# Patient Record
Sex: FEMALE | Race: WHITE | NOT HISPANIC OR LATINO | Employment: OTHER | URBAN - METROPOLITAN AREA
[De-identification: names, ages, dates, MRNs, and addresses within clinical notes are randomized per-mention and may not be internally consistent; named-entity substitution may affect disease eponyms.]

---

## 2017-01-03 DIAGNOSIS — C90.01 MULTIPLE MYELOMA IN REMISSION (H): ICD-10-CM

## 2017-01-03 LAB
BASOPHILS # BLD AUTO: 0 10E9/L (ref 0–0.2)
BASOPHILS NFR BLD AUTO: 0.7 %
CREAT SERPL-MCNC: 0.67 MG/DL (ref 0.52–1.04)
DIFFERENTIAL METHOD BLD: ABNORMAL
EOSINOPHIL # BLD AUTO: 0.1 10E9/L (ref 0–0.7)
EOSINOPHIL NFR BLD AUTO: 3.2 %
ERYTHROCYTE [DISTWIDTH] IN BLOOD BY AUTOMATED COUNT: 15.7 % (ref 10–15)
GFR SERPL CREATININE-BSD FRML MDRD: 84 ML/MIN/1.7M2
HCT VFR BLD AUTO: 37.3 % (ref 35–47)
HGB BLD-MCNC: 11.9 G/DL (ref 11.7–15.7)
LYMPHOCYTES # BLD AUTO: 1 10E9/L (ref 0.8–5.3)
LYMPHOCYTES NFR BLD AUTO: 34.2 %
MCH RBC QN AUTO: 32.4 PG (ref 26.5–33)
MCHC RBC AUTO-ENTMCNC: 31.9 G/DL (ref 31.5–36.5)
MCV RBC AUTO: 102 FL (ref 78–100)
MONOCYTES # BLD AUTO: 0.2 10E9/L (ref 0–1.3)
MONOCYTES NFR BLD AUTO: 6.3 %
NEUTROPHILS # BLD AUTO: 1.6 10E9/L (ref 1.6–8.3)
NEUTROPHILS NFR BLD AUTO: 55.6 %
PLATELET # BLD AUTO: 135 10E9/L (ref 150–450)
RBC # BLD AUTO: 3.67 10E12/L (ref 3.8–5.2)
WBC # BLD AUTO: 2.8 10E9/L (ref 4–11)

## 2017-01-03 PROCEDURE — 82565 ASSAY OF CREATININE: CPT | Performed by: INTERNAL MEDICINE

## 2017-01-03 PROCEDURE — 36415 COLL VENOUS BLD VENIPUNCTURE: CPT | Performed by: INTERNAL MEDICINE

## 2017-01-03 PROCEDURE — 85025 COMPLETE CBC W/AUTO DIFF WBC: CPT | Performed by: INTERNAL MEDICINE

## 2017-01-05 ENCOUNTER — TELEPHONE (OUTPATIENT)
Dept: FAMILY MEDICINE | Facility: CLINIC | Age: 82
End: 2017-01-05

## 2017-01-05 NOTE — TELEPHONE ENCOUNTER
Reason for Call:  Request for results:    Name of test or procedure: blood test    Date of test of procedure: 01/3/17    Location of the test or procedure: [facility]    OK to leave the result message on voice mail or with a family member? YES    Phone number Patient can be reached at:  Home number on file [phone] (home)    Additional comments: please fax these results to [facility]    [phone]    Call taken on 1/5/2017 at 1:05 PM by Joan Goodrich

## 2017-01-12 ENCOUNTER — ANTICOAGULATION THERAPY VISIT (OUTPATIENT)
Dept: NURSING | Facility: CLINIC | Age: 82
End: 2017-01-12
Payer: COMMERCIAL

## 2017-01-12 DIAGNOSIS — Z79.01 LONG-TERM (CURRENT) USE OF ANTICOAGULANTS: Primary | ICD-10-CM

## 2017-01-12 LAB — INR POINT OF CARE: 2 (ref 0.86–1.14)

## 2017-01-12 PROCEDURE — 99207 ZZC NO CHARGE NURSE ONLY: CPT

## 2017-01-12 PROCEDURE — 85610 PROTHROMBIN TIME: CPT | Mod: QW

## 2017-01-12 PROCEDURE — 36416 COLLJ CAPILLARY BLOOD SPEC: CPT

## 2017-01-12 NOTE — MR AVS SNAPSHOT
Amira Arreola   1/12/2017 9:45 AM   Anticoagulation Therapy Visit    Description:  84 year old female   Provider:   ANTICOAGULATION CLINIC   Department:  Ec Nurse           INR as of 1/12/2017     Selected INR 2.0 (1/12/2017)      Anticoagulation Summary as of 1/12/2017     INR goal 2.0-3.0   Selected INR 2.0 (1/12/2017)   Full instructions 1/18: 6 mg; 1/25: 6 mg; Otherwise 6 mg on Mon, Fri; 4 mg all other days   Next INR check 1/26/2017    Indications   Long-term (current) use of anticoagulants [Z79.01] [Z79.01]  Atrial fibrillation (H) [I48.91] (Resolved) [I48.91]         Your next Anticoagulation Clinic appointment(s)     Jan 26, 2017 10:30 AM   Anticoagulation Visit with  ANTICOAGULATION CLINIC   [facility] ([facility])    [address] [place] [id number] [phone]              Contact Numbers     Clinic Number:         January 2017 Details    Sun Mon Tue Wed Thu Fri Sat     1               2               3               4               5               6               7                 8               9               10               11               12      4 mg   See details      13      6 mg         14      4 mg           15      4 mg         16      6 mg         17      4 mg         18      6 mg         19      4 mg         20      6 mg         21      4 mg           22      4 mg         23      6 mg         24      4 mg         25      6 mg         26            27               28                 29               30               31                    Date Details   01/12 This INR check       Date of next INR:  1/26/2017         How to take your warfarin dose     To take:  4 mg Take 1 of the 4 mg tablets.    To take:  6 mg Take 1.5 of the 4 mg tablets.

## 2017-01-12 NOTE — PROGRESS NOTES
ANTICOAGULATION FOLLOW-UP CLINIC VISIT    Patient Name:  Amira Arreola  Date:  1/12/2017  Contact Type:  Face to Face    SUBJECTIVE:     Patient Findings     Positives No Problem Findings           OBJECTIVE    INR PROTIME   Date Value Ref Range Status   01/12/2017 2.0* 0.86 - 1.14 Final       ASSESSMENT / PLAN  INR assessment THER    Recheck INR In: 2 WEEKS    INR Location Clinic      Anticoagulation Summary as of 1/12/2017     INR goal 2.0-3.0   Selected INR 2.0 (1/12/2017)   Maintenance plan 6 mg (4 mg x 1.5) on Mon, Fri; 4 mg (4 mg x 1) all other days   Full instructions 1/18: 6 mg; 1/25: 6 mg; Otherwise 6 mg on Mon, Fri; 4 mg all other days   Weekly total 32 mg   Plan last modified Eufemia Boateng RN (12/2/2016)   Next INR check 1/26/2017   Target end date Indefinite    Indications   Long-term (current) use of anticoagulants [Z79.01] [Z79.01]  Atrial fibrillation (H) [I48.91] (Resolved) [I48.91]         Anticoagulation Episode Summary     INR check location     Preferred lab     Send INR reminders to [facility]    Comments PATIENT TAKES WARFARIN IN THE MORNING      Anticoagulation Care Providers     Provider Role Specialty Phone number    Addy Frias MD [facility] Internal Medicine [phone]            See the Encounter Report to view Anticoagulation Flowsheet and Dosing Calendar (Go to Encounters tab in chart review, and find the Anticoagulation Therapy Visit)    Take 6 mg MWF, 4 mg all other days, per patient request to increase from 32 to 34 mg/week, recheck two weeks.     Dosage adjustment made based on physician directed care plan.    Alissa Noble RN

## 2017-01-26 ENCOUNTER — OFFICE VISIT (OUTPATIENT)
Dept: FAMILY MEDICINE | Facility: CLINIC | Age: 82
End: 2017-01-26
Payer: COMMERCIAL

## 2017-01-26 ENCOUNTER — ANTICOAGULATION THERAPY VISIT (OUTPATIENT)
Dept: NURSING | Facility: CLINIC | Age: 82
End: 2017-01-26
Payer: COMMERCIAL

## 2017-01-26 VITALS
WEIGHT: 130 LBS | HEIGHT: 66 IN | SYSTOLIC BLOOD PRESSURE: 120 MMHG | DIASTOLIC BLOOD PRESSURE: 64 MMHG | HEART RATE: 63 BPM | BODY MASS INDEX: 20.89 KG/M2 | TEMPERATURE: 97.8 F | OXYGEN SATURATION: 96 %

## 2017-01-26 DIAGNOSIS — H61.23 BILATERAL IMPACTED CERUMEN: Primary | ICD-10-CM

## 2017-01-26 DIAGNOSIS — Z79.01 LONG-TERM (CURRENT) USE OF ANTICOAGULANTS: Primary | ICD-10-CM

## 2017-01-26 LAB — INR POINT OF CARE: 2 (ref 0.86–1.14)

## 2017-01-26 PROCEDURE — 69210 REMOVE IMPACTED EAR WAX UNI: CPT | Mod: 50 | Performed by: FAMILY MEDICINE

## 2017-01-26 PROCEDURE — 99207 ZZC NO CHARGE NURSE ONLY: CPT

## 2017-01-26 PROCEDURE — 36416 COLLJ CAPILLARY BLOOD SPEC: CPT

## 2017-01-26 PROCEDURE — 85610 PROTHROMBIN TIME: CPT | Mod: QW

## 2017-01-26 NOTE — PROGRESS NOTES
SUBJECTIVE:                                                    Amira Arreola is a 84 year old female who presents to clinic today for the following health issues:      Concern - plugged ears     Onset: x years     Description:   Needs ear flushed per pt    Intensity: Progression of Symptoms:      Accompanying Signs & Symptoms:         Previous history of similar problem:       Precipitating factors:   Worsened by:     Alleviating factors:  Improved by:        Therapies Tried and outcome:       Problem list and histories reviewed & adjusted, as indicated.  Additional history: as documented    Patient Active Problem List   Diagnosis     Advanced directives, counseling/discussion     Benign essential hypertension     Colonic polyp     CARDIOVASCULAR SCREENING; LDL GOAL LESS THAN 160     Hyperlipidemia LDL goal <160     Sciatica of left side     Osteoporosis     OAB (overactive bladder)     Elevated MCV     Thrombocytopenia (H)     MGUS (monoclonal gammopathy of unknown significance)     Ascending aorta dilatation (H)     SVT (supraventricular tachycardia) (H)     Paroxysmal atrial fibrillation (H)     Long-term (current) use of anticoagulants [Z79.01]     Cancer, metastatic to bone (H)     Multiple myeloma not having achieved remission (H)     Past Surgical History   Procedure Laterality Date     Hysteroscopy and d and c       due to bleeding     Right ankle surgery       Cataract iol, rt/lt       Left anle replacement       Colonoscopy  2013     Procedure: COLONOSCOPY;  COLONOSCOPY;  Surgeon: Steffany Rockwell MD;  Location:  GI      section  ,      Excise exostosis tibia / fibula  2014     Procedure: EXCISE EXOSTOSIS TIBIA / FIBULA;  Surgeon: Naila Pichardo MD;  Location: [facility]     Bone marrow biopsy, bone specimen, needle/trocar N/A 2016     Procedure: BIOPSY BONE MARROW;  Surgeon: Bryan Patel MD;  Location:  GI       Social History    Substance Use Topics     Smoking status: Never Smoker      Smokeless tobacco: Never Used     Alcohol Use: No     Family History   Problem Relation Age of Onset     HEART DISEASE Father      Family History Negative Sister      Family History Negative Sister      Family History Negative Brother      C.A.D. Mother          Current Outpatient Prescriptions   Medication Sig Dispense Refill     warfarin (COUMADIN) 4 MG tablet Take 1 1/2 tabs (6 mg) on Mon and Friday, 1 tab (4 mg) rest of week or as directed by the ACC. 84 tablet 0     Zoledronic Acid (ZOMETA IV) Inject into the vein every 30 days       metoprolol (TOPROL-XL) 50 MG 24 hr tablet Take 1 tablet (50 mg) by mouth 2 times daily 180 tablet 3     LENalidomide (REVLIMID) 15 MG CAPS capsule CHEMOTHERAPY Take 1 capsule (15 mg) by mouth daily 30 capsule 0     Desloratadine (CLARINEX PO)        DEXAMETHASONE PO Take 4 mg by mouth 5 tabs daily on Thursday       OXYCODONE HCL PO Take by mouth as needed       Prochlorperazine Maleate (COMPAZINE PO) Take 10 mg by mouth       acetaminophen-codeine (TYLENOL W/CODEINE NO. 3) 300-30 MG per tablet Take 1 tablet by mouth every 8 hours as needed for mild pain Take 1/2 tablet with advil up to every 8 hours as necessary 60 tablet 0     LORazepam (ATIVAN) 0.5 MG tablet 1 hour prior to MRI take 1 tablet (0.5 mg) and may repeat x's 1. 10 tablet 0     ASPIRIN NOT PRESCRIBED (INTENTIONAL) Antiplatelet medication not prescribed intentionally due to Current anticoagulant therapy (warfarin/enoxaparin) 0 each 0     polyethylene glycol (MIRALAX/GLYCOLAX) powder Take 1 capful by mouth daily       UNABLE TO FIND MEDICATION NAME: Fresh Coat eye drops       timolol (TIMOPTIC) 0.25 % ophthalmic solution 1 drop every morning       carboxymethylcellulose (REFRESH PLUS) 0.5 % SOLN 1 drop 4 times daily       Cholecalciferol (VITAMIN D3 PO) Take 1,000 Units by mouth daily       Calcium Citrate-Vitamin D (CALCIUM CITRATE + PO) Take 2,000 mg by mouth  2 tabs       erythromycin (ROMYCIN) ophthalmic ointment Place 1 Application into both eyes At Bedtime        cycloSPORINE (RESTASIS) 0.05 % ophthalmic emulsion Place 1 drop into both eyes every 12 hours        Docusate Sodium (GENTLE STOOL SOFTENER PO) Take 100 mg by mouth daily        Multiple Vitamin (DAILY MULTIVITAMIN PO) Take 1 tablet by mouth daily.       Allergies   Allergen Reactions     Penicillin [Penicillins] Rash     Blotches on chest      Recent Labs   Lab Test  01/03/17   0959  11/09/16   0920  10/12/16   0839   05/11/16   0859  04/19/16   1431  10/22/15   1640  09/21/15   0911  09/16/14   0910   LDL   --    --   71   --    --    --    --   99  96   HDL   --    --   76   --    --    --    --   86  74   TRIG   --    --   66   --    --    --    --   66  62   ALT   --    --   27   --    --    --    --   20  18   CR  0.67  0.67  0.65   < >  0.59  0.59   --   0.57  0.63   GFRESTIMATED  84  84  86   < >  >90  Non  GFR Calc    >90  Non  GFR Calc     --   >90  Non  GFR Calc    >90  Non  GFR Calc     GFRESTBLACK  >90   GFR Calc    >90   GFR Calc    >90   GFR Calc     < >  >90   GFR Calc    >90   GFR Calc     --   >90   GFR Calc    >90   GFR Calc     POTASSIUM   --    --   4.4   --   4.3  4.2   --   4.0  4.1   TSH   --    --    --    --    --   1.68  1.84   --    --     < > = values in this interval not displayed.      BP Readings from Last 3 Encounters:   01/26/17 120/64   12/22/16 136/76   09/29/16 133/72    Wt Readings from Last 3 Encounters:   01/26/17 130 lb (58.968 kg)   12/22/16 128 lb (58.06 kg)   09/29/16 131 lb (59.421 kg)           Amira Arreola is a 84 year old female who presents with bilateral ear fullness for 2 week(s).   Severity: moderate   Timing:gradual onset  Additional symptoms include none.      History of  recurrent otitis: not applicable    Past Medical History   Diagnosis Date     Osteoporosis      fu done 2010 and stable, went off meds then, fu done 2013; has had gyn fu and added evista 2013 by gyn     HTN (hypertension) 2000     off meds for years     Menorrhagia 2002     hysteroscopy and d and c done     Shingles 2004     Colonic polyp 2008     adenomatous, fu 2013 tics only     Sciatica of left side 12/13     Dr. Cervantes     OAB (overactive bladder) 2013     Dr. Grullon     Elevated MCV 2015     b12 and folic acid nl     Thrombocytopenia (H) 2014     MGUS (monoclonal gammopathy of unknown significance) 2015     eval by Dr. Roberts     Palpitations 4/16     nl echo, mildly dilated asc aorta     Ascending aorta dilatation (H) 4/16     on echo, mild     Dry eyes      Paroxysmal atrial fibrillation (H) 4/16     had palp and ziopatch showed it, echo nl lv fxn, mild mr and tr, added coum and toprol, toprol dose raised 12/22/16     SVT (supraventricular tachycardia) (H) 4/16     on ziopatch     Multiple myeloma (H) 2016     dx 5/16 at [place], bone lesions seen on mri 6/16     Cancer, metastatic to bone (H)      due to myeloma     Compression fracture 2016     multiple areas of spine     Current Outpatient Prescriptions   Medication Sig Dispense Refill     warfarin (COUMADIN) 4 MG tablet Take 1 1/2 tabs (6 mg) on Mon and Friday, 1 tab (4 mg) rest of week or as directed by the ACC. 84 tablet 0     Zoledronic Acid (ZOMETA IV) Inject into the vein every 30 days       metoprolol (TOPROL-XL) 50 MG 24 hr tablet Take 1 tablet (50 mg) by mouth 2 times daily 180 tablet 3     LENalidomide (REVLIMID) 15 MG CAPS capsule CHEMOTHERAPY Take 1 capsule (15 mg) by mouth daily 30 capsule 0     Desloratadine (CLARINEX PO)        DEXAMETHASONE PO Take 4 mg by mouth 5 tabs daily on Thursday       OXYCODONE HCL PO Take by mouth as needed       Prochlorperazine Maleate (COMPAZINE PO) Take 10 mg by mouth       acetaminophen-codeine (TYLENOL  "W/CODEINE NO. 3) 300-30 MG per tablet Take 1 tablet by mouth every 8 hours as needed for mild pain Take 1/2 tablet with advil up to every 8 hours as necessary 60 tablet 0     LORazepam (ATIVAN) 0.5 MG tablet 1 hour prior to MRI take 1 tablet (0.5 mg) and may repeat x's 1. 10 tablet 0     ASPIRIN NOT PRESCRIBED (INTENTIONAL) Antiplatelet medication not prescribed intentionally due to Current anticoagulant therapy (warfarin/enoxaparin) 0 each 0     polyethylene glycol (MIRALAX/GLYCOLAX) powder Take 1 capful by mouth daily       UNABLE TO FIND MEDICATION NAME: Fresh Coat eye drops       timolol (TIMOPTIC) 0.25 % ophthalmic solution 1 drop every morning       carboxymethylcellulose (REFRESH PLUS) 0.5 % SOLN 1 drop 4 times daily       Cholecalciferol (VITAMIN D3 PO) Take 1,000 Units by mouth daily       Calcium Citrate-Vitamin D (CALCIUM CITRATE + PO) Take 2,000 mg by mouth 2 tabs       erythromycin (ROMYCIN) ophthalmic ointment Place 1 Application into both eyes At Bedtime        cycloSPORINE (RESTASIS) 0.05 % ophthalmic emulsion Place 1 drop into both eyes every 12 hours        Docusate Sodium (GENTLE STOOL SOFTENER PO) Take 100 mg by mouth daily        Multiple Vitamin (DAILY MULTIVITAMIN PO) Take 1 tablet by mouth daily.       Social History   Substance Use Topics     Smoking status: Never Smoker      Smokeless tobacco: Never Used     Alcohol Use: No       ROS:   Review of systems negative except as stated above.    OBJECTIVE:  /64 mmHg  Pulse 63  Temp(Src) 97.8  F (36.6  C) (Tympanic)  Ht 5' 6\" (1.676 m)  Wt 130 lb (58.968 kg)  BMI 20.99 kg/m2  SpO2 96%   EXAM:  The right TM is not visualized secondary to cerumen     The right auditory canal is obstructed with cerumen  The left TM is not visualized secondary to cerumen  The left auditory canal is obstructed with cerumen  Oropharynx exam is normal: no lesions, erythema, adenopathy or exudate.  GENERAL: no acute distress  EYES: EOMI,  PERRL, conjunctiva " clear  NECK: supple, non-tender to palpation, no adenopathy noted  RESP: lungs clear to auscultation - no rales, rhonchi or wheezes  CV: regular rates and rhythm, normal S1 S2, no murmur noted  SKIN: no suspicious lesions or rashes     ASSESSMENT:  Amira was seen today for ear problem.    Diagnoses and all orders for this visit:    Bilateral impacted cerumen  -     REMOVAL OF IMPACTED WAX MD      Wax was removed from both ear external canal with ENT foceps and flushed with water without complication

## 2017-01-26 NOTE — MR AVS SNAPSHOT
Amiragino Arreola   1/26/2017 10:30 AM   Anticoagulation Therapy Visit    Description:  84 year old female   Provider:  [facility] ANTICOAGULATION [facility]IC   Department:  Ec Nurse           INR as of 1/26/2017     Selected INR 2.0 (1/26/2017)      Anticoagulation Summary as of 1/26/2017     INR goal 2.0-3.0   Selected INR 2.0 (1/26/2017)   Full instructions 1/29: 6 mg; 2/1: 6 mg; 2/5: 6 mg; 2/8: 6 mg; Otherwise 6 mg on Mon, Fri; 4 mg all other days   Next INR check 2/9/2017    Indications   Long-term (current) use of anticoagulants [Z79.01] [Z79.01]  Atrial fibrillation (H) [I48.91] (Resolved) [I48.91]         Your next Anticoagulation Clinic appointment(s)     Feb 09, 2017 10:15 AM   Anticoagulation Visit with  ANTICOAGULATION CLINIC   [facility] ([address] [id number] [phone]              Contact Numbers     Clinic Number:         January 2017 Details    Sun Mon Tue Wed Thu Fri Sat     1               2               3               4               5               6               7                 8               9               10               11               12               13               14                 15               16               17               18               19               20               21                 22               23               24               25               26      4 mg   See details      27      6 mg         28      4 mg           29      6 mg         30      6 mg         31      4 mg              Date Details   01/26 This INR check               How to take your warfarin dose     To take:  4 mg Take 1 of the 4 mg tablets.    To take:  6 mg Take 1.5 of the 4 mg tablets.           February 2017 Details    Sun Mon Tue Wed Thu Fri Sat        1      6 mg         2      4 mg         3      6 mg         4      4 mg           5      6 mg         6      6 mg         7      4 mg         8       6 mg         9            10               11                 12               13               14               15               16               17               18                 19               20               21               22               23               24               25                 26               27               28                    Date Details   No additional details    Date of next INR:  2/9/2017         How to take your warfarin dose     To take:  4 mg Take 1 of the 4 mg tablets.    To take:  6 mg Take 1.5 of the 4 mg tablets.

## 2017-01-26 NOTE — NURSING NOTE
"Chief Complaint   Patient presents with     Ear Problem       Initial /64 mmHg  Pulse 63  Temp(Src) 97.8  F (36.6  C) (Tympanic)  Ht 5' 6\" (1.676 m)  Wt 130 lb (58.968 kg)  BMI 20.99 kg/m2  SpO2 96% Estimated body mass index is 20.99 kg/(m^2) as calculated from the following:    Height as of this encounter: 5' 6\" (1.676 m).    Weight as of this encounter: 130 lb (58.968 kg).  BP completed using cuff size: liliana Jain CMA    "

## 2017-01-26 NOTE — MR AVS SNAPSHOT
After Visit Summary   1/26/2017    Amira Arreola    MRN: [id number]           Patient Information     Date Of Birth          7/17/1932        Visit Information        Provider Department      1/26/2017 11:00 AM David Duarte MD [facility] [place]        Today's Diagnoses     Bilateral impacted cerumen    -  1        Follow-ups after your visit        Your next 10 appointments already scheduled     Feb 08, 2017 10:15 AM   LAB with CS LAB   [facility] ([facility])    [address] [facility] [address] [phone]           Patient must bring picture ID.  Patient should be prepared to give a urine specimen  Please do not eat 10-12 hours before your appointment if you are coming in fasting for labs on lipids, cholesterol, or glucose (sugar).  Pregnant women should follow their Care Team instructions. Water with medications is okay. Do not drink coffee or other fluids.   If you have concerns about taking  your medications, please ask at office or if scheduling via [employer], send a message by clicking on Secure Messaging, Message Your Care Team.            Feb 09, 2017 10:15 AM   Anticoagulation Visit with [facility] ANTICOAGULATION CLINIC   [facility] [place] ([facility] [place])    [address] [place] [id number] [phone]              Who to contact     If you have questions or need follow up information about today's clinic visit or your schedule please contact [facility] [place] directly at [phone].  Normal or non-critical lab and imaging results will be communicated to you by MyChart, letter or phone within 4 business days after the clinic has received the results. If you do not hear from us within 7 days, please contact the clinic through [employer] or phone. If you have a critical or abnormal lab result, we will notify you by phone as soon as possible.  Submit refill requests through [employer]  "or call your pharmacy and they will forward the refill request to us. Please allow 3 business days for your refill to be completed.          Additional Information About Your Visit        Care EveryWhere ID     This is your Care EveryWhere ID. This could be used by other organizations to access your [facility] medical records  [id number]        Your Vitals Were     Pulse Temperature Height BMI (Body Mass Index) Pulse Oximetry       63 97.8  F (36.6  C) (Tympanic) 5' 6\" (1.676 m) 20.99 kg/m2 96%        Blood Pressure from Last 3 Encounters:   01/26/17 120/64   12/22/16 136/76   09/29/16 133/72    Weight from Last 3 Encounters:   01/26/17 130 lb (58.968 kg)   12/22/16 128 lb (58.06 kg)   09/29/16 131 lb (59.421 kg)              We Performed the Following     REMOVAL OF IMPACTED WAX MD        Primary Care Provider Office Phone # Fax #    Addy Sean Frias [phone]       [facility] [address] [facility] [address]  [facility] [address]        Thank you!     Thank you for choosing [facility]  for your care. Our goal is always to provide you with excellent care. Hearing back from our patients is one way we can continue to improve our services. Please take a few minutes to complete the written survey that you may receive in the mail after your visit with us. Thank you!             Your Updated Medication List - Protect others around you: Learn how to safely use, store and throw away your medicines at www.disposemymeds.org.          This list is accurate as of: 1/26/17 11:07 AM.  Always use your most recent med list.                   Brand Name Dispense Instructions for use    acetaminophen-codeine 300-30 MG per tablet    TYLENOL w/CODEINE No. 3    60 tablet    Take 1 tablet by mouth every 8 hours as needed for mild pain Take 1/2 tablet with advil up to every 8 hours as necessary       ASPIRIN NOT PRESCRIBED    INTENTIONAL    0 each    Antiplatelet medication not prescribed " intentionally due to Current anticoagulant therapy (warfarin/enoxaparin)       ATIVAN 0.5 MG tablet   Generic drug:  LORazepam     10 tablet    1 hour prior to MRI take 1 tablet (0.5 mg) and may repeat x's 1.       CALCIUM CITRATE + PO      Take 2,000 mg by mouth 2 tabs       carboxymethylcellulose 0.5 % Soln ophthalmic solution    REFRESH PLUS     1 drop 4 times daily       CLARINEX PO          COMPAZINE PO      Take 10 mg by mouth       cycloSPORINE 0.05 % ophthalmic emulsion    RESTASIS     Place 1 drop into both eyes every 12 hours       DAILY MULTIVITAMIN PO      Take 1 tablet by mouth daily.       DEXAMETHASONE PO      Take 4 mg by mouth 5 tabs daily on Thursday       erythromycin ophthalmic ointment    ROMYCIN     Place 1 Application into both eyes At Bedtime       GENTLE STOOL SOFTENER PO      Take 100 mg by mouth daily       LENalidomide 15 MG Caps capsule CHEMOTHERAPY    REVLIMID    30 capsule    Take 1 capsule (15 mg) by mouth daily       metoprolol 50 MG 24 hr tablet    TOPROL-XL    180 tablet    Take 1 tablet (50 mg) by mouth 2 times daily       OXYCODONE HCL PO      Take by mouth as needed       polyethylene glycol powder    MIRALAX/GLYCOLAX     Take 1 capful by mouth daily       timolol 0.25 % ophthalmic solution    TIMOPTIC     1 drop every morning       UNABLE TO FIND      MEDICATION NAME: Fresh Coat eye drops       VITAMIN D3 PO      Take 1,000 Units by mouth daily       warfarin 4 MG tablet    COUMADIN    84 tablet    Take 1 1/2 tabs (6 mg) on Mon and Friday, 1 tab (4 mg) rest of week or as directed by the ACC.       ZOMETA IV      Inject into the vein every 30 days

## 2017-01-26 NOTE — PROGRESS NOTES
ANTICOAGULATION FOLLOW-UP CLINIC VISIT    Patient Name:  Amira Arreola  Date:  1/26/2017  Contact Type:  Face to Face    SUBJECTIVE:     Patient Findings     Positives No Problem Findings           OBJECTIVE    INR PROTIME   Date Value Ref Range Status   01/26/2017 2.0* 0.86 - 1.14 Final       ASSESSMENT / PLAN  INR assessment THER    Recheck INR In: 2 WEEKS    INR Location Clinic      Anticoagulation Summary as of 1/26/2017     INR goal 2.0-3.0   Selected INR 2.0 (1/26/2017)   Maintenance plan 6 mg (4 mg x 1.5) on Mon, Fri; 4 mg (4 mg x 1) all other days   Full instructions 1/29: 6 mg; 2/1: 6 mg; 2/5: 6 mg; 2/8: 6 mg; Otherwise 6 mg on Mon, Fri; 4 mg all other days   Weekly total 32 mg   Plan last modified Eufemia Boateng RN (12/2/2016)   Next INR check 2/9/2017   Target end date Indefinite    Indications   Long-term (current) use of anticoagulants [Z79.01] [Z79.01]  Atrial fibrillation (H) [I48.91] (Resolved) [I48.91]         Anticoagulation Episode Summary     INR check location     Preferred lab     Send INR reminders to [facility]    Comments PATIENT TAKES WARFARIN IN THE MORNING      Anticoagulation Care Providers     Provider Role Specialty Phone number    Addy Frias MD [facility] Internal Medicine [phone]            See the Encounter Report to view Anticoagulation Flowsheet and Dosing Calendar (Go to Encounters tab in chart review, and find the Anticoagulation Therapy Visit)    Take 6 mg SMWF, 4 mg all other days, patient request to increase dose by 2 mg to increase INR reading.  Recheck 2 weeks.     Dosage adjustment made based on physician directed care plan.    Alissa Noble, SHELLIE

## 2017-02-08 DIAGNOSIS — C90.01 MULTIPLE MYELOMA IN REMISSION (H): Primary | ICD-10-CM

## 2017-02-08 LAB
BASOPHILS # BLD AUTO: 0 10E9/L (ref 0–0.2)
BASOPHILS NFR BLD AUTO: 1.3 %
DIFFERENTIAL METHOD BLD: ABNORMAL
EOSINOPHIL # BLD AUTO: 0.1 10E9/L (ref 0–0.7)
EOSINOPHIL NFR BLD AUTO: 6.3 %
ERYTHROCYTE [DISTWIDTH] IN BLOOD BY AUTOMATED COUNT: 15.2 % (ref 10–15)
HCT VFR BLD AUTO: 35.8 % (ref 35–47)
HGB BLD-MCNC: 11.5 G/DL (ref 11.7–15.7)
LYMPHOCYTES # BLD AUTO: 0.6 10E9/L (ref 0.8–5.3)
LYMPHOCYTES NFR BLD AUTO: 38.1 %
MCH RBC QN AUTO: 33 PG (ref 26.5–33)
MCHC RBC AUTO-ENTMCNC: 32.1 G/DL (ref 31.5–36.5)
MCV RBC AUTO: 103 FL (ref 78–100)
MONOCYTES # BLD AUTO: 0.1 10E9/L (ref 0–1.3)
MONOCYTES NFR BLD AUTO: 8.1 %
NEUTROPHILS # BLD AUTO: 0.7 10E9/L (ref 1.6–8.3)
NEUTROPHILS NFR BLD AUTO: 46.2 %
PLATELET # BLD AUTO: 147 10E9/L (ref 150–450)
RBC # BLD AUTO: 3.48 10E12/L (ref 3.8–5.2)
WBC # BLD AUTO: 1.6 10E9/L (ref 4–11)

## 2017-02-08 PROCEDURE — 82565 ASSAY OF CREATININE: CPT | Performed by: INTERNAL MEDICINE

## 2017-02-08 PROCEDURE — 36415 COLL VENOUS BLD VENIPUNCTURE: CPT | Performed by: INTERNAL MEDICINE

## 2017-02-08 PROCEDURE — 85025 COMPLETE CBC W/AUTO DIFF WBC: CPT | Performed by: INTERNAL MEDICINE

## 2017-02-09 ENCOUNTER — ANTICOAGULATION THERAPY VISIT (OUTPATIENT)
Dept: NURSING | Facility: CLINIC | Age: 82
End: 2017-02-09
Payer: COMMERCIAL

## 2017-02-09 DIAGNOSIS — Z79.01 LONG-TERM (CURRENT) USE OF ANTICOAGULANTS: Primary | ICD-10-CM

## 2017-02-09 LAB
CREAT SERPL-MCNC: 0.7 MG/DL (ref 0.52–1.04)
GFR SERPL CREATININE-BSD FRML MDRD: 79 ML/MIN/1.7M2
INR POINT OF CARE: 2 (ref 0.86–1.14)

## 2017-02-09 PROCEDURE — 36416 COLLJ CAPILLARY BLOOD SPEC: CPT

## 2017-02-09 PROCEDURE — 85610 PROTHROMBIN TIME: CPT | Mod: QW

## 2017-02-09 NOTE — MR AVS SNAPSHOT
Amira Arreola   2/9/2017 10:15 AM   Anticoagulation Therapy Visit    Description:  84 year old female   Provider:  [facility]   Department:  Ec Nurse           INR as of 2/9/2017     Selected INR 2.0 (2/9/2017)      Anticoagulation Summary as of 2/9/2017     INR goal 2.0-3.0   Selected INR 2.0 (2/9/2017)   Full instructions 2/9: 6 mg; 2/12: 6 mg; Otherwise 6 mg on Mon, Fri; 4 mg all other days   Next INR check 2/16/2017    Indications   Long-term (current) use of anticoagulants [Z79.01] [Z79.01]  Atrial fibrillation (H) [I48.91] (Resolved) [I48.91]         Your next Anticoagulation Clinic appointment(s)     Feb 16, 2017  9:30 AM   Anticoagulation Visit with  ANTICOAGULATION CLINIC   [facility] [place]    [address] [place] [id number] [phone]              Contact Numbers     Clinic Number:         February 2017 Details    Sun Mon Tue Wed Thu Fri Sat        1               2               3               4                 5               6               7               8               9      6 mg   See details      10      6 mg         11      4 mg           12      6 mg         13      6 mg         14      4 mg         15      4 mg         16            17               18                 19               20               21               22               23               24               25                 26               27               28                    Date Details   02/09 This INR check       Date of next INR:  2/16/2017         How to take your warfarin dose     To take:  4 mg Take 1 of the 4 mg tablets.    To take:  6 mg Take 1.5 of the 4 mg tablets.

## 2017-02-09 NOTE — PROGRESS NOTES
ANTICOAGULATION FOLLOW-UP CLINIC VISIT    Patient Name:  Amira Arreola  Date:  2/9/2017  Contact Type:  Face to Face    SUBJECTIVE:     Patient Findings     Positives No Problem Findings           OBJECTIVE    INR PROTIME   Date Value Ref Range Status   02/09/2017 2.0* 0.86 - 1.14 Final       ASSESSMENT / PLAN  INR assessment THER    Recheck INR In: 1 WEEK    INR Location Clinic      Anticoagulation Summary as of 2/9/2017     INR goal 2.0-3.0   Selected INR 2.0 (2/9/2017)   Maintenance plan 6 mg (4 mg x 1.5) on Mon, Fri; 4 mg (4 mg x 1) all other days   Full instructions 2/9: 6 mg; 2/12: 6 mg; 2/15: 6 mg; Otherwise 6 mg on Mon, Fri; 4 mg all other days   Weekly total 32 mg   Plan last modified Eufemia Boateng RN (12/2/2016)   Next INR check 2/16/2017   Target end date Indefinite    Indications   Long-term (current) use of anticoagulants [Z79.01] [Z79.01]  Atrial fibrillation (H) [I48.91] (Resolved) [I48.91]         Anticoagulation Episode Summary     INR check location     Preferred lab     Send INR reminders to  [facility]    Comments PATIENT TAKES WARFARIN IN THE MORNING      Anticoagulation Care Providers     Provider Role Specialty Phone number    Addy Frias MD [facility] Internal Medicine [phone]            See the Encounter Report to view Anticoagulation Flowsheet and Dosing Calendar (Go to Encounters tab in chart review, and find the Anticoagulation Therapy Visit)    Take 4 mg TS, 6 mg all other days. Recheck one week, patient request.    Dosage adjustment made based on physician directed care plan.    Alissa Noble RN

## 2017-02-16 ENCOUNTER — ANTICOAGULATION THERAPY VISIT (OUTPATIENT)
Dept: NURSING | Facility: CLINIC | Age: 82
End: 2017-02-16
Payer: COMMERCIAL

## 2017-02-16 DIAGNOSIS — Z79.01 LONG-TERM (CURRENT) USE OF ANTICOAGULANTS: Primary | ICD-10-CM

## 2017-02-16 DIAGNOSIS — I48.0 PAROXYSMAL ATRIAL FIBRILLATION (H): ICD-10-CM

## 2017-02-16 DIAGNOSIS — Z79.01 LONG-TERM (CURRENT) USE OF ANTICOAGULANTS: ICD-10-CM

## 2017-02-16 LAB — INR POINT OF CARE: 2.1 (ref 0.86–1.14)

## 2017-02-16 PROCEDURE — 85610 PROTHROMBIN TIME: CPT | Mod: QW

## 2017-02-16 PROCEDURE — 36416 COLLJ CAPILLARY BLOOD SPEC: CPT

## 2017-02-16 RX ORDER — WARFARIN SODIUM 4 MG/1
TABLET ORAL
Qty: 84 TABLET | Refills: 0 | Status: SHIPPED | OUTPATIENT
Start: 2017-02-16 | End: 2017-02-24

## 2017-02-16 NOTE — TELEPHONE ENCOUNTER
Warfarin    Last Written Prescription Date: 12/29/16  Last Fill Qty: 84, # refills: 0  [place] [facility] [place] [facility] or [facility] prescribing provider: 1/26/17       Date and Result of Last PT/INR:   Lab Results   Component Value Date    INR 2.1 02/16/2017    INR 2.0 02/09/2017      Prescription approved per [facility] Ref[place] Protocol.    SHELLIE Silva

## 2017-02-16 NOTE — PROGRESS NOTES
ANTICOAGULATION FOLLOW-UP CLINIC VISIT    Patient Name:  Amira Arreola  Date:  2/16/2017  Contact Type:  Face to Face    SUBJECTIVE:     Patient Findings     Positives No Problem Findings           OBJECTIVE    INR Protime   Date Value Ref Range Status   02/16/2017 2.1 (A) 0.86 - 1.14 Final       ASSESSMENT / PLAN  INR assessment THER    Recheck INR In: 1 WEEK    INR Location Clinic      Anticoagulation Summary as of 2/16/2017     INR goal 2.0-3.0   Today's INR 2.1   Maintenance plan 6 mg (4 mg x 1.5) on Mon, Fri; 4 mg (4 mg x 1) all other days   Full instructions 2/16: 6 mg; 2/19: 6 mg; 2/21: 6 mg; 2/22: 6 mg; Otherwise 6 mg on Mon, Fri; 4 mg all other days   Weekly total 32 mg   Plan last modified Eufemia Boateng RN (12/2/2016)   Next INR check 2/23/2017   Target end date Indefinite    Indications   Long-term (current) use of anticoagulants [Z79.01] [Z79.01]  Atrial fibrillation (H) [I48.91] (Resolved) [I48.91]         Anticoagulation Episode Summary     INR check location     Preferred lab     Send INR reminders to  [facility]    Comments PATIENT TAKES WARFARIN IN THE MORNING      Anticoagulation Care Providers     Provider Role Specialty Phone number    YulianafarhadAddy MD [facility] Internal Medicine [phone]            See the Encounter Report to view Anticoagulation Flowsheet and Dosing Calendar (Go to Encounters tab in chart review, and find the Anticoagulation Therapy Visit)    Take 4 mg Sat, 6 mg all other days, recheck one week.    Pt taking [employer]aro brand, does not want to switch Pharmacy/brands at this time, goal is to get INR in mid 2's per patient request.     Dosage adjustment made based on physician directed care plan.    Alissa Noble, RN

## 2017-02-16 NOTE — MR AVS SNAPSHOT
Amira Arreola   2/16/2017 9:30 AM   Anticoagulation Therapy Visit    Description:  84 year old female   Provider:  [facility]   Department:  Ec Nurse           INR as of 2/16/2017     Today's INR 2.1      Anticoagulation Summary as of 2/16/2017     INR goal 2.0-3.0   Today's INR 2.1   Full instructions 2/16: 6 mg; 2/19: 6 mg; 2/21: 6 mg; 2/22: 6 mg; Otherwise 6 mg on Mon, Fri; 4 mg all other days   Next INR check 2/23/2017    Indications   Long-term (current) use of anticoagulants [Z79.01] [Z79.01]  Atrial fibrillation (H) [I48.91] (Resolved) [I48.91]         Your next Anticoagulation Clinic appointment(s)     Feb 23, 2017  9:45 AM CST   Anticoagulation Visit with [facility]   [facility] ([facility])    [address] [place] [id number] [phone]              Contact Numbers     Clinic Number:         February 2017 Details    Sun Mon Tue Wed Thu Fri Sat        1               2               3               4                 5               6               7               8               9               10               11                 12               13               14               15               16      6 mg   See details      17      6 mg         18      4 mg           19      6 mg         20      6 mg         21      6 mg         22      6 mg         23            24               25                 26               27               28                    Date Details   02/16 This INR check       Date of next INR:  2/23/2017         How to take your warfarin dose     To take:  4 mg Take 1 of the 4 mg tablets.    To take:  6 mg Take 1.5 of the 4 mg tablets.

## 2017-02-24 ENCOUNTER — ANTICOAGULATION THERAPY VISIT (OUTPATIENT)
Dept: NURSING | Facility: CLINIC | Age: 82
End: 2017-02-24
Payer: COMMERCIAL

## 2017-02-24 DIAGNOSIS — Z79.01 LONG-TERM (CURRENT) USE OF ANTICOAGULANTS: ICD-10-CM

## 2017-02-24 DIAGNOSIS — I48.0 PAROXYSMAL ATRIAL FIBRILLATION (H): ICD-10-CM

## 2017-02-24 LAB — INR POINT OF CARE: 3.3 (ref 0.86–1.14)

## 2017-02-24 PROCEDURE — 85610 PROTHROMBIN TIME: CPT | Mod: QW

## 2017-02-24 PROCEDURE — 99207 ZZC NO CHARGE NURSE ONLY: CPT

## 2017-02-24 PROCEDURE — 36416 COLLJ CAPILLARY BLOOD SPEC: CPT

## 2017-02-24 RX ORDER — WARFARIN SODIUM 4 MG/1
TABLET ORAL
Qty: 90 TABLET | Refills: 0 | Status: SHIPPED | OUTPATIENT
Start: 2017-02-24 | End: 2017-05-04

## 2017-02-24 NOTE — PROGRESS NOTES
ANTICOAGULATION FOLLOW-UP CLINIC VISIT    Patient Name:  Amira Arreola  Date:  2/24/2017  Contact Type:  Face to Face    SUBJECTIVE:     Patient Findings     Positives No Problem Findings           OBJECTIVE    INR Protime   Date Value Ref Range Status   02/24/2017 3.3 (A) 0.86 - 1.14 Final       ASSESSMENT / PLAN  INR assessment SUPRA    Recheck INR In: 1 WEEK    INR Location Clinic      Anticoagulation Summary as of 2/24/2017     INR goal 2.0-3.0   Today's INR 3.3!   Maintenance plan 4 mg (4 mg x 1) on Tue, Sat; 6 mg (4 mg x 1.5) all other days   Full instructions 4 mg on Tue, Sat; 6 mg all other days   Weekly total 38 mg   Plan last modified Dayana Barrera RN (2/24/2017)   Next INR check 3/3/2017   Target end date Indefinite    Indications   Long-term (current) use of anticoagulants [Z79.01] [Z79.01]  Atrial fibrillation (H) [I48.91] (Resolved) [I48.91]         Anticoagulation Episode Summary     INR check location     Preferred lab     Send INR reminders to  [facility]    Comments PATIENT TAKES WARFARIN IN THE MORNING      Anticoagulation Care Providers     Provider Role Specialty Phone number    Addy Frias MD Responsible Internal Medicine [phone]            See the Encounter Report to view Anticoagulation Flowsheet and Dosing Calendar (Go to Encounters tab in chart review, and find the Anticoagulation Therapy Visit)    Dosage adjustment made based on physician directed care plan.    3.3 today.  Advised to take 4 mg on Tues, Sat;  6 mg all other days.  Recheck in 1 week.     Dayana Barrera RN

## 2017-02-24 NOTE — MR AVS SNAPSHOT
Amiragino Arreola   2/24/2017 11:15 AM   Anticoagulation Therapy Visit    Description:  84 year old female   Provider:   ANTICOAGULATION CLINIC   Department:  Ec Nurse           INR as of 2/24/2017     Today's INR 3.3!      Anticoagulation Summary as of 2/24/2017     INR goal 2.0-3.0   Today's INR 3.3!   Full instructions 4 mg on Tue, Sat; 6 mg all other days   Next INR check 3/3/2017    Indications   Long-term (current) use of anticoagulants [Z79.01] [Z79.01]  Atrial fibrillation (H) [I48.91] (Resolved) [I48.91]         Description     3.3 today.  Advised to take 4 mg on Tues, Sat;  6 mg all other days.  Recheck in 2 week.         Your next Anticoagulation Clinic appointment(s)     Mar 03, 2017  9:45 AM CST   Anticoagulation Visit with  ANTICOAGULATION CLINIC   [facility] [place] [facility] [place] [place]    [address] [place] [id number]   [phone]              Contact Numbers     Clinic Number:         February 2017 Details    Sun Mon Tue Wed Thu Fri Sat        1               2               3               4                 5               6               7               8               9               10               11                 12               13               14               15               16               17               18                 19               20               21               22               23               24      6 mg   See details      25      4 mg           26      6 mg         27      6 mg         28      4 mg              Date Details   02/24 This INR check               How to take your warfarin dose     To take:  4 mg Take 1 of the 4 mg tablets.    To take:  6 mg Take 1.5 of the 4 mg tablets.           March 2017 Details    Sun Mon Tue Wed Thu Fri Sat        1      6 mg         2      6 mg         3            4                 5               6               7               8               9               10                11                 12               13               14               15               16               17               18                 19               20               21               22               23               24               25                 26               27               28               29               30               31                 Date Details   No additional details    Date of next INR:  3/3/2017         How to take your warfarin dose     To take:  6 mg Take 1.5 of the 4 mg tablets.

## 2017-03-01 DIAGNOSIS — C90.01 MULTIPLE MYELOMA IN REMISSION (H): ICD-10-CM

## 2017-03-01 LAB
BASOPHILS # BLD AUTO: 0 10E9/L (ref 0–0.2)
BASOPHILS NFR BLD AUTO: 0.5 %
CREAT SERPL-MCNC: 0.73 MG/DL (ref 0.52–1.04)
DIFFERENTIAL METHOD BLD: ABNORMAL
EOSINOPHIL # BLD AUTO: 0.1 10E9/L (ref 0–0.7)
EOSINOPHIL NFR BLD AUTO: 5.6 %
ERYTHROCYTE [DISTWIDTH] IN BLOOD BY AUTOMATED COUNT: 15.2 % (ref 10–15)
GFR SERPL CREATININE-BSD FRML MDRD: 76 ML/MIN/1.7M2
HCT VFR BLD AUTO: 34.6 % (ref 35–47)
HGB BLD-MCNC: 11.2 G/DL (ref 11.7–15.7)
LYMPHOCYTES # BLD AUTO: 0.9 10E9/L (ref 0.8–5.3)
LYMPHOCYTES NFR BLD AUTO: 44.4 %
MCH RBC QN AUTO: 33.1 PG (ref 26.5–33)
MCHC RBC AUTO-ENTMCNC: 32.4 G/DL (ref 31.5–36.5)
MCV RBC AUTO: 102 FL (ref 78–100)
MONOCYTES # BLD AUTO: 0.3 10E9/L (ref 0–1.3)
MONOCYTES NFR BLD AUTO: 14.3 %
NEUTROPHILS # BLD AUTO: 0.7 10E9/L (ref 1.6–8.3)
NEUTROPHILS NFR BLD AUTO: 35.2 %
PLATELET # BLD AUTO: 118 10E9/L (ref 150–450)
RBC # BLD AUTO: 3.38 10E12/L (ref 3.8–5.2)
WBC # BLD AUTO: 2 10E9/L (ref 4–11)

## 2017-03-01 PROCEDURE — 82565 ASSAY OF CREATININE: CPT | Performed by: INTERNAL MEDICINE

## 2017-03-01 PROCEDURE — 85025 COMPLETE CBC W/AUTO DIFF WBC: CPT | Performed by: INTERNAL MEDICINE

## 2017-03-01 PROCEDURE — 36415 COLL VENOUS BLD VENIPUNCTURE: CPT | Performed by: INTERNAL MEDICINE

## 2017-03-03 ENCOUNTER — ANTICOAGULATION THERAPY VISIT (OUTPATIENT)
Dept: NURSING | Facility: CLINIC | Age: 82
End: 2017-03-03
Payer: COMMERCIAL

## 2017-03-03 DIAGNOSIS — Z79.01 LONG-TERM (CURRENT) USE OF ANTICOAGULANTS: ICD-10-CM

## 2017-03-03 LAB — INR POINT OF CARE: 3.5 (ref 0.86–1.14)

## 2017-03-03 PROCEDURE — 85610 PROTHROMBIN TIME: CPT | Mod: QW

## 2017-03-03 PROCEDURE — 36416 COLLJ CAPILLARY BLOOD SPEC: CPT

## 2017-03-03 PROCEDURE — 99207 ZZC NO CHARGE NURSE ONLY: CPT

## 2017-03-03 NOTE — PROGRESS NOTES
ANTICOAGULATION FOLLOW-UP CLINIC VISIT    Patient Name:  Amira Arreola  Date:  3/3/2017  Contact Type:  Face to Face    SUBJECTIVE:     Patient Findings     Positives Change in diet/appetite    Comments Less greens            OBJECTIVE    INR Protime   Date Value Ref Range Status   03/03/2017 3.5 (A) 0.86 - 1.14 Final       ASSESSMENT / PLAN  INR assessment SUPRA    Recheck INR In: 1 WEEK    INR Location Clinic      Anticoagulation Summary as of 3/3/2017     INR goal 2.0-3.0   Today's INR 3.5!   Maintenance plan 4 mg (4 mg x 1) on Tue, Thu, Sat; 6 mg (4 mg x 1.5) all other days   Full instructions 4 mg on Tue, Thu, Sat; 6 mg all other days   Weekly total 36 mg   Plan last modified Dayana Barrera RN (3/3/2017)   Next INR check 3/10/2017   Target end date Indefinite    Indications   Long-term (current) use of anticoagulants [Z79.01] [Z79.01]  Atrial fibrillation (H) [I48.91] (Resolved) [I48.91]         Anticoagulation Episode Summary     INR check location     Preferred lab     Send INR reminders to [facility]    Comments PATIENT TAKES WARFARIN IN THE MORNING      Anticoagulation Care Providers     Provider Role Specialty Phone number    Addy Frias MD Responsible Internal Medicine [phone]            See the Encounter Report to view Anticoagulation Flowsheet and Dosing Calendar (Go to Encounters tab in chart review, and find the Anticoagulation Therapy Visit)    Dosage adjustment made based on physician directed care plan.    3.5 today.  Takes 4 mg Tues, Thu, Sat;  6 mg all other days.  Recheck in 1 week.     Dayana Barrera RN

## 2017-03-03 NOTE — MR AVS SNAPSHOT
Amira Arreola   3/3/2017 9:45 AM   Anticoagulation Therapy Visit    Description:  84 year old female   Provider:  [facility]   Department:  Ec Nurse           INR as of 3/3/2017     Today's INR 3.5!      Anticoagulation Summary as of 3/3/2017     INR goal 2.0-3.0   Today's INR 3.5!   Full instructions 4 mg on Tue, Thu, Sat; 6 mg all other days   Next INR check 3/10/2017    Indications   Long-term (current) use of anticoagulants [Z79.01] [Z79.01]  Atrial fibrillation (H) [I48.91] (Resolved) [I48.91]         Description     3.5 today.  Takes 4 mg Tues, Thu, Sat;  6 mg all other days.  Recheck in 1 week.         Your next Anticoagulation Clinic appointment(s)     Mar 03, 2017  9:45 AM CST   Anticoagulation Visit with [facility]   [facility]    [address] [place] [id number]   [phone]            Mar 10, 2017  9:45 AM CST   Anticoagulation Visit with [facility]   [facility] ([address] [address]   [phone]              Contact Numbers     Clinic Number:         March 2017 Details    Sun Mon Tue Wed Thu Fri Sat        1               2               3      6 mg   See details      4      4 mg           5      6 mg         6      6 mg         7      4 mg         8      6 mg         9      4 mg         10            11                 12               13               14               15               16               17               18                 19               20               21               22               23               24               25                 26               27               28               29               30               31                 Date Details   03/03 This INR check       Date of next INR:  3/10/2017         How to take your warfarin dose     To take:  4 mg Take 1  of the 4 mg tablets.    To take:  6 mg Take 1.5 of the 4 mg tablets.

## 2017-03-08 DIAGNOSIS — C90.01 MULTIPLE MYELOMA IN REMISSION (H): ICD-10-CM

## 2017-03-08 LAB
BASOPHILS # BLD AUTO: 0 10E9/L (ref 0–0.2)
BASOPHILS NFR BLD AUTO: 0.9 %
DIFFERENTIAL METHOD BLD: ABNORMAL
EOSINOPHIL # BLD AUTO: 0.1 10E9/L (ref 0–0.7)
EOSINOPHIL NFR BLD AUTO: 3.2 %
ERYTHROCYTE [DISTWIDTH] IN BLOOD BY AUTOMATED COUNT: 15.3 % (ref 10–15)
HCT VFR BLD AUTO: 36.2 % (ref 35–47)
HGB BLD-MCNC: 12.2 G/DL (ref 11.7–15.7)
LYMPHOCYTES # BLD AUTO: 0.8 10E9/L (ref 0.8–5.3)
LYMPHOCYTES NFR BLD AUTO: 37.8 %
MCH RBC QN AUTO: 33.9 PG (ref 26.5–33)
MCHC RBC AUTO-ENTMCNC: 33.7 G/DL (ref 31.5–36.5)
MCV RBC AUTO: 101 FL (ref 78–100)
MONOCYTES # BLD AUTO: 0.3 10E9/L (ref 0–1.3)
MONOCYTES NFR BLD AUTO: 12 %
NEUTROPHILS # BLD AUTO: 1 10E9/L (ref 1.6–8.3)
NEUTROPHILS NFR BLD AUTO: 46.1 %
PLATELET # BLD AUTO: 149 10E9/L (ref 150–450)
RBC # BLD AUTO: 3.6 10E12/L (ref 3.8–5.2)
WBC # BLD AUTO: 2.2 10E9/L (ref 4–11)

## 2017-03-08 PROCEDURE — 36415 COLL VENOUS BLD VENIPUNCTURE: CPT | Performed by: INTERNAL MEDICINE

## 2017-03-08 PROCEDURE — 85025 COMPLETE CBC W/AUTO DIFF WBC: CPT | Performed by: INTERNAL MEDICINE

## 2017-03-10 ENCOUNTER — ANTICOAGULATION THERAPY VISIT (OUTPATIENT)
Dept: NURSING | Facility: CLINIC | Age: 82
End: 2017-03-10
Payer: COMMERCIAL

## 2017-03-10 DIAGNOSIS — Z79.01 LONG-TERM (CURRENT) USE OF ANTICOAGULANTS: ICD-10-CM

## 2017-03-10 LAB — INR POINT OF CARE: 3.1 (ref 0.86–1.14)

## 2017-03-10 PROCEDURE — 85610 PROTHROMBIN TIME: CPT | Mod: QW

## 2017-03-10 PROCEDURE — 36416 COLLJ CAPILLARY BLOOD SPEC: CPT

## 2017-03-10 NOTE — MR AVS SNAPSHOT
Amira Arreola   3/10/2017 11:30 AM   Anticoagulation Therapy Visit    Description:  84 year old female   Provider:  [facility]   Department:  Ec Nurse           INR as of 3/10/2017     Today's INR 3.1!      Anticoagulation Summary as of 3/10/2017     INR goal 2.0-3.0   Today's INR 3.1!   Full instructions 6 mg on Mon, Wed, Fri; 4 mg all other days   Next INR check 3/24/2017    Indications   Long-term (current) use of anticoagulants [Z79.01] [Z79.01]  Atrial fibrillation (H) [I48.91] (Resolved) [I48.91]         Description     3.1 today.  Takes 6 mg on Mon, Wed, Fri;  4 mg all other days.  Recheck in 2 weeks.         Your next Anticoagulation Clinic appointment(s)     Mar 10, 2017 11:30 AM CST   Anticoagulation Visit with [facility]   [facility]    [address] [place] [phone]   [phone]            Mar 24, 2017  9:45 AM CDT   Anticoagulation Visit with [facility]   [facility] ([address] [address]   [phone]              Contact Numbers     Clinic Number:         March 2017 Details    Sun Mon Tue Wed Thu Fri Sat        1               2               3               4                 5               6               7               8               9               10      6 mg   See details      11      4 mg           12      4 mg         13      6 mg         14      4 mg         15      6 mg         16      4 mg         17      6 mg         18      4 mg           19      4 mg         20      6 mg         21      4 mg         22      6 mg         23      4 mg         24            25                 26               27               28               29               30               31                 Date Details   03/10 This INR check       Date of next INR:  3/24/2017         How to take your  warfarin dose     To take:  4 mg Take 1 of the 4 mg tablets.    To take:  6 mg Take 1.5 of the 4 mg tablets.

## 2017-03-10 NOTE — PROGRESS NOTES
ANTICOAGULATION FOLLOW-UP CLINIC VISIT    Patient Name:  Amira Arreola  Date:  3/10/2017  Contact Type:  Face to Face    SUBJECTIVE:     Patient Findings     Positives No Problem Findings           OBJECTIVE    INR Protime   Date Value Ref Range Status   03/10/2017 3.1 (A) 0.86 - 1.14 Final       ASSESSMENT / PLAN  INR assessment THER    Recheck INR In: 2 WEEKS    INR Location Clinic      Anticoagulation Summary as of 3/10/2017     INR goal 2.0-3.0   Today's INR 3.1!   Maintenance plan 6 mg (4 mg x 1.5) on Mon, Wed, Fri; 4 mg (4 mg x 1) all other days   Full instructions 6 mg on Mon, Wed, Fri; 4 mg all other days   Weekly total 34 mg   Plan last modified Dayana Barrera RN (3/10/2017)   Next INR check 3/24/2017   Target end date Indefinite    Indications   Long-term (current) use of anticoagulants [Z79.01] [Z79.01]  Atrial fibrillation (H) [I48.91] (Resolved) [I48.91]         Anticoagulation Episode Summary     INR check location     Preferred lab     Send INR reminders to [facility]    Comments PATIENT TAKES WARFARIN IN THE MORNING      Anticoagulation Care Providers     Provider Role Specialty Phone number    Addy Frias MD Responsible Internal Medicine [phone]            See the Encounter Report to view Anticoagulation Flowsheet and Dosing Calendar (Go to Encounters tab in chart review, and find the Anticoagulation Therapy Visit)    Dosage adjustment made based on physician directed care plan.    3.1 today.  Takes 6 mg on Mon, Wed, Fri;  4 mg all other days.  Recheck in 2 weeks.       Dayana Barrera RN

## 2017-03-21 ENCOUNTER — TRANSFERRED RECORDS (OUTPATIENT)
Dept: HEALTH INFORMATION MANAGEMENT | Facility: CLINIC | Age: 82
End: 2017-03-21

## 2017-03-21 ENCOUNTER — INFUSION THERAPY VISIT (OUTPATIENT)
Dept: INFUSION THERAPY | Facility: CLINIC | Age: 82
End: 2017-03-21
Attending: INTERNAL MEDICINE
Payer: MEDICARE

## 2017-03-21 ENCOUNTER — HOSPITAL ENCOUNTER (OUTPATIENT)
Facility: CLINIC | Age: 82
Setting detail: SPECIMEN
Discharge: HOME | End: 2017-03-21
Attending: INTERNAL MEDICINE | Admitting: INTERNAL MEDICINE
Payer: MEDICARE

## 2017-03-21 ENCOUNTER — ONCOLOGY VISIT (OUTPATIENT)
Dept: ONCOLOGY | Facility: CLINIC | Age: 82
End: 2017-03-21
Attending: INTERNAL MEDICINE
Payer: MEDICARE

## 2017-03-21 VITALS — WEIGHT: 127.87 LBS | BODY MASS INDEX: 20.55 KG/M2 | HEIGHT: 66 IN

## 2017-03-21 VITALS
HEART RATE: 74 BPM | DIASTOLIC BLOOD PRESSURE: 76 MMHG | TEMPERATURE: 97.3 F | SYSTOLIC BLOOD PRESSURE: 146 MMHG | WEIGHT: 127.8 LBS | BODY MASS INDEX: 20.63 KG/M2 | RESPIRATION RATE: 16 BRPM | OXYGEN SATURATION: 98 %

## 2017-03-21 DIAGNOSIS — C90.00 MULTIPLE MYELOMA NOT HAVING ACHIEVED REMISSION (H): Primary | ICD-10-CM

## 2017-03-21 LAB
ALBUMIN SERPL-MCNC: 3.4 G/DL (ref 3.4–5)
ALP SERPL-CCNC: 42 U/L (ref 40–150)
ALT SERPL W P-5'-P-CCNC: 25 U/L (ref 0–50)
AST SERPL W P-5'-P-CCNC: 20 U/L (ref 0–45)
BASOPHILS # BLD AUTO: 0 10E9/L (ref 0–0.2)
BASOPHILS NFR BLD AUTO: 0 %
BILIRUB DIRECT SERPL-MCNC: 0.1 MG/DL (ref 0–0.2)
BILIRUB SERPL-MCNC: 0.4 MG/DL (ref 0.2–1.3)
DIFFERENTIAL METHOD BLD: ABNORMAL
EOSINOPHIL # BLD AUTO: 0.1 10E9/L (ref 0–0.7)
EOSINOPHIL NFR BLD AUTO: 3.1 %
ERYTHROCYTE [DISTWIDTH] IN BLOOD BY AUTOMATED COUNT: 15.3 % (ref 10–15)
HCT VFR BLD AUTO: 36.1 % (ref 35–47)
HGB BLD-MCNC: 12 G/DL (ref 11.7–15.7)
IMM GRANULOCYTES # BLD: 0 10E9/L (ref 0–0.4)
IMM GRANULOCYTES NFR BLD: 0 %
LYMPHOCYTES # BLD AUTO: 1 10E9/L (ref 0.8–5.3)
LYMPHOCYTES NFR BLD AUTO: 31.7 %
MCH RBC QN AUTO: 33.1 PG (ref 26.5–33)
MCHC RBC AUTO-ENTMCNC: 33.2 G/DL (ref 31.5–36.5)
MCV RBC AUTO: 99 FL (ref 78–100)
MONOCYTES # BLD AUTO: 0.3 10E9/L (ref 0–1.3)
MONOCYTES NFR BLD AUTO: 10.2 %
NEUTROPHILS # BLD AUTO: 1.8 10E9/L (ref 1.6–8.3)
NEUTROPHILS NFR BLD AUTO: 55 %
NRBC # BLD AUTO: 0 10*3/UL
NRBC BLD AUTO-RTO: 0 /100
PLATELET # BLD AUTO: 125 10E9/L (ref 150–450)
PROT SERPL-MCNC: 5.9 G/DL (ref 6.8–8.8)
RBC # BLD AUTO: 3.63 10E12/L (ref 3.8–5.2)
WBC # BLD AUTO: 3.2 10E9/L (ref 4–11)

## 2017-03-21 PROCEDURE — 99212 OFFICE O/P EST SF 10 MIN: CPT | Mod: 25

## 2017-03-21 PROCEDURE — 36415 COLL VENOUS BLD VENIPUNCTURE: CPT

## 2017-03-21 PROCEDURE — 80076 HEPATIC FUNCTION PANEL: CPT | Performed by: INTERNAL MEDICINE

## 2017-03-21 PROCEDURE — 00000402 ZZHCL STATISTIC TOTAL PROTEIN: Performed by: INTERNAL MEDICINE

## 2017-03-21 PROCEDURE — 99215 OFFICE O/P EST HI 40 MIN: CPT | Performed by: INTERNAL MEDICINE

## 2017-03-21 PROCEDURE — 83883 ASSAY NEPHELOMETRY NOT SPEC: CPT | Performed by: INTERNAL MEDICINE

## 2017-03-21 PROCEDURE — 96401 CHEMO ANTI-NEOPL SQ/IM: CPT

## 2017-03-21 PROCEDURE — 85025 COMPLETE CBC W/AUTO DIFF WBC: CPT | Performed by: INTERNAL MEDICINE

## 2017-03-21 PROCEDURE — 84165 PROTEIN E-PHORESIS SERUM: CPT | Performed by: INTERNAL MEDICINE

## 2017-03-21 PROCEDURE — 25000128 H RX IP 250 OP 636: Performed by: INTERNAL MEDICINE

## 2017-03-21 RX ORDER — METHYLPREDNISOLONE SODIUM SUCCINATE 125 MG/2ML
125 INJECTION, POWDER, LYOPHILIZED, FOR SOLUTION INTRAMUSCULAR; INTRAVENOUS
Status: CANCELLED
Start: 2017-03-21

## 2017-03-21 RX ORDER — DEXAMETHASONE 4 MG/1
20 TABLET ORAL WEEKLY
Qty: 20 TABLET | Refills: 0 | Status: SHIPPED | OUTPATIENT
Start: 2017-03-21 | End: 2017-04-11

## 2017-03-21 RX ORDER — LORAZEPAM 0.5 MG/1
0.5 TABLET ORAL EVERY 4 HOURS PRN
Qty: 30 TABLET | Refills: 5 | Status: SHIPPED | OUTPATIENT
Start: 2017-03-21 | End: 2017-05-23 | Stop reason: ALTCHOICE

## 2017-03-21 RX ORDER — ACYCLOVIR 400 MG/1
400 TABLET ORAL 2 TIMES DAILY
Qty: 60 TABLET | Refills: 11 | Status: SHIPPED | OUTPATIENT
Start: 2017-03-21 | End: 2018-03-28

## 2017-03-21 RX ORDER — EPINEPHRINE 0.3 MG/.3ML
0.3 INJECTION SUBCUTANEOUS EVERY 5 MIN PRN
Status: CANCELLED | OUTPATIENT
Start: 2017-03-28

## 2017-03-21 RX ORDER — ALBUTEROL SULFATE 90 UG/1
1-2 AEROSOL, METERED RESPIRATORY (INHALATION)
Status: CANCELLED
Start: 2017-03-21

## 2017-03-21 RX ORDER — ACYCLOVIR 400 MG/1
400 TABLET ORAL 2 TIMES DAILY
Qty: 60 TABLET | Refills: 11 | Status: SHIPPED | OUTPATIENT
Start: 2017-03-21 | End: 2017-03-21

## 2017-03-21 RX ORDER — LORAZEPAM 2 MG/ML
0.5 INJECTION INTRAMUSCULAR EVERY 4 HOURS PRN
Status: CANCELLED
Start: 2017-03-21

## 2017-03-21 RX ORDER — MEPERIDINE HYDROCHLORIDE 50 MG/ML
25 INJECTION INTRAMUSCULAR; INTRAVENOUS; SUBCUTANEOUS EVERY 30 MIN PRN
Status: CANCELLED | OUTPATIENT
Start: 2017-03-28

## 2017-03-21 RX ORDER — SODIUM CHLORIDE 9 MG/ML
1000 INJECTION, SOLUTION INTRAVENOUS CONTINUOUS PRN
Status: CANCELLED
Start: 2017-03-28

## 2017-03-21 RX ORDER — MEPERIDINE HYDROCHLORIDE 50 MG/ML
25 INJECTION INTRAMUSCULAR; INTRAVENOUS; SUBCUTANEOUS EVERY 30 MIN PRN
Status: CANCELLED | OUTPATIENT
Start: 2017-03-21

## 2017-03-21 RX ORDER — EPINEPHRINE 1 MG/ML
0.3 INJECTION INTRAMUSCULAR; INTRAVENOUS; SUBCUTANEOUS EVERY 5 MIN PRN
Status: CANCELLED | OUTPATIENT
Start: 2017-03-21

## 2017-03-21 RX ORDER — METHYLPREDNISOLONE SODIUM SUCCINATE 125 MG/2ML
125 INJECTION, POWDER, LYOPHILIZED, FOR SOLUTION INTRAMUSCULAR; INTRAVENOUS
Status: CANCELLED
Start: 2017-03-28

## 2017-03-21 RX ORDER — ALBUTEROL SULFATE 0.83 MG/ML
2.5 SOLUTION RESPIRATORY (INHALATION)
Status: CANCELLED | OUTPATIENT
Start: 2017-03-28

## 2017-03-21 RX ORDER — ALBUTEROL SULFATE 0.83 MG/ML
2.5 SOLUTION RESPIRATORY (INHALATION)
Status: CANCELLED | OUTPATIENT
Start: 2017-03-21

## 2017-03-21 RX ORDER — EPINEPHRINE 0.3 MG/.3ML
0.3 INJECTION SUBCUTANEOUS EVERY 5 MIN PRN
Status: CANCELLED | OUTPATIENT
Start: 2017-03-21

## 2017-03-21 RX ORDER — PROCHLORPERAZINE MALEATE 10 MG
10 TABLET ORAL EVERY 6 HOURS PRN
Qty: 30 TABLET | Refills: 5 | Status: SHIPPED | OUTPATIENT
Start: 2017-03-21 | End: 2017-05-23 | Stop reason: ALTCHOICE

## 2017-03-21 RX ORDER — DEXAMETHASONE 4 MG/1
20 TABLET ORAL WEEKLY
Qty: 20 TABLET | Refills: 0 | Status: SHIPPED | OUTPATIENT
Start: 2017-03-21 | End: 2017-03-21

## 2017-03-21 RX ORDER — DIPHENHYDRAMINE HYDROCHLORIDE 50 MG/ML
50 INJECTION INTRAMUSCULAR; INTRAVENOUS
Status: CANCELLED
Start: 2017-03-28

## 2017-03-21 RX ORDER — LORAZEPAM 2 MG/ML
0.5 INJECTION INTRAMUSCULAR EVERY 4 HOURS PRN
Status: CANCELLED
Start: 2017-03-28

## 2017-03-21 RX ORDER — ALBUTEROL SULFATE 90 UG/1
1-2 AEROSOL, METERED RESPIRATORY (INHALATION)
Status: CANCELLED
Start: 2017-03-28

## 2017-03-21 RX ORDER — DIPHENHYDRAMINE HYDROCHLORIDE 50 MG/ML
50 INJECTION INTRAMUSCULAR; INTRAVENOUS
Status: CANCELLED
Start: 2017-03-21

## 2017-03-21 RX ORDER — EPINEPHRINE 1 MG/ML
0.3 INJECTION INTRAMUSCULAR; INTRAVENOUS; SUBCUTANEOUS EVERY 5 MIN PRN
Status: CANCELLED | OUTPATIENT
Start: 2017-03-28

## 2017-03-21 RX ORDER — SODIUM CHLORIDE 9 MG/ML
1000 INJECTION, SOLUTION INTRAVENOUS CONTINUOUS PRN
Status: CANCELLED
Start: 2017-03-21

## 2017-03-21 RX ADMIN — BORTEZOMIB 1.6 MG: 3.5 INJECTION, POWDER, LYOPHILIZED, FOR SOLUTION INTRAVENOUS; SUBCUTANEOUS at 13:21

## 2017-03-21 ASSESSMENT — PAIN SCALES - GENERAL: PAINLEVEL: NO PAIN (0)

## 2017-03-21 NOTE — PROGRESS NOTES
"Amira Arreola is a 84 year old female who presents for:  Chief Complaint   Patient presents with     Oncology Clinic Visit     multiple myeloma         Initial Vitals:  /76 (BP Location: Left arm, Patient Position: Chair, Cuff Size: Adult Regular)  Pulse 74  Temp 97.3  F (36.3  C) (Oral)  Resp 16  Wt 58 kg (127 lb 12.8 oz)  SpO2 98%  BMI 20.63 kg/m2 Estimated body mass index is 20.63 kg/(m^2) as calculated from the following:    Height as of 1/26/17: 1.676 m (5' 6\").    Weight as of this encounter: 58 kg (127 lb 12.8 oz).. Body surface area is 1.64 meters squared. BP completed using cuff size: regular  No Pain (0) No LMP recorded. Patient is postmenopausal. Allergies and medications reviewed.     Medications: Medication refills not needed today.  Pharmacy name entered into [employer]:    [id number] - [place], [place] - [address] [id number] - [facility] [address] [facility] [address]  [id number] - [place], [place] - [address] AT [facility] OF  [address]    Comments: Follow up multiple myeloma    6 minutes for nursing intake (face to face time)   Zora Bentley MA    DISCHARGE PLAN:    Labs today in infusion with C1D1 Velcade: Reported to SHELLIE Gruber/ Infusion  Weekly velcade arranged with md follow up on 4/4/17  AVS was given to her      Velcade information given to her ( Lopez/ pharmacy will discuss)  Called [facility]([phone])  for records, also left Dr. Roberts's contact information for Dr. Ramos to connect with him  [facility] id #[id number]      Next appointments: See patient instruction section  Departure Mode: Ambulatory  Accompanied by: Self  15 minutes for nursing discharge (face to face time)   Tameka Daigle RN     [facility] records received 11:50am 3/21/17, labeled and placed in Dr. Roberts's office  Tameka Daigle RN      "

## 2017-03-21 NOTE — MR AVS SNAPSHOT
After Visit Summary   3/21/2017    Amira Arreola    MRN: [id number]           Patient Information     Date Of Birth          7/17/1932        Visit Information        Provider Department      3/21/2017 12:30 PM  INFUSION CHAIR 4 [facility]        Today's Diagnoses     Multiple myeloma not having achieved remission (H)    -  1       Follow-ups after your visit        Your next 10 appointments already scheduled     Mar 24, 2017  9:45 AM CDT   Anticoagulation Visit with [facility] ANTICOAGULATION CLINIC   [facility] ([address] [address]   [phone]            Mar 28, 2017  9:30 AM CDT   Level 2 with  INFUSION CHAIR 9   [facility] ([facility])    [facility]  [address]  [place] [address]   [phone]            Apr 04, 2017  9:30 AM CDT   Level 2 with  INFUSION CHAIR 2   [facility] ([facility])    [facility]  [address]  [place] [address]   [phone]            Apr 04, 2017 10:00 AM CDT   Return Visit with Shayne Roberts MD   [facility] ([facility])    [facility]  [address]  [place] [address]   [phone]            Apr 11, 2017 11:00 AM CDT   Level 2 with  INFUSION CHAIR 11   [facility] ([facility])    [facility]  [address]  [place] [address]   [phone]              Who to contact     If you have questions or need follow up information about today's clinic visit or your schedule please contact [facility] directly at [phone].  Normal or non-critical lab and imaging results will be communicated to you by  "MyChart, letter or phone within 4 business days after the clinic has received the results. If you do not hear from us within 7 days, please contact the clinic through MyChart or phone. If you have a critical or abnormal lab result, we will notify you by phone as soon as possible.  Submit refill requests through SendRR or call your pharmacy and they will forward the refill request to us. Please allow 3 business days for your refill to be completed.          Additional Information About Your Visit        Care EveryWhere ID     This is your Care EveryWhere ID. This could be used by other organizations to access your [facility] medical records  [id number]        Your Vitals Were     Height BMI (Body Mass Index)                1.676 m (5' 5.98\") 20.65 kg/m2           Blood Pressure from Last 3 Encounters:   03/21/17 146/76   01/26/17 120/64   12/22/16 136/76    Weight from Last 3 Encounters:   03/21/17 58 kg (127 lb 13.9 oz)   03/21/17 58 kg (127 lb 12.8 oz)   01/26/17 59 kg (130 lb)              We Performed the Following     CBC with platelets differential     Hepatic panel     Kappa and lambda light chain     Nursing Communication 1     Protein electrophoresis     Treatment Conditions          Today's Medication Changes          These changes are accurate as of: 3/21/17  1:38 PM.  If you have any questions, ask your nurse or doctor.               Start taking these medicines.        Dose/Directions    acyclovir 400 MG tablet   Commonly known as:  ZOVIRAX   Used for:  Multiple myeloma not having achieved remission (H)        Dose:  400 mg   Take 1 tablet (400 mg) by mouth 2 times daily Viral Prophylaxis.   Quantity:  60 tablet   Refills:  11         These medicines have changed or have updated prescriptions.        Dose/Directions    * ATIVAN 0.5 MG tablet   This may have changed:  Another medication with the same name was added. Make sure you understand how and when to take each.   Used for:  Anxiety   Generic drug:  " LORazepam   Changed by:  Shayne Roberts MD        1 hour prior to MRI take 1 tablet (0.5 mg) and may repeat x's 1.   Quantity:  10 tablet   Refills:  0       * LORazepam 0.5 MG tablet   Commonly known as:  ATIVAN   This may have changed:  You were already taking a medication with the same name, and this prescription was added. Make sure you understand how and when to take each.   Used for:  Multiple myeloma not having achieved remission (H)   Changed by:  Shayne Roberts MD        Dose:  0.5 mg   Take 1 tablet (0.5 mg) by mouth every 4 hours as needed (Anxiety, Nausea/Vomiting or Sleep)   Quantity:  30 tablet   Refills:  5       * COMPAZINE PO   This may have changed:  Another medication with the same name was added. Make sure you understand how and when to take each.   Changed by:  Addy Frias MD        Dose:  10 mg   Take 10 mg by mouth   Refills:  0       * prochlorperazine 10 MG tablet   Commonly known as:  COMPAZINE   This may have changed:  You were already taking a medication with the same name, and this prescription was added. Make sure you understand how and when to take each.   Used for:  Multiple myeloma not having achieved remission (H)   Changed by:  Shayne Roberts MD        Dose:  10 mg   Take 1 tablet (10 mg) by mouth every 6 hours as needed (Nausea/Vomiting)   Quantity:  30 tablet   Refills:  5       * DEXAMETHASONE PO   This may have changed:  Another medication with the same name was added. Make sure you understand how and when to take each.   Changed by:  Addy Frias MD        Dose:  4 mg   Take 4 mg by mouth Reported on 3/21/2017   Refills:  0       * dexamethasone 4 MG tablet   Commonly known as:  DECADRON   This may have changed:  You were already taking a medication with the same name, and this prescription was added. Make sure you understand how and when to take each.   Used for:  Multiple myeloma not having achieved remission (H)        Dose:  20 mg   Take 5 tablets  (20 mg) by mouth once a week   Quantity:  20 tablet   Refills:  0       * Notice:  This list has 6 medication(s) that are the same as other medications prescribed for you. Read the directions carefully, and ask your doctor or other care provider to review them with you.         Where to get your medicines      These medications were sent to [id number] - [place], [place] - [address] AT [facility] [address]  [address], [place] [address]     Phone:  [phone]     acyclovir 400 MG tablet    dexamethasone 4 MG tablet         Some of these will need a paper prescription and others can be bought over the counter.  Ask your nurse if you have questions.     Bring a paper prescription for each of these medications     LORazepam 0.5 MG tablet    prochlorperazine 10 MG tablet                Primary Care Provider Office Phone # Fax #    Addy Frias [phone]       [facility] [address] [address]        Thank you!     Thank you for choosing [facility]  for your care. Our goal is always to provide you with excellent care. Hearing back from our patients is one way we can continue to improve our services. Please take a few minutes to complete the written survey that you may receive in the mail after your visit with us. Thank you!             Your Updated Medication List - Protect others around you: Learn how to safely use, store and throw away your medicines at www.disposemymeds.org.          This list is accurate as of: 3/21/17  1:38 PM.  Always use your most recent med list.                   Brand Name Dispense Instructions for use    acyclovir 400 MG tablet    ZOVIRAX    60 tablet    Take 1 tablet (400 mg) by mouth 2 times daily Viral Prophylaxis.       ASPIRIN NOT PRESCRIBED    INTENTIONAL    0 each    Antiplatelet medication not prescribed intentionally due to Current anticoagulant therapy  (warfarin/enoxaparin)       * ATIVAN 0.5 MG tablet   Generic drug:  LORazepam     10 tablet    1 hour prior to MRI take 1 tablet (0.5 mg) and may repeat x's 1.       * LORazepam 0.5 MG tablet    ATIVAN    30 tablet    Take 1 tablet (0.5 mg) by mouth every 4 hours as needed (Anxiety, Nausea/Vomiting or Sleep)       CALCIUM CITRATE + PO      Take 2,000 mg by mouth 2 tabs       carboxymethylcellulose 0.5 % Soln ophthalmic solution    REFRESH PLUS     1 drop 4 times daily       CLARINEX PO          * COMPAZINE PO      Take 10 mg by mouth       * prochlorperazine 10 MG tablet    COMPAZINE    30 tablet    Take 1 tablet (10 mg) by mouth every 6 hours as needed (Nausea/Vomiting)       cycloSPORINE 0.05 % ophthalmic emulsion    RESTASIS     Place 1 drop into both eyes every 12 hours       DAILY MULTIVITAMIN PO      Take 1 tablet by mouth daily.       * DEXAMETHASONE PO      Take 4 mg by mouth Reported on 3/21/2017       * dexamethasone 4 MG tablet    DECADRON    20 tablet    Take 5 tablets (20 mg) by mouth once a week       erythromycin ophthalmic ointment    ROMYCIN     Place 1 Application into both eyes At Bedtime       GENTLE STOOL SOFTENER PO      Take 100 mg by mouth daily       metoprolol 50 MG 24 hr tablet    TOPROL-XL    180 tablet    Take 1 tablet (50 mg) by mouth 2 times daily       OXYCODONE HCL PO      Take by mouth as needed       polyethylene glycol powder    MIRALAX/GLYCOLAX     Take 1 capful by mouth daily       timolol 0.25 % ophthalmic solution    TIMOPTIC     1 drop every morning       TYLENOL PO      Take 500 mg by mouth every 6 hours as needed for mild pain or fever       UNABLE TO FIND      MEDICATION NAME: Fresh Coat eye drops       VITAMIN D3 PO      Take 1,000 Units by mouth daily       warfarin 4 MG tablet    COUMADIN    90 tablet    Takes 4 mg on Tues, Sat,  6 mg all other days or as directed by the ACC.       ZOMETA IV      Inject into the vein every 30 days Reported on 3/21/2017       * Notice:   This list has 6 medication(s) that are the same as other medications prescribed for you. Read the directions carefully, and ask your doctor or other care provider to review them with you.

## 2017-03-21 NOTE — PROGRESS NOTES
Infusion Nursing Note:  Amira Arreola presents today for C1 D1 Velcade.    Patient seen by provider today: Yes: Dr. Roberts   present during visit today: Not Applicable.    Note: First time getting chemotherapy today. Oriented to infusion center. Chemotherapy teaching, side effects, and schedule reviewed with patient. General and individual chemotherapy side effects reviewed with patient including fatigue, immunosuppression, nausea/vomiting, constipation/diarrhea and peripheral neuropathy. Per Tameka Daigle RN, patient received chemo education in clinic and was given printouts on velcade. Pt instructed to call care coordinator, triage (or MD on call if after hours/weekends) with chills/temp >=100.5, questions/concerns. Pt stated understanding of plan.     Per pharmacy staff, patient [facility]. Instructions and side effects reviewed with patient while in infusion. Patient will fill ativan and compazine if needed. Patient verbalizes understanding of correct dosing/schedule of oral dexamethasone and acyclovir as ordered.      Intravenous Access:  Lab draw site Right AC, Needle type butterfly, Gauge 23.  Labs drawn without difficulty.    Treatment Conditions:  Lab Results   Component Value Date    HGB 12.0 03/21/2017     Lab Results   Component Value Date    WBC 3.2 03/21/2017      Lab Results   Component Value Date    ANEU 1.8 03/21/2017     Lab Results   Component Value Date     03/21/2017          Lab Results   Component Value Date    BILITOTAL 0.4 03/21/2017           Lab Results   Component Value Date    ALBUMIN 3.4 03/21/2017                    Lab Results   Component Value Date    ALT 25 03/21/2017           Lab Results   Component Value Date    AST 20 03/21/2017     Results reviewed, labs MET treatment parameters, ok to proceed with treatment.      Post Infusion Assessment:  Patient tolerated injection without incident. Velcade given SQ in right  mid-abdomen.  Site patent and intact, free from redness, edema or discomfort.    Discharge Plan:   Discharge instructions reviewed with: Patient.  Patient and/or family verbalized understanding of discharge instructions and all questions answered.  Copy of AVS given in clinic.  Patient will return 3/28 for next appointment.  Patient discharged in stable condition accompanied by: self.  Departure Mode: Ambulatory.    Allyn Corona RN

## 2017-03-21 NOTE — PROGRESS NOTES
Patient was educated on the following chemotherapy medications: Velcade Subq on March 21, 2017. Teaching provided to patient included indication, dose, administration, adverse effects and side effect management. Written materials were provided and patient was given the opportunity to ask questions. Patient verbalized understanding of the information presented. 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    Lopez Funk PharmD.

## 2017-03-21 NOTE — MR AVS SNAPSHOT
After Visit Summary   3/21/2017    Amira Arreola    MRN: [id number]           Patient Information     Date Of Birth          7/17/1932        Visit Information        Provider Department      3/21/2017 11:00 AM Shayne Roberts MD [facility]        Today's Diagnoses     Multiple myeloma not having achieved remission (H)    -  1      Care Instructions    Labs today.  Start velcade and dexamethasone.  FU in 2 weeks.        Follow-ups after your visit        Your next 10 appointments already scheduled     Mar 24, 2017  9:45 AM CDT   Anticoagulation Visit with EC ANTICOAGULATION CLINIC   [facility] ([address] [address]   [phone]            Mar 28, 2017  9:30 AM CDT   Level 2 with SH INFUSION CHAIR 9   [facility]fusion [facility] ([facility])    [facility]  [address]  [place] [address] [phone]            Apr 04, 2017  9:30 AM CDT   Level 2 with SH INFUSION CHAIR 2   [facility] ([facility])    [facility]  [address]  [place] [address] [phone]            Apr 04, 2017 10:00 AM CDT   Return Visit with Shayne Roberts MD   [facility] ([facility])    [facility]  [address]  [place] [address] [phone]            Apr 11, 2017 11:00 AM CDT   Level 2 with SH INFUSION CHAIR 11   [facility] ([facility])    [facility]  [address]  [place] [address] [phone]              Who to contact     If you have questions or need follow up information about today's clinic visit or your schedule please contact [facility] directly at [phone].  Normal or non-critical lab and  imaging results will be communicated to you by MyChart, letter or phone within 4 business days after the clinic has received the results. If you do not hear from us within 7 days, please contact the clinic through MyChart or phone. If you have a critical or abnormal lab result, we will notify you by phone as soon as possible.  Submit refill requests through [employer] or call your pharmacy and they will forward the refill request to us. Please allow 3 business days for your refill to be completed.          Additional Information About Your Visit        Care EveryWhere ID     This is your Care EveryWhere ID. This could be used by other organizations to access your [facility] medical records  [id number]        Your Vitals Were     Pulse Temperature Respirations Pulse Oximetry BMI (Body Mass Index)       74 97.3  F (36.3  C) (Oral) 16 98% 20.63 kg/m2        Blood Pressure from Last 3 Encounters:   03/21/17 146/76   01/26/17 120/64   12/22/16 136/76    Weight from Last 3 Encounters:   03/21/17 58 kg (127 lb 13.9 oz)   03/21/17 58 kg (127 lb 12.8 oz)   01/26/17 59 kg (130 lb)              Today, you had the following     No orders found for display         Today's Medication Changes          These changes are accurate as of: 3/21/17  1:03 PM.  If you have any questions, ask your nurse or doctor.               Start taking these medicines.        Dose/Directions    acyclovir 400 MG tablet   Commonly known as:  ZOVIRAX   Used for:  Multiple myeloma not having achieved remission (H)   Started by:  Shayne Roberts MD        Dose:  400 mg   Take 1 tablet (400 mg) by mouth 2 times daily Viral Prophylaxis.   Quantity:  60 tablet   Refills:  11         These medicines have changed or have updated prescriptions.        Dose/Directions    * ATIVAN 0.5 MG tablet   This may have changed:  Another medication with the same name was added. Make sure you understand how and when to take each.   Used for:  Anxiety   Generic drug:  LORazepam    Changed by:  Shayne Roberts MD        1 hour prior to MRI take 1 tablet (0.5 mg) and may repeat x's 1.   Quantity:  10 tablet   Refills:  0       * LORazepam 0.5 MG tablet   Commonly known as:  ATIVAN   This may have changed:  You were already taking a medication with the same name, and this prescription was added. Make sure you understand how and when to take each.   Used for:  Multiple myeloma not having achieved remission (H)   Changed by:  Shayne Roberts MD        Dose:  0.5 mg   Take 1 tablet (0.5 mg) by mouth every 4 hours as needed (Anxiety, Nausea/Vomiting or Sleep)   Quantity:  30 tablet   Refills:  5       * COMPAZINE PO   This may have changed:  Another medication with the same name was added. Make sure you understand how and when to take each.   Changed by:  Addy Frias MD        Dose:  10 mg   Take 10 mg by mouth   Refills:  0       * prochlorperazine 10 MG tablet   Commonly known as:  COMPAZINE   This may have changed:  You were already taking a medication with the same name, and this prescription was added. Make sure you understand how and when to take each.   Used for:  Multiple myeloma not having achieved remission (H)   Changed by:  Shayne Roberts MD        Dose:  10 mg   Take 1 tablet (10 mg) by mouth every 6 hours as needed (Nausea/Vomiting)   Quantity:  30 tablet   Refills:  5       * DEXAMETHASONE PO   This may have changed:  Another medication with the same name was added. Make sure you understand how and when to take each.   Changed by:  Addy Frias MD        Dose:  4 mg   Take 4 mg by mouth Reported on 3/21/2017   Refills:  0       * dexamethasone 4 MG tablet   Commonly known as:  DECADRON   This may have changed:  You were already taking a medication with the same name, and this prescription was added. Make sure you understand how and when to take each.   Used for:  Multiple myeloma not having achieved remission (H)        Dose:  20 mg   Take 5 tablets (20 mg) by  mouth once a week   Quantity:  20 tablet   Refills:  0       * Notice:  This list has 6 medication(s) that are the same as other medications prescribed for you. Read the directions carefully, and ask your doctor or other care provider to review them with you.         Where to get your medicines      These medications were sent to [facility] [place], [place] - [address]  [address], [place] [address]     Phone:  [phone]     acyclovir 400 MG tablet    dexamethasone 4 MG tablet         Some of these will need a paper prescription and others can be bought over the counter.  Ask your nurse if you have questions.     Bring a paper prescription for each of these medications     LORazepam 0.5 MG tablet    prochlorperazine 10 MG tablet                Primary Care Provider Office Phone # Fax #    Addy Frias [phone]       [facility] [address]  [place] [address]        Thank you!     Thank you for choosing [facility]  for your care. Our goal is always to provide you with excellent care. Hearing back from our patients is one way we can continue to improve our services. Please take a few minutes to complete the written survey that you may receive in the mail after your visit with us. Thank you!             Your Updated Medication List - Protect others around you: Learn how to safely use, store and throw away your medicines at www.disposemymeds.org.          This list is accurate as of: 3/21/17  1:03 PM.  Always use your most recent med list.                   Brand Name Dispense Instructions for use    acyclovir 400 MG tablet    ZOVIRAX    60 tablet    Take 1 tablet (400 mg) by mouth 2 times daily Viral Prophylaxis.       ASPIRIN NOT PRESCRIBED    INTENTIONAL    0 each    Antiplatelet medication not prescribed intentionally due to Current anticoagulant therapy (warfarin/enoxaparin)       * ATIVAN 0.5 MG tablet   Generic  drug:  LORazepam     10 tablet    1 hour prior to MRI take 1 tablet (0.5 mg) and may repeat x's 1.       * LORazepam 0.5 MG tablet    ATIVAN    30 tablet    Take 1 tablet (0.5 mg) by mouth every 4 hours as needed (Anxiety, Nausea/Vomiting or Sleep)       CALCIUM CITRATE + PO      Take 2,000 mg by mouth 2 tabs       carboxymethylcellulose 0.5 % Soln ophthalmic solution    REFRESH PLUS     1 drop 4 times daily       CLARINEX PO          * COMPAZINE PO      Take 10 mg by mouth       * prochlorperazine 10 MG tablet    COMPAZINE    30 tablet    Take 1 tablet (10 mg) by mouth every 6 hours as needed (Nausea/Vomiting)       cycloSPORINE 0.05 % ophthalmic emulsion    RESTASIS     Place 1 drop into both eyes every 12 hours       DAILY MULTIVITAMIN PO      Take 1 tablet by mouth daily.       * DEXAMETHASONE PO      Take 4 mg by mouth Reported on 3/21/2017       * dexamethasone 4 MG tablet    DECADRON    20 tablet    Take 5 tablets (20 mg) by mouth once a week       erythromycin ophthalmic ointment    ROMYCIN     Place 1 Application into both eyes At Bedtime       GENTLE STOOL SOFTENER PO      Take 100 mg by mouth daily       LENalidomide 15 MG Caps capsule CHEMOTHERAPY    REVLIMID    30 capsule    Take 15 mg by mouth daily Reported on 3/21/2017       metoprolol 50 MG 24 hr tablet    TOPROL-XL    180 tablet    Take 1 tablet (50 mg) by mouth 2 times daily       OXYCODONE HCL PO      Take by mouth as needed       polyethylene glycol powder    MIRALAX/GLYCOLAX     Take 1 capful by mouth daily       timolol 0.25 % ophthalmic solution    TIMOPTIC     1 drop every morning       TYLENOL PO      Take 500 mg by mouth every 6 hours as needed for mild pain or fever       UNABLE TO FIND      MEDICATION NAME: Fresh Coat eye drops       VITAMIN D3 PO      Take 1,000 Units by mouth daily       warfarin 4 MG tablet    COUMADIN    90 tablet    Takes 4 mg on Tues, Sat,  6 mg all other days or as directed by the [facility].       ZOMETA IV      Inject  into the vein every 30 days Reported on 3/21/2017       * Notice:  This list has 6 medication(s) that are the same as other medications prescribed for you. Read the directions carefully, and ask your doctor or other care provider to review them with you.

## 2017-03-22 LAB
ALBUMIN SERPL ELPH-MCNC: 3.8 G/DL (ref 3.7–5.1)
ALPHA1 GLOB SERPL ELPH-MCNC: 0.3 G/DL (ref 0.2–0.4)
ALPHA2 GLOB SERPL ELPH-MCNC: 0.6 G/DL (ref 0.5–0.9)
B-GLOBULIN SERPL ELPH-MCNC: 0.6 G/DL (ref 0.6–1)
GAMMA GLOB SERPL ELPH-MCNC: 0.4 G/DL (ref 0.7–1.6)
KAPPA LC UR-MCNC: 52.5 MG/DL (ref 0.33–1.94)
KAPPA LC/LAMBDA SER: 51.47 {RATIO} (ref 0.26–1.65)
LAMBDA LC SERPL-MCNC: 1.02 MG/DL (ref 0.57–2.63)
M PROTEIN SERPL ELPH-MCNC: 0 G/DL
PROT PATTERN SERPL ELPH-IMP: ABNORMAL

## 2017-03-22 NOTE — PROGRESS NOTES
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     1.  She had work-up for mild thrombocytopenia.  On 09/12/2013, platelets of 155.  On 09/16/2014, platelet of 141.      2. On 09/21/2015:  - White count 4.2, hemoglobin 13.2 and platelets of 138.  MCV of 101.    -Chemistry panel normal except mildly low protein of 6.4.    3.  On 09/29/2015:      - Folate of more than 24.      - Vitamin B12 of more than 719.    - Serum protein electrophoresis did not reveal any monoclonal spike.  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    4.  On 10/02/2015:  -SPEP does not reveal any M-spike. JANET does not reveal any monoclonal protein.     -IgG, IgA and IgM level are decreased. IgG of 484, IgA of 21 and IgM of 15  5.  On 10/22/2015:    - TSH normal at 1.84.    - Urine immunofixation reveals monoclonal free immunoglobulin of light chain of kappa light chain type.    6. A 24-hour urine protein electrophoresis on 11/02/2015 revealed monoclonal peak of 4.7%.  Urine immunofixation revealed free kappa light chain.   7. Skeletal survey on 05/23/2016 does not reveal any lytic lesion.    8. Bone marrow aspiration and biopsy on 05/25/2016 40-50% kappa light chain restricted plasma cells.  Cytogenetics is normal. 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    9. MRI of bones on 06/21/2016 and 06/22/2016 reveals myeloma lesions.  10. Revlimid and dexamethasone started on 08/24/2016.  She was started on revlimid 25 mg 3 weeks on and 1 week off along with dexamethasone 20 mg weekly.  Due to cytopenia, dose was subsequently reduced to 15 mg a day.  Treatment in between had to be delayed because of cytopenia.  She did not have any significant response to treatment.     SUBJECTIVE:  Ms. Amira Arreola is an 84-year-old female with kappa free light chain multiple myeloma, standard risk.   The patient was diagnosed with kappa free light chain multiple myeloma in 05/2016.  The patient went to [facility] for a study.  As the study was on hold, she did not get enrolled in the study.      The patient was started on Revlimid on 08/24/2016.  She was started on 25 mg of revlimid 3 weeks on and 1 week off along with dexamethasone 20 mg weekly.  Due to cytopenia, dose was subsequently reduced to 15 mg a day.  Treatment in between had to be delayed because of cytopenia.  She did not have any significant response to treatment.      The patient was seen by her hematologist Dr. Neela Ramos.  at [facility].  Her recommendation is to change treatment.  For convenience, patient would like to get treatment locally.      Overall, she is doing well for her age.  She has fatigue.  She also has back pain.  No headache.  No dizziness.  No chest pain.  No shortness of breath.  No nausea.  No vomiting.  Appetite has been fairly good.  No fever or chills.  No recurrent infection.      PHYSICAL EXAMINATION:   GENERAL:  She is alert and oriented x3.   VITAL SIGNS:  Reviewed.   EYES:  No icterus.   THROAT:  No mouth ulcer or thrush.   NECK:  Supple, no lymphadenopathy or thyromegaly.   AXILLAE:  No lymphadenopathy.   LUNGS:  Good air entry bilaterally.  No crackles or wheezing.   HEART:  Regular.   ABDOMEN:  Soft and nontender.  No mass.   EXTREMITIES:  No edema.  No calf swelling or tenderness.   SKIN:  No rash.      LABORATORY DATA:  Reviewed.      ASSESSMENT:   1.  An 84-year-old female with kappa free light chain multiple myeloma, standard-risk.   2.  Back pain from multiple myeloma and thoracic compression fracture.   3.  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      PLAN:   1.  I discussed with her regarding multiple myeloma.  I discussed the case with her hematologist at [facility], Dr. Neela Ramos.  Dr. Ramos mentioned that patient never had good response to Revlimid.  Also, she  was not able to tolerate Revlimid well because of fatigue and also cytopenia.  She recommends changing the treatment.        I discussed with the patient regarding different treatment options.  Discussed regarding Velcade along with dexamethasone.  Also discussed regarding combination of daratumumab with Velcade and dexamethasone.      I would prefer that we start her on Velcade with weekly dexamethasone.  Because patient has been cytopenia, I would recommend starting Velcade at 1 mg/m2 along with dexamethasone 20 mg once a week.  If she tolerates it very well, we will consider adding daratumumab.  The patient agreeable for it.      Side effects of Velcade including cytopenia and neuropathy were all discussed.  Pharmacy also met with the patient and discussed the side effects.     2.  The patient will continue on Zometa for bone health. She has been getting it every 3 weeks. She will also continue on calcium and vitamin D.   3.  I will see her in 2 weeks for followup.  I advised her to call us if she has fever, chills, infection, worsening weakness or any other concerns.  She is taking oxycodone for back pain which she will continue.      Total time spent 45 minutes, most of time was spent in counseling.         ITZ LOPEZ MD           D: 2017 16:13   T: 2017 21:04   MT:       Name:     ALBERTO PARSON   MRN:      [id number]        Account:      [id number]   :      1932           Visit Date:   2017      Document: [id number]

## 2017-03-24 ENCOUNTER — ANTICOAGULATION THERAPY VISIT (OUTPATIENT)
Dept: NURSING | Facility: CLINIC | Age: 82
End: 2017-03-24
Payer: COMMERCIAL

## 2017-03-24 ENCOUNTER — TELEPHONE (OUTPATIENT)
Dept: ONCOLOGY | Facility: CLINIC | Age: 82
End: 2017-03-24

## 2017-03-24 DIAGNOSIS — Z79.01 LONG-TERM (CURRENT) USE OF ANTICOAGULANTS: ICD-10-CM

## 2017-03-24 LAB — INR POINT OF CARE: 2.3 (ref 0.86–1.14)

## 2017-03-24 PROCEDURE — 99207 ZZC NO CHARGE NURSE ONLY: CPT

## 2017-03-24 PROCEDURE — 85610 PROTHROMBIN TIME: CPT | Mod: QW

## 2017-03-24 PROCEDURE — 36416 COLLJ CAPILLARY BLOOD SPEC: CPT

## 2017-03-24 NOTE — PROGRESS NOTES
ANTICOAGULATION FOLLOW-UP CLINIC VISIT    Patient Name:  Amira Arreola  Date:  3/24/2017  Contact Type:  Face to Face    SUBJECTIVE:     Patient Findings     Positives No Problem Findings           OBJECTIVE    INR Protime   Date Value Ref Range Status   03/24/2017 2.3 (A) 0.86 - 1.14 Final       ASSESSMENT / PLAN  INR assessment THER    Recheck INR In: 2 WEEKS    INR Location Clinic      Anticoagulation Summary as of 3/24/2017     INR goal 2.0-3.0   Today's INR 2.3   Maintenance plan 6 mg (4 mg x 1.5) on Mon, Wed, Fri; 4 mg (4 mg x 1) all other days   Full instructions 6 mg on Mon, Wed, Fri; 4 mg all other days   Weekly total 34 mg   No change documented Dayana Barrera RN   Plan last modified Dayana Barrera RN (3/10/2017)   Next INR check 4/7/2017   Target end date Indefinite    Indications   Long-term (current) use of anticoagulants [Z79.01] [Z79.01]  Atrial fibrillation (H) [I48.91] (Resolved) [I48.91]         Anticoagulation Episode Summary     INR check location     Preferred lab     Send INR reminders to [facility]    Comments PATIENT TAKES WARFARIN IN THE MORNING      Anticoagulation Care Providers     Provider Role Specialty Phone number    Addy Frias MD Responsible Internal Medicine [phone]            See the Encounter Report to view Anticoagulation Flowsheet and Dosing Calendar (Go to Encounters tab in chart review, and find the Anticoagulation Therapy Visit)    Dosage adjustment made based on physician directed care plan.    2.3  today.  Continue with 6 mg on Mon, Wed, Fri;  4 mg all other days.  Recheck in 2 weeks.     Dayana Barrera RN

## 2017-03-24 NOTE — TELEPHONE ENCOUNTER
Message left with the [facility] ([phone]) to let us know when Amira velez has zometa and when she is due next.  The nurse called back, last dose was 2/22/17, it is every 3 months.  Will route to Dr. Roberts

## 2017-03-24 NOTE — MR AVS SNAPSHOT
Amira Arreola   3/24/2017 9:45 AM   Anticoagulation Therapy Visit    Description:  84 year old female   Provider:   ANTICOAGULATION CLINIC   Department:  Ec Nurse           INR as of 3/24/2017     Today's INR 2.3      Anticoagulation Summary as of 3/24/2017     INR goal 2.0-3.0   Today's INR 2.3   Full instructions 6 mg on Mon, Wed, Fri; 4 mg all other days   Next INR check 4/7/2017    Indications   Long-term (current) use of anticoagulants [Z79.01] [Z79.01]  Atrial fibrillation (H) [I48.91] (Resolved) [I48.91]         Description     2.3  today.  Continue with 6 mg on Mon, Wed, Fri;  4 mg all other days.  Recheck in 2 weeks.         Your next Anticoagulation Clinic appointment(s)     Apr 07, 2017  9:45 AM CDT   Anticoagulation Visit with  ANTICOAGULATION CLINIC   [facility] ([facility]    [address] [place] [id number] [phone]              Contact Numbers     Clinic Number:         March 2017 Details    Sun Mon Tue Wed Thu Fri Sat        1               2               3               4                 5               6               7               8               9               10               11                 12               13               14               15               16               17               18                 19               20               21               22               23               24      6 mg   See details      25      4 mg           26      4 mg         27      6 mg         28      4 mg         29      6 mg         30      4 mg         31      6 mg           Date Details   03/24 This INR check               How to take your warfarin dose     To take:  4 mg Take 1 of the 4 mg tablets.    To take:  6 mg Take 1.5 of the 4 mg tablets.           April 2017 Details    Sun Mon Tue Wed Thu Fri Sat           1      4 mg           2      4 mg         3      6 mg         4      4 mg         5      6 mg          6      4 mg         7            8                 9               10               11               12               13               14               15                 16               17               18               19               20               21               22                 23               24               25               26               27               28               29                 30                      Date Details   No additional details    Date of next INR:  4/7/2017         How to take your warfarin dose     To take:  4 mg Take 1 of the 4 mg tablets.    To take:  6 mg Take 1.5 of the 4 mg tablets.

## 2017-03-28 ENCOUNTER — HOSPITAL ENCOUNTER (OUTPATIENT)
Facility: CLINIC | Age: 82
Setting detail: SPECIMEN
Discharge: HOME | End: 2017-03-28
Attending: INTERNAL MEDICINE | Admitting: INTERNAL MEDICINE
Payer: MEDICARE

## 2017-03-28 ENCOUNTER — INFUSION THERAPY VISIT (OUTPATIENT)
Dept: INFUSION THERAPY | Facility: CLINIC | Age: 82
End: 2017-03-28
Attending: INTERNAL MEDICINE
Payer: MEDICARE

## 2017-03-28 VITALS
WEIGHT: 127.8 LBS | RESPIRATION RATE: 16 BRPM | TEMPERATURE: 98.3 F | OXYGEN SATURATION: 98 % | SYSTOLIC BLOOD PRESSURE: 114 MMHG | HEIGHT: 66 IN | HEART RATE: 74 BPM | DIASTOLIC BLOOD PRESSURE: 68 MMHG | BODY MASS INDEX: 20.54 KG/M2

## 2017-03-28 DIAGNOSIS — C90.00 MULTIPLE MYELOMA NOT HAVING ACHIEVED REMISSION (H): Primary | ICD-10-CM

## 2017-03-28 DIAGNOSIS — C90.01 MULTIPLE MYELOMA IN REMISSION (H): ICD-10-CM

## 2017-03-28 LAB
BASOPHILS # BLD AUTO: 0 10E9/L (ref 0–0.2)
BASOPHILS NFR BLD AUTO: 0 %
CREAT SERPL-MCNC: 0.7 MG/DL (ref 0.52–1.04)
DIFFERENTIAL METHOD BLD: ABNORMAL
EOSINOPHIL # BLD AUTO: 0.1 10E9/L (ref 0–0.7)
EOSINOPHIL NFR BLD AUTO: 1.4 %
ERYTHROCYTE [DISTWIDTH] IN BLOOD BY AUTOMATED COUNT: 15 % (ref 10–15)
GFR SERPL CREATININE-BSD FRML MDRD: 79 ML/MIN/1.7M2
HCT VFR BLD AUTO: 35.3 % (ref 35–47)
HGB BLD-MCNC: 11.8 G/DL (ref 11.7–15.7)
IMM GRANULOCYTES # BLD: 0 10E9/L (ref 0–0.4)
IMM GRANULOCYTES NFR BLD: 0.3 %
LYMPHOCYTES # BLD AUTO: 1 10E9/L (ref 0.8–5.3)
LYMPHOCYTES NFR BLD AUTO: 28.3 %
MCH RBC QN AUTO: 33.3 PG (ref 26.5–33)
MCHC RBC AUTO-ENTMCNC: 33.4 G/DL (ref 31.5–36.5)
MCV RBC AUTO: 100 FL (ref 78–100)
MONOCYTES # BLD AUTO: 0.3 10E9/L (ref 0–1.3)
MONOCYTES NFR BLD AUTO: 7 %
NEUTROPHILS # BLD AUTO: 2.3 10E9/L (ref 1.6–8.3)
NEUTROPHILS NFR BLD AUTO: 63 %
NRBC # BLD AUTO: 0 10*3/UL
NRBC BLD AUTO-RTO: 0 /100
PLATELET # BLD AUTO: 128 10E9/L (ref 150–450)
RBC # BLD AUTO: 3.54 10E12/L (ref 3.8–5.2)
WBC # BLD AUTO: 3.6 10E9/L (ref 4–11)

## 2017-03-28 PROCEDURE — 96401 CHEMO ANTI-NEOPL SQ/IM: CPT

## 2017-03-28 PROCEDURE — 25000128 H RX IP 250 OP 636: Mod: JW | Performed by: INTERNAL MEDICINE

## 2017-03-28 PROCEDURE — 82565 ASSAY OF CREATININE: CPT

## 2017-03-28 PROCEDURE — 36415 COLL VENOUS BLD VENIPUNCTURE: CPT

## 2017-03-28 PROCEDURE — 85025 COMPLETE CBC W/AUTO DIFF WBC: CPT | Performed by: INTERNAL MEDICINE

## 2017-03-28 RX ADMIN — BORTEZOMIB 1.6 MG: 3.5 INJECTION, POWDER, LYOPHILIZED, FOR SOLUTION INTRAVENOUS; SUBCUTANEOUS at 10:39

## 2017-03-28 ASSESSMENT — PAIN SCALES - GENERAL: PAINLEVEL: NO PAIN (0)

## 2017-03-28 NOTE — MR AVS SNAPSHOT
After Visit Summary   3/28/2017    Amira Arreola    MRN: [id number]           Patient Information     Date Of Birth          7/17/1932        Visit Information        Provider Department      3/28/2017 9:30 AM  INFUSION CHAIR 9 [facility]        Today's Diagnoses     Multiple myeloma not having achieved remission (H)    -  1    Multiple myeloma in remission (H)           Follow-ups after your visit        Follow-up notes from your care team     Return in 7 days (on 4/4/2017).      Your next 10 appointments already scheduled     Apr 04, 2017  9:30 AM CDT   Level 2 with  INFUSION CHAIR 2   [facility] ([facility])    [facility]  [address]  [place] [address]   [phone]            Apr 04, 2017 10:00 AM CDT   Return Visit with Shayne Roberts MD   [facility] [place] ([facility])    [facility]  [address]  [facility] [address]   [phone]            Apr 07, 2017  9:45 AM CDT   Anticoagulation Visit with [facility]   [facility] [place] [id number] [phone]            Apr 11, 2017 11:00 AM CDT   Level 2 with  INFUSION CHAIR 11   [facility] [place] [facility] ([facility])    [facility]  [address]  [facility] [address]   [phone]              Who to contact     If you have questions or need follow up information about today's clinic visit or your schedule please contact [facility] directly at [phone].  Normal or non-critical lab and imaging results will be communicated to you by MyChart, letter or phone within 4 business days after the clinic has received the results. If you do not hear from us within 7 days,  "please contact the clinic through [email or web address] or phone. If you have a critical or abnormal lab result, we will notify you by phone as soon as possible.  Submit refill requests through [email or web address] or call your pharmacy and they will forward the refill request to us. Please allow 3 business days for your refill to be completed.          Additional Information About Your Visit        [employer] Information     [email or web address] lets you send messages to your doctor, view your test results, renew your prescriptions, schedule appointments and more. To sign up, go to [email or web address] . Click on \"Log in\" on the left side of the screen, which will take you to the Welcome page. Then click on \"Sign up Now\" on the right side of the page.     You will be asked to enter the access code listed below, as well as some personal information. Please follow the directions to create your username and password.     Your access code is: [email or web address]  Expires: 2017 11:09 AM     Your access code will  in 90 days. If you need help or a new code, please call your [place] clinic or [phone].        Care EveryWhere ID     This is your Care EveryWhere ID. This could be used by other organizations to access your [facility] medical records  [id number]        Your Vitals Were     Pulse Temperature Respirations Height Pulse Oximetry BMI (Body Mass Index)    74 98.3  F (36.8  C) (Oral) 16 1.676 m (5' 5.98\") 98% 20.64 kg/m2       Blood Pressure from Last 3 Encounters:   17 114/68   17 146/76   17 120/64    Weight from Last 3 Encounters:   17 58 kg (127 lb 12.8 oz)   17 58 kg (127 lb 13.9 oz)   17 58 kg (127 lb 12.8 oz)              We Performed the Following     CBC with platelets differential     Creatinine     Treatment Conditions        Primary Care Provider Office Phone # Fax #    Addy Frias [phone]       [facility] [address]  [place] [address]      "   Thank you!     Thank you for choosing [facility]  for your care. Our goal is always to provide you with excellent care. Hearing back from our patients is one way we can continue to improve our services. Please take a few minutes to complete the written survey that you may receive in the mail after your visit with us. Thank you!             Your Updated Medication List - Protect others around you: Learn how to safely use, store and throw away your medicines at www.disposemymeds.org.          This list is accurate as of: 3/28/17 11:09 AM.  Always use your most recent med list.                   Brand Name Dispense Instructions for use    acyclovir 400 MG tablet    ZOVIRAX    60 tablet    Take 1 tablet (400 mg) by mouth 2 times daily Viral Prophylaxis.       ASPIRIN NOT PRESCRIBED    INTENTIONAL    0 each    Antiplatelet medication not prescribed intentionally due to Current anticoagulant therapy (warfarin/enoxaparin)       * ATIVAN 0.5 MG tablet   Generic drug:  LORazepam     10 tablet    1 hour prior to MRI take 1 tablet (0.5 mg) and may repeat x's 1.       * LORazepam 0.5 MG tablet    ATIVAN    30 tablet    Take 1 tablet (0.5 mg) by mouth every 4 hours as needed (Anxiety, Nausea/Vomiting or Sleep)       CALCIUM CITRATE + PO      Take 2,000 mg by mouth 2 tabs       carboxymethylcellulose 0.5 % Soln ophthalmic solution    REFRESH PLUS     1 drop 4 times daily       CLARINEX PO      Taking claritin       * COMPAZINE PO      Take 10 mg by mouth       * prochlorperazine 10 MG tablet    COMPAZINE    30 tablet    Take 1 tablet (10 mg) by mouth every 6 hours as needed (Nausea/Vomiting)       cycloSPORINE 0.05 % ophthalmic emulsion    RESTASIS     Place 1 drop into both eyes every 12 hours       DAILY MULTIVITAMIN PO      Take 1 tablet by mouth daily.       * DEXAMETHASONE PO      Take 4 mg by mouth Reported on 3/21/2017       * dexamethasone 4 MG tablet    DECADRON    20 tablet    Take 5  tablets (20 mg) by mouth once a week       erythromycin ophthalmic ointment    ROMYCIN     Place 1 Application into both eyes At Bedtime       GENTLE STOOL SOFTENER PO      Take 100 mg by mouth daily       metoprolol 50 MG 24 hr tablet    TOPROL-XL    180 tablet    Take 1 tablet (50 mg) by mouth 2 times daily       OXYCODONE HCL PO      Take by mouth as needed       polyethylene glycol powder    MIRALAX/GLYCOLAX     Take 1 capful by mouth daily       timolol 0.25 % ophthalmic solution    TIMOPTIC     1 drop every morning       TYLENOL PO      Take 500 mg by mouth every 6 hours as needed for mild pain or fever       UNABLE TO FIND      MEDICATION NAME: Fresh Coat eye drops       VITAMIN D3 PO      Take 1,000 Units by mouth daily       warfarin 4 MG tablet    COUMADIN    90 tablet    Takes 4 mg on Tues, Sat,  6 mg all other days or as directed by the [facility].       ZOMETA IV      Inject into the vein every 30 days Every 3 month dosing       * Notice:  This list has 6 medication(s) that are the same as other medications prescribed for you. Read the directions carefully, and ask your doctor or other care provider to review them with you.

## 2017-04-03 RX ORDER — SODIUM CHLORIDE 9 MG/ML
1000 INJECTION, SOLUTION INTRAVENOUS CONTINUOUS PRN
Status: CANCELLED
Start: 2017-04-04

## 2017-04-03 RX ORDER — MEPERIDINE HYDROCHLORIDE 50 MG/ML
25 INJECTION INTRAMUSCULAR; INTRAVENOUS; SUBCUTANEOUS EVERY 30 MIN PRN
Status: CANCELLED | OUTPATIENT
Start: 2017-04-04

## 2017-04-03 RX ORDER — MEPERIDINE HYDROCHLORIDE 50 MG/ML
25 INJECTION INTRAMUSCULAR; INTRAVENOUS; SUBCUTANEOUS EVERY 30 MIN PRN
Status: CANCELLED | OUTPATIENT
Start: 2017-04-11

## 2017-04-03 RX ORDER — LORAZEPAM 2 MG/ML
0.5 INJECTION INTRAMUSCULAR EVERY 4 HOURS PRN
Status: CANCELLED
Start: 2017-04-11

## 2017-04-03 RX ORDER — EPINEPHRINE 0.3 MG/.3ML
0.3 INJECTION SUBCUTANEOUS EVERY 5 MIN PRN
Status: CANCELLED | OUTPATIENT
Start: 2017-04-11

## 2017-04-03 RX ORDER — LORAZEPAM 2 MG/ML
0.5 INJECTION INTRAMUSCULAR EVERY 4 HOURS PRN
Status: CANCELLED
Start: 2017-04-04

## 2017-04-03 RX ORDER — EPINEPHRINE 0.3 MG/.3ML
0.3 INJECTION SUBCUTANEOUS EVERY 5 MIN PRN
Status: CANCELLED | OUTPATIENT
Start: 2017-04-04

## 2017-04-03 RX ORDER — ALBUTEROL SULFATE 0.83 MG/ML
2.5 SOLUTION RESPIRATORY (INHALATION)
Status: CANCELLED | OUTPATIENT
Start: 2017-04-04

## 2017-04-03 RX ORDER — ALBUTEROL SULFATE 90 UG/1
1-2 AEROSOL, METERED RESPIRATORY (INHALATION)
Status: CANCELLED
Start: 2017-04-04

## 2017-04-03 RX ORDER — DIPHENHYDRAMINE HYDROCHLORIDE 50 MG/ML
50 INJECTION INTRAMUSCULAR; INTRAVENOUS
Status: CANCELLED
Start: 2017-04-04

## 2017-04-03 RX ORDER — METHYLPREDNISOLONE SODIUM SUCCINATE 125 MG/2ML
125 INJECTION, POWDER, LYOPHILIZED, FOR SOLUTION INTRAMUSCULAR; INTRAVENOUS
Status: CANCELLED
Start: 2017-04-04

## 2017-04-03 RX ORDER — ALBUTEROL SULFATE 0.83 MG/ML
2.5 SOLUTION RESPIRATORY (INHALATION)
Status: CANCELLED | OUTPATIENT
Start: 2017-04-11

## 2017-04-03 RX ORDER — SODIUM CHLORIDE 9 MG/ML
1000 INJECTION, SOLUTION INTRAVENOUS CONTINUOUS PRN
Status: CANCELLED
Start: 2017-04-11

## 2017-04-03 RX ORDER — EPINEPHRINE 1 MG/ML
0.3 INJECTION INTRAMUSCULAR; INTRAVENOUS; SUBCUTANEOUS EVERY 5 MIN PRN
Status: CANCELLED | OUTPATIENT
Start: 2017-04-04

## 2017-04-03 RX ORDER — METHYLPREDNISOLONE SODIUM SUCCINATE 125 MG/2ML
125 INJECTION, POWDER, LYOPHILIZED, FOR SOLUTION INTRAMUSCULAR; INTRAVENOUS
Status: CANCELLED
Start: 2017-04-11

## 2017-04-03 RX ORDER — DIPHENHYDRAMINE HYDROCHLORIDE 50 MG/ML
50 INJECTION INTRAMUSCULAR; INTRAVENOUS
Status: CANCELLED
Start: 2017-04-11

## 2017-04-03 RX ORDER — ALBUTEROL SULFATE 90 UG/1
1-2 AEROSOL, METERED RESPIRATORY (INHALATION)
Status: CANCELLED
Start: 2017-04-11

## 2017-04-03 RX ORDER — EPINEPHRINE 1 MG/ML
0.3 INJECTION INTRAMUSCULAR; INTRAVENOUS; SUBCUTANEOUS EVERY 5 MIN PRN
Status: CANCELLED | OUTPATIENT
Start: 2017-04-11

## 2017-04-04 ENCOUNTER — HOSPITAL ENCOUNTER (OUTPATIENT)
Facility: CLINIC | Age: 82
Setting detail: SPECIMEN
Discharge: HOME | End: 2017-04-04
Admitting: INTERNAL MEDICINE
Payer: MEDICARE

## 2017-04-04 ENCOUNTER — INFUSION THERAPY VISIT (OUTPATIENT)
Dept: INFUSION THERAPY | Facility: CLINIC | Age: 82
End: 2017-04-04
Attending: INTERNAL MEDICINE
Payer: MEDICARE

## 2017-04-04 ENCOUNTER — ONCOLOGY VISIT (OUTPATIENT)
Dept: ONCOLOGY | Facility: CLINIC | Age: 82
End: 2017-04-04
Attending: INTERNAL MEDICINE
Payer: MEDICARE

## 2017-04-04 VITALS
WEIGHT: 130.4 LBS | BODY MASS INDEX: 20.96 KG/M2 | TEMPERATURE: 97.6 F | RESPIRATION RATE: 16 BRPM | SYSTOLIC BLOOD PRESSURE: 120 MMHG | DIASTOLIC BLOOD PRESSURE: 58 MMHG | HEART RATE: 78 BPM | HEIGHT: 66 IN | OXYGEN SATURATION: 100 %

## 2017-04-04 VITALS
DIASTOLIC BLOOD PRESSURE: 58 MMHG | HEART RATE: 78 BPM | RESPIRATION RATE: 16 BRPM | TEMPERATURE: 97.6 F | HEIGHT: 66 IN | SYSTOLIC BLOOD PRESSURE: 120 MMHG | WEIGHT: 130.4 LBS | BODY MASS INDEX: 20.96 KG/M2 | OXYGEN SATURATION: 100 %

## 2017-04-04 DIAGNOSIS — C90.01 MULTIPLE MYELOMA IN REMISSION (H): ICD-10-CM

## 2017-04-04 DIAGNOSIS — C90.00 MULTIPLE MYELOMA NOT HAVING ACHIEVED REMISSION (H): Primary | ICD-10-CM

## 2017-04-04 LAB
BASOPHILS # BLD AUTO: 0 10E9/L (ref 0–0.2)
BASOPHILS NFR BLD AUTO: 0 %
CREAT SERPL-MCNC: 0.66 MG/DL (ref 0.52–1.04)
DIFFERENTIAL METHOD BLD: ABNORMAL
EOSINOPHIL # BLD AUTO: 0 10E9/L (ref 0–0.7)
EOSINOPHIL NFR BLD AUTO: 0.6 %
ERYTHROCYTE [DISTWIDTH] IN BLOOD BY AUTOMATED COUNT: 15.3 % (ref 10–15)
GFR SERPL CREATININE-BSD FRML MDRD: 85 ML/MIN/1.7M2
HCT VFR BLD AUTO: 35.1 % (ref 35–47)
HGB BLD-MCNC: 11.5 G/DL (ref 11.7–15.7)
IMM GRANULOCYTES # BLD: 0 10E9/L (ref 0–0.4)
IMM GRANULOCYTES NFR BLD: 0.2 %
LYMPHOCYTES # BLD AUTO: 1.2 10E9/L (ref 0.8–5.3)
LYMPHOCYTES NFR BLD AUTO: 26.1 %
MCH RBC QN AUTO: 33 PG (ref 26.5–33)
MCHC RBC AUTO-ENTMCNC: 32.8 G/DL (ref 31.5–36.5)
MCV RBC AUTO: 101 FL (ref 78–100)
MONOCYTES # BLD AUTO: 0.4 10E9/L (ref 0–1.3)
MONOCYTES NFR BLD AUTO: 8.8 %
NEUTROPHILS # BLD AUTO: 3 10E9/L (ref 1.6–8.3)
NEUTROPHILS NFR BLD AUTO: 64.3 %
NRBC # BLD AUTO: 0 10*3/UL
NRBC BLD AUTO-RTO: 0 /100
PLATELET # BLD AUTO: 131 10E9/L (ref 150–450)
RBC # BLD AUTO: 3.48 10E12/L (ref 3.8–5.2)
WBC # BLD AUTO: 4.6 10E9/L (ref 4–11)

## 2017-04-04 PROCEDURE — 36415 COLL VENOUS BLD VENIPUNCTURE: CPT

## 2017-04-04 PROCEDURE — 99214 OFFICE O/P EST MOD 30 MIN: CPT | Performed by: INTERNAL MEDICINE

## 2017-04-04 PROCEDURE — 82565 ASSAY OF CREATININE: CPT

## 2017-04-04 PROCEDURE — 85025 COMPLETE CBC W/AUTO DIFF WBC: CPT | Performed by: INTERNAL MEDICINE

## 2017-04-04 PROCEDURE — 25000128 H RX IP 250 OP 636: Mod: JW | Performed by: INTERNAL MEDICINE

## 2017-04-04 PROCEDURE — 96401 CHEMO ANTI-NEOPL SQ/IM: CPT

## 2017-04-04 RX ORDER — LORAZEPAM 2 MG/ML
0.5 INJECTION INTRAMUSCULAR EVERY 4 HOURS PRN
Status: CANCELLED
Start: 2017-04-18

## 2017-04-04 RX ORDER — ALBUTEROL SULFATE 90 UG/1
1-2 AEROSOL, METERED RESPIRATORY (INHALATION)
Status: CANCELLED
Start: 2017-04-18

## 2017-04-04 RX ORDER — METHYLPREDNISOLONE SODIUM SUCCINATE 125 MG/2ML
125 INJECTION, POWDER, LYOPHILIZED, FOR SOLUTION INTRAMUSCULAR; INTRAVENOUS
Status: CANCELLED
Start: 2017-05-09

## 2017-04-04 RX ORDER — MEPERIDINE HYDROCHLORIDE 50 MG/ML
25 INJECTION INTRAMUSCULAR; INTRAVENOUS; SUBCUTANEOUS EVERY 30 MIN PRN
Status: CANCELLED | OUTPATIENT
Start: 2017-05-09

## 2017-04-04 RX ORDER — EPINEPHRINE 1 MG/ML
0.3 INJECTION INTRAMUSCULAR; INTRAVENOUS; SUBCUTANEOUS EVERY 5 MIN PRN
Status: CANCELLED | OUTPATIENT
Start: 2017-04-18

## 2017-04-04 RX ORDER — EPINEPHRINE 1 MG/ML
0.3 INJECTION INTRAMUSCULAR; INTRAVENOUS; SUBCUTANEOUS EVERY 5 MIN PRN
Status: CANCELLED | OUTPATIENT
Start: 2017-05-09

## 2017-04-04 RX ORDER — ALBUTEROL SULFATE 90 UG/1
1-2 AEROSOL, METERED RESPIRATORY (INHALATION)
Status: CANCELLED
Start: 2017-05-02

## 2017-04-04 RX ORDER — ALBUTEROL SULFATE 0.83 MG/ML
2.5 SOLUTION RESPIRATORY (INHALATION)
Status: CANCELLED | OUTPATIENT
Start: 2017-05-09

## 2017-04-04 RX ORDER — ALBUTEROL SULFATE 90 UG/1
1-2 AEROSOL, METERED RESPIRATORY (INHALATION)
Status: CANCELLED
Start: 2017-04-25

## 2017-04-04 RX ORDER — DIPHENHYDRAMINE HYDROCHLORIDE 50 MG/ML
50 INJECTION INTRAMUSCULAR; INTRAVENOUS
Status: CANCELLED
Start: 2017-05-02

## 2017-04-04 RX ORDER — ALBUTEROL SULFATE 0.83 MG/ML
2.5 SOLUTION RESPIRATORY (INHALATION)
Status: CANCELLED | OUTPATIENT
Start: 2017-04-18

## 2017-04-04 RX ORDER — DIPHENHYDRAMINE HYDROCHLORIDE 50 MG/ML
50 INJECTION INTRAMUSCULAR; INTRAVENOUS
Status: CANCELLED
Start: 2017-05-09

## 2017-04-04 RX ORDER — EPINEPHRINE 0.3 MG/.3ML
0.3 INJECTION SUBCUTANEOUS EVERY 5 MIN PRN
Status: CANCELLED | OUTPATIENT
Start: 2017-05-09

## 2017-04-04 RX ORDER — DIPHENHYDRAMINE HYDROCHLORIDE 50 MG/ML
50 INJECTION INTRAMUSCULAR; INTRAVENOUS
Status: CANCELLED
Start: 2017-04-18

## 2017-04-04 RX ORDER — METHYLPREDNISOLONE SODIUM SUCCINATE 125 MG/2ML
125 INJECTION, POWDER, LYOPHILIZED, FOR SOLUTION INTRAMUSCULAR; INTRAVENOUS
Status: CANCELLED
Start: 2017-05-02

## 2017-04-04 RX ORDER — METHYLPREDNISOLONE SODIUM SUCCINATE 125 MG/2ML
125 INJECTION, POWDER, LYOPHILIZED, FOR SOLUTION INTRAMUSCULAR; INTRAVENOUS
Status: CANCELLED
Start: 2017-04-18

## 2017-04-04 RX ORDER — EPINEPHRINE 0.3 MG/.3ML
0.3 INJECTION SUBCUTANEOUS EVERY 5 MIN PRN
Status: CANCELLED | OUTPATIENT
Start: 2017-04-18

## 2017-04-04 RX ORDER — DEXAMETHASONE 4 MG/1
20 TABLET ORAL WEEKLY
Qty: 20 TABLET | Refills: 0 | Status: SHIPPED | OUTPATIENT
Start: 2017-04-04 | End: 2017-04-11

## 2017-04-04 RX ORDER — DIPHENHYDRAMINE HYDROCHLORIDE 50 MG/ML
50 INJECTION INTRAMUSCULAR; INTRAVENOUS
Status: CANCELLED
Start: 2017-04-25

## 2017-04-04 RX ORDER — ALBUTEROL SULFATE 90 UG/1
1-2 AEROSOL, METERED RESPIRATORY (INHALATION)
Status: CANCELLED
Start: 2017-05-09

## 2017-04-04 RX ORDER — ALBUTEROL SULFATE 0.83 MG/ML
2.5 SOLUTION RESPIRATORY (INHALATION)
Status: CANCELLED | OUTPATIENT
Start: 2017-04-25

## 2017-04-04 RX ORDER — METHYLPREDNISOLONE SODIUM SUCCINATE 125 MG/2ML
125 INJECTION, POWDER, LYOPHILIZED, FOR SOLUTION INTRAMUSCULAR; INTRAVENOUS
Status: CANCELLED
Start: 2017-04-25

## 2017-04-04 RX ORDER — MEPERIDINE HYDROCHLORIDE 50 MG/ML
25 INJECTION INTRAMUSCULAR; INTRAVENOUS; SUBCUTANEOUS EVERY 30 MIN PRN
Status: CANCELLED | OUTPATIENT
Start: 2017-05-02

## 2017-04-04 RX ORDER — MEPERIDINE HYDROCHLORIDE 50 MG/ML
25 INJECTION INTRAMUSCULAR; INTRAVENOUS; SUBCUTANEOUS EVERY 30 MIN PRN
Status: CANCELLED | OUTPATIENT
Start: 2017-04-18

## 2017-04-04 RX ORDER — SODIUM CHLORIDE 9 MG/ML
1000 INJECTION, SOLUTION INTRAVENOUS CONTINUOUS PRN
Status: CANCELLED
Start: 2017-05-02

## 2017-04-04 RX ORDER — LORAZEPAM 2 MG/ML
0.5 INJECTION INTRAMUSCULAR EVERY 4 HOURS PRN
Status: CANCELLED
Start: 2017-05-09

## 2017-04-04 RX ORDER — SODIUM CHLORIDE 9 MG/ML
1000 INJECTION, SOLUTION INTRAVENOUS CONTINUOUS PRN
Status: CANCELLED
Start: 2017-05-09

## 2017-04-04 RX ORDER — SODIUM CHLORIDE 9 MG/ML
1000 INJECTION, SOLUTION INTRAVENOUS CONTINUOUS PRN
Status: CANCELLED
Start: 2017-04-18

## 2017-04-04 RX ORDER — MEPERIDINE HYDROCHLORIDE 50 MG/ML
25 INJECTION INTRAMUSCULAR; INTRAVENOUS; SUBCUTANEOUS EVERY 30 MIN PRN
Status: CANCELLED | OUTPATIENT
Start: 2017-04-25

## 2017-04-04 RX ORDER — LORAZEPAM 2 MG/ML
0.5 INJECTION INTRAMUSCULAR EVERY 4 HOURS PRN
Status: CANCELLED
Start: 2017-05-02

## 2017-04-04 RX ORDER — EPINEPHRINE 1 MG/ML
0.3 INJECTION INTRAMUSCULAR; INTRAVENOUS; SUBCUTANEOUS EVERY 5 MIN PRN
Status: CANCELLED | OUTPATIENT
Start: 2017-05-02

## 2017-04-04 RX ORDER — ALBUTEROL SULFATE 0.83 MG/ML
2.5 SOLUTION RESPIRATORY (INHALATION)
Status: CANCELLED | OUTPATIENT
Start: 2017-05-02

## 2017-04-04 RX ORDER — EPINEPHRINE 1 MG/ML
0.3 INJECTION INTRAMUSCULAR; INTRAVENOUS; SUBCUTANEOUS EVERY 5 MIN PRN
Status: CANCELLED | OUTPATIENT
Start: 2017-04-25

## 2017-04-04 RX ORDER — LORAZEPAM 2 MG/ML
0.5 INJECTION INTRAMUSCULAR EVERY 4 HOURS PRN
Status: CANCELLED
Start: 2017-04-25

## 2017-04-04 RX ORDER — EPINEPHRINE 0.3 MG/.3ML
0.3 INJECTION SUBCUTANEOUS EVERY 5 MIN PRN
Status: CANCELLED | OUTPATIENT
Start: 2017-04-25

## 2017-04-04 RX ORDER — SODIUM CHLORIDE 9 MG/ML
1000 INJECTION, SOLUTION INTRAVENOUS CONTINUOUS PRN
Status: CANCELLED
Start: 2017-04-25

## 2017-04-04 RX ORDER — EPINEPHRINE 0.3 MG/.3ML
0.3 INJECTION SUBCUTANEOUS EVERY 5 MIN PRN
Status: CANCELLED | OUTPATIENT
Start: 2017-05-02

## 2017-04-04 RX ADMIN — BORTEZOMIB 2.1 MG: 3.5 INJECTION, POWDER, LYOPHILIZED, FOR SOLUTION INTRAVENOUS; SUBCUTANEOUS at 11:08

## 2017-04-04 ASSESSMENT — PAIN SCALES - GENERAL: PAINLEVEL: NO PAIN (0)

## 2017-04-04 NOTE — MR AVS SNAPSHOT
After Visit Summary   4/4/2017    Amira Arreola    MRN: [id number]           Patient Information     Date Of Birth          7/17/1932        Visit Information        Provider Department      4/4/2017 9:30 AM SH INFUSION CHAIR 2 [facility]        Today's Diagnoses     Multiple myeloma not having achieved remission (H)    -  1    Multiple myeloma in remission (H)           Follow-ups after your visit        Follow-up notes from your care team     Return in 7 days (on 4/11/2017).      Your next 10 appointments already scheduled     Apr 07, 2017  9:45 AM CDT   Anticoagulation Visit with EC ANTICOAGULATION CLINIC   [facility]    [address] [place] [address]   [phone]            Apr 11, 2017 11:00 AM CDT   Level 2 with SH INFUSION CHAIR 11   [facility] ([facility])    [facility]  [address]  [place] [address]   [phone]            Apr 18, 2017 11:00 AM CDT   Level 2 with SH INFUSION CHAIR 8   [facility] ([facility])    [facility] [place]  [address]  [place] [address]   [phone]            Apr 25, 2017  1:00 PM CDT   Level 2 with SH INFUSION CHAIR 17   [facility] ([facility])    [facility] [place]  [address]  [place] [address]   [phone]            Apr 25, 2017  2:00 PM CDT   Return Visit with Shayne Roberts MD   [facility] ([facility])    [facility] [place]  [address]  [facility] [address]   [phone]              Who to contact     If you have questions or need follow up information about today's clinic visit or your schedule please contact [facility] AND INFUSION  "CENTER directly at [phone].  Normal or non-critical lab and imaging results will be communicated to you by MyChart, letter or phone within 4 business days after the clinic has received the results. If you do not hear from us within 7 days, please contact the clinic through App DreamWorkshart or phone. If you have a critical or abnormal lab result, we will notify you by phone as soon as possible.  Submit refill requests through Momox or call your pharmacy and they will forward the refill request to us. Please allow 3 business days for your refill to be completed.          Additional Information About Your Visit        App DreamWorksharArgyle Data Information     Momox lets you send messages to your doctor, view your test results, renew your prescriptions, schedule appointments and more. To sign up, go to [email or web address] . Click on \"Log in\" on the left side of the screen, which will take you to the Welcome page. Then click on \"Sign up Now\" on the right side of the page.     You will be asked to enter the access code listed below, as well as some personal information. Please follow the directions to create your username and password.     Your access code is: [email or web address]  Expires: 2017 11:09 AM     Your access code will  in 90 days. If you need help or a new code, please call your [place] clinic or [phone].        Care EveryWhere ID     This is your Care EveryWhere ID. This could be used by other organizations to access your [place] medical records  [id number]        Your Vitals Were     Pulse Temperature Respirations Height Pulse Oximetry BMI (Body Mass Index)    78 97.6  F (36.4  C) (Oral) 16 1.676 m (5' 5.98\") 100% 21.06 kg/m2       Blood Pressure from Last 3 Encounters:   17 120/58   17 120/58   17 114/68    Weight from Last 3 Encounters:   17 59.1 kg (130 lb 6.4 oz)   17 59.1 kg (130 lb 6.4 oz)   17 58 kg (127 lb 12.8 oz)              We Performed the Following     CBC with " platelets differential     Creatinine          Today's Medication Changes          These changes are accurate as of: 4/4/17  1:22 PM.  If you have any questions, ask your nurse or doctor.               These medicines have changed or have updated prescriptions.        Dose/Directions    * DEXAMETHASONE PO   This may have changed:  Another medication with the same name was added. Make sure you understand how and when to take each.   Changed by:  Addy Frias MD        Dose:  4 mg   Take 4 mg by mouth Reported on 3/21/2017   Refills:  0       * dexamethasone 4 MG tablet   Commonly known as:  DECADRON   This may have changed:  Another medication with the same name was added. Make sure you understand how and when to take each.   Used for:  Multiple myeloma not having achieved remission (H)        Dose:  20 mg   Take 5 tablets (20 mg) by mouth once a week   Quantity:  20 tablet   Refills:  0       * dexamethasone 4 MG tablet   Commonly known as:  DECADRON   This may have changed:  You were already taking a medication with the same name, and this prescription was added. Make sure you understand how and when to take each.   Used for:  Multiple myeloma not having achieved remission (H)        Dose:  20 mg   Take 5 tablets (20 mg) by mouth once a week   Quantity:  20 tablet   Refills:  0       * Notice:  This list has 3 medication(s) that are the same as other medications prescribed for you. Read the directions carefully, and ask your doctor or other care provider to review them with you.         Where to get your medicines      These medications were sent to [id number] [place] [facility] [address] AT [facility] [address]  [address], [facility] [address]     Phone:  [phone]     dexamethasone 4 MG tablet                Primary Care Provider Office Phone # Fax #    Addy Frias [phone]       [facility] [address]  [place]  [address]        Thank you!     Thank you for choosing [facility]  for your care. Our goal is always to provide you with excellent care. Hearing back from our patients is one way we can continue to improve our services. Please take a few minutes to complete the written survey that you may receive in the mail after your visit with us. Thank you!             Your Updated Medication List - Protect others around you: Learn how to safely use, store and throw away your medicines at www.disposemymeds.org.          This list is accurate as of: 4/4/17  1:22 PM.  Always use your most recent med list.                   Brand Name Dispense Instructions for use    acyclovir 400 MG tablet    ZOVIRAX    60 tablet    Take 1 tablet (400 mg) by mouth 2 times daily Viral Prophylaxis.       ASPIRIN NOT PRESCRIBED    INTENTIONAL    0 each    Antiplatelet medication not prescribed intentionally due to Current anticoagulant therapy (warfarin/enoxaparin)       * ATIVAN 0.5 MG tablet   Generic drug:  LORazepam     10 tablet    1 hour prior to MRI take 1 tablet (0.5 mg) and may repeat x's 1.       * LORazepam 0.5 MG tablet    ATIVAN    30 tablet    Take 1 tablet (0.5 mg) by mouth every 4 hours as needed (Anxiety, Nausea/Vomiting or Sleep)       CALCIUM CITRATE + PO      Take 2,000 mg by mouth 2 tabs       carboxymethylcellulose 0.5 % Soln ophthalmic solution    REFRESH PLUS     1 drop 4 times daily       CLARINEX PO      Taking claritin       * COMPAZINE PO      Take 10 mg by mouth       * prochlorperazine 10 MG tablet    COMPAZINE    30 tablet    Take 1 tablet (10 mg) by mouth every 6 hours as needed (Nausea/Vomiting)       cycloSPORINE 0.05 % ophthalmic emulsion    RESTASIS     Place 1 drop into both eyes every 12 hours       DAILY MULTIVITAMIN PO      Take 1 tablet by mouth daily.       * DEXAMETHASONE PO      Take 4 mg by mouth Reported on 3/21/2017       * dexamethasone 4 MG tablet    DECADRON    20 tablet     Take 5 tablets (20 mg) by mouth once a week       * dexamethasone 4 MG tablet    DECADRON    20 tablet    Take 5 tablets (20 mg) by mouth once a week       erythromycin ophthalmic ointment    ROMYCIN     Place 1 Application into both eyes At Bedtime       GENTLE STOOL SOFTENER PO      Take 100 mg by mouth daily       metoprolol 50 MG 24 hr tablet    TOPROL-XL    180 tablet    Take 1 tablet (50 mg) by mouth 2 times daily       OXYCODONE HCL PO      Take by mouth as needed       polyethylene glycol powder    MIRALAX/GLYCOLAX     Take 1 capful by mouth daily       timolol 0.25 % ophthalmic solution    TIMOPTIC     1 drop every morning       TYLENOL PO      Take 500 mg by mouth every 6 hours as needed for mild pain or fever       UNABLE TO FIND      MEDICATION NAME: Fresh Coat eye drops       VITAMIN D3 PO      Take 1,000 Units by mouth daily       warfarin 4 MG tablet    COUMADIN    90 tablet    Takes 4 mg on Tues, Sat,  6 mg all other days or as directed by the ACC.       ZOMETA IV      Inject into the vein every 30 days Every 3 month dosing       * Notice:  This list has 7 medication(s) that are the same as other medications prescribed for you. Read the directions carefully, and ask your doctor or other care provider to review them with you.

## 2017-04-04 NOTE — PROGRESS NOTES
"Amira Arreola is a 84 year old female who presents for:  Chief Complaint   Patient presents with     Oncology Clinic Visit     MGUS        Initial Vitals:  /58  Pulse 78  Temp 97.6  F (36.4  C) (Oral)  Resp 16  Ht 1.676 m (5' 5.98\")  Wt 59.1 kg (130 lb 6.4 oz)  SpO2 100%  BMI 21.06 kg/m2 Estimated body mass index is 21.06 kg/(m^2) as calculated from the following:    Height as of this encounter: 1.676 m (5' 5.98\").    Weight as of this encounter: 59.1 kg (130 lb 6.4 oz).. Body surface area is 1.66 meters squared. BP completed using cuff size: regular  No Pain (0) No LMP recorded. Patient is postmenopausal. Allergies and medications reviewed.     Medications: Medication refills not needed today.  Pharmacy name entered into Dianxin:    [id number] - [place], [place] - [address] [id number] [address] [facility] [address]  [facility] [id number] - [place], [place] - [address] AT [facility] OF  [address]    Comments: Follow-up MGUS     3 minutes for nursing intake (face to face time)   Angélica Jones MA      DISCHARGE PLAN:      1.) Continue Velcade/ Reported to SHELLIE Kauffman/ INfusion     Will up dose to 1.3mg/m2- the frequency will not change  2.) Schedule given to Dorita to arrange : 4/11/17 already arranged  4/18/17: labs and Velcade  4/25/17: MD visit with labs and velcade    Next appointments: See patient instruction section  Departure Mode: Ambulatory  Accompanied by:   6 minutes for nursing discharge (face to face time)   Tameka Daigle RN      "

## 2017-04-04 NOTE — MR AVS SNAPSHOT
After Visit Summary   4/4/2017    Amira Arreola    MRN: [id number]           Patient Information     Date Of Birth          7/17/1932        Visit Information        Provider Department      4/4/2017 10:00 AM Shayne Roberts MD [facility]        Care Instructions    Continue velcade. Increase the dose to 1.3 mg/m2.  FU in 3 weeks.        Follow-ups after your visit        Your next 10 appointments already scheduled     Apr 07, 2017  9:45 AM CDT   Anticoagulation Visit with EC ANTICOAGULATION CLINIC   [facility] ([address] [address]   [phone]            Apr 11, 2017 11:00 AM CDT   Level 2 with SH INFUSION CHAIR 11   [facility] ([facility])    [facility]  [address]  [place] [address]   [phone]            Apr 18, 2017 11:00 AM CDT   Level 2 with SH INFUSION CHAIR 8   [facility] ([facility])    [facility]  [address]  [place] [address]   [phone]            Apr 25, 2017  1:00 PM CDT   Level 2 with SH INFUSION CHAIR 17   [facility] ([facility])    [facility]  [address]  [place] [address]   [phone]            Apr 25, 2017  2:00 PM CDT   Return Visit with Shayne Roberts MD   [facility] ([facility])    [facility]  [address]  [place] [address]   [phone]              Who to contact     If you have questions or need follow up information about today's clinic visit or your schedule please contact [facility] directly at [phone].  Normal or non-critical lab and imaging results will be communicated to you by MyChart, letter or phone within 4  "business days after the clinic has received the results. If you do not hear from us within 7 days, please contact the clinic through InTouch [employer] or phone. If you have a critical or abnormal lab result, we will notify you by phone as soon as possible.  Submit refill requests through InTouch Technology or call your pharmacy and they will forward the refill request to us. Please allow 3 business days for your refill to be completed.          Additional Information About Your Visit        InTouch Technology Information     InTouch Technology lets you send messages to your doctor, view your test results, renew your prescriptions, schedule appointments and more. To sign up, go to [email or web address] . Click on \"Log in\" on the left side of the screen, which will take you to the Welcome page. Then click on \"Sign up Now\" on the right side of the page.     You will be asked to enter the access code listed below, as well as some personal information. Please follow the directions to create your username and password.     Your access code is: [email or web address]  Expires: 2017 11:09 AM     Your access code will  in 90 days. If you need help or a new code, please call your [place] clinic or [phone].        Care EveryWhere ID     This is your Care EveryWhere ID. This could be used by other organizations to access your [place] medical records  [id number]        Your Vitals Were     Pulse Temperature Respirations Height Pulse Oximetry BMI (Body Mass Index)    78 97.6  F (36.4  C) (Oral) 16 1.676 m (5' 5.98\") 100% 21.06 kg/m2       Blood Pressure from Last 3 Encounters:   17 120/58   17 120/58   17 114/68    Weight from Last 3 Encounters:   17 59.1 kg (130 lb 6.4 oz)   17 59.1 kg (130 lb 6.4 oz)   17 58 kg (127 lb 12.8 oz)              Today, you had the following     No orders found for display         Today's Medication Changes          These changes are accurate as of: 17 11:00 AM.  If you have any questions, " ask your nurse or doctor.               These medicines have changed or have updated prescriptions.        Dose/Directions    * DEXAMETHASONE PO   This may have changed:  Another medication with the same name was added. Make sure you understand how and when to take each.   Changed by:  Adyd Frias MD        Dose:  4 mg   Take 4 mg by mouth Reported on 3/21/2017   Refills:  0       * dexamethasone 4 MG tablet   Commonly known as:  DECADRON   This may have changed:  Another medication with the same name was added. Make sure you understand how and when to take each.   Used for:  Multiple myeloma not having achieved remission (H)        Dose:  20 mg   Take 5 tablets (20 mg) by mouth once a week   Quantity:  20 tablet   Refills:  0       * dexamethasone 4 MG tablet   Commonly known as:  DECADRON   This may have changed:  You were already taking a medication with the same name, and this prescription was added. Make sure you understand how and when to take each.   Used for:  Multiple myeloma not having achieved remission (H)        Dose:  20 mg   Take 5 tablets (20 mg) by mouth once a week   Quantity:  20 tablet   Refills:  0       * Notice:  This list has 3 medication(s) that are the same as other medications prescribed for you. Read the directions carefully, and ask your doctor or other care provider to review them with you.         Where to get your medicines      These medications were sent to [facility] [address] [facility] [address] AT [facility] [address]  [address], [place] [address]     Phone:  [phone]     dexamethasone 4 MG tablet                Primary Care Provider Office Phone # Fax #    Addy Frias [phone]       [facility] [address] [facility] [address]  [facility] [address]        Thank you!     Thank you for choosing [facility]  for your care. Our goal is always to provide you with excellent care. Hearing back from  our patients is one way we can continue to improve our services. Please take a few minutes to complete the written survey that you may receive in the mail after your visit with us. Thank you!             Your Updated Medication List - Protect others around you: Learn how to safely use, store and throw away your medicines at www.disposemymeds.org.          This list is accurate as of: 4/4/17 11:00 AM.  Always use your most recent med list.                   Brand Name Dispense Instructions for use    acyclovir 400 MG tablet    ZOVIRAX    60 tablet    Take 1 tablet (400 mg) by mouth 2 times daily Viral Prophylaxis.       ASPIRIN NOT PRESCRIBED    INTENTIONAL    0 each    Antiplatelet medication not prescribed intentionally due to Current anticoagulant therapy (warfarin/enoxaparin)       * ATIVAN 0.5 MG tablet   Generic drug:  LORazepam     10 tablet    1 hour prior to MRI take 1 tablet (0.5 mg) and may repeat x's 1.       * LORazepam 0.5 MG tablet    ATIVAN    30 tablet    Take 1 tablet (0.5 mg) by mouth every 4 hours as needed (Anxiety, Nausea/Vomiting or Sleep)       CALCIUM CITRATE + PO      Take 2,000 mg by mouth 2 tabs       carboxymethylcellulose 0.5 % Soln ophthalmic solution    REFRESH PLUS     1 drop 4 times daily       CLARINEX PO      Taking claritin       * COMPAZINE PO      Take 10 mg by mouth       * prochlorperazine 10 MG tablet    COMPAZINE    30 tablet    Take 1 tablet (10 mg) by mouth every 6 hours as needed (Nausea/Vomiting)       cycloSPORINE 0.05 % ophthalmic emulsion    RESTASIS     Place 1 drop into both eyes every 12 hours       DAILY MULTIVITAMIN PO      Take 1 tablet by mouth daily.       * DEXAMETHASONE PO      Take 4 mg by mouth Reported on 3/21/2017       * dexamethasone 4 MG tablet    DECADRON    20 tablet    Take 5 tablets (20 mg) by mouth once a week       * dexamethasone 4 MG tablet    DECADRON    20 tablet    Take 5 tablets (20 mg) by mouth once a week       erythromycin ophthalmic  ointment    ROMYCIN     Place 1 Application into both eyes At Bedtime       GENTLE STOOL SOFTENER PO      Take 100 mg by mouth daily       metoprolol 50 MG 24 hr tablet    TOPROL-XL    180 tablet    Take 1 tablet (50 mg) by mouth 2 times daily       OXYCODONE HCL PO      Take by mouth as needed       polyethylene glycol powder    MIRALAX/GLYCOLAX     Take 1 capful by mouth daily       timolol 0.25 % ophthalmic solution    TIMOPTIC     1 drop every morning       TYLENOL PO      Take 500 mg by mouth every 6 hours as needed for mild pain or fever       UNABLE TO FIND      MEDICATION NAME: Fresh Coat eye drops       VITAMIN D3 PO      Take 1,000 Units by mouth daily       warfarin 4 MG tablet    COUMADIN    90 tablet    Takes 4 mg on Tues, Sat,  6 mg all other days or as directed by the ACC.       ZOMETA IV      Inject into the vein every 30 days Every 3 month dosing       * Notice:  This list has 7 medication(s) that are the same as other medications prescribed for you. Read the directions carefully, and ask your doctor or other care provider to review them with you.

## 2017-04-05 NOTE — PROGRESS NOTES
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  The patient was started on Velcade with dexamethasone on 2017.  We started Velcade at a dose of 1 mg/m2.  So far she is tolerating it well.  Overall, she is doing well.  She has no new complaints.  No headache.  No dizziness.  No neck pain.  No chest pain or difficulty breathing.  No abdominal pain, nausea or vomiting.  No urinary or bowel complaints.  No bleeding.  She has some back pain.  She is taking pain medication including Tylenol which helps.      PHYSICAL EXAMINATION:   GENERAL:  She was alert and oriented x3.   VITAL SIGNS:  Reviewed.   Rest of systems not examined.      LABORATORY DATA:  Reviewed.      ASSESSMENT:   1.  An 84-year-old female with kappa free light chain multiple myeloma on Velcade and dexamethasone. She is tolerating it well.   2.  Back pain from multiple myeloma and thoracic compression fracture.   3.  Mild anemia and thrombocytopenia. Stable.      PLAN:   1.  The patient overall is tolerating Velcade well.  Discussed regarding increasing the dose of Velcade as she is tolerating it well and labs are good.  She is agreeable for it.  We will give her Velcade at 1.3 mg/m2.  She will continue on dexamethasone 20 mg once a week.      Side effects reviewed.  Discussed regarding neuropathy.  She will let me know if she develops any neuropathy. We will continue to monitor her.  With each cycle we will check her kappa free light chain.  I am hoping that she will respond well.      2.  I will see her in about 3 weeks' time.  I advised her to return sooner if she has fever, chills, recurrent vomiting, bleeding, worsening weakness or any other concerns.         ITZ LOPEZ MD             D: 2017 18:13   T: 2017 04:56   MT: cony      Name:     ALBERTO ARREOLA   MRN:      [id number]        Account:      [id number]   :      1932            Visit Date:   04/04/2017      Document: [id number]

## 2017-04-07 ENCOUNTER — ANTICOAGULATION THERAPY VISIT (OUTPATIENT)
Dept: NURSING | Facility: CLINIC | Age: 82
End: 2017-04-07
Payer: COMMERCIAL

## 2017-04-07 DIAGNOSIS — Z79.01 LONG-TERM (CURRENT) USE OF ANTICOAGULANTS: ICD-10-CM

## 2017-04-07 LAB — INR POINT OF CARE: 2.7 (ref 0.86–1.14)

## 2017-04-07 PROCEDURE — 36416 COLLJ CAPILLARY BLOOD SPEC: CPT

## 2017-04-07 PROCEDURE — 85610 PROTHROMBIN TIME: CPT | Mod: QW

## 2017-04-07 NOTE — MR AVS SNAPSHOT
Amira Arreola   4/7/2017 9:45 AM   Anticoagulation Therapy Visit    Description:  84 year old female   Provider:   ANTICOAGULATION CLINIC   Department:  Ec Nurse           INR as of 4/7/2017     Today's INR 2.7      Anticoagulation Summary as of 4/7/2017     INR goal 2.0-3.0   Today's INR 2.7   Full instructions 6 mg on Mon, Wed, Fri; 4 mg all other days   Next INR check 4/21/2017    Indications   Long-term (current) use of anticoagulants [Z79.01] [Z79.01]  Atrial fibrillation (H) [I48.91] (Resolved) [I48.91]         Description     2.7 today.  Continue with 6 mg Mon, Wed, Fri;  4 mg all other days.  Recheck in 2 weeks.         Your next Anticoagulation Clinic appointment(s)     Apr 07, 2017  9:45 AM CDT   Anticoagulation Visit with [facility] ANTICOAGULATION CLINIC   [facility] ([facility])    [address] [place] [address]   [phone]            Apr 21, 2017  9:30 AM CDT   Anticoagulation Visit with EC ANTICOAGULATION CLINIC   [facility] ([address] [address]   [phone]              Contact Numbers     Clinic Number:         April 2017 Details    Sun Mon Tue Wed Thu Fri Sat           1                 2               3               4               5               6               7      6 mg   See details      8      4 mg           9      4 mg         10      6 mg         11      4 mg         12      6 mg         13      4 mg         14      6 mg         15      4 mg           16      4 mg         17      6 mg         18      4 mg         19      6 mg         20      4 mg         21            22                 23               24               25               26               27               28               29                 30                      Date Details   04/07 This INR check       Date of next INR:  4/21/2017         How to take your warfarin dose      To take:  4 mg Take 1 of the 4 mg tablets.    To take:  6 mg Take 1.5 of the 4 mg tablets.

## 2017-04-07 NOTE — PROGRESS NOTES
ANTICOAGULATION FOLLOW-UP CLINIC VISIT    Patient Name:  Amira Arreola  Date:  4/7/2017  Contact Type:  Face to Face    SUBJECTIVE:     Patient Findings     Positives No Problem Findings           OBJECTIVE    INR Protime   Date Value Ref Range Status   04/07/2017 2.7 (A) 0.86 - 1.14 Final       ASSESSMENT / PLAN  INR assessment THER    Recheck INR In: 2 WEEKS    INR Location Clinic      Anticoagulation Summary as of 4/7/2017     INR goal 2.0-3.0   Today's INR 2.7   Maintenance plan 6 mg (4 mg x 1.5) on Mon, Wed, Fri; 4 mg (4 mg x 1) all other days   Full instructions 6 mg on Mon, Wed, Fri; 4 mg all other days   Weekly total 34 mg   No change documented Dayana Barrera RN   Plan last modified Dayana Barrera RN (3/10/2017)   Next INR check 4/21/2017   Target end date Indefinite    Indications   Long-term (current) use of anticoagulants [Z79.01] [Z79.01]  Atrial fibrillation (H) [I48.91] (Resolved) [I48.91]         Anticoagulation Episode Summary     INR check location     Preferred lab     Send INR reminders to [facility]    Comments PATIENT TAKES WARFARIN IN THE MORNING      Anticoagulation Care Providers     Provider Role Specialty Phone number    Addy Frias MD Responsible Internal Medicine [phone]            See the Encounter Report to view Anticoagulation Flowsheet and Dosing Calendar (Go to Encounters tab in chart review, and find the Anticoagulation Therapy Visit)    Dosage adjustment made based on physician directed care plan.    2.7 today.  Continue with 6 mg Mon, Wed, Fri;  4 mg all other days.  Recheck in 2 weeks.     Dayana Barrera RN

## 2017-04-11 ENCOUNTER — INFUSION THERAPY VISIT (OUTPATIENT)
Dept: INFUSION THERAPY | Facility: CLINIC | Age: 82
End: 2017-04-11
Attending: INTERNAL MEDICINE
Payer: MEDICARE

## 2017-04-11 ENCOUNTER — HOSPITAL ENCOUNTER (OUTPATIENT)
Facility: CLINIC | Age: 82
Setting detail: SPECIMEN
Discharge: HOME | End: 2017-04-11
Admitting: INTERNAL MEDICINE
Payer: MEDICARE

## 2017-04-11 VITALS
HEIGHT: 66 IN | DIASTOLIC BLOOD PRESSURE: 65 MMHG | SYSTOLIC BLOOD PRESSURE: 114 MMHG | WEIGHT: 129.2 LBS | TEMPERATURE: 98.3 F | HEART RATE: 72 BPM | BODY MASS INDEX: 20.76 KG/M2 | RESPIRATION RATE: 16 BRPM

## 2017-04-11 DIAGNOSIS — C90.00 MULTIPLE MYELOMA NOT HAVING ACHIEVED REMISSION (H): ICD-10-CM

## 2017-04-11 DIAGNOSIS — C90.00 MULTIPLE MYELOMA NOT HAVING ACHIEVED REMISSION (H): Primary | ICD-10-CM

## 2017-04-11 DIAGNOSIS — C90.01 MULTIPLE MYELOMA IN REMISSION (H): ICD-10-CM

## 2017-04-11 LAB
BASOPHILS # BLD AUTO: 0 10E9/L (ref 0–0.2)
BASOPHILS NFR BLD AUTO: 0 %
CREAT SERPL-MCNC: 0.57 MG/DL (ref 0.52–1.04)
DIFFERENTIAL METHOD BLD: ABNORMAL
EOSINOPHIL # BLD AUTO: 0 10E9/L (ref 0–0.7)
EOSINOPHIL NFR BLD AUTO: 0.6 %
ERYTHROCYTE [DISTWIDTH] IN BLOOD BY AUTOMATED COUNT: 15.3 % (ref 10–15)
GFR SERPL CREATININE-BSD FRML MDRD: NORMAL ML/MIN/1.7M2
HCT VFR BLD AUTO: 36.2 % (ref 35–47)
HGB BLD-MCNC: 12.1 G/DL (ref 11.7–15.7)
IMM GRANULOCYTES # BLD: 0 10E9/L (ref 0–0.4)
IMM GRANULOCYTES NFR BLD: 0.4 %
LYMPHOCYTES # BLD AUTO: 0.9 10E9/L (ref 0.8–5.3)
LYMPHOCYTES NFR BLD AUTO: 18.3 %
MCH RBC QN AUTO: 33.8 PG (ref 26.5–33)
MCHC RBC AUTO-ENTMCNC: 33.4 G/DL (ref 31.5–36.5)
MCV RBC AUTO: 101 FL (ref 78–100)
MONOCYTES # BLD AUTO: 0.4 10E9/L (ref 0–1.3)
MONOCYTES NFR BLD AUTO: 8.5 %
NEUTROPHILS # BLD AUTO: 3.6 10E9/L (ref 1.6–8.3)
NEUTROPHILS NFR BLD AUTO: 72.2 %
NRBC # BLD AUTO: 0 10*3/UL
NRBC BLD AUTO-RTO: 0 /100
PLATELET # BLD AUTO: 128 10E9/L (ref 150–450)
RBC # BLD AUTO: 3.58 10E12/L (ref 3.8–5.2)
WBC # BLD AUTO: 4.9 10E9/L (ref 4–11)

## 2017-04-11 PROCEDURE — 85025 COMPLETE CBC W/AUTO DIFF WBC: CPT | Performed by: INTERNAL MEDICINE

## 2017-04-11 PROCEDURE — 96401 CHEMO ANTI-NEOPL SQ/IM: CPT

## 2017-04-11 PROCEDURE — 36415 COLL VENOUS BLD VENIPUNCTURE: CPT

## 2017-04-11 PROCEDURE — 25000128 H RX IP 250 OP 636: Mod: JW | Performed by: INTERNAL MEDICINE

## 2017-04-11 PROCEDURE — 82565 ASSAY OF CREATININE: CPT

## 2017-04-11 RX ORDER — DEXAMETHASONE 4 MG/1
20 TABLET ORAL WEEKLY
Qty: 20 TABLET | Refills: 0 | Status: SHIPPED | OUTPATIENT
Start: 2017-04-11 | End: 2017-04-25

## 2017-04-11 RX ADMIN — BORTEZOMIB 2.1 MG: 3.5 INJECTION, POWDER, LYOPHILIZED, FOR SOLUTION INTRAVENOUS; SUBCUTANEOUS at 11:50

## 2017-04-11 ASSESSMENT — PAIN SCALES - GENERAL: PAINLEVEL: NO PAIN (0)

## 2017-04-11 NOTE — MR AVS SNAPSHOT
After Visit Summary   4/11/2017    Amira Arreola    MRN: [id number]           Patient Information     Date Of Birth          7/17/1932        Visit Information        Provider Department      4/11/2017 11:00 AM  INFUSION CHAIR 11 [facility]        Today's Diagnoses     Multiple myeloma not having achieved remission (H)    -  1    Multiple myeloma in remission (H)           Follow-ups after your visit        Your next 10 appointments already scheduled     Apr 18, 2017 11:00 AM CDT   Level 2 with  INFUSION CHAIR 8   [facility] ([facility])    [facility]  [address]  [place] [address]   [phone]            Apr 21, 2017  9:30 AM CDT   Anticoagulation Visit with EC ANTICOAGULATION CLINIC   [facility] ([address] [address]   [phone]            Apr 25, 2017  1:00 PM CDT   Level 2 with  INFUSION CHAIR 17   [facility] ([facility])    [facility]  [address]  [place] [address]   [phone]            Apr 25, 2017  2:00 PM CDT   Return Visit with Shayne Roberts MD   [facility] ([facility])    [facility]  [address]  [place] [address] [phone]              Who to contact     If you have questions or need follow up information about today's clinic visit or your schedule please contact [facility] directly at [phone].  Normal or non-critical lab and imaging results will be communicated to you by MyChart, letter or phone within 4 business days after the clinic has received the results. If you do not hear from us within 7 days, please contact the clinic through MyChart or phone. If you have a critical or  "abnormal lab result, we will notify you by phone as soon as possible.  Submit refill requests through Whatser or call your pharmacy and they will forward the refill request to us. Please allow 3 business days for your refill to be completed.          Additional Information About Your Visit        Symvatohart Information     Whatser lets you send messages to your doctor, view your test results, renew your prescriptions, schedule appointments and more. To sign up, go to [email or web address] . Click on \"Log in\" on the left side of the screen, which will take you to the Welcome page. Then click on \"Sign up Now\" on the right side of the page.     You will be asked to enter the access code listed below, as well as some personal information. Please follow the directions to create your username and password.     Your access code is: [email or web address]  Expires: 2017 11:09 AM     Your access code will  in 90 days. If you need help or a new code, please call your [place] clinic or [phone].        Care EveryWhere ID     This is your Care EveryWhere ID. This could be used by other organizations to access your [place] medical records  [id number]        Your Vitals Were     Pulse Temperature Respirations Height BMI (Body Mass Index)       72 98.3  F (36.8  C) (Oral) 16 1.676 m (5' 5.98\") 20.86 kg/m2        Blood Pressure from Last 3 Encounters:   17 114/65   17 120/58   17 120/58    Weight from Last 3 Encounters:   17 58.6 kg (129 lb 3.2 oz)   17 59.1 kg (130 lb 6.4 oz)   17 59.1 kg (130 lb 6.4 oz)              We Performed the Following     CBC with platelets differential     Creatinine     Nursing Communication 1     Treatment Conditions        Primary Care Provider Office Phone # Fax #    Addy Frias [phone]       [facility] [address]  [place] [address]        Thank you!     Thank you for choosing [facility] " [facility]  for your care. Our goal is always to provide you with excellent care. Hearing back from our patients is one way we can continue to improve our services. Please take a few minutes to complete the written survey that you may receive in the mail after your visit with us. Thank you!             Your Updated Medication List - Protect others around you: Learn how to safely use, store and throw away your medicines at www.disposemymeds.org.          This list is accurate as of: 4/11/17 12:02 PM.  Always use your most recent med list.                   Brand Name Dispense Instructions for use    acyclovir 400 MG tablet    ZOVIRAX    60 tablet    Take 1 tablet (400 mg) by mouth 2 times daily Viral Prophylaxis.       ASPIRIN NOT PRESCRIBED    INTENTIONAL    0 each    Antiplatelet medication not prescribed intentionally due to Current anticoagulant therapy (warfarin/enoxaparin)       * ATIVAN 0.5 MG tablet   Generic drug:  LORazepam     10 tablet    1 hour prior to MRI take 1 tablet (0.5 mg) and may repeat x's 1.       * LORazepam 0.5 MG tablet    ATIVAN    30 tablet    Take 1 tablet (0.5 mg) by mouth every 4 hours as needed (Anxiety, Nausea/Vomiting or Sleep)       CALCIUM CITRATE + PO      Take 2,000 mg by mouth 2 tabs       carboxymethylcellulose 0.5 % Soln ophthalmic solution    REFRESH PLUS     1 drop 4 times daily       CLARINEX PO      Taking claritin       * COMPAZINE PO      Take 10 mg by mouth       * prochlorperazine 10 MG tablet    COMPAZINE    30 tablet    Take 1 tablet (10 mg) by mouth every 6 hours as needed (Nausea/Vomiting)       cycloSPORINE 0.05 % ophthalmic emulsion    RESTASIS     Place 1 drop into both eyes every 12 hours       DAILY MULTIVITAMIN PO      Take 1 tablet by mouth daily.       dexamethasone 4 MG tablet    DECADRON    20 tablet    Take 5 tablets (20 mg) by mouth once a week       erythromycin ophthalmic ointment    ROMYCIN     Place 1 Application into both eyes At Bedtime        GENTLE STOOL SOFTENER PO      Take 100 mg by mouth daily       metoprolol 50 MG 24 hr tablet    TOPROL-XL    180 tablet    Take 1 tablet (50 mg) by mouth 2 times daily       OXYCODONE HCL PO      Take by mouth as needed       polyethylene glycol powder    MIRALAX/GLYCOLAX     Take 1 capful by mouth daily       timolol 0.25 % ophthalmic solution    TIMOPTIC     1 drop every morning       TYLENOL PO      Take 500 mg by mouth every 6 hours as needed for mild pain or fever       UNABLE TO FIND      MEDICATION NAME: Fresh Coat eye drops       VITAMIN D3 PO      Take 1,000 Units by mouth daily       warfarin 4 MG tablet    COUMADIN    90 tablet    Takes 4 mg on Tues, Sat,  6 mg all other days or as directed by the ACC.       ZOMETA IV      Inject into the vein every 30 days Every 3 month dosing       * Notice:  This list has 4 medication(s) that are the same as other medications prescribed for you. Read the directions carefully, and ask your doctor or other care provider to review them with you.

## 2017-04-11 NOTE — PROGRESS NOTES
Infusion Nursing Note:  Amira Arreola presents today for labs, D22,[id number].    Patient seen by provider today: No   present during visit today: Not Applicable.    Note: No concerns or changes to report..    Intravenous Access:  Lab draw site left AC, Needle type butterfly, Gauge 23.  Labs drawn without difficulty.    Treatment Conditions:  Lab Results   Component Value Date    HGB 12.1 04/11/2017     Lab Results   Component Value Date    WBC 4.9 04/11/2017      Lab Results   Component Value Date    ANEU 3.6 04/11/2017     Lab Results   Component Value Date     04/11/2017      Lab Results   Component Value Date     10/12/2016                   Lab Results   Component Value Date    POTASSIUM 4.4 10/12/2016           No results found for: MAG         Lab Results   Component Value Date    CR 0.57 04/11/2017                   Lab Results   Component Value Date    BRADLEY 9.0 10/12/2016                Lab Results   Component Value Date    BILITOTAL 0.4 03/21/2017           Lab Results   Component Value Date    ALBUMIN 3.4 03/21/2017                    Lab Results   Component Value Date    ALT 25 03/21/2017           Lab Results   Component Value Date    AST 20 03/21/2017     Results reviewed, labs MET treatment parameters, ok to proceed with treatment.      Post Infusion Assessment:  Patient tolerated injection without incident.    Discharge Plan:   Patient declined prescription refills.  Discharge instructions reviewed with: Patient.  Patient and/or family verbalized understanding of discharge instructions and all questions answered.  Copy of AVS reviewed with patient and/or family.  Patient will return 4/18 for next appointment.  Patient discharged in stable condition accompanied by: self.  Departure Mode: Ambulatory.    Rosangela Reese RN

## 2017-04-18 ENCOUNTER — INFUSION THERAPY VISIT (OUTPATIENT)
Dept: INFUSION THERAPY | Facility: CLINIC | Age: 82
End: 2017-04-18
Attending: INTERNAL MEDICINE
Payer: MEDICARE

## 2017-04-18 ENCOUNTER — HOSPITAL ENCOUNTER (OUTPATIENT)
Facility: CLINIC | Age: 82
Setting detail: SPECIMEN
Discharge: HOME | End: 2017-04-18
Attending: INTERNAL MEDICINE | Admitting: INTERNAL MEDICINE
Payer: MEDICARE

## 2017-04-18 VITALS
HEIGHT: 66 IN | HEART RATE: 72 BPM | SYSTOLIC BLOOD PRESSURE: 140 MMHG | WEIGHT: 134.4 LBS | BODY MASS INDEX: 21.6 KG/M2 | DIASTOLIC BLOOD PRESSURE: 72 MMHG | OXYGEN SATURATION: 98 % | RESPIRATION RATE: 16 BRPM | TEMPERATURE: 97.7 F

## 2017-04-18 DIAGNOSIS — C90.00 MULTIPLE MYELOMA NOT HAVING ACHIEVED REMISSION (H): Primary | ICD-10-CM

## 2017-04-18 LAB
ALBUMIN SERPL-MCNC: 3.3 G/DL (ref 3.4–5)
ALP SERPL-CCNC: 49 U/L (ref 40–150)
ALT SERPL W P-5'-P-CCNC: 25 U/L (ref 0–50)
AST SERPL W P-5'-P-CCNC: 22 U/L (ref 0–45)
BASOPHILS # BLD AUTO: 0 10E9/L (ref 0–0.2)
BASOPHILS NFR BLD AUTO: 0 %
BILIRUB DIRECT SERPL-MCNC: 0.1 MG/DL (ref 0–0.2)
BILIRUB SERPL-MCNC: 0.5 MG/DL (ref 0.2–1.3)
DIFFERENTIAL METHOD BLD: ABNORMAL
EOSINOPHIL # BLD AUTO: 0.1 10E9/L (ref 0–0.7)
EOSINOPHIL NFR BLD AUTO: 1.1 %
ERYTHROCYTE [DISTWIDTH] IN BLOOD BY AUTOMATED COUNT: 15.4 % (ref 10–15)
HCT VFR BLD AUTO: 35.9 % (ref 35–47)
HGB BLD-MCNC: 11.9 G/DL (ref 11.7–15.7)
IMM GRANULOCYTES # BLD: 0 10E9/L (ref 0–0.4)
IMM GRANULOCYTES NFR BLD: 0.2 %
LYMPHOCYTES # BLD AUTO: 1.2 10E9/L (ref 0.8–5.3)
LYMPHOCYTES NFR BLD AUTO: 26.1 %
MCH RBC QN AUTO: 33.5 PG (ref 26.5–33)
MCHC RBC AUTO-ENTMCNC: 33.1 G/DL (ref 31.5–36.5)
MCV RBC AUTO: 101 FL (ref 78–100)
MONOCYTES # BLD AUTO: 0.3 10E9/L (ref 0–1.3)
MONOCYTES NFR BLD AUTO: 6.9 %
NEUTROPHILS # BLD AUTO: 3 10E9/L (ref 1.6–8.3)
NEUTROPHILS NFR BLD AUTO: 65.7 %
NRBC # BLD AUTO: 0 10*3/UL
NRBC BLD AUTO-RTO: 0 /100
PLATELET # BLD AUTO: 121 10E9/L (ref 150–450)
PROT SERPL-MCNC: 5.9 G/DL (ref 6.8–8.8)
RBC # BLD AUTO: 3.55 10E12/L (ref 3.8–5.2)
WBC # BLD AUTO: 4.5 10E9/L (ref 4–11)

## 2017-04-18 PROCEDURE — 85025 COMPLETE CBC W/AUTO DIFF WBC: CPT | Performed by: INTERNAL MEDICINE

## 2017-04-18 PROCEDURE — 84165 PROTEIN E-PHORESIS SERUM: CPT | Performed by: INTERNAL MEDICINE

## 2017-04-18 PROCEDURE — 96401 CHEMO ANTI-NEOPL SQ/IM: CPT

## 2017-04-18 PROCEDURE — 25000128 H RX IP 250 OP 636: Mod: JW | Performed by: INTERNAL MEDICINE

## 2017-04-18 PROCEDURE — 80076 HEPATIC FUNCTION PANEL: CPT | Performed by: INTERNAL MEDICINE

## 2017-04-18 PROCEDURE — 83883 ASSAY NEPHELOMETRY NOT SPEC: CPT | Performed by: INTERNAL MEDICINE

## 2017-04-18 PROCEDURE — 00000402 ZZHCL STATISTIC TOTAL PROTEIN: Performed by: INTERNAL MEDICINE

## 2017-04-18 RX ORDER — DEXAMETHASONE 4 MG/1
20 TABLET ORAL WEEKLY
Qty: 20 TABLET | Refills: 0 | Status: SHIPPED | OUTPATIENT
Start: 2017-04-18 | End: 2017-05-23

## 2017-04-18 RX ADMIN — BORTEZOMIB 2.1 MG: 3.5 INJECTION, POWDER, LYOPHILIZED, FOR SOLUTION INTRAVENOUS; SUBCUTANEOUS at 12:24

## 2017-04-18 NOTE — MR AVS SNAPSHOT
After Visit Summary   4/18/2017    Amira Arreola    MRN: [id number]           Patient Information     Date Of Birth          7/17/1932        Visit Information        Provider Department      4/18/2017 11:00 AM  INFUSION CHAIR 8 [facility]        Today's Diagnoses     Multiple myeloma not having achieved remission (H)    -  1       Follow-ups after your visit        Your next 10 appointments already scheduled     Apr 21, 2017  9:30 AM CDT   Anticoagulation Visit with EC ANTICOAGULATION CLINIC   [facility] ([address] [address]   [phone]            Apr 25, 2017  1:00 PM CDT   Level 2 with  INFUSION CHAIR 17   [facility] ([facility])    [facility]  [address]  [facility] [address] [phone]            Apr 25, 2017  2:00 PM CDT   Return Visit with Shayne Roberts MD   [facility] ([facility])    [facility]  [address]  [facility] [address] [phone]              Who to contact     If you have questions or need follow up information about today's clinic visit or your schedule please contact [facility] directly at [phone].  Normal or non-critical lab and imaging results will be communicated to you by MyChart, letter or phone within 4 business days after the clinic has received the results. If you do not hear from us within 7 days, please contact the clinic through MyChart or phone. If you have a critical or abnormal lab result, we will notify you by phone as soon as possible.  Submit refill requests through [email or web address] or call your pharmacy and they will forward the refill request to us. Please allow 3 business days for your refill to be completed.          Additional Information About Your  "Visit        BioCritica Information     BioCritica lets you send messages to your doctor, view your test results, renew your prescriptions, schedule appointments and more. To sign up, go to [email or web address] . Click on \"Log in\" on the left side of the screen, which will take you to the Welcome page. Then click on \"Sign up Now\" on the right side of the page.     You will be asked to enter the access code listed below, as well as some personal information. Please follow the directions to create your username and password.     Your access code is: [email or web address]  Expires: 2017 11:09 AM     Your access code will  in 90 days. If you need help or a new code, please call your [place] clinic or [phone].        Care EveryWhere ID     This is your Care EveryWhere ID. This could be used by other organizations to access your [place] medical records  [id number]        Your Vitals Were     Pulse Temperature Respirations Height Pulse Oximetry BMI (Body Mass Index)    72 97.7  F (36.5  C) (Oral) 16 1.676 m (5' 5.98\") 98% 21.7 kg/m2       Blood Pressure from Last 3 Encounters:   17 140/72   17 114/65   17 120/58    Weight from Last 3 Encounters:   17 61 kg (134 lb 6.4 oz)   17 58.6 kg (129 lb 3.2 oz)   17 59.1 kg (130 lb 6.4 oz)              We Performed the Following     CBC with platelets differential     Hepatic panel     Kappa and lambda light chain     Protein electrophoresis          Today's Medication Changes          These changes are accurate as of: 17 12:33 PM.  If you have any questions, ask your nurse or doctor.               These medicines have changed or have updated prescriptions.        Dose/Directions    * dexamethasone 4 MG tablet   Commonly known as:  DECADRON   This may have changed:  Another medication with the same name was added. Make sure you understand how and when to take each.   Used for:  Multiple myeloma not having achieved remission (H)        " Dose:  20 mg   Take 5 tablets (20 mg) by mouth once a week   Quantity:  20 tablet   Refills:  0       * dexamethasone 4 MG tablet   Commonly known as:  DECADRON   This may have changed:  You were already taking a medication with the same name, and this prescription was added. Make sure you understand how and when to take each.   Used for:  Multiple myeloma not having achieved remission (H)        Dose:  20 mg   Take 5 tablets (20 mg) by mouth once a week   Quantity:  20 tablet   Refills:  0       * Notice:  This list has 2 medication(s) that are the same as other medications prescribed for you. Read the directions carefully, and ask your doctor or other care provider to review them with you.         Where to get your medicines      These medications [facility] [place] [facility] [address] [address] [facility] [address]  [address], [place] [address]     Phone:  [phone]     dexamethasone 4 MG tablet                Primary Care Provider Office Phone # Fax #    Addy Frias [phone]       [facility] [address] [facility] [address]  [facility] [address]        Thank you!     Thank you for choosing [facility] AND [facility]  for your care. Our goal is always to provide you with excellent care. Hearing back from our patients is one way we can continue to improve our services. Please take a few minutes to complete the written survey that you may receive in the mail after your visit with us. Thank you!             Your Updated Medication List - Protect others around you: Learn how to safely use, store and throw away your medicines at www.disposemymeds.org.          This list is accurate as of: 4/18/17 12:33 PM.  Always use your most recent med list.                   Brand Name Dispense Instructions for use    acyclovir 400 MG tablet    ZOVIRAX    60 tablet    Take 1 tablet (400 mg) by mouth 2 times daily Viral Prophylaxis.        ASPIRIN NOT PRESCRIBED    INTENTIONAL    0 each    Antiplatelet medication not prescribed intentionally due to Current anticoagulant therapy (warfarin/enoxaparin)       * ATIVAN 0.5 MG tablet   Generic drug:  LORazepam     10 tablet    1 hour prior to MRI take 1 tablet (0.5 mg) and may repeat x's 1.       * LORazepam 0.5 MG tablet    ATIVAN    30 tablet    Take 1 tablet (0.5 mg) by mouth every 4 hours as needed (Anxiety, Nausea/Vomiting or Sleep)       CALCIUM CITRATE + PO      Take 2,000 mg by mouth 2 tabs       carboxymethylcellulose 0.5 % Soln ophthalmic solution    REFRESH PLUS     1 drop 4 times daily       CLARINEX PO      Taking claritin       * COMPAZINE PO      Take 10 mg by mouth       * prochlorperazine 10 MG tablet    COMPAZINE    30 tablet    Take 1 tablet (10 mg) by mouth every 6 hours as needed (Nausea/Vomiting)       cycloSPORINE 0.05 % ophthalmic emulsion    RESTASIS     Place 1 drop into both eyes every 12 hours       DAILY MULTIVITAMIN PO      Take 1 tablet by mouth daily.       * dexamethasone 4 MG tablet    DECADRON    20 tablet    Take 5 tablets (20 mg) by mouth once a week       * dexamethasone 4 MG tablet    DECADRON    20 tablet    Take 5 tablets (20 mg) by mouth once a week       erythromycin ophthalmic ointment    ROMYCIN     Place 1 Application into both eyes At Bedtime       GENTLE STOOL SOFTENER PO      Take 100 mg by mouth daily       metoprolol 50 MG 24 hr tablet    TOPROL-XL    180 tablet    Take 1 tablet (50 mg) by mouth 2 times daily       OXYCODONE HCL PO      Take by mouth as needed       polyethylene glycol powder    MIRALAX/GLYCOLAX     Take 1 capful by mouth daily       timolol 0.25 % ophthalmic solution    TIMOPTIC     1 drop every morning       TYLENOL PO      Take 500 mg by mouth every 6 hours as needed for mild pain or fever       UNABLE TO FIND      MEDICATION NAME: Fresh Coat eye drops       VITAMIN D3 PO      Take 1,000 Units by mouth daily       warfarin 4 MG tablet     COUMADIN    90 tablet    Takes 4 mg on Tues, Sat,  6 mg all other days or as directed by the ACC.       ZOMETA IV      Inject into the vein every 30 days Every 3 month dosing       * Notice:  This list has 6 medication(s) that are the same as other medications prescribed for you. Read the directions carefully, and ask your doctor or other care provider to review them with you.

## 2017-04-18 NOTE — PROGRESS NOTES
Infusion Nursing Note:  Amira Arreola presents today for C2D1 velcade.    Patient seen by provider today: No   present during visit today: Not Applicable.    Note: N/A.    Intravenous Access:  Lab draw site RAC, Needle type BF, Gauge 23.  Labs drawn without difficulty.    Treatment Conditions:  Lab Results   Component Value Date    HGB 11.9 04/18/2017     Lab Results   Component Value Date    WBC 4.5 04/18/2017      Lab Results   Component Value Date    ANEU 3.0 04/18/2017     Lab Results   Component Value Date     04/18/2017      Lab Results   Component Value Date     10/12/2016                   Lab Results   Component Value Date    POTASSIUM 4.4 10/12/2016           No results found for: MAG         Lab Results   Component Value Date    CR 0.57 04/11/2017                   Lab Results   Component Value Date    BRADLEY 9.0 10/12/2016                Lab Results   Component Value Date    BILITOTAL 0.5 04/18/2017           Lab Results   Component Value Date    ALBUMIN 3.3 04/18/2017                    Lab Results   Component Value Date    ALT 25 04/18/2017           Lab Results   Component Value Date    AST 22 04/18/2017     Results reviewed, labs MET treatment parameters, ok to proceed with treatment.      Post Infusion Assessment:  Patient tolerated injection without incident.    Discharge Plan:   Patient and/or family verbalized understanding of discharge instructions and all questions answered.  Copy of AVS reviewed with patient and/or family.  Patient will return next Tuesday for next appointment.  Patient discharged in stable condition accompanied by: self.    Jamari Gamboa RN    Gaby Stewart RN

## 2017-04-19 LAB
ALBUMIN SERPL ELPH-MCNC: 4.4 G/DL (ref 3.7–5.1)
ALPHA1 GLOB SERPL ELPH-MCNC: 0.4 G/DL (ref 0.2–0.4)
ALPHA2 GLOB SERPL ELPH-MCNC: 0.8 G/DL (ref 0.5–0.9)
B-GLOBULIN SERPL ELPH-MCNC: 0.8 G/DL (ref 0.6–1)
GAMMA GLOB SERPL ELPH-MCNC: 0.4 G/DL (ref 0.7–1.6)
KAPPA LC UR-MCNC: 41.75 MG/DL (ref 0.33–1.94)
KAPPA LC/LAMBDA SER: 62.31 {RATIO} (ref 0.26–1.65)
LAMBDA LC SERPL-MCNC: 0.67 MG/DL (ref 0.57–2.63)
M PROTEIN SERPL ELPH-MCNC: 0 G/DL
PROT PATTERN SERPL ELPH-IMP: ABNORMAL

## 2017-04-21 ENCOUNTER — ANTICOAGULATION THERAPY VISIT (OUTPATIENT)
Dept: NURSING | Facility: CLINIC | Age: 82
End: 2017-04-21
Payer: COMMERCIAL

## 2017-04-21 DIAGNOSIS — Z79.01 LONG-TERM (CURRENT) USE OF ANTICOAGULANTS: ICD-10-CM

## 2017-04-21 LAB — INR POINT OF CARE: 2.5 (ref 0.86–1.14)

## 2017-04-21 PROCEDURE — 85610 PROTHROMBIN TIME: CPT | Mod: QW

## 2017-04-21 PROCEDURE — 36416 COLLJ CAPILLARY BLOOD SPEC: CPT

## 2017-04-21 NOTE — MR AVS SNAPSHOT
Amira Arreola   4/21/2017 9:30 AM   Anticoagulation Therapy Visit    Description:  84 year old female   Provider:   ANTICOAGULATION CLINIC   Department:  Ec Nurse           INR as of 4/21/2017     Today's INR 2.5      Anticoagulation Summary as of 4/21/2017     INR goal 2.0-3.0   Today's INR 2.5   Full instructions 6 mg on Mon, Wed, Fri; 4 mg all other days   Next INR check 5/12/2017    Indications   Long-term (current) use of anticoagulants [Z79.01] [Z79.01]  Atrial fibrillation (H) [I48.91] (Resolved) [I48.91]         Description     2.5 today.  Continue with 6 mg on Mon, Wed, Fri;  4 mg all other days.  Recheck in 3 weeks.         Your next Anticoagulation Clinic appointment(s)     May 12, 2017  9:30 AM CDT   Anticoagulation Visit with  ANTICOAGULATION CLINIC   [facility]    [address] [place] [id number] [phone]              Contact Numbers     Clinic Number:         April 2017 Details    Sun Mon Tue Wed Thu Fri Sat           1                 2               3               4               5               6               7               8                 9               10               11               12               13               14               15                 16               17               18               19               20               21      6 mg   See details      22      4 mg           23      4 mg         24      6 mg         25      4 mg         26      6 mg         27      4 mg         28      6 mg         29      4 mg           30      4 mg                Date Details   04/21 This INR check               How to take your warfarin dose     To take:  4 mg Take 1 of the 4 mg tablets.    To take:  6 mg Take 1.5 of the 4 mg tablets.           May 2017 Details    Sun Mon Tue Wed Thu Fri Sat      1      6 mg         2      4 mg         3      6 mg         4      4 mg         5      6 mg         6       4 mg           7      4 mg         8      6 mg         9      4 mg         10      6 mg         11      4 mg         12            13                 14               15               16               17               18               19               20                 21               22               23               24               25               26               27                 28               29               30               31                   Date Details   No additional details    Date of next INR:  5/12/2017         How to take your warfarin dose     To take:  4 mg Take 1 of the 4 mg tablets.    To take:  6 mg Take 1.5 of the 4 mg tablets.

## 2017-04-21 NOTE — PROGRESS NOTES
ANTICOAGULATION FOLLOW-UP CLINIC VISIT    Patient Name:  Amira Arreola  Date:  4/21/2017  Contact Type:  Face to Face    SUBJECTIVE:     Patient Findings     Positives No Problem Findings           OBJECTIVE    INR Protime   Date Value Ref Range Status   04/21/2017 2.5 (A) 0.86 - 1.14 Final       ASSESSMENT / PLAN  INR assessment THER    Recheck INR In: 3 WEEKS    INR Location Clinic      Anticoagulation Summary as of 4/21/2017     INR goal 2.0-3.0   Today's INR 2.5   Maintenance plan 6 mg (4 mg x 1.5) on Mon, Wed, Fri; 4 mg (4 mg x 1) all other days   Full instructions 6 mg on Mon, Wed, Fri; 4 mg all other days   Weekly total 34 mg   No change documented Dayana Barrera RN   Plan last modified Dayana Barrera RN (3/10/2017)   Next INR check 5/12/2017   Target end date Indefinite    Indications   Long-term (current) use of anticoagulants [Z79.01] [Z79.01]  Atrial fibrillation (H) [I48.91] (Resolved) [I48.91]         Anticoagulation Episode Summary     INR check location     Preferred lab     Send INR reminders to [facility]    Comments PATIENT TAKES WARFARIN IN THE MORNING      Anticoagulation Care Providers     Provider Role Specialty Phone number    Addy Frais MD Responsible Internal Medicine [phone]            See the Encounter Report to view Anticoagulation Flowsheet and Dosing Calendar (Go to Encounters tab in chart review, and find the Anticoagulation Therapy Visit)    Dosage adjustment made based on physician directed care plan.    2.5 today.  Continue with 6 mg on Mon, Wed, Fri;  4 mg all other days.  Recheck in 3 weeks.     Dayana Barrera RN

## 2017-04-25 ENCOUNTER — ONCOLOGY VISIT (OUTPATIENT)
Dept: ONCOLOGY | Facility: CLINIC | Age: 82
End: 2017-04-25
Attending: INTERNAL MEDICINE
Payer: MEDICARE

## 2017-04-25 ENCOUNTER — INFUSION THERAPY VISIT (OUTPATIENT)
Dept: INFUSION THERAPY | Facility: CLINIC | Age: 82
End: 2017-04-25
Attending: INTERNAL MEDICINE
Payer: MEDICARE

## 2017-04-25 ENCOUNTER — HOSPITAL ENCOUNTER (OUTPATIENT)
Facility: CLINIC | Age: 82
Setting detail: SPECIMEN
Discharge: HOME | End: 2017-04-25
Attending: INTERNAL MEDICINE | Admitting: INTERNAL MEDICINE
Payer: MEDICARE

## 2017-04-25 VITALS
RESPIRATION RATE: 18 BRPM | DIASTOLIC BLOOD PRESSURE: 66 MMHG | SYSTOLIC BLOOD PRESSURE: 130 MMHG | WEIGHT: 137.4 LBS | HEART RATE: 75 BPM | OXYGEN SATURATION: 97 % | TEMPERATURE: 98.4 F | BODY MASS INDEX: 22.19 KG/M2

## 2017-04-25 VITALS
SYSTOLIC BLOOD PRESSURE: 130 MMHG | HEART RATE: 75 BPM | DIASTOLIC BLOOD PRESSURE: 66 MMHG | WEIGHT: 137.4 LBS | TEMPERATURE: 98.4 F | OXYGEN SATURATION: 97 % | HEIGHT: 66 IN | BODY MASS INDEX: 22.08 KG/M2 | RESPIRATION RATE: 18 BRPM

## 2017-04-25 DIAGNOSIS — C90.00 MULTIPLE MYELOMA NOT HAVING ACHIEVED REMISSION (H): Primary | ICD-10-CM

## 2017-04-25 DIAGNOSIS — C90.01 MULTIPLE MYELOMA IN REMISSION (H): ICD-10-CM

## 2017-04-25 LAB
BASOPHILS # BLD AUTO: 0 10E9/L (ref 0–0.2)
BASOPHILS NFR BLD AUTO: 0 %
CREAT SERPL-MCNC: 0.61 MG/DL (ref 0.52–1.04)
DIFFERENTIAL METHOD BLD: ABNORMAL
EOSINOPHIL # BLD AUTO: 0 10E9/L (ref 0–0.7)
EOSINOPHIL NFR BLD AUTO: 0.7 %
ERYTHROCYTE [DISTWIDTH] IN BLOOD BY AUTOMATED COUNT: 15.4 % (ref 10–15)
GFR SERPL CREATININE-BSD FRML MDRD: NORMAL ML/MIN/1.7M2
HCT VFR BLD AUTO: 33.9 % (ref 35–47)
HGB BLD-MCNC: 11.3 G/DL (ref 11.7–15.7)
IMM GRANULOCYTES # BLD: 0 10E9/L (ref 0–0.4)
IMM GRANULOCYTES NFR BLD: 0.2 %
LYMPHOCYTES # BLD AUTO: 0.9 10E9/L (ref 0.8–5.3)
LYMPHOCYTES NFR BLD AUTO: 21.1 %
MCH RBC QN AUTO: 33.7 PG (ref 26.5–33)
MCHC RBC AUTO-ENTMCNC: 33.3 G/DL (ref 31.5–36.5)
MCV RBC AUTO: 101 FL (ref 78–100)
MONOCYTES # BLD AUTO: 0.4 10E9/L (ref 0–1.3)
MONOCYTES NFR BLD AUTO: 9.4 %
NEUTROPHILS # BLD AUTO: 2.9 10E9/L (ref 1.6–8.3)
NEUTROPHILS NFR BLD AUTO: 68.6 %
NRBC # BLD AUTO: 0 10*3/UL
NRBC BLD AUTO-RTO: 0 /100
PLATELET # BLD AUTO: 115 10E9/L (ref 150–450)
RBC # BLD AUTO: 3.35 10E12/L (ref 3.8–5.2)
WBC # BLD AUTO: 4.3 10E9/L (ref 4–11)

## 2017-04-25 PROCEDURE — 36415 COLL VENOUS BLD VENIPUNCTURE: CPT

## 2017-04-25 PROCEDURE — 25000128 H RX IP 250 OP 636: Mod: JW | Performed by: INTERNAL MEDICINE

## 2017-04-25 PROCEDURE — 96401 CHEMO ANTI-NEOPL SQ/IM: CPT

## 2017-04-25 PROCEDURE — 85025 COMPLETE CBC W/AUTO DIFF WBC: CPT | Performed by: INTERNAL MEDICINE

## 2017-04-25 PROCEDURE — 82565 ASSAY OF CREATININE: CPT

## 2017-04-25 PROCEDURE — 99211 OFF/OP EST MAY X REQ PHY/QHP: CPT | Mod: 25

## 2017-04-25 PROCEDURE — 99214 OFFICE O/P EST MOD 30 MIN: CPT | Performed by: INTERNAL MEDICINE

## 2017-04-25 RX ORDER — ALBUTEROL SULFATE 0.83 MG/ML
2.5 SOLUTION RESPIRATORY (INHALATION)
Status: CANCELLED | OUTPATIENT
Start: 2017-05-30

## 2017-04-25 RX ORDER — MEPERIDINE HYDROCHLORIDE 50 MG/ML
25 INJECTION INTRAMUSCULAR; INTRAVENOUS; SUBCUTANEOUS EVERY 30 MIN PRN
Status: CANCELLED | OUTPATIENT
Start: 2017-05-16

## 2017-04-25 RX ORDER — EPINEPHRINE 1 MG/ML
0.3 INJECTION INTRAMUSCULAR; INTRAVENOUS; SUBCUTANEOUS EVERY 5 MIN PRN
Status: CANCELLED | OUTPATIENT
Start: 2017-06-06

## 2017-04-25 RX ORDER — LORAZEPAM 2 MG/ML
0.5 INJECTION INTRAMUSCULAR EVERY 4 HOURS PRN
Status: CANCELLED
Start: 2017-06-06

## 2017-04-25 RX ORDER — EPINEPHRINE 0.3 MG/.3ML
0.3 INJECTION SUBCUTANEOUS EVERY 5 MIN PRN
Status: CANCELLED | OUTPATIENT
Start: 2017-05-23

## 2017-04-25 RX ORDER — METHYLPREDNISOLONE SODIUM SUCCINATE 125 MG/2ML
125 INJECTION, POWDER, LYOPHILIZED, FOR SOLUTION INTRAMUSCULAR; INTRAVENOUS
Status: CANCELLED
Start: 2017-05-23

## 2017-04-25 RX ORDER — DIPHENHYDRAMINE HYDROCHLORIDE 50 MG/ML
50 INJECTION INTRAMUSCULAR; INTRAVENOUS
Status: CANCELLED
Start: 2017-05-30

## 2017-04-25 RX ORDER — DEXAMETHASONE 4 MG/1
20 TABLET ORAL WEEKLY
Qty: 20 TABLET | Refills: 0 | Status: SHIPPED | OUTPATIENT
Start: 2017-04-25 | End: 2017-04-25

## 2017-04-25 RX ORDER — EPINEPHRINE 0.3 MG/.3ML
0.3 INJECTION SUBCUTANEOUS EVERY 5 MIN PRN
Status: CANCELLED | OUTPATIENT
Start: 2017-06-06

## 2017-04-25 RX ORDER — MEPERIDINE HYDROCHLORIDE 50 MG/ML
25 INJECTION INTRAMUSCULAR; INTRAVENOUS; SUBCUTANEOUS EVERY 30 MIN PRN
Status: CANCELLED | OUTPATIENT
Start: 2017-06-06

## 2017-04-25 RX ORDER — MEPERIDINE HYDROCHLORIDE 50 MG/ML
25 INJECTION INTRAMUSCULAR; INTRAVENOUS; SUBCUTANEOUS EVERY 30 MIN PRN
Status: CANCELLED | OUTPATIENT
Start: 2017-05-23

## 2017-04-25 RX ORDER — METHYLPREDNISOLONE SODIUM SUCCINATE 125 MG/2ML
125 INJECTION, POWDER, LYOPHILIZED, FOR SOLUTION INTRAMUSCULAR; INTRAVENOUS
Status: CANCELLED
Start: 2017-05-16

## 2017-04-25 RX ORDER — SODIUM CHLORIDE 9 MG/ML
1000 INJECTION, SOLUTION INTRAVENOUS CONTINUOUS PRN
Status: CANCELLED
Start: 2017-05-30

## 2017-04-25 RX ORDER — ALBUTEROL SULFATE 90 UG/1
1-2 AEROSOL, METERED RESPIRATORY (INHALATION)
Status: CANCELLED
Start: 2017-05-30

## 2017-04-25 RX ORDER — ALBUTEROL SULFATE 0.83 MG/ML
2.5 SOLUTION RESPIRATORY (INHALATION)
Status: CANCELLED | OUTPATIENT
Start: 2017-06-06

## 2017-04-25 RX ORDER — EPINEPHRINE 1 MG/ML
0.3 INJECTION INTRAMUSCULAR; INTRAVENOUS; SUBCUTANEOUS EVERY 5 MIN PRN
Status: CANCELLED | OUTPATIENT
Start: 2017-05-23

## 2017-04-25 RX ORDER — EPINEPHRINE 1 MG/ML
0.3 INJECTION INTRAMUSCULAR; INTRAVENOUS; SUBCUTANEOUS EVERY 5 MIN PRN
Status: CANCELLED | OUTPATIENT
Start: 2017-05-16

## 2017-04-25 RX ORDER — ALBUTEROL SULFATE 90 UG/1
1-2 AEROSOL, METERED RESPIRATORY (INHALATION)
Status: CANCELLED
Start: 2017-06-06

## 2017-04-25 RX ORDER — LORAZEPAM 2 MG/ML
0.5 INJECTION INTRAMUSCULAR EVERY 4 HOURS PRN
Status: CANCELLED
Start: 2017-05-23

## 2017-04-25 RX ORDER — METHYLPREDNISOLONE SODIUM SUCCINATE 125 MG/2ML
125 INJECTION, POWDER, LYOPHILIZED, FOR SOLUTION INTRAMUSCULAR; INTRAVENOUS
Status: CANCELLED
Start: 2017-06-06

## 2017-04-25 RX ORDER — SODIUM CHLORIDE 9 MG/ML
1000 INJECTION, SOLUTION INTRAVENOUS CONTINUOUS PRN
Status: CANCELLED
Start: 2017-06-06

## 2017-04-25 RX ORDER — DIPHENHYDRAMINE HYDROCHLORIDE 50 MG/ML
50 INJECTION INTRAMUSCULAR; INTRAVENOUS
Status: CANCELLED
Start: 2017-06-06

## 2017-04-25 RX ORDER — EPINEPHRINE 0.3 MG/.3ML
0.3 INJECTION SUBCUTANEOUS EVERY 5 MIN PRN
Status: CANCELLED | OUTPATIENT
Start: 2017-05-30

## 2017-04-25 RX ORDER — LORAZEPAM 2 MG/ML
0.5 INJECTION INTRAMUSCULAR EVERY 4 HOURS PRN
Status: CANCELLED
Start: 2017-05-30

## 2017-04-25 RX ORDER — DIPHENHYDRAMINE HYDROCHLORIDE 50 MG/ML
50 INJECTION INTRAMUSCULAR; INTRAVENOUS
Status: CANCELLED
Start: 2017-05-23

## 2017-04-25 RX ORDER — SODIUM CHLORIDE 9 MG/ML
1000 INJECTION, SOLUTION INTRAVENOUS CONTINUOUS PRN
Status: CANCELLED
Start: 2017-05-23

## 2017-04-25 RX ORDER — MEPERIDINE HYDROCHLORIDE 50 MG/ML
25 INJECTION INTRAMUSCULAR; INTRAVENOUS; SUBCUTANEOUS EVERY 30 MIN PRN
Status: CANCELLED | OUTPATIENT
Start: 2017-05-30

## 2017-04-25 RX ORDER — EPINEPHRINE 1 MG/ML
0.3 INJECTION INTRAMUSCULAR; INTRAVENOUS; SUBCUTANEOUS EVERY 5 MIN PRN
Status: CANCELLED | OUTPATIENT
Start: 2017-05-30

## 2017-04-25 RX ORDER — OXYCODONE HYDROCHLORIDE 15 MG/1
15 TABLET ORAL EVERY 4 HOURS PRN
Qty: 30 TABLET | Refills: 0 | Status: SHIPPED | OUTPATIENT
Start: 2017-04-25 | End: 2017-05-23

## 2017-04-25 RX ORDER — SODIUM CHLORIDE 9 MG/ML
1000 INJECTION, SOLUTION INTRAVENOUS CONTINUOUS PRN
Status: CANCELLED
Start: 2017-05-16

## 2017-04-25 RX ORDER — LORAZEPAM 2 MG/ML
0.5 INJECTION INTRAMUSCULAR EVERY 4 HOURS PRN
Status: CANCELLED
Start: 2017-05-16

## 2017-04-25 RX ORDER — DIPHENHYDRAMINE HYDROCHLORIDE 50 MG/ML
50 INJECTION INTRAMUSCULAR; INTRAVENOUS
Status: CANCELLED
Start: 2017-05-16

## 2017-04-25 RX ORDER — METHYLPREDNISOLONE SODIUM SUCCINATE 125 MG/2ML
125 INJECTION, POWDER, LYOPHILIZED, FOR SOLUTION INTRAMUSCULAR; INTRAVENOUS
Status: CANCELLED
Start: 2017-05-30

## 2017-04-25 RX ORDER — ALBUTEROL SULFATE 0.83 MG/ML
2.5 SOLUTION RESPIRATORY (INHALATION)
Status: CANCELLED | OUTPATIENT
Start: 2017-05-23

## 2017-04-25 RX ORDER — ALBUTEROL SULFATE 0.83 MG/ML
2.5 SOLUTION RESPIRATORY (INHALATION)
Status: CANCELLED | OUTPATIENT
Start: 2017-05-16

## 2017-04-25 RX ORDER — DEXAMETHASONE 4 MG/1
20 TABLET ORAL WEEKLY
Qty: 20 TABLET | Refills: 0 | Status: SHIPPED | OUTPATIENT
Start: 2017-04-25 | End: 2017-05-23

## 2017-04-25 RX ORDER — EPINEPHRINE 0.3 MG/.3ML
0.3 INJECTION SUBCUTANEOUS EVERY 5 MIN PRN
Status: CANCELLED | OUTPATIENT
Start: 2017-05-16

## 2017-04-25 RX ORDER — ALBUTEROL SULFATE 90 UG/1
1-2 AEROSOL, METERED RESPIRATORY (INHALATION)
Status: CANCELLED
Start: 2017-05-23

## 2017-04-25 RX ORDER — ALBUTEROL SULFATE 90 UG/1
1-2 AEROSOL, METERED RESPIRATORY (INHALATION)
Status: CANCELLED
Start: 2017-05-16

## 2017-04-25 RX ADMIN — BORTEZOMIB 2.1 MG: 3.5 INJECTION, POWDER, LYOPHILIZED, FOR SOLUTION INTRAVENOUS; SUBCUTANEOUS at 15:12

## 2017-04-25 ASSESSMENT — PAIN SCALES - GENERAL: PAINLEVEL: SEVERE PAIN (6)

## 2017-04-25 NOTE — PROGRESS NOTES
"Amira Arreola is a 84 year old female who presents for:  Chief Complaint   Patient presents with     Oncology Clinic Visit     MGUS        Initial Vitals:  /66 (BP Location: Left arm, Patient Position: Chair, Cuff Size: Adult Regular)  Pulse 75  Temp 98.4  F (36.9  C) (Oral)  Resp 18  Wt 62.3 kg (137 lb 6.4 oz)  SpO2 97%  BMI 22.19 kg/m2 Estimated body mass index is 22.19 kg/(m^2) as calculated from the following:    Height as of 4/18/17: 1.676 m (5' 5.98\").    Weight as of this encounter: 62.3 kg (137 lb 6.4 oz).. Body surface area is 1.7 meters squared. BP completed using cuff size: regular  Severe Pain (6) No LMP recorded. Patient is postmenopausal. Allergies and medications reviewed.     Medications: MEDICATION REFILLS NEEDED TODAY. OXYCODONE  Pharmacy name entered into [facility]: [facility] [address] [place] [address] 7 AT [facility] [address]    Comments: Follow-Up MGUS    5 minutes for nursing intake (face to face time)   Angélica Jones MA      DISCHARGE PLAN:    Continue treatment/ reported to [facility] infusion  Will schedule weekly velcade per springboard dates and an MD follow up  I called [facility] about her oxycodone RX.. ( Dose 15 mg IR q4hrs prn) Last filled 3/29/17 by Dr. Ramos [facility])  Dr. Roberts updated.        Next appointments: See patient instruction section  Departure Mode: Ambulatory  Accompanied by: self  5 minutes for nursing discharge (face to face time)   Tameka Daigle RN      "

## 2017-04-25 NOTE — PROGRESS NOTES
Infusion Nursing Note:  Amira Arreola presents today for C2D8 Velcade.    Patient seen by provider today: Yes: Dr. Roberts   present during visit today: Not Applicable.    Note: N/A.    Intravenous Access:  Lab draw site Right AC, Needle type Butterfly, Gauge 23.  Labs drawn without difficulty.    Treatment Conditions:  Lab Results   Component Value Date    HGB 11.3 04/25/2017     Lab Results   Component Value Date    WBC 4.3 04/25/2017      Lab Results   Component Value Date    ANEU 2.9 04/25/2017     Lab Results   Component Value Date     04/25/2017      Lab Results   Component Value Date     10/12/2016                   Lab Results   Component Value Date    POTASSIUM 4.4 10/12/2016           No results found for: MAG         Lab Results   Component Value Date    CR 0.61 04/25/2017                   Lab Results   Component Value Date    BRADLEY 9.0 10/12/2016                Lab Results   Component Value Date    BILITOTAL 0.5 04/18/2017           Lab Results   Component Value Date    ALBUMIN 3.3 04/18/2017                    Lab Results   Component Value Date    ALT 25 04/18/2017           Lab Results   Component Value Date    AST 22 04/18/2017     Results reviewed, labs MET treatment parameters, ok to proceed with treatment.      Post Infusion Assessment:  Patient tolerated injection without incident.  Site patent and intact, free from redness, edema or discomfort.  No evidence of extravasations.    Discharge Plan:   Copy of AVS reviewed with patient and/or family.  Patient will return 5/2/17 for next appointment.  Patient discharged in stable condition accompanied by: self.  Departure Mode: Ambulatory.    Yesi Cates RN

## 2017-04-25 NOTE — MR AVS SNAPSHOT
After Visit Summary   4/25/2017    Amira Arreola    MRN: [id number]           Patient Information     Date Of Birth          7/17/1932        Visit Information        Provider Department      4/25/2017 2:00 PM Shayne Roberts MD [facility]        Today's Diagnoses     Multiple myeloma not having achieved remission (H)    -  1      Care Instructions    Continue chemotherapy.  FU in 1 month.        Follow-ups after your visit        Your next 10 appointments already scheduled     May 02, 2017 11:00 AM CDT   Level 2 with SH INFUSION CHAIR 2   [facility] and [facility]fusion [facility] ([facility])    [facility]  [address]  [place] [address]   [phone]            May 09, 2017 11:30 AM CDT   Level 2 with SH INFUSION CHAIR 10   [facility] and Infusion Center ([facility])    [facility]  [address]  [place] [address]   [phone]            May 12, 2017  9:30 AM CDT   Anticoagulation Visit with EC ANTICOAGULATION CLINIC   [facility] ([address] [address]   [phone]            May 16, 2017 11:30 AM CDT   Level 2 with  INFUSION CHAIR 11   [facility] ([facility])    [facility]  [address]  [place] [address]   [phone]            May 23, 2017 11:30 AM CDT   Level 2 with  INFUSION CHAIR 16   [facility] ([facility])    [facility]  [address]  [place] [address]   [phone]            May 23, 2017  1:00 PM CDT   Return Visit with Shayne Roberts MD   [facility]    [facility]  [address]  [place] [address]   [phone]          "     Who to contact     If you have questions or need follow up information about today's clinic visit or your schedule please contact [facility] directly at [phone].  Normal or non-critical lab and imaging results will be communicated to you by MyChart, letter or phone within 4 business days after the clinic has received the results. If you do not hear from us within 7 days, please contact the clinic through [employer] or phone. If you have a critical or abnormal lab result, we will notify you by phone as soon as possible.  Submit refill requests through [employer] or call your pharmacy and they will forward the refill request to us. Please allow 3 business days for your refill to be completed.          Additional Information About Your Visit        [employer] Information     [employer] lets you send messages to your doctor, view your test results, renew your prescriptions, schedule appointments and more. To sign up, go to [email or web address] . Click on \"Log in\" on the left side of the screen, which will take you to the Welcome page. Then click on \"Sign up Now\" on the right side of the page.     You will be asked to enter the access code listed below, as well as some personal information. Please follow the directions to create your username and password.     Your access code is: [email or web address]  Expires: 2017 11:09 AM     Your access code will  in 90 days. If you need help or a new code, please call your [place] clinic or [phone].        Care EveryWhere ID     This is your Care EveryWhere ID. This could be used by other organizations to access your [place] medical records  [id number]        Your Vitals Were     Pulse Temperature Respirations Pulse Oximetry BMI (Body Mass Index)       75 98.4  F (36.9  C) (Oral) 18 97% 22.19 kg/m2        Blood Pressure from Last 3 Encounters:   17 130/66   17 130/66   17 140/72    Weight from Last 3 Encounters:   17 62.3 kg (137 lb " 6.4 oz)   04/18/17 61 kg (134 lb 6.4 oz)   04/11/17 58.6 kg (129 lb 3.2 oz)              Today, you had the following     No orders found for display         Today's Medication Changes          These changes are accurate as of: 4/25/17  2:39 PM.  If you have any questions, ask your nurse or doctor.               These medicines have changed or have updated prescriptions.        Dose/Directions    * dexamethasone 4 MG tablet   Commonly known as:  DECADRON   This may have changed:  Another medication with the same name was added. Make sure you understand how and when to take each.   Used for:  Multiple myeloma not having achieved remission (H)        Dose:  20 mg   Take 5 tablets (20 mg) by mouth once a week   Quantity:  20 tablet   Refills:  0       * dexamethasone 4 MG tablet   Commonly known as:  DECADRON   This may have changed:  You were already taking a medication with the same name, and this prescription was added. Make sure you understand how and when to take each.   Used for:  Multiple myeloma not having achieved remission (H)        Dose:  20 mg   Take 5 tablets (20 mg) by mouth once a week   Quantity:  20 tablet   Refills:  0       * Notice:  This list has 2 medication(s) that are the same as other medications prescribed for you. Read the directions carefully, and ask your doctor or other care provider to review them with you.         Where to get your medicines      These medications were sent to [facility] [facility], [place] - [address] AT [facility] [address]  [address], [facility] [address]     Phone:  [phone]     dexamethasone 4 MG tablet                Primary Care Provider Office Phone # Fax #    Addy Frias [phone]       [facility] [address] [facility] [address]  [facility] [address]        Thank you!     Thank you for choosing [facility]  for your care. Our goal is always to provide you with excellent care. Hearing back  from our patients is one way we can continue to improve our services. Please take a few minutes to complete the written survey that you may receive in the mail after your visit with us. Thank you!             Your Updated Medication List - Protect others around you: Learn how to safely use, store and throw away your medicines at www.disposemymeds.org.          This list is accurate as of: 4/25/17  2:39 PM.  Always use your most recent med list.                   Brand Name Dispense Instructions for use    acyclovir 400 MG tablet    ZOVIRAX    60 tablet    Take 1 tablet (400 mg) by mouth 2 times daily Viral Prophylaxis.       ASPIRIN NOT PRESCRIBED    INTENTIONAL    0 each    Antiplatelet medication not prescribed intentionally due to Current anticoagulant therapy (warfarin/enoxaparin)       * ATIVAN 0.5 MG tablet   Generic drug:  LORazepam     10 tablet    1 hour prior to MRI take 1 tablet (0.5 mg) and may repeat x's 1.       * LORazepam 0.5 MG tablet    ATIVAN    30 tablet    Take 1 tablet (0.5 mg) by mouth every 4 hours as needed (Anxiety, Nausea/Vomiting or Sleep)       CALCIUM CITRATE + PO      Take 2,000 mg by mouth 2 tabs       carboxymethylcellulose 0.5 % Soln ophthalmic solution    REFRESH PLUS     1 drop 4 times daily       CLARINEX PO      Taking claritin       * COMPAZINE PO      Take 10 mg by mouth       * prochlorperazine 10 MG tablet    COMPAZINE    30 tablet    Take 1 tablet (10 mg) by mouth every 6 hours as needed (Nausea/Vomiting)       cycloSPORINE 0.05 % ophthalmic emulsion    RESTASIS     Place 1 drop into both eyes every 12 hours       DAILY MULTIVITAMIN PO      Take 1 tablet by mouth daily.       * dexamethasone 4 MG tablet    DECADRON    20 tablet    Take 5 tablets (20 mg) by mouth once a week       * dexamethasone 4 MG tablet    DECADRON    20 tablet    Take 5 tablets (20 mg) by mouth once a week       erythromycin ophthalmic ointment    ROMYCIN     Place 1 Application into both eyes At  Bedtime       GENTLE STOOL SOFTENER PO      Take 100 mg by mouth daily       metoprolol 50 MG 24 hr tablet    TOPROL-XL    180 tablet    Take 1 tablet (50 mg) by mouth 2 times daily       OXYCODONE HCL PO      Take by mouth as needed       polyethylene glycol powder    MIRALAX/GLYCOLAX     Take 1 capful by mouth daily       timolol 0.25 % ophthalmic solution    TIMOPTIC     1 drop every morning       TYLENOL PO      Take 500 mg by mouth every 6 hours as needed for mild pain or fever       UNABLE TO FIND      MEDICATION NAME: Fresh Coat eye drops       VITAMIN D3 PO      Take 1,000 Units by mouth daily       warfarin 4 MG tablet    COUMADIN    90 tablet    Takes 4 mg on Tues, Sat,  6 mg all other days or as directed by the [facility].       ZOMETA IV      Inject into the vein every 30 days Every 3 month dosing       * Notice:  This list has 6 medication(s) that are the same as other medications prescribed for you. Read the directions carefully, and ask your doctor or other care provider to review them with you.

## 2017-04-25 NOTE — MR AVS SNAPSHOT
After Visit Summary   4/25/2017    Amira Arreola    MRN: [id number]           Patient Information     Date Of Birth          7/17/1932        Visit Information        Provider Department      4/25/2017 1:00 PM  INFUSION CHAIR 17 [facility]        Today's Diagnoses     Multiple myeloma not having achieved remission (H)    -  1       Follow-ups after your visit        Your next 10 appointments already scheduled     May 02, 2017 11:00 AM CDT   Level 2 with SH INFUSION CHAIR 2   [facility] ([facility])    [facility]  [address]  [place] [address]   [phone]            May 09, 2017 11:30 AM CDT   Level 2 with SH INFUSION CHAIR 10   [facility] ([facility])    [facility]  [address]  [place] [address]   [phone]            May 12, 2017  9:30 AM CDT   Anticoagulation Visit with [facility]   [address] [address]   [phone]            May 16, 2017 11:30 AM CDT   Level 2 with  INFUSION CHAIR 11   [facility] ([facility])    [facility]  [address]  [place] [address]   [phone]            May 23, 2017 11:30 AM CDT   Level 2 with  INFUSION CHAIR 16   [facility] ([facility])    [facility]  [address]  [place] [address]   [phone]            May 23, 2017  1:00 PM CDT   Return Visit with Shayne Roberts MD   [facility] ([facility])    [facility]  [address]  [place] [address]   [phone]              Who to contact     If you have  "questions or need follow up information about today's clinic visit or your schedule please contact [facility] directly at [phone].  Normal or non-critical lab and imaging results will be communicated to you by enGreethart, letter or phone within 4 business days after the clinic has received the results. If you do not hear from us within 7 days, please contact the clinic through enGreethart or phone. If you have a critical or abnormal lab result, we will notify you by phone as soon as possible.  Submit refill requests through Filmzu or call your pharmacy and they will forward the refill request to us. Please allow 3 business days for your refill to be completed.          Additional Information About Your Visit        enGreetharYogaTrail Information     Filmzu lets you send messages to your doctor, view your test results, renew your prescriptions, schedule appointments and more. To sign up, go to [email or web address] . Click on \"Log in\" on the left side of the screen, which will take you to the Welcome page. Then click on \"Sign up Now\" on the right side of the page.     You will be asked to enter the access code listed below, as well as some personal information. Please follow the directions to create your username and password.     Your access code is: [email or web address]  Expires: 2017 11:09 AM     Your access code will  in 90 days. If you need help or a new code, please call your [place] clinic or [phone].        Care EveryWhere ID     This is your Care EveryWhere ID. This could be used by other organizations to access your [place] medical records  [id number]        Your Vitals Were     Pulse Temperature Respirations Height Pulse Oximetry BMI (Body Mass Index)    75 98.4  F (36.9  C) (Oral) 18 1.676 m (5' 5.98\") 97% 22.19 kg/m2       Blood Pressure from Last 3 Encounters:   17 130/66   17 130/66   17 140/72    Weight from Last 3 Encounters:   17 62.3 kg " (137 lb 6.4 oz)   04/25/17 62.3 kg (137 lb 6.4 oz)   04/18/17 61 kg (134 lb 6.4 oz)              We Performed the Following     CBC with platelets differential     Nursing Communication 1     Treatment Conditions          Today's Medication Changes          These changes are accurate as of: 4/25/17  3:16 PM.  If you have any questions, ask your nurse or doctor.               These medicines have changed or have updated prescriptions.        Dose/Directions    * dexamethasone 4 MG tablet   Commonly known as:  DECADRON   This may have changed:  Another medication with the same name was added. Make sure you understand how and when to take each.   Used for:  Multiple myeloma not having achieved remission (H)        Dose:  20 mg   Take 5 tablets (20 mg) by mouth once a week   Quantity:  20 tablet   Refills:  0       * dexamethasone 4 MG tablet   Commonly known as:  DECADRON   This may have changed:  You were already taking a medication with the same name, and this prescription was added. Make sure you understand how and when to take each.   Used for:  Multiple myeloma not having achieved remission (H)        Dose:  20 mg   Take 5 tablets (20 mg) by mouth once a week   Quantity:  20 tablet   Refills:  0       * OXYCODONE HCL PO   This may have changed:  Another medication with the same name was added. Make sure you understand how and when to take each.   Changed by:  Addy Frias MD        Take by mouth as needed   Refills:  0       * oxyCODONE 15 MG IR tablet   Commonly known as:  ROXICODONE   This may have changed:  You were already taking a medication with the same name, and this prescription was added. Make sure you understand how and when to take each.   Used for:  Multiple myeloma not having achieved remission (H)   Changed by:  Shayne Roberts MD        Dose:  15 mg   Take 1 tablet (15 mg) by mouth every 4 hours as needed for pain maximum 4 tablet(s) per day   Quantity:  30 tablet   Refills:  0       *  Notice:  This list has 4 medication(s) that are the same as other medications prescribed for you. Read the directions carefully, and ask your doctor or other care provider to review them with you.         Where to get your medicines      These medications were sent to [id number] - [place], [place] - [address] AT [facility] [address]  [address], [place] [address]     Phone:  [phone]     dexamethasone 4 MG tablet         Some of these will need a paper prescription and others can be bought over the counter.  Ask your nurse if you have questions.     Bring a paper prescription for each of these medications     oxyCODONE 15 MG IR tablet                Primary Care Provider Office Phone # Fax #    Addy Frias [phone]       [facility] [address]  [place] [address]        Thank you!     Thank you for choosing [facility]  for your care. Our goal is always to provide you with excellent care. Hearing back from our patients is one way we can continue to improve our services. Please take a few minutes to complete the written survey that you may receive in the mail after your visit with us. Thank you!             Your Updated Medication List - Protect others around you: Learn how to safely use, store and throw away your medicines at www.disposemymeds.org.          This list is accurate as of: 4/25/17  3:16 PM.  Always use your most recent med list.                   Brand Name Dispense Instructions for use    acyclovir 400 MG tablet    ZOVIRAX    60 tablet    Take 1 tablet (400 mg) by mouth 2 times daily Viral Prophylaxis.       ASPIRIN NOT PRESCRIBED    INTENTIONAL    0 each    Antiplatelet medication not prescribed intentionally due to Current anticoagulant therapy (warfarin/enoxaparin)       * ATIVAN 0.5 MG tablet   Generic drug:  LORazepam     10 tablet    1 hour prior to MRI take 1 tablet (0.5 mg) and  may repeat x's 1.       * LORazepam 0.5 MG tablet    ATIVAN    30 tablet    Take 1 tablet (0.5 mg) by mouth every 4 hours as needed (Anxiety, Nausea/Vomiting or Sleep)       CALCIUM CITRATE + PO      Take 2,000 mg by mouth 2 tabs       carboxymethylcellulose 0.5 % Soln ophthalmic solution    REFRESH PLUS     1 drop 4 times daily       CLARINEX PO      Taking claritin       * COMPAZINE PO      Take 10 mg by mouth       * prochlorperazine 10 MG tablet    COMPAZINE    30 tablet    Take 1 tablet (10 mg) by mouth every 6 hours as needed (Nausea/Vomiting)       cycloSPORINE 0.05 % ophthalmic emulsion    RESTASIS     Place 1 drop into both eyes every 12 hours       DAILY MULTIVITAMIN PO      Take 1 tablet by mouth daily.       * dexamethasone 4 MG tablet    DECADRON    20 tablet    Take 5 tablets (20 mg) by mouth once a week       * dexamethasone 4 MG tablet    DECADRON    20 tablet    Take 5 tablets (20 mg) by mouth once a week       erythromycin ophthalmic ointment    ROMYCIN     Place 1 Application into both eyes At Bedtime       GENTLE STOOL SOFTENER PO      Take 100 mg by mouth daily       metoprolol 50 MG 24 hr tablet    TOPROL-XL    180 tablet    Take 1 tablet (50 mg) by mouth 2 times daily       * OXYCODONE HCL PO      Take by mouth as needed       * oxyCODONE 15 MG IR tablet    ROXICODONE    30 tablet    Take 1 tablet (15 mg) by mouth every 4 hours as needed for pain maximum 4 tablet(s) per day       polyethylene glycol powder    MIRALAX/GLYCOLAX     Take 1 capful by mouth daily       timolol 0.25 % ophthalmic solution    TIMOPTIC     1 drop every morning       TYLENOL PO      Take 500 mg by mouth every 6 hours as needed for mild pain or fever       UNABLE TO FIND      MEDICATION NAME: Fresh Coat eye drops       VITAMIN D3 PO      Take 1,000 Units by mouth daily       warfarin 4 MG tablet    COUMADIN    90 tablet    Takes 4 mg on Tues, Sat,  6 mg all other days or as directed by the ACC.       ZOMETA IV       Inject into the vein every 30 days Every 3 month dosing       * Notice:  This list has 8 medication(s) that are the same as other medications prescribed for you. Read the directions carefully, and ask your doctor or other care provider to review them with you.

## 2017-04-26 NOTE — PROGRESS NOTES
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     1.  She had work-up for mild thrombocytopenia.  On 09/12/2013, platelets of 155.  On 09/16/2014, platelet of 141.      2. On 09/21/2015:  - White count 4.2, hemoglobin 13.2 and platelets of 138.  MCV of 101.    -Chemistry panel normal except mildly low protein of 6.4.    3.  On 09/29/2015:      - Folate of more than 24.      - Vitamin B12 of more than 719.    - Serum protein electrophoresis did not reveal any monoclonal spike.  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    4.  On 10/02/2015:  -SPEP does not reveal any M-spike. JANET does not reveal any monoclonal protein.     -IgG, IgA and IgM level are decreased. IgG of 484, IgA of 21 and IgM of 15  5.  On 10/22/2015:    - TSH normal at 1.84.    - Urine immunofixation reveals monoclonal free immunoglobulin of light chain of kappa light chain type.    6. A 24-hour urine protein electrophoresis on 11/02/2015 revealed monoclonal peak of 4.7%.  Urine immunofixation revealed free kappa light chain.   7. Skeletal survey on 05/23/2016 does not reveal any lytic lesion.    8. Bone marrow aspiration and biopsy on 05/25/2016 40-50% kappa light chain restricted plasma cells.  Cytogenetics is normal. 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    9. MRI of bones on 06/21/2016 and 06/22/2016 reveals myeloma lesions.  10. Revlimid and dexamethasone started on 08/24/2016. She was started on revlimid 25 mg 3 weeks on and 1 week off along with dexamethasone 20 mg weekly. Due to cytopenia, dose was subsequently reduced to 15 mg a day. Treatment in between had to be delayed because of cytopenia. She did not have any significant response to treatment.   11.Velcade and dexamethasone started on 03/21/2017.       SUBJECTIVE:    Ms. Amira Arreola is an 84-year-old female with  kappa light chain multiple myeloma, standard risk.  She is currently on Velcade and dexamethasone since 2017.  She is responding to it.  On 3/21/2017 kappa light chain was 52.5.  On 2017, it is 41.75.      Overall she is doing good.  She has some fatigue.  No headache.  No dizziness.  No neck pain.  No chest pain or difficulty breathing.  No abdominal pain, nausea or vomiting.  Appetite is fairly good.  No fever or chills.  She has some joint pain, especially in the right knee.  She takes oxycodone, which helps.  She wants a refill.      PHYSICAL EXAMINATION:   GENERAL:  She was alert and oriented x3.   VITAL SIGNS:  Reviewed.   Rest of the system not examined.      LABORATORY DATA:  Reviewed.      ASSESSMENT:   1.  An 84-year-old female with kappa light chain multiple myeloma on Velcade and dexamethasone which she is tolerating.  Disease is responding.   2.  Joint pain from myeloma and arthritis.   3.  Mild anemia and thrombocytopenia which is stable.      PLAN:   1.  Labs were all reviewed.  She was happy to know that myeloma is responding. She will continue on Velcade and dexamethasone.  I told the patient that in future we will consider adding daratumumab.  So far she is tolerating treatment well. She is mildly anemic and thrombocytopenic. It is stable.   2.  She is on Zometa every 3 months, which will be continued.   3.  Prescription for oxycodone refilled.   4.  I will see her in a month's time for followup.  She will call if she has any problem in between.      Total time spent 25 minutes, most of the time was spent in counseling.         ITZ LOPEZ MD             D: 2017 18:38   T: 2017 03:53   MT: leandro      Name:     ALBERTO PARSON   MRN:      [id number]        Account:      [id number]   :      1932           Visit Date:   2017      Document: [id number]

## 2017-05-02 ENCOUNTER — HOSPITAL ENCOUNTER (OUTPATIENT)
Facility: CLINIC | Age: 82
Setting detail: SPECIMEN
Discharge: HOME | End: 2017-05-02
Attending: INTERNAL MEDICINE | Admitting: INTERNAL MEDICINE
Payer: MEDICARE

## 2017-05-02 ENCOUNTER — INFUSION THERAPY VISIT (OUTPATIENT)
Dept: INFUSION THERAPY | Facility: CLINIC | Age: 82
End: 2017-05-02
Attending: INTERNAL MEDICINE
Payer: MEDICARE

## 2017-05-02 VITALS
SYSTOLIC BLOOD PRESSURE: 123 MMHG | HEART RATE: 60 BPM | TEMPERATURE: 97.6 F | DIASTOLIC BLOOD PRESSURE: 74 MMHG | RESPIRATION RATE: 18 BRPM

## 2017-05-02 DIAGNOSIS — C90.00 MULTIPLE MYELOMA NOT HAVING ACHIEVED REMISSION (H): Primary | ICD-10-CM

## 2017-05-02 LAB
BASOPHILS # BLD AUTO: 0 10E9/L (ref 0–0.2)
BASOPHILS NFR BLD AUTO: 0 %
DIFFERENTIAL METHOD BLD: ABNORMAL
EOSINOPHIL # BLD AUTO: 0 10E9/L (ref 0–0.7)
EOSINOPHIL NFR BLD AUTO: 1 %
ERYTHROCYTE [DISTWIDTH] IN BLOOD BY AUTOMATED COUNT: 15.7 % (ref 10–15)
HCT VFR BLD AUTO: 36 % (ref 35–47)
HGB BLD-MCNC: 11.8 G/DL (ref 11.7–15.7)
IMM GRANULOCYTES # BLD: 0 10E9/L (ref 0–0.4)
IMM GRANULOCYTES NFR BLD: 0.3 %
LYMPHOCYTES # BLD AUTO: 1.1 10E9/L (ref 0.8–5.3)
LYMPHOCYTES NFR BLD AUTO: 26.3 %
MCH RBC QN AUTO: 33.4 PG (ref 26.5–33)
MCHC RBC AUTO-ENTMCNC: 32.8 G/DL (ref 31.5–36.5)
MCV RBC AUTO: 102 FL (ref 78–100)
MONOCYTES # BLD AUTO: 0.4 10E9/L (ref 0–1.3)
MONOCYTES NFR BLD AUTO: 8.8 %
NEUTROPHILS # BLD AUTO: 2.6 10E9/L (ref 1.6–8.3)
NEUTROPHILS NFR BLD AUTO: 63.6 %
NRBC # BLD AUTO: 0 10*3/UL
NRBC BLD AUTO-RTO: 0 /100
PLATELET # BLD AUTO: 119 10E9/L (ref 150–450)
RBC # BLD AUTO: 3.53 10E12/L (ref 3.8–5.2)
WBC # BLD AUTO: 4 10E9/L (ref 4–11)

## 2017-05-02 PROCEDURE — 25000128 H RX IP 250 OP 636: Mod: JW | Performed by: INTERNAL MEDICINE

## 2017-05-02 PROCEDURE — 85025 COMPLETE CBC W/AUTO DIFF WBC: CPT | Performed by: INTERNAL MEDICINE

## 2017-05-02 PROCEDURE — 96401 CHEMO ANTI-NEOPL SQ/IM: CPT

## 2017-05-02 RX ADMIN — BORTEZOMIB 2.1 MG: 3.5 INJECTION, POWDER, LYOPHILIZED, FOR SOLUTION INTRAVENOUS; SUBCUTANEOUS at 12:48

## 2017-05-02 NOTE — PROGRESS NOTES
Infusion Nursing Note:  Amira Arreola presents today for velcade.    Patient seen by provider today: No   present during visit today: Not Applicable.    Note: N/A.    Intravenous Access:  Lab draw site right AC, Needle type BF, Gauge 23.  Labs drawn without difficulty.    Treatment Conditions:  Lab Results   Component Value Date    HGB 11.8 05/02/2017     Lab Results   Component Value Date    WBC 4.0 05/02/2017      Lab Results   Component Value Date    ANEU 2.6 05/02/2017     Lab Results   Component Value Date     05/02/2017      Results reviewed, labs MET treatment parameters, ok to proceed with treatment.      Post Infusion Assessment:  Patient tolerated injection without incident.  Site patent and intact, free from redness, edema or discomfort.    Discharge Plan:   Copy of AVS reviewed with patient and/or family. Patient discharged in stable condition accompanied by: self.  Departure Mode: Ambulatory.    Mera Johnson RN

## 2017-05-02 NOTE — MR AVS SNAPSHOT
After Visit Summary   5/2/2017    Amira Arreola    MRN: [id number]           Patient Information     Date Of Birth          7/17/1932        Visit Information        Provider Department      5/2/2017 11:00 AM  INFUSION CHAIR 2 [facility]        Today's Diagnoses     Multiple myeloma not having achieved remission (H)    -  1       Follow-ups after your visit        Your next 10 appointments already scheduled     May 09, 2017 11:30 AM CDT   Level 2 with  INFUSION CHAIR 10   [facility] ([facility])    [facility]  [address]  [place] [address]   [phone]            May 12, 2017  9:30 AM CDT   Anticoagulation Visit with [facility] ANTICOAGULATION CLINIC   [address] [address]   [phone]            May 16, 2017 11:30 AM CDT   Level 2 with  INFUSION CHAIR 11   [facility] ([facility])    [facility]  [address]  [place] [address]   [phone]            May 23, 2017 11:30 AM CDT   Level 2 with  INFUSION CHAIR 16   [facility] ([facility])    [facility] [place]  [address]  [place] [address]   [phone]            May 23, 2017  1:00 PM CDT   Return Visit with Shayne Roberts MD   [facility] ([facility])    [facility]  [address]  [place] [address]   [phone]              Who to contact     If you have questions or need follow up information about today's clinic visit or your schedule please contact [facility] directly at [phone].  Normal or non-critical lab and imaging results will be communicated to you by  "MyChart, letter or phone within 4 business days after the clinic has received the results. If you do not hear from us within 7 days, please contact the clinic through Terabitzhart or phone. If you have a critical or abnormal lab result, we will notify you by phone as soon as possible.  Submit refill requests through Mobile Game Day or call your pharmacy and they will forward the refill request to us. Please allow 3 business days for your refill to be completed.          Additional Information About Your Visit        TerabitzharQuality Systems Information     Mobile Game Day lets you send messages to your doctor, view your test results, renew your prescriptions, schedule appointments and more. To sign up, go to [email or web address] . Click on \"Log in\" on the left side of the screen, which will take you to the Welcome page. Then click on \"Sign up Now\" on the right side of the page.     You will be asked to enter the access code listed below, as well as some personal information. Please follow the directions to create your username and password.     Your access code is: [email or web address]  Expires: 2017 11:09 AM     Your access code will  in 90 days. If you need help or a new code, please call your [facility] clinic or [phone].        Care EveryWhere ID     This is your Care EveryWhere ID. This could be used by other organizations to access your [facility] medical records  [id number]        Your Vitals Were     Pulse Temperature Respirations             60 97.6  F (36.4  C) (Oral) 18          Blood Pressure from Last 3 Encounters:   17 123/74   17 130/66   17 130/66    Weight from Last 3 Encounters:   17 62.3 kg (137 lb 6.4 oz)   17 62.3 kg (137 lb 6.4 oz)   17 61 kg (134 lb 6.4 oz)              We Performed the Following     CBC with platelets differential     Nursing Communication 1     Treatment Conditions        Primary Care Provider Office Phone # Fax #    Addy Frias [phone] "       [facility] [address] [facility] [address]  [facility] [address]        Thank you!     Thank you for choosing [facility]  for your care. Our goal is always to provide you with excellent care. Hearing back from our patients is one way we can continue to improve our services. Please take a few minutes to complete the written survey that you may receive in the mail after your visit with us. Thank you!             Your Updated Medication List - Protect others around you: Learn how to safely use, store and throw away your medicines at www.disposemymeds.org.          This list is accurate as of: 5/2/17  1:00 PM.  Always use your most recent med list.                   Brand Name Dispense Instructions for use    acyclovir 400 MG tablet    ZOVIRAX    60 tablet    Take 1 tablet (400 mg) by mouth 2 times daily Viral Prophylaxis.       ASPIRIN NOT PRESCRIBED    INTENTIONAL    0 each    Antiplatelet medication not prescribed intentionally due to Current anticoagulant therapy (warfarin/enoxaparin)       * ATIVAN 0.5 MG tablet   Generic drug:  LORazepam     10 tablet    1 hour prior to MRI take 1 tablet (0.5 mg) and may repeat x's 1.       * LORazepam 0.5 MG tablet    ATIVAN    30 tablet    Take 1 tablet (0.5 mg) by mouth every 4 hours as needed (Anxiety, Nausea/Vomiting or Sleep)       CALCIUM CITRATE + PO      Take 2,000 mg by mouth 2 tabs       carboxymethylcellulose 0.5 % Soln ophthalmic solution    REFRESH PLUS     1 drop 4 times daily       CLARINEX PO      Taking claritin       * COMPAZINE PO      Take 10 mg by mouth       * prochlorperazine 10 MG tablet    COMPAZINE    30 tablet    Take 1 tablet (10 mg) by mouth every 6 hours as needed (Nausea/Vomiting)       cycloSPORINE 0.05 % ophthalmic emulsion    RESTASIS     Place 1 drop into both eyes every 12 hours       DAILY MULTIVITAMIN PO      Take 1 tablet by mouth daily.       * dexamethasone 4 MG tablet    DECADRON    20 tablet     Take 5 tablets (20 mg) by mouth once a week       * dexamethasone 4 MG tablet    DECADRON    20 tablet    Take 5 tablets (20 mg) by mouth once a week       erythromycin ophthalmic ointment    ROMYCIN     Place 1 Application into both eyes At Bedtime       GENTLE STOOL SOFTENER PO      Take 100 mg by mouth daily       metoprolol 50 MG 24 hr tablet    TOPROL-XL    180 tablet    Take 1 tablet (50 mg) by mouth 2 times daily       * OXYCODONE HCL PO      Take by mouth as needed       * oxyCODONE 15 MG IR tablet    ROXICODONE    30 tablet    Take 1 tablet (15 mg) by mouth every 4 hours as needed for pain maximum 4 tablet(s) per day       polyethylene glycol powder    MIRALAX/GLYCOLAX     Take 1 capful by mouth daily       timolol 0.25 % ophthalmic solution    TIMOPTIC     1 drop every morning       TYLENOL PO      Take 500 mg by mouth every 6 hours as needed for mild pain or fever       UNABLE TO FIND      MEDICATION NAME: Fresh Coat eye drops       VITAMIN D3 PO      Take 1,000 Units by mouth daily       warfarin 4 MG tablet    COUMADIN    90 tablet    Takes 4 mg on Tues, Sat,  6 mg all other days or as directed by the ACC.       ZOMETA IV      Inject into the vein every 30 days Every 3 month dosing       * Notice:  This list has 8 medication(s) that are the same as other medications prescribed for you. Read the directions carefully, and ask your doctor or other care provider to review them with you.

## 2017-05-04 DIAGNOSIS — Z79.01 LONG-TERM (CURRENT) USE OF ANTICOAGULANTS: ICD-10-CM

## 2017-05-04 DIAGNOSIS — I48.0 PAROXYSMAL ATRIAL FIBRILLATION (H): ICD-10-CM

## 2017-05-04 RX ORDER — WARFARIN SODIUM 4 MG/1
TABLET ORAL
Qty: 120 TABLET | Refills: 0 | Status: SHIPPED | OUTPATIENT
Start: 2017-05-04 | End: 2017-07-31

## 2017-05-04 NOTE — TELEPHONE ENCOUNTER
Prescription approved per [facility] Refill Protocol.  Keyla Justice RN      warfarin (COUMADIN) 4 MG tablet    Last Written Prescription Date: 2/24/17  Last Fill Qty: 90, # refills: 0  Last Office Visit with [facility], [facility] or [facility] prescribing provider: 12/22/16  Next 5 appointments (look out 90 days)     May 23, 2017  1:00 PM CDT   Return Visit with Shayne Roberts MD   [facility] ([facility])    [facility]  [address] [facility] [address]  [facility] [address]   [phone]                   Date and Result of Last PT/INR:   Lab Results   Component Value Date    INR 2.5 04/21/2017    INR 2.7 04/07/2017

## 2017-05-09 ENCOUNTER — HOSPITAL ENCOUNTER (OUTPATIENT)
Facility: CLINIC | Age: 82
Setting detail: SPECIMEN
Discharge: HOME | End: 2017-05-09
Attending: INTERNAL MEDICINE | Admitting: INTERNAL MEDICINE
Payer: MEDICARE

## 2017-05-09 ENCOUNTER — INFUSION THERAPY VISIT (OUTPATIENT)
Dept: INFUSION THERAPY | Facility: CLINIC | Age: 82
End: 2017-05-09
Attending: INTERNAL MEDICINE
Payer: MEDICARE

## 2017-05-09 VITALS
DIASTOLIC BLOOD PRESSURE: 83 MMHG | BODY MASS INDEX: 22.14 KG/M2 | HEART RATE: 74 BPM | OXYGEN SATURATION: 96 % | RESPIRATION RATE: 18 BRPM | TEMPERATURE: 98.1 F | WEIGHT: 137.8 LBS | HEIGHT: 66 IN | SYSTOLIC BLOOD PRESSURE: 143 MMHG

## 2017-05-09 DIAGNOSIS — C90.00 MULTIPLE MYELOMA NOT HAVING ACHIEVED REMISSION (H): Primary | ICD-10-CM

## 2017-05-09 DIAGNOSIS — C90.01 MULTIPLE MYELOMA IN REMISSION (H): ICD-10-CM

## 2017-05-09 LAB
BASOPHILS # BLD AUTO: 0 10E9/L (ref 0–0.2)
BASOPHILS NFR BLD AUTO: 0.2 %
CREAT SERPL-MCNC: 0.56 MG/DL (ref 0.52–1.04)
DIFFERENTIAL METHOD BLD: ABNORMAL
EOSINOPHIL # BLD AUTO: 0.1 10E9/L (ref 0–0.7)
EOSINOPHIL NFR BLD AUTO: 1.3 %
ERYTHROCYTE [DISTWIDTH] IN BLOOD BY AUTOMATED COUNT: 15.4 % (ref 10–15)
GFR SERPL CREATININE-BSD FRML MDRD: NORMAL ML/MIN/1.7M2
HCT VFR BLD AUTO: 34.1 % (ref 35–47)
HGB BLD-MCNC: 11.5 G/DL (ref 11.7–15.7)
IMM GRANULOCYTES # BLD: 0 10E9/L (ref 0–0.4)
IMM GRANULOCYTES NFR BLD: 0.2 %
LYMPHOCYTES # BLD AUTO: 0.8 10E9/L (ref 0.8–5.3)
LYMPHOCYTES NFR BLD AUTO: 18.4 %
MCH RBC QN AUTO: 34.2 PG (ref 26.5–33)
MCHC RBC AUTO-ENTMCNC: 33.7 G/DL (ref 31.5–36.5)
MCV RBC AUTO: 102 FL (ref 78–100)
MONOCYTES # BLD AUTO: 0.4 10E9/L (ref 0–1.3)
MONOCYTES NFR BLD AUTO: 8.6 %
NEUTROPHILS # BLD AUTO: 3.2 10E9/L (ref 1.6–8.3)
NEUTROPHILS NFR BLD AUTO: 71.3 %
NRBC # BLD AUTO: 0 10*3/UL
NRBC BLD AUTO-RTO: 0 /100
PLATELET # BLD AUTO: 137 10E9/L (ref 150–450)
RBC # BLD AUTO: 3.36 10E12/L (ref 3.8–5.2)
WBC # BLD AUTO: 4.5 10E9/L (ref 4–11)

## 2017-05-09 PROCEDURE — 96402 CHEMO HORMON ANTINEOPL SQ/IM: CPT

## 2017-05-09 PROCEDURE — 25000128 H RX IP 250 OP 636: Performed by: INTERNAL MEDICINE

## 2017-05-09 PROCEDURE — 85025 COMPLETE CBC W/AUTO DIFF WBC: CPT | Performed by: INTERNAL MEDICINE

## 2017-05-09 PROCEDURE — 96401 CHEMO ANTI-NEOPL SQ/IM: CPT

## 2017-05-09 PROCEDURE — 82565 ASSAY OF CREATININE: CPT

## 2017-05-09 RX ADMIN — BORTEZOMIB 2.1 MG: 3.5 INJECTION, POWDER, LYOPHILIZED, FOR SOLUTION INTRAVENOUS; SUBCUTANEOUS at 13:02

## 2017-05-09 NOTE — MR AVS SNAPSHOT
After Visit Summary   5/9/2017    Amira Arreola    MRN: [id number]           Patient Information     Date Of Birth          7/17/1932        Visit Information        Provider Department      5/9/2017 11:30 AM  INFUSION CHAIR 10 [facility]        Today's Diagnoses     Multiple myeloma not having achieved remission (H)    -  1    Multiple myeloma in remission (H)           Follow-ups after your visit        Your next 10 appointments already scheduled     May 12, 2017  9:30 AM CDT   Anticoagulation Visit with [facility] ANTICOAGULATION CLINIC   [facility] ([address] [address]   [phone]            May 16, 2017 11:30 AM CDT   Level 2 with  INFUSION CHAIR 11   [facility] ([facility])    [facility]  [address]  [place] [address]   [phone]            May 23, 2017 11:30 AM CDT   Level 2 with  INFUSION CHAIR 16   [facility] ([facility])    [facility]  [address]  [place] [address] [phone]            May 23, 2017  1:00 PM CDT   Return Visit with Shayne Roberts MD   [facility] ([facility])    [facility]  [address]  [facility] [address] [phone]              Who to contact     If you have questions or need follow up information about today's clinic visit or your schedule please contact [facility] directly at [phone].  Normal or non-critical lab and imaging results will be communicated to you by MyChart, letter or phone within 4 business days after the clinic has received the results. If you do not hear from us within 7 days, please contact the clinic through MyChart or phone. If you have a critical or  "abnormal lab result, we will notify you by phone as soon as possible.  Submit refill requests through [employer] or call your pharmacy and they will forward the refill request to us. Please allow 3 business days for your refill to be completed.          Additional Information About Your Visit        Wow! [employer]uff[employer] Information     [employer] lets you send messages to your doctor, view your test results, renew your prescriptions, schedule appointments and more. To sign up, go to [email or web address] . Click on \"Log in\" on the left side of the screen, which will take you to the Welcome page. Then click on \"Sign up Now\" on the right side of the page.     You will be asked to enter the access code listed below, as well as some personal information. Please follow the directions to create your username and password.     Your access code is: [email or web address]  Expires: 2017 11:09 AM     Your access code will  in 90 days. If you need help or a new code, please call your [place] clinic or [phone].        Care EveryWhere ID     This is your Care EveryWhere ID. This could be used by other organizations to access your [place] medical records  [id number]        Your Vitals Were     Pulse Temperature Respirations Height Pulse Oximetry BMI (Body Mass Index)    74 98.1  F (36.7  C) (Oral) 18 1.676 m (5' 5.98\") 96% 22.25 kg/m2       Blood Pressure from Last 3 Encounters:   17 143/83   17 123/74   17 130/66    Weight from Last 3 Encounters:   17 62.5 kg (137 lb 12.8 oz)   17 62.3 kg (137 lb 6.4 oz)   17 62.3 kg (137 lb 6.4 oz)              We Performed the Following     CBC with platelets differential     Creatinine     Nursing Communication 1     Treatment Conditions        Primary Care Provider Office Phone # Fax #    Addy Frias [phone]       [facility] [address]  [place] [address]        Thank you!     Thank you for choosing " [facility] AND [facility]  for your care. Our goal is always to provide you with excellent care. Hearing back from our patients is one way we can continue to improve our services. Please take a few minutes to complete the written survey that you may receive in the mail after your visit with us. Thank you!             Your Updated Medication List - Protect others around you: Learn how to safely use, store and throw away your medicines at www.disposemymeds.org.          This list is accurate as of: 5/9/17  1:06 PM.  Always use your most recent med list.                   Brand Name Dispense Instructions for use    acyclovir 400 MG tablet    ZOVIRAX    60 tablet    Take 1 tablet (400 mg) by mouth 2 times daily Viral Prophylaxis.       ASPIRIN NOT PRESCRIBED    INTENTIONAL    0 each    Antiplatelet medication not prescribed intentionally due to Current anticoagulant therapy (warfarin/enoxaparin)       * ATIVAN 0.5 MG tablet   Generic drug:  LORazepam     10 tablet    1 hour prior to MRI take 1 tablet (0.5 mg) and may repeat x's 1.       * LORazepam 0.5 MG tablet    ATIVAN    30 tablet    Take 1 tablet (0.5 mg) by mouth every 4 hours as needed (Anxiety, Nausea/Vomiting or Sleep)       CALCIUM CITRATE + PO      Take 2,000 mg by mouth 2 tabs       carboxymethylcellulose 0.5 % Soln ophthalmic solution    REFRESH PLUS     1 drop 4 times daily       CLARINEX PO      Taking claritin       * COMPAZINE PO      Take 10 mg by mouth       * prochlorperazine 10 MG tablet    COMPAZINE    30 tablet    Take 1 tablet (10 mg) by mouth every 6 hours as needed (Nausea/Vomiting)       cycloSPORINE 0.05 % ophthalmic emulsion    RESTASIS     Place 1 drop into both eyes every 12 hours       DAILY MULTIVITAMIN PO      Take 1 tablet by mouth daily.       * dexamethasone 4 MG tablet    DECADRON    20 tablet    Take 5 tablets (20 mg) by mouth once a week       * dexamethasone 4 MG tablet    DECADRON    20 tablet    Take 5 tablets  (20 mg) by mouth once a week       erythromycin ophthalmic ointment    ROMYCIN     Place 1 Application into both eyes At Bedtime       GENTLE STOOL SOFTENER PO      Take 100 mg by mouth daily       metoprolol 50 MG 24 hr tablet    TOPROL-XL    180 tablet    Take 1 tablet (50 mg) by mouth 2 times daily       * OXYCODONE HCL PO      Take by mouth as needed       * oxyCODONE 15 MG IR tablet    ROXICODONE    30 tablet    Take 1 tablet (15 mg) by mouth every 4 hours as needed for pain maximum 4 tablet(s) per day       polyethylene glycol powder    MIRALAX/GLYCOLAX     Take 1 capful by mouth daily       timolol 0.25 % ophthalmic solution    TIMOPTIC     1 drop every morning       TYLENOL PO      Take 500 mg by mouth every 6 hours as needed for mild pain or fever       UNABLE TO FIND      MEDICATION NAME: Fresh Coat eye drops       VITAMIN D3 PO      Take 1,000 Units by mouth daily       warfarin 4 MG tablet    COUMADIN    120 tablet    Take 6 mg on Mon, Wed, Fri; 4 mg all other days or as directed by INR clinic.       ZOMETA IV      Inject into the vein every 30 days Every 3 month dosing       * Notice:  This list has 8 medication(s) that are the same as other medications prescribed for you. Read the directions carefully, and ask your doctor or other care provider to review them with you.

## 2017-05-09 NOTE — PROGRESS NOTES
Infusion Nursing Note:  Amira Arreola presents today for C2D22 velcade.    Patient seen by provider today: No   present during visit today: Not Applicable.    Note: N/A.    Intravenous Access:  Lab draw site rac, Needle type bf, Gauge 23.  Labs drawn without difficulty.    Treatment Conditions:  Lab Results   Component Value Date    HGB 11.5 05/09/2017     Lab Results   Component Value Date    WBC 4.5 05/09/2017      Lab Results   Component Value Date    ANEU 3.2 05/09/2017     Lab Results   Component Value Date     05/09/2017      Lab Results   Component Value Date     10/12/2016                   Lab Results   Component Value Date    POTASSIUM 4.4 10/12/2016           No results found for: MAG         Lab Results   Component Value Date    CR 0.56 05/09/2017                   Lab Results   Component Value Date    BRADLEY 9.0 10/12/2016                Lab Results   Component Value Date    BILITOTAL 0.5 04/18/2017           Lab Results   Component Value Date    ALBUMIN 3.3 04/18/2017                    Lab Results   Component Value Date    ALT 25 04/18/2017           Lab Results   Component Value Date    AST 22 04/18/2017     Results reviewed, labs MET treatment parameters, ok to proceed with treatment.      Post Infusion Assessment:  Patient tolerated injection without incident.  Site patent and intact, free from redness, edema or discomfort.    Discharge Plan:   [facility]  Patient will return 5/16/17 for next appointment.   Patient discharged in stable condition accompanied by: self.  Departure Mode: Ambulatory.    Jamari Gamboa RN

## 2017-05-12 ENCOUNTER — ANTICOAGULATION THERAPY VISIT (OUTPATIENT)
Dept: NURSING | Facility: CLINIC | Age: 82
End: 2017-05-12
Payer: COMMERCIAL

## 2017-05-12 DIAGNOSIS — Z79.01 LONG-TERM (CURRENT) USE OF ANTICOAGULANTS: ICD-10-CM

## 2017-05-12 LAB — INR POINT OF CARE: 2.3 (ref 0.86–1.14)

## 2017-05-12 PROCEDURE — 36416 COLLJ CAPILLARY BLOOD SPEC: CPT

## 2017-05-12 PROCEDURE — 85610 PROTHROMBIN TIME: CPT | Mod: QW

## 2017-05-12 NOTE — MR AVS SNAPSHOT
Amira IVY Arreola   5/12/2017 9:30 AM   Anticoagulation Therapy Visit    Description:  84 year old female   Provider:   ANTICOAGULATION CLINIC   Department:  Ec Nurse           INR as of 5/12/2017     Today's INR 2.3      Anticoagulation Summary as of 5/12/2017     INR goal 2.0-3.0   Today's INR 2.3   Full instructions 6 mg on Mon, Wed, Fri; 4 mg all other days   Next INR check 6/2/2017    Indications   Long-term (current) use of anticoagulants [Z79.01] [Z79.01]  Atrial fibrillation (H) [I48.91] (Resolved) [I48.91]         Description     2.3 today.  Continue with 6 mg Mon, Wed, Fri;  4 mg all other days.  Recheck in 3 weeks.         Your next Anticoagulation Clinic appointment(s)     Jun 02, 2017  9:30 AM CDT   Anticoagulation Visit with  ANTICOAGULATION CLINIC   [facility] ([facility])    [address] [place] [id number] [phone]              Contact Numbers     Clinic Number:         May 2017 Details    Sun Mon Tue Wed Thu Fri Sat      1               2               3               4               5               6                 7               8               9               10               11               12      6 mg   See details      13      4 mg           14      4 mg         15      6 mg         16      4 mg         17      6 mg         18      4 mg         19      6 mg         20      4 mg           21      4 mg         22      6 mg         23      4 mg         24      6 mg         25      4 mg         26      6 mg         27      4 mg           28      4 mg         29      6 mg         30      4 mg         31      6 mg             Date Details   05/12 This INR check               How to take your warfarin dose     To take:  4 mg Take 1 of the 4 mg tablets.    To take:  6 mg Take 1.5 of the 4 mg tablets.           June 2017 Details    Sun Mon Tue Wed Thu Fri Sat         1      4 mg         2            3                 4                5               6               7               8               9               10                 11               12               13               14               15               16               17                 18               19               20               21               22               23               24                 25               26               27               28               29               30                 Date Details   No additional details    Date of next INR:  6/2/2017         How to take your warfarin dose     To take:  4 mg Take 1 of the 4 mg tablets.    To take:  6 mg Take 1.5 of the 4 mg tablets.

## 2017-05-12 NOTE — PROGRESS NOTES
ANTICOAGULATION FOLLOW-UP CLINIC VISIT    Patient Name:  Amira Arreola  Date:  5/12/2017  Contact Type:  Face to Face    SUBJECTIVE:     Patient Findings     Positives No Problem Findings           OBJECTIVE    INR Protime   Date Value Ref Range Status   05/12/2017 2.3 (A) 0.86 - 1.14 Final       ASSESSMENT / PLAN  INR assessment THER    Recheck INR In: 3 WEEKS    INR Location Clinic      Anticoagulation Summary as of 5/12/2017     INR goal 2.0-3.0   Today's INR 2.3   Maintenance plan 6 mg (4 mg x 1.5) on Mon, Wed, Fri; 4 mg (4 mg x 1) all other days   Full instructions 6 mg on Mon, Wed, Fri; 4 mg all other days   Weekly total 34 mg   Plan last modified Dayana Barrera RN (3/10/2017)   Next INR check 6/2/2017   Target end date Indefinite    Indications   Long-term (current) use of anticoagulants [Z79.01] [Z79.01]  Atrial fibrillation (H) [I48.91] (Resolved) [I48.91]         Anticoagulation Episode Summary     INR check location     Preferred lab     Send INR reminders to [facility]    Comments PATIENT TAKES WARFARIN IN THE MORNING      Anticoagulation Care Providers     Provider Role Specialty Phone number    Addy Frias MD Responsible Internal Medicine [phone]            See the Encounter Report to view Anticoagulation Flowsheet and Dosing Calendar (Go to Encounters tab in chart review, and find the Anticoagulation Therapy Visit)    Dosage adjustment made based on physician directed care plan.    2.3 today.  Continue with 6 mg Mon, Wed, Fri;  4 mg all other days.  Recheck in 3 weeks.     Dayana Barrera RN

## 2017-05-16 ENCOUNTER — HOSPITAL ENCOUNTER (OUTPATIENT)
Facility: CLINIC | Age: 82
Setting detail: SPECIMEN
Discharge: HOME | End: 2017-05-16
Attending: INTERNAL MEDICINE | Admitting: INTERNAL MEDICINE
Payer: MEDICARE

## 2017-05-16 ENCOUNTER — INFUSION THERAPY VISIT (OUTPATIENT)
Dept: INFUSION THERAPY | Facility: CLINIC | Age: 82
End: 2017-05-16
Attending: INTERNAL MEDICINE
Payer: MEDICARE

## 2017-05-16 DIAGNOSIS — D47.2 MGUS (MONOCLONAL GAMMOPATHY OF UNKNOWN SIGNIFICANCE): Primary | ICD-10-CM

## 2017-05-16 DIAGNOSIS — C90.00 MULTIPLE MYELOMA NOT HAVING ACHIEVED REMISSION (H): ICD-10-CM

## 2017-05-16 LAB
ALBUMIN SERPL-MCNC: 3.5 G/DL (ref 3.4–5)
ALP SERPL-CCNC: 41 U/L (ref 40–150)
ALT SERPL W P-5'-P-CCNC: 28 U/L (ref 0–50)
ANION GAP SERPL CALCULATED.3IONS-SCNC: 6 MMOL/L (ref 3–14)
AST SERPL W P-5'-P-CCNC: 27 U/L (ref 0–45)
BASOPHILS # BLD AUTO: 0 10E9/L (ref 0–0.2)
BASOPHILS NFR BLD AUTO: 0 %
BILIRUB DIRECT SERPL-MCNC: 0.1 MG/DL (ref 0–0.2)
BILIRUB SERPL-MCNC: 0.6 MG/DL (ref 0.2–1.3)
BUN SERPL-MCNC: 16 MG/DL (ref 7–30)
CALCIUM SERPL-MCNC: 9.1 MG/DL (ref 8.5–10.1)
CHLORIDE SERPL-SCNC: 106 MMOL/L (ref 94–109)
CO2 SERPL-SCNC: 26 MMOL/L (ref 20–32)
CREAT SERPL-MCNC: 0.58 MG/DL (ref 0.52–1.04)
DIFFERENTIAL METHOD BLD: NORMAL
EOSINOPHIL # BLD AUTO: 0 10E9/L (ref 0–0.7)
EOSINOPHIL NFR BLD AUTO: 1 %
ERYTHROCYTE [DISTWIDTH] IN BLOOD BY AUTOMATED COUNT: 14.9 % (ref 10–15)
GFR SERPL CREATININE-BSD FRML MDRD: ABNORMAL ML/MIN/1.7M2
GLUCOSE SERPL-MCNC: 117 MG/DL (ref 70–99)
HCT VFR BLD AUTO: 35.6 % (ref 35–47)
HGB BLD-MCNC: 12 G/DL (ref 11.7–15.7)
IMM GRANULOCYTES # BLD: 0 10E9/L (ref 0–0.4)
IMM GRANULOCYTES NFR BLD: 0.2 %
LYMPHOCYTES # BLD AUTO: 1 10E9/L (ref 0.8–5.3)
LYMPHOCYTES NFR BLD AUTO: 23.8 %
MCH RBC QN AUTO: 34.1 PG (ref 26.5–33)
MCHC RBC AUTO-ENTMCNC: 33.7 G/DL (ref 31.5–36.5)
MCV RBC AUTO: 101 FL (ref 78–100)
MONOCYTES # BLD AUTO: 0.4 10E9/L (ref 0–1.3)
MONOCYTES NFR BLD AUTO: 8.8 %
NEUTROPHILS # BLD AUTO: 2.8 10E9/L (ref 1.6–8.3)
NEUTROPHILS NFR BLD AUTO: 66.2 %
PLATELET # BLD AUTO: 119 10E9/L (ref 150–450)
POTASSIUM SERPL-SCNC: 4.1 MMOL/L (ref 3.4–5.3)
PROT SERPL-MCNC: 6.1 G/DL (ref 6.8–8.8)
RBC # BLD AUTO: 3.52 10E12/L (ref 3.8–5.2)
SODIUM SERPL-SCNC: 138 MMOL/L (ref 133–144)
WBC # BLD AUTO: 4.2 10E9/L (ref 4–11)

## 2017-05-16 PROCEDURE — 83883 ASSAY NEPHELOMETRY NOT SPEC: CPT | Performed by: INTERNAL MEDICINE

## 2017-05-16 PROCEDURE — 85027 COMPLETE CBC AUTOMATED: CPT | Performed by: INTERNAL MEDICINE

## 2017-05-16 PROCEDURE — 00000402 ZZHCL STATISTIC TOTAL PROTEIN: Performed by: INTERNAL MEDICINE

## 2017-05-16 PROCEDURE — 85004 AUTOMATED DIFF WBC COUNT: CPT | Performed by: INTERNAL MEDICINE

## 2017-05-16 PROCEDURE — 96401 CHEMO ANTI-NEOPL SQ/IM: CPT

## 2017-05-16 PROCEDURE — 80076 HEPATIC FUNCTION PANEL: CPT | Performed by: INTERNAL MEDICINE

## 2017-05-16 PROCEDURE — 80048 BASIC METABOLIC PNL TOTAL CA: CPT | Performed by: INTERNAL MEDICINE

## 2017-05-16 PROCEDURE — 25000128 H RX IP 250 OP 636: Performed by: INTERNAL MEDICINE

## 2017-05-16 PROCEDURE — 84165 PROTEIN E-PHORESIS SERUM: CPT | Performed by: INTERNAL MEDICINE

## 2017-05-16 RX ORDER — DEXAMETHASONE 4 MG/1
20 TABLET ORAL WEEKLY
Qty: 20 TABLET | Refills: 0 | Status: SHIPPED | OUTPATIENT
Start: 2017-05-16 | End: 2017-05-23

## 2017-05-16 RX ADMIN — BORTEZOMIB 2.1 MG: 3.5 INJECTION, POWDER, LYOPHILIZED, FOR SOLUTION INTRAVENOUS; SUBCUTANEOUS at 12:53

## 2017-05-16 NOTE — MR AVS SNAPSHOT
After Visit Summary   5/16/2017    Amira Arreola    MRN: [id number]           Patient Information     Date Of Birth          7/17/1932        Visit Information        Provider Department      5/16/2017 11:30 AM  INFUSION CHAIR 11 [facility]        Today's Diagnoses     MGUS (monoclonal gammopathy of unknown significance)    -  1    Multiple myeloma not having achieved remission (H)           Follow-ups after your visit        Your next 10 appointments already scheduled     May 23, 2017 11:30 AM CDT   Level 2 with  INFUSION CHAIR 16   [facility] ([facility])    [facility]  [address]  [place] [address]   [phone]            May 23, 2017  1:00 PM CDT   Return Visit with Shayne Roberts MD   [facility] ([facility])    [facility]  [address]  [place] [address]   [phone]            Jun 02, 2017  9:30 AM CDT   Anticoagulation Visit with EC ANTICOAGULATION CLINIC   [facility] ([address] [address] [phone]              Who to contact     If you have questions or need follow up information about today's clinic visit or your schedule please contact [facility] directly at [phone].  Normal or non-critical lab and imaging results will be communicated to you by MyChart, letter or phone within 4 business days after the clinic has received the results. If you do not hear from us within 7 days, please contact the clinic through MyChart or phone. If you have a critical or abnormal lab result, we will notify you by phone as soon as possible.  Submit refill requests through [employer] or call your pharmacy and they will forward the refill request to us. Please allow 3 business days for your refill  "to be completed.          Additional Information About Your Visit        [employer] Information     Office Center lets you send messages to your doctor, view your test results, renew your prescriptions, schedule appointments and more. To sign up, go to [email or web address] . Click on \"Log in\" on the left side of the screen, which will take you to the Welcome page. Then click on \"Sign up Now\" on the right side of the page.     You will be asked to enter the access code listed below, as well as some personal information. Please follow the directions to create your username and password.     Your access code is: [email or web address]  Expires: 2017 11:09 AM     Your access code will  in 90 days. If you need help or a new code, please call your [place] clinic or [phone].        Care EveryWhere ID     This is your Care EveryWhere ID. This could be used by other organizations to access your [place] medical records  [id number]         Blood Pressure from Last 3 Encounters:   17 143/83   17 123/74   17 130/66    Weight from Last 3 Encounters:   17 62.5 kg (137 lb 12.8 oz)   17 62.3 kg (137 lb 6.4 oz)   17 62.3 kg (137 lb 6.4 oz)              We Performed the Following     Basic metabolic panel     CBC with platelets     Hepatic panel     Kappa and lambda light chain     Nursing Communication 1     Protein electrophoresis     Treatment Conditions     WBC Differential          Today's Medication Changes          These changes are accurate as of: 17  1:03 PM.  If you have any questions, ask your nurse or doctor.               These medicines have changed or have updated prescriptions.        Dose/Directions    * dexamethasone 4 MG tablet   Commonly known as:  DECADRON   This may have changed:  Another medication with the same name was added. Make sure you understand how and when to take each.   Used for:  Multiple myeloma not having achieved remission (H)        Dose:  20 mg   Take " 5 tablets (20 mg) by mouth once a week   Quantity:  20 tablet   Refills:  0       * dexamethasone 4 MG tablet   Commonly known as:  DECADRON   This may have changed:  Another medication with the same name was added. Make sure you understand how and when to take each.   Used for:  Multiple myeloma not having achieved remission (H)        Dose:  20 mg   Take 5 tablets (20 mg) by mouth once a week   Quantity:  20 tablet   Refills:  0       * dexamethasone 4 MG tablet   Commonly known as:  DECADRON   This may have changed:  You were already taking a medication with the same name, and this prescription was added. Make sure you understand how and when to take each.   Used for:  Multiple myeloma not having achieved remission (H)        Dose:  20 mg   Take 5 tablets (20 mg) by mouth once a week   Quantity:  20 tablet   Refills:  0       * Notice:  This list has 3 medication(s) that are the same as other medications prescribed for you. Read the directions carefully, and ask your doctor or other care provider to review them with you.         Where to get your medicines      These medications were sent to [id number] - [place] - [address] [facility] [address] AT [facility] [address]  [address], [place] [address]     Phone:  [phone]     dexamethasone 4 MG tablet                Primary Care Provider Office Phone # Fax #    Addy Frias [phone]       [facility] [address] [facility] [address]  [facility] [address]        Thank you!     Thank you for choosing [facility]  for your care. Our goal is always to provide you with excellent care. Hearing back from our patients is one way we can continue to improve our services. Please take a few minutes to complete the written survey that you may receive in the mail after your visit with us. Thank you!             Your Updated Medication List - Protect others around you: Learn how to safely use, store  and throw away your medicines at www.disposemymeds.org.          This list is accurate as of: 5/16/17  1:03 PM.  Always use your most recent med list.                   Brand Name Dispense Instructions for use    acyclovir 400 MG tablet    ZOVIRAX    60 tablet    Take 1 tablet (400 mg) by mouth 2 times daily Viral Prophylaxis.       ASPIRIN NOT PRESCRIBED    INTENTIONAL    0 each    Antiplatelet medication not prescribed intentionally due to Current anticoagulant therapy (warfarin/enoxaparin)       * ATIVAN 0.5 MG tablet   Generic drug:  LORazepam     10 tablet    1 hour prior to MRI take 1 tablet (0.5 mg) and may repeat x's 1.       * LORazepam 0.5 MG tablet    ATIVAN    30 tablet    Take 1 tablet (0.5 mg) by mouth every 4 hours as needed (Anxiety, Nausea/Vomiting or Sleep)       CALCIUM CITRATE + PO      Take 2,000 mg by mouth 2 tabs       carboxymethylcellulose 0.5 % Soln ophthalmic solution    REFRESH PLUS     1 drop 4 times daily       CLARINEX PO      Taking claritin       * COMPAZINE PO      Take 10 mg by mouth       * prochlorperazine 10 MG tablet    COMPAZINE    30 tablet    Take 1 tablet (10 mg) by mouth every 6 hours as needed (Nausea/Vomiting)       cycloSPORINE 0.05 % ophthalmic emulsion    RESTASIS     Place 1 drop into both eyes every 12 hours       DAILY MULTIVITAMIN PO      Take 1 tablet by mouth daily.       * dexamethasone 4 MG tablet    DECADRON    20 tablet    Take 5 tablets (20 mg) by mouth once a week       * dexamethasone 4 MG tablet    DECADRON    20 tablet    Take 5 tablets (20 mg) by mouth once a week       * dexamethasone 4 MG tablet    DECADRON    20 tablet    Take 5 tablets (20 mg) by mouth once a week       erythromycin ophthalmic ointment    ROMYCIN     Place 1 Application into both eyes At Bedtime       GENTLE STOOL SOFTENER PO      Take 100 mg by mouth daily       metoprolol 50 MG 24 hr tablet    TOPROL-XL    180 tablet    Take 1 tablet (50 mg) by mouth 2 times daily       *  OXYCODONE HCL PO      Take by mouth as needed       * oxyCODONE 15 MG IR tablet    ROXICODONE    30 tablet    Take 1 tablet (15 mg) by mouth every 4 hours as needed for pain maximum 4 tablet(s) per day       polyethylene glycol powder    MIRALAX/GLYCOLAX     Take 1 capful by mouth daily       timolol 0.25 % ophthalmic solution    TIMOPTIC     1 drop every morning       TYLENOL PO      Take 500 mg by mouth every 6 hours as needed for mild pain or fever       UNABLE TO FIND      MEDICATION NAME: Fresh Coat eye drops       VITAMIN D3 PO      Take 1,000 Units by mouth daily       warfarin 4 MG tablet    COUMADIN    120 tablet    Take 6 mg on Mon, Wed, Fri; 4 mg all other days or as directed by INR clinic.       ZOMETA IV      Inject into the vein every 30 days Every 3 month dosing       * Notice:  This list has 9 medication(s) that are the same as other medications prescribed for you. Read the directions carefully, and ask your doctor or other care provider to review them with you.

## 2017-05-16 NOTE — PROGRESS NOTES
Infusion Nursing Note:  Amira Arreola presents today for velcade C3D1 .    Patient seen by provider today: No   present during visit today: Not Applicable.    Note: N/A.    Intravenous Access:  Lab draw site right AC, Needle type butterfly, Gauge 23.    Treatment Conditions:  Lab Results   Component Value Date    HGB 12.0 05/16/2017     Lab Results   Component Value Date    WBC 4.2 05/16/2017      Lab Results   Component Value Date    ANEU 2.8 05/16/2017     Lab Results   Component Value Date     05/16/2017      Results reviewed, labs MET treatment parameters, ok to proceed with treatment.      Post Infusion Assessment:  Patient tolerated injection without incident. Dexamethasone script sent to pts pharmacy. Confirmed with them that they received it.    Discharge Plan:   Patient and/or family verbalized understanding of discharge instructions and all questions answered.  Copy of AVS reviewed with patient and/or family.  Patient will return next week for next appointment.  Patient discharged in stable condition accompanied by: self.  Departure Mode: Ambulatory.    Venice Gaspar RN

## 2017-05-17 LAB
ALBUMIN SERPL ELPH-MCNC: 3.8 G/DL (ref 3.7–5.1)
ALPHA1 GLOB SERPL ELPH-MCNC: 0.3 G/DL (ref 0.2–0.4)
ALPHA2 GLOB SERPL ELPH-MCNC: 0.7 G/DL (ref 0.5–0.9)
B-GLOBULIN SERPL ELPH-MCNC: 0.7 G/DL (ref 0.6–1)
GAMMA GLOB SERPL ELPH-MCNC: 0.3 G/DL (ref 0.7–1.6)
KAPPA LC UR-MCNC: 60.75 MG/DL (ref 0.33–1.94)
KAPPA LC/LAMBDA SER: 94.92 {RATIO} (ref 0.26–1.65)
LAMBDA LC SERPL-MCNC: 0.64 MG/DL (ref 0.57–2.63)
M PROTEIN SERPL ELPH-MCNC: 0 G/DL
PROT PATTERN SERPL ELPH-IMP: ABNORMAL

## 2017-05-23 ENCOUNTER — INFUSION THERAPY VISIT (OUTPATIENT)
Dept: INFUSION THERAPY | Facility: CLINIC | Age: 82
End: 2017-05-23
Attending: INTERNAL MEDICINE
Payer: MEDICARE

## 2017-05-23 ENCOUNTER — ONCOLOGY VISIT (OUTPATIENT)
Dept: ONCOLOGY | Facility: CLINIC | Age: 82
End: 2017-05-23
Attending: INTERNAL MEDICINE
Payer: MEDICARE

## 2017-05-23 ENCOUNTER — HOSPITAL ENCOUNTER (OUTPATIENT)
Facility: CLINIC | Age: 82
Setting detail: SPECIMEN
Discharge: HOME | End: 2017-05-23
Attending: INTERNAL MEDICINE | Admitting: INTERNAL MEDICINE
Payer: MEDICARE

## 2017-05-23 VITALS
BODY MASS INDEX: 21.15 KG/M2 | WEIGHT: 131.6 LBS | RESPIRATION RATE: 16 BRPM | HEART RATE: 86 BPM | DIASTOLIC BLOOD PRESSURE: 75 MMHG | TEMPERATURE: 98.2 F | SYSTOLIC BLOOD PRESSURE: 110 MMHG | HEIGHT: 66 IN

## 2017-05-23 VITALS
HEART RATE: 86 BPM | RESPIRATION RATE: 16 BRPM | WEIGHT: 131.6 LBS | HEIGHT: 66 IN | SYSTOLIC BLOOD PRESSURE: 110 MMHG | BODY MASS INDEX: 21.15 KG/M2 | TEMPERATURE: 98.2 F | DIASTOLIC BLOOD PRESSURE: 75 MMHG

## 2017-05-23 DIAGNOSIS — C90.00 MULTIPLE MYELOMA NOT HAVING ACHIEVED REMISSION (H): Primary | ICD-10-CM

## 2017-05-23 DIAGNOSIS — C79.51 CANCER, METASTATIC TO BONE (H): ICD-10-CM

## 2017-05-23 LAB
ALBUMIN SERPL-MCNC: 3.6 G/DL (ref 3.4–5)
BASOPHILS # BLD AUTO: 0 10E9/L (ref 0–0.2)
BASOPHILS NFR BLD AUTO: 0.2 %
CALCIUM SERPL-MCNC: 9.4 MG/DL (ref 8.5–10.1)
CREAT SERPL-MCNC: 0.54 MG/DL (ref 0.52–1.04)
DIFFERENTIAL METHOD BLD: ABNORMAL
EOSINOPHIL # BLD AUTO: 0 10E9/L (ref 0–0.7)
EOSINOPHIL NFR BLD AUTO: 0.8 %
ERYTHROCYTE [DISTWIDTH] IN BLOOD BY AUTOMATED COUNT: 15.1 % (ref 10–15)
GFR SERPL CREATININE-BSD FRML MDRD: NORMAL ML/MIN/1.7M2
HCT VFR BLD AUTO: 38.2 % (ref 35–47)
HGB BLD-MCNC: 12.9 G/DL (ref 11.7–15.7)
IMM GRANULOCYTES # BLD: 0 10E9/L (ref 0–0.4)
IMM GRANULOCYTES NFR BLD: 0.2 %
LYMPHOCYTES # BLD AUTO: 1.4 10E9/L (ref 0.8–5.3)
LYMPHOCYTES NFR BLD AUTO: 27.5 %
MCH RBC QN AUTO: 34.3 PG (ref 26.5–33)
MCHC RBC AUTO-ENTMCNC: 33.8 G/DL (ref 31.5–36.5)
MCV RBC AUTO: 102 FL (ref 78–100)
MONOCYTES # BLD AUTO: 0.4 10E9/L (ref 0–1.3)
MONOCYTES NFR BLD AUTO: 8.2 %
NEUTROPHILS # BLD AUTO: 3.2 10E9/L (ref 1.6–8.3)
NEUTROPHILS NFR BLD AUTO: 63.1 %
NRBC # BLD AUTO: 0 10*3/UL
NRBC BLD AUTO-RTO: 0 /100
PLATELET # BLD AUTO: 158 10E9/L (ref 150–450)
RBC # BLD AUTO: 3.76 10E12/L (ref 3.8–5.2)
WBC # BLD AUTO: 5 10E9/L (ref 4–11)

## 2017-05-23 PROCEDURE — 96374 THER/PROPH/DIAG INJ IV PUSH: CPT

## 2017-05-23 PROCEDURE — 25000128 H RX IP 250 OP 636: Performed by: INTERNAL MEDICINE

## 2017-05-23 PROCEDURE — 82310 ASSAY OF CALCIUM: CPT | Performed by: INTERNAL MEDICINE

## 2017-05-23 PROCEDURE — 96401 CHEMO ANTI-NEOPL SQ/IM: CPT

## 2017-05-23 PROCEDURE — 82040 ASSAY OF SERUM ALBUMIN: CPT | Performed by: INTERNAL MEDICINE

## 2017-05-23 PROCEDURE — 99215 OFFICE O/P EST HI 40 MIN: CPT | Performed by: INTERNAL MEDICINE

## 2017-05-23 PROCEDURE — 85025 COMPLETE CBC W/AUTO DIFF WBC: CPT | Performed by: INTERNAL MEDICINE

## 2017-05-23 PROCEDURE — 99211 OFF/OP EST MAY X REQ PHY/QHP: CPT | Mod: 25

## 2017-05-23 PROCEDURE — 82565 ASSAY OF CREATININE: CPT | Performed by: INTERNAL MEDICINE

## 2017-05-23 RX ORDER — LORAZEPAM 0.5 MG/1
0.5 TABLET ORAL EVERY 8 HOURS PRN
Qty: 20 TABLET | Refills: 3 | Status: SHIPPED | OUTPATIENT
Start: 2017-05-23 | End: 2017-09-13

## 2017-05-23 RX ORDER — OXYCODONE HYDROCHLORIDE 15 MG/1
15 TABLET ORAL EVERY 8 HOURS PRN
Qty: 90 TABLET | Refills: 0 | Status: SHIPPED | OUTPATIENT
Start: 2017-05-23 | End: 2017-06-21

## 2017-05-23 RX ORDER — ZOLEDRONIC ACID 0.04 MG/ML
4 INJECTION, SOLUTION INTRAVENOUS ONCE
Status: COMPLETED | OUTPATIENT
Start: 2017-05-23 | End: 2017-05-23

## 2017-05-23 RX ADMIN — SODIUM CHLORIDE 250 ML: 9 INJECTION, SOLUTION INTRAVENOUS at 12:33

## 2017-05-23 RX ADMIN — ZOLEDRONIC ACID 4 MG: 0.04 INJECTION, SOLUTION INTRAVENOUS at 12:33

## 2017-05-23 RX ADMIN — BORTEZOMIB 2.1 MG: 3.5 INJECTION, POWDER, LYOPHILIZED, FOR SOLUTION INTRAVENOUS; SUBCUTANEOUS at 12:54

## 2017-05-23 ASSESSMENT — PAIN SCALES - GENERAL
PAINLEVEL: NO PAIN (0)
PAINLEVEL: NO PAIN (0)

## 2017-05-23 NOTE — MR AVS SNAPSHOT
After Visit Summary   5/23/2017    Amira Arreola    MRN: [id number]           Patient Information     Date Of Birth          7/17/1932        Visit Information        Provider Department      5/23/2017 1:00 PM Shayne Roberts MD [facility]        Today's Diagnoses     Multiple myeloma not having achieved remission (H)          Care Instructions    Add daratumab to chemo.  Side effect of daratumab.  Bebeto-cath placement.  Follow up in 1 month.      You are scheduled for a Port Placement on Tuesday, May 30, 2017 @ 7:30am  [facility]   Check in at the [facility] Desk @ 6:30am  Nothing to eat or drink after midnight  You will need a   STOP Coumadin           Follow-ups after your visit        Your next 10 appointments already scheduled     May 30, 2017  7:30 AM CDT   IR CHEST PORT PLACEMENT > 5 YRS OF AGE with SHIR1   [facility]iology ([facility])    [address] [place] [id number] [phone]           1. Your doctor will need to do a history and physical within 7 days before this procedure. 2. Your doctor will which medications should not be taken the morning of the exam. 3. Laboratory tests are to be obtained by your doctor prior to the exam (Basic Metabolic Panel, CBCP, PTT and INR) (No labs needed if you are having a tunneled catheter exchange or removal) 4. If you have allergies to x-ray contrast or iodine, contact your doctor or a Radiology nurse prior to the exam day for instructions. 5. Someone will need to drive you to and from the hospital. 6. If you are or may be pregnant, contact your doctor or a Radiology nurse prior to the day of the exam. 7. If you have diabetes, check with your doctor or a Radiology nurse to see if your insulin needs to be adjusted for the exam. 8. If you are taking a medication called Glucophage or Glucovance; these medications need to be held the  day of the exam and for approximately 48 hours following. A blood sample must be drawn so your creatinine level can be checked before resuming this medication. 9. If you are taking Coumadin (to thin you blood) please contact your doctor or a Radiology nurse at least 3 days before the exam for special instructions. 10. You should not have received contrast within 48 hours of this exam. 11. The day before your exam you may eat your regular diet and are encouraged to drink at least 2 quarts of clear liquids. Drink no alcoholic beverages for 24 hours prior to the exam. 12. If you have a colostomy you will need to irrigate it with tap water at 8PM the evening before and again at 6AM the morning of the exam. 13. Do not smoke for 24 hours prior to the procedure. 14. Birth to 4 years: - Breast feeding must be stopped 4 hours prior to exam - Solid food or formula must be stopped 6 hours prior to exam - Tube feedings must be stopped 6 hours prior to exam 15. 4-10 years old: - Nothing to eat or drink 6 hours prior to exam 16. 10+ years old: - Nothing to eat or drink 8 hours prior to exam 17. The morning of the exam you may brush your teeth and take medications as directed with a sip of water. 18. When discharged, you cannot drive until morning, and an adult must be with you until then. You should stay in the [facility] overnight. 19. Bring a list of all drugs you are taking; include supplements and over-the-counter medications. Wear comfortable clothes and leave your valuables at home.            May 31, 2017  8:00 AM CDT   Level 7 with  INFUSION CHAIR 11   [facility] ([facility])    [facility] [place]  [address]  [facility] [address]   [phone]            Jun 02, 2017  9:30 AM CDT   Anticoagulation Visit with  ANTICOAGULATION CLINIC   [facility] [place] ([facility])    [address] [place] [address] Drive  [place]  "[place] [address]   [phone]            Jun 07, 2017  8:00 AM CDT   Level 7 with  INFUSION CHAIR 11   [facility] ([facility])    Critical access hospital [facility] [place]  [address]  [place] [address]   [phone]            Jun 14, 2017  8:30 AM CDT   Level 7 with SH INFUSION CHAIR 12   [facility] ([facility])    Critical access hospital [facility] [place] [address]  [place] [address]   [phone]              Future tests that were ordered for you today     Open Future Orders        Priority Expected Expires Ordered    IR Chest Port Placement > 5 Yrs of Age Routine  5/23/2018 5/23/2017            Who to contact     If you have questions or need follow up information about today's clinic visit or your schedule please contact [facility] directly at [phone].  Normal or non-critical lab and imaging results will be communicated to you by [employer], letter or phone within 4 business days after the clinic has received the results. If you do not hear from us within 7 days, please contact the clinic through C[employer] or phone. If you have a critical or abnormal lab result, we will notify you by phone as soon as possible.  Submit refill requests through Ambature or call your pharmacy and they will forward the refill request to us. Please allow 3 business days for your refill to be completed.          Additional Information About Your Visit        Ambature Information     Ambature lets you send messages to your doctor, view your test results, renew your prescriptions, schedule appointments and more. To sign up, go to [email or web address] . Click on \"Log in\" on the left side of the screen, which will take you to the Welcome page. Then click on \"Sign up Now\" on the right side of the page.     You will be asked to enter the access code listed below, as well as some personal information. Please " "follow the directions to create your username and password.     Your access code is: [email or web address]  Expires: 2017 11:09 AM     Your access code will  in 90 days. If you need help or a new code, please call your [facility] clinic or [phone].        Care EveryWhere ID     This is your Care EveryWhere ID. This could be used by other organizations to access your [facility] medical records  [id number]        Your Vitals Were     Pulse Temperature Respirations Height BMI (Body Mass Index)       86 98.2  F (36.8  C) (Oral) 16 1.676 m (5' 5.98\") 21.25 kg/m2        Blood Pressure from Last 3 Encounters:   17 110/75   17 110/75   17 143/83    Weight from Last 3 Encounters:   17 59.7 kg (131 lb 9.6 oz)   17 59.7 kg (131 lb 9.6 oz)   17 62.5 kg (137 lb 12.8 oz)                 Today's Medication Changes          These changes are accurate as of: 17  1:26 PM.  If you have any questions, ask your nurse or doctor.               These medicines have changed or have updated prescriptions.        Dose/Directions    * OXYCODONE HCL PO   This may have changed:  Another medication with the same name was changed. Make sure you understand how and when to take each.   Changed by:  Addy Frias MD        Take by mouth as needed   Refills:  0       * oxyCODONE 15 MG IR tablet   Commonly known as:  ROXICODONE   This may have changed:  when to take this   Used for:  Multiple myeloma not having achieved remission (H)   Changed by:  Shayne Roberts MD        Dose:  15 mg   Take 1 tablet (15 mg) by mouth every 8 hours as needed for pain maximum 4 tablet(s) per day   Quantity:  90 tablet   Refills:  0       * Notice:  This list has 2 medication(s) that are the same as other medications prescribed for you. Read the directions carefully, and ask your doctor or other care provider to review them with you.         Where to get your medicines      Some of these will need a paper " prescription and others can be bought over the counter.  Ask your nurse if you have questions.     Bring a paper prescription for each of these medications     oxyCODONE 15 MG IR tablet                Primary Care Provider Office Phone # Fax #    Addy Frias [phone]       [facility] [address]  [place] [address]        Thank you!     Thank you for choosing [facility]  for your care. Our goal is always to provide you with excellent care. Hearing back from our patients is one way we can continue to improve our services. Please take a few minutes to complete the written survey that you may receive in the mail after your visit with us. Thank you!             Your Updated Medication List - Protect others around you: Learn how to safely use, store and throw away your medicines at www.disposemymeds.org.          This list is accurate as of: 5/23/17  1:26 PM.  Always use your most recent med list.                   Brand Name Dispense Instructions for use    acyclovir 400 MG tablet    ZOVIRAX    60 tablet    Take 1 tablet (400 mg) by mouth 2 times daily Viral Prophylaxis.       ASPIRIN NOT PRESCRIBED    INTENTIONAL    0 each    Antiplatelet medication not prescribed intentionally due to Current anticoagulant therapy (warfarin/enoxaparin)       * ATIVAN 0.5 MG tablet   Generic drug:  LORazepam     10 tablet    1 hour prior to MRI take 1 tablet (0.5 mg) and may repeat x's 1.       * LORazepam 0.5 MG tablet    ATIVAN    30 tablet    Take 1 tablet (0.5 mg) by mouth every 4 hours as needed (Anxiety, Nausea/Vomiting or Sleep)       CALCIUM CITRATE + PO      Take 2,000 mg by mouth 2 tabs       carboxymethylcellulose 0.5 % Soln ophthalmic solution    REFRESH PLUS     1 drop 4 times daily       CLARINEX PO      Taking claritin       * COMPAZINE PO      Take 10 mg by mouth       * prochlorperazine 10 MG tablet    COMPAZINE    30 tablet    Take 1 tablet (10 mg) by  mouth every 6 hours as needed (Nausea/Vomiting)       cycloSPORINE 0.05 % ophthalmic emulsion    RESTASIS     Place 1 drop into both eyes every 12 hours       DAILY MULTIVITAMIN PO      Take 1 tablet by mouth daily.       erythromycin ophthalmic ointment    ROMYCIN     Place 1 Application into both eyes At Bedtime       GENTLE STOOL SOFTENER PO      Take 100 mg by mouth daily       metoprolol 50 MG 24 hr tablet    TOPROL-XL    180 tablet    Take 1 tablet (50 mg) by mouth 2 times daily       * OXYCODONE HCL PO      Take by mouth as needed       * oxyCODONE 15 MG IR tablet    ROXICODONE    90 tablet    Take 1 tablet (15 mg) by mouth every 8 hours as needed for pain maximum 4 tablet(s) per day       polyethylene glycol powder    MIRALAX/GLYCOLAX     Take 1 capful by mouth daily       timolol 0.25 % ophthalmic solution    TIMOPTIC     1 drop every morning       TYLENOL PO      Take 500 mg by mouth every 6 hours as needed for mild pain or fever       UNABLE TO FIND      MEDICATION NAME: Fresh Coat eye drops       VITAMIN D3 PO      Take 1,000 Units by mouth daily       warfarin 4 MG tablet    COUMADIN    120 tablet    Take 6 mg on Mon, Wed, Fri; 4 mg all other days or as directed by INR clinic.       ZOMETA IV      Inject into the vein every 30 days Every 3 month dosing       * Notice:  This list has 6 medication(s) that are the same as other medications prescribed for you. Read the directions carefully, and ask your doctor or other care provider to review them with you.

## 2017-05-23 NOTE — MR AVS SNAPSHOT
After Visit Summary   5/23/2017    Amira Arreola    MRN: [id number]           Patient Information     Date Of Birth          7/17/1932        Visit Information        Provider Department      5/23/2017 11:30 AM  INFUSION CHAIR 16 [facility]        Today's Diagnoses     Multiple myeloma not having achieved remission (H)    -  1    Cancer, metastatic to bone (H)           Follow-ups after your visit        Your next 10 appointments already scheduled     May 30, 2017  7:30 AM CDT   IR CHEST PORT PLACEMENT > 5 YRS OF AGE with SHIR1   [facility] Interventional Radiology ([facility])    [address] [place] [id number] [phone]           1. Your doctor will need to do a history and physical within 7 days before this procedure. 2. Your doctor will which medications should not be taken the morning of the exam. 3. Laboratory tests are to be obtained by your doctor prior to the exam (Basic Metabolic Panel, CBCP, PTT and INR) (No labs needed if you are having a tunneled catheter exchange or removal) 4. If you have allergies to x-ray contrast or iodine, contact your doctor or a Radiology nurse prior to the exam day for instructions. 5. Someone will need to drive you to and from the hospital. 6. If you are or may be pregnant, contact your doctor or a Radiology nurse prior to the day of the exam. 7. If you have diabetes, check with your doctor or a Radiology nurse to see if your insulin needs to be adjusted for the exam. 8. If you are taking a medication called Glucophage or Glucovance; these medications need to be held the day of the exam and for approximately 48 hours following. A blood sample must be drawn so your creatinine level can be checked before resuming this medication. 9. If you are taking Coumadin (to thin you blood) please contact your doctor or a Radiology nurse at least 3 days before the exam for special instructions.  10. You should not have received contrast within 48 hours of this exam. 11. The day before your exam you may eat your regular diet and are encouraged to drink at least 2 quarts of clear liquids. Drink no alcoholic beverages for 24 hours prior to the exam. 12. If you have a colostomy you will need to irrigate it with tap water at 8PM the evening before and again at 6AM the morning of the exam. 13. Do not smoke for 24 hours prior to the procedure. 14. Birth to 4 years: - Breast feeding must be stopped 4 hours prior to exam - Solid food or formula must be stopped 6 hours prior to exam - Tube feedings must be stopped 6 hours prior to exam 15. 4-10 years old: - Nothing to eat or drink 6 hours prior to exam 16. 10+ years old: - Nothing to eat or drink 8 hours prior to exam 17. The morning of the exam you may brush your teeth and take medications as directed with a sip of water. 18. When discharged, you cannot drive until morning, and an adult must be with you until then. You should stay in the [facility] overnight. 19. Bring a list of all drugs you are taking; include supplements and over-the-counter medications. Wear comfortable clothes and leave your valuables at home.            May 31, 2017  8:00 AM CDT   Level 7 with  INFUSION CHAIR 11   [facility] ([facility])    [facility]  [address]  [place] [address]   [phone]            Jun 02, 2017  9:30 AM CDT   Anticoagulation Visit with [facility]   [facility] [place]  [facility] [address]   [phone]            Jun 07, 2017  8:00 AM CDT   Level 7 with  INFUSION CHAIR 11   [facility] ([facility])    [facility]  [address]  [facility] [address]   [phone]            Jun 14, 2017  8:30 AM CDT  "  Level 7 with  INFUSION CHAIR 12   [facility] ([facility])    [facility]  [address]  [place] [address]   [phone]            Jun 21, 2017  9:00 AM CDT   Level 7 with  INFUSION CHAIR 15   [facility]fusion [facility] ([facility])    [facility] [place]  [address]  [place] [address] [phone]            Jun 21, 2017 10:00 AM CDT   Return Visit with Shayne Roberts MD   [facility] ([facility])    [facility]  [address]  [place] [address] [phone]              Future tests that were ordered for you today     Open Future Orders        Priority Expected Expires Ordered    IR Chest Port Placement > 5 Yrs of Age Routine  5/23/2018 5/23/2017            Who to contact     If you have questions or need follow up information about today's clinic visit or your schedule please contact [facility] directly at [phone].  Normal or non-critical lab and imaging results will be communicated to you by Dialoggyhart, letter or phone within 4 business days after the clinic has received the results. If you do not hear from us within 7 days, please contact the clinic through Dialoggyhart or phone. If you have a critical or abnormal lab result, we will notify you by phone as soon as possible.  Submit refill requests through Risk Ident or call your pharmacy and they will forward the refill request to us. Please allow 3 business days for your refill to be completed.          Additional Information About Your Visit        Risk Ident Information     Risk Ident lets you send messages to your doctor, view your test results, renew your prescriptions, schedule appointments and more. To sign up, go to [email or web address] . Click on \"Log in\" on the left side of the screen, which will take you to " "the Welcome page. Then click on \"Sign up Now\" on the right side of the page.     You will be asked to enter the access code listed below, as well as some personal information. Please follow the directions to create your username and password.     Your access code is: [email or web address]  Expires: 2017 11:09 AM     Your access code will  in 90 days. If you need help or a new code, please call your [facility] clinic or [phone].        Care EveryWhere ID     This is your Care EveryWhere ID. This could be used by other organizations to access your [facility] medical records  [id number]        Your Vitals Were     Pulse Temperature Respirations Height BMI (Body Mass Index)       86 98.2  F (36.8  C) (Oral) 16 1.676 m (5' 6\") 21.24 kg/m2        Blood Pressure from Last 3 Encounters:   17 110/75   17 110/75   17 143/83    Weight from Last 3 Encounters:   17 59.7 kg (131 lb 9.6 oz)   17 59.7 kg (131 lb 9.6 oz)   17 62.5 kg (137 lb 12.8 oz)              We Performed the Following     Albumin level     Calcium     CBC with platelets differential     Creatinine     Nursing Communication 1     Treatment Conditions          Today's Medication Changes          These changes are accurate as of: 17  3:19 PM.  If you have any questions, ask your nurse or doctor.               These medicines have changed or have updated prescriptions.        Dose/Directions    * ATIVAN 0.5 MG tablet   This may have changed:  Another medication with the same name was changed. Make sure you understand how and when to take each.   Used for:  Anxiety   Generic drug:  LORazepam   Changed by:  Shayne Roberts MD        1 hour prior to MRI take 1 tablet (0.5 mg) and may repeat x's 1.   Quantity:  10 tablet   Refills:  0       * LORazepam 0.5 MG tablet   Commonly known as:  ATIVAN   This may have changed:    - when to take this  - reasons to take this   Used for:  Multiple myeloma not having achieved remission " (H)   Changed by:  Shanye Roberts MD        Dose:  0.5 mg   Take 1 tablet (0.5 mg) by mouth every 8 hours as needed for anxiety   Quantity:  20 tablet   Refills:  3       COMPAZINE PO   This may have changed:  Another medication with the same name was removed. Continue taking this medication, and follow the directions you see here.   Changed by:  Addy Frias MD        Dose:  10 mg   Take 10 mg by mouth   Refills:  0       * OXYCODONE HCL PO   This may have changed:  Another medication with the same name was changed. Make sure you understand how and when to take each.   Changed by:  Addy Frias MD        Take by mouth as needed   Refills:  0       * oxyCODONE 15 MG IR tablet   Commonly known as:  ROXICODONE   This may have changed:  when to take this   Used for:  Multiple myeloma not having achieved remission (H)   Changed by:  Shayne Roberts MD        Dose:  15 mg   Take 1 tablet (15 mg) by mouth every 8 hours as needed for pain maximum 4 tablet(s) per day   Quantity:  90 tablet   Refills:  0       * Notice:  This list has 4 medication(s) that are the same as other medications prescribed for you. Read the directions carefully, and ask your doctor or other care provider to review them with you.         Where to get your medicines      Some of these will need a paper prescription and others can be bought over the counter.  Ask your nurse if you have questions.     Bring a paper prescription for each of these medications     LORazepam 0.5 MG tablet    oxyCODONE 15 MG IR tablet                Primary Care Provider Office Phone # Fax #    Addy Frias [phone]       [facility] [address] [facility] [address]  [facility] [address]        Thank you!     Thank you for choosing [facility]  for your care. Our goal is always to provide you with excellent care. Hearing back from our patients is one way we can continue to improve our  services. Please take a few minutes to complete the written survey that you may receive in the mail after your visit with us. Thank you!             Your Updated Medication List - Protect others around you: Learn how to safely use, store and throw away your medicines at www.disposemymeds.org.          This list is accurate as of: 5/23/17  3:19 PM.  Always use your most recent med list.                   Brand Name Dispense Instructions for use    acyclovir 400 MG tablet    ZOVIRAX    60 tablet    Take 1 tablet (400 mg) by mouth 2 times daily Viral Prophylaxis.       ASPIRIN NOT PRESCRIBED    INTENTIONAL    0 each    Antiplatelet medication not prescribed intentionally due to Current anticoagulant therapy (warfarin/enoxaparin)       * ATIVAN 0.5 MG tablet   Generic drug:  LORazepam     10 tablet    1 hour prior to MRI take 1 tablet (0.5 mg) and may repeat x's 1.       * LORazepam 0.5 MG tablet    ATIVAN    20 tablet    Take 1 tablet (0.5 mg) by mouth every 8 hours as needed for anxiety       CALCIUM CITRATE + PO      Take 2,000 mg by mouth 2 tabs       carboxymethylcellulose 0.5 % Soln ophthalmic solution    REFRESH PLUS     1 drop 4 times daily       CLARINEX PO      Taking claritin       COMPAZINE PO      Take 10 mg by mouth       cycloSPORINE 0.05 % ophthalmic emulsion    RESTASIS     Place 1 drop into both eyes every 12 hours       DAILY MULTIVITAMIN PO      Take 1 tablet by mouth daily.       erythromycin ophthalmic ointment    ROMYCIN     Place 1 Application into both eyes At Bedtime       GENTLE STOOL SOFTENER PO      Take 100 mg by mouth daily       metoprolol 50 MG 24 hr tablet    TOPROL-XL    180 tablet    Take 1 tablet (50 mg) by mouth 2 times daily       * OXYCODONE HCL PO      Take by mouth as needed       * oxyCODONE 15 MG IR tablet    ROXICODONE    90 tablet    Take 1 tablet (15 mg) by mouth every 8 hours as needed for pain maximum 4 tablet(s) per day       polyethylene glycol powder     MIRALAX/GLYCOLAX     Take 1 capful by mouth daily       timolol 0.25 % ophthalmic solution    TIMOPTIC     1 drop every morning       TYLENOL PO      Take 500 mg by mouth every 6 hours as needed for mild pain or fever       UNABLE TO FIND      MEDICATION NAME: Fresh Coat eye drops       VITAMIN D3 PO      Take 1,000 Units by mouth daily       warfarin 4 MG tablet    COUMADIN    120 tablet    Take 6 mg on Mon, Wed, Fri; 4 mg all other days or as directed by INR clinic.       ZOMETA IV      Inject into the vein every 30 days Every 3 month dosing       * Notice:  This list has 4 medication(s) that are the same as other medications prescribed for you. Read the directions carefully, and ask your doctor or other care provider to review them with you.

## 2017-05-23 NOTE — PROGRESS NOTES
"Oncology Rooming Note    May 23, 2017 11:45 AM   Amira Arreola is a 84 year old female who presents for:    Chief Complaint   Patient presents with     Oncology Clinic Visit     Multiple myeloma not having achieved remission      Initial Vitals: /75  Pulse 86  Temp 98.2  F (36.8  C) (Oral)  Resp 16  Ht 1.676 m (5' 5.98\")  Wt 59.7 kg (131 lb 9.6 oz)  BMI 21.25 kg/m2 Estimated body mass index is 21.25 kg/(m^2) as calculated from the following:    Height as of this encounter: 1.676 m (5' 5.98\").    Weight as of this encounter: 59.7 kg (131 lb 9.6 oz). Body surface area is 1.67 meters squared.  No Pain (0) Comment: Data Unavailable   No LMP recorded. Patient is postmenopausal.  Allergies reviewed: Yes  Medications reviewed: Yes    Medications: MEDICATION REFILL NEEDED ON OXYCODONE   Pharm[facility] entered into [email or web address] [address] [place] [address] AT [facility] [address]    Clinical concerns: None       5 minutes for nursing intake (face to face time)     Zora Bentley MA      DISCHARGE PLAN:     Add Daratumumab to treatment/ Lopez did education  TO start next wed..  Port placement 5/30/17.. On Coumadin...Advised to follow instructions from scheduling for [facility].( 5 days prior)   Dr. Roberts aware:   Port Education done.  Schedule arranged.. Now for weekly velcade on WEd ( Memorial day) Zometa q 3 months and Daratumumab  Oxycodone rx given to her    Tameka Daigle RN      "

## 2017-05-23 NOTE — PROGRESS NOTES
Infusion Nursing Note:  Amira Arreola presents today for C3D8 Velcade, zometa.    Patient seen by provider today: Yes: Dr. Roberts   present during visit today: Not Applicable.    Note: N/A.    Intravenous Access:  Labs drawn without difficulty.  Peripheral IV placed.    Treatment Conditions:  Lab Results   Component Value Date    HGB 12.9 05/23/2017     Lab Results   Component Value Date    WBC 5.0 05/23/2017      Lab Results   Component Value Date    ANEU 3.2 05/23/2017     Lab Results   Component Value Date     05/23/2017      Lab Results   Component Value Date     05/16/2017                   Lab Results   Component Value Date    POTASSIUM 4.1 05/16/2017           No results found for: MAG         Lab Results   Component Value Date    CR 0.54 05/23/2017                   Lab Results   Component Value Date    BRADLEY 9.4 05/23/2017                Lab Results   Component Value Date    BILITOTAL 0.6 05/16/2017           Lab Results   Component Value Date    ALBUMIN 3.6 05/23/2017                    Lab Results   Component Value Date    ALT 28 05/16/2017           Lab Results   Component Value Date    AST 27 05/16/2017     Results reviewed, labs MET treatment parameters, ok to proceed with treatment.      Post Infusion Assessment:  Patient tolerated infusion without incident.  Patient tolerated injection without incident.  Blood return noted pre and post infusion.  Site patent and intact, free from redness, edema or discomfort.  No evidence of extravasations.  Access discontinued per protocol.    Discharge Plan:   Patient declined prescription refills.  Discharge instructions reviewed with: Patient.  Patient and/or family verbalized understanding of discharge instructions and all questions answered.  Copy of AVS reviewed with patient and/or family.  Patient will return 5/30/17 for next appointment.  Patient discharged in stable condition accompanied by: self.  Departure Mode:  Ambulatory.    Fanny Bob RN

## 2017-05-23 NOTE — PATIENT INSTRUCTIONS
Add daratumab to chemo.  Side effect of daratumab.  Bebeto-cath placement.  Follow up in 1 month.      You are scheduled for a Port Placement on Tuesday, May 30, 2017 @ 7:30am  [facility] Interventional Radiology   Check in at the [facility] Desk @ 6:30am  Nothing to eat or drink after midnight  You will need a   STOP Coumadin

## 2017-05-24 ENCOUNTER — TELEPHONE (OUTPATIENT)
Dept: ONCOLOGY | Facility: CLINIC | Age: 82
End: 2017-05-24

## 2017-05-24 NOTE — PROGRESS NOTES
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     1.  She had work-up for thrombocytopenia.       - On 09/21/2015, WBC of 4.2, hemoglobin of 13.2 and platelets of 138. CMP normal except mildly low protein of 6.4.   -On 09/29/2017, SPEP does not reveal any M-spike.   - On 10/02/2015, JANET does not reveal any monoclonal protein.     - On 10/22/2015, urine immunofixation reveals monoclonal free kappa light chain.    2. On 05/11/2016, kappa light chain of 50, lambda light chain of 0.32 and ratio of kappa to lambda of 156.2.  3. Skeletal survey on 05/23/2016 does not reveal any lytic lesion.    4. Bone marrow biopsy on 05/25/2016 reveals 40-50% kappa light chain restricted plasma cells.  Cytogenetics is normal. 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    5. MRI of bones on 06/21/2016 and 06/22/2016 reveals myeloma lesions.  6. On 08/24/2016, she was started on revlimid 25 mg 3 weeks on and 1 week off along with dexamethasone 20 mg weekly. Due to cytopenia, dose was subsequently reduced to 15 mg a day. Treatment in between had to be delayed because of cytopenia. She did not have any significant response to treatment.   7.Velcade and dexamethasone started on 03/21/2017.    8. On 03/21/2017, kappa free light chain was 52.5.  It decreased to 41.75 on 04/18/2017.  It has increased to 60.75 on 05/16/2017.       SUBJECTIVE:  Ms. Amira Arreola is an 84-year-old female with kappa free light chain multiple myeloma, standard-risk.  The patient is on Velcade and dexamethasone since 03/21/2017.  She is tolerating it well.      On 03/21/2017, kappa free light chain was 52.5.  It decreased to 41.75 on 04/18/2017.  It has increased to 60.75 on 05/16/2017.      Overall, she is doing well.  She has fatigue.  No worsening of it.   No headache.  No dizziness.  No neck pain.  No chest pain or difficulty breathing.  No abdominal pain, nausea or vomiting.  No urinary or bowel complaints.  She has some back pain and also pain in the knee joint.  She takes oxycodone which helps and she wants a refill.      PHYSICAL EXAMINATION:   GENERAL:  She is alert and oriented x3.   EYES:  No icterus.   THROAT:  No ulcer or thrush.   NECK:  Supple. No lymphadenopathy or thyromegaly.   AXILLAE:  No lymphadenopathy.   LUNGS:  Good air entry bilaterally.  No crackles or wheezing.   HEART:  Regular.  No murmur.   ABDOMEN:  Soft and nontender.  No mass.   EXTREMITIES:  No edema.  No calf swelling or tenderness.   SKIN:  No rash.      LABORATORY DATA:  Reviewed.      ASSESSMENT:   1.  An 84-year-old female with a kappa free light chain multiple myeloma which is progressing on Velcade and dexamethasone.   2.  Back pain and joint pain.      PLAN:   1.  I discussed regarding labs.  I explained to her that kappa free light chain is increasing. Myeloma is progressing.      She is on Velcade and dexamethasone.  She is tolerating it well. I discussed regarding adding a third agent.  The patient previously has been on Revlimid.  She had side effects and dose was reduced.  Discussed with her regarding adding daratumumab.  Side effect of daratumumab including infusion reactions were all discussed.  She is agreeable for it.  Starting next week, she will get daratumumab along with Velcade and dexamethasone.      2.  She is on Zometa every 3 months, which will be continued.    3.  Prescription of oxycodone refilled.   4.  Discussed regarding getting a Port-A-Cath placed because she will be getting IV daratumumab.  She is agreeable for it.  An appointment will be made.   5.  She had a few questions, which were all answered.  I will see her in a month for followup.         ITZ LOPEZ MD             D: 05/23/2017 16:04   T: 05/24/2017 07:58   MT: KARIN      Name:     EB  ALBERTO   MRN:      [id number]        Account:      [id number]   :      1932           Visit Date:   2017      Document: [id number]

## 2017-05-26 RX ORDER — ALBUTEROL SULFATE 90 UG/1
1-2 AEROSOL, METERED RESPIRATORY (INHALATION)
Status: CANCELLED
Start: 2017-06-21

## 2017-05-26 RX ORDER — DIPHENHYDRAMINE HCL 25 MG
50 CAPSULE ORAL ONCE
Status: CANCELLED | OUTPATIENT
Start: 2017-06-14

## 2017-05-26 RX ORDER — EPINEPHRINE 0.3 MG/.3ML
0.3 INJECTION SUBCUTANEOUS EVERY 5 MIN PRN
Status: CANCELLED | OUTPATIENT
Start: 2017-06-14

## 2017-05-26 RX ORDER — ALBUTEROL SULFATE 0.83 MG/ML
2.5 SOLUTION RESPIRATORY (INHALATION)
Status: CANCELLED | OUTPATIENT
Start: 2017-06-21

## 2017-05-26 RX ORDER — ACETAMINOPHEN 325 MG/1
650 TABLET ORAL ONCE
Status: CANCELLED | OUTPATIENT
Start: 2017-06-21

## 2017-05-26 RX ORDER — ALBUTEROL SULFATE 0.83 MG/ML
2.5 SOLUTION RESPIRATORY (INHALATION)
Status: CANCELLED | OUTPATIENT
Start: 2017-06-14

## 2017-05-26 RX ORDER — SODIUM CHLORIDE 9 MG/ML
1000 INJECTION, SOLUTION INTRAVENOUS CONTINUOUS PRN
Status: CANCELLED
Start: 2017-06-14

## 2017-05-26 RX ORDER — ALBUTEROL SULFATE 0.83 MG/ML
2.5 SOLUTION RESPIRATORY (INHALATION)
Status: CANCELLED | OUTPATIENT
Start: 2017-05-31

## 2017-05-26 RX ORDER — EPINEPHRINE 1 MG/ML
0.3 INJECTION INTRAMUSCULAR; INTRAVENOUS; SUBCUTANEOUS EVERY 5 MIN PRN
Status: CANCELLED | OUTPATIENT
Start: 2017-06-21

## 2017-05-26 RX ORDER — MEPERIDINE HYDROCHLORIDE 50 MG/ML
25 INJECTION INTRAMUSCULAR; INTRAVENOUS; SUBCUTANEOUS EVERY 30 MIN PRN
Status: CANCELLED | OUTPATIENT
Start: 2017-06-21

## 2017-05-26 RX ORDER — SODIUM CHLORIDE 9 MG/ML
1000 INJECTION, SOLUTION INTRAVENOUS CONTINUOUS PRN
Status: CANCELLED
Start: 2017-05-31

## 2017-05-26 RX ORDER — METHYLPREDNISOLONE SODIUM SUCCINATE 125 MG/2ML
125 INJECTION, POWDER, LYOPHILIZED, FOR SOLUTION INTRAMUSCULAR; INTRAVENOUS
Status: CANCELLED
Start: 2017-06-14

## 2017-05-26 RX ORDER — METHYLPREDNISOLONE SODIUM SUCCINATE 125 MG/2ML
125 INJECTION, POWDER, LYOPHILIZED, FOR SOLUTION INTRAMUSCULAR; INTRAVENOUS
Status: CANCELLED
Start: 2017-06-07

## 2017-05-26 RX ORDER — MEPERIDINE HYDROCHLORIDE 50 MG/ML
25 INJECTION INTRAMUSCULAR; INTRAVENOUS; SUBCUTANEOUS EVERY 30 MIN PRN
Status: CANCELLED | OUTPATIENT
Start: 2017-06-14

## 2017-05-26 RX ORDER — EPINEPHRINE 0.3 MG/.3ML
0.3 INJECTION SUBCUTANEOUS EVERY 5 MIN PRN
Status: CANCELLED | OUTPATIENT
Start: 2017-06-21

## 2017-05-26 RX ORDER — DIPHENHYDRAMINE HCL 25 MG
50 CAPSULE ORAL ONCE
Status: CANCELLED | OUTPATIENT
Start: 2017-06-07

## 2017-05-26 RX ORDER — EPINEPHRINE 0.3 MG/.3ML
0.3 INJECTION SUBCUTANEOUS EVERY 5 MIN PRN
Status: CANCELLED | OUTPATIENT
Start: 2017-06-07

## 2017-05-26 RX ORDER — LORAZEPAM 2 MG/ML
0.5 INJECTION INTRAMUSCULAR EVERY 4 HOURS PRN
Status: CANCELLED
Start: 2017-06-14

## 2017-05-26 RX ORDER — METHYLPREDNISOLONE SODIUM SUCCINATE 125 MG/2ML
125 INJECTION, POWDER, LYOPHILIZED, FOR SOLUTION INTRAMUSCULAR; INTRAVENOUS
Status: CANCELLED
Start: 2017-05-31

## 2017-05-26 RX ORDER — DIPHENHYDRAMINE HYDROCHLORIDE 50 MG/ML
50 INJECTION INTRAMUSCULAR; INTRAVENOUS
Status: CANCELLED
Start: 2017-05-31

## 2017-05-26 RX ORDER — EPINEPHRINE 0.3 MG/.3ML
0.3 INJECTION SUBCUTANEOUS EVERY 5 MIN PRN
Status: CANCELLED | OUTPATIENT
Start: 2017-05-31

## 2017-05-26 RX ORDER — ACETAMINOPHEN 325 MG/1
650 TABLET ORAL ONCE
Status: CANCELLED | OUTPATIENT
Start: 2017-05-31

## 2017-05-26 RX ORDER — SODIUM CHLORIDE 9 MG/ML
1000 INJECTION, SOLUTION INTRAVENOUS CONTINUOUS PRN
Status: CANCELLED
Start: 2017-06-07

## 2017-05-26 RX ORDER — MEPERIDINE HYDROCHLORIDE 50 MG/ML
25 INJECTION INTRAMUSCULAR; INTRAVENOUS; SUBCUTANEOUS EVERY 30 MIN PRN
Status: CANCELLED | OUTPATIENT
Start: 2017-06-07

## 2017-05-26 RX ORDER — EPINEPHRINE 1 MG/ML
0.3 INJECTION INTRAMUSCULAR; INTRAVENOUS; SUBCUTANEOUS EVERY 5 MIN PRN
Status: CANCELLED | OUTPATIENT
Start: 2017-05-31

## 2017-05-26 RX ORDER — DIPHENHYDRAMINE HYDROCHLORIDE 50 MG/ML
50 INJECTION INTRAMUSCULAR; INTRAVENOUS
Status: CANCELLED
Start: 2017-06-07

## 2017-05-26 RX ORDER — ALBUTEROL SULFATE 0.83 MG/ML
2.5 SOLUTION RESPIRATORY (INHALATION)
Status: CANCELLED | OUTPATIENT
Start: 2017-06-07

## 2017-05-26 RX ORDER — EPINEPHRINE 1 MG/ML
0.3 INJECTION INTRAMUSCULAR; INTRAVENOUS; SUBCUTANEOUS EVERY 5 MIN PRN
Status: CANCELLED | OUTPATIENT
Start: 2017-06-07

## 2017-05-26 RX ORDER — LORAZEPAM 2 MG/ML
0.5 INJECTION INTRAMUSCULAR EVERY 4 HOURS PRN
Status: CANCELLED
Start: 2017-06-21

## 2017-05-26 RX ORDER — ACETAMINOPHEN 325 MG/1
650 TABLET ORAL ONCE
Status: CANCELLED | OUTPATIENT
Start: 2017-06-07

## 2017-05-26 RX ORDER — DIPHENHYDRAMINE HCL 25 MG
50 CAPSULE ORAL ONCE
Status: CANCELLED | OUTPATIENT
Start: 2017-06-21

## 2017-05-26 RX ORDER — DIPHENHYDRAMINE HYDROCHLORIDE 50 MG/ML
50 INJECTION INTRAMUSCULAR; INTRAVENOUS
Status: CANCELLED
Start: 2017-06-21

## 2017-05-26 RX ORDER — ALBUTEROL SULFATE 90 UG/1
1-2 AEROSOL, METERED RESPIRATORY (INHALATION)
Status: CANCELLED
Start: 2017-06-14

## 2017-05-26 RX ORDER — LORAZEPAM 2 MG/ML
0.5 INJECTION INTRAMUSCULAR EVERY 4 HOURS PRN
Status: CANCELLED
Start: 2017-05-31

## 2017-05-26 RX ORDER — EPINEPHRINE 1 MG/ML
0.3 INJECTION INTRAMUSCULAR; INTRAVENOUS; SUBCUTANEOUS EVERY 5 MIN PRN
Status: CANCELLED | OUTPATIENT
Start: 2017-06-14

## 2017-05-26 RX ORDER — DIPHENHYDRAMINE HCL 25 MG
50 CAPSULE ORAL ONCE
Status: CANCELLED | OUTPATIENT
Start: 2017-05-31

## 2017-05-26 RX ORDER — ACETAMINOPHEN 325 MG/1
650 TABLET ORAL ONCE
Status: CANCELLED | OUTPATIENT
Start: 2017-06-14

## 2017-05-26 RX ORDER — DIPHENHYDRAMINE HYDROCHLORIDE 50 MG/ML
50 INJECTION INTRAMUSCULAR; INTRAVENOUS
Status: CANCELLED
Start: 2017-06-14

## 2017-05-26 RX ORDER — SODIUM CHLORIDE 9 MG/ML
1000 INJECTION, SOLUTION INTRAVENOUS CONTINUOUS PRN
Status: CANCELLED
Start: 2017-06-21

## 2017-05-26 RX ORDER — METHYLPREDNISOLONE SODIUM SUCCINATE 125 MG/2ML
125 INJECTION, POWDER, LYOPHILIZED, FOR SOLUTION INTRAMUSCULAR; INTRAVENOUS
Status: CANCELLED
Start: 2017-06-21

## 2017-05-26 RX ORDER — ALBUTEROL SULFATE 90 UG/1
1-2 AEROSOL, METERED RESPIRATORY (INHALATION)
Status: CANCELLED
Start: 2017-06-07

## 2017-05-26 RX ORDER — LORAZEPAM 2 MG/ML
0.5 INJECTION INTRAMUSCULAR EVERY 4 HOURS PRN
Status: CANCELLED
Start: 2017-06-07

## 2017-05-26 RX ORDER — MEPERIDINE HYDROCHLORIDE 50 MG/ML
25 INJECTION INTRAMUSCULAR; INTRAVENOUS; SUBCUTANEOUS EVERY 30 MIN PRN
Status: CANCELLED | OUTPATIENT
Start: 2017-05-31

## 2017-05-26 RX ORDER — ALBUTEROL SULFATE 90 UG/1
1-2 AEROSOL, METERED RESPIRATORY (INHALATION)
Status: CANCELLED
Start: 2017-05-31

## 2017-05-30 ENCOUNTER — HOSPITAL ENCOUNTER (OUTPATIENT)
Facility: CLINIC | Age: 82
Discharge: HOME | End: 2017-05-30
Attending: RADIOLOGY | Admitting: RADIOLOGY
Payer: MEDICARE

## 2017-05-30 ENCOUNTER — APPOINTMENT (OUTPATIENT)
Dept: INTERVENTIONAL RADIOLOGY/VASCULAR | Facility: CLINIC | Age: 82
End: 2017-05-30
Attending: INTERNAL MEDICINE
Payer: MEDICARE

## 2017-05-30 VITALS
TEMPERATURE: 97 F | SYSTOLIC BLOOD PRESSURE: 93 MMHG | DIASTOLIC BLOOD PRESSURE: 66 MMHG | OXYGEN SATURATION: 97 % | RESPIRATION RATE: 16 BRPM | HEART RATE: 103 BPM

## 2017-05-30 DIAGNOSIS — C90.00 MULTIPLE MYELOMA NOT HAVING ACHIEVED REMISSION (H): ICD-10-CM

## 2017-05-30 LAB
APTT PPP: 23 SEC (ref 22–37)
ERYTHROCYTE [DISTWIDTH] IN BLOOD BY AUTOMATED COUNT: 15.2 % (ref 10–15)
HCT VFR BLD AUTO: 33.4 % (ref 35–47)
HGB BLD-MCNC: 11.2 G/DL (ref 11.7–15.7)
INR BLD: 1.1 (ref 0.86–1.14)
INR PPP: 1.05 (ref 0.86–1.14)
MCH RBC QN AUTO: 33.9 PG (ref 26.5–33)
MCHC RBC AUTO-ENTMCNC: 33.5 G/DL (ref 31.5–36.5)
MCV RBC AUTO: 101 FL (ref 78–100)
PLATELET # BLD AUTO: 117 10E9/L (ref 150–450)
RBC # BLD AUTO: 3.3 10E12/L (ref 3.8–5.2)
WBC # BLD AUTO: 4.3 10E9/L (ref 4–11)

## 2017-05-30 PROCEDURE — 40000854 ZZH STATISTIC SIMPLE TUBE INSERTION/CHARGE, PORT, CATH, FISTULOGRAM

## 2017-05-30 PROCEDURE — 36561 INSERT TUNNELED CV CATH: CPT

## 2017-05-30 PROCEDURE — 25000125 ZZHC RX 250: Performed by: RADIOLOGY

## 2017-05-30 PROCEDURE — 85027 COMPLETE CBC AUTOMATED: CPT | Performed by: RADIOLOGY

## 2017-05-30 PROCEDURE — 27210742 ZZH CATH CR1

## 2017-05-30 PROCEDURE — 36415 COLL VENOUS BLD VENIPUNCTURE: CPT | Performed by: RADIOLOGY

## 2017-05-30 PROCEDURE — 85610 PROTHROMBIN TIME: CPT | Mod: QW | Performed by: RADIOLOGY

## 2017-05-30 PROCEDURE — 27211193 ZZ H WOUND GLUE CR1

## 2017-05-30 PROCEDURE — 85610 PROTHROMBIN TIME: CPT | Performed by: RADIOLOGY

## 2017-05-30 PROCEDURE — 25000128 H RX IP 250 OP 636

## 2017-05-30 PROCEDURE — 85730 THROMBOPLASTIN TIME PARTIAL: CPT | Performed by: RADIOLOGY

## 2017-05-30 PROCEDURE — 27210906 ZZH KIT CR8

## 2017-05-30 PROCEDURE — 25000125 ZZHC RX 250

## 2017-05-30 PROCEDURE — S0077 INJECTION, CLINDAMYCIN PHOSP: HCPCS | Performed by: RADIOLOGY

## 2017-05-30 PROCEDURE — C1788 PORT, INDWELLING, IMP: HCPCS

## 2017-05-30 PROCEDURE — C1769 GUIDE WIRE: HCPCS

## 2017-05-30 RX ORDER — FENTANYL CITRATE 50 UG/ML
25-50 INJECTION, SOLUTION INTRAMUSCULAR; INTRAVENOUS EVERY 5 MIN PRN
Status: DISCONTINUED | OUTPATIENT
Start: 2017-05-30 | End: 2017-05-30 | Stop reason: HOSPADM

## 2017-05-30 RX ORDER — FLUMAZENIL 0.1 MG/ML
0.2 INJECTION, SOLUTION INTRAVENOUS
Status: DISCONTINUED | OUTPATIENT
Start: 2017-05-30 | End: 2017-05-30 | Stop reason: HOSPADM

## 2017-05-30 RX ORDER — HEPARIN SODIUM 1000 [USP'U]/ML
INJECTION, SOLUTION INTRAVENOUS; SUBCUTANEOUS
Status: DISCONTINUED
Start: 2017-05-30 | End: 2017-05-30 | Stop reason: HOSPADM

## 2017-05-30 RX ORDER — HEPARIN SODIUM (PORCINE) LOCK FLUSH IV SOLN 100 UNIT/ML 100 UNIT/ML
500 SOLUTION INTRAVENOUS ONCE
Status: COMPLETED | OUTPATIENT
Start: 2017-05-30 | End: 2017-05-30

## 2017-05-30 RX ORDER — LIDOCAINE 40 MG/G
CREAM TOPICAL
Status: DISCONTINUED | OUTPATIENT
Start: 2017-05-30 | End: 2017-05-30 | Stop reason: HOSPADM

## 2017-05-30 RX ORDER — FENTANYL CITRATE 50 UG/ML
INJECTION, SOLUTION INTRAMUSCULAR; INTRAVENOUS
Status: DISCONTINUED
Start: 2017-05-30 | End: 2017-05-30 | Stop reason: HOSPADM

## 2017-05-30 RX ORDER — CLINDAMYCIN PHOSPHATE 900 MG/50ML
900 INJECTION, SOLUTION INTRAVENOUS
Status: COMPLETED | OUTPATIENT
Start: 2017-05-30 | End: 2017-05-30

## 2017-05-30 RX ORDER — SODIUM CHLORIDE 9 MG/ML
INJECTION, SOLUTION INTRAVENOUS CONTINUOUS
Status: DISCONTINUED | OUTPATIENT
Start: 2017-05-30 | End: 2017-05-30 | Stop reason: HOSPADM

## 2017-05-30 RX ORDER — NALOXONE HYDROCHLORIDE 0.4 MG/ML
.1-.4 INJECTION, SOLUTION INTRAMUSCULAR; INTRAVENOUS; SUBCUTANEOUS
Status: DISCONTINUED | OUTPATIENT
Start: 2017-05-30 | End: 2017-05-30 | Stop reason: HOSPADM

## 2017-05-30 RX ORDER — LIDOCAINE HYDROCHLORIDE 10 MG/ML
1-30 INJECTION, SOLUTION EPIDURAL; INFILTRATION; INTRACAUDAL; PERINEURAL
Status: COMPLETED | OUTPATIENT
Start: 2017-05-30 | End: 2017-05-30

## 2017-05-30 RX ADMIN — LIDOCAINE HYDROCHLORIDE 190 MG: 10 INJECTION, SOLUTION EPIDURAL; INFILTRATION; INTRACAUDAL; PERINEURAL at 09:05

## 2017-05-30 RX ADMIN — FENTANYL CITRATE 25 MCG: 50 INJECTION, SOLUTION INTRAMUSCULAR; INTRAVENOUS at 08:14

## 2017-05-30 RX ADMIN — HEPARIN SODIUM (PORCINE) LOCK FLUSH IV SOLN 100 UNIT/ML 500 UNITS: 100 SOLUTION at 09:06

## 2017-05-30 RX ADMIN — FENTANYL CITRATE 25 MCG: 50 INJECTION, SOLUTION INTRAMUSCULAR; INTRAVENOUS at 08:38

## 2017-05-30 RX ADMIN — MIDAZOLAM HYDROCHLORIDE 0.5 MG: 1 INJECTION, SOLUTION INTRAMUSCULAR; INTRAVENOUS at 08:15

## 2017-05-30 RX ADMIN — CLINDAMYCIN PHOSPHATE 900 MG: 18 INJECTION, SOLUTION INTRAVENOUS at 07:46

## 2017-05-30 NOTE — DISCHARGE INSTRUCTIONS
Port Insertion Discharge Instructions     After you go home:      Have an adult stay with you for the first 6 hours    You may resume your normal diet       For 24 hours - due to the sedation you received:    Relax and take it easy    Do NOT make any important or legal decisions    Do NOT drive or operate machines at home or at work    Do NOT drink alcohol    Care of Puncture Sites:      Keep the dressings on your sites clean & dry for 3 days. Change it only if it gets wet or dirty.    You may shower after the dressing comes off in 3 days    Do not remove the small white strips of tape, if present. Allow them to fall off on their own.     You may cover the wound with a bandaid after the dressing is removed if needed for comfort      Activity       Avoid heavy lifting (greater than 10 pounds) or the overuse of your shoulder for 3 days    Bleeding:      If you start bleeding from the incision sites in your chest or neck - or have swelling in your neck, sit down and press on the site for 5-10 minutes.     If bleeding has not stopped after 10 minutes, call your provider.        Call 911 right away if you have heavy bleeding or bleeding that does not stop.      Medicines:      You may resume all medications    Resume your Warfarin/Coumadin at your regular dose today. Follow up with your provider to have your INR rechecked    Resume your Platelet Inhibitors and Aspirin tomorrow at your regular dose    For minor pain, you may take Acetaminophen (Tylenol) or Ibuprofen (Advil)    Call the provider who ordered this procedure if:      You have swelling in your neck or over your port site    The incision area is red, swollen, hot or tender    You have chills or a fever greater than 101 F (38 C)    Any questions or concerns    Call  911 or go to the Emergency Room if you have:      Severe chest pain or trouble breathing    Bleeding that you cannot control    Additional Information:      Your port may be accessed right away.      You will need to have your port flushed every 30 days or after each use.      If you have questions call:          [facility] Radiology Dept @ [phone]      The provider who performed your procedure was Dr Perez

## 2017-05-30 NOTE — IP AVS SNAPSHOT
MRN:[id number]                      After Visit Summary   5/30/2017    Amira Arreola    MRN: [id number]           Visit Information        Department      5/30/2017  6:23 AM [facility] Interventional Radiology          Review of your medicines      UNREVIEWED medicines. Ask your doctor about these medicines        Dose / Directions    acyclovir 400 MG tablet   Commonly known as:  ZOVIRAX   Used for:  Multiple myeloma not having achieved remission (H)        Dose:  400 mg   Take 1 tablet (400 mg) by mouth 2 times daily Viral Prophylaxis.   Quantity:  60 tablet   Refills:  11       ASPIRIN NOT PRESCRIBED   Commonly known as:  INTENTIONAL   Used for:  Paroxysmal atrial fibrillation (H)        Antiplatelet medication not prescribed intentionally due to Current anticoagulant therapy (warfarin/enoxaparin)   Quantity:  0 each   Refills:  0       * ATIVAN 0.5 MG tablet   Used for:  Anxiety   Generic drug:  LORazepam        1 hour prior to MRI take 1 tablet (0.5 mg) and may repeat x's 1.   Quantity:  10 tablet   Refills:  0       * LORazepam 0.5 MG tablet   Commonly known as:  ATIVAN   Used for:  Multiple myeloma not having achieved remission (H)        Dose:  0.5 mg   Take 1 tablet (0.5 mg) by mouth every 8 hours as needed for anxiety   Quantity:  20 tablet   Refills:  3       CALCIUM CITRATE + PO        Dose:  2000 mg   Take 2,000 mg by mouth 2 tabs   Refills:  0       carboxymethylcellulose 0.5 % Soln ophthalmic solution   Commonly known as:  REFRESH PLUS        Dose:  1 drop   1 drop 4 times daily   Refills:  0       CLARINEX PO        Taking claritin   Refills:  0       COMPAZINE PO        Dose:  10 mg   Take 10 mg by mouth   Refills:  0       cycloSPORINE 0.05 % ophthalmic emulsion   Commonly known as:  RESTASIS        Dose:  1 drop   Place 1 drop into both eyes every 12 hours   Refills:  0       DAILY MULTIVITAMIN PO   Used for:  Routine general medical examination at a [facility]  facility        Dose:  1 tablet   Take 1 tablet by mouth daily.   Refills:  0       erythromycin ophthalmic ointment   Commonly known as:  ROMYCIN        Dose:  1 Application   Place 1 Application into both eyes At Bedtime   Refills:  0       GENTLE STOOL SOFTENER PO        Dose:  100 mg   Take 100 mg by mouth daily   Refills:  0       metoprolol 50 MG 24 hr tablet   Commonly known as:  TOPROL-XL   Used for:  SVT (supraventricular tachycardia) (H), Paroxysmal atrial fibrillation (H)        Dose:  50 mg   Take 1 tablet (50 mg) by mouth 2 times daily   Quantity:  180 tablet   Refills:  3       * OXYCODONE HCL PO        Take by mouth as needed   Refills:  0       * oxyCODONE 15 MG IR tablet   Commonly known as:  ROXICODONE   Used for:  Multiple myeloma not having achieved remission (H)        Dose:  15 mg   Take 1 tablet (15 mg) by mouth every 8 hours as needed for pain maximum 4 tablet(s) per day   Quantity:  90 tablet   Refills:  0       polyethylene glycol powder   Commonly known as:  MIRALAX/GLYCOLAX   Used for:  Bilateral leg edema        Dose:  1 capful   Take 1 capful by mouth daily   Refills:  0       timolol 0.25 % ophthalmic solution   Commonly known as:  TIMOPTIC        Dose:  1 drop   1 drop every morning   Refills:  0       TYLENOL PO        Dose:  500 mg   Take 500 mg by mouth every 6 hours as needed for mild pain or fever   Refills:  0       UNABLE TO FIND        MEDICATION NAME: Fresh Coat eye drops   Refills:  0       VITAMIN D3 PO        Dose:  1000 Units   Take 1,000 Units by mouth daily   Refills:  0       warfarin 4 MG tablet   Commonly known as:  COUMADIN   Used for:  Paroxysmal atrial fibrillation (H), Long-term (current) use of anticoagulants        Take 6 mg on Mon, Wed, Fri; 4 mg all other days or as directed by INR clinic.   Quantity:  120 tablet   Refills:  0       ZOMETA IV        Inject into the vein every 30 days Every 3 month dosing   Refills:  0       * Notice:  This list has 4  medication(s) that are the same as other medications prescribed for you. Read the directions carefully, and ask your doctor or other care provider to review them with you.             Protect others around you: Learn how to safely use, store and throw away your medicines at www.disposemymeds.org.         Follow-ups after your visit        Your next 10 appointments already scheduled     May 31, 2017  8:00 AM CDT   Level 7 with  INFUSION CHAIR 11   [facility] ([facility])    [place] [id number]  [place] [address]   [phone]            Jun 02, 2017  9:30 AM CDT   [facility]tico[place] Visit with [facility]   [facility] [address]   [phone]            Jun 07, 2017  8:00 AM CDT   Level 7 with  INFUSION CHAIR 11   [facility] ([facility])    [facility] [place] [id number]  [place] [address]   [phone]            Jun 14, 2017  8:30 AM CDT   Level 7 with  INFUSION CHAIR 12   [facility] ([facility])    [facility]  [address]  [place] [address]   [phone]            Jun 21, 2017  9:00 AM CDT   Level 7 with  INFUSION CHAIR 15   [facility] ([facility])    [facility] [place] [id number]  [place] [address]   [phone]            Jun 21, 2017 10:00 AM CDT   Return Visit with Shayne Roberts MD   [facility]    [facility]  [address]  [place] [address]   [phone]               Care Instructions        Further instructions from your care team         Port Insertion Discharge Instructions      After you go home:      Have an adult stay with you for the first 6 hours    You may resume your normal diet       For 24 hours - due to the sedation you received:    Relax and take it easy    Do NOT make any important or legal decisions    Do NOT drive or operate machines at home or at work    Do NOT drink alcohol    Care of Puncture Sites:      Keep the dressings on your sites clean & dry for 3 days. Change it only if it gets wet or dirty.    You may shower after the dressing comes off in 3 days    Do not remove the small white strips of tape, if present. Allow them to fall off on their own.     You may cover the wound with a bandaid after the dressing is removed if needed for comfort      Activity       Avoid heavy lifting (greater than 10 pounds) or the overuse of your shoulder for 3 days    Bleeding:      If you start bleeding from the incision sites in your chest or neck - or have swelling in your neck, sit down and press on the site for 5-10 minutes.     If bleeding has not stopped after 10 minutes, call your provider.        Call 911 right away if you have heavy bleeding or bleeding that does not stop.      Medicines:      You may resume all medications    Resume your Warfarin/Coumadin at your regular dose today. Follow up with your provider to have your INR rechecked    Resume your Platelet Inhibitors and Aspirin tomorrow at your regular dose    For minor pain, you may take Acetaminophen (Tylenol) or Ibuprofen (Advil)    Call the provider who ordered this procedure if:      You have swelling in your neck or over your port site    The incision area is red, swollen, hot or tender    You have chills or a fever greater than 101 F (38 C)    Any questions or concerns    Call  911 or go to the Emergency Room if you have:      Severe chest pain or trouble breathing    Bleeding that you cannot control    Additional Information:      Your port may be accessed right away.     You will need to have your port  "flushed every 30 days or after each use.      If you have questions call:          [facility] Radiology Dept @ [phone]      The provider who performed your procedure was Dr Perez     Additional Information About Your Visit        [employer] Information     [employer] lets you send messages to your doctor, view your test results, renew your prescriptions, schedule appointments and more. To sign up, go to [email or web address] . Click on \"Log in\" on the left side of the screen, which will take you to the Welcome page. Then click on \"Sign up Now\" on the right side of the page.     You will be asked to enter the access code listed below, as well as some personal information. Please follow the directions to create your username and password.     Your access code is: [email or web address]  Expires: 2017 11:09 AM     Your access code will  in 90 days. If you need help or a new code, please call your [facility] clinic or [phone].        Care EveryWhere ID     This is your Care EveryWhere ID. This could be used by other organizations to access your [facility] medical records  [id number]        Your Vitals Were     Blood Pressure Pulse Temperature Respirations Pulse Oximetry       115/84 (BP Location: Right arm) 103 97  F (36.1  C) (Oral) 18 99%        Primary Care Provider Office Phone # Fax #    Addy Frias [phone]      Thank you!     Thank you for choosing [facility] for your care. Our goal is always to provide you with excellent care. Hearing back from our patients is one way we can continue to improve our services. Please take a few minutes to complete the written survey that you may receive in the mail after you visit with us. Thank you!             Medication List: This is a list of all your medications and when to take them. Check marks below indicate your daily home schedule. Keep this list as a reference.      Medications           Morning Afternoon Evening Bedtime As " Needed    acyclovir 400 MG tablet   Commonly known as:  ZOVIRAX   Take 1 tablet (400 mg) by mouth 2 times daily Viral Prophylaxis.                                ASPIRIN NOT PRESCRIBED   Commonly known as:  INTENTIONAL   Antiplatelet medication not prescribed intentionally due to Current anticoagulant therapy (warfarin/enoxaparin)                                * ATIVAN 0.5 MG tablet   1 hour prior to MRI take 1 tablet (0.5 mg) and may repeat x's 1.   Generic drug:  LORazepam                                * LORazepam 0.5 MG tablet   Commonly known as:  ATIVAN   Take 1 tablet (0.5 mg) by mouth every 8 hours as needed for anxiety                                CALCIUM CITRATE + PO   Take 2,000 mg by mouth 2 tabs                                carboxymethylcellulose 0.5 % Soln ophthalmic solution   Commonly known as:  REFRESH PLUS   1 drop 4 times daily                                CLARINEX PO   Taking claritin                                COMPAZINE PO   Take 10 mg by mouth                                cycloSPORINE 0.05 % ophthalmic emulsion   Commonly known as:  RESTASIS   Place 1 drop into both eyes every 12 hours                                DAILY MULTIVITAMIN PO   Take 1 tablet by mouth daily.                                erythromycin ophthalmic ointment   Commonly known as:  ROMYCIN   Place 1 Application into both eyes At Bedtime                                GENTLE STOOL SOFTENER PO   Take 100 mg by mouth daily                                metoprolol 50 MG 24 hr tablet   Commonly known as:  TOPROL-XL   Take 1 tablet (50 mg) by mouth 2 times daily                                * OXYCODONE HCL PO   Take by mouth as needed                                * oxyCODONE 15 MG IR tablet   Commonly known as:  ROXICODONE   Take 1 tablet (15 mg) by mouth every 8 hours as needed for pain maximum 4 tablet(s) per day                                polyethylene glycol powder   Commonly known as:  MIRALAX/GLYCOLAX    Take 1 capful by mouth daily                                timolol 0.25 % ophthalmic solution   Commonly known as:  TIMOPTIC   1 drop every morning                                TYLENOL PO   Take 500 mg by mouth every 6 hours as needed for mild pain or fever                                UNABLE TO FIND   MEDICATION NAME: Fresh Coat eye drops                                VITAMIN D3 PO   Take 1,000 Units by mouth daily                                warfarin 4 MG tablet   Commonly known as:  COUMADIN   Take 6 mg on Mon, Wed, Fri; 4 mg all other days or as directed by INR clinic.                                ZOMETA IV   Inject into the vein every 30 days Every 3 month dosing                                * Notice:  This list has 4 medication(s) that are the same as other medications prescribed for you. Read the directions carefully, and ask your doctor or other care provider to review them with you.

## 2017-05-30 NOTE — IP AVS SNAPSHOT
[facility] Interventional Radiology    [address] [place] [address]    Phone:  [phone]                                       After Visit Summary   5/30/2017    Amira Arreola    MRN: [id number]           After Visit Summary Signature Page     I have received my discharge instructions, and my questions have been answered. I have discussed any challenges I see with this plan with the nurse or doctor.    ..........................................................................................................................................  Patient/Patient Representative Signature      ..........................................................................................................................................  Patient Representative Print Name and Relationship to Patient    ..................................................               ................................................  Date                                            Time    ..........................................................................................................................................  Reviewed by Signature/Title    ...................................................              ..............................................  Date                                                            Time

## 2017-05-30 NOTE — PROGRESS NOTES
Interventional Radiology Intra-procedural Nursing Note    Patient Name: Amira Arreola  Medical Record Number: [id number]  Today's Date: May 30, 2017    Start Time:  0814  End of procedure time:  0900  Procedure:  Right portacath placement  Report given to:  Marychuy WARREN [facility] RN  Time pt departs:  0910  :  N/A    Other Notes:   Patient into IR #2 at 0800.  900mg Clindamycin infusing.   Consent verified.  Monitor reads Atrial Fibrillation with Rapid Ventricular Response rate 100-150.  O2 sats and BP stable.  Patient  prepped and draped in supine position with 2% Chlorhexidine.  Portacath placed by MD Ada.  Received 1mg Versed and 50mcg Fentanyl for sedation during procedure.  Portacath heparinized with 500u Heparin and confirmed ready for use by fluoroscopy.  Transported back to [facility] in stable condition by IR RN and bedside handoff report performed.     Emely Leon,   RN, BSN, CCRN

## 2017-05-30 NOTE — PROCEDURES
RADIOLOGY PROCEDURE NOTE  Patient name: Amira Arreola  MRN: [id number]  : 1932    Pre-procedure diagnosis: IV access for chemotherapy  Post-procedure diagnosis: Same    Procedure Date/Time: May 30, 2017  9:46 AM  Procedure: Right IJ power port and catheter with tip at RA/SVC junction.  Aspirates well and flushed with heparin.  No complications.  Ready for immediate use.    Estimated blood loss: 5 ml  Specimen(s) collected with description: None  The patient tolerated the procedure well with no immediate complications.  Significant findings:  Please see above.    See imaging dictation for procedural details.    Provider name: Bhupinder Perez  Assistant(s):None

## 2017-05-30 NOTE — PROGRESS NOTES
Admitted for port placement. Denies c/o. States understanding of procedure.  at bedside.    0910 Back to room post procedure. VSS. Denies pain. Right port and neck incision site D/I. No bleeding/swelling at site.     0945 Pt tolerated toast and juice. IV DCD. Discharge instructions done with pt and . States understanding. Purple book, ID card given to pt.

## 2017-05-31 ENCOUNTER — INFUSION THERAPY VISIT (OUTPATIENT)
Dept: INFUSION THERAPY | Facility: CLINIC | Age: 82
End: 2017-05-31
Attending: INTERNAL MEDICINE
Payer: MEDICARE

## 2017-05-31 ENCOUNTER — HOSPITAL ENCOUNTER (OUTPATIENT)
Facility: CLINIC | Age: 82
Setting detail: SPECIMEN
Discharge: HOME | End: 2017-05-31
Attending: INTERNAL MEDICINE | Admitting: INTERNAL MEDICINE
Payer: MEDICARE

## 2017-05-31 VITALS
HEIGHT: 66 IN | RESPIRATION RATE: 16 BRPM | HEART RATE: 137 BPM | SYSTOLIC BLOOD PRESSURE: 112 MMHG | BODY MASS INDEX: 22.47 KG/M2 | OXYGEN SATURATION: 97 % | DIASTOLIC BLOOD PRESSURE: 68 MMHG | TEMPERATURE: 98.1 F | WEIGHT: 139.8 LBS

## 2017-05-31 DIAGNOSIS — C90.00 MULTIPLE MYELOMA NOT HAVING ACHIEVED REMISSION (H): Primary | ICD-10-CM

## 2017-05-31 DIAGNOSIS — I48.20 CHRONIC ATRIAL FIBRILLATION (H): ICD-10-CM

## 2017-05-31 LAB
ABO + RH BLD: NORMAL
ABO + RH BLD: NORMAL
ALBUMIN SERPL-MCNC: 3.1 G/DL (ref 3.4–5)
ALP SERPL-CCNC: 50 U/L (ref 40–150)
ALT SERPL W P-5'-P-CCNC: 58 U/L (ref 0–50)
AST SERPL W P-5'-P-CCNC: 39 U/L (ref 0–45)
BASOPHILS # BLD AUTO: 0 10E9/L (ref 0–0.2)
BASOPHILS NFR BLD AUTO: 0 %
BILIRUB DIRECT SERPL-MCNC: 0.1 MG/DL (ref 0–0.2)
BILIRUB SERPL-MCNC: 0.4 MG/DL (ref 0.2–1.3)
BLD GP AB SCN SERPL QL: NORMAL
BLOOD BANK CMNT PATIENT-IMP: NORMAL
DIFFERENTIAL METHOD BLD: ABNORMAL
EOSINOPHIL # BLD AUTO: 0.1 10E9/L (ref 0–0.7)
EOSINOPHIL NFR BLD AUTO: 1.1 %
ERYTHROCYTE [DISTWIDTH] IN BLOOD BY AUTOMATED COUNT: 15.4 % (ref 10–15)
HCT VFR BLD AUTO: 33.2 % (ref 35–47)
HGB BLD-MCNC: 11.1 G/DL (ref 11.7–15.7)
IMM GRANULOCYTES # BLD: 0 10E9/L (ref 0–0.4)
IMM GRANULOCYTES NFR BLD: 0.5 %
LYMPHOCYTES # BLD AUTO: 0.8 10E9/L (ref 0.8–5.3)
LYMPHOCYTES NFR BLD AUTO: 18.1 %
MCH RBC QN AUTO: 34.4 PG (ref 26.5–33)
MCHC RBC AUTO-ENTMCNC: 33.4 G/DL (ref 31.5–36.5)
MCV RBC AUTO: 103 FL (ref 78–100)
MONOCYTES # BLD AUTO: 0.4 10E9/L (ref 0–1.3)
MONOCYTES NFR BLD AUTO: 10.1 %
NEUTROPHILS # BLD AUTO: 3.1 10E9/L (ref 1.6–8.3)
NEUTROPHILS NFR BLD AUTO: 70.2 %
NRBC # BLD AUTO: 0 10*3/UL
NRBC BLD AUTO-RTO: 0 /100
PLATELET # BLD AUTO: 117 10E9/L (ref 150–450)
PROT SERPL-MCNC: 5.3 G/DL (ref 6.8–8.8)
RBC # BLD AUTO: 3.23 10E12/L (ref 3.8–5.2)
SPECIMEN EXP DATE BLD: NORMAL
WBC # BLD AUTO: 4.4 10E9/L (ref 4–11)

## 2017-05-31 PROCEDURE — 80076 HEPATIC FUNCTION PANEL: CPT | Performed by: INTERNAL MEDICINE

## 2017-05-31 PROCEDURE — 25000128 H RX IP 250 OP 636: Performed by: INTERNAL MEDICINE

## 2017-05-31 PROCEDURE — 96413 CHEMO IV INFUSION 1 HR: CPT

## 2017-05-31 PROCEDURE — 96375 TX/PRO/DX INJ NEW DRUG ADDON: CPT

## 2017-05-31 PROCEDURE — 85025 COMPLETE CBC W/AUTO DIFF WBC: CPT | Performed by: INTERNAL MEDICINE

## 2017-05-31 PROCEDURE — 96367 TX/PROPH/DG ADDL SEQ IV INF: CPT

## 2017-05-31 PROCEDURE — 86900 BLOOD TYPING SEROLOGIC ABO: CPT | Performed by: INTERNAL MEDICINE

## 2017-05-31 PROCEDURE — 96401 CHEMO ANTI-NEOPL SQ/IM: CPT

## 2017-05-31 PROCEDURE — 96415 CHEMO IV INFUSION ADDL HR: CPT

## 2017-05-31 PROCEDURE — 25000132 ZZH RX MED GY IP 250 OP 250 PS 637: Mod: GY | Performed by: INTERNAL MEDICINE

## 2017-05-31 PROCEDURE — 86901 BLOOD TYPING SEROLOGIC RH(D): CPT | Performed by: INTERNAL MEDICINE

## 2017-05-31 PROCEDURE — 86850 RBC ANTIBODY SCREEN: CPT | Performed by: INTERNAL MEDICINE

## 2017-05-31 PROCEDURE — A9270 NON-COVERED ITEM OR SERVICE: HCPCS | Mod: GY | Performed by: INTERNAL MEDICINE

## 2017-05-31 RX ORDER — DEXAMETHASONE 4 MG/1
TABLET ORAL
Qty: 28 TABLET | Refills: 0 | Status: SHIPPED | OUTPATIENT
Start: 2017-05-31 | End: 2017-09-06

## 2017-05-31 RX ORDER — LIDOCAINE/PRILOCAINE 2.5 %-2.5%
CREAM (GRAM) TOPICAL PRN
Qty: 30 G | Refills: 1 | Status: SHIPPED | OUTPATIENT
Start: 2017-05-31 | End: 2018-04-05

## 2017-05-31 RX ORDER — SODIUM CHLORIDE 9 MG/ML
1000 INJECTION, SOLUTION INTRAVENOUS CONTINUOUS PRN
Status: DISCONTINUED | OUTPATIENT
Start: 2017-05-31 | End: 2017-05-31 | Stop reason: HOSPADM

## 2017-05-31 RX ORDER — HEPARIN SODIUM (PORCINE) LOCK FLUSH IV SOLN 100 UNIT/ML 100 UNIT/ML
5 SOLUTION INTRAVENOUS EVERY 8 HOURS
Status: DISCONTINUED | OUTPATIENT
Start: 2017-05-31 | End: 2017-05-31 | Stop reason: HOSPADM

## 2017-05-31 RX ORDER — LIDOCAINE/PRILOCAINE 2.5 %-2.5%
CREAM (GRAM) TOPICAL PRN
Qty: 30 G | Refills: 1 | Status: SHIPPED | OUTPATIENT
Start: 2017-05-31 | End: 2017-05-31

## 2017-05-31 RX ORDER — DIPHENHYDRAMINE HYDROCHLORIDE 50 MG/ML
50 INJECTION INTRAMUSCULAR; INTRAVENOUS
Status: COMPLETED | OUTPATIENT
Start: 2017-05-31 | End: 2017-05-31

## 2017-05-31 RX ORDER — METHYLPREDNISOLONE SODIUM SUCCINATE 125 MG/2ML
125 INJECTION, POWDER, LYOPHILIZED, FOR SOLUTION INTRAMUSCULAR; INTRAVENOUS
Status: DISCONTINUED | OUTPATIENT
Start: 2017-05-31 | End: 2017-05-31 | Stop reason: HOSPADM

## 2017-05-31 RX ORDER — ACETAMINOPHEN 325 MG/1
650 TABLET ORAL ONCE
Status: COMPLETED | OUTPATIENT
Start: 2017-05-31 | End: 2017-05-31

## 2017-05-31 RX ADMIN — SODIUM CHLORIDE 250 ML: 9 INJECTION, SOLUTION INTRAVENOUS at 09:21

## 2017-05-31 RX ADMIN — BORTEZOMIB 2.2 MG: 3.5 INJECTION, POWDER, LYOPHILIZED, FOR SOLUTION INTRAVENOUS; SUBCUTANEOUS at 09:59

## 2017-05-31 RX ADMIN — DIPHENHYDRAMINE HYDROCHLORIDE 50 MG: 50 INJECTION, SOLUTION INTRAMUSCULAR; INTRAVENOUS at 09:39

## 2017-05-31 RX ADMIN — ACETAMINOPHEN 650 MG: 325 TABLET ORAL at 09:21

## 2017-05-31 RX ADMIN — DIPHENHYDRAMINE HYDROCHLORIDE 25 MG: 50 INJECTION, SOLUTION INTRAMUSCULAR; INTRAVENOUS at 11:52

## 2017-05-31 RX ADMIN — DEXAMETHASONE SODIUM PHOSPHATE 20 MG: 10 INJECTION, SOLUTION INTRAMUSCULAR; INTRAVENOUS at 09:22

## 2017-05-31 RX ADMIN — SODIUM CHLORIDE, PRESERVATIVE FREE 5 ML: 5 INJECTION INTRAVENOUS at 17:36

## 2017-05-31 RX ADMIN — METHYLPREDNISOLONE SODIUM SUCCINATE 125 MG: 125 INJECTION, POWDER, FOR SOLUTION INTRAMUSCULAR; INTRAVENOUS at 11:29

## 2017-05-31 RX ADMIN — DARATUMUMAB 1000 MG: 100 INJECTION, SOLUTION, CONCENTRATE INTRAVENOUS at 10:00

## 2017-05-31 RX ADMIN — SODIUM CHLORIDE 1000 ML: 9 INJECTION, SOLUTION INTRAVENOUS at 11:29

## 2017-05-31 NOTE — MR AVS SNAPSHOT
After Visit Summary   5/31/2017    Amira Arreola    MRN: [id number]           Patient Information     Date Of Birth          7/17/1932        Visit Information        Provider Department      5/31/2017 8:00 AM  INFUSION CHAIR 11 [facility]        Today's Diagnoses     Multiple myeloma not having achieved remission (H)    -  1    Chronic atrial fibrillation (H)          Care Instructions    [facility]  [address]  [address]  [phone]    Friday June 2 at 11:00am  Dr. Rivera          Follow-ups after your visit        Additional Services     CARDIOLOGY EVAL ADULT REFERRAL       Your provider has referred you to:  [facility]: [facility] [phone]   [email or web address]    Please be aware that coverage of these services is subject to the terms and limitations of your health insurance plan.  Call member services at your health plan with any benefit or coverage questions.      Type of Referral:  New Cardiology Consult    Timeframe requested:  1-3 Days    Please bring the following to your appointment:  >>   Any x-rays, CTs or MRIs which have been performed.  Contact the facility where they were done to arrange for  prior to your scheduled appointment.    >>   List of current medications  >>   This referral request   >>   Any documents/labs given to you for this referral                  Your next 10 appointments already scheduled     Jun 02, 2017  9:30 AM CDT   Anticoagulation Visit with  [place]ICOAGULATION CLINIC   [facility] [place]    [address] [place] [id number] [phone]            Jun 02, 2017 11:00 AM CDT   New Visit with Ramos Rivera MD   [facility] [place] ([facility])    [address]  [facility] [address] [phone]             Jun 07, 2017  8:00 AM CDT   Level 7 with SH INFUSION CHAIR 11   [facility] ([facility])    [facility]  [address]  [place] [address]   [phone]            Jun 14, 2017  8:30 AM CDT   Level 7 with SH INFUSION CHAIR 12   [facility] ([facility])    [place] [id number]  [place] [address]   [phone]            Jun 21, 2017  9:00 AM CDT   Level 7 with SH INFUSION CHAIR 15   [facility] ([facility])    [facility] [place]  [address]  [place] [address]   [phone]            Jun 21, 2017 10:00 AM CDT   Return Visit with Shayne Roberts MD   [facility] ([facility])    [place] [id number]  [place] [address]   [phone]              Who to contact     If you have questions or need follow up information about today's clinic visit or your schedule please contact [facility] directly at [phone].  Normal or non-critical lab and imaging results will be communicated to you by [employer], letter or phone within 4 business days after the clinic has received the results. If you do not hear from us within 7 days, please contact the clinic through [employer] or phone. If you have a critical or abnormal lab result, we will notify you by phone as soon as possible.  Submit refill requests through [employer] or call your pharmacy and they will forward the refill request to us. Please allow 3 business days for your refill to be completed.          Additional Information About Your Visit        [employer] Information     [employer] lets you send messages to your doctor, view your test results, renew your prescriptions, schedule appointments and more. To sign up, go to  [email or web address] . Click on \"Log in\" on the left side of the screen, which will take you to the Welcome page. Then click on \"Sign up Now\" on the right side of the page.     You will be asked to enter the access code listed below, as well as some personal information. Please follow the directions to create your username and password.     Your access code is: [email or web address]  Expires: 2017 11:09 AM     Your access code will  in 90 days. If you need help or a new code, please call your [facility] clinic or [phone].        Care EveryWhere ID     This is your Care EveryWhere ID. This could be used by other organizations to access your [facility] medical records  [id number]        Your Vitals Were     Pulse Temperature Respirations Height Pulse Oximetry BMI (Body Mass Index)    72 98.1  F (36.7  C) (Oral) 16 1.676 m (5' 5.98\") 97% 22.58 kg/m2       Blood Pressure from Last 3 Encounters:   17 110/69   17 93/66   17 110/75    Weight from Last 3 Encounters:   17 63.4 kg (139 lb 12.8 oz)   17 59.7 kg (131 lb 9.6 oz)   17 59.7 kg (131 lb 9.6 oz)              We Performed the Following     ABO/Rh type and screen     CARDIOLOGY EVAL ADULT REFERRAL     CBC with platelets differential     Hepatic panel          Today's Medication Changes          These changes are accurate as of: 17  4:57 PM.  If you have any questions, ask your nurse or doctor.               Start taking these medicines.        Dose/Directions    dexamethasone 4 MG tablet   Commonly known as:  DECADRON   Used for:  Multiple myeloma not having achieved remission (H)        Take 20mg (5 tabs) by mouth every week the morning of velcade injection. Then take 1 tab (4mg) by mouth for two days after darzalex infusion.   Quantity:  28 tablet   Refills:  0       lidocaine-prilocaine cream   Commonly known as:  EMLA   Used for:  Multiple myeloma not having achieved remission (H)        Apply topically as needed for " moderate pain Apply dollop size amount to port site 30-60 min prior to accessing   Quantity:  30 g   Refills:  1            Where to get your medicines      These medications were sent to [id number] - [place], [place] - [address] [facility] [address]  [address], [place] [address]     Phone:  [phone]     lidocaine-prilocaine cream         Some of these will need a paper prescription and others can be bought over the counter.  Ask your nurse if you have questions.     Bring a paper prescription for each of these medications     dexamethasone 4 MG tablet                Primary Care Provider Office Phone # Fax #    Addy Frias [phone]       [facility] [address] [facility] [address]  [facility] [address]        Thank you!     Thank you for choosing [facility]  for your care. Our goal is always to provide you with excellent care. Hearing back from our patients is one way we can continue to improve our services. Please take a few minutes to complete the written survey that you may receive in the mail after your visit with us. Thank you!             Your Updated Medication List - Protect others around you: Learn how to safely use, store and throw away your medicines at www.disposemymeds.org.          This list is accurate as of: 5/31/17  4:57 PM.  Always use your most recent med list.                   Brand Name Dispense Instructions for use    acyclovir 400 MG tablet    ZOVIRAX    60 tablet    Take 1 tablet (400 mg) by mouth 2 times daily Viral Prophylaxis.       ASPIRIN NOT PRESCRIBED    INTENTIONAL    0 each    Antiplatelet medication not prescribed intentionally due to Current anticoagulant therapy (warfarin/enoxaparin)       * ATIVAN 0.5 MG tablet   Generic drug:  LORazepam     10 tablet    1 hour prior to MRI take 1 tablet (0.5 mg) and may repeat x's 1.       * LORazepam 0.5 MG tablet    ATIVAN    20 tablet     Take 1 tablet (0.5 mg) by mouth every 8 hours as needed for anxiety       CALCIUM CITRATE + PO      Take 2,000 mg by mouth 2 tabs       carboxymethylcellulose 0.5 % Soln ophthalmic solution    REFRESH PLUS     1 drop 4 times daily       CLARINEX PO      Taking claritin       COMPAZINE PO      Take 10 mg by mouth       cycloSPORINE 0.05 % ophthalmic emulsion    RESTASIS     Place 1 drop into both eyes every 12 hours       DAILY MULTIVITAMIN PO      Take 1 tablet by mouth daily.       dexamethasone 4 MG tablet    DECADRON    28 tablet    Take 20mg (5 tabs) by mouth every week the morning of velcade injection. Then take 1 tab (4mg) by mouth for two days after darzalex infusion.       erythromycin ophthalmic ointment    ROMYCIN     Place 1 Application into both eyes At Bedtime       GENTLE STOOL SOFTENER PO      Take 100 mg by mouth daily       lidocaine-prilocaine cream    EMLA    30 g    Apply topically as needed for moderate pain Apply dollop size amount to port site 30-60 min prior to accessing       metoprolol 50 MG 24 hr tablet    TOPROL-XL    180 tablet    Take 1 tablet (50 mg) by mouth 2 times daily       * OXYCODONE HCL PO      Take by mouth as needed       * oxyCODONE 15 MG IR tablet    ROXICODONE    90 tablet    Take 1 tablet (15 mg) by mouth every 8 hours as needed for pain maximum 4 tablet(s) per day       polyethylene glycol powder    MIRALAX/GLYCOLAX     Take 1 capful by mouth daily       timolol 0.25 % ophthalmic solution    TIMOPTIC     1 drop every morning       TYLENOL PO      Take 500 mg by mouth every 6 hours as needed for mild pain or fever       UNABLE TO FIND      MEDICATION NAME: Fresh Coat eye drops       VITAMIN D3 PO      Take 1,000 Units by mouth daily       warfarin 4 MG tablet    COUMADIN    120 tablet    Take 6 mg on Mon, Wed, Fri; 4 mg all other days or as directed by INR clinic.       ZOMETA IV      Inject into the vein every 30 days Every 3 month dosing       * Notice:  This list has 4  medication(s) that are the same as other medications prescribed for you. Read the directions carefully, and ask your doctor or other care provider to review them with you.

## 2017-05-31 NOTE — PATIENT INSTRUCTIONS
[facility]  [address]  [address]  [phone]    Friday June 2 at 11:00am  Dr. Rivera

## 2017-05-31 NOTE — PROGRESS NOTES
Infusion Nursing Note:  Amira Arreola presents today for C1D1 Velcade/Darzalex.    Patient seen by provider today: No   present during visit today: Not Applicable.    Note: Darzalex started at 1000.  Vitals at 1036 ; HR irregular between .  Patient feeling fine and all other vitals stable.  Patient has a history of a-fib, takes metoprolol BID, but has been off her coumadin until this morning for her port placement.  Vitals at 1055 .  Darzalex stopped and Dr. Roberts notified.  Per Dr. Roberts, ok to continue with infusion, but pt needs to see cardiology within the next 2 days.  Cardiology scheduled for Friday June 2 at 11:00am.      1125:  Patient coughing and complaining of tightness in her throat.  Darzalex stopped, NS wide open and 125mg Solumedrol given IVP.  Vitals stable and patient speaking appropriately.  Dr. Roberts notified and aware.  Darzalex on hold until symptoms resolve.     1152:  Patient feeling better, but still having some slight tightness in her throat.  Pt speaking and eating ok.  An additional 25mg benadryl given IVP.      1206:  Pt feeling better and symptoms resolved.  Darzalex restarted at 50cc/hour.    1440:  Patient continues to tolerate Darzalex and rate increased per protocol.  Patient reaches max rate of 200cc/hour.  David Kearney RN      Intravenous Access:  Labs drawn without difficulty.  Implanted Port.    Treatment Conditions:  Lab Results   Component Value Date    HGB 11.1 05/31/2017     Lab Results   Component Value Date    WBC 4.4 05/31/2017      Lab Results   Component Value Date    ANEU 3.1 05/31/2017     Lab Results   Component Value Date     05/31/2017      Lab Results   Component Value Date     05/16/2017                   Lab Results   Component Value Date    POTASSIUM 4.1 05/16/2017           No results found for: MAG         Lab Results   Component Value Date    CR 0.54 05/23/2017                   Lab Results   Component Value Date     BRADLEY 9.4 05/23/2017                Lab Results   Component Value Date    BILITOTAL 0.4 05/31/2017           Lab Results   Component Value Date    ALBUMIN 3.1 05/31/2017                    Lab Results   Component Value Date    ALT 58 05/31/2017           Lab Results   Component Value Date    AST 39 05/31/2017     Results reviewed, labs MET treatment parameters, ok to proceed with treatment.      Post Infusion Assessment:  Patient tolerated infusion poorly due to - see above note - tolerated remainder of infusion well.  Blood return noted pre and post infusion.  Site patent and intact, free from redness, edema or discomfort.  No evidence of extravasations.  Access discontinued per protocol.    Discharge Plan:   Prescription refills given for Emla Cream and Decadron.  Patient states she already has compazine, acyclovir and ativan at home.  Discharge instructions reviewed with: Patient.  Patient and/or family verbalized understanding of discharge instructions and all questions answered.  Copy of AVS reviewed with patient and/or family.  Patient will return 6/7/17 for next appointment.  Patient discharged in stable condition accompanied by: self.  Departure Mode: Ambulatory.    SHELLIE Castro RN (discharge)

## 2017-06-02 ENCOUNTER — OFFICE VISIT (OUTPATIENT)
Dept: CARDIOLOGY | Facility: CLINIC | Age: 82
End: 2017-06-02
Attending: INTERNAL MEDICINE
Payer: COMMERCIAL

## 2017-06-02 ENCOUNTER — ANTICOAGULATION THERAPY VISIT (OUTPATIENT)
Dept: NURSING | Facility: CLINIC | Age: 82
End: 2017-06-02
Payer: COMMERCIAL

## 2017-06-02 VITALS
HEART RATE: 72 BPM | WEIGHT: 145.8 LBS | HEIGHT: 66 IN | DIASTOLIC BLOOD PRESSURE: 72 MMHG | SYSTOLIC BLOOD PRESSURE: 136 MMHG | BODY MASS INDEX: 23.43 KG/M2

## 2017-06-02 DIAGNOSIS — I47.10 SVT (SUPRAVENTRICULAR TACHYCARDIA) (H): ICD-10-CM

## 2017-06-02 DIAGNOSIS — I77.810 ASCENDING AORTA DILATATION (H): Primary | ICD-10-CM

## 2017-06-02 DIAGNOSIS — Z79.01 LONG-TERM (CURRENT) USE OF ANTICOAGULANTS: ICD-10-CM

## 2017-06-02 DIAGNOSIS — I48.0 PAROXYSMAL ATRIAL FIBRILLATION (H): ICD-10-CM

## 2017-06-02 LAB — INR POINT OF CARE: 1.4 (ref 0.86–1.14)

## 2017-06-02 PROCEDURE — 93000 ELECTROCARDIOGRAM COMPLETE: CPT | Performed by: INTERNAL MEDICINE

## 2017-06-02 PROCEDURE — 99204 OFFICE O/P NEW MOD 45 MIN: CPT | Mod: 25 | Performed by: INTERNAL MEDICINE

## 2017-06-02 PROCEDURE — 36416 COLLJ CAPILLARY BLOOD SPEC: CPT

## 2017-06-02 PROCEDURE — 85610 PROTHROMBIN TIME: CPT | Mod: QW

## 2017-06-02 RX ORDER — FUROSEMIDE 20 MG
20 TABLET ORAL DAILY
Qty: 90 TABLET | Refills: 3 | Status: SHIPPED | OUTPATIENT
Start: 2017-06-02 | End: 2017-06-23

## 2017-06-02 NOTE — MR AVS SNAPSHOT
Amira Arreola   6/2/2017 9:30 AM   Anticoagulation Therapy Visit    Description:  84 year old female   Provider:  [facility]   Department:  [facility]           INR as of 6/2/2017     Today's INR 1.4!      Anticoagulation Summary as of 6/2/2017     INR goal 2.0-3.0   Today's INR 1.4!   Full instructions 6 mg on Mon, Wed, Fri; 4 mg all other days   Next INR check 6/5/2017    Indications   Long-term (current) use of anticoagulants [Z79.01] [Z79.01]  Atrial fibrillation (H) [I48.91] (Resolved) [I48.91]         Description     Patient had intentional hold 5/24-5/29 due to Port insertion      Your next Anticoagulation Clinic appointment(s)     Jun 05, 2017 11:00 AM CDT   Anticoagulation Visit with [facility]   [facility] ([facility])    [address] [place] [address]   [phone]              Contact Numbers     Clinic Number:         June 2017 Details    Sun Mon Tue Wed Thu Fri Sat         1               2      6 mg   See details      3      4 mg           4      4 mg         5            6               7               8               9               10                 11               12               13               14               15               16               17                 18               19               20               21               22               23               24                 25               26               27               28               29               30                 Date Details   06/02 This INR check       Date of next INR:  6/5/2017         How to take your warfarin dose     To take:  4 mg Take 1 of the 4 mg tablets.    To take:  6 mg Take 1.5 of the 4 mg tablets.

## 2017-06-02 NOTE — PROGRESS NOTES
ANTICOAGULATION FOLLOW-UP CLINIC VISIT    Patient Name:  Amira Arreola  Date:  6/2/2017  Contact Type:  Face to Face    SUBJECTIVE:     Patient Findings     Comments Intentional hold of therapy - had Port put in Monday. Had chemotherapy on Wed 5/31. Received Zometa and Daratumumab. Up to date shows no known drug interaction with these chemotherapy agents.  Patient states that she is feeling more short of breath today. Vital signs checked: O2 sat  99%- HR 72 RR 24, /74. Patient breathing improved as she sat quietly during her visit. No complaints of feeling light headed. States that she has appt with cardiology a 11 this morning. Patient states that her new chemotherapy can affect her heart rate.           OBJECTIVE    INR Protime   Date Value Ref Range Status   06/02/2017 1.4 (A) 0.86 - 1.14 Final       ASSESSMENT / PLAN  INR assessment SUB    Recheck INR In: 3 DAYS    INR Location Clinic      Anticoagulation Summary as of 6/2/2017     INR goal 2.0-3.0   Today's INR 1.4!   Maintenance plan 6 mg (4 mg x 1.5) on Mon, Wed, Fri; 4 mg (4 mg x 1) all other days   Full instructions 6 mg on Mon, Wed, Fri; 4 mg all other days   Weekly total 34 mg   No change documented Eufemia Boateng, RN   Plan last modified Dayana Barrera, RN (3/10/2017)   Next INR check 6/5/2017   Target end date Indefinite    Indications   Long-term (current) use of anticoagulants [Z79.01] [Z79.01]  Atrial fibrillation (H) [I48.91] (Resolved) [I48.91]         Anticoagulation Episode Summary     INR check location     Preferred lab     Send INR reminders to  [facility]    Comments PATIENT TAKES WARFARIN IN THE MORNING      Anticoagulation Care Providers     Provider Role Specialty Phone number    Addy Frias MD [facility] Internal Medicine [phone]            See the Encounter Report to view Anticoagulation Flowsheet and Dosing Calendar (Go to Encounters tab in chart review, and find the Anticoagulation Therapy Visit)    Dosage  adjustment made based on physician directed care plan.    Eufemia Boateng RN

## 2017-06-02 NOTE — MR AVS SNAPSHOT
After Visit Summary   6/2/2017    Amira Arreola    MRN: [id number]           Patient Information     Date Of Birth          7/17/1932        Visit Information        Provider Department      6/2/2017 11:00 AM Ramos Rivera MD [facility] [place]        Today's Diagnoses     Ascending aorta dilatation (H)    -  1    SVT (supraventricular tachycardia) (H)        Paroxysmal atrial fibrillation (H)          Care Instructions    Take lasix 20 mg qd in am.          Follow-ups after your visit        Additional Services     Follow-Up with Cardiologist                 Your next 10 appointments already scheduled     Jun 05, 2017 11:00 AM CDT   Anticoagulation Visit with [facility]UL[facility] CL[facility]IC   [facility] ([facility])    [address] [place] [address]   [phone]            Jun 07, 2017  8:00 AM CDT   Level 7 with  INFUSION CHAIR 11   [facility] ([facility])    [facility] [place] [id number]  [facility] [address]   [phone]            Jun 14, 2017  8:30 AM CDT   Level 7 with  INFUSION CHAIR 12   [facility] ([facility])    [facility]  [address]  [facility] [address]   [phone]            Jun 21, 2017  9:00 AM CDT   Level 7 with  INFUSION CHAIR 15   [facility] ([facility])    [facility]  [address]  [facility] [address]   [phone]            Jun 21, 2017 10:00 AM CDT   Return Visit with Shayne Roberts MD   [facility] ([facility])    [facility]  [address]  [facility] [address]   [phone]              Future tests that were ordered for you today     Open Future Orders        Priority  "Expected Expires Ordered    Follow-Up with Cardiologist Routine 7/10/2017 2018 2017    Echocardiogram Routine 2017    Basic metabolic panel Routine 2017            Who to contact     If you have questions or need follow up information about today's clinic visit or your schedule please contact [facility] [place] directly at [phone].  Normal or non-critical lab and imaging results will be communicated to you by [employer], letter or phone within 4 business days after the clinic has received the results. If you do not hear from us within 7 days, please contact the clinic through [employer] or phone. If you have a critical or abnormal lab result, we will notify you by phone as soon as possible.  Submit refill requests through [employer] or call your pharmacy and they will forward the refill request to us. Please allow 3 business days for your refill to be completed.          Additional Information About Your Visit        [employer] Information     [employer] lets you send messages to your doctor, view your test results, renew your prescriptions, schedule appointments and more. To sign up, go to [email or web address] . Click on \"Log in\" on the left side of the screen, which will take you to the Welcome page. Then click on \"Sign up Now\" on the right side of the page.     You will be asked to enter the access code listed below, as well as some personal information. Please follow the directions to create your username and password.     Your access code is: [email or web address]  Expires: 2017 11:09 AM     Your access code will  in 90 days. If you need help or a new code, please call your [place] clinic or [phone].        Care EveryWhere ID     This is your Care EveryWhere ID. This could be used by other organizations to access your [place] medical records  [id number]        Your Vitals Were     Pulse Height BMI (Body Mass Index)       " "      100 1.676 m (5' 6\") 23.53 kg/m2          Blood Pressure from Last 3 Encounters:   06/02/17 136/72   05/31/17 112/68   05/30/17 93/66    Weight from Last 3 Encounters:   06/02/17 66.1 kg (145 lb 12.8 oz)   05/31/17 63.4 kg (139 lb 12.8 oz)   05/23/17 59.7 kg (131 lb 9.6 oz)              We Performed the Following     EKG 12-lead complete w/read - Clinics (performed today)          Today's Medication Changes          These changes are accurate as of: 6/2/17 11:55 AM.  If you have any questions, ask your nurse or doctor.               Start taking these medicines.        Dose/Directions    furosemide 20 MG tablet   Commonly known as:  LASIX   Used for:  Paroxysmal atrial fibrillation (H)   Started by:  Ramos Rivera MD        Dose:  20 mg   Take 1 tablet (20 mg) by mouth daily   Quantity:  90 tablet   Refills:  3            Where to get your medicines      These medications were sent to [id number] - [place], [place] - [address] AT [facility] [address]  [address], [place] [address]     Phone:  [phone]     furosemide 20 MG tablet                Primary Care Provider Office Phone # Fax #    Addy Frias [phone]       [facility] [address] [facility] [address]  [facility] [address]        Thank you!     Thank you for choosing [facility] [place]  for your care. Our goal is always to provide you with excellent care. Hearing back from our patients is one way we can continue to improve our services. Please take a few minutes to complete the written survey that you may receive in the mail after your visit with us. Thank you!             Your Updated Medication List - Protect others around you: Learn how to safely use, store and throw away your medicines at www.disposemymeds.org.          This list is accurate as of: 6/2/17 11:55 AM.  Always use your most recent med list.                   Brand Name Dispense Instructions " for use    acyclovir 400 MG tablet    ZOVIRAX    60 tablet    Take 1 tablet (400 mg) by mouth 2 times daily Viral Prophylaxis.       ASPIRIN NOT PRESCRIBED    INTENTIONAL    0 each    Antiplatelet medication not prescribed intentionally due to Current anticoagulant therapy (warfarin/enoxaparin)       * ATIVAN 0.5 MG tablet   Generic drug:  LORazepam     10 tablet    1 hour prior to MRI take 1 tablet (0.5 mg) and may repeat x's 1.       * LORazepam 0.5 MG tablet    ATIVAN    20 tablet    Take 1 tablet (0.5 mg) by mouth every 8 hours as needed for anxiety       CALCIUM CITRATE + PO      Take 2,000 mg by mouth 2 tabs       carboxymethylcellulose 0.5 % Soln ophthalmic solution    REFRESH PLUS     1 drop 4 times daily       CLARINEX PO      Taking claritin       COMPAZINE PO      Take 10 mg by mouth daily as needed       cycloSPORINE 0.05 % ophthalmic emulsion    RESTASIS     Place 1 drop into both eyes every 12 hours       DAILY MULTIVITAMIN PO      Take 1 tablet by mouth daily.       dexamethasone 4 MG tablet    DECADRON    28 tablet    Take 20mg (5 tabs) by mouth every week the morning of velcade injection. Then take 1 tab (4mg) by mouth for two days after darzalex infusion.       erythromycin ophthalmic ointment    ROMYCIN     Place 1 Application into both eyes At Bedtime       furosemide 20 MG tablet    LASIX    90 tablet    Take 1 tablet (20 mg) by mouth daily       GENTLE STOOL SOFTENER PO      Take 100 mg by mouth daily       lidocaine-prilocaine cream    EMLA    30 g    Apply topically as needed for moderate pain Apply dollop size amount to port site 30-60 min prior to accessing       metoprolol 50 MG 24 hr tablet    TOPROL-XL    180 tablet    Take 1 tablet (50 mg) by mouth 2 times daily       * OXYCODONE HCL PO      Take by mouth as needed       * oxyCODONE 15 MG IR tablet    ROXICODONE    90 tablet    Take 1 tablet (15 mg) by mouth every 8 hours as needed for pain maximum 4 tablet(s) per day       polyethylene  glycol powder    MIRALAX/GLYCOLAX     Take 1 capful by mouth daily as needed       timolol 0.25 % ophthalmic solution    TIMOPTIC     1 drop every morning       TYLENOL PO      Take 500 mg by mouth every 6 hours as needed for mild pain or fever       UNABLE TO FIND      MEDICATION NAME: Fresh Coat eye drops       VITAMIN D3 PO      Take 1,000 Units by mouth daily       warfarin 4 MG tablet    COUMADIN    120 tablet    Take 6 mg on Mon, Wed, Fri; 4 mg all other days or as directed by INR clinic.       ZOMETA IV      Inject into the vein every 30 days Every 3 month dosing       * Notice:  This list has 4 medication(s) that are the same as other medications prescribed for you. Read the directions carefully, and ask your doctor or other care provider to review them with you.

## 2017-06-02 NOTE — PROGRESS NOTES
HPI and Plan:   See dictation(#[id number])    Orders Placed This Encounter   Procedures     Basic metabolic panel     Follow-Up with Cardiologist     EKG 12-lead complete w/read - Clinics (performed today)     Echocardiogram       Orders Placed This Encounter   Medications     furosemide (LASIX) 20 MG tablet     Sig: Take 1 tablet (20 mg) by mouth daily     Dispense:  90 tablet     Refill:  3       There are no discontinued medications.      Encounter Diagnoses   Name Primary?     Ascending aorta dilatation (H) Yes     SVT (supraventricular tachycardia) (H)      Paroxysmal atrial fibrillation (H)        CURRENT MEDICATIONS:  Current Outpatient Prescriptions   Medication Sig Dispense Refill     furosemide (LASIX) 20 MG tablet Take 1 tablet (20 mg) by mouth daily 90 tablet 3     dexamethasone (DECADRON) 4 MG tablet Take 20mg (5 tabs) by mouth every week the morning of velcade injection. Then take 1 tab (4mg) by mouth for two days after darzalex infusion. 28 tablet 0     lidocaine-prilocaine (EMLA) cream Apply topically as needed for moderate pain Apply dollop size amount to port site 30-60 min prior to accessing 30 g 1     oxyCODONE (ROXICODONE) 15 MG IR tablet Take 1 tablet (15 mg) by mouth every 8 hours as needed for pain maximum 4 tablet(s) per day 90 tablet 0     LORazepam (ATIVAN) 0.5 MG tablet Take 1 tablet (0.5 mg) by mouth every 8 hours as needed for anxiety 20 tablet 3     warfarin (COUMADIN) 4 MG tablet Take 6 mg on Mon, Wed, Fri; 4 mg all other days or as directed by INR clinic. 120 tablet 0     Acetaminophen (TYLENOL PO) Take 500 mg by mouth every 6 hours as needed for mild pain or fever       acyclovir (ZOVIRAX) 400 MG tablet Take 1 tablet (400 mg) by mouth 2 times daily Viral Prophylaxis. 60 tablet 11     Zoledronic Acid (ZOMETA IV) Inject into the vein every 30 days Every 3 month dosing       metoprolol (TOPROL-XL) 50 MG 24 hr tablet Take 1 tablet (50 mg) by mouth 2 times daily 180 tablet 3      Desloratadine (CLARINEX PO) Taking claritin       OXYCODONE HCL PO Take by mouth as needed       Prochlorperazine Maleate (COMPAZINE PO) Take 10 mg by mouth daily as needed        LORazepam (ATIVAN) 0.5 MG tablet 1 hour prior to MRI take 1 tablet (0.5 mg) and may repeat x's 1. 10 tablet 0     polyethylene glycol (MIRALAX/GLYCOLAX) powder Take 1 capful by mouth daily as needed        UNABLE TO FIND MEDICATION NAME: Fresh Coat eye drops       timolol (TIMOPTIC) 0.25 % ophthalmic solution 1 drop every morning       carboxymethylcellulose (REFRESH PLUS) 0.5 % SOLN 1 drop 4 times daily       Cholecalciferol (VITAMIN D3 PO) Take 1,000 Units by mouth daily       Calcium Citrate-Vitamin D (CALCIUM CITRATE + PO) Take 2,000 mg by mouth 2 tabs       erythromycin (ROMYCIN) ophthalmic ointment Place 1 Application into both eyes At Bedtime        cycloSPORINE (RESTASIS) 0.05 % ophthalmic emulsion Place 1 drop into both eyes every 12 hours        Docusate Sodium (GENTLE STOOL SOFTENER PO) Take 100 mg by mouth daily        Multiple Vitamin (DAILY MULTIVITAMIN PO) Take 1 tablet by mouth daily.       ASPIRIN NOT PRESCRIBED (INTENTIONAL) Antiplatelet medication not prescribed intentionally due to Current anticoagulant therapy (warfarin/enoxaparin) (Patient not taking: Reported on 6/2/2017) 0 each 0       ALLERGIES     Allergies   Allergen Reactions     Penicillin [Penicillins] Rash     Blotches on chest        PAST MEDICAL HISTORY:  Past Medical History:   Diagnosis Date     Ascending aorta dilatation (H) 4/16    on echo, mild     Cancer, metastatic to bone (H)     due to myeloma     Colonic polyp 2008    adenomatous, fu 2013 tics only     Compression fracture 2016    multiple areas of spine     Dry eyes      Elevated MCV 2015    b12 and folic acid nl     HTN (hypertension) 2000    off meds for years     Menorrhagia 2002    hysteroscopy and d and c done     MGUS (monoclonal gammopathy of unknown significance) 2015    eval by Dr. Roberts      Multiple myeloma (H) 2016    dx  at [place], bone lesions seen on mri      OAB (overactive bladder)     Dr. Grullon     Osteoporosis     fu done  and stable, went off meds then, fu done ; has had gyn fu and added evista  by gyn     Palpitations     nl echo, mildly dilated asc aorta     Paroxysmal atrial fibrillation (H)     had palp and ziopatch showed it, echo nl lv fxn, mild mr and tr, added coum and toprol, toprol dose raised 16     Sciatica of left side     Dr. Helen Ricardo      SVT (supraventricular tachycardia) (H)     on ziopatch     Thrombocytopenia (H)        PAST SURGICAL HISTORY:  Past Surgical History:   Procedure Laterality Date     BONE MARROW BIOPSY, BONE SPECIMEN, NEEDLE/TROCAR N/A 2016    Procedure: BIOPSY BONE MARROW;  Surgeon: Bryan Patel MD;  Location:  GI     CATARACT IOL, RT/LT        SECTION  1965, 1966     COLONOSCOPY  2013    Procedure: COLONOSCOPY;  COLONOSCOPY;  Surgeon: Steffany Rockwell MD;  Location:  GI     EXCISE EXOSTOSIS TIBIA / FIBULA  2014    Procedure: EXCISE EXOSTOSIS TIBIA / FIBULA;  Surgeon: Naila Pichardo MD;  Location:  [facility]     hysteroscopy and d and c      due to bleeding     left anle replacement       right ankle surgery         FAMILY HISTORY:  Family History   Problem Relation Age of Onset     HEART DISEASE Father      C.A.D. Mother      CEREBROVASCULAR DISEASE Brother      Family History Negative Sister      Family History Negative Sister      Family History Negative Brother        SOCIAL HISTORY:  Social History     Social History     Marital status:      Spouse name: Tom     Number of children: 6     Years of education: N/A     Occupational History     rn anesthetist Retired     Social History Main Topics     Smoking status: Never Smoker     Smokeless tobacco: Never Used     Alcohol use No     Drug use: No     Sexual  "activity: No     Other Topics Concern     None     Social History Narrative       Review of Systems:  Skin:  Positive for bruising     Eyes:  Positive for   states has pressure in R eye  ENT:  Positive for   feels fullness in throat  Respiratory:  Positive for dyspnea on exertion     Cardiovascular:  Negative;chest pain;palpitations;lightheadedness;dizziness;cyanosis;syncope or near-syncope Positive for;fatigue;edema;exercise intolerance    Gastroenterology: Positive for poor appetite;constipation abdominal fullness  Genitourinary:  Positive for urinary frequency;nocturia    Musculoskeletal:  Negative      Neurologic:  Negative      Psychiatric:  Positive for anxiety    Heme/Lymph/Imm:  Positive for   multiple myeloma  Endocrine:  Negative        Physical Exam:  Vitals: /72 (BP Location: Left arm, Cuff Size: Adult Regular)  Pulse 72  Ht 1.676 m (5' 6\")  Wt 66.1 kg (145 lb 12.8 oz)  BMI 23.53 kg/m2    Constitutional:           Skin:           Head:           Eyes:           ENT:           Neck:           Chest:             Cardiac:                    Abdomen:           Vascular:                                          Extremities and Back:                 Neurological:                 CC  Shayne Roberts MD  [facility]  [address] [place] [address]                  "

## 2017-06-02 NOTE — LETTER
6/2/2017    Shayne Roberts MD  [facility]  [address]  [place] [address]    RE: Amira Roseon       Dear Colleague,    I had the pleasure of seeing Amira Arreola in the [facility].    REASON FOR CARDIOLOGY VISIT:  Atrial fibrillation.      Ms. 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  The patient is establishing cardiology care with us today.  Recently, about 3 days ago the patient was getting infusion for new chemotherapy, and she was noted to have heart rate in 120s to 130s.  The patient tells me that at that time when she was told that her heart rate was fast, she did feel a little bit of jittery sensation in the chest.  Remarkably, about a year ago patient had Zio Patch monitoring for about 2 weeks, and paroxysmal episodes of atrial fibrillation were detected along with atrial tachycardia, 130 such episodes were detected.  It looks like patient is not significantly symptomatic in her day-to-day activities in terms of any palpitation symptoms.  Presently, her main issue is that she is feeling a sensation of fogginess, not able to concentrate that has been there for now a few weeks.  She has also noticed weight gain over the course of last several months.  It looks like she weighed 130 pounds about 4 months ago.  Now she weighs 145 pounds, and she has also noticed swelling over her lower extremity.  To be noted, she also gets weekly dexamethasone 20 mg.  EKG today shows that she is not in atrial fibrillation.  This is a regular atrial rhythm.  She denies any shortness of breath, any orthopnea, any PND or chest discomfort.  She is presently on 50 mg b.i.d. of Toprol-XL.  She is also on Coumadin, which  was recently stopped due to her Port-A-Cath placement, but she has resumed it a few days ago.      PHYSICAL EXAMINATION:   VITAL SIGNS:  Blood pressure 136/72, heart rate 72 and regular.  Weight 145 pounds, BMI 23.5.   GENERAL:  The patient appears pleasant, comfortable.   NECK:  JVP appears normal, but positive hepatojugular reflux.   CARDIOVASCULAR SYSTEM:  S1, S2 normal, no murmur, rub or gallop.   RESPIRATORY SYSTEM:  A few fine crackles heard at the bases.   ABDOMEN:  Soft, nontender.   EXTREMITIES:  There is about 1 to 2+ pitting pedal edema bilaterally.   NEUROLOGICAL:  Alert, oriented x3.   PSYCHIATRIC:  Normal affect.   SKIN:  No obvious rash.   HEENT:  No pallor or icterus.      EKG as noted above shows a regular atrial rhythm, appears similar to previous EKGs in 2016 and 2014.   Zio Patch from 05/2016 was personally reviewed by me.   Echocardiogram last year showed normal LV function with mildly dilated ascending aorta measuring 3.8 cm.     Outpatient Encounter Prescriptions as of 6/2/2017   Medication Sig Dispense Refill     [DISCONTINUED] furosemide (LASIX) 20 MG tablet Take 1 tablet (20 mg) by mouth daily (Patient not taking: Reported on 6/21/2017) 90 tablet 3     dexamethasone (DECADRON) 4 MG tablet Take 20mg (5 tabs) by mouth every week the morning of velcade injection. Then take 1 tab (4mg) by mouth for two days after darzalex infusion. 28 tablet 0     lidocaine-prilocaine (EMLA) cream Apply topically as needed for moderate pain Apply dollop size amount to port site 30-60 min prior to accessing 30 g 1     LORazepam (ATIVAN) 0.5 MG tablet Take 1 tablet (0.5 mg) by mouth every 8 hours as needed for anxiety 20 tablet 3     [DISCONTINUED] oxyCODONE (ROXICODONE) 15 MG IR tablet Take 1 tablet (15 mg) by mouth every 8 hours as needed for pain maximum 4 tablet(s) per day (Patient not taking: Reported on 6/21/2017) 90 tablet 0     [DISCONTINUED] warfarin (COUMADIN) 4 MG tablet Take 6 mg on Mon, Wed, Fri; 4  mg all other days or as directed by INR clinic. 120 tablet 0     Acetaminophen (TYLENOL PO) Take 500 mg by mouth every 6 hours as needed for mild pain or fever       acyclovir (ZOVIRAX) 400 MG tablet Take 1 tablet (400 mg) by mouth 2 times daily Viral Prophylaxis. 60 tablet 11     Zoledronic Acid (ZOMETA IV) Inject into the vein every 30 days Every 3 month dosing       metoprolol (TOPROL-XL) 50 MG 24 hr tablet Take 1 tablet (50 mg) by mouth 2 times daily 180 tablet 3     Desloratadine (CLARINEX PO) Take by mouth daily Taking claritin       Prochlorperazine Maleate (COMPAZINE PO) Take 10 mg by mouth daily as needed        [DISCONTINUED] OXYCODONE HCL PO Take by mouth as needed       [DISCONTINUED] LORazepam (ATIVAN) 0.5 MG tablet 1 hour prior to MRI take 1 tablet (0.5 mg) and may repeat x's 1. 10 tablet 0     polyethylene glycol (MIRALAX/GLYCOLAX) powder Take 1 capful by mouth daily as needed        UNABLE TO FIND MEDICATION NAME: Fresh Coat eye drops       timolol (TIMOPTIC) 0.25 % ophthalmic solution 1 drop every morning       carboxymethylcellulose (REFRESH PLUS) 0.5 % SOLN 1 drop 4 times daily       Cholecalciferol (VITAMIN D3 PO) Take 1,000 Units by mouth daily       Calcium Citrate-Vitamin D (CALCIUM CITRATE + PO) Take 2,000 mg by mouth daily 2 tabs       erythromycin (ROMYCIN) ophthalmic ointment Place 1 Application into both eyes At Bedtime        cycloSPORINE (RESTASIS) 0.05 % ophthalmic emulsion Place 1 drop into both eyes every 12 hours        Docusate Sodium (GENTLE STOOL SOFTENER PO) Take 100 mg by mouth daily        Multiple Vitamin (DAILY MULTIVITAMIN PO) Take 1 tablet by mouth daily.       ASPIRIN NOT PRESCRIBED (INTENTIONAL) Antiplatelet medication not prescribed intentionally due to Current anticoagulant therapy (warfarin/enoxaparin) 0 each 0     No facility-administered encounter medications on file as of 6/2/2017.       IMPRESSION AND PLAN:  A very delightful 84-year-old female with paroxysmal  atrial fibrillation, CHADS2-VASc score of 4, multiple myeloma on chemotherapy, hypertension.  It appears patient is not symptomatic during those episodes of atrial fibrillation other than the episode which happened 3 days ago while she was getting chemotherapy and felt a little jittery sensation when was told about rapid heart rate.  I had a long discussion with the patient regarding the pathophysiology of atrial fibrillation, rate versus rhythm control strategy and stroke prophylaxis.  Given CHADS2-VASc score of 4, oral anticoagulation is appropriate and she is on Coumadin, which she recently started after a brief interruption for PortCath placement.  She is followed by Coumadin Clinic.  Regarding rate versus rhythm control strategy, I had a long discussion and we discussed about available data that shows no mortality benefit of one strategy over the other.  We discussed the option of rhythm control strategy and possible adverse effects of antiarrhythmic drugs.  At this time, patient tells me that despite having paroxysmal episodes of atrial fibrillation, she is not symptomatic from them and is not getting her quality of life affected, and at this time she would prefer not to start antiarrhythmic drug.  She agrees to increase her metoprolol from 50 mg b.i.d. to 100/50 to see if this can help better control her heart rate when she does go into atrial fibrillation.  She has evidence of some volume overload which looks like has been going on for the last several months in the setting of chronic steroid therapy and getting IV fluids with regular chemotherapy.  I recommend getting an echocardiogram to make sure there is no structural heart disease.  We will start Lasix 20 mg daily, intentionally low dose is selected as patient is wary of higher dose diuretics.  I recommend the patient to take Lasix in the morning.  I recommend checking a BMP in about 1 week time.  It looks like she has regular followup with her  oncologist, and at that time depending upon her weights and pedal edema, further escalation of diuretic therapy can be done.  I am setting up a followup in about 4-6 weeks.  In future if she has recurrent episodes of paroxysmal atrial fibrillation with rapid ventricular rate causing symptoms, a rhythm control strategy can then be considered.   1.  Paroxysmal atrial fibrillation.  CHADS2-VASc score of 4.  Presently not in atrial fibrillation.  Rhythm appears to be regular atrial rhythm.  Does not appear patient is symptomatic during episodes of atrial fibrillation, as noted above, on Coumadin for stroke prophylaxis.  For now we will continue rate control strategy.   2.  Multiple myeloma, on chemotherapy, being followed by Dr. Roberts.   3.  Hypertension, well controlled.   4.  Weight gain over the last 4-5 months with evidence of some volume overload in the setting of chronic steroid and getting IV fluids with regular chemotherapy.      RECOMMENDATIONS:   1.  Lasix 20 mg daily.   2.  Consider increasing Toprol-XL to 100/50.  If patient feels adverse effects from this increase, for example increased fatigue, she can go down to 50 mg b.i.d.   3.  BMP in 1 week time.   4.  Echocardiogram.   5.  My clinic is going to update her with the results of the echocardiogram.   6.  Follow up in about 4 weeks' time.     Again, thank you for allowing me to participate in the care of your patient.      Sincerely,    Ramos Rivera MD     [facility]    cc:   Addy Frias MD  [address] [facility] [address]  [facility] [address]

## 2017-06-03 NOTE — PROGRESS NOTES
REASON FOR CARDIOLOGY VISIT:  Atrial fibrillation.      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  The patient is establishing cardiology care with us today.  Recently, about 3 days ago the patient was getting infusion for new chemotherapy, and she was noted to have heart rate in 120s to 130s.  The patient tells me that at that time when she was told that her heart rate was fast, she did feel a little bit of jittery sensation in the chest.  Remarkably, about a year ago patient had Zio Patch monitoring for about 2 weeks, and paroxysmal episodes of atrial fibrillation were detected along with atrial tachycardia, 130 such episodes were detected.  It looks like patient is not significantly symptomatic in her day-to-day activities in terms of any palpitation symptoms.  Presently, her main issue is that she is feeling a sensation of fogginess, not able to concentrate that has been there for now a few weeks.  She has also noticed weight gain over the course of last several months.  It looks like she weighed 130 pounds about 4 months ago.  Now she weighs 145 pounds, and she has also noticed swelling over her lower extremity.  To be noted, she also gets weekly dexamethasone 20 mg.  EKG today shows that she is not in atrial fibrillation.  This is a regular atrial rhythm.  She denies any shortness of breath, any orthopnea, any PND or chest discomfort.  She is presently on 50 mg b.i.d. of Toprol-XL.  She is also on Coumadin, which was recently stopped due to her Port-A-Cath placement, but she has resumed it a few days ago.      PHYSICAL EXAMINATION:   VITAL SIGNS:  Blood pressure 136/72, heart rate 72 and regular.  Weight 145 pounds, BMI 23.5.    GENERAL:  The patient appears pleasant, comfortable.   NECK:  JVP appears normal, but positive hepatojugular reflux.   CARDIOVASCULAR SYSTEM:  S1, S2 normal, no murmur, rub or gallop.   RESPIRATORY SYSTEM:  A few fine crackles heard at the bases.   ABDOMEN:  Soft, nontender.   EXTREMITIES:  There is about 1 to 2+ pitting pedal edema bilaterally.   NEUROLOGICAL:  Alert, oriented x3.   PSYCHIATRIC:  Normal affect.   SKIN:  No obvious rash.   HEENT:  No pallor or icterus.      EKG as noted above shows a regular atrial rhythm, appears similar to previous EKGs in 2016 and 2014.   Zio Patch from 05/2016 was personally reviewed by me.   Echocardiogram last year showed normal LV function with mildly dilated ascending aorta measuring 3.8 cm.      IMPRESSION AND PLAN:  A very delightful 84-year-old female with paroxysmal atrial fibrillation, CHADS2-VASc score of 4, multiple myeloma on chemotherapy, hypertension.  It appears patient is not symptomatic during those episodes of atrial fibrillation other than the episode which happened 3 days ago while she was getting chemotherapy and felt a little jittery sensation when was told about rapid heart rate.  I had a long discussion with the patient regarding the pathophysiology of atrial fibrillation, rate versus rhythm control strategy and stroke prophylaxis.  Given CHADS2-VASc score of 4, oral anticoagulation is appropriate and she is on Coumadin, which she recently started after a brief interruption for PortCath placement.  She is followed by Coumadin Clinic.  Regarding rate versus rhythm control strategy, I had a long discussion and we discussed about available data that shows no mortality benefit of one strategy over the other.  We discussed the option of rhythm control strategy and possible adverse effects of antiarrhythmic drugs.  At this time, patient tells me that despite having paroxysmal episodes of atrial fibrillation, she is not symptomatic from them and is not getting  her quality of life affected, and at this time she would prefer not to start antiarrhythmic drug.  She agrees to increase her metoprolol from 50 mg b.i.d. to 100/50 to see if this can help better control her heart rate when she does go into atrial fibrillation.  She has evidence of some volume overload which looks like has been going on for the last several months in the setting of chronic steroid therapy and getting IV fluids with regular chemotherapy.  I recommend getting an echocardiogram to make sure there is no structural heart disease.  We will start Lasix 20 mg daily, intentionally low dose is selected as patient is wary of higher dose diuretics.  I recommend the patient to take Lasix in the morning.  I recommend checking a BMP in about 1 week time.  It looks like she has regular followup with her oncologist, and at that time depending upon her weights and pedal edema, further escalation of diuretic therapy can be done.  I am setting up a followup in about 4-6 weeks.  In future if she has recurrent episodes of paroxysmal atrial fibrillation with rapid ventricular rate causing symptoms, a rhythm control strategy can then be considered.   1.  Paroxysmal atrial fibrillation.  CHADS2-VASc score of 4.  Presently not in atrial fibrillation.  Rhythm appears to be regular atrial rhythm.  Does not appear patient is symptomatic during episodes of atrial fibrillation, as noted above, on Coumadin for stroke prophylaxis.  For now we will continue rate control strategy.   2.  Multiple myeloma, on chemotherapy, being followed by Dr. Roberts.   3.  Hypertension, well controlled.   4.  Weight gain over the last 4-5 months with evidence of some volume overload in the setting of chronic steroid and getting IV fluids with regular chemotherapy.      RECOMMENDATIONS:   1.  Lasix 20 mg daily.   2.  Consider increasing Toprol-XL to 100/50.  If patient feels adverse effects from this increase, for example increased fatigue, she can go  down to 50 mg b.i.d.   3.  BMP in 1 week time.   4.  Echocardiogram.   5.  My clinic is going to update her with the results of the echocardiogram.   6.  Follow up in about 4 weeks' time.      cc:   Shayne Roberts MD    [facility] [address], [address]    [place]  [address]         MELISSA MOSNIVAIS MD             D: 2017 12:22   T: 2017 23:11   MT: FIFI      Name:     ALBERTO PARSON   MRN:      [id number]        Account:      [id number]   :      1932           Service Date: 2017      Document: [id number]

## 2017-06-05 ENCOUNTER — TELEPHONE (OUTPATIENT)
Dept: FAMILY MEDICINE | Facility: CLINIC | Age: 82
End: 2017-06-05

## 2017-06-05 ENCOUNTER — ANTICOAGULATION THERAPY VISIT (OUTPATIENT)
Dept: NURSING | Facility: CLINIC | Age: 82
End: 2017-06-05
Payer: COMMERCIAL

## 2017-06-05 DIAGNOSIS — Z79.01 LONG-TERM (CURRENT) USE OF ANTICOAGULANTS: Primary | ICD-10-CM

## 2017-06-05 DIAGNOSIS — I48.0 PAROXYSMAL ATRIAL FIBRILLATION (H): ICD-10-CM

## 2017-06-05 DIAGNOSIS — Z79.01 LONG-TERM (CURRENT) USE OF ANTICOAGULANTS: ICD-10-CM

## 2017-06-05 LAB — INR POINT OF CARE: 1.4 (ref 0.86–1.14)

## 2017-06-05 PROCEDURE — 85610 PROTHROMBIN TIME: CPT | Mod: QW

## 2017-06-05 PROCEDURE — 36416 COLLJ CAPILLARY BLOOD SPEC: CPT

## 2017-06-05 NOTE — PROGRESS NOTES
ANTICOAGULATION FOLLOW-UP CLINIC VISIT    Patient Name:  Amira Arreola  Date:  6/5/2017  Contact Type:  Face to Face    SUBJECTIVE:     Patient Findings     Positives Initiation of therapy (restarting warfarin. Patient also had new chemo. See 6/2/17 anticoaogulation encounter)           OBJECTIVE    INR Protime   Date Value Ref Range Status   06/05/2017 1.4 (A) 0.86 - 1.14 Final       ASSESSMENT / PLAN  INR assessment SUB    Recheck INR In: 3 DAYS    INR Location Clinic      Anticoagulation Summary as of 6/5/2017     INR goal 2.0-3.0   Today's INR 1.4!   Maintenance plan 6 mg (4 mg x 1.5) on Mon, Wed, Fri; 4 mg (4 mg x 1) all other days   Full instructions 6/6: 6 mg; Otherwise 6 mg on Mon, Wed, Fri; 4 mg all other days   Weekly total 34 mg   Plan last modified Dayana Barrera RN (3/10/2017)   Next INR check 6/8/2017   Target end date Indefinite    Indications   Long-term (current) use of anticoagulants [Z79.01] [Z79.01]  Atrial fibrillation (H) [I48.91] (Resolved) [I48.91]         Anticoagulation Episode Summary     INR check location     Preferred lab     Send INR reminders to  ACC    Comments PATIENT TAKES WARFARIN IN THE MORNING      Anticoagulation Care Providers     Provider Role Specialty Phone number    Addy Frias MD [facility] [phone]            See the Encounter Report to view Anticoagulation Flowsheet and Dosing Calendar (Go to Encounters tab in chart review, and find the Anticoagulation Therapy Visit)    Dosage adjustment made based on physician directed care plan.  Patient to take 6 mg of warfarin  MTW and recheck Thursday 6/8. Will = 36 mg for 7 day total.    Eufemia Boateng, SHELLIE

## 2017-06-05 NOTE — MR AVS SNAPSHOT
Amira Arreola   6/5/2017 11:00 AM   Anticoagulation Therapy Visit    Description:  84 year old female   Provider:   ANTICOAGULATION CLINIC   Department:  Ec Nurse           INR as of 6/5/2017     Today's INR 1.4!      Anticoagulation Summary as of 6/5/2017     INR goal 2.0-3.0   Today's INR 1.4!   Full instructions 6/6: 6 mg; Otherwise 6 mg on Mon, Wed, Fri; 4 mg all other days   Next INR check 6/8/2017    Indications   Long-term (current) use of anticoagulants [Z79.01] [Z79.01]  Atrial fibrillation (H) [I48.91] (Resolved) [I48.91]         Your next Anticoagulation Clinic appointment(s)     Jun 08, 2017  9:30 AM CDT   Anticoagulation Visit with  ANTICOAGULATION CLINIC   [facility] ([facility])    [address] [place] [id number]   [phone]              Contact Numbers     Clinic Number:         June 2017 Details    Sun Mon Tue Wed Thu Fri Sat         1               2               3                 4               5      6 mg   See details      6      6 mg         7      6 mg         8            9               10                 11               12               13               14               15               16               17                 18               19               20               21               22               23               24                 25               26               27               28               29               30                 Date Details   06/05 This INR check       Date of next INR:  6/8/2017         How to take your warfarin dose     To take:  4 mg Take 1 of the 4 mg tablets.    To take:  6 mg Take 1.5 of the 4 mg tablets.

## 2017-06-05 NOTE — TELEPHONE ENCOUNTER
Patient is due for annual anticoagulation referral.  Last appointment with PCP: 12/22/16    ASSESSMENT / PLAN  INR assessment SUB     Recheck INR In: 3 DAYS     INR Location Clinic              Anticoagulation Summary as of 6/5/2017            INR goal 2.0-3.0   Today's INR 1.4!   Maintenance plan 6 mg (4 mg x 1.5) on Mon, Wed, Fri; 4 mg (4 mg x 1) all other days   Full instructions 6/6: 6 mg; Otherwise 6 mg on Mon, Wed, Fri; 4 mg all other days   Weekly total 34 mg   Plan last modified Dayana Barrera, RN (3/10/2017)   Next INR check 6/8/2017   Target end date Indefinite          INR Referral order pended.   Eufemia Boateng RN

## 2017-06-07 ENCOUNTER — HOSPITAL ENCOUNTER (OUTPATIENT)
Facility: CLINIC | Age: 82
Setting detail: SPECIMEN
Discharge: HOME | End: 2017-06-07
Attending: INTERNAL MEDICINE | Admitting: INTERNAL MEDICINE
Payer: MEDICARE

## 2017-06-07 ENCOUNTER — INFUSION THERAPY VISIT (OUTPATIENT)
Dept: INFUSION THERAPY | Facility: CLINIC | Age: 82
End: 2017-06-07
Attending: INTERNAL MEDICINE
Payer: MEDICARE

## 2017-06-07 VITALS
DIASTOLIC BLOOD PRESSURE: 73 MMHG | OXYGEN SATURATION: 96 % | HEART RATE: 68 BPM | RESPIRATION RATE: 16 BRPM | WEIGHT: 137.79 LBS | BODY MASS INDEX: 22.14 KG/M2 | SYSTOLIC BLOOD PRESSURE: 135 MMHG | TEMPERATURE: 98.6 F | HEIGHT: 66 IN

## 2017-06-07 DIAGNOSIS — C90.00 MULTIPLE MYELOMA NOT HAVING ACHIEVED REMISSION (H): Primary | ICD-10-CM

## 2017-06-07 LAB
BASOPHILS # BLD AUTO: 0 10E9/L (ref 0–0.2)
BASOPHILS NFR BLD AUTO: 0 %
DIFFERENTIAL METHOD BLD: ABNORMAL
EOSINOPHIL # BLD AUTO: 0 10E9/L (ref 0–0.7)
EOSINOPHIL NFR BLD AUTO: 0.2 %
ERYTHROCYTE [DISTWIDTH] IN BLOOD BY AUTOMATED COUNT: 14.8 % (ref 10–15)
HCT VFR BLD AUTO: 34.1 % (ref 35–47)
HGB BLD-MCNC: 11.5 G/DL (ref 11.7–15.7)
IMM GRANULOCYTES # BLD: 0 10E9/L (ref 0–0.4)
IMM GRANULOCYTES NFR BLD: 0.2 %
LYMPHOCYTES # BLD AUTO: 0.3 10E9/L (ref 0.8–5.3)
LYMPHOCYTES NFR BLD AUTO: 6.5 %
MCH RBC QN AUTO: 34 PG (ref 26.5–33)
MCHC RBC AUTO-ENTMCNC: 33.7 G/DL (ref 31.5–36.5)
MCV RBC AUTO: 101 FL (ref 78–100)
MONOCYTES # BLD AUTO: 0.1 10E9/L (ref 0–1.3)
MONOCYTES NFR BLD AUTO: 3.1 %
NEUTROPHILS # BLD AUTO: 4 10E9/L (ref 1.6–8.3)
NEUTROPHILS NFR BLD AUTO: 90 %
NRBC # BLD AUTO: 0 10*3/UL
NRBC BLD AUTO-RTO: 0 /100
PLATELET # BLD AUTO: 118 10E9/L (ref 150–450)
RBC # BLD AUTO: 3.38 10E12/L (ref 3.8–5.2)
WBC # BLD AUTO: 4.5 10E9/L (ref 4–11)

## 2017-06-07 PROCEDURE — 96401 CHEMO ANTI-NEOPL SQ/IM: CPT

## 2017-06-07 PROCEDURE — 85025 COMPLETE CBC W/AUTO DIFF WBC: CPT | Performed by: INTERNAL MEDICINE

## 2017-06-07 PROCEDURE — 96413 CHEMO IV INFUSION 1 HR: CPT

## 2017-06-07 PROCEDURE — 25000132 ZZH RX MED GY IP 250 OP 250 PS 637: Mod: GY | Performed by: INTERNAL MEDICINE

## 2017-06-07 PROCEDURE — 96367 TX/PROPH/DG ADDL SEQ IV INF: CPT

## 2017-06-07 PROCEDURE — 96375 TX/PRO/DX INJ NEW DRUG ADDON: CPT

## 2017-06-07 PROCEDURE — A9270 NON-COVERED ITEM OR SERVICE: HCPCS | Mod: GY | Performed by: INTERNAL MEDICINE

## 2017-06-07 PROCEDURE — 96415 CHEMO IV INFUSION ADDL HR: CPT

## 2017-06-07 PROCEDURE — 25000128 H RX IP 250 OP 636: Performed by: INTERNAL MEDICINE

## 2017-06-07 RX ORDER — ACETAMINOPHEN 325 MG/1
650 TABLET ORAL ONCE
Status: COMPLETED | OUTPATIENT
Start: 2017-06-07 | End: 2017-06-07

## 2017-06-07 RX ORDER — HEPARIN SODIUM (PORCINE) LOCK FLUSH IV SOLN 100 UNIT/ML 100 UNIT/ML
5 SOLUTION INTRAVENOUS EVERY 8 HOURS
Status: DISCONTINUED | OUTPATIENT
Start: 2017-06-07 | End: 2017-06-07 | Stop reason: HOSPADM

## 2017-06-07 RX ADMIN — DEXAMETHASONE SODIUM PHOSPHATE 20 MG: 10 INJECTION, SOLUTION INTRAMUSCULAR; INTRAVENOUS at 09:23

## 2017-06-07 RX ADMIN — DARATUMUMAB 1000 MG: 100 INJECTION, SOLUTION, CONCENTRATE INTRAVENOUS at 09:57

## 2017-06-07 RX ADMIN — DIPHENHYDRAMINE HYDROCHLORIDE 50 MG: 50 INJECTION, SOLUTION INTRAMUSCULAR; INTRAVENOUS at 09:43

## 2017-06-07 RX ADMIN — SODIUM CHLORIDE 250 ML: 9 INJECTION, SOLUTION INTRAVENOUS at 09:23

## 2017-06-07 RX ADMIN — SODIUM CHLORIDE, PRESERVATIVE FREE 5 ML: 5 INJECTION INTRAVENOUS at 16:12

## 2017-06-07 RX ADMIN — BORTEZOMIB 2.2 MG: 3.5 INJECTION, POWDER, LYOPHILIZED, FOR SOLUTION INTRAVENOUS; SUBCUTANEOUS at 10:01

## 2017-06-07 RX ADMIN — ACETAMINOPHEN 650 MG: 325 TABLET ORAL at 09:00

## 2017-06-07 ASSESSMENT — PAIN SCALES - GENERAL: PAINLEVEL: NO PAIN (0)

## 2017-06-07 NOTE — MR AVS SNAPSHOT
After Visit Summary   6/7/2017    Amira Arreola    MRN: [id number]           Patient Information     Date Of Birth          7/17/1932        Visit Information        Provider Department      6/7/2017 8:00 AM  INFUSION CHAIR 11 [facility]        Today's Diagnoses     Multiple myeloma not having achieved remission (H)    -  1      Care Instructions    EMLA CREAM: PUT ON A NICKEL SIZE AMOUNT OVER THE PORT ACCESS AREA AND COVER WITH SARAN WRAP OR TEGADERM 30-45 MINUTES BEFORE YOUR NEXT EXAM.          Follow-ups after your visit        Your next 10 appointments already scheduled     Jun 08, 2017  9:30 AM CDT   Anticoagulation Visit with [facility] ANTICOAGULATION CLINIC   [facility] ([facility])    [address] [facility] [id number] [phone]            Jun 13, 2017 10:10 AM CDT   LAB with MA LAB   [facility] PHYSICIANS HEART AT [place] ([facility])    [address] [place] [address] [address]  [facility] [id number] [phone]           Patient must bring picture ID.  Patient should be prepared to give a urine specimen  Please do not eat 10-12 hours before your appointment if you are coming in fasting for labs on lipids, cholesterol, or glucose (sugar).  Pregnant women should follow their Care Team instructions. Water with medications is okay. Do not drink coffee or other fluids.   If you have concerns about taking  your medications, please ask at office or if scheduling via [employer], send a message by clicking on Secure Messaging, Message Your Care Team.            Jun 13, 2017 10:30 AM CDT   Ech Complete with [id number]   [facility] CV Echocardiography (Cardiovascular Imaging at [facility])    [address] [place] [address]  [address]  [facility] [id number] [phone]           1.  Please bring or wear a comfortable two-piece outfit. 2.  You may eat, drink and take your normal  medicines. 3.  For any questions that cannot be answered, please contact the ordering physician            Jun 14, 2017  8:30 AM CDT   Level 7 with  INFUSION CHAIR 12   [facility] ([facility])    [place] [id number]  [place] [address]   [phone]            Jun 21, 2017  9:00 AM CDT   Level 7 with  INFUSION CHAIR 15   [facility] ([facility])    [place] [id number]  [place] [address]   [phone]            Jun 21, 2017 10:00 AM CDT   Return Visit with Shayne Roberts MD   [facility] ([facility])    [facility]  [address]  [place] [address] [phone]            Jun 23, 2017  9:15 AM CDT   Return Visit with Ramos Rivera MD   [facility] PHYSICIANS [facility] AT [place] ([facility])    [address] [place] [address] [address]  [facility] [address] [phone]              Who to contact     If you have questions or need follow up information about today's clinic visit or your schedule please contact [facility] AND [facility] directly at [phone].  Normal or non-critical lab and imaging results will be communicated to you by [employer], letter or phone within 4 business days after the clinic has received the results. If you do not hear from us within 7 days, please contact the clinic through [employer] or phone. If you have a critical or abnormal lab result, we will notify you by phone as soon as possible.  Submit refill requests through datapine or call your pharmacy and they will forward the refill request to us. Please allow 3 business days for your refill to be completed.          Additional Information About Your Visit        [employer]Jump[employer]ter Information     datapine lets you send messages to your doctor, view your test results, renew your  "prescriptions, schedule appointments and more. To sign up, go to [email or web address] . Click on \"Log in\" on the left side of the screen, which will take you to the Welcome page. Then click on \"Sign up Now\" on the right side of the page.     You will be asked to enter the access code listed below, as well as some personal information. Please follow the directions to create your username and password.     Your access code is: [email or web address]  Expires: 2017 11:09 AM     Your access code will  in 90 days. If you need help or a new code, please call your [facility] clinic or [phone].        Care EveryWhere ID     This is your Care EveryWhere ID. This could be used by other organizations to access your [facility] medical records  [id number]        Your Vitals Were     Pulse Temperature Respirations Height Pulse Oximetry BMI (Body Mass Index)    68 98.6  F (37  C) (Oral) 16 1.676 m (5' 5.98\") 96% 22.25 kg/m2       Blood Pressure from Last 3 Encounters:   17 135/73   17 136/72   17 112/68    Weight from Last 3 Encounters:   17 62.5 kg (137 lb 12.6 oz)   17 66.1 kg (145 lb 12.8 oz)   17 63.4 kg (139 lb 12.8 oz)              We Performed the Following     CBC with platelets differential        Primary Care Provider Office Phone # Fax #    Addy Frias [phone]       [facility] [address]  [place] [address]        Thank you!     Thank you for choosing [facility] CANCER [facility] AND [facility] CENTER  for your care. Our goal is always to provide you with excellent care. Hearing back from our patients is one way we can continue to improve our services. Please take a few minutes to complete the written survey that you may receive in the mail after your visit with us. Thank you!             Your Updated Medication List - Protect others around you: Learn how to safely use, store and throw away your medicines at " www.disposemymeds.org.          This list is accurate as of: 6/7/17  4:15 PM.  Always use your most recent med list.                   Brand Name Dispense Instructions for use    acyclovir 400 MG tablet    ZOVIRAX    60 tablet    Take 1 tablet (400 mg) by mouth 2 times daily Viral Prophylaxis.       ASPIRIN NOT PRESCRIBED    INTENTIONAL    0 each    Antiplatelet medication not prescribed intentionally due to Current anticoagulant therapy (warfarin/enoxaparin)       * ATIVAN 0.5 MG tablet   Generic drug:  LORazepam     10 tablet    1 hour prior to MRI take 1 tablet (0.5 mg) and may repeat x's 1.       * LORazepam 0.5 MG tablet    ATIVAN    20 tablet    Take 1 tablet (0.5 mg) by mouth every 8 hours as needed for anxiety       CALCIUM CITRATE + PO      Take 2,000 mg by mouth 2 tabs       carboxymethylcellulose 0.5 % Soln ophthalmic solution    REFRESH PLUS     1 drop 4 times daily       CLARINEX PO      Taking claritin       COMPAZINE PO      Take 10 mg by mouth daily as needed       cycloSPORINE 0.05 % ophthalmic emulsion    RESTASIS     Place 1 drop into both eyes every 12 hours       DAILY MULTIVITAMIN PO      Take 1 tablet by mouth daily.       dexamethasone 4 MG tablet    DECADRON    28 tablet    Take 20mg (5 tabs) by mouth every week the morning of velcade injection. Then take 1 tab (4mg) by mouth for two days after darzalex infusion.       erythromycin ophthalmic ointment    ROMYCIN     Place 1 Application into both eyes At Bedtime       furosemide 20 MG tablet    LASIX    90 tablet    Take 1 tablet (20 mg) by mouth daily       GENTLE STOOL SOFTENER PO      Take 100 mg by mouth daily       lidocaine-prilocaine cream    EMLA    30 g    Apply topically as needed for moderate pain Apply dollop size amount to port site 30-60 min prior to accessing       metoprolol 50 MG 24 hr tablet    TOPROL-XL    180 tablet    Take 1 tablet (50 mg) by mouth 2 times daily       * OXYCODONE HCL PO      Take by mouth as needed        * oxyCODONE 15 MG IR tablet    ROXICODONE    90 tablet    Take 1 tablet (15 mg) by mouth every 8 hours as needed for pain maximum 4 tablet(s) per day       polyethylene glycol powder    MIRALAX/GLYCOLAX     Take 1 capful by mouth daily as needed       timolol 0.25 % ophthalmic solution    TIMOPTIC     1 drop every morning       TYLENOL PO      Take 500 mg by mouth every 6 hours as needed for mild pain or fever       UNABLE TO FIND      MEDICATION NAME: Fresh Coat eye drops       VITAMIN D3 PO      Take 1,000 Units by mouth daily       warfarin 4 MG tablet    COUMADIN    120 tablet    Take 6 mg on Mon, Wed, Fri; 4 mg all other days or as directed by INR clinic.       ZOMETA IV      Inject into the vein every 30 days Every 3 month dosing       * Notice:  This list has 4 medication(s) that are the same as other medications prescribed for you. Read the directions carefully, and ask your doctor or other care provider to review them with you.

## 2017-06-07 NOTE — PROGRESS NOTES
Infusion Nursing Note:  Amira Arreola presents today for C1D8 daratumumab/velcade.    Patient seen by provider today: No   present during visit today: Not Applicable.    Note: patient states that she went to the cardiologist and will have an ECHO done. He also increased her metoprolol and started her on lasix. Patient stated that she only took one dose of the lasix.    Intravenous Access:  Labs drawn without difficulty.  Implanted Port.    Treatment Conditions:  Lab Results   Component Value Date    HGB 11.5 06/07/2017     Lab Results   Component Value Date    WBC 4.5 06/07/2017      Lab Results   Component Value Date    ANEU 4.0 06/07/2017     Lab Results   Component Value Date     06/07/2017      Results reviewed, labs MET treatment parameters, ok to proceed with treatment.      Post Infusion Assessment:  Patient tolerated infusion without incident.  Blood return noted pre and post infusion.  Site patent and intact, free from redness, edema or discomfort.  No evidence of extravasations.  Access discontinued per protocol.    Discharge Plan:   Discharge instructions reviewed with: Patient.  Patient and/or family verbalized understanding of discharge instructions and all questions answered.  Copy of AVS reviewed with patient and/or family.  Patient will return 6/14/17 for next appointment.  Patient discharged in stable condition accompanied by: self.  Departure Mode: Ambulatory.    Ladonna Boo RN

## 2017-06-07 NOTE — PATIENT INSTRUCTIONS
EMLA CREAM: PUT ON A NICKEL SIZE AMOUNT OVER THE PORT ACCESS AREA AND COVER WITH SARAN WRAP OR TEGADERM 30-45 MINUTES BEFORE YOUR NEXT EXAM.

## 2017-06-08 ENCOUNTER — ANTICOAGULATION THERAPY VISIT (OUTPATIENT)
Dept: NURSING | Facility: CLINIC | Age: 82
End: 2017-06-08
Payer: COMMERCIAL

## 2017-06-08 DIAGNOSIS — Z79.01 LONG-TERM (CURRENT) USE OF ANTICOAGULANTS: ICD-10-CM

## 2017-06-08 LAB — INR POINT OF CARE: 2.8 (ref 0.86–1.14)

## 2017-06-08 PROCEDURE — 36416 COLLJ CAPILLARY BLOOD SPEC: CPT

## 2017-06-08 PROCEDURE — 85610 PROTHROMBIN TIME: CPT | Mod: QW

## 2017-06-08 NOTE — MR AVS SNAPSHOT
Amira Arreola   6/8/2017 9:30 AM   Anticoagulation Therapy Visit    Description:  84 year old female   Provider:  [facility] ANTICOAGULATION CLINIC   Department:  Ec Nurse           INR as of 6/8/2017     Today's INR 2.8      Anticoagulation Summary as of 6/8/2017     INR goal 2.0-3.0   Today's INR 2.8   Full instructions 6/8: 6 mg; 6/9: 4 mg; 6/10: 6 mg; Otherwise 6 mg on Mon, Wed, Fri; 4 mg all other days   Next INR check 6/13/2017    Indications   Long-term (current) use of anticoagulants [Z79.01] [Z79.01]  Atrial fibrillation (H) [I48.91] (Resolved) [I48.91]         Your next Anticoagulation Clinic appointment(s)     Jun 13, 2017 12:15 PM CDT   Anticoagulation Visit with  ANTICOAGULATION CLINIC   [facility] ([facility])    [address] [place] [id number] [phone]              Contact Numbers     Clinic Number:         June 2017 Details    Sun Mon Tue Wed Thu Fri Sat         1               2               3                 4               5               6               7               8      6 mg   See details      9      4 mg         10      6 mg           11      4 mg         12      6 mg         13            14               15               16               17                 18               19               20               21               22               23               24                 25               26               27               28               29               30                 Date Details   06/08 This INR check       Date of next INR:  6/13/2017         How to take your warfarin dose     To take:  4 mg Take 1 of the 4 mg tablets.    To take:  6 mg Take 1.5 of the 4 mg tablets.

## 2017-06-08 NOTE — PROGRESS NOTES
ANTICOAGULATION FOLLOW-UP CLINIC VISIT    Patient Name:  Amira Arreola  Date:  6/8/2017  Contact Type:  Face to Face    SUBJECTIVE:     Patient Findings     Positives Change in medications    Comments On chemo, had treatment yesterday           OBJECTIVE    INR Protime   Date Value Ref Range Status   06/08/2017 2.8 (A) 0.86 - 1.14 Final       ASSESSMENT / PLAN  INR assessment THER    Recheck INR In: 5 DAYS    INR Location Clinic      Anticoagulation Summary as of 6/8/2017     INR goal 2.0-3.0   Today's INR 2.8   Maintenance plan 6 mg (4 mg x 1.5) on Mon, Wed, Fri; 4 mg (4 mg x 1) all other days   Full instructions 6/8: 6 mg; 6/9: 4 mg; 6/10: 6 mg; Otherwise 6 mg on Mon, Wed, Fri; 4 mg all other days   Weekly total 34 mg   Plan last modified Dayana Barrera RN (3/10/2017)   Next INR check 6/13/2017   Target end date Indefinite    Indications   Long-term (current) use of anticoagulants [Z79.01] [Z79.01]  Atrial fibrillation (H) [I48.91] (Resolved) [I48.91]         Anticoagulation Episode Summary     INR check location     Preferred lab     Send INR reminders to [facility]    Comments PATIENT TAKES WARFARIN IN THE MORNING      Anticoagulation Care Providers     Provider Role Specialty Phone number    DilipjaronAddy llamas MD [facility] Internal Medicine [phone]            See the Encounter Report to view Anticoagulation Flowsheet and Dosing Calendar (Go to Encounters tab in chart review, and find the Anticoagulation Therapy Visit)    Take 4 mg FSu, 6 mg Christian and recheck Tuesday, 6/13.   Will change from taking Warfarin in the morning to evening 6/9/17.  Dosage adjustment made based on physician directed care plan.    Alissa Noble, SHELLIE

## 2017-06-13 ENCOUNTER — HOSPITAL ENCOUNTER (OUTPATIENT)
Dept: CARDIOLOGY | Facility: CLINIC | Age: 82
Discharge: HOME | End: 2017-06-13
Attending: INTERNAL MEDICINE | Admitting: INTERNAL MEDICINE
Payer: MEDICARE

## 2017-06-13 ENCOUNTER — ANTICOAGULATION THERAPY VISIT (OUTPATIENT)
Dept: NURSING | Facility: CLINIC | Age: 82
End: 2017-06-13
Payer: COMMERCIAL

## 2017-06-13 DIAGNOSIS — I48.0 PAROXYSMAL ATRIAL FIBRILLATION (H): ICD-10-CM

## 2017-06-13 DIAGNOSIS — Z79.01 LONG-TERM (CURRENT) USE OF ANTICOAGULANTS: ICD-10-CM

## 2017-06-13 DIAGNOSIS — I77.810 ASCENDING AORTA DILATATION (H): ICD-10-CM

## 2017-06-13 LAB
ANION GAP SERPL CALCULATED.3IONS-SCNC: 8 MMOL/L (ref 6–17)
BUN SERPL-MCNC: 12 MG/DL (ref 7–30)
CALCIUM SERPL-MCNC: 9.7 MG/DL (ref 8.5–10.5)
CHLORIDE SERPL-SCNC: 104 MMOL/L (ref 98–107)
CO2 SERPL-SCNC: 29 MMOL/L (ref 23–29)
CREAT SERPL-MCNC: 0.7 MG/DL (ref 0.7–1.3)
GFR SERPL CREATININE-BSD FRML MDRD: 80 ML/MIN/1.7M2
GLUCOSE SERPL-MCNC: 117 MG/DL (ref 70–105)
INR POINT OF CARE: 2 (ref 0.86–1.14)
POTASSIUM SERPL-SCNC: 4 MMOL/L (ref 3.5–5.1)
SODIUM SERPL-SCNC: 137 MMOL/L (ref 136–145)

## 2017-06-13 PROCEDURE — 93306 TTE W/DOPPLER COMPLETE: CPT

## 2017-06-13 PROCEDURE — 85610 PROTHROMBIN TIME: CPT | Mod: QW

## 2017-06-13 PROCEDURE — 36416 COLLJ CAPILLARY BLOOD SPEC: CPT

## 2017-06-13 PROCEDURE — 93306 TTE W/DOPPLER COMPLETE: CPT | Mod: 26 | Performed by: INTERNAL MEDICINE

## 2017-06-13 PROCEDURE — 80048 BASIC METABOLIC PNL TOTAL CA: CPT | Performed by: INTERNAL MEDICINE

## 2017-06-13 RX ORDER — HEPARIN SODIUM (PORCINE) LOCK FLUSH IV SOLN 100 UNIT/ML 100 UNIT/ML
5 SOLUTION INTRAVENOUS EVERY 8 HOURS
Status: CANCELLED
Start: 2017-06-14

## 2017-06-13 RX ORDER — HEPARIN SODIUM (PORCINE) LOCK FLUSH IV SOLN 100 UNIT/ML 100 UNIT/ML
5 SOLUTION INTRAVENOUS EVERY 8 HOURS
Status: CANCELLED
Start: 2017-06-21

## 2017-06-13 NOTE — MR AVS SNAPSHOT
Amira Arreola   6/13/2017 12:15 PM   Anticoagulation Therapy Visit    Description:  84 year old female   Provider:  [facility] ANTICOAGULATION [facility]IN[facility]   Department:  Ec Nurse           INR as of 6/13/2017     Today's INR 2.0      Anticoagulation Summary as of 6/13/2017     INR goal 2.0-3.0   Today's INR 2.0   Full instructions 6/15: 6 mg; 6/18: 6 mg; Otherwise 6 mg on Mon, Wed, Fri; 4 mg all other days   Next INR check 6/22/2017    Indications   Long-term (current) use of anticoagulants [Z79.01] [Z79.01]  Atrial fibrillation (H) [I48.91] (Resolved) [I48.91]         Your next Anticoagulation Clinic appointment(s)     Jun 13, 2017 12:15 PM CDT   Anticoagulation Visit with EC ANTICOAGULATION CLINIC   [facility] ([facility])    [address] [place] [address]   [phone]            Jun 22, 2017 10:00 AM CDT   Anticoagulation Visit with EC ANTICOAGULATION CLINIC   [facility] ([address] [address]   [phone]              Contact Numbers     Clinic Number:         June 2017 Details    Sun Mon Tue Wed Thu Fri Sat         1               2               3                 4               5               6               7               8               9               10                 11               12               13      4 mg   See details      14      6 mg         15      6 mg         16      6 mg         17      4 mg           18      6 mg         19      6 mg         20      4 mg         21      6 mg         22            23               24                 25               26               27               28               29               30                 Date Details   06/13 This INR check       Date of next INR:  6/22/2017         How to take your warfarin dose     To take:  4 mg Take 1 of the 4 mg tablets.    To take:  6 mg Take 1.5 of the 4 mg tablets.

## 2017-06-13 NOTE — PROGRESS NOTES
2ANTICOAGULATION FOLLOW-UP CLINIC VISIT    Patient Name:  Amira Arreola  Date:  6/13/2017  Contact Type:  Face to Face    SUBJECTIVE:     Patient Findings     Positives No Problem Findings           OBJECTIVE    INR Protime   Date Value Ref Range Status   06/13/2017 2.0 (A) 0.86 - 1.14 Final       ASSESSMENT / PLAN  INR assessment THER    Recheck INR In: 1 WEEK    INR Location Clinic      Anticoagulation Summary as of 6/13/2017     INR goal 2.0-3.0   Today's INR 2.0   Maintenance plan 6 mg (4 mg x 1.5) on Mon, Wed, Fri; 4 mg (4 mg x 1) all other days   Full instructions 6/15: 6 mg; 6/18: 6 mg; Otherwise 6 mg on Mon, Wed, Fri; 4 mg all other days   Weekly total 34 mg   Plan last modified Dayana Barrera RN (3/10/2017)   Next INR check 6/22/2017   Target end date Indefinite    Indications   Long-term (current) use of anticoagulants [Z79.01] [Z79.01]  Atrial fibrillation (H) [I48.91] (Resolved) [I48.91]         Anticoagulation Episode Summary     INR check location     Preferred lab     Send INR reminders to [facility]    Comments       Anticoagulation Care Providers     Provider Role Specialty Phone number    Addy Frias MD [facility] Internal Medicine [phone]            See the Encounter Report to view Anticoagulation Flowsheet and Dosing Calendar (Go to Encounters tab in chart review, and find the Anticoagulation Therapy Visit)    Take 4 mg TSa, 6 mg all other days, recheck one week.    Dosage adjustment made based on physician directed care plan.    Alissa Noble RN

## 2017-06-14 ENCOUNTER — HOSPITAL ENCOUNTER (OUTPATIENT)
Facility: CLINIC | Age: 82
Setting detail: SPECIMEN
Discharge: HOME | End: 2017-06-14
Attending: INTERNAL MEDICINE | Admitting: INTERNAL MEDICINE
Payer: MEDICARE

## 2017-06-14 ENCOUNTER — INFUSION THERAPY VISIT (OUTPATIENT)
Dept: INFUSION THERAPY | Facility: CLINIC | Age: 82
End: 2017-06-14
Attending: INTERNAL MEDICINE
Payer: MEDICARE

## 2017-06-14 VITALS
HEART RATE: 77 BPM | TEMPERATURE: 98.2 F | HEIGHT: 66 IN | RESPIRATION RATE: 16 BRPM | SYSTOLIC BLOOD PRESSURE: 124 MMHG | DIASTOLIC BLOOD PRESSURE: 74 MMHG | WEIGHT: 138.8 LBS | BODY MASS INDEX: 22.31 KG/M2

## 2017-06-14 DIAGNOSIS — C90.00 MULTIPLE MYELOMA NOT HAVING ACHIEVED REMISSION (H): Primary | ICD-10-CM

## 2017-06-14 LAB
BASOPHILS # BLD AUTO: 0 10E9/L (ref 0–0.2)
BASOPHILS NFR BLD AUTO: 0 %
DIFFERENTIAL METHOD BLD: ABNORMAL
EOSINOPHIL # BLD AUTO: 0 10E9/L (ref 0–0.7)
EOSINOPHIL NFR BLD AUTO: 0.2 %
ERYTHROCYTE [DISTWIDTH] IN BLOOD BY AUTOMATED COUNT: 14.7 % (ref 10–15)
HCT VFR BLD AUTO: 35.4 % (ref 35–47)
HGB BLD-MCNC: 12.1 G/DL (ref 11.7–15.7)
IMM GRANULOCYTES # BLD: 0 10E9/L (ref 0–0.4)
IMM GRANULOCYTES NFR BLD: 0.2 %
LYMPHOCYTES # BLD AUTO: 0.2 10E9/L (ref 0.8–5.3)
LYMPHOCYTES NFR BLD AUTO: 3.8 %
MCH RBC QN AUTO: 34.1 PG (ref 26.5–33)
MCHC RBC AUTO-ENTMCNC: 34.2 G/DL (ref 31.5–36.5)
MCV RBC AUTO: 100 FL (ref 78–100)
MONOCYTES # BLD AUTO: 0.2 10E9/L (ref 0–1.3)
MONOCYTES NFR BLD AUTO: 2.6 %
NEUTROPHILS # BLD AUTO: 5.4 10E9/L (ref 1.6–8.3)
NEUTROPHILS NFR BLD AUTO: 93.2 %
NRBC # BLD AUTO: 0 10*3/UL
NRBC BLD AUTO-RTO: 0 /100
PLATELET # BLD AUTO: 138 10E9/L (ref 150–450)
RBC # BLD AUTO: 3.55 10E12/L (ref 3.8–5.2)
WBC # BLD AUTO: 5.8 10E9/L (ref 4–11)

## 2017-06-14 PROCEDURE — 96415 CHEMO IV INFUSION ADDL HR: CPT

## 2017-06-14 PROCEDURE — 96401 CHEMO ANTI-NEOPL SQ/IM: CPT

## 2017-06-14 PROCEDURE — 96367 TX/PROPH/DG ADDL SEQ IV INF: CPT

## 2017-06-14 PROCEDURE — A9270 NON-COVERED ITEM OR SERVICE: HCPCS | Mod: GY | Performed by: INTERNAL MEDICINE

## 2017-06-14 PROCEDURE — 96413 CHEMO IV INFUSION 1 HR: CPT

## 2017-06-14 PROCEDURE — 25000132 ZZH RX MED GY IP 250 OP 250 PS 637: Mod: GY | Performed by: INTERNAL MEDICINE

## 2017-06-14 PROCEDURE — 85025 COMPLETE CBC W/AUTO DIFF WBC: CPT | Performed by: INTERNAL MEDICINE

## 2017-06-14 PROCEDURE — 25000128 H RX IP 250 OP 636: Performed by: INTERNAL MEDICINE

## 2017-06-14 RX ORDER — HEPARIN SODIUM (PORCINE) LOCK FLUSH IV SOLN 100 UNIT/ML 100 UNIT/ML
5 SOLUTION INTRAVENOUS EVERY 8 HOURS
Status: DISCONTINUED | OUTPATIENT
Start: 2017-06-14 | End: 2017-06-14 | Stop reason: HOSPADM

## 2017-06-14 RX ORDER — ACETAMINOPHEN 325 MG/1
650 TABLET ORAL ONCE
Status: COMPLETED | OUTPATIENT
Start: 2017-06-14 | End: 2017-06-14

## 2017-06-14 RX ADMIN — SODIUM CHLORIDE 250 ML: 9 INJECTION, SOLUTION INTRAVENOUS at 09:51

## 2017-06-14 RX ADMIN — ACETAMINOPHEN 650 MG: 325 TABLET ORAL at 09:51

## 2017-06-14 RX ADMIN — DEXAMETHASONE SODIUM PHOSPHATE 12 MG: 10 INJECTION, SOLUTION INTRAMUSCULAR; INTRAVENOUS at 09:51

## 2017-06-14 RX ADMIN — DIPHENHYDRAMINE HYDROCHLORIDE 50 MG: 50 INJECTION, SOLUTION INTRAMUSCULAR; INTRAVENOUS at 10:14

## 2017-06-14 RX ADMIN — BORTEZOMIB 2.2 MG: 3.5 INJECTION, POWDER, LYOPHILIZED, FOR SOLUTION INTRAVENOUS; SUBCUTANEOUS at 14:26

## 2017-06-14 RX ADMIN — DARATUMUMAB 1000 MG: 100 INJECTION, SOLUTION, CONCENTRATE INTRAVENOUS at 10:33

## 2017-06-14 RX ADMIN — SODIUM CHLORIDE, PRESERVATIVE FREE 5 ML: 5 INJECTION INTRAVENOUS at 14:30

## 2017-06-14 NOTE — MR AVS SNAPSHOT
After Visit Summary   6/14/2017    Amira Arreola    MRN: [id number]           Patient Information     Date Of Birth          7/17/1932        Visit Information        Provider Department      6/14/2017 8:30 AM  INFUSION CHAIR 12 [facility]        Today's Diagnoses     Multiple myeloma not having achieved remission (H)    -  1       Follow-ups after your visit        Your next 10 appointments already scheduled     Jun 21, 2017  9:00 AM CDT   Level 7 with  INFUSION CHAIR 15   [facility] ([facility])    [facility]  [address]  [facility] [address]   [phone]            Jun 21, 2017 10:00 AM CDT   Return Visit with Shayne Roberts MD   [facility] ([facility])    [facility]  [address]  [facility] [address]   [phone]            Jun 22, 2017 10:00 AM CDT   Anticoagulation Visit with [facility]   [facility] [place] [id number] [phone]            Jun 23, 2017  9:15 AM CDT   Return Visit with Ramos Rivera MD   [facility] ([facility])    [address] [place] [address] [address]  [facility] [address] [phone]              Who to contact     If you have questions or need follow up information about today's clinic visit or your schedule please contact [facility] directly at [phone].  Normal or non-critical lab and imaging results will be communicated to you by MyChart, letter or phone within 4 business days after the clinic has received the results. If you do not hear from us within 7 days, please contact the clinic through MyChart or phone. If you have a critical or abnormal lab result, we will notify you by phone as soon as  "possible.  Submit refill requests through [employer] or call your pharmacy and they will forward the refill request to us. Please allow 3 business days for your refill to be completed.          Additional Information About Your Visit        [employer] Information     [employer] lets you send messages to your doctor, view your test results, renew your prescriptions, schedule appointments and more. To sign up, go to [email or web address] . Click on \"Log in\" on the left side of the screen, which will take you to the Welcome page. Then click on \"Sign up Now\" on the right side of the page.     You will be asked to enter the access code listed below, as well as some personal information. Please follow the directions to create your username and password.     Your access code is: [email or web address]  Expires: 2017 11:09 AM     Your access code will  in 90 days. If you need help or a new code, please call your [place] clinic or [phone].        Care EveryWhere ID     This is your Care EveryWhere ID. This could be used by other organizations to access your [place] medical records  [id number]        Your Vitals Were     Pulse Temperature Respirations Height BMI (Body Mass Index)       77 98.2  F (36.8  C) (Oral) 16 1.676 m (5' 5.98\") 22.41 kg/m2        Blood Pressure from Last 3 Encounters:   17 124/74   17 135/73   17 136/72    Weight from Last 3 Encounters:   17 63 kg (138 lb 12.8 oz)   17 62.5 kg (137 lb 12.6 oz)   17 66.1 kg (145 lb 12.8 oz)              We Performed the Following     CBC with platelets differential        Primary Care Provider Office Phone # Fax #    Addy Frias [phone]       [facility] [address] [facility] [address]  [place] [address]        Thank you!     Thank you for choosing [facility]  for your care. Our goal is always to provide you with excellent care. Hearing back from our patients is " one way we can continue to improve our services. Please take a few minutes to complete the written survey that you may receive in the mail after your visit with us. Thank you!             Your Updated Medication List - Protect others around you: Learn how to safely use, store and throw away your medicines at www.disposemymeds.org.          This list is accurate as of: 6/14/17  2:37 PM.  Always use your most recent med list.                   Brand Name Dispense Instructions for use    acyclovir 400 MG tablet    ZOVIRAX    60 tablet    Take 1 tablet (400 mg) by mouth 2 times daily Viral Prophylaxis.       ASPIRIN NOT PRESCRIBED    INTENTIONAL    0 each    Antiplatelet medication not prescribed intentionally due to Current anticoagulant therapy (warfarin/enoxaparin)       * ATIVAN 0.5 MG tablet   Generic drug:  LORazepam     10 tablet    1 hour prior to MRI take 1 tablet (0.5 mg) and may repeat x's 1.       * LORazepam 0.5 MG tablet    ATIVAN    20 tablet    Take 1 tablet (0.5 mg) by mouth every 8 hours as needed for anxiety       CALCIUM CITRATE + PO      Take 2,000 mg by mouth 2 tabs       carboxymethylcellulose 0.5 % Soln ophthalmic solution    REFRESH PLUS     1 drop 4 times daily       CLARINEX PO      Taking claritin       COMPAZINE PO      Take 10 mg by mouth daily as needed       cycloSPORINE 0.05 % ophthalmic emulsion    RESTASIS     Place 1 drop into both eyes every 12 hours       DAILY MULTIVITAMIN PO      Take 1 tablet by mouth daily.       dexamethasone 4 MG tablet    DECADRON    28 tablet    Take 20mg (5 tabs) by mouth every week the morning of velcade injection. Then take 1 tab (4mg) by mouth for two days after darzalex infusion.       erythromycin ophthalmic ointment    ROMYCIN     Place 1 Application into both eyes At Bedtime       furosemide 20 MG tablet    LASIX    90 tablet    Take 1 tablet (20 mg) by mouth daily       GENTLE STOOL SOFTENER PO      Take 100 mg by mouth daily        lidocaine-prilocaine cream    EMLA    30 g    Apply topically as needed for moderate pain Apply dollop size amount to port site 30-60 min prior to accessing       metoprolol 50 MG 24 hr tablet    TOPROL-XL    180 tablet    Take 1 tablet (50 mg) by mouth 2 times daily       * OXYCODONE HCL PO      Take by mouth as needed       * oxyCODONE 15 MG IR tablet    ROXICODONE    90 tablet    Take 1 tablet (15 mg) by mouth every 8 hours as needed for pain maximum 4 tablet(s) per day       polyethylene glycol powder    MIRALAX/GLYCOLAX     Take 1 capful by mouth daily as needed       timolol 0.25 % ophthalmic solution    TIMOPTIC     1 drop every morning       TYLENOL PO      Take 500 mg by mouth every 6 hours as needed for mild pain or fever       UNABLE TO FIND      MEDICATION NAME: Fresh Coat eye drops       VITAMIN D3 PO      Take 1,000 Units by mouth daily       warfarin 4 MG tablet    COUMADIN    120 tablet    Take 6 mg on Mon, Wed, Fri; 4 mg all other days or as directed by INR clinic.       ZOMETA IV      Inject into the vein every 30 days Every 3 month dosing       * Notice:  This list has 4 medication(s) that are the same as other medications prescribed for you. Read the directions carefully, and ask your doctor or other care provider to review them with you.

## 2017-06-14 NOTE — PROGRESS NOTES
Infusion Nursing Note:  Amira Arreola presents today for C1D15 Darzalex/Velcade.    Patient seen by provider today: No   present during visit today: Not Applicable.    Note: N/A.    Intravenous Access:  Labs drawn without difficulty.  Implanted Port.    Treatment Conditions:  Lab Results   Component Value Date    HGB 12.1 06/14/2017     Lab Results   Component Value Date    WBC 5.8 06/14/2017      Lab Results   Component Value Date    ANEU 5.4 06/14/2017     Lab Results   Component Value Date     06/14/2017      Results reviewed, labs MET treatment parameters, ok to proceed with treatment.      Post Infusion Assessment:  Patient tolerated infusion without incident.  Blood return noted pre and post infusion.  Site patent and intact, free from redness, edema or discomfort.  No evidence of extravasations.  Access discontinued per protocol.    Discharge Plan:   Discharge instructions reviewed with: Patient and Family.  Patient and/or family verbalized understanding of discharge instructions and all questions answered.  Copy of AVS reviewed with patient and/or family.  Patient will return 6/21/17 for next appointment.  Patient discharged in stable condition accompanied by: daughter.  Departure Mode: Ambulatory.    David Kearney RN

## 2017-06-16 RX ORDER — EPINEPHRINE 1 MG/ML
0.3 INJECTION INTRAMUSCULAR; INTRAVENOUS; SUBCUTANEOUS EVERY 5 MIN PRN
Status: CANCELLED | OUTPATIENT
Start: 2017-06-28

## 2017-06-16 RX ORDER — LORAZEPAM 2 MG/ML
0.5 INJECTION INTRAMUSCULAR EVERY 4 HOURS PRN
Status: CANCELLED
Start: 2017-06-28

## 2017-06-16 RX ORDER — EPINEPHRINE 1 MG/ML
0.3 INJECTION INTRAMUSCULAR; INTRAVENOUS; SUBCUTANEOUS EVERY 5 MIN PRN
Status: CANCELLED | OUTPATIENT
Start: 2017-07-12

## 2017-06-16 RX ORDER — ACETAMINOPHEN 325 MG/1
650 TABLET ORAL ONCE
Status: CANCELLED | OUTPATIENT
Start: 2017-07-19

## 2017-06-16 RX ORDER — ALBUTEROL SULFATE 0.83 MG/ML
2.5 SOLUTION RESPIRATORY (INHALATION)
Status: CANCELLED | OUTPATIENT
Start: 2017-07-05

## 2017-06-16 RX ORDER — HEPARIN SODIUM (PORCINE) LOCK FLUSH IV SOLN 100 UNIT/ML 100 UNIT/ML
5 SOLUTION INTRAVENOUS EVERY 8 HOURS
Status: CANCELLED
Start: 2017-07-12

## 2017-06-16 RX ORDER — LORAZEPAM 2 MG/ML
0.5 INJECTION INTRAMUSCULAR EVERY 4 HOURS PRN
Status: CANCELLED
Start: 2017-07-12

## 2017-06-16 RX ORDER — ALBUTEROL SULFATE 0.83 MG/ML
2.5 SOLUTION RESPIRATORY (INHALATION)
Status: CANCELLED | OUTPATIENT
Start: 2017-06-28

## 2017-06-16 RX ORDER — DIPHENHYDRAMINE HCL 25 MG
50 CAPSULE ORAL ONCE
Status: CANCELLED | OUTPATIENT
Start: 2017-07-05

## 2017-06-16 RX ORDER — ALBUTEROL SULFATE 90 UG/1
1-2 AEROSOL, METERED RESPIRATORY (INHALATION)
Status: CANCELLED
Start: 2017-07-12

## 2017-06-16 RX ORDER — EPINEPHRINE 1 MG/ML
0.3 INJECTION INTRAMUSCULAR; INTRAVENOUS; SUBCUTANEOUS EVERY 5 MIN PRN
Status: CANCELLED | OUTPATIENT
Start: 2017-07-05

## 2017-06-16 RX ORDER — HEPARIN SODIUM (PORCINE) LOCK FLUSH IV SOLN 100 UNIT/ML 100 UNIT/ML
5 SOLUTION INTRAVENOUS EVERY 8 HOURS
Status: CANCELLED
Start: 2017-06-28

## 2017-06-16 RX ORDER — EPINEPHRINE 0.3 MG/.3ML
0.3 INJECTION SUBCUTANEOUS EVERY 5 MIN PRN
Status: CANCELLED | OUTPATIENT
Start: 2017-07-12

## 2017-06-16 RX ORDER — SODIUM CHLORIDE 9 MG/ML
1000 INJECTION, SOLUTION INTRAVENOUS CONTINUOUS PRN
Status: CANCELLED
Start: 2017-07-05

## 2017-06-16 RX ORDER — SODIUM CHLORIDE 9 MG/ML
1000 INJECTION, SOLUTION INTRAVENOUS CONTINUOUS PRN
Status: CANCELLED
Start: 2017-07-19

## 2017-06-16 RX ORDER — DIPHENHYDRAMINE HYDROCHLORIDE 50 MG/ML
50 INJECTION INTRAMUSCULAR; INTRAVENOUS
Status: CANCELLED
Start: 2017-06-28

## 2017-06-16 RX ORDER — MEPERIDINE HYDROCHLORIDE 50 MG/ML
25 INJECTION INTRAMUSCULAR; INTRAVENOUS; SUBCUTANEOUS EVERY 30 MIN PRN
Status: CANCELLED | OUTPATIENT
Start: 2017-07-05

## 2017-06-16 RX ORDER — HEPARIN SODIUM (PORCINE) LOCK FLUSH IV SOLN 100 UNIT/ML 100 UNIT/ML
5 SOLUTION INTRAVENOUS EVERY 8 HOURS
Status: CANCELLED
Start: 2017-07-19

## 2017-06-16 RX ORDER — ALBUTEROL SULFATE 0.83 MG/ML
2.5 SOLUTION RESPIRATORY (INHALATION)
Status: CANCELLED | OUTPATIENT
Start: 2017-07-12

## 2017-06-16 RX ORDER — MEPERIDINE HYDROCHLORIDE 50 MG/ML
25 INJECTION INTRAMUSCULAR; INTRAVENOUS; SUBCUTANEOUS EVERY 30 MIN PRN
Status: CANCELLED | OUTPATIENT
Start: 2017-07-12

## 2017-06-16 RX ORDER — ALBUTEROL SULFATE 90 UG/1
1-2 AEROSOL, METERED RESPIRATORY (INHALATION)
Status: CANCELLED
Start: 2017-07-19

## 2017-06-16 RX ORDER — EPINEPHRINE 1 MG/ML
0.3 INJECTION INTRAMUSCULAR; INTRAVENOUS; SUBCUTANEOUS EVERY 5 MIN PRN
Status: CANCELLED | OUTPATIENT
Start: 2017-07-19

## 2017-06-16 RX ORDER — ACETAMINOPHEN 325 MG/1
650 TABLET ORAL ONCE
Status: CANCELLED | OUTPATIENT
Start: 2017-07-12

## 2017-06-16 RX ORDER — DIPHENHYDRAMINE HYDROCHLORIDE 50 MG/ML
50 INJECTION INTRAMUSCULAR; INTRAVENOUS
Status: CANCELLED
Start: 2017-07-19

## 2017-06-16 RX ORDER — DIPHENHYDRAMINE HCL 25 MG
50 CAPSULE ORAL ONCE
Status: CANCELLED | OUTPATIENT
Start: 2017-07-19

## 2017-06-16 RX ORDER — EPINEPHRINE 0.3 MG/.3ML
0.3 INJECTION SUBCUTANEOUS EVERY 5 MIN PRN
Status: CANCELLED | OUTPATIENT
Start: 2017-07-19

## 2017-06-16 RX ORDER — EPINEPHRINE 0.3 MG/.3ML
0.3 INJECTION SUBCUTANEOUS EVERY 5 MIN PRN
Status: CANCELLED | OUTPATIENT
Start: 2017-07-05

## 2017-06-16 RX ORDER — LORAZEPAM 2 MG/ML
0.5 INJECTION INTRAMUSCULAR EVERY 4 HOURS PRN
Status: CANCELLED
Start: 2017-07-05

## 2017-06-16 RX ORDER — DIPHENHYDRAMINE HCL 25 MG
50 CAPSULE ORAL ONCE
Status: CANCELLED | OUTPATIENT
Start: 2017-06-28

## 2017-06-16 RX ORDER — DIPHENHYDRAMINE HYDROCHLORIDE 50 MG/ML
50 INJECTION INTRAMUSCULAR; INTRAVENOUS
Status: CANCELLED
Start: 2017-07-05

## 2017-06-16 RX ORDER — METHYLPREDNISOLONE SODIUM SUCCINATE 125 MG/2ML
125 INJECTION, POWDER, LYOPHILIZED, FOR SOLUTION INTRAMUSCULAR; INTRAVENOUS
Status: CANCELLED
Start: 2017-06-28

## 2017-06-16 RX ORDER — HEPARIN SODIUM (PORCINE) LOCK FLUSH IV SOLN 100 UNIT/ML 100 UNIT/ML
5 SOLUTION INTRAVENOUS EVERY 8 HOURS
Status: CANCELLED
Start: 2017-07-05

## 2017-06-16 RX ORDER — LORAZEPAM 2 MG/ML
0.5 INJECTION INTRAMUSCULAR EVERY 4 HOURS PRN
Status: CANCELLED
Start: 2017-07-19

## 2017-06-16 RX ORDER — ALBUTEROL SULFATE 0.83 MG/ML
2.5 SOLUTION RESPIRATORY (INHALATION)
Status: CANCELLED | OUTPATIENT
Start: 2017-07-19

## 2017-06-16 RX ORDER — METHYLPREDNISOLONE SODIUM SUCCINATE 125 MG/2ML
125 INJECTION, POWDER, LYOPHILIZED, FOR SOLUTION INTRAMUSCULAR; INTRAVENOUS
Status: CANCELLED
Start: 2017-07-05

## 2017-06-16 RX ORDER — EPINEPHRINE 0.3 MG/.3ML
0.3 INJECTION SUBCUTANEOUS EVERY 5 MIN PRN
Status: CANCELLED | OUTPATIENT
Start: 2017-06-28

## 2017-06-16 RX ORDER — METHYLPREDNISOLONE SODIUM SUCCINATE 125 MG/2ML
125 INJECTION, POWDER, LYOPHILIZED, FOR SOLUTION INTRAMUSCULAR; INTRAVENOUS
Status: CANCELLED
Start: 2017-07-12

## 2017-06-16 RX ORDER — METHYLPREDNISOLONE SODIUM SUCCINATE 125 MG/2ML
125 INJECTION, POWDER, LYOPHILIZED, FOR SOLUTION INTRAMUSCULAR; INTRAVENOUS
Status: CANCELLED
Start: 2017-07-19

## 2017-06-16 RX ORDER — ALBUTEROL SULFATE 90 UG/1
1-2 AEROSOL, METERED RESPIRATORY (INHALATION)
Status: CANCELLED
Start: 2017-07-05

## 2017-06-16 RX ORDER — DIPHENHYDRAMINE HYDROCHLORIDE 50 MG/ML
50 INJECTION INTRAMUSCULAR; INTRAVENOUS
Status: CANCELLED
Start: 2017-07-12

## 2017-06-16 RX ORDER — MEPERIDINE HYDROCHLORIDE 50 MG/ML
25 INJECTION INTRAMUSCULAR; INTRAVENOUS; SUBCUTANEOUS EVERY 30 MIN PRN
Status: CANCELLED | OUTPATIENT
Start: 2017-06-28

## 2017-06-16 RX ORDER — ACETAMINOPHEN 325 MG/1
650 TABLET ORAL ONCE
Status: CANCELLED | OUTPATIENT
Start: 2017-06-28

## 2017-06-16 RX ORDER — SODIUM CHLORIDE 9 MG/ML
1000 INJECTION, SOLUTION INTRAVENOUS CONTINUOUS PRN
Status: CANCELLED
Start: 2017-07-12

## 2017-06-16 RX ORDER — ACETAMINOPHEN 325 MG/1
650 TABLET ORAL ONCE
Status: CANCELLED | OUTPATIENT
Start: 2017-07-05

## 2017-06-16 RX ORDER — ALBUTEROL SULFATE 90 UG/1
1-2 AEROSOL, METERED RESPIRATORY (INHALATION)
Status: CANCELLED
Start: 2017-06-28

## 2017-06-16 RX ORDER — SODIUM CHLORIDE 9 MG/ML
1000 INJECTION, SOLUTION INTRAVENOUS CONTINUOUS PRN
Status: CANCELLED
Start: 2017-06-28

## 2017-06-16 RX ORDER — DIPHENHYDRAMINE HCL 25 MG
50 CAPSULE ORAL ONCE
Status: CANCELLED | OUTPATIENT
Start: 2017-07-12

## 2017-06-16 RX ORDER — MEPERIDINE HYDROCHLORIDE 50 MG/ML
25 INJECTION INTRAMUSCULAR; INTRAVENOUS; SUBCUTANEOUS EVERY 30 MIN PRN
Status: CANCELLED | OUTPATIENT
Start: 2017-07-19

## 2017-06-21 ENCOUNTER — INFUSION THERAPY VISIT (OUTPATIENT)
Dept: INFUSION THERAPY | Facility: CLINIC | Age: 82
End: 2017-06-21
Attending: INTERNAL MEDICINE
Payer: MEDICARE

## 2017-06-21 ENCOUNTER — HOSPITAL ENCOUNTER (OUTPATIENT)
Facility: CLINIC | Age: 82
Setting detail: SPECIMEN
Discharge: HOME | End: 2017-06-21
Attending: INTERNAL MEDICINE | Admitting: INTERNAL MEDICINE
Payer: MEDICARE

## 2017-06-21 ENCOUNTER — ONCOLOGY VISIT (OUTPATIENT)
Dept: ONCOLOGY | Facility: CLINIC | Age: 82
End: 2017-06-21
Attending: INTERNAL MEDICINE
Payer: MEDICARE

## 2017-06-21 VITALS
TEMPERATURE: 98.2 F | RESPIRATION RATE: 16 BRPM | OXYGEN SATURATION: 98 % | WEIGHT: 140 LBS | HEART RATE: 77 BPM | BODY MASS INDEX: 22.5 KG/M2 | DIASTOLIC BLOOD PRESSURE: 78 MMHG | SYSTOLIC BLOOD PRESSURE: 142 MMHG | HEIGHT: 66 IN

## 2017-06-21 VITALS
DIASTOLIC BLOOD PRESSURE: 83 MMHG | TEMPERATURE: 98 F | OXYGEN SATURATION: 98 % | WEIGHT: 140 LBS | HEIGHT: 66 IN | SYSTOLIC BLOOD PRESSURE: 144 MMHG | BODY MASS INDEX: 22.5 KG/M2 | HEART RATE: 75 BPM | RESPIRATION RATE: 18 BRPM

## 2017-06-21 DIAGNOSIS — C90.00 MULTIPLE MYELOMA NOT HAVING ACHIEVED REMISSION (H): Primary | ICD-10-CM

## 2017-06-21 LAB
BASOPHILS # BLD AUTO: 0 10E9/L (ref 0–0.2)
BASOPHILS NFR BLD AUTO: 0 %
DIFFERENTIAL METHOD BLD: ABNORMAL
EOSINOPHIL # BLD AUTO: 0 10E9/L (ref 0–0.7)
EOSINOPHIL NFR BLD AUTO: 0.2 %
ERYTHROCYTE [DISTWIDTH] IN BLOOD BY AUTOMATED COUNT: 15.1 % (ref 10–15)
HCT VFR BLD AUTO: 34.4 % (ref 35–47)
HGB BLD-MCNC: 11.6 G/DL (ref 11.7–15.7)
IMM GRANULOCYTES # BLD: 0 10E9/L (ref 0–0.4)
IMM GRANULOCYTES NFR BLD: 0.2 %
LYMPHOCYTES # BLD AUTO: 0.3 10E9/L (ref 0.8–5.3)
LYMPHOCYTES NFR BLD AUTO: 8.1 %
MCH RBC QN AUTO: 34 PG (ref 26.5–33)
MCHC RBC AUTO-ENTMCNC: 33.7 G/DL (ref 31.5–36.5)
MCV RBC AUTO: 101 FL (ref 78–100)
MONOCYTES # BLD AUTO: 0.1 10E9/L (ref 0–1.3)
MONOCYTES NFR BLD AUTO: 2.8 %
NEUTROPHILS # BLD AUTO: 3.7 10E9/L (ref 1.6–8.3)
NEUTROPHILS NFR BLD AUTO: 88.7 %
NRBC # BLD AUTO: 0 10*3/UL
NRBC BLD AUTO-RTO: 0 /100
PLATELET # BLD AUTO: 124 10E9/L (ref 150–450)
RBC # BLD AUTO: 3.41 10E12/L (ref 3.8–5.2)
WBC # BLD AUTO: 4.2 10E9/L (ref 4–11)

## 2017-06-21 PROCEDURE — 25000132 ZZH RX MED GY IP 250 OP 250 PS 637: Mod: GY | Performed by: INTERNAL MEDICINE

## 2017-06-21 PROCEDURE — 96401 CHEMO ANTI-NEOPL SQ/IM: CPT

## 2017-06-21 PROCEDURE — 25000128 H RX IP 250 OP 636: Performed by: INTERNAL MEDICINE

## 2017-06-21 PROCEDURE — 99214 OFFICE O/P EST MOD 30 MIN: CPT | Performed by: INTERNAL MEDICINE

## 2017-06-21 PROCEDURE — 96415 CHEMO IV INFUSION ADDL HR: CPT

## 2017-06-21 PROCEDURE — 96413 CHEMO IV INFUSION 1 HR: CPT

## 2017-06-21 PROCEDURE — 85025 COMPLETE CBC W/AUTO DIFF WBC: CPT | Performed by: INTERNAL MEDICINE

## 2017-06-21 PROCEDURE — 96367 TX/PROPH/DG ADDL SEQ IV INF: CPT

## 2017-06-21 PROCEDURE — A9270 NON-COVERED ITEM OR SERVICE: HCPCS | Mod: GY | Performed by: INTERNAL MEDICINE

## 2017-06-21 RX ORDER — ACETAMINOPHEN 325 MG/1
650 TABLET ORAL ONCE
Status: COMPLETED | OUTPATIENT
Start: 2017-06-21 | End: 2017-06-21

## 2017-06-21 RX ORDER — HEPARIN SODIUM (PORCINE) LOCK FLUSH IV SOLN 100 UNIT/ML 100 UNIT/ML
5 SOLUTION INTRAVENOUS EVERY 8 HOURS
Status: DISCONTINUED | OUTPATIENT
Start: 2017-06-21 | End: 2017-06-21 | Stop reason: HOSPADM

## 2017-06-21 RX ORDER — DIPHENHYDRAMINE HCL 25 MG
50 CAPSULE ORAL ONCE
Status: COMPLETED | OUTPATIENT
Start: 2017-06-21 | End: 2017-06-21

## 2017-06-21 RX ORDER — OXYCODONE HYDROCHLORIDE 15 MG/1
15 TABLET ORAL EVERY 8 HOURS PRN
Qty: 90 TABLET | Refills: 0 | Status: SHIPPED | OUTPATIENT
Start: 2017-06-21 | End: 2017-07-19

## 2017-06-21 RX ADMIN — DEXAMETHASONE SODIUM PHOSPHATE 12 MG: 10 INJECTION, SOLUTION INTRAMUSCULAR; INTRAVENOUS at 10:29

## 2017-06-21 RX ADMIN — DARATUMUMAB 1000 MG: 100 INJECTION, SOLUTION, CONCENTRATE INTRAVENOUS at 11:20

## 2017-06-21 RX ADMIN — SODIUM CHLORIDE 250 ML: 9 INJECTION, SOLUTION INTRAVENOUS at 10:27

## 2017-06-21 RX ADMIN — SODIUM CHLORIDE, PRESERVATIVE FREE 5 ML: 5 INJECTION INTRAVENOUS at 14:38

## 2017-06-21 RX ADMIN — DIPHENHYDRAMINE HYDROCHLORIDE 50 MG: 25 CAPSULE ORAL at 10:26

## 2017-06-21 RX ADMIN — BORTEZOMIB 2.2 MG: 3.5 INJECTION, POWDER, LYOPHILIZED, FOR SOLUTION INTRAVENOUS; SUBCUTANEOUS at 11:07

## 2017-06-21 RX ADMIN — ACETAMINOPHEN 650 MG: 325 TABLET ORAL at 10:26

## 2017-06-21 ASSESSMENT — PAIN SCALES - GENERAL
PAINLEVEL: NO PAIN (0)
PAINLEVEL: NO PAIN (0)

## 2017-06-21 NOTE — PATIENT INSTRUCTIONS
Continue chemotherapy.  Follow up in 3-4 weeks.  Scheduled/Dorita    AVS printed & given to patient/Dorita

## 2017-06-21 NOTE — PROGRESS NOTES
Infusion Nursing Note:  Amiragino Arreola presents today for C1D22 velcade, daralex.    Patient seen by provider today: yes, Dr. Roberts   present during visit today: Not Applicable.    Note: N/A.    Intravenous Access:  Labs drawn without difficulty.  Implanted Port.    Treatment Conditions:  Lab Results   Component Value Date    HGB 11.6 06/21/2017     Lab Results   Component Value Date    WBC 4.2 06/21/2017      Lab Results   Component Value Date    ANEU 3.7 06/21/2017     Lab Results   Component Value Date     06/21/2017      Results reviewed, labs MET treatment parameters, ok to proceed with treatment.      Post Infusion Assessment:  Patient tolerated infusion without incident.  Blood return noted pre and post infusion.  Site patent and intact, free from redness, edema or discomfort.  No evidence of extravasations.  Access discontinued per protocol.    Discharge Plan:   Patient declined prescription refills.  Discharge instructions reviewed with: Patient.  Patient and/or family verbalized understanding of discharge instructions and all questions answered.  Copy of AVS reviewed with patient and/or family.  Patient will return 6/28/17 for next appointment.  Patient discharged in stable condition accompanied by: self.  Departure Mode: Ambulatory.    Fanny Bob RN

## 2017-06-21 NOTE — MR AVS SNAPSHOT
After Visit Summary   6/21/2017    Amira Arreola    MRN: [id number]           Patient Information     Date Of Birth          7/17/1932        Visit Information        Provider Department      6/21/2017 9:00 AM  INFUSION CHAIR 15 [facility]        Today's Diagnoses     Multiple myeloma not having achieved remission (H)    -  1       Follow-ups after your visit        Your next 10 appointments already scheduled     Jun 22, 2017 10:00 AM CDT   Anticoagulation Visit with EC ANTICOAGULATION CLINIC   [facility] ([address] [address]   [phone]            Jun 23, 2017  9:15 AM CDT   Return Visit with Ramos Rivera MD   [facility] ([facility])    [address] [place] [address] [address]  [place] [address]   [phone]            Jun 28, 2017  9:00 AM CDT   Level 7 with  INFUSION CHAIR 5   [facility] ([facility])    [facility]  [address]  [place] [address]   [phone]            Jul 05, 2017  9:30 AM CDT   Level 7 with  INFUSION CHAIR 11   [facility] ([facility])    [facility]  [address]  [place] [address]   [phone]            Jul 12, 2017  8:30 AM CDT   Level 7 with  INFUSION CHAIR 9   [facility] ([facility])    [facility] [place]  [address]  [place] [address]   [phone]            Jul 19, 2017  9:00 AM CDT   Level 7 with  INFUSION CHAIR 13   [facility] ([facility])    [facility]  [address]  [place] [address]   [phone]            Jul 19, 2017  1:00 PM CDT   Return Visit with  "Shayne Roberts MD   [facility] ([facility])    [facility] [place]  [address]  [place] [address] [phone]              Who to contact     If you have questions or need follow up information about today's clinic visit or your schedule please contact [facility] directly at [phone].  Normal or non-critical lab and imaging results will be communicated to you by [employer], letter or phone within 4 business days after the clinic has received the results. If you do not hear from us within 7 days, please contact the clinic through [employer] or phone. If you have a critical or abnormal lab result, we will notify you by phone as soon as possible.  Submit refill requests through [employer] or call your pharmacy and they will forward the refill request to us. Please allow 3 business days for your refill to be completed.          Additional Information About Your Visit        [employer] Information     [employer] lets you send messages to your doctor, view your test results, renew your prescriptions, schedule appointments and more. To sign up, go to [email or web address] . Click on \"Log in\" on the left side of the screen, which will take you to the Welcome page. Then click on \"Sign up Now\" on the right side of the page.     You will be asked to enter the access code listed below, as well as some personal information. Please follow the directions to create your username and password.     Your access code is: [email or web address]  Expires: 2017 11:09 AM     Your access code will  in 90 days. If you need help or a new code, please call your [place] clinic or [phone].        Care EveryWhere ID     This is your Care EveryWhere ID. This could be used by other organizations to access your [place] medical records  [id number]        Your Vitals Were     Pulse Temperature Respirations Height Pulse Oximetry BMI (Body Mass Index)    77 " "98.2  F (36.8  C) (Oral) 16 1.676 m (5' 5.98\") 98% 22.61 kg/m2       Blood Pressure from Last 3 Encounters:   06/21/17 144/83   06/21/17 142/78   06/14/17 124/74    Weight from Last 3 Encounters:   06/21/17 63.5 kg (140 lb)   06/21/17 63.5 kg (140 lb)   06/14/17 63 kg (138 lb 12.8 oz)              We Performed the Following     CBC with platelets differential          Today's Medication Changes          These changes are accurate as of: 6/21/17  3:43 PM.  If you have any questions, ask your nurse or doctor.               These medicines have changed or have updated prescriptions.        Dose/Directions    LORazepam 0.5 MG tablet   Commonly known as:  ATIVAN   This may have changed:  Another medication with the same name was removed. Continue taking this medication, and follow the directions you see here.   Used for:  Multiple myeloma not having achieved remission (H)   Changed by:  Shayne Roberts MD        Dose:  0.5 mg   Take 1 tablet (0.5 mg) by mouth every 8 hours as needed for anxiety   Quantity:  20 tablet   Refills:  3       oxyCODONE 15 MG IR tablet   Commonly known as:  ROXICODONE   This may have changed:  Another medication with the same name was removed. Continue taking this medication, and follow the directions you see here.   Used for:  Multiple myeloma not having achieved remission (H)   Changed by:  Shayne Roberts MD        Dose:  15 mg   Take 1 tablet (15 mg) by mouth every 8 hours as needed for pain maximum 4 tablet(s) per day   Quantity:  90 tablet   Refills:  0            Where to get your medicines      Some of these will need a paper prescription and others can be bought over the counter.  Ask your nurse if you have questions.     Bring a paper prescription for each of these medications     oxyCODONE 15 MG IR tablet                Primary Care Provider Office Phone # Fax #    Addy Frias [phone]       [facility] [address]  [place] " [address]        Equal Access to Services     [facility]: Hadii liane murcia nichol Galloway, wajanetda luqalexis, qayazmin christianrandykira gupta, hung maradiagaedgardoporfirio sadler. So [facility] [phone].    ATENCIÓN: Si habla español, tiene a barlow disposición servicios gratuitos de asistencia lingüística. Barame al [phone].    We comply with applicable federal civil rights laws and [place] laws. We do not discriminate on the basis of race, color, national origin, age, disability sex, sexual orientation or gender identity.            Thank you!     Thank you for choosing [facility]  for your care. Our goal is always to provide you with excellent care. Hearing back from our patients is one way we can continue to improve our services. Please take a few minutes to complete the written survey that you may receive in the mail after your visit with us. Thank you!             Your Updated Medication List - Protect others around you: Learn how to safely use, store and throw away your medicines at www.disposemymeds.org.          This list is accurate as of: 6/21/17  3:43 PM.  Always use your most recent med list.                   Brand Name Dispense Instructions for use Diagnosis    acyclovir 400 MG tablet    ZOVIRAX    60 tablet    Take 1 tablet (400 mg) by mouth 2 times daily Viral Prophylaxis.    Multiple myeloma not having achieved remission (H)       ASPIRIN NOT PRESCRIBED    INTENTIONAL    0 each    Antiplatelet medication not prescribed intentionally due to Current anticoagulant therapy (warfarin/enoxaparin)    Paroxysmal atrial fibrillation (H)       CALCIUM CITRATE + PO      Take 2,000 mg by mouth 2 tabs        carboxymethylcellulose 0.5 % Soln ophthalmic solution    REFRESH PLUS     1 drop 4 times daily        CLARINEX PO      Taking claritin        COMPAZINE PO      Take 10 mg by mouth daily as needed        cycloSPORINE 0.05 % ophthalmic emulsion    RESTASIS     Place 1 drop into  both eyes every 12 hours        DAILY MULTIVITAMIN PO      Take 1 tablet by mouth daily.    Routine general medical examination at a health care facility       dexamethasone 4 MG tablet    DECADRON    28 tablet    Take 20mg (5 tabs) by mouth every week the morning of velcade injection. Then take 1 tab (4mg) by mouth for two days after darzalex infusion.    Multiple myeloma not having achieved remission (H)       erythromycin ophthalmic ointment    ROMYCIN     Place 1 Application into both eyes At Bedtime        furosemide 20 MG tablet    LASIX    90 tablet    Take 1 tablet (20 mg) by mouth daily    Paroxysmal atrial fibrillation (H)       GENTLE STOOL SOFTENER PO      Take 100 mg by mouth daily        lidocaine-prilocaine cream    EMLA    30 g    Apply topically as needed for moderate pain Apply dollop size amount to port site 30-60 min prior to accessing    Multiple myeloma not having achieved remission (H)       LORazepam 0.5 MG tablet    ATIVAN    20 tablet    Take 1 tablet (0.5 mg) by mouth every 8 hours as needed for anxiety    Multiple myeloma not having achieved remission (H)       metoprolol 50 MG 24 hr tablet    TOPROL-XL    180 tablet    Take 1 tablet (50 mg) by mouth 2 times daily    SVT (supraventricular tachycardia) (H), Paroxysmal atrial fibrillation (H)       oxyCODONE 15 MG IR tablet    ROXICODONE    90 tablet    Take 1 tablet (15 mg) by mouth every 8 hours as needed for pain maximum 4 tablet(s) per day    Multiple myeloma not having achieved remission (H)       polyethylene glycol powder    MIRALAX/GLYCOLAX     Take 1 capful by mouth daily as needed    Bilateral leg edema       timolol 0.25 % ophthalmic solution    TIMOPTIC     1 drop every morning        TYLENOL PO      Take 500 mg by mouth every 6 hours as needed for mild pain or fever        UNABLE TO FIND      MEDICATION NAME: Fresh Coat eye drops        VITAMIN D3 PO      Take 1,000 Units by mouth daily        warfarin 4 MG tablet    COUMADIN     120 tablet    Take 6 mg on Mon, Wed, Fri; 4 mg all other days or as directed by INR clinic.    Paroxysmal atrial fibrillation (H), Long-term (current) use of anticoagulants       ZOMETA IV      Inject into the vein every 30 days Every 3 month dosing

## 2017-06-21 NOTE — MR AVS SNAPSHOT
After Visit Summary   6/21/2017    Amira Arreola    MRN: [id number]           Patient Information     Date Of Birth          7/17/1932        Visit Information        Provider Department      6/21/2017 10:00 AM Shayne Roberts MD [facility]        Today's Diagnoses     Multiple myeloma not having achieved remission (H)    -  1      Care Instructions    Continue chemotherapy.  Follow up in 3-4 weeks.  Scheduled/Dorita    AVS printed & given to patient/Dorita          Follow-ups after your visit        Your next 10 appointments already scheduled     Jul 05, 2017  9:30 AM CDT   Level 7 with  INFUSION CHAIR 11   [facility] ([facility])    [facility]  [address]  [place] [address]   [phone]            Jul 07, 2017 10:30 AM CDT   Anticoagulation Visit with [facility]   [facility] [place] [address]   [phone]            Jul 12, 2017  8:30 AM CDT   Level 7 with  INFUSION CHAIR 9   [facility] ([facility])    [facility]  [address]  [place] [address]   [phone]            Jul 19, 2017  9:00 AM CDT   Level 7 with  INFUSION CHAIR 13   [facility] ([facility])    [facility] [place]  [address]  [place] [address]   [phone]            Jul 19, 2017  1:00 PM CDT   Return Visit with Shayne Roberts MD   [facility] ([facility])    [facility] [place]  [address]  [place] [address]   [phone]            Sep 21, 2017 10:45 AM CDT   Return Visit with Ramos Rivera MD   [facility] [place] ([facility])    [address] [place] [address]  [address]  [place] [address] [phone]              Who to contact     If you have questions or need follow up information about today's clinic visit or your schedule please contact [facility] directly at [phone].  Normal or non-critical lab and imaging results will be communicated to you by MyChart, letter or phone within 4 business days after the clinic has received the results. If you do not hear from us within 7 days, please contact the clinic through MyChart or phone. If you have a critical or abnormal lab result, we will notify you by phone as soon as possible.  Submit refill requests through [employer] or call your pharmacy and they will forward the refill request to us. Please allow 3 business days for your refill to be completed.          Additional Information About Your Visit        Howcasthart Information     [employer] lets you send messages to your doctor, view your test results, renew your prescriptions, schedule appointments and more. To sign up, go to [email or web address] . Click on \"Log in\" on the left side of the screen, which will take you to the Welcome page. Then click on \"Sign up Now\" on the right side of the page.     You will be asked to enter the access code listed below, as well as some personal information. Please follow the directions to create your username and password.     Your access code is: [email or web address]  Expires: 2017  2:33 PM     Your access code will  in 90 days. If you need help or a new code, please call your [facility] or [phone].        Care EveryWhere ID     This is your Care EveryWhere ID. This could be used by other organizations to access your [facility] medical records  [id number]        Your Vitals Were     Pulse Temperature Respirations Height Pulse Oximetry BMI (Body Mass Index)    75 98  F (36.7  C) (Oral) 18 1.676 m (5' 5.98\") 98% 22.61 kg/m2       Blood Pressure from Last 3 Encounters:   No data found for BP    Weight from Last 3 " Encounters:   No data found for Wt              Today, you had the following     No orders found for display         Today's Medication Changes          These changes are accurate as of: 6/21/17 11:59 PM.  If you have any questions, ask your nurse or doctor.               These medicines have changed or have updated prescriptions.        Dose/Directions    LORazepam 0.5 MG tablet   Commonly known as:  ATIVAN   This may have changed:  Another medication with the same name was removed. Continue taking this medication, and follow the directions you see here.   Used for:  Multiple myeloma not having achieved remission (H)   Changed by:  Shayne Roberts MD        Dose:  0.5 mg   Take 1 tablet (0.5 mg) by mouth every 8 hours as needed for anxiety   Quantity:  20 tablet   Refills:  3       oxyCODONE 15 MG IR tablet   Commonly known as:  ROXICODONE   This may have changed:  Another medication with the same name was removed. Continue taking this medication, and follow the directions you see here.   Used for:  Multiple myeloma not having achieved remission (H)   Changed by:  Shayne Roberts MD        Dose:  15 mg   Take 1 tablet (15 mg) by mouth every 8 hours as needed for pain maximum 4 tablet(s) per day   Quantity:  90 tablet   Refills:  0            Where to get your medicines      Some of these will need a paper prescription and others can be bought over the counter.  Ask your nurse if you have questions.     Bring a paper prescription for each of these medications     oxyCODONE 15 MG IR tablet                Primary Care Provider Office Phone # Fax #    Addy Sean Frias [phone]       [facility] [address] [facility] [address]  [facility] [address]        Equal Access to Services     [facility] AH: Hadii liane crawfordo Soyair, waaxda luqadaha, qaybta kaalmada adeashu, waxay lópez sadler. [facility] [phone].    ATENCIÓN: Si berela español, tiene a barlow disposición  servicios gratuitos de asistencia lingüística. Kadie eller [phone].    We comply with applicable federal civil rights laws and [place] laws. We do not discriminate on the basis of race, color, national origin, age, disability sex, sexual orientation or gender identity.            Thank you!     Thank you for choosing [facility]  for your care. Our goal is always to provide you with excellent care. Hearing back from our patients is one way we can continue to improve our services. Please take a few minutes to complete the written survey that you may receive in the mail after your visit with us. Thank you!             Your Updated Medication List - Protect others around you: Learn how to safely use, store and throw away your medicines at www.disposemymeds.org.          This list is accurate as of: 6/21/17 11:59 PM.  Always use your most recent med list.                   Brand Name Dispense Instructions for use Diagnosis    acyclovir 400 MG tablet    ZOVIRAX    60 tablet    Take 1 tablet (400 mg) by mouth 2 times daily Viral Prophylaxis.    Multiple myeloma not having achieved remission (H)       ASPIRIN NOT PRESCRIBED    INTENTIONAL    0 each    Antiplatelet medication not prescribed intentionally due to Current anticoagulant therapy (warfarin/enoxaparin)    Paroxysmal atrial fibrillation (H)       CALCIUM CITRATE + PO      Take 2,000 mg by mouth daily 2 tabs        carboxymethylcellulose 0.5 % Soln ophthalmic solution    REFRESH PLUS     1 drop 4 times daily        CLARINEX PO      Take by mouth daily Taking claritin        COMPAZINE PO      Take 10 mg by mouth daily as needed        cycloSPORINE 0.05 % ophthalmic emulsion    RESTASIS     Place 1 drop into both eyes every 12 hours        DAILY MULTIVITAMIN PO      Take 1 tablet by mouth daily.    Routine general medical examination at a health care facility       dexamethasone 4 MG tablet    DECADRON    28 tablet    Take 20mg (5 tabs) by mouth every  week the morning of velcade injection. Then take 1 tab (4mg) by mouth for two days after darzalex infusion.    Multiple myeloma not having achieved remission (H)       erythromycin ophthalmic ointment    ROMYCIN     Place 1 Application into both eyes At Bedtime        GENTLE STOOL SOFTENER PO      Take 100 mg by mouth daily        lidocaine-prilocaine cream    EMLA    30 g    Apply topically as needed for moderate pain Apply dollop size amount to port site 30-60 min prior to accessing    Multiple myeloma not having achieved remission (H)       LORazepam 0.5 MG tablet    ATIVAN    20 tablet    Take 1 tablet (0.5 mg) by mouth every 8 hours as needed for anxiety    Multiple myeloma not having achieved remission (H)       metoprolol 50 MG 24 hr tablet    TOPROL-XL    180 tablet    Take 1 tablet (50 mg) by mouth 2 times daily    SVT (supraventricular tachycardia) (H), Paroxysmal atrial fibrillation (H)       oxyCODONE 15 MG IR tablet    ROXICODONE    90 tablet    Take 1 tablet (15 mg) by mouth every 8 hours as needed for pain maximum 4 tablet(s) per day    Multiple myeloma not having achieved remission (H)       polyethylene glycol powder    MIRALAX/GLYCOLAX     Take 1 capful by mouth daily as needed    Bilateral leg edema       timolol 0.25 % ophthalmic solution    TIMOPTIC     1 drop every morning        TYLENOL PO      Take 500 mg by mouth every 6 hours as needed for mild pain or fever        UNABLE TO FIND      MEDICATION NAME: Fresh Coat eye drops        VITAMIN D3 PO      Take 1,000 Units by mouth daily        warfarin 4 MG tablet    COUMADIN    120 tablet    Take 6 mg on Mon, Wed, Fri; 4 mg all other days or as directed by INR clinic.    Paroxysmal atrial fibrillation (H), Long-term (current) use of anticoagulants       ZOMETA IV      Inject into the vein every 30 days Every 3 month dosing

## 2017-06-21 NOTE — PROGRESS NOTES
"Oncology Rooming Note    June 21, 2017 9:47 AM   Yas Arreola is a 84 year old female who presents for:    Chief Complaint   Patient presents with     Oncology Clinic Visit     Initial Vitals: /83  Pulse 75  Temp 98  F (36.7  C) (Oral)  Resp 18  Ht 1.676 m (5' 5.98\")  Wt 63.5 kg (140 lb)  SpO2 98%  BMI 22.61 kg/m2 Estimated body mass index is 22.61 kg/(m^2) as calculated from the following:    Height as of this encounter: 1.676 m (5' 5.98\").    Weight as of this encounter: 63.5 kg (140 lb). Body surface area is 1.72 meters squared.  No Pain (0) Comment: Data Unavailable   No LMP recorded. Patient is postmenopausal.  Allergies reviewed: Yes  Medications reviewed: Yes    Medications: OXYCODONE REFILL NEEDED TODAY  Pharmacy name entered into [employer]:    [facility] [address] [place] [address] [address] [facility] [address] [facility] [address]  [facility] [place], [place] - [address]    Clinical concerns: None     5 minutes for nursing intake (face to face time)     Shantel Avila [facility]        DISCHARGE PLAN:    Continue chemo- Reported to maria antonia, RN infusion    Left at  for them to arrange and given to yas:    6/28/17: Labs, velcade and darzolex  7/5/17: labs velcade and darzolex  7/12/17: labs, velcade darzolex  7/19/17: labs, velcade and darzolex.. And Dr. Roberts      Next appointments: See patient instruction section  Departure Mode: Ambulatory  Accompanied by: self  10 minutes for nursing discharge (face to face time)   Tameka Daigle RN          "

## 2017-06-22 ENCOUNTER — ANTICOAGULATION THERAPY VISIT (OUTPATIENT)
Dept: NURSING | Facility: CLINIC | Age: 82
End: 2017-06-22
Payer: COMMERCIAL

## 2017-06-22 DIAGNOSIS — Z79.01 LONG-TERM (CURRENT) USE OF ANTICOAGULANTS: ICD-10-CM

## 2017-06-22 LAB — INR POINT OF CARE: 3.4 (ref 0.86–1.14)

## 2017-06-22 PROCEDURE — 85610 PROTHROMBIN TIME: CPT | Mod: QW

## 2017-06-22 PROCEDURE — 36416 COLLJ CAPILLARY BLOOD SPEC: CPT

## 2017-06-22 NOTE — PROGRESS NOTES
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     1.  She had work-up for thrombocytopenia.       - On 09/21/2015, WBC of 4.2, hemoglobin of 13.2 and platelets of 138. CMP normal except mildly low protein of 6.4.   -On 09/29/2017, SPEP does not reveal any M-spike.   - On 10/02/2015, JANET does not reveal any monoclonal protein.     - On 10/22/2015, urine immunofixation reveals monoclonal free kappa light chain.    2. On 05/11/2016, kappa light chain of 50, lambda light chain of 0.32 and ratio of kappa to lambda of 156.2.  3. Skeletal survey on 05/23/2016 does not reveal any lytic lesion.    4. Bone marrow biopsy on 05/25/2016 reveals 40-50% kappa light chain restricted plasma cells.  Cytogenetics is normal. 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    5. MRI of bones on 06/21/2016 and 06/22/2016 reveals myeloma lesions.  6. On 08/24/2016, she was started on revlimid 25 mg 3 weeks on and 1 week off along with dexamethasone 20 mg weekly. Due to cytopenia, dose was subsequently reduced to 15 mg a day. Treatment in between had to be delayed because of cytopenia. She did not have any significant response to treatment.   7.Velcade and dexamethasone started on 03/21/2017.    8. On 03/21/2017, kappa free light chain was 52.5.  It decreased to 41.75 on 04/18/2017.  It has increased to 60.75 on 05/16/2017.    9. Daratumumab added on 05/31/2017.     SUBJECTIVE:  Ms. Amira Arreola is an 84-year-old female with kappa free light chain multiple myeloma.  She is on treatment with Velcade, daratumumab and dexamethasone since 05/31/2017.  Overall she is tolerating it well.  She has some fatigue.  She also has some pain in the back.  It is more in the mid upper back.  She takes about 3 oxycodone a day.  It helps.  She wants a refill.      No headache.  No  dizziness.  No chest pain.  No shortness of breath.  No nausea or vomiting.  Appetite has been good.  No fever or chills.  No urinary complaints. She has constipation. Gait has been fairly stable. She has not fallen down.      PHYSICAL EXAMINATION:   GENERAL:  She is alert and oriented x3.   EYES:  No icterus.   THROAT:  No ulcer or thrush.   NECK:  Supple. No lymphadenopathy or thyromegaly.   AXILLAE:  No lymphadenopathy.   LUNGS:  Good air entry bilaterally.  No crackles or wheezing.   HEART:  Regular.  No murmur.   ABDOMEN:  Soft and nontender.  No mass.   EXTREMITIES: Mild ankle edema. No calf swelling or tenderness.   SKIN:  No rash.       LABORATORY DATA:  Reviewed.      ASSESSMENT:   1.  An 84-year-old female with kappa free light chain multiple myeloma on daratumumab, Velcade and dexamethasone. She is tolerating it well.   2.  Back pain from myeloma lesion.   3.  Anemia and thrombocytopenia from myeloma.      PLAN:   1.  The patient overall is doing well from myeloma.  She is tolerating treatment well.  She will continue on Velcade, daratumumab and dexamethasone.  No significant side effects.  I asked about neuropathy.  She does not have neuropathic symptoms.   2.  She will continue on Zometa.  She is not having any dental or jaw related problem.   3.  For pain, she will continue on oxycodone.  She uses about 3 a day.   4.  She had a few questions, which were all answered.  I will see her in about a month.  Next week she will have kappa and lambda free light chain checked.  Advised her to call us if she has fever, chills, infection, worsening weakness, shortness of breath or any other concerns.         ITZ LOPEZ MD             D: 2017 11:42   T: 2017 07:53   MT: SK      Name:     ALBERTO PARSON   MRN:      [id number]        Account:      [id number]   :      1932           Visit Date:   2017      Document: [id number]

## 2017-06-22 NOTE — PROGRESS NOTES
ANTICOAGULATION FOLLOW-UP CLINIC VISIT    Patient Name:  Amira Arreola  Date:  6/22/2017  Contact Type:  Face to Face    SUBJECTIVE:     Patient Findings     Positives Extra doses    Comments Extra dose last week            OBJECTIVE    INR Protime   Date Value Ref Range Status   06/22/2017 3.4 (A) 0.86 - 1.14 Final       ASSESSMENT / PLAN  INR assessment SUPRA    Recheck INR In: 2 WEEKS    INR Location Clinic      Anticoagulation Summary as of 6/22/2017     INR goal 2.0-3.0   Today's INR 3.4!   Maintenance plan 6 mg (4 mg x 1.5) on Mon, Wed, Fri; 4 mg (4 mg x 1) all other days   Full instructions 6 mg on Mon, Wed, Fri; 4 mg all other days   Weekly total 34 mg   No change documented Dayana Barrera RN   Plan last modified Dayana Barrera RN (3/10/2017)   Next INR check 7/6/2017   Target end date Indefinite    Indications   Long-term (current) use of anticoagulants [Z79.01] [Z79.01]  Atrial fibrillation (H) [I48.91] (Resolved) [I48.91]         Anticoagulation Episode Summary     INR check location     Preferred lab     Send INR reminders to  [facility]    Comments       Anticoagulation Care Providers     Provider Role Specialty Phone number    Addy Frias MD [facility] Medicine [phone]            See the Encounter Report to view Anticoagulation Flowsheet and Dosing Calendar (Go to Encounters tab in chart review, and find the Anticoagulation Therapy Visit)    Dosage adjustment made based on physician directed care plan.    INR 3.4 today.  Resume 6 mg on Mon, Wed, Fri;  4 mg all other days = 34 mg weekly.  Recheck in 2 weeks.     Dayana Barrera RN

## 2017-06-22 NOTE — MR AVS SNAPSHOT
Amira Arreola   6/22/2017 10:00 AM   Anticoagulation Therapy Visit    Description:  84 year old female   Provider:   ANTICOAGULATION CLINIC   Department:  Ec Nurse           INR as of 6/22/2017     Today's INR 3.4!      Anticoagulation Summary as of 6/22/2017     INR goal 2.0-3.0   Today's INR 3.4!   Full instructions 6 mg on Mon, Wed, Fri; 4 mg all other days   Next INR check 7/6/2017    Indications   Long-term (current) use of anticoagulants [Z79.01] [Z79.01]  Atrial fibrillation (H) [I48.91] (Resolved) [I48.91]         Description     INR 3.4 today.  Resume 6 mg on Mon, Wed, Fri;  4 mg all other days = 34 mg weekly.  Recheck in 2 weeks.         Your next Anticoagulation Clinic appointment(s)     Jul 06, 2017  9:30 AM CDT   Anticoagulation Visit with  ANTICOAGULATION CLINIC   [facility] ([address] [id number] [phone]              Contact Numbers     Clinic Number:         June 2017 Details    Sun Mon Tue Wed Thu Fri Sat         1               2               3                 4               5               6               7               8               9               10                 11               12               13               14               15               16               17                 18               19               20               21               22      4 mg   See details      23      6 mg         24      4 mg           25      4 mg         26      6 mg         27      4 mg         28      6 mg         29      4 mg         30      6 mg           Date Details   06/22 This INR check               How to take your warfarin dose     To take:  4 mg Take 1 of the 4 mg tablets.    To take:  6 mg Take 1.5 of the 4 mg tablets.           July 2017 Details    Sun Mon Tue Wed Thu Fri Sat           1      4 mg           2      4 mg         3      6 mg         4      4 mg         5      6 mg          6            7               8                 9               10               11               12               13               14               15                 16               17               18               19               20               21               22                 23               24               25               26               27               28               29                 30               31                     Date Details   No additional details    Date of next INR:  7/6/2017         How to take your warfarin dose     To take:  4 mg Take 1 of the 4 mg tablets.    To take:  6 mg Take 1.5 of the 4 mg tablets.

## 2017-06-23 ENCOUNTER — OFFICE VISIT (OUTPATIENT)
Dept: CARDIOLOGY | Facility: CLINIC | Age: 82
End: 2017-06-23
Attending: INTERNAL MEDICINE
Payer: COMMERCIAL

## 2017-06-23 VITALS
SYSTOLIC BLOOD PRESSURE: 118 MMHG | HEART RATE: 84 BPM | HEIGHT: 66 IN | BODY MASS INDEX: 22.82 KG/M2 | DIASTOLIC BLOOD PRESSURE: 82 MMHG | WEIGHT: 142 LBS

## 2017-06-23 DIAGNOSIS — I48.0 PAROXYSMAL ATRIAL FIBRILLATION (H): ICD-10-CM

## 2017-06-23 PROCEDURE — 93000 ELECTROCARDIOGRAM COMPLETE: CPT | Performed by: INTERNAL MEDICINE

## 2017-06-23 PROCEDURE — 99213 OFFICE O/P EST LOW 20 MIN: CPT | Mod: 25 | Performed by: INTERNAL MEDICINE

## 2017-06-23 NOTE — LETTER
6/23/2017    Addy Frias MD  [address]  [facility] [address]    RE: Amira IVY Maxwell       Dear Colleague,    I had the pleasure of seeing Amira Arreola in the [facility].    REASON FOR CLINIC VISIT:  Followup atrial fibrillation.      Ms. Arreola is a very pleasant 84-year-old female with paroxysmal atrial fibrillation, [id number] score of 4, multiple myeloma on chemotherapy, hypertension.  I saw the patient initially beginning of this month when she was recently diagnosed with atrial fibrillation while getting chemotherapy.  To be noted, she is essentially asymptomatic from atrial fibrillation.  After a long discussion, we decided rate control strategy and she increased the beta blocker dose from 50 mg b.i.d. to 75 and 50.  She also had an echocardiogram that showed normal LV function with EF of 55% to 60%, 1-2+ mitral regurgitation, mild to moderate biatrial enlargement, 1 to 2+ tricuspid regurgitation, pulmonary hypertension with RVSP of 36 mmHg plus RA, mild aortic root and ascending aorta dilatation measuring 3.8 cm.  Today, she is coming in routine followup.  The patient tells me overall she feels quite okay.  She regularly takes her dog for a walk.  She does household chores and activities of daily living without feeling any shortness of breath, chest discomfort.  She does get tired, which is longstanding.  She also has some mild anemia.  No bleeding issues with Coumadin.  EKG confirmed that she is in atrial fibrillation, ventricular rates in low 90s.  As noted above, she is essentially asymptomatic from atrial fibrillation.      PHYSICAL EXAMINATION:   VITAL SIGNS:  Blood pressure 118/82, heart rate 84 irregular, weight 142 pounds, BMI 22.97.   GENERAL:  The patient appears pleasant, comfortable.   NECK:  JVP slightly elevated to around 8 cm.   CARDIOVASCULAR SYSTEM:  Irregular, normal rate, no murmur, rub or gallop.   RESPIRATORY SYSTEM:  Clear to auscultation  bilaterally.   ABDOMEN:  Soft, nontender.   EXTREMITIES:  1+ pitting pedal edema.   NEUROLOGICAL:  Alert, oriented x3.   PSYCHIATRIC:  Normal affect.   SKIN:  No obvious rash.   HEENT:  Mild pallor.     Outpatient Encounter Prescriptions as of 6/23/2017   Medication Sig Dispense Refill     [DISCONTINUED] oxyCODONE (ROXICODONE) 15 MG IR tablet Take 1 tablet (15 mg) by mouth every 8 hours as needed for pain maximum 4 tablet(s) per day 90 tablet 0     lidocaine-prilocaine (EMLA) cream Apply topically as needed for moderate pain Apply dollop size amount to port site 30-60 min prior to accessing 30 g 1     [DISCONTINUED] dexamethasone (DECADRON) 4 MG tablet Take 20mg (5 tabs) by mouth every week the morning of velcade injection. Then take 1 tab (4mg) by mouth for two days after darzalex infusion. 28 tablet 0     LORazepam (ATIVAN) 0.5 MG tablet Take 1 tablet (0.5 mg) by mouth every 8 hours as needed for anxiety 20 tablet 3     [DISCONTINUED] warfarin (COUMADIN) 4 MG tablet Take 6 mg on Mon, Wed, Fri; 4 mg all other days or as directed by INR clinic. 120 tablet 0     Acetaminophen (TYLENOL PO) Take 500 mg by mouth every 6 hours as needed for mild pain or fever       acyclovir (ZOVIRAX) 400 MG tablet Take 1 tablet (400 mg) by mouth 2 times daily Viral Prophylaxis. 60 tablet 11     Zoledronic Acid (ZOMETA IV) Inject into the vein every 30 days Every 3 month dosing       [DISCONTINUED] metoprolol (TOPROL-XL) 50 MG 24 hr tablet Take 1 tablet (50 mg) by mouth 2 times daily 180 tablet 3     Desloratadine (CLARINEX PO) Take by mouth daily Taking claritin       Prochlorperazine Maleate (COMPAZINE PO) Take 10 mg by mouth daily as needed        polyethylene glycol (MIRALAX/GLYCOLAX) powder Take 1 capful by mouth daily as needed        UNABLE TO FIND MEDICATION NAME: Fresh Coat eye drops       timolol (TIMOPTIC) 0.25 % ophthalmic solution Place 1 drop into the right eye 2 times daily        carboxymethylcellulose (REFRESH PLUS) 0.5  % SOLN 1 drop 4 times daily       Cholecalciferol (VITAMIN D3 PO) Take 1,000 Units by mouth daily       Calcium Citrate-Vitamin D (CALCIUM CITRATE + PO) Take 2,000 mg by mouth daily 2 tabs       erythromycin (ROMYCIN) ophthalmic ointment Place 1 Application into both eyes At Bedtime        cycloSPORINE (RESTASIS) 0.05 % ophthalmic emulsion Place 1 drop into both eyes every 12 hours        Docusate Sodium (GENTLE STOOL SOFTENER PO) Take 100 mg by mouth daily        Multiple Vitamin (DAILY MULTIVITAMIN PO) Take 1 tablet by mouth daily.       [DISCONTINUED] furosemide (LASIX) 20 MG tablet Take 1 tablet (20 mg) by mouth daily (Patient not taking: Reported on 6/21/2017) 90 tablet 3     ASPIRIN NOT PRESCRIBED (INTENTIONAL) Antiplatelet medication not prescribed intentionally due to Current anticoagulant therapy (warfarin/enoxaparin) 0 each 0     No facility-administered encounter medications on file as of 6/23/2017.       IMPRESSION AND PLAN:  A very delightful 84-year-old female with paroxysmal atrial fibrillation, essentially asymptomatic from it, [id number] score of 4, on Coumadin for stroke prophylaxis.  Overall, cardiovascular status-wise, she is doing reasonably well.  She is asymptomatic from atrial fibrillation.  She does have evidence of volume overload and last clinic visit, I recommend a trial of low-dose diuretic, Lasix 20 mg daily.  The patient tells me that she tried it for 1 day and she urinated a lot and did not feel well and discontinued it.  Remarkably, her weight is less today compared to last clinic visit and to be noted, she is on regular steroids and gets IV fluids with her chemo cycles.  At this time, due to patient's preference, we would not use any diuretic but in case if she noticed weight gain or pedal edema or any shortness of breath, I think this may need to be revisited.  I am setting up a tentative followup in about 3 months.  In the interim, if she notices any exertional symptoms or change  in clinical status, she should call our clinic and we would be happy to see her sooner.     Again, thank you for allowing me to participate in the care of your patient.      Sincerely,    Ramos Rivera MD     [facility]

## 2017-06-23 NOTE — PROGRESS NOTES
HPI and Plan:   See dictation(#[id number])    Orders Placed This Encounter   Procedures     Follow-Up with Cardiologist     EKG 12-lead complete w/read - Clinics (performed today)       No orders of the defined types were placed in this encounter.      Medications Discontinued During This Encounter   Medication Reason     furosemide (LASIX) 20 MG tablet Stopped by Patient         Encounter Diagnosis   Name Primary?     Paroxysmal atrial fibrillation (H)        CURRENT MEDICATIONS:  Current Outpatient Prescriptions   Medication Sig Dispense Refill     oxyCODONE (ROXICODONE) 15 MG IR tablet Take 1 tablet (15 mg) by mouth every 8 hours as needed for pain maximum 4 tablet(s) per day 90 tablet 0     dexamethasone (DECADRON) 4 MG tablet Take 20mg (5 tabs) by mouth every week the morning of velcade injection. Then take 1 tab (4mg) by mouth for two days after darzalex infusion. 28 tablet 0     lidocaine-prilocaine (EMLA) cream Apply topically as needed for moderate pain Apply dollop size amount to port site 30-60 min prior to accessing 30 g 1     LORazepam (ATIVAN) 0.5 MG tablet Take 1 tablet (0.5 mg) by mouth every 8 hours as needed for anxiety 20 tablet 3     warfarin (COUMADIN) 4 MG tablet Take 6 mg on Mon, Wed, Fri; 4 mg all other days or as directed by INR clinic. 120 tablet 0     Acetaminophen (TYLENOL PO) Take 500 mg by mouth every 6 hours as needed for mild pain or fever       acyclovir (ZOVIRAX) 400 MG tablet Take 1 tablet (400 mg) by mouth 2 times daily Viral Prophylaxis. 60 tablet 11     Zoledronic Acid (ZOMETA IV) Inject into the vein every 30 days Every 3 month dosing       metoprolol (TOPROL-XL) 50 MG 24 hr tablet Take 1 tablet (50 mg) by mouth 2 times daily 180 tablet 3     Desloratadine (CLARINEX PO) Take by mouth daily Taking claritin       Prochlorperazine Maleate (COMPAZINE PO) Take 10 mg by mouth daily as needed        polyethylene glycol (MIRALAX/GLYCOLAX) powder Take 1 capful by mouth daily as needed         UNABLE TO FIND MEDICATION NAME: Fresh Coat eye drops       timolol (TIMOPTIC) 0.25 % ophthalmic solution 1 drop every morning       carboxymethylcellulose (REFRESH PLUS) 0.5 % SOLN 1 drop 4 times daily       Cholecalciferol (VITAMIN D3 PO) Take 1,000 Units by mouth daily       Calcium Citrate-Vitamin D (CALCIUM CITRATE + PO) Take 2,000 mg by mouth daily 2 tabs       erythromycin (ROMYCIN) ophthalmic ointment Place 1 Application into both eyes At Bedtime        cycloSPORINE (RESTASIS) 0.05 % ophthalmic emulsion Place 1 drop into both eyes every 12 hours        Docusate Sodium (GENTLE STOOL SOFTENER PO) Take 100 mg by mouth daily        Multiple Vitamin (DAILY MULTIVITAMIN PO) Take 1 tablet by mouth daily.       ASPIRIN NOT PRESCRIBED (INTENTIONAL) Antiplatelet medication not prescribed intentionally due to Current anticoagulant therapy (warfarin/enoxaparin) (Patient not taking: Reported on 6/23/2017) 0 each 0       ALLERGIES     Allergies   Allergen Reactions     Penicillin [Penicillins] Rash     Blotches on chest        PAST MEDICAL HISTORY:  Past Medical History:   Diagnosis Date     Ascending aorta dilatation (H) 4/16    on echo, mild     Cancer, metastatic to bone (H)     due to myeloma     Colonic polyp 2008    adenomatous, fu 2013 tics only     Compression fracture 2016    multiple areas of spine     Dry eyes      Elevated MCV 2015    b12 and folic acid nl     HTN (hypertension) 2000    off meds for years     Menorrhagia 2002    hysteroscopy and d and c done     MGUS (monoclonal gammopathy of unknown significance) 2015    eval by Dr. Roberts     Multiple myeloma (H) 2016    dx 5/16 at [facility], bone lesions seen on mri 6/16     OAB (overactive bladder) 2013    Dr. Grullon     Osteoporosis     fu done 2010 and stable, went off meds then, fu done 2013; has had gyn fu and added evista 2013 by gyn     Palpitations 4/16    nl echo, mildly dilated asc aorta     Paroxysmal atrial fibrillation (H) 4/16    had palp and  ziopatch showed it, echo nl lv fxn, mild mr and tr, added coum and toprol, toprol dose raised 16     Sciatica of left side     Dr. Helen Ricardo      SVT (supraventricular tachycardia) (H)     on ziopatch     Thrombocytopenia (H)        PAST SURGICAL HISTORY:  Past Surgical History:   Procedure Laterality Date     BONE MARROW BIOPSY, BONE SPECIMEN, NEEDLE/TROCAR N/A 2016    Procedure: BIOPSY BONE MARROW;  Surgeon: Bryan Patel MD;  Location:  GI     CATARACT IOL, RT/LT        SECTION  ,      COLONOSCOPY  2013    Procedure: COLONOSCOPY;  COLONOSCOPY;  Surgeon: Steffany Rockwell MD;  Location:  GI     EXCISE EXOSTOSIS TIBIA / FIBULA  2014    Procedure: EXCISE EXOSTOSIS TIBIA / FIBULA;  Surgeon: Naila Pichardo MD;  Location:  [facility]     hysteroscopy and d and c      due to bleeding     left anle replacement       right ankle surgery         FAMILY HISTORY:  Family History   Problem Relation Age of Onset     HEART DISEASE Father      C.A.D. Mother      CEREBROVASCULAR DISEASE Brother      Family History Negative Sister      Family History Negative Sister      Family History Negative Brother        SOCIAL HISTORY:  Social History     Social History     Marital status:      Spouse name: Tom     Number of children: 6     Years of education: N/A     Occupational History     rn anesthetist Retired     Social History Main Topics     Smoking status: Never Smoker     Smokeless tobacco: Never Used     Alcohol use No     Drug use: No     Sexual activity: No     Other Topics Concern     None     Social History Narrative       Review of Systems:  Skin:  Positive for bruising     Eyes:  Positive for glasses vision changes has new eyeglasses  ENT:  Negative      Respiratory:  Negative       Cardiovascular:  Negative;palpitations;chest pain;syncope or near-syncope;cyanosis Positive for;fatigue;dizziness;edema;exercise  "intolerance    Gastroenterology: Negative      Genitourinary:  Positive for urinary frequency    Musculoskeletal:  Negative      Neurologic:  Negative      Psychiatric:  Negative      Heme/Lymph/Imm:  Negative      Endocrine:  Negative        Physical Exam:  Vitals: /82 (BP Location: Left arm, Cuff Size: Adult Regular)  Pulse 84  Ht 1.676 m (5' 6\")  Wt 64.4 kg (142 lb)  BMI 22.92 kg/m2    Constitutional:           Skin:           Head:           Eyes:           ENT:           Neck:           Chest:             Cardiac:                    Abdomen:           Vascular:                                          Extremities and Back:                 Neurological:                 CC  Ramos Rivera MD   [facility]  [address]  [place] [address]                  "

## 2017-06-23 NOTE — PROGRESS NOTES
REASON FOR CLINIC VISIT:  Followup atrial fibrillation.      HISTORY OF PRESENT ILLNESS:  Ms. Arreola is a very pleasant 84-year-old female with paroxysmal atrial fibrillation, [id number]-QSLq score of 4, multiple myeloma on chemotherapy, hypertension.  I saw the patient initially beginning of this month when she was recently diagnosed with atrial fibrillation while getting chemotherapy.  To be noted, she is essentially asymptomatic from atrial fibrillation.  After a long discussion, we decided rate control strategy and she increased the beta blocker dose from 50 mg b.i.d. to 75 and 50.  She also had an echocardiogram that showed normal LV function with EF of 55% to 60%, 1-2+ mitral regurgitation, mild to moderate biatrial enlargement, 1 to 2+ tricuspid regurgitation, pulmonary hypertension with RVSP of 36 mmHg plus RA, mild aortic root and ascending aorta dilatation measuring 3.8 cm.  Today, she is coming in routine followup.  The patient tells me overall she feels quite okay.  She regularly takes her dog for a walk.  She does household chores and activities of daily living without feeling any shortness of breath, chest discomfort.  She does get tired, which is longstanding.  She also has some mild anemia.  No bleeding issues with Coumadin.  EKG confirmed that she is in atrial fibrillation, ventricular rates in low 90s.  As noted above, she is essentially asymptomatic from atrial fibrillation.      PHYSICAL EXAMINATION:   VITAL SIGNS:  Blood pressure 118/82, heart rate 84 irregular, weight 142 pounds, BMI 22.97.   GENERAL:  The patient appears pleasant, comfortable.   NECK:  JVP slightly elevated to around 8 cm.   CARDIOVASCULAR SYSTEM:  Irregular, normal rate, no murmur, rub or gallop.   RESPIRATORY SYSTEM:  Clear to auscultation bilaterally.   ABDOMEN:  Soft, nontender.   EXTREMITIES:  1+ pitting pedal edema.   NEUROLOGICAL:  Alert, oriented x3.   PSYCHIATRIC:  Normal affect.   SKIN:  No obvious rash.   HEENT:  Mild  pallor.      IMPRESSION AND PLAN:  A very delightful 84-year-old female with paroxysmal atrial fibrillation, essentially asymptomatic from it, [id number] score of 4, on Coumadin for stroke prophylaxis.  Overall, cardiovascular status-wise, she is doing reasonably well.  She is asymptomatic from atrial fibrillation.  She does have evidence of volume overload and last clinic visit, I recommend a trial of low-dose diuretic, Lasix 20 mg daily.  The patient tells me that she tried it for 1 day and she urinated a lot and did not feel well and discontinued it.  Remarkably, her weight is less today compared to last clinic visit and to be noted, she is on regular steroids and gets IV fluids with her chemo cycles.  At this time, due to patient's preference, we would not use any diuretic but in case if she noticed weight gain or pedal edema or any shortness of breath, I think this may need to be revisited.  I am setting up a tentative followup in about 3 months.  In the interim, if she notices any exertional symptoms or change in clinical status, she should call our clinic and we would be happy to see her sooner.         MELISSA MONSIVAIS MD             D: 2017 09:34   T: 2017 16:02   MT: [id number]      Name:     ALBERTO PARSON   MRN:      [id number]        Account:      [id number]   :      1932           Service Date: 2017      Document: [id number]

## 2017-06-23 NOTE — MR AVS SNAPSHOT
After Visit Summary   6/23/2017    Amira Arreola    MRN: [id number]           Patient Information     Date Of Birth          7/17/1932        Visit Information        Provider Department      6/23/2017 9:15 AM Ramos Rivera MD [facility] [place]        Today's Diagnoses     Paroxysmal atrial fibrillation (H)           Follow-ups after your visit        Additional Services     Follow-Up with Cardiologist                 Your next 10 appointments already scheduled     Jun 28, 2017  9:00 AM CDT   Level 7 with SH INFUSION CHAIR 5   [facility] ([facility])    [facility]  [address]  [place] [address]   [phone]            Jul 05, 2017  9:30 AM CDT   Level 7 with SH INFUSION CHAIR 11   [facility] ([facility])    [facility]  [address]  [place] [address]   [phone]            Jul 07, 2017 10:30 AM CDT   Anticoagulation Visit with [facility] ANTICOAGULATION CLINIC   [address] [address]   [phone]            Jul 12, 2017  8:30 AM CDT   Level 7 with  INFUSION CHAIR 9   [facility] ([facility])    [facility]  [address]  [place] [address]   [phone]            Jul 19, 2017  9:00 AM CDT   Level 7 with SH INFUSION CHAIR 13   [facility] ([facility])    [facility]  [address]  [place] [address]   [phone]            Jul 19, 2017  1:00 PM CDT   Return Visit with Shayne Roberts MD   [facility] ([facility])    [facility]  [address]  [place] [address]  "  [phone]              Future tests that were ordered for you today     Open Future Orders        Priority Expected Expires Ordered    Follow-Up with Cardiologist Routine 2017            Who to contact     If you have questions or need follow up information about today's clinic visit or your schedule please contact [facility] AT [place] directly at [phone].  Normal or non-critical lab and imaging results will be communicated to you by Pop.ithart, letter or phone within 4 business days after the clinic has received the results. If you do not hear from us within 7 days, please contact the clinic through Pop.ithart or phone. If you have a critical or abnormal lab result, we will notify you by phone as soon as possible.  Submit refill requests through E-Duction or call your pharmacy and they will forward the refill request to us. Please allow 3 business days for your refill to be completed.          Additional Information About Your Visit        Pop.itharretsCloud Information     E-Duction lets you send messages to your doctor, view your test results, renew your prescriptions, schedule appointments and more. To sign up, go to [email or web address] . Click on \"Log in\" on the left side of the screen, which will take you to the Welcome page. Then click on \"Sign up Now\" on the right side of the page.     You will be asked to enter the access code listed below, as well as some personal information. Please follow the directions to create your username and password.     Your access code is: [email or web address]  Expires: 2017 11:09 AM     Your access code will  in 90 days. If you need help or a new code, please call your [place] clinic or [phone].        Care EveryWhere ID     This is your Care EveryWhere ID. This could be used by other organizations to access your [place] medical records  [id number]        Your Vitals Were     Pulse Height BMI (Body Mass Index)       " "      84 1.676 m (5' 6\") 22.92 kg/m2          Blood Pressure from Last 3 Encounters:   06/23/17 118/82   06/21/17 144/83   06/21/17 142/78    Weight from Last 3 Encounters:   06/23/17 64.4 kg (142 lb)   06/21/17 63.5 kg (140 lb)   06/21/17 63.5 kg (140 lb)              We Performed the Following     EKG 12-lead complete w/read - Clinics (performed today)     Follow-Up with Cardiologist        Primary Care Provider Office Phone # Fax #    Addy Frias [phone]       [facility] [address] [facility] [address]  [place] [address]        Equal Access to Services     SARA ZELAYA : Hadii liane murcia hadasho Soomaali, waaxda luqadaha, qaybta kaalmada adeegyada, hung lorain osiel dominique . So [facility] [phone].    ATENCIÓN: Si habla español, tiene a barlow disposición servicios gratuitos de asistencia lingüística. Llame al [phone].    We comply with applicable federal civil rights laws and [place] laws. We do not discriminate on the basis of race, color, national origin, age, disability sex, sexual orientation or gender identity.            Thank you!     Thank you for choosing [facility] AT [place]  for your care. Our goal is always to provide you with excellent care. Hearing back from our patients is one way we can continue to improve our services. Please take a few minutes to complete the written survey that you may receive in the mail after your visit with us. Thank you!             Your Updated Medication List - Protect others around you: Learn how to safely use, store and throw away your medicines at www.disposemymeds.org.          This list is accurate as of: 6/23/17  9:27 AM.  Always use your most recent med list.                   Brand Name Dispense Instructions for use Diagnosis    acyclovir 400 MG tablet    ZOVIRAX    60 tablet    Take 1 tablet (400 mg) by mouth 2 times daily Viral Prophylaxis.    Multiple myeloma not having achieved " remission (H)       ASPIRIN NOT PRESCRIBED    INTENTIONAL    0 each    Antiplatelet medication not prescribed intentionally due to Current anticoagulant therapy (warfarin/enoxaparin)    Paroxysmal atrial fibrillation (H)       CALCIUM CITRATE + PO      Take 2,000 mg by mouth daily 2 tabs        carboxymethylcellulose 0.5 % Soln ophthalmic solution    REFRESH PLUS     1 drop 4 times daily        CLARINEX PO      Take by mouth daily Taking claritin        COMPAZINE PO      Take 10 mg by mouth daily as needed        cycloSPORINE 0.05 % ophthalmic emulsion    RESTASIS     Place 1 drop into both eyes every 12 hours        DAILY MULTIVITAMIN PO      Take 1 tablet by mouth daily.    Routine general medical examination at a health care facility       dexamethasone 4 MG tablet    DECADRON    28 tablet    Take 20mg (5 tabs) by mouth every week the morning of velcade injection. Then take 1 tab (4mg) by mouth for two days after darzalex infusion.    Multiple myeloma not having achieved remission (H)       erythromycin ophthalmic ointment    ROMYCIN     Place 1 Application into both eyes At Bedtime        GENTLE STOOL SOFTENER PO      Take 100 mg by mouth daily        lidocaine-prilocaine cream    EMLA    30 g    Apply topically as needed for moderate pain Apply dollop size amount to port site 30-60 min prior to accessing    Multiple myeloma not having achieved remission (H)       LORazepam 0.5 MG tablet    ATIVAN    20 tablet    Take 1 tablet (0.5 mg) by mouth every 8 hours as needed for anxiety    Multiple myeloma not having achieved remission (H)       metoprolol 50 MG 24 hr tablet    TOPROL-XL    180 tablet    Take 1 tablet (50 mg) by mouth 2 times daily    SVT (supraventricular tachycardia) (H), Paroxysmal atrial fibrillation (H)       oxyCODONE 15 MG IR tablet    ROXICODONE    90 tablet    Take 1 tablet (15 mg) by mouth every 8 hours as needed for pain maximum 4 tablet(s) per day    Multiple myeloma not having achieved  remission (H)       polyethylene glycol powder    MIRALAX/GLYCOLAX     Take 1 capful by mouth daily as needed    Bilateral leg edema       timolol 0.25 % ophthalmic solution    TIMOPTIC     1 drop every morning        TYLENOL PO      Take 500 mg by mouth every 6 hours as needed for mild pain or fever        UNABLE TO FIND      MEDICATION NAME: Fresh Coat eye drops        VITAMIN D3 PO      Take 1,000 Units by mouth daily        warfarin 4 MG tablet    COUMADIN    120 tablet    Take 6 mg on Mon, Wed, Fri; 4 mg all other days or as directed by INR clinic.    Paroxysmal atrial fibrillation (H), Long-term (current) use of anticoagulants       ZOMETA IV      Inject into the vein every 30 days Every 3 month dosing

## 2017-06-28 ENCOUNTER — INFUSION THERAPY VISIT (OUTPATIENT)
Dept: INFUSION THERAPY | Facility: CLINIC | Age: 82
End: 2017-06-28
Attending: INTERNAL MEDICINE
Payer: MEDICARE

## 2017-06-28 ENCOUNTER — HOSPITAL ENCOUNTER (OUTPATIENT)
Facility: CLINIC | Age: 82
Setting detail: SPECIMEN
Discharge: HOME | End: 2017-06-28
Attending: INTERNAL MEDICINE | Admitting: INTERNAL MEDICINE
Payer: MEDICARE

## 2017-06-28 VITALS
HEART RATE: 70 BPM | HEIGHT: 66 IN | OXYGEN SATURATION: 98 % | TEMPERATURE: 98.2 F | RESPIRATION RATE: 16 BRPM | DIASTOLIC BLOOD PRESSURE: 63 MMHG | BODY MASS INDEX: 22.05 KG/M2 | WEIGHT: 137.2 LBS | SYSTOLIC BLOOD PRESSURE: 128 MMHG

## 2017-06-28 DIAGNOSIS — C90.00 MULTIPLE MYELOMA NOT HAVING ACHIEVED REMISSION (H): Primary | ICD-10-CM

## 2017-06-28 DIAGNOSIS — C90.01 MULTIPLE MYELOMA IN REMISSION (H): ICD-10-CM

## 2017-06-28 LAB
ALBUMIN SERPL-MCNC: 3.4 G/DL (ref 3.4–5)
ALP SERPL-CCNC: 48 U/L (ref 40–150)
ALT SERPL W P-5'-P-CCNC: 37 U/L (ref 0–50)
AST SERPL W P-5'-P-CCNC: 22 U/L (ref 0–45)
BASOPHILS # BLD AUTO: 0 10E9/L (ref 0–0.2)
BASOPHILS NFR BLD AUTO: 0 %
BILIRUB DIRECT SERPL-MCNC: 0.2 MG/DL (ref 0–0.2)
BILIRUB SERPL-MCNC: 0.7 MG/DL (ref 0.2–1.3)
CREAT SERPL-MCNC: 0.56 MG/DL (ref 0.52–1.04)
DIFFERENTIAL METHOD BLD: ABNORMAL
EOSINOPHIL # BLD AUTO: 0 10E9/L (ref 0–0.7)
EOSINOPHIL NFR BLD AUTO: 0 %
ERYTHROCYTE [DISTWIDTH] IN BLOOD BY AUTOMATED COUNT: 14.6 % (ref 10–15)
GFR SERPL CREATININE-BSD FRML MDRD: NORMAL ML/MIN/1.7M2
HCT VFR BLD AUTO: 35.2 % (ref 35–47)
HGB BLD-MCNC: 11.7 G/DL (ref 11.7–15.7)
IMM GRANULOCYTES # BLD: 0 10E9/L (ref 0–0.4)
IMM GRANULOCYTES NFR BLD: 0 %
KAPPA LC UR-MCNC: 6.43 MG/DL (ref 0.33–1.94)
KAPPA LC/LAMBDA SER: 13.12 {RATIO} (ref 0.26–1.65)
LAMBDA LC SERPL-MCNC: 0.49 MG/DL (ref 0.57–2.63)
LYMPHOCYTES # BLD AUTO: 0.2 10E9/L (ref 0.8–5.3)
LYMPHOCYTES NFR BLD AUTO: 4.3 %
MCH RBC QN AUTO: 33.6 PG (ref 26.5–33)
MCHC RBC AUTO-ENTMCNC: 33.2 G/DL (ref 31.5–36.5)
MCV RBC AUTO: 101 FL (ref 78–100)
MONOCYTES # BLD AUTO: 0.2 10E9/L (ref 0–1.3)
MONOCYTES NFR BLD AUTO: 3.4 %
NEUTROPHILS # BLD AUTO: 4.7 10E9/L (ref 1.6–8.3)
NEUTROPHILS NFR BLD AUTO: 92.3 %
PLATELET # BLD AUTO: 122 10E9/L (ref 150–450)
PROT SERPL-MCNC: 5.8 G/DL (ref 6.8–8.8)
RBC # BLD AUTO: 3.48 10E12/L (ref 3.8–5.2)
WBC # BLD AUTO: 5.1 10E9/L (ref 4–11)

## 2017-06-28 PROCEDURE — 83883 ASSAY NEPHELOMETRY NOT SPEC: CPT | Performed by: INTERNAL MEDICINE

## 2017-06-28 PROCEDURE — A9270 NON-COVERED ITEM OR SERVICE: HCPCS | Mod: GY | Performed by: INTERNAL MEDICINE

## 2017-06-28 PROCEDURE — 96401 CHEMO ANTI-NEOPL SQ/IM: CPT

## 2017-06-28 PROCEDURE — 96375 TX/PRO/DX INJ NEW DRUG ADDON: CPT

## 2017-06-28 PROCEDURE — 80076 HEPATIC FUNCTION PANEL: CPT | Performed by: INTERNAL MEDICINE

## 2017-06-28 PROCEDURE — 00000402 ZZHCL STATISTIC TOTAL PROTEIN: Performed by: INTERNAL MEDICINE

## 2017-06-28 PROCEDURE — 84165 PROTEIN E-PHORESIS SERUM: CPT | Performed by: INTERNAL MEDICINE

## 2017-06-28 PROCEDURE — 96415 CHEMO IV INFUSION ADDL HR: CPT

## 2017-06-28 PROCEDURE — 25000132 ZZH RX MED GY IP 250 OP 250 PS 637: Mod: GY | Performed by: INTERNAL MEDICINE

## 2017-06-28 PROCEDURE — 85025 COMPLETE CBC W/AUTO DIFF WBC: CPT | Performed by: INTERNAL MEDICINE

## 2017-06-28 PROCEDURE — 25000128 H RX IP 250 OP 636: Performed by: INTERNAL MEDICINE

## 2017-06-28 PROCEDURE — 82565 ASSAY OF CREATININE: CPT | Performed by: INTERNAL MEDICINE

## 2017-06-28 PROCEDURE — 96413 CHEMO IV INFUSION 1 HR: CPT

## 2017-06-28 RX ORDER — ACETAMINOPHEN 325 MG/1
650 TABLET ORAL ONCE
Status: COMPLETED | OUTPATIENT
Start: 2017-06-28 | End: 2017-06-28

## 2017-06-28 RX ORDER — DEXAMETHASONE 4 MG/1
TABLET ORAL
Qty: 28 TABLET | Refills: 0 | Status: SHIPPED | OUTPATIENT
Start: 2017-06-28 | End: 2017-07-26

## 2017-06-28 RX ORDER — DEXAMETHASONE 4 MG/1
TABLET ORAL
Qty: 28 TABLET | Refills: 0 | Status: SHIPPED | OUTPATIENT
Start: 2017-06-28 | End: 2017-06-28

## 2017-06-28 RX ORDER — HEPARIN SODIUM (PORCINE) LOCK FLUSH IV SOLN 100 UNIT/ML 100 UNIT/ML
5 SOLUTION INTRAVENOUS EVERY 8 HOURS
Status: DISCONTINUED | OUTPATIENT
Start: 2017-06-28 | End: 2017-06-28 | Stop reason: HOSPADM

## 2017-06-28 RX ORDER — DIPHENHYDRAMINE HCL 25 MG
50 CAPSULE ORAL ONCE
Status: COMPLETED | OUTPATIENT
Start: 2017-06-28 | End: 2017-06-28

## 2017-06-28 RX ADMIN — DIPHENHYDRAMINE HYDROCHLORIDE 50 MG: 25 CAPSULE ORAL at 10:04

## 2017-06-28 RX ADMIN — SODIUM CHLORIDE, PRESERVATIVE FREE 5 ML: 5 INJECTION INTRAVENOUS at 14:23

## 2017-06-28 RX ADMIN — DARATUMUMAB 1000 MG: 100 INJECTION, SOLUTION, CONCENTRATE INTRAVENOUS at 11:03

## 2017-06-28 RX ADMIN — BORTEZOMIB 2.2 MG: 3.5 INJECTION, POWDER, LYOPHILIZED, FOR SOLUTION INTRAVENOUS; SUBCUTANEOUS at 12:54

## 2017-06-28 RX ADMIN — ACETAMINOPHEN 650 MG: 325 TABLET ORAL at 10:05

## 2017-06-28 RX ADMIN — DEXAMETHASONE SODIUM PHOSPHATE 12 MG: 10 INJECTION, SOLUTION INTRAMUSCULAR; INTRAVENOUS at 10:27

## 2017-06-28 RX ADMIN — SODIUM CHLORIDE 250 ML: 9 INJECTION, SOLUTION INTRAVENOUS at 10:27

## 2017-06-28 ASSESSMENT — PAIN SCALES - GENERAL: PAINLEVEL: NO PAIN (0)

## 2017-06-28 NOTE — MR AVS SNAPSHOT
After Visit Summary   6/28/2017    Amira Arreola    MRN: [id number]           Patient Information     Date Of Birth          7/17/1932        Visit Information        Provider Department      6/28/2017 9:00 AM  INFUSION CHAIR 5 [facility]        Today's Diagnoses     Multiple myeloma not having achieved remission (H)    -  1    Multiple myeloma in remission (H)           Follow-ups after your visit        Your next 10 appointments already scheduled     Jul 05, 2017  9:30 AM CDT   Level 7 with  INFUSION CHAIR 11   [facility] ([facility])    [facility]  [address]  [place] [address]   [phone]            Jul 07, 2017 10:30 AM CDT   Anticoagulation Visit with EC ANTICOAGULATION CLINIC   [facility] ([address] [address]   [phone]            Jul 12, 2017  8:30 AM CDT   Level 7 with  INFUSION CHAIR 9   [facility] ([facility])    [facility]  [address]  [place] [address]   [phone]            Jul 19, 2017  9:00 AM CDT   Level 7 with  INFUSION CHAIR 13   [facility] ([facility])    [facility]  [address]  [place] [address]   [phone]            Jul 19, 2017  1:00 PM CDT   Return Visit with Shayne Roberts MD   [facility])    [facility]  [address]  [place] [address]   [phone]            Sep 21, 2017 10:45 AM CDT   Return Visit with Ramos Rivera MD   [facility] [place] ([facility])    [address]  [place] [address] [phone]              Who to contact     If  "you have questions or need follow up information about today's clinic visit or your schedule please contact [facility] directly at [phone].  Normal or non-critical lab and imaging results will be communicated to you by Application Expertshart, letter or phone within 4 business days after the clinic has received the results. If you do not hear from us within 7 days, please contact the clinic through Application Expertshart or phone. If you have a critical or abnormal lab result, we will notify you by phone as soon as possible.  Submit refill requests through Homecare Homebase or call your pharmacy and they will forward the refill request to us. Please allow 3 business days for your refill to be completed.          Additional Information About Your Visit        Homecare Homebase Information     Homecare Homebase lets you send messages to your doctor, view your test results, renew your prescriptions, schedule appointments and more. To sign up, go to [email or web address] . Click on \"Log in\" on the left side of the screen, which will take you to the Welcome page. Then click on \"Sign up Now\" on the right side of the page.     You will be asked to enter the access code listed below, as well as some personal information. Please follow the directions to create your username and password.     Your access code is: [email or web address]  Expires: 2017  2:33 PM     Your access code will  in 90 days. If you need help or a new code, please call your [place] clinic or [phone].        Care EveryWhere ID     This is your Care EveryWhere ID. This could be used by other organizations to access your [place] medical records  [id number]        Your Vitals Were     Pulse Temperature Respirations Height Pulse Oximetry BMI (Body Mass Index)    70 98.2  F (36.8  C) (Oral) 16 1.676 m (5' 5.98\") 98% 22.16 kg/m2       Blood Pressure from Last 3 Encounters:   17 128/63   17 118/82   17 144/83    Weight from Last 3 Encounters:   17 " 62.2 kg (137 lb 3.2 oz)   06/23/17 64.4 kg (142 lb)   06/21/17 63.5 kg (140 lb)              We Performed the Following     CBC with platelets differential     Creatinine     Hepatic panel     Kappa and lambda light chain     Protein electrophoresis          Today's Medication Changes          These changes are accurate as of: 6/28/17  2:33 PM.  If you have any questions, ask your nurse or doctor.               These medicines have changed or have updated prescriptions.        Dose/Directions    * dexamethasone 4 MG tablet   Commonly known as:  DECADRON   This may have changed:  Another medication with the same name was added. Make sure you understand how and when to take each.   Used for:  Multiple myeloma not having achieved remission (H)        Take 20mg (5 tabs) by mouth every week the morning of velcade injection. Then take 1 tab (4mg) by mouth for two days after darzalex infusion.   Quantity:  28 tablet   Refills:  0       * dexamethasone 4 MG tablet   Commonly known as:  DECADRON   This may have changed:  You were already taking a medication with the same name, and this prescription was added. Make sure you understand how and when to take each.   Used for:  Multiple myeloma not having achieved remission (H)        Take 20mg (5 tablets) PO every week on the morning of velcade injection. Then take 1 tablet (4mg) by mouth for two days after darzalex.   Quantity:  28 tablet   Refills:  0       * Notice:  This list has 2 medication(s) that are the same as other medications prescribed for you. Read the directions carefully, and ask your doctor or other care provider to review them with you.         Where to get your medicines      These medications were sent to [id number] - [place], [place] - [address] AT [facility] [address]  [address], [place] [address]     Phone:  [phone]     dexamethasone 4 MG tablet                Primary Care Provider Office Phone # Fax #    Addy Estrada  MD [place] [phone]       [facility] [address] [facility] [address]  [facility] [address]        Equal Access to Services     SARA ZELAYA : Hadmicaela murcia yvetteana Yukiyair, wajanetda iselawillianha, qaybta karandyda alonso, hung cartagena alexasergio diego laterimorteza sadler. So [facility] [phone].    ATENCIÓN: Si habla español, tiene a barlow disposición servicios gratuitos de asistencia lingüística. Llame al [phone].    We comply with applicable federal civil rights laws and [place] laws. We do not discriminate on the basis of race, color, national origin, age, disability sex, sexual orientation or gender identity.            Thank you!     Thank you for choosing [facility]  for your care. Our goal is always to provide you with excellent care. Hearing back from our patients is one way we can continue to improve our services. Please take a few minutes to complete the written survey that you may receive in the mail after your visit with us. Thank you!             Your Updated Medication List - Protect others around you: Learn how to safely use, store and throw away your medicines at www.disposemymeds.org.          This list is accurate as of: 6/28/17  2:33 PM.  Always use your most recent med list.                   Brand Name Dispense Instructions for use Diagnosis    acyclovir 400 MG tablet    ZOVIRAX    60 tablet    Take 1 tablet (400 mg) by mouth 2 times daily Viral Prophylaxis.    Multiple myeloma not having achieved remission (H)       ASPIRIN NOT PRESCRIBED    INTENTIONAL    0 each    Antiplatelet medication not prescribed intentionally due to Current anticoagulant therapy (warfarin/enoxaparin)    Paroxysmal atrial fibrillation (H)       CALCIUM CITRATE + PO      Take 2,000 mg by mouth daily 2 tabs        carboxymethylcellulose 0.5 % Soln ophthalmic solution    REFRESH PLUS     1 drop 4 times daily        CLARINEX PO      Take by mouth daily Taking claritin         COMPAZINE PO      Take 10 mg by mouth daily as needed        cycloSPORINE 0.05 % ophthalmic emulsion    RESTASIS     Place 1 drop into both eyes every 12 hours        DAILY MULTIVITAMIN PO      Take 1 tablet by mouth daily.    Routine general medical examination at a health care facility       * dexamethasone 4 MG tablet    DECADRON    28 tablet    Take 20mg (5 tabs) by mouth every week the morning of velcade injection. Then take 1 tab (4mg) by mouth for two days after darzalex infusion.    Multiple myeloma not having achieved remission (H)       * dexamethasone 4 MG tablet    DECADRON    28 tablet    Take 20mg (5 tablets) PO every week on the morning of velcade injection. Then take 1 tablet (4mg) by mouth for two days after darzalex.    Multiple myeloma not having achieved remission (H)       erythromycin ophthalmic ointment    ROMYCIN     Place 1 Application into both eyes At Bedtime        GENTLE STOOL SOFTENER PO      Take 100 mg by mouth daily        lidocaine-prilocaine cream    EMLA    30 g    Apply topically as needed for moderate pain Apply dollop size amount to port site 30-60 min prior to accessing    Multiple myeloma not having achieved remission (H)       LORazepam 0.5 MG tablet    ATIVAN    20 tablet    Take 1 tablet (0.5 mg) by mouth every 8 hours as needed for anxiety    Multiple myeloma not having achieved remission (H)       metoprolol 50 MG 24 hr tablet    TOPROL-XL    180 tablet    Take 1 tablet (50 mg) by mouth 2 times daily    SVT (supraventricular tachycardia) (H), Paroxysmal atrial fibrillation (H)       oxyCODONE 15 MG IR tablet    ROXICODONE    90 tablet    Take 1 tablet (15 mg) by mouth every 8 hours as needed for pain maximum 4 tablet(s) per day    Multiple myeloma not having achieved remission (H)       polyethylene glycol powder    MIRALAX/GLYCOLAX     Take 1 capful by mouth daily as needed    Bilateral leg edema       timolol 0.25 % ophthalmic solution    TIMOPTIC     1 drop every  morning        TYLENOL PO      Take 500 mg by mouth every 6 hours as needed for mild pain or fever        UNABLE TO FIND      MEDICATION NAME: Fresh Coat eye drops        VITAMIN D3 PO      Take 1,000 Units by mouth daily        warfarin 4 MG tablet    COUMADIN    120 tablet    Take 6 mg on Mon, Wed, Fri; 4 mg all other days or as directed by INR clinic.    Paroxysmal atrial fibrillation (H), Long-term (current) use of anticoagulants       ZOMETA IV      Inject into the vein every 30 days Every 3 month dosing        * Notice:  This list has 2 medication(s) that are the same as other medications prescribed for you. Read the directions carefully, and ask your doctor or other care provider to review them with you.

## 2017-06-28 NOTE — PROGRESS NOTES
"Infusion Nursing Note:  Amira Arreola presents today for C2 D1 Daratumumab, Velcade.    Patient seen by provider today: No   present during visit today: Not Applicable.    Note: Patient reports ongoing fatigue and \"fullness in my throat\" when swallowing at times. States she has not noticed a pattern after certain foods but when she eats a larger amount. Patient states she is eating smaller, more frequent meals. Encouraged to call clinic if worsens. No other new concerns. Patient verbalizes understanding of correct dosing/schedule of oral dexamethasone and acyclovir as ordered. Refill for dexamethasone given today (sent to [facility] per patient request).      Intravenous Access:  Labs drawn without difficulty.  Implanted Port.    Treatment Conditions:  Lab Results   Component Value Date    HGB 11.7 06/28/2017     Lab Results   Component Value Date    WBC 5.1 06/28/2017      Lab Results   Component Value Date    ANEU 4.7 06/28/2017     Lab Results   Component Value Date     06/28/2017             Lab Results   Component Value Date    CR 0.56 06/28/2017                     Lab Results   Component Value Date    BILITOTAL 0.7 06/28/2017           Lab Results   Component Value Date    ALBUMIN 3.4 06/28/2017                    Lab Results   Component Value Date    ALT 37 06/28/2017           Lab Results   Component Value Date    AST 22 06/28/2017     Results reviewed, labs MET treatment parameters, ok to proceed with treatment.      Post Infusion Assessment:  Patient tolerated infusion without incident.  Patient tolerated injection without incident. Velcade given SQ in right mid abdomen.  Blood return noted pre and post infusion.  Site patent and intact, free from redness, edema or discomfort.  No evidence of extravasations.  Access discontinued per protocol.    Discharge Plan:   Prescription refills given for dex.  Discharge instructions reviewed with: Patient.  Patient and/or family verbalized " understanding of discharge instructions and all questions answered.  Copy of AVS reviewed with patient and/or family.  Patient will return 7/5 for next appointment.  Patient discharged in stable condition accompanied by: self.  Departure Mode: Ambulatory.    Allyn Corona RN

## 2017-06-29 LAB
ALBUMIN SERPL ELPH-MCNC: 3.8 G/DL (ref 3.7–5.1)
ALPHA1 GLOB SERPL ELPH-MCNC: 0.3 G/DL (ref 0.2–0.4)
ALPHA2 GLOB SERPL ELPH-MCNC: 0.6 G/DL (ref 0.5–0.9)
B-GLOBULIN SERPL ELPH-MCNC: 0.6 G/DL (ref 0.6–1)
GAMMA GLOB SERPL ELPH-MCNC: 0.3 G/DL (ref 0.7–1.6)
M PROTEIN SERPL ELPH-MCNC: 0.1 G/DL
PROT PATTERN SERPL ELPH-IMP: ABNORMAL

## 2017-07-05 ENCOUNTER — HOSPITAL ENCOUNTER (OUTPATIENT)
Facility: CLINIC | Age: 82
Setting detail: SPECIMEN
Discharge: HOME | End: 2017-07-05
Attending: INTERNAL MEDICINE | Admitting: INTERNAL MEDICINE
Payer: MEDICARE

## 2017-07-05 ENCOUNTER — INFUSION THERAPY VISIT (OUTPATIENT)
Dept: INFUSION THERAPY | Facility: CLINIC | Age: 82
End: 2017-07-05
Attending: INTERNAL MEDICINE
Payer: MEDICARE

## 2017-07-05 VITALS
BODY MASS INDEX: 22.05 KG/M2 | SYSTOLIC BLOOD PRESSURE: 116 MMHG | RESPIRATION RATE: 16 BRPM | DIASTOLIC BLOOD PRESSURE: 60 MMHG | HEIGHT: 66 IN | HEART RATE: 60 BPM | WEIGHT: 137.2 LBS | TEMPERATURE: 98.5 F

## 2017-07-05 DIAGNOSIS — C90.00 MULTIPLE MYELOMA NOT HAVING ACHIEVED REMISSION (H): Primary | ICD-10-CM

## 2017-07-05 LAB
BASOPHILS # BLD AUTO: 0 10E9/L (ref 0–0.2)
BASOPHILS NFR BLD AUTO: 0 %
DIFFERENTIAL METHOD BLD: ABNORMAL
EOSINOPHIL # BLD AUTO: 0.1 10E9/L (ref 0–0.7)
EOSINOPHIL NFR BLD AUTO: 1.1 %
ERYTHROCYTE [DISTWIDTH] IN BLOOD BY AUTOMATED COUNT: 14.7 % (ref 10–15)
HCT VFR BLD AUTO: 35.8 % (ref 35–47)
HGB BLD-MCNC: 12.2 G/DL (ref 11.7–15.7)
IMM GRANULOCYTES # BLD: 0 10E9/L (ref 0–0.4)
IMM GRANULOCYTES NFR BLD: 0.4 %
LYMPHOCYTES # BLD AUTO: 0.8 10E9/L (ref 0.8–5.3)
LYMPHOCYTES NFR BLD AUTO: 15.4 %
MCH RBC QN AUTO: 34.2 PG (ref 26.5–33)
MCHC RBC AUTO-ENTMCNC: 34.1 G/DL (ref 31.5–36.5)
MCV RBC AUTO: 100 FL (ref 78–100)
MONOCYTES # BLD AUTO: 0.8 10E9/L (ref 0–1.3)
MONOCYTES NFR BLD AUTO: 15.8 %
NEUTROPHILS # BLD AUTO: 3.5 10E9/L (ref 1.6–8.3)
NEUTROPHILS NFR BLD AUTO: 67.3 %
NRBC # BLD AUTO: 0 10*3/UL
NRBC BLD AUTO-RTO: 0 /100
PLATELET # BLD AUTO: 142 10E9/L (ref 150–450)
RBC # BLD AUTO: 3.57 10E12/L (ref 3.8–5.2)
WBC # BLD AUTO: 5.3 10E9/L (ref 4–11)

## 2017-07-05 PROCEDURE — 96413 CHEMO IV INFUSION 1 HR: CPT

## 2017-07-05 PROCEDURE — 25000132 ZZH RX MED GY IP 250 OP 250 PS 637: Mod: GY | Performed by: INTERNAL MEDICINE

## 2017-07-05 PROCEDURE — 85025 COMPLETE CBC W/AUTO DIFF WBC: CPT | Performed by: INTERNAL MEDICINE

## 2017-07-05 PROCEDURE — 25000128 H RX IP 250 OP 636: Performed by: INTERNAL MEDICINE

## 2017-07-05 PROCEDURE — A9270 NON-COVERED ITEM OR SERVICE: HCPCS | Mod: GY | Performed by: INTERNAL MEDICINE

## 2017-07-05 PROCEDURE — 96415 CHEMO IV INFUSION ADDL HR: CPT

## 2017-07-05 PROCEDURE — 96401 CHEMO ANTI-NEOPL SQ/IM: CPT

## 2017-07-05 PROCEDURE — 96367 TX/PROPH/DG ADDL SEQ IV INF: CPT

## 2017-07-05 RX ORDER — ACETAMINOPHEN 325 MG/1
650 TABLET ORAL ONCE
Status: COMPLETED | OUTPATIENT
Start: 2017-07-05 | End: 2017-07-05

## 2017-07-05 RX ORDER — DIPHENHYDRAMINE HCL 25 MG
50 CAPSULE ORAL ONCE
Status: COMPLETED | OUTPATIENT
Start: 2017-07-05 | End: 2017-07-05

## 2017-07-05 RX ORDER — HEPARIN SODIUM (PORCINE) LOCK FLUSH IV SOLN 100 UNIT/ML 100 UNIT/ML
5 SOLUTION INTRAVENOUS EVERY 8 HOURS
Status: DISCONTINUED | OUTPATIENT
Start: 2017-07-05 | End: 2017-07-05 | Stop reason: HOSPADM

## 2017-07-05 RX ADMIN — SODIUM CHLORIDE 250 ML: 9 INJECTION, SOLUTION INTRAVENOUS at 10:49

## 2017-07-05 RX ADMIN — BORTEZOMIB 2.2 MG: 3.5 INJECTION, POWDER, LYOPHILIZED, FOR SOLUTION INTRAVENOUS; SUBCUTANEOUS at 11:39

## 2017-07-05 RX ADMIN — DEXAMETHASONE SODIUM PHOSPHATE 12 MG: 10 INJECTION, SOLUTION INTRAMUSCULAR; INTRAVENOUS at 10:50

## 2017-07-05 RX ADMIN — DIPHENHYDRAMINE HYDROCHLORIDE 50 MG: 25 CAPSULE ORAL at 10:39

## 2017-07-05 RX ADMIN — ACETAMINOPHEN 650 MG: 325 TABLET ORAL at 10:40

## 2017-07-05 RX ADMIN — DARATUMUMAB 1000 MG: 100 INJECTION, SOLUTION, CONCENTRATE INTRAVENOUS at 11:30

## 2017-07-05 RX ADMIN — SODIUM CHLORIDE, PRESERVATIVE FREE 5 ML: 5 INJECTION INTRAVENOUS at 14:42

## 2017-07-05 NOTE — MR AVS SNAPSHOT
After Visit Summary   7/5/2017    Amira Arreola    MRN: [id number]           Patient Information     Date Of Birth          7/17/1932        Visit Information        Provider Department      7/5/2017 9:30 AM  INFUSION CHAIR 11 [facility]        Today's Diagnoses     Multiple myeloma not having achieved remission (H)    -  1       Follow-ups after your visit        Your next 10 appointments already scheduled     Jul 07, 2017 10:30 AM CDT   Anticoagulation Visit with [facility] CL[facility]   [facility] ([address] [address]   [phone]            Jul 12, 2017  8:30 AM CDT   Level 7 with  INFUSION CHAIR 9   [facility] ([facility])    [facility] [place] [id number]  [place] [address]   [phone]            Jul 19, 2017  9:00 AM CDT   Level 7 with  INFUSION CHAIR 13   [facility] ([facility])    [facility]  [address]  [place] [address]   [phone]            Jul 19, 2017  1:00 PM CDT   Return Visit with Shayne Roberts MD   [facility] ([facility])    [facility]  [address]  [place] [address] [phone]            Sep 21, 2017 10:45 AM CDT   Return Visit with Ramos Rivera MD   [facility] [place] ([facility])    [address] [place] [address] [address]  [place] [address] [phone]              Who to contact     If you have questions or need follow up information about today's clinic visit or your schedule please contact [facility] directly at [phone].  Normal or non-critical lab and imaging results will be communicated to you by MyChart, letter or phone within  "4 business days after the clinic has received the results. If you do not hear from us within 7 days, please contact the clinic through [employer] or phone. If you have a critical or abnormal lab result, we will notify you by phone as soon as possible.  Submit refill requests through [employer] or call your pharmacy and they will forward the refill request to us. Please allow 3 business days for your refill to be completed.          Additional Information About Your Visit        [employer] Information     [employer] lets you send messages to your doctor, view your test results, renew your prescriptions, schedule appointments and more. To sign up, go to [email or web address] . Click on \"Log in\" on the left side of the screen, which will take you to the Welcome page. Then click on \"Sign up Now\" on the right side of the page.     You will be asked to enter the access code listed below, as well as some personal information. Please follow the directions to create your username and password.     Your access code is: [email or web address]  Expires: 2017  2:33 PM     Your access code will  in 90 days. If you need help or a new code, please call your [place] clinic or [phone].        Care EveryWhere ID     This is your Care EveryWhere ID. This could be used by other organizations to access your [place] medical records  [id number]        Your Vitals Were     Pulse Temperature Respirations Height BMI (Body Mass Index)       60 98.5  F (36.9  C) (Oral) 16 1.676 m (5' 6\") 22.14 kg/m2        Blood Pressure from Last 3 Encounters:   17 116/60   17 128/63   17 118/82    Weight from Last 3 Encounters:   17 62.2 kg (137 lb 3.2 oz)   17 62.2 kg (137 lb 3.2 oz)   17 64.4 kg (142 lb)              We Performed the Following     CBC with platelets differential        Primary Care Provider Office Phone # Fax #    Addy Frias [phone]       [facility] [address] [facility] [address]  [facility] [address]        Equal Access to Services     SARA ZELAYA : Hadii liane ku yvetteana Xiangali, vivianda iselawillianha, dahlia christianrandykira riossanchokira, hung dawn erinmorteza maradiagaedgardoporfirio dominique . So [facility] [phone].    ATENCIÓN: Si habla español, tiene a barlow disposición servicios gratuitos de asistencia lingüística. [facility] [phone].    We comply with applicable federal civil rights laws and [place] laws. We do not discriminate on the basis of race, color, national origin, age, disability sex, sexual orientation or gender identity.            Thank you!     Thank you for choosing [facility] AND [facility]  for your care. Our goal is always to provide you with excellent care. Hearing back from our patients is one way we can continue to improve our services. Please take a few minutes to complete the written survey that you may receive in the mail after your visit with us. Thank you!             Your Updated Medication List - Protect others around you: Learn how to safely use, store and throw away your medicines at www.disposemymeds.org.          This list is accurate as of: 7/5/17  3:03 PM.  Always use your most recent med list.                   Brand Name Dispense Instructions for use Diagnosis    acyclovir 400 MG tablet    ZOVIRAX    60 tablet    Take 1 tablet (400 mg) by mouth 2 times daily Viral Prophylaxis.    Multiple myeloma not having achieved remission (H)       ASPIRIN NOT PRESCRIBED    INTENTIONAL    0 each    Antiplatelet medication not prescribed intentionally due to Current anticoagulant therapy (warfarin/enoxaparin)    Paroxysmal atrial fibrillation (H)       CALCIUM CITRATE + PO      Take 2,000 mg by mouth daily 2 tabs        carboxymethylcellulose 0.5 % Soln ophthalmic solution    REFRESH PLUS     1 drop 4 times daily        CLARINEX PO      Take by mouth daily Taking claritin        COMPAZINE PO      Take 10 mg by mouth daily as needed        cycloSPORINE 0.05 %  ophthalmic emulsion    RESTASIS     Place 1 drop into both eyes every 12 hours        DAILY MULTIVITAMIN PO      Take 1 tablet by mouth daily.    Routine general medical examination at a health care facility       * dexamethasone 4 MG tablet    DECADRON    28 tablet    Take 20mg (5 tabs) by mouth every week the morning of velcade injection. Then take 1 tab (4mg) by mouth for two days after darzalex infusion.    Multiple myeloma not having achieved remission (H)       * dexamethasone 4 MG tablet    DECADRON    28 tablet    Take 20mg (5 tablets) PO every week on the morning of velcade injection. Then take 1 tablet (4mg) by mouth for two days after darzalex.    Multiple myeloma not having achieved remission (H)       erythromycin ophthalmic ointment    ROMYCIN     Place 1 Application into both eyes At Bedtime        GENTLE STOOL SOFTENER PO      Take 100 mg by mouth daily        lidocaine-prilocaine cream    EMLA    30 g    Apply topically as needed for moderate pain Apply dollop size amount to port site 30-60 min prior to accessing    Multiple myeloma not having achieved remission (H)       LORazepam 0.5 MG tablet    ATIVAN    20 tablet    Take 1 tablet (0.5 mg) by mouth every 8 hours as needed for anxiety    Multiple myeloma not having achieved remission (H)       metoprolol 50 MG 24 hr tablet    TOPROL-XL    180 tablet    Take 1 tablet (50 mg) by mouth 2 times daily    SVT (supraventricular tachycardia) (H), Paroxysmal atrial fibrillation (H)       oxyCODONE 15 MG IR tablet    ROXICODONE    90 tablet    Take 1 tablet (15 mg) by mouth every 8 hours as needed for pain maximum 4 tablet(s) per day    Multiple myeloma not having achieved remission (H)       polyethylene glycol powder    MIRALAX/GLYCOLAX     Take 1 capful by mouth daily as needed    Bilateral leg edema       timolol 0.25 % ophthalmic solution    TIMOPTIC     1 drop every morning        TYLENOL PO      Take 500 mg by mouth every 6 hours as needed for mild  pain or fever        UNABLE TO FIND      MEDICATION NAME: Fresh Coat eye drops        VITAMIN D3 PO      Take 1,000 Units by mouth daily        warfarin 4 MG tablet    COUMADIN    120 tablet    Take 6 mg on Mon, Wed, Fri; 4 mg all other days or as directed by INR clinic.    Paroxysmal atrial fibrillation (H), Long-term (current) use of anticoagulants       ZOMETA IV      Inject into the vein every 30 days Every 3 month dosing        * Notice:  This list has 2 medication(s) that are the same as other medications prescribed for you. Read the directions carefully, and ask your doctor or other care provider to review them with you.

## 2017-07-05 NOTE — PROGRESS NOTES
Infusion Nursing Note:  Amira Arreola presents today for C2D8  daratumumab/velcade.    Patient seen by provider today: No   present during visit today: Not Applicable.    Note: Pt seeing cardiologist for A-Fib and has discussed edema: tried lasix for edema, but increased urine frequency especially at night.    Intravenous Access:  Implanted Port.    Treatment Conditions:  Lab Results   Component Value Date    HGB 12.2 07/05/2017     Lab Results   Component Value Date    WBC 5.3 07/05/2017      Lab Results   Component Value Date    ANEU 3.5 07/05/2017     Lab Results   Component Value Date     07/05/2017      Results reviewed, labs MET treatment parameters, ok to proceed with treatment.      Post Infusion Assessment:  Patient tolerated infusion without incident.  Blood return noted pre and post infusion.  Site patent and intact, free from redness, edema or discomfort.  No evidence of extravasations.  Access discontinued per protocol.    Discharge Plan:   Discharge instructions reviewed with: Patient.  Patient and/or family verbalized understanding of discharge instructions and all questions answered.  Copy of AVS reviewed with patient and/or family.  Patient will return 7/12/17 for next appointment.  Patient discharged in stable condition accompanied by: self.  Departure Mode: Ambulatory to front door of [facility] to meet spouse.    Mera Johnson RN

## 2017-07-07 ENCOUNTER — ANTICOAGULATION THERAPY VISIT (OUTPATIENT)
Dept: NURSING | Facility: CLINIC | Age: 82
End: 2017-07-07
Payer: COMMERCIAL

## 2017-07-07 DIAGNOSIS — Z79.01 LONG-TERM (CURRENT) USE OF ANTICOAGULANTS: ICD-10-CM

## 2017-07-07 LAB — INR POINT OF CARE: 2.2 (ref 0.86–1.14)

## 2017-07-07 PROCEDURE — 85610 PROTHROMBIN TIME: CPT | Mod: QW

## 2017-07-07 PROCEDURE — 36416 COLLJ CAPILLARY BLOOD SPEC: CPT

## 2017-07-07 NOTE — PROGRESS NOTES
ANTICOAGULATION FOLLOW-UP CLINIC VISIT    Patient Name:  Amira Arreola  Date:  7/7/2017  Contact Type:  Face to Face    SUBJECTIVE:     Patient Findings     Positives No Problem Findings           OBJECTIVE    INR Protime   Date Value Ref Range Status   07/07/2017 2.2 (A) 0.86 - 1.14 Final       ASSESSMENT / PLAN  INR assessment THER    Recheck INR In: 2 WEEKS    INR Location Clinic      Anticoagulation Summary as of 7/7/2017     INR goal 2.0-3.0   Today's INR 2.2   Maintenance plan 6 mg (4 mg x 1.5) on Mon, Wed, Fri; 4 mg (4 mg x 1) all other days   Full instructions 6 mg on Mon, Wed, Fri; 4 mg all other days   Weekly total 34 mg   Plan last modified Dayana Barrera RN (3/10/2017)   Next INR check 7/21/2017   Target end date Indefinite    Indications   Long-term (current) use of anticoagulants [Z79.01] [Z79.01]  Atrial fibrillation (H) [I48.91] (Resolved) [I48.91]         Anticoagulation Episode Summary     INR check location     Preferred lab     Send INR reminders to  [facility]    Comments       Anticoagulation Care Providers     Provider Role Specialty Phone number    Addy Frias MD Responsible Internal Medicine [phone]            See the Encounter Report to view Anticoagulation Flowsheet and Dosing Calendar (Go to Encounters tab in chart review, and find the Anticoagulation Therapy Visit)    Dosage adjustment made based on physician directed care plan.    INR 2.2 today.  Continue with 6 mg on Mon, Wed, Fri;  4 mg all other days = 34 mg weekly.  Recheck in 2 weeks.     Dayana Barrera RN

## 2017-07-07 NOTE — MR AVS SNAPSHOT
Amira Arreola   7/7/2017 10:30 AM   Anticoagulation Therapy Visit    Description:  84 year old female   Provider:  [facility] ANTICO[facility]ATION [facility]IN[facility]   Department:  Ec Nurse           INR as of 7/7/2017     Today's INR 2.2      Anticoagulation Summary as of 7/7/2017     INR goal 2.0-3.0   Today's INR 2.2   Full instructions 6 mg on Mon, Wed, Fri; 4 mg all other days   Next INR check 7/21/2017    Indications   Long-term (current) use of anticoagulants [Z79.01] [Z79.01]  Atrial fibrillation (H) [I48.91] (Resolved) [I48.91]         Description     INR 2.2 today.  Continue with 6 mg on Mon, Wed, Fri;  4 mg all other days = 34 mg weekly.  Recheck in 2 weeks.         Your next Anticoagulation Clinic appointment(s)     Jul 07, 2017 10:30 AM CDT   Anticoagulation Visit with [facility] AN[facility] CL[facility]   [facility] [place] [address]   [phone]            Jul 21, 2017 10:00 AM CDT   Anticoagulation Visit with [facility] ANTICOAGULATION CLINIC   [facility] ([address] [address]   [phone]              Contact Numbers     Clinic Number:         July 2017 Details    Sun Mon Tue Wed Thu Fri Sat           1                 2               3               4               5               6               7      6 mg   See details      8      4 mg           9      4 mg         10      6 mg         11      4 mg         12      6 mg         13      4 mg         14      6 mg         15      4 mg           16      4 mg         17      6 mg         18      4 mg         19      6 mg         20      4 mg         21            22                 23               24               25               26               27               28               29                 30               31                     Date Details   07/07 This INR check       Date of next INR:  7/21/2017          How to take your warfarin dose     To take:  4 mg Take 1 of the 4 mg tablets.    To take:  6 mg Take 1.5 of the 4 mg tablets.

## 2017-07-12 ENCOUNTER — INFUSION THERAPY VISIT (OUTPATIENT)
Dept: INFUSION THERAPY | Facility: CLINIC | Age: 82
End: 2017-07-12
Attending: INTERNAL MEDICINE
Payer: MEDICARE

## 2017-07-12 ENCOUNTER — HOSPITAL ENCOUNTER (OUTPATIENT)
Facility: CLINIC | Age: 82
Setting detail: SPECIMEN
Discharge: HOME | End: 2017-07-12
Attending: INTERNAL MEDICINE | Admitting: INTERNAL MEDICINE
Payer: MEDICARE

## 2017-07-12 VITALS
DIASTOLIC BLOOD PRESSURE: 61 MMHG | WEIGHT: 137.6 LBS | SYSTOLIC BLOOD PRESSURE: 128 MMHG | HEART RATE: 70 BPM | TEMPERATURE: 97.7 F | HEIGHT: 66 IN | BODY MASS INDEX: 22.11 KG/M2 | RESPIRATION RATE: 16 BRPM

## 2017-07-12 DIAGNOSIS — C90.00 MULTIPLE MYELOMA NOT HAVING ACHIEVED REMISSION (H): Primary | ICD-10-CM

## 2017-07-12 LAB
BASOPHILS # BLD AUTO: 0 10E9/L (ref 0–0.2)
BASOPHILS NFR BLD AUTO: 0 %
DIFFERENTIAL METHOD BLD: ABNORMAL
EOSINOPHIL # BLD AUTO: 0.1 10E9/L (ref 0–0.7)
EOSINOPHIL NFR BLD AUTO: 1.5 %
ERYTHROCYTE [DISTWIDTH] IN BLOOD BY AUTOMATED COUNT: 14.7 % (ref 10–15)
HCT VFR BLD AUTO: 34.3 % (ref 35–47)
HGB BLD-MCNC: 11.7 G/DL (ref 11.7–15.7)
IMM GRANULOCYTES # BLD: 0 10E9/L (ref 0–0.4)
IMM GRANULOCYTES NFR BLD: 0.4 %
LYMPHOCYTES # BLD AUTO: 0.9 10E9/L (ref 0.8–5.3)
LYMPHOCYTES NFR BLD AUTO: 15.5 %
MCH RBC QN AUTO: 34.2 PG (ref 26.5–33)
MCHC RBC AUTO-ENTMCNC: 34.1 G/DL (ref 31.5–36.5)
MCV RBC AUTO: 100 FL (ref 78–100)
MONOCYTES # BLD AUTO: 0.8 10E9/L (ref 0–1.3)
MONOCYTES NFR BLD AUTO: 15.3 %
NEUTROPHILS # BLD AUTO: 3.7 10E9/L (ref 1.6–8.3)
NEUTROPHILS NFR BLD AUTO: 67.3 %
NRBC # BLD AUTO: 0 10*3/UL
NRBC BLD AUTO-RTO: 0 /100
PLATELET # BLD AUTO: 135 10E9/L (ref 150–450)
RBC # BLD AUTO: 3.42 10E12/L (ref 3.8–5.2)
WBC # BLD AUTO: 5.5 10E9/L (ref 4–11)

## 2017-07-12 PROCEDURE — 25000132 ZZH RX MED GY IP 250 OP 250 PS 637: Mod: GY | Performed by: INTERNAL MEDICINE

## 2017-07-12 PROCEDURE — 96415 CHEMO IV INFUSION ADDL HR: CPT

## 2017-07-12 PROCEDURE — 96367 TX/PROPH/DG ADDL SEQ IV INF: CPT

## 2017-07-12 PROCEDURE — 96413 CHEMO IV INFUSION 1 HR: CPT

## 2017-07-12 PROCEDURE — A9270 NON-COVERED ITEM OR SERVICE: HCPCS | Mod: GY | Performed by: INTERNAL MEDICINE

## 2017-07-12 PROCEDURE — 25000128 H RX IP 250 OP 636: Mod: JW | Performed by: INTERNAL MEDICINE

## 2017-07-12 PROCEDURE — 85025 COMPLETE CBC W/AUTO DIFF WBC: CPT | Performed by: INTERNAL MEDICINE

## 2017-07-12 RX ORDER — ACETAMINOPHEN 325 MG/1
650 TABLET ORAL ONCE
Status: COMPLETED | OUTPATIENT
Start: 2017-07-12 | End: 2017-07-12

## 2017-07-12 RX ORDER — HEPARIN SODIUM (PORCINE) LOCK FLUSH IV SOLN 100 UNIT/ML 100 UNIT/ML
5 SOLUTION INTRAVENOUS EVERY 8 HOURS
Status: DISCONTINUED | OUTPATIENT
Start: 2017-07-12 | End: 2017-07-12 | Stop reason: HOSPADM

## 2017-07-12 RX ADMIN — DARATUMUMAB 1000 MG: 100 INJECTION, SOLUTION, CONCENTRATE INTRAVENOUS at 10:07

## 2017-07-12 RX ADMIN — ACETAMINOPHEN 650 MG: 325 TABLET ORAL at 09:18

## 2017-07-12 RX ADMIN — BORTEZOMIB 2.2 MG: 3.5 INJECTION, POWDER, LYOPHILIZED, FOR SOLUTION INTRAVENOUS; SUBCUTANEOUS at 10:11

## 2017-07-12 RX ADMIN — DIPHENHYDRAMINE HYDROCHLORIDE 50 MG: 50 INJECTION, SOLUTION INTRAMUSCULAR; INTRAVENOUS at 09:38

## 2017-07-12 RX ADMIN — DEXAMETHASONE SODIUM PHOSPHATE 12 MG: 10 INJECTION, SOLUTION INTRAMUSCULAR; INTRAVENOUS at 09:19

## 2017-07-12 RX ADMIN — SODIUM CHLORIDE, PRESERVATIVE FREE 5 ML: 5 INJECTION INTRAVENOUS at 13:22

## 2017-07-12 RX ADMIN — SODIUM CHLORIDE 100 ML: 9 INJECTION, SOLUTION INTRAVENOUS at 09:04

## 2017-07-12 NOTE — PROGRESS NOTES
Infusion Nursing Note:  Amira Arreola presents today for C2D15 Daratumumab/Velcade.    Patient seen by provider today: No   present during visit today: Not Applicable.    Note: N/A.    Intravenous Access:  Labs drawn without difficulty.  Implanted Port.    Treatment Conditions:  Lab Results   Component Value Date    HGB 11.7 07/12/2017     Lab Results   Component Value Date    WBC 5.5 07/12/2017      Lab Results   Component Value Date    ANEU 3.7 07/12/2017     Lab Results   Component Value Date     07/12/2017      Results reviewed, labs MET treatment parameters, ok to proceed with treatment.      Post Infusion Assessment:  Patient tolerated infusion without incident.  Site patent and intact, free from redness, edema or discomfort.  No evidence of extravasations.  Access discontinued per protocol.    Discharge Plan:   Patient and/or family verbalized understanding of discharge instructions and all questions answered.  Copy of AVS reviewed with patient and/or family.  Patient will return next wednesday for next appointment.  Patient discharged in stable condition accompanied by: self.  Departure Mode: Ambulatory.    David Kearney RN

## 2017-07-12 NOTE — MR AVS SNAPSHOT
After Visit Summary   7/12/2017    Amira Arreola    MRN: [id number]           Patient Information     Date Of Birth          7/17/1932        Visit Information        Provider Department      7/12/2017 8:30 AM  INFUSION CHAIR 9 [facility]        Today's Diagnoses     Multiple myeloma not having achieved remission (H)    -  1       Follow-ups after your visit        Your next 10 appointments already scheduled     Jul 19, 2017  9:00 AM CDT   Level 7 with  INFUSION CHAIR 13   [facility] ([facility])    [facility]  [address]  [facility] [address]   [phone]            Jul 19, 2017  1:00 PM CDT   Return Visit with Shayne Roberts MD   [facility] ([facility])    [facility]  [address]  [facility] [address]   [phone]            Jul 21, 2017 10:00 AM CDT   Anticoagulation Visit with EC ANTICOAGULATION CLINIC   [facility] ([address] [address]   [phone]            Sep 21, 2017 10:45 AM CDT   Return Visit with Ramos Rivera MD   [facility] [place] ([facility])    [address] [place] [address] [address]  [facility] [address] [phone]              Who to contact     If you have questions or need follow up information about today's clinic visit or your schedule please contact [facility] directly at [phone].  Normal or non-critical lab and imaging results will be communicated to you by MyChart, letter or phone within 4 business days after the clinic has received the results. If you do not hear from us within 7 days, please contact the clinic through MyChart or phone. If you have a critical or abnormal lab result, we will notify you by phone as soon as  "possible.  Submit refill requests through [employer] or call your pharmacy and they will forward the refill request to us. Please allow 3 business days for your refill to be completed.          Additional Information About Your Visit        [employer] Information     [employer] lets you send messages to your doctor, view your test results, renew your prescriptions, schedule appointments and more. To sign up, go to [email or web address] . Click on \"Log in\" on the left side of the screen, which will take you to the Welcome page. Then click on \"Sign up Now\" on the right side of the page.     You will be asked to enter the access code listed below, as well as some personal information. Please follow the directions to create your username and password.     Your access code is: [email or web address]  Expires: 2017  2:33 PM     Your access code will  in 90 days. If you need help or a new code, please call your [place] clinic or [phone].        Care EveryWhere ID     This is your Care EveryWhere ID. This could be used by other organizations to access your [place] medical records  [id number]        Your Vitals Were     Pulse Temperature Respirations Height BMI (Body Mass Index)       70 97.7  F (36.5  C) (Oral) 16 1.676 m (5' 5.98\") 22.22 kg/m2        Blood Pressure from Last 3 Encounters:   17 128/61   17 116/60   17 128/63    Weight from Last 3 Encounters:   17 62.4 kg (137 lb 9.6 oz)   17 62.2 kg (137 lb 3.2 oz)   17 62.2 kg (137 lb 3.2 oz)              We Performed the Following     CBC with platelets differential        Primary Care Provider Office Phone # Fax #    Addy Frias [phone]       [facility] [address]  [place] [address]        Equal Access to Services     SARA ZELAYA AH: Hadii liane Galloway, rhonda gutierrez, dahlia gupta, hung sadler. So [facility] " [phone].    ATENCIÓN: Si feli park, tiene a barlow disposición servicios gratuitos de asistencia lingüística. Kadie eller [phone].    We comply with applicable federal civil rights laws and [place] laws. We do not discriminate on the basis of race, color, national origin, age, disability sex, sexual orientation or gender identity.            Thank you!     Thank you for choosing [facility] AND [facility]  for your care. Our goal is always to provide you with excellent care. Hearing back from our patients is one way we can continue to improve our services. Please take a few minutes to complete the written survey that you may receive in the mail after your visit with us. Thank you!             Your Updated Medication List - Protect others around you: Learn how to safely use, store and throw away your medicines at www.disposemymeds.org.          This list is accurate as of: 7/12/17  1:27 PM.  Always use your most recent med list.                   Brand Name Dispense Instructions for use Diagnosis    acyclovir 400 MG tablet    ZOVIRAX    60 tablet    Take 1 tablet (400 mg) by mouth 2 times daily Viral Prophylaxis.    Multiple myeloma not having achieved remission (H)       ASPIRIN NOT PRESCRIBED    INTENTIONAL    0 each    Antiplatelet medication not prescribed intentionally due to Current anticoagulant therapy (warfarin/enoxaparin)    Paroxysmal atrial fibrillation (H)       CALCIUM CITRATE + PO      Take 2,000 mg by mouth daily 2 tabs        carboxymethylcellulose 0.5 % Soln ophthalmic solution    REFRESH PLUS     1 drop 4 times daily        CLARINEX PO      Take by mouth daily Taking claritin        COMPAZINE PO      Take 10 mg by mouth daily as needed        cycloSPORINE 0.05 % ophthalmic emulsion    RESTASIS     Place 1 drop into both eyes every 12 hours        DAILY MULTIVITAMIN PO      Take 1 tablet by mouth daily.    Routine general medical examination at a health care facility        * dexamethasone 4 MG tablet    DECADRON    28 tablet    Take 20mg (5 tabs) by mouth every week the morning of velcade injection. Then take 1 tab (4mg) by mouth for two days after darzalex infusion.    Multiple myeloma not having achieved remission (H)       * dexamethasone 4 MG tablet    DECADRON    28 tablet    Take 20mg (5 tablets) PO every week on the morning of velcade injection. Then take 1 tablet (4mg) by mouth for two days after darzalex.    Multiple myeloma not having achieved remission (H)       erythromycin ophthalmic ointment    ROMYCIN     Place 1 Application into both eyes At Bedtime        GENTLE STOOL SOFTENER PO      Take 100 mg by mouth daily        lidocaine-prilocaine cream    EMLA    30 g    Apply topically as needed for moderate pain Apply dollop size amount to port site 30-60 min prior to accessing    Multiple myeloma not having achieved remission (H)       LORazepam 0.5 MG tablet    ATIVAN    20 tablet    Take 1 tablet (0.5 mg) by mouth every 8 hours as needed for anxiety    Multiple myeloma not having achieved remission (H)       metoprolol 50 MG 24 hr tablet    TOPROL-XL    180 tablet    Take 1 tablet (50 mg) by mouth 2 times daily    SVT (supraventricular tachycardia) (H), Paroxysmal atrial fibrillation (H)       oxyCODONE 15 MG IR tablet    ROXICODONE    90 tablet    Take 1 tablet (15 mg) by mouth every 8 hours as needed for pain maximum 4 tablet(s) per day    Multiple myeloma not having achieved remission (H)       polyethylene glycol powder    MIRALAX/GLYCOLAX     Take 1 capful by mouth daily as needed    Bilateral leg edema       timolol 0.25 % ophthalmic solution    TIMOPTIC     1 drop every morning        TYLENOL PO      Take 500 mg by mouth every 6 hours as needed for mild pain or fever        UNABLE TO FIND      MEDICATION NAME: Fresh Coat eye drops        VITAMIN D3 PO      Take 1,000 Units by mouth daily        warfarin 4 MG tablet    COUMADIN    120 tablet    Take 6 mg on Mon,  Wed, Fri; 4 mg all other days or as directed by INR clinic.    Paroxysmal atrial fibrillation (H), Long-term (current) use of anticoagulants       ZOMETA IV      Inject into the vein every 30 days Every 3 month dosing        * Notice:  This list has 2 medication(s) that are the same as other medications prescribed for you. Read the directions carefully, and ask your doctor or other care provider to review them with you.

## 2017-07-19 ENCOUNTER — ONCOLOGY VISIT (OUTPATIENT)
Dept: ONCOLOGY | Facility: CLINIC | Age: 82
End: 2017-07-19
Attending: INTERNAL MEDICINE
Payer: MEDICARE

## 2017-07-19 ENCOUNTER — HOSPITAL ENCOUNTER (OUTPATIENT)
Facility: CLINIC | Age: 82
Setting detail: SPECIMEN
Discharge: HOME | End: 2017-07-19
Admitting: INTERNAL MEDICINE
Payer: MEDICARE

## 2017-07-19 ENCOUNTER — INFUSION THERAPY VISIT (OUTPATIENT)
Dept: INFUSION THERAPY | Facility: CLINIC | Age: 82
End: 2017-07-19
Attending: INTERNAL MEDICINE
Payer: MEDICARE

## 2017-07-19 VITALS
WEIGHT: 134.2 LBS | RESPIRATION RATE: 16 BRPM | DIASTOLIC BLOOD PRESSURE: 77 MMHG | HEART RATE: 76 BPM | OXYGEN SATURATION: 96 % | SYSTOLIC BLOOD PRESSURE: 152 MMHG | BODY MASS INDEX: 21.67 KG/M2

## 2017-07-19 VITALS
DIASTOLIC BLOOD PRESSURE: 73 MMHG | HEIGHT: 66 IN | TEMPERATURE: 96.2 F | HEART RATE: 79 BPM | WEIGHT: 134.2 LBS | SYSTOLIC BLOOD PRESSURE: 135 MMHG | BODY MASS INDEX: 21.57 KG/M2 | RESPIRATION RATE: 16 BRPM

## 2017-07-19 DIAGNOSIS — C90.00 MULTIPLE MYELOMA NOT HAVING ACHIEVED REMISSION (H): ICD-10-CM

## 2017-07-19 DIAGNOSIS — C90.01 MULTIPLE MYELOMA IN REMISSION (H): Primary | ICD-10-CM

## 2017-07-19 DIAGNOSIS — C90.00 MULTIPLE MYELOMA NOT HAVING ACHIEVED REMISSION (H): Primary | ICD-10-CM

## 2017-07-19 LAB
BASOPHILS # BLD AUTO: 0 10E9/L (ref 0–0.2)
BASOPHILS NFR BLD AUTO: 0.1 %
CREAT SERPL-MCNC: 0.56 MG/DL (ref 0.52–1.04)
DIFFERENTIAL METHOD BLD: ABNORMAL
EOSINOPHIL # BLD AUTO: 0 10E9/L (ref 0–0.7)
EOSINOPHIL NFR BLD AUTO: 0.3 %
ERYTHROCYTE [DISTWIDTH] IN BLOOD BY AUTOMATED COUNT: 14.4 % (ref 10–15)
GFR SERPL CREATININE-BSD FRML MDRD: NORMAL ML/MIN/1.7M2
HCT VFR BLD AUTO: 35.8 % (ref 35–47)
HGB BLD-MCNC: 12.3 G/DL (ref 11.7–15.7)
IMM GRANULOCYTES # BLD: 0 10E9/L (ref 0–0.4)
IMM GRANULOCYTES NFR BLD: 0.4 %
LYMPHOCYTES # BLD AUTO: 0.5 10E9/L (ref 0.8–5.3)
LYMPHOCYTES NFR BLD AUTO: 6.6 %
MCH RBC QN AUTO: 34.1 PG (ref 26.5–33)
MCHC RBC AUTO-ENTMCNC: 34.4 G/DL (ref 31.5–36.5)
MCV RBC AUTO: 99 FL (ref 78–100)
MONOCYTES # BLD AUTO: 0.2 10E9/L (ref 0–1.3)
MONOCYTES NFR BLD AUTO: 2.6 %
NEUTROPHILS # BLD AUTO: 6.5 10E9/L (ref 1.6–8.3)
NEUTROPHILS NFR BLD AUTO: 90 %
NRBC # BLD AUTO: 0 10*3/UL
NRBC BLD AUTO-RTO: 0 /100
PLATELET # BLD AUTO: 147 10E9/L (ref 150–450)
RBC # BLD AUTO: 3.61 10E12/L (ref 3.8–5.2)
WBC # BLD AUTO: 7.3 10E9/L (ref 4–11)

## 2017-07-19 PROCEDURE — 25000128 H RX IP 250 OP 636: Performed by: INTERNAL MEDICINE

## 2017-07-19 PROCEDURE — 82565 ASSAY OF CREATININE: CPT

## 2017-07-19 PROCEDURE — 96413 CHEMO IV INFUSION 1 HR: CPT

## 2017-07-19 PROCEDURE — 96415 CHEMO IV INFUSION ADDL HR: CPT

## 2017-07-19 PROCEDURE — 99214 OFFICE O/P EST MOD 30 MIN: CPT | Performed by: INTERNAL MEDICINE

## 2017-07-19 PROCEDURE — 96367 TX/PROPH/DG ADDL SEQ IV INF: CPT

## 2017-07-19 PROCEDURE — A9270 NON-COVERED ITEM OR SERVICE: HCPCS | Mod: GY | Performed by: INTERNAL MEDICINE

## 2017-07-19 PROCEDURE — 96401 CHEMO ANTI-NEOPL SQ/IM: CPT

## 2017-07-19 PROCEDURE — 25000132 ZZH RX MED GY IP 250 OP 250 PS 637: Mod: GY | Performed by: INTERNAL MEDICINE

## 2017-07-19 PROCEDURE — 85025 COMPLETE CBC W/AUTO DIFF WBC: CPT | Performed by: INTERNAL MEDICINE

## 2017-07-19 PROCEDURE — 96375 TX/PRO/DX INJ NEW DRUG ADDON: CPT

## 2017-07-19 RX ORDER — OXYCODONE HYDROCHLORIDE 15 MG/1
15 TABLET ORAL EVERY 8 HOURS PRN
Qty: 90 TABLET | Refills: 0 | Status: SHIPPED | OUTPATIENT
Start: 2017-07-19 | End: 2017-08-16

## 2017-07-19 RX ORDER — HEPARIN SODIUM (PORCINE) LOCK FLUSH IV SOLN 100 UNIT/ML 100 UNIT/ML
5 SOLUTION INTRAVENOUS EVERY 8 HOURS
Status: DISCONTINUED | OUTPATIENT
Start: 2017-07-19 | End: 2017-07-19 | Stop reason: HOSPADM

## 2017-07-19 RX ORDER — ACETAMINOPHEN 325 MG/1
650 TABLET ORAL ONCE
Status: COMPLETED | OUTPATIENT
Start: 2017-07-19 | End: 2017-07-19

## 2017-07-19 RX ADMIN — SODIUM CHLORIDE, PRESERVATIVE FREE 5 ML: 5 INJECTION INTRAVENOUS at 14:07

## 2017-07-19 RX ADMIN — DARATUMUMAB 1000 MG: 100 INJECTION, SOLUTION, CONCENTRATE INTRAVENOUS at 10:48

## 2017-07-19 RX ADMIN — ACETAMINOPHEN 650 MG: 325 TABLET ORAL at 10:10

## 2017-07-19 RX ADMIN — DIPHENHYDRAMINE HYDROCHLORIDE 50 MG: 50 INJECTION, SOLUTION INTRAMUSCULAR; INTRAVENOUS at 10:30

## 2017-07-19 RX ADMIN — DEXAMETHASONE SODIUM PHOSPHATE 12 MG: 10 INJECTION, SOLUTION INTRAMUSCULAR; INTRAVENOUS at 10:11

## 2017-07-19 RX ADMIN — SODIUM CHLORIDE 100 ML: 9 INJECTION, SOLUTION INTRAVENOUS at 10:10

## 2017-07-19 RX ADMIN — BORTEZOMIB 2.2 MG: 3.5 INJECTION, POWDER, LYOPHILIZED, FOR SOLUTION INTRAVENOUS; SUBCUTANEOUS at 14:02

## 2017-07-19 ASSESSMENT — PAIN SCALES - GENERAL: PAINLEVEL: NO PAIN (0)

## 2017-07-19 NOTE — PROGRESS NOTES
"Infusion Nursing Note:  Amira Arreola presents today for C2D22 Velcade/Darzalex  Patient seen by provider today: Yes: exam with Dr. Roberts today   present during visit today: Not Applicable.    Note: pt states she has a \"hoarce voice\" and she thinks this is from the Decadron. Pt will mention this to Dr. Roberts as well as the swelling in her ankles.    Intravenous Access:  Implanted Port.    Treatment Conditions:  Lab Results   Component Value Date    HGB 12.3 07/19/2017     Lab Results   Component Value Date    WBC 7.3 07/19/2017      Lab Results   Component Value Date    ANEU 6.5 07/19/2017     Lab Results   Component Value Date     07/19/2017      Lab Results   Component Value Date     06/13/2017                   Lab Results   Component Value Date    POTASSIUM 4.0 06/13/2017           No results found for: MAG         Lab Results   Component Value Date    CR 0.56 07/19/2017                   Lab Results   Component Value Date    BRADLEY 9.7 06/13/2017                Lab Results   Component Value Date    BILITOTAL 0.7 06/28/2017           Lab Results   Component Value Date    ALBUMIN 3.4 06/28/2017                    Lab Results   Component Value Date    ALT 37 06/28/2017           Lab Results   Component Value Date    AST 22 06/28/2017     Results reviewed, labs MET treatment parameters, ok to proceed with treatment.          Post Infusion Assessment:  Patient tolerated infusion without incident.  Blood return noted pre and post infusion.  Site patent and intact, free from redness, edema or discomfort.  No evidence of extravasations.  Access discontinued per protocol.    Discharge Plan:   Discharge instructions reviewed with: Patient.  Copy of AVS reviewed with patient and/or family.  Patient will return 7/26 for next appointment.  Patient discharged in stable condition accompanied by: self.    SHELLIE Renner RN (discharge)                      "

## 2017-07-19 NOTE — PROGRESS NOTES
"Oncology Rooming Note    July 19, 2017 9:17 AM   Amira Arreola is a 85 year old female who presents for:    Chief Complaint   Patient presents with     Oncology Clinic Visit     Multiple myeloma not having achieved remission      Initial Vitals: /77 (BP Location: Left arm, Patient Position: Chair, Cuff Size: Adult Regular)  Pulse 76  Resp 16  Wt 60.9 kg (134 lb 3.2 oz)  SpO2 96%  BMI 21.67 kg/m2 Estimated body mass index is 21.67 kg/(m^2) as calculated from the following:    Height as of 7/12/17: 1.676 m (5' 5.98\").    Weight as of this encounter: 60.9 kg (134 lb 3.2 oz). Body surface area is 1.68 meters squared.  No Pain (0) Comment: Data Unavailable   No LMP recorded. Patient is postmenopausal.  Allergies reviewed: Yes  Medications reviewed: Yes    Medications: MEDICATION REFILL NEEDED ON OXYCODONE    Pharmacy name entered into EPIC:PAPER PRESCRIPTION TO PATIENT     Clinical concerns: None                5 minutes for nursing intake (face to face time)     Zora Bentley MA        DISCHARGE PLAN:    Continue chemo/ Reported to SHELLIE Carrillo/ INfusion  MD follow up on 8/16 w/ treatment    Dates given to  to arrange for velcade/ daratumumab    7/26, 8/2, 8/9 and 8/16 with MD appt.      Next appointments: See patient instruction section  Departure Mode: Ambulatory  Accompanied by: self  8 minutes for nursing discharge (face to face time)   Tameka Daigle RN        "

## 2017-07-19 NOTE — PROGRESS NOTES
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     1.  She had work-up for thrombocytopenia.       - On 09/21/2015, WBC of 4.2, hemoglobin of 13.2 and platelets of 138. CMP normal except mildly low protein of 6.4.   -On 09/29/2017, SPEP does not reveal any M-spike.   - On 10/02/2015, JANET does not reveal any monoclonal protein.     - On 10/22/2015, urine immunofixation reveals monoclonal free kappa light chain.    2. On 05/11/2016, kappa light chain of 50, lambda light chain of 0.32 and ratio of kappa to lambda of 156.2.  3. Skeletal survey on 05/23/2016 does not reveal any lytic lesion.    4. Bone marrow biopsy on 05/25/2016 reveals 40-50% kappa light chain restricted plasma cells.  Cytogenetics is normal. 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    5. MRI of bones on 06/21/2016 and 06/22/2016 reveals myeloma lesions.  6. On 08/24/2016, she was started on revlimid 25 mg 3 weeks on and 1 week off along with dexamethasone 20 mg weekly. Due to cytopenia, dose was subsequently reduced to 15 mg a day. Treatment in between had to be delayed because of cytopenia. She did not have any significant response to treatment.   7.Velcade and dexamethasone started on 03/21/2017.    8. On 03/21/2017, kappa free light chain was 52.5.  It decreased to 41.75 on 04/18/2017.  It has increased to 60.75 on 05/16/2017.    9. Daratumumab added on 05/31/2017.   10.  On 06/28/2017, kappa free light chain is down to 6.43.     SUBJECTIVE:    Ms. Amira Arreola is an 85-year-old female with kappa free light chain multiple myeloma.  She is on treatment with Velcade, daratumumab and dexamethasone since 05/31/2017.  Overall she is tolerating it well.  Her disease is responding.  Sage free light chain has decreased.       Overall she is doing well.  She has fatigue.  No  worsening of fatigue.  She also has pain in the in mid upper back.  Sometimes she has pain in lower ribs anteriorly.  She takes about 3 oxycodone a day.  It controls the pain.  Occasionally she has to take Tylenol.   She wants a refill on oxycodone.       Denies any headache.  No dizziness.  No chest pain.  No shortness of breath.  No nausea or vomiting.  Appetite has been good.  No fever or chills.  No urinary complaints. She has constipation.  Walking has been fairly stable.  She has not fallen down.    She has ankle edema.  It is better in the morning.  It gets worse in the evening.  When she uses a compression stocking, it decreases.      PHYSICAL EXAMINATION:   Alert and oriented x 3.   EYES:  No icterus.   THROAT:  No ulcer or thrush.   NECK:  Supple. No lymphadenopathy or thyromegaly.   AXILLAE:  No lymphadenopathy.   LUNGS:  Good air entry bilaterally.  No crackles or wheezing.   HEART:  Regular.  No murmur.   ABDOMEN:  Soft and nontender.  No mass.   EXTREMITIES: Mild ankle edema. No calf swelling or tenderness.   SKIN:  No rash.        LABORATORY DATA:  Reviewed.       ASSESSMENT:   1.  An 85-year-old female with kappa free light chain multiple myeloma on daratumumab, Velcade and dexamethasone. She is tolerating it well.  Disease is responding.  2.  Back pain and rib pain from myeloma bone lesion.   3.  Thrombocytopenia from myeloma.       PLAN:   1.  The patient is doing well from myeloma.  Labs were reviewed.  She was happy to know that kappa free light chain is decreasing.  Disease is responding to Velcade, daratumumab and dexamethasone.  She is tolerating treatment well.  She will continue on Velcade, daratumumab and dexamethasone.  She is not having any significant side effects. She does not have neuropathic symptoms.     2.  She will continue on Zometa.  She is tolerating it well.  She is not having any dental or jaw related problem.     3.  For pain, she will continue on oxycodone.  She uses about 3  a day.  Prescription refilled.    4.  She had a few questions, which were all answered.  I will see her in about a month. Advised her to call us if she has fever, chills, infection, worsening weakness, shortness of breath or any other concerns.

## 2017-07-19 NOTE — MR AVS SNAPSHOT
After Visit Summary   7/19/2017    Amira Arreola    MRN: [id number]           Patient Information     Date Of Birth          7/17/1932        Visit Information        Provider Department      7/19/2017 9:00 AM SH INFUSION CHAIR 13 [facility]        Today's Diagnoses     Multiple myeloma in remission (H)    -  1    Multiple myeloma not having achieved remission (H)           Follow-ups after your visit        Your next 10 appointments already scheduled     Jul 21, 2017 10:00 AM CDT   Anticoagulation Visit with [facility] ANTICOAGULATION CLINIC   [facility] [place] [place] ([address] [address]   [phone]            Jul 26, 2017  9:00 AM CDT   Level 7 with SH INFUSION CHAIR 3   [facility] ([facility])    [facility]  [address]  [place] [address]   [phone]            Aug 02, 2017  9:00 AM CDT   Level 7 with SH INFUSION CHAIR 8   [facility] ([facility])    [facility]  [address]  [place] [address]   [phone]            Aug 09, 2017  8:30 AM CDT   Level 7 with SH INFUSION CHAIR 8   [facility] ([facility])    [facility]  [address]  [place] [address]   [phone]            Aug 16, 2017  9:00 AM CDT   Level 7 with SH INFUSION CHAIR 12   [facility] ([facility])    [facility]  [address]  [place] [address]   [phone]            Aug 16, 2017  9:30 AM CDT   Return Visit with Shayne Roberts MD   [facility]    [facility]  [address]  [place] [address]   [phone]             "Sep 21, 2017 10:45 AM CDT   Return Visit with Ramos Rivera MD   [facility] [place] ([facility])    [id number]  [place] [address] [phone]              Who to contact     If you have questions or need follow up information about today's clinic visit or your schedule please contact [facility] directly at [phone].  Normal or non-critical lab and imaging results will be communicated to you by Expanitehart, letter or phone within 4 business days after the clinic has received the results. If you do not hear from us within 7 days, please contact the clinic through Expanitehart or phone. If you have a critical or abnormal lab result, we will notify you by phone as soon as possible.  Submit refill requests through Spinelab or call your pharmacy and they will forward the refill request to us. Please allow 3 business days for your refill to be completed.          Additional Information About Your Visit        Spinelab Information     Spinelab lets you send messages to your doctor, view your test results, renew your prescriptions, schedule appointments and more. To sign up, go to [email or web address] . Click on \"Log in\" on the left side of the screen, which will take you to the Welcome page. Then click on \"Sign up Now\" on the right side of the page.     You will be asked to enter the access code listed below, as well as some personal information. Please follow the directions to create your username and password.     Your access code is: [email or web address]  Expires: 2017  2:33 PM     Your access code will  in 90 days. If you need help or a new code, please call your [place] clinic or [phone].        Care EveryWhere ID     This is your Care EveryWhere ID. This could be used by other organizations to access your [place] medical records  [id number]        Your Vitals Were     Temperature Respirations Height BMI (Body " "Mass Index)          96.2  F (35.7  C) 16 1.676 m (5' 5.98\") 21.67 kg/m2         Blood Pressure from Last 3 Encounters:   07/19/17 152/77   07/19/17 136/72   07/12/17 128/61    Weight from Last 3 Encounters:   07/19/17 60.9 kg (134 lb 3.2 oz)   07/19/17 60.9 kg (134 lb 3.2 oz)   07/12/17 62.4 kg (137 lb 9.6 oz)              We Performed the Following     CBC with platelets differential     Creatinine          Where to get your medicines      Some of these will need a paper prescription and others can be bought over the counter.  Ask your nurse if you have questions.     Bring a paper prescription for each of these medications     oxyCODONE 15 MG IR tablet          Primary Care Provider Office Phone # Fax #    Addy Sean Frias [phone]       [facility] [address]  [facility] [address]        Equal Access to Services     [facility] AH: Hadii aad ku hadasho Soomaali, waaxda luqadaha, qaybta kaalmada adeegyada, waxay genoin haycesar dominique . So [facility] [place] [phone].    ATENCIÓN: Si habla español, tiene a barlow disposición servicios gratuitos de asistencia lingüística. [facility] [phone].    We comply with applicable federal civil rights laws and [place] laws. We do not discriminate on the basis of race, color, national origin, age, disability sex, sexual orientation or gender identity.            Thank you!     Thank you for choosing [facility]  for your care. Our goal is always to provide you with excellent care. Hearing back from our patients is one way we can continue to improve our services. Please take a few minutes to complete the written survey that you may receive in the mail after your visit with us. Thank you!             Your Updated Medication List - Protect others around you: Learn how to safely use, store and throw away your medicines at www.disposemymeds.org.          This list is accurate as of: 7/19/17  2:04 PM.  " Always use your most recent med list.                   Brand Name Dispense Instructions for use Diagnosis    acyclovir 400 MG tablet    ZOVIRAX    60 tablet    Take 1 tablet (400 mg) by mouth 2 times daily Viral Prophylaxis.    Multiple myeloma not having achieved remission (H)       ASPIRIN NOT PRESCRIBED    INTENTIONAL    0 each    Antiplatelet medication not prescribed intentionally due to Current anticoagulant therapy (warfarin/enoxaparin)    Paroxysmal atrial fibrillation (H)       CALCIUM CITRATE + PO      Take 2,000 mg by mouth daily 2 tabs        carboxymethylcellulose 0.5 % Soln ophthalmic solution    REFRESH PLUS     1 drop 4 times daily        CLARINEX PO      Take by mouth daily Taking claritin        COMPAZINE PO      Take 10 mg by mouth daily as needed        cycloSPORINE 0.05 % ophthalmic emulsion    RESTASIS     Place 1 drop into both eyes every 12 hours        DAILY MULTIVITAMIN PO      Take 1 tablet by mouth daily.    Routine general medical examination at a health care facility       * dexamethasone 4 MG tablet    DECADRON    28 tablet    Take 20mg (5 tabs) by mouth every week the morning of velcade injection. Then take 1 tab (4mg) by mouth for two days after darzalex infusion.    Multiple myeloma not having achieved remission (H)       * dexamethasone 4 MG tablet    DECADRON    28 tablet    Take 20mg (5 tablets) PO every week on the morning of velcade injection. Then take 1 tablet (4mg) by mouth for two days after darzalex.    Multiple myeloma not having achieved remission (H)       erythromycin ophthalmic ointment    ROMYCIN     Place 1 Application into both eyes At Bedtime        GENTLE STOOL SOFTENER PO      Take 100 mg by mouth daily        lidocaine-prilocaine cream    EMLA    30 g    Apply topically as needed for moderate pain Apply dollop size amount to port site 30-60 min prior to accessing    Multiple myeloma not having achieved remission (H)       LORazepam 0.5 MG tablet    ATIVAN     20 tablet    Take 1 tablet (0.5 mg) by mouth every 8 hours as needed for anxiety    Multiple myeloma not having achieved remission (H)       metoprolol 50 MG 24 hr tablet    TOPROL-XL    180 tablet    Take 1 tablet (50 mg) by mouth 2 times daily    SVT (supraventricular tachycardia) (H), Paroxysmal atrial fibrillation (H)       oxyCODONE 15 MG IR tablet    ROXICODONE    90 tablet    Take 1 tablet (15 mg) by mouth every 8 hours as needed for pain maximum 4 tablet(s) per day    Multiple myeloma not having achieved remission (H)       polyethylene glycol powder    MIRALAX/GLYCOLAX     Take 1 capful by mouth daily as needed    Bilateral leg edema       timolol 0.25 % ophthalmic solution    TIMOPTIC     1 drop every morning        TYLENOL PO      Take 500 mg by mouth every 6 hours as needed for mild pain or fever        UNABLE TO FIND      MEDICATION NAME: Fresh Coat eye drops        VITAMIN D3 PO      Take 1,000 Units by mouth daily        warfarin 4 MG tablet    COUMADIN    120 tablet    Take 6 mg on Mon, Wed, Fri; 4 mg all other days or as directed by INR clinic.    Paroxysmal atrial fibrillation (H), Long-term (current) use of anticoagulants       ZOMETA IV      Inject into the vein every 30 days Every 3 month dosing        * Notice:  This list has 2 medication(s) that are the same as other medications prescribed for you. Read the directions carefully, and ask your doctor or other care provider to review them with you.

## 2017-07-19 NOTE — MR AVS SNAPSHOT
After Visit Summary   7/19/2017    Amira Arreola    MRN: [id number]           Patient Information     Date Of Birth          7/17/1932        Visit Information        Provider Department      7/19/2017 9:30 AM Shayne Roberts MD [facility]        Today's Diagnoses     Multiple myeloma not having achieved remission (H)    -  1      Care Instructions    Continue chemotherapy.  Scheduled/Aida  Follow up in 1 month.   Scheduled/aida      AVS printed & given to patient/Aida          Follow-ups after your visit        Your next 10 appointments already scheduled     Jul 21, 2017 10:00 AM CDT   Anticoagulation Visit with [facility]   [facility]    [address] [place] [id number]   [phone]            Jul 26, 2017  9:00 AM CDT   Level 7 with SH INFUSION CHAIR 3   [facility] ([facility])    [facility]  [address]  [place] [address]   [phone]            Aug 02, 2017  9:00 AM CDT   Level 7 with SH INFUSION CHAIR 8   [facility] ([facility])    [facility]  [address]  [place] [address]   [phone]            Aug 09, 2017  8:30 AM CDT   Level 7 with SH INFUSION CHAIR 8   [facility] ([facility])    [facility] [place] [place]  [address]  [place] [address]   [phone]            Aug 16, 2017  9:00 AM CDT   Level 7 with SH INFUSION CHAIR 12   [facility] ([facility])    [facility] [place] [place]  [address]  [place] [address]   [phone]            Aug 16, 2017  9:30 AM CDT   Return Visit with Shayne Roberts MD   [facility] ([facility])    [facility]  "[facility]  [address]  [place] [address]   [phone]            Sep 21, 2017 10:45 AM CDT   Return Visit with Ramos Rivera MD   [facility] [place] ([facility])    [address]  [place] [address] [phone]              Who to contact     If you have questions or need follow up information about today's clinic visit or your schedule please contact [facility] directly at [phone].  Normal or non-critical lab and imaging results will be communicated to you by M[employer], letter or phone within 4 business days after the clinic has received the results. If you do not hear from us within 7 days, please contact the clinic through Mipso[employer] or phone. If you have a critical or abnormal lab result, we will notify you by phone as soon as possible.  Submit refill requests through [employer] or call your pharmacy and they will forward the refill request to us. Please allow 3 business days for your refill to be completed.          Additional Information About Your Visit        MyChart Information     [employer] lets you send messages to your doctor, view your test results, renew your prescriptions, schedule appointments and more. To sign up, go to [email or web address] . Click on \"Log in\" on the left side of the screen, which will take you to the Welcome page. Then click on \"Sign up Now\" on the right side of the page.     You will be asked to enter the access code listed below, as well as some personal information. Please follow the directions to create your username and password.     Your access code is: [email or web address]  Expires: 2017  2:33 PM     Your access code will  in 90 days. If you need help or a new code, please call your [place] clinic or [phone].        Care EveryWhere ID     This is your Care EveryWhere ID. This could be used by other organizations to access your [place] medical " records  [id number]        Your Vitals Were     Pulse Respirations Pulse Oximetry BMI (Body Mass Index)          76 16 96% 21.67 kg/m2         Blood Pressure from Last 3 Encounters:   07/19/17 152/77   07/12/17 128/61   07/05/17 116/60    Weight from Last 3 Encounters:   07/19/17 60.9 kg (134 lb 3.2 oz)   07/19/17 60.9 kg (134 lb 3.2 oz)   07/12/17 62.4 kg (137 lb 9.6 oz)              Today, you had the following     No orders found for display         Where to get your medicines      Some of these will need a paper prescription and others can be bought over the counter.  Ask your nurse if you have questions.     Bring a paper prescription for each of these medications     oxyCODONE 15 MG IR tablet          Primary Care Provider Office Phone # Fax #    Addy Sean Frias [phone]       [facility] [address]  [facility] [address]        Equal Access to Services     [facility]: Hadii aad ku hadasho Soomaali, waaxda luqadaha, qaybta kaalmada adeegyada, waxay genoin haycesar dominique . So [facility] [phone].    ATENCIÓN: Si habla español, tiene a barlow disposición servicios gratuitos de asistencia lingüística. Llame al [phone].    We comply with applicable federal civil rights laws and [place] laws. We do not discriminate on the basis of race, color, national origin, age, disability sex, sexual orientation or gender identity.            Thank you!     Thank you for choosing [facility]  for your care. Our goal is always to provide you with excellent care. Hearing back from our patients is one way we can continue to improve our services. Please take a few minutes to complete the written survey that you may receive in the mail after your visit with us. Thank you!             Your Updated Medication List - Protect others around you: Learn how to safely use, store and throw away your medicines at www.disposemymeds.org.          This list is accurate  as of: 7/19/17 10:03 AM.  Always use your most recent med list.                   Brand Name Dispense Instructions for use Diagnosis    acyclovir 400 MG tablet    ZOVIRAX    60 tablet    Take 1 tablet (400 mg) by mouth 2 times daily Viral Prophylaxis.    Multiple myeloma not having achieved remission (H)       ASPIRIN NOT PRESCRIBED    INTENTIONAL    0 each    Antiplatelet medication not prescribed intentionally due to Current anticoagulant therapy (warfarin/enoxaparin)    Paroxysmal atrial fibrillation (H)       CALCIUM CITRATE + PO      Take 2,000 mg by mouth daily 2 tabs        carboxymethylcellulose 0.5 % Soln ophthalmic solution    REFRESH PLUS     1 drop 4 times daily        CLARINEX PO      Take by mouth daily Taking claritin        COMPAZINE PO      Take 10 mg by mouth daily as needed        cycloSPORINE 0.05 % ophthalmic emulsion    RESTASIS     Place 1 drop into both eyes every 12 hours        DAILY MULTIVITAMIN PO      Take 1 tablet by mouth daily.    Routine general medical examination at a health care facility       * dexamethasone 4 MG tablet    DECADRON    28 tablet    Take 20mg (5 tabs) by mouth every week the morning of velcade injection. Then take 1 tab (4mg) by mouth for two days after darzalex infusion.    Multiple myeloma not having achieved remission (H)       * dexamethasone 4 MG tablet    DECADRON    28 tablet    Take 20mg (5 tablets) PO every week on the morning of velcade injection. Then take 1 tablet (4mg) by mouth for two days after darzalex.    Multiple myeloma not having achieved remission (H)       erythromycin ophthalmic ointment    ROMYCIN     Place 1 Application into both eyes At Bedtime        GENTLE STOOL SOFTENER PO      Take 100 mg by mouth daily        lidocaine-prilocaine cream    EMLA    30 g    Apply topically as needed for moderate pain Apply dollop size amount to port site 30-60 min prior to accessing    Multiple myeloma not having achieved remission (H)       LORazepam  0.5 MG tablet    ATIVAN    20 tablet    Take 1 tablet (0.5 mg) by mouth every 8 hours as needed for anxiety    Multiple myeloma not having achieved remission (H)       metoprolol 50 MG 24 hr tablet    TOPROL-XL    180 tablet    Take 1 tablet (50 mg) by mouth 2 times daily    SVT (supraventricular tachycardia) (H), Paroxysmal atrial fibrillation (H)       oxyCODONE 15 MG IR tablet    ROXICODONE    90 tablet    Take 1 tablet (15 mg) by mouth every 8 hours as needed for pain maximum 4 tablet(s) per day    Multiple myeloma not having achieved remission (H)       polyethylene glycol powder    MIRALAX/GLYCOLAX     Take 1 capful by mouth daily as needed    Bilateral leg edema       timolol 0.25 % ophthalmic solution    TIMOPTIC     1 drop every morning        TYLENOL PO      Take 500 mg by mouth every 6 hours as needed for mild pain or fever        UNABLE TO FIND      MEDICATION NAME: Fresh Coat eye drops        VITAMIN D3 PO      Take 1,000 Units by mouth daily        warfarin 4 MG tablet    COUMADIN    120 tablet    Take 6 mg on Mon, Wed, Fri; 4 mg all other days or as directed by INR clinic.    Paroxysmal atrial fibrillation (H), Long-term (current) use of anticoagulants       ZOMETA IV      Inject into the vein every 30 days Every 3 month dosing        * Notice:  This list has 2 medication(s) that are the same as other medications prescribed for you. Read the directions carefully, and ask your doctor or other care provider to review them with you.

## 2017-07-19 NOTE — PATIENT INSTRUCTIONS
Continue chemotherapy.  Scheduled/Dorita  Follow up in 1 month.   Scheduled/dorita      AVS printed & given to patient/Dorita

## 2017-07-21 ENCOUNTER — ANTICOAGULATION THERAPY VISIT (OUTPATIENT)
Dept: NURSING | Facility: CLINIC | Age: 82
End: 2017-07-21
Payer: COMMERCIAL

## 2017-07-21 DIAGNOSIS — Z79.01 LONG-TERM (CURRENT) USE OF ANTICOAGULANTS: ICD-10-CM

## 2017-07-21 LAB — INR POINT OF CARE: 2.4 (ref 0.86–1.14)

## 2017-07-21 PROCEDURE — 85610 PROTHROMBIN TIME: CPT | Mod: QW

## 2017-07-21 PROCEDURE — 36416 COLLJ CAPILLARY BLOOD SPEC: CPT

## 2017-07-21 PROCEDURE — 99207 ZZC NO CHARGE NURSE ONLY: CPT

## 2017-07-21 NOTE — MR AVS SNAPSHOT
Amira Arreola   7/21/2017 10:00 AM   Anticoagulation Therapy Visit    Description:  85 year old female   Provider:  [facility]   Department:  Ec Nurse           INR as of 7/21/2017     Today's INR 2.4      Anticoagulation Summary as of 7/21/2017     INR goal 2.0-3.0   Today's INR 2.4   Full instructions 6 mg on Mon, Wed, Fri; 4 mg all other days   Next INR check 8/4/2017    Indications   Long-term (current) use of anticoagulants [Z79.01] [Z79.01]  Atrial fibrillation (H) [I48.91] (Resolved) [I48.91]         Your next Anticoagulation Clinic appointment(s)     Aug 04, 2017  9:45 AM CDT   Anticoagulation Visit with  ANTICOAGULATION CLINIC   [facility] ([address] [address]   [phone]              Contact Numbers     Clinic Number:         July 2017 Details    Sun Mon Tue Wed Thu Fri Sat           1                 2               3               4               5               6               7               8                 9               10               11               12               13               14               15                 16               17               18               19               20               21      6 mg   See details      22      4 mg           23      4 mg         24      6 mg         25      4 mg         26      6 mg         27      4 mg         28      6 mg         29      4 mg           30      4 mg         31      6 mg               Date Details   07/21 This INR check               How to take your warfarin dose     To take:  4 mg Take 1 of the 4 mg tablets.    To take:  6 mg Take 1.5 of the 4 mg tablets.           August 2017 Details    Sun Mon Tue Wed Thu Fri Sat       1      4 mg         2      6 mg         3      4 mg         4            5                 6               7               8               9               10               11               12                  13               14               15               16               17               18               19                 20               21               22               23               24               25               26                 27               28               29               30               31                  Date Details   No additional details    Date of next INR:  8/4/2017         How to take your warfarin dose     To take:  4 mg Take 1 of the 4 mg tablets.    To take:  6 mg Take 1.5 of the 4 mg tablets.

## 2017-07-21 NOTE — PROGRESS NOTES
ANTICOAGULATION FOLLOW-UP CLINIC VISIT    Patient Name:  Amira Arreola  Date:  7/21/2017  Contact Type:  Face to Face    SUBJECTIVE:     Patient Findings     Positives No Problem Findings           OBJECTIVE    INR Protime   Date Value Ref Range Status   07/21/2017 2.4 (A) 0.86 - 1.14 Final       ASSESSMENT / PLAN  INR assessment THER    Recheck INR In: 2 WEEKS    INR Location Clinic      Anticoagulation Summary as of 7/21/2017     INR goal 2.0-3.0   Today's INR 2.4   Maintenance plan 6 mg (4 mg x 1.5) on Mon, Wed, Fri; 4 mg (4 mg x 1) all other days   Full instructions 6 mg on Mon, Wed, Fri; 4 mg all other days   Weekly total 34 mg   No change documented Magy Tidwell RN   Plan last modified Dayana Barrera RN (3/10/2017)   Next INR check 8/4/2017   Target end date Indefinite    Indications   Long-term (current) use of anticoagulants [Z79.01] [Z79.01]  Atrial fibrillation (H) [I48.91] (Resolved) [I48.91]         Anticoagulation Episode Summary     INR check location     Preferred lab     Send INR reminders to [facility]    Comments       Anticoagulation Care Providers     Provider Role Specialty Phone number    Addy Frias MD [facility] [phone]            See the Encounter Report to view Anticoagulation Flowsheet and Dosing Calendar (Go to Encounters tab in chart review, and find the Anticoagulation Therapy Visit)    Patient INR in goal range.  34 mg weekly total and will continue same and follow up in 2 weeks.    Magy Gomez RN

## 2017-07-25 RX ORDER — DIPHENHYDRAMINE HYDROCHLORIDE 50 MG/ML
50 INJECTION INTRAMUSCULAR; INTRAVENOUS
Status: CANCELLED
Start: 2017-08-09

## 2017-07-25 RX ORDER — DIPHENHYDRAMINE HCL 25 MG
50 CAPSULE ORAL ONCE
Status: CANCELLED | OUTPATIENT
Start: 2017-07-26

## 2017-07-25 RX ORDER — HEPARIN SODIUM (PORCINE) LOCK FLUSH IV SOLN 100 UNIT/ML 100 UNIT/ML
5 SOLUTION INTRAVENOUS EVERY 8 HOURS
Status: CANCELLED
Start: 2017-07-26

## 2017-07-25 RX ORDER — EPINEPHRINE 1 MG/ML
0.3 INJECTION INTRAMUSCULAR; INTRAVENOUS; SUBCUTANEOUS EVERY 5 MIN PRN
Status: CANCELLED | OUTPATIENT
Start: 2017-08-16

## 2017-07-25 RX ORDER — MEPERIDINE HYDROCHLORIDE 50 MG/ML
25 INJECTION INTRAMUSCULAR; INTRAVENOUS; SUBCUTANEOUS EVERY 30 MIN PRN
Status: CANCELLED | OUTPATIENT
Start: 2017-08-09

## 2017-07-25 RX ORDER — EPINEPHRINE 0.3 MG/.3ML
0.3 INJECTION SUBCUTANEOUS EVERY 5 MIN PRN
Status: CANCELLED | OUTPATIENT
Start: 2017-07-26

## 2017-07-25 RX ORDER — MEPERIDINE HYDROCHLORIDE 50 MG/ML
25 INJECTION INTRAMUSCULAR; INTRAVENOUS; SUBCUTANEOUS EVERY 30 MIN PRN
Status: CANCELLED | OUTPATIENT
Start: 2017-08-16

## 2017-07-25 RX ORDER — ALBUTEROL SULFATE 0.83 MG/ML
2.5 SOLUTION RESPIRATORY (INHALATION)
Status: CANCELLED | OUTPATIENT
Start: 2017-08-09

## 2017-07-25 RX ORDER — ALBUTEROL SULFATE 90 UG/1
1-2 AEROSOL, METERED RESPIRATORY (INHALATION)
Status: CANCELLED
Start: 2017-08-16

## 2017-07-25 RX ORDER — LORAZEPAM 2 MG/ML
0.5 INJECTION INTRAMUSCULAR EVERY 4 HOURS PRN
Status: CANCELLED
Start: 2017-08-16

## 2017-07-25 RX ORDER — DIPHENHYDRAMINE HYDROCHLORIDE 50 MG/ML
50 INJECTION INTRAMUSCULAR; INTRAVENOUS
Status: CANCELLED
Start: 2017-08-16

## 2017-07-25 RX ORDER — ACETAMINOPHEN 325 MG/1
650 TABLET ORAL ONCE
Status: CANCELLED | OUTPATIENT
Start: 2017-08-09

## 2017-07-25 RX ORDER — HEPARIN SODIUM (PORCINE) LOCK FLUSH IV SOLN 100 UNIT/ML 100 UNIT/ML
5 SOLUTION INTRAVENOUS EVERY 8 HOURS
Status: CANCELLED
Start: 2017-08-16

## 2017-07-25 RX ORDER — METHYLPREDNISOLONE SODIUM SUCCINATE 125 MG/2ML
125 INJECTION, POWDER, LYOPHILIZED, FOR SOLUTION INTRAMUSCULAR; INTRAVENOUS
Status: CANCELLED
Start: 2017-08-09

## 2017-07-25 RX ORDER — EPINEPHRINE 1 MG/ML
0.3 INJECTION INTRAMUSCULAR; INTRAVENOUS; SUBCUTANEOUS EVERY 5 MIN PRN
Status: CANCELLED | OUTPATIENT
Start: 2017-08-02

## 2017-07-25 RX ORDER — ALBUTEROL SULFATE 90 UG/1
1-2 AEROSOL, METERED RESPIRATORY (INHALATION)
Status: CANCELLED
Start: 2017-08-02

## 2017-07-25 RX ORDER — EPINEPHRINE 0.3 MG/.3ML
0.3 INJECTION SUBCUTANEOUS EVERY 5 MIN PRN
Status: CANCELLED | OUTPATIENT
Start: 2017-08-16

## 2017-07-25 RX ORDER — METHYLPREDNISOLONE SODIUM SUCCINATE 125 MG/2ML
125 INJECTION, POWDER, LYOPHILIZED, FOR SOLUTION INTRAMUSCULAR; INTRAVENOUS
Status: CANCELLED
Start: 2017-07-26

## 2017-07-25 RX ORDER — SODIUM CHLORIDE 9 MG/ML
1000 INJECTION, SOLUTION INTRAVENOUS CONTINUOUS PRN
Status: CANCELLED
Start: 2017-08-02

## 2017-07-25 RX ORDER — ACETAMINOPHEN 325 MG/1
650 TABLET ORAL ONCE
Status: CANCELLED | OUTPATIENT
Start: 2017-07-26

## 2017-07-25 RX ORDER — DIPHENHYDRAMINE HCL 25 MG
50 CAPSULE ORAL ONCE
Status: CANCELLED | OUTPATIENT
Start: 2017-08-09

## 2017-07-25 RX ORDER — ALBUTEROL SULFATE 90 UG/1
1-2 AEROSOL, METERED RESPIRATORY (INHALATION)
Status: CANCELLED
Start: 2017-07-26

## 2017-07-25 RX ORDER — EPINEPHRINE 0.3 MG/.3ML
0.3 INJECTION SUBCUTANEOUS EVERY 5 MIN PRN
Status: CANCELLED | OUTPATIENT
Start: 2017-08-09

## 2017-07-25 RX ORDER — SODIUM CHLORIDE 9 MG/ML
1000 INJECTION, SOLUTION INTRAVENOUS CONTINUOUS PRN
Status: CANCELLED
Start: 2017-07-26

## 2017-07-25 RX ORDER — MEPERIDINE HYDROCHLORIDE 50 MG/ML
25 INJECTION INTRAMUSCULAR; INTRAVENOUS; SUBCUTANEOUS EVERY 30 MIN PRN
Status: CANCELLED | OUTPATIENT
Start: 2017-08-02

## 2017-07-25 RX ORDER — ALBUTEROL SULFATE 90 UG/1
1-2 AEROSOL, METERED RESPIRATORY (INHALATION)
Status: CANCELLED
Start: 2017-08-09

## 2017-07-25 RX ORDER — LORAZEPAM 2 MG/ML
0.5 INJECTION INTRAMUSCULAR EVERY 4 HOURS PRN
Status: CANCELLED
Start: 2017-08-02

## 2017-07-25 RX ORDER — EPINEPHRINE 0.3 MG/.3ML
0.3 INJECTION SUBCUTANEOUS EVERY 5 MIN PRN
Status: CANCELLED | OUTPATIENT
Start: 2017-08-02

## 2017-07-25 RX ORDER — SODIUM CHLORIDE 9 MG/ML
1000 INJECTION, SOLUTION INTRAVENOUS CONTINUOUS PRN
Status: CANCELLED
Start: 2017-08-16

## 2017-07-25 RX ORDER — DIPHENHYDRAMINE HYDROCHLORIDE 50 MG/ML
50 INJECTION INTRAMUSCULAR; INTRAVENOUS
Status: CANCELLED
Start: 2017-07-26

## 2017-07-25 RX ORDER — SODIUM CHLORIDE 9 MG/ML
1000 INJECTION, SOLUTION INTRAVENOUS CONTINUOUS PRN
Status: CANCELLED
Start: 2017-08-09

## 2017-07-25 RX ORDER — DIPHENHYDRAMINE HYDROCHLORIDE 50 MG/ML
50 INJECTION INTRAMUSCULAR; INTRAVENOUS
Status: CANCELLED
Start: 2017-08-02

## 2017-07-25 RX ORDER — HEPARIN SODIUM (PORCINE) LOCK FLUSH IV SOLN 100 UNIT/ML 100 UNIT/ML
5 SOLUTION INTRAVENOUS EVERY 8 HOURS
Status: CANCELLED
Start: 2017-08-02

## 2017-07-25 RX ORDER — HEPARIN SODIUM (PORCINE) LOCK FLUSH IV SOLN 100 UNIT/ML 100 UNIT/ML
5 SOLUTION INTRAVENOUS EVERY 8 HOURS
Status: CANCELLED
Start: 2017-08-09

## 2017-07-25 RX ORDER — ALBUTEROL SULFATE 0.83 MG/ML
2.5 SOLUTION RESPIRATORY (INHALATION)
Status: CANCELLED | OUTPATIENT
Start: 2017-08-16

## 2017-07-25 RX ORDER — EPINEPHRINE 1 MG/ML
0.3 INJECTION INTRAMUSCULAR; INTRAVENOUS; SUBCUTANEOUS EVERY 5 MIN PRN
Status: CANCELLED | OUTPATIENT
Start: 2017-07-26

## 2017-07-25 RX ORDER — ALBUTEROL SULFATE 0.83 MG/ML
2.5 SOLUTION RESPIRATORY (INHALATION)
Status: CANCELLED | OUTPATIENT
Start: 2017-07-26

## 2017-07-25 RX ORDER — METHYLPREDNISOLONE SODIUM SUCCINATE 125 MG/2ML
125 INJECTION, POWDER, LYOPHILIZED, FOR SOLUTION INTRAMUSCULAR; INTRAVENOUS
Status: CANCELLED
Start: 2017-08-16

## 2017-07-25 RX ORDER — EPINEPHRINE 1 MG/ML
0.3 INJECTION INTRAMUSCULAR; INTRAVENOUS; SUBCUTANEOUS EVERY 5 MIN PRN
Status: CANCELLED | OUTPATIENT
Start: 2017-08-09

## 2017-07-25 RX ORDER — LORAZEPAM 2 MG/ML
0.5 INJECTION INTRAMUSCULAR EVERY 4 HOURS PRN
Status: CANCELLED
Start: 2017-08-09

## 2017-07-25 RX ORDER — LORAZEPAM 2 MG/ML
0.5 INJECTION INTRAMUSCULAR EVERY 4 HOURS PRN
Status: CANCELLED
Start: 2017-07-26

## 2017-07-25 RX ORDER — ALBUTEROL SULFATE 0.83 MG/ML
2.5 SOLUTION RESPIRATORY (INHALATION)
Status: CANCELLED | OUTPATIENT
Start: 2017-08-02

## 2017-07-25 RX ORDER — METHYLPREDNISOLONE SODIUM SUCCINATE 125 MG/2ML
125 INJECTION, POWDER, LYOPHILIZED, FOR SOLUTION INTRAMUSCULAR; INTRAVENOUS
Status: CANCELLED
Start: 2017-08-02

## 2017-07-25 RX ORDER — MEPERIDINE HYDROCHLORIDE 50 MG/ML
25 INJECTION INTRAMUSCULAR; INTRAVENOUS; SUBCUTANEOUS EVERY 30 MIN PRN
Status: CANCELLED | OUTPATIENT
Start: 2017-07-26

## 2017-07-26 ENCOUNTER — HOSPITAL ENCOUNTER (OUTPATIENT)
Facility: CLINIC | Age: 82
Setting detail: SPECIMEN
Discharge: HOME | End: 2017-07-26
Attending: INTERNAL MEDICINE | Admitting: INTERNAL MEDICINE
Payer: MEDICARE

## 2017-07-26 ENCOUNTER — INFUSION THERAPY VISIT (OUTPATIENT)
Dept: INFUSION THERAPY | Facility: CLINIC | Age: 82
End: 2017-07-26
Attending: INTERNAL MEDICINE
Payer: MEDICARE

## 2017-07-26 VITALS
WEIGHT: 130.8 LBS | DIASTOLIC BLOOD PRESSURE: 70 MMHG | HEIGHT: 66 IN | SYSTOLIC BLOOD PRESSURE: 128 MMHG | BODY MASS INDEX: 21.02 KG/M2 | TEMPERATURE: 95.5 F | RESPIRATION RATE: 18 BRPM | HEART RATE: 74 BPM

## 2017-07-26 DIAGNOSIS — C90.00 MULTIPLE MYELOMA NOT HAVING ACHIEVED REMISSION (H): Primary | ICD-10-CM

## 2017-07-26 LAB
ALBUMIN SERPL-MCNC: 3.4 G/DL (ref 3.4–5)
ALP SERPL-CCNC: 43 U/L (ref 40–150)
ALT SERPL W P-5'-P-CCNC: 28 U/L (ref 0–50)
AST SERPL W P-5'-P-CCNC: 26 U/L (ref 0–45)
BASOPHILS # BLD AUTO: 0 10E9/L (ref 0–0.2)
BASOPHILS NFR BLD AUTO: 0 %
BILIRUB DIRECT SERPL-MCNC: 0.2 MG/DL (ref 0–0.2)
BILIRUB SERPL-MCNC: 0.7 MG/DL (ref 0.2–1.3)
DIFFERENTIAL METHOD BLD: ABNORMAL
EOSINOPHIL # BLD AUTO: 0.1 10E9/L (ref 0–0.7)
EOSINOPHIL NFR BLD AUTO: 0.9 %
ERYTHROCYTE [DISTWIDTH] IN BLOOD BY AUTOMATED COUNT: 14.6 % (ref 10–15)
HCT VFR BLD AUTO: 35.6 % (ref 35–47)
HGB BLD-MCNC: 12.2 G/DL (ref 11.7–15.7)
IMM GRANULOCYTES # BLD: 0 10E9/L (ref 0–0.4)
IMM GRANULOCYTES NFR BLD: 0.2 %
LYMPHOCYTES # BLD AUTO: 0.6 10E9/L (ref 0.8–5.3)
LYMPHOCYTES NFR BLD AUTO: 8.4 %
MCH RBC QN AUTO: 34.3 PG (ref 26.5–33)
MCHC RBC AUTO-ENTMCNC: 34.3 G/DL (ref 31.5–36.5)
MCV RBC AUTO: 100 FL (ref 78–100)
MONOCYTES # BLD AUTO: 0.3 10E9/L (ref 0–1.3)
MONOCYTES NFR BLD AUTO: 4.6 %
NEUTROPHILS # BLD AUTO: 5.6 10E9/L (ref 1.6–8.3)
NEUTROPHILS NFR BLD AUTO: 85.9 %
NRBC # BLD AUTO: 0 10*3/UL
NRBC BLD AUTO-RTO: 0 /100
PLATELET # BLD AUTO: 147 10E9/L (ref 150–450)
PROT SERPL-MCNC: 5.8 G/DL (ref 6.8–8.8)
RBC # BLD AUTO: 3.56 10E12/L (ref 3.8–5.2)
WBC # BLD AUTO: 6.6 10E9/L (ref 4–11)

## 2017-07-26 PROCEDURE — 83883 ASSAY NEPHELOMETRY NOT SPEC: CPT | Performed by: INTERNAL MEDICINE

## 2017-07-26 PROCEDURE — 84165 PROTEIN E-PHORESIS SERUM: CPT | Performed by: INTERNAL MEDICINE

## 2017-07-26 PROCEDURE — 96367 TX/PROPH/DG ADDL SEQ IV INF: CPT

## 2017-07-26 PROCEDURE — 25000128 H RX IP 250 OP 636: Performed by: INTERNAL MEDICINE

## 2017-07-26 PROCEDURE — 00000402 ZZHCL STATISTIC TOTAL PROTEIN: Performed by: INTERNAL MEDICINE

## 2017-07-26 PROCEDURE — 96401 CHEMO ANTI-NEOPL SQ/IM: CPT

## 2017-07-26 PROCEDURE — 96415 CHEMO IV INFUSION ADDL HR: CPT

## 2017-07-26 PROCEDURE — 80076 HEPATIC FUNCTION PANEL: CPT | Performed by: INTERNAL MEDICINE

## 2017-07-26 PROCEDURE — 96413 CHEMO IV INFUSION 1 HR: CPT

## 2017-07-26 PROCEDURE — A9270 NON-COVERED ITEM OR SERVICE: HCPCS | Mod: GY | Performed by: INTERNAL MEDICINE

## 2017-07-26 PROCEDURE — 85025 COMPLETE CBC W/AUTO DIFF WBC: CPT | Performed by: INTERNAL MEDICINE

## 2017-07-26 PROCEDURE — 25000132 ZZH RX MED GY IP 250 OP 250 PS 637: Mod: GY | Performed by: INTERNAL MEDICINE

## 2017-07-26 RX ORDER — DEXAMETHASONE 4 MG/1
TABLET ORAL
Qty: 28 TABLET | Refills: 0 | Status: SHIPPED | OUTPATIENT
Start: 2017-07-26 | End: 2017-12-06

## 2017-07-26 RX ORDER — ACETAMINOPHEN 325 MG/1
650 TABLET ORAL ONCE
Status: COMPLETED | OUTPATIENT
Start: 2017-07-26 | End: 2017-07-26

## 2017-07-26 RX ORDER — HEPARIN SODIUM (PORCINE) LOCK FLUSH IV SOLN 100 UNIT/ML 100 UNIT/ML
5 SOLUTION INTRAVENOUS EVERY 8 HOURS
Status: DISCONTINUED | OUTPATIENT
Start: 2017-07-26 | End: 2017-07-26 | Stop reason: HOSPADM

## 2017-07-26 RX ADMIN — DEXAMETHASONE SODIUM PHOSPHATE 12 MG: 10 INJECTION, SOLUTION INTRAMUSCULAR; INTRAVENOUS at 10:57

## 2017-07-26 RX ADMIN — DIPHENHYDRAMINE HYDROCHLORIDE 50 MG: 50 INJECTION, SOLUTION INTRAMUSCULAR; INTRAVENOUS at 10:36

## 2017-07-26 RX ADMIN — BORTEZOMIB 2.2 MG: 3.5 INJECTION, POWDER, LYOPHILIZED, FOR SOLUTION INTRAVENOUS; SUBCUTANEOUS at 14:42

## 2017-07-26 RX ADMIN — SODIUM CHLORIDE 250 ML: 9 INJECTION, SOLUTION INTRAVENOUS at 10:26

## 2017-07-26 RX ADMIN — DARATUMUMAB 1000 MG: 100 INJECTION, SOLUTION, CONCENTRATE INTRAVENOUS at 11:18

## 2017-07-26 RX ADMIN — SODIUM CHLORIDE, PRESERVATIVE FREE 5 ML: 5 INJECTION INTRAVENOUS at 14:46

## 2017-07-26 RX ADMIN — ACETAMINOPHEN 650 MG: 325 TABLET ORAL at 10:28

## 2017-07-26 NOTE — PROGRESS NOTES
Infusion Nursing Note:  Amira Arreola presents today for velcade daratumumab.    Patient seen by provider today: No   present during visit today: Not Applicable.    Note: N/A.    Intravenous Access:  Implanted Port.    Treatment Conditions:  Lab Results   Component Value Date    HGB 12.2 07/26/2017     Lab Results   Component Value Date    WBC 6.6 07/26/2017      Lab Results   Component Value Date    ANEU 5.6 07/26/2017     Lab Results   Component Value Date     07/26/2017     Hepatic panel WNL    Results reviewed, labs MET treatment parameters, ok to proceed with treatment.        Post Infusion Assessment:  Patient tolerated infusion without incident.  Site patent and intact, free from redness, edema or discomfort.  No evidence of extravasations.  Access discontinued per protocol.8/    Discharge Plan:   Patient and/or family verbalized understanding of discharge instructions and all questions answered.  AVS to patient via [employer].  Patient will return 8/2/17 for next appointment.   Patient discharged in stable condition accompanied by: self.  Departure Mode: Ambulatory.    Mera Johnson RN

## 2017-07-26 NOTE — MR AVS SNAPSHOT
After Visit Summary   7/26/2017    Amira Arreola    MRN: [id number]           Patient Information     Date Of Birth          7/17/1932        Visit Information        Provider Department      7/26/2017 9:00 AM  INFUSION CHAIR 3 [facility]        Today's Diagnoses     Multiple myeloma not having achieved remission (H)    -  1       Follow-ups after your visit        Your next 10 appointments already scheduled     Aug 02, 2017  9:00 AM CDT   Level 7 with  INFUSION CHAIR 8   [facility] ([facility])    [facility] [place] [id number]  [place] [address]   [phone]            Aug 04, 2017  9:45 AM CDT   Anticoagulation Visit with EC ANTICOAGULATION CLINIC   [facility] ([address] [address]   [phone]            Aug 09, 2017  8:30 AM CDT   Level 7 with  INFUSION CHAIR 8   [facility] ([facility])    [facility]  [address]  [place] [address]   [phone]            Aug 16, 2017  9:00 AM CDT   Level 7 with  INFUSION CHAIR 12   [facility] ([facility])    [facility]  [address]  [place] [address]   [phone]            Aug 16, 2017  9:30 AM CDT   Return Visit with Shayne Roberts MD   [facility] ([facility])    [facility]  [address]  [place] [address]   [phone]            Sep 21, 2017 10:45 AM CDT   Return Visit with Ramos Rivera MD   [facility] ([facility])    [address]  [place] [address] [phone]              Who to contact     If you have questions or need follow up  "information about today's clinic visit or your schedule please contact [facility] CANC[facility] AND [facility] directly at [phone].  Normal or non-critical lab and imaging results will be communicated to you by [employer], letter or phone within 4 business days after the clinic has received the results. If you do not hear from us within 7 days, please contact the clinic through [employer] or phone. If you have a critical or abnormal lab result, we will notify you by phone as soon as possible.  Submit refill requests through [employer] or call your pharmacy and they will forward the refill request to us. Please allow 3 business days for your refill to be completed.          Additional Information About Your Visit        [employer] Information     [employer] lets you send messages to your doctor, view your test results, renew your prescriptions, schedule appointments and more. To sign up, go to [email or web address] . Click on \"Log in\" on the left side of the screen, which will take you to the Welcome page. Then click on \"Sign up Now\" on the right side of the page.     You will be asked to enter the access code listed below, as well as some personal information. Please follow the directions to create your username and password.     Your access code is: [email or web address]  Expires: 2017  2:33 PM     Your access code will  in 90 days. If you need help or a new code, please call your [place] clinic or [phone].        Care EveryWhere ID     This is your Care EveryWhere ID. This could be used by other organizations to access your [place] medical records  [id number]        Your Vitals Were     Pulse Temperature Respirations Height BMI (Body Mass Index)       74 95.5  F (35.3  C) (Oral) 18 1.676 m (5' 6\") 21.11 kg/m2        Blood Pressure from Last 3 Encounters:   17 128/70   17 152/77   17 135/73    Weight from Last 3 Encounters:   17 59.3 kg (130 lb 12.8 oz)   17 60.9 kg (134 lb 3.2 " oz)   07/19/17 60.9 kg (134 lb 3.2 oz)              We Performed the Following     CBC with platelets differential     Hepatic panel     Kappa and lambda light chain     Protein electrophoresis          Where to get your medicines      These medications were sent to [id number] - [place], [place] - [address] AT [facility] [address]  [address], [place] [address]     Phone:  [phone]     dexamethasone 4 MG tablet          Primary Care Provider Office Phone # Fax #    Addy Frias [phone]       [facility] [address]  [place] [address]        Equal Access to Services     SARA ZELAYA : Hadii liane ku hadasho Soomaali, waaxda luqadaha, qaybta kaalmada adeegyada, hung dominique . So [facility] [phone].    ATENCIÓN: Si habla español, tiene a barlow disposición servicios gratuitos de asistencia lingüística. Llame al [phone].    We comply with applicable federal civil rights laws and [place] laws. We do not discriminate on the basis of race, color, national origin, age, disability sex, sexual orientation or gender identity.            Thank you!     Thank you for choosing [facility] AND INFUSION CENTER  for your care. Our goal is always to provide you with excellent care. Hearing back from our patients is one way we can continue to improve our services. Please take a few minutes to complete the written survey that you may receive in the mail after your visit with us. Thank you!             Your Updated Medication List - Protect others around you: Learn how to safely use, store and throw away your medicines at www.disposemymeds.org.          This list is accurate as of: 7/26/17  2:53 PM.  Always use your most recent med list.                   Brand Name Dispense Instructions for use Diagnosis    acyclovir 400 MG tablet    ZOVIRAX    60 tablet    Take 1 tablet (400 mg) by mouth 2 times daily Viral  Prophylaxis.    Multiple myeloma not having achieved remission (H)       ASPIRIN NOT PRESCRIBED    INTENTIONAL    0 each    Antiplatelet medication not prescribed intentionally due to Current anticoagulant therapy (warfarin/enoxaparin)    Paroxysmal atrial fibrillation (H)       CALCIUM CITRATE + PO      Take 2,000 mg by mouth daily 2 tabs        carboxymethylcellulose 0.5 % Soln ophthalmic solution    REFRESH PLUS     1 drop 4 times daily        CLARINEX PO      Take by mouth daily Taking claritin        COMPAZINE PO      Take 10 mg by mouth daily as needed        cycloSPORINE 0.05 % ophthalmic emulsion    RESTASIS     Place 1 drop into both eyes every 12 hours        DAILY MULTIVITAMIN PO      Take 1 tablet by mouth daily.    Routine general medical examination at a health care facility       * dexamethasone 4 MG tablet    DECADRON    28 tablet    Take 20mg (5 tabs) by mouth every week the morning of velcade injection. Then take 1 tab (4mg) by mouth for two days after darzalex infusion.    Multiple myeloma not having achieved remission (H)       * dexamethasone 4 MG tablet    DECADRON    28 tablet    Take 20mg (5 tablets) PO every week on the morning of velcade injection. Then take 1 tablet (4mg) by mouth for two days after darzalex.    Multiple myeloma not having achieved remission (H)       erythromycin ophthalmic ointment    ROMYCIN     Place 1 Application into both eyes At Bedtime        GENTLE STOOL SOFTENER PO      Take 100 mg by mouth daily        lidocaine-prilocaine cream    EMLA    30 g    Apply topically as needed for moderate pain Apply dollop size amount to port site 30-60 min prior to accessing    Multiple myeloma not having achieved remission (H)       LORazepam 0.5 MG tablet    ATIVAN    20 tablet    Take 1 tablet (0.5 mg) by mouth every 8 hours as needed for anxiety    Multiple myeloma not having achieved remission (H)       metoprolol 50 MG 24 hr tablet    TOPROL-XL    180 tablet    Take 1 tablet  (50 mg) by mouth 2 times daily    SVT (supraventricular tachycardia) (H), Paroxysmal atrial fibrillation (H)       oxyCODONE 15 MG IR tablet    ROXICODONE    90 tablet    Take 1 tablet (15 mg) by mouth every 8 hours as needed for pain maximum 4 tablet(s) per day    Multiple myeloma not having achieved remission (H)       polyethylene glycol powder    MIRALAX/GLYCOLAX     Take 1 capful by mouth daily as needed    Bilateral leg edema       timolol 0.25 % ophthalmic solution    TIMOPTIC     1 drop every morning        TYLENOL PO      Take 500 mg by mouth every 6 hours as needed for mild pain or fever        UNABLE TO FIND      MEDICATION NAME: Fresh Coat eye drops        VITAMIN D3 PO      Take 1,000 Units by mouth daily        warfarin 4 MG tablet    COUMADIN    120 tablet    Take 6 mg on Mon, Wed, Fri; 4 mg all other days or as directed by INR clinic.    Paroxysmal atrial fibrillation (H), Long-term (current) use of anticoagulants       ZOMETA IV      Inject into the vein every 30 days Every 3 month dosing        * Notice:  This list has 2 medication(s) that are the same as other medications prescribed for you. Read the directions carefully, and ask your doctor or other care provider to review them with you.

## 2017-07-27 LAB
ALBUMIN SERPL ELPH-MCNC: 3.7 G/DL (ref 3.7–5.1)
ALPHA1 GLOB SERPL ELPH-MCNC: 0.3 G/DL (ref 0.2–0.4)
ALPHA2 GLOB SERPL ELPH-MCNC: 0.7 G/DL (ref 0.5–0.9)
B-GLOBULIN SERPL ELPH-MCNC: 0.6 G/DL (ref 0.6–1)
ELP INTERPRETATION: ABNORMAL
GAMMA GLOB SERPL ELPH-MCNC: 0.3 G/DL (ref 0.7–1.6)
KAPPA LC UR-MCNC: 13.1 MG/DL (ref 0.33–1.94)
KAPPA LC/LAMBDA SER: 11.8 {RATIO} (ref 0.26–1.65)
LAMBDA LC SERPL-MCNC: 1.11 MG/DL (ref 0.57–2.63)
M PROTEIN SERPL ELPH-MCNC: 0.1 G/DL

## 2017-07-31 DIAGNOSIS — Z79.01 LONG-TERM (CURRENT) USE OF ANTICOAGULANTS: ICD-10-CM

## 2017-07-31 DIAGNOSIS — I48.0 PAROXYSMAL ATRIAL FIBRILLATION (H): ICD-10-CM

## 2017-07-31 NOTE — TELEPHONE ENCOUNTER
warfarin (COUMADIN) 4 MG tablet      Last Written Prescription Date: 5/4/2017  Last Fill Qty: 120, # refills: 0  Last Office Visit with [facility] or [facility] prescribing provider: 12/22/2016  Next 5 appointments (look out 90 days)     Aug 16, 2017  9:30 AM CDT   Return Visit with Shayne Roberts MD   [facility] ([facility])    [facility]  [address]  [facility] [address] [phone]            Sep 21, 2017 10:45 AM CDT   Return Visit with Ramos Rivera MD   [facility] PHYSICIANS [facility] [place] ([facility] Clinics)    [address]  [place] [address] [phone]                   Date and Result of Last PT/INR:   Lab Results   Component Value Date    INR 2.4 07/21/2017    INR 2.2 07/07/2017    INR 1.05 05/30/2017

## 2017-08-01 RX ORDER — WARFARIN SODIUM 4 MG/1
TABLET ORAL
Qty: 110 TABLET | Refills: 0 | Status: SHIPPED | OUTPATIENT
Start: 2017-08-01 | End: 2017-11-04

## 2017-08-02 ENCOUNTER — HOSPITAL ENCOUNTER (OUTPATIENT)
Facility: CLINIC | Age: 82
Setting detail: SPECIMEN
Discharge: HOME | End: 2017-08-02
Attending: INTERNAL MEDICINE | Admitting: INTERNAL MEDICINE
Payer: MEDICARE

## 2017-08-02 ENCOUNTER — INFUSION THERAPY VISIT (OUTPATIENT)
Dept: INFUSION THERAPY | Facility: CLINIC | Age: 82
End: 2017-08-02
Attending: INTERNAL MEDICINE
Payer: MEDICARE

## 2017-08-02 VITALS
DIASTOLIC BLOOD PRESSURE: 83 MMHG | HEIGHT: 66 IN | OXYGEN SATURATION: 98 % | TEMPERATURE: 98.4 F | HEART RATE: 78 BPM | BODY MASS INDEX: 21.21 KG/M2 | WEIGHT: 132 LBS | RESPIRATION RATE: 16 BRPM | SYSTOLIC BLOOD PRESSURE: 143 MMHG

## 2017-08-02 DIAGNOSIS — C90.00 MULTIPLE MYELOMA NOT HAVING ACHIEVED REMISSION (H): Primary | ICD-10-CM

## 2017-08-02 LAB
BASOPHILS # BLD AUTO: 0 10E9/L (ref 0–0.2)
BASOPHILS NFR BLD AUTO: 0 %
DIFFERENTIAL METHOD BLD: ABNORMAL
EOSINOPHIL # BLD AUTO: 0 10E9/L (ref 0–0.7)
EOSINOPHIL NFR BLD AUTO: 0.2 %
ERYTHROCYTE [DISTWIDTH] IN BLOOD BY AUTOMATED COUNT: 14.4 % (ref 10–15)
HCT VFR BLD AUTO: 37.1 % (ref 35–47)
HGB BLD-MCNC: 12.6 G/DL (ref 11.7–15.7)
IMM GRANULOCYTES # BLD: 0 10E9/L (ref 0–0.4)
IMM GRANULOCYTES NFR BLD: 0.2 %
LYMPHOCYTES # BLD AUTO: 0.5 10E9/L (ref 0.8–5.3)
LYMPHOCYTES NFR BLD AUTO: 9.1 %
MCH RBC QN AUTO: 33.8 PG (ref 26.5–33)
MCHC RBC AUTO-ENTMCNC: 34 G/DL (ref 31.5–36.5)
MCV RBC AUTO: 100 FL (ref 78–100)
MONOCYTES # BLD AUTO: 0.1 10E9/L (ref 0–1.3)
MONOCYTES NFR BLD AUTO: 2.6 %
NEUTROPHILS # BLD AUTO: 4.7 10E9/L (ref 1.6–8.3)
NEUTROPHILS NFR BLD AUTO: 87.9 %
NRBC # BLD AUTO: 0 10*3/UL
NRBC BLD AUTO-RTO: 0 /100
PLATELET # BLD AUTO: 134 10E9/L (ref 150–450)
RBC # BLD AUTO: 3.73 10E12/L (ref 3.8–5.2)
WBC # BLD AUTO: 5.4 10E9/L (ref 4–11)

## 2017-08-02 PROCEDURE — 96401 CHEMO ANTI-NEOPL SQ/IM: CPT

## 2017-08-02 PROCEDURE — 85025 COMPLETE CBC W/AUTO DIFF WBC: CPT | Performed by: INTERNAL MEDICINE

## 2017-08-02 PROCEDURE — 25000128 H RX IP 250 OP 636: Mod: JW | Performed by: INTERNAL MEDICINE

## 2017-08-02 RX ORDER — HEPARIN SODIUM (PORCINE) LOCK FLUSH IV SOLN 100 UNIT/ML 100 UNIT/ML
5 SOLUTION INTRAVENOUS EVERY 8 HOURS
Status: DISCONTINUED | OUTPATIENT
Start: 2017-08-02 | End: 2017-08-02 | Stop reason: HOSPADM

## 2017-08-02 RX ADMIN — BORTEZOMIB 2.2 MG: 3.5 INJECTION, POWDER, LYOPHILIZED, FOR SOLUTION INTRAVENOUS; SUBCUTANEOUS at 10:25

## 2017-08-02 RX ADMIN — SODIUM CHLORIDE, PRESERVATIVE FREE 5 ML: 5 INJECTION INTRAVENOUS at 10:35

## 2017-08-02 ASSESSMENT — PAIN SCALES - GENERAL: PAINLEVEL: NO PAIN (0)

## 2017-08-02 NOTE — PROGRESS NOTES
Infusion Nursing Note:  Amira Arreola presents today for C3D8 velcade.    Patient seen by provider today: No   present during visit today: Not Applicable.    Note: N/A.    Intravenous Access:  Labs drawn without difficulty.  Implanted Port.    Treatment Conditions:  Lab Results   Component Value Date    HGB 12.6 08/02/2017     Lab Results   Component Value Date    WBC 5.4 08/02/2017      Lab Results   Component Value Date    ANEU 4.7 08/02/2017     Lab Results   Component Value Date     08/02/2017      Results reviewed, labs MET treatment parameters, ok to proceed with treatment.        Post Infusion Assessment:  Patient tolerated injection without incident.  Blood return noted pre and post infusion.  Site patent and intact, free from redness, edema or discomfort.  No evidence of extravasations.  Access discontinued per protocol.    Discharge Plan:   Patient declined prescription refills.  Discharge instructions reviewed with: Patient.  Patient and/or family verbalized understanding of discharge instructions and all questions answered.  Copy of AVS reviewed with patient and/or family.  Patient will return 8/9/17 for next appointment.  Patient discharged in stable condition accompanied by: self.  Departure Mode: Ambulatory.    Fanny Bob RN

## 2017-08-02 NOTE — MR AVS SNAPSHOT
After Visit Summary   8/2/2017    Amira Arreola    MRN: [id number]           Patient Information     Date Of Birth          7/17/1932        Visit Information        Provider Department      8/2/2017 9:00 AM  INFUSION CHAIR 8 [facility]        Today's Diagnoses     Multiple myeloma not having achieved remission (H)    -  1       Follow-ups after your visit        Your next 10 appointments already scheduled     Aug 04, 2017  9:45 AM CDT   Anticoagulation Visit with EC ANTICOAGULATION CLINIC   [facility] ([address] [address]   [phone]            Aug 09, 2017  8:30 AM CDT   Level 7 with  INFUSION CHAIR 8   [facility] ([facility])    [facility] [place] [address]  [place] [address]   [phone]            Aug 16, 2017  9:00 AM CDT   Level 7 with  INFUSION CHAIR 12   [facility] ([facility])    [facility]  [address]  [place] [address] [phone]            Aug 16, 2017  9:30 AM CDT   Return Visit with Shayne Roberts MD   [facility] ([facility])    [facility]  [address]  [place] [address] [phone]            Sep 21, 2017 10:45 AM CDT   Return Visit with Ramos Rivera MD   [facility] [place] ([facility] Clin[facility])    [address] [place] [address] [address]  [place] [address] [phone]              Who to contact     If you have questions or need follow up information about today's clinic visit or your schedule please contact [facility] INF[facility]ION C[facility] directly at [phone].  Normal or non-critical lab and imaging results will be communicated to you by MyChart, letter or phone within  "4 business days after the clinic has received the results. If you do not hear from us within 7 days, please contact the clinic through [employer] or phone. If you have a critical or abnormal lab result, we will notify you by phone as soon as possible.  Submit refill requests through [employer] or call your pharmacy and they will forward the refill request to us. Please allow 3 business days for your refill to be completed.          Additional Information About Your Visit        [employer] Information     [employer] lets you send messages to your doctor, view your test results, renew your prescriptions, schedule appointments and more. To sign up, go to [email or web address] . Click on \"Log in\" on the left side of the screen, which will take you to the Welcome page. Then click on \"Sign up Now\" on the right side of the page.     You will be asked to enter the access code listed below, as well as some personal information. Please follow the directions to create your username and password.     Your access code is: [email or web address]  Expires: 2017  2:33 PM     Your access code will  in 90 days. If you need help or a new code, please call your [place] clinic or [phone].        Care EveryWhere ID     This is your Care EveryWhere ID. This could be used by other organizations to access your [place] medical records  [id number]        Your Vitals Were     Pulse Temperature Respirations Height Pulse Oximetry BMI (Body Mass Index)    78 98.4  F (36.9  C) (Oral) 16 1.676 m (5' 6\") 98% 21.31 kg/m2       Blood Pressure from Last 3 Encounters:   17 143/83   17 128/70   17 152/77    Weight from Last 3 Encounters:   17 59.9 kg (132 lb)   17 59.3 kg (130 lb 12.8 oz)   17 60.9 kg (134 lb 3.2 oz)              We Performed the Following     CBC with platelets differential        Primary Care Provider Office Phone # Fax #    Addy Frias [phone]       [place] " [facility] [address]  [facility] [address]        Equal Access to Services     MICHAELHARINI GARCIA : Hadii liane murcia yvetteana Xiangali, wajanetda brenda, dahlia christianrandykira riossanchokira, hung dawn haycesar maradiagaedgardoporfirio dominique . So [facility] [phone].    ATENCIÓN: Si habla español, tiene a barlow disposición servicios gratuitos de asistencia lingüística. Llame al [phone].    We comply with applicable federal civil rights laws and [place] laws. We do not discriminate on the basis of race, color, national origin, age, disability sex, sexual orientation or gender identity.            Thank you!     Thank you for choosing [facility] CANCER [facility] AND [facility]  for your care. Our goal is always to provide you with excellent care. Hearing back from our patients is one way we can continue to improve our services. Please take a few minutes to complete the written survey that you may receive in the mail after your visit with us. Thank you!             Your Updated Medication List - Protect others around you: Learn how to safely use, store and throw away your medicines at www.disposemymeds.org.          This list is accurate as of: 8/2/17 10:36 AM.  Always use your most recent med list.                   Brand Name Dispense Instructions for use Diagnosis    acyclovir 400 MG tablet    ZOVIRAX    60 tablet    Take 1 tablet (400 mg) by mouth 2 times daily Viral Prophylaxis.    Multiple myeloma not having achieved remission (H)       ASPIRIN NOT PRESCRIBED    INTENTIONAL    0 each    Antiplatelet medication not prescribed intentionally due to Current anticoagulant therapy (warfarin/enoxaparin)    Paroxysmal atrial fibrillation (H)       CALCIUM CITRATE + PO      Take 2,000 mg by mouth daily 2 tabs        carboxymethylcellulose 0.5 % Soln ophthalmic solution    REFRESH PLUS     1 drop 4 times daily        CLARINEX PO      Take by mouth daily Taking claritin        COMPAZINE PO      Take 10 mg by mouth daily as needed         cycloSPORINE 0.05 % ophthalmic emulsion    RESTASIS     Place 1 drop into both eyes every 12 hours        DAILY MULTIVITAMIN PO      Take 1 tablet by mouth daily.    Routine general medical examination at a health care facility       * dexamethasone 4 MG tablet    DECADRON    28 tablet    Take 20mg (5 tabs) by mouth every week the morning of velcade injection. Then take 1 tab (4mg) by mouth for two days after darzalex infusion.    Multiple myeloma not having achieved remission (H)       * dexamethasone 4 MG tablet    DECADRON    28 tablet    Take 20mg (5 tablets) PO every week on the morning of velcade injection. Then take 1 tablet (4mg) by mouth for two days after darzalex.    Multiple myeloma not having achieved remission (H)       erythromycin ophthalmic ointment    ROMYCIN     Place 1 Application into both eyes At Bedtime        GENTLE STOOL SOFTENER PO      Take 100 mg by mouth daily        lidocaine-prilocaine cream    EMLA    30 g    Apply topically as needed for moderate pain Apply dollop size amount to port site 30-60 min prior to accessing    Multiple myeloma not having achieved remission (H)       LORazepam 0.5 MG tablet    ATIVAN    20 tablet    Take 1 tablet (0.5 mg) by mouth every 8 hours as needed for anxiety    Multiple myeloma not having achieved remission (H)       metoprolol 50 MG 24 hr tablet    TOPROL-XL    180 tablet    Take 1 tablet (50 mg) by mouth 2 times daily    SVT (supraventricular tachycardia) (H), Paroxysmal atrial fibrillation (H)       oxyCODONE 15 MG IR tablet    ROXICODONE    90 tablet    Take 1 tablet (15 mg) by mouth every 8 hours as needed for pain maximum 4 tablet(s) per day    Multiple myeloma not having achieved remission (H)       polyethylene glycol powder    MIRALAX/GLYCOLAX     Take 1 capful by mouth daily as needed    Bilateral leg edema       timolol 0.25 % ophthalmic solution    TIMOPTIC     1 drop every morning        TYLENOL PO      Take 500 mg by mouth every 6 hours  as needed for mild pain or fever        UNABLE TO FIND      MEDICATION NAME: Fresh Coat eye drops        VITAMIN D3 PO      Take 1,000 Units by mouth daily        warfarin 4 MG tablet    COUMADIN    110 tablet    TAKE ONE AND ONE-HALF TABLETS BY MOUTH ON MONDAY, WEDNESDAY, AND FRIDAY AND ONE TABLET THE OTHER DAYS OF THE WEEK    Paroxysmal atrial fibrillation (H), Long-term (current) use of anticoagulants       ZOMETA IV      Inject into the vein every 30 days Every 3 month dosing        * Notice:  This list has 2 medication(s) that are the same as other medications prescribed for you. Read the directions carefully, and ask your doctor or other care provider to review them with you.

## 2017-08-04 ENCOUNTER — ANTICOAGULATION THERAPY VISIT (OUTPATIENT)
Dept: NURSING | Facility: CLINIC | Age: 82
End: 2017-08-04
Payer: COMMERCIAL

## 2017-08-04 DIAGNOSIS — Z79.01 LONG-TERM (CURRENT) USE OF ANTICOAGULANTS: ICD-10-CM

## 2017-08-04 LAB — INR POINT OF CARE: 2.9 (ref 0.86–1.14)

## 2017-08-04 PROCEDURE — 36416 COLLJ CAPILLARY BLOOD SPEC: CPT

## 2017-08-04 PROCEDURE — 85610 PROTHROMBIN TIME: CPT | Mod: QW

## 2017-08-04 PROCEDURE — 99207 ZZC NO CHARGE NURSE ONLY: CPT

## 2017-08-04 NOTE — PROGRESS NOTES
ANTICOAGULATION FOLLOW-UP CLINIC VISIT    Patient Name:  Amira Arreola  Date:  8/4/2017  Contact Type:  Face to Face    SUBJECTIVE:     Patient Findings     Positives No Problem Findings    Comments Still having weekly chemotherapy. Appetite is fair. States that she is able to eat some greens and protein. Patient states that she is feeling a little light headed. Advised the patient to call her doctor. Patient agrees with plan. Eufemia Boateng RN             OBJECTIVE    INR Protime   Date Value Ref Range Status   08/04/2017 2.9 (A) 0.86 - 1.14 Final       ASSESSMENT / PLAN  INR assessment THER    Recheck INR In: 2 WEEKS    INR Location Clinic      Anticoagulation Summary as of 8/4/2017     INR goal 2.0-3.0   Today's INR 2.9   Maintenance plan 6 mg (4 mg x 1.5) on Mon, Wed, Fri; 4 mg (4 mg x 1) all other days   Full instructions 6 mg on Mon, Wed, Fri; 4 mg all other days   Weekly total 34 mg   No change documented Eufemia Boateng RN   Plan last modified Dayana Barrera RN (3/10/2017)   Next INR check 8/18/2017   Target end date Indefinite    Indications   Long-term (current) use of anticoagulants [Z79.01] [Z79.01]  Atrial fibrillation (H) [I48.91] (Resolved) [I48.91]         Anticoagulation Episode Summary     INR check location     Preferred lab     Send INR reminders to [facility]    Comments       Anticoagulation Care Providers     Provider Role Specialty Phone number    Addy Frias MD [facility] Internal Medicine [phone]            See the Encounter Report to view Anticoagulation Flowsheet and Dosing Calendar (Go to Encounters tab in chart review, and find the Anticoagulation Therapy Visit)    Dosage adjustment made based on physician directed care plan.    Eufemia Boateng, RN

## 2017-08-04 NOTE — MR AVS SNAPSHOT
Amira Arreola   8/4/2017 9:45 AM   Anticoagulation Therapy Visit    Description:  85 year old female   Provider:  [facility]   Department:  [facility]           INR as of 8/4/2017     Today's INR 2.9      Anticoagulation Summary as of 8/4/2017     INR goal 2.0-3.0   Today's INR 2.9   Full instructions 6 mg on Mon, Wed, Fri; 4 mg all other days   Next INR check 8/18/2017    Indications   Long-term (current) use of anticoagulants [Z79.01] [Z79.01]  Atrial fibrillation (H) [I48.91] (Resolved) [I48.91]         Your next Anticoagulation Clinic appointment(s)     Aug 18, 2017 10:00 AM CDT   Anticoagulation Visit with  ANTICOAGULATION CLINIC   [facility]    [address] [place] [id number]   [phone]              Contact Numbers     Clinic Number:         August 2017 Details    Sun Mon Tue Wed Thu Fri Sat       1               2               3               4      6 mg   See details      5      4 mg           6      4 mg         7      6 mg         8      4 mg         9      6 mg         10      4 mg         11      6 mg         12      4 mg           13      4 mg         14      6 mg         15      4 mg         16      6 mg         17      4 mg         18            19                 20               21               22               23               24               25               26                 27               28               29               30               31                  Date Details   08/04 This INR check       Date of next INR:  8/18/2017         How to take your warfarin dose     To take:  4 mg Take 1 of the 4 mg tablets.    To take:  6 mg Take 1.5 of the 4 mg tablets.

## 2017-08-09 ENCOUNTER — INFUSION THERAPY VISIT (OUTPATIENT)
Dept: INFUSION THERAPY | Facility: CLINIC | Age: 82
End: 2017-08-09
Attending: INTERNAL MEDICINE
Payer: MEDICARE

## 2017-08-09 ENCOUNTER — HOSPITAL ENCOUNTER (OUTPATIENT)
Facility: CLINIC | Age: 82
Setting detail: SPECIMEN
Discharge: HOME | End: 2017-08-09
Attending: INTERNAL MEDICINE | Admitting: INTERNAL MEDICINE
Payer: MEDICARE

## 2017-08-09 VITALS
RESPIRATION RATE: 18 BRPM | SYSTOLIC BLOOD PRESSURE: 125 MMHG | WEIGHT: 134.4 LBS | DIASTOLIC BLOOD PRESSURE: 68 MMHG | HEIGHT: 66 IN | BODY MASS INDEX: 21.6 KG/M2 | OXYGEN SATURATION: 96 % | HEART RATE: 77 BPM | TEMPERATURE: 98.4 F

## 2017-08-09 DIAGNOSIS — C90.00 MULTIPLE MYELOMA NOT HAVING ACHIEVED REMISSION (H): Primary | ICD-10-CM

## 2017-08-09 LAB
BASOPHILS # BLD AUTO: 0 10E9/L (ref 0–0.2)
BASOPHILS NFR BLD AUTO: 0 %
DIFFERENTIAL METHOD BLD: ABNORMAL
EOSINOPHIL # BLD AUTO: 0 10E9/L (ref 0–0.7)
EOSINOPHIL NFR BLD AUTO: 0.7 %
ERYTHROCYTE [DISTWIDTH] IN BLOOD BY AUTOMATED COUNT: 14.9 % (ref 10–15)
HCT VFR BLD AUTO: 35.2 % (ref 35–47)
HGB BLD-MCNC: 11.9 G/DL (ref 11.7–15.7)
IMM GRANULOCYTES # BLD: 0 10E9/L (ref 0–0.4)
IMM GRANULOCYTES NFR BLD: 0.2 %
LYMPHOCYTES # BLD AUTO: 0.8 10E9/L (ref 0.8–5.3)
LYMPHOCYTES NFR BLD AUTO: 14.3 %
MCH RBC QN AUTO: 34 PG (ref 26.5–33)
MCHC RBC AUTO-ENTMCNC: 33.8 G/DL (ref 31.5–36.5)
MCV RBC AUTO: 101 FL (ref 78–100)
MONOCYTES # BLD AUTO: 0.6 10E9/L (ref 0–1.3)
MONOCYTES NFR BLD AUTO: 9.6 %
NEUTROPHILS # BLD AUTO: 4.3 10E9/L (ref 1.6–8.3)
NEUTROPHILS NFR BLD AUTO: 75.2 %
NRBC # BLD AUTO: 0 10*3/UL
NRBC BLD AUTO-RTO: 0 /100
PLATELET # BLD AUTO: 120 10E9/L (ref 150–450)
RBC # BLD AUTO: 3.5 10E12/L (ref 3.8–5.2)
WBC # BLD AUTO: 5.7 10E9/L (ref 4–11)

## 2017-08-09 PROCEDURE — 96415 CHEMO IV INFUSION ADDL HR: CPT

## 2017-08-09 PROCEDURE — 85025 COMPLETE CBC W/AUTO DIFF WBC: CPT | Performed by: INTERNAL MEDICINE

## 2017-08-09 PROCEDURE — 25000128 H RX IP 250 OP 636: Performed by: INTERNAL MEDICINE

## 2017-08-09 PROCEDURE — 96401 CHEMO ANTI-NEOPL SQ/IM: CPT

## 2017-08-09 PROCEDURE — 96375 TX/PRO/DX INJ NEW DRUG ADDON: CPT

## 2017-08-09 PROCEDURE — 96413 CHEMO IV INFUSION 1 HR: CPT

## 2017-08-09 PROCEDURE — 96367 TX/PROPH/DG ADDL SEQ IV INF: CPT

## 2017-08-09 RX ORDER — DIPHENHYDRAMINE HCL 25 MG
50 CAPSULE ORAL ONCE
Status: DISCONTINUED | OUTPATIENT
Start: 2017-08-09 | End: 2017-08-09 | Stop reason: CLARIF

## 2017-08-09 RX ORDER — HEPARIN SODIUM (PORCINE) LOCK FLUSH IV SOLN 100 UNIT/ML 100 UNIT/ML
5 SOLUTION INTRAVENOUS EVERY 8 HOURS
Status: DISCONTINUED | OUTPATIENT
Start: 2017-08-09 | End: 2017-08-09 | Stop reason: HOSPADM

## 2017-08-09 RX ADMIN — SODIUM CHLORIDE 250 ML: 9 INJECTION, SOLUTION INTRAVENOUS at 10:02

## 2017-08-09 RX ADMIN — SODIUM CHLORIDE, PRESERVATIVE FREE 5 ML: 5 INJECTION INTRAVENOUS at 13:41

## 2017-08-09 RX ADMIN — BORTEZOMIB 2.2 MG: 3.5 INJECTION, POWDER, LYOPHILIZED, FOR SOLUTION INTRAVENOUS; SUBCUTANEOUS at 11:39

## 2017-08-09 RX ADMIN — DARATUMUMAB 1000 MG: 100 INJECTION, SOLUTION, CONCENTRATE INTRAVENOUS at 10:32

## 2017-08-09 RX ADMIN — DIPHENHYDRAMINE HYDROCHLORIDE 50 MG: 50 INJECTION, SOLUTION INTRAMUSCULAR; INTRAVENOUS at 10:17

## 2017-08-09 RX ADMIN — DEXAMETHASONE SODIUM PHOSPHATE 12 MG: 10 INJECTION, SOLUTION INTRAMUSCULAR; INTRAVENOUS at 10:00

## 2017-08-09 ASSESSMENT — PAIN SCALES - GENERAL: PAINLEVEL: NO PAIN (0)

## 2017-08-09 NOTE — MR AVS SNAPSHOT
After Visit Summary   8/9/2017    Amira Arreola    MRN: [id number]           Patient Information     Date Of Birth          7/17/1932        Visit Information        Provider Department      8/9/2017 8:30 AM  INFUSION CHAIR 8 [facility]        Today's Diagnoses     Multiple myeloma not having achieved remission (H)    -  1       Follow-ups after your visit        Your next 10 appointments already scheduled     Aug 16, 2017  9:00 AM CDT   Level 7 with  INFUSION CHAIR 12   [facility] ([facility])    [facility]  [address]  [facility] [address]   [phone]            Aug 16, 2017  9:30 AM CDT   Return Visit with Shayne Roberts MD   [facility]    [facility]  [address]  [facility] [address]   [phone]            Aug 18, 2017 10:00 AM CDT   Anticoagulation Visit with [facility]   [facility] ([address] [address]   [phone]            Sep 21, 2017 10:45 AM CDT   Return Visit with Ramos Rivera MD   [facility] [place] ([facility])    [address] [place] [address] [address]  [facility] [id number] [phone]              Who to contact     If you have questions or need follow up information about today's clinic visit or your schedule please contact [facility] directly at [phone].  Normal or non-critical lab and imaging results will be communicated to you by MyChart, letter or phone within 4 business days after the clinic has received the results. If you do not hear from us within 7 days, please contact the clinic through MyChart or phone. If you have a critical or abnormal lab result, we will notify you by phone as soon as  "possible.  Submit refill requests through [employer] or call your pharmacy and they will forward the refill request to us. Please allow 3 business days for your refill to be completed.          Additional Information About Your Visit        ChangeYourFlightharVHSquared Information     [employer] lets you send messages to your doctor, view your test results, renew your prescriptions, schedule appointments and more. To sign up, go to [email or web address] . Click on \"Log in\" on the left side of the screen, which will take you to the Welcome page. Then click on \"Sign up Now\" on the right side of the page.     You will be asked to enter the access code listed below, as well as some personal information. Please follow the directions to create your username and password.     Your access code is: [email or web address]  Expires: 2017  2:33 PM     Your access code will  in 90 days. If you need help or a new code, please call your [place] clinic or [phone].        Care EveryWhere ID     This is your Care EveryWhere ID. This could be used by other organizations to access your [place] medical records  [id number]        Your Vitals Were     Pulse Temperature Respirations Height Pulse Oximetry BMI (Body Mass Index)    77 98.4  F (36.9  C) (Oral) 18 1.676 m (5' 5.98\") 96% 21.7 kg/m2       Blood Pressure from Last 3 Encounters:   17 125/68   17 143/83   17 128/70    Weight from Last 3 Encounters:   17 61 kg (134 lb 6.4 oz)   17 59.9 kg (132 lb)   17 59.3 kg (130 lb 12.8 oz)              We Performed the Following     CBC with platelets differential        Primary Care Provider Office Phone # Fax #    Addy Frias [phone] [address]  [facility] [address]        Equal Access to Services     SARA ZELAYA AH: Gissel Galloway, rhonda gutierrez, hung albertn ah. [facility] [phone].    ATENCIÓN: Si habla " español, tiene a barlow disposición servicios gratuitos de asistencia lingüística. Kadie eller [phone].    We comply with applicable federal civil rights laws and [place] laws. We do not discriminate on the basis of race, color, national origin, age, disability sex, sexual orientation or gender identity.            Thank you!     Thank you for choosing [facility] AND [facility]  for your care. Our goal is always to provide you with excellent care. Hearing back from our patients is one way we can continue to improve our services. Please take a few minutes to complete the written survey that you may receive in the mail after your visit with us. Thank you!             Your Updated Medication List - Protect others around you: Learn how to safely use, store and throw away your medicines at www.disposemymeds.org.          This list is accurate as of: 8/9/17  1:47 PM.  Always use your most recent med list.                   Brand Name Dispense Instructions for use Diagnosis    acyclovir 400 MG tablet    ZOVIRAX    60 tablet    Take 1 tablet (400 mg) by mouth 2 times daily Viral Prophylaxis.    Multiple myeloma not having achieved remission (H)       ASPIRIN NOT PRESCRIBED    INTENTIONAL    0 each    Antiplatelet medication not prescribed intentionally due to Current anticoagulant therapy (warfarin/enoxaparin)    Paroxysmal atrial fibrillation (H)       CALCIUM CITRATE + PO      Take 2,000 mg by mouth daily 2 tabs        carboxymethylcellulose 0.5 % Soln ophthalmic solution    REFRESH PLUS     1 drop 4 times daily        CLARINEX PO      Take by mouth daily Taking claritin        COMPAZINE PO      Take 10 mg by mouth daily as needed        cycloSPORINE 0.05 % ophthalmic emulsion    RESTASIS     Place 1 drop into both eyes every 12 hours        DAILY MULTIVITAMIN PO      Take 1 tablet by mouth daily.    Routine general medical examination at a health care facility       * dexamethasone 4 MG tablet     DECADRON    28 tablet    Take 20mg (5 tabs) by mouth every week the morning of velcade injection. Then take 1 tab (4mg) by mouth for two days after darzalex infusion.    Multiple myeloma not having achieved remission (H)       * dexamethasone 4 MG tablet    DECADRON    28 tablet    Take 20mg (5 tablets) PO every week on the morning of velcade injection. Then take 1 tablet (4mg) by mouth for two days after darzalex.    Multiple myeloma not having achieved remission (H)       erythromycin ophthalmic ointment    ROMYCIN     Place 1 Application into both eyes At Bedtime        GENTLE STOOL SOFTENER PO      Take 100 mg by mouth daily        lidocaine-prilocaine cream    EMLA    30 g    Apply topically as needed for moderate pain Apply dollop size amount to port site 30-60 min prior to accessing    Multiple myeloma not having achieved remission (H)       LORazepam 0.5 MG tablet    ATIVAN    20 tablet    Take 1 tablet (0.5 mg) by mouth every 8 hours as needed for anxiety    Multiple myeloma not having achieved remission (H)       metoprolol 50 MG 24 hr tablet    TOPROL-XL    180 tablet    Take 1 tablet (50 mg) by mouth 2 times daily    SVT (supraventricular tachycardia) (H), Paroxysmal atrial fibrillation (H)       oxyCODONE 15 MG IR tablet    ROXICODONE    90 tablet    Take 1 tablet (15 mg) by mouth every 8 hours as needed for pain maximum 4 tablet(s) per day    Multiple myeloma not having achieved remission (H)       polyethylene glycol powder    MIRALAX/GLYCOLAX     Take 1 capful by mouth daily as needed    Bilateral leg edema       timolol 0.25 % ophthalmic solution    TIMOPTIC     1 drop every morning        TYLENOL PO      Take 500 mg by mouth every 6 hours as needed for mild pain or fever        UNABLE TO FIND      MEDICATION NAME: Fresh Coat eye drops        VITAMIN D3 PO      Take 1,000 Units by mouth daily        warfarin 4 MG tablet    COUMADIN    110 tablet    TAKE ONE AND ONE-HALF TABLETS BY MOUTH ON MONDAY,  WEDNESDAY, AND FRIDAY AND ONE TABLET THE OTHER DAYS OF THE WEEK    Paroxysmal atrial fibrillation (H), Long-term (current) use of anticoagulants       ZOMETA IV      Inject into the vein every 30 days Every 3 month dosing        * Notice:  This list has 2 medication(s) that are the same as other medications prescribed for you. Read the directions carefully, and ask your doctor or other care provider to review them with you.

## 2017-08-09 NOTE — PROGRESS NOTES
Infusion Nursing Note:  Amiramorteza Arreola presents today for Velcade/Daratumumab C3D15.    Patient seen by provider today: No   present during visit today: Not Applicable.    Note: N/A.    Intravenous Access:  Labs drawn without difficulty.  Implanted Port.    Treatment Conditions:  Lab Results   Component Value Date    HGB 11.9 08/09/2017     Lab Results   Component Value Date    WBC 5.7 08/09/2017      Lab Results   Component Value Date    ANEU 4.3 08/09/2017     Lab Results   Component Value Date     08/09/2017      Results reviewed, labs MET treatment parameters, ok to proceed with treatment.        Post Infusion Assessment:  Patient tolerated infusion without incident.  Patient tolerated injection without incident.  Blood return noted pre and post infusion.  Site patent and intact, free from redness, edema or discomfort.  No evidence of extravasations.  Access discontinued per protocol.    Discharge Plan: Discharge instructions reviewed with: Patient.  Patient and/or family verbalized understanding of discharge instructions and all questions answered.  Copy of AVS reviewed with patient and/or family.  Patient will return 8/16 for next appointment.  Patient discharged in stable condition accompanied by: self and .  Departure Mode: Ambulatory.    Caitie Josue RN

## 2017-08-16 ENCOUNTER — INFUSION THERAPY VISIT (OUTPATIENT)
Dept: INFUSION THERAPY | Facility: CLINIC | Age: 82
End: 2017-08-16
Attending: INTERNAL MEDICINE
Payer: MEDICARE

## 2017-08-16 ENCOUNTER — ONCOLOGY VISIT (OUTPATIENT)
Dept: ONCOLOGY | Facility: CLINIC | Age: 82
End: 2017-08-16
Attending: INTERNAL MEDICINE
Payer: MEDICARE

## 2017-08-16 ENCOUNTER — HOSPITAL ENCOUNTER (OUTPATIENT)
Facility: CLINIC | Age: 82
Setting detail: SPECIMEN
Discharge: HOME | End: 2017-08-16
Attending: INTERNAL MEDICINE | Admitting: INTERNAL MEDICINE
Payer: MEDICARE

## 2017-08-16 VITALS
TEMPERATURE: 97.8 F | RESPIRATION RATE: 16 BRPM | DIASTOLIC BLOOD PRESSURE: 78 MMHG | SYSTOLIC BLOOD PRESSURE: 131 MMHG | HEART RATE: 66 BPM | OXYGEN SATURATION: 99 %

## 2017-08-16 VITALS
SYSTOLIC BLOOD PRESSURE: 131 MMHG | RESPIRATION RATE: 16 BRPM | OXYGEN SATURATION: 99 % | BODY MASS INDEX: 21.64 KG/M2 | DIASTOLIC BLOOD PRESSURE: 78 MMHG | WEIGHT: 134 LBS | TEMPERATURE: 97.8 F | HEART RATE: 66 BPM

## 2017-08-16 DIAGNOSIS — C90.00 MULTIPLE MYELOMA NOT HAVING ACHIEVED REMISSION (H): Primary | ICD-10-CM

## 2017-08-16 LAB
BASOPHILS # BLD AUTO: 0 10E9/L (ref 0–0.2)
BASOPHILS NFR BLD AUTO: 0.2 %
DIFFERENTIAL METHOD BLD: ABNORMAL
EOSINOPHIL # BLD AUTO: 0.1 10E9/L (ref 0–0.7)
EOSINOPHIL NFR BLD AUTO: 0.9 %
ERYTHROCYTE [DISTWIDTH] IN BLOOD BY AUTOMATED COUNT: 14.5 % (ref 10–15)
HCT VFR BLD AUTO: 37 % (ref 35–47)
HGB BLD-MCNC: 12.7 G/DL (ref 11.7–15.7)
IMM GRANULOCYTES # BLD: 0 10E9/L (ref 0–0.4)
IMM GRANULOCYTES NFR BLD: 0.4 %
LYMPHOCYTES # BLD AUTO: 0.9 10E9/L (ref 0.8–5.3)
LYMPHOCYTES NFR BLD AUTO: 16.9 %
MCH RBC QN AUTO: 34.4 PG (ref 26.5–33)
MCHC RBC AUTO-ENTMCNC: 34.3 G/DL (ref 31.5–36.5)
MCV RBC AUTO: 100 FL (ref 78–100)
MONOCYTES # BLD AUTO: 0.9 10E9/L (ref 0–1.3)
MONOCYTES NFR BLD AUTO: 16.2 %
NEUTROPHILS # BLD AUTO: 3.6 10E9/L (ref 1.6–8.3)
NEUTROPHILS NFR BLD AUTO: 65.4 %
NRBC # BLD AUTO: 0 10*3/UL
NRBC BLD AUTO-RTO: 0 /100
PLATELET # BLD AUTO: 141 10E9/L (ref 150–450)
RBC # BLD AUTO: 3.69 10E12/L (ref 3.8–5.2)
WBC # BLD AUTO: 5.5 10E9/L (ref 4–11)

## 2017-08-16 PROCEDURE — 36415 COLL VENOUS BLD VENIPUNCTURE: CPT

## 2017-08-16 PROCEDURE — 85025 COMPLETE CBC W/AUTO DIFF WBC: CPT | Performed by: INTERNAL MEDICINE

## 2017-08-16 PROCEDURE — 99214 OFFICE O/P EST MOD 30 MIN: CPT | Performed by: INTERNAL MEDICINE

## 2017-08-16 PROCEDURE — 96401 CHEMO ANTI-NEOPL SQ/IM: CPT

## 2017-08-16 PROCEDURE — 25000128 H RX IP 250 OP 636: Performed by: INTERNAL MEDICINE

## 2017-08-16 RX ORDER — OXYCODONE HYDROCHLORIDE 15 MG/1
15 TABLET ORAL EVERY 8 HOURS PRN
Qty: 90 TABLET | Refills: 0 | Status: SHIPPED | OUTPATIENT
Start: 2017-08-16 | End: 2017-09-13

## 2017-08-16 RX ADMIN — BORTEZOMIB 2.2 MG: 3.5 INJECTION, POWDER, LYOPHILIZED, FOR SOLUTION INTRAVENOUS; SUBCUTANEOUS at 10:35

## 2017-08-16 ASSESSMENT — PAIN SCALES - GENERAL
PAINLEVEL: NO PAIN (0)
PAINLEVEL: NO PAIN (0)

## 2017-08-16 NOTE — PROGRESS NOTES
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     1.  She had work-up for thrombocytopenia.       - On 09/21/2015, WBC of 4.2, hemoglobin of 13.2 and platelets of 138. CMP normal except mildly low protein of 6.4.   -On 09/29/2015, SPEP does not reveal any M-spike.   - On 10/02/2015, JANET does not reveal any monoclonal protein.     - On 10/22/2015, urine immunofixation reveals monoclonal free kappa light chain.    2. On 05/11/2016, kappa light chain of 50, lambda light chain of 0.32 and ratio of kappa to lambda of 156.2.  3. Skeletal survey on 05/23/2016 does not reveal any lytic lesion.    4. Bone marrow biopsy on 05/25/2016 reveals 40-50% kappa light chain restricted plasma cells.  Cytogenetics is normal. 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    5. MRI of bones on 06/21/2016 and 06/22/2016 reveals myeloma lesions.  6. On 08/24/2016, she was started on revlimid 25 mg 3 weeks on and 1 week off along with dexamethasone 20 mg weekly. Due to cytopenia, dose was subsequently reduced to 15 mg a day. Treatment in between had to be delayed because of cytopenia. She did not have any significant response to treatment.   7. Velcade and dexamethasone started on 03/21/2017.    8. On 03/21/2017, kappa free light chain was 52.5.  It decreased to 41.75 on 04/18/2017.  It  increased to 60.75 on 05/16/2017.    9. Daratumumab added on 05/31/2017.   10.  On 06/28/2017, kappa free light chain is down to 6.43.  11.  On 07/26/2017, kappa free light chain of 13.10.      SUBJECTIVE:    Ms. Amira Arreola is an 85-year-old female with kappa free light chain multiple myeloma.  She is on treatment with Velcade, daratumumab and dexamethasone since 05/31/2017.  She is tolerating it well.        Patient's main problem is fatigue.  It has not gotten worse.  Because of  fatigue, she doesn't do much activities.  She also has pain in the in mid upper back. She takes about 3 oxycodone a day.  Oxycodone helps with the pain.  She wants a refill on oxycodone.       Review of systems:   Denies any headache.  No dizziness.  No chest pain.  No shortness of breath.  No nausea or vomiting.  Appetite has been good.  No fever or chills.  No urinary complaints. She has constipation.  She has ankle edema.  It has not gotten worse.      PHYSICAL EXAMINATION:   Alert and oriented x 3.   EYES:  No icterus.   THROAT:  No ulcer or thrush.   NECK:  Supple. No lymphadenopathy.   AXILLAE:  No lymphadenopathy.   LUNGS:  Good air entry bilaterally.  No crackles or wheezing.   HEART:  Regular.  No murmur.   ABDOMEN:  Soft and nontender.  No mass.   EXTREMITIES: Bilateral pedal edema. No calf swelling or tenderness.   SKIN:  No rash.        LABORATORY DATA:  Reviewed.       ASSESSMENT:   1.  A 85-year-old female with kappa free light chain multiple myeloma on daratumumab, Velcade and dexamethasone.  2.  Back pain from myeloma bone lesion.   3.  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       PLAN:   1.  Discussed with the patient and her  regarding myeloma.  Labs were reviewed. [place] free light chain has increased mildly.  We will monitor free light chain.  If it continues to progress, we'll have to make changes to the treatment.  For now patient will be continued on Velcade, daratumumab and dexamethasone.    2.  Discussed regarding fatigue.  Fatigue is due to multiple factors including multiple myeloma, chemotherapy and her age.  I told the patient that fatigue is not going to improve.  It may get worse.      3. She will continue on Zometa every three months.  She is tolerating it well.  She is not having any dental or jaw related problem.      4.  For pain, she will continue on oxycodone.  It controls the pain.  Prescription refilled.     4.  She had a few questions, which were all answered.  I will  see her in about a month. Advised her to call us if she has fever, chills, infection, worsening weakness, shortness of breath or any other concerns.

## 2017-08-16 NOTE — PROGRESS NOTES
"Oncology Rooming Note    August 16, 2017 9:21 AM   Amira Arreola is a 85 year old female who presents for:    Chief Complaint   Patient presents with     Oncology Clinic Visit     follow up     Initial Vitals: /78  Pulse 66  Temp 97.8  F (36.6  C) (Oral)  Resp 16  Wt 60.8 kg (134 lb)  SpO2 99%  BMI 21.64 kg/m2 Estimated body mass index is 21.64 kg/(m^2) as calculated from the following:    Height as of 8/9/17: 1.676 m (5' 5.98\").    Weight as of this encounter: 60.8 kg (134 lb). Body surface area is 1.68 meters squared.  No Pain (0) Comment: later in the afternoon, theres discomfort    No LMP recorded. Patient is postmenopausal.  Allergies reviewed: Yes  Medications reviewed: Yes    Medications: MEDICATION REFILLS NEEDED TODAY. Provider was notified.  [facility] into [facility] [address] [place] [address] [address] [place] [facility] [place] [address]    Clinical concerns: REFILL decadron and Pain Medication. How often is Zometa given?  Armando was notified.    8 minutes for nursing intake (face to face time)     Tigist Kimbrough MA          DISCHARGE PLAN:    Walked back to infusion and reported to [facility] Infusion for velcade today    Dates to schedule given to :: ( treatment dates okd by pharmacy)    Scripts handed over to Amira    8/23/17: Zometa/ Velcade darzolex  8/30/17: Velcade  9/6/17: Velcade/ darzolex   9/13/17: Velcade  9/20/17: Velcade/ darzolex/ MD visit as well      Next appointments: See patient instruction section  Departure Mode: Ambulatory  Accompanied by:   10 minutes for nursing discharge (face to face time)   Tameka Daigle RN      "

## 2017-08-16 NOTE — PATIENT INSTRUCTIONS
Continue chemotherapy.  Scheduled/Janice  Continue zometa.   Scheduled/janice  Follow up in 3-4 weeks.   Scheduled/Dorita        AVS printed & given to patient/Dorita

## 2017-08-16 NOTE — MR AVS SNAPSHOT
After Visit Summary   8/16/2017    Amira Arreola    MRN: [id number]           Patient Information     Date Of Birth          7/17/1932        Visit Information        Provider Department      8/16/2017 9:30 AM Shayne Roberts MD [facility]        Today's Diagnoses     Multiple myeloma not having achieved remission (H)    -  1      Care Instructions    Continue chemotherapy.  Continue zometa.  Follow up in 3-4 weeks.          Follow-ups after your visit        Your next 10 appointments already scheduled     Aug 18, 2017 10:00 AM CDT   Anticoagulation Visit with EC ANTICOAGULATION CLINIC   [facility]    [address] [place] [address]   [phone]            Aug 23, 2017  8:30 AM CDT   Level 4 with SH INFUSION CHAIR 7   [facility] ([facility])    [facility]  [address]  [place] [address]   [phone]            Aug 30, 2017  9:30 AM CDT   Level 2 with SH INFUSION CHAIR 12   [facility] ([facility])    [facility]  [address]  [place] [address]   [phone]            Sep 06, 2017  9:00 AM CDT   Level 4 with SH INFUSION CHAIR 18   [facility] ([facility]    [facility]  [address]  [place] [address]   [phone]            Sep 13, 2017  9:00 AM CDT   Level 2 with SH INFUSION CHAIR 7   [facility] ([facility]    [facility]  [address]  [place] [address]   [phone]            Sep 20, 2017  9:00 AM CDT   Level 4 with SH INFUSION CHAIR 14   [facility] ([facility]    [facility]  [address]  [place] [address]   [phone]            Sep 20, 2017  9:30 AM CDT   Return Visit with Shayne Roberts MD   [facility] ([facility])    [facility]  [address] [address]   [phone]              Who to contact     If you have questions or need follow up information about today's clinic visit or your schedule please contact [facility] directly at [phone].  Normal or non-critical lab and imaging results will be communicated to you by iSyndicahart, letter or phone within 4 business days after the clinic has received the results. If you do not hear from us within 7 days, please contact the clinic through iSyndicahart or phone. If you have a critical or abnormal lab result, we will notify you by phone as soon as possible.  Submit refill requests through Animatu Multimedia or call your pharmacy and they will forward the refill request to us. Please allow 3 business days for your refill to be completed.          Additional Information About Your Visit        MyChart Information     [employer] lets you send messages to your doctor, view your test results, renew your prescriptions, schedule appointments and more. To sign up, go to [email or web address] . Click on \"Log in\" on the left side of the screen, which will take you to the Welcome page. Then click on \"Sign up Now\" on the right side of the page.     You will be asked to enter the access code listed below, as well as some personal information. Please follow the directions to create your username and password.     Your access code is: [email or web address]  Expires: 2017  2:33 PM     Your access code will  in 90 days. If you need help or a new code, please call your [place] clinic or [phone].        Care EveryWhere ID     This is your Care EveryWhere ID. This could be used by other organizations to access your [place] medical records  [id number]        Your Vitals Were     Pulse " Temperature Respirations Pulse Oximetry BMI (Body Mass Index)       66 97.8  F (36.6  C) (Oral) 16 99% 21.64 kg/m2        Blood Pressure from Last 3 Encounters:   08/16/17 131/78   08/16/17 131/78   08/09/17 125/68    Weight from Last 3 Encounters:   08/16/17 60.8 kg (134 lb)   08/09/17 61 kg (134 lb 6.4 oz)   08/02/17 59.9 kg (132 lb)              Today, you had the following     No orders found for display         Where to get your medicines      Some of these will need a paper prescription and others can be bought over the counter.  Ask your nurse if you have questions.     Bring a paper prescription for each of these medications     oxyCODONE 15 MG IR tablet          Primary Care Provider Office Phone # Fax #    Addy Sean Frias [phone] [address] [facility] [address]  [place] [address]        Equal Access to Services     [facility] : Hadii liane murcia hadasho Soyair, waaxda luqadaha, qaybta kaalmada adeegyakira, hung dominique . So [facility] [phone].    ATENCIÓN: Si habla español, tiene a barlow disposición servicios gratuitos de asistencia lingüística. Llame al [phone].    We comply with applicable federal civil rights laws and [place] laws. We do not discriminate on the basis of race, color, national origin, age, disability sex, sexual orientation or gender identity.            Thank you!     Thank you for choosing [facility]  for your care. Our goal is always to provide you with excellent care. Hearing back from our patients is one way we can continue to improve our services. Please take a few minutes to complete the written survey that you may receive in the mail after your visit with us. Thank you!             Your Updated Medication List - Protect others around you: Learn how to safely use, store and throw away your medicines at www.disposemymeds.org.          This list is accurate as of: 8/16/17  2:12 PM.  Always use your most recent med list.                    Brand Name Dispense Instructions for use Diagnosis    acyclovir 400 MG tablet    ZOVIRAX    60 tablet    Take 1 tablet (400 mg) by mouth 2 times daily Viral Prophylaxis.    Multiple myeloma not having achieved remission (H)       ASPIRIN NOT PRESCRIBED    INTENTIONAL    0 each    Antiplatelet medication not prescribed intentionally due to Current anticoagulant therapy (warfarin/enoxaparin)    Paroxysmal atrial fibrillation (H)       CALCIUM CITRATE + PO      Take 2,000 mg by mouth daily 2 tabs        carboxymethylcellulose 0.5 % Soln ophthalmic solution    REFRESH PLUS     1 drop 4 times daily        CLARINEX PO      Take by mouth daily Taking claritin        COMPAZINE PO      Take 10 mg by mouth daily as needed        cycloSPORINE 0.05 % ophthalmic emulsion    RESTASIS     Place 1 drop into both eyes every 12 hours        DAILY MULTIVITAMIN PO      Take 1 tablet by mouth daily.    Routine general medical examination at a health care facility       * dexamethasone 4 MG tablet    DECADRON    28 tablet    Take 20mg (5 tabs) by mouth every week the morning of velcade injection. Then take 1 tab (4mg) by mouth for two days after darzalex infusion.    Multiple myeloma not having achieved remission (H)       * dexamethasone 4 MG tablet    DECADRON    28 tablet    Take 20mg (5 tablets) PO every week on the morning of velcade injection. Then take 1 tablet (4mg) by mouth for two days after darzalex.    Multiple myeloma not having achieved remission (H)       erythromycin ophthalmic ointment    ROMYCIN     Place 1 Application into both eyes At Bedtime        GENTLE STOOL SOFTENER PO      Take 100 mg by mouth daily        lidocaine-prilocaine cream    EMLA    30 g    Apply topically as needed for moderate pain Apply dollop size amount to port site 30-60 min prior to accessing    Multiple myeloma not having achieved remission (H)       LORazepam 0.5 MG tablet    ATIVAN    20 tablet    Take 1 tablet (0.5 mg) by  mouth every 8 hours as needed for anxiety    Multiple myeloma not having achieved remission (H)       metoprolol 50 MG 24 hr tablet    TOPROL-XL    180 tablet    Take 1 tablet (50 mg) by mouth 2 times daily    SVT (supraventricular tachycardia) (H), Paroxysmal atrial fibrillation (H)       oxyCODONE 15 MG IR tablet    ROXICODONE    90 tablet    Take 1 tablet (15 mg) by mouth every 8 hours as needed for pain maximum 4 tablet(s) per day    Multiple myeloma not having achieved remission (H)       polyethylene glycol powder    MIRALAX/GLYCOLAX     Take 1 capful by mouth daily as needed    Bilateral leg edema       timolol 0.25 % ophthalmic solution    TIMOPTIC     1 drop every morning        TYLENOL PO      Take 500 mg by mouth every 6 hours as needed for mild pain or fever        UNABLE TO FIND      MEDICATION NAME: Fresh Coat eye drops        VITAMIN D3 PO      Take 1,000 Units by mouth daily        warfarin 4 MG tablet    COUMADIN    110 tablet    TAKE ONE AND ONE-HALF TABLETS BY MOUTH ON MONDAY, WEDNESDAY, AND FRIDAY AND ONE TABLET THE OTHER DAYS OF THE WEEK    Paroxysmal atrial fibrillation (H), Long-term (current) use of anticoagulants       ZOMETA IV      Inject into the vein every 30 days Every 3 month dosing        * Notice:  This list has 2 medication(s) that are the same as other medications prescribed for you. Read the directions carefully, and ask your doctor or other care provider to review them with you.

## 2017-08-16 NOTE — MR AVS SNAPSHOT
After Visit Summary   8/16/2017    Amira Arreola    MRN: [id number]           Patient Information     Date Of Birth          7/17/1932        Visit Information        Provider Department      8/16/2017 9:00 AM SH INFUSION CHAIR 12 [facility]        Today's Diagnoses     Multiple myeloma not having achieved remission (H)    -  1       Follow-ups after your visit        Your next 10 appointments already scheduled     Aug 18, 2017 10:00 AM CDT   Anticoagulation Visit with EC ANTICOAGULATION CLINIC   [facility] ([address] [address]   [phone]            Aug 23, 2017  8:30 AM CDT   Level 4 with SH INFUSION CHAIR 7   [facility] ([facility])    [facility]  [address]  [place] [address]   [phone]            Aug 30, 2017  9:30 AM CDT   Level 2 with SH INFUSION CHAIR 12   [facility] ([facility])    [facility]  [address]  [place] [address]   [phone]            Sep 06, 2017  9:00 AM CDT   Level 4 with SH INFUSION CHAIR 18   [facility] ([facility])    [facility]  [address]  [place] [address]   [phone]            Sep 13, 2017  9:00 AM CDT   Level 2 with SH INFUSION CHAIR 7   [facility] ([facility])    [facility]  [address]  [place] [address]   [phone]            Sep 20, 2017  9:00 AM CDT   Level 4 with SH INFUSION CHAIR 14   [facility] ([facility])    [facility]  [address]  [place] [address]   [phone]            Sep 20, 2017  9:30 AM CDT  "  Return Visit with Shayne Roberts MD   [facility] ([facility])    [facility] [place]  [address]  [place] [id number] [phone]            Sep 21, 2017 10:45 AM CDT   Return Visit with Ramos Rivera MD   [facility] [place] ([facility])    [address]  [place] [address] [phone]              Who to contact     If you have questions or need follow up information about today's clinic visit or your schedule please contact [facility] directly at [phone].  Normal or non-critical lab and imaging results will be communicated to you by TextHoghart, letter or phone within 4 business days after the clinic has received the results. If you do not hear from us within 7 days, please contact the clinic through TextHoghart or phone. If you have a critical or abnormal lab result, we will notify you by phone as soon as possible.  Submit refill requests through Cor[employer] or call your pharmacy and they will forward the refill request to us. Please allow 3 business days for your refill to be completed.          Additional Information About Your Visit        CorMatrix Information     Cor[employer] lets you send messages to your doctor, view your test results, renew your prescriptions, schedule appointments and more. To sign up, go to [email or web address] . Click on \"Log in\" on the left side of the screen, which will take you to the Welcome page. Then click on \"Sign up Now\" on the right side of the page.     You will be asked to enter the access code listed below, as well as some personal information. Please follow the directions to create your username and password.     Your access code is: [email or web address]  Expires: 2017  2:33 PM     Your access code will  in 90 days. If you need help or a new code, please call your [place] clinic or [phone].        Care EveryWhere ID  "    This is your Care EveryWhere ID. This could be used by other organizations to access your [place] medical records  [id number]        Your Vitals Were     Pulse Temperature Respirations Pulse Oximetry          66 97.8  F (36.6  C) (Oral) 16 99%         Blood Pressure from Last 3 Encounters:   08/16/17 131/78   08/16/17 131/78   08/09/17 125/68    Weight from Last 3 Encounters:   08/16/17 60.8 kg (134 lb)   08/09/17 61 kg (134 lb 6.4 oz)   08/02/17 59.9 kg (132 lb)              We Performed the Following     CBC with platelets differential          Where to get your medicines      Some of these will need a paper prescription and others can be bought over the counter.  Ask your nurse if you have questions.     Bring a paper prescription for each of these medications     oxyCODONE 15 MG IR tablet          Primary Care Provider Office Phone # Fax #    Addy Sean Frias [phone] [address] [facility] [address]  [facility] [address]        Equal Access to Services     HARINI [facility] : Hadii aad ku hadasho Soomaali, waaxda luqadaha, qaybta kaalmada adesergioyakira, hung dominique . So [facility] [phone].    ATENCIÓN: Si habla español, tiene a barlow disposición servicios gratuitos de asistencia lingüística. LlUniversity [facility] [phone].    We comply with applicable federal civil rights laws and [place] laws. We do not discriminate on the basis of race, color, national origin, age, disability sex, sexual orientation or gender identity.            Thank you!     Thank you for choosing [facility]  for your care. Our goal is always to provide you with excellent care. Hearing back from our patients is one way we can continue to improve our services. Please take a few minutes to complete the written survey that you may receive in the mail after your visit with us. Thank you!             Your Updated Medication List - Protect others around you: Learn how to safely  use, store and throw away your medicines at www.disposemymeds.org.          This list is accurate as of: 8/16/17 10:40 AM.  Always use your most recent med list.                   Brand Name Dispense Instructions for use Diagnosis    acyclovir 400 MG tablet    ZOVIRAX    60 tablet    Take 1 tablet (400 mg) by mouth 2 times daily Viral Prophylaxis.    Multiple myeloma not having achieved remission (H)       ASPIRIN NOT PRESCRIBED    INTENTIONAL    0 each    Antiplatelet medication not prescribed intentionally due to Current anticoagulant therapy (warfarin/enoxaparin)    Paroxysmal atrial fibrillation (H)       CALCIUM CITRATE + PO      Take 2,000 mg by mouth daily 2 tabs        carboxymethylcellulose 0.5 % Soln ophthalmic solution    REFRESH PLUS     1 drop 4 times daily        CLARINEX PO      Take by mouth daily Taking claritin        COMPAZINE PO      Take 10 mg by mouth daily as needed        cycloSPORINE 0.05 % ophthalmic emulsion    RESTASIS     Place 1 drop into both eyes every 12 hours        DAILY MULTIVITAMIN PO      Take 1 tablet by mouth daily.    Routine general medical examination at a health care facility       * dexamethasone 4 MG tablet    DECADRON    28 tablet    Take 20mg (5 tabs) by mouth every week the morning of velcade injection. Then take 1 tab (4mg) by mouth for two days after darzalex infusion.    Multiple myeloma not having achieved remission (H)       * dexamethasone 4 MG tablet    DECADRON    28 tablet    Take 20mg (5 tablets) PO every week on the morning of velcade injection. Then take 1 tablet (4mg) by mouth for two days after darzalex.    Multiple myeloma not having achieved remission (H)       erythromycin ophthalmic ointment    ROMYCIN     Place 1 Application into both eyes At Bedtime        GENTLE STOOL SOFTENER PO      Take 100 mg by mouth daily        lidocaine-prilocaine cream    EMLA    30 g    Apply topically as needed for moderate pain Apply dollop size amount to port site  30-60 min prior to accessing    Multiple myeloma not having achieved remission (H)       LORazepam 0.5 MG tablet    ATIVAN    20 tablet    Take 1 tablet (0.5 mg) by mouth every 8 hours as needed for anxiety    Multiple myeloma not having achieved remission (H)       metoprolol 50 MG 24 hr tablet    TOPROL-XL    180 tablet    Take 1 tablet (50 mg) by mouth 2 times daily    SVT (supraventricular tachycardia) (H), Paroxysmal atrial fibrillation (H)       oxyCODONE 15 MG IR tablet    ROXICODONE    90 tablet    Take 1 tablet (15 mg) by mouth every 8 hours as needed for pain maximum 4 tablet(s) per day    Multiple myeloma not having achieved remission (H)       polyethylene glycol powder    MIRALAX/GLYCOLAX     Take 1 capful by mouth daily as needed    Bilateral leg edema       timolol 0.25 % ophthalmic solution    TIMOPTIC     1 drop every morning        TYLENOL PO      Take 500 mg by mouth every 6 hours as needed for mild pain or fever        UNABLE TO FIND      MEDICATION NAME: Fresh Coat eye drops        VITAMIN D3 PO      Take 1,000 Units by mouth daily        warfarin 4 MG tablet    COUMADIN    110 tablet    TAKE ONE AND ONE-HALF TABLETS BY MOUTH ON MONDAY, WEDNESDAY, AND FRIDAY AND ONE TABLET THE OTHER DAYS OF THE WEEK    Paroxysmal atrial fibrillation (H), Long-term (current) use of anticoagulants       ZOMETA IV      Inject into the vein every 30 days Every 3 month dosing        * Notice:  This list has 2 medication(s) that are the same as other medications prescribed for you. Read the directions carefully, and ask your doctor or other care provider to review them with you.

## 2017-08-16 NOTE — PROGRESS NOTES
Infusion Nursing Note:  Amira Arreola presents today for C3D22 Velcade.    Patient seen by provider today: Yes: Dr. Roberts   present during visit today: Not Applicable.    Note: N/A.    Intravenous Access:  Lab draw site Right AC, Needle type Butterfly, Gauge 23.  Labs drawn without difficulty.    Treatment Conditions:  Lab Results   Component Value Date    HGB 12.7 08/16/2017     Lab Results   Component Value Date    WBC 5.5 08/16/2017      Lab Results   Component Value Date    ANEU 3.6 08/16/2017     Lab Results   Component Value Date     08/16/2017      Results reviewed, labs MET treatment parameters, ok to proceed with treatment.        Post Infusion Assessment:  Patient tolerated injection without incident.  Site patent and intact, free from redness, edema or discomfort.  No evidence of extravasations.  Access discontinued per protocol.    Discharge Plan:   Patient and/or family verbalized understanding of discharge instructions and all questions answered.  Copy of AVS reviewed with patient and/or family.  Patient will return 1 week for next appointment.  Patient discharged in stable condition accompanied by: .  Departure Mode: Ambulatory.    Yesi Cates RN

## 2017-08-17 RX ORDER — EPINEPHRINE 1 MG/ML
0.3 INJECTION INTRAMUSCULAR; INTRAVENOUS; SUBCUTANEOUS EVERY 5 MIN PRN
Status: CANCELLED | OUTPATIENT
Start: 2017-09-06

## 2017-08-17 RX ORDER — EPINEPHRINE 0.3 MG/.3ML
0.3 INJECTION SUBCUTANEOUS EVERY 5 MIN PRN
Status: CANCELLED | OUTPATIENT
Start: 2017-09-06

## 2017-08-17 RX ORDER — DIPHENHYDRAMINE HYDROCHLORIDE 50 MG/ML
50 INJECTION INTRAMUSCULAR; INTRAVENOUS
Status: CANCELLED
Start: 2017-08-23

## 2017-08-17 RX ORDER — MEPERIDINE HYDROCHLORIDE 50 MG/ML
25 INJECTION INTRAMUSCULAR; INTRAVENOUS; SUBCUTANEOUS EVERY 30 MIN PRN
Status: CANCELLED | OUTPATIENT
Start: 2017-09-13

## 2017-08-17 RX ORDER — HEPARIN SODIUM (PORCINE) LOCK FLUSH IV SOLN 100 UNIT/ML 100 UNIT/ML
5 SOLUTION INTRAVENOUS EVERY 8 HOURS
Status: CANCELLED
Start: 2017-09-06

## 2017-08-17 RX ORDER — ALBUTEROL SULFATE 0.83 MG/ML
2.5 SOLUTION RESPIRATORY (INHALATION)
Status: CANCELLED | OUTPATIENT
Start: 2017-08-30

## 2017-08-17 RX ORDER — MEPERIDINE HYDROCHLORIDE 50 MG/ML
25 INJECTION INTRAMUSCULAR; INTRAVENOUS; SUBCUTANEOUS EVERY 30 MIN PRN
Status: CANCELLED | OUTPATIENT
Start: 2017-08-30

## 2017-08-17 RX ORDER — ALBUTEROL SULFATE 90 UG/1
1-2 AEROSOL, METERED RESPIRATORY (INHALATION)
Status: CANCELLED
Start: 2017-08-23

## 2017-08-17 RX ORDER — EPINEPHRINE 0.3 MG/.3ML
0.3 INJECTION SUBCUTANEOUS EVERY 5 MIN PRN
Status: CANCELLED | OUTPATIENT
Start: 2017-08-30

## 2017-08-17 RX ORDER — MEPERIDINE HYDROCHLORIDE 50 MG/ML
25 INJECTION INTRAMUSCULAR; INTRAVENOUS; SUBCUTANEOUS EVERY 30 MIN PRN
Status: CANCELLED | OUTPATIENT
Start: 2017-08-23

## 2017-08-17 RX ORDER — SODIUM CHLORIDE 9 MG/ML
1000 INJECTION, SOLUTION INTRAVENOUS CONTINUOUS PRN
Status: CANCELLED
Start: 2017-09-13

## 2017-08-17 RX ORDER — ACETAMINOPHEN 325 MG/1
650 TABLET ORAL ONCE
Status: CANCELLED | OUTPATIENT
Start: 2017-09-06

## 2017-08-17 RX ORDER — SODIUM CHLORIDE 9 MG/ML
1000 INJECTION, SOLUTION INTRAVENOUS CONTINUOUS PRN
Status: CANCELLED
Start: 2017-08-30

## 2017-08-17 RX ORDER — ACETAMINOPHEN 325 MG/1
650 TABLET ORAL ONCE
Status: CANCELLED | OUTPATIENT
Start: 2017-08-23

## 2017-08-17 RX ORDER — SODIUM CHLORIDE 9 MG/ML
1000 INJECTION, SOLUTION INTRAVENOUS CONTINUOUS PRN
Status: CANCELLED
Start: 2017-08-23

## 2017-08-17 RX ORDER — EPINEPHRINE 0.3 MG/.3ML
0.3 INJECTION SUBCUTANEOUS EVERY 5 MIN PRN
Status: CANCELLED | OUTPATIENT
Start: 2017-08-23

## 2017-08-17 RX ORDER — LORAZEPAM 2 MG/ML
0.5 INJECTION INTRAMUSCULAR EVERY 4 HOURS PRN
Status: CANCELLED
Start: 2017-09-13

## 2017-08-17 RX ORDER — HEPARIN SODIUM (PORCINE) LOCK FLUSH IV SOLN 100 UNIT/ML 100 UNIT/ML
5 SOLUTION INTRAVENOUS EVERY 8 HOURS
Status: CANCELLED
Start: 2017-08-30

## 2017-08-17 RX ORDER — DIPHENHYDRAMINE HYDROCHLORIDE 50 MG/ML
50 INJECTION INTRAMUSCULAR; INTRAVENOUS
Status: CANCELLED
Start: 2017-08-30

## 2017-08-17 RX ORDER — METHYLPREDNISOLONE SODIUM SUCCINATE 125 MG/2ML
125 INJECTION, POWDER, LYOPHILIZED, FOR SOLUTION INTRAMUSCULAR; INTRAVENOUS
Status: CANCELLED
Start: 2017-08-30

## 2017-08-17 RX ORDER — DIPHENHYDRAMINE HYDROCHLORIDE 50 MG/ML
50 INJECTION INTRAMUSCULAR; INTRAVENOUS
Status: CANCELLED
Start: 2017-09-06

## 2017-08-17 RX ORDER — LORAZEPAM 2 MG/ML
0.5 INJECTION INTRAMUSCULAR EVERY 4 HOURS PRN
Status: CANCELLED
Start: 2017-09-06

## 2017-08-17 RX ORDER — ALBUTEROL SULFATE 90 UG/1
1-2 AEROSOL, METERED RESPIRATORY (INHALATION)
Status: CANCELLED
Start: 2017-09-13

## 2017-08-17 RX ORDER — METHYLPREDNISOLONE SODIUM SUCCINATE 125 MG/2ML
125 INJECTION, POWDER, LYOPHILIZED, FOR SOLUTION INTRAMUSCULAR; INTRAVENOUS
Status: CANCELLED
Start: 2017-08-23

## 2017-08-17 RX ORDER — ALBUTEROL SULFATE 0.83 MG/ML
2.5 SOLUTION RESPIRATORY (INHALATION)
Status: CANCELLED | OUTPATIENT
Start: 2017-08-23

## 2017-08-17 RX ORDER — EPINEPHRINE 1 MG/ML
0.3 INJECTION INTRAMUSCULAR; INTRAVENOUS; SUBCUTANEOUS EVERY 5 MIN PRN
Status: CANCELLED | OUTPATIENT
Start: 2017-09-13

## 2017-08-17 RX ORDER — ALBUTEROL SULFATE 0.83 MG/ML
2.5 SOLUTION RESPIRATORY (INHALATION)
Status: CANCELLED | OUTPATIENT
Start: 2017-09-06

## 2017-08-17 RX ORDER — EPINEPHRINE 1 MG/ML
0.3 INJECTION INTRAMUSCULAR; INTRAVENOUS; SUBCUTANEOUS EVERY 5 MIN PRN
Status: CANCELLED | OUTPATIENT
Start: 2017-08-30

## 2017-08-17 RX ORDER — METHYLPREDNISOLONE SODIUM SUCCINATE 125 MG/2ML
125 INJECTION, POWDER, LYOPHILIZED, FOR SOLUTION INTRAMUSCULAR; INTRAVENOUS
Status: CANCELLED
Start: 2017-09-06

## 2017-08-17 RX ORDER — ALBUTEROL SULFATE 90 UG/1
1-2 AEROSOL, METERED RESPIRATORY (INHALATION)
Status: CANCELLED
Start: 2017-09-06

## 2017-08-17 RX ORDER — HEPARIN SODIUM (PORCINE) LOCK FLUSH IV SOLN 100 UNIT/ML 100 UNIT/ML
5 SOLUTION INTRAVENOUS EVERY 8 HOURS
Status: CANCELLED
Start: 2017-09-13

## 2017-08-17 RX ORDER — DIPHENHYDRAMINE HCL 25 MG
50 CAPSULE ORAL ONCE
Status: CANCELLED | OUTPATIENT
Start: 2017-09-06

## 2017-08-17 RX ORDER — DIPHENHYDRAMINE HYDROCHLORIDE 50 MG/ML
50 INJECTION INTRAMUSCULAR; INTRAVENOUS
Status: CANCELLED
Start: 2017-09-13

## 2017-08-17 RX ORDER — EPINEPHRINE 1 MG/ML
0.3 INJECTION INTRAMUSCULAR; INTRAVENOUS; SUBCUTANEOUS EVERY 5 MIN PRN
Status: CANCELLED | OUTPATIENT
Start: 2017-08-23

## 2017-08-17 RX ORDER — ALBUTEROL SULFATE 0.83 MG/ML
2.5 SOLUTION RESPIRATORY (INHALATION)
Status: CANCELLED | OUTPATIENT
Start: 2017-09-13

## 2017-08-17 RX ORDER — LORAZEPAM 2 MG/ML
0.5 INJECTION INTRAMUSCULAR EVERY 4 HOURS PRN
Status: CANCELLED
Start: 2017-08-23

## 2017-08-17 RX ORDER — METHYLPREDNISOLONE SODIUM SUCCINATE 125 MG/2ML
125 INJECTION, POWDER, LYOPHILIZED, FOR SOLUTION INTRAMUSCULAR; INTRAVENOUS
Status: CANCELLED
Start: 2017-09-13

## 2017-08-17 RX ORDER — EPINEPHRINE 0.3 MG/.3ML
0.3 INJECTION SUBCUTANEOUS EVERY 5 MIN PRN
Status: CANCELLED | OUTPATIENT
Start: 2017-09-13

## 2017-08-17 RX ORDER — DIPHENHYDRAMINE HCL 25 MG
50 CAPSULE ORAL ONCE
Status: CANCELLED | OUTPATIENT
Start: 2017-08-23

## 2017-08-17 RX ORDER — MEPERIDINE HYDROCHLORIDE 50 MG/ML
25 INJECTION INTRAMUSCULAR; INTRAVENOUS; SUBCUTANEOUS EVERY 30 MIN PRN
Status: CANCELLED | OUTPATIENT
Start: 2017-09-06

## 2017-08-17 RX ORDER — LORAZEPAM 2 MG/ML
0.5 INJECTION INTRAMUSCULAR EVERY 4 HOURS PRN
Status: CANCELLED
Start: 2017-08-30

## 2017-08-17 RX ORDER — ALBUTEROL SULFATE 90 UG/1
1-2 AEROSOL, METERED RESPIRATORY (INHALATION)
Status: CANCELLED
Start: 2017-08-30

## 2017-08-17 RX ORDER — SODIUM CHLORIDE 9 MG/ML
1000 INJECTION, SOLUTION INTRAVENOUS CONTINUOUS PRN
Status: CANCELLED
Start: 2017-09-06

## 2017-08-17 RX ORDER — HEPARIN SODIUM (PORCINE) LOCK FLUSH IV SOLN 100 UNIT/ML 100 UNIT/ML
5 SOLUTION INTRAVENOUS EVERY 8 HOURS
Status: CANCELLED
Start: 2017-08-23

## 2017-08-18 ENCOUNTER — ANTICOAGULATION THERAPY VISIT (OUTPATIENT)
Dept: NURSING | Facility: CLINIC | Age: 82
End: 2017-08-18
Payer: COMMERCIAL

## 2017-08-18 DIAGNOSIS — Z79.01 LONG-TERM (CURRENT) USE OF ANTICOAGULANTS: ICD-10-CM

## 2017-08-18 LAB — INR POINT OF CARE: 3.3 (ref 0.86–1.14)

## 2017-08-18 PROCEDURE — 36416 COLLJ CAPILLARY BLOOD SPEC: CPT

## 2017-08-18 PROCEDURE — 85610 PROTHROMBIN TIME: CPT | Mod: QW

## 2017-08-18 PROCEDURE — 99207 ZZC NO CHARGE NURSE ONLY: CPT

## 2017-08-18 NOTE — MR AVS SNAPSHOT
Amira Arreola   8/18/2017 10:00 AM   Anticoagulation Therapy Visit    Description:  85 year old female   Provider:   ANTICOAGULATION CLINIC   Department:  Ec Nurse           INR as of 8/18/2017     Today's INR 3.3!      Anticoagulation Summary as of 8/18/2017     INR goal 2.0-3.0   Today's INR 3.3!   Full instructions 6 mg on Mon, Wed, Fri; 4 mg all other days   Next INR check 8/25/2017    Indications   Long-term (current) use of anticoagulants [Z79.01] [Z79.01]  Atrial fibrillation (H) [I48.91] (Resolved) [I48.91]         Description     INR 3.3 today.  Will increased green intake.  Continue with 6 mg Mon, Wed, Fri;  4 mg all other days = 34 mg weekly.  Recheck in 1 week.         Your next Anticoagulation Clinic appointment(s)     Aug 25, 2017 10:15 AM CDT   Anticoagulation Visit with  ANTICOAGULATION CLINIC   [facility]    [address] [place] [id number] [phone]              Contact Numbers     Clinic Number:         August 2017 Details    Sun Mon Tue Wed Thu Fri Sat       1               2               3               4               5                 6               7               8               9               10               11               12                 13               14               15               16               17               18      6 mg   See details      19      4 mg           20      4 mg         21      6 mg         22      4 mg         23      6 mg         24      4 mg         25            26                 27               28               29               30               31                  Date Details   08/18 This INR check       Date of next INR:  8/25/2017         How to take your warfarin dose     To take:  4 mg Take 1 of the 4 mg tablets.    To take:  6 mg Take 1.5 of the 4 mg tablets.

## 2017-08-18 NOTE — PROGRESS NOTES
ANTICOAGULATION FOLLOW-UP CLINIC VISIT    Patient Name:  Amira Arreola  Date:  8/18/2017  Contact Type:  Face to Face    SUBJECTIVE:     Patient Findings     Positives Change in diet/appetite    Comments Low appetite due to diarrhea.  getting better and will resume regular diet            OBJECTIVE    INR Protime   Date Value Ref Range Status   08/18/2017 3.3 (A) 0.86 - 1.14 Final       ASSESSMENT / PLAN  INR assessment SUPRA    Recheck INR In: 1 WEEK    INR Location Clinic      Anticoagulation Summary as of 8/18/2017     INR goal 2.0-3.0   Today's INR 3.3!   Maintenance plan 6 mg (4 mg x 1.5) on Mon, Wed, Fri; 4 mg (4 mg x 1) all other days   Full instructions 6 mg on Mon, Wed, Fri; 4 mg all other days   Weekly total 34 mg   No change documented Dayana Barrera RN   Plan last modified Dayana Barrera RN (3/10/2017)   Next INR check 8/25/2017   Target end date Indefinite    Indications   Long-term (current) use of anticoagulants [Z79.01] [Z79.01]  Atrial fibrillation (H) [I48.91] (Resolved) [I48.91]         Anticoagulation Episode Summary     INR check location     Preferred lab     Send INR reminders to  [facility]    Comments       Anticoagulation Care Providers     Provider Role Specialty Phone number    Addy Frias MD [facility] [phone]            See the Encounter Report to view Anticoagulation Flowsheet and Dosing Calendar (Go to Encounters tab in chart review, and find the Anticoagulation Therapy Visit)    Dosage adjustment made based on physician directed care plan.    INR 3.3 today.  Will increased green intake.  Continue with 6 mg Mon, Wed, Fri;  4 mg all other days = 34 mg weekly.  Recheck in 1 week.       Dayana Barrera RN

## 2017-08-22 RX ORDER — ZOLEDRONIC ACID 0.04 MG/ML
4 INJECTION, SOLUTION INTRAVENOUS ONCE
Status: CANCELLED | OUTPATIENT
Start: 2017-08-22 | End: 2017-08-22

## 2017-08-23 ENCOUNTER — INFUSION THERAPY VISIT (OUTPATIENT)
Dept: INFUSION THERAPY | Facility: CLINIC | Age: 82
End: 2017-08-23
Attending: INTERNAL MEDICINE
Payer: MEDICARE

## 2017-08-23 ENCOUNTER — HOSPITAL ENCOUNTER (OUTPATIENT)
Facility: CLINIC | Age: 82
Setting detail: SPECIMEN
Discharge: HOME | End: 2017-08-23
Attending: INTERNAL MEDICINE | Admitting: INTERNAL MEDICINE
Payer: MEDICARE

## 2017-08-23 VITALS
RESPIRATION RATE: 16 BRPM | WEIGHT: 132.4 LBS | HEIGHT: 64 IN | SYSTOLIC BLOOD PRESSURE: 130 MMHG | BODY MASS INDEX: 22.61 KG/M2 | DIASTOLIC BLOOD PRESSURE: 76 MMHG | TEMPERATURE: 98 F | HEART RATE: 73 BPM

## 2017-08-23 DIAGNOSIS — C79.51 CANCER, METASTATIC TO BONE (H): ICD-10-CM

## 2017-08-23 DIAGNOSIS — C90.00 MULTIPLE MYELOMA NOT HAVING ACHIEVED REMISSION (H): Primary | ICD-10-CM

## 2017-08-23 LAB
ALBUMIN SERPL-MCNC: 3.3 G/DL (ref 3.4–5)
ALP SERPL-CCNC: 40 U/L (ref 40–150)
ALT SERPL W P-5'-P-CCNC: 23 U/L (ref 0–50)
AST SERPL W P-5'-P-CCNC: 20 U/L (ref 0–45)
BASOPHILS # BLD AUTO: 0 10E9/L (ref 0–0.2)
BASOPHILS NFR BLD AUTO: 0.1 %
BILIRUB DIRECT SERPL-MCNC: 0.1 MG/DL (ref 0–0.2)
BILIRUB SERPL-MCNC: 0.5 MG/DL (ref 0.2–1.3)
CALCIUM SERPL-MCNC: 9.5 MG/DL (ref 8.5–10.1)
CREAT SERPL-MCNC: 0.65 MG/DL (ref 0.52–1.04)
DIFFERENTIAL METHOD BLD: ABNORMAL
EOSINOPHIL # BLD AUTO: 0 10E9/L (ref 0–0.7)
EOSINOPHIL NFR BLD AUTO: 0.4 %
ERYTHROCYTE [DISTWIDTH] IN BLOOD BY AUTOMATED COUNT: 14.5 % (ref 10–15)
GFR SERPL CREATININE-BSD FRML MDRD: 86 ML/MIN/1.7M2
HCT VFR BLD AUTO: 36.4 % (ref 35–47)
HGB BLD-MCNC: 12.4 G/DL (ref 11.7–15.7)
IMM GRANULOCYTES # BLD: 0 10E9/L (ref 0–0.4)
IMM GRANULOCYTES NFR BLD: 0.3 %
LYMPHOCYTES # BLD AUTO: 0.5 10E9/L (ref 0.8–5.3)
LYMPHOCYTES NFR BLD AUTO: 7.3 %
MCH RBC QN AUTO: 34.1 PG (ref 26.5–33)
MCHC RBC AUTO-ENTMCNC: 34.1 G/DL (ref 31.5–36.5)
MCV RBC AUTO: 100 FL (ref 78–100)
MONOCYTES # BLD AUTO: 0.2 10E9/L (ref 0–1.3)
MONOCYTES NFR BLD AUTO: 2.5 %
NEUTROPHILS # BLD AUTO: 6 10E9/L (ref 1.6–8.3)
NEUTROPHILS NFR BLD AUTO: 89.4 %
NRBC # BLD AUTO: 0 10*3/UL
NRBC BLD AUTO-RTO: 0 /100
PLATELET # BLD AUTO: 137 10E9/L (ref 150–450)
PROT SERPL-MCNC: 5.9 G/DL (ref 6.8–8.8)
RBC # BLD AUTO: 3.64 10E12/L (ref 3.8–5.2)
WBC # BLD AUTO: 6.7 10E9/L (ref 4–11)

## 2017-08-23 PROCEDURE — 83883 ASSAY NEPHELOMETRY NOT SPEC: CPT | Performed by: INTERNAL MEDICINE

## 2017-08-23 PROCEDURE — 00000402 ZZHCL STATISTIC TOTAL PROTEIN: Performed by: INTERNAL MEDICINE

## 2017-08-23 PROCEDURE — 85025 COMPLETE CBC W/AUTO DIFF WBC: CPT | Performed by: INTERNAL MEDICINE

## 2017-08-23 PROCEDURE — 25000128 H RX IP 250 OP 636: Performed by: INTERNAL MEDICINE

## 2017-08-23 PROCEDURE — 84165 PROTEIN E-PHORESIS SERUM: CPT | Performed by: INTERNAL MEDICINE

## 2017-08-23 PROCEDURE — 96401 CHEMO ANTI-NEOPL SQ/IM: CPT

## 2017-08-23 PROCEDURE — 80076 HEPATIC FUNCTION PANEL: CPT | Performed by: INTERNAL MEDICINE

## 2017-08-23 PROCEDURE — 96375 TX/PRO/DX INJ NEW DRUG ADDON: CPT

## 2017-08-23 PROCEDURE — 25000132 ZZH RX MED GY IP 250 OP 250 PS 637: Mod: GY | Performed by: INTERNAL MEDICINE

## 2017-08-23 PROCEDURE — 96415 CHEMO IV INFUSION ADDL HR: CPT

## 2017-08-23 PROCEDURE — 96413 CHEMO IV INFUSION 1 HR: CPT

## 2017-08-23 PROCEDURE — 82565 ASSAY OF CREATININE: CPT | Performed by: INTERNAL MEDICINE

## 2017-08-23 PROCEDURE — 96367 TX/PROPH/DG ADDL SEQ IV INF: CPT

## 2017-08-23 PROCEDURE — 82310 ASSAY OF CALCIUM: CPT | Performed by: INTERNAL MEDICINE

## 2017-08-23 PROCEDURE — A9270 NON-COVERED ITEM OR SERVICE: HCPCS | Mod: GY | Performed by: INTERNAL MEDICINE

## 2017-08-23 RX ORDER — DEXAMETHASONE 4 MG/1
TABLET ORAL
Qty: 28 TABLET | Refills: 0 | Status: SHIPPED | OUTPATIENT
Start: 2017-08-23 | End: 2017-08-23

## 2017-08-23 RX ORDER — DEXAMETHASONE 4 MG/1
TABLET ORAL
Qty: 28 TABLET | Refills: 0 | Status: SHIPPED | OUTPATIENT
Start: 2017-08-23 | End: 2017-09-06

## 2017-08-23 RX ORDER — HEPARIN SODIUM (PORCINE) LOCK FLUSH IV SOLN 100 UNIT/ML 100 UNIT/ML
5 SOLUTION INTRAVENOUS EVERY 8 HOURS
Status: DISCONTINUED | OUTPATIENT
Start: 2017-08-23 | End: 2017-08-23 | Stop reason: HOSPADM

## 2017-08-23 RX ORDER — ACETAMINOPHEN 325 MG/1
650 TABLET ORAL ONCE
Status: COMPLETED | OUTPATIENT
Start: 2017-08-23 | End: 2017-08-23

## 2017-08-23 RX ORDER — ZOLEDRONIC ACID 0.04 MG/ML
4 INJECTION, SOLUTION INTRAVENOUS ONCE
Status: COMPLETED | OUTPATIENT
Start: 2017-08-23 | End: 2017-08-23

## 2017-08-23 RX ADMIN — DIPHENHYDRAMINE HYDROCHLORIDE 50 MG: 50 INJECTION, SOLUTION INTRAMUSCULAR; INTRAVENOUS at 09:55

## 2017-08-23 RX ADMIN — SODIUM CHLORIDE, PRESERVATIVE FREE 5 ML: 5 INJECTION INTRAVENOUS at 14:04

## 2017-08-23 RX ADMIN — DEXAMETHASONE SODIUM PHOSPHATE 12 MG: 10 INJECTION, SOLUTION INTRAMUSCULAR; INTRAVENOUS at 09:37

## 2017-08-23 RX ADMIN — ACETAMINOPHEN 650 MG: 325 TABLET ORAL at 10:14

## 2017-08-23 RX ADMIN — SODIUM CHLORIDE 250 ML: 9 INJECTION, SOLUTION INTRAVENOUS at 09:37

## 2017-08-23 RX ADMIN — BORTEZOMIB 2.2 MG: 3.5 INJECTION, POWDER, LYOPHILIZED, FOR SOLUTION INTRAVENOUS; SUBCUTANEOUS at 14:11

## 2017-08-23 RX ADMIN — DARATUMUMAB 1000 MG: 100 INJECTION, SOLUTION, CONCENTRATE INTRAVENOUS at 10:33

## 2017-08-23 RX ADMIN — ZOLEDRONIC ACID 4 MG: 4 INJECTION, SOLUTION, CONCENTRATE INTRAVENOUS at 10:12

## 2017-08-23 ASSESSMENT — PAIN SCALES - GENERAL: PAINLEVEL: NO PAIN (0)

## 2017-08-23 NOTE — MR AVS SNAPSHOT
After Visit Summary   8/23/2017    Amira Arreola    MRN: [id number]           Patient Information     Date Of Birth          7/17/1932        Visit Information        Provider Department      8/23/2017 8:30 AM  INFUSION CHAIR 7 [facility]        Today's Diagnoses     Multiple myeloma not having achieved remission (H)    -  1    Cancer, metastatic to bone (H)           Follow-ups after your visit        Your next 10 appointments already scheduled     Aug 25, 2017 10:15 AM CDT   Anticoagulation Visit with [facility] ANTICOAGULATION CLINIC   [facility] ([address] [address]   [phone]            Aug 30, 2017  9:30 AM CDT   Level 2 with SH INFUSION CHAIR 12   [facility] ([facility])    [facility] [place] [id number]  [place] [address]   [phone]            Sep 06, 2017  9:00 AM CDT   Level 4 with SH INFUSION CHAIR 18   [facility] ([facility])    [facility]  [address]  [place] [address]   [phone]            Sep 13, 2017  9:00 AM CDT   Level 2 with SH INFUSION CHAIR 7   [facility] ([facility])    [facility]  [address]  [place] [address]   [phone]            Sep 13, 2017  9:30 AM CDT   Return Visit with Shayne Roberts MD   [facility] ([facility])    [facility]  [address]  [place] [address]   [phone]            Sep 20, 2017  9:00 AM CDT   Level 4 with SH INFUSION CHAIR 14   [facility] ([facility])    [facility]  [address]  [place] [address]   [phone]            Sep  "2017 10:45 AM CDT   Return Visit with Ramos Rivera MD   [facility] [place] ([facility])    [id number]  [place] [address] [phone]              Who to contact     If you have questions or need follow up information about today's clinic visit or your schedule please contact [facility] directly at [phone].  Normal or non-critical lab and imaging results will be communicated to you by [employer], letter or phone within 4 business days after the clinic has received the results. If you do not hear from us within 7 days, please contact the clinic through [employer] or phone. If you have a critical or abnormal lab result, we will notify you by phone as soon as possible.  Submit refill requests through [employer] or call your pharmacy and they will forward the refill request to us. Please allow 3 business days for your refill to be completed.          Additional Information About Your Visit        [employer] Information     [employer] lets you send messages to your doctor, view your test results, renew your prescriptions, schedule appointments and more. To sign up, go to [email or web address] . Click on \"Log in\" on the left side of the screen, which will take you to the Welcome page. Then click on \"Sign up Now\" on the right side of the page.     You will be asked to enter the access code listed below, as well as some personal information. Please follow the directions to create your username and password.     Your access code is: [email or web address]  Expires: 2017  2:33 PM     Your access code will  in 90 days. If you need help or a new code, please call your [facility] clinic or [phone].        Care EveryWhere ID     This is your Care EveryWhere ID. This could be used by other organizations to access your [facility] medical records  [id number]        Your Vitals Were     Pulse Temperature Respirations Height BMI " "(Body Mass Index)       73 98  F (36.7  C) (Oral) 16 1.626 m (5' 4\") 22.73 kg/m2        Blood Pressure from Last 3 Encounters:   08/23/17 (P) 126/74   08/16/17 131/78   08/16/17 131/78    Weight from Last 3 Encounters:   08/23/17 60.1 kg (132 lb 6.4 oz)   08/16/17 60.8 kg (134 lb)   08/09/17 61 kg (134 lb 6.4 oz)              We Performed the Following     Calcium     CBC with platelets differential     Creatinine     Hepatic panel     Kappa and lambda light chain     Protein electrophoresis          Today's Medication Changes          These changes are accurate as of: 8/23/17  3:06 PM.  If you have any questions, ask your nurse or doctor.               These medicines have changed or have updated prescriptions.        Dose/Directions    * dexamethasone 4 MG tablet   Commonly known as:  DECADRON   This may have changed:  Another medication with the same name was added. Make sure you understand how and when to take each.   Used for:  Multiple myeloma not having achieved remission (H)        Take 20mg (5 tabs) by mouth every week the morning of velcade injection. Then take 1 tab (4mg) by mouth for two days after darzalex infusion.   Quantity:  28 tablet   Refills:  0       * dexamethasone 4 MG tablet   Commonly known as:  DECADRON   This may have changed:  Another medication with the same name was added. Make sure you understand how and when to take each.   Used for:  Multiple myeloma not having achieved remission (H)        Take 20mg (5 tablets) PO every week on the morning of velcade injection. Then take 1 tablet (4mg) by mouth for two days after darzalex.   Quantity:  28 tablet   Refills:  0       * dexamethasone 4 MG tablet   Commonly known as:  DECADRON   This may have changed:  You were already taking a medication with the same name, and this prescription was added. Make sure you understand how and when to take each.   Used for:  Multiple myeloma not having achieved remission (H)        Take 20mg (5 tablets) by " mouth every week on the morning of velcade injection.   Quantity:  28 tablet   Refills:  0       * Notice:  This list has 3 medication(s) that are the same as other medications prescribed for you. Read the directions carefully, and ask your doctor or other care provider to review them with you.         Where to get your medicines      These medications were sent to [id number] - [place], [place] - [address] AT [facility] [address]  [address], [place] [address]     Phone:  [phone]     dexamethasone 4 MG tablet                Primary Care Provider Office Phone # Fax #    Addy Frias [phone] [address] [address]        Equal Access to Services     SARA ZELAYA AH: Hadii liane murcia hadasho Soomaali, waaxda luqadaha, qaybta kaalmada adeegyada, hung dawn haycesar dominique . So [facility] [phone].    ATENCIÓN: Si habla español, tiene a barlow disposición servicios gratuitos de asistencia lingüística. Llame al [phone].    We comply with applicable federal civil rights laws and [place] laws. We do not discriminate on the basis of race, color, national origin, age, disability sex, sexual orientation or gender identity.            Thank you!     Thank you for choosing [facility]  for your care. Our goal is always to provide you with excellent care. Hearing back from our patients is one way we can continue to improve our services. Please take a few minutes to complete the written survey that you may receive in the mail after your visit with us. Thank you!             Your Updated Medication List - Protect others around you: Learn how to safely use, store and throw away your medicines at www.disposemymeds.org.          This list is accurate as of: 8/23/17  3:06 PM.  Always use your most recent med list.                   Brand Name Dispense Instructions for use Diagnosis    acyclovir 400 MG tablet     ZOVIRAX    60 tablet    Take 1 tablet (400 mg) by mouth 2 times daily Viral Prophylaxis.    Multiple myeloma not having achieved remission (H)       ASPIRIN NOT PRESCRIBED    INTENTIONAL    0 each    Antiplatelet medication not prescribed intentionally due to Current anticoagulant therapy (warfarin/enoxaparin)    Paroxysmal atrial fibrillation (H)       CALCIUM CITRATE + PO      Take 2,000 mg by mouth daily 2 tabs        carboxymethylcellulose 0.5 % Soln ophthalmic solution    REFRESH PLUS     1 drop 4 times daily        CLARINEX PO      Take by mouth daily Taking claritin        COMPAZINE PO      Take 10 mg by mouth daily as needed        cycloSPORINE 0.05 % ophthalmic emulsion    RESTASIS     Place 1 drop into both eyes every 12 hours        DAILY MULTIVITAMIN PO      Take 1 tablet by mouth daily.    Routine general medical examination at a health care facility       * dexamethasone 4 MG tablet    DECADRON    28 tablet    Take 20mg (5 tabs) by mouth every week the morning of velcade injection. Then take 1 tab (4mg) by mouth for two days after darzalex infusion.    Multiple myeloma not having achieved remission (H)       * dexamethasone 4 MG tablet    DECADRON    28 tablet    Take 20mg (5 tablets) PO every week on the morning of velcade injection. Then take 1 tablet (4mg) by mouth for two days after darzalex.    Multiple myeloma not having achieved remission (H)       * dexamethasone 4 MG tablet    DECADRON    28 tablet    Take 20mg (5 tablets) by mouth every week on the morning of velcade injection.    Multiple myeloma not having achieved remission (H)       erythromycin ophthalmic ointment    ROMYCIN     Place 1 Application into both eyes At Bedtime        GENTLE STOOL SOFTENER PO      Take 100 mg by mouth daily        lidocaine-prilocaine cream    EMLA    30 g    Apply topically as needed for moderate pain Apply dollop size amount to port site 30-60 min prior to accessing    Multiple myeloma not having  achieved remission (H)       LORazepam 0.5 MG tablet    ATIVAN    20 tablet    Take 1 tablet (0.5 mg) by mouth every 8 hours as needed for anxiety    Multiple myeloma not having achieved remission (H)       metoprolol 50 MG 24 hr tablet    TOPROL-XL    180 tablet    Take 1 tablet (50 mg) by mouth 2 times daily    SVT (supraventricular tachycardia) (H), Paroxysmal atrial fibrillation (H)       oxyCODONE 15 MG IR tablet    ROXICODONE    90 tablet    Take 1 tablet (15 mg) by mouth every 8 hours as needed for pain maximum 4 tablet(s) per day    Multiple myeloma not having achieved remission (H)       polyethylene glycol powder    MIRALAX/GLYCOLAX     Take 1 capful by mouth daily as needed    Bilateral leg edema       timolol 0.25 % ophthalmic solution    TIMOPTIC     1 drop every morning        TYLENOL PO      Take 500 mg by mouth every 6 hours as needed for mild pain or fever        UNABLE TO FIND      MEDICATION NAME: Fresh Coat eye drops        VITAMIN D3 PO      Take 1,000 Units by mouth daily        warfarin 4 MG tablet    COUMADIN    110 tablet    TAKE ONE AND ONE-HALF TABLETS BY MOUTH ON MONDAY, WEDNESDAY, AND FRIDAY AND ONE TABLET THE OTHER DAYS OF THE WEEK    Paroxysmal atrial fibrillation (H), Long-term (current) use of anticoagulants       ZOMETA IV      Inject into the vein every 30 days Every 3 month dosing        * Notice:  This list has 3 medication(s) that are the same as other medications prescribed for you. Read the directions carefully, and ask your doctor or other care provider to review them with you.

## 2017-08-23 NOTE — PROGRESS NOTES
Infusion Nursing Note:  Amira Arreola presents today for darzalex,zometa, velcade.    Patient seen by provider today: No   present during visit today: Not Applicable.    Note: N/A.    Intravenous Access:  Implanted Port.    Treatment Conditions:  Lab Results   Component Value Date    HGB 12.4 08/23/2017     Lab Results   Component Value Date    WBC 6.7 08/23/2017      Lab Results   Component Value Date    ANEU 6.0 08/23/2017     Lab Results   Component Value Date     08/23/2017      Results reviewed, labs MET treatment parameters, ok to proceed with treatment.  Calc/creat WNL.        Post Infusion Assessment:  Patient tolerated infusion without incident.  Patient tolerated injection without incident.  Blood return noted pre and post infusion.  Site patent and intact, free from redness, edema or discomfort.  No evidence of extravasations.  Access discontinued per protocol.    Discharge Plan:   Discharge instructions reviewed with: Patient.  Patient and/or family verbalized understanding of discharge instructions and all questions answered.  Copy of AVS reviewed with patient and/or family. Patient discharged in stable condition accompanied by: self.  Departure Mode: Ambulatory to front of this [facility] where a friend will pick her up.    Mera Johnson RN

## 2017-08-24 LAB
ALBUMIN SERPL ELPH-MCNC: 3.7 G/DL (ref 3.7–5.1)
ALPHA1 GLOB SERPL ELPH-MCNC: 0.3 G/DL (ref 0.2–0.4)
ALPHA2 GLOB SERPL ELPH-MCNC: 0.6 G/DL (ref 0.5–0.9)
B-GLOBULIN SERPL ELPH-MCNC: 0.6 G/DL (ref 0.6–1)
GAMMA GLOB SERPL ELPH-MCNC: 0.3 G/DL (ref 0.7–1.6)
KAPPA LC UR-MCNC: 8.29 MG/DL (ref 0.33–1.94)
KAPPA LC/LAMBDA SER: 17.27 {RATIO} (ref 0.26–1.65)
LAMBDA LC SERPL-MCNC: 0.37 MG/DL (ref 0.57–2.63)
M PROTEIN SERPL ELPH-MCNC: 0.1 G/DL
PROT PATTERN SERPL ELPH-IMP: ABNORMAL

## 2017-08-25 ENCOUNTER — ANTICOAGULATION THERAPY VISIT (OUTPATIENT)
Dept: NURSING | Facility: CLINIC | Age: 82
End: 2017-08-25
Payer: COMMERCIAL

## 2017-08-25 DIAGNOSIS — Z79.01 LONG-TERM (CURRENT) USE OF ANTICOAGULANTS: ICD-10-CM

## 2017-08-25 LAB — INR POINT OF CARE: 3.8 (ref 0.86–1.14)

## 2017-08-25 PROCEDURE — 36416 COLLJ CAPILLARY BLOOD SPEC: CPT

## 2017-08-25 PROCEDURE — 85610 PROTHROMBIN TIME: CPT | Mod: QW

## 2017-08-25 PROCEDURE — 99207 ZZC NO CHARGE NURSE ONLY: CPT

## 2017-08-25 NOTE — MR AVS SNAPSHOT
Amira Arreola   8/25/2017 10:15 AM   Anticoagulation Therapy Visit    Description:  85 year old female   Provider:   ANTICOAGULATION CLINIC   Department:  Ec Nurse           INR as of 8/25/2017     Today's INR 3.8!      Anticoagulation Summary as of 8/25/2017     INR goal 2.0-3.0   Today's INR 3.8!   Full instructions 8/25: 2 mg; 8/30: 4 mg; Otherwise 6 mg on Mon, Wed, Fri; 4 mg all other days   Next INR check 8/31/2017    Indications   Long-term (current) use of anticoagulants [Z79.01] [Z79.01]  Atrial fibrillation (H) [I48.91] (Resolved) [I48.91]         Your next Anticoagulation Clinic appointment(s)     Aug 31, 2017  9:00 AM CDT   Anticoagulation Visit with  ANTICOAGULATION CLINIC   [facility] ([facility])    [address] [place] [address]   [phone]              Contact Numbers     Clinic Number:         August 2017 Details    Sun Mon Tue Wed Thu Fri Sat       1               2               3               4               5                 6               7               8               9               10               11               12                 13               14               15               16               17               18               19                 20               21               22               23               24               25      2 mg   See details      26      4 mg           27      4 mg         28      6 mg         29      4 mg         30      4 mg         31               Date Details   08/25 This INR check       Date of next INR:  8/31/2017         How to take your warfarin dose     To take:  2 mg Take 0.5 of a 4 mg tablet.    To take:  4 mg Take 1 of the 4 mg tablets.    To take:  6 mg Take 1.5 of the 4 mg tablets.

## 2017-08-30 ENCOUNTER — INFUSION THERAPY VISIT (OUTPATIENT)
Dept: INFUSION THERAPY | Facility: CLINIC | Age: 82
End: 2017-08-30
Attending: INTERNAL MEDICINE
Payer: MEDICARE

## 2017-08-30 ENCOUNTER — HOSPITAL ENCOUNTER (OUTPATIENT)
Facility: CLINIC | Age: 82
Setting detail: SPECIMEN
Discharge: HOME | End: 2017-08-30
Attending: INTERNAL MEDICINE | Admitting: INTERNAL MEDICINE
Payer: MEDICARE

## 2017-08-30 VITALS
WEIGHT: 132.4 LBS | RESPIRATION RATE: 16 BRPM | DIASTOLIC BLOOD PRESSURE: 77 MMHG | HEART RATE: 76 BPM | TEMPERATURE: 98.3 F | OXYGEN SATURATION: 99 % | BODY MASS INDEX: 22.73 KG/M2 | SYSTOLIC BLOOD PRESSURE: 126 MMHG

## 2017-08-30 DIAGNOSIS — Z95.828 PORTACATH IN PLACE: ICD-10-CM

## 2017-08-30 DIAGNOSIS — C90.00 MULTIPLE MYELOMA NOT HAVING ACHIEVED REMISSION (H): Primary | ICD-10-CM

## 2017-08-30 LAB
BASOPHILS # BLD AUTO: 0 10E9/L (ref 0–0.2)
BASOPHILS NFR BLD AUTO: 0 %
DIFFERENTIAL METHOD BLD: ABNORMAL
EOSINOPHIL # BLD AUTO: 0.1 10E9/L (ref 0–0.7)
EOSINOPHIL NFR BLD AUTO: 0.9 %
ERYTHROCYTE [DISTWIDTH] IN BLOOD BY AUTOMATED COUNT: 14.2 % (ref 10–15)
HCT VFR BLD AUTO: 36.5 % (ref 35–47)
HGB BLD-MCNC: 12.7 G/DL (ref 11.7–15.7)
IMM GRANULOCYTES # BLD: 0 10E9/L (ref 0–0.4)
IMM GRANULOCYTES NFR BLD: 0.2 %
LYMPHOCYTES # BLD AUTO: 1.2 10E9/L (ref 0.8–5.3)
LYMPHOCYTES NFR BLD AUTO: 18.3 %
MCH RBC QN AUTO: 34.7 PG (ref 26.5–33)
MCHC RBC AUTO-ENTMCNC: 34.8 G/DL (ref 31.5–36.5)
MCV RBC AUTO: 100 FL (ref 78–100)
MONOCYTES # BLD AUTO: 0.9 10E9/L (ref 0–1.3)
MONOCYTES NFR BLD AUTO: 14.4 %
NEUTROPHILS # BLD AUTO: 4.2 10E9/L (ref 1.6–8.3)
NEUTROPHILS NFR BLD AUTO: 66.2 %
NRBC # BLD AUTO: 0 10*3/UL
NRBC BLD AUTO-RTO: 0 /100
PLATELET # BLD AUTO: 151 10E9/L (ref 150–450)
RBC # BLD AUTO: 3.66 10E12/L (ref 3.8–5.2)
WBC # BLD AUTO: 6.4 10E9/L (ref 4–11)

## 2017-08-30 PROCEDURE — 85025 COMPLETE CBC W/AUTO DIFF WBC: CPT | Performed by: INTERNAL MEDICINE

## 2017-08-30 PROCEDURE — 96401 CHEMO ANTI-NEOPL SQ/IM: CPT

## 2017-08-30 PROCEDURE — 25000128 H RX IP 250 OP 636: Performed by: INTERNAL MEDICINE

## 2017-08-30 RX ORDER — HEPARIN SODIUM (PORCINE) LOCK FLUSH IV SOLN 100 UNIT/ML 100 UNIT/ML
500 SOLUTION INTRAVENOUS EVERY 8 HOURS
Status: DISCONTINUED | OUTPATIENT
Start: 2017-08-30 | End: 2017-08-30 | Stop reason: HOSPADM

## 2017-08-30 RX ORDER — HEPARIN SODIUM (PORCINE) LOCK FLUSH IV SOLN 100 UNIT/ML 100 UNIT/ML
500 SOLUTION INTRAVENOUS EVERY 8 HOURS
Status: CANCELLED
Start: 2017-08-30

## 2017-08-30 RX ADMIN — SODIUM CHLORIDE, PRESERVATIVE FREE 500 UNITS: 5 INJECTION INTRAVENOUS at 10:09

## 2017-08-30 RX ADMIN — BORTEZOMIB 2.2 MG: 3.5 INJECTION, POWDER, LYOPHILIZED, FOR SOLUTION INTRAVENOUS; SUBCUTANEOUS at 10:58

## 2017-08-30 ASSESSMENT — PAIN SCALES - GENERAL: PAINLEVEL: NO PAIN (0)

## 2017-08-30 NOTE — MR AVS SNAPSHOT
After Visit Summary   8/30/2017    Amira Arreola    MRN: [id number]           Patient Information     Date Of Birth          7/17/1932        Visit Information        Provider Department      8/30/2017 9:30 AM  INFUSION CHAIR 12 [facility]        Today's Diagnoses     Multiple myeloma not having achieved remission (H)    -  1    Portacath in place           Follow-ups after your visit        Your next 10 appointments already scheduled     Aug 31, 2017  9:00 AM CDT   Anticoagulation Visit with [facility] ANTICOAGULATION CLINIC   [facility] ([address] [address]   [phone]            Sep 06, 2017  9:00 AM CDT   Level 4 with  INFUSION CHAIR 18   [facility] ([facility])    [facility]  [address]  [place] [address]   [phone]            Sep 13, 2017  9:00 AM CDT   Level 2 with  INFUSION CHAIR 7   [facility] ([facility])    [facility] [place]  [address]  [place] [address] [phone]            Sep 13, 2017  9:30 AM CDT   Return Visit with Shayne Roberts MD   [facility] ([facility])    [facility]  [address]  [place] [address] [phone]            Sep 20, 2017  9:00 AM CDT   Level 4 with  INFUSION CHAIR 14   [facility] ([facility])    [facility] [place]  [address]  [place] [address]   [phone]            Sep 21, 2017 10:45 AM CDT   Return Visit with Ramos Rivera MD   [facility] [place] ([facility])    [address]  [place] [address] [phone]              Who to contact     If you have  "questions or need follow up information about today's clinic visit or your schedule please contact [facility] directly at [phone].  Normal or non-critical lab and imaging results will be communicated to you by Praedicathart, letter or phone within 4 business days after the clinic has received the results. If you do not hear from us within 7 days, please contact the clinic through Praedicathart or phone. If you have a critical or abnormal lab result, we will notify you by phone as soon as possible.  Submit refill requests through coRank or call your pharmacy and they will forward the refill request to us. Please allow 3 business days for your refill to be completed.          Additional Information About Your Visit        PraedicatharKeukey Information     coRank lets you send messages to your doctor, view your test results, renew your prescriptions, schedule appointments and more. To sign up, go to [email or web address] . Click on \"Log in\" on the left side of the screen, which will take you to the Welcome page. Then click on \"Sign up Now\" on the right side of the page.     You will be asked to enter the access code listed below, as well as some personal information. Please follow the directions to create your username and password.     Your access code is: [email or web address]  Expires: 2017  2:33 PM     Your access code will  in 90 days. If you need help or a new code, please call your [facility] clinic or [phone].        Care EveryWhere ID     This is your Care EveryWhere ID. This could be used by other organizations to access your [facility] medical records  [id number]        Your Vitals Were     Pulse Temperature Respirations Pulse Oximetry BMI (Body Mass Index)       76 98.3  F (36.8  C) (Oral) 16 99% 22.73 kg/m2        Blood Pressure from Last 3 Encounters:   17 126/77   17 (P) 126/74   17 131/78    Weight from Last 3 Encounters:   17 60.1 kg (132 lb 6.4 oz) "   08/23/17 60.1 kg (132 lb 6.4 oz)   08/16/17 60.8 kg (134 lb)              We Performed the Following     CBC with platelets differential        Primary Care Provider Office Phone # Fax #    Addy Frias [phone] [address]  [place] [address]        Equal Access to Services     [facility]: Hadii aad ku hadasho Soomaali, waaxda luqadaha, qaybta kaalmada adeegyada, waxay idiin hayaan adeeg khedgardosh la'aan ah. So [facility] [phone].    ATENCIÓN: Si habla espmisa, tiene a barlow disposición servicios gratuitos de asistencia lingüística. [facility] [phone].    We comply with applicable federal civil rights laws and [place] laws. We do not discriminate on the basis of race, color, national origin, age, disability sex, sexual orientation or gender identity.            Thank you!     Thank you for choosing [facility]  for your care. Our goal is always to provide you with excellent care. Hearing back from our patients is one way we can continue to improve our services. Please take a few minutes to complete the written survey that you may receive in the mail after your visit with us. Thank you!             Your Updated Medication List - Protect others around you: Learn how to safely use, store and throw away your medicines at www.disposemymeds.org.          This list is accurate as of: 8/30/17 11:16 AM.  Always use your most recent med list.                   Brand Name Dispense Instructions for use Diagnosis    acyclovir 400 MG tablet    ZOVIRAX    60 tablet    Take 1 tablet (400 mg) by mouth 2 times daily Viral Prophylaxis.    Multiple myeloma not having achieved remission (H)       ASPIRIN NOT PRESCRIBED    INTENTIONAL    0 each    Antiplatelet medication not prescribed intentionally due to Current anticoagulant therapy (warfarin/enoxaparin)    Paroxysmal atrial fibrillation (H)       CALCIUM CITRATE + PO      Take 2,000 mg by mouth daily 2  tabs        carboxymethylcellulose 0.5 % Soln ophthalmic solution    REFRESH PLUS     1 drop 4 times daily        CLARINEX PO      Take by mouth daily Taking claritin        COMPAZINE PO      Take 10 mg by mouth daily as needed        cycloSPORINE 0.05 % ophthalmic emulsion    RESTASIS     Place 1 drop into both eyes every 12 hours        DAILY MULTIVITAMIN PO      Take 1 tablet by mouth daily.    Routine general medical examination at a health care facility       * dexamethasone 4 MG tablet    DECADRON    28 tablet    Take 20mg (5 tabs) by mouth every week the morning of velcade injection. Then take 1 tab (4mg) by mouth for two days after darzalex infusion.    Multiple myeloma not having achieved remission (H)       * dexamethasone 4 MG tablet    DECADRON    28 tablet    Take 20mg (5 tablets) PO every week on the morning of velcade injection. Then take 1 tablet (4mg) by mouth for two days after darzalex.    Multiple myeloma not having achieved remission (H)       * dexamethasone 4 MG tablet    DECADRON    28 tablet    Take 20mg (5 tablets) by mouth every week on the morning of velcade injection.    Multiple myeloma not having achieved remission (H)       erythromycin ophthalmic ointment    ROMYCIN     Place 1 Application into both eyes At Bedtime        GENTLE STOOL SOFTENER PO      Take 100 mg by mouth daily        lidocaine-prilocaine cream    EMLA    30 g    Apply topically as needed for moderate pain Apply dollop size amount to port site 30-60 min prior to accessing    Multiple myeloma not having achieved remission (H)       LORazepam 0.5 MG tablet    ATIVAN    20 tablet    Take 1 tablet (0.5 mg) by mouth every 8 hours as needed for anxiety    Multiple myeloma not having achieved remission (H)       metoprolol 50 MG 24 hr tablet    TOPROL-XL    180 tablet    Take 1 tablet (50 mg) by mouth 2 times daily    SVT (supraventricular tachycardia) (H), Paroxysmal atrial fibrillation (H)       oxyCODONE 15 MG IR tablet     ROXICODONE    90 tablet    Take 1 tablet (15 mg) by mouth every 8 hours as needed for pain maximum 4 tablet(s) per day    Multiple myeloma not having achieved remission (H)       polyethylene glycol powder    MIRALAX/GLYCOLAX     Take 1 capful by mouth daily as needed    Bilateral leg edema       timolol 0.25 % ophthalmic solution    TIMOPTIC     1 drop every morning        TYLENOL PO      Take 500 mg by mouth every 6 hours as needed for mild pain or fever        UNABLE TO FIND      MEDICATION NAME: Fresh Coat eye drops        VITAMIN D3 PO      Take 1,000 Units by mouth daily        warfarin 4 MG tablet    COUMADIN    110 tablet    TAKE ONE AND ONE-HALF TABLETS BY MOUTH ON MONDAY, WEDNESDAY, AND FRIDAY AND ONE TABLET THE OTHER DAYS OF THE WEEK    Paroxysmal atrial fibrillation (H), Long-term (current) use of anticoagulants       ZOMETA IV      Inject into the vein every 30 days Every 3 month dosing        * Notice:  This list has 3 medication(s) that are the same as other medications prescribed for you. Read the directions carefully, and ask your doctor or other care provider to review them with you.

## 2017-08-30 NOTE — PROGRESS NOTES
Infusion Nursing Note:  Amira Arreola presents today for D8,C4 Velcade.    Patient seen by provider today: No   present during visit today: Not Applicable.    Note: No concerns or changes to report.    Intravenous Access:  Labs drawn without difficulty.  Implanted Port.    Treatment Conditions:  Lab Results   Component Value Date    HGB 12.7 08/30/2017     Lab Results   Component Value Date    WBC 6.4 08/30/2017      Lab Results   Component Value Date    ANEU 4.2 08/30/2017     Lab Results   Component Value Date     08/30/2017      Results reviewed, labs MET treatment parameters, ok to proceed with treatment.        Post Infusion Assessment:  Patient tolerated injection without incident.  Site patent and intact, free from redness, edema or discomfort.  No evidence of extravasations.  Access discontinued per protocol.    Discharge Plan:   Discharge instructions reviewed with: Patient.  Patient and/or family verbalized understanding of discharge instructions and all questions answered.  Copy of AVS reviewed with patient and/or family.  Patient will return 9/6 for next appointment.  Patient discharged in stable condition accompanied by: self.  Departure Mode: Ambulatory.    Rosangela Reese RN

## 2017-08-31 ENCOUNTER — ANTICOAGULATION THERAPY VISIT (OUTPATIENT)
Dept: NURSING | Facility: CLINIC | Age: 82
End: 2017-08-31
Payer: COMMERCIAL

## 2017-08-31 DIAGNOSIS — Z79.01 LONG-TERM (CURRENT) USE OF ANTICOAGULANTS: ICD-10-CM

## 2017-08-31 LAB — INR POINT OF CARE: 2.2 (ref 0.86–1.14)

## 2017-08-31 PROCEDURE — 99207 ZZC NO CHARGE NURSE ONLY: CPT

## 2017-08-31 PROCEDURE — 36416 COLLJ CAPILLARY BLOOD SPEC: CPT

## 2017-08-31 PROCEDURE — 85610 PROTHROMBIN TIME: CPT | Mod: QW

## 2017-08-31 NOTE — PROGRESS NOTES
ANTICOAGULATION FOLLOW-UP CLINIC VISIT    Patient Name:  Amira Arreola  Date:  8/31/2017  Contact Type:  Face to Face    SUBJECTIVE:     Patient Findings     Positives No Problem Findings    Comments Diarrhea resolved.             OBJECTIVE    INR Protime   Date Value Ref Range Status   08/31/2017 2.2 (A) 0.86 - 1.14 Final       ASSESSMENT / PLAN  INR assessment THER    Recheck INR In: 1 WEEK    INR Location Clinic      Anticoagulation Summary as of 8/31/2017     INR goal 2.0-3.0   Today's INR 2.2   Maintenance plan 6 mg (4 mg x 1.5) on Mon, Wed, Fri; 4 mg (4 mg x 1) all other days   Full instructions 9/1: 4 mg; 9/4: 4 mg; 9/6: 4 mg; Otherwise 6 mg on Mon, Wed, Fri; 4 mg all other days   Weekly total 34 mg   Plan last modified Dayana Barrera RN (3/10/2017)   Next INR check 9/7/2017   Target end date Indefinite    Indications   Long-term (current) use of anticoagulants [Z79.01] [Z79.01]  Atrial fibrillation (H) [I48.91] (Resolved) [I48.91]         Anticoagulation Episode Summary     INR check location     Preferred lab     Send INR reminders to [facility]    Comments       Anticoagulation Care Providers     Provider Role Specialty Phone number    Addy Frias MD [facility] Internal Medicine [phone]            See the Encounter Report to view Anticoagulation Flowsheet and Dosing Calendar (Go to Encounters tab in chart review, and find the Anticoagulation Therapy Visit)    Dosage adjustment made based on physician directed care plan.    INR 2.2 today with 26 mg in the past 7 days.  Diarrhea resolved and slowly returned to normal diet. Take 4 mg daily  = 28 mg weekly.  Recheck in 1 week.     Dayana Barrera RN

## 2017-08-31 NOTE — MR AVS SNAPSHOT
Amira Arreola   8/31/2017 9:00 AM   Anticoagulation Therapy Visit    Description:  85 year old female   Provider:   ANTICOAGULATION CLINIC   Department:  Ec Nurse           INR as of 8/31/2017     Today's INR 2.2      Anticoagulation Summary as of 8/31/2017     INR goal 2.0-3.0   Today's INR 2.2   Full instructions 9/1: 4 mg; 9/4: 4 mg; 9/6: 4 mg; Otherwise 6 mg on Mon, Wed, Fri; 4 mg all other days   Next INR check 9/7/2017    Indications   Long-term (current) use of anticoagulants [Z79.01] [Z79.01]  Atrial fibrillation (H) [I48.91] (Resolved) [I48.91]         Description     INR 2.2 today. Take 4 mg daily  = 28 mg weekly.  Recheck in 1 week.         Your next Anticoagulation Clinic appointment(s)     Sep 07, 2017  9:45 AM CDT   Anticoagulation Visit with  ANTICOAGULATION CLINIC   [facility] ([address] [address] [phone]              Contact Numbers     Clinic Number:         August 2017 Details    Sun Mon Tue Wed Thu Fri Sat       1               2               3               4               5                 6               7               8               9               10               11               12                 13               14               15               16               17               18               19                 20               21               22               23               24               25               26                 27               28               29               30               31      4 mg   See details         Date Details   08/31 This INR check               How to take your warfarin dose     To take:  4 mg Take 1 of the 4 mg tablets.           September 2017 Details    Sun Mon Tue Wed Thu Fri Sat          1      4 mg         2      4 mg           3      4 mg         4      4 mg         5      4 mg         6      4 mg         7            8               9                  10               11               12               13               14               15               16                 17               18               19               20               21               22               23                 24               25               26               27               28               29               30                Date Details   No additional details    Date of next INR:  9/7/2017         How to take your warfarin dose     To take:  4 mg Take 1 of the 4 mg tablets.

## 2017-09-06 ENCOUNTER — HOSPITAL ENCOUNTER (OUTPATIENT)
Facility: CLINIC | Age: 82
Setting detail: SPECIMEN
Discharge: HOME | End: 2017-09-06
Attending: INTERNAL MEDICINE | Admitting: INTERNAL MEDICINE
Payer: MEDICARE

## 2017-09-06 ENCOUNTER — INFUSION THERAPY VISIT (OUTPATIENT)
Dept: INFUSION THERAPY | Facility: CLINIC | Age: 82
End: 2017-09-06
Attending: INTERNAL MEDICINE
Payer: MEDICARE

## 2017-09-06 VITALS
DIASTOLIC BLOOD PRESSURE: 69 MMHG | RESPIRATION RATE: 16 BRPM | SYSTOLIC BLOOD PRESSURE: 127 MMHG | HEART RATE: 75 BPM | BODY MASS INDEX: 22.91 KG/M2 | HEIGHT: 64 IN | WEIGHT: 134.2 LBS | TEMPERATURE: 97.9 F | OXYGEN SATURATION: 97 %

## 2017-09-06 DIAGNOSIS — C90.00 MULTIPLE MYELOMA NOT HAVING ACHIEVED REMISSION (H): Primary | ICD-10-CM

## 2017-09-06 LAB
BASOPHILS # BLD AUTO: 0 10E9/L (ref 0–0.2)
BASOPHILS NFR BLD AUTO: 0.1 %
DIFFERENTIAL METHOD BLD: ABNORMAL
EOSINOPHIL # BLD AUTO: 0 10E9/L (ref 0–0.7)
EOSINOPHIL NFR BLD AUTO: 0 %
ERYTHROCYTE [DISTWIDTH] IN BLOOD BY AUTOMATED COUNT: 14.4 % (ref 10–15)
HCT VFR BLD AUTO: 36.6 % (ref 35–47)
HGB BLD-MCNC: 12.5 G/DL (ref 11.7–15.7)
IMM GRANULOCYTES # BLD: 0 10E9/L (ref 0–0.4)
IMM GRANULOCYTES NFR BLD: 0.3 %
LYMPHOCYTES # BLD AUTO: 0.5 10E9/L (ref 0.8–5.3)
LYMPHOCYTES NFR BLD AUTO: 7.4 %
MCH RBC QN AUTO: 34.2 PG (ref 26.5–33)
MCHC RBC AUTO-ENTMCNC: 34.2 G/DL (ref 31.5–36.5)
MCV RBC AUTO: 100 FL (ref 78–100)
MONOCYTES # BLD AUTO: 0.2 10E9/L (ref 0–1.3)
MONOCYTES NFR BLD AUTO: 2.2 %
NEUTROPHILS # BLD AUTO: 6.4 10E9/L (ref 1.6–8.3)
NEUTROPHILS NFR BLD AUTO: 90 %
NRBC # BLD AUTO: 0 10*3/UL
NRBC BLD AUTO-RTO: 0 /100
PLATELET # BLD AUTO: 137 10E9/L (ref 150–450)
RBC # BLD AUTO: 3.66 10E12/L (ref 3.8–5.2)
WBC # BLD AUTO: 7.1 10E9/L (ref 4–11)

## 2017-09-06 PROCEDURE — 25000128 H RX IP 250 OP 636: Performed by: INTERNAL MEDICINE

## 2017-09-06 PROCEDURE — 96415 CHEMO IV INFUSION ADDL HR: CPT

## 2017-09-06 PROCEDURE — 96401 CHEMO ANTI-NEOPL SQ/IM: CPT

## 2017-09-06 PROCEDURE — 85025 COMPLETE CBC W/AUTO DIFF WBC: CPT | Performed by: INTERNAL MEDICINE

## 2017-09-06 PROCEDURE — 96367 TX/PROPH/DG ADDL SEQ IV INF: CPT

## 2017-09-06 PROCEDURE — 25000132 ZZH RX MED GY IP 250 OP 250 PS 637: Mod: GY | Performed by: INTERNAL MEDICINE

## 2017-09-06 PROCEDURE — 96413 CHEMO IV INFUSION 1 HR: CPT

## 2017-09-06 PROCEDURE — A9270 NON-COVERED ITEM OR SERVICE: HCPCS | Mod: GY | Performed by: INTERNAL MEDICINE

## 2017-09-06 RX ORDER — HEPARIN SODIUM (PORCINE) LOCK FLUSH IV SOLN 100 UNIT/ML 100 UNIT/ML
5 SOLUTION INTRAVENOUS EVERY 8 HOURS
Status: DISCONTINUED | OUTPATIENT
Start: 2017-09-06 | End: 2017-09-06 | Stop reason: HOSPADM

## 2017-09-06 RX ORDER — METOPROLOL SUCCINATE 50 MG/1
100 TABLET, EXTENDED RELEASE ORAL
COMMUNITY
End: 2017-09-21

## 2017-09-06 RX ORDER — ACETAMINOPHEN 325 MG/1
650 TABLET ORAL ONCE
Status: COMPLETED | OUTPATIENT
Start: 2017-09-06 | End: 2017-09-06

## 2017-09-06 RX ORDER — METOPROLOL SUCCINATE 50 MG/1
50 TABLET, EXTENDED RELEASE ORAL AT BEDTIME
COMMUNITY
End: 2017-09-09

## 2017-09-06 RX ADMIN — DIPHENHYDRAMINE HYDROCHLORIDE 50 MG: 50 INJECTION, SOLUTION INTRAMUSCULAR; INTRAVENOUS at 10:34

## 2017-09-06 RX ADMIN — SODIUM CHLORIDE 250 ML: 9 INJECTION, SOLUTION INTRAVENOUS at 10:16

## 2017-09-06 RX ADMIN — BORTEZOMIB 2.2 MG: 3.5 INJECTION, POWDER, LYOPHILIZED, FOR SOLUTION INTRAVENOUS; SUBCUTANEOUS at 10:58

## 2017-09-06 RX ADMIN — DARATUMUMAB 1000 MG: 100 INJECTION, SOLUTION, CONCENTRATE INTRAVENOUS at 10:59

## 2017-09-06 RX ADMIN — ACETAMINOPHEN 650 MG: 325 TABLET ORAL at 10:16

## 2017-09-06 RX ADMIN — SODIUM CHLORIDE, PRESERVATIVE FREE 5 ML: 5 INJECTION INTRAVENOUS at 14:05

## 2017-09-06 RX ADMIN — DEXAMETHASONE SODIUM PHOSPHATE 12 MG: 10 INJECTION, SOLUTION INTRAMUSCULAR; INTRAVENOUS at 10:17

## 2017-09-06 ASSESSMENT — PAIN SCALES - GENERAL: PAINLEVEL: NO PAIN (0)

## 2017-09-06 NOTE — MR AVS SNAPSHOT
After Visit Summary   9/6/2017    Amira Arreola    MRN: [id number]           Patient Information     Date Of Birth          7/17/1932        Visit Information        Provider Department      9/6/2017 9:00 AM  INFUSION CHAIR 18 [facility]        Today's Diagnoses     Multiple myeloma not having achieved remission (H)    -  1       Follow-ups after your visit        Your next 10 appointments already scheduled     Sep 07, 2017  9:45 AM CDT   Anticoagulation Visit with EC ANTICOAGULATION CLINIC   [facility] ([address] [address]   [phone]            Sep 13, 2017  9:00 AM CDT   Level 2 with  INFUSION CHAIR 7   [facility] ([facility])    [facility] [place] [id number]  [facility] [address]   [phone]            Sep 13, 2017  9:30 AM CDT   Return Visit with Shayne Roberts MD   [facility] ([facility])    [facility]  [address]  [place] [address] [phone]            Sep 20, 2017  9:00 AM CDT   Level 4 with  INFUSION CHAIR 14   [facility] ([facility])    [facility]  [address]  [place] [address] [phone]            Sep 21, 2017 10:45 AM CDT   Return Visit with Ramos Rivera MD   [facility] [place] ([facility])    [address] [place] [address] [address]  [facility] [address] [phone]              Who to contact     If you have questions or need follow up information about today's clinic visit or your schedule please contact [facility] directly at [phone].  Normal or non-critical lab and imaging results will be communicated to you by MyChart, letter or phone within  "4 business days after the clinic has received the results. If you do not hear from us within 7 days, please contact the clinic through Hopper or phone. If you have a critical or abnormal lab result, we will notify you by phone as soon as possible.  Submit refill requests through Hopper or call your pharmacy and they will forward the refill request to us. Please allow 3 business days for your refill to be completed.          Additional Information About Your Visit        Hopper Information     Hopper lets you send messages to your doctor, view your test results, renew your prescriptions, schedule appointments and more. To sign up, go to [email or web address] . Click on \"Log in\" on the left side of the screen, which will take you to the Welcome page. Then click on \"Sign up Now\" on the right side of the page.     You will be asked to enter the access code listed below, as well as some personal information. Please follow the directions to create your username and password.     Your access code is: [email or web address]  Expires: 2017  2:33 PM     Your access code will  in 90 days. If you need help or a new code, please call your [facility] clinic or [phone].        Care EveryWhere ID     This is your Care EveryWhere ID. This could be used by other organizations to access your [facility] medical records  [id number]        Your Vitals Were     Pulse Temperature Respirations Height Pulse Oximetry BMI (Body Mass Index)    71 96.6  F (35.9  C) (Oral) 16 1.626 m (5' 4.02\") 97% 23.02 kg/m2       Blood Pressure from Last 3 Encounters:   17 135/71   17 126/77   17 (P) 126/74    Weight from Last 3 Encounters:   17 60.9 kg (134 lb 3.2 oz)   17 60.1 kg (132 lb 6.4 oz)   17 60.1 kg (132 lb 6.4 oz)              We Performed the Following     CBC with platelets differential          Today's Medication Changes          These changes are accurate as of: 17  2:00 PM.  If you have any " questions, ask your nurse or doctor.               These medicines have changed or have updated prescriptions.        Dose/Directions    * TOPROL XL 50 MG 24 hr tablet   This may have changed:  Another medication with the same name was removed. Continue taking this medication, and follow the directions you see here.   Generic drug:  metoprolol        Dose:  50 mg   Take 50 mg by mouth At Bedtime   Refills:  0       * TOPROL XL 50 MG 24 hr tablet   This may have changed:  Another medication with the same name was removed. Continue taking this medication, and follow the directions you see here.   Generic drug:  metoprolol        Dose:  100 mg   Take 100 mg by mouth daily (with breakfast)   Refills:  0       * Notice:  This list has 2 medication(s) that are the same as other medications prescribed for you. Read the directions carefully, and ask your doctor or other care provider to review them with you.             Primary Care Provider Office Phone # Fax #    Addy Frias [phone] [address] [facility] [address]  [facility] [address]        Equal Access to Services     [facility] : Hadii liane ku hadasho Soomaali, waaxda luqadaha, qaybta kaalmada adeegyada, waxay genoin osiel dominique . So [facility] [phone].    ATENCIÓN: Si habla español, tiene a barlow disposición servicios gratuitos de asistencia lingüística. [facility] [phone].    We comply with applicable federal civil rights laws and [place] laws. We do not discriminate on the basis of race, color, national origin, age, disability sex, sexual orientation or gender identity.            Thank you!     Thank you for choosing [facility]  for your care. Our goal is always to provide you with excellent care. Hearing back from our patients is one way we can continue to improve our services. Please take a few minutes to complete the written survey that you may receive in the mail after your visit with  us. Thank you!             Your Updated Medication List - Protect others around you: Learn how to safely use, store and throw away your medicines at www.disposemymeds.org.          This list is accurate as of: 9/6/17  2:00 PM.  Always use your most recent med list.                   Brand Name Dispense Instructions for use Diagnosis    acyclovir 400 MG tablet    ZOVIRAX    60 tablet    Take 1 tablet (400 mg) by mouth 2 times daily Viral Prophylaxis.    Multiple myeloma not having achieved remission (H)       ASPIRIN NOT PRESCRIBED    INTENTIONAL    0 each    Antiplatelet medication not prescribed intentionally due to Current anticoagulant therapy (warfarin/enoxaparin)    Paroxysmal atrial fibrillation (H)       CALCIUM CITRATE + PO      Take 2,000 mg by mouth daily 2 tabs        carboxymethylcellulose 0.5 % Soln ophthalmic solution    REFRESH PLUS     1 drop 4 times daily        CLARINEX PO      Take by mouth daily Taking claritin        COMPAZINE PO      Take 10 mg by mouth daily as needed        cycloSPORINE 0.05 % ophthalmic emulsion    RESTASIS     Place 1 drop into both eyes every 12 hours        DAILY MULTIVITAMIN PO      Take 1 tablet by mouth daily.    Routine general medical examination at a health care facility       dexamethasone 4 MG tablet    DECADRON    28 tablet    Take 20mg (5 tablets) PO every week on the morning of velcade injection. Then take 1 tablet (4mg) by mouth for two days after darzalex.    Multiple myeloma not having achieved remission (H)       erythromycin ophthalmic ointment    ROMYCIN     Place 1 Application into both eyes At Bedtime        GENTLE STOOL SOFTENER PO      Take 100 mg by mouth daily        lidocaine-prilocaine cream    EMLA    30 g    Apply topically as needed for moderate pain Apply dollop size amount to port site 30-60 min prior to accessing    Multiple myeloma not having achieved remission (H)       LORazepam 0.5 MG tablet    ATIVAN    20 tablet    Take 1 tablet (0.5  mg) by mouth every 8 hours as needed for anxiety    Multiple myeloma not having achieved remission (H)       oxyCODONE 15 MG IR tablet    ROXICODONE    90 tablet    Take 1 tablet (15 mg) by mouth every 8 hours as needed for pain maximum 4 tablet(s) per day    Multiple myeloma not having achieved remission (H)       polyethylene glycol powder    MIRALAX/GLYCOLAX     Take 1 capful by mouth daily as needed    Bilateral leg edema       timolol 0.25 % ophthalmic solution    TIMOPTIC     Place 1 drop into the right eye 2 times daily        * TOPROL XL 50 MG 24 hr tablet   Generic drug:  metoprolol      Take 50 mg by mouth At Bedtime        * TOPROL XL 50 MG 24 hr tablet   Generic drug:  metoprolol      Take 100 mg by mouth daily (with breakfast)        TYLENOL PO      Take 500 mg by mouth every 6 hours as needed for mild pain or fever        UNABLE TO FIND      MEDICATION NAME: Fresh Coat eye drops        VITAMIN D3 PO      Take 1,000 Units by mouth daily        warfarin 4 MG tablet    COUMADIN    110 tablet    TAKE ONE AND ONE-HALF TABLETS BY MOUTH ON MONDAY, WEDNESDAY, AND FRIDAY AND ONE TABLET THE OTHER DAYS OF THE WEEK    Paroxysmal atrial fibrillation (H), Long-term (current) use of anticoagulants       ZOMETA IV      Inject into the vein every 30 days Every 3 month dosing        * Notice:  This list has 2 medication(s) that are the same as other medications prescribed for you. Read the directions carefully, and ask your doctor or other care provider to review them with you.

## 2017-09-06 NOTE — PROGRESS NOTES
Infusion Nursing Note:  Amira Arreola presents today for D15,C4 Velcade,Darzalex.    Patient seen by provider today: No   present during visit today: Not Applicable.    Note: No concerns or changes to report.Just clarification needed as she took the required Decadron today 20 mg but states she has not been taking the 4 mg dose two days following Darzalex. I contacted pharmacist and they instructed pt. She should take the 4 mg dose on two days after Darzalex infusions.    Intravenous Access:  Labs drawn without difficulty.  Implanted Port.    Treatment Conditions:  Lab Results   Component Value Date    HGB 12.5 09/06/2017     Lab Results   Component Value Date    WBC 7.1 09/06/2017      Lab Results   Component Value Date    ANEU 6.4 09/06/2017     Lab Results   Component Value Date     09/06/2017      Lab Results   Component Value Date     06/13/2017                   Lab Results   Component Value Date    POTASSIUM 4.0 06/13/2017           No results found for: MAG         Lab Results   Component Value Date    CR 0.65 08/23/2017                   Lab Results   Component Value Date    BRADLEY 9.5 08/23/2017                Lab Results   Component Value Date    BILITOTAL 0.5 08/23/2017           Lab Results   Component Value Date    ALBUMIN 3.3 08/23/2017                    Lab Results   Component Value Date    ALT 23 08/23/2017           Lab Results   Component Value Date    AST 20 08/23/2017     Results reviewed, labs MET treatment parameters, ok to proceed with treatment.          Post Infusion Assessment:  Patient tolerated infusion without incident.  Blood return noted pre and post infusion.  Site patent and intact, free from redness, edema or discomfort.  No evidence of extravasations.  Access discontinued per protocol.    Discharge Plan:   Discharge instructions reviewed with: Patient.  Patient and/or family verbalized understanding of discharge instructions and all questions  answered.  Copy of AVS reviewed with patient and/or family.  Patient will return 9/13 for next appointment.  Patient discharged in stable condition accompanied by: .  Departure Mode: Ambulatory.    Rosangela Reese RN

## 2017-09-07 ENCOUNTER — ANTICOAGULATION THERAPY VISIT (OUTPATIENT)
Dept: NURSING | Facility: CLINIC | Age: 82
End: 2017-09-07
Payer: COMMERCIAL

## 2017-09-07 DIAGNOSIS — Z79.01 LONG-TERM (CURRENT) USE OF ANTICOAGULANTS: ICD-10-CM

## 2017-09-07 LAB — INR POINT OF CARE: 2.4 (ref 0.86–1.14)

## 2017-09-07 PROCEDURE — 36416 COLLJ CAPILLARY BLOOD SPEC: CPT

## 2017-09-07 PROCEDURE — 85610 PROTHROMBIN TIME: CPT | Mod: QW

## 2017-09-07 PROCEDURE — 99207 ZZC NO CHARGE NURSE ONLY: CPT

## 2017-09-07 NOTE — PROGRESS NOTES
ANTICOAGULATION FOLLOW-UP CLINIC VISIT    Patient Name:  Amira Arreola  Date:  9/7/2017  Contact Type:  Face to Face    SUBJECTIVE:     Patient Findings     Positives No Problem Findings    Comments Diarrhea resolved for almost 1 week            OBJECTIVE    INR Protime   Date Value Ref Range Status   09/07/2017 2.4 (A) 0.86 - 1.14 Final       ASSESSMENT / PLAN  INR assessment THER    Recheck INR In: 1 WEEK    INR Location Clinic      Anticoagulation Summary as of 9/7/2017     INR goal 2.0-3.0   Today's INR 2.4   Maintenance plan 4 mg (4 mg x 1) every day   Full instructions 4 mg every day   Weekly total 28 mg   Plan last modified Dayana Barrera RN (9/7/2017)   Next INR check 9/14/2017   Target end date Indefinite    Indications   Long-term (current) use of anticoagulants [Z79.01] [Z79.01]  Atrial fibrillation (H) [I48.91] (Resolved) [I48.91]         Anticoagulation Episode Summary     INR check location     Preferred lab     Send INR reminders to  [facility]    Comments       Anticoagulation Care Providers     Provider Role Specialty Phone number    Addy Frias MD [facility] Internal Medicine [phone]            See the Encounter Report to view Anticoagulation Flowsheet and Dosing Calendar (Go to Encounters tab in chart review, and find the Anticoagulation Therapy Visit)    Dosage adjustment made based on physician directed care plan.    INR of 2.4 with 28 mg last week.  Diarrhea resolved for almost 1 week. Will continue with 4 mg daily = 28 mg for another week.  Recheck in 1 week    Dayana Barrera RN

## 2017-09-07 NOTE — MR AVS SNAPSHOT
Amira Arreola   9/7/2017 9:45 AM   Anticoagulation Therapy Visit    Description:  85 year old female   Provider:   ANTICOAGULATION CLINIC   Department:  Ec Nurse           INR as of 9/7/2017     Today's INR 2.4      Anticoagulation Summary as of 9/7/2017     INR goal 2.0-3.0   Today's INR 2.4   Full instructions 4 mg every day   Next INR check 9/14/2017    Indications   Long-term (current) use of anticoagulants [Z79.01] [Z79.01]  Atrial fibrillation (H) [I48.91] (Resolved) [I48.91]         Description     INR of 2.4 with 28 mg last week.  Diarrhea resolved for almost 1 week. Will continue with 4 mg daily = 28 mg for another week.  Recheck in 1 week      Your next Anticoagulation Clinic appointment(s)     Sep 14, 2017 10:00 AM CDT   Anticoagulation Visit with  ANTICOAGULATION CLINIC   [facility]    [address] [place] [id number] [phone]              Contact Numbers     Clinic Number:         September 2017 Details    Sun Mon Tue Wed Thu Fri Sat          1               2                 3               4               5               6               7      4 mg   See details      8      4 mg         9      4 mg           10      4 mg         11      4 mg         12      4 mg         13      4 mg         14            15               16                 17               18               19               20               21               22               23                 24               25               26               27               28               29               30                Date Details   09/07 This INR check       Date of next INR:  9/14/2017         How to take your warfarin dose     To take:  4 mg Take 1 of the 4 mg tablets.

## 2017-09-09 DIAGNOSIS — I48.0 PAROXYSMAL ATRIAL FIBRILLATION (H): Primary | ICD-10-CM

## 2017-09-09 NOTE — TELEPHONE ENCOUNTER
"Note from pharmacy: \"Pt states her dose has now increased to 100mg QAM and 50mg QPM. Please cverify dose change and sent new Rx if appropriate. Thank you\"    Pended with new SIG    Pending Prescriptions:                       Disp   Refills    metoprolol (TOPROL XL) 50 MG 24 hr tablet 270 ta*1            Sig: Take 2 tablets (100mg) in the morning and 1           tablet (50mg) in the evening by mouth daily          Last Written Prescription Date:  12/22/16 Discontinued 9/6/17  Last Fill Quantity: 180,   # refills: 3  Last Office Visit with [facility] or [facility] prescribing provider: 12/22/17 [facility]  Future Office visit:    Next 5 appointments (look out 90 days)     Sep 13, 2017  9:30 AM CDT   Return Visit with Shayne Roberts MD   [facility] ([facility])    [facility]  [address]  [facility] [address]   [phone]            Sep 21, 2017 10:45 AM CDT   Return Visit with Ramos Rivera MD   [facility] [place] ([facility])    [address]  [facility] [address]   [phone]                   Routing refill request to provider for review/approval because:  Drug not active on patient's medication list    Mayte Jacobs, RT(R)    "

## 2017-09-10 RX ORDER — METOPROLOL SUCCINATE 50 MG/1
TABLET, EXTENDED RELEASE ORAL
Qty: 270 TABLET | Refills: 1 | Status: SHIPPED | OUTPATIENT
Start: 2017-09-10 | End: 2018-03-13

## 2017-09-13 ENCOUNTER — HOSPITAL ENCOUNTER (OUTPATIENT)
Facility: CLINIC | Age: 82
Setting detail: SPECIMEN
Discharge: HOME | End: 2017-09-13
Attending: INTERNAL MEDICINE | Admitting: INTERNAL MEDICINE
Payer: MEDICARE

## 2017-09-13 ENCOUNTER — ONCOLOGY VISIT (OUTPATIENT)
Dept: ONCOLOGY | Facility: CLINIC | Age: 82
End: 2017-09-13
Attending: INTERNAL MEDICINE
Payer: MEDICARE

## 2017-09-13 ENCOUNTER — INFUSION THERAPY VISIT (OUTPATIENT)
Dept: INFUSION THERAPY | Facility: CLINIC | Age: 82
End: 2017-09-13
Attending: INTERNAL MEDICINE
Payer: MEDICARE

## 2017-09-13 VITALS
DIASTOLIC BLOOD PRESSURE: 79 MMHG | RESPIRATION RATE: 16 BRPM | WEIGHT: 134.2 LBS | BODY MASS INDEX: 22.91 KG/M2 | TEMPERATURE: 97.8 F | HEIGHT: 64 IN | SYSTOLIC BLOOD PRESSURE: 126 MMHG | HEART RATE: 81 BPM

## 2017-09-13 VITALS
DIASTOLIC BLOOD PRESSURE: 79 MMHG | HEIGHT: 64 IN | WEIGHT: 134.2 LBS | RESPIRATION RATE: 16 BRPM | HEART RATE: 81 BPM | BODY MASS INDEX: 22.91 KG/M2 | TEMPERATURE: 97.8 F | SYSTOLIC BLOOD PRESSURE: 126 MMHG

## 2017-09-13 DIAGNOSIS — Z95.828 PORTACATH IN PLACE: ICD-10-CM

## 2017-09-13 DIAGNOSIS — C90.00 MULTIPLE MYELOMA NOT HAVING ACHIEVED REMISSION (H): Primary | ICD-10-CM

## 2017-09-13 LAB
BASOPHILS # BLD AUTO: 0 10E9/L (ref 0–0.2)
BASOPHILS NFR BLD AUTO: 0 %
DIFFERENTIAL METHOD BLD: ABNORMAL
EOSINOPHIL # BLD AUTO: 0 10E9/L (ref 0–0.7)
EOSINOPHIL NFR BLD AUTO: 0.2 %
ERYTHROCYTE [DISTWIDTH] IN BLOOD BY AUTOMATED COUNT: 14.4 % (ref 10–15)
HCT VFR BLD AUTO: 36.1 % (ref 35–47)
HGB BLD-MCNC: 12.4 G/DL (ref 11.7–15.7)
IMM GRANULOCYTES # BLD: 0 10E9/L (ref 0–0.4)
IMM GRANULOCYTES NFR BLD: 0.2 %
LYMPHOCYTES # BLD AUTO: 0.4 10E9/L (ref 0.8–5.3)
LYMPHOCYTES NFR BLD AUTO: 6.6 %
MCH RBC QN AUTO: 34.3 PG (ref 26.5–33)
MCHC RBC AUTO-ENTMCNC: 34.3 G/DL (ref 31.5–36.5)
MCV RBC AUTO: 100 FL (ref 78–100)
MONOCYTES # BLD AUTO: 0.2 10E9/L (ref 0–1.3)
MONOCYTES NFR BLD AUTO: 2.6 %
NEUTROPHILS # BLD AUTO: 6 10E9/L (ref 1.6–8.3)
NEUTROPHILS NFR BLD AUTO: 90.4 %
NRBC # BLD AUTO: 0 10*3/UL
NRBC BLD AUTO-RTO: 0 /100
PLATELET # BLD AUTO: 141 10E9/L (ref 150–450)
RBC # BLD AUTO: 3.62 10E12/L (ref 3.8–5.2)
WBC # BLD AUTO: 6.6 10E9/L (ref 4–11)

## 2017-09-13 PROCEDURE — 99214 OFFICE O/P EST MOD 30 MIN: CPT | Performed by: INTERNAL MEDICINE

## 2017-09-13 PROCEDURE — 99211 OFF/OP EST MAY X REQ PHY/QHP: CPT | Mod: 25

## 2017-09-13 PROCEDURE — 96401 CHEMO ANTI-NEOPL SQ/IM: CPT

## 2017-09-13 PROCEDURE — 25000128 H RX IP 250 OP 636: Performed by: INTERNAL MEDICINE

## 2017-09-13 PROCEDURE — 85025 COMPLETE CBC W/AUTO DIFF WBC: CPT | Performed by: INTERNAL MEDICINE

## 2017-09-13 RX ORDER — OXYCODONE HYDROCHLORIDE 15 MG/1
15 TABLET ORAL EVERY 8 HOURS PRN
Qty: 90 TABLET | Refills: 0 | Status: SHIPPED | OUTPATIENT
Start: 2017-09-13 | End: 2017-10-11

## 2017-09-13 RX ORDER — HEPARIN SODIUM (PORCINE) LOCK FLUSH IV SOLN 100 UNIT/ML 100 UNIT/ML
500 SOLUTION INTRAVENOUS EVERY 8 HOURS
Status: DISCONTINUED | OUTPATIENT
Start: 2017-09-13 | End: 2017-09-13 | Stop reason: HOSPADM

## 2017-09-13 RX ORDER — HEPARIN SODIUM (PORCINE) LOCK FLUSH IV SOLN 100 UNIT/ML 100 UNIT/ML
500 SOLUTION INTRAVENOUS EVERY 8 HOURS
Status: CANCELLED
Start: 2017-09-13

## 2017-09-13 RX ORDER — LORAZEPAM 0.5 MG/1
0.5 TABLET ORAL EVERY 8 HOURS PRN
Qty: 30 TABLET | Refills: 3 | Status: SHIPPED | OUTPATIENT
Start: 2017-09-13 | End: 2017-11-08

## 2017-09-13 RX ADMIN — BORTEZOMIB 2.2 MG: 3.5 INJECTION, POWDER, LYOPHILIZED, FOR SOLUTION INTRAVENOUS; SUBCUTANEOUS at 11:25

## 2017-09-13 RX ADMIN — SODIUM CHLORIDE, PRESERVATIVE FREE 500 UNITS: 5 INJECTION INTRAVENOUS at 11:33

## 2017-09-13 ASSESSMENT — PAIN SCALES - GENERAL: PAINLEVEL: NO PAIN (0)

## 2017-09-13 NOTE — MR AVS SNAPSHOT
After Visit Summary   9/13/2017    Amira Arreola    MRN: [id number]           Patient Information     Date Of Birth          7/17/1932        Visit Information        Provider Department      9/13/2017 9:00 AM  INFUSION CHAIR 7 [facility]        Today's Diagnoses     Multiple myeloma not having achieved remission (H)    -  1    Portacath in place           Follow-ups after your visit        Your next 10 appointments already scheduled     Sep 14, 2017 10:00 AM CDT   Anticoagulation Visit with EC ANTICOAGULATION CLINIC   [facility] ([address] [address]   [phone]            Sep 20, 2017  9:00 AM CDT   Level 4 with  INFUSION CHAIR 14   [facility] ([facility])    [facility]  [address]  [place] [address]   [phone]            Sep 21, 2017 10:45 AM CDT   Return Visit with Ramos Rivera MD   [facility] PHYSICIANS [facility] AT [place] ([facility])    [address] [place] [address] [address]  [place] [address]   [phone]            Sep 27, 2017  9:30 AM CDT   Level 2 with  INFUSION CHAIR 14   [facility] ([facility])    [facility]  [address]  [place] [address]   [phone]            Oct 04, 2017  9:00 AM CDT   Level 4 with  INFUSION CHAIR 12   [facility] ([facility])    [facility]  [address]  [place] [address]   [phone]            Oct 11, 2017 10:00 AM CDT   Level 2 with SH INFUSION CHAIR 10   [facility] ([facility])    [facility]  [address]  [place] [address]   [phone]            Oct 11, 2017 10:30 AM  "CDT   Return Visit with Shayne Roberts MD   [facility] ([facility])    [facility] [place]  [address]  [place] [address] [phone]              Who to contact     If you have questions or need follow up information about today's clinic visit or your schedule please contact [facility] directly at [phone].  Normal or non-critical lab and imaging results will be communicated to you by A4 Datahart, letter or phone within 4 business days after the clinic has received the results. If you do not hear from us within 7 days, please contact the clinic through A[employer] Datahart or phone. If you have a critical or abnormal lab result, we will notify you by phone as soon as possible.  Submit refill requests through Isoflux or call your pharmacy and they will forward the refill request to us. Please allow 3 business days for your refill to be completed.          Additional Information About Your Visit        Isoflux Information     I[employer]flux lets you send messages to your doctor, view your test results, renew your prescriptions, schedule appointments and more. To sign up, go to [email or web address] . Click on \"Log in\" on the left side of the screen, which will take you to the Welcome page. Then click on \"Sign up Now\" on the right side of the page.     You will be asked to enter the access code listed below, as well as some personal information. Please follow the directions to create your username and password.     Your access code is: [email or web address]  Expires: 2017  2:33 PM     Your access code will  in 90 days. If you need help or a new code, please call your [place] clinic or [phone].        Care EveryWhere ID     This is your Care EveryWhere ID. This could be used by other organizations to access your [place] medical records  [id number]        Your Vitals Were     Pulse Temperature Respirations Height BMI (Body Mass " "Index)       81 97.8  F (36.6  C) (Oral) 16 1.626 m (5' 4.02\") 23.02 kg/m2        Blood Pressure from Last 3 Encounters:   09/13/17 126/79   09/13/17 126/79   09/06/17 127/69    Weight from Last 3 Encounters:   09/13/17 60.9 kg (134 lb 3.2 oz)   09/13/17 60.9 kg (134 lb 3.2 oz)   09/06/17 60.9 kg (134 lb 3.2 oz)              We Performed the Following     CBC with platelets differential          Where to get your medicines      Some of these will need a paper prescription and others can be bought over the counter.  Ask your nurse if you have questions.     Bring a paper prescription for each of these medications     LORazepam 0.5 MG tablet    oxyCODONE 15 MG IR tablet          Primary Care Provider Office Phone # Fax #    Addy Sean Frias [phone] [address] [address]        Equal Access to Services     [facility]: Hadii liane ku hadasho Soomaali, waaxda luqadaha, qaybta kaalmada adesergioyakira, hung dominique . So [facility] [phone].    ATENCIÓN: Si habla español, tiene a barlow disposición servicios gratuitos de asistencia lingüística. [facility] [phone].    We comply with applicable federal civil rights laws and [place] laws. We do not discriminate on the basis of race, color, national origin, age, disability sex, sexual orientation or gender identity.            Thank you!     Thank you for choosing [facility]  for your care. Our goal is always to provide you with excellent care. Hearing back from our patients is one way we can continue to improve our services. Please take a few minutes to complete the written survey that you may receive in the mail after your visit with us. Thank you!             Your Updated Medication List - Protect others around you: Learn how to safely use, store and throw away your medicines at www.disposemymeds.org.          This list is accurate as of: 9/13/17 11:32 AM.  Always use " your most recent med list.                   Brand Name Dispense Instructions for use Diagnosis    acyclovir 400 MG tablet    ZOVIRAX    60 tablet    Take 1 tablet (400 mg) by mouth 2 times daily Viral Prophylaxis.    Multiple myeloma not having achieved remission (H)       ASPIRIN NOT PRESCRIBED    INTENTIONAL    0 each    Antiplatelet medication not prescribed intentionally due to Current anticoagulant therapy (warfarin/enoxaparin)    Paroxysmal atrial fibrillation (H)       CALCIUM CITRATE + PO      Take 2,000 mg by mouth daily 2 tabs        carboxymethylcellulose 0.5 % Soln ophthalmic solution    REFRESH PLUS     1 drop 4 times daily        CLARINEX PO      Take by mouth daily Taking claritin        COMPAZINE PO      Take 10 mg by mouth daily as needed        cycloSPORINE 0.05 % ophthalmic emulsion    RESTASIS     Place 1 drop into both eyes every 12 hours        DAILY MULTIVITAMIN PO      Take 1 tablet by mouth daily.    Routine general medical examination at a health care facility       dexamethasone 4 MG tablet    DECADRON    28 tablet    Take 20mg (5 tablets) PO every week on the morning of velcade injection. Then take 1 tablet (4mg) by mouth for two days after darzalex.    Multiple myeloma not having achieved remission (H)       erythromycin ophthalmic ointment    ROMYCIN     Place 1 Application into both eyes At Bedtime        GENTLE STOOL SOFTENER PO      Take 100 mg by mouth daily        lidocaine-prilocaine cream    EMLA    30 g    Apply topically as needed for moderate pain Apply dollop size amount to port site 30-60 min prior to accessing    Multiple myeloma not having achieved remission (H)       LORazepam 0.5 MG tablet    ATIVAN    30 tablet    Take 1 tablet (0.5 mg) by mouth every 8 hours as needed for anxiety    Multiple myeloma not having achieved remission (H)       oxyCODONE 15 MG IR tablet    ROXICODONE    90 tablet    Take 1 tablet (15 mg) by mouth every 8 hours as needed for pain maximum 4  tablet(s) per day    Multiple myeloma not having achieved remission (H)       polyethylene glycol powder    MIRALAX/GLYCOLAX     Take 1 capful by mouth daily as needed    Bilateral leg edema       timolol 0.25 % ophthalmic solution    TIMOPTIC     Place 1 drop into the right eye 2 times daily        * TOPROL XL 50 MG 24 hr tablet   Generic drug:  metoprolol      Take 100 mg by mouth daily (with breakfast)        * metoprolol 50 MG 24 hr tablet    TOPROL XL    270 tablet    Take 2 tablets (100mg) in the morning and 1 tablet (50mg) in the evening by mouth daily    Paroxysmal atrial fibrillation (H)       TYLENOL PO      Take 500 mg by mouth every 6 hours as needed for mild pain or fever        UNABLE TO FIND      MEDICATION NAME: Fresh Coat eye drops        VITAMIN D3 PO      Take 1,000 Units by mouth daily        warfarin 4 MG tablet    COUMADIN    110 tablet    TAKE ONE AND ONE-HALF TABLETS BY MOUTH ON MONDAY, WEDNESDAY, AND FRIDAY AND ONE TABLET THE OTHER DAYS OF THE WEEK    Paroxysmal atrial fibrillation (H), Long-term (current) use of anticoagulants       ZOMETA IV      Inject into the vein every 30 days Every 3 month dosing        * Notice:  This list has 2 medication(s) that are the same as other medications prescribed for you. Read the directions carefully, and ask your doctor or other care provider to review them with you.

## 2017-09-13 NOTE — MR AVS SNAPSHOT
After Visit Summary   9/13/2017    Amira Arreola    MRN: [id number]           Patient Information     Date Of Birth          7/17/1932        Visit Information        Provider Department      9/13/2017 9:30 AM Shayne Roberts MD [facility]        Today's Diagnoses     Multiple myeloma not having achieved remission (H)    -  1      Care Instructions    Continue chemotherapy.  Follow up in 1 month.          Follow-ups after your visit        Your next 10 appointments already scheduled     Sep 14, 2017 10:00 AM CDT   Anticoagulation Visit with EC ANTICOAGULATION CLINIC   [facility] ([facility])    [address] [place] [address]   [phone]            Sep 20, 2017  9:00 AM CDT   Level 4 with  INFUSION CHAIR 14   [facility] ([facility])    [facility]  [address]  [place] [address]   [phone]            Sep 21, 2017 10:45 AM CDT   Return Visit with Ramos Rivera MD   [facility] PHYSICIANS [facility] AT [place] ([facility])    [address] [address]  [facility] [address]   [phone]            Sep 27, 2017  9:30 AM CDT   Level 2 with  INFUSION CHAIR 14   [facility] ([facility])    [facility]  [address]  [place] [address]   [phone]            Oct 04, 2017  9:00 AM CDT   Level 4 with  INFUSION CHAIR 12   [facility] ([facility])    [facility]  [address]  [place] [address]   [phone]            Oct 11, 2017 10:00 AM CDT   Level 2 with  INFUSION CHAIR 10   [facility] ([facility])    [facility]  [address]  [place] [address]   [phone]     "        Oct 11, 2017 10:30 AM CDT   Return Visit with Shayne Roberts MD   [facility] ([facility])    [facility] [place]  [address]  [place] [address] [phone]              Who to contact     If you have questions or need follow up information about today's clinic visit or your schedule please contact [facility] directly at [phone].  Normal or non-critical lab and imaging results will be communicated to you by Novogenhart, letter or phone within 4 business days after the clinic has received the results. If you do not hear from us within 7 days, please contact the clinic through Novogenhart or phone. If you have a critical or abnormal lab result, we will notify you by phone as soon as possible.  Submit refill requests through [employer] or call your pharmacy and they will forward the refill request to us. Please allow 3 business days for your refill to be completed.          Additional Information About Your Visit        [employer] Information     [employer] lets you send messages to your doctor, view your test results, renew your prescriptions, schedule appointments and more. To sign up, go to [email or web address] . Click on \"Log in\" on the left side of the screen, which will take you to the Welcome page. Then click on \"Sign up Now\" on the right side of the page.     You will be asked to enter the access code listed below, as well as some personal information. Please follow the directions to create your username and password.     Your access code is: [email or web address]  Expires: 2017  2:33 PM     Your access code will  in 90 days. If you need help or a new code, please call your [place] clinic or [phone].        Care EveryWhere ID     This is your Care EveryWhere ID. This could be used by other organizations to access your [place] medical records  [id number]        Your Vitals Were     Pulse Temperature Respirations Height BMI (Body " "Mass Index)       81 97.8  F (36.6  C) (Oral) 16 1.626 m (5' 4.02\") 23.02 kg/m2        Blood Pressure from Last 3 Encounters:   09/13/17 126/79   09/13/17 126/79   09/06/17 127/69    Weight from Last 3 Encounters:   09/13/17 60.9 kg (134 lb 3.2 oz)   09/13/17 60.9 kg (134 lb 3.2 oz)   09/06/17 60.9 kg (134 lb 3.2 oz)              Today, you had the following     No orders found for display         Where to get your medicines      Some of these will need a paper prescription and others can be bought over the counter.  Ask your nurse if you have questions.     Bring a paper prescription for each of these medications     LORazepam 0.5 MG tablet    oxyCODONE 15 MG IR tablet          Primary Care Provider Office Phone # Fax #    Addy Sean Frias [phone] [address] [address]        Equal Access to Services     [facility]: Hadii liane murcia hadasho Soyair, waaxda luqadaha, qaybta kaalmada adeeglatia, hung dominique . So [facility] [phone].    ATENCIÓN: Si habla español, tiene a barlow disposición servicios gratuitos de asistencia lingüística. Llame al [phone].    We comply with applicable federal civil rights laws and [place] laws. We do not discriminate on the basis of race, color, national origin, age, disability sex, sexual orientation or gender identity.            Thank you!     Thank you for choosing [facility]  for your care. Our goal is always to provide you with excellent care. Hearing back from our patients is one way we can continue to improve our services. Please take a few minutes to complete the written survey that you may receive in the mail after your visit with us. Thank you!             Your Updated Medication List - Protect others around you: Learn how to safely use, store and throw away your medicines at www.disposemymeds.org.          This list is accurate as of: 9/13/17 10:50 AM.  Always use your most recent " med list.                   Brand Name Dispense Instructions for use Diagnosis    acyclovir 400 MG tablet    ZOVIRAX    60 tablet    Take 1 tablet (400 mg) by mouth 2 times daily Viral Prophylaxis.    Multiple myeloma not having achieved remission (H)       ASPIRIN NOT PRESCRIBED    INTENTIONAL    0 each    Antiplatelet medication not prescribed intentionally due to Current anticoagulant therapy (warfarin/enoxaparin)    Paroxysmal atrial fibrillation (H)       CALCIUM CITRATE + PO      Take 2,000 mg by mouth daily 2 tabs        carboxymethylcellulose 0.5 % Soln ophthalmic solution    REFRESH PLUS     1 drop 4 times daily        CLARINEX PO      Take by mouth daily Taking claritin        COMPAZINE PO      Take 10 mg by mouth daily as needed        cycloSPORINE 0.05 % ophthalmic emulsion    RESTASIS     Place 1 drop into both eyes every 12 hours        DAILY MULTIVITAMIN PO      Take 1 tablet by mouth daily.    Routine general medical examination at a health care facility       dexamethasone 4 MG tablet    DECADRON    28 tablet    Take 20mg (5 tablets) PO every week on the morning of velcade injection. Then take 1 tablet (4mg) by mouth for two days after darzalex.    Multiple myeloma not having achieved remission (H)       erythromycin ophthalmic ointment    ROMYCIN     Place 1 Application into both eyes At Bedtime        GENTLE STOOL SOFTENER PO      Take 100 mg by mouth daily        lidocaine-prilocaine cream    EMLA    30 g    Apply topically as needed for moderate pain Apply dollop size amount to port site 30-60 min prior to accessing    Multiple myeloma not having achieved remission (H)       LORazepam 0.5 MG tablet    ATIVAN    30 tablet    Take 1 tablet (0.5 mg) by mouth every 8 hours as needed for anxiety    Multiple myeloma not having achieved remission (H)       oxyCODONE 15 MG IR tablet    ROXICODONE    90 tablet    Take 1 tablet (15 mg) by mouth every 8 hours as needed for pain maximum 4 tablet(s) per day     Multiple myeloma not having achieved remission (H)       polyethylene glycol powder    MIRALAX/GLYCOLAX     Take 1 capful by mouth daily as needed    Bilateral leg edema       timolol 0.25 % ophthalmic solution    TIMOPTIC     Place 1 drop into the right eye 2 times daily        * TOPROL XL 50 MG 24 hr tablet   Generic drug:  metoprolol      Take 100 mg by mouth daily (with breakfast)        * metoprolol 50 MG 24 hr tablet    TOPROL XL    270 tablet    Take 2 tablets (100mg) in the morning and 1 tablet (50mg) in the evening by mouth daily    Paroxysmal atrial fibrillation (H)       TYLENOL PO      Take 500 mg by mouth every 6 hours as needed for mild pain or fever        UNABLE TO FIND      MEDICATION NAME: Fresh Coat eye drops        VITAMIN D3 PO      Take 1,000 Units by mouth daily        warfarin 4 MG tablet    COUMADIN    110 tablet    TAKE ONE AND ONE-HALF TABLETS BY MOUTH ON MONDAY, WEDNESDAY, AND FRIDAY AND ONE TABLET THE OTHER DAYS OF THE WEEK    Paroxysmal atrial fibrillation (H), Long-term (current) use of anticoagulants       ZOMETA IV      Inject into the vein every 30 days Every 3 month dosing        * Notice:  This list has 2 medication(s) that are the same as other medications prescribed for you. Read the directions carefully, and ask your doctor or other care provider to review them with you.

## 2017-09-13 NOTE — PROGRESS NOTES
"Oncology Rooming Note    September 13, 2017 9:40 AM   Amira Arreola is a 85 year old female who presents for:    Chief Complaint   Patient presents with     Oncology Clinic Visit     Multiple myeloma not having achieved remission      Initial Vitals: /79  Pulse 81  Temp 97.8  F (36.6  C) (Oral)  Resp 16  Ht 1.626 m (5' 4.02\")  Wt 60.9 kg (134 lb 3.2 oz)  BMI 23.02 kg/m2 Estimated body mass index is 23.02 kg/(m^2) as calculated from the following:    Height as of this encounter: 1.626 m (5' 4.02\").    Weight as of this encounter: 60.9 kg (134 lb 3.2 oz). Body surface area is 1.66 meters squared.  Data Unavailable Comment: Data Unavailable   No LMP recorded. Patient is postmenopausal.  Allergies reviewed: Yes  Medications reviewed: Yes    Medications: MEDICATION REFILL NEEDED ON OXYCODONE 15 MG AND ATIVAN  [facility] into [facility] [address] [place] [address] AT [facility] [address]    Clinical concerns: None           4 minutes for nursing intake (face to face time)     Zora Bentley MA          DISCHARGE PLAN:    Continue chemo: Zora escorted Amira back to infusion/ Called and RN called back to report she is ok for treatment today    Dates given to  to arrange with patients daughter:    9/20 labs, velcade, darzolex  9/27: labs , velcade  10/4/: labs and velcade, darzolex  10/11/17- labs, velcade and Dr. Roberts    Next appointments: See patient instruction section  Departure Mode: Ambulatory  Accompanied by: daughter  12 minutes for nursing discharge (face to face time)   Tameka Daigle RN      "

## 2017-09-13 NOTE — PROGRESS NOTES
Infusion Nursing Note:  Amira Arreola presents today for C4D22 Velcade.    Patient seen by provider today: Yes: Dr. Roberts   present during visit today: Not Applicable.    Note: N/A.    Intravenous Access:  Labs drawn without difficulty.  Implanted Port.    Treatment Conditions:  Lab Results   Component Value Date    HGB 12.4 09/13/2017     Lab Results   Component Value Date    WBC 6.6 09/13/2017      Lab Results   Component Value Date    ANEU 6.0 09/13/2017     Lab Results   Component Value Date     09/13/2017      Results reviewed, labs MET treatment parameters, ok to proceed with treatment.        Post Infusion Assessment:  Patient tolerated injection without incident.  Site patent and intact, free from redness, edema or discomfort.  No evidence of extravasations.  Access discontinued per protocol.    Discharge Plan:   Discharge instructions reviewed with: Patient and Family.  Patient and/or family verbalized understanding of discharge instructions and all questions answered.  Copy of AVS reviewed with patient and/or family.  Patient will return 9/20/17 for next appointment.  Patient discharged in stable condition accompanied by: daughter.  Departure Mode: Ambulatory.    David Kearney RN

## 2017-09-14 ENCOUNTER — ANTICOAGULATION THERAPY VISIT (OUTPATIENT)
Dept: NURSING | Facility: CLINIC | Age: 82
End: 2017-09-14
Payer: COMMERCIAL

## 2017-09-14 DIAGNOSIS — Z79.01 LONG-TERM (CURRENT) USE OF ANTICOAGULANTS: ICD-10-CM

## 2017-09-14 LAB
INR POINT OF CARE: 1.4 (ref 0.86–1.14)
INR POINT OF CARE: 1.9 (ref 0.86–1.14)

## 2017-09-14 PROCEDURE — 85610 PROTHROMBIN TIME: CPT | Mod: QW

## 2017-09-14 PROCEDURE — 99207 ZZC NO CHARGE NURSE ONLY: CPT

## 2017-09-14 PROCEDURE — 36416 COLLJ CAPILLARY BLOOD SPEC: CPT

## 2017-09-14 RX ORDER — DIPHENHYDRAMINE HYDROCHLORIDE 50 MG/ML
50 INJECTION INTRAMUSCULAR; INTRAVENOUS
Status: CANCELLED
Start: 2017-09-27

## 2017-09-14 RX ORDER — LORAZEPAM 2 MG/ML
0.5 INJECTION INTRAMUSCULAR EVERY 4 HOURS PRN
Status: CANCELLED
Start: 2017-10-11

## 2017-09-14 RX ORDER — LORAZEPAM 2 MG/ML
0.5 INJECTION INTRAMUSCULAR EVERY 4 HOURS PRN
Status: CANCELLED
Start: 2017-09-27

## 2017-09-14 RX ORDER — EPINEPHRINE 0.3 MG/.3ML
0.3 INJECTION SUBCUTANEOUS EVERY 5 MIN PRN
Status: CANCELLED | OUTPATIENT
Start: 2017-10-04

## 2017-09-14 RX ORDER — EPINEPHRINE 1 MG/ML
0.3 INJECTION INTRAMUSCULAR; INTRAVENOUS; SUBCUTANEOUS EVERY 5 MIN PRN
Status: CANCELLED | OUTPATIENT
Start: 2017-09-20

## 2017-09-14 RX ORDER — EPINEPHRINE 1 MG/ML
0.3 INJECTION INTRAMUSCULAR; INTRAVENOUS; SUBCUTANEOUS EVERY 5 MIN PRN
Status: CANCELLED | OUTPATIENT
Start: 2017-09-27

## 2017-09-14 RX ORDER — EPINEPHRINE 0.3 MG/.3ML
0.3 INJECTION SUBCUTANEOUS EVERY 5 MIN PRN
Status: CANCELLED | OUTPATIENT
Start: 2017-10-11

## 2017-09-14 RX ORDER — HEPARIN SODIUM (PORCINE) LOCK FLUSH IV SOLN 100 UNIT/ML 100 UNIT/ML
5 SOLUTION INTRAVENOUS EVERY 8 HOURS
Status: CANCELLED
Start: 2017-09-20

## 2017-09-14 RX ORDER — ACETAMINOPHEN 325 MG/1
650 TABLET ORAL ONCE
Status: CANCELLED | OUTPATIENT
Start: 2017-09-20

## 2017-09-14 RX ORDER — HEPARIN SODIUM (PORCINE) LOCK FLUSH IV SOLN 100 UNIT/ML 100 UNIT/ML
5 SOLUTION INTRAVENOUS EVERY 8 HOURS
Status: CANCELLED
Start: 2017-10-11

## 2017-09-14 RX ORDER — SODIUM CHLORIDE 9 MG/ML
1000 INJECTION, SOLUTION INTRAVENOUS CONTINUOUS PRN
Status: CANCELLED
Start: 2017-10-11

## 2017-09-14 RX ORDER — MEPERIDINE HYDROCHLORIDE 50 MG/ML
25 INJECTION INTRAMUSCULAR; INTRAVENOUS; SUBCUTANEOUS EVERY 30 MIN PRN
Status: CANCELLED | OUTPATIENT
Start: 2017-09-20

## 2017-09-14 RX ORDER — METHYLPREDNISOLONE SODIUM SUCCINATE 125 MG/2ML
125 INJECTION, POWDER, LYOPHILIZED, FOR SOLUTION INTRAMUSCULAR; INTRAVENOUS
Status: CANCELLED
Start: 2017-09-27

## 2017-09-14 RX ORDER — ALBUTEROL SULFATE 0.83 MG/ML
2.5 SOLUTION RESPIRATORY (INHALATION)
Status: CANCELLED | OUTPATIENT
Start: 2017-09-20

## 2017-09-14 RX ORDER — DIPHENHYDRAMINE HCL 25 MG
50 CAPSULE ORAL ONCE
Status: CANCELLED | OUTPATIENT
Start: 2017-09-20

## 2017-09-14 RX ORDER — DIPHENHYDRAMINE HYDROCHLORIDE 50 MG/ML
50 INJECTION INTRAMUSCULAR; INTRAVENOUS
Status: CANCELLED
Start: 2017-09-20

## 2017-09-14 RX ORDER — EPINEPHRINE 1 MG/ML
0.3 INJECTION INTRAMUSCULAR; INTRAVENOUS; SUBCUTANEOUS EVERY 5 MIN PRN
Status: CANCELLED | OUTPATIENT
Start: 2017-10-04

## 2017-09-14 RX ORDER — EPINEPHRINE 0.3 MG/.3ML
0.3 INJECTION SUBCUTANEOUS EVERY 5 MIN PRN
Status: CANCELLED | OUTPATIENT
Start: 2017-09-27

## 2017-09-14 RX ORDER — LORAZEPAM 2 MG/ML
0.5 INJECTION INTRAMUSCULAR EVERY 4 HOURS PRN
Status: CANCELLED
Start: 2017-10-04

## 2017-09-14 RX ORDER — SODIUM CHLORIDE 9 MG/ML
1000 INJECTION, SOLUTION INTRAVENOUS CONTINUOUS PRN
Status: CANCELLED
Start: 2017-10-04

## 2017-09-14 RX ORDER — ALBUTEROL SULFATE 90 UG/1
1-2 AEROSOL, METERED RESPIRATORY (INHALATION)
Status: CANCELLED
Start: 2017-09-20

## 2017-09-14 RX ORDER — ALBUTEROL SULFATE 0.83 MG/ML
2.5 SOLUTION RESPIRATORY (INHALATION)
Status: CANCELLED | OUTPATIENT
Start: 2017-10-04

## 2017-09-14 RX ORDER — HEPARIN SODIUM (PORCINE) LOCK FLUSH IV SOLN 100 UNIT/ML 100 UNIT/ML
5 SOLUTION INTRAVENOUS EVERY 8 HOURS
Status: CANCELLED
Start: 2017-10-04

## 2017-09-14 RX ORDER — ALBUTEROL SULFATE 90 UG/1
1-2 AEROSOL, METERED RESPIRATORY (INHALATION)
Status: CANCELLED
Start: 2017-09-27

## 2017-09-14 RX ORDER — EPINEPHRINE 0.3 MG/.3ML
0.3 INJECTION SUBCUTANEOUS EVERY 5 MIN PRN
Status: CANCELLED | OUTPATIENT
Start: 2017-09-20

## 2017-09-14 RX ORDER — ALBUTEROL SULFATE 90 UG/1
1-2 AEROSOL, METERED RESPIRATORY (INHALATION)
Status: CANCELLED
Start: 2017-10-04

## 2017-09-14 RX ORDER — METHYLPREDNISOLONE SODIUM SUCCINATE 125 MG/2ML
125 INJECTION, POWDER, LYOPHILIZED, FOR SOLUTION INTRAMUSCULAR; INTRAVENOUS
Status: CANCELLED
Start: 2017-10-04

## 2017-09-14 RX ORDER — METHYLPREDNISOLONE SODIUM SUCCINATE 125 MG/2ML
125 INJECTION, POWDER, LYOPHILIZED, FOR SOLUTION INTRAMUSCULAR; INTRAVENOUS
Status: CANCELLED
Start: 2017-10-11

## 2017-09-14 RX ORDER — DIPHENHYDRAMINE HYDROCHLORIDE 50 MG/ML
50 INJECTION INTRAMUSCULAR; INTRAVENOUS
Status: CANCELLED
Start: 2017-10-11

## 2017-09-14 RX ORDER — EPINEPHRINE 1 MG/ML
0.3 INJECTION INTRAMUSCULAR; INTRAVENOUS; SUBCUTANEOUS EVERY 5 MIN PRN
Status: CANCELLED | OUTPATIENT
Start: 2017-10-11

## 2017-09-14 RX ORDER — MEPERIDINE HYDROCHLORIDE 50 MG/ML
25 INJECTION INTRAMUSCULAR; INTRAVENOUS; SUBCUTANEOUS EVERY 30 MIN PRN
Status: CANCELLED | OUTPATIENT
Start: 2017-10-04

## 2017-09-14 RX ORDER — DIPHENHYDRAMINE HYDROCHLORIDE 50 MG/ML
50 INJECTION INTRAMUSCULAR; INTRAVENOUS
Status: CANCELLED
Start: 2017-10-04

## 2017-09-14 RX ORDER — HEPARIN SODIUM (PORCINE) LOCK FLUSH IV SOLN 100 UNIT/ML 100 UNIT/ML
5 SOLUTION INTRAVENOUS EVERY 8 HOURS
Status: CANCELLED
Start: 2017-09-27

## 2017-09-14 RX ORDER — ALBUTEROL SULFATE 0.83 MG/ML
2.5 SOLUTION RESPIRATORY (INHALATION)
Status: CANCELLED | OUTPATIENT
Start: 2017-09-27

## 2017-09-14 RX ORDER — MEPERIDINE HYDROCHLORIDE 50 MG/ML
25 INJECTION INTRAMUSCULAR; INTRAVENOUS; SUBCUTANEOUS EVERY 30 MIN PRN
Status: CANCELLED | OUTPATIENT
Start: 2017-09-27

## 2017-09-14 RX ORDER — DIPHENHYDRAMINE HCL 25 MG
50 CAPSULE ORAL ONCE
Status: CANCELLED | OUTPATIENT
Start: 2017-10-04

## 2017-09-14 RX ORDER — ACETAMINOPHEN 325 MG/1
650 TABLET ORAL ONCE
Status: CANCELLED | OUTPATIENT
Start: 2017-10-04

## 2017-09-14 RX ORDER — LORAZEPAM 2 MG/ML
0.5 INJECTION INTRAMUSCULAR EVERY 4 HOURS PRN
Status: CANCELLED
Start: 2017-09-20

## 2017-09-14 RX ORDER — ALBUTEROL SULFATE 0.83 MG/ML
2.5 SOLUTION RESPIRATORY (INHALATION)
Status: CANCELLED | OUTPATIENT
Start: 2017-10-11

## 2017-09-14 RX ORDER — METHYLPREDNISOLONE SODIUM SUCCINATE 125 MG/2ML
125 INJECTION, POWDER, LYOPHILIZED, FOR SOLUTION INTRAMUSCULAR; INTRAVENOUS
Status: CANCELLED
Start: 2017-09-20

## 2017-09-14 RX ORDER — SODIUM CHLORIDE 9 MG/ML
1000 INJECTION, SOLUTION INTRAVENOUS CONTINUOUS PRN
Status: CANCELLED
Start: 2017-09-20

## 2017-09-14 RX ORDER — SODIUM CHLORIDE 9 MG/ML
1000 INJECTION, SOLUTION INTRAVENOUS CONTINUOUS PRN
Status: CANCELLED
Start: 2017-09-27

## 2017-09-14 RX ORDER — ALBUTEROL SULFATE 90 UG/1
1-2 AEROSOL, METERED RESPIRATORY (INHALATION)
Status: CANCELLED
Start: 2017-10-11

## 2017-09-14 RX ORDER — MEPERIDINE HYDROCHLORIDE 50 MG/ML
25 INJECTION INTRAMUSCULAR; INTRAVENOUS; SUBCUTANEOUS EVERY 30 MIN PRN
Status: CANCELLED | OUTPATIENT
Start: 2017-10-11

## 2017-09-14 NOTE — PROGRESS NOTES
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     1.  She had work-up for thrombocytopenia.       - On 09/21/2015, WBC of 4.2, hemoglobin of 13.2 and platelets of 138. CMP normal except mildly low protein of 6.4.   -On 09/29/2015, SPEP does not reveal any M-spike.   - On 10/02/2015, JANET does not reveal any monoclonal protein.     - On 10/22/2015, urine immunofixation reveals monoclonal free kappa light chain.    2. On 05/11/2016, kappa light chain of 50, lambda light chain of 0.32 and ratio of kappa to lambda of 156.2.  3. Skeletal survey on 05/23/2016 does not reveal any lytic lesion.    4. Bone marrow biopsy on 05/25/2016 reveals 40-50% kappa light chain restricted plasma cells.  Cytogenetics is normal. 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    5. MRI of bones on 06/21/2016 and 06/22/2016 reveals myeloma lesions.  6. On 08/24/2016, she was started on revlimid 25 mg 3 weeks on and 1 week off along with dexamethasone 20 mg weekly. Due to cytopenia, dose was subsequently reduced to 15 mg a day. Treatment in between had to be delayed because of cytopenia. She did not have any significant response to treatment.   7. Velcade and dexamethasone started on 03/21/2017.    8. On 03/21/2017, kappa free light chain was 52.5.  It decreased to 41.75 on 04/18/2017.  It  increased to 60.75 on 05/16/2017.    9. Daratumumab added on 05/31/2017.   10.  On 06/28/2017, kappa free light chain is down to 6.43.  11.  On 07/26/2017, kappa free light chain is 13.10.  12.  On 08/23/2017, kappa free light chain is 8.29.    SUBJECTIVE:    Ms. Amira Arreola is an 85-year-old female with kappa free light chain multiple myeloma.  She is on Velcade, dexamethasone and daratumumab.  Overall, she is tolerating it well.  Disease is responding.      Overall, she is stable.   On and off she gets some pain in the right pelvis and hip area.  She also has some pain in the back.  She takes oxycodone about 2 a day.  That helps.  No numbness, tingling or weakness in the lower extremities.        She has some fatigue.  It is not getting worse.  No headache or dizziness.  No chest pain or difficulty breathing.  No nausea or vomiting.  No bleeding.  No fevers or chills.      PHYSICAL EXAMINATION:   Alert and oriented x 3.   EYES:  No icterus.   THROAT:  No ulcer or thrush.   NECK:  Supple. No lymphadenopathy.   AXILLAE:  No lymphadenopathy.   LUNGS:  Good air entry bilaterally.  No crackles or wheezing.   HEART:  Regular.  No murmur.   ABDOMEN:  Soft and nontender.  No mass.   EXTREMITIES: Bilateral pedal edema. No calf swelling or tenderness.   SKIN:  No rash.       LABORATORY DATA:  Reviewed.      ASSESSMENT:   1.  An 85-year-old female with kappa free light chain multiple myeloma which is responding to daratumumab, Velcade and dexamethasone.   2.  Low back pain and right pelvic pain secondary to myeloma.   3.  Mild thrombocytopenia.   4.  Fatigue.      PLAN:   1.  The patient was accompanied by her daughters.  One of them is an ophthalmologist. Discussed regarding multiple myeloma.  She is responding to treatment.  She will continue on daratumumab, Velcade and dexamethasone.  Overall, she is tolerating it well.  So far, she does not have any neuropathic symptoms.      2.  Discussed regarding her pain. It is mainly due to myeloma bone lesion.  Some is from arthritis.  She will continue on oxycodone.  Prescription will be filled.       3.  Patient has fatigue.  This is mainly due to her age.  Some of it is also due to myeloma and treatment.       4.  For bone health, the patient will continue on Zometa every 3 months.  She is not having any dental issues.     5.  She and her family had multiple questions which were all answered.  I will see her back in about a month.         ITZ LOPEZ MD              D: 2017 17:18   T: 2017 22:08   MT:       Name:     ALBERTO PARSON   MRN:      [id number]        Account:      [id number]   :      1932           Visit Date:   2017      Document: [id number]

## 2017-09-14 NOTE — MR AVS SNAPSHOT
Amira Arreola   9/14/2017 10:00 AM   Anticoagulation Therapy Visit    Description:  85 year old female   Provider:   ANTICOAGULATION CLINIC   Department:  Ec Nurse           INR as of 9/14/2017     Today's INR 1.9!      Anticoagulation Summary as of 9/14/2017     INR goal 2.0-3.0   Today's INR 1.9!   Full instructions 6 mg on Mon, Fri; 4 mg all other days   Next INR check 9/21/2017    Indications   Long-term (current) use of anticoagulants [Z79.01] [Z79.01]  Atrial fibrillation (H) [I48.91] (Resolved) [I48.91]         Description     INR 1.9 today.  Takes 6 mg on Mon, Fri;  4 mg all other days = 32 mg weekly.  Recheck in 1 week.         Your next Anticoagulation Clinic appointment(s)     Sep 21, 2017  9:45 AM CDT   Anticoagulation Visit with  ANTICOAGULATION CLINIC   [facility] ([facility])    [address] [place] [id number] [phone]              Contact Numbers     Clinic Number:         September 2017 Details    Sun Mon Tue Wed Thu Fri Sat          1               2                 3               4               5               6               7               8               9                 10               11               12               13               14      4 mg   See details      15      6 mg         16      4 mg           17      4 mg         18      6 mg         19      4 mg         20      4 mg         21            22               23                 24               25               26               27               28               29               30                Date Details   09/14 This INR check       Date of next INR:  9/21/2017         How to take your warfarin dose     To take:  4 mg Take 1 of the 4 mg tablets.    To take:  6 mg Take 1.5 of the 4 mg tablets.

## 2017-09-14 NOTE — PROGRESS NOTES
ANTICOAGULATION FOLLOW-UP CLINIC VISIT    Patient Name:  Amira Arreola  Date:  9/14/2017  Contact Type:  Face to Face    SUBJECTIVE:     Patient Findings     Positives Change in diet/appetite    Comments Resume regular - was lack of appetite last week due to diarrhea            OBJECTIVE    INR Protime   Date Value Ref Range Status   09/14/2017 1.4 (A) 0.86 - 1.14 Final   09/14/2017 1.9 (A) 0.86 - 1.14 Final       ASSESSMENT / PLAN  INR assessment THER    Recheck INR In: 1 WEEK    INR Location Clinic      Anticoagulation Summary as of 9/14/2017     INR goal 2.0-3.0   Today's INR 1.9!   Maintenance plan 6 mg (4 mg x 1.5) on Mon, Fri; 4 mg (4 mg x 1) all other days   Full instructions 6 mg on Mon, Fri; 4 mg all other days   Weekly total 32 mg   Plan last modified Dayana Barrear RN (9/14/2017)   Next INR check 9/21/2017   Target end date Indefinite    Indications   Long-term (current) use of anticoagulants [Z79.01] [Z79.01]  Atrial fibrillation (H) [I48.91] (Resolved) [I48.91]         Anticoagulation Episode Summary     INR check location     Preferred lab     Send INR reminders to [facility]    Comments       Anticoagulation Care Providers     Provider Role Specialty Phone number    Addy Frias MD [facility] Internal Medicine [phone]            See the Encounter Report to view Anticoagulation Flowsheet and Dosing Calendar (Go to Encounters tab in chart review, and find the Anticoagulation Therapy Visit)    Dosage adjustment made based on physician directed care plan.    INR 1.9 today.  Takes 6 mg on Mon, Fri;  4 mg all other days = 32 mg weekly.  Recheck in 1 week.     Dayana Barrera RN

## 2017-09-20 ENCOUNTER — HOSPITAL ENCOUNTER (OUTPATIENT)
Facility: CLINIC | Age: 82
Setting detail: SPECIMEN
Discharge: HOME | End: 2017-09-20
Attending: INTERNAL MEDICINE | Admitting: INTERNAL MEDICINE
Payer: MEDICARE

## 2017-09-20 ENCOUNTER — INFUSION THERAPY VISIT (OUTPATIENT)
Dept: INFUSION THERAPY | Facility: CLINIC | Age: 82
End: 2017-09-20
Attending: INTERNAL MEDICINE
Payer: MEDICARE

## 2017-09-20 VITALS
WEIGHT: 136.8 LBS | BODY MASS INDEX: 23.47 KG/M2 | TEMPERATURE: 98.2 F | SYSTOLIC BLOOD PRESSURE: 131 MMHG | RESPIRATION RATE: 16 BRPM | DIASTOLIC BLOOD PRESSURE: 68 MMHG | HEART RATE: 75 BPM

## 2017-09-20 DIAGNOSIS — C90.00 MULTIPLE MYELOMA NOT HAVING ACHIEVED REMISSION (H): Primary | ICD-10-CM

## 2017-09-20 LAB
ALBUMIN SERPL-MCNC: 3.4 G/DL (ref 3.4–5)
ALP SERPL-CCNC: 43 U/L (ref 40–150)
ALT SERPL W P-5'-P-CCNC: 25 U/L (ref 0–50)
AST SERPL W P-5'-P-CCNC: 20 U/L (ref 0–45)
BASOPHILS # BLD AUTO: 0 10E9/L (ref 0–0.2)
BASOPHILS NFR BLD AUTO: 0 %
BILIRUB DIRECT SERPL-MCNC: 0.1 MG/DL (ref 0–0.2)
BILIRUB SERPL-MCNC: 0.5 MG/DL (ref 0.2–1.3)
DIFFERENTIAL METHOD BLD: ABNORMAL
EOSINOPHIL # BLD AUTO: 0.1 10E9/L (ref 0–0.7)
EOSINOPHIL NFR BLD AUTO: 1.1 %
ERYTHROCYTE [DISTWIDTH] IN BLOOD BY AUTOMATED COUNT: 14.8 % (ref 10–15)
HCT VFR BLD AUTO: 34.4 % (ref 35–47)
HGB BLD-MCNC: 12 G/DL (ref 11.7–15.7)
IMM GRANULOCYTES # BLD: 0 10E9/L (ref 0–0.4)
IMM GRANULOCYTES NFR BLD: 0.2 %
LYMPHOCYTES # BLD AUTO: 1.2 10E9/L (ref 0.8–5.3)
LYMPHOCYTES NFR BLD AUTO: 21.4 %
MCH RBC QN AUTO: 34.9 PG (ref 26.5–33)
MCHC RBC AUTO-ENTMCNC: 34.9 G/DL (ref 31.5–36.5)
MCV RBC AUTO: 100 FL (ref 78–100)
MONOCYTES # BLD AUTO: 0.6 10E9/L (ref 0–1.3)
MONOCYTES NFR BLD AUTO: 11.3 %
NEUTROPHILS # BLD AUTO: 3.6 10E9/L (ref 1.6–8.3)
NEUTROPHILS NFR BLD AUTO: 66 %
NRBC # BLD AUTO: 0 10*3/UL
NRBC BLD AUTO-RTO: 0 /100
PLATELET # BLD AUTO: 122 10E9/L (ref 150–450)
PROT SERPL-MCNC: 5.9 G/DL (ref 6.8–8.8)
RBC # BLD AUTO: 3.44 10E12/L (ref 3.8–5.2)
WBC # BLD AUTO: 5.5 10E9/L (ref 4–11)

## 2017-09-20 PROCEDURE — 96415 CHEMO IV INFUSION ADDL HR: CPT

## 2017-09-20 PROCEDURE — 83883 ASSAY NEPHELOMETRY NOT SPEC: CPT | Performed by: INTERNAL MEDICINE

## 2017-09-20 PROCEDURE — 96413 CHEMO IV INFUSION 1 HR: CPT

## 2017-09-20 PROCEDURE — 00000402 ZZHCL STATISTIC TOTAL PROTEIN: Performed by: INTERNAL MEDICINE

## 2017-09-20 PROCEDURE — 25000132 ZZH RX MED GY IP 250 OP 250 PS 637: Mod: GY | Performed by: INTERNAL MEDICINE

## 2017-09-20 PROCEDURE — 85025 COMPLETE CBC W/AUTO DIFF WBC: CPT | Performed by: INTERNAL MEDICINE

## 2017-09-20 PROCEDURE — 96367 TX/PROPH/DG ADDL SEQ IV INF: CPT

## 2017-09-20 PROCEDURE — 80076 HEPATIC FUNCTION PANEL: CPT | Performed by: INTERNAL MEDICINE

## 2017-09-20 PROCEDURE — A9270 NON-COVERED ITEM OR SERVICE: HCPCS | Mod: GY | Performed by: INTERNAL MEDICINE

## 2017-09-20 PROCEDURE — 25000128 H RX IP 250 OP 636: Performed by: INTERNAL MEDICINE

## 2017-09-20 PROCEDURE — 84165 PROTEIN E-PHORESIS SERUM: CPT | Performed by: INTERNAL MEDICINE

## 2017-09-20 PROCEDURE — 96401 CHEMO ANTI-NEOPL SQ/IM: CPT

## 2017-09-20 RX ORDER — HEPARIN SODIUM (PORCINE) LOCK FLUSH IV SOLN 100 UNIT/ML 100 UNIT/ML
5 SOLUTION INTRAVENOUS EVERY 8 HOURS
Status: DISCONTINUED | OUTPATIENT
Start: 2017-09-20 | End: 2017-09-20 | Stop reason: HOSPADM

## 2017-09-20 RX ORDER — DEXAMETHASONE 4 MG/1
TABLET ORAL
Qty: 28 TABLET | Refills: 0 | Status: SHIPPED | OUTPATIENT
Start: 2017-09-20 | End: 2017-12-06

## 2017-09-20 RX ORDER — ACETAMINOPHEN 325 MG/1
650 TABLET ORAL ONCE
Status: COMPLETED | OUTPATIENT
Start: 2017-09-20 | End: 2017-09-20

## 2017-09-20 RX ADMIN — BORTEZOMIB 2.2 MG: 3.5 INJECTION, POWDER, LYOPHILIZED, FOR SOLUTION INTRAVENOUS; SUBCUTANEOUS at 12:48

## 2017-09-20 RX ADMIN — DIPHENHYDRAMINE HYDROCHLORIDE 50 MG: 50 INJECTION, SOLUTION INTRAMUSCULAR; INTRAVENOUS at 10:17

## 2017-09-20 RX ADMIN — ACETAMINOPHEN 650 MG: 325 TABLET ORAL at 10:49

## 2017-09-20 RX ADMIN — DARATUMUMAB 1000 MG: 100 INJECTION, SOLUTION, CONCENTRATE INTRAVENOUS at 10:44

## 2017-09-20 RX ADMIN — SODIUM CHLORIDE 250 ML: 9 INJECTION, SOLUTION INTRAVENOUS at 09:59

## 2017-09-20 RX ADMIN — SODIUM CHLORIDE, PRESERVATIVE FREE 5 ML: 5 INJECTION INTRAVENOUS at 13:53

## 2017-09-20 RX ADMIN — DEXAMETHASONE SODIUM PHOSPHATE 12 MG: 10 INJECTION, SOLUTION INTRAMUSCULAR; INTRAVENOUS at 10:01

## 2017-09-20 ASSESSMENT — PAIN SCALES - GENERAL: PAINLEVEL: NO PAIN (0)

## 2017-09-20 NOTE — PROGRESS NOTES
Infusion Nursing Note:  Amira Arreola presents today for Velcade;Daratumumab C5D1.    Patient seen by provider today: No   present during visit today: Not Applicable.    Note: N/A.    Intravenous Access:  Labs drawn without difficulty.  Implanted Port.    Treatment Conditions:  Lab Results   Component Value Date    HGB 12.0 09/20/2017     Lab Results   Component Value Date    WBC 5.5 09/20/2017      Lab Results   Component Value Date    ANEU 3.6 09/20/2017     Lab Results   Component Value Date     09/20/2017      Results reviewed, labs MET treatment parameters, ok to proceed with treatment.    Post Infusion Assessment:Patient tolerated infusion without incident.  Patient tolerated injection without incident.  Blood return noted pre and post infusion.  Site patent and intact, free from redness, edema or discomfort.  No evidence of extravasations.  Access discontinued per protocol.    Discharge Plan:   Discharge instructions reviewed with: Patient.  Patient and/or family verbalized understanding of discharge instructions and all questions answered.  Copy of AVS reviewed with patient and/or family.  Patient will return 10/4/2017 for next appointment.  Patient discharged in stable condition accompanied by: self.  Departure Mode: Ambulatory.    Caitie Josue RN

## 2017-09-20 NOTE — MR AVS SNAPSHOT
After Visit Summary   9/20/2017    Amira Arreola    MRN: [id number]           Patient Information     Date Of Birth          7/17/1932        Visit Information        Provider Department      9/20/2017 9:00 AM  INFUSION CHAIR 14 [facility]        Today's Diagnoses     Multiple myeloma not having achieved remission (H)    -  1       Follow-ups after your visit        Your next 10 appointments already scheduled     Sep 21, 2017  9:45 AM CDT   Anticoagulation Visit with [facility] ANTICOAGULATION CLINIC   [facility] ([address] [address]   [phone]            Sep 21, 2017 10:45 AM CDT   Return Visit with Ramos Rivera MD   [facility] ([facility])    [address] [place] [address] [address]  [place] [address]   [phone]            Sep 27, 2017  9:30 AM CDT   Level 2 with  INFUSION CHAIR 14   [facility] ([facility])    [facility]  [address]  [place] [address]   [phone]            Oct 04, 2017  9:00 AM CDT   Level 4 with  INFUSION CHAIR 12   [facility] ([facility])    [facility]  [address]  [place] [address]   [phone]            Oct 11, 2017 10:00 AM CDT   Level 2 with  INFUSION CHAIR 10   [facility] ([facility])    [facility]  [address]  [place] [address]   [phone]            Oct 11, 2017 10:30 AM CDT   Return Visit with Shayne Roberts MD   [facility] ([facility])    [facility]  [address]  [place] [address] [phone]              Who to contact     If you have questions or need follow up  "information about today's clinic visit or your schedule please contact [facility] directly at [phone].  Normal or non-critical lab and imaging results will be communicated to you by [email or web address], letter or phone within 4 business days after the clinic has received the results. If you do not hear from us within 7 days, please contact the clinic through [email or web address] or phone. If you have a critical or abnormal lab result, we will notify you by phone as soon as possible.  Submit refill requests through [email or web address] or call your pharmacy and they will forward the refill request to us. Please allow 3 business days for your refill to be completed.          Additional Information About Your Visit        [email or web address] Information     [email or web address] lets you send messages to your doctor, view your test results, renew your prescriptions, schedule appointments and more. To sign up, go to [email or web address] . Click on \"Log in\" on the left side of the screen, which will take you to the Welcome page. Then click on \"Sign up Now\" on the right side of the page.     You will be asked to enter the access code listed below, as well as some personal information. Please follow the directions to create your username and password.     Your access code is: [email or web address]  Expires: 2017  2:33 PM     Your access code will  in 90 days. If you need help or a new code, please call your [facility] clinic or [phone].        Care EveryWhere ID     This is your Care EveryWhere ID. This could be used by other organizations to access your [facility] medical records  [id number]        Your Vitals Were     Pulse Temperature Respirations BMI (Body Mass Index)          75 98.2  F (36.8  C) (Oral) 16 23.47 kg/m2         Blood Pressure from Last 3 Encounters:   17 131/68   17 126/79   17 126/79    Weight from Last 3 Encounters:   17 62.1 kg (136 lb 12.8 oz)   17 60.9 kg (134 lb 3.2 oz)   17 60.9 " kg (134 lb 3.2 oz)              We Performed the Following     CBC with platelets differential     Hepatic panel     Kappa and lambda light chain     Protein electrophoresis          Today's Medication Changes          These changes are accurate as of: 9/20/17  2:01 PM.  If you have any questions, ask your nurse or doctor.               These medicines have changed or have updated prescriptions.        Dose/Directions    * dexamethasone 4 MG tablet   Commonly known as:  DECADRON   This may have changed:  Another medication with the same name was added. Make sure you understand how and when to take each.   Used for:  Multiple myeloma not having achieved remission (H)        Take 20mg (5 tablets) PO every week on the morning of velcade injection. Then take 1 tablet (4mg) by mouth for two days after darzalex.   Quantity:  28 tablet   Refills:  0       * dexamethasone 4 MG tablet   Commonly known as:  DECADRON   This may have changed:  You were already taking a medication with the same name, and this prescription was added. Make sure you understand how and when to take each.   Used for:  Multiple myeloma not having achieved remission (H)        Take 20mg (5 tablets) by mouth every week on the morning of velcade injection.   Quantity:  28 tablet   Refills:  0       * Notice:  This list has 2 medication(s) that are the same as other medications prescribed for you. Read the directions carefully, and ask your doctor or other care provider to review them with you.         Where to get your medicines      These medications were sent to [facility] [address] [place] [facility] [address] [facility] [address] [facility] [address]  [address], [facility] [address]     Phone:  [phone]     dexamethasone 4 MG tablet                Primary Care Provider Office Phone # Fax #    Addy Frias [phone] [address] [facility] [address]  [facility] [place] [address]        Equal Access to Services     SARA ZELAYA AH: Gissel  liane Galloway, wajanetda luabigailalexis, qaomidta kawen gupta, waxsanford lópez erinmorteza maradiagaedgardoporfirio dominique doroteo. So [facility] [phone].    ATENCIÓN: Si habla vivian, tiene a barlow disposición servicios gratuitos de asistencia lingüística. Kadie al [phone].    We comply with applicable federal civil rights laws and [place] laws. We do not discriminate on the basis of race, color, national origin, age, disability sex, sexual orientation or gender identity.            Thank you!     Thank you for choosing [facility]  for your care. Our goal is always to provide you with excellent care. Hearing back from our patients is one way we can continue to improve our services. Please take a few minutes to complete the written survey that you may receive in the mail after your visit with us. Thank you!             Your Updated Medication List - Protect others around you: Learn how to safely use, store and throw away your medicines at www.disposemymeds.org.          This list is accurate as of: 9/20/17  2:01 PM.  Always use your most recent med list.                   Brand Name Dispense Instructions for use Diagnosis    acyclovir 400 MG tablet    ZOVIRAX    60 tablet    Take 1 tablet (400 mg) by mouth 2 times daily Viral Prophylaxis.    Multiple myeloma not having achieved remission (H)       ASPIRIN NOT PRESCRIBED    INTENTIONAL    0 each    Antiplatelet medication not prescribed intentionally due to Current anticoagulant therapy (warfarin/enoxaparin)    Paroxysmal atrial fibrillation (H)       CALCIUM CITRATE + PO      Take 2,000 mg by mouth daily 2 tabs        carboxymethylcellulose 0.5 % Soln ophthalmic solution    REFRESH PLUS     1 drop 4 times daily        CLARINEX PO      Take by mouth daily Taking claritin        COMPAZINE PO      Take 10 mg by mouth daily as needed        cycloSPORINE 0.05 % ophthalmic emulsion    RESTASIS     Place 1 drop into both eyes every 12 hours        DAILY  MULTIVITAMIN PO      Take 1 tablet by mouth daily.    Routine general medical examination at a health care facility       * dexamethasone 4 MG tablet    DECADRON    28 tablet    Take 20mg (5 tablets) PO every week on the morning of velcade injection. Then take 1 tablet (4mg) by mouth for two days after darzalex.    Multiple myeloma not having achieved remission (H)       * dexamethasone 4 MG tablet    DECADRON    28 tablet    Take 20mg (5 tablets) by mouth every week on the morning of velcade injection.    Multiple myeloma not having achieved remission (H)       erythromycin ophthalmic ointment    ROMYCIN     Place 1 Application into both eyes At Bedtime        GENTLE STOOL SOFTENER PO      Take 100 mg by mouth daily        lidocaine-prilocaine cream    EMLA    30 g    Apply topically as needed for moderate pain Apply dollop size amount to port site 30-60 min prior to accessing    Multiple myeloma not having achieved remission (H)       LORazepam 0.5 MG tablet    ATIVAN    30 tablet    Take 1 tablet (0.5 mg) by mouth every 8 hours as needed for anxiety    Multiple myeloma not having achieved remission (H)       oxyCODONE 15 MG IR tablet    ROXICODONE    90 tablet    Take 1 tablet (15 mg) by mouth every 8 hours as needed for pain maximum 4 tablet(s) per day    Multiple myeloma not having achieved remission (H)       polyethylene glycol powder    MIRALAX/GLYCOLAX     Take 1 capful by mouth daily as needed    Bilateral leg edema       timolol 0.25 % ophthalmic solution    TIMOPTIC     Place 1 drop into the right eye 2 times daily        * TOPROL XL 50 MG 24 hr tablet   Generic drug:  metoprolol      Take 100 mg by mouth daily (with breakfast)        * metoprolol 50 MG 24 hr tablet    TOPROL XL    270 tablet    Take 2 tablets (100mg) in the morning and 1 tablet (50mg) in the evening by mouth daily    Paroxysmal atrial fibrillation (H)       TYLENOL PO      Take 500 mg by mouth every 6 hours as needed for mild pain or  fever        UNABLE TO FIND      MEDICATION NAME: Fresh Coat eye drops        VITAMIN D3 PO      Take 1,000 Units by mouth daily        warfarin 4 MG tablet    COUMADIN    110 tablet    TAKE ONE AND ONE-HALF TABLETS BY MOUTH ON MONDAY, WEDNESDAY, AND FRIDAY AND ONE TABLET THE OTHER DAYS OF THE WEEK    Paroxysmal atrial fibrillation (H), Long-term (current) use of anticoagulants       ZOMETA IV      Inject into the vein every 30 days Every 3 month dosing        * Notice:  This list has 4 medication(s) that are the same as other medications prescribed for you. Read the directions carefully, and ask your doctor or other care provider to review them with you.

## 2017-09-21 ENCOUNTER — OFFICE VISIT (OUTPATIENT)
Dept: CARDIOLOGY | Facility: CLINIC | Age: 82
End: 2017-09-21
Attending: INTERNAL MEDICINE
Payer: COMMERCIAL

## 2017-09-21 ENCOUNTER — ANTICOAGULATION THERAPY VISIT (OUTPATIENT)
Dept: NURSING | Facility: CLINIC | Age: 82
End: 2017-09-21
Payer: COMMERCIAL

## 2017-09-21 VITALS
WEIGHT: 138 LBS | BODY MASS INDEX: 22.18 KG/M2 | HEART RATE: 76 BPM | DIASTOLIC BLOOD PRESSURE: 72 MMHG | SYSTOLIC BLOOD PRESSURE: 130 MMHG | HEIGHT: 66 IN

## 2017-09-21 DIAGNOSIS — Z79.01 LONG-TERM (CURRENT) USE OF ANTICOAGULANTS: ICD-10-CM

## 2017-09-21 DIAGNOSIS — I48.0 PAROXYSMAL ATRIAL FIBRILLATION (H): ICD-10-CM

## 2017-09-21 LAB
ALBUMIN SERPL ELPH-MCNC: 3.7 G/DL (ref 3.7–5.1)
ALPHA1 GLOB SERPL ELPH-MCNC: 0.3 G/DL (ref 0.2–0.4)
ALPHA2 GLOB SERPL ELPH-MCNC: 0.6 G/DL (ref 0.5–0.9)
B-GLOBULIN SERPL ELPH-MCNC: 0.6 G/DL (ref 0.6–1)
GAMMA GLOB SERPL ELPH-MCNC: 0.3 G/DL (ref 0.7–1.6)
INR POINT OF CARE: 2.6 (ref 0.86–1.14)
KAPPA LC UR-MCNC: 4.17 MG/DL (ref 0.33–1.94)
KAPPA LC/LAMBDA SER: 6.84 {RATIO} (ref 0.26–1.65)
LAMBDA LC SERPL-MCNC: 0.61 MG/DL (ref 0.57–2.63)
M PROTEIN SERPL ELPH-MCNC: 0.1 G/DL
PROT PATTERN SERPL ELPH-IMP: ABNORMAL

## 2017-09-21 PROCEDURE — 36416 COLLJ CAPILLARY BLOOD SPEC: CPT

## 2017-09-21 PROCEDURE — 85610 PROTHROMBIN TIME: CPT | Mod: QW

## 2017-09-21 PROCEDURE — 99207 ZZC NO CHARGE NURSE ONLY: CPT

## 2017-09-21 PROCEDURE — 99213 OFFICE O/P EST LOW 20 MIN: CPT | Performed by: INTERNAL MEDICINE

## 2017-09-21 NOTE — PROGRESS NOTES
REASON FOR CLINIC VISIT:  Followup atrial fibrillation.      HISTORY OF PRESENT ILLNESS:  Ms. Arreola is a very pleasant 85-year-old female with multiple myeloma on chemotherapy, atrial fibrillation and hypertension.  She is essentially asymptomatic from atrial fibrillation, and is on rate control strategy with beta blocker.  She is on Coumadin for stroke prophylaxis with KNN2HX2-KJSo score of 4.  Echocardiogram showed normal LVEF, 1-2+ mitral regurgitation, mild to moderate bilateral atrial enlargement and pulmonary hypertension with RVSP of 36 mmHg plus RA with mild aortic root and ascending aorta dilatation measuring 3.8 cm.  Today, she is coming for routine followup.  She is continuing chemotherapy and tolerating it reasonably well.  She continues to remain asymptomatic from an atrial fibrillation standpoint of view.  No palpitation.  No chest discomfort, shortness of breath, presyncope or syncope.  Ventricular rates appear well controlled, resting ventricular rates in 70s today.  Blood pressure is well controlled.  She is tolerating Coumadin quite well without any issues.  To be noted, we have discussed option of rate control versus rhythm control strategy extensively in the past and we also again discussed it today and in view of no mortality benefit of 1 strategy over the other and essentially asymptomatic status, we will continue rate control strategy as we are.        PHYSICAL EXAMINATION:   VITAL SIGNS:  Blood pressure 130/72, heart rate 76 and regular, weight 138 pounds, BMI 22.32.   GENERAL:  The patient appears pleasant, comfortable.   NECK:  Normal JVP, no bruit.   CARDIOVASCULAR SYSTEM:  Irregular, normal rate, no murmur, rub or gallop.   RESPIRATORY SYSTEM:  Clear to auscultation bilaterally.   ABDOMEN:  Soft, nontender.   EXTREMITIES:  Minimal pitting pedal edema bilaterally.   HEENT:  No pallor or icterus.   PSYCHIATRIC:  Normal affect.   SKIN:  No obvious rash.      IMPRESSION AND PLAN:  A very  pleasant 85-year-old female with atrial fibrillation on rate control strategy with ventricular rates that appear quite well controlled, essentially asymptomatic from atrial fibrillation with a [id number] score of 4 on Coumadin for stroke prophylaxis, normal LV function, multiple myeloma on chemotherapy, and history of hypertension with blood pressure being well controlled.  Overall, cardiovascular status-wise, she appears stable.  If she continues to feel well cardiac wise, we can see her back in 6 months, sooner if any change in her clinical status.         MELISSA MONSIVAIS MD             D: 2017 11:25   T: 2017 14:23   MT: [email or web address]      Name:     ALBERTO PARSON   MRN:      [id number]        Account:      [id number]   :      1932           Service Date: 2017      Document: [id number]

## 2017-09-21 NOTE — MR AVS SNAPSHOT
After Visit Summary   9/21/2017    Amira Arreola    MRN: [id number]           Patient Information     Date Of Birth          7/17/1932        Visit Information        Provider Department      9/21/2017 10:45 AM Ramos Rivera MD [facility] [place]        Today's Diagnoses     Paroxysmal atrial fibrillation (H)           Follow-ups after your visit        Additional Services     Follow-Up with Cardiologist                 Your next 10 appointments already scheduled     Sep 27, 2017  9:30 AM CDT   Level 2 with  INFUSION CHAIR 14   [facility] ([facility])    [facility]  [address]  [place] [address]   [phone]            Oct 04, 2017  9:00 AM CDT   Level 4 with  INFUSION CHAIR 12   [facility] ([facility])    [facility]  [address]  [place] [address]   [phone]            Oct 06, 2017 10:00 AM CDT   Anticoagulation Visit with [facility] ANTICOAGULATION CLINIC   [facility] ([address] [address]   [phone]            Oct 11, 2017 10:00 AM CDT   Level 2 with  INFUSION CHAIR 10   [facility] ([facility])    [facility]  [address]  [place] [address]   [phone]            Oct 11, 2017 10:30 AM CDT   Return Visit with Shayne Roberts MD   [facility] ([facility])    [facility]  [address]  [place] [address]   [phone]              Future tests that were ordered for you today     Open Future Orders        Priority Expected Expires Ordered    Follow-Up with Cardiologist Routine 3/20/2018 9/21/2018 9/21/2017            Who to contact     If you have  "questions or need follow up information about today's clinic visit or your schedule please contact [facility] [place] directly at [phone].  Normal or non-critical lab and imaging results will be communicated to you by MyChart, letter or phone within 4 business days after the clinic has received the results. If you do not hear from us within 7 days, please contact the clinic through [email or web address] or phone. If you have a critical or abnormal lab result, we will notify you by phone as soon as possible.  Submit refill requests through [employer] or call your pharmacy and they will forward the refill request to us. Please allow 3 business days for your refill to be completed.          Additional Information About Your Visit        [email or web address] Information     [employer] lets you send messages to your doctor, view your test results, renew your prescriptions, schedule appointments and more. To sign up, go to [email or web address] . Click on \"Log in\" on the left side of the screen, which will take you to the Welcome page. Then click on \"Sign up Now\" on the right side of the page.     You will be asked to enter the access code listed below, as well as some personal information. Please follow the directions to create your username and password.     Your access code is: [email or web address]  Expires: 2017  2:33 PM     Your access code will  in 90 days. If you need help or a new code, please call your [facility] clinic or [phone].        Care EveryWhere ID     This is your Care EveryWhere ID. This could be used by other organizations to access your [facility] medical records  [id number]        Your Vitals Were     Pulse Height BMI (Body Mass Index)             76 1.676 m (5' 6\") 22.27 kg/m2          Blood Pressure from Last 3 Encounters:   17 130/72   17 131/68   17 126/79    Weight from Last 3 Encounters:   17 62.6 kg (138 lb)   17 62.1 kg (136 lb 12.8 oz)   17 " 60.9 kg (134 lb 3.2 oz)              We Performed the Following     Follow-Up with Cardiologist        Primary Care Provider Office Phone # Fax #    Addy Frias [phone] [address] [place] [address]        Equal Access to Services     HARINI SESAYSHAHAB : Hadii aad ku hadasho Soomaali, waaxda luqadaha, qaybta kaalmada adeegyada, waxay idiin hayaan adeeg khgutierrez la'aan . So [facility] [phone].    ATENCIÓN: Si habla español, tiene a barlow disposición servicios gratuitos de asistencia lingüística. [facility] [phone].    We comply with applicable federal civil rights laws and [place] laws. We do not discriminate on the basis of race, color, national origin, age, disability sex, sexual orientation or gender identity.            Thank you!     Thank you for choosing [facility] [place]  for your care. Our goal is always to provide you with excellent care. Hearing back from our patients is one way we can continue to improve our services. Please take a few minutes to complete the written survey that you may receive in the mail after your visit with us. Thank you!             Your Updated Medication List - Protect others around you: Learn how to safely use, store and throw away your medicines at www.disposemymeds.org.          This list is accurate as of: 9/21/17 11:20 AM.  Always use your most recent med list.                   Brand Name Dispense Instructions for use Diagnosis    acyclovir 400 MG tablet    ZOVIRAX    60 tablet    Take 1 tablet (400 mg) by mouth 2 times daily Viral Prophylaxis.    Multiple myeloma not having achieved remission (H)       ASPIRIN NOT PRESCRIBED    INTENTIONAL    0 each    Antiplatelet medication not prescribed intentionally due to Current anticoagulant therapy (warfarin/enoxaparin)    Paroxysmal atrial fibrillation (H)       CALCIUM CITRATE + PO      Take 2,000 mg by mouth daily 2 tabs        carboxymethylcellulose  0.5 % Soln ophthalmic solution    REFRESH PLUS     1 drop 4 times daily        CLARINEX PO      Take by mouth daily Taking claritin        COMPAZINE PO      Take 10 mg by mouth daily as needed        cycloSPORINE 0.05 % ophthalmic emulsion    RESTASIS     Place 1 drop into both eyes every 12 hours        DAILY MULTIVITAMIN PO      Take 1 tablet by mouth daily.    Routine general medical examination at a health care facility       * dexamethasone 4 MG tablet    DECADRON    28 tablet    Take 20mg (5 tablets) PO every week on the morning of velcade injection. Then take 1 tablet (4mg) by mouth for two days after darzalex.    Multiple myeloma not having achieved remission (H)       * dexamethasone 4 MG tablet    DECADRON    28 tablet    Take 20mg (5 tablets) by mouth every week on the morning of velcade injection.    Multiple myeloma not having achieved remission (H)       erythromycin ophthalmic ointment    ROMYCIN     Place 1 Application into both eyes At Bedtime        GENTLE STOOL SOFTENER PO      Take 100 mg by mouth daily        lidocaine-prilocaine cream    EMLA    30 g    Apply topically as needed for moderate pain Apply dollop size amount to port site 30-60 min prior to accessing    Multiple myeloma not having achieved remission (H)       LORazepam 0.5 MG tablet    ATIVAN    30 tablet    Take 1 tablet (0.5 mg) by mouth every 8 hours as needed for anxiety    Multiple myeloma not having achieved remission (H)       metoprolol 50 MG 24 hr tablet    TOPROL XL    270 tablet    Take 2 tablets (100mg) in the morning and 1 tablet (50mg) in the evening by mouth daily    Paroxysmal atrial fibrillation (H)       oxyCODONE 15 MG IR tablet    ROXICODONE    90 tablet    Take 1 tablet (15 mg) by mouth every 8 hours as needed for pain maximum 4 tablet(s) per day    Multiple myeloma not having achieved remission (H)       polyethylene glycol powder    MIRALAX/GLYCOLAX     Take 1 capful by mouth daily as needed    Bilateral leg  edema       timolol 0.25 % ophthalmic solution    TIMOPTIC     Place 1 drop into the right eye 2 times daily        TYLENOL PO      Take 500 mg by mouth every 6 hours as needed for mild pain or fever        UNABLE TO FIND      MEDICATION NAME: Fresh Coat eye drops        VITAMIN D3 PO      Take 1,000 Units by mouth daily        warfarin 4 MG tablet    COUMADIN    110 tablet    TAKE ONE AND ONE-HALF TABLETS BY MOUTH ON MONDAY, WEDNESDAY, AND FRIDAY AND ONE TABLET THE OTHER DAYS OF THE WEEK    Paroxysmal atrial fibrillation (H), Long-term (current) use of anticoagulants       ZOMETA IV      Inject into the vein every 30 days Every 3 month dosing        * Notice:  This list has 2 medication(s) that are the same as other medications prescribed for you. Read the directions carefully, and ask your doctor or other care provider to review them with you.

## 2017-09-21 NOTE — MR AVS SNAPSHOT
Amiragino Arreola   9/21/2017 9:45 AM   Anticoagulation Therapy Visit    Description:  85 year old female   Provider:   ANTICOAGULATION CLINIC   Department:  Ec Nurse           INR as of 9/21/2017     Today's INR 2.6      Anticoagulation Summary as of 9/21/2017     INR goal 2.0-3.0   Today's INR 2.6   Full instructions 6 mg on Mon, Fri; 4 mg all other days   Next INR check 10/6/2017    Indications   Long-term (current) use of anticoagulants [Z79.01] [Z79.01]  Atrial fibrillation (H) [I48.91] (Resolved) [I48.91]         Description     INR 2.6 today.  Continue with 6 mg on Mon, Fri;  4 mg all other days = 32 mg weekly.  Recheck in 2 weeks.       Your next Anticoagulation Clinic appointment(s)     Oct 06, 2017 10:00 AM CDT   Anticoagulation Visit with  ANTICOAGULATION CLINIC   [facility] ([facility])    [address] [place] [id number] [phone]              Contact Numbers     Clinic Number:         September 2017 Details    Sun Mon Tue Wed Thu Fri Sat          1               2                 3               4               5               6               7               8               9                 10               11               12               13               14               15               16                 17               18               19               20               21      4 mg   See details      22      6 mg         23      4 mg           24      4 mg         25      6 mg         26      4 mg         27      4 mg         28      4 mg         29      6 mg         30      4 mg          Date Details   09/21 This INR check               How to take your warfarin dose     To take:  4 mg Take 1 of the 4 mg tablets.    To take:  6 mg Take 1.5 of the 4 mg tablets.           October 2017 Details    Sun Mon Tue Wed Thu Fri Sat     1      4 mg         2      6 mg         3      4 mg         4      4 mg         5      4 mg          6            7                 8               9               10               11               12               13               14                 15               16               17               18               19               20               21                 22               23               24               25               26               27               28                 29               30               31                    Date Details   No additional details    Date of next INR:  10/6/2017         How to take your warfarin dose     To take:  4 mg Take 1 of the 4 mg tablets.    To take:  6 mg Take 1.5 of the 4 mg tablets.

## 2017-09-21 NOTE — PROGRESS NOTES
HPI and Plan:   See dictation(#[id number])    Orders Placed This Encounter   Procedures     Follow-Up with Cardiologist       No orders of the defined types were placed in this encounter.      Medications Discontinued During This Encounter   Medication Reason     metoprolol (TOPROL XL) 50 MG 24 hr tablet Stopped by Patient         Encounter Diagnosis   Name Primary?     Paroxysmal atrial fibrillation (H)        CURRENT MEDICATIONS:  Current Outpatient Prescriptions   Medication Sig Dispense Refill     dexamethasone (DECADRON) 4 MG tablet Take 20mg (5 tablets) by mouth every week on the morning of velcade injection. 28 tablet 0     oxyCODONE (ROXICODONE) 15 MG IR tablet Take 1 tablet (15 mg) by mouth every 8 hours as needed for pain maximum 4 tablet(s) per day 90 tablet 0     LORazepam (ATIVAN) 0.5 MG tablet Take 1 tablet (0.5 mg) by mouth every 8 hours as needed for anxiety 30 tablet 3     metoprolol (TOPROL XL) 50 MG 24 hr tablet Take 2 tablets (100mg) in the morning and 1 tablet (50mg) in the evening by mouth daily 270 tablet 1     warfarin (COUMADIN) 4 MG tablet TAKE ONE AND ONE-HALF TABLETS BY MOUTH ON MONDAY, WEDNESDAY, AND FRIDAY AND ONE TABLET THE OTHER DAYS OF THE WEEK 110 tablet 0     dexamethasone (DECADRON) 4 MG tablet Take 20mg (5 tablets) PO every week on the morning of velcade injection. Then take 1 tablet (4mg) by mouth for two days after darzalex. 28 tablet 0     lidocaine-prilocaine (EMLA) cream Apply topically as needed for moderate pain Apply dollop size amount to port site 30-60 min prior to accessing 30 g 1     Acetaminophen (TYLENOL PO) Take 500 mg by mouth every 6 hours as needed for mild pain or fever       acyclovir (ZOVIRAX) 400 MG tablet Take 1 tablet (400 mg) by mouth 2 times daily Viral Prophylaxis. 60 tablet 11     Zoledronic Acid (ZOMETA IV) Inject into the vein every 30 days Every 3 month dosing       Desloratadine (CLARINEX PO) Take by mouth daily Taking claritin       Prochlorperazine  Maleate (COMPAZINE PO) Take 10 mg by mouth daily as needed        ASPIRIN NOT PRESCRIBED (INTENTIONAL) Antiplatelet medication not prescribed intentionally due to Current anticoagulant therapy (warfarin/enoxaparin) 0 each 0     polyethylene glycol (MIRALAX/GLYCOLAX) powder Take 1 capful by mouth daily as needed        UNABLE TO FIND MEDICATION NAME: Fresh Coat eye drops       timolol (TIMOPTIC) 0.25 % ophthalmic solution Place 1 drop into the right eye 2 times daily        carboxymethylcellulose (REFRESH PLUS) 0.5 % SOLN 1 drop 4 times daily       Cholecalciferol (VITAMIN D3 PO) Take 1,000 Units by mouth daily       Calcium Citrate-Vitamin D (CALCIUM CITRATE + PO) Take 2,000 mg by mouth daily 2 tabs       erythromycin (ROMYCIN) ophthalmic ointment Place 1 Application into both eyes At Bedtime        cycloSPORINE (RESTASIS) 0.05 % ophthalmic emulsion Place 1 drop into both eyes every 12 hours        Docusate Sodium (GENTLE STOOL SOFTENER PO) Take 100 mg by mouth daily        Multiple Vitamin (DAILY MULTIVITAMIN PO) Take 1 tablet by mouth daily.       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 Roberts     Multiple myeloma (H) 2016    dx 5/16 at [facility], bone lesions seen on mri 6/16     OAB (overactive bladder) 2013    Dr. Grullon      Osteoporosis     fu done  and stable, went off meds then, fu done ; has had gyn fu and added evista  by gyn     Palpitations     nl echo, mildly dilated asc aorta     Paroxysmal atrial fibrillation (H)     had palp and ziopatch showed it, echo nl lv fxn, mild mr and tr, added coum and toprol, toprol dose raised 16     Sciatica of left side     Dr. Helen Ricardo      SVT (supraventricular tachycardia) (H)     on ziopatch     Thrombocytopenia (H)        PAST SURGICAL HISTORY:  Past Surgical History:   Procedure Laterality Date     BONE MARROW BIOPSY, BONE SPECIMEN, NEEDLE/TROCAR N/A 2016    Procedure: BIOPSY BONE MARROW;  Surgeon: Bryan Patel MD;  Location:  GI     CATARACT IOL, RT/LT        SECTION  ,      COLONOSCOPY  2013    Procedure: COLONOSCOPY;  COLONOSCOPY;  Surgeon: Steffany Rockwell MD;  Location:  GI     EXCISE EXOSTOSIS TIBIA / FIBULA  2014    Procedure: EXCISE EXOSTOSIS TIBIA / FIBULA;  Surgeon: Naila Pichardo MD;  Location:  [facility]     hysteroscopy and d and c      due to bleeding     left anle replacement       right ankle surgery         FAMILY HISTORY:  Family History   Problem Relation Age of Onset     HEART DISEASE Father      C.A.D. Mother      CEREBROVASCULAR DISEASE Brother      Family History Negative Sister      Family History Negative Sister      Family History Negative Brother        SOCIAL HISTORY:  Social History     Social History     Marital status:      Spouse name: Tom     Number of children: 6     Years of education: N/A     Occupational History     rn anesthetist Retired     Social History Main Topics     Smoking status: Never Smoker     Smokeless tobacco: Never Used     Alcohol use No     Drug use: No     Sexual activity: No     Other Topics Concern     None     Social History Narrative       Review of Systems:  Skin:  Positive for bruising    "  Eyes:  Positive for glasses    ENT:  Negative      Respiratory:  Negative       Cardiovascular:    Positive for;fatigue;dizziness;edema    Gastroenterology: Negative      Genitourinary:  Positive for urinary frequency    Musculoskeletal:  Negative      Neurologic:  Negative      Psychiatric:  Negative      Heme/Lymph/Imm:  Negative      Endocrine:  Negative        Physical Exam:  Vitals: /72  Pulse 76  Ht 1.676 m (5' 6\")  Wt 62.6 kg (138 lb)  BMI 22.27 kg/m2    Constitutional:           Skin:           Head:           Eyes:           ENT:           Neck:           Chest:             Cardiac:                    Abdomen:           Vascular:                                          Extremities and Back:                 Neurological:                 CC  Ramos Rivera MD  [address]  [place] [address]                  "

## 2017-09-21 NOTE — PROGRESS NOTES
ANTICOAGULATION FOLLOW-UP CLINIC VISIT    Patient Name:  Amira Arreola  Date:  9/21/2017  Contact Type:  Face to Face    SUBJECTIVE:     Patient Findings     Positives No Problem Findings           OBJECTIVE    INR Protime   Date Value Ref Range Status   09/21/2017 2.6 (A) 0.86 - 1.14 Final       ASSESSMENT / PLAN  INR assessment THER    Recheck INR In: 2 WEEKS    INR Location Clinic      Anticoagulation Summary as of 9/21/2017     INR goal 2.0-3.0   Today's INR 2.6   Maintenance plan 6 mg (4 mg x 1.5) on Mon, Fri; 4 mg (4 mg x 1) all other days   Full instructions 6 mg on Mon, Fri; 4 mg all other days   Weekly total 32 mg   No change documented Dayana Barrera RN   Plan last modified Dayana Barrera RN (9/14/2017)   Next INR check 10/6/2017   Target end date Indefinite    Indications   Long-term (current) use of anticoagulants [Z79.01] [Z79.01]  Atrial fibrillation (H) [I48.91] (Resolved) [I48.91]         Anticoagulation Episode Summary     INR check location     Preferred lab     Send INR reminders to  [facility]    Comments       Anticoagulation Care Providers     Provider Role Specialty Phone number    Addy Frias MD Responsible Internal Medicine [phone]            See the Encounter Report to view Anticoagulation Flowsheet and Dosing Calendar (Go to Encounters tab in chart review, and find the Anticoagulation Therapy Visit)    Dosage adjustment made based on physician directed care plan.    INR 2.6 today.  Continue with 6 mg on Mon, Fri;  4 mg all other days = 32 mg weekly.  Recheck in 2 weeks.     Dayana Barrera RN

## 2017-09-21 NOTE — LETTER
9/21/2017    Addy Frias MD  [address]  [facility] [address]    RE: Amira Arreola       Dear Colleague,    I had the pleasure of seeing Amira Arreola in the [facility].    REASON FOR CLINIC VISIT:  Followup atrial fibrillation.      Ms. Arreola is a very pleasant 85-year-old female with multiple myeloma on chemotherapy, atrial fibrillation and hypertension.  She is essentially asymptomatic from atrial fibrillation, and is on rate control strategy with beta blocker.  She is on Coumadin for stroke prophylaxis with [id number] score of 4.  Echocardiogram showed normal LVEF, 1-2+ mitral regurgitation, mild to moderate bilateral atrial enlargement and pulmonary hypertension with RVSP of 36 mmHg plus RA with mild aortic root and ascending aorta dilatation measuring 3.8 cm.  Today, she is coming for routine followup.  She is continuing chemotherapy and tolerating it reasonably well.  She continues to remain asymptomatic from an atrial fibrillation standpoint of view.  No palpitation.  No chest discomfort, shortness of breath, presyncope or syncope.  Ventricular rates appear well controlled, resting ventricular rates in 70s today.  Blood pressure is well controlled.  She is tolerating Coumadin quite well without any issues.  To be noted, we have discussed option of rate control versus rhythm control strategy extensively in the past and we also again discussed it today and in view of no mortality benefit of 1 strategy over the other and essentially asymptomatic status, we will continue rate control strategy as we are.        PHYSICAL EXAMINATION:   VITAL SIGNS:  Blood pressure 130/72, heart rate 76 and regular, weight 138 pounds, BMI 22.32.   GENERAL:  The patient appears pleasant, comfortable.   NECK:  Normal JVP, no bruit.   CARDIOVASCULAR SYSTEM:  Irregular, normal rate, no murmur, rub or gallop.   RESPIRATORY SYSTEM:  Clear to auscultation bilaterally.   ABDOMEN:  Soft,  nontender.   EXTREMITIES:  Minimal pitting pedal edema bilaterally.   HEENT:  No pallor or icterus.   PSYCHIATRIC:  Normal affect.   SKIN:  No obvious rash.   Outpatient Encounter Prescriptions as of 9/21/2017   Medication Sig Dispense Refill     dexamethasone (DECADRON) 4 MG tablet Take 20mg (5 tablets) by mouth every week on the morning of velcade injection. 28 tablet 0     LORazepam (ATIVAN) 0.5 MG tablet Take 1 tablet (0.5 mg) by mouth every 8 hours as needed for anxiety 30 tablet 3     [DISCONTINUED] oxyCODONE (ROXICODONE) 15 MG IR tablet Take 1 tablet (15 mg) by mouth every 8 hours as needed for pain maximum 4 tablet(s) per day 90 tablet 0     metoprolol (TOPROL XL) 50 MG 24 hr tablet Take 2 tablets (100mg) in the morning and 1 tablet (50mg) in the evening by mouth daily 270 tablet 1     warfarin (COUMADIN) 4 MG tablet TAKE ONE AND ONE-HALF TABLETS BY MOUTH ON MONDAY, WEDNESDAY, AND FRIDAY AND ONE TABLET THE OTHER DAYS OF THE WEEK 110 tablet 0     dexamethasone (DECADRON) 4 MG tablet Take 20mg (5 tablets) PO every week on the morning of velcade injection. Then take 1 tablet (4mg) by mouth for two days after darzalex. 28 tablet 0     lidocaine-prilocaine (EMLA) cream Apply topically as needed for moderate pain Apply dollop size amount to port site 30-60 min prior to accessing 30 g 1     Acetaminophen (TYLENOL PO) Take 500 mg by mouth every 6 hours as needed for mild pain or fever       acyclovir (ZOVIRAX) 400 MG tablet Take 1 tablet (400 mg) by mouth 2 times daily Viral Prophylaxis. 60 tablet 11     Zoledronic Acid (ZOMETA IV) Inject into the vein every 30 days Every 3 month dosing       Desloratadine (CLARINEX PO) Take by mouth daily Taking claritin       Prochlorperazine Maleate (COMPAZINE PO) Take 10 mg by mouth daily as needed        [DISCONTINUED] ASPIRIN NOT PRESCRIBED (INTENTIONAL) Antiplatelet medication not prescribed intentionally due to Current anticoagulant therapy (warfarin/enoxaparin) 0 each 0      polyethylene glycol (MIRALAX/GLYCOLAX) powder Take 1 capful by mouth daily as needed        UNABLE TO FIND MEDICATION NAME: Fresh Coat eye drops       timolol (TIMOPTIC) 0.25 % ophthalmic solution Place 1 drop into the right eye 2 times daily        carboxymethylcellulose (REFRESH PLUS) 0.5 % SOLN 1 drop 4 times daily       Cholecalciferol (VITAMIN D3 PO) Take 1,000 Units by mouth daily       Calcium Citrate-Vitamin D (CALCIUM CITRATE + PO) Take 2,000 mg by mouth daily 2 tabs       erythromycin (ROMYCIN) ophthalmic ointment Place 1 Application into both eyes At Bedtime        cycloSPORINE (RESTASIS) 0.05 % ophthalmic emulsion Place 1 drop into both eyes every 12 hours        Docusate Sodium (GENTLE STOOL SOFTENER PO) Take 100 mg by mouth daily        Multiple Vitamin (DAILY MULTIVITAMIN PO) Take 1 tablet by mouth daily.       [DISCONTINUED] metoprolol (TOPROL XL) 50 MG 24 hr tablet Take 100 mg by mouth daily (with breakfast)       No facility-administered encounter medications on file as of 9/21/2017.       IMPRESSION AND PLAN:  A very pleasant 85-year-old female with atrial fibrillation on rate control strategy with ventricular rates that appear quite well controlled, essentially asymptomatic from atrial fibrillation with a [id number] score of 4 on Coumadin for stroke prophylaxis, normal LV function, multiple myeloma on chemotherapy, and history of hypertension with blood pressure being well controlled.  Overall, cardiovascular status-wise, she appears stable.  If she continues to feel well cardiac wise, we can see her back in 6 months, sooner if any change in her clinical status.     Again, thank you for allowing me to participate in the care of your patient.      Sincerely,    Ramos Rivera MD     [facility], [place]

## 2017-09-27 ENCOUNTER — HOSPITAL ENCOUNTER (OUTPATIENT)
Facility: CLINIC | Age: 82
Setting detail: SPECIMEN
Discharge: HOME | End: 2017-09-27
Attending: INTERNAL MEDICINE | Admitting: INTERNAL MEDICINE
Payer: MEDICARE

## 2017-09-27 ENCOUNTER — HOSPITAL ENCOUNTER (EMERGENCY)
Facility: CLINIC | Age: 82
Discharge: HOME | End: 2017-09-27
Attending: EMERGENCY MEDICINE | Admitting: EMERGENCY MEDICINE
Payer: COMMERCIAL

## 2017-09-27 ENCOUNTER — APPOINTMENT (OUTPATIENT)
Dept: GENERAL RADIOLOGY | Facility: CLINIC | Age: 82
End: 2017-09-27
Attending: EMERGENCY MEDICINE
Payer: COMMERCIAL

## 2017-09-27 ENCOUNTER — INFUSION THERAPY VISIT (OUTPATIENT)
Dept: INFUSION THERAPY | Facility: CLINIC | Age: 82
End: 2017-09-27
Attending: INTERNAL MEDICINE
Payer: COMMERCIAL

## 2017-09-27 VITALS — DIASTOLIC BLOOD PRESSURE: 88 MMHG | SYSTOLIC BLOOD PRESSURE: 159 MMHG | TEMPERATURE: 97.8 F | RESPIRATION RATE: 16 BRPM

## 2017-09-27 VITALS
BODY MASS INDEX: 21.21 KG/M2 | WEIGHT: 132 LBS | OXYGEN SATURATION: 99 % | HEIGHT: 66 IN | TEMPERATURE: 97.6 F | DIASTOLIC BLOOD PRESSURE: 107 MMHG | RESPIRATION RATE: 14 BRPM | SYSTOLIC BLOOD PRESSURE: 127 MMHG

## 2017-09-27 DIAGNOSIS — C90.00 MULTIPLE MYELOMA NOT HAVING ACHIEVED REMISSION (H): Primary | ICD-10-CM
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
## 2017-09-27 DIAGNOSIS — S20.219A CHEST WALL CONTUSION, UNSPECIFIED LATERALITY, INITIAL ENCOUNTER: ICD-10-CM

## 2017-09-27 LAB
BASOPHILS # BLD AUTO: 0 10E9/L (ref 0–0.2)
BASOPHILS NFR BLD AUTO: 0 %
DIFFERENTIAL METHOD BLD: ABNORMAL
EOSINOPHIL # BLD AUTO: 0 10E9/L (ref 0–0.7)
EOSINOPHIL NFR BLD AUTO: 0 %
ERYTHROCYTE [DISTWIDTH] IN BLOOD BY AUTOMATED COUNT: 14.8 % (ref 10–15)
HCT VFR BLD AUTO: 36.2 % (ref 35–47)
HGB BLD-MCNC: 12.6 G/DL (ref 11.7–15.7)
IMM GRANULOCYTES # BLD: 0 10E9/L (ref 0–0.4)
IMM GRANULOCYTES NFR BLD: 0.4 %
LYMPHOCYTES # BLD AUTO: 0.4 10E9/L (ref 0.8–5.3)
LYMPHOCYTES NFR BLD AUTO: 4.4 %
MCH RBC QN AUTO: 34.6 PG (ref 26.5–33)
MCHC RBC AUTO-ENTMCNC: 34.8 G/DL (ref 31.5–36.5)
MCV RBC AUTO: 100 FL (ref 78–100)
MONOCYTES # BLD AUTO: 0.1 10E9/L (ref 0–1.3)
MONOCYTES NFR BLD AUTO: 1.6 %
NEUTROPHILS # BLD AUTO: 7.5 10E9/L (ref 1.6–8.3)
NEUTROPHILS NFR BLD AUTO: 93.6 %
NRBC # BLD AUTO: 0 10*3/UL
NRBC BLD AUTO-RTO: 0 /100
PLATELET # BLD AUTO: 132 10E9/L (ref 150–450)
RBC # BLD AUTO: 3.64 10E12/L (ref 3.8–5.2)
WBC # BLD AUTO: 8 10E9/L (ref 4–11)

## 2017-09-27 PROCEDURE — 25000128 H RX IP 250 OP 636: Performed by: INTERNAL MEDICINE

## 2017-09-27 PROCEDURE — 25000132 ZZH RX MED GY IP 250 OP 250 PS 637: Performed by: EMERGENCY MEDICINE

## 2017-09-27 PROCEDURE — 85025 COMPLETE CBC W/AUTO DIFF WBC: CPT | Performed by: INTERNAL MEDICINE

## 2017-09-27 PROCEDURE — 71020 XR CHEST 2 VW: CPT

## 2017-09-27 PROCEDURE — 99283 EMERGENCY DEPT VISIT LOW MDM: CPT | Mod: 25

## 2017-09-27 PROCEDURE — 96401 CHEMO ANTI-NEOPL SQ/IM: CPT

## 2017-09-27 RX ORDER — HEPARIN SODIUM (PORCINE) LOCK FLUSH IV SOLN 100 UNIT/ML 100 UNIT/ML
5 SOLUTION INTRAVENOUS EVERY 8 HOURS
Status: DISCONTINUED | OUTPATIENT
Start: 2017-09-27 | End: 2017-09-27 | Stop reason: HOSPADM

## 2017-09-27 RX ORDER — ACETAMINOPHEN 500 MG
1000 TABLET ORAL ONCE
Status: COMPLETED | OUTPATIENT
Start: 2017-09-27 | End: 2017-09-27

## 2017-09-27 RX ADMIN — ACETAMINOPHEN 1000 MG: 500 TABLET, FILM COATED ORAL at 09:58

## 2017-09-27 RX ADMIN — SODIUM CHLORIDE, PRESERVATIVE FREE 5 ML: 5 INJECTION INTRAVENOUS at 11:52

## 2017-09-27 RX ADMIN — BORTEZOMIB 2.2 MG: 3.5 INJECTION, POWDER, LYOPHILIZED, FOR SOLUTION INTRAVENOUS; SUBCUTANEOUS at 11:49

## 2017-09-27 NOTE — ED AVS SNAPSHOT
Emergency Department    [address] [facility] [address]    Phone:  [phone]    Fax:  [phone]                                       Amira Arreola   MRN: [id number]    Department:   Emergency Department   Date of Visit:  9/27/2017           After Visit Summary Signature Page     I have received my discharge instructions, and my questions have been answered. I have discussed any challenges I see with this plan with the nurse or doctor.    ..........................................................................................................................................  Patient/Patient Representative Signature      ..........................................................................................................................................  Patient Representative Print Name and Relationship to Patient    ..................................................               ................................................  Date                                            Time    ..........................................................................................................................................  Reviewed by Signature/Title    ...................................................              ..............................................  Date                                                            Time
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
## 2017-09-27 NOTE — MR AVS SNAPSHOT
After Visit Summary   9/27/2017    Amira Arreoal    MRN: [id number]           Patient Information     Date Of Birth          7/17/1932        Visit Information        Provider Department      9/27/2017 9:30 AM  INFUSION CHAIR 14 [facility]        Today's Diagnoses     Multiple myeloma not having achieved remission (H)    -  1       Follow-ups after your visit        Your next 10 appointments already scheduled     Oct 04, 2017  9:00 AM CDT   Level 4 with  INFUSION CHAIR 12   [facility] ([facility])    [facility]  [address]  [place] [address]   [phone]            Oct 06, 2017 10:00 AM CDT   Anticoagulation Visit with [facility]   [facility] ([address] [address]   [phone]            Oct 11, 2017 10:00 AM CDT   Level 2 with  INFUSION CHAIR 10   [facility] ([facility])    [facility]  [address]  [place] [address]   [phone]            Oct 11, 2017 10:30 AM CDT   Return Visit with Shayne Roberts MD   [facility] ([facility])    [facility]  [address]  [place] [address]   [phone]              Who to contact     If you have questions or need follow up information about today's clinic visit or your schedule please contact [facility] directly at [phone].  Normal or non-critical lab and imaging results will be communicated to you by MyChart, letter or phone within 4 business days after the clinic has received the results. If you do not hear from us within 7 days, please contact the clinic through MyChart or phone. If you have a critical or abnormal lab result, we will notify you  "by phone as soon as possible.  Submit refill requests through [facility] or call your pharmacy and they will forward the refill request to us. Please allow 3 business days for your refill to be completed.          Additional Information About Your Visit        LevelUpharAppifier Information     [facility] lets you send messages to your doctor, view your test results, renew your prescriptions, schedule appointments and more. To sign up, go to [email or web address] . Click on \"Log in\" on the left side of the screen, which will take you to the Welcome page. Then click on \"Sign up Now\" on the right side of the page.     You will be asked to enter the access code listed below, as well as some personal information. Please follow the directions to create your username and password.     Your access code is: [email or web address]  Expires: 2017 10:24 AM     Your access code will  in 90 days. If you need help or a new code, please call your [facility] clinic or [phone].        Care EveryWhere ID     This is your Care EveryWhere ID. This could be used by other organizations to access your [facility] medical records  [id number]        Your Vitals Were     Temperature Respirations                97.8  F (36.6  C) (Oral) 16           Blood Pressure from Last 3 Encounters:   17 (!) 127/107   17 159/88   17 130/72    Weight from Last 3 Encounters:   17 59.9 kg (132 lb)   17 62.6 kg (138 lb)   17 62.1 kg (136 lb 12.8 oz)              We Performed the Following     CBC with platelets differential        Primary Care Provider Office Phone # Fax #    Addy Frias [phone] [address]  [facility] [address]        Equal Access to Services     [facility] : Gissel Galloway, wajanetda luqadaha, qaybta kaalmada alonso, hung sadler. [facility] [phone].    ATENCIÓN: Si habla español, tiene a barlow disposición servicios gratuitos de " asistencia lingüística. Kadie al [phone].    We comply with applicable federal civil rights laws and [place] laws. We do not discriminate on the basis of race, color, national origin, age, disability sex, sexual orientation or gender identity.            Thank you!     Thank you for choosing [facility]  for your care. Our goal is always to provide you with excellent care. Hearing back from our patients is one way we can continue to improve our services. Please take a few minutes to complete the written survey that you may receive in the mail after your visit with us. Thank you!             Your Updated Medication List - Protect others around you: Learn how to safely use, store and throw away your medicines at www.disposemymeds.org.          This list is accurate as of: 9/27/17 12:32 PM.  Always use your most recent med list.                   Brand Name Dispense Instructions for use Diagnosis    acyclovir 400 MG tablet    ZOVIRAX    60 tablet    Take 1 tablet (400 mg) by mouth 2 times daily Viral Prophylaxis.    Multiple myeloma not having achieved remission (H)       ASPIRIN NOT PRESCRIBED    INTENTIONAL    0 each    Antiplatelet medication not prescribed intentionally due to Current anticoagulant therapy (warfarin/enoxaparin)    Paroxysmal atrial fibrillation (H)       CALCIUM CITRATE + PO      Take 2,000 mg by mouth daily 2 tabs        carboxymethylcellulose 0.5 % Soln ophthalmic solution    REFRESH PLUS     1 drop 4 times daily        CLARINEX PO      Take by mouth daily Taking claritin        COMPAZINE PO      Take 10 mg by mouth daily as needed        cycloSPORINE 0.05 % ophthalmic emulsion    RESTASIS     Place 1 drop into both eyes every 12 hours        DAILY MULTIVITAMIN PO      Take 1 tablet by mouth daily.    Routine general medical examination at a health care facility       * dexamethasone 4 MG tablet    DECADRON    28 tablet    Take 20mg (5 tablets) PO every week  on the morning of velcade injection. Then take 1 tablet (4mg) by mouth for two days after darzalex.    Multiple myeloma not having achieved remission (H)       * dexamethasone 4 MG tablet    DECADRON    28 tablet    Take 20mg (5 tablets) by mouth every week on the morning of velcade injection.    Multiple myeloma not having achieved remission (H)       erythromycin ophthalmic ointment    ROMYCIN     Place 1 Application into both eyes At Bedtime        GENTLE STOOL SOFTENER PO      Take 100 mg by mouth daily        lidocaine-prilocaine cream    EMLA    30 g    Apply topically as needed for moderate pain Apply dollop size amount to port site 30-60 min prior to accessing    Multiple myeloma not having achieved remission (H)       LORazepam 0.5 MG tablet    ATIVAN    30 tablet    Take 1 tablet (0.5 mg) by mouth every 8 hours as needed for anxiety    Multiple myeloma not having achieved remission (H)       metoprolol 50 MG 24 hr tablet    TOPROL XL    270 tablet    Take 2 tablets (100mg) in the morning and 1 tablet (50mg) in the evening by mouth daily    Paroxysmal atrial fibrillation (H)       oxyCODONE 15 MG IR tablet    ROXICODONE    90 tablet    Take 1 tablet (15 mg) by mouth every 8 hours as needed for pain maximum 4 tablet(s) per day    Multiple myeloma not having achieved remission (H)       polyethylene glycol powder    MIRALAX/GLYCOLAX     Take 1 capful by mouth daily as needed    Bilateral leg edema       timolol 0.25 % ophthalmic solution    TIMOPTIC     Place 1 drop into the right eye 2 times daily        TYLENOL PO      Take 500 mg by mouth every 6 hours as needed for mild pain or fever        UNABLE TO FIND      MEDICATION NAME: Fresh Coat eye drops        VITAMIN D3 PO      Take 1,000 Units by mouth daily        warfarin 4 MG tablet    COUMADIN    110 tablet    TAKE ONE AND ONE-HALF TABLETS BY MOUTH ON MONDAY, WEDNESDAY, AND FRIDAY AND ONE TABLET THE OTHER DAYS OF THE WEEK    Paroxysmal atrial fibrillation  (H), Long-term (current) use of anticoagulants       ZOMETA IV      Inject into the vein every 30 days Every 3 month dosing        * Notice:  This list has 2 medication(s) that are the same as other medications prescribed for you. Read the directions carefully, and ask your doctor or other care provider to review them with you.
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
## 2017-10-02 ENCOUNTER — OFFICE VISIT (OUTPATIENT)
Dept: FAMILY MEDICINE | Facility: CLINIC | Age: 82
End: 2017-10-02
Payer: COMMERCIAL

## 2017-10-02 VITALS
TEMPERATURE: 97.2 F | WEIGHT: 132 LBS | HEART RATE: 90 BPM | SYSTOLIC BLOOD PRESSURE: 118 MMHG | HEIGHT: 66 IN | BODY MASS INDEX: 21.21 KG/M2 | OXYGEN SATURATION: 98 % | DIASTOLIC BLOOD PRESSURE: 66 MMHG

## 2017-10-02 DIAGNOSIS — S20.219D CONTUSION OF CHEST WALL, UNSPECIFIED LATERALITY, SUBSEQUENT ENCOUNTER: ICD-10-CM

## 2017-10-02 DIAGNOSIS — I48.0 PAROXYSMAL ATRIAL FIBRILLATION (H): ICD-10-CM

## 2017-10-02 DIAGNOSIS — C90.00 MULTIPLE MYELOMA NOT HAVING ACHIEVED REMISSION (H): ICD-10-CM

## 2017-10-02 DIAGNOSIS — D69.6 THROMBOCYTOPENIA (H): ICD-10-CM
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
## 2017-10-02 PROCEDURE — 99213 OFFICE O/P EST LOW 20 MIN: CPT | Performed by: INTERNAL MEDICINE
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
## 2017-10-02 NOTE — NURSING NOTE
"Chief Complaint   Patient presents with     Hospital F/U       Initial /82  Pulse 90  Temp 97.2  F (36.2  C) (Oral)  Ht 5' 6\" (1.676 m)  Wt 132 lb (59.9 kg)  SpO2 98%  Breastfeeding? No  BMI 21.31 kg/m2 Estimated body mass index is 21.31 kg/(m^2) as calculated from the following:    Height as of this encounter: 5' 6\" (1.676 m).    Weight as of this encounter: 132 lb (59.9 kg).  Medication Reconciliation: complete   Esperanza ROBERSON CMA      "
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
## 2017-10-04 ENCOUNTER — INFUSION THERAPY VISIT (OUTPATIENT)
Dept: INFUSION THERAPY | Facility: CLINIC | Age: 82
End: 2017-10-04
Attending: INTERNAL MEDICINE
Payer: MEDICARE

## 2017-10-04 ENCOUNTER — HOSPITAL ENCOUNTER (OUTPATIENT)
Facility: CLINIC | Age: 82
Setting detail: SPECIMEN
Discharge: HOME | End: 2017-10-04
Attending: INTERNAL MEDICINE | Admitting: INTERNAL MEDICINE
Payer: MEDICARE

## 2017-10-04 VITALS
DIASTOLIC BLOOD PRESSURE: 64 MMHG | RESPIRATION RATE: 16 BRPM | HEART RATE: 83 BPM | OXYGEN SATURATION: 100 % | SYSTOLIC BLOOD PRESSURE: 113 MMHG | TEMPERATURE: 98.4 F | BODY MASS INDEX: 22.69 KG/M2 | HEIGHT: 66 IN | WEIGHT: 141.2 LBS

## 2017-10-04 DIAGNOSIS — C90.00 MULTIPLE MYELOMA NOT HAVING ACHIEVED REMISSION (H): Primary | ICD-10-CM

## 2017-10-04 LAB
BASOPHILS # BLD AUTO: 0 10E9/L (ref 0–0.2)
BASOPHILS NFR BLD AUTO: 0 %
DIFFERENTIAL METHOD BLD: ABNORMAL
EOSINOPHIL # BLD AUTO: 0 10E9/L (ref 0–0.7)
EOSINOPHIL NFR BLD AUTO: 0 %
ERYTHROCYTE [DISTWIDTH] IN BLOOD BY AUTOMATED COUNT: 14.8 % (ref 10–15)
HCT VFR BLD AUTO: 34.2 % (ref 35–47)
HGB BLD-MCNC: 11.8 G/DL (ref 11.7–15.7)
IMM GRANULOCYTES # BLD: 0 10E9/L (ref 0–0.4)
IMM GRANULOCYTES NFR BLD: 0.3 %
LYMPHOCYTES # BLD AUTO: 0.3 10E9/L (ref 0.8–5.3)
LYMPHOCYTES NFR BLD AUTO: 4.1 %
MCH RBC QN AUTO: 34.5 PG (ref 26.5–33)
MCHC RBC AUTO-ENTMCNC: 34.5 G/DL (ref 31.5–36.5)
MCV RBC AUTO: 100 FL (ref 78–100)
MONOCYTES # BLD AUTO: 0.1 10E9/L (ref 0–1.3)
MONOCYTES NFR BLD AUTO: 1.9 %
NEUTROPHILS # BLD AUTO: 6.9 10E9/L (ref 1.6–8.3)
NEUTROPHILS NFR BLD AUTO: 93.7 %
NRBC # BLD AUTO: 0 10*3/UL
NRBC BLD AUTO-RTO: 0 /100
PLATELET # BLD AUTO: 132 10E9/L (ref 150–450)
RBC # BLD AUTO: 3.42 10E12/L (ref 3.8–5.2)
WBC # BLD AUTO: 7.3 10E9/L (ref 4–11)

## 2017-10-04 PROCEDURE — 96413 CHEMO IV INFUSION 1 HR: CPT

## 2017-10-04 PROCEDURE — A9270 NON-COVERED ITEM OR SERVICE: HCPCS | Mod: GY | Performed by: INTERNAL MEDICINE

## 2017-10-04 PROCEDURE — 25000132 ZZH RX MED GY IP 250 OP 250 PS 637: Mod: GY | Performed by: INTERNAL MEDICINE

## 2017-10-04 PROCEDURE — 96367 TX/PROPH/DG ADDL SEQ IV INF: CPT

## 2017-10-04 PROCEDURE — 85025 COMPLETE CBC W/AUTO DIFF WBC: CPT | Performed by: INTERNAL MEDICINE

## 2017-10-04 PROCEDURE — 96375 TX/PRO/DX INJ NEW DRUG ADDON: CPT

## 2017-10-04 PROCEDURE — 25000128 H RX IP 250 OP 636: Performed by: INTERNAL MEDICINE

## 2017-10-04 PROCEDURE — 96415 CHEMO IV INFUSION ADDL HR: CPT

## 2017-10-04 PROCEDURE — 96401 CHEMO ANTI-NEOPL SQ/IM: CPT

## 2017-10-04 RX ORDER — HEPARIN SODIUM (PORCINE) LOCK FLUSH IV SOLN 100 UNIT/ML 100 UNIT/ML
5 SOLUTION INTRAVENOUS EVERY 8 HOURS
Status: DISCONTINUED | OUTPATIENT
Start: 2017-10-04 | End: 2017-10-04 | Stop reason: HOSPADM

## 2017-10-04 RX ORDER — ACETAMINOPHEN 325 MG/1
650 TABLET ORAL ONCE
Status: COMPLETED | OUTPATIENT
Start: 2017-10-04 | End: 2017-10-04

## 2017-10-04 RX ADMIN — DEXAMETHASONE SODIUM PHOSPHATE 12 MG: 10 INJECTION, SOLUTION INTRAMUSCULAR; INTRAVENOUS at 09:56

## 2017-10-04 RX ADMIN — BORTEZOMIB 2.2 MG: 3.5 INJECTION, POWDER, LYOPHILIZED, FOR SOLUTION INTRAVENOUS; SUBCUTANEOUS at 10:26

## 2017-10-04 RX ADMIN — SODIUM CHLORIDE 250 ML: 9 INJECTION, SOLUTION INTRAVENOUS at 09:56

## 2017-10-04 RX ADMIN — DIPHENHYDRAMINE HYDROCHLORIDE 50 MG: 50 INJECTION, SOLUTION INTRAMUSCULAR; INTRAVENOUS at 10:15

## 2017-10-04 RX ADMIN — ACETAMINOPHEN 650 MG: 325 TABLET ORAL at 09:56

## 2017-10-04 RX ADMIN — SODIUM CHLORIDE, PRESERVATIVE FREE 5 ML: 5 INJECTION INTRAVENOUS at 13:48

## 2017-10-04 RX ADMIN — DARATUMUMAB 1000 MG: 100 INJECTION, SOLUTION, CONCENTRATE INTRAVENOUS at 10:29

## 2017-10-04 ASSESSMENT — PAIN SCALES - GENERAL: PAINLEVEL: NO PAIN (0)

## 2017-10-04 NOTE — MR AVS SNAPSHOT
After Visit Summary   10/4/2017    Amira Arreola    MRN: [id number]           Patient Information     Date Of Birth          7/17/1932        Visit Information        Provider Department      10/4/2017 9:00 AM  INFUSION CHAIR 12 [facility]        Today's Diagnoses     Multiple myeloma not having achieved remission (H)    -  1       Follow-ups after your visit        Your next 10 appointments already scheduled     Oct 06, 2017 10:00 AM CDT   Anticoagulation Visit with EC ANTICOAGULATION CLINIC   [facility] [place] [place] ([address] [address]   [phone]            Oct 11, 2017 10:00 AM CDT   Level 2 with  INFUSION CHAIR 10   [facility] ([facility])    [facility]  [address]  [facility] [address]   [phone]            Oct 11, 2017 10:30 AM CDT   Return Visit with Shayne Roberts MD   [facility] ([facility])    [facility]  [address]  [facility] [address] [phone]              Who to contact     If you have questions or need follow up information about today's clinic visit or your schedule please contact [facility] directly at [phone].  Normal or non-critical lab and imaging results will be communicated to you by MyChart, letter or phone within 4 business days after the clinic has received the results. If you do not hear from us within 7 days, please contact the clinic through MyChart or phone. If you have a critical or abnormal lab result, we will notify you by phone as soon as possible.  Submit refill requests through [employer] or call your pharmacy and they will forward the refill request to us. Please allow 3 business days for your refill to be completed.          Additional Information About Your  "Visit        [employer] Information     [employer] lets you send messages to your doctor, view your test results, renew your prescriptions, schedule appointments and more. To sign up, go to [email or web address] . Click on \"Log in\" on the left side of the screen, which will take you to the Welcome page. Then click on \"Sign up Now\" on the right side of the page.     You will be asked to enter the access code listed below, as well as some personal information. Please follow the directions to create your username and password.     Your access code is: [email or web address]  Expires: 2017 10:24 AM     Your access code will  in 90 days. If you need help or a new code, please call your [place] clinic or [phone].        Care EveryWhere ID     This is your Care EveryWhere ID. This could be used by other organizations to access your [place] medical records  [id number]        Your Vitals Were     Pulse Temperature Respirations Height Pulse Oximetry BMI (Body Mass Index)    83 98.4  F (36.9  C) (Oral) 16 1.676 m (5' 5.98\") 100% 22.8 kg/m2       Blood Pressure from Last 3 Encounters:   10/04/17 113/64   10/02/17 118/66   17 (!) 127/107    Weight from Last 3 Encounters:   10/04/17 64 kg (141 lb 3.2 oz)   10/02/17 59.9 kg (132 lb)   17 59.9 kg (132 lb)              We Performed the Following     CBC with platelets differential        Primary Care Provider Office Phone # Fax #    Addy Frias [phone] [address]  [place] [address]        Equal Access to Services     SARA ZELAYA : Gissel Galloway, rhonda gutierrez, dahlia gupta, hung sadler. So [facility] [phone].    ATENCIÓN: Si habla español, tiene a barlow disposición servicios gratuitos de asistencia lingüística. Kadie al [phone].    We comply with applicable federal civil rights laws and [place] laws. We do not discriminate on the basis of race, color, " national origin, age, disability, sex, sexual orientation, or gender identity.            Thank you!     Thank you for choosing [facility] AND [facility]  for your care. Our goal is always to provide you with excellent care. Hearing back from our patients is one way we can continue to improve our services. Please take a few minutes to complete the written survey that you may receive in the mail after your visit with us. Thank you!             Your Updated Medication List - Protect others around you: Learn how to safely use, store and throw away your medicines at www.disposemymeds.org.          This list is accurate as of: 10/4/17  1:54 PM.  Always use your most recent med list.                   Brand Name Dispense Instructions for use Diagnosis    acyclovir 400 MG tablet    ZOVIRAX    60 tablet    Take 1 tablet (400 mg) by mouth 2 times daily Viral Prophylaxis.    Multiple myeloma not having achieved remission (H)       ASPIRIN NOT PRESCRIBED    INTENTIONAL    0 each    Antiplatelet medication not prescribed intentionally due to Current anticoagulant therapy (warfarin/enoxaparin)    Paroxysmal atrial fibrillation (H)       CALCIUM CITRATE + PO      Take 2,000 mg by mouth daily 2 tabs        carboxymethylcellulose 0.5 % Soln ophthalmic solution    REFRESH PLUS     1 drop 4 times daily        CLARINEX PO      Take by mouth daily Taking claritin        COMPAZINE PO      Take 10 mg by mouth daily as needed        cycloSPORINE 0.05 % ophthalmic emulsion    RESTASIS     Place 1 drop into both eyes every 12 hours        DAILY MULTIVITAMIN PO      Take 1 tablet by mouth daily.    Routine general medical examination at a health care facility       * dexamethasone 4 MG tablet    DECADRON    28 tablet    Take 20mg (5 tablets) PO every week on the morning of velcade injection. Then take 1 tablet (4mg) by mouth for two days after darzalex.    Multiple myeloma not having achieved remission (H)       *  dexamethasone 4 MG tablet    DECADRON    28 tablet    Take 20mg (5 tablets) by mouth every week on the morning of velcade injection.    Multiple myeloma not having achieved remission (H)       erythromycin ophthalmic ointment    ROMYCIN     Place 1 Application into both eyes At Bedtime        GENTLE STOOL SOFTENER PO      Take 100 mg by mouth daily        lidocaine-prilocaine cream    EMLA    30 g    Apply topically as needed for moderate pain Apply dollop size amount to port site 30-60 min prior to accessing    Multiple myeloma not having achieved remission (H)       LORazepam 0.5 MG tablet    ATIVAN    30 tablet    Take 1 tablet (0.5 mg) by mouth every 8 hours as needed for anxiety    Multiple myeloma not having achieved remission (H)       metoprolol 50 MG 24 hr tablet    TOPROL XL    270 tablet    Take 2 tablets (100mg) in the morning and 1 tablet (50mg) in the evening by mouth daily    Paroxysmal atrial fibrillation (H)       oxyCODONE 15 MG IR tablet    ROXICODONE    90 tablet    Take 1 tablet (15 mg) by mouth every 8 hours as needed for pain maximum 4 tablet(s) per day    Multiple myeloma not having achieved remission (H)       polyethylene glycol powder    MIRALAX/GLYCOLAX     Take 1 capful by mouth daily as needed    Bilateral leg edema       timolol 0.25 % ophthalmic solution    TIMOPTIC     Place 1 drop into the right eye 2 times daily        TYLENOL PO      Take 500 mg by mouth every 6 hours as needed for mild pain or fever        UNABLE TO FIND      MEDICATION NAME: Fresh Coat eye drops        VITAMIN D3 PO      Take 1,000 Units by mouth daily        warfarin 4 MG tablet    COUMADIN    110 tablet    TAKE ONE AND ONE-HALF TABLETS BY MOUTH ON MONDAY, WEDNESDAY, AND FRIDAY AND ONE TABLET THE OTHER DAYS OF THE WEEK    Paroxysmal atrial fibrillation (H), Long-term (current) use of anticoagulants       ZOMETA IV      Inject into the vein every 30 days Every 3 month dosing        * Notice:  This list has 2  medication(s) that are the same as other medications prescribed for you. Read the directions carefully, and ask your doctor or other care provider to review them with you.

## 2017-10-04 NOTE — PROGRESS NOTES
Infusion Nursing Note:  Amira Arreola presents today for C5D15 Darzalex/Velcade .    Patient seen by provider today: No   present during visit today: Not Applicable.    Note: N/A.    Intravenous Access:  Labs drawn without difficulty.  Implanted Port.    Treatment Conditions:  Lab Results   Component Value Date    HGB 11.8 10/04/2017     Lab Results   Component Value Date    WBC 7.3 10/04/2017      Lab Results   Component Value Date    ANEU 6.9 10/04/2017     Lab Results   Component Value Date     10/04/2017      Results reviewed, labs MET treatment parameters, ok to proceed with treatment.        Post Infusion Assessment:  Patient tolerated infusion without incident.  Blood return noted pre and post infusion.  Site patent and intact, free from redness, edema or discomfort.  No evidence of extravasations.  Access discontinued per protocol.    Discharge Plan:   Discharge instructions reviewed with: Patient.  Patient and/or family verbalized understanding of discharge instructions and all questions answered.  Copy of AVS reviewed with patient and/or family.  Patient will return 10/11/17 for next appointment.  Patient discharged in stable condition accompanied by: daughter.  Departure Mode: Ambulatory.    David Kearney RN

## 2017-10-09 ENCOUNTER — ANTICOAGULATION THERAPY VISIT (OUTPATIENT)
Dept: NURSING | Facility: CLINIC | Age: 82
End: 2017-10-09
Payer: COMMERCIAL

## 2017-10-09 DIAGNOSIS — Z79.01 LONG-TERM (CURRENT) USE OF ANTICOAGULANTS: ICD-10-CM

## 2017-10-09 LAB — INR POINT OF CARE: 2.4 (ref 0.86–1.14)

## 2017-10-09 PROCEDURE — 99207 ZZC NO CHARGE NURSE ONLY: CPT

## 2017-10-09 PROCEDURE — 85610 PROTHROMBIN TIME: CPT | Mod: QW

## 2017-10-09 PROCEDURE — 36416 COLLJ CAPILLARY BLOOD SPEC: CPT

## 2017-10-09 NOTE — MR AVS SNAPSHOT
Amira Arreola   10/9/2017 10:00 AM   Anticoagulation Therapy Visit    Description:  85 year old female   Provider:  [facility] ANTICOAGULATION CLINIC   Department:  Ec Nurse           INR as of 10/9/2017     Today's INR 2.4      Anticoagulation Summary as of 10/9/2017     INR goal 2.0-3.0   Today's INR 2.4   Full instructions 6 mg on Mon, Fri; 4 mg all other days   Next INR check 10/16/2017    Indications   Long-term (current) use of anticoagulants [Z79.01] [Z79.01]  Atrial fibrillation (H) [I48.91] (Resolved) [I48.91]         Your next Anticoagulation Clinic appointment(s)     Oct 16, 2017 10:30 AM CDT   Anticoagulation Visit with  ANTICOAGULATION CLINIC   [facility] ([address] [address]   [phone]              Contact Numbers     Clinic Number:         October 2017 Details    Sun Mon Tue Wed Thu Fri Sat     1               2               3               4               5               6               7                 8               9      6 mg   See details      10      4 mg         11      4 mg         12      4 mg         13      6 mg         14      4 mg           15      4 mg         16            17               18               19               20               21                 22               23               24               25               26               27               28                 29               30               31                    Date Details   10/09 This INR check       Date of next INR:  10/16/2017         How to take your warfarin dose     To take:  4 mg Take 1 of the 4 mg tablets.    To take:  6 mg Take 1.5 of the 4 mg tablets.
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
## 2017-10-11 ENCOUNTER — HOSPITAL ENCOUNTER (OUTPATIENT)
Facility: CLINIC | Age: 82
Setting detail: SPECIMEN
Discharge: HOME | End: 2017-10-11
Attending: INTERNAL MEDICINE | Admitting: INTERNAL MEDICINE
Payer: MEDICARE

## 2017-10-11 ENCOUNTER — INFUSION THERAPY VISIT (OUTPATIENT)
Dept: INFUSION THERAPY | Facility: CLINIC | Age: 82
End: 2017-10-11
Attending: INTERNAL MEDICINE
Payer: MEDICARE

## 2017-10-11 ENCOUNTER — ONCOLOGY VISIT (OUTPATIENT)
Dept: ONCOLOGY | Facility: CLINIC | Age: 82
End: 2017-10-11
Attending: INTERNAL MEDICINE
Payer: MEDICARE

## 2017-10-11 VITALS
OXYGEN SATURATION: 98 % | HEIGHT: 66 IN | SYSTOLIC BLOOD PRESSURE: 144 MMHG | DIASTOLIC BLOOD PRESSURE: 86 MMHG | WEIGHT: 138 LBS | TEMPERATURE: 97.6 F | BODY MASS INDEX: 22.18 KG/M2 | RESPIRATION RATE: 16 BRPM

## 2017-10-11 VITALS
DIASTOLIC BLOOD PRESSURE: 86 MMHG | BODY MASS INDEX: 22.28 KG/M2 | SYSTOLIC BLOOD PRESSURE: 144 MMHG | TEMPERATURE: 97.6 F | RESPIRATION RATE: 16 BRPM | WEIGHT: 138 LBS | OXYGEN SATURATION: 98 %

## 2017-10-11 DIAGNOSIS — C90.00 MULTIPLE MYELOMA NOT HAVING ACHIEVED REMISSION (H): Primary | ICD-10-CM

## 2017-10-11 DIAGNOSIS — Z95.828 PORTACATH IN PLACE: ICD-10-CM

## 2017-10-11 LAB
BASOPHILS # BLD AUTO: 0 10E9/L (ref 0–0.2)
BASOPHILS NFR BLD AUTO: 0 %
DIFFERENTIAL METHOD BLD: ABNORMAL
EOSINOPHIL # BLD AUTO: 0 10E9/L (ref 0–0.7)
EOSINOPHIL NFR BLD AUTO: 0.2 %
ERYTHROCYTE [DISTWIDTH] IN BLOOD BY AUTOMATED COUNT: 14.9 % (ref 10–15)
HCT VFR BLD AUTO: 35.3 % (ref 35–47)
HGB BLD-MCNC: 12.1 G/DL (ref 11.7–15.7)
IMM GRANULOCYTES # BLD: 0 10E9/L (ref 0–0.4)
IMM GRANULOCYTES NFR BLD: 0 %
LYMPHOCYTES # BLD AUTO: 0.3 10E9/L (ref 0.8–5.3)
LYMPHOCYTES NFR BLD AUTO: 5.1 %
MCH RBC QN AUTO: 34.5 PG (ref 26.5–33)
MCHC RBC AUTO-ENTMCNC: 34.3 G/DL (ref 31.5–36.5)
MCV RBC AUTO: 101 FL (ref 78–100)
MONOCYTES # BLD AUTO: 0.2 10E9/L (ref 0–1.3)
MONOCYTES NFR BLD AUTO: 2.3 %
NEUTROPHILS # BLD AUTO: 6 10E9/L (ref 1.6–8.3)
NEUTROPHILS NFR BLD AUTO: 92.4 %
NRBC # BLD AUTO: 0 10*3/UL
NRBC BLD AUTO-RTO: 0 /100
PLATELET # BLD AUTO: 161 10E9/L (ref 150–450)
RBC # BLD AUTO: 3.51 10E12/L (ref 3.8–5.2)
WBC # BLD AUTO: 6.5 10E9/L (ref 4–11)

## 2017-10-11 PROCEDURE — 85025 COMPLETE CBC W/AUTO DIFF WBC: CPT | Performed by: INTERNAL MEDICINE

## 2017-10-11 PROCEDURE — 99211 OFF/OP EST MAY X REQ PHY/QHP: CPT | Mod: 25

## 2017-10-11 PROCEDURE — 25000128 H RX IP 250 OP 636: Performed by: INTERNAL MEDICINE

## 2017-10-11 PROCEDURE — 99214 OFFICE O/P EST MOD 30 MIN: CPT | Performed by: INTERNAL MEDICINE

## 2017-10-11 PROCEDURE — 96401 CHEMO ANTI-NEOPL SQ/IM: CPT

## 2017-10-11 RX ORDER — HEPARIN SODIUM (PORCINE) LOCK FLUSH IV SOLN 100 UNIT/ML 100 UNIT/ML
5 SOLUTION INTRAVENOUS EVERY 8 HOURS
Status: CANCELLED
Start: 2017-11-08

## 2017-10-11 RX ORDER — DIPHENHYDRAMINE HYDROCHLORIDE 50 MG/ML
50 INJECTION INTRAMUSCULAR; INTRAVENOUS
Status: CANCELLED
Start: 2017-11-01

## 2017-10-11 RX ORDER — ALBUTEROL SULFATE 90 UG/1
1-2 AEROSOL, METERED RESPIRATORY (INHALATION)
Status: CANCELLED
Start: 2017-10-18

## 2017-10-11 RX ORDER — EPINEPHRINE 1 MG/ML
0.3 INJECTION, SOLUTION INTRAMUSCULAR; SUBCUTANEOUS EVERY 5 MIN PRN
Status: CANCELLED | OUTPATIENT
Start: 2017-11-01

## 2017-10-11 RX ORDER — EPINEPHRINE 1 MG/ML
0.3 INJECTION, SOLUTION INTRAMUSCULAR; SUBCUTANEOUS EVERY 5 MIN PRN
Status: CANCELLED | OUTPATIENT
Start: 2017-11-08

## 2017-10-11 RX ORDER — DIPHENHYDRAMINE HYDROCHLORIDE 50 MG/ML
50 INJECTION INTRAMUSCULAR; INTRAVENOUS
Status: CANCELLED
Start: 2017-10-18

## 2017-10-11 RX ORDER — DIPHENHYDRAMINE HYDROCHLORIDE 50 MG/ML
50 INJECTION INTRAMUSCULAR; INTRAVENOUS
Status: CANCELLED
Start: 2017-10-25

## 2017-10-11 RX ORDER — METHYLPREDNISOLONE SODIUM SUCCINATE 125 MG/2ML
125 INJECTION, POWDER, LYOPHILIZED, FOR SOLUTION INTRAMUSCULAR; INTRAVENOUS
Status: CANCELLED
Start: 2017-11-01

## 2017-10-11 RX ORDER — ALBUTEROL SULFATE 0.83 MG/ML
2.5 SOLUTION RESPIRATORY (INHALATION)
Status: CANCELLED | OUTPATIENT
Start: 2017-11-01

## 2017-10-11 RX ORDER — HEPARIN SODIUM (PORCINE) LOCK FLUSH IV SOLN 100 UNIT/ML 100 UNIT/ML
5 SOLUTION INTRAVENOUS EVERY 8 HOURS
Status: CANCELLED
Start: 2017-10-25

## 2017-10-11 RX ORDER — EPINEPHRINE 0.3 MG/.3ML
0.3 INJECTION SUBCUTANEOUS EVERY 5 MIN PRN
Status: CANCELLED | OUTPATIENT
Start: 2017-10-18

## 2017-10-11 RX ORDER — METHYLPREDNISOLONE SODIUM SUCCINATE 125 MG/2ML
125 INJECTION, POWDER, LYOPHILIZED, FOR SOLUTION INTRAMUSCULAR; INTRAVENOUS
Status: CANCELLED
Start: 2017-10-18

## 2017-10-11 RX ORDER — DIPHENHYDRAMINE HCL 25 MG
50 CAPSULE ORAL ONCE
Status: CANCELLED | OUTPATIENT
Start: 2017-10-18

## 2017-10-11 RX ORDER — ALBUTEROL SULFATE 0.83 MG/ML
2.5 SOLUTION RESPIRATORY (INHALATION)
Status: CANCELLED | OUTPATIENT
Start: 2017-10-25

## 2017-10-11 RX ORDER — ACETAMINOPHEN 325 MG/1
650 TABLET ORAL ONCE
Status: CANCELLED | OUTPATIENT
Start: 2017-11-01

## 2017-10-11 RX ORDER — ALBUTEROL SULFATE 90 UG/1
1-2 AEROSOL, METERED RESPIRATORY (INHALATION)
Status: CANCELLED
Start: 2017-11-01

## 2017-10-11 RX ORDER — LORAZEPAM 2 MG/ML
0.5 INJECTION INTRAMUSCULAR EVERY 4 HOURS PRN
Status: CANCELLED
Start: 2017-10-25

## 2017-10-11 RX ORDER — ALBUTEROL SULFATE 90 UG/1
1-2 AEROSOL, METERED RESPIRATORY (INHALATION)
Status: CANCELLED
Start: 2017-11-08

## 2017-10-11 RX ORDER — SODIUM CHLORIDE 9 MG/ML
1000 INJECTION, SOLUTION INTRAVENOUS CONTINUOUS PRN
Status: CANCELLED
Start: 2017-11-08

## 2017-10-11 RX ORDER — ALBUTEROL SULFATE 90 UG/1
1-2 AEROSOL, METERED RESPIRATORY (INHALATION)
Status: CANCELLED
Start: 2017-10-25

## 2017-10-11 RX ORDER — HEPARIN SODIUM (PORCINE) LOCK FLUSH IV SOLN 100 UNIT/ML 100 UNIT/ML
500 SOLUTION INTRAVENOUS EVERY 8 HOURS
Status: DISCONTINUED | OUTPATIENT
Start: 2017-10-11 | End: 2017-10-11 | Stop reason: HOSPADM

## 2017-10-11 RX ORDER — SODIUM CHLORIDE 9 MG/ML
1000 INJECTION, SOLUTION INTRAVENOUS CONTINUOUS PRN
Status: CANCELLED
Start: 2017-10-18

## 2017-10-11 RX ORDER — OXYCODONE HYDROCHLORIDE 15 MG/1
15 TABLET ORAL EVERY 8 HOURS PRN
Qty: 90 TABLET | Refills: 0 | Status: SHIPPED | OUTPATIENT
Start: 2017-10-11 | End: 2017-11-08

## 2017-10-11 RX ORDER — EPINEPHRINE 0.3 MG/.3ML
0.3 INJECTION SUBCUTANEOUS EVERY 5 MIN PRN
Status: CANCELLED | OUTPATIENT
Start: 2017-11-01

## 2017-10-11 RX ORDER — EPINEPHRINE 0.3 MG/.3ML
0.3 INJECTION SUBCUTANEOUS EVERY 5 MIN PRN
Status: CANCELLED | OUTPATIENT
Start: 2017-10-25

## 2017-10-11 RX ORDER — MEPERIDINE HYDROCHLORIDE 50 MG/ML
25 INJECTION INTRAMUSCULAR; INTRAVENOUS; SUBCUTANEOUS EVERY 30 MIN PRN
Status: CANCELLED | OUTPATIENT
Start: 2017-10-18

## 2017-10-11 RX ORDER — EPINEPHRINE 1 MG/ML
0.3 INJECTION, SOLUTION INTRAMUSCULAR; SUBCUTANEOUS EVERY 5 MIN PRN
Status: CANCELLED | OUTPATIENT
Start: 2017-10-18

## 2017-10-11 RX ORDER — MEPERIDINE HYDROCHLORIDE 50 MG/ML
25 INJECTION INTRAMUSCULAR; INTRAVENOUS; SUBCUTANEOUS EVERY 30 MIN PRN
Status: CANCELLED | OUTPATIENT
Start: 2017-11-01

## 2017-10-11 RX ORDER — HEPARIN SODIUM (PORCINE) LOCK FLUSH IV SOLN 100 UNIT/ML 100 UNIT/ML
500 SOLUTION INTRAVENOUS EVERY 8 HOURS
Status: CANCELLED
Start: 2017-10-11

## 2017-10-11 RX ORDER — DIPHENHYDRAMINE HYDROCHLORIDE 50 MG/ML
50 INJECTION INTRAMUSCULAR; INTRAVENOUS
Status: CANCELLED
Start: 2017-11-08

## 2017-10-11 RX ORDER — LORAZEPAM 2 MG/ML
0.5 INJECTION INTRAMUSCULAR EVERY 4 HOURS PRN
Status: CANCELLED
Start: 2017-10-18

## 2017-10-11 RX ORDER — ALBUTEROL SULFATE 0.83 MG/ML
2.5 SOLUTION RESPIRATORY (INHALATION)
Status: CANCELLED | OUTPATIENT
Start: 2017-10-18

## 2017-10-11 RX ORDER — MEPERIDINE HYDROCHLORIDE 50 MG/ML
25 INJECTION INTRAMUSCULAR; INTRAVENOUS; SUBCUTANEOUS EVERY 30 MIN PRN
Status: CANCELLED | OUTPATIENT
Start: 2017-10-25

## 2017-10-11 RX ORDER — HEPARIN SODIUM (PORCINE) LOCK FLUSH IV SOLN 100 UNIT/ML 100 UNIT/ML
5 SOLUTION INTRAVENOUS EVERY 8 HOURS
Status: CANCELLED
Start: 2017-10-18

## 2017-10-11 RX ORDER — SODIUM CHLORIDE 9 MG/ML
1000 INJECTION, SOLUTION INTRAVENOUS CONTINUOUS PRN
Status: CANCELLED
Start: 2017-11-01

## 2017-10-11 RX ORDER — DIPHENHYDRAMINE HCL 25 MG
50 CAPSULE ORAL ONCE
Status: CANCELLED | OUTPATIENT
Start: 2017-11-01

## 2017-10-11 RX ORDER — LORAZEPAM 2 MG/ML
0.5 INJECTION INTRAMUSCULAR EVERY 4 HOURS PRN
Status: CANCELLED
Start: 2017-11-08

## 2017-10-11 RX ORDER — METHYLPREDNISOLONE SODIUM SUCCINATE 125 MG/2ML
125 INJECTION, POWDER, LYOPHILIZED, FOR SOLUTION INTRAMUSCULAR; INTRAVENOUS
Status: CANCELLED
Start: 2017-10-25

## 2017-10-11 RX ORDER — LORAZEPAM 2 MG/ML
0.5 INJECTION INTRAMUSCULAR EVERY 4 HOURS PRN
Status: CANCELLED
Start: 2017-11-01

## 2017-10-11 RX ORDER — EPINEPHRINE 1 MG/ML
0.3 INJECTION, SOLUTION INTRAMUSCULAR; SUBCUTANEOUS EVERY 5 MIN PRN
Status: CANCELLED | OUTPATIENT
Start: 2017-10-25

## 2017-10-11 RX ORDER — METHYLPREDNISOLONE SODIUM SUCCINATE 125 MG/2ML
125 INJECTION, POWDER, LYOPHILIZED, FOR SOLUTION INTRAMUSCULAR; INTRAVENOUS
Status: CANCELLED
Start: 2017-11-08

## 2017-10-11 RX ORDER — ACETAMINOPHEN 325 MG/1
650 TABLET ORAL ONCE
Status: CANCELLED | OUTPATIENT
Start: 2017-10-18

## 2017-10-11 RX ORDER — SODIUM CHLORIDE 9 MG/ML
1000 INJECTION, SOLUTION INTRAVENOUS CONTINUOUS PRN
Status: CANCELLED
Start: 2017-10-25

## 2017-10-11 RX ORDER — ALBUTEROL SULFATE 0.83 MG/ML
2.5 SOLUTION RESPIRATORY (INHALATION)
Status: CANCELLED | OUTPATIENT
Start: 2017-11-08

## 2017-10-11 RX ORDER — HEPARIN SODIUM (PORCINE) LOCK FLUSH IV SOLN 100 UNIT/ML 100 UNIT/ML
5 SOLUTION INTRAVENOUS EVERY 8 HOURS
Status: CANCELLED
Start: 2017-11-01

## 2017-10-11 RX ORDER — MEPERIDINE HYDROCHLORIDE 50 MG/ML
25 INJECTION INTRAMUSCULAR; INTRAVENOUS; SUBCUTANEOUS EVERY 30 MIN PRN
Status: CANCELLED | OUTPATIENT
Start: 2017-11-08

## 2017-10-11 RX ORDER — EPINEPHRINE 0.3 MG/.3ML
0.3 INJECTION SUBCUTANEOUS EVERY 5 MIN PRN
Status: CANCELLED | OUTPATIENT
Start: 2017-11-08

## 2017-10-11 RX ADMIN — BORTEZOMIB 2.2 MG: 3.5 INJECTION, POWDER, LYOPHILIZED, FOR SOLUTION INTRAVENOUS; SUBCUTANEOUS at 11:28

## 2017-10-11 RX ADMIN — SODIUM CHLORIDE, PRESERVATIVE FREE 500 UNITS: 5 INJECTION INTRAVENOUS at 11:32

## 2017-10-11 ASSESSMENT — PAIN SCALES - GENERAL: PAINLEVEL: NO PAIN (0)
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
## 2017-10-11 NOTE — PATIENT INSTRUCTIONS
Continue chemotherapy.  Scheduled/dorita  Follow up in 4-5 weeks.  Scheduled/dorita    AVS printed & given to patient/Dorita

## 2017-10-11 NOTE — PROGRESS NOTES
Infusion Nursing Note:  Amira IVY Arreola presents today for C5D22 Velcade.    Patient seen by provider today: Yes: Dr. Roberts   present during visit today: Not Applicable.    Note: N/A.    Intravenous Access:  Labs drawn without difficulty.  Implanted Port.    Treatment Conditions:  Lab Results   Component Value Date    HGB 12.1 10/11/2017     Lab Results   Component Value Date    WBC 6.5 10/11/2017      Lab Results   Component Value Date    ANEU 6.0 10/11/2017     Lab Results   Component Value Date     10/11/2017      Results reviewed, labs MET treatment parameters, ok to proceed with treatment.        Post Infusion Assessment:  Patient tolerated injection without incident.  Site patent and intact, free from redness, edema or discomfort.  No evidence of extravasations.  Access discontinued per protocol.    Discharge Plan:   Discharge instructions reviewed with: Patient and Family.  Patient and/or family verbalized understanding of discharge instructions and all questions answered.  Copy of AVS reviewed with patient and/or family.  Patient will return in 1 week for next appointment.  Patient discharged in stable condition accompanied by: daughter.  Departure Mode: Ambulatory.    David Kearney RN

## 2017-10-11 NOTE — PROGRESS NOTES
"Oncology Rooming Note    October 11, 2017 10:02 AM   Amira Arreola is a 85 year old female who presents for:    No chief complaint on file.    Initial Vitals: /86  Temp 97.6  F (36.4  C) (Oral)  Resp 16  Wt 62.6 kg (138 lb)  SpO2 98%  BMI 22.28 kg/m2 Estimated body mass index is 22.28 kg/(m^2) as calculated from the following:    Height as of an earlier encounter on 10/11/17: 1.676 m (5' 5.98\").    Weight as of this encounter: 62.6 kg (138 lb). Body surface area is 1.71 meters squared.  Data Unavailable Comment: Data Unavailable   No LMP recorded. Patient is postmenopausal.  Allergies reviewed: Yes  Medications reviewed: Yes    Medications: Medication refills not needed today.  Pharmacy name entered into [facility]: [facility] [address] [place] [address] 7 AT [facility] [address]    Clinical concerns: none     4 minutes for nursing intake (face to face time)     Gwen Hill CMA              DISCHARGE PLAN:  Next appointments: See patient instruction section Patient given discharge instructions and brought to [facility] given the report. Sonam and scheduling to schedule follow-up and chemo appointments.    Departure Mode: Ambulatory  Accompanied by: self  4 minutes for nursing discharge (face to face time)   Gwen Hill CMA      "

## 2017-10-11 NOTE — MR AVS SNAPSHOT
After Visit Summary   10/11/2017    Amira Arreola    MRN: [id number]           Patient Information     Date Of Birth          7/17/1932        Visit Information        Provider Department      10/11/2017 10:00 AM  INFUSION CHAIR 10 [facility]        Today's Diagnoses     Multiple myeloma not having achieved remission (H)    -  1    Portacath in place           Follow-ups after your visit        Your next 10 appointments already scheduled     Oct 16, 2017 10:30 AM CDT   Anticoagulation Visit with [facility]UL[facility] CLINIC   [facility] ([address] [address]   [phone]            Oct 18, 2017 10:00 AM CDT   Level 2 with SH INFUSION CHAIR 17   [facility] ([facility])    [facility]  [address]  [place] [address]   [phone]            Oct 25, 2017 10:30 AM CDT   Level 2 with SH INFUSION CHAIR 17   [facility] ([facility])    [facility]  [address]  [place] [address]   [phone]            Nov 01, 2017 10:30 AM CDT   Level 2 with SH INFUSION CHAIR 7   [facility] ([facility])    [facility]  [address]  [place] [address]   [phone]            Nov 08, 2017  9:30 AM CST   Level 2 with SH INFUSION CHAIR 17   [facility] ([facility])    [facility]  [address]  [place] [address]   [phone]            Nov 08, 2017 10:00 AM CST   Return Visit with Shayne Roberts MD   [facility])    [facility]  [address]  [place] [address]   [phone]              Who to  "contact     If you have questions or need follow up information about today's clinic visit or your schedule please contact [facility] directly at [phone].  Normal or non-critical lab and imaging results will be communicated to you by MyChart, letter or phone within 4 business days after the clinic has received the results. If you do not hear from us within 7 days, please contact the clinic through MyChart or phone. If you have a critical or abnormal lab result, we will notify you by phone as soon as possible.  Submit refill requests through [employer] or call your pharmacy and they will forward the refill request to us. Please allow 3 business days for your refill to be completed.          Additional Information About Your Visit        [employer] Information     [employer] lets you send messages to your doctor, view your test results, renew your prescriptions, schedule appointments and more. To sign up, go to [email or web address] . Click on \"Log in\" on the left side of the screen, which will take you to the Welcome page. Then click on \"Sign up Now\" on the right side of the page.     You will be asked to enter the access code listed below, as well as some personal information. Please follow the directions to create your username and password.     Your access code is: [email or web address]  Expires: 2017 10:24 AM     Your access code will  in 90 days. If you need help or a new code, please call your [facility] clinic or [phone].        Care EveryWhere ID     This is your Care EveryWhere ID. This could be used by other organizations to access your [facility] medical records  [id number]        Your Vitals Were     Temperature Respirations Height Pulse Oximetry BMI (Body Mass Index)       97.6  F (36.4  C) (Oral) 16 1.676 m (5' 5.98\") 98% 22.28 kg/m2        Blood Pressure from Last 3 Encounters:   10/11/17 144/86   10/11/17 144/86   10/04/17 113/64    Weight from Last 3 Encounters: "   10/11/17 62.6 kg (138 lb)   10/11/17 62.6 kg (138 lb)   10/04/17 64 kg (141 lb 3.2 oz)              We Performed the Following     CBC with platelets differential          Where to get your medicines      Some of these will need a paper prescription and others can be bought over the counter.  Ask your nurse if you have questions.     Bring a paper prescription for each of these medications     oxyCODONE 15 MG IR tablet          Primary Care Provider Office Phone # Fax #    Addy Sean Frias [phone] [address] [facility] [address]  [place] [address]        Equal Access to Services     [facility]: Hadii aad divina hadmahendra Galloway, waaxda brenda, qaybta kaalmada alonso, hung dominique . So [facility] [phone].    ATENCIÓN: Si habla español, tiene a barlow disposición servicios gratuitos de asistencia lingüística. [facility] [phone].    We comply with applicable federal civil rights laws and [place] laws. We do not discriminate on the basis of race, color, national origin, age, disability, sex, sexual orientation, or gender identity.            Thank you!     Thank you for choosing [facility]  for your care. Our goal is always to provide you with excellent care. Hearing back from our patients is one way we can continue to improve our services. Please take a few minutes to complete the written survey that you may receive in the mail after your visit with us. Thank you!             Your Updated Medication List - Protect others around you: Learn how to safely use, store and throw away your medicines at www.disposemymeds.org.          This list is accurate as of: 10/11/17 11:32 AM.  Always use your most recent med list.                   Brand Name Dispense Instructions for use Diagnosis    acyclovir 400 MG tablet    ZOVIRAX    60 tablet    Take 1 tablet (400 mg) by mouth 2 times daily Viral Prophylaxis.    Multiple myeloma not having  achieved remission (H)       ASPIRIN NOT PRESCRIBED    INTENTIONAL    0 each    Antiplatelet medication not prescribed intentionally due to Current anticoagulant therapy (warfarin/enoxaparin)    Paroxysmal atrial fibrillation (H)       CALCIUM CITRATE + PO      Take 2,000 mg by mouth daily 2 tabs        carboxymethylcellulose 0.5 % Soln ophthalmic solution    REFRESH PLUS     1 drop 4 times daily        CLARINEX PO      Take by mouth daily Taking claritin        COMPAZINE PO      Take 10 mg by mouth daily as needed        cycloSPORINE 0.05 % ophthalmic emulsion    RESTASIS     Place 1 drop into both eyes every 12 hours        DAILY MULTIVITAMIN PO      Take 1 tablet by mouth daily.    Routine general medical examination at a health care facility       * dexamethasone 4 MG tablet    DECADRON    28 tablet    Take 20mg (5 tablets) PO every week on the morning of velcade injection. Then take 1 tablet (4mg) by mouth for two days after darzalex.    Multiple myeloma not having achieved remission (H)       * dexamethasone 4 MG tablet    DECADRON    28 tablet    Take 20mg (5 tablets) by mouth every week on the morning of velcade injection.    Multiple myeloma not having achieved remission (H)       erythromycin ophthalmic ointment    ROMYCIN     Place 1 Application into both eyes At Bedtime        GENTLE STOOL SOFTENER PO      Take 100 mg by mouth daily        lidocaine-prilocaine cream    EMLA    30 g    Apply topically as needed for moderate pain Apply dollop size amount to port site 30-60 min prior to accessing    Multiple myeloma not having achieved remission (H)       LORazepam 0.5 MG tablet    ATIVAN    30 tablet    Take 1 tablet (0.5 mg) by mouth every 8 hours as needed for anxiety    Multiple myeloma not having achieved remission (H)       metoprolol 50 MG 24 hr tablet    TOPROL XL    270 tablet    Take 2 tablets (100mg) in the morning and 1 tablet (50mg) in the evening by mouth daily    Paroxysmal atrial  fibrillation (H)       oxyCODONE 15 MG IR tablet    ROXICODONE    90 tablet    Take 1 tablet (15 mg) by mouth every 8 hours as needed for pain maximum 4 tablet(s) per day    Multiple myeloma not having achieved remission (H)       polyethylene glycol powder    MIRALAX/GLYCOLAX     Take 1 capful by mouth daily as needed    Bilateral leg edema       timolol 0.25 % ophthalmic solution    TIMOPTIC     Place 1 drop into the right eye 2 times daily        TYLENOL PO      Take 500 mg by mouth every 6 hours as needed for mild pain or fever        UNABLE TO FIND      MEDICATION NAME: Fresh Coat eye drops        VITAMIN D3 PO      Take 1,000 Units by mouth daily        warfarin 4 MG tablet    COUMADIN    110 tablet    TAKE ONE AND ONE-HALF TABLETS BY MOUTH ON MONDAY, WEDNESDAY, AND FRIDAY AND ONE TABLET THE OTHER DAYS OF THE WEEK    Paroxysmal atrial fibrillation (H), Long-term (current) use of anticoagulants       ZOMETA IV      Inject into the vein every 30 days Every 3 month dosing        * Notice:  This list has 2 medication(s) that are the same as other medications prescribed for you. Read the directions carefully, and ask your doctor or other care provider to review them with you.

## 2017-10-11 NOTE — MR AVS SNAPSHOT
After Visit Summary   10/11/2017    Amira Arreola    MRN: [id number]           Patient Information     Date Of Birth          7/17/1932        Visit Information        Provider Department      10/11/2017 10:30 AM Shayne Roberts MD [facility]        Today's Diagnoses     Multiple myeloma not having achieved remission (H)    -  1      Care Instructions    Continue chemotherapy.  Follow up in 4-5 weeks.          Follow-ups after your visit        Your next 10 appointments already scheduled     Oct 16, 2017 10:30 AM CDT   Anticoagulation Visit with [facility]   [facility]    [address] [place] [address]   [phone]            Oct 18, 2017 10:00 AM CDT   Level 2 with SH INFUSION CHAIR 17   [facility] ([facility])    [facility]  [address]  [place] [address]   [phone]            Oct 25, 2017 10:30 AM CDT   Level 2 with SH INFUSION CHAIR 17   [facility] ([facility])    [facility]  [address]  [place] [address]   [phone]            Nov 01, 2017 10:30 AM CDT   Level 2 with SH INFUSION CHAIR 7   [facility] ([facility])    [facility]  [address]  [place] [address]   [phone]            Nov 08, 2017  9:30 AM CST   Level 2 with SH INFUSION CHAIR 17   [facility] ([facility])    [facility]  [address]  [place] [address]   [phone]            Nov 08, 2017 10:00 AM CST   Return Visit with Shayne Roberts MD   [facility] ([facility])    [facility]  [address]  [place] [address]  "  [phone]              Who to contact     If you have questions or need follow up information about today's clinic visit or your schedule please contact [facility] directly at [phone].  Normal or non-critical lab and imaging results will be communicated to you by MyChart, letter or phone within 4 business days after the clinic has received the results. If you do not hear from us within 7 days, please contact the clinic through MyChart or phone. If you have a critical or abnormal lab result, we will notify you by phone as soon as possible.  Submit refill requests through [employer] or call your pharmacy and they will forward the refill request to us. Please allow 3 business days for your refill to be completed.          Additional Information About Your Visit        [employer] Information     [employer] lets you send messages to your doctor, view your test results, renew your prescriptions, schedule appointments and more. To sign up, go to [email or web address] . Click on \"Log in\" on the left side of the screen, which will take you to the Welcome page. Then click on \"Sign up Now\" on the right side of the page.     You will be asked to enter the access code listed below, as well as some personal information. Please follow the directions to create your username and password.     Your access code is: [email or web address]  Expires: 2017 10:24 AM     Your access code will  in 90 days. If you need help or a new code, please call your [place] clinic or [phone].        Care EveryWhere ID     This is your Care EveryWhere ID. This could be used by other organizations to access your [place] medical records  [id number]        Your Vitals Were     Temperature Respirations Pulse Oximetry BMI (Body Mass Index)          97.6  F (36.4  C) (Oral) 16 98% 22.28 kg/m2         Blood Pressure from Last 3 Encounters:   10/11/17 144/86   10/11/17 144/86   10/04/17 113/64    Weight from Last 3 Encounters: "   10/11/17 62.6 kg (138 lb)   10/11/17 62.6 kg (138 lb)   10/04/17 64 kg (141 lb 3.2 oz)              Today, you had the following     No orders found for display         Where to get your medicines      Some of these will need a paper prescription and others can be bought over the counter.  Ask your nurse if you have questions.     Bring a paper prescription for each of these medications     oxyCODONE 15 MG IR tablet          Primary Care Provider Office Phone # Fax #    Addy Sean Frias [phone] [address] [facility] [address]  [place] [address]        Equal Access to Services     [facility]: Hadii liane Galloway, wajanetda brenda, qayazmin kaalmada alonso, hung dominique . So [facility] [phone].    ATENCIÓN: Si habla español, tiene a barlow disposición servicios gratuitos de asistencia lingüística. [facility] [phone].    We comply with applicable federal civil rights laws and [place] laws. We do not discriminate on the basis of race, color, national origin, age, disability, sex, sexual orientation, or gender identity.            Thank you!     Thank you for choosing [facility]  for your care. Our goal is always to provide you with excellent care. Hearing back from our patients is one way we can continue to improve our services. Please take a few minutes to complete the written survey that you may receive in the mail after your visit with us. Thank you!             Your Updated Medication List - Protect others around you: Learn how to safely use, store and throw away your medicines at www.disposemymeds.org.          This list is accurate as of: 10/11/17 11:06 AM.  Always use your most recent med list.                   Brand Name Dispense Instructions for use Diagnosis    acyclovir 400 MG tablet    ZOVIRAX    60 tablet    Take 1 tablet (400 mg) by mouth 2 times daily Viral Prophylaxis.    Multiple myeloma not having achieved remission (H)        ASPIRIN NOT PRESCRIBED    INTENTIONAL    0 each    Antiplatelet medication not prescribed intentionally due to Current anticoagulant therapy (warfarin/enoxaparin)    Paroxysmal atrial fibrillation (H)       CALCIUM CITRATE + PO      Take 2,000 mg by mouth daily 2 tabs        carboxymethylcellulose 0.5 % Soln ophthalmic solution    REFRESH PLUS     1 drop 4 times daily        CLARINEX PO      Take by mouth daily Taking claritin        COMPAZINE PO      Take 10 mg by mouth daily as needed        cycloSPORINE 0.05 % ophthalmic emulsion    RESTASIS     Place 1 drop into both eyes every 12 hours        DAILY MULTIVITAMIN PO      Take 1 tablet by mouth daily.    Routine general medical examination at a health care facility       * dexamethasone 4 MG tablet    DECADRON    28 tablet    Take 20mg (5 tablets) PO every week on the morning of velcade injection. Then take 1 tablet (4mg) by mouth for two days after darzalex.    Multiple myeloma not having achieved remission (H)       * dexamethasone 4 MG tablet    DECADRON    28 tablet    Take 20mg (5 tablets) by mouth every week on the morning of velcade injection.    Multiple myeloma not having achieved remission (H)       erythromycin ophthalmic ointment    ROMYCIN     Place 1 Application into both eyes At Bedtime        GENTLE STOOL SOFTENER PO      Take 100 mg by mouth daily        lidocaine-prilocaine cream    EMLA    30 g    Apply topically as needed for moderate pain Apply dollop size amount to port site 30-60 min prior to accessing    Multiple myeloma not having achieved remission (H)       LORazepam 0.5 MG tablet    ATIVAN    30 tablet    Take 1 tablet (0.5 mg) by mouth every 8 hours as needed for anxiety    Multiple myeloma not having achieved remission (H)       metoprolol 50 MG 24 hr tablet    TOPROL XL    270 tablet    Take 2 tablets (100mg) in the morning and 1 tablet (50mg) in the evening by mouth daily    Paroxysmal atrial fibrillation (H)       oxyCODONE 15  MG IR tablet    ROXICODONE    90 tablet    Take 1 tablet (15 mg) by mouth every 8 hours as needed for pain maximum 4 tablet(s) per day    Multiple myeloma not having achieved remission (H)       polyethylene glycol powder    MIRALAX/GLYCOLAX     Take 1 capful by mouth daily as needed    Bilateral leg edema       timolol 0.25 % ophthalmic solution    TIMOPTIC     Place 1 drop into the right eye 2 times daily        TYLENOL PO      Take 500 mg by mouth every 6 hours as needed for mild pain or fever        UNABLE TO FIND      MEDICATION NAME: Fresh Coat eye drops        VITAMIN D3 PO      Take 1,000 Units by mouth daily        warfarin 4 MG tablet    COUMADIN    110 tablet    TAKE ONE AND ONE-HALF TABLETS BY MOUTH ON MONDAY, WEDNESDAY, AND FRIDAY AND ONE TABLET THE OTHER DAYS OF THE WEEK    Paroxysmal atrial fibrillation (H), Long-term (current) use of anticoagulants       ZOMETA IV      Inject into the vein every 30 days Every 3 month dosing        * Notice:  This list has 2 medication(s) that are the same as other medications prescribed for you. Read the directions carefully, and ask your doctor or other care provider to review them with you.

## 2017-10-18 ENCOUNTER — TELEPHONE (OUTPATIENT)
Dept: NURSING | Facility: CLINIC | Age: 82
End: 2017-10-18

## 2017-10-18 ENCOUNTER — INFUSION THERAPY VISIT (OUTPATIENT)
Dept: INFUSION THERAPY | Facility: CLINIC | Age: 82
End: 2017-10-18
Attending: INTERNAL MEDICINE
Payer: MEDICARE

## 2017-10-18 ENCOUNTER — HOSPITAL ENCOUNTER (OUTPATIENT)
Facility: CLINIC | Age: 82
Setting detail: SPECIMEN
Discharge: HOME | End: 2017-10-18
Attending: INTERNAL MEDICINE | Admitting: INTERNAL MEDICINE
Payer: MEDICARE

## 2017-10-18 VITALS
BODY MASS INDEX: 22.34 KG/M2 | SYSTOLIC BLOOD PRESSURE: 123 MMHG | TEMPERATURE: 97.7 F | DIASTOLIC BLOOD PRESSURE: 77 MMHG | HEIGHT: 66 IN | RESPIRATION RATE: 16 BRPM | WEIGHT: 139 LBS | HEART RATE: 77 BPM

## 2017-10-18 DIAGNOSIS — C90.00 MULTIPLE MYELOMA NOT HAVING ACHIEVED REMISSION (H): Primary | ICD-10-CM

## 2017-10-18 DIAGNOSIS — Z79.01 LONG TERM CURRENT USE OF ANTICOAGULANT THERAPY: ICD-10-CM

## 2017-10-18 DIAGNOSIS — I48.0 PAROXYSMAL ATRIAL FIBRILLATION (H): Primary | ICD-10-CM

## 2017-10-18 LAB
ALBUMIN SERPL-MCNC: 3.5 G/DL (ref 3.4–5)
ALP SERPL-CCNC: 76 U/L (ref 40–150)
ALT SERPL W P-5'-P-CCNC: 27 U/L (ref 0–50)
AST SERPL W P-5'-P-CCNC: 22 U/L (ref 0–45)
BASOPHILS # BLD AUTO: 0 10E9/L (ref 0–0.2)
BASOPHILS NFR BLD AUTO: 0 %
BILIRUB DIRECT SERPL-MCNC: 0.2 MG/DL (ref 0–0.2)
BILIRUB SERPL-MCNC: 0.5 MG/DL (ref 0.2–1.3)
DIFFERENTIAL METHOD BLD: ABNORMAL
EOSINOPHIL # BLD AUTO: 0 10E9/L (ref 0–0.7)
EOSINOPHIL NFR BLD AUTO: 0 %
ERYTHROCYTE [DISTWIDTH] IN BLOOD BY AUTOMATED COUNT: 14.3 % (ref 10–15)
HCT VFR BLD AUTO: 37.1 % (ref 35–47)
HGB BLD-MCNC: 12.6 G/DL (ref 11.7–15.7)
IMM GRANULOCYTES # BLD: 0 10E9/L (ref 0–0.4)
IMM GRANULOCYTES NFR BLD: 0.2 %
INR PPP: 1.94 (ref 0.86–1.14)
LYMPHOCYTES # BLD AUTO: 0.4 10E9/L (ref 0.8–5.3)
LYMPHOCYTES NFR BLD AUTO: 6.3 %
MCH RBC QN AUTO: 34.3 PG (ref 26.5–33)
MCHC RBC AUTO-ENTMCNC: 34 G/DL (ref 31.5–36.5)
MCV RBC AUTO: 101 FL (ref 78–100)
MONOCYTES # BLD AUTO: 0.1 10E9/L (ref 0–1.3)
MONOCYTES NFR BLD AUTO: 1.7 %
NEUTROPHILS # BLD AUTO: 6 10E9/L (ref 1.6–8.3)
NEUTROPHILS NFR BLD AUTO: 91.8 %
NRBC # BLD AUTO: 0 10*3/UL
NRBC BLD AUTO-RTO: 0 /100
PLATELET # BLD AUTO: 137 10E9/L (ref 150–450)
PROT SERPL-MCNC: 6.2 G/DL (ref 6.8–8.8)
RBC # BLD AUTO: 3.67 10E12/L (ref 3.8–5.2)
WBC # BLD AUTO: 6.5 10E9/L (ref 4–11)

## 2017-10-18 PROCEDURE — A9270 NON-COVERED ITEM OR SERVICE: HCPCS | Mod: GY | Performed by: INTERNAL MEDICINE

## 2017-10-18 PROCEDURE — 84165 PROTEIN E-PHORESIS SERUM: CPT | Performed by: INTERNAL MEDICINE

## 2017-10-18 PROCEDURE — 96413 CHEMO IV INFUSION 1 HR: CPT

## 2017-10-18 PROCEDURE — 96415 CHEMO IV INFUSION ADDL HR: CPT

## 2017-10-18 PROCEDURE — 25000132 ZZH RX MED GY IP 250 OP 250 PS 637: Mod: GY | Performed by: INTERNAL MEDICINE

## 2017-10-18 PROCEDURE — 83883 ASSAY NEPHELOMETRY NOT SPEC: CPT | Performed by: INTERNAL MEDICINE

## 2017-10-18 PROCEDURE — 85610 PROTHROMBIN TIME: CPT | Performed by: INTERNAL MEDICINE

## 2017-10-18 PROCEDURE — 00000402 ZZHCL STATISTIC TOTAL PROTEIN: Performed by: INTERNAL MEDICINE

## 2017-10-18 PROCEDURE — 96367 TX/PROPH/DG ADDL SEQ IV INF: CPT

## 2017-10-18 PROCEDURE — 80076 HEPATIC FUNCTION PANEL: CPT | Performed by: INTERNAL MEDICINE

## 2017-10-18 PROCEDURE — 85025 COMPLETE CBC W/AUTO DIFF WBC: CPT | Performed by: INTERNAL MEDICINE

## 2017-10-18 PROCEDURE — 96401 CHEMO ANTI-NEOPL SQ/IM: CPT

## 2017-10-18 PROCEDURE — 25000128 H RX IP 250 OP 636: Performed by: INTERNAL MEDICINE

## 2017-10-18 RX ORDER — DEXAMETHASONE 4 MG/1
TABLET ORAL
Qty: 28 TABLET | Refills: 0 | Status: SHIPPED | OUTPATIENT
Start: 2017-10-18 | End: 2017-11-13

## 2017-10-18 RX ORDER — HEPARIN SODIUM (PORCINE) LOCK FLUSH IV SOLN 100 UNIT/ML 100 UNIT/ML
5 SOLUTION INTRAVENOUS EVERY 8 HOURS
Status: DISCONTINUED | OUTPATIENT
Start: 2017-10-18 | End: 2017-10-18 | Stop reason: HOSPADM

## 2017-10-18 RX ORDER — ACETAMINOPHEN 325 MG/1
650 TABLET ORAL ONCE
Status: COMPLETED | OUTPATIENT
Start: 2017-10-18 | End: 2017-10-18

## 2017-10-18 RX ADMIN — DIPHENHYDRAMINE HYDROCHLORIDE 50 MG: 50 INJECTION, SOLUTION INTRAMUSCULAR; INTRAVENOUS at 11:37

## 2017-10-18 RX ADMIN — BORTEZOMIB 2.2 MG: 3.5 INJECTION, POWDER, LYOPHILIZED, FOR SOLUTION INTRAVENOUS; SUBCUTANEOUS at 12:04

## 2017-10-18 RX ADMIN — SODIUM CHLORIDE 250 ML: 9 INJECTION, SOLUTION INTRAVENOUS at 10:57

## 2017-10-18 RX ADMIN — DARATUMUMAB 1000 MG: 100 INJECTION, SOLUTION, CONCENTRATE INTRAVENOUS at 12:05

## 2017-10-18 RX ADMIN — SODIUM CHLORIDE, PRESERVATIVE FREE 5 ML: 5 INJECTION INTRAVENOUS at 15:23

## 2017-10-18 RX ADMIN — ACETAMINOPHEN 650 MG: 325 TABLET ORAL at 11:37

## 2017-10-18 RX ADMIN — DEXAMETHASONE SODIUM PHOSPHATE 12 MG: 10 INJECTION, SOLUTION INTRAMUSCULAR; INTRAVENOUS at 11:19

## 2017-10-18 ASSESSMENT — PAIN SCALES - GENERAL: PAINLEVEL: NO PAIN (0)

## 2017-10-18 NOTE — MR AVS SNAPSHOT
After Visit Summary   10/18/2017    Amira Arreola    MRN: [id number]           Patient Information     Date Of Birth          7/17/1932        Visit Information        Provider Department      10/18/2017 10:00 AM  INFUSION CHAIR 17 [facility]        Today's Diagnoses     Multiple myeloma not having achieved remission (H)    -  1    Long term current use of anticoagulant therapy           Follow-ups after your visit        Your next 10 appointments already scheduled     Oct 25, 2017 10:30 AM CDT   Level 2 with  INFUSION CHAIR 17   [facility] ([facility])    [facility]  [address]  [place] [address]   [phone]            Nov 01, 2017 10:30 AM CDT   Level 2 with  INFUSION CHAIR 7   [facility] ([facility])    [facility]  [address]  [place] [address]   [phone]            Nov 08, 2017  9:30 AM CST   Level 2 with  INFUSION CHAIR 17   [facility] ([facility])    [facility]  [address]  [place] [address]   [phone]            Nov 08, 2017 10:00 AM CST   Return Visit with Shayne Roberts MD   [facility] ([facility])    [facility]  [address]  [place] [address]   [phone]              Who to contact     If you have questions or need follow up information about today's clinic visit or your schedule please contact [facility] directly at [phone].  Normal or non-critical lab and imaging results will be communicated to you by MyChart, letter or phone within 4 business days after the clinic has received the results. If you do not hear from us within 7 days, please contact the clinic through [employer]  "or phone. If you have a critical or abnormal lab result, we will notify you by phone as soon as possible.  Submit refill requests through [employer] or call your pharmacy and they will forward the refill request to us. Please allow 3 business days for your refill to be completed.          Additional Information About Your Visit        [employer] Information     [employer] lets you send messages to your doctor, view your test results, renew your prescriptions, schedule appointments and more. To sign up, go to [email or web address] . Click on \"Log in\" on the left side of the screen, which will take you to the Welcome page. Then click on \"Sign up Now\" on the right side of the page.     You will be asked to enter the access code listed below, as well as some personal information. Please follow the directions to create your username and password.     Your access code is: [email or web address]  Expires: 2017 10:24 AM     Your access code will  in 90 days. If you need help or a new code, please call your [place] clinic or [phone].        Care EveryWhere ID     This is your Care EveryWhere ID. This could be used by other organizations to access your [place] medical records  [id number]        Your Vitals Were     Pulse Temperature Respirations BMI (Body Mass Index)          77 97.7  F (36.5  C) (Oral) 16 22.45 kg/m2         Blood Pressure from Last 3 Encounters:   10/18/17 123/77   10/11/17 144/86   10/11/17 144/86    Weight from Last 3 Encounters:   10/18/17 63 kg (139 lb)   10/11/17 62.6 kg (138 lb)   10/11/17 62.6 kg (138 lb)              We Performed the Following     CBC with platelets differential     Hepatic panel     INR     Kappa and lambda light chain     Protein electrophoresis          Today's Medication Changes          These changes are accurate as of: 10/18/17  4:35 PM.  If you have any questions, ask your nurse or doctor.               These medicines have changed or have updated prescriptions.        " Dose/Directions    * dexamethasone 4 MG tablet   Commonly known as:  DECADRON   This may have changed:  Another medication with the same name was added. Make sure you understand how and when to take each.   Used for:  Multiple myeloma not having achieved remission (H)        Take 20mg (5 tablets) PO every week on the morning of velcade injection. Then take 1 tablet (4mg) by mouth for two days after darzalex.   Quantity:  28 tablet   Refills:  0       * dexamethasone 4 MG tablet   Commonly known as:  DECADRON   This may have changed:  Another medication with the same name was added. Make sure you understand how and when to take each.   Used for:  Multiple myeloma not having achieved remission (H)        Take 20mg (5 tablets) by mouth every week on the morning of velcade injection.   Quantity:  28 tablet   Refills:  0       * dexamethasone 4 MG tablet   Commonly known as:  DECADRON   This may have changed:  You were already taking a medication with the same name, and this prescription was added. Make sure you understand how and when to take each.   Used for:  Multiple myeloma not having achieved remission (H)        Take 20mg (5 tablets) by mouth every week on the morning of velcade injection.   Quantity:  28 tablet   Refills:  0       * Notice:  This list has 3 medication(s) that are the same as other medications prescribed for you. Read the directions carefully, and ask your doctor or other care provider to review them with you.         Where to get your medicines      These medications were sent to [facility] [address] [facility] [place] [address] [facility] [address] AT [facility] [address]  [address], [place] [address]     Phone:  [phone]     dexamethasone 4 MG tablet                Primary Care Provider Office Phone # Fax #    Addy Frias [phone] [address]  [facility] [address]        Equal Access to Services     [facility]: Gissel Galloway,  rhonda iselaalexis, dahlia gupta, hung lancasteraamorteza ah. So [facility] [phone].    ATENCIÓN: Si feli park, tiene a barlow disposición servicios gratuitos de asistencia lingüística. Kadie al [phone].    We comply with applicable federal civil rights laws and [place] laws. We do not discriminate on the basis of race, color, national origin, age, disability, sex, sexual orientation, or gender identity.            Thank you!     Thank you for choosing [facility]  for your care. Our goal is always to provide you with excellent care. Hearing back from our patients is one way we can continue to improve our services. Please take a few minutes to complete the written survey that you may receive in the mail after your visit with us. Thank you!             Your Updated Medication List - Protect others around you: Learn how to safely use, store and throw away your medicines at www.disposemymeds.org.          This list is accurate as of: 10/18/17  4:35 PM.  Always use your most recent med list.                   Brand Name Dispense Instructions for use Diagnosis    acyclovir 400 MG tablet    ZOVIRAX    60 tablet    Take 1 tablet (400 mg) by mouth 2 times daily Viral Prophylaxis.    Multiple myeloma not having achieved remission (H)       CALCIUM CITRATE + PO      Take 2,000 mg by mouth daily 2 tabs        carboxymethylcellulose 0.5 % Soln ophthalmic solution    REFRESH PLUS     1 drop 4 times daily        CLARINEX PO      Take by mouth daily Taking claritin        COMPAZINE PO      Take 10 mg by mouth daily as needed        cycloSPORINE 0.05 % ophthalmic emulsion    RESTASIS     Place 1 drop into both eyes every 12 hours        DAILY MULTIVITAMIN PO      Take 1 tablet by mouth daily.    Routine general medical examination at a health care facility       * dexamethasone 4 MG tablet    DECADRON    28 tablet    Take 20mg (5 tablets) PO every week on the morning of  velcade injection. Then take 1 tablet (4mg) by mouth for two days after darzalex.    Multiple myeloma not having achieved remission (H)       * dexamethasone 4 MG tablet    DECADRON    28 tablet    Take 20mg (5 tablets) by mouth every week on the morning of velcade injection.    Multiple myeloma not having achieved remission (H)       * dexamethasone 4 MG tablet    DECADRON    28 tablet    Take 20mg (5 tablets) by mouth every week on the morning of velcade injection.    Multiple myeloma not having achieved remission (H)       erythromycin ophthalmic ointment    ROMYCIN     Place 1 Application into both eyes At Bedtime        GENTLE STOOL SOFTENER PO      Take 100 mg by mouth daily        lidocaine-prilocaine cream    EMLA    30 g    Apply topically as needed for moderate pain Apply dollop size amount to port site 30-60 min prior to accessing    Multiple myeloma not having achieved remission (H)       LORazepam 0.5 MG tablet    ATIVAN    30 tablet    Take 1 tablet (0.5 mg) by mouth every 8 hours as needed for anxiety    Multiple myeloma not having achieved remission (H)       metoprolol 50 MG 24 hr tablet    TOPROL XL    270 tablet    Take 2 tablets (100mg) in the morning and 1 tablet (50mg) in the evening by mouth daily    Paroxysmal atrial fibrillation (H)       oxyCODONE 15 MG IR tablet    ROXICODONE    90 tablet    Take 1 tablet (15 mg) by mouth every 8 hours as needed for pain maximum 4 tablet(s) per day    Multiple myeloma not having achieved remission (H)       polyethylene glycol powder    MIRALAX/GLYCOLAX     Take 1 capful by mouth daily as needed    Bilateral leg edema       timolol 0.25 % ophthalmic solution    TIMOPTIC     Place 1 drop into the right eye 2 times daily        TYLENOL PO      Take 500 mg by mouth every 6 hours as needed for mild pain or fever        UNABLE TO FIND      MEDICATION NAME: Fresh Coat eye drops        VITAMIN D3 PO      Take 1,000 Units by mouth daily        warfarin 4 MG tablet     COUMADIN    110 tablet    TAKE ONE AND ONE-HALF TABLETS BY MOUTH ON MONDAY, WEDNESDAY, AND FRIDAY AND ONE TABLET THE OTHER DAYS OF THE WEEK    Paroxysmal atrial fibrillation (H), Long-term (current) use of anticoagulants       ZOMETA IV      Inject into the vein every 30 days Every 3 month dosing        * Notice:  This list has 3 medication(s) that are the same as other medications prescribed for you. Read the directions carefully, and ask your doctor or other care provider to review them with you.

## 2017-10-18 NOTE — LETTER
October 25, 2017      Amira Arreola  [address]  [place] [address]        Dear Amira,         You are receiving this letter because you are overdue to have INR blood test completed with our Anticoagulation Clinic at [place]. The INR test helps us determine if you are receiving the right dose of Coumadin (warfarin).  It is very important that patients who are on Coumadin (warfarin) therapy have their INR level check regularly to ensure safety range.    Your last INR test at [facility] was completed on 10/9/17.  We were unable to reach you by phone with multiple attempts.  Please call our clinic at [phone] to schedule an appointment for INR recheck.  If your INR is being managed by an outside provider or facility or if you are no longer need this service from our clinic please let us know.     Thank you for your time.  If you have any questions please call us at [phone]    Sincerely,    [facility]

## 2017-10-18 NOTE — TELEPHONE ENCOUNTER
Patient over due for INR check.  Last INR on 10/9/17  Will attempt 3 times then send letter.   Left message for patient to call clinic to schedule INR f/u.  Please assist with appt.  Thank you    Dayana Barrera RN
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
## 2017-10-19 LAB
ALBUMIN SERPL ELPH-MCNC: 3.9 G/DL (ref 3.7–5.1)
ALPHA1 GLOB SERPL ELPH-MCNC: 0.3 G/DL (ref 0.2–0.4)
ALPHA2 GLOB SERPL ELPH-MCNC: 0.7 G/DL (ref 0.5–0.9)
B-GLOBULIN SERPL ELPH-MCNC: 0.7 G/DL (ref 0.6–1)
GAMMA GLOB SERPL ELPH-MCNC: 0.3 G/DL (ref 0.7–1.6)
KAPPA LC UR-MCNC: 2.93 MG/DL (ref 0.33–1.94)
KAPPA LC/LAMBDA SER: 9.77 {RATIO} (ref 0.26–1.65)
LAMBDA LC SERPL-MCNC: 0.3 MG/DL (ref 0.57–2.63)
M PROTEIN SERPL ELPH-MCNC: 0.1 G/DL
PROT PATTERN SERPL ELPH-IMP: ABNORMAL

## 2017-10-25 ENCOUNTER — INFUSION THERAPY VISIT (OUTPATIENT)
Dept: INFUSION THERAPY | Facility: CLINIC | Age: 82
End: 2017-10-25
Attending: INTERNAL MEDICINE
Payer: MEDICARE

## 2017-10-25 ENCOUNTER — HOSPITAL ENCOUNTER (OUTPATIENT)
Facility: CLINIC | Age: 82
Setting detail: SPECIMEN
Discharge: HOME | End: 2017-10-25
Attending: INTERNAL MEDICINE | Admitting: INTERNAL MEDICINE
Payer: MEDICARE

## 2017-10-25 VITALS
HEIGHT: 66 IN | RESPIRATION RATE: 18 BRPM | SYSTOLIC BLOOD PRESSURE: 132 MMHG | BODY MASS INDEX: 21.76 KG/M2 | TEMPERATURE: 97.7 F | HEART RATE: 77 BPM | DIASTOLIC BLOOD PRESSURE: 86 MMHG | WEIGHT: 135.4 LBS

## 2017-10-25 DIAGNOSIS — Z79.01 LONG TERM CURRENT USE OF ANTICOAGULANT THERAPY: ICD-10-CM

## 2017-10-25 DIAGNOSIS — Z95.828 PORTACATH IN PLACE: ICD-10-CM

## 2017-10-25 DIAGNOSIS — C90.00 MULTIPLE MYELOMA NOT HAVING ACHIEVED REMISSION (H): Primary | ICD-10-CM

## 2017-10-25 LAB
BASOPHILS # BLD AUTO: 0 10E9/L (ref 0–0.2)
BASOPHILS NFR BLD AUTO: 0 %
DIFFERENTIAL METHOD BLD: ABNORMAL
EOSINOPHIL # BLD AUTO: 0 10E9/L (ref 0–0.7)
EOSINOPHIL NFR BLD AUTO: 0.1 %
ERYTHROCYTE [DISTWIDTH] IN BLOOD BY AUTOMATED COUNT: 14 % (ref 10–15)
HCT VFR BLD AUTO: 37.2 % (ref 35–47)
HGB BLD-MCNC: 12.9 G/DL (ref 11.7–15.7)
IMM GRANULOCYTES # BLD: 0 10E9/L (ref 0–0.4)
IMM GRANULOCYTES NFR BLD: 0.3 %
INR PPP: 1.3 (ref 0.86–1.14)
LYMPHOCYTES # BLD AUTO: 0.4 10E9/L (ref 0.8–5.3)
LYMPHOCYTES NFR BLD AUTO: 5.7 %
MCH RBC QN AUTO: 34.9 PG (ref 26.5–33)
MCHC RBC AUTO-ENTMCNC: 34.7 G/DL (ref 31.5–36.5)
MCV RBC AUTO: 101 FL (ref 78–100)
MONOCYTES # BLD AUTO: 0.2 10E9/L (ref 0–1.3)
MONOCYTES NFR BLD AUTO: 2.7 %
NEUTROPHILS # BLD AUTO: 6.7 10E9/L (ref 1.6–8.3)
NEUTROPHILS NFR BLD AUTO: 91.2 %
NRBC # BLD AUTO: 0 10*3/UL
NRBC BLD AUTO-RTO: 0 /100
PLATELET # BLD AUTO: 151 10E9/L (ref 150–450)
RBC # BLD AUTO: 3.7 10E12/L (ref 3.8–5.2)
WBC # BLD AUTO: 7.4 10E9/L (ref 4–11)

## 2017-10-25 PROCEDURE — 85025 COMPLETE CBC W/AUTO DIFF WBC: CPT | Performed by: INTERNAL MEDICINE

## 2017-10-25 PROCEDURE — 25000128 H RX IP 250 OP 636: Mod: JW | Performed by: INTERNAL MEDICINE

## 2017-10-25 PROCEDURE — 96401 CHEMO ANTI-NEOPL SQ/IM: CPT

## 2017-10-25 PROCEDURE — 85610 PROTHROMBIN TIME: CPT | Performed by: INTERNAL MEDICINE

## 2017-10-25 RX ORDER — HEPARIN SODIUM (PORCINE) LOCK FLUSH IV SOLN 100 UNIT/ML 100 UNIT/ML
500 SOLUTION INTRAVENOUS EVERY 8 HOURS
Status: CANCELLED
Start: 2017-10-25

## 2017-10-25 RX ORDER — HEPARIN SODIUM (PORCINE) LOCK FLUSH IV SOLN 100 UNIT/ML 100 UNIT/ML
500 SOLUTION INTRAVENOUS EVERY 8 HOURS
Status: DISCONTINUED | OUTPATIENT
Start: 2017-10-25 | End: 2017-10-25 | Stop reason: HOSPADM

## 2017-10-25 RX ADMIN — BORTEZOMIB 2.2 MG: 3.5 INJECTION, POWDER, LYOPHILIZED, FOR SOLUTION INTRAVENOUS; SUBCUTANEOUS at 11:37

## 2017-10-25 RX ADMIN — SODIUM CHLORIDE, PRESERVATIVE FREE 500 UNITS: 5 INJECTION INTRAVENOUS at 11:03

## 2017-10-25 NOTE — MR AVS SNAPSHOT
After Visit Summary   10/25/2017    Amira Arreola    MRN: [id number]           Patient Information     Date Of Birth          7/17/1932        Visit Information        Provider Department      10/25/2017 10:30 AM  INFUSION CHAIR 17 [facility]        Today's Diagnoses     Multiple myeloma not having achieved remission (H)    -  1    Long term current use of anticoagulant therapy        Portacath in place           Follow-ups after your visit        Your next 10 appointments already scheduled     Nov 01, 2017 10:30 AM CDT   Level 5 with  INFUSION CHAIR 7   [facility] ([facility])    [facility]  [address]  [place] [address]   [phone]            Nov 08, 2017  9:30 AM CST   Level 2 with  INFUSION CHAIR 17   [facility] ([facility])    [facility]  [address]  [place] [address]   [phone]            Nov 08, 2017 10:00 AM CST   Return Visit with Shayne Roberts MD   [facility] ([facility])    [facility]  [address]  [place] [address]   [phone]              Who to contact     If you have questions or need follow up information about today's clinic visit or your schedule please contact [facility] directly at [phone].  Normal or non-critical lab and imaging results will be communicated to you by MyChart, letter or phone within 4 business days after the clinic has received the results. If you do not hear from us within 7 days, please contact the clinic through clipsynchart or phone. If you have a critical or abnormal lab result, we will notify you by phone as soon as possible.  Submit refill requests through WiziShop or call your pharmacy and they will forward the refill request to us.  "Please allow 3 business days for your refill to be completed.          Additional Information About Your Visit        MyChart Information     Outrighthart lets you send messages to your doctor, view your test results, renew your prescriptions, schedule appointments and more. To sign up, go to [email or web address] . Click on \"Log in\" on the left side of the screen, which will take you to the Welcome page. Then click on \"Sign up Now\" on the right side of the page.     You will be asked to enter the access code listed below, as well as some personal information. Please follow the directions to create your username and password.     Your access code is: [email or web address]  Expires: 2017 10:24 AM     Your access code will  in 90 days. If you need help or a new code, please call your [place] clinic or [phone].        Care EveryWhere ID     This is your [facility] EveryWhere ID. This could be used by other organizations to access your [place] medical records  [id number]        Your Vitals Were     Pulse Temperature Respirations Height BMI (Body Mass Index)       77 97.7  F (36.5  C) (Oral) 18 1.676 m (5' 5.98\") 21.86 kg/m2        Blood Pressure from Last 3 Encounters:   10/25/17 132/86   10/18/17 123/77   10/11/17 144/86    Weight from Last 3 Encounters:   10/25/17 61.4 kg (135 lb 6.4 oz)   10/18/17 63 kg (139 lb)   10/11/17 62.6 kg (138 lb)              We Performed the Following     CBC with platelets differential     INR        Primary Care Provider Office Phone # Fax #    Addy Frias [phone] [address]  [place] [address]        Equal Access to Services     [facility] Center[facility] AH: Hadmicaela Galloway, wajanetda brenda, qaybta kaalmada alonso, hung sadler. So [facility] [phone].    ATENCIÓN: Si habla español, tiene a barlow disposición servicios gratuitos de asistencia lingüística. Kadie al [phone].    We comply with applicable " federal civil rights laws and [place] laws. We do not discriminate on the basis of race, color, national origin, age, disability, sex, sexual orientation, or gender identity.            Thank you!     Thank you for choosing [facility]  for your care. Our goal is always to provide you with excellent care. Hearing back from our patients is one way we can continue to improve our services. Please take a few minutes to complete the written survey that you may receive in the mail after your visit with us. Thank you!             Your Updated Medication List - Protect others around you: Learn how to safely use, store and throw away your medicines at www.disposemymeds.org.          This list is accurate as of: 10/25/17 11:54 AM.  Always use your most recent med list.                   Brand Name Dispense Instructions for use Diagnosis    acyclovir 400 MG tablet    ZOVIRAX    60 tablet    Take 1 tablet (400 mg) by mouth 2 times daily Viral Prophylaxis.    Multiple myeloma not having achieved remission (H)       CALCIUM CITRATE + PO      Take 2,000 mg by mouth daily 2 tabs        carboxymethylcellulose 0.5 % Soln ophthalmic solution    REFRESH PLUS     1 drop 4 times daily        CLARINEX PO      Take by mouth daily Taking claritin        COMPAZINE PO      Take 10 mg by mouth daily as needed        cycloSPORINE 0.05 % ophthalmic emulsion    RESTASIS     Place 1 drop into both eyes every 12 hours        DAILY MULTIVITAMIN PO      Take 1 tablet by mouth daily.    Routine general medical examination at a health care facility       * dexamethasone 4 MG tablet    DECADRON    28 tablet    Take 20mg (5 tablets) PO every week on the morning of velcade injection. Then take 1 tablet (4mg) by mouth for two days after darzalex.    Multiple myeloma not having achieved remission (H)       * dexamethasone 4 MG tablet    DECADRON    28 tablet    Take 20mg (5 tablets) by mouth every week on the morning of  velcade injection.    Multiple myeloma not having achieved remission (H)       * dexamethasone 4 MG tablet    DECADRON    28 tablet    Take 20mg (5 tablets) by mouth every week on the morning of velcade injection.    Multiple myeloma not having achieved remission (H)       erythromycin ophthalmic ointment    ROMYCIN     Place 1 Application into both eyes At Bedtime        GENTLE STOOL SOFTENER PO      Take 100 mg by mouth daily        lidocaine-prilocaine cream    EMLA    30 g    Apply topically as needed for moderate pain Apply dollop size amount to port site 30-60 min prior to accessing    Multiple myeloma not having achieved remission (H)       LORazepam 0.5 MG tablet    ATIVAN    30 tablet    Take 1 tablet (0.5 mg) by mouth every 8 hours as needed for anxiety    Multiple myeloma not having achieved remission (H)       metoprolol 50 MG 24 hr tablet    TOPROL XL    270 tablet    Take 2 tablets (100mg) in the morning and 1 tablet (50mg) in the evening by mouth daily    Paroxysmal atrial fibrillation (H)       oxyCODONE 15 MG IR tablet    ROXICODONE    90 tablet    Take 1 tablet (15 mg) by mouth every 8 hours as needed for pain maximum 4 tablet(s) per day    Multiple myeloma not having achieved remission (H)       polyethylene glycol powder    MIRALAX/GLYCOLAX     Take 1 capful by mouth daily as needed    Bilateral leg edema       timolol 0.25 % ophthalmic solution    TIMOPTIC     Place 1 drop into the right eye 2 times daily        TYLENOL PO      Take 500 mg by mouth every 6 hours as needed for mild pain or fever        UNABLE TO FIND      MEDICATION NAME: Fresh Coat eye drops        VITAMIN D3 PO      Take 1,000 Units by mouth daily        warfarin 4 MG tablet    COUMADIN    110 tablet    TAKE ONE AND ONE-HALF TABLETS BY MOUTH ON MONDAY, WEDNESDAY, AND FRIDAY AND ONE TABLET THE OTHER DAYS OF THE WEEK    Paroxysmal atrial fibrillation (H), Long-term (current) use of anticoagulants       ZOMETA IV      Inject into  the vein every 30 days Every 3 month dosing        * Notice:  This list has 3 medication(s) that are the same as other medications prescribed for you. Read the directions carefully, and ask your doctor or other care provider to review them with you.

## 2017-10-25 NOTE — PROGRESS NOTES
Infusion Nursing Note:  Amira Arreola presents today for C6D8 Velcade.    Patient seen by provider today: No   present during visit today: Not Applicable.    Note: N/A.    Intravenous Access:  Labs drawn without difficulty.  Implanted Port.    Treatment Conditions:  Lab Results   Component Value Date    HGB 12.9 10/25/2017     Lab Results   Component Value Date    WBC 7.4 10/25/2017      Lab Results   Component Value Date    ANEU 6.7 10/25/2017     Lab Results   Component Value Date     10/25/2017      Results reviewed, labs MET treatment parameters, ok to proceed with treatment.        Post Infusion Assessment:  Patient tolerated injection without incident.  Site patent and intact, free from redness, edema or discomfort.  No evidence of extravasations.    Discharge Plan:   Discharge instructions reviewed with: Patient.  Patient and/or family verbalized understanding of discharge instructions and all questions answered.  Copy of AVS reviewed with patient and/or family.  Patient will return 11/1/17 for next appointment.  Patient discharged in stable condition accompanied by: self.  Departure Mode: Ambulatory.    SHELLIE Castro RN

## 2017-10-25 NOTE — TELEPHONE ENCOUNTER
----- Message from Addy Frias MD sent at 10/25/2017 11:26 AM CDT -----  Regarding: FW: INR      ----- Message -----     From: David Kearney RN     Sent: 10/25/2017  10:47 AM       To: Addy Frias MD  Subject: CHAN Collier has chemo infusions weekly at the [facility] outpatient infusion.  We would gladly draw her weekly INR's for you if you enter standing orders in epic.     Thanks,   David HENSLEY

## 2017-10-25 NOTE — TELEPHONE ENCOUNTER
David (chemo infusion nurse),  Please see below  Standing INR order placed  Thanks,  Michelle BENITEZ RN

## 2017-10-25 NOTE — TELEPHONE ENCOUNTER
Dr Frias,   Please see below messages  Pended standing order for INR if you approve  Thanks,  Michelle BENITEZ RN

## 2017-10-28 ENCOUNTER — TELEPHONE (OUTPATIENT)
Dept: INFUSION THERAPY | Facility: CLINIC | Age: 82
End: 2017-10-28

## 2017-11-01 ENCOUNTER — ANTICOAGULATION THERAPY VISIT (OUTPATIENT)
Dept: FAMILY MEDICINE | Facility: CLINIC | Age: 82
End: 2017-11-01
Payer: COMMERCIAL

## 2017-11-01 ENCOUNTER — INFUSION THERAPY VISIT (OUTPATIENT)
Dept: INFUSION THERAPY | Facility: CLINIC | Age: 82
End: 2017-11-01
Attending: INTERNAL MEDICINE
Payer: MEDICARE

## 2017-11-01 ENCOUNTER — HOSPITAL ENCOUNTER (OUTPATIENT)
Facility: CLINIC | Age: 82
Setting detail: SPECIMEN
Discharge: HOME | End: 2017-11-01
Attending: INTERNAL MEDICINE | Admitting: INTERNAL MEDICINE
Payer: MEDICARE

## 2017-11-01 VITALS
DIASTOLIC BLOOD PRESSURE: 65 MMHG | RESPIRATION RATE: 16 BRPM | WEIGHT: 134.4 LBS | OXYGEN SATURATION: 98 % | SYSTOLIC BLOOD PRESSURE: 116 MMHG | HEART RATE: 74 BPM | BODY MASS INDEX: 21.6 KG/M2 | HEIGHT: 66 IN | TEMPERATURE: 97.7 F

## 2017-11-01 DIAGNOSIS — C90.00 MULTIPLE MYELOMA NOT HAVING ACHIEVED REMISSION (H): ICD-10-CM

## 2017-11-01 DIAGNOSIS — Z79.01 LONG-TERM (CURRENT) USE OF ANTICOAGULANTS: ICD-10-CM

## 2017-11-01 DIAGNOSIS — I48.0 PAROXYSMAL ATRIAL FIBRILLATION (H): Primary | ICD-10-CM

## 2017-11-01 LAB
BASOPHILS # BLD AUTO: 0 10E9/L (ref 0–0.2)
BASOPHILS NFR BLD AUTO: 0.1 %
DIFFERENTIAL METHOD BLD: ABNORMAL
EOSINOPHIL # BLD AUTO: 0 10E9/L (ref 0–0.7)
EOSINOPHIL NFR BLD AUTO: 0.1 %
ERYTHROCYTE [DISTWIDTH] IN BLOOD BY AUTOMATED COUNT: 14 % (ref 10–15)
HCT VFR BLD AUTO: 37.3 % (ref 35–47)
HGB BLD-MCNC: 12.7 G/DL (ref 11.7–15.7)
IMM GRANULOCYTES # BLD: 0 10E9/L (ref 0–0.4)
IMM GRANULOCYTES NFR BLD: 0.3 %
INR PPP: 2.22 (ref 0.86–1.14)
LYMPHOCYTES # BLD AUTO: 0.4 10E9/L (ref 0.8–5.3)
LYMPHOCYTES NFR BLD AUTO: 5.4 %
MCH RBC QN AUTO: 34.7 PG (ref 26.5–33)
MCHC RBC AUTO-ENTMCNC: 34 G/DL (ref 31.5–36.5)
MCV RBC AUTO: 102 FL (ref 78–100)
MONOCYTES # BLD AUTO: 0.1 10E9/L (ref 0–1.3)
MONOCYTES NFR BLD AUTO: 1.7 %
NEUTROPHILS # BLD AUTO: 7.2 10E9/L (ref 1.6–8.3)
NEUTROPHILS NFR BLD AUTO: 92.4 %
NRBC # BLD AUTO: 0 10*3/UL
NRBC BLD AUTO-RTO: 0 /100
PLATELET # BLD AUTO: 145 10E9/L (ref 150–450)
RBC # BLD AUTO: 3.66 10E12/L (ref 3.8–5.2)
WBC # BLD AUTO: 7.8 10E9/L (ref 4–11)

## 2017-11-01 PROCEDURE — 96375 TX/PRO/DX INJ NEW DRUG ADDON: CPT

## 2017-11-01 PROCEDURE — 85025 COMPLETE CBC W/AUTO DIFF WBC: CPT | Performed by: INTERNAL MEDICINE

## 2017-11-01 PROCEDURE — 25000132 ZZH RX MED GY IP 250 OP 250 PS 637: Mod: GY | Performed by: INTERNAL MEDICINE

## 2017-11-01 PROCEDURE — 85610 PROTHROMBIN TIME: CPT | Performed by: INTERNAL MEDICINE

## 2017-11-01 PROCEDURE — 96401 CHEMO ANTI-NEOPL SQ/IM: CPT

## 2017-11-01 PROCEDURE — 25000128 H RX IP 250 OP 636: Performed by: INTERNAL MEDICINE

## 2017-11-01 PROCEDURE — 96415 CHEMO IV INFUSION ADDL HR: CPT

## 2017-11-01 PROCEDURE — 96413 CHEMO IV INFUSION 1 HR: CPT

## 2017-11-01 PROCEDURE — 99207 ZZC NO CHARGE NURSE ONLY: CPT | Performed by: INTERNAL MEDICINE

## 2017-11-01 PROCEDURE — A9270 NON-COVERED ITEM OR SERVICE: HCPCS | Mod: GY | Performed by: INTERNAL MEDICINE

## 2017-11-01 RX ORDER — ACETAMINOPHEN 325 MG/1
650 TABLET ORAL ONCE
Status: COMPLETED | OUTPATIENT
Start: 2017-11-01 | End: 2017-11-01

## 2017-11-01 RX ORDER — HEPARIN SODIUM (PORCINE) LOCK FLUSH IV SOLN 100 UNIT/ML 100 UNIT/ML
5 SOLUTION INTRAVENOUS EVERY 8 HOURS
Status: DISCONTINUED | OUTPATIENT
Start: 2017-11-01 | End: 2017-11-01 | Stop reason: HOSPADM

## 2017-11-01 RX ORDER — DIPHENHYDRAMINE HCL 25 MG
50 CAPSULE ORAL ONCE
Status: COMPLETED | OUTPATIENT
Start: 2017-11-01 | End: 2017-11-01

## 2017-11-01 RX ADMIN — BORTEZOMIB 2.2 MG: 3.5 INJECTION, POWDER, LYOPHILIZED, FOR SOLUTION INTRAVENOUS; SUBCUTANEOUS at 12:11

## 2017-11-01 RX ADMIN — DARATUMUMAB 1000 MG: 100 INJECTION, SOLUTION, CONCENTRATE INTRAVENOUS at 12:07

## 2017-11-01 RX ADMIN — DIPHENHYDRAMINE HYDROCHLORIDE 50 MG: 25 CAPSULE ORAL at 11:29

## 2017-11-01 RX ADMIN — DEXAMETHASONE SODIUM PHOSPHATE 12 MG: 10 INJECTION, SOLUTION INTRAMUSCULAR; INTRAVENOUS at 11:30

## 2017-11-01 RX ADMIN — ACETAMINOPHEN 650 MG: 325 TABLET ORAL at 11:29

## 2017-11-01 RX ADMIN — SODIUM CHLORIDE 250 ML: 9 INJECTION, SOLUTION INTRAVENOUS at 11:33

## 2017-11-01 RX ADMIN — SODIUM CHLORIDE, PRESERVATIVE FREE 5 ML: 5 INJECTION INTRAVENOUS at 15:27

## 2017-11-01 ASSESSMENT — PAIN SCALES - GENERAL: PAINLEVEL: NO PAIN (0)

## 2017-11-01 NOTE — MR AVS SNAPSHOT
After Visit Summary   11/1/2017    Amira Arreola    MRN: [id number]           Patient Information     Date Of Birth          7/17/1932        Visit Information        Provider Department      11/1/2017 10:30 AM  INFUSION CHAIR 7 [facility]        Today's Diagnoses     Paroxysmal atrial fibrillation (H)    -  1    Multiple myeloma not having achieved remission (H)          Care Instructions    Per INR clinic: take 6 mg of coumadin on Monday's and Friday's, and 4 mg Tuesday, Wednesday, Thursday, Saturday, and Sunday.   Please Call the INR Clinic after each INR draw for future dosing if the INR clinic is unable to get a hold of you. Their number is [phone] and ask for the anticoagulation clinic.           Follow-ups after your visit        Your next 10 appointments already scheduled     Nov 08, 2017  9:30 AM CST   Level 2 with  INFUSION CHAIR 17   [facility] ([facility])    [facility]  [address]  [facility] [address] [phone]            Nov 08, 2017 10:00 AM CST   Return Visit with Shayne Roberts MD   [facility] ([facility])    [facility]  [address]  [facility] [address] [phone]              Who to contact     If you have questions or need follow up information about today's clinic visit or your schedule please contact [facility] directly at [phone].  Normal or non-critical lab and imaging results will be communicated to you by MyChart, letter or phone within 4 business days after the clinic has received the results. If you do not hear from us within 7 days, please contact the clinic through MyChart or phone. If you have a critical or abnormal lab result, we will notify you by phone as soon as possible.  Submit refill requests through Tripwarehart or call your  "pharmacy and they will forward the refill request to us. Please allow 3 business days for your refill to be completed.          Additional Information About Your Visit        MyChart Information     Vizury lets you send messages to your doctor, view your test results, renew your prescriptions, schedule appointments and more. To sign up, go to [email or web address] . Click on \"Log in\" on the left side of the screen, which will take you to the Welcome page. Then click on \"Sign up Now\" on the right side of the page.     You will be asked to enter the access code listed below, as well as some personal information. Please follow the directions to create your username and password.     Your access code is: [email or web address]  Expires: 2017 10:24 AM     Your access code will  in 90 days. If you need help or a new code, please call your [place] clinic or [phone].        Care EveryWhere ID     This is your [facility] EveryWhere ID. This could be used by other organizations to access your [place] medical records  [id number]        Your Vitals Were     Pulse Temperature Respirations Height Pulse Oximetry BMI (Body Mass Index)    74 97.7  F (36.5  C) (Oral) 16 1.676 m (5' 5.98\") 98% 21.7 kg/m2       Blood Pressure from Last 3 Encounters:   17 116/65   10/25/17 132/86   10/18/17 123/77    Weight from Last 3 Encounters:   17 61 kg (134 lb 6.4 oz)   10/25/17 61.4 kg (135 lb 6.4 oz)   10/18/17 63 kg (139 lb)              We Performed the Following     CBC with platelets differential     INR        Primary Care Provider Office Phone # Fax #    Addy Frias [phone] [address]  [facility] [address]        Equal Access to Services     HARINI ZELAYA : Gissel Galloway, wajanetda luqadaha, qaybta kahung kay . [facility] [phone].    ATENCIÓN: Si habla español, tiene a barlow disposición servicios gratuitos de " asistencia lingüística. Kadie al [phone].    We comply with applicable federal civil rights laws and [place] laws. We do not discriminate on the basis of race, color, national origin, age, disability, sex, sexual orientation, or gender identity.            Thank you!     Thank you for choosing [facility]  for your care. Our goal is always to provide you with excellent care. Hearing back from our patients is one way we can continue to improve our services. Please take a few minutes to complete the written survey that you may receive in the mail after your visit with us. Thank you!             Your Updated Medication List - Protect others around you: Learn how to safely use, store and throw away your medicines at www.disposemymeds.org.          This list is accurate as of: 11/1/17  3:16 PM.  Always use your most recent med list.                   Brand Name Dispense Instructions for use Diagnosis    acyclovir 400 MG tablet    ZOVIRAX    60 tablet    Take 1 tablet (400 mg) by mouth 2 times daily Viral Prophylaxis.    Multiple myeloma not having achieved remission (H)       CALCIUM CITRATE + PO      Take 2,000 mg by mouth daily 2 tabs        carboxymethylcellulose 0.5 % Soln ophthalmic solution    REFRESH PLUS     1 drop 4 times daily        CLARINEX PO      Take by mouth daily Taking claritin        COMPAZINE PO      Take 10 mg by mouth daily as needed        cycloSPORINE 0.05 % ophthalmic emulsion    RESTASIS     Place 1 drop into both eyes every 12 hours        DAILY MULTIVITAMIN PO      Take 1 tablet by mouth daily.    Routine general medical examination at a health care facility       * dexamethasone 4 MG tablet    DECADRON    28 tablet    Take 20mg (5 tablets) PO every week on the morning of velcade injection. Then take 1 tablet (4mg) by mouth for two days after darzalex.    Multiple myeloma not having achieved remission (H)       * dexamethasone 4 MG tablet    DECADRON     28 tablet    Take 20mg (5 tablets) by mouth every week on the morning of velcade injection.    Multiple myeloma not having achieved remission (H)       * dexamethasone 4 MG tablet    DECADRON    28 tablet    Take 20mg (5 tablets) by mouth every week on the morning of velcade injection.    Multiple myeloma not having achieved remission (H)       erythromycin ophthalmic ointment    ROMYCIN     Place 1 Application into both eyes At Bedtime        GENTLE STOOL SOFTENER PO      Take 100 mg by mouth daily        lidocaine-prilocaine cream    EMLA    30 g    Apply topically as needed for moderate pain Apply dollop size amount to port site 30-60 min prior to accessing    Multiple myeloma not having achieved remission (H)       LORazepam 0.5 MG tablet    ATIVAN    30 tablet    Take 1 tablet (0.5 mg) by mouth every 8 hours as needed for anxiety    Multiple myeloma not having achieved remission (H)       metoprolol 50 MG 24 hr tablet    TOPROL XL    270 tablet    Take 2 tablets (100mg) in the morning and 1 tablet (50mg) in the evening by mouth daily    Paroxysmal atrial fibrillation (H)       oxyCODONE 15 MG IR tablet    ROXICODONE    90 tablet    Take 1 tablet (15 mg) by mouth every 8 hours as needed for pain maximum 4 tablet(s) per day    Multiple myeloma not having achieved remission (H)       polyethylene glycol powder    MIRALAX/GLYCOLAX     Take 1 capful by mouth daily as needed    Bilateral leg edema       timolol 0.25 % ophthalmic solution    TIMOPTIC     Place 1 drop into the right eye 2 times daily        TYLENOL PO      Take 500 mg by mouth every 6 hours as needed for mild pain or fever        UNABLE TO FIND      MEDICATION NAME: Fresh Coat eye drops        VITAMIN D3 PO      Take 1,000 Units by mouth daily        warfarin 4 MG tablet    COUMADIN    110 tablet    TAKE ONE AND ONE-HALF TABLETS BY MOUTH ON MONDAY, WEDNESDAY, AND FRIDAY AND ONE TABLET THE OTHER DAYS OF THE WEEK    Paroxysmal atrial fibrillation (H),  Long-term (current) use of anticoagulants       ZOMETA IV      Inject into the vein every 30 days Every 3 month dosing        * Notice:  This list has 3 medication(s) that are the same as other medications prescribed for you. Read the directions carefully, and ask your doctor or other care provider to review them with you.

## 2017-11-01 NOTE — PROGRESS NOTES
ANTICOAGULATION FOLLOW-UP CLINIC VISIT    Patient Name:  Amira Arreola  Date:  11/1/2017  Contact Type:  Telephone/ [place] from [facility] infusion center    SUBJECTIVE:     Patient Findings     Positives No Problem Findings           OBJECTIVE    INR   Date Value Ref Range Status   11/01/2017 2.22 (H) 0.86 - 1.14 Final       ASSESSMENT / PLAN  INR assessment THER    Recheck INR In: 1 WEEK    INR Location Clinic      Anticoagulation Summary as of 11/1/2017     INR goal 2.0-3.0   Today's INR    Maintenance plan 6 mg (4 mg x 1.5) on Mon, Fri; 4 mg (4 mg x 1) all other days   Full instructions 6 mg on Mon, Fri; 4 mg all other days   Weekly total 32 mg   No change documented Dayana Barrera RN   Plan last modified Dayana Barrera RN (9/14/2017)   Next INR check 11/8/2017   Target end date Indefinite    Indications   Long-term (current) use of anticoagulants [Z79.01] [Z79.01]  Atrial fibrillation (H) [I48.91] (Resolved) [I48.91]         Anticoagulation Episode Summary     INR check location     Preferred lab     Send INR reminders to [facility]    Comments patient have standing INR order to be draw at infusion visit.  advise to call [facility] when have INR draw for dosing instruction.  patient can be reach at home phone [phone]      Anticoagulation Care Providers     Provider Role Specialty Phone number    Addy Frias MD [facility] Internal Medicine [phone]            See the Encounter Report to view Anticoagulation Flowsheet and Dosing Calendar (Go to Encounters tab in chart review, and find the Anticoagulation Therapy Visit)    Dosage adjustment made based on physician directed care plan.    [facility] infusion center call with INR result of 2.2.  Per patient reports total of 32 mg in the past week.   Advised to continue with 6 mg on Mon, Fri, 4 mg all other days = 32 mg weekly.  Recheck next week at next infusion appt.  Advise that patient to call clinic after each INR draw for dosing instruction.       Dayana Barrera, RN

## 2017-11-01 NOTE — PATIENT INSTRUCTIONS
Per INR clinic: take 6 mg of coumadin on Monday's and Friday's, and 4 mg Tuesday, Wednesday, Thursday, Saturday, and Sunday.   Please Call the INR Clinic after each INR draw for future dosing if the INR clinic is unable to get a hold of you. Their number is [phone] and ask for the anticoagulation clinic.

## 2017-11-01 NOTE — PROGRESS NOTES
Infusion Nursing Note:  Amira Arreola presents today for C6D15 velcade/daratumumab.    Patient seen by provider today: No   present during visit today: Not Applicable.    Note: N/A.    Intravenous Access:  Labs drawn without difficulty.  Implanted Port.    Treatment Conditions:  Lab Results   Component Value Date    HGB 12.7 11/01/2017     Lab Results   Component Value Date    WBC 7.8 11/01/2017      Lab Results   Component Value Date    ANEU 7.2 11/01/2017     Lab Results   Component Value Date     11/01/2017      Results reviewed, labs MET treatment parameters, ok to proceed with treatment.        Post Infusion Assessment:  Patient tolerated infusion without incident.  Blood return noted pre and post infusion.  Site patent and intact, free from redness, edema or discomfort.  No evidence of extravasations.  Access discontinued per protocol.    Discharge Plan:   Discharge instructions reviewed with: Patient.  Patient and/or family verbalized understanding of discharge instructions and all questions answered.  Copy of AVS reviewed with patient and/or family.  Patient will return 11/8/17 for next appointment.  Patient discharged in stable condition accompanied by: self.  Departure Mode: Ambulatory.    Ladonna Boo RN

## 2017-11-01 NOTE — TELEPHONE ENCOUNTER
patient have standing INR order to be draw at infusion visit.  advise to call [facility] when have INR draw for dosing instruction.  patient can be reach at home phone [phone]    Dayana Barrera RN

## 2017-11-04 DIAGNOSIS — Z79.01 LONG-TERM (CURRENT) USE OF ANTICOAGULANTS: ICD-10-CM

## 2017-11-04 DIAGNOSIS — I48.0 PAROXYSMAL ATRIAL FIBRILLATION (H): ICD-10-CM

## 2017-11-07 RX ORDER — WARFARIN SODIUM 4 MG/1
TABLET ORAL
Qty: 110 TABLET | Refills: 0 | Status: SHIPPED | OUTPATIENT
Start: 2017-11-07 | End: 2018-03-13

## 2017-11-08 ENCOUNTER — ONCOLOGY VISIT (OUTPATIENT)
Dept: ONCOLOGY | Facility: CLINIC | Age: 82
End: 2017-11-08
Attending: INTERNAL MEDICINE
Payer: MEDICARE

## 2017-11-08 ENCOUNTER — HOSPITAL ENCOUNTER (OUTPATIENT)
Facility: CLINIC | Age: 82
Setting detail: SPECIMEN
Discharge: HOME | End: 2017-11-08
Attending: INTERNAL MEDICINE | Admitting: INTERNAL MEDICINE
Payer: MEDICARE

## 2017-11-08 ENCOUNTER — INFUSION THERAPY VISIT (OUTPATIENT)
Dept: INFUSION THERAPY | Facility: CLINIC | Age: 82
End: 2017-11-08
Attending: INTERNAL MEDICINE
Payer: MEDICARE

## 2017-11-08 ENCOUNTER — ANTICOAGULATION THERAPY VISIT (OUTPATIENT)
Dept: NURSING | Facility: CLINIC | Age: 82
End: 2017-11-08

## 2017-11-08 VITALS
RESPIRATION RATE: 20 BRPM | OXYGEN SATURATION: 98 % | BODY MASS INDEX: 21.61 KG/M2 | SYSTOLIC BLOOD PRESSURE: 134 MMHG | DIASTOLIC BLOOD PRESSURE: 81 MMHG | WEIGHT: 133.8 LBS | HEART RATE: 80 BPM | TEMPERATURE: 97.7 F

## 2017-11-08 VITALS
HEART RATE: 80 BPM | SYSTOLIC BLOOD PRESSURE: 134 MMHG | BODY MASS INDEX: 21.61 KG/M2 | OXYGEN SATURATION: 98 % | WEIGHT: 133.8 LBS | RESPIRATION RATE: 20 BRPM | DIASTOLIC BLOOD PRESSURE: 81 MMHG | TEMPERATURE: 97.7 F

## 2017-11-08 DIAGNOSIS — Z95.828 PORTACATH IN PLACE: ICD-10-CM

## 2017-11-08 DIAGNOSIS — C79.51 CANCER, METASTATIC TO BONE (H): ICD-10-CM

## 2017-11-08 DIAGNOSIS — C90.00 MULTIPLE MYELOMA NOT HAVING ACHIEVED REMISSION (H): Primary | ICD-10-CM

## 2017-11-08 DIAGNOSIS — I48.0 PAROXYSMAL ATRIAL FIBRILLATION (H): ICD-10-CM

## 2017-11-08 DIAGNOSIS — Z79.01 LONG-TERM (CURRENT) USE OF ANTICOAGULANTS: ICD-10-CM

## 2017-11-08 LAB
ALBUMIN SERPL-MCNC: 3.7 G/DL (ref 3.4–5)
BASOPHILS # BLD AUTO: 0 10E9/L (ref 0–0.2)
BASOPHILS NFR BLD AUTO: 0.1 %
CALCIUM SERPL-MCNC: 9.4 MG/DL (ref 8.5–10.1)
CREAT SERPL-MCNC: 0.59 MG/DL (ref 0.52–1.04)
DIFFERENTIAL METHOD BLD: ABNORMAL
EOSINOPHIL # BLD AUTO: 0 10E9/L (ref 0–0.7)
EOSINOPHIL NFR BLD AUTO: 0 %
ERYTHROCYTE [DISTWIDTH] IN BLOOD BY AUTOMATED COUNT: 14 % (ref 10–15)
GFR SERPL CREATININE-BSD FRML MDRD: >90 ML/MIN/1.7M2
HCT VFR BLD AUTO: 38.1 % (ref 35–47)
HGB BLD-MCNC: 13.2 G/DL (ref 11.7–15.7)
IMM GRANULOCYTES # BLD: 0 10E9/L (ref 0–0.4)
IMM GRANULOCYTES NFR BLD: 0.1 %
INR PPP: 2.51 (ref 0.86–1.14)
LYMPHOCYTES # BLD AUTO: 0.5 10E9/L (ref 0.8–5.3)
LYMPHOCYTES NFR BLD AUTO: 6.4 %
MCH RBC QN AUTO: 35 PG (ref 26.5–33)
MCHC RBC AUTO-ENTMCNC: 34.6 G/DL (ref 31.5–36.5)
MCV RBC AUTO: 101 FL (ref 78–100)
MONOCYTES # BLD AUTO: 0.1 10E9/L (ref 0–1.3)
MONOCYTES NFR BLD AUTO: 1.2 %
NEUTROPHILS # BLD AUTO: 7 10E9/L (ref 1.6–8.3)
NEUTROPHILS NFR BLD AUTO: 92.2 %
NRBC # BLD AUTO: 0 10*3/UL
NRBC BLD AUTO-RTO: 0 /100
PLATELET # BLD AUTO: 135 10E9/L (ref 150–450)
RBC # BLD AUTO: 3.77 10E12/L (ref 3.8–5.2)
WBC # BLD AUTO: 7.6 10E9/L (ref 4–11)

## 2017-11-08 PROCEDURE — 85025 COMPLETE CBC W/AUTO DIFF WBC: CPT | Performed by: INTERNAL MEDICINE

## 2017-11-08 PROCEDURE — 82040 ASSAY OF SERUM ALBUMIN: CPT | Performed by: INTERNAL MEDICINE

## 2017-11-08 PROCEDURE — 99211 OFF/OP EST MAY X REQ PHY/QHP: CPT

## 2017-11-08 PROCEDURE — 25000128 H RX IP 250 OP 636: Performed by: INTERNAL MEDICINE

## 2017-11-08 PROCEDURE — 99214 OFFICE O/P EST MOD 30 MIN: CPT | Performed by: INTERNAL MEDICINE

## 2017-11-08 PROCEDURE — 36415 COLL VENOUS BLD VENIPUNCTURE: CPT | Performed by: INTERNAL MEDICINE

## 2017-11-08 PROCEDURE — 85610 PROTHROMBIN TIME: CPT | Performed by: INTERNAL MEDICINE

## 2017-11-08 PROCEDURE — 96375 TX/PRO/DX INJ NEW DRUG ADDON: CPT

## 2017-11-08 PROCEDURE — 96374 THER/PROPH/DIAG INJ IV PUSH: CPT

## 2017-11-08 PROCEDURE — 96401 CHEMO ANTI-NEOPL SQ/IM: CPT

## 2017-11-08 PROCEDURE — 82565 ASSAY OF CREATININE: CPT | Performed by: INTERNAL MEDICINE

## 2017-11-08 PROCEDURE — 82310 ASSAY OF CALCIUM: CPT | Performed by: INTERNAL MEDICINE

## 2017-11-08 RX ORDER — LORAZEPAM 0.5 MG/1
0.5 TABLET ORAL EVERY 8 HOURS PRN
Qty: 30 TABLET | Refills: 3 | Status: SHIPPED | OUTPATIENT
Start: 2017-11-08 | End: 2018-01-31

## 2017-11-08 RX ORDER — OXYCODONE HYDROCHLORIDE 15 MG/1
15 TABLET ORAL EVERY 8 HOURS PRN
Qty: 60 TABLET | Refills: 0 | Status: SHIPPED | OUTPATIENT
Start: 2017-11-08 | End: 2017-12-06

## 2017-11-08 RX ORDER — ZOLEDRONIC ACID 0.04 MG/ML
4 INJECTION, SOLUTION INTRAVENOUS ONCE
Status: CANCELLED | OUTPATIENT
Start: 2017-11-15 | End: 2017-11-15

## 2017-11-08 RX ORDER — HEPARIN SODIUM (PORCINE) LOCK FLUSH IV SOLN 100 UNIT/ML 100 UNIT/ML
500 SOLUTION INTRAVENOUS EVERY 8 HOURS
Status: CANCELLED
Start: 2017-11-08

## 2017-11-08 RX ORDER — HEPARIN SODIUM (PORCINE) LOCK FLUSH IV SOLN 100 UNIT/ML 100 UNIT/ML
500 SOLUTION INTRAVENOUS EVERY 8 HOURS
Status: DISCONTINUED | OUTPATIENT
Start: 2017-11-08 | End: 2017-11-08 | Stop reason: HOSPADM

## 2017-11-08 RX ORDER — ZOLEDRONIC ACID 0.04 MG/ML
4 INJECTION, SOLUTION INTRAVENOUS ONCE
Status: COMPLETED | OUTPATIENT
Start: 2017-11-08 | End: 2017-11-08

## 2017-11-08 RX ADMIN — ZOLEDRONIC ACID 4 MG: 0.04 INJECTION, SOLUTION INTRAVENOUS at 10:26

## 2017-11-08 RX ADMIN — BORTEZOMIB 2.2 MG: 3.5 INJECTION, POWDER, LYOPHILIZED, FOR SOLUTION INTRAVENOUS; SUBCUTANEOUS at 10:51

## 2017-11-08 RX ADMIN — SODIUM CHLORIDE, PRESERVATIVE FREE 500 UNITS: 5 INJECTION INTRAVENOUS at 10:51

## 2017-11-08 ASSESSMENT — PAIN SCALES - GENERAL
PAINLEVEL: NO PAIN (0)
PAINLEVEL: NO PAIN (0)
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
## 2017-11-08 NOTE — MR AVS SNAPSHOT
Amira IVY Arreola   11/8/2017   Anticoagulation Therapy Visit    Description:  85 year old female   Provider:  Addy Frias MD   Department:  Ec Nurse           INR as of 11/8/2017     Today's INR 2.51      Anticoagulation Summary as of 11/8/2017     INR goal 2.0-3.0   Today's INR 2.51   Full instructions 6 mg on Mon, Fri; 4 mg all other days   Next INR check 11/15/2017    Indications   Long-term (current) use of anticoagulants [Z79.01] [Z79.01]  Atrial fibrillation (H) [I48.91] (Resolved) [I48.91]         Description     Patient call report INR done at infusion center today - therapeutic at 2.5. Advised to continue with 6 mg on Mon, Fri;  4 mg all other days = 32 mg weekly.  Recheck in 1 week at infusion center and will call for dosing instruction.        Contact Numbers     Clinic Number:         November 2017 Details    Sun Mon Tue Wed Thu Fri Sat        1               2               3               4                 5               6               7               8      4 mg   See details      9      4 mg         10      6 mg         11      4 mg           12      4 mg         13      6 mg         14      4 mg         15            16               17               18                 19               20               21               22               23               24               25                 26               27               28               29               30                  Date Details   11/08 This INR check       Date of next INR:  11/15/2017         How to take your warfarin dose     To take:  4 mg Take 1 of the 4 mg tablets.    To take:  6 mg Take 1.5 of the 4 mg tablets.

## 2017-11-08 NOTE — PATIENT INSTRUCTIONS
Continue chemotherapy.  Scheduled/aida  Follow up in 1 month.  Scheduled/aida    AVS printed & given to patient/aida

## 2017-11-08 NOTE — PROGRESS NOTES
"Oncology Rooming Note    November 8, 2017 9:42 AM   Amira Arreola is a 85 year old female who presents for:    Chief Complaint   Patient presents with     Oncology Clinic Visit     Multiple myeloma not having achieved remission      Initial Vitals: /81 (BP Location: Left arm)  Pulse 80  Temp 97.7  F (36.5  C) (Oral)  Resp 20  Wt 60.7 kg (133 lb 12.8 oz)  SpO2 98%  BMI 21.61 kg/m2 Estimated body mass index is 21.61 kg/(m^2) as calculated from the following:    Height as of 11/1/17: 1.676 m (5' 5.98\").    Weight as of this encounter: 60.7 kg (133 lb 12.8 oz). Body surface area is 1.68 meters squared.  Data Unavailable Comment: Data Unavailable   No LMP recorded. Patient is postmenopausal.  Allergies reviewed: Yes  Medications reviewed: Yes    Medications: MEDICATION REFILL NEEDED ON ATIVAN AND OXYCODONE  Pharmacy name entered into [facility]: [facility] [address] [place] [address] 7 AT [facility] [address]    Clinical concerns: None                  4 minutes for nursing intake (face to face time)     Zora Bentley MA            "

## 2017-11-08 NOTE — MR AVS SNAPSHOT
After Visit Summary   11/8/2017    Amira Arreola    MRN: [id number]           Patient Information     Date Of Birth          7/17/1932        Visit Information        Provider Department      11/8/2017 9:30 AM  INFUSION CHAIR 17 [facility]        Today's Diagnoses     Multiple myeloma not having achieved remission (H)    -  1    Cancer, metastatic to bone (H)        Portacath in place           Follow-ups after your visit        Your next 10 appointments already scheduled     Nov 15, 2017 11:00 AM CST   Level 2 with SH INFUSION CHAIR 16   [facility] ([facility])    [facility]  [address]  [place] [address]   [phone]            Nov 22, 2017 11:00 AM CST   Level 2 with SH INFUSION CHAIR 3   [facility] ([facility])    [facility]  [address]  [place] [address]   [phone]            Nov 29, 2017  9:30 AM CST   Level 2 with SH INFUSION CHAIR 13   [facility] ([facility])    [facility]  [address]  [place] [address]   [phone]            Dec 06, 2017  1:30 PM CST   Level 2 with SH INFUSION CHAIR 12   [facility] ([facility])    [facility]  [address]  [place] [address]   [phone]            Dec 06, 2017  2:00 PM CST   Return Visit with Shayne Roberts MD   [facility] ([facility])    [facility]  [address]  [place] [address]   [phone]              Who to contact     If you have questions or need follow up information about today's clinic visit or your schedule please contact [facility] directly at  "[phone].  Normal or non-critical lab and imaging results will be communicated to you by MyChart, letter or phone within 4 business days after the clinic has received the results. If you do not hear from us within 7 days, please contact the clinic through [employer] or phone. If you have a critical or abnormal lab result, we will notify you by phone as soon as possible.  Submit refill requests through [employer] or call your pharmacy and they will forward the refill request to us. Please allow 3 business days for your refill to be completed.          Additional Information About Your Visit        [employer] Information     [employer] lets you send messages to your doctor, view your test results, renew your prescriptions, schedule appointments and more. To sign up, go to [email or web address] . Click on \"Log in\" on the left side of the screen, which will take you to the Welcome page. Then click on \"Sign up Now\" on the right side of the page.     You will be asked to enter the access code listed below, as well as some personal information. Please follow the directions to create your username and password.     Your access code is: [email or web address]  Expires: 2017  9:24 AM     Your access code will  in 90 days. If you need help or a new code, please call your [facility] clinic or [phone].        Care EveryWhere ID     This is your Care EveryWhere ID. This could be used by other organizations to access your [facility] medical records  [id number]        Your Vitals Were     Pulse Temperature Respirations Pulse Oximetry BMI (Body Mass Index)       80 97.7  F (36.5  C) (Oral) 20 98% 21.61 kg/m2        Blood Pressure from Last 3 Encounters:   17 134/81   17 134/81   17 116/65    Weight from Last 3 Encounters:   17 60.7 kg (133 lb 12.8 oz)   17 60.7 kg (133 lb 12.8 oz)   17 61 kg (134 lb 6.4 oz)              We Performed the Following     Albumin level     Calcium     CBC with platelets " differential     Creatinine          Where to get your medicines      Some of these will need a paper prescription and others can be bought over the counter.  Ask your nurse if you have questions.     Bring a paper prescription for each of these medications     LORazepam 0.5 MG tablet    oxyCODONE IR 15 MG tablet          Primary Care Provider Office Phone # Fax #    Addy Sean Frias [phone] [address] [facility] [address]  [facility] [address]        Equal Access to Services     [facility] : Hadii aad ku hadasho Soomaali, waaxda luqadaha, qaybta kaalmada adeegyada, waxay idiin hayaan adeeg kharash la'hildan . So [facility] [phone].    ATENCIÓN: Si habla español, tiene a barlow disposición servicios gratuitos de asistencia lingüística. [facility] [phone].    We comply with applicable federal civil rights laws and [place] laws. We do not discriminate on the basis of race, color, national origin, age, disability, sex, sexual orientation, or gender identity.            Thank you!     Thank you for choosing [facility] AND [facility]  for your care. Our goal is always to provide you with excellent care. Hearing back from our patients is one way we can continue to improve our services. Please take a few minutes to complete the written survey that you may receive in the mail after your visit with us. Thank you!             Your Updated Medication List - Protect others around you: Learn how to safely use, store and throw away your medicines at www.disposemymeds.org.          This list is accurate as of: 11/8/17 11:11 AM.  Always use your most recent med list.                   Brand Name Dispense Instructions for use Diagnosis    acyclovir 400 MG tablet    ZOVIRAX    60 tablet    Take 1 tablet (400 mg) by mouth 2 times daily Viral Prophylaxis.    Multiple myeloma not having achieved remission (H)       CALCIUM CITRATE + PO      Take 2,000 mg by mouth daily 2 tabs         carboxymethylcellulose 0.5 % Soln ophthalmic solution    REFRESH PLUS     1 drop 4 times daily        CLARINEX PO      Take by mouth daily Taking claritin        COMPAZINE PO      Take 10 mg by mouth daily as needed        cycloSPORINE 0.05 % ophthalmic emulsion    RESTASIS     Place 1 drop into both eyes every 12 hours        DAILY MULTIVITAMIN PO      Take 1 tablet by mouth daily.    Routine general medical examination at a health care facility       * dexamethasone 4 MG tablet    DECADRON    28 tablet    Take 20mg (5 tablets) PO every week on the morning of velcade injection. Then take 1 tablet (4mg) by mouth for two days after darzalex.    Multiple myeloma not having achieved remission (H)       * dexamethasone 4 MG tablet    DECADRON    28 tablet    Take 20mg (5 tablets) by mouth every week on the morning of velcade injection.    Multiple myeloma not having achieved remission (H)       * dexamethasone 4 MG tablet    DECADRON    28 tablet    Take 20mg (5 tablets) by mouth every week on the morning of velcade injection.    Multiple myeloma not having achieved remission (H)       erythromycin ophthalmic ointment    ROMYCIN     Place 1 Application into both eyes At Bedtime        GENTLE STOOL SOFTENER PO      Take 100 mg by mouth daily        lidocaine-prilocaine cream    EMLA    30 g    Apply topically as needed for moderate pain Apply dollop size amount to port site 30-60 min prior to accessing    Multiple myeloma not having achieved remission (H)       LORazepam 0.5 MG tablet    ATIVAN    30 tablet    Take 1 tablet (0.5 mg) by mouth every 8 hours as needed for anxiety    Multiple myeloma not having achieved remission (H)       metoprolol 50 MG 24 hr tablet    TOPROL XL    270 tablet    Take 2 tablets (100mg) in the morning and 1 tablet (50mg) in the evening by mouth daily    Paroxysmal atrial fibrillation (H)       oxyCODONE IR 15 MG tablet    ROXICODONE    60 tablet    Take 1 tablet (15 mg) by mouth every 8  hours as needed for pain maximum 4 tablet(s) per day    Multiple myeloma not having achieved remission (H)       polyethylene glycol powder    MIRALAX/GLYCOLAX     Take 1 capful by mouth daily as needed    Bilateral leg edema       timolol 0.25 % ophthalmic solution    TIMOPTIC     Place 1 drop into the right eye 2 times daily        TYLENOL PO      Take 500 mg by mouth every 6 hours as needed for mild pain or fever        UNABLE TO FIND      MEDICATION NAME: Fresh Coat eye drops        VITAMIN D3 PO      Take 1,000 Units by mouth daily        warfarin 4 MG tablet    COUMADIN    110 tablet    TAKE ONE AND ONE-HALF TABLETS BY MOUTH ON MONDAY, WEDNESDAY, AND FRIDAY AND ONE TABLET THE OTHER DAYS OF THE WEEK    Paroxysmal atrial fibrillation (H), Long-term (current) use of anticoagulants       ZOMETA IV      Inject into the vein every 30 days Every 3 month dosing        * Notice:  This list has 3 medication(s) that are the same as other medications prescribed for you. Read the directions carefully, and ask your doctor or other care provider to review them with you.

## 2017-11-08 NOTE — MR AVS SNAPSHOT
After Visit Summary   11/8/2017    Amira Arreola    MRN: [id number]           Patient Information     Date Of Birth          7/17/1932        Visit Information        Provider Department      11/8/2017 10:00 AM Shayne Roberts MD [facility]        Today's Diagnoses     Multiple myeloma not having achieved remission (H)    -  1    Cancer, metastatic to bone (H)          Care Instructions    Continue chemotherapy.  Follow up in 1 month.          Follow-ups after your visit        Your next 10 appointments already scheduled     Nov 15, 2017 11:00 AM CST   Level 2 with SH INFUSION CHAIR 16   [facility] ([facility])    [facility]  [address]  [place] [address]   [phone]            Nov 22, 2017 11:00 AM CST   Level 2 with SH INFUSION CHAIR 3   [facility] ([facility])    [facility]  [address]  [place] [address]   [phone]            Nov 29, 2017  9:30 AM CST   Level 2 with SH INFUSION CHAIR 13   [facility] ([facility])    [facility]  [address]  [place] [address]   [phone]            Dec 06, 2017  1:30 PM CST   Level 2 with SH INFUSION CHAIR 12   [facility] ([facility])    [facility]  [address]  [place] [address]   [phone]            Dec 06, 2017  2:00 PM CST   Return Visit with Shayne Roberts MD   [facility] ([facility])    [facility]  [address]  [place] [address]   [phone]              Who to contact     If you have questions or need follow up information about today's clinic visit or your schedule please contact [facility] directly at  "[phone].  Normal or non-critical lab and imaging results will be communicated to you by MyChart, letter or phone within 4 business days after the clinic has received the results. If you do not hear from us within 7 days, please contact the clinic through [employer] or phone. If you have a critical or abnormal lab result, we will notify you by phone as soon as possible.  Submit refill requests through [employer] or call your pharmacy and they will forward the refill request to us. Please allow 3 business days for your refill to be completed.          Additional Information About Your Visit        [employer] Information     [employer] lets you send messages to your doctor, view your test results, renew your prescriptions, schedule appointments and more. To sign up, go to [email or web address] . Click on \"Log in\" on the left side of the screen, which will take you to the Welcome page. Then click on \"Sign up Now\" on the right side of the page.     You will be asked to enter the access code listed below, as well as some personal information. Please follow the directions to create your username and password.     Your access code is: [email or web address]  Expires: 2017  9:24 AM     Your access code will  in 90 days. If you need help or a new code, please call your [place] clinic or [phone].        Care EveryWhere ID     This is your Care EveryWhere ID. This could be used by other organizations to access your [place] medical records  [id number]        Your Vitals Were     Pulse Temperature Respirations Pulse Oximetry BMI (Body Mass Index)       80 97.7  F (36.5  C) (Oral) 20 98% 21.61 kg/m2        Blood Pressure from Last 3 Encounters:   17 134/81   17 134/81   17 116/65    Weight from Last 3 Encounters:   17 60.7 kg (133 lb 12.8 oz)   17 60.7 kg (133 lb 12.8 oz)   17 61 kg (134 lb 6.4 oz)              Today, you had the following     No orders found for display         Where to " get your medicines      Some of these will need a paper prescription and others can be bought over the counter.  Ask your nurse if you have questions.     Bring a paper prescription for each of these medications     LORazepam 0.5 MG tablet    oxyCODONE IR 15 MG tablet          Primary Care Provider Office Phone # Fax #    Addy Sean Frias [phone] [address] [facility] [address]  [facility] [address]        Equal Access to Services     [facility] : Hadii aad ku hadasho Soomaali, waaxda luqadaha, qaybta kaalmada adeegyada, waxay idiin hayaan adeeg khedgardoporfirio laguera . So [facility] [phone].    ATENCIÓN: Si feli park, tiene a barlow disposición servicios gratuitos de asistencia lingüística. [facility] [phone].    We comply with applicable federal civil rights laws and [place] laws. We do not discriminate on the basis of race, color, national origin, age, disability, sex, sexual orientation, or gender identity.            Thank you!     Thank you for choosing [facility]  for your care. Our goal is always to provide you with excellent care. Hearing back from our patients is one way we can continue to improve our services. Please take a few minutes to complete the written survey that you may receive in the mail after your visit with us. Thank you!             Your Updated Medication List - Protect others around you: Learn how to safely use, store and throw away your medicines at www.disposemymeds.org.          This list is accurate as of: 11/8/17 10:21 AM.  Always use your most recent med list.                   Brand Name Dispense Instructions for use Diagnosis    acyclovir 400 MG tablet    ZOVIRAX    60 tablet    Take 1 tablet (400 mg) by mouth 2 times daily Viral Prophylaxis.    Multiple myeloma not having achieved remission (H)       CALCIUM CITRATE + PO      Take 2,000 mg by mouth daily 2 tabs        carboxymethylcellulose 0.5 % Soln ophthalmic solution    REFRESH PLUS     1 drop 4  times daily        CLARINEX PO      Take by mouth daily Taking claritin        COMPAZINE PO      Take 10 mg by mouth daily as needed        cycloSPORINE 0.05 % ophthalmic emulsion    RESTASIS     Place 1 drop into both eyes every 12 hours        DAILY MULTIVITAMIN PO      Take 1 tablet by mouth daily.    Routine general medical examination at a health care facility       * dexamethasone 4 MG tablet    DECADRON    28 tablet    Take 20mg (5 tablets) PO every week on the morning of velcade injection. Then take 1 tablet (4mg) by mouth for two days after darzalex.    Multiple myeloma not having achieved remission (H)       * dexamethasone 4 MG tablet    DECADRON    28 tablet    Take 20mg (5 tablets) by mouth every week on the morning of velcade injection.    Multiple myeloma not having achieved remission (H)       * dexamethasone 4 MG tablet    DECADRON    28 tablet    Take 20mg (5 tablets) by mouth every week on the morning of velcade injection.    Multiple myeloma not having achieved remission (H)       erythromycin ophthalmic ointment    ROMYCIN     Place 1 Application into both eyes At Bedtime        GENTLE STOOL SOFTENER PO      Take 100 mg by mouth daily        lidocaine-prilocaine cream    EMLA    30 g    Apply topically as needed for moderate pain Apply dollop size amount to port site 30-60 min prior to accessing    Multiple myeloma not having achieved remission (H)       LORazepam 0.5 MG tablet    ATIVAN    30 tablet    Take 1 tablet (0.5 mg) by mouth every 8 hours as needed for anxiety    Multiple myeloma not having achieved remission (H)       metoprolol 50 MG 24 hr tablet    TOPROL XL    270 tablet    Take 2 tablets (100mg) in the morning and 1 tablet (50mg) in the evening by mouth daily    Paroxysmal atrial fibrillation (H)       oxyCODONE IR 15 MG tablet    ROXICODONE    60 tablet    Take 1 tablet (15 mg) by mouth every 8 hours as needed for pain maximum 4 tablet(s) per day    Multiple myeloma not having  achieved remission (H)       polyethylene glycol powder    MIRALAX/GLYCOLAX     Take 1 capful by mouth daily as needed    Bilateral leg edema       timolol 0.25 % ophthalmic solution    TIMOPTIC     Place 1 drop into the right eye 2 times daily        TYLENOL PO      Take 500 mg by mouth every 6 hours as needed for mild pain or fever        UNABLE TO FIND      MEDICATION NAME: Fresh Coat eye drops        VITAMIN D3 PO      Take 1,000 Units by mouth daily        warfarin 4 MG tablet    COUMADIN    110 tablet    TAKE ONE AND ONE-HALF TABLETS BY MOUTH ON MONDAY, WEDNESDAY, AND FRIDAY AND ONE TABLET THE OTHER DAYS OF THE WEEK    Paroxysmal atrial fibrillation (H), Long-term (current) use of anticoagulants       ZOMETA IV      Inject into the vein every 30 days Every 3 month dosing        * Notice:  This list has 3 medication(s) that are the same as other medications prescribed for you. Read the directions carefully, and ask your doctor or other care provider to review them with you.

## 2017-11-08 NOTE — PROGRESS NOTES
Infusion Nursing Note:  Amira Arreola presents today for Cycle 6 Day 22 Velcade, Zometa.    Patient seen by provider today: Yes: Dr. Roberts   present during visit today: Not Applicable.    Note: N/A.    Intravenous Access:  Implanted Port.    Treatment Conditions:  Lab Results   Component Value Date    HGB 13.2 11/08/2017     Lab Results   Component Value Date    WBC 7.6 11/08/2017      Lab Results   Component Value Date    ANEU 7.0 11/08/2017     Lab Results   Component Value Date     11/08/2017      Lab Results   Component Value Date     06/13/2017                   Lab Results   Component Value Date    POTASSIUM 4.0 06/13/2017           No results found for: MAG         Lab Results   Component Value Date    CR 0.59 11/08/2017                   Lab Results   Component Value Date    BRADLEY 9.4 11/08/2017                Lab Results   Component Value Date    BILITOTAL 0.5 10/18/2017           Lab Results   Component Value Date    ALBUMIN 3.7 11/08/2017                    Lab Results   Component Value Date    ALT 27 10/18/2017           Lab Results   Component Value Date    AST 22 10/18/2017     Results reviewed, labs MET treatment parameters, ok to proceed with treatment.      Post Infusion Assessment:  Patient tolerated infusion without incident.  Patient tolerated injection without incident.  Blood return noted pre and post infusion.  Site patent and intact, free from redness, edema or discomfort.  No evidence of extravasations.  Access discontinued per protocol.    Discharge Plan:   Prescription refills given for Oxycodone and Ativan at outside pharmacy.  Discharge instructions reviewed with: Patient and Family.  Patient and family verbalized understanding of discharge instructions and all questions answered.  Copy of AVS reviewed with patient and family.  Patient will return 11/15/17 for next appointment.  Patient discharged in stable condition accompanied by: self and daughter.  Departure  Mode: Ambulatory.    Senia Juan RN
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
## 2017-11-08 NOTE — PROGRESS NOTES
ANTICOAGULATION FOLLOW-UP CLINIC VISIT    Patient Name:  Amira Arreola  Date:  11/8/2017  Contact Type:  Telephone    SUBJECTIVE:     Patient Findings     Positives No Problem Findings           OBJECTIVE    INR   Date Value Ref Range Status   11/08/2017 2.51 (H) 0.86 - 1.14 Final       ASSESSMENT / PLAN  INR assessment THER    Recheck INR In: 1 WEEK    INR Location Clinic      Anticoagulation Summary as of 11/8/2017     INR goal 2.0-3.0   Today's INR 2.51   Maintenance plan 6 mg (4 mg x 1.5) on Mon, Fri; 4 mg (4 mg x 1) all other days   Full instructions 6 mg on Mon, Fri; 4 mg all other days   Weekly total 32 mg   No change documented Dayana Barrera RN   Plan last modified Dayana Barrera RN (9/14/2017)   Next INR check 11/15/2017   Target end date Indefinite    Indications   Long-term (current) use of anticoagulants [Z79.01] [Z79.01]  Atrial fibrillation (H) [I48.91] (Resolved) [I48.91]         Anticoagulation Episode Summary     INR check location     Preferred lab     Send INR reminders to [facility]    Comments patient have standing INR order to be draw at infusion visit.  advise to call [facility] when have INR draw for dosing instruction.  patient can be reach at home phone [phone]      Anticoagulation Care Providers     Provider Role Specialty Phone number    Addy Frias MD [facility] Internal Medicine [phone]            See the Encounter Report to view Anticoagulation Flowsheet and Dosing Calendar (Go to Encounters tab in chart review, and find the Anticoagulation Therapy Visit)    Dosage adjustment made based on physician directed care plan.    Patient call report INR done at infusion center today - therapeutic at 2.5. Advised to continue with 6 mg on Mon, Fri;  4 mg all other days = 32 mg weekly.  Recheck in 1 week at infusion center and will call for dosing instruction.      Dayana Barrera RN

## 2017-11-10 ENCOUNTER — TELEPHONE (OUTPATIENT)
Dept: FAMILY MEDICINE | Facility: CLINIC | Age: 82
End: 2017-11-10

## 2017-11-10 ENCOUNTER — TRANSFERRED RECORDS (OUTPATIENT)
Dept: HEALTH INFORMATION MANAGEMENT | Facility: CLINIC | Age: 82
End: 2017-11-10

## 2017-11-10 NOTE — TELEPHONE ENCOUNTER
Reason for Call:  Catheter     Detailed comments: patient was just in [facility] in [place]  And needs to have a Hospital Follow up she wants to come in on 11/15 cause she has an   Appt.with her Cancer center this day and wants her Hospital f/u same day,  is out  That day and she will see anyone who has Catheter knowledge  Please call her with who can see her on 11/15  Thank you    Phone Number Patient can be reached at: Home number on file [phone] (home)    Best Time: anytime    Can we leave a detailed message on this number? YES    Call taken on 11/10/2017 at 10:39 AM by Pacheco Villagran

## 2017-11-13 ENCOUNTER — TELEPHONE (OUTPATIENT)
Dept: FAMILY MEDICINE | Facility: CLINIC | Age: 82
End: 2017-11-13

## 2017-11-13 ENCOUNTER — OFFICE VISIT (OUTPATIENT)
Dept: FAMILY MEDICINE | Facility: CLINIC | Age: 82
End: 2017-11-13
Payer: COMMERCIAL

## 2017-11-13 VITALS
OXYGEN SATURATION: 99 % | SYSTOLIC BLOOD PRESSURE: 132 MMHG | BODY MASS INDEX: 22.02 KG/M2 | HEART RATE: 79 BPM | DIASTOLIC BLOOD PRESSURE: 80 MMHG | WEIGHT: 137 LBS | TEMPERATURE: 97.2 F | HEIGHT: 66 IN

## 2017-11-13 DIAGNOSIS — Z96.0 URINARY CATHETER IN PLACE: ICD-10-CM

## 2017-11-13 DIAGNOSIS — R30.0 DYSURIA: Primary | ICD-10-CM

## 2017-11-13 LAB
ALBUMIN UR-MCNC: 30 MG/DL
APPEARANCE UR: CLEAR
BACTERIA #/AREA URNS HPF: ABNORMAL /HPF
BILIRUB UR QL STRIP: NEGATIVE
COLOR UR AUTO: YELLOW
GLUCOSE UR STRIP-MCNC: NEGATIVE MG/DL
HGB UR QL STRIP: ABNORMAL
KETONES UR STRIP-MCNC: NEGATIVE MG/DL
LEUKOCYTE ESTERASE UR QL STRIP: NEGATIVE
NITRATE UR QL: NEGATIVE
NON-SQ EPI CELLS #/AREA URNS LPF: ABNORMAL /LPF
PH UR STRIP: 7 PH (ref 5–7)
RBC #/AREA URNS AUTO: ABNORMAL /HPF
SOURCE: ABNORMAL
SP GR UR STRIP: 1.02 (ref 1–1.03)
UROBILINOGEN UR STRIP-ACNC: 0.2 EU/DL (ref 0.2–1)
WBC #/AREA URNS AUTO: ABNORMAL /HPF

## 2017-11-13 PROCEDURE — 81001 URINALYSIS AUTO W/SCOPE: CPT | Performed by: NURSE PRACTITIONER

## 2017-11-13 PROCEDURE — 99213 OFFICE O/P EST LOW 20 MIN: CPT | Performed by: NURSE PRACTITIONER

## 2017-11-13 PROCEDURE — 87086 URINE CULTURE/COLONY COUNT: CPT | Performed by: NURSE PRACTITIONER

## 2017-11-13 NOTE — TELEPHONE ENCOUNTER
Reason for Call:  Other     Detailed comments: The patient said to let GEETHA gonsalez know that after the catherter was removed today   she did void about 3 times now   She said this is good news     Phone Number Patient can be reached at: Home number on file [phone] (home)    Best Time: anytime    Can we leave a detailed message on this number? YES    Call taken on 11/13/2017 at 3:23 PM by Vera Fajardo

## 2017-11-13 NOTE — MR AVS SNAPSHOT
After Visit Summary   11/13/2017    Amira Arreola    MRN: [id number]           Patient Information     Date Of Birth          7/17/1932        Visit Information        Provider Department      11/13/2017 10:30 AM Nancy Garibay APRN [facility]        Today's Diagnoses     Dysuria    -  1    Urinary catheter in place           Follow-ups after your visit        Additional Services     UROLOGY ADULT REFERRAL       Your provider has referred you to: N: [facility] [place] [phone]   [email or web address]    Please be aware that coverage of these services is subject to the terms and limitations of your health insurance plan.  Call member services at your health plan with any benefit or coverage questions.      Please bring the following with you to your appointment:    (1) Any X-Rays, CTs or MRIs which have been performed.  Contact the facility where they were done to arrange for  prior to your scheduled appointment.    (2) List of current medications  (3) This referral request   (4) Any documents/labs given to you for this referral                  Your next 10 appointments already scheduled     Nov 15, 2017 11:00 AM CST   Level 2 with  INFUSION CHAIR 16   [facility] ([facility])    [facility]  [address]  [place] [address]   [phone]            Nov 22, 2017 11:00 AM CST   Level 2 with SH INFUSION CHAIR 3   [facility] ([facility])    [facility]  [address]  [place] [address]   [phone]            Nov 29, 2017  9:30 AM CST   Level 2 with SH INFUSION CHAIR 13   [facility] ([facility])    [facility] [place]  [address]  [place] [address]   [phone]            Dec 06, 2017  1:30 PM CST   Level 2  "with  INFUSION CHAIR 12   [facility] ([facility])    [facility]  [address]  [place] [id number] [phone]            Dec 06, 2017  2:00 PM CST   Return Visit with Shayne Roberts MD   [facility] ([facility])    [facility]  [address]  [place] [address] [phone]              Who to contact     If you have questions or need follow up information about today's clinic visit or your schedule please contact [facility] directly at [phone].  Normal or non-critical lab and imaging results will be communicated to you by K[employer], letter or phone within 4 business days after the clinic has received the results. If you do not hear from us within 7 days, please contact the clinic through Kareohart or phone. If you have a critical or abnormal lab result, we will notify you by phone as soon as possible.  Submit refill requests through [employer] or call your pharmacy and they will forward the refill request to us. Please allow 3 business days for your refill to be completed.          Additional Information About Your Visit        [employer] Information     [employer]Point lets you send messages to your doctor, view your test results, renew your prescriptions, schedule appointments and more. To sign up, go to [email or web address] . Click on \"Log in\" on the left side of the screen, which will take you to the Welcome page. Then click on \"Sign up Now\" on the right side of the page.     You will be asked to enter the access code listed below, as well as some personal information. Please follow the directions to create your username and password.     Your access code is: [email or web address]  Expires: 2017  9:24 AM     Your access code will  in 90 days. If you need help or a new code, please call your [place] clinic or [phone].        Care EveryWhere ID     " "This is your Care EveryWhere ID. This could be used by other organizations to access your [facility] medical records  [id number]        Your Vitals Were     Pulse Temperature Height Pulse Oximetry BMI (Body Mass Index)       79 97.2  F (36.2  C) (Oral) 5' 5.9\" (1.674 m) 99% 22.18 kg/m2        Blood Pressure from Last 3 Encounters:   11/13/17 132/80   11/08/17 134/81   11/08/17 134/81    Weight from Last 3 Encounters:   11/13/17 137 lb (62.1 kg)   11/08/17 133 lb 12.8 oz (60.7 kg)   11/08/17 133 lb 12.8 oz (60.7 kg)              We Performed the Following     *UA reflex to Microscopic and Culture ([facility] (except [place] and [place])     UROLOGY ADULT REFERRAL          Today's Medication Changes          These changes are accurate as of: 11/13/17 11:00 AM.  If you have any questions, ask your nurse or doctor.               These medicines have changed or have updated prescriptions.        Dose/Directions    * dexamethasone 4 MG tablet   Commonly known as:  DECADRON   This may have changed:  Another medication with the same name was removed. Continue taking this medication, and follow the directions you see here.   Used for:  Multiple myeloma not having achieved remission (H)        Take 20mg (5 tablets) PO every week on the morning of velcade injection. Then take 1 tablet (4mg) by mouth for two days after darzalex.   Quantity:  28 tablet   Refills:  0       * dexamethasone 4 MG tablet   Commonly known as:  DECADRON   This may have changed:  Another medication with the same name was removed. Continue taking this medication, and follow the directions you see here.   Used for:  Multiple myeloma not having achieved remission (H)        Take 20mg (5 tablets) by mouth every week on the morning of velcade injection.   Quantity:  28 tablet   Refills:  0       * Notice:  This list has 2 medication(s) that are the same as other medications prescribed for you. Read the directions carefully, and ask your " doctor or other care provider to review them with you.             Primary Care Provider Office Phone # Fax #    Addy Frias [phone] [address] [address]        Equal Access to Services     SARA ZELAYA : Hadii aad ku haddoriso Soomaali, waaxda luqadaha, qaybta kaalmada adeegyada, waxsanford idiin hayhildan adesergio diego trip sadler. So [facility] [phone].    ATENCIÓN: Si habla español, tiene a barlow disposición servicios gratuitos de asistencia lingüística. Llame al [phone].    We comply with applicable federal civil rights laws and [place] laws. We do not discriminate on the basis of race, color, national origin, age, disability, sex, sexual orientation, or gender identity.            Thank you!     Thank you for choosing [facility]  for your care. Our goal is always to provide you with excellent care. Hearing back from our patients is one way we can continue to improve our services. Please take a few minutes to complete the written survey that you may receive in the mail after your visit with us. Thank you!             Your Updated Medication List - Protect others around you: Learn how to safely use, store and throw away your medicines at www.disposemymeds.org.          This list is accurate as of: 11/13/17 11:00 AM.  Always use your most recent med list.                   Brand Name Dispense Instructions for use Diagnosis    acyclovir 400 MG tablet    ZOVIRAX    60 tablet    Take 1 tablet (400 mg) by mouth 2 times daily Viral Prophylaxis.    Multiple myeloma not having achieved remission (H)       CALCIUM CITRATE + PO      Take 2,000 mg by mouth daily 2 tabs        carboxymethylcellulose 0.5 % Soln ophthalmic solution    REFRESH PLUS     1 drop 4 times daily        CLARINEX PO      Take by mouth daily Taking claritin        COMPAZINE PO      Take 10 mg by mouth daily as needed        cycloSPORINE 0.05 % ophthalmic emulsion    RESTASIS     Place 1 drop  into both eyes every 12 hours        DAILY MULTIVITAMIN PO      Take 1 tablet by mouth daily.    Routine general medical examination at a health care facility       * dexamethasone 4 MG tablet    DECADRON    28 tablet    Take 20mg (5 tablets) PO every week on the morning of velcade injection. Then take 1 tablet (4mg) by mouth for two days after darzalex.    Multiple myeloma not having achieved remission (H)       * dexamethasone 4 MG tablet    DECADRON    28 tablet    Take 20mg (5 tablets) by mouth every week on the morning of velcade injection.    Multiple myeloma not having achieved remission (H)       erythromycin ophthalmic ointment    ROMYCIN     Place 1 Application into both eyes At Bedtime        GENTLE STOOL SOFTENER PO      Take 100 mg by mouth daily        lidocaine-prilocaine cream    EMLA    30 g    Apply topically as needed for moderate pain Apply dollop size amount to port site 30-60 min prior to accessing    Multiple myeloma not having achieved remission (H)       LORazepam 0.5 MG tablet    ATIVAN    30 tablet    Take 1 tablet (0.5 mg) by mouth every 8 hours as needed for anxiety    Multiple myeloma not having achieved remission (H)       metoprolol 50 MG 24 hr tablet    TOPROL XL    270 tablet    Take 2 tablets (100mg) in the morning and 1 tablet (50mg) in the evening by mouth daily    Paroxysmal atrial fibrillation (H)       oxyCODONE IR 15 MG tablet    ROXICODONE    60 tablet    Take 1 tablet (15 mg) by mouth every 8 hours as needed for pain maximum 4 tablet(s) per day    Multiple myeloma not having achieved remission (H)       polyethylene glycol powder    MIRALAX/GLYCOLAX     Take 1 capful by mouth daily as needed    Bilateral leg edema       timolol 0.25 % ophthalmic solution    TIMOPTIC     Place 1 drop into the right eye 2 times daily        TYLENOL PO      Take 500 mg by mouth every 6 hours as needed for mild pain or fever        UNABLE TO FIND      MEDICATION NAME: Fresh Coat eye drops         VITAMIN D3 PO      Take 1,000 Units by mouth daily        warfarin 4 MG tablet    COUMADIN    110 tablet    TAKE ONE AND ONE-HALF TABLETS BY MOUTH ON MONDAY, WEDNESDAY, AND FRIDAY AND ONE TABLET THE OTHER DAYS OF THE WEEK    Paroxysmal atrial fibrillation (H), Long-term (current) use of anticoagulants       ZOMETA IV      Inject into the vein every 30 days Every 3 month dosing        * Notice:  This list has 2 medication(s) that are the same as other medications prescribed for you. Read the directions carefully, and ask your doctor or other care provider to review them with you.

## 2017-11-13 NOTE — PROGRESS NOTES
SUBJECTIVE:   Amira Arreola is a 85 year old female who presents to clinic today for the following health issues:      ED/UC Followup:    Facility:  [facility] in [place]  Date of visit: 11/10/17  Reason for visit: distended bladder  Current Status: has catheter in but it feels as though she needs to void  States that Friday night fell on her sacrum but did not sustain much injury.  No pain at all right now, denies saddle paresthesia.  Able to move her bowels without problem twice in past 2 days  Has MM.  MRI of lumbar spine 6/2016IMPRESSION:    1. Bilateral right greater than left sacral insufficiency fractures  and probable bilateral iliac insufficiency fractures paralleling the  SI joint. There may be myelomatous deposits in the posterior iliac  bones as well.  2. There is the appearance of a Schmorl's node on the inferior  endplate of L1 to the right of midline. Most prominent central  stenosis is moderate to severe at L4-L5 and moderate at L3-L4.  3. Most prominent foraminal stenosis is moderate bilaterally at L5-S1,  moderate on the right at L4-L5. See above for details at each level.          Problem list and histories reviewed & adjusted, as indicated.  Additional history: as documented    Patient Active Problem List   Diagnosis     Advanced directives, counseling/discussion     Benign essential hypertension     Colonic polyp     CARDIOVASCULAR SCREENING; LDL GOAL LESS THAN 160     Hyperlipidemia LDL goal <160     Sciatica of left side     Osteoporosis     OAB (overactive bladder)     Elevated MCV     Thrombocytopenia (H)     MGUS (monoclonal gammopathy of unknown significance)     Ascending aorta dilatation (H)     SVT (supraventricular tachycardia) (H)     Paroxysmal atrial fibrillation (H)     Long-term (current) use of anticoagulants [Z79.01]     Cancer, metastatic to bone (H)     Multiple myeloma not having achieved remission (H)     Portacath in place     Past Surgical  History:   Procedure Laterality Date     BONE MARROW BIOPSY, BONE SPECIMEN, NEEDLE/TROCAR N/A 2016    Procedure: BIOPSY BONE MARROW;  Surgeon: Bryan Patel MD;  Location:  GI     CATARACT IOL, RT/LT        SECTION  1965, 1966     COLONOSCOPY  2013    Procedure: COLONOSCOPY;  COLONOSCOPY;  Surgeon: Steffany Rockwell MD;  Location:  GI     EXCISE EXOSTOSIS TIBIA / FIBULA  2014    Procedure: EXCISE EXOSTOSIS TIBIA / FIBULA;  Surgeon: Naila Pichardo MD;  Location:  [facility]     hysteroscopy and d and c      due to bleeding     left anle replacement       right ankle surgery         Social History   Substance Use Topics     Smoking status: Never Smoker     Smokeless tobacco: Never Used     Alcohol use No     Family History   Problem Relation Age of Onset     HEART DISEASE Father      C.A.D. Mother      CEREBROVASCULAR DISEASE Brother      Family History Negative Sister      Family History Negative Sister      Family History Negative Brother          Current Outpatient Prescriptions   Medication Sig Dispense Refill     oxyCODONE IR (ROXICODONE) 15 MG tablet Take 1 tablet (15 mg) by mouth every 8 hours as needed for pain maximum 4 tablet(s) per day 60 tablet 0     LORazepam (ATIVAN) 0.5 MG tablet Take 1 tablet (0.5 mg) by mouth every 8 hours as needed for anxiety 30 tablet 3     warfarin (COUMADIN) 4 MG tablet TAKE ONE AND ONE-HALF TABLETS BY MOUTH ON MONDAY, WEDNESDAY, AND FRIDAY AND ONE TABLET THE OTHER DAYS OF THE WEEK 110 tablet 0     dexamethasone (DECADRON) 4 MG tablet Take 20mg (5 tablets) by mouth every week on the morning of velcade injection. 28 tablet 0     metoprolol (TOPROL XL) 50 MG 24 hr tablet Take 2 tablets (100mg) in the morning and 1 tablet (50mg) in the evening by mouth daily 270 tablet 1     dexamethasone (DECADRON) 4 MG tablet Take 20mg (5 tablets) PO every week on the morning of velcade injection. Then take 1 tablet (4mg) by mouth for two  days after darzalex. 28 tablet 0     lidocaine-prilocaine (EMLA) cream Apply topically as needed for moderate pain Apply dollop size amount to port site 30-60 min prior to accessing 30 g 1     Acetaminophen (TYLENOL PO) Take 500 mg by mouth every 6 hours as needed for mild pain or fever       acyclovir (ZOVIRAX) 400 MG tablet Take 1 tablet (400 mg) by mouth 2 times daily Viral Prophylaxis. 60 tablet 11     Zoledronic Acid (ZOMETA IV) Inject into the vein every 30 days Every 3 month dosing       Desloratadine (CLARINEX PO) Take by mouth daily Taking claritin       Prochlorperazine Maleate (COMPAZINE PO) Take 10 mg by mouth daily as needed        polyethylene glycol (MIRALAX/GLYCOLAX) powder Take 1 capful by mouth daily as needed        UNABLE TO FIND MEDICATION NAME: Fresh Coat eye drops       timolol (TIMOPTIC) 0.25 % ophthalmic solution Place 1 drop into the right eye 2 times daily        carboxymethylcellulose (REFRESH PLUS) 0.5 % SOLN 1 drop 4 times daily       Cholecalciferol (VITAMIN D3 PO) Take 1,000 Units by mouth daily       Calcium Citrate-Vitamin D (CALCIUM CITRATE + PO) Take 2,000 mg by mouth daily 2 tabs       erythromycin (ROMYCIN) ophthalmic ointment Place 1 Application into both eyes At Bedtime        cycloSPORINE (RESTASIS) 0.05 % ophthalmic emulsion Place 1 drop into both eyes every 12 hours        Docusate Sodium (GENTLE STOOL SOFTENER PO) Take 100 mg by mouth daily        Multiple Vitamin (DAILY MULTIVITAMIN PO) Take 1 tablet by mouth daily.       [DISCONTINUED] dexamethasone (DECADRON) 4 MG tablet Take 20mg (5 tablets) by mouth every week on the morning of velcade injection. 28 tablet 0     Allergies   Allergen Reactions     Penicillin [Penicillins] Rash     Blotches on chest          Reviewed and updated as needed this visit by clinical staff       Reviewed and updated as needed this visit by Provider         ROS:  Constitutional, HEENT, cardiovascular, pulmonary, gi and gu systems are negative,  "except as otherwise noted.      OBJECTIVE:   /80 (BP Location: Left arm, Cuff Size: Adult Regular)  Pulse 79  Temp 97.2  F (36.2  C) (Oral)  Ht 5' 5.9\" (1.674 m)  Wt 137 lb (62.1 kg)  SpO2 99%  BMI 22.18 kg/m2  Body mass index is 22.18 kg/(m^2).  GENERAL: healthy, alert and no distress  NECK: no adenopathy, no asymmetry, masses, or scars and thyroid normal to palpation  RESP: lungs clear to auscultation - no rales, rhonchi or wheezes  CV: regular rate and rhythm, normal S1 S2, no S3 or S4, no murmur, click or rub,  peripheral edema and peripheral pulses strong  ABDOMEN: soft, nontender, no hepatosplenomegaly, no masses and bowel sounds normal  MS: no gross musculoskeletal defects noted, bilateral ankle edema unchanged  SKIN: no suspicious lesions or rashes    Diagnostic Test Results:  Results for orders placed or performed in visit on 11/13/17 (from the past 24 hour(s))   *UA reflex to Microscopic and Culture ([place] [facility] (except [place] and [place])   Result Value Ref Range    Color Urine Yellow     Appearance Urine Clear     Glucose Urine Negative NEG^Negative mg/dL    Bilirubin Urine Negative NEG^Negative    Ketones Urine Negative NEG^Negative mg/dL    Specific Gravity Urine 1.020 1.003 - 1.035    Blood Urine Large (A) NEG^Negative    pH Urine 7.0 5.0 - 7.0 pH    Protein Albumin Urine 30 (A) NEG^Negative mg/dL    Urobilinogen Urine 0.2 0.2 - 1.0 EU/dL    Nitrite Urine Negative NEG^Negative    Leukocyte Esterase Urine Negative NEG^Negative    Source Catheterized Urine    Urine Microscopic   Result Value Ref Range    WBC Urine O - 2 OTO2^O - 2 /HPF    RBC Urine 10-25 (A) OTO2^O - 2 /HPF    Squamous Epithelial /LPF Urine Few FEW^Few /LPF    Bacteria Urine Few (A) NEG^Negative /HPF       ASSESSMENT/PLAN:       ICD-10-CM    1. Dysuria R30.0 *UA reflex to Microscopic and Culture ([place] [facility] (except [place] and [place])     Urine Culture Aerobic Bacterial     Urine " Microscopic   2. Urinary catheter in place Z92.89 UROLOGY ADULT REFERRAL   we have removed the catheter  If Mrs Arreola is unable to void within 8 hours she will go to  for reinsertion of catheter  She has referral to urology   Blood in urine likely related to catheter irritation and coumadin therapy      DAYSI Estrada [facility]

## 2017-11-13 NOTE — NURSING NOTE
"Chief Complaint   Patient presents with     Hospital F/U       Initial /80 (BP Location: Left arm, Cuff Size: Adult Regular)  Pulse 79  Temp 97.2  F (36.2  C) (Oral)  Ht 5' 5.9\" (1.674 m)  Wt 137 lb (62.1 kg)  SpO2 99%  BMI 22.18 kg/m2 Estimated body mass index is 22.18 kg/(m^2) as calculated from the following:    Height as of this encounter: 5' 5.9\" (1.674 m).    Weight as of this encounter: 137 lb (62.1 kg).  Medication Reconciliation: complete     Lee Ann Adam MA    "

## 2017-11-14 LAB
BACTERIA SPEC CULT: NO GROWTH
SPECIMEN SOURCE: NORMAL

## 2017-11-14 RX ORDER — ALBUTEROL SULFATE 0.83 MG/ML
2.5 SOLUTION RESPIRATORY (INHALATION)
Status: CANCELLED | OUTPATIENT
Start: 2017-11-22

## 2017-11-14 RX ORDER — DIPHENHYDRAMINE HCL 25 MG
50 CAPSULE ORAL ONCE
Status: CANCELLED | OUTPATIENT
Start: 2017-11-15

## 2017-11-14 RX ORDER — SODIUM CHLORIDE 9 MG/ML
1000 INJECTION, SOLUTION INTRAVENOUS CONTINUOUS PRN
Status: CANCELLED
Start: 2017-11-15

## 2017-11-14 RX ORDER — DIPHENHYDRAMINE HYDROCHLORIDE 50 MG/ML
50 INJECTION INTRAMUSCULAR; INTRAVENOUS
Status: CANCELLED
Start: 2017-11-29

## 2017-11-14 RX ORDER — HEPARIN SODIUM (PORCINE) LOCK FLUSH IV SOLN 100 UNIT/ML 100 UNIT/ML
5 SOLUTION INTRAVENOUS EVERY 8 HOURS
Status: CANCELLED
Start: 2017-11-15

## 2017-11-14 RX ORDER — ALBUTEROL SULFATE 90 UG/1
1-2 AEROSOL, METERED RESPIRATORY (INHALATION)
Status: CANCELLED
Start: 2017-12-06

## 2017-11-14 RX ORDER — METHYLPREDNISOLONE SODIUM SUCCINATE 125 MG/2ML
125 INJECTION, POWDER, LYOPHILIZED, FOR SOLUTION INTRAMUSCULAR; INTRAVENOUS
Status: CANCELLED
Start: 2017-11-15

## 2017-11-14 RX ORDER — ALBUTEROL SULFATE 90 UG/1
1-2 AEROSOL, METERED RESPIRATORY (INHALATION)
Status: CANCELLED
Start: 2017-11-15

## 2017-11-14 RX ORDER — ALBUTEROL SULFATE 90 UG/1
1-2 AEROSOL, METERED RESPIRATORY (INHALATION)
Status: CANCELLED
Start: 2017-11-29

## 2017-11-14 RX ORDER — HEPARIN SODIUM (PORCINE) LOCK FLUSH IV SOLN 100 UNIT/ML 100 UNIT/ML
5 SOLUTION INTRAVENOUS EVERY 8 HOURS
Status: CANCELLED
Start: 2017-12-06

## 2017-11-14 RX ORDER — ALBUTEROL SULFATE 0.83 MG/ML
2.5 SOLUTION RESPIRATORY (INHALATION)
Status: CANCELLED | OUTPATIENT
Start: 2017-11-15

## 2017-11-14 RX ORDER — MEPERIDINE HYDROCHLORIDE 50 MG/ML
25 INJECTION INTRAMUSCULAR; INTRAVENOUS; SUBCUTANEOUS EVERY 30 MIN PRN
Status: CANCELLED | OUTPATIENT
Start: 2017-11-22

## 2017-11-14 RX ORDER — ACETAMINOPHEN 325 MG/1
650 TABLET ORAL ONCE
Status: CANCELLED | OUTPATIENT
Start: 2017-11-15

## 2017-11-14 RX ORDER — LORAZEPAM 2 MG/ML
0.5 INJECTION INTRAMUSCULAR EVERY 4 HOURS PRN
Status: CANCELLED
Start: 2017-11-22

## 2017-11-14 RX ORDER — LORAZEPAM 2 MG/ML
0.5 INJECTION INTRAMUSCULAR EVERY 4 HOURS PRN
Status: CANCELLED
Start: 2017-12-06

## 2017-11-14 RX ORDER — EPINEPHRINE 1 MG/ML
0.3 INJECTION, SOLUTION INTRAMUSCULAR; SUBCUTANEOUS EVERY 5 MIN PRN
Status: CANCELLED | OUTPATIENT
Start: 2017-11-15

## 2017-11-14 RX ORDER — METHYLPREDNISOLONE SODIUM SUCCINATE 125 MG/2ML
125 INJECTION, POWDER, LYOPHILIZED, FOR SOLUTION INTRAMUSCULAR; INTRAVENOUS
Status: CANCELLED
Start: 2017-11-22

## 2017-11-14 RX ORDER — HEPARIN SODIUM (PORCINE) LOCK FLUSH IV SOLN 100 UNIT/ML 100 UNIT/ML
5 SOLUTION INTRAVENOUS EVERY 8 HOURS
Status: CANCELLED
Start: 2017-11-22

## 2017-11-14 RX ORDER — DIPHENHYDRAMINE HYDROCHLORIDE 50 MG/ML
50 INJECTION INTRAMUSCULAR; INTRAVENOUS
Status: CANCELLED
Start: 2017-11-22

## 2017-11-14 RX ORDER — ALBUTEROL SULFATE 0.83 MG/ML
2.5 SOLUTION RESPIRATORY (INHALATION)
Status: CANCELLED | OUTPATIENT
Start: 2017-12-06

## 2017-11-14 RX ORDER — DIPHENHYDRAMINE HYDROCHLORIDE 50 MG/ML
50 INJECTION INTRAMUSCULAR; INTRAVENOUS
Status: CANCELLED
Start: 2017-11-15

## 2017-11-14 RX ORDER — SODIUM CHLORIDE 9 MG/ML
1000 INJECTION, SOLUTION INTRAVENOUS CONTINUOUS PRN
Status: CANCELLED
Start: 2017-12-06

## 2017-11-14 RX ORDER — MEPERIDINE HYDROCHLORIDE 50 MG/ML
25 INJECTION INTRAMUSCULAR; INTRAVENOUS; SUBCUTANEOUS EVERY 30 MIN PRN
Status: CANCELLED | OUTPATIENT
Start: 2017-11-15

## 2017-11-14 RX ORDER — EPINEPHRINE 1 MG/ML
0.3 INJECTION, SOLUTION INTRAMUSCULAR; SUBCUTANEOUS EVERY 5 MIN PRN
Status: CANCELLED | OUTPATIENT
Start: 2017-11-22

## 2017-11-14 RX ORDER — SODIUM CHLORIDE 9 MG/ML
1000 INJECTION, SOLUTION INTRAVENOUS CONTINUOUS PRN
Status: CANCELLED
Start: 2017-11-22

## 2017-11-14 RX ORDER — ALBUTEROL SULFATE 90 UG/1
1-2 AEROSOL, METERED RESPIRATORY (INHALATION)
Status: CANCELLED
Start: 2017-11-22

## 2017-11-14 RX ORDER — MEPERIDINE HYDROCHLORIDE 50 MG/ML
25 INJECTION INTRAMUSCULAR; INTRAVENOUS; SUBCUTANEOUS EVERY 30 MIN PRN
Status: CANCELLED | OUTPATIENT
Start: 2017-11-29

## 2017-11-14 RX ORDER — METHYLPREDNISOLONE SODIUM SUCCINATE 125 MG/2ML
125 INJECTION, POWDER, LYOPHILIZED, FOR SOLUTION INTRAMUSCULAR; INTRAVENOUS
Status: CANCELLED
Start: 2017-12-06

## 2017-11-14 RX ORDER — SODIUM CHLORIDE 9 MG/ML
1000 INJECTION, SOLUTION INTRAVENOUS CONTINUOUS PRN
Status: CANCELLED
Start: 2017-11-29

## 2017-11-14 RX ORDER — EPINEPHRINE 0.3 MG/.3ML
0.3 INJECTION SUBCUTANEOUS EVERY 5 MIN PRN
Status: CANCELLED | OUTPATIENT
Start: 2017-11-22

## 2017-11-14 RX ORDER — METHYLPREDNISOLONE SODIUM SUCCINATE 125 MG/2ML
125 INJECTION, POWDER, LYOPHILIZED, FOR SOLUTION INTRAMUSCULAR; INTRAVENOUS
Status: CANCELLED
Start: 2017-11-29

## 2017-11-14 RX ORDER — ALBUTEROL SULFATE 0.83 MG/ML
2.5 SOLUTION RESPIRATORY (INHALATION)
Status: CANCELLED | OUTPATIENT
Start: 2017-11-29

## 2017-11-14 RX ORDER — LORAZEPAM 2 MG/ML
0.5 INJECTION INTRAMUSCULAR EVERY 4 HOURS PRN
Status: CANCELLED
Start: 2017-11-29

## 2017-11-14 RX ORDER — HEPARIN SODIUM (PORCINE) LOCK FLUSH IV SOLN 100 UNIT/ML 100 UNIT/ML
5 SOLUTION INTRAVENOUS EVERY 8 HOURS
Status: CANCELLED
Start: 2017-11-29

## 2017-11-14 RX ORDER — MEPERIDINE HYDROCHLORIDE 50 MG/ML
25 INJECTION INTRAMUSCULAR; INTRAVENOUS; SUBCUTANEOUS EVERY 30 MIN PRN
Status: CANCELLED | OUTPATIENT
Start: 2017-12-06

## 2017-11-14 RX ORDER — EPINEPHRINE 0.3 MG/.3ML
0.3 INJECTION SUBCUTANEOUS EVERY 5 MIN PRN
Status: CANCELLED | OUTPATIENT
Start: 2017-12-06

## 2017-11-14 RX ORDER — EPINEPHRINE 1 MG/ML
0.3 INJECTION, SOLUTION INTRAMUSCULAR; SUBCUTANEOUS EVERY 5 MIN PRN
Status: CANCELLED | OUTPATIENT
Start: 2017-11-29

## 2017-11-14 RX ORDER — EPINEPHRINE 1 MG/ML
0.3 INJECTION, SOLUTION INTRAMUSCULAR; SUBCUTANEOUS EVERY 5 MIN PRN
Status: CANCELLED | OUTPATIENT
Start: 2017-12-06

## 2017-11-14 RX ORDER — LORAZEPAM 2 MG/ML
0.5 INJECTION INTRAMUSCULAR EVERY 4 HOURS PRN
Status: CANCELLED
Start: 2017-11-15

## 2017-11-14 RX ORDER — DIPHENHYDRAMINE HYDROCHLORIDE 50 MG/ML
50 INJECTION INTRAMUSCULAR; INTRAVENOUS
Status: CANCELLED
Start: 2017-12-06

## 2017-11-14 RX ORDER — EPINEPHRINE 0.3 MG/.3ML
0.3 INJECTION SUBCUTANEOUS EVERY 5 MIN PRN
Status: CANCELLED | OUTPATIENT
Start: 2017-11-29

## 2017-11-14 RX ORDER — EPINEPHRINE 0.3 MG/.3ML
0.3 INJECTION SUBCUTANEOUS EVERY 5 MIN PRN
Status: CANCELLED | OUTPATIENT
Start: 2017-11-15

## 2017-11-14 NOTE — TELEPHONE ENCOUNTER
I spoke with Amira this morning after talking to Dr Frias.  She will go ahead and make an apt with urology to consult about the episode of urinary retention within the framework of her multiple myeloma

## 2017-11-15 ENCOUNTER — TELEPHONE (OUTPATIENT)
Dept: NURSING | Facility: CLINIC | Age: 82
End: 2017-11-15

## 2017-11-15 ENCOUNTER — HOSPITAL ENCOUNTER (OUTPATIENT)
Facility: CLINIC | Age: 82
Setting detail: SPECIMEN
Discharge: HOME | End: 2017-11-15
Attending: INTERNAL MEDICINE | Admitting: INTERNAL MEDICINE
Payer: MEDICARE

## 2017-11-15 ENCOUNTER — INFUSION THERAPY VISIT (OUTPATIENT)
Dept: INFUSION THERAPY | Facility: CLINIC | Age: 82
End: 2017-11-15
Attending: INTERNAL MEDICINE
Payer: MEDICARE

## 2017-11-15 VITALS
HEART RATE: 77 BPM | SYSTOLIC BLOOD PRESSURE: 129 MMHG | TEMPERATURE: 98.3 F | DIASTOLIC BLOOD PRESSURE: 72 MMHG | BODY MASS INDEX: 22.41 KG/M2 | RESPIRATION RATE: 16 BRPM | WEIGHT: 138.4 LBS

## 2017-11-15 DIAGNOSIS — I48.0 PAROXYSMAL ATRIAL FIBRILLATION (H): Primary | ICD-10-CM

## 2017-11-15 DIAGNOSIS — C90.00 MULTIPLE MYELOMA NOT HAVING ACHIEVED REMISSION (H): ICD-10-CM

## 2017-11-15 LAB
ALBUMIN SERPL-MCNC: 3.5 G/DL (ref 3.4–5)
ALP SERPL-CCNC: 50 U/L (ref 40–150)
ALT SERPL W P-5'-P-CCNC: 35 U/L (ref 0–50)
AST SERPL W P-5'-P-CCNC: 30 U/L (ref 0–45)
BASOPHILS # BLD AUTO: 0 10E9/L (ref 0–0.2)
BASOPHILS NFR BLD AUTO: 0 %
BILIRUB DIRECT SERPL-MCNC: 0.1 MG/DL (ref 0–0.2)
BILIRUB SERPL-MCNC: 0.6 MG/DL (ref 0.2–1.3)
DIFFERENTIAL METHOD BLD: ABNORMAL
EOSINOPHIL # BLD AUTO: 0 10E9/L (ref 0–0.7)
EOSINOPHIL NFR BLD AUTO: 0 %
ERYTHROCYTE [DISTWIDTH] IN BLOOD BY AUTOMATED COUNT: 13.8 % (ref 10–15)
HCT VFR BLD AUTO: 36.8 % (ref 35–47)
HGB BLD-MCNC: 12.6 G/DL (ref 11.7–15.7)
IMM GRANULOCYTES # BLD: 0 10E9/L (ref 0–0.4)
IMM GRANULOCYTES NFR BLD: 0.3 %
INR PPP: 2.46 (ref 0.86–1.14)
LYMPHOCYTES # BLD AUTO: 0.3 10E9/L (ref 0.8–5.3)
LYMPHOCYTES NFR BLD AUTO: 5.6 %
MCH RBC QN AUTO: 34.7 PG (ref 26.5–33)
MCHC RBC AUTO-ENTMCNC: 34.2 G/DL (ref 31.5–36.5)
MCV RBC AUTO: 101 FL (ref 78–100)
MONOCYTES # BLD AUTO: 0 10E9/L (ref 0–1.3)
MONOCYTES NFR BLD AUTO: 0.3 %
NEUTROPHILS # BLD AUTO: 5.7 10E9/L (ref 1.6–8.3)
NEUTROPHILS NFR BLD AUTO: 93.8 %
NRBC # BLD AUTO: 0 10*3/UL
NRBC BLD AUTO-RTO: 0 /100
PLATELET # BLD AUTO: 142 10E9/L (ref 150–450)
PROT SERPL-MCNC: 6.2 G/DL (ref 6.8–8.8)
RBC # BLD AUTO: 3.63 10E12/L (ref 3.8–5.2)
WBC # BLD AUTO: 6 10E9/L (ref 4–11)

## 2017-11-15 PROCEDURE — 96415 CHEMO IV INFUSION ADDL HR: CPT

## 2017-11-15 PROCEDURE — 96413 CHEMO IV INFUSION 1 HR: CPT

## 2017-11-15 PROCEDURE — 25000128 H RX IP 250 OP 636: Performed by: INTERNAL MEDICINE

## 2017-11-15 PROCEDURE — 83883 ASSAY NEPHELOMETRY NOT SPEC: CPT | Performed by: INTERNAL MEDICINE

## 2017-11-15 PROCEDURE — 96401 CHEMO ANTI-NEOPL SQ/IM: CPT

## 2017-11-15 PROCEDURE — 85610 PROTHROMBIN TIME: CPT | Performed by: INTERNAL MEDICINE

## 2017-11-15 PROCEDURE — 00000402 ZZHCL STATISTIC TOTAL PROTEIN: Performed by: INTERNAL MEDICINE

## 2017-11-15 PROCEDURE — 25000132 ZZH RX MED GY IP 250 OP 250 PS 637: Mod: GY | Performed by: INTERNAL MEDICINE

## 2017-11-15 PROCEDURE — 85025 COMPLETE CBC W/AUTO DIFF WBC: CPT | Performed by: INTERNAL MEDICINE

## 2017-11-15 PROCEDURE — 80076 HEPATIC FUNCTION PANEL: CPT | Performed by: INTERNAL MEDICINE

## 2017-11-15 PROCEDURE — 84165 PROTEIN E-PHORESIS SERUM: CPT | Performed by: INTERNAL MEDICINE

## 2017-11-15 PROCEDURE — A9270 NON-COVERED ITEM OR SERVICE: HCPCS | Mod: GY | Performed by: INTERNAL MEDICINE

## 2017-11-15 PROCEDURE — 96375 TX/PRO/DX INJ NEW DRUG ADDON: CPT

## 2017-11-15 RX ORDER — DIPHENHYDRAMINE HCL 25 MG
50 CAPSULE ORAL ONCE
Status: COMPLETED | OUTPATIENT
Start: 2017-11-15 | End: 2017-11-15

## 2017-11-15 RX ORDER — DEXAMETHASONE 4 MG/1
TABLET ORAL
Qty: 28 TABLET | Refills: 0 | Status: SHIPPED | OUTPATIENT
Start: 2017-11-15 | End: 2018-04-04

## 2017-11-15 RX ORDER — HEPARIN SODIUM (PORCINE) LOCK FLUSH IV SOLN 100 UNIT/ML 100 UNIT/ML
5 SOLUTION INTRAVENOUS EVERY 8 HOURS
Status: DISCONTINUED | OUTPATIENT
Start: 2017-11-15 | End: 2017-11-15 | Stop reason: HOSPADM

## 2017-11-15 RX ORDER — ACETAMINOPHEN 325 MG/1
650 TABLET ORAL ONCE
Status: COMPLETED | OUTPATIENT
Start: 2017-11-15 | End: 2017-11-15

## 2017-11-15 RX ADMIN — DARATUMUMAB 1000 MG: 100 INJECTION, SOLUTION, CONCENTRATE INTRAVENOUS at 11:58

## 2017-11-15 RX ADMIN — ACETAMINOPHEN 650 MG: 325 TABLET ORAL at 11:51

## 2017-11-15 RX ADMIN — SODIUM CHLORIDE, PRESERVATIVE FREE 5 ML: 5 INJECTION INTRAVENOUS at 15:15

## 2017-11-15 RX ADMIN — DEXAMETHASONE SODIUM PHOSPHATE 12 MG: 10 INJECTION, SOLUTION INTRAMUSCULAR; INTRAVENOUS at 11:53

## 2017-11-15 RX ADMIN — DIPHENHYDRAMINE HYDROCHLORIDE 50 MG: 25 CAPSULE ORAL at 11:51

## 2017-11-15 RX ADMIN — BORTEZOMIB 2.2 MG: 3.5 INJECTION, POWDER, LYOPHILIZED, FOR SOLUTION INTRAVENOUS; SUBCUTANEOUS at 13:55

## 2017-11-15 RX ADMIN — SODIUM CHLORIDE 250 ML: 9 INJECTION, SOLUTION INTRAVENOUS at 11:53

## 2017-11-15 ASSESSMENT — PAIN SCALES - GENERAL: PAINLEVEL: NO PAIN (0)

## 2017-11-15 NOTE — MR AVS SNAPSHOT
After Visit Summary   11/15/2017    Amira Arreola    MRN: [id number]           Patient Information     Date Of Birth          7/17/1932        Visit Information        Provider Department      11/15/2017 11:00 AM  INFUSION CHAIR 16 [facility]        Today's Diagnoses     Paroxysmal atrial fibrillation (H)    -  1    Multiple myeloma not having achieved remission (H)           Follow-ups after your visit        Your next 10 appointments already scheduled     Nov 22, 2017 11:00 AM CST   Level 2 with  INFUSION CHAIR 3   [facility] ([facility])    [facility]  [address]  [place] [address]   [phone]            Nov 29, 2017  9:30 AM CST   Level 2 with  INFUSION CHAIR 13   [facility] ([facility])    [facility]  [address]  [place] [address]   [phone]            Dec 06, 2017  1:30 PM CST   Level 2 with  INFUSION CHAIR 12   [facility] ([facility])    [facility]  [address]  [place] [address]   [phone]            Dec 06, 2017  2:00 PM CST   Return Visit with Shayne Roberts MD   [facility] ([facility])    [facility]  [address]  [place] [address]   [phone]              Who to contact     If you have questions or need follow up information about today's clinic visit or your schedule please contact [facility] directly at [phone].  Normal or non-critical lab and imaging results will be communicated to you by MyChart, letter or phone within 4 business days after the clinic has received the results. If you do not hear from us within 7 days, please contact the clinic through MyChart or phone. If  "you have a critical or abnormal lab result, we will notify you by phone as soon as possible.  Submit refill requests through [employer] or call your pharmacy and they will forward the refill request to us. Please allow 3 business days for your refill to be completed.          Additional Information About Your Visit        [employer] Information     [employer] lets you send messages to your doctor, view your test results, renew your prescriptions, schedule appointments and more. To sign up, go to [email or web address] . Click on \"Log in\" on the left side of the screen, which will take you to the Welcome page. Then click on \"Sign up Now\" on the right side of the page.     You will be asked to enter the access code listed below, as well as some personal information. Please follow the directions to create your username and password.     Your access code is: [email or web address]  Expires: 2017  9:24 AM     Your access code will  in 90 days. If you need help or a new code, please call your [place] clinic or [phone].        Care EveryWhere ID     This is your Care EveryWhere ID. This could be used by other organizations to access your [place] medical records  [id number]        Your Vitals Were     Pulse Temperature Respirations BMI (Body Mass Index)          77 98.3  F (36.8  C) (Oral) 16 22.41 kg/m2         Blood Pressure from Last 3 Encounters:   11/15/17 129/72   17 132/80   17 134/81    Weight from Last 3 Encounters:   11/15/17 62.8 kg (138 lb 6.4 oz)   17 62.1 kg (137 lb)   17 60.7 kg (133 lb 12.8 oz)              We Performed the Following     CBC with platelets differential     Hepatic panel     INR     Kappa and lambda light chain     Protein electrophoresis          Today's Medication Changes          These changes are accurate as of: 11/15/17  3:20 PM.  If you have any questions, ask your nurse or doctor.               These medicines have changed or have updated prescriptions.     "    Dose/Directions    * dexamethasone 4 MG tablet   Commonly known as:  DECADRON   This may have changed:  Another medication with the same name was added. Make sure you understand how and when to take each.   Used for:  Multiple myeloma not having achieved remission (H)        Take 20mg (5 tablets) PO every week on the morning of velcade injection. Then take 1 tablet (4mg) by mouth for two days after darzalex.   Quantity:  28 tablet   Refills:  0       * dexamethasone 4 MG tablet   Commonly known as:  DECADRON   This may have changed:  Another medication with the same name was added. Make sure you understand how and when to take each.   Used for:  Multiple myeloma not having achieved remission (H)        Take 20mg (5 tablets) by mouth every week on the morning of velcade injection.   Quantity:  28 tablet   Refills:  0       * dexamethasone 4 MG tablet   Commonly known as:  DECADRON   This may have changed:  You were already taking a medication with the same name, and this prescription was added. Make sure you understand how and when to take each.   Used for:  Multiple myeloma not having achieved remission (H)        Take 20mg (5 tablets) by mouth every week on the morning of velcade injection.   Quantity:  28 tablet   Refills:  0       * Notice:  This list has 3 medication(s) that are the same as other medications prescribed for you. Read the directions carefully, and ask your doctor or other care provider to review them with you.         Where to get your medicines      These medications were sent to [facility] [address] [facility], [place] [address] [facility] [address] AT [facility] [address]  [address], [place] [address]     Phone:  [phone]     dexamethasone 4 MG tablet                Primary Care Provider Office Phone # Fax #    Addy Frias [phone] [address] [facility] [address]  [facility] [address]        Equal Access to Services     SARA ZELAYA AH: Gissel Galloway,  rhonda iselaalexis, dahlia gupta, hung lancasteraamorteza ah. So [facility] [phone].    ATENCIÓN: Si feli park, tiene a barlow disposición servicios gratuitos de asistencia lingüística. Kadie al [phone].    We comply with applicable federal civil rights laws and [place] laws. We do not discriminate on the basis of race, color, national origin, age, disability, sex, sexual orientation, or gender identity.            Thank you!     Thank you for choosing [facility]  for your care. Our goal is always to provide you with excellent care. Hearing back from our patients is one way we can continue to improve our services. Please take a few minutes to complete the written survey that you may receive in the mail after your visit with us. Thank you!             Your Updated Medication List - Protect others around you: Learn how to safely use, store and throw away your medicines at www.disposemymeds.org.          This list is accurate as of: 11/15/17  3:20 PM.  Always use your most recent med list.                   Brand Name Dispense Instructions for use Diagnosis    acyclovir 400 MG tablet    ZOVIRAX    60 tablet    Take 1 tablet (400 mg) by mouth 2 times daily Viral Prophylaxis.    Multiple myeloma not having achieved remission (H)       CALCIUM CITRATE + PO      Take 2,000 mg by mouth daily 2 tabs        carboxymethylcellulose 0.5 % Soln ophthalmic solution    REFRESH PLUS     1 drop 4 times daily        CLARINEX PO      Take by mouth daily Taking claritin        COMPAZINE PO      Take 10 mg by mouth daily as needed        cycloSPORINE 0.05 % ophthalmic emulsion    RESTASIS     Place 1 drop into both eyes every 12 hours        DAILY MULTIVITAMIN PO      Take 1 tablet by mouth daily.    Routine general medical examination at a health care facility       * dexamethasone 4 MG tablet    DECADRON    28 tablet    Take 20mg (5 tablets) PO every week on the morning of  velcade injection. Then take 1 tablet (4mg) by mouth for two days after darzalex.    Multiple myeloma not having achieved remission (H)       * dexamethasone 4 MG tablet    DECADRON    28 tablet    Take 20mg (5 tablets) by mouth every week on the morning of velcade injection.    Multiple myeloma not having achieved remission (H)       * dexamethasone 4 MG tablet    DECADRON    28 tablet    Take 20mg (5 tablets) by mouth every week on the morning of velcade injection.    Multiple myeloma not having achieved remission (H)       erythromycin ophthalmic ointment    ROMYCIN     Place 1 Application into both eyes At Bedtime        GENTLE STOOL SOFTENER PO      Take 100 mg by mouth daily        lidocaine-prilocaine cream    EMLA    30 g    Apply topically as needed for moderate pain Apply dollop size amount to port site 30-60 min prior to accessing    Multiple myeloma not having achieved remission (H)       LORazepam 0.5 MG tablet    ATIVAN    30 tablet    Take 1 tablet (0.5 mg) by mouth every 8 hours as needed for anxiety    Multiple myeloma not having achieved remission (H)       metoprolol 50 MG 24 hr tablet    TOPROL XL    270 tablet    Take 2 tablets (100mg) in the morning and 1 tablet (50mg) in the evening by mouth daily    Paroxysmal atrial fibrillation (H)       oxyCODONE IR 15 MG tablet    ROXICODONE    60 tablet    Take 1 tablet (15 mg) by mouth every 8 hours as needed for pain maximum 4 tablet(s) per day    Multiple myeloma not having achieved remission (H)       polyethylene glycol powder    MIRALAX/GLYCOLAX     Take 1 capful by mouth daily as needed    Bilateral leg edema       timolol 0.25 % ophthalmic solution    TIMOPTIC     Place 1 drop into the right eye 2 times daily        TYLENOL PO      Take 500 mg by mouth every 6 hours as needed for mild pain or fever        UNABLE TO FIND      MEDICATION NAME: Fresh Coat eye drops        VITAMIN D3 PO      Take 1,000 Units by mouth daily        warfarin 4 MG tablet     COUMADIN    110 tablet    TAKE ONE AND ONE-HALF TABLETS BY MOUTH ON MONDAY, WEDNESDAY, AND FRIDAY AND ONE TABLET THE OTHER DAYS OF THE WEEK    Paroxysmal atrial fibrillation (H), Long-term (current) use of anticoagulants       ZOMETA IV      Inject into the vein every 30 days Every 3 month dosing        * Notice:  This list has 3 medication(s) that are the same as other medications prescribed for you. Read the directions carefully, and ask your doctor or other care provider to review them with you.

## 2017-11-15 NOTE — PROGRESS NOTES
Infusion Nursing Note:  Amira Arreola presents today for Daratumumab and velcade.    Patient seen by provider today: No   present during visit today: Not Applicable.    Note: Urinary retention last Friday after a fall. Catheter was placed in urgent care. Followed up with PCP on Monday and khan was removed. Patient able to urinate on her own. Patient plans to see urologist.    Intravenous Access:  Implanted Port.    Treatment Conditions:  Lab Results   Component Value Date    HGB 12.6 11/15/2017     Lab Results   Component Value Date    WBC 6.0 11/15/2017      Lab Results   Component Value Date    ANEU 5.7 11/15/2017     Lab Results   Component Value Date     11/15/2017      Results reviewed, labs MET treatment parameters, ok to proceed with treatment.    Post Infusion Assessment:Patient tolerated infusion without incident.  Patient tolerated injection without incident.  Blood return noted pre and post infusion.  Site patent and intact, free from redness, edema or discomfort.  No evidence of extravasations.  Access discontinued per protocol.    Discharge Plan:   Prescription refills for dexamethasone sent to [facility] pharmacy. Patient informed to pick them up.  Discharge instructions reviewed with: Patient.  Patient and/or family verbalized understanding of discharge instructions and all questions answered.  Copy of AVS reviewed with patient and/or family.  Patient will return 11/22/2017 for next appointment.  Patient discharged in stable condition accompanied by: self.  Departure Mode: Ambulatory.    Caitie Josue RN

## 2017-11-15 NOTE — TELEPHONE ENCOUNTER
Patient has weekly infusion and had her INR draw today at infusion center  INR resulted therapeutic at 2.46 today 11/15/17  Left message on home phone for patient to call clinic for dosing instruction    Dayana Barrera RN

## 2017-11-16 ENCOUNTER — TELEPHONE (OUTPATIENT)
Dept: FAMILY MEDICINE | Facility: CLINIC | Age: 82
End: 2017-11-16

## 2017-11-16 ENCOUNTER — ANTICOAGULATION THERAPY VISIT (OUTPATIENT)
Dept: NURSING | Facility: CLINIC | Age: 82
End: 2017-11-16

## 2017-11-16 DIAGNOSIS — Z79.01 LONG-TERM (CURRENT) USE OF ANTICOAGULANTS: ICD-10-CM

## 2017-11-16 LAB
ALBUMIN SERPL ELPH-MCNC: 3.8 G/DL (ref 3.7–5.1)
ALPHA1 GLOB SERPL ELPH-MCNC: 0.4 G/DL (ref 0.2–0.4)
ALPHA2 GLOB SERPL ELPH-MCNC: 0.7 G/DL (ref 0.5–0.9)
B-GLOBULIN SERPL ELPH-MCNC: 0.7 G/DL (ref 0.6–1)
GAMMA GLOB SERPL ELPH-MCNC: 0.3 G/DL (ref 0.7–1.6)
KAPPA LC UR-MCNC: 3.52 MG/DL (ref 0.33–1.94)
KAPPA LC/LAMBDA SER: 5.59 {RATIO} (ref 0.26–1.65)
LAMBDA LC SERPL-MCNC: 0.63 MG/DL (ref 0.57–2.63)
M PROTEIN SERPL ELPH-MCNC: 0.1 G/DL
PROT PATTERN SERPL ELPH-IMP: ABNORMAL

## 2017-11-16 NOTE — PROGRESS NOTES
ANTICOAGULATION FOLLOW-UP CLINIC VISIT    Patient Name:  Amira Arreola  Date:  11/16/2017  Contact Type:  Telephone    SUBJECTIVE:     Patient Findings     Positives No Problem Findings    Comments Episode of diarrhea on last Friday           OBJECTIVE    INR   Date Value Ref Range Status   11/15/2017 2.46 (H) 0.86 - 1.14 Final       ASSESSMENT / PLAN  INR assessment THER    Recheck INR In: 1 WEEK    INR Location Clinic      Anticoagulation Summary as of 11/16/2017     INR goal 2.0-3.0   Today's INR    Maintenance plan 6 mg (4 mg x 1.5) on Mon, Fri; 4 mg (4 mg x 1) all other days   Full instructions 6 mg on Mon, Fri; 4 mg all other days   Weekly total 32 mg   Plan last modified Dayana Barrera RN (9/14/2017)   Next INR check 11/22/2017   Target end date Indefinite    Indications   Long-term (current) use of anticoagulants [Z79.01] [Z79.01]  Atrial fibrillation (H) [I48.91] (Resolved) [I48.91]         Anticoagulation Episode Summary     INR check location     Preferred lab     Send INR reminders to [facility]    Comments patient have standing INR order to be draw at infusion visit.  advise to call [facility] when have INR draw for dosing instruction.  patient can be reach at home phone [phone]      Anticoagulation Care Providers     Provider Role Specialty Phone number    Addy Frias MD [facility] Internal Medicine [phone]            See the Encounter Report to view Anticoagulation Flowsheet and Dosing Calendar (Go to Encounters tab in chart review, and find the Anticoagulation Therapy Visit)    Dosage adjustment made based on physician directed care plan.    Patient has INR draw at infusion center at is therapeutic at 2.46.  Advised to continue with 6 mg on Mon, Fri;  4 mg all other days = 32 mg weekly.  Recheck in 1 week at infusion center    Dayana Barrera RN

## 2017-11-16 NOTE — TELEPHONE ENCOUNTER
Reason for Call:  Other returning call    Detailed comments: Pt has questions on her inr    Phone Number Patient can be reached at: Home number on file [phone] (home)    Best Time: anytime     Can we leave a detailed message on this number? NO    Call taken on 11/16/2017 at 7:27 AM by Joslyn Perez

## 2017-11-16 NOTE — MR AVS SNAPSHOT
Amira IVY Arreola   11/16/2017   Anticoagulation Therapy Visit    Description:  85 year old female   Provider:  Addy Frias MD   Department:  Ec Nurse           INR as of 11/16/2017     Today's INR 2.46 (11/15/2017)      Anticoagulation Summary as of 11/16/2017     INR goal 2.0-3.0   Today's INR 2.46 (11/15/2017)   Full instructions 6 mg on Mon, Fri; 4 mg all other days   Next INR check 11/22/2017    Indications   Long-term (current) use of anticoagulants [Z79.01] [Z79.01]  Atrial fibrillation (H) [I48.91] (Resolved) [I48.91]         Description     Patient has INR draw at infusion center at is therapeutic at 2.46.  Advised to continue with 6 mg on Mon, Fri;  4 mg all other days = 32 mg weekly.  Recheck in 1 week at infusion center      Contact Numbers     Clinic Number:         November 2017 Details    Sun Mon Tue Wed Thu Fri Sat        1               2               3               4                 5               6               7               8               9               10               11                 12               13               14               15               16      4 mg   See details      17      6 mg         18      4 mg           19      4 mg         20      6 mg         21      4 mg         22            23               24               25                 26               27               28               29               30                  Date Details   11/16 This INR check       Date of next INR:  11/22/2017         How to take your warfarin dose     To take:  4 mg Take 1 of the 4 mg tablets.    To take:  6 mg Take 1.5 of the 4 mg tablets.

## 2017-11-17 RX ORDER — ZOLEDRONIC ACID 0.04 MG/ML
4 INJECTION, SOLUTION INTRAVENOUS ONCE
Status: CANCELLED | OUTPATIENT
Start: 2018-02-07 | End: 2018-02-07

## 2017-11-22 ENCOUNTER — INFUSION THERAPY VISIT (OUTPATIENT)
Dept: INFUSION THERAPY | Facility: CLINIC | Age: 82
End: 2017-11-22
Attending: INTERNAL MEDICINE
Payer: MEDICARE

## 2017-11-22 ENCOUNTER — ANTICOAGULATION THERAPY VISIT (OUTPATIENT)
Dept: FAMILY MEDICINE | Facility: CLINIC | Age: 82
End: 2017-11-22
Payer: COMMERCIAL

## 2017-11-22 ENCOUNTER — HOSPITAL ENCOUNTER (OUTPATIENT)
Facility: CLINIC | Age: 82
Setting detail: SPECIMEN
Discharge: HOME | End: 2017-11-22
Attending: INTERNAL MEDICINE | Admitting: INTERNAL MEDICINE
Payer: MEDICARE

## 2017-11-22 VITALS
SYSTOLIC BLOOD PRESSURE: 127 MMHG | HEIGHT: 66 IN | RESPIRATION RATE: 16 BRPM | TEMPERATURE: 98.3 F | HEART RATE: 88 BPM | BODY MASS INDEX: 22.02 KG/M2 | OXYGEN SATURATION: 98 % | WEIGHT: 137 LBS | DIASTOLIC BLOOD PRESSURE: 76 MMHG

## 2017-11-22 DIAGNOSIS — C90.00 MULTIPLE MYELOMA NOT HAVING ACHIEVED REMISSION (H): Primary | ICD-10-CM

## 2017-11-22 DIAGNOSIS — Z95.828 PORTACATH IN PLACE: ICD-10-CM

## 2017-11-22 DIAGNOSIS — I48.0 PAROXYSMAL ATRIAL FIBRILLATION (H): ICD-10-CM

## 2017-11-22 DIAGNOSIS — Z79.01 LONG-TERM (CURRENT) USE OF ANTICOAGULANTS: ICD-10-CM

## 2017-11-22 LAB
BASOPHILS # BLD AUTO: 0 10E9/L (ref 0–0.2)
BASOPHILS NFR BLD AUTO: 0.1 %
DIFFERENTIAL METHOD BLD: ABNORMAL
EOSINOPHIL # BLD AUTO: 0 10E9/L (ref 0–0.7)
EOSINOPHIL NFR BLD AUTO: 0.1 %
ERYTHROCYTE [DISTWIDTH] IN BLOOD BY AUTOMATED COUNT: 14 % (ref 10–15)
HCT VFR BLD AUTO: 37.1 % (ref 35–47)
HGB BLD-MCNC: 12.6 G/DL (ref 11.7–15.7)
IMM GRANULOCYTES # BLD: 0 10E9/L (ref 0–0.4)
IMM GRANULOCYTES NFR BLD: 0.4 %
INR PPP: 2.16 (ref 0.86–1.14)
LYMPHOCYTES # BLD AUTO: 0.4 10E9/L (ref 0.8–5.3)
LYMPHOCYTES NFR BLD AUTO: 4.7 %
MCH RBC QN AUTO: 34.4 PG (ref 26.5–33)
MCHC RBC AUTO-ENTMCNC: 34 G/DL (ref 31.5–36.5)
MCV RBC AUTO: 101 FL (ref 78–100)
MONOCYTES # BLD AUTO: 0.2 10E9/L (ref 0–1.3)
MONOCYTES NFR BLD AUTO: 2.8 %
NEUTROPHILS # BLD AUTO: 7.6 10E9/L (ref 1.6–8.3)
NEUTROPHILS NFR BLD AUTO: 91.9 %
NRBC # BLD AUTO: 0 10*3/UL
NRBC BLD AUTO-RTO: 0 /100
PLATELET # BLD AUTO: 148 10E9/L (ref 150–450)
RBC # BLD AUTO: 3.66 10E12/L (ref 3.8–5.2)
WBC # BLD AUTO: 8.2 10E9/L (ref 4–11)

## 2017-11-22 PROCEDURE — 99207 ZZC NO CHARGE NURSE ONLY: CPT | Performed by: INTERNAL MEDICINE

## 2017-11-22 PROCEDURE — 25000128 H RX IP 250 OP 636: Performed by: INTERNAL MEDICINE

## 2017-11-22 PROCEDURE — 96401 CHEMO ANTI-NEOPL SQ/IM: CPT

## 2017-11-22 PROCEDURE — 85610 PROTHROMBIN TIME: CPT | Performed by: INTERNAL MEDICINE

## 2017-11-22 PROCEDURE — 85025 COMPLETE CBC W/AUTO DIFF WBC: CPT | Performed by: INTERNAL MEDICINE

## 2017-11-22 RX ORDER — HEPARIN SODIUM (PORCINE) LOCK FLUSH IV SOLN 100 UNIT/ML 100 UNIT/ML
500 SOLUTION INTRAVENOUS EVERY 8 HOURS
Status: CANCELLED
Start: 2017-11-22

## 2017-11-22 RX ORDER — HEPARIN SODIUM (PORCINE) LOCK FLUSH IV SOLN 100 UNIT/ML 100 UNIT/ML
500 SOLUTION INTRAVENOUS EVERY 8 HOURS
Status: DISCONTINUED | OUTPATIENT
Start: 2017-11-22 | End: 2017-11-22 | Stop reason: HOSPADM

## 2017-11-22 RX ADMIN — SODIUM CHLORIDE, PRESERVATIVE FREE 500 UNITS: 5 INJECTION INTRAVENOUS at 11:40

## 2017-11-22 RX ADMIN — BORTEZOMIB 2.2 MG: 3.5 INJECTION, POWDER, LYOPHILIZED, FOR SOLUTION INTRAVENOUS; SUBCUTANEOUS at 12:29

## 2017-11-22 ASSESSMENT — PAIN SCALES - GENERAL: PAINLEVEL: NO PAIN (0)

## 2017-11-22 NOTE — MR AVS SNAPSHOT
Amira IVY Arreola   11/22/2017   Anticoagulation Therapy Visit    Description:  85 year old female   Provider:  Addy Frias MD   Department:  [facility]           INR as of 11/22/2017     Today's INR 2.16      Anticoagulation Summary as of 11/22/2017     INR goal 2.0-3.0   Today's INR 2.16   Full instructions 6 mg on Mon, Fri; 4 mg all other days   Next INR check 11/29/2017    Indications   Long-term (current) use of anticoagulants [Z79.01] [Z79.01]  Atrial fibrillation (H) [I48.91] (Resolved) [I48.91]         November 2017 Details    Sun Mon Tue Wed Thu Fri Sat        1               2               3               4                 5               6               7               8               9               10               11                 12               13               14               15               16               17               18                 19               20               21               22      4 mg   See details      23      4 mg         24      6 mg         25      4 mg           26      4 mg         27      6 mg         28      4 mg         29            30                  Date Details   11/22 This INR check       Date of next INR:  11/29/2017         How to take your warfarin dose     To take:  4 mg Take 1 of the 4 mg tablets.    To take:  6 mg Take 1.5 of the 4 mg tablets.

## 2017-11-22 NOTE — PROGRESS NOTES
Infusion Nursing Note:  Amira Arreola presents today for C7D8 Velcade.    Patient seen by provider today: No   present during visit today: Not Applicable.    Note: N/A.    Intravenous Access:  Labs drawn without difficulty.  Implanted Port.    Treatment Conditions:  Lab Results   Component Value Date    HGB 12.6 11/22/2017     Lab Results   Component Value Date    WBC 8.2 11/22/2017      Lab Results   Component Value Date    ANEU 7.6 11/22/2017     Lab Results   Component Value Date     11/22/2017      Results reviewed, labs MET treatment parameters, ok to proceed with treatment.        Post Infusion Assessment:  Patient tolerated injection without incident.  Site patent and intact, free from redness, edema or discomfort.  Access discontinued per protocol.    Discharge Plan:   Prescription refills given for acyclovir.  Discharge instructions reviewed with: Patient.  Patient and/or family verbalized understanding of discharge instructions and all questions answered.  Copy of AVS reviewed with patient and/or family.  Patient will return 11/29/17 for next appointment.  Patient discharged in stable condition accompanied by: self.  Departure Mode: Ambulatory.    SHELLIE Patel RN

## 2017-11-22 NOTE — PROGRESS NOTES
ANTICOAGULATION FOLLOW-UP CLINIC VISIT    Patient Name:  Amira Arreola  Date:  11/22/2017  Contact Type:  Telephone    SUBJECTIVE:     Patient Findings     Positives No Problem Findings           OBJECTIVE    INR   Date Value Ref Range Status   11/22/2017 2.16 (H) 0.86 - 1.14 Final       ASSESSMENT / PLAN  INR assessment THER    Recheck INR In: 1 WEEK    INR Location Clinic      Anticoagulation Summary as of 11/22/2017     INR goal 2.0-3.0   Today's INR 2.16   Maintenance plan 6 mg (4 mg x 1.5) on Mon, Fri; 4 mg (4 mg x 1) all other days   Full instructions 6 mg on Mon, Fri; 4 mg all other days   Weekly total 32 mg   Plan last modified Dayana Barrera RN (9/14/2017)   Next INR check 11/29/2017   Target end date Indefinite    Indications   Long-term (current) use of anticoagulants [Z79.01] [Z79.01]  Atrial fibrillation (H) [I48.91] (Resolved) [I48.91]         Anticoagulation Episode Summary     INR check location     Preferred lab     Send INR reminders to [facility]    Comments patient have standing INR order to be draw at infusion visit.  advise to call [facility] when have INR draw for dosing instruction.  patient can be reach at home phone [phone]      Anticoagulation Care Providers     Provider Role Specialty Phone number    Addy Frias MD [facility] Internal Medicine [phone]            See the Encounter Report to view Anticoagulation Flowsheet and Dosing Calendar (Go to Encounters tab in chart review, and find the Anticoagulation Therapy Visit)    Dosage adjustment made based on physician directed care plan.  Left VM for patient with dosing instructions  Advised she callback with questions/concerns     Michelle Pinon RN

## 2017-11-22 NOTE — MR AVS SNAPSHOT
After Visit Summary   11/22/2017    Amira Arreola    MRN: [id number]           Patient Information     Date Of Birth          7/17/1932        Visit Information        Provider Department      11/22/2017 11:00 AM  INFUSION CHAIR 3 [facility]        Today's Diagnoses     Multiple myeloma not having achieved remission (H)    -  1    Paroxysmal atrial fibrillation (H)        Portacath in place           Follow-ups after your visit        Your next 10 appointments already scheduled     Nov 29, 2017  9:30 AM CST   Level 2 with  INFUSION CHAIR 13   [facility] ([facility])    [facility]  [address]  [place] [address]   [phone]            Dec 06, 2017  1:30 PM CST   Level 2 with  INFUSION CHAIR 12   [facility] ([facility])    [facility]  [address]  [place] [address]   [phone]            Dec 06, 2017  2:00 PM CST   Return Visit with Shayne Roberts MD   [facility] ([facility])    [facility]  [address]  [place] [address]   [phone]              Who to contact     If you have questions or need follow up information about today's clinic visit or your schedule please contact [facility] directly at [phone].  Normal or non-critical lab and imaging results will be communicated to you by MyChart, letter or phone within 4 business days after the clinic has received the results. If you do not hear from us within 7 days, please contact the clinic through MyChart or phone. If you have a critical or abnormal lab result, we will notify you by phone as soon as possible.  Submit refill requests through NoDaysOff or call your pharmacy and they will forward the refill request to us. Please allow 3  "business days for your refill to be completed.          Additional Information About Your Visit        MyChart Information     Demandware lets you send messages to your doctor, view your test results, renew your prescriptions, schedule appointments and more. To sign up, go to [email or web address] . Click on \"Log in\" on the left side of the screen, which will take you to the Welcome page. Then click on \"Sign up Now\" on the right side of the page.     You will be asked to enter the access code listed below, as well as some personal information. Please follow the directions to create your username and password.     Your access code is: [email or web address]  Expires: 2017  9:24 AM     Your access code will  in 90 days. If you need help or a new code, please call your [facility] clinic or [phone].        Care EveryWhere ID     This is your [facility] EveryWhere ID. This could be used by other organizations to access your [facility] medical records  [id number]        Your Vitals Were     Pulse Temperature Respirations Height Pulse Oximetry BMI (Body Mass Index)    88 98.3  F (36.8  C) (Oral) 16 1.676 m (5' 6\") 98% 22.11 kg/m2       Blood Pressure from Last 3 Encounters:   17 127/76   11/15/17 129/72   17 132/80    Weight from Last 3 Encounters:   17 62.1 kg (137 lb)   11/15/17 62.8 kg (138 lb 6.4 oz)   17 62.1 kg (137 lb)              We Performed the Following     CBC with platelets differential     INR        Primary Care Provider Office Phone # Fax #    Addy Frias [phone] [address]  [place] [address]        Equal Access to Services     HARINI ZELAYA : Gissel Galloway, wajanetda luqalexis, qaybta kaalmakira gupta, hung sadler. So [facility] [phone].    ATENCIÓN: Si habla español, tiene a barlow disposición servicios gratuitos de asistencia lingüística. Kadie al [phone].    We comply with applicable federal " civil rights laws and [place] laws. We do not discriminate on the basis of race, color, national origin, age, disability, sex, sexual orientation, or gender identity.            Thank you!     Thank you for choosing [facility]  for your care. Our goal is always to provide you with excellent care. Hearing back from our patients is one way we can continue to improve our services. Please take a few minutes to complete the written survey that you may receive in the mail after your visit with us. Thank you!             Your Updated Medication List - Protect others around you: Learn how to safely use, store and throw away your medicines at www.disposemymeds.org.          This list is accurate as of: 11/22/17 12:34 PM.  Always use your most recent med list.                   Brand Name Dispense Instructions for use Diagnosis    acyclovir 400 MG tablet    ZOVIRAX    60 tablet    Take 1 tablet (400 mg) by mouth 2 times daily Viral Prophylaxis.    Multiple myeloma not having achieved remission (H)       CALCIUM CITRATE + PO      Take 2,000 mg by mouth daily 2 tabs        carboxymethylcellulose 0.5 % Soln ophthalmic solution    REFRESH PLUS     1 drop 4 times daily        CLARINEX PO      Take by mouth daily Taking claritin        COMPAZINE PO      Take 10 mg by mouth daily as needed        cycloSPORINE 0.05 % ophthalmic emulsion    RESTASIS     Place 1 drop into both eyes every 12 hours        DAILY MULTIVITAMIN PO      Take 1 tablet by mouth daily.    Routine general medical examination at a health care facility       * dexamethasone 4 MG tablet    DECADRON    28 tablet    Take 20mg (5 tablets) PO every week on the morning of velcade injection. Then take 1 tablet (4mg) by mouth for two days after darzalex.    Multiple myeloma not having achieved remission (H)       * dexamethasone 4 MG tablet    DECADRON    28 tablet    Take 20mg (5 tablets) by mouth every week on the morning of velcade  injection.    Multiple myeloma not having achieved remission (H)       * dexamethasone 4 MG tablet    DECADRON    28 tablet    Take 20mg (5 tablets) by mouth every week on the morning of velcade injection.    Multiple myeloma not having achieved remission (H)       erythromycin ophthalmic ointment    ROMYCIN     Place 1 Application into both eyes At Bedtime        GENTLE STOOL SOFTENER PO      Take 100 mg by mouth daily        lidocaine-prilocaine cream    EMLA    30 g    Apply topically as needed for moderate pain Apply dollop size amount to port site 30-60 min prior to accessing    Multiple myeloma not having achieved remission (H)       LORazepam 0.5 MG tablet    ATIVAN    30 tablet    Take 1 tablet (0.5 mg) by mouth every 8 hours as needed for anxiety    Multiple myeloma not having achieved remission (H)       metoprolol 50 MG 24 hr tablet    TOPROL XL    270 tablet    Take 2 tablets (100mg) in the morning and 1 tablet (50mg) in the evening by mouth daily    Paroxysmal atrial fibrillation (H)       oxyCODONE IR 15 MG tablet    ROXICODONE    60 tablet    Take 1 tablet (15 mg) by mouth every 8 hours as needed for pain maximum 4 tablet(s) per day    Multiple myeloma not having achieved remission (H)       polyethylene glycol powder    MIRALAX/GLYCOLAX     Take 1 capful by mouth daily as needed    Bilateral leg edema       timolol 0.25 % ophthalmic solution    TIMOPTIC     Place 1 drop into the right eye 2 times daily        TYLENOL PO      Take 500 mg by mouth every 6 hours as needed for mild pain or fever        UNABLE TO FIND      MEDICATION NAME: Fresh Coat eye drops        VITAMIN D3 PO      Take 1,000 Units by mouth daily        warfarin 4 MG tablet    COUMADIN    110 tablet    TAKE ONE AND ONE-HALF TABLETS BY MOUTH ON MONDAY, WEDNESDAY, AND FRIDAY AND ONE TABLET THE OTHER DAYS OF THE WEEK    Paroxysmal atrial fibrillation (H), Long-term (current) use of anticoagulants       ZOMETA IV      Inject into the vein  every 30 days Every 3 month dosing        * Notice:  This list has 3 medication(s) that are the same as other medications prescribed for you. Read the directions carefully, and ask your doctor or other care provider to review them with you.

## 2017-11-23 ENCOUNTER — NURSE TRIAGE (OUTPATIENT)
Dept: NURSING | Facility: CLINIC | Age: 82
End: 2017-11-23

## 2017-11-23 NOTE — TELEPHONE ENCOUNTER
Amira is phoning to get results from INR yesterday at [facility].  FNA relayed results to patient.

## 2017-11-29 ENCOUNTER — ANTICOAGULATION THERAPY VISIT (OUTPATIENT)
Dept: FAMILY MEDICINE | Facility: CLINIC | Age: 82
End: 2017-11-29
Payer: COMMERCIAL

## 2017-11-29 ENCOUNTER — INFUSION THERAPY VISIT (OUTPATIENT)
Dept: INFUSION THERAPY | Facility: CLINIC | Age: 82
End: 2017-11-29
Attending: INTERNAL MEDICINE
Payer: MEDICARE

## 2017-11-29 ENCOUNTER — HOSPITAL ENCOUNTER (OUTPATIENT)
Facility: CLINIC | Age: 82
Setting detail: SPECIMEN
Discharge: HOME | End: 2017-11-29
Attending: INTERNAL MEDICINE | Admitting: INTERNAL MEDICINE
Payer: MEDICARE

## 2017-11-29 VITALS
HEART RATE: 90 BPM | SYSTOLIC BLOOD PRESSURE: 122 MMHG | TEMPERATURE: 98.6 F | DIASTOLIC BLOOD PRESSURE: 71 MMHG | WEIGHT: 135 LBS | BODY MASS INDEX: 21.69 KG/M2 | RESPIRATION RATE: 14 BRPM | HEIGHT: 66 IN

## 2017-11-29 DIAGNOSIS — C90.00 MULTIPLE MYELOMA NOT HAVING ACHIEVED REMISSION (H): Primary | ICD-10-CM

## 2017-11-29 DIAGNOSIS — I48.0 PAROXYSMAL ATRIAL FIBRILLATION (H): ICD-10-CM

## 2017-11-29 DIAGNOSIS — Z79.01 LONG-TERM (CURRENT) USE OF ANTICOAGULANTS: ICD-10-CM

## 2017-11-29 LAB
BASOPHILS # BLD AUTO: 0 10E9/L (ref 0–0.2)
BASOPHILS NFR BLD AUTO: 0 %
DIFFERENTIAL METHOD BLD: ABNORMAL
EOSINOPHIL # BLD AUTO: 0 10E9/L (ref 0–0.7)
EOSINOPHIL NFR BLD AUTO: 0 %
ERYTHROCYTE [DISTWIDTH] IN BLOOD BY AUTOMATED COUNT: 14.1 % (ref 10–15)
HCT VFR BLD AUTO: 37.5 % (ref 35–47)
HGB BLD-MCNC: 12.7 G/DL (ref 11.7–15.7)
IMM GRANULOCYTES # BLD: 0 10E9/L (ref 0–0.4)
IMM GRANULOCYTES NFR BLD: 0.4 %
INR PPP: 1.74 (ref 0.86–1.14)
LYMPHOCYTES # BLD AUTO: 0.4 10E9/L (ref 0.8–5.3)
LYMPHOCYTES NFR BLD AUTO: 6.9 %
MCH RBC QN AUTO: 34.3 PG (ref 26.5–33)
MCHC RBC AUTO-ENTMCNC: 33.9 G/DL (ref 31.5–36.5)
MCV RBC AUTO: 101 FL (ref 78–100)
MONOCYTES # BLD AUTO: 0.1 10E9/L (ref 0–1.3)
MONOCYTES NFR BLD AUTO: 1.6 %
NEUTROPHILS # BLD AUTO: 5 10E9/L (ref 1.6–8.3)
NEUTROPHILS NFR BLD AUTO: 91.1 %
NRBC # BLD AUTO: 0 10*3/UL
NRBC BLD AUTO-RTO: 0 /100
PLATELET # BLD AUTO: 162 10E9/L (ref 150–450)
RBC # BLD AUTO: 3.7 10E12/L (ref 3.8–5.2)
WBC # BLD AUTO: 5.5 10E9/L (ref 4–11)

## 2017-11-29 PROCEDURE — 85025 COMPLETE CBC W/AUTO DIFF WBC: CPT | Performed by: INTERNAL MEDICINE

## 2017-11-29 PROCEDURE — 25000128 H RX IP 250 OP 636: Performed by: INTERNAL MEDICINE

## 2017-11-29 PROCEDURE — 96401 CHEMO ANTI-NEOPL SQ/IM: CPT

## 2017-11-29 PROCEDURE — 85610 PROTHROMBIN TIME: CPT | Performed by: INTERNAL MEDICINE

## 2017-11-29 PROCEDURE — 99207 ZZC NO CHARGE NURSE ONLY: CPT | Performed by: INTERNAL MEDICINE

## 2017-11-29 RX ADMIN — BORTEZOMIB 2.2 MG: 3.5 INJECTION, POWDER, LYOPHILIZED, FOR SOLUTION INTRAVENOUS; SUBCUTANEOUS at 11:26

## 2017-11-29 ASSESSMENT — PAIN SCALES - GENERAL: PAINLEVEL: MODERATE PAIN (4)

## 2017-11-29 NOTE — PROGRESS NOTES
ANTICOAGULATION FOLLOW-UP CLINIC VISIT    Patient Name:  Amira Arreola  Date:  11/29/2017  Contact Type:  Telephone/ Dose instructions left on patient's voice mail    SUBJECTIVE:     Patient Findings     Positives No Problem Findings           OBJECTIVE    INR   Date Value Ref Range Status   11/29/2017 1.74 (H) 0.86 - 1.14 Final       ASSESSMENT / PLAN  INR assessment SUB    Recheck INR In: 1 WEEK    INR Location Outside lab IV infusion     Anticoagulation Summary as of 11/29/2017     INR goal 2.0-3.0   Today's INR 1.74!   Maintenance plan 6 mg (4 mg x 1.5) on Mon, Wed, Fri; 4 mg (4 mg x 1) all other days   Full instructions 6 mg on Mon, Wed, Fri; 4 mg all other days   Weekly total 34 mg   Plan last modified Cintia Powers RN (11/29/2017)   Next INR check 12/6/2017   Target end date Indefinite    Indications   Long-term (current) use of anticoagulants [Z79.01] [Z79.01]  Atrial fibrillation (H) [I48.91] (Resolved) [I48.91]         Anticoagulation Episode Summary     INR check location     Preferred lab     Send INR reminders to [facility]    Comments patient have standing INR order to be draw at infusion visit.  advise to call [facility] when have INR draw for dosing instruction.  patient can be reach at home phone [phone]      Anticoagulation Care Providers     Provider Role Specialty Phone number    Addy Frias MD [facility] Internal Medicine [phone]            See the Encounter Report to view Anticoagulation Flowsheet and Dosing Calendar (Go to Encounters tab in chart review, and find the Anticoagulation Therapy Visit)    Dosage adjustment made based on physician directed care plan.    Cintia Powers RN

## 2017-11-29 NOTE — MR AVS SNAPSHOT
After Visit Summary   11/29/2017    Amira Arreola    MRN: [id number]           Patient Information     Date Of Birth          7/17/1932        Visit Information        Provider Department      11/29/2017 9:30 AM  INFUSION CHAIR 13 [facility]        Today's Diagnoses     Multiple myeloma not having achieved remission (H)    -  1    Paroxysmal atrial fibrillation (H)           Follow-ups after your visit        Follow-up notes from your care team     Return in about 7 days (around 12/6/2017).      Your next 10 appointments already scheduled     Dec 06, 2017  1:30 PM CST   Level 2 with  INFUSION CHAIR 12   [facility] ([facility]    [facility]  [address]  [facility] [address] [phone]            Dec 06, 2017  2:00 PM CST   Return Visit with Shayne Roberts MD   [facility] ([facility])    [facility]  [address]  [facility] [address]   [phone]              Who to contact     If you have questions or need follow up information about today's clinic visit or your schedule please contact [facility] directly at [phone].  Normal or non-critical lab and imaging results will be communicated to you by CloudCasehart, letter or phone within 4 business days after the clinic has received the results. If you do not hear from us within 7 days, please contact the clinic through CloudCasehart or phone. If you have a critical or abnormal lab result, we will notify you by phone as soon as possible.  Submit refill requests through 8hands or call your pharmacy and they will forward the refill request to us. Please allow 3 business days for your refill to be completed.          Additional Information About Your Visit        CloudCaseharGetOne Rewards Information     8hands lets you send messages to your doctor, view your  "test results, renew your prescriptions, schedule appointments and more. To sign up, go to [email or web address] . Click on \"Log in\" on the left side of the screen, which will take you to the Welcome page. Then click on \"Sign up Now\" on the right side of the page.     You will be asked to enter the access code listed below, as well as some personal information. Please follow the directions to create your username and password.     Your access code is: [email or web address]  Expires: 2017  9:24 AM     Your access code will  in 90 days. If you need help or a new code, please call your [place] clinic or [phone].        Care EveryWhere ID     This is your Care EveryWhere ID. This could be used by other organizations to access your [place] medical records  [id number]        Your Vitals Were     Pulse Temperature Respirations Height BMI (Body Mass Index)       90 98.6  F (37  C) (Oral) 14 1.676 m (5' 5.98\") 21.8 kg/m2        Blood Pressure from Last 3 Encounters:   17 122/71   17 127/76   11/15/17 129/72    Weight from Last 3 Encounters:   17 61.2 kg (135 lb)   17 62.1 kg (137 lb)   11/15/17 62.8 kg (138 lb 6.4 oz)              We Performed the Following     CBC with platelets differential     INR        Primary Care Provider Office Phone # Fax #    Addy Frias [phone] [address]  [facility] [address]        Equal Access to Services     [facility] Center[facility]AB AH: Hadii liane Galloway, rhonda gutierrez, hung albert. So [facility] [phone].    ATENCIÓN: Si habla español, tiene a barlow disposición servicios gratuitos de asistencia lingüística. Kadie al [phone].    We comply with applicable federal civil rights laws and [place] laws. We do not discriminate on the basis of race, color, national origin, age, disability, sex, sexual orientation, or gender identity.            Thank you!     " Thank you for choosing [facility]  for your care. Our goal is always to provide you with excellent care. Hearing back from our patients is one way we can continue to improve our services. Please take a few minutes to complete the written survey that you may receive in the mail after your visit with us. Thank you!             Your Updated Medication List - Protect others around you: Learn how to safely use, store and throw away your medicines at www.disposemymeds.org.          This list is accurate as of: 11/29/17 12:04 PM.  Always use your most recent med list.                   Brand Name Dispense Instructions for use Diagnosis    acyclovir 400 MG tablet    ZOVIRAX    60 tablet    Take 1 tablet (400 mg) by mouth 2 times daily Viral Prophylaxis.    Multiple myeloma not having achieved remission (H)       CALCIUM CITRATE + PO      Take 2,000 mg by mouth daily 2 tabs        carboxymethylcellulose 0.5 % Soln ophthalmic solution    REFRESH PLUS     1 drop 4 times daily        CLARINEX PO      Take by mouth daily Taking claritin        COMPAZINE PO      Take 10 mg by mouth daily as needed        cycloSPORINE 0.05 % ophthalmic emulsion    RESTASIS     Place 1 drop into both eyes every 12 hours        DAILY MULTIVITAMIN PO      Take 1 tablet by mouth daily.    Routine general medical examination at a health care facility       * dexamethasone 4 MG tablet    DECADRON    28 tablet    Take 20mg (5 tablets) PO every week on the morning of velcade injection. Then take 1 tablet (4mg) by mouth for two days after darzalex.    Multiple myeloma not having achieved remission (H)       * dexamethasone 4 MG tablet    DECADRON    28 tablet    Take 20mg (5 tablets) by mouth every week on the morning of velcade injection.    Multiple myeloma not having achieved remission (H)       * dexamethasone 4 MG tablet    DECADRON    28 tablet    Take 20mg (5 tablets) by mouth every week on the morning of velcade  injection.    Multiple myeloma not having achieved remission (H)       erythromycin ophthalmic ointment    ROMYCIN     Place 1 Application into both eyes At Bedtime        GENTLE STOOL SOFTENER PO      Take 100 mg by mouth daily        lidocaine-prilocaine cream    EMLA    30 g    Apply topically as needed for moderate pain Apply dollop size amount to port site 30-60 min prior to accessing    Multiple myeloma not having achieved remission (H)       LORazepam 0.5 MG tablet    ATIVAN    30 tablet    Take 1 tablet (0.5 mg) by mouth every 8 hours as needed for anxiety    Multiple myeloma not having achieved remission (H)       metoprolol 50 MG 24 hr tablet    TOPROL XL    270 tablet    Take 2 tablets (100mg) in the morning and 1 tablet (50mg) in the evening by mouth daily    Paroxysmal atrial fibrillation (H)       oxyCODONE IR 15 MG tablet    ROXICODONE    60 tablet    Take 1 tablet (15 mg) by mouth every 8 hours as needed for pain maximum 4 tablet(s) per day    Multiple myeloma not having achieved remission (H)       polyethylene glycol powder    MIRALAX/GLYCOLAX     Take 1 capful by mouth daily as needed    Bilateral leg edema       timolol 0.25 % ophthalmic solution    TIMOPTIC     Place 1 drop into the right eye 2 times daily        TYLENOL PO      Take 500 mg by mouth every 6 hours as needed for mild pain or fever        UNABLE TO FIND      MEDICATION NAME: Fresh Coat eye drops        VITAMIN D3 PO      Take 1,000 Units by mouth daily        warfarin 4 MG tablet    COUMADIN    110 tablet    TAKE ONE AND ONE-HALF TABLETS BY MOUTH ON MONDAY, WEDNESDAY, AND FRIDAY AND ONE TABLET THE OTHER DAYS OF THE WEEK    Paroxysmal atrial fibrillation (H), Long-term (current) use of anticoagulants       ZOMETA IV      Inject into the vein every 30 days Every 3 month dosing        * Notice:  This list has 3 medication(s) that are the same as other medications prescribed for you. Read the directions carefully, and ask your doctor  or other care provider to review them with you.

## 2017-11-29 NOTE — MR AVS SNAPSHOT
Amira Arreola   11/29/2017   Anticoagulation Therapy Visit    Description:  85 year old female   Provider:  Addy Frias MD   Department:  [facility]           INR as of 11/29/2017     Today's INR 1.74!      Anticoagulation Summary as of 11/29/2017     INR goal 2.0-3.0   Today's INR 1.74!   Full instructions 6 mg on Mon, Wed, Fri; 4 mg all other days   Next INR check 12/6/2017    Indications   Long-term (current) use of anticoagulants [Z79.01] [Z79.01]  Atrial fibrillation (H) [I48.91] (Resolved) [I48.91]         November 2017 Details    Sun Mon Tue Wed Thu Fri Sat        1               2               3               4                 5               6               7               8               9               10               11                 12               13               14               15               16               17               18                 19               20               21               22               23               24               25                 26               27               28               29      6 mg   See details      30      4 mg            Date Details   11/29 This INR check               How to take your warfarin dose     To take:  4 mg Take 1 of the 4 mg tablets.    To take:  6 mg Take 1.5 of the 4 mg tablets.           December 2017 Details    Sun Mon Tue Wed Thu Fri Sat          1      6 mg         2      4 mg           3      4 mg         4      6 mg         5      4 mg         6            7               8               9                 10               11               12               13               14               15               16                 17               18               19               20               21               22               23                 24               25               26               27               28               29               30                 31                      Date Details    No additional details    Date of next INR:  12/6/2017         How to take your warfarin dose     To take:  4 mg Take 1 of the 4 mg tablets.    To take:  6 mg Take 1.5 of the 4 mg tablets.

## 2017-11-29 NOTE — PROGRESS NOTES
Infusion Nursing Note:  Amira Arreola presents today for chemotherapy.    Patient seen by provider today: No   present during visit today: Not Applicable.    Note: N/A.    Intravenous Access:  Labs drawn without difficulty.  Implanted Port.    Treatment Conditions:  Lab Results   Component Value Date    HGB 12.7 11/29/2017     Lab Results   Component Value Date    WBC 5.5 11/29/2017      Lab Results   Component Value Date    ANEU 5.0 11/29/2017     Lab Results   Component Value Date     11/29/2017      Results reviewed, labs MET treatment parameters, ok to proceed with treatment.        Post Infusion Assessment:  Patient tolerated injection without incident.  Site patent and intact, free from redness, edema or discomfort.  No evidence of extravasations.  Access discontinued per protocol.    Discharge Plan:   Discharge instructions reviewed with: Patient.  Patient and/or family verbalized understanding of discharge instructions and all questions answered.  Copy of AVS reviewed with patient and/or family.  Patient will return 12/6/2017 for next appointment.  Patient discharged in stable condition accompanied by: self.  Departure Mode: Ambulatory.    Thelma Acosta RN

## 2017-12-06 ENCOUNTER — INFUSION THERAPY VISIT (OUTPATIENT)
Dept: INFUSION THERAPY | Facility: CLINIC | Age: 82
End: 2017-12-06
Attending: INTERNAL MEDICINE
Payer: MEDICARE

## 2017-12-06 ENCOUNTER — ANTICOAGULATION THERAPY VISIT (OUTPATIENT)
Dept: FAMILY MEDICINE | Facility: CLINIC | Age: 82
End: 2017-12-06
Payer: COMMERCIAL

## 2017-12-06 ENCOUNTER — ONCOLOGY VISIT (OUTPATIENT)
Dept: ONCOLOGY | Facility: CLINIC | Age: 82
End: 2017-12-06
Attending: INTERNAL MEDICINE
Payer: MEDICARE

## 2017-12-06 ENCOUNTER — HOSPITAL ENCOUNTER (OUTPATIENT)
Facility: CLINIC | Age: 82
Setting detail: SPECIMEN
Discharge: HOME | End: 2017-12-06
Attending: INTERNAL MEDICINE | Admitting: INTERNAL MEDICINE
Payer: MEDICARE

## 2017-12-06 VITALS
SYSTOLIC BLOOD PRESSURE: 160 MMHG | HEART RATE: 79 BPM | HEIGHT: 66 IN | WEIGHT: 137.6 LBS | RESPIRATION RATE: 20 BRPM | OXYGEN SATURATION: 98 % | TEMPERATURE: 98.2 F | DIASTOLIC BLOOD PRESSURE: 79 MMHG | BODY MASS INDEX: 22.11 KG/M2

## 2017-12-06 VITALS
HEIGHT: 66 IN | OXYGEN SATURATION: 98 % | HEART RATE: 79 BPM | SYSTOLIC BLOOD PRESSURE: 160 MMHG | RESPIRATION RATE: 20 BRPM | DIASTOLIC BLOOD PRESSURE: 79 MMHG | TEMPERATURE: 98.2 F | BODY MASS INDEX: 22.11 KG/M2 | WEIGHT: 137.6 LBS

## 2017-12-06 DIAGNOSIS — C90.00 MULTIPLE MYELOMA NOT HAVING ACHIEVED REMISSION (H): Primary | ICD-10-CM

## 2017-12-06 DIAGNOSIS — I48.0 PAROXYSMAL ATRIAL FIBRILLATION (H): ICD-10-CM

## 2017-12-06 DIAGNOSIS — Z79.01 LONG-TERM (CURRENT) USE OF ANTICOAGULANTS: ICD-10-CM

## 2017-12-06 LAB
BASOPHILS # BLD AUTO: 0 10E9/L (ref 0–0.2)
BASOPHILS NFR BLD AUTO: 0.2 %
DIFFERENTIAL METHOD BLD: ABNORMAL
EOSINOPHIL # BLD AUTO: 0.1 10E9/L (ref 0–0.7)
EOSINOPHIL NFR BLD AUTO: 1 %
ERYTHROCYTE [DISTWIDTH] IN BLOOD BY AUTOMATED COUNT: 13.8 % (ref 10–15)
HCT VFR BLD AUTO: 37.2 % (ref 35–47)
HGB BLD-MCNC: 12.8 G/DL (ref 11.7–15.7)
IMM GRANULOCYTES # BLD: 0 10E9/L (ref 0–0.4)
IMM GRANULOCYTES NFR BLD: 0.2 %
INR PPP: 2.04
INR PPP: 2.04 (ref 0.86–1.14)
LYMPHOCYTES # BLD AUTO: 1 10E9/L (ref 0.8–5.3)
LYMPHOCYTES NFR BLD AUTO: 16.3 %
MCH RBC QN AUTO: 34.9 PG (ref 26.5–33)
MCHC RBC AUTO-ENTMCNC: 34.4 G/DL (ref 31.5–36.5)
MCV RBC AUTO: 101 FL (ref 78–100)
MONOCYTES # BLD AUTO: 0.9 10E9/L (ref 0–1.3)
MONOCYTES NFR BLD AUTO: 14.4 %
NEUTROPHILS # BLD AUTO: 4.2 10E9/L (ref 1.6–8.3)
NEUTROPHILS NFR BLD AUTO: 67.9 %
NRBC # BLD AUTO: 0 10*3/UL
NRBC BLD AUTO-RTO: 0 /100
PLATELET # BLD AUTO: 132 10E9/L (ref 150–450)
RBC # BLD AUTO: 3.67 10E12/L (ref 3.8–5.2)
WBC # BLD AUTO: 6.2 10E9/L (ref 4–11)

## 2017-12-06 PROCEDURE — 96401 CHEMO ANTI-NEOPL SQ/IM: CPT

## 2017-12-06 PROCEDURE — 25000128 H RX IP 250 OP 636: Mod: JW | Performed by: INTERNAL MEDICINE

## 2017-12-06 PROCEDURE — 99214 OFFICE O/P EST MOD 30 MIN: CPT | Performed by: INTERNAL MEDICINE

## 2017-12-06 PROCEDURE — 99211 OFF/OP EST MAY X REQ PHY/QHP: CPT

## 2017-12-06 PROCEDURE — 99207 ZZC NO CHARGE NURSE ONLY: CPT | Performed by: INTERNAL MEDICINE

## 2017-12-06 PROCEDURE — 85610 PROTHROMBIN TIME: CPT | Performed by: INTERNAL MEDICINE

## 2017-12-06 PROCEDURE — 85025 COMPLETE CBC W/AUTO DIFF WBC: CPT | Performed by: INTERNAL MEDICINE

## 2017-12-06 RX ORDER — OXYCODONE HYDROCHLORIDE 15 MG/1
15 TABLET ORAL EVERY 8 HOURS PRN
Qty: 60 TABLET | Refills: 0 | Status: SHIPPED | OUTPATIENT
Start: 2017-12-06 | End: 2018-01-03

## 2017-12-06 RX ADMIN — BORTEZOMIB 2.2 MG: 3.5 INJECTION, POWDER, LYOPHILIZED, FOR SOLUTION INTRAVENOUS; SUBCUTANEOUS at 14:26

## 2017-12-06 ASSESSMENT — PAIN SCALES - GENERAL: PAINLEVEL: NO PAIN (0)

## 2017-12-06 NOTE — PROGRESS NOTES
Infusion Nursing Note:  Amira IVY Arreola presents today for Cycle 7 Day 22 Velcade.    Patient seen by provider today: Yes: Dr. Roberts   present during visit today: Not Applicable.    Note: N/A.    Intravenous Access:  Implanted Port.    Treatment Conditions:  Lab Results   Component Value Date    HGB 12.8 12/06/2017     Lab Results   Component Value Date    WBC 6.2 12/06/2017      Lab Results   Component Value Date    ANEU 4.2 12/06/2017     Lab Results   Component Value Date     12/06/2017      Results reviewed, labs MET treatment parameters, ok to proceed with treatment.        Post Infusion Assessment:  Patient tolerated injection without incident.  Blood return noted pre and post infusion.  Site patent and intact, free from redness, edema or discomfort.  No evidence of extravasations.  Access discontinued per protocol.    Discharge Plan:   Discharge instructions reviewed with: Patient.  Patient verbalized understanding of discharge instructions and all questions answered.  Copy of AVS reviewed with patient.  Patient will return 12/13/17 for next appointment.  Patient discharged in stable condition accompanied by: self.  Departure Mode: Ambulatory.    Senia Juan RN

## 2017-12-06 NOTE — PATIENT INSTRUCTIONS
Continue chemotherapy. Scheduled/dorita  Follow up in 4-5 weeks.  Scheduled/Dorita      AVS printed & mailed to patient, discusssed future appts on telephone/dorita

## 2017-12-06 NOTE — PROGRESS NOTES
"Oncology Rooming Note    December 6, 2017 1:49 PM   Amira Arreola is a 85 year old female who presents for:    Chief Complaint   Patient presents with     Oncology Clinic Visit     Multiple myeloma not having achieved remission (H)     Initial Vitals: /79 (BP Location: Right arm)  Pulse 79  Temp 98.2  F (36.8  C) (Oral)  Resp 20  Ht 1.676 m (5' 5.98\")  Wt 62.4 kg (137 lb 9.6 oz)  SpO2 98%  BMI 22.22 kg/m2 Estimated body mass index is 22.22 kg/(m^2) as calculated from the following:    Height as of this encounter: 1.676 m (5' 5.98\").    Weight as of this encounter: 62.4 kg (137 lb 9.6 oz). Body surface area is 1.7 meters squared.  Data Unavailable Comment: Data Unavailable   No LMP recorded. Patient is postmenopausal.  Allergies reviewed: Yes  Medications reviewed: Yes    Medications: Pt needs refill on LORazepam 0.5 mg tablet and oxcycodone  Pharmacy name entered into [employer]: [facility] [address] [place] [address] 7 AT [facility] [address]    Clinical concerns: None                  4 minutes for nursing intake (face to face time)     Zora Bentley MA          DISCHARGE PLAN:  Next appointments: See patient instruction section. Brought patient to [facility]. Given pt instructions to the schedulers  To schedule the next appts  Departure Mode: Ambulatory  Accompanied by: self  5 minutes for nursing discharge (face to face time)   Nicki Amaro CMA    "

## 2017-12-06 NOTE — Clinical Note
"    12/6/2017         RE: Amira Arreola  [address]  [place] [address]        Dear Colleague,    Thank you for referring your patient, Amira Arreola, to the [facility]. Please see a copy of my visit note below.    Oncology Rooming Note    December 6, 2017 1:49 PM   Amira Arreola is a 85 year old female who presents for:    Chief Complaint   Patient presents with     Oncology Clinic Visit     Multiple myeloma not having achieved remission (H)     Initial Vitals: /79 (BP Location: Right arm)  Pulse 79  Temp 98.2  F (36.8  C) (Oral)  Resp 20  Ht 1.676 m (5' 5.98\")  Wt 62.4 kg (137 lb 9.6 oz)  SpO2 98%  BMI 22.22 kg/m2 Estimated body mass index is 22.22 kg/(m^2) as calculated from the following:    Height as of this encounter: 1.676 m (5' 5.98\").    Weight as of this encounter: 62.4 kg (137 lb 9.6 oz). Body surface area is 1.7 meters squared.  Data Unavailable Comment: Data Unavailable   No LMP recorded. Patient is postmenopausal.  Allergies reviewed: Yes  Medications reviewed: Yes    Medications: Pt needs refill on LORazepam 0.5 mg tablet and oxcycodone  Pharmacy name entered into [employer]: [id number], [place] - [address] AT [facility] [address]    Clinical concerns: None                  4 minutes for nursing intake (face to face time)     Zora Bentley MA          DISCHARGE PLAN:  Next appointments: See patient instruction section. Brought patient to [facility]. 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     1.  She had work-up for thrombocytopenia.       - On 09/21/2015, WBC of 4.2, hemoglobin of 13.2 and platelets of 138. CMP normal except mildly low protein of " 6.4.   -On 09/29/2015, SPEP does not reveal any M-spike.   - On 10/02/2015, JANET does not reveal any monoclonal protein.     - On 10/22/2015, urine immunofixation reveals monoclonal free kappa light chain.    2. On 05/11/2016, kappa light chain of 50, lambda light chain of 0.32 and ratio of kappa to lambda of 156.2.  3. Skeletal survey on 05/23/2016 does not reveal any lytic lesion.    4. Bone marrow biopsy on 05/25/2016 reveals 40-50% kappa light chain restricted plasma cells.  Cytogenetics is normal. 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    5. MRI of bones on 06/21/2016 and 06/22/2016 reveals myeloma lesions.  6. On 08/24/2016, she was started on revlimid 25 mg 3 weeks on and 1 week off along with dexamethasone 20 mg weekly. Due to cytopenia, dose was subsequently reduced to 15 mg a day. Treatment in between had to be delayed because of cytopenia. She did not have any significant response to treatment.   7. Velcade and dexamethasone started on 03/21/2017.    8. On 03/21/2017, kappa free light chain was 52.5.  It decreased to 41.75 on 04/18/2017.  It  increased to 60.75 on 05/16/2017.    9. Daratumumab added on 05/31/2017.   10.  On 06/28/2017, kappa free light chain is down to 6.43.  11.  On 07/26/2017, kappa free light chain is 13.10.  12.  On 08/23/2017, kappa free light chain is 8.29.  13.  On 09/20/2017, kappa free light chain of 4.17.   14.  On 10/18/2017, kappa free light chain of 2.93.     SUBJECTIVE:    Ms. Amira Arreola is an 85-year-old female with kappa free light chain multiple myeloma.  She is currently on Velcade, dexamethasone and daratumumab.  Her disease has been responding.  [place] free light chain has decreased.      Overall, she is doing well.  She is tolerating treatment well.  She has fatigue which would go along with her age.  Denies any headache.  No dizziness.  She has not  been falling down.  No neck pain.  No chest pain.  No shortness of breath.  No abdominal pain, nausea or vomiting.  No urinary or bowel complaints.  She has chronic back pain.  It is mainly in the lower back. Sometimes in the upper back.  She takes oxycodone which helps.      Patient's  recently had surgery and is in a TCU right now.  Because of that, she is stressed.      PHYSICAL EXAMINATION:   Alert and oriented x 3.   EYES:  No icterus.   THROAT:  No ulcer or thrush.   NECK:  Supple. No lymphadenopathy.   AXILLAE:  No lymphadenopathy.   LUNGS:  Good air entry bilaterally.  No crackles or wheezing.   HEART:  Regular.  No murmur.   ABDOMEN:  Soft and nontender.  No mass.   EXTREMITIES: Bilateral pedal edema. No calf swelling or tenderness.   SKIN:  No rash.       LABORATORY DATA:  Reviewed.      ASSESSMENT:   1.  An 85-year-old female with kappa free light chain multiple myeloma.   2.  Fatigue secondary to her age, myeloma and chemotherapy.   3.  Mild thrombocytopenia.   4.  Chronic back pain.      PLAN:   1.  I discussed with her regarding multiple myeloma.  It is a stable.  Labs on 11/15/2017 revealed kappa free light chain of 3.52, lambda free light chain of 0.63 and ratio of kappa to lambda of 5.59.  Ration has decreased. M-spike is stable at 0.1. Explained to her that her myeloma is stable.  Patient will continue on Velcade, daratumumab and dexamethasone.  Side effects reviewed.   2.  For bone health, she will continue on Zometa every 3 months.   3.  Patient has mild thrombocytopenia.  This is secondary to myeloma and chemotherapy.  Her CBC will be monitored.  No dose reduction needed.     4.  For back pain, prescription of oxycodone refilled. Pain is controlled.    5.  She had a few questions, which were all answered.  I will see her in a month for followup.  Advised her to see a physician sooner if she has worsening bone pain, worsening weakness, infection, or any other concerns.         ITZ  MD JESSICA             D: 2017 14:31   T: 2017 22:37   MT: CD      Name:     ALBERTO PARSON   MRN:      [id number]        Account:      [id number]   :      1932           Visit Date:   2017      Document: [id number]        Again, thank you for allowing me to participate in the care of your patient.        Sincerely,        Shayne Roberts MD

## 2017-12-06 NOTE — PROGRESS NOTES
ANTICOAGULATION FOLLOW-UP CLINIC VISIT    Patient Name:  Amira Arreola  Date:  12/6/2017  Contact Type:  Telephone/ Dose instructions left on patient's home VM    SUBJECTIVE:     Patient Findings     Positives No Problem Findings           OBJECTIVE    INR   Date Value Ref Range Status   12/06/2017 2.04 (H) 0.86 - 1.14 Final       ASSESSMENT / PLAN  INR assessment THER    Recheck INR In: 1 WEEK    INR Location Outside lab      Anticoagulation Summary as of 12/6/2017     INR goal 2.0-3.0   Today's INR 2.04   Maintenance plan 6 mg (4 mg x 1.5) on Mon, Wed, Fri; 4 mg (4 mg x 1) all other days   Full instructions 6 mg on Mon, Wed, Fri; 4 mg all other days   Weekly total 34 mg   No change documented Cintia Powers RN   Plan last modified Cintia Powers RN (11/29/2017)   Next INR check 12/13/2017   Target end date Indefinite    Indications   Long-term (current) use of anticoagulants [Z79.01] [Z79.01]  Atrial fibrillation (H) [I48.91] (Resolved) [I48.91]         Anticoagulation Episode Summary     INR check location     Preferred lab     Send INR reminders to [facility]    Comments patient have standing INR order to be draw at infusion visit.  advise to call [facility] when have INR draw for dosing instruction.  patient can be reach at home phone [phone]      Anticoagulation Care Providers     Provider Role Specialty Phone number    Addy Frias MD [facility] [phone]            See the Encounter Report to view Anticoagulation Flowsheet and Dosing Calendar (Go to Encounters tab in chart review, and find the Anticoagulation Therapy Visit)    Dosage adjustment made based on physician directed care plan.    Cintia Powers RN

## 2017-12-06 NOTE — MR AVS SNAPSHOT
Amira IVY Arreola   12/6/2017   Anticoagulation Therapy Visit    Description:  85 year old female   Provider:  Addy Frias MD   Department:  [facility]           INR as of 12/6/2017     Today's INR 2.04      Anticoagulation Summary as of 12/6/2017     INR goal 2.0-3.0   Today's INR 2.04   Full instructions 6 mg on Mon, Wed, Fri; 4 mg all other days   Next INR check 12/13/2017    Indications   Long-term (current) use of anticoagulants [Z79.01] [Z79.01]  Atrial fibrillation (H) [I48.91] (Resolved) [I48.91]         December 2017 Details    Sun Mon Tue Wed Thu Fri Sat          1               2                 3               4               5               6      6 mg   See details      7      4 mg         8      6 mg         9      4 mg           10      4 mg         11      6 mg         12      4 mg         13            14               15               16                 17               18               19               20               21               22               23                 24               25               26               27               28               29               30                 31                      Date Details   12/06 This INR check       Date of next INR:  12/13/2017         How to take your warfarin dose     To take:  4 mg Take 1 of the 4 mg tablets.    To take:  6 mg Take 1.5 of the 4 mg tablets.

## 2017-12-07 NOTE — PROGRESS NOTES
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     1.  She had work-up for thrombocytopenia.       - On 09/21/2015, WBC of 4.2, hemoglobin of 13.2 and platelets of 138. CMP normal except mildly low protein of 6.4.   -On 09/29/2015, SPEP does not reveal any M-spike.   - On 10/02/2015, JANET does not reveal any monoclonal protein.     - On 10/22/2015, urine immunofixation reveals monoclonal free kappa light chain.    2. On 05/11/2016, kappa light chain of 50, lambda light chain of 0.32 and ratio of kappa to lambda of 156.2.  3. Skeletal survey on 05/23/2016 does not reveal any lytic lesion.    4. Bone marrow biopsy on 05/25/2016 reveals 40-50% kappa light chain restricted plasma cells.  Cytogenetics is normal. 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    5. MRI of bones on 06/21/2016 and 06/22/2016 reveals myeloma lesions.  6. On 08/24/2016, she was started on revlimid 25 mg 3 weeks on and 1 week off along with dexamethasone 20 mg weekly. Due to cytopenia, dose was subsequently reduced to 15 mg a day. Treatment in between had to be delayed because of cytopenia. She did not have any significant response to treatment.   7. Velcade and dexamethasone started on 03/21/2017.    8. On 03/21/2017, kappa free light chain was 52.5.  It decreased to 41.75 on 04/18/2017.  It  increased to 60.75 on 05/16/2017.    9. Daratumumab added on 05/31/2017.   10.  On 06/28/2017, kappa free light chain is down to 6.43.  11.  On 07/26/2017, kappa free light chain is 13.10.  12.  On 08/23/2017, kappa free light chain is 8.29.  13.  On 09/20/2017, kappa free light chain of 4.17.   14.  On 10/18/2017, kappa free light chain of 2.93.     SUBJECTIVE:    Ms. Amira Arreola is an 85-year-old female with kappa free light chain multiple myeloma.  She is currently on Velcade,  dexamethasone and daratumumab.  Her disease has been responding.  [place] free light chain has decreased.      Overall, she is doing well.  She is tolerating treatment well.  She has fatigue which would go along with her age.  Denies any headache.  No dizziness.  She has not been falling down.  No neck pain.  No chest pain.  No shortness of breath.  No abdominal pain, nausea or vomiting.  No urinary or bowel complaints.  She has chronic back pain.  It is mainly in the lower back. Sometimes in the upper back.  She takes oxycodone which helps.      Patient's  recently had surgery and is in a TCU right now.  Because of that, she is stressed.      PHYSICAL EXAMINATION:   Alert and oriented x 3.   EYES:  No icterus.   THROAT:  No ulcer or thrush.   NECK:  Supple. No lymphadenopathy.   AXILLAE:  No lymphadenopathy.   LUNGS:  Good air entry bilaterally.  No crackles or wheezing.   HEART:  Regular.  No murmur.   ABDOMEN:  Soft and nontender.  No mass.   EXTREMITIES: Bilateral pedal edema. No calf swelling or tenderness.   SKIN:  No rash.       LABORATORY DATA:  Reviewed.      ASSESSMENT:   1.  An 85-year-old female with kappa free light chain multiple myeloma.   2.  Fatigue secondary to her age, myeloma and chemotherapy.   3.  Mild thrombocytopenia.   4.  Chronic back pain.      PLAN:   1.  I discussed with her regarding multiple myeloma.  It is a stable.  Labs on 11/15/2017 revealed kappa free light chain of 3.52, lambda free light chain of 0.63 and ratio of kappa to lambda of 5.59.  Ration has decreased. M-spike is stable at 0.1. Explained to her that her myeloma is stable.  Patient will continue on Velcade, daratumumab and dexamethasone.  Side effects reviewed.   2.  For bone health, she will continue on Zometa every 3 months.   3.  Patient has mild thrombocytopenia.  This is secondary to myeloma and chemotherapy.  Her CBC will be monitored.  No dose reduction needed.     4.  For back pain, prescription of oxycodone  refilled. Pain is controlled.    5.  She had a few questions, which were all answered.  I will see her in a month for followup.  Advised her to see a physician sooner if she has worsening bone pain, worsening weakness, infection, or any other concerns.         ITZ LOPEZ MD             D: 2017 14:31   T: 2017 22:37   MT: FREDDIE      Name:     ALBERTO PARSON   MRN:      [id number]        Account:      [id number]   :      1932           Visit Date:   2017      Document: [id number]

## 2017-12-13 ENCOUNTER — ANTICOAGULATION THERAPY VISIT (OUTPATIENT)
Dept: FAMILY MEDICINE | Facility: CLINIC | Age: 82
End: 2017-12-13
Payer: COMMERCIAL

## 2017-12-13 ENCOUNTER — HOSPITAL ENCOUNTER (OUTPATIENT)
Facility: CLINIC | Age: 82
Setting detail: SPECIMEN
Discharge: HOME | End: 2017-12-13
Attending: INTERNAL MEDICINE | Admitting: INTERNAL MEDICINE
Payer: MEDICARE

## 2017-12-13 ENCOUNTER — INFUSION THERAPY VISIT (OUTPATIENT)
Dept: INFUSION THERAPY | Facility: CLINIC | Age: 82
End: 2017-12-13
Attending: INTERNAL MEDICINE
Payer: MEDICARE

## 2017-12-13 VITALS
OXYGEN SATURATION: 99 % | WEIGHT: 138.4 LBS | RESPIRATION RATE: 18 BRPM | BODY MASS INDEX: 22.24 KG/M2 | DIASTOLIC BLOOD PRESSURE: 89 MMHG | TEMPERATURE: 97.8 F | HEIGHT: 66 IN | HEART RATE: 79 BPM | SYSTOLIC BLOOD PRESSURE: 151 MMHG

## 2017-12-13 DIAGNOSIS — C90.00 MULTIPLE MYELOMA NOT HAVING ACHIEVED REMISSION (H): Primary | ICD-10-CM

## 2017-12-13 DIAGNOSIS — I48.0 PAROXYSMAL ATRIAL FIBRILLATION (H): ICD-10-CM

## 2017-12-13 DIAGNOSIS — Z79.01 LONG-TERM (CURRENT) USE OF ANTICOAGULANTS: ICD-10-CM

## 2017-12-13 LAB
ALBUMIN SERPL-MCNC: 3.5 G/DL (ref 3.4–5)
ALP SERPL-CCNC: 48 U/L (ref 40–150)
ALT SERPL W P-5'-P-CCNC: 29 U/L (ref 0–50)
AST SERPL W P-5'-P-CCNC: 28 U/L (ref 0–45)
BASOPHILS # BLD AUTO: 0 10E9/L (ref 0–0.2)
BASOPHILS NFR BLD AUTO: 0 %
BILIRUB DIRECT SERPL-MCNC: 0.2 MG/DL (ref 0–0.2)
BILIRUB SERPL-MCNC: 0.5 MG/DL (ref 0.2–1.3)
DIFFERENTIAL METHOD BLD: ABNORMAL
EOSINOPHIL # BLD AUTO: 0 10E9/L (ref 0–0.7)
EOSINOPHIL NFR BLD AUTO: 0 %
ERYTHROCYTE [DISTWIDTH] IN BLOOD BY AUTOMATED COUNT: 13.7 % (ref 10–15)
HCT VFR BLD AUTO: 38.2 % (ref 35–47)
HGB BLD-MCNC: 12.7 G/DL (ref 11.7–15.7)
IMM GRANULOCYTES # BLD: 0 10E9/L (ref 0–0.4)
IMM GRANULOCYTES NFR BLD: 0.2 %
INR PPP: 2.2 (ref 0.86–1.14)
LYMPHOCYTES # BLD AUTO: 0.4 10E9/L (ref 0.8–5.3)
LYMPHOCYTES NFR BLD AUTO: 6.2 %
MCH RBC QN AUTO: 33.9 PG (ref 26.5–33)
MCHC RBC AUTO-ENTMCNC: 33.2 G/DL (ref 31.5–36.5)
MCV RBC AUTO: 102 FL (ref 78–100)
MONOCYTES # BLD AUTO: 0.1 10E9/L (ref 0–1.3)
MONOCYTES NFR BLD AUTO: 2.2 %
NEUTROPHILS # BLD AUTO: 5.9 10E9/L (ref 1.6–8.3)
NEUTROPHILS NFR BLD AUTO: 91.4 %
PLATELET # BLD AUTO: 141 10E9/L (ref 150–450)
PROT SERPL-MCNC: 6.2 G/DL (ref 6.8–8.8)
RBC # BLD AUTO: 3.75 10E12/L (ref 3.8–5.2)
WBC # BLD AUTO: 6.5 10E9/L (ref 4–11)

## 2017-12-13 PROCEDURE — 25000128 H RX IP 250 OP 636: Performed by: INTERNAL MEDICINE

## 2017-12-13 PROCEDURE — 99207 ZZC NO CHARGE NURSE ONLY: CPT | Performed by: INTERNAL MEDICINE

## 2017-12-13 PROCEDURE — 83883 ASSAY NEPHELOMETRY NOT SPEC: CPT | Performed by: INTERNAL MEDICINE

## 2017-12-13 PROCEDURE — 84165 PROTEIN E-PHORESIS SERUM: CPT | Performed by: INTERNAL MEDICINE

## 2017-12-13 PROCEDURE — 85025 COMPLETE CBC W/AUTO DIFF WBC: CPT | Performed by: INTERNAL MEDICINE

## 2017-12-13 PROCEDURE — 85610 PROTHROMBIN TIME: CPT | Performed by: INTERNAL MEDICINE

## 2017-12-13 PROCEDURE — 80076 HEPATIC FUNCTION PANEL: CPT | Performed by: INTERNAL MEDICINE

## 2017-12-13 PROCEDURE — 25000132 ZZH RX MED GY IP 250 OP 250 PS 637: Mod: GY | Performed by: INTERNAL MEDICINE

## 2017-12-13 PROCEDURE — A9270 NON-COVERED ITEM OR SERVICE: HCPCS | Mod: GY | Performed by: INTERNAL MEDICINE

## 2017-12-13 PROCEDURE — 96415 CHEMO IV INFUSION ADDL HR: CPT

## 2017-12-13 PROCEDURE — 96413 CHEMO IV INFUSION 1 HR: CPT

## 2017-12-13 PROCEDURE — 96375 TX/PRO/DX INJ NEW DRUG ADDON: CPT

## 2017-12-13 PROCEDURE — 00000402 ZZHCL STATISTIC TOTAL PROTEIN: Performed by: INTERNAL MEDICINE

## 2017-12-13 RX ORDER — EPINEPHRINE 0.3 MG/.3ML
0.3 INJECTION SUBCUTANEOUS EVERY 5 MIN PRN
Status: CANCELLED | OUTPATIENT
Start: 2017-12-20

## 2017-12-13 RX ORDER — LORAZEPAM 2 MG/ML
0.5 INJECTION INTRAMUSCULAR EVERY 4 HOURS PRN
Status: CANCELLED
Start: 2017-12-13

## 2017-12-13 RX ORDER — ALBUTEROL SULFATE 90 UG/1
1-2 AEROSOL, METERED RESPIRATORY (INHALATION)
Status: CANCELLED
Start: 2018-01-03

## 2017-12-13 RX ORDER — ALBUTEROL SULFATE 0.83 MG/ML
2.5 SOLUTION RESPIRATORY (INHALATION)
Status: CANCELLED | OUTPATIENT
Start: 2017-12-13

## 2017-12-13 RX ORDER — LORAZEPAM 2 MG/ML
0.5 INJECTION INTRAMUSCULAR EVERY 4 HOURS PRN
Status: CANCELLED
Start: 2018-01-03

## 2017-12-13 RX ORDER — DIPHENHYDRAMINE HYDROCHLORIDE 50 MG/ML
50 INJECTION INTRAMUSCULAR; INTRAVENOUS
Status: CANCELLED
Start: 2017-12-20

## 2017-12-13 RX ORDER — HEPARIN SODIUM (PORCINE) LOCK FLUSH IV SOLN 100 UNIT/ML 100 UNIT/ML
5 SOLUTION INTRAVENOUS EVERY 8 HOURS
Status: CANCELLED
Start: 2018-01-03

## 2017-12-13 RX ORDER — ALBUTEROL SULFATE 90 UG/1
1-2 AEROSOL, METERED RESPIRATORY (INHALATION)
Status: CANCELLED
Start: 2017-12-20

## 2017-12-13 RX ORDER — SODIUM CHLORIDE 9 MG/ML
1000 INJECTION, SOLUTION INTRAVENOUS CONTINUOUS PRN
Status: CANCELLED
Start: 2017-12-20

## 2017-12-13 RX ORDER — DIPHENHYDRAMINE HYDROCHLORIDE 50 MG/ML
50 INJECTION INTRAMUSCULAR; INTRAVENOUS
Status: CANCELLED
Start: 2018-01-10

## 2017-12-13 RX ORDER — ACETAMINOPHEN 325 MG/1
650 TABLET ORAL ONCE
Status: COMPLETED | OUTPATIENT
Start: 2017-12-13 | End: 2017-12-13

## 2017-12-13 RX ORDER — METHYLPREDNISOLONE SODIUM SUCCINATE 125 MG/2ML
125 INJECTION, POWDER, LYOPHILIZED, FOR SOLUTION INTRAMUSCULAR; INTRAVENOUS
Status: CANCELLED
Start: 2018-01-10

## 2017-12-13 RX ORDER — EPINEPHRINE 1 MG/ML
0.3 INJECTION, SOLUTION INTRAMUSCULAR; SUBCUTANEOUS EVERY 5 MIN PRN
Status: CANCELLED | OUTPATIENT
Start: 2017-12-20

## 2017-12-13 RX ORDER — MEPERIDINE HYDROCHLORIDE 50 MG/ML
25 INJECTION INTRAMUSCULAR; INTRAVENOUS; SUBCUTANEOUS EVERY 30 MIN PRN
Status: CANCELLED | OUTPATIENT
Start: 2017-12-20

## 2017-12-13 RX ORDER — ALBUTEROL SULFATE 0.83 MG/ML
2.5 SOLUTION RESPIRATORY (INHALATION)
Status: CANCELLED | OUTPATIENT
Start: 2018-01-03

## 2017-12-13 RX ORDER — METHYLPREDNISOLONE SODIUM SUCCINATE 125 MG/2ML
125 INJECTION, POWDER, LYOPHILIZED, FOR SOLUTION INTRAMUSCULAR; INTRAVENOUS
Status: CANCELLED
Start: 2017-12-20

## 2017-12-13 RX ORDER — HEPARIN SODIUM (PORCINE) LOCK FLUSH IV SOLN 100 UNIT/ML 100 UNIT/ML
5 SOLUTION INTRAVENOUS EVERY 8 HOURS
Status: CANCELLED
Start: 2017-12-13

## 2017-12-13 RX ORDER — DIPHENHYDRAMINE HYDROCHLORIDE 50 MG/ML
50 INJECTION INTRAMUSCULAR; INTRAVENOUS
Status: CANCELLED
Start: 2017-12-13

## 2017-12-13 RX ORDER — SODIUM CHLORIDE 9 MG/ML
1000 INJECTION, SOLUTION INTRAVENOUS CONTINUOUS PRN
Status: CANCELLED
Start: 2018-01-03

## 2017-12-13 RX ORDER — METHYLPREDNISOLONE SODIUM SUCCINATE 125 MG/2ML
125 INJECTION, POWDER, LYOPHILIZED, FOR SOLUTION INTRAMUSCULAR; INTRAVENOUS
Status: CANCELLED
Start: 2017-12-13

## 2017-12-13 RX ORDER — EPINEPHRINE 0.3 MG/.3ML
0.3 INJECTION SUBCUTANEOUS EVERY 5 MIN PRN
Status: CANCELLED | OUTPATIENT
Start: 2018-01-03

## 2017-12-13 RX ORDER — ALBUTEROL SULFATE 0.83 MG/ML
2.5 SOLUTION RESPIRATORY (INHALATION)
Status: CANCELLED | OUTPATIENT
Start: 2017-12-20

## 2017-12-13 RX ORDER — SODIUM CHLORIDE 9 MG/ML
1000 INJECTION, SOLUTION INTRAVENOUS CONTINUOUS PRN
Status: CANCELLED
Start: 2018-01-10

## 2017-12-13 RX ORDER — DEXAMETHASONE 4 MG/1
TABLET ORAL
Qty: 28 TABLET | Refills: 0 | Status: SHIPPED | OUTPATIENT
Start: 2017-12-13 | End: 2017-12-20

## 2017-12-13 RX ORDER — EPINEPHRINE 1 MG/ML
0.3 INJECTION, SOLUTION INTRAMUSCULAR; SUBCUTANEOUS EVERY 5 MIN PRN
Status: CANCELLED | OUTPATIENT
Start: 2017-12-13

## 2017-12-13 RX ORDER — ALBUTEROL SULFATE 0.83 MG/ML
2.5 SOLUTION RESPIRATORY (INHALATION)
Status: CANCELLED | OUTPATIENT
Start: 2018-01-10

## 2017-12-13 RX ORDER — MEPERIDINE HYDROCHLORIDE 50 MG/ML
25 INJECTION INTRAMUSCULAR; INTRAVENOUS; SUBCUTANEOUS EVERY 30 MIN PRN
Status: CANCELLED | OUTPATIENT
Start: 2018-01-03

## 2017-12-13 RX ORDER — EPINEPHRINE 1 MG/ML
0.3 INJECTION, SOLUTION INTRAMUSCULAR; SUBCUTANEOUS EVERY 5 MIN PRN
Status: CANCELLED | OUTPATIENT
Start: 2018-01-10

## 2017-12-13 RX ORDER — LORAZEPAM 2 MG/ML
0.5 INJECTION INTRAMUSCULAR EVERY 4 HOURS PRN
Status: CANCELLED
Start: 2018-01-10

## 2017-12-13 RX ORDER — DIPHENHYDRAMINE HCL 25 MG
50 CAPSULE ORAL ONCE
Status: COMPLETED | OUTPATIENT
Start: 2017-12-13 | End: 2017-12-13

## 2017-12-13 RX ORDER — METHYLPREDNISOLONE SODIUM SUCCINATE 125 MG/2ML
125 INJECTION, POWDER, LYOPHILIZED, FOR SOLUTION INTRAMUSCULAR; INTRAVENOUS
Status: CANCELLED
Start: 2018-01-03

## 2017-12-13 RX ORDER — MEPERIDINE HYDROCHLORIDE 50 MG/ML
25 INJECTION INTRAMUSCULAR; INTRAVENOUS; SUBCUTANEOUS EVERY 30 MIN PRN
Status: CANCELLED | OUTPATIENT
Start: 2018-01-10

## 2017-12-13 RX ORDER — ALBUTEROL SULFATE 90 UG/1
1-2 AEROSOL, METERED RESPIRATORY (INHALATION)
Status: CANCELLED
Start: 2017-12-13

## 2017-12-13 RX ORDER — DIPHENHYDRAMINE HCL 25 MG
50 CAPSULE ORAL ONCE
Status: CANCELLED | OUTPATIENT
Start: 2017-12-13

## 2017-12-13 RX ORDER — ACETAMINOPHEN 325 MG/1
650 TABLET ORAL ONCE
Status: CANCELLED | OUTPATIENT
Start: 2017-12-13

## 2017-12-13 RX ORDER — SODIUM CHLORIDE 9 MG/ML
1000 INJECTION, SOLUTION INTRAVENOUS CONTINUOUS PRN
Status: CANCELLED
Start: 2017-12-13

## 2017-12-13 RX ORDER — HEPARIN SODIUM (PORCINE) LOCK FLUSH IV SOLN 100 UNIT/ML 100 UNIT/ML
5 SOLUTION INTRAVENOUS EVERY 8 HOURS
Status: CANCELLED
Start: 2017-12-20

## 2017-12-13 RX ORDER — HEPARIN SODIUM (PORCINE) LOCK FLUSH IV SOLN 100 UNIT/ML 100 UNIT/ML
5 SOLUTION INTRAVENOUS EVERY 8 HOURS
Status: DISCONTINUED | OUTPATIENT
Start: 2017-12-13 | End: 2017-12-13 | Stop reason: HOSPADM

## 2017-12-13 RX ORDER — ALBUTEROL SULFATE 90 UG/1
1-2 AEROSOL, METERED RESPIRATORY (INHALATION)
Status: CANCELLED
Start: 2018-01-10

## 2017-12-13 RX ORDER — MEPERIDINE HYDROCHLORIDE 50 MG/ML
25 INJECTION INTRAMUSCULAR; INTRAVENOUS; SUBCUTANEOUS EVERY 30 MIN PRN
Status: CANCELLED | OUTPATIENT
Start: 2017-12-13

## 2017-12-13 RX ORDER — EPINEPHRINE 0.3 MG/.3ML
0.3 INJECTION SUBCUTANEOUS EVERY 5 MIN PRN
Status: CANCELLED | OUTPATIENT
Start: 2017-12-13

## 2017-12-13 RX ORDER — DIPHENHYDRAMINE HYDROCHLORIDE 50 MG/ML
50 INJECTION INTRAMUSCULAR; INTRAVENOUS
Status: CANCELLED
Start: 2018-01-03

## 2017-12-13 RX ORDER — HEPARIN SODIUM (PORCINE) LOCK FLUSH IV SOLN 100 UNIT/ML 100 UNIT/ML
5 SOLUTION INTRAVENOUS EVERY 8 HOURS
Status: CANCELLED
Start: 2018-01-10

## 2017-12-13 RX ORDER — LORAZEPAM 2 MG/ML
0.5 INJECTION INTRAMUSCULAR EVERY 4 HOURS PRN
Status: CANCELLED
Start: 2017-12-20

## 2017-12-13 RX ORDER — EPINEPHRINE 0.3 MG/.3ML
0.3 INJECTION SUBCUTANEOUS EVERY 5 MIN PRN
Status: CANCELLED | OUTPATIENT
Start: 2018-01-10

## 2017-12-13 RX ORDER — EPINEPHRINE 1 MG/ML
0.3 INJECTION, SOLUTION INTRAMUSCULAR; SUBCUTANEOUS EVERY 5 MIN PRN
Status: CANCELLED | OUTPATIENT
Start: 2018-01-03

## 2017-12-13 RX ADMIN — SODIUM CHLORIDE 250 ML: 9 INJECTION, SOLUTION INTRAVENOUS at 11:31

## 2017-12-13 RX ADMIN — SODIUM CHLORIDE, PRESERVATIVE FREE 5 ML: 5 INJECTION INTRAVENOUS at 14:48

## 2017-12-13 RX ADMIN — BORTEZOMIB 2.2 MG: 3.5 INJECTION, POWDER, LYOPHILIZED, FOR SOLUTION INTRAVENOUS; SUBCUTANEOUS at 14:25

## 2017-12-13 RX ADMIN — DARATUMUMAB 1000 MG: 100 INJECTION, SOLUTION, CONCENTRATE INTRAVENOUS at 11:31

## 2017-12-13 RX ADMIN — DEXAMETHASONE SODIUM PHOSPHATE 12 MG: 10 INJECTION, SOLUTION INTRAMUSCULAR; INTRAVENOUS at 11:04

## 2017-12-13 RX ADMIN — ACETAMINOPHEN 650 MG: 325 TABLET ORAL at 10:52

## 2017-12-13 RX ADMIN — DIPHENHYDRAMINE HYDROCHLORIDE 50 MG: 25 CAPSULE ORAL at 10:51

## 2017-12-13 ASSESSMENT — PAIN SCALES - GENERAL: PAINLEVEL: NO PAIN (0)

## 2017-12-13 NOTE — MR AVS SNAPSHOT
Amiragino Arreola   12/13/2017   Anticoagulation Therapy Visit    Description:  85 year old female   Provider:  Addy Frias MD   Department:  [facility]           INR as of 12/13/2017     Today's INR 2.20      Anticoagulation Summary as of 12/13/2017     INR goal 2.0-3.0   Today's INR 2.20   Full instructions 6 mg on Mon, Wed, Fri; 4 mg all other days   Next INR check 12/20/2017    Indications   Long-term (current) use of anticoagulants [Z79.01] [Z79.01]  Atrial fibrillation (H) [I48.91] (Resolved) [I48.91]         December 2017 Details    Sun Mon Tue Wed Thu Fri Sat          1               2                 3               4               5               6               7               8               9                 10               11               12               13      6 mg   See details      14      4 mg         15      6 mg         16      4 mg           17      4 mg         18      6 mg         19      4 mg         20            21               22               23                 24               25               26               27               28               29               30                 31                      Date Details   12/13 This INR check       Date of next INR:  12/20/2017         How to take your warfarin dose     To take:  4 mg Take 1 of the 4 mg tablets.    To take:  6 mg Take 1.5 of the 4 mg tablets.

## 2017-12-13 NOTE — MR AVS SNAPSHOT
After Visit Summary   12/13/2017    Amira Arreola    MRN: [id number]           Patient Information     Date Of Birth          7/17/1932        Visit Information        Provider Department      12/13/2017 9:30 AM  INFUSION CHAIR 12 [facility]        Today's Diagnoses     Multiple myeloma not having achieved remission (H)    -  1    Paroxysmal atrial fibrillation (H)           Follow-ups after your visit        Your next 10 appointments already scheduled     Dec 20, 2017 11:30 AM CST   Level 2 with  INFUSION CHAIR 12   [facility] ([facility])    [facility]  [address]  [place] [address]   [phone]            Jan 03, 2018  1:30 PM CST   Level 2 with  INFUSION CHAIR 2   [facility] ([facility])    [facility] [employer] [facility]  [address]  [place] [address]   [phone]            Jan 03, 2018  2:00 PM CST   Return Visit with Shayne Roberts MD   [facility] ([facility])    [facility] [employer] [facility]  [address]  [place] [address]   [phone]              Who to contact     If you have questions or need follow up information about today's clinic visit or your schedule please contact [facility] directly at [phone].  Normal or non-critical lab and imaging results will be communicated to you by MyChart, letter or phone within 4 business days after the clinic has received the results. If you do not hear from us within 7 days, please contact the clinic through MyChart or phone. If you have a critical or abnormal lab result, we will notify you by phone as soon as possible.  Submit refill requests through [employer] or call your pharmacy and they will forward the refill request to us. Please allow 3 business days for your refill  "to be completed.          Additional Information About Your Visit        DivergenceharQD Vision Information     [employer] lets you send messages to your doctor, view your test results, renew your prescriptions, schedule appointments and more. To sign up, go to [email or web address] . Click on \"Log in\" on the left side of the screen, which will take you to the Welcome page. Then click on \"Sign up Now\" on the right side of the page.     You will be asked to enter the access code listed below, as well as some personal information. Please follow the directions to create your username and password.     Your access code is: [email or web address]  Expires: 2017  9:24 AM     Your access code will  in 90 days. If you need help or a new code, please call your [place] clinic or [phone].        Care EveryWhere ID     This is your Care EveryWhere ID. This could be used by other organizations to access your [place] medical records  [id number]        Your Vitals Were     Pulse Temperature Respirations Height Pulse Oximetry BMI (Body Mass Index)    98 97.7  F (36.5  C) (Oral) 18 1.676 m (5' 5.98\") 99% 22.35 kg/m2       Blood Pressure from Last 3 Encounters:   17 145/78   17 160/79   17 160/79    Weight from Last 3 Encounters:   17 62.8 kg (138 lb 6.4 oz)   17 62.4 kg (137 lb 9.6 oz)   17 62.4 kg (137 lb 9.6 oz)              We Performed the Following     CBC with platelets differential     Hepatic panel     INR     Kappa and lambda light chain     Protein electrophoresis          Today's Medication Changes          These changes are accurate as of: 17  3:23 PM.  If you have any questions, ask your nurse or doctor.               These medicines have changed or have updated prescriptions.        Dose/Directions    * dexamethasone 4 MG tablet   Commonly known as:  DECADRON   This may have changed:  Another medication with the same name was added. Make sure you understand how and when to take " each.   Used for:  Multiple myeloma not having achieved remission (H)        Take 20mg (5 tablets) by mouth every week on the morning of velcade injection.   Quantity:  28 tablet   Refills:  0       * dexamethasone 4 MG tablet   Commonly known as:  DECADRON   This may have changed:  You were already taking a medication with the same name, and this prescription was added. Make sure you understand how and when to take each.   Used for:  Multiple myeloma not having achieved remission (H)        Take 20mg (5 tablets) by mouth every week on the morning of velcade injection.   Quantity:  28 tablet   Refills:  0       * Notice:  This list has 2 medication(s) that are the same as other medications prescribed for you. Read the directions carefully, and ask your doctor or other care provider to review them with you.         Where to get your medicines      These medications were sent to [facility] [id number]  [place], [place] - [address] AT [facility] [address]  [address], [place] [address]     Phone:  [phone]     dexamethasone 4 MG tablet                Primary Care Provider Office Phone # Fax #    Addy Frias [phone] [address] [address]        Equal Access to Services     SARA ZELAYA AH: Hadii liane murcia hadasho Sojaylonali, waaxda luqadaha, qaybta kaalmada adeegyada, hung sadler. So [facility] [phone].    ATENCIÓN: Si habla español, tiene a barlow disposición servicios gratuitos de asistencia lingüística. Llame al [phone].    We comply with applicable federal civil rights laws and [place] laws. We do not discriminate on the basis of race, color, national origin, age, disability, sex, sexual orientation, or gender identity.            Thank you!     Thank you for choosing [facility]  for your care. Our goal is always to provide you with excellent care. Hearing back from our patients is  one way we can continue to improve our services. Please take a few minutes to complete the written survey that you may receive in the mail after your visit with us. Thank you!             Your Updated Medication List - Protect others around you: Learn how to safely use, store and throw away your medicines at www.disposemymeds.org.          This list is accurate as of: 12/13/17  3:23 PM.  Always use your most recent med list.                   Brand Name Dispense Instructions for use Diagnosis    acyclovir 400 MG tablet    ZOVIRAX    60 tablet    Take 1 tablet (400 mg) by mouth 2 times daily Viral Prophylaxis.    Multiple myeloma not having achieved remission (H)       CALCIUM CITRATE + PO      Take 2,000 mg by mouth daily 2 tabs        carboxymethylcellulose 0.5 % Soln ophthalmic solution    REFRESH PLUS     1 drop 4 times daily        CLARINEX PO      Take by mouth daily Taking claritin        COMPAZINE PO      Take 10 mg by mouth daily as needed        cycloSPORINE 0.05 % ophthalmic emulsion    RESTASIS     Place 1 drop into both eyes every 12 hours        DAILY MULTIVITAMIN PO      Take 1 tablet by mouth daily.    Routine general medical examination at a health care facility       * dexamethasone 4 MG tablet    DECADRON    28 tablet    Take 20mg (5 tablets) by mouth every week on the morning of velcade injection.    Multiple myeloma not having achieved remission (H)       * dexamethasone 4 MG tablet    DECADRON    28 tablet    Take 20mg (5 tablets) by mouth every week on the morning of velcade injection.    Multiple myeloma not having achieved remission (H)       erythromycin ophthalmic ointment    ROMYCIN     Place 1 Application into both eyes At Bedtime        GENTLE STOOL SOFTENER PO      Take 100 mg by mouth daily        lidocaine-prilocaine cream    EMLA    30 g    Apply topically as needed for moderate pain Apply dollop size amount to port site 30-60 min prior to accessing    Multiple myeloma not having  achieved remission (H)       LORazepam 0.5 MG tablet    ATIVAN    30 tablet    Take 1 tablet (0.5 mg) by mouth every 8 hours as needed for anxiety    Multiple myeloma not having achieved remission (H)       metoprolol 50 MG 24 hr tablet    TOPROL XL    270 tablet    Take 2 tablets (100mg) in the morning and 1 tablet (50mg) in the evening by mouth daily    Paroxysmal atrial fibrillation (H)       oxyCODONE IR 15 MG tablet    ROXICODONE    60 tablet    Take 1 tablet (15 mg) by mouth every 8 hours as needed for pain maximum 4 tablet(s) per day    Multiple myeloma not having achieved remission (H)       polyethylene glycol powder    MIRALAX/GLYCOLAX     Take 1 capful by mouth daily as needed    Bilateral leg edema       timolol 0.25 % ophthalmic solution    TIMOPTIC     Place 1 drop into the right eye 2 times daily        TYLENOL PO      Take 500 mg by mouth every 6 hours as needed for mild pain or fever        UNABLE TO FIND      MEDICATION NAME: Fresh Coat eye drops        VITAMIN D3 PO      Take 1,000 Units by mouth daily        warfarin 4 MG tablet    COUMADIN    110 tablet    TAKE ONE AND ONE-HALF TABLETS BY MOUTH ON MONDAY, WEDNESDAY, AND FRIDAY AND ONE TABLET THE OTHER DAYS OF THE WEEK    Paroxysmal atrial fibrillation (H), Long-term (current) use of anticoagulants       ZOMETA IV      Inject into the vein every 30 days Every 3 month dosing        * Notice:  This list has 2 medication(s) that are the same as other medications prescribed for you. Read the directions carefully, and ask your doctor or other care provider to review them with you.

## 2017-12-13 NOTE — PROGRESS NOTES
ANTICOAGULATION FOLLOW-UP CLINIC VISIT    Patient Name:  Amira Arreola  Date:  12/13/2017  Contact Type:  result note in Epic    SUBJECTIVE:     Patient Findings     Positives No Problem Findings           OBJECTIVE    INR   Date Value Ref Range Status   12/13/2017 2.20 (H) 0.86 - 1.14 Final       ASSESSMENT / PLAN  INR assessment THER    Recheck INR In: 1 WEEK    INR Location Outside lab      Anticoagulation Summary as of 12/13/2017     INR goal 2.0-3.0   Today's INR 2.20   Maintenance plan 6 mg (4 mg x 1.5) on Mon, Wed, Fri; 4 mg (4 mg x 1) all other days   Full instructions 6 mg on Mon, Wed, Fri; 4 mg all other days   Weekly total 34 mg   No change documented Tigist Corral RN   Plan last modified Cintia Powers RN (11/29/2017)   Next INR check 12/20/2017   Target end date Indefinite    Indications   Long-term (current) use of anticoagulants [Z79.01] [Z79.01]  Atrial fibrillation (H) [I48.91] (Resolved) [I48.91]         Anticoagulation Episode Summary     INR check location     Preferred lab     Send INR reminders to [facility]    Comments patient have standing INR order to be draw at infusion visit.  advise to call [facility] when have INR draw for dosing instruction.  patient can be reach at home phone [phone]      Anticoagulation Care Providers     Provider Role Specialty Phone number    Addy Frias MD [facility] Internal Medicine [phone]            See the Encounter Report to view Anticoagulation Flowsheet and Dosing Calendar (Go to Encounters tab in chart review, and find the Anticoagulation Therapy Visit)    Dosage adjustment made based on physician directed care plan. INR 2.2 per result note in Epic. PCP routed to INR for dosing. Left VM for patient to continue same warfarin dosing and recheck in 1 week. (she has INR's done at the infusion center)    Tigist Corral, SHELLIE

## 2017-12-14 LAB
ALBUMIN SERPL ELPH-MCNC: 3.9 G/DL (ref 3.7–5.1)
ALPHA1 GLOB SERPL ELPH-MCNC: 0.3 G/DL (ref 0.2–0.4)
ALPHA2 GLOB SERPL ELPH-MCNC: 0.7 G/DL (ref 0.5–0.9)
B-GLOBULIN SERPL ELPH-MCNC: 0.6 G/DL (ref 0.6–1)
GAMMA GLOB SERPL ELPH-MCNC: 0.3 G/DL (ref 0.7–1.6)
KAPPA LC UR-MCNC: 2.68 MG/DL (ref 0.33–1.94)
KAPPA LC/LAMBDA SER: 4.96 {RATIO} (ref 0.26–1.65)
LAMBDA LC SERPL-MCNC: 0.54 MG/DL (ref 0.57–2.63)
M PROTEIN SERPL ELPH-MCNC: 0.1 G/DL
PROT PATTERN SERPL ELPH-IMP: ABNORMAL

## 2017-12-20 ENCOUNTER — HOSPITAL ENCOUNTER (OUTPATIENT)
Facility: CLINIC | Age: 82
Setting detail: SPECIMEN
Discharge: HOME | End: 2017-12-20
Attending: INTERNAL MEDICINE | Admitting: INTERNAL MEDICINE
Payer: MEDICARE

## 2017-12-20 ENCOUNTER — ANTICOAGULATION THERAPY VISIT (OUTPATIENT)
Dept: FAMILY MEDICINE | Facility: CLINIC | Age: 82
End: 2017-12-20
Payer: COMMERCIAL

## 2017-12-20 ENCOUNTER — INFUSION THERAPY VISIT (OUTPATIENT)
Dept: INFUSION THERAPY | Facility: CLINIC | Age: 82
End: 2017-12-20
Attending: INTERNAL MEDICINE
Payer: MEDICARE

## 2017-12-20 VITALS
BODY MASS INDEX: 22.21 KG/M2 | HEART RATE: 89 BPM | DIASTOLIC BLOOD PRESSURE: 92 MMHG | TEMPERATURE: 97.5 F | HEIGHT: 66 IN | RESPIRATION RATE: 16 BRPM | OXYGEN SATURATION: 97 % | SYSTOLIC BLOOD PRESSURE: 152 MMHG | WEIGHT: 138.2 LBS

## 2017-12-20 DIAGNOSIS — C90.00 MULTIPLE MYELOMA NOT HAVING ACHIEVED REMISSION (H): ICD-10-CM

## 2017-12-20 DIAGNOSIS — I48.0 PAROXYSMAL ATRIAL FIBRILLATION (H): Primary | ICD-10-CM

## 2017-12-20 DIAGNOSIS — Z79.01 LONG-TERM (CURRENT) USE OF ANTICOAGULANTS: ICD-10-CM

## 2017-12-20 DIAGNOSIS — Z95.828 PORTACATH IN PLACE: ICD-10-CM

## 2017-12-20 LAB
BASOPHILS # BLD AUTO: 0 10E9/L (ref 0–0.2)
BASOPHILS NFR BLD AUTO: 0 %
DIFFERENTIAL METHOD BLD: ABNORMAL
EOSINOPHIL # BLD AUTO: 0 10E9/L (ref 0–0.7)
EOSINOPHIL NFR BLD AUTO: 0.6 %
ERYTHROCYTE [DISTWIDTH] IN BLOOD BY AUTOMATED COUNT: 13.9 % (ref 10–15)
HCT VFR BLD AUTO: 36.1 % (ref 35–47)
HGB BLD-MCNC: 12.3 G/DL (ref 11.7–15.7)
IMM GRANULOCYTES # BLD: 0 10E9/L (ref 0–0.4)
IMM GRANULOCYTES NFR BLD: 0.1 %
INR PPP: 3.02 (ref 0.86–1.14)
LYMPHOCYTES # BLD AUTO: 0.9 10E9/L (ref 0.8–5.3)
LYMPHOCYTES NFR BLD AUTO: 12.8 %
MCH RBC QN AUTO: 34.6 PG (ref 26.5–33)
MCHC RBC AUTO-ENTMCNC: 34.1 G/DL (ref 31.5–36.5)
MCV RBC AUTO: 101 FL (ref 78–100)
MONOCYTES # BLD AUTO: 0.9 10E9/L (ref 0–1.3)
MONOCYTES NFR BLD AUTO: 13.4 %
NEUTROPHILS # BLD AUTO: 5.1 10E9/L (ref 1.6–8.3)
NEUTROPHILS NFR BLD AUTO: 73.1 %
NRBC # BLD AUTO: 0 10*3/UL
NRBC BLD AUTO-RTO: 0 /100
PLATELET # BLD AUTO: 141 10E9/L (ref 150–450)
RBC # BLD AUTO: 3.56 10E12/L (ref 3.8–5.2)
WBC # BLD AUTO: 7 10E9/L (ref 4–11)

## 2017-12-20 PROCEDURE — 99207 ZZC NO CHARGE NURSE ONLY: CPT | Performed by: INTERNAL MEDICINE

## 2017-12-20 PROCEDURE — 85610 PROTHROMBIN TIME: CPT | Performed by: INTERNAL MEDICINE

## 2017-12-20 PROCEDURE — 85025 COMPLETE CBC W/AUTO DIFF WBC: CPT | Performed by: INTERNAL MEDICINE

## 2017-12-20 PROCEDURE — 25000128 H RX IP 250 OP 636: Performed by: INTERNAL MEDICINE

## 2017-12-20 PROCEDURE — 96401 CHEMO ANTI-NEOPL SQ/IM: CPT

## 2017-12-20 RX ORDER — HEPARIN SODIUM (PORCINE) LOCK FLUSH IV SOLN 100 UNIT/ML 100 UNIT/ML
500 SOLUTION INTRAVENOUS EVERY 8 HOURS
Status: DISCONTINUED | OUTPATIENT
Start: 2017-12-20 | End: 2017-12-20 | Stop reason: HOSPADM

## 2017-12-20 RX ORDER — HEPARIN SODIUM (PORCINE) LOCK FLUSH IV SOLN 100 UNIT/ML 100 UNIT/ML
500 SOLUTION INTRAVENOUS EVERY 8 HOURS
Status: CANCELLED
Start: 2017-12-20

## 2017-12-20 RX ADMIN — SODIUM CHLORIDE, PRESERVATIVE FREE 500 UNITS: 5 INJECTION INTRAVENOUS at 13:01

## 2017-12-20 RX ADMIN — BORTEZOMIB 2.2 MG: 3.5 INJECTION, POWDER, LYOPHILIZED, FOR SOLUTION INTRAVENOUS; SUBCUTANEOUS at 13:04

## 2017-12-20 ASSESSMENT — PAIN SCALES - GENERAL: PAINLEVEL: NO PAIN (0)

## 2017-12-20 NOTE — PROGRESS NOTES
ANTICOAGULATION FOLLOW-UP CLINIC VISIT    Patient Name:  Amira Arreola  Date:  12/20/2017  Contact Type:  Telephone/ Dose instructions left on patient's voicemail    SUBJECTIVE:     Patient Findings     Positives No Problem Findings    Comments Lab done at infusion center           OBJECTIVE    INR   Date Value Ref Range Status   12/20/2017 3.02 (H) 0.86 - 1.14 Final       ASSESSMENT / PLAN  INR assessment THER    Recheck INR In: 1 WEEK    INR Location Outside lab      Anticoagulation Summary as of 12/20/2017     INR goal 2.0-3.0   Today's INR No new INR was available at the time of this encounter.   Maintenance plan 6 mg (4 mg x 1.5) on Mon, Wed, Fri; 4 mg (4 mg x 1) all other days   Full instructions 6 mg on Mon, Wed, Fri; 4 mg all other days   Weekly total 34 mg   No change documented Cintia Powers RN   Plan last modified Cintia Powers RN (11/29/2017)   Next INR check 12/27/2017   Target end date Indefinite    Indications   Long-term (current) use of anticoagulants [Z79.01] [Z79.01]  Atrial fibrillation (H) [I48.91] (Resolved) [I48.91]         Anticoagulation Episode Summary     INR check location     Preferred lab     Send INR reminders to [facility]    Comments patient have standing INR order to be draw at infusion visit.  advise to call [facility] when have INR draw for dosing instruction.  patient can be reach at home phone [phone]      Anticoagulation Care Providers     Provider Role Specialty Phone number    Addy Frias MD [facility] Internal Medicine [phone]            See the Encounter Report to view Anticoagulation Flowsheet and Dosing Calendar (Go to Encounters tab in chart review, and find the Anticoagulation Therapy Visit)    Dosage adjustment made based on physician directed care plan.    Cintia Powers RN

## 2017-12-20 NOTE — MR AVS SNAPSHOT
After Visit Summary   12/20/2017    Amira Arreola    MRN: [id number]           Patient Information     Date Of Birth          7/17/1932        Visit Information        Provider Department      12/20/2017 11:30 AM  INFUSION CHAIR 12 [facility]        Today's Diagnoses     Paroxysmal atrial fibrillation (H)    -  1    Multiple myeloma not having achieved remission (H)        Portacath in place           Follow-ups after your visit        Your next 10 appointments already scheduled     Jan 03, 2018  1:30 PM CST   Level 2 with  INFUSION CHAIR 2   [facility] ([facility])    [facility]  [address]  [place] [address]   [phone]            Jan 03, 2018  2:00 PM CST   Return Visit with Shayne Roberts MD   [facility] ([facility])    [facility]  [address]  [place] [address] [phone]              Who to contact     If you have questions or need follow up information about today's clinic visit or your schedule please contact [facility] [place] directly at [phone].  Normal or non-critical lab and imaging results will be communicated to you by MyChart, letter or phone within 4 business days after the clinic has received the results. If you do not hear from us within 7 days, please contact the clinic through [employer] or phone. If you have a critical or abnormal lab result, we will notify you by phone as soon as possible.  Submit refill requests through [employer] or call your pharmacy and they will forward the refill request to us. Please allow 3 business days for your refill to be completed.          Additional Information About Your Visit        NLT SPINEharPhotoblog Information     [employer] lets you send messages to your doctor, view your test results, renew your prescriptions, schedule appointments  "and more. To sign up, go to [email or web address] . Click on \"Log in\" on the left side of the screen, which will take you to the Welcome page. Then click on \"Sign up Now\" on the right side of the page.     You will be asked to enter the access code listed below, as well as some personal information. Please follow the directions to create your username and password.     Your access code is: [email or web address]  Expires: 2017  9:24 AM     Your access code will  in 90 days. If you need help or a new code, please call your [place] clinic or [phone].        Care EveryWhere ID     This is your Care EveryWhere ID. This could be used by other organizations to access your [place] medical records  [id number]        Your Vitals Were     Pulse Temperature Respirations Height Pulse Oximetry BMI (Body Mass Index)    89 97.5  F (36.4  C) (Oral) 16 1.676 m (5' 5.98\") 97% 22.32 kg/m2       Blood Pressure from Last 3 Encounters:   17 (!) 152/92   17 151/89   17 160/79    Weight from Last 3 Encounters:   17 62.7 kg (138 lb 3.2 oz)   17 62.8 kg (138 lb 6.4 oz)   17 62.4 kg (137 lb 9.6 oz)              We Performed the Following     CBC with platelets differential     INR        Primary Care Provider Office Phone # Fax #    Addy Frias [phone] [address]  [place] [address]        Equal Access to Services     [facility] Hospital[facility] : Hadii liane Galloway, vivianda brenda, qaybta christianalhung blandon . So [facility] [phone].    ATENCIÓN: Si habla español, tiene a barlow disposición servicios gratuitos de asistencia lingüística. Llame al [phone].    We comply with applicable federal civil rights laws and [place] laws. We do not discriminate on the basis of race, color, national origin, age, disability, sex, sexual orientation, or gender identity.            Thank you!     Thank you for choosing " [facility]  for your care. Our goal is always to provide you with excellent care. Hearing back from our patients is one way we can continue to improve our services. Please take a few minutes to complete the written survey that you may receive in the mail after your visit with us. Thank you!             Your Updated Medication List - Protect others around you: Learn how to safely use, store and throw away your medicines at www.disposemymeds.org.          This list is accurate as of: 12/20/17  2:23 PM.  Always use your most recent med list.                   Brand Name Dispense Instructions for use Diagnosis    acyclovir 400 MG tablet    ZOVIRAX    60 tablet    Take 1 tablet (400 mg) by mouth 2 times daily Viral Prophylaxis.    Multiple myeloma not having achieved remission (H)       CALCIUM CITRATE + PO      Take 2,000 mg by mouth daily 2 tabs        carboxymethylcellulose 0.5 % Soln ophthalmic solution    REFRESH PLUS     1 drop 4 times daily        CLARINEX PO      Take by mouth daily Taking claritin        COMPAZINE PO      Take 10 mg by mouth daily as needed        cycloSPORINE 0.05 % ophthalmic emulsion    RESTASIS     Place 1 drop into both eyes every 12 hours        DAILY MULTIVITAMIN PO      Take 1 tablet by mouth daily.    Routine general medical examination at a health care facility       dexamethasone 4 MG tablet    DECADRON    28 tablet    Take 20mg (5 tablets) by mouth every week on the morning of velcade injection.    Multiple myeloma not having achieved remission (H)       erythromycin ophthalmic ointment    ROMYCIN     Place 1 Application into both eyes At Bedtime        GENTLE STOOL SOFTENER PO      Take 100 mg by mouth daily        lidocaine-prilocaine cream    EMLA    30 g    Apply topically as needed for moderate pain Apply dollop size amount to port site 30-60 min prior to accessing    Multiple myeloma not having achieved remission (H)       LORazepam 0.5 MG  tablet    ATIVAN    30 tablet    Take 1 tablet (0.5 mg) by mouth every 8 hours as needed for anxiety    Multiple myeloma not having achieved remission (H)       metoprolol 50 MG 24 hr tablet    TOPROL XL    270 tablet    Take 2 tablets (100mg) in the morning and 1 tablet (50mg) in the evening by mouth daily    Paroxysmal atrial fibrillation (H)       oxyCODONE IR 15 MG tablet    ROXICODONE    60 tablet    Take 1 tablet (15 mg) by mouth every 8 hours as needed for pain maximum 4 tablet(s) per day    Multiple myeloma not having achieved remission (H)       polyethylene glycol powder    MIRALAX/GLYCOLAX     Take 1 capful by mouth daily as needed    Bilateral leg edema       TYLENOL PO      Take 500 mg by mouth every 6 hours as needed for mild pain or fever        UNABLE TO FIND      MEDICATION NAME: Fresh Coat eye drops        VITAMIN D3 PO      Take 1,000 Units by mouth daily        warfarin 4 MG tablet    COUMADIN    110 tablet    TAKE ONE AND ONE-HALF TABLETS BY MOUTH ON MONDAY, WEDNESDAY, AND FRIDAY AND ONE TABLET THE OTHER DAYS OF THE WEEK    Paroxysmal atrial fibrillation (H), Long-term (current) use of anticoagulants       ZOMETA IV      Inject into the vein every 30 days Every 3 month dosing

## 2017-12-20 NOTE — PROGRESS NOTES
"Infusion Nursing Note:  Amira Arreola presents today for C8D8 Velcade.    Patient seen by provider today: No   present during visit today: Not Applicable.    Note: Refer to doc flowsheet for assessment, no new concerns today other than bloating. Patient verified she is taking oral dexamethasone and acyclovir as ordered. Denies need for refills at this time. INR drawn per patient request.    Intravenous Access:  Labs drawn without difficulty.  Implanted Port.    Treatment Conditions:  Lab Results   Component Value Date    HGB 12.3 12/20/2017     Lab Results   Component Value Date    WBC 7.0 12/20/2017      Lab Results   Component Value Date    ANEU 5.1 12/20/2017     Lab Results   Component Value Date     12/20/2017      Results reviewed, labs MET treatment parameters, ok to proceed with treatment.        Post Infusion Assessment:  Patient tolerated injection without incident.  Site patent and intact, free from redness, edema or discomfort.  No evidence of extravasations.  Access discontinued per protocol.    Discharge Plan:   Discharge instructions reviewed with: Patient.  Patient and/or family verbalized understanding of discharge instructions and all questions answered.  Copy of AVS reviewed with patient and/or family.  Patient will return 1/3/18 for next appointment (per patient, she has \"a week off\").   Patient discharged in stable condition accompanied by: self.  Departure Mode: Ambulatory.    Allyn Corona RN      "

## 2017-12-20 NOTE — MR AVS SNAPSHOT
Amira IVY Roseon   12/20/2017   Anticoagulation Therapy Visit    Description:  85 year old female   Provider:  Addy Frias MD   Department:  [facility]           INR as of 12/20/2017     Today's INR No new INR was available at the time of this encounter.      Anticoagulation Summary as of 12/20/2017     INR goal 2.0-3.0   Today's INR No new INR was available at the time of this encounter.   Full instructions 6 mg on Mon, Wed, Fri; 4 mg all other days   Next INR check 12/27/2017    Indications   Long-term (current) use of anticoagulants [Z79.01] [Z79.01]  Atrial fibrillation (H) [I48.91] (Resolved) [I48.91]         December 2017 Details    Sun Mon Tue Wed Thu Fri Sat          1               2                 3               4               5               6               7               8               9                 10               11               12               13               14               15               16                 17               18               19               20      6 mg   See details      21      4 mg         22      6 mg         23      4 mg           24      4 mg         25      6 mg         26      4 mg         27            28               29               30                 31                      Date Details   12/20 This INR check       Date of next INR:  12/27/2017         How to take your warfarin dose     To take:  4 mg Take 1 of the 4 mg tablets.    To take:  6 mg Take 1.5 of the 4 mg tablets.

## 2018-01-03 ENCOUNTER — ONCOLOGY VISIT (OUTPATIENT)
Dept: ONCOLOGY | Facility: CLINIC | Age: 83
End: 2018-01-03
Attending: INTERNAL MEDICINE
Payer: MEDICARE

## 2018-01-03 ENCOUNTER — HOSPITAL ENCOUNTER (OUTPATIENT)
Facility: CLINIC | Age: 83
Setting detail: SPECIMEN
Discharge: HOME | End: 2018-01-03
Attending: INTERNAL MEDICINE | Admitting: INTERNAL MEDICINE
Payer: MEDICARE

## 2018-01-03 ENCOUNTER — ANTICOAGULATION THERAPY VISIT (OUTPATIENT)
Dept: FAMILY MEDICINE | Facility: CLINIC | Age: 83
End: 2018-01-03
Payer: COMMERCIAL

## 2018-01-03 ENCOUNTER — INFUSION THERAPY VISIT (OUTPATIENT)
Dept: INFUSION THERAPY | Facility: CLINIC | Age: 83
End: 2018-01-03
Attending: INTERNAL MEDICINE
Payer: MEDICARE

## 2018-01-03 VITALS
TEMPERATURE: 98.1 F | SYSTOLIC BLOOD PRESSURE: 136 MMHG | WEIGHT: 145 LBS | RESPIRATION RATE: 18 BRPM | DIASTOLIC BLOOD PRESSURE: 81 MMHG | OXYGEN SATURATION: 97 % | BODY MASS INDEX: 23.3 KG/M2 | HEIGHT: 66 IN

## 2018-01-03 VITALS
DIASTOLIC BLOOD PRESSURE: 81 MMHG | TEMPERATURE: 98.1 F | BODY MASS INDEX: 23.3 KG/M2 | HEIGHT: 66 IN | RESPIRATION RATE: 18 BRPM | SYSTOLIC BLOOD PRESSURE: 136 MMHG | OXYGEN SATURATION: 97 % | WEIGHT: 145 LBS

## 2018-01-03 DIAGNOSIS — C90.00 MULTIPLE MYELOMA NOT HAVING ACHIEVED REMISSION (H): Primary | ICD-10-CM

## 2018-01-03 DIAGNOSIS — Z95.828 PORTACATH IN PLACE: ICD-10-CM

## 2018-01-03 DIAGNOSIS — Z79.01 LONG-TERM (CURRENT) USE OF ANTICOAGULANTS: ICD-10-CM

## 2018-01-03 DIAGNOSIS — I48.0 PAROXYSMAL ATRIAL FIBRILLATION (H): ICD-10-CM

## 2018-01-03 LAB
BASOPHILS # BLD AUTO: 0 10E9/L (ref 0–0.2)
BASOPHILS NFR BLD AUTO: 0.1 %
DIFFERENTIAL METHOD BLD: ABNORMAL
EOSINOPHIL # BLD AUTO: 0 10E9/L (ref 0–0.7)
EOSINOPHIL NFR BLD AUTO: 0 %
ERYTHROCYTE [DISTWIDTH] IN BLOOD BY AUTOMATED COUNT: 13.5 % (ref 10–15)
HCT VFR BLD AUTO: 35.1 % (ref 35–47)
HGB BLD-MCNC: 11.8 G/DL (ref 11.7–15.7)
IMM GRANULOCYTES # BLD: 0 10E9/L (ref 0–0.4)
IMM GRANULOCYTES NFR BLD: 0.3 %
INR PPP: 2.48 (ref 0.86–1.14)
LYMPHOCYTES # BLD AUTO: 0.4 10E9/L (ref 0.8–5.3)
LYMPHOCYTES NFR BLD AUTO: 5.1 %
MCH RBC QN AUTO: 34 PG (ref 26.5–33)
MCHC RBC AUTO-ENTMCNC: 33.6 G/DL (ref 31.5–36.5)
MCV RBC AUTO: 101 FL (ref 78–100)
MONOCYTES # BLD AUTO: 0.3 10E9/L (ref 0–1.3)
MONOCYTES NFR BLD AUTO: 3.6 %
NEUTROPHILS # BLD AUTO: 6.6 10E9/L (ref 1.6–8.3)
NEUTROPHILS NFR BLD AUTO: 90.9 %
PLATELET # BLD AUTO: 150 10E9/L (ref 150–450)
RBC # BLD AUTO: 3.47 10E12/L (ref 3.8–5.2)
WBC # BLD AUTO: 7.3 10E9/L (ref 4–11)

## 2018-01-03 PROCEDURE — 99207 ZZC NO CHARGE NURSE ONLY: CPT | Performed by: INTERNAL MEDICINE

## 2018-01-03 PROCEDURE — 25000128 H RX IP 250 OP 636: Performed by: INTERNAL MEDICINE

## 2018-01-03 PROCEDURE — G0463 HOSPITAL OUTPT CLINIC VISIT: HCPCS

## 2018-01-03 PROCEDURE — 85025 COMPLETE CBC W/AUTO DIFF WBC: CPT | Performed by: INTERNAL MEDICINE

## 2018-01-03 PROCEDURE — 99214 OFFICE O/P EST MOD 30 MIN: CPT | Performed by: INTERNAL MEDICINE

## 2018-01-03 PROCEDURE — 96401 CHEMO ANTI-NEOPL SQ/IM: CPT

## 2018-01-03 PROCEDURE — 85610 PROTHROMBIN TIME: CPT | Performed by: INTERNAL MEDICINE

## 2018-01-03 RX ORDER — MEPERIDINE HYDROCHLORIDE 25 MG/ML
25 INJECTION INTRAMUSCULAR; INTRAVENOUS; SUBCUTANEOUS EVERY 30 MIN PRN
Status: CANCELLED | OUTPATIENT
Start: 2018-01-24

## 2018-01-03 RX ORDER — MEPERIDINE HYDROCHLORIDE 25 MG/ML
25 INJECTION INTRAMUSCULAR; INTRAVENOUS; SUBCUTANEOUS EVERY 30 MIN PRN
Status: CANCELLED | OUTPATIENT
Start: 2018-01-17

## 2018-01-03 RX ORDER — METHYLPREDNISOLONE SODIUM SUCCINATE 125 MG/2ML
125 INJECTION, POWDER, LYOPHILIZED, FOR SOLUTION INTRAMUSCULAR; INTRAVENOUS
Status: CANCELLED
Start: 2018-01-31

## 2018-01-03 RX ORDER — EPINEPHRINE 0.3 MG/.3ML
0.3 INJECTION SUBCUTANEOUS EVERY 5 MIN PRN
Status: CANCELLED | OUTPATIENT
Start: 2018-01-31

## 2018-01-03 RX ORDER — ALBUTEROL SULFATE 0.83 MG/ML
2.5 SOLUTION RESPIRATORY (INHALATION)
Status: CANCELLED | OUTPATIENT
Start: 2018-02-07

## 2018-01-03 RX ORDER — EPINEPHRINE 1 MG/ML
0.3 INJECTION, SOLUTION INTRAMUSCULAR; SUBCUTANEOUS EVERY 5 MIN PRN
Status: CANCELLED | OUTPATIENT
Start: 2018-01-24

## 2018-01-03 RX ORDER — HEPARIN SODIUM (PORCINE) LOCK FLUSH IV SOLN 100 UNIT/ML 100 UNIT/ML
5 SOLUTION INTRAVENOUS EVERY 8 HOURS
Status: CANCELLED
Start: 2018-01-17

## 2018-01-03 RX ORDER — SODIUM CHLORIDE 9 MG/ML
1000 INJECTION, SOLUTION INTRAVENOUS CONTINUOUS PRN
Status: CANCELLED
Start: 2018-01-24

## 2018-01-03 RX ORDER — LORAZEPAM 2 MG/ML
0.5 INJECTION INTRAMUSCULAR EVERY 4 HOURS PRN
Status: CANCELLED
Start: 2018-01-31

## 2018-01-03 RX ORDER — HEPARIN SODIUM (PORCINE) LOCK FLUSH IV SOLN 100 UNIT/ML 100 UNIT/ML
500 SOLUTION INTRAVENOUS EVERY 8 HOURS
Status: CANCELLED
Start: 2018-01-03

## 2018-01-03 RX ORDER — HEPARIN SODIUM (PORCINE) LOCK FLUSH IV SOLN 100 UNIT/ML 100 UNIT/ML
5 SOLUTION INTRAVENOUS EVERY 8 HOURS
Status: CANCELLED
Start: 2018-01-31

## 2018-01-03 RX ORDER — EPINEPHRINE 1 MG/ML
0.3 INJECTION, SOLUTION INTRAMUSCULAR; SUBCUTANEOUS EVERY 5 MIN PRN
Status: CANCELLED | OUTPATIENT
Start: 2018-01-31

## 2018-01-03 RX ORDER — DIPHENHYDRAMINE HYDROCHLORIDE 50 MG/ML
50 INJECTION INTRAMUSCULAR; INTRAVENOUS
Status: CANCELLED
Start: 2018-01-17

## 2018-01-03 RX ORDER — HEPARIN SODIUM (PORCINE) LOCK FLUSH IV SOLN 100 UNIT/ML 100 UNIT/ML
5 SOLUTION INTRAVENOUS EVERY 8 HOURS
Status: CANCELLED
Start: 2018-01-24

## 2018-01-03 RX ORDER — MEPERIDINE HYDROCHLORIDE 25 MG/ML
25 INJECTION INTRAMUSCULAR; INTRAVENOUS; SUBCUTANEOUS EVERY 30 MIN PRN
Status: CANCELLED | OUTPATIENT
Start: 2018-02-07

## 2018-01-03 RX ORDER — EPINEPHRINE 0.3 MG/.3ML
0.3 INJECTION SUBCUTANEOUS EVERY 5 MIN PRN
Status: CANCELLED | OUTPATIENT
Start: 2018-01-24

## 2018-01-03 RX ORDER — ALBUTEROL SULFATE 0.83 MG/ML
2.5 SOLUTION RESPIRATORY (INHALATION)
Status: CANCELLED | OUTPATIENT
Start: 2018-01-24

## 2018-01-03 RX ORDER — HEPARIN SODIUM (PORCINE) LOCK FLUSH IV SOLN 100 UNIT/ML 100 UNIT/ML
500 SOLUTION INTRAVENOUS EVERY 8 HOURS
Status: DISCONTINUED | OUTPATIENT
Start: 2018-01-03 | End: 2018-01-03 | Stop reason: HOSPADM

## 2018-01-03 RX ORDER — ACETAMINOPHEN 325 MG/1
650 TABLET ORAL ONCE
Status: CANCELLED | OUTPATIENT
Start: 2018-01-17

## 2018-01-03 RX ORDER — ALBUTEROL SULFATE 90 UG/1
1-2 AEROSOL, METERED RESPIRATORY (INHALATION)
Status: CANCELLED
Start: 2018-01-17

## 2018-01-03 RX ORDER — MEPERIDINE HYDROCHLORIDE 25 MG/ML
25 INJECTION INTRAMUSCULAR; INTRAVENOUS; SUBCUTANEOUS EVERY 30 MIN PRN
Status: CANCELLED | OUTPATIENT
Start: 2018-01-31

## 2018-01-03 RX ORDER — EPINEPHRINE 1 MG/ML
0.3 INJECTION, SOLUTION INTRAMUSCULAR; SUBCUTANEOUS EVERY 5 MIN PRN
Status: CANCELLED | OUTPATIENT
Start: 2018-01-17

## 2018-01-03 RX ORDER — EPINEPHRINE 1 MG/ML
0.3 INJECTION, SOLUTION INTRAMUSCULAR; SUBCUTANEOUS EVERY 5 MIN PRN
Status: CANCELLED | OUTPATIENT
Start: 2018-02-07

## 2018-01-03 RX ORDER — DIPHENHYDRAMINE HYDROCHLORIDE 50 MG/ML
50 INJECTION INTRAMUSCULAR; INTRAVENOUS
Status: CANCELLED
Start: 2018-01-31

## 2018-01-03 RX ORDER — ALBUTEROL SULFATE 0.83 MG/ML
2.5 SOLUTION RESPIRATORY (INHALATION)
Status: CANCELLED | OUTPATIENT
Start: 2018-01-31

## 2018-01-03 RX ORDER — METHYLPREDNISOLONE SODIUM SUCCINATE 125 MG/2ML
125 INJECTION, POWDER, LYOPHILIZED, FOR SOLUTION INTRAMUSCULAR; INTRAVENOUS
Status: CANCELLED
Start: 2018-01-17

## 2018-01-03 RX ORDER — ALBUTEROL SULFATE 90 UG/1
1-2 AEROSOL, METERED RESPIRATORY (INHALATION)
Status: CANCELLED
Start: 2018-02-07

## 2018-01-03 RX ORDER — DIPHENHYDRAMINE HYDROCHLORIDE 50 MG/ML
50 INJECTION INTRAMUSCULAR; INTRAVENOUS
Status: CANCELLED
Start: 2018-02-07

## 2018-01-03 RX ORDER — HEPARIN SODIUM (PORCINE) LOCK FLUSH IV SOLN 100 UNIT/ML 100 UNIT/ML
5 SOLUTION INTRAVENOUS EVERY 8 HOURS
Status: CANCELLED
Start: 2018-02-07

## 2018-01-03 RX ORDER — SODIUM CHLORIDE 9 MG/ML
1000 INJECTION, SOLUTION INTRAVENOUS CONTINUOUS PRN
Status: CANCELLED
Start: 2018-01-31

## 2018-01-03 RX ORDER — DIPHENHYDRAMINE HYDROCHLORIDE 50 MG/ML
50 INJECTION INTRAMUSCULAR; INTRAVENOUS
Status: CANCELLED
Start: 2018-01-24

## 2018-01-03 RX ORDER — EPINEPHRINE 0.3 MG/.3ML
0.3 INJECTION SUBCUTANEOUS EVERY 5 MIN PRN
Status: CANCELLED | OUTPATIENT
Start: 2018-02-07

## 2018-01-03 RX ORDER — SODIUM CHLORIDE 9 MG/ML
1000 INJECTION, SOLUTION INTRAVENOUS CONTINUOUS PRN
Status: CANCELLED
Start: 2018-01-17

## 2018-01-03 RX ORDER — LORAZEPAM 2 MG/ML
0.5 INJECTION INTRAMUSCULAR EVERY 4 HOURS PRN
Status: CANCELLED
Start: 2018-02-07

## 2018-01-03 RX ORDER — DIPHENHYDRAMINE HCL 25 MG
50 CAPSULE ORAL ONCE
Status: CANCELLED | OUTPATIENT
Start: 2018-01-17

## 2018-01-03 RX ORDER — EPINEPHRINE 0.3 MG/.3ML
0.3 INJECTION SUBCUTANEOUS EVERY 5 MIN PRN
Status: CANCELLED | OUTPATIENT
Start: 2018-01-17

## 2018-01-03 RX ORDER — LORAZEPAM 2 MG/ML
0.5 INJECTION INTRAMUSCULAR EVERY 4 HOURS PRN
Status: CANCELLED
Start: 2018-01-17

## 2018-01-03 RX ORDER — OXYCODONE HYDROCHLORIDE 15 MG/1
15 TABLET ORAL EVERY 8 HOURS PRN
Qty: 60 TABLET | Refills: 0 | Status: SHIPPED | OUTPATIENT
Start: 2018-01-03 | End: 2018-01-31

## 2018-01-03 RX ORDER — ALBUTEROL SULFATE 0.83 MG/ML
2.5 SOLUTION RESPIRATORY (INHALATION)
Status: CANCELLED | OUTPATIENT
Start: 2018-01-17

## 2018-01-03 RX ORDER — METHYLPREDNISOLONE SODIUM SUCCINATE 125 MG/2ML
125 INJECTION, POWDER, LYOPHILIZED, FOR SOLUTION INTRAMUSCULAR; INTRAVENOUS
Status: CANCELLED
Start: 2018-01-24

## 2018-01-03 RX ORDER — LORAZEPAM 2 MG/ML
0.5 INJECTION INTRAMUSCULAR EVERY 4 HOURS PRN
Status: CANCELLED
Start: 2018-01-24

## 2018-01-03 RX ORDER — METHYLPREDNISOLONE SODIUM SUCCINATE 125 MG/2ML
125 INJECTION, POWDER, LYOPHILIZED, FOR SOLUTION INTRAMUSCULAR; INTRAVENOUS
Status: CANCELLED
Start: 2018-02-07

## 2018-01-03 RX ORDER — ALBUTEROL SULFATE 90 UG/1
1-2 AEROSOL, METERED RESPIRATORY (INHALATION)
Status: CANCELLED
Start: 2018-01-31

## 2018-01-03 RX ORDER — SODIUM CHLORIDE 9 MG/ML
1000 INJECTION, SOLUTION INTRAVENOUS CONTINUOUS PRN
Status: CANCELLED
Start: 2018-02-07

## 2018-01-03 RX ORDER — ALBUTEROL SULFATE 90 UG/1
1-2 AEROSOL, METERED RESPIRATORY (INHALATION)
Status: CANCELLED
Start: 2018-01-24

## 2018-01-03 RX ADMIN — SODIUM CHLORIDE, PRESERVATIVE FREE 500 UNITS: 5 INJECTION INTRAVENOUS at 15:03

## 2018-01-03 RX ADMIN — BORTEZOMIB 2.2 MG: 3.5 INJECTION, POWDER, LYOPHILIZED, FOR SOLUTION INTRAVENOUS; SUBCUTANEOUS at 14:59

## 2018-01-03 ASSESSMENT — PAIN SCALES - GENERAL: PAINLEVEL: NO PAIN (0)

## 2018-01-03 NOTE — PATIENT INSTRUCTIONS
Continue chemotherapy.  Scheduled - Sarah  Follow up in about 1 month.   Scheduled - Sarah    AVS given to patient - Sarah

## 2018-01-03 NOTE — MR AVS SNAPSHOT
After Visit Summary   1/3/2018    Amira Arreola    MRN: [id number]           Patient Information     Date Of Birth          7/17/1932        Visit Information        Provider Department      1/3/2018 2:00 PM Shayne Roberts MD [facility]        Today's Diagnoses     Multiple myeloma not having achieved remission (H)    -  1      Care Instructions    Continue chemotherapy.  Scheduled - Sarah  Follow up in about 1 month.   Scheduled - Sarah    AVS given to patient - Sarah          Follow-ups after your visit        Your next 10 appointments already scheduled     Eldon 10, 2018 10:00 AM CST   Level 2 with SH INFUSION CHAIR 4   [facility] ([facility])    [facility]  [address]  [place] [address]   [phone]            Jan 17, 2018 10:00 AM CST   Level 5 with SH INFUSION CHAIR 18   [facility] ([facility])    [facility]  [address]  [place] [address]   [phone]            Jan 24, 2018 10:00 AM CST   Level 2 with SH INFUSION CHAIR 4   [facility] ([facility])    [facility]  [address]  [place] [address]   [phone]            Jan 31, 2018 10:00 AM CST   Level 2 with SH INFUSION CHAIR 7   [facility] ([facility])    [facility]  [address]  [place] [address]   [phone]            Jan 31, 2018 10:30 AM CST   Return Visit with Shayne Roberts MD   [facility] ([facility])    [facility]  [address]  [place] [address]   [phone]              Who to contact     If you have questions or need follow up information about today's clinic visit or your schedule please contact [facility]  "CANCER CLINIC directly at [phone].  Normal or non-critical lab and imaging results will be communicated to you by MyChart, letter or phone within 4 business days after the clinic has received the results. If you do not hear from us within 7 days, please contact the clinic through [email or web address] or phone. If you have a critical or abnormal lab result, we will notify you by phone as soon as possible.  Submit refill requests through [employer] or call your pharmacy and they will forward the refill request to us. Please allow 3 business days for your refill to be completed.          Additional Information About Your Visit        [employer] Information     [employer] lets you send messages to your doctor, view your test results, renew your prescriptions, schedule appointments and more. To sign up, go to [email or web address] . Click on \"Log in\" on the left side of the screen, which will take you to the Welcome page. Then click on \"Sign up Now\" on the right side of the page.     You will be asked to enter the access code listed below, as well as some personal information. Please follow the directions to create your username and password.     Your access code is: [email or web address]  Expires: 4/3/2018  3:04 PM     Your access code will  in 90 days. If you need help or a new code, please call your [place] clinic or [phone].        Care EveryWhere ID     This is your Care EveryWhere ID. This could be used by other organizations to access your [place] medical records  [id number]        Your Vitals Were     Temperature Respirations Height Pulse Oximetry BMI (Body Mass Index)       98.1  F (36.7  C) (Oral) 18 1.676 m (5' 5.98\") 97% 23.42 kg/m2        Blood Pressure from Last 3 Encounters:   18 136/81   18 136/81   17 (!) 152/92    Weight from Last 3 Encounters:   18 65.8 kg (145 lb)   18 65.8 kg (145 lb)   17 62.7 kg (138 lb 3.2 oz)              Today, you had the following     No orders found " for display         Where to get your medicines      Some of these will need a paper prescription and others can be bought over the counter.  Ask your nurse if you have questions.     Bring a paper prescription for each of these medications     oxyCODONE IR 15 MG tablet          Primary Care Provider Office Phone # Fax #    Addy Frias [phone] [address] [facility] [address]  [facility] [address]        Equal Access to Services     SARA ZELAYA : Hadii aad ku hadasho Soomaali, waaxda luqadaha, qaybta kaalmada adeegyada, waxay idiin hayaan adeeg issacsh laguera . So [facility] [phone].    ATENCIÓN: Si berela espmisa, tiene a barlow disposición servicios gratuitos de asistencia lingüística. Kadie al [phone].    We comply with applicable federal civil rights laws and [place] laws. We do not discriminate on the basis of race, color, national origin, age, disability, sex, sexual orientation, or gender identity.            Thank you!     Thank you for choosing [facility]  for your care. Our goal is always to provide you with excellent care. Hearing back from our patients is one way we can continue to improve our services. Please take a few minutes to complete the written survey that you may receive in the mail after your visit with us. Thank you!             Your Updated Medication List - Protect others around you: Learn how to safely use, store and throw away your medicines at www.disposemymeds.org.          This list is accurate as of: 1/3/18 11:59 PM.  Always use your most recent med list.                   Brand Name Dispense Instructions for use Diagnosis    acyclovir 400 MG tablet    ZOVIRAX    60 tablet    Take 1 tablet (400 mg) by mouth 2 times daily Viral Prophylaxis.    Multiple myeloma not having achieved remission (H)       CALCIUM CITRATE + PO      Take 2,000 mg by mouth daily 2 tabs        carboxymethylcellulose 0.5 % Soln ophthalmic solution    REFRESH PLUS     1 drop 4  times daily        CLARINEX PO      Take by mouth daily Taking claritin        COMPAZINE PO      Take 10 mg by mouth daily as needed        cycloSPORINE 0.05 % ophthalmic emulsion    RESTASIS     Place 1 drop into both eyes every 12 hours        DAILY MULTIVITAMIN PO      Take 1 tablet by mouth daily.    Routine general medical examination at a health care facility       dexamethasone 4 MG tablet    DECADRON    28 tablet    Take 20mg (5 tablets) by mouth every week on the morning of velcade injection.    Multiple myeloma not having achieved remission (H)       erythromycin ophthalmic ointment    ROMYCIN     Place 1 Application into both eyes At Bedtime        GENTLE STOOL SOFTENER PO      Take 100 mg by mouth daily        lidocaine-prilocaine cream    EMLA    30 g    Apply topically as needed for moderate pain Apply dollop size amount to port site 30-60 min prior to accessing    Multiple myeloma not having achieved remission (H)       LORazepam 0.5 MG tablet    ATIVAN    30 tablet    Take 1 tablet (0.5 mg) by mouth every 8 hours as needed for anxiety    Multiple myeloma not having achieved remission (H)       metoprolol 50 MG 24 hr tablet    TOPROL XL    270 tablet    Take 2 tablets (100mg) in the morning and 1 tablet (50mg) in the evening by mouth daily    Paroxysmal atrial fibrillation (H)       oxyCODONE IR 15 MG tablet    ROXICODONE    60 tablet    Take 1 tablet (15 mg) by mouth every 8 hours as needed for pain maximum 4 tablet(s) per day    Multiple myeloma not having achieved remission (H)       polyethylene glycol powder    MIRALAX/GLYCOLAX     Take 1 capful by mouth daily as needed    Bilateral leg edema       TYLENOL PO      Take 500 mg by mouth every 6 hours as needed for mild pain or fever        UNABLE TO FIND      MEDICATION NAME: Fresh Coat eye drops        VITAMIN D3 PO      Take 1,000 Units by mouth daily        warfarin 4 MG tablet    COUMADIN    110 tablet    TAKE ONE AND ONE-HALF TABLETS BY MOUTH  ON MONDAY, WEDNESDAY, AND FRIDAY AND ONE TABLET THE OTHER DAYS OF THE WEEK    Paroxysmal atrial fibrillation (H), Long-term (current) use of anticoagulants       ZOMETA IV      Inject into the vein every 30 days Every 3 month dosing

## 2018-01-03 NOTE — PROGRESS NOTES
Infusion Nursing Note:  Amira IVY Arreola presents today for C8D15 Velcade.    Patient seen by provider today: Yes: Dr. Roberts   present during visit today: Not Applicable.    Note: N/A.    Intravenous Access:  Labs drawn without difficulty.  Implanted Port.    Treatment Conditions:  Lab Results   Component Value Date    HGB 11.8 01/03/2018     Lab Results   Component Value Date    WBC 7.3 01/03/2018      Lab Results   Component Value Date    ANEU 6.6 01/03/2018     Lab Results   Component Value Date     01/03/2018      Results reviewed, labs MET treatment parameters, ok to proceed with treatment.        Post Infusion Assessment:  Patient tolerated injection without incident.  Site patent and intact, free from redness, edema or discomfort.  No evidence of extravasations.  Access discontinued per protocol.    Discharge Plan:   Discharge instructions reviewed with: Patient.  Patient and/or family verbalized understanding of discharge instructions and all questions answered.  Copy of AVS reviewed with patient and/or family.  Patient will return 1/10/18 for next appointment.  Patient discharged in stable condition accompanied by: son.  Departure Mode: Ambulatory.    David Kearney RN

## 2018-01-03 NOTE — PROGRESS NOTES
"Oncology Rooming Note    January 3, 2018 2:05 PM   Amira Arreola is a 85 year old female who presents for:    Chief Complaint   Patient presents with     Oncology Clinic Visit     Multiple myeloma not having achieved remission      Initial Vitals: /81  Temp 98.1  F (36.7  C) (Oral)  Resp 18  Ht 1.676 m (5' 5.98\")  Wt 65.8 kg (145 lb)  SpO2 97%  BMI 23.42 kg/m2 Estimated body mass index is 23.42 kg/(m^2) as calculated from the following:    Height as of this encounter: 1.676 m (5' 5.98\").    Weight as of this encounter: 65.8 kg (145 lb). Body surface area is 1.75 meters squared.  No Pain (0) Comment: Data Unavailable   No LMP recorded. Patient is postmenopausal.  Allergies reviewed: Yes  Medications reviewed: Yes    Medications: MEDICATION REFILLS NEEDED TODAY. Provider was notified.  MEDICATION REFILL NEEDED ON OXYCODONE AND ATIVAN.   Pharmacy name entered into EPIC: PATIENT WOULD LIKE A PAPER PRESCRIPTION.    Clinical concerns: None              4 minutes for nursing intake (face to face time)     Zora Bentley MA            "

## 2018-01-03 NOTE — LETTER
"    1/3/2018         RE: Amira Arreola  [address]  [place] [facility] [address]        Dear Colleague,    Thank you for referring your patient, Amira Arreola, to the [facility]. Please see a copy of my visit note below.    Oncology Rooming Note    January 3, 2018 2:05 PM   Amira Arreola is a 85 year old female who presents for:    Chief Complaint   Patient presents with     Oncology Clinic Visit     Multiple myeloma not having achieved remission      Initial Vitals: /81  Temp 98.1  F (36.7  C) (Oral)  Resp 18  Ht 1.676 m (5' 5.98\")  Wt 65.8 kg (145 lb)  SpO2 97%  BMI 23.42 kg/m2 Estimated body mass index is 23.42 kg/(m^2) as calculated from the following:    Height as of this encounter: 1.676 m (5' 5.98\").    Weight as of this encounter: 65.8 kg (145 lb). Body surface area is 1.75 meters squared.  No Pain (0) Comment: Data Unavailable   No LMP recorded. Patient is postmenopausal.  Allergies reviewed: Yes  Medications reviewed: Yes    Medications: MEDICATION REFILLS NEEDED TODAY. Provider was notified.  MEDICATION REFILL NEEDED ON OXYCODONE AND ATIVAN.   Pharmacy name entered into EPIC: PATIENT WOULD LIKE A PAPER PRESCRIPTION.    Clinical concerns: None              4 minutes for nursing intake (face to face time)     Zora Bentley MA              SUBJECTIVE:  Ms. Amira Arreola is an 85-year-old female with Kula light chain multiple myeloma.  She is currently on Velcade, dexamethasone and daratumumab.  Her disease is responding to treatment.  Labs on 12/13/2017 reveal kappa free light chain of 2.68 and M spike of 0.1.      Overall, she is tolerating treatment well.  She has some fatigue.  She has been under stress as her  is not doing well.      The patient denies any headache.  No dizziness.  She does have back pain in both upper and lower back.  She wants a refill on her oxycodone.  No chest pain.  No difficulty breathing.  No nausea or vomiting.  Appetite is " fairly good.  No fever or chills.      PHYSICAL EXAMINATION:  Unchanged.      LABORATORY DATA:  Reviewed.      ASSESSMENT:   1.  An 85-year-old female with kappa free light chain multiple myeloma which is stable.   2.  Fatigue secondary to age, myeloma, and chemotherapy.   3.  Chronic back pain.   4.  Stress because of her 's ill health.      PLAN:   1.  I discussed with her regarding multiple myeloma.  The patient overall is tolerating treatment well.  She is not having any neuropathic symptoms.  She will continue on Velcade, dexamethasone and daratumumab.      Labs were all reviewed with her.  I explained to her that her myeloma is stable.  She was happy to know that.      2.  The patient has chronic back pain.  Prescription of oxycodone refilled.      3.  The patient is on Zometa every 3 months.  That will be continued.  She is not having any dental or jaw related complications.  No pain, swelling, infection.      4.  She had a few questions which were all answered.  I will see her back in 1 month's time.  I advised her to see a physician if she has infection, fever, chills, worsening bone pain or any other concerns.      5.  On exam of the skin there are a few ecchymoses.  The patient is on warfarin.         ITZ LOPEZ MD             D: 2018 17:27   T: 2018 03:23   MT: cony      Name:     ALBERTO PARSON   MRN:      [id number]        Account:      [id number]   :      1932           Visit Date:   2018      Document: [id number]       Again, thank you for allowing me to participate in the care of your patient.        Sincerely,        Itz Lopez MD

## 2018-01-03 NOTE — MR AVS SNAPSHOT
After Visit Summary   1/3/2018    Amira Arreola    MRN: [id number]           Patient Information     Date Of Birth          7/17/1932        Visit Information        Provider Department      1/3/2018 1:30 PM SH INFUSION CHAIR 2 [facility]        Today's Diagnoses     Multiple myeloma not having achieved remission (H)    -  1    Paroxysmal atrial fibrillation (H)        Portacath in place           Follow-ups after your visit        Your next 10 appointments already scheduled     Eldon 10, 2018 10:00 AM CST   Level 2 with SH INFUSION CHAIR 4   [facility] ([facility])    [facility]  [address]  [place] [address]   [phone]            Jan 17, 2018 10:00 AM CST   Level 5 with SH INFUSION CHAIR 18   [facility] ([facility])    [facility]  [address]  [place] [address]   [phone]            Jan 24, 2018 10:00 AM CST   Level 2 with SH INFUSION CHAIR 4   [facility] ([facility])    [facility]  [address]  [place] [address]   [phone]            Jan 31, 2018 10:00 AM CST   Level 2 with SH INFUSION CHAIR 7   [facility] ([facility])    [facility]  [address]  [place] [address]   [phone]              Who to contact     If you have questions or need follow up information about today's clinic visit or your schedule please contact [facility] directly at [phone].  Normal or non-critical lab and imaging results will be communicated to you by MyChart, letter or phone within 4 business days after the clinic has received the results. If you do not hear from us within 7 days, please contact the  "clinic through [employer] or phone. If you have a critical or abnormal lab result, we will notify you by phone as soon as possible.  Submit refill requests through [facility] or call your pharmacy and they will forward the refill request to us. Please allow 3 business days for your refill to be completed.          Additional Information About Your Visit        [employer] Information     [facility] lets you send messages to your doctor, view your test results, renew your prescriptions, schedule appointments and more. To sign up, go to [email or web address] . Click on \"Log in\" on the left side of the screen, which will take you to the Welcome page. Then click on \"Sign up Now\" on the right side of the page.     You will be asked to enter the access code listed below, as well as some personal information. Please follow the directions to create your username and password.     Your access code is: [email or web address]  Expires: 4/3/2018  3:04 PM     Your access code will  in 90 days. If you need help or a new code, please call your [place] clinic or [phone].        Care EveryWhere ID     This is your Care EveryWhere ID. This could be used by other organizations to access your [place] medical records  [id number]        Your Vitals Were     Temperature Respirations Height Pulse Oximetry BMI (Body Mass Index)       98.1  F (36.7  C) (Oral) 18 1.676 m (5' 5.98\") 97% 23.41 kg/m2        Blood Pressure from Last 3 Encounters:   18 136/81   18 136/81   17 (!) 152/92    Weight from Last 3 Encounters:   18 65.8 kg (145 lb)   18 65.8 kg (145 lb)   17 62.7 kg (138 lb 3.2 oz)              We Performed the Following     CBC with platelets differential     INR          Where to get your medicines      Some of these will need a paper prescription and others can be bought over the counter.  Ask your nurse if you have questions.     Bring a paper prescription for each of these medications     oxyCODONE IR " 15 MG tablet          Primary Care Provider Office Phone # Fax #    Addy Frias [phone] [address] [address]        Equal Access to Services     ASRA ZELAYA : Hadmicaela rob ku yvetteo Soyair, waaxda luqadaha, qaybta kaalmada adeegyada, hung lancastercesar sadler. So [facility] [phone].    ATENCIÓN: Si habla español, tiene a barlow disposición servicios gratuitos de asistencia lingüística. Llame al [phone].    We comply with applicable federal civil rights laws and [place] laws. We do not discriminate on the basis of race, color, national origin, age, disability, sex, sexual orientation, or gender identity.            Thank you!     Thank you for choosing [facility]  for your care. Our goal is always to provide you with excellent care. Hearing back from our patients is one way we can continue to improve our services. Please take a few minutes to complete the written survey that you may receive in the mail after your visit with us. Thank you!             Your Updated Medication List - Protect others around you: Learn how to safely use, store and throw away your medicines at www.disposemymeds.org.          This list is accurate as of: 1/3/18  3:15 PM.  Always use your most recent med list.                   Brand Name Dispense Instructions for use Diagnosis    acyclovir 400 MG tablet    ZOVIRAX    60 tablet    Take 1 tablet (400 mg) by mouth 2 times daily Viral Prophylaxis.    Multiple myeloma not having achieved remission (H)       CALCIUM CITRATE + PO      Take 2,000 mg by mouth daily 2 tabs        carboxymethylcellulose 0.5 % Soln ophthalmic solution    REFRESH PLUS     1 drop 4 times daily        CLARINEX PO      Take by mouth daily Taking claritin        COMPAZINE PO      Take 10 mg by mouth daily as needed        cycloSPORINE 0.05 % ophthalmic emulsion    RESTASIS     Place 1 drop into both eyes every 12  hours        DAILY MULTIVITAMIN PO      Take 1 tablet by mouth daily.    Routine general medical examination at a health care facility       dexamethasone 4 MG tablet    DECADRON    28 tablet    Take 20mg (5 tablets) by mouth every week on the morning of velcade injection.    Multiple myeloma not having achieved remission (H)       erythromycin ophthalmic ointment    ROMYCIN     Place 1 Application into both eyes At Bedtime        GENTLE STOOL SOFTENER PO      Take 100 mg by mouth daily        lidocaine-prilocaine cream    EMLA    30 g    Apply topically as needed for moderate pain Apply dollop size amount to port site 30-60 min prior to accessing    Multiple myeloma not having achieved remission (H)       LORazepam 0.5 MG tablet    ATIVAN    30 tablet    Take 1 tablet (0.5 mg) by mouth every 8 hours as needed for anxiety    Multiple myeloma not having achieved remission (H)       metoprolol 50 MG 24 hr tablet    TOPROL XL    270 tablet    Take 2 tablets (100mg) in the morning and 1 tablet (50mg) in the evening by mouth daily    Paroxysmal atrial fibrillation (H)       oxyCODONE IR 15 MG tablet    ROXICODONE    60 tablet    Take 1 tablet (15 mg) by mouth every 8 hours as needed for pain maximum 4 tablet(s) per day    Multiple myeloma not having achieved remission (H)       polyethylene glycol powder    MIRALAX/GLYCOLAX     Take 1 capful by mouth daily as needed    Bilateral leg edema       TYLENOL PO      Take 500 mg by mouth every 6 hours as needed for mild pain or fever        UNABLE TO FIND      MEDICATION NAME: Fresh Coat eye drops        VITAMIN D3 PO      Take 1,000 Units by mouth daily        warfarin 4 MG tablet    COUMADIN    110 tablet    TAKE ONE AND ONE-HALF TABLETS BY MOUTH ON MONDAY, WEDNESDAY, AND FRIDAY AND ONE TABLET THE OTHER DAYS OF THE WEEK    Paroxysmal atrial fibrillation (H), Long-term (current) use of anticoagulants       ZOMETA IV      Inject into the vein every 30 days Every 3 month dosing

## 2018-01-03 NOTE — MR AVS SNAPSHOT
Amira IVY Arreola   1/3/2018   Anticoagulation Therapy Visit    Description:  85 year old female   Provider:  Addy Frias MD   Department:  [facility]           INR as of 1/3/2018     Today's INR 2.48      Anticoagulation Summary as of 1/3/2018     INR goal 2.0-3.0   Today's INR 2.48   Full instructions 6 mg on Mon, Wed, Fri; 4 mg all other days   Next INR check 1/17/2018    Indications   Long-term (current) use of anticoagulants [Z79.01] [Z79.01]  Atrial fibrillation (H) [I48.91] (Resolved) [I48.91]         January 2018 Details    Sun Mon Tue Wed Thu Fri Sat      1               2               3      6 mg   See details      4      4 mg         5      6 mg         6      4 mg           7      4 mg         8      6 mg         9      4 mg         10      6 mg         11      4 mg         12      6 mg         13      4 mg           14      4 mg         15      6 mg         16      4 mg         17            18               19               20                 21               22               23               24               25               26               27                 28               29               30               31                   Date Details   01/03 This INR check       Date of next INR:  1/17/2018         How to take your warfarin dose     To take:  4 mg Take 1 of the 4 mg tablets.    To take:  6 mg Take 1.5 of the 4 mg tablets.

## 2018-01-03 NOTE — PROGRESS NOTES
ANTICOAGULATION FOLLOW-UP CLINIC VISIT    Patient Name:  Amira Arreola  Date:  1/3/2018  Contact Type:  Telephone    SUBJECTIVE:     Patient Findings     Positives No Problem Findings           OBJECTIVE    INR   Date Value Ref Range Status   01/03/2018 2.48 (H) 0.86 - 1.14 Final       ASSESSMENT / PLAN  INR assessment THER    Recheck INR In: 2 WEEKS    INR Location Clinic      Anticoagulation Summary as of 1/3/2018     INR goal 2.0-3.0   Today's INR 2.48   Maintenance plan 6 mg (4 mg x 1.5) on Mon, Wed, Fri; 4 mg (4 mg x 1) all other days   Full instructions 6 mg on Mon, Wed, Fri; 4 mg all other days   Weekly total 34 mg   Plan last modified Cintia Powers RN (11/29/2017)   Next INR check 1/17/2018   Target end date Indefinite    Indications   Long-term (current) use of anticoagulants [Z79.01] [Z79.01]  Atrial fibrillation (H) [I48.91] (Resolved) [I48.91]         Anticoagulation Episode Summary     INR check location     Preferred lab     Send INR reminders to [facility] ANTICOAGULATION    Comments patient have standing INR order to be draw at infusion visit.  advise to call [facility] when have INR draw for dosing instruction.  patient can be reach at home phone [phone]      Anticoagulation Care Providers     Provider Role Specialty Phone number    Addy Frias MD [facility] Internal Medicine [phone]            See the Encounter Report to view Anticoagulation Flowsheet and Dosing Calendar (Go to Encounters tab in chart review, and find the Anticoagulation Therapy Visit)    Dosage adjustment made based on physician directed care plan.  Left detailed VM for patient   Asked that she callback with questions/concerns or if symptoms or anything out of the ordinary    Michelle Pinon RN

## 2018-01-04 NOTE — PROGRESS NOTES
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     1.  She had work-up for thrombocytopenia.       - On 09/21/2015, WBC of 4.2, hemoglobin of 13.2 and platelets of 138. CMP normal except mildly low protein of 6.4.   -On 09/29/2015, SPEP does not reveal any M-spike.   - On 10/02/2015, JANET does not reveal any monoclonal protein.     - On 10/22/2015, urine immunofixation reveals monoclonal free kappa light chain.    2. On 05/11/2016, kappa light chain of 50, lambda light chain of 0.32 and ratio of kappa to lambda of 156.2.  3. Skeletal survey on 05/23/2016 does not reveal any lytic lesion.    4. Bone marrow biopsy on 05/25/2016 reveals 40-50% kappa light chain restricted plasma cells.  Cytogenetics is normal. 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    5. MRI of bones on 06/21/2016 and 06/22/2016 reveals myeloma lesions.  6. On 08/24/2016, she was started on revlimid 25 mg 3 weeks on and 1 week off along with dexamethasone 20 mg weekly. Due to cytopenia, dose was subsequently reduced to 15 mg a day. Treatment in between had to be delayed because of cytopenia. She did not have any significant response to treatment.   7. Velcade and dexamethasone started on 03/21/2017.    8. On 03/21/2017, kappa free light chain was 52.5.  It decreased to 41.75 on 04/18/2017.  It  increased to 60.75 on 05/16/2017.    9. Daratumumab added on 05/31/2017.   10.  On 06/28/2017, kappa free light chain is down to 6.43.  11.  On 07/26/2017, kappa free light chain is 13.10.  12.  On 08/23/2017, kappa free light chain is 8.29.  13.  On 09/20/2017, kappa free light chain of 4.17.   14.  On 10/18/2017, kappa free light chain of 2.93.   15.  On 12/13/2017, kappa free light chain of 2.68.     SUBJECTIVE:    Mrs. Amira Arreola is an 85-year-old female with Kappa light chain  multiple myeloma.  She is currently on Velcade, dexamethasone and daratumumab.  Her disease is responding to treatment.  Labs on 12/13/2017 reveal kappa free light chain of 2.68 and M spike of 0.1.      Overall, she is tolerating treatment well.  She has some fatigue.  She has been under stress as her  is not doing well.      The patient denies any headache.  No dizziness.  She does have back pain in both upper and lower back.  She wants a refill on her oxycodone.  No chest pain.  No difficulty breathing.  No nausea or vomiting.  Appetite is fairly good.  No fever or chills.      PHYSICAL EXAMINATION:   Alert and oriented x 3.   EYES:  No icterus.   THROAT:  No ulcer or thrush.   NECK:  Supple. No lymphadenopathy.   AXILLAE:  No lymphadenopathy.   LUNGS:  Good air entry bilaterally.  No crackles or wheezing.   HEART:  Regular.  No murmur.   ABDOMEN:  Soft and nontender.  No mass.   EXTREMITIES: Bilateral pedal edema. No calf swelling or tenderness.   SKIN:  There are a few ecchymoses.  The patient is on warfarin. .       LABORATORY DATA:  Reviewed.      ASSESSMENT:   1.  An 85-year-old female with kappa free light chain multiple myeloma which is stable.   2.  Fatigue secondary to age, myeloma, and chemotherapy.   3.  Chronic back pain.   4.  Stress because of her 's ill health.      PLAN:   1.  I discussed with her regarding multiple myeloma.  Patient overall is tolerating treatment well.  She is not having any neuropathic symptoms.  She will continue on Velcade, dexamethasone and daratumumab.      Labs were all reviewed with her.  I explained to her that her myeloma is stable.  She was happy to know that.      2.  Patient has chronic back pain.  Prescription of oxycodone refilled.      3. Patient is on Zometa every 3 months.  That will be continued.  She is not having any dental or jaw related complications.  No pain, swelling or infection.      4.  She had a few questions which were all answered.  I  will see her back in 1 month's time.  I advised her to see a physician if she has infection, fever, chills, worsening bone pain or any other concerns.        ITZ LOPEZ MD             D: 2018 17:27   T: 2018 03:23   MT: cony      Name:     ALBERTO PARSON   MRN:      [id number]        Account:      [id number]   :      1932           Visit Date:   2018      Document: [id number]

## 2018-01-09 ENCOUNTER — TELEPHONE (OUTPATIENT)
Dept: ONCOLOGY | Facility: CLINIC | Age: 83
End: 2018-01-09

## 2018-01-09 NOTE — TELEPHONE ENCOUNTER
Amira called this morning stating her coughing and sneezing has persisted for about a week or more.  She occasionally coughs up some yellow/brown phlegm.   Denies any fever.  Wondering if she should not have velcade tomorrow because of this.  Dr. Roberts is ok with holding 1/10/18 velcade and resuming 1/17/18.    Amira is ok with this, will call her on Friday to see how she is feeling.  Pharmacy updated.

## 2018-01-10 ENCOUNTER — HOSPITAL ENCOUNTER (OUTPATIENT)
Facility: CLINIC | Age: 83
Setting detail: SPECIMEN
Discharge: HOME | End: 2018-01-10
Attending: INTERNAL MEDICINE | Admitting: INTERNAL MEDICINE
Payer: MEDICARE

## 2018-01-10 ENCOUNTER — ANTICOAGULATION THERAPY VISIT (OUTPATIENT)
Dept: FAMILY MEDICINE | Facility: CLINIC | Age: 83
End: 2018-01-10
Payer: COMMERCIAL

## 2018-01-10 ENCOUNTER — INFUSION THERAPY VISIT (OUTPATIENT)
Dept: INFUSION THERAPY | Facility: CLINIC | Age: 83
End: 2018-01-10
Attending: INTERNAL MEDICINE
Payer: MEDICARE

## 2018-01-10 VITALS
TEMPERATURE: 98.1 F | SYSTOLIC BLOOD PRESSURE: 148 MMHG | BODY MASS INDEX: 23.03 KG/M2 | HEART RATE: 112 BPM | RESPIRATION RATE: 16 BRPM | DIASTOLIC BLOOD PRESSURE: 84 MMHG | WEIGHT: 142.6 LBS

## 2018-01-10 DIAGNOSIS — C90.00 MULTIPLE MYELOMA NOT HAVING ACHIEVED REMISSION (H): Primary | ICD-10-CM

## 2018-01-10 DIAGNOSIS — C90.00 MULTIPLE MYELOMA NOT HAVING ACHIEVED REMISSION (H): ICD-10-CM

## 2018-01-10 DIAGNOSIS — I48.0 PAROXYSMAL ATRIAL FIBRILLATION (H): Primary | ICD-10-CM

## 2018-01-10 LAB
BASOPHILS # BLD AUTO: 0 10E9/L (ref 0–0.2)
BASOPHILS NFR BLD AUTO: 0.1 %
DIFFERENTIAL METHOD BLD: ABNORMAL
EOSINOPHIL # BLD AUTO: 0.1 10E9/L (ref 0–0.7)
EOSINOPHIL NFR BLD AUTO: 0.7 %
ERYTHROCYTE [DISTWIDTH] IN BLOOD BY AUTOMATED COUNT: 13.6 % (ref 10–15)
HCT VFR BLD AUTO: 35.9 % (ref 35–47)
HGB BLD-MCNC: 12.1 G/DL (ref 11.7–15.7)
IMM GRANULOCYTES # BLD: 0.1 10E9/L (ref 0–0.4)
IMM GRANULOCYTES NFR BLD: 0.7 %
INR PPP: 2.53 (ref 0.86–1.14)
LYMPHOCYTES # BLD AUTO: 0.5 10E9/L (ref 0.8–5.3)
LYMPHOCYTES NFR BLD AUTO: 5.2 %
MCH RBC QN AUTO: 33.9 PG (ref 26.5–33)
MCHC RBC AUTO-ENTMCNC: 33.7 G/DL (ref 31.5–36.5)
MCV RBC AUTO: 101 FL (ref 78–100)
MONOCYTES # BLD AUTO: 0.5 10E9/L (ref 0–1.3)
MONOCYTES NFR BLD AUTO: 5.2 %
NEUTROPHILS # BLD AUTO: 8.9 10E9/L (ref 1.6–8.3)
NEUTROPHILS NFR BLD AUTO: 88.1 %
NRBC # BLD AUTO: 0 10*3/UL
NRBC BLD AUTO-RTO: 0 /100
PLATELET # BLD AUTO: 211 10E9/L (ref 150–450)
RBC # BLD AUTO: 3.57 10E12/L (ref 3.8–5.2)
WBC # BLD AUTO: 10.1 10E9/L (ref 4–11)

## 2018-01-10 PROCEDURE — 25000128 H RX IP 250 OP 636: Performed by: INTERNAL MEDICINE

## 2018-01-10 PROCEDURE — 85025 COMPLETE CBC W/AUTO DIFF WBC: CPT | Performed by: INTERNAL MEDICINE

## 2018-01-10 PROCEDURE — 96401 CHEMO ANTI-NEOPL SQ/IM: CPT

## 2018-01-10 PROCEDURE — 85610 PROTHROMBIN TIME: CPT | Performed by: INTERNAL MEDICINE

## 2018-01-10 RX ORDER — DEXAMETHASONE 4 MG/1
TABLET ORAL
Qty: 28 TABLET | Refills: 0 | Status: SHIPPED | OUTPATIENT
Start: 2018-01-10 | End: 2018-02-06

## 2018-01-10 RX ORDER — ALBUTEROL SULFATE 90 UG/1
1-2 AEROSOL, METERED RESPIRATORY (INHALATION)
Status: CANCELLED
Start: 2018-01-10

## 2018-01-10 RX ORDER — ALBUTEROL SULFATE 0.83 MG/ML
2.5 SOLUTION RESPIRATORY (INHALATION)
Status: CANCELLED | OUTPATIENT
Start: 2018-01-10

## 2018-01-10 RX ORDER — EPINEPHRINE 1 MG/ML
0.3 INJECTION, SOLUTION INTRAMUSCULAR; SUBCUTANEOUS EVERY 5 MIN PRN
Status: CANCELLED | OUTPATIENT
Start: 2018-01-10

## 2018-01-10 RX ORDER — DIPHENHYDRAMINE HYDROCHLORIDE 50 MG/ML
50 INJECTION INTRAMUSCULAR; INTRAVENOUS
Status: CANCELLED
Start: 2018-01-10

## 2018-01-10 RX ORDER — SODIUM CHLORIDE 9 MG/ML
1000 INJECTION, SOLUTION INTRAVENOUS CONTINUOUS PRN
Status: CANCELLED
Start: 2018-01-10

## 2018-01-10 RX ORDER — LORAZEPAM 2 MG/ML
0.5 INJECTION INTRAMUSCULAR EVERY 4 HOURS PRN
Status: CANCELLED
Start: 2018-01-10

## 2018-01-10 RX ORDER — METHYLPREDNISOLONE SODIUM SUCCINATE 125 MG/2ML
125 INJECTION, POWDER, LYOPHILIZED, FOR SOLUTION INTRAMUSCULAR; INTRAVENOUS
Status: CANCELLED
Start: 2018-01-10

## 2018-01-10 RX ORDER — MEPERIDINE HYDROCHLORIDE 25 MG/ML
25 INJECTION INTRAMUSCULAR; INTRAVENOUS; SUBCUTANEOUS EVERY 30 MIN PRN
Status: CANCELLED | OUTPATIENT
Start: 2018-01-10

## 2018-01-10 RX ORDER — HEPARIN SODIUM (PORCINE) LOCK FLUSH IV SOLN 100 UNIT/ML 100 UNIT/ML
5 SOLUTION INTRAVENOUS EVERY 8 HOURS
Status: DISCONTINUED | OUTPATIENT
Start: 2018-01-10 | End: 2018-01-10 | Stop reason: HOSPADM

## 2018-01-10 RX ORDER — EPINEPHRINE 0.3 MG/.3ML
0.3 INJECTION SUBCUTANEOUS EVERY 5 MIN PRN
Status: CANCELLED | OUTPATIENT
Start: 2018-01-10

## 2018-01-10 RX ADMIN — SODIUM CHLORIDE, PRESERVATIVE FREE 5 ML: 5 INJECTION INTRAVENOUS at 11:09

## 2018-01-10 RX ADMIN — BORTEZOMIB 2.2 MG: 3.5 INJECTION, POWDER, LYOPHILIZED, FOR SOLUTION INTRAVENOUS; SUBCUTANEOUS at 11:10

## 2018-01-10 ASSESSMENT — PAIN SCALES - GENERAL: PAINLEVEL: NO PAIN (0)

## 2018-01-10 NOTE — PROGRESS NOTES
ANTICOAGULATION FOLLOW-UP CLINIC VISIT    Patient Name:  Amira Arreola  Date:  1/10/2018  Contact Type:  Telephone    SUBJECTIVE:     Patient Findings     Positives No Problem Findings           OBJECTIVE    INR   Date Value Ref Range Status   01/10/2018 2.53 (H) 0.86 - 1.14 Final       ASSESSMENT / PLAN  INR assessment THER    Recheck INR In: 2 WEEKS    INR Location Clinic      Anticoagulation Summary as of 1/10/2018     INR goal 2.0-3.0   Today's INR 2.53   Maintenance plan 6 mg (4 mg x 1.5) on Mon, Fri; 4 mg (4 mg x 1) all other days   Full instructions 6 mg on Mon, Fri; 4 mg all other days   Weekly total 32 mg   Plan last modified Tigist Corral RN (1/10/2018)   Next INR check 1/24/2018   Target end date Indefinite    Indications   Long-term (current) use of anticoagulants [Z79.01] [Z79.01]  Atrial fibrillation (H) [I48.91] (Resolved) [I48.91]         Anticoagulation Episode Summary     INR check location     Preferred lab     Send INR reminders to [facility] ANTICOAGULATION    Comments patient have standing INR order to be draw at infusion visit.  advise to call [facility] when have INR draw for dosing instruction.  patient can be reach at home phone [phone]      Anticoagulation Care Providers     Provider Role Specialty Phone number    Addy Frias MD [facility] Internal Medicine [phone]            See the Encounter Report to view Anticoagulation Flowsheet and Dosing Calendar (Go to Encounters tab in chart review, and find the Anticoagulation Therapy Visit)    Dosage adjustment made based on physician directed care plan. INR result note 2.53.  Called patient per PCP request. It was drawn at infusion appointment.  Patient states she has been taking 6 mg MF and 4 mg ROW for quite a long time due to her low appetite and infusions.  I changed maintenance plan to reflect this dosing regimen.  Recheck INR Tigist jennings RN

## 2018-01-10 NOTE — MR AVS SNAPSHOT
Amiragino Arreola   1/10/2018   Anticoagulation Therapy Visit    Description:  85 year old female   Provider:  Addy Frias MD   Department:  [facility]           INR as of 1/10/2018     Today's INR 2.53      Anticoagulation Summary as of 1/10/2018     INR goal 2.0-3.0   Today's INR 2.53   Full instructions 6 mg on Mon, Fri; 4 mg all other days   Next INR check 1/24/2018    Indications   Long-term (current) use of anticoagulants [Z79.01] [Z79.01]  Atrial fibrillation (H) [I48.91] (Resolved) [I48.91]         January 2018 Details    Sun Mon Tue Wed Thu Fri Sat      1               2               3               4               5               6                 7               8               9               10      4 mg   See details      11      4 mg         12      6 mg         13      4 mg           14      4 mg         15      6 mg         16      4 mg         17      4 mg         18      4 mg         19      6 mg         20      4 mg           21      4 mg         22      6 mg         23      4 mg         24            25               26               27                 28               29               30               31                   Date Details   01/10 This INR check       Date of next INR:  1/24/2018         How to take your warfarin dose     To take:  4 mg Take 1 of the 4 mg tablets.    To take:  6 mg Take 1.5 of the 4 mg tablets.

## 2018-01-10 NOTE — PROGRESS NOTES
Infusion Nursing Note:  Amira Arreola presents today for Velcade C8D22.    Patient seen by provider today: No   present during visit today: Not Applicable.    Note: patient states she had the flu last week  (did not see a DrAnthony or get tested) and is still having a runny nose and very minimal coughing. Overall she is feeling better. Dr. Roberts aware- okay to proceed with treatment today.  Informed Dr. Roberts patient needs refill on Dexamethasone.     Intravenous Access:  Labs drawn without difficulty.  Implanted Port.    Treatment Conditions:  Lab Results   Component Value Date    HGB 12.1 01/10/2018     Lab Results   Component Value Date    WBC 10.1 01/10/2018      Lab Results   Component Value Date    ANEU 8.9 01/10/2018     Lab Results   Component Value Date     01/10/2018      Results reviewed, labs MET treatment parameters, ok to proceed with treatment.    Post Infusion Assessment:  Patient tolerated injection without incident.  Site patent and intact, free from redness, edema or discomfort.  No evidence of extravasations.  Access discontinued per protocol.    Discharge Plan:   Discharge instructions reviewed with: Patient.  Patient and/or family verbalized understanding of discharge instructions and all questions answered.  Copy of AVS reviewed with patient and/or family.  Patient will return 1/17/2018 for next appointment.  Patient discharged in stable condition accompanied by: self.  Departure Mode: Ambulatory.    Caitie Josue RN

## 2018-01-10 NOTE — MR AVS SNAPSHOT
After Visit Summary   1/10/2018    Amira Arreola    MRN: [id number]           Patient Information     Date Of Birth          7/17/1932        Visit Information        Provider Department      1/10/2018 9:30 AM  INFUSION CHAIR 15 [facility]        Today's Diagnoses     Paroxysmal atrial fibrillation (H)    -  1    Multiple myeloma not having achieved remission (H)           Follow-ups after your visit        Your next 10 appointments already scheduled     Jan 17, 2018 10:00 AM CST   Level 5 with SH INFUSION CHAIR 18   [facility] ([facility])    [facility]  [address]  [place] [address]   [phone]            Jan 24, 2018 10:00 AM CST   Level 2 with SH INFUSION CHAIR 4   [facility] ([facility])    [facility]  [address]  [place] [address]   [phone]            Jan 31, 2018 10:00 AM CST   Level 2 with  INFUSION CHAIR 7   [facility] ([facility])    [facility]  [address]  [place] [address]   [phone]            Jan 31, 2018 10:30 AM CST   Return Visit with Shayne Roberts MD   [facility] ([facility])    [facility]  [address]  [place] [address]   [phone]              Who to contact     If you have questions or need follow up information about today's clinic visit or your schedule please contact [facility] directly at [phone].  Normal or non-critical lab and imaging results will be communicated to you by MyChart, letter or phone within 4 business days after the clinic has received the results. If you do not hear from us within 7 days, please contact the clinic through MyChart or phone. If you  "have a critical or abnormal lab result, we will notify you by phone as soon as possible.  Submit refill requests through [employer] or call your pharmacy and they will forward the refill request to us. Please allow 3 business days for your refill to be completed.          Additional Information About Your Visit        [employer] Information     [employer] lets you send messages to your doctor, view your test results, renew your prescriptions, schedule appointments and more. To sign up, go to [email or web address] . Click on \"Log in\" on the left side of the screen, which will take you to the Welcome page. Then click on \"Sign up Now\" on the right side of the page.     You will be asked to enter the access code listed below, as well as some personal information. Please follow the directions to create your username and password.     Your access code is: [email or web address]  Expires: 4/3/2018  3:04 PM     Your access code will  in 90 days. If you need help or a new code, please call your [facility] clinic or [phone].        Care EveryWhere ID     This is your Care EveryWhere ID. This could be used by other organizations to access your [facility] medical records  [id number]        Your Vitals Were     Pulse Temperature Respirations BMI (Body Mass Index)          112 98.1  F (36.7  C) (Oral) 16 23.03 kg/m2         Blood Pressure from Last 3 Encounters:   01/10/18 148/84   18 136/81   18 136/81    Weight from Last 3 Encounters:   01/10/18 64.7 kg (142 lb 9.6 oz)   18 65.8 kg (145 lb)   18 65.8 kg (145 lb)              We Performed the Following     CBC with platelets differential     INR          Today's Medication Changes          These changes are accurate as of: 1/10/18 12:36 PM.  If you have any questions, ask your nurse or doctor.               These medicines have changed or have updated prescriptions.        Dose/Directions    * dexamethasone 4 MG tablet   Commonly known as:  DECADRON   This may " have changed:  Another medication with the same name was added. Make sure you understand how and when to take each.   Used for:  Multiple myeloma not having achieved remission (H)        Take 20mg (5 tablets) by mouth every week on the morning of velcade injection.   Quantity:  28 tablet   Refills:  0       * dexamethasone 4 MG tablet   Commonly known as:  DECADRON   This may have changed:  You were already taking a medication with the same name, and this prescription was added. Make sure you understand how and when to take each.   Used for:  Multiple myeloma not having achieved remission (H)   Changed by:  Azar Lares, MARILU        Take 20mg (5 tablets) by mouth every week on the morning of velcade injection.   Quantity:  28 tablet   Refills:  0       * Notice:  This list has 2 medication(s) that are the same as other medications prescribed for you. Read the directions carefully, and ask your doctor or other care provider to review them with you.         Where to get your medicines      These medications were sent to [facility] [address] [place] [address] [place] [address] [place] [address] AT [facility] [address] & [facility] [address]  [address] [facility] [address], [facility] [address]     Phone:  [phone]     dexamethasone 4 MG tablet                Primary Care Provider Office Phone # Fax #    Addy Frias [phone] [address] [address]        Equal Access to Services     SARA ZELAYA AH: Hadii aad ku hadasho Sojaylonali, waaxda luqadaha, qaybta kaalmada adesergioyada, hung sadler. So [facility] [phone].    ATENCIÓN: Si habla español, tiene a barlow disposición servicios gratuitos de asistencia lingüística. Kadie al [phone].    We comply with applicable federal civil rights laws and [place] laws. We do not discriminate on the basis of race, color, national origin, age, disability, sex, sexual orientation, or gender identity.            Thank you!     Thank you for choosing  [facility]  for your care. Our goal is always to provide you with excellent care. Hearing back from our patients is one way we can continue to improve our services. Please take a few minutes to complete the written survey that you may receive in the mail after your visit with us. Thank you!             Your Updated Medication List - Protect others around you: Learn how to safely use, store and throw away your medicines at www.disposemymeds.org.          This list is accurate as of: 1/10/18 12:36 PM.  Always use your most recent med list.                   Brand Name Dispense Instructions for use Diagnosis    acyclovir 400 MG tablet    ZOVIRAX    60 tablet    Take 1 tablet (400 mg) by mouth 2 times daily Viral Prophylaxis.    Multiple myeloma not having achieved remission (H)       CALCIUM CITRATE + PO      Take 2,000 mg by mouth daily 2 tabs        carboxymethylcellulose 0.5 % Soln ophthalmic solution    REFRESH PLUS     1 drop 4 times daily        CLARINEX PO      Take by mouth daily Taking claritin        COMPAZINE PO      Take 10 mg by mouth daily as needed        cycloSPORINE 0.05 % ophthalmic emulsion    RESTASIS     Place 1 drop into both eyes every 12 hours        DAILY MULTIVITAMIN PO      Take 1 tablet by mouth daily.    Routine general medical examination at a health care facility       * dexamethasone 4 MG tablet    DECADRON    28 tablet    Take 20mg (5 tablets) by mouth every week on the morning of velcade injection.    Multiple myeloma not having achieved remission (H)       * dexamethasone 4 MG tablet    DECADRON    28 tablet    Take 20mg (5 tablets) by mouth every week on the morning of velcade injection.    Multiple myeloma not having achieved remission (H)       erythromycin ophthalmic ointment    ROMYCIN     Place 1 Application into both eyes At Bedtime        GENTLE STOOL SOFTENER PO      Take 100 mg by mouth daily        lidocaine-prilocaine cream    EMLA    30 g     Apply topically as needed for moderate pain Apply dollop size amount to port site 30-60 min prior to accessing    Multiple myeloma not having achieved remission (H)       LORazepam 0.5 MG tablet    ATIVAN    30 tablet    Take 1 tablet (0.5 mg) by mouth every 8 hours as needed for anxiety    Multiple myeloma not having achieved remission (H)       metoprolol 50 MG 24 hr tablet    TOPROL XL    270 tablet    Take 2 tablets (100mg) in the morning and 1 tablet (50mg) in the evening by mouth daily    Paroxysmal atrial fibrillation (H)       oxyCODONE IR 15 MG tablet    ROXICODONE    60 tablet    Take 1 tablet (15 mg) by mouth every 8 hours as needed for pain maximum 4 tablet(s) per day    Multiple myeloma not having achieved remission (H)       polyethylene glycol powder    MIRALAX/GLYCOLAX     Take 1 capful by mouth daily as needed    Bilateral leg edema       TYLENOL PO      Take 500 mg by mouth every 6 hours as needed for mild pain or fever        UNABLE TO FIND      MEDICATION NAME: Fresh Coat eye drops        VITAMIN D3 PO      Take 1,000 Units by mouth daily        warfarin 4 MG tablet    COUMADIN    110 tablet    TAKE ONE AND ONE-HALF TABLETS BY MOUTH ON MONDAY, WEDNESDAY, AND FRIDAY AND ONE TABLET THE OTHER DAYS OF THE WEEK    Paroxysmal atrial fibrillation (H), Long-term (current) use of anticoagulants       ZOMETA IV      Inject into the vein every 30 days Every 3 month dosing        * Notice:  This list has 2 medication(s) that are the same as other medications prescribed for you. Read the directions carefully, and ask your doctor or other care provider to review them with you.

## 2018-01-17 ENCOUNTER — INFUSION THERAPY VISIT (OUTPATIENT)
Dept: INFUSION THERAPY | Facility: CLINIC | Age: 83
End: 2018-01-17
Attending: INTERNAL MEDICINE
Payer: MEDICARE

## 2018-01-17 ENCOUNTER — HOSPITAL ENCOUNTER (OUTPATIENT)
Facility: CLINIC | Age: 83
Setting detail: SPECIMEN
Discharge: HOME | End: 2018-01-17
Attending: INTERNAL MEDICINE | Admitting: INTERNAL MEDICINE
Payer: MEDICARE

## 2018-01-17 VITALS
HEART RATE: 75 BPM | RESPIRATION RATE: 16 BRPM | DIASTOLIC BLOOD PRESSURE: 77 MMHG | WEIGHT: 142 LBS | HEIGHT: 66 IN | TEMPERATURE: 97.5 F | BODY MASS INDEX: 22.82 KG/M2 | SYSTOLIC BLOOD PRESSURE: 134 MMHG

## 2018-01-17 DIAGNOSIS — C90.00 MULTIPLE MYELOMA NOT HAVING ACHIEVED REMISSION (H): Primary | ICD-10-CM

## 2018-01-17 DIAGNOSIS — I48.0 PAROXYSMAL ATRIAL FIBRILLATION (H): ICD-10-CM

## 2018-01-17 LAB
ALBUMIN SERPL-MCNC: 3.2 G/DL (ref 3.4–5)
ALP SERPL-CCNC: 52 U/L (ref 40–150)
ALT SERPL W P-5'-P-CCNC: 24 U/L (ref 0–50)
AST SERPL W P-5'-P-CCNC: 18 U/L (ref 0–45)
BASOPHILS # BLD AUTO: 0 10E9/L (ref 0–0.2)
BASOPHILS NFR BLD AUTO: 0 %
BILIRUB DIRECT SERPL-MCNC: <0.1 MG/DL (ref 0–0.2)
BILIRUB SERPL-MCNC: 0.4 MG/DL (ref 0.2–1.3)
DIFFERENTIAL METHOD BLD: ABNORMAL
EOSINOPHIL # BLD AUTO: 0 10E9/L (ref 0–0.7)
EOSINOPHIL NFR BLD AUTO: 0 %
ERYTHROCYTE [DISTWIDTH] IN BLOOD BY AUTOMATED COUNT: 13.7 % (ref 10–15)
HCT VFR BLD AUTO: 36.5 % (ref 35–47)
HGB BLD-MCNC: 12.4 G/DL (ref 11.7–15.7)
IMM GRANULOCYTES # BLD: 0 10E9/L (ref 0–0.4)
IMM GRANULOCYTES NFR BLD: 0.3 %
INR PPP: 2.54
INR PPP: 2.54 (ref 0.86–1.14)
KAPPA LC UR-MCNC: 2.35 MG/DL (ref 0.33–1.94)
KAPPA LC/LAMBDA SER: 2.83 {RATIO} (ref 0.26–1.65)
LAMBDA LC SERPL-MCNC: 0.83 MG/DL (ref 0.57–2.63)
LYMPHOCYTES # BLD AUTO: 0.4 10E9/L (ref 0.8–5.3)
LYMPHOCYTES NFR BLD AUTO: 4.2 %
MCH RBC QN AUTO: 34 PG (ref 26.5–33)
MCHC RBC AUTO-ENTMCNC: 34 G/DL (ref 31.5–36.5)
MCV RBC AUTO: 100 FL (ref 78–100)
MONOCYTES # BLD AUTO: 0.1 10E9/L (ref 0–1.3)
MONOCYTES NFR BLD AUTO: 0.8 %
NEUTROPHILS # BLD AUTO: 9.7 10E9/L (ref 1.6–8.3)
NEUTROPHILS NFR BLD AUTO: 94.7 %
NRBC # BLD AUTO: 0 10*3/UL
NRBC BLD AUTO-RTO: 0 /100
PLATELET # BLD AUTO: 199 10E9/L (ref 150–450)
PROT SERPL-MCNC: 6 G/DL (ref 6.8–8.8)
RBC # BLD AUTO: 3.65 10E12/L (ref 3.8–5.2)
WBC # BLD AUTO: 10.2 10E9/L (ref 4–11)

## 2018-01-17 PROCEDURE — 96413 CHEMO IV INFUSION 1 HR: CPT

## 2018-01-17 PROCEDURE — 96375 TX/PRO/DX INJ NEW DRUG ADDON: CPT

## 2018-01-17 PROCEDURE — 84165 PROTEIN E-PHORESIS SERUM: CPT | Performed by: INTERNAL MEDICINE

## 2018-01-17 PROCEDURE — 25000128 H RX IP 250 OP 636: Performed by: INTERNAL MEDICINE

## 2018-01-17 PROCEDURE — 96415 CHEMO IV INFUSION ADDL HR: CPT

## 2018-01-17 PROCEDURE — 80076 HEPATIC FUNCTION PANEL: CPT | Performed by: INTERNAL MEDICINE

## 2018-01-17 PROCEDURE — A9270 NON-COVERED ITEM OR SERVICE: HCPCS | Mod: GY | Performed by: INTERNAL MEDICINE

## 2018-01-17 PROCEDURE — 25000132 ZZH RX MED GY IP 250 OP 250 PS 637: Mod: GY | Performed by: INTERNAL MEDICINE

## 2018-01-17 PROCEDURE — 96401 CHEMO ANTI-NEOPL SQ/IM: CPT

## 2018-01-17 PROCEDURE — 85610 PROTHROMBIN TIME: CPT | Performed by: INTERNAL MEDICINE

## 2018-01-17 PROCEDURE — 83883 ASSAY NEPHELOMETRY NOT SPEC: CPT | Performed by: INTERNAL MEDICINE

## 2018-01-17 PROCEDURE — 00000402 ZZHCL STATISTIC TOTAL PROTEIN: Performed by: INTERNAL MEDICINE

## 2018-01-17 PROCEDURE — 85025 COMPLETE CBC W/AUTO DIFF WBC: CPT | Performed by: INTERNAL MEDICINE

## 2018-01-17 RX ORDER — HEPARIN SODIUM (PORCINE) LOCK FLUSH IV SOLN 100 UNIT/ML 100 UNIT/ML
5 SOLUTION INTRAVENOUS EVERY 8 HOURS
Status: DISCONTINUED | OUTPATIENT
Start: 2018-01-17 | End: 2018-01-17 | Stop reason: HOSPADM

## 2018-01-17 RX ORDER — ACETAMINOPHEN 325 MG/1
650 TABLET ORAL ONCE
Status: COMPLETED | OUTPATIENT
Start: 2018-01-17 | End: 2018-01-17

## 2018-01-17 RX ORDER — DIPHENHYDRAMINE HCL 25 MG
50 CAPSULE ORAL ONCE
Status: COMPLETED | OUTPATIENT
Start: 2018-01-17 | End: 2018-01-17

## 2018-01-17 RX ADMIN — DIPHENHYDRAMINE HYDROCHLORIDE 50 MG: 25 CAPSULE ORAL at 11:12

## 2018-01-17 RX ADMIN — HEPARIN 5 ML: 100 SYRINGE at 14:54

## 2018-01-17 RX ADMIN — ACETAMINOPHEN 650 MG: 325 TABLET ORAL at 11:12

## 2018-01-17 RX ADMIN — DEXAMETHASONE SODIUM PHOSPHATE 12 MG: 10 INJECTION, SOLUTION INTRAMUSCULAR; INTRAVENOUS at 11:21

## 2018-01-17 RX ADMIN — BORTEZOMIB 2.2 MG: 3.5 INJECTION, POWDER, LYOPHILIZED, FOR SOLUTION INTRAVENOUS; SUBCUTANEOUS at 14:04

## 2018-01-17 RX ADMIN — DARATUMUMAB 1000 MG: 100 INJECTION, SOLUTION, CONCENTRATE INTRAVENOUS at 11:36

## 2018-01-17 RX ADMIN — SODIUM CHLORIDE 250 ML: 9 INJECTION, SOLUTION INTRAVENOUS at 11:13

## 2018-01-17 NOTE — PROGRESS NOTES
Infusion Nursing Note:  Amira Arreola presents today for C9D1 Darzalex/Velcade.    Patient seen by provider today: No   present during visit today: Not Applicable.    Note: N/A.    Intravenous Access:  Labs drawn without difficulty.  Implanted Port.    Treatment Conditions:  Lab Results   Component Value Date    HGB 12.4 01/17/2018     Lab Results   Component Value Date    WBC 10.2 01/17/2018      Lab Results   Component Value Date    ANEU 9.7 01/17/2018     Lab Results   Component Value Date     01/17/2018      Lab Results   Component Value Date     06/13/2017                   Lab Results   Component Value Date    POTASSIUM 4.0 06/13/2017           No results found for: MAG         Lab Results   Component Value Date    CR 0.59 11/08/2017                   Lab Results   Component Value Date    BRADLEY 9.4 11/08/2017                Lab Results   Component Value Date    BILITOTAL 0.4 01/17/2018           Lab Results   Component Value Date    ALBUMIN 3.2 01/17/2018                    Lab Results   Component Value Date    ALT 24 01/17/2018           Lab Results   Component Value Date    AST 18 01/17/2018     Results reviewed, labs MET treatment parameters, ok to proceed with treatment.          Post Infusion Assessment:  Patient tolerated infusion without incident.  Blood return noted pre and post infusion.  Site patent and intact, free from redness, edema or discomfort.  No evidence of extravasations.  Access discontinued per protocol.    Discharge Plan:   Discharge instructions reviewed with: Patient.  Patient and/or family verbalized understanding of discharge instructions and all questions answered.  Copy of AVS reviewed with patient and/or family.  Patient will return 1/24/18 for next appointment.  Patient discharged in stable condition accompanied by: self.  Departure Mode: Ambulatory.    David Kearney RN

## 2018-01-17 NOTE — MR AVS SNAPSHOT
After Visit Summary   1/17/2018    Amira Arreola    MRN: [id number]           Patient Information     Date Of Birth          7/17/1932        Visit Information        Provider Department      1/17/2018 10:00 AM  INFUSION CHAIR 18 [facility]        Today's Diagnoses     Multiple myeloma not having achieved remission (H)    -  1    Paroxysmal atrial fibrillation (H)           Follow-ups after your visit        Your next 10 appointments already scheduled     Jan 24, 2018 10:00 AM CST   Level 2 with  INFUSION CHAIR 4   [facility] ([facility])    [facility]  [address]  [place] [address]   [phone]            Jan 31, 2018 10:00 AM CST   Level 2 with  INFUSION CHAIR 11   [facility] ([facility])    [facility]  [address]  [place] [address]   [phone]            Jan 31, 2018 10:30 AM CST   Return Visit with Shayne Roberts MD   [facility] ([facility])    [facility]  [address]  [place] [address]   [phone]            Mar 06, 2018 10:15 AM CST   Return Visit with Ramos Rivera MD   [facility] ([facility])    [address]  [place] [address] [phone]              Who to contact     If you have questions or need follow up information about today's clinic visit or your schedule please contact [facility] directly at [phone].  Normal or non-critical lab and imaging results will be communicated to you by MyChart, letter or phone within 4 business days after the clinic has received the results. If you do not hear from us within 7 days, please contact the clinic through MyChart or phone. If you have a critical or  "abnormal lab result, we will notify you by phone as soon as possible.  Submit refill requests through [employer] or call your pharmacy and they will forward the refill request to us. Please allow 3 business days for your refill to be completed.          Additional Information About Your Visit        TWINLIN[employer] Information     [employer] lets you send messages to your doctor, view your test results, renew your prescriptions, schedule appointments and more. To sign up, go to [email or web address] . Click on \"Log in\" on the left side of the screen, which will take you to the Welcome page. Then click on \"Sign up Now\" on the right side of the page.     You will be asked to enter the access code listed below, as well as some personal information. Please follow the directions to create your username and password.     Your access code is: [email or web address]  Expires: 4/3/2018  3:04 PM     Your access code will  in 90 days. If you need help or a new code, please call your [facility] clinic or [phone].        Care EveryWhere ID     This is your Care EveryWhere ID. This could be used by other organizations to access your [facility] medical records  [id number]        Your Vitals Were     Pulse Temperature Respirations Height BMI (Body Mass Index)       75 97.5  F (36.4  C) (Oral) 16 1.676 m (5' 5.98\") 22.93 kg/m2        Blood Pressure from Last 3 Encounters:   18 134/77   01/10/18 148/84   18 136/81    Weight from Last 3 Encounters:   18 64.4 kg (142 lb)   01/10/18 64.7 kg (142 lb 9.6 oz)   18 65.8 kg (145 lb)              We Performed the Following     CBC with platelets differential     Hepatic panel     INR     Kappa and lambda light chain     Protein electrophoresis        Primary Care Provider Office Phone # Fax #    Addy Frias [phone] [address]  [place] [address]        Equal Access to Services     SARA ZELAYA AH: Hadii rhonda Randle " brenda jacqueomidkianna christianhung kay ah. So [facility] [phone].    ATENCIÓN: Si feli park, tiene a barlow disposición servicios gratuitos de asistencia lingüística. Kadie al [phone].    We comply with applicable federal civil rights laws and [place] laws. We do not discriminate on the basis of race, color, national origin, age, disability, sex, sexual orientation, or gender identity.            Thank you!     Thank you for choosing [facility]  for your care. Our goal is always to provide you with excellent care. Hearing back from our patients is one way we can continue to improve our services. Please take a few minutes to complete the written survey that you may receive in the mail after your visit with us. Thank you!             Your Updated Medication List - Protect others around you: Learn how to safely use, store and throw away your medicines at www.disposemymeds.org.          This list is accurate as of: 1/17/18  2:56 PM.  Always use your most recent med list.                   Brand Name Dispense Instructions for use Diagnosis    acyclovir 400 MG tablet    ZOVIRAX    60 tablet    Take 1 tablet (400 mg) by mouth 2 times daily Viral Prophylaxis.    Multiple myeloma not having achieved remission (H)       CALCIUM CITRATE + PO      Take 2,000 mg by mouth daily 2 tabs        carboxymethylcellulose 0.5 % Soln ophthalmic solution    REFRESH PLUS     1 drop 4 times daily        CLARINEX PO      Take by mouth daily Taking claritin        COMPAZINE PO      Take 10 mg by mouth daily as needed        cycloSPORINE 0.05 % ophthalmic emulsion    RESTASIS     Place 1 drop into both eyes every 12 hours        DAILY MULTIVITAMIN PO      Take 1 tablet by mouth daily.    Routine general medical examination at a health care facility       * dexamethasone 4 MG tablet    DECADRON    28 tablet    Take 20mg (5 tablets) by mouth every week on the morning of  velcade injection.    Multiple myeloma not having achieved remission (H)       * dexamethasone 4 MG tablet    DECADRON    28 tablet    Take 20mg (5 tablets) by mouth every week on the morning of velcade injection.    Multiple myeloma not having achieved remission (H)       erythromycin ophthalmic ointment    ROMYCIN     Place 1 Application into both eyes At Bedtime        GENTLE STOOL SOFTENER PO      Take 100 mg by mouth daily        lidocaine-prilocaine cream    EMLA    30 g    Apply topically as needed for moderate pain Apply dollop size amount to port site 30-60 min prior to accessing    Multiple myeloma not having achieved remission (H)       LORazepam 0.5 MG tablet    ATIVAN    30 tablet    Take 1 tablet (0.5 mg) by mouth every 8 hours as needed for anxiety    Multiple myeloma not having achieved remission (H)       metoprolol succinate 50 MG 24 hr tablet    TOPROL XL    270 tablet    Take 2 tablets (100mg) in the morning and 1 tablet (50mg) in the evening by mouth daily    Paroxysmal atrial fibrillation (H)       oxyCODONE IR 15 MG tablet    ROXICODONE    60 tablet    Take 1 tablet (15 mg) by mouth every 8 hours as needed for pain maximum 4 tablet(s) per day    Multiple myeloma not having achieved remission (H)       polyethylene glycol powder    MIRALAX/GLYCOLAX     Take 1 capful by mouth daily as needed    Bilateral leg edema       TYLENOL PO      Take 500 mg by mouth every 6 hours as needed for mild pain or fever        UNABLE TO FIND      MEDICATION NAME: Fresh Coat eye drops        VITAMIN D3 PO      Take 1,000 Units by mouth daily        warfarin 4 MG tablet    COUMADIN    110 tablet    TAKE ONE AND ONE-HALF TABLETS BY MOUTH ON MONDAY, WEDNESDAY, AND FRIDAY AND ONE TABLET THE OTHER DAYS OF THE WEEK    Paroxysmal atrial fibrillation (H), Long-term (current) use of anticoagulants       ZOMETA IV      Inject into the vein every 30 days Every 3 month dosing        * Notice:  This list has 2 medication(s)  that are the same as other medications prescribed for you. Read the directions carefully, and ask your doctor or other care provider to review them with you.

## 2018-01-18 ENCOUNTER — ANTICOAGULATION THERAPY VISIT (OUTPATIENT)
Dept: FAMILY MEDICINE | Facility: CLINIC | Age: 83
End: 2018-01-18
Payer: COMMERCIAL

## 2018-01-18 DIAGNOSIS — Z79.01 LONG-TERM (CURRENT) USE OF ANTICOAGULANTS: ICD-10-CM

## 2018-01-18 LAB
ALBUMIN SERPL ELPH-MCNC: 3.6 G/DL (ref 3.7–5.1)
ALPHA1 GLOB SERPL ELPH-MCNC: 0.4 G/DL (ref 0.2–0.4)
ALPHA2 GLOB SERPL ELPH-MCNC: 0.8 G/DL (ref 0.5–0.9)
B-GLOBULIN SERPL ELPH-MCNC: 0.7 G/DL (ref 0.6–1)
GAMMA GLOB SERPL ELPH-MCNC: 0.3 G/DL (ref 0.7–1.6)
M PROTEIN SERPL ELPH-MCNC: 0.1 G/DL
PROT PATTERN SERPL ELPH-IMP: ABNORMAL

## 2018-01-18 PROCEDURE — 99207 ZZC NO CHARGE NURSE ONLY: CPT | Performed by: INTERNAL MEDICINE

## 2018-01-18 NOTE — MR AVS SNAPSHOT
Amira IVY Arreola   1/18/2018   Anticoagulation Therapy Visit    Description:  85 year old female   Provider:  Addy Frias MD   Department:  [facility]           INR as of 1/18/2018     Today's INR 2.54 (1/17/2018)      Anticoagulation Summary as of 1/18/2018     INR goal 2.0-3.0   Today's INR 2.54 (1/17/2018)   Full instructions 6 mg on Mon, Fri; 4 mg all other days   Next INR check 1/31/2018    Indications   Long-term (current) use of anticoagulants [Z79.01] [Z79.01]  Atrial fibrillation (H) [I48.91] (Resolved) [I48.91]         January 2018 Details    Sun Mon Tue Wed Thu Fri Sat      1               2               3               4               5               6                 7               8               9               10               11               12               13                 14               15               16               17               18      4 mg   See details      19      6 mg         20      4 mg           21      4 mg         22      6 mg         23      4 mg         24      4 mg         25      4 mg         26      6 mg         27      4 mg           28      4 mg         29      6 mg         30      4 mg         31                Date Details   01/18 This INR check       Date of next INR:  1/31/2018         How to take your warfarin dose     To take:  4 mg Take 1 of the 4 mg tablets.    To take:  6 mg Take 1.5 of the 4 mg tablets.

## 2018-01-18 NOTE — PROGRESS NOTES
ANTICOAGULATION FOLLOW-UP CLINIC VISIT    Patient Name:  Amira Arreola  Date:  1/18/2018  Contact TypeLeft detailed message for ptLeft detailed message for pt    SUBJECTIVE:        OBJECTIVE    INR   Date Value Ref Range Status   01/17/2018 2.54 (H) 0.86 - 1.14 Final       ASSESSMENT / PLAN  No question data found.  Anticoagulation Summary as of 1/18/2018     INR goal 2.0-3.0   Today's INR 2.54 (1/17/2018)   Maintenance plan 6 mg (4 mg x 1.5) on Mon, Fri; 4 mg (4 mg x 1) all other days   Full instructions 6 mg on Mon, Fri; 4 mg all other days   Weekly total 32 mg   No change documented Bri Mcbride RN   Plan last modified Tigist Corral RN (1/10/2018)   Next INR check 1/31/2018   Target end date Indefinite    Indications   Long-term (current) use of anticoagulants [Z79.01] [Z79.01]  Atrial fibrillation (H) [I48.91] (Resolved) [I48.91]         Anticoagulation Episode Summary     INR check location     Preferred lab     Send INR reminders to [facility]    Comments patient have standing INR order to be draw at infusion visit.  advise to call [facility] when have INR draw for dosing instruction.  patient can be reach at home phone [phone]      Anticoagulation Care Providers     Provider Role Specialty Phone number    Addy Frias MD [facility] Internal [facility] [phone]            See the Encounter Report to view Anticoagulation Flowsheet and Dosing Calendar (Go to Encounters tab in chart review, and find the Anticoagulation Therapy Visit)    Dosage adjustment made based on physician directed care plan.  Patient had INR done at [facility] yesterday.   Called and left detailed message for patient today with yesterday's INR results, Warfarin dosing, and next INR date.   Asked patient to return call to confirm message was received.        Bri Mcbride, RN

## 2018-01-24 ENCOUNTER — HOSPITAL ENCOUNTER (OUTPATIENT)
Facility: CLINIC | Age: 83
Setting detail: SPECIMEN
Discharge: HOME | End: 2018-01-24
Attending: INTERNAL MEDICINE | Admitting: INTERNAL MEDICINE
Payer: MEDICARE

## 2018-01-24 ENCOUNTER — ANTICOAGULATION THERAPY VISIT (OUTPATIENT)
Dept: FAMILY MEDICINE | Facility: CLINIC | Age: 83
End: 2018-01-24
Payer: COMMERCIAL

## 2018-01-24 ENCOUNTER — INFUSION THERAPY VISIT (OUTPATIENT)
Dept: INFUSION THERAPY | Facility: CLINIC | Age: 83
End: 2018-01-24
Attending: INTERNAL MEDICINE
Payer: MEDICARE

## 2018-01-24 VITALS
DIASTOLIC BLOOD PRESSURE: 69 MMHG | HEIGHT: 66 IN | RESPIRATION RATE: 16 BRPM | HEART RATE: 108 BPM | WEIGHT: 140.4 LBS | TEMPERATURE: 98.1 F | SYSTOLIC BLOOD PRESSURE: 147 MMHG | BODY MASS INDEX: 22.56 KG/M2

## 2018-01-24 DIAGNOSIS — Z79.01 LONG-TERM (CURRENT) USE OF ANTICOAGULANTS: ICD-10-CM

## 2018-01-24 DIAGNOSIS — C90.00 MULTIPLE MYELOMA NOT HAVING ACHIEVED REMISSION (H): Primary | ICD-10-CM

## 2018-01-24 DIAGNOSIS — I48.0 PAROXYSMAL ATRIAL FIBRILLATION (H): ICD-10-CM

## 2018-01-24 LAB
BASOPHILS # BLD AUTO: 0 10E9/L (ref 0–0.2)
BASOPHILS NFR BLD AUTO: 0 %
DIFFERENTIAL METHOD BLD: ABNORMAL
EOSINOPHIL # BLD AUTO: 0 10E9/L (ref 0–0.7)
EOSINOPHIL NFR BLD AUTO: 0 %
ERYTHROCYTE [DISTWIDTH] IN BLOOD BY AUTOMATED COUNT: 13.9 % (ref 10–15)
HCT VFR BLD AUTO: 36.8 % (ref 35–47)
HGB BLD-MCNC: 12.5 G/DL (ref 11.7–15.7)
IMM GRANULOCYTES # BLD: 0 10E9/L (ref 0–0.4)
IMM GRANULOCYTES NFR BLD: 0.1 %
INR PPP: 2.93
INR PPP: 2.93 (ref 0.86–1.14)
LYMPHOCYTES # BLD AUTO: 0.4 10E9/L (ref 0.8–5.3)
LYMPHOCYTES NFR BLD AUTO: 4.7 %
MCH RBC QN AUTO: 33.8 PG (ref 26.5–33)
MCHC RBC AUTO-ENTMCNC: 34 G/DL (ref 31.5–36.5)
MCV RBC AUTO: 100 FL (ref 78–100)
MONOCYTES # BLD AUTO: 0.1 10E9/L (ref 0–1.3)
MONOCYTES NFR BLD AUTO: 1.1 %
NEUTROPHILS # BLD AUTO: 7.6 10E9/L (ref 1.6–8.3)
NEUTROPHILS NFR BLD AUTO: 94.1 %
NRBC # BLD AUTO: 0 10*3/UL
NRBC BLD AUTO-RTO: 0 /100
PLATELET # BLD AUTO: 163 10E9/L (ref 150–450)
RBC # BLD AUTO: 3.7 10E12/L (ref 3.8–5.2)
WBC # BLD AUTO: 8.1 10E9/L (ref 4–11)

## 2018-01-24 PROCEDURE — 99207 ZZC NO CHARGE NURSE ONLY: CPT | Performed by: INTERNAL MEDICINE

## 2018-01-24 PROCEDURE — 96401 CHEMO ANTI-NEOPL SQ/IM: CPT

## 2018-01-24 PROCEDURE — 85610 PROTHROMBIN TIME: CPT | Performed by: INTERNAL MEDICINE

## 2018-01-24 PROCEDURE — 25000128 H RX IP 250 OP 636: Performed by: INTERNAL MEDICINE

## 2018-01-24 PROCEDURE — 85025 COMPLETE CBC W/AUTO DIFF WBC: CPT | Performed by: INTERNAL MEDICINE

## 2018-01-24 RX ADMIN — BORTEZOMIB 2.2 MG: 3.5 INJECTION, POWDER, LYOPHILIZED, FOR SOLUTION INTRAVENOUS; SUBCUTANEOUS at 12:14

## 2018-01-24 ASSESSMENT — PAIN SCALES - GENERAL: PAINLEVEL: NO PAIN (0)

## 2018-01-24 NOTE — PROGRESS NOTES
ANTICOAGULATION FOLLOW-UP CLINIC VISIT    Patient Name:  Amira Arreola  Date:  1/24/2018  Contact Type:  Telephone/ Dose instructions given to patient    SUBJECTIVE:     Patient Findings     Positives No Problem Findings           OBJECTIVE    INR   Date Value Ref Range Status   01/24/2018 2.93 (H) 0.86 - 1.14 Final       ASSESSMENT / PLAN  INR assessment THER    Recheck INR In: 2 WEEKS    INR Location Outside lab      Anticoagulation Summary as of 1/24/2018     INR goal 2.0-3.0   Today's INR 2.93   Maintenance plan 6 mg (4 mg x 1.5) on Mon, Fri; 4 mg (4 mg x 1) all other days   Full instructions 6 mg on Mon, Fri; 4 mg all other days   Weekly total 32 mg   No change documented Cintia Powers RN   Plan last modified Tigist Corral RN (1/10/2018)   Next INR check 2/7/2018   Target end date Indefinite    Indications   Long-term (current) use of anticoagulants [Z79.01] [Z79.01]  Atrial fibrillation (H) [I48.91] (Resolved) [I48.91]         Anticoagulation Episode Summary     INR check location     Preferred lab     Send INR reminders to [facility]    Comments patient have standing INR order to be draw at infusion visit.  advise to call [facility] when have INR draw for dosing instruction.  patient can be reach at home phone [phone]      Anticoagulation Care Providers     Provider Role Specialty Phone number    Addy Frias MD [facility] Internal Medicine [phone]            See the Encounter Report to view Anticoagulation Flowsheet and Dosing Calendar (Go to Encounters tab in chart review, and find the Anticoagulation Therapy Visit)    Dosage adjustment made based on physician directed care plan.    Cintia Powers, SHELLIE

## 2018-01-24 NOTE — MR AVS SNAPSHOT
Amira IVY Arreola   1/24/2018   Anticoagulation Therapy Visit    Description:  85 year old female   Provider:  Addy Frias MD   Department:  [facility]           INR as of 1/24/2018     Today's INR 2.93      Anticoagulation Summary as of 1/24/2018     INR goal 2.0-3.0   Today's INR 2.93   Full instructions 6 mg on Mon, Fri; 4 mg all other days   Next INR check 2/7/2018    Indications   Long-term (current) use of anticoagulants [Z79.01] [Z79.01]  Atrial fibrillation (H) [I48.91] (Resolved) [I48.91]         January 2018 Details    Sun Mon Tue Wed Thu Fri Sat      1               2               3               4               5               6                 7               8               9               10               11               12               13                 14               15               16               17               18               19               20                 21               22               23               24      4 mg   See details      25      4 mg         26      6 mg         27      4 mg           28      4 mg         29      6 mg         30      4 mg         31      4 mg             Date Details   01/24 This INR check               How to take your warfarin dose     To take:  4 mg Take 1 of the 4 mg tablets.    To take:  6 mg Take 1.5 of the 4 mg tablets.           February 2018 Details    Sun Mon Tue Wed Thu Fri Sat         1      4 mg         2      6 mg         3      4 mg           4      4 mg         5      6 mg         6      4 mg         7            8               9               10                 11               12               13               14               15               16               17                 18               19               20               21               22               23               24                 25               26               27               28                   Date Details   No additional details     Date of next INR:  2/7/2018         How to take your warfarin dose     To take:  4 mg Take 1 of the 4 mg tablets.    To take:  6 mg Take 1.5 of the 4 mg tablets.

## 2018-01-24 NOTE — PROGRESS NOTES
Infusion Nursing Note:  Amira Arreola presents today for C9D8 Velcade .    Patient seen by provider today: No   present during visit today: Not Applicable.    Note: N/A.    Intravenous Access:  Labs drawn without difficulty.  Implanted Port.    Treatment Conditions:  Lab Results   Component Value Date    HGB 12.5 01/24/2018     Lab Results   Component Value Date    WBC 8.1 01/24/2018      Lab Results   Component Value Date    ANEU 7.6 01/24/2018     Lab Results   Component Value Date     01/24/2018      Results reviewed, labs MET treatment parameters, ok to proceed with treatment.      Post Infusion Assessment:  Patient tolerated injection without incident.  Site patent and intact, free from redness, edema or discomfort.  No evidence of extravasations.    Discharge Plan:   Discharge instructions reviewed with: Patient.  Patient and/or family verbalized understanding of discharge instructions and all questions answered.  Copy of AVS reviewed with patient and/or family.  Patient will return 1/31/18 for next appointment.  Patient discharged in stable condition accompanied by: self.  Departure Mode: Ambulatory.    Ladonna Boo RN

## 2018-01-24 NOTE — MR AVS SNAPSHOT
After Visit Summary   1/24/2018    Amira Arreola    MRN: [id number]           Patient Information     Date Of Birth          7/17/1932        Visit Information        Provider Department      1/24/2018 10:00 AM  INFUSION CHAIR 4 [facility]        Today's Diagnoses     Multiple myeloma not having achieved remission (H)    -  1    Paroxysmal atrial fibrillation (H)           Follow-ups after your visit        Your next 10 appointments already scheduled     Jan 31, 2018 10:00 AM CST   Level 2 with  INFUSION CHAIR 11   [facility] ([facility])    [facility] [place] [id number]  [place] [address]   [phone]            Jan 31, 2018 10:30 AM CST   Return Visit with Shayne Roberts MD   [facility] ([facility])    [facility]  [address]  [place] [address]   [phone]            Mar 06, 2018 10:15 AM CST   Return Visit with Ramos Rivera MD   [facility] ([facility]    [address]  [place] [address]   [phone]              Who to contact     If you have questions or need follow up information about today's clinic visit or your schedule please contact [facility] directly at [phone].  Normal or non-critical lab and imaging results will be communicated to you by MyChart, letter or phone within 4 business days after the clinic has received the results. If you do not hear from us within 7 days, please contact the clinic through MyChart or phone. If you have a critical or abnormal lab result, we will notify you by phone as soon as possible.  Submit refill requests through [employer] [facility] or call your pharmacy and they will forward the refill request to us. Please allow 3 business days for your refill to be completed.           "Additional Information About Your Visit        MyChart Information     [employer] lets you send messages to your doctor, view your test results, renew your prescriptions, schedule appointments and more. To sign up, go to [email or web address] . Click on \"Log in\" on the left side of the screen, which will take you to the Welcome page. Then click on \"Sign up Now\" on the right side of the page.     You will be asked to enter the access code listed below, as well as some personal information. Please follow the directions to create your username and password.     Your access code is: [email or web address]  Expires: 4/3/2018  3:04 PM     Your access code will  in 90 days. If you need help or a new code, please call your [facility] clinic or [phone].        Care EveryWhere ID     This is your [facility] EveryWhere ID. This could be used by other organizations to access your [facility] medical records  [id number]        Your Vitals Were     Pulse Temperature Respirations Height BMI (Body Mass Index)       108 98.1  F (36.7  C) (Oral) 16 1.676 m (5' 5.98\") 22.67 kg/m2        Blood Pressure from Last 3 Encounters:   18 147/69   18 134/77   01/10/18 148/84    Weight from Last 3 Encounters:   18 63.7 kg (140 lb 6.4 oz)   18 64.4 kg (142 lb)   01/10/18 64.7 kg (142 lb 9.6 oz)              We Performed the Following     CBC with platelets differential     INR        Primary Care Provider Office Phone # Fax #    Addy Frias [phone] [address]  [place] [address]        Equal Access to Services     HARINI ZELAYA : Gissel Galloway, rhonda gutierrez, dahlia kaalmada alonso, hung sadler. So [facility] [place] [phone].    ATENCIÓN: Si habla español, tiene a barlow disposición servicios gratuitos de asistencia lingüística. Lllisa al [phone].    We comply with applicable federal civil rights laws and [place] laws. We do not discriminate " on the basis of race, color, national origin, age, disability, sex, sexual orientation, or gender identity.            Thank you!     Thank you for choosing [facility] AND [facility]  for your care. Our goal is always to provide you with excellent care. Hearing back from our patients is one way we can continue to improve our services. Please take a few minutes to complete the written survey that you may receive in the mail after your visit with us. Thank you!             Your Updated Medication List - Protect others around you: Learn how to safely use, store and throw away your medicines at www.disposemymeds.org.          This list is accurate as of 1/24/18  1:25 PM.  Always use your most recent med list.                   Brand Name Dispense Instructions for use Diagnosis    acyclovir 400 MG tablet    ZOVIRAX    60 tablet    Take 1 tablet (400 mg) by mouth 2 times daily Viral Prophylaxis.    Multiple myeloma not having achieved remission (H)       CALCIUM CITRATE + PO      Take 2,000 mg by mouth daily 2 tabs        carboxymethylcellulose 0.5 % Soln ophthalmic solution    REFRESH PLUS     1 drop 4 times daily        CLARINEX PO      Take by mouth daily Taking claritin        COMPAZINE PO      Take 10 mg by mouth daily as needed        cycloSPORINE 0.05 % ophthalmic emulsion    RESTASIS     Place 1 drop into both eyes every 12 hours        DAILY MULTIVITAMIN PO      Take 1 tablet by mouth daily.    Routine general medical examination at a health care facility       * dexamethasone 4 MG tablet    DECADRON    28 tablet    Take 20mg (5 tablets) by mouth every week on the morning of velcade injection.    Multiple myeloma not having achieved remission (H)       * dexamethasone 4 MG tablet    DECADRON    28 tablet    Take 20mg (5 tablets) by mouth every week on the morning of velcade injection.    Multiple myeloma not having achieved remission (H)       erythromycin ophthalmic ointment    ROMYCIN      Place 1 Application into both eyes At Bedtime        GENTLE STOOL SOFTENER PO      Take 100 mg by mouth daily        lidocaine-prilocaine cream    EMLA    30 g    Apply topically as needed for moderate pain Apply dollop size amount to port site 30-60 min prior to accessing    Multiple myeloma not having achieved remission (H)       LORazepam 0.5 MG tablet    ATIVAN    30 tablet    Take 1 tablet (0.5 mg) by mouth every 8 hours as needed for anxiety    Multiple myeloma not having achieved remission (H)       metoprolol succinate 50 MG 24 hr tablet    TOPROL XL    270 tablet    Take 2 tablets (100mg) in the morning and 1 tablet (50mg) in the evening by mouth daily    Paroxysmal atrial fibrillation (H)       oxyCODONE IR 15 MG tablet    ROXICODONE    60 tablet    Take 1 tablet (15 mg) by mouth every 8 hours as needed for pain maximum 4 tablet(s) per day    Multiple myeloma not having achieved remission (H)       polyethylene glycol powder    MIRALAX/GLYCOLAX     Take 1 capful by mouth daily as needed    Bilateral leg edema       TYLENOL PO      Take 500 mg by mouth every 6 hours as needed for mild pain or fever        UNABLE TO FIND      MEDICATION NAME: Fresh Coat eye drops        VITAMIN D3 PO      Take 1,000 Units by mouth daily        warfarin 4 MG tablet    COUMADIN    110 tablet    TAKE ONE AND ONE-HALF TABLETS BY MOUTH ON MONDAY, WEDNESDAY, AND FRIDAY AND ONE TABLET THE OTHER DAYS OF THE WEEK    Paroxysmal atrial fibrillation (H), Long-term (current) use of anticoagulants       ZOMETA IV      Inject into the vein every 30 days Every 3 month dosing        * Notice:  This list has 2 medication(s) that are the same as other medications prescribed for you. Read the directions carefully, and ask your doctor or other care provider to review them with you.

## 2018-01-25 RX ORDER — BRINZOLAMIDE/BRIMONIDINE TARTRATE 10; 2 MG/ML; MG/ML
1 SUSPENSION/ DROPS OPHTHALMIC 2 TIMES DAILY
Refills: 2 | COMMUNITY
Start: 2017-12-14 | End: 2022-01-01

## 2018-01-31 ENCOUNTER — ONCOLOGY VISIT (OUTPATIENT)
Dept: ONCOLOGY | Facility: CLINIC | Age: 83
End: 2018-01-31
Attending: INTERNAL MEDICINE
Payer: MEDICARE

## 2018-01-31 ENCOUNTER — HOSPITAL ENCOUNTER (OUTPATIENT)
Facility: CLINIC | Age: 83
Setting detail: SPECIMEN
Discharge: HOME | End: 2018-01-31
Attending: INTERNAL MEDICINE | Admitting: INTERNAL MEDICINE
Payer: MEDICARE

## 2018-01-31 ENCOUNTER — ANTICOAGULATION THERAPY VISIT (OUTPATIENT)
Dept: FAMILY MEDICINE | Facility: CLINIC | Age: 83
End: 2018-01-31

## 2018-01-31 ENCOUNTER — INFUSION THERAPY VISIT (OUTPATIENT)
Dept: INFUSION THERAPY | Facility: CLINIC | Age: 83
End: 2018-01-31
Attending: INTERNAL MEDICINE
Payer: MEDICARE

## 2018-01-31 VITALS
HEART RATE: 79 BPM | WEIGHT: 142.2 LBS | HEIGHT: 66 IN | RESPIRATION RATE: 16 BRPM | TEMPERATURE: 98.1 F | DIASTOLIC BLOOD PRESSURE: 65 MMHG | OXYGEN SATURATION: 99 % | SYSTOLIC BLOOD PRESSURE: 100 MMHG | BODY MASS INDEX: 22.85 KG/M2

## 2018-01-31 VITALS
HEIGHT: 66 IN | HEART RATE: 79 BPM | OXYGEN SATURATION: 99 % | TEMPERATURE: 98.1 F | BODY MASS INDEX: 22.85 KG/M2 | WEIGHT: 142.2 LBS | RESPIRATION RATE: 16 BRPM | DIASTOLIC BLOOD PRESSURE: 65 MMHG | SYSTOLIC BLOOD PRESSURE: 100 MMHG

## 2018-01-31 DIAGNOSIS — C90.00 MULTIPLE MYELOMA NOT HAVING ACHIEVED REMISSION (H): Primary | ICD-10-CM

## 2018-01-31 DIAGNOSIS — I48.0 PAROXYSMAL ATRIAL FIBRILLATION (H): ICD-10-CM

## 2018-01-31 DIAGNOSIS — Z95.828 PORTACATH IN PLACE: ICD-10-CM

## 2018-01-31 LAB
BASOPHILS # BLD AUTO: 0 10E9/L (ref 0–0.2)
BASOPHILS NFR BLD AUTO: 0 %
DIFFERENTIAL METHOD BLD: ABNORMAL
EOSINOPHIL # BLD AUTO: 0.1 10E9/L (ref 0–0.7)
EOSINOPHIL NFR BLD AUTO: 1.4 %
ERYTHROCYTE [DISTWIDTH] IN BLOOD BY AUTOMATED COUNT: 14.2 % (ref 10–15)
HCT VFR BLD AUTO: 36 % (ref 35–47)
HGB BLD-MCNC: 12 G/DL (ref 11.7–15.7)
IMM GRANULOCYTES # BLD: 0 10E9/L (ref 0–0.4)
IMM GRANULOCYTES NFR BLD: 0.3 %
INR PPP: 2.84 (ref 0.86–1.14)
LYMPHOCYTES # BLD AUTO: 1 10E9/L (ref 0.8–5.3)
LYMPHOCYTES NFR BLD AUTO: 15.8 %
MCH RBC QN AUTO: 33.4 PG (ref 26.5–33)
MCHC RBC AUTO-ENTMCNC: 33.3 G/DL (ref 31.5–36.5)
MCV RBC AUTO: 100 FL (ref 78–100)
MONOCYTES # BLD AUTO: 1 10E9/L (ref 0–1.3)
MONOCYTES NFR BLD AUTO: 14.8 %
NEUTROPHILS # BLD AUTO: 4.5 10E9/L (ref 1.6–8.3)
NEUTROPHILS NFR BLD AUTO: 67.7 %
NRBC # BLD AUTO: 0 10*3/UL
NRBC BLD AUTO-RTO: 0 /100
PLATELET # BLD AUTO: 167 10E9/L (ref 150–450)
RBC # BLD AUTO: 3.59 10E12/L (ref 3.8–5.2)
WBC # BLD AUTO: 6.6 10E9/L (ref 4–11)

## 2018-01-31 PROCEDURE — 25000128 H RX IP 250 OP 636: Performed by: INTERNAL MEDICINE

## 2018-01-31 PROCEDURE — G0463 HOSPITAL OUTPT CLINIC VISIT: HCPCS

## 2018-01-31 PROCEDURE — 96401 CHEMO ANTI-NEOPL SQ/IM: CPT

## 2018-01-31 PROCEDURE — 85025 COMPLETE CBC W/AUTO DIFF WBC: CPT | Performed by: INTERNAL MEDICINE

## 2018-01-31 PROCEDURE — 85610 PROTHROMBIN TIME: CPT | Performed by: INTERNAL MEDICINE

## 2018-01-31 PROCEDURE — 99213 OFFICE O/P EST LOW 20 MIN: CPT | Performed by: INTERNAL MEDICINE

## 2018-01-31 RX ORDER — METHYLPREDNISOLONE SODIUM SUCCINATE 125 MG/2ML
125 INJECTION, POWDER, LYOPHILIZED, FOR SOLUTION INTRAMUSCULAR; INTRAVENOUS
Status: CANCELLED
Start: 2018-03-07

## 2018-01-31 RX ORDER — HEPARIN SODIUM (PORCINE) LOCK FLUSH IV SOLN 100 UNIT/ML 100 UNIT/ML
5 SOLUTION INTRAVENOUS EVERY 8 HOURS
Status: CANCELLED
Start: 2018-02-14

## 2018-01-31 RX ORDER — MEPERIDINE HYDROCHLORIDE 25 MG/ML
25 INJECTION INTRAMUSCULAR; INTRAVENOUS; SUBCUTANEOUS EVERY 30 MIN PRN
Status: CANCELLED | OUTPATIENT
Start: 2018-03-07

## 2018-01-31 RX ORDER — HEPARIN SODIUM (PORCINE) LOCK FLUSH IV SOLN 100 UNIT/ML 100 UNIT/ML
5 SOLUTION INTRAVENOUS EVERY 8 HOURS
Status: CANCELLED
Start: 2018-02-21

## 2018-01-31 RX ORDER — METHYLPREDNISOLONE SODIUM SUCCINATE 125 MG/2ML
125 INJECTION, POWDER, LYOPHILIZED, FOR SOLUTION INTRAMUSCULAR; INTRAVENOUS
Status: CANCELLED
Start: 2018-02-21

## 2018-01-31 RX ORDER — LORAZEPAM 0.5 MG/1
0.5 TABLET ORAL EVERY 8 HOURS PRN
Qty: 30 TABLET | Refills: 3 | Status: SHIPPED | OUTPATIENT
Start: 2018-01-31 | End: 2018-03-28

## 2018-01-31 RX ORDER — ALBUTEROL SULFATE 0.83 MG/ML
2.5 SOLUTION RESPIRATORY (INHALATION)
Status: CANCELLED | OUTPATIENT
Start: 2018-02-14

## 2018-01-31 RX ORDER — METHYLPREDNISOLONE SODIUM SUCCINATE 125 MG/2ML
125 INJECTION, POWDER, LYOPHILIZED, FOR SOLUTION INTRAMUSCULAR; INTRAVENOUS
Status: CANCELLED
Start: 2018-02-14

## 2018-01-31 RX ORDER — LORAZEPAM 2 MG/ML
0.5 INJECTION INTRAMUSCULAR EVERY 4 HOURS PRN
Status: CANCELLED
Start: 2018-02-14

## 2018-01-31 RX ORDER — ALBUTEROL SULFATE 90 UG/1
1-2 AEROSOL, METERED RESPIRATORY (INHALATION)
Status: CANCELLED
Start: 2018-02-21

## 2018-01-31 RX ORDER — MEPERIDINE HYDROCHLORIDE 25 MG/ML
25 INJECTION INTRAMUSCULAR; INTRAVENOUS; SUBCUTANEOUS EVERY 30 MIN PRN
Status: CANCELLED | OUTPATIENT
Start: 2018-02-14

## 2018-01-31 RX ORDER — DIPHENHYDRAMINE HYDROCHLORIDE 50 MG/ML
50 INJECTION INTRAMUSCULAR; INTRAVENOUS
Status: CANCELLED
Start: 2018-02-14

## 2018-01-31 RX ORDER — DIPHENHYDRAMINE HCL 25 MG
50 CAPSULE ORAL ONCE
Status: CANCELLED | OUTPATIENT
Start: 2018-02-14

## 2018-01-31 RX ORDER — OXYCODONE HYDROCHLORIDE 15 MG/1
15 TABLET ORAL EVERY 8 HOURS PRN
Qty: 60 TABLET | Refills: 0 | Status: SHIPPED | OUTPATIENT
Start: 2018-01-31 | End: 2018-02-28

## 2018-01-31 RX ORDER — ALBUTEROL SULFATE 90 UG/1
1-2 AEROSOL, METERED RESPIRATORY (INHALATION)
Status: CANCELLED
Start: 2018-02-14

## 2018-01-31 RX ORDER — EPINEPHRINE 0.3 MG/.3ML
0.3 INJECTION SUBCUTANEOUS EVERY 5 MIN PRN
Status: CANCELLED | OUTPATIENT
Start: 2018-02-14

## 2018-01-31 RX ORDER — HEPARIN SODIUM (PORCINE) LOCK FLUSH IV SOLN 100 UNIT/ML 100 UNIT/ML
5 SOLUTION INTRAVENOUS EVERY 8 HOURS
Status: CANCELLED
Start: 2018-03-07

## 2018-01-31 RX ORDER — EPINEPHRINE 0.3 MG/.3ML
0.3 INJECTION SUBCUTANEOUS EVERY 5 MIN PRN
Status: CANCELLED | OUTPATIENT
Start: 2018-03-07

## 2018-01-31 RX ORDER — HEPARIN SODIUM (PORCINE) LOCK FLUSH IV SOLN 100 UNIT/ML 100 UNIT/ML
5 SOLUTION INTRAVENOUS EVERY 8 HOURS
Status: CANCELLED
Start: 2018-02-28

## 2018-01-31 RX ORDER — EPINEPHRINE 1 MG/ML
0.3 INJECTION, SOLUTION INTRAMUSCULAR; SUBCUTANEOUS EVERY 5 MIN PRN
Status: CANCELLED | OUTPATIENT
Start: 2018-02-14

## 2018-01-31 RX ORDER — EPINEPHRINE 0.3 MG/.3ML
0.3 INJECTION SUBCUTANEOUS EVERY 5 MIN PRN
Status: CANCELLED | OUTPATIENT
Start: 2018-02-28

## 2018-01-31 RX ORDER — MEPERIDINE HYDROCHLORIDE 25 MG/ML
25 INJECTION INTRAMUSCULAR; INTRAVENOUS; SUBCUTANEOUS EVERY 30 MIN PRN
Status: CANCELLED | OUTPATIENT
Start: 2018-02-28

## 2018-01-31 RX ORDER — LORAZEPAM 2 MG/ML
0.5 INJECTION INTRAMUSCULAR EVERY 4 HOURS PRN
Status: CANCELLED
Start: 2018-03-07

## 2018-01-31 RX ORDER — EPINEPHRINE 1 MG/ML
0.3 INJECTION, SOLUTION INTRAMUSCULAR; SUBCUTANEOUS EVERY 5 MIN PRN
Status: CANCELLED | OUTPATIENT
Start: 2018-02-28

## 2018-01-31 RX ORDER — EPINEPHRINE 1 MG/ML
0.3 INJECTION, SOLUTION INTRAMUSCULAR; SUBCUTANEOUS EVERY 5 MIN PRN
Status: CANCELLED | OUTPATIENT
Start: 2018-02-21

## 2018-01-31 RX ORDER — SODIUM CHLORIDE 9 MG/ML
1000 INJECTION, SOLUTION INTRAVENOUS CONTINUOUS PRN
Status: CANCELLED
Start: 2018-03-07

## 2018-01-31 RX ORDER — LORAZEPAM 2 MG/ML
0.5 INJECTION INTRAMUSCULAR EVERY 4 HOURS PRN
Status: CANCELLED
Start: 2018-02-28

## 2018-01-31 RX ORDER — ALBUTEROL SULFATE 0.83 MG/ML
2.5 SOLUTION RESPIRATORY (INHALATION)
Status: CANCELLED | OUTPATIENT
Start: 2018-02-21

## 2018-01-31 RX ORDER — ALBUTEROL SULFATE 90 UG/1
1-2 AEROSOL, METERED RESPIRATORY (INHALATION)
Status: CANCELLED
Start: 2018-02-28

## 2018-01-31 RX ORDER — DIPHENHYDRAMINE HYDROCHLORIDE 50 MG/ML
50 INJECTION INTRAMUSCULAR; INTRAVENOUS
Status: CANCELLED
Start: 2018-03-07

## 2018-01-31 RX ORDER — SODIUM CHLORIDE 9 MG/ML
1000 INJECTION, SOLUTION INTRAVENOUS CONTINUOUS PRN
Status: CANCELLED
Start: 2018-02-21

## 2018-01-31 RX ORDER — EPINEPHRINE 1 MG/ML
0.3 INJECTION, SOLUTION INTRAMUSCULAR; SUBCUTANEOUS EVERY 5 MIN PRN
Status: CANCELLED | OUTPATIENT
Start: 2018-03-07

## 2018-01-31 RX ORDER — HEPARIN SODIUM (PORCINE) LOCK FLUSH IV SOLN 100 UNIT/ML 100 UNIT/ML
500 SOLUTION INTRAVENOUS EVERY 8 HOURS
Status: DISCONTINUED | OUTPATIENT
Start: 2018-01-31 | End: 2018-01-31 | Stop reason: HOSPADM

## 2018-01-31 RX ORDER — EPINEPHRINE 0.3 MG/.3ML
0.3 INJECTION SUBCUTANEOUS EVERY 5 MIN PRN
Status: CANCELLED | OUTPATIENT
Start: 2018-02-21

## 2018-01-31 RX ORDER — ACETAMINOPHEN 325 MG/1
650 TABLET ORAL ONCE
Status: CANCELLED | OUTPATIENT
Start: 2018-02-14

## 2018-01-31 RX ORDER — ALBUTEROL SULFATE 0.83 MG/ML
2.5 SOLUTION RESPIRATORY (INHALATION)
Status: CANCELLED | OUTPATIENT
Start: 2018-03-07

## 2018-01-31 RX ORDER — MEPERIDINE HYDROCHLORIDE 25 MG/ML
25 INJECTION INTRAMUSCULAR; INTRAVENOUS; SUBCUTANEOUS EVERY 30 MIN PRN
Status: CANCELLED | OUTPATIENT
Start: 2018-02-21

## 2018-01-31 RX ORDER — HEPARIN SODIUM (PORCINE) LOCK FLUSH IV SOLN 100 UNIT/ML 100 UNIT/ML
500 SOLUTION INTRAVENOUS EVERY 8 HOURS
Status: CANCELLED
Start: 2018-01-31

## 2018-01-31 RX ORDER — DIPHENHYDRAMINE HYDROCHLORIDE 50 MG/ML
50 INJECTION INTRAMUSCULAR; INTRAVENOUS
Status: CANCELLED
Start: 2018-02-28

## 2018-01-31 RX ORDER — LORAZEPAM 2 MG/ML
0.5 INJECTION INTRAMUSCULAR EVERY 4 HOURS PRN
Status: CANCELLED
Start: 2018-02-21

## 2018-01-31 RX ORDER — SODIUM CHLORIDE 9 MG/ML
1000 INJECTION, SOLUTION INTRAVENOUS CONTINUOUS PRN
Status: CANCELLED
Start: 2018-02-28

## 2018-01-31 RX ORDER — DIPHENHYDRAMINE HYDROCHLORIDE 50 MG/ML
50 INJECTION INTRAMUSCULAR; INTRAVENOUS
Status: CANCELLED
Start: 2018-02-21

## 2018-01-31 RX ORDER — METHYLPREDNISOLONE SODIUM SUCCINATE 125 MG/2ML
125 INJECTION, POWDER, LYOPHILIZED, FOR SOLUTION INTRAMUSCULAR; INTRAVENOUS
Status: CANCELLED
Start: 2018-02-28

## 2018-01-31 RX ORDER — ALBUTEROL SULFATE 0.83 MG/ML
2.5 SOLUTION RESPIRATORY (INHALATION)
Status: CANCELLED | OUTPATIENT
Start: 2018-02-28

## 2018-01-31 RX ORDER — ALBUTEROL SULFATE 90 UG/1
1-2 AEROSOL, METERED RESPIRATORY (INHALATION)
Status: CANCELLED
Start: 2018-03-07

## 2018-01-31 RX ORDER — SODIUM CHLORIDE 9 MG/ML
1000 INJECTION, SOLUTION INTRAVENOUS CONTINUOUS PRN
Status: CANCELLED
Start: 2018-02-14

## 2018-01-31 RX ADMIN — HEPARIN 500 UNITS: 100 SYRINGE at 10:14

## 2018-01-31 RX ADMIN — BORTEZOMIB 2.2 MG: 3.5 INJECTION, POWDER, LYOPHILIZED, FOR SOLUTION INTRAVENOUS; SUBCUTANEOUS at 11:22

## 2018-01-31 ASSESSMENT — PAIN SCALES - GENERAL
PAINLEVEL: NO PAIN (1)
PAINLEVEL: NO PAIN (0)

## 2018-01-31 NOTE — LETTER
"    1/31/2018         RE: Amira Arreola  [address]  [place] [address]        Dear Colleague,    Thank you for referring your patient, Amira Arreola, to the [facility]. Please see a copy of my visit note below.    Oncology Rooming Note    January 31, 2018 10:25 AM   Amira Arreola is a 85 year old female who presents for:    Chief Complaint   Patient presents with     Oncology Clinic Visit     RETURN-1 month follow up-Multiple myeloma not having achieved remission      Initial Vitals: /65 (BP Location: Right arm, Patient Position: Chair, Cuff Size: Adult Regular)  Pulse 79  Temp 98.1  F (36.7  C) (Oral)  Resp 16  Ht 1.676 m (5' 5.98\")  Wt 64.5 kg (142 lb 3.2 oz)  SpO2 99%  BMI 22.96 kg/m2 Estimated body mass index is 22.96 kg/(m^2) as calculated from the following:    Height as of this encounter: 1.676 m (5' 5.98\").    Weight as of this encounter: 64.5 kg (142 lb 3.2 oz). Body surface area is 1.73 meters squared.  No Pain (0) Comment: Data Unavailable   No LMP recorded. Patient is postmenopausal.  Allergies reviewed: Yes  Medications reviewed: Yes    Medications: Medication refills not needed today.  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     1.  She had work-up for thrombocytopenia.       - On 09/21/2015, WBC of 4.2, hemoglobin of 13.2 and platelets of 138. CMP normal except mildly low protein of 6.4.   -On 09/29/2015, SPEP does not reveal any M-spike.   - On 10/02/2015, JANET does not reveal any monoclonal protein.     - On 10/22/2015, urine immunofixation reveals monoclonal free kappa light chain.    2. On 05/11/2016, kappa light chain of 50, lambda light " chain of 0.32 and ratio of kappa to lambda of 156.2.  3. Skeletal survey on 05/23/2016 does not reveal any lytic lesion.    4. Bone marrow biopsy on 05/25/2016 reveals 40-50% kappa light chain restricted plasma cells.  Cytogenetics is normal. 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    5. MRI of bones on 06/21/2016 and 06/22/2016 reveals myeloma lesions.  6. On 08/24/2016, she was started on revlimid 25 mg 3 weeks on and 1 week off along with dexamethasone 20 mg weekly. Due to cytopenia, dose was subsequently reduced to 15 mg a day. Treatment in between had to be delayed because of cytopenia. She did not have any significant response to treatment.   7. Velcade and dexamethasone started on 03/21/2017.    8. On 03/21/2017, kappa free light chain was 52.5.  It decreased to 41.75 on 04/18/2017.  It  increased to 60.75 on 05/16/2017.    9. Daratumumab added on 05/31/2017.   10.  On 06/28/2017, kappa free light chain is down to 6.43.  11.  On 07/26/2017, kappa free light chain is 13.10.  12.  On 08/23/2017, kappa free light chain is 8.29.  13.  On 09/20/2017, kappa free light chain of 4.17.   14.  On 10/18/2017, kappa free light chain of 2.93.   15.  On 12/13/2017, kappa free light chain of 2.68.   16. On 01/17/2018, kappa free light chain of 2.35.     SUBJECTIVE:    Mrs. Amira Arreola is an 85-year-old female with kappa light chain multiple myeloma.  She is currently on Velcade, dexamethasone and daratumumab.  Her disease is responding to treatment. On 01/17/2018, kappa free light chain of 2.35.       Review of system:  Overall her condition is stable. She has chronic fatigue.  No worsening of fatigue. She  is under stress as her  is not doing well.     Patient also has chronic pain.  Most of the pain is in the mid lower back.  Sometime in the upper back.  Sometimes in both the legs.  She takes  oxycodone 2 pills a day which helps.  She wants a refill on that.      Denies any headache.  She gets occasional dizziness. No falling. No chest pain.  No difficulty breathing.  No nausea or vomiting.  Appetite is fairly good.  No fever or chills.  No urinary or bowel complaints.  Patient has mild pedal edema.  She uses Kishore stocking which helps.      PHYSICAL EXAMINATION:   Alert and oriented x 3.   EYES:  No icterus.   THROAT:  No ulcer or thrush.   NECK:  Supple. No lymphadenopathy.   AXILLAE:  No lymphadenopathy.   LUNGS:  Good air entry bilaterally.  No crackles or wheezing.   HEART:  Regular.  No murmur.   ABDOMEN:  Soft and nontender.  No mass.   EXTREMITIES: Bilateral pedal edema. No calf swelling or tenderness.   SKIN:  There are a few ecchymoses.  No petechia. The patient is on warfarin.         LABORATORY DATA:  Reviewed.       ASSESSMENT:   1.  An 85-year-old female with kappa free light chain multiple myeloma on treatment with Velcade, dexamethasone and daratumumab.  Disease is responding.     2.  Fatigue secondary to age, myeloma, and chemotherapy.   3.  Chronic back pain.   4.   Intermittent dizziness.      PLAN:   1.   Discussed with her regarding multiple myeloma. Disease is responding to Velcade, dexamethasone and daratumumab.  She will continue on it.  She is tolerating it well.  She is not having any neuropathy from Velcade.        2.  Patient has chronic back pain.  Prescription of oxycodone refilled.       3. Patient is on Zometa every 3 months.  That will be continued.  She is not having any dental or jaw related complications.  No  dental or jaw pain, swelling or infection.       4.  She had a few questions which were all answered.  I will see her back in 1 month's time.  I advised her to see a physician if she has infection, fever, chills, worsening bone pain or any other concerns.     Again, thank you for allowing me to participate in the care of your patient.         Sincerely,        Shayne Roberts MD

## 2018-01-31 NOTE — PROGRESS NOTES
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     1.  She had work-up for thrombocytopenia.       - On 09/21/2015, WBC of 4.2, hemoglobin of 13.2 and platelets of 138. CMP normal except mildly low protein of 6.4.   -On 09/29/2015, SPEP does not reveal any M-spike.   - On 10/02/2015, JANET does not reveal any monoclonal protein.     - On 10/22/2015, urine immunofixation reveals monoclonal free kappa light chain.    2. On 05/11/2016, kappa light chain of 50, lambda light chain of 0.32 and ratio of kappa to lambda of 156.2.  3. Skeletal survey on 05/23/2016 does not reveal any lytic lesion.    4. Bone marrow biopsy on 05/25/2016 reveals 40-50% kappa light chain restricted plasma cells.  Cytogenetics is normal. 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    5. MRI of bones on 06/21/2016 and 06/22/2016 reveals myeloma lesions.  6. On 08/24/2016, she was started on revlimid 25 mg 3 weeks on and 1 week off along with dexamethasone 20 mg weekly. Due to cytopenia, dose was subsequently reduced to 15 mg a day. Treatment in between had to be delayed because of cytopenia. She did not have any significant response to treatment.   7. Velcade and dexamethasone started on 03/21/2017.    8. On 03/21/2017, kappa free light chain was 52.5.  It decreased to 41.75 on 04/18/2017.  It  increased to 60.75 on 05/16/2017.    9. Daratumumab added on 05/31/2017.   10.  On 06/28/2017, kappa free light chain is down to 6.43.  11.  On 07/26/2017, kappa free light chain is 13.10.  12.  On 08/23/2017, kappa free light chain is 8.29.  13.  On 09/20/2017, kappa free light chain of 4.17.   14.  On 10/18/2017, kappa free light chain of 2.93.   15.  On 12/13/2017, kappa free light chain of 2.68.   16. On 01/17/2018, kappa free light chain of 2.35.     SUBJECTIVE:    Mrs. Collier  Maxwell is an 85-year-old female with kappa light chain multiple myeloma.  She is currently on Velcade, dexamethasone and daratumumab.  Her disease is responding to treatment. On 01/17/2018, kappa free light chain of 2.35.       Review of system:  Overall her condition is stable. She has chronic fatigue.  No worsening of fatigue. She is under stress as her  is not doing well.     Patient also has chronic pain.  Most of the pain is in the mid lower back.  Sometime in the upper back.  Sometimes in both the legs.  She takes oxycodone 2 pills a day which helps.  She wants a refill on that.      Denies any headache.  She gets occasional dizziness. No falling. No chest pain.  No difficulty breathing.  No nausea or vomiting.  Appetite is fairly good.  No fever or chills.  No urinary or bowel complaints.  Patient has mild pedal edema.  She uses Kishore stocking which helps.      PHYSICAL EXAMINATION:   Alert and oriented x 3.   EYES:  No icterus.   THROAT:  No ulcer or thrush.   NECK:  Supple. No lymphadenopathy.   AXILLAE:  No lymphadenopathy.   LUNGS:  Good air entry bilaterally.  No crackles or wheezing.   HEART:  Regular.  No murmur.   ABDOMEN:  Soft and nontender.  No mass.   EXTREMITIES: Bilateral pedal edema. No calf swelling or tenderness.   SKIN:  There are a few ecchymoses.  No petechia. The patient is on warfarin.         LABORATORY DATA:  Reviewed.       ASSESSMENT:   1.  An 85-year-old female with kappa free light chain multiple myeloma on treatment with Velcade, dexamethasone and daratumumab.  Disease is responding.     2.  Fatigue secondary to age, myeloma, and chemotherapy.   3.  Chronic back pain.   4.  Intermittent dizziness.      PLAN:   1.  Discussed with her regarding multiple myeloma. Disease is responding to Velcade, dexamethasone and daratumumab.  She will continue on it.  She is tolerating it well.  She is not having any neuropathy from Velcade.        2.  Patient has chronic back pain.   Prescription of oxycodone refilled.       3. Patient is on Zometa every 3 months.  That will be continued.  She is not having any dental or jaw related complications.  No dental or jaw pain, swelling or infection.       4.  She had a few questions which were all answered.  I will see her back in 1 month's time.  I advised her to see a physician if she has infection, fever, chills, worsening bone pain or any other concerns.

## 2018-01-31 NOTE — MR AVS SNAPSHOT
After Visit Summary   1/31/2018    Amira Arreola    MRN: [id number]           Patient Information     Date Of Birth          7/17/1932        Visit Information        Provider Department      1/31/2018 10:00 AM SH INFUSION CHAIR 11 [facility]        Today's Diagnoses     Multiple myeloma not having achieved remission (H)    -  1    Paroxysmal atrial fibrillation (H)        Portacath in place           Follow-ups after your visit        Your next 10 appointments already scheduled     Feb 07, 2018 11:00 AM CST   Level 2 with SH INFUSION CHAIR 14   [facility] ([facility])    [facility] [place]  [address]  [place] [address]   [phone]            Feb 14, 2018 11:30 AM CST   Level 2 with SH INFUSION CHAIR 15   [facility] ([facility])    [facility] [place]  [address]  [place] [address]   [phone]            Feb 21, 2018 10:30 AM CST   Level 2 with SH INFUSION CHAIR 13   [facility] ([facility])    [facility] [place]  [address]  [place] [address]   [phone]            Feb 28, 2018  1:30 PM CST   Level 2 with SH INFUSION CHAIR 12   [facility] ([facility])    [facility] [place]  [address]  [place] [address]   [phone]            Feb 28, 2018  2:00 PM CST   Return Visit with Shayne Roberts MD   [facility] ([facility])    [facility] [place]  [address]  [place] [address]   [phone]            Mar 06, 2018 10:15 AM CST   Return Visit with Ramos Rivera MD   [facility] ([facility])    [address]  [place] [address]  "  [phone]              Who to contact     If you have questions or need follow up information about today's clinic visit or your schedule please contact [facility] directly at [phone].  Normal or non-critical lab and imaging results will be communicated to you by [employer], letter or phone within 4 business days after the clinic has received the results. If you do not hear from us within 7 days, please contact the clinic through [employer] or phone. If you have a critical or abnormal lab result, we will notify you by phone as soon as possible.  Submit refill requests through [employer] or call your pharmacy and they will forward the refill request to us. Please allow 3 business days for your refill to be completed.          Additional Information About Your Visit        [employer] Information     [employer] lets you send messages to your doctor, view your test results, renew your prescriptions, schedule appointments and more. To sign up, go to [email or web address] . Click on \"Log in\" on the left side of the screen, which will take you to the Welcome page. Then click on \"Sign up Now\" on the right side of the page.     You will be asked to enter the access code listed below, as well as some personal information. Please follow the directions to create your username and password.     Your access code is: [email or web address]  Expires: 4/3/2018  3:04 PM     Your access code will  in 90 days. If you need help or a new code, please call your [place] clinic or [phone].        Care EveryWhere ID     This is your Care EveryWhere ID. This could be used by other organizations to access your [place] medical records  [id number]        Your Vitals Were     Pulse Temperature Respirations Height Pulse Oximetry BMI (Body Mass Index)    79 98.1  F (36.7  C) (Oral) 16 1.676 m (5' 6\") 99% 22.95 kg/m2       Blood Pressure from Last 3 Encounters:   18 100/65   18 100/65   18 147/69 "    Weight from Last 3 Encounters:   01/31/18 64.5 kg (142 lb 3.2 oz)   01/31/18 64.5 kg (142 lb 3.2 oz)   01/24/18 63.7 kg (140 lb 6.4 oz)              We Performed the Following     CBC with platelets differential     INR          Where to get your medicines      Some of these will need a paper prescription and others can be bought over the counter.  Ask your nurse if you have questions.     Bring a paper prescription for each of these medications     LORazepam 0.5 MG tablet    oxyCODONE IR 15 MG tablet          Primary Care Provider Office Phone # Fax #    Addy Sean Frias [phone] [address]  [place] [address]        Equal Access to Services     SARA ZELAYA : Gissel Galloway, wajanetda luqadaha, qaybta kaalmada alonso, hung dominique . So [facility] [phone].    ATENCIÓN: Si habla español, tiene a barlow disposición servicios gratuitos de asistencia lingüística. [facility] [phone].    We comply with applicable federal civil rights laws and [place] laws. We do not discriminate on the basis of race, color, national origin, age, disability, sex, sexual orientation, or gender identity.            Thank you!     Thank you for choosing [facility]  for your care. Our goal is always to provide you with excellent care. Hearing back from our patients is one way we can continue to improve our services. Please take a few minutes to complete the written survey that you may receive in the mail after your visit with us. Thank you!             Your Updated Medication List - Protect others around you: Learn how to safely use, store and throw away your medicines at www.disposemymeds.org.          This list is accurate as of 1/31/18 12:18 PM.  Always use your most recent med list.                   Brand Name Dispense Instructions for use Diagnosis    acyclovir 400 MG tablet    ZOVIRAX    60 tablet    Take 1 tablet  (400 mg) by mouth 2 times daily Viral Prophylaxis.    Multiple myeloma not having achieved remission (H)       CALCIUM CITRATE + PO      Take 2,000 mg by mouth daily 2 tabs        carboxymethylcellulose 0.5 % Soln ophthalmic solution    REFRESH PLUS     1 drop 4 times daily        CLARINEX PO      Take by mouth daily Taking claritin        COMPAZINE PO      Take 10 mg by mouth daily as needed        cycloSPORINE 0.05 % ophthalmic emulsion    RESTASIS     Place 1 drop into both eyes every 12 hours        DAILY MULTIVITAMIN PO      Take 1 tablet by mouth daily.    Routine general medical examination at a health care facility       * dexamethasone 4 MG tablet    DECADRON    28 tablet    Take 20mg (5 tablets) by mouth every week on the morning of velcade injection.    Multiple myeloma not having achieved remission (H)       * dexamethasone 4 MG tablet    DECADRON    28 tablet    Take 20mg (5 tablets) by mouth every week on the morning of velcade injection.    Multiple myeloma not having achieved remission (H)       erythromycin ophthalmic ointment    ROMYCIN     Place 1 Application into both eyes At Bedtime        GENTLE STOOL SOFTENER PO      Take 100 mg by mouth daily        lidocaine-prilocaine cream    EMLA    30 g    Apply topically as needed for moderate pain Apply dollop size amount to port site 30-60 min prior to accessing    Multiple myeloma not having achieved remission (H)       LORazepam 0.5 MG tablet    ATIVAN    30 tablet    Take 1 tablet (0.5 mg) by mouth every 8 hours as needed for anxiety    Multiple myeloma not having achieved remission (H)       metoprolol succinate 50 MG 24 hr tablet    TOPROL XL    270 tablet    Take 2 tablets (100mg) in the morning and 1 tablet (50mg) in the evening by mouth daily    Paroxysmal atrial fibrillation (H)       oxyCODONE IR 15 MG tablet    ROXICODONE    60 tablet    Take 1 tablet (15 mg) by mouth every 8 hours as needed for pain maximum 4 tablet(s) per day    Multiple  myeloma not having achieved remission (H)       polyethylene glycol powder    MIRALAX/GLYCOLAX     Take 1 capful by mouth daily as needed    Bilateral leg edema       SIMBRINZA 1-0.2 % ophthalmic suspension   Generic drug:  brinzolamide-brimonidine      Place 1 drop into the right eye 2 times daily 1 drop AM and PM        TYLENOL PO      Take 500 mg by mouth every 6 hours as needed for mild pain or fever        UNABLE TO FIND      MEDICATION NAME: Fresh Coat eye drops        VITAMIN D3 PO      Take 1,000 Units by mouth daily        warfarin 4 MG tablet    COUMADIN    110 tablet    TAKE ONE AND ONE-HALF TABLETS BY MOUTH ON MONDAY, WEDNESDAY, AND FRIDAY AND ONE TABLET THE OTHER DAYS OF THE WEEK    Paroxysmal atrial fibrillation (H), Long-term (current) use of anticoagulants       ZOMETA IV      Inject into the vein every 30 days Every 3 month dosing        * Notice:  This list has 2 medication(s) that are the same as other medications prescribed for you. Read the directions carefully, and ask your doctor or other care provider to review them with you.

## 2018-01-31 NOTE — PROGRESS NOTES
Can you please dose this patients coumadin and let the patient know?    Thanks    Addy Frias M.D.

## 2018-01-31 NOTE — PROGRESS NOTES
Infusion Nursing Note:  Amira Arreola presents today for Velcade D15,C9.    Patient seen by provider today: Yes:    present during visit today: Not Applicable.    Note: No concerns or changes to report, except for some leg pain in the mornings and some dizziness at times and unsteady..    Intravenous Access:  Labs drawn without difficulty.  Implanted Port.    Treatment Conditions:  Lab Results   Component Value Date    HGB 12.0 01/31/2018     Lab Results   Component Value Date    WBC 6.6 01/31/2018      Lab Results   Component Value Date    ANEU 4.5 01/31/2018     Lab Results   Component Value Date     01/31/2018      Lab Results   Component Value Date     06/13/2017                   Lab Results   Component Value Date    POTASSIUM 4.0 06/13/2017           No results found for: MAG         Lab Results   Component Value Date    CR 0.59 11/08/2017                   Lab Results   Component Value Date    BRADLEY 9.4 11/08/2017                Lab Results   Component Value Date    BILITOTAL 0.4 01/17/2018           Lab Results   Component Value Date    ALBUMIN 3.2 01/17/2018                    Lab Results   Component Value Date    ALT 24 01/17/2018           Lab Results   Component Value Date    AST 18 01/17/2018       Results reviewed, labs MET treatment parameters, ok to proceed with treatment.      Post Infusion Assessment:  Patient tolerated injection without incident.  Site patent and intact, free from redness, edema or discomfort.  No evidence of extravasations.  Access discontinued per protocol.    Discharge Plan:   Discharge instructions reviewed with: Patient.  Patient and/or family verbalized understanding of discharge instructions and all questions answered.  Copy of AVS reviewed with patient and/or family.  Patient will return 2/7 for next appointment.  Patient discharged in stable condition accompanied by: self.  Departure Mode: Ambulatory.    Rosangela Reese  RN

## 2018-01-31 NOTE — MR AVS SNAPSHOT
Amira IVY Arreola   1/31/2018   Anticoagulation Therapy Visit    Description:  85 year old female   Provider:  Addy Frias MD   Department:  [facility]           INR as of 1/31/2018     Today's INR 2.84      Anticoagulation Summary as of 1/31/2018     INR goal 2.0-3.0   Today's INR 2.84   Full instructions 6 mg on Mon, Fri; 4 mg all other days   Next INR check 2/7/2018    Indications   Long-term (current) use of anticoagulants [Z79.01] [Z79.01]  Atrial fibrillation (H) [I48.91] (Resolved) [I48.91]         January 2018 Details    Sun Mon Tue Wed Thu Fri Sat      1               2               3               4               5               6                 7               8               9               10               11               12               13                 14               15               16               17               18               19               20                 21               22               23               24               25               26               27                 28               29               30               31      4 mg   See details          Date Details   01/31 This INR check               How to take your warfarin dose     To take:  4 mg Take 1 of the 4 mg tablets.           February 2018 Details    Sun Mon Tue Wed Thu Fri Sat         1      4 mg         2      6 mg         3      4 mg           4      4 mg         5      6 mg         6      4 mg         7            8               9               10                 11               12               13               14               15               16               17                 18               19               20               21               22               23               24                 25               26               27               28                   Date Details   No additional details    Date of next INR:  2/7/2018         How to take your warfarin dose     To  take:  4 mg Take 1 of the 4 mg tablets.    To take:  6 mg Take 1.5 of the 4 mg tablets.

## 2018-01-31 NOTE — PROGRESS NOTES
"Oncology Rooming Note    January 31, 2018 10:25 AM   Amira Arreola is a 85 year old female who presents for:    Chief Complaint   Patient presents with     Oncology Clinic Visit     RETURN-1 month follow up-Multiple myeloma not having achieved remission      Initial Vitals: /65 (BP Location: Right arm, Patient Position: Chair, Cuff Size: Adult Regular)  Pulse 79  Temp 98.1  F (36.7  C) (Oral)  Resp 16  Ht 1.676 m (5' 5.98\")  Wt 64.5 kg (142 lb 3.2 oz)  SpO2 99%  BMI 22.96 kg/m2 Estimated body mass index is 22.96 kg/(m^2) as calculated from the following:    Height as of this encounter: 1.676 m (5' 5.98\").    Weight as of this encounter: 64.5 kg (142 lb 3.2 oz). Body surface area is 1.73 meters squared.  No Pain (0) Comment: Data Unavailable   No LMP recorded. Patient is postmenopausal.  Allergies reviewed: Yes  Medications reviewed: Yes    Medications: Medication refills not needed today.  Pharmacy name entered into [employer]: [id number] - [place] [address] AT [facility] [address]    Clinical concerns: none     5 minutes for nursing intake (face to face time)     Nicki Amaro CMA            "

## 2018-01-31 NOTE — PROGRESS NOTES
ANTICOAGULATION FOLLOW-UP CLINIC VISIT    Patient Name:  Amira Arreola  Date:  1/31/2018  Contact Type:  Telephone    SUBJECTIVE:     Patient Findings     Positives No Problem Findings           OBJECTIVE    INR   Date Value Ref Range Status   01/31/2018 2.84 (H) 0.86 - 1.14 Final       ASSESSMENT / PLAN  INR assessment THER    Recheck INR In: 1 WEEK    INR Location Outside lab      Anticoagulation Summary as of 1/31/2018     INR goal 2.0-3.0   Today's INR 2.84   Maintenance plan 6 mg (4 mg x 1.5) on Mon, Fri; 4 mg (4 mg x 1) all other days   Full instructions 6 mg on Mon, Fri; 4 mg all other days   Weekly total 32 mg   No change documented Tigist Corral, SHELLIE   Plan last modified Tigist Corral RN (1/10/2018)   Next INR check 2/7/2018   Target end date Indefinite    Indications   Long-term (current) use of anticoagulants [Z79.01] [Z79.01]  Atrial fibrillation (H) [I48.91] (Resolved) [I48.91]         Anticoagulation Episode Summary     INR check location     Preferred lab     Send INR reminders to [facility] ANTICOAGULATION    Comments patient have standing INR order to be draw at infusion visit.  advise to call [facility] when have INR draw for dosing instruction.  patient can be reach at home phone [phone]      Anticoagulation Care Providers     Provider Role Specialty Phone number    Addy Frias MD [facility] [phone]            See the Encounter Report to view Anticoagulation Flowsheet and Dosing Calendar (Go to Encounters tab in chart review, and find the Anticoagulation Therapy Visit)    Dosage adjustment made based on physician directed care plan. Result note received in Epic from the infusion center. Patient has INR drawn at time of infusion each week.  INR 2.84.  Continue same dosing and recheck 1-2 weeks. (she said next Wed)    Tigist Corral RN

## 2018-01-31 NOTE — MR AVS SNAPSHOT
After Visit Summary   1/31/2018    Amira Arreola    MRN: [id number]           Patient Information     Date Of Birth          7/17/1932        Visit Information        Provider Department      1/31/2018 10:30 AM Shayne Roberts MD [facility]        Today's Diagnoses     Multiple myeloma not having achieved remission (H)    -  1      Care Instructions    Continue chemotherapy.  Follow up in 1 month.          Follow-ups after your visit        Your next 10 appointments already scheduled     Feb 07, 2018 11:00 AM CST   Level 2 with SH INFUSION CHAIR 14   [facility] ([facility])    [facility] [place]  [address]  [place] [address]   [phone]            Feb 14, 2018 11:30 AM CST   Level 2 with SH INFUSION CHAIR 15   [facility] ([facility])    [facility] [place]  [address]  [place] [address]   [phone]            Feb 21, 2018 10:30 AM CST   Level 2 with SH INFUSION CHAIR 13   [facility] ([facility])    [facility] [place]  [address]  [place] [address]   [phone]            Feb 21, 2018  1:30 PM CST   Level 2 with SH INFUSION CHAIR 1   [facility] ([facility])    [facility] [place]  [address]  [place] [address]   [phone]            Feb 28, 2018  2:00 PM CST   Return Visit with Shayne Roberts MD   [facility] ([facility])    [facility] [place]  [address]  [place] [address]   [phone]            Mar 06, 2018 10:15 AM CST   Return Visit with Ramos Rivera MD   [facility] ([facility])    [address]  [place] [address]   [phone]     "          Who to contact     If you have questions or need follow up information about today's clinic visit or your schedule please contact [facility] directly at [phone].  Normal or non-critical lab and imaging results will be communicated to you by MyChart, letter or phone within 4 business days after the clinic has received the results. If you do not hear from us within 7 days, please contact the clinic through [employer] or phone. If you have a critical or abnormal lab result, we will notify you by phone as soon as possible.  Submit refill requests through [employer] or call your pharmacy and they will forward the refill request to us. Please allow 3 business days for your refill to be completed.          Additional Information About Your Visit        [employer] Information     [employer] lets you send messages to your doctor, view your test results, renew your prescriptions, schedule appointments and more. To sign up, go to [email or web address] . Click on \"Log in\" on the left side of the screen, which will take you to the Welcome page. Then click on \"Sign up Now\" on the right side of the page.     You will be asked to enter the access code listed below, as well as some personal information. Please follow the directions to create your username and password.     Your access code is: [email or web address]  Expires: 4/3/2018  3:04 PM     Your access code will  in 90 days. If you need help or a new code, please call your [facility] clinic or [phone].        Care EveryWhere ID     This is your Care EveryWhere ID. This could be used by other organizations to access your [facility] medical records  [id number]        Your Vitals Were     Pulse Temperature Respirations Height Pulse Oximetry BMI (Body Mass Index)    79 98.1  F (36.7  C) (Oral) 16 1.676 m (5' 5.98\") 99% 22.96 kg/m2       Blood Pressure from Last 3 Encounters:   18 100/65   18 100/65   18 147/69    Weight from Last 3 Encounters: "   01/31/18 64.5 kg (142 lb 3.2 oz)   01/31/18 64.5 kg (142 lb 3.2 oz)   01/24/18 63.7 kg (140 lb 6.4 oz)              Today, you had the following     No orders found for display         Where to get your medicines      Some of these will need a paper prescription and others can be bought over the counter.  Ask your nurse if you have questions.     Bring a paper prescription for each of these medications     LORazepam 0.5 MG tablet    oxyCODONE IR 15 MG tablet          Primary Care Provider Office Phone # Fax #    Addy Sean Frias [phone] [address] [facility] [address]  [facility] [address]        Equal Access to Services     [facility]INI [facility] : Hadii liane Galloway, watanya gutierrez, qaybta kaalmada alonso, hung dominique . So [facility] [phone].    ATENCIÓN: Si habla español, tiene a barlow disposición servicios gratuitos de asistencia lingüística. [facility] [phone].    We comply with applicable federal civil rights laws and [place] laws. We do not discriminate on the basis of race, color, national origin, age, disability, sex, sexual orientation, or gender identity.            Thank you!     Thank you for choosing [facility]  for your care. Our goal is always to provide you with excellent care. Hearing back from our patients is one way we can continue to improve our services. Please take a few minutes to complete the written survey that you may receive in the mail after your visit with us. Thank you!             Your Updated Medication List - Protect others around you: Learn how to safely use, store and throw away your medicines at www.disposemymeds.org.          This list is accurate as of 1/31/18 11:12 AM.  Always use your most recent med list.                   Brand Name Dispense Instructions for use Diagnosis    acyclovir 400 MG tablet    ZOVIRAX    60 tablet    Take 1 tablet (400 mg) by mouth 2 times daily Viral Prophylaxis.    Multiple  myeloma not having achieved remission (H)       CALCIUM CITRATE + PO      Take 2,000 mg by mouth daily 2 tabs        carboxymethylcellulose 0.5 % Soln ophthalmic solution    REFRESH PLUS     1 drop 4 times daily        CLARINEX PO      Take by mouth daily Taking claritin        COMPAZINE PO      Take 10 mg by mouth daily as needed        cycloSPORINE 0.05 % ophthalmic emulsion    RESTASIS     Place 1 drop into both eyes every 12 hours        DAILY MULTIVITAMIN PO      Take 1 tablet by mouth daily.    Routine general medical examination at a health care facility       * dexamethasone 4 MG tablet    DECADRON    28 tablet    Take 20mg (5 tablets) by mouth every week on the morning of velcade injection.    Multiple myeloma not having achieved remission (H)       * dexamethasone 4 MG tablet    DECADRON    28 tablet    Take 20mg (5 tablets) by mouth every week on the morning of velcade injection.    Multiple myeloma not having achieved remission (H)       erythromycin ophthalmic ointment    ROMYCIN     Place 1 Application into both eyes At Bedtime        GENTLE STOOL SOFTENER PO      Take 100 mg by mouth daily        lidocaine-prilocaine cream    EMLA    30 g    Apply topically as needed for moderate pain Apply dollop size amount to port site 30-60 min prior to accessing    Multiple myeloma not having achieved remission (H)       LORazepam 0.5 MG tablet    ATIVAN    30 tablet    Take 1 tablet (0.5 mg) by mouth every 8 hours as needed for anxiety    Multiple myeloma not having achieved remission (H)       metoprolol succinate 50 MG 24 hr tablet    TOPROL XL    270 tablet    Take 2 tablets (100mg) in the morning and 1 tablet (50mg) in the evening by mouth daily    Paroxysmal atrial fibrillation (H)       oxyCODONE IR 15 MG tablet    ROXICODONE    60 tablet    Take 1 tablet (15 mg) by mouth every 8 hours as needed for pain maximum 4 tablet(s) per day    Multiple myeloma not having achieved remission (H)       polyethylene  glycol powder    MIRALAX/GLYCOLAX     Take 1 capful by mouth daily as needed    Bilateral leg edema       SIMBRINZA 1-0.2 % ophthalmic suspension   Generic drug:  brinzolamide-brimonidine      Place 1 drop into the right eye 2 times daily 1 drop AM and PM        TYLENOL PO      Take 500 mg by mouth every 6 hours as needed for mild pain or fever        UNABLE TO FIND      MEDICATION NAME: Fresh Coat eye drops        VITAMIN D3 PO      Take 1,000 Units by mouth daily        warfarin 4 MG tablet    COUMADIN    110 tablet    TAKE ONE AND ONE-HALF TABLETS BY MOUTH ON MONDAY, WEDNESDAY, AND FRIDAY AND ONE TABLET THE OTHER DAYS OF THE WEEK    Paroxysmal atrial fibrillation (H), Long-term (current) use of anticoagulants       ZOMETA IV      Inject into the vein every 30 days Every 3 month dosing        * Notice:  This list has 2 medication(s) that are the same as other medications prescribed for you. Read the directions carefully, and ask your doctor or other care provider to review them with you.

## 2018-02-06 DIAGNOSIS — C90.00 MULTIPLE MYELOMA NOT HAVING ACHIEVED REMISSION (H): ICD-10-CM

## 2018-02-06 RX ORDER — DEXAMETHASONE 4 MG/1
TABLET ORAL
Qty: 28 TABLET | Refills: 0 | Status: SHIPPED | OUTPATIENT
Start: 2018-02-06 | End: 2018-02-21

## 2018-02-07 ENCOUNTER — OFFICE VISIT (OUTPATIENT)
Dept: FAMILY MEDICINE | Facility: CLINIC | Age: 83
End: 2018-02-07
Payer: COMMERCIAL

## 2018-02-07 ENCOUNTER — ANTICOAGULATION THERAPY VISIT (OUTPATIENT)
Dept: FAMILY MEDICINE | Facility: CLINIC | Age: 83
End: 2018-02-07

## 2018-02-07 ENCOUNTER — HOSPITAL ENCOUNTER (OUTPATIENT)
Facility: CLINIC | Age: 83
Setting detail: SPECIMEN
Discharge: HOME | End: 2018-02-07
Attending: INTERNAL MEDICINE | Admitting: INTERNAL MEDICINE
Payer: MEDICARE

## 2018-02-07 ENCOUNTER — INFUSION THERAPY VISIT (OUTPATIENT)
Dept: INFUSION THERAPY | Facility: CLINIC | Age: 83
End: 2018-02-07
Attending: INTERNAL MEDICINE
Payer: MEDICARE

## 2018-02-07 VITALS
RESPIRATION RATE: 16 BRPM | WEIGHT: 144 LBS | HEART RATE: 105 BPM | BODY MASS INDEX: 23.25 KG/M2 | TEMPERATURE: 97.6 F | DIASTOLIC BLOOD PRESSURE: 88 MMHG | SYSTOLIC BLOOD PRESSURE: 141 MMHG

## 2018-02-07 VITALS
TEMPERATURE: 98.6 F | RESPIRATION RATE: 14 BRPM | OXYGEN SATURATION: 99 % | WEIGHT: 144 LBS | HEIGHT: 66 IN | SYSTOLIC BLOOD PRESSURE: 153 MMHG | HEART RATE: 114 BPM | DIASTOLIC BLOOD PRESSURE: 78 MMHG | BODY MASS INDEX: 23.14 KG/M2

## 2018-02-07 DIAGNOSIS — I48.0 PAROXYSMAL ATRIAL FIBRILLATION (H): ICD-10-CM

## 2018-02-07 DIAGNOSIS — Z79.01 LONG-TERM (CURRENT) USE OF ANTICOAGULANTS: ICD-10-CM

## 2018-02-07 DIAGNOSIS — R21 RASH AND NONSPECIFIC SKIN ERUPTION: ICD-10-CM

## 2018-02-07 DIAGNOSIS — Z95.828 PORTACATH IN PLACE: ICD-10-CM

## 2018-02-07 DIAGNOSIS — Z91.81 AT RISK FOR FALLING: ICD-10-CM

## 2018-02-07 DIAGNOSIS — C79.51 CANCER, METASTATIC TO BONE (H): ICD-10-CM

## 2018-02-07 DIAGNOSIS — H61.23 BILATERAL IMPACTED CERUMEN: Primary | ICD-10-CM

## 2018-02-07 DIAGNOSIS — C90.00 MULTIPLE MYELOMA NOT HAVING ACHIEVED REMISSION (H): Primary | ICD-10-CM

## 2018-02-07 LAB
ALBUMIN SERPL-MCNC: 3.3 G/DL (ref 3.4–5)
BASOPHILS # BLD AUTO: 0 10E9/L (ref 0–0.2)
BASOPHILS NFR BLD AUTO: 0 %
CALCIUM SERPL-MCNC: 9.4 MG/DL (ref 8.5–10.1)
CREAT SERPL-MCNC: 0.53 MG/DL (ref 0.52–1.04)
DIFFERENTIAL METHOD BLD: ABNORMAL
EOSINOPHIL # BLD AUTO: 0 10E9/L (ref 0–0.7)
EOSINOPHIL NFR BLD AUTO: 0 %
ERYTHROCYTE [DISTWIDTH] IN BLOOD BY AUTOMATED COUNT: 14.4 % (ref 10–15)
GFR SERPL CREATININE-BSD FRML MDRD: >90 ML/MIN/1.7M2
HCT VFR BLD AUTO: 36.9 % (ref 35–47)
HGB BLD-MCNC: 12.3 G/DL (ref 11.7–15.7)
IMM GRANULOCYTES # BLD: 0 10E9/L (ref 0–0.4)
IMM GRANULOCYTES NFR BLD: 0.3 %
INR PPP: 3.02 (ref 0.86–1.14)
LYMPHOCYTES # BLD AUTO: 0.3 10E9/L (ref 0.8–5.3)
LYMPHOCYTES NFR BLD AUTO: 3.4 %
MCH RBC QN AUTO: 33.2 PG (ref 26.5–33)
MCHC RBC AUTO-ENTMCNC: 33.3 G/DL (ref 31.5–36.5)
MCV RBC AUTO: 100 FL (ref 78–100)
MONOCYTES # BLD AUTO: 0.1 10E9/L (ref 0–1.3)
MONOCYTES NFR BLD AUTO: 1.2 %
NEUTROPHILS # BLD AUTO: 7.4 10E9/L (ref 1.6–8.3)
NEUTROPHILS NFR BLD AUTO: 95.1 %
PLATELET # BLD AUTO: 155 10E9/L (ref 150–450)
RBC # BLD AUTO: 3.7 10E12/L (ref 3.8–5.2)
WBC # BLD AUTO: 7.7 10E9/L (ref 4–11)

## 2018-02-07 PROCEDURE — 99213 OFFICE O/P EST LOW 20 MIN: CPT | Performed by: FAMILY MEDICINE

## 2018-02-07 PROCEDURE — 82565 ASSAY OF CREATININE: CPT | Performed by: INTERNAL MEDICINE

## 2018-02-07 PROCEDURE — 96374 THER/PROPH/DIAG INJ IV PUSH: CPT

## 2018-02-07 PROCEDURE — 85025 COMPLETE CBC W/AUTO DIFF WBC: CPT | Performed by: INTERNAL MEDICINE

## 2018-02-07 PROCEDURE — 82040 ASSAY OF SERUM ALBUMIN: CPT | Performed by: INTERNAL MEDICINE

## 2018-02-07 PROCEDURE — 96401 CHEMO ANTI-NEOPL SQ/IM: CPT

## 2018-02-07 PROCEDURE — 99207 ZZC NO CHARGE NURSE ONLY: CPT | Performed by: INTERNAL MEDICINE

## 2018-02-07 PROCEDURE — 25000128 H RX IP 250 OP 636: Performed by: INTERNAL MEDICINE

## 2018-02-07 PROCEDURE — 82310 ASSAY OF CALCIUM: CPT | Performed by: INTERNAL MEDICINE

## 2018-02-07 PROCEDURE — 85610 PROTHROMBIN TIME: CPT | Performed by: INTERNAL MEDICINE

## 2018-02-07 RX ORDER — HEPARIN SODIUM (PORCINE) LOCK FLUSH IV SOLN 100 UNIT/ML 100 UNIT/ML
500 SOLUTION INTRAVENOUS EVERY 8 HOURS
Status: CANCELLED
Start: 2018-02-07

## 2018-02-07 RX ORDER — HEPARIN SODIUM (PORCINE) LOCK FLUSH IV SOLN 100 UNIT/ML 100 UNIT/ML
500 SOLUTION INTRAVENOUS EVERY 8 HOURS
Status: DISCONTINUED | OUTPATIENT
Start: 2018-02-07 | End: 2018-02-07 | Stop reason: HOSPADM

## 2018-02-07 RX ORDER — ZOLEDRONIC ACID 0.04 MG/ML
4 INJECTION, SOLUTION INTRAVENOUS ONCE
Status: COMPLETED | OUTPATIENT
Start: 2018-02-07 | End: 2018-02-07

## 2018-02-07 RX ORDER — TRIAMCINOLONE ACETONIDE 5 MG/G
CREAM TOPICAL
Qty: 15 G | Refills: 0 | Status: SHIPPED | OUTPATIENT
Start: 2018-02-07 | End: 2018-02-28

## 2018-02-07 RX ADMIN — ZOLEDRONIC ACID 4 MG: 0.04 INJECTION, SOLUTION INTRAVENOUS at 12:12

## 2018-02-07 RX ADMIN — HEPARIN 500 UNITS: 100 SYRINGE at 12:28

## 2018-02-07 RX ADMIN — BORTEZOMIB 2.2 MG: 3.5 INJECTION, POWDER, LYOPHILIZED, FOR SOLUTION INTRAVENOUS; SUBCUTANEOUS at 11:48

## 2018-02-07 ASSESSMENT — PAIN SCALES - GENERAL: PAINLEVEL: NO PAIN (0)

## 2018-02-07 NOTE — PROGRESS NOTES
SUBJECTIVE:   Amira Arreola is a 85 year old female who presents to clinic today for the following health issues:      Ear Problem       Duration: 3 days     Description (location/character/radiation): hard time hearing in both ears , feeling plugged    Intensity:  moderate    Accompanying signs and symptoms: none no other URI symptoms she was a chills.    History (similar episodes/previous evaluation): None    Precipitating or alleviating factors: None    Therapies tried and outcome: Debrox       PROBLEMS TO ADD ON...  Also has a rough rash on and that has been feeling irritated and itchy lately.  OTC stuff not helping.    Problem list and histories reviewed & adjusted, as indicated.  Additional history: as documented    Patient Active Problem List   Diagnosis     Advanced directives, counseling/discussion     Benign essential hypertension     Colonic polyp     CARDIOVASCULAR SCREENING; LDL GOAL LESS THAN 160     Hyperlipidemia LDL goal <160     Sciatica of left side     Osteoporosis     OAB (overactive bladder)     Elevated MCV     Thrombocytopenia (H)     MGUS (monoclonal gammopathy of unknown significance)     Ascending aorta dilatation (H)     SVT (supraventricular tachycardia) (H)     Paroxysmal atrial fibrillation (H)     Long-term (current) use of anticoagulants [Z79.01]     Cancer, metastatic to bone (H)     Multiple myeloma not having achieved remission (H)     Portacath in place     Past Surgical History:   Procedure Laterality Date     BONE MARROW BIOPSY, BONE SPECIMEN, NEEDLE/TROCAR N/A 2016    Procedure: BIOPSY BONE MARROW;  Surgeon: Bryan Patel MD;  Location: [facility]     CATARACT IOL, RT/LT        SECTION  ,      COLONOSCOPY  2013    Procedure: COLONOSCOPY;  COLONOSCOPY;  Surgeon: Steffany Rockwell MD;  Location: [facility]     EXCISE EXOSTOSIS TIBIA / FIBULA  2014    Procedure: EXCISE EXOSTOSIS TIBIA / FIBULA;  Surgeon: Naila Pichardo MD;   "Location: [facility]     hysteroscopy and d and c  2002    due to bleeding     left anle replacement  2007     right ankle surgery  2003       Social History   Substance Use Topics     Smoking status: Never Smoker     Smokeless tobacco: Never Used     Alcohol use No     Family History   Problem Relation Age of Onset     HEART DISEASE Father      C.A.D. Mother      CEREBROVASCULAR DISEASE Brother      Family History Negative Sister      Family History Negative Sister      Family History Negative Brother            Reviewed and updated as needed this visit by clinical staff       Reviewed and updated as needed this visit by Provider         ROS:  Constitutional, HEENT, cardiovascular, pulmonary, GI, , musculoskeletal, neuro, skin, endocrine and psych systems are negative, except as otherwise noted.    OBJECTIVE:     /78 (Cuff Size: Adult Regular)  Pulse 114  Temp 98.6  F (37  C) (Tympanic)  Resp 14  Ht 5' 5.5\" (1.664 m)  Wt 144 lb (65.3 kg)  SpO2 99%  BMI 23.6 kg/m2  Body mass index is 23.6 kg/(m^2).  GENERAL: healthy, alert and no distress  EYES: Eyes grossly normal to inspection  HE ENT,  throat without erythema.  Ears both occluded with thick wax.  No sinus tenderness.  RESP: lungs clear to auscultation - no rales, rhonchi or wheezes  CV: regular rate and rhythm, normal S1 S2, no S3 or S4,  SKIN: no suspicious lesions,  except left hadn by thumb area , has erythematous area appro an inch size with dry sacly skin .         ASSESSMENT/PLAN:           (H61.23) Bilateral impacted cerumen  Comment:   Plan: Cerumenosis is noted.  Wax is removed by  manual debridement with good results.  TMs looked normal.  Instructions for home care to prevent wax buildup are given.    (R21) Rash and nonspecific skin eruption  Comment: Likely mild eczema/dry skin  Plan: triamcinolone (KENALOG) 0.5 % cream        Skin care is discussed,  follow-up as needed      (Z91.81) At risk for falling  (primary encounter " diagnosis)  Comment:   Plan: No concerns    Patient expressed understanding and agreement with treatment plan. All patient's questions were answered, will let me know if has more later.  Medications: Rx's: Reviewed the potential side effects/complications of medications prescribed.       Esme Bone MD  [facility]

## 2018-02-07 NOTE — NURSING NOTE
"Chief Complaint   Patient presents with     Ear Problem       Initial /78 (Cuff Size: Adult Regular)  Pulse 114  Temp 98.6  F (37  C) (Tympanic)  Resp 14  Ht 5' 5.5\" (1.664 m)  Wt 144 lb (65.3 kg)  SpO2 99%  BMI 23.6 kg/m2 Estimated body mass index is 23.6 kg/(m^2) as calculated from the following:    Height as of this encounter: 5' 5.5\" (1.664 m).    Weight as of this encounter: 144 lb (65.3 kg).  Medication Reconciliation: complete   Jigna Hickey, CMA    "

## 2018-02-07 NOTE — MR AVS SNAPSHOT
Amiragino Arreola   2/7/2018   Anticoagulation Therapy Visit    Description:  85 year old female   Provider:  Addy Frias MD   Department:  [facility]           INR as of 2/7/2018     Today's INR 3.02!      Anticoagulation Summary as of 2/7/2018     INR goal 2.0-3.0   Today's INR 3.02!   Full instructions 6 mg on Mon, Fri; 4 mg all other days   Next INR check 2/14/2018    Indications   Long-term (current) use of anticoagulants [Z79.01] [Z79.01]  Atrial fibrillation (H) [I48.91] (Resolved) [I48.91]         February 2018 Details    Sun Mon Tue Wed Thu Fri Sat         1               2               3                 4               5               6               7      4 mg   See details      8      4 mg         9      6 mg         10      4 mg           11      4 mg         12      6 mg         13      4 mg         14            15               16               17                 18               19               20               21               22               23               24                 25               26               27               28                   Date Details   02/07 This INR check       Date of next INR:  2/14/2018         How to take your warfarin dose     To take:  4 mg Take 1 of the 4 mg tablets.    To take:  6 mg Take 1.5 of the 4 mg tablets.

## 2018-02-07 NOTE — MR AVS SNAPSHOT
After Visit Summary   2/7/2018    Amira Arreola    MRN: [id number]           Patient Information     Date Of Birth          7/17/1932        Visit Information        Provider Department      2/7/2018 2:00 PM Esme Bone MD [facility]        Today's Diagnoses     At risk for falling    -  1    Rash and nonspecific skin eruption        Bilateral impacted cerumen          Care Instructions          Earwax, Home Treatment    Everyone produces earwax from the lining of the ear canal. It serves to lubricate and protect the ear. The wax that forms in the canal naturally moves toward the outside of the ear and falls out. Sometimes the ear canal may contain too much wax. This can cause a blockage and loss of hearing. Directions are given below for home treatment.  Home care  If your doctor has advised you to remove a wax blockage yourself, follow these directions:    Unless a medicine was prescribed, you may use an over-the-counter product made for clearing earwax. These contain carbamide peroxide. Lie down with the blocked ear facing upward. Apply one dropper full of medicine and wait a few minutes. Grasp the outer ear and wiggle it to help the solution enter the canal.    Lean over a sink or basin with the blocked ear facing downward. Use a bulb syringe filled with warm (not hot or cold) water to rinse the ear several times. Use gentle pressure only.    If you are having trouble draining the water out of your ear canal, put a few drops of rubbing alcohol (isopropyl alcohol) into the ear canal. This will help remove the remaining water.    Repeat this procedure once a day for up to three days, or until your hearing is back to normal. Do not use this treatment for more than three days in a row.  Don ts    Don t use cold water to rinse the ear. This will make you dizzy.    Don t perform this procedure if you have an ear infection.    Don t perform this procedure if you have a  ruptured eardrum.    Don t use cotton swabs, matches, hairpins, keys, or other objects to  clean  the ear canal. This can cause infection of the ear canal or rupture the eardrum. Because of their size and shape, cotton swabs can push earwax deeper into the ear canal instead of removing it.  Follow-up care  Follow up with your health care provider if you are not improving after three cleaning attempts, or as advised.  When to seek medical advice  Call your health care provider right away if any of these occur:    Worsening ear pain    Fever of 101 F (38.3 C) or higher, or as directed by your health care provider    Hearing does not return to normal after three days of treatment    Fluid drainage or bleeding from the ear canal    Swelling, redness, or tenderness of the outer ear    Headache, neck pain, or stiff neck    1079-5185 [employer]. [address] [place] [address]. All rights reserved. This information is not intended as a substitute for professional medical care. Always follow your healthcare professional's instructions.                Follow-ups after your visit        Follow-up notes from your care team     Return if symptoms worsen or fail to improve.      Your next 10 appointments already scheduled     Feb 07, 2018  2:00 PM CST   [place] Visit with Esme Bone [place]   [facility] [place] [address] [phone]           Bring a current list of meds and any records pertaining to this visit. For Physicals, please bring immunization records and any forms needing to be filled out. Please arrive 10 minutes early to complete paperwork.            Feb 14, 2018 11:30 AM CST   Level 2 with  INFUSION CHAIR 15   [facility] ([facility])    [facility]  [address]  [facility] [address]   [phone]            Feb  "21, 2018 10:30 AM CST   Level 2 with SH INFUSION CHAIR 13   [facility] ([facility])    [facility] [place]  [address]  [place] [address]   [phone]            Feb 28, 2018  1:30 PM CST   Level 2 with SH INFUSION CHAIR 12   [facility] ([facility])    [facility] [place]  [address]  [place] [address]   [phone]            Feb 28, 2018  2:00 PM CST   Return Visit with Shayne Roberts MD   [facility] ([facility])    [facility] [place]  [address]  [place] [address]   [phone]            Mar 06, 2018 10:15 AM CST   Return Visit with Ramos Rivera MD   [facility] ([facility])    [address]  [facility] [address] [phone]              Who to contact     If you have questions or need follow up information about today's clinic visit or your schedule please contact [facility] [place] directly at [phone].  Normal or non-critical lab and imaging results will be communicated to you by MyChart, letter or phone within 4 business days after the clinic has received the results. If you do not hear from us within 7 days, please contact the clinic through MyChart or phone. If you have a critical or abnormal lab result, we will notify you by phone as soon as possible.  Submit refill requests through [employer] or call your pharmacy and they will forward the refill request to us. Please allow 3 business days for your refill to be completed.          Additional Information About Your Visit        [employer] Information     [employer] lets you send messages to your doctor, view your test results, renew your prescriptions, schedule appointments and more. To sign up, go to [email or web address] . Click on \"Log in\" on the left side of " "the screen, which will take you to the Welcome page. Then click on \"Sign up Now\" on the right side of the page.     You will be asked to enter the access code listed below, as well as some personal information. Please follow the directions to create your username and password.     Your access code is: [email or web address]  Expires: 4/3/2018  3:04 PM     Your access code will  in 90 days. If you need help or a new code, please call your [facility] or [phone].        Care EveryWhere ID     This is your Care EveryWhere ID. This could be used by other organizations to access your [place] medical records  [id number]        Your Vitals Were     Pulse Temperature Respirations Height Pulse Oximetry BMI (Body Mass Index)    114 98.6  F (37  C) (Tympanic) 14 5' 5.5\" (1.664 m) 99% 23.6 kg/m2       Blood Pressure from Last 3 Encounters:   18 153/78   18 141/88   18 100/65    Weight from Last 3 Encounters:   18 144 lb (65.3 kg)   18 144 lb (65.3 kg)   18 142 lb 3.2 oz (64.5 kg)              Today, you had the following     No orders found for display         Today's Medication Changes          These changes are accurate as of 18  1:44 PM.  If you have any questions, ask your nurse or doctor.               Start taking these medicines.        Dose/Directions    triamcinolone 0.5 % cream   Commonly known as:  KENALOG   Used for:  Rash and nonspecific skin eruption   Started by:  Esme Bone MD        Apply sparingly to affected area three times daily. 1-2 weeks , as needed   Quantity:  15 g   Refills:  0            Where to get your medicines      These medications were sent to [id number] [place]  [address] [facility] [address]  [address], [place] [address]     Phone:  [phone]     triamcinolone 0.5 % cream                Primary Care Provider Office Phone # Fax #    Addy Frias [phone]       " [address]  [facility] [address]        Equal Access to Services     MICHAELHARINI GARCIA : Hadii liane ku nichol Galloway, wajanetda iselawillianha, dahlia christianrandykira riossanchokira, hung dawn erinmorteza maradiagaedgardoporfirio dominique . So [facility] [phone].    ATENCIÓN: Si habla español, tiene a barlow disposición servicios gratuitos de asistencia lingüística. Llame al [phone].    We comply with applicable federal civil rights laws and [place] laws. We do not discriminate on the basis of race, color, national origin, age, disability, sex, sexual orientation, or gender identity.            Thank you!     Thank you for choosing [facility] [place]  for your care. Our goal is always to provide you with excellent care. Hearing back from our patients is one way we can continue to improve our services. Please take a few minutes to complete the written survey that you may receive in the mail after your visit with us. Thank you!             Your Updated Medication List - Protect others around you: Learn how to safely use, store and throw away your medicines at www.disposemymeds.org.          This list is accurate as of 2/7/18  1:44 PM.  Always use your most recent med list.                   Brand Name Dispense Instructions for use Diagnosis    acyclovir 400 MG tablet    ZOVIRAX    60 tablet    Take 1 tablet (400 mg) by mouth 2 times daily Viral Prophylaxis.    Multiple myeloma not having achieved remission (H)       CALCIUM CITRATE + PO      Take 2,000 mg by mouth daily 2 tabs        carboxymethylcellulose 0.5 % Soln ophthalmic solution    REFRESH PLUS     1 drop 4 times daily        CLARINEX PO      Take by mouth daily Taking claritin        COMPAZINE PO      Take 10 mg by mouth daily as needed        cycloSPORINE 0.05 % ophthalmic emulsion    RESTASIS     Place 1 drop into both eyes every 12 hours        DAILY MULTIVITAMIN PO      Take 1 tablet by mouth daily.    Routine general medical examination at a health care facility       *  dexamethasone 4 MG tablet    DECADRON    28 tablet    Take 20mg (5 tablets) by mouth every week on the morning of velcade injection.    Multiple myeloma not having achieved remission (H)       * dexamethasone 4 MG tablet    DECADRON    28 tablet    Take 20mg (5 tablets) by mouth every week on the morning of velcade injection.    Multiple myeloma not having achieved remission (H)       erythromycin ophthalmic ointment    ROMYCIN     Place 1 Application into both eyes At Bedtime        GENTLE STOOL SOFTENER PO      Take 100 mg by mouth daily        lidocaine-prilocaine cream    EMLA    30 g    Apply topically as needed for moderate pain Apply dollop size amount to port site 30-60 min prior to accessing    Multiple myeloma not having achieved remission (H)       LORazepam 0.5 MG tablet    ATIVAN    30 tablet    Take 1 tablet (0.5 mg) by mouth every 8 hours as needed for anxiety    Multiple myeloma not having achieved remission (H)       metoprolol succinate 50 MG 24 hr tablet    TOPROL XL    270 tablet    Take 2 tablets (100mg) in the morning and 1 tablet (50mg) in the evening by mouth daily    Paroxysmal atrial fibrillation (H)       oxyCODONE IR 15 MG tablet    ROXICODONE    60 tablet    Take 1 tablet (15 mg) by mouth every 8 hours as needed for pain maximum 4 tablet(s) per day    Multiple myeloma not having achieved remission (H)       polyethylene glycol powder    MIRALAX/GLYCOLAX     Take 1 capful by mouth daily as needed    Bilateral leg edema       SIMBRINZA 1-0.2 % ophthalmic suspension   Generic drug:  brinzolamide-brimonidine      Place 1 drop into the right eye 2 times daily 1 drop AM and PM        triamcinolone 0.5 % cream    KENALOG    15 g    Apply sparingly to affected area three times daily. 1-2 weeks , as needed    Rash and nonspecific skin eruption       TYLENOL PO      Take 500 mg by mouth every 6 hours as needed for mild pain or fever        UNABLE TO FIND      MEDICATION NAME: Fresh Coat eye drops         VITAMIN D3 PO      Take 1,000 Units by mouth daily        warfarin 4 MG tablet    COUMADIN    110 tablet    TAKE ONE AND ONE-HALF TABLETS BY MOUTH ON MONDAY, WEDNESDAY, AND FRIDAY AND ONE TABLET THE OTHER DAYS OF THE WEEK    Paroxysmal atrial fibrillation (H), Long-term (current) use of anticoagulants       ZOMETA IV      Inject into the vein every 30 days Every 3 month dosing        * Notice:  This list has 2 medication(s) that are the same as other medications prescribed for you. Read the directions carefully, and ask your doctor or other care provider to review them with you.

## 2018-02-07 NOTE — MR AVS SNAPSHOT
After Visit Summary   2/7/2018    Amira Arreola    MRN: [id number]           Patient Information     Date Of Birth          7/17/1932        Visit Information        Provider Department      2/7/2018 11:00 AM  INFUSION CHAIR 14 [facility]        Today's Diagnoses     Multiple myeloma not having achieved remission (H)    -  1    Cancer, metastatic to bone (H)        Paroxysmal atrial fibrillation (H)        Portacath in place           Follow-ups after your visit        Your next 10 appointments already scheduled     Feb 08, 2018 11:00 AM CST   Office Visit with Esme Bone MD   [facility] ([address] [id number] [phone]           Bring a current list of meds and any records pertaining to this visit. For Physicals, please bring immunization records and any forms needing to be filled out. Please arrive 10 minutes early to complete paperwork.            Feb 14, 2018 11:30 AM CST   Level 2 with  INFUSION CHAIR 15   [facility] ([facility])    [facility]  [address]  [place] [address]   [phone]            Feb 21, 2018 10:30 AM CST   Level 2 with  INFUSION CHAIR 13   [facility] ([facility])    [facility]  [address]  [place] [address]   [phone]            Feb 28, 2018  1:30 PM CST   Level 2 with  INFUSION CHAIR 12   [facility] ([facility])    [facility]  [address]  [place] [address]   [phone]            Feb 28, 2018  2:00 PM CST   Return Visit with Shayne Roberts MD   [facility] ([facility])    [facility]  [address]  [place]  "[address]   [phone]            Mar 06, 2018 10:15 AM CST   Return Visit with Ramos Rivera MD   [facility] ([facility])    [address] [place], [place] [address]  [place] [address] [phone]              Who to contact     If you have questions or need follow up information about today's clinic visit or your schedule please contact [facility] directly at [phone].  Normal or non-critical lab and imaging results will be communicated to you by [employer], letter or phone within 4 business days after the clinic has received the results. If you do not hear from us within 7 days, please contact the clinic through [employer] or phone. If you have a critical or abnormal lab result, we will notify you by phone as soon as possible.  Submit refill requests through [employer] or call your pharmacy and they will forward the refill request to us. Please allow 3 business days for your refill to be completed.          Additional Information About Your Visit        [facility] Information     [employer] lets you send messages to your doctor, view your test results, renew your prescriptions, schedule appointments and more. To sign up, go to [email or web address] . Click on \"Log in\" on the left side of the screen, which will take you to the Welcome page. Then click on \"Sign up Now\" on the right side of the page.     You will be asked to enter the access code listed below, as well as some personal information. Please follow the directions to create your username and password.     Your access code is: [email or web address]  Expires: 4/3/2018  3:04 PM     Your access code will  in 90 days. If you need help or a new code, please call your [place] clinic or [phone].        Care EveryWhere ID     This is your Care EveryWhere ID. This could be used by other organizations to access your [place] medical records  [id number]        Your Vitals Were     Pulse " Temperature Respirations BMI (Body Mass Index)          105 97.6  F (36.4  C) (Oral) 16 23.25 kg/m2         Blood Pressure from Last 3 Encounters:   02/07/18 141/88   01/31/18 100/65   01/31/18 100/65    Weight from Last 3 Encounters:   02/07/18 65.3 kg (144 lb)   01/31/18 64.5 kg (142 lb 3.2 oz)   01/31/18 64.5 kg (142 lb 3.2 oz)              We Performed the Following     Albumin level     Calcium     CBC with platelets differential     Creatinine     INR        Primary Care Provider Office Phone # Fax #    Addy Frias [phone] [address] [facility] [address]  [place] [address]        Equal Access to Services     SARA ZELAYA : Hadii liane crawfordo Soyair, waaxda luqadaha, qaybta kaalmada adeegyada, hung dominique . So [facility] [phone].    ATENCIÓN: Si habla español, tiene a barlow disposición servicios gratuitos de asistencia lingüística. [facility] [phone].    We comply with applicable federal civil rights laws and [place] laws. We do not discriminate on the basis of race, color, national origin, age, disability, sex, sexual orientation, or gender identity.            Thank you!     Thank you for choosing [facility]  for your care. Our goal is always to provide you with excellent care. Hearing back from our patients is one way we can continue to improve our services. Please take a few minutes to complete the written survey that you may receive in the mail after your visit with us. Thank you!             Your Updated Medication List - Protect others around you: Learn how to safely use, store and throw away your medicines at www.disposemymeds.org.          This list is accurate as of 2/7/18 12:29 PM.  Always use your most recent med list.                   Brand Name Dispense Instructions for use Diagnosis    acyclovir 400 MG tablet    ZOVIRAX    60 tablet    Take 1 tablet (400 mg) by mouth 2 times daily Viral Prophylaxis.     Multiple myeloma not having achieved remission (H)       CALCIUM CITRATE + PO      Take 2,000 mg by mouth daily 2 tabs        carboxymethylcellulose 0.5 % Soln ophthalmic solution    REFRESH PLUS     1 drop 4 times daily        CLARINEX PO      Take by mouth daily Taking claritin        COMPAZINE PO      Take 10 mg by mouth daily as needed        cycloSPORINE 0.05 % ophthalmic emulsion    RESTASIS     Place 1 drop into both eyes every 12 hours        DAILY MULTIVITAMIN PO      Take 1 tablet by mouth daily.    Routine general medical examination at a health care facility       * dexamethasone 4 MG tablet    DECADRON    28 tablet    Take 20mg (5 tablets) by mouth every week on the morning of velcade injection.    Multiple myeloma not having achieved remission (H)       * dexamethasone 4 MG tablet    DECADRON    28 tablet    Take 20mg (5 tablets) by mouth every week on the morning of velcade injection.    Multiple myeloma not having achieved remission (H)       erythromycin ophthalmic ointment    ROMYCIN     Place 1 Application into both eyes At Bedtime        GENTLE STOOL SOFTENER PO      Take 100 mg by mouth daily        lidocaine-prilocaine cream    EMLA    30 g    Apply topically as needed for moderate pain Apply dollop size amount to port site 30-60 min prior to accessing    Multiple myeloma not having achieved remission (H)       LORazepam 0.5 MG tablet    ATIVAN    30 tablet    Take 1 tablet (0.5 mg) by mouth every 8 hours as needed for anxiety    Multiple myeloma not having achieved remission (H)       metoprolol succinate 50 MG 24 hr tablet    TOPROL XL    270 tablet    Take 2 tablets (100mg) in the morning and 1 tablet (50mg) in the evening by mouth daily    Paroxysmal atrial fibrillation (H)       oxyCODONE IR 15 MG tablet    ROXICODONE    60 tablet    Take 1 tablet (15 mg) by mouth every 8 hours as needed for pain maximum 4 tablet(s) per day    Multiple myeloma not having achieved remission (H)        polyethylene glycol powder    MIRALAX/GLYCOLAX     Take 1 capful by mouth daily as needed    Bilateral leg edema       SIMBRINZA 1-0.2 % ophthalmic suspension   Generic drug:  brinzolamide-brimonidine      Place 1 drop into the right eye 2 times daily 1 drop AM and PM        TYLENOL PO      Take 500 mg by mouth every 6 hours as needed for mild pain or fever        UNABLE TO FIND      MEDICATION NAME: Fresh Coat eye drops        VITAMIN D3 PO      Take 1,000 Units by mouth daily        warfarin 4 MG tablet    COUMADIN    110 tablet    TAKE ONE AND ONE-HALF TABLETS BY MOUTH ON MONDAY, WEDNESDAY, AND FRIDAY AND ONE TABLET THE OTHER DAYS OF THE WEEK    Paroxysmal atrial fibrillation (H), Long-term (current) use of anticoagulants       ZOMETA IV      Inject into the vein every 30 days Every 3 month dosing        * Notice:  This list has 2 medication(s) that are the same as other medications prescribed for you. Read the directions carefully, and ask your doctor or other care provider to review them with you.

## 2018-02-07 NOTE — PATIENT INSTRUCTIONS
Earwax, Home Treatment    Everyone produces earwax from the lining of the ear canal. It serves to lubricate and protect the ear. The wax that forms in the canal naturally moves toward the outside of the ear and falls out. Sometimes the ear canal may contain too much wax. This can cause a blockage and loss of hearing. Directions are given below for home treatment.  Home care  If your doctor has advised you to remove a wax blockage yourself, follow these directions:    Unless a medicine was prescribed, you may use an over-the-counter product made for clearing earwax. These contain carbamide peroxide. Lie down with the blocked ear facing upward. Apply one dropper full of medicine and wait a few minutes. Grasp the outer ear and wiggle it to help the solution enter the canal.    Lean over a sink or basin with the blocked ear facing downward. Use a bulb syringe filled with warm (not hot or cold) water to rinse the ear several times. Use gentle pressure only.    If you are having trouble draining the water out of your ear canal, put a few drops of rubbing alcohol (isopropyl alcohol) into the ear canal. This will help remove the remaining water.    Repeat this procedure once a day for up to three days, or until your hearing is back to normal. Do not use this treatment for more than three days in a row.  Don ts    Don t use cold water to rinse the ear. This will make you dizzy.    Don t perform this procedure if you have an ear infection.    Don t perform this procedure if you have a ruptured eardrum.    Don t use cotton swabs, matches, hairpins, keys, or other objects to  clean  the ear canal. This can cause infection of the ear canal or rupture the eardrum. Because of their size and shape, cotton swabs can push earwax deeper into the ear canal instead of removing it.  Follow-up care  Follow up with your health care provider if you are not improving after three cleaning attempts, or as advised.  When to seek medical  advice  Call your health care provider right away if any of these occur:    Worsening ear pain    Fever of 101 F (38.3 C) or higher, or as directed by your health care provider    Hearing does not return to normal after three days of treatment    Fluid drainage or bleeding from the ear canal    Swelling, redness, or tenderness of the outer ear    Headache, neck pain, or stiff neck    5667-7090 [employer]. [address] [place] [address]. All rights reserved. This information is not intended as a substitute for professional medical care. Always follow your healthcare professional's instructions.

## 2018-02-07 NOTE — PROGRESS NOTES
ANTICOAGULATION FOLLOW-UP CLINIC VISIT    Patient Name:  Amira Arreola  Date:  2/7/2018  Contact Type:  Telephone/ Left detailed VM for patient - continue same dose, recheck 1 wk    SUBJECTIVE:     Patient Findings     Positives No Problem Findings           OBJECTIVE    INR   Date Value Ref Range Status   02/07/2018 3.02 (H) 0.86 - 1.14 Final       ASSESSMENT / PLAN  INR assessment THER    Recheck INR In: 1 WEEK    INR Location Clinic      Anticoagulation Summary as of 2/7/2018     INR goal 2.0-3.0   Today's INR 3.02!   Maintenance plan 6 mg (4 mg x 1.5) on Mon, Fri; 4 mg (4 mg x 1) all other days   Full instructions 6 mg on Mon, Fri; 4 mg all other days   Weekly total 32 mg   No change documented Michelle Pinon RN   Plan last modified Tigist Corral RN (1/10/2018)   Next INR check 2/14/2018   Target end date Indefinite    Indications   Long-term (current) use of anticoagulants [Z79.01] [Z79.01]  Atrial fibrillation (H) [I48.91] (Resolved) [I48.91]         Anticoagulation Episode Summary     INR check location     Preferred lab     Send INR reminders to [facility] ANT[facility]AGUL[facility]    Comments patient have standing INR order to be draw at infusion visit.  advise to call [facility] when have INR draw for dosing instruction.  patient can be reach at home phone [phone]      Anticoagulation Care Providers     Provider Role Specialty Phone number    Addy Frias MD Responsible Internal Medicine [phone]            See the Encounter Report to view Anticoagulation Flowsheet and Dosing Calendar (Go to Encounters tab in chart review, and find the Anticoagulation Therapy Visit)    Dosage adjustment made based on physician directed care plan.    Michelle Pinon RN

## 2018-02-07 NOTE — PROGRESS NOTES
Infusion Nursing Note:  Amira Arreola presents today for Velcade C9D22 and Zometa.    Patient seen by provider today: No   present during visit today: Not Applicable.    Note: Decreased hearing. Patient states she can't even hear the toilet flush. She is going to try and move her appointment up to today.    Intravenous Access:  Labs drawn without difficulty.  Implanted Port.    Treatment Conditions:  Lab Results   Component Value Date    HGB 12.3 02/07/2018     Lab Results   Component Value Date    WBC 7.7 02/07/2018      Lab Results   Component Value Date    ANEU 7.4 02/07/2018     Lab Results   Component Value Date     02/07/2018        Lab Results   Component Value Date    CR 0.53 02/07/2018                   Lab Results   Component Value Date    BRADLEY 9.4 02/07/2018          Lab Results   Component Value Date    ALBUMIN 3.3 02/07/2018    Results reviewed, labs MET treatment parameters, ok to proceed with treatment.  Corrected calcium: 9.96    Post Infusion Assessment:  Patient tolerated infusion without incident.  Patient tolerated injection without incident.  Site patent and intact, free from redness, edema or discomfort.  No evidence of extravasations.  Access discontinued per protocol.    Discharge Plan:   Discharge instructions reviewed with: Patient.  Patient and/or family verbalized understanding of discharge instructions and all questions answered.  Copy of AVS reviewed with patient and/or family.  Patient will return 2/14/2018 for next appointment.  Patient discharged in stable condition accompanied by: self.  Departure Mode: Ambulatory.    Caitie Josue RN

## 2018-02-14 ENCOUNTER — INFUSION THERAPY VISIT (OUTPATIENT)
Dept: INFUSION THERAPY | Facility: CLINIC | Age: 83
End: 2018-02-14
Attending: INTERNAL MEDICINE
Payer: MEDICARE

## 2018-02-14 ENCOUNTER — HOSPITAL ENCOUNTER (OUTPATIENT)
Facility: CLINIC | Age: 83
Setting detail: SPECIMEN
Discharge: HOME | End: 2018-02-14
Attending: INTERNAL MEDICINE | Admitting: INTERNAL MEDICINE
Payer: MEDICARE

## 2018-02-14 VITALS
RESPIRATION RATE: 20 BRPM | HEART RATE: 98 BPM | DIASTOLIC BLOOD PRESSURE: 91 MMHG | BODY MASS INDEX: 23.14 KG/M2 | SYSTOLIC BLOOD PRESSURE: 146 MMHG | OXYGEN SATURATION: 98 % | TEMPERATURE: 97.3 F | WEIGHT: 144 LBS | HEIGHT: 66 IN

## 2018-02-14 DIAGNOSIS — C90.00 MULTIPLE MYELOMA NOT HAVING ACHIEVED REMISSION (H): Primary | ICD-10-CM

## 2018-02-14 DIAGNOSIS — I48.0 PAROXYSMAL ATRIAL FIBRILLATION (H): ICD-10-CM

## 2018-02-14 LAB
ALBUMIN SERPL-MCNC: 3.4 G/DL (ref 3.4–5)
ALP SERPL-CCNC: 65 U/L (ref 40–150)
ALT SERPL W P-5'-P-CCNC: 29 U/L (ref 0–50)
AST SERPL W P-5'-P-CCNC: 24 U/L (ref 0–45)
BASOPHILS # BLD AUTO: 0 10E9/L (ref 0–0.2)
BASOPHILS NFR BLD AUTO: 0.1 %
BILIRUB DIRECT SERPL-MCNC: <0.1 MG/DL (ref 0–0.2)
BILIRUB SERPL-MCNC: 0.4 MG/DL (ref 0.2–1.3)
DIFFERENTIAL METHOD BLD: ABNORMAL
EOSINOPHIL # BLD AUTO: 0 10E9/L (ref 0–0.7)
EOSINOPHIL NFR BLD AUTO: 0.4 %
ERYTHROCYTE [DISTWIDTH] IN BLOOD BY AUTOMATED COUNT: 14.6 % (ref 10–15)
HCT VFR BLD AUTO: 37.2 % (ref 35–47)
HGB BLD-MCNC: 12.3 G/DL (ref 11.7–15.7)
IMM GRANULOCYTES # BLD: 0 10E9/L (ref 0–0.4)
IMM GRANULOCYTES NFR BLD: 0.2 %
INR PPP: 2.91 (ref 0.86–1.14)
LYMPHOCYTES # BLD AUTO: 0.4 10E9/L (ref 0.8–5.3)
LYMPHOCYTES NFR BLD AUTO: 5.1 %
MCH RBC QN AUTO: 33.1 PG (ref 26.5–33)
MCHC RBC AUTO-ENTMCNC: 33.1 G/DL (ref 31.5–36.5)
MCV RBC AUTO: 100 FL (ref 78–100)
MONOCYTES # BLD AUTO: 0.3 10E9/L (ref 0–1.3)
MONOCYTES NFR BLD AUTO: 3.4 %
NEUTROPHILS # BLD AUTO: 7.8 10E9/L (ref 1.6–8.3)
NEUTROPHILS NFR BLD AUTO: 90.8 %
NRBC # BLD AUTO: 0 10*3/UL
NRBC BLD AUTO-RTO: 0 /100
PLATELET # BLD AUTO: 155 10E9/L (ref 150–450)
PROT SERPL-MCNC: 6.2 G/DL (ref 6.8–8.8)
RBC # BLD AUTO: 3.72 10E12/L (ref 3.8–5.2)
WBC # BLD AUTO: 8.6 10E9/L (ref 4–11)

## 2018-02-14 PROCEDURE — 96401 CHEMO ANTI-NEOPL SQ/IM: CPT

## 2018-02-14 PROCEDURE — A9270 NON-COVERED ITEM OR SERVICE: HCPCS | Mod: GY | Performed by: INTERNAL MEDICINE

## 2018-02-14 PROCEDURE — 00000402 ZZHCL STATISTIC TOTAL PROTEIN: Performed by: INTERNAL MEDICINE

## 2018-02-14 PROCEDURE — 96415 CHEMO IV INFUSION ADDL HR: CPT

## 2018-02-14 PROCEDURE — 85025 COMPLETE CBC W/AUTO DIFF WBC: CPT | Performed by: INTERNAL MEDICINE

## 2018-02-14 PROCEDURE — 25000132 ZZH RX MED GY IP 250 OP 250 PS 637: Mod: GY | Performed by: INTERNAL MEDICINE

## 2018-02-14 PROCEDURE — 25000128 H RX IP 250 OP 636: Performed by: INTERNAL MEDICINE

## 2018-02-14 PROCEDURE — 96375 TX/PRO/DX INJ NEW DRUG ADDON: CPT

## 2018-02-14 PROCEDURE — 84165 PROTEIN E-PHORESIS SERUM: CPT | Performed by: INTERNAL MEDICINE

## 2018-02-14 PROCEDURE — 83883 ASSAY NEPHELOMETRY NOT SPEC: CPT | Performed by: INTERNAL MEDICINE

## 2018-02-14 PROCEDURE — 85610 PROTHROMBIN TIME: CPT | Performed by: INTERNAL MEDICINE

## 2018-02-14 PROCEDURE — 80076 HEPATIC FUNCTION PANEL: CPT | Performed by: INTERNAL MEDICINE

## 2018-02-14 PROCEDURE — 96413 CHEMO IV INFUSION 1 HR: CPT

## 2018-02-14 RX ORDER — DEXAMETHASONE 4 MG/1
TABLET ORAL
Qty: 28 TABLET | Refills: 0 | Status: SHIPPED | OUTPATIENT
Start: 2018-02-14 | End: 2018-02-21

## 2018-02-14 RX ORDER — DIPHENHYDRAMINE HCL 25 MG
50 CAPSULE ORAL ONCE
Status: COMPLETED | OUTPATIENT
Start: 2018-02-14 | End: 2018-02-14

## 2018-02-14 RX ORDER — HEPARIN SODIUM (PORCINE) LOCK FLUSH IV SOLN 100 UNIT/ML 100 UNIT/ML
5 SOLUTION INTRAVENOUS EVERY 8 HOURS
Status: DISCONTINUED | OUTPATIENT
Start: 2018-02-14 | End: 2018-02-14 | Stop reason: HOSPADM

## 2018-02-14 RX ORDER — ACETAMINOPHEN 325 MG/1
650 TABLET ORAL ONCE
Status: COMPLETED | OUTPATIENT
Start: 2018-02-14 | End: 2018-02-14

## 2018-02-14 RX ADMIN — DEXAMETHASONE SODIUM PHOSPHATE 12 MG: 10 INJECTION, SOLUTION INTRAMUSCULAR; INTRAVENOUS at 12:34

## 2018-02-14 RX ADMIN — BORTEZOMIB 2.2 MG: 3.5 INJECTION, POWDER, LYOPHILIZED, FOR SOLUTION INTRAVENOUS; SUBCUTANEOUS at 14:37

## 2018-02-14 RX ADMIN — ACETAMINOPHEN 650 MG: 325 TABLET ORAL at 12:35

## 2018-02-14 RX ADMIN — DIPHENHYDRAMINE HYDROCHLORIDE 50 MG: 25 CAPSULE ORAL at 12:35

## 2018-02-14 RX ADMIN — SODIUM CHLORIDE 250 ML: 9 INJECTION, SOLUTION INTRAVENOUS at 12:33

## 2018-02-14 RX ADMIN — DARATUMUMAB 1000 MG: 100 INJECTION, SOLUTION, CONCENTRATE INTRAVENOUS at 12:48

## 2018-02-14 NOTE — MR AVS SNAPSHOT
After Visit Summary   2/14/2018    Amira Arreola    MRN: [id number]           Patient Information     Date Of Birth          7/17/1932        Visit Information        Provider Department      2/14/2018 11:30 AM  INFUSION CHAIR 15 [facility]        Today's Diagnoses     Multiple myeloma not having achieved remission (H)    -  1    Paroxysmal atrial fibrillation (H)           Follow-ups after your visit        Your next 10 appointments already scheduled     Feb 21, 2018 10:30 AM CST   Level 2 with  INFUSION CHAIR 13   [facility] ([facility])    [facility]  [address]  [place] [address]   [phone]            Feb 28, 2018  1:30 PM CST   Level 2 with  INFUSION CHAIR 12   [facility] ([facility])    [facility]  [address]  [place] [address]   [phone]            Feb 28, 2018  2:00 PM CST   Return Visit with Shayen Roberts MD   [facility] ([facility])    [facility]  [address]  [place] [address]   [phone]            Mar 06, 2018 10:15 AM CST   Return Visit with Ramos Rivera MD   [facility] ([facility]    [address]  [place] [address]   [phone]              Who to contact     If you have questions or need follow up information about today's clinic visit or your schedule please contact [facility] directly at [phone].  Normal or non-critical lab and imaging results will be communicated to you by MyChart, letter or phone within 4 business days after the clinic has received the results. If you do not hear from us within 7 days, please contact the clinic through MyChart or phone. If you have a critical or  "abnormal lab result, we will notify you by phone as soon as possible.  Submit refill requests through [employer] or call your pharmacy and they will forward the refill request to us. Please allow 3 business days for your refill to be completed.          Additional Information About Your Visit        [employer] Information     [employer] lets you send messages to your doctor, view your test results, renew your prescriptions, schedule appointments and more. To sign up, go to [email or web address] . Click on \"Log in\" on the left side of the screen, which will take you to the Welcome page. Then click on \"Sign up Now\" on the right side of the page.     You will be asked to enter the access code listed below, as well as some personal information. Please follow the directions to create your username and password.     Your access code is: [email or web address]  Expires: 4/3/2018  3:04 PM     Your access code will  in 90 days. If you need help or a new code, please call your [facility] clinic or [phone].        Care EveryWhere ID     This is your Care EveryWhere ID. This could be used by other organizations to access your [facility] medical records  [id number]        Your Vitals Were     Pulse Temperature Respirations Height Pulse Oximetry BMI (Body Mass Index)    98 97.3  F (36.3  C) (Oral) 20 1.664 m (5' 5.51\") 98% 23.59 kg/m2       Blood Pressure from Last 3 Encounters:   18 (!) 146/91   18 153/78   18 141/88    Weight from Last 3 Encounters:   18 65.3 kg (144 lb)   18 65.3 kg (144 lb)   18 65.3 kg (144 lb)              We Performed the Following     CBC with platelets differential     Hepatic panel     INR     Kappa and lambda light chain     Protein electrophoresis          Today's Medication Changes          These changes are accurate as of 18  4:16 PM.  If you have any questions, ask your nurse or doctor.               These medicines have changed or have updated prescriptions.        " Dose/Directions    * dexamethasone 4 MG tablet   Commonly known as:  DECADRON   This may have changed:  Another medication with the same name was added. Make sure you understand how and when to take each.   Used for:  Multiple myeloma not having achieved remission (H)        Take 20mg (5 tablets) by mouth every week on the morning of velcade injection.   Quantity:  28 tablet   Refills:  0       * dexamethasone 4 MG tablet   Commonly known as:  DECADRON   This may have changed:  Another medication with the same name was added. Make sure you understand how and when to take each.   Used for:  Multiple myeloma not having achieved remission (H)        Take 20mg (5 tablets) by mouth every week on the morning of velcade injection.   Quantity:  28 tablet   Refills:  0       * dexamethasone 4 MG tablet   Commonly known as:  DECADRON   This may have changed:  You were already taking a medication with the same name, and this prescription was added. Make sure you understand how and when to take each.   Used for:  Multiple myeloma not having achieved remission (H)        Take 20mg (5 tablets) by mouth every week on the morning of velcade injection.   Quantity:  28 tablet   Refills:  0       * Notice:  This list has 3 medication(s) that are the same as other medications prescribed for you. Read the directions carefully, and ask your doctor or other care provider to review them with you.         Where to get your medicines      These medications were sent to [facility] [place] - [place], [place] - [address]  [address], [facility] [address]     Phone:  [phone]     dexamethasone 4 MG tablet                Primary Care Provider Office Phone # Fax #    Addy Frias [phone] [address]  [facility] [address]        Equal Access to Services     SARA ZLEAYA AH: Gissel Galloway, rhonda gutierrez, hung albert  laguera sadler. So [facility] [phone].    ATENCIÓN: Si feli park, tiene a barlow disposición servicios gratuitos de asistencia lingüística. Kadie eller [phone].    We comply with applicable federal civil rights laws and [place] laws. We do not discriminate on the basis of race, color, national origin, age, disability, sex, sexual orientation, or gender identity.            Thank you!     Thank you for choosing [facility]  for your care. Our goal is always to provide you with excellent care. Hearing back from our patients is one way we can continue to improve our services. Please take a few minutes to complete the written survey that you may receive in the mail after your visit with us. Thank you!             Your Updated Medication List - Protect others around you: Learn how to safely use, store and throw away your medicines at www.disposemymeds.org.          This list is accurate as of 2/14/18  4:16 PM.  Always use your most recent med list.                   Brand Name Dispense Instructions for use Diagnosis    acyclovir 400 MG tablet    ZOVIRAX    60 tablet    Take 1 tablet (400 mg) by mouth 2 times daily Viral Prophylaxis.    Multiple myeloma not having achieved remission (H)       CALCIUM CITRATE + PO      Take 2,000 mg by mouth daily 2 tabs        carboxymethylcellulose 0.5 % Soln ophthalmic solution    REFRESH PLUS     1 drop 4 times daily        CLARINEX PO      Take by mouth daily Taking claritin        COMPAZINE PO      Take 10 mg by mouth daily as needed        cycloSPORINE 0.05 % ophthalmic emulsion    RESTASIS     Place 1 drop into both eyes every 12 hours        DAILY MULTIVITAMIN PO      Take 1 tablet by mouth daily.    Routine general medical examination at a health care facility       * dexamethasone 4 MG tablet    DECADRON    28 tablet    Take 20mg (5 tablets) by mouth every week on the morning of velcade injection.    Multiple myeloma not having achieved remission (H)        * dexamethasone 4 MG tablet    DECADRON    28 tablet    Take 20mg (5 tablets) by mouth every week on the morning of velcade injection.    Multiple myeloma not having achieved remission (H)       * dexamethasone 4 MG tablet    DECADRON    28 tablet    Take 20mg (5 tablets) by mouth every week on the morning of velcade injection.    Multiple myeloma not having achieved remission (H)       erythromycin ophthalmic ointment    ROMYCIN     Place 1 Application into both eyes At Bedtime        GENTLE STOOL SOFTENER PO      Take 100 mg by mouth daily        lidocaine-prilocaine cream    EMLA    30 g    Apply topically as needed for moderate pain Apply dollop size amount to port site 30-60 min prior to accessing    Multiple myeloma not having achieved remission (H)       LORazepam 0.5 MG tablet    ATIVAN    30 tablet    Take 1 tablet (0.5 mg) by mouth every 8 hours as needed for anxiety    Multiple myeloma not having achieved remission (H)       metoprolol succinate 50 MG 24 hr tablet    TOPROL XL    270 tablet    Take 2 tablets (100mg) in the morning and 1 tablet (50mg) in the evening by mouth daily    Paroxysmal atrial fibrillation (H)       oxyCODONE IR 15 MG tablet    ROXICODONE    60 tablet    Take 1 tablet (15 mg) by mouth every 8 hours as needed for pain maximum 4 tablet(s) per day    Multiple myeloma not having achieved remission (H)       polyethylene glycol powder    MIRALAX/GLYCOLAX     Take 1 capful by mouth daily as needed    Bilateral leg edema       SIMBRINZA 1-0.2 % ophthalmic suspension   Generic drug:  brinzolamide-brimonidine      Place 1 drop into the right eye 2 times daily 1 drop AM and PM        triamcinolone 0.5 % cream    KENALOG    15 g    Apply sparingly to affected area three times daily. 1-2 weeks , as needed    Rash and nonspecific skin eruption       TYLENOL PO      Take 500 mg by mouth every 6 hours as needed for mild pain or fever        UNABLE TO FIND      MEDICATION NAME: Fresh Coat  eye drops        VITAMIN D3 PO      Take 1,000 Units by mouth daily        warfarin 4 MG tablet    COUMADIN    110 tablet    TAKE ONE AND ONE-HALF TABLETS BY MOUTH ON MONDAY, WEDNESDAY, AND FRIDAY AND ONE TABLET THE OTHER DAYS OF THE WEEK    Paroxysmal atrial fibrillation (H), Long-term (current) use of anticoagulants       ZOMETA IV      Inject into the vein every 30 days Every 3 month dosing        * Notice:  This list has 3 medication(s) that are the same as other medications prescribed for you. Read the directions carefully, and ask your doctor or other care provider to review them with you.

## 2018-02-14 NOTE — PROGRESS NOTES
Infusion Nursing Note:  Amira Arreola presents today for Cycle 10 Day 1 Darzalex, Velcade.    Patient seen by provider today: No   present during visit today: Not Applicable.    Note: N/A.    Intravenous Access:  Implanted Port.    Treatment Conditions:  Lab Results   Component Value Date    HGB 12.3 02/14/2018     Lab Results   Component Value Date    WBC 8.6 02/14/2018      Lab Results   Component Value Date    ANEU 7.8 02/14/2018     Lab Results   Component Value Date     02/14/2018      Lab Results   Component Value Date     06/13/2017                   Lab Results   Component Value Date    POTASSIUM 4.0 06/13/2017           No results found for: MAG         Lab Results   Component Value Date    CR 0.53 02/07/2018                   Lab Results   Component Value Date    BRADLEY 9.4 02/07/2018                Lab Results   Component Value Date    BILITOTAL 0.4 02/14/2018           Lab Results   Component Value Date    ALBUMIN 3.4 02/14/2018                    Lab Results   Component Value Date    ALT 29 02/14/2018           Lab Results   Component Value Date    AST 24 02/14/2018       Results reviewed, labs MET treatment parameters, ok to proceed with treatment.      Post Infusion Assessment:  Patient tolerated infusion without incident.  Patient tolerated injection without incident.  Blood return noted pre and post infusion.  Site patent and intact, free from redness, edema or discomfort.  No evidence of extravasations.  Access discontinued per protocol.    Discharge Plan:   Patient declined prescription refills.  Discharge instructions reviewed with: Patient.  Patient verbalized understanding of discharge instructions and all questions answered.  Copy of AVS reviewed with patient.  Patient will return 2/21/18 for next appointment.  Patient discharged in stable condition accompanied by: self.  Departure Mode: Ambulatory.    Senia Juan RN

## 2018-02-15 LAB
ALBUMIN SERPL ELPH-MCNC: 3.7 G/DL (ref 3.7–5.1)
ALPHA1 GLOB SERPL ELPH-MCNC: 0.4 G/DL (ref 0.2–0.4)
ALPHA2 GLOB SERPL ELPH-MCNC: 0.8 G/DL (ref 0.5–0.9)
B-GLOBULIN SERPL ELPH-MCNC: 0.7 G/DL (ref 0.6–1)
GAMMA GLOB SERPL ELPH-MCNC: 0.3 G/DL (ref 0.7–1.6)
KAPPA LC UR-MCNC: 2.73 MG/DL (ref 0.33–1.94)
KAPPA LC/LAMBDA SER: 3.96 {RATIO} (ref 0.26–1.65)
LAMBDA LC SERPL-MCNC: 0.69 MG/DL (ref 0.57–2.63)
M PROTEIN SERPL ELPH-MCNC: 0.1 G/DL
PROT PATTERN SERPL ELPH-IMP: ABNORMAL

## 2018-02-19 ENCOUNTER — ANTICOAGULATION THERAPY VISIT (OUTPATIENT)
Dept: NURSING | Facility: CLINIC | Age: 83
End: 2018-02-19
Payer: COMMERCIAL

## 2018-02-19 DIAGNOSIS — Z79.01 LONG-TERM (CURRENT) USE OF ANTICOAGULANTS: ICD-10-CM

## 2018-02-19 NOTE — MR AVS SNAPSHOT
Amira Arreola   2/19/2018   Anticoagulation Therapy Visit    Description:  85 year old female   Provider:  Addy Frias MD   Department:  [facility]           INR as of 2/19/2018     Today's INR 2.91 (2/14/2018)      Anticoagulation Summary as of 2/19/2018     INR goal 2.0-3.0   Today's INR 2.91 (2/14/2018)   Full instructions 6 mg on Mon, Fri; 4 mg all other days   Next INR check 2/21/2018    Indications   Long-term (current) use of anticoagulants [Z79.01] [Z79.01]  Atrial fibrillation (H) [I48.91] (Resolved) [I48.91]         Contact Numbers     Clinic Number:         February 2018 Details    Sun Mon Tue Wed Thu Fri Sat         1               2               3                 4               5               6               7               8               9               10                 11               12               13               14               15               16               17                 18               19      6 mg   See details      20      4 mg         21            22               23               24                 25               26               27               28                   Date Details   02/19 This INR check       Date of next INR:  2/21/2018         How to take your warfarin dose     To take:  4 mg Take 1 of the 4 mg tablets.    To take:  6 mg Take 1.5 of the 4 mg tablets.

## 2018-02-19 NOTE — PROGRESS NOTES
ANTICOAGULATION FOLLOW-UP CLINIC VISIT    Patient Name:  Amira Arreola  Date:  2/19/2018  Contact Type:  Telephone/ Spoke with patient on the phone regarding INR that was drawn 2/14/18. Patient plans to have INR drawn at Infusion Center every Wednesday.     SUBJECTIVE:        OBJECTIVE    INR   Date Value Ref Range Status   02/14/2018 2.91 (H) 0.86 - 1.14 Final       ASSESSMENT / PLAN  INR assessment THER    Recheck INR In: 1 WEEK    INR Location Outside lab      Anticoagulation Summary as of 2/19/2018     INR goal 2.0-3.0   Today's INR 2.91 (2/14/2018)   Maintenance plan 6 mg (4 mg x 1.5) on Mon, Fri; 4 mg (4 mg x 1) all other days   Full instructions 6 mg on Mon, Fri; 4 mg all other days   Weekly total 32 mg   No change documented Keyla Justice, SHELLIE   Plan last modified Tigist Corral RN (1/10/2018)   Next INR check 2/21/2018   Target end date Indefinite    Indications   Long-term (current) use of anticoagulants [Z79.01] [Z79.01]  Atrial fibrillation (H) [I48.91] (Resolved) [I48.91]         Anticoagulation Episode Summary     INR check location     Preferred lab     Send INR reminders to [facility] ANTICOAGULATION    Comments patient have standing INR order to be draw at infusion visit.  advise to call [facility] when have INR draw for dosing instruction.  patient can be reach at home phone [phone]      Anticoagulation Care Providers     Provider Role Specialty Phone number    Addy Frias MD [facility] Internal Medicine [phone]            See the Encounter Report to view Anticoagulation Flowsheet and Dosing Calendar (Go to Encounters tab in chart review, and find the Anticoagulation Therapy Visit)    Dosage adjustment made based on physician directed care plan.      Keyla Justice, RN

## 2018-02-21 ENCOUNTER — TELEPHONE (OUTPATIENT)
Dept: INFUSION THERAPY | Facility: CLINIC | Age: 83
End: 2018-02-21

## 2018-02-21 ENCOUNTER — HOSPITAL ENCOUNTER (OUTPATIENT)
Facility: CLINIC | Age: 83
Setting detail: SPECIMEN
Discharge: HOME | End: 2018-02-21
Attending: INTERNAL MEDICINE | Admitting: INTERNAL MEDICINE
Payer: MEDICARE

## 2018-02-21 ENCOUNTER — INFUSION THERAPY VISIT (OUTPATIENT)
Dept: INFUSION THERAPY | Facility: CLINIC | Age: 83
End: 2018-02-21
Attending: INTERNAL MEDICINE
Payer: MEDICARE

## 2018-02-21 ENCOUNTER — ANTICOAGULATION THERAPY VISIT (OUTPATIENT)
Dept: FAMILY MEDICINE | Facility: CLINIC | Age: 83
End: 2018-02-21
Payer: COMMERCIAL

## 2018-02-21 VITALS
SYSTOLIC BLOOD PRESSURE: 141 MMHG | HEIGHT: 66 IN | WEIGHT: 143.6 LBS | DIASTOLIC BLOOD PRESSURE: 91 MMHG | RESPIRATION RATE: 16 BRPM | BODY MASS INDEX: 23.08 KG/M2 | HEART RATE: 100 BPM | TEMPERATURE: 97.5 F | OXYGEN SATURATION: 97 %

## 2018-02-21 DIAGNOSIS — I48.0 PAROXYSMAL ATRIAL FIBRILLATION (H): ICD-10-CM

## 2018-02-21 DIAGNOSIS — Z79.01 LONG-TERM (CURRENT) USE OF ANTICOAGULANTS: ICD-10-CM

## 2018-02-21 DIAGNOSIS — C90.00 MULTIPLE MYELOMA NOT HAVING ACHIEVED REMISSION (H): Primary | ICD-10-CM

## 2018-02-21 LAB
BASOPHILS # BLD AUTO: 0 10E9/L (ref 0–0.2)
BASOPHILS NFR BLD AUTO: 0 %
DIFFERENTIAL METHOD BLD: ABNORMAL
EOSINOPHIL # BLD AUTO: 0 10E9/L (ref 0–0.7)
EOSINOPHIL NFR BLD AUTO: 0 %
ERYTHROCYTE [DISTWIDTH] IN BLOOD BY AUTOMATED COUNT: 14.3 % (ref 10–15)
HCT VFR BLD AUTO: 37.6 % (ref 35–47)
HGB BLD-MCNC: 12.6 G/DL (ref 11.7–15.7)
IMM GRANULOCYTES # BLD: 0 10E9/L (ref 0–0.4)
IMM GRANULOCYTES NFR BLD: 0.3 %
INR PPP: 2.12 (ref 0.86–1.14)
LYMPHOCYTES # BLD AUTO: 0.4 10E9/L (ref 0.8–5.3)
LYMPHOCYTES NFR BLD AUTO: 5.4 %
MCH RBC QN AUTO: 33.2 PG (ref 26.5–33)
MCHC RBC AUTO-ENTMCNC: 33.5 G/DL (ref 31.5–36.5)
MCV RBC AUTO: 99 FL (ref 78–100)
MONOCYTES # BLD AUTO: 0.1 10E9/L (ref 0–1.3)
MONOCYTES NFR BLD AUTO: 1.3 %
NEUTROPHILS # BLD AUTO: 7.3 10E9/L (ref 1.6–8.3)
NEUTROPHILS NFR BLD AUTO: 93 %
NRBC # BLD AUTO: 0 10*3/UL
NRBC BLD AUTO-RTO: 0 /100
PLATELET # BLD AUTO: 145 10E9/L (ref 150–450)
RBC # BLD AUTO: 3.79 10E12/L (ref 3.8–5.2)
WBC # BLD AUTO: 7.8 10E9/L (ref 4–11)

## 2018-02-21 PROCEDURE — 99207 ZZC NO CHARGE NURSE ONLY: CPT | Performed by: INTERNAL MEDICINE

## 2018-02-21 PROCEDURE — 25000128 H RX IP 250 OP 636: Performed by: INTERNAL MEDICINE

## 2018-02-21 PROCEDURE — 85610 PROTHROMBIN TIME: CPT | Performed by: INTERNAL MEDICINE

## 2018-02-21 PROCEDURE — 96401 CHEMO ANTI-NEOPL SQ/IM: CPT

## 2018-02-21 PROCEDURE — 85025 COMPLETE CBC W/AUTO DIFF WBC: CPT | Performed by: INTERNAL MEDICINE

## 2018-02-21 RX ORDER — HEPARIN SODIUM (PORCINE) LOCK FLUSH IV SOLN 100 UNIT/ML 100 UNIT/ML
500 SOLUTION INTRAVENOUS EVERY 8 HOURS
Status: CANCELLED
Start: 2018-02-21

## 2018-02-21 RX ORDER — HEPARIN SODIUM (PORCINE) LOCK FLUSH IV SOLN 100 UNIT/ML 100 UNIT/ML
5 SOLUTION INTRAVENOUS EVERY 8 HOURS
Status: DISCONTINUED | OUTPATIENT
Start: 2018-02-21 | End: 2018-02-21 | Stop reason: HOSPADM

## 2018-02-21 RX ADMIN — BORTEZOMIB 2.3 MG: 3.5 INJECTION, POWDER, LYOPHILIZED, FOR SOLUTION INTRAVENOUS; SUBCUTANEOUS at 12:38

## 2018-02-21 RX ADMIN — HEPARIN 5 ML: 100 SYRINGE at 12:48

## 2018-02-21 ASSESSMENT — PAIN SCALES - GENERAL: PAINLEVEL: NO PAIN (0)

## 2018-02-21 NOTE — MR AVS SNAPSHOT
Amira Arreola   2/21/2018   Anticoagulation Therapy Visit    Description:  85 year old female   Provider:  Addy Frias MD   Department:  [facility]           INR as of 2/21/2018     Today's INR 2.12      Anticoagulation Summary as of 2/21/2018     INR goal 2.0-3.0   Today's INR 2.12   Full instructions 6 mg on Mon, Fri; 4 mg all other days   Next INR check 2/28/2018    Indications   Long-term (current) use of anticoagulants [Z79.01] [Z79.01]  Atrial fibrillation (H) [I48.91] (Resolved) [I48.91]         February 2018 Details    Sun Mon Tue Wed Thu Fri Sat         1               2               3                 4               5               6               7               8               9               10                 11               12               13               14               15               16               17                 18               19               20               21      4 mg   See details      22      4 mg         23      6 mg         24      4 mg           25      4 mg         26      6 mg         27      4 mg         28                Date Details   02/21 This INR check       Date of next INR:  2/28/2018         How to take your warfarin dose     To take:  4 mg Take 1 of the 4 mg tablets.    To take:  6 mg Take 1.5 of the 4 mg tablets.

## 2018-02-21 NOTE — MR AVS SNAPSHOT
After Visit Summary   2/21/2018    Amira Arreola    MRN: [id number]           Patient Information     Date Of Birth          7/17/1932        Visit Information        Provider Department      2/21/2018 10:30 AM  INFUSION CHAIR 13 [facility]        Today's Diagnoses     Multiple myeloma not having achieved remission (H)    -  1    Paroxysmal atrial fibrillation (H)           Follow-ups after your visit        Your next 10 appointments already scheduled     Feb 28, 2018  1:30 PM CST   Level 2 with  INFUSION CHAIR 12   [facility] ([facility])    [facility]  [address]  [place] [address]   [phone]            Feb 28, 2018  2:00 PM CST   Return Visit with Shayne Roberts MD   [facility] ([facility])    [facility]  [address]  [place] [address]   [phone]            Mar 06, 2018 10:15 AM CST   Return Visit with Ramos Rivera MD   [facility] ([facility])    [address]  [place] [address]   [phone]              Who to contact     If you have questions or need follow up information about today's clinic visit or your schedule please contact [facility] directly at [phone].  Normal or non-critical lab and imaging results will be communicated to you by MyChart, letter or phone within 4 business days after the clinic has received the results. If you do not hear from us within 7 days, please contact the clinic through MyChart or phone. If you have a critical or abnormal lab result, we will notify you by phone as soon as possible.  Submit refill requests through [employer] or call your pharmacy and they will forward the refill request to us. Please allow 3 business days for your refill to be completed.           "Additional Information About Your Visit        MyChart Information     [employer] lets you send messages to your doctor, view your test results, renew your prescriptions, schedule appointments and more. To sign up, go to [email or web address] . Click on \"Log in\" on the left side of the screen, which will take you to the Welcome page. Then click on \"Sign up Now\" on the right side of the page.     You will be asked to enter the access code listed below, as well as some personal information. Please follow the directions to create your username and password.     Your access code is: [email or web address]  Expires: 4/3/2018  3:04 PM     Your access code will  in 90 days. If you need help or a new code, please call your [facility] clinic or [phone].        Care EveryWhere ID     This is your Care EveryWhere ID. This could be used by other organizations to access your [facility] medical records  [id number]        Your Vitals Were     Pulse Temperature Respirations Height Pulse Oximetry BMI (Body Mass Index)    100 97.5  F (36.4  C) (Oral) 16 1.664 m (5' 5.51\") 97% 23.52 kg/m2       Blood Pressure from Last 3 Encounters:   18 (!) 141/91   18 (!) 146/91   18 153/78    Weight from Last 3 Encounters:   18 65.1 kg (143 lb 9.6 oz)   18 65.3 kg (144 lb)   18 65.3 kg (144 lb)              We Performed the Following     CBC with platelets differential     INR        Primary Care Provider Office Phone # Fax #    Addy Frias [phone] [address]  [place] [address]        Equal Access to Services     HARINI ZELAYA : Gissel Galloway, wajanetda luqalexis, qaybta kaalmakira gupta, hung sadler. So [facility] [phone].    ATENCIÓN: Si habla español, tiene a barlow disposición servicios gratuitos de asistencia lingüística. Llame al [phone].    We comply with applicable federal civil rights laws and [place] laws. We do " not discriminate on the basis of race, color, national origin, age, disability, sex, sexual orientation, or gender identity.            Thank you!     Thank you for choosing [facility]  for your care. Our goal is always to provide you with excellent care. Hearing back from our patients is one way we can continue to improve our services. Please take a few minutes to complete the written survey that you may receive in the mail after your visit with us. Thank you!             Your Updated Medication List - Protect others around you: Learn how to safely use, store and throw away your medicines at www.disposemymeds.org.          This list is accurate as of 2/21/18  2:16 PM.  Always use your most recent med list.                   Brand Name Dispense Instructions for use Diagnosis    acyclovir 400 MG tablet    ZOVIRAX    60 tablet    Take 1 tablet (400 mg) by mouth 2 times daily Viral Prophylaxis.    Multiple myeloma not having achieved remission (H)       CALCIUM CITRATE + PO      Take 2,000 mg by mouth daily 2 tabs        carboxymethylcellulose 0.5 % Soln ophthalmic solution    REFRESH PLUS     1 drop 4 times daily        CLARINEX PO      Take by mouth daily Taking claritin        COMPAZINE PO      Take 10 mg by mouth daily as needed        cycloSPORINE 0.05 % ophthalmic emulsion    RESTASIS     Place 1 drop into both eyes every 12 hours        DAILY MULTIVITAMIN PO      Take 1 tablet by mouth daily.    Routine general medical examination at a health care facility       dexamethasone 4 MG tablet    DECADRON    28 tablet    Take 20mg (5 tablets) by mouth every week on the morning of velcade injection.    Multiple myeloma not having achieved remission (H)       erythromycin ophthalmic ointment    ROMYCIN     Place 1 Application into both eyes At Bedtime        GENTLE STOOL SOFTENER PO      Take 100 mg by mouth daily        lidocaine-prilocaine cream    EMLA    30 g    Apply topically as  needed for moderate pain Apply dollop size amount to port site 30-60 min prior to accessing    Multiple myeloma not having achieved remission (H)       LORazepam 0.5 MG tablet    ATIVAN    30 tablet    Take 1 tablet (0.5 mg) by mouth every 8 hours as needed for anxiety    Multiple myeloma not having achieved remission (H)       metoprolol succinate 50 MG 24 hr tablet    TOPROL XL    270 tablet    Take 2 tablets (100mg) in the morning and 1 tablet (50mg) in the evening by mouth daily    Paroxysmal atrial fibrillation (H)       oxyCODONE IR 15 MG tablet    ROXICODONE    60 tablet    Take 1 tablet (15 mg) by mouth every 8 hours as needed for pain maximum 4 tablet(s) per day    Multiple myeloma not having achieved remission (H)       polyethylene glycol powder    MIRALAX/GLYCOLAX     Take 1 capful by mouth daily as needed    Bilateral leg edema       SIMBRINZA 1-0.2 % ophthalmic suspension   Generic drug:  brinzolamide-brimonidine      Place 1 drop into the right eye 2 times daily 1 drop AM and PM        triamcinolone 0.5 % cream    KENALOG    15 g    Apply sparingly to affected area three times daily. 1-2 weeks , as needed    Rash and nonspecific skin eruption       TYLENOL PO      Take 500 mg by mouth every 6 hours as needed for mild pain or fever        UNABLE TO FIND      MEDICATION NAME: Fresh Coat eye drops        VITAMIN D3 PO      Take 1,000 Units by mouth daily        warfarin 4 MG tablet    COUMADIN    110 tablet    TAKE ONE AND ONE-HALF TABLETS BY MOUTH ON MONDAY, WEDNESDAY, AND FRIDAY AND ONE TABLET THE OTHER DAYS OF THE WEEK    Paroxysmal atrial fibrillation (H), Long-term (current) use of anticoagulants       ZOMETA IV      Inject into the vein every 30 days Every 3 month dosing

## 2018-02-21 NOTE — PROGRESS NOTES
Infusion Nursing Note:  Amira Arreola presents today for C10 D8 Velcade.    Patient seen by provider today: No   present during visit today: Not Applicable.    Note: Refer to doc flowsheet for assessment, no new concerns. Patient verified she is taking oral dexamethasone and acyclovir as ordered. Took oral dex this morning prior to infusion appt. INR ordered per patient request.    Intravenous Access:  Labs drawn without difficulty.  Implanted Port.    Treatment Conditions:  Lab Results   Component Value Date    HGB 12.6 02/21/2018     Lab Results   Component Value Date    WBC 7.8 02/21/2018      Lab Results   Component Value Date    ANEU 7.3 02/21/2018     Lab Results   Component Value Date     02/21/2018      Results reviewed, labs MET treatment parameters, ok to proceed with treatment.      Post Infusion Assessment:  Patient tolerated injection without incident.  Site patent and intact, free from redness, edema or discomfort.  No evidence of extravasations.  Access discontinued per protocol.    Discharge Plan:   Discharge instructions reviewed with: Patient.  Patient and/or family verbalized understanding of discharge instructions and all questions answered.  Copy of AVS reviewed with patient and/or family.  Patient will return 2/28 for next appointment.  Patient discharged in stable condition accompanied by: self.  Departure Mode: Ambulatory.    Allyn Corona RN

## 2018-02-21 NOTE — PROGRESS NOTES
ANTICOAGULATION FOLLOW-UP CLINIC VISIT    Patient Name:  Amira Arreola  Date:  2/21/2018  Contact Type:  Telephone    SUBJECTIVE:     Patient Findings     Positives No Problem Findings           OBJECTIVE    INR   Date Value Ref Range Status   02/21/2018 2.12 (H) 0.86 - 1.14 Final       ASSESSMENT / PLAN  INR assessment THER    Recheck INR In: 1 WEEK    INR Location Outside lab      Anticoagulation Summary as of 2/21/2018     INR goal 2.0-3.0   Today's INR 2.12   Maintenance plan 6 mg (4 mg x 1.5) on Mon, Fri; 4 mg (4 mg x 1) all other days   Full instructions 6 mg on Mon, Fri; 4 mg all other days   Weekly total 32 mg   No change documented Michelle Pinon RN   Plan last modified Tigist Corral RN (1/10/2018)   Next INR check 2/28/2018   Target end date Indefinite    Indications   Long-term (current) use of anticoagulants [Z79.01] [Z79.01]  Atrial fibrillation (H) [I48.91] (Resolved) [I48.91]         Anticoagulation Episode Summary     INR check location     Preferred lab     Send INR reminders to CS ANTICOAGULATION    Comments patient have standing INR order to be draw at infusion visit.  advise to call [facility] when have INR draw for dosing instruction.  patient can be reach at home phone [phone]      Anticoagulation Care Providers     Provider Role Specialty Phone number    Addy Frias MD [facility] [phone]            See the Encounter Report to view Anticoagulation Flowsheet and Dosing Calendar (Go to Encounters tab in chart review, and find the Anticoagulation Therapy Visit)    Dosage adjustment made based on physician directed care plan.  Left detailed VM for pt advising she continue current dose  Recheck 1 week at infusion appt next Wed    Michelle Pinon, SHELLIE

## 2018-02-28 ENCOUNTER — ANTICOAGULATION THERAPY VISIT (OUTPATIENT)
Dept: FAMILY MEDICINE | Facility: CLINIC | Age: 83
End: 2018-02-28
Payer: COMMERCIAL

## 2018-02-28 ENCOUNTER — INFUSION THERAPY VISIT (OUTPATIENT)
Dept: INFUSION THERAPY | Facility: CLINIC | Age: 83
End: 2018-02-28
Attending: INTERNAL MEDICINE
Payer: MEDICARE

## 2018-02-28 ENCOUNTER — HOSPITAL ENCOUNTER (OUTPATIENT)
Facility: CLINIC | Age: 83
Setting detail: SPECIMEN
Discharge: HOME | End: 2018-02-28
Attending: INTERNAL MEDICINE | Admitting: INTERNAL MEDICINE
Payer: MEDICARE

## 2018-02-28 ENCOUNTER — ONCOLOGY VISIT (OUTPATIENT)
Dept: ONCOLOGY | Facility: CLINIC | Age: 83
End: 2018-02-28
Attending: INTERNAL MEDICINE
Payer: MEDICARE

## 2018-02-28 VITALS
HEART RATE: 78 BPM | SYSTOLIC BLOOD PRESSURE: 138 MMHG | OXYGEN SATURATION: 95 % | BODY MASS INDEX: 23.75 KG/M2 | WEIGHT: 145 LBS | DIASTOLIC BLOOD PRESSURE: 70 MMHG | TEMPERATURE: 98.2 F | RESPIRATION RATE: 16 BRPM

## 2018-02-28 VITALS — BODY MASS INDEX: 23.3 KG/M2 | WEIGHT: 145 LBS | HEIGHT: 66 IN

## 2018-02-28 DIAGNOSIS — C90.00 MULTIPLE MYELOMA NOT HAVING ACHIEVED REMISSION (H): Primary | ICD-10-CM

## 2018-02-28 DIAGNOSIS — I48.0 PAROXYSMAL ATRIAL FIBRILLATION (H): ICD-10-CM

## 2018-02-28 DIAGNOSIS — R21 RASH AND NONSPECIFIC SKIN ERUPTION: ICD-10-CM

## 2018-02-28 DIAGNOSIS — Z79.01 LONG-TERM (CURRENT) USE OF ANTICOAGULANTS: ICD-10-CM

## 2018-02-28 LAB
BASOPHILS # BLD AUTO: 0 10E9/L (ref 0–0.2)
BASOPHILS NFR BLD AUTO: 0 %
DIFFERENTIAL METHOD BLD: ABNORMAL
EOSINOPHIL # BLD AUTO: 0 10E9/L (ref 0–0.7)
EOSINOPHIL NFR BLD AUTO: 0 %
ERYTHROCYTE [DISTWIDTH] IN BLOOD BY AUTOMATED COUNT: 14.2 % (ref 10–15)
HCT VFR BLD AUTO: 36.1 % (ref 35–47)
HGB BLD-MCNC: 12.3 G/DL (ref 11.7–15.7)
IMM GRANULOCYTES # BLD: 0 10E9/L (ref 0–0.4)
IMM GRANULOCYTES NFR BLD: 0.5 %
INR PPP: 2.11
INR PPP: 2.11 (ref 0.86–1.14)
LYMPHOCYTES # BLD AUTO: 0.4 10E9/L (ref 0.8–5.3)
LYMPHOCYTES NFR BLD AUTO: 6.3 %
MCH RBC QN AUTO: 33.9 PG (ref 26.5–33)
MCHC RBC AUTO-ENTMCNC: 34.1 G/DL (ref 31.5–36.5)
MCV RBC AUTO: 99 FL (ref 78–100)
MONOCYTES # BLD AUTO: 0.1 10E9/L (ref 0–1.3)
MONOCYTES NFR BLD AUTO: 0.9 %
NEUTROPHILS # BLD AUTO: 5.9 10E9/L (ref 1.6–8.3)
NEUTROPHILS NFR BLD AUTO: 92.3 %
NRBC # BLD AUTO: 0 10*3/UL
NRBC BLD AUTO-RTO: 0 /100
PLATELET # BLD AUTO: 153 10E9/L (ref 150–450)
RBC # BLD AUTO: 3.63 10E12/L (ref 3.8–5.2)
WBC # BLD AUTO: 6.4 10E9/L (ref 4–11)

## 2018-02-28 PROCEDURE — 85025 COMPLETE CBC W/AUTO DIFF WBC: CPT | Performed by: INTERNAL MEDICINE

## 2018-02-28 PROCEDURE — 96401 CHEMO ANTI-NEOPL SQ/IM: CPT

## 2018-02-28 PROCEDURE — 25000128 H RX IP 250 OP 636: Performed by: INTERNAL MEDICINE

## 2018-02-28 PROCEDURE — 99207 ZZC NO CHARGE NURSE ONLY: CPT | Performed by: INTERNAL MEDICINE

## 2018-02-28 PROCEDURE — 99214 OFFICE O/P EST MOD 30 MIN: CPT | Performed by: INTERNAL MEDICINE

## 2018-02-28 PROCEDURE — 85610 PROTHROMBIN TIME: CPT | Performed by: INTERNAL MEDICINE

## 2018-02-28 PROCEDURE — G0463 HOSPITAL OUTPT CLINIC VISIT: HCPCS

## 2018-02-28 RX ORDER — HEPARIN SODIUM (PORCINE) LOCK FLUSH IV SOLN 100 UNIT/ML 100 UNIT/ML
5 SOLUTION INTRAVENOUS EVERY 8 HOURS
Status: CANCELLED
Start: 2018-03-14

## 2018-02-28 RX ORDER — EPINEPHRINE 1 MG/ML
0.3 INJECTION, SOLUTION INTRAMUSCULAR; SUBCUTANEOUS EVERY 5 MIN PRN
Status: CANCELLED | OUTPATIENT
Start: 2018-03-28

## 2018-02-28 RX ORDER — LORAZEPAM 2 MG/ML
0.5 INJECTION INTRAMUSCULAR EVERY 4 HOURS PRN
Status: CANCELLED
Start: 2018-03-28

## 2018-02-28 RX ORDER — ACETAMINOPHEN 325 MG/1
650 TABLET ORAL ONCE
Status: CANCELLED | OUTPATIENT
Start: 2018-03-14

## 2018-02-28 RX ORDER — DIPHENHYDRAMINE HYDROCHLORIDE 50 MG/ML
50 INJECTION INTRAMUSCULAR; INTRAVENOUS
Status: CANCELLED
Start: 2018-03-14

## 2018-02-28 RX ORDER — ALBUTEROL SULFATE 0.83 MG/ML
2.5 SOLUTION RESPIRATORY (INHALATION)
Status: CANCELLED | OUTPATIENT
Start: 2018-03-14

## 2018-02-28 RX ORDER — HEPARIN SODIUM (PORCINE) LOCK FLUSH IV SOLN 100 UNIT/ML 100 UNIT/ML
5 SOLUTION INTRAVENOUS EVERY 8 HOURS
Status: CANCELLED
Start: 2018-04-04

## 2018-02-28 RX ORDER — MEPERIDINE HYDROCHLORIDE 25 MG/ML
25 INJECTION INTRAMUSCULAR; INTRAVENOUS; SUBCUTANEOUS EVERY 30 MIN PRN
Status: CANCELLED | OUTPATIENT
Start: 2018-03-21

## 2018-02-28 RX ORDER — MEPERIDINE HYDROCHLORIDE 25 MG/ML
25 INJECTION INTRAMUSCULAR; INTRAVENOUS; SUBCUTANEOUS EVERY 30 MIN PRN
Status: CANCELLED | OUTPATIENT
Start: 2018-04-04

## 2018-02-28 RX ORDER — OXYCODONE HYDROCHLORIDE 15 MG/1
15 TABLET ORAL EVERY 8 HOURS PRN
Qty: 60 TABLET | Refills: 0 | Status: SHIPPED | OUTPATIENT
Start: 2018-02-28 | End: 2018-03-28

## 2018-02-28 RX ORDER — EPINEPHRINE 1 MG/ML
0.3 INJECTION, SOLUTION INTRAMUSCULAR; SUBCUTANEOUS EVERY 5 MIN PRN
Status: CANCELLED | OUTPATIENT
Start: 2018-03-21

## 2018-02-28 RX ORDER — MEPERIDINE HYDROCHLORIDE 25 MG/ML
25 INJECTION INTRAMUSCULAR; INTRAVENOUS; SUBCUTANEOUS EVERY 30 MIN PRN
Status: CANCELLED | OUTPATIENT
Start: 2018-03-14

## 2018-02-28 RX ORDER — ALBUTEROL SULFATE 0.83 MG/ML
2.5 SOLUTION RESPIRATORY (INHALATION)
Status: CANCELLED | OUTPATIENT
Start: 2018-03-28

## 2018-02-28 RX ORDER — METHYLPREDNISOLONE SODIUM SUCCINATE 125 MG/2ML
125 INJECTION, POWDER, LYOPHILIZED, FOR SOLUTION INTRAMUSCULAR; INTRAVENOUS
Status: CANCELLED
Start: 2018-03-21

## 2018-02-28 RX ORDER — ALBUTEROL SULFATE 0.83 MG/ML
2.5 SOLUTION RESPIRATORY (INHALATION)
Status: CANCELLED | OUTPATIENT
Start: 2018-03-21

## 2018-02-28 RX ORDER — LORAZEPAM 2 MG/ML
0.5 INJECTION INTRAMUSCULAR EVERY 4 HOURS PRN
Status: CANCELLED
Start: 2018-03-21

## 2018-02-28 RX ORDER — EPINEPHRINE 0.3 MG/.3ML
0.3 INJECTION SUBCUTANEOUS EVERY 5 MIN PRN
Status: CANCELLED | OUTPATIENT
Start: 2018-03-14

## 2018-02-28 RX ORDER — ALBUTEROL SULFATE 90 UG/1
1-2 AEROSOL, METERED RESPIRATORY (INHALATION)
Status: CANCELLED
Start: 2018-03-28

## 2018-02-28 RX ORDER — EPINEPHRINE 1 MG/ML
0.3 INJECTION, SOLUTION INTRAMUSCULAR; SUBCUTANEOUS EVERY 5 MIN PRN
Status: CANCELLED | OUTPATIENT
Start: 2018-03-14

## 2018-02-28 RX ORDER — LORAZEPAM 2 MG/ML
0.5 INJECTION INTRAMUSCULAR EVERY 4 HOURS PRN
Status: CANCELLED
Start: 2018-03-14

## 2018-02-28 RX ORDER — METHYLPREDNISOLONE SODIUM SUCCINATE 125 MG/2ML
125 INJECTION, POWDER, LYOPHILIZED, FOR SOLUTION INTRAMUSCULAR; INTRAVENOUS
Status: CANCELLED
Start: 2018-03-28

## 2018-02-28 RX ORDER — ALBUTEROL SULFATE 90 UG/1
1-2 AEROSOL, METERED RESPIRATORY (INHALATION)
Status: CANCELLED
Start: 2018-03-21

## 2018-02-28 RX ORDER — SODIUM CHLORIDE 9 MG/ML
1000 INJECTION, SOLUTION INTRAVENOUS CONTINUOUS PRN
Status: CANCELLED
Start: 2018-03-14

## 2018-02-28 RX ORDER — DIPHENHYDRAMINE HYDROCHLORIDE 50 MG/ML
50 INJECTION INTRAMUSCULAR; INTRAVENOUS
Status: CANCELLED
Start: 2018-03-21

## 2018-02-28 RX ORDER — METHYLPREDNISOLONE SODIUM SUCCINATE 125 MG/2ML
125 INJECTION, POWDER, LYOPHILIZED, FOR SOLUTION INTRAMUSCULAR; INTRAVENOUS
Status: CANCELLED
Start: 2018-03-14

## 2018-02-28 RX ORDER — DIPHENHYDRAMINE HCL 25 MG
50 CAPSULE ORAL ONCE
Status: CANCELLED | OUTPATIENT
Start: 2018-03-14

## 2018-02-28 RX ORDER — HEPARIN SODIUM (PORCINE) LOCK FLUSH IV SOLN 100 UNIT/ML 100 UNIT/ML
5 SOLUTION INTRAVENOUS EVERY 8 HOURS
Status: CANCELLED
Start: 2018-03-21

## 2018-02-28 RX ORDER — HEPARIN SODIUM (PORCINE) LOCK FLUSH IV SOLN 100 UNIT/ML 100 UNIT/ML
5 SOLUTION INTRAVENOUS EVERY 8 HOURS
Status: CANCELLED
Start: 2018-03-28

## 2018-02-28 RX ORDER — ALBUTEROL SULFATE 90 UG/1
1-2 AEROSOL, METERED RESPIRATORY (INHALATION)
Status: CANCELLED
Start: 2018-03-14

## 2018-02-28 RX ORDER — EPINEPHRINE 0.3 MG/.3ML
0.3 INJECTION SUBCUTANEOUS EVERY 5 MIN PRN
Status: CANCELLED | OUTPATIENT
Start: 2018-03-21

## 2018-02-28 RX ORDER — HEPARIN SODIUM (PORCINE) LOCK FLUSH IV SOLN 100 UNIT/ML 100 UNIT/ML
5 SOLUTION INTRAVENOUS EVERY 8 HOURS
Status: DISCONTINUED | OUTPATIENT
Start: 2018-02-28 | End: 2018-02-28 | Stop reason: HOSPADM

## 2018-02-28 RX ORDER — ALBUTEROL SULFATE 0.83 MG/ML
2.5 SOLUTION RESPIRATORY (INHALATION)
Status: CANCELLED | OUTPATIENT
Start: 2018-04-04

## 2018-02-28 RX ORDER — MEPERIDINE HYDROCHLORIDE 25 MG/ML
25 INJECTION INTRAMUSCULAR; INTRAVENOUS; SUBCUTANEOUS EVERY 30 MIN PRN
Status: CANCELLED | OUTPATIENT
Start: 2018-03-28

## 2018-02-28 RX ORDER — METHYLPREDNISOLONE SODIUM SUCCINATE 125 MG/2ML
125 INJECTION, POWDER, LYOPHILIZED, FOR SOLUTION INTRAMUSCULAR; INTRAVENOUS
Status: CANCELLED
Start: 2018-04-04

## 2018-02-28 RX ORDER — TRIAMCINOLONE ACETONIDE 5 MG/G
CREAM TOPICAL
Qty: 15 G | Refills: 1 | Status: SHIPPED | OUTPATIENT
Start: 2018-02-28 | End: 2019-03-22

## 2018-02-28 RX ORDER — ALBUTEROL SULFATE 90 UG/1
1-2 AEROSOL, METERED RESPIRATORY (INHALATION)
Status: CANCELLED
Start: 2018-04-04

## 2018-02-28 RX ORDER — SODIUM CHLORIDE 9 MG/ML
1000 INJECTION, SOLUTION INTRAVENOUS CONTINUOUS PRN
Status: CANCELLED
Start: 2018-03-28

## 2018-02-28 RX ORDER — EPINEPHRINE 0.3 MG/.3ML
0.3 INJECTION SUBCUTANEOUS EVERY 5 MIN PRN
Status: CANCELLED | OUTPATIENT
Start: 2018-03-28

## 2018-02-28 RX ORDER — EPINEPHRINE 0.3 MG/.3ML
0.3 INJECTION SUBCUTANEOUS EVERY 5 MIN PRN
Status: CANCELLED | OUTPATIENT
Start: 2018-04-04

## 2018-02-28 RX ORDER — DIPHENHYDRAMINE HYDROCHLORIDE 50 MG/ML
50 INJECTION INTRAMUSCULAR; INTRAVENOUS
Status: CANCELLED
Start: 2018-03-28

## 2018-02-28 RX ORDER — DIPHENHYDRAMINE HYDROCHLORIDE 50 MG/ML
50 INJECTION INTRAMUSCULAR; INTRAVENOUS
Status: CANCELLED
Start: 2018-04-04

## 2018-02-28 RX ORDER — EPINEPHRINE 1 MG/ML
0.3 INJECTION, SOLUTION INTRAMUSCULAR; SUBCUTANEOUS EVERY 5 MIN PRN
Status: CANCELLED | OUTPATIENT
Start: 2018-04-04

## 2018-02-28 RX ORDER — LORAZEPAM 2 MG/ML
0.5 INJECTION INTRAMUSCULAR EVERY 4 HOURS PRN
Status: CANCELLED
Start: 2018-04-04

## 2018-02-28 RX ORDER — ZOLEDRONIC ACID 0.04 MG/ML
4 INJECTION, SOLUTION INTRAVENOUS ONCE
Status: CANCELLED | OUTPATIENT
Start: 2018-05-02 | End: 2018-05-02

## 2018-02-28 RX ORDER — SODIUM CHLORIDE 9 MG/ML
1000 INJECTION, SOLUTION INTRAVENOUS CONTINUOUS PRN
Status: CANCELLED
Start: 2018-04-04

## 2018-02-28 RX ORDER — SODIUM CHLORIDE 9 MG/ML
1000 INJECTION, SOLUTION INTRAVENOUS CONTINUOUS PRN
Status: CANCELLED
Start: 2018-03-21

## 2018-02-28 RX ADMIN — HEPARIN 5 ML: 100 SYRINGE at 14:41

## 2018-02-28 RX ADMIN — BORTEZOMIB 2.3 MG: 3.5 INJECTION, POWDER, LYOPHILIZED, FOR SOLUTION INTRAVENOUS; SUBCUTANEOUS at 14:41

## 2018-02-28 ASSESSMENT — PAIN SCALES - GENERAL
PAINLEVEL: NO PAIN (0)
PAINLEVEL: MODERATE PAIN (4)

## 2018-02-28 NOTE — MR AVS SNAPSHOT
After Visit Summary   2/28/2018    Amira Arreola    MRN: [id number]           Patient Information     Date Of Birth          7/17/1932        Visit Information        Provider Department      2/28/2018 2:00 PM Shayne Roberts MD [facility]        Today's Diagnoses     Multiple myeloma not having achieved remission (H)    -  1    Rash and nonspecific skin eruption          Care Instructions    Continue chemotherapy.  Follow up in 1 month.          Follow-ups after your visit        Your next 10 appointments already scheduled     Mar 07, 2018 10:30 AM CST   Level 2 with SH INFUSION CHAIR 17   [facility] ([facility])    [facility]  [address]  [place] [address]   [phone]            Mar 14, 2018 10:00 AM CDT   Level 2 with SH INFUSION CHAIR 16   [facility] ([facility])    [facility]  [address]  [place] [address]   [phone]            Mar 21, 2018 10:00 AM CDT   Level 2 with SH INFUSION CHAIR 20   [facility] ([facility])    [facility]  [address]  [place] [address]   [phone]            Apr 30, 2018 10:15 AM CDT   Return Visit with Ramos Rivera MD   [facility] ([facility])    [address]  [facility] [place] [address] [phone]              Who to contact     If you have questions or need follow up information about today's clinic visit or your schedule please contact [facility] directly at [phone].  Normal or non-critical lab and imaging results will be communicated to you by MyChart, letter or phone within 4 business days after the clinic has received the results. If you do not hear from us within 7 days, please contact the clinic  "through [employer] or phone. If you have a critical or abnormal lab result, we will notify you by phone as soon as possible.  Submit refill requests through [employer] or call your pharmacy and they will forward the refill request to us. Please allow 3 business days for your refill to be completed.          Additional Information About Your Visit        [email or web address] Information     [employer] lets you send messages to your doctor, view your test results, renew your prescriptions, schedule appointments and more. To sign up, go to [email or web address] . Click on \"Log in\" on the left side of the screen, which will take you to the Welcome page. Then click on \"Sign up Now\" on the right side of the page.     You will be asked to enter the access code listed below, as well as some personal information. Please follow the directions to create your username and password.     Your access code is: [email or web address]  Expires: 4/3/2018  3:04 PM     Your access code will  in 90 days. If you need help or a new code, please call your [facility] clinic or [phone].        Care EveryWhere ID     This is your Care EveryWhere ID. This could be used by other organizations to access your [facility] medical records  [id number]        Your Vitals Were     Pulse Temperature Respirations Pulse Oximetry BMI (Body Mass Index)       78 98.2  F (36.8  C) (Oral) 16 95% 23.76 kg/m2        Blood Pressure from Last 3 Encounters:   18 138/70   18 (!) 141/91   18 (!) 146/91    Weight from Last 3 Encounters:   18 65.8 kg (145 lb)   18 65.8 kg (145 lb)   18 65.1 kg (143 lb 9.6 oz)              Today, you had the following     No orders found for display         Where to get your medicines      These medications were sent to [facility] - [place], [place] - [address] AT [facility] [address]  [address], [place] [address]     Phone:  [phone]     triamcinolone 0.5 % cream         Some of these " will need a paper prescription and others can be bought over the counter.  Ask your nurse if you have questions.     Bring a paper prescription for each of these medications     oxyCODONE IR 15 MG tablet          Primary Care Provider Office Phone # Fax #    Addy Sean Frias [phone] [address]  [place] [address]        Equal Access to Services     [facility]: Hadii aad ku hadasho Soomaali, waaxda luqadaha, qaybta kaalmada adeegyada, waxay idiin hayaan adeeg khedgardosh la'aan . So [facility] [phone].    ATENCIÓN: Si habla español, tiene a barlow disposición servicios gratuitos de asistencia lingüística. Llame al [phone].    We comply with applicable federal civil rights laws and [place] laws. We do not discriminate on the basis of race, color, national origin, age, disability, sex, sexual orientation, or gender identity.            Thank you!     Thank you for choosing [facility]  for your care. Our goal is always to provide you with excellent care. Hearing back from our patients is one way we can continue to improve our services. Please take a few minutes to complete the written survey that you may receive in the mail after your visit with us. Thank you!             Your Updated Medication List - Protect others around you: Learn how to safely use, store and throw away your medicines at www.disposemymeds.org.          This list is accurate as of 2/28/18  2:54 PM.  Always use your most recent med list.                   Brand Name Dispense Instructions for use Diagnosis    acyclovir 400 MG tablet    ZOVIRAX    60 tablet    Take 1 tablet (400 mg) by mouth 2 times daily Viral Prophylaxis.    Multiple myeloma not having achieved remission (H)       CALCIUM CITRATE + PO      Take 2,000 mg by mouth daily 2 tabs        carboxymethylcellulose 0.5 % Soln ophthalmic solution    REFRESH PLUS     1 drop 4 times daily        CLARINEX PO      Take by mouth daily Taking  claritin        COMPAZINE PO      Take 10 mg by mouth daily as needed        cycloSPORINE 0.05 % ophthalmic emulsion    RESTASIS     Place 1 drop into both eyes every 12 hours        DAILY MULTIVITAMIN PO      Take 1 tablet by mouth daily.    Routine general medical examination at a health care facility       dexamethasone 4 MG tablet    DECADRON    28 tablet    Take 20mg (5 tablets) by mouth every week on the morning of velcade injection.    Multiple myeloma not having achieved remission (H)       erythromycin ophthalmic ointment    ROMYCIN     Place 1 Application into both eyes At Bedtime        GENTLE STOOL SOFTENER PO      Take 100 mg by mouth daily        lidocaine-prilocaine cream    EMLA    30 g    Apply topically as needed for moderate pain Apply dollop size amount to port site 30-60 min prior to accessing    Multiple myeloma not having achieved remission (H)       LORazepam 0.5 MG tablet    ATIVAN    30 tablet    Take 1 tablet (0.5 mg) by mouth every 8 hours as needed for anxiety    Multiple myeloma not having achieved remission (H)       metoprolol succinate 50 MG 24 hr tablet    TOPROL XL    270 tablet    Take 2 tablets (100mg) in the morning and 1 tablet (50mg) in the evening by mouth daily    Paroxysmal atrial fibrillation (H)       oxyCODONE IR 15 MG tablet    ROXICODONE    60 tablet    Take 1 tablet (15 mg) by mouth every 8 hours as needed for pain maximum 4 tablet(s) per day    Multiple myeloma not having achieved remission (H)       polyethylene glycol powder    MIRALAX/GLYCOLAX     Take 1 capful by mouth daily as needed    Bilateral leg edema       SIMBRINZA 1-0.2 % ophthalmic suspension   Generic drug:  brinzolamide-brimonidine      Place 1 drop into the right eye 2 times daily 1 drop AM and PM        triamcinolone 0.5 % cream    KENALOG    15 g    Apply sparingly to affected area three times daily. 1-2 weeks , as needed    Rash and nonspecific skin eruption       TYLENOL PO      Take 500 mg by  mouth every 6 hours as needed for mild pain or fever        UNABLE TO FIND      MEDICATION NAME: Fresh Coat eye drops        VITAMIN D3 PO      Take 1,000 Units by mouth daily        warfarin 4 MG tablet    COUMADIN    110 tablet    TAKE ONE AND ONE-HALF TABLETS BY MOUTH ON MONDAY, WEDNESDAY, AND FRIDAY AND ONE TABLET THE OTHER DAYS OF THE WEEK    Paroxysmal atrial fibrillation (H), Long-term (current) use of anticoagulants       ZOMETA IV      Inject into the vein every 30 days Every 3 month dosing

## 2018-02-28 NOTE — MR AVS SNAPSHOT
After Visit Summary   2/28/2018    Amira Arreola    MRN: [id number]           Patient Information     Date Of Birth          7/17/1932        Visit Information        Provider Department      2/28/2018 1:30 PM  INFUSION CHAIR 12 [facility]        Today's Diagnoses     Multiple myeloma not having achieved remission (H)    -  1    Paroxysmal atrial fibrillation (H)           Follow-ups after your visit        Your next 10 appointments already scheduled     Mar 07, 2018 10:30 AM CST   Level 2 with  INFUSION CHAIR 17   [facility] ([facility])    [facility]  [address]  [place] [address]   [phone]            Mar 14, 2018 10:00 AM CDT   Level 2 with  INFUSION CHAIR 16   [facility] ([facility])    [facility]  [address]  [place] [address]   [phone]            Mar 21, 2018 10:00 AM CDT   Level 2 with  INFUSION CHAIR 20   [facility] ([facility])    [facility]  [address]  [place] [address]   [phone]            Apr 30, 2018 10:15 AM CDT   Return Visit with Ramos Rivera MD   [facility] ([facility])    [address]  [place] [address]   [phone]              Who to contact     If you have questions or need follow up information about today's clinic visit or your schedule please contact [facility] [place] directly at [phone].  Normal or non-critical lab and imaging results will be communicated to you by MyChart, letter or phone within 4 business days after the clinic has received the results. If you do not hear from us within 7 days, please contact the clinic through MyChart or phone. If you have a  "critical or abnormal lab result, we will notify you by phone as soon as possible.  Submit refill requests through [employer] or call your pharmacy and they will forward the refill request to us. Please allow 3 business days for your refill to be completed.          Additional Information About Your Visit        [employer] Information     [employer] lets you send messages to your doctor, view your test results, renew your prescriptions, schedule appointments and more. To sign up, go to [email or web address] . Click on \"Log in\" on the left side of the screen, which will take you to the Welcome page. Then click on \"Sign up Now\" on the right side of the page.     You will be asked to enter the access code listed below, as well as some personal information. Please follow the directions to create your username and password.     Your access code is: [email or web address]  Expires: 4/3/2018  3:04 PM     Your access code will  in 90 days. If you need help or a new code, please call your [facility] clinic or [phone].        Care EveryWhere ID     This is your Care EveryWhere ID. This could be used by other organizations to access your [facility] medical records  [id number]        Your Vitals Were     Height BMI (Body Mass Index)                1.664 m (5' 5.5\") 23.76 kg/m2           Blood Pressure from Last 3 Encounters:   18 138/70   18 (!) 141/91   18 (!) 146/91    Weight from Last 3 Encounters:   18 65.8 kg (145 lb)   18 65.8 kg (145 lb)   18 65.1 kg (143 lb 9.6 oz)              We Performed the Following     CBC with platelets differential     INR          Where to get your medicines      These medications were sent to [facility] - [place], [place] - [address] AT [facility] [address]  [address], [place] [address]     Phone:  [phone]     triamcinolone 0.5 % cream         Some of these will need a paper prescription and others can be bought over the counter.  " Ask your nurse if you have questions.     Bring a paper prescription for each of these medications     oxyCODONE IR 15 MG tablet          Primary Care Provider Office Phone # Fax #    Addy Frias [phone] [address] [place]  [place] [address]        Equal Access to Services     [facility] : Hadii aad ku hadasho Soomaali, waaxda luqadaha, qaybta kaalmada adeegyada, waxay genoin hayhildan adesergio issacporfirio dominique . So [facility] [phone].    ATENCIÓN: Si habla español, tiene a barlow disposición servicios gratuitos de asistencia lingüística. [facility] [phone].    We comply with applicable federal civil rights laws and [place] laws. We do not discriminate on the basis of race, color, national origin, age, disability, sex, sexual orientation, or gender identity.            Thank you!     Thank you for choosing [facility] AND [facility]  for your care. Our goal is always to provide you with excellent care. Hearing back from our patients is one way we can continue to improve our services. Please take a few minutes to complete the written survey that you may receive in the mail after your visit with us. Thank you!             Your Updated Medication List - Protect others around you: Learn how to safely use, store and throw away your medicines at www.disposemymeds.org.          This list is accurate as of 2/28/18  2:58 PM.  Always use your most recent med list.                   Brand Name Dispense Instructions for use Diagnosis    acyclovir 400 MG tablet    ZOVIRAX    60 tablet    Take 1 tablet (400 mg) by mouth 2 times daily Viral Prophylaxis.    Multiple myeloma not having achieved remission (H)       CALCIUM CITRATE + PO      Take 2,000 mg by mouth daily 2 tabs        carboxymethylcellulose 0.5 % Soln ophthalmic solution    REFRESH PLUS     1 drop 4 times daily        CLARINEX PO      Take by mouth daily Taking claritin        COMPAZINE PO      Take 10 mg by mouth  daily as needed        cycloSPORINE 0.05 % ophthalmic emulsion    RESTASIS     Place 1 drop into both eyes every 12 hours        DAILY MULTIVITAMIN PO      Take 1 tablet by mouth daily.    Routine general medical examination at a health care facility       dexamethasone 4 MG tablet    DECADRON    28 tablet    Take 20mg (5 tablets) by mouth every week on the morning of velcade injection.    Multiple myeloma not having achieved remission (H)       erythromycin ophthalmic ointment    ROMYCIN     Place 1 Application into both eyes At Bedtime        GENTLE STOOL SOFTENER PO      Take 100 mg by mouth daily        lidocaine-prilocaine cream    EMLA    30 g    Apply topically as needed for moderate pain Apply dollop size amount to port site 30-60 min prior to accessing    Multiple myeloma not having achieved remission (H)       LORazepam 0.5 MG tablet    ATIVAN    30 tablet    Take 1 tablet (0.5 mg) by mouth every 8 hours as needed for anxiety    Multiple myeloma not having achieved remission (H)       metoprolol succinate 50 MG 24 hr tablet    TOPROL XL    270 tablet    Take 2 tablets (100mg) in the morning and 1 tablet (50mg) in the evening by mouth daily    Paroxysmal atrial fibrillation (H)       oxyCODONE IR 15 MG tablet    ROXICODONE    60 tablet    Take 1 tablet (15 mg) by mouth every 8 hours as needed for pain maximum 4 tablet(s) per day    Multiple myeloma not having achieved remission (H)       polyethylene glycol powder    MIRALAX/GLYCOLAX     Take 1 capful by mouth daily as needed    Bilateral leg edema       SIMBRINZA 1-0.2 % ophthalmic suspension   Generic drug:  brinzolamide-brimonidine      Place 1 drop into the right eye 2 times daily 1 drop AM and PM        triamcinolone 0.5 % cream    KENALOG    15 g    Apply sparingly to affected area three times daily. 1-2 weeks , as needed    Rash and nonspecific skin eruption       TYLENOL PO      Take 500 mg by mouth every 6 hours as needed for mild pain or fever         UNABLE TO FIND      MEDICATION NAME: Fresh Coat eye drops        VITAMIN D3 PO      Take 1,000 Units by mouth daily        warfarin 4 MG tablet    COUMADIN    110 tablet    TAKE ONE AND ONE-HALF TABLETS BY MOUTH ON MONDAY, WEDNESDAY, AND FRIDAY AND ONE TABLET THE OTHER DAYS OF THE WEEK    Paroxysmal atrial fibrillation (H), Long-term (current) use of anticoagulants       ZOMETA IV      Inject into the vein every 30 days Every 3 month dosing

## 2018-02-28 NOTE — PROGRESS NOTES
ANTICOAGULATION FOLLOW-UP CLINIC VISIT    Patient Name:  Amira Arreola  Date:  2/28/2018  Contact Type:  Telephone/ No answer at home #.  Voice message left on cell #    SUBJECTIVE:     Patient Findings     Positives No Problem Findings           OBJECTIVE    INR   Date Value Ref Range Status   02/28/2018 2.11 (H) 0.86 - 1.14 Final       ASSESSMENT / PLAN  INR assessment THER    Recheck INR In: 1 WEEK    INR Location Outside lab      Anticoagulation Summary as of 2/28/2018     INR goal 2.0-3.0   Today's INR 2.11   Maintenance plan 6 mg (4 mg x 1.5) on Mon, Fri; 4 mg (4 mg x 1) all other days   Full instructions 6 mg on Mon, Fri; 4 mg all other days   Weekly total 32 mg   No change documented Cintia Powers RN   Plan last modified Tigist Corral RN (1/10/2018)   Next INR check 3/7/2018   Target end date Indefinite    Indications   Long-term (current) use of anticoagulants [Z79.01] [Z79.01]  Atrial fibrillation (H) [I48.91] (Resolved) [I48.91]         Anticoagulation Episode Summary     INR check location     Preferred lab     Send INR reminders to [facility] ANTICOAGULATION    Comments patient have standing INR order to be draw at infusion visit.  advise to call [facility] when have INR draw for dosing instruction.  patient can be reach at home phone [phone]      Anticoagulation Care Providers     Provider Role Specialty Phone number    Addy Frias MD Responsible Internal Medicine [phone]            See the Encounter Report to view Anticoagulation Flowsheet and Dosing Calendar (Go to Encounters tab in chart review, and find the Anticoagulation Therapy Visit)    Dosage adjustment made based on physician directed care plan.    Cintia Powers, SHELLIE

## 2018-02-28 NOTE — MR AVS SNAPSHOT
Amira Arreola   2/28/2018   Anticoagulation Therapy Visit    Description:  85 year old female   Provider:  Addy Frias MD   Department:  [facility]           INR as of 2/28/2018     Today's INR 2.11      Anticoagulation Summary as of 2/28/2018     INR goal 2.0-3.0   Today's INR 2.11   Full instructions 6 mg on Mon, Fri; 4 mg all other days   Next INR check 3/7/2018    Indications   Long-term (current) use of anticoagulants [Z79.01] [Z79.01]  Atrial fibrillation (H) [I48.91] (Resolved) [I48.91]         February 2018 Details    Sun Mon Tue Wed Thu Fri Sat         1               2               3                 4               5               6               7               8               9               10                 11               12               13               14               15               16               17                 18               19               20               21               22               23               24                 25               26               27               28      4 mg   See details          Date Details   02/28 This INR check               How to take your warfarin dose     To take:  4 mg Take 1 of the 4 mg tablets.           March 2018 Details    Sun Mon Tue Wed Thu Fri Sat         1      4 mg         2      6 mg         3      4 mg           4      4 mg         5      6 mg         6      4 mg         7            8               9               10                 11               12               13               14               15               16               17                 18               19               20               21               22               23               24                 25               26               27               28               29               30               31                Date Details   No additional details    Date of next INR:  3/7/2018         How to take your warfarin dose     To  take:  4 mg Take 1 of the 4 mg tablets.    To take:  6 mg Take 1.5 of the 4 mg tablets.

## 2018-02-28 NOTE — PROGRESS NOTES
Infusion Nursing Note:  Amira Arreola presents today for C10D15 Velcade.    Patient seen by provider today: Yes: Dr. Roberts   present during visit today: Not Applicable.    Note: N/A.    Intravenous Access:  Labs drawn without difficulty.  Implanted Port.    Treatment Conditions:  Lab Results   Component Value Date    HGB 12.3 02/28/2018     Lab Results   Component Value Date    WBC 6.4 02/28/2018      Lab Results   Component Value Date    ANEU 5.9 02/28/2018     Lab Results   Component Value Date     02/28/2018      Results reviewed, labs MET treatment parameters, ok to proceed with treatment.      Post Infusion Assessment:  Patient tolerated injection without incident.  Site patent and intact, free from redness, edema or discomfort.  No evidence of extravasations.  Access discontinued per protocol.    Discharge Plan:   Patient declined prescription refills.  Discharge instructions reviewed with: Patient.  Patient and/or family verbalized understanding of discharge instructions and all questions answered.  Copy of AVS reviewed with patient and/or family.  Patient will return 3/7/18 for next appointment.  Patient discharged in stable condition accompanied by: self.  Departure Mode: Ambulatory.    Fanny Bob RN

## 2018-02-28 NOTE — PROGRESS NOTES
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     1.  She had work-up for thrombocytopenia.       - On 09/21/2015, WBC of 4.2, hemoglobin of 13.2 and platelets of 138. CMP normal except mildly low protein of 6.4.   -On 09/29/2015, SPEP does not reveal any M-spike.   - On 10/02/2015, JANET does not reveal any monoclonal protein.     - On 10/22/2015, urine immunofixation reveals monoclonal free kappa light chain.    2. On 05/11/2016, kappa light chain of 50, lambda light chain of 0.32 and ratio of kappa to lambda of 156.2.  3. Skeletal survey on 05/23/2016 does not reveal any lytic lesion.    4. Bone marrow biopsy on 05/25/2016 reveals 40-50% kappa light chain restricted plasma cells.  Cytogenetics is normal. 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    5. MRI of bones on 06/21/2016 and 06/22/2016 reveals myeloma lesions.  6. On 08/24/2016, she was started on revlimid 25 mg 3 weeks on and 1 week off along with dexamethasone 20 mg weekly. Due to cytopenia, dose was subsequently reduced to 15 mg a day. Treatment in between had to be delayed because of cytopenia. She did not have any significant response to treatment.   7. Velcade and dexamethasone started on 03/21/2017.    8. On 03/21/2017, kappa free light chain was 52.5.  It decreased to 41.75 on 04/18/2017.  It  increased to 60.75 on 05/16/2017.    9. Daratumumab added on 05/31/2017.   10.  On 06/28/2017, kappa free light chain is down to 6.43.  11.  On 07/26/2017, kappa free light chain is 13.10.  12.  On 08/23/2017, kappa free light chain is 8.29.  13.  On 09/20/2017, kappa free light chain of 4.17.   14.  On 10/18/2017, kappa free light chain of 2.93.   15.  On 12/13/2017, kappa free light chain of 2.68.   16.  On 01/17/2018, kappa free light chain of 2.35.  17.   On 02/14/2018, kappa free light chain of 2.73     SUBJECTIVE:  Ms. Arreola is an 85-year-old female with kappa free light chain multiple myeloma.  She is currently on daratumumab, Velcade and dexamethasone.  [place] free light chain has been responding well.      Overall, she is tolerating treatment well.  She does not have any neuropathy.  She has fatigue.      REVIEW OF SYSTEMS:   Denies any headache or dizziness.  No ear pain or sore throat.  No chest pain or difficulty breathing.  No abdominal pain, nausea or vomiting.  No urinary complaint. Has some constipation.  No bleeding.  No fever or chills.  Patient has chronic back pain.  Some days are worse than others.  She is on oxycodone.  She wants a refill.      PHYSICAL EXAMINATION:   Alert and oriented x 3.   EYES:  No icterus.   THROAT:  No ulcer or thrush.   NECK:  Supple. No lymphadenopathy.   AXILLAE:  No lymphadenopathy.   LUNGS:  Good air entry bilaterally.  No crackles or wheezing.   HEART:  Regular.  No murmur.   ABDOMEN:  Soft and nontender.  No mass.   EXTREMITIES: Bilateral pedal edema. No calf swelling or tenderness.   SKIN:  There are a few ecchymoses.  No petechia. Patient is on warfarin.     LABORATORY DATA:  Reviewed.      ASSESSMENT:   1.  An 85-year-old female with kappa free light chain multiple myeloma.   2.  Fatigue secondary to her age, myeloma and chemotherapy.   3.  Chronic back pain.      PLAN:   1.  Patient overall is doing well from myeloma.  Her disease so far has been responding to Velcade, daratumumab and dexamethasone.  Labs on 02/14/2018 reveals kappa free light chain of 2.73 and M-spike of 0.1.  Overall, they are stable.      She will continue on Velcade, daratumumab and dexamethasone.  Side effects reviewed.      I told the patient that in the future if we can think of replacing Velcade with either Revlimid or pomalidomide for convenience.  At this time, patient does not mind driving every week.  We will continue her on the same  treatment.      2. Patient has back pain. Prescription of oxycodone refilled.  She uses 1-2 pills a day.      3.  Discussed with her regarding fatigue.  It is multifactorial from her age, myeloma and chemotherapy.  So far, patient is pretty functional.  She is able to take care of herself and drive.        4.  For bone health, she will continue on Zometa every 3 months.      5.  She had multiple questions, which were all answered.  She will be seen back in 1 month.  I advised her to see a physician if she has worsening pain, fever, chills, recurrent infection, bleeding or any other concerns.         ITZ LOPEZ MD             D: 2018   T: 2018   MT: BREE      Name:     ALBERTO PARSON   MRN:      [id number]        Account:      [id number]   :      1932           Visit Date:   2018      Document: [id number]

## 2018-02-28 NOTE — PROGRESS NOTES
"Oncology Rooming Note    February 28, 2018 1:56 PM   Amira Arreola is a 85 year old female who presents for:    Chief Complaint   Patient presents with     Oncology Clinic Visit     Multiple myeloma not having achieved remission      Initial Vitals: /70 (BP Location: Left arm, Patient Position: Sitting, Cuff Size: Adult Regular)  Pulse 78  Temp 98.2  F (36.8  C) (Oral)  Resp 16  SpO2 95% Estimated body mass index is 23.75 kg/(m^2) as calculated from the following:    Height as of 2/21/18: 1.664 m (5' 5.51\").    Weight as of an earlier encounter on 2/28/18: 65.8 kg (145 lb). There is no height or weight on file to calculate BSA.  Moderate Pain (4) Comment: Data Unavailable   No LMP recorded. Patient is postmenopausal.  Allergies reviewed: Yes  Medications reviewed: Yes    Medications: Medication refills not needed today.  Pharmacy name entered into [facility]: [facility] [address] [place] [address] AT [facility] [address]    Clinical concerns: None                              4 minutes for nursing intake (face to face time)     Zora Bentley MA            "

## 2018-02-28 NOTE — Clinical Note
"    2/28/2018         RE: Amira Arreola  [address]  [place] [address]        Dear Colleague,    Thank you for referring your patient, Amira Arreola, to the [facility]. Please see a copy of my visit note below.    Oncology Rooming Note    February 28, 2018 1:56 PM   Amira Arreola is a 85 year old female who presents for:    Chief Complaint   Patient presents with     Oncology Clinic Visit     Multiple myeloma not having achieved remission      Initial Vitals: /70 (BP Location: Left arm, Patient Position: Sitting, Cuff Size: Adult Regular)  Pulse 78  Temp 98.2  F (36.8  C) (Oral)  Resp 16  SpO2 95% Estimated body mass index is 23.75 kg/(m^2) as calculated from the following:    Height as of 2/21/18: 1.664 m (5' 5.51\").    Weight as of an earlier encounter on 2/28/18: 65.8 kg (145 lb). There is no height or weight on file to calculate BSA.  Moderate Pain (4) Comment: Data Unavailable   No LMP recorded. Patient is postmenopausal.  Allergies reviewed: Yes  Medications reviewed: Yes    Medications: Medication refills not needed today.  Pharmacy name entered into [facility]: [id number] [place] - [address] [place] [facility] [address]    Clinical concerns: None                              4 minutes for nursing intake (face to face time)     Zora Bentley MA              Visit Date:   02/28/2018      SUBJECTIVE:  Ms. Arreola is an 85-year-old female with kappa free light chain multiple myeloma.  She is currently on ***, Velcade and dexamethasone.  [place] free light chain has been responding well.      Overall, she is tolerating treatment well.  She does not have any neuropathy.  She has fatigue.      REVIEW OF SYSTEMS:   Denies any headache or dizziness.  No ear pain, sore throat.  No chest pain or difficulty breathing.  No abdominal pain, nausea or vomiting.  No urinary complaint, but some constipation.  No bleeding.  No fever or chills.  The patient " has chronic back pain.  Some days, worse than others.  She is on oxycodone.  She wants a refill.      PHYSICAL EXAMINATION:  Unchanged.      LABORATORY DATA:  Reviewed.      ASSESSMENT:   1.  An 85-year-old female with kappa free light chain multiple myeloma.   2.  Fatigue secondary to her age *** chemotherapy.   3.  Chronic back pain.      PLAN:   1.  The patient overall is doing well from myeloma.  Her disease so far has been responding to Velcade, *** and dexamethasone.  Labs on 2018 reveals kappa free light chain of 2.74.  M spike is 0.1.  Overall, they are stable.      She will continue on Velcade, *** and dexamethasone.  Side effects reviewed.      I told the patient that in the future if we can think of replacing Velcade with either Revlimid or pomalidomide for convenience.  At this time, patient does not mind driving every week.  We will continue on the same treatment.      2.  The patient has back pain.  A prescription of oxycodone refill.  She uses just 1-2 pills a day.      3.  Discussed with her regarding fatigue.  It is multifactorial from her age, myeloma and chemotherapy.  So far, patient is pretty functional.  She is able to take care of herself and drive.        4.  For bone health, she will continue on Zometa every 3 months.      5.  She had multiple questions, which were all answered.  She will be seen back in 1 month.  I advised her to see a physician if she has worsening pain, fever, chills, recurrent infection, bleeding or any other concerns.         ITZ LOPEZ MD             D: 2018   T: 2018   MT: BREE      Name:     ALBERTO PARSON   MRN:      [id number]        Account:      [id number]   :      1932           Visit Date:   2018      Document: [id number]       Again, thank you for allowing me to participate in the care of your patient.        Sincerely,        Itz Lopez MD

## 2018-03-07 ENCOUNTER — INFUSION THERAPY VISIT (OUTPATIENT)
Dept: INFUSION THERAPY | Facility: CLINIC | Age: 83
End: 2018-03-07
Attending: INTERNAL MEDICINE
Payer: MEDICARE

## 2018-03-07 ENCOUNTER — HOSPITAL ENCOUNTER (OUTPATIENT)
Facility: CLINIC | Age: 83
Setting detail: SPECIMEN
Discharge: HOME | End: 2018-03-07
Attending: INTERNAL MEDICINE | Admitting: INTERNAL MEDICINE
Payer: MEDICARE

## 2018-03-07 VITALS
OXYGEN SATURATION: 96 % | TEMPERATURE: 97.7 F | RESPIRATION RATE: 16 BRPM | BODY MASS INDEX: 22.88 KG/M2 | HEIGHT: 66 IN | HEART RATE: 82 BPM | DIASTOLIC BLOOD PRESSURE: 73 MMHG | WEIGHT: 142.4 LBS | SYSTOLIC BLOOD PRESSURE: 113 MMHG

## 2018-03-07 DIAGNOSIS — Z95.828 PORTACATH IN PLACE: ICD-10-CM

## 2018-03-07 DIAGNOSIS — C90.00 MULTIPLE MYELOMA NOT HAVING ACHIEVED REMISSION (H): Primary | ICD-10-CM

## 2018-03-07 DIAGNOSIS — I48.0 PAROXYSMAL ATRIAL FIBRILLATION (H): ICD-10-CM

## 2018-03-07 LAB
BASOPHILS # BLD AUTO: 0 10E9/L (ref 0–0.2)
BASOPHILS NFR BLD AUTO: 0 %
DIFFERENTIAL METHOD BLD: ABNORMAL
EOSINOPHIL # BLD AUTO: 0 10E9/L (ref 0–0.7)
EOSINOPHIL NFR BLD AUTO: 0.1 %
ERYTHROCYTE [DISTWIDTH] IN BLOOD BY AUTOMATED COUNT: 14.2 % (ref 10–15)
HCT VFR BLD AUTO: 37.9 % (ref 35–47)
HGB BLD-MCNC: 12.7 G/DL (ref 11.7–15.7)
IMM GRANULOCYTES # BLD: 0 10E9/L (ref 0–0.4)
IMM GRANULOCYTES NFR BLD: 0.3 %
INR PPP: 1.86 (ref 0.86–1.14)
LYMPHOCYTES # BLD AUTO: 0.4 10E9/L (ref 0.8–5.3)
LYMPHOCYTES NFR BLD AUTO: 5.7 %
MCH RBC QN AUTO: 33.2 PG (ref 26.5–33)
MCHC RBC AUTO-ENTMCNC: 33.5 G/DL (ref 31.5–36.5)
MCV RBC AUTO: 99 FL (ref 78–100)
MONOCYTES # BLD AUTO: 0.1 10E9/L (ref 0–1.3)
MONOCYTES NFR BLD AUTO: 1.7 %
NEUTROPHILS # BLD AUTO: 7.1 10E9/L (ref 1.6–8.3)
NEUTROPHILS NFR BLD AUTO: 92.2 %
NRBC # BLD AUTO: 0 10*3/UL
NRBC BLD AUTO-RTO: 0 /100
PLATELET # BLD AUTO: 133 10E9/L (ref 150–450)
RBC # BLD AUTO: 3.83 10E12/L (ref 3.8–5.2)
WBC # BLD AUTO: 7.7 10E9/L (ref 4–11)

## 2018-03-07 PROCEDURE — 85610 PROTHROMBIN TIME: CPT | Performed by: INTERNAL MEDICINE

## 2018-03-07 PROCEDURE — 96401 CHEMO ANTI-NEOPL SQ/IM: CPT

## 2018-03-07 PROCEDURE — 25000128 H RX IP 250 OP 636: Performed by: INTERNAL MEDICINE

## 2018-03-07 PROCEDURE — 85025 COMPLETE CBC W/AUTO DIFF WBC: CPT | Performed by: INTERNAL MEDICINE

## 2018-03-07 RX ORDER — HEPARIN SODIUM (PORCINE) LOCK FLUSH IV SOLN 100 UNIT/ML 100 UNIT/ML
500 SOLUTION INTRAVENOUS EVERY 8 HOURS
Status: DISCONTINUED | OUTPATIENT
Start: 2018-03-07 | End: 2018-03-07 | Stop reason: HOSPADM

## 2018-03-07 RX ORDER — HEPARIN SODIUM (PORCINE) LOCK FLUSH IV SOLN 100 UNIT/ML 100 UNIT/ML
500 SOLUTION INTRAVENOUS EVERY 8 HOURS
Status: CANCELLED
Start: 2018-03-07

## 2018-03-07 RX ADMIN — BORTEZOMIB 2.3 MG: 3.5 INJECTION, POWDER, LYOPHILIZED, FOR SOLUTION INTRAVENOUS; SUBCUTANEOUS at 11:09

## 2018-03-07 RX ADMIN — HEPARIN 500 UNITS: 100 SYRINGE at 11:11

## 2018-03-07 ASSESSMENT — PAIN SCALES - GENERAL: PAINLEVEL: NO PAIN (0)

## 2018-03-07 NOTE — PROGRESS NOTES
Infusion Nursing Note:  Amira Arreola presents today for C10D22 Velcade.    Patient seen by provider today: No   present during visit today: Not Applicable.    Note: N/A.    Intravenous Access:  Labs drawn without difficulty.  Implanted Port.    Treatment Conditions:  Lab Results   Component Value Date    HGB 12.7 03/07/2018     Lab Results   Component Value Date    WBC 7.7 03/07/2018      Lab Results   Component Value Date    ANEU 7.1 03/07/2018     Lab Results   Component Value Date     03/07/2018      Results reviewed, labs MET treatment parameters, ok to proceed with treatment.      Post Infusion Assessment:  Patient tolerated injection without incident.  Site patent and intact, free from redness, edema or discomfort.  No evidence of extravasations.  Access discontinued per protocol.    Discharge Plan:   Patient declined prescription refills.  Discharge instructions reviewed with: Patient.  Patient and/or family verbalized understanding of discharge instructions and all questions answered.  Copy of AVS reviewed with patient and/or family.  Patient will return 3/14/18 for next appointment.  Patient discharged in stable condition accompanied by: self.  Departure Mode: Ambulatory.    Fanny Bob RN

## 2018-03-07 NOTE — MR AVS SNAPSHOT
After Visit Summary   3/7/2018    Amira Arreola    MRN: [id number]           Patient Information     Date Of Birth          7/17/1932        Visit Information        Provider Department      3/7/2018 10:30 AM  INFUSION CHAIR 17 [facility]        Today's Diagnoses     Multiple myeloma not having achieved remission (H)    -  1    Paroxysmal atrial fibrillation (H)           Follow-ups after your visit        Your next 10 appointments already scheduled     Mar 14, 2018 10:00 AM CDT   Level 2 with  INFUSION CHAIR 16   [facility] ([facility])    [facility]  [address]  [place] [address]   [phone]            Mar 21, 2018 10:00 AM CDT   Level 2 with  INFUSION CHAIR 20   [facility] ([facility])    [facility]  [address]  [place] [address]   [phone]            Mar 28, 2018  8:30 AM CDT   Level 2 with  INFUSION CHAIR 10   [facility] ([facility])    [facility]  [address]  [place] [address]   [phone]            Mar 28, 2018  9:00 AM CDT   Return Visit with Shayne Roberts MD   [facility] ([facility])    [facility]  [address]  [place] [address]   [phone]            Apr 30, 2018 10:15 AM CDT   Return Visit with Ramos Rivera MD   [facility] ([facility])    [address]  [place] [address] [phone]              Who to contact     If you have questions or need follow up information about today's clinic visit or your schedule please contact [facility] directly at [phone].  Normal or non-critical lab and imaging results  "will be communicated to you by MyChart, letter or phone within 4 business days after the clinic has received the results. If you do not hear from us within 7 days, please contact the clinic through Swapbox or phone. If you have a critical or abnormal lab result, we will notify you by phone as soon as possible.  Submit refill requests through Swapbox or call your pharmacy and they will forward the refill request to us. Please allow 3 business days for your refill to be completed.          Additional Information About Your Visit        Swapbox Information     Swapbox lets you send messages to your doctor, view your test results, renew your prescriptions, schedule appointments and more. To sign up, go to [email or web address] . Click on \"Log in\" on the left side of the screen, which will take you to the Welcome page. Then click on \"Sign up Now\" on the right side of the page.     You will be asked to enter the access code listed below, as well as some personal information. Please follow the directions to create your username and password.     Your access code is: [email or web address]  Expires: 4/3/2018  3:04 PM     Your access code will  in 90 days. If you need help or a new code, please call your [place] clinic or [phone].        Care EveryWhere ID     This is your Care EveryWhere ID. This could be used by other organizations to access your [place] medical records  [id number]        Your Vitals Were     Pulse Temperature Respirations Height Pulse Oximetry BMI (Body Mass Index)    82 97.7  F (36.5  C) (Oral) 16 1.664 m (5' 5.5\") 96% 23.34 kg/m2       Blood Pressure from Last 3 Encounters:   18 113/73   18 138/70   18 (!) 141/91    Weight from Last 3 Encounters:   18 64.6 kg (142 lb 6.4 oz)   18 65.8 kg (145 lb)   18 65.8 kg (145 lb)              We Performed the Following     CBC with platelets differential     INR        Primary Care Provider Office Phone # Fax #    Addy " Sean Frias [phone]       [address] [facility] [address]  [facility] [address]        Equal Access to Services     MICHAELHARINI GARCIA : Hadmicaela liane murcia nichol Otooleali, rhonda iselaalexis, dahlia kawen gupta, hung hernandezmorteza doroteo. So [facility] [phone].    ATENCIÓN: Si habla español, tiene a barlow disposición servicios gratuitos de asistencia lingüística. Llame al [phone].    We comply with applicable federal civil rights laws and [place] laws. We do not discriminate on the basis of race, color, national origin, age, disability, sex, sexual orientation, or gender identity.            Thank you!     Thank you for choosing [facility]  for your care. Our goal is always to provide you with excellent care. Hearing back from our patients is one way we can continue to improve our services. Please take a few minutes to complete the written survey that you may receive in the mail after your visit with us. Thank you!             Your Updated Medication List - Protect others around you: Learn how to safely use, store and throw away your medicines at www.disposemymeds.org.          This list is accurate as of 3/7/18 10:39 AM.  Always use your most recent med list.                   Brand Name Dispense Instructions for use Diagnosis    acyclovir 400 MG tablet    ZOVIRAX    60 tablet    Take 1 tablet (400 mg) by mouth 2 times daily Viral Prophylaxis.    Multiple myeloma not having achieved remission (H)       CALCIUM CITRATE + PO      Take 2,000 mg by mouth daily 2 tabs        carboxymethylcellulose 0.5 % Soln ophthalmic solution    REFRESH PLUS     1 drop 4 times daily        CLARINEX PO      Take by mouth daily Taking claritin        COMPAZINE PO      Take 10 mg by mouth daily as needed        cycloSPORINE 0.05 % ophthalmic emulsion    RESTASIS     Place 1 drop into both eyes every 12 hours        DAILY MULTIVITAMIN PO      Take 1 tablet by mouth daily.     Routine general medical examination at a health care facility       dexamethasone 4 MG tablet    DECADRON    28 tablet    Take 20mg (5 tablets) by mouth every week on the morning of velcade injection.    Multiple myeloma not having achieved remission (H)       erythromycin ophthalmic ointment    ROMYCIN     Place 1 Application into both eyes At Bedtime        GENTLE STOOL SOFTENER PO      Take 100 mg by mouth daily        lidocaine-prilocaine cream    EMLA    30 g    Apply topically as needed for moderate pain Apply dollop size amount to port site 30-60 min prior to accessing    Multiple myeloma not having achieved remission (H)       LORazepam 0.5 MG tablet    ATIVAN    30 tablet    Take 1 tablet (0.5 mg) by mouth every 8 hours as needed for anxiety    Multiple myeloma not having achieved remission (H)       metoprolol succinate 50 MG 24 hr tablet    TOPROL XL    270 tablet    Take 2 tablets (100mg) in the morning and 1 tablet (50mg) in the evening by mouth daily    Paroxysmal atrial fibrillation (H)       oxyCODONE IR 15 MG tablet    ROXICODONE    60 tablet    Take 1 tablet (15 mg) by mouth every 8 hours as needed for pain maximum 4 tablet(s) per day    Multiple myeloma not having achieved remission (H)       polyethylene glycol powder    MIRALAX/GLYCOLAX     Take 1 capful by mouth daily as needed    Bilateral leg edema       SIMBRINZA 1-0.2 % ophthalmic suspension   Generic drug:  brinzolamide-brimonidine      Place 1 drop into the right eye 2 times daily 1 drop AM and PM        triamcinolone 0.5 % cream    KENALOG    15 g    Apply sparingly to affected area three times daily. 1-2 weeks , as needed    Rash and nonspecific skin eruption       TYLENOL PO      Take 500 mg by mouth every 6 hours as needed for mild pain or fever        UNABLE TO FIND      MEDICATION NAME: Fresh Coat eye drops        VITAMIN D3 PO      Take 1,000 Units by mouth daily        warfarin 4 MG tablet    COUMADIN    110 tablet    TAKE ONE AND  ONE-HALF TABLETS BY MOUTH ON MONDAY, WEDNESDAY, AND FRIDAY AND ONE TABLET THE OTHER DAYS OF THE WEEK    Paroxysmal atrial fibrillation (H), Long-term (current) use of anticoagulants       ZOMETA IV      Inject into the vein every 30 days Every 3 month dosing

## 2018-03-08 ENCOUNTER — ANTICOAGULATION THERAPY VISIT (OUTPATIENT)
Dept: FAMILY MEDICINE | Facility: CLINIC | Age: 83
End: 2018-03-08
Payer: COMMERCIAL

## 2018-03-08 DIAGNOSIS — Z79.01 LONG-TERM (CURRENT) USE OF ANTICOAGULANTS: ICD-10-CM

## 2018-03-08 PROCEDURE — 99207 ZZC NO CHARGE NURSE ONLY: CPT | Performed by: INTERNAL MEDICINE

## 2018-03-08 NOTE — PROGRESS NOTES
"  ANTICOAGULATION FOLLOW-UP CLINIC VISIT    Patient Name:  Amira Arreola  Date:  3/8/2018  Contact Type:  Telephone/ called and spoke with pt.      SUBJECTIVE:     Patient Findings     Positives Missed doses    Comments Called patient with yesterday's INR in clinic lab:  1.86  Patient states she \"doses herself\" based on her diet.  Also reports missing \"some doses in the past week\".  Says she splits the 4mg tab and takes 2mg BID \"so that I don't bleed all over the place\".  \"If my bandaid can't stay on and slips off, then I don't take my pills\".    Tried to encourage patient to keep records regarding the Warfarin dosing she's taking so that we can better help with dosing going forward.  Patient refused to do that, saying she bases her daily dose on her diet and bleeding symptoms.    Patient also said she doesn't like taking 6mg \"at all\" due to bleeding concerns.  Maintenance dose has been 6mg MF, 4mg ROW.    Advised patient take 6mh once per week, Friday and 4mg ROW.  Patient said she'd \"try\".   Patient has INR done on Wed at infusion center.            OBJECTIVE    INR   Date Value Ref Range Status   03/07/2018 1.86 (H) 0.86 - 1.14 Final       ASSESSMENT / PLAN  No question data found.  Anticoagulation Summary as of 3/8/2018     INR goal 2.0-3.0   Today's INR 1.86! (3/7/2018)   Maintenance plan 6 mg (4 mg x 1.5) on Mon, Fri; 4 mg (4 mg x 1) all other days   Full instructions 3/12: 4 mg; Otherwise 6 mg on Mon, Fri; 4 mg all other days   Weekly total 32 mg   Plan last modified Tigist Corral, RN (1/10/2018)   Next INR check 3/14/2018   Target end date Indefinite    Indications   Long-term (current) use of anticoagulants [Z79.01] [Z79.01]  Atrial fibrillation (H) [I48.91] (Resolved) [I48.91]         Anticoagulation Episode Summary     INR check location     Preferred lab     Send INR reminders to [facility] ANTICOAGULATION    Comments patient have standing INR order to be draw at infusion visit.  advise to call [facility] when " have INR draw for dosing instruction.  patient can be reach at home phone [phone]      Anticoagulation Care Providers     Provider Role Specialty Phone number    Addy Frias MD [facility] [phone]            See the Encounter Report to view Anticoagulation Flowsheet and Dosing Calendar (Go to Encounters tab in chart review, and find the Anticoagulation Therapy Visit)    Dosage adjustment made based on physician directed care plan.    Bri Mcbride RN

## 2018-03-08 NOTE — MR AVS SNAPSHOT
Amira IVY Arreola   3/8/2018   Anticoagulation Therapy Visit    Description:  85 year old female   Provider:  Addy Frias MD   Department:  [facility]           INR as of 3/8/2018     Today's INR 1.86! (3/7/2018)      Anticoagulation Summary as of 3/8/2018     INR goal 2.0-3.0   Today's INR 1.86! (3/7/2018)   Full instructions 3/12: 4 mg; Otherwise 6 mg on Mon, Fri; 4 mg all other days   Next INR check 3/14/2018    Indications   Long-term (current) use of anticoagulants [Z79.01] [Z79.01]  Atrial fibrillation (H) [I48.91] (Resolved) [I48.91]         March 2018 Details    Sun Mon Tue Wed Thu Fri Sat         1               2               3                 4               5               6               7               8      4 mg   See details      9      6 mg         10      4 mg           11      4 mg         12      4 mg         13      4 mg         14            15               16               17                 18               19               20               21               22               23               24                 25               26               27               28               29               30               31                Date Details   03/08 This INR check       Date of next INR:  3/14/2018         How to take your warfarin dose     To take:  4 mg Take 1 of the 4 mg tablets.    To take:  6 mg Take 1.5 of the 4 mg tablets.

## 2018-03-14 ENCOUNTER — HOSPITAL ENCOUNTER (OUTPATIENT)
Facility: CLINIC | Age: 83
Setting detail: SPECIMEN
Discharge: HOME | End: 2018-03-14
Attending: INTERNAL MEDICINE | Admitting: INTERNAL MEDICINE
Payer: MEDICARE

## 2018-03-14 ENCOUNTER — ANTICOAGULATION THERAPY VISIT (OUTPATIENT)
Dept: FAMILY MEDICINE | Facility: CLINIC | Age: 83
End: 2018-03-14
Payer: COMMERCIAL

## 2018-03-14 ENCOUNTER — INFUSION THERAPY VISIT (OUTPATIENT)
Dept: INFUSION THERAPY | Facility: CLINIC | Age: 83
End: 2018-03-14
Attending: INTERNAL MEDICINE
Payer: MEDICARE

## 2018-03-14 VITALS
TEMPERATURE: 97.8 F | BODY MASS INDEX: 23.82 KG/M2 | DIASTOLIC BLOOD PRESSURE: 73 MMHG | HEART RATE: 84 BPM | RESPIRATION RATE: 16 BRPM | SYSTOLIC BLOOD PRESSURE: 141 MMHG | HEIGHT: 66 IN | OXYGEN SATURATION: 97 % | WEIGHT: 148.2 LBS

## 2018-03-14 DIAGNOSIS — I48.0 PAROXYSMAL ATRIAL FIBRILLATION (H): ICD-10-CM

## 2018-03-14 DIAGNOSIS — C90.00 MULTIPLE MYELOMA NOT HAVING ACHIEVED REMISSION (H): Primary | ICD-10-CM

## 2018-03-14 DIAGNOSIS — Z95.828 PORTACATH IN PLACE: ICD-10-CM

## 2018-03-14 LAB
ALBUMIN SERPL-MCNC: 3.4 G/DL (ref 3.4–5)
ALP SERPL-CCNC: 54 U/L (ref 40–150)
ALT SERPL W P-5'-P-CCNC: 33 U/L (ref 0–50)
AST SERPL W P-5'-P-CCNC: 32 U/L (ref 0–45)
BASOPHILS # BLD AUTO: 0 10E9/L (ref 0–0.2)
BASOPHILS NFR BLD AUTO: 0 %
BILIRUB DIRECT SERPL-MCNC: 0.1 MG/DL (ref 0–0.2)
BILIRUB SERPL-MCNC: 0.5 MG/DL (ref 0.2–1.3)
DIFFERENTIAL METHOD BLD: ABNORMAL
EOSINOPHIL # BLD AUTO: 0 10E9/L (ref 0–0.7)
EOSINOPHIL NFR BLD AUTO: 0.1 %
ERYTHROCYTE [DISTWIDTH] IN BLOOD BY AUTOMATED COUNT: 14.4 % (ref 10–15)
HCT VFR BLD AUTO: 36.4 % (ref 35–47)
HGB BLD-MCNC: 12.2 G/DL (ref 11.7–15.7)
IMM GRANULOCYTES # BLD: 0 10E9/L (ref 0–0.4)
IMM GRANULOCYTES NFR BLD: 0.3 %
INR PPP: 2.41 (ref 0.86–1.14)
LYMPHOCYTES # BLD AUTO: 0.4 10E9/L (ref 0.8–5.3)
LYMPHOCYTES NFR BLD AUTO: 5.1 %
MCH RBC QN AUTO: 33.4 PG (ref 26.5–33)
MCHC RBC AUTO-ENTMCNC: 33.5 G/DL (ref 31.5–36.5)
MCV RBC AUTO: 100 FL (ref 78–100)
MONOCYTES # BLD AUTO: 0.2 10E9/L (ref 0–1.3)
MONOCYTES NFR BLD AUTO: 2.8 %
NEUTROPHILS # BLD AUTO: 6.8 10E9/L (ref 1.6–8.3)
NEUTROPHILS NFR BLD AUTO: 91.7 %
NRBC # BLD AUTO: 0 10*3/UL
NRBC BLD AUTO-RTO: 0 /100
PLATELET # BLD AUTO: 120 10E9/L (ref 150–450)
PROT SERPL-MCNC: 6.1 G/DL (ref 6.8–8.8)
RBC # BLD AUTO: 3.65 10E12/L (ref 3.8–5.2)
WBC # BLD AUTO: 7.4 10E9/L (ref 4–11)

## 2018-03-14 PROCEDURE — 83883 ASSAY NEPHELOMETRY NOT SPEC: CPT | Performed by: INTERNAL MEDICINE

## 2018-03-14 PROCEDURE — 85610 PROTHROMBIN TIME: CPT | Performed by: INTERNAL MEDICINE

## 2018-03-14 PROCEDURE — 96413 CHEMO IV INFUSION 1 HR: CPT

## 2018-03-14 PROCEDURE — 00000402 ZZHCL STATISTIC TOTAL PROTEIN: Performed by: INTERNAL MEDICINE

## 2018-03-14 PROCEDURE — 96375 TX/PRO/DX INJ NEW DRUG ADDON: CPT

## 2018-03-14 PROCEDURE — A9270 NON-COVERED ITEM OR SERVICE: HCPCS | Mod: GY | Performed by: INTERNAL MEDICINE

## 2018-03-14 PROCEDURE — 84165 PROTEIN E-PHORESIS SERUM: CPT | Performed by: INTERNAL MEDICINE

## 2018-03-14 PROCEDURE — 25000128 H RX IP 250 OP 636: Performed by: INTERNAL MEDICINE

## 2018-03-14 PROCEDURE — 85025 COMPLETE CBC W/AUTO DIFF WBC: CPT | Performed by: INTERNAL MEDICINE

## 2018-03-14 PROCEDURE — 80076 HEPATIC FUNCTION PANEL: CPT | Performed by: INTERNAL MEDICINE

## 2018-03-14 PROCEDURE — 25000132 ZZH RX MED GY IP 250 OP 250 PS 637: Mod: GY | Performed by: INTERNAL MEDICINE

## 2018-03-14 PROCEDURE — 96415 CHEMO IV INFUSION ADDL HR: CPT

## 2018-03-14 PROCEDURE — 96401 CHEMO ANTI-NEOPL SQ/IM: CPT

## 2018-03-14 RX ORDER — HEPARIN SODIUM (PORCINE) LOCK FLUSH IV SOLN 100 UNIT/ML 100 UNIT/ML
500 SOLUTION INTRAVENOUS EVERY 8 HOURS
Status: CANCELLED
Start: 2018-03-14

## 2018-03-14 RX ORDER — HEPARIN SODIUM (PORCINE) LOCK FLUSH IV SOLN 100 UNIT/ML 100 UNIT/ML
500 SOLUTION INTRAVENOUS EVERY 8 HOURS
Status: DISCONTINUED | OUTPATIENT
Start: 2018-03-14 | End: 2018-03-14 | Stop reason: HOSPADM

## 2018-03-14 RX ORDER — DIPHENHYDRAMINE HCL 25 MG
50 CAPSULE ORAL ONCE
Status: COMPLETED | OUTPATIENT
Start: 2018-03-14 | End: 2018-03-14

## 2018-03-14 RX ORDER — ACETAMINOPHEN 325 MG/1
650 TABLET ORAL ONCE
Status: COMPLETED | OUTPATIENT
Start: 2018-03-14 | End: 2018-03-14

## 2018-03-14 RX ORDER — DEXAMETHASONE 4 MG/1
TABLET ORAL
Qty: 28 TABLET | Refills: 0 | Status: SHIPPED | OUTPATIENT
Start: 2018-03-14 | End: 2018-03-21

## 2018-03-14 RX ADMIN — SODIUM CHLORIDE 250 ML: 9 INJECTION, SOLUTION INTRAVENOUS at 11:16

## 2018-03-14 RX ADMIN — DIPHENHYDRAMINE HYDROCHLORIDE 50 MG: 25 CAPSULE ORAL at 11:14

## 2018-03-14 RX ADMIN — ACETAMINOPHEN 650 MG: 325 TABLET ORAL at 11:14

## 2018-03-14 RX ADMIN — DEXAMETHASONE SODIUM PHOSPHATE 12 MG: 10 INJECTION, SOLUTION INTRAMUSCULAR; INTRAVENOUS at 11:16

## 2018-03-14 RX ADMIN — DARATUMUMAB 1000 MG: 100 INJECTION, SOLUTION, CONCENTRATE INTRAVENOUS at 11:39

## 2018-03-14 RX ADMIN — HEPARIN 500 UNITS: 100 SYRINGE at 14:51

## 2018-03-14 RX ADMIN — BORTEZOMIB 2.3 MG: 3.5 INJECTION, POWDER, LYOPHILIZED, FOR SOLUTION INTRAVENOUS; SUBCUTANEOUS at 14:51

## 2018-03-14 ASSESSMENT — PAIN SCALES - GENERAL: PAINLEVEL: NO PAIN (0)

## 2018-03-14 NOTE — MR AVS SNAPSHOT
After Visit Summary   3/14/2018    Amira Arreola    MRN: [id number]           Patient Information     Date Of Birth          7/17/1932        Visit Information        Provider Department      3/14/2018 10:00 AM  INFUSION CHAIR 16 [facility]        Today's Diagnoses     Multiple myeloma not having achieved remission (H)    -  1    Paroxysmal atrial fibrillation (H)        Portacath in place           Follow-ups after your visit        Your next 10 appointments already scheduled     Mar 21, 2018 10:00 AM CDT   Level 2 with  INFUSION CHAIR 20   [facility] ([facility])    [facility]  [address]  [place] [address]   [phone]            Mar 28, 2018  8:30 AM CDT   Level 2 with  INFUSION CHAIR 10   [facility] ([facility])    [facility]  [address]  [place] [address]   [phone]            Mar 28, 2018  9:00 AM CDT   Return Visit with Shayne Roberts MD   [facility] ([facility])    [facility]  [address]  [place] [address] [phone]            Apr 30, 2018 10:15 AM CDT   Return Visit with Ramos Rivera MD   [facility] ([facility])    [address]  [place] [address] [phone]              Who to contact     If you have questions or need follow up information about today's clinic visit or your schedule please contact [facility] directly at [phone].  Normal or non-critical lab and imaging results will be communicated to you by MyChart, letter or phone within 4 business days after the clinic has received the results. If you do not hear from us within 7 days, please contact the clinic through MyChart or phone. If  "you have a critical or abnormal lab result, we will notify you by phone as soon as possible.  Submit refill requests through Instabank or call your pharmacy and they will forward the refill request to us. Please allow 3 business days for your refill to be completed.          Additional Information About Your Visit        [employer] Information     Instabank lets you send messages to your doctor, view your test results, renew your prescriptions, schedule appointments and more. To sign up, go to [email or web address] . Click on \"Log in\" on the left side of the screen, which will take you to the Welcome page. Then click on \"Sign up Now\" on the right side of the page.     You will be asked to enter the access code listed below, as well as some personal information. Please follow the directions to create your username and password.     Your access code is: [email or web address]  Expires: 4/3/2018  4:04 PM     Your access code will  in 90 days. If you need help or a new code, please call your [place] clinic or [phone].        Care EveryWhere ID     This is your Care EveryWhere ID. This could be used by other organizations to access your [place] medical records  [id number]        Your Vitals Were     Pulse Temperature Respirations Height Pulse Oximetry BMI (Body Mass Index)    84 97.8  F (36.6  C) (Oral) 16 1.664 m (5' 5.51\") 97% 24.28 kg/m2       Blood Pressure from Last 3 Encounters:   18 141/73   18 113/73   18 138/70    Weight from Last 3 Encounters:   18 67.2 kg (148 lb 3.2 oz)   18 64.6 kg (142 lb 6.4 oz)   18 65.8 kg (145 lb)              We Performed the Following     CBC with platelets differential     Hepatic panel     INR     Kappa and lambda light chain     Protein electrophoresis          Today's Medication Changes          These changes are accurate as of 3/14/18  2:57 PM.  If you have any questions, ask your nurse or doctor.               These medicines have changed or " have updated prescriptions.        Dose/Directions    * dexamethasone 4 MG tablet   Commonly known as:  DECADRON   This may have changed:  Another medication with the same name was added. Make sure you understand how and when to take each.   Used for:  Multiple myeloma not having achieved remission (H)        Take 20mg (5 tablets) by mouth every week on the morning of velcade injection.   Quantity:  28 tablet   Refills:  0       * dexamethasone 4 MG tablet   Commonly known as:  DECADRON   This may have changed:  You were already taking a medication with the same name, and this prescription was added. Make sure you understand how and when to take each.   Used for:  Multiple myeloma not having achieved remission (H)        Take 20mg (5 tablets) by mouth every week on the morning of velcade injection.   Quantity:  28 tablet   Refills:  0       * Notice:  This list has 2 medication(s) that are the same as other medications prescribed for you. Read the directions carefully, and ask your doctor or other care provider to review them with you.         Where to get your medicines      [facility] [address] [place] [address] [facility] [address] [facility] [address]  [address] [facility] [address], [place] [address]     Phone:  [phone]     dexamethasone 4 MG tablet                Primary Care Provider Office Phone # Fax #    Addy Frias [phone] [address] [facility] [address]  [facility] [address]        Equal Access to Services     HARINI [facility] Hospital[facility] AH: Hadii liane murcia hadasho Soyair, waaxda luqadaha, qaybta kaalmada adesergioyada, hung dominique . So [facility] [phone].    ATENCIÓN: Si habla español, tiene a barlow disposición servicios gratuitos de asistencia lingüística. Llame al [phone].    We comply with applicable federal civil rights laws and [place] laws. We do not discriminate on the basis of race, color, national origin, age, disability, sex,  sexual orientation, or gender identity.            Thank you!     Thank you for choosing [facility]  for your care. Our goal is always to provide you with excellent care. Hearing back from our patients is one way we can continue to improve our services. Please take a few minutes to complete the written survey that you may receive in the mail after your visit with us. Thank you!             Your Updated Medication List - Protect others around you: Learn how to safely use, store and throw away your medicines at www.disposemymeds.org.          This list is accurate as of 3/14/18  2:57 PM.  Always use your most recent med list.                   Brand Name Dispense Instructions for use Diagnosis    acyclovir 400 MG tablet    ZOVIRAX    60 tablet    Take 1 tablet (400 mg) by mouth 2 times daily Viral Prophylaxis.    Multiple myeloma not having achieved remission (H)       CALCIUM CITRATE + PO      Take 2,000 mg by mouth daily 2 tabs        carboxymethylcellulose 0.5 % Soln ophthalmic solution    REFRESH PLUS     1 drop 4 times daily        CLARINEX PO      Take by mouth daily Taking claritin        COMPAZINE PO      Take 10 mg by mouth daily as needed        cycloSPORINE 0.05 % ophthalmic emulsion    RESTASIS     Place 1 drop into both eyes every 12 hours        DAILY MULTIVITAMIN PO      Take 1 tablet by mouth daily.    Routine general medical examination at a health care facility       * dexamethasone 4 MG tablet    DECADRON    28 tablet    Take 20mg (5 tablets) by mouth every week on the morning of velcade injection.    Multiple myeloma not having achieved remission (H)       * dexamethasone 4 MG tablet    DECADRON    28 tablet    Take 20mg (5 tablets) by mouth every week on the morning of velcade injection.    Multiple myeloma not having achieved remission (H)       erythromycin ophthalmic ointment    ROMYCIN     Place 1 Application into both eyes At Bedtime         lidocaine-prilocaine cream    EMLA    30 g    Apply topically as needed for moderate pain Apply dollop size amount to port site 30-60 min prior to accessing    Multiple myeloma not having achieved remission (H)       LORazepam 0.5 MG tablet    ATIVAN    30 tablet    Take 1 tablet (0.5 mg) by mouth every 8 hours as needed for anxiety    Multiple myeloma not having achieved remission (H)       metoprolol succinate 50 MG 24 hr tablet    TOPROL XL    270 tablet    Take 2 tablets (100mg) in the morning and 1 tablet (50mg) in the evening by mouth daily    Paroxysmal atrial fibrillation (H)       oxyCODONE IR 15 MG tablet    ROXICODONE    60 tablet    Take 1 tablet (15 mg) by mouth every 8 hours as needed for pain maximum 4 tablet(s) per day    Multiple myeloma not having achieved remission (H)       polyethylene glycol powder    MIRALAX/GLYCOLAX     Take 1 capful by mouth daily as needed    Bilateral leg edema       SIMBRINZA 1-0.2 % ophthalmic suspension   Generic drug:  brinzolamide-brimonidine      Place 1 drop into the right eye 2 times daily 1 drop AM and PM        triamcinolone 0.5 % cream    KENALOG    15 g    Apply sparingly to affected area three times daily. 1-2 weeks , as needed    Rash and nonspecific skin eruption       TYLENOL PO      Take 500 mg by mouth every 6 hours as needed for mild pain or fever        UNABLE TO FIND      MEDICATION NAME: Fresh Coat eye drops        VITAMIN D3 PO      Take 1,000 Units by mouth daily        warfarin 4 MG tablet    COUMADIN    110 tablet    TAKE ONE AND ONE-HALF TABLETS BY MOUTH ON MONDAY, WEDNESDAY, AND FRIDAY AND ONE TABLET THE OTHER DAYS OF THE WEEK    Paroxysmal atrial fibrillation (H), Long-term (current) use of anticoagulants       ZOMETA IV      Inject into the vein every 30 days Every 3 month dosing        * Notice:  This list has 2 medication(s) that are the same as other medications prescribed for you. Read the directions carefully, and ask your doctor or other  care provider to review them with you.

## 2018-03-14 NOTE — PROGRESS NOTES
Infusion Nursing Note:  Amira Arreola presents today for C11 D1 Darzalex, Velcade.    Patient seen by provider today: No   present during visit today: Not Applicable.    Note: Refer to doc flowsheet for assessment, no new concerns today. Patient verified she is taking oral dex and acyclovir as ordered, and took her dex dose this morning. Verbalized understanding of oral dex schedule. Refill on dexamethasone sent to [facility] per patient request.    Intravenous Access:  Labs drawn without difficulty.  Implanted Port.    Treatment Conditions:  Lab Results   Component Value Date    HGB 12.2 03/14/2018     Lab Results   Component Value Date    WBC 7.4 03/14/2018      Lab Results   Component Value Date    ANEU 6.8 03/14/2018     Lab Results   Component Value Date     03/14/2018      Lab Results   Component Value Date    BILITOTAL 0.5 03/14/2018           Lab Results   Component Value Date    ALBUMIN 3.4 03/14/2018                    Lab Results   Component Value Date    ALT 33 03/14/2018           Lab Results   Component Value Date    AST 32 03/14/2018       Results reviewed, labs MET treatment parameters, ok to proceed with treatment.      Post Infusion Assessment:  Patient tolerated infusion without incident.  Patient tolerated injection without incident. Velcade given SQ in left mid abdomen  Blood return noted pre and post infusion.  Site patent and intact, free from redness, edema or discomfort.  No evidence of extravasations.  Access discontinued per protocol.    Discharge Plan:   Discharge instructions reviewed with: Patient.  Patient and/or family verbalized understanding of discharge instructions and all questions answered.  Copy of AVS reviewed with patient and/or family.  Patient will return 3/21 for next appointment.  Patient discharged in stable condition accompanied by: son.  Departure Mode: Ambulatory.    Allyn Corona RN

## 2018-03-14 NOTE — PROGRESS NOTES
ANTICOAGULATION FOLLOW-UP CLINIC VISIT    Patient Name:  Amira Arreola  Date:  3/14/2018  Contact Type:  Telephone    SUBJECTIVE:     Patient Findings     Positives No Problem Findings           OBJECTIVE    INR   Date Value Ref Range Status   03/14/2018 2.41 (H) 0.86 - 1.14 Final       ASSESSMENT / PLAN  INR assessment THER    Recheck INR In: 1 WEEK    INR Location Outside lab      Anticoagulation Summary as of 3/14/2018     INR goal 2.0-3.0   Today's INR 2.41   Maintenance plan 6 mg (4 mg x 1.5) on Mon, Fri; 4 mg (4 mg x 1) all other days   Full instructions 6 mg on Mon, Fri; 4 mg all other days   Weekly total 32 mg   No change documented Tigist Corral, SHELLIE   Plan last modified Tigist Corral RN (1/10/2018)   Next INR check 3/21/2018   Target end date Indefinite    Indications   Long-term (current) use of anticoagulants [Z79.01] [Z79.01]  Atrial fibrillation (H) [I48.91] (Resolved) [I48.91]         Anticoagulation Episode Summary     INR check location     Preferred lab     Send INR reminders to [facility]AG[facility]    Comments patient have standing INR order to be draw at infusion visit.  advise to call [facility] when have INR draw for dosing instruction.  patient can be reach at home phone [phone]      Anticoagulation Care Providers     Provider Role Specialty Phone number    Addy Frias MD [facility] Internal Medicine [phone]            See the Encounter Report to view Anticoagulation Flowsheet and Dosing Calendar (Go to Encounters tab in chart review, and find the Anticoagulation Therapy Visit)    Dosage adjustment made based on physician directed care plan. INR 2.41 per result note in Epic done at oncology.  Called home number and unable to leave message.  Will leave result note open until reaching patient. Continue 6 mg MF, 4 mg ROW and recheck INR one week.    Tigist Corral RN

## 2018-03-14 NOTE — MR AVS SNAPSHOT
Amira Arreola   3/14/2018   Anticoagulation Therapy Visit    Description:  85 year old female   Provider:  Addy Frias MD   Department:  [facility]           INR as of 3/14/2018     Today's INR 2.41      Anticoagulation Summary as of 3/14/2018     INR goal 2.0-3.0   Today's INR 2.41   Full instructions 6 mg on Mon, Fri; 4 mg all other days   Next INR check 3/21/2018    Indications   Long-term (current) use of anticoagulants [Z79.01] [Z79.01]  Atrial fibrillation (H) [I48.91] (Resolved) [I48.91]         March 2018 Details    Sun Mon Tue Wed Thu Fri Sat         1               2               3                 4               5               6               7               8               9               10                 11               12               13               14      4 mg   See details      15      4 mg         16      6 mg         17      4 mg           18      4 mg         19      6 mg         20      4 mg         21            22               23               24                 25               26               27               28               29               30               31                Date Details   03/14 This INR check       Date of next INR:  3/21/2018         How to take your warfarin dose     To take:  4 mg Take 1 of the 4 mg tablets.    To take:  6 mg Take 1.5 of the 4 mg tablets.

## 2018-03-15 LAB
ALBUMIN SERPL ELPH-MCNC: 3.6 G/DL (ref 3.7–5.1)
ALPHA1 GLOB SERPL ELPH-MCNC: 0.3 G/DL (ref 0.2–0.4)
ALPHA2 GLOB SERPL ELPH-MCNC: 0.7 G/DL (ref 0.5–0.9)
B-GLOBULIN SERPL ELPH-MCNC: 0.6 G/DL (ref 0.6–1)
GAMMA GLOB SERPL ELPH-MCNC: 0.3 G/DL (ref 0.7–1.6)
KAPPA LC UR-MCNC: 1.91 MG/DL (ref 0.33–1.94)
KAPPA LC/LAMBDA SER: ABNORMAL {RATIO} (ref 0.26–1.65)
LAMBDA LC SERPL-MCNC: <0.25 MG/DL (ref 0.57–2.63)
M PROTEIN SERPL ELPH-MCNC: 0.1 G/DL
PROT PATTERN SERPL ELPH-IMP: ABNORMAL

## 2018-03-21 ENCOUNTER — INFUSION THERAPY VISIT (OUTPATIENT)
Dept: INFUSION THERAPY | Facility: CLINIC | Age: 83
End: 2018-03-21
Attending: INTERNAL MEDICINE
Payer: MEDICARE

## 2018-03-21 ENCOUNTER — HOSPITAL ENCOUNTER (OUTPATIENT)
Facility: CLINIC | Age: 83
Setting detail: SPECIMEN
Discharge: HOME | End: 2018-03-21
Attending: INTERNAL MEDICINE | Admitting: INTERNAL MEDICINE
Payer: MEDICARE

## 2018-03-21 ENCOUNTER — ANTICOAGULATION THERAPY VISIT (OUTPATIENT)
Dept: FAMILY MEDICINE | Facility: CLINIC | Age: 83
End: 2018-03-21
Payer: COMMERCIAL

## 2018-03-21 VITALS
WEIGHT: 149.6 LBS | RESPIRATION RATE: 18 BRPM | BODY MASS INDEX: 24.04 KG/M2 | OXYGEN SATURATION: 98 % | DIASTOLIC BLOOD PRESSURE: 78 MMHG | HEIGHT: 66 IN | TEMPERATURE: 98.9 F | SYSTOLIC BLOOD PRESSURE: 145 MMHG | HEART RATE: 72 BPM

## 2018-03-21 DIAGNOSIS — C90.00 MULTIPLE MYELOMA NOT HAVING ACHIEVED REMISSION (H): Primary | ICD-10-CM

## 2018-03-21 DIAGNOSIS — I48.0 PAROXYSMAL ATRIAL FIBRILLATION (H): ICD-10-CM

## 2018-03-21 DIAGNOSIS — Z79.01 LONG-TERM (CURRENT) USE OF ANTICOAGULANTS: ICD-10-CM

## 2018-03-21 LAB
BASOPHILS # BLD AUTO: 0 10E9/L (ref 0–0.2)
BASOPHILS NFR BLD AUTO: 0 %
DIFFERENTIAL METHOD BLD: ABNORMAL
EOSINOPHIL # BLD AUTO: 0 10E9/L (ref 0–0.7)
EOSINOPHIL NFR BLD AUTO: 0.1 %
ERYTHROCYTE [DISTWIDTH] IN BLOOD BY AUTOMATED COUNT: 14.7 % (ref 10–15)
HCT VFR BLD AUTO: 36.9 % (ref 35–47)
HGB BLD-MCNC: 12.2 G/DL (ref 11.7–15.7)
IMM GRANULOCYTES # BLD: 0 10E9/L (ref 0–0.4)
IMM GRANULOCYTES NFR BLD: 0.3 %
INR PPP: 2.4 (ref 0.86–1.14)
LYMPHOCYTES # BLD AUTO: 0.4 10E9/L (ref 0.8–5.3)
LYMPHOCYTES NFR BLD AUTO: 5.1 %
MCH RBC QN AUTO: 32.7 PG (ref 26.5–33)
MCHC RBC AUTO-ENTMCNC: 33.1 G/DL (ref 31.5–36.5)
MCV RBC AUTO: 99 FL (ref 78–100)
MONOCYTES # BLD AUTO: 0.1 10E9/L (ref 0–1.3)
MONOCYTES NFR BLD AUTO: 2.1 %
NEUTROPHILS # BLD AUTO: 6.3 10E9/L (ref 1.6–8.3)
NEUTROPHILS NFR BLD AUTO: 92.4 %
NRBC # BLD AUTO: 0 10*3/UL
NRBC BLD AUTO-RTO: 0 /100
PLATELET # BLD AUTO: 128 10E9/L (ref 150–450)
RBC # BLD AUTO: 3.73 10E12/L (ref 3.8–5.2)
WBC # BLD AUTO: 6.8 10E9/L (ref 4–11)

## 2018-03-21 PROCEDURE — 85025 COMPLETE CBC W/AUTO DIFF WBC: CPT | Performed by: INTERNAL MEDICINE

## 2018-03-21 PROCEDURE — 25000128 H RX IP 250 OP 636: Mod: JW | Performed by: INTERNAL MEDICINE

## 2018-03-21 PROCEDURE — 85610 PROTHROMBIN TIME: CPT | Performed by: INTERNAL MEDICINE

## 2018-03-21 PROCEDURE — 96401 CHEMO ANTI-NEOPL SQ/IM: CPT

## 2018-03-21 PROCEDURE — 99207 ZZC NO CHARGE NURSE ONLY: CPT | Performed by: INTERNAL MEDICINE

## 2018-03-21 RX ORDER — ACYCLOVIR 400 MG/1
400 TABLET ORAL 2 TIMES DAILY
Qty: 60 TABLET | Refills: 11 | Status: SHIPPED | OUTPATIENT
Start: 2018-03-21 | End: 2019-03-01

## 2018-03-21 RX ORDER — HEPARIN SODIUM (PORCINE) LOCK FLUSH IV SOLN 100 UNIT/ML 100 UNIT/ML
5 SOLUTION INTRAVENOUS EVERY 8 HOURS
Status: DISCONTINUED | OUTPATIENT
Start: 2018-03-21 | End: 2018-03-21 | Stop reason: HOSPADM

## 2018-03-21 RX ADMIN — SODIUM CHLORIDE, PRESERVATIVE FREE 5 ML: 5 INJECTION INTRAVENOUS at 10:52

## 2018-03-21 RX ADMIN — BORTEZOMIB 2.3 MG: 3.5 INJECTION, POWDER, LYOPHILIZED, FOR SOLUTION INTRAVENOUS; SUBCUTANEOUS at 11:11

## 2018-03-21 ASSESSMENT — PAIN SCALES - GENERAL: PAINLEVEL: NO PAIN (0)

## 2018-03-21 NOTE — PROGRESS NOTES
Infusion Nursing Note:  Amira Arreola presents today for C11 D8 Velcade.    Patient seen by provider today: No   present during visit today: Not Applicable.    Note: Refer to doc flowsheet for assessment, patient reports no new concerns. Patient verbalizes understanding of correct dosing/schedule of oral dexamethasone and acyclovir as ordered. Refill for acyclovir sent to [facility] per patient request.      Intravenous Access:  Labs drawn without difficulty.  Implanted Port.    Treatment Conditions:  Lab Results   Component Value Date    HGB 12.2 03/21/2018     Lab Results   Component Value Date    WBC 6.8 03/21/2018      Lab Results   Component Value Date    ANEU 6.3 03/21/2018     Lab Results   Component Value Date     03/21/2018      Results reviewed, labs MET treatment parameters, ok to proceed with treatment.    INR 2.4  Patient aware    Post Infusion Assessment:  Patient tolerated injection without incident. Velcade given SQ in abdomen RUQ.  Site patent and intact, free from redness, edema or discomfort.  No evidence of extravasations.  Access discontinued per protocol.    Discharge Plan:   Prescription refills given for acyclovir.  Discharge instructions reviewed with: Patient.  Patient and/or family verbalized understanding of discharge instructions and all questions answered.  Copy of AVS reviewed with patient and/or family.  Patient will return 3/28 for next appointment.  Patient discharged in stable condition accompanied by: self.  Departure Mode: Ambulatory.    SHELLIE Mattson RN

## 2018-03-21 NOTE — MR AVS SNAPSHOT
After Visit Summary   3/21/2018    Amira Arreola    MRN: [id number]           Patient Information     Date Of Birth          7/17/1932        Visit Information        Provider Department      3/21/2018 10:00 AM  INFUSION CHAIR 20 [facility]        Today's Diagnoses     Multiple myeloma not having achieved remission (H)    -  1    Paroxysmal atrial fibrillation (H)           Follow-ups after your visit        Your next 10 appointments already scheduled     Mar 28, 2018  8:30 AM CDT   Level 2 with  INFUSION CHAIR 10   [facility] ([facility])    [facility] [place] [address]  [place] [address]   [phone]            Mar 28, 2018  9:00 AM CDT   Return Visit with Shayne Roberts MD   [facility] ([facility])    [facility]  [address]  [place] [address]   [phone]            Apr 30, 2018 10:15 AM CDT   Return Visit with Ramos Rivera MD   [facility] ([facility])    [address]  [facility] [address] [phone] OPT 2              Who to contact     If you have questions or need follow up information about today's clinic visit or your schedule please contact [facility] directly at [phone].  Normal or non-critical lab and imaging results will be communicated to you by MyChart, letter or phone within 4 business days after the clinic has received the results. If you do not hear from us within 7 days, please contact the clinic through MyChart or phone. If you have a critical or abnormal lab result, we will notify you by phone as soon as possible.  Submit refill requests through [employer] or call your pharmacy and they will forward the refill request to us. Please allow 3 business days for your refill to be completed.     "      Additional Information About Your Visit        MyChart Information     [employer] lets you send messages to your doctor, view your test results, renew your prescriptions, schedule appointments and more. To sign up, go to [email or web address] . Click on \"Log in\" on the left side of the screen, which will take you to the Welcome page. Then click on \"Sign up Now\" on the right side of the page.     You will be asked to enter the access code listed below, as well as some personal information. Please follow the directions to create your username and password.     Your access code is: [email or web address]  Expires: 4/3/2018  4:04 PM     Your access code will  in 90 days. If you need help or a new code, please call your [place] clinic or [phone].        Care EveryWhere ID     This is your Care EveryWhere ID. This could be used by other organizations to access your [place] medical records  [id number]        Your Vitals Were     Pulse Temperature Respirations Height Pulse Oximetry BMI (Body Mass Index)    72 98.9  F (37.2  C) (Oral) 18 1.664 m (5' 5.51\") 98% 24.51 kg/m2       Blood Pressure from Last 3 Encounters:   18 145/78   18 141/73   18 113/73    Weight from Last 3 Encounters:   18 67.9 kg (149 lb 9.6 oz)   18 67.2 kg (148 lb 3.2 oz)   18 64.6 kg (142 lb 6.4 oz)              We Performed the Following     CBC with platelets differential     INR          Today's Medication Changes          These changes are accurate as of 3/21/18 11:13 AM.  If you have any questions, ask your nurse or doctor.               These medicines have changed or have updated prescriptions.        Dose/Directions    * acyclovir 400 MG tablet   Commonly known as:  ZOVIRAX   This may have changed:  Another medication with the same name was added. Make sure you understand how and when to take each.   Used for:  Multiple myeloma not having achieved remission (H)        Dose:  400 mg   Take 1 tablet " (400 mg) by mouth 2 times daily Viral Prophylaxis.   Quantity:  60 tablet   Refills:  11       * acyclovir 400 MG tablet   Commonly known as:  ZOVIRAX   This may have changed:  You were already taking a medication with the same name, and this prescription was added. Make sure you understand how and when to take each.   Used for:  Multiple myeloma not having achieved remission (H)        Dose:  400 mg   Take 1 tablet (400 mg) by mouth 2 times daily Start 1 week prior to daratumumab and continue for 3 months after treatment is complete.   Quantity:  60 tablet   Refills:  11       * Notice:  This list has 2 medication(s) that are the same as other medications prescribed for you. Read the directions carefully, and ask your doctor or other care provider to review them with you.         Where to get your medicines      These medications were sent to [facility] [id number]  [place], [place] - [address] AT [facility] [address]  [address], [place] [address]     Phone:  [phone]     acyclovir 400 MG tablet                Primary Care Provider Office Phone # Fax #    Addy Frias [phone] [address] [address]        Equal Access to Services     SARA ZELAYA AH: Hadii liane murcia haddoriso Soyair, waaxda luqadaha, qaybta kaalmada adeegyada, hung sadler. So [facility] [phone].    ATENCIÓN: Si habla español, tiene a barlow disposición servicios gratuitos de asistencia lingüística. Llame al [phone].    We comply with applicable federal civil rights laws and [place] laws. We do not discriminate on the basis of race, color, national origin, age, disability, sex, sexual orientation, or gender identity.            Thank you!     Thank you for choosing [facility]  for your care. Our goal is always to provide you with excellent care. Hearing back from our patients is one way we can continue to improve  our services. Please take a few minutes to complete the written survey that you may receive in the mail after your visit with us. Thank you!             Your Updated Medication List - Protect others around you: Learn how to safely use, store and throw away your medicines at www.disposemymeds.org.          This list is accurate as of 3/21/18 11:13 AM.  Always use your most recent med list.                   Brand Name Dispense Instructions for use Diagnosis    * acyclovir 400 MG tablet    ZOVIRAX    60 tablet    Take 1 tablet (400 mg) by mouth 2 times daily Viral Prophylaxis.    Multiple myeloma not having achieved remission (H)       * acyclovir 400 MG tablet    ZOVIRAX    60 tablet    Take 1 tablet (400 mg) by mouth 2 times daily Start 1 week prior to daratumumab and continue for 3 months after treatment is complete.    Multiple myeloma not having achieved remission (H)       CALCIUM CITRATE + PO      Take 2,000 mg by mouth daily 2 tabs        carboxymethylcellulose 0.5 % Soln ophthalmic solution    REFRESH PLUS     1 drop 4 times daily        CLARINEX PO      Take by mouth daily Taking claritin        COMPAZINE PO      Take 10 mg by mouth daily as needed        cycloSPORINE 0.05 % ophthalmic emulsion    RESTASIS     Place 1 drop into both eyes every 12 hours        DAILY MULTIVITAMIN PO      Take 1 tablet by mouth daily.    Routine general medical examination at a health care facility       dexamethasone 4 MG tablet    DECADRON    28 tablet    Take 20mg (5 tablets) by mouth every week on the morning of velcade injection.    Multiple myeloma not having achieved remission (H)       erythromycin ophthalmic ointment    ROMYCIN     Place 1 Application into both eyes At Bedtime        lidocaine-prilocaine cream    EMLA    30 g    Apply topically as needed for moderate pain Apply dollop size amount to port site 30-60 min prior to accessing    Multiple myeloma not having achieved remission (H)       LORazepam 0.5 MG  tablet    ATIVAN    30 tablet    Take 1 tablet (0.5 mg) by mouth every 8 hours as needed for anxiety    Multiple myeloma not having achieved remission (H)       metoprolol succinate 50 MG 24 hr tablet    TOPROL-XL    270 tablet    TAKE 2 TABLETS BY MOUTH EVERY MORNING, AND 1 TABLET EVERY EVENING    Paroxysmal atrial fibrillation (H)       oxyCODONE IR 15 MG tablet    ROXICODONE    60 tablet    Take 1 tablet (15 mg) by mouth every 8 hours as needed for pain maximum 4 tablet(s) per day    Multiple myeloma not having achieved remission (H)       polyethylene glycol powder    MIRALAX/GLYCOLAX     Take 1 capful by mouth daily as needed    Bilateral leg edema       SIMBRINZA 1-0.2 % ophthalmic suspension   Generic drug:  brinzolamide-brimonidine      Place 1 drop into the right eye 2 times daily 1 drop AM and PM        triamcinolone 0.5 % cream    KENALOG    15 g    Apply sparingly to affected area three times daily. 1-2 weeks , as needed    Rash and nonspecific skin eruption       TYLENOL PO      Take 500 mg by mouth every 6 hours as needed for mild pain or fever        UNABLE TO FIND      MEDICATION NAME: Fresh Coat eye drops        VITAMIN D3 PO      Take 1,000 Units by mouth daily        warfarin 4 MG tablet    COUMADIN    110 tablet    Take 1-2 tablets daily as directed by INR clinic.    Paroxysmal atrial fibrillation (H), Long-term (current) use of anticoagulants       ZOMETA IV      Inject into the vein every 30 days Every 3 month dosing        * Notice:  This list has 2 medication(s) that are the same as other medications prescribed for you. Read the directions carefully, and ask your doctor or other care provider to review them with you.

## 2018-03-21 NOTE — PROGRESS NOTES
ANTICOAGULATION FOLLOW-UP CLINIC VISIT    Patient Name:  Amira Arreola  Date:  3/21/2018  Contact Type:  Telephone    SUBJECTIVE:     Patient Findings     Positives No Problem Findings           OBJECTIVE    INR   Date Value Ref Range Status   03/21/2018 2.40 (H) 0.86 - 1.14 Final       ASSESSMENT / PLAN  INR assessment THER    Recheck INR In: 1 WEEK    INR Location Outside lab      Anticoagulation Summary as of 3/21/2018     INR goal 2.0-3.0   Today's INR 2.40   Maintenance plan 6 mg (4 mg x 1.5) on Mon, Fri; 4 mg (4 mg x 1) all other days   Full instructions 6 mg on Mon, Fri; 4 mg all other days   Weekly total 32 mg   No change documented Michelle Pinon RN   Plan last modified Tigist Corral RN (1/10/2018)   Next INR check 3/28/2018   Target end date Indefinite    Indications   Long-term (current) use of anticoagulants [Z79.01] [Z79.01]  Atrial fibrillation (H) [I48.91] (Resolved) [I48.91]         Anticoagulation Episode Summary     INR check location     Preferred lab     Send INR reminders to [facility] ANTICOAGULATION    Comments patient have standing INR order to be draw at infusion visit.  advise to call [facility] when have INR draw for dosing instruction.  patient can be reach at home phone [phone]      Anticoagulation Care Providers     Provider Role Specialty Phone number    Addy Frias MD [facility] Internal Medicine [phone]            See the Encounter Report to view Anticoagulation Flowsheet and Dosing Calendar (Go to Encounters tab in chart review, and find the Anticoagulation Therapy Visit)    Dosage adjustment made based on physician directed care plan.  Left detailed VM for patient with instructions  Advised she call clinic with questions/concerns    Michelle Pinon, SHELLIE

## 2018-03-21 NOTE — MR AVS SNAPSHOT
Amiragino Arreola   3/21/2018   Anticoagulation Therapy Visit    Description:  85 year old female   Provider:  Addy Frias MD   Department:  [facility]           INR as of 3/21/2018     Today's INR 2.40      Anticoagulation Summary as of 3/21/2018     INR goal 2.0-3.0   Today's INR 2.40   Full instructions 6 mg on Mon, Fri; 4 mg all other days   Next INR check 3/28/2018    Indications   Long-term (current) use of anticoagulants [Z79.01] [Z79.01]  Atrial fibrillation (H) [I48.91] (Resolved) [I48.91]         March 2018 Details    Sun Mon Tue Wed Thu Fri Sat         1               2               3                 4               5               6               7               8               9               10                 11               12               13               14               15               16               17                 18               19               20               21      4 mg   See details      22      4 mg         23      6 mg         24      4 mg           25      4 mg         26      6 mg         27      4 mg         28            29               30               31                Date Details   03/21 This INR check       Date of next INR:  3/28/2018         How to take your warfarin dose     To take:  4 mg Take 1 of the 4 mg tablets.    To take:  6 mg Take 1.5 of the 4 mg tablets.

## 2018-03-28 ENCOUNTER — ANTICOAGULATION THERAPY VISIT (OUTPATIENT)
Dept: FAMILY MEDICINE | Facility: CLINIC | Age: 83
End: 2018-03-28

## 2018-03-28 ENCOUNTER — ONCOLOGY VISIT (OUTPATIENT)
Dept: ONCOLOGY | Facility: CLINIC | Age: 83
End: 2018-03-28
Attending: INTERNAL MEDICINE
Payer: MEDICARE

## 2018-03-28 ENCOUNTER — INFUSION THERAPY VISIT (OUTPATIENT)
Dept: INFUSION THERAPY | Facility: CLINIC | Age: 83
End: 2018-03-28
Attending: INTERNAL MEDICINE
Payer: MEDICARE

## 2018-03-28 ENCOUNTER — HOSPITAL ENCOUNTER (OUTPATIENT)
Facility: CLINIC | Age: 83
Setting detail: SPECIMEN
Discharge: HOME | End: 2018-03-28
Attending: INTERNAL MEDICINE | Admitting: INTERNAL MEDICINE
Payer: MEDICARE

## 2018-03-28 VITALS
RESPIRATION RATE: 14 BRPM | TEMPERATURE: 97.9 F | HEIGHT: 66 IN | BODY MASS INDEX: 23.78 KG/M2 | SYSTOLIC BLOOD PRESSURE: 126 MMHG | HEART RATE: 94 BPM | WEIGHT: 148 LBS | DIASTOLIC BLOOD PRESSURE: 83 MMHG

## 2018-03-28 VITALS
SYSTOLIC BLOOD PRESSURE: 126 MMHG | DIASTOLIC BLOOD PRESSURE: 83 MMHG | WEIGHT: 148 LBS | BODY MASS INDEX: 23.78 KG/M2 | HEIGHT: 66 IN | RESPIRATION RATE: 14 BRPM | TEMPERATURE: 97.9 F | HEART RATE: 94 BPM

## 2018-03-28 DIAGNOSIS — C90.00 MULTIPLE MYELOMA NOT HAVING ACHIEVED REMISSION (H): Primary | ICD-10-CM

## 2018-03-28 DIAGNOSIS — I48.0 PAROXYSMAL ATRIAL FIBRILLATION (H): ICD-10-CM

## 2018-03-28 DIAGNOSIS — Z95.828 PORTACATH IN PLACE: ICD-10-CM

## 2018-03-28 LAB
BASOPHILS # BLD AUTO: 0 10E9/L (ref 0–0.2)
BASOPHILS NFR BLD AUTO: 0 %
DIFFERENTIAL METHOD BLD: ABNORMAL
EOSINOPHIL # BLD AUTO: 0 10E9/L (ref 0–0.7)
EOSINOPHIL NFR BLD AUTO: 0.2 %
ERYTHROCYTE [DISTWIDTH] IN BLOOD BY AUTOMATED COUNT: 14.9 % (ref 10–15)
HCT VFR BLD AUTO: 37.1 % (ref 35–47)
HGB BLD-MCNC: 12.2 G/DL (ref 11.7–15.7)
IMM GRANULOCYTES # BLD: 0 10E9/L (ref 0–0.4)
IMM GRANULOCYTES NFR BLD: 0.3 %
INR PPP: 2.23 (ref 0.86–1.14)
LYMPHOCYTES # BLD AUTO: 0.4 10E9/L (ref 0.8–5.3)
LYMPHOCYTES NFR BLD AUTO: 6.1 %
MCH RBC QN AUTO: 32.5 PG (ref 26.5–33)
MCHC RBC AUTO-ENTMCNC: 32.9 G/DL (ref 31.5–36.5)
MCV RBC AUTO: 99 FL (ref 78–100)
MONOCYTES # BLD AUTO: 0.1 10E9/L (ref 0–1.3)
MONOCYTES NFR BLD AUTO: 1.6 %
NEUTROPHILS # BLD AUTO: 5.7 10E9/L (ref 1.6–8.3)
NEUTROPHILS NFR BLD AUTO: 91.8 %
NRBC # BLD AUTO: 0 10*3/UL
NRBC BLD AUTO-RTO: 0 /100
PLATELET # BLD AUTO: 124 10E9/L (ref 150–450)
RBC # BLD AUTO: 3.75 10E12/L (ref 3.8–5.2)
WBC # BLD AUTO: 6.2 10E9/L (ref 4–11)

## 2018-03-28 PROCEDURE — 96401 CHEMO ANTI-NEOPL SQ/IM: CPT

## 2018-03-28 PROCEDURE — 25000128 H RX IP 250 OP 636: Performed by: INTERNAL MEDICINE

## 2018-03-28 PROCEDURE — G0463 HOSPITAL OUTPT CLINIC VISIT: HCPCS | Mod: 25

## 2018-03-28 PROCEDURE — 99214 OFFICE O/P EST MOD 30 MIN: CPT | Performed by: INTERNAL MEDICINE

## 2018-03-28 PROCEDURE — 85610 PROTHROMBIN TIME: CPT | Performed by: INTERNAL MEDICINE

## 2018-03-28 PROCEDURE — 85025 COMPLETE CBC W/AUTO DIFF WBC: CPT | Performed by: INTERNAL MEDICINE

## 2018-03-28 RX ORDER — HEPARIN SODIUM (PORCINE) LOCK FLUSH IV SOLN 100 UNIT/ML 100 UNIT/ML
500 SOLUTION INTRAVENOUS EVERY 8 HOURS
Status: CANCELLED
Start: 2018-03-28

## 2018-03-28 RX ORDER — HEPARIN SODIUM (PORCINE) LOCK FLUSH IV SOLN 100 UNIT/ML 100 UNIT/ML
500 SOLUTION INTRAVENOUS EVERY 8 HOURS
Status: DISCONTINUED | OUTPATIENT
Start: 2018-03-28 | End: 2018-03-28 | Stop reason: HOSPADM

## 2018-03-28 RX ORDER — LORAZEPAM 0.5 MG/1
0.5 TABLET ORAL EVERY 8 HOURS PRN
Qty: 30 TABLET | Refills: 3 | Status: SHIPPED | OUTPATIENT
Start: 2018-03-28 | End: 2018-07-18

## 2018-03-28 RX ORDER — OXYCODONE HYDROCHLORIDE 15 MG/1
15 TABLET ORAL EVERY 8 HOURS PRN
Qty: 60 TABLET | Refills: 0 | Status: SHIPPED | OUTPATIENT
Start: 2018-03-28 | End: 2018-04-25

## 2018-03-28 RX ADMIN — SODIUM CHLORIDE, PRESERVATIVE FREE 500 UNITS: 5 INJECTION INTRAVENOUS at 08:39

## 2018-03-28 RX ADMIN — BORTEZOMIB 2.3 MG: 3.5 INJECTION, POWDER, LYOPHILIZED, FOR SOLUTION INTRAVENOUS; SUBCUTANEOUS at 09:57

## 2018-03-28 ASSESSMENT — PAIN SCALES - GENERAL: PAINLEVEL: MODERATE PAIN (4)

## 2018-03-28 NOTE — MR AVS SNAPSHOT
Amira Arreola   3/28/2018   Anticoagulation Therapy Visit    Description:  85 year old female   Provider:  Addy Frias MD   Department:  [facility]           INR as of 3/28/2018     Today's INR 2.23      Anticoagulation Summary as of 3/28/2018     INR goal 2.0-3.0   Today's INR 2.23   Full instructions 6 mg on Mon, Fri; 4 mg all other days   Next INR check 4/11/2018    Indications   Long-term (current) use of anticoagulants [Z79.01] [Z79.01]  Atrial fibrillation (H) [I48.91] (Resolved) [I48.91]         March 2018 Details    Sun Mon Tue Wed Thu Fri Sat         1               2               3                 4               5               6               7               8               9               10                 11               12               13               14               15               16               17                 18               19               20               21               22               23               24                 25               26               27               28      4 mg   See details      29      4 mg         30      6 mg         31      4 mg          Date Details   03/28 This INR check               How to take your warfarin dose     To take:  4 mg Take 1 of the 4 mg tablets.    To take:  6 mg Take 1.5 of the 4 mg tablets.           April 2018 Details    Sun Mon Tue Wed Thu Fri Sat     1      4 mg         2      6 mg         3      4 mg         4      4 mg         5      4 mg         6      6 mg         7      4 mg           8      4 mg         9      6 mg         10      4 mg         11            12               13               14                 15               16               17               18               19               20               21                 22               23               24               25               26               27               28                 29               30                     Date Details    No additional details    Date of next INR:  4/11/2018         How to take your warfarin dose     To take:  4 mg Take 1 of the 4 mg tablets.    To take:  6 mg Take 1.5 of the 4 mg tablets.

## 2018-03-28 NOTE — PROGRESS NOTES
Can you please dose this patients coumadin and let the patient know?    Thanks      Addy Frias M.D.    Note forwarded to Dr Armando Stacy RN

## 2018-03-28 NOTE — PROGRESS NOTES
ANTICOAGULATION FOLLOW-UP CLINIC VISIT    Patient Name:  Amira Arreola  Date:  3/28/2018  Contact Type:  Telephone    SUBJECTIVE:     Patient Findings     Positives No Problem Findings           OBJECTIVE    INR   Date Value Ref Range Status   03/28/2018 2.23 (H) 0.86 - 1.14 Final       ASSESSMENT / PLAN  INR assessment THER    Recheck INR In: 2 WEEKS    INR Location Outside lab      Anticoagulation Summary as of 3/28/2018     INR goal 2.0-3.0   Today's INR 2.23   Maintenance plan 6 mg (4 mg x 1.5) on Mon, Fri; 4 mg (4 mg x 1) all other days   Full instructions 6 mg on Mon, Fri; 4 mg all other days   Weekly total 32 mg   No change documented Tigist Corral RN   Plan last modified Tigist Corral RN (1/10/2018)   Next INR check 4/11/2018   Target end date Indefinite    Indications   Long-term (current) use of anticoagulants [Z79.01] [Z79.01]  Atrial fibrillation (H) [I48.91] (Resolved) [I48.91]         Anticoagulation Episode Summary     INR check location     Preferred lab     Send INR reminders to [facility] ANTICOAGULATION    Comments patient have standing INR order to be draw at infusion visit.  advise to call [facility] when have INR draw for dosing instruction.  patient can be reach at home phone [phone]      Anticoagulation Care Providers     Provider Role Specialty Phone number    Addy Frias MD [facility] Internal Medicine [phone]            See the Encounter Report to view Anticoagulation Flowsheet and Dosing Calendar (Go to Encounters tab in chart review, and find the Anticoagulation Therapy Visit)    Dosage adjustment made based on physician directed care plan. INR result note in Epic:  2.23 done at infusion appointment.  No change. Patient's phone rings and rings without VM option.  Will keep trying. She is very aware of her INR dosing regimen and based on previous discussions with patient, she knows to continue her same schedule when INR's are in range.  Tigist Corral RN

## 2018-03-28 NOTE — PATIENT INSTRUCTIONS
Continue chemotherapy.  Scheduled - Sarah  Follow up in 1 month.   Scheduled - Sarah    AVS given to patient - Sarah

## 2018-03-28 NOTE — PROGRESS NOTES
"Oncology Rooming Note    March 28, 2018 9:03 AM   Amira Arreola is a 85 year old female who presents for:    Chief Complaint   Patient presents with     Oncology Clinic Visit     Initial Vitals: /83  Pulse 94  Temp 97.9  F (36.6  C) (Oral)  Resp 14  Ht 1.664 m (5' 5.51\")  Wt 67.1 kg (148 lb)  BMI 24.24 kg/m2 Estimated body mass index is 24.24 kg/(m^2) as calculated from the following:    Height as of this encounter: 1.664 m (5' 5.51\").    Weight as of this encounter: 67.1 kg (148 lb). Body surface area is 1.76 meters squared.  Data Unavailable Comment: Data Unavailable   No LMP recorded. Patient is postmenopausal.  Allergies reviewed: Yes  Medications reviewed: Yes    Medications: MEDICATION REFILLS NEEDED TODAY. Provider was notified. ATIVAN AND OXYCODONE REFILLS NEEDED TODAY.  Pharmacy name entered into CareSpotter: [facility] [facility] [address] [place] [address] 7 [place] [facility] [place] [address]    Clinical concerns: Pt has questions regarding her pain and bones.    2 minutes for nursing intake (face to face time)     Shantel Avila CMA              "

## 2018-03-28 NOTE — LETTER
"    3/28/2018         RE: Amira Arreola  [address]  [place] [address]        Dear Colleague,    Thank you for referring your patient, Amira Arreola, to the [facility]. Please see a copy of my visit note below.    Oncology Rooming Note    March 28, 2018 9:03 AM   Amira Arreola is a 85 year old female who presents for:    Chief Complaint   Patient presents with     Oncology Clinic Visit     Initial Vitals: /83  Pulse 94  Temp 97.9  F (36.6  C) (Oral)  Resp 14  Ht 1.664 m (5' 5.51\")  Wt 67.1 kg (148 lb)  BMI 24.24 kg/m2 Estimated body mass index is 24.24 kg/(m^2) as calculated from the following:    Height as of this encounter: 1.664 m (5' 5.51\").    Weight as of this encounter: 67.1 kg (148 lb). Body surface area is 1.76 meters squared.  Data Unavailable Comment: Data Unavailable   No LMP recorded. Patient is postmenopausal.  Allergies reviewed: Yes  Medications reviewed: Yes    Medications: MEDICATION REFILLS NEEDED TODAY. Provider was notified. ATIVAN AND OXYCODONE REFILLS NEEDED TODAY.  Pharmacy name entered into Relayr: [facility] - [place], [place] - [address] [place] [facility] [place] [address]    Clinical concerns: Pt has questions regarding her pain and bones.    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     1.  She had work-up for thrombocytopenia.       -On 09/21/2015, WBC of 4.2, hemoglobin of 13.2 and platelets of 138.    -On 09/29/2015, SPEP does not reveal any M-spike.   -On 10/02/2015, JANET does not reveal any monoclonal protein.     -On 10/22/2015, urine immunofixation reveals monoclonal free kappa light chain.    2. On 05/11/2016, kappa light chain of 50, lambda light chain of 0.32 and ratio of kappa to lambda of 156.2.  3. Skeletal survey on 05/23/2016 does not reveal any lytic lesion.  "   4. Bone marrow biopsy on 05/25/2016 reveals 40-50% kappa light chain restricted plasma cells.  Cytogenetics is normal. 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    5. MRI of bones on 06/21/2016 and 06/22/2016 reveals myeloma lesions.  6. On 08/24/2016, she was started on revlimid 25 mg 3 weeks on and 1 week off along with dexamethasone 20 mg weekly. Due to cytopenia, dose was subsequently reduced to 15 mg a day. Treatment in between had to be delayed because of cytopenia. She did not have any significant response to treatment.   7. Velcade and dexamethasone started on 03/21/2017.    8. On 03/21/2017, kappa free light chain was 52.5.  It decreased to 41.75 on 04/18/2017.  It  increased to 60.75 on 05/16/2017.    9. Daratumumab added on 05/31/2017.   10.  On 03/14/2018, kappa free light chain is down to 1.91.    SUBJECTIVE:    Ms. Arreola is an 85-year-old female with kappa free light chain multiple myeloma on daratumumab, Velcade and dexamethasone.  [place] free light chain has been decreasing. Overall, she is tolerating treatment well.        REVIEW OF SYSTEMS:   Patient has chronic back pain. Recently pain has been more. She has to take more oxycodone. It helps. No new area of pain. She has neuropathy It is stable. She has fatigue. It is age appropriate. No worsening of fatigue.    Denies any headache or dizziness. No chest pain or difficulty breathing.  No abdominal pain, nausea or vomiting.  No urinary complaint. She has constipation.  No bleeding.  No fever or chills.       PHYSICAL EXAMINATION:   Alert and oriented x 3.   EYES:  No icterus.   THROAT:  No ulcer or thrush.   NECK:  Supple. No lymphadenopathy.   AXILLAE:  No lymphadenopathy.   LUNGS:  Good air entry bilaterally.  No crackles or wheezing.   HEART:  Regular.  No murmur.   ABDOMEN:  Soft and nontender.  No mass.   EXTREMITIES: Bilateral pedal  edema. No calf swelling or tenderness.   SKIN:  There are a few ecchymoses.  No petechia. Patient is on warfarin.      LABORATORY DATA:  Reviewed.       ASSESSMENT:   1.  An 85-year-old female with kappa free light chain multiple myeloma. Myeloma is responding to treatment.  2.  Fatigue secondary to her age, myeloma and chemotherapy.   3.  Chronic back pain from myeloma and arthritis.       PLAN:   1.  Patient overall is doing well from myeloma.   Myeloma is responding to Velcade, daratumumab and dexamethasone. She will continue on Velcade, daratumumab and dexamethasone.  Side effects reviewed.     2. Patient has chronic back pain. Prescription of oxycodone IR 15 mg refilled.        3.   Her fatigue is multifactorial from her age, myeloma and chemotherapy.   She is pretty functional.  She is able to take care of herself and drive.         4.  For  myeloma bone lesion, she will continue on Zometa every 3 months.       5.  She  and her daughter had few questions, which were all answered.   I will see her back in 1 month.  I advised her to see a physician if she has worsening back pain, fever, chills, recurrent infection, bleeding or any other concerns.     Again, thank you for allowing me to participate in the care of your patient.        Sincerely,        Shayne Roberts MD

## 2018-03-28 NOTE — MR AVS SNAPSHOT
After Visit Summary   3/28/2018    Amira Arreola    MRN: [id number]           Patient Information     Date Of Birth          7/17/1932        Visit Information        Provider Department      3/28/2018 8:30 AM SH INFUSION CHAIR 10 [facility]        Today's Diagnoses     Multiple myeloma not having achieved remission (H)    -  1    Paroxysmal atrial fibrillation (H)        Portacath in place           Follow-ups after your visit        Your next 10 appointments already scheduled     Apr 04, 2018  9:30 AM CDT   Level 2 with SH INFUSION CHAIR 18   [facility] ([facility])    [facility] [place]  [address]  [place] [address]   [phone]            Apr 11, 2018 10:00 AM CDT   Level 5 with SH INFUSION CHAIR 4   [facility] ([facility])    [facility] [place]  [address]  [place] [address]   [phone]            Apr 18, 2018  9:30 AM CDT   Level 2 with SH INFUSION CHAIR 8   [facility] ([facility])    [facility] [place]  [address]  [place] [address] [phone]            Apr 25, 2018  9:30 AM CDT   Level 2 with SH INFUSION CHAIR 16   [facility] ([facility])    [facility] [place]  [address]  [place] [address]   [phone]            Apr 25, 2018 10:00 AM CDT   Return Visit with Shayne Roberts MD   [facility] ([facility])    [facility] [place]  [address]  [place] [address]   [phone]            Apr 30, 2018 10:15 AM CDT   Return Visit with Ramos Rivera MD   [facility] ([facility])    [address]  [place] [address]  "  [phone] OPT 2              Who to contact     If you have questions or need follow up information about today's clinic visit or your schedule please contact [facility] directly at [phone].  Normal or non-critical lab and imaging results will be communicated to you by [facility] [employer], letter or phone within 4 business days after the clinic has received the results. If you do not hear from us within 7 days, please contact the clinic through MyChart or phone. If you have a critical or abnormal lab result, we will notify you by phone as soon as possible.  Submit refill requests through [facility] or call your pharmacy and they will forward the refill request to us. Please allow 3 business days for your refill to be completed.          Additional Information About Your Visit        [facility] Information     [facility] lets you send messages to your doctor, view your test results, renew your prescriptions, schedule appointments and more. To sign up, go to [email or web address] . Click on \"Log in\" on the left side of the screen, which will take you to the Welcome page. Then click on \"Sign up Now\" on the right side of the page.     You will be asked to enter the access code listed below, as well as some personal information. Please follow the directions to create your username and password.     Your access code is: [email or web address]  Expires: 4/3/2018  4:04 PM     Your access code will  in 90 days. If you need help or a new code, please call your [place] clinic or [phone].        Care EveryWhere ID     This is your Care EveryWhere ID. This could be used by other organizations to access your [place] medical records  [id number]        Your Vitals Were     Pulse Temperature Respirations Height BMI (Body Mass Index)       94 97.9  F (36.6  C) (Oral) 14 1.664 m (5' 5.51\") 24.25 kg/m2        Blood Pressure from Last 3 Encounters:   18 126/83   18 126/83   18 145/78    " Weight from Last 3 Encounters:   03/28/18 67.1 kg (148 lb)   03/28/18 67.1 kg (148 lb)   03/21/18 67.9 kg (149 lb 9.6 oz)              We Performed the Following     CBC with platelets differential     INR          Today's Medication Changes          These changes are accurate as of 3/28/18 10:02 AM.  If you have any questions, ask your nurse or doctor.               These medicines have changed or have updated prescriptions.        Dose/Directions    acyclovir 400 MG tablet   Commonly known as:  ZOVIRAX   This may have changed:  Another medication with the same name was removed. Continue taking this medication, and follow the directions you see here.   Used for:  Multiple myeloma not having achieved remission (H)   Changed by:  Shayne Roberts MD        Dose:  400 mg   Take 1 tablet (400 mg) by mouth 2 times daily Start 1 week prior to daratumumab and continue for 3 months after treatment is complete.   Quantity:  60 tablet   Refills:  11            Where to get your medicines      Some of these will need a paper prescription and others can be bought over the counter.  Ask your nurse if you have questions.     Bring a paper prescription for each of these medications     LORazepam 0.5 MG tablet    oxyCODONE IR 15 MG tablet                Primary Care Provider Office Phone # Fax #    Addy Sean Frias [phone] [address] [address]        Equal Access to Services     [facility]: Hadii liane murcia haddoriso Soyair, waaxda luqadaha, qaybta kaalmada adeashu, hung sadler. So [facility] [phone].    ATENCIÓN: Si habla español, tiene a barlow disposición servicios gratuitos de asistencia lingüística. Llame al [phone].    We comply with applicable federal civil rights laws and [place] laws. We do not discriminate on the basis of race, color, national origin, age, disability, sex, sexual orientation, or gender identity.            Thank you!      Thank you for choosing [facility]  for your care. Our goal is always to provide you with excellent care. Hearing back from our patients is one way we can continue to improve our services. Please take a few minutes to complete the written survey that you may receive in the mail after your visit with us. Thank you!             Your Updated Medication List - Protect others around you: Learn how to safely use, store and throw away your medicines at www.disposemymeds.org.          This list is accurate as of 3/28/18 10:02 AM.  Always use your most recent med list.                   Brand Name Dispense Instructions for use Diagnosis    acyclovir 400 MG tablet    ZOVIRAX    60 tablet    Take 1 tablet (400 mg) by mouth 2 times daily Start 1 week prior to daratumumab and continue for 3 months after treatment is complete.    Multiple myeloma not having achieved remission (H)       CALCIUM CITRATE + PO      Take 2,000 mg by mouth daily 2 tabs        carboxymethylcellulose 0.5 % Soln ophthalmic solution    REFRESH PLUS     1 drop 4 times daily        CLARINEX PO      Take by mouth daily Taking claritin        COMPAZINE PO      Take 10 mg by mouth daily as needed        cycloSPORINE 0.05 % ophthalmic emulsion    RESTASIS     Place 1 drop into both eyes every 12 hours        DAILY MULTIVITAMIN PO      Take 1 tablet by mouth daily.    Routine general medical examination at a health care facility       dexamethasone 4 MG tablet    DECADRON    28 tablet    Take 20mg (5 tablets) by mouth every week on the morning of velcade injection.    Multiple myeloma not having achieved remission (H)       erythromycin ophthalmic ointment    ROMYCIN     Place 1 Application into both eyes At Bedtime        lidocaine-prilocaine cream    EMLA    30 g    Apply topically as needed for moderate pain Apply dollop size amount to port site 30-60 min prior to accessing    Multiple myeloma not having achieved remission (H)        LORazepam 0.5 MG tablet    ATIVAN    30 tablet    Take 1 tablet (0.5 mg) by mouth every 8 hours as needed for anxiety    Multiple myeloma not having achieved remission (H)       metoprolol succinate 50 MG 24 hr tablet    TOPROL-XL    270 tablet    TAKE 2 TABLETS BY MOUTH EVERY MORNING, AND 1 TABLET EVERY EVENING    Paroxysmal atrial fibrillation (H)       oxyCODONE IR 15 MG tablet    ROXICODONE    60 tablet    Take 1 tablet (15 mg) by mouth every 8 hours as needed for pain maximum 4 tablet(s) per day    Multiple myeloma not having achieved remission (H)       polyethylene glycol powder    MIRALAX/GLYCOLAX     Take 1 capful by mouth daily as needed    Bilateral leg edema       SIMBRINZA 1-0.2 % ophthalmic suspension   Generic drug:  brinzolamide-brimonidine      Place 1 drop into the right eye 2 times daily 1 drop AM and PM        triamcinolone 0.5 % cream    KENALOG    15 g    Apply sparingly to affected area three times daily. 1-2 weeks , as needed    Rash and nonspecific skin eruption       TYLENOL PO      Take 500 mg by mouth every 6 hours as needed for mild pain or fever        UNABLE TO FIND      MEDICATION NAME: Fresh Coat eye drops        VITAMIN D3 PO      Take 1,000 Units by mouth daily        warfarin 4 MG tablet    COUMADIN    110 tablet    Take 1-2 tablets daily as directed by INR clinic.    Paroxysmal atrial fibrillation (H), Long-term (current) use of anticoagulants       ZOMETA IV      Inject into the vein every 30 days Every 3 month dosing

## 2018-03-28 NOTE — PROGRESS NOTES
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     1.  She had work-up for thrombocytopenia.       -On 09/21/2015, WBC of 4.2, hemoglobin of 13.2 and platelets of 138.    -On 09/29/2015, SPEP does not reveal any M-spike.   -On 10/02/2015, JANET does not reveal any monoclonal protein.     -On 10/22/2015, urine immunofixation reveals monoclonal free kappa light chain.    2. On 05/11/2016, kappa light chain of 50, lambda light chain of 0.32 and ratio of kappa to lambda of 156.2.  3. Skeletal survey on 05/23/2016 does not reveal any lytic lesion.    4. Bone marrow biopsy on 05/25/2016 reveals 40-50% kappa light chain restricted plasma cells.  Cytogenetics is normal. 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    5. MRI of bones on 06/21/2016 and 06/22/2016 reveals myeloma lesions.  6. On 08/24/2016, she was started on revlimid 25 mg 3 weeks on and 1 week off along with dexamethasone 20 mg weekly. Due to cytopenia, dose was subsequently reduced to 15 mg a day. Treatment in between had to be delayed because of cytopenia. She did not have any significant response to treatment.   7. Velcade and dexamethasone started on 03/21/2017.    8. On 03/21/2017, kappa free light chain was 52.5.  It decreased to 41.75 on 04/18/2017.  It  increased to 60.75 on 05/16/2017.    9. Daratumumab added on 05/31/2017.   10.  On 03/14/2018, kappa free light chain is down to 1.91.    SUBJECTIVE:    Ms. Arreola is an 85-year-old female with kappa free light chain multiple myeloma on daratumumab, Velcade and dexamethasone.  [place] free light chain has been decreasing. Overall, she is tolerating treatment well.        REVIEW OF SYSTEMS:   Patient has chronic back pain. Recently pain has been more. She has to take more oxycodone. It helps. No new area of pain. She has neuropathy It is stable. She  has fatigue. It is age appropriate. No worsening of fatigue.    Denies any headache or dizziness. No chest pain or difficulty breathing.  No abdominal pain, nausea or vomiting.  No urinary complaint. She has constipation.  No bleeding.  No fever or chills.       PHYSICAL EXAMINATION:   Alert and oriented x 3.   EYES:  No icterus.   THROAT:  No ulcer or thrush.   NECK:  Supple. No lymphadenopathy.   AXILLAE:  No lymphadenopathy.   LUNGS:  Good air entry bilaterally.  No crackles or wheezing.   HEART:  Regular.  No murmur.   ABDOMEN:  Soft and nontender.  No mass.   EXTREMITIES: Bilateral pedal edema. No calf swelling or tenderness.   SKIN:  There are a few ecchymoses.  No petechia. Patient is on warfarin.      LABORATORY DATA:  Reviewed.       ASSESSMENT:   1.  An 85-year-old female with kappa free light chain multiple myeloma. Myeloma is responding to treatment.  2.  Fatigue secondary to her age, myeloma and chemotherapy.   3.  Chronic back pain from myeloma and arthritis.       PLAN:   1.  Patient overall is doing well from myeloma.  Myeloma is responding to Velcade, daratumumab and dexamethasone. She will continue on Velcade, daratumumab and dexamethasone.  Side effects reviewed.     2. Patient has chronic back pain. Prescription of oxycodone IR 15 mg refilled.        3.  Her fatigue is multifactorial from her age, myeloma and chemotherapy.  She is pretty functional.  She is able to take care of herself and drive.         4.  For myeloma bone lesion, she will continue on Zometa every 3 months.       5.  She and her daughter had few questions, which were all answered.  I will see her back in 1 month.  I advised her to see a physician if she has worsening back pain, fever, chills, recurrent infection, bleeding or any other concerns.

## 2018-03-28 NOTE — MR AVS SNAPSHOT
After Visit Summary   3/28/2018    Amira Arreola    MRN: [id number]           Patient Information     Date Of Birth          7/17/1932        Visit Information        Provider Department      3/28/2018 9:00 AM Shayne Roberts MD [facility]        Today's Diagnoses     Multiple myeloma not having achieved remission (H)    -  1      Care Instructions    Continue chemotherapy.  Follow up in 1 month.          Follow-ups after your visit        Your next 10 appointments already scheduled     Apr 11, 2018 10:00 AM CDT   Level 5 with  INFUSION CHAIR 4   [facility] ([facility])    [facility]  [address]  [facility] [address]   [phone]            Apr 30, 2018 10:15 AM CDT   Return Visit with Ramos Rivera MD   [facility] ([facility])    [address]  [place] [address] [phone] OPT 2              Who to contact     If you have questions or need follow up information about today's clinic visit or your schedule please contact [facility] directly at [phone].  Normal or non-critical lab and imaging results will be communicated to you by MyChart, letter or phone within 4 business days after the clinic has received the results. If you do not hear from us within 7 days, please contact the clinic through [email or web address] or phone. If you have a critical or abnormal lab result, we will notify you by phone as soon as possible.  Submit refill requests through Oxyntix or call your pharmacy and they will forward the refill request to us. Please allow 3 business days for your refill to be completed.          Additional Information About Your Visit        MyChart Information     Oxyntix lets you send messages to your doctor, view your test results, renew your prescriptions, schedule appointments and more. To sign up, go to  "[email or web address] . Click on \"Log in\" on the left side of the screen, which will take you to the Welcome page. Then click on \"Sign up Now\" on the right side of the page.     You will be asked to enter the access code listed below, as well as some personal information. Please follow the directions to create your username and password.     Your access code is: [email or web address]  Expires: 4/3/2018  4:04 PM     Your access code will  in 90 days. If you need help or a new code, please call your [facility] clinic or [phone].        Care EveryWhere ID     This is your Care EveryWhere ID. This could be used by other organizations to access your [facility] medical records  [id number]        Your Vitals Were     Pulse Temperature Respirations Height BMI (Body Mass Index)       94 97.9  F (36.6  C) (Oral) 14 1.664 m (5' 5.51\") 24.24 kg/m2        Blood Pressure from Last 3 Encounters:   18 126/83   18 126/83   18 145/78    Weight from Last 3 Encounters:   18 67.1 kg (148 lb)   18 67.1 kg (148 lb)   18 67.9 kg (149 lb 9.6 oz)              Today, you had the following     No orders found for display         Today's Medication Changes          These changes are accurate as of 3/28/18  9:57 AM.  If you have any questions, ask your nurse or doctor.               These medicines have changed or have updated prescriptions.        Dose/Directions    acyclovir 400 MG tablet   Commonly known as:  ZOVIRAX   This may have changed:  Another medication with the same name was removed. Continue taking this medication, and follow the directions you see here.   Used for:  Multiple myeloma not having achieved remission (H)   Changed by:  Shayne Roberts MD        Dose:  400 mg   Take 1 tablet (400 mg) by mouth 2 times daily Start 1 week prior to daratumumab and continue for 3 months after treatment is complete.   Quantity:  60 tablet   Refills:  11            Where to get your medicines      Some of " these will need a paper prescription and others can be bought over the counter.  Ask your nurse if you have questions.     Bring a paper prescription for each of these medications     LORazepam 0.5 MG tablet    oxyCODONE IR 15 MG tablet                Primary Care Provider Office Phone # Fax #    Addy Frias [phone] [address] [facility] [address]  [facility] [address]        Equal Access to Services     [facility] : Hadii aad ku hadasho Soomaali, waaxda luqadaha, qaybta kaalmada adeegyada, waxay idiin hayaan adeeg johnathon laMirnacesar . So [facility] [phone].    ATENCIÓN: Si berela vivian, tiene a barlow disposición servicios gratuitos de asistencia lingüística. Barame al [phone].    We comply with applicable federal civil rights laws and [place] laws. We do not discriminate on the basis of race, color, national origin, age, disability, sex, sexual orientation, or gender identity.            Thank you!     Thank you for choosing [facility]  for your care. Our goal is always to provide you with excellent care. Hearing back from our patients is one way we can continue to improve our services. Please take a few minutes to complete the written survey that you may receive in the mail after your visit with us. Thank you!             Your Updated Medication List - Protect others around you: Learn how to safely use, store and throw away your medicines at www.disposemymeds.org.          This list is accurate as of 3/28/18  9:57 AM.  Always use your most recent med list.                   Brand Name Dispense Instructions for use Diagnosis    acyclovir 400 MG tablet    ZOVIRAX    60 tablet    Take 1 tablet (400 mg) by mouth 2 times daily Start 1 week prior to daratumumab and continue for 3 months after treatment is complete.    Multiple myeloma not having achieved remission (H)       CALCIUM CITRATE + PO      Take 2,000 mg by mouth daily 2 tabs        carboxymethylcellulose 0.5 % Soln  ophthalmic solution    REFRESH PLUS     1 drop 4 times daily        CLARINEX PO      Take by mouth daily Taking claritin        COMPAZINE PO      Take 10 mg by mouth daily as needed        cycloSPORINE 0.05 % ophthalmic emulsion    RESTASIS     Place 1 drop into both eyes every 12 hours        DAILY MULTIVITAMIN PO      Take 1 tablet by mouth daily.    Routine general medical examination at a health care facility       dexamethasone 4 MG tablet    DECADRON    28 tablet    Take 20mg (5 tablets) by mouth every week on the morning of velcade injection.    Multiple myeloma not having achieved remission (H)       erythromycin ophthalmic ointment    ROMYCIN     Place 1 Application into both eyes At Bedtime        lidocaine-prilocaine cream    EMLA    30 g    Apply topically as needed for moderate pain Apply dollop size amount to port site 30-60 min prior to accessing    Multiple myeloma not having achieved remission (H)       LORazepam 0.5 MG tablet    ATIVAN    30 tablet    Take 1 tablet (0.5 mg) by mouth every 8 hours as needed for anxiety    Multiple myeloma not having achieved remission (H)       metoprolol succinate 50 MG 24 hr tablet    TOPROL-XL    270 tablet    TAKE 2 TABLETS BY MOUTH EVERY MORNING, AND 1 TABLET EVERY EVENING    Paroxysmal atrial fibrillation (H)       oxyCODONE IR 15 MG tablet    ROXICODONE    60 tablet    Take 1 tablet (15 mg) by mouth every 8 hours as needed for pain maximum 4 tablet(s) per day    Multiple myeloma not having achieved remission (H)       polyethylene glycol powder    MIRALAX/GLYCOLAX     Take 1 capful by mouth daily as needed    Bilateral leg edema       SIMBRINZA 1-0.2 % ophthalmic suspension   Generic drug:  brinzolamide-brimonidine      Place 1 drop into the right eye 2 times daily 1 drop AM and PM        triamcinolone 0.5 % cream    KENALOG    15 g    Apply sparingly to affected area three times daily. 1-2 weeks , as needed    Rash and nonspecific skin eruption       TYLENOL  PO      Take 500 mg by mouth every 6 hours as needed for mild pain or fever        UNABLE TO FIND      MEDICATION NAME: Fresh Coat eye drops        VITAMIN D3 PO      Take 1,000 Units by mouth daily        warfarin 4 MG tablet    COUMADIN    110 tablet    Take 1-2 tablets daily as directed by INR clinic.    Paroxysmal atrial fibrillation (H), Long-term (current) use of anticoagulants       ZOMETA IV      Inject into the vein every 30 days Every 3 month dosing

## 2018-04-04 ENCOUNTER — HOSPITAL ENCOUNTER (OUTPATIENT)
Facility: CLINIC | Age: 83
Setting detail: SPECIMEN
Discharge: HOME | End: 2018-04-04
Attending: INTERNAL MEDICINE | Admitting: INTERNAL MEDICINE
Payer: MEDICARE

## 2018-04-04 ENCOUNTER — ANTICOAGULATION THERAPY VISIT (OUTPATIENT)
Dept: FAMILY MEDICINE | Facility: CLINIC | Age: 83
End: 2018-04-04
Payer: COMMERCIAL

## 2018-04-04 ENCOUNTER — INFUSION THERAPY VISIT (OUTPATIENT)
Dept: INFUSION THERAPY | Facility: CLINIC | Age: 83
End: 2018-04-04
Attending: INTERNAL MEDICINE
Payer: MEDICARE

## 2018-04-04 VITALS
TEMPERATURE: 98.1 F | BODY MASS INDEX: 23.24 KG/M2 | HEART RATE: 82 BPM | SYSTOLIC BLOOD PRESSURE: 123 MMHG | HEIGHT: 66 IN | RESPIRATION RATE: 16 BRPM | DIASTOLIC BLOOD PRESSURE: 72 MMHG | WEIGHT: 144.6 LBS

## 2018-04-04 DIAGNOSIS — I48.0 PAROXYSMAL ATRIAL FIBRILLATION (H): ICD-10-CM

## 2018-04-04 DIAGNOSIS — C90.00 MULTIPLE MYELOMA NOT HAVING ACHIEVED REMISSION (H): Primary | ICD-10-CM

## 2018-04-04 LAB
BASOPHILS # BLD AUTO: 0 10E9/L (ref 0–0.2)
BASOPHILS NFR BLD AUTO: 0 %
DIFFERENTIAL METHOD BLD: ABNORMAL
EOSINOPHIL # BLD AUTO: 0 10E9/L (ref 0–0.7)
EOSINOPHIL NFR BLD AUTO: 0.3 %
ERYTHROCYTE [DISTWIDTH] IN BLOOD BY AUTOMATED COUNT: 14.9 % (ref 10–15)
HCT VFR BLD AUTO: 37.6 % (ref 35–47)
HGB BLD-MCNC: 12.3 G/DL (ref 11.7–15.7)
IMM GRANULOCYTES # BLD: 0 10E9/L (ref 0–0.4)
IMM GRANULOCYTES NFR BLD: 0.3 %
INR PPP: 2.44 (ref 0.86–1.14)
LYMPHOCYTES # BLD AUTO: 0.5 10E9/L (ref 0.8–5.3)
LYMPHOCYTES NFR BLD AUTO: 6.6 %
MCH RBC QN AUTO: 32.5 PG (ref 26.5–33)
MCHC RBC AUTO-ENTMCNC: 32.7 G/DL (ref 31.5–36.5)
MCV RBC AUTO: 100 FL (ref 78–100)
MONOCYTES # BLD AUTO: 0.2 10E9/L (ref 0–1.3)
MONOCYTES NFR BLD AUTO: 3.1 %
NEUTROPHILS # BLD AUTO: 6.6 10E9/L (ref 1.6–8.3)
NEUTROPHILS NFR BLD AUTO: 89.7 %
NRBC # BLD AUTO: 0 10*3/UL
NRBC BLD AUTO-RTO: 0 /100
PLATELET # BLD AUTO: 130 10E9/L (ref 150–450)
RBC # BLD AUTO: 3.78 10E12/L (ref 3.8–5.2)
WBC # BLD AUTO: 7.3 10E9/L (ref 4–11)

## 2018-04-04 PROCEDURE — 85610 PROTHROMBIN TIME: CPT | Performed by: INTERNAL MEDICINE

## 2018-04-04 PROCEDURE — 96401 CHEMO ANTI-NEOPL SQ/IM: CPT

## 2018-04-04 PROCEDURE — 85025 COMPLETE CBC W/AUTO DIFF WBC: CPT | Performed by: INTERNAL MEDICINE

## 2018-04-04 PROCEDURE — 36415 COLL VENOUS BLD VENIPUNCTURE: CPT

## 2018-04-04 PROCEDURE — 25000128 H RX IP 250 OP 636: Mod: JW | Performed by: INTERNAL MEDICINE

## 2018-04-04 RX ORDER — DEXAMETHASONE 4 MG/1
TABLET ORAL
Qty: 28 TABLET | Refills: 0 | Status: SHIPPED | OUTPATIENT
Start: 2018-04-04 | End: 2018-07-18

## 2018-04-04 RX ADMIN — BORTEZOMIB 2.3 MG: 3.5 INJECTION, POWDER, LYOPHILIZED, FOR SOLUTION INTRAVENOUS; SUBCUTANEOUS at 11:10

## 2018-04-04 ASSESSMENT — PAIN SCALES - GENERAL: PAINLEVEL: NO PAIN (0)

## 2018-04-04 NOTE — PROGRESS NOTES
Infusion Nursing Note:  Amira Arreola presents today for labs, velcade.    Patient seen by provider today: No   present during visit today: Not Applicable.    Note: N/A.    Intravenous Access:  No Intravenous access/labs at this visit.  Lab draw site right lower arm, Needle type BF, Gauge 23.    Treatment Conditions:  Lab Results   Component Value Date    HGB 12.3 04/04/2018     Lab Results   Component Value Date    WBC 7.3 04/04/2018      Lab Results   Component Value Date    ANEU 6.6 04/04/2018     Lab Results   Component Value Date     04/04/2018      Results reviewed, labs MET treatment parameters, ok to proceed with treatment.      Post Infusion Assessment:  Patient tolerated injection without incident.  Site patent and intact, free from redness, edema or discomfort.    Discharge Plan: Refill of dex PO tabs sent to [facility]. L/M on patient's cell phone to  dex at [facility] and If you have any questions please call our clinic at [phone].  Discharge instructions reviewed with: Patient.  Patient and/or family verbalized understanding of discharge instructions and all questions answered.  Copy of AVS reviewed with patient and/or family.  Patient will return 4/11/18 for next appointment.  Patient discharged in stable condition accompanied by: self.  Departure Mode: Ambulatory.    SHELLIE Smart RN

## 2018-04-04 NOTE — PROGRESS NOTES
"  ANTICOAGULATION FOLLOW-UP CLINIC VISIT    Patient Name:  Amira Arreola  Date:  4/4/2018  Contact Type:  Telephone    SUBJECTIVE:     Patient Findings     Positives No Problem Findings           OBJECTIVE    INR   Date Value Ref Range Status   04/04/2018 2.44 (H) 0.86 - 1.14 Final       ASSESSMENT / PLAN  INR assessment THER    Recheck INR In: 2 WEEKS    INR Location Outside lab      Anticoagulation Summary as of 4/4/2018     INR goal 2.0-3.0   Today's INR 2.44   Maintenance plan 6 mg (4 mg x 1.5) on Mon, Fri; 4 mg (4 mg x 1) all other days   Full instructions 4/6: 4 mg; 4/9: 4 mg; 4/13: 4 mg; 4/16: 4 mg; 4/20: 4 mg; Otherwise 6 mg on Mon, Fri; 4 mg all other days   Weekly total 32 mg   Plan last modified Tigist Corral RN (1/10/2018)   Next INR check    Target end date Indefinite    Indications   Long-term (current) use of anticoagulants [Z79.01] [Z79.01]  Atrial fibrillation (H) [I48.91] (Resolved) [I48.91]         Anticoagulation Episode Summary     INR check location     Preferred lab     Send INR reminders to [facility] ANTICOAGULATION    Comments patient have standing INR order to be draw at infusion visit.  advise to call [facility] when have INR draw for dosing instruction.  patient can be reach at home phone [phone]      Anticoagulation Care Providers     Provider Role Specialty Phone number    Addy Frias MD [facility] [phone]            See the Encounter Report to view Anticoagulation Flowsheet and Dosing Calendar (Go to Encounters tab in chart review, and find the Anticoagulation Therapy Visit)    Dosage adjustment made based on physician directed care plan. INR 2.44 done at  infusion center. Reached patient. She is very consistently therapeutic. States she \"almost always takes 4 mg daily unless having a lot of greens, then may take 6 mg sometimes\".  I am not sure how to \"correct\" all of her flow sheets over past month(s),so just changed the maintenance plan to reflect 4 " mg daily.    Tigist Corral RN

## 2018-04-04 NOTE — MR AVS SNAPSHOT
After Visit Summary   4/4/2018    Amira Arreola    MRN: [id number]           Patient Information     Date Of Birth          7/17/1932        Visit Information        Provider Department      4/4/2018 9:30 AM  INFUSION CHAIR 18 [facility]        Today's Diagnoses     Multiple myeloma not having achieved remission (H)    -  1    Paroxysmal atrial fibrillation (H)           Follow-ups after your visit        Your next 10 appointments already scheduled     Apr 11, 2018 10:00 AM CDT   Level 5 with SH INFUSION CHAIR 4   [facility] ([facility])    [facility]  [address]  [place] [address]   [phone]            Apr 18, 2018  9:30 AM CDT   Level 2 with SH INFUSION CHAIR 8   [facility] ([facility])    [facility]  [address]  [place] [address]   [phone]            Apr 25, 2018  9:30 AM CDT   Level 2 with SH INFUSION CHAIR 16   [facility] ([facility])    [facility]  [address]  [place] [address]   [phone]            Apr 25, 2018 10:00 AM CDT   Return Visit with Shayne Roberts MD   [facility] ([facility])    [facility]  [address]  [place] [address]   [phone]            Apr 30, 2018 10:15 AM CDT   Return Visit with Ramos Rivera MD   [facility] ([facility])    [address]  [place] [address] [phone] OPT 2              Who to contact     If you have questions or need follow up information about today's clinic visit or your schedule please contact [facility] directly at [phone].  Normal or non-critical lab and imaging results  "will be communicated to you by MyChart, letter or phone within 4 business days after the clinic has received the results. If you do not hear from us within 7 days, please contact the clinic through Yozio or phone. If you have a critical or abnormal lab result, we will notify you by phone as soon as possible.  Submit refill requests through Yozio or call your pharmacy and they will forward the refill request to us. Please allow 3 business days for your refill to be completed.          Additional Information About Your Visit        Yozio Information     Yozio lets you send messages to your doctor, view your test results, renew your prescriptions, schedule appointments and more. To sign up, go to [email or web address] . Click on \"Log in\" on the left side of the screen, which will take you to the Welcome page. Then click on \"Sign up Now\" on the right side of the page.     You will be asked to enter the access code listed below, as well as some personal information. Please follow the directions to create your username and password.     Your access code is: [email or web address]  Expires: 7/3/2018  3:28 PM     Your access code will  in 90 days. If you need help or a new code, please call your [facility] clinic or [phone].        Care EveryWhere ID     This is your Care EveryWhere ID. This could be used by other organizations to access your [facility] medical records  [id number]        Your Vitals Were     Pulse Temperature Respirations Height BMI (Body Mass Index)       82 98.1  F (36.7  C) (Oral) 16 1.664 m (5' 5.51\") 23.69 kg/m2        Blood Pressure from Last 3 Encounters:   18 123/72   18 126/83   18 126/83    Weight from Last 3 Encounters:   18 65.6 kg (144 lb 9.6 oz)   18 67.1 kg (148 lb)   18 67.1 kg (148 lb)              We Performed the Following     CBC with platelets differential     INR          Where to get your medicines      These medications were sent to " [id number] - [place] - [address] AT [facility] [address]  [address], [place] [address]     Phone:  [phone]     dexamethasone 4 MG tablet          Primary Care Provider Office Phone # Fax #    Addy Sean Frias [phone] [address]  [place] [address]        Equal Access to Services     SARA ZELAYA AH: Hadii aad ku hadasho Soomaali, waaxda luqadaha, qaybta kaalmada adeegyada, waxay idiin hayaan adeeg kharash la'aan ah. So [facility] [phone].    ATENCIÓN: Si habla español, tiene a barlow disposición servicios gratuitos de asistencia lingüística. [facility] [phone].    We comply with applicable federal civil rights laws and [place] laws. We do not discriminate on the basis of race, color, national origin, age, disability, sex, sexual orientation, or gender identity.            Thank you!     Thank you for choosing [facility] AND [facility]  for your care. Our goal is always to provide you with excellent care. Hearing back from our patients is one way we can continue to improve our services. Please take a few minutes to complete the written survey that you may receive in the mail after your visit with us. Thank you!             Your Updated Medication List - Protect others around you: Learn how to safely use, store and throw away your medicines at www.disposemymeds.org.          This list is accurate as of 4/4/18  3:28 PM.  Always use your most recent med list.                   Brand Name Dispense Instructions for use Diagnosis    acyclovir 400 MG tablet    ZOVIRAX    60 tablet    Take 1 tablet (400 mg) by mouth 2 times daily Start 1 week prior to daratumumab and continue for 3 months after treatment is complete.    Multiple myeloma not having achieved remission (H)       CALCIUM CITRATE + PO      Take 2,000 mg by mouth daily 2 tabs        carboxymethylcellulose 0.5 % Soln ophthalmic solution    REFRESH PLUS     1 drop 4  times daily        CLARINEX PO      Take by mouth daily Taking claritin        COMPAZINE PO      Take 10 mg by mouth daily as needed        cycloSPORINE 0.05 % ophthalmic emulsion    RESTASIS     Place 1 drop into both eyes every 12 hours        DAILY MULTIVITAMIN PO      Take 1 tablet by mouth daily.    Routine general medical examination at a health care facility       dexamethasone 4 MG tablet    DECADRON    28 tablet    Take 20mg (5 tablets) by mouth every week on the morning of velcade injection.    Multiple myeloma not having achieved remission (H)       erythromycin ophthalmic ointment    ROMYCIN     Place 1 Application into both eyes At Bedtime        lidocaine-prilocaine cream    EMLA    30 g    Apply topically as needed for moderate pain Apply dollop size amount to port site 30-60 min prior to accessing    Multiple myeloma not having achieved remission (H)       LORazepam 0.5 MG tablet    ATIVAN    30 tablet    Take 1 tablet (0.5 mg) by mouth every 8 hours as needed for anxiety    Multiple myeloma not having achieved remission (H)       metoprolol succinate 50 MG 24 hr tablet    TOPROL-XL    270 tablet    TAKE 2 TABLETS BY MOUTH EVERY MORNING, AND 1 TABLET EVERY EVENING    Paroxysmal atrial fibrillation (H)       oxyCODONE IR 15 MG tablet    ROXICODONE    60 tablet    Take 1 tablet (15 mg) by mouth every 8 hours as needed for pain maximum 4 tablet(s) per day    Multiple myeloma not having achieved remission (H)       polyethylene glycol powder    MIRALAX/GLYCOLAX     Take 1 capful by mouth daily as needed    Bilateral leg edema       SIMBRINZA 1-0.2 % ophthalmic suspension   Generic drug:  brinzolamide-brimonidine      Place 1 drop into the right eye 2 times daily 1 drop AM and PM        triamcinolone 0.5 % cream    KENALOG    15 g    Apply sparingly to affected area three times daily. 1-2 weeks , as needed    Rash and nonspecific skin eruption       TYLENOL PO      Take 500 mg by mouth every 6 hours as  needed for mild pain or fever        UNABLE TO FIND      MEDICATION NAME: Fresh Coat eye drops        VITAMIN D3 PO      Take 1,000 Units by mouth daily        warfarin 4 MG tablet    COUMADIN    110 tablet    Take 1-2 tablets daily as directed by INR clinic.    Paroxysmal atrial fibrillation (H), Long-term (current) use of anticoagulants       ZOMETA IV      Inject into the vein every 30 days Every 3 month dosing

## 2018-04-04 NOTE — MR AVS SNAPSHOT
Amira Arreola   4/4/2018   Anticoagulation Therapy Visit    Description:  85 year old female   Provider:  Addy Frias MD   Department:  [facility]           INR as of 4/4/2018     Today's INR 2.44      Anticoagulation Summary as of 4/4/2018     INR goal 2.0-3.0   Today's INR 2.44   Full instructions 4/6: 4 mg; 4/9: 4 mg; 4/13: 4 mg; 4/16: 4 mg; 4/20: 4 mg; Otherwise 4 mg every day   Next INR check 4/18/2018    Indications   Long-term (current) use of anticoagulants [Z79.01] [Z79.01]  Atrial fibrillation (H) [I48.91] (Resolved) [I48.91]         Description     STATES SHE HAS BEEN TAKING 4 mg daily for a very long time. Occasionally takes a 6 mg dose if she's had salad. Maintenance plan changed.      April 2018 Details    Sun Mon Tue Wed Thu Fri Sat     1               2               3               4      4 mg   See details      5      4 mg         6      4 mg         7      4 mg           8      4 mg         9      4 mg         10      4 mg         11      4 mg         12      4 mg         13      4 mg         14      4 mg           15      4 mg         16      4 mg         17      4 mg         18            19               20               21                 22               23               24               25               26               27               28                 29               30                     Date Details   04/04 This INR check       Date of next INR:  4/18/2018         How to take your warfarin dose     To take:  4 mg Take 1 of the 4 mg tablets.

## 2018-04-05 DIAGNOSIS — C90.00 MULTIPLE MYELOMA NOT HAVING ACHIEVED REMISSION (H): ICD-10-CM

## 2018-04-05 RX ORDER — LIDOCAINE/PRILOCAINE 2.5 %-2.5%
CREAM (GRAM) TOPICAL PRN
Qty: 30 G | Refills: 1 | Status: SHIPPED | OUTPATIENT
Start: 2018-04-05 | End: 2018-08-15

## 2018-04-10 RX ORDER — EPINEPHRINE 0.3 MG/.3ML
0.3 INJECTION SUBCUTANEOUS EVERY 5 MIN PRN
Status: CANCELLED | OUTPATIENT
Start: 2018-04-18

## 2018-04-10 RX ORDER — SODIUM CHLORIDE 9 MG/ML
1000 INJECTION, SOLUTION INTRAVENOUS CONTINUOUS PRN
Status: CANCELLED
Start: 2018-05-02

## 2018-04-10 RX ORDER — EPINEPHRINE 1 MG/ML
0.3 INJECTION, SOLUTION INTRAMUSCULAR; SUBCUTANEOUS EVERY 5 MIN PRN
Status: CANCELLED | OUTPATIENT
Start: 2018-04-25

## 2018-04-10 RX ORDER — EPINEPHRINE 0.3 MG/.3ML
0.3 INJECTION SUBCUTANEOUS EVERY 5 MIN PRN
Status: CANCELLED | OUTPATIENT
Start: 2018-04-11

## 2018-04-10 RX ORDER — SODIUM CHLORIDE 9 MG/ML
1000 INJECTION, SOLUTION INTRAVENOUS CONTINUOUS PRN
Status: CANCELLED
Start: 2018-04-25

## 2018-04-10 RX ORDER — METHYLPREDNISOLONE SODIUM SUCCINATE 125 MG/2ML
125 INJECTION, POWDER, LYOPHILIZED, FOR SOLUTION INTRAMUSCULAR; INTRAVENOUS
Status: CANCELLED
Start: 2018-04-25

## 2018-04-10 RX ORDER — EPINEPHRINE 1 MG/ML
0.3 INJECTION, SOLUTION INTRAMUSCULAR; SUBCUTANEOUS EVERY 5 MIN PRN
Status: CANCELLED | OUTPATIENT
Start: 2018-05-02

## 2018-04-10 RX ORDER — HEPARIN SODIUM (PORCINE) LOCK FLUSH IV SOLN 100 UNIT/ML 100 UNIT/ML
5 SOLUTION INTRAVENOUS EVERY 8 HOURS
Status: CANCELLED
Start: 2018-05-02

## 2018-04-10 RX ORDER — METHYLPREDNISOLONE SODIUM SUCCINATE 125 MG/2ML
125 INJECTION, POWDER, LYOPHILIZED, FOR SOLUTION INTRAMUSCULAR; INTRAVENOUS
Status: CANCELLED
Start: 2018-04-18

## 2018-04-10 RX ORDER — EPINEPHRINE 1 MG/ML
0.3 INJECTION, SOLUTION INTRAMUSCULAR; SUBCUTANEOUS EVERY 5 MIN PRN
Status: CANCELLED | OUTPATIENT
Start: 2018-04-11

## 2018-04-10 RX ORDER — METHYLPREDNISOLONE SODIUM SUCCINATE 125 MG/2ML
125 INJECTION, POWDER, LYOPHILIZED, FOR SOLUTION INTRAMUSCULAR; INTRAVENOUS
Status: CANCELLED
Start: 2018-04-11

## 2018-04-10 RX ORDER — ALBUTEROL SULFATE 0.83 MG/ML
2.5 SOLUTION RESPIRATORY (INHALATION)
Status: CANCELLED | OUTPATIENT
Start: 2018-04-25

## 2018-04-10 RX ORDER — HEPARIN SODIUM (PORCINE) LOCK FLUSH IV SOLN 100 UNIT/ML 100 UNIT/ML
5 SOLUTION INTRAVENOUS EVERY 8 HOURS
Status: CANCELLED
Start: 2018-04-11

## 2018-04-10 RX ORDER — MEPERIDINE HYDROCHLORIDE 25 MG/ML
25 INJECTION INTRAMUSCULAR; INTRAVENOUS; SUBCUTANEOUS EVERY 30 MIN PRN
Status: CANCELLED | OUTPATIENT
Start: 2018-04-25

## 2018-04-10 RX ORDER — LORAZEPAM 2 MG/ML
0.5 INJECTION INTRAMUSCULAR EVERY 4 HOURS PRN
Status: CANCELLED
Start: 2018-04-11

## 2018-04-10 RX ORDER — ALBUTEROL SULFATE 90 UG/1
1-2 AEROSOL, METERED RESPIRATORY (INHALATION)
Status: CANCELLED
Start: 2018-05-02

## 2018-04-10 RX ORDER — LORAZEPAM 2 MG/ML
0.5 INJECTION INTRAMUSCULAR EVERY 4 HOURS PRN
Status: CANCELLED
Start: 2018-05-02

## 2018-04-10 RX ORDER — LORAZEPAM 2 MG/ML
0.5 INJECTION INTRAMUSCULAR EVERY 4 HOURS PRN
Status: CANCELLED
Start: 2018-04-18

## 2018-04-10 RX ORDER — MEPERIDINE HYDROCHLORIDE 25 MG/ML
25 INJECTION INTRAMUSCULAR; INTRAVENOUS; SUBCUTANEOUS EVERY 30 MIN PRN
Status: CANCELLED | OUTPATIENT
Start: 2018-04-18

## 2018-04-10 RX ORDER — ALBUTEROL SULFATE 90 UG/1
1-2 AEROSOL, METERED RESPIRATORY (INHALATION)
Status: CANCELLED
Start: 2018-04-18

## 2018-04-10 RX ORDER — MEPERIDINE HYDROCHLORIDE 25 MG/ML
25 INJECTION INTRAMUSCULAR; INTRAVENOUS; SUBCUTANEOUS EVERY 30 MIN PRN
Status: CANCELLED | OUTPATIENT
Start: 2018-05-02

## 2018-04-10 RX ORDER — DIPHENHYDRAMINE HYDROCHLORIDE 50 MG/ML
50 INJECTION INTRAMUSCULAR; INTRAVENOUS
Status: CANCELLED
Start: 2018-04-11

## 2018-04-10 RX ORDER — HEPARIN SODIUM (PORCINE) LOCK FLUSH IV SOLN 100 UNIT/ML 100 UNIT/ML
5 SOLUTION INTRAVENOUS EVERY 8 HOURS
Status: CANCELLED
Start: 2018-04-18

## 2018-04-10 RX ORDER — ALBUTEROL SULFATE 90 UG/1
1-2 AEROSOL, METERED RESPIRATORY (INHALATION)
Status: CANCELLED
Start: 2018-04-11

## 2018-04-10 RX ORDER — SODIUM CHLORIDE 9 MG/ML
1000 INJECTION, SOLUTION INTRAVENOUS CONTINUOUS PRN
Status: CANCELLED
Start: 2018-04-18

## 2018-04-10 RX ORDER — EPINEPHRINE 0.3 MG/.3ML
0.3 INJECTION SUBCUTANEOUS EVERY 5 MIN PRN
Status: CANCELLED | OUTPATIENT
Start: 2018-05-02

## 2018-04-10 RX ORDER — HEPARIN SODIUM (PORCINE) LOCK FLUSH IV SOLN 100 UNIT/ML 100 UNIT/ML
5 SOLUTION INTRAVENOUS EVERY 8 HOURS
Status: CANCELLED
Start: 2018-04-25

## 2018-04-10 RX ORDER — EPINEPHRINE 0.3 MG/.3ML
0.3 INJECTION SUBCUTANEOUS EVERY 5 MIN PRN
Status: CANCELLED | OUTPATIENT
Start: 2018-04-25

## 2018-04-10 RX ORDER — METHYLPREDNISOLONE SODIUM SUCCINATE 125 MG/2ML
125 INJECTION, POWDER, LYOPHILIZED, FOR SOLUTION INTRAMUSCULAR; INTRAVENOUS
Status: CANCELLED
Start: 2018-05-02

## 2018-04-10 RX ORDER — ALBUTEROL SULFATE 0.83 MG/ML
2.5 SOLUTION RESPIRATORY (INHALATION)
Status: CANCELLED | OUTPATIENT
Start: 2018-04-18

## 2018-04-10 RX ORDER — DIPHENHYDRAMINE HCL 25 MG
50 CAPSULE ORAL ONCE
Status: CANCELLED | OUTPATIENT
Start: 2018-04-11

## 2018-04-10 RX ORDER — SODIUM CHLORIDE 9 MG/ML
1000 INJECTION, SOLUTION INTRAVENOUS CONTINUOUS PRN
Status: CANCELLED
Start: 2018-04-11

## 2018-04-10 RX ORDER — DIPHENHYDRAMINE HYDROCHLORIDE 50 MG/ML
50 INJECTION INTRAMUSCULAR; INTRAVENOUS
Status: CANCELLED
Start: 2018-04-25

## 2018-04-10 RX ORDER — DIPHENHYDRAMINE HYDROCHLORIDE 50 MG/ML
50 INJECTION INTRAMUSCULAR; INTRAVENOUS
Status: CANCELLED
Start: 2018-05-02

## 2018-04-10 RX ORDER — ALBUTEROL SULFATE 90 UG/1
1-2 AEROSOL, METERED RESPIRATORY (INHALATION)
Status: CANCELLED
Start: 2018-04-25

## 2018-04-10 RX ORDER — EPINEPHRINE 1 MG/ML
0.3 INJECTION, SOLUTION INTRAMUSCULAR; SUBCUTANEOUS EVERY 5 MIN PRN
Status: CANCELLED | OUTPATIENT
Start: 2018-04-18

## 2018-04-10 RX ORDER — LORAZEPAM 2 MG/ML
0.5 INJECTION INTRAMUSCULAR EVERY 4 HOURS PRN
Status: CANCELLED
Start: 2018-04-25

## 2018-04-10 RX ORDER — ALBUTEROL SULFATE 0.83 MG/ML
2.5 SOLUTION RESPIRATORY (INHALATION)
Status: CANCELLED | OUTPATIENT
Start: 2018-05-02

## 2018-04-10 RX ORDER — MEPERIDINE HYDROCHLORIDE 25 MG/ML
25 INJECTION INTRAMUSCULAR; INTRAVENOUS; SUBCUTANEOUS EVERY 30 MIN PRN
Status: CANCELLED | OUTPATIENT
Start: 2018-04-11

## 2018-04-10 RX ORDER — ALBUTEROL SULFATE 0.83 MG/ML
2.5 SOLUTION RESPIRATORY (INHALATION)
Status: CANCELLED | OUTPATIENT
Start: 2018-04-11

## 2018-04-10 RX ORDER — DIPHENHYDRAMINE HYDROCHLORIDE 50 MG/ML
50 INJECTION INTRAMUSCULAR; INTRAVENOUS
Status: CANCELLED
Start: 2018-04-18

## 2018-04-10 RX ORDER — ACETAMINOPHEN 325 MG/1
650 TABLET ORAL ONCE
Status: CANCELLED | OUTPATIENT
Start: 2018-04-11

## 2018-04-11 ENCOUNTER — INFUSION THERAPY VISIT (OUTPATIENT)
Dept: INFUSION THERAPY | Facility: CLINIC | Age: 83
End: 2018-04-11
Attending: INTERNAL MEDICINE
Payer: MEDICARE

## 2018-04-11 ENCOUNTER — HOSPITAL ENCOUNTER (OUTPATIENT)
Facility: CLINIC | Age: 83
Setting detail: SPECIMEN
Discharge: HOME | End: 2018-04-11
Attending: INTERNAL MEDICINE | Admitting: INTERNAL MEDICINE
Payer: MEDICARE

## 2018-04-11 VITALS
DIASTOLIC BLOOD PRESSURE: 70 MMHG | BODY MASS INDEX: 23.85 KG/M2 | RESPIRATION RATE: 16 BRPM | HEIGHT: 66 IN | SYSTOLIC BLOOD PRESSURE: 118 MMHG | HEART RATE: 85 BPM | TEMPERATURE: 98.6 F | WEIGHT: 148.4 LBS

## 2018-04-11 DIAGNOSIS — I48.0 PAROXYSMAL ATRIAL FIBRILLATION (H): ICD-10-CM

## 2018-04-11 DIAGNOSIS — C90.00 MULTIPLE MYELOMA NOT HAVING ACHIEVED REMISSION (H): Primary | ICD-10-CM

## 2018-04-11 LAB
ALBUMIN SERPL-MCNC: 3.4 G/DL (ref 3.4–5)
ALP SERPL-CCNC: 52 U/L (ref 40–150)
ALT SERPL W P-5'-P-CCNC: 25 U/L (ref 0–50)
AST SERPL W P-5'-P-CCNC: 18 U/L (ref 0–45)
BASOPHILS # BLD AUTO: 0 10E9/L (ref 0–0.2)
BASOPHILS NFR BLD AUTO: 0 %
BILIRUB DIRECT SERPL-MCNC: 0.1 MG/DL (ref 0–0.2)
BILIRUB SERPL-MCNC: 0.5 MG/DL (ref 0.2–1.3)
DIFFERENTIAL METHOD BLD: ABNORMAL
EOSINOPHIL # BLD AUTO: 0 10E9/L (ref 0–0.7)
EOSINOPHIL NFR BLD AUTO: 0 %
ERYTHROCYTE [DISTWIDTH] IN BLOOD BY AUTOMATED COUNT: 14.9 % (ref 10–15)
HCT VFR BLD AUTO: 38.2 % (ref 35–47)
HGB BLD-MCNC: 12.8 G/DL (ref 11.7–15.7)
IMM GRANULOCYTES # BLD: 0 10E9/L (ref 0–0.4)
IMM GRANULOCYTES NFR BLD: 0.4 %
INR PPP: 2.83 (ref 0.86–1.14)
LYMPHOCYTES # BLD AUTO: 0.4 10E9/L (ref 0.8–5.3)
LYMPHOCYTES NFR BLD AUTO: 4.9 %
MCH RBC QN AUTO: 33.3 PG (ref 26.5–33)
MCHC RBC AUTO-ENTMCNC: 33.5 G/DL (ref 31.5–36.5)
MCV RBC AUTO: 100 FL (ref 78–100)
MONOCYTES # BLD AUTO: 0.2 10E9/L (ref 0–1.3)
MONOCYTES NFR BLD AUTO: 2 %
NEUTROPHILS # BLD AUTO: 7.7 10E9/L (ref 1.6–8.3)
NEUTROPHILS NFR BLD AUTO: 92.7 %
PLATELET # BLD AUTO: 148 10E9/L (ref 150–450)
PROT SERPL-MCNC: 5.9 G/DL (ref 6.8–8.8)
RBC # BLD AUTO: 3.84 10E12/L (ref 3.8–5.2)
WBC # BLD AUTO: 8.3 10E9/L (ref 4–11)

## 2018-04-11 PROCEDURE — 80076 HEPATIC FUNCTION PANEL: CPT | Performed by: INTERNAL MEDICINE

## 2018-04-11 PROCEDURE — 85025 COMPLETE CBC W/AUTO DIFF WBC: CPT | Performed by: INTERNAL MEDICINE

## 2018-04-11 PROCEDURE — 96401 CHEMO ANTI-NEOPL SQ/IM: CPT

## 2018-04-11 PROCEDURE — 00000402 ZZHCL STATISTIC TOTAL PROTEIN: Performed by: INTERNAL MEDICINE

## 2018-04-11 PROCEDURE — 96413 CHEMO IV INFUSION 1 HR: CPT

## 2018-04-11 PROCEDURE — 84165 PROTEIN E-PHORESIS SERUM: CPT | Performed by: INTERNAL MEDICINE

## 2018-04-11 PROCEDURE — 85610 PROTHROMBIN TIME: CPT | Performed by: INTERNAL MEDICINE

## 2018-04-11 PROCEDURE — 83883 ASSAY NEPHELOMETRY NOT SPEC: CPT | Performed by: INTERNAL MEDICINE

## 2018-04-11 PROCEDURE — 25000128 H RX IP 250 OP 636: Mod: JW | Performed by: INTERNAL MEDICINE

## 2018-04-11 PROCEDURE — 25000132 ZZH RX MED GY IP 250 OP 250 PS 637: Mod: GY | Performed by: INTERNAL MEDICINE

## 2018-04-11 PROCEDURE — A9270 NON-COVERED ITEM OR SERVICE: HCPCS | Mod: GY | Performed by: INTERNAL MEDICINE

## 2018-04-11 PROCEDURE — 96415 CHEMO IV INFUSION ADDL HR: CPT

## 2018-04-11 PROCEDURE — 96375 TX/PRO/DX INJ NEW DRUG ADDON: CPT

## 2018-04-11 RX ORDER — DIPHENHYDRAMINE HCL 25 MG
50 CAPSULE ORAL ONCE
Status: COMPLETED | OUTPATIENT
Start: 2018-04-11 | End: 2018-04-11

## 2018-04-11 RX ORDER — ACETAMINOPHEN 325 MG/1
650 TABLET ORAL ONCE
Status: COMPLETED | OUTPATIENT
Start: 2018-04-11 | End: 2018-04-11

## 2018-04-11 RX ORDER — HEPARIN SODIUM (PORCINE) LOCK FLUSH IV SOLN 100 UNIT/ML 100 UNIT/ML
5 SOLUTION INTRAVENOUS EVERY 8 HOURS
Status: DISCONTINUED | OUTPATIENT
Start: 2018-04-11 | End: 2018-04-11 | Stop reason: HOSPADM

## 2018-04-11 RX ADMIN — SODIUM CHLORIDE, PRESERVATIVE FREE 5 ML: 5 INJECTION INTRAVENOUS at 14:30

## 2018-04-11 RX ADMIN — DARATUMUMAB 1000 MG: 100 INJECTION, SOLUTION, CONCENTRATE INTRAVENOUS at 11:17

## 2018-04-11 RX ADMIN — SODIUM CHLORIDE 250 ML: 9 INJECTION, SOLUTION INTRAVENOUS at 10:46

## 2018-04-11 RX ADMIN — DEXAMETHASONE SODIUM PHOSPHATE 12 MG: 10 INJECTION, SOLUTION INTRAMUSCULAR; INTRAVENOUS at 11:07

## 2018-04-11 RX ADMIN — ACETAMINOPHEN 650 MG: 325 TABLET ORAL at 10:45

## 2018-04-11 RX ADMIN — BORTEZOMIB 2.3 MG: 3.5 INJECTION, POWDER, LYOPHILIZED, FOR SOLUTION INTRAVENOUS; SUBCUTANEOUS at 11:49

## 2018-04-11 RX ADMIN — DIPHENHYDRAMINE HYDROCHLORIDE 50 MG: 25 CAPSULE ORAL at 10:45

## 2018-04-11 ASSESSMENT — PAIN SCALES - GENERAL: PAINLEVEL: NO PAIN (0)

## 2018-04-11 NOTE — PROGRESS NOTES
Infusion Nursing Note:  Amira Arreola presents today for C12 D1 velcade, darzalex.    Patient seen by provider today: No   present during visit today: Not Applicable.    Note: N/A.    Intravenous Access:  Implanted Port.    Treatment Conditions:  Lab Results   Component Value Date    HGB 12.8 04/11/2018     Lab Results   Component Value Date    WBC 8.3 04/11/2018      Lab Results   Component Value Date    ANEU 7.7 04/11/2018     Lab Results   Component Value Date     04/11/2018      Results reviewed, labs MET treatment parameters, ok to proceed with treatment.      Post Infusion Assessment:  Patient tolerated infusion without incident.  Blood return noted pre and post infusion.  Site patent and intact, free from redness, edema or discomfort.  No evidence of extravasations.  Access discontinued per protocol.    Discharge Plan:   Discharge instructions reviewed with: Patient.  Patient and/or family verbalized understanding of discharge instructions and all questions answered.  Copy of AVS reviewed with patient and/or family.  Patient will return 4/18/18 for next appointment.  Patient discharged in stable condition accompanied by: self.  Departure Mode: Ambulatory.    SHELLIE Bass RN

## 2018-04-12 ENCOUNTER — ALLIED HEALTH/NURSE VISIT (OUTPATIENT)
Dept: ONCOLOGY | Facility: CLINIC | Age: 83
End: 2018-04-12

## 2018-04-12 DIAGNOSIS — Z71.9 COUNSELING NOS(V65.40): Primary | ICD-10-CM

## 2018-04-12 LAB
ALBUMIN SERPL ELPH-MCNC: 3.7 G/DL (ref 3.7–5.1)
ALPHA1 GLOB SERPL ELPH-MCNC: 0.3 G/DL (ref 0.2–0.4)
ALPHA2 GLOB SERPL ELPH-MCNC: 0.7 G/DL (ref 0.5–0.9)
B-GLOBULIN SERPL ELPH-MCNC: 0.6 G/DL (ref 0.6–1)
GAMMA GLOB SERPL ELPH-MCNC: 0.2 G/DL (ref 0.7–1.6)
KAPPA LC UR-MCNC: 2 MG/DL (ref 0.33–1.94)
KAPPA LC/LAMBDA SER: ABNORMAL {RATIO} (ref 0.26–1.65)
LAMBDA LC SERPL-MCNC: <0.25 MG/DL (ref 0.57–2.63)
M PROTEIN SERPL ELPH-MCNC: 0.1 G/DL
PROT PATTERN SERPL ELPH-IMP: ABNORMAL

## 2018-04-12 NOTE — MR AVS SNAPSHOT
After Visit Summary   4/12/2018    Amira Arreola    MRN: [id number]           Patient Information     Date Of Birth          7/17/1932        Visit Information        Provider Department      4/12/2018 8:08 AM Jeniffer Prieto LICSW [facility]        Today's Diagnoses     Counseling NOS(V65.40)    -  1       Follow-ups after your visit        Your next 10 appointments already scheduled     Apr 18, 2018  9:30 AM CDT   Level 2 with SH INFUSION CHAIR 8   [facility] ([facility])    Count includes the [facility]  [address]  [place] [address]   [phone]            Apr 25, 2018  9:30 AM CDT   Level 2 with SH INFUSION CHAIR 18   [facility] ([facility])    [facility]  [address]  [place] [address]   [phone]            Apr 25, 2018 10:00 AM CDT   Return Visit with Shayne Roberts MD   [facility] ([facility])    [facility]  [address]  [place] [address]   [phone]            Apr 30, 2018 10:15 AM CDT   Return Visit with Ramos Rivera MD   [facility] ([facility])    [address]  [place] [address] [phone] OPT 2              Who to contact     If you have questions or need follow up information about today's clinic visit or your schedule please contact [facility] directly at [phone].  Normal or non-critical lab and imaging results will be communicated to you by MyChart, letter or phone within 4 business days after the clinic has received the results. If you do not hear from us within 7 days, please contact the clinic through MyChart or phone. If you have a critical or abnormal lab result, we will notify you by phone as soon as possible.  Submit refill requests through  "MyChart or call your pharmacy and they will forward the refill request to us. Please allow 3 business days for your refill to be completed.          Additional Information About Your Visit        MyChart Information     interspireSubmitt lets you send messages to your doctor, view your test results, renew your prescriptions, schedule appointments and more. To sign up, go to [email or web address] . Click on \"Log in\" on the left side of the screen, which will take you to the Welcome page. Then click on \"Sign up Now\" on the right side of the page.     You will be asked to enter the access code listed below, as well as some personal information. Please follow the directions to create your username and password.     Your access code is: [email or web address]  Expires: 7/3/2018  3:28 PM     Your access code will  in 90 days. If you need help or a new code, please call your [place] clinic or [phone].        Care EveryWhere ID     This is your Care EveryWhere ID. This could be used by other organizations to access your [place] medical records  [id number]         Blood Pressure from Last 3 Encounters:   18 118/70   18 123/72   18 126/83    Weight from Last 3 Encounters:   18 67.3 kg (148 lb 6.4 oz)   18 65.6 kg (144 lb 9.6 oz)   18 67.1 kg (148 lb)              Today, you had the following     No orders found for display       Primary Care Provider Office Phone # Fax #    Addy Frias [phone] [address]  [place] [address]        Equal Access to Services     [facility] Hospital[facility] : Hadii liane Galloway, waaxda luqadaha, qaybta kaalmada alonso, hung sadler. So [facility] [phone].    ATENCIÓN: Si habla español, tiene a barlow disposición servicios gratuitos de asistencia lingüística. Llame al [phone].    We comply with applicable federal civil rights laws and [place] laws. We do not discriminate on the basis of " race, color, national origin, age, disability, sex, sexual orientation, or gender identity.            Thank you!     Thank you for choosing [facility]  for your care. Our goal is always to provide you with excellent care. Hearing back from our patients is one way we can continue to improve our services. Please take a few minutes to complete the written survey that you may receive in the mail after your visit with us. Thank you!             Your Updated Medication List - Protect others around you: Learn how to safely use, store and throw away your medicines at www.disposemymeds.org.          This list is accurate as of 4/12/18  8:41 AM.  Always use your most recent med list.                   Brand Name Dispense Instructions for use Diagnosis    acyclovir 400 MG tablet    ZOVIRAX    60 tablet    Take 1 tablet (400 mg) by mouth 2 times daily Start 1 week prior to daratumumab and continue for 3 months after treatment is complete.    Multiple myeloma not having achieved remission (H)       CALCIUM CITRATE + PO      Take 2,000 mg by mouth daily 2 tabs        carboxymethylcellulose 0.5 % Soln ophthalmic solution    REFRESH PLUS     1 drop 4 times daily        CLARINEX PO      Take by mouth daily Taking claritin        COMPAZINE PO      Take 10 mg by mouth daily as needed        cycloSPORINE 0.05 % ophthalmic emulsion    RESTASIS     Place 1 drop into both eyes every 12 hours        DAILY MULTIVITAMIN PO      Take 1 tablet by mouth daily.    Routine general medical examination at a health care facility       dexamethasone 4 MG tablet    DECADRON    28 tablet    Take 20mg (5 tablets) by mouth every week on the morning of velcade injection.    Multiple myeloma not having achieved remission (H)       erythromycin ophthalmic ointment    ROMYCIN     Place 1 Application into both eyes At Bedtime        lidocaine-prilocaine cream    EMLA    30 g    Apply topically as needed for moderate pain Apply dollop size amount  to port site 30-60 min prior to accessing    Multiple myeloma not having achieved remission (H)       LORazepam 0.5 MG tablet    ATIVAN    30 tablet    Take 1 tablet (0.5 mg) by mouth every 8 hours as needed for anxiety    Multiple myeloma not having achieved remission (H)       metoprolol succinate 50 MG 24 hr tablet    TOPROL-XL    270 tablet    TAKE 2 TABLETS BY MOUTH EVERY MORNING, AND 1 TABLET EVERY EVENING    Paroxysmal atrial fibrillation (H)       oxyCODONE IR 15 MG tablet    ROXICODONE    60 tablet    Take 1 tablet (15 mg) by mouth every 8 hours as needed for pain maximum 4 tablet(s) per day    Multiple myeloma not having achieved remission (H)       polyethylene glycol powder    MIRALAX/GLYCOLAX     Take 1 capful by mouth daily as needed    Bilateral leg edema       SIMBRINZA 1-0.2 % ophthalmic suspension   Generic drug:  brinzolamide-brimonidine      Place 1 drop into the right eye 2 times daily 1 drop AM and PM        triamcinolone 0.5 % cream    KENALOG    15 g    Apply sparingly to affected area three times daily. 1-2 weeks , as needed    Rash and nonspecific skin eruption       TYLENOL PO      Take 500 mg by mouth every 6 hours as needed for mild pain or fever        UNABLE TO FIND      MEDICATION NAME: Fresh Coat eye drops        VITAMIN D3 PO      Take 1,000 Units by mouth daily        warfarin 4 MG tablet    COUMADIN    110 tablet    Take 1-2 tablets daily as directed by INR clinic.    Paroxysmal atrial fibrillation (H), Long-term (current) use of anticoagulants       ZOMETA IV      Inject into the vein every 30 days Every 3 month dosing

## 2018-04-12 NOTE — PROGRESS NOTES
Social Work Progress Note      Data/Intervention:  Patient Name:  Amira Arreola  /Age:  1932 (85 year old)    Reason for Follow-Up:  Amira is a zane 85-year-old woman who comes to clinic today for C12D1 Darzalex, Velcade for treatment of her diagnosis of kappa free light chain multiple myeloma, which she was initially diagnosed with 10/2015. This clinician met with pt today at RN's request for group support.     Intervention:   Amira is a zane  woman who is the proud mother of six children (1 in [place], 1 in [place], 4 in [place]). Amira reports today that children assist her with transportation needs, as neither she nor her  drive. Amira acknowledges that she has a more difficult time managing at home, but acknowledges that it is difficult for her to ask for additional help. This clinician normalized for pt changed needs for home support, and discussed community referrals available for assistance, pt indicated that she is open to accepting paperwork today regarding additional home support, but is most interested in connecting socially with others who are coping with multiple myeloma dx and tx. Information regarding local support groups provided.    Resources Provided:  [facility] to Recovery  Multiple Myeloma Support Group- [place]  Leukemia Lymphoma Society  [employer]  [employer]    Plan:  This clinician will continue to follow pt for ongoing psychosocial support, providing referrals as needed. Pt given this clinician's contact information and knows to reach out in the case of psychosocial distress or concern.     Please call or page if needs or concerns arise.     ALIE Luu, Northern Light Mercy HospitalSW  Direct Phone: [phone]  Pager: [phone]

## 2018-04-13 ENCOUNTER — NURSE TRIAGE (OUTPATIENT)
Dept: NURSING | Facility: CLINIC | Age: 83
End: 2018-04-13

## 2018-04-14 NOTE — TELEPHONE ENCOUNTER
Patient reports that she had her INR done on 4-11-18. Is asking for her results. INR is 2.83. Nurse said she will have the on call doctor call her back with directions for her warfarin.     8:14PM Dr Bhupinder Pugh, on call for [facility], was paged by the Smart Web  to call the patient back at [phone]. The patient was told to call back to FNA if she doesn't hear back from the doctor on call within 20-30 minutes.     Maria E Vanegas RN/FNA

## 2018-04-18 ENCOUNTER — HOSPITAL ENCOUNTER (OUTPATIENT)
Facility: CLINIC | Age: 83
Setting detail: SPECIMEN
Discharge: HOME | End: 2018-04-18
Attending: INTERNAL MEDICINE | Admitting: INTERNAL MEDICINE
Payer: MEDICARE

## 2018-04-18 ENCOUNTER — ANTICOAGULATION THERAPY VISIT (OUTPATIENT)
Dept: FAMILY MEDICINE | Facility: CLINIC | Age: 83
End: 2018-04-18
Payer: COMMERCIAL

## 2018-04-18 ENCOUNTER — INFUSION THERAPY VISIT (OUTPATIENT)
Dept: INFUSION THERAPY | Facility: CLINIC | Age: 83
End: 2018-04-18
Attending: INTERNAL MEDICINE
Payer: MEDICARE

## 2018-04-18 VITALS
SYSTOLIC BLOOD PRESSURE: 132 MMHG | HEART RATE: 77 BPM | RESPIRATION RATE: 16 BRPM | OXYGEN SATURATION: 99 % | DIASTOLIC BLOOD PRESSURE: 83 MMHG | BODY MASS INDEX: 24.36 KG/M2 | WEIGHT: 151.6 LBS | TEMPERATURE: 97.4 F | HEIGHT: 66 IN

## 2018-04-18 DIAGNOSIS — I48.0 PAROXYSMAL ATRIAL FIBRILLATION (H): ICD-10-CM

## 2018-04-18 DIAGNOSIS — Z79.01 LONG-TERM (CURRENT) USE OF ANTICOAGULANTS: ICD-10-CM

## 2018-04-18 DIAGNOSIS — C90.00 MULTIPLE MYELOMA NOT HAVING ACHIEVED REMISSION (H): Primary | ICD-10-CM

## 2018-04-18 LAB
BASOPHILS # BLD AUTO: 0 10E9/L (ref 0–0.2)
BASOPHILS NFR BLD AUTO: 0.1 %
DIFFERENTIAL METHOD BLD: ABNORMAL
EOSINOPHIL # BLD AUTO: 0 10E9/L (ref 0–0.7)
EOSINOPHIL NFR BLD AUTO: 0.1 %
ERYTHROCYTE [DISTWIDTH] IN BLOOD BY AUTOMATED COUNT: 14.9 % (ref 10–15)
HCT VFR BLD AUTO: 36.4 % (ref 35–47)
HGB BLD-MCNC: 12.1 G/DL (ref 11.7–15.7)
IMM GRANULOCYTES # BLD: 0 10E9/L (ref 0–0.4)
IMM GRANULOCYTES NFR BLD: 0.4 %
INR PPP: 1.89 (ref 0.86–1.14)
LYMPHOCYTES # BLD AUTO: 0.4 10E9/L (ref 0.8–5.3)
LYMPHOCYTES NFR BLD AUTO: 5.5 %
MCH RBC QN AUTO: 32.9 PG (ref 26.5–33)
MCHC RBC AUTO-ENTMCNC: 33.2 G/DL (ref 31.5–36.5)
MCV RBC AUTO: 99 FL (ref 78–100)
MONOCYTES # BLD AUTO: 0.1 10E9/L (ref 0–1.3)
MONOCYTES NFR BLD AUTO: 1.7 %
NEUTROPHILS # BLD AUTO: 6.6 10E9/L (ref 1.6–8.3)
NEUTROPHILS NFR BLD AUTO: 92.2 %
NRBC # BLD AUTO: 0 10*3/UL
NRBC BLD AUTO-RTO: 0 /100
PLATELET # BLD AUTO: 125 10E9/L (ref 150–450)
RBC # BLD AUTO: 3.68 10E12/L (ref 3.8–5.2)
WBC # BLD AUTO: 7.1 10E9/L (ref 4–11)

## 2018-04-18 PROCEDURE — 85025 COMPLETE CBC W/AUTO DIFF WBC: CPT | Performed by: INTERNAL MEDICINE

## 2018-04-18 PROCEDURE — 99207 ZZC NO CHARGE NURSE ONLY: CPT | Performed by: INTERNAL MEDICINE

## 2018-04-18 PROCEDURE — 96401 CHEMO ANTI-NEOPL SQ/IM: CPT

## 2018-04-18 PROCEDURE — 25000128 H RX IP 250 OP 636: Performed by: INTERNAL MEDICINE

## 2018-04-18 PROCEDURE — 85610 PROTHROMBIN TIME: CPT | Performed by: INTERNAL MEDICINE

## 2018-04-18 RX ORDER — HEPARIN SODIUM (PORCINE) LOCK FLUSH IV SOLN 100 UNIT/ML 100 UNIT/ML
5 SOLUTION INTRAVENOUS EVERY 8 HOURS
Status: DISCONTINUED | OUTPATIENT
Start: 2018-04-18 | End: 2018-04-18 | Stop reason: HOSPADM

## 2018-04-18 RX ADMIN — SODIUM CHLORIDE, PRESERVATIVE FREE 5 ML: 5 INJECTION INTRAVENOUS at 11:07

## 2018-04-18 RX ADMIN — BORTEZOMIB 2.3 MG: 3.5 INJECTION, POWDER, LYOPHILIZED, FOR SOLUTION INTRAVENOUS; SUBCUTANEOUS at 11:07

## 2018-04-18 ASSESSMENT — PAIN SCALES - GENERAL: PAINLEVEL: EXTREME PAIN (8)

## 2018-04-18 NOTE — MR AVS SNAPSHOT
Amira IVY Arreola   4/18/2018   Anticoagulation Therapy Visit    Description:  85 year old female   Provider:  Addy Frias MD   Department:  [facility]           INR as of 4/18/2018     Today's INR 1.89!      Anticoagulation Summary as of 4/18/2018     INR goal 2.0-3.0   Today's INR 1.89!   Full instructions 4/18: 6 mg; 4/20: 4 mg; Otherwise 4 mg every day   Next INR check 4/25/2018    Indications   Long-term (current) use of anticoagulants [Z79.01] [Z79.01]  Atrial fibrillation (H) [I48.91] (Resolved) [I48.91]         April 2018 Details    Sun Mon Tue Wed Thu Fri Sat     1               2               3               4               5               6               7                 8               9               10               11               12               13               14                 15               16               17               18      6 mg   See details      19      4 mg         20      4 mg         21      4 mg           22      4 mg         23      4 mg         24      4 mg         25            26               27               28                 29               30                     Date Details   04/18 This INR check       Date of next INR:  4/25/2018         How to take your warfarin dose     To take:  4 mg Take 1 of the 4 mg tablets.    To take:  6 mg Take 1.5 of the 4 mg tablets.

## 2018-04-18 NOTE — MR AVS SNAPSHOT
After Visit Summary   4/18/2018    Amira Arreola    MRN: [id number]           Patient Information     Date Of Birth          7/17/1932        Visit Information        Provider Department      4/18/2018 9:30 AM  INFUSION CHAIR 8 [facility]        Today's Diagnoses     Multiple myeloma not having achieved remission (H)    -  1    Paroxysmal atrial fibrillation (H)           Follow-ups after your visit        Your next 10 appointments already scheduled     Apr 25, 2018  9:30 AM CDT   Level 2 with  INFUSION CHAIR 18   [facility] ([facility])    [facility]  [address]  [place] [address]   [phone]            Apr 25, 2018 10:00 AM CDT   Return Visit with Shayne Roberts MD   [facility] ([facility])    [facility]  [address]  [place] [address]   [phone]            Apr 30, 2018 10:15 AM CDT   Return Visit with Ramos Rivera MD   [facility] ([facility])    [address]  [place] [address] [phone] OPT 2              Who to contact     If you have questions or need follow up information about today's clinic visit or your schedule please contact [facility] directly at [phone].  Normal or non-critical lab and imaging results will be communicated to you by MyChart, letter or phone within 4 business days after the clinic has received the results. If you do not hear from us within 7 days, please contact the clinic through MyChart or phone. If you have a critical or abnormal lab result, we will notify you by phone as soon as possible.  Submit refill requests through Kenshoo or call your pharmacy and they will forward the refill request to us. Please allow 3 business days for your refill to be completed.     "      Additional Information About Your Visit        MyChart Information     Dattch lets you send messages to your doctor, view your test results, renew your prescriptions, schedule appointments and more. To sign up, go to [email or web address] . Click on \"Log in\" on the left side of the screen, which will take you to the Welcome page. Then click on \"Sign up Now\" on the right side of the page.     You will be asked to enter the access code listed below, as well as some personal information. Please follow the directions to create your username and password.     Your access code is: [email or web address]  Expires: 7/3/2018  3:28 PM     Your access code will  in 90 days. If you need help or a new code, please call your [place] clinic or [phone].        Care EveryWhere ID     This is your [facility] EveryWhere ID. This could be used by other organizations to access your [place] medical records  [id number]        Your Vitals Were     Pulse Temperature Respirations Height Pulse Oximetry BMI (Body Mass Index)    85 97.4  F (36.3  C) (Oral) 16 1.664 m (5' 5.51\") 99% 24.83 kg/m2       Blood Pressure from Last 3 Encounters:   18 146/84   18 118/70   18 123/72    Weight from Last 3 Encounters:   18 68.8 kg (151 lb 9.6 oz)   18 67.3 kg (148 lb 6.4 oz)   18 65.6 kg (144 lb 9.6 oz)              We Performed the Following     CBC with platelets differential     INR        Primary Care Provider Office Phone # Fax #    Addy Frias [phone] [address]  [place] [address]        Equal Access to Services     HARINI ZELAYA : Gissel Galloway, wajanetda maydaqalexis, qaybta kaalmakira gupta, hung sadler. So [facility] [phone].    ATENCIÓN: Si habla español, tiene a barlow disposición servicios gratuitos de asistencia lingüística. Kadie al [phone].    We comply with applicable federal civil rights laws and [place] " laws. We do not discriminate on the basis of race, color, national origin, age, disability, sex, sexual orientation, or gender identity.            Thank you!     Thank you for choosing [facility] AND [facility]  for your care. Our goal is always to provide you with excellent care. Hearing back from our patients is one way we can continue to improve our services. Please take a few minutes to complete the written survey that you may receive in the mail after your visit with us. Thank you!             Your Updated Medication List - Protect others around you: Learn how to safely use, store and throw away your medicines at www.disposemymeds.org.          This list is accurate as of 4/18/18 11:13 AM.  Always use your most recent med list.                   Brand Name Dispense Instructions for use Diagnosis    acyclovir 400 MG tablet    ZOVIRAX    60 tablet    Take 1 tablet (400 mg) by mouth 2 times daily Start 1 week prior to daratumumab and continue for 3 months after treatment is complete.    Multiple myeloma not having achieved remission (H)       CALCIUM CITRATE + PO      Take 2,000 mg by mouth daily 2 tabs        carboxymethylcellulose 0.5 % Soln ophthalmic solution    REFRESH PLUS     1 drop 4 times daily        CLARINEX PO      Take by mouth daily Taking claritin        COMPAZINE PO      Take 10 mg by mouth daily as needed        cycloSPORINE 0.05 % ophthalmic emulsion    RESTASIS     Place 1 drop into both eyes every 12 hours        DAILY MULTIVITAMIN PO      Take 1 tablet by mouth daily.    Routine general medical examination at a health care facility       dexamethasone 4 MG tablet    DECADRON    28 tablet    Take 20mg (5 tablets) by mouth every week on the morning of velcade injection.    Multiple myeloma not having achieved remission (H)       erythromycin ophthalmic ointment    ROMYCIN     Place 1 Application into both eyes At Bedtime        lidocaine-prilocaine cream    EMLA    30 g     Apply topically as needed for moderate pain Apply dollop size amount to port site 30-60 min prior to accessing    Multiple myeloma not having achieved remission (H)       LORazepam 0.5 MG tablet    ATIVAN    30 tablet    Take 1 tablet (0.5 mg) by mouth every 8 hours as needed for anxiety    Multiple myeloma not having achieved remission (H)       metoprolol succinate 50 MG 24 hr tablet    TOPROL-XL    270 tablet    TAKE 2 TABLETS BY MOUTH EVERY MORNING, AND 1 TABLET EVERY EVENING    Paroxysmal atrial fibrillation (H)       oxyCODONE IR 15 MG tablet    ROXICODONE    60 tablet    Take 1 tablet (15 mg) by mouth every 8 hours as needed for pain maximum 4 tablet(s) per day    Multiple myeloma not having achieved remission (H)       polyethylene glycol powder    MIRALAX/GLYCOLAX     Take 1 capful by mouth daily as needed    Bilateral leg edema       SIMBRINZA 1-0.2 % ophthalmic suspension   Generic drug:  brinzolamide-brimonidine      Place 1 drop into the right eye 2 times daily 1 drop AM and PM        triamcinolone 0.5 % cream    KENALOG    15 g    Apply sparingly to affected area three times daily. 1-2 weeks , as needed    Rash and nonspecific skin eruption       TYLENOL PO      Take 500 mg by mouth every 6 hours as needed for mild pain or fever        UNABLE TO FIND      MEDICATION NAME: Fresh Coat eye drops        VITAMIN D3 PO      Take 1,000 Units by mouth daily        warfarin 4 MG tablet    COUMADIN    110 tablet    Take 1-2 tablets daily as directed by INR clinic.    Paroxysmal atrial fibrillation (H), Long-term (current) use of anticoagulants       ZOMETA IV      Inject into the vein every 30 days Every 3 month dosing

## 2018-04-18 NOTE — PROGRESS NOTES
Infusion Nursing Note:  Amira Arreola presents today for C12D8 Velcade.    Patient seen by provider today: No   present during visit today: Not Applicable.    Note: patient is c/o back pain and stating she has needed to increase the amount of pain pills she's been taking. Patient stated she has enough pain medication to last until her next appointment with the doctor. Patient was given a refill for dexamethasone, and said she has enough of the acyclovir.    Intravenous Access:  Labs drawn without difficulty.  Implanted Port.    Treatment Conditions:  Lab Results   Component Value Date    HGB 12.1 04/18/2018     Lab Results   Component Value Date    WBC 7.1 04/18/2018      Lab Results   Component Value Date    ANEU 6.6 04/18/2018     Lab Results   Component Value Date     04/18/2018               Results reviewed, labs MET treatment parameters, ok to proceed with treatment.      Post Infusion Assessment:  Patient tolerated injection without incident.  Site patent and intact, free from redness, edema or discomfort.  No evidence of extravasations.    Discharge Plan:   Prescription refills given for dexamethasone.  Discharge instructions reviewed with: Patient.  Patient and/or family verbalized understanding of discharge instructions and all questions answered.  Copy of AVS reviewed with patient and/or family.  Patient will return 4/25/18 for next appointment.  Patient discharged in stable condition accompanied by: self.  Departure Mode: Ambulatory.    Ladonna Boo RN

## 2018-04-18 NOTE — PROGRESS NOTES
ANTICOAGULATION FOLLOW-UP CLINIC VISIT    Patient Name:  Amira Arreola  Date:  4/18/2018  Contact Type:  Telephone    SUBJECTIVE:     Patient Findings     Positives No Problem Findings    Comments Had to leave detailed VM for patient  Asked that she call us back if questions/concerns/changes/missed doses/etc           OBJECTIVE    INR   Date Value Ref Range Status   04/18/2018 1.89 (H) 0.86 - 1.14 Final       ASSESSMENT / PLAN  INR assessment SUB    Recheck INR In: 1 WEEK    INR Location Outside lab      Anticoagulation Summary as of 4/18/2018     INR goal 2.0-3.0   Today's INR 1.89!   Maintenance plan 4 mg (4 mg x 1) every day   Full instructions 4/18: 6 mg; 4/20: 4 mg; Otherwise 4 mg every day   Weekly total 28 mg   Plan last modified Tigist Corral RN (4/4/2018)   Next INR check 4/25/2018   Target end date Indefinite    Indications   Long-term (current) use of anticoagulants [Z79.01] [Z79.01]  Atrial fibrillation (H) [I48.91] (Resolved) [I48.91]         Anticoagulation Episode Summary     INR check location     Preferred lab     Send INR reminders to CS ANTICOAGULATION    Comments patient have standing INR order to be draw at infusion visit.  advise to call [facility] when have INR draw for dosing instruction.  patient can be reach at home phone [phone]      Anticoagulation Care Providers     Provider Role Specialty Phone number    Addy Frias MD [facility] Internal Medicine [phone]            See the Encounter Report to view Anticoagulation Flowsheet and Dosing Calendar (Go to Encounters tab in chart review, and find the Anticoagulation Therapy Visit)    Dosage adjustment made based on physician directed care plan.  See above - had to leave detailed VM for patient  INR sub today  Unknown if missed dose, med change, etc  Asked that pt callback if anything going on or any changes  Otherwise dose increase tonight of 6mg then back to 4mg daily - recheck again 1 week    Michelle Pinon RN

## 2018-04-25 ENCOUNTER — INFUSION THERAPY VISIT (OUTPATIENT)
Dept: INFUSION THERAPY | Facility: CLINIC | Age: 83
End: 2018-04-25
Attending: INTERNAL MEDICINE
Payer: MEDICARE

## 2018-04-25 ENCOUNTER — HOSPITAL ENCOUNTER (OUTPATIENT)
Facility: CLINIC | Age: 83
Setting detail: SPECIMEN
Discharge: HOME | End: 2018-04-25
Attending: INTERNAL MEDICINE | Admitting: INTERNAL MEDICINE
Payer: MEDICARE

## 2018-04-25 ENCOUNTER — ONCOLOGY VISIT (OUTPATIENT)
Dept: ONCOLOGY | Facility: CLINIC | Age: 83
End: 2018-04-25
Attending: INTERNAL MEDICINE
Payer: MEDICARE

## 2018-04-25 VITALS
WEIGHT: 151.2 LBS | DIASTOLIC BLOOD PRESSURE: 76 MMHG | RESPIRATION RATE: 16 BRPM | SYSTOLIC BLOOD PRESSURE: 133 MMHG | HEART RATE: 77 BPM | BODY MASS INDEX: 24.3 KG/M2 | TEMPERATURE: 98.1 F | HEIGHT: 66 IN

## 2018-04-25 VITALS
TEMPERATURE: 98.1 F | BODY MASS INDEX: 24.77 KG/M2 | DIASTOLIC BLOOD PRESSURE: 76 MMHG | WEIGHT: 151.2 LBS | HEART RATE: 77 BPM | SYSTOLIC BLOOD PRESSURE: 133 MMHG | RESPIRATION RATE: 16 BRPM

## 2018-04-25 DIAGNOSIS — I48.0 PAROXYSMAL ATRIAL FIBRILLATION (H): ICD-10-CM

## 2018-04-25 DIAGNOSIS — C90.00 MULTIPLE MYELOMA NOT HAVING ACHIEVED REMISSION (H): Primary | ICD-10-CM

## 2018-04-25 DIAGNOSIS — C79.51 CANCER, METASTATIC TO BONE (H): ICD-10-CM

## 2018-04-25 LAB
ALBUMIN SERPL-MCNC: 3.2 G/DL (ref 3.4–5)
BASOPHILS # BLD AUTO: 0 10E9/L (ref 0–0.2)
BASOPHILS NFR BLD AUTO: 0.1 %
CALCIUM SERPL-MCNC: 8.8 MG/DL (ref 8.5–10.1)
CREAT SERPL-MCNC: 0.66 MG/DL (ref 0.52–1.04)
DIFFERENTIAL METHOD BLD: ABNORMAL
EOSINOPHIL # BLD AUTO: 0 10E9/L (ref 0–0.7)
EOSINOPHIL NFR BLD AUTO: 0.3 %
ERYTHROCYTE [DISTWIDTH] IN BLOOD BY AUTOMATED COUNT: 14.9 % (ref 10–15)
GFR SERPL CREATININE-BSD FRML MDRD: 85 ML/MIN/1.7M2
HCT VFR BLD AUTO: 36.4 % (ref 35–47)
HGB BLD-MCNC: 12 G/DL (ref 11.7–15.7)
IMM GRANULOCYTES # BLD: 0 10E9/L (ref 0–0.4)
IMM GRANULOCYTES NFR BLD: 0.3 %
INR PPP: 1.89 (ref 0.86–1.14)
LYMPHOCYTES # BLD AUTO: 0.3 10E9/L (ref 0.8–5.3)
LYMPHOCYTES NFR BLD AUTO: 3.7 %
MCH RBC QN AUTO: 33 PG (ref 26.5–33)
MCHC RBC AUTO-ENTMCNC: 33 G/DL (ref 31.5–36.5)
MCV RBC AUTO: 100 FL (ref 78–100)
MONOCYTES # BLD AUTO: 0.1 10E9/L (ref 0–1.3)
MONOCYTES NFR BLD AUTO: 1.8 %
NEUTROPHILS # BLD AUTO: 7.4 10E9/L (ref 1.6–8.3)
NEUTROPHILS NFR BLD AUTO: 93.8 %
PLATELET # BLD AUTO: 129 10E9/L (ref 150–450)
RBC # BLD AUTO: 3.64 10E12/L (ref 3.8–5.2)
WBC # BLD AUTO: 7.9 10E9/L (ref 4–11)

## 2018-04-25 PROCEDURE — G0463 HOSPITAL OUTPT CLINIC VISIT: HCPCS

## 2018-04-25 PROCEDURE — 82310 ASSAY OF CALCIUM: CPT | Performed by: INTERNAL MEDICINE

## 2018-04-25 PROCEDURE — 82040 ASSAY OF SERUM ALBUMIN: CPT | Performed by: INTERNAL MEDICINE

## 2018-04-25 PROCEDURE — 82565 ASSAY OF CREATININE: CPT | Performed by: INTERNAL MEDICINE

## 2018-04-25 PROCEDURE — 96401 CHEMO ANTI-NEOPL SQ/IM: CPT

## 2018-04-25 PROCEDURE — 99214 OFFICE O/P EST MOD 30 MIN: CPT | Performed by: INTERNAL MEDICINE

## 2018-04-25 PROCEDURE — 85025 COMPLETE CBC W/AUTO DIFF WBC: CPT | Performed by: INTERNAL MEDICINE

## 2018-04-25 PROCEDURE — 85610 PROTHROMBIN TIME: CPT | Performed by: INTERNAL MEDICINE

## 2018-04-25 PROCEDURE — 25000128 H RX IP 250 OP 636: Performed by: INTERNAL MEDICINE

## 2018-04-25 RX ORDER — OXYCODONE HYDROCHLORIDE 15 MG/1
15 TABLET ORAL EVERY 8 HOURS PRN
Qty: 60 TABLET | Refills: 0 | Status: SHIPPED | OUTPATIENT
Start: 2018-04-25 | End: 2018-05-09

## 2018-04-25 RX ORDER — HEPARIN SODIUM (PORCINE) LOCK FLUSH IV SOLN 100 UNIT/ML 100 UNIT/ML
5 SOLUTION INTRAVENOUS EVERY 8 HOURS
Status: DISCONTINUED | OUTPATIENT
Start: 2018-04-25 | End: 2018-04-25 | Stop reason: HOSPADM

## 2018-04-25 RX ADMIN — HEPARIN 5 ML: 100 SYRINGE at 11:13

## 2018-04-25 RX ADMIN — BORTEZOMIB 2.3 MG: 3.5 INJECTION, POWDER, LYOPHILIZED, FOR SOLUTION INTRAVENOUS; SUBCUTANEOUS at 11:13

## 2018-04-25 ASSESSMENT — PAIN SCALES - GENERAL
PAINLEVEL: NO PAIN (0)
PAINLEVEL: NO PAIN (0)

## 2018-04-25 NOTE — PROGRESS NOTES
Infusion Nursing Note:  Amira Arreola presents today for Velcade.    Patient seen by provider today: Yes: exam with Dr. Roberts today   present during visit today: Not Applicable.    Note: pt states she is concerned about increasing numbness in ring finger and little fingers in both hands, and wants to talk to Dr. Roberts about this. .  Following exam pt states that Dr. Roberts aware of symptoms in her hands and they have agreed to monitor for now, but no changes in meds  Intravenous Access:  Implanted Port.    Treatment Conditions:  Lab Results   Component Value Date    HGB 12.0 04/25/2018     Lab Results   Component Value Date    WBC 7.9 04/25/2018      Lab Results   Component Value Date    ANEU 7.4 04/25/2018     Lab Results   Component Value Date     04/25/2018      Lab Results   Component Value Date     06/13/2017                   Lab Results   Component Value Date    POTASSIUM 4.0 06/13/2017           No results found for: MAG         Lab Results   Component Value Date    CR 0.66 04/25/2018                   Lab Results   Component Value Date    BRADLEY 8.8 04/25/2018                Lab Results   Component Value Date    BILITOTAL 0.5 04/11/2018           Lab Results   Component Value Date    ALBUMIN 3.2 04/25/2018                    Lab Results   Component Value Date    ALT 25 04/11/2018           Lab Results   Component Value Date    AST 18 04/11/2018       Results reviewed, labs MET treatment parameters, ok to proceed with treatment.      Post Infusion Assessment:  Patient tolerated injection without incident.  Site patent and intact, free from redness, edema or discomfort.  No evidence of extravasations.  Access discontinued per protocol.    Discharge Plan:   Discharge instructions reviewed with: Patient.  Patient and/or family verbalized understanding of discharge instructions and all questions answered.  Copy of AVS reviewed with patient and/or family.  Patient will return next week for  next appointment.  Patient discharged in stable condition accompanied by: self.  Departure Mode: Ambulatory.    Gaby Stewart RN

## 2018-04-25 NOTE — LETTER
"    4/25/2018         RE: Amira Arreola  [address]  [place] [address]        Dear Colleague,    Thank you for referring your patient, Amira Arreola, to the [facility]. Please see a copy of my visit note below.    Oncology Rooming Note    April 25, 2018 10:25 AM   Amira Arreola is a 85 year old female who presents for:    Chief Complaint   Patient presents with     Oncology Clinic Visit     Multiple myeloma not having achieved remission     Initial Vitals: /76  Pulse 77  Temp 98.1  F (36.7  C) (Oral)  Resp 16  Wt 68.6 kg (151 lb 3.2 oz)  BMI 24.77 kg/m2 Estimated body mass index is 24.77 kg/(m^2) as calculated from the following:    Height as of 4/18/18: 1.664 m (5' 5.51\").    Weight as of this encounter: 68.6 kg (151 lb 3.2 oz). Body surface area is 1.78 meters squared.  No Pain (0) Comment: Data Unavailable   No LMP recorded. Patient is postmenopausal.  Allergies reviewed: Yes  Medications reviewed: Yes    Medications: MEDICATION REFILL NEEDED ON OXYCODONE 15 MG AND ATIVAN  Pharmacy name entered into [employer]: [facility], [place] - [address] [facility] [address]    Clinical concerns: None                             4 minutes for nursing intake (face to face time)     Zora Bentley MA              Visit Date:   04/25/2018      SUBJECTIVE:  Mrs. Arreola is an 85-year-old female with kappa free light chain multiple myeloma.  She is currently on Velcade, daratumumab and dexamethasone since May of 2017.  Her disease has been responding.  The patient previously had progressed on Revlimid with dexamethasone and Velcade with dexamethasone.      Overall, she is doing well for her age.  She has some fatigue.  She still lives in her house with her .      After her last chemotherapy, she noticed some numbness and tingling in the tips of the fingers.  Not in the toes.  It lasted for about 5 days then resolved.  This is the first time she " has complained of neuropathy.      The patient has chronic back pain.  It is mainly in the mid back and also in the right side.  She is on oxycodone.  It helps with the pain.  She wants a refill.      REVIEW OF SYSTEMS:  Denies any headache or dizziness.  No chest pain or difficulty breathing.  No nausea or vomiting.  Appetite fairly good.  No urinary complaints.  Sometimes has constipation.  No bleeding.  No fever, chills or night sweats.       PHYSICAL EXAMINATION:  Unchanged.      LABORATORY DATA:  Reviewed.      ASSESSMENT:     1.  An 85-year-old female with kappa free light chain multiple myeloma.  Myeloma is stable.   2.  Chronic back pain.   3.  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   4.  Fatigue.      PLAN:   1.  Labs were reviewed with the patient.  I explained to her that overall her myeloma is stable.  On 2018, kappa free light chain was 2.0.     a.  She is on Velcade, daratumumab and dexamethasone.  We will continue her on it.  The patient has peripheral neuropathy.  I told her it is secondary to Velcade.  If it gets worse, we will either plan on dose reduction of Velcade or replacing Velcade with pomalidomide.  Patient agreeable for this plan.  I will see her in 2 weeks' time to see how her neuropathy is doing.   2.  She has chronic back pain.  Prescription of oxycodone refilled.   3.  The patient is on Zometa every 3 months, which will be continued.  She is not having any jaw or dental-related symptoms.   4.  She had a few questions, which were all answered.  I will see her in 2 weeks' time.  She will call if she has any problems in between.  She is on anticoagulation for atrial fibrillation.  INR is 1.89.  She will call her anticoagulation clinic for warfarin dosing.         ITZ LOPEZ MD             D: 2018   T: 2018   MT: NORBERT      Name:     ALBERTO PARSON   MRN:      [id number]        Account:      [id number]   :      1932           Visit Date:   2018       Document: [id number]       Again, thank you for allowing me to participate in the care of your patient.        Sincerely,        Shayne Roberts MD

## 2018-04-25 NOTE — MR AVS SNAPSHOT
After Visit Summary   4/25/2018    Amira Arreola    MRN: [id number]           Patient Information     Date Of Birth          7/17/1932        Visit Information        Provider Department      4/25/2018 10:00 AM Shayne Roberts MD [facility]        Today's Diagnoses     Multiple myeloma not having achieved remission (H)    -  1      Care Instructions    Continue chemotherapy.  Follow up in 2 weeks.  Inform anti-coagulation clinic of INR.          Follow-ups after your visit        Your next 10 appointments already scheduled     Apr 30, 2018 10:15 AM CDT   Return Visit with Ramos Rivera MD   [facility] ([facility])    [address]  [place] [address]   [phone] OPT 2            May 02, 2018 10:00 AM CDT   Level 2 with SH INFUSION CHAIR 15   [facility] ([facility])    [facility]  [address]  [facility] [address]   [phone]            May 09, 2018  8:30 AM CDT   Level 2 with SH INFUSION CHAIR 9   [facility] ([facility])    [facility] [place]  [address]  [place] [address]   [phone]            May 09, 2018  9:20 AM CDT   Return Visit with Shayne Roberts MD   [facility] ([facility])    [facility]  [address]  [facility] [address]   [phone]              Who to contact     If you have questions or need follow up information about today's clinic visit or your schedule please contact [facility] directly at [phone].  Normal or non-critical lab and imaging results will be communicated to you by MyChart, letter or phone within 4 business days after the clinic has received the results. If you do not hear from us within 7 days, please contact the clinic through [employer] or  "phone. If you have a critical or abnormal lab result, we will notify you by phone as soon as possible.  Submit refill requests through [employer] or call your pharmacy and they will forward the refill request to us. Please allow 3 business days for your refill to be completed.          Additional Information About Your Visit        [employer] Information     [employer] lets you send messages to your doctor, view your test results, renew your prescriptions, schedule appointments and more. To sign up, go to [email or web address] . Click on \"Log in\" on the left side of the screen, which will take you to the Welcome page. Then click on \"Sign up Now\" on the right side of the page.     You will be asked to enter the access code listed below, as well as some personal information. Please follow the directions to create your username and password.     Your access code is: [email or web address]  Expires: 7/3/2018  3:28 PM     Your access code will  in 90 days. If you need help or a new code, please call your [place] clinic or [phone].        Care EveryWhere ID     This is your Care EveryWhere ID. This could be used by other organizations to access your [facility] medical records  [id number]        Your Vitals Were     Pulse Temperature Respirations BMI (Body Mass Index)          77 98.1  F (36.7  C) (Oral) 16 24.77 kg/m2         Blood Pressure from Last 3 Encounters:   18 133/76   18 133/76   18 132/83    Weight from Last 3 Encounters:   18 68.6 kg (151 lb 3.2 oz)   18 68.6 kg (151 lb 3.2 oz)   18 68.8 kg (151 lb 9.6 oz)              Today, you had the following     No orders found for display         Where to get your medicines      Some of these will need a paper prescription and others can be bought over the counter.  Ask your nurse if you have questions.     Bring a paper prescription for each of these medications     oxyCODONE IR 15 MG tablet         Information about OPIOIDS     " PRESCRIPTION OPIOIDS: WHAT YOU NEED TO KNOW   You have a prescription for an opioid (narcotic) pain medicine. Opioids can cause addiction. If you have a history of chemical dependency of any type, you are at a higher risk of becoming addicted to opioids. Only take this medicine after all other options have been tried. Take it for as short a time and as few doses as possible.     Do not:    Drive. If you drive while taking these medicines, you could be arrested for driving under the influence (DUI).    Operate heavy machinery    Do any other dangerous activities while taking these medicines.     Drink any alcohol while taking these medicines.      Take with any other medicines that contain acetaminophen. Read all labels carefully. Look for the word  acetaminophen  or  Tylenol.  Ask your pharmacist if you have questions or are unsure.    Store your pills in a secure place, locked if possible. We will not replace any lost or stolen medicine. If you don t finish your medicine, please throw away (dispose) as directed by your pharmacist. The [place] Pollution Control Agency has more information about safe disposal: https://www.pca.[facility].[place]/living-green/managing-unwanted-medications    All opioids tend to cause constipation. Drink plenty of water and eat foods that have a lot of fiber, such as fruits, vegetables, prune juice, apple juice and high-fiber cereal. Take a laxative (Miralax, milk of magnesia, Colace, Senna) if you don t move your bowels at least every other day.          Primary Care Provider Office Phone # Fax #    Addy Firas [phone] [address] [address]        Equal Access to Services     [facility] : Hadii liane gandara Soyair, waaxda luqadaha, qaybta kaalmada hung gupta. So [facility] [phone].    ATENCIÓN: Si habla español, tiene a barlow disposición servicios gratuitos de asistencia lingüística. Llame al  [phone].    We comply with applicable federal civil rights laws and [place] laws. We do not discriminate on the basis of race, color, national origin, age, disability, sex, sexual orientation, or gender identity.            Thank you!     Thank you for choosing [facility]  for your care. Our goal is always to provide you with excellent care. Hearing back from our patients is one way we can continue to improve our services. Please take a few minutes to complete the written survey that you may receive in the mail after your visit with us. Thank you!             Your Updated Medication List - Protect others around you: Learn how to safely use, store and throw away your medicines at www.disposemymeds.org.          This list is accurate as of 4/25/18 11:15 AM.  Always use your most recent med list.                   Brand Name Dispense Instructions for use Diagnosis    acyclovir 400 MG tablet    ZOVIRAX    60 tablet    Take 1 tablet (400 mg) by mouth 2 times daily Start 1 week prior to daratumumab and continue for 3 months after treatment is complete.    Multiple myeloma not having achieved remission (H)       CALCIUM CITRATE + PO      Take 2,000 mg by mouth daily 2 tabs        carboxymethylcellulose 0.5 % Soln ophthalmic solution    REFRESH PLUS     1 drop 4 times daily        CLARINEX PO      Take by mouth daily Taking claritin        COMPAZINE PO      Take 10 mg by mouth daily as needed        cycloSPORINE 0.05 % ophthalmic emulsion    RESTASIS     Place 1 drop into both eyes every 12 hours        DAILY MULTIVITAMIN PO      Take 1 tablet by mouth daily.    Routine general medical examination at a health care facility       dexamethasone 4 MG tablet    DECADRON    28 tablet    Take 20mg (5 tablets) by mouth every week on the morning of velcade injection.    Multiple myeloma not having achieved remission (H)       erythromycin ophthalmic ointment    ROMYCIN     Place 1 Application into both eyes At  Bedtime        lidocaine-prilocaine cream    EMLA    30 g    Apply topically as needed for moderate pain Apply dollop size amount to port site 30-60 min prior to accessing    Multiple myeloma not having achieved remission (H)       LORazepam 0.5 MG tablet    ATIVAN    30 tablet    Take 1 tablet (0.5 mg) by mouth every 8 hours as needed for anxiety    Multiple myeloma not having achieved remission (H)       metoprolol succinate 50 MG 24 hr tablet    TOPROL-XL    270 tablet    TAKE 2 TABLETS BY MOUTH EVERY MORNING, AND 1 TABLET EVERY EVENING    Paroxysmal atrial fibrillation (H)       oxyCODONE IR 15 MG tablet    ROXICODONE    60 tablet    Take 1 tablet (15 mg) by mouth every 8 hours as needed for pain maximum 4 tablet(s) per day    Multiple myeloma not having achieved remission (H)       polyethylene glycol powder    MIRALAX/GLYCOLAX     Take 1 capful by mouth daily as needed    Bilateral leg edema       SIMBRINZA 1-0.2 % ophthalmic suspension   Generic drug:  brinzolamide-brimonidine      Place 1 drop into the right eye 2 times daily 1 drop AM and PM        triamcinolone 0.5 % cream    KENALOG    15 g    Apply sparingly to affected area three times daily. 1-2 weeks , as needed    Rash and nonspecific skin eruption       TYLENOL PO      Take 500 mg by mouth every 6 hours as needed for mild pain or fever        UNABLE TO FIND      MEDICATION NAME: Fresh Coat eye drops        VITAMIN D3 PO      Take 1,000 Units by mouth daily        warfarin 4 MG tablet    COUMADIN    110 tablet    Take 1-2 tablets daily as directed by INR clinic.    Paroxysmal atrial fibrillation (H), Long-term (current) use of anticoagulants       ZOMETA IV      Inject into the vein every 30 days Every 3 month dosing

## 2018-04-25 NOTE — PROGRESS NOTES
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     1.  She had work-up for thrombocytopenia.       -On 09/21/2015, WBC of 4.2, hemoglobin of 13.2 and platelets of 138.    -On 09/29/2015, SPEP does not reveal any M-spike.   -On 10/02/2015, JANET does not reveal any monoclonal protein.     -On 10/22/2015, urine immunofixation reveals monoclonal free kappa light chain.    2. On 05/11/2016, kappa light chain of 50, lambda light chain of 0.32 and ratio of kappa to lambda of 156.2.  3. Skeletal survey on 05/23/2016 does not reveal any lytic lesion.    4. Bone marrow biopsy on 05/25/2016 reveals 40-50% kappa light chain restricted plasma cells.  Cytogenetics is normal. 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    5. MRI of bones on 06/21/2016 and 06/22/2016 reveals myeloma lesions.  6. On 08/24/2016, she was started on revlimid 25 mg 3 weeks on and 1 week off along with dexamethasone 20 mg weekly. Due to cytopenia, dose was subsequently reduced to 15 mg a day. Treatment in between had to be delayed because of cytopenia. She did not have any significant response to treatment.   7. Velcade and dexamethasone started on 03/21/2017.    8. On 03/21/2017, kappa free light chain was 52.5.  It decreased to 41.75 on 04/18/2017.  It  increased to 60.75 on 05/16/2017.    9. Daratumumab added on 05/31/2017.   10.  On 03/14/2018, kappa free light chain is down to 1.91.     SUBJECTIVE:    Mrs. Arreola is an 85-year-old female with kappa free light chain multiple myeloma.  She is currently on Velcade, daratumumab and dexamethasone since May of 2017.  Her disease has been responding. Patient previously had progressed on Revlimid with dexamethasone and Velcade with dexamethasone.      Overall, she is doing well for her age.  She has some fatigue.  She  still lives in her house with her .      After her last chemotherapy, she had some numbness and tingling in the tips of the fingers.  Not in the toes.  It lasted for about 5 days then resolved.  This is the first time she has complained of neuropathy.      Patient has chronic back pain.  It is mainly in the mid back and also in the right side.  She is on oxycodone.  It helps with the pain.  She wants a refill.      REVIEW OF SYSTEMS:    Denies any headache or dizziness.  No chest pain or difficulty breathing.  No nausea or vomiting.  Appetite is fairly good.  No urinary complaints.  Sometimes has constipation.  No bleeding.  No fever, chills or night sweats.       PHYSICAL EXAMINATION:   Alert and oriented x 3.   EYES:  No icterus.   THROAT:  No ulcer or thrush.   NECK:  Supple. No lymphadenopathy.   AXILLAE:  No lymphadenopathy.   LUNGS:  Good air entry bilaterally.  No crackles or wheezing.   HEART:  Regular.  No murmur.   ABDOMEN:  Soft and nontender.  No mass.   EXTREMITIES: Bilateral pedal edema. No calf swelling or tenderness.   SKIN:  There are a few ecchymoses.  No petechia. Patient is on warfarin.     LABORATORY DATA:  Reviewed.      ASSESSMENT:     1.  An 85-year-old female with kappa free light chain multiple myeloma.  Myeloma is stable.   2.  Chronic back pain.   3.  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   4.  Fatigue.      PLAN:   1.  Labs were reviewed with the patient.  I explained to her that overall her myeloma is stable.  On 04/11/2018, kappa free light chain was 2.0.     She is on Velcade, daratumumab and dexamethasone.  We will continue her on it. Patient has peripheral neuropathy.  I told her it is secondary to Velcade.  If it gets worse, we will either plan on dose reduction of Velcade or replacing Velcade with pomalidomide.  Patient agreeable for this plan.  I will see her in 2 weeks' time to see how her neuropathy is doing.     2.  She has chronic back pain.  Prescription of oxycodone  refilled.     3.  Patient is on Zometa every 3 months, which will be continued.  She is not having any jaw or dental-related symptoms.     4.  She had a few questions, which were all answered.  I will see her in 2 weeks' time.  She will call if she has any problems in between.  She is on anticoagulation for atrial fibrillation.  INR is 1.89.  She will call her anticoagulation clinic for warfarin dosing.         ITZ LOPEZ MD             D: 2018   T: 2018   MT: NORBERT      Name:     ALBERTO PARSON   MRN:      [id number]        Account:      [id number]   :      1932           Visit Date:   2018      Document: [id number]

## 2018-04-25 NOTE — PATIENT INSTRUCTIONS
Continue chemotherapy.  Scheduled/aida  Follow up in 2 weeks.scheduled/aida  Inform anti-coagulation clinic of INR.      AVS printed & given to patient/aida

## 2018-04-25 NOTE — PROGRESS NOTES
"Oncology Rooming Note    April 25, 2018 10:25 AM   Amira Arreola is a 85 year old female who presents for:    Chief Complaint   Patient presents with     Oncology Clinic Visit     Multiple myeloma not having achieved remission     Initial Vitals: /76  Pulse 77  Temp 98.1  F (36.7  C) (Oral)  Resp 16  Wt 68.6 kg (151 lb 3.2 oz)  BMI 24.77 kg/m2 Estimated body mass index is 24.77 kg/(m^2) as calculated from the following:    Height as of 4/18/18: 1.664 m (5' 5.51\").    Weight as of this encounter: 68.6 kg (151 lb 3.2 oz). Body surface area is 1.78 meters squared.  No Pain (0) Comment: Data Unavailable   No LMP recorded. Patient is postmenopausal.  Allergies reviewed: Yes  Medications reviewed: Yes    Medications: MEDICATION REFILL NEEDED ON OXYCODONE 15 MG AND ATIVAN  Pharmacy name entered into [facility]: [facility] [address] [place] [address] AT [facility] [address]    Clinical concerns: None                             4 minutes for nursing intake (face to face time)     Zora Bentley MA            "

## 2018-04-25 NOTE — MR AVS SNAPSHOT
After Visit Summary   4/25/2018    Amira Arreola    MRN: [id number]           Patient Information     Date Of Birth          7/17/1932        Visit Information        Provider Department      4/25/2018 9:30 AM  INFUSION CHAIR 18 [facility]        Today's Diagnoses     Multiple myeloma not having achieved remission (H)    -  1    Cancer, metastatic to bone (H)        Paroxysmal atrial fibrillation (H)           Follow-ups after your visit        Your next 10 appointments already scheduled     Apr 30, 2018 10:15 AM CDT   Return Visit with Ramos Rivera MD   [facility] ([facility])    [address]  [place] [address]   [phone] OPT 2            May 02, 2018 10:00 AM CDT   Level 2 with  INFUSION CHAIR 15   [facility] ([facility])    [facility] [place]  [address]  [place] [address]   [phone]            May 09, 2018  8:30 AM CDT   Level 2 with  INFUSION CHAIR 9   [facility] ([facility])    [facility] [place]  [address]  [place] [address]   [phone]            May 09, 2018  9:20 AM CDT   Return Visit with Shayne Roberts MD   [facility] ([facility])    [facility] [place]  [address]  [facility] [address]   [phone]              Who to contact     If you have questions or need follow up information about today's clinic visit or your schedule please contact [facility] directly at [phone].  Normal or non-critical lab and imaging results will be communicated to you by MyChart, letter or phone within 4 business days after the clinic has received the results. If you do not hear from us within 7 days, please contact the clinic through  "Triagehart or phone. If you have a critical or abnormal lab result, we will notify you by phone as soon as possible.  Submit refill requests through InteKrin or call your pharmacy and they will forward the refill request to us. Please allow 3 business days for your refill to be completed.          Additional Information About Your Visit        Triagehart Information     InteKrin lets you send messages to your doctor, view your test results, renew your prescriptions, schedule appointments and more. To sign up, go to [email or web address] . Click on \"Log in\" on the left side of the screen, which will take you to the Welcome page. Then click on \"Sign up Now\" on the right side of the page.     You will be asked to enter the access code listed below, as well as some personal information. Please follow the directions to create your username and password.     Your access code is: [email or web address]  Expires: 7/3/2018  3:28 PM     Your access code will  in 90 days. If you need help or a new code, please call your [facility] clinic or [phone].        Care EveryWhere ID     This is your Care EveryWhere ID. This could be used by other organizations to access your [facility] medical records  [id number]        Your Vitals Were     Pulse Temperature Respirations Height BMI (Body Mass Index)       77 98.1  F (36.7  C) (Oral) 16 1.664 m (5' 5.51\") 24.77 kg/m2        Blood Pressure from Last 3 Encounters:   18 133/76   18 133/76   18 132/83    Weight from Last 3 Encounters:   18 68.6 kg (151 lb 3.2 oz)   18 68.6 kg (151 lb 3.2 oz)   18 68.8 kg (151 lb 9.6 oz)              We Performed the Following     Albumin level     Calcium     CBC with platelets differential     Creatinine     INR          Where to get your medicines      Some of these will need a paper prescription and others can be bought over the counter.  Ask your nurse if you have questions.     Bring a paper prescription for each of these " medications     oxyCODONE IR 15 MG tablet          Primary Care Provider Office Phone # Fax #    Addy Sean Frias [phone] [address] [address]        Equal Access to Services     MICHAELHARINI GARCIA : Hadii aad ku haddoriso Soomaali, waaxda luqadaha, qaybta kaalmada adeegyada, hung khann blanca diego laterimorteza . So [facility] [phone].    ATENCIÓN: Si habla español, tiene a barlow disposición servicios gratuitos de asistencia lingüística. Llame al [phone].    We comply with applicable federal civil rights laws and [place] laws. We do not discriminate on the basis of race, color, national origin, age, disability, sex, sexual orientation, or gender identity.            Thank you!     Thank you for choosing [facility]  for your care. Our goal is always to provide you with excellent care. Hearing back from our patients is one way we can continue to improve our services. Please take a few minutes to complete the written survey that you may receive in the mail after your visit with us. Thank you!             Your Updated Medication List - Protect others around you: Learn how to safely use, store and throw away your medicines at www.disposemymeds.org.          This list is accurate as of 4/25/18 11:48 AM.  Always use your most recent med list.                   Brand Name Dispense Instructions for use Diagnosis    acyclovir 400 MG tablet    ZOVIRAX    60 tablet    Take 1 tablet (400 mg) by mouth 2 times daily Start 1 week prior to daratumumab and continue for 3 months after treatment is complete.    Multiple myeloma not having achieved remission (H)       CALCIUM CITRATE + PO      Take 2,000 mg by mouth daily 2 tabs        carboxymethylcellulose 0.5 % Soln ophthalmic solution    REFRESH PLUS     1 drop 4 times daily        CLARINEX PO      Take by mouth daily Taking claritin        COMPAZINE PO      Take 10 mg by mouth daily as needed         cycloSPORINE 0.05 % ophthalmic emulsion    RESTASIS     Place 1 drop into both eyes every 12 hours        DAILY MULTIVITAMIN PO      Take 1 tablet by mouth daily.    Routine general medical examination at a health care facility       dexamethasone 4 MG tablet    DECADRON    28 tablet    Take 20mg (5 tablets) by mouth every week on the morning of velcade injection.    Multiple myeloma not having achieved remission (H)       erythromycin ophthalmic ointment    ROMYCIN     Place 1 Application into both eyes At Bedtime        lidocaine-prilocaine cream    EMLA    30 g    Apply topically as needed for moderate pain Apply dollop size amount to port site 30-60 min prior to accessing    Multiple myeloma not having achieved remission (H)       LORazepam 0.5 MG tablet    ATIVAN    30 tablet    Take 1 tablet (0.5 mg) by mouth every 8 hours as needed for anxiety    Multiple myeloma not having achieved remission (H)       metoprolol succinate 50 MG 24 hr tablet    TOPROL-XL    270 tablet    TAKE 2 TABLETS BY MOUTH EVERY MORNING, AND 1 TABLET EVERY EVENING    Paroxysmal atrial fibrillation (H)       oxyCODONE IR 15 MG tablet    ROXICODONE    60 tablet    Take 1 tablet (15 mg) by mouth every 8 hours as needed for pain maximum 4 tablet(s) per day    Multiple myeloma not having achieved remission (H)       polyethylene glycol powder    MIRALAX/GLYCOLAX     Take 1 capful by mouth daily as needed    Bilateral leg edema       SIMBRINZA 1-0.2 % ophthalmic suspension   Generic drug:  brinzolamide-brimonidine      Place 1 drop into the right eye 2 times daily 1 drop AM and PM        triamcinolone 0.5 % cream    KENALOG    15 g    Apply sparingly to affected area three times daily. 1-2 weeks , as needed    Rash and nonspecific skin eruption       TYLENOL PO      Take 500 mg by mouth every 6 hours as needed for mild pain or fever        UNABLE TO FIND      MEDICATION NAME: Fresh Coat eye drops        VITAMIN D3 PO      Take 1,000 Units  by mouth daily        warfarin 4 MG tablet    COUMADIN    110 tablet    Take 1-2 tablets daily as directed by INR clinic.    Paroxysmal atrial fibrillation (H), Long-term (current) use of anticoagulants       ZOMETA IV      Inject into the vein every 30 days Every 3 month dosing

## 2018-04-30 ENCOUNTER — OFFICE VISIT (OUTPATIENT)
Dept: CARDIOLOGY | Facility: CLINIC | Age: 83
End: 2018-04-30
Attending: INTERNAL MEDICINE
Payer: COMMERCIAL

## 2018-04-30 VITALS
OXYGEN SATURATION: 100 % | HEIGHT: 66 IN | SYSTOLIC BLOOD PRESSURE: 134 MMHG | BODY MASS INDEX: 23.63 KG/M2 | DIASTOLIC BLOOD PRESSURE: 84 MMHG | WEIGHT: 147 LBS | HEART RATE: 86 BPM

## 2018-04-30 DIAGNOSIS — E78.5 HYPERLIPIDEMIA LDL GOAL <160: ICD-10-CM

## 2018-04-30 DIAGNOSIS — I77.810 ASCENDING AORTA DILATATION (H): ICD-10-CM

## 2018-04-30 DIAGNOSIS — I48.20 CHRONIC ATRIAL FIBRILLATION (H): ICD-10-CM

## 2018-04-30 DIAGNOSIS — I47.10 SVT (SUPRAVENTRICULAR TACHYCARDIA) (H): Primary | ICD-10-CM

## 2018-04-30 DIAGNOSIS — I48.0 PAROXYSMAL ATRIAL FIBRILLATION (H): ICD-10-CM

## 2018-04-30 PROCEDURE — 99213 OFFICE O/P EST LOW 20 MIN: CPT | Performed by: INTERNAL MEDICINE

## 2018-04-30 NOTE — LETTER
4/30/2018    Addy Frias MD  [address]  [facility] [address]    RE: Amira Arreola       Dear Colleague,    I had the pleasure of seeing Amira Arreola in the [facility].    HPI and Plan:   See dictation(#[id number])    Orders Placed This Encounter   Procedures     Follow-Up with Cardiologist     Echocardiogram       No orders of the defined types were placed in this encounter.      There are no discontinued medications.      Encounter Diagnoses   Name Primary?     Paroxysmal atrial fibrillation (H)      SVT (supraventricular tachycardia) (H) Yes     Ascending aorta dilatation (H)      Hyperlipidemia LDL goal <160      Chronic atrial fibrillation (H)        CURRENT MEDICATIONS:  Current Outpatient Prescriptions   Medication Sig Dispense Refill     Acetaminophen (TYLENOL PO) Take 500 mg by mouth every 6 hours as needed for mild pain or fever       Calcium Citrate-Vitamin D (CALCIUM CITRATE + PO) Take 2,000 mg by mouth daily 2 tabs       carboxymethylcellulose (REFRESH PLUS) 0.5 % SOLN 1 drop 4 times daily       Cholecalciferol (VITAMIN D3 PO) Take 1,000 Units by mouth daily       cycloSPORINE (RESTASIS) 0.05 % ophthalmic emulsion Place 1 drop into both eyes every 12 hours        Desloratadine (CLARINEX PO) Take by mouth daily Taking claritin       dexamethasone (DECADRON) 4 MG tablet Take 20mg (5 tablets) by mouth every week on the morning of velcade injection. 28 tablet 0     erythromycin (ROMYCIN) ophthalmic ointment Place 1 Application into both eyes At Bedtime        lidocaine-prilocaine (EMLA) cream Apply topically as needed for moderate pain Apply dollop size amount to port site 30-60 min prior to accessing 30 g 1     LORazepam (ATIVAN) 0.5 MG tablet Take 1 tablet (0.5 mg) by mouth every 8 hours as needed for anxiety 30 tablet 3     metoprolol succinate (TOPROL-XL) 50 MG 24 hr tablet TAKE 2 TABLETS BY MOUTH EVERY MORNING, AND 1 TABLET EVERY EVENING 270 tablet 1      Multiple Vitamin (DAILY MULTIVITAMIN PO) Take 1 tablet by mouth daily.       oxyCODONE IR (ROXICODONE) 15 MG tablet Take 1 tablet (15 mg) by mouth every 8 hours as needed for pain maximum 4 tablet(s) per day 60 tablet 0     polyethylene glycol (MIRALAX/GLYCOLAX) powder Take 1 capful by mouth daily as needed        Prochlorperazine Maleate (COMPAZINE PO) Take 10 mg by mouth daily as needed        SIMBRINZA 1-0.2 % ophthalmic suspension Place 1 drop into the right eye 2 times daily 1 drop AM and PM  2     triamcinolone (KENALOG) 0.5 % cream Apply sparingly to affected area three times daily. 1-2 weeks , as needed 15 g 1     Zoledronic Acid (ZOMETA IV) Inject into the vein every 30 days Every 3 month dosing       acyclovir (ZOVIRAX) 400 MG tablet Take 1 tablet (400 mg) by mouth 2 times daily Start 1 week prior to daratumumab and continue for 3 months after treatment is complete. 60 tablet 11     UNABLE TO FIND MEDICATION NAME: Fresh Coat eye drops       warfarin (COUMADIN) 4 MG tablet Take 1-2 tablets daily as directed by INR clinic. 110 tablet 0       ALLERGIES     Allergies   Allergen Reactions     Penicillin [Penicillins] Rash     Blotches on chest        PAST MEDICAL HISTORY:  Past Medical History:   Diagnosis Date     Ascending aorta dilatation (H) 4/16    on echo, mild     Cancer, metastatic to bone (H)     due to myeloma     Colonic polyp 2008    adenomatous, fu 2013 tics only     Compression fracture 2016    multiple areas of spine     Dry eyes      Elevated MCV 2015    b12 and folic acid nl     HTN (hypertension) 2000    off meds for years     Menorrhagia 2002    hysteroscopy and d and c done     MGUS (monoclonal gammopathy of unknown significance) 2015    eval by Dr. Roberts     Multiple myeloma (H) 2016    dx 5/16 at [place], bone lesions seen on mri 6/16     OAB (overactive bladder) 2013    Dr. Grullon     Osteoporosis     fu done 2010 and stable, went off meds then, fu done 2013; has had gyn fu and added  evista  by gyn     Palpitations     nl echo, mildly dilated asc aorta     Paroxysmal atrial fibrillation (H)     had palp and ziopatch showed it, echo nl lv fxn, mild mr and tr, added coum and toprol, toprol dose raised 16     Sciatica of left side     Dr. Helen Ricardo      SVT (supraventricular tachycardia) (H)     on ziopatch     Thrombocytopenia (H)        PAST SURGICAL HISTORY:  Past Surgical History:   Procedure Laterality Date     BONE MARROW BIOPSY, BONE SPECIMEN, NEEDLE/TROCAR N/A 2016    Procedure: BIOPSY BONE MARROW;  Surgeon: Bryan Patel MD;  Location:  GI     CATARACT IOL, RT/LT        SECTION  ,      COLONOSCOPY  2013    Procedure: COLONOSCOPY;  COLONOSCOPY;  Surgeon: Steffany Rockwell MD;  Location:  GI     EXCISE EXOSTOSIS TIBIA / FIBULA  2014    Procedure: EXCISE EXOSTOSIS TIBIA / FIBULA;  Surgeon: Naila Pichardo MD;  Location:  SD     hysteroscopy and d and c      due to bleeding     left anle replacement       right ankle surgery         FAMILY HISTORY:  Family History   Problem Relation Age of Onset     HEART DISEASE Father      C.A.D. Mother      CEREBROVASCULAR DISEASE Brother      Family History Negative Sister      Family History Negative Sister      Family History Negative Brother        SOCIAL HISTORY:  Social History     Social History     Marital status:      Spouse name: Tom     Number of children: 6     Years of education: N/A     Occupational History     rn anesthetist Retired     Social History Main Topics     Smoking status: Never Smoker     Smokeless tobacco: Never Used     Alcohol use No     Drug use: No     Sexual activity: No     Other Topics Concern     None     Social History Narrative       Review of Systems:  Skin:  Positive for bruising     Eyes:  Positive for glasses    ENT:  Negative      Respiratory:  Negative       Cardiovascular:    Positive  "for;palpitations;lightheadedness;edema    Gastroenterology: Negative      Genitourinary:  not assessed      Musculoskeletal:  Positive for arthritis    Neurologic:  Negative      Psychiatric:  Positive for anxiety    Heme/Lymph/Imm:  Positive for   multiple myeloma  Endocrine:  Negative        Physical Exam:  Vitals: /84  Pulse 86  Ht 1.664 m (5' 5.5\")  Wt 66.7 kg (147 lb)  SpO2 100%  BMI 24.09 kg/m2    Constitutional:           Skin:             Head:           Eyes:           Lymph:      ENT:           Neck:           Respiratory:            Cardiac:                                                           GI:           Extremities and Muscular Skeletal:                 Neurological:           Psych:             CC  Ramos Rivera MD  [address]  [place] [address]                    Thank you for allowing me to participate in the care of your patient.      Sincerely,     Ramos Rivera MD     [facility]    cc:   Ramos Rivera MD  [address]  [place] [address]        "

## 2018-04-30 NOTE — PROGRESS NOTES
HPI and Plan:   See dictation(#[id number])    Orders Placed This Encounter   Procedures     Follow-Up with Cardiologist     Echocardiogram       No orders of the defined types were placed in this encounter.      There are no discontinued medications.      Encounter Diagnoses   Name Primary?     Paroxysmal atrial fibrillation (H)      SVT (supraventricular tachycardia) (H) Yes     Ascending aorta dilatation (H)      Hyperlipidemia LDL goal <160      Chronic atrial fibrillation (H)        CURRENT MEDICATIONS:  Current Outpatient Prescriptions   Medication Sig Dispense Refill     Acetaminophen (TYLENOL PO) Take 500 mg by mouth every 6 hours as needed for mild pain or fever       Calcium Citrate-Vitamin D (CALCIUM CITRATE + PO) Take 2,000 mg by mouth daily 2 tabs       carboxymethylcellulose (REFRESH PLUS) 0.5 % SOLN 1 drop 4 times daily       Cholecalciferol (VITAMIN D3 PO) Take 1,000 Units by mouth daily       cycloSPORINE (RESTASIS) 0.05 % ophthalmic emulsion Place 1 drop into both eyes every 12 hours        Desloratadine (CLARINEX PO) Take by mouth daily Taking claritin       dexamethasone (DECADRON) 4 MG tablet Take 20mg (5 tablets) by mouth every week on the morning of velcade injection. 28 tablet 0     erythromycin (ROMYCIN) ophthalmic ointment Place 1 Application into both eyes At Bedtime        lidocaine-prilocaine (EMLA) cream Apply topically as needed for moderate pain Apply dollop size amount to port site 30-60 min prior to accessing 30 g 1     LORazepam (ATIVAN) 0.5 MG tablet Take 1 tablet (0.5 mg) by mouth every 8 hours as needed for anxiety 30 tablet 3     metoprolol succinate (TOPROL-XL) 50 MG 24 hr tablet TAKE 2 TABLETS BY MOUTH EVERY MORNING, AND 1 TABLET EVERY EVENING 270 tablet 1     Multiple Vitamin (DAILY MULTIVITAMIN PO) Take 1 tablet by mouth daily.       oxyCODONE IR (ROXICODONE) 15 MG tablet Take 1 tablet (15 mg) by mouth every 8 hours as needed for pain maximum 4 tablet(s) per day 60 tablet 0      polyethylene glycol (MIRALAX/GLYCOLAX) powder Take 1 capful by mouth daily as needed        Prochlorperazine Maleate (COMPAZINE PO) Take 10 mg by mouth daily as needed        SIMBRINZA 1-0.2 % ophthalmic suspension Place 1 drop into the right eye 2 times daily 1 drop AM and PM  2     triamcinolone (KENALOG) 0.5 % cream Apply sparingly to affected area three times daily. 1-2 weeks , as needed 15 g 1     Zoledronic Acid (ZOMETA IV) Inject into the vein every 30 days Every 3 month dosing       acyclovir (ZOVIRAX) 400 MG tablet Take 1 tablet (400 mg) by mouth 2 times daily Start 1 week prior to daratumumab and continue for 3 months after treatment is complete. 60 tablet 11     UNABLE TO FIND MEDICATION NAME: Fresh Coat eye drops       warfarin (COUMADIN) 4 MG tablet Take 1-2 tablets daily as directed by INR clinic. 110 tablet 0       ALLERGIES     Allergies   Allergen Reactions     Penicillin [Penicillins] Rash     Blotches on chest        PAST MEDICAL HISTORY:  Past Medical History:   Diagnosis Date     Ascending aorta dilatation (H) 4/16    on echo, mild     Cancer, metastatic to bone (H)     due to myeloma     Colonic polyp 2008    adenomatous, fu 2013 tics only     Compression fracture 2016    multiple areas of spine     Dry eyes      Elevated MCV 2015    b12 and folic acid nl     HTN (hypertension) 2000    off meds for years     Menorrhagia 2002    hysteroscopy and d and c done     MGUS (monoclonal gammopathy of unknown significance) 2015    eval by Dr. Roberts     Multiple myeloma (H) 2016    dx 5/16 at [place], bone lesions seen on mri 6/16     OAB (overactive bladder) 2013    Dr. Grullon     Osteoporosis     fu done 2010 and stable, went off meds then, fu done 2013; has had gyn fu and added evista 2013 by gyn     Palpitations 4/16    nl echo, mildly dilated asc aorta     Paroxysmal atrial fibrillation (H) 4/16    had palp and ziopatch showed it, echo nl lv fxn, mild mr and tr, added coum and toprol, toprol dose  raised 16     Sciatica of left side     Dr. Helen Ricardo      SVT (supraventricular tachycardia) (H)     on ziopatch     Thrombocytopenia (H)        PAST SURGICAL HISTORY:  Past Surgical History:   Procedure Laterality Date     BONE MARROW BIOPSY, BONE SPECIMEN, NEEDLE/TROCAR N/A 2016    Procedure: BIOPSY BONE MARROW;  Surgeon: Bryan Patel MD;  Location:  GI     CATARACT IOL, RT/LT        SECTION  1965, 1966     COLONOSCOPY  2013    Procedure: COLONOSCOPY;  COLONOSCOPY;  Surgeon: Steffany Rockwell MD;  Location:  GI     EXCISE EXOSTOSIS TIBIA / FIBULA  2014    Procedure: EXCISE EXOSTOSIS TIBIA / FIBULA;  Surgeon: Naila Pichardo MD;  Location:  SD     hysteroscopy and d and c      due to bleeding     left anle replacement       right ankle surgery         FAMILY HISTORY:  Family History   Problem Relation Age of Onset     HEART DISEASE Father      C.A.D. Mother      CEREBROVASCULAR DISEASE Brother      Family History Negative Sister      Family History Negative Sister      Family History Negative Brother        SOCIAL HISTORY:  Social History     Social History     Marital status:      Spouse name: Tom     Number of children: 6     Years of education: N/A     Occupational History     rn anesthetist Retired     Social History Main Topics     Smoking status: Never Smoker     Smokeless tobacco: Never Used     Alcohol use No     Drug use: No     Sexual activity: No     Other Topics Concern     None     Social History Narrative       Review of Systems:  Skin:  Positive for bruising     Eyes:  Positive for glasses    ENT:  Negative      Respiratory:  Negative       Cardiovascular:    Positive for;palpitations;lightheadedness;edema    Gastroenterology: Negative      Genitourinary:  not assessed      Musculoskeletal:  Positive for arthritis    Neurologic:  Negative      Psychiatric:  Positive for anxiety   "  Heme/Lymph/Imm:  Positive for   multiple myeloma  Endocrine:  Negative        Physical Exam:  Vitals: /84  Pulse 86  Ht 1.664 m (5' 5.5\")  Wt 66.7 kg (147 lb)  SpO2 100%  BMI 24.09 kg/m2    Constitutional:           Skin:             Head:           Eyes:           Lymph:      ENT:           Neck:           Respiratory:            Cardiac:                                                           GI:           Extremities and Muscular Skeletal:                 Neurological:           Psych:             CC  Ramos Rivera MD  [address]  [place] [address]                  "

## 2018-04-30 NOTE — MR AVS SNAPSHOT
After Visit Summary   4/30/2018    Amira Arreola    MRN: [id number]           Patient Information     Date Of Birth          7/17/1932        Visit Information        Provider Department      4/30/2018 10:15 AM Ramos Rivera MD [facility]        Today's Diagnoses     SVT (supraventricular tachycardia) (H)    -  1    Paroxysmal atrial fibrillation (H)        Ascending aorta dilatation (H)        Hyperlipidemia LDL goal <160        Chronic atrial fibrillation (H)           Follow-ups after your visit        Additional Services     Follow-Up with Cardiologist                 Your next 10 appointments already scheduled     May 02, 2018 10:00 AM CDT   Level 2 with  INFUSION CHAIR 15   [facility] ([facility])    [facility]  [address]  [place] [address]   [phone]            May 09, 2018  8:30 AM CDT   Level 2 with SH INFUSION CHAIR 9   [facility] ([facility])    [facility]  [address]  [place] [address] [phone]            May 09, 2018  9:20 AM CDT   Return Visit with Shayne Roberts MD   [facility] ([facility])    [facility]  [address]  [place] [address]   [phone]              Future tests that were ordered for you today     Open Future Orders        Priority Expected Expires Ordered    Echocardiogram Routine 4/30/2019 5/1/2019 4/30/2018    Follow-Up with Cardiologist Routine 4/30/2019 5/1/2019 4/30/2018            Who to contact     If you have questions or need follow up information about today's clinic visit or your schedule please contact [facility] directly at [phone].  Normal or non-critical lab and imaging results will be communicated to you by Johan  "letter or phone within 4 business days after the clinic has received the results. If you do not hear from us within 7 days, please contact the clinic through rPath or phone. If you have a critical or abnormal lab result, we will notify you by phone as soon as possible.  Submit refill requests through rPath or call your pharmacy and they will forward the refill request to us. Please allow 3 business days for your refill to be completed.          Additional Information About Your Visit        rPath Information     rPath lets you send messages to your doctor, view your test results, renew your prescriptions, schedule appointments and more. To sign up, go to [email or web address] . Click on \"Log in\" on the left side of the screen, which will take you to the Welcome page. Then click on \"Sign up Now\" on the right side of the page.     You will be asked to enter the access code listed below, as well as some personal information. Please follow the directions to create your username and password.     Your access code is: [email or web address]  Expires: 7/3/2018  3:28 PM     Your access code will  in 90 days. If you need help or a new code, please call your [facility] clinic or [phone].        Care EveryWhere ID     This is your Care EveryWhere ID. This could be used by other organizations to access your [facility] medical records  [id number]        Your Vitals Were     Pulse Height Pulse Oximetry BMI (Body Mass Index)          96 1.664 m (5' 5.5\") 100% 24.09 kg/m2         Blood Pressure from Last 3 Encounters:   18 134/84   18 133/76   18 133/76    Weight from Last 3 Encounters:   18 66.7 kg (147 lb)   18 68.6 kg (151 lb 3.2 oz)   18 68.6 kg (151 lb 3.2 oz)              We Performed the Following     Follow-Up with Cardiologist        Primary Care Provider Office Phone # Fax #    Addy Frias [phone] [address]  [facility] [address]   "      Equal Access to Services     [facility] : Hadii liane murcia yvetteana Xiangali, wajanetda luqadaha, qaomidta christianrandyhung wood. So [facility] [phone].    ATENCIÓN: Si habla español, tiene a barlow disposición servicios gratuitos de asistencia lingüística. Barame al [phone].    We comply with applicable federal civil rights laws and [place] laws. We do not discriminate on the basis of race, color, national origin, age, disability, sex, sexual orientation, or gender identity.            Thank you!     Thank you for choosing [facility]  for your care. Our goal is always to provide you with excellent care. Hearing back from our patients is one way we can continue to improve our services. Please take a few minutes to complete the written survey that you may receive in the mail after your visit with us. Thank you!             Your Updated Medication List - Protect others around you: Learn how to safely use, store and throw away your medicines at www.disposemymeds.org.          This list is accurate as of 4/30/18 10:59 AM.  Always use your most recent med list.                   Brand Name Dispense Instructions for use Diagnosis    acyclovir 400 MG tablet    ZOVIRAX    60 tablet    Take 1 tablet (400 mg) by mouth 2 times daily Start 1 week prior to daratumumab and continue for 3 months after treatment is complete.    Multiple myeloma not having achieved remission (H)       CALCIUM CITRATE + PO      Take 2,000 mg by mouth daily 2 tabs        carboxymethylcellulose 0.5 % Soln ophthalmic solution    REFRESH PLUS     1 drop 4 times daily        CLARINEX PO      Take by mouth daily Taking claritin        COMPAZINE PO      Take 10 mg by mouth daily as needed        cycloSPORINE 0.05 % ophthalmic emulsion    RESTASIS     Place 1 drop into both eyes every 12 hours        DAILY MULTIVITAMIN PO      Take 1 tablet by mouth daily.    Routine general medical  examination at a health care facility       dexamethasone 4 MG tablet    DECADRON    28 tablet    Take 20mg (5 tablets) by mouth every week on the morning of velcade injection.    Multiple myeloma not having achieved remission (H)       erythromycin ophthalmic ointment    ROMYCIN     Place 1 Application into both eyes At Bedtime        lidocaine-prilocaine cream    EMLA    30 g    Apply topically as needed for moderate pain Apply dollop size amount to port site 30-60 min prior to accessing    Multiple myeloma not having achieved remission (H)       LORazepam 0.5 MG tablet    ATIVAN    30 tablet    Take 1 tablet (0.5 mg) by mouth every 8 hours as needed for anxiety    Multiple myeloma not having achieved remission (H)       metoprolol succinate 50 MG 24 hr tablet    TOPROL-XL    270 tablet    TAKE 2 TABLETS BY MOUTH EVERY MORNING, AND 1 TABLET EVERY EVENING    Paroxysmal atrial fibrillation (H)       oxyCODONE IR 15 MG tablet    ROXICODONE    60 tablet    Take 1 tablet (15 mg) by mouth every 8 hours as needed for pain maximum 4 tablet(s) per day    Multiple myeloma not having achieved remission (H)       polyethylene glycol powder    MIRALAX/GLYCOLAX     Take 1 capful by mouth daily as needed    Bilateral leg edema       SIMBRINZA 1-0.2 % ophthalmic suspension   Generic drug:  brinzolamide-brimonidine      Place 1 drop into the right eye 2 times daily 1 drop AM and PM        triamcinolone 0.5 % cream    KENALOG    15 g    Apply sparingly to affected area three times daily. 1-2 weeks , as needed    Rash and nonspecific skin eruption       TYLENOL PO      Take 500 mg by mouth every 6 hours as needed for mild pain or fever        UNABLE TO FIND      MEDICATION NAME: Fresh Coat eye drops        VITAMIN D3 PO      Take 1,000 Units by mouth daily        warfarin 4 MG tablet    COUMADIN    110 tablet    Take 1-2 tablets daily as directed by INR clinic.    Paroxysmal atrial fibrillation (H), Long-term (current) use of  anticoagulants       ZOMETA IV      Inject into the vein every 30 days Every 3 month dosing

## 2018-04-30 NOTE — LETTER
4/30/2018      Addy Frias MD  [address]  [facility] [address]      RE: Amira IVY Maxwell       Dear Colleague,    I had the pleasure of seeing Amira Arreola in the [facility].    Service Date: 04/30/2018      REASON FOR CARDIOLOGY VISIT:  Follow up atrial fibrillation.      HISTORY OF PRESENT ILLNESS:  Ms. Arreola is a very pleasant 85-year-old female with multiple myeloma on chemotherapy, atrial fibrillation on rate control strategy, hypertension.  She is coming for routine followup regarding atrial fibrillation.  To be noted, she is essentially asymptomatic from atrial fibrillation, and after a long discussion weighing pros and cons of rate control rhythm, control strategy, she has been maintained on rate control strategy.  She is on Coumadin for stroke prophylaxis with CHADS2-VASc score of 4.  Echocardiogram has shown normal LVEF with 1-2+ mitral regurgitation, mild to moderate bilateral atrial enlargement, mild pulmonary hypertension and mildly dilated aortic root and ascending aorta.  Today, she is coming for routine followup accompanied by her daughter.  She was recently seen by Oncology.  It looks like she is responding well to chemotherapy.  He has no significant complaints other than feeling tired at the end of the day.  This is longstanding, nonprogressive.  She does not have any symptoms of palpitation, presyncope, syncope, dizziness, chest discomfort or shortness of breath.  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      PHYSICAL EXAMINATION:   VITAL SIGNS:  Blood pressure 134/84, heart rate 86, irregular, weight 147 pounds, BMI 24.   GENERAL:  The patient appears pleasant, comfortable.   NECK:  Normal JVP, no bruit.   CARDIOVASCULAR SYSTEM:  Irregular, normal rate.  There is grade 2/6 systolic murmur heard best over the left lower sternal border.  No S3, S4, rub or gallop.   RESPIRATORY SYSTEM:  Clear to  auscultation bilaterally.     GI SYSTEM:  Abdomen soft, nontender.   EXTREMITIES:  1+ pitting pedal edema.   NEUROLOGICAL:  Alert and oriented x3.   PSYCHIATRIC:  Normal affect.   SKIN:  No obvious lesions.   HEENT:  No pallor or icterus.      IMPRESSION AND PLAN:  A pleasant 85-year-old female with chronic atrial fibrillation on rate control strategy.  CHADS2-VASc score of 4 on Coumadin for stroke prophylaxis with normal LV function with other comorbidities of multiple myeloma, on chemotherapy.  Overall, cardiac status-wise, she is doing well.  She is essentially asymptomatic from atrial fibrillation.  Ventricular rates are well controlled.  At this time, we will continue current strategy of rate control and stroke prophylaxis with Coumadin.  If she continues to feel stable cardiac status wise, we can see her back in 1 year with an echocardiogram, sooner if she notices any change in clinical status, especially if any exertion related symptoms.         MELISSA MONSIVAIS MD             D: 2018   T: 2018   MT: JENI      Name:     ALBERTO PARSON   MRN:      [id number]        Account:      [id number]   :      1932           Service Date: 2018      Document: [id number]         Outpatient Encounter Prescriptions as of 2018   Medication Sig Dispense Refill     Acetaminophen (TYLENOL PO) Take 500 mg by mouth every 6 hours as needed for mild pain or fever       Calcium Citrate-Vitamin D (CALCIUM CITRATE + PO) Take 2,000 mg by mouth daily 2 tabs       carboxymethylcellulose (REFRESH PLUS) 0.5 % SOLN 1 drop 4 times daily       Cholecalciferol (VITAMIN D3 PO) Take 1,000 Units by mouth daily       cycloSPORINE (RESTASIS) 0.05 % ophthalmic emulsion Place 1 drop into both eyes every 12 hours        Desloratadine (CLARINEX PO) Take by mouth daily Taking claritin       dexamethasone (DECADRON) 4 MG tablet Take 20mg (5 tablets) by mouth every week on the morning of velcade injection. 28 tablet 0      erythromycin (ROMYCIN) ophthalmic ointment Place 1 Application into both eyes At Bedtime        lidocaine-prilocaine (EMLA) cream Apply topically as needed for moderate pain Apply dollop size amount to port site 30-60 min prior to accessing 30 g 1     LORazepam (ATIVAN) 0.5 MG tablet Take 1 tablet (0.5 mg) by mouth every 8 hours as needed for anxiety 30 tablet 3     metoprolol succinate (TOPROL-XL) 50 MG 24 hr tablet TAKE 2 TABLETS BY MOUTH EVERY MORNING, AND 1 TABLET EVERY EVENING 270 tablet 1     Multiple Vitamin (DAILY MULTIVITAMIN PO) Take 1 tablet by mouth daily.       oxyCODONE IR (ROXICODONE) 15 MG tablet Take 1 tablet (15 mg) by mouth every 8 hours as needed for pain maximum 4 tablet(s) per day 60 tablet 0     polyethylene glycol (MIRALAX/GLYCOLAX) powder Take 1 capful by mouth daily as needed        Prochlorperazine Maleate (COMPAZINE PO) Take 10 mg by mouth daily as needed        SIMBRINZA 1-0.2 % ophthalmic suspension Place 1 drop into the right eye 2 times daily 1 drop AM and PM  2     triamcinolone (KENALOG) 0.5 % cream Apply sparingly to affected area three times daily. 1-2 weeks , as needed 15 g 1     Zoledronic Acid (ZOMETA IV) Inject into the vein every 30 days Every 3 month dosing       acyclovir (ZOVIRAX) 400 MG tablet Take 1 tablet (400 mg) by mouth 2 times daily Start 1 week prior to daratumumab and continue for 3 months after treatment is complete. 60 tablet 11     UNABLE TO FIND MEDICATION NAME: Fresh Coat eye drops       warfarin (COUMADIN) 4 MG tablet Take 1-2 tablets daily as directed by INR clinic. 110 tablet 0     No facility-administered encounter medications on file as of 4/30/2018.            Again, thank you for allowing me to participate in the care of your patient.      Sincerely,    Ramos Rivera MD     [facility]

## 2018-04-30 NOTE — PROGRESS NOTES
Service Date: 04/30/2018      REASON FOR CARDIOLOGY VISIT:  Follow up atrial fibrillation.      HISTORY OF PRESENT ILLNESS:  Ms. Arreola is a very pleasant 85-year-old female with multiple myeloma on chemotherapy, atrial fibrillation on rate control strategy, hypertension.  She is coming for routine followup regarding atrial fibrillation.  To be noted, she is essentially asymptomatic from atrial fibrillation, and after a long discussion weighing pros and cons of rate control rhythm, control strategy, she has been maintained on rate control strategy.  She is on Coumadin for stroke prophylaxis with CHADS2-VASc score of 4.  Echocardiogram has shown normal LVEF with 1-2+ mitral regurgitation, mild to moderate bilateral atrial enlargement, mild pulmonary hypertension and mildly dilated aortic root and ascending aorta.  Today, she is coming for routine followup accompanied by her daughter.  She was recently seen by Oncology.  It looks like she is responding well to chemotherapy.  He has no significant complaints other than feeling tired at the end of the day.  This is longstanding, nonprogressive.  She does not have any symptoms of palpitation, presyncope, syncope, dizziness, chest discomfort or shortness of breath.  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      PHYSICAL EXAMINATION:   VITAL SIGNS:  Blood pressure 134/84, heart rate 86, irregular, weight 147 pounds, BMI 24.   GENERAL:  The patient appears pleasant, comfortable.   NECK:  Normal JVP, no bruit.   CARDIOVASCULAR SYSTEM:  Irregular, normal rate.  There is grade 2/6 systolic murmur heard best over the left lower sternal border.  No S3, S4, rub or gallop.   RESPIRATORY SYSTEM:  Clear to auscultation bilaterally.     GI SYSTEM:  Abdomen soft, nontender.   EXTREMITIES:  1+ pitting pedal edema.   NEUROLOGICAL:  Alert and oriented x3.   PSYCHIATRIC:  Normal affect.   SKIN:  No obvious lesions.   HEENT:  No pallor or  icterus.      IMPRESSION AND PLAN:  A pleasant 85-year-old female with chronic atrial fibrillation on rate control strategy.  CHADS2-VASc score of 4 on Coumadin for stroke prophylaxis with normal LV function with other comorbidities of multiple myeloma, on chemotherapy.  Overall, cardiac status-wise, she is doing well.  She is essentially asymptomatic from atrial fibrillation.  Ventricular rates are well controlled.  At this time, we will continue current strategy of rate control and stroke prophylaxis with Coumadin.  If she continues to feel stable cardiac status wise, we can see her back in 1 year with an echocardiogram, sooner if she notices any change in clinical status, especially if any exertion related symptoms.         MELISSA MONSIVAIS MD             D: 2018   T: 2018   MT: JENI      Name:     ALBERTO PARSON   MRN:      [id number]        Account:      [id number]   :      1932           Service Date: 2018      Document: [id number]

## 2018-05-02 ENCOUNTER — ANTICOAGULATION THERAPY VISIT (OUTPATIENT)
Dept: FAMILY MEDICINE | Facility: CLINIC | Age: 83
End: 2018-05-02
Payer: MEDICARE

## 2018-05-02 ENCOUNTER — HOSPITAL ENCOUNTER (OUTPATIENT)
Facility: CLINIC | Age: 83
Setting detail: SPECIMEN
Discharge: HOME | End: 2018-05-02
Attending: INTERNAL MEDICINE | Admitting: INTERNAL MEDICINE
Payer: MEDICARE

## 2018-05-02 ENCOUNTER — INFUSION THERAPY VISIT (OUTPATIENT)
Dept: INFUSION THERAPY | Facility: CLINIC | Age: 83
End: 2018-05-02
Attending: INTERNAL MEDICINE
Payer: MEDICARE

## 2018-05-02 ENCOUNTER — TELEPHONE (OUTPATIENT)
Dept: FAMILY MEDICINE | Facility: CLINIC | Age: 83
End: 2018-05-02

## 2018-05-02 VITALS
WEIGHT: 147.6 LBS | DIASTOLIC BLOOD PRESSURE: 80 MMHG | OXYGEN SATURATION: 98 % | TEMPERATURE: 97.8 F | BODY MASS INDEX: 24.19 KG/M2 | SYSTOLIC BLOOD PRESSURE: 135 MMHG | HEART RATE: 98 BPM | RESPIRATION RATE: 20 BRPM

## 2018-05-02 DIAGNOSIS — I48.0 PAROXYSMAL ATRIAL FIBRILLATION (H): ICD-10-CM

## 2018-05-02 DIAGNOSIS — Z95.828 PORTACATH IN PLACE: ICD-10-CM

## 2018-05-02 DIAGNOSIS — C90.00 MULTIPLE MYELOMA NOT HAVING ACHIEVED REMISSION (H): Primary | ICD-10-CM

## 2018-05-02 DIAGNOSIS — C79.51 CANCER, METASTATIC TO BONE (H): ICD-10-CM

## 2018-05-02 LAB
BASOPHILS # BLD AUTO: 0 10E9/L (ref 0–0.2)
BASOPHILS NFR BLD AUTO: 0 %
DIFFERENTIAL METHOD BLD: ABNORMAL
EOSINOPHIL # BLD AUTO: 0 10E9/L (ref 0–0.7)
EOSINOPHIL NFR BLD AUTO: 0.5 %
ERYTHROCYTE [DISTWIDTH] IN BLOOD BY AUTOMATED COUNT: 14.8 % (ref 10–15)
HCT VFR BLD AUTO: 37.5 % (ref 35–47)
HGB BLD-MCNC: 12.4 G/DL (ref 11.7–15.7)
IMM GRANULOCYTES # BLD: 0 10E9/L (ref 0–0.4)
IMM GRANULOCYTES NFR BLD: 0.7 %
INR PPP: 1.77 (ref 0.86–1.14)
LYMPHOCYTES # BLD AUTO: 0.4 10E9/L (ref 0.8–5.3)
LYMPHOCYTES NFR BLD AUTO: 7.4 %
MCH RBC QN AUTO: 32.5 PG (ref 26.5–33)
MCHC RBC AUTO-ENTMCNC: 33.1 G/DL (ref 31.5–36.5)
MCV RBC AUTO: 98 FL (ref 78–100)
MONOCYTES # BLD AUTO: 0.1 10E9/L (ref 0–1.3)
MONOCYTES NFR BLD AUTO: 2.2 %
NEUTROPHILS # BLD AUTO: 5.2 10E9/L (ref 1.6–8.3)
NEUTROPHILS NFR BLD AUTO: 89.2 %
NRBC # BLD AUTO: 0 10*3/UL
NRBC BLD AUTO-RTO: 0 /100
PLATELET # BLD AUTO: 136 10E9/L (ref 150–450)
RBC # BLD AUTO: 3.81 10E12/L (ref 3.8–5.2)
WBC # BLD AUTO: 5.8 10E9/L (ref 4–11)

## 2018-05-02 PROCEDURE — 85610 PROTHROMBIN TIME: CPT | Performed by: INTERNAL MEDICINE

## 2018-05-02 PROCEDURE — 25000128 H RX IP 250 OP 636: Performed by: INTERNAL MEDICINE

## 2018-05-02 PROCEDURE — 85025 COMPLETE CBC W/AUTO DIFF WBC: CPT | Performed by: INTERNAL MEDICINE

## 2018-05-02 PROCEDURE — 96374 THER/PROPH/DIAG INJ IV PUSH: CPT

## 2018-05-02 PROCEDURE — 96401 CHEMO ANTI-NEOPL SQ/IM: CPT

## 2018-05-02 RX ORDER — HEPARIN SODIUM (PORCINE) LOCK FLUSH IV SOLN 100 UNIT/ML 100 UNIT/ML
500 SOLUTION INTRAVENOUS EVERY 8 HOURS
Status: CANCELLED
Start: 2018-05-02

## 2018-05-02 RX ORDER — ZOLEDRONIC ACID 0.04 MG/ML
4 INJECTION, SOLUTION INTRAVENOUS ONCE
Status: COMPLETED | OUTPATIENT
Start: 2018-05-02 | End: 2018-05-02

## 2018-05-02 RX ORDER — HEPARIN SODIUM (PORCINE) LOCK FLUSH IV SOLN 100 UNIT/ML 100 UNIT/ML
500 SOLUTION INTRAVENOUS EVERY 8 HOURS
Status: DISCONTINUED | OUTPATIENT
Start: 2018-05-02 | End: 2018-05-02 | Stop reason: HOSPADM

## 2018-05-02 RX ADMIN — ZOLEDRONIC ACID 4 MG: 0.04 INJECTION, SOLUTION INTRAVENOUS at 10:30

## 2018-05-02 RX ADMIN — HEPARIN 500 UNITS: 100 SYRINGE at 10:56

## 2018-05-02 RX ADMIN — BORTEZOMIB 2.3 MG: 3.5 INJECTION, POWDER, LYOPHILIZED, FOR SOLUTION INTRAVENOUS; SUBCUTANEOUS at 11:06

## 2018-05-02 RX ADMIN — SODIUM CHLORIDE 250 ML: 9 INJECTION, SOLUTION INTRAVENOUS at 10:30

## 2018-05-02 ASSESSMENT — PAIN SCALES - GENERAL: PAINLEVEL: NO PAIN (0)

## 2018-05-02 NOTE — TELEPHONE ENCOUNTER
Reason for Call:  Other INR question    Detailed comments: patient had INR done today and is requesting a call back from nurse regarding medication.     Phone Number Patient can be reached at: Home number on file [phone] (home)    Best Time: any    Can we leave a detailed message on this number? YES    Call taken on 5/2/2018 at 1:33 PM by Joslyn Dunham

## 2018-05-02 NOTE — PROGRESS NOTES
"  ANTICOAGULATION FOLLOW-UP CLINIC VISIT    Patient Name:  Amira Arreola  Date:  5/2/2018  Contact Type:  Telephone    SUBJECTIVE:     Patient Findings     Positives Missed doses           OBJECTIVE    INR   Date Value Ref Range Status   05/02/2018 1.77 (H) 0.86 - 1.14 Final       ASSESSMENT / PLAN  INR assessment SUB    Recheck INR In: 1 WEEK    INR Location Outside lab      Anticoagulation Summary as of 5/2/2018     INR goal 2.0-3.0   Today's INR 1.77!   Maintenance plan 4 mg (4 mg x 1) every day   Full instructions 5/2: 6 mg; Otherwise 4 mg every day   Weekly total 28 mg   Plan last modified Tigist Corral RN (4/4/2018)   Next INR check 5/9/2018   Target end date Indefinite    Indications   Long-term (current) use of anticoagulants [Z79.01] [Z79.01]  Atrial fibrillation (H) [I48.91] (Resolved) [I48.91]         Anticoagulation Episode Summary     INR check location     Preferred lab     Send INR reminders to [facility] ANTICOAGULATION    Comments patient have standing INR order to be draw at infusion visit.  advise to call [facility] when have INR draw for dosing instruction.  patient can be reach at home phone [phone]      Anticoagulation Care Providers     Provider Role Specialty Phone number    Addy Frias MD [facility] Internal Medicine [phone]            See the Encounter Report to view Anticoagulation Flowsheet and Dosing Calendar (Go to Encounters tab in chart review, and find the Anticoagulation Therapy Visit)    Dosage adjustment made based on physician directed care plan.  INR 1.77 at infusion appt.  Spoke with patient who admits to \"skipping or cutting dose in half\" on days that she doesn't eat greens.  I advised letting INR nurses manage her dosing schedule.  Take 6 mg today, then resume 4 mg daily. Recheck one week.    Tigist Corral RN               "

## 2018-05-02 NOTE — PROGRESS NOTES
Infusion Nursing Note:  Amira Arreola presents today for Cycle 12 Day 22 Velcade and Zometa.    Patient seen by provider today: No   present during visit today: Not Applicable.    Note: Zometa stop time: 10:45am.    Intravenous Access:  Implanted Port.    Treatment Conditions:  Lab Results   Component Value Date    HGB 12.4 05/02/2018     Lab Results   Component Value Date    WBC 5.8 05/02/2018      Lab Results   Component Value Date    ANEU 5.2 05/02/2018     Lab Results   Component Value Date     05/02/2018      Lab Results   Component Value Date     06/13/2017                   Lab Results   Component Value Date    POTASSIUM 4.0 06/13/2017           No results found for: MAG         Lab Results   Component Value Date    CR 0.66 04/25/2018                   Lab Results   Component Value Date    BRADLEY 8.8 04/25/2018                Lab Results   Component Value Date    BILITOTAL 0.5 04/11/2018           Lab Results   Component Value Date    ALBUMIN 3.2 04/25/2018                    Lab Results   Component Value Date    ALT 25 04/11/2018           Lab Results   Component Value Date    AST 18 04/11/2018       Results reviewed, labs MET treatment parameters, ok to proceed with treatment.      Post Infusion Assessment:  Patient tolerated infusion without incident.  Patient tolerated injection without incident.  Blood return noted pre and post infusion.  Site patent and intact, free from redness, edema or discomfort.  No evidence of extravasations.  Access discontinued per protocol.    Discharge Plan:   Patient declined prescription refills.  Discharge instructions reviewed with: Patient.  Patient verbalized understanding of discharge instructions and all questions answered.  Copy of AVS reviewed with patient.  Patient will return 5/9/18 for next appointment.  Patient discharged in stable condition accompanied by: self.  Departure Mode: Ambulatory.    Senia Juan RN

## 2018-05-02 NOTE — MR AVS SNAPSHOT
After Visit Summary   5/2/2018    Amira Arreola    MRN: [id number]           Patient Information     Date Of Birth          7/17/1932        Visit Information        Provider Department      5/2/2018 10:00 AM  INFUSION CHAIR 15 [facility]        Today's Diagnoses     Multiple myeloma not having achieved remission (H)    -  1    Cancer, metastatic to bone (H)        Paroxysmal atrial fibrillation (H)        Portacath in place           Follow-ups after your visit        Your next 10 appointments already scheduled     May 09, 2018  8:30 AM CDT   Level 2 with  INFUSION CHAIR 9   [facility] ([facility])    [facility]  [address]  [place] [id number] [phone]            May 09, 2018  9:20 AM CDT   Return Visit with Shayne Roberts MD   [facility] ([facility])    [facility]  [address]  [place] [id number] [phone]              Who to contact     If you have questions or need follow up information about today's clinic visit or your schedule please contact [facility] directly at [phone].  Normal or non-critical lab and imaging results will be communicated to you by [employer], letter or phone within 4 business days after the clinic has received the results. If you do not hear from us within 7 days, please contact the clinic through [employer] or phone. If you have a critical or abnormal lab result, we will notify you by phone as soon as possible.  Submit refill requests through [employer] or call your pharmacy and they will forward the refill request to us. Please allow 3 business days for your refill to be completed.          Additional Information About Your Visit        [employer] Information     [employer] lets you send messages to your doctor, view your test results, renew your  "prescriptions, schedule appointments and more. To sign up, go to [email or web address] . Click on \"Log in\" on the left side of the screen, which will take you to the Welcome page. Then click on \"Sign up Now\" on the right side of the page.     You will be asked to enter the access code listed below, as well as some personal information. Please follow the directions to create your username and password.     Your access code is: [email or web address]  Expires: 7/3/2018  3:28 PM     Your access code will  in 90 days. If you need help or a new code, please call your [facility] clinic or [phone].        Care EveryWhere ID     This is your Care EveryWhere ID. This could be used by other organizations to access your [facility] medical records  [id number]        Your Vitals Were     Pulse Temperature Respirations Pulse Oximetry BMI (Body Mass Index)       98 97.8  F (36.6  C) (Oral) 20 98% 24.19 kg/m2        Blood Pressure from Last 3 Encounters:   18 135/80   18 134/84   18 133/76    Weight from Last 3 Encounters:   18 67 kg (147 lb 9.6 oz)   18 66.7 kg (147 lb)   18 68.6 kg (151 lb 3.2 oz)              We Performed the Following     CBC with platelets differential     INR        Primary Care Provider Office Phone # Fax #    Adyd Sean Frias [phone] [address]  [place] [address]        Equal Access to Services     [facility]: Hadii aad ku hadasho Soomaali, waaxda luqadaha, qaybta kaalmada alonso, hung dominique . So [facility] [phone].    ATENCIÓN: Si habla español, tiene a barlow disposición servicios gratuitos de asistencia lingüística. Llame al [phone].    We comply with applicable federal civil rights laws and [place] laws. We do not discriminate on the basis of race, color, national origin, age, disability, sex, sexual orientation, or gender identity.            Thank you!     Thank you for choosing " [facility]  for your care. Our goal is always to provide you with excellent care. Hearing back from our patients is one way we can continue to improve our services. Please take a few minutes to complete the written survey that you may receive in the mail after your visit with us. Thank you!             Your Updated Medication List - Protect others around you: Learn how to safely use, store and throw away your medicines at www.disposemymeds.org.          This list is accurate as of 5/2/18 11:18 AM.  Always use your most recent med list.                   Brand Name Dispense Instructions for use Diagnosis    acyclovir 400 MG tablet    ZOVIRAX    60 tablet    Take 1 tablet (400 mg) by mouth 2 times daily Start 1 week prior to daratumumab and continue for 3 months after treatment is complete.    Multiple myeloma not having achieved remission (H)       CALCIUM CITRATE + PO      Take 2,000 mg by mouth daily 2 tabs        carboxymethylcellulose 0.5 % Soln ophthalmic solution    REFRESH PLUS     1 drop 4 times daily        CLARINEX PO      Take by mouth daily Taking claritin        COMPAZINE PO      Take 10 mg by mouth daily as needed        cycloSPORINE 0.05 % ophthalmic emulsion    RESTASIS     Place 1 drop into both eyes every 12 hours        DAILY MULTIVITAMIN PO      Take 1 tablet by mouth daily.    Routine general medical examination at a health care facility       dexamethasone 4 MG tablet    DECADRON    28 tablet    Take 20mg (5 tablets) by mouth every week on the morning of velcade injection.    Multiple myeloma not having achieved remission (H)       erythromycin ophthalmic ointment    ROMYCIN     Place 1 Application into both eyes At Bedtime        lidocaine-prilocaine cream    EMLA    30 g    Apply topically as needed for moderate pain Apply dollop size amount to port site 30-60 min prior to accessing    Multiple myeloma not having achieved remission (H)       LORazepam 0.5 MG  tablet    ATIVAN    30 tablet    Take 1 tablet (0.5 mg) by mouth every 8 hours as needed for anxiety    Multiple myeloma not having achieved remission (H)       metoprolol succinate 50 MG 24 hr tablet    TOPROL-XL    270 tablet    TAKE 2 TABLETS BY MOUTH EVERY MORNING, AND 1 TABLET EVERY EVENING    Paroxysmal atrial fibrillation (H)       oxyCODONE IR 15 MG tablet    ROXICODONE    60 tablet    Take 1 tablet (15 mg) by mouth every 8 hours as needed for pain maximum 4 tablet(s) per day    Multiple myeloma not having achieved remission (H)       polyethylene glycol powder    MIRALAX/GLYCOLAX     Take 1 capful by mouth daily as needed    Bilateral leg edema       SIMBRINZA 1-0.2 % ophthalmic suspension   Generic drug:  brinzolamide-brimonidine      Place 1 drop into the right eye 2 times daily 1 drop AM and PM        triamcinolone 0.5 % cream    KENALOG    15 g    Apply sparingly to affected area three times daily. 1-2 weeks , as needed    Rash and nonspecific skin eruption       TYLENOL PO      Take 500 mg by mouth every 6 hours as needed for mild pain or fever        UNABLE TO FIND      MEDICATION NAME: Fresh Coat eye drops        VITAMIN D3 PO      Take 1,000 Units by mouth daily        warfarin 4 MG tablet    COUMADIN    110 tablet    Take 1-2 tablets daily as directed by INR clinic.    Paroxysmal atrial fibrillation (H), Long-term (current) use of anticoagulants       ZOMETA IV      Inject into the vein every 30 days Every 3 month dosing

## 2018-05-02 NOTE — MR AVS SNAPSHOT
Amira IVY Arreola   5/2/2018   Anticoagulation Therapy Visit    Description:  85 year old female   Provider:  Addy Frias MD   Department:  [facility]           INR as of 5/2/2018     Today's INR 1.77!      Anticoagulation Summary as of 5/2/2018     INR goal 2.0-3.0   Today's INR 1.77!   Full instructions 5/2: 6 mg; Otherwise 4 mg every day   Next INR check 5/9/2018    Indications   Long-term (current) use of anticoagulants [Z79.01] [Z79.01]  Atrial fibrillation (H) [I48.91] (Resolved) [I48.91]         May 2018 Details    Sun Mon Tue Wed Thu Fri Sat       1               2      6 mg   See details      3      4 mg         4      4 mg         5      4 mg           6      4 mg         7      4 mg         8      4 mg         9            10               11               12                 13               14               15               16               17               18               19                 20               21               22               23               24               25               26                 27               28               29               30               31                  Date Details   05/02 This INR check       Date of next INR:  5/9/2018         How to take your warfarin dose     To take:  4 mg Take 1 of the 4 mg tablets.    To take:  6 mg Take 1.5 of the 4 mg tablets.

## 2018-05-05 RX ORDER — METHYLPREDNISOLONE SODIUM SUCCINATE 125 MG/2ML
125 INJECTION, POWDER, LYOPHILIZED, FOR SOLUTION INTRAMUSCULAR; INTRAVENOUS
Status: CANCELLED
Start: 2018-05-23

## 2018-05-05 RX ORDER — DIPHENHYDRAMINE HYDROCHLORIDE 50 MG/ML
50 INJECTION INTRAMUSCULAR; INTRAVENOUS
Status: CANCELLED
Start: 2018-05-09

## 2018-05-05 RX ORDER — ALBUTEROL SULFATE 0.83 MG/ML
2.5 SOLUTION RESPIRATORY (INHALATION)
Status: CANCELLED | OUTPATIENT
Start: 2018-05-16

## 2018-05-05 RX ORDER — METHYLPREDNISOLONE SODIUM SUCCINATE 125 MG/2ML
125 INJECTION, POWDER, LYOPHILIZED, FOR SOLUTION INTRAMUSCULAR; INTRAVENOUS
Status: CANCELLED
Start: 2018-05-09

## 2018-05-05 RX ORDER — SODIUM CHLORIDE 9 MG/ML
1000 INJECTION, SOLUTION INTRAVENOUS CONTINUOUS PRN
Status: CANCELLED
Start: 2018-05-23

## 2018-05-05 RX ORDER — ALBUTEROL SULFATE 90 UG/1
1-2 AEROSOL, METERED RESPIRATORY (INHALATION)
Status: CANCELLED
Start: 2018-05-23

## 2018-05-05 RX ORDER — EPINEPHRINE 0.3 MG/.3ML
0.3 INJECTION SUBCUTANEOUS EVERY 5 MIN PRN
Status: CANCELLED | OUTPATIENT
Start: 2018-05-09

## 2018-05-05 RX ORDER — LORAZEPAM 2 MG/ML
0.5 INJECTION INTRAMUSCULAR EVERY 4 HOURS PRN
Status: CANCELLED
Start: 2018-05-09

## 2018-05-05 RX ORDER — DIPHENHYDRAMINE HYDROCHLORIDE 50 MG/ML
50 INJECTION INTRAMUSCULAR; INTRAVENOUS
Status: CANCELLED
Start: 2018-05-30

## 2018-05-05 RX ORDER — EPINEPHRINE 1 MG/ML
0.3 INJECTION, SOLUTION INTRAMUSCULAR; SUBCUTANEOUS EVERY 5 MIN PRN
Status: CANCELLED | OUTPATIENT
Start: 2018-05-30

## 2018-05-05 RX ORDER — DIPHENHYDRAMINE HYDROCHLORIDE 50 MG/ML
50 INJECTION INTRAMUSCULAR; INTRAVENOUS
Status: CANCELLED
Start: 2018-05-23

## 2018-05-05 RX ORDER — LORAZEPAM 2 MG/ML
0.5 INJECTION INTRAMUSCULAR EVERY 4 HOURS PRN
Status: CANCELLED
Start: 2018-05-30

## 2018-05-05 RX ORDER — LORAZEPAM 2 MG/ML
0.5 INJECTION INTRAMUSCULAR EVERY 4 HOURS PRN
Status: CANCELLED
Start: 2018-05-16

## 2018-05-05 RX ORDER — ALBUTEROL SULFATE 0.83 MG/ML
2.5 SOLUTION RESPIRATORY (INHALATION)
Status: CANCELLED | OUTPATIENT
Start: 2018-05-30

## 2018-05-05 RX ORDER — MEPERIDINE HYDROCHLORIDE 25 MG/ML
25 INJECTION INTRAMUSCULAR; INTRAVENOUS; SUBCUTANEOUS EVERY 30 MIN PRN
Status: CANCELLED | OUTPATIENT
Start: 2018-05-30

## 2018-05-05 RX ORDER — ALBUTEROL SULFATE 90 UG/1
1-2 AEROSOL, METERED RESPIRATORY (INHALATION)
Status: CANCELLED
Start: 2018-05-30

## 2018-05-05 RX ORDER — MEPERIDINE HYDROCHLORIDE 25 MG/ML
25 INJECTION INTRAMUSCULAR; INTRAVENOUS; SUBCUTANEOUS EVERY 30 MIN PRN
Status: CANCELLED | OUTPATIENT
Start: 2018-05-23

## 2018-05-05 RX ORDER — EPINEPHRINE 0.3 MG/.3ML
0.3 INJECTION SUBCUTANEOUS EVERY 5 MIN PRN
Status: CANCELLED | OUTPATIENT
Start: 2018-05-16

## 2018-05-05 RX ORDER — DIPHENHYDRAMINE HCL 25 MG
50 CAPSULE ORAL ONCE
Status: CANCELLED | OUTPATIENT
Start: 2018-05-09

## 2018-05-05 RX ORDER — DIPHENHYDRAMINE HYDROCHLORIDE 50 MG/ML
50 INJECTION INTRAMUSCULAR; INTRAVENOUS
Status: CANCELLED
Start: 2018-05-16

## 2018-05-05 RX ORDER — ALBUTEROL SULFATE 90 UG/1
1-2 AEROSOL, METERED RESPIRATORY (INHALATION)
Status: CANCELLED
Start: 2018-05-16

## 2018-05-05 RX ORDER — EPINEPHRINE 0.3 MG/.3ML
0.3 INJECTION SUBCUTANEOUS EVERY 5 MIN PRN
Status: CANCELLED | OUTPATIENT
Start: 2018-05-30

## 2018-05-05 RX ORDER — ALBUTEROL SULFATE 0.83 MG/ML
2.5 SOLUTION RESPIRATORY (INHALATION)
Status: CANCELLED | OUTPATIENT
Start: 2018-05-09

## 2018-05-05 RX ORDER — MEPERIDINE HYDROCHLORIDE 25 MG/ML
25 INJECTION INTRAMUSCULAR; INTRAVENOUS; SUBCUTANEOUS EVERY 30 MIN PRN
Status: CANCELLED | OUTPATIENT
Start: 2018-05-16

## 2018-05-05 RX ORDER — EPINEPHRINE 1 MG/ML
0.3 INJECTION, SOLUTION INTRAMUSCULAR; SUBCUTANEOUS EVERY 5 MIN PRN
Status: CANCELLED | OUTPATIENT
Start: 2018-05-16

## 2018-05-05 RX ORDER — SODIUM CHLORIDE 9 MG/ML
1000 INJECTION, SOLUTION INTRAVENOUS CONTINUOUS PRN
Status: CANCELLED
Start: 2018-05-16

## 2018-05-05 RX ORDER — HEPARIN SODIUM (PORCINE) LOCK FLUSH IV SOLN 100 UNIT/ML 100 UNIT/ML
5 SOLUTION INTRAVENOUS EVERY 8 HOURS
Status: CANCELLED
Start: 2018-05-16

## 2018-05-05 RX ORDER — ALBUTEROL SULFATE 0.83 MG/ML
2.5 SOLUTION RESPIRATORY (INHALATION)
Status: CANCELLED | OUTPATIENT
Start: 2018-05-23

## 2018-05-05 RX ORDER — SODIUM CHLORIDE 9 MG/ML
1000 INJECTION, SOLUTION INTRAVENOUS CONTINUOUS PRN
Status: CANCELLED
Start: 2018-05-09

## 2018-05-05 RX ORDER — EPINEPHRINE 1 MG/ML
0.3 INJECTION, SOLUTION INTRAMUSCULAR; SUBCUTANEOUS EVERY 5 MIN PRN
Status: CANCELLED | OUTPATIENT
Start: 2018-05-23

## 2018-05-05 RX ORDER — HEPARIN SODIUM (PORCINE) LOCK FLUSH IV SOLN 100 UNIT/ML 100 UNIT/ML
5 SOLUTION INTRAVENOUS EVERY 8 HOURS
Status: CANCELLED
Start: 2018-05-09

## 2018-05-05 RX ORDER — ACETAMINOPHEN 325 MG/1
650 TABLET ORAL ONCE
Status: CANCELLED | OUTPATIENT
Start: 2018-05-09

## 2018-05-05 RX ORDER — SODIUM CHLORIDE 9 MG/ML
1000 INJECTION, SOLUTION INTRAVENOUS CONTINUOUS PRN
Status: CANCELLED
Start: 2018-05-30

## 2018-05-05 RX ORDER — HEPARIN SODIUM (PORCINE) LOCK FLUSH IV SOLN 100 UNIT/ML 100 UNIT/ML
5 SOLUTION INTRAVENOUS EVERY 8 HOURS
Status: CANCELLED
Start: 2018-05-30

## 2018-05-05 RX ORDER — ALBUTEROL SULFATE 90 UG/1
1-2 AEROSOL, METERED RESPIRATORY (INHALATION)
Status: CANCELLED
Start: 2018-05-09

## 2018-05-05 RX ORDER — EPINEPHRINE 0.3 MG/.3ML
0.3 INJECTION SUBCUTANEOUS EVERY 5 MIN PRN
Status: CANCELLED | OUTPATIENT
Start: 2018-05-23

## 2018-05-05 RX ORDER — MEPERIDINE HYDROCHLORIDE 25 MG/ML
25 INJECTION INTRAMUSCULAR; INTRAVENOUS; SUBCUTANEOUS EVERY 30 MIN PRN
Status: CANCELLED | OUTPATIENT
Start: 2018-05-09

## 2018-05-05 RX ORDER — METHYLPREDNISOLONE SODIUM SUCCINATE 125 MG/2ML
125 INJECTION, POWDER, LYOPHILIZED, FOR SOLUTION INTRAMUSCULAR; INTRAVENOUS
Status: CANCELLED
Start: 2018-05-30

## 2018-05-05 RX ORDER — LORAZEPAM 2 MG/ML
0.5 INJECTION INTRAMUSCULAR EVERY 4 HOURS PRN
Status: CANCELLED
Start: 2018-05-23

## 2018-05-05 RX ORDER — EPINEPHRINE 1 MG/ML
0.3 INJECTION, SOLUTION INTRAMUSCULAR; SUBCUTANEOUS EVERY 5 MIN PRN
Status: CANCELLED | OUTPATIENT
Start: 2018-05-09

## 2018-05-05 RX ORDER — HEPARIN SODIUM (PORCINE) LOCK FLUSH IV SOLN 100 UNIT/ML 100 UNIT/ML
5 SOLUTION INTRAVENOUS EVERY 8 HOURS
Status: CANCELLED
Start: 2018-05-23

## 2018-05-05 RX ORDER — METHYLPREDNISOLONE SODIUM SUCCINATE 125 MG/2ML
125 INJECTION, POWDER, LYOPHILIZED, FOR SOLUTION INTRAMUSCULAR; INTRAVENOUS
Status: CANCELLED
Start: 2018-05-16

## 2018-05-09 ENCOUNTER — INFUSION THERAPY VISIT (OUTPATIENT)
Dept: INFUSION THERAPY | Facility: CLINIC | Age: 83
End: 2018-05-09
Attending: INTERNAL MEDICINE
Payer: MEDICARE

## 2018-05-09 ENCOUNTER — HOSPITAL ENCOUNTER (OUTPATIENT)
Facility: CLINIC | Age: 83
Setting detail: SPECIMEN
Discharge: HOME | End: 2018-05-09
Attending: INTERNAL MEDICINE | Admitting: INTERNAL MEDICINE
Payer: MEDICARE

## 2018-05-09 ENCOUNTER — ANTICOAGULATION THERAPY VISIT (OUTPATIENT)
Dept: FAMILY MEDICINE | Facility: CLINIC | Age: 83
End: 2018-05-09
Payer: COMMERCIAL

## 2018-05-09 ENCOUNTER — ONCOLOGY VISIT (OUTPATIENT)
Dept: ONCOLOGY | Facility: CLINIC | Age: 83
End: 2018-05-09
Attending: INTERNAL MEDICINE
Payer: MEDICARE

## 2018-05-09 VITALS
DIASTOLIC BLOOD PRESSURE: 88 MMHG | RESPIRATION RATE: 16 BRPM | HEART RATE: 98 BPM | TEMPERATURE: 98.2 F | WEIGHT: 146 LBS | HEIGHT: 66 IN | SYSTOLIC BLOOD PRESSURE: 141 MMHG | BODY MASS INDEX: 23.46 KG/M2 | OXYGEN SATURATION: 98 %

## 2018-05-09 VITALS
WEIGHT: 146 LBS | DIASTOLIC BLOOD PRESSURE: 88 MMHG | OXYGEN SATURATION: 98 % | HEART RATE: 98 BPM | RESPIRATION RATE: 16 BRPM | SYSTOLIC BLOOD PRESSURE: 141 MMHG | HEIGHT: 66 IN | TEMPERATURE: 98.2 F | BODY MASS INDEX: 23.46 KG/M2

## 2018-05-09 DIAGNOSIS — Z79.01 LONG-TERM (CURRENT) USE OF ANTICOAGULANTS: ICD-10-CM

## 2018-05-09 DIAGNOSIS — C90.00 MULTIPLE MYELOMA NOT HAVING ACHIEVED REMISSION (H): Primary | ICD-10-CM

## 2018-05-09 DIAGNOSIS — Z95.828 PORTACATH IN PLACE: ICD-10-CM

## 2018-05-09 DIAGNOSIS — I48.0 PAROXYSMAL ATRIAL FIBRILLATION (H): Primary | ICD-10-CM

## 2018-05-09 DIAGNOSIS — C90.00 MULTIPLE MYELOMA NOT HAVING ACHIEVED REMISSION (H): ICD-10-CM

## 2018-05-09 LAB
ALBUMIN SERPL-MCNC: 3.4 G/DL (ref 3.4–5)
ALP SERPL-CCNC: 47 U/L (ref 40–150)
ALT SERPL W P-5'-P-CCNC: 22 U/L (ref 0–50)
AST SERPL W P-5'-P-CCNC: 18 U/L (ref 0–45)
BASOPHILS # BLD AUTO: 0 10E9/L (ref 0–0.2)
BASOPHILS NFR BLD AUTO: 0.1 %
BILIRUB DIRECT SERPL-MCNC: 0.2 MG/DL (ref 0–0.2)
BILIRUB SERPL-MCNC: 0.6 MG/DL (ref 0.2–1.3)
DIFFERENTIAL METHOD BLD: ABNORMAL
EOSINOPHIL # BLD AUTO: 0 10E9/L (ref 0–0.7)
EOSINOPHIL NFR BLD AUTO: 0.3 %
ERYTHROCYTE [DISTWIDTH] IN BLOOD BY AUTOMATED COUNT: 14.6 % (ref 10–15)
HCT VFR BLD AUTO: 37.1 % (ref 35–47)
HGB BLD-MCNC: 12.3 G/DL (ref 11.7–15.7)
IMM GRANULOCYTES # BLD: 0 10E9/L (ref 0–0.4)
IMM GRANULOCYTES NFR BLD: 0.4 %
INR PPP: 2.45 (ref 0.86–1.14)
LYMPHOCYTES # BLD AUTO: 0.5 10E9/L (ref 0.8–5.3)
LYMPHOCYTES NFR BLD AUTO: 6.5 %
MCH RBC QN AUTO: 32.5 PG (ref 26.5–33)
MCHC RBC AUTO-ENTMCNC: 33.2 G/DL (ref 31.5–36.5)
MCV RBC AUTO: 98 FL (ref 78–100)
MONOCYTES # BLD AUTO: 0.2 10E9/L (ref 0–1.3)
MONOCYTES NFR BLD AUTO: 2.6 %
NEUTROPHILS # BLD AUTO: 6.9 10E9/L (ref 1.6–8.3)
NEUTROPHILS NFR BLD AUTO: 90.1 %
NRBC # BLD AUTO: 0 10*3/UL
NRBC BLD AUTO-RTO: 0 /100
PLATELET # BLD AUTO: 150 10E9/L (ref 150–450)
PROT SERPL-MCNC: 6 G/DL (ref 6.8–8.8)
RBC # BLD AUTO: 3.79 10E12/L (ref 3.8–5.2)
WBC # BLD AUTO: 7.7 10E9/L (ref 4–11)

## 2018-05-09 PROCEDURE — 83883 ASSAY NEPHELOMETRY NOT SPEC: CPT | Performed by: INTERNAL MEDICINE

## 2018-05-09 PROCEDURE — 85610 PROTHROMBIN TIME: CPT | Performed by: INTERNAL MEDICINE

## 2018-05-09 PROCEDURE — 96413 CHEMO IV INFUSION 1 HR: CPT

## 2018-05-09 PROCEDURE — 99207 ZZC NO CHARGE NURSE ONLY: CPT | Performed by: INTERNAL MEDICINE

## 2018-05-09 PROCEDURE — 80076 HEPATIC FUNCTION PANEL: CPT | Performed by: INTERNAL MEDICINE

## 2018-05-09 PROCEDURE — A9270 NON-COVERED ITEM OR SERVICE: HCPCS | Mod: GY | Performed by: INTERNAL MEDICINE

## 2018-05-09 PROCEDURE — 96367 TX/PROPH/DG ADDL SEQ IV INF: CPT

## 2018-05-09 PROCEDURE — 25000132 ZZH RX MED GY IP 250 OP 250 PS 637: Mod: GY | Performed by: INTERNAL MEDICINE

## 2018-05-09 PROCEDURE — 85025 COMPLETE CBC W/AUTO DIFF WBC: CPT | Performed by: INTERNAL MEDICINE

## 2018-05-09 PROCEDURE — 00000402 ZZHCL STATISTIC TOTAL PROTEIN: Performed by: INTERNAL MEDICINE

## 2018-05-09 PROCEDURE — 84165 PROTEIN E-PHORESIS SERUM: CPT | Performed by: INTERNAL MEDICINE

## 2018-05-09 PROCEDURE — 99214 OFFICE O/P EST MOD 30 MIN: CPT | Performed by: INTERNAL MEDICINE

## 2018-05-09 PROCEDURE — 25000128 H RX IP 250 OP 636: Performed by: INTERNAL MEDICINE

## 2018-05-09 PROCEDURE — G0463 HOSPITAL OUTPT CLINIC VISIT: HCPCS

## 2018-05-09 PROCEDURE — 96401 CHEMO ANTI-NEOPL SQ/IM: CPT

## 2018-05-09 PROCEDURE — 96415 CHEMO IV INFUSION ADDL HR: CPT

## 2018-05-09 RX ORDER — EPINEPHRINE 0.3 MG/.3ML
0.3 INJECTION SUBCUTANEOUS EVERY 5 MIN PRN
Status: CANCELLED | OUTPATIENT
Start: 2018-06-27

## 2018-05-09 RX ORDER — LORAZEPAM 2 MG/ML
0.5 INJECTION INTRAMUSCULAR EVERY 4 HOURS PRN
Status: CANCELLED
Start: 2018-06-13

## 2018-05-09 RX ORDER — SODIUM CHLORIDE 9 MG/ML
1000 INJECTION, SOLUTION INTRAVENOUS CONTINUOUS PRN
Status: CANCELLED
Start: 2018-06-20

## 2018-05-09 RX ORDER — ACETAMINOPHEN 325 MG/1
650 TABLET ORAL ONCE
Status: CANCELLED | OUTPATIENT
Start: 2018-06-06

## 2018-05-09 RX ORDER — ALBUTEROL SULFATE 90 UG/1
1-2 AEROSOL, METERED RESPIRATORY (INHALATION)
Status: CANCELLED
Start: 2018-06-20

## 2018-05-09 RX ORDER — LORAZEPAM 2 MG/ML
0.5 INJECTION INTRAMUSCULAR EVERY 4 HOURS PRN
Status: CANCELLED
Start: 2018-06-20

## 2018-05-09 RX ORDER — OXYCODONE HYDROCHLORIDE 15 MG/1
15 TABLET ORAL EVERY 8 HOURS PRN
Qty: 60 TABLET | Refills: 0 | Status: SHIPPED | OUTPATIENT
Start: 2018-05-09 | End: 2018-07-03

## 2018-05-09 RX ORDER — ACETAMINOPHEN 325 MG/1
650 TABLET ORAL ONCE
Status: COMPLETED | OUTPATIENT
Start: 2018-05-09 | End: 2018-05-09

## 2018-05-09 RX ORDER — DIPHENHYDRAMINE HCL 25 MG
50 CAPSULE ORAL ONCE
Status: COMPLETED | OUTPATIENT
Start: 2018-05-09 | End: 2018-05-09

## 2018-05-09 RX ORDER — EPINEPHRINE 0.3 MG/.3ML
0.3 INJECTION SUBCUTANEOUS EVERY 5 MIN PRN
Status: CANCELLED | OUTPATIENT
Start: 2018-06-13

## 2018-05-09 RX ORDER — LORAZEPAM 2 MG/ML
0.5 INJECTION INTRAMUSCULAR EVERY 4 HOURS PRN
Status: CANCELLED
Start: 2018-06-27

## 2018-05-09 RX ORDER — ALBUTEROL SULFATE 0.83 MG/ML
2.5 SOLUTION RESPIRATORY (INHALATION)
Status: CANCELLED | OUTPATIENT
Start: 2018-06-06

## 2018-05-09 RX ORDER — HEPARIN SODIUM (PORCINE) LOCK FLUSH IV SOLN 100 UNIT/ML 100 UNIT/ML
500 SOLUTION INTRAVENOUS EVERY 8 HOURS
Status: CANCELLED
Start: 2018-05-09

## 2018-05-09 RX ORDER — DIPHENHYDRAMINE HYDROCHLORIDE 50 MG/ML
50 INJECTION INTRAMUSCULAR; INTRAVENOUS
Status: CANCELLED
Start: 2018-06-06

## 2018-05-09 RX ORDER — EPINEPHRINE 1 MG/ML
0.3 INJECTION, SOLUTION INTRAMUSCULAR; SUBCUTANEOUS EVERY 5 MIN PRN
Status: CANCELLED | OUTPATIENT
Start: 2018-06-20

## 2018-05-09 RX ORDER — MEPERIDINE HYDROCHLORIDE 25 MG/ML
25 INJECTION INTRAMUSCULAR; INTRAVENOUS; SUBCUTANEOUS EVERY 30 MIN PRN
Status: CANCELLED | OUTPATIENT
Start: 2018-06-13

## 2018-05-09 RX ORDER — HEPARIN SODIUM (PORCINE) LOCK FLUSH IV SOLN 100 UNIT/ML 100 UNIT/ML
500 SOLUTION INTRAVENOUS EVERY 8 HOURS
Status: DISCONTINUED | OUTPATIENT
Start: 2018-05-09 | End: 2018-05-09 | Stop reason: HOSPADM

## 2018-05-09 RX ORDER — ALBUTEROL SULFATE 0.83 MG/ML
2.5 SOLUTION RESPIRATORY (INHALATION)
Status: CANCELLED | OUTPATIENT
Start: 2018-06-13

## 2018-05-09 RX ORDER — SODIUM CHLORIDE 9 MG/ML
1000 INJECTION, SOLUTION INTRAVENOUS CONTINUOUS PRN
Status: CANCELLED
Start: 2018-06-27

## 2018-05-09 RX ORDER — ALBUTEROL SULFATE 0.83 MG/ML
2.5 SOLUTION RESPIRATORY (INHALATION)
Status: CANCELLED | OUTPATIENT
Start: 2018-06-20

## 2018-05-09 RX ORDER — ALBUTEROL SULFATE 90 UG/1
1-2 AEROSOL, METERED RESPIRATORY (INHALATION)
Status: CANCELLED
Start: 2018-06-27

## 2018-05-09 RX ORDER — DEXAMETHASONE 4 MG/1
TABLET ORAL
Qty: 28 TABLET | Refills: 0 | Status: SHIPPED | OUTPATIENT
Start: 2018-05-09 | End: 2018-07-11

## 2018-05-09 RX ORDER — HEPARIN SODIUM (PORCINE) LOCK FLUSH IV SOLN 100 UNIT/ML 100 UNIT/ML
5 SOLUTION INTRAVENOUS EVERY 8 HOURS
Status: CANCELLED
Start: 2018-06-13

## 2018-05-09 RX ORDER — MEPERIDINE HYDROCHLORIDE 25 MG/ML
25 INJECTION INTRAMUSCULAR; INTRAVENOUS; SUBCUTANEOUS EVERY 30 MIN PRN
Status: CANCELLED | OUTPATIENT
Start: 2018-06-20

## 2018-05-09 RX ORDER — DIPHENHYDRAMINE HCL 25 MG
50 CAPSULE ORAL ONCE
Status: CANCELLED | OUTPATIENT
Start: 2018-06-06

## 2018-05-09 RX ORDER — ALBUTEROL SULFATE 0.83 MG/ML
2.5 SOLUTION RESPIRATORY (INHALATION)
Status: CANCELLED | OUTPATIENT
Start: 2018-06-27

## 2018-05-09 RX ORDER — EPINEPHRINE 1 MG/ML
0.3 INJECTION, SOLUTION INTRAMUSCULAR; SUBCUTANEOUS EVERY 5 MIN PRN
Status: CANCELLED | OUTPATIENT
Start: 2018-06-13

## 2018-05-09 RX ORDER — METHYLPREDNISOLONE SODIUM SUCCINATE 125 MG/2ML
125 INJECTION, POWDER, LYOPHILIZED, FOR SOLUTION INTRAMUSCULAR; INTRAVENOUS
Status: CANCELLED
Start: 2018-06-27

## 2018-05-09 RX ORDER — METHYLPREDNISOLONE SODIUM SUCCINATE 125 MG/2ML
125 INJECTION, POWDER, LYOPHILIZED, FOR SOLUTION INTRAMUSCULAR; INTRAVENOUS
Status: CANCELLED
Start: 2018-06-20

## 2018-05-09 RX ORDER — ALBUTEROL SULFATE 90 UG/1
1-2 AEROSOL, METERED RESPIRATORY (INHALATION)
Status: CANCELLED
Start: 2018-06-06

## 2018-05-09 RX ORDER — SODIUM CHLORIDE 9 MG/ML
1000 INJECTION, SOLUTION INTRAVENOUS CONTINUOUS PRN
Status: CANCELLED
Start: 2018-06-13

## 2018-05-09 RX ORDER — DIPHENHYDRAMINE HYDROCHLORIDE 50 MG/ML
50 INJECTION INTRAMUSCULAR; INTRAVENOUS
Status: CANCELLED
Start: 2018-06-13

## 2018-05-09 RX ORDER — METHYLPREDNISOLONE SODIUM SUCCINATE 125 MG/2ML
125 INJECTION, POWDER, LYOPHILIZED, FOR SOLUTION INTRAMUSCULAR; INTRAVENOUS
Status: CANCELLED
Start: 2018-06-13

## 2018-05-09 RX ORDER — HEPARIN SODIUM (PORCINE) LOCK FLUSH IV SOLN 100 UNIT/ML 100 UNIT/ML
5 SOLUTION INTRAVENOUS EVERY 8 HOURS
Status: CANCELLED
Start: 2018-06-27

## 2018-05-09 RX ORDER — EPINEPHRINE 1 MG/ML
0.3 INJECTION, SOLUTION INTRAMUSCULAR; SUBCUTANEOUS EVERY 5 MIN PRN
Status: CANCELLED | OUTPATIENT
Start: 2018-06-27

## 2018-05-09 RX ORDER — DIPHENHYDRAMINE HYDROCHLORIDE 50 MG/ML
50 INJECTION INTRAMUSCULAR; INTRAVENOUS
Status: CANCELLED
Start: 2018-06-20

## 2018-05-09 RX ORDER — EPINEPHRINE 0.3 MG/.3ML
0.3 INJECTION SUBCUTANEOUS EVERY 5 MIN PRN
Status: CANCELLED | OUTPATIENT
Start: 2018-06-20

## 2018-05-09 RX ORDER — SODIUM CHLORIDE 9 MG/ML
1000 INJECTION, SOLUTION INTRAVENOUS CONTINUOUS PRN
Status: CANCELLED
Start: 2018-06-06

## 2018-05-09 RX ORDER — EPINEPHRINE 0.3 MG/.3ML
0.3 INJECTION SUBCUTANEOUS EVERY 5 MIN PRN
Status: CANCELLED | OUTPATIENT
Start: 2018-06-06

## 2018-05-09 RX ORDER — MEPERIDINE HYDROCHLORIDE 25 MG/ML
25 INJECTION INTRAMUSCULAR; INTRAVENOUS; SUBCUTANEOUS EVERY 30 MIN PRN
Status: CANCELLED | OUTPATIENT
Start: 2018-06-27

## 2018-05-09 RX ORDER — HEPARIN SODIUM (PORCINE) LOCK FLUSH IV SOLN 100 UNIT/ML 100 UNIT/ML
5 SOLUTION INTRAVENOUS EVERY 8 HOURS
Status: CANCELLED
Start: 2018-06-20

## 2018-05-09 RX ORDER — DIPHENHYDRAMINE HYDROCHLORIDE 50 MG/ML
50 INJECTION INTRAMUSCULAR; INTRAVENOUS
Status: CANCELLED
Start: 2018-06-27

## 2018-05-09 RX ORDER — METHYLPREDNISOLONE SODIUM SUCCINATE 125 MG/2ML
125 INJECTION, POWDER, LYOPHILIZED, FOR SOLUTION INTRAMUSCULAR; INTRAVENOUS
Status: CANCELLED
Start: 2018-06-06

## 2018-05-09 RX ORDER — HEPARIN SODIUM (PORCINE) LOCK FLUSH IV SOLN 100 UNIT/ML 100 UNIT/ML
5 SOLUTION INTRAVENOUS EVERY 8 HOURS
Status: CANCELLED
Start: 2018-06-06

## 2018-05-09 RX ORDER — ALBUTEROL SULFATE 90 UG/1
1-2 AEROSOL, METERED RESPIRATORY (INHALATION)
Status: CANCELLED
Start: 2018-06-13

## 2018-05-09 RX ORDER — EPINEPHRINE 1 MG/ML
0.3 INJECTION, SOLUTION INTRAMUSCULAR; SUBCUTANEOUS EVERY 5 MIN PRN
Status: CANCELLED | OUTPATIENT
Start: 2018-06-06

## 2018-05-09 RX ORDER — MEPERIDINE HYDROCHLORIDE 25 MG/ML
25 INJECTION INTRAMUSCULAR; INTRAVENOUS; SUBCUTANEOUS EVERY 30 MIN PRN
Status: CANCELLED | OUTPATIENT
Start: 2018-06-06

## 2018-05-09 RX ORDER — LORAZEPAM 2 MG/ML
0.5 INJECTION INTRAMUSCULAR EVERY 4 HOURS PRN
Status: CANCELLED
Start: 2018-06-06

## 2018-05-09 RX ADMIN — DARATUMUMAB 1000 MG: 100 INJECTION, SOLUTION, CONCENTRATE INTRAVENOUS at 10:10

## 2018-05-09 RX ADMIN — DIPHENHYDRAMINE HYDROCHLORIDE 50 MG: 25 CAPSULE ORAL at 09:37

## 2018-05-09 RX ADMIN — ACETAMINOPHEN 650 MG: 325 TABLET ORAL at 09:37

## 2018-05-09 RX ADMIN — HEPARIN 500 UNITS: 100 SYRINGE at 13:31

## 2018-05-09 RX ADMIN — DEXAMETHASONE SODIUM PHOSPHATE 12 MG: 10 INJECTION, SOLUTION INTRAMUSCULAR; INTRAVENOUS at 09:40

## 2018-05-09 RX ADMIN — BORTEZOMIB 2.3 MG: 3.5 INJECTION, POWDER, LYOPHILIZED, FOR SOLUTION INTRAVENOUS; SUBCUTANEOUS at 10:12

## 2018-05-09 RX ADMIN — SODIUM CHLORIDE 250 ML: 9 INJECTION, SOLUTION INTRAVENOUS at 09:38

## 2018-05-09 ASSESSMENT — PAIN SCALES - GENERAL
PAINLEVEL: NO PAIN (0)
PAINLEVEL: NO PAIN (0)

## 2018-05-09 NOTE — MR AVS SNAPSHOT
Amira Arreola   5/9/2018   Anticoagulation Therapy Visit    Description:  85 year old female   Provider:  Addy Frias MD   Department:  [facility]           INR as of 5/9/2018     Today's INR 2.45      Anticoagulation Summary as of 5/9/2018     INR goal 2.0-3.0   Today's INR 2.45   Full instructions 5/9: 6 mg; Otherwise 4 mg every day   Next INR check 5/16/2018    Indications   Long-term (current) use of anticoagulants [Z79.01] [Z79.01]  Atrial fibrillation (H) [I48.91] (Resolved) [I48.91]         May 2018 Details    Sun Mon Tue Wed Thu Fri Sat       1               2               3               4               5                 6               7               8               9      6 mg   See details      10      4 mg         11      4 mg         12      4 mg           13      4 mg         14      4 mg         15      4 mg         16            17               18               19                 20               21               22               23               24               25               26                 27               28               29               30               31                  Date Details   05/09 This INR check       Date of next INR:  5/16/2018         How to take your warfarin dose     To take:  4 mg Take 1 of the 4 mg tablets.    To take:  6 mg Take 1.5 of the 4 mg tablets.

## 2018-05-09 NOTE — PROGRESS NOTES
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     1. On 09/21/2015, WBC of 4.2, hemoglobin of 13.2 and platelets of 138.    -On 09/29/2015, SPEP does not reveal any M-spike.   -On 10/02/2015, JANET does not reveal any monoclonal protein.     -On 10/22/2015, urine immunofixation reveals monoclonal free kappa light chain.    2. On 05/11/2016, kappa light chain of 50, lambda light chain of 0.32 and ratio of kappa to lambda of 156.2.  3. Bone marrow biopsy on 05/25/2016 reveals 40-50% kappa light chain restricted plasma cells.  Cytogenetics is normal. FISH panel reveals translocation 11;14.    4. MRI of bones on 06/21/2016 and 06/22/2016 reveals myeloma lesions.  5. On 08/24/2016, she was started on revlimid with dexamethasone 20 mg weekly. She did not have any significant response to treatment.   6. Velcade and dexamethasone started on 03/21/2017.    7. Daratumumab added on 05/31/2017.        SUBJECTIVE:    Mrs. Arreola is an 85-year-old female with kappa free light chain multiple myeloma.  She is on Velcade, dexamethasone, and daratumumab.  Her disease has been responding.  Last kappa free light chain on 4/11/2018 was 2.0.      Patient has chronic back pain.  She mainly points to the upper back.  She takes oxycodone as needed.      Last week, she fell down. She did not have any head trauma.  She had some bruising on the leg.  She is walking good.      No headache.  No dizziness.  No chest pain.  No shortness of breath.  No nausea or vomiting.  Some constipation.  No urinary complaints.  No bleeding.  No fever or chills.      Patient has peripheral neuropathy.  She gets some numbness in the fingers.  It is intermittent.     PHYSICAL EXAMINATION:   Alert and oriented x 3.   EYES:  No icterus.   THROAT:  No ulcer or thrush.   NECK:  Supple. No lymphadenopathy.   AXILLAE:  No lymphadenopathy.   LUNGS:  Good air entry bilaterally.  No  crackles or wheezing.   HEART:  Regular.  No murmur.   ABDOMEN:  Soft and nontender.  No mass.   EXTREMITIES: Bilateral pedal edema. No calf swelling or tenderness.   SKIN: No petechia.      LABORATORY DATA:  Reviewed.      ASSESSMENT:   1.  An 85-year-old female with kappa free light chain multiple myeloma.   2.  Chronic back pain.   3.  Peripheral neuropathy.   4.  Fatigue.   5.  Fall about a week ago.      PLAN:   1.  Patient overall is stable from myeloma.  Labs were all reviewed.      Patient is on treatment with Velcade, dexamethasone, and daratumumab.  She is tolerating it well.  She is developing some neuropathy.  I told her it will get worse. It is mild now and she will continue on the same treatment.      I told the patient that if she wants, we can change Velcade to pomalidomide.  That way, she has to come only once a month to the clinic for infusion. Patient says that she likes the Velcade.  She has been tolerating it well.  She does not mind coming to the clinic once a week.  We will continue on the same treatment.      2.  Patient has chronic back pain.  Prescription of oxycodone refilled.     3.  Patient gets Zometa every 3 months. It will be continued.  She does not have any dental or jaw related problem.     4.  I will see her in a month.  Advised her to see a physician sooner if she has any worsening bone pain, bone fracture, infection, worsening weakness, or any other concerns.         ITZ LOPEZ MD             D: 2018   T: 2018   MT: ROXANA      Name:     ALBERTO PARSON   MRN:      [id number]        Account:      [id number]   :      1932           Visit Date:   2018      Document: [id number]

## 2018-05-09 NOTE — PROGRESS NOTES
Infusion Nursing Note:  Amira IVY Roseon presents today for C13D1 daratumumab, velcade.    Patient seen by provider today: Yes: Dr. Roberts   present during visit today: Not Applicable.    Note: N/A.    Intravenous Access:  Labs drawn without difficulty.  Implanted Port.    Treatment Conditions:  Lab Results   Component Value Date    HGB 12.3 05/09/2018     Lab Results   Component Value Date    WBC 7.7 05/09/2018      Lab Results   Component Value Date    ANEU 6.9 05/09/2018     Lab Results   Component Value Date     05/09/2018      Lab Results   Component Value Date     06/13/2017                   Lab Results   Component Value Date    POTASSIUM 4.0 06/13/2017           No results found for: MAG         Lab Results   Component Value Date    CR 0.66 04/25/2018                   Lab Results   Component Value Date    BRADLEY 8.8 04/25/2018                Lab Results   Component Value Date    BILITOTAL 0.6 05/09/2018           Lab Results   Component Value Date    ALBUMIN 3.4 05/09/2018                    Lab Results   Component Value Date    ALT 22 05/09/2018           Lab Results   Component Value Date    AST 18 05/09/2018       Results reviewed, labs MET treatment parameters, ok to proceed with treatment.      Post Infusion Assessment:  Patient tolerated infusion without incident.  Blood return noted pre and post infusion.  Site patent and intact, free from redness, edema or discomfort.  No evidence of extravasations.  Access discontinued per protocol.    Discharge Plan:   Patient declined prescription refills.  Discharge instructions reviewed with: Patient.  Patient and/or family verbalized understanding of discharge instructions and all questions answered.  Copy of AVS reviewed with patient and/or family.  Patient will return 5/16/18 for next appointment.  Patient discharged in stable condition accompanied by: self.  Departure Mode: Ambulatory.    Fanny Bob RN

## 2018-05-09 NOTE — PROGRESS NOTES
"Oncology Rooming Note    May 9, 2018 8:47 AM   Amira Arreola is a 85 year old female who presents for:    Chief Complaint   Patient presents with     Oncology Clinic Visit     Multiple myeloma not having achieved remission      Initial Vitals: /88  Pulse 98  Temp 98.2  F (36.8  C) (Oral)  Resp 16  Ht 1.664 m (5' 5.51\")  Wt 66.2 kg (146 lb)  SpO2 98%  BMI 23.92 kg/m2 Estimated body mass index is 23.92 kg/(m^2) as calculated from the following:    Height as of this encounter: 1.664 m (5' 5.51\").    Weight as of this encounter: 66.2 kg (146 lb). Body surface area is 1.75 meters squared.  No Pain (0) Comment: Data Unavailable   No LMP recorded. Patient is postmenopausal.  Allergies reviewed: Yes  Medications reviewed: Yes    Medications: MEDICATION REFILL NEEDED ON OXYODONE AND DEXAMTHASONE  Pharmacy name entered into [facility]: [facility] [address] [place] [address] 7 [place] [facility] [place] [address]    Clinical concerns: None                      4 minutes for nursing intake (face to face time)     Zora Bentley MA            "

## 2018-05-09 NOTE — MR AVS SNAPSHOT
After Visit Summary   5/9/2018    Amira Arreola    MRN: [id number]           Patient Information     Date Of Birth          7/17/1932        Visit Information        Provider Department      5/9/2018 8:30 AM SH INFUSION CHAIR 9 [facility]        Today's Diagnoses     Paroxysmal atrial fibrillation (H)    -  1    Multiple myeloma not having achieved remission (H)           Follow-ups after your visit        Your next 10 appointments already scheduled     May 16, 2018  9:00 AM CDT   Level 2 with SH INFUSION CHAIR 17   [facility] ([facility])    [facility]  [address]  [place] [address]   [phone]            May 23, 2018 10:00 AM CDT   Level 2 with SH INFUSION CHAIR 18   [facility] ([facility])    [facility]  [address]  [place] [address]   [phone]            May 30, 2018  9:30 AM CDT   Level 2 with SH INFUSION CHAIR 9   [facility] ([facility])    [facility]  [address]  [place] [address]   [phone]            Jun 06, 2018  9:30 AM CDT   Level 5 with SH INFUSION CHAIR 10   [facility] ([facility])    [facility]  [address]  [place] [address]   [phone]            Jun 06, 2018 10:00 AM CDT   Return Visit with Shayne Roberts MD   [facility])    [facility]  [address]  [place] [address]   [phone]            Jun 13, 2018  9:30 AM CDT   Level 2 with SH INFUSION CHAIR 9   [facility] ([facility])    [facility]  [address]  [place] [address]  "  [phone]              Who to contact     If you have questions or need follow up information about today's clinic visit or your schedule please contact [facility] directly at [phone].  Normal or non-critical lab and imaging results will be communicated to you by Innographyhart, letter or phone within 4 business days after the clinic has received the results. If you do not hear from us within 7 days, please contact the clinic through Innographyhart or phone. If you have a critical or abnormal lab result, we will notify you by phone as soon as possible.  Submit refill requests through Caktus or call your pharmacy and they will forward the refill request to us. Please allow 3 business days for your refill to be completed.          Additional Information About Your Visit        Innographyhart Information     Caktus lets you send messages to your doctor, view your test results, renew your prescriptions, schedule appointments and more. To sign up, go to [email or web address] . Click on \"Log in\" on the left side of the screen, which will take you to the Welcome page. Then click on \"Sign up Now\" on the right side of the page.     You will be asked to enter the access code listed below, as well as some personal information. Please follow the directions to create your username and password.     Your access code is: [email or web address]  Expires: 7/3/2018  3:28 PM     Your access code will  in 90 days. If you need help or a new code, please call your [place] clinic or [phone].        Care EveryWhere ID     This is your Care EveryWhere ID. This could be used by other organizations to access your [place] medical records  [id number]        Your Vitals Were     Pulse Temperature Respirations Height Pulse Oximetry BMI (Body Mass Index)    98 98.2  F (36.8  C) (Oral) 16 1.664 m (5' 5.5\") 98% 23.93 kg/m2       Blood Pressure from Last 3 Encounters:   18 141/88   18 141/88   18 " 135/80    Weight from Last 3 Encounters:   05/09/18 66.2 kg (146 lb)   05/09/18 66.2 kg (146 lb)   05/02/18 67 kg (147 lb 9.6 oz)              We Performed the Following     CBC with platelets differential     Hepatic panel     INR     Kappa and lambda light chain     Protein electrophoresis          Where to get your medicines      Some of these will need a paper prescription and others can be bought over the counter.  Ask your nurse if you have questions.     Bring a paper prescription for each of these medications     oxyCODONE IR 15 MG tablet          Primary Care Provider Office Phone # Fax #    Addy Sean Frias [phone] [address] [facility] [address]  [place] [address]        Equal Access to Services     SARA ZELAYA : Hadii liane Galloway, watanya gutierrez, qaybta kaalmada alonso, hung dominique . So [facility] [phone].    ATENCIÓN: Si habla español, tiene a barlow disposición servicios gratuitos de asistencia lingüística. [facility] [phone].    We comply with applicable federal civil rights laws and [place] laws. We do not discriminate on the basis of race, color, national origin, age, disability, sex, sexual orientation, or gender identity.            Thank you!     Thank you for choosing [facility]  for your care. Our goal is always to provide you with excellent care. Hearing back from our patients is one way we can continue to improve our services. Please take a few minutes to complete the written survey that you may receive in the mail after your visit with us. Thank you!             Your Updated Medication List - Protect others around you: Learn how to safely use, store and throw away your medicines at www.disposemymeds.org.          This list is accurate as of 5/9/18  9:44 AM.  Always use your most recent med list.                   Brand Name Dispense Instructions for use Diagnosis    acyclovir 400 MG tablet     ZOVIRAX    60 tablet    Take 1 tablet (400 mg) by mouth 2 times daily Start 1 week prior to daratumumab and continue for 3 months after treatment is complete.    Multiple myeloma not having achieved remission (H)       CALCIUM CITRATE + PO      Take 2,000 mg by mouth daily 2 tabs        carboxymethylcellulose 0.5 % Soln ophthalmic solution    REFRESH PLUS     1 drop 4 times daily        CLARINEX PO      Take by mouth daily Taking claritin        COMPAZINE PO      Take 10 mg by mouth daily as needed        cycloSPORINE 0.05 % ophthalmic emulsion    RESTASIS     Place 1 drop into both eyes every 12 hours        DAILY MULTIVITAMIN PO      Take 1 tablet by mouth daily.    Routine general medical examination at a health care facility       dexamethasone 4 MG tablet    DECADRON    28 tablet    Take 20mg (5 tablets) by mouth every week on the morning of velcade injection.    Multiple myeloma not having achieved remission (H)       erythromycin ophthalmic ointment    ROMYCIN     Place 1 Application into both eyes At Bedtime        lidocaine-prilocaine cream    EMLA    30 g    Apply topically as needed for moderate pain Apply dollop size amount to port site 30-60 min prior to accessing    Multiple myeloma not having achieved remission (H)       LORazepam 0.5 MG tablet    ATIVAN    30 tablet    Take 1 tablet (0.5 mg) by mouth every 8 hours as needed for anxiety    Multiple myeloma not having achieved remission (H)       metoprolol succinate 50 MG 24 hr tablet    TOPROL-XL    270 tablet    TAKE 2 TABLETS BY MOUTH EVERY MORNING, AND 1 TABLET EVERY EVENING    Paroxysmal atrial fibrillation (H)       oxyCODONE IR 15 MG tablet    ROXICODONE    60 tablet    Take 1 tablet (15 mg) by mouth every 8 hours as needed for pain maximum 4 tablet(s) per day    Multiple myeloma not having achieved remission (H)       polyethylene glycol powder    MIRALAX/GLYCOLAX     Take 1 capful by mouth daily as needed    Bilateral leg edema        SIMBRINZA 1-0.2 % ophthalmic suspension   Generic drug:  brinzolamide-brimonidine      Place 1 drop into the right eye 2 times daily 1 drop AM and PM        triamcinolone 0.5 % cream    KENALOG    15 g    Apply sparingly to affected area three times daily. 1-2 weeks , as needed    Rash and nonspecific skin eruption       TYLENOL PO      Take 500 mg by mouth every 6 hours as needed for mild pain or fever        UNABLE TO FIND      MEDICATION NAME: Fresh Coat eye drops        VITAMIN D3 PO      Take 1,000 Units by mouth daily        warfarin 4 MG tablet    COUMADIN    110 tablet    TAKE 1-2 TABLETS BY MOUTH EVERY DAY AS DIRECTED BY INR CLINIC    Paroxysmal atrial fibrillation (H), Long-term (current) use of anticoagulants       ZOMETA IV      Inject into the vein every 30 days Every 3 month dosing

## 2018-05-09 NOTE — PROGRESS NOTES
ANTICOAGULATION FOLLOW-UP CLINIC VISIT    Patient Name:  Amira Arreola  Date:  5/9/2018  Contact Type:  Telephone    SUBJECTIVE:     Patient Findings     Positives No Problem Findings           OBJECTIVE    INR   Date Value Ref Range Status   05/09/2018 2.45 (H) 0.86 - 1.14 Final       ASSESSMENT / PLAN  INR assessment THER    Recheck INR In: 1 WEEK    INR Location Outside lab      Anticoagulation Summary as of 5/9/2018     INR goal 2.0-3.0   Today's INR 2.45   Maintenance plan 4 mg (4 mg x 1) every day   Full instructions 5/9: 6 mg; Otherwise 4 mg every day   Weekly total 28 mg   Plan last modified Tigist Corral RN (4/4/2018)   Next INR check 5/16/2018   Target end date Indefinite    Indications   Long-term (current) use of anticoagulants [Z79.01] [Z79.01]  Atrial fibrillation (H) [I48.91] (Resolved) [I48.91]         Anticoagulation Episode Summary     INR check location     Preferred lab     Send INR reminders to CS ANTICOAGULATION    Comments patient have standing INR order to be draw at infusion visit.  advise to call [facility] when have INR draw for dosing instruction.  patient can be reach at home phone [phone]      Anticoagulation Care Providers     Provider Role Specialty Phone number    Addy Frias MD Responsible Internal Medicine [phone]            See the Encounter Report to view Anticoagulation Flowsheet and Dosing Calendar (Go to Encounters tab in chart review, and find the Anticoagulation Therapy Visit)    Dosage adjustment made based on physician directed care plan.  Left detailed VM for patient with instructions to continue 6mg Wed and 4mg SUSAN Pinon RN

## 2018-05-09 NOTE — LETTER
"    5/9/2018         RE: Amira Arreola  [address]  [place] [address]        Dear Colleague,    Thank you for referring your patient, Amira Arreola, to the [facility]. Please see a copy of my visit note below.    Oncology Rooming Note    May 9, 2018 8:47 AM   Amira Arreola is a 85 year old female who presents for:    Chief Complaint   Patient presents with     Oncology Clinic Visit     Multiple myeloma not having achieved remission      Initial Vitals: /88  Pulse 98  Temp 98.2  F (36.8  C) (Oral)  Resp 16  Ht 1.664 m (5' 5.51\")  Wt 66.2 kg (146 lb)  SpO2 98%  BMI 23.92 kg/m2 Estimated body mass index is 23.92 kg/(m^2) as calculated from the following:    Height as of this encounter: 1.664 m (5' 5.51\").    Weight as of this encounter: 66.2 kg (146 lb). Body surface area is 1.75 meters squared.  No Pain (0) Comment: Data Unavailable   No LMP recorded. Patient is postmenopausal.  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     1. On 09/21/2015, WBC of 4.2, hemoglobin of 13.2 and platelets of 138.    -On 09/29/2015, SPEP does not reveal any M-spike.   -On 10/02/2015, JANET does not reveal any monoclonal protein.     -On 10/22/2015, urine immunofixation reveals monoclonal free kappa light chain.    2. On 05/11/2016, kappa light chain of 50, lambda light chain of 0.32 and ratio of kappa to lambda of 156.2.  3. Bone marrow biopsy on 05/25/2016 reveals 40-50% kappa light chain restricted plasma cells.  Cytogenetics is " normal. FISH panel reveals translocation 11;14.    4. MRI of bones on 06/21/2016 and 06/22/2016 reveals myeloma lesions.  5. On 08/24/2016, she was started on revlimid with dexamethasone 20 mg weekly. She did not have any significant response to treatment.   6. Velcade and dexamethasone started on 03/21/2017.    7. Daratumumab added on 05/31/2017.        SUBJECTIVE:    Mrs. Arreola is an 85-year-old female with kappa free light chain multiple myeloma.  She is on Velcade, dexamethasone, and daratumumab.  Her disease has been responding.  Last kappa free light chain on 4/11/2018 was 2.0.      Patient has chronic back pain.  She mainly points to the upper back.  She takes oxycodone as needed.      Last week, she fell down. She did not have any head trauma.  She had some bruising on the leg.  She is walking good.      No headache.  No dizziness.  No chest pain.  No shortness of breath.  No nausea or vomiting.  Some constipation.  No urinary complaints.  No bleeding.  No fever or chills.      Patient has peripheral neuropathy.  She gets some numbness in the fingers.  It is intermittent.     PHYSICAL EXAMINATION:   Alert and oriented x 3.   EYES:  No icterus.   THROAT:  No ulcer or thrush.   NECK:  Supple. No lymphadenopathy.   AXILLAE:  No lymphadenopathy.   LUNGS:  Good air entry bilaterally.  No crackles or wheezing.   HEART:  Regular.  No murmur.   ABDOMEN:  Soft and nontender.  No mass.   EXTREMITIES: Bilateral pedal edema. No calf swelling or tenderness.   SKIN: No petechia.      LABORATORY DATA:  Reviewed.      ASSESSMENT:   1.  An 85-year-old female with kappa free light chain multiple myeloma.   2.  Chronic back pain.   3.  Peripheral neuropathy.   4.  Fatigue.   5.  Fall about a week ago.      PLAN:   1.  Patient overall is stable from myeloma.  Labs were all reviewed.      Patient is on treatment with Velcade, dexamethasone, and daratumumab.  She is tolerating it well.  She is developing some neuropathy.  I told  her it will get worse. It is mild now and she will continue on the same treatment.      I told the patient that if she wants, we can change Velcade to pomalidomide.  That way, she has to come only once a month to the clinic for infusion. Patient says that she likes the Velcade.  She has been tolerating it well.  She does not mind coming to the clinic once a week.  We will continue on the same treatment.      2.  Patient has chronic back pain.  Prescription of oxycodone refilled.     3.  Patient gets Zometa every 3 months. It will be continued.  She does not have any dental or jaw related problem.     4.  I will see her in a month.  Advised her to see a physician sooner if she has any worsening bone pain, bone fracture, infection, worsening weakness, or any other concerns.         ITZ LOPEZ MD             D: 2018   T: 2018   MT: ROXANA      Name:     ALBERTO PARSON   MRN:      [id number]        Account:      [id number]   :      1932           Visit Date:   2018      Document: [id number]        Again, thank you for allowing me to participate in the care of your patient.        Sincerely,        Itz Lopez MD

## 2018-05-09 NOTE — MR AVS SNAPSHOT
After Visit Summary   5/9/2018    Amira Arreola    MRN: [id number]           Patient Information     Date Of Birth          7/17/1932        Visit Information        Provider Department      5/9/2018 9:20 AM Shayne Roberts MD [facility]        Today's Diagnoses     Multiple myeloma not having achieved remission (H)    -  1      Care Instructions    Continue chemotherapy.  Scheduled - Sarah  Follow up in 1 month.   Scheduled - Sarah    AVS given to patient - Sarah          Follow-ups after your visit        Your next 10 appointments already scheduled     May 23, 2018 10:00 AM CDT   Level 2 with SH INFUSION CHAIR 18   [facility] ([facility])    [facility]  [address]  [place] [address]   [phone]            May 30, 2018  9:30 AM CDT   Level 2 with SH INFUSION CHAIR 9   [facility] ([facility])    [facility]  [address]  [place] [address]   [phone]            Jun 06, 2018  9:30 AM CDT   Level 5 with SH INFUSION CHAIR 10   [facility] ([facility])    [facility]  [address]  [place] [address]   [phone]            Jun 06, 2018 10:00 AM CDT   Return Visit with Shayne Roberts MD   [facility] ([facility])    [facility]  [address]  [place] [address]   [phone]            Jun 13, 2018  9:30 AM CDT   Level 2 with SH INFUSION CHAIR 9   [facility] ([facility])    [facility]  [address]  [place] [address]   [phone]              Who to contact     If you have questions or need follow up information about today's clinic visit or your schedule please contact [facility]  "CLINIC directly at [phone].  Normal or non-critical lab and imaging results will be communicated to you by MyChart, letter or phone within 4 business days after the clinic has received the results. If you do not hear from us within 7 days, please contact the clinic through LoggedInhart or phone. If you have a critical or abnormal lab result, we will notify you by phone as soon as possible.  Submit refill requests through Zamzee or call your pharmacy and they will forward the refill request to us. Please allow 3 business days for your refill to be completed.          Additional Information About Your Visit        LoggedInharAnokion SA Information     Zamzee lets you send messages to your doctor, view your test results, renew your prescriptions, schedule appointments and more. To sign up, go to [email or web address] . Click on \"Log in\" on the left side of the screen, which will take you to the Welcome page. Then click on \"Sign up Now\" on the right side of the page.     You will be asked to enter the access code listed below, as well as some personal information. Please follow the directions to create your username and password.     Your access code is: [email or web address]  Expires: 7/3/2018  3:28 PM     Your access code will  in 90 days. If you need help or a new code, please call your [facility] clinic or [phone].        Care EveryWhere ID     This is your Care EveryWhere ID. This could be used by other organizations to access your [facility] medical records  [id number]        Your Vitals Were     Pulse Temperature Respirations Height Pulse Oximetry BMI (Body Mass Index)    98 98.2  F (36.8  C) (Oral) 16 1.664 m (5' 5.51\") 98% 23.92 kg/m2       Blood Pressure from Last 3 Encounters:   18 123/71   18 141/88   18 141/88    Weight from Last 3 Encounters:   18 68 kg (150 lb)   18 66.2 kg (146 lb)   18 66.2 kg (146 lb)              Today, you had the following     No orders found for display    "      Today's Medication Changes          These changes are accurate as of 5/9/18 11:59 PM.  If you have any questions, ask your nurse or doctor.               These medicines have changed or have updated prescriptions.        Dose/Directions    * dexamethasone 4 MG tablet   Commonly known as:  DECADRON   This may have changed:  Another medication with the same name was added. Make sure you understand how and when to take each.   Used for:  Multiple myeloma not having achieved remission (H)        Take 20mg (5 tablets) by mouth every week on the morning of velcade injection.   Quantity:  28 tablet   Refills:  0       * dexamethasone 4 MG tablet   Commonly known as:  DECADRON   This may have changed:  You were already taking a medication with the same name, and this prescription was added. Make sure you understand how and when to take each.   Used for:  Multiple myeloma not having achieved remission (H)        Take 20mg (5 tablets) by mouth every week on the morning of velcade injection.   Quantity:  28 tablet   Refills:  0       * Notice:  This list has 2 medication(s) that are the same as other medications prescribed for you. Read the directions carefully, and ask your doctor or other care provider to review them with you.         Where to get your medicines      These medications were sent to [facility] [id number]  [address], [place] [address] AT [facility] [address] [facility] [address]  [address] [place] [address], [place] [address]     Phone:  [phone]     dexamethasone 4 MG tablet         Some of these will need a paper prescription and others can be bought over the counter.  Ask your nurse if you have questions.     Bring a paper prescription for each of these medications     oxyCODONE IR 15 MG tablet               Information about OPIOIDS     PRESCRIPTION OPIOIDS: WHAT YOU NEED TO KNOW   You have a prescription for an opioid (narcotic) pain medicine. Opioids can cause addiction. If you have a history of chemical dependency  of any type, you are at a higher risk of becoming addicted to opioids. Only take this medicine after all other options have been tried. Take it for as short a time and as few doses as possible.     Do not:    Drive. If you drive while taking these medicines, you could be arrested for driving under the influence (DUI).    Operate heavy machinery    Do any other dangerous activities while taking these medicines.     Drink any alcohol while taking these medicines.      Take with any other medicines that contain acetaminophen. Read all labels carefully. Look for the word  acetaminophen  or  Tylenol.  Ask your pharmacist if you have questions or are unsure.    Store your pills in a secure place, locked if possible. We will not replace any lost or stolen medicine. If you don t finish your medicine, please throw away (dispose) as directed by your pharmacist. The [place] Pollution Control Agency has more information about safe disposal: https://www.pca.[facility].[place].us/living-green/managing-unwanted-medications    All opioids tend to cause constipation. Drink plenty of water and eat foods that have a lot of fiber, such as fruits, vegetables, prune juice, apple juice and high-fiber cereal. Take a laxative (Miralax, milk of magnesia, Colace, Senna) if you don t move your bowels at least every other day.          Primary Care Provider Office Phone # Fax #    Addy Frias [phone] [address] [address]        Equal Access to Services     [facility] : Hadii liane murcia hadasho Soyair, waaxda luqadaha, qaybta kaalmada adesergioyada, hung dominique . So [facility] [phone].    ATENCIÓN: Si habla español, tiene a barlow disposición servicios gratuitos de asistencia lingüística. Llame al [phone].    We comply with applicable federal civil rights laws and [place] laws. We do not discriminate on the basis of race, color, national origin, age, disability, sex,  sexual orientation, or gender identity.            Thank you!     Thank you for choosing [facility]  for your care. Our goal is always to provide you with excellent care. Hearing back from our patients is one way we can continue to improve our services. Please take a few minutes to complete the written survey that you may receive in the mail after your visit with us. Thank you!             Your Updated Medication List - Protect others around you: Learn how to safely use, store and throw away your medicines at www.disposemymeds.org.          This list is accurate as of 5/9/18 11:59 PM.  Always use your most recent med list.                   Brand Name Dispense Instructions for use Diagnosis    acyclovir 400 MG tablet    ZOVIRAX    60 tablet    Take 1 tablet (400 mg) by mouth 2 times daily Start 1 week prior to daratumumab and continue for 3 months after treatment is complete.    Multiple myeloma not having achieved remission (H)       CALCIUM CITRATE + PO      Take 2,000 mg by mouth daily 2 tabs        carboxymethylcellulose 0.5 % Soln ophthalmic solution    REFRESH PLUS     1 drop 4 times daily        CLARINEX PO      Take by mouth daily Taking claritin        COMPAZINE PO      Take 10 mg by mouth daily as needed        cycloSPORINE 0.05 % ophthalmic emulsion    RESTASIS     Place 1 drop into both eyes every 12 hours        DAILY MULTIVITAMIN PO      Take 1 tablet by mouth daily.    Routine general medical examination at a health care facility       * dexamethasone 4 MG tablet    DECADRON    28 tablet    Take 20mg (5 tablets) by mouth every week on the morning of velcade injection.    Multiple myeloma not having achieved remission (H)       * dexamethasone 4 MG tablet    DECADRON    28 tablet    Take 20mg (5 tablets) by mouth every week on the morning of velcade injection.    Multiple myeloma not having achieved remission (H)       erythromycin ophthalmic ointment    ROMYCIN     Place 1 Application  into both eyes At Bedtime        lidocaine-prilocaine cream    EMLA    30 g    Apply topically as needed for moderate pain Apply dollop size amount to port site 30-60 min prior to accessing    Multiple myeloma not having achieved remission (H)       LORazepam 0.5 MG tablet    ATIVAN    30 tablet    Take 1 tablet (0.5 mg) by mouth every 8 hours as needed for anxiety    Multiple myeloma not having achieved remission (H)       metoprolol succinate 50 MG 24 hr tablet    TOPROL-XL    270 tablet    TAKE 2 TABLETS BY MOUTH EVERY MORNING, AND 1 TABLET EVERY EVENING    Paroxysmal atrial fibrillation (H)       oxyCODONE IR 15 MG tablet    ROXICODONE    60 tablet    Take 1 tablet (15 mg) by mouth every 8 hours as needed for pain maximum 4 tablet(s) per day    Multiple myeloma not having achieved remission (H)       polyethylene glycol powder    MIRALAX/GLYCOLAX     Take 1 capful by mouth daily as needed    Bilateral leg edema       SIMBRINZA 1-0.2 % ophthalmic suspension   Generic drug:  brinzolamide-brimonidine      Place 1 drop into the right eye 2 times daily 1 drop AM and PM        triamcinolone 0.5 % cream    KENALOG    15 g    Apply sparingly to affected area three times daily. 1-2 weeks , as needed    Rash and nonspecific skin eruption       TYLENOL PO      Take 500 mg by mouth every 6 hours as needed for mild pain or fever        UNABLE TO FIND      MEDICATION NAME: Fresh Coat eye drops        VITAMIN D3 PO      Take 1,000 Units by mouth daily        warfarin 4 MG tablet    COUMADIN    110 tablet    TAKE 1-2 TABLETS BY MOUTH EVERY DAY AS DIRECTED BY INR CLINIC    Paroxysmal atrial fibrillation (H), Long-term (current) use of anticoagulants       ZOMETA IV      Inject into the vein every 30 days Every 3 month dosing        * Notice:  This list has 2 medication(s) that are the same as other medications prescribed for you. Read the directions carefully, and ask your doctor or other care provider to review them with you.

## 2018-05-10 LAB
ALBUMIN SERPL ELPH-MCNC: 3.7 G/DL (ref 3.7–5.1)
ALPHA1 GLOB SERPL ELPH-MCNC: 0.3 G/DL (ref 0.2–0.4)
ALPHA2 GLOB SERPL ELPH-MCNC: 0.7 G/DL (ref 0.5–0.9)
B-GLOBULIN SERPL ELPH-MCNC: 0.6 G/DL (ref 0.6–1)
GAMMA GLOB SERPL ELPH-MCNC: 0.2 G/DL (ref 0.7–1.6)
M PROTEIN SERPL ELPH-MCNC: 0.1 G/DL
PROT PATTERN SERPL ELPH-IMP: ABNORMAL

## 2018-05-11 LAB
KAPPA LC UR-MCNC: 2.82 MG/DL (ref 0.33–1.94)
KAPPA LC/LAMBDA SER: ABNORMAL {RATIO} (ref 0.26–1.65)
LAMBDA LC SERPL-MCNC: <0.25 MG/DL (ref 0.57–2.63)

## 2018-05-16 ENCOUNTER — ANTICOAGULATION THERAPY VISIT (OUTPATIENT)
Dept: FAMILY MEDICINE | Facility: CLINIC | Age: 83
End: 2018-05-16
Payer: MEDICARE

## 2018-05-16 ENCOUNTER — INFUSION THERAPY VISIT (OUTPATIENT)
Dept: INFUSION THERAPY | Facility: CLINIC | Age: 83
End: 2018-05-16
Attending: INTERNAL MEDICINE
Payer: MEDICARE

## 2018-05-16 ENCOUNTER — HOSPITAL ENCOUNTER (OUTPATIENT)
Facility: CLINIC | Age: 83
Setting detail: SPECIMEN
Discharge: HOME | End: 2018-05-16
Attending: INTERNAL MEDICINE | Admitting: INTERNAL MEDICINE
Payer: MEDICARE

## 2018-05-16 VITALS
SYSTOLIC BLOOD PRESSURE: 123 MMHG | TEMPERATURE: 98.5 F | RESPIRATION RATE: 14 BRPM | DIASTOLIC BLOOD PRESSURE: 71 MMHG | HEART RATE: 77 BPM | HEIGHT: 66 IN | WEIGHT: 150 LBS | BODY MASS INDEX: 24.11 KG/M2

## 2018-05-16 DIAGNOSIS — C90.00 MULTIPLE MYELOMA NOT HAVING ACHIEVED REMISSION (H): Primary | ICD-10-CM

## 2018-05-16 DIAGNOSIS — Z95.828 PORTACATH IN PLACE: ICD-10-CM

## 2018-05-16 DIAGNOSIS — I48.0 PAROXYSMAL ATRIAL FIBRILLATION (H): ICD-10-CM

## 2018-05-16 LAB
BASOPHILS # BLD AUTO: 0 10E9/L (ref 0–0.2)
BASOPHILS NFR BLD AUTO: 0 %
DIFFERENTIAL METHOD BLD: ABNORMAL
EOSINOPHIL # BLD AUTO: 0 10E9/L (ref 0–0.7)
EOSINOPHIL NFR BLD AUTO: 0.3 %
ERYTHROCYTE [DISTWIDTH] IN BLOOD BY AUTOMATED COUNT: 14.7 % (ref 10–15)
HCT VFR BLD AUTO: 35.5 % (ref 35–47)
HGB BLD-MCNC: 11.7 G/DL (ref 11.7–15.7)
IMM GRANULOCYTES # BLD: 0 10E9/L (ref 0–0.4)
IMM GRANULOCYTES NFR BLD: 0.3 %
INR PPP: 2.53 (ref 0.86–1.14)
LYMPHOCYTES # BLD AUTO: 0.4 10E9/L (ref 0.8–5.3)
LYMPHOCYTES NFR BLD AUTO: 6.2 %
MCH RBC QN AUTO: 32.4 PG (ref 26.5–33)
MCHC RBC AUTO-ENTMCNC: 33 G/DL (ref 31.5–36.5)
MCV RBC AUTO: 98 FL (ref 78–100)
MONOCYTES # BLD AUTO: 0.2 10E9/L (ref 0–1.3)
MONOCYTES NFR BLD AUTO: 2.3 %
NEUTROPHILS # BLD AUTO: 6 10E9/L (ref 1.6–8.3)
NEUTROPHILS NFR BLD AUTO: 90.9 %
NRBC # BLD AUTO: 0 10*3/UL
NRBC BLD AUTO-RTO: 0 /100
PLATELET # BLD AUTO: 127 10E9/L (ref 150–450)
RBC # BLD AUTO: 3.61 10E12/L (ref 3.8–5.2)
WBC # BLD AUTO: 6.6 10E9/L (ref 4–11)

## 2018-05-16 PROCEDURE — 85610 PROTHROMBIN TIME: CPT | Performed by: INTERNAL MEDICINE

## 2018-05-16 PROCEDURE — 85025 COMPLETE CBC W/AUTO DIFF WBC: CPT | Performed by: INTERNAL MEDICINE

## 2018-05-16 PROCEDURE — 25000128 H RX IP 250 OP 636: Performed by: INTERNAL MEDICINE

## 2018-05-16 PROCEDURE — 96401 CHEMO ANTI-NEOPL SQ/IM: CPT

## 2018-05-16 RX ORDER — HEPARIN SODIUM (PORCINE) LOCK FLUSH IV SOLN 100 UNIT/ML 100 UNIT/ML
500 SOLUTION INTRAVENOUS EVERY 8 HOURS
Status: CANCELLED
Start: 2018-05-16

## 2018-05-16 RX ORDER — HEPARIN SODIUM (PORCINE) LOCK FLUSH IV SOLN 100 UNIT/ML 100 UNIT/ML
500 SOLUTION INTRAVENOUS EVERY 8 HOURS
Status: DISCONTINUED | OUTPATIENT
Start: 2018-05-16 | End: 2018-05-16 | Stop reason: HOSPADM

## 2018-05-16 RX ADMIN — BORTEZOMIB 2.3 MG: 3.5 INJECTION, POWDER, LYOPHILIZED, FOR SOLUTION INTRAVENOUS; SUBCUTANEOUS at 10:33

## 2018-05-16 RX ADMIN — HEPARIN 500 UNITS: 100 SYRINGE at 09:25

## 2018-05-16 ASSESSMENT — PAIN SCALES - GENERAL: PAINLEVEL: NO PAIN (0)

## 2018-05-16 NOTE — PROGRESS NOTES
"  ANTICOAGULATION FOLLOW-UP CLINIC VISIT    Patient Name:  Amira Arreola  Date:  5/16/2018  Contact Type:  Telephone    SUBJECTIVE:     Patient Findings     Positives No Problem Findings           OBJECTIVE    INR   Date Value Ref Range Status   05/16/2018 2.53 (H) 0.86 - 1.14 Final       ASSESSMENT / PLAN  INR assessment THER    Recheck INR In: 1 WEEK    INR Location Outside lab      Anticoagulation Summary as of 5/16/2018     INR goal 2.0-3.0   Today's INR 2.53   Maintenance plan 4 mg (4 mg x 1) every day   Full instructions 4 mg every day   Weekly total 28 mg   No change documented Tigist Corral, RN   Plan last modified Tigist Corral, RN (4/4/2018)   Next INR check 5/30/2018   Target end date Indefinite    Indications   Long-term (current) use of anticoagulants [Z79.01] [Z79.01]  Atrial fibrillation (H) [I48.91] (Resolved) [I48.91]         Anticoagulation Episode Summary     INR check location     Preferred lab     Send INR reminders to [facility] ANTICOAGULATION    Comments patient have standing INR order to be draw at infusion visit.  advise to call  [facility] when have INR draw for dosing instruction.  patient can be reach at home phone [phone]      Anticoagulation Care Providers     Provider Role Specialty Phone number    Addy Frias MD [facility] [phone]            See the Encounter Report to view Anticoagulation Flowsheet and Dosing Calendar (Go to Encounters tab in chart review, and find the Anticoagulation Therapy Visit)    Dosage adjustment made based on physician directed care plan. Result note from . Patient had INR at oncology infusion today:  2.5.  Continue SAME dosing and recheck one week. (could go longer if she wishes).  She confirmed that she \"only takes 4 mg per day\" (did not take 6 mg last Wed.per flow sheet; I corrected flow sheet to 4 mg)    Tigist Corral RN               "

## 2018-05-16 NOTE — MR AVS SNAPSHOT
After Visit Summary   5/16/2018    Amira Arreola    MRN: [id number]           Patient Information     Date Of Birth          7/17/1932        Visit Information        Provider Department      5/16/2018 9:00 AM SH INFUSION CHAIR 17 [facility]        Today's Diagnoses     Multiple myeloma not having achieved remission (H)    -  1    Paroxysmal atrial fibrillation (H)        Portacath in place           Follow-ups after your visit        Follow-up notes from your care team     Return in 7 days (on 5/23/2018).      Your next 10 appointments already scheduled     May 23, 2018 10:00 AM CDT   Level 2 with SH INFUSION CHAIR 18   [facility] ([facility])    [facility]  [address]  [place] [address]   [phone]            May 30, 2018  9:30 AM CDT   Level 2 with SH INFUSION CHAIR 9   [facility] ([facility])    [facility] [place] [id number]  [place] [address]   [phone]            Jun 06, 2018  9:30 AM CDT   Level 5 with SH INFUSION CHAIR 10   [facility] ([facility])    [facility]  [address]  [place] [address]   [phone]            Jun 06, 2018 10:00 AM CDT   Return Visit with Shayne Roberts MD   [facility]    [facility]  [address]  [place] [address]   [phone]            Jun 13, 2018  9:30 AM CDT   Level 2 with SH INFUSION CHAIR 9   [facility] ([facility])    [facility]  [address]  [place] [address]   [phone]              Who to contact     If you have questions or need follow up information about today's clinic visit or your schedule please  "contact [facility] directly at [phone].  Normal or non-critical lab and imaging results will be communicated to you by MyChart, letter or phone within 4 business days after the clinic has received the results. If you do not hear from us within 7 days, please contact the clinic through MyChart or phone. If you have a critical or abnormal lab result, we will notify you by phone as soon as possible.  Submit refill requests through Mowbly or call your pharmacy and they will forward the refill request to us. Please allow 3 business days for your refill to be completed.          Additional Information About Your Visit        CINEPASSharIpropertyz Information     Mowbly lets you send messages to your doctor, view your test results, renew your prescriptions, schedule appointments and more. To sign up, go to [email or web address] . Click on \"Log in\" on the left side of the screen, which will take you to the Welcome page. Then click on \"Sign up Now\" on the right side of the page.     You will be asked to enter the access code listed below, as well as some personal information. Please follow the directions to create your username and password.     Your access code is: [email or web address]  Expires: 7/3/2018  3:28 PM     Your access code will  in 90 days. If you need help or a new code, please call your [facility] clinic or [phone].        Care EveryWhere ID     This is your Care EveryWhere ID. This could be used by other organizations to access your [facility] medical records  [id number]        Your Vitals Were     Pulse Temperature Respirations Height BMI (Body Mass Index)       77 98.5  F (36.9  C) (Oral) 14 1.664 m (5' 5.51\") 24.57 kg/m2        Blood Pressure from Last 3 Encounters:   18 123/71   18 141/88   18 141/88    Weight from Last 3 Encounters:   18 68 kg (150 lb)   18 66.2 kg (146 lb)   18 66.2 kg (146 lb)              We Performed the Following     " CBC with platelets differential     INR        Primary Care Provider Office Phone # Fax #    Addy Frias [phone] [address] [address]        Equal Access to Services     SARA ZELAYA : Hadii aad ku yvetteo Sojaylonali, waaxda luqadaha, qaybta kaalmada adesergioyada, hung khnan blanca diego laMirnacesar sadler. So [facility] [phone].    ATENCIÓN: Si habla español, tiene a barlow disposición servicios gratuitos de asistencia lingüística. Llame al [phone].    We comply with applicable federal civil rights laws and [place] laws. We do not discriminate on the basis of race, color, national origin, age, disability, sex, sexual orientation, or gender identity.            Thank you!     Thank you for choosing [facility] AND [facility]  for your care. Our goal is always to provide you with excellent care. Hearing back from our patients is one way we can continue to improve our services. Please take a few minutes to complete the written survey that you may receive in the mail after your visit with us. Thank you!             Your Updated Medication List - Protect others around you: Learn how to safely use, store and throw away your medicines at www.disposemymeds.org.          This list is accurate as of 5/16/18 10:39 AM.  Always use your most recent med list.                   Brand Name Dispense Instructions for use Diagnosis    acyclovir 400 MG tablet    ZOVIRAX    60 tablet    Take 1 tablet (400 mg) by mouth 2 times daily Start 1 week prior to daratumumab and continue for 3 months after treatment is complete.    Multiple myeloma not having achieved remission (H)       CALCIUM CITRATE + PO      Take 2,000 mg by mouth daily 2 tabs        carboxymethylcellulose 0.5 % Soln ophthalmic solution    REFRESH PLUS     1 drop 4 times daily        CLARINEX PO      Take by mouth daily Taking claritin        COMPAZINE PO      Take 10 mg by mouth daily as needed         cycloSPORINE 0.05 % ophthalmic emulsion    RESTASIS     Place 1 drop into both eyes every 12 hours        DAILY MULTIVITAMIN PO      Take 1 tablet by mouth daily.    Routine general medical examination at a health care facility       * dexamethasone 4 MG tablet    DECADRON    28 tablet    Take 20mg (5 tablets) by mouth every week on the morning of velcade injection.    Multiple myeloma not having achieved remission (H)       * dexamethasone 4 MG tablet    DECADRON    28 tablet    Take 20mg (5 tablets) by mouth every week on the morning of velcade injection.    Multiple myeloma not having achieved remission (H)       erythromycin ophthalmic ointment    ROMYCIN     Place 1 Application into both eyes At Bedtime        lidocaine-prilocaine cream    EMLA    30 g    Apply topically as needed for moderate pain Apply dollop size amount to port site 30-60 min prior to accessing    Multiple myeloma not having achieved remission (H)       LORazepam 0.5 MG tablet    ATIVAN    30 tablet    Take 1 tablet (0.5 mg) by mouth every 8 hours as needed for anxiety    Multiple myeloma not having achieved remission (H)       metoprolol succinate 50 MG 24 hr tablet    TOPROL-XL    270 tablet    TAKE 2 TABLETS BY MOUTH EVERY MORNING, AND 1 TABLET EVERY EVENING    Paroxysmal atrial fibrillation (H)       oxyCODONE IR 15 MG tablet    ROXICODONE    60 tablet    Take 1 tablet (15 mg) by mouth every 8 hours as needed for pain maximum 4 tablet(s) per day    Multiple myeloma not having achieved remission (H)       polyethylene glycol powder    MIRALAX/GLYCOLAX     Take 1 capful by mouth daily as needed    Bilateral leg edema       SIMBRINZA 1-0.2 % ophthalmic suspension   Generic drug:  brinzolamide-brimonidine      Place 1 drop into the right eye 2 times daily 1 drop AM and PM        triamcinolone 0.5 % cream    KENALOG    15 g    Apply sparingly to affected area three times daily. 1-2 weeks , as needed    Rash and nonspecific skin eruption        TYLENOL PO      Take 500 mg by mouth every 6 hours as needed for mild pain or fever        UNABLE TO FIND      MEDICATION NAME: Fresh Coat eye drops        VITAMIN D3 PO      Take 1,000 Units by mouth daily        warfarin 4 MG tablet    COUMADIN    110 tablet    TAKE 1-2 TABLETS BY MOUTH EVERY DAY AS DIRECTED BY INR CLINIC    Paroxysmal atrial fibrillation (H), Long-term (current) use of anticoagulants       ZOMETA IV      Inject into the vein every 30 days Every 3 month dosing        * Notice:  This list has 2 medication(s) that are the same as other medications prescribed for you. Read the directions carefully, and ask your doctor or other care provider to review them with you.

## 2018-05-16 NOTE — PROGRESS NOTES
Infusion Nursing Note:  Amira Arreola presents today for Velcade .    Patient seen by provider today: No   present during visit today: Not Applicable.    Note: Slept poorly last night no specific reason..  Pts INR Dr Mcginnis nurse to call her regarding direction of her INR level  Intravenous Access:  Labs drawn without difficulty.  Implanted Port.    Treatment Conditions:  Lab Results   Component Value Date    HGB 11.7 05/16/2018     Lab Results   Component Value Date    WBC 6.6 05/16/2018      Lab Results   Component Value Date    ANEU 6.0 05/16/2018     Lab Results   Component Value Date     05/16/2018      Lab Results   Component Value Date     06/13/2017                   Lab Results   Component Value Date    POTASSIUM 4.0 06/13/2017           No results found for: MAG         Lab Results   Component Value Date    CR 0.66 04/25/2018                   Lab Results   Component Value Date    BRADLEY 8.8 04/25/2018                Lab Results   Component Value Date    BILITOTAL 0.6 05/09/2018           Lab Results   Component Value Date    ALBUMIN 3.4 05/09/2018                    Lab Results   Component Value Date    ALT 22 05/09/2018           Lab Results   Component Value Date    AST 18 05/09/2018       Results reviewed, labs MET treatment parameters, ok to proceed with treatment.      Post Infusion Assessment:  Patient tolerated injection without incident.  Site patent and intact, free from redness, edema or discomfort.  No evidence of extravasations.  Access discontinued per protocol.    Discharge Plan:   Discharge instructions reviewed with: Patient.  Patient and/or family verbalized understanding of discharge instructions and all questions answered.  Copy of AVS reviewed with patient and/or family.  Patient will return 5/23/2018 for next appointment.  Patient discharged in stable condition accompanied by: self.  Departure Mode: Ambulatory.    Thelma Acosta RN

## 2018-05-16 NOTE — MR AVS SNAPSHOT
Amira Arreola   5/16/2018   Anticoagulation Therapy Visit    Description:  85 year old female   Provider:  Addy Frias MD   Department:  [facility]           INR as of 5/16/2018     Today's INR 2.53      Anticoagulation Summary as of 5/16/2018     INR goal 2.0-3.0   Today's INR 2.53   Full instructions 4 mg every day   Next INR check 5/30/2018    Indications   Long-term (current) use of anticoagulants [Z79.01] [Z79.01]  Atrial fibrillation (H) [I48.91] (Resolved) [I48.91]         May 2018 Details    Sun Mon Tue Wed Thu Fri Sat       1               2               3               4               5                 6               7               8               9               10               11               12                 13               14               15               16      4 mg   See details      17      4 mg         18      4 mg         19      4 mg           20      4 mg         21      4 mg         22      4 mg         23      4 mg         24      4 mg         25      4 mg         26      4 mg           27      4 mg         28      4 mg         29      4 mg         30            31                  Date Details   05/16 This INR check       Date of next INR:  5/30/2018         How to take your warfarin dose     To take:  4 mg Take 1 of the 4 mg tablets.

## 2018-05-23 ENCOUNTER — HOSPITAL ENCOUNTER (OUTPATIENT)
Facility: CLINIC | Age: 83
Setting detail: SPECIMEN
Discharge: HOME | End: 2018-05-23
Attending: INTERNAL MEDICINE | Admitting: INTERNAL MEDICINE
Payer: MEDICARE

## 2018-05-23 ENCOUNTER — INFUSION THERAPY VISIT (OUTPATIENT)
Dept: INFUSION THERAPY | Facility: CLINIC | Age: 83
End: 2018-05-23
Attending: INTERNAL MEDICINE
Payer: MEDICARE

## 2018-05-23 ENCOUNTER — ANTICOAGULATION THERAPY VISIT (OUTPATIENT)
Dept: FAMILY MEDICINE | Facility: CLINIC | Age: 83
End: 2018-05-23
Payer: COMMERCIAL

## 2018-05-23 VITALS
HEART RATE: 81 BPM | WEIGHT: 152.6 LBS | BODY MASS INDEX: 24.53 KG/M2 | SYSTOLIC BLOOD PRESSURE: 131 MMHG | HEIGHT: 66 IN | OXYGEN SATURATION: 96 % | DIASTOLIC BLOOD PRESSURE: 75 MMHG | RESPIRATION RATE: 16 BRPM | TEMPERATURE: 98.4 F

## 2018-05-23 DIAGNOSIS — C90.00 MULTIPLE MYELOMA NOT HAVING ACHIEVED REMISSION (H): Primary | ICD-10-CM

## 2018-05-23 DIAGNOSIS — I48.0 PAROXYSMAL ATRIAL FIBRILLATION (H): ICD-10-CM

## 2018-05-23 DIAGNOSIS — Z95.828 PORTACATH IN PLACE: ICD-10-CM

## 2018-05-23 DIAGNOSIS — Z79.01 LONG-TERM (CURRENT) USE OF ANTICOAGULANTS: ICD-10-CM

## 2018-05-23 LAB
BASOPHILS # BLD AUTO: 0 10E9/L (ref 0–0.2)
BASOPHILS NFR BLD AUTO: 0.1 %
DIFFERENTIAL METHOD BLD: ABNORMAL
EOSINOPHIL # BLD AUTO: 0 10E9/L (ref 0–0.7)
EOSINOPHIL NFR BLD AUTO: 0.1 %
ERYTHROCYTE [DISTWIDTH] IN BLOOD BY AUTOMATED COUNT: 14.7 % (ref 10–15)
HCT VFR BLD AUTO: 36.5 % (ref 35–47)
HGB BLD-MCNC: 12.1 G/DL (ref 11.7–15.7)
IMM GRANULOCYTES # BLD: 0 10E9/L (ref 0–0.4)
IMM GRANULOCYTES NFR BLD: 0.2 %
INR PPP: 1.89 (ref 0.86–1.14)
LYMPHOCYTES # BLD AUTO: 0.4 10E9/L (ref 0.8–5.3)
LYMPHOCYTES NFR BLD AUTO: 4.5 %
MCH RBC QN AUTO: 32.7 PG (ref 26.5–33)
MCHC RBC AUTO-ENTMCNC: 33.2 G/DL (ref 31.5–36.5)
MCV RBC AUTO: 99 FL (ref 78–100)
MONOCYTES # BLD AUTO: 0.2 10E9/L (ref 0–1.3)
MONOCYTES NFR BLD AUTO: 2.5 %
NEUTROPHILS # BLD AUTO: 7.5 10E9/L (ref 1.6–8.3)
NEUTROPHILS NFR BLD AUTO: 92.6 %
NRBC # BLD AUTO: 0 10*3/UL
NRBC BLD AUTO-RTO: 0 /100
PLATELET # BLD AUTO: 133 10E9/L (ref 150–450)
RBC # BLD AUTO: 3.7 10E12/L (ref 3.8–5.2)
WBC # BLD AUTO: 8.1 10E9/L (ref 4–11)

## 2018-05-23 PROCEDURE — 25000128 H RX IP 250 OP 636: Performed by: INTERNAL MEDICINE

## 2018-05-23 PROCEDURE — 85610 PROTHROMBIN TIME: CPT | Performed by: INTERNAL MEDICINE

## 2018-05-23 PROCEDURE — 85025 COMPLETE CBC W/AUTO DIFF WBC: CPT | Performed by: INTERNAL MEDICINE

## 2018-05-23 PROCEDURE — 99207 ZZC NO CHARGE NURSE ONLY: CPT | Performed by: INTERNAL MEDICINE

## 2018-05-23 PROCEDURE — 96401 CHEMO ANTI-NEOPL SQ/IM: CPT

## 2018-05-23 RX ORDER — HEPARIN SODIUM (PORCINE) LOCK FLUSH IV SOLN 100 UNIT/ML 100 UNIT/ML
500 SOLUTION INTRAVENOUS EVERY 8 HOURS
Status: CANCELLED
Start: 2018-05-23

## 2018-05-23 RX ORDER — HEPARIN SODIUM (PORCINE) LOCK FLUSH IV SOLN 100 UNIT/ML 100 UNIT/ML
500 SOLUTION INTRAVENOUS EVERY 8 HOURS
Status: DISCONTINUED | OUTPATIENT
Start: 2018-05-23 | End: 2018-05-23 | Stop reason: HOSPADM

## 2018-05-23 RX ADMIN — BORTEZOMIB 2.3 MG: 3.5 INJECTION, POWDER, LYOPHILIZED, FOR SOLUTION INTRAVENOUS; SUBCUTANEOUS at 10:46

## 2018-05-23 RX ADMIN — HEPARIN 500 UNITS: 100 SYRINGE at 10:48

## 2018-05-23 ASSESSMENT — PAIN SCALES - GENERAL: PAINLEVEL: NO PAIN (0)

## 2018-05-23 NOTE — PROGRESS NOTES
ANTICOAGULATION FOLLOW-UP CLINIC VISIT    Patient Name:  Amira Arreola  Date:  5/23/2018  Contact Type:  Telephone/ Dose instructions given to patient    SUBJECTIVE:     Patient Findings     Positives No Problem Findings           OBJECTIVE    INR   Date Value Ref Range Status   05/23/2018 1.89 (H) 0.86 - 1.14 Final       ASSESSMENT / PLAN  INR assessment SUB    Recheck INR In: 1 WEEK    INR Location Outside lab      Anticoagulation Summary as of 5/23/2018     INR goal 2.0-3.0   Today's INR No new INR was available at the time of this encounter.   Warfarin maintenance plan 4 mg (4 mg x 1) every day   Full warfarin instructions 5/23: 6 mg; Otherwise 4 mg every day   Weekly warfarin total 28 mg   Plan last modified Tigist Corral RN (4/4/2018)   Next INR check 5/30/2018   Target end date Indefinite    Indications   Long-term (current) use of anticoagulants [Z79.01] [Z79.01]  Atrial fibrillation (H) [I48.91] (Resolved) [I48.91]         Anticoagulation Episode Summary     INR check location     Preferred lab     Send INR reminders to [facility]    Comments patient have standing INR order to be draw at infusion visit.  advise to call [facility] when have INR draw for dosing instruction.  patient can be reach at home phone [phone]      Anticoagulation Care Providers     Provider Role Specialty Phone number    Addy Frias MD [facility] Internal Medicine [phone]            See the Encounter Report to view Anticoagulation Flowsheet and Dosing Calendar (Go to Encounters tab in chart review, and find the Anticoagulation Therapy Visit)    Dosage adjustment made based on physician directed care plan.    Cintia Powers RN

## 2018-05-23 NOTE — PROGRESS NOTES
Infusion Nursing Note:  Amira Arreola presents today for C13D15 Velcade.    Patient seen by provider today: No   present during visit today: Not Applicable.    Note: Pt reports more LE edema today, she forgot to wear her CHEN hose, she will put them on when she gets home.    Intravenous Access:  Labs drawn without difficulty.  Implanted Port.    Treatment Conditions:  Lab Results   Component Value Date    HGB 12.1 05/23/2018     Lab Results   Component Value Date    WBC 8.1 05/23/2018      Lab Results   Component Value Date    ANEU 7.5 05/23/2018     Lab Results   Component Value Date     05/23/2018      Results reviewed, labs MET treatment parameters, ok to proceed with treatment.      Post Infusion Assessment:  Patient tolerated injection without incident.  Site patent and intact, free from redness, edema or discomfort.  No evidence of extravasations.  Access discontinued per protocol.    Discharge Plan:   Patient declined prescription refills.  Discharge instructions reviewed with: Patient.  Patient and/or family verbalized understanding of discharge instructions and all questions answered.  Copy of AVS reviewed with patient and/or family.  Patient will return 5/30/18 for next appointment.  Patient discharged in stable condition accompanied by: self.  Departure Mode: Ambulatory.    Fanny Bob RN

## 2018-05-23 NOTE — MR AVS SNAPSHOT
Amira IVY Arreola   5/23/2018   Anticoagulation Therapy Visit    Description:  85 year old female   Provider:  Addy Frias MD   Department:  [facility]           INR as of 5/23/2018     Today's INR No new INR was available at the time of this encounter.      Anticoagulation Summary as of 5/23/2018     INR goal 2.0-3.0   Today's INR No new INR was available at the time of this encounter.   Full warfarin instructions 5/23: 6 mg; Otherwise 4 mg every day   Next INR check 5/30/2018    Indications   Long-term (current) use of anticoagulants [Z79.01] [Z79.01]  Atrial fibrillation (H) [I48.91] (Resolved) [I48.91]         May 2018 Details    Sun Mon Tue Wed Thu Fri Sat       1               2               3               4               5                 6               7               8               9               10               11               12                 13               14               15               16               17               18               19                 20               21               22               23      6 mg   See details      24      4 mg         25      4 mg         26      4 mg           27      4 mg         28      4 mg         29      4 mg         30            31                  Date Details   05/23 This INR check       Date of next INR:  5/30/2018         How to take your warfarin dose     To take:  4 mg Take 1 of the 4 mg tablets.    To take:  6 mg Take 1.5 of the 4 mg tablets.

## 2018-05-23 NOTE — MR AVS SNAPSHOT
After Visit Summary   5/23/2018    Amira Arreola    MRN: [id number]           Patient Information     Date Of Birth          7/17/1932        Visit Information        Provider Department      5/23/2018 10:00 AM  INFUSION CHAIR 18 [facility]        Today's Diagnoses     Multiple myeloma not having achieved remission (H)    -  1    Paroxysmal atrial fibrillation (H)        Portacath in place           Follow-ups after your visit        Your next 10 appointments already scheduled     May 30, 2018  9:30 AM CDT   Level 2 with  INFUSION CHAIR 9   [facility] ([facility])    [facility]  [address]  [place] [address]   [phone]            Jun 06, 2018  9:30 AM CDT   Level 5 with  INFUSION CHAIR 10   [facility] ([facility])    [facility]  [address]  [place] [address]   [phone]            Jun 06, 2018 10:00 AM CDT   Return Visit with Shayne Roberts MD   [facility] ([facility])    [facility]  [address]  [place] [address]   [phone]            Jun 13, 2018  9:30 AM CDT   Level 2 with  INFUSION CHAIR 9   [facility] ([facility])    [facility]  [address]  [place] [address] [phone]              Who to contact     If you have questions or need follow up information about today's clinic visit or your schedule please contact [facility] directly at [phone].  Normal or non-critical lab and imaging results will be communicated to you by MyChart, letter or phone within 4 business days after the clinic has received the results. If you do not hear from us within 7 days, please contact the clinic  "through [employer] or phone. If you have a critical or abnormal lab result, we will notify you by phone as soon as possible.  Submit refill requests through [employer] or call your pharmacy and they will forward the refill request to us. Please allow 3 business days for your refill to be completed.          Additional Information About Your Visit        Care EveryWhere ID     This is your Care EveryWhere ID. This could be used by other organizations to access your [facility] medical records  [id number]        Your Vitals Were     Pulse Temperature Respirations Height Pulse Oximetry BMI (Body Mass Index)    81 98.4  F (36.9  C) (Oral) 16 1.664 m (5' 5.5\") 96% 25.01 kg/m2       Blood Pressure from Last 3 Encounters:   05/23/18 131/75   05/16/18 123/71   05/09/18 141/88    Weight from Last 3 Encounters:   05/23/18 69.2 kg (152 lb 9.6 oz)   05/16/18 68 kg (150 lb)   05/09/18 66.2 kg (146 lb)              We Performed the Following     CBC with platelets differential     INR        Primary Care Provider Office Phone # Fax #    Addy Frias [phone] [address]  [place] [address]        Equal Access to Services     [facility] : Hadii liane ku hadasho Soomaali, waaxda luqadaha, qaybta kaalmada adeegyada, waxay lópez dominique . So [facility] [phone].    ATENCIÓN: Si habla español, tiene a barlow disposición servicios gratuitos de asistencia lingüística. Llame al [phone].    We comply with applicable federal civil rights laws and [place] laws. We do not discriminate on the basis of race, color, national origin, age, disability, sex, sexual orientation, or gender identity.            Thank you!     Thank you for choosing [facility]  for your care. Our goal is always to provide you with excellent care. Hearing back from our patients is one way we can continue to improve our services. Please take a few minutes to complete the written " survey that you may receive in the mail after your visit with us. Thank you!             Your Updated Medication List - Protect others around you: Learn how to safely use, store and throw away your medicines at www.disposemymeds.org.          This list is accurate as of 5/23/18 10:58 AM.  Always use your most recent med list.                   Brand Name Dispense Instructions for use Diagnosis    acyclovir 400 MG tablet    ZOVIRAX    60 tablet    Take 1 tablet (400 mg) by mouth 2 times daily Start 1 week prior to daratumumab and continue for 3 months after treatment is complete.    Multiple myeloma not having achieved remission (H)       CALCIUM CITRATE + PO      Take 2,000 mg by mouth daily 2 tabs        carboxymethylcellulose 0.5 % Soln ophthalmic solution    REFRESH PLUS     1 drop 4 times daily        CLARINEX PO      Take by mouth daily Taking claritin        COMPAZINE PO      Take 10 mg by mouth daily as needed        cycloSPORINE 0.05 % ophthalmic emulsion    RESTASIS     Place 1 drop into both eyes every 12 hours        DAILY MULTIVITAMIN PO      Take 1 tablet by mouth daily.    Routine general medical examination at a health care facility       * dexamethasone 4 MG tablet    DECADRON    28 tablet    Take 20mg (5 tablets) by mouth every week on the morning of velcade injection.    Multiple myeloma not having achieved remission (H)       * dexamethasone 4 MG tablet    DECADRON    28 tablet    Take 20mg (5 tablets) by mouth every week on the morning of velcade injection.    Multiple myeloma not having achieved remission (H)       erythromycin ophthalmic ointment    ROMYCIN     Place 1 Application into both eyes At Bedtime        lidocaine-prilocaine cream    EMLA    30 g    Apply topically as needed for moderate pain Apply dollop size amount to port site 30-60 min prior to accessing    Multiple myeloma not having achieved remission (H)       LORazepam 0.5 MG tablet    ATIVAN    30 tablet    Take 1 tablet (0.5  mg) by mouth every 8 hours as needed for anxiety    Multiple myeloma not having achieved remission (H)       metoprolol succinate 50 MG 24 hr tablet    TOPROL-XL    270 tablet    TAKE 2 TABLETS BY MOUTH EVERY MORNING, AND 1 TABLET EVERY EVENING    Paroxysmal atrial fibrillation (H)       oxyCODONE IR 15 MG tablet    ROXICODONE    60 tablet    Take 1 tablet (15 mg) by mouth every 8 hours as needed for pain maximum 4 tablet(s) per day    Multiple myeloma not having achieved remission (H)       polyethylene glycol powder    MIRALAX/GLYCOLAX     Take 1 capful by mouth daily as needed    Bilateral leg edema       SIMBRINZA 1-0.2 % ophthalmic suspension   Generic drug:  brinzolamide-brimonidine      Place 1 drop into the right eye 2 times daily 1 drop AM and PM        triamcinolone 0.5 % cream    KENALOG    15 g    Apply sparingly to affected area three times daily. 1-2 weeks , as needed    Rash and nonspecific skin eruption       TYLENOL PO      Take 500 mg by mouth every 6 hours as needed for mild pain or fever        UNABLE TO FIND      MEDICATION NAME: Fresh Coat eye drops        VITAMIN D3 PO      Take 1,000 Units by mouth daily        warfarin 4 MG tablet    COUMADIN    110 tablet    TAKE 1-2 TABLETS BY MOUTH EVERY DAY AS DIRECTED BY INR CLINIC    Paroxysmal atrial fibrillation (H), Long-term (current) use of anticoagulants       ZOMETA IV      Inject into the vein every 30 days Every 3 month dosing        * Notice:  This list has 2 medication(s) that are the same as other medications prescribed for you. Read the directions carefully, and ask your doctor or other care provider to review them with you.

## 2018-05-30 ENCOUNTER — INFUSION THERAPY VISIT (OUTPATIENT)
Dept: INFUSION THERAPY | Facility: CLINIC | Age: 83
End: 2018-05-30
Attending: INTERNAL MEDICINE
Payer: MEDICARE

## 2018-05-30 ENCOUNTER — HOSPITAL ENCOUNTER (OUTPATIENT)
Facility: CLINIC | Age: 83
Setting detail: SPECIMEN
Discharge: HOME | End: 2018-05-30
Attending: INTERNAL MEDICINE | Admitting: INTERNAL MEDICINE
Payer: MEDICARE

## 2018-05-30 ENCOUNTER — ANTICOAGULATION THERAPY VISIT (OUTPATIENT)
Dept: FAMILY MEDICINE | Facility: CLINIC | Age: 83
End: 2018-05-30
Payer: COMMERCIAL

## 2018-05-30 ENCOUNTER — TELEPHONE (OUTPATIENT)
Dept: FAMILY MEDICINE | Facility: CLINIC | Age: 83
End: 2018-05-30

## 2018-05-30 VITALS
SYSTOLIC BLOOD PRESSURE: 135 MMHG | RESPIRATION RATE: 16 BRPM | HEART RATE: 80 BPM | WEIGHT: 151.8 LBS | BODY MASS INDEX: 24.4 KG/M2 | TEMPERATURE: 98.7 F | HEIGHT: 66 IN | DIASTOLIC BLOOD PRESSURE: 71 MMHG

## 2018-05-30 DIAGNOSIS — C90.00 MULTIPLE MYELOMA NOT HAVING ACHIEVED REMISSION (H): Primary | ICD-10-CM

## 2018-05-30 DIAGNOSIS — I48.0 PAROXYSMAL ATRIAL FIBRILLATION (H): ICD-10-CM

## 2018-05-30 DIAGNOSIS — Z79.01 LONG-TERM (CURRENT) USE OF ANTICOAGULANTS: ICD-10-CM

## 2018-05-30 LAB
BASOPHILS # BLD AUTO: 0 10E9/L (ref 0–0.2)
BASOPHILS NFR BLD AUTO: 0 %
DIFFERENTIAL METHOD BLD: ABNORMAL
EOSINOPHIL # BLD AUTO: 0 10E9/L (ref 0–0.7)
EOSINOPHIL NFR BLD AUTO: 0.2 %
ERYTHROCYTE [DISTWIDTH] IN BLOOD BY AUTOMATED COUNT: 14.6 % (ref 10–15)
HCT VFR BLD AUTO: 36.3 % (ref 35–47)
HGB BLD-MCNC: 12.1 G/DL (ref 11.7–15.7)
IMM GRANULOCYTES # BLD: 0 10E9/L (ref 0–0.4)
IMM GRANULOCYTES NFR BLD: 0.5 %
INR PPP: 2.31 (ref 0.86–1.14)
LYMPHOCYTES # BLD AUTO: 0.3 10E9/L (ref 0.8–5.3)
LYMPHOCYTES NFR BLD AUTO: 5.5 %
MCH RBC QN AUTO: 32.9 PG (ref 26.5–33)
MCHC RBC AUTO-ENTMCNC: 33.3 G/DL (ref 31.5–36.5)
MCV RBC AUTO: 99 FL (ref 78–100)
MONOCYTES # BLD AUTO: 0.2 10E9/L (ref 0–1.3)
MONOCYTES NFR BLD AUTO: 2.7 %
NEUTROPHILS # BLD AUTO: 5.4 10E9/L (ref 1.6–8.3)
NEUTROPHILS NFR BLD AUTO: 91.1 %
NRBC # BLD AUTO: 0 10*3/UL
NRBC BLD AUTO-RTO: 0 /100
PLATELET # BLD AUTO: 148 10E9/L (ref 150–450)
RBC # BLD AUTO: 3.68 10E12/L (ref 3.8–5.2)
WBC # BLD AUTO: 6 10E9/L (ref 4–11)

## 2018-05-30 PROCEDURE — 96401 CHEMO ANTI-NEOPL SQ/IM: CPT

## 2018-05-30 PROCEDURE — 85025 COMPLETE CBC W/AUTO DIFF WBC: CPT | Performed by: INTERNAL MEDICINE

## 2018-05-30 PROCEDURE — 85610 PROTHROMBIN TIME: CPT | Performed by: INTERNAL MEDICINE

## 2018-05-30 PROCEDURE — 99207 ZZC NO CHARGE NURSE ONLY: CPT | Performed by: INTERNAL MEDICINE

## 2018-05-30 PROCEDURE — 25000128 H RX IP 250 OP 636: Performed by: INTERNAL MEDICINE

## 2018-05-30 RX ORDER — HEPARIN SODIUM (PORCINE) LOCK FLUSH IV SOLN 100 UNIT/ML 100 UNIT/ML
5 SOLUTION INTRAVENOUS EVERY 8 HOURS
Status: DISCONTINUED | OUTPATIENT
Start: 2018-05-30 | End: 2018-05-30 | Stop reason: HOSPADM

## 2018-05-30 RX ADMIN — HEPARIN 5 ML: 100 SYRINGE at 11:01

## 2018-05-30 RX ADMIN — BORTEZOMIB 2.3 MG: 3.5 INJECTION, POWDER, LYOPHILIZED, FOR SOLUTION INTRAVENOUS; SUBCUTANEOUS at 11:01

## 2018-05-30 NOTE — TELEPHONE ENCOUNTER
INR Result of 2.31 today in labs (checked at [facility])  Called patient, male answered (no C2C) and states to call patient back this afternoon (3pm or later)     Anticoagulation encounter started     Will need to recall patient     Aruna ROBERSON RN

## 2018-05-30 NOTE — PROGRESS NOTES
ANTICOAGULATION FOLLOW-UP CLINIC VISIT    Patient Name:  Amira Arreola  Date:  5/30/2018  Contact Type:  Telephone    SUBJECTIVE:     Patient Findings     Positives No Problem Findings           OBJECTIVE    INR   Date Value Ref Range Status   05/30/2018 2.31 (H) 0.86 - 1.14 Final       ASSESSMENT / PLAN  INR assessment THER    Recheck INR In: 1 WEEK    INR Location Outside lab      Anticoagulation Summary as of 5/30/2018     INR goal 2.0-3.0   Today's INR 2.31   Warfarin maintenance plan 6 mg (4 mg x 1.5) on Wed; 4 mg (4 mg x 1) all other days   Full warfarin instructions 6 mg on Wed; 4 mg all other days   Weekly warfarin total 30 mg   Plan last modified Aruna Fajardo RN (5/30/2018)   Next INR check 6/6/2018   Target end date Indefinite    Indications   Long-term (current) use of anticoagulants [Z79.01] [Z79.01]  Atrial fibrillation (H) [I48.91] (Resolved) [I48.91]         Anticoagulation Episode Summary     INR check location     Preferred lab     Send INR reminders to [facility] ANTICOAGULATION    Comments patient have standing INR order to be draw at infusion visit.  advise to call [facility] when have INR draw for dosing instruction.  patient can be reach at home phone [phone]      Anticoagulation Care Providers     Provider Role Specialty Phone number    Addy Frias MD [facility] Internal Medicine [phone]            See the Encounter Report to view Anticoagulation Flowsheet and Dosing Calendar (Go to Encounters tab in chart review, and find the Anticoagulation Therapy Visit)    Dosing adjustment made based on physician directed plan of care    Pt has INRs drawn at infusion - advised continuing with 6mg Wed, 4mg ROW for warfarin and recheck INR in 1 week.     Aruna Fajardo, RN

## 2018-05-30 NOTE — PROGRESS NOTES
Infusion Nursing Note:  Amira Arreola presents today for C13D22 Velcade.    Patient seen by provider today: No   present during visit today: Not Applicable.    Note: N/A.    Intravenous Access:  Labs drawn without difficulty.  Implanted Port.    Treatment Conditions:  Lab Results   Component Value Date    HGB 12.1 05/30/2018     Lab Results   Component Value Date    WBC 6.0 05/30/2018      Lab Results   Component Value Date    ANEU 5.4 05/30/2018     Lab Results   Component Value Date     05/30/2018      Results reviewed, labs MET treatment parameters, ok to proceed with treatment.      Post Infusion Assessment:  Patient tolerated injection without incident.  Site patent and intact, free from redness, edema or discomfort.  No evidence of extravasations.    Discharge Plan:   Discharge instructions reviewed with: Patient.  Patient and/or family verbalized understanding of discharge instructions and all questions answered.  Copy of AVS reviewed with patient and/or family.  Patient will return 6/6/18 for next appointment.  Patient discharged in stable condition accompanied by: self.  Departure Mode: Ambulatory.    Ladonna Boo RN

## 2018-05-30 NOTE — MR AVS SNAPSHOT
After Visit Summary   5/30/2018    Amira Arreola    MRN: [id number]           Patient Information     Date Of Birth          7/17/1932        Visit Information        Provider Department      5/30/2018 9:30 AM  INFUSION CHAIR 9 [facility]        Today's Diagnoses     Multiple myeloma not having achieved remission (H)    -  1    Paroxysmal atrial fibrillation (H)           Follow-ups after your visit        Your next 10 appointments already scheduled     Jun 06, 2018  9:30 AM CDT   Level 5 with  INFUSION CHAIR 10   [facility] ([facility])    [facility]  [address]  [place] [address]   [phone]            Jun 06, 2018 10:00 AM CDT   Return Visit with Shayne Roberts MD   [facility] ([facility])    [facility]  [address]  [place] [address]   [phone]            Jun 13, 2018  9:30 AM CDT   Level 2 with  INFUSION CHAIR 9   [facility] ([facility])    [facility]  [address]  [place] [address]   [phone]              Who to contact     If you have questions or need follow up information about today's clinic visit or your schedule please contact [facility] directly at [phone].  Normal or non-critical lab and imaging results will be communicated to you by MyChart, letter or phone within 4 business days after the clinic has received the results. If you do not hear from us within 7 days, please contact the clinic through MyChart or phone. If you have a critical or abnormal lab result, we will notify you by phone as soon as possible.  Submit refill requests through [employer] or call your pharmacy and they will forward the refill request to us. Please allow 3 business days for your refill to  "be completed.          Additional Information About Your Visit        Care EveryWhere ID     This is your Care EveryWhere ID. This could be used by other organizations to access your [facility] medical records  [id number]        Your Vitals Were     Height BMI (Body Mass Index)                1.664 m (5' 5.51\") 24.87 kg/m2           Blood Pressure from Last 3 Encounters:   05/23/18 131/75   05/16/18 123/71   05/09/18 141/88    Weight from Last 3 Encounters:   05/30/18 68.9 kg (151 lb 12.8 oz)   05/23/18 69.2 kg (152 lb 9.6 oz)   05/16/18 68 kg (150 lb)              We Performed the Following     CBC with platelets differential     INR        Primary Care Provider Office Phone # Fax #    Addy Sean Frias [phone] [address]  [place] [address]        Equal Access to Services     [facility]: Hadii aad ku hadasho Sojaylonali, waaxda luqadaha, qaybta kaalmada adeegyada, waxay genoin hayhildan blanca dominique . So [facility] [phone].    ATENCIÓN: Si habla español, tiene a barlow disposición servicios gratuitos de asistencia lingüística. Kadie al [phone].    We comply with applicable federal civil rights laws and [place] laws. We do not discriminate on the basis of race, color, national origin, age, disability, sex, sexual orientation, or gender identity.            Thank you!     Thank you for choosing [facility]  for your care. Our goal is always to provide you with excellent care. Hearing back from our patients is one way we can continue to improve our services. Please take a few minutes to complete the written survey that you may receive in the mail after your visit with us. Thank you!             Your Updated Medication List - Protect others around you: Learn how to safely use, store and throw away your medicines at www.disposemymeds.org.          This list is accurate as of 5/30/18 11:04 AM.  Always use your most recent med list.          "          Brand Name Dispense Instructions for use Diagnosis    acyclovir 400 MG tablet    ZOVIRAX    60 tablet    Take 1 tablet (400 mg) by mouth 2 times daily Start 1 week prior to daratumumab and continue for 3 months after treatment is complete.    Multiple myeloma not having achieved remission (H)       CALCIUM CITRATE + PO      Take 2,000 mg by mouth daily 2 tabs        carboxymethylcellulose 0.5 % Soln ophthalmic solution    REFRESH PLUS     1 drop 4 times daily        CLARINEX PO      Take by mouth daily Taking claritin        COMPAZINE PO      Take 10 mg by mouth daily as needed        cycloSPORINE 0.05 % ophthalmic emulsion    RESTASIS     Place 1 drop into both eyes every 12 hours        DAILY MULTIVITAMIN PO      Take 1 tablet by mouth daily.    Routine general medical examination at a health care facility       * dexamethasone 4 MG tablet    DECADRON    28 tablet    Take 20mg (5 tablets) by mouth every week on the morning of velcade injection.    Multiple myeloma not having achieved remission (H)       * dexamethasone 4 MG tablet    DECADRON    28 tablet    Take 20mg (5 tablets) by mouth every week on the morning of velcade injection.    Multiple myeloma not having achieved remission (H)       erythromycin ophthalmic ointment    ROMYCIN     Place 1 Application into both eyes At Bedtime        lidocaine-prilocaine cream    EMLA    30 g    Apply topically as needed for moderate pain Apply dollop size amount to port site 30-60 min prior to accessing    Multiple myeloma not having achieved remission (H)       LORazepam 0.5 MG tablet    ATIVAN    30 tablet    Take 1 tablet (0.5 mg) by mouth every 8 hours as needed for anxiety    Multiple myeloma not having achieved remission (H)       metoprolol succinate 50 MG 24 hr tablet    TOPROL-XL    270 tablet    TAKE 2 TABLETS BY MOUTH EVERY MORNING, AND 1 TABLET EVERY EVENING    Paroxysmal atrial fibrillation (H)       oxyCODONE IR 15 MG tablet    ROXICODONE    60  tablet    Take 1 tablet (15 mg) by mouth every 8 hours as needed for pain maximum 4 tablet(s) per day    Multiple myeloma not having achieved remission (H)       polyethylene glycol powder    MIRALAX/GLYCOLAX     Take 1 capful by mouth daily as needed    Bilateral leg edema       SIMBRINZA 1-0.2 % ophthalmic suspension   Generic drug:  brinzolamide-brimonidine      Place 1 drop into the right eye 2 times daily 1 drop AM and PM        triamcinolone 0.5 % cream    KENALOG    15 g    Apply sparingly to affected area three times daily. 1-2 weeks , as needed    Rash and nonspecific skin eruption       TYLENOL PO      Take 500 mg by mouth every 6 hours as needed for mild pain or fever        UNABLE TO FIND      MEDICATION NAME: Fresh Coat eye drops        VITAMIN D3 PO      Take 1,000 Units by mouth daily        warfarin 4 MG tablet    COUMADIN    110 tablet    TAKE 1-2 TABLETS BY MOUTH EVERY DAY AS DIRECTED BY INR CLINIC    Paroxysmal atrial fibrillation (H), Long-term (current) use of anticoagulants       ZOMETA IV      Inject into the vein every 30 days Every 3 month dosing        * Notice:  This list has 2 medication(s) that are the same as other medications prescribed for you. Read the directions carefully, and ask your doctor or other care provider to review them with you.

## 2018-05-30 NOTE — TELEPHONE ENCOUNTER
Spoke with patient to discuss INR recheck and warfarin dosing instructions  See [facility] encounter     Aruna ROBERSON RN

## 2018-05-30 NOTE — MR AVS SNAPSHOT
Amira IVY Arreola   5/30/2018   Anticoagulation Therapy Visit    Description:  85 year old female   Provider:  Addy Frias MD   Department:  [facility]           INR as of 5/30/2018     Today's INR 2.31      Anticoagulation Summary as of 5/30/2018     INR goal 2.0-3.0   Today's INR 2.31   Full warfarin instructions 6 mg on Wed; 4 mg all other days   Next INR check 6/6/2018    Indications   Long-term (current) use of anticoagulants [Z79.01] [Z79.01]  Atrial fibrillation (H) [I48.91] (Resolved) [I48.91]         May 2018 Details    Sun Mon Tue Wed Thu Fri Sat       1               2               3               4               5                 6               7               8               9               10               11               12                 13               14               15               16               17               18               19                 20               21               22               23               24               25               26                 27               28               29               30      6 mg   See details      31      4 mg            Date Details   05/30 This INR check               How to take your warfarin dose     To take:  4 mg Take 1 of the 4 mg tablets.    To take:  6 mg Take 1.5 of the 4 mg tablets.           June 2018 Details    Sun Mon Tue Wed Thu Fri Sat          1      4 mg         2      4 mg           3      4 mg         4      4 mg         5      4 mg         6            7               8               9                 10               11               12               13               14               15               16                 17               18               19               20               21               22               23                 24               25               26               27               28               29               30                Date Details   No additional details     Date of next INR:  6/6/2018         How to take your warfarin dose     To take:  4 mg Take 1 of the 4 mg tablets.    To take:  6 mg Take 1.5 of the 4 mg tablets.

## 2018-06-06 ENCOUNTER — HOSPITAL ENCOUNTER (OUTPATIENT)
Facility: CLINIC | Age: 83
Setting detail: SPECIMEN
Discharge: HOME | End: 2018-06-06
Attending: INTERNAL MEDICINE | Admitting: INTERNAL MEDICINE
Payer: MEDICARE

## 2018-06-06 ENCOUNTER — INFUSION THERAPY VISIT (OUTPATIENT)
Dept: INFUSION THERAPY | Facility: CLINIC | Age: 83
End: 2018-06-06
Attending: INTERNAL MEDICINE
Payer: MEDICARE

## 2018-06-06 ENCOUNTER — ANTICOAGULATION THERAPY VISIT (OUTPATIENT)
Dept: FAMILY MEDICINE | Facility: CLINIC | Age: 83
End: 2018-06-06
Payer: COMMERCIAL

## 2018-06-06 ENCOUNTER — ANTICOAGULATION THERAPY VISIT (OUTPATIENT)
Dept: FAMILY MEDICINE | Facility: CLINIC | Age: 83
End: 2018-06-06

## 2018-06-06 ENCOUNTER — TELEPHONE (OUTPATIENT)
Dept: FAMILY MEDICINE | Facility: CLINIC | Age: 83
End: 2018-06-06

## 2018-06-06 ENCOUNTER — ONCOLOGY VISIT (OUTPATIENT)
Dept: ONCOLOGY | Facility: CLINIC | Age: 83
End: 2018-06-06
Attending: INTERNAL MEDICINE
Payer: MEDICARE

## 2018-06-06 VITALS
DIASTOLIC BLOOD PRESSURE: 88 MMHG | BODY MASS INDEX: 23.79 KG/M2 | HEART RATE: 102 BPM | RESPIRATION RATE: 18 BRPM | SYSTOLIC BLOOD PRESSURE: 138 MMHG | TEMPERATURE: 97.6 F | WEIGHT: 145.2 LBS

## 2018-06-06 VITALS
HEART RATE: 102 BPM | BODY MASS INDEX: 23.79 KG/M2 | WEIGHT: 145.2 LBS | RESPIRATION RATE: 18 BRPM | TEMPERATURE: 97.6 F | DIASTOLIC BLOOD PRESSURE: 88 MMHG | SYSTOLIC BLOOD PRESSURE: 138 MMHG

## 2018-06-06 DIAGNOSIS — I48.0 PAROXYSMAL ATRIAL FIBRILLATION (H): ICD-10-CM

## 2018-06-06 DIAGNOSIS — C90.00 MULTIPLE MYELOMA NOT HAVING ACHIEVED REMISSION (H): Primary | ICD-10-CM

## 2018-06-06 DIAGNOSIS — Z79.01 LONG-TERM (CURRENT) USE OF ANTICOAGULANTS: ICD-10-CM

## 2018-06-06 DIAGNOSIS — Z79.01 LONG-TERM (CURRENT) USE OF ANTICOAGULANTS: Primary | ICD-10-CM

## 2018-06-06 LAB
ALBUMIN SERPL-MCNC: 3.4 G/DL (ref 3.4–5)
ALP SERPL-CCNC: 43 U/L (ref 40–150)
ALT SERPL W P-5'-P-CCNC: 27 U/L (ref 0–50)
AST SERPL W P-5'-P-CCNC: 25 U/L (ref 0–45)
BASOPHILS # BLD AUTO: 0 10E9/L (ref 0–0.2)
BASOPHILS NFR BLD AUTO: 0.1 %
BILIRUB DIRECT SERPL-MCNC: 0.2 MG/DL (ref 0–0.2)
BILIRUB SERPL-MCNC: 0.8 MG/DL (ref 0.2–1.3)
DIFFERENTIAL METHOD BLD: ABNORMAL
EOSINOPHIL # BLD AUTO: 0 10E9/L (ref 0–0.7)
EOSINOPHIL NFR BLD AUTO: 0.1 %
ERYTHROCYTE [DISTWIDTH] IN BLOOD BY AUTOMATED COUNT: 14.5 % (ref 10–15)
HCT VFR BLD AUTO: 37.6 % (ref 35–47)
HGB BLD-MCNC: 12.6 G/DL (ref 11.7–15.7)
IMM GRANULOCYTES # BLD: 0 10E9/L (ref 0–0.4)
IMM GRANULOCYTES NFR BLD: 0.4 %
INR PPP: 2.33 (ref 0.86–1.14)
LYMPHOCYTES # BLD AUTO: 0.3 10E9/L (ref 0.8–5.3)
LYMPHOCYTES NFR BLD AUTO: 4.8 %
MCH RBC QN AUTO: 32.6 PG (ref 26.5–33)
MCHC RBC AUTO-ENTMCNC: 33.5 G/DL (ref 31.5–36.5)
MCV RBC AUTO: 97 FL (ref 78–100)
MONOCYTES # BLD AUTO: 0.2 10E9/L (ref 0–1.3)
MONOCYTES NFR BLD AUTO: 2.5 %
NEUTROPHILS # BLD AUTO: 6.2 10E9/L (ref 1.6–8.3)
NEUTROPHILS NFR BLD AUTO: 92.1 %
NRBC # BLD AUTO: 0 10*3/UL
NRBC BLD AUTO-RTO: 0 /100
PLATELET # BLD AUTO: 155 10E9/L (ref 150–450)
PROT SERPL-MCNC: 6.1 G/DL (ref 6.8–8.8)
RBC # BLD AUTO: 3.86 10E12/L (ref 3.8–5.2)
WBC # BLD AUTO: 6.7 10E9/L (ref 4–11)

## 2018-06-06 PROCEDURE — 80076 HEPATIC FUNCTION PANEL: CPT | Performed by: INTERNAL MEDICINE

## 2018-06-06 PROCEDURE — A9270 NON-COVERED ITEM OR SERVICE: HCPCS | Mod: GY | Performed by: INTERNAL MEDICINE

## 2018-06-06 PROCEDURE — 83883 ASSAY NEPHELOMETRY NOT SPEC: CPT | Performed by: INTERNAL MEDICINE

## 2018-06-06 PROCEDURE — 96415 CHEMO IV INFUSION ADDL HR: CPT

## 2018-06-06 PROCEDURE — 96413 CHEMO IV INFUSION 1 HR: CPT

## 2018-06-06 PROCEDURE — 85025 COMPLETE CBC W/AUTO DIFF WBC: CPT | Performed by: INTERNAL MEDICINE

## 2018-06-06 PROCEDURE — 84165 PROTEIN E-PHORESIS SERUM: CPT | Performed by: INTERNAL MEDICINE

## 2018-06-06 PROCEDURE — 00000402 ZZHCL STATISTIC TOTAL PROTEIN: Performed by: INTERNAL MEDICINE

## 2018-06-06 PROCEDURE — 25000128 H RX IP 250 OP 636: Performed by: INTERNAL MEDICINE

## 2018-06-06 PROCEDURE — 96375 TX/PRO/DX INJ NEW DRUG ADDON: CPT

## 2018-06-06 PROCEDURE — 96401 CHEMO ANTI-NEOPL SQ/IM: CPT

## 2018-06-06 PROCEDURE — 85610 PROTHROMBIN TIME: CPT | Performed by: INTERNAL MEDICINE

## 2018-06-06 PROCEDURE — 99214 OFFICE O/P EST MOD 30 MIN: CPT | Performed by: INTERNAL MEDICINE

## 2018-06-06 PROCEDURE — 99207 ZZC NO CHARGE NURSE ONLY: CPT | Performed by: INTERNAL MEDICINE

## 2018-06-06 PROCEDURE — 25000132 ZZH RX MED GY IP 250 OP 250 PS 637: Mod: GY | Performed by: INTERNAL MEDICINE

## 2018-06-06 RX ORDER — ZOLEDRONIC ACID 0.04 MG/ML
4 INJECTION, SOLUTION INTRAVENOUS ONCE
Status: CANCELLED | OUTPATIENT
Start: 2018-07-25 | End: 2018-07-25

## 2018-06-06 RX ORDER — HEPARIN SODIUM (PORCINE) LOCK FLUSH IV SOLN 100 UNIT/ML 100 UNIT/ML
5 SOLUTION INTRAVENOUS EVERY 8 HOURS
Status: DISCONTINUED | OUTPATIENT
Start: 2018-06-06 | End: 2018-06-06 | Stop reason: HOSPADM

## 2018-06-06 RX ORDER — DIPHENHYDRAMINE HCL 25 MG
50 CAPSULE ORAL ONCE
Status: COMPLETED | OUTPATIENT
Start: 2018-06-06 | End: 2018-06-06

## 2018-06-06 RX ORDER — DEXAMETHASONE 4 MG/1
TABLET ORAL
Qty: 28 TABLET | Refills: 0 | Status: CANCELLED | OUTPATIENT
Start: 2018-06-06

## 2018-06-06 RX ORDER — ACETAMINOPHEN 325 MG/1
650 TABLET ORAL ONCE
Status: COMPLETED | OUTPATIENT
Start: 2018-06-06 | End: 2018-06-06

## 2018-06-06 RX ORDER — DEXAMETHASONE 4 MG/1
TABLET ORAL
Qty: 28 TABLET | Refills: 0 | Status: SHIPPED | OUTPATIENT
Start: 2018-06-06 | End: 2018-07-11

## 2018-06-06 RX ADMIN — ACETAMINOPHEN 650 MG: 325 TABLET ORAL at 11:04

## 2018-06-06 RX ADMIN — SODIUM CHLORIDE 250 ML: 9 INJECTION, SOLUTION INTRAVENOUS at 11:03

## 2018-06-06 RX ADMIN — BORTEZOMIB 2.3 MG: 3.5 INJECTION, POWDER, LYOPHILIZED, FOR SOLUTION INTRAVENOUS; SUBCUTANEOUS at 14:47

## 2018-06-06 RX ADMIN — HEPARIN 5 ML: 100 SYRINGE at 14:56

## 2018-06-06 RX ADMIN — DARATUMUMAB 1000 MG: 100 INJECTION, SOLUTION, CONCENTRATE INTRAVENOUS at 11:45

## 2018-06-06 RX ADMIN — DIPHENHYDRAMINE HYDROCHLORIDE 50 MG: 25 CAPSULE ORAL at 11:04

## 2018-06-06 RX ADMIN — DEXAMETHASONE SODIUM PHOSPHATE 12 MG: 10 INJECTION, SOLUTION INTRAMUSCULAR; INTRAVENOUS at 11:25

## 2018-06-06 ASSESSMENT — PAIN SCALES - GENERAL
PAINLEVEL: NO PAIN (0)
PAINLEVEL: NO PAIN (0)

## 2018-06-06 NOTE — TELEPHONE ENCOUNTER
Dr Frias,   Patient due for INR referral renewal  Pended order  Please sign  Michelle BENITEZ RN

## 2018-06-06 NOTE — LETTER
"    6/6/2018         RE: Amira Arreola  [address]  [place] [address]        Dear Colleague,    Thank you for referring your patient, Amira Arreola, to the [facility]. Please see a copy of my visit note below.    Oncology Rooming Note    June 6, 2018 10:27 AM   Amira Arreola is a 85 year old female who presents for:    Chief Complaint   Patient presents with     Oncology Clinic Visit     Multiple myeloma not having achieved remission      Initial Vitals: /88  Pulse 102  Temp 97.6  F (36.4  C) (Oral)  Resp 18  Wt 65.9 kg (145 lb 3.2 oz)  BMI 23.79 kg/m2 Estimated body mass index is 23.79 kg/(m^2) as calculated from the following:    Height as of 5/30/18: 1.664 m (5' 5.51\").    Weight as of this encounter: 65.9 kg (145 lb 3.2 oz). Body surface area is 1.75 meters squared.  No Pain (0) Comment: Data Unavailable   No LMP recorded. Patient is postmenopausal.  Allergies reviewed: Yes  Medications reviewed: Yes    Medications: Medication refills not needed today.  Pharm[facility] into [employer]: [facility] [address] [place] [address], [place] - [address] [facility] [address] [facility] [address]    Clinical concerns: None                4 minutes for nursing intake (face to face time)     Zora Bentley MA              Visit Date:   06/06/2018      SUBJECTIVE:  Ms. Arreola is an 85-year-old female with kappa free light chain multiple myeloma.  She is on Velcade, dexamethasone and daratumumab.  Overall, she is tolerating it well.  Her kappa free light chain had decreased from 60-1.91.  Now it is slowly increasing.  It was 2.82 on 05/09/2018.      The patient has fatigue.  No headache.  Sometimes she has some dizziness.  No chest pain.  No difficulty breathing.  No nausea or vomiting.  Appetite fairly good.  No urinary bowel complaints.  She has chronic back pain.  No worsening of it.  She takes oxycodone, it helps with the pain.      The patient gets intermittent numbness and tingling.  " Does not last all the time.  Comes for a few days after Velcade, then goes away.  Lately she has some itching in the skin, mainly in the hands.      PHYSICAL EXAMINATION:   GENERAL:  She is alert, oriented x 3.   VITAL SIGNS:  Reviewed.      Rest of the systems not examined.      LABORATORY DATA:  Reviewed.      ASSESSMENT:   1.  An 85-year-old female with kappa free light chain multiple myeloma.   2.  Chronic back pain.   3.  Peripheral neuropathy.   4.  Fatigue.      PLAN:     1.  The patient is clinically stable from multiple myeloma.  I told her that her kappa free light chain has been slowly increasing.  Will await further results from today.   a.  She is on Velcade, dexamethasone and daratumumab.  We will continue on it.  If there is increase in kappa free light chain, my plan would be to stop Velcade and start her on pomalidomide.  The patient agreeable for this plan.   2.  The patient gets Zometa every 3 months, which will be continued.  She does not have any jaw-related symptoms or dental problems.   3.  She has chronic back pain.  She will continue on the oxycodone.   4.  She has fatigue.  This is due to multiple factors including her age, myeloma and treatment.  Advised her to be careful and avoid falls.     5.  I will see her in 4-6 weeks' time.  Advised her to see a physician sooner if she has worsening pain, fever, infection, worsening weakness or any other concerns.  She and her daughter had a few questions, which were all answered.      Total face-to-face time spent 25 minutes, most of time spent in counseling and coordination of care.         ITZ LOPEZ MD             D: 2018   T: 2018   MT: ROXANA      Name:     ALBERTO PARSON   MRN:      [id number]        Account:      [id number]   :      1932           Visit Date:   2018      Document: [id number]       Again, thank you for allowing me to participate in the care of your patient.        Sincerely,        Itz  MD Armando

## 2018-06-06 NOTE — MR AVS SNAPSHOT
After Visit Summary   6/6/2018    Amira Arreola    MRN: [id number]           Patient Information     Date Of Birth          7/17/1932        Visit Information        Provider Department      6/6/2018 9:30 AM SH INFUSION CHAIR 10 [facility]        Today's Diagnoses     Multiple myeloma not having achieved remission (H)    -  1    Paroxysmal atrial fibrillation (H)           Follow-ups after your visit        Your next 10 appointments already scheduled     Jun 13, 2018  9:30 AM CDT   Level 2 with SH INFUSION CHAIR 9   [facility] ([facility])    [facility]  [address]  [place] [address]   [phone]            Jun 20, 2018  9:30 AM CDT   Level 2 with SH INFUSION CHAIR 15   [facility] ([facility])    [facility]  [address]  [place] [address]   [phone]            Jun 27, 2018  9:30 AM CDT   Level 2 with SH INFUSION CHAIR 20   [facility] ([facility])    [facility]  [address]  [place] [address]   [phone]            Jul 03, 2018  9:30 AM CDT   Level 5 with SH INFUSION CHAIR 15   [facility] ([facility])    [facility]  [address]  [place] [address]   [phone]            Jul 11, 2018  9:30 AM CDT   Level 2 with SH INFUSION CHAIR 20   [facility] ([facility])    [facility] [place]  [address]  [place] [address]   [phone]            Jul 18, 2018  9:30 AM CDT   Level 2 with SH INFUSION CHAIR 15   [facility] ([facility])    [facility]  [address]  [place]  [address]   [phone]            Jul 18, 2018 10:40 AM CDT   Return Visit with Shayne Roberts MD   [facility] ([facility])    [facility] [place]  [address] [address] [phone]              Who to contact     If you have questions or need follow up information about today's clinic visit or your schedule please contact [facility] directly at [phone].  Normal or non-critical lab and imaging results will be communicated to you by MyChart, letter or phone within 4 business days after the clinic has received the results. If you do not hear from us within 7 days, please contact the clinic through MyChart or phone. If you have a critical or abnormal lab result, we will notify you by phone as soon as possible.  Submit refill requests through [employer] or call your pharmacy and they will forward the refill request to us. Please allow 3 business days for your refill to be completed.          Additional Information About Your Visit        Care EveryWhere ID     This is your Care EveryWhere ID. This could be used by other organizations to access your [facility] medical records  [id number]        Your Vitals Were     Pulse Temperature Respirations BMI (Body Mass Index)          102 97.6  F (36.4  C) (Oral) 18 23.79 kg/m2         Blood Pressure from Last 3 Encounters:   06/06/18 138/88   06/06/18 138/88   05/30/18 135/71    Weight from Last 3 Encounters:   06/06/18 65.9 kg (145 lb 3.2 oz)   06/06/18 65.9 kg (145 lb 3.2 oz)   05/30/18 68.9 kg (151 lb 12.8 oz)              We Performed the Following     CBC with platelets differential     Hepatic panel     INR     Kappa and lambda light chain     Protein electrophoresis          Today's Medication Changes          These changes are accurate as of 6/6/18  1:23 PM.  If you have any questions, ask your nurse or doctor.               These medicines have changed or have  updated prescriptions.        Dose/Directions    * dexamethasone 4 MG tablet   Commonly known as:  DECADRON   This may have changed:  Another medication with the same name was added. Make sure you understand how and when to take each.   Used for:  Multiple myeloma not having achieved remission (H)        Take 20mg (5 tablets) by mouth every week on the morning of velcade injection.   Quantity:  28 tablet   Refills:  0       * dexamethasone 4 MG tablet   Commonly known as:  DECADRON   This may have changed:  Another medication with the same name was added. Make sure you understand how and when to take each.   Used for:  Multiple myeloma not having achieved remission (H)        Take 20mg (5 tablets) by mouth every week on the morning of velcade injection.   Quantity:  28 tablet   Refills:  0       * dexamethasone 4 MG tablet   Commonly known as:  DECADRON   This may have changed:  You were already taking a medication with the same name, and this prescription was added. Make sure you understand how and when to take each.   Used for:  Multiple myeloma not having achieved remission (H)        Take 20mg (5 tablets) by mouth every week on the morning of velcade injection.   Quantity:  28 tablet   Refills:  0       * Notice:  This list has 3 medication(s) that are the same as other medications prescribed for you. Read the directions carefully, and ask your doctor or other care provider to review them with you.         Where to get your medicines      These medications were sent to [facility] [id number] [place] [address] [address] [facility] [address] AT [facility] [address]  [address], [facility] [address]     Phone:  [phone]     dexamethasone 4 MG tablet                Primary Care Provider Office Phone # Fax #    Addy Frias [phone] [address] [facility] [address]  [facility] [address]        Equal Access to Services     [facility] AH: rhonda Mcmanus qaybta  hung oleasergio dominique ah. Yuki [facility] [phone].    ATENCIÓN: Si feli park, tiene a barlow disposición servicios gratuitos de asistencia lingüística. Kadie al [phone].    We comply with applicable federal civil rights laws and [place] laws. We do not discriminate on the basis of race, color, national origin, age, disability, sex, sexual orientation, or gender identity.            Thank you!     Thank you for choosing [facility]  for your care. Our goal is always to provide you with excellent care. Hearing back from our patients is one way we can continue to improve our services. Please take a few minutes to complete the written survey that you may receive in the mail after your visit with us. Thank you!             Your Updated Medication List - Protect others around you: Learn how to safely use, store and throw away your medicines at www.disposemymeds.org.          This list is accurate as of 6/6/18  1:23 PM.  Always use your most recent med list.                   Brand Name Dispense Instructions for use Diagnosis    acyclovir 400 MG tablet    ZOVIRAX    60 tablet    Take 1 tablet (400 mg) by mouth 2 times daily Start 1 week prior to daratumumab and continue for 3 months after treatment is complete.    Multiple myeloma not having achieved remission (H)       CALCIUM CITRATE + PO      Take 2,000 mg by mouth daily 2 tabs        carboxymethylcellulose 0.5 % Soln ophthalmic solution    REFRESH PLUS     1 drop 4 times daily        CLARINEX PO      Take by mouth daily Taking claritin        COMPAZINE PO      Take 10 mg by mouth daily as needed        cycloSPORINE 0.05 % ophthalmic emulsion    RESTASIS     Place 1 drop into both eyes every 12 hours        DAILY MULTIVITAMIN PO      Take 1 tablet by mouth daily.    Routine general medical examination at a health care facility       * dexamethasone 4 MG tablet    DECADRON    28 tablet    Take 20mg  (5 tablets) by mouth every week on the morning of velcade injection.    Multiple myeloma not having achieved remission (H)       * dexamethasone 4 MG tablet    DECADRON    28 tablet    Take 20mg (5 tablets) by mouth every week on the morning of velcade injection.    Multiple myeloma not having achieved remission (H)       * dexamethasone 4 MG tablet    DECADRON    28 tablet    Take 20mg (5 tablets) by mouth every week on the morning of velcade injection.    Multiple myeloma not having achieved remission (H)       erythromycin ophthalmic ointment    ROMYCIN     Place 1 Application into both eyes At Bedtime        lidocaine-prilocaine cream    EMLA    30 g    Apply topically as needed for moderate pain Apply dollop size amount to port site 30-60 min prior to accessing    Multiple myeloma not having achieved remission (H)       LORazepam 0.5 MG tablet    ATIVAN    30 tablet    Take 1 tablet (0.5 mg) by mouth every 8 hours as needed for anxiety    Multiple myeloma not having achieved remission (H)       metoprolol succinate 50 MG 24 hr tablet    TOPROL-XL    270 tablet    TAKE 2 TABLETS BY MOUTH EVERY MORNING, AND 1 TABLET EVERY EVENING    Paroxysmal atrial fibrillation (H)       oxyCODONE IR 15 MG tablet    ROXICODONE    60 tablet    Take 1 tablet (15 mg) by mouth every 8 hours as needed for pain maximum 4 tablet(s) per day    Multiple myeloma not having achieved remission (H)       polyethylene glycol powder    MIRALAX/GLYCOLAX     Take 1 capful by mouth daily as needed    Bilateral leg edema       SIMBRINZA 1-0.2 % ophthalmic suspension   Generic drug:  brinzolamide-brimonidine      Place 1 drop into the right eye 2 times daily 1 drop AM and PM        triamcinolone 0.5 % cream    KENALOG    15 g    Apply sparingly to affected area three times daily. 1-2 weeks , as needed    Rash and nonspecific skin eruption       TYLENOL PO      Take 500 mg by mouth every 6 hours as needed for mild pain or fever        UNABLE TO  FIND      MEDICATION NAME: Fresh Coat eye drops        VITAMIN D3 PO      Take 1,000 Units by mouth daily        warfarin 4 MG tablet    COUMADIN    110 tablet    TAKE 1-2 TABLETS BY MOUTH EVERY DAY AS DIRECTED BY INR CLINIC    Paroxysmal atrial fibrillation (H), Long-term (current) use of anticoagulants       ZOMETA IV      Inject into the vein every 30 days Every 3 month dosing        * Notice:  This list has 3 medication(s) that are the same as other medications prescribed for you. Read the directions carefully, and ask your doctor or other care provider to review them with you.

## 2018-06-06 NOTE — PROGRESS NOTES
ANTICOAGULATION FOLLOW-UP CLINIC VISIT    Patient Name:  Amira Arreola  Date:  6/6/2018  Contact Type:  Telephone    SUBJECTIVE:     Patient Findings     Positives No Problem Findings           OBJECTIVE    INR   Date Value Ref Range Status   06/06/2018 2.33 (H) 0.86 - 1.14 Final       ASSESSMENT / PLAN  INR assessment THER    Recheck INR In: 1 WEEK    INR Location Outside lab      Anticoagulation Summary as of 6/6/2018     INR goal 2.0-3.0   Today's INR 2.33   Warfarin maintenance plan 6 mg (4 mg x 1.5) on Wed; 4 mg (4 mg x 1) all other days   Full warfarin instructions 6 mg on Wed; 4 mg all other days   Weekly warfarin total 30 mg   No change documented Michelle Pinon RN   Plan last modified Aruna Fajardo RN (5/30/2018)   Next INR check 6/13/2018   Target end date Indefinite    Indications   Long-term (current) use of anticoagulants [Z79.01] [Z79.01]  Atrial fibrillation (H) [I48.91] (Resolved) [I48.91]         Anticoagulation Episode Summary     INR check location     Preferred lab     Send INR reminders to CS ANTICOAGULATION    Comments patient have standing INR order to be draw at infusion visit.  advise to call [facility] when have INR draw for dosing instruction.  patient can be reach at home phone [phone]      Anticoagulation Care Providers     Provider Role Specialty Phone number    Addy Frias MD [facility] [phone]            See the Encounter Report to view Anticoagulation Flowsheet and Dosing Calendar (Go to Encounters tab in chart review, and find the Anticoagulation Therapy Visit)    Dosage adjustment made based on physician directed care plan.  Left detailed VM for patient with instructions    Michelle Pinon RN

## 2018-06-06 NOTE — PROGRESS NOTES
Infusion Nursing Note:  Amira Arreola presents today for darzalex and velcade C14D1.    Patient seen by provider today: Yes: Dr Roberts   present during visit today: Not Applicable.    Note: N/A.    Intravenous Access:  Implanted Port.    Treatment Conditions:  Lab Results   Component Value Date    HGB 12.6 06/06/2018     Lab Results   Component Value Date    WBC 6.7 06/06/2018      Lab Results   Component Value Date    ANEU 6.2 06/06/2018     Lab Results   Component Value Date     06/06/2018      Lab Results   Component Value Date     06/13/2017                   Lab Results   Component Value Date    POTASSIUM 4.0 06/13/2017           No results found for: MAG         Lab Results   Component Value Date    CR 0.66 04/25/2018                   Lab Results   Component Value Date    BRADLEY 8.8 04/25/2018                Lab Results   Component Value Date    BILITOTAL 0.8 06/06/2018           Lab Results   Component Value Date    ALBUMIN 3.4 06/06/2018                    Lab Results   Component Value Date    ALT 27 06/06/2018           Lab Results   Component Value Date    AST 25 06/06/2018       Results reviewed, labs MET treatment parameters, ok to proceed with treatment.      Post Infusion Assessment:  Patient tolerated infusion without incident.  Site patent and intact, free from redness, edema or discomfort.  No evidence of extravasations.  Access discontinued per protocol.    Discharge Plan:   Patient and/or family verbalized understanding of discharge instructions and all questions answered.  Copy of AVS reviewed with patient and/or family.  Patient will return 6/13 for next appointment.  Patient discharged in stable condition accompanied by: self.  Departure Mode: Ambulatory.    Venice Gaspar RN

## 2018-06-06 NOTE — PROGRESS NOTES
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     1. On 09/21/2015, WBC of 4.2, hemoglobin of 13.2 and platelets of 138.    -On 09/29/2015, SPEP does not reveal any M-spike.   -On 10/02/2015, JANET does not reveal any monoclonal protein.     -On 10/22/2015, urine immunofixation reveals monoclonal free kappa light chain.    2. On 05/11/2016, kappa light chain of 50, lambda light chain of 0.32 and ratio of kappa to lambda of 156.2.  3. Bone marrow biopsy on 05/25/2016 reveals 40-50% kappa light chain restricted plasma cells.  Cytogenetics is normal. FISH panel reveals translocation 11;14.    4. MRI of bones on 06/21/2016 and 06/22/2016 reveals myeloma lesions.  5. On 08/24/2016, she was started on revlimid with dexamethasone 20 mg weekly. She did not have any significant response to treatment.   6. Velcade and dexamethasone started on 03/21/2017.    7. Daratumumab added on 05/31/2017.      SUBJECTIVE:    Mrs. Arreola is an 85-year-old female with kappa free light chain multiple myeloma.  She is on Velcade, dexamethasone and daratumumab.  Overall, she is tolerating it well.  Her kappa free light chain had decreased from 60 to 1.91.  Now it is slowly increasing.  It was 2.82 on 05/09/2018.      Patient has fatigue.  No headache.  Sometimes she has some dizziness.  No chest pain.  No difficulty breathing.  No nausea or vomiting.  Appetite is fairly good.  No urinary or bowel complaints.  She has chronic back pain.  No worsening of it.  She takes oxycodone. It helps with the pain.      Patient gets intermittent numbness and tingling.  It does not last all the time. It is there for a few days after Velcade and then goes away.  Lately she has some itching in the skin, mainly in the hands.      PHYSICAL EXAMINATION:   GENERAL:  She is alert, oriented x 3.   VITAL SIGNS:  Reviewed.    Rest of the systems not examined.      LABORATORY DATA:   Reviewed.      ASSESSMENT:   1.  An 85-year-old female with kappa free light chain multiple myeloma.   2.  Chronic back pain.   3.  Peripheral neuropathy.   4.  Fatigue.      PLAN:     1.  Patient is clinically stable from multiple myeloma.  I told her that her kappa free light chain is now slowly increasing.  Will await results from today.   She is on Velcade, dexamethasone and daratumumab.  We will continue on it.  If there is increase in kappa free light chain, my plan would be to stop Velcade and start her on pomalidomide.  The patient agreeable for this plan.   2.  Patient gets Zometa every 3 months, which will be continued.  She does not have any jaw-related symptoms or dental problems.   3.  She has chronic back pain.  She will continue on the oxycodone.   4.  She has fatigue.  This is due to multiple factors including her age, myeloma and treatment.  Advised her to be careful and avoid falls.     5.  I will see her in 4-6 weeks' time.  Advised her to see a physician sooner if she has worsening pain, fever, infection, worsening weakness or any other concerns.  She and her daughter had a few questions, which were all answered.     ADDENDUM: [place] free light chain is 1.46 today. Continue same treatment.     Total face-to-face time spent 25 minutes, most of time spent in counseling and coordination of care.         ITZ LOPEZ MD           D: 2018   T: 2018   MT: ROXANA      Name:     ALBERTO PARSON   MRN:      [id number]        Account:      [id number]   :      1932           Visit Date:   2018      Document: [id number]

## 2018-06-06 NOTE — MR AVS SNAPSHOT
Amiragino Arreola   6/6/2018   Anticoagulation Therapy Visit    Description:  85 year old female   Provider:  Addy Frias MD   Department:  [facility]           INR as of 6/6/2018     Today's INR 2.33      Anticoagulation Summary as of 6/6/2018     INR goal 2.0-3.0   Today's INR 2.33   Full warfarin instructions 6 mg on Wed; 4 mg all other days   Next INR check 6/13/2018    Indications   Long-term (current) use of anticoagulants [Z79.01] [Z79.01]  Atrial fibrillation (H) [I48.91] (Resolved) [I48.91]         June 2018 Details    Sun Mon Tue Wed Thu Fri Sat          1               2                 3               4               5               6      6 mg   See details      7      4 mg         8      4 mg         9      4 mg           10      4 mg         11      4 mg         12      4 mg         13            14               15               16                 17               18               19               20               21               22               23                 24               25               26               27               28               29               30                Date Details   06/06 This INR check       Date of next INR:  6/13/2018         How to take your warfarin dose     To take:  4 mg Take 1 of the 4 mg tablets.    To take:  6 mg Take 1.5 of the 4 mg tablets.

## 2018-06-06 NOTE — PROGRESS NOTES
"Oncology Rooming Note    June 6, 2018 10:27 AM   Amira Arreola is a 85 year old female who presents for:    Chief Complaint   Patient presents with     Oncology Clinic Visit     Multiple myeloma not having achieved remission      Initial Vitals: /88  Pulse 102  Temp 97.6  F (36.4  C) (Oral)  Resp 18  Wt 65.9 kg (145 lb 3.2 oz)  BMI 23.79 kg/m2 Estimated body mass index is 23.79 kg/(m^2) as calculated from the following:    Height as of 5/30/18: 1.664 m (5' 5.51\").    Weight as of this encounter: 65.9 kg (145 lb 3.2 oz). Body surface area is 1.75 meters squared.  No Pain (0) Comment: Data Unavailable   No LMP recorded. Patient is postmenopausal.  Allergies reviewed: Yes  Medications reviewed: Yes    Medications: Medication refills not needed today.  Pharmacy name entered into [email or web address]: [facility] [address] [place] [address] 7 AT [facility] [address]    Clinical concerns: None                4 minutes for nursing intake (face to face time)     Zora Bentley MA            "

## 2018-06-06 NOTE — MR AVS SNAPSHOT
After Visit Summary   6/6/2018    Amira Arreola    MRN: [id number]           Patient Information     Date Of Birth          7/17/1932        Visit Information        Provider Department      6/6/2018 10:00 AM Shayne Roberts MD [facility]        Today's Diagnoses     Multiple myeloma not having achieved remission (H)    -  1      Care Instructions    Continue chemotherapy.    Scheduled - Sarah  Follow up in 4-6 weeks.   Scheduled - Sarah    The patient is in [facility]          Follow-ups after your visit        Your next 10 appointments already scheduled     Jun 13, 2018  9:30 AM CDT   Level 2 with SH INFUSION CHAIR 9   [facility] ([facility])    [facility]  [address]  [place] [address]   [phone]            Jun 20, 2018  9:30 AM CDT   Level 2 with SH INFUSION CHAIR 15   [facility] ([facility])    [facility]  [address]  [place] [address]   [phone]            Jun 27, 2018  9:30 AM CDT   Level 2 with SH INFUSION CHAIR 20   [facility] ([facility])    [facility]  [address]  [place] [address]   [phone]            Jul 03, 2018  9:30 AM CDT   Level 5 with SH INFUSION CHAIR 15   [facility] ([facility])    [facility]  [address]  [place] [address]   [phone]            Jul 11, 2018  9:30 AM CDT   Level 2 with SH INFUSION CHAIR 20   [facility] ([facility])    [facility]  [address]  [place] [address]   [phone]            Jul 18, 2018  9:30 AM CDT   Level 2 with SH INFUSION CHAIR 15   [facility] ([place]  [facility])    [facility] [place] [facility]  [address]  [place] [address]   [phone]            Jul 18, 2018 10:40 AM CDT   Return Visit with Shayne Roberts MD   [facility] ([facility])    [facility] [place]  [address]  [place] [address] [phone]              Who to contact     If you have questions or need follow up information about today's clinic visit or your schedule please contact [facility] directly at [phone].  Normal or non-critical lab and imaging results will be communicated to you by MyChart, letter or phone within 4 business days after the clinic has received the results. If you do not hear from us within 7 days, please contact the clinic through MyChart or phone. If you have a critical or abnormal lab result, we will notify you by phone as soon as possible.  Submit refill requests through [employer] or call your pharmacy and they will forward the refill request to us. Please allow 3 business days for your refill to be completed.          Additional Information About Your Visit        Care EveryWhere ID     This is your Care EveryWhere ID. This could be used by other organizations to access your [place] medical records  [id number]        Your Vitals Were     Pulse Temperature Respirations BMI (Body Mass Index)          102 97.6  F (36.4  C) (Oral) 18 23.79 kg/m2         Blood Pressure from Last 3 Encounters:   06/06/18 138/88   06/06/18 138/88   05/30/18 135/71    Weight from Last 3 Encounters:   06/06/18 65.9 kg (145 lb 3.2 oz)   06/06/18 65.9 kg (145 lb 3.2 oz)   05/30/18 68.9 kg (151 lb 12.8 oz)              Today, you had the following     No orders found for display         Today's Medication Changes          These changes are accurate as of 6/6/18 11:37 AM.  If you have any questions, ask your nurse or doctor.               These medicines have changed or have updated prescriptions.         Dose/Directions    * dexamethasone 4 MG tablet   Commonly known as:  DECADRON   This may have changed:  Another medication with the same name was added. Make sure you understand how and when to take each.   Used for:  Multiple myeloma not having achieved remission (H)        Take 20mg (5 tablets) by mouth every week on the morning of velcade injection.   Quantity:  28 tablet   Refills:  0       * dexamethasone 4 MG tablet   Commonly known as:  DECADRON   This may have changed:  Another medication with the same name was added. Make sure you understand how and when to take each.   Used for:  Multiple myeloma not having achieved remission (H)        Take 20mg (5 tablets) by mouth every week on the morning of velcade injection.   Quantity:  28 tablet   Refills:  0       * dexamethasone 4 MG tablet   Commonly known as:  DECADRON   This may have changed:  You were already taking a medication with the same name, and this prescription was added. Make sure you understand how and when to take each.   Used for:  Multiple myeloma not having achieved remission (H)        Take 20mg (5 tablets) by mouth every week on the morning of velcade injection.   Quantity:  28 tablet   Refills:  0       * Notice:  This list has 3 medication(s) that are the same as other medications prescribed for you. Read the directions carefully, and ask your doctor or other care provider to review them with you.         Where to get your medicines      These medications were sent to [facility] [address] [place] [address], [place] - [address] [facility] [address]  [address], [facility] [address]     Phone:  [phone]     dexamethasone 4 MG tablet                Primary Care Provider Office Phone # Fax #    Addy Frias [phone] [address] [facility] [address]  [facility] [address]        Equal Access to Services     SARA ZELAYA AH: Gissel Galloway, rhonda gutierrez, hung albert  lópez liusergio dunn'aan ah. So [facility] [place] [facility] [phone].    ATENCIÓN: Si feli park, tiene a barlow disposición servicios gratuitos de asistencia lingüística. Kadie al [phone].    We comply with applicable federal civil rights laws and [place] laws. We do not discriminate on the basis of race, color, national origin, age, disability, sex, sexual orientation, or gender identity.            Thank you!     Thank you for choosing [facility]  for your care. Our goal is always to provide you with excellent care. Hearing back from our patients is one way we can continue to improve our services. Please take a few minutes to complete the written survey that you may receive in the mail after your visit with us. Thank you!             Your Updated Medication List - Protect others around you: Learn how to safely use, store and throw away your medicines at www.disposemymeds.org.          This list is accurate as of 6/6/18 11:37 AM.  Always use your most recent med list.                   Brand Name Dispense Instructions for use Diagnosis    acyclovir 400 MG tablet    ZOVIRAX    60 tablet    Take 1 tablet (400 mg) by mouth 2 times daily Start 1 week prior to daratumumab and continue for 3 months after treatment is complete.    Multiple myeloma not having achieved remission (H)       CALCIUM CITRATE + PO      Take 2,000 mg by mouth daily 2 tabs        carboxymethylcellulose 0.5 % Soln ophthalmic solution    REFRESH PLUS     1 drop 4 times daily        CLARINEX PO      Take by mouth daily Taking claritin        COMPAZINE PO      Take 10 mg by mouth daily as needed        cycloSPORINE 0.05 % ophthalmic emulsion    RESTASIS     Place 1 drop into both eyes every 12 hours        DAILY MULTIVITAMIN PO      Take 1 tablet by mouth daily.    Routine general medical examination at a health care facility       * dexamethasone 4 MG tablet    DECADRON    28 tablet    Take 20mg (5 tablets) by mouth every week on the morning  of velcade injection.    Multiple myeloma not having achieved remission (H)       * dexamethasone 4 MG tablet    DECADRON    28 tablet    Take 20mg (5 tablets) by mouth every week on the morning of velcade injection.    Multiple myeloma not having achieved remission (H)       * dexamethasone 4 MG tablet    DECADRON    28 tablet    Take 20mg (5 tablets) by mouth every week on the morning of velcade injection.    Multiple myeloma not having achieved remission (H)       erythromycin ophthalmic ointment    ROMYCIN     Place 1 Application into both eyes At Bedtime        lidocaine-prilocaine cream    EMLA    30 g    Apply topically as needed for moderate pain Apply dollop size amount to port site 30-60 min prior to accessing    Multiple myeloma not having achieved remission (H)       LORazepam 0.5 MG tablet    ATIVAN    30 tablet    Take 1 tablet (0.5 mg) by mouth every 8 hours as needed for anxiety    Multiple myeloma not having achieved remission (H)       metoprolol succinate 50 MG 24 hr tablet    TOPROL-XL    270 tablet    TAKE 2 TABLETS BY MOUTH EVERY MORNING, AND 1 TABLET EVERY EVENING    Paroxysmal atrial fibrillation (H)       oxyCODONE IR 15 MG tablet    ROXICODONE    60 tablet    Take 1 tablet (15 mg) by mouth every 8 hours as needed for pain maximum 4 tablet(s) per day    Multiple myeloma not having achieved remission (H)       polyethylene glycol powder    MIRALAX/GLYCOLAX     Take 1 capful by mouth daily as needed    Bilateral leg edema       SIMBRINZA 1-0.2 % ophthalmic suspension   Generic drug:  brinzolamide-brimonidine      Place 1 drop into the right eye 2 times daily 1 drop AM and PM        triamcinolone 0.5 % cream    KENALOG    15 g    Apply sparingly to affected area three times daily. 1-2 weeks , as needed    Rash and nonspecific skin eruption       TYLENOL PO      Take 500 mg by mouth every 6 hours as needed for mild pain or fever        UNABLE TO FIND      MEDICATION NAME: Fresh Coat eye drops         VITAMIN D3 PO      Take 1,000 Units by mouth daily        warfarin 4 MG tablet    COUMADIN    110 tablet    TAKE 1-2 TABLETS BY MOUTH EVERY DAY AS DIRECTED BY INR CLINIC    Paroxysmal atrial fibrillation (H), Long-term (current) use of anticoagulants       ZOMETA IV      Inject into the vein every 30 days Every 3 month dosing        * Notice:  This list has 3 medication(s) that are the same as other medications prescribed for you. Read the directions carefully, and ask your doctor or other care provider to review them with you.

## 2018-06-06 NOTE — PATIENT INSTRUCTIONS
Continue chemotherapy.    Scheduled - Sarah  Follow up in 4-6 weeks.   Scheduled - Sarah    The patient is in [facility]

## 2018-06-07 ENCOUNTER — ANTICOAGULATION THERAPY VISIT (OUTPATIENT)
Dept: FAMILY MEDICINE | Facility: CLINIC | Age: 83
End: 2018-06-07

## 2018-06-07 DIAGNOSIS — Z79.01 LONG-TERM (CURRENT) USE OF ANTICOAGULANTS: ICD-10-CM

## 2018-06-07 LAB
ALBUMIN SERPL ELPH-MCNC: 3.8 G/DL (ref 3.7–5.1)
ALPHA1 GLOB SERPL ELPH-MCNC: 0.4 G/DL (ref 0.2–0.4)
ALPHA2 GLOB SERPL ELPH-MCNC: 0.7 G/DL (ref 0.5–0.9)
B-GLOBULIN SERPL ELPH-MCNC: 0.6 G/DL (ref 0.6–1)
GAMMA GLOB SERPL ELPH-MCNC: 0.2 G/DL (ref 0.7–1.6)
M PROTEIN SERPL ELPH-MCNC: 0.1 G/DL
PROT PATTERN SERPL ELPH-IMP: ABNORMAL

## 2018-06-07 NOTE — PROGRESS NOTES
ANTICOAGULATION FOLLOW-UP CLINIC VISIT    Patient Name:  Amira Arreola  Date:  6/7/2018  Contact Type:  Telephone/ spoke with pt.    SUBJECTIVE:     Patient Findings     Positives No Problem Findings           OBJECTIVE    INR   Date Value Ref Range Status   06/06/2018 2.33 (H) 0.86 - 1.14 Final       ASSESSMENT / PLAN  INR assessment THER    Recheck INR In: 1 WEEK    INR Location Outside lab      Anticoagulation Summary as of 6/6/2018     INR goal 2.0-3.0   Today's INR 2.33   Warfarin maintenance plan 4 mg (4 mg x 1) every day   Full warfarin instructions 6/6: 4 mg; Otherwise 4 mg every day   Weekly warfarin total 28 mg   Plan last modified Bri Mcbride RN (6/7/2018)   Next INR check 6/13/2018   Target end date Indefinite    Indications   Long-term (current) use of anticoagulants [Z79.01] [Z79.01]  Atrial fibrillation (H) [I48.91] (Resolved) [I48.91]         Anticoagulation Episode Summary     INR check location     Preferred lab     Send INR reminders to CS ANTICOAGULATION    Comments patient have standing INR order to be draw at infusion visit.  advise to call [facility] when have INR draw for dosing instruction.  patient can be reach at home phone [phone]      Anticoagulation Care Providers     Provider Role Specialty Phone number    Addy Frias MD [facility] Internal Medicine [phone]            See the Encounter Report to view Anticoagulation Flowsheet and Dosing Calendar (Go to Encounters tab in chart review, and find the Anticoagulation Therapy Visit)    Dosage adjustment made based on physician directed care plan.  Patient called clinic today asking to speak with an INR nurse about yesterday's INR and Warfarin dosing instructions.  Reports taking 4mg daily.  Patient has not been taking 6mg on Wed as directed.  Says she will go ahead with 6mg on Wed only if she eats A LOT of greens, however, she has had a poor appetite and not been eating much of anything.  Patient states being  "concerned about \"bleeding\"---saying she doesn't want to \"bleed all over everything\" so has been taking 4mg daily.   Info on flowsheet updated to reflect patient's actual dosing.     Bri Mcbride RN               "

## 2018-06-08 LAB
KAPPA LC UR-MCNC: 1.46 MG/DL (ref 0.33–1.94)
KAPPA LC/LAMBDA SER: ABNORMAL {RATIO} (ref 0.26–1.65)
LAMBDA LC SERPL-MCNC: <0.25 MG/DL (ref 0.57–2.63)

## 2018-06-13 ENCOUNTER — HOSPITAL ENCOUNTER (OUTPATIENT)
Facility: CLINIC | Age: 83
Setting detail: SPECIMEN
Discharge: HOME | End: 2018-06-13
Attending: INTERNAL MEDICINE | Admitting: INTERNAL MEDICINE
Payer: MEDICARE

## 2018-06-13 ENCOUNTER — ANTICOAGULATION THERAPY VISIT (OUTPATIENT)
Dept: FAMILY MEDICINE | Facility: CLINIC | Age: 83
End: 2018-06-13
Payer: COMMERCIAL

## 2018-06-13 ENCOUNTER — TELEPHONE (OUTPATIENT)
Dept: FAMILY MEDICINE | Facility: CLINIC | Age: 83
End: 2018-06-13

## 2018-06-13 ENCOUNTER — INFUSION THERAPY VISIT (OUTPATIENT)
Dept: INFUSION THERAPY | Facility: CLINIC | Age: 83
End: 2018-06-13
Attending: INTERNAL MEDICINE
Payer: MEDICARE

## 2018-06-13 VITALS
BODY MASS INDEX: 24.02 KG/M2 | DIASTOLIC BLOOD PRESSURE: 92 MMHG | OXYGEN SATURATION: 98 % | RESPIRATION RATE: 20 BRPM | TEMPERATURE: 98.3 F | HEART RATE: 76 BPM | WEIGHT: 146.6 LBS | SYSTOLIC BLOOD PRESSURE: 151 MMHG

## 2018-06-13 DIAGNOSIS — C90.00 MULTIPLE MYELOMA NOT HAVING ACHIEVED REMISSION (H): Primary | ICD-10-CM

## 2018-06-13 DIAGNOSIS — Z79.01 LONG-TERM (CURRENT) USE OF ANTICOAGULANTS: ICD-10-CM

## 2018-06-13 DIAGNOSIS — Z95.828 PORTACATH IN PLACE: ICD-10-CM

## 2018-06-13 DIAGNOSIS — I48.0 PAROXYSMAL ATRIAL FIBRILLATION (H): ICD-10-CM

## 2018-06-13 LAB
BASOPHILS # BLD AUTO: 0 10E9/L (ref 0–0.2)
BASOPHILS NFR BLD AUTO: 0.2 %
DIFFERENTIAL METHOD BLD: ABNORMAL
EOSINOPHIL # BLD AUTO: 0.1 10E9/L (ref 0–0.7)
EOSINOPHIL NFR BLD AUTO: 2 %
ERYTHROCYTE [DISTWIDTH] IN BLOOD BY AUTOMATED COUNT: 14.4 % (ref 10–15)
HCT VFR BLD AUTO: 36.3 % (ref 35–47)
HGB BLD-MCNC: 11.9 G/DL (ref 11.7–15.7)
IMM GRANULOCYTES # BLD: 0 10E9/L (ref 0–0.4)
IMM GRANULOCYTES NFR BLD: 0.2 %
INR PPP: 2.89
INR PPP: 2.89 (ref 0.86–1.14)
LYMPHOCYTES # BLD AUTO: 0.9 10E9/L (ref 0.8–5.3)
LYMPHOCYTES NFR BLD AUTO: 13.5 %
MCH RBC QN AUTO: 32.4 PG (ref 26.5–33)
MCHC RBC AUTO-ENTMCNC: 32.8 G/DL (ref 31.5–36.5)
MCV RBC AUTO: 99 FL (ref 78–100)
MONOCYTES # BLD AUTO: 1 10E9/L (ref 0–1.3)
MONOCYTES NFR BLD AUTO: 15.3 %
NEUTROPHILS # BLD AUTO: 4.5 10E9/L (ref 1.6–8.3)
NEUTROPHILS NFR BLD AUTO: 68.8 %
NRBC # BLD AUTO: 0 10*3/UL
NRBC BLD AUTO-RTO: 0 /100
PLATELET # BLD AUTO: 132 10E9/L (ref 150–450)
RBC # BLD AUTO: 3.67 10E12/L (ref 3.8–5.2)
WBC # BLD AUTO: 6.5 10E9/L (ref 4–11)

## 2018-06-13 PROCEDURE — 85610 PROTHROMBIN TIME: CPT | Performed by: INTERNAL MEDICINE

## 2018-06-13 PROCEDURE — 85025 COMPLETE CBC W/AUTO DIFF WBC: CPT | Performed by: INTERNAL MEDICINE

## 2018-06-13 PROCEDURE — 96401 CHEMO ANTI-NEOPL SQ/IM: CPT

## 2018-06-13 PROCEDURE — 25000128 H RX IP 250 OP 636: Performed by: INTERNAL MEDICINE

## 2018-06-13 PROCEDURE — 99207 ZZC NO CHARGE NURSE ONLY: CPT | Performed by: INTERNAL MEDICINE

## 2018-06-13 RX ORDER — HEPARIN SODIUM (PORCINE) LOCK FLUSH IV SOLN 100 UNIT/ML 100 UNIT/ML
500 SOLUTION INTRAVENOUS EVERY 8 HOURS
Status: DISCONTINUED | OUTPATIENT
Start: 2018-06-13 | End: 2018-06-13 | Stop reason: HOSPADM

## 2018-06-13 RX ORDER — HEPARIN SODIUM (PORCINE) LOCK FLUSH IV SOLN 100 UNIT/ML 100 UNIT/ML
500 SOLUTION INTRAVENOUS EVERY 8 HOURS
Status: CANCELLED
Start: 2018-06-13

## 2018-06-13 RX ADMIN — SODIUM CHLORIDE, PRESERVATIVE FREE 500 UNITS: 5 INJECTION INTRAVENOUS at 09:58

## 2018-06-13 RX ADMIN — BORTEZOMIB 2.3 MG: 3.5 INJECTION, POWDER, LYOPHILIZED, FOR SOLUTION INTRAVENOUS; SUBCUTANEOUS at 11:14

## 2018-06-13 NOTE — PROGRESS NOTES
ANTICOAGULATION FOLLOW-UP CLINIC VISIT    Patient Name:  Amira Arreola  Date:  6/13/2018  Contact Type:  Telephone/ Dose instructions given to patient    SUBJECTIVE:     Patient Findings     Positives No Problem Findings           OBJECTIVE    INR   Date Value Ref Range Status   06/13/2018 2.89 (H) 0.86 - 1.14 Final       ASSESSMENT / PLAN  INR assessment THER    Recheck INR In: 1 WEEK    INR Location Outside lab      Anticoagulation Summary as of 6/13/2018     INR goal 2.0-3.0   Today's INR 2.89   Warfarin maintenance plan 4 mg (4 mg x 1) every day   Full warfarin instructions 4 mg every day   Weekly warfarin total 28 mg   No change documented Cintia Powers, RN   Plan last modified Bri Mcbride RN (6/7/2018)   Next INR check 6/20/2018   Target end date Indefinite    Indications   Long-term (current) use of anticoagulants [Z79.01] [Z79.01]  Atrial fibrillation (H) [I48.91] (Resolved) [I48.91]         Anticoagulation Episode Summary     INR check location     Preferred lab     Send INR reminders to CS ANTICOAGULATION    Comments patient have standing INR order to be draw at infusion visit.  advise to call [facility] when have INR draw for dosing instruction.  patient can be reach at home phone [phone]      Anticoagulation Care Providers     Provider Role Specialty Phone number    Addy Frias MD [facility] Internal Medicine [phone]            See the Encounter Report to view Anticoagulation Flowsheet and Dosing Calendar (Go to Encounters tab in chart review, and find the Anticoagulation Therapy Visit)    Dosage adjustment made based on physician directed care plan.    Cintia Powers RN

## 2018-06-13 NOTE — MR AVS SNAPSHOT
After Visit Summary   6/13/2018    Amira Arreola    MRN: [id number]           Patient Information     Date Of Birth          7/17/1932        Visit Information        Provider Department      6/13/2018 9:30 AM SH INFUSION CHAIR 9 [facility]        Today's Diagnoses     Multiple myeloma not having achieved remission (H)    -  1    Portacath in place        Paroxysmal atrial fibrillation (H)           Follow-ups after your visit        Your next 10 appointments already scheduled     Jun 20, 2018  9:30 AM CDT   Level 2 with SH INFUSION CHAIR 15   [facility] ([facility])    [facility]  [address]  [place] [address]   [phone]            Jun 27, 2018  9:30 AM CDT   Level 2 with SH INFUSION CHAIR 20   [facility] ([facility])    [facility]  [address]  [place] [address]   [phone]            Jul 03, 2018  9:30 AM CDT   Level 5 with SH INFUSION CHAIR 15   [facility] ([facility])    [facility]  [address]  [place] [address]   [phone]            Jul 11, 2018  9:30 AM CDT   Level 2 with SH INFUSION CHAIR 20   [facility] ([facility])    [facility]  [address]  [place] [address]   [phone]            Jul 18, 2018  9:30 AM CDT   Level 2 with SH INFUSION CHAIR 15   [facility] ([facility])    [facility]  [address]  [place] [address]   [phone]            Jul 18, 2018 10:40 AM CDT   Return Visit with Shayne Roberts MD   [facility]    [facility]  [address]  [place] [address] [phone]              Who to contact     If you have questions or need follow up information about today's clinic visit or your schedule please contact [facility] directly at [phone].  Normal or non-critical lab and imaging results will be communicated to you by MyChart, letter or phone within 4 business days after the clinic has received the results. If you do not hear from us within 7 days, please contact the clinic through MyChart or phone. If you have a critical or abnormal lab result, we will notify you by phone as soon as possible.  Submit refill requests through [employer] or call your pharmacy and they will forward the refill request to us. Please allow 3 business days for your refill to be completed.          Additional Information About Your Visit        Care EveryWhere ID     This is your Care EveryWhere ID. This could be used by other organizations to access your [place] medical records  [id number]        Your Vitals Were     Pulse Temperature Respirations Pulse Oximetry BMI (Body Mass Index)       76 98.3  F (36.8  C) (Oral) 20 98% 24.02 kg/m2        Blood Pressure from Last 3 Encounters:   06/13/18 (!) 151/92   06/06/18 138/88   06/06/18 138/88    Weight from Last 3 Encounters:   06/13/18 66.5 kg (146 lb 9.6 oz)   06/06/18 65.9 kg (145 lb 3.2 oz)   06/06/18 65.9 kg (145 lb 3.2 oz)              We Performed the Following     CBC with platelets differential     INR        Primary Care Provider Office Phone # Fax #    Addy Frias [phone] [address]  [facility] [address]        Equal Access to Services     HARINI ZELAYA : Hadmicaela Galloway, rhonda gutierrez, hung albert. So [facility] [phone].    ATENCIÓN: Si habla español, tiene a barlow disposición servicios gratuitos de asistencia lingüística. Llame al [phone].    We comply  with applicable federal civil rights laws and [place] laws. We do not discriminate on the basis of race, color, national origin, age, disability, sex, sexual orientation, or gender identity.            Thank you!     Thank you for choosing [facility]  for your care. Our goal is always to provide you with excellent care. Hearing back from our patients is one way we can continue to improve our services. Please take a few minutes to complete the written survey that you may receive in the mail after your visit with us. Thank you!             Your Updated Medication List - Protect others around you: Learn how to safely use, store and throw away your medicines at www.disposemymeds.org.          This list is accurate as of 6/13/18 11:18 AM.  Always use your most recent med list.                   Brand Name Dispense Instructions for use Diagnosis    acyclovir 400 MG tablet    ZOVIRAX    60 tablet    Take 1 tablet (400 mg) by mouth 2 times daily Start 1 week prior to daratumumab and continue for 3 months after treatment is complete.    Multiple myeloma not having achieved remission (H)       CALCIUM CITRATE + PO      Take 2,000 mg by mouth daily 2 tabs        carboxymethylcellulose 0.5 % Soln ophthalmic solution    REFRESH PLUS     1 drop 4 times daily        CLARINEX PO      Take by mouth daily Taking claritin        COMPAZINE PO      Take 10 mg by mouth daily as needed        cycloSPORINE 0.05 % ophthalmic emulsion    RESTASIS     Place 1 drop into both eyes every 12 hours        DAILY MULTIVITAMIN PO      Take 1 tablet by mouth daily.    Routine general medical examination at a health care facility       * dexamethasone 4 MG tablet    DECADRON    28 tablet    Take 20mg (5 tablets) by mouth every week on the morning of velcade injection.    Multiple myeloma not having achieved remission (H)       * dexamethasone 4 MG tablet    DECADRON    28 tablet    Take 20mg (5 tablets) by mouth every  week on the morning of velcade injection.    Multiple myeloma not having achieved remission (H)       * dexamethasone 4 MG tablet    DECADRON    28 tablet    Take 20mg (5 tablets) by mouth every week on the morning of velcade injection.    Multiple myeloma not having achieved remission (H)       erythromycin ophthalmic ointment    ROMYCIN     Place 1 Application into both eyes At Bedtime        lidocaine-prilocaine cream    EMLA    30 g    Apply topically as needed for moderate pain Apply dollop size amount to port site 30-60 min prior to accessing    Multiple myeloma not having achieved remission (H)       LORazepam 0.5 MG tablet    ATIVAN    30 tablet    Take 1 tablet (0.5 mg) by mouth every 8 hours as needed for anxiety    Multiple myeloma not having achieved remission (H)       metoprolol succinate 50 MG 24 hr tablet    TOPROL-XL    270 tablet    TAKE 2 TABLETS BY MOUTH EVERY MORNING, AND 1 TABLET EVERY EVENING    Paroxysmal atrial fibrillation (H)       oxyCODONE IR 15 MG tablet    ROXICODONE    60 tablet    Take 1 tablet (15 mg) by mouth every 8 hours as needed for pain maximum 4 tablet(s) per day    Multiple myeloma not having achieved remission (H)       polyethylene glycol powder    MIRALAX/GLYCOLAX     Take 1 capful by mouth daily as needed    Bilateral leg edema       SIMBRINZA 1-0.2 % ophthalmic suspension   Generic drug:  brinzolamide-brimonidine      Place 1 drop into the right eye 2 times daily 1 drop AM and PM        triamcinolone 0.5 % cream    KENALOG    15 g    Apply sparingly to affected area three times daily. 1-2 weeks , as needed    Rash and nonspecific skin eruption       TYLENOL PO      Take 500 mg by mouth every 6 hours as needed for mild pain or fever        UNABLE TO FIND      MEDICATION NAME: Fresh Coat eye drops        VITAMIN D3 PO      Take 1,000 Units by mouth daily        warfarin 4 MG tablet    COUMADIN    110 tablet    TAKE 1-2 TABLETS BY MOUTH EVERY DAY AS DIRECTED BY INR CLINIC     Paroxysmal atrial fibrillation (H), Long-term (current) use of anticoagulants       ZOMETA IV      Inject into the vein every 30 days Every 3 month dosing        * Notice:  This list has 3 medication(s) that are the same as other medications prescribed for you. Read the directions carefully, and ask your doctor or other care provider to review them with you.

## 2018-06-13 NOTE — MR AVS SNAPSHOT
Amira Arreola   6/13/2018   Anticoagulation Therapy Visit    Description:  85 year old female   Provider:  Addy Frias MD   Department:  [facility]           INR as of 6/13/2018     Today's INR 2.89      Anticoagulation Summary as of 6/13/2018     INR goal 2.0-3.0   Today's INR 2.89   Full warfarin instructions 4 mg every day   Next INR check 6/20/2018    Indications   Long-term (current) use of anticoagulants [Z79.01] [Z79.01]  Atrial fibrillation (H) [I48.91] (Resolved) [I48.91]         June 2018 Details    Sun Mon Tue Wed Thu Fri Sat          1               2                 3               4               5               6               7               8               9                 10               11               12               13      4 mg   See details      14      4 mg         15      4 mg         16      4 mg           17      4 mg         18      4 mg         19      4 mg         20            21               22               23                 24               25               26               27               28               29               30                Date Details   06/13 This INR check       Date of next INR:  6/20/2018         How to take your warfarin dose     To take:  4 mg Take 1 of the 4 mg tablets.

## 2018-06-13 NOTE — PROGRESS NOTES
Infusion Nursing Note:  Amira Arreola presents today for Cycle 14 Day 8 Velcade.    Patient seen by provider today: No   present during visit today: Not Applicable.    Note: N/A.    Intravenous Access:  Implanted Port.    Treatment Conditions:  Lab Results   Component Value Date    HGB 11.9 06/13/2018     Lab Results   Component Value Date    WBC 6.5 06/13/2018      Lab Results   Component Value Date    ANEU 4.5 06/13/2018     Lab Results   Component Value Date     06/13/2018      Results reviewed, labs MET treatment parameters, ok to proceed with treatment.      Post Infusion Assessment:  Patient tolerated injection without incident.    Discharge Plan:   Patient declined prescription refills.  Discharge instructions reviewed with: Patient.  Patient verbalized understanding of discharge instructions and all questions answered.  Copy of AVS reviewed with patient.  Patient will return 6/20/18 for next appointment.  Patient discharged in stable condition accompanied by: self.  Departure Mode: Ambulatory.    Senia Juan RN

## 2018-06-20 ENCOUNTER — INFUSION THERAPY VISIT (OUTPATIENT)
Dept: INFUSION THERAPY | Facility: CLINIC | Age: 83
End: 2018-06-20
Attending: INTERNAL MEDICINE
Payer: MEDICARE

## 2018-06-20 ENCOUNTER — HOSPITAL ENCOUNTER (OUTPATIENT)
Facility: CLINIC | Age: 83
Setting detail: SPECIMEN
Discharge: HOME | End: 2018-06-20
Attending: INTERNAL MEDICINE | Admitting: INTERNAL MEDICINE
Payer: MEDICARE

## 2018-06-20 VITALS
BODY MASS INDEX: 23.18 KG/M2 | WEIGHT: 144.2 LBS | OXYGEN SATURATION: 97 % | TEMPERATURE: 97.5 F | DIASTOLIC BLOOD PRESSURE: 81 MMHG | RESPIRATION RATE: 20 BRPM | SYSTOLIC BLOOD PRESSURE: 139 MMHG | HEART RATE: 98 BPM | HEIGHT: 66 IN

## 2018-06-20 DIAGNOSIS — C90.00 MULTIPLE MYELOMA NOT HAVING ACHIEVED REMISSION (H): Primary | ICD-10-CM

## 2018-06-20 LAB
BASOPHILS # BLD AUTO: 0 10E9/L (ref 0–0.2)
BASOPHILS NFR BLD AUTO: 0 %
DIFFERENTIAL METHOD BLD: ABNORMAL
EOSINOPHIL # BLD AUTO: 0 10E9/L (ref 0–0.7)
EOSINOPHIL NFR BLD AUTO: 0 %
ERYTHROCYTE [DISTWIDTH] IN BLOOD BY AUTOMATED COUNT: 14.1 % (ref 10–15)
HCT VFR BLD AUTO: 35.9 % (ref 35–47)
HGB BLD-MCNC: 11.9 G/DL (ref 11.7–15.7)
IMM GRANULOCYTES # BLD: 0 10E9/L (ref 0–0.4)
IMM GRANULOCYTES NFR BLD: 0.2 %
LYMPHOCYTES # BLD AUTO: 0.3 10E9/L (ref 0.8–5.3)
LYMPHOCYTES NFR BLD AUTO: 6.3 %
MCH RBC QN AUTO: 32.6 PG (ref 26.5–33)
MCHC RBC AUTO-ENTMCNC: 33.1 G/DL (ref 31.5–36.5)
MCV RBC AUTO: 98 FL (ref 78–100)
MONOCYTES # BLD AUTO: 0 10E9/L (ref 0–1.3)
MONOCYTES NFR BLD AUTO: 1 %
NEUTROPHILS # BLD AUTO: 3.8 10E9/L (ref 1.6–8.3)
NEUTROPHILS NFR BLD AUTO: 92.5 %
NRBC # BLD AUTO: 0 10*3/UL
NRBC BLD AUTO-RTO: 0 /100
PLATELET # BLD AUTO: 132 10E9/L (ref 150–450)
RBC # BLD AUTO: 3.65 10E12/L (ref 3.8–5.2)
WBC # BLD AUTO: 4.1 10E9/L (ref 4–11)

## 2018-06-20 PROCEDURE — 85025 COMPLETE CBC W/AUTO DIFF WBC: CPT | Performed by: INTERNAL MEDICINE

## 2018-06-20 PROCEDURE — 25000128 H RX IP 250 OP 636: Mod: JW | Performed by: INTERNAL MEDICINE

## 2018-06-20 PROCEDURE — 96401 CHEMO ANTI-NEOPL SQ/IM: CPT

## 2018-06-20 RX ORDER — HEPARIN SODIUM (PORCINE) LOCK FLUSH IV SOLN 100 UNIT/ML 100 UNIT/ML
5 SOLUTION INTRAVENOUS EVERY 8 HOURS
Status: DISCONTINUED | OUTPATIENT
Start: 2018-06-20 | End: 2018-06-20 | Stop reason: HOSPADM

## 2018-06-20 RX ADMIN — SODIUM CHLORIDE, PRESERVATIVE FREE 5 ML: 5 INJECTION INTRAVENOUS at 11:03

## 2018-06-20 RX ADMIN — BORTEZOMIB 2.3 MG: 3.5 INJECTION, POWDER, LYOPHILIZED, FOR SOLUTION INTRAVENOUS; SUBCUTANEOUS at 11:02

## 2018-06-20 NOTE — MR AVS SNAPSHOT
After Visit Summary   6/20/2018    Amira Arreola    MRN: [id number]           Patient Information     Date Of Birth          7/17/1932        Visit Information        Provider Department      6/20/2018 9:30 AM SH INFUSION CHAIR 15 [facility]        Today's Diagnoses     Multiple myeloma not having achieved remission (H)    -  1       Follow-ups after your visit        Follow-up notes from your care team     Return in 7 days (on 6/27/2018).      Your next 10 appointments already scheduled     Jun 27, 2018  9:30 AM CDT   Level 2 with SH INFUSION CHAIR 20   [facility] ([facility])    [facility]  [address]  [place] [address]   [phone]            Jul 03, 2018  9:30 AM CDT   Level 5 with SH INFUSION CHAIR 15   [facility] [place] ([facility])    [facility]  [address]  [place] [address]   [phone]            Jul 11, 2018  9:30 AM CDT   Level 2 with SH INFUSION CHAIR 20   [facility] [place] ([facility])    [facility]  [address]  [place] [address]   [phone]            Jul 18, 2018  9:30 AM CDT   Level 2 with SH INFUSION CHAIR 15   [facility] ([facility])    [facility]  [address]  [place] [address]   [phone]            Jul 18, 2018 10:40 AM CDT   Return Visit with Shayne Roberts MD   [facility] ([facility])    [facility]  [address]  [place] [address]   [phone]              Who to contact     If you have questions or need follow up information about today's clinic visit or your schedule please contact [facility] directly at  "[phone].  Normal or non-critical lab and imaging results will be communicated to you by MyChart, letter or phone within 4 business days after the clinic has received the results. If you do not hear from us within 7 days, please contact the clinic through MyChart or phone. If you have a critical or abnormal lab result, we will notify you by phone as soon as possible.  Submit refill requests through [employer] or call your pharmacy and they will forward the refill request to us. Please allow 3 business days for your refill to be completed.          Additional Information About Your Visit        Care EveryWhere ID     This is your Care EveryWhere ID. This could be used by other organizations to access your [place] medical records  [id number]        Your Vitals Were     Pulse Temperature Respirations Height Pulse Oximetry BMI (Body Mass Index)    98 97.5  F (36.4  C) (Oral) 20 1.664 m (5' 5.51\") 97% 23.62 kg/m2       Blood Pressure from Last 3 Encounters:   06/20/18 139/81   06/13/18 (!) 151/92   06/06/18 138/88    Weight from Last 3 Encounters:   06/20/18 65.4 kg (144 lb 3.2 oz)   06/13/18 66.5 kg (146 lb 9.6 oz)   06/06/18 65.9 kg (145 lb 3.2 oz)              We Performed the Following     CBC with platelets differential        Primary Care Provider Office Phone # Fax #    Addy Sean Frias [phone] [address] [facility] [address]  [facility] [address]        Equal Access to Services     [facility] Hadii aad ku hadasho Soyair, waaxda luqadaha, qaybta kaalmada alonso, hung sadler. So [facility] [phone].    ATENCIÓN: Si habla español, tiene a barlow disposición servicios gratuitos de asistencia lingüística. Llame al [phone].    We comply with applicable federal civil rights laws and [place] laws. We do not discriminate on the basis of race, color, national origin, age, disability, sex, sexual orientation, or gender identity.            Thank you!     Thank " you for choosing [facility]  for your care. Our goal is always to provide you with excellent care. Hearing back from our patients is one way we can continue to improve our services. Please take a few minutes to complete the written survey that you may receive in the mail after your visit with us. Thank you!             Your Updated Medication List - Protect others around you: Learn how to safely use, store and throw away your medicines at www.disposemymeds.org.          This list is accurate as of 6/20/18  1:45 PM.  Always use your most recent med list.                   Brand Name Dispense Instructions for use Diagnosis    acyclovir 400 MG tablet    ZOVIRAX    60 tablet    Take 1 tablet (400 mg) by mouth 2 times daily Start 1 week prior to daratumumab and continue for 3 months after treatment is complete.    Multiple myeloma not having achieved remission (H)       CALCIUM CITRATE + PO      Take 2,000 mg by mouth daily 2 tabs        carboxymethylcellulose 0.5 % Soln ophthalmic solution    REFRESH PLUS     1 drop 4 times daily        CLARINEX PO      Take by mouth daily Taking claritin        COMPAZINE PO      Take 10 mg by mouth daily as needed        cycloSPORINE 0.05 % ophthalmic emulsion    RESTASIS     Place 1 drop into both eyes every 12 hours        DAILY MULTIVITAMIN PO      Take 1 tablet by mouth daily.    Routine general medical examination at a health care facility       * dexamethasone 4 MG tablet    DECADRON    28 tablet    Take 20mg (5 tablets) by mouth every week on the morning of velcade injection.    Multiple myeloma not having achieved remission (H)       * dexamethasone 4 MG tablet    DECADRON    28 tablet    Take 20mg (5 tablets) by mouth every week on the morning of velcade injection.    Multiple myeloma not having achieved remission (H)       * dexamethasone 4 MG tablet    DECADRON    28 tablet    Take 20mg (5 tablets) by mouth every week on the morning of  velcade injection.    Multiple myeloma not having achieved remission (H)       erythromycin ophthalmic ointment    ROMYCIN     Place 1 Application into both eyes At Bedtime        lidocaine-prilocaine cream    EMLA    30 g    Apply topically as needed for moderate pain Apply dollop size amount to port site 30-60 min prior to accessing    Multiple myeloma not having achieved remission (H)       LORazepam 0.5 MG tablet    ATIVAN    30 tablet    Take 1 tablet (0.5 mg) by mouth every 8 hours as needed for anxiety    Multiple myeloma not having achieved remission (H)       metoprolol succinate 50 MG 24 hr tablet    TOPROL-XL    270 tablet    TAKE 2 TABLETS BY MOUTH EVERY MORNING, AND 1 TABLET EVERY EVENING    Paroxysmal atrial fibrillation (H)       oxyCODONE IR 15 MG tablet    ROXICODONE    60 tablet    Take 1 tablet (15 mg) by mouth every 8 hours as needed for pain maximum 4 tablet(s) per day    Multiple myeloma not having achieved remission (H)       polyethylene glycol powder    MIRALAX/GLYCOLAX     Take 1 capful by mouth daily as needed    Bilateral leg edema       SIMBRINZA 1-0.2 % ophthalmic suspension   Generic drug:  brinzolamide-brimonidine      Place 1 drop into the right eye 2 times daily 1 drop AM and PM        triamcinolone 0.5 % cream    KENALOG    15 g    Apply sparingly to affected area three times daily. 1-2 weeks , as needed    Rash and nonspecific skin eruption       TYLENOL PO      Take 500 mg by mouth every 6 hours as needed for mild pain or fever        UNABLE TO FIND      MEDICATION NAME: Fresh Coat eye drops        VITAMIN D3 PO      Take 1,000 Units by mouth daily        warfarin 4 MG tablet    COUMADIN    110 tablet    TAKE 1-2 TABLETS BY MOUTH EVERY DAY AS DIRECTED BY INR CLINIC    Paroxysmal atrial fibrillation (H), Long-term (current) use of anticoagulants       ZOMETA IV      Inject into the vein every 30 days Every 3 month dosing        * Notice:  This list has 3 medication(s) that are the  same as other medications prescribed for you. Read the directions carefully, and ask your doctor or other care provider to review them with you.

## 2018-06-20 NOTE — PROGRESS NOTES
Infusion Nursing Note:  Amira Arreola presents today for C14D15 velcade    Patient seen by provider today: No   present during visit today: Not Applicable.    Note: N/A.    Intravenous Access:  Labs drawn without difficulty.  Implanted Port.    Treatment Conditions:  Lab Results   Component Value Date    HGB 11.9 06/20/2018     Lab Results   Component Value Date    WBC 4.1 06/20/2018      Lab Results   Component Value Date    ANEU 3.8 06/20/2018     Lab Results   Component Value Date     06/20/2018      Results reviewed, labs MET treatment parameters, ok to proceed with treatment.      Post Infusion Assessment:  Patient tolerated injection without incident.  Site patent and intact, free from redness, edema or discomfort.  No evidence of extravasations.  Access discontinued per protocol.    Discharge Plan:   [facility]  Patient will return 6/27 for next appointment.   Patient discharged in stable condition accompanied by: self.  Departure Mode: Ambulatory.    Jamari Gamboa RN

## 2018-06-22 ENCOUNTER — OFFICE VISIT (OUTPATIENT)
Dept: FAMILY MEDICINE | Facility: CLINIC | Age: 83
End: 2018-06-22
Payer: COMMERCIAL

## 2018-06-22 ENCOUNTER — RADIANT APPOINTMENT (OUTPATIENT)
Dept: GENERAL RADIOLOGY | Facility: CLINIC | Age: 83
End: 2018-06-22
Attending: NURSE PRACTITIONER
Payer: COMMERCIAL

## 2018-06-22 VITALS
TEMPERATURE: 97.5 F | BODY MASS INDEX: 23.14 KG/M2 | SYSTOLIC BLOOD PRESSURE: 142 MMHG | HEART RATE: 94 BPM | DIASTOLIC BLOOD PRESSURE: 91 MMHG | HEIGHT: 66 IN | OXYGEN SATURATION: 97 % | WEIGHT: 144 LBS

## 2018-06-22 DIAGNOSIS — R05.9 COUGH: Primary | ICD-10-CM

## 2018-06-22 DIAGNOSIS — C90.00 MULTIPLE MYELOMA NOT HAVING ACHIEVED REMISSION (H): ICD-10-CM

## 2018-06-22 DIAGNOSIS — H61.23 BILATERAL IMPACTED CERUMEN: ICD-10-CM

## 2018-06-22 DIAGNOSIS — R05.9 COUGH: ICD-10-CM

## 2018-06-22 PROCEDURE — 99213 OFFICE O/P EST LOW 20 MIN: CPT | Performed by: NURSE PRACTITIONER

## 2018-06-22 PROCEDURE — 71046 X-RAY EXAM CHEST 2 VIEWS: CPT

## 2018-06-22 NOTE — NURSING NOTE
Bilateral Ear wash attempted.  Unable to clear wax.  Pt will used ear wax softening drops and return for NURSE ONLY visit if need.

## 2018-06-22 NOTE — MR AVS SNAPSHOT
After Visit Summary   6/22/2018    Amira Arreola    MRN: [id number]           Patient Information     Date Of Birth          7/17/1932        Visit Information        Provider Department      6/22/2018 10:00 AM Erick Luna APRN CNP [facility] [place]        Today's Diagnoses     Cough    -  1    Bilateral impacted cerumen          Care Instructions    You can try Mucinex DM to help with the cough               Follow-ups after your visit        Your next 10 appointments already scheduled     Jun 27, 2018  9:30 AM CDT   Level 2 with SH INFUSION CHAIR 20   [facility] ([facility])    [facility]  [address]  [place] [address]   [phone]            Jul 03, 2018  9:30 AM CDT   Level 5 with SH INFUSION CHAIR 15   [facility] ([facility])    [facility]  [address]  [place] [address]   [phone]            Jul 11, 2018  9:30 AM CDT   Level 2 with SH INFUSION CHAIR 20   [facility] ([facility])    [facility]  [address]  [place] [address]   [phone]            Jul 18, 2018  9:30 AM CDT   Level 2 with SH INFUSION CHAIR 15   [facility] ([facility])    [facility]  [address]  [place] [address]   [phone]            Jul 18, 2018 10:40 AM CDT   Return Visit with Shayne Roberts MD   [facility] ([facility])    [facility]  [address]  [place] [address]   [phone]              Who to contact     If you have questions or need follow up information about today's clinic visit or your schedule please contact [facility] directly at [phone].  Normal or non-critical lab  "and imaging results will be communicated to you by MyChart, letter or phone within 4 business days after the clinic has received the results. If you do not hear from us within 7 days, please contact the clinic through MyChart or phone. If you have a critical or abnormal lab result, we will notify you by phone as soon as possible.  Submit refill requests through [employer] or call your pharmacy and they will forward the refill request to us. Please allow 3 business days for your refill to be completed.          Additional Information About Your Visit        Care EveryWhere ID     This is your Care EveryWhere ID. This could be used by other organizations to access your [place] medical records  [id number]        Your Vitals Were     Pulse Temperature Height Pulse Oximetry Breastfeeding? BMI (Body Mass Index)    94 97.5  F (36.4  C) (Oral) 5' 5.5\" (1.664 m) 97% No 23.6 kg/m2       Blood Pressure from Last 3 Encounters:   06/22/18 (!) 142/91   06/20/18 139/81   06/13/18 (!) 151/92    Weight from Last 3 Encounters:   06/22/18 144 lb (65.3 kg)   06/20/18 144 lb 3.2 oz (65.4 kg)   06/13/18 146 lb 9.6 oz (66.5 kg)               Primary Care Provider Office Phone # Fax #    Addy Frias [phone] [address] [facility] [address]  [facility] [address]        Equal Access to Services     [facility] AH: Hadii aad ku hadasho Soyair, waaxda luqadaha, qaybta kaalmada alonso, hung dominique . So [facility] [phone].    ATENCIÓN: Si habla español, tiene a barlow disposición servicios gratuitos de asistencia lingüística. Kadei al [phone].    We comply with applicable federal civil rights laws and [place] laws. We do not discriminate on the basis of race, color, national origin, age, disability, sex, sexual orientation, or gender identity.            Thank you!     Thank you for choosing [facility]  for your care. Our goal is always to provide you with excellent care. " Hearing back from our patients is one way we can continue to improve our services. Please take a few minutes to complete the written survey that you may receive in the mail after your visit with us. Thank you!             Your Updated Medication List - Protect others around you: Learn how to safely use, store and throw away your medicines at www.disposemymeds.org.          This list is accurate as of 6/22/18 11:01 AM.  Always use your most recent med list.                   Brand Name Dispense Instructions for use Diagnosis    acyclovir 400 MG tablet    ZOVIRAX    60 tablet    Take 1 tablet (400 mg) by mouth 2 times daily Start 1 week prior to daratumumab and continue for 3 months after treatment is complete.    Multiple myeloma not having achieved remission (H)       CALCIUM CITRATE + PO      Take 2,000 mg by mouth daily 2 tabs        carboxymethylcellulose 0.5 % Soln ophthalmic solution    REFRESH PLUS     1 drop 4 times daily        CLARINEX PO      Take by mouth daily Taking claritin        COMPAZINE PO      Take 10 mg by mouth daily as needed        cycloSPORINE 0.05 % ophthalmic emulsion    RESTASIS     Place 1 drop into both eyes every 12 hours        DAILY MULTIVITAMIN PO      Take 1 tablet by mouth daily.    Routine general medical examination at a health care facility       * dexamethasone 4 MG tablet    DECADRON    28 tablet    Take 20mg (5 tablets) by mouth every week on the morning of velcade injection.    Multiple myeloma not having achieved remission (H)       * dexamethasone 4 MG tablet    DECADRON    28 tablet    Take 20mg (5 tablets) by mouth every week on the morning of velcade injection.    Multiple myeloma not having achieved remission (H)       * dexamethasone 4 MG tablet    DECADRON    28 tablet    Take 20mg (5 tablets) by mouth every week on the morning of velcade injection.    Multiple myeloma not having achieved remission (H)       erythromycin ophthalmic ointment    ROMYCIN     Place 1  Application into both eyes At Bedtime        lidocaine-prilocaine cream    EMLA    30 g    Apply topically as needed for moderate pain Apply dollop size amount to port site 30-60 min prior to accessing    Multiple myeloma not having achieved remission (H)       LORazepam 0.5 MG tablet    ATIVAN    30 tablet    Take 1 tablet (0.5 mg) by mouth every 8 hours as needed for anxiety    Multiple myeloma not having achieved remission (H)       metoprolol succinate 50 MG 24 hr tablet    TOPROL-XL    270 tablet    TAKE 2 TABLETS BY MOUTH EVERY MORNING, AND 1 TABLET EVERY EVENING    Paroxysmal atrial fibrillation (H)       oxyCODONE IR 15 MG tablet    ROXICODONE    60 tablet    Take 1 tablet (15 mg) by mouth every 8 hours as needed for pain maximum 4 tablet(s) per day    Multiple myeloma not having achieved remission (H)       polyethylene glycol powder    MIRALAX/GLYCOLAX     Take 1 capful by mouth daily as needed    Bilateral leg edema       REFRESH OP           SIMBRINZA 1-0.2 % ophthalmic suspension   Generic drug:  brinzolamide-brimonidine      Place 1 drop into the right eye 2 times daily 1 drop AM and PM        triamcinolone 0.5 % cream    KENALOG    15 g    Apply sparingly to affected area three times daily. 1-2 weeks , as needed    Rash and nonspecific skin eruption       TYLENOL PO      Take 500 mg by mouth every 6 hours as needed for mild pain or fever        UNABLE TO FIND      MEDICATION NAME: Fresh Coat eye drops        VITAMIN D3 PO      Take 1,000 Units by mouth daily        warfarin 4 MG tablet    COUMADIN    110 tablet    TAKE 1-2 TABLETS BY MOUTH EVERY DAY AS DIRECTED BY INR CLINIC    Paroxysmal atrial fibrillation (H), Long-term (current) use of anticoagulants       ZOMETA IV      Inject into the vein every 30 days Every 3 month dosing        * Notice:  This list has 3 medication(s) that are the same as other medications prescribed for you. Read the directions carefully, and ask your doctor or other care  provider to review them with you.

## 2018-06-22 NOTE — PROGRESS NOTES
"HPI      SUBJECTIVE:   Amira Arreola is a 85 year old female who presents to clinic today for the following health issues:    Chief Complaint   Patient presents with     Cough     productive intermittant cough 1.5 weeks.  Sinus drainage.  Swallows phlegm.  No ear problems, no HA, no sorethroat.  Has not tried any OTC's        Cough for a week and a half   Cough is productive; swallows secretions so unsure of color   Feeling tired     A little dizzy when she stands up   Weekly treatment for multiple myeloma which she has on Wednesdays  No fevers, body aches, chest pain, SOB  No sore throat, headaches  No chills per se but is cold d/t MM   No sick contacts but she is \"out and about\"   Feels overall the cough is \"a tad\" better since it started      Past Medical History:   Diagnosis Date     Ascending aorta dilatation (H) 4/16    on echo, mild     Cancer, metastatic to bone (H)     due to myeloma     Colonic polyp 2008    adenomatous, fu 2013 tics only     Compression fracture 2016    multiple areas of spine     Dry eyes      Elevated MCV 2015    b12 and folic acid nl     HTN (hypertension) 2000    off meds for years     Menorrhagia 2002    hysteroscopy and d and c done     MGUS (monoclonal gammopathy of unknown significance) 2015    eval by Dr. Roberts     Multiple myeloma (H) 2016    dx 5/16 at [facility], bone lesions seen on mri 6/16     OAB (overactive bladder) 2013    Dr. Grullon     Osteoporosis     fu done 2010 and stable, went off meds then, fu done 2013; has had gyn fu and added evista 2013 by gyn     Palpitations 4/16    nl echo, mildly dilated asc aorta     Paroxysmal atrial fibrillation (H) 4/16    had palp and ziopatch showed it, echo nl lv fxn, mild mr and tr, added coum and toprol, toprol dose raised 12/22/16     Sciatica of left side 12/13    Dr. Helen Ricardo 2004     SVT (supraventricular tachycardia) (H) 4/16    on ziopatch     Thrombocytopenia (H) 2014     Family History   Problem Relation Age " of Onset     HEART DISEASE Father      C.A.D. Mother      Cerebrovascular Disease Brother      Family History Negative Sister      Family History Negative Sister      Family History Negative Brother      Past Surgical History:   Procedure Laterality Date     BONE MARROW BIOPSY, BONE SPECIMEN, NEEDLE/TROCAR N/A 2016    Procedure: BIOPSY BONE MARROW;  Surgeon: Bryan Patel MD;  Location:  GI     CATARACT IOL, RT/LT        SECTION  1965, 1966     COLONOSCOPY  2013    Procedure: COLONOSCOPY;  COLONOSCOPY;  Surgeon: Steffany Rockwell MD;  Location:  GI     EXCISE EXOSTOSIS TIBIA / FIBULA  2014    Procedure: EXCISE EXOSTOSIS TIBIA / FIBULA;  Surgeon: Naila Pichardo MD;  Location:  [facility]     hysteroscopy and d and c      due to bleeding     left anle replacement       right ankle surgery       Social History   Substance Use Topics     Smoking status: Never Smoker     Smokeless tobacco: Never Used     Alcohol use No     Current Outpatient Prescriptions   Medication Sig Dispense Refill     Acetaminophen (TYLENOL PO) Take 500 mg by mouth every 6 hours as needed for mild pain or fever       Calcium Citrate-Vitamin D (CALCIUM CITRATE + PO) Take 2,000 mg by mouth daily 2 tabs       carboxymethylcellulose (REFRESH PLUS) 0.5 % SOLN 1 drop 4 times daily       Cholecalciferol (VITAMIN D3 PO) Take 1,000 Units by mouth daily       cycloSPORINE (RESTASIS) 0.05 % ophthalmic emulsion Place 1 drop into both eyes every 12 hours        Desloratadine (CLARINEX PO) Take by mouth daily Taking claritin       dexamethasone (DECADRON) 4 MG tablet Take 20mg (5 tablets) by mouth every week on the morning of velcade injection. 28 tablet 0     dexamethasone (DECADRON) 4 MG tablet Take 20mg (5 tablets) by mouth every week on the morning of velcade injection. 28 tablet 0     dexamethasone (DECADRON) 4 MG tablet Take 20mg (5 tablets) by mouth every week on the morning of velcade injection.  "28 tablet 0     erythromycin (ROMYCIN) ophthalmic ointment Place 1 Application into both eyes At Bedtime        lidocaine-prilocaine (EMLA) cream Apply topically as needed for moderate pain Apply dollop size amount to port site 30-60 min prior to accessing 30 g 1     LORazepam (ATIVAN) 0.5 MG tablet Take 1 tablet (0.5 mg) by mouth every 8 hours as needed for anxiety 30 tablet 3     metoprolol succinate (TOPROL-XL) 50 MG 24 hr tablet TAKE 2 TABLETS BY MOUTH EVERY MORNING, AND 1 TABLET EVERY EVENING 270 tablet 1     Multiple Vitamin (DAILY MULTIVITAMIN PO) Take 1 tablet by mouth daily.       oxyCODONE IR (ROXICODONE) 15 MG tablet Take 1 tablet (15 mg) by mouth every 8 hours as needed for pain maximum 4 tablet(s) per day 60 tablet 0     polyethylene glycol (MIRALAX/GLYCOLAX) powder Take 1 capful by mouth daily as needed        Polyvinyl Alcohol-Povidone (REFRESH OP)        Prochlorperazine Maleate (COMPAZINE PO) Take 10 mg by mouth daily as needed        SIMBRINZA 1-0.2 % ophthalmic suspension Place 1 drop into the right eye 2 times daily 1 drop AM and PM  2     triamcinolone (KENALOG) 0.5 % cream Apply sparingly to affected area three times daily. 1-2 weeks , as needed 15 g 1     UNABLE TO FIND MEDICATION NAME: Fresh Coat eye drops       warfarin (COUMADIN) 4 MG tablet TAKE 1-2 TABLETS BY MOUTH EVERY DAY AS DIRECTED BY INR CLINIC 110 tablet 0     Zoledronic Acid (ZOMETA IV) Inject into the vein every 30 days Every 3 month dosing       acyclovir (ZOVIRAX) 400 MG tablet Take 1 tablet (400 mg) by mouth 2 times daily Start 1 week prior to daratumumab and continue for 3 months after treatment is complete. 60 tablet 11     Allergies   Allergen Reactions     Penicillin [Penicillins] Rash     Blotches on chest        Reviewed and updated as needed this visit by clinical staff and provider       ROS  Detailed as above       BP (!) 142/91 (Cuff Size: Adult Regular)  Pulse 94  Temp 97.5  F (36.4  C) (Oral)  Ht 5' 5.5\" (1.664 " m)  Wt 144 lb (65.3 kg)  SpO2 97%  Breastfeeding? No  BMI 23.6 kg/m2      Physical Exam   Constitutional: She is well-developed, well-nourished, and in no distress.   HENT:   Head: Normocephalic.   Right Ear: External ear and ear canal normal.   Left Ear: External ear and ear canal normal.   Mouth/Throat: Oropharynx is clear and moist. No oropharyngeal exudate.   Bilateral cerumen impaction   Eyes: Conjunctivae are normal.   Neck: Normal range of motion.   Cardiovascular: Normal rate and normal heart sounds.  An irregular rhythm present.   No murmur heard.  Hx of A fib   Pulmonary/Chest: Effort normal. No respiratory distress.   Fine crackles bilateral bases  Coughing   Lymphadenopathy:     She has no cervical adenopathy.   Neurological: She is alert.   Skin: Skin is warm and dry.   Psychiatric: Mood and affect normal.   Vitals reviewed.      Assessment and Plan:       ICD-10-CM    1. Cough R05 XR Chest 2 Views   2. Multiple myeloma not having achieved remission (H) C90.00    3. Bilateral impacted cerumen H61.23 HC REMOVAL IMPACTED CERUMEN IRRIGATION/LVG UNILAT     Likely viral etiology of cough. No obvious acute findings on CXR, reviewed by me. CXR completed d/t malignancy. Cough is somewhat improved since it started. Supportive cares as needed such as Mucinex DM. Call or return to clinic if symptoms persist, feeling worse, or new fevers.     CMA completed cerumen removal by lavage     Seen in conjunction with Zora Green, [employer] student.     Erick Luna, APRN, CNP  [facility]

## 2018-06-26 ENCOUNTER — TELEPHONE (OUTPATIENT)
Dept: FAMILY MEDICINE | Facility: CLINIC | Age: 83
End: 2018-06-26

## 2018-06-26 DIAGNOSIS — C79.51 CANCER, METASTATIC TO BONE (H): ICD-10-CM

## 2018-06-26 DIAGNOSIS — C90.00 MULTIPLE MYELOMA NOT HAVING ACHIEVED REMISSION (H): Primary | ICD-10-CM

## 2018-06-26 NOTE — LETTER
June 26, 2018      Amira Arreola  [address]  [facility] [address]        Dear Amira,    An important part of the care we provide for all of our patients is to talk about your illness, your goals and wishes, and do some planning for the future.    I would like to set up an appointment with you to have this conversation.  Please read the enclosed information for more details.  Someone from my team will contact you soon to answer any questions and schedule your appointment.    Sincerely,     GERSON JAMESON   [place] [address] [address] [facility] [address]  [phone]  Dept: [phone]

## 2018-06-27 ENCOUNTER — INFUSION THERAPY VISIT (OUTPATIENT)
Dept: INFUSION THERAPY | Facility: CLINIC | Age: 83
End: 2018-06-27
Attending: INTERNAL MEDICINE
Payer: MEDICARE

## 2018-06-27 ENCOUNTER — HOSPITAL ENCOUNTER (OUTPATIENT)
Facility: CLINIC | Age: 83
Setting detail: SPECIMEN
Discharge: HOME | End: 2018-06-27
Attending: INTERNAL MEDICINE | Admitting: INTERNAL MEDICINE
Payer: MEDICARE

## 2018-06-27 ENCOUNTER — ANTICOAGULATION THERAPY VISIT (OUTPATIENT)
Dept: FAMILY MEDICINE | Facility: CLINIC | Age: 83
End: 2018-06-27
Payer: COMMERCIAL

## 2018-06-27 VITALS
SYSTOLIC BLOOD PRESSURE: 135 MMHG | BODY MASS INDEX: 24.06 KG/M2 | OXYGEN SATURATION: 98 % | HEART RATE: 84 BPM | RESPIRATION RATE: 20 BRPM | TEMPERATURE: 97.8 F | DIASTOLIC BLOOD PRESSURE: 79 MMHG | WEIGHT: 146.8 LBS

## 2018-06-27 DIAGNOSIS — C90.00 MULTIPLE MYELOMA NOT HAVING ACHIEVED REMISSION (H): Primary | ICD-10-CM

## 2018-06-27 DIAGNOSIS — Z79.01 LONG-TERM (CURRENT) USE OF ANTICOAGULANTS: ICD-10-CM

## 2018-06-27 DIAGNOSIS — I48.0 PAROXYSMAL ATRIAL FIBRILLATION (H): ICD-10-CM

## 2018-06-27 LAB
BASOPHILS # BLD AUTO: 0 10E9/L (ref 0–0.2)
BASOPHILS NFR BLD AUTO: 0 %
DIFFERENTIAL METHOD BLD: ABNORMAL
EOSINOPHIL # BLD AUTO: 0 10E9/L (ref 0–0.7)
EOSINOPHIL NFR BLD AUTO: 0.2 %
ERYTHROCYTE [DISTWIDTH] IN BLOOD BY AUTOMATED COUNT: 14.1 % (ref 10–15)
HCT VFR BLD AUTO: 35.4 % (ref 35–47)
HGB BLD-MCNC: 11.8 G/DL (ref 11.7–15.7)
IMM GRANULOCYTES # BLD: 0 10E9/L (ref 0–0.4)
IMM GRANULOCYTES NFR BLD: 0.5 %
INR PPP: 3.13 (ref 0.86–1.14)
LYMPHOCYTES # BLD AUTO: 0.3 10E9/L (ref 0.8–5.3)
LYMPHOCYTES NFR BLD AUTO: 4.8 %
MCH RBC QN AUTO: 32.8 PG (ref 26.5–33)
MCHC RBC AUTO-ENTMCNC: 33.3 G/DL (ref 31.5–36.5)
MCV RBC AUTO: 98 FL (ref 78–100)
MONOCYTES # BLD AUTO: 0.2 10E9/L (ref 0–1.3)
MONOCYTES NFR BLD AUTO: 3.5 %
NEUTROPHILS # BLD AUTO: 5.9 10E9/L (ref 1.6–8.3)
NEUTROPHILS NFR BLD AUTO: 91 %
NRBC # BLD AUTO: 0 10*3/UL
NRBC BLD AUTO-RTO: 0 /100
PLATELET # BLD AUTO: 144 10E9/L (ref 150–450)
RBC # BLD AUTO: 3.6 10E12/L (ref 3.8–5.2)
WBC # BLD AUTO: 6.5 10E9/L (ref 4–11)

## 2018-06-27 PROCEDURE — 25000128 H RX IP 250 OP 636: Performed by: INTERNAL MEDICINE

## 2018-06-27 PROCEDURE — 96401 CHEMO ANTI-NEOPL SQ/IM: CPT

## 2018-06-27 PROCEDURE — 85610 PROTHROMBIN TIME: CPT | Performed by: INTERNAL MEDICINE

## 2018-06-27 PROCEDURE — 85025 COMPLETE CBC W/AUTO DIFF WBC: CPT | Performed by: INTERNAL MEDICINE

## 2018-06-27 PROCEDURE — 99207 ZZC NO CHARGE NURSE ONLY: CPT | Performed by: INTERNAL MEDICINE

## 2018-06-27 RX ORDER — HEPARIN SODIUM (PORCINE) LOCK FLUSH IV SOLN 100 UNIT/ML 100 UNIT/ML
5 SOLUTION INTRAVENOUS EVERY 8 HOURS
Status: DISCONTINUED | OUTPATIENT
Start: 2018-06-27 | End: 2018-06-27 | Stop reason: HOSPADM

## 2018-06-27 RX ADMIN — BORTEZOMIB 2.3 MG: 3.5 INJECTION, POWDER, LYOPHILIZED, FOR SOLUTION INTRAVENOUS; SUBCUTANEOUS at 10:51

## 2018-06-27 RX ADMIN — SODIUM CHLORIDE, PRESERVATIVE FREE 5 ML: 5 INJECTION INTRAVENOUS at 09:30

## 2018-06-27 ASSESSMENT — PAIN SCALES - GENERAL: PAINLEVEL: NO PAIN (0)

## 2018-06-27 NOTE — MR AVS SNAPSHOT
After Visit Summary   6/27/2018    Amira Arreola    MRN: [id number]           Patient Information     Date Of Birth          7/17/1932        Visit Information        Provider Department      6/27/2018 9:30 AM  INFUSION CHAIR 20 [facility]        Today's Diagnoses     Multiple myeloma not having achieved remission (H)    -  1    Paroxysmal atrial fibrillation (H)           Follow-ups after your visit        Your next 10 appointments already scheduled     Jun 28, 2018  1:15 PM CDT   Office Visit with [facility] Fri[facility] MD   [facility]    [address] [facility] [address]   [phone]           Bring a current list of meds and any records pertaining to this visit. For Physicals, please bring immunization records and any forms needing to be filled out. Please arrive 10 minutes early to complete paperwork.            Jul 03, 2018  9:30 AM CDT   Level 5 with  INFUSION CHAIR 15   [facility] ([facility])    [facility]  [address]  [place] [address]   [phone]            Jul 11, 2018  9:30 AM CDT   Level 2 with  INFUSION CHAIR 20   [facility] ([facility])    [facility]  [address]  [place] [address]   [phone]            Jul 18, 2018  9:30 AM CDT   Level 2 with  INFUSION CHAIR 15   [facility] ([facility])    [facility]  [address]  [place] [address]   [phone]            Jul 18, 2018 10:40 AM CDT   Return Visit with Shayne Roberts MD   [facility])    [facility]  [address]  [place] [address]   [phone]              Who to contact     If you have questions or need  follow up information about today's clinic visit or your schedule please contact [facility] directly at [phone].  Normal or non-critical lab and imaging results will be communicated to you by MyChart, letter or phone within 4 business days after the clinic has received the results. If you do not hear from us within 7 days, please contact the clinic through MyChart or phone. If you have a critical or abnormal lab result, we will notify you by phone as soon as possible.  Submit refill requests through S[employer] or call your pharmacy and they will forward the refill request to us. Please allow 3 business days for your refill to be completed.          Additional Information About Your Visit        Care EveryWhere ID     This is your Care EveryWhere ID. This could be used by other organizations to access your [facility] medical records  [id number]        Your Vitals Were     Pulse Temperature Respirations Pulse Oximetry BMI (Body Mass Index)       84 97.8  F (36.6  C) (Oral) 20 98% 24.06 kg/m2        Blood Pressure from Last 3 Encounters:   06/27/18 135/79   06/22/18 (!) 142/91   06/20/18 139/81    Weight from Last 3 Encounters:   06/27/18 66.6 kg (146 lb 12.8 oz)   06/22/18 65.3 kg (144 lb)   06/20/18 65.4 kg (144 lb 3.2 oz)              We Performed the Following     CBC with platelets differential     INR        Primary Care Provider Office Phone # Fax #    [facility] Sean Frias [phone] [address]  [place] [address]        Equal Access to Services     [facility]: Hadii aad ku hadasho Soomaali, waaxda luqadaha, qaybta kaalmada alonso, hung dominique . So [facility] [phone].    ATENCIÓN: Si habla español, tiene a barlow disposición servicios gratuitos de asistencia lingüística. Llame al [phone].    We comply with applicable federal civil rights laws and [place] laws. We do not discriminate on the basis of race,  color, national origin, age, disability, sex, sexual orientation, or gender identity.            Thank you!     Thank you for choosing [facility]  for your care. Our goal is always to provide you with excellent care. Hearing back from our patients is one way we can continue to improve our services. Please take a few minutes to complete the written survey that you may receive in the mail after your visit with us. Thank you!             Your Updated Medication List - Protect others around you: Learn how to safely use, store and throw away your medicines at www.disposemymeds.org.          This list is accurate as of 6/27/18 10:54 AM.  Always use your most recent med list.                   Brand Name Dispense Instructions for use Diagnosis    acyclovir 400 MG tablet    ZOVIRAX    60 tablet    Take 1 tablet (400 mg) by mouth 2 times daily Start 1 week prior to daratumumab and continue for 3 months after treatment is complete.    Multiple myeloma not having achieved remission (H)       CALCIUM CITRATE + PO      Take 2,000 mg by mouth daily 2 tabs        carboxymethylcellulose 0.5 % Soln ophthalmic solution    REFRESH PLUS     1 drop 4 times daily        CLARINEX PO      Take by mouth daily Taking claritin        COMPAZINE PO      Take 10 mg by mouth daily as needed        cycloSPORINE 0.05 % ophthalmic emulsion    RESTASIS     Place 1 drop into both eyes every 12 hours        DAILY MULTIVITAMIN PO      Take 1 tablet by mouth daily.    Routine general medical examination at a health care facility       * dexamethasone 4 MG tablet    DECADRON    28 tablet    Take 20mg (5 tablets) by mouth every week on the morning of velcade injection.    Multiple myeloma not having achieved remission (H)       * dexamethasone 4 MG tablet    DECADRON    28 tablet    Take 20mg (5 tablets) by mouth every week on the morning of velcade injection.    Multiple myeloma not having achieved remission (H)       *  dexamethasone 4 MG tablet    DECADRON    28 tablet    Take 20mg (5 tablets) by mouth every week on the morning of velcade injection.    Multiple myeloma not having achieved remission (H)       erythromycin ophthalmic ointment    ROMYCIN     Place 1 Application into both eyes At Bedtime        lidocaine-prilocaine cream    EMLA    30 g    Apply topically as needed for moderate pain Apply dollop size amount to port site 30-60 min prior to accessing    Multiple myeloma not having achieved remission (H)       LORazepam 0.5 MG tablet    ATIVAN    30 tablet    Take 1 tablet (0.5 mg) by mouth every 8 hours as needed for anxiety    Multiple myeloma not having achieved remission (H)       metoprolol succinate 50 MG 24 hr tablet    TOPROL-XL    270 tablet    TAKE 2 TABLETS BY MOUTH EVERY MORNING, AND 1 TABLET EVERY EVENING    Paroxysmal atrial fibrillation (H)       oxyCODONE IR 15 MG tablet    ROXICODONE    60 tablet    Take 1 tablet (15 mg) by mouth every 8 hours as needed for pain maximum 4 tablet(s) per day    Multiple myeloma not having achieved remission (H)       polyethylene glycol powder    MIRALAX/GLYCOLAX     Take 1 capful by mouth daily as needed    Bilateral leg edema       REFRESH OP           SIMBRINZA 1-0.2 % ophthalmic suspension   Generic drug:  brinzolamide-brimonidine      Place 1 drop into the right eye 2 times daily 1 drop AM and PM        triamcinolone 0.5 % cream    KENALOG    15 g    Apply sparingly to affected area three times daily. 1-2 weeks , as needed    Rash and nonspecific skin eruption       TYLENOL PO      Take 500 mg by mouth every 6 hours as needed for mild pain or fever        UNABLE TO FIND      MEDICATION NAME: Fresh Coat eye drops        VITAMIN D3 PO      Take 1,000 Units by mouth daily        warfarin 4 MG tablet    COUMADIN    110 tablet    TAKE 1-2 TABLETS BY MOUTH EVERY DAY AS DIRECTED BY INR CLINIC    Paroxysmal atrial fibrillation (H), Long-term (current) use of anticoagulants        ZOMETA IV      Inject into the vein every 30 days Every 3 month dosing        * Notice:  This list has 3 medication(s) that are the same as other medications prescribed for you. Read the directions carefully, and ask your doctor or other care provider to review them with you.

## 2018-06-27 NOTE — MR AVS SNAPSHOT
Amira IVY Arreola   6/27/2018   Anticoagulation Therapy Visit    Description:  85 year old female   Provider:  [facility] Frias MD   Department:  [facility]           INR as of 6/27/2018     Today's INR 3.13!      Anticoagulation Summary as of 6/27/2018     INR goal 2.0-3.0   Today's INR 3.13!   Full warfarin instructions 4 mg every day   Next INR check 7/3/2018    Indications   Long-term (current) use of anticoagulants [Z79.01] [Z79.01]  Atrial fibrillation (H) [I48.91] (Resolved) [I48.91]         June 2018 Details    Sun Mon Tue Wed Thu Fri Sat          1               2                 3               4               5               6               7               8               9                 10               11               12               13               14               15               16                 17               18               19               20               21               22               23                 24               25               26               27      4 mg   See details      28      4 mg         29      4 mg         30      4 mg          Date Details   06/27 This INR check               How to take your warfarin dose     To take:  4 mg Take 1 of the 4 mg tablets.           July 2018 Details    Sun Mon Tue Wed Thu Fri Sat     1      4 mg         2      4 mg         3            4               5               6               7                 8               9               10               11               12               13               14                 15               16               17               18               19               20               21                 22               23               24               25               26               27               28                 29               30               31                    Date Details   No additional details    Date of next INR:  7/3/2018         How to take your warfarin dose      To take:  4 mg Take 1 of the 4 mg tablets.

## 2018-06-27 NOTE — PROGRESS NOTES
Infusion Nursing Note:  Amira Arreola presents today for Cycle 14 Day 22 Velcade.    Patient seen by provider today: No   present during visit today: Not Applicable.    Note: N/A.    Intravenous Access:  Implanted Port.    Treatment Conditions:  Lab Results   Component Value Date    HGB 11.8 06/27/2018     Lab Results   Component Value Date    WBC 6.5 06/27/2018      Lab Results   Component Value Date    ANEU 5.9 06/27/2018     Lab Results   Component Value Date     06/27/2018      Results reviewed, labs MET treatment parameters, ok to proceed with treatment.      Post Infusion Assessment:  Patient tolerated injection without incident.    Discharge Plan:   Patient declined prescription refills.  Discharge instructions reviewed with: Patient.  Patient verbalized understanding of discharge instructions and all questions answered.  Copy of AVS reviewed with patient.  Patient will return 7/3/18 for next appointment.  Patient discharged in stable condition accompanied by: self.  Departure Mode: Ambulatory.    Senia Juan RN

## 2018-06-28 ENCOUNTER — OFFICE VISIT (OUTPATIENT)
Dept: FAMILY MEDICINE | Facility: CLINIC | Age: 83
End: 2018-06-28
Payer: COMMERCIAL

## 2018-06-28 VITALS
OXYGEN SATURATION: 96 % | WEIGHT: 145 LBS | HEART RATE: 73 BPM | TEMPERATURE: 97.6 F | SYSTOLIC BLOOD PRESSURE: 138 MMHG | DIASTOLIC BLOOD PRESSURE: 93 MMHG | HEIGHT: 66 IN | BODY MASS INDEX: 23.3 KG/M2

## 2018-06-28 DIAGNOSIS — C79.51 CANCER, METASTATIC TO BONE (H): ICD-10-CM

## 2018-06-28 DIAGNOSIS — R93.89 ABNORMAL CXR: ICD-10-CM

## 2018-06-28 DIAGNOSIS — I48.0 PAROXYSMAL ATRIAL FIBRILLATION (H): ICD-10-CM

## 2018-06-28 DIAGNOSIS — R05.9 COUGH: Primary | ICD-10-CM

## 2018-06-28 DIAGNOSIS — I77.810 ASCENDING AORTA DILATATION (H): ICD-10-CM

## 2018-06-28 DIAGNOSIS — D69.6 THROMBOCYTOPENIA (H): ICD-10-CM

## 2018-06-28 DIAGNOSIS — C90.00 MULTIPLE MYELOMA NOT HAVING ACHIEVED REMISSION (H): ICD-10-CM

## 2018-06-28 DIAGNOSIS — R60.0 BILATERAL EDEMA OF LOWER EXTREMITY: ICD-10-CM

## 2018-06-28 PROCEDURE — 99214 OFFICE O/P EST MOD 30 MIN: CPT | Performed by: INTERNAL MEDICINE

## 2018-06-28 NOTE — PATIENT INSTRUCTIONS
Let's do a repeat chest xray in 4 to 6 weeks.  Be sure to come back for that.    If you notice shortness of breath or the cough is not resolving soon let me know.    Please get the echocardiogram    [facility]y Frias M.D.

## 2018-06-28 NOTE — MR AVS SNAPSHOT
After Visit Summary   6/28/2018    Amira Arreola    MRN: [id number]           Patient Information     Date Of Birth          7/17/1932        Visit Information        Provider Department      6/28/2018 1:15 PM Addy Frias MD [facility]        Today's Diagnoses     Cough    -  1    Abnormal CXR        Cancer, metastatic to bone (H)        Thrombocytopenia (H)        Ascending aorta dilatation (H)        Paroxysmal atrial fibrillation (H)        Multiple myeloma not having achieved remission (H)          Care Instructions    Let's do a repeat chest xray in 4 to 6 weeks.  Be sure to come back for that.    If you notice shortness of breath or the cough is not resolving soon let me know.    Please get the echocardiogram    Addy Frias M.D.            Follow-ups after your visit        Your next 10 appointments already scheduled     Jul 03, 2018  9:30 AM CDT   Level 5 with  INFUSION CHAIR 15   [facility] ([facility])    [facility]  [address]  [place] [address]   [phone]            Jul 11, 2018  9:30 AM CDT   Level 2 with  INFUSION CHAIR 20   [facility] ([facility])    [facility] [place]  [address]  [place] [address]   [phone]            Jul 18, 2018  9:30 AM CDT   Level 2 with  INFUSION CHAIR 15   [facility] ([facility])    [facility] [place]  [address]  [place] [address]   [phone]            Jul 18, 2018 10:40 AM CDT   Return Visit with Shayne Roberts MD   [facility] ([facility])    [facility] [place]  [address]  [place] [address]   [phone]              Future tests that were ordered for you today     Open Future Orders        Priority Expected Expires  "Ordered    Echocardiogram Complete Routine  6/28/2019 6/28/2018    XR Chest 2 Views Routine 7/28/2018 6/28/2019 6/28/2018            Who to contact     If you have questions or need follow up information about today's clinic visit or your schedule please contact [facility] directly at [phone].  Normal or non-critical lab and imaging results will be communicated to you by MyChart, letter or phone within 4 business days after the clinic has received the results. If you do not hear from us within 7 days, please contact the clinic through MyChart or phone. If you have a critical or abnormal lab result, we will notify you by phone as soon as possible.  Submit refill requests through [employer] or call your pharmacy and they will forward the refill request to us. Please allow 3 business days for your refill to be completed.          Additional Information About Your Visit        Care EveryWhere ID     This is your Care EveryWhere ID. This could be used by other organizations to access your [facility] medical records  [id number]        Your Vitals Were     Pulse Temperature Height Pulse Oximetry Breastfeeding? BMI (Body Mass Index)    73 97.6  F (36.4  C) (Oral) 5' 5.5\" (1.664 m) 96% No 23.76 kg/m2       Blood Pressure from Last 3 Encounters:   06/28/18 (!) 138/93   06/27/18 135/79   06/22/18 (!) 142/91    Weight from Last 3 Encounters:   06/28/18 145 lb (65.8 kg)   06/27/18 146 lb 12.8 oz (66.6 kg)   06/22/18 144 lb (65.3 kg)               Primary Care Provider Office Phone # Fax #    Addy Frias [phone] [address] [facility] [address]  [facility] [address]        Equal Access to Services     SARA ZELAYA : Gissel Galloway, rhonda gutierrez, dahlia kaalmada alonso, hung sadler. So [facility] [phone].    ATENCIÓN: Si habla español, tiene a barlow disposición servicios gratuitos de asistencia lingüística. Kadie al [phone].    We comply with " applicable federal civil rights laws and [place] laws. We do not discriminate on the basis of race, color, national origin, age, disability, sex, sexual orientation, or gender identity.            Thank you!     Thank you for choosing [facility]  for your care. Our goal is always to provide you with excellent care. Hearing back from our patients is one way we can continue to improve our services. Please take a few minutes to complete the written survey that you may receive in the mail after your visit with us. Thank you!             Your Updated Medication List - Protect others around you: Learn how to safely use, store and throw away your medicines at www.disposemymeds.org.          This list is accurate as of 6/28/18  1:17 PM.  Always use your most recent med list.                   Brand Name Dispense Instructions for use Diagnosis    acyclovir 400 MG tablet    ZOVIRAX    60 tablet    Take 1 tablet (400 mg) by mouth 2 times daily Start 1 week prior to daratumumab and continue for 3 months after treatment is complete.    Multiple myeloma not having achieved remission (H)       CALCIUM CITRATE + PO      Take 2,000 mg by mouth daily 2 tabs        carboxymethylcellulose 0.5 % Soln ophthalmic solution    REFRESH PLUS     1 drop 4 times daily        CLARINEX PO      Take by mouth daily Taking claritin        COMPAZINE PO      Take 10 mg by mouth daily as needed        cycloSPORINE 0.05 % ophthalmic emulsion    RESTASIS     Place 1 drop into both eyes every 12 hours        DAILY MULTIVITAMIN PO      Take 1 tablet by mouth daily.    Routine general medical examination at a health care facility       * dexamethasone 4 MG tablet    DECADRON    28 tablet    Take 20mg (5 tablets) by mouth every week on the morning of velcade injection.    Multiple myeloma not having achieved remission (H)       * dexamethasone 4 MG tablet    DECADRON    28 tablet    Take 20mg (5 tablets) by mouth every week on the morning of  velcade injection.    Multiple myeloma not having achieved remission (H)       * dexamethasone 4 MG tablet    DECADRON    28 tablet    Take 20mg (5 tablets) by mouth every week on the morning of velcade injection.    Multiple myeloma not having achieved remission (H)       erythromycin ophthalmic ointment    ROMYCIN     Place 1 Application into both eyes At Bedtime        lidocaine-prilocaine cream    EMLA    30 g    Apply topically as needed for moderate pain Apply dollop size amount to port site 30-60 min prior to accessing    Multiple myeloma not having achieved remission (H)       LORazepam 0.5 MG tablet    ATIVAN    30 tablet    Take 1 tablet (0.5 mg) by mouth every 8 hours as needed for anxiety    Multiple myeloma not having achieved remission (H)       metoprolol succinate 50 MG 24 hr tablet    TOPROL-XL    270 tablet    TAKE 2 TABLETS BY MOUTH EVERY MORNING, AND 1 TABLET EVERY EVENING    Paroxysmal atrial fibrillation (H)       oxyCODONE IR 15 MG tablet    ROXICODONE    60 tablet    Take 1 tablet (15 mg) by mouth every 8 hours as needed for pain maximum 4 tablet(s) per day    Multiple myeloma not having achieved remission (H)       polyethylene glycol powder    MIRALAX/GLYCOLAX     Take 1 capful by mouth daily as needed    Bilateral leg edema       REFRESH OP           SIMBRINZA 1-0.2 % ophthalmic suspension   Generic drug:  brinzolamide-brimonidine      Place 1 drop into the right eye 2 times daily 1 drop AM and PM        triamcinolone 0.5 % cream    KENALOG    15 g    Apply sparingly to affected area three times daily. 1-2 weeks , as needed    Rash and nonspecific skin eruption       TYLENOL PO      Take 500 mg by mouth every 6 hours as needed for mild pain or fever        UNABLE TO FIND      MEDICATION NAME: Fresh Coat eye drops        VITAMIN D3 PO      Take 1,000 Units by mouth daily        warfarin 4 MG tablet    COUMADIN    110 tablet    TAKE 1-2 TABLETS BY MOUTH EVERY DAY AS DIRECTED BY INR CLINIC     Paroxysmal atrial fibrillation (H), Long-term (current) use of anticoagulants       ZOMETA IV      Inject into the vein every 30 days Every 3 month dosing        * Notice:  This list has 3 medication(s) that are the same as other medications prescribed for you. Read the directions carefully, and ask your doctor or other care provider to review them with you.

## 2018-06-28 NOTE — PROGRESS NOTES
The patient is here with her son for follow-up on her cough, cold, and abnormal chest x-ray found last office visit.  She also has multiple other problems as noted.    With respect to the cough and cold that is much better.  There is been a little wheezy today but not before.  The cough is less and not productive.  No chest pain or shortness of breath.  No fevers.  No earache or sore throat.  She is a non-smoker.  No history of lung disease.    The patient has multiple myeloma that is not in remission and for which she gets chemo weekly.  This is been found in the bones as well.  She also has thrombocytopenia which is monitored regularly and has been stable.    The patient has a history of atrial fibrillation for which she takes the Toprol and Coumadin.  She does have edema today bilaterally.  She is a bit vague but it sounds like that is been for 6 months.  Again, no chest pain or shortness of breath.  She has a history of an ascending aortic dilatation.  She does see cardiology.    She is very fatigued.  No fevers or night sweats.  No GI symptoms.    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 Roberts     Multiple myeloma (H) 2016    dx 5/16 at [place], bone lesions seen on mri 6/16     OAB (overactive bladder) 2013    Dr. Grullon     Osteoporosis     fu done 2010 and stable, went off meds then, fu done 2013; has had gyn fu and added evista 2013 by gyn     Palpitations 4/16    nl echo, mildly dilated asc aorta     Paroxysmal atrial fibrillation (H) 4/16    had  palp and ziopatch showed it, echo nl lv fxn, mild mr and tr, added coum and toprol, toprol dose raised 16     Sciatica of left side     Dr. Helen Ricardo      SVT (supraventricular tachycardia) (H)     on ziopatch     Thrombocytopenia (H)      Past Surgical History:   Procedure Laterality Date     BONE MARROW BIOPSY, BONE SPECIMEN, NEEDLE/TROCAR N/A 2016    Procedure: BIOPSY BONE MARROW;  Surgeon: Bryan Patel MD;  Location:  GI     CATARACT IOL, RT/LT        SECTION  ,      COLONOSCOPY  2013    Procedure: COLONOSCOPY;  COLONOSCOPY;  Surgeon: Steffany Rockwell MD;  Location:  GI     EXCISE EXOSTOSIS TIBIA / FIBULA  2014    Procedure: EXCISE EXOSTOSIS TIBIA / FIBULA;  Surgeon: Naila Pichardo MD;  Location:  [facility]     hysteroscopy and d and c      due to bleeding     left anle replacement       right ankle surgery       Social History     Social History     Marital status:      Spouse name: Tom     Number of children: 6     Years of education: N/A     Occupational History     rn anesthetist Retired     Social History Main Topics     Smoking status: Never Smoker     Smokeless tobacco: Never Used     Alcohol use No     Drug use: No     Sexual activity: No     Other Topics Concern     Not on file     Social History Narrative     Current Outpatient Prescriptions   Medication Sig Dispense Refill     Acetaminophen (TYLENOL PO) Take 500 mg by mouth every 6 hours as needed for mild pain or fever       Calcium Citrate-Vitamin D (CALCIUM CITRATE + PO) Take 2,000 mg by mouth daily 2 tabs       carboxymethylcellulose (REFRESH PLUS) 0.5 % SOLN 1 drop 4 times daily       Cholecalciferol (VITAMIN D3 PO) Take 1,000 Units by mouth daily       cycloSPORINE (RESTASIS) 0.05 % ophthalmic emulsion Place 1 drop into both eyes every 12 hours        Desloratadine (CLARINEX PO) Take by mouth daily Taking claritin       dexamethasone  (DECADRON) 4 MG tablet Take 20mg (5 tablets) by mouth every week on the morning of velcade injection. 28 tablet 0     dexamethasone (DECADRON) 4 MG tablet Take 20mg (5 tablets) by mouth every week on the morning of velcade injection. 28 tablet 0     dexamethasone (DECADRON) 4 MG tablet Take 20mg (5 tablets) by mouth every week on the morning of velcade injection. 28 tablet 0     erythromycin (ROMYCIN) ophthalmic ointment Place 1 Application into both eyes At Bedtime        lidocaine-prilocaine (EMLA) cream Apply topically as needed for moderate pain Apply dollop size amount to port site 30-60 min prior to accessing 30 g 1     LORazepam (ATIVAN) 0.5 MG tablet Take 1 tablet (0.5 mg) by mouth every 8 hours as needed for anxiety 30 tablet 3     metoprolol succinate (TOPROL-XL) 50 MG 24 hr tablet TAKE 2 TABLETS BY MOUTH EVERY MORNING, AND 1 TABLET EVERY EVENING 270 tablet 1     Multiple Vitamin (DAILY MULTIVITAMIN PO) Take 1 tablet by mouth daily.       oxyCODONE IR (ROXICODONE) 15 MG tablet Take 1 tablet (15 mg) by mouth every 8 hours as needed for pain maximum 4 tablet(s) per day 60 tablet 0     polyethylene glycol (MIRALAX/GLYCOLAX) powder Take 1 capful by mouth daily as needed        Polyvinyl Alcohol-Povidone (REFRESH OP)        Prochlorperazine Maleate (COMPAZINE PO) Take 10 mg by mouth daily as needed        SIMBRINZA 1-0.2 % ophthalmic suspension Place 1 drop into the right eye 2 times daily 1 drop AM and PM  2     triamcinolone (KENALOG) 0.5 % cream Apply sparingly to affected area three times daily. 1-2 weeks , as needed 15 g 1     UNABLE TO FIND MEDICATION NAME: Fresh Coat eye drops       warfarin (COUMADIN) 4 MG tablet TAKE 1-2 TABLETS BY MOUTH EVERY DAY AS DIRECTED BY INR CLINIC 110 tablet 0     Zoledronic Acid (ZOMETA IV) Inject into the vein every 30 days Every 3 month dosing       acyclovir (ZOVIRAX) 400 MG tablet Take 1 tablet (400 mg) by mouth 2 times daily Start 1 week prior to daratumumab and continue  "for 3 months after treatment is complete. 60 tablet 11     Allergies   Allergen Reactions     Penicillin [Penicillins] Rash     Blotches on chest      FAMILY HISTORY NOTED AND REVIEWED    REVIEW OF SYSTEMS: above    PHYSICAL EXAM    BP (!) 138/93  Pulse 73  Temp 97.6  F (36.4  C) (Oral)  Ht 5' 5.5\" (1.664 m)  Wt 145 lb (65.8 kg)  SpO2 96%  Breastfeeding? No  BMI 23.76 kg/m2    Patient appears non toxic  Mouth and eyes within normal limits  Neck within normal limits  No supraclavicular, cervical or axillary lymphadenopathy  Lungs bilat inspir rhonchi, no wheezing or crackles  cv reglar rate and rhythm, no murmer, rub or gallop, no jvp, 1+ bilat lower leg edema  Abdomen non-tender, no mgr, no hepatosplenomegaly    cxr reviewed    ASSESSMENT:  1. Cough, suspect viral, doubt chf, pneumonia, ptx, pulmonary embolis, etc  2. Abnormal cxr, ?significant, warrants follow up  3. Myeloma with bone dz, follow up onc  4. Afib, rate fine, on coum  5. bilat edema, and rhonchi, ?cmyeg on cxr, needs follow up echo.  The edema may be from chemo.  Will also check labs  6. asc aortic dil, follow up echo  7. Low platelet, follow up onc    PLAN:  Call if shortness of breath, cough not gone soon  Follow up cxr 4 to 6 weeks, son here and knows to get it done  Labs now  Echo    Addy Frias M.D.        "

## 2018-07-03 ENCOUNTER — INFUSION THERAPY VISIT (OUTPATIENT)
Dept: INFUSION THERAPY | Facility: CLINIC | Age: 83
End: 2018-07-03
Attending: INTERNAL MEDICINE
Payer: MEDICARE

## 2018-07-03 ENCOUNTER — HOSPITAL ENCOUNTER (OUTPATIENT)
Facility: CLINIC | Age: 83
Setting detail: SPECIMEN
Discharge: HOME | End: 2018-07-03
Attending: INTERNAL MEDICINE | Admitting: INTERNAL MEDICINE
Payer: MEDICARE

## 2018-07-03 ENCOUNTER — ANTICOAGULATION THERAPY VISIT (OUTPATIENT)
Dept: FAMILY MEDICINE | Facility: CLINIC | Age: 83
End: 2018-07-03
Payer: COMMERCIAL

## 2018-07-03 VITALS
HEIGHT: 66 IN | TEMPERATURE: 98.5 F | RESPIRATION RATE: 18 BRPM | HEART RATE: 75 BPM | BODY MASS INDEX: 23.3 KG/M2 | SYSTOLIC BLOOD PRESSURE: 145 MMHG | WEIGHT: 145 LBS | DIASTOLIC BLOOD PRESSURE: 70 MMHG

## 2018-07-03 DIAGNOSIS — I48.0 PAROXYSMAL ATRIAL FIBRILLATION (H): ICD-10-CM

## 2018-07-03 DIAGNOSIS — C90.00 MULTIPLE MYELOMA NOT HAVING ACHIEVED REMISSION (H): Primary | ICD-10-CM

## 2018-07-03 DIAGNOSIS — Z79.01 LONG-TERM (CURRENT) USE OF ANTICOAGULANTS: ICD-10-CM

## 2018-07-03 LAB
ALBUMIN SERPL-MCNC: 3.4 G/DL (ref 3.4–5)
ALP SERPL-CCNC: 45 U/L (ref 40–150)
ALT SERPL W P-5'-P-CCNC: 25 U/L (ref 0–50)
AST SERPL W P-5'-P-CCNC: 19 U/L (ref 0–45)
BASOPHILS # BLD AUTO: 0 10E9/L (ref 0–0.2)
BASOPHILS NFR BLD AUTO: 0.1 %
BILIRUB DIRECT SERPL-MCNC: 0.2 MG/DL (ref 0–0.2)
BILIRUB SERPL-MCNC: 0.7 MG/DL (ref 0.2–1.3)
DIFFERENTIAL METHOD BLD: ABNORMAL
EOSINOPHIL # BLD AUTO: 0 10E9/L (ref 0–0.7)
EOSINOPHIL NFR BLD AUTO: 0 %
ERYTHROCYTE [DISTWIDTH] IN BLOOD BY AUTOMATED COUNT: 14.2 % (ref 10–15)
HCT VFR BLD AUTO: 36 % (ref 35–47)
HGB BLD-MCNC: 12.1 G/DL (ref 11.7–15.7)
IMM GRANULOCYTES # BLD: 0 10E9/L (ref 0–0.4)
IMM GRANULOCYTES NFR BLD: 0.2 %
INR PPP: 3.16 (ref 0.86–1.14)
LYMPHOCYTES # BLD AUTO: 0.3 10E9/L (ref 0.8–5.3)
LYMPHOCYTES NFR BLD AUTO: 3.6 %
MCH RBC QN AUTO: 33.1 PG (ref 26.5–33)
MCHC RBC AUTO-ENTMCNC: 33.6 G/DL (ref 31.5–36.5)
MCV RBC AUTO: 98 FL (ref 78–100)
MONOCYTES # BLD AUTO: 0.2 10E9/L (ref 0–1.3)
MONOCYTES NFR BLD AUTO: 2.4 %
NEUTROPHILS # BLD AUTO: 7.5 10E9/L (ref 1.6–8.3)
NEUTROPHILS NFR BLD AUTO: 93.7 %
NRBC # BLD AUTO: 0 10*3/UL
NRBC BLD AUTO-RTO: 0 /100
PLATELET # BLD AUTO: 135 10E9/L (ref 150–450)
PROT SERPL-MCNC: 6.1 G/DL (ref 6.8–8.8)
RBC # BLD AUTO: 3.66 10E12/L (ref 3.8–5.2)
WBC # BLD AUTO: 8 10E9/L (ref 4–11)

## 2018-07-03 PROCEDURE — 96413 CHEMO IV INFUSION 1 HR: CPT

## 2018-07-03 PROCEDURE — 25000128 H RX IP 250 OP 636: Performed by: NURSE PRACTITIONER

## 2018-07-03 PROCEDURE — 96401 CHEMO ANTI-NEOPL SQ/IM: CPT

## 2018-07-03 PROCEDURE — 84165 PROTEIN E-PHORESIS SERUM: CPT | Performed by: INTERNAL MEDICINE

## 2018-07-03 PROCEDURE — A9270 NON-COVERED ITEM OR SERVICE: HCPCS | Mod: GY | Performed by: NURSE PRACTITIONER

## 2018-07-03 PROCEDURE — 96415 CHEMO IV INFUSION ADDL HR: CPT

## 2018-07-03 PROCEDURE — 96367 TX/PROPH/DG ADDL SEQ IV INF: CPT

## 2018-07-03 PROCEDURE — 80076 HEPATIC FUNCTION PANEL: CPT | Performed by: INTERNAL MEDICINE

## 2018-07-03 PROCEDURE — 83883 ASSAY NEPHELOMETRY NOT SPEC: CPT | Performed by: INTERNAL MEDICINE

## 2018-07-03 PROCEDURE — 25000132 ZZH RX MED GY IP 250 OP 250 PS 637: Mod: GY | Performed by: NURSE PRACTITIONER

## 2018-07-03 PROCEDURE — 00000402 ZZHCL STATISTIC TOTAL PROTEIN: Performed by: INTERNAL MEDICINE

## 2018-07-03 PROCEDURE — 99207 ZZC NO CHARGE NURSE ONLY: CPT | Performed by: INTERNAL MEDICINE

## 2018-07-03 PROCEDURE — 85025 COMPLETE CBC W/AUTO DIFF WBC: CPT | Performed by: INTERNAL MEDICINE

## 2018-07-03 PROCEDURE — 85610 PROTHROMBIN TIME: CPT | Performed by: INTERNAL MEDICINE

## 2018-07-03 RX ORDER — HEPARIN SODIUM (PORCINE) LOCK FLUSH IV SOLN 100 UNIT/ML 100 UNIT/ML
5 SOLUTION INTRAVENOUS EVERY 8 HOURS
Status: CANCELLED
Start: 2018-07-11

## 2018-07-03 RX ORDER — DIPHENHYDRAMINE HCL 25 MG
50 CAPSULE ORAL ONCE
Status: CANCELLED | OUTPATIENT
Start: 2018-07-03

## 2018-07-03 RX ORDER — ALBUTEROL SULFATE 0.83 MG/ML
2.5 SOLUTION RESPIRATORY (INHALATION)
Status: CANCELLED | OUTPATIENT
Start: 2018-07-11

## 2018-07-03 RX ORDER — DIPHENHYDRAMINE HYDROCHLORIDE 50 MG/ML
50 INJECTION INTRAMUSCULAR; INTRAVENOUS
Status: CANCELLED
Start: 2018-07-11

## 2018-07-03 RX ORDER — METHYLPREDNISOLONE SODIUM SUCCINATE 125 MG/2ML
125 INJECTION, POWDER, LYOPHILIZED, FOR SOLUTION INTRAMUSCULAR; INTRAVENOUS
Status: CANCELLED
Start: 2018-07-11

## 2018-07-03 RX ORDER — LORAZEPAM 2 MG/ML
0.5 INJECTION INTRAMUSCULAR EVERY 4 HOURS PRN
Status: CANCELLED
Start: 2018-07-18

## 2018-07-03 RX ORDER — MEPERIDINE HYDROCHLORIDE 25 MG/ML
25 INJECTION INTRAMUSCULAR; INTRAVENOUS; SUBCUTANEOUS EVERY 30 MIN PRN
Status: CANCELLED | OUTPATIENT
Start: 2018-07-11

## 2018-07-03 RX ORDER — EPINEPHRINE 1 MG/ML
0.3 INJECTION, SOLUTION INTRAMUSCULAR; SUBCUTANEOUS EVERY 5 MIN PRN
Status: CANCELLED | OUTPATIENT
Start: 2018-07-03

## 2018-07-03 RX ORDER — LORAZEPAM 2 MG/ML
0.5 INJECTION INTRAMUSCULAR EVERY 4 HOURS PRN
Status: CANCELLED
Start: 2018-07-11

## 2018-07-03 RX ORDER — EPINEPHRINE 0.3 MG/.3ML
0.3 INJECTION SUBCUTANEOUS EVERY 5 MIN PRN
Status: CANCELLED | OUTPATIENT
Start: 2018-07-25

## 2018-07-03 RX ORDER — SODIUM CHLORIDE 9 MG/ML
1000 INJECTION, SOLUTION INTRAVENOUS CONTINUOUS PRN
Status: CANCELLED
Start: 2018-07-03

## 2018-07-03 RX ORDER — METHYLPREDNISOLONE SODIUM SUCCINATE 125 MG/2ML
125 INJECTION, POWDER, LYOPHILIZED, FOR SOLUTION INTRAMUSCULAR; INTRAVENOUS
Status: CANCELLED
Start: 2018-07-03

## 2018-07-03 RX ORDER — MEPERIDINE HYDROCHLORIDE 25 MG/ML
25 INJECTION INTRAMUSCULAR; INTRAVENOUS; SUBCUTANEOUS EVERY 30 MIN PRN
Status: CANCELLED | OUTPATIENT
Start: 2018-07-03

## 2018-07-03 RX ORDER — MEPERIDINE HYDROCHLORIDE 25 MG/ML
25 INJECTION INTRAMUSCULAR; INTRAVENOUS; SUBCUTANEOUS EVERY 30 MIN PRN
Status: CANCELLED | OUTPATIENT
Start: 2018-07-18

## 2018-07-03 RX ORDER — EPINEPHRINE 0.3 MG/.3ML
0.3 INJECTION SUBCUTANEOUS EVERY 5 MIN PRN
Status: CANCELLED | OUTPATIENT
Start: 2018-07-18

## 2018-07-03 RX ORDER — HEPARIN SODIUM (PORCINE) LOCK FLUSH IV SOLN 100 UNIT/ML 100 UNIT/ML
5 SOLUTION INTRAVENOUS EVERY 8 HOURS
Status: DISCONTINUED | OUTPATIENT
Start: 2018-07-03 | End: 2018-07-03 | Stop reason: HOSPADM

## 2018-07-03 RX ORDER — DIPHENHYDRAMINE HCL 25 MG
50 CAPSULE ORAL ONCE
Status: COMPLETED | OUTPATIENT
Start: 2018-07-03 | End: 2018-07-03

## 2018-07-03 RX ORDER — METHYLPREDNISOLONE SODIUM SUCCINATE 125 MG/2ML
125 INJECTION, POWDER, LYOPHILIZED, FOR SOLUTION INTRAMUSCULAR; INTRAVENOUS
Status: CANCELLED
Start: 2018-07-18

## 2018-07-03 RX ORDER — METHYLPREDNISOLONE SODIUM SUCCINATE 125 MG/2ML
125 INJECTION, POWDER, LYOPHILIZED, FOR SOLUTION INTRAMUSCULAR; INTRAVENOUS
Status: CANCELLED
Start: 2018-07-25

## 2018-07-03 RX ORDER — DEXAMETHASONE 4 MG/1
TABLET ORAL
Qty: 28 TABLET | Refills: 0 | Status: SHIPPED | OUTPATIENT
Start: 2018-07-03 | End: 2018-07-11

## 2018-07-03 RX ORDER — OXYCODONE HYDROCHLORIDE 15 MG/1
15 TABLET ORAL EVERY 8 HOURS PRN
Qty: 60 TABLET | Refills: 0 | Status: SHIPPED | OUTPATIENT
Start: 2018-07-03 | End: 2018-07-18

## 2018-07-03 RX ORDER — SODIUM CHLORIDE 9 MG/ML
1000 INJECTION, SOLUTION INTRAVENOUS CONTINUOUS PRN
Status: CANCELLED
Start: 2018-07-25

## 2018-07-03 RX ORDER — HEPARIN SODIUM (PORCINE) LOCK FLUSH IV SOLN 100 UNIT/ML 100 UNIT/ML
5 SOLUTION INTRAVENOUS EVERY 8 HOURS
Status: CANCELLED
Start: 2018-07-18

## 2018-07-03 RX ORDER — SODIUM CHLORIDE 9 MG/ML
1000 INJECTION, SOLUTION INTRAVENOUS CONTINUOUS PRN
Status: CANCELLED
Start: 2018-07-18

## 2018-07-03 RX ORDER — ALBUTEROL SULFATE 0.83 MG/ML
2.5 SOLUTION RESPIRATORY (INHALATION)
Status: CANCELLED | OUTPATIENT
Start: 2018-07-18

## 2018-07-03 RX ORDER — ACETAMINOPHEN 325 MG/1
650 TABLET ORAL ONCE
Status: CANCELLED | OUTPATIENT
Start: 2018-07-03

## 2018-07-03 RX ORDER — HEPARIN SODIUM (PORCINE) LOCK FLUSH IV SOLN 100 UNIT/ML 100 UNIT/ML
5 SOLUTION INTRAVENOUS EVERY 8 HOURS
Status: CANCELLED
Start: 2018-07-03

## 2018-07-03 RX ORDER — LORAZEPAM 2 MG/ML
0.5 INJECTION INTRAMUSCULAR EVERY 4 HOURS PRN
Status: CANCELLED
Start: 2018-07-03

## 2018-07-03 RX ORDER — LORAZEPAM 2 MG/ML
0.5 INJECTION INTRAMUSCULAR EVERY 4 HOURS PRN
Status: CANCELLED
Start: 2018-07-25

## 2018-07-03 RX ORDER — ALBUTEROL SULFATE 90 UG/1
1-2 AEROSOL, METERED RESPIRATORY (INHALATION)
Status: CANCELLED
Start: 2018-07-03

## 2018-07-03 RX ORDER — ALBUTEROL SULFATE 0.83 MG/ML
2.5 SOLUTION RESPIRATORY (INHALATION)
Status: CANCELLED | OUTPATIENT
Start: 2018-07-25

## 2018-07-03 RX ORDER — EPINEPHRINE 1 MG/ML
0.3 INJECTION, SOLUTION INTRAMUSCULAR; SUBCUTANEOUS EVERY 5 MIN PRN
Status: CANCELLED | OUTPATIENT
Start: 2018-07-11

## 2018-07-03 RX ORDER — ALBUTEROL SULFATE 90 UG/1
1-2 AEROSOL, METERED RESPIRATORY (INHALATION)
Status: CANCELLED
Start: 2018-07-25

## 2018-07-03 RX ORDER — MEPERIDINE HYDROCHLORIDE 25 MG/ML
25 INJECTION INTRAMUSCULAR; INTRAVENOUS; SUBCUTANEOUS EVERY 30 MIN PRN
Status: CANCELLED | OUTPATIENT
Start: 2018-07-25

## 2018-07-03 RX ORDER — ALBUTEROL SULFATE 90 UG/1
1-2 AEROSOL, METERED RESPIRATORY (INHALATION)
Status: CANCELLED
Start: 2018-07-18

## 2018-07-03 RX ORDER — DIPHENHYDRAMINE HYDROCHLORIDE 50 MG/ML
50 INJECTION INTRAMUSCULAR; INTRAVENOUS
Status: CANCELLED
Start: 2018-07-25

## 2018-07-03 RX ORDER — ACETAMINOPHEN 325 MG/1
650 TABLET ORAL ONCE
Status: COMPLETED | OUTPATIENT
Start: 2018-07-03 | End: 2018-07-03

## 2018-07-03 RX ORDER — ALBUTEROL SULFATE 90 UG/1
1-2 AEROSOL, METERED RESPIRATORY (INHALATION)
Status: CANCELLED
Start: 2018-07-11

## 2018-07-03 RX ORDER — ALBUTEROL SULFATE 0.83 MG/ML
2.5 SOLUTION RESPIRATORY (INHALATION)
Status: CANCELLED | OUTPATIENT
Start: 2018-07-03

## 2018-07-03 RX ORDER — EPINEPHRINE 1 MG/ML
0.3 INJECTION, SOLUTION INTRAMUSCULAR; SUBCUTANEOUS EVERY 5 MIN PRN
Status: CANCELLED | OUTPATIENT
Start: 2018-07-25

## 2018-07-03 RX ORDER — EPINEPHRINE 0.3 MG/.3ML
0.3 INJECTION SUBCUTANEOUS EVERY 5 MIN PRN
Status: CANCELLED | OUTPATIENT
Start: 2018-07-11

## 2018-07-03 RX ORDER — HEPARIN SODIUM (PORCINE) LOCK FLUSH IV SOLN 100 UNIT/ML 100 UNIT/ML
5 SOLUTION INTRAVENOUS EVERY 8 HOURS
Status: CANCELLED
Start: 2018-07-25

## 2018-07-03 RX ORDER — SODIUM CHLORIDE 9 MG/ML
1000 INJECTION, SOLUTION INTRAVENOUS CONTINUOUS PRN
Status: CANCELLED
Start: 2018-07-11

## 2018-07-03 RX ORDER — DIPHENHYDRAMINE HYDROCHLORIDE 50 MG/ML
50 INJECTION INTRAMUSCULAR; INTRAVENOUS
Status: CANCELLED
Start: 2018-07-03

## 2018-07-03 RX ORDER — EPINEPHRINE 0.3 MG/.3ML
0.3 INJECTION SUBCUTANEOUS EVERY 5 MIN PRN
Status: CANCELLED | OUTPATIENT
Start: 2018-07-03

## 2018-07-03 RX ORDER — EPINEPHRINE 1 MG/ML
0.3 INJECTION, SOLUTION INTRAMUSCULAR; SUBCUTANEOUS EVERY 5 MIN PRN
Status: CANCELLED | OUTPATIENT
Start: 2018-07-18

## 2018-07-03 RX ORDER — DIPHENHYDRAMINE HYDROCHLORIDE 50 MG/ML
50 INJECTION INTRAMUSCULAR; INTRAVENOUS
Status: CANCELLED
Start: 2018-07-18

## 2018-07-03 RX ADMIN — DEXAMETHASONE SODIUM PHOSPHATE 12 MG: 10 INJECTION, SOLUTION INTRAMUSCULAR; INTRAVENOUS at 10:24

## 2018-07-03 RX ADMIN — BORTEZOMIB 2.3 MG: 3.5 INJECTION, POWDER, LYOPHILIZED, FOR SOLUTION INTRAVENOUS; SUBCUTANEOUS at 12:41

## 2018-07-03 RX ADMIN — ACETAMINOPHEN 650 MG: 325 TABLET ORAL at 10:31

## 2018-07-03 RX ADMIN — DARATUMUMAB 1000 MG: 100 INJECTION, SOLUTION, CONCENTRATE INTRAVENOUS at 11:01

## 2018-07-03 RX ADMIN — SODIUM CHLORIDE 250 ML: 9 INJECTION, SOLUTION INTRAVENOUS at 10:24

## 2018-07-03 RX ADMIN — DIPHENHYDRAMINE HYDROCHLORIDE 50 MG: 25 CAPSULE ORAL at 10:32

## 2018-07-03 RX ADMIN — SODIUM CHLORIDE, PRESERVATIVE FREE 5 ML: 5 INJECTION INTRAVENOUS at 13:27

## 2018-07-03 ASSESSMENT — PAIN SCALES - GENERAL
PAINLEVEL: MILD PAIN (3)
PAINLEVEL: MILD PAIN (3)

## 2018-07-03 NOTE — MR AVS SNAPSHOT
Amira IVY Arreola   7/3/2018   Anticoagulation Therapy Visit    Description:  85 year old female   Provider:  Addy Frias MD   Department:  [facility]           INR as of 7/3/2018     Today's INR 3.16!      Anticoagulation Summary as of 7/3/2018     INR goal 2.0-3.0   Today's INR 3.16!   Full warfarin instructions 7/3: 2 mg; Otherwise 4 mg every day   Next INR check 7/10/2018    Indications   Long-term (current) use of anticoagulants [Z79.01] [Z79.01]  Atrial fibrillation (H) [I48.91] (Resolved) [I48.91]         July 2018 Details    Sun Mon Tue Wed Thu Fri Sat     1               2               3      2 mg   See details      4      4 mg         5      4 mg         6      4 mg         7      4 mg           8      4 mg         9      4 mg         10            11               12               13               14                 15               16               17               18               19               20               21                 22               23               24               25               26               27               28                 29               30               31                    Date Details   07/03 This INR check       Date of next INR:  7/10/2018         How to take your warfarin dose     To take:  2 mg Take 0.5 of a 4 mg tablet.    To take:  4 mg Take 1 of the 4 mg tablets.

## 2018-07-03 NOTE — PROGRESS NOTES
ANTICOAGULATION FOLLOW-UP CLINIC VISIT    Patient Name:  Amira Arreola  Date:  7/3/2018  Contact Type:  Telephone    SUBJECTIVE:     Patient Findings     Positives Change in diet/appetite (Decreased appetite )           OBJECTIVE    INR   Date Value Ref Range Status   07/03/2018 3.16 (H) 0.86 - 1.14 Final       ASSESSMENT / PLAN  INR assessment SUPRA    Recheck INR In: 1 WEEK    INR Location Outside lab      Anticoagulation Summary as of 7/3/2018     INR goal 2.0-3.0   Today's INR 3.16!   Warfarin maintenance plan 4 mg (4 mg x 1) every day   Full warfarin instructions 7/3: 2 mg; Otherwise 4 mg every day   Weekly warfarin total 28 mg   Plan last modified Bri Mcbride RN (6/7/2018)   Next INR check 7/10/2018   Target end date Indefinite    Indications   Long-term (current) use of anticoagulants [Z79.01] [Z79.01]  Atrial fibrillation (H) [I48.91] (Resolved) [I48.91]         Anticoagulation Episode Summary     INR check location     Preferred lab     Send INR reminders to [facility]AG[facility]    Comments patient have standing INR order to be draw at infusion visit.  advise to call  [facility] when have INR draw for dosing instruction.  patient can be reach at home phone [phone]      Anticoagulation Care Providers     Provider Role Specialty Phone number    Addy Frias MD [facility] [phone]            See the Encounter Report to view Anticoagulation Flowsheet and Dosing Calendar (Go to Encounters tab in chart review, and find the Anticoagulation Therapy Visit)    INRs drawn at patient's infusion appointment. Pt states she has not been eating as well lately (appetite decreased) - denies other changes. Dosage adjustment for warfarin made based on physician directed care plan. Advised recheck INR in 1 week.     Pt aware if signs of clotting (pain, tenderness, swelling, color change in leg or arm, SOB) and bleeding occur (blood in stool, urine, large bruising, bleeding gums, nosebleeds)  to have INR check sooner. If sx severe report to ER or concerned for stroke call 911. If general questions or concerns arise, call clinic.     Aruna Fajardo RN

## 2018-07-03 NOTE — PROGRESS NOTES
Infusion Nursing Note:  Amira Arreola presents today for velcade/darzalex.    Patient seen by provider today: Yes: Todd Loya   present during visit today: Not Applicable.    Note: Refill for oxycodone signed by KERVIN Loya NP.  Decadron e-presribe to [facility] [place]    Intravenous Access:  Implanted Port.    Treatment Conditions:  Lab Results   Component Value Date    HGB 12.1 07/03/2018     Lab Results   Component Value Date    WBC 8.0 07/03/2018      Lab Results   Component Value Date    ANEU 7.5 07/03/2018     Lab Results   Component Value Date     07/03/2018      Lab Results   Component Value Date     06/13/2017                   Lab Results   Component Value Date    POTASSIUM 4.0 06/13/2017           No results found for: MAG         Lab Results   Component Value Date    CR 0.66 04/25/2018                   Lab Results   Component Value Date    BRADLEY 8.8 04/25/2018                Lab Results   Component Value Date    BILITOTAL 0.7 07/03/2018           Lab Results   Component Value Date    ALBUMIN 3.4 07/03/2018                    Lab Results   Component Value Date    ALT 25 07/03/2018           Lab Results   Component Value Date    AST 19 07/03/2018       Results reviewed, labs MET treatment parameters, ok to proceed with treatment.      Post Infusion Assessment:  Patient tolerated infusion without incident.  Patient tolerated injection without incident.  Blood return noted pre and post infusion.  Site patent and intact, free from redness, edema or discomfort.  No evidence of extravasations.  Access discontinued per protocol.    Discharge Plan:   Discharge instructions reviewed with: Patient.  Patient and/or family verbalized understanding of discharge instructions and all questions answered.  Copy of AVS reviewed with patient and/or family.  Patient will return 7/11/18 for next appointment.  Patient discharged in stable condition accompanied by: self.  Departure Mode:  Ambulatory.    Mera Johnson RN

## 2018-07-03 NOTE — MR AVS SNAPSHOT
After Visit Summary   7/3/2018    Amira Arreola    MRN: [id number]           Patient Information     Date Of Birth          7/17/1932        Visit Information        Provider Department      7/3/2018 9:30 AM  INFUSION CHAIR 15 [facility]        Today's Diagnoses     Multiple myeloma not having achieved remission (H)    -  1    Paroxysmal atrial fibrillation (H)           Follow-ups after your visit        Your next 10 appointments already scheduled     Jul 11, 2018  9:30 AM CDT   Level 2 with  INFUSION CHAIR 20   [facility] ([facility])    [place] [id number]  [place] [address]   [phone]            Jul 13, 2018  1:45 PM CDT   Ech Complete with SHCVECDEVYN   [facility] CV Echocardiography (Cardiovascular Imaging at [facility])    [address] [place] [id number] [phone]           1. Please bring or wear a comfortable two-piece outfit. 2. You may eat, drink and take your normal medicines. 3. For any questions that cannot be answered, please contact the ordering physician            Jul 18, 2018  9:30 AM CDT   Level 2 with  INFUSION CHAIR 15   [facility] ([facility])    [facility]  [address]  [place] [address]   [phone]            Jul 18, 2018 10:40 AM CDT   Return Visit with Shayne Roberts MD   [facility] ([facility])    [facility]  [address]  [place] [address] [phone]              Who to contact     If you have questions or need follow up information about today's clinic visit or your schedule please contact [facility] directly at [phone].  Normal or non-critical lab and imaging results will be communicated to you by  "MyChart, letter or phone within 4 business days after the clinic has received the results. If you do not hear from us within 7 days, please contact the clinic through MyChart or phone. If you have a critical or abnormal lab result, we will notify you by phone as soon as possible.  Submit refill requests through doxo or call your pharmacy and they will forward the refill request to us. Please allow 3 business days for your refill to be completed.          Additional Information About Your Visit        Care EveryWhere ID     This is your Care EveryWhere ID. This could be used by other organizations to access your [place] medical records  [id number]        Your Vitals Were     Pulse Temperature Respirations Height BMI (Body Mass Index)       75 98.5  F (36.9  C) (Oral) 18 1.664 m (5' 5.51\") 23.75 kg/m2        Blood Pressure from Last 3 Encounters:   07/03/18 145/70   06/28/18 (!) 138/93   06/27/18 135/79    Weight from Last 3 Encounters:   07/03/18 65.8 kg (145 lb)   06/28/18 65.8 kg (145 lb)   06/27/18 66.6 kg (146 lb 12.8 oz)              We Performed the Following     CBC with platelets differential     Hepatic panel     INR     Kappa and lambda light chain     Protein electrophoresis          Today's Medication Changes          These changes are accurate as of 7/3/18  1:28 PM.  If you have any questions, ask your nurse or doctor.               These medicines have changed or have updated prescriptions.        Dose/Directions    * dexamethasone 4 MG tablet   Commonly known as:  DECADRON   This may have changed:  Another medication with the same name was added. Make sure you understand how and when to take each.   Used for:  Multiple myeloma not having achieved remission (H)        Take 20mg (5 tablets) by mouth every week on the morning of velcade injection.   Quantity:  28 tablet   Refills:  0       * dexamethasone 4 MG tablet   Commonly known as:  DECADRON   This may have changed:  Another medication with " the same name was added. Make sure you understand how and when to take each.   Used for:  Multiple myeloma not having achieved remission (H)        Take 20mg (5 tablets) by mouth every week on the morning of velcade injection.   Quantity:  28 tablet   Refills:  0       * dexamethasone 4 MG tablet   Commonly known as:  DECADRON   This may have changed:  Another medication with the same name was added. Make sure you understand how and when to take each.   Used for:  Multiple myeloma not having achieved remission (H)        Take 20mg (5 tablets) by mouth every week on the morning of velcade injection.   Quantity:  28 tablet   Refills:  0       * dexamethasone 4 MG tablet   Commonly known as:  DECADRON   This may have changed:  You were already taking a medication with the same name, and this prescription was added. Make sure you understand how and when to take each.   Used for:  Multiple myeloma not having achieved remission (H)        Take 20mg (5 tablets) by mouth every week on the morning of velcade injection.   Quantity:  28 tablet   Refills:  0       * Notice:  This list has 4 medication(s) that are the same as other medications prescribed for you. Read the directions carefully, and ask your doctor or other care provider to review them with you.         Where to get your medicines      These medications were sent to [facility] [address] [place] [address] AT [facility] of [facility] & [facility] [address]  [address] [place] [address] 7, [facility] [address]     Phone:  [phone]     dexamethasone 4 MG tablet         Some of these will need a paper prescription and others can be bought over the counter.  Ask your nurse if you have questions.     Bring a paper prescription for each of these medications     oxyCODONE IR 15 MG tablet               Information about OPIOIDS     PRESCRIPTION OPIOIDS: WHAT YOU NEED TO KNOW   We gave you an opioid (narcotic) pain medicine. It is important to manage your pain, but opioids are not always  the best choice. You should first try all the other options your care team gave you. Take this medicine for as short a time (and as few doses) as possible.     These medicines have risks:    DO NOT drive when on new or higher doses of pain medicine. These medicines can affect your alertness and reaction times, and you could be arrested for driving under the influence (DUI). If you need to use opioids long-term, talk to your care team about driving.    DO NOT operate heave machinery    DO NOT do any other dangerous activities while taking these medicines.     DO NOT drink any alcohol while taking these medicines.      If the opioid prescribed includes acetaminophen, DO NOT take with any other medicines that contain acetaminophen. Read all labels carefully. Look for the word  acetaminophen  or  Tylenol.  Ask your pharmacist if you have questions or are unsure.    You can get addicted to pain medicines, especially if you have a history of addiction (chemical, alcohol or substance dependence). Talk to your care team about ways to reduce this risk.    Store your pills in a secure place, locked if possible. We will not replace any lost or stolen medicine. If you don t finish your medicine, please throw away (dispose) as directed by your pharmacist. The [place] Pollution Control Agency has more information about safe disposal: https://www.pca.[facility].[place]/living-green/managing-unwanted-medications.     All opioids tend to cause constipation. Drink plenty of water and eat foods that have a lot of fiber, such as fruits, vegetables, prune juice, apple juice and high-fiber cereal. Take a laxative (Miralax, milk of magnesia, Colace, Senna) if you don t move your bowels at least every other day.          Primary Care Provider Office Phone # Fax #    Addy Frias [phone] [address] [facility] [address]  [facility] [address]        Equal Access to Services     SARA ZELAYA AH: Gissel Galloway  rhonda iselaalexis, qaybkianna kawen gupta, hung lancasteraamorteza ah. So [facility] [phone].    ATENCIÓN: Si feli park, tiene a barlow disposición servicios gratuitos de asistencia lingüística. Kadie al [phone].    We comply with applicable federal civil rights laws and [place] laws. We do not discriminate on the basis of race, color, national origin, age, disability, sex, sexual orientation, or gender identity.            Thank you!     Thank you for choosing [facility]  for your care. Our goal is always to provide you with excellent care. Hearing back from our patients is one way we can continue to improve our services. Please take a few minutes to complete the written survey that you may receive in the mail after your visit with us. Thank you!             Your Updated Medication List - Protect others around you: Learn how to safely use, store and throw away your medicines at www.disposemymeds.org.          This list is accurate as of 7/3/18  1:28 PM.  Always use your most recent med list.                   Brand Name Dispense Instructions for use Diagnosis    acyclovir 400 MG tablet    ZOVIRAX    60 tablet    Take 1 tablet (400 mg) by mouth 2 times daily Start 1 week prior to daratumumab and continue for 3 months after treatment is complete.    Multiple myeloma not having achieved remission (H)       CALCIUM CITRATE + PO      Take 2,000 mg by mouth daily 2 tabs        carboxymethylcellulose 0.5 % Soln ophthalmic solution    REFRESH PLUS     1 drop 4 times daily        CLARINEX PO      Take by mouth daily Taking claritin        COMPAZINE PO      Take 10 mg by mouth daily as needed        cycloSPORINE 0.05 % ophthalmic emulsion    RESTASIS     Place 1 drop into both eyes every 12 hours        DAILY MULTIVITAMIN PO      Take 1 tablet by mouth daily.    Routine general medical examination at a health care facility       * dexamethasone 4 MG tablet    DECADRON     28 tablet    Take 20mg (5 tablets) by mouth every week on the morning of velcade injection.    Multiple myeloma not having achieved remission (H)       * dexamethasone 4 MG tablet    DECADRON    28 tablet    Take 20mg (5 tablets) by mouth every week on the morning of velcade injection.    Multiple myeloma not having achieved remission (H)       * dexamethasone 4 MG tablet    DECADRON    28 tablet    Take 20mg (5 tablets) by mouth every week on the morning of velcade injection.    Multiple myeloma not having achieved remission (H)       * dexamethasone 4 MG tablet    DECADRON    28 tablet    Take 20mg (5 tablets) by mouth every week on the morning of velcade injection.    Multiple myeloma not having achieved remission (H)       erythromycin ophthalmic ointment    ROMYCIN     Place 1 Application into both eyes At Bedtime        lidocaine-prilocaine cream    EMLA    30 g    Apply topically as needed for moderate pain Apply dollop size amount to port site 30-60 min prior to accessing    Multiple myeloma not having achieved remission (H)       LORazepam 0.5 MG tablet    ATIVAN    30 tablet    Take 1 tablet (0.5 mg) by mouth every 8 hours as needed for anxiety    Multiple myeloma not having achieved remission (H)       metoprolol succinate 50 MG 24 hr tablet    TOPROL-XL    270 tablet    TAKE 2 TABLETS BY MOUTH EVERY MORNING, AND 1 TABLET EVERY EVENING    Paroxysmal atrial fibrillation (H)       oxyCODONE IR 15 MG tablet    ROXICODONE    60 tablet    Take 1 tablet (15 mg) by mouth every 8 hours as needed for pain maximum 4 tablet(s) per day    Multiple myeloma not having achieved remission (H)       polyethylene glycol powder    MIRALAX/GLYCOLAX     Take 1 capful by mouth daily as needed    Bilateral leg edema       REFRESH OP           SIMBRINZA 1-0.2 % ophthalmic suspension   Generic drug:  brinzolamide-brimonidine      Place 1 drop into the right eye 2 times daily 1 drop AM and PM        triamcinolone 0.5 % cream     KENALOG    15 g    Apply sparingly to affected area three times daily. 1-2 weeks , as needed    Rash and nonspecific skin eruption       TYLENOL PO      Take 500 mg by mouth every 6 hours as needed for mild pain or fever        UNABLE TO FIND      MEDICATION NAME: Fresh Coat eye drops        VITAMIN D3 PO      Take 1,000 Units by mouth daily        warfarin 4 MG tablet    COUMADIN    110 tablet    TAKE 1-2 TABLETS BY MOUTH EVERY DAY AS DIRECTED BY INR CLINIC    Paroxysmal atrial fibrillation (H), Long-term (current) use of anticoagulants       ZOMETA IV      Inject into the vein every 30 days Every 3 month dosing        * Notice:  This list has 4 medication(s) that are the same as other medications prescribed for you. Read the directions carefully, and ask your doctor or other care provider to review them with you.

## 2018-07-05 LAB
ALBUMIN SERPL ELPH-MCNC: 3.7 G/DL (ref 3.7–5.1)
ALPHA1 GLOB SERPL ELPH-MCNC: 0.4 G/DL (ref 0.2–0.4)
ALPHA2 GLOB SERPL ELPH-MCNC: 0.7 G/DL (ref 0.5–0.9)
B-GLOBULIN SERPL ELPH-MCNC: 0.6 G/DL (ref 0.6–1)
GAMMA GLOB SERPL ELPH-MCNC: 0.2 G/DL (ref 0.7–1.6)
M PROTEIN SERPL ELPH-MCNC: 0 G/DL
PROT PATTERN SERPL ELPH-IMP: ABNORMAL

## 2018-07-06 LAB
KAPPA LC UR-MCNC: 1.74 MG/DL (ref 0.33–1.94)
KAPPA LC/LAMBDA SER: ABNORMAL {RATIO} (ref 0.26–1.65)
LAMBDA LC SERPL-MCNC: <0.25 MG/DL (ref 0.57–2.63)

## 2018-07-11 ENCOUNTER — ANTICOAGULATION THERAPY VISIT (OUTPATIENT)
Dept: FAMILY MEDICINE | Facility: CLINIC | Age: 83
End: 2018-07-11
Payer: COMMERCIAL

## 2018-07-11 ENCOUNTER — HOSPITAL ENCOUNTER (OUTPATIENT)
Facility: CLINIC | Age: 83
Setting detail: SPECIMEN
Discharge: HOME | End: 2018-07-11
Attending: INTERNAL MEDICINE | Admitting: NURSE PRACTITIONER
Payer: MEDICARE

## 2018-07-11 ENCOUNTER — INFUSION THERAPY VISIT (OUTPATIENT)
Dept: INFUSION THERAPY | Facility: CLINIC | Age: 83
End: 2018-07-11
Attending: INTERNAL MEDICINE
Payer: MEDICARE

## 2018-07-11 VITALS
HEIGHT: 66 IN | WEIGHT: 143.4 LBS | SYSTOLIC BLOOD PRESSURE: 154 MMHG | OXYGEN SATURATION: 99 % | TEMPERATURE: 98.1 F | BODY MASS INDEX: 23.05 KG/M2 | HEART RATE: 68 BPM | RESPIRATION RATE: 16 BRPM | DIASTOLIC BLOOD PRESSURE: 89 MMHG

## 2018-07-11 DIAGNOSIS — C90.00 MULTIPLE MYELOMA NOT HAVING ACHIEVED REMISSION (H): Primary | ICD-10-CM

## 2018-07-11 DIAGNOSIS — Z79.01 LONG-TERM (CURRENT) USE OF ANTICOAGULANTS: ICD-10-CM

## 2018-07-11 DIAGNOSIS — I48.0 PAROXYSMAL ATRIAL FIBRILLATION (H): ICD-10-CM

## 2018-07-11 LAB
BASOPHILS # BLD AUTO: 0 10E9/L (ref 0–0.2)
BASOPHILS NFR BLD AUTO: 0 %
DIFFERENTIAL METHOD BLD: ABNORMAL
EOSINOPHIL # BLD AUTO: 0 10E9/L (ref 0–0.7)
EOSINOPHIL NFR BLD AUTO: 0 %
ERYTHROCYTE [DISTWIDTH] IN BLOOD BY AUTOMATED COUNT: 14.4 % (ref 10–15)
HCT VFR BLD AUTO: 35.5 % (ref 35–47)
HGB BLD-MCNC: 11.8 G/DL (ref 11.7–15.7)
IMM GRANULOCYTES # BLD: 0 10E9/L (ref 0–0.4)
IMM GRANULOCYTES NFR BLD: 0.3 %
INR PPP: 2.08
INR PPP: 2.08 (ref 0.86–1.14)
LYMPHOCYTES # BLD AUTO: 0.4 10E9/L (ref 0.8–5.3)
LYMPHOCYTES NFR BLD AUTO: 6.1 %
MCH RBC QN AUTO: 32.7 PG (ref 26.5–33)
MCHC RBC AUTO-ENTMCNC: 33.2 G/DL (ref 31.5–36.5)
MCV RBC AUTO: 98 FL (ref 78–100)
MONOCYTES # BLD AUTO: 0.1 10E9/L (ref 0–1.3)
MONOCYTES NFR BLD AUTO: 1.4 %
NEUTROPHILS # BLD AUTO: 5.8 10E9/L (ref 1.6–8.3)
NEUTROPHILS NFR BLD AUTO: 92.2 %
NRBC # BLD AUTO: 0 10*3/UL
NRBC BLD AUTO-RTO: 0 /100
PLATELET # BLD AUTO: 159 10E9/L (ref 150–450)
RBC # BLD AUTO: 3.61 10E12/L (ref 3.8–5.2)
WBC # BLD AUTO: 6.2 10E9/L (ref 4–11)

## 2018-07-11 PROCEDURE — 85025 COMPLETE CBC W/AUTO DIFF WBC: CPT | Performed by: NURSE PRACTITIONER

## 2018-07-11 PROCEDURE — 99207 ZZC NO CHARGE NURSE ONLY: CPT | Performed by: INTERNAL MEDICINE

## 2018-07-11 PROCEDURE — 96401 CHEMO ANTI-NEOPL SQ/IM: CPT

## 2018-07-11 PROCEDURE — 25000128 H RX IP 250 OP 636: Performed by: NURSE PRACTITIONER

## 2018-07-11 PROCEDURE — 85610 PROTHROMBIN TIME: CPT | Performed by: INTERNAL MEDICINE

## 2018-07-11 RX ORDER — LIDOCAINE/PRILOCAINE 2.5 %-2.5%
CREAM (GRAM) TOPICAL
Qty: 30 G | Refills: 1 | Status: SHIPPED | OUTPATIENT
Start: 2018-07-11 | End: 2022-01-01

## 2018-07-11 RX ORDER — ACYCLOVIR 400 MG/1
400 TABLET ORAL 2 TIMES DAILY
COMMUNITY
End: 2019-04-11

## 2018-07-11 RX ORDER — HEPARIN SODIUM (PORCINE) LOCK FLUSH IV SOLN 100 UNIT/ML 100 UNIT/ML
5 SOLUTION INTRAVENOUS EVERY 8 HOURS
Status: DISCONTINUED | OUTPATIENT
Start: 2018-07-11 | End: 2018-07-11 | Stop reason: HOSPADM

## 2018-07-11 RX ADMIN — BORTEZOMIB 2.3 MG: 3.5 INJECTION, POWDER, LYOPHILIZED, FOR SOLUTION INTRAVENOUS; SUBCUTANEOUS at 10:58

## 2018-07-11 RX ADMIN — SODIUM CHLORIDE, PRESERVATIVE FREE 5 ML: 5 INJECTION INTRAVENOUS at 10:58

## 2018-07-11 ASSESSMENT — PAIN SCALES - GENERAL: PAINLEVEL: NO PAIN (0)

## 2018-07-11 NOTE — MR AVS SNAPSHOT
Amira Arreola   7/11/2018   Anticoagulation Therapy Visit    Description:  85 year old female   Provider:  Addy Frias MD   Department:  [facility]           INR as of 7/11/2018     Today's INR 2.08      Anticoagulation Summary as of 7/11/2018     INR goal 2.0-3.0   Today's INR 2.08   Full warfarin instructions 4 mg every day   Next INR check 7/18/2018    Indications   Long-term (current) use of anticoagulants [Z79.01] [Z79.01]  Atrial fibrillation (H) [I48.91] (Resolved) [I48.91]         July 2018 Details    Sun Mon Tue Wed Thu Fri Sat     1               2               3               4               5               6               7                 8               9               10               11      4 mg   See details      12      4 mg         13      4 mg         14      4 mg           15      4 mg         16      4 mg         17      4 mg         18            19               20               21                 22               23               24               25               26               27               28                 29               30               31                    Date Details   07/11 This INR check       Date of next INR:  7/18/2018         How to take your warfarin dose     To take:  4 mg Take 1 of the 4 mg tablets.

## 2018-07-11 NOTE — PROGRESS NOTES
Infusion Nursing Note:  Amira Arreola presents today for C15 D8 Velcade.    Patient seen by provider today: No   present during visit today: Not Applicable.    Note: Patient reports increase in fatigue and weakness in the past week, no other new concerns. Patient confirmed she is taking oral dexamethasone and acyclovir as needed. Denies need for refill at this time, requests refill on emla cream, sent to [facility] per patient request.    Intravenous Access:  Labs drawn without difficulty.  Implanted Port.    Treatment Conditions:  Lab Results   Component Value Date    HGB 11.8 07/11/2018     Lab Results   Component Value Date    WBC 6.2 07/11/2018      Lab Results   Component Value Date    ANEU 5.8 07/11/2018     Lab Results   Component Value Date     07/11/2018      Results reviewed, labs MET treatment parameters, ok to proceed with treatment.      Post Infusion Assessment:  Patient tolerated injection without incident. Velcade given SQ in right mid abdomen.  Site patent and intact, free from redness, edema or discomfort.  No evidence of extravasations.  Access discontinued per protocol.    Discharge Plan:   Discharge instructions reviewed with: Patient.  Patient and/or family verbalized understanding of discharge instructions and all questions answered.  Copy of AVS reviewed with patient and/or family.  Patient will return 7/18 for next appointment.  Patient discharged in stable condition accompanied by: self.  Departure Mode: Ambulatory.    Allyn Corona RN

## 2018-07-11 NOTE — MR AVS SNAPSHOT
After Visit Summary   7/11/2018    Amira Arreola    MRN: [id number]           Patient Information     Date Of Birth          7/17/1932        Visit Information        Provider Department      7/11/2018 9:30 AM  INFUSION CHAIR 20 [facility]        Today's Diagnoses     Multiple myeloma not having achieved remission (H)    -  1    Paroxysmal atrial fibrillation (H)           Follow-ups after your visit        Your next 10 appointments already scheduled     Jul 13, 2018  1:45 PM CDT   Ech Complete with [id number]   [facility] Echocardiography ([facility] at [facility])    [address]  [address]  [facility] [address]   [phone]           1.  Please bring or wear a comfortable two-piece outfit. 2.  You may eat, drink and take your normal medicines. 3.  For any questions that cannot be answered, please contact the ordering physician 4.  Please do not wear perfumes or scented lotions on the day of your exam.            Jul 18, 2018  9:30 AM CDT   Level 2 with  INFUSION CHAIR 15   [facility] ([facility])    [facility]  [address]  [facility] [address]   [phone]            Jul 18, 2018 10:40 AM CDT   Return Visit with Shayne Roberts MD   [facility])    [facility]  [address]  [facility] [address] [phone]              Who to contact     If you have questions or need follow up information about today's clinic visit or your schedule please contact [facility] directly at [phone].  Normal or non-critical lab and imaging results will be communicated to you by MyChart, letter or phone within 4 business days after the clinic has received the results. If you do not hear from us within 7 days, please contact the clinic  "through [employer]hart or phone. If you have a critical or abnormal lab result, we will notify you by phone as soon as possible.  Submit refill requests through [employer] or call your pharmacy and they will forward the refill request to us. Please allow 3 business days for your refill to be completed.          Additional Information About Your Visit        Care EveryWhere ID     This is your Care EveryWhere ID. This could be used by other organizations to access your [place] medical records  [id number]        Your Vitals Were     Pulse Temperature Respirations Height Pulse Oximetry BMI (Body Mass Index)    68 98.1  F (36.7  C) (Oral) 16 1.664 m (5' 5.51\") 99% 23.49 kg/m2       Blood Pressure from Last 3 Encounters:   07/11/18 154/89   07/03/18 145/70   06/28/18 (!) 138/93    Weight from Last 3 Encounters:   07/11/18 65 kg (143 lb 6.4 oz)   07/03/18 65.8 kg (145 lb)   06/28/18 65.8 kg (145 lb)              We Performed the Following     CBC with platelets differential     INR          Today's Medication Changes          These changes are accurate as of 7/11/18 11:06 AM.  If you have any questions, ask your nurse or doctor.               These medicines have changed or have updated prescriptions.        Dose/Directions    * lidocaine-prilocaine cream   Commonly known as:  EMLA   This may have changed:  Another medication with the same name was added. Make sure you understand how and when to take each.   Used for:  Multiple myeloma not having achieved remission (H)        Apply topically as needed for moderate pain Apply dollop size amount to port site 30-60 min prior to accessing   Quantity:  30 g   Refills:  1       * lidocaine-prilocaine cream   Commonly known as:  EMLA   This may have changed:  You were already taking a medication with the same name, and this prescription was added. Make sure you understand how and when to take each.   Used for:  Multiple myeloma not having achieved remission (H)        Apply to port " site 1 hour prior to access   Quantity:  30 g   Refills:  1       * Notice:  This list has 2 medication(s) that are the same as other medications prescribed for you. Read the directions carefully, and ask your doctor or other care provider to review them with you.         Where to get your medicines      These medications were sent to [id number] - [place], [place] - [address] AT [facility] [address]  [address], [place] [address]     Phone:  [phone]     lidocaine-prilocaine cream                Primary Care Provider Office Phone # Fax #    Addy Frias [phone] [address] [address]        Equal Access to Services     SARA ZELAYA AH: Hadii liane ku hadasho Soomaali, waaxda luqadaha, qaybta kaalmada adeegyada, waxay idiin haycesar dominique . So [facility] [phone].    ATENCIÓN: Si habla español, tiene a barlow disposición servicios gratuitos de asistencia lingüística. [facility] [phone].    We comply with applicable federal civil rights laws and [place] laws. We do not discriminate on the basis of race, color, national origin, age, disability, sex, sexual orientation, or gender identity.            Thank you!     Thank you for choosing [facility]  for your care. Our goal is always to provide you with excellent care. Hearing back from our patients is one way we can continue to improve our services. Please take a few minutes to complete the written survey that you may receive in the mail after your visit with us. Thank you!             Your Updated Medication List - Protect others around you: Learn how to safely use, store and throw away your medicines at www.disposemymeds.org.          This list is accurate as of 7/11/18 11:06 AM.  Always use your most recent med list.                   Brand Name Dispense Instructions for use Diagnosis    * ACYCLOVIR PO      Take 400 mg by mouth 2 times  daily        * acyclovir 400 MG tablet    ZOVIRAX    60 tablet    Take 1 tablet (400 mg) by mouth 2 times daily Start 1 week prior to daratumumab and continue for 3 months after treatment is complete.    Multiple myeloma not having achieved remission (H)       CALCIUM CITRATE + PO      Take 2,000 mg by mouth daily 2 tabs        carboxymethylcellulose 0.5 % Soln ophthalmic solution    REFRESH PLUS     1 drop 4 times daily        CLARINEX PO      Take by mouth daily Taking claritin        COMPAZINE PO      Take 10 mg by mouth daily as needed        cycloSPORINE 0.05 % ophthalmic emulsion    RESTASIS     Place 1 drop into both eyes every 12 hours        DAILY MULTIVITAMIN PO      Take 1 tablet by mouth daily.    Routine general medical examination at a health care facility       dexamethasone 4 MG tablet    DECADRON    28 tablet    Take 20mg (5 tablets) by mouth every week on the morning of velcade injection.    Multiple myeloma not having achieved remission (H)       erythromycin ophthalmic ointment    ROMYCIN     Place 1 Application into both eyes At Bedtime        * lidocaine-prilocaine cream    EMLA    30 g    Apply topically as needed for moderate pain Apply dollop size amount to port site 30-60 min prior to accessing    Multiple myeloma not having achieved remission (H)       * lidocaine-prilocaine cream    EMLA    30 g    Apply to port site 1 hour prior to access    Multiple myeloma not having achieved remission (H)       LORazepam 0.5 MG tablet    ATIVAN    30 tablet    Take 1 tablet (0.5 mg) by mouth every 8 hours as needed for anxiety    Multiple myeloma not having achieved remission (H)       metoprolol succinate 50 MG 24 hr tablet    TOPROL-XL    270 tablet    TAKE 2 TABLETS BY MOUTH EVERY MORNING, AND 1 TABLET EVERY EVENING    Paroxysmal atrial fibrillation (H)       oxyCODONE IR 15 MG tablet    ROXICODONE    60 tablet    Take 1 tablet (15 mg) by mouth every 8 hours as needed for pain maximum 4 tablet(s)  per day    Multiple myeloma not having achieved remission (H)       polyethylene glycol powder    MIRALAX/GLYCOLAX     Take 1 capful by mouth daily as needed    Bilateral leg edema       REFRESH OP           SIMBRINZA 1-0.2 % ophthalmic suspension   Generic drug:  brinzolamide-brimonidine      Place 1 drop into the right eye 2 times daily 1 drop AM and PM        triamcinolone 0.5 % cream    KENALOG    15 g    Apply sparingly to affected area three times daily. 1-2 weeks , as needed    Rash and nonspecific skin eruption       TYLENOL PO      Take 500 mg by mouth every 6 hours as needed for mild pain or fever        UNABLE TO FIND      MEDICATION NAME: Fresh Coat eye drops        VITAMIN D3 PO      Take 1,000 Units by mouth daily        warfarin 4 MG tablet    COUMADIN    110 tablet    TAKE 1-2 TABLETS BY MOUTH EVERY DAY AS DIRECTED BY INR CLINIC    Paroxysmal atrial fibrillation (H), Long-term (current) use of anticoagulants       ZOMETA IV      Inject into the vein every 30 days Every 3 month dosing        * Notice:  This list has 4 medication(s) that are the same as other medications prescribed for you. Read the directions carefully, and ask your doctor or other care provider to review them with you.

## 2018-07-13 ENCOUNTER — HOSPITAL ENCOUNTER (OUTPATIENT)
Dept: CARDIOLOGY | Facility: CLINIC | Age: 83
Discharge: HOME | End: 2018-07-13
Attending: INTERNAL MEDICINE | Admitting: INTERNAL MEDICINE
Payer: MEDICARE

## 2018-07-13 DIAGNOSIS — I48.0 PAROXYSMAL ATRIAL FIBRILLATION (H): ICD-10-CM

## 2018-07-13 DIAGNOSIS — I77.810 ASCENDING AORTA DILATATION (H): ICD-10-CM

## 2018-07-13 PROCEDURE — 93306 TTE W/DOPPLER COMPLETE: CPT

## 2018-07-13 PROCEDURE — 93306 TTE W/DOPPLER COMPLETE: CPT | Mod: 26 | Performed by: INTERNAL MEDICINE

## 2018-07-18 ENCOUNTER — HOSPITAL ENCOUNTER (OUTPATIENT)
Facility: CLINIC | Age: 83
Setting detail: SPECIMEN
Discharge: HOME | End: 2018-07-18
Attending: INTERNAL MEDICINE | Admitting: NURSE PRACTITIONER
Payer: MEDICARE

## 2018-07-18 ENCOUNTER — INFUSION THERAPY VISIT (OUTPATIENT)
Dept: INFUSION THERAPY | Facility: CLINIC | Age: 83
End: 2018-07-18
Attending: INTERNAL MEDICINE
Payer: MEDICARE

## 2018-07-18 ENCOUNTER — ANTICOAGULATION THERAPY VISIT (OUTPATIENT)
Dept: FAMILY MEDICINE | Facility: CLINIC | Age: 83
End: 2018-07-18
Payer: COMMERCIAL

## 2018-07-18 ENCOUNTER — ONCOLOGY VISIT (OUTPATIENT)
Dept: ONCOLOGY | Facility: CLINIC | Age: 83
End: 2018-07-18
Attending: INTERNAL MEDICINE
Payer: MEDICARE

## 2018-07-18 VITALS
RESPIRATION RATE: 16 BRPM | TEMPERATURE: 98.3 F | OXYGEN SATURATION: 97 % | WEIGHT: 146 LBS | HEART RATE: 81 BPM | HEIGHT: 65 IN | SYSTOLIC BLOOD PRESSURE: 132 MMHG | DIASTOLIC BLOOD PRESSURE: 76 MMHG | BODY MASS INDEX: 24.32 KG/M2

## 2018-07-18 VITALS
HEIGHT: 65 IN | HEART RATE: 81 BPM | RESPIRATION RATE: 16 BRPM | TEMPERATURE: 98.3 F | BODY MASS INDEX: 24.32 KG/M2 | WEIGHT: 146 LBS | DIASTOLIC BLOOD PRESSURE: 76 MMHG | SYSTOLIC BLOOD PRESSURE: 132 MMHG | OXYGEN SATURATION: 97 %

## 2018-07-18 DIAGNOSIS — Z95.828 PORTACATH IN PLACE: ICD-10-CM

## 2018-07-18 DIAGNOSIS — C90.00 MULTIPLE MYELOMA NOT HAVING ACHIEVED REMISSION (H): Primary | ICD-10-CM

## 2018-07-18 DIAGNOSIS — Z79.01 LONG-TERM (CURRENT) USE OF ANTICOAGULANTS: ICD-10-CM

## 2018-07-18 DIAGNOSIS — I48.0 PAROXYSMAL ATRIAL FIBRILLATION (H): ICD-10-CM

## 2018-07-18 LAB
BASOPHILS # BLD AUTO: 0 10E9/L (ref 0–0.2)
BASOPHILS NFR BLD AUTO: 0.2 %
DIFFERENTIAL METHOD BLD: ABNORMAL
EOSINOPHIL # BLD AUTO: 0 10E9/L (ref 0–0.7)
EOSINOPHIL NFR BLD AUTO: 0.7 %
ERYTHROCYTE [DISTWIDTH] IN BLOOD BY AUTOMATED COUNT: 14.6 % (ref 10–15)
HCT VFR BLD AUTO: 35.2 % (ref 35–47)
HGB BLD-MCNC: 11.6 G/DL (ref 11.7–15.7)
IMM GRANULOCYTES # BLD: 0 10E9/L (ref 0–0.4)
IMM GRANULOCYTES NFR BLD: 0.2 %
INR PPP: 2.52 (ref 0.86–1.14)
LYMPHOCYTES # BLD AUTO: 0.5 10E9/L (ref 0.8–5.3)
LYMPHOCYTES NFR BLD AUTO: 8.8 %
MCH RBC QN AUTO: 32.4 PG (ref 26.5–33)
MCHC RBC AUTO-ENTMCNC: 33 G/DL (ref 31.5–36.5)
MCV RBC AUTO: 98 FL (ref 78–100)
MONOCYTES # BLD AUTO: 0.4 10E9/L (ref 0–1.3)
MONOCYTES NFR BLD AUTO: 7 %
NEUTROPHILS # BLD AUTO: 5.1 10E9/L (ref 1.6–8.3)
NEUTROPHILS NFR BLD AUTO: 83.1 %
NRBC # BLD AUTO: 0 10*3/UL
NRBC BLD AUTO-RTO: 0 /100
PLATELET # BLD AUTO: 149 10E9/L (ref 150–450)
RBC # BLD AUTO: 3.58 10E12/L (ref 3.8–5.2)
WBC # BLD AUTO: 6.1 10E9/L (ref 4–11)

## 2018-07-18 PROCEDURE — 85610 PROTHROMBIN TIME: CPT | Performed by: INTERNAL MEDICINE

## 2018-07-18 PROCEDURE — 85025 COMPLETE CBC W/AUTO DIFF WBC: CPT | Performed by: NURSE PRACTITIONER

## 2018-07-18 PROCEDURE — 99214 OFFICE O/P EST MOD 30 MIN: CPT | Performed by: INTERNAL MEDICINE

## 2018-07-18 PROCEDURE — 25000128 H RX IP 250 OP 636: Performed by: INTERNAL MEDICINE

## 2018-07-18 PROCEDURE — 25000128 H RX IP 250 OP 636: Performed by: NURSE PRACTITIONER

## 2018-07-18 PROCEDURE — 96401 CHEMO ANTI-NEOPL SQ/IM: CPT

## 2018-07-18 PROCEDURE — 99207 ZZC NO CHARGE NURSE ONLY: CPT | Performed by: INTERNAL MEDICINE

## 2018-07-18 RX ORDER — EPINEPHRINE 0.3 MG/.3ML
0.3 INJECTION SUBCUTANEOUS EVERY 5 MIN PRN
Status: CANCELLED | OUTPATIENT
Start: 2018-08-15

## 2018-07-18 RX ORDER — EPINEPHRINE 1 MG/ML
0.3 INJECTION, SOLUTION INTRAMUSCULAR; SUBCUTANEOUS EVERY 5 MIN PRN
Status: CANCELLED | OUTPATIENT
Start: 2018-08-01

## 2018-07-18 RX ORDER — DIPHENHYDRAMINE HYDROCHLORIDE 50 MG/ML
50 INJECTION INTRAMUSCULAR; INTRAVENOUS
Status: CANCELLED
Start: 2018-08-01

## 2018-07-18 RX ORDER — LORAZEPAM 0.5 MG/1
0.5 TABLET ORAL EVERY 8 HOURS PRN
Qty: 30 TABLET | Refills: 3 | Status: SHIPPED | OUTPATIENT
Start: 2018-07-18 | End: 2018-09-12

## 2018-07-18 RX ORDER — ALBUTEROL SULFATE 0.83 MG/ML
2.5 SOLUTION RESPIRATORY (INHALATION)
Status: CANCELLED | OUTPATIENT
Start: 2018-08-01

## 2018-07-18 RX ORDER — DIPHENHYDRAMINE HCL 25 MG
50 CAPSULE ORAL ONCE
Status: CANCELLED | OUTPATIENT
Start: 2018-08-01

## 2018-07-18 RX ORDER — METHYLPREDNISOLONE SODIUM SUCCINATE 125 MG/2ML
125 INJECTION, POWDER, LYOPHILIZED, FOR SOLUTION INTRAMUSCULAR; INTRAVENOUS
Status: CANCELLED
Start: 2018-08-01

## 2018-07-18 RX ORDER — ACETAMINOPHEN 325 MG/1
650 TABLET ORAL ONCE
Status: CANCELLED | OUTPATIENT
Start: 2018-08-01

## 2018-07-18 RX ORDER — SODIUM CHLORIDE 9 MG/ML
1000 INJECTION, SOLUTION INTRAVENOUS CONTINUOUS PRN
Status: CANCELLED
Start: 2018-08-01

## 2018-07-18 RX ORDER — MEPERIDINE HYDROCHLORIDE 25 MG/ML
25 INJECTION INTRAMUSCULAR; INTRAVENOUS; SUBCUTANEOUS EVERY 30 MIN PRN
Status: CANCELLED | OUTPATIENT
Start: 2018-08-15

## 2018-07-18 RX ORDER — HEPARIN SODIUM (PORCINE) LOCK FLUSH IV SOLN 100 UNIT/ML 100 UNIT/ML
500 SOLUTION INTRAVENOUS EVERY 8 HOURS
Status: DISCONTINUED | OUTPATIENT
Start: 2018-07-18 | End: 2018-07-18 | Stop reason: HOSPADM

## 2018-07-18 RX ORDER — EPINEPHRINE 0.3 MG/.3ML
0.3 INJECTION SUBCUTANEOUS EVERY 5 MIN PRN
Status: CANCELLED | OUTPATIENT
Start: 2018-08-01

## 2018-07-18 RX ORDER — SODIUM CHLORIDE 9 MG/ML
1000 INJECTION, SOLUTION INTRAVENOUS CONTINUOUS PRN
Status: CANCELLED
Start: 2018-08-15

## 2018-07-18 RX ORDER — DIPHENHYDRAMINE HYDROCHLORIDE 50 MG/ML
50 INJECTION INTRAMUSCULAR; INTRAVENOUS
Status: CANCELLED
Start: 2018-08-15

## 2018-07-18 RX ORDER — MEPERIDINE HYDROCHLORIDE 25 MG/ML
25 INJECTION INTRAMUSCULAR; INTRAVENOUS; SUBCUTANEOUS EVERY 30 MIN PRN
Status: CANCELLED | OUTPATIENT
Start: 2018-08-01

## 2018-07-18 RX ORDER — HEPARIN SODIUM (PORCINE) LOCK FLUSH IV SOLN 100 UNIT/ML 100 UNIT/ML
500 SOLUTION INTRAVENOUS EVERY 8 HOURS
Status: CANCELLED
Start: 2018-07-18

## 2018-07-18 RX ORDER — METHYLPREDNISOLONE SODIUM SUCCINATE 125 MG/2ML
125 INJECTION, POWDER, LYOPHILIZED, FOR SOLUTION INTRAMUSCULAR; INTRAVENOUS
Status: CANCELLED
Start: 2018-08-15

## 2018-07-18 RX ORDER — HEPARIN SODIUM (PORCINE) LOCK FLUSH IV SOLN 100 UNIT/ML 100 UNIT/ML
5 SOLUTION INTRAVENOUS EVERY 8 HOURS
Status: CANCELLED
Start: 2018-08-01

## 2018-07-18 RX ORDER — DEXAMETHASONE 4 MG/1
TABLET ORAL
Qty: 28 TABLET | Refills: 3 | Status: SHIPPED | OUTPATIENT
Start: 2018-07-18 | End: 2018-08-15

## 2018-07-18 RX ORDER — LORAZEPAM 2 MG/ML
0.5 INJECTION INTRAMUSCULAR EVERY 4 HOURS PRN
Status: CANCELLED
Start: 2018-08-15

## 2018-07-18 RX ORDER — ALBUTEROL SULFATE 0.83 MG/ML
2.5 SOLUTION RESPIRATORY (INHALATION)
Status: CANCELLED | OUTPATIENT
Start: 2018-08-15

## 2018-07-18 RX ORDER — LORAZEPAM 2 MG/ML
0.5 INJECTION INTRAMUSCULAR EVERY 4 HOURS PRN
Status: CANCELLED
Start: 2018-08-01

## 2018-07-18 RX ORDER — ALBUTEROL SULFATE 90 UG/1
1-2 AEROSOL, METERED RESPIRATORY (INHALATION)
Status: CANCELLED
Start: 2018-08-15

## 2018-07-18 RX ORDER — OXYCODONE HYDROCHLORIDE 15 MG/1
15 TABLET ORAL EVERY 8 HOURS PRN
Qty: 90 TABLET | Refills: 0 | Status: SHIPPED | OUTPATIENT
Start: 2018-07-18 | End: 2018-08-15

## 2018-07-18 RX ORDER — HEPARIN SODIUM (PORCINE) LOCK FLUSH IV SOLN 100 UNIT/ML 100 UNIT/ML
5 SOLUTION INTRAVENOUS EVERY 8 HOURS
Status: CANCELLED
Start: 2018-08-15

## 2018-07-18 RX ORDER — ALBUTEROL SULFATE 90 UG/1
1-2 AEROSOL, METERED RESPIRATORY (INHALATION)
Status: CANCELLED
Start: 2018-08-01

## 2018-07-18 RX ORDER — EPINEPHRINE 1 MG/ML
0.3 INJECTION, SOLUTION INTRAMUSCULAR; SUBCUTANEOUS EVERY 5 MIN PRN
Status: CANCELLED | OUTPATIENT
Start: 2018-08-15

## 2018-07-18 RX ADMIN — HEPARIN 500 UNITS: 100 SYRINGE at 10:51

## 2018-07-18 RX ADMIN — BORTEZOMIB 2.3 MG: 3.5 INJECTION, POWDER, LYOPHILIZED, FOR SOLUTION INTRAVENOUS; SUBCUTANEOUS at 10:47

## 2018-07-18 ASSESSMENT — PAIN SCALES - GENERAL
PAINLEVEL: MILD PAIN (3)
PAINLEVEL: MILD PAIN (2)

## 2018-07-18 NOTE — MR AVS SNAPSHOT
After Visit Summary   7/18/2018    Amira Arreola    MRN: [id number]           Patient Information     Date Of Birth          7/17/1932        Visit Information        Provider Department      7/18/2018 10:40 AM Shayne Roberts MD [facility]        Today's Diagnoses     Multiple myeloma not having achieved remission (H)          Care Instructions    3 month Zometa due 07/25/18.  Continue velcade and daratumumab.. Starting August, change velcade to every other week.  Follow up in 4 weeks.  Inform anti-coagulation nurse of patient's INR and ask them to call in a refill of warfarin.    The patient is in [facility]          Follow-ups after your visit        Your next 10 appointments already scheduled     Jul 25, 2018 10:00 AM CDT   Level 2 with  INFUSION CHAIR 19   [facility] ([facility])    [facility]  [address]  [place] [address]   [phone]            Aug 01, 2018  9:30 AM CDT   Level 2 with  INFUSION CHAIR 12   [facility] ([facility])    [facility]  [address]  [place] [address]   [phone]            Aug 15, 2018  9:00 AM CDT   Level 2 with  INFUSION CHAIR 20   [facility] ([facility])    [facility]  [address]  [place] [address]   [phone]            Aug 15, 2018  9:20 AM CDT   Return Visit with Shayne Roberts MD   [facility] ([facility])    [facility]  [address]  [place] [address]   [phone]            Aug 29, 2018  9:30 AM CDT   Level 2 with  INFUSION CHAIR 15   [facility] ([facility])    [facility]  [address]  [place] [address]   [phone]     "          Who to contact     If you have questions or need follow up information about today's clinic visit or your schedule please contact [facility] directly at [phone].  Normal or non-critical lab and imaging results will be communicated to you by MyChart, letter or phone within 4 business days after the clinic has received the results. If you do not hear from us within 7 days, please contact the clinic through MyChart or phone. If you have a critical or abnormal lab result, we will notify you by phone as soon as possible.  Submit refill requests through [employer] or call your pharmacy and they will forward the refill request to us. Please allow 3 business days for your refill to be completed.          Additional Information About Your Visit        Care EveryWhere ID     This is your Care EveryWhere ID. This could be used by other organizations to access your [place] medical records  [id number]        Your Vitals Were     Pulse Temperature Respirations Height Pulse Oximetry BMI (Body Mass Index)    81 98.3  F (36.8  C) (Oral) 16 1.644 m (5' 4.72\") 97% 24.5 kg/m2       Blood Pressure from Last 3 Encounters:   07/18/18 132/76   07/18/18 132/76   07/11/18 154/89    Weight from Last 3 Encounters:   07/18/18 66.2 kg (146 lb)   07/18/18 66.2 kg (146 lb)   07/11/18 65 kg (143 lb 6.4 oz)              Today, you had the following     No orders found for display         Where to get your medicines      These medications were sent to [id number] - [place], [place] - [address] AT [facility] [address]  [address], [place] [address]     Phone:  [phone]     dexamethasone 4 MG tablet         Some of these will need a paper prescription and others can be bought over the counter.  Ask your nurse if you have questions.     Bring a paper prescription for each of these medications     LORazepam 0.5 MG tablet    oxyCODONE IR 15 MG tablet         Information about OPIOIDS     " PRESCRIPTION OPIOIDS: WHAT YOU NEED TO KNOW   We gave you an opioid (narcotic) pain medicine. It is important to manage your pain, but opioids are not always the best choice. You should first try all the other options your care team gave you. Take this medicine for as short a time (and as few doses) as possible.     These medicines have risks:    DO NOT drive when on new or higher doses of pain medicine. These medicines can affect your alertness and reaction times, and you could be arrested for driving under the influence (DUI). If you need to use opioids long-term, talk to your care team about driving.    DO NOT operate heave machinery    DO NOT do any other dangerous activities while taking these medicines.     DO NOT drink any alcohol while taking these medicines.      If the opioid prescribed includes acetaminophen, DO NOT take with any other medicines that contain acetaminophen. Read all labels carefully. Look for the word  acetaminophen  or  Tylenol.  Ask your pharmacist if you have questions or are unsure.    You can get addicted to pain medicines, especially if you have a history of addiction (chemical, alcohol or substance dependence). Talk to your care team about ways to reduce this risk.    Store your pills in a secure place, locked if possible. We will not replace any lost or stolen medicine. If you don t finish your medicine, please throw away (dispose) as directed by your pharmacist. The [place] Pollution Control Agency has more information about safe disposal: https://www.pca.[facility].[place].us/living-green/managing-unwanted-medications.     All opioids tend to cause constipation. Drink plenty of water and eat foods that have a lot of fiber, such as fruits, vegetables, prune juice, apple juice and high-fiber cereal. Take a laxative (Miralax, milk of magnesia, Colace, Senna) if you don t move your bowels at least every other day.          Primary Care Provider Office Phone # Fax #    Addy rFias MD  [phone]       [address] [facility] [address]  [facility] [address]        Equal Access to Services     SARA ZELAYA : Hadii liane murcia nichol Galloway, wajanetda brenda, dahlia karandyda alonso, hung sadler. So [facility] [phone].    ATENCIÓN: Si habla español, tiene a barlow disposición servicios gratuitos de asistencia lingüística. Llame al [phone].    We comply with applicable federal civil rights laws and [place] laws. We do not discriminate on the basis of race, color, national origin, age, disability, sex, sexual orientation, or gender identity.            Thank you!     Thank you for choosing [facility]  for your care. Our goal is always to provide you with excellent care. Hearing back from our patients is one way we can continue to improve our services. Please take a few minutes to complete the written survey that you may receive in the mail after your visit with us. Thank you!             Your Updated Medication List - Protect others around you: Learn how to safely use, store and throw away your medicines at www.disposemymeds.org.          This list is accurate as of 7/18/18 10:57 AM.  Always use your most recent med list.                   Brand Name Dispense Instructions for use Diagnosis    ACYCLOVIR PO      Take 400 mg by mouth 2 times daily        CALCIUM CITRATE + PO      Take 2,000 mg by mouth daily 2 tabs        carboxymethylcellulose 0.5 % Soln ophthalmic solution    REFRESH PLUS     1 drop 4 times daily        CLARINEX PO      Take by mouth daily Taking claritin        COMPAZINE PO      Take 10 mg by mouth daily as needed        cycloSPORINE 0.05 % ophthalmic emulsion    RESTASIS     Place 1 drop into both eyes every 12 hours        DAILY MULTIVITAMIN PO      Take 1 tablet by mouth daily.    Routine general medical examination at a health care facility       dexamethasone 4 MG tablet    DECADRON    28 tablet    Take 20mg (5 tablets) by mouth every  week on the morning of velcade injection.    Multiple myeloma not having achieved remission (H)       erythromycin ophthalmic ointment    ROMYCIN     Place 1 Application into both eyes At Bedtime        * lidocaine-prilocaine cream    EMLA    30 g    Apply topically as needed for moderate pain Apply dollop size amount to port site 30-60 min prior to accessing    Multiple myeloma not having achieved remission (H)       * lidocaine-prilocaine cream    EMLA    30 g    Apply to port site 1 hour prior to access    Multiple myeloma not having achieved remission (H)       LORazepam 0.5 MG tablet    ATIVAN    30 tablet    Take 1 tablet (0.5 mg) by mouth every 8 hours as needed for anxiety    Multiple myeloma not having achieved remission (H)       metoprolol succinate 50 MG 24 hr tablet    TOPROL-XL    270 tablet    TAKE 2 TABLETS BY MOUTH EVERY MORNING, AND 1 TABLET EVERY EVENING    Paroxysmal atrial fibrillation (H)       oxyCODONE IR 15 MG tablet    ROXICODONE    90 tablet    Take 1 tablet (15 mg) by mouth every 8 hours as needed for pain maximum 4 tablet(s) per day    Multiple myeloma not having achieved remission (H)       polyethylene glycol powder    MIRALAX/GLYCOLAX     Take 1 capful by mouth daily as needed    Bilateral leg edema       REFRESH OP           SIMBRINZA 1-0.2 % ophthalmic suspension   Generic drug:  brinzolamide-brimonidine      Place 1 drop into the right eye 2 times daily 1 drop AM and PM        triamcinolone 0.5 % cream    KENALOG    15 g    Apply sparingly to affected area three times daily. 1-2 weeks , as needed    Rash and nonspecific skin eruption       TYLENOL PO      Take 500 mg by mouth every 6 hours as needed for mild pain or fever        UNABLE TO FIND      MEDICATION NAME: Fresh Coat eye drops        VITAMIN D3 PO      Take 1,000 Units by mouth daily        warfarin 4 MG tablet    COUMADIN    110 tablet    TAKE 1-2 TABLETS BY MOUTH EVERY DAY AS DIRECTED BY INR CLINIC    Paroxysmal atrial  fibrillation (H), Long-term (current) use of anticoagulants       ZOMETA IV      Inject into the vein every 30 days Every 3 month dosing        * Notice:  This list has 2 medication(s) that are the same as other medications prescribed for you. Read the directions carefully, and ask your doctor or other care provider to review them with you.

## 2018-07-18 NOTE — PATIENT INSTRUCTIONS
3 month Zometa due 07/25/18.  Scheduled/aida  Continue velcade and daratumumab.. Starting August, change velcade to every other week.  Scheduled/aida  Follow up in 4 weeks.   Scheduled/aida  Inform anti-coagulation nurse of patient's INR and ask them to call in a refill of warfarin.    The patient is in [facility] IT-       AVS printed & given to patient/aida

## 2018-07-18 NOTE — PROGRESS NOTES
ANTICOAGULATION FOLLOW-UP CLINIC VISIT    Patient Name:  Amira Arreola  Date:  7/18/2018  Contact Type:  Telephone    SUBJECTIVE:     Patient Findings     Positives No Problem Findings           OBJECTIVE    INR   Date Value Ref Range Status   07/18/2018 2.52 (H) 0.86 - 1.14 Final       ASSESSMENT / PLAN  INR assessment THER    Recheck INR In: 1 WEEK    INR Location Outside lab      Anticoagulation Summary as of 7/18/2018     INR goal 2.0-3.0   Today's INR 2.52   Warfarin maintenance plan 4 mg (4 mg x 1) every day   Full warfarin instructions 4 mg every day   Weekly warfarin total 28 mg   No change documented Michelle Pinon RN   Plan last modified Bri Mcbride RN (6/7/2018)   Next INR check 7/25/2018   Target end date Indefinite    Indications   Long-term (current) use of anticoagulants [Z79.01] [Z79.01]  Atrial fibrillation (H) [I48.91] (Resolved) [I48.91]         Anticoagulation Episode Summary     INR check location     Preferred lab     Send INR reminders to CS ANTICOAGULATION    Comments patient have standing INR order to be draw at infusion visit.  advise to call [facility] when have INR draw for dosing instruction.  patient can be reach at home phone [phone]      Anticoagulation Care Providers     Provider Role Specialty Phone number    Addy Frias MD [facility] Internal Medicine [phone]            See the Encounter Report to view Anticoagulation Flowsheet and Dosing Calendar (Go to Encounters tab in chart review, and find the Anticoagulation Therapy Visit)    Dosage adjustment made based on physician directed care plan.  Left VM for patient with instructions - continue 4mg daily  If missed doses, diet changes, s/s of bleeding or clot, etc call the clinic    Michelle Pinon RN

## 2018-07-18 NOTE — PROGRESS NOTES
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     1. On 09/21/2015, WBC of 4.2, hemoglobin of 13.2 and platelets of 138.    -On 09/29/2015, SPEP does not reveal any M-spike.   -On 10/02/2015, JANET does not reveal any monoclonal protein.     -On 10/22/2015, urine immunofixation reveals monoclonal free kappa light chain.    2. On 05/11/2016, kappa light chain of 50, lambda light chain of 0.32 and ratio of kappa to lambda of 156.2.  3. Bone marrow biopsy on 05/25/2016 reveals 40-50% kappa light chain restricted plasma cells.  Cytogenetics is normal. FISH panel reveals translocation 11;14.    4. MRI of bones on 06/21/2016 and 06/22/2016 reveals myeloma lesions.  5. On 08/24/2016, she was started on revlimid with dexamethasone 20 mg weekly. She did not have any significant response to treatment.   6. Velcade and dexamethasone started on 03/21/2017.    7. Daratumumab added on 05/31/2017.      SUBJECTIVE:    Mrs. Arreola is an 86-year-old female with kappa free light chain multiple myeloma diagnosed in 2016.  She is currently on daratumumab, Velcade, and dexamethasone.  She has been tolerating it well.  Disease has been responding.      About 2 weeks ago, she fell down at home.  She hit the left side of her chest on a chair.  Because of that she has some discomfort.      REVIEW OF SYSTEMS:    No headache.  No dizziness.  No visual problem.  No neck pain.  No difficulty breathing.  Discomfort in the left side of the chest is more when she takes a deep breath.  No abdominal pain, nausea, or vomiting.  Appetite is fairly good.  No urinary or bowel complaints.  No bleeding.  She has fatigue.  No worsening of it.      Discussed regarding neuropathy.  She gets intermittent numbness and tingling.  It does not bother her.      PHYSICAL EXAMINATION:   Alert and oriented x 3.   EYES:  No icterus.   THROAT:  No ulcer or thrush.   NECK:  Supple. No  lymphadenopathy.   AXILLAE:  No lymphadenopathy.   CHEST:  There was no deformity.  No skin changes.  On palpation, mild tenderness in the left lower posterior chest.   LUNGS:  Good air entry bilaterally.  No crackles or wheezing.   HEART:  Regular.  No murmur.   ABDOMEN:  Soft and nontender.  No mass.   EXTREMITIES: Bilateral pedal edema. No calf swelling or tenderness.   SKIN: No petechia.      LABORATORY DATA:  Reviewed.      ASSESSMENT:   1.  86-year-old female with kappa free light chain multiple myeloma.   2.  Peripheral neuropathy.   3.  Left-sided chest pain secondary to contusion from a fall.   4.  Chronic back pain.   5.  Mild anemia which is stable.      PLAN:   1.  I discussed with her regarding multiple myeloma. Patient overall is stable.  Her kappa free light chain remains low.  It used to be 60.  Now it is remaining below 2.     She is on Velcade, daratumumab, and dexamethasone.  We will continue on it.  Discussed regarding changing Velcade to every other week.  That will help to reduce neuropathy.  Also, little more convenient.  The patient agreeable for it.  She will get Velcade every other week starting August.  She will get daratumumab once a month.  Dexamethasone once a week will be continued.  Side effects reviewed.   2.  She has mild peripheral neuropathy.  I told her that with continuing Velcade it can get worse.  Will monitor it closely.   3.  She had chest contusion.  She will take over-the-counter pain medication as needed.   4.  Patient had a few questions, which were all answered.  I will see her in a month.  She gets Zometa every 3 months which will be continued.  So far, she does not have any dental or jaw related symptoms.  She is on chronic anticoagulation.  Her INR is therapeutic.  She will get warfarin refill through the Anticoagulation Clinic.         ITZ LOPEZ MD             D: 07/18/2018   T: 07/18/2018   MT: ROXANA      Name:     ALBERTO PARSON   MRN:      [id number]         Account:      [id number]   :      1932           Visit Date:   2018      Document: [id number]

## 2018-07-18 NOTE — PROGRESS NOTES
"Oncology Rooming Note    July 18, 2018 9:40 AM   Amira Arreola is a 86 year old female who presents for:    Chief Complaint   Patient presents with     Oncology Clinic Visit     Multiple myeloma not having achieved remission      Initial Vitals: /76  Pulse 81  Temp 98.3  F (36.8  C) (Oral)  Resp 16  Ht 1.644 m (5' 4.72\")  Wt 66.2 kg (146 lb)  SpO2 97%  BMI 24.5 kg/m2 Estimated body mass index is 24.5 kg/(m^2) as calculated from the following:    Height as of this encounter: 1.644 m (5' 4.72\").    Weight as of this encounter: 66.2 kg (146 lb). Body surface area is 1.74 meters squared.  Data Unavailable Comment: Data Unavailable   No LMP recorded. Patient is postmenopausal.  Allergies reviewed: Yes  Medications reviewed: Yes    Medications: MEDICATION REFILL NEEDED ON DECADRON,ATIVAN,OXYCODONE AND COUMADIN  Pharmacy name entered into [facility]: [facility] [place] [id number] [place] [address] 7 AT [facility] [address]    Clinical concerns: None                      4 minutes for nursing intake (face to face time)   Zora Bentley MA            "

## 2018-07-18 NOTE — MR AVS SNAPSHOT
After Visit Summary   7/18/2018    Amira Arreola    MRN: [id number]           Patient Information     Date Of Birth          7/17/1932        Visit Information        Provider Department      7/18/2018 9:30 AM  INFUSION CHAIR 15 [facility]        Today's Diagnoses     Multiple myeloma not having achieved remission (H)    -  1    Paroxysmal atrial fibrillation (H)        Portacath in place           Follow-ups after your visit        Your next 10 appointments already scheduled     Jul 25, 2018 10:00 AM CDT   Level 2 with  INFUSION CHAIR 19   [facility] ([facility])    [facility]  [address]  [place] [address]   [phone]            Aug 01, 2018  9:30 AM CDT   Level 2 with  INFUSION CHAIR 12   [facility] ([facility])    [facility]  [address]  [place] [address]   [phone]            Aug 15, 2018  9:00 AM CDT   Level 2 with  INFUSION CHAIR 20   [facility] ([facility])    [facility]  [address]  [place] [address]   [phone]            Aug 15, 2018  9:20 AM CDT   Return Visit with Shayne Roberts MD   [facility]    [facility]  [address]  [place] [address] [phone]              Who to contact     If you have questions or need follow up information about today's clinic visit or your schedule please contact [facility] directly at [phone].  Normal or non-critical lab and imaging results will be communicated to you by MyChart, letter or phone within 4 business days after the clinic has received the results. If you do not hear from us within 7 days, please contact the clinic  "through atokorehart or phone. If you have a critical or abnormal lab result, we will notify you by phone as soon as possible.  Submit refill requests through [employer] or call your pharmacy and they will forward the refill request to us. Please allow 3 business days for your refill to be completed.          Additional Information About Your Visit        Care EveryWhere ID     This is your Care EveryWhere ID. This could be used by other organizations to access your [place] medical records  [id number]        Your Vitals Were     Pulse Temperature Respirations Height Pulse Oximetry BMI (Body Mass Index)    81 98.3  F (36.8  C) (Oral) 16 1.644 m (5' 4.72\") 97% 24.5 kg/m2       Blood Pressure from Last 3 Encounters:   07/18/18 132/76   07/18/18 132/76   07/11/18 154/89    Weight from Last 3 Encounters:   07/18/18 66.2 kg (146 lb)   07/18/18 66.2 kg (146 lb)   07/11/18 65 kg (143 lb 6.4 oz)              We Performed the Following     CBC with platelets differential     INR          Where to get your medicines      These medications were sent to [id number] - [place], [place] - [address] AT [facility] [address]  [address], [place] [address]     Phone:  [phone]     dexamethasone 4 MG tablet         Some of these will need a paper prescription and others can be bought over the counter.  Ask your nurse if you have questions.     Bring a paper prescription for each of these medications     LORazepam 0.5 MG tablet    oxyCODONE IR 15 MG tablet          Primary Care Provider Office Phone # Fax #    Addy Frias [phone] [address] [facility] [address]  [facility] [address]        Equal Access to Services     SARA ZELAYA AH: Gissel Galloway, rhonda gutierrez, dahlia kaalmakria gupta, hung sadler. So [facility] [phone].    ATENCIÓN: Si habla español, tiene a barlow disposición servicios gratuitos de asistencia lingüística. Llame al " [phone].    We comply with applicable federal civil rights laws and [place] laws. We do not discriminate on the basis of race, color, national origin, age, disability, sex, sexual orientation, or gender identity.            Thank you!     Thank you for choosing [facility] AND [facility]  for your care. Our goal is always to provide you with excellent care. Hearing back from our patients is one way we can continue to improve our services. Please take a few minutes to complete the written survey that you may receive in the mail after your visit with us. Thank you!             Your Updated Medication List - Protect others around you: Learn how to safely use, store and throw away your medicines at www.disposemymeds.org.          This list is accurate as of 7/18/18 10:56 AM.  Always use your most recent med list.                   Brand Name Dispense Instructions for use Diagnosis    ACYCLOVIR PO      Take 400 mg by mouth 2 times daily        CALCIUM CITRATE + PO      Take 2,000 mg by mouth daily 2 tabs        carboxymethylcellulose 0.5 % Soln ophthalmic solution    REFRESH PLUS     1 drop 4 times daily        CLARINEX PO      Take by mouth daily Taking claritin        COMPAZINE PO      Take 10 mg by mouth daily as needed        cycloSPORINE 0.05 % ophthalmic emulsion    RESTASIS     Place 1 drop into both eyes every 12 hours        DAILY MULTIVITAMIN PO      Take 1 tablet by mouth daily.    Routine general medical examination at a health care facility       dexamethasone 4 MG tablet    DECADRON    28 tablet    Take 20mg (5 tablets) by mouth every week on the morning of velcade injection.    Multiple myeloma not having achieved remission (H)       erythromycin ophthalmic ointment    ROMYCIN     Place 1 Application into both eyes At Bedtime        * lidocaine-prilocaine cream    EMLA    30 g    Apply topically as needed for moderate pain Apply dollop size amount to port site 30-60 min prior to  accessing    Multiple myeloma not having achieved remission (H)       * lidocaine-prilocaine cream    EMLA    30 g    Apply to port site 1 hour prior to access    Multiple myeloma not having achieved remission (H)       LORazepam 0.5 MG tablet    ATIVAN    30 tablet    Take 1 tablet (0.5 mg) by mouth every 8 hours as needed for anxiety    Multiple myeloma not having achieved remission (H)       metoprolol succinate 50 MG 24 hr tablet    TOPROL-XL    270 tablet    TAKE 2 TABLETS BY MOUTH EVERY MORNING, AND 1 TABLET EVERY EVENING    Paroxysmal atrial fibrillation (H)       oxyCODONE IR 15 MG tablet    ROXICODONE    90 tablet    Take 1 tablet (15 mg) by mouth every 8 hours as needed for pain maximum 4 tablet(s) per day    Multiple myeloma not having achieved remission (H)       polyethylene glycol powder    MIRALAX/GLYCOLAX     Take 1 capful by mouth daily as needed    Bilateral leg edema       REFRESH OP           SIMBRINZA 1-0.2 % ophthalmic suspension   Generic drug:  brinzolamide-brimonidine      Place 1 drop into the right eye 2 times daily 1 drop AM and PM        triamcinolone 0.5 % cream    KENALOG    15 g    Apply sparingly to affected area three times daily. 1-2 weeks , as needed    Rash and nonspecific skin eruption       TYLENOL PO      Take 500 mg by mouth every 6 hours as needed for mild pain or fever        UNABLE TO FIND      MEDICATION NAME: Fresh Coat eye drops        VITAMIN D3 PO      Take 1,000 Units by mouth daily        warfarin 4 MG tablet    COUMADIN    110 tablet    TAKE 1-2 TABLETS BY MOUTH EVERY DAY AS DIRECTED BY INR CLINIC    Paroxysmal atrial fibrillation (H), Long-term (current) use of anticoagulants       ZOMETA IV      Inject into the vein every 30 days Every 3 month dosing        * Notice:  This list has 2 medication(s) that are the same as other medications prescribed for you. Read the directions carefully, and ask your doctor or other care provider to review them with you.

## 2018-07-18 NOTE — Clinical Note
"    7/18/2018         RE: Amira Arreola  [address]  [place] [address]        Dear Colleague,    Thank you for referring your patient, Amira Arreola, to the [facility]. Please see a copy of my visit note below.    Oncology Rooming Note    July 18, 2018 9:40 AM   Amira Arreola is a 86 year old female who presents for:    Chief Complaint   Patient presents with     Oncology Clinic Visit     Multiple myeloma not having achieved remission      Initial Vitals: /76  Pulse 81  Temp 98.3  F (36.8  C) (Oral)  Resp 16  Ht 1.644 m (5' 4.72\")  Wt 66.2 kg (146 lb)  SpO2 97%  BMI 24.5 kg/m2 Estimated body mass index is 24.5 kg/(m^2) as calculated from the following:    Height as of this encounter: 1.644 m (5' 4.72\").    Weight as of this encounter: 66.2 kg (146 lb). Body surface area is 1.74 meters squared.  Data Unavailable Comment: Data Unavailable   No LMP recorded. Patient is postmenopausal.  Allergies reviewed: Yes  Medications reviewed: Yes    Medications: MEDICATION REFILL NEEDED ON DECADRON,ATIVAN,OXYCODONE AND COUMADIN  Pharmacy name entered into [facility]: [facility] [id number] [place] [address], [place] - [address] [place] [facility] [place] [address]    Clinical concerns: None                      4 minutes for nursing intake (face to face time)   Zora Bentley MA              Visit Date:   07/18/2018      SUBJECTIVE:  Ms. Arreola is an 86-year-old female with kappa free light chain multiple myeloma diagnosed in 2016.  She is currently on daratumumab, Velcade, and dexamethasone.  She has been tolerating it well.  Disease has been responding.      About 2 weeks ago she fell down at home.  She hit the left side of her chest on a chair.  Because of that she has some discomfort.      REVIEW OF SYSTEMS:  No headache.  No dizziness.  No visual problem.  No neck pain.  No difficulty breathing.  Discomfort in the left side of the chest is more when she takes a deep breath.  No abdominal " pain, nausea, or vomiting.  Appetite fairly good.  No urinary or bowel complaints.  No bleeding.  She has fatigue.  No worsening of it.      Discussed regarding neuropathy.  She gets intermittent numbness and tingling.  It does not bother her.      PHYSICAL EXAMINATION:  Unchanged.   CHEST:  There was no deformity.  No skin changes.  On palpation, mild tenderness in the left lower posterior chest.      LABORATORY DATA:  Reviewed.      ASSESSMENT: 86-year-old female with:    1.  [place] free light chain multiple myeloma.   2.  Peripheral neuropathy.   3.  Left-sided chest pain secondary to contusion from a fall.   4.  Chronic back pain.   5.  Mild anemia which is stable.      PLAN:   1.  I discussed with her regarding multiple myeloma.  The patient overall is stable.  Her kappa free light chain remains low.  It used to be 60.  Now it is remaining below 2.        She is on Velcade, daratumumab, and dexamethasone.  We will continue on it.  Discussed regarding changing Velcade to every other week.  That will help to reduce neuropathy.  Also, little more convenient.  The patient agreeable for it.  She will get Velcade every other week starting August.  She will get daratumumab once a month.  Dexamethasone once a week will be continued.  Side effects reviewed.   2.  She has mild peripheral neuropathy.  I told her that with continuing Velcade it can get worse.  Will monitor it closely.   3.  She had chest contusion.  She will take over-the-counter pain medication as needed.   4.  The patient had a few questions, which were all answered.  I will see her in a month.  She gets Zometa every 3 months which will be continued.  So far, she does not have any dental or jaw related symptoms.  She is on chronic anticoagulation.  Her INR is therapeutic.  She will get warfarin refill through the Anticoagulation Clinic.         ITZ LOPEZ MD             D: 07/18/2018   T: 07/18/2018   MT: ROXANA      Name:     TIM PARSONN   MRN:       [id number]        Account:      [id number]   :      1932           Visit Date:   2018      Document: [id number]       Again, thank you for allowing me to participate in the care of your patient.        Sincerely,        Shayne Roberts MD

## 2018-07-25 ENCOUNTER — INFUSION THERAPY VISIT (OUTPATIENT)
Dept: INFUSION THERAPY | Facility: CLINIC | Age: 83
End: 2018-07-25
Attending: INTERNAL MEDICINE
Payer: MEDICARE

## 2018-07-25 ENCOUNTER — ANTICOAGULATION THERAPY VISIT (OUTPATIENT)
Dept: FAMILY MEDICINE | Facility: CLINIC | Age: 83
End: 2018-07-25
Payer: COMMERCIAL

## 2018-07-25 ENCOUNTER — TELEPHONE (OUTPATIENT)
Dept: FAMILY MEDICINE | Facility: CLINIC | Age: 83
End: 2018-07-25

## 2018-07-25 ENCOUNTER — HOSPITAL ENCOUNTER (OUTPATIENT)
Facility: CLINIC | Age: 83
Setting detail: SPECIMEN
Discharge: HOME | End: 2018-07-25
Attending: INTERNAL MEDICINE | Admitting: INTERNAL MEDICINE
Payer: MEDICARE

## 2018-07-25 VITALS
DIASTOLIC BLOOD PRESSURE: 67 MMHG | OXYGEN SATURATION: 98 % | BODY MASS INDEX: 24.6 KG/M2 | WEIGHT: 146.6 LBS | TEMPERATURE: 98.2 F | SYSTOLIC BLOOD PRESSURE: 130 MMHG | HEART RATE: 72 BPM | RESPIRATION RATE: 20 BRPM

## 2018-07-25 DIAGNOSIS — Z95.828 PORTACATH IN PLACE: ICD-10-CM

## 2018-07-25 DIAGNOSIS — I48.0 PAROXYSMAL ATRIAL FIBRILLATION (H): ICD-10-CM

## 2018-07-25 DIAGNOSIS — C90.00 MULTIPLE MYELOMA NOT HAVING ACHIEVED REMISSION (H): Primary | ICD-10-CM

## 2018-07-25 DIAGNOSIS — C79.51 CANCER, METASTATIC TO BONE (H): ICD-10-CM

## 2018-07-25 LAB
ALBUMIN SERPL-MCNC: 3.5 G/DL (ref 3.4–5)
BASOPHILS # BLD AUTO: 0 10E9/L (ref 0–0.2)
BASOPHILS NFR BLD AUTO: 0.1 %
CALCIUM SERPL-MCNC: 10 MG/DL (ref 8.5–10.1)
CREAT SERPL-MCNC: 0.71 MG/DL (ref 0.52–1.04)
DIFFERENTIAL METHOD BLD: ABNORMAL
EOSINOPHIL # BLD AUTO: 0 10E9/L (ref 0–0.7)
EOSINOPHIL NFR BLD AUTO: 0.3 %
ERYTHROCYTE [DISTWIDTH] IN BLOOD BY AUTOMATED COUNT: 14.3 % (ref 10–15)
GFR SERPL CREATININE-BSD FRML MDRD: 78 ML/MIN/1.7M2
HCT VFR BLD AUTO: 35.2 % (ref 35–47)
HGB BLD-MCNC: 11.7 G/DL (ref 11.7–15.7)
IMM GRANULOCYTES # BLD: 0 10E9/L (ref 0–0.4)
IMM GRANULOCYTES NFR BLD: 0.3 %
INR PPP: 2.43 (ref 0.86–1.14)
LYMPHOCYTES # BLD AUTO: 0.4 10E9/L (ref 0.8–5.3)
LYMPHOCYTES NFR BLD AUTO: 4.9 %
MCH RBC QN AUTO: 32.5 PG (ref 26.5–33)
MCHC RBC AUTO-ENTMCNC: 33.2 G/DL (ref 31.5–36.5)
MCV RBC AUTO: 98 FL (ref 78–100)
MONOCYTES # BLD AUTO: 0.2 10E9/L (ref 0–1.3)
MONOCYTES NFR BLD AUTO: 2.8 %
NEUTROPHILS # BLD AUTO: 6.9 10E9/L (ref 1.6–8.3)
NEUTROPHILS NFR BLD AUTO: 91.6 %
NRBC # BLD AUTO: 0 10*3/UL
NRBC BLD AUTO-RTO: 0 /100
PLATELET # BLD AUTO: 135 10E9/L (ref 150–450)
RBC # BLD AUTO: 3.6 10E12/L (ref 3.8–5.2)
WBC # BLD AUTO: 7.6 10E9/L (ref 4–11)

## 2018-07-25 PROCEDURE — 96374 THER/PROPH/DIAG INJ IV PUSH: CPT

## 2018-07-25 PROCEDURE — 82040 ASSAY OF SERUM ALBUMIN: CPT | Performed by: INTERNAL MEDICINE

## 2018-07-25 PROCEDURE — 25000128 H RX IP 250 OP 636: Performed by: INTERNAL MEDICINE

## 2018-07-25 PROCEDURE — 99207 ZZC NO CHARGE NURSE ONLY: CPT | Performed by: INTERNAL MEDICINE

## 2018-07-25 PROCEDURE — 25000128 H RX IP 250 OP 636: Performed by: NURSE PRACTITIONER

## 2018-07-25 PROCEDURE — 85025 COMPLETE CBC W/AUTO DIFF WBC: CPT | Performed by: INTERNAL MEDICINE

## 2018-07-25 PROCEDURE — 85610 PROTHROMBIN TIME: CPT | Performed by: INTERNAL MEDICINE

## 2018-07-25 PROCEDURE — 82565 ASSAY OF CREATININE: CPT | Performed by: INTERNAL MEDICINE

## 2018-07-25 PROCEDURE — 82310 ASSAY OF CALCIUM: CPT | Performed by: INTERNAL MEDICINE

## 2018-07-25 PROCEDURE — 96401 CHEMO ANTI-NEOPL SQ/IM: CPT

## 2018-07-25 RX ORDER — ZOLEDRONIC ACID 0.04 MG/ML
4 INJECTION, SOLUTION INTRAVENOUS ONCE
Status: COMPLETED | OUTPATIENT
Start: 2018-07-25 | End: 2018-07-25

## 2018-07-25 RX ORDER — HEPARIN SODIUM (PORCINE) LOCK FLUSH IV SOLN 100 UNIT/ML 100 UNIT/ML
500 SOLUTION INTRAVENOUS EVERY 8 HOURS
Status: DISCONTINUED | OUTPATIENT
Start: 2018-07-25 | End: 2018-07-25 | Stop reason: HOSPADM

## 2018-07-25 RX ORDER — HEPARIN SODIUM (PORCINE) LOCK FLUSH IV SOLN 100 UNIT/ML 100 UNIT/ML
500 SOLUTION INTRAVENOUS EVERY 8 HOURS
Status: CANCELLED
Start: 2018-07-25

## 2018-07-25 RX ADMIN — ZOLEDRONIC ACID 4 MG: 0.04 INJECTION, SOLUTION INTRAVENOUS at 10:36

## 2018-07-25 RX ADMIN — HEPARIN 500 UNITS: 100 SYRINGE at 11:02

## 2018-07-25 RX ADMIN — SODIUM CHLORIDE 250 ML: 9 INJECTION, SOLUTION INTRAVENOUS at 10:37

## 2018-07-25 RX ADMIN — BORTEZOMIB 2.3 MG: 3.5 INJECTION, POWDER, LYOPHILIZED, FOR SOLUTION INTRAVENOUS; SUBCUTANEOUS at 10:23

## 2018-07-25 ASSESSMENT — PAIN SCALES - GENERAL: PAINLEVEL: MILD PAIN (2)

## 2018-07-25 NOTE — TELEPHONE ENCOUNTER
Per result note from PCP, patient's INR 2.43 at infusion center today. She is consistently in therapeutic range.  Unable to leave msg on home phone no.  Will need to recall.  Patient should continue 4 mg Warfarin daily and recheck in 2 weeks. (however, she will likely check next week at infusion center. That is fine)    Leave this encounter open.  ACC encounter is completed and closed.  Tigist Corral RN

## 2018-07-25 NOTE — MR AVS SNAPSHOT
After Visit Summary   7/25/2018    Amira Arreola    MRN: [id number]           Patient Information     Date Of Birth          7/17/1932        Visit Information        Provider Department      7/25/2018 10:00 AM  INFUSION CHAIR 19 [facility]        Today's Diagnoses     Multiple myeloma not having achieved remission (H)    -  1    Cancer, metastatic to bone (H)        Paroxysmal atrial fibrillation (H)        Portacath in place           Follow-ups after your visit        Your next 10 appointments already scheduled     Aug 01, 2018  9:30 AM CDT   Level 2 with  INFUSION CHAIR 12   [facility] ([facility])    [facility]  [address]  [place] [address]   [phone]            Aug 15, 2018  9:00 AM CDT   Level 2 with  INFUSION CHAIR 20   [facility] ([facility])    [facility]  [address]  [place] [address] [phone]            Aug 15, 2018  9:20 AM CDT   Return Visit with Shayne Roberts MD   [facility] ([facility])    [facility]  [address]  [place] [address]   [phone]            Aug 29, 2018  9:30 AM CDT   Level 2 with  INFUSION CHAIR 15   [facility] ([facility])    [facility]  [address]  [place] [address] [phone]              Who to contact     If you have questions or need follow up information about today's clinic visit or your schedule please contact [facility] directly at [phone].  Normal or non-critical lab and imaging results will be communicated to you by MyChart, letter or phone within 4 business days after the clinic has received the results. If you do not hear from us within  7 days, please contact the clinic through [employer] or phone. If you have a critical or abnormal lab result, we will notify you by phone as soon as possible.  Submit refill requests through [employer] or call your pharmacy and they will forward the refill request to us. Please allow 3 business days for your refill to be completed.          Additional Information About Your Visit        Care EveryWhere ID     This is your Care EveryWhere ID. This could be used by other organizations to access your [facility] medical records  [id number]        Your Vitals Were     Pulse Temperature Respirations Pulse Oximetry BMI (Body Mass Index)       72 98.2  F (36.8  C) (Oral) 20 98% 24.6 kg/m2        Blood Pressure from Last 3 Encounters:   07/25/18 130/67   07/18/18 132/76   07/18/18 132/76    Weight from Last 3 Encounters:   07/25/18 66.5 kg (146 lb 9.6 oz)   07/18/18 66.2 kg (146 lb)   07/18/18 66.2 kg (146 lb)              We Performed the Following     Albumin level     Calcium     CBC with platelets differential     Creatinine     INR        Primary Care Provider Office Phone # Fax #    Addy Sean Frias [phone] [address] [facility] [address]  [place] [address]        Equal Access to Services     HARINI ZELAYA : Hadii aad ku hadasho Soomaali, waaxda luqadaha, qaybta kaalmada adeegyada, waxay lópez dominique . So [facility] [phone].    ATENCIÓN: Si habla español, tiene a barlow disposición servicios gratuitos de asistencia lingüística. ame al [phone].    We comply with applicable federal civil rights laws and [place] laws. We do not discriminate on the basis of race, color, national origin, age, disability, sex, sexual orientation, or gender identity.            Thank you!     Thank you for choosing [facility]  for your care. Our goal is always to provide you with excellent care. Hearing back from our patients is one way we can continue to improve our  services. Please take a few minutes to complete the written survey that you may receive in the mail after your visit with us. Thank you!             Your Updated Medication List - Protect others around you: Learn how to safely use, store and throw away your medicines at www.disposemymeds.org.          This list is accurate as of 7/25/18 11:07 AM.  Always use your most recent med list.                   Brand Name Dispense Instructions for use Diagnosis    ACYCLOVIR PO      Take 400 mg by mouth 2 times daily        CALCIUM CITRATE + PO      Take 2,000 mg by mouth daily 2 tabs        carboxymethylcellulose 0.5 % Soln ophthalmic solution    REFRESH PLUS     1 drop 4 times daily        CLARINEX PO      Take by mouth daily Taking claritin        COMPAZINE PO      Take 10 mg by mouth daily as needed        cycloSPORINE 0.05 % ophthalmic emulsion    RESTASIS     Place 1 drop into both eyes every 12 hours        DAILY MULTIVITAMIN PO      Take 1 tablet by mouth daily.    Routine general medical examination at a health care facility       dexamethasone 4 MG tablet    DECADRON    28 tablet    Take 20mg (5 tablets) by mouth every week on the morning of velcade injection.    Multiple myeloma not having achieved remission (H)       erythromycin ophthalmic ointment    ROMYCIN     Place 1 Application into both eyes At Bedtime        * lidocaine-prilocaine cream    EMLA    30 g    Apply topically as needed for moderate pain Apply dollop size amount to port site 30-60 min prior to accessing    Multiple myeloma not having achieved remission (H)       * lidocaine-prilocaine cream    EMLA    30 g    Apply to port site 1 hour prior to access    Multiple myeloma not having achieved remission (H)       LORazepam 0.5 MG tablet    ATIVAN    30 tablet    Take 1 tablet (0.5 mg) by mouth every 8 hours as needed for anxiety    Multiple myeloma not having achieved remission (H)       metoprolol succinate 50 MG 24 hr tablet    TOPROL-XL    270  tablet    TAKE 2 TABLETS BY MOUTH EVERY MORNING, AND 1 TABLET EVERY EVENING    Paroxysmal atrial fibrillation (H)       oxyCODONE IR 15 MG tablet    ROXICODONE    90 tablet    Take 1 tablet (15 mg) by mouth every 8 hours as needed for pain maximum 4 tablet(s) per day    Multiple myeloma not having achieved remission (H)       polyethylene glycol powder    MIRALAX/GLYCOLAX     Take 1 capful by mouth daily as needed    Bilateral leg edema       REFRESH OP           SIMBRINZA 1-0.2 % ophthalmic suspension   Generic drug:  brinzolamide-brimonidine      Place 1 drop into the right eye 2 times daily 1 drop AM and PM        triamcinolone 0.5 % cream    KENALOG    15 g    Apply sparingly to affected area three times daily. 1-2 weeks , as needed    Rash and nonspecific skin eruption       TYLENOL PO      Take 500 mg by mouth every 6 hours as needed for mild pain or fever        UNABLE TO FIND      MEDICATION NAME: Fresh Coat eye drops        VITAMIN D3 PO      Take 1,000 Units by mouth daily        warfarin 4 MG tablet    COUMADIN    110 tablet    TAKE 1-2 TABLETS BY MOUTH EVERY DAY AS DIRECTED BY INR CLINIC    Paroxysmal atrial fibrillation (H), Long-term (current) use of anticoagulants       ZOMETA IV      Inject into the vein every 30 days Every 3 month dosing        * Notice:  This list has 2 medication(s) that are the same as other medications prescribed for you. Read the directions carefully, and ask your doctor or other care provider to review them with you.

## 2018-07-25 NOTE — PROGRESS NOTES
"  ANTICOAGULATION FOLLOW-UP CLINIC VISIT    Patient Name:  Amira Arreola  Date:  7/25/2018  Contact Type:  Telephone    SUBJECTIVE:     Patient Findings     Positives No Problem Findings           OBJECTIVE    INR   Date Value Ref Range Status   07/25/2018 2.43 (H) 0.86 - 1.14 Final       ASSESSMENT / PLAN  INR assessment THER    Recheck INR In: 2 WEEKS    INR Location Outside lab      Anticoagulation Summary as of 7/25/2018     INR goal 2.0-3.0   Today's INR 2.43   Warfarin maintenance plan 4 mg (4 mg x 1) every day   Full warfarin instructions 4 mg every day   Weekly warfarin total 28 mg   No change documented Tigist Corral RN   Plan last modified Bri Mcbride RN (6/7/2018)   Next INR check 8/8/2018   Target end date Indefinite    Indications   Long-term (current) use of anticoagulants [Z79.01] [Z79.01]  Atrial fibrillation (H) [I48.91] (Resolved) [I48.91]         Anticoagulation Episode Summary     INR check location     Preferred lab     Send INR reminders to [facility] ANTICOAGULATION    Comments patient have standing INR order to be draw at infusion visit.  advise to call [facility] when have INR draw for dosing instruction.  patient can be reach at home phone [phone]      Anticoagulation Care Providers     Provider Role Specialty Phone number    Addy Frias MD [facility] [phone]            See the Encounter Report to view Anticoagulation Flowsheet and Dosing Calendar (Go to Encounters tab in chart review, and find the Anticoagulation Therapy Visit)    Dosage adjustment made based on physician directed care plan.  Result note in Epic.  INR 2.43 today at infusion center.  Called patient; unable to leave message (\"please enter access code\").  Will recall until reaching patient.  See telephone encounter which has dosing instructions. Will close this encounter.    Tigist Corral RN               "

## 2018-07-25 NOTE — PROGRESS NOTES
Infusion Nursing Note:  Amira Arreola presents today for Cycle 15 Day 22 Velcade, Zometa.    Patient seen by provider today: No   present during visit today: Not Applicable.    Note: N/A.    Intravenous Access:  Implanted Port.    Treatment Conditions:  Lab Results   Component Value Date    HGB 11.7 07/25/2018     Lab Results   Component Value Date    WBC 7.6 07/25/2018      Lab Results   Component Value Date    ANEU 6.9 07/25/2018     Lab Results   Component Value Date     07/25/2018      Results reviewed, labs MET treatment parameters, ok to proceed with treatment.      Post Infusion Assessment:  Patient tolerated infusion without incident.  Patient tolerated injection without incident.  Blood return noted pre and post infusion.  Site patent and intact, free from redness, edema or discomfort.  No evidence of extravasations.  Access discontinued per protocol.    Discharge Plan:   Patient declined prescription refills.  Discharge instructions reviewed with: Patient.  Patient verbalized understanding of discharge instructions and all questions answered.  Copy of AVS reviewed with patient.  Patient will return 8/1/18 for next appointment.  Patient discharged in stable condition accompanied by: self.  Departure Mode: Ambulatory.    Senia Juan RN

## 2018-07-25 NOTE — MR AVS SNAPSHOT
Amira IVY Arreola   7/25/2018   Anticoagulation Therapy Visit    Description:  86 year old female   Provider:  Addy Frias MD   Department:  [facility]           INR as of 7/25/2018     Today's INR 2.43      Anticoagulation Summary as of 7/25/2018     INR goal 2.0-3.0   Today's INR 2.43   Full warfarin instructions 4 mg every day   Next INR check 8/8/2018    Indications   Long-term (current) use of anticoagulants [Z79.01] [Z79.01]  Atrial fibrillation (H) [I48.91] (Resolved) [I48.91]         July 2018 Details    Sun Mon Tue Wed Thu Fri Sat     1               2               3               4               5               6               7                 8               9               10               11               12               13               14                 15               16               17               18               19               20               21                 22               23               24               25      4 mg   See details      26      4 mg         27      4 mg         28      4 mg           29      4 mg         30      4 mg         31      4 mg              Date Details   07/25 This INR check               How to take your warfarin dose     To take:  4 mg Take 1 of the 4 mg tablets.           August 2018 Details    Sun Mon Tue Wed Thu Fri Sat        1      4 mg         2      4 mg         3      4 mg         4      4 mg           5      4 mg         6      4 mg         7      4 mg         8            9               10               11                 12               13               14               15               16               17               18                 19               20               21               22               23               24               25                 26               27               28               29               30               31                 Date Details   No additional details    Date of next INR:   8/8/2018         How to take your warfarin dose     To take:  4 mg Take 1 of the 4 mg tablets.

## 2018-08-01 ENCOUNTER — HOSPITAL ENCOUNTER (OUTPATIENT)
Facility: CLINIC | Age: 83
Setting detail: SPECIMEN
End: 2018-08-01
Attending: INTERNAL MEDICINE
Payer: MEDICARE

## 2018-08-01 ENCOUNTER — INFUSION THERAPY VISIT (OUTPATIENT)
Dept: INFUSION THERAPY | Facility: CLINIC | Age: 83
End: 2018-08-01
Attending: INTERNAL MEDICINE
Payer: MEDICARE

## 2018-08-01 ENCOUNTER — HOSPITAL ENCOUNTER (OUTPATIENT)
Facility: CLINIC | Age: 83
Setting detail: SPECIMEN
Discharge: HOME | End: 2018-08-01
Attending: INTERNAL MEDICINE | Admitting: INTERNAL MEDICINE
Payer: MEDICARE

## 2018-08-01 VITALS
WEIGHT: 144 LBS | TEMPERATURE: 97.4 F | OXYGEN SATURATION: 96 % | DIASTOLIC BLOOD PRESSURE: 88 MMHG | RESPIRATION RATE: 16 BRPM | HEART RATE: 83 BPM | SYSTOLIC BLOOD PRESSURE: 130 MMHG | BODY MASS INDEX: 24.17 KG/M2

## 2018-08-01 DIAGNOSIS — C90.00 MULTIPLE MYELOMA NOT HAVING ACHIEVED REMISSION (H): Primary | ICD-10-CM

## 2018-08-01 DIAGNOSIS — I48.0 PAROXYSMAL ATRIAL FIBRILLATION (H): ICD-10-CM

## 2018-08-01 LAB
ALBUMIN SERPL-MCNC: 3.5 G/DL (ref 3.4–5)
ALP SERPL-CCNC: 57 U/L (ref 40–150)
ALT SERPL W P-5'-P-CCNC: 21 U/L (ref 0–50)
AST SERPL W P-5'-P-CCNC: 27 U/L (ref 0–45)
BASOPHILS # BLD AUTO: 0 10E9/L (ref 0–0.2)
BASOPHILS NFR BLD AUTO: 0 %
BILIRUB DIRECT SERPL-MCNC: 0.2 MG/DL (ref 0–0.2)
BILIRUB SERPL-MCNC: 0.8 MG/DL (ref 0.2–1.3)
DIFFERENTIAL METHOD BLD: ABNORMAL
EOSINOPHIL # BLD AUTO: 0 10E9/L (ref 0–0.7)
EOSINOPHIL NFR BLD AUTO: 0.2 %
ERYTHROCYTE [DISTWIDTH] IN BLOOD BY AUTOMATED COUNT: 14.4 % (ref 10–15)
HCT VFR BLD AUTO: 36.7 % (ref 35–47)
HGB BLD-MCNC: 12.2 G/DL (ref 11.7–15.7)
IMM GRANULOCYTES # BLD: 0 10E9/L (ref 0–0.4)
IMM GRANULOCYTES NFR BLD: 0.2 %
INR PPP: 3.39 (ref 0.86–1.14)
LYMPHOCYTES # BLD AUTO: 0.4 10E9/L (ref 0.8–5.3)
LYMPHOCYTES NFR BLD AUTO: 5.3 %
MCH RBC QN AUTO: 32.1 PG (ref 26.5–33)
MCHC RBC AUTO-ENTMCNC: 33.2 G/DL (ref 31.5–36.5)
MCV RBC AUTO: 97 FL (ref 78–100)
MONOCYTES # BLD AUTO: 0.2 10E9/L (ref 0–1.3)
MONOCYTES NFR BLD AUTO: 2.3 %
NEUTROPHILS # BLD AUTO: 6.1 10E9/L (ref 1.6–8.3)
NEUTROPHILS NFR BLD AUTO: 92 %
NRBC # BLD AUTO: 0 10*3/UL
NRBC BLD AUTO-RTO: 0 /100
PLATELET # BLD AUTO: 136 10E9/L (ref 150–450)
PROT SERPL-MCNC: 6 G/DL (ref 6.8–8.8)
RBC # BLD AUTO: 3.8 10E12/L (ref 3.8–5.2)
WBC # BLD AUTO: 6.7 10E9/L (ref 4–11)

## 2018-08-01 PROCEDURE — 85025 COMPLETE CBC W/AUTO DIFF WBC: CPT | Performed by: INTERNAL MEDICINE

## 2018-08-01 PROCEDURE — 83883 ASSAY NEPHELOMETRY NOT SPEC: CPT | Performed by: INTERNAL MEDICINE

## 2018-08-01 PROCEDURE — 96367 TX/PROPH/DG ADDL SEQ IV INF: CPT

## 2018-08-01 PROCEDURE — 96415 CHEMO IV INFUSION ADDL HR: CPT

## 2018-08-01 PROCEDURE — 80076 HEPATIC FUNCTION PANEL: CPT | Performed by: INTERNAL MEDICINE

## 2018-08-01 PROCEDURE — A9270 NON-COVERED ITEM OR SERVICE: HCPCS | Mod: GY | Performed by: INTERNAL MEDICINE

## 2018-08-01 PROCEDURE — 25000128 H RX IP 250 OP 636: Performed by: INTERNAL MEDICINE

## 2018-08-01 PROCEDURE — 25000132 ZZH RX MED GY IP 250 OP 250 PS 637: Mod: GY | Performed by: INTERNAL MEDICINE

## 2018-08-01 PROCEDURE — 00000402 ZZHCL STATISTIC TOTAL PROTEIN: Performed by: INTERNAL MEDICINE

## 2018-08-01 PROCEDURE — 85610 PROTHROMBIN TIME: CPT | Performed by: INTERNAL MEDICINE

## 2018-08-01 PROCEDURE — 96413 CHEMO IV INFUSION 1 HR: CPT

## 2018-08-01 PROCEDURE — 96401 CHEMO ANTI-NEOPL SQ/IM: CPT

## 2018-08-01 PROCEDURE — 84165 PROTEIN E-PHORESIS SERUM: CPT | Performed by: INTERNAL MEDICINE

## 2018-08-01 RX ORDER — HEPARIN SODIUM (PORCINE) LOCK FLUSH IV SOLN 100 UNIT/ML 100 UNIT/ML
5 SOLUTION INTRAVENOUS EVERY 8 HOURS
Status: DISCONTINUED | OUTPATIENT
Start: 2018-08-01 | End: 2018-08-01 | Stop reason: HOSPADM

## 2018-08-01 RX ORDER — DIPHENHYDRAMINE HCL 25 MG
50 CAPSULE ORAL ONCE
Status: COMPLETED | OUTPATIENT
Start: 2018-08-01 | End: 2018-08-01

## 2018-08-01 RX ORDER — DEXAMETHASONE 4 MG/1
TABLET ORAL
Qty: 28 TABLET | Refills: 0 | Status: SHIPPED | OUTPATIENT
Start: 2018-08-01 | End: 2018-10-10

## 2018-08-01 RX ORDER — ACETAMINOPHEN 325 MG/1
650 TABLET ORAL ONCE
Status: COMPLETED | OUTPATIENT
Start: 2018-08-01 | End: 2018-08-01

## 2018-08-01 RX ADMIN — SODIUM CHLORIDE 250 ML: 9 INJECTION, SOLUTION INTRAVENOUS at 11:12

## 2018-08-01 RX ADMIN — DIPHENHYDRAMINE HYDROCHLORIDE 50 MG: 25 CAPSULE ORAL at 10:57

## 2018-08-01 RX ADMIN — DEXAMETHASONE SODIUM PHOSPHATE 12 MG: 10 INJECTION, SOLUTION INTRAMUSCULAR; INTRAVENOUS at 11:12

## 2018-08-01 RX ADMIN — SODIUM CHLORIDE, PRESERVATIVE FREE 5 ML: 5 INJECTION INTRAVENOUS at 13:12

## 2018-08-01 RX ADMIN — ACETAMINOPHEN 650 MG: 325 TABLET ORAL at 10:57

## 2018-08-01 RX ADMIN — DARATUMUMAB 1000 MG: 100 INJECTION, SOLUTION, CONCENTRATE INTRAVENOUS at 11:33

## 2018-08-01 RX ADMIN — BORTEZOMIB 2.3 MG: 3.5 INJECTION, POWDER, LYOPHILIZED, FOR SOLUTION INTRAVENOUS; SUBCUTANEOUS at 13:11

## 2018-08-01 ASSESSMENT — PAIN SCALES - GENERAL: PAINLEVEL: NO PAIN (0)

## 2018-08-01 NOTE — MR AVS SNAPSHOT
After Visit Summary   8/1/2018    Amira Arreola    MRN: [id number]           Patient Information     Date Of Birth          7/17/1932        Visit Information        Provider Department      8/1/2018 9:30 AM  INFUSION CHAIR 12 [facility]        Today's Diagnoses     Multiple myeloma not having achieved remission (H)    -  1    Paroxysmal atrial fibrillation (H)           Follow-ups after your visit        Your next 10 appointments already scheduled     Aug 15, 2018  9:00 AM CDT   Level 2 with  INFUSION CHAIR 20   [facility] ([facility])    [facility]  [address]  [place] [address]   [phone]            Aug 15, 2018  9:20 AM CDT   Return Visit with Shayne Roberts MD   [facility] ([facility])    [facility]  [address]  [place] [address]   [phone]            Aug 29, 2018  9:30 AM CDT   Level 2 with  INFUSION CHAIR 15   [facility] ([facility])    [facility]  [address]  [place] [address]   [phone]              Who to contact     If you have questions or need follow up information about today's clinic visit or your schedule please contact [facility] directly at [phone].  Normal or non-critical lab and imaging results will be communicated to you by MyChart, letter or phone within 4 business days after the clinic has received the results. If you do not hear from us within 7 days, please contact the clinic through MyChart or phone. If you have a critical or abnormal lab result, we will notify you by phone as soon as possible.  Submit refill requests through "[employer] or call your pharmacy and they will forward the refill request to us. Please allow 3 business days for your refill to  be completed.          Additional Information About Your Visit        Care EveryWhere ID     This is your Care EveryWhere ID. This could be used by other organizations to access your [facility] medical records  [id number]        Your Vitals Were     Pulse Temperature Respirations Pulse Oximetry BMI (Body Mass Index)       83 97.4  F (36.3  C) (Oral) 16 96% 24.17 kg/m2        Blood Pressure from Last 3 Encounters:   08/01/18 130/88   07/25/18 130/67   07/18/18 132/76    Weight from Last 3 Encounters:   08/01/18 65.3 kg (144 lb)   07/25/18 66.5 kg (146 lb 9.6 oz)   07/18/18 66.2 kg (146 lb)              We Performed the Following     CBC with platelets differential     Hepatic panel     INR     Kappa and lambda light chain     Protein electrophoresis          Today's Medication Changes          These changes are accurate as of 8/1/18  1:35 PM.  If you have any questions, ask your nurse or doctor.               These medicines have changed or have updated prescriptions.        Dose/Directions    * dexamethasone 4 MG tablet   Commonly known as:  DECADRON   This may have changed:  Another medication with the same name was added. Make sure you understand how and when to take each.   Used for:  Multiple myeloma not having achieved remission (H)        Take 20mg (5 tablets) by mouth every week on the morning of velcade injection.   Quantity:  28 tablet   Refills:  3       * dexamethasone 4 MG tablet   Commonly known as:  DECADRON   This may have changed:  You were already taking a medication with the same name, and this prescription was added. Make sure you understand how and when to take each.   Used for:  Multiple myeloma not having achieved remission (H)        Take 20mg (5 tablets) by mouth every week.   Quantity:  28 tablet   Refills:  0       * Notice:  This list has 2 medication(s) that are the same as other medications prescribed for you. Read the directions carefully, and ask your doctor or other care provider to  review them with you.         Where to get your medicines      [id number] - [place], [place] - [address] AT [facility] [address]  [address], [place] [address]     Phone:  [phone]     dexamethasone 4 MG tablet                Primary Care Provider Office Phone # Fax #    Addy Sean Frias [phone] [address] [facility] [address]  [facility] [address]        Equal Access to Services     [facility]: Hadii aad ku hadasho Soomaali, waaxda luqadaha, qaybta kaalmada adeegyada, waxay idiin hayaan adeeg kharaporfirio laguera . So [facility] [phone].    ATENCIÓN: Si habla español, tiene a barlow disposición servicios gratuitos de asistencia lingüística. [facility] [phone].    We comply with applicable federal civil rights laws and [place] laws. We do not discriminate on the basis of race, color, national origin, age, disability, sex, sexual orientation, or gender identity.            Thank you!     Thank you for choosing [facility] AND [facility]  for your care. Our goal is always to provide you with excellent care. Hearing back from our patients is one way we can continue to improve our services. Please take a few minutes to complete the written survey that you may receive in the mail after your visit with us. Thank you!             Your Updated Medication List - Protect others around you: Learn how to safely use, store and throw away your medicines at www.disposemymeds.org.          This list is accurate as of 8/1/18  1:35 PM.  Always use your most recent med list.                   Brand Name Dispense Instructions for use Diagnosis    ACYCLOVIR PO      Take 400 mg by mouth 2 times daily        BENADRYL PO      Take 25 mg by mouth nightly as needed        CALCIUM CITRATE + PO      Take 2,000 mg by mouth daily 2 tabs        carboxymethylcellulose 0.5 % Soln ophthalmic solution    REFRESH PLUS     1 drop 4 times daily         CLARINEX PO      Take by mouth daily Taking claritin        COMPAZINE PO      Take 10 mg by mouth daily as needed        cycloSPORINE 0.05 % ophthalmic emulsion    RESTASIS     Place 1 drop into both eyes every 12 hours        DAILY MULTIVITAMIN PO      Take 1 tablet by mouth daily.    Routine general medical examination at a health care facility       * dexamethasone 4 MG tablet    DECADRON    28 tablet    Take 20mg (5 tablets) by mouth every week on the morning of velcade injection.    Multiple myeloma not having achieved remission (H)       * dexamethasone 4 MG tablet    DECADRON    28 tablet    Take 20mg (5 tablets) by mouth every week.    Multiple myeloma not having achieved remission (H)       erythromycin ophthalmic ointment    ROMYCIN     Place 1 Application into both eyes At Bedtime        * lidocaine-prilocaine cream    EMLA    30 g    Apply topically as needed for moderate pain Apply dollop size amount to port site 30-60 min prior to accessing    Multiple myeloma not having achieved remission (H)       * lidocaine-prilocaine cream    EMLA    30 g    Apply to port site 1 hour prior to access    Multiple myeloma not having achieved remission (H)       LORazepam 0.5 MG tablet    ATIVAN    30 tablet    Take 1 tablet (0.5 mg) by mouth every 8 hours as needed for anxiety    Multiple myeloma not having achieved remission (H)       metoprolol succinate 50 MG 24 hr tablet    TOPROL-XL    270 tablet    TAKE 2 TABLETS BY MOUTH EVERY MORNING, AND 1 TABLET EVERY EVENING    Paroxysmal atrial fibrillation (H)       oxyCODONE IR 15 MG tablet    ROXICODONE    90 tablet    Take 1 tablet (15 mg) by mouth every 8 hours as needed for pain maximum 4 tablet(s) per day    Multiple myeloma not having achieved remission (H)       polyethylene glycol powder    MIRALAX/GLYCOLAX     Take 1 capful by mouth daily as needed    Bilateral leg edema       REFRESH OP           SIMBRINZA 1-0.2 % ophthalmic suspension   Generic drug:   brinzolamide-brimonidine      Place 1 drop into the right eye 2 times daily 1 drop AM and PM        triamcinolone 0.5 % cream    KENALOG    15 g    Apply sparingly to affected area three times daily. 1-2 weeks , as needed    Rash and nonspecific skin eruption       TYLENOL PO      Take 500 mg by mouth every 6 hours as needed for mild pain or fever        UNABLE TO FIND      MEDICATION NAME: Fresh Coat eye drops        VITAMIN D3 PO      Take 1,000 Units by mouth daily        warfarin 4 MG tablet    COUMADIN    110 tablet    TAKE 1-2 TABLETS BY MOUTH EVERY DAY AS DIRECTED BY INR CLINIC    Paroxysmal atrial fibrillation (H), Long-term (current) use of anticoagulants       ZOMETA IV      Inject into the vein every 30 days Every 3 month dosing        * Notice:  This list has 4 medication(s) that are the same as other medications prescribed for you. Read the directions carefully, and ask your doctor or other care provider to review them with you.

## 2018-08-01 NOTE — PROGRESS NOTES
Infusion Nursing Note:  Amira Arreola presents today for C16D1 darazalex/velcade/decadron  Patient seen by provider today: No   present during visit today: Not Applicable.    Note: patient stated that she is taking the acyclovir and decadron as ordered, and does not need a refill at this time.    Intravenous Access:  Labs drawn without difficulty.  Implanted Port.    Treatment Conditions:  Lab Results   Component Value Date    HGB 12.2 08/01/2018     Lab Results   Component Value Date    WBC 6.7 08/01/2018      Lab Results   Component Value Date    ANEU 6.1 08/01/2018     Lab Results   Component Value Date     08/01/2018      Lab Results   Component Value Date     06/13/2017                   Lab Results   Component Value Date    POTASSIUM 4.0 06/13/2017           No results found for: MAG         Lab Results   Component Value Date    CR 0.71 07/25/2018                   Lab Results   Component Value Date    BRADLEY 10.0 07/25/2018                Lab Results   Component Value Date    BILITOTAL 0.8 08/01/2018           Lab Results   Component Value Date    ALBUMIN 3.5 08/01/2018                    Lab Results   Component Value Date    ALT 21 08/01/2018           Lab Results   Component Value Date    AST 27 08/01/2018       Results reviewed, labs MET treatment parameters, ok to proceed with treatment.      Post Infusion Assessment:  Patient tolerated infusion without incident.  Blood return noted pre and post infusion.  Site patent and intact, free from redness, edema or discomfort.  No evidence of extravasations.  Access discontinued per protocol.    Discharge Plan:   Discharge instructions reviewed with: Patient.  Patient and/or family verbalized understanding of discharge instructions and all questions answered.  Copy of AVS reviewed with patient and/or family.  Patient will return 8/15/18 for next appointment.  Patient discharged in stable condition accompanied by: self.  Departure Mode:  Ambulatory.    Ladonna Boo RN

## 2018-08-02 LAB
ALBUMIN SERPL ELPH-MCNC: 3.7 G/DL (ref 3.7–5.1)
ALPHA1 GLOB SERPL ELPH-MCNC: 0.4 G/DL (ref 0.2–0.4)
ALPHA2 GLOB SERPL ELPH-MCNC: 0.8 G/DL (ref 0.5–0.9)
B-GLOBULIN SERPL ELPH-MCNC: 0.6 G/DL (ref 0.6–1)
GAMMA GLOB SERPL ELPH-MCNC: 0.2 G/DL (ref 0.7–1.6)
M PROTEIN SERPL ELPH-MCNC: 0 G/DL
PROT PATTERN SERPL ELPH-IMP: ABNORMAL

## 2018-08-03 ENCOUNTER — ANTICOAGULATION THERAPY VISIT (OUTPATIENT)
Dept: FAMILY MEDICINE | Facility: CLINIC | Age: 83
End: 2018-08-03
Payer: COMMERCIAL

## 2018-08-03 DIAGNOSIS — Z79.01 LONG-TERM (CURRENT) USE OF ANTICOAGULANTS: ICD-10-CM

## 2018-08-03 LAB
KAPPA LC UR-MCNC: 2.01 MG/DL (ref 0.33–1.94)
KAPPA LC/LAMBDA SER: ABNORMAL {RATIO} (ref 0.26–1.65)
LAMBDA LC SERPL-MCNC: <0.25 MG/DL (ref 0.57–2.63)

## 2018-08-03 PROCEDURE — 99207 ZZC NO CHARGE NURSE ONLY: CPT | Performed by: INTERNAL MEDICINE

## 2018-08-03 NOTE — PROGRESS NOTES
ANTICOAGULATION FOLLOW-UP CLINIC VISIT    Patient Name:  Amira Arreola  Date:  8/3/2018  Contact Type:  Telephone    SUBJECTIVE:     Patient Findings     Positives No Problem Findings           OBJECTIVE    INR   Date Value Ref Range Status   08/01/2018 3.39 (H) 0.86 - 1.14 Final       ASSESSMENT / PLAN  INR assessment SUPRA    Recheck INR In: 1 WEEK    INR Location Outside lab      Anticoagulation Summary as of 8/3/2018     INR goal 2.0-3.0   Today's INR 3.39! (8/1/2018)   Warfarin maintenance plan 4 mg (4 mg x 1) every day   Full warfarin instructions 4 mg every day   Weekly warfarin total 28 mg   No change documented Tigist Corral RN   Plan last modified Bri Mcbride RN (6/7/2018)   Next INR check 8/8/2018   Target end date Indefinite    Indications   Long-term (current) use of anticoagulants [Z79.01] [Z79.01]  Atrial fibrillation (H) [I48.91] (Resolved) [I48.91]         Anticoagulation Episode Summary     INR check location     Preferred lab     Send INR reminders to  ANTICOAGULATION    Comments patient have standing INR order to be draw at infusion visit.  advise to call  [facility] when have INR draw for dosing instruction.  patient can be reach at home phone [phone]      Anticoagulation Care Providers     Provider Role Specialty Phone number    Addy Frias MD [facility] Internal Medicine [phone]            See the Encounter Report to view Anticoagulation Flowsheet and Dosing Calendar (Go to Encounters tab in chart review, and find the Anticoagulation Therapy Visit)    Dosage adjustment made based on physician directed care plan. Result note INR result 3.39 done at infusion center on 8/1/18.  Will call patient. No dose change necessary, but recheck 8/8/18 at next infusion appt.    Finally reached spouse. Since it is Friday, I asked him to give patient message:  Continue 4 mg warfarin daily and recheck at infusion center on Wed. He is appreciative.    Tigist Corral, RN

## 2018-08-07 ENCOUNTER — TELEPHONE (OUTPATIENT)
Dept: FAMILY MEDICINE | Facility: CLINIC | Age: 83
End: 2018-08-07

## 2018-08-07 NOTE — TELEPHONE ENCOUNTER
Returned call to patient.    Patient states she did not get the message from her spouse regarding last INR result done at Infusion center and dosing instructions.   Patient confirmed, however, that she has continued to take Warfarin 4mg daily.     Patient is not scheduled at Infusion center this week.   OK for patient to have next INR done at appointment with Dr Roberts 8-15-18.   Will continue taking Warfarin 4mg daily until next INR.      Postponed reminder added to Anticoag basket to look for next INR and call patient with results and instructions.     Bri VIZCARRA RN,BSN

## 2018-08-07 NOTE — TELEPHONE ENCOUNTER
Pt would like to be worked into the schedule in [place] tomorrow 8/8/18.  She will see anybody there.  She needs both ears to be cleaned.  She states they are pretty packed.  Please call to advise.

## 2018-08-07 NOTE — TELEPHONE ENCOUNTER
Look for INR done at Dr Roberts's office 8-15-18.     Call patient with results, Warfarin dosing and next INR date.     Bri VIZCARRA RN,BSN

## 2018-08-07 NOTE — TELEPHONE ENCOUNTER
Copied from 8-3-18 Antico note:    Dosage adjustment made based on physician directed care plan. Result note INR result 3.39 done at infusion center on 8/1/18.  Will call patient. No dose change necessary, but recheck 8/8/18 at next infusion appt.     Finally reached spouse. Since it is Friday, I asked him to give patient message:  Continue 4 mg warfarin daily and recheck at infusion center on Wed. He is appreciative.     Tigist Corral RN

## 2018-08-07 NOTE — TELEPHONE ENCOUNTER
Reason for Call:  Other INR Concerns    Detailed comments: The patient called because she had not heard from the INR nurse since her last Draw  She was informed that he  received the message on Friday and she said that she did not get any message   The patient wants to skip this week's INR, Can she do this?    I don't know if we should leave any more dosing messages with the  may not be reliable in delivering the message     Phone Number Patient can be reached at: Home number on file [phone] (home)    Best Time: anytime    Can we leave a detailed message on this number? YES    Call taken on 8/7/2018 at 11:08 AM by Vera Fajardo

## 2018-08-15 ENCOUNTER — HOSPITAL ENCOUNTER (OUTPATIENT)
Facility: CLINIC | Age: 83
Setting detail: SPECIMEN
Discharge: HOME | End: 2018-08-15
Attending: INTERNAL MEDICINE | Admitting: INTERNAL MEDICINE
Payer: MEDICARE

## 2018-08-15 ENCOUNTER — ANTICOAGULATION THERAPY VISIT (OUTPATIENT)
Dept: FAMILY MEDICINE | Facility: CLINIC | Age: 83
End: 2018-08-15
Payer: COMMERCIAL

## 2018-08-15 ENCOUNTER — INFUSION THERAPY VISIT (OUTPATIENT)
Dept: INFUSION THERAPY | Facility: CLINIC | Age: 83
End: 2018-08-15
Attending: INTERNAL MEDICINE
Payer: MEDICARE

## 2018-08-15 ENCOUNTER — ONCOLOGY VISIT (OUTPATIENT)
Dept: ONCOLOGY | Facility: CLINIC | Age: 83
End: 2018-08-15
Attending: INTERNAL MEDICINE
Payer: MEDICARE

## 2018-08-15 VITALS
SYSTOLIC BLOOD PRESSURE: 158 MMHG | DIASTOLIC BLOOD PRESSURE: 77 MMHG | BODY MASS INDEX: 23.66 KG/M2 | TEMPERATURE: 97.9 F | HEART RATE: 80 BPM | HEIGHT: 65 IN | WEIGHT: 142 LBS | OXYGEN SATURATION: 97 % | RESPIRATION RATE: 16 BRPM

## 2018-08-15 VITALS
BODY MASS INDEX: 23.83 KG/M2 | DIASTOLIC BLOOD PRESSURE: 77 MMHG | SYSTOLIC BLOOD PRESSURE: 158 MMHG | TEMPERATURE: 97.9 F | WEIGHT: 142 LBS | HEART RATE: 80 BPM | RESPIRATION RATE: 16 BRPM | OXYGEN SATURATION: 97 %

## 2018-08-15 DIAGNOSIS — I48.0 PAROXYSMAL ATRIAL FIBRILLATION (H): ICD-10-CM

## 2018-08-15 DIAGNOSIS — C90.00 MULTIPLE MYELOMA NOT HAVING ACHIEVED REMISSION (H): Primary | ICD-10-CM

## 2018-08-15 DIAGNOSIS — Z79.01 LONG-TERM (CURRENT) USE OF ANTICOAGULANTS: ICD-10-CM

## 2018-08-15 LAB
BASOPHILS # BLD AUTO: 0 10E9/L (ref 0–0.2)
BASOPHILS NFR BLD AUTO: 0.1 %
DIFFERENTIAL METHOD BLD: ABNORMAL
EOSINOPHIL # BLD AUTO: 0 10E9/L (ref 0–0.7)
EOSINOPHIL NFR BLD AUTO: 0.4 %
ERYTHROCYTE [DISTWIDTH] IN BLOOD BY AUTOMATED COUNT: 14.9 % (ref 10–15)
HCT VFR BLD AUTO: 35.7 % (ref 35–47)
HGB BLD-MCNC: 11.8 G/DL (ref 11.7–15.7)
IMM GRANULOCYTES # BLD: 0 10E9/L (ref 0–0.4)
IMM GRANULOCYTES NFR BLD: 0.3 %
INR PPP: 2.77 (ref 0.86–1.14)
LYMPHOCYTES # BLD AUTO: 0.3 10E9/L (ref 0.8–5.3)
LYMPHOCYTES NFR BLD AUTO: 4.8 %
MCH RBC QN AUTO: 32.2 PG (ref 26.5–33)
MCHC RBC AUTO-ENTMCNC: 33.1 G/DL (ref 31.5–36.5)
MCV RBC AUTO: 98 FL (ref 78–100)
MONOCYTES # BLD AUTO: 0.2 10E9/L (ref 0–1.3)
MONOCYTES NFR BLD AUTO: 3.1 %
NEUTROPHILS # BLD AUTO: 6.2 10E9/L (ref 1.6–8.3)
NEUTROPHILS NFR BLD AUTO: 91.3 %
NRBC # BLD AUTO: 0 10*3/UL
NRBC BLD AUTO-RTO: 0 /100
PLATELET # BLD AUTO: 166 10E9/L (ref 150–450)
RBC # BLD AUTO: 3.66 10E12/L (ref 3.8–5.2)
WBC # BLD AUTO: 6.8 10E9/L (ref 4–11)

## 2018-08-15 PROCEDURE — 85610 PROTHROMBIN TIME: CPT | Performed by: INTERNAL MEDICINE

## 2018-08-15 PROCEDURE — 25000128 H RX IP 250 OP 636: Mod: JW | Performed by: INTERNAL MEDICINE

## 2018-08-15 PROCEDURE — 99214 OFFICE O/P EST MOD 30 MIN: CPT | Performed by: INTERNAL MEDICINE

## 2018-08-15 PROCEDURE — 99207 ZZC NO CHARGE NURSE ONLY: CPT | Performed by: INTERNAL MEDICINE

## 2018-08-15 PROCEDURE — 96401 CHEMO ANTI-NEOPL SQ/IM: CPT

## 2018-08-15 PROCEDURE — 85025 COMPLETE CBC W/AUTO DIFF WBC: CPT | Performed by: INTERNAL MEDICINE

## 2018-08-15 RX ORDER — METHYLPREDNISOLONE SODIUM SUCCINATE 125 MG/2ML
125 INJECTION, POWDER, LYOPHILIZED, FOR SOLUTION INTRAMUSCULAR; INTRAVENOUS
Status: CANCELLED
Start: 2018-09-12

## 2018-08-15 RX ORDER — EPINEPHRINE 1 MG/ML
0.3 INJECTION, SOLUTION INTRAMUSCULAR; SUBCUTANEOUS EVERY 5 MIN PRN
Status: CANCELLED | OUTPATIENT
Start: 2018-08-29

## 2018-08-15 RX ORDER — ACETAMINOPHEN 325 MG/1
650 TABLET ORAL ONCE
Status: CANCELLED | OUTPATIENT
Start: 2018-08-29

## 2018-08-15 RX ORDER — EPINEPHRINE 0.3 MG/.3ML
0.3 INJECTION SUBCUTANEOUS EVERY 5 MIN PRN
Status: CANCELLED | OUTPATIENT
Start: 2018-09-12

## 2018-08-15 RX ORDER — LORAZEPAM 2 MG/ML
0.5 INJECTION INTRAMUSCULAR EVERY 4 HOURS PRN
Status: CANCELLED
Start: 2018-08-29

## 2018-08-15 RX ORDER — SODIUM CHLORIDE 9 MG/ML
1000 INJECTION, SOLUTION INTRAVENOUS CONTINUOUS PRN
Status: CANCELLED
Start: 2018-08-29

## 2018-08-15 RX ORDER — MEPERIDINE HYDROCHLORIDE 25 MG/ML
25 INJECTION INTRAMUSCULAR; INTRAVENOUS; SUBCUTANEOUS EVERY 30 MIN PRN
Status: CANCELLED | OUTPATIENT
Start: 2018-09-12

## 2018-08-15 RX ORDER — LORAZEPAM 2 MG/ML
0.5 INJECTION INTRAMUSCULAR EVERY 4 HOURS PRN
Status: CANCELLED
Start: 2018-09-12

## 2018-08-15 RX ORDER — ALBUTEROL SULFATE 0.83 MG/ML
2.5 SOLUTION RESPIRATORY (INHALATION)
Status: CANCELLED | OUTPATIENT
Start: 2018-08-29

## 2018-08-15 RX ORDER — HEPARIN SODIUM (PORCINE) LOCK FLUSH IV SOLN 100 UNIT/ML 100 UNIT/ML
5 SOLUTION INTRAVENOUS EVERY 8 HOURS
Status: CANCELLED
Start: 2018-08-29

## 2018-08-15 RX ORDER — SODIUM CHLORIDE 9 MG/ML
1000 INJECTION, SOLUTION INTRAVENOUS CONTINUOUS PRN
Status: CANCELLED
Start: 2018-09-12

## 2018-08-15 RX ORDER — ALBUTEROL SULFATE 0.83 MG/ML
2.5 SOLUTION RESPIRATORY (INHALATION)
Status: CANCELLED | OUTPATIENT
Start: 2018-09-12

## 2018-08-15 RX ORDER — DIPHENHYDRAMINE HYDROCHLORIDE 50 MG/ML
50 INJECTION INTRAMUSCULAR; INTRAVENOUS
Status: CANCELLED
Start: 2018-08-29

## 2018-08-15 RX ORDER — DIPHENHYDRAMINE HCL 25 MG
50 CAPSULE ORAL ONCE
Status: CANCELLED | OUTPATIENT
Start: 2018-08-29

## 2018-08-15 RX ORDER — EPINEPHRINE 0.3 MG/.3ML
0.3 INJECTION SUBCUTANEOUS EVERY 5 MIN PRN
Status: CANCELLED | OUTPATIENT
Start: 2018-08-29

## 2018-08-15 RX ORDER — METHYLPREDNISOLONE SODIUM SUCCINATE 125 MG/2ML
125 INJECTION, POWDER, LYOPHILIZED, FOR SOLUTION INTRAMUSCULAR; INTRAVENOUS
Status: CANCELLED
Start: 2018-08-29

## 2018-08-15 RX ORDER — ALBUTEROL SULFATE 90 UG/1
1-2 AEROSOL, METERED RESPIRATORY (INHALATION)
Status: CANCELLED
Start: 2018-09-12

## 2018-08-15 RX ORDER — HEPARIN SODIUM (PORCINE) LOCK FLUSH IV SOLN 100 UNIT/ML 100 UNIT/ML
5 SOLUTION INTRAVENOUS EVERY 8 HOURS
Status: CANCELLED
Start: 2018-09-12

## 2018-08-15 RX ORDER — HEPARIN SODIUM (PORCINE) LOCK FLUSH IV SOLN 100 UNIT/ML 100 UNIT/ML
5 SOLUTION INTRAVENOUS EVERY 8 HOURS
Status: DISCONTINUED | OUTPATIENT
Start: 2018-08-15 | End: 2018-08-15 | Stop reason: HOSPADM

## 2018-08-15 RX ORDER — OXYCODONE HYDROCHLORIDE 15 MG/1
15 TABLET ORAL EVERY 8 HOURS PRN
Qty: 90 TABLET | Refills: 0 | Status: SHIPPED | OUTPATIENT
Start: 2018-08-15 | End: 2018-09-12

## 2018-08-15 RX ORDER — DIPHENHYDRAMINE HYDROCHLORIDE 50 MG/ML
50 INJECTION INTRAMUSCULAR; INTRAVENOUS
Status: CANCELLED
Start: 2018-09-12

## 2018-08-15 RX ORDER — EPINEPHRINE 1 MG/ML
0.3 INJECTION, SOLUTION INTRAMUSCULAR; SUBCUTANEOUS EVERY 5 MIN PRN
Status: CANCELLED | OUTPATIENT
Start: 2018-09-12

## 2018-08-15 RX ORDER — MEPERIDINE HYDROCHLORIDE 25 MG/ML
25 INJECTION INTRAMUSCULAR; INTRAVENOUS; SUBCUTANEOUS EVERY 30 MIN PRN
Status: CANCELLED | OUTPATIENT
Start: 2018-08-29

## 2018-08-15 RX ORDER — ALBUTEROL SULFATE 90 UG/1
1-2 AEROSOL, METERED RESPIRATORY (INHALATION)
Status: CANCELLED
Start: 2018-08-29

## 2018-08-15 RX ADMIN — BORTEZOMIB 2.3 MG: 3.5 INJECTION, POWDER, LYOPHILIZED, FOR SOLUTION INTRAVENOUS; SUBCUTANEOUS at 10:30

## 2018-08-15 RX ADMIN — SODIUM CHLORIDE, PRESERVATIVE FREE 5 ML: 5 INJECTION INTRAVENOUS at 10:19

## 2018-08-15 ASSESSMENT — PAIN SCALES - GENERAL: PAINLEVEL: NO PAIN (0)

## 2018-08-15 NOTE — PROGRESS NOTES
ANTICOAGULATION FOLLOW-UP CLINIC VISIT    Patient Name:  Amira Arreola  Date:  8/15/2018  Contact Type:  Telephone    SUBJECTIVE:     Patient Findings     Positives No Problem Findings           OBJECTIVE    INR   Date Value Ref Range Status   08/15/2018 2.77 (H) 0.86 - 1.14 Final       ASSESSMENT / PLAN  INR assessment THER    Recheck INR In: 2 WEEKS    INR Location Outside lab      Anticoagulation Summary as of 8/15/2018     INR goal 2.0-3.0   Today's INR 2.77   Warfarin maintenance plan 4 mg (4 mg x 1) every day   Full warfarin instructions 4 mg every day   Weekly warfarin total 28 mg   No change documented Aruna Fajardo RN   Plan last modified Bri Mcbride RN (6/7/2018)   Next INR check 8/29/2018   Target end date Indefinite    Indications   Long-term (current) use of anticoagulants [Z79.01] [Z79.01]  Atrial fibrillation (H) [I48.91] (Resolved) [I48.91]         Anticoagulation Episode Summary     INR check location     Preferred lab     Send INR reminders to [facility] ANTICOAGULATION    Comments patient have standing INR order to be draw at infusion visit.  advise to call  [facility] when have INR draw for dosing instruction.  patient can be reach at home phone [phone]      Anticoagulation Care Providers     Provider Role Specialty Phone number    Addy Frias MD [facility] Internal Medicine [phone]            See the Encounter Report to view Anticoagulation Flowsheet and Dosing Calendar (Go to Encounters tab in chart review, and find the Anticoagulation Therapy Visit)    Spoke with patient. Denies changes in meds/diet. No missed/extra warfarin doses. No unusual bruising/bleeding or other changes. Pt will continue same warfarin dose and recheck INR in 2 weeks, sooner if concerns.     Aruna Fajardo RN

## 2018-08-15 NOTE — MR AVS SNAPSHOT
Amiragino Arreola   8/15/2018   Anticoagulation Therapy Visit    Description:  86 year old female   Provider:  Addy Frias MD   Department:  [facility]           INR as of 8/15/2018     Today's INR 2.77      Anticoagulation Summary as of 8/15/2018     INR goal 2.0-3.0   Today's INR 2.77   Full warfarin instructions 4 mg every day   Next INR check 8/29/2018    Indications   Long-term (current) use of anticoagulants [Z79.01] [Z79.01]  Atrial fibrillation (H) [I48.91] (Resolved) [I48.91]         August 2018 Details    Sun Mon Tue Wed Thu Fri Sat        1               2               3               4                 5               6               7               8               9               10               11                 12               13               14               15      4 mg   See details      16      4 mg         17      4 mg         18      4 mg           19      4 mg         20      4 mg         21      4 mg         22      4 mg         23      4 mg         24      4 mg         25      4 mg           26      4 mg         27      4 mg         28      4 mg         29            30               31                 Date Details   08/15 This INR check       Date of next INR:  8/29/2018         How to take your warfarin dose     To take:  4 mg Take 1 of the 4 mg tablets.

## 2018-08-15 NOTE — PROGRESS NOTES
Infusion Nursing Note:  Amira Arreola presents today for C16D15 velcade  Patient seen by provider today: Yes: Dr. Roberts   present during visit today: Not Applicable.    Note: N/A.    Intravenous Access:  Labs drawn without difficulty.  Implanted Port.    Treatment Conditions:  Lab Results   Component Value Date    HGB 11.8 08/15/2018     Lab Results   Component Value Date    WBC 6.8 08/15/2018      Lab Results   Component Value Date    ANEU 6.2 08/15/2018     Lab Results   Component Value Date     08/15/2018      Lab Results   Component Value Date     06/13/2017                   Lab Results   Component Value Date    POTASSIUM 4.0 06/13/2017           No results found for: MAG         Lab Results   Component Value Date    CR 0.71 07/25/2018                   Lab Results   Component Value Date    BRADLEY 10.0 07/25/2018                Lab Results   Component Value Date    BILITOTAL 0.8 08/01/2018           Lab Results   Component Value Date    ALBUMIN 3.5 08/01/2018                    Lab Results   Component Value Date    ALT 21 08/01/2018           Lab Results   Component Value Date    AST 27 08/01/2018       Results reviewed, labs MET treatment parameters, ok to proceed with treatment.      Post Infusion Assessment:  Patient tolerated injection without incident.  Site patent and intact, free from redness, edema or discomfort.    Discharge Plan:   Copy of AVS reviewed with patient and/or family.  Patient will return 8/29  for next appointment.  Patient discharged in stable condition accompanied by: daughter.  Departure Mode: Ambulatory.    Jamari Gamboa RN

## 2018-08-15 NOTE — MR AVS SNAPSHOT
After Visit Summary   8/15/2018    Amira Arreola    MRN: [id number]           Patient Information     Date Of Birth          7/17/1932        Visit Information        Provider Department      8/15/2018 9:20 AM Shayne Roberts MD [facility]        Today's Diagnoses     Multiple myeloma not having achieved remission (H)    -  1      Care Instructions    Continue chemotherapy.  Continue zometa.  Follow up in 1 month.  Have pharmacy talk to her about dexamethasone.  Prescription given to the patient for oxycodone- LW    The patient is in [facility]- LW          Follow-ups after your visit        Your next 10 appointments already scheduled     Aug 29, 2018  9:30 AM CDT   Level 2 with SH INFUSION CHAIR 15   [facility] ([facility])    [facility]  [address]  [place] [address]   [phone]            Sep 12, 2018  9:30 AM CDT   Level 2 with SH INFUSION CHAIR 2   [facility] ([facility])    [facility]  [address]  [place] [address]   [phone]            Sep 12, 2018 10:00 AM CDT   Return Visit with Shayne Roberts MD   [facility] ([facility])    [facility]  [address]  [place] [address]   [phone]            Sep 26, 2018  9:30 AM CDT   Level 2 with SH INFUSION CHAIR 7   [facility] ([facility])    [facility]  [address]  [place] [address]   [phone]              Who to contact     If you have questions or need follow up information about today's clinic visit or your schedule please contact [facility] directly at [phone].  Normal or non-critical lab and imaging results will be communicated to you by MyChart, letter or phone within 4 business days  after the clinic has received the results. If you do not hear from us within 7 days, please contact the clinic through [employer] or phone. If you have a critical or abnormal lab result, we will notify you by phone as soon as possible.  Submit refill requests through [employer] or call your pharmacy and they will forward the refill request to us. Please allow 3 business days for your refill to be completed.          Additional Information About Your Visit        Care EveryWhere ID     This is your Care EveryWhere ID. This could be used by other organizations to access your [place] medical records  [id number]        Your Vitals Were     Pulse Temperature Respirations Pulse Oximetry BMI (Body Mass Index)       80 97.9  F (36.6  C) (Oral) 16 97% 23.83 kg/m2        Blood Pressure from Last 3 Encounters:   08/15/18 158/77   08/15/18 158/77   08/01/18 130/88    Weight from Last 3 Encounters:   08/15/18 64.4 kg (142 lb)   08/15/18 64.4 kg (142 lb)   08/01/18 65.3 kg (144 lb)              Today, you had the following     No orders found for display         Where to get your medicines      Some of these will need a paper prescription and others can be bought over the counter.  Ask your nurse if you have questions.     Bring a paper prescription for each of these medications     oxyCODONE IR 15 MG tablet         Information about OPIOIDS     PRESCRIPTION OPIOIDS: WHAT YOU NEED TO KNOW   We gave you an opioid (narcotic) pain medicine. It is important to manage your pain, but opioids are not always the best choice. You should first try all the other options your care team gave you. Take this medicine for as short a time (and as few doses) as possible.    Some activities can increase your pain, such as bandage changes or therapy sessions. It may help to take your pain medicine 30 to 60 minutes before these activities. Reduce your stress by getting enough sleep, working on hobbies you enjoy and practicing relaxation or meditation.  Talk to your care team about ways to manage your pain beyond prescription opioids.    These medicines have risks:    DO NOT drive when on new or higher doses of pain medicine. These medicines can affect your alertness and reaction times, and you could be arrested for driving under the influence (DUI). If you need to use opioids long-term, talk to your care team about driving.    DO NOT operate heavy machinery    DO NOT do any other dangerous activities while taking these medicines.    DO NOT drink any alcohol while taking these medicines.     If the opioid prescribed includes acetaminophen, DO NOT take with any other medicines that contain acetaminophen. Read all labels carefully. Look for the word  acetaminophen  or  Tylenol.  Ask your pharmacist if you have questions or are unsure.    You can get addicted to pain medicines, especially if you have a history of addiction (chemical, alcohol or substance dependence). Talk to your care team about ways to reduce this risk.    All opioids tend to cause constipation. Drink plenty of water and eat foods that have a lot of fiber, such as fruits, vegetables, prune juice, apple juice and high-fiber cereal. Take a laxative (Miralax, milk of magnesia, Colace, Senna) if you don t move your bowels at least every other day. Other side effects include upset stomach, sleepiness, dizziness, throwing up, tolerance (needing more of the medicine to have the same effect), physical dependence and slowed breathing.    Store your pills in a secure place, locked if possible. We will not replace any lost or stolen medicine. If you don t finish your medicine, please throw away (dispose) as directed by your pharmacist. The [place] Pollution [employer] has more information about safe disposal: [email or web address]-[email or web address]         Primary Care Provider Office Phone # Fax #    Addy Frias [phone] [address]  [address]  [facility] [address]        Equal Access to Services     [facility] : Hadii liane murcia nichol Galloway, waaxda luqadaha, qaybta karandykira gupta, hung maradiagaedgardoporfirio sadler. So [facility] [phone].    ATENCIÓN: Si habla español, tiene a barlow disposición servicios gratuitos de asistencia lingüística. Barame al [phone].    We comply with applicable federal civil rights laws and [place] laws. We do not discriminate on the basis of race, color, national origin, age, disability, sex, sexual orientation, or gender identity.            Thank you!     Thank you for choosing [facility]  for your care. Our goal is always to provide you with excellent care. Hearing back from our patients is one way we can continue to improve our services. Please take a few minutes to complete the written survey that you may receive in the mail after your visit with us. Thank you!             Your Updated Medication List - Protect others around you: Learn how to safely use, store and throw away your medicines at www.disposemymeds.org.          This list is accurate as of 8/15/18 10:05 AM.  Always use your most recent med list.                   Brand Name Dispense Instructions for use Diagnosis    ACYCLOVIR PO      Take 400 mg by mouth 2 times daily        BENADRYL PO      Take 25 mg by mouth nightly as needed        CALCIUM CITRATE + PO      Take 2,000 mg by mouth daily 2 tabs        carboxymethylcellulose 0.5 % Soln ophthalmic solution    REFRESH PLUS     1 drop 4 times daily        CLARINEX PO      Take by mouth daily Taking claritin        COMPAZINE PO      Take 10 mg by mouth daily as needed        cycloSPORINE 0.05 % ophthalmic emulsion    RESTASIS     Place 1 drop into both eyes every 12 hours        DAILY MULTIVITAMIN PO      Take 1 tablet by mouth daily.    Routine general medical examination at a health care facility       dexamethasone 4 MG tablet    DECADRON    28 tablet    Take 20mg (5 tablets) by  mouth every week.    Multiple myeloma not having achieved remission (H)       erythromycin ophthalmic ointment    ROMYCIN     Place 1 Application into both eyes At Bedtime        lidocaine-prilocaine cream    EMLA    30 g    Apply to port site 1 hour prior to access    Multiple myeloma not having achieved remission (H)       LORazepam 0.5 MG tablet    ATIVAN    30 tablet    Take 1 tablet (0.5 mg) by mouth every 8 hours as needed for anxiety    Multiple myeloma not having achieved remission (H)       metoprolol succinate 50 MG 24 hr tablet    TOPROL-XL    270 tablet    TAKE 2 TABLETS BY MOUTH EVERY MORNING, AND 1 TABLET EVERY EVENING    Paroxysmal atrial fibrillation (H)       oxyCODONE IR 15 MG tablet    ROXICODONE    90 tablet    Take 1 tablet (15 mg) by mouth every 8 hours as needed for pain maximum 4 tablet(s) per day    Multiple myeloma not having achieved remission (H)       polyethylene glycol powder    MIRALAX/GLYCOLAX     Take 1 capful by mouth daily as needed    Bilateral leg edema       REFRESH OP           SIMBRINZA 1-0.2 % ophthalmic suspension   Generic drug:  brinzolamide-brimonidine      Place 1 drop into the right eye 2 times daily 1 drop AM and PM        triamcinolone 0.5 % cream    KENALOG    15 g    Apply sparingly to affected area three times daily. 1-2 weeks , as needed    Rash and nonspecific skin eruption       TYLENOL PO      Take 500 mg by mouth every 6 hours as needed for mild pain or fever        UNABLE TO FIND      MEDICATION NAME: Fresh Coat eye drops        VITAMIN D3 PO      Take 1,000 Units by mouth daily        warfarin 4 MG tablet    COUMADIN    110 tablet    TAKE 1-2 TABLETS BY MOUTH EVERY DAY AS DIRECTED BY INR CLINIC    Paroxysmal atrial fibrillation (H), Long-term (current) use of anticoagulants       ZOMETA IV      Inject into the vein every 30 days Every 3 month dosing

## 2018-08-15 NOTE — Clinical Note
"    8/15/2018         RE: Amira Arreola  [address]  [place] [address]        Dear Colleague,    Thank you for referring your patient, Amira Arreola, to the [facility]. Please see a copy of my visit note below.    Oncology Rooming Note    August 15, 2018 9:16 AM   Amira Arreola is a 86 year old female who presents for:    Chief Complaint   Patient presents with     Oncology Clinic Visit     Multiple myeloma not having achieved remission      Initial Vitals: /77  Pulse 80  Temp 97.9  F (36.6  C) (Oral)  Resp 16  Wt 64.4 kg (142 lb)  SpO2 97%  BMI 23.83 kg/m2 Estimated body mass index is 23.83 kg/(m^2) as calculated from the following:    Height as of 7/18/18: 1.644 m (5' 4.72\").    Weight as of this encounter: 64.4 kg (142 lb). Body surface area is 1.71 meters squared.  No Pain (0) Comment: Data Unavailable   No LMP recorded. Patient is postmenopausal.  Allergies reviewed: Yes  Medications reviewed: Yes    Medications:  MEDICATION REFILL NEEDED ON DEXAMETHASONE, ATIVAN AND OXYCODONE  Pharmacy name entered into [employer]: [facility] [address] [facility], [place] - [address] [facility] [address] [facility] [address]    Clinical concerns: None                         4 minutes for nursing intake (face to face time)     Zora Bentley MA              Visit Date:   08/15/2018      SUBJECTIVE:  Ms. Arreola is an 86-year-old female with kappa free light chain multiple myeloma.  She is on Velcade, dexamethasone and daratumumab.  Now she is on monthly daratumumab.  She was on weekly dexamethasone.  Starting 08/01/2018, it has been changed to every 14 days.  She continues on weekly dexamethasone.  The patient has fatigue.  The patient says that her fatigue has been slowly getting worse.  It is her main problem.  She also has chronic pain in the upper mid back.  She takes oxycodone.  She wants a refill on that.      REVIEW OF SYSTEMS:  No headache.  No dizziness.  No chest pain.  No " difficulty breathing.  No abdominal pain, nausea or vomiting.  No urinary bowel complaints.  No bleeding.  No fever or chills.  She has not fallen recently.      PHYSICAL EXAMINATION:  Unchanged.      LABORATORY DATA:  Reviewed.      ASSESSMENT:   1.  An 86-year-old female with kappa free light chain multiple myeloma which is stable.   2.  Peripheral neuropathy which is stable.   3.  Chronic upper back pain.   4.  Fatigue secondary to old age, myeloma and chemotherapy.      PLAN:  Discussed regarding multiple myeloma.  Overall, it is stable.  In the last 2 months, her kappa light chain has slightly increased from 1.46 to 2.01.  Her monoclonal peak has improved, and is *** now.  I told the patient that overall her myeloma is stable.  She is on Velcade, dexamethasone and daratumumab.  That will be continued.  Overall, she is tolerating it well.  She does have some peripheral neuropathy.  It has not gotten worse.      She has chronic back pain.  She will continue on oxycodone.  Prescription refilled.  She is on Zometa every 3 months.  That will be continued.  She is not having any dental or jaw pain or infection.  No evidence of osteonecrosis of the jaw.        Discussed regarding fatigue.  It is due to multiple factors including her age, myeloma and treatment.  It probably will get worse with further treatment.  She is still able to take care of herself.  She had a few questions, which were all answered.  I will see her in a month.  Advised her to see a physician sooner for worsening pain, infection, recurrent vomiting or any other concerns.         ITZ LOPEZ MD             D: 08/15/2018   T: 08/15/2018   MT: ROXANA      Name:     ALBERTO PARSON   MRN:      [id number]        Account:      [id number]   :      1932           Visit Date:   08/15/2018      Document: [id number]       Again, thank you for allowing me to participate in the care of your patient.        Sincerely,        Itz Lopez MD

## 2018-08-15 NOTE — MR AVS SNAPSHOT
After Visit Summary   8/15/2018    Amira Arreola    MRN: [id number]           Patient Information     Date Of Birth          7/17/1932        Visit Information        Provider Department      8/15/2018 9:00 AM  INFUSION CHAIR 20 [facility]        Today's Diagnoses     Multiple myeloma not having achieved remission (H)    -  1    Paroxysmal atrial fibrillation (H)           Follow-ups after your visit        Follow-up notes from your care team     Return in 2 weeks (on 8/29/2018).      Your next 10 appointments already scheduled     Aug 22, 2018  9:00 AM CDT   XR CHEST 2 VIEWS with CSXR1   [facility] ([facility]    [address] [facility] [address]   [phone]           Please bring a list of your current medicines to your exam. (Include vitamins, minerals and over-thecounter medicines.) Leave your valuables at home.  Tell your doctor if there is a chance you may be pregnant.  You do not need to do anything special for this exam.            Aug 29, 2018  9:30 AM CDT   Level 2 with  INFUSION CHAIR 15   [facility] ([facility])    [facility]  [address]  [facility] [address]   [phone]            Sep 12, 2018  9:30 AM CDT   Level 2 with  INFUSION CHAIR 2   [facility] ([facility])    [facility]  [address]  [facility] [address]   [phone]            Sep 12, 2018 10:00 AM CDT   Return Visit with Shayne Roberts MD   [facility] ([facility])    [facility]  [address]  [facility] [address]   [phone]            Sep 26, 2018  9:30 AM CDT   Level 2 with  INFUSION CHAIR 7   [facility] ([facility])    [facility]  [address]  [place] [id number] [phone]              Who to contact     If you have questions or need follow up information about today's clinic visit or your schedule please contact [facility] directly at [phone].  Normal or non-critical lab and imaging results will be communicated to you by MyChart, letter or phone within 4 business days after the clinic has received the results. If you do not hear from us within 7 days, please contact the clinic through MyChart or phone. If you have a critical or abnormal lab result, we will notify you by phone as soon as possible.  Submit refill requests through [employer] or call your pharmacy and they will forward the refill request to us. Please allow 3 business days for your refill to be completed.          Additional Information About Your Visit        Care EveryWhere ID     This is your Care EveryWhere ID. This could be used by other organizations to access your [place] medical records  [id number]        Your Vitals Were     Pulse Temperature Respirations Height Pulse Oximetry BMI (Body Mass Index)    80 97.9  F (36.6  C) (Oral) 16 1.644 m (5' 4.72\") 97% 23.83 kg/m2       Blood Pressure from Last 3 Encounters:   08/15/18 158/77   08/15/18 158/77   08/01/18 130/88    Weight from Last 3 Encounters:   08/15/18 64.4 kg (142 lb)   08/15/18 64.4 kg (142 lb)   08/01/18 65.3 kg (144 lb)              We Performed the Following     CBC with platelets differential     INR          Where to get your medicines      Some of these will need a paper prescription and others can be bought over the counter.  Ask your nurse if you have questions.     Bring a paper prescription for each of these medications     oxyCODONE IR 15 MG tablet          Primary Care Provider Office Phone # Fax #    Addy Frias [phone] [address]  [place] [address]        Equal Access to Services     SARA ZELAYA AH: Hadii aad " divina Galloway, wajanetda luqadaha, qaybta kaalmada alonso, hung genoin hayaamorteza liusergio ashwinigutierrez laterimorteza doroteo. So [facility] [phone].    ATENCIÓN: Si berela vivian, tiene a barlow disposición servicios gratuitos de asistencia lingüística. Kadie al [phone].    We comply with applicable federal civil rights laws and [place] laws. We do not discriminate on the basis of race, color, national origin, age, disability, sex, sexual orientation, or gender identity.            Thank you!     Thank you for choosing [facility]  for your care. Our goal is always to provide you with excellent care. Hearing back from our patients is one way we can continue to improve our services. Please take a few minutes to complete the written survey that you may receive in the mail after your visit with us. Thank you!             Your Updated Medication List - Protect others around you: Learn how to safely use, store and throw away your medicines at www.disposemymeds.org.          This list is accurate as of 8/15/18  4:11 PM.  Always use your most recent med list.                   Brand Name Dispense Instructions for use Diagnosis    ACYCLOVIR PO      Take 400 mg by mouth 2 times daily        BENADRYL PO      Take 25 mg by mouth nightly as needed        CALCIUM CITRATE + PO      Take 2,000 mg by mouth daily 2 tabs        carboxymethylcellulose 0.5 % Soln ophthalmic solution    REFRESH PLUS     1 drop 4 times daily        CLARINEX PO      Take by mouth daily Taking claritin        COMPAZINE PO      Take 10 mg by mouth daily as needed        cycloSPORINE 0.05 % ophthalmic emulsion    RESTASIS     Place 1 drop into both eyes every 12 hours        DAILY MULTIVITAMIN PO      Take 1 tablet by mouth daily.    Routine general medical examination at a health care facility       dexamethasone 4 MG tablet    DECADRON    28 tablet    Take 20mg (5 tablets) by mouth every week.    Multiple myeloma not having achieved  remission (H)       erythromycin ophthalmic ointment    ROMYCIN     Place 1 Application into both eyes At Bedtime        lidocaine-prilocaine cream    EMLA    30 g    Apply to port site 1 hour prior to access    Multiple myeloma not having achieved remission (H)       LORazepam 0.5 MG tablet    ATIVAN    30 tablet    Take 1 tablet (0.5 mg) by mouth every 8 hours as needed for anxiety    Multiple myeloma not having achieved remission (H)       metoprolol succinate 50 MG 24 hr tablet    TOPROL-XL    270 tablet    TAKE 2 TABLETS BY MOUTH EVERY MORNING, AND 1 TABLET EVERY EVENING    Paroxysmal atrial fibrillation (H)       oxyCODONE IR 15 MG tablet    ROXICODONE    90 tablet    Take 1 tablet (15 mg) by mouth every 8 hours as needed for pain maximum 4 tablet(s) per day    Multiple myeloma not having achieved remission (H)       polyethylene glycol powder    MIRALAX/GLYCOLAX     Take 1 capful by mouth daily as needed    Bilateral leg edema       REFRESH OP           SIMBRINZA 1-0.2 % ophthalmic suspension   Generic drug:  brinzolamide-brimonidine      Place 1 drop into the right eye 2 times daily 1 drop AM and PM        triamcinolone 0.5 % cream    KENALOG    15 g    Apply sparingly to affected area three times daily. 1-2 weeks , as needed    Rash and nonspecific skin eruption       TYLENOL PO      Take 500 mg by mouth every 6 hours as needed for mild pain or fever        UNABLE TO FIND      MEDICATION NAME: Fresh Coat eye drops        VITAMIN D3 PO      Take 1,000 Units by mouth daily        warfarin 4 MG tablet    COUMADIN    110 tablet    TAKE 1-2 TABLETS BY MOUTH EVERY DAY AS DIRECTED BY INR CLINIC    Paroxysmal atrial fibrillation (H), Long-term (current) use of anticoagulants       ZOMETA IV      Inject into the vein every 30 days Every 3 month dosing

## 2018-08-15 NOTE — PROGRESS NOTES
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     1. On 09/21/2015, WBC of 4.2, hemoglobin of 13.2 and platelets of 138.    -On 09/29/2015, SPEP does not reveal any M-spike.   -On 10/02/2015, JANET does not reveal any monoclonal protein.     -On 10/22/2015, urine immunofixation reveals monoclonal free kappa light chain.    2. On 05/11/2016, kappa light chain of 50, lambda light chain of 0.32 and ratio of kappa to lambda of 156.2.  3. Bone marrow biopsy on 05/25/2016 reveals 40-50% kappa light chain restricted plasma cells.  Cytogenetics is normal. FISH panel reveals translocation 11;14.    4. MRI of bones on 06/21/2016 and 06/22/2016 reveals myeloma lesions.  5. On 08/24/2016, she was started on revlimid with dexamethasone 20 mg weekly. She did not have any significant response to treatment.   6. Velcade and dexamethasone started on 03/21/2017.    7. Daratumumab added to velcade and dexamethasone on 05/31/2017.   -Velcade given every 14 days starting 08/01/2018.     SUBJECTIVE:  Ms. Arreola is an 86-year-old female with kappa free light chain multiple myeloma.  She is on Velcade, dexamethasone and daratumumab.  She is on monthly daratumumab.  She was on weekly velcade.  Starting 08/01/2018, it has been changed to every 14 days.  She continues on weekly dexamethasone. Patient has fatigue.  Patient says that her fatigue has been slowly getting worse.  It is her main problem.  She also has chronic pain in the upper mid back.  She takes oxycodone.  She wants a refill on that.      REVIEW OF SYSTEMS:  No headache.  No dizziness.  No chest pain.  No difficulty breathing.  No abdominal pain, nausea or vomiting.  No urinary or bowel complaints.  No bleeding.  No fever or chills.  She has not fallen recently.      PHYSICAL EXAMINATION:   Alert and oriented x 3.   EYES:  No icterus.   THROAT:  No ulcer or thrush.   NECK:  Supple. No  lymphadenopathy.   AXILLAE:  No lymphadenopathy.   LUNGS:  Good air entry bilaterally.  No crackles or wheezing.   HEART:  Regular.  No murmur.   ABDOMEN:  Soft and nontender.  No mass.   EXTREMITIES: Bilateral pedal edema. No calf swelling or tenderness.   SKIN: No petechia.      LABORATORY DATA:  Reviewed.      ASSESSMENT:   1.  An 86-year-old female with kappa free light chain multiple myeloma which is stable.   2.  Peripheral neuropathy which is stable.   3.  Chronic upper back pain.   4.  Fatigue secondary to old age, myeloma and chemotherapy.      PLAN:    1. Discussed regarding multiple myeloma.  Overall, it is stable.  In the last 2 months, her kappa light chain has slightly increased from 1.46 to 2.01.  Her monoclonal peak has improved, and is zero now.  I told the patient that overall her myeloma is stable.  She is on Velcade, dexamethasone and daratumumab.  That will be continued.  Overall, she is tolerating it well.  She does have some peripheral neuropathy.  It has not gotten worse.      2. She has chronic back pain.  She will continue on oxycodone.  Prescription refilled.      3. She is on Zometa every 3 months.  That will be continued.  She is not having any dental or jaw pain or infection.  No evidence of osteonecrosis of the jaw.        4. Discussed regarding fatigue.  It is due to multiple factors including her age, myeloma and treatment.  It probably will get worse with further treatment.  She is still able to take care of herself.      5. She had a few questions, which were all answered.  I will see her in a month.  Advised her to see a physician sooner if she has worsening pain, infection, recurrent vomiting or any other concerns.         ITZ LOPEZ MD             D: 08/15/2018   T: 08/15/2018   MT: ROXANA      Name:     ALBERTO PARSON   MRN:      [id number]        Account:      [id number]   :      1932           Visit Date:   08/15/2018      Document: [id number]

## 2018-08-15 NOTE — PROGRESS NOTES
"Oncology Rooming Note    August 15, 2018 9:16 AM   Amira Arreola is a 86 year old female who presents for:    Chief Complaint   Patient presents with     Oncology Clinic Visit     Multiple myeloma not having achieved remission      Initial Vitals: /77  Pulse 80  Temp 97.9  F (36.6  C) (Oral)  Resp 16  Wt 64.4 kg (142 lb)  SpO2 97%  BMI 23.83 kg/m2 Estimated body mass index is 23.83 kg/(m^2) as calculated from the following:    Height as of 7/18/18: 1.644 m (5' 4.72\").    Weight as of this encounter: 64.4 kg (142 lb). Body surface area is 1.71 meters squared.  No Pain (0) Comment: Data Unavailable   No LMP recorded. Patient is postmenopausal.  Allergies reviewed: Yes  Medications reviewed: Yes    Medications:  MEDICATION REFILL NEEDED ON DEXAMETHASONE, ATIVAN AND OXYCODONE  Pharmacy name entered into [employer]: [facility] [address] [place] [address] 7 AT [facility] [address]    Clinical concerns: None                         4 minutes for nursing intake (face to face time)     Zoar Bentley MA            "

## 2018-08-15 NOTE — PATIENT INSTRUCTIONS
Continue chemotherapy.  Scheduled 3 appointments out/Deneen  Continue zometa.  Will schedule at 8-29-18 visit/Deneen  Follow up in 1 month.scheduled/deneen  Have pharmacy talk to her about dexamethasone.  Prescription given to the patient for oxycodone- LW    NEXT  ZOMETA DOSE 10/17    The patient is in [facility] IT- [facility]    AVS given to patient/deneen

## 2018-08-15 NOTE — TELEPHONE ENCOUNTER
Results received from INR today 8/15/18    Tried calling patient - spouse answered, patient not home. States to call back later today (this afternoon)    ACC encounter started    Aruna ROBERSON RN

## 2018-08-22 ENCOUNTER — RADIANT APPOINTMENT (OUTPATIENT)
Dept: GENERAL RADIOLOGY | Facility: CLINIC | Age: 83
End: 2018-08-22
Attending: INTERNAL MEDICINE
Payer: COMMERCIAL

## 2018-08-22 ENCOUNTER — TELEPHONE (OUTPATIENT)
Dept: FAMILY MEDICINE | Facility: CLINIC | Age: 83
End: 2018-08-22

## 2018-08-22 DIAGNOSIS — R93.89 ABNORMAL CXR: ICD-10-CM

## 2018-08-22 DIAGNOSIS — R91.8 PULMONARY NODULES: Primary | ICD-10-CM

## 2018-08-22 PROCEDURE — 71046 X-RAY EXAM CHEST 2 VIEWS: CPT

## 2018-08-22 NOTE — TELEPHONE ENCOUNTER
Please call patient re cxr done today.  Looks the same, still has tiny nodule so to be safe I want to get ct chest just to be sure.  Please route back to me with answer    Addy Frias M.D.

## 2018-08-22 NOTE — TELEPHONE ENCOUNTER
Called patient with the information and advice from Dr Frias.      Patient states she understands the information and agrees to go ahead with a CT chest.      Please place order.  (Not sure if order needed with or without contrast?).     Thank you,  Bri VIZCARRA RN,BSN

## 2018-08-29 ENCOUNTER — ANTICOAGULATION THERAPY VISIT (OUTPATIENT)
Dept: FAMILY MEDICINE | Facility: CLINIC | Age: 83
End: 2018-08-29
Payer: COMMERCIAL

## 2018-08-29 ENCOUNTER — INFUSION THERAPY VISIT (OUTPATIENT)
Dept: INFUSION THERAPY | Facility: CLINIC | Age: 83
End: 2018-08-29
Attending: INTERNAL MEDICINE
Payer: MEDICARE

## 2018-08-29 ENCOUNTER — HOSPITAL ENCOUNTER (OUTPATIENT)
Dept: CT IMAGING | Facility: CLINIC | Age: 83
Discharge: HOME | End: 2018-08-29
Attending: INTERNAL MEDICINE | Admitting: INTERNAL MEDICINE
Payer: MEDICARE

## 2018-08-29 ENCOUNTER — HOSPITAL ENCOUNTER (OUTPATIENT)
Facility: CLINIC | Age: 83
Setting detail: SPECIMEN
Discharge: HOME | End: 2018-08-29
Attending: INTERNAL MEDICINE | Admitting: INTERNAL MEDICINE
Payer: MEDICARE

## 2018-08-29 VITALS
RESPIRATION RATE: 16 BRPM | HEART RATE: 58 BPM | BODY MASS INDEX: 23.36 KG/M2 | TEMPERATURE: 98 F | WEIGHT: 140.2 LBS | OXYGEN SATURATION: 98 % | DIASTOLIC BLOOD PRESSURE: 80 MMHG | HEIGHT: 65 IN | SYSTOLIC BLOOD PRESSURE: 163 MMHG

## 2018-08-29 DIAGNOSIS — Z79.01 LONG-TERM (CURRENT) USE OF ANTICOAGULANTS: ICD-10-CM

## 2018-08-29 DIAGNOSIS — R91.8 PULMONARY NODULES: ICD-10-CM

## 2018-08-29 DIAGNOSIS — C90.00 MULTIPLE MYELOMA NOT HAVING ACHIEVED REMISSION (H): Primary | ICD-10-CM

## 2018-08-29 DIAGNOSIS — I48.0 PAROXYSMAL ATRIAL FIBRILLATION (H): ICD-10-CM

## 2018-08-29 LAB
ALBUMIN SERPL-MCNC: 3.6 G/DL (ref 3.4–5)
ALP SERPL-CCNC: 56 U/L (ref 40–150)
ALT SERPL W P-5'-P-CCNC: 26 U/L (ref 0–50)
AST SERPL W P-5'-P-CCNC: 23 U/L (ref 0–45)
BASOPHILS # BLD AUTO: 0 10E9/L (ref 0–0.2)
BASOPHILS NFR BLD AUTO: 0.1 %
BILIRUB DIRECT SERPL-MCNC: 0.2 MG/DL (ref 0–0.2)
BILIRUB SERPL-MCNC: 0.6 MG/DL (ref 0.2–1.3)
DIFFERENTIAL METHOD BLD: ABNORMAL
EOSINOPHIL # BLD AUTO: 0 10E9/L (ref 0–0.7)
EOSINOPHIL NFR BLD AUTO: 0.3 %
ERYTHROCYTE [DISTWIDTH] IN BLOOD BY AUTOMATED COUNT: 14.9 % (ref 10–15)
HCT VFR BLD AUTO: 36.2 % (ref 35–47)
HGB BLD-MCNC: 12 G/DL (ref 11.7–15.7)
IMM GRANULOCYTES # BLD: 0 10E9/L (ref 0–0.4)
IMM GRANULOCYTES NFR BLD: 0.1 %
INR PPP: 1.94 (ref 0.86–1.14)
LYMPHOCYTES # BLD AUTO: 0.4 10E9/L (ref 0.8–5.3)
LYMPHOCYTES NFR BLD AUTO: 5.5 %
MCH RBC QN AUTO: 32.3 PG (ref 26.5–33)
MCHC RBC AUTO-ENTMCNC: 33.1 G/DL (ref 31.5–36.5)
MCV RBC AUTO: 97 FL (ref 78–100)
MONOCYTES # BLD AUTO: 0.3 10E9/L (ref 0–1.3)
MONOCYTES NFR BLD AUTO: 3.9 %
NEUTROPHILS # BLD AUTO: 6.2 10E9/L (ref 1.6–8.3)
NEUTROPHILS NFR BLD AUTO: 90.1 %
NRBC # BLD AUTO: 0 10*3/UL
NRBC BLD AUTO-RTO: 0 /100
PLATELET # BLD AUTO: 156 10E9/L (ref 150–450)
PROT SERPL-MCNC: 6.2 G/DL (ref 6.8–8.8)
RBC # BLD AUTO: 3.72 10E12/L (ref 3.8–5.2)
WBC # BLD AUTO: 6.9 10E9/L (ref 4–11)

## 2018-08-29 PROCEDURE — 99207 ZZC NO CHARGE NURSE ONLY: CPT | Performed by: INTERNAL MEDICINE

## 2018-08-29 PROCEDURE — 96401 CHEMO ANTI-NEOPL SQ/IM: CPT

## 2018-08-29 PROCEDURE — 96413 CHEMO IV INFUSION 1 HR: CPT

## 2018-08-29 PROCEDURE — 83883 ASSAY NEPHELOMETRY NOT SPEC: CPT | Performed by: INTERNAL MEDICINE

## 2018-08-29 PROCEDURE — 80076 HEPATIC FUNCTION PANEL: CPT | Performed by: INTERNAL MEDICINE

## 2018-08-29 PROCEDURE — 85610 PROTHROMBIN TIME: CPT | Performed by: INTERNAL MEDICINE

## 2018-08-29 PROCEDURE — 25000132 ZZH RX MED GY IP 250 OP 250 PS 637: Mod: GY | Performed by: INTERNAL MEDICINE

## 2018-08-29 PROCEDURE — 96367 TX/PROPH/DG ADDL SEQ IV INF: CPT

## 2018-08-29 PROCEDURE — 00000402 ZZHCL STATISTIC TOTAL PROTEIN: Performed by: INTERNAL MEDICINE

## 2018-08-29 PROCEDURE — 84165 PROTEIN E-PHORESIS SERUM: CPT | Performed by: INTERNAL MEDICINE

## 2018-08-29 PROCEDURE — 25000128 H RX IP 250 OP 636: Performed by: INTERNAL MEDICINE

## 2018-08-29 PROCEDURE — 85025 COMPLETE CBC W/AUTO DIFF WBC: CPT | Performed by: INTERNAL MEDICINE

## 2018-08-29 PROCEDURE — 96415 CHEMO IV INFUSION ADDL HR: CPT

## 2018-08-29 PROCEDURE — A9270 NON-COVERED ITEM OR SERVICE: HCPCS | Mod: GY | Performed by: INTERNAL MEDICINE

## 2018-08-29 PROCEDURE — 71250 CT THORAX DX C-: CPT

## 2018-08-29 RX ORDER — ACETAMINOPHEN 325 MG/1
650 TABLET ORAL ONCE
Status: COMPLETED | OUTPATIENT
Start: 2018-08-29 | End: 2018-08-29

## 2018-08-29 RX ORDER — HEPARIN SODIUM (PORCINE) LOCK FLUSH IV SOLN 100 UNIT/ML 100 UNIT/ML
5 SOLUTION INTRAVENOUS EVERY 8 HOURS
Status: DISCONTINUED | OUTPATIENT
Start: 2018-08-29 | End: 2018-08-29 | Stop reason: HOSPADM

## 2018-08-29 RX ORDER — DEXAMETHASONE 4 MG/1
TABLET ORAL
Qty: 28 TABLET | Refills: 0 | Status: SHIPPED | OUTPATIENT
Start: 2018-08-29 | End: 2018-12-05 | Stop reason: ALTCHOICE

## 2018-08-29 RX ADMIN — ACETAMINOPHEN 650 MG: 325 TABLET ORAL at 09:55

## 2018-08-29 RX ADMIN — BORTEZOMIB 2.3 MG: 3.5 INJECTION, POWDER, LYOPHILIZED, FOR SOLUTION INTRAVENOUS; SUBCUTANEOUS at 10:40

## 2018-08-29 RX ADMIN — SODIUM CHLORIDE 250 ML: 9 INJECTION, SOLUTION INTRAVENOUS at 09:55

## 2018-08-29 RX ADMIN — DEXAMETHASONE SODIUM PHOSPHATE 12 MG: 10 INJECTION, SOLUTION INTRAMUSCULAR; INTRAVENOUS at 10:03

## 2018-08-29 RX ADMIN — DIPHENHYDRAMINE HYDROCHLORIDE 50 MG: 50 INJECTION, SOLUTION INTRAMUSCULAR; INTRAVENOUS at 10:22

## 2018-08-29 RX ADMIN — DARATUMUMAB 1000 MG: 100 INJECTION, SOLUTION, CONCENTRATE INTRAVENOUS at 10:39

## 2018-08-29 RX ADMIN — SODIUM CHLORIDE, PRESERVATIVE FREE 5 ML: 5 INJECTION INTRAVENOUS at 12:24

## 2018-08-29 ASSESSMENT — PAIN SCALES - GENERAL: PAINLEVEL: NO PAIN (0)

## 2018-08-29 NOTE — MR AVS SNAPSHOT
After Visit Summary   8/29/2018    Amira Arreola    MRN: [id number]           Patient Information     Date Of Birth          7/17/1932        Visit Information        Provider Department      8/29/2018 9:30 AM  INFUSION CHAIR 15 [facility]        Today's Diagnoses     Multiple myeloma not having achieved remission (H)    -  1    Paroxysmal atrial fibrillation (H)           Follow-ups after your visit        Your next 10 appointments already scheduled     Sep 12, 2018  9:30 AM CDT   Level 2 with  INFUSION CHAIR 2   [facility] ([facility])    [facility]  [address]  [place] [address]   [phone]            Sep 12, 2018 10:00 AM CDT   Return Visit with Shayne Roberts MD   [facility]    [facility]  [address]  [place] [address]   [phone]            Sep 26, 2018  9:30 AM CDT   Level 2 with  INFUSION CHAIR 7   [facility] ([facility])    Critical access hospital [facility]  [address]  [place] [address]   [phone]              Who to contact     If you have questions or need follow up information about today's clinic visit or your schedule please contact [facility] directly at [phone].  Normal or non-critical lab and imaging results will be communicated to you by MyChart, letter or phone within 4 business days after the clinic has received the results. If you do not hear from us within 7 days, please contact the clinic through MyChart or phone. If you have a critical or abnormal lab result, we will notify you by phone as soon as possible.  Submit refill requests through [employer] or call your pharmacy and they will forward the refill request to us. Please allow 3 business days for your refill to  "be completed.          Additional Information About Your Visit        Care EveryWhere ID     This is your Care EveryWhere ID. This could be used by other organizations to access your [place] medical records  [id number]        Your Vitals Were     Pulse Temperature Respirations Height Pulse Oximetry BMI (Body Mass Index)    70 98.1  F (36.7  C) (Oral) 16 1.644 m (5' 4.72\") 98% 23.53 kg/m2       Blood Pressure from Last 3 Encounters:   08/29/18 130/76   08/15/18 158/77   08/15/18 158/77    Weight from Last 3 Encounters:   08/29/18 63.6 kg (140 lb 3.2 oz)   08/15/18 64.4 kg (142 lb)   08/15/18 64.4 kg (142 lb)              We Performed the Following     CBC with platelets differential     Hepatic panel     INR     Kappa and lambda light chain     Protein electrophoresis          Today's Medication Changes          These changes are accurate as of 8/29/18 12:26 PM.  If you have any questions, ask your nurse or doctor.               These medicines have changed or have updated prescriptions.        Dose/Directions    * dexamethasone 4 MG tablet   Commonly known as:  DECADRON   This may have changed:  Another medication with the same name was added. Make sure you understand how and when to take each.   Used for:  Multiple myeloma not having achieved remission (H)        Take 20mg (5 tablets) by mouth every week.   Quantity:  28 tablet   Refills:  0       * dexamethasone 4 MG tablet   Commonly known as:  DECADRON   This may have changed:  You were already taking a medication with the same name, and this prescription was added. Make sure you understand how and when to take each.   Used for:  Multiple myeloma not having achieved remission (H)        Take 20mg (5 tablets) by mouth every week.   Quantity:  28 tablet   Refills:  0       * Notice:  This list has 2 medication(s) that are the same as other medications prescribed for you. Read the directions carefully, and ask your doctor or other care provider to review them " with you.         Where to get your medicines      These medications were sent to [facility] - [place], [place] - [address] AT [facility] [address]  [address], [place] [address]     Phone:  [phone]     dexamethasone 4 MG tablet                Primary Care Provider Office Phone # Fax #    Addy Sean Frias [phone] [address] [facility] [address]  [facility] [address]        Equal Access to Services     [facility]: Hadii aad ku hadasho Soomaali, waaxda luqadaha, qaybta kaalmada adeegyada, waxay idiin hayaan adeeg kharaporfirio laguera . So [facility] [phone].    ATENCIÓN: Si habla español, tiene a barlow disposición servicios gratuitos de asistencia lingüística. [facility] [phone].    We comply with applicable federal civil rights laws and [place] laws. We do not discriminate on the basis of race, color, national origin, age, disability, sex, sexual orientation, or gender identity.            Thank you!     Thank you for choosing [facility]  for your care. Our goal is always to provide you with excellent care. Hearing back from our patients is one way we can continue to improve our services. Please take a few minutes to complete the written survey that you may receive in the mail after your visit with us. Thank you!             Your Updated Medication List - Protect others around you: Learn how to safely use, store and throw away your medicines at www.disposemymeds.org.          This list is accurate as of 8/29/18 12:26 PM.  Always use your most recent med list.                   Brand Name Dispense Instructions for use Diagnosis    ACYCLOVIR PO      Take 400 mg by mouth 2 times daily        BENADRYL PO      Take 25 mg by mouth nightly as needed        CALCIUM CITRATE + PO      Take 2,000 mg by mouth daily 2 tabs        carboxymethylcellulose 0.5 % Soln ophthalmic solution    REFRESH PLUS     1 drop 4 times daily        CLARINEX PO       Take by mouth daily Taking claritin        COMPAZINE PO      Take 10 mg by mouth daily as needed        cycloSPORINE 0.05 % ophthalmic emulsion    RESTASIS     Place 1 drop into both eyes every 12 hours        DAILY MULTIVITAMIN PO      Take 1 tablet by mouth daily.    Routine general medical examination at a health care facility       * dexamethasone 4 MG tablet    DECADRON    28 tablet    Take 20mg (5 tablets) by mouth every week.    Multiple myeloma not having achieved remission (H)       * dexamethasone 4 MG tablet    DECADRON    28 tablet    Take 20mg (5 tablets) by mouth every week.    Multiple myeloma not having achieved remission (H)       lidocaine-prilocaine cream    EMLA    30 g    Apply to port site 1 hour prior to access    Multiple myeloma not having achieved remission (H)       LORazepam 0.5 MG tablet    ATIVAN    30 tablet    Take 1 tablet (0.5 mg) by mouth every 8 hours as needed for anxiety    Multiple myeloma not having achieved remission (H)       metoprolol succinate 50 MG 24 hr tablet    TOPROL-XL    270 tablet    TAKE 2 TABLETS BY MOUTH EVERY MORNING, AND 1 TABLET EVERY EVENING    Paroxysmal atrial fibrillation (H)       oxyCODONE IR 15 MG tablet    ROXICODONE    90 tablet    Take 1 tablet (15 mg) by mouth every 8 hours as needed for pain maximum 4 tablet(s) per day    Multiple myeloma not having achieved remission (H)       polyethylene glycol powder    MIRALAX/GLYCOLAX     Take 1 capful by mouth daily as needed    Bilateral leg edema       REFRESH OP           SIMBRINZA 1-0.2 % ophthalmic suspension   Generic drug:  brinzolamide-brimonidine      Place 1 drop into the right eye 2 times daily 1 drop AM and PM        triamcinolone 0.5 % cream    KENALOG    15 g    Apply sparingly to affected area three times daily. 1-2 weeks , as needed    Rash and nonspecific skin eruption       TYLENOL PO      Take 500 mg by mouth every 6 hours as needed for mild pain or fever        UNABLE TO FIND       MEDICATION NAME: Fresh Coat eye drops        VITAMIN D3 PO      Take 1,000 Units by mouth daily        warfarin 4 MG tablet    COUMADIN    110 tablet    TAKE 1-2 TABLETS BY MOUTH EVERY DAY AS DIRECTED BY INR CLINIC    Paroxysmal atrial fibrillation (H), Long-term (current) use of anticoagulants       ZOMETA IV      Inject into the vein every 30 days Every 3 month dosing        * Notice:  This list has 2 medication(s) that are the same as other medications prescribed for you. Read the directions carefully, and ask your doctor or other care provider to review them with you.

## 2018-08-29 NOTE — PROGRESS NOTES
Infusion Nursing Note:  Amira Arreola presents today for D1C17 Darzalex, Velcade.    Patient seen by provider today: No   present during visit today: Not Applicable.    Note: No concerns or changes to report..    Intravenous Access:  Labs drawn without difficulty.  Implanted Port.    Treatment Conditions:  Lab Results   Component Value Date    HGB 12.0 08/29/2018     Lab Results   Component Value Date    WBC 6.9 08/29/2018      Lab Results   Component Value Date    ANEU 6.2 08/29/2018     Lab Results   Component Value Date     08/29/2018      Lab Results   Component Value Date     06/13/2017                   Lab Results   Component Value Date    POTASSIUM 4.0 06/13/2017           No results found for: MAG         Lab Results   Component Value Date    CR 0.71 07/25/2018                   Lab Results   Component Value Date    BRADLEY 10.0 07/25/2018                Lab Results   Component Value Date    BILITOTAL 0.6 08/29/2018           Lab Results   Component Value Date    ALBUMIN 3.6 08/29/2018                    Lab Results   Component Value Date    ALT 26 08/29/2018           Lab Results   Component Value Date    AST 23 08/29/2018       Results reviewed, labs MET treatment parameters, ok to proceed with treatment.      Post Infusion Assessment:  Patient tolerated infusion without incident.  Patient tolerated injection without incident.  Blood return noted pre and post infusion.  Site patent and intact, free from redness, edema or discomfort.  No evidence of extravasations.  Access discontinued per protocol.    Discharge Plan:   Discharge instructions reviewed with: Patient.  Patient and/or family verbalized understanding of discharge instructions and all questions answered.  Copy of AVS reviewed with patient and/or family.  Patient will return 9/12 for next appointment.  Patient discharged in stable condition accompanied by: self.  Departure Mode: Ambulatory.    Rosangela Reese  RN

## 2018-08-29 NOTE — MR AVS SNAPSHOT
Amira IVY Arreola   8/29/2018   Anticoagulation Therapy Visit    Description:  86 year old female   Provider:  Addy Frias MD   Department:  [facility]           INR as of 8/29/2018     Today's INR 1.94!      Anticoagulation Summary as of 8/29/2018     INR goal 2.0-3.0   Today's INR 1.94!   Full warfarin instructions 4 mg every day   Next INR check 9/5/2018    Indications   Long-term (current) use of anticoagulants [Z79.01] [Z79.01]  Atrial fibrillation (H) [I48.91] (Resolved) [I48.91]         August 2018 Details    Sun Mon Tue Wed Thu Fri Sat        1               2               3               4                 5               6               7               8               9               10               11                 12               13               14               15               16               17               18                 19               20               21               22               23               24               25                 26               27               28               29      4 mg   See details      30      4 mg         31      4 mg           Date Details   08/29 This INR check               How to take your warfarin dose     To take:  4 mg Take 1 of the 4 mg tablets.           September 2018 Details    Sun Mon Tue Wed Thu Fri Sat           1      4 mg           2      4 mg         3      4 mg         4      4 mg         5            6               7               8                 9               10               11               12               13               14               15                 16               17               18               19               20               21               22                 23               24               25               26               27               28               29                 30                      Date Details   No additional details    Date of next INR:  9/5/2018         How to take  your warfarin dose     To take:  4 mg Take 1 of the 4 mg tablets.

## 2018-08-29 NOTE — PROGRESS NOTES
ANTICOAGULATION FOLLOW-UP CLINIC VISIT    Patient Name:  Amira Arreola  Date:  8/29/2018  Contact Type:  Telephone    SUBJECTIVE:     Patient Findings     Positives No Problem Findings           OBJECTIVE    INR   Date Value Ref Range Status   08/29/2018 1.94 (H) 0.86 - 1.14 Final       ASSESSMENT / PLAN  INR assessment THER    Recheck INR In: 1 WEEK    INR Location Outside lab      Anticoagulation Summary as of 8/29/2018     INR goal 2.0-3.0   Today's INR 1.94!   Warfarin maintenance plan 4 mg (4 mg x 1) every day   Full warfarin instructions 4 mg every day   Weekly warfarin total 28 mg   No change documented Michelle Pinon RN   Plan last modified Bri Mcbride RN (6/7/2018)   Next INR check 9/5/2018   Target end date Indefinite    Indications   Long-term (current) use of anticoagulants [Z79.01] [Z79.01]  Atrial fibrillation (H) [I48.91] (Resolved) [I48.91]         Anticoagulation Episode Summary     INR check location     Preferred lab     Send INR reminders to [facility] ANT[facility]AG[facility]    Comments patient have standing INR order to be draw at infusion visit.  advise to call [facility] when have INR draw for dosing instruction.  patient can be reach at home phone [phone]      Anticoagulation Care Providers     Provider Role Specialty Phone number    Addy Frias MD [facility] [phone]            See the Encounter Report to view Anticoagulation Flowsheet and Dosing Calendar (Go to Encounters tab in chart review, and find the Anticoagulation Therapy Visit)    Dosage adjustment made based on physician directed care plan.  Left detailed VM for pt  Asked that she callback with questions/concerns/symptoms/etc    Michelle Pinon RN

## 2018-08-30 PROBLEM — R91.1 LUNG NODULE: Status: ACTIVE | Noted: 2018-08-01

## 2018-08-30 LAB
ALBUMIN SERPL ELPH-MCNC: 3.9 G/DL (ref 3.7–5.1)
ALPHA1 GLOB SERPL ELPH-MCNC: 0.3 G/DL (ref 0.2–0.4)
ALPHA2 GLOB SERPL ELPH-MCNC: 0.7 G/DL (ref 0.5–0.9)
B-GLOBULIN SERPL ELPH-MCNC: 0.6 G/DL (ref 0.6–1)
GAMMA GLOB SERPL ELPH-MCNC: 0.2 G/DL (ref 0.7–1.6)
KAPPA LC UR-MCNC: 2.71 MG/DL (ref 0.33–1.94)
KAPPA LC/LAMBDA SER: 6.3 {RATIO} (ref 0.26–1.65)
LAMBDA LC SERPL-MCNC: <0.25 MG/DL (ref 0.57–2.63)
M PROTEIN SERPL ELPH-MCNC: 0 G/DL
PROT PATTERN SERPL ELPH-IMP: ABNORMAL

## 2018-09-12 ENCOUNTER — ONCOLOGY VISIT (OUTPATIENT)
Dept: ONCOLOGY | Facility: CLINIC | Age: 83
End: 2018-09-12
Attending: INTERNAL MEDICINE
Payer: MEDICARE

## 2018-09-12 ENCOUNTER — ANTICOAGULATION THERAPY VISIT (OUTPATIENT)
Dept: FAMILY MEDICINE | Facility: CLINIC | Age: 83
End: 2018-09-12
Payer: COMMERCIAL

## 2018-09-12 ENCOUNTER — OFFICE VISIT (OUTPATIENT)
Dept: FAMILY MEDICINE | Facility: CLINIC | Age: 83
End: 2018-09-12
Payer: COMMERCIAL

## 2018-09-12 ENCOUNTER — HOSPITAL ENCOUNTER (OUTPATIENT)
Facility: CLINIC | Age: 83
Setting detail: SPECIMEN
Discharge: HOME | End: 2018-09-12
Attending: INTERNAL MEDICINE | Admitting: INTERNAL MEDICINE
Payer: MEDICARE

## 2018-09-12 ENCOUNTER — INFUSION THERAPY VISIT (OUTPATIENT)
Dept: INFUSION THERAPY | Facility: CLINIC | Age: 83
End: 2018-09-12
Attending: INTERNAL MEDICINE
Payer: MEDICARE

## 2018-09-12 VITALS
RESPIRATION RATE: 14 BRPM | SYSTOLIC BLOOD PRESSURE: 152 MMHG | OXYGEN SATURATION: 98 % | BODY MASS INDEX: 23.5 KG/M2 | HEART RATE: 72 BPM | DIASTOLIC BLOOD PRESSURE: 86 MMHG | WEIGHT: 140 LBS | TEMPERATURE: 97.7 F

## 2018-09-12 VITALS
SYSTOLIC BLOOD PRESSURE: 154 MMHG | DIASTOLIC BLOOD PRESSURE: 86 MMHG | HEART RATE: 80 BPM | BODY MASS INDEX: 23.5 KG/M2 | WEIGHT: 140 LBS

## 2018-09-12 VITALS
OXYGEN SATURATION: 98 % | SYSTOLIC BLOOD PRESSURE: 152 MMHG | TEMPERATURE: 97.7 F | RESPIRATION RATE: 14 BRPM | WEIGHT: 140 LBS | BODY MASS INDEX: 23.32 KG/M2 | DIASTOLIC BLOOD PRESSURE: 86 MMHG | HEIGHT: 65 IN | HEART RATE: 72 BPM

## 2018-09-12 DIAGNOSIS — I48.0 PAROXYSMAL ATRIAL FIBRILLATION (H): ICD-10-CM

## 2018-09-12 DIAGNOSIS — C90.00 MULTIPLE MYELOMA NOT HAVING ACHIEVED REMISSION (H): Primary | ICD-10-CM

## 2018-09-12 DIAGNOSIS — H61.21 IMPACTED CERUMEN OF RIGHT EAR: Primary | ICD-10-CM

## 2018-09-12 DIAGNOSIS — Z79.01 LONG-TERM (CURRENT) USE OF ANTICOAGULANTS: ICD-10-CM

## 2018-09-12 DIAGNOSIS — Z95.828 PORTACATH IN PLACE: ICD-10-CM

## 2018-09-12 LAB
BASOPHILS # BLD AUTO: 0 10E9/L (ref 0–0.2)
BASOPHILS NFR BLD AUTO: 0 %
DIFFERENTIAL METHOD BLD: ABNORMAL
EOSINOPHIL # BLD AUTO: 0 10E9/L (ref 0–0.7)
EOSINOPHIL NFR BLD AUTO: 0.3 %
ERYTHROCYTE [DISTWIDTH] IN BLOOD BY AUTOMATED COUNT: 14.7 % (ref 10–15)
HCT VFR BLD AUTO: 36.4 % (ref 35–47)
HGB BLD-MCNC: 12 G/DL (ref 11.7–15.7)
IMM GRANULOCYTES # BLD: 0 10E9/L (ref 0–0.4)
IMM GRANULOCYTES NFR BLD: 0.1 %
INR PPP: 1.64 (ref 0.86–1.14)
LYMPHOCYTES # BLD AUTO: 0.4 10E9/L (ref 0.8–5.3)
LYMPHOCYTES NFR BLD AUTO: 5.5 %
MCH RBC QN AUTO: 32 PG (ref 26.5–33)
MCHC RBC AUTO-ENTMCNC: 33 G/DL (ref 31.5–36.5)
MCV RBC AUTO: 97 FL (ref 78–100)
MONOCYTES # BLD AUTO: 0.2 10E9/L (ref 0–1.3)
MONOCYTES NFR BLD AUTO: 2.4 %
NEUTROPHILS # BLD AUTO: 6.5 10E9/L (ref 1.6–8.3)
NEUTROPHILS NFR BLD AUTO: 91.7 %
NRBC # BLD AUTO: 0 10*3/UL
NRBC BLD AUTO-RTO: 0 /100
PLATELET # BLD AUTO: 150 10E9/L (ref 150–450)
RBC # BLD AUTO: 3.75 10E12/L (ref 3.8–5.2)
WBC # BLD AUTO: 7.1 10E9/L (ref 4–11)

## 2018-09-12 PROCEDURE — 25000128 H RX IP 250 OP 636: Performed by: INTERNAL MEDICINE

## 2018-09-12 PROCEDURE — 96401 CHEMO ANTI-NEOPL SQ/IM: CPT

## 2018-09-12 PROCEDURE — 99207 ZZC NO CHARGE NURSE ONLY: CPT | Performed by: INTERNAL MEDICINE

## 2018-09-12 PROCEDURE — 85610 PROTHROMBIN TIME: CPT | Performed by: INTERNAL MEDICINE

## 2018-09-12 PROCEDURE — 99213 OFFICE O/P EST LOW 20 MIN: CPT | Performed by: INTERNAL MEDICINE

## 2018-09-12 PROCEDURE — 85025 COMPLETE CBC W/AUTO DIFF WBC: CPT | Performed by: INTERNAL MEDICINE

## 2018-09-12 PROCEDURE — 99213 OFFICE O/P EST LOW 20 MIN: CPT | Performed by: FAMILY MEDICINE

## 2018-09-12 RX ORDER — EPINEPHRINE 0.3 MG/.3ML
0.3 INJECTION SUBCUTANEOUS EVERY 5 MIN PRN
Status: CANCELLED | OUTPATIENT
Start: 2018-09-26

## 2018-09-12 RX ORDER — EPINEPHRINE 1 MG/ML
0.3 INJECTION, SOLUTION INTRAMUSCULAR; SUBCUTANEOUS EVERY 5 MIN PRN
Status: CANCELLED | OUTPATIENT
Start: 2018-10-10

## 2018-09-12 RX ORDER — DIPHENHYDRAMINE HYDROCHLORIDE 50 MG/ML
50 INJECTION INTRAMUSCULAR; INTRAVENOUS
Status: CANCELLED
Start: 2018-10-10

## 2018-09-12 RX ORDER — ZOLEDRONIC ACID 0.04 MG/ML
4 INJECTION, SOLUTION INTRAVENOUS ONCE
Status: CANCELLED | OUTPATIENT
Start: 2018-10-24 | End: 2018-10-17

## 2018-09-12 RX ORDER — ALBUTEROL SULFATE 0.83 MG/ML
2.5 SOLUTION RESPIRATORY (INHALATION)
Status: CANCELLED | OUTPATIENT
Start: 2018-09-26

## 2018-09-12 RX ORDER — METHYLPREDNISOLONE SODIUM SUCCINATE 125 MG/2ML
125 INJECTION, POWDER, LYOPHILIZED, FOR SOLUTION INTRAMUSCULAR; INTRAVENOUS
Status: CANCELLED
Start: 2018-09-26

## 2018-09-12 RX ORDER — EPINEPHRINE 1 MG/ML
0.3 INJECTION, SOLUTION INTRAMUSCULAR; SUBCUTANEOUS EVERY 5 MIN PRN
Status: CANCELLED | OUTPATIENT
Start: 2018-09-26

## 2018-09-12 RX ORDER — LORAZEPAM 2 MG/ML
0.5 INJECTION INTRAMUSCULAR EVERY 4 HOURS PRN
Status: CANCELLED
Start: 2018-10-10

## 2018-09-12 RX ORDER — ACETAMINOPHEN 325 MG/1
650 TABLET ORAL ONCE
Status: CANCELLED | OUTPATIENT
Start: 2018-09-26

## 2018-09-12 RX ORDER — SODIUM CHLORIDE 9 MG/ML
1000 INJECTION, SOLUTION INTRAVENOUS CONTINUOUS PRN
Status: CANCELLED
Start: 2018-10-10

## 2018-09-12 RX ORDER — DIPHENHYDRAMINE HCL 25 MG
50 CAPSULE ORAL ONCE
Status: CANCELLED | OUTPATIENT
Start: 2018-09-26

## 2018-09-12 RX ORDER — ALBUTEROL SULFATE 0.83 MG/ML
2.5 SOLUTION RESPIRATORY (INHALATION)
Status: CANCELLED | OUTPATIENT
Start: 2018-10-10

## 2018-09-12 RX ORDER — ALBUTEROL SULFATE 90 UG/1
1-2 AEROSOL, METERED RESPIRATORY (INHALATION)
Status: CANCELLED
Start: 2018-09-26

## 2018-09-12 RX ORDER — LORAZEPAM 2 MG/ML
0.5 INJECTION INTRAMUSCULAR EVERY 4 HOURS PRN
Status: CANCELLED
Start: 2018-09-26

## 2018-09-12 RX ORDER — LORAZEPAM 0.5 MG/1
0.5 TABLET ORAL EVERY 8 HOURS PRN
Qty: 30 TABLET | Refills: 3 | Status: SHIPPED | OUTPATIENT
Start: 2018-09-12 | End: 2019-03-22

## 2018-09-12 RX ORDER — METHYLPREDNISOLONE SODIUM SUCCINATE 125 MG/2ML
125 INJECTION, POWDER, LYOPHILIZED, FOR SOLUTION INTRAMUSCULAR; INTRAVENOUS
Status: CANCELLED
Start: 2018-10-10

## 2018-09-12 RX ORDER — DIPHENHYDRAMINE HYDROCHLORIDE 50 MG/ML
50 INJECTION INTRAMUSCULAR; INTRAVENOUS
Status: CANCELLED
Start: 2018-09-26

## 2018-09-12 RX ORDER — SODIUM CHLORIDE 9 MG/ML
1000 INJECTION, SOLUTION INTRAVENOUS CONTINUOUS PRN
Status: CANCELLED
Start: 2018-09-26

## 2018-09-12 RX ORDER — MEPERIDINE HYDROCHLORIDE 25 MG/ML
25 INJECTION INTRAMUSCULAR; INTRAVENOUS; SUBCUTANEOUS EVERY 30 MIN PRN
Status: CANCELLED | OUTPATIENT
Start: 2018-10-10

## 2018-09-12 RX ORDER — ALBUTEROL SULFATE 90 UG/1
1-2 AEROSOL, METERED RESPIRATORY (INHALATION)
Status: CANCELLED
Start: 2018-10-10

## 2018-09-12 RX ORDER — HEPARIN SODIUM (PORCINE) LOCK FLUSH IV SOLN 100 UNIT/ML 100 UNIT/ML
5 SOLUTION INTRAVENOUS EVERY 8 HOURS
Status: CANCELLED
Start: 2018-10-10

## 2018-09-12 RX ORDER — OXYCODONE HYDROCHLORIDE 15 MG/1
15 TABLET ORAL EVERY 8 HOURS PRN
Qty: 90 TABLET | Refills: 0 | Status: SHIPPED | OUTPATIENT
Start: 2018-09-12 | End: 2018-10-10

## 2018-09-12 RX ORDER — HEPARIN SODIUM (PORCINE) LOCK FLUSH IV SOLN 100 UNIT/ML 100 UNIT/ML
500 SOLUTION INTRAVENOUS EVERY 8 HOURS
Status: DISCONTINUED | OUTPATIENT
Start: 2018-09-12 | End: 2018-09-12 | Stop reason: HOSPADM

## 2018-09-12 RX ORDER — EPINEPHRINE 0.3 MG/.3ML
0.3 INJECTION SUBCUTANEOUS EVERY 5 MIN PRN
Status: CANCELLED | OUTPATIENT
Start: 2018-10-10

## 2018-09-12 RX ORDER — MEPERIDINE HYDROCHLORIDE 25 MG/ML
25 INJECTION INTRAMUSCULAR; INTRAVENOUS; SUBCUTANEOUS EVERY 30 MIN PRN
Status: CANCELLED | OUTPATIENT
Start: 2018-09-26

## 2018-09-12 RX ORDER — HEPARIN SODIUM (PORCINE) LOCK FLUSH IV SOLN 100 UNIT/ML 100 UNIT/ML
5 SOLUTION INTRAVENOUS EVERY 8 HOURS
Status: CANCELLED
Start: 2018-09-26

## 2018-09-12 RX ADMIN — BORTEZOMIB 2.3 MG: 3.5 INJECTION, POWDER, LYOPHILIZED, FOR SOLUTION INTRAVENOUS; SUBCUTANEOUS at 11:17

## 2018-09-12 RX ADMIN — SODIUM CHLORIDE, PRESERVATIVE FREE 500 UNITS: 5 INJECTION INTRAVENOUS at 10:01

## 2018-09-12 ASSESSMENT — PAIN SCALES - GENERAL: PAINLEVEL: NO PAIN (0)

## 2018-09-12 NOTE — MR AVS SNAPSHOT
After Visit Summary   9/12/2018    Amira Arreola    MRN: [id number]           Patient Information     Date Of Birth          7/17/1932        Visit Information        Provider Department      9/12/2018 12:40 PM Angelo Cruz MD [facility] [place]        Today's Diagnoses     Impacted cerumen of right ear    -  1       Follow-ups after your visit        Follow-up notes from your care team     Return in about 1 week (around 9/19/2018) for if symptom does not get better.      Your next 10 appointments already scheduled     Sep 26, 2018  9:30 AM CDT   Level 2 with  INFUSION CHAIR 7   [facility])    [facility]  [address]  [place] [address]   [phone]            Oct 10, 2018  9:30 AM CDT   Level 2 with SH INFUSION CHAIR 13   [facility] ([facility])    [facility]  [address]  [place] [address]   [phone]            Oct 10, 2018 10:40 AM CDT   Return Visit with Shayne Roberts MD   [facility] ([facility])    [facility]  [address]  [place] [address]   [phone]              Who to contact     If you have questions or need follow up information about today's clinic visit or your schedule please contact [facility] directly at [phone].  Normal or non-critical lab and imaging results will be communicated to you by MyChart, letter or phone within 4 business days after the clinic has received the results. If you do not hear from us within 7 days, please contact the clinic through MyChart or phone. If you have a critical or abnormal lab result, we will notify you by phone as soon as possible.  Submit refill requests through [employer] or call your pharmacy and they will forward the refill request to us. Please allow 3  business days for your refill to be completed.          Additional Information About Your Visit        Care EveryWhere ID     This is your Care EveryWhere ID. This could be used by other organizations to access your [place] medical records  [id number]        Your Vitals Were     Pulse BMI (Body Mass Index)                80 23.5 kg/m2           Blood Pressure from Last 3 Encounters:   09/12/18 154/86   09/12/18 152/86   09/12/18 152/86    Weight from Last 3 Encounters:   09/12/18 140 lb (63.5 kg)   09/12/18 140 lb (63.5 kg)   09/12/18 140 lb (63.5 kg)              Today, you had the following     No orders found for display         Where to get your medicines      Some of these will need a paper prescription and others can be bought over the counter.  Ask your nurse if you have questions.     Bring a paper prescription for each of these medications     LORazepam 0.5 MG tablet    oxyCODONE IR 15 MG tablet          Primary Care Provider Office Phone # Fax #    Addy Frias [phone] [address] [address]        Equal Access to Services     [facility] : Hadii liane ku nichol [place], waaxda brenda, qaybta malena gupta, hung dominique . So [facility] [phone].    ATENCIÓN: Si habla español, tiene a barlow disposición servicios gratuitos de asistencia lingüística. [facility] [phone].    We comply with applicable federal civil rights laws and [place] laws. We do not discriminate on the basis of race, color, national origin, age, disability, sex, sexual orientation, or gender identity.            Thank you!     Thank you for choosing [facility]  for your care. Our goal is always to provide you with excellent care. Hearing back from our patients is one way we can continue to improve our services. Please take a few minutes to complete the written survey that you may receive in the mail after your visit with us. Thank  you!             Your Updated Medication List - Protect others around you: Learn how to safely use, store and throw away your medicines at www.disposemymeds.org.          This list is accurate as of 9/12/18  1:13 PM.  Always use your most recent med list.                   Brand Name Dispense Instructions for use Diagnosis    ACYCLOVIR PO      Take 400 mg by mouth 2 times daily        BENADRYL PO      Take 25 mg by mouth nightly as needed        CALCIUM CITRATE + PO      Take 2,000 mg by mouth daily 2 tabs        carboxymethylcellulose 0.5 % Soln ophthalmic solution    REFRESH PLUS     1 drop 4 times daily        CLARINEX PO      Take by mouth daily Taking claritin        COMPAZINE PO      Take 10 mg by mouth daily as needed        cycloSPORINE 0.05 % ophthalmic emulsion    RESTASIS     Place 1 drop into both eyes every 12 hours        DAILY MULTIVITAMIN PO      Take 1 tablet by mouth daily.    Routine general medical examination at a health care facility       * dexamethasone 4 MG tablet    DECADRON    28 tablet    Take 20mg (5 tablets) by mouth every week.    Multiple myeloma not having achieved remission (H)       * dexamethasone 4 MG tablet    DECADRON    28 tablet    Take 20mg (5 tablets) by mouth every week.    Multiple myeloma not having achieved remission (H)       lidocaine-prilocaine cream    EMLA    30 g    Apply to port site 1 hour prior to access    Multiple myeloma not having achieved remission (H)       LORazepam 0.5 MG tablet    ATIVAN    30 tablet    Take 1 tablet (0.5 mg) by mouth every 8 hours as needed for anxiety    Multiple myeloma not having achieved remission (H)       metoprolol succinate 50 MG 24 hr tablet    TOPROL-XL    270 tablet    TAKE 2 TABLETS BY MOUTH EVERY MORNING, AND 1 TABLET EVERY EVENING    Paroxysmal atrial fibrillation (H)       oxyCODONE IR 15 MG tablet    ROXICODONE    90 tablet    Take 1 tablet (15 mg) by mouth every 8 hours as needed for pain maximum 4 tablet(s) per day     Multiple myeloma not having achieved remission (H)       polyethylene glycol powder    MIRALAX/GLYCOLAX     Take 1 capful by mouth daily as needed    Bilateral leg edema       REFRESH OP           SIMBRINZA 1-0.2 % ophthalmic suspension   Generic drug:  brinzolamide-brimonidine      Place 1 drop into the right eye 2 times daily 1 drop AM and PM        triamcinolone 0.5 % cream    KENALOG    15 g    Apply sparingly to affected area three times daily. 1-2 weeks , as needed    Rash and nonspecific skin eruption       TYLENOL PO      Take 500 mg by mouth every 6 hours as needed for mild pain or fever        UNABLE TO FIND      MEDICATION NAME: Fresh Coat eye drops        VITAMIN D3 PO      Take 1,000 Units by mouth daily        warfarin 4 MG tablet    COUMADIN    110 tablet    TAKE 1-2 TABLETS BY MOUTH EVERY DAY AS DIRECTED BY INR CLINIC    Paroxysmal atrial fibrillation (H), Long-term (current) use of anticoagulants       ZOMETA IV      Inject into the vein every 30 days Every 3 month dosing        * Notice:  This list has 2 medication(s) that are the same as other medications prescribed for you. Read the directions carefully, and ask your doctor or other care provider to review them with you.

## 2018-09-12 NOTE — MR AVS SNAPSHOT
Amira IVY Arreola   9/12/2018   Anticoagulation Therapy Visit    Description:  86 year old female   Provider:  Addy Frias MD   Department:  [facility]           INR as of 9/12/2018     Today's INR 1.64!      Anticoagulation Summary as of 9/12/2018     INR goal 2.0-3.0   Today's INR 1.64!   Full warfarin instructions 9/12: 6 mg; Otherwise 4 mg every day   Next INR check 9/19/2018    Indications   Long-term (current) use of anticoagulants [Z79.01] [Z79.01]  Atrial fibrillation (H) [I48.91] (Resolved) [I48.91]         September 2018 Details    Sun Mon Tue Wed Thu Fri Sat           1                 2               3               4               5               6               7               8                 9               10               11               12      6 mg   See details      13      4 mg         14      4 mg         15      4 mg           16      4 mg         17      4 mg         18      4 mg         19            20               21               22                 23               24               25               26               27               28               29                 30                      Date Details   09/12 This INR check       Date of next INR:  9/19/2018         How to take your warfarin dose     To take:  4 mg Take 1 of the 4 mg tablets.    To take:  6 mg Take 1.5 of the 4 mg tablets.

## 2018-09-12 NOTE — PATIENT INSTRUCTIONS
Continue chemotherapy.  Scheduled/aida  Inform anti-coagulation clinic of INR.  Follow up in 1 month.  Scheduled/aida    The patient is in [place] [employer]- [employer]      AVS printed & given to patient/aida

## 2018-09-12 NOTE — PROGRESS NOTES
SUBJECTIVE:   Amira Arreola is a 86 year old female who presents to clinic today for the following health issues:      Concern - right ear plugged   Patient has plugged feeling on the right ear.    Denies any other symptoms.        Problem list and histories reviewed & adjusted, as indicated.  Additional history: as documented        Reviewed and updated as needed this visit by clinical staff  Tobacco  Allergies  Meds       Reviewed and updated as needed this visit by Provider         ROS:  CONSTITUTIONAL: NEGATIVE for fever, chills, change in weight  ENT/MOUTH: Positive for earwax .  RESP: NEGATIVE for significant cough or SOB    OBJECTIVE:                                                    /86  Pulse 80  Wt 140 lb (63.5 kg)  BMI 23.5 kg/m2  Body mass index is 23.5 kg/(m^2).   GENERAL: healthy, alert, well nourished, well hydrated, no distress  HENT: ear canals- normal; TMs- normal; Nose- normal; Mouth- no ulcers, no lesions  RESP: lungs clear to auscultation - no rales, no rhonchi, no wheezes  Right tympanic membrane is not visible wax impacted, left tympanic membrane is normal no impacted       ASSESSMENT/PLAN:                                                        ICD-10-CM    1. Impacted cerumen of right ear H61.21        Patient has symptoms of impacted wax on the right ear.  Most likely her symptoms of plugged feeling are due to ear wax.  Earwax was removed using warm water flushes.  Patient tolerated very well and she noticed significant improvement after cleaning.  She is advised to keep it clean and follow-up as needed.    Angelo Cruz MD  [facility] [place] [facility]

## 2018-09-12 NOTE — MR AVS SNAPSHOT
After Visit Summary   9/12/2018    Amira Arreola    MRN: [id number]           Patient Information     Date Of Birth          7/17/1932        Visit Information        Provider Department      9/12/2018 9:30 AM  INFUSION CHAIR 2 [facility]        Today's Diagnoses     Multiple myeloma not having achieved remission (H)    -  1    Paroxysmal atrial fibrillation (H)        Portacath in place           Follow-ups after your visit        Follow-up notes from your care team     Return in about 2 weeks (around 9/26/2018).      Your next 10 appointments already scheduled     Sep 12, 2018 12:40 PM CDT   Office Visit with Angelo rCuz MD   [facility] [place] [id number] [phone]           Bring a current list of meds and any records pertaining to this visit. For Physicals, please bring immunization records and any forms needing to be filled out. Please arrive 10 minutes early to complete paperwork.            Sep 26, 2018  9:30 AM CDT   Level 2 with  INFUSION CHAIR 7   [facility] ([facility])    [facility]  [address]  [facility] [address]   [phone]            Oct 10, 2018  9:30 AM CDT   Level 2 with  INFUSION CHAIR 13   [facility] ([facility])    [facility]  [address]  [place] [address]   [phone]            Oct 10, 2018 10:40 AM CDT   Return Visit with Shayne Roberts MD   [facility] ([facility])    [facility]  [address]  [facility] [address]   [phone]              Who to contact     If you have questions or need follow up information about today's clinic visit or your schedule please contact [facility]  [facility]CENTER directly at [phone].  Normal or non-critical lab and imaging results will be communicated to you by MyChart, letter or phone within 4 business days after the clinic has received the results. If you do not hear from us within 7 days, please contact the clinic through MyChart or phone. If you have a critical or abnormal lab result, we will notify you by phone as soon as possible.  Submit refill requests through [employer] or call your pharmacy and they will forward the refill request to us. Please allow 3 business days for your refill to be completed.          Additional Information About Your Visit        Care EveryWhere ID     This is your Care EveryWhere ID. This could be used by other organizations to access your [place] medical records  [id number]        Your Vitals Were     Pulse Temperature Respirations Height Pulse Oximetry BMI (Body Mass Index)    72 97.7  F (36.5  C) (Oral) 14 1.644 m (5' 4.72\") 98% 23.5 kg/m2       Blood Pressure from Last 3 Encounters:   09/12/18 152/86   09/12/18 152/86   08/29/18 163/80    Weight from Last 3 Encounters:   09/12/18 63.5 kg (140 lb)   09/12/18 63.5 kg (140 lb)   08/29/18 63.6 kg (140 lb 3.2 oz)              We Performed the Following     CBC with platelets differential     INR          Where to get your medicines      Some of these will need a paper prescription and others can be bought over the counter.  Ask your nurse if you have questions.     Bring a paper prescription for each of these medications     LORazepam 0.5 MG tablet    oxyCODONE IR 15 MG tablet          Primary Care Provider Office Phone # Fax #    Addy Frias [phone] [address]  [place] [address]        Equal Access to Services     SARA ZELAYA AH: Gissel Galloway, watanya gutierrez, qaybta kaalkelsea gupta, hung sadler. So [facility] [phone].    ATENCIÓN: Si habla español, tiene a barlow disposición servicios " marquita de asistencia lingüística. Kadie eller [phone].    We comply with applicable federal civil rights laws and [place] laws. We do not discriminate on the basis of race, color, national origin, age, disability, sex, sexual orientation, or gender identity.            Thank you!     Thank you for choosing [facility]  for your care. Our goal is always to provide you with excellent care. Hearing back from our patients is one way we can continue to improve our services. Please take a few minutes to complete the written survey that you may receive in the mail after your visit with us. Thank you!             Your Updated Medication List - Protect others around you: Learn how to safely use, store and throw away your medicines at www.disposemymeds.org.          This list is accurate as of 9/12/18 11:22 AM.  Always use your most recent med list.                   Brand Name Dispense Instructions for use Diagnosis    ACYCLOVIR PO      Take 400 mg by mouth 2 times daily        BENADRYL PO      Take 25 mg by mouth nightly as needed        CALCIUM CITRATE + PO      Take 2,000 mg by mouth daily 2 tabs        carboxymethylcellulose 0.5 % Soln ophthalmic solution    REFRESH PLUS     1 drop 4 times daily        CLARINEX PO      Take by mouth daily Taking claritin        COMPAZINE PO      Take 10 mg by mouth daily as needed        cycloSPORINE 0.05 % ophthalmic emulsion    RESTASIS     Place 1 drop into both eyes every 12 hours        DAILY MULTIVITAMIN PO      Take 1 tablet by mouth daily.    Routine general medical examination at a health care facility       * dexamethasone 4 MG tablet    DECADRON    28 tablet    Take 20mg (5 tablets) by mouth every week.    Multiple myeloma not having achieved remission (H)       * dexamethasone 4 MG tablet    DECADRON    28 tablet    Take 20mg (5 tablets) by mouth every week.    Multiple myeloma not having achieved remission (H)       lidocaine-prilocaine  cream    EMLA    30 g    Apply to port site 1 hour prior to access    Multiple myeloma not having achieved remission (H)       LORazepam 0.5 MG tablet    ATIVAN    30 tablet    Take 1 tablet (0.5 mg) by mouth every 8 hours as needed for anxiety    Multiple myeloma not having achieved remission (H)       metoprolol succinate 50 MG 24 hr tablet    TOPROL-XL    270 tablet    TAKE 2 TABLETS BY MOUTH EVERY MORNING, AND 1 TABLET EVERY EVENING    Paroxysmal atrial fibrillation (H)       oxyCODONE IR 15 MG tablet    ROXICODONE    90 tablet    Take 1 tablet (15 mg) by mouth every 8 hours as needed for pain maximum 4 tablet(s) per day    Multiple myeloma not having achieved remission (H)       polyethylene glycol powder    MIRALAX/GLYCOLAX     Take 1 capful by mouth daily as needed    Bilateral leg edema       REFRESH OP           SIMBRINZA 1-0.2 % ophthalmic suspension   Generic drug:  brinzolamide-brimonidine      Place 1 drop into the right eye 2 times daily 1 drop AM and PM        triamcinolone 0.5 % cream    KENALOG    15 g    Apply sparingly to affected area three times daily. 1-2 weeks , as needed    Rash and nonspecific skin eruption       TYLENOL PO      Take 500 mg by mouth every 6 hours as needed for mild pain or fever        UNABLE TO FIND      MEDICATION NAME: Fresh Coat eye drops        VITAMIN D3 PO      Take 1,000 Units by mouth daily        warfarin 4 MG tablet    COUMADIN    110 tablet    TAKE 1-2 TABLETS BY MOUTH EVERY DAY AS DIRECTED BY INR CLINIC    Paroxysmal atrial fibrillation (H), Long-term (current) use of anticoagulants       ZOMETA IV      Inject into the vein every 30 days Every 3 month dosing        * Notice:  This list has 2 medication(s) that are the same as other medications prescribed for you. Read the directions carefully, and ask your doctor or other care provider to review them with you.

## 2018-09-12 NOTE — PROGRESS NOTES
ANTICOAGULATION FOLLOW-UP CLINIC VISIT    Patient Name:  Amira Arreola  Date:  9/12/2018  Contact Type:  Telephone    SUBJECTIVE:     Patient Findings     Positives No Problem Findings    Comments Left detailed VM for patient with dosing instructions - increased weekly dose by 6.6% - recheck 1 week  Advised in VM she call clinic if missed doses, diet changes, med changes, s/s of clot, etc           OBJECTIVE    INR   Date Value Ref Range Status   09/12/2018 1.64 (H) 0.86 - 1.14 Final       ASSESSMENT / PLAN  INR assessment SUB    Recheck INR In: 1 WEEK    INR Location Outside lab      Anticoagulation Summary as of 9/12/2018     INR goal 2.0-3.0   Today's INR 1.64!   Warfarin maintenance plan 4 mg (4 mg x 1) every day   Full warfarin instructions 9/12: 6 mg; Otherwise 4 mg every day   Weekly warfarin total 28 mg   Plan last modified Bri Mcbride RN (6/7/2018)   Next INR check 9/19/2018   Target end date Indefinite    Indications   Long-term (current) use of anticoagulants [Z79.01] [Z79.01]  Atrial fibrillation (H) [I48.91] (Resolved) [I48.91]         Anticoagulation Episode Summary     INR check location     Preferred lab     Send INR reminders to [facility] ANTICOAGULATION    Comments patient have standing INR order to be draw at infusion visit.  advise to call [facility] when have INR draw for dosing instruction.  patient can be reach at home phone [phone]      Anticoagulation Care Providers     Provider Role Specialty Phone number    Addy Frias MD [facility] Internal Medicine [phone]            See the Encounter Report to view Anticoagulation Flowsheet and Dosing Calendar (Go to Encounters tab in chart review, and find the Anticoagulation Therapy Visit)    Dosage adjustment made based on physician directed care plan.  See above - left detailed VM for pt with instructions\    Michelle Pinon RN

## 2018-09-12 NOTE — PROGRESS NOTES
Infusion Nursing Note:  Amira Arreola presents today for labs and Velcade.    Patient seen by provider today: Yes: Dr Roberts   present during visit today: Not Applicable.    Note: Sl more tired and anxious this week .    Intravenous Access:  Labs drawn without difficulty.  Implanted Port.    Treatment Conditions:  Lab Results   Component Value Date    HGB 12.0 09/12/2018     Lab Results   Component Value Date    WBC 7.1 09/12/2018      Lab Results   Component Value Date    ANEU 6.5 09/12/2018     Lab Results   Component Value Date     09/12/2018      Lab Results   Component Value Date     06/13/2017                   Lab Results   Component Value Date    POTASSIUM 4.0 06/13/2017           No results found for: MAG         Lab Results   Component Value Date    CR 0.71 07/25/2018                   Lab Results   Component Value Date    BRADLEY 10.0 07/25/2018                Lab Results   Component Value Date    BILITOTAL 0.6 08/29/2018           Lab Results   Component Value Date    ALBUMIN 3.6 08/29/2018                    Lab Results   Component Value Date    ALT 26 08/29/2018           Lab Results   Component Value Date    AST 23 08/29/2018       Results reviewed, labs MET treatment parameters, ok to proceed with treatment.  Pt aware of her INR results and will be in contact with the INR clinic.     Post Infusion Assessment:  Patient tolerated injection without incident.  Site patent and intact, free from redness, edema or discomfort.  No evidence of extravasations.  Access discontinued per protocol.    Discharge Plan:   Discharge instructions reviewed with: Patient and Family.  Patient and/or family verbalized understanding of discharge instructions and all questions answered.  Copy of AVS reviewed with patient and/or family.  Patient will return 9/26/2018 for next appointment.  Patient discharged in stable condition accompanied by: self and daughter.  Departure Mode: Ambulatory.    Thelma ROBERSON  SHELLIE Acosta

## 2018-09-12 NOTE — Clinical Note
"    9/12/2018         RE: Amira Arreola  [address]  [place] [address]        Dear Colleague,    Thank you for referring your patient, Amira Arreola, to the [facility]. Please see a copy of my visit note below.    Oncology Rooming Note    September 12, 2018 10:09 AM   Amira Arreola is a 86 year old female who presents for:    Chief Complaint   Patient presents with     Oncology Clinic Visit     Multiple myeloma not having achieved remission      Initial Vitals: /86  Pulse 72  Temp 97.7  F (36.5  C) (Oral)  Resp 14  Wt 63.5 kg (140 lb)  SpO2 98%  BMI 23.5 kg/m2 Estimated body mass index is 23.5 kg/(m^2) as calculated from the following:    Height as of 8/29/18: 1.644 m (5' 4.72\").    Weight as of this encounter: 63.5 kg (140 lb). Body surface area is 1.7 meters squared.  No Pain (0) Comment: Data Unavailable   No LMP recorded. Patient is postmenopausal.  Allergies reviewed: Yes  Medications reviewed: Yes    Medications:  MEDICATION REFILL NEEDED ON ATIVAN AND OXYCODONE  Pharmacy [facility] entered into [employer]: [facility] [id number] [place] [address], [place] - [address] [facility] [address] [facility] [address]    Clinical concerns: None                 4 minutes for nursing intake (face to face time)     Zora Bentley MA              Visit Date:   09/12/2018      SUBJECTIVE:  Ms. Arreola is an 86-year-old female with kappa free light chain multiple myeloma.  She is on daratumumab, Velcade and dexamethasone.  She is tolerating it well.  She has responded well.  For convenience, we changed the Velcade to every other week.  Since we have done that, her kappa free light chain has increased slightly.      Overall, her condition is the same.  She has some fatigue.  She has chronic back pain.  She is on oxycodone, which helps.  She wants a refill.      No headache.  No dizziness.  No ear pain, sore throat.  No neck pain.  No chest pain.  No nausea or vomiting.  Appetite fairly good.  No urinary " or bowel complaints.  No bleeding.      PHYSICAL EXAMINATION:  Unchanged.      LABORATORY DATA:  Reviewed.      ASSESSMENT:   1.  An 86-year-old female with kappa free light chain multiple myeloma.   2.  Chronic upper back pain, which is stable.   3.  Fatigue secondary to old age, myeloma and anemia.   4.  Peripheral neuropathy, which is stable.      PLAN:   1.  I discussed with her regarding myeloma.  Labs were all reviewed.  I explained to her that her kappa free light chain had just increased slightly.  In , it was low at 1.46.  Now it is 2.71.  It is still very low.  Highest kappa light chain was about 60.     -- At this time, will continue her on Velcade with dexamethasone and daratumumab.  She is tolerating it well. If her kappa free light chain increases significantly, I will change the Velcade to weekly.  The patient is agreeable for this plan.     2.  The patient will continue on oxycodone for pain.  Prescription refilled.   3.  The patient gets Zometa every 3 months, which will be continued.  She is tolerating it well.  She is not having any jaw or dental problem.   4.  She had a few questions, which were all answered.  I will see her in 1 month for followup.  The patient is on anticoagulation.  INR is subtherapeutic.  She will talk to the anticoagulation clinic today.         ITZ LOPEZ MD             D: 2018   T: 2018   MT:       Name:     ALBERTO PARSON   MRN:      [id number]        Account:      [id number]   :      1932           Visit Date:   2018      Document: [id number]       Again, thank you for allowing me to participate in the care of your patient.        Sincerely,        Itz Lopze MD

## 2018-09-12 NOTE — MR AVS SNAPSHOT
After Visit Summary   9/12/2018    Amira Arreola    MRN: [id number]           Patient Information     Date Of Birth          7/17/1932        Visit Information        Provider Department      9/12/2018 10:00 AM Shayne Roberts MD [facility]        Today's Diagnoses     Multiple myeloma not having achieved remission (H)    -  1      Care Instructions    Continue chemotherapy.  Inform anti-coagulation clinic of INR.  Follow up in 1 month.    The patient is in [facility]          Follow-ups after your visit        Your next 10 appointments already scheduled     Sep 12, 2018 12:40 PM CDT   Office Visit with Angelo Cruz MD   [facility] ([facility])    [address] [place] [id number] [phone]           Bring a current list of meds and any records pertaining to this visit. For Physicals, please bring immunization records and any forms needing to be filled out. Please arrive 10 minutes early to complete paperwork.            Sep 26, 2018  9:30 AM CDT   Level 2 with  INFUSION CHAIR 7   [facility] ([facility])    [facility]  [address]  [place] [address]   [phone]            Oct 10, 2018  9:30 AM CDT   Level 2 with  INFUSION CHAIR 13   [facility] ([facility])    [facility]  [address]  [place] [address] [phone]            Oct 10, 2018 10:40 AM CDT   Return Visit with Shayne Roberts MD   [facility] ([facility])    [facility]  [address]  [place] [address]   [phone]              Who to contact     If you have questions or need follow up information about today's clinic visit or your schedule please contact [facility] directly at [phone].  Normal or  non-critical lab and imaging results will be communicated to you by MyChart, letter or phone within 4 business days after the clinic has received the results. If you do not hear from us within 7 days, please contact the clinic through MyChart or phone. If you have a critical or abnormal lab result, we will notify you by phone as soon as possible.  Submit refill requests through MyWebzz or call your pharmacy and they will forward the refill request to us. Please allow 3 business days for your refill to be completed.          Additional Information About Your Visit        Care EveryWhere ID     This is your Care EveryWhere ID. This could be used by other organizations to access your [place] medical records  [id number]        Your Vitals Were     Pulse Temperature Respirations Pulse Oximetry BMI (Body Mass Index)       72 97.7  F (36.5  C) (Oral) 14 98% 23.5 kg/m2        Blood Pressure from Last 3 Encounters:   09/12/18 152/86   09/12/18 152/86   08/29/18 163/80    Weight from Last 3 Encounters:   09/12/18 63.5 kg (140 lb)   09/12/18 63.5 kg (140 lb)   08/29/18 63.6 kg (140 lb 3.2 oz)              Today, you had the following     No orders found for display         Where to get your medicines      Some of these will need a paper prescription and others can be bought over the counter.  Ask your nurse if you have questions.     Bring a paper prescription for each of these medications     LORazepam 0.5 MG tablet    oxyCODONE IR 15 MG tablet         Information about OPIOIDS     PRESCRIPTION OPIOIDS: WHAT YOU NEED TO KNOW   We gave you an opioid (narcotic) pain medicine. It is important to manage your pain, but opioids are not always the best choice. You should first try all the other options your care team gave you. Take this medicine for as short a time (and as few doses) as possible.    Some activities can increase your pain, such as bandage changes or therapy sessions. It may help to take your pain medicine 30 to 60  minutes before these activities. Reduce your stress by getting enough sleep, working on hobbies you enjoy and practicing relaxation or meditation. Talk to your care team about ways to manage your pain beyond prescription opioids.    These medicines have risks:    DO NOT drive when on new or higher doses of pain medicine. These medicines can affect your alertness and reaction times, and you could be arrested for driving under the influence (DUI). If you need to use opioids long-term, talk to your care team about driving.    DO NOT operate heavy machinery    DO NOT do any other dangerous activities while taking these medicines.    DO NOT drink any alcohol while taking these medicines.     If the opioid prescribed includes acetaminophen, DO NOT take with any other medicines that contain acetaminophen. Read all labels carefully. Look for the word  acetaminophen  or  Tylenol.  Ask your pharmacist if you have questions or are unsure.    You can get addicted to pain medicines, especially if you have a history of addiction (chemical, alcohol or substance dependence). Talk to your care team about ways to reduce this risk.    All opioids tend to cause constipation. Drink plenty of water and eat foods that have a lot of fiber, such as fruits, vegetables, prune juice, apple juice and high-fiber cereal. Take a laxative (Miralax, milk of magnesia, Colace, Senna) if you don t move your bowels at least every other day. Other side effects include upset stomach, sleepiness, dizziness, throwing up, tolerance (needing more of the medicine to have the same effect), physical dependence and slowed breathing.    Store your pills in a secure place, locked if possible. We will not replace any lost or stolen medicine. If you don t finish your medicine, please throw away (dispose) as directed by your pharmacist. The [place] Pollution Control Agency has more information about safe disposal:  https://www.pca.[facility].[place].us/living-green/managing-unwanted-medications         Primary Care Provider Office Phone # Fax #    Addy Frias [phone] [id number] [place]  [facility] [id number]        Equal Access to Services     MICHAELHARINI GARCIA : Hadii aad ku hadasho Soomaali, waaxda luqadaha, qaybta kaalmada adeegyada, waxay genoin erinn adesergio diego laterimorteza doroteo. So [facility] [phone].    ATENCIÓN: Si habla español, tiene a barlow disposición servicios gratuitos de asistencia lingüística. Llame al [phone].    We comply with applicable federal civil rights laws and [place] laws. We do not discriminate on the basis of race, color, national origin, age, disability, sex, sexual orientation, or gender identity.            Thank you!     Thank you for choosing [facility]  for your care. Our goal is always to provide you with excellent care. Hearing back from our patients is one way we can continue to improve our services. Please take a few minutes to complete the written survey that you may receive in the mail after your visit with us. Thank you!             Your Updated Medication List - Protect others around you: Learn how to safely use, store and throw away your medicines at www.disposemymeds.org.          This list is accurate as of 9/12/18 11:15 AM.  Always use your most recent med list.                   Brand Name Dispense Instructions for use Diagnosis    ACYCLOVIR PO      Take 400 mg by mouth 2 times daily        BENADRYL PO      Take 25 mg by mouth nightly as needed        CALCIUM CITRATE + PO      Take 2,000 mg by mouth daily 2 tabs        carboxymethylcellulose 0.5 % Soln ophthalmic solution    REFRESH PLUS     1 drop 4 times daily        CLARINEX PO      Take by mouth daily Taking claritin        COMPAZINE PO      Take 10 mg by mouth daily as needed        cycloSPORINE 0.05 % ophthalmic emulsion    RESTASIS     Place 1 drop into both eyes every 12 hours        DAILY  MULTIVITAMIN PO      Take 1 tablet by mouth daily.    Routine general medical examination at a health care facility       * dexamethasone 4 MG tablet    DECADRON    28 tablet    Take 20mg (5 tablets) by mouth every week.    Multiple myeloma not having achieved remission (H)       * dexamethasone 4 MG tablet    DECADRON    28 tablet    Take 20mg (5 tablets) by mouth every week.    Multiple myeloma not having achieved remission (H)       lidocaine-prilocaine cream    EMLA    30 g    Apply to port site 1 hour prior to access    Multiple myeloma not having achieved remission (H)       LORazepam 0.5 MG tablet    ATIVAN    30 tablet    Take 1 tablet (0.5 mg) by mouth every 8 hours as needed for anxiety    Multiple myeloma not having achieved remission (H)       metoprolol succinate 50 MG 24 hr tablet    TOPROL-XL    270 tablet    TAKE 2 TABLETS BY MOUTH EVERY MORNING, AND 1 TABLET EVERY EVENING    Paroxysmal atrial fibrillation (H)       oxyCODONE IR 15 MG tablet    ROXICODONE    90 tablet    Take 1 tablet (15 mg) by mouth every 8 hours as needed for pain maximum 4 tablet(s) per day    Multiple myeloma not having achieved remission (H)       polyethylene glycol powder    MIRALAX/GLYCOLAX     Take 1 capful by mouth daily as needed    Bilateral leg edema       REFRESH OP           SIMBRINZA 1-0.2 % ophthalmic suspension   Generic drug:  brinzolamide-brimonidine      Place 1 drop into the right eye 2 times daily 1 drop AM and PM        triamcinolone 0.5 % cream    KENALOG    15 g    Apply sparingly to affected area three times daily. 1-2 weeks , as needed    Rash and nonspecific skin eruption       TYLENOL PO      Take 500 mg by mouth every 6 hours as needed for mild pain or fever        UNABLE TO FIND      MEDICATION NAME: Fresh Coat eye drops        VITAMIN D3 PO      Take 1,000 Units by mouth daily        warfarin 4 MG tablet    COUMADIN    110 tablet    TAKE 1-2 TABLETS BY MOUTH EVERY DAY AS DIRECTED BY INR CLINIC     Paroxysmal atrial fibrillation (H), Long-term (current) use of anticoagulants       ZOMETA IV      Inject into the vein every 30 days Every 3 month dosing        * Notice:  This list has 2 medication(s) that are the same as other medications prescribed for you. Read the directions carefully, and ask your doctor or other care provider to review them with you.

## 2018-09-12 NOTE — PROGRESS NOTES
"Oncology Rooming Note    September 12, 2018 10:09 AM   Amira Arreola is a 86 year old female who presents for:    Chief Complaint   Patient presents with     Oncology Clinic Visit     Multiple myeloma not having achieved remission      Initial Vitals: /86  Pulse 72  Temp 97.7  F (36.5  C) (Oral)  Resp 14  Wt 63.5 kg (140 lb)  SpO2 98%  BMI 23.5 kg/m2 Estimated body mass index is 23.5 kg/(m^2) as calculated from the following:    Height as of 8/29/18: 1.644 m (5' 4.72\").    Weight as of this encounter: 63.5 kg (140 lb). Body surface area is 1.7 meters squared.  No Pain (0) Comment: Data Unavailable   No LMP recorded. Patient is postmenopausal.  Allergies reviewed: Yes  Medications reviewed: Yes    Medications:  MEDICATION REFILL NEEDED ON ATIVAN AND OXYCODONE  Pharmacy [facility] entered into [facility]: [facility] [address] [place] [address] 7 AT [facility] [place] [place] [address]    Clinical concerns: None                 4 minutes for nursing intake (face to face time)     Zora Bentley MA            "

## 2018-09-13 NOTE — PROGRESS NOTES
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     1. On 09/21/2015, WBC of 4.2, hemoglobin of 13.2 and platelets of 138.    -On 09/29/2015, SPEP does not reveal any M-spike.   -On 10/02/2015, JANET does not reveal any monoclonal protein.     -On 10/22/2015, urine immunofixation reveals monoclonal free kappa light chain.    2. On 05/11/2016, kappa light chain of 50, lambda light chain of 0.32 and ratio of kappa to lambda of 156.2.  3. Bone marrow biopsy on 05/25/2016 reveals 40-50% kappa light chain restricted plasma cells.  Cytogenetics is normal. FISH panel reveals translocation 11;14.    4. MRI of bones on 06/21/2016 and 06/22/2016 reveals myeloma lesions.  5. On 08/24/2016, she was started on revlimid with dexamethasone 20 mg weekly. She did not have any significant response to treatment.   6. Velcade and dexamethasone started on 03/21/2017.    7. Daratumumab added to velcade and dexamethasone on 05/31/2017.   -Velcade given every 14 days starting 08/01/2018.     SUBJECTIVE:  Mrs. Arreola is an 86-year-old female with kappa free light chain multiple myeloma.  She is on daratumumab, Velcade and dexamethasone.  She is tolerating it well.  She has responded well.  For convenience, we changed the Velcade to every other week.  Since we have done that, her kappa free light chain has increased slightly.      Overall, her condition is the same.  She has some fatigue.  She has chronic back pain.  She is on oxycodone, which helps.  She wants a refill.      No headache.  No dizziness.  No ear pain or sore throat.  No neck pain.  No chest pain.  No nausea or vomiting.  Appetite is fairly good.  No urinary or bowel complaints.  No bleeding.      PHYSICAL EXAMINATION:   Alert and oriented x 3. ECOG PS of 2.  EYES:  No icterus.   THROAT:  No ulcer or thrush.   NECK:  Supple. No lymphadenopathy.   AXILLAE:  No lymphadenopathy.   LUNGS:  Good air  entry bilaterally.  No crackles or wheezing.   HEART:  Regular.  No murmur.   ABDOMEN:  Soft and nontender.  No mass.   EXTREMITIES: Bilateral pedal edema. No calf swelling or tenderness.   SKIN: No petechia.      LABORATORY DATA:  Reviewed.      ASSESSMENT:   1.  An 86-year-old female with kappa free light chain multiple myeloma.   2.  Chronic upper back pain, which is stable.   3.  Fatigue secondary to old age, myeloma and anemia.   4.  Peripheral neuropathy, which is stable.      PLAN:   1.  I discussed with her regarding myeloma.  Labs were all reviewed.  I explained to her that her kappa free light chain had just increased slightly.  In , it was low at 1.46.  Now it is 2.71.  It is still very low.  Highest kappa light chain was about 60.     At this time, will continue her on Velcade with dexamethasone and daratumumab.  She is tolerating it well. If her kappa free light chain increases significantly, I will change the Velcade to weekly. Patient is agreeable for this plan.     2.  Patient will continue on oxycodone for pain.  Prescription refilled.   3.  Patient gets Zometa every 3 months, which will be continued.  She is tolerating it well.  She is not having any jaw or dental problem.   4.  She had a few questions, which were all answered.  I will see her in 1 month for followup. Patient is on anticoagulation.  INR is subtherapeutic.  She will talk to the anticoagulation clinic today.         ITZ LOPEZ MD             D: 2018   T: 2018   MT:       Name:     ALBERTO PARSON   MRN:      [id number]        Account:      [id number]   :      1932           Visit Date:   2018      Document: [id number]

## 2018-09-17 DIAGNOSIS — I48.0 PAROXYSMAL ATRIAL FIBRILLATION (H): ICD-10-CM

## 2018-09-17 NOTE — TELEPHONE ENCOUNTER
"Requested Prescriptions   Pending Prescriptions Disp Refills     metoprolol succinate (TOPROL-XL) 50 MG 24 hr tablet [Pharmacy Med Name: METOPROLOL ER SUCCINATE 50MG TABS] 270 tablet 0     Sig: TAKE 2 TABLETS BY MOUTH EVERY MORNING, AND 1 TABLET EVERY EVENING    Beta-Blockers Protocol Failed    9/17/2018  3:55 AM       Failed - Blood pressure under 140/90 in past 12 months    BP Readings from Last 3 Encounters:   09/12/18 154/86   09/12/18 152/86   09/12/18 152/86                Passed - Patient is age 6 or older       Passed - Recent (12 mo) or future (30 days) visit within the authorizing provider's specialty    Patient had office visit in the last 12 months or has a visit in the next 30 days with authorizing provider or within the authorizing provider's specialty.  See \"Patient Info\" tab in inbasket, or \"Choose Columns\" in Meds & Orders section of the refill encounter.            metoprolol succinate (TOPROL-XL) 50 MG 24 hr tablet 270 tablet 1 3/14/2018       Last Written Prescription Date:  03/14/2018  Last Fill Quantity: 270,  # refills: 1   Last office visit: 9/12/2018 with prescribing provider:  Dr. timmons   Future Office Visit: 10/10/2018  Next 5 appointments (look out 90 days)     Oct 10, 2018 10:40 AM CDT   Return Visit with Shayne Roberts MD   [facility] ([facility])    [facility]  [address]  [facility] [address] [phone]                   "

## 2018-09-18 RX ORDER — METOPROLOL SUCCINATE 50 MG/1
TABLET, EXTENDED RELEASE ORAL
Qty: 270 TABLET | Refills: 0 | Status: SHIPPED | OUTPATIENT
Start: 2018-09-18 | End: 2018-12-22

## 2018-09-18 NOTE — TELEPHONE ENCOUNTER
BP Readings from Last 3 Encounters:   09/12/18 154/86   09/12/18 152/86   09/12/18 152/86   Will route to PCP to review

## 2018-09-19 ENCOUNTER — RADIANT APPOINTMENT (OUTPATIENT)
Dept: GENERAL RADIOLOGY | Facility: CLINIC | Age: 83
End: 2018-09-19
Attending: PHYSICIAN ASSISTANT
Payer: COMMERCIAL

## 2018-09-19 ENCOUNTER — OFFICE VISIT (OUTPATIENT)
Dept: FAMILY MEDICINE | Facility: CLINIC | Age: 83
End: 2018-09-19
Payer: COMMERCIAL

## 2018-09-19 VITALS
SYSTOLIC BLOOD PRESSURE: 136 MMHG | DIASTOLIC BLOOD PRESSURE: 84 MMHG | OXYGEN SATURATION: 97 % | HEIGHT: 65 IN | TEMPERATURE: 97 F | HEART RATE: 77 BPM | WEIGHT: 134.2 LBS | BODY MASS INDEX: 22.36 KG/M2

## 2018-09-19 DIAGNOSIS — R07.81 RIB PAIN ON LEFT SIDE: ICD-10-CM

## 2018-09-19 DIAGNOSIS — C90.00 MULTIPLE MYELOMA NOT HAVING ACHIEVED REMISSION (H): ICD-10-CM

## 2018-09-19 DIAGNOSIS — Z23 NEED FOR PROPHYLACTIC VACCINATION AND INOCULATION AGAINST INFLUENZA: ICD-10-CM
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
## 2018-09-19 DIAGNOSIS — R07.81 RIB PAIN ON LEFT SIDE: Primary | ICD-10-CM

## 2018-09-19 PROCEDURE — 71101 X-RAY EXAM UNILAT RIBS/CHEST: CPT | Mod: LT

## 2018-09-19 PROCEDURE — G0008 ADMIN INFLUENZA VIRUS VAC: HCPCS | Performed by: PHYSICIAN ASSISTANT

## 2018-09-19 PROCEDURE — 90662 IIV NO PRSV INCREASED AG IM: CPT | Performed by: PHYSICIAN ASSISTANT

## 2018-09-19 PROCEDURE — 99214 OFFICE O/P EST MOD 30 MIN: CPT | Mod: 25 | Performed by: PHYSICIAN ASSISTANT

## 2018-09-19 NOTE — PROGRESS NOTES
HPI:  Amira is a pleasant 85 yo female with multiple myeloma here with L sided rib pain  States she fell in her kitchen yesterday and that is when the pain started  She has pain in her L ribs keesha with trying to get out of bed or change positions.   Once she is seated she is comfortable.  She had pain during the night and this morning needed help sitting up in bed  She had taken some compazine prior to the fall but her nausea is a chronic issue  This happened after she ate breakfast  She lost her balance putting something in the fridge and fell onto her L side  Her  and son were there and helped her get up  She doesn't think she hit her head and denies LOC  She has an abrasion on her L forearm from the fall that is no longer bleeding.  She takes oxycodone at bedtime for her chronic back pain    Past Medical History:   Diagnosis Date     Abnormal CXR 2018    then ct done and not significant     Ascending aorta dilatation (H) 04/2016    on echo, mild, fu 7/18 4.0, slightly larger     Cancer, metastatic to bone (H)     due to myeloma     Colonic polyp 2008    adenomatous, fu 2013 tics only     Compression fracture 2016    multiple areas of spine     Dry eyes      Elevated MCV 2015    b12 and folic acid nl     HTN (hypertension) 2000    off meds for years     Lung nodule 08/2018    on ct, 4mm, ct done for fu abnl cxr     Menorrhagia 2002    hysteroscopy and d and c done     MGUS (monoclonal gammopathy of unknown significance) 2015    eval by Dr. Roberts     Multiple myeloma (H) 2016    dx 5/16 at [place], bone lesions seen on mri 6/16     OAB (overactive bladder) 2013    Dr. Grullon     Osteoporosis     fu done 2010 and stable, went off meds then, fu done 2013; has had gyn fu and added evista 2013 by gyn     Palpitations 4/16    nl echo, mildly dilated asc aorta     Paroxysmal atrial fibrillation (H) 4/16    had palp and ziopatch showed it, echo nl lv fxn, mild mr and tr, added coum and toprol, toprol dose raised  16     Sciatica of left side     Dr. Helen Ricardo      SVT (supraventricular tachycardia) (H)     on ziopatch     Thrombocytopenia (H)      Past Surgical History:   Procedure Laterality Date     BONE MARROW BIOPSY, BONE SPECIMEN, NEEDLE/TROCAR N/A 2016    Procedure: BIOPSY BONE MARROW;  Surgeon: Bryan Patel MD;  Location:  GI     CATARACT IOL, RT/LT        SECTION  1965, 1966     COLONOSCOPY  2013    Procedure: COLONOSCOPY;  COLONOSCOPY;  Surgeon: Steffany Rockwell MD;  Location:  GI     EXCISE EXOSTOSIS TIBIA / FIBULA  2014    Procedure: EXCISE EXOSTOSIS TIBIA / FIBULA;  Surgeon: Naila Pichardo MD;  Location:  SD     hysteroscopy and d and c      due to bleeding     left anle replacement       right ankle surgery       Social History   Substance Use Topics     Smoking status: Never Smoker     Smokeless tobacco: Never Used     Alcohol use No     Current Outpatient Prescriptions   Medication Sig Dispense Refill     Acetaminophen (TYLENOL PO) Take 500 mg by mouth every 6 hours as needed for mild pain or fever       ACYCLOVIR PO Take 400 mg by mouth 2 times daily       Calcium Citrate-Vitamin D (CALCIUM CITRATE + PO) Take 2,000 mg by mouth daily 2 tabs       carboxymethylcellulose (REFRESH PLUS) 0.5 % SOLN 1 drop 4 times daily       Cholecalciferol (VITAMIN D3 PO) Take 1,000 Units by mouth daily       cycloSPORINE (RESTASIS) 0.05 % ophthalmic emulsion Place 1 drop into both eyes every 12 hours        Desloratadine (CLARINEX PO) Take by mouth daily Taking claritin       dexamethasone (DECADRON) 4 MG tablet Take 20mg (5 tablets) by mouth every week. 28 tablet 0     dexamethasone (DECADRON) 4 MG tablet Take 20mg (5 tablets) by mouth every week. 28 tablet 0     DiphenhydrAMINE HCl (BENADRYL PO) Take 25 mg by mouth nightly as needed       lidocaine-prilocaine (EMLA) cream Apply to port site 1 hour prior to access 30 g 1      "LORazepam (ATIVAN) 0.5 MG tablet Take 1 tablet (0.5 mg) by mouth every 8 hours as needed for anxiety 30 tablet 3     metoprolol succinate (TOPROL-XL) 50 MG 24 hr tablet TAKE 2 TABLETS BY MOUTH EVERY MORNING, AND 1 TABLET EVERY EVENING 270 tablet 0     Multiple Vitamin (DAILY MULTIVITAMIN PO) Take 1 tablet by mouth daily.       oxyCODONE IR (ROXICODONE) 15 MG tablet Take 1 tablet (15 mg) by mouth every 8 hours as needed for pain maximum 4 tablet(s) per day 90 tablet 0     polyethylene glycol (MIRALAX/GLYCOLAX) powder Take 1 capful by mouth daily as needed        Polyvinyl Alcohol-Povidone (REFRESH OP)        Prochlorperazine Maleate (COMPAZINE PO) Take 10 mg by mouth daily as needed        SIMBRINZA 1-0.2 % ophthalmic suspension Place 1 drop into the right eye 2 times daily 1 drop AM and PM  2     triamcinolone (KENALOG) 0.5 % cream Apply sparingly to affected area three times daily. 1-2 weeks , as needed 15 g 1     UNABLE TO FIND MEDICATION NAME: Fresh Coat eye drops       warfarin (COUMADIN) 4 MG tablet TAKE 1-2 TABLETS BY MOUTH EVERY DAY AS DIRECTED BY INR CLINIC 110 tablet 0     Zoledronic Acid (ZOMETA IV) Inject into the vein every 30 days Every 3 month dosing       Allergies   Allergen Reactions     Penicillin [Penicillins] Rash     Blotches on chest      FAMILY HISTORY NOTED AND REVIEWED    PHYSICAL EXAM:    /84  Pulse 77  Temp 97  F (36.1  C) (Oral)  Ht 5' 4.75\" (1.645 m)  Wt 134 lb 3.2 oz (60.9 kg)  SpO2 97%  Breastfeeding? No  BMI 22.5 kg/m2    Patient appears non toxic  Lungs: CTA bilat  Heart: RRR without m/r/g  Chest wall: no eccymosis or signs of shingles  +tenderness L lateral lower ribcage    Assessment and Plan:     (R07.81) Rib pain on left side  (primary encounter diagnosis)  Comment: recd lidocaine patch 12 hours on, 12 hours off, tylenol, alt ice and heat packs.  She has oxycodone if needed.  Plan: XR Ribs & Chest Left G/E 3 Views            (W19.XXXA) Fall, initial " encounter  Comment:   Plan: XR Ribs & Chest Left G/E 3 Views            (C90.00) Multiple myeloma not having achieved remission (H)  Comment:   Plan: pt followed by Dr. Roberts and on Velcade with dex and daratumamuab.  She gets zometa Q3mo    (Z23) Need for prophylactic vaccination and inoculation against influenza  Comment:   Plan: FLU VACCINE, INCREASED ANTIGEN, PRESV FREE, AGE        65+ [24820], ADMIN INFLUENZA (For MEDICARE         Patients ONLY) []              Emely Vaughn PA-C

## 2018-09-19 NOTE — PROGRESS NOTES
Injectable Influenza Immunization Documentation    1.  Is the person to be vaccinated sick today?   No    2. Does the person to be vaccinated have an allergy to a component   of the vaccine?   No  Egg Allergy Algorithm Link    3. Has the person to be vaccinated ever had a serious reaction   to influenza vaccine in the past?   No    4. Has the person to be vaccinated ever had Guillain-Barré syndrome?   No    Form completed by patient.    Prior to injection verified patient identity using patient's name and date of birth.  Due to injection administration, patient instructed to remain in clinic for 15 minutes  afterwards, and to report any adverse reaction to me immediately.     Hu Yang, CMA

## 2018-09-19 NOTE — MR AVS SNAPSHOT
After Visit Summary   9/19/2018    Amira Arreola    MRN: [id number]           Patient Information     Date Of Birth          7/17/1932        Visit Information        Provider Department      9/19/2018 3:00 PM Emely Vaughn PA-C [facility] [place]        Today's Diagnoses     Rib pain on left side    -  1    Fall, initial encounter        Multiple myeloma not having achieved remission (H)           Follow-ups after your visit        Your next 10 appointments already scheduled     Sep 26, 2018  9:30 AM CDT   Level 2 with SH INFUSION CHAIR 7   [facility] ([facility])    [facility]  [address]  [place] [address]   [phone]            Oct 10, 2018  9:30 AM CDT   Level 2 with SH INFUSION CHAIR 13   [facility] ([facility])    [facility]  [address]  [place] [address]   [phone]            Oct 10, 2018 10:40 AM CDT   Return Visit with Shayne Roberts MD   [facility] ([facility])    [facility]  [address]  [place] [address]   [phone]              Future tests that were ordered for you today     Open Future Orders        Priority Expected Expires Ordered    XR Ribs & Chest Left G/E 3 Views Routine 9/19/2018 9/19/2019 9/19/2018            Who to contact     If you have questions or need follow up information about today's clinic visit or your schedule please contact [facility] directly at [phone].  Normal or non-critical lab and imaging results will be communicated to you by MyChart, letter or phone within 4 business days after the clinic has received the results. If you do not hear from us within 7 days, please contact the clinic through MyChart or phone. If you have a critical or abnormal lab result, we will notify you by phone as  "soon as possible.  Submit refill requests through Adduplex or call your pharmacy and they will forward the refill request to us. Please allow 3 business days for your refill to be completed.          Additional Information About Your Visit        Care EveryWhere ID     This is your Care EveryWhere ID. This could be used by other organizations to access your [place] medical records  [id number]        Your Vitals Were     Pulse Temperature Height Pulse Oximetry Breastfeeding? BMI (Body Mass Index)    77 97  F (36.1  C) (Oral) 5' 4.75\" (1.645 m) 97% No 22.5 kg/m2       Blood Pressure from Last 3 Encounters:   09/19/18 136/84   09/12/18 154/86   09/12/18 152/86    Weight from Last 3 Encounters:   09/19/18 134 lb 3.2 oz (60.9 kg)   09/12/18 140 lb (63.5 kg)   09/12/18 140 lb (63.5 kg)               Primary Care Provider Office Phone # Fax #    Addy Frias [phone] [address]  [facility] [address]        Equal Access to Services     [facility]: Hadii aad ku hadasho Soomaali, waaxda luqadaha, qaybta kaalmada adeegyada, hung dominique . So [facility] [phone].    ATENCIÓN: Si habla español, tiene a barlow disposición servicios gratuitos de asistencia lingüística. [facility] [phone].    We comply with applicable federal civil rights laws and [place] laws. We do not discriminate on the basis of race, color, national origin, age, disability, sex, sexual orientation, or gender identity.            Thank you!     Thank you for choosing [facility]  for your care. Our goal is always to provide you with excellent care. Hearing back from our patients is one way we can continue to improve our services. Please take a few minutes to complete the written survey that you may receive in the mail after your visit with us. Thank you!             Your Updated Medication List - Protect others around you: Learn how to safely use, store and throw away your " medicines at www.disposemymeds.org.          This list is accurate as of 9/19/18  3:37 PM.  Always use your most recent med list.                   Brand Name Dispense Instructions for use Diagnosis    ACYCLOVIR PO      Take 400 mg by mouth 2 times daily        BENADRYL PO      Take 25 mg by mouth nightly as needed        CALCIUM CITRATE + PO      Take 2,000 mg by mouth daily 2 tabs        carboxymethylcellulose 0.5 % Soln ophthalmic solution    REFRESH PLUS     1 drop 4 times daily        CLARINEX PO      Take by mouth daily Taking claritin        COMPAZINE PO      Take 10 mg by mouth daily as needed        cycloSPORINE 0.05 % ophthalmic emulsion    RESTASIS     Place 1 drop into both eyes every 12 hours        DAILY MULTIVITAMIN PO      Take 1 tablet by mouth daily.    Routine general medical examination at a health care facility       * dexamethasone 4 MG tablet    DECADRON    28 tablet    Take 20mg (5 tablets) by mouth every week.    Multiple myeloma not having achieved remission (H)       * dexamethasone 4 MG tablet    DECADRON    28 tablet    Take 20mg (5 tablets) by mouth every week.    Multiple myeloma not having achieved remission (H)       lidocaine-prilocaine cream    EMLA    30 g    Apply to port site 1 hour prior to access    Multiple myeloma not having achieved remission (H)       LORazepam 0.5 MG tablet    ATIVAN    30 tablet    Take 1 tablet (0.5 mg) by mouth every 8 hours as needed for anxiety    Multiple myeloma not having achieved remission (H)       metoprolol succinate 50 MG 24 hr tablet    TOPROL-XL    270 tablet    TAKE 2 TABLETS BY MOUTH EVERY MORNING, AND 1 TABLET EVERY EVENING    Paroxysmal atrial fibrillation (H)       oxyCODONE IR 15 MG tablet    ROXICODONE    90 tablet    Take 1 tablet (15 mg) by mouth every 8 hours as needed for pain maximum 4 tablet(s) per day    Multiple myeloma not having achieved remission (H)       polyethylene glycol powder    MIRALAX/GLYCOLAX     Take 1 capful by  mouth daily as needed    Bilateral leg edema       REFRESH OP           SIMBRINZA 1-0.2 % ophthalmic suspension   Generic drug:  brinzolamide-brimonidine      Place 1 drop into the right eye 2 times daily 1 drop AM and PM        triamcinolone 0.5 % cream    KENALOG    15 g    Apply sparingly to affected area three times daily. 1-2 weeks , as needed    Rash and nonspecific skin eruption       TYLENOL PO      Take 500 mg by mouth every 6 hours as needed for mild pain or fever        UNABLE TO FIND      MEDICATION NAME: Fresh Coat eye drops        VITAMIN D3 PO      Take 1,000 Units by mouth daily        warfarin 4 MG tablet    COUMADIN    110 tablet    TAKE 1-2 TABLETS BY MOUTH EVERY DAY AS DIRECTED BY INR CLINIC    Paroxysmal atrial fibrillation (H), Long-term (current) use of anticoagulants       ZOMETA IV      Inject into the vein every 30 days Every 3 month dosing        * Notice:  This list has 2 medication(s) that are the same as other medications prescribed for you. Read the directions carefully, and ask your doctor or other care provider to review them with you.

## 2018-09-20 ENCOUNTER — TELEPHONE (OUTPATIENT)
Dept: FAMILY MEDICINE | Facility: CLINIC | Age: 83
End: 2018-09-20

## 2018-09-20 ENCOUNTER — OFFICE VISIT (OUTPATIENT)
Dept: FAMILY MEDICINE | Facility: CLINIC | Age: 83
End: 2018-09-20
Payer: COMMERCIAL

## 2018-09-20 ENCOUNTER — RADIANT APPOINTMENT (OUTPATIENT)
Dept: GENERAL RADIOLOGY | Facility: CLINIC | Age: 83
End: 2018-09-20
Attending: INTERNAL MEDICINE
Payer: COMMERCIAL

## 2018-09-20 VITALS
WEIGHT: 134 LBS | HEIGHT: 65 IN | HEART RATE: 88 BPM | OXYGEN SATURATION: 98 % | SYSTOLIC BLOOD PRESSURE: 118 MMHG | DIASTOLIC BLOOD PRESSURE: 74 MMHG | BODY MASS INDEX: 22.33 KG/M2

## 2018-09-20 DIAGNOSIS — R14.3 FLATULENCE, ERUCTATION, AND GAS PAIN: Primary | ICD-10-CM

## 2018-09-20 DIAGNOSIS — R11.0 NAUSEA: ICD-10-CM

## 2018-09-20 DIAGNOSIS — R14.2 FLATULENCE, ERUCTATION, AND GAS PAIN: Primary | ICD-10-CM

## 2018-09-20 DIAGNOSIS — R14.1 FLATULENCE, ERUCTATION, AND GAS PAIN: Primary | ICD-10-CM

## 2018-09-20 DIAGNOSIS — R14.3 FLATULENCE, ERUCTATION, AND GAS PAIN: ICD-10-CM

## 2018-09-20 DIAGNOSIS — R14.2 FLATULENCE, ERUCTATION, AND GAS PAIN: ICD-10-CM

## 2018-09-20 DIAGNOSIS — R14.1 FLATULENCE, ERUCTATION, AND GAS PAIN: ICD-10-CM

## 2018-09-20 LAB
ALBUMIN SERPL-MCNC: 3.7 G/DL (ref 3.4–5)
ALP SERPL-CCNC: 48 U/L (ref 40–150)
ALT SERPL W P-5'-P-CCNC: 22 U/L (ref 0–50)
ANION GAP SERPL CALCULATED.3IONS-SCNC: 8 MMOL/L (ref 3–14)
AST SERPL W P-5'-P-CCNC: 24 U/L (ref 0–45)
BASOPHILS # BLD AUTO: 0 10E9/L (ref 0–0.2)
BASOPHILS NFR BLD AUTO: 0.1 %
BILIRUB SERPL-MCNC: 1.3 MG/DL (ref 0.2–1.3)
BUN SERPL-MCNC: 16 MG/DL (ref 7–30)
CALCIUM SERPL-MCNC: 9.4 MG/DL (ref 8.5–10.1)
CHLORIDE SERPL-SCNC: 104 MMOL/L (ref 94–109)
CO2 SERPL-SCNC: 24 MMOL/L (ref 20–32)
CREAT SERPL-MCNC: 0.63 MG/DL (ref 0.52–1.04)
DIFFERENTIAL METHOD BLD: ABNORMAL
EOSINOPHIL # BLD AUTO: 0 10E9/L (ref 0–0.7)
EOSINOPHIL NFR BLD AUTO: 0 %
ERYTHROCYTE [DISTWIDTH] IN BLOOD BY AUTOMATED COUNT: 14.6 % (ref 10–15)
GFR SERPL CREATININE-BSD FRML MDRD: 90 ML/MIN/1.7M2
GLUCOSE SERPL-MCNC: 138 MG/DL (ref 70–99)
HCT VFR BLD AUTO: 38.2 % (ref 35–47)
HGB BLD-MCNC: 12.6 G/DL (ref 11.7–15.7)
LYMPHOCYTES # BLD AUTO: 0.4 10E9/L (ref 0.8–5.3)
LYMPHOCYTES NFR BLD AUTO: 3.9 %
MCH RBC QN AUTO: 32.4 PG (ref 26.5–33)
MCHC RBC AUTO-ENTMCNC: 33 G/DL (ref 31.5–36.5)
MCV RBC AUTO: 98 FL (ref 78–100)
MONOCYTES # BLD AUTO: 1.1 10E9/L (ref 0–1.3)
MONOCYTES NFR BLD AUTO: 9.5 %
NEUTROPHILS # BLD AUTO: 9.7 10E9/L (ref 1.6–8.3)
NEUTROPHILS NFR BLD AUTO: 86.5 %
PLATELET # BLD AUTO: 135 10E9/L (ref 150–450)
POTASSIUM SERPL-SCNC: 3.8 MMOL/L (ref 3.4–5.3)
PROT SERPL-MCNC: 6.5 G/DL (ref 6.8–8.8)
RBC # BLD AUTO: 3.89 10E12/L (ref 3.8–5.2)
SODIUM SERPL-SCNC: 136 MMOL/L (ref 133–144)
WBC # BLD AUTO: 11.2 10E9/L (ref 4–11)

## 2018-09-20 PROCEDURE — 36415 COLL VENOUS BLD VENIPUNCTURE: CPT | Performed by: INTERNAL MEDICINE

## 2018-09-20 PROCEDURE — 74019 RADEX ABDOMEN 2 VIEWS: CPT

## 2018-09-20 PROCEDURE — 80053 COMPREHEN METABOLIC PANEL: CPT | Performed by: INTERNAL MEDICINE

## 2018-09-20 PROCEDURE — 99214 OFFICE O/P EST MOD 30 MIN: CPT | Performed by: INTERNAL MEDICINE

## 2018-09-20 PROCEDURE — 85025 COMPLETE CBC W/AUTO DIFF WBC: CPT | Performed by: INTERNAL MEDICINE

## 2018-09-20 NOTE — PATIENT INSTRUCTIONS
Continue the daily stool softener.    Try either prunes or prune juice daiy    Also start miralax and try this daily or less depending on how it works for you.    If this does not work let me know    If your discomfort worsens or you get a fever let me know    Addy Frias M.D.

## 2018-09-20 NOTE — TELEPHONE ENCOUNTER
XR Ribs & Chest Left G/E 3 Views   Status:  Final result   Visible to patient:  No (Not Released) Dx:  Rib pain on left side; Fall, initial ... Order: [id number]       Notes Recorded by Emely Vaughn PA-C on 9/20/2018 at 9:19 AM  Call pt and let her know that her cxr did NOT show any rib fracture.

## 2018-09-20 NOTE — PROGRESS NOTES
The patient is here with her daughter as an urgent work and for GI symptoms.  She has a history of bloating for quite some time but her discomfort worsened last night.  She has had long-term constipation with a normal colon exam in 2013.  Her bowels have not changed.  Her last bowel movement was 2 days ago.  She is not having bloody or black stools but small amounts.  She is felt nauseated for about the last 3 days with no emesis or heartburn.  No dysphagia.  Her appetite's been poor and she has not been hungry.  She has no fevers or night sweats.  As noted, she does have myeloma.  She is on chemo for that.  She takes oxycodone 15 mg once a day for discomfort.  She recently fell in his left lateral rib pain.  She does not have chest pain or shortness of breath or urine symptoms.  No fevers or night sweats.    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 Roberts     Multiple myeloma (H) 2016    dx 5/16 at [place], bone lesions seen on mri 6/16     OAB (overactive bladder) 2013    Dr. Grullon     Osteoporosis     fu done 2010 and stable, went off meds then, fu done 2013; has had gyn fu and added evista 2013 by gyn     Palpitations 4/16    nl echo, mildly dilated asc aorta     Paroxysmal atrial fibrillation (H) 4/16    had palp and ziopatch showed it, echo nl lv fxn, mild mr and tr, added coum and toprol, toprol dose  raised 16     Sciatica of left side     Dr. Helen Ricardo      SVT (supraventricular tachycardia) (H)     on ziopatch     Thrombocytopenia (H)      Past Surgical History:   Procedure Laterality Date     BONE MARROW BIOPSY, BONE SPECIMEN, NEEDLE/TROCAR N/A 2016    Procedure: BIOPSY BONE MARROW;  Surgeon: Bryan Patel MD;  Location:  GI     CATARACT IOL, RT/LT        SECTION  1965, 1966     COLONOSCOPY  2013    Procedure: COLONOSCOPY;  COLONOSCOPY;  Surgeon: Steffany Rockwell MD;  Location:  GI     EXCISE EXOSTOSIS TIBIA / FIBULA  2014    Procedure: EXCISE EXOSTOSIS TIBIA / FIBULA;  Surgeon: Naila Pichardo MD;  Location:  [facility]     hysteroscopy and d and c      due to bleeding     left anle replacement       right ankle surgery       Social History     Social History     Marital status:      Spouse name: Tom     Number of children: 6     Years of education: N/A     Occupational History     rn anesthetist Retired     Social History Main Topics     Smoking status: Never Smoker     Smokeless tobacco: Never Used     Alcohol use No     Drug use: No     Sexual activity: No     Other Topics Concern     Not on file     Social History Narrative     Current Outpatient Prescriptions   Medication Sig Dispense Refill     Acetaminophen (TYLENOL PO) Take 500 mg by mouth every 6 hours as needed for mild pain or fever       ACYCLOVIR PO Take 400 mg by mouth 2 times daily       Calcium Citrate-Vitamin D (CALCIUM CITRATE + PO) Take 2,000 mg by mouth daily 2 tabs       carboxymethylcellulose (REFRESH PLUS) 0.5 % SOLN 1 drop 4 times daily       Cholecalciferol (VITAMIN D3 PO) Take 1,000 Units by mouth daily       cycloSPORINE (RESTASIS) 0.05 % ophthalmic emulsion Place 1 drop into both eyes every 12 hours        Desloratadine (CLARINEX PO) Take by mouth daily Taking claritin       dexamethasone (DECADRON) 4 MG tablet Take 20mg (5  "tablets) by mouth every week. 28 tablet 0     dexamethasone (DECADRON) 4 MG tablet Take 20mg (5 tablets) by mouth every week. 28 tablet 0     DiphenhydrAMINE HCl (BENADRYL PO) Take 25 mg by mouth nightly as needed       lidocaine-prilocaine (EMLA) cream Apply to port site 1 hour prior to access 30 g 1     LORazepam (ATIVAN) 0.5 MG tablet Take 1 tablet (0.5 mg) by mouth every 8 hours as needed for anxiety 30 tablet 3     metoprolol succinate (TOPROL-XL) 50 MG 24 hr tablet TAKE 2 TABLETS BY MOUTH EVERY MORNING, AND 1 TABLET EVERY EVENING 270 tablet 0     Multiple Vitamin (DAILY MULTIVITAMIN PO) Take 1 tablet by mouth daily.       oxyCODONE IR (ROXICODONE) 15 MG tablet Take 1 tablet (15 mg) by mouth every 8 hours as needed for pain maximum 4 tablet(s) per day 90 tablet 0     polyethylene glycol (MIRALAX/GLYCOLAX) powder Take 1 capful by mouth daily as needed        Polyvinyl Alcohol-Povidone (REFRESH OP)        Prochlorperazine Maleate (COMPAZINE PO) Take 10 mg by mouth daily as needed        SIMBRINZA 1-0.2 % ophthalmic suspension Place 1 drop into the right eye 2 times daily 1 drop AM and PM  2     triamcinolone (KENALOG) 0.5 % cream Apply sparingly to affected area three times daily. 1-2 weeks , as needed 15 g 1     UNABLE TO FIND MEDICATION NAME: Fresh Coat eye drops       warfarin (COUMADIN) 4 MG tablet TAKE 1-2 TABLETS BY MOUTH EVERY DAY AS DIRECTED BY INR CLINIC 110 tablet 0     Zoledronic Acid (ZOMETA IV) Inject into the vein every 30 days Every 3 month dosing       Allergies   Allergen Reactions     Penicillin [Penicillins] Rash     Blotches on chest      FAMILY HISTORY NOTED AND REVIEWED    REVIEW OF SYSTEMS: above    PHYSICAL EXAM    /74  Pulse 88  Ht 5' 4.75\" (1.645 m)  Wt 134 lb (60.8 kg)  SpO2 98%  Breastfeeding? No  BMI 22.47 kg/m2    Patient appears non toxic  No jaundice or scleral ict  Mouth and eyes within normal limits  Neck within normal limits  No supraclavicular, cervical or axillary " lymphadenopathy  Lungs clear, normal flow  cv reglar rate and rhythm, no murmer, rub or gallop, no jvp  Abdomen min bs, distended but soft, non-tender, nomgr, no hepatosplenomegaly    Stat labs and xray noted and reviewed    I then met with patient and daughter again to re evaluate.    ASSESSMENT:  Abdomen discomfort/bloating, no signs of acute abdomen on exam and labs not showing cause.  I suspect this is due to constipation which is chronic and also pain med induced.  I suspect the recent fall is also contributing.  No signs acute abdomen, I doubt bowel obstruction, pud, tumor, meds, liver, pancreatic cause    PLAN:  Will work on bowel regimen with stool softener, prunes and miralax.  She will call if worsens, fever or not improving soon.    Addy Frias M.D.

## 2018-09-20 NOTE — TELEPHONE ENCOUNTER
"FYI.  I scheduled appt.  (add on at 11 am)    Spoke with patient.  Long history of constipation.  States her abdomen becomes very distended intermittently for about one month.  Usually mild with urge to vomit but no emesis.  No change in bowel status. \"Feels like I need a tube to release the air\".          Saw JAYDEN Farah in office yesterday for \"fall\"; forgot to mention the above.  \"Then last night had terrible bloating and pain in abdomen\" (no specific location). Much better today.     Wants to speak to PCP; I offered appt since this was not addressed yesterday.  She is agreeable/thankful.  Tigist Corral RN          "

## 2018-09-20 NOTE — TELEPHONE ENCOUNTER
Patient notified     States she will be in today to see PCP at 11am for abdominal pain (see triage notes from today in other encounter)    Aruna ROBERSON RN

## 2018-09-20 NOTE — MR AVS SNAPSHOT
After Visit Summary   9/20/2018    Amira Arreola    MRN: [id number]           Patient Information     Date Of Birth          7/17/1932        Visit Information        Provider Department      9/20/2018 11:15 AM Addy Frias MD [facility]        Today's Diagnoses     Flatulence, eructation, and gas pain    -  1    Nausea          Care Instructions    Continue the daily stool softener.    Try either prunes or prune juice daiy    Also start miralax and try this daily or less depending on how it works for you.    If this does not work let me know    If your discomfort worsens or you get a fever let me know    Addy Frias M.D.            Follow-ups after your visit        Your next 10 appointments already scheduled     Sep 26, 2018  9:30 AM CDT   Level 2 with  INFUSION CHAIR 7   [facility] ([facility])    [facility]  [address]  [facility] [address]   [phone]            Oct 10, 2018  9:30 AM CDT   Level 2 with  INFUSION CHAIR 13   [facility] ([facility])    [facility]  [address]  [facility] [address]   [phone]            Oct 10, 2018 10:40 AM CDT   Return Visit with Shayne Roberts MD   [facility]    [facility]  [address]  [facility] [address]   [phone]              Who to contact     If you have questions or need follow up information about today's clinic visit or your schedule please contact [facility] directly at [phone].  Normal or non-critical lab and imaging results will be communicated to you by MyChart, letter or phone within 4 business days after the clinic has received the results. If you do not hear from us within 7 days, please contact the clinic through MyChart or phone. If you have a  "critical or abnormal lab result, we will notify you by phone as soon as possible.  Submit refill requests through [employer] or call your pharmacy and they will forward the refill request to us. Please allow 3 business days for your refill to be completed.          Additional Information About Your Visit        Care EveryWhere ID     This is your Care EveryWhere ID. This could be used by other organizations to access your [place] medical records  [id number]        Your Vitals Were     Pulse Height Pulse Oximetry Breastfeeding? BMI (Body Mass Index)       88 5' 4.75\" (1.645 m) 98% No 22.47 kg/m2        Blood Pressure from Last 3 Encounters:   09/20/18 118/74   09/19/18 136/84   09/12/18 154/86    Weight from Last 3 Encounters:   09/20/18 134 lb (60.8 kg)   09/19/18 134 lb 3.2 oz (60.9 kg)   09/12/18 140 lb (63.5 kg)              We Performed the Following     CBC with platelets differential     Comprehensive metabolic panel        Primary Care Provider Office Phone # Fax #    Addy Frias [phone] [address] [facility] [address]  [facility] [address]        Equal Access to Services     [facility]: Hadii aad ku yvetteo Soyair, waaxda luabigailadaha, qaybta kaalmada adesergioyada, hung dominique . So [facility] [phone].    ATENCIÓN: Si habla español, tiene a barlow disposición servicios gratuitos de asistencia lingüística. [facility] [phone].    We comply with applicable federal civil rights laws and [place] laws. We do not discriminate on the basis of race, color, national origin, age, disability, sex, sexual orientation, or gender identity.            Thank you!     Thank you for choosing [facility]  for your care. Our goal is always to provide you with excellent care. Hearing back from our patients is one way we can continue to improve our services. Please take a few minutes to complete the written survey that you may receive in the mail after your visit with " us. Thank you!             Your Updated Medication List - Protect others around you: Learn how to safely use, store and throw away your medicines at www.disposemymeds.org.          This list is accurate as of 9/20/18 12:32 PM.  Always use your most recent med list.                   Brand Name Dispense Instructions for use Diagnosis    ACYCLOVIR PO      Take 400 mg by mouth 2 times daily        BENADRYL PO      Take 25 mg by mouth nightly as needed        CALCIUM CITRATE + PO      Take 2,000 mg by mouth daily 2 tabs        carboxymethylcellulose 0.5 % Soln ophthalmic solution    REFRESH PLUS     1 drop 4 times daily        CLARINEX PO      Take by mouth daily Taking claritin        COMPAZINE PO      Take 10 mg by mouth daily as needed        cycloSPORINE 0.05 % ophthalmic emulsion    RESTASIS     Place 1 drop into both eyes every 12 hours        DAILY MULTIVITAMIN PO      Take 1 tablet by mouth daily.    Routine general medical examination at a health care facility       * dexamethasone 4 MG tablet    DECADRON    28 tablet    Take 20mg (5 tablets) by mouth every week.    Multiple myeloma not having achieved remission (H)       * dexamethasone 4 MG tablet    DECADRON    28 tablet    Take 20mg (5 tablets) by mouth every week.    Multiple myeloma not having achieved remission (H)       lidocaine-prilocaine cream    EMLA    30 g    Apply to port site 1 hour prior to access    Multiple myeloma not having achieved remission (H)       LORazepam 0.5 MG tablet    ATIVAN    30 tablet    Take 1 tablet (0.5 mg) by mouth every 8 hours as needed for anxiety    Multiple myeloma not having achieved remission (H)       metoprolol succinate 50 MG 24 hr tablet    TOPROL-XL    270 tablet    TAKE 2 TABLETS BY MOUTH EVERY MORNING, AND 1 TABLET EVERY EVENING    Paroxysmal atrial fibrillation (H)       oxyCODONE IR 15 MG tablet    ROXICODONE    90 tablet    Take 1 tablet (15 mg) by mouth every 8 hours as needed for pain maximum 4 tablet(s)  per day    Multiple myeloma not having achieved remission (H)       polyethylene glycol powder    MIRALAX/GLYCOLAX     Take 1 capful by mouth daily as needed    Bilateral leg edema       REFRESH OP           SIMBRINZA 1-0.2 % ophthalmic suspension   Generic drug:  brinzolamide-brimonidine      Place 1 drop into the right eye 2 times daily 1 drop AM and PM        triamcinolone 0.5 % cream    KENALOG    15 g    Apply sparingly to affected area three times daily. 1-2 weeks , as needed    Rash and nonspecific skin eruption       TYLENOL PO      Take 500 mg by mouth every 6 hours as needed for mild pain or fever        UNABLE TO FIND      MEDICATION NAME: Fresh Coat eye drops        VITAMIN D3 PO      Take 1,000 Units by mouth daily        warfarin 4 MG tablet    COUMADIN    110 tablet    TAKE 1-2 TABLETS BY MOUTH EVERY DAY AS DIRECTED BY INR CLINIC    Paroxysmal atrial fibrillation (H), Long-term (current) use of anticoagulants       ZOMETA IV      Inject into the vein every 30 days Every 3 month dosing        * Notice:  This list has 2 medication(s) that are the same as other medications prescribed for you. Read the directions carefully, and ask your doctor or other care provider to review them with you.

## 2018-09-20 NOTE — TELEPHONE ENCOUNTER
Pt was in clinic yesterday and forgot to mention to the doctor that she has a very distended belly and she is a lot of pain. She wasn't too worried about it yesterday but last night got very bad. Should she come to see the doctor again?  I offered some appt times for today, but pt would like to talk to the doctor first.  Please call her back at [phone]

## 2018-09-26 ENCOUNTER — HOSPITAL ENCOUNTER (OUTPATIENT)
Facility: CLINIC | Age: 83
Setting detail: SPECIMEN
Discharge: HOME | End: 2018-09-26
Attending: INTERNAL MEDICINE | Admitting: INTERNAL MEDICINE
Payer: MEDICARE

## 2018-09-26 ENCOUNTER — ANTICOAGULATION THERAPY VISIT (OUTPATIENT)
Dept: FAMILY MEDICINE | Facility: CLINIC | Age: 83
End: 2018-09-26
Payer: COMMERCIAL

## 2018-09-26 ENCOUNTER — INFUSION THERAPY VISIT (OUTPATIENT)
Dept: INFUSION THERAPY | Facility: CLINIC | Age: 83
End: 2018-09-26
Attending: INTERNAL MEDICINE
Payer: MEDICARE

## 2018-09-26 VITALS
RESPIRATION RATE: 18 BRPM | BODY MASS INDEX: 22.42 KG/M2 | OXYGEN SATURATION: 96 % | TEMPERATURE: 97.8 F | WEIGHT: 134.6 LBS | HEIGHT: 65 IN | DIASTOLIC BLOOD PRESSURE: 82 MMHG | SYSTOLIC BLOOD PRESSURE: 165 MMHG | HEART RATE: 74 BPM

## 2018-09-26 DIAGNOSIS — Z79.01 LONG-TERM (CURRENT) USE OF ANTICOAGULANTS: ICD-10-CM

## 2018-09-26 DIAGNOSIS — C90.00 MULTIPLE MYELOMA NOT HAVING ACHIEVED REMISSION (H): Primary | ICD-10-CM

## 2018-09-26 DIAGNOSIS — I48.0 PAROXYSMAL ATRIAL FIBRILLATION (H): ICD-10-CM

## 2018-09-26 LAB
ALBUMIN SERPL-MCNC: 3.6 G/DL (ref 3.4–5)
ALP SERPL-CCNC: 52 U/L (ref 40–150)
ALT SERPL W P-5'-P-CCNC: 19 U/L (ref 0–50)
AST SERPL W P-5'-P-CCNC: 20 U/L (ref 0–45)
BASOPHILS # BLD AUTO: 0 10E9/L (ref 0–0.2)
BASOPHILS NFR BLD AUTO: 0 %
BILIRUB DIRECT SERPL-MCNC: 0.3 MG/DL (ref 0–0.2)
BILIRUB SERPL-MCNC: 0.6 MG/DL (ref 0.2–1.3)
DIFFERENTIAL METHOD BLD: ABNORMAL
EOSINOPHIL # BLD AUTO: 0 10E9/L (ref 0–0.7)
EOSINOPHIL NFR BLD AUTO: 0.2 %
ERYTHROCYTE [DISTWIDTH] IN BLOOD BY AUTOMATED COUNT: 14.8 % (ref 10–15)
HCT VFR BLD AUTO: 36.2 % (ref 35–47)
HGB BLD-MCNC: 12 G/DL (ref 11.7–15.7)
IMM GRANULOCYTES # BLD: 0 10E9/L (ref 0–0.4)
IMM GRANULOCYTES NFR BLD: 0.2 %
INR PPP: 2.45 (ref 0.86–1.14)
LYMPHOCYTES # BLD AUTO: 0.2 10E9/L (ref 0.8–5.3)
LYMPHOCYTES NFR BLD AUTO: 3.6 %
MCH RBC QN AUTO: 31.8 PG (ref 26.5–33)
MCHC RBC AUTO-ENTMCNC: 33.1 G/DL (ref 31.5–36.5)
MCV RBC AUTO: 96 FL (ref 78–100)
MONOCYTES # BLD AUTO: 0.2 10E9/L (ref 0–1.3)
MONOCYTES NFR BLD AUTO: 2.6 %
NEUTROPHILS # BLD AUTO: 5.4 10E9/L (ref 1.6–8.3)
NEUTROPHILS NFR BLD AUTO: 93.4 %
NRBC # BLD AUTO: 0 10*3/UL
NRBC BLD AUTO-RTO: 0 /100
PLATELET # BLD AUTO: 173 10E9/L (ref 150–450)
PROT SERPL-MCNC: 6.4 G/DL (ref 6.8–8.8)
RBC # BLD AUTO: 3.77 10E12/L (ref 3.8–5.2)
WBC # BLD AUTO: 5.8 10E9/L (ref 4–11)

## 2018-09-26 PROCEDURE — 96375 TX/PRO/DX INJ NEW DRUG ADDON: CPT

## 2018-09-26 PROCEDURE — 96401 CHEMO ANTI-NEOPL SQ/IM: CPT

## 2018-09-26 PROCEDURE — 25000128 H RX IP 250 OP 636: Performed by: INTERNAL MEDICINE

## 2018-09-26 PROCEDURE — 83883 ASSAY NEPHELOMETRY NOT SPEC: CPT | Performed by: INTERNAL MEDICINE

## 2018-09-26 PROCEDURE — 00000402 ZZHCL STATISTIC TOTAL PROTEIN: Performed by: INTERNAL MEDICINE

## 2018-09-26 PROCEDURE — A9270 NON-COVERED ITEM OR SERVICE: HCPCS | Mod: GY | Performed by: INTERNAL MEDICINE

## 2018-09-26 PROCEDURE — 99207 ZZC NO CHARGE NURSE ONLY: CPT | Performed by: INTERNAL MEDICINE

## 2018-09-26 PROCEDURE — 85610 PROTHROMBIN TIME: CPT | Performed by: INTERNAL MEDICINE

## 2018-09-26 PROCEDURE — 80076 HEPATIC FUNCTION PANEL: CPT | Performed by: INTERNAL MEDICINE

## 2018-09-26 PROCEDURE — 96415 CHEMO IV INFUSION ADDL HR: CPT

## 2018-09-26 PROCEDURE — 25000132 ZZH RX MED GY IP 250 OP 250 PS 637: Mod: GY | Performed by: INTERNAL MEDICINE

## 2018-09-26 PROCEDURE — 84165 PROTEIN E-PHORESIS SERUM: CPT | Performed by: INTERNAL MEDICINE

## 2018-09-26 PROCEDURE — 96367 TX/PROPH/DG ADDL SEQ IV INF: CPT

## 2018-09-26 PROCEDURE — 96413 CHEMO IV INFUSION 1 HR: CPT

## 2018-09-26 PROCEDURE — 85025 COMPLETE CBC W/AUTO DIFF WBC: CPT | Performed by: INTERNAL MEDICINE

## 2018-09-26 RX ORDER — ACETAMINOPHEN 325 MG/1
650 TABLET ORAL ONCE
Status: COMPLETED | OUTPATIENT
Start: 2018-09-26 | End: 2018-09-26

## 2018-09-26 RX ORDER — DEXAMETHASONE 4 MG/1
TABLET ORAL
Qty: 28 TABLET | Refills: 0 | Status: SHIPPED | OUTPATIENT
Start: 2018-09-26 | End: 2018-10-10

## 2018-09-26 RX ORDER — DEXAMETHASONE 4 MG/1
TABLET ORAL
Qty: 28 TABLET | Refills: 0 | Status: SHIPPED | OUTPATIENT
Start: 2018-09-26 | End: 2018-09-26

## 2018-09-26 RX ORDER — HEPARIN SODIUM (PORCINE) LOCK FLUSH IV SOLN 100 UNIT/ML 100 UNIT/ML
5 SOLUTION INTRAVENOUS EVERY 8 HOURS
Status: DISCONTINUED | OUTPATIENT
Start: 2018-09-26 | End: 2018-09-26 | Stop reason: HOSPADM

## 2018-09-26 RX ADMIN — SODIUM CHLORIDE 250 ML: 9 INJECTION, SOLUTION INTRAVENOUS at 10:49

## 2018-09-26 RX ADMIN — ACETAMINOPHEN 650 MG: 325 TABLET ORAL at 10:51

## 2018-09-26 RX ADMIN — BORTEZOMIB 2.3 MG: 3.5 INJECTION, POWDER, LYOPHILIZED, FOR SOLUTION INTRAVENOUS; SUBCUTANEOUS at 13:15

## 2018-09-26 RX ADMIN — DARATUMUMAB 1000 MG: 100 INJECTION, SOLUTION, CONCENTRATE INTRAVENOUS at 11:29

## 2018-09-26 RX ADMIN — DEXAMETHASONE SODIUM PHOSPHATE 12 MG: 10 INJECTION, SOLUTION INTRAMUSCULAR; INTRAVENOUS at 10:49

## 2018-09-26 RX ADMIN — DIPHENHYDRAMINE HYDROCHLORIDE 50 MG: 50 INJECTION, SOLUTION INTRAMUSCULAR; INTRAVENOUS at 11:10

## 2018-09-26 RX ADMIN — SODIUM CHLORIDE, PRESERVATIVE FREE 5 ML: 5 INJECTION INTRAVENOUS at 13:19

## 2018-09-26 ASSESSMENT — PAIN DESCRIPTION - DESCRIPTORS: DESCRIPTORS: SORE

## 2018-09-26 NOTE — MR AVS SNAPSHOT
After Visit Summary   9/26/2018    Amira Arreola    MRN: [id number]           Patient Information     Date Of Birth          7/17/1932        Visit Information        Provider Department      9/26/2018 9:30 AM  INFUSION CHAIR 3 [facility]        Today's Diagnoses     Multiple myeloma not having achieved remission (H)    -  1    Paroxysmal atrial fibrillation (H)           Follow-ups after your visit        Your next 10 appointments already scheduled     Oct 10, 2018  9:30 AM CDT   Level 2 with  INFUSION CHAIR 13   [facility] ([facility])    [facility]  [address]  [place] [address]   [phone]            Oct 10, 2018 10:40 AM CDT   Return Visit with Shayne Roberts MD   [facility] ([facility])    [facility]  [address]  [place] [address]   [phone]              Who to contact     If you have questions or need follow up information about today's clinic visit or your schedule please contact [facility] directly at [phone].  Normal or non-critical lab and imaging results will be communicated to you by MyChart, letter or phone within 4 business days after the clinic has received the results. If you do not hear from us within 7 days, please contact the clinic through MyChart or phone. If you have a critical or abnormal lab result, we will notify you by phone as soon as possible.  Submit refill requests through [employer] or call your pharmacy and they will forward the refill request to us. Please allow 3 business days for your refill to be completed.          Additional Information About Your Visit        Care EveryWhere ID     This is your Care EveryWhere ID. This could be used by other organizations to access your [facility] medical records  [id number]        Your Vitals  "Were     Pulse Temperature Respirations Height Pulse Oximetry BMI (Body Mass Index)    74 97.8  F (36.6  C) (Oral) 18 1.645 m (5' 4.76\") 96% 22.56 kg/m2       Blood Pressure from Last 3 Encounters:   09/26/18 165/82   09/20/18 118/74   09/19/18 136/84    Weight from Last 3 Encounters:   09/26/18 61.1 kg (134 lb 9.6 oz)   09/20/18 60.8 kg (134 lb)   09/19/18 60.9 kg (134 lb 3.2 oz)              We Performed the Following     CBC with platelets differential     Hepatic panel     INR     Kappa and lambda light chain     Protein electrophoresis          Today's Medication Changes          These changes are accurate as of 9/26/18  1:24 PM.  If you have any questions, ask your nurse or doctor.               These medicines have changed or have updated prescriptions.        Dose/Directions    * dexamethasone 4 MG tablet   Commonly known as:  DECADRON   This may have changed:  Another medication with the same name was added. Make sure you understand how and when to take each.   Used for:  Multiple myeloma not having achieved remission (H)        Take 20mg (5 tablets) by mouth every week.   Quantity:  28 tablet   Refills:  0       * dexamethasone 4 MG tablet   Commonly known as:  DECADRON   This may have changed:  Another medication with the same name was added. Make sure you understand how and when to take each.   Used for:  Multiple myeloma not having achieved remission (H)        Take 20mg (5 tablets) by mouth every week.   Quantity:  28 tablet   Refills:  0       * dexamethasone 4 MG tablet   Commonly known as:  DECADRON   This may have changed:  You were already taking a medication with the same name, and this prescription was added. Make sure you understand how and when to take each.   Used for:  Multiple myeloma not having achieved remission (H)        Take 20mg (5 tablets) by mouth every week.   Quantity:  28 tablet   Refills:  0       * Notice:  This list has 3 medication(s) that are the same as other medications " prescribed for you. Read the directions carefully, and ask your doctor or other care provider to review them with you.         Where to get your medicines      These medications were sent to [id number] - [place], [place] - [address] AT [facility] [address]  [address], [place] [address]     Phone:  [phone]     dexamethasone 4 MG tablet                Primary Care Provider Office Phone # Fax #    Addy Sean Frias [phone] [address] [facility]  [facility] [address]        Equal Access to Services     [facility]: Hadii aad ku hadasho Soomaali, waaxda luqadaha, qaybta kaalmada adeegyada, waxay genoin hayaan adeeg johnathon dominique . So [facility] [phone].    ATENCIÓN: Si habla español, tiene a barlow disposición servicios gratuitos de asistencia lingüística. [facility] [phone].    We comply with applicable federal civil rights laws and [place] laws. We do not discriminate on the basis of race, color, national origin, age, disability, sex, sexual orientation, or gender identity.            Thank you!     Thank you for choosing [facility] AND [facility]  for your care. Our goal is always to provide you with excellent care. Hearing back from our patients is one way we can continue to improve our services. Please take a few minutes to complete the written survey that you may receive in the mail after your visit with us. Thank you!             Your Updated Medication List - Protect others around you: Learn how to safely use, store and throw away your medicines at www.disposemymeds.org.          This list is accurate as of 9/26/18  1:24 PM.  Always use your most recent med list.                   Brand Name Dispense Instructions for use Diagnosis    ACYCLOVIR PO      Take 400 mg by mouth 2 times daily        BENADRYL PO      Take 25 mg by mouth nightly as needed        CALCIUM CITRATE + PO      Take 2,000 mg by mouth daily 2 tabs         carboxymethylcellulose 0.5 % Soln ophthalmic solution    REFRESH PLUS     1 drop 4 times daily        CLARINEX PO      Take by mouth daily Taking claritin        COMPAZINE PO      Take 10 mg by mouth daily as needed        cycloSPORINE 0.05 % ophthalmic emulsion    RESTASIS     Place 1 drop into both eyes every 12 hours        DAILY MULTIVITAMIN PO      Take 1 tablet by mouth daily.    Routine general medical examination at a health care facility       * dexamethasone 4 MG tablet    DECADRON    28 tablet    Take 20mg (5 tablets) by mouth every week.    Multiple myeloma not having achieved remission (H)       * dexamethasone 4 MG tablet    DECADRON    28 tablet    Take 20mg (5 tablets) by mouth every week.    Multiple myeloma not having achieved remission (H)       * dexamethasone 4 MG tablet    DECADRON    28 tablet    Take 20mg (5 tablets) by mouth every week.    Multiple myeloma not having achieved remission (H)       lidocaine-prilocaine cream    EMLA    30 g    Apply to port site 1 hour prior to access    Multiple myeloma not having achieved remission (H)       LORazepam 0.5 MG tablet    ATIVAN    30 tablet    Take 1 tablet (0.5 mg) by mouth every 8 hours as needed for anxiety    Multiple myeloma not having achieved remission (H)       metoprolol succinate 50 MG 24 hr tablet    TOPROL-XL    270 tablet    TAKE 2 TABLETS BY MOUTH EVERY MORNING, AND 1 TABLET EVERY EVENING    Paroxysmal atrial fibrillation (H)       oxyCODONE IR 15 MG tablet    ROXICODONE    90 tablet    Take 1 tablet (15 mg) by mouth every 8 hours as needed for pain maximum 4 tablet(s) per day    Multiple myeloma not having achieved remission (H)       polyethylene glycol powder    MIRALAX/GLYCOLAX     Take 1 capful by mouth daily as needed    Bilateral leg edema       REFRESH OP           SIMBRINZA 1-0.2 % ophthalmic suspension   Generic drug:  brinzolamide-brimonidine      Place 1 drop into the right eye 2 times daily 1 drop AM and PM         triamcinolone 0.5 % cream    KENALOG    15 g    Apply sparingly to affected area three times daily. 1-2 weeks , as needed    Rash and nonspecific skin eruption       TYLENOL PO      Take 500 mg by mouth every 6 hours as needed for mild pain or fever        UNABLE TO FIND      MEDICATION NAME: Fresh Coat eye drops        VITAMIN D3 PO      Take 1,000 Units by mouth daily        warfarin 4 MG tablet    COUMADIN    110 tablet    TAKE 1-2 TABLETS BY MOUTH EVERY DAY AS DIRECTED BY INR CLINIC    Paroxysmal atrial fibrillation (H), Long-term (current) use of anticoagulants       ZOMETA IV      Inject into the vein every 30 days Every 3 month dosing        * Notice:  This list has 3 medication(s) that are the same as other medications prescribed for you. Read the directions carefully, and ask your doctor or other care provider to review them with you.

## 2018-09-26 NOTE — PROGRESS NOTES
Infusion Nursing Note:  Amira Arreola presents today for velcade/darzalex.    Patient seen by provider today: No   present during visit today: Not Applicable.    Note: N/A.    Intravenous Access:  Labs drawn without difficulty.  Implanted Port.    Treatment Conditions:  Lab Results   Component Value Date    HGB 12.0 09/26/2018     Lab Results   Component Value Date    WBC 5.8 09/26/2018      Lab Results   Component Value Date    ANEU 5.4 09/26/2018     Lab Results   Component Value Date     09/26/2018      Lab Results   Component Value Date     09/20/2018                   Lab Results   Component Value Date    POTASSIUM 3.8 09/20/2018           No results found for: MAG         Lab Results   Component Value Date    CR 0.63 09/20/2018                   Lab Results   Component Value Date    BRADLEY 9.4 09/20/2018                Lab Results   Component Value Date    BILITOTAL 0.6 09/26/2018           Lab Results   Component Value Date    ALBUMIN 3.6 09/26/2018                    Lab Results   Component Value Date    ALT 19 09/26/2018           Lab Results   Component Value Date    AST 20 09/26/2018       Results reviewed, labs MET treatment parameters, ok to proceed with treatment.      Post Infusion Assessment:  Patient tolerated infusion without incident.  Patient tolerated injection without incident.  Blood return noted pre and post infusion.  Site patent and intact, free from redness, edema or discomfort.  No evidence of extravasations.    Discharge Plan:   Prescription refills given for decadron.  Pt taken down to lobby in wheel chair to be picked up by her son.    Nicky Reis RN

## 2018-09-26 NOTE — MR AVS SNAPSHOT
Amira IVY Arreola   9/26/2018   Anticoagulation Therapy Visit    Description:  86 year old female   Provider:  Addy Frias MD   Department:  [facility]           INR as of 9/26/2018     Today's INR 2.45      Anticoagulation Summary as of 9/26/2018     INR goal 2.0-3.0   Today's INR 2.45   Full warfarin instructions 4 mg every day   Next INR check 10/10/2018    Indications   Long-term (current) use of anticoagulants [Z79.01] [Z79.01]  Atrial fibrillation (H) [I48.91] (Resolved) [I48.91]         September 2018 Details    Sun Mon Tue Wed Thu Fri Sat           1                 2               3               4               5               6               7               8                 9               10               11               12               13               14               15                 16               17               18               19               20               21               22                 23               24               25               26      4 mg   See details      27      4 mg         28      4 mg         29      4 mg           30      4 mg                Date Details   09/26 This INR check               How to take your warfarin dose     To take:  4 mg Take 1 of the 4 mg tablets.           October 2018 Details    Sun Mon Tue Wed Thu Fri Sat      1      4 mg         2      4 mg         3      4 mg         4      4 mg         5      4 mg         6      4 mg           7      4 mg         8      4 mg         9      4 mg         10            11               12               13                 14               15               16               17               18               19               20                 21               22               23               24               25               26               27                 28               29               30               31                   Date Details   No additional details    Date of next INR:   10/10/2018         How to take your warfarin dose     To take:  4 mg Take 1 of the 4 mg tablets.

## 2018-09-27 LAB
KAPPA LC UR-MCNC: 1.88 MG/DL (ref 0.33–1.94)
KAPPA LC/LAMBDA SER: ABNORMAL {RATIO} (ref 0.26–1.65)
LAMBDA LC SERPL-MCNC: <0.25 MG/DL (ref 0.57–2.63)

## 2018-09-28 LAB
ALBUMIN SERPL ELPH-MCNC: 3.9 G/DL (ref 3.7–5.1)
ALPHA1 GLOB SERPL ELPH-MCNC: 0.4 G/DL (ref 0.2–0.4)
ALPHA2 GLOB SERPL ELPH-MCNC: 0.8 G/DL (ref 0.5–0.9)
B-GLOBULIN SERPL ELPH-MCNC: 0.7 G/DL (ref 0.6–1)
GAMMA GLOB SERPL ELPH-MCNC: 0.2 G/DL (ref 0.7–1.6)
M PROTEIN SERPL ELPH-MCNC: 0 G/DL
PROT PATTERN SERPL ELPH-IMP: ABNORMAL

## 2018-10-05 ENCOUNTER — TELEPHONE (OUTPATIENT)
Dept: FAMILY MEDICINE | Facility: CLINIC | Age: 83
End: 2018-10-05

## 2018-10-05 NOTE — TELEPHONE ENCOUNTER
Reason for Call:  Medication     Other request: Pain in Back and wants to review with someone about her taking  Pain medications and taking Tylenol     Can we leave a detailed message on this number? YES    Phone number patient can be reached at: [phone] (daughter- Eufemia)  She will have her mom give a verbal from her mom when we call her back    Best Time: anyime    Call taken on 10/5/2018 at 11:39 AM by Pacheco Villagran

## 2018-10-05 NOTE — TELEPHONE ENCOUNTER
Called and spoke to Eufemia. Eufemia is an opthamologist from [place]. Eufemia informed that I can't provide much information because there is not a a CTC. Eufemia states that she is not with pt right now but may call back with her mom present.  Eufemia concerned about pt's pain control versus too much pain medication. Eufemia aware that pt takes oxycodone and Tylenol as needed. Eufemia thought that maybe she will set up her mom's pill box with 1 oxycodone every 8 hours and a tylenol 500 mg in between that every 6 hours.

## 2018-10-10 ENCOUNTER — HOSPITAL ENCOUNTER (OUTPATIENT)
Facility: CLINIC | Age: 83
Setting detail: SPECIMEN
Discharge: HOME | End: 2018-10-10
Attending: INTERNAL MEDICINE | Admitting: INTERNAL MEDICINE
Payer: MEDICARE

## 2018-10-10 ENCOUNTER — ANTICOAGULATION THERAPY VISIT (OUTPATIENT)
Dept: FAMILY MEDICINE | Facility: CLINIC | Age: 83
End: 2018-10-10
Payer: COMMERCIAL

## 2018-10-10 ENCOUNTER — INFUSION THERAPY VISIT (OUTPATIENT)
Dept: INFUSION THERAPY | Facility: CLINIC | Age: 83
End: 2018-10-10
Attending: INTERNAL MEDICINE
Payer: MEDICARE

## 2018-10-10 ENCOUNTER — ONCOLOGY VISIT (OUTPATIENT)
Dept: ONCOLOGY | Facility: CLINIC | Age: 83
End: 2018-10-10
Attending: INTERNAL MEDICINE
Payer: MEDICARE

## 2018-10-10 VITALS
SYSTOLIC BLOOD PRESSURE: 130 MMHG | RESPIRATION RATE: 16 BRPM | BODY MASS INDEX: 23.06 KG/M2 | HEART RATE: 71 BPM | HEIGHT: 65 IN | DIASTOLIC BLOOD PRESSURE: 77 MMHG | OXYGEN SATURATION: 95 % | TEMPERATURE: 98.4 F | WEIGHT: 138.4 LBS

## 2018-10-10 VITALS
DIASTOLIC BLOOD PRESSURE: 77 MMHG | RESPIRATION RATE: 16 BRPM | SYSTOLIC BLOOD PRESSURE: 130 MMHG | OXYGEN SATURATION: 95 % | TEMPERATURE: 98.4 F | BODY MASS INDEX: 23.06 KG/M2 | HEIGHT: 65 IN | WEIGHT: 138.4 LBS | HEART RATE: 71 BPM

## 2018-10-10 DIAGNOSIS — I48.0 PAROXYSMAL ATRIAL FIBRILLATION (H): ICD-10-CM

## 2018-10-10 DIAGNOSIS — C90.00 MULTIPLE MYELOMA NOT HAVING ACHIEVED REMISSION (H): Primary | ICD-10-CM

## 2018-10-10 DIAGNOSIS — Z95.828 PORT-A-CATH IN PLACE: ICD-10-CM

## 2018-10-10 LAB
BASOPHILS # BLD AUTO: 0 10E9/L (ref 0–0.2)
BASOPHILS NFR BLD AUTO: 0.1 %
DIFFERENTIAL METHOD BLD: ABNORMAL
EOSINOPHIL # BLD AUTO: 0 10E9/L (ref 0–0.7)
EOSINOPHIL NFR BLD AUTO: 0 %
ERYTHROCYTE [DISTWIDTH] IN BLOOD BY AUTOMATED COUNT: 15.3 % (ref 10–15)
HCT VFR BLD AUTO: 34.3 % (ref 35–47)
HGB BLD-MCNC: 11.2 G/DL (ref 11.7–15.7)
IMM GRANULOCYTES # BLD: 0 10E9/L (ref 0–0.4)
IMM GRANULOCYTES NFR BLD: 0.3 %
INR PPP: 4.43 (ref 0.86–1.14)
LYMPHOCYTES # BLD AUTO: 0.4 10E9/L (ref 0.8–5.3)
LYMPHOCYTES NFR BLD AUTO: 4.5 %
MCH RBC QN AUTO: 31.7 PG (ref 26.5–33)
MCHC RBC AUTO-ENTMCNC: 32.7 G/DL (ref 31.5–36.5)
MCV RBC AUTO: 97 FL (ref 78–100)
MONOCYTES # BLD AUTO: 0.3 10E9/L (ref 0–1.3)
MONOCYTES NFR BLD AUTO: 3.4 %
NEUTROPHILS # BLD AUTO: 7.1 10E9/L (ref 1.6–8.3)
NEUTROPHILS NFR BLD AUTO: 91.7 %
NRBC # BLD AUTO: 0 10*3/UL
NRBC BLD AUTO-RTO: 0 /100
PLATELET # BLD AUTO: 146 10E9/L (ref 150–450)
RBC # BLD AUTO: 3.53 10E12/L (ref 3.8–5.2)
WBC # BLD AUTO: 7.7 10E9/L (ref 4–11)

## 2018-10-10 PROCEDURE — 99207 ZZC NO CHARGE NURSE ONLY: CPT | Performed by: INTERNAL MEDICINE

## 2018-10-10 PROCEDURE — 96401 CHEMO ANTI-NEOPL SQ/IM: CPT

## 2018-10-10 PROCEDURE — 85025 COMPLETE CBC W/AUTO DIFF WBC: CPT | Performed by: INTERNAL MEDICINE

## 2018-10-10 PROCEDURE — 85610 PROTHROMBIN TIME: CPT | Performed by: INTERNAL MEDICINE

## 2018-10-10 PROCEDURE — G0463 HOSPITAL OUTPT CLINIC VISIT: HCPCS | Mod: 25

## 2018-10-10 PROCEDURE — 25000128 H RX IP 250 OP 636: Performed by: INTERNAL MEDICINE

## 2018-10-10 PROCEDURE — 99214 OFFICE O/P EST MOD 30 MIN: CPT | Performed by: INTERNAL MEDICINE

## 2018-10-10 RX ORDER — HEPARIN SODIUM (PORCINE) LOCK FLUSH IV SOLN 100 UNIT/ML 100 UNIT/ML
500 SOLUTION INTRAVENOUS EVERY 8 HOURS
Status: DISCONTINUED | OUTPATIENT
Start: 2018-10-10 | End: 2018-10-10 | Stop reason: HOSPADM

## 2018-10-10 RX ORDER — OXYCODONE HYDROCHLORIDE 15 MG/1
15 TABLET ORAL EVERY 8 HOURS PRN
Qty: 90 TABLET | Refills: 0 | Status: SHIPPED | OUTPATIENT
Start: 2018-10-10 | End: 2018-11-21

## 2018-10-10 RX ORDER — HEPARIN SODIUM (PORCINE) LOCK FLUSH IV SOLN 100 UNIT/ML 100 UNIT/ML
500 SOLUTION INTRAVENOUS EVERY 8 HOURS
Status: CANCELLED
Start: 2018-10-10

## 2018-10-10 RX ADMIN — BORTEZOMIB 2.3 MG: 3.5 INJECTION, POWDER, LYOPHILIZED, FOR SOLUTION INTRAVENOUS; SUBCUTANEOUS at 11:38

## 2018-10-10 RX ADMIN — SODIUM CHLORIDE, PRESERVATIVE FREE 500 UNITS: 5 INJECTION INTRAVENOUS at 12:47

## 2018-10-10 ASSESSMENT — PAIN SCALES - GENERAL: PAINLEVEL: NO PAIN (0)

## 2018-10-10 NOTE — PROGRESS NOTES
Add 50720 Cpt? (Important Note: In 2017 The Use Of 54687 Is Being Tracked By Cms To Determine Future Global Period Reimbursement For Global Periods): no
Infusion Nursing Note:  Amira Arreola presents today for C1D15 velcade.    Patient seen by provider today: Yes: Dr. Roberts   present during visit today: Not Applicable.    Note: N/A.    Intravenous Access:  Lab draw site rac, Needle type bf, Gauge 23.  Labs drawn without difficulty.    Treatment Conditions:  Lab Results   Component Value Date    HGB 11.5 04/04/2017     Lab Results   Component Value Date    WBC 4.6 04/04/2017      Lab Results   Component Value Date    ANEU 3.0 04/04/2017     Lab Results   Component Value Date     04/04/2017      Lab Results   Component Value Date     10/12/2016                   Lab Results   Component Value Date    POTASSIUM 4.4 10/12/2016           No results found for: MAG         Lab Results   Component Value Date    CR 0.66 04/04/2017                   Lab Results   Component Value Date    BRADLEY 9.0 10/12/2016                Lab Results   Component Value Date    BILITOTAL 0.4 03/21/2017           Lab Results   Component Value Date    ALBUMIN 3.4 03/21/2017                    Lab Results   Component Value Date    ALT 25 03/21/2017           Lab Results   Component Value Date    AST 20 03/21/2017     Results reviewed, labs MET treatment parameters, ok to proceed with treatment.      Post Infusion Assessment:  Patient tolerated injection without incident.  Site patent and intact, free from redness, edema or discomfort.    Discharge Plan:   Prescription refill escribed for decadron to [facility].  Copy of AVS reviewed with patient and/or family.  Patient will return 4/11/17 for next appointment.  Patient discharged in stable condition accompanied by: .  Departure Mode: Ambulatory.    Jamari Gamboa RN                        
Detail Level: Detailed

## 2018-10-10 NOTE — Clinical Note
"    10/10/2018         RE: Amira Arreola  [address]  [place] [address]        Dear Colleague,    Thank you for referring your patient, Amira Arreola, to the [facility]. Please see a copy of my visit note below.    Oncology Rooming Note    October 10, 2018 10:20 AM   Amira Arreola is a 86 year old female who presents for:    Chief Complaint   Patient presents with     Oncology Clinic Visit     Multiple myeloma not having achieved remission     Initial Vitals: /77  Pulse 71  Temp 98.4  F (36.9  C) (Oral)  Resp 16  Ht 1.645 m (5' 4.76\")  Wt 62.8 kg (138 lb 6.4 oz)  SpO2 95%  BMI 23.2 kg/m2 Estimated body mass index is 23.2 kg/(m^2) as calculated from the following:    Height as of this encounter: 1.645 m (5' 4.76\").    Weight as of this encounter: 62.8 kg (138 lb 6.4 oz). Body surface area is 1.69 meters squared.  No Pain (0) Comment: Data Unavailable   No LMP recorded. Patient is postmenopausal.  Allergies reviewed:No   Medications reviewed:No    Medications: Medication refills not needed today.  Pharmacy name entered into [employer]: [facility] [id number] [place] [address], [place] - [address] AT [facility] [address]    Clinical concerns: None                    4 minutes for nursing intake (face to face time)     Zora Bentley MA              Visit Date:   10/10/2018      SUBJECTIVE:  Ms. Arreola is an 86-year-old female with kappa free light chain multiple myeloma.  She is on Velcade, dexamethasone and daratumumab.  For convenience, we are giving her Velcade every 14 days.      Her disease is responding.  Her kappa free light chain was 1.88 on 09/26/2018.  Previously it was as high as 60.      The patient has some fatigue, which would go along with her age.  No headache.  No dizziness.  No chest pain, difficulty breathing.  No abdominal pain, nausea or vomiting.  No urinary or bowel complaints.  No bleeding.  No fever, chills or night sweats.      The patient has " chronic upper back pain.  She wants a refill on oxycodone, which will be given.      PHYSICAL EXAMINATION:  Unchanged.      LABORATORY DATA:  Reviewed.      ASSESSMENT:   1.  An 86-year-old female with kappa free light chain multiple myeloma which is responding to treatment.   2.  Chronic upper back pain.   3.  Fatigue secondary to old age, myeloma and anemia.   4.  Peripheral neuropathy, which is stable.      PLAN:   1.  I discussed with her and her daughter regarding myeloma.  Vona free light chain has remained below 2.  This is good.   -- She will be continued on daratumumab, Velcade and dexamethasone.  She is tolerating it well.  Side effects reviewed.   2.  She gets Zometa every 3 months, which will be continued.  She does not have any dental or jaw related symptoms.   3.  The patient has chronic pain secondary to myeloma.  Prescription of oxycodone refilled.   4.  I will see her for followup in 4-6 weeks' time.  The patient advised to see a physician sooner if she has worsening fatigue, worsening pain, recurrent vomiting, bleeding or any other concerns.         ITZ LOPEZ MD             D: 10/10/2018   T: 10/10/2018   MT:       Name:     ALBERTO PARSON   MRN:      [id number]        Account:      [id number]   :      1932           Visit Date:   10/10/2018      Document: [id number]       Again, thank you for allowing me to participate in the care of your patient.        Sincerely,        Itz Lopez MD

## 2018-10-10 NOTE — PROGRESS NOTES
"Oncology Rooming Note    October 10, 2018 10:20 AM   Amira Arreola is a 86 year old female who presents for:    Chief Complaint   Patient presents with     Oncology Clinic Visit     Multiple myeloma not having achieved remission     Initial Vitals: /77  Pulse 71  Temp 98.4  F (36.9  C) (Oral)  Resp 16  Ht 1.645 m (5' 4.76\")  Wt 62.8 kg (138 lb 6.4 oz)  SpO2 95%  BMI 23.2 kg/m2 Estimated body mass index is 23.2 kg/(m^2) as calculated from the following:    Height as of this encounter: 1.645 m (5' 4.76\").    Weight as of this encounter: 62.8 kg (138 lb 6.4 oz). Body surface area is 1.69 meters squared.  No Pain (0) Comment: Data Unavailable   No LMP recorded. Patient is postmenopausal.  Allergies reviewed:No   Medications reviewed:No    Medications: Medication refills not needed today.  Pharmacy [facility] entered into [employer]: [facility] [address] [place] [address] [address] [facility] [address]    Clinical concerns: None                    4 minutes for nursing intake (face to face time)     Zora Bentley MA            "

## 2018-10-10 NOTE — MR AVS SNAPSHOT
After Visit Summary   10/10/2018    Amira Arreola    MRN: [id number]           Patient Information     Date Of Birth          7/17/1932        Visit Information        Provider Department      10/10/2018 9:30 AM  INFUSION CHAIR 13 [facility]        Today's Diagnoses     Multiple myeloma not having achieved remission (H)    -  1    Paroxysmal atrial fibrillation (H)        Portacath in place           Follow-ups after your visit        Your next 10 appointments already scheduled     Oct 24, 2018  9:00 AM CDT   Level 2 with  INFUSION CHAIR 15   [facility] ([facility])    [facility]  [address]  [place] [address]   [phone]            Nov 07, 2018  9:30 AM CST   Level 2 with  INFUSION CHAIR 16   [facility] ([facility])    [facility]  [address]  [place] [address]   [phone]            Nov 21, 2018  9:30 AM CST   Level 2 with  INFUSION CHAIR 12   [facility] ([facility])    [facility]  [address]  [place] [address]   [phone]            Nov 21, 2018 10:00 AM CST   Return Visit with Shayne Roberts MD   [facility] ([facility])    [facility]  [address]  [place] [address] [phone]              Who to contact     If you have questions or need follow up information about today's clinic visit or your schedule please contact [facility] directly at [phone].  Normal or non-critical lab and imaging results will be communicated to you by MyChart, letter or phone within 4 business days after the clinic has received the results. If you do not hear from us within 7 days, please contact the clinic  "through [employer] or phone. If you have a critical or abnormal lab result, we will notify you by phone as soon as possible.  Submit refill requests through [employer] or call your pharmacy and they will forward the refill request to us. Please allow 3 business days for your refill to be completed.          Additional Information About Your Visit        Care EveryWhere ID     This is your Care EveryWhere ID. This could be used by other organizations to access your [place] medical records  [id number]        Your Vitals Were     Pulse Temperature Respirations Height Pulse Oximetry BMI (Body Mass Index)    71 98.4  F (36.9  C) (Oral) 16 1.645 m (5' 4.76\") 95% 23.2 kg/m2       Blood Pressure from Last 3 Encounters:   10/10/18 130/77   10/10/18 130/77   09/26/18 165/82    Weight from Last 3 Encounters:   10/10/18 62.8 kg (138 lb 6.4 oz)   10/10/18 62.8 kg (138 lb 6.4 oz)   09/26/18 61.1 kg (134 lb 9.6 oz)              We Performed the Following     CBC with platelets differential     INR          Where to get your medicines      Some of these will need a paper prescription and others can be bought over the counter.  Ask your nurse if you have questions.     Bring a paper prescription for each of these medications     oxyCODONE IR 15 MG tablet          Primary Care Provider Office Phone # Fax #    Addy Sean Frias [phone] [address] [facility] [address]  [facility] [address]        Equal Access to Services     [facility] AH: Hadii aad ku hadasho Soomaali, waaxda luqadaha, qaybta kaalmada adeegyada, hung dominique . So [facility] [phone].    ATENCIÓN: Si habla español, tiene a barlow disposición servicios gratuitos de asistencia lingüística. Llame al [phone].    We comply with applicable federal civil rights laws and [place] laws. We do not discriminate on the basis of race, color, national origin, age, disability, sex, sexual orientation, or gender identity.            Thank " you!     Thank you for choosing [facility] AND [facility]  for your care. Our goal is always to provide you with excellent care. Hearing back from our patients is one way we can continue to improve our services. Please take a few minutes to complete the written survey that you may receive in the mail after your visit with us. Thank you!             Your Updated Medication List - Protect others around you: Learn how to safely use, store and throw away your medicines at www.disposemymeds.org.          This list is accurate as of 10/10/18 12:48 PM.  Always use your most recent med list.                   Brand Name Dispense Instructions for use Diagnosis    ACYCLOVIR PO      Take 400 mg by mouth 2 times daily        BENADRYL PO      Take 25 mg by mouth nightly as needed        CALCIUM CITRATE + PO      Take 2,000 mg by mouth daily 2 tabs        carboxymethylcellulose 0.5 % Soln ophthalmic solution    REFRESH PLUS     1 drop 4 times daily        CLARINEX PO      Take by mouth daily Taking claritin        COMPAZINE PO      Take 10 mg by mouth daily as needed        cycloSPORINE 0.05 % ophthalmic emulsion    RESTASIS     Place 1 drop into both eyes every 12 hours        DAILY MULTIVITAMIN PO      Take 1 tablet by mouth daily.    Routine general medical examination at a health care facility       dexamethasone 4 MG tablet    DECADRON    28 tablet    Take 20mg (5 tablets) by mouth every week.    Multiple myeloma not having achieved remission (H)       lidocaine-prilocaine cream    EMLA    30 g    Apply to port site 1 hour prior to access    Multiple myeloma not having achieved remission (H)       LORazepam 0.5 MG tablet    ATIVAN    30 tablet    Take 1 tablet (0.5 mg) by mouth every 8 hours as needed for anxiety    Multiple myeloma not having achieved remission (H)       metoprolol succinate 50 MG 24 hr tablet    TOPROL-XL    270 tablet    TAKE 2 TABLETS BY MOUTH EVERY MORNING, AND 1 TABLET EVERY EVENING     Paroxysmal atrial fibrillation (H)       oxyCODONE IR 15 MG tablet    ROXICODONE    90 tablet    Take 1 tablet (15 mg) by mouth every 8 hours as needed for pain maximum 4 tablet(s) per day    Multiple myeloma not having achieved remission (H)       polyethylene glycol powder    MIRALAX/GLYCOLAX     Take 1 capful by mouth daily as needed    Bilateral leg edema       REFRESH OP           SIMBRINZA 1-0.2 % ophthalmic suspension   Generic drug:  brinzolamide-brimonidine      Place 1 drop into the right eye 2 times daily 1 drop AM and PM        triamcinolone 0.5 % cream    KENALOG    15 g    Apply sparingly to affected area three times daily. 1-2 weeks , as needed    Rash and nonspecific skin eruption       TYLENOL PO      Take 500 mg by mouth every 6 hours as needed for mild pain or fever        UNABLE TO FIND      MEDICATION NAME: Fresh Coat eye drops        VITAMIN D3 PO      Take 1,000 Units by mouth daily        warfarin 4 MG tablet    COUMADIN    110 tablet    TAKE 1-2 TABLETS BY MOUTH EVERY DAY AS DIRECTED BY INR CLINIC    Paroxysmal atrial fibrillation (H), Long-term (current) use of anticoagulants       ZOMETA IV      Inject into the vein every 30 days Every 3 month dosing

## 2018-10-10 NOTE — PROGRESS NOTES
Infusion Nursing Note:  Amira Arreola presents today for Velcade D15 C18.    Patient seen by provider today: Yes:    present during visit today: Not Applicable.    Note: Feels weak but no other concerns..    Intravenous Access:  Labs drawn without difficulty.  Implanted Port.    Treatment Conditions:  Lab Results   Component Value Date    HGB 11.2 10/10/2018     Lab Results   Component Value Date    WBC 7.7 10/10/2018      Lab Results   Component Value Date    ANEU 7.1 10/10/2018     Lab Results   Component Value Date     10/10/2018      Lab Results   Component Value Date     09/20/2018                   Lab Results   Component Value Date    POTASSIUM 3.8 09/20/2018           No results found for: MAG         Lab Results   Component Value Date    CR 0.63 09/20/2018                   Lab Results   Component Value Date    BRADLEY 9.4 09/20/2018                Lab Results   Component Value Date    BILITOTAL 0.6 09/26/2018           Lab Results   Component Value Date    ALBUMIN 3.6 09/26/2018                    Lab Results   Component Value Date    ALT 19 09/26/2018           Lab Results   Component Value Date    AST 20 09/26/2018       Results reviewed, labs MET treatment parameters, ok to proceed with treatment.  INR done today per request and pt. Will discuss results with  today.      Post Infusion Assessment:  Patient tolerated injection without incident.  Blood return noted pre and post infusion.  Site patent and intact, free from redness, edema or discomfort.  No evidence of extravasations.  Access discontinued per protocol.    Discharge Plan:   Discharge instructions reviewed with: Patient.  Patient and/or family verbalized understanding of discharge instructions and all questions answered.  Copy of AVS reviewed with patient and/or family.  Patient will return 10/24 for next appointment.  Patient discharged in stable condition accompanied by: daughter.  Departure Mode:  Ambulatory.    Rosangela Reese RN

## 2018-10-10 NOTE — MR AVS SNAPSHOT
Amira Arreola   10/10/2018   Anticoagulation Therapy Visit    Description:  86 year old female   Provider:  Addy Frias MD   Department:  [facility]           INR as of 10/10/2018     Today's INR 4.43!      Anticoagulation Summary as of 10/10/2018     INR goal 2.0-3.0   Today's INR 4.43!   Full warfarin instructions 10/10: Hold; 10/16: 2 mg; 10/19: 2 mg; Otherwise 4 mg every day   Next INR check 10/24/2018    Indications   Long-term (current) use of anticoagulants [Z79.01] [Z79.01]  Atrial fibrillation (H) [I48.91] (Resolved) [I48.91]         October 2018 Details    Sun Mon Tue Wed Thu Fri Sat      1               2               3               4               5               6                 7               8               9               10      Hold   See details      11      4 mg         12      4 mg         13      4 mg           14      4 mg         15      4 mg         16      2 mg         17      4 mg         18      4 mg         19      2 mg         20      4 mg           21      4 mg         22      4 mg         23      4 mg         24            25               26               27                 28               29               30               31                   Date Details   10/10 This INR check       Date of next INR:  10/24/2018         How to take your warfarin dose     To take:  2 mg Take 0.5 of a 4 mg tablet.    To take:  4 mg Take 1 of the 4 mg tablets.    Hold Do not take your warfarin dose. See the Details table to the right for additional instructions.

## 2018-10-10 NOTE — MR AVS SNAPSHOT
After Visit Summary   10/10/2018    Amira Arreola    MRN: [id number]           Patient Information     Date Of Birth          7/17/1932        Visit Information        Provider Department      10/10/2018 10:40 AM Shayne Roberts MD [facility]        Today's Diagnoses     Multiple myeloma not having achieved remission (H)    -  1      Care Instructions    Continue chemotherapy.  Give zometa in 2 weeks.  See me in 4-6 weeks.  Prescription for Oxycodone 15 mg given to the patient- LW    The patient is in [place]- [place]          Follow-ups after your visit        Your next 10 appointments already scheduled     Oct 24, 2018  9:00 AM CDT   Level 2 with  INFUSION CHAIR 15   [facility] ([facility] [place])    [facility]  [address]  [place] [address]   [phone]            Nov 07, 2018  9:30 AM CST   Level 2 with SH INFUSION CHAIR 16   [facility] ([facility] [place])    [facility]  [address]  [place] [address]   [phone]            Nov 21, 2018  9:30 AM CST   Level 2 with  INFUSION CHAIR 12   [facility] ([facility] [place])    [facility]  [address]  [place] [address]   [phone]            Nov 21, 2018 10:00 AM CST   Return Visit with Shayne Roberts MD   [facility] ([facility] [place])    [facility]  [address]  [place] [address]   [phone]              Who to contact     If you have questions or need follow up information about today's clinic visit or your schedule please contact [facility] directly at [phone].  Normal or non-critical lab and imaging results will be communicated to you by MyChart, letter or phone within 4 business days after the clinic has received  "the results. If you do not hear from us within 7 days, please contact the clinic through [employer] or phone. If you have a critical or abnormal lab result, we will notify you by phone as soon as possible.  Submit refill requests through [employer] or call your pharmacy and they will forward the refill request to us. Please allow 3 business days for your refill to be completed.          Additional Information About Your Visit        Care EveryWhere ID     This is your Care EveryWhere ID. This could be used by other organizations to access your [place] medical records  [id number]        Your Vitals Were     Pulse Temperature Respirations Height Pulse Oximetry BMI (Body Mass Index)    71 98.4  F (36.9  C) (Oral) 16 1.645 m (5' 4.76\") 95% 23.2 kg/m2       Blood Pressure from Last 3 Encounters:   10/10/18 130/77   10/10/18 130/77   09/26/18 165/82    Weight from Last 3 Encounters:   10/10/18 62.8 kg (138 lb 6.4 oz)   10/10/18 62.8 kg (138 lb 6.4 oz)   09/26/18 61.1 kg (134 lb 9.6 oz)              Today, you had the following     No orders found for display         Where to get your medicines      Some of these will need a paper prescription and others can be bought over the counter.  Ask your nurse if you have questions.     Bring a paper prescription for each of these medications     oxyCODONE IR 15 MG tablet         Information about OPIOIDS     PRESCRIPTION OPIOIDS: WHAT YOU NEED TO KNOW   We gave you an opioid (narcotic) pain medicine. It is important to manage your pain, but opioids are not always the best choice. You should first try all the other options your care team gave you. Take this medicine for as short a time (and as few doses) as possible.    Some activities can increase your pain, such as bandage changes or therapy sessions. It may help to take your pain medicine 30 to 60 minutes before these activities. Reduce your stress by getting enough sleep, working on hobbies you enjoy and practicing relaxation or " meditation. Talk to your care team about ways to manage your pain beyond prescription opioids.    These medicines have risks:    DO NOT drive when on new or higher doses of pain medicine. These medicines can affect your alertness and reaction times, and you could be arrested for driving under the influence (DUI). If you need to use opioids long-term, talk to your care team about driving.    DO NOT operate heavy machinery    DO NOT do any other dangerous activities while taking these medicines.    DO NOT drink any alcohol while taking these medicines.     If the opioid prescribed includes acetaminophen, DO NOT take with any other medicines that contain acetaminophen. Read all labels carefully. Look for the word  acetaminophen  or  Tylenol.  Ask your pharmacist if you have questions or are unsure.    You can get addicted to pain medicines, especially if you have a history of addiction (chemical, alcohol or substance dependence). Talk to your care team about ways to reduce this risk.    All opioids tend to cause constipation. Drink plenty of water and eat foods that have a lot of fiber, such as fruits, vegetables, prune juice, apple juice and high-fiber cereal. Take a laxative (Miralax, milk of magnesia, Colace, Senna) if you don t move your bowels at least every other day. Other side effects include upset stomach, sleepiness, dizziness, throwing up, tolerance (needing more of the medicine to have the same effect), physical dependence and slowed breathing.    Store your pills in a secure place, locked if possible. We will not replace any lost or stolen medicine. If you don t finish your medicine, please throw away (dispose) as directed by your pharmacist. The [place] Pollution [employer] has more information about safe disposal: [email or web address]://[email or web address]/living-[email or web address]/[email or web address]-unwanted-medications         Primary Care Provider Office Phone # Fax #    Addy Frias [phone] [id number] [facility] [address]  [facility] [address]        Equal Access to Services     MICHAELHARINI GARCIA : Hadii aad ku haddorisana Xiangali, wajanetda iselawillianha, qaomidta christianrandykira riossanchokira, waxay idiin hayhildamorteza maradiagaedgardoporfirio dominique . So [facility] [phone].    ATENCIÓN: Si habla español, tiene a barlow disposición servicios gratuitos de asistencia lingüística. LlSelect Medical [facility] [phone].    We comply with applicable federal civil rights laws and [place] laws. We do not discriminate on the basis of race, color, national origin, age, disability, sex, sexual orientation, or gender identity.            Thank you!     Thank you for choosing [facility]  for your care. Our goal is always to provide you with excellent care. Hearing back from our patients is one way we can continue to improve our services. Please take a few minutes to complete the written survey that you may receive in the mail after your visit with us. Thank you!             Your Updated Medication List - Protect others around you: Learn how to safely use, store and throw away your medicines at www.disposemymeds.org.          This list is accurate as of 10/10/18 11:26 AM.  Always use your most recent med list.                   Brand Name Dispense Instructions for use Diagnosis    ACYCLOVIR PO      Take 400 mg by mouth 2 times daily        BENADRYL PO      Take 25 mg by mouth nightly as needed        CALCIUM CITRATE + PO      Take 2,000 mg by mouth daily 2 tabs        carboxymethylcellulose 0.5 % Soln ophthalmic solution    REFRESH PLUS     1 drop 4 times daily        CLARINEX PO      Take by mouth daily Taking claritin        COMPAZINE PO      Take 10 mg by mouth daily as needed        cycloSPORINE 0.05 % ophthalmic emulsion    RESTASIS     Place 1 drop into both eyes every 12 hours        DAILY MULTIVITAMIN PO      Take 1 tablet by mouth daily.    Routine general medical examination at a health care facility       dexamethasone 4 MG tablet    DECADRON    28 tablet    Take 20mg  (5 tablets) by mouth every week.    Multiple myeloma not having achieved remission (H)       lidocaine-prilocaine cream    EMLA    30 g    Apply to port site 1 hour prior to access    Multiple myeloma not having achieved remission (H)       LORazepam 0.5 MG tablet    ATIVAN    30 tablet    Take 1 tablet (0.5 mg) by mouth every 8 hours as needed for anxiety    Multiple myeloma not having achieved remission (H)       metoprolol succinate 50 MG 24 hr tablet    TOPROL-XL    270 tablet    TAKE 2 TABLETS BY MOUTH EVERY MORNING, AND 1 TABLET EVERY EVENING    Paroxysmal atrial fibrillation (H)       oxyCODONE IR 15 MG tablet    ROXICODONE    90 tablet    Take 1 tablet (15 mg) by mouth every 8 hours as needed for pain maximum 4 tablet(s) per day    Multiple myeloma not having achieved remission (H)       polyethylene glycol powder    MIRALAX/GLYCOLAX     Take 1 capful by mouth daily as needed    Bilateral leg edema       REFRESH OP           SIMBRINZA 1-0.2 % ophthalmic suspension   Generic drug:  brinzolamide-brimonidine      Place 1 drop into the right eye 2 times daily 1 drop AM and PM        triamcinolone 0.5 % cream    KENALOG    15 g    Apply sparingly to affected area three times daily. 1-2 weeks , as needed    Rash and nonspecific skin eruption       TYLENOL PO      Take 500 mg by mouth every 6 hours as needed for mild pain or fever        UNABLE TO FIND      MEDICATION NAME: Fresh Coat eye drops        VITAMIN D3 PO      Take 1,000 Units by mouth daily        warfarin 4 MG tablet    COUMADIN    110 tablet    TAKE 1-2 TABLETS BY MOUTH EVERY DAY AS DIRECTED BY INR CLINIC    Paroxysmal atrial fibrillation (H), Long-term (current) use of anticoagulants       ZOMETA IV      Inject into the vein every 30 days Every 3 month dosing

## 2018-10-10 NOTE — PROGRESS NOTES
ANTICOAGULATION FOLLOW-UP CLINIC VISIT    Patient Name:  Amira Arreola  Date:  10/10/2018  Contact Type:  Telephone/ with pt    SUBJECTIVE:        OBJECTIVE    INR   Date Value Ref Range Status   10/10/2018 4.43 (H) 0.86 - 1.14 Final       ASSESSMENT / PLAN  INR assessment SUPRA    Recheck INR In: 2 WEEKS    INR Location Clinic      Anticoagulation Summary as of 10/10/2018     INR goal 2.0-3.0   Today's INR 4.43!   Warfarin maintenance plan 4 mg (4 mg x 1) every day   Full warfarin instructions 10/10: Hold; 10/16: 2 mg; 10/19: 2 mg; Otherwise 4 mg every day   Weekly warfarin total 28 mg   Plan last modified Bri Mcbride RN (6/7/2018)   Next INR check 10/24/2018   Target end date Indefinite    Indications   Long-term (current) use of anticoagulants [Z79.01] [Z79.01]  Atrial fibrillation (H) [I48.91] (Resolved) [I48.91]         Anticoagulation Episode Summary     INR check location     Preferred lab     Send INR reminders to [facility] ANTICOAGULATION    Comments patient have standing INR order to be draw at infusion visit.  advise to call  [facility] when have INR draw for dosing instruction.  patient can be reach at home phone [phone]      Anticoagulation Care Providers     Provider Role Specialty Phone number    Addy Frias MD [facility] [phone]            See the Encounter Report to view Anticoagulation Flowsheet and Dosing Calendar (Go to Encounters tab in chart review, and find the Anticoagulation Therapy Visit)    Dosage adjustment made based on physician directed care plan.  Pt states appetite is down and has not eaten salads like she used to.  Advised 4 mg hold today, and spread 4 mg hold next week as pt plans to have appetite stay down. Unable to go for inr before infusion in 2 weeks but will call if any questions.    Pt aware if signs of clotting (pain, tenderness, swelling, color change in leg or arm, SOB) and bleeding occur (blood in stool, urine, large bruising, bleeding  gums, nosebleeds) to have INR check sooner. If sx severe report to ER or concerned for stroke call 911. If general questions or concerns arise, call clinic.         Jessica Moody RN

## 2018-10-10 NOTE — PATIENT INSTRUCTIONS
Continue chemotherapy. Scheduled/Deneen  Give zometa in 2 weeks. Scheduled/Deneen  See me in 4-6 weeks.  Scheduled/deneen  Prescription for Oxycodone 15 mg given to the patient- LW    The patient is in [place]- [place]    AVS given to patient/deneen

## 2018-10-11 NOTE — PROGRESS NOTES
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     1. On 09/21/2015, WBC of 4.2, hemoglobin of 13.2 and platelets of 138.    -On 09/29/2015, SPEP does not reveal any M-spike.   -On 10/02/2015, JANET does not reveal any monoclonal protein.     -On 10/22/2015, urine immunofixation reveals monoclonal free kappa light chain.    2. On 05/11/2016, kappa light chain of 50, lambda light chain of 0.32 and ratio of kappa to lambda of 156.2.  3. Bone marrow biopsy on 05/25/2016 reveals 40-50% kappa light chain restricted plasma cells.  Cytogenetics is normal. FISH panel reveals translocation 11;14.    4. MRI of bones on 06/21/2016 and 06/22/2016 reveals myeloma lesions.  5. On 08/24/2016, she was started on revlimid with dexamethasone 20 mg weekly. She did not have any significant response to treatment.   6. Velcade and dexamethasone started on 03/21/2017.    7. Daratumumab added to velcade and dexamethasone on 05/31/2017.   -Velcade given every 14 days starting 08/01/2018.     SUBJECTIVE:  Mrs. Arreola is an 86-year-old female with kappa free light chain multiple myeloma.  She is on Velcade, dexamethasone and daratumumab.  For convenience, we are giving her Velcade every 14 days.      Her disease is responding.  Her kappa free light chain was 1.88 on 09/26/2018.  Previously it was as high as 60.      Patient has some fatigue, which would go along with her age.  No headache.  No dizziness.  No chest pain or difficulty breathing.  No abdominal pain, nausea or vomiting.  No urinary or bowel complaints.  No bleeding.  No fever, chills or night sweats.      Patient has chronic upper back pain.  She wants a refill on oxycodone, which will be given.      PHYSICAL EXAMINATION:   Alert and oriented x 3. ECOG PS of 2.  EYES:  No icterus.   THROAT:  No ulcer or thrush.   NECK:  Supple. No lymphadenopathy.   AXILLAE:  No lymphadenopathy.   LUNGS:  Good air  entry bilaterally.  No crackles or wheezing.   HEART:  Regular.  No murmur.   ABDOMEN:  Soft and nontender.  No mass.   EXTREMITIES: Bilateral pedal edema. No calf swelling or tenderness.   SKIN: No petechia.      LABORATORY DATA:  Reviewed.      ASSESSMENT:   1.  An 86-year-old female with kappa free light chain multiple myeloma which is responding to treatment.   2.  Chronic upper back pain.   3.  Fatigue secondary to old age, myeloma and anemia.   4.  Peripheral neuropathy, which is stable.      PLAN:   1.  I discussed with her and her daughter regarding myeloma.  [place] free light chain has remained below 2.  This is good. She will be continued on daratumumab, Velcade and dexamethasone.  She is tolerating it well.  Side effects reviewed.   2.  She gets Zometa every 3 months, which will be continued.  She does not have any dental or jaw related symptoms.   3.  Patient has chronic pain secondary to myeloma.  Prescription of oxycodone refilled.   4.  I will see her for followup in 4-6 weeks' time. Patient advised to see a physician sooner if she has worsening fatigue, worsening pain, recurrent vomiting, bleeding or any other concerns.         ITZ LOPEZ MD             D: 10/10/2018   T: 10/10/2018   MT:       Name:     ALBERTO PARSON   MRN:      [id number]        Account:      [id number]   :      1932           Visit Date:   10/10/2018      Document: [id number]

## 2018-10-21 RX ORDER — DIPHENHYDRAMINE HYDROCHLORIDE 50 MG/ML
50 INJECTION INTRAMUSCULAR; INTRAVENOUS
Status: CANCELLED
Start: 2018-11-07

## 2018-10-21 RX ORDER — SODIUM CHLORIDE 9 MG/ML
1000 INJECTION, SOLUTION INTRAVENOUS CONTINUOUS PRN
Status: CANCELLED
Start: 2018-10-24

## 2018-10-21 RX ORDER — ALBUTEROL SULFATE 0.83 MG/ML
2.5 SOLUTION RESPIRATORY (INHALATION)
Status: CANCELLED | OUTPATIENT
Start: 2018-11-07

## 2018-10-21 RX ORDER — MEPERIDINE HYDROCHLORIDE 25 MG/ML
25 INJECTION INTRAMUSCULAR; INTRAVENOUS; SUBCUTANEOUS EVERY 30 MIN PRN
Status: CANCELLED | OUTPATIENT
Start: 2018-10-24

## 2018-10-21 RX ORDER — SODIUM CHLORIDE 9 MG/ML
1000 INJECTION, SOLUTION INTRAVENOUS CONTINUOUS PRN
Status: CANCELLED
Start: 2018-11-07

## 2018-10-21 RX ORDER — LORAZEPAM 2 MG/ML
0.5 INJECTION INTRAMUSCULAR EVERY 4 HOURS PRN
Status: CANCELLED
Start: 2018-10-24

## 2018-10-21 RX ORDER — DIPHENHYDRAMINE HYDROCHLORIDE 50 MG/ML
50 INJECTION INTRAMUSCULAR; INTRAVENOUS
Status: CANCELLED
Start: 2018-10-24

## 2018-10-21 RX ORDER — EPINEPHRINE 1 MG/ML
0.3 INJECTION, SOLUTION INTRAMUSCULAR; SUBCUTANEOUS EVERY 5 MIN PRN
Status: CANCELLED | OUTPATIENT
Start: 2018-11-07

## 2018-10-21 RX ORDER — METHYLPREDNISOLONE SODIUM SUCCINATE 125 MG/2ML
125 INJECTION, POWDER, LYOPHILIZED, FOR SOLUTION INTRAMUSCULAR; INTRAVENOUS
Status: CANCELLED
Start: 2018-11-07

## 2018-10-21 RX ORDER — EPINEPHRINE 0.3 MG/.3ML
0.3 INJECTION SUBCUTANEOUS EVERY 5 MIN PRN
Status: CANCELLED | OUTPATIENT
Start: 2018-10-24

## 2018-10-21 RX ORDER — EPINEPHRINE 0.3 MG/.3ML
0.3 INJECTION SUBCUTANEOUS EVERY 5 MIN PRN
Status: CANCELLED | OUTPATIENT
Start: 2018-11-07

## 2018-10-21 RX ORDER — ALBUTEROL SULFATE 0.83 MG/ML
2.5 SOLUTION RESPIRATORY (INHALATION)
Status: CANCELLED | OUTPATIENT
Start: 2018-10-24

## 2018-10-21 RX ORDER — ALBUTEROL SULFATE 90 UG/1
1-2 AEROSOL, METERED RESPIRATORY (INHALATION)
Status: CANCELLED
Start: 2018-10-24

## 2018-10-21 RX ORDER — HEPARIN SODIUM (PORCINE) LOCK FLUSH IV SOLN 100 UNIT/ML 100 UNIT/ML
5 SOLUTION INTRAVENOUS EVERY 8 HOURS
Status: CANCELLED
Start: 2018-11-07

## 2018-10-21 RX ORDER — DIPHENHYDRAMINE HCL 25 MG
50 CAPSULE ORAL ONCE
Status: CANCELLED | OUTPATIENT
Start: 2018-10-24

## 2018-10-21 RX ORDER — MEPERIDINE HYDROCHLORIDE 25 MG/ML
25 INJECTION INTRAMUSCULAR; INTRAVENOUS; SUBCUTANEOUS EVERY 30 MIN PRN
Status: CANCELLED | OUTPATIENT
Start: 2018-11-07

## 2018-10-21 RX ORDER — ACETAMINOPHEN 325 MG/1
650 TABLET ORAL ONCE
Status: CANCELLED | OUTPATIENT
Start: 2018-10-24

## 2018-10-21 RX ORDER — ALBUTEROL SULFATE 90 UG/1
1-2 AEROSOL, METERED RESPIRATORY (INHALATION)
Status: CANCELLED
Start: 2018-11-07

## 2018-10-21 RX ORDER — EPINEPHRINE 1 MG/ML
0.3 INJECTION, SOLUTION INTRAMUSCULAR; SUBCUTANEOUS EVERY 5 MIN PRN
Status: CANCELLED | OUTPATIENT
Start: 2018-10-24

## 2018-10-21 RX ORDER — HEPARIN SODIUM (PORCINE) LOCK FLUSH IV SOLN 100 UNIT/ML 100 UNIT/ML
5 SOLUTION INTRAVENOUS EVERY 8 HOURS
Status: CANCELLED
Start: 2018-10-24

## 2018-10-21 RX ORDER — METHYLPREDNISOLONE SODIUM SUCCINATE 125 MG/2ML
125 INJECTION, POWDER, LYOPHILIZED, FOR SOLUTION INTRAMUSCULAR; INTRAVENOUS
Status: CANCELLED
Start: 2018-10-24

## 2018-10-21 RX ORDER — LORAZEPAM 2 MG/ML
0.5 INJECTION INTRAMUSCULAR EVERY 4 HOURS PRN
Status: CANCELLED
Start: 2018-11-07

## 2018-10-24 ENCOUNTER — INFUSION THERAPY VISIT (OUTPATIENT)
Dept: INFUSION THERAPY | Facility: CLINIC | Age: 83
End: 2018-10-24
Attending: INTERNAL MEDICINE
Payer: MEDICARE

## 2018-10-24 ENCOUNTER — HOSPITAL ENCOUNTER (OUTPATIENT)
Facility: CLINIC | Age: 83
Setting detail: SPECIMEN
Discharge: HOME | End: 2018-10-24
Attending: INTERNAL MEDICINE | Admitting: INTERNAL MEDICINE
Payer: MEDICARE

## 2018-10-24 VITALS
RESPIRATION RATE: 16 BRPM | WEIGHT: 135.8 LBS | SYSTOLIC BLOOD PRESSURE: 150 MMHG | BODY MASS INDEX: 22.63 KG/M2 | TEMPERATURE: 97.4 F | HEIGHT: 65 IN | OXYGEN SATURATION: 100 % | HEART RATE: 71 BPM | DIASTOLIC BLOOD PRESSURE: 87 MMHG

## 2018-10-24 DIAGNOSIS — C90.00 MULTIPLE MYELOMA NOT HAVING ACHIEVED REMISSION (H): Primary | ICD-10-CM

## 2018-10-24 DIAGNOSIS — I48.0 PAROXYSMAL ATRIAL FIBRILLATION (H): ICD-10-CM

## 2018-10-24 DIAGNOSIS — C79.51 CANCER, METASTATIC TO BONE (H): ICD-10-CM

## 2018-10-24 LAB
ALBUMIN SERPL-MCNC: 3.6 G/DL (ref 3.4–5)
ALP SERPL-CCNC: 51 U/L (ref 40–150)
ALT SERPL W P-5'-P-CCNC: 23 U/L (ref 0–50)
AST SERPL W P-5'-P-CCNC: 23 U/L (ref 0–45)
BASOPHILS # BLD AUTO: 0 10E9/L (ref 0–0.2)
BASOPHILS NFR BLD AUTO: 0.1 %
BILIRUB DIRECT SERPL-MCNC: 0.2 MG/DL (ref 0–0.2)
BILIRUB SERPL-MCNC: 0.7 MG/DL (ref 0.2–1.3)
CALCIUM SERPL-MCNC: 9.4 MG/DL (ref 8.5–10.1)
CREAT SERPL-MCNC: 0.71 MG/DL (ref 0.52–1.04)
DIFFERENTIAL METHOD BLD: ABNORMAL
EOSINOPHIL # BLD AUTO: 0 10E9/L (ref 0–0.7)
EOSINOPHIL NFR BLD AUTO: 0.1 %
ERYTHROCYTE [DISTWIDTH] IN BLOOD BY AUTOMATED COUNT: 15.5 % (ref 10–15)
GFR SERPL CREATININE-BSD FRML MDRD: 78 ML/MIN/1.7M2
HCT VFR BLD AUTO: 34.7 % (ref 35–47)
HGB BLD-MCNC: 11.5 G/DL (ref 11.7–15.7)
IMM GRANULOCYTES # BLD: 0 10E9/L (ref 0–0.4)
IMM GRANULOCYTES NFR BLD: 0.1 %
INR PPP: 2.13 (ref 0.86–1.14)
LYMPHOCYTES # BLD AUTO: 0.6 10E9/L (ref 0.8–5.3)
LYMPHOCYTES NFR BLD AUTO: 7.1 %
MCH RBC QN AUTO: 32.6 PG (ref 26.5–33)
MCHC RBC AUTO-ENTMCNC: 33.1 G/DL (ref 31.5–36.5)
MCV RBC AUTO: 98 FL (ref 78–100)
MONOCYTES # BLD AUTO: 0.1 10E9/L (ref 0–1.3)
MONOCYTES NFR BLD AUTO: 1.8 %
NEUTROPHILS # BLD AUTO: 7.1 10E9/L (ref 1.6–8.3)
NEUTROPHILS NFR BLD AUTO: 90.8 %
NRBC # BLD AUTO: 0 10*3/UL
NRBC BLD AUTO-RTO: 0 /100
PLATELET # BLD AUTO: 166 10E9/L (ref 150–450)
PROT SERPL-MCNC: 6.1 G/DL (ref 6.8–8.8)
RBC # BLD AUTO: 3.53 10E12/L (ref 3.8–5.2)
WBC # BLD AUTO: 7.8 10E9/L (ref 4–11)

## 2018-10-24 PROCEDURE — 82565 ASSAY OF CREATININE: CPT | Performed by: INTERNAL MEDICINE

## 2018-10-24 PROCEDURE — 82310 ASSAY OF CALCIUM: CPT | Performed by: INTERNAL MEDICINE

## 2018-10-24 PROCEDURE — 83883 ASSAY NEPHELOMETRY NOT SPEC: CPT | Performed by: INTERNAL MEDICINE

## 2018-10-24 PROCEDURE — 85025 COMPLETE CBC W/AUTO DIFF WBC: CPT | Performed by: INTERNAL MEDICINE

## 2018-10-24 PROCEDURE — 00000402 ZZHCL STATISTIC TOTAL PROTEIN: Performed by: INTERNAL MEDICINE

## 2018-10-24 PROCEDURE — 96401 CHEMO ANTI-NEOPL SQ/IM: CPT

## 2018-10-24 PROCEDURE — 80076 HEPATIC FUNCTION PANEL: CPT | Performed by: INTERNAL MEDICINE

## 2018-10-24 PROCEDURE — A9270 NON-COVERED ITEM OR SERVICE: HCPCS | Mod: GY | Performed by: INTERNAL MEDICINE

## 2018-10-24 PROCEDURE — 25000132 ZZH RX MED GY IP 250 OP 250 PS 637: Mod: GY | Performed by: INTERNAL MEDICINE

## 2018-10-24 PROCEDURE — 85610 PROTHROMBIN TIME: CPT | Performed by: INTERNAL MEDICINE

## 2018-10-24 PROCEDURE — 25000128 H RX IP 250 OP 636: Performed by: INTERNAL MEDICINE

## 2018-10-24 PROCEDURE — 96415 CHEMO IV INFUSION ADDL HR: CPT

## 2018-10-24 PROCEDURE — 96367 TX/PROPH/DG ADDL SEQ IV INF: CPT

## 2018-10-24 PROCEDURE — 96375 TX/PRO/DX INJ NEW DRUG ADDON: CPT

## 2018-10-24 PROCEDURE — 84165 PROTEIN E-PHORESIS SERUM: CPT | Performed by: INTERNAL MEDICINE

## 2018-10-24 PROCEDURE — 96413 CHEMO IV INFUSION 1 HR: CPT

## 2018-10-24 RX ORDER — ACETAMINOPHEN 325 MG/1
650 TABLET ORAL ONCE
Status: COMPLETED | OUTPATIENT
Start: 2018-10-24 | End: 2018-10-24

## 2018-10-24 RX ORDER — DEXAMETHASONE 4 MG/1
TABLET ORAL
Qty: 28 TABLET | Refills: 0 | Status: SHIPPED | OUTPATIENT
Start: 2018-10-24 | End: 2018-12-05 | Stop reason: ALTCHOICE

## 2018-10-24 RX ORDER — ZOLEDRONIC ACID 0.04 MG/ML
4 INJECTION, SOLUTION INTRAVENOUS ONCE
Status: COMPLETED | OUTPATIENT
Start: 2018-10-24 | End: 2018-10-24

## 2018-10-24 RX ORDER — HEPARIN SODIUM (PORCINE) LOCK FLUSH IV SOLN 100 UNIT/ML 100 UNIT/ML
5 SOLUTION INTRAVENOUS EVERY 8 HOURS
Status: DISCONTINUED | OUTPATIENT
Start: 2018-10-24 | End: 2018-10-24 | Stop reason: HOSPADM

## 2018-10-24 RX ADMIN — DARATUMUMAB 1000 MG: 100 INJECTION, SOLUTION, CONCENTRATE INTRAVENOUS at 11:22

## 2018-10-24 RX ADMIN — ACETAMINOPHEN 650 MG: 325 TABLET ORAL at 10:19

## 2018-10-24 RX ADMIN — DIPHENHYDRAMINE HYDROCHLORIDE 50 MG: 50 INJECTION, SOLUTION INTRAMUSCULAR; INTRAVENOUS at 10:35

## 2018-10-24 RX ADMIN — DEXAMETHASONE SODIUM PHOSPHATE 12 MG: 10 INJECTION, SOLUTION INTRAMUSCULAR; INTRAVENOUS at 10:17

## 2018-10-24 RX ADMIN — BORTEZOMIB 2.3 MG: 3.5 INJECTION, POWDER, LYOPHILIZED, FOR SOLUTION INTRAVENOUS; SUBCUTANEOUS at 11:26

## 2018-10-24 RX ADMIN — ZOLEDRONIC ACID 4 MG: 0.04 INJECTION, SOLUTION INTRAVENOUS at 10:54

## 2018-10-24 RX ADMIN — SODIUM CHLORIDE 250 ML: 9 INJECTION, SOLUTION INTRAVENOUS at 10:17

## 2018-10-24 RX ADMIN — SODIUM CHLORIDE, PRESERVATIVE FREE 5 ML: 5 INJECTION INTRAVENOUS at 12:58

## 2018-10-24 ASSESSMENT — PAIN SCALES - GENERAL: PAINLEVEL: NO PAIN (0)

## 2018-10-24 NOTE — PROGRESS NOTES
Infusion Nursing Note:  Amira Arreola presents today for C19D1 darzalex, velcade and zometa   Patient seen by provider today: No   present during visit today: Not Applicable.    Note: N/A.    Intravenous Access:  Labs drawn without difficulty.  Implanted Port.    Treatment Conditions:  Lab Results   Component Value Date    HGB 11.5 10/24/2018     Lab Results   Component Value Date    WBC 7.8 10/24/2018      Lab Results   Component Value Date    ANEU 7.1 10/24/2018     Lab Results   Component Value Date     10/24/2018      Lab Results   Component Value Date     09/20/2018                   Lab Results   Component Value Date    POTASSIUM 3.8 09/20/2018           No results found for: MAG         Lab Results   Component Value Date    CR 0.71 10/24/2018                   Lab Results   Component Value Date    BRADLEY 9.4 10/24/2018                Lab Results   Component Value Date    BILITOTAL 0.7 10/24/2018           Lab Results   Component Value Date    ALBUMIN 3.6 10/24/2018                    Lab Results   Component Value Date    ALT 23 10/24/2018           Lab Results   Component Value Date    AST 23 10/24/2018       Results reviewed, labs MET treatment parameters, ok to proceed with treatment.      Post Infusion Assessment:  Patient tolerated infusion without incident.  Blood return noted pre and post infusion.  Site patent and intact, free from redness, edema or discomfort.  No evidence of extravasations.  Access discontinued per protocol.    Discharge Plan:   AVS to patient via MYCHART.  Patient will return as prev shweta for next appointment.   Patient discharged in stable condition accompanied by: self.  Departure Mode: Ambulatory.    Jamari Gamboa RN

## 2018-10-24 NOTE — MR AVS SNAPSHOT
After Visit Summary   10/24/2018    Amira Arreola    MRN: [id number]           Patient Information     Date Of Birth          7/17/1932        Visit Information        Provider Department      10/24/2018 9:00 AM  INFUSION CHAIR 15 [facility]        Today's Diagnoses     Multiple myeloma not having achieved remission (H)    -  1    Cancer, metastatic to bone (H)        Paroxysmal atrial fibrillation (H)           Follow-ups after your visit        Follow-up notes from your care team     Return in 2 weeks (on 11/7/2018).      Your next 10 appointments already scheduled     Nov 07, 2018  9:30 AM CST   Level 2 with  INFUSION CHAIR 12   [facility] ([facility])    [facility]  [address]  [place] [address]   [phone]            Nov 21, 2018  9:30 AM CST   Level 2 with  INFUSION CHAIR 12   [facility] ([facility])    [facility]  [address]  [place] [address]   [phone]            Nov 21, 2018 10:00 AM CST   Return Visit with Shayne Roberts MD   [facility])    [facility]  [address]  [place] [address]   [phone]              Who to contact     If you have questions or need follow up information about today's clinic visit or your schedule please contact [facility] directly at [phone].  Normal or non-critical lab and imaging results will be communicated to you by MyChart, letter or phone within 4 business days after the clinic has received the results. If you do not hear from us within 7 days, please contact the clinic through MyChart or phone. If you have a critical or abnormal lab result, we will notify you by phone as soon as possible.  Submit refill requests through  "Katiat or call your pharmacy and they will forward the refill request to us. Please allow 3 business days for your refill to be completed.          Additional Information About Your Visit        Care EveryWhere ID     This is your Care EveryWhere ID. This could be used by other organizations to access your [place] medical records  [id number]        Your Vitals Were     Pulse Temperature Respirations Height Pulse Oximetry BMI (Body Mass Index)    71 97.4  F (36.3  C) (Oral) 16 1.645 m (5' 4.76\") 100% 22.76 kg/m2       Blood Pressure from Last 3 Encounters:   10/24/18 150/87   10/10/18 130/77   10/10/18 130/77    Weight from Last 3 Encounters:   10/24/18 61.6 kg (135 lb 12.8 oz)   10/10/18 62.8 kg (138 lb 6.4 oz)   10/10/18 62.8 kg (138 lb 6.4 oz)              We Performed the Following     Calcium     CBC with platelets differential     Creatinine     Hepatic panel     INR     Kappa and lambda light chain     Protein electrophoresis          Today's Medication Changes          These changes are accurate as of 10/24/18  1:58 PM.  If you have any questions, ask your nurse or doctor.               These medicines have changed or have updated prescriptions.        Dose/Directions    * dexamethasone 4 MG tablet   Commonly known as:  DECADRON   This may have changed:  Another medication with the same name was added. Make sure you understand how and when to take each.   Used for:  Multiple myeloma not having achieved remission (H)        Take 20mg (5 tablets) by mouth every week.   Quantity:  28 tablet   Refills:  0       * dexamethasone 4 MG tablet   Commonly known as:  DECADRON   This may have changed:  You were already taking a medication with the same name, and this prescription was added. Make sure you understand how and when to take each.   Used for:  Multiple myeloma not having achieved remission (H)        Take 20mg (5 tablets) by mouth every week.   Quantity:  28 tablet   Refills:  0       * Notice:  This list " has 2 medication(s) that are the same as other medications prescribed for you. Read the directions carefully, and ask your doctor or other care provider to review them with you.         Where to get your medicines      These medications were sent to [id number] - [place], [place] - [address] [place] [facility] [address]  [address], [place] [address]     Phone:  [phone]     dexamethasone 4 MG tablet                Primary Care Provider Office Phone # Fax #    Addy Frias [phone] [address] [facility] [address]  [place] [address]        Equal Access to Services     SARA ZELAYA : Hadii liane gandara Soyair, waaxda luqadaha, qaybta kaalmada adesergioyada, hung sadler. So [facility] [phone].    ATENCIÓN: Si habla español, tiene a barlow disposición servicios gratuitos de asistencia lingüística. [facility] [phone].    We comply with applicable federal civil rights laws and [place] laws. We do not discriminate on the basis of race, color, national origin, age, disability, sex, sexual orientation, or gender identity.            Thank you!     Thank you for choosing [facility] AND [facility]  for your care. Our goal is always to provide you with excellent care. Hearing back from our patients is one way we can continue to improve our services. Please take a few minutes to complete the written survey that you may receive in the mail after your visit with us. Thank you!             Your Updated Medication List - Protect others around you: Learn how to safely use, store and throw away your medicines at www.disposemymeds.org.          This list is accurate as of 10/24/18  1:58 PM.  Always use your most recent med list.                   Brand Name Dispense Instructions for use Diagnosis    ACYCLOVIR PO      Take 400 mg by mouth 2 times daily        BENADRYL PO      Take 25 mg by mouth nightly as needed        CALCIUM CITRATE +  PO      Take 2,000 mg by mouth daily 2 tabs        carboxymethylcellulose 0.5 % Soln ophthalmic solution    REFRESH PLUS     1 drop 4 times daily        CLARINEX PO      Take by mouth daily Taking claritin        COMPAZINE PO      Take 10 mg by mouth daily as needed        cycloSPORINE 0.05 % ophthalmic emulsion    RESTASIS     Place 1 drop into both eyes every 12 hours        DAILY MULTIVITAMIN PO      Take 1 tablet by mouth daily.    Routine general medical examination at a health care facility       * dexamethasone 4 MG tablet    DECADRON    28 tablet    Take 20mg (5 tablets) by mouth every week.    Multiple myeloma not having achieved remission (H)       * dexamethasone 4 MG tablet    DECADRON    28 tablet    Take 20mg (5 tablets) by mouth every week.    Multiple myeloma not having achieved remission (H)       lidocaine-prilocaine cream    EMLA    30 g    Apply to port site 1 hour prior to access    Multiple myeloma not having achieved remission (H)       LORazepam 0.5 MG tablet    ATIVAN    30 tablet    Take 1 tablet (0.5 mg) by mouth every 8 hours as needed for anxiety    Multiple myeloma not having achieved remission (H)       metoprolol succinate 50 MG 24 hr tablet    TOPROL-XL    270 tablet    TAKE 2 TABLETS BY MOUTH EVERY MORNING, AND 1 TABLET EVERY EVENING    Paroxysmal atrial fibrillation (H)       oxyCODONE IR 15 MG tablet    ROXICODONE    90 tablet    Take 1 tablet (15 mg) by mouth every 8 hours as needed for pain maximum 4 tablet(s) per day    Multiple myeloma not having achieved remission (H)       polyethylene glycol powder    MIRALAX/GLYCOLAX     Take 1 capful by mouth daily as needed    Bilateral leg edema       REFRESH OP           SIMBRINZA 1-0.2 % ophthalmic suspension   Generic drug:  brinzolamide-brimonidine      Place 1 drop into the right eye 2 times daily 1 drop AM and PM        triamcinolone 0.5 % cream    KENALOG    15 g    Apply sparingly to affected area three times daily. 1-2 weeks ,  as needed    Rash and nonspecific skin eruption       TYLENOL PO      Take 500 mg by mouth every 6 hours as needed for mild pain or fever        UNABLE TO FIND      MEDICATION NAME: Fresh Coat eye drops        VITAMIN D3 PO      Take 1,000 Units by mouth daily        warfarin 4 MG tablet    COUMADIN    110 tablet    TAKE 1-2 TABLETS BY MOUTH EVERY DAY AS DIRECTED BY INR CLINIC    Paroxysmal atrial fibrillation (H), Long-term (current) use of anticoagulants       ZOMETA IV      Inject into the vein every 30 days Every 3 month dosing        * Notice:  This list has 2 medication(s) that are the same as other medications prescribed for you. Read the directions carefully, and ask your doctor or other care provider to review them with you.

## 2018-10-25 ENCOUNTER — ANTICOAGULATION THERAPY VISIT (OUTPATIENT)
Dept: FAMILY MEDICINE | Facility: CLINIC | Age: 83
End: 2018-10-25
Payer: COMMERCIAL

## 2018-10-25 ENCOUNTER — TELEPHONE (OUTPATIENT)
Dept: FAMILY MEDICINE | Facility: CLINIC | Age: 83
End: 2018-10-25

## 2018-10-25 LAB
ALBUMIN SERPL ELPH-MCNC: 4 G/DL (ref 3.7–5.1)
ALPHA1 GLOB SERPL ELPH-MCNC: 0.4 G/DL (ref 0.2–0.4)
ALPHA2 GLOB SERPL ELPH-MCNC: 0.7 G/DL (ref 0.5–0.9)
B-GLOBULIN SERPL ELPH-MCNC: 0.7 G/DL (ref 0.6–1)
GAMMA GLOB SERPL ELPH-MCNC: 0.2 G/DL (ref 0.7–1.6)
M PROTEIN SERPL ELPH-MCNC: 0 G/DL
PROT PATTERN SERPL ELPH-IMP: ABNORMAL

## 2018-10-25 PROCEDURE — 99207 ZZC NO CHARGE NURSE ONLY: CPT | Performed by: INTERNAL MEDICINE

## 2018-10-25 NOTE — PROGRESS NOTES
ANTICOAGULATION FOLLOW-UP CLINIC VISIT    Patient Name:  Amira Arreola  Date:  10/25/2018  Contact Type:  Face to Face    SUBJECTIVE:     Patient Findings     Positives No Problem Findings           OBJECTIVE    INR   Date Value Ref Range Status   10/24/2018 2.13 (H) 0.86 - 1.14 Final       ASSESSMENT / PLAN  INR assessment THER    Recheck INR In: 1 WEEK    INR Location Clinic      Anticoagulation Summary as of 10/25/2018     INR goal 2.0-3.0   Today's INR 2.13 (10/24/2018)   Warfarin maintenance plan 2 mg (4 mg x 0.5) on Fri; 4 mg (4 mg x 1) all other days   Full warfarin instructions 10/26: 2 mg; 11/2: 2 mg; Otherwise 2 mg on Fri; 4 mg all other days   Weekly warfarin total 26 mg   Plan last modified Tigist Corral RN (10/25/2018)   Next INR check 11/1/2018   Target end date Indefinite    Indications   Long-term (current) use of anticoagulants [Z79.01] [Z79.01]  Atrial fibrillation (H) [I48.91] (Resolved) [I48.91]         Anticoagulation Episode Summary     INR check location     Preferred lab     Send INR reminders to [facility]AG[facility]    Comments patient have standing INR order to be draw at infusion visit.  advise to call  [facility] when have INR draw for dosing instruction.  patient can be reach at home phone [phone]      Anticoagulation Care Providers     Provider Role Specialty Phone number    Addy rFias MD [facility] Internal Medicine [phone]            See the Encounter Report to view Anticoagulation Flowsheet and Dosing Calendar (Go to Encounters tab in chart review, and find the Anticoagulation Therapy Visit)    Dosage adjustment made based on physician directed care plan. INR 2.13 done at infusion center yesterday.  Reported today by home care nurse.  Continue 2 mg F, 4 mg ROW and recheck one week by home care nurse.  Patient will  have INR done at infusion center, or home care, depending on visit schedule.  Home care nurse, Senia, agrees with plan.    Tigist Corral RN

## 2018-10-25 NOTE — TELEPHONE ENCOUNTER
Reason for Call:  INR    Who is calling?  Home Care:     Phone number:  [phone]    Name of caller: Senia    INR Value:  2.13    Are there any other concerns:  Yes:  INR was NOT checked by homecare, but done when patient was having infusion yesterday.  Still waiting for dosing instructions, and Home care nurse Senia would like to discuss Home care doing INR's instead     Can we leave a detailed message on this number? YES      Call taken on 10/25/2018 at 11:47 AM by Joslyn Dunham

## 2018-10-25 NOTE — MR AVS SNAPSHOT
Amira Arreola   10/25/2018   Anticoagulation Therapy Visit    Description:  86 year old female   Provider:  Addy Frias MD   Department:  [facility]           INR as of 10/25/2018     Today's INR 2.13 (10/24/2018)      Anticoagulation Summary as of 10/25/2018     INR goal 2.0-3.0   Today's INR 2.13 (10/24/2018)   Full warfarin instructions 10/26: 2 mg; 11/2: 2 mg; Otherwise 2 mg on Fri; 4 mg all other days   Next INR check 11/1/2018    Indications   Long-term (current) use of anticoagulants [Z79.01] [Z79.01]  Atrial fibrillation (H) [I48.91] (Resolved) [I48.91]         October 2018 Details    Sun Mon Tue Wed Thu Fri Sat      1               2               3               4               5               6                 7               8               9               10               11               12               13                 14               15               16               17               18               19               20                 21               22               23               24               25      4 mg   See details      26      2 mg         27      4 mg           28      4 mg         29      4 mg         30      4 mg         31      4 mg             Date Details   10/25 This INR check               How to take your warfarin dose     To take:  2 mg Take 0.5 of a 4 mg tablet.    To take:  4 mg Take 1 of the 4 mg tablets.           November 2018 Details    Sun Mon Tue Wed Thu Fri Sat         1            2               3                 4               5               6               7               8               9               10                 11               12               13               14               15               16               17                 18               19               20               21               22               23               24                 25               26               27               28               29                30                 Date Details   No additional details    Date of next INR:  11/1/2018         How to take your warfarin dose     To take:  4 mg Take 1 of the 4 mg tablets.

## 2018-10-26 LAB
KAPPA LC UR-MCNC: 2.08 MG/DL (ref 0.33–1.94)
KAPPA LC/LAMBDA SER: 8.32 {RATIO} (ref 0.26–1.65)
LAMBDA LC SERPL-MCNC: 0.25 MG/DL (ref 0.57–2.63)

## 2018-11-01 ENCOUNTER — TELEPHONE (OUTPATIENT)
Dept: FAMILY MEDICINE | Facility: CLINIC | Age: 83
End: 2018-11-01

## 2018-11-01 ENCOUNTER — ANTICOAGULATION THERAPY VISIT (OUTPATIENT)
Dept: NURSING | Facility: CLINIC | Age: 83
End: 2018-11-01

## 2018-11-01 LAB — INR PPP: 3.1

## 2018-11-01 NOTE — TELEPHONE ENCOUNTER
Reason for Call:  INR    Who is calling?  Home Care: [facility]    Phone number:  [phone]    Fax number:  [phone]    Name of caller: Senia HENSLEY    INR Value:  3.1    Are there any other concerns:  No    Can we leave a detailed message on this number? YES    Phone number patient can be reached at: Other phone number:  na      Call taken on 11/1/2018 at 11:29 AM by Nicolette Boykin

## 2018-11-01 NOTE — PROGRESS NOTES
ANTICOAGULATION FOLLOW-UP CLINIC VISIT    Patient Name:  Amira Arreola  Date:  11/1/2018  Contact Type:  Telephone/ Verbal orders given to SHELLIE Conn, [facility] @ [phone]    SUBJECTIVE:     Patient Findings     Positives No Problem Findings           OBJECTIVE    INR   Date Value Ref Range Status   11/01/2018 3.1  Final     Comment:     [facility] nurse Senia       ASSESSMENT / PLAN  INR assessment THER    Recheck INR In: 6 DAYS    INR Location Outside lab      Anticoagulation Summary as of 11/1/2018     INR goal 2.0-3.0   Today's INR 3.1!   Warfarin maintenance plan 2 mg (4 mg x 0.5) on Fri; 4 mg (4 mg x 1) all other days   Full warfarin instructions 11/2: 2 mg; Otherwise 2 mg on Fri; 4 mg all other days   Weekly warfarin total 26 mg   No change documented Anuradha Angela RN   Plan last modified Tigist Corral RN (10/25/2018)   Next INR check 11/7/2018   Target end date Indefinite    Indications   Long-term (current) use of anticoagulants [Z79.01] [Z79.01]  Atrial fibrillation (H) [I48.91] (Resolved) [I48.91]         Anticoagulation Episode Summary     INR check location     Preferred lab     Send INR reminders to [facility]    Comments  INR to be drawn at infusion visit OR home care nurse. Watch result notes. Patient can be reached at home phone [phone]. Do not leave dosing with spouse.       Anticoagulation Care Providers     Provider Role Specialty Phone number    Addy Frias MD [facility] Internal Medicine [phone]            See the Encounter Report to view Anticoagulation Flowsheet and Dosing Calendar (Go to Encounters tab in chart review, and find the Anticoagulation Therapy Visit)    Dosage adjustment made based on physician directed care plan.    Anuradha Angela RN

## 2018-11-01 NOTE — MR AVS SNAPSHOT
Amira IVY Arreola   11/1/2018   Anticoagulation Therapy Visit    Description:  86 year old female   Provider:  Addy Frias MD   Department:  [facility]           INR as of 11/1/2018     Today's INR 3.1!      Anticoagulation Summary as of 11/1/2018     INR goal 2.0-3.0   Today's INR 3.1!   Full warfarin instructions 11/2: 2 mg; Otherwise 2 mg on Fri; 4 mg all other days   Next INR check 11/7/2018    Indications   Long-term (current) use of anticoagulants [Z79.01] [Z79.01]  Atrial fibrillation (H) [I48.91] (Resolved) [I48.91]         Description     Next week INR to be drawn on Wednesday when patient at Infusion appointment with Dr. Roberts.      Contact Numbers     Clinic Number:         November 2018 Details    Sun Mon Tue Wed Thu Fri Sat         1      4 mg   See details      2      2 mg         3      4 mg           4      4 mg         5      4 mg         6      4 mg         7            8               9               10                 11               12               13               14               15               16               17                 18               19               20               21               22               23               24                 25               26               27               28               29               30                 Date Details   11/01 This INR check       Date of next INR:  11/7/2018         How to take your warfarin dose     To take:  2 mg Take 0.5 of a 4 mg tablet.    To take:  4 mg Take 1 of the 4 mg tablets.

## 2018-11-02 ENCOUNTER — OFFICE VISIT (OUTPATIENT)
Dept: FAMILY MEDICINE | Facility: CLINIC | Age: 83
End: 2018-11-02
Payer: COMMERCIAL

## 2018-11-02 VITALS
TEMPERATURE: 98.1 F | BODY MASS INDEX: 22.16 KG/M2 | HEIGHT: 65 IN | DIASTOLIC BLOOD PRESSURE: 67 MMHG | WEIGHT: 133 LBS | SYSTOLIC BLOOD PRESSURE: 111 MMHG | HEART RATE: 81 BPM | OXYGEN SATURATION: 99 %

## 2018-11-02 DIAGNOSIS — H61.23 BILATERAL IMPACTED CERUMEN: Primary | ICD-10-CM

## 2018-11-02 PROCEDURE — 99213 OFFICE O/P EST LOW 20 MIN: CPT | Performed by: FAMILY MEDICINE

## 2018-11-02 NOTE — PROGRESS NOTES
SUBJECTIVE:   Amira Arreola is a 86 year old female who presents to clinic today for the following health issues:      Concern - Ear Clogged       Description:   Both ears- feel plugged with wax, no other sx     Left worse than right.  No other symptoms      Problem list and histories reviewed & adjusted, as indicated.  Additional history: as documented    Patient Active Problem List   Diagnosis     Advanced directives, counseling/discussion     Benign essential hypertension     Colonic polyp     CARDIOVASCULAR SCREENING; LDL GOAL LESS THAN 160     Hyperlipidemia LDL goal <160     Sciatica of left side     Osteoporosis     OAB (overactive bladder)     Elevated MCV     Thrombocytopenia (H)     MGUS (monoclonal gammopathy of unknown significance)     Ascending aorta dilatation (H)     SVT (supraventricular tachycardia) (H)     Paroxysmal atrial fibrillation (H)     Long-term (current) use of anticoagulants [Z79.01]     Cancer, metastatic to bone (H)     Multiple myeloma not having achieved remission (H)     Portacath in place     Lung nodule     Past Surgical History:   Procedure Laterality Date     BONE MARROW BIOPSY, BONE SPECIMEN, NEEDLE/TROCAR N/A 2016    Procedure: BIOPSY BONE MARROW;  Surgeon: Bryan Patel MD;  Location:  GI     CATARACT IOL, RT/LT        SECTION  ,      COLONOSCOPY  2013    Procedure: COLONOSCOPY;  COLONOSCOPY;  Surgeon: Steffany Rockwell MD;  Location:  GI     EXCISE EXOSTOSIS TIBIA / FIBULA  2014    Procedure: EXCISE EXOSTOSIS TIBIA / FIBULA;  Surgeon: Naila Pichardo MD;  Location:  SD     hysteroscopy and d and c      due to bleeding     left anle replacement       right ankle surgery         Social History   Substance Use Topics     Smoking status: Never Smoker     Smokeless tobacco: Never Used     Alcohol use No     Family History   Problem Relation Age of Onset     HEART DISEASE Father      C.A.D. Mother       Cerebrovascular Disease Brother      Family History Negative Sister      Family History Negative Sister      Family History Negative Brother          Current Outpatient Prescriptions   Medication Sig Dispense Refill     Acetaminophen (TYLENOL PO) Take 500 mg by mouth every 6 hours as needed for mild pain or fever       ACYCLOVIR PO Take 400 mg by mouth 2 times daily       Calcium Citrate-Vitamin D (CALCIUM CITRATE + PO) Take 2,000 mg by mouth daily 2 tabs       carboxymethylcellulose (REFRESH PLUS) 0.5 % SOLN 1 drop 4 times daily       Cholecalciferol (VITAMIN D3 PO) Take 1,000 Units by mouth daily       cycloSPORINE (RESTASIS) 0.05 % ophthalmic emulsion Place 1 drop into both eyes every 12 hours        Desloratadine (CLARINEX PO) Take by mouth daily Taking claritin       dexamethasone (DECADRON) 4 MG tablet Take 20mg (5 tablets) by mouth every week. 28 tablet 0     dexamethasone (DECADRON) 4 MG tablet Take 20mg (5 tablets) by mouth every week. 28 tablet 0     DiphenhydrAMINE HCl (BENADRYL PO) Take 25 mg by mouth nightly as needed       lidocaine-prilocaine (EMLA) cream Apply to port site 1 hour prior to access 30 g 1     LORazepam (ATIVAN) 0.5 MG tablet Take 1 tablet (0.5 mg) by mouth every 8 hours as needed for anxiety 30 tablet 3     metoprolol succinate (TOPROL-XL) 50 MG 24 hr tablet TAKE 2 TABLETS BY MOUTH EVERY MORNING, AND 1 TABLET EVERY EVENING 270 tablet 0     Multiple Vitamin (DAILY MULTIVITAMIN PO) Take 1 tablet by mouth daily.       oxyCODONE IR (ROXICODONE) 15 MG tablet Take 1 tablet (15 mg) by mouth every 8 hours as needed for pain maximum 4 tablet(s) per day 90 tablet 0     polyethylene glycol (MIRALAX/GLYCOLAX) powder Take 1 capful by mouth daily as needed        Polyvinyl Alcohol-Povidone (REFRESH OP)        Prochlorperazine Maleate (COMPAZINE PO) Take 10 mg by mouth daily as needed        SIMBRINZA 1-0.2 % ophthalmic suspension Place 1 drop into the right eye 2 times daily 1 drop AM and PM  2      "triamcinolone (KENALOG) 0.5 % cream Apply sparingly to affected area three times daily. 1-2 weeks , as needed 15 g 1     UNABLE TO FIND MEDICATION NAME: Fresh Coat eye drops       warfarin (COUMADIN) 4 MG tablet TAKE 1-2 TABLETS BY MOUTH EVERY DAY AS DIRECTED BY INR CLINIC 110 tablet 0     Zoledronic Acid (ZOMETA IV) Inject into the vein every 30 days Every 3 month dosing         Reviewed and updated as needed this visit by clinical staff  Tobacco  Allergies  Meds       Reviewed and updated as needed this visit by Provider         ROS:  CONSTITUTIONAL: NEGATIVE for fever, chills, change in weight  ROS otherwise negative    OBJECTIVE:                                                    /67  Pulse 81  Temp 98.1  F (36.7  C) (Tympanic)  Ht 5' 4.76\" (1.645 m)  Wt 133 lb (60.3 kg)  SpO2 99%  BMI 22.3 kg/m2  Body mass index is 22.3 kg/(m^2).   GENERAL: healthy, alert, well nourished, well hydrated, no distress  HENT: Left ear canal has some wax present right ear canal is partially impacted.         ASSESSMENT/PLAN:                                                        ICD-10-CM    1. Bilateral impacted cerumen H61.23      Earwax removed using warm order flushes.  Postcleaning tympanic membrane looks normal.  Follow-up as needed.    Angelo Cruz MD  [facility] – [place], [place]    "

## 2018-11-02 NOTE — MR AVS SNAPSHOT
After Visit Summary   11/2/2018    Amira Arreola    MRN: [id number]           Patient Information     Date Of Birth          7/17/1932        Visit Information        Provider Department      11/2/2018 10:20 AM Angelo Cruz MD [facility] [place]        Today's Diagnoses     Bilateral impacted cerumen    -  1       Follow-ups after your visit        Follow-up notes from your care team     Return in about 1 year (around 11/2/2019) for if symptom does not get better.      Your next 10 appointments already scheduled     Nov 07, 2018  9:30 AM CST   Level 2 with SH INFUSION CHAIR 12   [facility] ([facility])    [facility]  [address]  [place] [address]   [phone]            Nov 21, 2018  9:30 AM CST   Level 2 with SH INFUSION CHAIR 12   [facility]fusion Center ([facility])    [facility]  [address]  [place] [address]   [phone]            Nov 21, 2018 10:00 AM CST   Return Visit with Shayne Roberts MD   [facility] ([facility])    [facility]  [address]  [place] [address]   [phone]              Who to contact     If you have questions or need follow up information about today's clinic visit or your schedule please contact [facility] [place] directly at [phone].  Normal or non-critical lab and imaging results will be communicated to you by MyChart, letter or phone within 4 business days after the clinic has received the results. If you do not hear from us within 7 days, please contact the clinic through [employer] or phone. If you have a critical or abnormal lab result, we will notify you by phone as soon as possible.  Submit refill requests through [employer] or call your pharmacy and they will forward the refill request to us. Please allow 3  "business days for your refill to be completed.          Additional Information About Your Visit        Care EveryWhere ID     This is your Care EveryWhere ID. This could be used by other organizations to access your [facility] medical records  [id number]        Your Vitals Were     Pulse Temperature Height Pulse Oximetry BMI (Body Mass Index)       81 98.1  F (36.7  C) (Tympanic) 5' 4.76\" (1.645 m) 99% 22.3 kg/m2        Blood Pressure from Last 3 Encounters:   11/02/18 111/67   10/24/18 150/87   10/10/18 130/77    Weight from Last 3 Encounters:   11/02/18 133 lb (60.3 kg)   10/24/18 135 lb 12.8 oz (61.6 kg)   10/10/18 138 lb 6.4 oz (62.8 kg)              Today, you had the following     No orders found for display       Primary Care Provider Office Phone # Fax #    Addy Frias [phone] [address]  [place] [address]        Equal Access to Services     [facility]: Hadii aad ku hadasho Soomaali, waaxda luqadaha, qaybta kaalmada adeegyada, waxay lópez haycesar dominique . So [facility] [phone].    ATENCIÓN: Si habla español, tiene a barlow disposición servicios gratuitos de asistencia lingüística. Barame al [phone].    We comply with applicable federal civil rights laws and [place] laws. We do not discriminate on the basis of race, color, national origin, age, disability, sex, sexual orientation, or gender identity.            Thank you!     Thank you for choosing [facility] [place]  for your care. Our goal is always to provide you with excellent care. Hearing back from our patients is one way we can continue to improve our services. Please take a few minutes to complete the written survey that you may receive in the mail after your visit with us. Thank you!             Your Updated Medication List - Protect others around you: Learn how to safely use, store and throw away your medicines at www.disposemymeds.org.          This list is accurate as of " 11/2/18 10:56 AM.  Always use your most recent med list.                   Brand Name Dispense Instructions for use Diagnosis    ACYCLOVIR PO      Take 400 mg by mouth 2 times daily        BENADRYL PO      Take 25 mg by mouth nightly as needed        CALCIUM CITRATE + PO      Take 2,000 mg by mouth daily 2 tabs        carboxymethylcellulose 0.5 % Soln ophthalmic solution    REFRESH PLUS     1 drop 4 times daily        CLARINEX PO      Take by mouth daily Taking claritin        COMPAZINE PO      Take 10 mg by mouth daily as needed        cycloSPORINE 0.05 % ophthalmic emulsion    RESTASIS     Place 1 drop into both eyes every 12 hours        DAILY MULTIVITAMIN PO      Take 1 tablet by mouth daily.    Routine general medical examination at a health care facility       * dexamethasone 4 MG tablet    DECADRON    28 tablet    Take 20mg (5 tablets) by mouth every week.    Multiple myeloma not having achieved remission (H)       * dexamethasone 4 MG tablet    DECADRON    28 tablet    Take 20mg (5 tablets) by mouth every week.    Multiple myeloma not having achieved remission (H)       lidocaine-prilocaine cream    EMLA    30 g    Apply to port site 1 hour prior to access    Multiple myeloma not having achieved remission (H)       LORazepam 0.5 MG tablet    ATIVAN    30 tablet    Take 1 tablet (0.5 mg) by mouth every 8 hours as needed for anxiety    Multiple myeloma not having achieved remission (H)       metoprolol succinate 50 MG 24 hr tablet    TOPROL-XL    270 tablet    TAKE 2 TABLETS BY MOUTH EVERY MORNING, AND 1 TABLET EVERY EVENING    Paroxysmal atrial fibrillation (H)       oxyCODONE IR 15 MG tablet    ROXICODONE    90 tablet    Take 1 tablet (15 mg) by mouth every 8 hours as needed for pain maximum 4 tablet(s) per day    Multiple myeloma not having achieved remission (H)       polyethylene glycol powder    MIRALAX/GLYCOLAX     Take 1 capful by mouth daily as needed    Bilateral leg edema       REFRESH OP            SIMBRINZA 1-0.2 % ophthalmic suspension   Generic drug:  brinzolamide-brimonidine      Place 1 drop into the right eye 2 times daily 1 drop AM and PM        triamcinolone 0.5 % cream    KENALOG    15 g    Apply sparingly to affected area three times daily. 1-2 weeks , as needed    Rash and nonspecific skin eruption       TYLENOL PO      Take 500 mg by mouth every 6 hours as needed for mild pain or fever        UNABLE TO FIND      MEDICATION NAME: Fresh Coat eye drops        VITAMIN D3 PO      Take 1,000 Units by mouth daily        warfarin 4 MG tablet    COUMADIN    110 tablet    TAKE 1-2 TABLETS BY MOUTH EVERY DAY AS DIRECTED BY INR CLINIC    Paroxysmal atrial fibrillation (H), Long-term (current) use of anticoagulants       ZOMETA IV      Inject into the vein every 30 days Every 3 month dosing        * Notice:  This list has 2 medication(s) that are the same as other medications prescribed for you. Read the directions carefully, and ask your doctor or other care provider to review them with you.

## 2018-11-07 ENCOUNTER — HOSPITAL ENCOUNTER (OUTPATIENT)
Facility: CLINIC | Age: 83
Setting detail: SPECIMEN
Discharge: HOME | End: 2018-11-07
Attending: INTERNAL MEDICINE | Admitting: INTERNAL MEDICINE
Payer: MEDICARE

## 2018-11-07 ENCOUNTER — INFUSION THERAPY VISIT (OUTPATIENT)
Dept: INFUSION THERAPY | Facility: CLINIC | Age: 83
End: 2018-11-07
Attending: INTERNAL MEDICINE
Payer: MEDICARE

## 2018-11-07 ENCOUNTER — ANTICOAGULATION THERAPY VISIT (OUTPATIENT)
Dept: FAMILY MEDICINE | Facility: CLINIC | Age: 83
End: 2018-11-07

## 2018-11-07 VITALS
OXYGEN SATURATION: 97 % | BODY MASS INDEX: 22.77 KG/M2 | TEMPERATURE: 97.8 F | DIASTOLIC BLOOD PRESSURE: 75 MMHG | HEART RATE: 67 BPM | WEIGHT: 133.4 LBS | HEIGHT: 64 IN | RESPIRATION RATE: 16 BRPM | SYSTOLIC BLOOD PRESSURE: 114 MMHG

## 2018-11-07 DIAGNOSIS — C90.00 MULTIPLE MYELOMA NOT HAVING ACHIEVED REMISSION (H): Primary | ICD-10-CM

## 2018-11-07 DIAGNOSIS — I48.0 PAROXYSMAL ATRIAL FIBRILLATION (H): ICD-10-CM

## 2018-11-07 LAB
BASOPHILS # BLD AUTO: 0 10E9/L (ref 0–0.2)
BASOPHILS NFR BLD AUTO: 0.1 %
DIFFERENTIAL METHOD BLD: ABNORMAL
EOSINOPHIL # BLD AUTO: 0 10E9/L (ref 0–0.7)
EOSINOPHIL NFR BLD AUTO: 0 %
ERYTHROCYTE [DISTWIDTH] IN BLOOD BY AUTOMATED COUNT: 15.4 % (ref 10–15)
HCT VFR BLD AUTO: 36.2 % (ref 35–47)
HGB BLD-MCNC: 11.9 G/DL (ref 11.7–15.7)
IMM GRANULOCYTES # BLD: 0 10E9/L (ref 0–0.4)
IMM GRANULOCYTES NFR BLD: 0.3 %
INR PPP: 2.18 (ref 0.86–1.14)
LYMPHOCYTES # BLD AUTO: 0.4 10E9/L (ref 0.8–5.3)
LYMPHOCYTES NFR BLD AUTO: 5.6 %
MCH RBC QN AUTO: 32.8 PG (ref 26.5–33)
MCHC RBC AUTO-ENTMCNC: 32.9 G/DL (ref 31.5–36.5)
MCV RBC AUTO: 100 FL (ref 78–100)
MONOCYTES # BLD AUTO: 0 10E9/L (ref 0–1.3)
MONOCYTES NFR BLD AUTO: 0.6 %
NEUTROPHILS # BLD AUTO: 6.6 10E9/L (ref 1.6–8.3)
NEUTROPHILS NFR BLD AUTO: 93.4 %
NRBC # BLD AUTO: 0 10*3/UL
NRBC BLD AUTO-RTO: 0 /100
PLATELET # BLD AUTO: 146 10E9/L (ref 150–450)
RBC # BLD AUTO: 3.63 10E12/L (ref 3.8–5.2)
WBC # BLD AUTO: 7.1 10E9/L (ref 4–11)

## 2018-11-07 PROCEDURE — 85025 COMPLETE CBC W/AUTO DIFF WBC: CPT | Performed by: INTERNAL MEDICINE

## 2018-11-07 PROCEDURE — 25000128 H RX IP 250 OP 636: Performed by: INTERNAL MEDICINE

## 2018-11-07 PROCEDURE — 85610 PROTHROMBIN TIME: CPT | Performed by: INTERNAL MEDICINE

## 2018-11-07 PROCEDURE — 99207 ZZC NO CHARGE NURSE ONLY: CPT | Performed by: INTERNAL MEDICINE

## 2018-11-07 PROCEDURE — 96401 CHEMO ANTI-NEOPL SQ/IM: CPT

## 2018-11-07 RX ORDER — HEPARIN SODIUM (PORCINE) LOCK FLUSH IV SOLN 100 UNIT/ML 100 UNIT/ML
5 SOLUTION INTRAVENOUS EVERY 8 HOURS
Status: DISCONTINUED | OUTPATIENT
Start: 2018-11-07 | End: 2018-11-07 | Stop reason: HOSPADM

## 2018-11-07 RX ADMIN — SODIUM CHLORIDE, PRESERVATIVE FREE 5 ML: 5 INJECTION INTRAVENOUS at 11:09

## 2018-11-07 RX ADMIN — BORTEZOMIB 2.3 MG: 3.5 INJECTION, POWDER, LYOPHILIZED, FOR SOLUTION INTRAVENOUS; SUBCUTANEOUS at 11:24

## 2018-11-07 ASSESSMENT — PAIN SCALES - GENERAL: PAINLEVEL: NO PAIN (0)

## 2018-11-07 NOTE — MR AVS SNAPSHOT
Amira IVY Arreola   11/7/2018   Anticoagulation Therapy Visit    Description:  86 year old female   Provider:  Addy Frias MD   Department:  [facility]           INR as of 11/7/2018     Today's INR 2.18      Anticoagulation Summary as of 11/7/2018     INR goal 2.0-3.0   Today's INR 2.18   Full warfarin instructions 2 mg on Fri; 4 mg all other days   Next INR check 11/21/2018    Indications   Long-term (current) use of anticoagulants [Z79.01] [Z79.01]  Atrial fibrillation (H) [I48.91] (Resolved) [I48.91]         November 2018 Details    Sun Mon Tue Wed Thu Fri Sat         1               2               3                 4               5               6               7      4 mg   See details      8      4 mg         9      2 mg         10      4 mg           11      4 mg         12      4 mg         13      4 mg         14      4 mg         15      4 mg         16      2 mg         17      4 mg           18      4 mg         19      4 mg         20      4 mg         21            22               23               24                 25               26               27               28               29               30                 Date Details   11/07 This INR check       Date of next INR:  11/21/2018         How to take your warfarin dose     To take:  2 mg Take 0.5 of a 4 mg tablet.    To take:  4 mg Take 1 of the 4 mg tablets.

## 2018-11-07 NOTE — MR AVS SNAPSHOT
After Visit Summary   11/7/2018    Amira Arreola    MRN: [id number]           Patient Information     Date Of Birth          7/17/1932        Visit Information        Provider Department      11/7/2018 9:30 AM  INFUSION CHAIR 12 [facility] and [facility]fusion [facility]        Today's Diagnoses     Multiple myeloma not having achieved remission (H)    -  1    Paroxysmal atrial fibrillation (H)           Follow-ups after your visit        Your next 10 appointments already scheduled     Nov 21, 2018  9:30 AM CST   Level 2 with  INFUSION CHAIR 12   [facility] and [facility]fusion [facility] ([facility])    [facility]  [address]  [place] [address]   [phone]            Nov 21, 2018 10:00 AM CST   Return Visit with Shayne Roberts MD   [facility] ([facility])    [facility]  [address]  [place] [address] [phone]              Who to contact     If you have questions or need follow up information about today's clinic visit or your schedule please contact [facility] directly at [phone].  Normal or non-critical lab and imaging results will be communicated to you by MyChart, letter or phone within 4 business days after the clinic has received the results. If you do not hear from us within 7 days, please contact the clinic through MyChart or phone. If you have a critical or abnormal lab result, we will notify you by phone as soon as possible.  Submit refill requests through [employer] or call your pharmacy and they will forward the refill request to us. Please allow 3 business days for your refill to be completed.          Additional Information About Your Visit        Care EveryWhere ID     This is your Care EveryWhere ID. This could be used by other organizations to access your [facility] medical records  [id number]        Your Vitals  "Were     Pulse Temperature Respirations Height Pulse Oximetry BMI (Body Mass Index)    67 97.8  F (36.6  C) (Oral) 16 1.626 m (5' 4\") 97% 22.9 kg/m2       Blood Pressure from Last 3 Encounters:   11/07/18 114/75   11/02/18 111/67   10/24/18 150/87    Weight from Last 3 Encounters:   11/07/18 60.5 kg (133 lb 6.4 oz)   11/02/18 60.3 kg (133 lb)   10/24/18 61.6 kg (135 lb 12.8 oz)              We Performed the Following     CBC with platelets differential     INR        Primary Care Provider Office Phone # Fax #    Addy Frias [phone] [address]  [place] [address]        Equal Access to Services     [facility] : Hadii liane murcia hadasho Soyair, waaxda luqadaha, qaybta kaalmada adeashu, hung dominique . So [facility] [phone].    ATENCIÓN: Si habla español, tiene a barlow disposición servicios gratuitos de asistencia lingüística. [facility] [phone].    We comply with applicable federal civil rights laws and [place] laws. We do not discriminate on the basis of race, color, national origin, age, disability, sex, sexual orientation, or gender identity.            Thank you!     Thank you for choosing [facility]  for your care. Our goal is always to provide you with excellent care. Hearing back from our patients is one way we can continue to improve our services. Please take a few minutes to complete the written survey that you may receive in the mail after your visit with us. Thank you!             Your Updated Medication List - Protect others around you: Learn how to safely use, store and throw away your medicines at www.disposemymeds.org.          This list is accurate as of 11/7/18 11:12 AM.  Always use your most recent med list.                   Brand Name Dispense Instructions for use Diagnosis    ACYCLOVIR PO      Take 400 mg by mouth 2 times daily        BENADRYL PO      Take 25 mg by mouth nightly as needed "        CALCIUM CITRATE + PO      Take 2,000 mg by mouth daily 2 tabs        carboxymethylcellulose 0.5 % Soln ophthalmic solution    REFRESH PLUS     1 drop 4 times daily        CLARINEX PO      Take by mouth daily Taking claritin        COMPAZINE PO      Take 10 mg by mouth daily as needed        cycloSPORINE 0.05 % ophthalmic emulsion    RESTASIS     Place 1 drop into both eyes every 12 hours        DAILY MULTIVITAMIN PO      Take 1 tablet by mouth daily.    Routine general medical examination at a health care facility       * dexamethasone 4 MG tablet    DECADRON    28 tablet    Take 20mg (5 tablets) by mouth every week.    Multiple myeloma not having achieved remission (H)       * dexamethasone 4 MG tablet    DECADRON    28 tablet    Take 20mg (5 tablets) by mouth every week.    Multiple myeloma not having achieved remission (H)       lidocaine-prilocaine cream    EMLA    30 g    Apply to port site 1 hour prior to access    Multiple myeloma not having achieved remission (H)       LORazepam 0.5 MG tablet    ATIVAN    30 tablet    Take 1 tablet (0.5 mg) by mouth every 8 hours as needed for anxiety    Multiple myeloma not having achieved remission (H)       metoprolol succinate 50 MG 24 hr tablet    TOPROL-XL    270 tablet    TAKE 2 TABLETS BY MOUTH EVERY MORNING, AND 1 TABLET EVERY EVENING    Paroxysmal atrial fibrillation (H)       oxyCODONE IR 15 MG tablet    ROXICODONE    90 tablet    Take 1 tablet (15 mg) by mouth every 8 hours as needed for pain maximum 4 tablet(s) per day    Multiple myeloma not having achieved remission (H)       polyethylene glycol powder    MIRALAX/GLYCOLAX     Take 1 capful by mouth daily as needed    Bilateral leg edema       REFRESH OP           SIMBRINZA 1-0.2 % ophthalmic suspension   Generic drug:  brinzolamide-brimonidine      Place 1 drop into the right eye 2 times daily 1 drop AM and PM        triamcinolone 0.5 % cream    KENALOG    15 g    Apply sparingly to affected area three  times daily. 1-2 weeks , as needed    Rash and nonspecific skin eruption       TYLENOL PO      Take 500 mg by mouth every 6 hours as needed for mild pain or fever        UNABLE TO FIND      MEDICATION NAME: Fresh Coat eye drops        VITAMIN D3 PO      Take 1,000 Units by mouth daily        warfarin 4 MG tablet    COUMADIN    110 tablet    TAKE 1-2 TABLETS BY MOUTH EVERY DAY AS DIRECTED BY INR CLINIC    Paroxysmal atrial fibrillation (H), Long-term (current) use of anticoagulants       ZOMETA IV      Inject into the vein every 30 days Every 3 month dosing        * Notice:  This list has 2 medication(s) that are the same as other medications prescribed for you. Read the directions carefully, and ask your doctor or other care provider to review them with you.

## 2018-11-07 NOTE — PROGRESS NOTES
ANTICOAGULATION FOLLOW-UP CLINIC VISIT    Patient Name:  Amira Arreola  Date:  11/7/2018  Contact Type:  Telephone/ Home phone of pt      SUBJECTIVE:     Patient Findings     Positives No Problem Findings           OBJECTIVE    INR   Date Value Ref Range Status   11/07/2018 2.18 (H) 0.86 - 1.14 Final       ASSESSMENT / PLAN  INR assessment THER    Recheck INR In: 2 WEEKS    INR Location Outside lab      Anticoagulation Summary as of 11/7/2018     INR goal 2.0-3.0   Today's INR 2.18   Warfarin maintenance plan 2 mg (4 mg x 0.5) on Fri; 4 mg (4 mg x 1) all other days   Full warfarin instructions 2 mg on Fri; 4 mg all other days   Weekly warfarin total 26 mg   No change documented Jessica Moody RN   Plan last modified Tigist Corral RN (10/25/2018)   Next INR check 11/21/2018   Target end date Indefinite    Indications   Long-term (current) use of anticoagulants [Z79.01] [Z79.01]  Atrial fibrillation (H) [I48.91] (Resolved) [I48.91]         Anticoagulation Episode Summary     INR check location     Preferred lab     Send INR reminders to [facility] ANTICOAGULATION    Comments  INR to be drawn at infusion visit OR home care nurse. Watch result notes. Patient can be reached at home phone [phone]. Do not leave dosing with spouse.       Anticoagulation Care Providers     Provider Role Specialty Phone number    Addy Frias MD [facility] Internal Medicine [phone]            See the Encounter Report to view Anticoagulation Flowsheet and Dosing Calendar (Go to Encounters tab in chart review, and find the Anticoagulation Therapy Visit)    Dosage adjustment made based on physician directed care plan.  Recheck in 2 weeks  Jessica Moody RN    Per result note, patient had not been given dosing instructions. Reached patient. Continue same dosing and recheck INR 2 weeks.  Tigist Corral RN

## 2018-11-07 NOTE — PROGRESS NOTES
Infusion Nursing Note:  Amira Arreola presents today for Velcade.    Patient seen by provider today: No   present during visit today: Not Applicable.    Note: pain controlled with meds, pt states she does not need any refills.    Intravenous Access:  Implanted Port.    Treatment Conditions:  Lab Results   Component Value Date    HGB 11.9 11/07/2018     Lab Results   Component Value Date    WBC 7.1 11/07/2018      Lab Results   Component Value Date    ANEU 6.6 11/07/2018     Lab Results   Component Value Date     11/07/2018      Results reviewed, labs MET treatment parameters, ok to proceed with treatment.      Post Infusion Assessment:  Patient tolerated injection without incident.  Access discontinued per protocol.    Discharge Plan:   Discharge instructions reviewed with: Patient.  Patient and/or family verbalized understanding of discharge instructions and all questions answered.  Copy of AVS reviewed with patient and/or family.  Patient will return 11/21/18 for next appointment.  Patient discharged in stable condition accompanied by: self.  Departure Mode: Ambulatory.    Yesi Chatterjee RN

## 2018-11-21 ENCOUNTER — INFUSION THERAPY VISIT (OUTPATIENT)
Dept: INFUSION THERAPY | Facility: CLINIC | Age: 83
End: 2018-11-21
Attending: INTERNAL MEDICINE
Payer: MEDICARE

## 2018-11-21 ENCOUNTER — HOSPITAL ENCOUNTER (OUTPATIENT)
Facility: CLINIC | Age: 83
Setting detail: SPECIMEN
Discharge: HOME | End: 2018-11-21
Attending: INTERNAL MEDICINE | Admitting: INTERNAL MEDICINE
Payer: MEDICARE

## 2018-11-21 ENCOUNTER — ONCOLOGY VISIT (OUTPATIENT)
Dept: ONCOLOGY | Facility: CLINIC | Age: 83
End: 2018-11-21
Attending: INTERNAL MEDICINE
Payer: MEDICARE

## 2018-11-21 ENCOUNTER — ANTICOAGULATION THERAPY VISIT (OUTPATIENT)
Dept: FAMILY MEDICINE | Facility: CLINIC | Age: 83
End: 2018-11-21
Payer: COMMERCIAL

## 2018-11-21 ENCOUNTER — TELEPHONE (OUTPATIENT)
Dept: FAMILY MEDICINE | Facility: CLINIC | Age: 83
End: 2018-11-21

## 2018-11-21 VITALS
DIASTOLIC BLOOD PRESSURE: 94 MMHG | HEART RATE: 79 BPM | RESPIRATION RATE: 16 BRPM | WEIGHT: 134 LBS | SYSTOLIC BLOOD PRESSURE: 143 MMHG | HEIGHT: 64 IN | BODY MASS INDEX: 22.88 KG/M2

## 2018-11-21 VITALS
OXYGEN SATURATION: 98 % | HEIGHT: 64 IN | DIASTOLIC BLOOD PRESSURE: 87 MMHG | RESPIRATION RATE: 16 BRPM | HEART RATE: 71 BPM | WEIGHT: 134 LBS | SYSTOLIC BLOOD PRESSURE: 157 MMHG | BODY MASS INDEX: 22.88 KG/M2 | TEMPERATURE: 98.4 F

## 2018-11-21 DIAGNOSIS — I48.0 PAROXYSMAL ATRIAL FIBRILLATION (H): ICD-10-CM

## 2018-11-21 DIAGNOSIS — C90.00 MULTIPLE MYELOMA NOT HAVING ACHIEVED REMISSION (H): Primary | ICD-10-CM

## 2018-11-21 LAB
ALBUMIN SERPL-MCNC: 3.2 G/DL (ref 3.4–5)
ALP SERPL-CCNC: 45 U/L (ref 40–150)
ALT SERPL W P-5'-P-CCNC: 23 U/L (ref 0–50)
APTT PPP: 39 SEC (ref 22–37)
AST SERPL W P-5'-P-CCNC: 19 U/L (ref 0–45)
BASOPHILS # BLD AUTO: 0 10E9/L (ref 0–0.2)
BASOPHILS NFR BLD AUTO: 0 %
BILIRUB DIRECT SERPL-MCNC: 0.2 MG/DL (ref 0–0.2)
BILIRUB SERPL-MCNC: 0.7 MG/DL (ref 0.2–1.3)
DIFFERENTIAL METHOD BLD: ABNORMAL
EOSINOPHIL # BLD AUTO: 0 10E9/L (ref 0–0.7)
EOSINOPHIL NFR BLD AUTO: 0.3 %
ERYTHROCYTE [DISTWIDTH] IN BLOOD BY AUTOMATED COUNT: 15.1 % (ref 10–15)
HCT VFR BLD AUTO: 34.5 % (ref 35–47)
HGB BLD-MCNC: 11.3 G/DL (ref 11.7–15.7)
IMM GRANULOCYTES # BLD: 0 10E9/L (ref 0–0.4)
IMM GRANULOCYTES NFR BLD: 0.3 %
INR PPP: 3.21 (ref 0.86–1.14)
LYMPHOCYTES # BLD AUTO: 0.7 10E9/L (ref 0.8–5.3)
LYMPHOCYTES NFR BLD AUTO: 10.2 %
MCH RBC QN AUTO: 32.4 PG (ref 26.5–33)
MCHC RBC AUTO-ENTMCNC: 32.8 G/DL (ref 31.5–36.5)
MCV RBC AUTO: 99 FL (ref 78–100)
MONOCYTES # BLD AUTO: 0.6 10E9/L (ref 0–1.3)
MONOCYTES NFR BLD AUTO: 8.9 %
NEUTROPHILS # BLD AUTO: 5.8 10E9/L (ref 1.6–8.3)
NEUTROPHILS NFR BLD AUTO: 80.3 %
PLATELET # BLD AUTO: 166 10E9/L (ref 150–450)
PROT SERPL-MCNC: 5.6 G/DL (ref 6.8–8.8)
RBC # BLD AUTO: 3.49 10E12/L (ref 3.8–5.2)
WBC # BLD AUTO: 7.2 10E9/L (ref 4–11)

## 2018-11-21 PROCEDURE — 85610 PROTHROMBIN TIME: CPT | Performed by: INTERNAL MEDICINE

## 2018-11-21 PROCEDURE — 85025 COMPLETE CBC W/AUTO DIFF WBC: CPT | Performed by: INTERNAL MEDICINE

## 2018-11-21 PROCEDURE — G0463 HOSPITAL OUTPT CLINIC VISIT: HCPCS | Mod: 25

## 2018-11-21 PROCEDURE — 83883 ASSAY NEPHELOMETRY NOT SPEC: CPT | Performed by: INTERNAL MEDICINE

## 2018-11-21 PROCEDURE — 25000132 ZZH RX MED GY IP 250 OP 250 PS 637: Performed by: INTERNAL MEDICINE

## 2018-11-21 PROCEDURE — A9270 NON-COVERED ITEM OR SERVICE: HCPCS | Performed by: INTERNAL MEDICINE

## 2018-11-21 PROCEDURE — 96413 CHEMO IV INFUSION 1 HR: CPT

## 2018-11-21 PROCEDURE — 84165 PROTEIN E-PHORESIS SERUM: CPT | Performed by: INTERNAL MEDICINE

## 2018-11-21 PROCEDURE — 96401 CHEMO ANTI-NEOPL SQ/IM: CPT

## 2018-11-21 PROCEDURE — 96415 CHEMO IV INFUSION ADDL HR: CPT

## 2018-11-21 PROCEDURE — 96367 TX/PROPH/DG ADDL SEQ IV INF: CPT

## 2018-11-21 PROCEDURE — 99207 ZZC NO CHARGE NURSE ONLY: CPT | Performed by: INTERNAL MEDICINE

## 2018-11-21 PROCEDURE — 00000402 ZZHCL STATISTIC TOTAL PROTEIN: Performed by: INTERNAL MEDICINE

## 2018-11-21 PROCEDURE — 80076 HEPATIC FUNCTION PANEL: CPT | Performed by: INTERNAL MEDICINE

## 2018-11-21 PROCEDURE — 25000128 H RX IP 250 OP 636: Performed by: INTERNAL MEDICINE

## 2018-11-21 PROCEDURE — 85730 THROMBOPLASTIN TIME PARTIAL: CPT | Performed by: INTERNAL MEDICINE

## 2018-11-21 PROCEDURE — 99214 OFFICE O/P EST MOD 30 MIN: CPT | Performed by: INTERNAL MEDICINE

## 2018-11-21 RX ORDER — EPINEPHRINE 1 MG/ML
0.3 INJECTION, SOLUTION INTRAMUSCULAR; SUBCUTANEOUS EVERY 5 MIN PRN
Status: CANCELLED | OUTPATIENT
Start: 2018-11-21

## 2018-11-21 RX ORDER — ALBUTEROL SULFATE 90 UG/1
1-2 AEROSOL, METERED RESPIRATORY (INHALATION)
Status: CANCELLED
Start: 2018-12-05

## 2018-11-21 RX ORDER — EPINEPHRINE 0.3 MG/.3ML
0.3 INJECTION SUBCUTANEOUS EVERY 5 MIN PRN
Status: CANCELLED | OUTPATIENT
Start: 2018-12-05

## 2018-11-21 RX ORDER — MEPERIDINE HYDROCHLORIDE 25 MG/ML
25 INJECTION INTRAMUSCULAR; INTRAVENOUS; SUBCUTANEOUS EVERY 30 MIN PRN
Status: CANCELLED | OUTPATIENT
Start: 2018-11-21

## 2018-11-21 RX ORDER — SODIUM CHLORIDE 9 MG/ML
1000 INJECTION, SOLUTION INTRAVENOUS CONTINUOUS PRN
Status: CANCELLED
Start: 2018-12-05

## 2018-11-21 RX ORDER — EPINEPHRINE 0.3 MG/.3ML
0.3 INJECTION SUBCUTANEOUS EVERY 5 MIN PRN
Status: CANCELLED | OUTPATIENT
Start: 2018-11-21

## 2018-11-21 RX ORDER — LORAZEPAM 2 MG/ML
0.5 INJECTION INTRAMUSCULAR EVERY 4 HOURS PRN
Status: CANCELLED
Start: 2018-11-21

## 2018-11-21 RX ORDER — DIPHENHYDRAMINE HCL 25 MG
50 CAPSULE ORAL ONCE
Status: CANCELLED | OUTPATIENT
Start: 2018-11-21

## 2018-11-21 RX ORDER — ACETAMINOPHEN 325 MG/1
650 TABLET ORAL ONCE
Status: CANCELLED | OUTPATIENT
Start: 2018-11-21

## 2018-11-21 RX ORDER — ZOLEDRONIC ACID 0.04 MG/ML
4 INJECTION, SOLUTION INTRAVENOUS ONCE
Status: CANCELLED | OUTPATIENT
Start: 2019-01-16 | End: 2019-01-16

## 2018-11-21 RX ORDER — HEPARIN SODIUM (PORCINE) LOCK FLUSH IV SOLN 100 UNIT/ML 100 UNIT/ML
5 SOLUTION INTRAVENOUS EVERY 8 HOURS
Status: DISCONTINUED | OUTPATIENT
Start: 2018-11-21 | End: 2018-11-21 | Stop reason: HOSPADM

## 2018-11-21 RX ORDER — ACETAMINOPHEN 325 MG/1
650 TABLET ORAL ONCE
Status: COMPLETED | OUTPATIENT
Start: 2018-11-21 | End: 2018-11-21

## 2018-11-21 RX ORDER — ALBUTEROL SULFATE 90 UG/1
1-2 AEROSOL, METERED RESPIRATORY (INHALATION)
Status: CANCELLED
Start: 2018-11-21

## 2018-11-21 RX ORDER — ALBUTEROL SULFATE 0.83 MG/ML
2.5 SOLUTION RESPIRATORY (INHALATION)
Status: CANCELLED | OUTPATIENT
Start: 2018-11-21

## 2018-11-21 RX ORDER — ALBUTEROL SULFATE 0.83 MG/ML
2.5 SOLUTION RESPIRATORY (INHALATION)
Status: CANCELLED | OUTPATIENT
Start: 2018-12-05

## 2018-11-21 RX ORDER — LORAZEPAM 2 MG/ML
0.5 INJECTION INTRAMUSCULAR EVERY 4 HOURS PRN
Status: CANCELLED
Start: 2018-12-05

## 2018-11-21 RX ORDER — HEPARIN SODIUM (PORCINE) LOCK FLUSH IV SOLN 100 UNIT/ML 100 UNIT/ML
5 SOLUTION INTRAVENOUS EVERY 8 HOURS
Status: CANCELLED
Start: 2018-12-05

## 2018-11-21 RX ORDER — HEPARIN SODIUM (PORCINE) LOCK FLUSH IV SOLN 100 UNIT/ML 100 UNIT/ML
5 SOLUTION INTRAVENOUS EVERY 8 HOURS
Status: CANCELLED
Start: 2018-11-21

## 2018-11-21 RX ORDER — DIPHENHYDRAMINE HYDROCHLORIDE 50 MG/ML
50 INJECTION INTRAMUSCULAR; INTRAVENOUS
Status: CANCELLED
Start: 2018-11-21

## 2018-11-21 RX ORDER — METHYLPREDNISOLONE SODIUM SUCCINATE 125 MG/2ML
125 INJECTION, POWDER, LYOPHILIZED, FOR SOLUTION INTRAMUSCULAR; INTRAVENOUS
Status: CANCELLED
Start: 2018-11-21

## 2018-11-21 RX ORDER — METHYLPREDNISOLONE SODIUM SUCCINATE 125 MG/2ML
125 INJECTION, POWDER, LYOPHILIZED, FOR SOLUTION INTRAMUSCULAR; INTRAVENOUS
Status: CANCELLED
Start: 2018-12-05

## 2018-11-21 RX ORDER — DEXAMETHASONE 4 MG/1
TABLET ORAL
Qty: 28 TABLET | Refills: 0 | Status: SHIPPED | OUTPATIENT
Start: 2018-11-21 | End: 2019-01-16

## 2018-11-21 RX ORDER — OXYCODONE HYDROCHLORIDE 15 MG/1
15 TABLET ORAL EVERY 8 HOURS PRN
Qty: 90 TABLET | Refills: 0 | Status: SHIPPED | OUTPATIENT
Start: 2018-11-21 | End: 2018-11-23

## 2018-11-21 RX ORDER — EPINEPHRINE 1 MG/ML
0.3 INJECTION, SOLUTION INTRAMUSCULAR; SUBCUTANEOUS EVERY 5 MIN PRN
Status: CANCELLED | OUTPATIENT
Start: 2018-12-05

## 2018-11-21 RX ORDER — DIPHENHYDRAMINE HYDROCHLORIDE 50 MG/ML
50 INJECTION INTRAMUSCULAR; INTRAVENOUS
Status: CANCELLED
Start: 2018-12-05

## 2018-11-21 RX ORDER — MEPERIDINE HYDROCHLORIDE 25 MG/ML
25 INJECTION INTRAMUSCULAR; INTRAVENOUS; SUBCUTANEOUS EVERY 30 MIN PRN
Status: CANCELLED | OUTPATIENT
Start: 2018-12-05

## 2018-11-21 RX ORDER — SODIUM CHLORIDE 9 MG/ML
1000 INJECTION, SOLUTION INTRAVENOUS CONTINUOUS PRN
Status: CANCELLED
Start: 2018-11-21

## 2018-11-21 RX ADMIN — SODIUM CHLORIDE, PRESERVATIVE FREE 5 ML: 5 INJECTION INTRAVENOUS at 13:40

## 2018-11-21 RX ADMIN — SODIUM CHLORIDE 250 ML: 9 INJECTION, SOLUTION INTRAVENOUS at 11:12

## 2018-11-21 RX ADMIN — ACETAMINOPHEN 650 MG: 325 TABLET ORAL at 12:02

## 2018-11-21 RX ADMIN — BORTEZOMIB 2.3 MG: 3.5 INJECTION, POWDER, LYOPHILIZED, FOR SOLUTION INTRAVENOUS; SUBCUTANEOUS at 13:01

## 2018-11-21 RX ADMIN — DEXAMETHASONE SODIUM PHOSPHATE 12 MG: 10 INJECTION, SOLUTION INTRAMUSCULAR; INTRAVENOUS at 11:12

## 2018-11-21 RX ADMIN — DIPHENHYDRAMINE HYDROCHLORIDE 50 MG: 50 INJECTION, SOLUTION INTRAMUSCULAR; INTRAVENOUS at 11:33

## 2018-11-21 RX ADMIN — DARATUMUMAB 1000 MG: 100 INJECTION, SOLUTION, CONCENTRATE INTRAVENOUS at 12:04

## 2018-11-21 ASSESSMENT — PAIN SCALES - GENERAL
PAINLEVEL: NO PAIN (0)
PAINLEVEL: NO PAIN (0)

## 2018-11-21 NOTE — PROGRESS NOTES
"Oncology Rooming Note    November 21, 2018 10:24 AM   Amira Arreola is a 86 year old female who presents for:    Chief Complaint   Patient presents with     Oncology Clinic Visit     Multiple myeloma not having achieved remission      Initial Vitals: /87 (BP Location: Left arm, Patient Position: Sitting, Cuff Size: Adult Regular)  Pulse 71  Temp 98.4  F (36.9  C) (Oral)  Resp 16  Ht 1.626 m (5' 4.02\")  Wt 60.8 kg (134 lb)  SpO2 98%  BMI 22.99 kg/m2 Estimated body mass index is 22.99 kg/(m^2) as calculated from the following:    Height as of this encounter: 1.626 m (5' 4.02\").    Weight as of this encounter: 60.8 kg (134 lb). Body surface area is 1.66 meters squared.  No Pain (0) Comment: Data Unavailable   No LMP recorded. Patient is postmenopausal.  Allergies reviewed: Yes  Medications reviewed: Yes    Medications: MEDICATION REFILLS NEEDED TODAY. Provider was notified. MEDICATION REFILL NEEDED ON OXYCODONE  Pharmacy name entered into [employer]: [facility] [id number] [place] [address] 7 [place] [facility] [place] [address]    Clinical concerns: None                   5 minutes for nursing intake (face to face time)     Zora Bentley MA            "

## 2018-11-21 NOTE — PROGRESS NOTES
ANTICOAGULATION FOLLOW-UP CLINIC VISIT    Patient Name:  Amira Arreola  Date:  11/21/2018  Contact Type:  Telephone    SUBJECTIVE:     Patient Findings     Positives No Problem Findings    Comments Recent cancer infusion, as number not very out of range and would be expected a little elevated with infusion, gave ok to recheck in 2 weeks, and to call if any sx of concern.           OBJECTIVE    INR   Date Value Ref Range Status   11/21/2018 3.21 (H) 0.86 - 1.14 Final       ASSESSMENT / PLAN  INR assessment THER    Recheck INR In: 2 WEEKS    INR Location Clinic      Anticoagulation Summary as of 11/21/2018     INR goal 2.0-3.0   Today's INR 3.21!   Warfarin maintenance plan 2 mg (4 mg x 0.5) on Fri; 4 mg (4 mg x 1) all other days   Full warfarin instructions 2 mg on Fri; 4 mg all other days   Weekly warfarin total 26 mg   No change documented Aruna Fajardo, RN   Plan last modified Tigist Corral RN (10/25/2018)   Next INR check 12/5/2018   Target end date Indefinite    Indications   Long-term (current) use of anticoagulants [Z79.01] [Z79.01]  Atrial fibrillation (H) [I48.91] (Resolved) [I48.91]         Anticoagulation Episode Summary     INR check location     Preferred lab     Send INR reminders to [facility] ANTICOAGULATION    Comments  INR to be drawn at infusion visit OR home care nurse. Watch result notes. Patient can be reached at home phone [phone]. Do not leave dosing with spouse.   Pt advised to call for triage if not hearing back from us for dosing.      Anticoagulation Care Providers     Provider Role Specialty Phone number    Addy Frias MD [facility] Internal Medicine [phone]            See the Encounter Report to view Anticoagulation Flowsheet and Dosing Calendar (Go to Encounters tab in chart review, and find the Anticoagulation Therapy Visit)    Dosage adjustment made based on physician directed care plan.    Pt aware if signs of clotting (pain, tenderness, swelling, color change in  leg or arm, SOB) and bleeding occur (blood in stool, urine, large bruising, bleeding gums, nosebleeds) to have INR check sooner. If sx severe report to ER or concerned for stroke call 911. If general questions or concerns arise, call clinic.         Jessica Moody RN

## 2018-11-21 NOTE — MR AVS SNAPSHOT
After Visit Summary   11/21/2018    Amira Arreola    MRN: [id number]           Patient Information     Date Of Birth          7/17/1932        Visit Information        Provider Department      11/21/2018 9:30 AM  INFUSION CHAIR 12 [facility]        Today's Diagnoses     Multiple myeloma not having achieved remission (H)    -  1    Paroxysmal atrial fibrillation (H)           Follow-ups after your visit        Follow-up notes from your care team     Return in 2 weeks (on 12/5/2018).      Your next 10 appointments already scheduled     Dec 05, 2018  9:30 AM CST   Level 2 with  INFUSION CHAIR 6   [facility] ([facility])    [facility]  [address]  [place] [address]   [phone]            Dec 19, 2018  9:30 AM CST   Level 2 with  INFUSION CHAIR 7   [facility] ([facility])    [facility]  [address]  [place] [address]   [phone]            Dec 19, 2018 10:20 AM CST   Return Visit with Shayne Roberts MD   [facility] ([facility])    [facility]  [address]  [place] [address]   [phone]              Who to contact     If you have questions or need follow up information about today's clinic visit or your schedule please contact [facility] directly at [phone].  Normal or non-critical lab and imaging results will be communicated to you by MyChart, letter or phone within 4 business days after the clinic has received the results. If you do not hear from us within 7 days, please contact the clinic through [employer] or phone. If you have a critical or abnormal lab result, we will notify you by phone as soon as possible.  Submit refill requests through [employer] or call your pharmacy and they will  "forward the refill request to us. Please allow 3 business days for your refill to be completed.          Additional Information About Your Visit        Care EveryWhere ID     This is your Care EveryWhere ID. This could be used by other organizations to access your [facility] medical records  [id number]        Your Vitals Were     Pulse Respirations Height BMI (Body Mass Index)          79 16 1.626 m (5' 4.02\") 22.99 kg/m2         Blood Pressure from Last 3 Encounters:   11/21/18 157/87   11/21/18 (!) 143/94   11/07/18 114/75    Weight from Last 3 Encounters:   11/21/18 60.8 kg (134 lb)   11/21/18 60.8 kg (134 lb)   11/07/18 60.5 kg (133 lb 6.4 oz)              We Performed the Following     CBC with platelets differential     Hepatic panel     INR     Kappa and lambda light chain     Partial thromboplastin time     Protein electrophoresis          Today's Medication Changes          These changes are accurate as of 11/21/18  2:08 PM.  If you have any questions, ask your nurse or doctor.               These medicines have changed or have updated prescriptions.        Dose/Directions    * dexamethasone 4 MG tablet   Commonly known as:  DECADRON   This may have changed:  Another medication with the same name was added. Make sure you understand how and when to take each.   Used for:  Multiple myeloma not having achieved remission (H)        Take 20mg (5 tablets) by mouth every week.   Quantity:  28 tablet   Refills:  0       * dexamethasone 4 MG tablet   Commonly known as:  DECADRON   This may have changed:  Another medication with the same name was added. Make sure you understand how and when to take each.   Used for:  Multiple myeloma not having achieved remission (H)        Take 20mg (5 tablets) by mouth every week.   Quantity:  28 tablet   Refills:  0       * dexamethasone 4 MG tablet   Commonly known as:  DECADRON   This may have changed:  You were already taking a medication with the same name, and this " prescription was added. Make sure you understand how and when to take each.   Used for:  Multiple myeloma not having achieved remission (H)        Take 20mg (5 tablets) by mouth every week.   Quantity:  28 tablet   Refills:  0       * Notice:  This list has 3 medication(s) that are the same as other medications prescribed for you. Read the directions carefully, and ask your doctor or other care provider to review them with you.         Where to get your medicines      These medications were sent to [id number] - [place], [place] - [address] AT [facility] [address]  [address], [place] [address]     Phone:  [phone]     dexamethasone 4 MG tablet         Some of these will need a paper prescription and others can be bought over the counter.  Ask your nurse if you have questions.     Bring a paper prescription for each of these medications     oxyCODONE IR 15 MG tablet                Primary Care Provider Office Phone # Fax #    Addy Sean Frias [phone] [address] [facility] [address]  [facility] [address]        Equal Access to Services     [facility] AH: Hadii aad ku hadasho Soomaali, waaxda luqadaha, qaybta kaalmada adeashu, hung dominique . So [facility] [phone].    ATENCIÓN: Si habla español, tiene a barlow disposición servicios gratuitos de asistencia lingüística. Kadie al [phone].    We comply with applicable federal civil rights laws and [place] laws. We do not discriminate on the basis of race, color, national origin, age, disability, sex, sexual orientation, or gender identity.            Thank you!     Thank you for choosing [facility]  for your care. Our goal is always to provide you with excellent care. Hearing back from our patients is one way we can continue to improve our services. Please take a few minutes to complete the written survey that you may receive in the mail after your visit with  us. Thank you!             Your Updated Medication List - Protect others around you: Learn how to safely use, store and throw away your medicines at www.disposemymeds.org.          This list is accurate as of 11/21/18  2:08 PM.  Always use your most recent med list.                   Brand Name Dispense Instructions for use Diagnosis    ACYCLOVIR PO      Take 400 mg by mouth 2 times daily        BENADRYL PO      Take 25 mg by mouth nightly as needed        CALCIUM CITRATE + PO      Take 2,000 mg by mouth daily 2 tabs        carboxymethylcellulose 0.5 % Soln ophthalmic solution    REFRESH PLUS     1 drop 4 times daily        CLARINEX PO      Take by mouth daily Taking claritin        COMPAZINE PO      Take 10 mg by mouth daily as needed        cycloSPORINE 0.05 % ophthalmic emulsion    RESTASIS     Place 1 drop into both eyes every 12 hours        DAILY MULTIVITAMIN PO      Take 1 tablet by mouth daily.    Routine general medical examination at a health care facility       * dexamethasone 4 MG tablet    DECADRON    28 tablet    Take 20mg (5 tablets) by mouth every week.    Multiple myeloma not having achieved remission (H)       * dexamethasone 4 MG tablet    DECADRON    28 tablet    Take 20mg (5 tablets) by mouth every week.    Multiple myeloma not having achieved remission (H)       * dexamethasone 4 MG tablet    DECADRON    28 tablet    Take 20mg (5 tablets) by mouth every week.    Multiple myeloma not having achieved remission (H)       lidocaine-prilocaine cream    EMLA    30 g    Apply to port site 1 hour prior to access    Multiple myeloma not having achieved remission (H)       LORazepam 0.5 MG tablet    ATIVAN    30 tablet    Take 1 tablet (0.5 mg) by mouth every 8 hours as needed for anxiety    Multiple myeloma not having achieved remission (H)       metoprolol succinate 50 MG 24 hr tablet    TOPROL-XL    270 tablet    TAKE 2 TABLETS BY MOUTH EVERY MORNING, AND 1 TABLET EVERY EVENING    Paroxysmal atrial  fibrillation (H)       oxyCODONE IR 15 MG tablet    ROXICODONE    90 tablet    Take 1 tablet (15 mg) by mouth every 8 hours as needed for pain maximum 4 tablet(s) per day    Multiple myeloma not having achieved remission (H)       polyethylene glycol powder    MIRALAX/GLYCOLAX     Take 1 capful by mouth daily as needed    Bilateral leg edema       REFRESH OP           SIMBRINZA 1-0.2 % ophthalmic suspension   Generic drug:  brinzolamide-brimonidine      Place 1 drop into the right eye 2 times daily 1 drop AM and PM        triamcinolone 0.5 % cream    KENALOG    15 g    Apply sparingly to affected area three times daily. 1-2 weeks , as needed    Rash and nonspecific skin eruption       TYLENOL PO      Take 500 mg by mouth every 6 hours as needed for mild pain or fever        UNABLE TO FIND      MEDICATION NAME: Fresh Coat eye drops        VITAMIN D3 PO      Take 1,000 Units by mouth daily        warfarin 4 MG tablet    COUMADIN    110 tablet    TAKE 1-2 TABLETS BY MOUTH EVERY DAY AS DIRECTED BY INR CLINIC    Paroxysmal atrial fibrillation (H), Long-term (current) use of anticoagulants       ZOMETA IV      Inject into the vein every 30 days Every 3 month dosing        * Notice:  This list has 3 medication(s) that are the same as other medications prescribed for you. Read the directions carefully, and ask your doctor or other care provider to review them with you.

## 2018-11-21 NOTE — TELEPHONE ENCOUNTER
Called patient to discuss INR results/warfarin dosing     See open [facility] encounter    Pt's spouse states she is not home, recall this afternoon    Aruna ROBERSON RN

## 2018-11-21 NOTE — PATIENT INSTRUCTIONS
Continue chemotherapy. Scheduled / Tia  Follow up in 1 month. Scheduled /Tia    The patient is in [place] [facility]- LW        Avs Printed and given to patient

## 2018-11-21 NOTE — Clinical Note
"    11/21/2018         RE: Amira Arreola  [address]  [place] [address]        Dear Colleague,    Thank you for referring your patient, Amira Arreola, to the [facility]. Please see a copy of my visit note below.    Oncology Rooming Note    November 21, 2018 10:24 AM   Amira Arreola is a 86 year old female who presents for:    Chief Complaint   Patient presents with     Oncology Clinic Visit     Multiple myeloma not having achieved remission      Initial Vitals: /87 (BP Location: Left arm, Patient Position: Sitting, Cuff Size: Adult Regular)  Pulse 71  Temp 98.4  F (36.9  C) (Oral)  Resp 16  Ht 1.626 m (5' 4.02\")  Wt 60.8 kg (134 lb)  SpO2 98%  BMI 22.99 kg/m2 Estimated body mass index is 22.99 kg/(m^2) as calculated from the following:    Height as of this encounter: 1.626 m (5' 4.02\").    Weight as of this encounter: 60.8 kg (134 lb). Body surface area is 1.66 meters squared.  No Pain (0) Comment: Data Unavailable   No LMP recorded. Patient is postmenopausal.  Allergies reviewed: Yes  Medications reviewed: Yes    Medications: MEDICATION REFILLS NEEDED TODAY. Provider was notified. 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     1. On 09/21/2015, WBC of 4.2, hemoglobin of 13.2 and platelets of 138.    -On 09/29/2015, SPEP does not reveal any M-spike.   -On 10/02/2015, JANET does not reveal any monoclonal protein.     -On 10/22/2015, urine immunofixation reveals monoclonal free kappa light chain.    2. On 05/11/2016, kappa light chain of 50, lambda light chain of 0.32 and ratio of kappa to lambda " of 156.2.  3. Bone marrow biopsy on 05/25/2016 reveals 40-50% kappa light chain restricted plasma cells.  Cytogenetics is normal. FISH panel reveals translocation 11;14.    4. MRI of bones on 06/21/2016 and 06/22/2016 reveals myeloma lesions.  5. On 08/24/2016, she was started on revlimid with dexamethasone 20 mg weekly. She did not have any significant response to treatment.   6. Velcade and dexamethasone started on 03/21/2017.    7. Daratumumab added to velcade and dexamethasone on 05/31/2017.   -Velcade given every 14 days starting 08/01/2018.     SUBJECTIVE:  Mrs. Arreola is an 86-year-old female with kappa free light chain multiple myeloma.  She is currently on Velcade, dexamethasone and daratumumab.  We are giving her Velcade every 2 weeks for convenience.  Overall, her kappa free light chain has been stable.      Patient's main problem is fatigue.  Many nights she does not get good night's sleep.  Often it is because of urinary frequency.      No headache.  No dizziness.  No chest pain.  No shortness of breath.  No nausea or vomiting.  Appetite is fairly good.  No bowel problem.  No bleeding.  No fever or chills.      Patient has chronic back pain mainly in the upper back.  She takes oxycodone which helps.  She wants a refill, which will be given.      PHYSICAL EXAMINATION:   Alert and oriented x 3. ECOG PS of 2.  EYES:  No icterus.   THROAT:  No ulcer or thrush.   NECK:  Supple. No lymphadenopathy.   AXILLAE:  No lymphadenopathy.   LUNGS:  Good air entry bilaterally.  No crackles or wheezing.   HEART:  Regular.  No murmur.   ABDOMEN:  Soft and nontender.  No mass.   EXTREMITIES: Bilateral pedal edema. No calf swelling or tenderness.   SKIN: No petechia.     LABORATORY DATA:  Reviewed.      ASSESSMENT:   1.  An 86-year-old female with kappa free light chain multiple myeloma on chemotherapy.  Myeloma is stable.   2.  Fatigue secondary to her age, multiple myeloma, chemotherapy, anemia and decreased sleep.   3.   Chronic upper back pain from myeloma and arthritis.   4.  Peripheral neuropathy, stable.   5.  Interrupted sleep due to urinary frequency.      PLAN:   1.  Patient is stable from myeloma.  Labs are reviewed.  On 10/24/2018, kappa free light chain is still very low at 2.08.  It used to be high at 60. For myeloma, she will continue on daratumumab, dexamethasone and Velcade.  For convenience, she gets Velcade every 2 weeks, which will be given.  Side effects reviewed.    2.  For bone health, she gets Zometa every 3 months, which will be continued.  She is not having dental or jaw related symptoms.   3.  Prescription of oxycodone for upper back pain given. It controls the pain.   4.  She has fatigue.  Advised her to rest adequately.   5.  She had a few questions, which were all answered.  I will see her in a month.  Advised her to see a physician sooner if she has fever, chills, worsening weakness, shortness of breath, recurrent vomiting, bleeding or any other concerns.         ITZ LOPEZ MD             D: 2018   T: 2018   MT: TAMMY      Name:     ALBERTO PARSON   MRN:      [id number]        Account:      [id number]   :      1932           Visit Date:   2018      Document: [id number]        Again, thank you for allowing me to participate in the care of your patient.        Sincerely,        Itz Lopez MD

## 2018-11-21 NOTE — PROGRESS NOTES
Infusion Nursing Note:  Amira Arreola presents today for Chemotherapy.    Patient seen by provider today: Yes: Dr Roberts   present during visit today: Not Applicable.    Note: N/A.    Intravenous Access:  Labs drawn without difficulty.  Implanted Port.    Treatment Conditions:  Lab Results   Component Value Date    HGB 11.3 11/21/2018     Lab Results   Component Value Date    WBC 7.2 11/21/2018      Lab Results   Component Value Date    ANEU 5.8 11/21/2018     Lab Results   Component Value Date     11/21/2018      Lab Results   Component Value Date     09/20/2018                   Lab Results   Component Value Date    POTASSIUM 3.8 09/20/2018           No results found for: MAG         Lab Results   Component Value Date    CR 0.71 10/24/2018                   Lab Results   Component Value Date    BRADLEY 9.4 10/24/2018                Lab Results   Component Value Date    BILITOTAL 0.7 11/21/2018           Lab Results   Component Value Date    ALBUMIN 3.2 11/21/2018                    Lab Results   Component Value Date    ALT 23 11/21/2018           Lab Results   Component Value Date    AST 19 11/21/2018       Results reviewed, labs MET treatment parameters, ok to proceed with treatment.      Post Infusion Assessment:  Patient tolerated infusion without incident.  Patient tolerated injection without incident.  Blood return noted pre and post infusion.  Site patent and intact, free from redness, edema or discomfort.  No evidence of extravasations.  Access discontinued per protocol.    Discharge Plan:   Discharge instructions reviewed with: Patient.  Patient and/or family verbalized understanding of discharge instructions and all questions answered.  Copy of AVS reviewed with patient and/or family.  Patient will return 12/5/2018 for next appointment.  Patient discharged in stable condition accompanied by: self.  Departure Mode: Ambulatory.    Thelma Acosta RN

## 2018-11-21 NOTE — MR AVS SNAPSHOT
Amira Arreola   11/21/2018   Anticoagulation Therapy Visit    Description:  86 year old female   Provider:  Addy Frias MD   Department:  [facility]           INR as of 11/21/2018     Today's INR 3.21!      Anticoagulation Summary as of 11/21/2018     INR goal 2.0-3.0   Today's INR 3.21!   Full warfarin instructions 2 mg on Fri; 4 mg all other days   Next INR check 12/5/2018    Indications   Long-term (current) use of anticoagulants [Z79.01] [Z79.01]  Atrial fibrillation (H) [I48.91] (Resolved) [I48.91]         November 2018 Details    Sun Mon Tue Wed Thu Fri Sat         1               2               3                 4               5               6               7               8               9               10                 11               12               13               14               15               16               17                 18               19               20               21      4 mg   See details      22      4 mg         23      2 mg         24      4 mg           25      4 mg         26      4 mg         27      4 mg         28      4 mg         29      4 mg         30      2 mg           Date Details   11/21 This INR check               How to take your warfarin dose     To take:  2 mg Take 0.5 of a 4 mg tablet.    To take:  4 mg Take 1 of the 4 mg tablets.           December 2018 Details    Sun Mon Tue Wed Thu Fri Sat           1      4 mg           2      4 mg         3      4 mg         4      4 mg         5            6               7               8                 9               10               11               12               13               14               15                 16               17               18               19               20               21               22                 23               24               25               26               27               28               29                 30               31                      Date Details   No additional details    Date of next INR:  12/5/2018         How to take your warfarin dose     To take:  4 mg Take 1 of the 4 mg tablets.

## 2018-11-21 NOTE — MR AVS SNAPSHOT
After Visit Summary   11/21/2018    Amira Arreola    MRN: [id number]           Patient Information     Date Of Birth          7/17/1932        Visit Information        Provider Department      11/21/2018 10:00 AM Shayne Roberts MD [facility]        Today's Diagnoses     Multiple myeloma not having achieved remission (H)    -  1      Care Instructions    Continue chemotherapy. Scheduled / Tia  Follow up in 1 month. Scheduled /Tia    The patient is in [place] [employer] [place]        Avs Printed and given to patient          Follow-ups after your visit        Your next 10 appointments already scheduled     Dec 05, 2018  9:30 AM CST   Level 2 with SH INFUSION CHAIR 6   [facility] ([facility])    [facility] [place]  [address]  [place] [address]   [phone]            Dec 19, 2018  9:30 AM CST   Level 2 with SH INFUSION CHAIR 7   [facility] ([facility])    [facility] [place]  [address]  [place] [address]   [phone]            Dec 19, 2018 10:20 AM CST   Return Visit with Shayne Roberts MD   [facility] ([facility])    [facility]  [address]  [place] [address]   [phone]              Who to contact     If you have questions or need follow up information about today's clinic visit or your schedule please contact [facility] directly at [phone].  Normal or non-critical lab and imaging results will be communicated to you by MyChart, letter or phone within 4 business days after the clinic has received the results. If you do not hear from us within 7 days, please contact the clinic through [employer] or phone. If you have a critical or abnormal lab result, we will notify you by phone as soon as possible.  Submit refill requests through [employer] or call your  "pharmacy and they will forward the refill request to us. Please allow 3 business days for your refill to be completed.          Additional Information About Your Visit        Care EveryWhere ID     This is your Care EveryWhere ID. This could be used by other organizations to access your [facility] medical records  [id number]        Your Vitals Were     Pulse Temperature Respirations Height Pulse Oximetry BMI (Body Mass Index)    71 98.4  F (36.9  C) (Oral) 16 1.626 m (5' 4.02\") 98% 22.99 kg/m2       Blood Pressure from Last 3 Encounters:   11/21/18 157/87   11/07/18 114/75   11/02/18 111/67    Weight from Last 3 Encounters:   11/21/18 60.8 kg (134 lb)   11/21/18 60.8 kg (134 lb)   11/07/18 60.5 kg (133 lb 6.4 oz)              Today, you had the following     No orders found for display         Today's Medication Changes          These changes are accurate as of 11/21/18 11:02 AM.  If you have any questions, ask your nurse or doctor.               These medicines have changed or have updated prescriptions.        Dose/Directions    * dexamethasone 4 MG tablet   Commonly known as:  DECADRON   This may have changed:  Another medication with the same name was added. Make sure you understand how and when to take each.   Used for:  Multiple myeloma not having achieved remission (H)        Take 20mg (5 tablets) by mouth every week.   Quantity:  28 tablet   Refills:  0       * dexamethasone 4 MG tablet   Commonly known as:  DECADRON   This may have changed:  Another medication with the same name was added. Make sure you understand how and when to take each.   Used for:  Multiple myeloma not having achieved remission (H)        Take 20mg (5 tablets) by mouth every week.   Quantity:  28 tablet   Refills:  0       * dexamethasone 4 MG tablet   Commonly known as:  DECADRON   This may have changed:  You were already taking a medication with the same name, and this prescription was added. Make sure you understand how and when to " take each.   Used for:  Multiple myeloma not having achieved remission (H)        Take 20mg (5 tablets) by mouth every week.   Quantity:  28 tablet   Refills:  0       * Notice:  This list has 3 medication(s) that are the same as other medications prescribed for you. Read the directions carefully, and ask your doctor or other care provider to review them with you.         Where to get your medicines      These medications were [id number] [address] [address] [address] [facility] [address] [facility] [address]  [address] [facility] [facility] [address], [place] [address]     Phone:  [phone]     dexamethasone 4 MG tablet         Some of these will need a paper prescription and others can be bought over the counter.  Ask your nurse if you have questions.     Bring a paper prescription for each of these medications     oxyCODONE IR 15 MG tablet               Information about OPIOIDS     PRESCRIPTION OPIOIDS: WHAT YOU NEED TO KNOW   We gave you an opioid (narcotic) pain medicine. It is important to manage your pain, but opioids are not always the best choice. You should first try all the other options your care team gave you. Take this medicine for as short a time (and as few doses) as possible.    Some activities can increase your pain, such as bandage changes or therapy sessions. It may help to take your pain medicine 30 to 60 minutes before these activities. Reduce your stress by getting enough sleep, working on hobbies you enjoy and practicing relaxation or meditation. Talk to your care team about ways to manage your pain beyond prescription opioids.    These medicines have risks:    DO NOT drive when on new or higher doses of pain medicine. These medicines can affect your alertness and reaction times, and you could be arrested for driving under the influence (DUI). If you need to use opioids long-term, talk to your care team about driving.    DO NOT operate heavy machinery    DO NOT do any other dangerous activities while  taking these medicines.    DO NOT drink any alcohol while taking these medicines.     If the opioid prescribed includes acetaminophen, DO NOT take with any other medicines that contain acetaminophen. Read all labels carefully. Look for the word  acetaminophen  or  Tylenol.  Ask your pharmacist if you have questions or are unsure.    You can get addicted to pain medicines, especially if you have a history of addiction (chemical, alcohol or substance dependence). Talk to your care team about ways to reduce this risk.    All opioids tend to cause constipation. Drink plenty of water and eat foods that have a lot of fiber, such as fruits, vegetables, prune juice, apple juice and high-fiber cereal. Take a laxative (Miralax, milk of magnesia, Colace, Senna) if you don t move your bowels at least every other day. Other side effects include upset stomach, sleepiness, dizziness, throwing up, tolerance (needing more of the medicine to have the same effect), physical dependence and slowed breathing.    Store your pills in a secure place, locked if possible. We will not replace any lost or stolen medicine. If you don t finish your medicine, please throw away (dispose) as directed by your pharmacist. The [place] Pollution Control Agency has more information about safe disposal: [email or web address]-medications         Primary Care Provider Office Phone # Fax #    Addy Frias [phone] [address] [facility] [address]  [facility] [address]        Equal Access to Services     SARA ZELAYA : Hadii liane murcia hadasho Sojaylonali, waaxda luqadaha, qaybta kaalmada adesergioyada, hung sadler. So [facility] [phone].    ATENCIÓN: Si habla español, tiene a barlow disposición servicios gratuitos de asistencia lingüística. Llame al [phone].    We comply with applicable federal civil rights laws and [place] laws. We do not discriminate on the basis of race,  color, national origin, age, disability, sex, sexual orientation, or gender identity.            Thank you!     Thank you for choosing [facility]  for your care. Our goal is always to provide you with excellent care. Hearing back from our patients is one way we can continue to improve our services. Please take a few minutes to complete the written survey that you may receive in the mail after your visit with us. Thank you!             Your Updated Medication List - Protect others around you: Learn how to safely use, store and throw away your medicines at www.disposemymeds.org.          This list is accurate as of 11/21/18 11:02 AM.  Always use your most recent med list.                   Brand Name Dispense Instructions for use Diagnosis    ACYCLOVIR PO      Take 400 mg by mouth 2 times daily        BENADRYL PO      Take 25 mg by mouth nightly as needed        CALCIUM CITRATE + PO      Take 2,000 mg by mouth daily 2 tabs        carboxymethylcellulose 0.5 % Soln ophthalmic solution    REFRESH PLUS     1 drop 4 times daily        CLARINEX PO      Take by mouth daily Taking claritin        COMPAZINE PO      Take 10 mg by mouth daily as needed        cycloSPORINE 0.05 % ophthalmic emulsion    RESTASIS     Place 1 drop into both eyes every 12 hours        DAILY MULTIVITAMIN PO      Take 1 tablet by mouth daily.    Routine general medical examination at a health care facility       * dexamethasone 4 MG tablet    DECADRON    28 tablet    Take 20mg (5 tablets) by mouth every week.    Multiple myeloma not having achieved remission (H)       * dexamethasone 4 MG tablet    DECADRON    28 tablet    Take 20mg (5 tablets) by mouth every week.    Multiple myeloma not having achieved remission (H)       * dexamethasone 4 MG tablet    DECADRON    28 tablet    Take 20mg (5 tablets) by mouth every week.    Multiple myeloma not having achieved remission (H)       lidocaine-prilocaine cream    EMLA    30 g    Apply to port  site 1 hour prior to access    Multiple myeloma not having achieved remission (H)       LORazepam 0.5 MG tablet    ATIVAN    30 tablet    Take 1 tablet (0.5 mg) by mouth every 8 hours as needed for anxiety    Multiple myeloma not having achieved remission (H)       metoprolol succinate 50 MG 24 hr tablet    TOPROL-XL    270 tablet    TAKE 2 TABLETS BY MOUTH EVERY MORNING, AND 1 TABLET EVERY EVENING    Paroxysmal atrial fibrillation (H)       oxyCODONE IR 15 MG tablet    ROXICODONE    90 tablet    Take 1 tablet (15 mg) by mouth every 8 hours as needed for pain maximum 4 tablet(s) per day    Multiple myeloma not having achieved remission (H)       polyethylene glycol powder    MIRALAX/GLYCOLAX     Take 1 capful by mouth daily as needed    Bilateral leg edema       REFRESH OP           SIMBRINZA 1-0.2 % ophthalmic suspension   Generic drug:  brinzolamide-brimonidine      Place 1 drop into the right eye 2 times daily 1 drop AM and PM        triamcinolone 0.5 % cream    KENALOG    15 g    Apply sparingly to affected area three times daily. 1-2 weeks , as needed    Rash and nonspecific skin eruption       TYLENOL PO      Take 500 mg by mouth every 6 hours as needed for mild pain or fever        UNABLE TO FIND      MEDICATION NAME: Fresh Coat eye drops        VITAMIN D3 PO      Take 1,000 Units by mouth daily        warfarin 4 MG tablet    COUMADIN    110 tablet    TAKE 1-2 TABLETS BY MOUTH EVERY DAY AS DIRECTED BY INR CLINIC    Paroxysmal atrial fibrillation (H), Long-term (current) use of anticoagulants       ZOMETA IV      Inject into the vein every 30 days Every 3 month dosing        * Notice:  This list has 3 medication(s) that are the same as other medications prescribed for you. Read the directions carefully, and ask your doctor or other care provider to review them with you.

## 2018-11-22 NOTE — PROGRESS NOTES
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     1. On 09/21/2015, WBC of 4.2, hemoglobin of 13.2 and platelets of 138.    -On 09/29/2015, SPEP does not reveal any M-spike.   -On 10/02/2015, JANET does not reveal any monoclonal protein.     -On 10/22/2015, urine immunofixation reveals monoclonal free kappa light chain.    2. On 05/11/2016, kappa light chain of 50, lambda light chain of 0.32 and ratio of kappa to lambda of 156.2.  3. Bone marrow biopsy on 05/25/2016 reveals 40-50% kappa light chain restricted plasma cells.  Cytogenetics is normal. FISH panel reveals translocation 11;14.    4. MRI of bones on 06/21/2016 and 06/22/2016 reveals myeloma lesions.  5. On 08/24/2016, she was started on revlimid with dexamethasone 20 mg weekly. She did not have any significant response to treatment.   6. Velcade and dexamethasone started on 03/21/2017.    7. Daratumumab added to velcade and dexamethasone on 05/31/2017.   -Velcade given every 14 days starting 08/01/2018.     SUBJECTIVE:  Mrs. Arreola is an 86-year-old female with kappa free light chain multiple myeloma.  She is currently on Velcade, dexamethasone and daratumumab.  We are giving her Velcade every 2 weeks for convenience.  Overall, her kappa free light chain has been stable.      Patient's main problem is fatigue.  Many nights she does not get good night's sleep.  Often it is because of urinary frequency.      No headache.  No dizziness.  No chest pain.  No shortness of breath.  No nausea or vomiting.  Appetite is fairly good.  No bowel problem.  No bleeding.  No fever or chills.      Patient has chronic back pain mainly in the upper back.  She takes oxycodone which helps.  She wants a refill, which will be given.      PHYSICAL EXAMINATION:   Alert and oriented x 3. ECOG PS of 2.  EYES:  No icterus.   THROAT:  No ulcer or thrush.   NECK:  Supple. No lymphadenopathy.   AXILLAE:   No lymphadenopathy.   LUNGS:  Good air entry bilaterally.  No crackles or wheezing.   HEART:  Regular.  No murmur.   ABDOMEN:  Soft and nontender.  No mass.   EXTREMITIES: Bilateral pedal edema. No calf swelling or tenderness.   SKIN: No petechia.     LABORATORY DATA:  Reviewed.      ASSESSMENT:   1.  An 86-year-old female with kappa free light chain multiple myeloma on chemotherapy.  Myeloma is stable.   2.  Fatigue secondary to her age, multiple myeloma, chemotherapy, anemia and decreased sleep.   3.  Chronic upper back pain from myeloma and arthritis.   4.  Peripheral neuropathy, stable.   5.  Interrupted sleep due to urinary frequency.      PLAN:   1.  Patient is stable from myeloma.  Labs are reviewed.  On 10/24/2018, kappa free light chain is still very low at 2.08.  It used to be high at 60. For myeloma, she will continue on daratumumab, dexamethasone and Velcade.  For convenience, she gets Velcade every 2 weeks, which will be given.  Side effects reviewed.    2.  For bone health, she gets Zometa every 3 months, which will be continued.  She is not having dental or jaw related symptoms.   3.  Prescription of oxycodone for upper back pain given. It controls the pain.   4.  She has fatigue.  Advised her to rest adequately.   5.  She had a few questions, which were all answered.  I will see her in a month.  Advised her to see a physician sooner if she has fever, chills, worsening weakness, shortness of breath, recurrent vomiting, bleeding or any other concerns.         ITZ LOPEZ MD             D: 2018   T: 2018   MT: TAMMY      Name:     ALBERTO PARSON   MRN:      [id number]        Account:      [id number]   :      1932           Visit Date:   2018      Document: [id number]

## 2018-11-23 ENCOUNTER — TELEPHONE (OUTPATIENT)
Dept: ONCOLOGY | Facility: CLINIC | Age: 83
End: 2018-11-23

## 2018-11-23 DIAGNOSIS — C90.00 MULTIPLE MYELOMA NOT HAVING ACHIEVED REMISSION (H): ICD-10-CM

## 2018-11-23 LAB
ALBUMIN SERPL ELPH-MCNC: 3.4 G/DL (ref 3.7–5.1)
ALPHA1 GLOB SERPL ELPH-MCNC: 0.3 G/DL (ref 0.2–0.4)
ALPHA2 GLOB SERPL ELPH-MCNC: 0.7 G/DL (ref 0.5–0.9)
B-GLOBULIN SERPL ELPH-MCNC: 0.6 G/DL (ref 0.6–1)
GAMMA GLOB SERPL ELPH-MCNC: 0.2 G/DL (ref 0.7–1.6)
KAPPA LC UR-MCNC: 3.06 MG/DL (ref 0.33–1.94)
KAPPA LC/LAMBDA SER: ABNORMAL {RATIO} (ref 0.26–1.65)
LAMBDA LC SERPL-MCNC: <0.25 MG/DL (ref 0.57–2.63)
M PROTEIN SERPL ELPH-MCNC: 0 G/DL
PROT PATTERN SERPL ELPH-IMP: ABNORMAL

## 2018-11-23 RX ORDER — OXYCODONE HYDROCHLORIDE 15 MG/1
15 TABLET ORAL EVERY 8 HOURS PRN
Qty: 90 TABLET | Refills: 0 | Status: SHIPPED | OUTPATIENT
Start: 2018-11-23 | End: 2018-12-19

## 2018-11-23 NOTE — TELEPHONE ENCOUNTER
Patient called stating that she lost her printed Script of Oxycodone that was given to her on 11/21/18. Consulted with pharmacy who looked in Data base and patient has not picked it up. Script was reordered and signed by GEETHA Loya. Patient is aware and she stated that she will pick it up with her next chemotherapy cycle 12/5/18. Script will be kept at  for patient to .

## 2018-11-27 NOTE — PROGRESS NOTES
ANTICOAGULATION FOLLOW-UP CLINIC VISIT    Patient Name:  Amira Arreola  Date:  11/27/2018  Contact Type:  Telephone    SUBJECTIVE:     Patient Findings     Positives No Problem Findings    Comments Recent cancer infusion, as number not very out of range and would be expected a little elevated with infusion, gave ok to recheck in 2 weeks, and to call if any sx of concern.           OBJECTIVE    INR   Date Value Ref Range Status   11/21/2018 3.21 (H) 0.86 - 1.14 Final       ASSESSMENT / PLAN  INR assessment THER    Recheck INR In: 2 WEEKS    INR Location Clinic      Anticoagulation Summary as of 11/21/2018     INR goal 2.0-3.0   Today's INR 3.21!   Warfarin maintenance plan 2 mg (4 mg x 0.5) on Fri; 4 mg (4 mg x 1) all other days   Full warfarin instructions 2 mg on Fri; 4 mg all other days   Weekly warfarin total 26 mg   No change documented Aruna Fajardo RN   Plan last modified Tigist Corral RN (10/25/2018)   Next INR check 12/5/2018   Target end date Indefinite    Indications   Long-term (current) use of anticoagulants [Z79.01] [Z79.01]  Atrial fibrillation (H) [I48.91] (Resolved) [I48.91]         Anticoagulation Episode Summary     INR check location     Preferred lab     Send INR reminders to [facility] ANTICOAGULATION    Comments  INR to be drawn at infusion visit OR home care nurse. Watch result notes. Patient can be reached at home phone [phone]. Do not leave dosing with spouse.   Pt advised to call for triage if not hearing back from us for dosing.      Anticoagulation Care Providers     Provider Role Specialty Phone number    Addy Frias MD [facility] Internal Medicine [phone]            See the Encounter Report to view Anticoagulation Flowsheet and Dosing Calendar (Go to Encounters tab in chart review, and find the Anticoagulation Therapy Visit)    Pt advised of anticoagulation plan of 2 mg on Fridays and 4 mg all the other days. Recheck in 2 weeks.    Bernarda Thomas RN

## 2018-11-30 ENCOUNTER — TELEPHONE (OUTPATIENT)
Dept: FAMILY MEDICINE | Facility: CLINIC | Age: 83
End: 2018-11-30

## 2018-11-30 ENCOUNTER — ANTICOAGULATION THERAPY VISIT (OUTPATIENT)
Dept: FAMILY MEDICINE | Facility: CLINIC | Age: 83
End: 2018-11-30
Payer: COMMERCIAL

## 2018-11-30 LAB — INR PPP: 1.9

## 2018-11-30 NOTE — PROGRESS NOTES
ANTICOAGULATION FOLLOW-UP CLINIC VISIT    Patient Name:  Amira Arreola  Date:  11/30/2018  Contact Type:  Phone with homecare nurse    SUBJECTIVE:     Patient Findings     Positives Missed doses    Comments Pt possibly missed a dose, but unsure.  Removed half a dose from mar              OBJECTIVE    INR   Date Value Ref Range Status   11/30/2018 1.9  Final       ASSESSMENT / PLAN  INR assessment THER    Recheck INR In: 5 DAYS    INR Location Homecare INR      Anticoagulation Summary as of 11/30/2018     INR goal 2.0-3.0   Today's INR 1.9!   Warfarin maintenance plan 2 mg (4 mg x 0.5) on Fri; 4 mg (4 mg x 1) all other days   Full warfarin instructions 2 mg on Fri; 4 mg all other days   Weekly warfarin total 26 mg   No change documented Jessica Moody RN   Plan last modified Tigist Corrla RN (10/25/2018)   Next INR check 12/5/2018   Target end date Indefinite    Indications   Long-term (current) use of anticoagulants [Z79.01] [Z79.01]  Atrial fibrillation (H) [I48.91] (Resolved) [I48.91]         Anticoagulation Episode Summary     INR check location     Preferred lab     Send INR reminders to [facility] ANTICOAGULATION    Comments  INR to be drawn at infusion visit OR home care nurse. Watch result notes. Patient can be reached at home phone [phone]. Do not leave dosing with spouse.   Pt advised to call for triage if not hearing back from us for dosing.      Anticoagulation Care Providers     Provider Role Specialty Phone number    Addy Frias MD [facility] Internal Medicine [phone]            See the Encounter Report to view Anticoagulation Flowsheet and Dosing Calendar (Go to Encounters tab in chart review, and find the Anticoagulation Therapy Visit)    Dosage adjustment made based on physician directed care plan.    Jessica Moody, RN

## 2018-11-30 NOTE — MR AVS SNAPSHOT
Amira Arreola   11/30/2018   Anticoagulation Therapy Visit    Description:  86 year old female   Provider:  Addy Frias MD   Department:  [facility]           INR as of 11/30/2018     Today's INR 1.9!      Anticoagulation Summary as of 11/30/2018     INR goal 2.0-3.0   Today's INR 1.9!   Full warfarin instructions 2 mg on Fri; 4 mg all other days   Next INR check 12/5/2018    Indications   Long-term (current) use of anticoagulants [Z79.01] [Z79.01]  Atrial fibrillation (H) [I48.91] (Resolved) [I48.91]         November 2018 Details    Sun Mon Tue Wed Thu Fri Sat         1               2               3                 4               5               6               7               8               9               10                 11               12               13               14               15               16               17                 18               19               20               21               22               23               24                 25               26               27               28               29               30      2 mg   See details        Date Details   11/30 This INR check               How to take your warfarin dose     To take:  2 mg Take 0.5 of a 4 mg tablet.           December 2018 Details    Sun Mon Tue Wed Thu Fri Sat           1      4 mg           2      4 mg         3      4 mg         4      4 mg         5            6               7               8                 9               10               11               12               13               14               15                 16               17               18               19               20               21               22                 23               24               25               26               27               28               29                 30               31                     Date Details   No additional details    Date of next INR:  12/5/2018          How to take your warfarin dose     To take:  4 mg Take 1 of the 4 mg tablets.

## 2018-11-30 NOTE — TELEPHONE ENCOUNTER
Reason for Call:  INR    Who is calling?  [employer]    Phone number:  [phone]    Fax number:  n/a    Name of caller: Senia    INR Value:  1.9    Are there any other concerns:  No    Can we leave a detailed message on this number? YES        Call taken on 11/30/2018 at 11:23 AM by Pacheco Villagran

## 2018-12-02 ENCOUNTER — TRANSFERRED RECORDS (OUTPATIENT)
Dept: HEALTH INFORMATION MANAGEMENT | Facility: CLINIC | Age: 83
End: 2018-12-02

## 2018-12-05 ENCOUNTER — INFUSION THERAPY VISIT (OUTPATIENT)
Dept: INFUSION THERAPY | Facility: CLINIC | Age: 83
End: 2018-12-05
Attending: INTERNAL MEDICINE
Payer: MEDICARE

## 2018-12-05 ENCOUNTER — HOSPITAL ENCOUNTER (OUTPATIENT)
Facility: CLINIC | Age: 83
Setting detail: SPECIMEN
Discharge: HOME | End: 2018-12-05
Attending: INTERNAL MEDICINE | Admitting: INTERNAL MEDICINE
Payer: MEDICARE

## 2018-12-05 VITALS
HEIGHT: 64 IN | RESPIRATION RATE: 18 BRPM | HEART RATE: 76 BPM | DIASTOLIC BLOOD PRESSURE: 86 MMHG | TEMPERATURE: 98.3 F | BODY MASS INDEX: 23.56 KG/M2 | WEIGHT: 138 LBS | SYSTOLIC BLOOD PRESSURE: 140 MMHG

## 2018-12-05 DIAGNOSIS — Z95.828 PORT-A-CATH IN PLACE: ICD-10-CM

## 2018-12-05 DIAGNOSIS — I48.0 PAROXYSMAL ATRIAL FIBRILLATION (H): ICD-10-CM

## 2018-12-05 DIAGNOSIS — C90.00 MULTIPLE MYELOMA NOT HAVING ACHIEVED REMISSION (H): Primary | ICD-10-CM

## 2018-12-05 LAB
BASOPHILS # BLD AUTO: 0 10E9/L (ref 0–0.2)
BASOPHILS NFR BLD AUTO: 0.1 %
DIFFERENTIAL METHOD BLD: ABNORMAL
EOSINOPHIL # BLD AUTO: 0.1 10E9/L (ref 0–0.7)
EOSINOPHIL NFR BLD AUTO: 0.8 %
ERYTHROCYTE [DISTWIDTH] IN BLOOD BY AUTOMATED COUNT: 15.1 % (ref 10–15)
HCT VFR BLD AUTO: 34.5 % (ref 35–47)
HGB BLD-MCNC: 11.3 G/DL (ref 11.7–15.7)
IMM GRANULOCYTES # BLD: 0 10E9/L (ref 0–0.4)
IMM GRANULOCYTES NFR BLD: 0.1 %
INR PPP: 2.32 (ref 0.86–1.14)
LYMPHOCYTES # BLD AUTO: 1.1 10E9/L (ref 0.8–5.3)
LYMPHOCYTES NFR BLD AUTO: 14.2 %
MCH RBC QN AUTO: 32.1 PG (ref 26.5–33)
MCHC RBC AUTO-ENTMCNC: 32.8 G/DL (ref 31.5–36.5)
MCV RBC AUTO: 98 FL (ref 78–100)
MONOCYTES # BLD AUTO: 0.8 10E9/L (ref 0–1.3)
MONOCYTES NFR BLD AUTO: 10.4 %
NEUTROPHILS # BLD AUTO: 5.6 10E9/L (ref 1.6–8.3)
NEUTROPHILS NFR BLD AUTO: 74.4 %
NRBC # BLD AUTO: 0 10*3/UL
NRBC BLD AUTO-RTO: 0 /100
PLATELET # BLD AUTO: 186 10E9/L (ref 150–450)
RBC # BLD AUTO: 3.52 10E12/L (ref 3.8–5.2)
WBC # BLD AUTO: 7.5 10E9/L (ref 4–11)

## 2018-12-05 PROCEDURE — 85610 PROTHROMBIN TIME: CPT | Performed by: INTERNAL MEDICINE

## 2018-12-05 PROCEDURE — 96401 CHEMO ANTI-NEOPL SQ/IM: CPT

## 2018-12-05 PROCEDURE — 85025 COMPLETE CBC W/AUTO DIFF WBC: CPT | Performed by: INTERNAL MEDICINE

## 2018-12-05 PROCEDURE — 25000128 H RX IP 250 OP 636: Mod: JW | Performed by: INTERNAL MEDICINE

## 2018-12-05 RX ORDER — HEPARIN SODIUM (PORCINE) LOCK FLUSH IV SOLN 100 UNIT/ML 100 UNIT/ML
500 SOLUTION INTRAVENOUS EVERY 8 HOURS
Status: DISCONTINUED | OUTPATIENT
Start: 2018-12-05 | End: 2018-12-05 | Stop reason: HOSPADM

## 2018-12-05 RX ORDER — HEPARIN SODIUM (PORCINE) LOCK FLUSH IV SOLN 100 UNIT/ML 100 UNIT/ML
500 SOLUTION INTRAVENOUS EVERY 8 HOURS
Status: CANCELLED
Start: 2018-12-05

## 2018-12-05 RX ADMIN — BORTEZOMIB 2.3 MG: 3.5 INJECTION, POWDER, LYOPHILIZED, FOR SOLUTION INTRAVENOUS; SUBCUTANEOUS at 10:45

## 2018-12-05 RX ADMIN — SODIUM CHLORIDE, PRESERVATIVE FREE 500 UNITS: 5 INJECTION INTRAVENOUS at 10:08

## 2018-12-05 NOTE — MR AVS SNAPSHOT
After Visit Summary   12/5/2018    Amira Arreola    MRN: [id number]           Patient Information     Date Of Birth          7/17/1932        Visit Information        Provider Department      12/5/2018 9:30 AM  INFUSION CHAIR 6 [facility]        Today's Diagnoses     Multiple myeloma not having achieved remission (H)    -  1    Portacath in place        Paroxysmal atrial fibrillation (H)           Follow-ups after your visit        Your next 10 appointments already scheduled     Dec 19, 2018  9:30 AM CST   Level 2 with  INFUSION CHAIR 7   [facility] ([facility])    [facility]  [address]  [place] [address]   [phone]            Dec 19, 2018 10:20 AM CST   Return Visit with Shayne Roberts MD   [facility] ([facility])    [facility]  [address]  [place] [address] [phone]              Who to contact     If you have questions or need follow up information about today's clinic visit or your schedule please contact [facility] directly at [phone].  Normal or non-critical lab and imaging results will be communicated to you by MyChart, letter or phone within 4 business days after the clinic has received the results. If you do not hear from us within 7 days, please contact the clinic through MyChart or phone. If you have a critical or abnormal lab result, we will notify you by phone as soon as possible.  Submit refill requests through [employer] or call your pharmacy and they will forward the refill request to us. Please allow 3 business days for your refill to be completed.          Additional Information About Your Visit        Care EveryWhere ID     This is your Care EveryWhere ID. This could be used by other organizations to access your [facility]  "records  [id number]        Your Vitals Were     Pulse Temperature Respirations Height BMI (Body Mass Index)       76 98.3  F (36.8  C) (Oral) 18 1.626 m (5' 4.02\") 23.68 kg/m2        Blood Pressure from Last 3 Encounters:   12/05/18 140/86   11/21/18 157/87   11/21/18 (!) 143/94    Weight from Last 3 Encounters:   12/05/18 62.6 kg (138 lb)   11/21/18 60.8 kg (134 lb)   11/21/18 60.8 kg (134 lb)              We Performed the Following     CBC with platelets differential     INR          Today's Medication Changes          These changes are accurate as of 12/5/18 10:06 AM.  If you have any questions, ask your nurse or doctor.               These medicines have changed or have updated prescriptions.        Dose/Directions    dexamethasone 4 MG tablet   Commonly known as:  DECADRON   This may have changed:  Another medication with the same name was removed. Continue taking this medication, and follow the directions you see here.   Used for:  Multiple myeloma not having achieved remission (H)        Take 20mg (5 tablets) by mouth every week.   Quantity:  28 tablet   Refills:  0                Primary Care Provider Office Phone # Fax #    Addy Frias [phone] [address] [address]        Equal Access to Services     [facility] Center[facility] AH: Hadii liane murcia hadasho Soyair, waaxda luqadaha, qaybta kaalmada adeeglatia, hung sadler. So [facility] [phone].    ATENCIÓN: Si habla español, tiene a barlow disposición servicios gratuitos de asistencia lingüística. Llame al [phone].    We comply with applicable federal civil rights laws and [place] laws. We do not discriminate on the basis of race, color, national origin, age, disability, sex, sexual orientation, or gender identity.            Thank you!     Thank you for choosing [facility] AND [facility] C[facility]  for your care. Our goal is always to provide you with excellent care. Hearing back " from our patients is one way we can continue to improve our services. Please take a few minutes to complete the written survey that you may receive in the mail after your visit with us. Thank you!             Your Updated Medication List - Protect others around you: Learn how to safely use, store and throw away your medicines at www.disposemymeds.org.          This list is accurate as of 12/5/18 10:06 AM.  Always use your most recent med list.                   Brand Name Dispense Instructions for use Diagnosis    ACYCLOVIR PO      Take 400 mg by mouth 2 times daily        BENADRYL PO      Take 25 mg by mouth nightly as needed        CALCIUM CITRATE + PO      Take 2,000 mg by mouth daily 2 tabs        carboxymethylcellulose 0.5 % Soln ophthalmic solution    REFRESH PLUS     1 drop 4 times daily        CLARINEX PO      Take by mouth daily Taking claritin        COMPAZINE PO      Take 10 mg by mouth daily as needed        cycloSPORINE 0.05 % ophthalmic emulsion    RESTASIS     Place 1 drop into both eyes every 12 hours        DAILY MULTIVITAMIN PO      Take 1 tablet by mouth daily.    Routine general medical examination at a health care facility       dexamethasone 4 MG tablet    DECADRON    28 tablet    Take 20mg (5 tablets) by mouth every week.    Multiple myeloma not having achieved remission (H)       lidocaine-prilocaine 2.5-2.5 % external cream    EMLA    30 g    Apply to port site 1 hour prior to access    Multiple myeloma not having achieved remission (H)       LORazepam 0.5 MG tablet    ATIVAN    30 tablet    Take 1 tablet (0.5 mg) by mouth every 8 hours as needed for anxiety    Multiple myeloma not having achieved remission (H)       metoprolol succinate ER 50 MG 24 hr tablet    TOPROL-XL    270 tablet    TAKE 2 TABLETS BY MOUTH EVERY MORNING, AND 1 TABLET EVERY EVENING    Paroxysmal atrial fibrillation (H)       oxyCODONE IR 15 MG tablet    ROXICODONE    90 tablet    Take 1 tablet (15 mg) by mouth every 8  hours as needed for pain maximum 4 tablet(s) per day    Multiple myeloma not having achieved remission (H)       polyethylene glycol powder    MIRALAX/GLYCOLAX     Take 1 capful by mouth daily as needed    Bilateral leg edema       REFRESH OP           SIMBRINZA 1-0.2 % ophthalmic suspension   Generic drug:  brinzolamide-brimonidine      Place 1 drop into the right eye 2 times daily 1 drop AM and PM        triamcinolone 0.5 % external cream    KENALOG    15 g    Apply sparingly to affected area three times daily. 1-2 weeks , as needed    Rash and nonspecific skin eruption       TYLENOL PO      Take 500 mg by mouth every 6 hours as needed for mild pain or fever        UNABLE TO FIND      MEDICATION NAME: Fresh Coat eye drops        VITAMIN D3 PO      Take 1,000 Units by mouth daily        warfarin 4 MG tablet    COUMADIN    110 tablet    TAKE 1-2 TABLETS BY MOUTH EVERY DAY AS DIRECTED BY INR CLINIC    Paroxysmal atrial fibrillation (H), Long-term (current) use of anticoagulants       ZOMETA IV      Inject into the vein every 30 days Every 3 month dosing

## 2018-12-07 ENCOUNTER — ANTICOAGULATION THERAPY VISIT (OUTPATIENT)
Dept: FAMILY MEDICINE | Facility: CLINIC | Age: 83
End: 2018-12-07
Payer: COMMERCIAL

## 2018-12-07 ENCOUNTER — TELEPHONE (OUTPATIENT)
Dept: FAMILY MEDICINE | Facility: CLINIC | Age: 83
End: 2018-12-07

## 2018-12-07 PROCEDURE — 99207 ZZC NO CHARGE NURSE ONLY: CPT | Performed by: INTERNAL MEDICINE

## 2018-12-07 NOTE — TELEPHONE ENCOUNTER
See open [facility] encounter    Regarding INR results from 12/5/18 (2.32, in range)    Tried calling patient to discuss warfarin/INR recheck, busy signal x2. Will need to recall     Aruna ROBERSON RN

## 2018-12-07 NOTE — MR AVS SNAPSHOT
Amira IVY Arreola   12/7/2018   Anticoagulation Therapy Visit    Description:  86 year old female   Provider:  Addy Frias MD   Department:  [facility]           INR as of 12/7/2018     Today's INR 2.32 (12/5/2018)      Anticoagulation Summary as of 12/7/2018     INR goal 2.0-3.0   Today's INR 2.32 (12/5/2018)   Full warfarin instructions 2 mg on Fri; 4 mg all other days   Next INR check 12/19/2018    Indications   Long-term (current) use of anticoagulants [Z79.01] [Z79.01]  Atrial fibrillation (H) [I48.91] (Resolved) [I48.91]         December 2018 Details    Sun Mon Tue Wed Thu Fri Sat           1                 2               3               4               5               6               7      2 mg   See details      8      4 mg           9      4 mg         10      4 mg         11      4 mg         12      4 mg         13      4 mg         14      2 mg         15      4 mg           16      4 mg         17      4 mg         18      4 mg         19            20               21               22                 23               24               25               26               27               28               29                 30               31                     Date Details   12/07 This INR check       Date of next INR:  12/19/2018         How to take your warfarin dose     To take:  2 mg Take 0.5 of a 4 mg tablet.    To take:  4 mg Take 1 of the 4 mg tablets.

## 2018-12-07 NOTE — TELEPHONE ENCOUNTER
Reached patient.  Continue 2 mg F and 4 mg ROW and recheck INR at oncology office on 12/19/18.  Tigist Corral RN

## 2018-12-07 NOTE — PROGRESS NOTES
ANTICOAGULATION FOLLOW-UP CLINIC VISIT    Patient Name:  Amira Arreola  Date:  12/7/2018  Contact Type:  Telephone    SUBJECTIVE:        OBJECTIVE    INR   Date Value Ref Range Status   12/05/2018 2.32 (H) 0.86 - 1.14 Final       ASSESSMENT / PLAN  INR assessment THER    Recheck INR In: 2 WEEKS    INR Location Outside lab      Anticoagulation Summary as of 12/7/2018     INR goal 2.0-3.0   Today's INR 2.32 (12/5/2018)   Warfarin maintenance plan 2 mg (4 mg x 0.5) on Fri; 4 mg (4 mg x 1) all other days   Full warfarin instructions 2 mg on Fri; 4 mg all other days   Weekly warfarin total 26 mg   No change documented Aruna Fajardo RN   Plan last modified Tigist Corral RN (10/25/2018)   Next INR check 12/19/2018   Target end date Indefinite    Indications   Long-term (current) use of anticoagulants [Z79.01] [Z79.01]  Atrial fibrillation (H) [I48.91] (Resolved) [I48.91]         Anticoagulation Episode Summary     INR check location     Preferred lab     Send INR reminders to [facility] ANTICOAGULATION    Comments  INR to be drawn at infusion visit OR home care nurse. Watch result notes. Patient can be reached at home phone [phone]. Do not leave dosing with spouse.   Pt advised to call for triage if not hearing back from us for dosing.      Anticoagulation Care Providers     Provider Role Specialty Phone number    Addy Frias MD [facility] Internal Medicine [phone]            See the Encounter Report to view Anticoagulation Flowsheet and Dosing Calendar (Go to Encounters tab in chart review, and find the Anticoagulation Therapy Visit)    Dosage adjustment made based on physician directed care plan. INR result from oncology office 2.32.  Reached patient. Continue 2 mg F, 4 mg ROW and recheck INR in 2 weeks.    Tigist Corral RN

## 2018-12-09 RX ORDER — SODIUM CHLORIDE 9 MG/ML
1000 INJECTION, SOLUTION INTRAVENOUS CONTINUOUS PRN
Status: CANCELLED
Start: 2018-12-19

## 2018-12-09 RX ORDER — HEPARIN SODIUM (PORCINE) LOCK FLUSH IV SOLN 100 UNIT/ML 100 UNIT/ML
5 SOLUTION INTRAVENOUS EVERY 8 HOURS
Status: CANCELLED
Start: 2019-01-02

## 2018-12-09 RX ORDER — ALBUTEROL SULFATE 90 UG/1
1-2 AEROSOL, METERED RESPIRATORY (INHALATION)
Status: CANCELLED
Start: 2018-12-19

## 2018-12-09 RX ORDER — HEPARIN SODIUM (PORCINE) LOCK FLUSH IV SOLN 100 UNIT/ML 100 UNIT/ML
5 SOLUTION INTRAVENOUS EVERY 8 HOURS
Status: CANCELLED
Start: 2018-12-19

## 2018-12-09 RX ORDER — ALBUTEROL SULFATE 90 UG/1
1-2 AEROSOL, METERED RESPIRATORY (INHALATION)
Status: CANCELLED
Start: 2019-01-02

## 2018-12-09 RX ORDER — ALBUTEROL SULFATE 0.83 MG/ML
2.5 SOLUTION RESPIRATORY (INHALATION)
Status: CANCELLED | OUTPATIENT
Start: 2018-12-19

## 2018-12-09 RX ORDER — EPINEPHRINE 0.3 MG/.3ML
0.3 INJECTION SUBCUTANEOUS EVERY 5 MIN PRN
Status: CANCELLED | OUTPATIENT
Start: 2019-01-02

## 2018-12-09 RX ORDER — ACETAMINOPHEN 325 MG/1
650 TABLET ORAL ONCE
Status: CANCELLED | OUTPATIENT
Start: 2018-12-19

## 2018-12-09 RX ORDER — EPINEPHRINE 1 MG/ML
0.3 INJECTION, SOLUTION INTRAMUSCULAR; SUBCUTANEOUS EVERY 5 MIN PRN
Status: CANCELLED | OUTPATIENT
Start: 2018-12-19

## 2018-12-09 RX ORDER — DIPHENHYDRAMINE HYDROCHLORIDE 50 MG/ML
50 INJECTION INTRAMUSCULAR; INTRAVENOUS
Status: CANCELLED
Start: 2019-01-02

## 2018-12-09 RX ORDER — EPINEPHRINE 1 MG/ML
0.3 INJECTION, SOLUTION INTRAMUSCULAR; SUBCUTANEOUS EVERY 5 MIN PRN
Status: CANCELLED | OUTPATIENT
Start: 2019-01-02

## 2018-12-09 RX ORDER — SODIUM CHLORIDE 9 MG/ML
1000 INJECTION, SOLUTION INTRAVENOUS CONTINUOUS PRN
Status: CANCELLED
Start: 2019-01-02

## 2018-12-09 RX ORDER — MEPERIDINE HYDROCHLORIDE 25 MG/ML
25 INJECTION INTRAMUSCULAR; INTRAVENOUS; SUBCUTANEOUS EVERY 30 MIN PRN
Status: CANCELLED | OUTPATIENT
Start: 2019-01-02

## 2018-12-09 RX ORDER — DIPHENHYDRAMINE HYDROCHLORIDE 50 MG/ML
50 INJECTION INTRAMUSCULAR; INTRAVENOUS
Status: CANCELLED
Start: 2018-12-19

## 2018-12-09 RX ORDER — LORAZEPAM 2 MG/ML
0.5 INJECTION INTRAMUSCULAR EVERY 4 HOURS PRN
Status: CANCELLED
Start: 2019-01-02

## 2018-12-09 RX ORDER — METHYLPREDNISOLONE SODIUM SUCCINATE 125 MG/2ML
125 INJECTION, POWDER, LYOPHILIZED, FOR SOLUTION INTRAMUSCULAR; INTRAVENOUS
Status: CANCELLED
Start: 2018-12-19

## 2018-12-09 RX ORDER — DIPHENHYDRAMINE HCL 25 MG
50 CAPSULE ORAL ONCE
Status: CANCELLED | OUTPATIENT
Start: 2018-12-19

## 2018-12-09 RX ORDER — METHYLPREDNISOLONE SODIUM SUCCINATE 125 MG/2ML
125 INJECTION, POWDER, LYOPHILIZED, FOR SOLUTION INTRAMUSCULAR; INTRAVENOUS
Status: CANCELLED
Start: 2019-01-02

## 2018-12-09 RX ORDER — MEPERIDINE HYDROCHLORIDE 25 MG/ML
25 INJECTION INTRAMUSCULAR; INTRAVENOUS; SUBCUTANEOUS EVERY 30 MIN PRN
Status: CANCELLED | OUTPATIENT
Start: 2018-12-19

## 2018-12-09 RX ORDER — LORAZEPAM 2 MG/ML
0.5 INJECTION INTRAMUSCULAR EVERY 4 HOURS PRN
Status: CANCELLED
Start: 2018-12-19

## 2018-12-09 RX ORDER — EPINEPHRINE 0.3 MG/.3ML
0.3 INJECTION SUBCUTANEOUS EVERY 5 MIN PRN
Status: CANCELLED | OUTPATIENT
Start: 2018-12-19

## 2018-12-09 RX ORDER — ALBUTEROL SULFATE 0.83 MG/ML
2.5 SOLUTION RESPIRATORY (INHALATION)
Status: CANCELLED | OUTPATIENT
Start: 2019-01-02

## 2018-12-11 DIAGNOSIS — C90.00 MULTIPLE MYELOMA NOT HAVING ACHIEVED REMISSION (H): Primary | ICD-10-CM

## 2018-12-11 RX ORDER — PROCHLORPERAZINE MALEATE 10 MG
10 TABLET ORAL EVERY 6 HOURS PRN
Qty: 30 TABLET | Refills: 1 | Status: SHIPPED | OUTPATIENT
Start: 2018-12-11 | End: 2019-12-11

## 2018-12-19 ENCOUNTER — INFUSION THERAPY VISIT (OUTPATIENT)
Dept: INFUSION THERAPY | Facility: CLINIC | Age: 83
End: 2018-12-19
Attending: INTERNAL MEDICINE
Payer: MEDICARE

## 2018-12-19 ENCOUNTER — ONCOLOGY VISIT (OUTPATIENT)
Dept: ONCOLOGY | Facility: CLINIC | Age: 83
End: 2018-12-19
Attending: INTERNAL MEDICINE
Payer: MEDICARE

## 2018-12-19 ENCOUNTER — HOSPITAL ENCOUNTER (OUTPATIENT)
Facility: CLINIC | Age: 83
Setting detail: SPECIMEN
Discharge: HOME | End: 2018-12-19
Attending: INTERNAL MEDICINE | Admitting: INTERNAL MEDICINE
Payer: MEDICARE

## 2018-12-19 VITALS
HEIGHT: 64 IN | OXYGEN SATURATION: 98 % | TEMPERATURE: 98.6 F | BODY MASS INDEX: 22.2 KG/M2 | WEIGHT: 130 LBS | HEART RATE: 73 BPM | RESPIRATION RATE: 16 BRPM | SYSTOLIC BLOOD PRESSURE: 143 MMHG | DIASTOLIC BLOOD PRESSURE: 87 MMHG

## 2018-12-19 VITALS
WEIGHT: 130 LBS | SYSTOLIC BLOOD PRESSURE: 143 MMHG | RESPIRATION RATE: 16 BRPM | TEMPERATURE: 98.6 F | DIASTOLIC BLOOD PRESSURE: 87 MMHG | HEART RATE: 73 BPM | BODY MASS INDEX: 22.3 KG/M2

## 2018-12-19 DIAGNOSIS — C90.00 MULTIPLE MYELOMA NOT HAVING ACHIEVED REMISSION (H): Primary | ICD-10-CM

## 2018-12-19 DIAGNOSIS — I48.0 PAROXYSMAL ATRIAL FIBRILLATION (H): ICD-10-CM

## 2018-12-19 DIAGNOSIS — R32 URINARY INCONTINENCE, UNSPECIFIED TYPE: ICD-10-CM

## 2018-12-19 LAB
ALBUMIN SERPL-MCNC: 3.7 G/DL (ref 3.4–5)
ALP SERPL-CCNC: 50 U/L (ref 40–150)
ALT SERPL W P-5'-P-CCNC: 22 U/L (ref 0–50)
AST SERPL W P-5'-P-CCNC: 21 U/L (ref 0–45)
BASOPHILS # BLD AUTO: 0 10E9/L (ref 0–0.2)
BASOPHILS NFR BLD AUTO: 0.1 %
BILIRUB DIRECT SERPL-MCNC: 0.2 MG/DL (ref 0–0.2)
BILIRUB SERPL-MCNC: 0.5 MG/DL (ref 0.2–1.3)
DIFFERENTIAL METHOD BLD: ABNORMAL
EOSINOPHIL # BLD AUTO: 0 10E9/L (ref 0–0.7)
EOSINOPHIL NFR BLD AUTO: 0.1 %
ERYTHROCYTE [DISTWIDTH] IN BLOOD BY AUTOMATED COUNT: 14.9 % (ref 10–15)
HCT VFR BLD AUTO: 35.6 % (ref 35–47)
HGB BLD-MCNC: 12 G/DL (ref 11.7–15.7)
IMM GRANULOCYTES # BLD: 0 10E9/L (ref 0–0.4)
IMM GRANULOCYTES NFR BLD: 0.3 %
INR PPP: 1.72 (ref 0.86–1.14)
LYMPHOCYTES # BLD AUTO: 0.5 10E9/L (ref 0.8–5.3)
LYMPHOCYTES NFR BLD AUTO: 6.4 %
MCH RBC QN AUTO: 32.7 PG (ref 26.5–33)
MCHC RBC AUTO-ENTMCNC: 33.7 G/DL (ref 31.5–36.5)
MCV RBC AUTO: 97 FL (ref 78–100)
MONOCYTES # BLD AUTO: 0.1 10E9/L (ref 0–1.3)
MONOCYTES NFR BLD AUTO: 1.4 %
NEUTROPHILS # BLD AUTO: 6.5 10E9/L (ref 1.6–8.3)
NEUTROPHILS NFR BLD AUTO: 91.7 %
NRBC # BLD AUTO: 0 10*3/UL
NRBC BLD AUTO-RTO: 0 /100
PLATELET # BLD AUTO: 156 10E9/L (ref 150–450)
PROT SERPL-MCNC: 6.4 G/DL (ref 6.8–8.8)
RBC # BLD AUTO: 3.67 10E12/L (ref 3.8–5.2)
WBC # BLD AUTO: 7.1 10E9/L (ref 4–11)

## 2018-12-19 PROCEDURE — 96375 TX/PRO/DX INJ NEW DRUG ADDON: CPT

## 2018-12-19 PROCEDURE — 25000128 H RX IP 250 OP 636: Mod: JW | Performed by: INTERNAL MEDICINE

## 2018-12-19 PROCEDURE — 96367 TX/PROPH/DG ADDL SEQ IV INF: CPT

## 2018-12-19 PROCEDURE — 83883 ASSAY NEPHELOMETRY NOT SPEC: CPT | Performed by: INTERNAL MEDICINE

## 2018-12-19 PROCEDURE — 25000132 ZZH RX MED GY IP 250 OP 250 PS 637: Mod: GY | Performed by: INTERNAL MEDICINE

## 2018-12-19 PROCEDURE — 85610 PROTHROMBIN TIME: CPT | Performed by: INTERNAL MEDICINE

## 2018-12-19 PROCEDURE — 96401 CHEMO ANTI-NEOPL SQ/IM: CPT

## 2018-12-19 PROCEDURE — G0463 HOSPITAL OUTPT CLINIC VISIT: HCPCS | Mod: 25

## 2018-12-19 PROCEDURE — 85025 COMPLETE CBC W/AUTO DIFF WBC: CPT | Performed by: INTERNAL MEDICINE

## 2018-12-19 PROCEDURE — 80076 HEPATIC FUNCTION PANEL: CPT | Performed by: INTERNAL MEDICINE

## 2018-12-19 PROCEDURE — A9270 NON-COVERED ITEM OR SERVICE: HCPCS | Mod: GY | Performed by: INTERNAL MEDICINE

## 2018-12-19 PROCEDURE — 00000402 ZZHCL STATISTIC TOTAL PROTEIN: Performed by: INTERNAL MEDICINE

## 2018-12-19 PROCEDURE — 96413 CHEMO IV INFUSION 1 HR: CPT

## 2018-12-19 PROCEDURE — 99214 OFFICE O/P EST MOD 30 MIN: CPT | Performed by: INTERNAL MEDICINE

## 2018-12-19 PROCEDURE — 96415 CHEMO IV INFUSION ADDL HR: CPT

## 2018-12-19 PROCEDURE — 84165 PROTEIN E-PHORESIS SERUM: CPT | Performed by: INTERNAL MEDICINE

## 2018-12-19 RX ORDER — HEPARIN SODIUM (PORCINE) LOCK FLUSH IV SOLN 100 UNIT/ML 100 UNIT/ML
5 SOLUTION INTRAVENOUS EVERY 8 HOURS
Status: DISCONTINUED | OUTPATIENT
Start: 2018-12-19 | End: 2018-12-19 | Stop reason: HOSPADM

## 2018-12-19 RX ORDER — DEXAMETHASONE 4 MG/1
TABLET ORAL
Qty: 28 TABLET | Refills: 0 | Status: SHIPPED | OUTPATIENT
Start: 2018-12-19 | End: 2019-03-13

## 2018-12-19 RX ORDER — ACETAMINOPHEN 325 MG/1
650 TABLET ORAL ONCE
Status: COMPLETED | OUTPATIENT
Start: 2018-12-19 | End: 2018-12-19

## 2018-12-19 RX ORDER — OXYCODONE HYDROCHLORIDE 15 MG/1
15 TABLET ORAL EVERY 8 HOURS PRN
Qty: 90 TABLET | Refills: 0 | Status: SHIPPED | OUTPATIENT
Start: 2018-12-19 | End: 2019-01-16

## 2018-12-19 RX ADMIN — ACETAMINOPHEN 650 MG: 325 TABLET ORAL at 12:05

## 2018-12-19 RX ADMIN — BORTEZOMIB 2.1 MG: 3.5 INJECTION, POWDER, LYOPHILIZED, FOR SOLUTION INTRAVENOUS; SUBCUTANEOUS at 13:59

## 2018-12-19 RX ADMIN — SODIUM CHLORIDE, PRESERVATIVE FREE 5 ML: 5 INJECTION INTRAVENOUS at 14:17

## 2018-12-19 RX ADMIN — SODIUM CHLORIDE 250 ML: 9 INJECTION, SOLUTION INTRAVENOUS at 12:04

## 2018-12-19 RX ADMIN — DIPHENHYDRAMINE HYDROCHLORIDE 50 MG: 50 INJECTION, SOLUTION INTRAMUSCULAR; INTRAVENOUS at 12:20

## 2018-12-19 RX ADMIN — DEXAMETHASONE SODIUM PHOSPHATE 12 MG: 10 INJECTION, SOLUTION INTRAMUSCULAR; INTRAVENOUS at 12:01

## 2018-12-19 RX ADMIN — DARATUMUMAB 1000 MG: 100 INJECTION, SOLUTION, CONCENTRATE INTRAVENOUS at 12:37

## 2018-12-19 ASSESSMENT — MIFFLIN-ST. JEOR: SCORE: 1015

## 2018-12-19 ASSESSMENT — PAIN SCALES - GENERAL
PAINLEVEL: MILD PAIN (2)
PAINLEVEL: MILD PAIN (2)

## 2018-12-19 NOTE — PROGRESS NOTES
"Oncology Rooming Note    December 19, 2018 10:26 AM   Amira Arreola is a 86 year old female who presents for:    Chief Complaint   Patient presents with     Oncology Clinic Visit     Multiple myeloma not having achieved remission (H)     Initial Vitals: /87   Pulse 73   Temp 98.6  F (37  C) (Oral)   Resp 16   Ht 1.626 m (5' 4.02\")   Wt 59 kg (130 lb)   SpO2 98%   BMI 22.30 kg/m   Estimated body mass index is 22.3 kg/m  as calculated from the following:    Height as of this encounter: 1.626 m (5' 4.02\").    Weight as of this encounter: 59 kg (130 lb). Body surface area is 1.63 meters squared.  Mild Pain (2) Comment: Data Unavailable   No LMP recorded. Patient is postmenopausal.  Allergies reviewed: Yes  Medications reviewed: Yes    Medications: MEDICATION REFILLS NEEDED TODAY. Provider was notified.  Pharmacy name entered into [employer]: [facility] [address] [place] [address] AT [facility] [address]    Clinical concerns: follow up     8 minutes for nursing intake (face to face time)     Mena Bardales CMA    DISCHARGE PLAN:  Next appointments: See patient instruction section  Departure Mode: Ambulatory  Accompanied by: son  0 minutes for nursing discharge (face to face time)   Mena Bardales CMA              "

## 2018-12-19 NOTE — Clinical Note
"    12/19/2018         RE: Amira Arreola  [address]  [place] [address]        Dear Colleague,    Thank you for referring your patient, Amira Arreola, to the [facility]. Please see a copy of my visit note below.    Oncology Rooming Note    December 19, 2018 10:26 AM   Amira Arreola is a 86 year old female who presents for:    Chief Complaint   Patient presents with     Oncology Clinic Visit     Multiple myeloma not having achieved remission (H)     Initial Vitals: /87   Pulse 73   Temp 98.6  F (37  C) (Oral)   Resp 16   Ht 1.626 m (5' 4.02\")   Wt 59 kg (130 lb)   SpO2 98%   BMI 22.30 kg/m    Estimated body mass index is 22.3 kg/m  as calculated from the following:    Height as of this encounter: 1.626 m (5' 4.02\").    Weight as of this encounter: 59 kg (130 lb). Body surface area is 1.63 meters squared.  Mild Pain (2) Comment: Data Unavailable   No LMP recorded. Patient is postmenopausal.  Allergies reviewed: Yes  Medications reviewed: Yes    Medications: MEDICATION REFILLS NEEDED TODAY. Provider was notified.  Pharmacy name entered into [employer]: [facility], [place] - [address] AT [facility] [address]    Clinical concerns: follow up     8 minutes for nursing intake (face to face time)     Mena Bardales CMA    DISCHARGE PLAN:  Next appointments: See patient instruction section  Departure Mode: Ambulatory  Accompanied by: son  0 minutes for nursing discharge (face to face time)   Mena Bardales CMA                Visit Date:   12/19/2018      SUBJECTIVE:  Ms. Arreola is an 86-year-old female with kappa free light chain multiple myeloma.  She is currently on daratumumab with Velcade and dexamethasone.  For convenience, we have changed her Velcade to every 2 weeks.  Overall, she has been tolerating it well.  She had responded.  Her kappa free light chain used to about 60.  It is down to about 3.  In last few months, it has increased " slightly, which could be due to the fact that Velcade is being given every 2 weeks.      Overall, her condition is stable.  She has chronic back pain, mainly in the upper back.  She takes oxycodone which helps.  She wants a refill on that.      The patient has fatigue.  That is due to multiple factors, including her age, myeloma and chemotherapy.  No worsening of fatigue.      Lately, she has been having some urinary frequency and also some urinary incontinence.  She was seen in [facility].  She has no UTI.      No headache.  No dizziness.  No chest pain.  No shortness of breath.  No nausea or vomiting.  Appetite fairly good.  No fever or chills.  No bowel problem.  No recent fall.      PHYSICAL EXAMINATION:  Unchanged.      LABORATORY DATA:  Reviewed.      ASSESSMENT:   1.  An 86-year-old female with kappa free light chain multiple myeloma, on Velcade, daratumumab, and dexamethasone.   2.  Urinary frequency and incontinence.   3.  Fatigue secondary to her age, multiple myeloma, chemotherapy.   4.  Chronic back pain.      PLAN:   1.  The patient is clinically stable from myeloma.  Labs were all reviewed.  I told her that her kappa free light chain is slowly increasing, but is still low.  At this time, will continue her on Velcade, daratumumab, and dexamethasone.  Overall, she has been tolerating it well.  She will get Velcade every other week for convenience.   2.  The patient gets Zometa every 3 months for bone health, which will be continued.  She has been tolerating it well.  She is not having any jaw-related symptoms or dental problem.   3.  She has chronic back pain.  Oxycodone prescription was refilled.   4.  She has urinary frequency and incontinence.  We will set up a urologic consult.   5.  The patient had a few questions, which were all answered.  Advised her to see a physician sooner if she has fever, chills, worsening weakness, shortness of breath, recurrent vomiting, bleeding or any other  concerns.  I will see her in a month.         ITZ LOPEZ MD             D: 2018   T: 2018   MT: BALBINA      Name:     ALBERTO PARSON   MRN:      [id number]        Account:      [id number]   :      1932           Visit Date:   2018      Document: [id number]       Again, thank you for allowing me to participate in the care of your patient.        Sincerely,        Itz Lopez MD

## 2018-12-19 NOTE — PROGRESS NOTES
Infusion Nursing Note:  Amira Arreola presents today for VELCADE, DARZALEX.    Patient seen by provider today: Yes: JESSICA   present during visit today: Not Applicable.    Note: N/A.    Intravenous Access:  Implanted Port.    Treatment Conditions:  Lab Results   Component Value Date    HGB 12.0 12/19/2018     Lab Results   Component Value Date    WBC 7.1 12/19/2018      Lab Results   Component Value Date    ANEU 6.5 12/19/2018     Lab Results   Component Value Date     12/19/2018      Lab Results   Component Value Date     09/20/2018                   Lab Results   Component Value Date    POTASSIUM 3.8 09/20/2018           No results found for: MAG         Lab Results   Component Value Date    CR 0.71 10/24/2018                   Lab Results   Component Value Date    BRADLEY 9.4 10/24/2018                Lab Results   Component Value Date    BILITOTAL 0.5 12/19/2018           Lab Results   Component Value Date    ALBUMIN 3.7 12/19/2018                    Lab Results   Component Value Date    ALT 22 12/19/2018           Lab Results   Component Value Date    AST 21 12/19/2018       Results reviewed, labs MET treatment parameters, ok to proceed with treatment.      Post Infusion Assessment:  Patient tolerated infusion without incident.  Patient tolerated injection without incident.  Site patent and intact, free from redness, edema or discomfort.  No evidence of extravasations.  Access discontinued per protocol.    Discharge Plan:   Patient and/or family verbalized understanding of discharge instructions and all questions answered.  Copy of AVS reviewed with patient and/or family.  Patient will return 1/4/19 for next appointment.  Patient discharged in stable condition accompanied by: self and son.  Departure Mode: Ambulatory.    Venice Gaspar RN

## 2018-12-20 LAB
ALBUMIN SERPL ELPH-MCNC: 4 G/DL (ref 3.7–5.1)
ALPHA1 GLOB SERPL ELPH-MCNC: 0.4 G/DL (ref 0.2–0.4)
ALPHA2 GLOB SERPL ELPH-MCNC: 0.8 G/DL (ref 0.5–0.9)
B-GLOBULIN SERPL ELPH-MCNC: 0.7 G/DL (ref 0.6–1)
GAMMA GLOB SERPL ELPH-MCNC: 0.2 G/DL (ref 0.7–1.6)
KAPPA LC UR-MCNC: 1.94 MG/DL (ref 0.33–1.94)
KAPPA LC/LAMBDA SER: 5.88 {RATIO} (ref 0.26–1.65)
LAMBDA LC SERPL-MCNC: 0.33 MG/DL (ref 0.57–2.63)
M PROTEIN SERPL ELPH-MCNC: 0 G/DL
PROT PATTERN SERPL ELPH-IMP: ABNORMAL

## 2018-12-21 NOTE — PROGRESS NOTES
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     1. On 09/21/2015, WBC of 4.2, hemoglobin of 13.2 and platelets of 138.    -On 09/29/2015, SPEP does not reveal any M-spike.   -On 10/02/2015, JANET does not reveal any monoclonal protein.     -On 10/22/2015, urine immunofixation reveals monoclonal free kappa light chain.    2. On 05/11/2016, kappa light chain of 50, lambda light chain of 0.32 and ratio of kappa to lambda of 156.2.  3. Bone marrow biopsy on 05/25/2016 reveals 40-50% kappa light chain restricted plasma cells.  Cytogenetics is normal. FISH panel reveals translocation 11;14.    4. MRI of bones on 06/21/2016 and 06/22/2016 reveals myeloma lesions.  5. On 08/24/2016, she was started on revlimid with dexamethasone 20 mg weekly. She did not have any significant response to treatment.   6. Velcade and dexamethasone started on 03/21/2017.    7. Daratumumab added to velcade and dexamethasone on 05/31/2017.   -Velcade given every 14 days starting 08/01/2018.     SUBJECTIVE:  Ms. Arreola is an 86-year-old female with kappa free light chain multiple myeloma.  She is currently on daratumumab with Velcade and dexamethasone.  For convenience, we have changed her Velcade to every 2 weeks.  Overall, she has been tolerating it well.  Myeloma has responded.  Her kappa free light chain used to about 60.  It is down to about 3.  In last few months, it has increased slightly, which could be due to the fact that Velcade is being given every 2 weeks.      Overall, her condition is stable.  She has chronic back pain, mainly in the upper back.  She takes oxycodone which helps.  She wants a refill on that.      Patient has fatigue.  That is due to multiple factors, including her age, myeloma and chemotherapy.  No worsening of fatigue.      Lately, she has been having some urinary frequency and also some urinary incontinence.  She was seen in  [facility]. No UTI.     No headache.  No dizziness.  No chest pain.  No shortness of breath.  No nausea or vomiting.  Appetite is fairly good.  No fever or chills.  No bowel problem.  No recent fall.      PHYSICAL EXAMINATION:   Alert and oriented x 3. ECOG PS of 2.  EYES:  No icterus.   THROAT:  No ulcer or thrush.   NECK:  Supple. No lymphadenopathy.   AXILLAE:  No lymphadenopathy.   LUNGS:  Good air entry bilaterally.  No crackles or wheezing.   HEART:  Regular.  No murmur.   ABDOMEN:  Soft and nontender.  No mass.   EXTREMITIES: Bilateral pedal edema. No calf swelling or tenderness.   SKIN: No petechia.     LABORATORY DATA:  Reviewed.      ASSESSMENT:   1.  An 86-year-old female with kappa free light chain multiple myeloma on Velcade, daratumumab, and dexamethasone.   2.  Urinary frequency and incontinence.   3.  Fatigue secondary to her age, multiple myeloma, chemotherapy.   4.  Chronic back pain.      PLAN:   1.  Patient is clinically stable from myeloma.  Labs were all reviewed.  I told her that her kappa free light chain is slowly increasing, but is still low.  At this time, will continue her on Velcade, daratumumab, and dexamethasone.  Overall, she has been tolerating it well.  She will get Velcade every other week for convenience.   2.  Patient gets Zometa every 3 months for bone health, which will be continued.  She has been tolerating it well.  She is not having any jaw-related symptoms or dental problem.   3.  She has chronic back pain.  Oxycodone prescription was refilled.   4.  She has urinary frequency and incontinence.  We will set up a urology consult.   5.  Patient had a few questions, which were all answered.  Advised her to see a physician sooner if she has fever, chills, worsening weakness, shortness of breath, recurrent vomiting, bleeding or any other concerns.  I will see her in a month.         ITZ LOPEZ MD             D: 12/19/2018   T: 12/21/2018   MT: BALBINA      Name:      ALBERTO PARSON   MRN:      [id number]        Account:      [id number]   :      1932           Visit Date:   2018      Document: [id number]

## 2018-12-22 DIAGNOSIS — I48.0 PAROXYSMAL ATRIAL FIBRILLATION (H): ICD-10-CM

## 2018-12-22 RX ORDER — EPINEPHRINE 1 MG/ML
0.3 INJECTION, SOLUTION INTRAMUSCULAR; SUBCUTANEOUS EVERY 5 MIN PRN
Status: CANCELLED | OUTPATIENT
Start: 2019-01-16

## 2018-12-22 RX ORDER — ALBUTEROL SULFATE 90 UG/1
1-2 AEROSOL, METERED RESPIRATORY (INHALATION)
Status: CANCELLED
Start: 2019-01-16

## 2018-12-22 RX ORDER — DIPHENHYDRAMINE HYDROCHLORIDE 50 MG/ML
50 INJECTION INTRAMUSCULAR; INTRAVENOUS
Status: CANCELLED
Start: 2019-01-16

## 2018-12-22 RX ORDER — MEPERIDINE HYDROCHLORIDE 25 MG/ML
25 INJECTION INTRAMUSCULAR; INTRAVENOUS; SUBCUTANEOUS EVERY 30 MIN PRN
Status: CANCELLED | OUTPATIENT
Start: 2019-01-30

## 2018-12-22 RX ORDER — METHYLPREDNISOLONE SODIUM SUCCINATE 125 MG/2ML
125 INJECTION, POWDER, LYOPHILIZED, FOR SOLUTION INTRAMUSCULAR; INTRAVENOUS
Status: CANCELLED
Start: 2019-01-30

## 2018-12-22 RX ORDER — DIPHENHYDRAMINE HYDROCHLORIDE 50 MG/ML
50 INJECTION INTRAMUSCULAR; INTRAVENOUS
Status: CANCELLED
Start: 2019-01-30

## 2018-12-22 RX ORDER — EPINEPHRINE 0.3 MG/.3ML
0.3 INJECTION SUBCUTANEOUS EVERY 5 MIN PRN
Status: CANCELLED | OUTPATIENT
Start: 2019-01-30

## 2018-12-22 RX ORDER — HEPARIN SODIUM (PORCINE) LOCK FLUSH IV SOLN 100 UNIT/ML 100 UNIT/ML
5 SOLUTION INTRAVENOUS EVERY 8 HOURS
Status: CANCELLED
Start: 2019-01-30

## 2018-12-22 RX ORDER — SODIUM CHLORIDE 9 MG/ML
1000 INJECTION, SOLUTION INTRAVENOUS CONTINUOUS PRN
Status: CANCELLED
Start: 2019-01-16

## 2018-12-22 RX ORDER — MEPERIDINE HYDROCHLORIDE 25 MG/ML
25 INJECTION INTRAMUSCULAR; INTRAVENOUS; SUBCUTANEOUS EVERY 30 MIN PRN
Status: CANCELLED | OUTPATIENT
Start: 2019-01-16

## 2018-12-22 RX ORDER — LORAZEPAM 2 MG/ML
0.5 INJECTION INTRAMUSCULAR EVERY 4 HOURS PRN
Status: CANCELLED
Start: 2019-01-16

## 2018-12-22 RX ORDER — ACETAMINOPHEN 325 MG/1
650 TABLET ORAL ONCE
Status: CANCELLED | OUTPATIENT
Start: 2019-01-16

## 2018-12-22 RX ORDER — EPINEPHRINE 0.3 MG/.3ML
0.3 INJECTION SUBCUTANEOUS EVERY 5 MIN PRN
Status: CANCELLED | OUTPATIENT
Start: 2019-01-16

## 2018-12-22 RX ORDER — ALBUTEROL SULFATE 90 UG/1
1-2 AEROSOL, METERED RESPIRATORY (INHALATION)
Status: CANCELLED
Start: 2019-01-30

## 2018-12-22 RX ORDER — ALBUTEROL SULFATE 0.83 MG/ML
2.5 SOLUTION RESPIRATORY (INHALATION)
Status: CANCELLED | OUTPATIENT
Start: 2019-01-30

## 2018-12-22 RX ORDER — SODIUM CHLORIDE 9 MG/ML
1000 INJECTION, SOLUTION INTRAVENOUS CONTINUOUS PRN
Status: CANCELLED
Start: 2019-01-30

## 2018-12-22 RX ORDER — METHYLPREDNISOLONE SODIUM SUCCINATE 125 MG/2ML
125 INJECTION, POWDER, LYOPHILIZED, FOR SOLUTION INTRAMUSCULAR; INTRAVENOUS
Status: CANCELLED
Start: 2019-01-16

## 2018-12-22 RX ORDER — HEPARIN SODIUM (PORCINE) LOCK FLUSH IV SOLN 100 UNIT/ML 100 UNIT/ML
5 SOLUTION INTRAVENOUS EVERY 8 HOURS
Status: CANCELLED
Start: 2019-01-16

## 2018-12-22 RX ORDER — ALBUTEROL SULFATE 0.83 MG/ML
2.5 SOLUTION RESPIRATORY (INHALATION)
Status: CANCELLED | OUTPATIENT
Start: 2019-01-16

## 2018-12-22 RX ORDER — EPINEPHRINE 1 MG/ML
0.3 INJECTION, SOLUTION INTRAMUSCULAR; SUBCUTANEOUS EVERY 5 MIN PRN
Status: CANCELLED | OUTPATIENT
Start: 2019-01-30

## 2018-12-22 RX ORDER — DIPHENHYDRAMINE HCL 25 MG
50 CAPSULE ORAL ONCE
Status: CANCELLED | OUTPATIENT
Start: 2019-01-16

## 2018-12-22 RX ORDER — LORAZEPAM 2 MG/ML
0.5 INJECTION INTRAMUSCULAR EVERY 4 HOURS PRN
Status: CANCELLED
Start: 2019-01-30

## 2018-12-22 NOTE — TELEPHONE ENCOUNTER
"Last Written Prescription Date:  9/18/18  Last Fill Quantity: 270 tablet,  # refills: 0   Last office visit: 9/20/2018 with prescribing provider:  Altagracia   Future Office Visit:   Next 5 appointments (look out 90 days)    Jan 02, 2019  2:40 PM CST  Return Visit with Shayne Roberts MD  [facility] ([facility]) [facility]  [address]  [facility] [address]  [phone]   Jan 16, 2019  9:40 AM CST  Return Visit with Shayne Roberts MD  [facility] ([facility]) [facility]  [address]  [facility] [address]  [phone]         Requested Prescriptions   Pending Prescriptions Disp Refills     metoprolol succinate ER (TOPROL-XL) 50 MG 24 hr tablet [Pharmacy Med Name: METOPROLOL ER SUCCINATE 50MG TABS] 270 tablet 0     Sig: TAKE 2 TABLETS BY MOUTH EVERY MORNING, AND 1 TABLET EVERY EVENING    Beta-Blockers Protocol Failed - 12/22/2018  3:55 AM       Failed - Blood pressure under 140/90 in past 12 months    BP Readings from Last 3 Encounters:   12/19/18 143/87   12/19/18 143/87   12/05/18 140/86                Passed - Patient is age 6 or older       Passed - Recent (12 mo) or future (30 days) visit within the authorizing provider's specialty    Patient had office visit in the last 12 months or has a visit in the next 30 days with authorizing provider or within the authorizing provider's specialty.  See \"Patient Info\" tab in inbasket, or \"Choose Columns\" in Meds & Orders section of the refill encounter.                "

## 2018-12-26 RX ORDER — METOPROLOL SUCCINATE 50 MG/1
TABLET, EXTENDED RELEASE ORAL
Qty: 270 TABLET | Refills: 0 | Status: SHIPPED | OUTPATIENT
Start: 2018-12-26 | End: 2019-03-01

## 2018-12-26 NOTE — TELEPHONE ENCOUNTER
Routing refill request to provider for review/approval because:  BP out of range.  Please authorize if appropriate.  Thanks,  Bee Fox RN

## 2019-01-02 ENCOUNTER — INFUSION THERAPY VISIT (OUTPATIENT)
Dept: INFUSION THERAPY | Facility: CLINIC | Age: 84
End: 2019-01-02
Attending: INTERNAL MEDICINE
Payer: MEDICARE

## 2019-01-02 ENCOUNTER — HOSPITAL ENCOUNTER (OUTPATIENT)
Facility: CLINIC | Age: 84
Setting detail: SPECIMEN
Discharge: HOME | End: 2019-01-02
Attending: INTERNAL MEDICINE | Admitting: INTERNAL MEDICINE
Payer: MEDICARE

## 2019-01-02 ENCOUNTER — ONCOLOGY VISIT (OUTPATIENT)
Dept: ONCOLOGY | Facility: CLINIC | Age: 84
End: 2019-01-02
Attending: INTERNAL MEDICINE
Payer: MEDICARE

## 2019-01-02 ENCOUNTER — ANTICOAGULATION THERAPY VISIT (OUTPATIENT)
Dept: FAMILY MEDICINE | Facility: CLINIC | Age: 84
End: 2019-01-02

## 2019-01-02 VITALS
HEART RATE: 80 BPM | RESPIRATION RATE: 16 BRPM | BODY MASS INDEX: 22.95 KG/M2 | DIASTOLIC BLOOD PRESSURE: 84 MMHG | OXYGEN SATURATION: 97 % | TEMPERATURE: 97.9 F | SYSTOLIC BLOOD PRESSURE: 151 MMHG | WEIGHT: 133.8 LBS

## 2019-01-02 VITALS
RESPIRATION RATE: 20 BRPM | BODY MASS INDEX: 22.71 KG/M2 | HEART RATE: 75 BPM | HEIGHT: 64 IN | TEMPERATURE: 97.9 F | OXYGEN SATURATION: 97 % | WEIGHT: 133 LBS | SYSTOLIC BLOOD PRESSURE: 169 MMHG | DIASTOLIC BLOOD PRESSURE: 95 MMHG

## 2019-01-02 DIAGNOSIS — I48.91 ATRIAL FIBRILLATION (H): ICD-10-CM

## 2019-01-02 DIAGNOSIS — C90.00 MULTIPLE MYELOMA NOT HAVING ACHIEVED REMISSION (H): Primary | ICD-10-CM

## 2019-01-02 LAB
BASOPHILS # BLD AUTO: 0 10E9/L (ref 0–0.2)
BASOPHILS NFR BLD AUTO: 0 %
DIFFERENTIAL METHOD BLD: ABNORMAL
EOSINOPHIL # BLD AUTO: 0 10E9/L (ref 0–0.7)
EOSINOPHIL NFR BLD AUTO: 0 %
ERYTHROCYTE [DISTWIDTH] IN BLOOD BY AUTOMATED COUNT: 14.4 % (ref 10–15)
HCT VFR BLD AUTO: 36.3 % (ref 35–47)
HGB BLD-MCNC: 12 G/DL (ref 11.7–15.7)
IMM GRANULOCYTES # BLD: 0 10E9/L (ref 0–0.4)
IMM GRANULOCYTES NFR BLD: 0.2 %
INR PPP: 2.56 (ref 0.86–1.14)
LYMPHOCYTES # BLD AUTO: 0.3 10E9/L (ref 0.8–5.3)
LYMPHOCYTES NFR BLD AUTO: 4.8 %
MCH RBC QN AUTO: 32.1 PG (ref 26.5–33)
MCHC RBC AUTO-ENTMCNC: 33.1 G/DL (ref 31.5–36.5)
MCV RBC AUTO: 97 FL (ref 78–100)
MONOCYTES # BLD AUTO: 0.1 10E9/L (ref 0–1.3)
MONOCYTES NFR BLD AUTO: 1 %
NEUTROPHILS # BLD AUTO: 5.7 10E9/L (ref 1.6–8.3)
NEUTROPHILS NFR BLD AUTO: 94 %
NRBC # BLD AUTO: 0 10*3/UL
NRBC BLD AUTO-RTO: 0 /100
PLATELET # BLD AUTO: 152 10E9/L (ref 150–450)
RBC # BLD AUTO: 3.74 10E12/L (ref 3.8–5.2)
WBC # BLD AUTO: 6.2 10E9/L (ref 4–11)

## 2019-01-02 PROCEDURE — 25000128 H RX IP 250 OP 636: Performed by: INTERNAL MEDICINE

## 2019-01-02 PROCEDURE — 99207 ZZC NO CHARGE NURSE ONLY: CPT | Performed by: INTERNAL MEDICINE

## 2019-01-02 PROCEDURE — 85025 COMPLETE CBC W/AUTO DIFF WBC: CPT | Performed by: INTERNAL MEDICINE

## 2019-01-02 PROCEDURE — 85610 PROTHROMBIN TIME: CPT | Performed by: INTERNAL MEDICINE

## 2019-01-02 PROCEDURE — 96401 CHEMO ANTI-NEOPL SQ/IM: CPT

## 2019-01-02 PROCEDURE — 99214 OFFICE O/P EST MOD 30 MIN: CPT | Performed by: INTERNAL MEDICINE

## 2019-01-02 RX ORDER — HEPARIN SODIUM (PORCINE) LOCK FLUSH IV SOLN 100 UNIT/ML 100 UNIT/ML
5 SOLUTION INTRAVENOUS EVERY 8 HOURS
Status: DISCONTINUED | OUTPATIENT
Start: 2019-01-02 | End: 2019-01-02 | Stop reason: HOSPADM

## 2019-01-02 RX ADMIN — SODIUM CHLORIDE, PRESERVATIVE FREE 5 ML: 5 INJECTION INTRAVENOUS at 15:26

## 2019-01-02 RX ADMIN — BORTEZOMIB 2.1 MG: 3.5 INJECTION, POWDER, LYOPHILIZED, FOR SOLUTION INTRAVENOUS; SUBCUTANEOUS at 15:23

## 2019-01-02 ASSESSMENT — MIFFLIN-ST. JEOR: SCORE: 1028.28

## 2019-01-02 ASSESSMENT — PAIN SCALES - GENERAL: PAINLEVEL: SEVERE PAIN (6)

## 2019-01-02 NOTE — PROGRESS NOTES
"Oncology Rooming Note    January 2, 2019 2:04 PM   Amira Arreola is a 86 year old female who presents for:    Chief Complaint   Patient presents with     Oncology Clinic Visit     Multiple myeloma not having achieved remission (H)     Initial Vitals: BP (!) 169/95   Pulse 75   Temp 97.9  F (36.6  C) (Oral)   Resp 20   Ht 1.626 m (5' 4\")   Wt 60.3 kg (133 lb)   SpO2 97%   BMI 22.83 kg/m   Estimated body mass index is 22.83 kg/m  as calculated from the following:    Height as of this encounter: 1.626 m (5' 4\").    Weight as of this encounter: 60.3 kg (133 lb). Body surface area is 1.65 meters squared.  Severe Pain (6) Comment: Data Unavailable   No LMP recorded. Patient is postmenopausal.  Allergies reviewed: Yes  Medications reviewed: Yes    Medications: Medication refills not needed today.  Pharmacy name entered into [employer]: [facility] [address] [place] [address] 7 AT [facility] [address]    Clinical concerns: Urine urgency    8 minutes for nursing intake (face to face time)     Mena Bardales CMA              "

## 2019-01-02 NOTE — PATIENT INSTRUCTIONS
Continue chemotherapy.  Schedule urology consult. Try to schedule it on the day she is here for chemotherapy. Scheduled/ Tia  See Todd Loya or physician in 2 weeks for treatment. Scheduled/ Tia  Follow up with me in 1 month for treatment. Scheduled/Tia  Recheck BP. Scheduled/Tia      [place] infusion    Printed off avs, given to patient

## 2019-01-02 NOTE — Clinical Note
"    1/2/2019         RE: Amira Arreola  [address]  [place] [address]        Dear Colleague,    Thank you for referring your patient, Amira Arreola, to the [facility]. Please see a copy of my visit note below.    Oncology Rooming Note    January 2, 2019 2:04 PM   Amira Arreola is a 86 year old female who presents for:    Chief Complaint   Patient presents with     Oncology Clinic Visit     Multiple myeloma not having achieved remission (H)     Initial Vitals: BP (!) 169/95   Pulse 75   Temp 97.9  F (36.6  C) (Oral)   Resp 20   Ht 1.626 m (5' 4\")   Wt 60.3 kg (133 lb)   SpO2 97%   BMI 22.83 kg/m    Estimated body mass index is 22.83 kg/m  as calculated from the following:    Height as of this encounter: 1.626 m (5' 4\").    Weight as of this encounter: 60.3 kg (133 lb). Body surface area is 1.65 meters squared.  Severe Pain (6) Comment: Data Unavailable   No LMP recorded. Patient is postmenopausal.  Allergies reviewed: Yes  Medications reviewed: Yes    Medications: Medication refills not needed today.  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     1. On 09/21/2015, WBC of 4.2, hemoglobin of 13.2 and platelets of 138.    -On 09/29/2015, SPEP does not reveal any M-spike.   -On 10/02/2015, JANET does not reveal any monoclonal protein.     -On 10/22/2015, urine immunofixation reveals monoclonal free kappa light chain.    2. On 05/11/2016, kappa light chain of 50, lambda light chain of 0.32 and ratio of kappa to lambda of 156.2.  3. Bone marrow biopsy on 05/25/2016 reveals 40-50% kappa light chain restricted plasma cells.  Cytogenetics is " normal. FISH panel reveals translocation 11;14.    4. MRI of bones on 06/21/2016 and 06/22/2016 reveals myeloma lesions.  5. On 08/24/2016, she was started on revlimid with dexamethasone 20 mg weekly. She did not have any significant response to treatment.   6. Velcade and dexamethasone started on 03/21/2017.    7. Daratumumab added to velcade and dexamethasone on 05/31/2017.   -Velcade given every 14 days starting 08/01/2018.     SUBJECTIVE:  Mrs. Arreola is an 86-year-old female with kappa free light chain multiple myeloma.  She is on Velcade and daratumumab.  For convenience we are giving her Velcade every 2 weeks.  Her disease has responded.  [place] free light chain is under 2.  It used to be around 60.      Overall, her condition is stable.  Main problem is fatigue. Patient says that she gets tired easily.      No headache.  Some dizziness.  No chest pain or difficulty breathing.  No abdominal pain, nausea or vomiting.  She has urinary incontinence and is going to see urologist.  No diarrhea.  No bleeding.  She has chronic upper back pain.  She is on oxycodone which helps.      PHYSICAL EXAMINATION:   Alert and oriented x 3. ECOG PS of 2.  EYES:  No icterus.   THROAT:  No ulcer or thrush.   NECK:  Supple. No lymphadenopathy.   AXILLAE:  No lymphadenopathy.   LUNGS:  Good air entry bilaterally.  No crackles or wheezing.   HEART:  Regular.  No murmur.   ABDOMEN:  Soft and nontender.  No mass.   EXTREMITIES: Bilateral pedal edema. No calf swelling or tenderness.   SKIN: No petechia.     LABORATORY DATA:  Reviewed.      ASSESSMENT:   1.  An 86-year-old female with kappa free light chain multiple myeloma on Velcade, daratumumab and dexamethasone.   2.  Urinary incontinence.   3.  Fatigue due to her age, myeloma and chemotherapy.   4.  Chronic back pain.      PLAN:   1.  Patient overall is stable from myeloma.  Labs were all reviewed.  She was happy to know that kappa free light chain continues to be low. She will  continue on Velcade, daratumumab and dexamethasone.  She is tolerating it well.  Side effects reviewed.      2.  For bone health, she will continue on Zometa every 3 months.      3.  For pain, she will continue on oxycodone.      4.  I will see her in a month for followup.  Patient advised to see our nurse practitioner in between.  Advised her to go to emergency room if she has fever, chills, infection, worsening weakness or any other concerns.         ITZ LOPEZ MD             D: 2019   T: 2019   MT: ROXANA      Name:     ALBERTO PARSON   MRN:      [id number]        Account:      [id number]   :      1932           Visit Date:   2019      Document: [id number]        Again, thank you for allowing me to participate in the care of your patient.        Sincerely,        Itz Lopez MD

## 2019-01-02 NOTE — PROGRESS NOTES
ANTICOAGULATION FOLLOW-UP CLINIC VISIT    Patient Name:  Amira Arreola  Date:  2019  Contact Type:  Telephone    SUBJECTIVE:     Patient Findings     Positives:   No Problem Findings           OBJECTIVE    INR   Date Value Ref Range Status   2019 2.56 (H) 0.86 - 1.14 Final       ASSESSMENT / PLAN  INR assessment THER    Recheck INR In: 2 WEEKS    INR Location Outside lab      Anticoagulation Summary  As of 2019    INR goal:   2.0-3.0   TTR:   68.9 % (2.5 y)   INR used for dosin.56 (2019)   Warfarin maintenance plan:   2 mg (4 mg x 0.5) every Fri; 4 mg (4 mg x 1) all other days   Full warfarin instructions:   2 mg every Fri; 4 mg all other days   Weekly warfarin total:   26 mg   No change documented:   Tigist Corral, RN   Plan last modified:   Tigist Corral RN (10/25/2018)   Next INR check:   2019   Target end date:   Indefinite    Indications    Long-term (current) use of anticoagulants [Z79.01] [Z79.01]  Atrial fibrillation (H) [I48.91] (Resolved) [I48.91]             Anticoagulation Episode Summary     INR check location:       Preferred lab:       Send INR reminders to:   CS ANTICOAGULATION    Comments:    INR to be drawn at infusion visit OR home care nurse. Watch result notes. Patient can be reached at home phone [phone]. Do not leave dosing with spouse.   Pt advised to call for triage if not hearing back from us for dosing.      Anticoagulation Care Providers     Provider Role Specialty Phone number    Addy Frias MD [facility] Internal Medicine [phone]            See the Encounter Report to view Anticoagulation Flowsheet and Dosing Calendar (Go to Encounters tab in chart review, and find the Anticoagulation Therapy Visit)    Dosage adjustment made based on physician directed care plan.  INR 2.56 per result note from oncology (found in Epic).  Called patient. Not home; will call later per spouse request.    Reached patient. Continue same dosing and  recheck INR at oncology in 2 weeks.    Tigist Corral RN

## 2019-01-02 NOTE — PROGRESS NOTES
Infusion Nursing Note:  Amira Arreola presents today for C21D15 velcade.    Patient seen by provider today: Yes: Dr. Roberts   present during visit today: Not Applicable.    Note: N/A.    Intravenous Access:  Labs drawn without difficulty.  Implanted Port.    Treatment Conditions:  Lab Results   Component Value Date    HGB 12.0 01/02/2019     Lab Results   Component Value Date    WBC 6.2 01/02/2019      Lab Results   Component Value Date    ANEU 5.7 01/02/2019     Lab Results   Component Value Date     01/02/2019      Lab Results   Component Value Date     09/20/2018                   Lab Results   Component Value Date    POTASSIUM 3.8 09/20/2018           No results found for: MAG         Lab Results   Component Value Date    CR 0.71 10/24/2018                   Lab Results   Component Value Date    BRADLEY 9.4 10/24/2018                Lab Results   Component Value Date    BILITOTAL 0.5 12/19/2018           Lab Results   Component Value Date    ALBUMIN 3.7 12/19/2018                    Lab Results   Component Value Date    ALT 22 12/19/2018           Lab Results   Component Value Date    AST 21 12/19/2018       Results reviewed, labs MET treatment parameters, ok to proceed with treatment.      Post Infusion Assessment:  Patient tolerated injection without incident.  Site patent and intact, free from redness, edema or discomfort.  No evidence of extravasations.    Discharge Plan:   Copy of AVS reviewed with patient and/or family.  Patient will return 1/16 for next appointment.  Patient discharged in stable condition accompanied by: self.  Departure Mode: Ambulatory.    Jamari Gamboa RN

## 2019-01-03 NOTE — PROGRESS NOTES
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     1. On 09/21/2015, WBC of 4.2, hemoglobin of 13.2 and platelets of 138.    -On 09/29/2015, SPEP does not reveal any M-spike.   -On 10/02/2015, JANET does not reveal any monoclonal protein.     -On 10/22/2015, urine immunofixation reveals monoclonal free kappa light chain.    2. On 05/11/2016, kappa light chain of 50, lambda light chain of 0.32 and ratio of kappa to lambda of 156.2.  3. Bone marrow biopsy on 05/25/2016 reveals 40-50% kappa light chain restricted plasma cells.  Cytogenetics is normal. FISH panel reveals translocation 11;14.    4. MRI of bones on 06/21/2016 and 06/22/2016 reveals myeloma lesions.  5. On 08/24/2016, she was started on revlimid with dexamethasone 20 mg weekly. She did not have any significant response to treatment.   6. Velcade and dexamethasone started on 03/21/2017.    7. Daratumumab added to velcade and dexamethasone on 05/31/2017.   -Velcade given every 14 days starting 08/01/2018.     SUBJECTIVE:  Mrs. Arreola is an 86-year-old female with kappa free light chain multiple myeloma.  She is on Velcade and daratumumab.  For convenience we are giving her Velcade every 2 weeks.  Her disease has responded.  [place] free light chain is under 2.  It used to be around 60.      Overall, her condition is stable.  Main problem is fatigue. Patient says that she gets tired easily.      No headache.  Some dizziness.  No chest pain or difficulty breathing.  No abdominal pain, nausea or vomiting.  She has urinary incontinence and is going to see urologist.  No diarrhea.  No bleeding.  She has chronic upper back pain.  She is on oxycodone which helps.      PHYSICAL EXAMINATION:   Alert and oriented x 3. ECOG PS of 2.  EYES:  No icterus.   THROAT:  No ulcer or thrush.   NECK:  Supple. No lymphadenopathy.   AXILLAE:  No lymphadenopathy.   LUNGS:  Good air entry bilaterally.   No crackles or wheezing.   HEART:  Regular.  No murmur.   ABDOMEN:  Soft and nontender.  No mass.   EXTREMITIES: Bilateral pedal edema. No calf swelling or tenderness.   SKIN: No petechia.     LABORATORY DATA:  Reviewed.      ASSESSMENT:   1.  An 86-year-old female with kappa free light chain multiple myeloma on Velcade, daratumumab and dexamethasone.   2.  Urinary incontinence.   3.  Fatigue due to her age, myeloma and chemotherapy.   4.  Chronic back pain.      PLAN:   1.  Patient overall is stable from myeloma.  Labs were all reviewed.  She was happy to know that kappa free light chain continues to be low. She will continue on Velcade, daratumumab and dexamethasone.  She is tolerating it well.  Side effects reviewed.      2.  For bone health, she will continue on Zometa every 3 months.      3.  For pain, she will continue on oxycodone.      4.  I will see her in a month for followup.  Patient advised to see our nurse practitioner in between.  Advised her to go to emergency room if she has fever, chills, infection, worsening weakness or any other concerns.         ITZ LOPEZ MD             D: 2019   T: 2019   MT: ROXANA      Name:     ALBERTO PARSON   MRN:      [id number]        Account:      [id number]   :      1932           Visit Date:   2019      Document: [id number]

## 2019-01-09 DIAGNOSIS — I48.0 PAROXYSMAL ATRIAL FIBRILLATION (H): ICD-10-CM

## 2019-01-09 DIAGNOSIS — Z79.01 LONG TERM CURRENT USE OF ANTICOAGULANT THERAPY: ICD-10-CM

## 2019-01-09 RX ORDER — WARFARIN SODIUM 4 MG/1
TABLET ORAL
Qty: 180 TABLET | Refills: 0 | Status: SHIPPED | OUTPATIENT
Start: 2019-01-09 | End: 2019-03-22

## 2019-01-09 NOTE — TELEPHONE ENCOUNTER
Prescription approved per [facility] Refill Protocol.      Bri VIZCARRA RN,BSN

## 2019-01-09 NOTE — TELEPHONE ENCOUNTER
"warfarin (COUMADIN) 4 MG tablet  Last Written Prescription Date:  9/11/18  Last Fill Quantity: 110,  # refills: 0   Last office visit: 11/2/2018 with prescribing provider:  TRINO   Future Office Visit:   Next 5 appointments (look out 90 days)    Jan 16, 2019  9:40 AM CST  Return Visit with Shayne Roberts MD  [facility] ([facility]) [facility]  [address]  [facility] [address]  [phone]   Jan 30, 2019  9:00 AM CST  Return Visit with Shayne Roberts MD  [facility] ([facility]) [facility]  [address]  [facility] [address]  [phone]                 Requested Prescriptions   Pending Prescriptions Disp Refills     warfarin (COUMADIN) 4 MG tablet [Pharmacy Med Name: WARFARIN SOD 4MG TABLETS (BLUE)] 110 tablet 0     Sig: TAKE 1-2 TABLETS BY MOUTH EVERY DAY AS DIRECTED BY INR CLINIC    Vitamin K Antagonists Failed - 1/9/2019  3:55 AM       Failed - INR is within goal in the past 6 weeks    Confirm INR is within goal in the past 6 weeks.     Recent Labs   Lab Test 01/02/19  1350   INR 2.56*                      Passed - Recent (12 mo) or future (30 days) visit within the authorizing provider's specialty    Patient had office visit in the last 12 months or has a visit in the next 30 days with authorizing provider or within the authorizing provider's specialty.  See \"Patient Info\" tab in inbasket, or \"Choose Columns\" in Meds & Orders section of the refill encounter.             Passed - Medication is active on med list       Passed - Patient is 18 years of age or older       Passed - Patient is not pregnant       Passed - No positive pregnancy on file in past 12 months          "

## 2019-01-16 ENCOUNTER — INFUSION THERAPY VISIT (OUTPATIENT)
Dept: INFUSION THERAPY | Facility: CLINIC | Age: 84
End: 2019-01-16
Attending: INTERNAL MEDICINE
Payer: MEDICARE

## 2019-01-16 ENCOUNTER — HOSPITAL ENCOUNTER (OUTPATIENT)
Facility: CLINIC | Age: 84
Setting detail: SPECIMEN
Discharge: HOME | End: 2019-01-16
Attending: INTERNAL MEDICINE | Admitting: INTERNAL MEDICINE
Payer: MEDICARE

## 2019-01-16 ENCOUNTER — ONCOLOGY VISIT (OUTPATIENT)
Dept: ONCOLOGY | Facility: CLINIC | Age: 84
End: 2019-01-16
Attending: INTERNAL MEDICINE
Payer: MEDICARE

## 2019-01-16 VITALS
DIASTOLIC BLOOD PRESSURE: 76 MMHG | OXYGEN SATURATION: 99 % | RESPIRATION RATE: 16 BRPM | TEMPERATURE: 97.9 F | SYSTOLIC BLOOD PRESSURE: 128 MMHG | WEIGHT: 133 LBS | HEIGHT: 64 IN | HEART RATE: 65 BPM | BODY MASS INDEX: 22.71 KG/M2

## 2019-01-16 VITALS
OXYGEN SATURATION: 99 % | DIASTOLIC BLOOD PRESSURE: 79 MMHG | TEMPERATURE: 97.9 F | HEART RATE: 58 BPM | SYSTOLIC BLOOD PRESSURE: 121 MMHG | BODY MASS INDEX: 22.71 KG/M2 | WEIGHT: 133 LBS | HEIGHT: 64 IN

## 2019-01-16 DIAGNOSIS — I48.0 PAROXYSMAL ATRIAL FIBRILLATION (H): ICD-10-CM

## 2019-01-16 DIAGNOSIS — C79.51 CANCER, METASTATIC TO BONE (H): ICD-10-CM

## 2019-01-16 DIAGNOSIS — C90.00 MULTIPLE MYELOMA NOT HAVING ACHIEVED REMISSION (H): ICD-10-CM

## 2019-01-16 DIAGNOSIS — C90.00 MULTIPLE MYELOMA NOT HAVING ACHIEVED REMISSION (H): Primary | ICD-10-CM

## 2019-01-16 LAB
ALBUMIN SERPL-MCNC: 3.2 G/DL (ref 3.4–5)
ALP SERPL-CCNC: 46 U/L (ref 40–150)
ALT SERPL W P-5'-P-CCNC: 29 U/L (ref 0–50)
AST SERPL W P-5'-P-CCNC: 25 U/L (ref 0–45)
BASOPHILS # BLD AUTO: 0 10E9/L (ref 0–0.2)
BASOPHILS NFR BLD AUTO: 0 %
BILIRUB DIRECT SERPL-MCNC: 0.2 MG/DL (ref 0–0.2)
BILIRUB SERPL-MCNC: 0.5 MG/DL (ref 0.2–1.3)
CALCIUM SERPL-MCNC: 9.5 MG/DL (ref 8.5–10.1)
CREAT SERPL-MCNC: 0.79 MG/DL (ref 0.52–1.04)
DIFFERENTIAL METHOD BLD: ABNORMAL
EOSINOPHIL # BLD AUTO: 0 10E9/L (ref 0–0.7)
EOSINOPHIL NFR BLD AUTO: 0 %
ERYTHROCYTE [DISTWIDTH] IN BLOOD BY AUTOMATED COUNT: 14.7 % (ref 10–15)
GFR SERPL CREATININE-BSD FRML MDRD: 67 ML/MIN/{1.73_M2}
HCT VFR BLD AUTO: 35.7 % (ref 35–47)
HGB BLD-MCNC: 11.7 G/DL (ref 11.7–15.7)
IMM GRANULOCYTES # BLD: 0 10E9/L (ref 0–0.4)
IMM GRANULOCYTES NFR BLD: 0.2 %
INR PPP: 3.67 (ref 0.86–1.14)
KAPPA LC UR-MCNC: 1.95 MG/DL (ref 0.33–1.94)
KAPPA LC/LAMBDA SER: 3.36 {RATIO} (ref 0.26–1.65)
LAMBDA LC SERPL-MCNC: 0.58 MG/DL (ref 0.57–2.63)
LYMPHOCYTES # BLD AUTO: 0.4 10E9/L (ref 0.8–5.3)
LYMPHOCYTES NFR BLD AUTO: 6.2 %
MCH RBC QN AUTO: 32.1 PG (ref 26.5–33)
MCHC RBC AUTO-ENTMCNC: 32.8 G/DL (ref 31.5–36.5)
MCV RBC AUTO: 98 FL (ref 78–100)
MONOCYTES # BLD AUTO: 0.1 10E9/L (ref 0–1.3)
MONOCYTES NFR BLD AUTO: 1.8 %
NEUTROPHILS # BLD AUTO: 5.5 10E9/L (ref 1.6–8.3)
NEUTROPHILS NFR BLD AUTO: 91.8 %
NRBC # BLD AUTO: 0 10*3/UL
NRBC BLD AUTO-RTO: 0 /100
PLATELET # BLD AUTO: 173 10E9/L (ref 150–450)
PROT SERPL-MCNC: 5.8 G/DL (ref 6.8–8.8)
RBC # BLD AUTO: 3.64 10E12/L (ref 3.8–5.2)
WBC # BLD AUTO: 6 10E9/L (ref 4–11)

## 2019-01-16 PROCEDURE — 96375 TX/PRO/DX INJ NEW DRUG ADDON: CPT

## 2019-01-16 PROCEDURE — 99214 OFFICE O/P EST MOD 30 MIN: CPT | Performed by: INTERNAL MEDICINE

## 2019-01-16 PROCEDURE — G0463 HOSPITAL OUTPT CLINIC VISIT: HCPCS | Mod: 25

## 2019-01-16 PROCEDURE — 84165 PROTEIN E-PHORESIS SERUM: CPT | Performed by: INTERNAL MEDICINE

## 2019-01-16 PROCEDURE — 00000402 ZZHCL STATISTIC TOTAL PROTEIN: Performed by: INTERNAL MEDICINE

## 2019-01-16 PROCEDURE — 96413 CHEMO IV INFUSION 1 HR: CPT

## 2019-01-16 PROCEDURE — 82310 ASSAY OF CALCIUM: CPT | Performed by: INTERNAL MEDICINE

## 2019-01-16 PROCEDURE — 83883 ASSAY NEPHELOMETRY NOT SPEC: CPT | Performed by: INTERNAL MEDICINE

## 2019-01-16 PROCEDURE — 96367 TX/PROPH/DG ADDL SEQ IV INF: CPT

## 2019-01-16 PROCEDURE — 82565 ASSAY OF CREATININE: CPT | Performed by: INTERNAL MEDICINE

## 2019-01-16 PROCEDURE — 96401 CHEMO ANTI-NEOPL SQ/IM: CPT

## 2019-01-16 PROCEDURE — 80076 HEPATIC FUNCTION PANEL: CPT | Performed by: INTERNAL MEDICINE

## 2019-01-16 PROCEDURE — A9270 NON-COVERED ITEM OR SERVICE: HCPCS | Mod: GY | Performed by: INTERNAL MEDICINE

## 2019-01-16 PROCEDURE — 85610 PROTHROMBIN TIME: CPT | Performed by: INTERNAL MEDICINE

## 2019-01-16 PROCEDURE — 25000128 H RX IP 250 OP 636: Performed by: INTERNAL MEDICINE

## 2019-01-16 PROCEDURE — 25000132 ZZH RX MED GY IP 250 OP 250 PS 637: Performed by: INTERNAL MEDICINE

## 2019-01-16 PROCEDURE — 85025 COMPLETE CBC W/AUTO DIFF WBC: CPT | Performed by: INTERNAL MEDICINE

## 2019-01-16 PROCEDURE — 96415 CHEMO IV INFUSION ADDL HR: CPT

## 2019-01-16 RX ORDER — ACETAMINOPHEN 325 MG/1
650 TABLET ORAL ONCE
Status: COMPLETED | OUTPATIENT
Start: 2019-01-16 | End: 2019-01-16

## 2019-01-16 RX ORDER — OXYCODONE HYDROCHLORIDE 15 MG/1
15 TABLET ORAL EVERY 8 HOURS PRN
Qty: 90 TABLET | Refills: 0 | Status: SHIPPED | OUTPATIENT
Start: 2019-01-16 | End: 2019-02-13

## 2019-01-16 RX ORDER — ZOLEDRONIC ACID 0.04 MG/ML
4 INJECTION, SOLUTION INTRAVENOUS ONCE
Status: CANCELLED | OUTPATIENT
Start: 2019-04-11 | End: 2019-04-10

## 2019-01-16 RX ORDER — HEPARIN SODIUM (PORCINE) LOCK FLUSH IV SOLN 100 UNIT/ML 100 UNIT/ML
5 SOLUTION INTRAVENOUS EVERY 8 HOURS
Status: DISCONTINUED | OUTPATIENT
Start: 2019-01-16 | End: 2019-01-16 | Stop reason: HOSPADM

## 2019-01-16 RX ORDER — DEXAMETHASONE 4 MG/1
TABLET ORAL
Qty: 28 TABLET | Refills: 0 | Status: SHIPPED | OUTPATIENT
Start: 2019-01-16 | End: 2019-03-13

## 2019-01-16 RX ADMIN — BORTEZOMIB 2.1 MG: 3.5 INJECTION, POWDER, LYOPHILIZED, FOR SOLUTION INTRAVENOUS; SUBCUTANEOUS at 12:01

## 2019-01-16 RX ADMIN — DEXAMETHASONE SODIUM PHOSPHATE 12 MG: 10 INJECTION, SOLUTION INTRAMUSCULAR; INTRAVENOUS at 10:18

## 2019-01-16 RX ADMIN — SODIUM CHLORIDE 250 ML: 9 INJECTION, SOLUTION INTRAVENOUS at 09:58

## 2019-01-16 RX ADMIN — ACETAMINOPHEN 650 MG: 325 TABLET ORAL at 10:10

## 2019-01-16 RX ADMIN — ZOLEDRONIC ACID 3.3 MG: 4 INJECTION, SOLUTION, CONCENTRATE INTRAVENOUS at 09:58

## 2019-01-16 RX ADMIN — DARATUMUMAB 1000 MG: 100 INJECTION, SOLUTION, CONCENTRATE INTRAVENOUS at 11:03

## 2019-01-16 RX ADMIN — HEPARIN SODIUM (PORCINE) LOCK FLUSH IV SOLN 100 UNIT/ML 5 ML: 100 SOLUTION at 12:40

## 2019-01-16 RX ADMIN — DIPHENHYDRAMINE HYDROCHLORIDE 50 MG: 50 INJECTION INTRAMUSCULAR; INTRAVENOUS at 10:35

## 2019-01-16 ASSESSMENT — MIFFLIN-ST. JEOR
SCORE: 1028.28
SCORE: 1028.53

## 2019-01-16 ASSESSMENT — PAIN SCALES - GENERAL: PAINLEVEL: NO PAIN (0)

## 2019-01-16 NOTE — PROGRESS NOTES
Infusion Nursing Note:  Amiar Arreola presents today for C22D1 Darzalex/Velcade/Zometa.    Patient seen by provider today: Yes: Dr. Roberts   present during visit today: Not Applicable.    Note: N/A.    Intravenous Access:  Labs drawn without difficulty.  Implanted Port.    Treatment Conditions:  Lab Results   Component Value Date    HGB 11.7 01/16/2019     Lab Results   Component Value Date    WBC 6.0 01/16/2019      Lab Results   Component Value Date    ANEU 5.5 01/16/2019     Lab Results   Component Value Date     01/16/2019      Lab Results   Component Value Date     09/20/2018                   Lab Results   Component Value Date    POTASSIUM 3.8 09/20/2018           No results found for: MAG         Lab Results   Component Value Date    CR 0.79 01/16/2019                   Lab Results   Component Value Date    BRADLEY 9.5 01/16/2019                Lab Results   Component Value Date    BILITOTAL 0.5 01/16/2019           Lab Results   Component Value Date    ALBUMIN 3.2 01/16/2019                    Lab Results   Component Value Date    ALT 29 01/16/2019           Lab Results   Component Value Date    AST 25 01/16/2019       Results reviewed, labs MET treatment parameters, ok to proceed with treatment.      Post Infusion Assessment:  Patient tolerated infusion without incident.  Patient tolerated injection without incident.  Blood return noted pre and post infusion.  Site patent and intact, free from redness, edema or discomfort.  No evidence of extravasations.  Access discontinued per protocol.    Discharge Plan:   Prescription refills given for Decadron.  Discharge instructions reviewed with: Patient.  Patient and/or family verbalized understanding of discharge instructions and all questions answered.  Copy of AVS reviewed with patient and/or family.  Patient will return 1/31/19 for next appointment.  Patient discharged in stable condition accompanied by: son.  Departure Mode:  Ambulatory.    David Kearney RN

## 2019-01-16 NOTE — LETTER
1/16/2019         RE: Amira Arreola  [address]  [facility] [address]        Dear Colleague,    Thank you for referring your patient, Amira Arreola, to the [facility]. Please see a copy of my visit note below.    Visit Date:   01/16/2019      SUBJECTIVE:  Ms. Arreola is an 86-year-old female with kappa free light chain multiple myeloma.  She is on Velcade, daratumumab and dexamethasone.  She is tolerating it well.  Disease has been responding.      The patient is elderly.  She has weakness.  About 4 days ago she slipped from a chair and fell on floor.  She did not have any significant injury.  No bleeding.      The patient says that main problem is fatigue.  No headache.  No dizziness.  No chest pain.  No shortness of breath.  No nausea or vomiting.  No bleeding.  Appetite has been fairly good, just has chronic back pain.  She wants a refill on her oxycodone.      PHYSICAL EXAMINATION:  Unchanged.      LABORATORY DATA:  Reviewed.      ASSESSMENT:   1.  An 86-year-old female with kappa free light chain multiple myeloma.   2.  Fatigue.   3.  Chronic back pain.      PLAN:   1.  I discussed regarding myeloma.  It is stable.  Kappa free light chain on 12/19/2018 was down to 1.94.   She is currently on Velcade, daratumumab and dexamethasone.  She is tolerating it well.  She will continue on that.      The patient is elderly and she has to drive to the clinic.  I did bring up the question of just giving her daratumumab and dexamethasone.  That way, she has to come only once a month to the Infusion Center.  The patient will think about it.   2.  The patient has weakness.  She is elderly.  She is at high risk of falling.  She is on warfarin.  She is at high risk of bleeding complication.  The patient advised to avoid falls.  I advised her to use a cane and walker.  If fall becomes a recurrent problem, we may have to consider stopping the anticoagulation.     3.  For back pain, she will  continue on oxycodone.  Prescription refilled.   4.  She had a few questions, which were all answered.  I will see her in a month.  Advised her to call us with any questions or concerns.         ITZ LOPEZ MD             D: 2019   T: 2019   MT: ROXANA      Name:     ALBERTO PARSON   MRN:      [id number]        Account:      [id number]   :      1932           Visit Date:   2019      Document: [id number]       Again, thank you for allowing me to participate in the care of your patient.        Sincerely,        Itz Lopez MD

## 2019-01-17 ENCOUNTER — ANTICOAGULATION THERAPY VISIT (OUTPATIENT)
Dept: FAMILY MEDICINE | Facility: CLINIC | Age: 84
End: 2019-01-17
Payer: MEDICARE

## 2019-01-17 ENCOUNTER — TELEPHONE (OUTPATIENT)
Dept: FAMILY MEDICINE | Facility: CLINIC | Age: 84
End: 2019-01-17

## 2019-01-17 LAB
ALBUMIN SERPL ELPH-MCNC: 3.7 G/DL (ref 3.7–5.1)
ALPHA1 GLOB SERPL ELPH-MCNC: 0.4 G/DL (ref 0.2–0.4)
ALPHA2 GLOB SERPL ELPH-MCNC: 0.8 G/DL (ref 0.5–0.9)
B-GLOBULIN SERPL ELPH-MCNC: 0.6 G/DL (ref 0.6–1)
GAMMA GLOB SERPL ELPH-MCNC: 0.2 G/DL (ref 0.7–1.6)
INR PPP: 5.1
M PROTEIN SERPL ELPH-MCNC: 0 G/DL
PROT PATTERN SERPL ELPH-IMP: ABNORMAL

## 2019-01-17 PROCEDURE — 99207 ZZC NO CHARGE NURSE ONLY: CPT | Performed by: INTERNAL MEDICINE

## 2019-01-17 NOTE — TELEPHONE ENCOUNTER
Reason for Call:  INR    Who is calling?  [facility]    Phone number:  [phone]    Fax number:  None    Name of caller: Senia    INR Value:  5.1    Are there any other concerns:  Yes: Couple of falls over the weekend and she has a few skin tears on her arm    Can we leave a detailed message on this number? YES        Call taken on 1/17/2019 at 12:06 PM by Vera Fajardo

## 2019-01-17 NOTE — PROGRESS NOTES
"ANTICOAGULATION FOLLOW-UP CLINIC VISIT    Patient Name:  Amira Arreola  Date:  2019  Contact Type:  Telephone/ Senia  nurse    SUBJECTIVE:     Patient Findings     Comments:   HC nurse calling with today's INR:  5.1   Reports patient fell last weekend and has \"a few\" skin tears that are needing bandage changes today.  Otherwise, denies any illness or med change that would explain the high INR.    Denies unusual bleeding symptoms, even during today's bandage changes.             OBJECTIVE    INR   Date Value Ref Range Status   2019 5.1  Final       ASSESSMENT / PLAN  INR assessment SUPRA    Recheck INR In: 1 DAY    INR Location [facility]      Anticoagulation Summary  As of 2019    INR goal:   2.0-3.0   TTR:   68.5 % (2.6 y)   INR used for dosin.1! (2019)   Warfarin maintenance plan:   2 mg (4 mg x 0.5) every Fri; 4 mg (4 mg x 1) all other days   Full warfarin instructions:   : Hold; Otherwise 2 mg every Fri; 4 mg all other days   Weekly warfarin total:   26 mg   Plan last modified:   Tigist Corral, RN (10/25/2018)   Next INR check:   2019   Target end date:   Indefinite    Indications    Long-term (current) use of anticoagulants [Z79.01] [Z79.01]  Atrial fibrillation (H) [I48.91] (Resolved) [I48.91]             Anticoagulation Episode Summary     INR check location:       Preferred lab:       Send INR reminders to:    ANTICOAGULATION    Comments:    INR to be drawn at infusion visit OR home care nurse. Watch result notes. Patient can be reached at home phone [phone]. Do not leave dosing with spouse.   Pt advised to call for triage if not hearing back from us for dosing.      Anticoagulation Care Providers     Provider Role Specialty Phone number    Addy Frias MD [facility] Internal Medicine [phone]            See the Encounter Report to view Anticoagulation Flowsheet and Dosing Calendar (Go to Encounters tab in chart review, and find the " Anticoagulation Therapy Visit)    Dosage adjustment made based on physician directed care plan.    Orders given: Warfarin dosing and next INR date.  See flowsheet.   Advised patient eat dark greens today, for ex: canned spinach, broccoli.     Some signs and symptoms of bleeding include: Nose bleed or cut that does not stop bleeding in 10 minutes, bleeding of the gums, vomiting (will look like coffee grounds) or coughing up blood, unusual, easy or large areas of bruising, increased or unexpected vaginal bleeding or increased menstrual flow, red or black stools, red or orange urine, prolonged or severe headache, pale skin, unusual or constant tiredness.  If you have these please call 911 or seek medical care immediately.         Bri Mcbride RN

## 2019-01-17 NOTE — PROGRESS NOTES
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     1. On 09/21/2015, WBC of 4.2, hemoglobin of 13.2 and platelets of 138.    -On 09/29/2015, SPEP does not reveal any M-spike.   -On 10/02/2015, JANET does not reveal any monoclonal protein.     -On 10/22/2015, urine immunofixation reveals monoclonal free kappa light chain.    2. On 05/11/2016, kappa light chain of 50, lambda light chain of 0.32 and ratio of kappa to lambda of 156.2.  3. Bone marrow biopsy on 05/25/2016 reveals 40-50% kappa light chain restricted plasma cells.  Cytogenetics is normal. FISH panel reveals translocation 11;14.    4. MRI of bones on 06/21/2016 and 06/22/2016 reveals myeloma lesions.  5. On 08/24/2016, she was started on revlimid with dexamethasone 20 mg weekly. She did not have any significant response to treatment.   6. Velcade and dexamethasone started on 03/21/2017.    7. Daratumumab added to velcade and dexamethasone on 05/31/2017.   -Velcade given every 14 days starting 08/01/2018.     SUBJECTIVE:  Mrs. Arreola is an 86-year-old female with kappa free light chain multiple myeloma.  She is on Velcade, daratumumab and dexamethasone.  She is tolerating it well.  Disease has been responding.      Patient is elderly.  She has weakness.  About 4 days ago she slipped from a chair and fell on floor.  She did not have any significant injury.  No bleeding.      Patient says that main problem is fatigue.  No headache.  No dizziness.  No chest pain.  No shortness of breath.  No nausea or vomiting.  No bleeding.  Appetite has been fairly good. She has chronic back pain.  She wants a refill on her oxycodone.      PHYSICAL EXAMINATION:   Alert and oriented x 3. ECOG PS of 2.  EYES:  No icterus.   THROAT:  No ulcer or thrush.   NECK:  Supple. No lymphadenopathy.   AXILLAE:  No lymphadenopathy.   LUNGS:  Good air entry bilaterally.  No crackles or wheezing.   HEART:   Regular.  No murmur.   ABDOMEN:  Soft and nontender.  No mass.   EXTREMITIES: Bilateral pedal edema. No calf swelling or tenderness.   SKIN: No petechia.     LABORATORY DATA:  Reviewed.      ASSESSMENT:   1.  An 86-year-old female with kappa free light chain multiple myeloma.   2.  Fatigue.   3.  Chronic back pain.      PLAN:   1.  I discussed regarding myeloma.  It is stable.  Kappa free light chain on 2018 was down to 1.94.   She is currently on Velcade, daratumumab and dexamethasone.  She is tolerating it well.  She will continue on that.    Patient is elderly and she has to drive to the clinic.  I did bring up the question of just giving her daratumumab and dexamethasone.  That way, she has to come only once a month to the Infusion [facility].  Patient will think about it.   2.  Patient has weakness.  She is elderly.  She is at high risk of falling.  She is on warfarin.  She is at high risk of bleeding complication.  Patient advised to avoid falls.  I advised her to use a cane and walker.  If fall becomes a recurrent problem, we may have to consider stopping the anticoagulation.     3.  For back pain, she will continue on oxycodone.  Prescription refilled.   4.  She had a few questions, which were all answered.  I will see her in a month.  Advised her to call us with any questions or concerns.         ITZ LOPEZ MD             D: 2019   T: 2019   MT: ROXANA      Name:     ALBERTO PARSON   MRN:      [id number]        Account:      [id number]   :      1932           Visit Date:   2019      Document: [id number]

## 2019-01-18 ENCOUNTER — ANTICOAGULATION THERAPY VISIT (OUTPATIENT)
Dept: FAMILY MEDICINE | Facility: CLINIC | Age: 84
End: 2019-01-18
Payer: MEDICARE

## 2019-01-18 ENCOUNTER — TELEPHONE (OUTPATIENT)
Dept: FAMILY MEDICINE | Facility: CLINIC | Age: 84
End: 2019-01-18

## 2019-01-18 LAB — INR PPP: 2.8

## 2019-01-18 PROCEDURE — 99207 ZZC NO CHARGE NURSE ONLY: CPT | Performed by: INTERNAL MEDICINE

## 2019-01-18 NOTE — PROGRESS NOTES
ANTICOAGULATION FOLLOW-UP CLINIC VISIT    Patient Name:  Amira Arreola  Date:  2019  Contact Type:  Telephone/ HAKEEM Conn nurse    SUBJECTIVE:        OBJECTIVE    INR   Date Value Ref Range Status   2019 2.8  Final       ASSESSMENT / PLAN  INR assessment THER    Recheck INR In: 1 WEEK    INR Location [facility]      Anticoagulation Summary  As of 2019    INR goal:   2.0-3.0   TTR:   68.4 % (2.6 y)   INR used for dosin.8 (2019)   Warfarin maintenance plan:   2 mg (4 mg x 0.5) every Fri; 4 mg (4 mg x 1) all other days   Full warfarin instructions:   2 mg every Fri; 4 mg all other days   Weekly warfarin total:   26 mg   No change documented:   Bri Mcbride RN   Plan last modified:   Tigist Corral RN (10/25/2018)   Next INR check:   2019   Target end date:   Indefinite    Indications    Long-term (current) use of anticoagulants [Z79.01] [Z79.01]  Atrial fibrillation (H) [I48.91] (Resolved) [I48.91]             Anticoagulation Episode Summary     INR check location:       Preferred lab:       Send INR reminders to:    ANTICOAGULATION    Comments:    INR to be drawn at infusion visit OR home care nurse. Watch result notes. Patient can be reached at home phone [phone]. Do not leave dosing with spouse.   Pt advised to call for triage if not hearing back from us for dosing.      Anticoagulation Care Providers     Provider Role Specialty Phone number    Addy Frias MD [facility] Internal Medicine [phone]            See the Encounter Report to view Anticoagulation Flowsheet and Dosing Calendar (Go to Encounters tab in chart review, and find the Anticoagulation Therapy Visit)    Dosage adjustment made based on physician directed care plan.    Orders given: Warfarin dosing and next INR date.  See flowsheet.       Bri Mcbride RN

## 2019-01-18 NOTE — TELEPHONE ENCOUNTER
Reason for Call:  INR    Who is calling?  Home [facility]: [facility]    Phone number:  [phone]    Fax number:  =    Name of caller: Senia    INR Value:  2.8    Are there any other concerns:  No    Can we leave a detailed message on this number? YES     X to RN     Call taken on 1/18/2019 at 11:37 AM by Pacheco Villagran

## 2019-01-25 ENCOUNTER — OFFICE VISIT (OUTPATIENT)
Dept: UROLOGY | Facility: CLINIC | Age: 84
End: 2019-01-25
Payer: MEDICARE

## 2019-01-25 ENCOUNTER — ANTICOAGULATION THERAPY VISIT (OUTPATIENT)
Dept: NURSING | Facility: CLINIC | Age: 84
End: 2019-01-25
Payer: MEDICARE

## 2019-01-25 VITALS
HEIGHT: 66 IN | SYSTOLIC BLOOD PRESSURE: 148 MMHG | OXYGEN SATURATION: 99 % | BODY MASS INDEX: 21.38 KG/M2 | DIASTOLIC BLOOD PRESSURE: 74 MMHG | HEART RATE: 72 BPM | WEIGHT: 133 LBS

## 2019-01-25 DIAGNOSIS — N39.46 MIXED INCONTINENCE: Primary | ICD-10-CM

## 2019-01-25 DIAGNOSIS — N32.81 URGENCY-FREQUENCY SYNDROME: ICD-10-CM

## 2019-01-25 LAB
INR POINT OF CARE: 1.6 (ref 0.86–1.14)
RESIDUAL VOLUME (RV) (EXTERNAL): 32

## 2019-01-25 PROCEDURE — 85610 PROTHROMBIN TIME: CPT | Mod: QW

## 2019-01-25 PROCEDURE — 99207 ZZC NO CHARGE NURSE ONLY: CPT

## 2019-01-25 PROCEDURE — 36416 COLLJ CAPILLARY BLOOD SPEC: CPT

## 2019-01-25 PROCEDURE — 51798 US URINE CAPACITY MEASURE: CPT | Performed by: UROLOGY

## 2019-01-25 PROCEDURE — 99202 OFFICE O/P NEW SF 15 MIN: CPT | Mod: 25 | Performed by: UROLOGY

## 2019-01-25 ASSESSMENT — MIFFLIN-ST. JEOR: SCORE: 1060.03

## 2019-01-25 ASSESSMENT — PAIN SCALES - GENERAL: PAINLEVEL: NO PAIN (0)

## 2019-01-25 NOTE — PROGRESS NOTES
ANTICOAGULATION FOLLOW-UP CLINIC VISIT    Patient Name:  Amira Arreola  Date:  2019  Contact Type:  Face to Face    SUBJECTIVE:        OBJECTIVE    INR Protime   Date Value Ref Range Status   2019 1.6 (A) 0.86 - 1.14 Final       ASSESSMENT / PLAN  INR assessment SUB    Recheck INR In: 5 DAYS    INR Location Clinic      Anticoagulation Summary  As of 2019    INR goal:   2.0-3.0   TTR:   68.4 % (2.6 y)   INR used for dosin.6! (2019)   Warfarin maintenance plan:   2 mg (4 mg x 0.5) every Fri; 4 mg (4 mg x 1) all other days   Full warfarin instructions:   : 6 mg; : 6 mg; Otherwise 2 mg every Fri; 4 mg all other days   Weekly warfarin total:   26 mg   Plan last modified:   Tigist Corral RN (10/25/2018)   Next INR check:   2019   Target end date:   Indefinite    Indications    Long-term (current) use of anticoagulants [Z79.01] [Z79.01]  Atrial fibrillation (H) [I48.91] (Resolved) [I48.91]             Anticoagulation Episode Summary     INR check location:       Preferred lab:       Send INR reminders to:   [facility] ANTICOAGULATION    Comments:    INR to be drawn at infusion visit OR home care nurse. Watch result notes. Patient can be reached at home phone [phone]. Do not leave dosing with spouse.   Pt advised to call for triage if not hearing back from us for dosing.      Anticoagulation Care Providers     Provider Role Specialty Phone number    Addy Frias MD [facility] Internal Medicine [phone]            See the Encounter Report to view Anticoagulation Flowsheet and Dosing Calendar (Go to Encounters tab in chart review, and find the Anticoagulation Therapy Visit)    Dosage adjustment made based on physician directed care plan.  PT asking about getting homecare, not sure what would be needed but referral.  Pt does not qualify for homebound status, but states her  gets INR drawn. Can't verify reason for husbands as he is not fv pt, HIPPA/etc.     Called  [facility], and pt does have private pay home care.  Next INR can still be done at lab as pt will be at lab anyway, but future inrs can be done in Homecare if family so chooses.     Jessica Moody RN

## 2019-01-25 NOTE — PATIENT INSTRUCTIONS
Websites with free information:    [employer] patient website: www.voicesforpfd.org    Total Control Program: www.totalcontrolprogram.com    For your bladder we can consider trospium (an anticholinergic), need to check with your eye doctor that this would be okay    Pelvic floor physical therapy-[facility]    You can return to see me as needed down in [place] [phone]    It was a pleasure meeting with you today.  Thank you for allowing me and my team the privilege of caring for you today.  YOU are the reason we are here, and I truly hope we provided you with the excellent service you deserve.  Please let us know if there is anything else we can do for you so that we can be sure you are leaving completely satisfied with your care experience.

## 2019-01-25 NOTE — PROGRESS NOTES
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  She has reports a several year history of urinary frequency, urgency, mixed incontinence.    Urinary frequency can be every 90 minutes to every 4 hours. Constipation on softeners    Saw Dr Grullon in the past.  Had evaluation including cystoscopy and was told that Has stopped the mirabegron because it is too expensive and not working.  Also tried oxybutynin and solifencain.    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 Roberts     Multiple myeloma (H) 2016    dx 5/16 at [place], bone lesions seen on mri 6/16     OAB (overactive bladder) 2013    Dr. Grullon     Osteoporosis     fu done 2010 and stable, went off meds then, fu done 2013; has had gyn fu and added evista 2013 by gyn     Palpitations 4/16    nl echo, mildly dilated asc aorta     Paroxysmal atrial fibrillation (H) 4/16    had palp and ziopatch showed it, echo nl lv fxn, mild mr and tr, added coum and toprol, toprol dose raised 12/22/16     Sciatica of left side 12/13    Dr. Helen Ricardo 2004     SVT (supraventricular tachycardia) (H) 4/16    on ziopatch     Thrombocytopenia (H) 2014     Past Surgical  History:   Procedure Laterality Date     BONE MARROW BIOPSY, BONE SPECIMEN, NEEDLE/TROCAR N/A 2016    Procedure: BIOPSY BONE MARROW;  Surgeon: Bryan Patel MD;  Location:  GI     CATARACT IOL, RT/LT        SECTION  1965, 1966     COLONOSCOPY  2013    Procedure: COLONOSCOPY;  COLONOSCOPY;  Surgeon: Steffany Rockwell MD;  Location:  GI     EXCISE EXOSTOSIS TIBIA / FIBULA  2014    Procedure: EXCISE EXOSTOSIS TIBIA / FIBULA;  Surgeon: Naila Pichardo MD;  Location:  [facility]     hysteroscopy and d and c      due to bleeding     left anle replacement       right ankle surgery       Social History     Socioeconomic History     Marital status:      Spouse name: Tom     Number of children: 6     Years of education: Not on file     Highest education level: Not on file   Social Needs     Financial resource strain: Not on file     Food insecurity - worry: Not on file     Food insecurity - inability: Not on file     Transportation needs - medical: Not on file     Transportation needs - non-medical: Not on file   Occupational History     Occupation: rn anesthetist     Employer: RETIRED   Tobacco Use     Smoking status: Never Smoker     Smokeless tobacco: Never Used   Substance and Sexual Activity     Alcohol use: No     Alcohol/week: 0.0 oz     Drug use: No     Sexual activity: No   Other Topics Concern     Parent/sibling w/ CABG, MI or angioplasty before 65F 55M? Not Asked   Social History Narrative     Not on file     Family History   Problem Relation Age of Onset     Heart Disease Father      C.A.D. Mother      Cerebrovascular Disease Brother      Family History Negative Sister      Family History Negative Sister      Family History Negative Brother      ROS    Allergies   Allergen Reactions     Blood Transfusion Related (Informational Only)      Blood antigens related to receiving Darzelex-AG     Penicillin [Penicillins] Rash     Blotches on chest   "    Current Outpatient Medications   Medication     ACYCLOVIR PO     Calcium Citrate-Vitamin D (CALCIUM CITRATE + PO)     carboxymethylcellulose (REFRESH PLUS) 0.5 % SOLN     Cholecalciferol (VITAMIN D3 PO)     cycloSPORINE (RESTASIS) 0.05 % ophthalmic emulsion     Desloratadine (CLARINEX PO)     dexamethasone (DECADRON) 4 MG tablet     dexamethasone (DECADRON) 4 MG tablet     lidocaine-prilocaine (EMLA) cream     metoprolol succinate ER (TOPROL-XL) 50 MG 24 hr tablet     Multiple Vitamin (DAILY MULTIVITAMIN PO)     oxyCODONE IR (ROXICODONE) 15 MG tablet     polyethylene glycol (MIRALAX/GLYCOLAX) powder     Polyvinyl Alcohol-Povidone (REFRESH OP)     SIMBRINZA 1-0.2 % ophthalmic suspension     UNABLE TO FIND     warfarin (COUMADIN) 4 MG tablet     Zoledronic Acid (ZOMETA IV)     Acetaminophen (TYLENOL PO)     acyclovir (ZOVIRAX) 400 MG tablet     DiphenhydrAMINE HCl (BENADRYL PO)     LORazepam (ATIVAN) 0.5 MG tablet     prochlorperazine (COMPAZINE) 10 MG tablet     triamcinolone (KENALOG) 0.5 % cream     No current facility-administered medications for this visit.      /74 (BP Location: Left arm, Patient Position: Sitting, Cuff Size: Adult Regular)   Pulse 72   Ht 1.676 m (5' 6\")   Wt 60.3 kg (133 lb)   SpO2 99%   BMI 21.47 kg/m   No LMP recorded. Patient is postmenopausal. Body mass index is 21.47 kg/m .  She is alert and oriented.  She is well groomed, comfortable in no acute distress. Normal mood and affect.   Non-labored breathing. Normocephalic without masses, lesions, obvious abnormalities. Pelvic exam declined per patient    Urine dip voided prior to being roomed    PVR 32 mL by bladder scan    A/P: Amira Arreola is a 86 year old F with mixed incontinence, urgency frequency    We discussed the etiologies of stress and urge incontinence and how they differ. We discussed that the options of treating stress incontinence to include observation, weight loss, pelvic floor physical therapy, " incontinence pessary, urethral bulking agents, and surgical correction most commonly with midurethral sling or autologous rectus fascial sling. We then discussed that urge incontinence treatments include observation, weight loss, medications most commonly anticholinergics, physical therapy, biofeedback, intravesical botulinum toxin, percutaneous tibial nerve stimulation and sacral neuromodulation.   Patient not a candidate for anticholinergic unless okayed by her ophthalmologist.  Would try trospium as this is a quaternary amine and less risk of confusion  Offered pelvic floor therapy but patient not interested at this time    Ultimately today after discussing options she is really not interested in pursuing further options    RTC prn    25 minutes were spent with the patient today, > 50% in counseling and coordination of care    Snehal Kendrick MD MPH    Urology    CC  Patient Care Team:  Addy Frias MD as PCP - General (Internal Medicine)  Addy Frias MD as PCP - Assigned PCP  [facility] ([facility] ([facility]  ITZ LOPEZ

## 2019-01-25 NOTE — NURSING NOTE
Chief Complaint   Patient presents with     Clinic Care Coordination - Follow-up     Pt here for incontinence     PVR is 32mL  Kena Solis CMA

## 2019-01-25 NOTE — LETTER
1/25/2019       RE: Amira Arreola  [address]  [facility] [address]     Dear Colleague,    Thank you for referring your patient, Amira Arreola, to the [facility] UROLOGY CLINIC [facility] at [facility]. Please see a copy of my visit note below.    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  She has reports a several year history of urinary frequency, urgency, mixed incontinence.    Urinary frequency can be every 90 minutes to every 4 hours. Constipation on softeners    Saw Dr Grullon in the past.  Had evaluation including cystoscopy and was told that Has stopped the mirabegron because it is too expensive and not working.  Also tried oxybutynin and solifencain.    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 Roberts     Multiple myeloma (H) 2016    dx 5/16 at [place], bone lesions seen on mri 6/16     OAB (overactive bladder) 2013    Dr. Grullon     Osteoporosis     fu done 2010 and stable, went off meds then, fu done 2013; has had gyn fu and added evista 2013 by gyn     Palpitations 4/16    nl echo, mildly dilated asc aorta     Paroxysmal  atrial fibrillation (H)     had palp and ziopatch showed it, echo nl lv fxn, mild mr and tr, added coum and toprol, toprol dose raised 16     Sciatica of left side     Dr. Helen Ricardo      SVT (supraventricular tachycardia) (H)     on ziopatch     Thrombocytopenia (H)      Past Surgical History:   Procedure Laterality Date     BONE MARROW BIOPSY, BONE SPECIMEN, NEEDLE/TROCAR N/A 2016    Procedure: BIOPSY BONE MARROW;  Surgeon: Bryan Patel MD;  Location:  GI     CATARACT IOL, RT/LT        SECTION  ,      COLONOSCOPY  2013    Procedure: COLONOSCOPY;  COLONOSCOPY;  Surgeon: Steffany Rockwell MD;  Location:  GI     EXCISE EXOSTOSIS TIBIA / FIBULA  2014    Procedure: EXCISE EXOSTOSIS TIBIA / FIBULA;  Surgeon: Naila Pichardo MD;  Location:  SD     hysteroscopy and d and c      due to bleeding     left anle replacement       right ankle surgery       Social History     Socioeconomic History     Marital status:      Spouse name: Tom     Number of children: 6     Years of education: Not on file     Highest education level: Not on file   Social Needs     Financial resource strain: Not on file     Food insecurity - worry: Not on file     Food insecurity - inability: Not on file     Transportation needs - medical: Not on file     Transportation needs - non-medical: Not on file   Occupational History     Occupation: rn anesthetist     Employer: RETIRED   Tobacco Use     Smoking status: Never Smoker     Smokeless tobacco: Never Used   Substance and Sexual Activity     Alcohol use: No     Alcohol/week: 0.0 oz     Drug use: No     Sexual activity: No   Other Topics Concern     Parent/sibling w/ CABG, MI or angioplasty before 65F 55M? Not Asked   Social History Narrative     Not on file     Family History   Problem Relation Age of Onset     Heart Disease Father      C.A.D. Mother      Cerebrovascular Disease  "Brother      Family History Negative Sister      Family History Negative Sister      Family History Negative Brother      ROS    Allergies   Allergen Reactions     Blood Transfusion Related (Informational Only)      Blood antigens related to receiving Darzelex-AG     Penicillin [Penicillins] Rash     Blotches on chest      Current Outpatient Medications   Medication     ACYCLOVIR PO     Calcium Citrate-Vitamin D (CALCIUM CITRATE + PO)     carboxymethylcellulose (REFRESH PLUS) 0.5 % SOLN     Cholecalciferol (VITAMIN D3 PO)     cycloSPORINE (RESTASIS) 0.05 % ophthalmic emulsion     Desloratadine (CLARINEX PO)     dexamethasone (DECADRON) 4 MG tablet     dexamethasone (DECADRON) 4 MG tablet     lidocaine-prilocaine (EMLA) cream     metoprolol succinate ER (TOPROL-XL) 50 MG 24 hr tablet     Multiple Vitamin (DAILY MULTIVITAMIN PO)     oxyCODONE IR (ROXICODONE) 15 MG tablet     polyethylene glycol (MIRALAX/GLYCOLAX) powder     Polyvinyl Alcohol-Povidone (REFRESH OP)     SIMBRINZA 1-0.2 % ophthalmic suspension     UNABLE TO FIND     warfarin (COUMADIN) 4 MG tablet     Zoledronic Acid (ZOMETA IV)     Acetaminophen (TYLENOL PO)     acyclovir (ZOVIRAX) 400 MG tablet     DiphenhydrAMINE HCl (BENADRYL PO)     LORazepam (ATIVAN) 0.5 MG tablet     prochlorperazine (COMPAZINE) 10 MG tablet     triamcinolone (KENALOG) 0.5 % cream     No current facility-administered medications for this visit.      /74 (BP Location: Left arm, Patient Position: Sitting, Cuff Size: Adult Regular)   Pulse 72   Ht 1.676 m (5' 6\")   Wt 60.3 kg (133 lb)   SpO2 99%   BMI 21.47 kg/m    No LMP recorded. Patient is postmenopausal. Body mass index is 21.47 kg/m .  She is alert and oriented.  She is well groomed, comfortable in no acute distress. Normal mood and affect.   Non-labored breathing. Normocephalic without masses, lesions, obvious abnormalities. Pelvic exam declined per patient    Urine dip voided prior to being roomed    PVR 32 mL by " bladder scan    A/P: Amira Arreola is a 86 year old F with mixed incontinence, urgency frequency    We discussed the etiologies of stress and urge incontinence and how they differ. We discussed that the options of treating stress incontinence to include observation, weight loss, pelvic floor physical therapy, incontinence pessary, urethral bulking agents, and surgical correction most commonly with midurethral sling or autologous rectus fascial sling. We then discussed that urge incontinence treatments include observation, weight loss, medications most commonly anticholinergics, physical therapy, biofeedback, intravesical botulinum toxin, percutaneous tibial nerve stimulation and sacral neuromodulation.   Patient not a candidate for anticholinergic unless okayed by her ophthalmologist.  Would try trospium as this is a quaternary amine and less risk of confusion  Offered pelvic floor therapy but patient not interested at this time    Ultimately today after discussing options she is really not interested in pursuing further options    RTC prn    25 minutes were spent with the patient today, > 50% in counseling and coordination of care    Snehal Kendrick MD MPH    Urology    CC  Patient Care Team:  Addy Frias MD as PCP - General (Internal Medicine)  Care, [facility] ([facility] ([facility] ([place]))  ITZ LOPEZ

## 2019-01-28 ENCOUNTER — TELEPHONE (OUTPATIENT)
Dept: FAMILY MEDICINE | Facility: CLINIC | Age: 84
End: 2019-01-28

## 2019-01-28 DIAGNOSIS — I48.0 PAROXYSMAL ATRIAL FIBRILLATION (H): Primary | ICD-10-CM

## 2019-01-28 NOTE — TELEPHONE ENCOUNTER
Reason for Call: Request for an order or referral:    Order or referral being requested: INR- standing order    Date needed: as soon as possible    Has the patient been seen by the PCP for this problem? YES    Additional comments: David from [facility] infusion called and requested a standing INR order- she gets this done the same day as infusion ( 1/30)    She will get it done through Homecare in the future but for now requesting a standing order      Ph. [phone] NO      Ph. [phone]  is okay         Call taken on 1/28/2019 at 8:53 AM by Lee Ann Howard

## 2019-01-30 ENCOUNTER — HOSPITAL ENCOUNTER (OUTPATIENT)
Facility: CLINIC | Age: 84
Setting detail: SPECIMEN
Discharge: HOME | End: 2019-01-30
Attending: INTERNAL MEDICINE | Admitting: INTERNAL MEDICINE
Payer: MEDICARE

## 2019-01-30 ENCOUNTER — ANTICOAGULATION THERAPY VISIT (OUTPATIENT)
Dept: FAMILY MEDICINE | Facility: CLINIC | Age: 84
End: 2019-01-30
Payer: MEDICARE

## 2019-01-30 ENCOUNTER — TELEPHONE (OUTPATIENT)
Dept: FAMILY MEDICINE | Facility: CLINIC | Age: 84
End: 2019-01-30

## 2019-01-30 ENCOUNTER — INFUSION THERAPY VISIT (OUTPATIENT)
Dept: INFUSION THERAPY | Facility: CLINIC | Age: 84
End: 2019-01-30
Attending: INTERNAL MEDICINE
Payer: MEDICARE

## 2019-01-30 VITALS
BODY MASS INDEX: 21.53 KG/M2 | DIASTOLIC BLOOD PRESSURE: 77 MMHG | SYSTOLIC BLOOD PRESSURE: 121 MMHG | WEIGHT: 133.4 LBS | TEMPERATURE: 97.6 F | OXYGEN SATURATION: 96 % | HEART RATE: 72 BPM | RESPIRATION RATE: 16 BRPM

## 2019-01-30 DIAGNOSIS — C90.00 MULTIPLE MYELOMA NOT HAVING ACHIEVED REMISSION (H): Primary | ICD-10-CM

## 2019-01-30 DIAGNOSIS — I48.0 PAROXYSMAL ATRIAL FIBRILLATION (H): ICD-10-CM

## 2019-01-30 DIAGNOSIS — Z95.828 PORT-A-CATH IN PLACE: ICD-10-CM

## 2019-01-30 LAB
BASOPHILS # BLD AUTO: 0 10E9/L (ref 0–0.2)
BASOPHILS NFR BLD AUTO: 0 %
DIFFERENTIAL METHOD BLD: ABNORMAL
EOSINOPHIL # BLD AUTO: 0 10E9/L (ref 0–0.7)
EOSINOPHIL NFR BLD AUTO: 0.2 %
ERYTHROCYTE [DISTWIDTH] IN BLOOD BY AUTOMATED COUNT: 14.5 % (ref 10–15)
HCT VFR BLD AUTO: 35.3 % (ref 35–47)
HGB BLD-MCNC: 11.5 G/DL (ref 11.7–15.7)
IMM GRANULOCYTES # BLD: 0 10E9/L (ref 0–0.4)
IMM GRANULOCYTES NFR BLD: 0.2 %
INR PPP: 3.09 (ref 0.86–1.14)
LYMPHOCYTES # BLD AUTO: 0.4 10E9/L (ref 0.8–5.3)
LYMPHOCYTES NFR BLD AUTO: 6.9 %
MCH RBC QN AUTO: 31.8 PG (ref 26.5–33)
MCHC RBC AUTO-ENTMCNC: 32.6 G/DL (ref 31.5–36.5)
MCV RBC AUTO: 98 FL (ref 78–100)
MONOCYTES # BLD AUTO: 0.1 10E9/L (ref 0–1.3)
MONOCYTES NFR BLD AUTO: 1.1 %
NEUTROPHILS # BLD AUTO: 5 10E9/L (ref 1.6–8.3)
NEUTROPHILS NFR BLD AUTO: 91.6 %
NRBC # BLD AUTO: 0 10*3/UL
NRBC BLD AUTO-RTO: 0 /100
PLATELET # BLD AUTO: 170 10E9/L (ref 150–450)
RBC # BLD AUTO: 3.62 10E12/L (ref 3.8–5.2)
WBC # BLD AUTO: 5.5 10E9/L (ref 4–11)

## 2019-01-30 PROCEDURE — 99207 ZZC NO CHARGE NURSE ONLY: CPT | Performed by: INTERNAL MEDICINE

## 2019-01-30 PROCEDURE — 85610 PROTHROMBIN TIME: CPT | Performed by: INTERNAL MEDICINE

## 2019-01-30 PROCEDURE — 85025 COMPLETE CBC W/AUTO DIFF WBC: CPT | Performed by: INTERNAL MEDICINE

## 2019-01-30 PROCEDURE — 25000128 H RX IP 250 OP 636: Performed by: INTERNAL MEDICINE

## 2019-01-30 PROCEDURE — 96401 CHEMO ANTI-NEOPL SQ/IM: CPT

## 2019-01-30 RX ORDER — HEPARIN SODIUM (PORCINE) LOCK FLUSH IV SOLN 100 UNIT/ML 100 UNIT/ML
500 SOLUTION INTRAVENOUS EVERY 8 HOURS
Status: DISCONTINUED | OUTPATIENT
Start: 2019-01-30 | End: 2019-01-30 | Stop reason: HOSPADM

## 2019-01-30 RX ORDER — HEPARIN SODIUM (PORCINE) LOCK FLUSH IV SOLN 100 UNIT/ML 100 UNIT/ML
500 SOLUTION INTRAVENOUS EVERY 8 HOURS
Status: CANCELLED
Start: 2019-01-30

## 2019-01-30 RX ADMIN — BORTEZOMIB 2.1 MG: 3.5 INJECTION, POWDER, LYOPHILIZED, FOR SOLUTION INTRAVENOUS; SUBCUTANEOUS at 09:01

## 2019-01-30 RX ADMIN — HEPARIN SODIUM (PORCINE) LOCK FLUSH IV SOLN 100 UNIT/ML 500 UNITS: 100 SOLUTION at 09:06

## 2019-01-30 ASSESSMENT — PAIN SCALES - GENERAL: PAINLEVEL: NO PAIN (0)

## 2019-01-30 NOTE — TELEPHONE ENCOUNTER
Reached patient.  She will resume her previous dosing schedule of 2 mg F, 4 mg ROW and recheck INR at the infusion center in 1 week.  Tigist Corral RN

## 2019-01-30 NOTE — TELEPHONE ENCOUNTER
Panel Management Review      Patient has the following on her problem list:     Hypertension   Last three blood pressure readings:  BP Readings from Last 3 Encounters:   01/30/19 121/77   01/25/19 148/74   01/16/19 121/79     Blood pressure: Passed    HTN Guidelines:  Age 18-59 BP range:  Less than 140/90  Age 60-85 with Diabetes:  Less than 140/90  Age 60-85 without Diabetes:  less than 150/90      Composite cancer screening  Chart review shows that this patient is due/due soon for the following None  Summary:    Patient is due/failing the following:   none    Action needed:   none    Type of outreach:    none    Questions for provider review:    None                                                                                                                                    Camila Pack CMA       Chart routed to Care Team .

## 2019-01-30 NOTE — PROGRESS NOTES
ANTICOAGULATION FOLLOW-UP CLINIC VISIT    Patient Name:  Amira Arreola  Date:  1/30/2019  Contact Type:  Telephone    SUBJECTIVE:        OBJECTIVE    INR   Date Value Ref Range Status   01/30/2019 3.09 (H) 0.86 - 1.14 Final       ASSESSMENT / PLAN  INR assessment THER    Recheck INR In: 1 WEEK    INR Location Clinic      Anticoagulation Summary  As of 1/30/2019    INR goal:   2.0-3.0   TTR:   68.4 % (2.6 y)   INR used for dosing:   3.09! (1/30/2019)   Warfarin maintenance plan:   2 mg (4 mg x 0.5) every Fri; 4 mg (4 mg x 1) all other days   Full warfarin instructions:   2 mg every Fri; 4 mg all other days   Weekly warfarin total:   26 mg   No change documented:   Aruna Fajardo RN   Plan last modified:   Tigist Corral RN (10/25/2018)   Next INR check:   2/6/2019   Target end date:   Indefinite    Indications    Long-term (current) use of anticoagulants [Z79.01] [Z79.01]  Atrial fibrillation (H) [I48.91] (Resolved) [I48.91]             Anticoagulation Episode Summary     INR check location:       Preferred lab:       Send INR reminders to:    ANTICOAGULATION    Comments:    INR to be drawn at infusion visit OR home care nurse. Watch result notes. Patient can be reached at home phone [phone]. Do not leave dosing with spouse.   Pt advised to call for triage if not hearing back from us for dosing.      Anticoagulation Care Providers     Provider Role Specialty Phone number    Addy Frias MD [facility] Internal Medicine [phone]            See the Encounter Report to view Anticoagulation Flowsheet and Dosing Calendar (Go to Encounters tab in chart review, and find the Anticoagulation Therapy Visit)    Dosage adjustment made based on physician directed care plan.  Reached patient. She will resume her previous dosing regimen of 2 mg F, 4 mg ROW and recheck in 7-10 days at infusion center.      Tigist Corral RN

## 2019-01-30 NOTE — PROGRESS NOTES
Infusion Nursing Note:  Amira Arreola presents today for C22D15 Velcade.    Patient seen by provider today: No    Note: Patient reports feeling tired today and has no other new concerns today..    Intravenous Access:  Implanted Port.      Treatment Conditions:  Lab Results   Component Value Date    HGB 11.5 01/30/2019     Lab Results   Component Value Date    WBC 5.5 01/30/2019      Lab Results   Component Value Date    ANEU 5.0 01/30/2019     Lab Results   Component Value Date     01/30/2019      Results reviewed, labs MET treatment parameters, ok to proceed with treatment.      Post Infusion Assessment:  Patient tolerated one Velade injection without incident to LLQ of the abdomen.  Site patent and intact, free from redness, edema or discomfort.  No evidence of extravasations.  Access discontinued per protocol.    Discharge Plan:   Patient declined prescription refills.  Discharge instructions reviewed with: Patient and Family.  Patient and/or family verbalized understanding of discharge instructions and all questions answered.  Copy of AVS reviewed with patient and/or family.  Patient will return 2/13/19 for next appointment.  Patient discharged in stable condition accompanied by: daughter.  Departure Mode: Ambulatory.    Libby Nguyễn RN

## 2019-01-30 NOTE — TELEPHONE ENCOUNTER
See lab/open [facility] encounter     Called patient to discuss INR results/warfarin.     Spouse answered and states to try calling patient in the afternoon - pt not home currently    Aruna ROBERSON RN

## 2019-02-13 ENCOUNTER — HOSPITAL ENCOUNTER (OUTPATIENT)
Facility: CLINIC | Age: 84
Setting detail: SPECIMEN
Discharge: HOME | End: 2019-02-13
Attending: INTERNAL MEDICINE | Admitting: INTERNAL MEDICINE
Payer: MEDICARE

## 2019-02-13 ENCOUNTER — ANTICOAGULATION THERAPY VISIT (OUTPATIENT)
Dept: FAMILY MEDICINE | Facility: CLINIC | Age: 84
End: 2019-02-13
Payer: MEDICARE

## 2019-02-13 ENCOUNTER — ONCOLOGY VISIT (OUTPATIENT)
Dept: ONCOLOGY | Facility: CLINIC | Age: 84
End: 2019-02-13
Attending: INTERNAL MEDICINE
Payer: MEDICARE

## 2019-02-13 ENCOUNTER — INFUSION THERAPY VISIT (OUTPATIENT)
Dept: INFUSION THERAPY | Facility: CLINIC | Age: 84
End: 2019-02-13
Attending: INTERNAL MEDICINE
Payer: MEDICARE

## 2019-02-13 VITALS
OXYGEN SATURATION: 100 % | BODY MASS INDEX: 21.6 KG/M2 | DIASTOLIC BLOOD PRESSURE: 100 MMHG | WEIGHT: 134.4 LBS | HEIGHT: 66 IN | HEART RATE: 71 BPM | SYSTOLIC BLOOD PRESSURE: 177 MMHG | TEMPERATURE: 97.7 F

## 2019-02-13 VITALS
SYSTOLIC BLOOD PRESSURE: 154 MMHG | RESPIRATION RATE: 16 BRPM | WEIGHT: 134 LBS | HEART RATE: 71 BPM | TEMPERATURE: 97.7 F | OXYGEN SATURATION: 100 % | BODY MASS INDEX: 21.53 KG/M2 | HEIGHT: 66 IN | DIASTOLIC BLOOD PRESSURE: 82 MMHG

## 2019-02-13 DIAGNOSIS — C90.00 MULTIPLE MYELOMA NOT HAVING ACHIEVED REMISSION (H): ICD-10-CM

## 2019-02-13 DIAGNOSIS — C90.00 MULTIPLE MYELOMA NOT HAVING ACHIEVED REMISSION (H): Primary | ICD-10-CM

## 2019-02-13 DIAGNOSIS — I48.0 PAROXYSMAL ATRIAL FIBRILLATION (H): ICD-10-CM

## 2019-02-13 LAB
ALBUMIN SERPL-MCNC: 3.3 G/DL (ref 3.4–5)
ALP SERPL-CCNC: 50 U/L (ref 40–150)
ALT SERPL W P-5'-P-CCNC: 23 U/L (ref 0–50)
AST SERPL W P-5'-P-CCNC: 19 U/L (ref 0–45)
BASOPHILS # BLD AUTO: 0 10E9/L (ref 0–0.2)
BASOPHILS NFR BLD AUTO: 0 %
BILIRUB DIRECT SERPL-MCNC: 0.1 MG/DL (ref 0–0.2)
BILIRUB SERPL-MCNC: 0.5 MG/DL (ref 0.2–1.3)
DIFFERENTIAL METHOD BLD: ABNORMAL
EOSINOPHIL # BLD AUTO: 0 10E9/L (ref 0–0.7)
EOSINOPHIL NFR BLD AUTO: 0.2 %
ERYTHROCYTE [DISTWIDTH] IN BLOOD BY AUTOMATED COUNT: 14.8 % (ref 10–15)
HCT VFR BLD AUTO: 32.7 % (ref 35–47)
HGB BLD-MCNC: 10.7 G/DL (ref 11.7–15.7)
IMM GRANULOCYTES # BLD: 0 10E9/L (ref 0–0.4)
IMM GRANULOCYTES NFR BLD: 0.3 %
INR PPP: 4.94
INR PPP: 4.94 (ref 0.86–1.14)
KAPPA LC UR-MCNC: 2.19 MG/DL (ref 0.33–1.94)
KAPPA LC/LAMBDA SER: 3.48 {RATIO} (ref 0.26–1.65)
LAMBDA LC SERPL-MCNC: 0.63 MG/DL (ref 0.57–2.63)
LYMPHOCYTES # BLD AUTO: 1 10E9/L (ref 0.8–5.3)
LYMPHOCYTES NFR BLD AUTO: 10.2 %
MCH RBC QN AUTO: 31.8 PG (ref 26.5–33)
MCHC RBC AUTO-ENTMCNC: 32.7 G/DL (ref 31.5–36.5)
MCV RBC AUTO: 97 FL (ref 78–100)
MONOCYTES # BLD AUTO: 1.4 10E9/L (ref 0–1.3)
MONOCYTES NFR BLD AUTO: 14.5 %
NEUTROPHILS # BLD AUTO: 7.3 10E9/L (ref 1.6–8.3)
NEUTROPHILS NFR BLD AUTO: 74.8 %
NRBC # BLD AUTO: 0 10*3/UL
NRBC BLD AUTO-RTO: 0 /100
PLATELET # BLD AUTO: 180 10E9/L (ref 150–450)
PROT SERPL-MCNC: 5.8 G/DL (ref 6.8–8.8)
RBC # BLD AUTO: 3.37 10E12/L (ref 3.8–5.2)
WBC # BLD AUTO: 9.8 10E9/L (ref 4–11)

## 2019-02-13 PROCEDURE — 80076 HEPATIC FUNCTION PANEL: CPT | Performed by: INTERNAL MEDICINE

## 2019-02-13 PROCEDURE — 83883 ASSAY NEPHELOMETRY NOT SPEC: CPT | Performed by: INTERNAL MEDICINE

## 2019-02-13 PROCEDURE — 96413 CHEMO IV INFUSION 1 HR: CPT

## 2019-02-13 PROCEDURE — 96401 CHEMO ANTI-NEOPL SQ/IM: CPT

## 2019-02-13 PROCEDURE — 85610 PROTHROMBIN TIME: CPT | Performed by: INTERNAL MEDICINE

## 2019-02-13 PROCEDURE — 00000402 ZZHCL STATISTIC TOTAL PROTEIN: Performed by: INTERNAL MEDICINE

## 2019-02-13 PROCEDURE — 25000132 ZZH RX MED GY IP 250 OP 250 PS 637: Mod: GY | Performed by: INTERNAL MEDICINE

## 2019-02-13 PROCEDURE — 84165 PROTEIN E-PHORESIS SERUM: CPT | Performed by: INTERNAL MEDICINE

## 2019-02-13 PROCEDURE — 96367 TX/PROPH/DG ADDL SEQ IV INF: CPT

## 2019-02-13 PROCEDURE — 85025 COMPLETE CBC W/AUTO DIFF WBC: CPT | Performed by: INTERNAL MEDICINE

## 2019-02-13 PROCEDURE — 99207 ZZC NO CHARGE NURSE ONLY: CPT | Performed by: INTERNAL MEDICINE

## 2019-02-13 PROCEDURE — A9270 NON-COVERED ITEM OR SERVICE: HCPCS | Mod: GY | Performed by: INTERNAL MEDICINE

## 2019-02-13 PROCEDURE — 25000128 H RX IP 250 OP 636: Mod: JW | Performed by: INTERNAL MEDICINE

## 2019-02-13 PROCEDURE — 99214 OFFICE O/P EST MOD 30 MIN: CPT | Performed by: INTERNAL MEDICINE

## 2019-02-13 PROCEDURE — 96415 CHEMO IV INFUSION ADDL HR: CPT

## 2019-02-13 PROCEDURE — 25800030 ZZH RX IP 258 OP 636: Performed by: INTERNAL MEDICINE

## 2019-02-13 RX ORDER — LATANOPROST 50 UG/ML
1 SOLUTION/ DROPS OPHTHALMIC DAILY
Refills: 6 | COMMUNITY
Start: 2019-02-06 | End: 2019-06-28

## 2019-02-13 RX ORDER — HEPARIN SODIUM (PORCINE) LOCK FLUSH IV SOLN 100 UNIT/ML 100 UNIT/ML
5 SOLUTION INTRAVENOUS EVERY 8 HOURS
Status: CANCELLED
Start: 2019-02-27

## 2019-02-13 RX ORDER — HEPARIN SODIUM (PORCINE) LOCK FLUSH IV SOLN 100 UNIT/ML 100 UNIT/ML
5 SOLUTION INTRAVENOUS EVERY 8 HOURS
Status: DISCONTINUED | OUTPATIENT
Start: 2019-02-13 | End: 2019-02-13 | Stop reason: HOSPADM

## 2019-02-13 RX ORDER — HEPARIN SODIUM (PORCINE) LOCK FLUSH IV SOLN 100 UNIT/ML 100 UNIT/ML
5 SOLUTION INTRAVENOUS EVERY 8 HOURS
Status: CANCELLED
Start: 2019-02-13

## 2019-02-13 RX ORDER — ALBUTEROL SULFATE 0.83 MG/ML
2.5 SOLUTION RESPIRATORY (INHALATION)
Status: CANCELLED | OUTPATIENT
Start: 2019-02-27

## 2019-02-13 RX ORDER — ACETAMINOPHEN 325 MG/1
650 TABLET ORAL ONCE
Status: COMPLETED | OUTPATIENT
Start: 2019-02-13 | End: 2019-02-13

## 2019-02-13 RX ORDER — ACETAMINOPHEN 325 MG/1
650 TABLET ORAL ONCE
Status: CANCELLED | OUTPATIENT
Start: 2019-02-13

## 2019-02-13 RX ORDER — EPINEPHRINE 1 MG/ML
0.3 INJECTION, SOLUTION INTRAMUSCULAR; SUBCUTANEOUS EVERY 5 MIN PRN
Status: CANCELLED | OUTPATIENT
Start: 2019-02-27

## 2019-02-13 RX ORDER — ALBUTEROL SULFATE 0.83 MG/ML
2.5 SOLUTION RESPIRATORY (INHALATION)
Status: CANCELLED | OUTPATIENT
Start: 2019-02-13

## 2019-02-13 RX ORDER — EPINEPHRINE 0.3 MG/.3ML
0.3 INJECTION SUBCUTANEOUS EVERY 5 MIN PRN
Status: CANCELLED | OUTPATIENT
Start: 2019-02-27

## 2019-02-13 RX ORDER — LORAZEPAM 2 MG/ML
0.5 INJECTION INTRAMUSCULAR EVERY 4 HOURS PRN
Status: CANCELLED
Start: 2019-02-27

## 2019-02-13 RX ORDER — DEXAMETHASONE 4 MG/1
TABLET ORAL
Qty: 28 TABLET | Refills: 0 | Status: SHIPPED | OUTPATIENT
Start: 2019-02-13 | End: 2019-04-01

## 2019-02-13 RX ORDER — DIPHENHYDRAMINE HYDROCHLORIDE 50 MG/ML
50 INJECTION INTRAMUSCULAR; INTRAVENOUS
Status: CANCELLED
Start: 2019-02-27

## 2019-02-13 RX ORDER — ALBUTEROL SULFATE 90 UG/1
1-2 AEROSOL, METERED RESPIRATORY (INHALATION)
Status: CANCELLED
Start: 2019-02-27

## 2019-02-13 RX ORDER — METHYLPREDNISOLONE SODIUM SUCCINATE 125 MG/2ML
125 INJECTION, POWDER, LYOPHILIZED, FOR SOLUTION INTRAMUSCULAR; INTRAVENOUS
Status: CANCELLED
Start: 2019-02-27

## 2019-02-13 RX ORDER — SODIUM CHLORIDE 9 MG/ML
1000 INJECTION, SOLUTION INTRAVENOUS CONTINUOUS PRN
Status: CANCELLED
Start: 2019-02-13

## 2019-02-13 RX ORDER — MEPERIDINE HYDROCHLORIDE 25 MG/ML
25 INJECTION INTRAMUSCULAR; INTRAVENOUS; SUBCUTANEOUS EVERY 30 MIN PRN
Status: CANCELLED | OUTPATIENT
Start: 2019-02-13

## 2019-02-13 RX ORDER — ALBUTEROL SULFATE 90 UG/1
1-2 AEROSOL, METERED RESPIRATORY (INHALATION)
Status: CANCELLED
Start: 2019-02-13

## 2019-02-13 RX ORDER — DIPHENHYDRAMINE HCL 25 MG
50 CAPSULE ORAL ONCE
Status: COMPLETED | OUTPATIENT
Start: 2019-02-13 | End: 2019-02-13

## 2019-02-13 RX ORDER — METHYLPREDNISOLONE SODIUM SUCCINATE 125 MG/2ML
125 INJECTION, POWDER, LYOPHILIZED, FOR SOLUTION INTRAMUSCULAR; INTRAVENOUS
Status: CANCELLED
Start: 2019-02-13

## 2019-02-13 RX ORDER — EPINEPHRINE 1 MG/ML
0.3 INJECTION, SOLUTION INTRAMUSCULAR; SUBCUTANEOUS EVERY 5 MIN PRN
Status: CANCELLED | OUTPATIENT
Start: 2019-02-13

## 2019-02-13 RX ORDER — MEPERIDINE HYDROCHLORIDE 25 MG/ML
25 INJECTION INTRAMUSCULAR; INTRAVENOUS; SUBCUTANEOUS EVERY 30 MIN PRN
Status: CANCELLED | OUTPATIENT
Start: 2019-02-27

## 2019-02-13 RX ORDER — DIPHENHYDRAMINE HCL 25 MG
50 CAPSULE ORAL ONCE
Status: CANCELLED | OUTPATIENT
Start: 2019-02-13

## 2019-02-13 RX ORDER — EPINEPHRINE 0.3 MG/.3ML
0.3 INJECTION SUBCUTANEOUS EVERY 5 MIN PRN
Status: CANCELLED | OUTPATIENT
Start: 2019-02-13

## 2019-02-13 RX ORDER — LORAZEPAM 2 MG/ML
0.5 INJECTION INTRAMUSCULAR EVERY 4 HOURS PRN
Status: CANCELLED
Start: 2019-02-13

## 2019-02-13 RX ORDER — OXYCODONE HYDROCHLORIDE 15 MG/1
15 TABLET ORAL EVERY 8 HOURS PRN
Qty: 90 TABLET | Refills: 0 | Status: ON HOLD | OUTPATIENT
Start: 2019-02-13 | End: 2019-03-28

## 2019-02-13 RX ORDER — SODIUM CHLORIDE 9 MG/ML
1000 INJECTION, SOLUTION INTRAVENOUS CONTINUOUS PRN
Status: CANCELLED
Start: 2019-02-27

## 2019-02-13 RX ORDER — DIPHENHYDRAMINE HYDROCHLORIDE 50 MG/ML
50 INJECTION INTRAMUSCULAR; INTRAVENOUS
Status: CANCELLED
Start: 2019-02-13

## 2019-02-13 RX ADMIN — DEXAMETHASONE SODIUM PHOSPHATE 12 MG: 10 INJECTION, SOLUTION INTRAMUSCULAR; INTRAVENOUS at 09:43

## 2019-02-13 RX ADMIN — SODIUM CHLORIDE, PRESERVATIVE FREE 5 ML: 5 INJECTION INTRAVENOUS at 11:58

## 2019-02-13 RX ADMIN — ACETAMINOPHEN 650 MG: 325 TABLET ORAL at 09:43

## 2019-02-13 RX ADMIN — BORTEZOMIB 2.1 MG: 3.5 INJECTION, POWDER, LYOPHILIZED, FOR SOLUTION INTRAVENOUS; SUBCUTANEOUS at 10:25

## 2019-02-13 RX ADMIN — SODIUM CHLORIDE 250 ML: 9 INJECTION, SOLUTION INTRAVENOUS at 09:42

## 2019-02-13 RX ADMIN — DARATUMUMAB 1000 MG: 100 INJECTION, SOLUTION, CONCENTRATE INTRAVENOUS at 10:20

## 2019-02-13 RX ADMIN — DIPHENHYDRAMINE HYDROCHLORIDE 50 MG: 25 CAPSULE ORAL at 09:43

## 2019-02-13 ASSESSMENT — PAIN SCALES - GENERAL
PAINLEVEL: NO PAIN (0)
PAINLEVEL: NO PAIN (0)

## 2019-02-13 ASSESSMENT — MIFFLIN-ST. JEOR
SCORE: 1066.13
SCORE: 1064.25

## 2019-02-13 NOTE — PROGRESS NOTES
"Oncology Rooming Note    February 13, 2019 9:09 AM   Amira Arreola is a 86 year old female who presents for:    Chief Complaint   Patient presents with     Oncology Clinic Visit     Multiple myeloma not having achieved remission (H)     Initial Vitals: /82   Pulse 71   Temp 97.7  F (36.5  C) (Oral)   Resp 16   Ht 1.676 m (5' 5.98\")   Wt 60.8 kg (134 lb)   SpO2 100%   BMI 21.64 kg/m   Estimated body mass index is 21.64 kg/m  as calculated from the following:    Height as of this encounter: 1.676 m (5' 5.98\").    Weight as of this encounter: 60.8 kg (134 lb). Body surface area is 1.68 meters squared.  No Pain (0) Comment: Data Unavailable   No LMP recorded. Patient is postmenopausal.  Allergies reviewed: Yes  Medications reviewed: Yes    Medications: Medication refills not needed today.  Pharmacy name entered into [facility]: [facility] [address] [place] [address] AT [facility] [address]    Clinical concerns: none     8 minutes for nursing intake (face to face time)     Mena Bardales CMA                  "

## 2019-02-13 NOTE — PATIENT INSTRUCTIONS
Take Decadron 4mg (1 tablet) on 2/14 and 2/15 post Darzalex infusion.    Per Coumadin clinic for INR 4.94, HOLD coumadin tonight.  Tomorrow 2/14, take 2mg Coumadin.  New coumadin schedule is as follows: Monday, Wednesday, Friday take 2mg Coumadin.  All remaining days, take 4mg Coumadin.  Appt at the Coumadin Clinic scheduled on 2/20/19 at 10:30am to have INR rechecked.    Please remember to take blood pressure medication as blood pressure was elevated at your appointment today.

## 2019-02-13 NOTE — PATIENT INSTRUCTIONS
Continue chemotherapy.  Scheduled/aida   Follow up in 1 month.  Scheduled/aida     Patient back in [facility].      AVS printed & given to patient/aida

## 2019-02-13 NOTE — Clinical Note
"    2/13/2019         RE: Amira Arreola  [address]  [place] [address]        Dear Colleague,    Thank you for referring your patient, Amira Arreola, to the [facility] CANCER CLINIC. Please see a copy of my visit note below.    Oncology Rooming Note    February 13, 2019 9:09 AM   Amira Arreola is a 86 year old female who presents for:    Chief Complaint   Patient presents with     Oncology Clinic Visit     Multiple myeloma not having achieved remission (H)     Initial Vitals: /82   Pulse 71   Temp 97.7  F (36.5  C) (Oral)   Resp 16   Ht 1.676 m (5' 5.98\")   Wt 60.8 kg (134 lb)   SpO2 100%   BMI 21.64 kg/m    Estimated body mass index is 21.64 kg/m  as calculated from the following:    Height as of this encounter: 1.676 m (5' 5.98\").    Weight as of this encounter: 60.8 kg (134 lb). Body surface area is 1.68 meters squared.  No Pain (0) Comment: Data Unavailable   No LMP recorded. Patient is postmenopausal.  Allergies reviewed: Yes  Medications reviewed: Yes    Medications: Medication refills not needed today.  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     1. On 09/21/2015, WBC of 4.2, hemoglobin of 13.2 and platelets of 138.    -On 09/29/2015, SPEP does not reveal any M-spike.   -On 10/02/2015, JANET does not reveal any monoclonal protein.     -On 10/22/2015, urine immunofixation reveals monoclonal free kappa light chain.    2. On 05/11/2016, kappa light chain of 50, lambda light chain of 0.32 and ratio of kappa to lambda of 156.2.  3. Bone marrow biopsy on 05/25/2016 reveals 40-50% kappa light chain restricted plasma cells.  Cytogenetics is normal. FISH " panel reveals translocation 11;14.    4. MRI of bones on 06/21/2016 and 06/22/2016 reveals myeloma lesions.  5. On 08/24/2016, she was started on revlimid with dexamethasone 20 mg weekly. She did not have any significant response to treatment.   6. Velcade and dexamethasone started on 03/21/2017.    7. Daratumumab added to velcade and dexamethasone on 05/31/2017.   -Velcade given every 14 days starting 08/01/2018.     SUBJECTIVE:  Mrs. Arreola is an 86-year-old female with kappa free light chain multiple myeloma.  She is on daratumumab, Velcade and dexamethasone.  We are giving Velcade every other week for convenience.      Overall, her condition is stable.  She has fatigue.  Some days her fatigue gets worse than others.  No headache.  No lightheadedness.  No chest pain.  No difficulty breathing.  No nausea or vomiting.  Appetite is good.  No urinary or bowel complaints.        Family has noted that patient is a little more forgetful.  She is not confused, just more forgetful.      Patient has chronic upper back pain.  She takes oxycodone which helps.      PHYSICAL EXAMINATION:   Alert and oriented x 3. ECOG PS of 2.  EYES:  No icterus.   THROAT:  No ulcer or thrush.   NECK:  Supple. No lymphadenopathy.   AXILLAE:  No lymphadenopathy.   LUNGS:  Good air entry bilaterally.  No crackles or wheezing.   HEART:  Regular.  No murmur.   ABDOMEN:  Soft and nontender.  No mass.   EXTREMITIES: Bilateral pedal edema. No calf swelling or tenderness.   SKIN: No petechia.     LABORATORY DATA:  Reviewed.      ASSESSMENT:   1.  An 86-year-old female with kappa free light chain multiple myeloma.   2.  Chronic back pain.   3.  Fatigue.   4.  Forgetfulness secondary to her age and chemotherapy.      PLAN:   1.  Patient overall is doing well from myeloma.  Her myeloma is stable.  [place] free light chain is remaining around 2.  It used to be 60.  She is on Velcade, daratumumab, and dexamethasone.  She is tolerating it well.  She will  continue on that.  Side effects reviewed.   2.  Patient also gets Zometa every 3 months.  That will be continued.  She does not have any jaw or dental related symptoms.   3.  Patient has some forgetfulness. It is due to her age and also due to chemo brain.  She is not confused.  If it gets worse, will have her see a neurologist.   4.  Patient is on warfarin.  INR is elevated.  She will contact the anticoagulation clinic.  I told her not to take any warfarin until she has spoken to anticoagulation nurse.     5.  Patient wanted a refill on oxycodone which was given.  I told her to use as few as possible.   6.  I will see her in a month for followup.  Advised her to call us with any questions or concerns in between.         ITZ LOPEZ MD             D: 2019   T: 2019   MT: ROXANA      Name:     ALBERTO PARSON   MRN:      [id number]        Account:      [id number]   :      1932           Visit Date:   2019      Document: [id number]        Again, thank you for allowing me to participate in the care of your patient.        Sincerely,        Itz Lopez MD

## 2019-02-13 NOTE — PROGRESS NOTES
Infusion Nursing Note:  Amira Arreola presents today for C23 D1 Darzalex/Velcade  Patient seen by provider today: Yes: Dr. Roberts    present during visit today: Not Applicable.    Note: Compression stockings and shoes removed due to patient/daughter's concern for some redness on her feet/ankles.  Per Dr. Roberts, patient has a fungal infection.  His recommendation to is use an over the counter athlete's foot spray and keep her feet/stockings clean.  David Kearney RN    Patient states she took her Decadron 20mg PO yesterday on accident.  Ok per pharmacy.  Patient given printed instructions to take additional decadron 4mg PO 2/14 & 2/15 post Darzalex per orders.  David Kearney RN    Patient's blood pressure elevated upon discharge.  Patient states she forgot to take her medication this morning.  RN reminded patient and daughter to take medications as prescribed.  David Kearney RN    Pt and daughter informed, per Coumadin clinic for INR 4.94, HOLD coumadin tonight.  Tomorrow 2/14, take 2mg Coumadin.  New coumadin schedule is as follows: Monday, Wednesday, Friday take 2mg Coumadin.  All remaining days, take 4mg Coumadin.  Appt at the Coumadin Clinic scheduled on 2/20/19 at 10:30am to have INR rechecked.    Intravenous Access:  Labs drawn without difficulty.  Implanted Port.    Treatment Conditions:  Lab Results   Component Value Date    HGB 10.7 02/13/2019     Lab Results   Component Value Date    WBC 9.8 02/13/2019      Lab Results   Component Value Date    ANEU 7.3 02/13/2019     Lab Results   Component Value Date     02/13/2019      Lab Results   Component Value Date     09/20/2018                   Lab Results   Component Value Date    POTASSIUM 3.8 09/20/2018           No results found for: MAG         Lab Results   Component Value Date    CR 0.79 01/16/2019                   Lab Results   Component Value Date    BRADLEY 9.5 01/16/2019                Lab Results   Component Value Date    BILITOTAL 0.5  02/13/2019           Lab Results   Component Value Date    ALBUMIN 3.3 02/13/2019                    Lab Results   Component Value Date    ALT 23 02/13/2019           Lab Results   Component Value Date    AST 19 02/13/2019       Results reviewed, labs MET treatment parameters, ok to proceed with treatment.      Post Infusion Assessment:  Patient tolerated infusion without incident.  Blood return noted pre and post infusion.  Site patent and intact, free from redness, edema or discomfort.  No evidence of extravasations.  Access discontinued per protocol.    Discharge Plan:   Prescription refills given for Decadron.  Discharge instructions reviewed with: Patient and Family.  Patient and/or family verbalized understanding of discharge instructions and all questions answered.  Copy of AVS reviewed with patient and/or family.  Patient will return 2/27/19 for next appointment.  Patient discharged in stable condition accompanied by: daughter.  Departure Mode: Ambulatory.    Reed Rey RN

## 2019-02-13 NOTE — PROGRESS NOTES
ANTICOAGULATION FOLLOW-UP CLINIC VISIT    Patient Name:  Amira Arreola  Date:  2/13/2019  Contact Type:  Telephone/ Infusion Center    SUBJECTIVE:     Patient Findings     Positives:   Unexplained INR or factor level change           OBJECTIVE    INR   Date Value Ref Range Status   02/13/2019 4.94 (H) 0.86 - 1.14 Final       ASSESSMENT / PLAN  INR assessment SUPRA    Recheck INR In: 1 WEEK    INR Location Clinic      Anticoagulation Summary  As of 2/13/2019    INR goal:   2.0-3.0   TTR:   67.4 % (2.7 y)   INR used for dosing:      Warfarin maintenance plan:   2 mg (4 mg x 0.5) every Mon, Wed, Fri; 4 mg (4 mg x 1) all other days   Full warfarin instructions:   2/13: Hold; 2/14: 2 mg; Otherwise 2 mg every Mon, Wed, Fri; 4 mg all other days   Weekly warfarin total:   22 mg   Plan last modified:   Bernarda Thomas RN (2/13/2019)   Next INR check:   2/20/2019   Target end date:   Indefinite    Indications    Long-term (current) use of anticoagulants [Z79.01] [Z79.01]  Atrial fibrillation (H) [I48.91] (Resolved) [I48.91]             Anticoagulation Episode Summary     INR check location:       Preferred lab:       Send INR reminders to:   [facility] ANTICOAGULATION    Comments:    INR to be drawn at infusion visit OR home care nurse. Watch result notes. Patient can be reached at home phone [phone]. Do not leave dosing with spouse.   Pt advised to call for triage if not hearing back from us for dosing.      Anticoagulation Care Providers     Provider Role Specialty Phone number    Addy Frias MD [facility] Internal Medicine [phone]            See the Encounter Report to view Anticoagulation Flowsheet and Dosing Calendar (Go to Encounters tab in chart review, and find the Anticoagulation Therapy Visit)    Pt is 4.94 today. Called pt on her cell phone. Pt was still at the infusion center. Pt's infusion nurse advised to have pt HOLD her warfarin tonight. Take 2 mg tomorrow 2/14/19 then 2 mg on  Mondays,Wednesdays and Fridays and 4 mg all the other days. Recheck in 1 week. Pt will be coming into the [facility] for INR check on 2/20/19 at 10:30 am. Pt's next infusion won't be until 2/27/19. Pt's INR needed to be checked sooner than that. Will continue to monitor.  Bernarda Thomas RN

## 2019-02-14 ENCOUNTER — TELEPHONE (OUTPATIENT)
Dept: FAMILY MEDICINE | Facility: CLINIC | Age: 84
End: 2019-02-14

## 2019-02-14 ENCOUNTER — ANTICOAGULATION THERAPY VISIT (OUTPATIENT)
Dept: FAMILY MEDICINE | Facility: CLINIC | Age: 84
End: 2019-02-14
Payer: MEDICARE

## 2019-02-14 LAB
ALBUMIN SERPL ELPH-MCNC: 3.5 G/DL (ref 3.7–5.1)
ALPHA1 GLOB SERPL ELPH-MCNC: 0.4 G/DL (ref 0.2–0.4)
ALPHA2 GLOB SERPL ELPH-MCNC: 0.7 G/DL (ref 0.5–0.9)
B-GLOBULIN SERPL ELPH-MCNC: 0.6 G/DL (ref 0.6–1)
GAMMA GLOB SERPL ELPH-MCNC: 0.2 G/DL (ref 0.7–1.6)
INR PPP: 4.4
M PROTEIN SERPL ELPH-MCNC: 0 G/DL
PROT PATTERN SERPL ELPH-IMP: ABNORMAL

## 2019-02-14 NOTE — PROGRESS NOTES
ANTICOAGULATION FOLLOW-UP CLINIC VISIT    Patient Name:  Amira Arreola  Date:  2019  Contact Type:  Telephone/ [facility] nurse    SUBJECTIVE:     Patient Findings     Comments:   Reason for Call:  INR    Who is calling?  Home Care:     Phone number:  [phone]    Fax number:      Name of caller: DAVIAN    INR Value:  4.4    Are there any other concerns:  Yes: SMALL SKIN TEAR ON RIGHT FOREARM    Can we leave a detailed message on this number? YES    Phone number patient can be reached at: Other phone number:        Call taken on 2019 at 12:21 PM by Drew Patle                 OBJECTIVE    INR   Date Value Ref Range Status   2019 4.4  Final       ASSESSMENT / PLAN  INR assessment SUPRA    Recheck INR In: 5 DAYS    INR Location Homecare INR      Anticoagulation Summary  As of 2019    INR goal:   2.0-3.0   TTR:   67.4 % (2.7 y)   INR used for dosin.4! (2019)   Warfarin maintenance plan:   2 mg (4 mg x 0.5) every Mon, Wed, Fri; 4 mg (4 mg x 1) all other days   Full warfarin instructions:   : Hold; Otherwise 2 mg every Mon, Wed, Fri; 4 mg all other days   Weekly warfarin total:   22 mg   Plan last modified:   Bernarda Thomas RN (2019)   Next INR check:   2019   Target end date:   Indefinite    Indications    Long-term (current) use of anticoagulants [Z79.01] [Z79.01]  Atrial fibrillation (H) [I48.91] (Resolved) [I48.91]             Anticoagulation Episode Summary     INR check location:       Preferred lab:       Send INR reminders to:    ANTICOAGULATION    Comments:    INR to be drawn at infusion visit OR home care nurse. Watch result notes. Patient can be reached at home phone [phone]. Do not leave dosing with spouse.   Pt advised to call for triage if not hearing back from us for dosing.      Anticoagulation Care Providers     Provider Role Specialty Phone number    Addy Frias MD [facility] Internal Medicine [phone]            See the  Encounter Report to view Anticoagulation Flowsheet and Dosing Calendar (Go to Encounters tab in chart review, and find the Anticoagulation Therapy Visit)    Dosage adjustment made based on physician directed care plan.    HC nurse called today with INR results.   Orders given: Warfarin dosing and next INR date.  See flowsheet.   Next INR will be done by HC.  Next week's INR appointment in clinic cancelled.       Bri Mcbride RN

## 2019-02-14 NOTE — TELEPHONE ENCOUNTER
Reason for Call:  INR    Who is calling?  Home Care:     Phone number:  [phone]    Fax number:      Name of caller: DAVIAN    INR Value:  4.4    Are there any other concerns:  Yes: SMALL SKIN TEAR ON RIGHT FOREARM    Can we leave a detailed message on this number? YES    Phone number patient can be reached at: Other phone number:        Call taken on 2/14/2019 at 12:21 PM by Drew Patel

## 2019-02-14 NOTE — PROGRESS NOTES
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     1. On 09/21/2015, WBC of 4.2, hemoglobin of 13.2 and platelets of 138.    -On 09/29/2015, SPEP does not reveal any M-spike.   -On 10/02/2015, JANET does not reveal any monoclonal protein.     -On 10/22/2015, urine immunofixation reveals monoclonal free kappa light chain.    2. On 05/11/2016, kappa light chain of 50, lambda light chain of 0.32 and ratio of kappa to lambda of 156.2.  3. Bone marrow biopsy on 05/25/2016 reveals 40-50% kappa light chain restricted plasma cells.  Cytogenetics is normal. FISH panel reveals translocation 11;14.    4. MRI of bones on 06/21/2016 and 06/22/2016 reveals myeloma lesions.  5. On 08/24/2016, she was started on revlimid with dexamethasone 20 mg weekly. She did not have any significant response to treatment.   6. Velcade and dexamethasone started on 03/21/2017.    7. Daratumumab added to velcade and dexamethasone on 05/31/2017.   -Velcade given every 14 days starting 08/01/2018.     SUBJECTIVE:  Mrs. Arreola is an 86-year-old female with kappa free light chain multiple myeloma.  She is on daratumumab, Velcade and dexamethasone.  We are giving Velcade every other week for convenience.      Overall, her condition is stable.  She has fatigue.  Some days her fatigue gets worse than others.  No headache.  No lightheadedness.  No chest pain.  No difficulty breathing.  No nausea or vomiting.  Appetite is good.  No urinary or bowel complaints.        Family has noted that patient is a little more forgetful.  She is not confused, just more forgetful.      Patient has chronic upper back pain.  She takes oxycodone which helps.      PHYSICAL EXAMINATION:   Alert and oriented x 3. ECOG PS of 2.  EYES:  No icterus.   THROAT:  No ulcer or thrush.   NECK:  Supple. No lymphadenopathy.   AXILLAE:  No lymphadenopathy.   LUNGS:  Good air entry bilaterally.  No crackles  or wheezing.   HEART:  Regular.  No murmur.   ABDOMEN:  Soft and nontender.  No mass.   EXTREMITIES: Bilateral pedal edema. No calf swelling or tenderness.   SKIN: No petechia.     LABORATORY DATA:  Reviewed.      ASSESSMENT:   1.  An 86-year-old female with kappa free light chain multiple myeloma.   2.  Chronic back pain.   3.  Fatigue.   4.  Forgetfulness secondary to her age and chemotherapy.      PLAN:   1.  Patient overall is doing well from myeloma.  Her myeloma is stable.  [place] free light chain is remaining around 2.  It used to be 60.  She is on Velcade, daratumumab, and dexamethasone.  She is tolerating it well.  She will continue on that.  Side effects reviewed.   2.  Patient also gets Zometa every 3 months.  That will be continued.  She does not have any jaw or dental related symptoms.   3.  Patient has some forgetfulness. It is due to her age and also due to chemo brain.  She is not confused.  If it gets worse, will have her see a neurologist.   4.  Patient is on warfarin.  INR is elevated.  She will contact the anticoagulation clinic.  I told her not to take any warfarin until she has spoken to anticoagulation nurse.     5.  Patient wanted a refill on oxycodone which was given.  I told her to use as few as possible.   6.  I will see her in a month for followup.  Advised her to call us with any questions or concerns in between.         ITZ LOPEZ MD             D: 2019   T: 2019   MT: ROXANA      Name:     ALBERTO PARSON   MRN:      [id number]        Account:      [id number]   :      1932           Visit Date:   2019      Document: [id number]

## 2019-02-19 ENCOUNTER — ANTICOAGULATION THERAPY VISIT (OUTPATIENT)
Dept: FAMILY MEDICINE | Facility: CLINIC | Age: 84
End: 2019-02-19
Payer: MEDICARE

## 2019-02-19 ENCOUNTER — TELEPHONE (OUTPATIENT)
Dept: FAMILY MEDICINE | Facility: CLINIC | Age: 84
End: 2019-02-19

## 2019-02-19 LAB — INR PPP: 1.9

## 2019-02-19 NOTE — TELEPHONE ENCOUNTER
Reason for Call:  INR    Who is calling?  Home Care: [facility]    Phone number:  [phone]    Fax number:  -    Name of caller: Senia    INR Value:  1.9    Are there any other concerns:  Yes: she had a fall over the weekend  And would like a Order for PT in the Home as well, patient is agreeing to  This as well    Can we leave a detailed message on this number? YES          Call taken on 2/19/2019 at 12:48 PM by Pacheco Villagran

## 2019-02-19 NOTE — PROGRESS NOTES
ANTICOAGULATION FOLLOW-UP CLINIC VISIT    Patient Name:  Amira Arreola  Date:  2019  Contact Type:  Telephone    SUBJECTIVE:     Patient Findings     Positives:   Activity level change (fell over weekend)           OBJECTIVE    INR   Date Value Ref Range Status   2019 1.9  Final       ASSESSMENT / PLAN  INR assessment THER    Recheck INR In: 5 DAYS    INR Location Homecare INR      Anticoagulation Summary  As of 2019    INR goal:   2.0-3.0   TTR:   67.2 % (2.7 y)   INR used for dosin.9! (2019)   Warfarin maintenance plan:   4 mg (4 mg x 1) every Sun, Tue, Thu; 2 mg (4 mg x 0.5) all other days   Full warfarin instructions:   4 mg every Sun, Tue, Thu; 2 mg all other days   Weekly warfarin total:   20 mg   Plan last modified:   Tigist Corral RN (2019)   Next INR check:   2019   Target end date:   Indefinite    Indications    Long-term (current) use of anticoagulants [Z79.01] [Z79.01]  Atrial fibrillation (H) [I48.91] (Resolved) [I48.91]             Anticoagulation Episode Summary     INR check location:       Preferred lab:       Send INR reminders to:    ANTICOAGULATION    Comments:    INR to be drawn at infusion visit OR home care nurse. Watch result notes. Patient can be reached at home phone [phone]. Do not leave dosing with spouse.   Pt advised to call for triage if not hearing back from us for dosing.      Anticoagulation Care Providers     Provider Role Specialty Phone number    Addy Frias MD [facility] Internal Medicine [phone]            See the Encounter Report to view Anticoagulation Flowsheet and Dosing Calendar (Go to Encounters tab in chart review, and find the Anticoagulation Therapy Visit)    Dosage adjustment made based on physician directed care plan.  INR 1.9 reported by home care.  Returned call and left VM. 4 mg SuTuTh, 2 mg ROW and recheck INR in 5 days.    Tigist Corral RN

## 2019-02-19 NOTE — TELEPHONE ENCOUNTER
Okay for orders below.  Left message for Senia.  Also advised dosing instructions for warfarin.  Tigist Corral RN

## 2019-02-27 ENCOUNTER — INFUSION THERAPY VISIT (OUTPATIENT)
Dept: INFUSION THERAPY | Facility: CLINIC | Age: 84
End: 2019-02-27
Attending: INTERNAL MEDICINE
Payer: MEDICARE

## 2019-02-27 ENCOUNTER — HOSPITAL ENCOUNTER (OUTPATIENT)
Facility: CLINIC | Age: 84
Setting detail: SPECIMEN
Discharge: HOME | End: 2019-02-27
Attending: INTERNAL MEDICINE | Admitting: INTERNAL MEDICINE
Payer: MEDICARE

## 2019-02-27 ENCOUNTER — TELEPHONE (OUTPATIENT)
Dept: FAMILY MEDICINE | Facility: CLINIC | Age: 84
End: 2019-02-27

## 2019-02-27 ENCOUNTER — ANTICOAGULATION THERAPY VISIT (OUTPATIENT)
Dept: FAMILY MEDICINE | Facility: CLINIC | Age: 84
End: 2019-02-27
Payer: MEDICARE

## 2019-02-27 VITALS
RESPIRATION RATE: 16 BRPM | BODY MASS INDEX: 22.61 KG/M2 | TEMPERATURE: 97.6 F | OXYGEN SATURATION: 100 % | WEIGHT: 140 LBS | SYSTOLIC BLOOD PRESSURE: 113 MMHG | DIASTOLIC BLOOD PRESSURE: 73 MMHG | HEART RATE: 53 BPM

## 2019-02-27 DIAGNOSIS — I48.0 PAROXYSMAL ATRIAL FIBRILLATION (H): ICD-10-CM

## 2019-02-27 DIAGNOSIS — C90.00 MULTIPLE MYELOMA NOT HAVING ACHIEVED REMISSION (H): Primary | ICD-10-CM

## 2019-02-27 LAB
BASOPHILS # BLD AUTO: 0 10E9/L (ref 0–0.2)
BASOPHILS NFR BLD AUTO: 0.1 %
DIFFERENTIAL METHOD BLD: ABNORMAL
EOSINOPHIL # BLD AUTO: 0.1 10E9/L (ref 0–0.7)
EOSINOPHIL NFR BLD AUTO: 1.8 %
ERYTHROCYTE [DISTWIDTH] IN BLOOD BY AUTOMATED COUNT: 15.2 % (ref 10–15)
HCT VFR BLD AUTO: 35.5 % (ref 35–47)
HGB BLD-MCNC: 11.5 G/DL (ref 11.7–15.7)
IMM GRANULOCYTES # BLD: 0 10E9/L (ref 0–0.4)
IMM GRANULOCYTES NFR BLD: 0.1 %
INR PPP: 2.25 (ref 0.86–1.14)
LYMPHOCYTES # BLD AUTO: 1.2 10E9/L (ref 0.8–5.3)
LYMPHOCYTES NFR BLD AUTO: 16.7 %
MCH RBC QN AUTO: 32.2 PG (ref 26.5–33)
MCHC RBC AUTO-ENTMCNC: 32.4 G/DL (ref 31.5–36.5)
MCV RBC AUTO: 99 FL (ref 78–100)
MONOCYTES # BLD AUTO: 1.3 10E9/L (ref 0–1.3)
MONOCYTES NFR BLD AUTO: 17 %
NEUTROPHILS # BLD AUTO: 4.7 10E9/L (ref 1.6–8.3)
NEUTROPHILS NFR BLD AUTO: 64.3 %
NRBC # BLD AUTO: 0 10*3/UL
NRBC BLD AUTO-RTO: 0 /100
PLATELET # BLD AUTO: 179 10E9/L (ref 150–450)
RBC # BLD AUTO: 3.57 10E12/L (ref 3.8–5.2)
WBC # BLD AUTO: 7.4 10E9/L (ref 4–11)

## 2019-02-27 PROCEDURE — 85610 PROTHROMBIN TIME: CPT | Performed by: INTERNAL MEDICINE

## 2019-02-27 PROCEDURE — 85025 COMPLETE CBC W/AUTO DIFF WBC: CPT | Performed by: INTERNAL MEDICINE

## 2019-02-27 PROCEDURE — 96402 CHEMO HORMON ANTINEOPL SQ/IM: CPT

## 2019-02-27 PROCEDURE — 25000128 H RX IP 250 OP 636: Mod: JW | Performed by: INTERNAL MEDICINE

## 2019-02-27 RX ADMIN — BORTEZOMIB 2.1 MG: 3.5 INJECTION, POWDER, LYOPHILIZED, FOR SOLUTION INTRAVENOUS; SUBCUTANEOUS at 10:14

## 2019-02-27 ASSESSMENT — PAIN SCALES - GENERAL: PAINLEVEL: NO PAIN (0)

## 2019-02-27 NOTE — PROGRESS NOTES
ANTICOAGULATION FOLLOW-UP CLINIC VISIT    Patient Name:  Amira Arreola  Date:  2019  Contact Type:  Telephone/ Home Care/Patient    SUBJECTIVE:     Patient Findings     Positives:   No Problem Findings           OBJECTIVE    INR   Date Value Ref Range Status   2019 2.25 (H) 0.86 - 1.14 Final       ASSESSMENT / PLAN  INR assessment THER    Recheck INR In: 1 WEEK    INR Location Clinic      Anticoagulation Summary  As of 2019    INR goal:   2.0-3.0   TTR:   67.2 % (2.7 y)   INR used for dosin.25 (2019)   Warfarin maintenance plan:   4 mg (4 mg x 1) every Sun, Tue, Thu; 2 mg (4 mg x 0.5) all other days   Full warfarin instructions:   4 mg every Sun, Tue, Thu; 2 mg all other days   Weekly warfarin total:   20 mg   No change documented:   Aruna Fajardo RN   Plan last modified:   Tigist Corral RN (2019)   Next INR check:   3/6/2019   Target end date:   Indefinite    Indications    Long-term (current) use of anticoagulants [Z79.01] [Z79.01]  Atrial fibrillation (H) [I48.91] (Resolved) [I48.91]             Anticoagulation Episode Summary     INR check location:       Preferred lab:       Send INR reminders to:    ANTICOAGULATION    Comments:    INR to be drawn at infusion visit OR home care nurse. Watch result notes. Patient can be reached at home phone [phone]. Do not leave dosing with spouse.   Pt advised to call for triage if not hearing back from us for dosing.      Anticoagulation Care Providers     Provider Role Specialty Phone number    Addy Frias MD [facility] Internal Medicine [phone]            See the Encounter Report to view Anticoagulation Flowsheet and Dosing Calendar (Go to Encounters tab in chart review, and find the Anticoagulation Therapy Visit)    Pt was 2.25 per lab. Pt contacted by phone and advised to continue maintenance dose of 4 mg every , Tuesday, Thursday and 2 mg all the other days. Pt states that Home Care will be back to check  INR on or around 3/6/19. Pt reports that she has requested a Home Health Aid from her Home Care nurse to help she and her  with hygiene needs. Pt denies any diet or medication changes. Amira and Home Care aware if signs of clotting (pain, tenderness, swelling, color change in leg or arm, SOB) and bleeding occur (blood in stool, urine, large bruising, bleeding gums, nosebleeds) to have INR check sooner. If sx severe report to ER or concerned for stroke call 911. If general questions or concerns arise, call clinic.         Bernarda Thomas RN

## 2019-02-27 NOTE — TELEPHONE ENCOUNTER
INR results in chart from this morning   [facility] encounter is open    Tried calling pt,  states she should be back in later today in the afternoon. Will need to recall    INR   Date Value Ref Range Status   02/27/2019 2.25 (H) 0.86 - 1.14 Final      Aruna ROBERSON RN

## 2019-02-27 NOTE — PROGRESS NOTES
Infusion Nursing Note:  Amira Arreola presents today for Velcade C23D15.    Patient seen by provider today: No   present during visit today: Not Applicable.    Note: N/A.    Intravenous Access:  Labs drawn without difficulty.      Treatment Conditions:  Lab Results   Component Value Date    HGB 11.5 02/27/2019     Lab Results   Component Value Date    WBC 7.4 02/27/2019      Lab Results   Component Value Date    ANEU 4.7 02/27/2019     Lab Results   Component Value Date     02/27/2019      Results reviewed, labs MET treatment parameters, ok to proceed with treatment.      Post Infusion Assessment:  Patient tolerated injection without incident.    Discharge Plan:   Discharge instructions reviewed with: Patient.  Patient and/or family verbalized understanding of discharge instructions and all questions answered.  Patient discharged in stable condition accompanied by: self.  Departure Mode: Ambulatory.    Yesi Chatterjee RN

## 2019-03-01 ENCOUNTER — OFFICE VISIT (OUTPATIENT)
Dept: FAMILY MEDICINE | Facility: CLINIC | Age: 84
End: 2019-03-01
Payer: MEDICARE

## 2019-03-01 VITALS
DIASTOLIC BLOOD PRESSURE: 82 MMHG | BODY MASS INDEX: 23.82 KG/M2 | SYSTOLIC BLOOD PRESSURE: 134 MMHG | TEMPERATURE: 97.8 F | OXYGEN SATURATION: 96 % | HEART RATE: 100 BPM | WEIGHT: 143 LBS | HEIGHT: 65 IN

## 2019-03-01 DIAGNOSIS — C90.00 MULTIPLE MYELOMA NOT HAVING ACHIEVED REMISSION (H): ICD-10-CM

## 2019-03-01 DIAGNOSIS — C79.51 CANCER, METASTATIC TO BONE (H): ICD-10-CM

## 2019-03-01 DIAGNOSIS — S61.411D LACERATION OF RIGHT HAND WITHOUT FOREIGN BODY, SUBSEQUENT ENCOUNTER: ICD-10-CM

## 2019-03-01 DIAGNOSIS — R29.6 RECURRENT FALLS: ICD-10-CM

## 2019-03-01 DIAGNOSIS — I48.0 PAROXYSMAL ATRIAL FIBRILLATION (H): Primary | ICD-10-CM

## 2019-03-01 PROCEDURE — 99214 OFFICE O/P EST MOD 30 MIN: CPT | Performed by: INTERNAL MEDICINE

## 2019-03-01 RX ORDER — METOPROLOL SUCCINATE 100 MG/1
100 TABLET, EXTENDED RELEASE ORAL 2 TIMES DAILY
Qty: 180 TABLET | Refills: 3 | Status: ON HOLD | OUTPATIENT
Start: 2019-03-01 | End: 2019-03-28

## 2019-03-01 ASSESSMENT — MIFFLIN-ST. JEOR: SCORE: 1089.52

## 2019-03-01 NOTE — PROGRESS NOTES
SUBJECTIVE:   Amira Arreola is a 86 year old female who presents to clinic today for the following health issues:      ED/UC Followup:    Facility:  [facility]  Date of visit: 02/26/19  Reason for visit: laceration  Current Status: would like it checked     The patient presents with her daughter who lives out of town for multiple issues.  She was recently in urgent care for a fall.  She had Dermabond placed on the left hand on the back side.  That is doing quite well and she is up-to-date in terms of her tetanus status.    The patient has multiple myeloma for which she sees oncology and is on chemo.  This is been stable.    The patient has had falls.  None of them I due to dizziness or blackouts.  It sounds like she is a bit unclear why she falls but likely due to decreased strength.  She does not have any focal neurologic symptoms.  She is not having abdominal pain or nausea or vomiting.  It is a bit unclear but it sounds like she does take Benadryl at night.  She also occasionally uses a pain medication at night.  This is for her chronic low back pain.    The patient has chronic edema.  This was evaluated last year with an echo as noted.  She has A. fib and takes Coumadin.  She is not hit her head.  Her blood pressure and pulse were elevated today.    Past Medical History:   Diagnosis Date     Abnormal CXR 2018    then ct done and not significant     Ascending aorta dilatation (H) 04/2016    on echo, mild, fu 7/18 4.0, slightly larger     Cancer, metastatic to bone (H)     due to myeloma     Colonic polyp 2008    adenomatous, fu 2013 tics only     Compression fracture 2016    multiple areas of spine     Dry eyes      Elevated MCV 2015    b12 and folic acid nl     HTN (hypertension) 2000    off meds for years     Lung nodule 08/2018    on ct, 4mm, ct done for fu abnl cxr     Menorrhagia 2002    hysteroscopy and d and c done     MGUS (monoclonal gammopathy of unknown significance) 2015    eval by   Armando     Multiple myeloma (H)     dx  at [place], bone lesions seen on mri      OAB (overactive bladder)     Dr. Grullon     Osteoporosis     fu done  and stable, went off meds then, fu done ; has had gyn fu and added evista 2013 by gyn     Palpitations     nl echo, mildly dilated asc aorta     Paroxysmal atrial fibrillation (H)     had palp and ziopatch showed it, echo nl lv fxn, mild mr and tr, added coum and toprol, toprol dose raised 16     Sciatica of left side     Dr. Helen Ricardo      SVT (supraventricular tachycardia) (H)     on ziopatch     Thrombocytopenia (H)      Past Surgical History:   Procedure Laterality Date     BONE MARROW BIOPSY, BONE SPECIMEN, NEEDLE/TROCAR N/A 2016    Procedure: BIOPSY BONE MARROW;  Surgeon: Bryan Patel MD;  Location:  GI     CATARACT IOL, RT/LT        SECTION  1965, 1966     COLONOSCOPY  2013    Procedure: COLONOSCOPY;  COLONOSCOPY;  Surgeon: Steffany Rockwell MD;  Location:  GI     EXCISE EXOSTOSIS TIBIA / FIBULA  2014    Procedure: EXCISE EXOSTOSIS TIBIA / FIBULA;  Surgeon: Naila Pichardo MD;  Location:  SD     hysteroscopy and d and c      due to bleeding     left anle replacement       right ankle surgery       Social History     Socioeconomic History     Marital status:      Spouse name: Tom     Number of children: 6     Years of education: Not on file     Highest education level: Not on file   Occupational History     Occupation: rn anesthetist     Employer: RETIRED   Social Needs     Financial resource strain: Not on file     Food insecurity:     Worry: Not on file     Inability: Not on file     Transportation needs:     Medical: Not on file     Non-medical: Not on file   Tobacco Use     Smoking status: Never Smoker     Smokeless tobacco: Never Used   Substance and Sexual Activity     Alcohol use: No     Alcohol/week: 0.0 oz      Drug use: No     Sexual activity: No   Lifestyle     Physical activity:     Days per week: Not on file     Minutes per session: Not on file     Stress: Not on file   Relationships     Social connections:     Talks on phone: Not on file     Gets together: Not on file     Attends Jewish service: Not on file     Active member of club or organization: Not on file     Attends meetings of clubs or organizations: Not on file     Relationship status: Not on file     Intimate partner violence:     Fear of current or ex partner: Not on file     Emotionally abused: Not on file     Physically abused: Not on file     Forced sexual activity: Not on file   Other Topics Concern     Parent/sibling w/ CABG, MI or angioplasty before 65F 55M? Not Asked   Social History Narrative     Not on file     Current Outpatient Medications   Medication Sig Dispense Refill     Acetaminophen (TYLENOL PO) Take 500 mg by mouth every 6 hours as needed for mild pain or fever       ACYCLOVIR PO Take 400 mg by mouth 2 times daily       Calcium Citrate-Vitamin D (CALCIUM CITRATE + PO) Take 2,000 mg by mouth daily 2 tabs       carboxymethylcellulose (REFRESH PLUS) 0.5 % SOLN 1 drop 4 times daily       Cholecalciferol (VITAMIN D3 PO) Take 1,000 Units by mouth daily       cycloSPORINE (RESTASIS) 0.05 % ophthalmic emulsion Place 1 drop into both eyes every 12 hours        Desloratadine (CLARINEX PO) Take by mouth daily Taking claritin       dexamethasone (DECADRON) 4 MG tablet Take 20mg (5 tablets) by mouth every week.. 28 tablet 0     dexamethasone (DECADRON) 4 MG tablet Take 20mg (5 tablets) by mouth every week.. 28 tablet 0     dexamethasone (DECADRON) 4 MG tablet Take 20mg (5 tablets) by mouth every week.. 28 tablet 0     DiphenhydrAMINE HCl (BENADRYL PO) Take 25 mg by mouth nightly as needed       latanoprost (XALATAN) 0.005 % ophthalmic solution Place 0.005 drops into the right eye daily  6     lidocaine-prilocaine (EMLA) cream Apply to port site 1  "hour prior to access 30 g 1     LORazepam (ATIVAN) 0.5 MG tablet Take 1 tablet (0.5 mg) by mouth every 8 hours as needed for anxiety 30 tablet 3     metoprolol succinate ER (TOPROL-XL) 100 MG 24 hr tablet Take 1 tablet (100 mg) by mouth 2 times daily 180 tablet 3     Multiple Vitamin (DAILY MULTIVITAMIN PO) Take 1 tablet by mouth daily.       oxyCODONE IR (ROXICODONE) 15 MG tablet Take 1 tablet (15 mg) by mouth every 8 hours as needed for pain maximum 4 tablet(s) per day 90 tablet 0     polyethylene glycol (MIRALAX/GLYCOLAX) powder Take 1 capful by mouth daily as needed        Polyvinyl Alcohol-Povidone (REFRESH OP)        prochlorperazine (COMPAZINE) 10 MG tablet Take 1 tablet (10 mg) by mouth every 6 hours as needed for nausea or vomiting 30 tablet 1     SIMBRINZA 1-0.2 % ophthalmic suspension Place 1 drop into the right eye 2 times daily 1 drop AM and PM  2     triamcinolone (KENALOG) 0.5 % cream Apply sparingly to affected area three times daily. 1-2 weeks , as needed 15 g 1     UNABLE TO FIND MEDICATION NAME: Fresh Coat eye drops       warfarin (COUMADIN) 4 MG tablet TAKE 1-2 TABLETS BY MOUTH EVERY DAY AS DIRECTED BY INR CLINIC 180 tablet 0     Zoledronic Acid (ZOMETA IV) Inject into the vein every 30 days Every 3 month dosing       Allergies   Allergen Reactions     Blood Transfusion Related (Informational Only)      Blood antigens related to receiving Darzelex-AG     Penicillin [Penicillins] Rash     Blotches on chest      FAMILY HISTORY NOTED AND REVIEWED    REVIEW OF SYSTEMS: above    PHYSICAL EXAM    /82   Pulse 100   Temp 97.8  F (36.6  C) (Oral)   Ht 1.651 m (5' 5\")   Wt 64.9 kg (143 lb)   SpO2 96%   BMI 23.80 kg/m      Patient appears non toxic  No supraclavicular, cervical or axillary lymphadenopathy  Lungs clear  cv tachy, irreg, no murmer, rub or gallop, no jvp, 1+-2 lower leg edema bilat  Abdomen normal active bowel sounds, soft, non-tender, no mgr, no hepatosplenomegaly  Left hand " laceration, skin tear, back of hand looks good, derma bond in place, not red    ASSESSMENT:  1. Hand lac, healing  2. Falls, due to weakness but also in part meds and discussed with patient need to discontinue benadryl and limit pain meds as much as possible  3. Myeloma with bone lesions  4. Afib, rate high, if continues to fall discussed with patient and daughter will need to consider discontinue coum    PLAN:  physical therapy, home care  Try to limit pain meds, benadryl  Careful when up  Change toprol dose to 100mg bid    Addy Frias M.D.

## 2019-03-01 NOTE — PATIENT INSTRUCTIONS
Try not to use benadryl and use the lowest dose of pain meds.    Change the toprol to 100mg twice daily    Call if I can help    Addy Frias M.D.

## 2019-03-05 ENCOUNTER — TELEPHONE (OUTPATIENT)
Dept: FAMILY MEDICINE | Facility: CLINIC | Age: 84
End: 2019-03-05

## 2019-03-05 NOTE — TELEPHONE ENCOUNTER
Reason for Call:  Home Health Care    Xin with [facility] called regarding (reason for call): new orders for therapy.     Place order and also callback for verbal auth once placed    Orders are needed for this patient.     PT: Physical therapy, treat and eval    OT:     Skilled Nursing:     Pt Provider: Altagracia    Phone Number Homecare Nurse can be reached at: [phone]    Can we leave a detailed message on this number? YES    Phone number patient can be reached at: Home number on file [phone] (home)    Best Time: any    Call taken on 3/5/2019 at 3:00 PM by Keith Amato

## 2019-03-05 NOTE — TELEPHONE ENCOUNTER
Called Zenia with [facility] to provide verbal orders for PT as requested    Yaakov GOETZ RN

## 2019-03-07 ENCOUNTER — ANTICOAGULATION THERAPY VISIT (OUTPATIENT)
Dept: FAMILY MEDICINE | Facility: CLINIC | Age: 84
End: 2019-03-07
Payer: MEDICARE

## 2019-03-07 ENCOUNTER — TELEPHONE (OUTPATIENT)
Dept: FAMILY MEDICINE | Facility: CLINIC | Age: 84
End: 2019-03-07

## 2019-03-07 LAB — INR POINT OF CARE: 3.5 (ref 0.86–1.14)

## 2019-03-07 PROCEDURE — 99207 ZZC NO CHARGE NURSE ONLY: CPT | Performed by: INTERNAL MEDICINE

## 2019-03-07 PROCEDURE — 85610 PROTHROMBIN TIME: CPT | Mod: QW | Performed by: INTERNAL MEDICINE

## 2019-03-07 PROCEDURE — 36416 COLLJ CAPILLARY BLOOD SPEC: CPT | Performed by: INTERNAL MEDICINE

## 2019-03-07 NOTE — PROGRESS NOTES
ANTICOAGULATION FOLLOW-UP CLINIC VISIT    Patient Name:  Amira Arreola  Date:  3/7/2019  Contact Type:  Telephone/ with Zhane muhammad    SUBJECTIVE:     Patient Findings     Positives:   Unexplained INR or factor level change           OBJECTIVE    INR Protime   Date Value Ref Range Status   03/07/2019 3.5 (A) 0.86 - 1.14 Final       ASSESSMENT / PLAN  INR assessment SUPRA    Recheck INR In: 1 WEEK    INR Location Clinic      Anticoagulation Summary  As of 3/7/2019    INR goal:   2.0-3.0   TTR:   67.2 % (2.7 y)   INR used for dosing:   3.5! (3/7/2019)   Warfarin maintenance plan:   4 mg (4 mg x 1) every Sun, Tue, Thu; 2 mg (4 mg x 0.5) all other days   Full warfarin instructions:   3/7: 2 mg; Otherwise 4 mg every Sun, Tue, Thu; 2 mg all other days   Weekly warfarin total:   20 mg   Plan last modified:   Tigist Corral RN (2/19/2019)   Next INR check:   3/13/2019   Target end date:   Indefinite    Indications    Long-term (current) use of anticoagulants [Z79.01] [Z79.01]  Atrial fibrillation (H) [I48.91] (Resolved) [I48.91]             Anticoagulation Episode Summary     INR check location:       Preferred lab:       Send INR reminders to:   CS ANTICOAGULATION    Comments:    INR to be drawn at infusion visit OR home care nurse. Watch result notes. Patient can be reached at home phone [phone]. Do not leave dosing with spouse.   Pt advised to call for triage if not hearing back from us for dosing.      Anticoagulation Care Providers     Provider Role Specialty Phone number    Addy Frias MD [facility] Internal Medicine [phone]            See the Encounter Report to view Anticoagulation Flowsheet and Dosing Calendar (Go to Encounters tab in chart review, and find the Anticoagulation Therapy Visit)    Dosage adjustment made based on physician directed care plan.    Pt aware if signs of clotting (pain, tenderness, swelling, color change in leg or arm, SOB) and bleeding occur (blood in stool, urine,  large bruising, bleeding gums, nosebleeds) to have INR check sooner. If sx severe report to ER or concerned for stroke call 911. If general questions or concerns arise, call clinic.         Jessica Moody RN

## 2019-03-07 NOTE — TELEPHONE ENCOUNTER
See INR encounter for dosing.  Called results to Zhane. Noted on infusion appt to have inr next Wed.

## 2019-03-07 NOTE — TELEPHONE ENCOUNTER
Reason for Call:  Other INR results    Detailed comments: Senia called from  in home health care with INR results:  3.5    Please give Senia call back asap      Phone Number Patient can be reached at: Other phone number: [phone]    Best Time: any    Can we leave a detailed message on this number? YES    Call taken on 3/7/2019 at 10:39 AM by Arleth Pollock

## 2019-03-13 ENCOUNTER — ONCOLOGY VISIT (OUTPATIENT)
Dept: ONCOLOGY | Facility: CLINIC | Age: 84
End: 2019-03-13
Attending: INTERNAL MEDICINE
Payer: MEDICARE

## 2019-03-13 ENCOUNTER — HOSPITAL ENCOUNTER (OUTPATIENT)
Facility: CLINIC | Age: 84
Setting detail: SPECIMEN
Discharge: HOME | End: 2019-03-13
Attending: INTERNAL MEDICINE | Admitting: INTERNAL MEDICINE
Payer: MEDICARE

## 2019-03-13 ENCOUNTER — INFUSION THERAPY VISIT (OUTPATIENT)
Dept: INFUSION THERAPY | Facility: CLINIC | Age: 84
End: 2019-03-13
Attending: INTERNAL MEDICINE
Payer: MEDICARE

## 2019-03-13 ENCOUNTER — ANTICOAGULATION THERAPY VISIT (OUTPATIENT)
Dept: FAMILY MEDICINE | Facility: CLINIC | Age: 84
End: 2019-03-13
Payer: MEDICARE

## 2019-03-13 VITALS
WEIGHT: 153.2 LBS | HEIGHT: 65 IN | RESPIRATION RATE: 16 BRPM | OXYGEN SATURATION: 99 % | SYSTOLIC BLOOD PRESSURE: 135 MMHG | BODY MASS INDEX: 25.52 KG/M2 | TEMPERATURE: 98.2 F | DIASTOLIC BLOOD PRESSURE: 84 MMHG | HEART RATE: 122 BPM

## 2019-03-13 VITALS
BODY MASS INDEX: 23.8 KG/M2 | HEART RATE: 132 BPM | OXYGEN SATURATION: 100 % | RESPIRATION RATE: 18 BRPM | HEIGHT: 65 IN | SYSTOLIC BLOOD PRESSURE: 127 MMHG | TEMPERATURE: 98.2 F | DIASTOLIC BLOOD PRESSURE: 86 MMHG

## 2019-03-13 DIAGNOSIS — C90.00 MULTIPLE MYELOMA NOT HAVING ACHIEVED REMISSION (H): Primary | ICD-10-CM

## 2019-03-13 DIAGNOSIS — I48.0 PAROXYSMAL ATRIAL FIBRILLATION (H): ICD-10-CM

## 2019-03-13 LAB
ALBUMIN SERPL-MCNC: 3 G/DL (ref 3.4–5)
ALP SERPL-CCNC: 64 U/L (ref 40–150)
ALT SERPL W P-5'-P-CCNC: 68 U/L (ref 0–50)
ANION GAP SERPL CALCULATED.3IONS-SCNC: 8 MMOL/L (ref 3–14)
AST SERPL W P-5'-P-CCNC: 34 U/L (ref 0–45)
BASOPHILS # BLD AUTO: 0 10E9/L (ref 0–0.2)
BASOPHILS NFR BLD AUTO: 0.1 %
BILIRUB DIRECT SERPL-MCNC: 0.1 MG/DL (ref 0–0.2)
BILIRUB SERPL-MCNC: 0.6 MG/DL (ref 0.2–1.3)
BUN SERPL-MCNC: 22 MG/DL (ref 7–30)
CALCIUM SERPL-MCNC: 8.7 MG/DL (ref 8.5–10.1)
CHLORIDE SERPL-SCNC: 111 MMOL/L (ref 94–109)
CO2 SERPL-SCNC: 23 MMOL/L (ref 20–32)
CREAT SERPL-MCNC: 0.73 MG/DL (ref 0.52–1.04)
DIFFERENTIAL METHOD BLD: ABNORMAL
EOSINOPHIL # BLD AUTO: 0 10E9/L (ref 0–0.7)
EOSINOPHIL NFR BLD AUTO: 0.1 %
ERYTHROCYTE [DISTWIDTH] IN BLOOD BY AUTOMATED COUNT: 15.1 % (ref 10–15)
GFR SERPL CREATININE-BSD FRML MDRD: 74 ML/MIN/{1.73_M2}
GLUCOSE SERPL-MCNC: 131 MG/DL (ref 70–99)
HCT VFR BLD AUTO: 33.3 % (ref 35–47)
HGB BLD-MCNC: 10.7 G/DL (ref 11.7–15.7)
IMM GRANULOCYTES # BLD: 0 10E9/L (ref 0–0.4)
IMM GRANULOCYTES NFR BLD: 0.3 %
INR PPP: 2.6 (ref 0.86–1.14)
LYMPHOCYTES # BLD AUTO: 1 10E9/L (ref 0.8–5.3)
LYMPHOCYTES NFR BLD AUTO: 9.3 %
MCH RBC QN AUTO: 31.3 PG (ref 26.5–33)
MCHC RBC AUTO-ENTMCNC: 32.1 G/DL (ref 31.5–36.5)
MCV RBC AUTO: 97 FL (ref 78–100)
MONOCYTES # BLD AUTO: 1.3 10E9/L (ref 0–1.3)
MONOCYTES NFR BLD AUTO: 12.2 %
NEUTROPHILS # BLD AUTO: 8.3 10E9/L (ref 1.6–8.3)
NEUTROPHILS NFR BLD AUTO: 78 %
NRBC # BLD AUTO: 0 10*3/UL
NRBC BLD AUTO-RTO: 0 /100
PLATELET # BLD AUTO: 207 10E9/L (ref 150–450)
POTASSIUM SERPL-SCNC: 3.7 MMOL/L (ref 3.4–5.3)
PROT SERPL-MCNC: 5.4 G/DL (ref 6.8–8.8)
RBC # BLD AUTO: 3.42 10E12/L (ref 3.8–5.2)
SODIUM SERPL-SCNC: 142 MMOL/L (ref 133–144)
WBC # BLD AUTO: 10.6 10E9/L (ref 4–11)

## 2019-03-13 PROCEDURE — 85025 COMPLETE CBC W/AUTO DIFF WBC: CPT | Performed by: INTERNAL MEDICINE

## 2019-03-13 PROCEDURE — 00000402 ZZHCL STATISTIC TOTAL PROTEIN: Performed by: INTERNAL MEDICINE

## 2019-03-13 PROCEDURE — 83883 ASSAY NEPHELOMETRY NOT SPEC: CPT | Performed by: INTERNAL MEDICINE

## 2019-03-13 PROCEDURE — 80076 HEPATIC FUNCTION PANEL: CPT | Performed by: INTERNAL MEDICINE

## 2019-03-13 PROCEDURE — 96415 CHEMO IV INFUSION ADDL HR: CPT

## 2019-03-13 PROCEDURE — 25000128 H RX IP 250 OP 636: Performed by: INTERNAL MEDICINE

## 2019-03-13 PROCEDURE — 84165 PROTEIN E-PHORESIS SERUM: CPT | Performed by: INTERNAL MEDICINE

## 2019-03-13 PROCEDURE — 85610 PROTHROMBIN TIME: CPT | Performed by: INTERNAL MEDICINE

## 2019-03-13 PROCEDURE — 99207 ZZC NO CHARGE NURSE ONLY: CPT | Performed by: INTERNAL MEDICINE

## 2019-03-13 PROCEDURE — 80048 BASIC METABOLIC PNL TOTAL CA: CPT | Performed by: INTERNAL MEDICINE

## 2019-03-13 PROCEDURE — 96401 CHEMO ANTI-NEOPL SQ/IM: CPT

## 2019-03-13 PROCEDURE — A9270 NON-COVERED ITEM OR SERVICE: HCPCS | Mod: GY | Performed by: INTERNAL MEDICINE

## 2019-03-13 PROCEDURE — 96413 CHEMO IV INFUSION 1 HR: CPT

## 2019-03-13 PROCEDURE — 99214 OFFICE O/P EST MOD 30 MIN: CPT | Performed by: INTERNAL MEDICINE

## 2019-03-13 PROCEDURE — 25800030 ZZH RX IP 258 OP 636: Performed by: INTERNAL MEDICINE

## 2019-03-13 PROCEDURE — 96367 TX/PROPH/DG ADDL SEQ IV INF: CPT

## 2019-03-13 PROCEDURE — 25000132 ZZH RX MED GY IP 250 OP 250 PS 637: Mod: GY | Performed by: INTERNAL MEDICINE

## 2019-03-13 RX ORDER — HEPARIN SODIUM (PORCINE) LOCK FLUSH IV SOLN 100 UNIT/ML 100 UNIT/ML
5 SOLUTION INTRAVENOUS EVERY 8 HOURS
Status: CANCELLED
Start: 2019-03-13

## 2019-03-13 RX ORDER — SODIUM CHLORIDE 9 MG/ML
1000 INJECTION, SOLUTION INTRAVENOUS CONTINUOUS PRN
Status: CANCELLED
Start: 2019-03-27

## 2019-03-13 RX ORDER — MEPERIDINE HYDROCHLORIDE 25 MG/ML
25 INJECTION INTRAMUSCULAR; INTRAVENOUS; SUBCUTANEOUS EVERY 30 MIN PRN
Status: CANCELLED | OUTPATIENT
Start: 2019-03-27

## 2019-03-13 RX ORDER — LORAZEPAM 2 MG/ML
0.5 INJECTION INTRAMUSCULAR EVERY 4 HOURS PRN
Status: CANCELLED
Start: 2019-03-27

## 2019-03-13 RX ORDER — ALBUTEROL SULFATE 90 UG/1
1-2 AEROSOL, METERED RESPIRATORY (INHALATION)
Status: CANCELLED
Start: 2019-03-13

## 2019-03-13 RX ORDER — ALBUTEROL SULFATE 0.83 MG/ML
2.5 SOLUTION RESPIRATORY (INHALATION)
Status: CANCELLED | OUTPATIENT
Start: 2019-03-13

## 2019-03-13 RX ORDER — SODIUM CHLORIDE 9 MG/ML
1000 INJECTION, SOLUTION INTRAVENOUS CONTINUOUS PRN
Status: CANCELLED
Start: 2019-03-13

## 2019-03-13 RX ORDER — DIPHENHYDRAMINE HCL 25 MG
50 CAPSULE ORAL ONCE
Status: COMPLETED | OUTPATIENT
Start: 2019-03-13 | End: 2019-03-13

## 2019-03-13 RX ORDER — EPINEPHRINE 0.3 MG/.3ML
0.3 INJECTION SUBCUTANEOUS EVERY 5 MIN PRN
Status: CANCELLED | OUTPATIENT
Start: 2019-03-27

## 2019-03-13 RX ORDER — METHYLPREDNISOLONE SODIUM SUCCINATE 125 MG/2ML
125 INJECTION, POWDER, LYOPHILIZED, FOR SOLUTION INTRAMUSCULAR; INTRAVENOUS
Status: CANCELLED
Start: 2019-03-27

## 2019-03-13 RX ORDER — DIPHENHYDRAMINE HCL 25 MG
50 CAPSULE ORAL ONCE
Status: CANCELLED | OUTPATIENT
Start: 2019-03-13

## 2019-03-13 RX ORDER — HEPARIN SODIUM (PORCINE) LOCK FLUSH IV SOLN 100 UNIT/ML 100 UNIT/ML
5 SOLUTION INTRAVENOUS EVERY 8 HOURS
Status: CANCELLED
Start: 2019-03-27

## 2019-03-13 RX ORDER — ALBUTEROL SULFATE 0.83 MG/ML
2.5 SOLUTION RESPIRATORY (INHALATION)
Status: CANCELLED | OUTPATIENT
Start: 2019-03-27

## 2019-03-13 RX ORDER — MEPERIDINE HYDROCHLORIDE 25 MG/ML
25 INJECTION INTRAMUSCULAR; INTRAVENOUS; SUBCUTANEOUS EVERY 30 MIN PRN
Status: CANCELLED | OUTPATIENT
Start: 2019-03-13

## 2019-03-13 RX ORDER — ACETAMINOPHEN 325 MG/1
650 TABLET ORAL ONCE
Status: COMPLETED | OUTPATIENT
Start: 2019-03-13 | End: 2019-03-13

## 2019-03-13 RX ORDER — ACETAMINOPHEN 325 MG/1
650 TABLET ORAL ONCE
Status: CANCELLED | OUTPATIENT
Start: 2019-03-13

## 2019-03-13 RX ORDER — EPINEPHRINE 1 MG/ML
0.3 INJECTION, SOLUTION INTRAMUSCULAR; SUBCUTANEOUS EVERY 5 MIN PRN
Status: CANCELLED | OUTPATIENT
Start: 2019-03-27

## 2019-03-13 RX ORDER — METHYLPREDNISOLONE SODIUM SUCCINATE 125 MG/2ML
125 INJECTION, POWDER, LYOPHILIZED, FOR SOLUTION INTRAMUSCULAR; INTRAVENOUS
Status: CANCELLED
Start: 2019-03-13

## 2019-03-13 RX ORDER — DEXAMETHASONE 4 MG/1
TABLET ORAL
Qty: 28 TABLET | Refills: 0 | Status: SHIPPED | OUTPATIENT
Start: 2019-03-13 | End: 2019-03-22

## 2019-03-13 RX ORDER — DIPHENHYDRAMINE HYDROCHLORIDE 50 MG/ML
50 INJECTION INTRAMUSCULAR; INTRAVENOUS
Status: CANCELLED
Start: 2019-03-27

## 2019-03-13 RX ORDER — ALBUTEROL SULFATE 90 UG/1
1-2 AEROSOL, METERED RESPIRATORY (INHALATION)
Status: CANCELLED
Start: 2019-03-27

## 2019-03-13 RX ORDER — EPINEPHRINE 1 MG/ML
0.3 INJECTION, SOLUTION INTRAMUSCULAR; SUBCUTANEOUS EVERY 5 MIN PRN
Status: CANCELLED | OUTPATIENT
Start: 2019-03-13

## 2019-03-13 RX ORDER — DIPHENHYDRAMINE HYDROCHLORIDE 50 MG/ML
50 INJECTION INTRAMUSCULAR; INTRAVENOUS
Status: CANCELLED
Start: 2019-03-13

## 2019-03-13 RX ORDER — EPINEPHRINE 0.3 MG/.3ML
0.3 INJECTION SUBCUTANEOUS EVERY 5 MIN PRN
Status: CANCELLED | OUTPATIENT
Start: 2019-03-13

## 2019-03-13 RX ORDER — HEPARIN SODIUM (PORCINE) LOCK FLUSH IV SOLN 100 UNIT/ML 100 UNIT/ML
5 SOLUTION INTRAVENOUS EVERY 8 HOURS
Status: DISCONTINUED | OUTPATIENT
Start: 2019-03-13 | End: 2019-03-13 | Stop reason: HOSPADM

## 2019-03-13 RX ORDER — LORAZEPAM 2 MG/ML
0.5 INJECTION INTRAMUSCULAR EVERY 4 HOURS PRN
Status: CANCELLED
Start: 2019-03-13

## 2019-03-13 RX ADMIN — ACETAMINOPHEN 650 MG: 325 TABLET ORAL at 10:08

## 2019-03-13 RX ADMIN — DARATUMUMAB 1000 MG: 100 INJECTION, SOLUTION, CONCENTRATE INTRAVENOUS at 10:44

## 2019-03-13 RX ADMIN — BORTEZOMIB 2.1 MG: 3.5 INJECTION, POWDER, LYOPHILIZED, FOR SOLUTION INTRAVENOUS; SUBCUTANEOUS at 11:49

## 2019-03-13 RX ADMIN — SODIUM CHLORIDE 250 ML: 9 INJECTION, SOLUTION INTRAVENOUS at 10:22

## 2019-03-13 RX ADMIN — HEPARIN SODIUM (PORCINE) LOCK FLUSH IV SOLN 100 UNIT/ML 5 ML: 100 SOLUTION at 12:20

## 2019-03-13 RX ADMIN — DIPHENHYDRAMINE HYDROCHLORIDE 50 MG: 25 CAPSULE ORAL at 10:08

## 2019-03-13 RX ADMIN — DEXAMETHASONE SODIUM PHOSPHATE 12 MG: 10 INJECTION, SOLUTION INTRAMUSCULAR; INTRAVENOUS at 10:22

## 2019-03-13 ASSESSMENT — MIFFLIN-ST. JEOR: SCORE: 1135.79

## 2019-03-13 NOTE — PATIENT INSTRUCTIONS
Continue chemotherapy.   Zometa next month.  Cardiology appointment soon for weight gain and shortness of breath. She has seen them before. Cardiology clinic contacted, first avail appt on 3/26/19/Dorita   Follow up in 1 month.      Back to [facility] with Salazar

## 2019-03-13 NOTE — PROGRESS NOTES
Infusion Nursing Note:  Amira Arreola presents today for Darzalex/Velcade.    Patient seen by provider today: Yes: Dr. Roberts   present during visit today: Not Applicable.    Note: Patient took her decadron yesterday, thinking her appt was yesterday.  Ok per pharmacy.      weight is up about 5kg from last visit, pt edematous, confirmed with Dr. Roberts and Steffany pharmacist that there will be no dose adjustment. OK to give chemo per Dr. Roberts    Intravenous Access:  Implanted Port.    Treatment Conditions:  Lab Results   Component Value Date    HGB 10.7 03/13/2019     Lab Results   Component Value Date    WBC 10.6 03/13/2019      Lab Results   Component Value Date    ANEU 8.3 03/13/2019     Lab Results   Component Value Date     03/13/2019      Lab Results   Component Value Date     03/13/2019                   Lab Results   Component Value Date    POTASSIUM 3.7 03/13/2019           No results found for: MAG         Lab Results   Component Value Date    CR 0.73 03/13/2019                   Lab Results   Component Value Date    BRADLEY 8.7 03/13/2019                Lab Results   Component Value Date    BILITOTAL 0.6 03/13/2019           Lab Results   Component Value Date    ALBUMIN 3.0 03/13/2019                    Lab Results   Component Value Date    ALT 68 03/13/2019           Lab Results   Component Value Date    AST 34 03/13/2019       Results reviewed, labs MET treatment parameters, ok to proceed with treatment.      Post Infusion Assessment:  Patient tolerated infusion without incident.  Patient tolerated injection without incident.  Blood return noted pre and post infusion.  Site patent and intact, free from redness, edema or discomfort.  No evidence of extravasations.  Access discontinued per protocol.    Discharge Plan:   Discharge instructions reviewed with: Patient.  Patient and/or family verbalized understanding of discharge instructions and all questions answered.  Copy of AVS reviewed  with patient and/or family.  Patient will return 3/27/19 for next appointment.  Patient discharged in stable condition accompanied by: self.  Departure Mode: Ambulatory with walker.    Andie Rosas RN

## 2019-03-13 NOTE — PROGRESS NOTES
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     1. On 09/21/2015, WBC of 4.2, hemoglobin of 13.2 and platelets of 138.    -On 09/29/2015, SPEP does not reveal any M-spike.   -On 10/02/2015, JANET does not reveal any monoclonal protein.     -On 10/22/2015, urine immunofixation reveals monoclonal free kappa light chain.    2. On 05/11/2016, kappa light chain of 50, lambda light chain of 0.32 and ratio of kappa to lambda of 156.2.  3. Bone marrow biopsy on 05/25/2016 reveals 40-50% kappa light chain restricted plasma cells.  Cytogenetics is normal. FISH panel reveals translocation 11;14.    4. MRI of bones on 06/21/2016 and 06/22/2016 reveals myeloma lesions.  5. On 08/24/2016, she was started on revlimid with dexamethasone 20 mg weekly. She did not have any significant response to treatment.   6. Velcade and dexamethasone started on 03/21/2017.    7. Daratumumab added to velcade and dexamethasone on 05/31/2017.   -Velcade given every 14 days starting 08/01/2018.     SUBJECTIVE:  Mrs. Arreola is an 86-year-old female with kappa free light chain multiple myeloma.  She is on daratumumab, Velcade and dexamethasone.  For her convenience, we are giving her Velcade every other week.      Overall, her condition is stable.  In last few weeks, she has been gaining some weight.  She has noted that she gets short of breath on exertion.  She does have atrial fibrillation and previously has been seen by cardiologist.  She has not seen them recently.      No headache.  There is some dizziness.  No chest pain.  No abdominal pain, nausea, or vomiting.  Appetite is fairly good.  No urinary or bowel complaints.  No bleeding.      Patient has chronic back pain.  She takes oxycodone which helps.      A few days ago, she was in a grocery store.  She bumped her arm on an aisle and had a skin tear.  She was seen in the urgent care.       PHYSICAL  EXAMINATION:   Alert and oriented x 3. ECOG PS of 2.  EYES:  No icterus.   THROAT:  No ulcer or thrush.   NECK:  Supple. No lymphadenopathy.   AXILLAE:  No lymphadenopathy.   LUNGS:  Good air entry bilaterally.  No crackles or wheezing.   HEART:  Regular.  No murmur.   ABDOMEN:  Soft and nontender.  No mass.   EXTREMITIES: Bilateral pedal edema. No calf swelling or tenderness.   SKIN: No petechia.     LABORATORY DATA:  Reviewed.      ASSESSMENT:   1.  An 86-year-old female with kappa free light chain multiple myeloma.   2.  Chronic back pain.   3.  Fatigue.   4.  Shortness of breath on exertion and weight gain.       PLAN:   1.  Discussed regarding myeloma. Patient is stable from myeloma.  She will continue on Velcade, daratumumab, and dexamethasone.  Overall, she has been tolerating it well.     2.  Patient has weight gain and shortness of breath.  I advised her to see her cardiologist.     3.  For pain, she will continue on oxycodone.   4.  She had a few questions, which were all answered.  I will see her in a month for followup.  Advised her to call us with any problem in between.     ADDENDUM: Labs reveal kappa free light chain better at 1.60 and M-spike stable at 0.1. Myeloma is stable.        ITZ LOPEZ MD             D: 2019   T: 2019   MT: GASPER      Name:     ALBERTO PARSON   MRN:      [id number]        Account:      [id number]   :      1932           Visit Date:   2019      Document: [id number]

## 2019-03-13 NOTE — Clinical Note
3/13/2019         RE: Amira Arreola  [address]  [facility] [address]        Dear Colleague,    Thank you for referring your patient, Amria Arreola, to the [facility]. Please see a copy of my visit note below.    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     1. On 09/21/2015, WBC of 4.2, hemoglobin of 13.2 and platelets of 138.    -On 09/29/2015, SPEP does not reveal any M-spike.   -On 10/02/2015, JANET does not reveal any monoclonal protein.     -On 10/22/2015, urine immunofixation reveals monoclonal free kappa light chain.    2. On 05/11/2016, kappa light chain of 50, lambda light chain of 0.32 and ratio of kappa to lambda of 156.2.  3. Bone marrow biopsy on 05/25/2016 reveals 40-50% kappa light chain restricted plasma cells.  Cytogenetics is normal. FISH panel reveals translocation 11;14.    4. MRI of bones on 06/21/2016 and 06/22/2016 reveals myeloma lesions.  5. On 08/24/2016, she was started on revlimid with dexamethasone 20 mg weekly. She did not have any significant response to treatment.   6. Velcade and dexamethasone started on 03/21/2017.    7. Daratumumab added to velcade and dexamethasone on 05/31/2017.   -Velcade given every 14 days starting 08/01/2018.     SUBJECTIVE:  Mrs. Arreola is an 86-year-old female with kappa free light chain multiple myeloma.  She is on daratumumab, Velcade and dexamethasone.  For her convenience, we are giving her Velcade every other week.      Overall, her condition is stable.  In last few weeks, she has been gaining some weight.  She has noted that she gets short of breath on exertion.  She does have atrial fibrillation and previously has been seen by cardiologist.  She has not seen them recently.      No headache.  There is some dizziness.  No chest pain.  No abdominal pain, nausea, or vomiting.  Appetite is fairly good.  No urinary or bowel  complaints.  No bleeding.      Patient has chronic back pain.  She takes oxycodone which helps.      A few days ago, she was in a grocery store.  She bumped her arm on an aisle and had a skin tear.  She was seen in the urgent care.       PHYSICAL EXAMINATION:   Alert and oriented x 3. ECOG PS of 2.  EYES:  No icterus.   THROAT:  No ulcer or thrush.   NECK:  Supple. No lymphadenopathy.   AXILLAE:  No lymphadenopathy.   LUNGS:  Good air entry bilaterally.  No crackles or wheezing.   HEART:  Regular.  No murmur.   ABDOMEN:  Soft and nontender.  No mass.   EXTREMITIES: Bilateral pedal edema. No calf swelling or tenderness.   SKIN: No petechia.     LABORATORY DATA:  Reviewed.      ASSESSMENT:   1.  An 86-year-old female with kappa free light chain multiple myeloma.   2.  Chronic back pain.   3.  Fatigue.   4.  Shortness of breath on exertion and weight gain.       PLAN:   1.  Discussed regarding myeloma. Patient is stable from myeloma.  She will continue on Velcade, daratumumab, and dexamethasone.  Overall, she has been tolerating it well.     2.  Patient has weight gain and shortness of breath.  I advised her to see her cardiologist.     3.  For pain, she will continue on oxycodone.   4.  She had a few questions, which were all answered.  I will see her in a month for followup.  Advised her to call us with any problem in between.     ADDENDUM: Labs reveal kappa free light chain better at 1.60 and M-spike stable at 0.1. Myeloma is stable.        ITZ LOPEZ MD             D: 2019   T: 2019   MT: GASPER      Name:     ALBERTO PARSON   MRN:      [id number]        Account:      [id number]   :      1932           Visit Date:   2019      Document: [id number]        Again, thank you for allowing me to participate in the care of your patient.        Sincerely,        Itz Lopez MD

## 2019-03-13 NOTE — PROGRESS NOTES
ANTICOAGULATION FOLLOW-UP CLINIC VISIT    Patient Name:  Amira Arreola  Date:  3/13/2019  Contact Type: telephone with pt    SUBJECTIVE:     Patient Findings            OBJECTIVE    INR   Date Value Ref Range Status   2019 2.60 (H) 0.86 - 1.14 Final       ASSESSMENT / PLAN  INR assessment THER    Recheck INR In: 2 WEEKS    INR Location Outside lab      Anticoagulation Summary  As of 3/13/2019    INR goal:   2.0-3.0   TTR:   67.0 % (2.7 y)   INR used for dosin.60 (3/13/2019)   Warfarin maintenance plan:   4 mg (4 mg x 1) every Tue, Sat; 2 mg (4 mg x 0.5) all other days   Full warfarin instructions:   4 mg every Tue, Sat; 2 mg all other days   Weekly warfarin total:   18 mg   Plan last modified:   Jessica Moody RN (3/13/2019)   Next INR check:   3/27/2019   Target end date:   Indefinite    Indications    Long-term (current) use of anticoagulants [Z79.01] [Z79.01]  Atrial fibrillation (H) [I48.91] (Resolved) [I48.91]             Anticoagulation Episode Summary     INR check location:       Preferred lab:       Send INR reminders to:    ANTICOAGULATION    Comments:    INR to be drawn at infusion visit OR home care nurse. If scheduled for Homecare, Please notify  Zhane [phone] Patient can be reached at home phone [phone]. Do not leave dosing with spouse      Anticoagulation Care Providers     Provider Role Specialty Phone number    Addy Frias MD [facility] Internal Medicine [phone]            See the Encounter Report to view Anticoagulation Flowsheet and Dosing Calendar (Go to Encounters tab in chart review, and find the Anticoagulation Therapy Visit)    Dosage adjustment made based on physician directed care plan.    Spoke with pt for dosing and also relayed to KARLIE Howell, SHELLIE

## 2019-03-14 LAB
ALBUMIN SERPL ELPH-MCNC: 3.4 G/DL (ref 3.7–5.1)
ALPHA1 GLOB SERPL ELPH-MCNC: 0.3 G/DL (ref 0.2–0.4)
ALPHA2 GLOB SERPL ELPH-MCNC: 0.7 G/DL (ref 0.5–0.9)
B-GLOBULIN SERPL ELPH-MCNC: 0.6 G/DL (ref 0.6–1)
GAMMA GLOB SERPL ELPH-MCNC: 0.2 G/DL (ref 0.7–1.6)
M PROTEIN SERPL ELPH-MCNC: 0.1 G/DL
PROT PATTERN SERPL ELPH-IMP: ABNORMAL

## 2019-03-15 ENCOUNTER — TELEPHONE (OUTPATIENT)
Dept: FAMILY MEDICINE | Facility: CLINIC | Age: 84
End: 2019-03-15

## 2019-03-15 LAB
KAPPA LC UR-MCNC: 1.6 MG/DL (ref 0.33–1.94)
KAPPA LC/LAMBDA SER: 3.14 {RATIO} (ref 0.26–1.65)
LAMBDA LC SERPL-MCNC: 0.51 MG/DL (ref 0.57–2.63)

## 2019-03-15 NOTE — TELEPHONE ENCOUNTER
Reason for Call: Request for an order or referral:    Order or referral being requested:   OT  3 visits for energy conservation and ADL       Date needed: as soon as possible    Additional comments:    Phone number Patient can be reached at:  Cell number on file:    Telephone Information:   Mobile [phone]       Best Time:  any    Can we leave a detailed message on this number?  YES    Call taken on 3/15/2019 at 2:50 PM by Arleth Pollock

## 2019-03-15 NOTE — TELEPHONE ENCOUNTER
Detailed message left giving verbal on below requested home care orders    Aruna ROBERSON RN

## 2019-03-22 ENCOUNTER — HOSPITAL ENCOUNTER (INPATIENT)
Facility: CLINIC | Age: 84
LOS: 6 days | Discharge: NURSING, REHAB OR LONG-TERM CARE | DRG: 308 | End: 2019-03-28
Attending: EMERGENCY MEDICINE | Admitting: HOSPITALIST
Payer: MEDICARE

## 2019-03-22 ENCOUNTER — APPOINTMENT (OUTPATIENT)
Dept: GENERAL RADIOLOGY | Facility: CLINIC | Age: 84
DRG: 308 | End: 2019-03-22
Attending: EMERGENCY MEDICINE
Payer: MEDICARE

## 2019-03-22 DIAGNOSIS — I48.91 ATRIAL FIBRILLATION WITH RVR (H): ICD-10-CM

## 2019-03-22 DIAGNOSIS — J90 PLEURAL EFFUSION: ICD-10-CM

## 2019-03-22 DIAGNOSIS — C90.00 MULTIPLE MYELOMA NOT HAVING ACHIEVED REMISSION (H): ICD-10-CM

## 2019-03-22 DIAGNOSIS — I48.0 PAROXYSMAL ATRIAL FIBRILLATION (H): ICD-10-CM

## 2019-03-22 DIAGNOSIS — I50.9 ACUTE ON CHRONIC CONGESTIVE HEART FAILURE, UNSPECIFIED HEART FAILURE TYPE (H): Primary | ICD-10-CM

## 2019-03-22 LAB
ANION GAP SERPL CALCULATED.3IONS-SCNC: 6 MMOL/L (ref 3–14)
BASOPHILS # BLD AUTO: 0 10E9/L (ref 0–0.2)
BASOPHILS NFR BLD AUTO: 0 %
BUN SERPL-MCNC: 18 MG/DL (ref 7–30)
CALCIUM SERPL-MCNC: 9.1 MG/DL (ref 8.5–10.1)
CHLORIDE SERPL-SCNC: 110 MMOL/L (ref 94–109)
CO2 SERPL-SCNC: 28 MMOL/L (ref 20–32)
CREAT SERPL-MCNC: 0.76 MG/DL (ref 0.52–1.04)
DIFFERENTIAL METHOD BLD: ABNORMAL
ELLIPTOCYTES BLD QL SMEAR: SLIGHT
EOSINOPHIL # BLD AUTO: 0.1 10E9/L (ref 0–0.7)
EOSINOPHIL NFR BLD AUTO: 1 %
ERYTHROCYTE [DISTWIDTH] IN BLOOD BY AUTOMATED COUNT: 15 % (ref 10–15)
GFR SERPL CREATININE-BSD FRML MDRD: 71 ML/MIN/{1.73_M2}
GLUCOSE SERPL-MCNC: 108 MG/DL (ref 70–99)
HCT VFR BLD AUTO: 36.1 % (ref 35–47)
HGB BLD-MCNC: 11.4 G/DL (ref 11.7–15.7)
INR PPP: 2.16 (ref 0.86–1.14)
INTERPRETATION ECG - MUSE: NORMAL
LYMPHOCYTES # BLD AUTO: 1.9 10E9/L (ref 0.8–5.3)
LYMPHOCYTES NFR BLD AUTO: 23 %
MCH RBC QN AUTO: 31 PG (ref 26.5–33)
MCHC RBC AUTO-ENTMCNC: 31.6 G/DL (ref 31.5–36.5)
MCV RBC AUTO: 98 FL (ref 78–100)
MONOCYTES # BLD AUTO: 0.8 10E9/L (ref 0–1.3)
MONOCYTES NFR BLD AUTO: 10 %
NEUTROPHILS # BLD AUTO: 5.3 10E9/L (ref 1.6–8.3)
NEUTROPHILS NFR BLD AUTO: 66 %
NT-PROBNP SERPL-MCNC: 4828 PG/ML (ref 0–1800)
PLATELET # BLD AUTO: 175 10E9/L (ref 150–450)
PLATELET # BLD EST: ABNORMAL 10*3/UL
POTASSIUM SERPL-SCNC: 3.9 MMOL/L (ref 3.4–5.3)
RBC # BLD AUTO: 3.68 10E12/L (ref 3.8–5.2)
SODIUM SERPL-SCNC: 144 MMOL/L (ref 133–144)
TSH SERPL DL<=0.005 MIU/L-ACNC: 2.31 MU/L (ref 0.4–4)
WBC # BLD AUTO: 8.1 10E9/L (ref 4–11)

## 2019-03-22 PROCEDURE — 25000128 H RX IP 250 OP 636: Performed by: EMERGENCY MEDICINE

## 2019-03-22 PROCEDURE — 84484 ASSAY OF TROPONIN QUANT: CPT | Performed by: HOSPITALIST

## 2019-03-22 PROCEDURE — 93005 ELECTROCARDIOGRAM TRACING: CPT | Mod: 76

## 2019-03-22 PROCEDURE — 99223 1ST HOSP IP/OBS HIGH 75: CPT | Mod: AI | Performed by: HOSPITALIST

## 2019-03-22 PROCEDURE — 99285 EMERGENCY DEPT VISIT HI MDM: CPT | Mod: 25

## 2019-03-22 PROCEDURE — 36415 COLL VENOUS BLD VENIPUNCTURE: CPT | Performed by: HOSPITALIST

## 2019-03-22 PROCEDURE — 83880 ASSAY OF NATRIURETIC PEPTIDE: CPT | Performed by: EMERGENCY MEDICINE

## 2019-03-22 PROCEDURE — 85610 PROTHROMBIN TIME: CPT | Performed by: EMERGENCY MEDICINE

## 2019-03-22 PROCEDURE — 85025 COMPLETE CBC W/AUTO DIFF WBC: CPT | Performed by: EMERGENCY MEDICINE

## 2019-03-22 PROCEDURE — 93005 ELECTROCARDIOGRAM TRACING: CPT

## 2019-03-22 PROCEDURE — 83735 ASSAY OF MAGNESIUM: CPT | Performed by: HOSPITALIST

## 2019-03-22 PROCEDURE — 96374 THER/PROPH/DIAG INJ IV PUSH: CPT

## 2019-03-22 PROCEDURE — 21000001 ZZH R&B HEART CARE

## 2019-03-22 PROCEDURE — 96376 TX/PRO/DX INJ SAME DRUG ADON: CPT

## 2019-03-22 PROCEDURE — 25000125 ZZHC RX 250: Performed by: EMERGENCY MEDICINE

## 2019-03-22 PROCEDURE — 96375 TX/PRO/DX INJ NEW DRUG ADDON: CPT

## 2019-03-22 PROCEDURE — 71046 X-RAY EXAM CHEST 2 VIEWS: CPT

## 2019-03-22 PROCEDURE — 84443 ASSAY THYROID STIM HORMONE: CPT | Performed by: EMERGENCY MEDICINE

## 2019-03-22 PROCEDURE — 80048 BASIC METABOLIC PNL TOTAL CA: CPT | Performed by: EMERGENCY MEDICINE

## 2019-03-22 RX ORDER — SODIUM FLUORIDE 5 MG/ML
PASTE, DENTIFRICE DENTAL 3 TIMES DAILY
COMMUNITY
End: 2022-01-01

## 2019-03-22 RX ORDER — WARFARIN SODIUM 4 MG/1
4 TABLET ORAL DAILY
COMMUNITY
End: 2019-05-08

## 2019-03-22 RX ORDER — AMOXICILLIN 250 MG
1 CAPSULE ORAL 2 TIMES DAILY PRN
Status: DISCONTINUED | OUTPATIENT
Start: 2019-03-22 | End: 2019-03-28 | Stop reason: HOSPADM

## 2019-03-22 RX ORDER — FUROSEMIDE 10 MG/ML
40 INJECTION INTRAMUSCULAR; INTRAVENOUS ONCE
Status: COMPLETED | OUTPATIENT
Start: 2019-03-22 | End: 2019-03-22

## 2019-03-22 RX ORDER — ONDANSETRON 2 MG/ML
4 INJECTION INTRAMUSCULAR; INTRAVENOUS EVERY 6 HOURS PRN
Status: DISCONTINUED | OUTPATIENT
Start: 2019-03-22 | End: 2019-03-28 | Stop reason: HOSPADM

## 2019-03-22 RX ORDER — ONDANSETRON 4 MG/1
4 TABLET, ORALLY DISINTEGRATING ORAL EVERY 6 HOURS PRN
Status: DISCONTINUED | OUTPATIENT
Start: 2019-03-22 | End: 2019-03-28 | Stop reason: HOSPADM

## 2019-03-22 RX ORDER — POLYETHYLENE GLYCOL 3350 17 G/17G
17 POWDER, FOR SOLUTION ORAL DAILY PRN
Status: DISCONTINUED | OUTPATIENT
Start: 2019-03-22 | End: 2019-03-28 | Stop reason: HOSPADM

## 2019-03-22 RX ORDER — METOPROLOL TARTRATE 1 MG/ML
5 INJECTION, SOLUTION INTRAVENOUS
Status: COMPLETED | OUTPATIENT
Start: 2019-03-22 | End: 2019-03-22

## 2019-03-22 RX ORDER — WARFARIN SODIUM 4 MG/1
2 TABLET ORAL DAILY
COMMUNITY
End: 2019-05-08

## 2019-03-22 RX ORDER — OXYCODONE HYDROCHLORIDE 5 MG/1
15 TABLET ORAL EVERY 8 HOURS PRN
Status: DISCONTINUED | OUTPATIENT
Start: 2019-03-22 | End: 2019-03-28 | Stop reason: HOSPADM

## 2019-03-22 RX ORDER — ACETAMINOPHEN 650 MG/1
650 SUPPOSITORY RECTAL EVERY 4 HOURS PRN
Status: DISCONTINUED | OUTPATIENT
Start: 2019-03-22 | End: 2019-03-28 | Stop reason: HOSPADM

## 2019-03-22 RX ORDER — FUROSEMIDE 10 MG/ML
20 INJECTION INTRAMUSCULAR; INTRAVENOUS
Status: DISCONTINUED | OUTPATIENT
Start: 2019-03-23 | End: 2019-03-25

## 2019-03-22 RX ORDER — ACETAMINOPHEN 325 MG/1
650 TABLET ORAL EVERY 4 HOURS PRN
Status: DISCONTINUED | OUTPATIENT
Start: 2019-03-22 | End: 2019-03-28 | Stop reason: HOSPADM

## 2019-03-22 RX ORDER — LATANOPROST 50 UG/ML
1 SOLUTION/ DROPS OPHTHALMIC DAILY
Status: DISCONTINUED | OUTPATIENT
Start: 2019-03-23 | End: 2019-03-28 | Stop reason: HOSPADM

## 2019-03-22 RX ORDER — CARBOXYMETHYLCELLULOSE SODIUM 5 MG/ML
1 SOLUTION/ DROPS OPHTHALMIC 4 TIMES DAILY PRN
Status: DISCONTINUED | OUTPATIENT
Start: 2019-03-22 | End: 2019-03-28 | Stop reason: HOSPADM

## 2019-03-22 RX ORDER — LANOLIN ALCOHOL/MO/W.PET/CERES
3 CREAM (GRAM) TOPICAL
Status: DISCONTINUED | OUTPATIENT
Start: 2019-03-22 | End: 2019-03-28 | Stop reason: HOSPADM

## 2019-03-22 RX ORDER — METOPROLOL SUCCINATE 100 MG/1
100 TABLET, EXTENDED RELEASE ORAL 2 TIMES DAILY
Status: CANCELLED | OUTPATIENT
Start: 2019-03-23

## 2019-03-22 RX ORDER — CARBOXYMETHYLCELLULOSE SODIUM 5 MG/ML
1 SOLUTION/ DROPS OPHTHALMIC 4 TIMES DAILY PRN
COMMUNITY

## 2019-03-22 RX ORDER — LIDOCAINE 40 MG/G
CREAM TOPICAL
Status: DISCONTINUED | OUTPATIENT
Start: 2019-03-22 | End: 2019-03-28 | Stop reason: HOSPADM

## 2019-03-22 RX ORDER — ACYCLOVIR 400 MG/1
400 TABLET ORAL 2 TIMES DAILY
Status: DISCONTINUED | OUTPATIENT
Start: 2019-03-23 | End: 2019-03-28 | Stop reason: HOSPADM

## 2019-03-22 RX ORDER — BISACODYL 10 MG
10 SUPPOSITORY, RECTAL RECTAL DAILY PRN
Status: DISCONTINUED | OUTPATIENT
Start: 2019-03-22 | End: 2019-03-28 | Stop reason: HOSPADM

## 2019-03-22 RX ORDER — AMOXICILLIN 250 MG
2 CAPSULE ORAL 2 TIMES DAILY PRN
Status: DISCONTINUED | OUTPATIENT
Start: 2019-03-22 | End: 2019-03-28 | Stop reason: HOSPADM

## 2019-03-22 RX ORDER — NALOXONE HYDROCHLORIDE 0.4 MG/ML
.1-.4 INJECTION, SOLUTION INTRAMUSCULAR; INTRAVENOUS; SUBCUTANEOUS
Status: DISCONTINUED | OUTPATIENT
Start: 2019-03-22 | End: 2019-03-28 | Stop reason: HOSPADM

## 2019-03-22 RX ADMIN — METOPROLOL TARTRATE 5 MG: 5 INJECTION, SOLUTION INTRAVENOUS at 21:03

## 2019-03-22 RX ADMIN — METOPROLOL TARTRATE 5 MG: 5 INJECTION, SOLUTION INTRAVENOUS at 21:17

## 2019-03-22 RX ADMIN — METOPROLOL TARTRATE 5 MG: 5 INJECTION, SOLUTION INTRAVENOUS at 21:49

## 2019-03-22 RX ADMIN — FUROSEMIDE 40 MG: 10 INJECTION, SOLUTION INTRAVENOUS at 21:17

## 2019-03-22 ASSESSMENT — ENCOUNTER SYMPTOMS
SHORTNESS OF BREATH: 1
PALPITATIONS: 1
UNEXPECTED WEIGHT CHANGE: 1

## 2019-03-23 ENCOUNTER — APPOINTMENT (OUTPATIENT)
Dept: CARDIOLOGY | Facility: CLINIC | Age: 84
DRG: 308 | End: 2019-03-23
Attending: HOSPITALIST
Payer: MEDICARE

## 2019-03-23 LAB
ANION GAP SERPL CALCULATED.3IONS-SCNC: 8 MMOL/L (ref 3–14)
BASOPHILS # BLD AUTO: 0 10E9/L (ref 0–0.2)
BASOPHILS NFR BLD AUTO: 0.2 %
BUN SERPL-MCNC: 15 MG/DL (ref 7–30)
CALCIUM SERPL-MCNC: 8.8 MG/DL (ref 8.5–10.1)
CHLORIDE SERPL-SCNC: 111 MMOL/L (ref 94–109)
CO2 SERPL-SCNC: 26 MMOL/L (ref 20–32)
CREAT SERPL-MCNC: 0.74 MG/DL (ref 0.52–1.04)
DIFFERENTIAL METHOD BLD: ABNORMAL
EOSINOPHIL # BLD AUTO: 0.1 10E9/L (ref 0–0.7)
EOSINOPHIL NFR BLD AUTO: 1.4 %
ERYTHROCYTE [DISTWIDTH] IN BLOOD BY AUTOMATED COUNT: 14.8 % (ref 10–15)
GFR SERPL CREATININE-BSD FRML MDRD: 73 ML/MIN/{1.73_M2}
GLUCOSE SERPL-MCNC: 113 MG/DL (ref 70–99)
HCT VFR BLD AUTO: 32.7 % (ref 35–47)
HGB BLD-MCNC: 10.6 G/DL (ref 11.7–15.7)
IMM GRANULOCYTES # BLD: 0 10E9/L (ref 0–0.4)
IMM GRANULOCYTES NFR BLD: 0.2 %
INR PPP: 2.19 (ref 0.86–1.14)
LYMPHOCYTES # BLD AUTO: 0.8 10E9/L (ref 0.8–5.3)
LYMPHOCYTES NFR BLD AUTO: 11.9 %
MAGNESIUM SERPL-MCNC: 2 MG/DL (ref 1.6–2.3)
MCH RBC QN AUTO: 31.3 PG (ref 26.5–33)
MCHC RBC AUTO-ENTMCNC: 32.4 G/DL (ref 31.5–36.5)
MCV RBC AUTO: 97 FL (ref 78–100)
MONOCYTES # BLD AUTO: 1.3 10E9/L (ref 0–1.3)
MONOCYTES NFR BLD AUTO: 19.5 %
NEUTROPHILS # BLD AUTO: 4.4 10E9/L (ref 1.6–8.3)
NEUTROPHILS NFR BLD AUTO: 66.8 %
NRBC # BLD AUTO: 0 10*3/UL
NRBC BLD AUTO-RTO: 0 /100
PLATELET # BLD AUTO: 138 10E9/L (ref 150–450)
POTASSIUM SERPL-SCNC: 3.5 MMOL/L (ref 3.4–5.3)
RBC # BLD AUTO: 3.39 10E12/L (ref 3.8–5.2)
SODIUM SERPL-SCNC: 145 MMOL/L (ref 133–144)
TROPONIN I SERPL-MCNC: 0.02 UG/L (ref 0–0.04)
TROPONIN I SERPL-MCNC: 0.02 UG/L (ref 0–0.04)
TROPONIN I SERPL-MCNC: <0.015 UG/L (ref 0–0.04)
WBC # BLD AUTO: 6.6 10E9/L (ref 4–11)

## 2019-03-23 PROCEDURE — A9270 NON-COVERED ITEM OR SERVICE: HCPCS | Mod: GY | Performed by: HOSPITALIST

## 2019-03-23 PROCEDURE — 36415 COLL VENOUS BLD VENIPUNCTURE: CPT | Performed by: HOSPITALIST

## 2019-03-23 PROCEDURE — 80048 BASIC METABOLIC PNL TOTAL CA: CPT | Performed by: HOSPITALIST

## 2019-03-23 PROCEDURE — 84484 ASSAY OF TROPONIN QUANT: CPT | Performed by: HOSPITALIST

## 2019-03-23 PROCEDURE — 93306 TTE W/DOPPLER COMPLETE: CPT

## 2019-03-23 PROCEDURE — 21000001 ZZH R&B HEART CARE

## 2019-03-23 PROCEDURE — 85610 PROTHROMBIN TIME: CPT | Performed by: HOSPITALIST

## 2019-03-23 PROCEDURE — 99222 1ST HOSP IP/OBS MODERATE 55: CPT | Performed by: INTERNAL MEDICINE

## 2019-03-23 PROCEDURE — 25000125 ZZHC RX 250: Performed by: HOSPITALIST

## 2019-03-23 PROCEDURE — 25000132 ZZH RX MED GY IP 250 OP 250 PS 637: Mod: GY | Performed by: HOSPITALIST

## 2019-03-23 PROCEDURE — 25000132 ZZH RX MED GY IP 250 OP 250 PS 637: Mod: GY | Performed by: INTERNAL MEDICINE

## 2019-03-23 PROCEDURE — 93306 TTE W/DOPPLER COMPLETE: CPT | Mod: 26 | Performed by: INTERNAL MEDICINE

## 2019-03-23 PROCEDURE — A9270 NON-COVERED ITEM OR SERVICE: HCPCS | Mod: GY | Performed by: INTERNAL MEDICINE

## 2019-03-23 PROCEDURE — 25800030 ZZH RX IP 258 OP 636: Performed by: HOSPITALIST

## 2019-03-23 PROCEDURE — 25000128 H RX IP 250 OP 636: Performed by: HOSPITALIST

## 2019-03-23 PROCEDURE — 99232 SBSQ HOSP IP/OBS MODERATE 35: CPT | Performed by: HOSPITALIST

## 2019-03-23 PROCEDURE — 99221 1ST HOSP IP/OBS SF/LOW 40: CPT | Performed by: INTERNAL MEDICINE

## 2019-03-23 PROCEDURE — 85025 COMPLETE CBC W/AUTO DIFF WBC: CPT | Performed by: HOSPITALIST

## 2019-03-23 RX ORDER — METOPROLOL SUCCINATE 50 MG/1
50 TABLET, EXTENDED RELEASE ORAL 2 TIMES DAILY
Status: DISCONTINUED | OUTPATIENT
Start: 2019-03-23 | End: 2019-03-24

## 2019-03-23 RX ORDER — WARFARIN SODIUM 4 MG/1
4 TABLET ORAL
Status: COMPLETED | OUTPATIENT
Start: 2019-03-23 | End: 2019-03-23

## 2019-03-23 RX ADMIN — LATANOPROST 1 DROP: 50 SOLUTION OPHTHALMIC at 00:15

## 2019-03-23 RX ADMIN — DILTIAZEM HYDROCHLORIDE 5 MG/HR: 5 INJECTION INTRAVENOUS at 00:15

## 2019-03-23 RX ADMIN — BRINZOLAMIDE/BRIMONIDINE TARTRATE 1 DROP: 10; 2 SUSPENSION/ DROPS OPHTHALMIC at 00:14

## 2019-03-23 RX ADMIN — FUROSEMIDE 20 MG: 10 INJECTION, SOLUTION INTRAVENOUS at 11:06

## 2019-03-23 RX ADMIN — BRINZOLAMIDE/BRIMONIDINE TARTRATE 1 DROP: 10; 2 SUSPENSION/ DROPS OPHTHALMIC at 21:17

## 2019-03-23 RX ADMIN — METOPROLOL SUCCINATE 50 MG: 50 TABLET, EXTENDED RELEASE ORAL at 21:16

## 2019-03-23 RX ADMIN — ACYCLOVIR 400 MG: 400 TABLET ORAL at 11:03

## 2019-03-23 RX ADMIN — MELATONIN 3 MG: 3 TAB ORAL at 00:18

## 2019-03-23 RX ADMIN — MELATONIN 3 MG: 3 TAB ORAL at 21:24

## 2019-03-23 RX ADMIN — FUROSEMIDE 20 MG: 10 INJECTION, SOLUTION INTRAVENOUS at 15:51

## 2019-03-23 RX ADMIN — WARFARIN SODIUM 4 MG: 4 TABLET ORAL at 18:21

## 2019-03-23 RX ADMIN — BRINZOLAMIDE/BRIMONIDINE TARTRATE 1 DROP: 10; 2 SUSPENSION/ DROPS OPHTHALMIC at 11:13

## 2019-03-23 RX ADMIN — DILTIAZEM HYDROCHLORIDE 10 MG/HR: 5 INJECTION INTRAVENOUS at 23:42

## 2019-03-23 RX ADMIN — ACYCLOVIR 400 MG: 400 TABLET ORAL at 21:16

## 2019-03-23 RX ADMIN — METOPROLOL TARTRATE 12.5 MG: 25 TABLET, FILM COATED ORAL at 15:45

## 2019-03-23 RX ADMIN — LATANOPROST 1 DROP: 50 SOLUTION OPHTHALMIC at 11:04

## 2019-03-23 ASSESSMENT — ACTIVITIES OF DAILY LIVING (ADL)
AMBULATION: 0-->INDEPENDENT
ADLS_ACUITY_SCORE: 14
ADLS_ACUITY_SCORE: 14
ADLS_ACUITY_SCORE: 16
RETIRED_EATING: 0-->INDEPENDENT
FALL_HISTORY_WITHIN_LAST_SIX_MONTHS: NO
DRESS: 0-->INDEPENDENT
ADLS_ACUITY_SCORE: 19
RETIRED_COMMUNICATION: 0-->UNDERSTANDS/COMMUNICATES WITHOUT DIFFICULTY
SWALLOWING: 0-->SWALLOWS FOODS/LIQUIDS WITHOUT DIFFICULTY
COGNITION: 0 - NO COGNITION ISSUES REPORTED
ADLS_ACUITY_SCORE: 14
BATHING: 0-->INDEPENDENT
TRANSFERRING: 0-->INDEPENDENT
TOILETING: 0-->INDEPENDENT
ADLS_ACUITY_SCORE: 17

## 2019-03-23 NOTE — CONSULTS
Consult Date:  2019      REQUESTING PHYSICIAN:  This consult has been requested by Dr. Vidal for multiple myeloma.      HISTORY OF PRESENT ILLNESS:  Mrs. Arreola is an 86-year-old female with multiple myeloma.  She was diagnosed with kappa free light chain multiple myeloma in 2016.  She has been on multiple different treatments since then.  She is currently on daratumumab, Velcade and dexamethasone since 2017.  Last treatment was on 2019.  Her multiple myeloma is under control.      The patient presented to the emergency room on 2019 because of shortness of breath.  For about 2 weeks, she has been slowly getting more short of breath.  She has been having worsening pedal edema.  She had multiple investigations done in the ER.   -CBC revealed mild anemia.  Normal WBC and platelets.   -BMP was essentially normal.   -BNP elevated.   -Chest x-ray revealed moderate-sized bilateral pleural effusion.  There is superimposed bibasilar opacities.      The patient has worsening heart failure secondary to atrial fibrillation.  She is being followed by cardiologist.  Echocardiogram reveals ejection fraction of 55-60%.  There is moderate mitral regurgitation and tricuspid regurgitation.  There is severe pulmonary hypertension.      REVIEW OF SYSTEMS:  The patient feels weak.  No headache.  No dizziness.  No chest pain.  Shortness of breath is slightly better.  No nausea or vomiting.  She has chronic back pain.  No worsening of it.  No bleeding from any site.  No fever or chills.      ALLERGIES:  Reviewed.      MEDICATIONS:  Reviewed.      PAST MEDICAL HISTORY:   1.  Multiple myeloma.   2.  Congestive heart failure.   3.  Osteopenia.   4.  Hypertension.   5.  Atrial fibrillation.   6.  Bilateral knee replacement.   7.  .   8. Glaucoma.   9.  Compression fracture.      SOCIAL HISTORY:   -She is a retired CRNA.   -She is .   -Occasional alcohol use.      PHYSICAL EXAMINATION:   GENERAL:  She  is alert, oriented x3.   VITAL SIGNS: Reviewed.  ECOG PS of 2.   The rest of the systems not examined.      LABORATORY DATA:  Reviewed.      ASSESSMENT:   1.  An 86-year-old female with multiple myeloma admitted with congestive heart failure.   2.  Acute exacerbation of CHF secondary to atrial fibrillation with a rapid ventricular rate.    3.  Kappa free light chain multiple myeloma on treatment with daratumumab, Velcade and dexamethasone.  Myeloma is stable.   4.  Chronic back pain.   5.  Normocytic anemia, stable.      RECOMMENDATIONS:   1.  The patient has been admitted with shortness of breath.  This is secondary to congestive heart failure.  Cardiology is following.  Treatment as per Cardiology team and hospitalist team.   2.  The patient has multiple myeloma.  She has been stable from multiple myeloma.  She is on active treatment with daratumumab, dexamethasone and Velcade. She gets treatment every 2 weeks. Next treatment is on 2019.  Depending on her recovery from current hospitalization, she may get treatment next week or we may delay it. I reassured the patient that her current shortness of breath is unrelated to her multiple myeloma. It is due to primary cardiac disease.    3.  The patient had a few questions, which were all answered.  We will see her intermittently in the hospital.      Total time spent 45 minutes, more than 50% of the time spent in counseling and coordination of care.         ITZ LOPEZ MD             D: 2019   T: 2019   MT: AS      Name:     ALBERTO PARSON   MRN:      [id number]        Account:       [id number]   :      1932           Consult Date:  2019      Document: [id number]       cc: Carter Vidal MD

## 2019-03-23 NOTE — ED NOTES
"[facility]  ED Nurse Handoff Report    ED Chief complaint: Shortness of Breath (Worsening SOB last few days, been in A fib for last 9 days with worsening leg swelling.  Reports weight gain over one week but not sure how much. )      ED Diagnosis:   Final diagnoses:   A-fib (H)       Code Status: hospitalist to address    Allergies:   Allergies   Allergen Reactions     Blood Transfusion Related (Informational Only)      Blood antigens related to receiving Darzelex-AG     Penicillin [Penicillins] Rash     Blotches on chest        Activity level - Baseline/Home:  Stand with Assist    Activity Level - Current:   Stand with Assist     Needed?: No    Isolation: No  Infection: Not Applicable  Bariatric?: No    Vital Signs:   Vitals:    03/22/19 2115 03/22/19 2120 03/22/19 2145 03/22/19 2200   BP: (!) 114/91 109/88 (!) 137/103 (!) 122/94   Pulse: 128 116 (!) 41 140   Resp: 20 17 (!) 43 24   Temp:       TempSrc:       SpO2: 99% 100% (!) 83% 100%   Height:           Cardiac Rhythm: ,   Cardiac  Cardiac Rhythm: Atrial fibrillation    Pain level:      Is this patient confused?: No   Does this patient have a guardian?  No         If yes, is there guardianship documents in the Epic \"Code/ACP\" activity?  N/A         Guardian Notified?  N/A  [place] - Suicide Severity Rating Scale Completed?  Yes  If yes, what color did the patient score?  White    Patient Report: Initial Complaint:   weakness  Focused Assessment: Patient complaining of shortness of breath worsening over the last 9 days. In AFIB upon arrival with a rate in the 140s.  Irregular and in the oneteens after 3 doses of metoprolol. Patient does have +4 pitting edema to the lower extremities  Tests Performed: imaging, labs  Abnormal Results:   Labs Ordered and Resulted from Time of ED Arrival Up to the Time of Departure from the ED   BASIC METABOLIC PANEL - Abnormal; Notable for the following components:       Result Value    Chloride 110 (*)  "    Glucose 108 (*)     All other components within normal limits   CBC WITH PLATELETS DIFFERENTIAL - Abnormal; Notable for the following components:    RBC Count 3.68 (*)     Hemoglobin 11.4 (*)     All other components within normal limits   INR - Abnormal; Notable for the following components:    INR 2.16 (*)     All other components within normal limits   NT PROBNP INPATIENT - Abnormal; Notable for the following components:    N-Terminal Pro BNP Inpatient 4,828 (*)     All other components within normal limits   TSH WITH FREE T4 REFLEX     Treatments provided: metoprolol     Family Comments: daughter at bedside    OBS brochure/video discussed/provided to patient/family: N/A              Name of person given brochure if not patient:               Relationship to patient:     ED Medications:   Medications   metoprolol (LOPRESSOR) injection 5 mg (5 mg Intravenous Given 3/22/19 2149)   furosemide (LASIX) injection 40 mg (40 mg Intravenous Given 3/22/19 2117)       Drips infusing?:  No    For the majority of the shift this patient was Green.   Interventions performed were .    Severe Sepsis OR Septic Shock Diagnosis Present: No    To be done/followed up on inpatient unit:      ED NURSE PHONE NUMBER: *[phone]

## 2019-03-23 NOTE — PLAN OF CARE
Pt A and O x4, but forgetful. VSS overnight. Pain free. A-fib controlled on 5mg/hr dilt gtt. Difficult to get adequate I and O as pt is incontinent. Pt is putting out large amounts of urine. Up SBA. Plan for cards consult, hem/onc consult, and echo today.

## 2019-03-23 NOTE — ED PROVIDER NOTES
History   Chief Complaint:  Shortness of Breath     HPI   Amira Arreola is an anticoagulated 86 year old female with a complex history notable for multiple myeloma who presents with her daughter with shortness of breath. The patient's daughter reports two weeks ago the patient developed increasing shortness of breath and fluid build-up in her legs with significant swelling and weight gain. The patient states that she does have the shortness of breath at rest. She also notes that for the past nine days the patient has had an abnormal heart rate she thought may be atrial fibrillation. The patient does have a history of atrial fibrillation as well as multiple myeloma for which she is currently receiving treatment. She is also anticoagulated with her most INR being 2.60. The patient denies chest pain.    Allergies:  Penicillin     Medications:    Acyclovir   Calcium citrate-vitamin D  Carboxymethylcellulose  Vitamin D3  Clarinex  Decadron  Ativan  Metoprolol succinate  Oxycodone  Miralax/Glycolax   Refresh OP  Compazine  Coumadin  Zoledronic acid    Past Medical History:    Ascending aorta dilation  Cancer, metastatic to bone  Colonic polyp  Compression fracture  Dry eyes  Elevated MCV  Benign essential hypertension  Hyperlipidemia   Lung nodule  Menorrhagia  Monoclonal gammopathy of unknown significance   Multiple myeloma not having achieved remission  Overactive bladder  Osteoporosis  Palpitations  Paroxysmal atrial fibrillation  Sciatica of left side  Shingles  SVT  Thrombocytopenia     Past Surgical History:    Bone marrow biopsy, bone specimen, needle/trocar  Cataract IOL RT/LT   section  Excise exostosis tibia/fibula  Hysteroscopy and D & C  Left ankle replacement   Right ankle surgery    Family History:    Heart disease  CAD  Cerebrovascular disease    Social History:  Smoking status: Never smoker  Alcohol use: No  Marital Status:   [2]    Review of Systems   Constitutional: Positive for  "unexpected weight change (weight gain).   Respiratory: Positive for shortness of breath.    Cardiovascular: Positive for palpitations and leg swelling. Negative for chest pain.   All other systems reviewed and are negative.    Physical Exam   Patient Vitals for the past 24 hrs:   BP Temp Temp src Pulse Heart Rate Resp SpO2 Height   03/22/19 2245 (!) 127/96 -- -- 128 116 16 100 % --   03/22/19 2215 (!) 128/94 -- -- 117 107 16 99 % --   03/22/19 2200 (!) 122/94 -- -- 140 140 24 100 % --   03/22/19 2145 (!) 137/103 -- -- (!) 41 -- (!) 43 (!) 83 % --   03/22/19 2120 109/88 -- -- 116 117 17 100 % --   03/22/19 2115 (!) 114/91 -- -- 128 128 20 99 % --   03/22/19 2100 121/89 -- -- 130 130 13 100 % --   03/22/19 2031 (!) 126/101 98.1  F (36.7  C) Oral 150 -- 18 96 % 1.651 m (5' 5\")     Physical Exam  General: Alert and cooperative with exam. Patient in mild distress. Normal mentation.  Head:  Scalp is NC/AT  Eyes:  No scleral icterus, PERRL  ENT:  The external nose and ears are normal. The oropharynx is normal and without erythema; mucus membranes are moist. Uvula midline, no evidence of deep space infection.  Neck:  Normal range of motion without rigidity.  CV:  Tachycardic and irregular    No pathologic murmur   Resp:  Absent lung sounds in bases with faint crackles inupper lobes    Non-labored, no retractions or accessory muscle use  GI:  Abdomen is soft, no distension, no tenderness. No peritoneal signs  MS:  2+ pitting edema to lower extremities.  Skin:  Warm and dry, No rash or lesions noted.  Neuro: Oriented x 3. No gross motor deficits.    Emergency Department Course   ECG (20:43:29):  Rate 129 bpm. WY interval *. QRS duration 78. QT/QTc 286/418. P-R-T axes * 2 23. Atrial fibrillation with RVR. Nonspecific ST and T wave abnormalities. The above is changed as compared to prior EKG taken earlier today at 2035. Interpreted at 2058 by Carlito Vann DO.    ECG (20:35:52):  Rate 150 bpm. WY interval *. QRS " duration 72. QT/QTc 288/455. P-R-T axes * -6 -1. Supraventricular tachycardia. Atrial fibrillation/flutter. Cannot rule out Anterior infarct, age undetermined. The above are changes as compared to prior EKG dated 06/23/2017. Interpreted at 2040 by Carlito Vann DO.    Imaging:  Radiographic findings were communicated with the patient who voiced understanding of the findings.    XR Chest 2 Views:  Lungs are hypoinflated. Medium-sized bilateral pleural  effusions are present, worsened compared to 9/19/2018. Superimposed  bibasilar opacities are present, presumably atelectasis. Underlying  infection, aspiration, or malignancy cannot be excluded. Right chest  port catheter tip remains within the right atrium. Heart size is not  appreciably changed. Mid thoracic compression fracture is unchanged.  As read by Radiology.     Laboratory:  CBC: HGB 11.4 (L) o/w WNL (WBC 8.1, )  BMP: Chloride 110 (H), Glucose 108 (H) o/w WNL (Creatinine 0.76)  TSH with free T4 Reflex: 2.31  INR: 2.16 (H)  ProBNP: 4,828 (H)    Interventions:  2117: Lasix 40 mg IV  2149: Metoprolol 5 mg IV x 3    Emergency Department Course:  Past medical records, nursing notes, and vitals reviewed.   2034: I performed an exam of the patient and obtained history, as documented above.  IV inserted and blood drawn.    2035: EKG obtained, results above.    2043: EKG obtained, results above.    The patient was sent for a chest x-ray while in the emergency department, findings above.    1957: I rechecked the patient. Explained findings to the patient.    2206: I spoke to Dr. Vidal of the hospitalist service who accepts the patient for admission.     Findings and plan explained to the patient who consents to admission.     Discussed the patient with Dr. Vidal, who will admit the patient to a [facility] bed for further monitoring, evaluation, and treatment.     Impression & Plan    Medical Decision Making:  Patient is a 86-year-old female presents with  progressively worsening shortness of breath and lower extremity swelling; history of atrial fibrillation and multiple myeloma; anticoagulated on warfarin.  Patient's medical history and records were reviewed.  Initial consideration for, but not limited to, arrhythmia, electrolyte abnormality, CHF exacerbation, infectious process, thyroid dysfunction, among others.  Labs, EKG, and imaging was obtained.  EKG demonstrated atrial fibrillation with RVR.  Chest x-ray with bilateral pleural effusions.  Patient denied recent infectious symptoms; afebrile.  She was noted to have significant lower extremity pitting edema was provided 40 mg IV Lasix.  Labs notable for elevated BNP, therapeutic INR, and mild chronic anemia.  Considered PE however less likely given therapeutic INR.  She was provided repeated doses of IV metoprolol with improvement in patient's heart rate.  Heart rate improved from the 150s to the low 100s at time of admission.  Given likely heart failure exacerbation in setting of A. fib with RVR [place] [facility] [place] service for further evaluation and care.    Diagnosis:    ICD-10-CM    1. Atrial fibrillation with RVR (H) I48.91    2. Pleural effusion J90    3. Acute on chronic congestive heart failure, unspecified heart failure type (H) I50.9        Disposition:  [place] [facility] [place] care of Dr. Vidal.       Curtis Cabezas  3/22/2019    EMERGENCY DEPARTMENT  I, Curtis Cabezas, am serving as a scribe at 8:34 PM on 3/22/2019 to document services personally performed by Carlito Vann DO based on my observations and the provider's statements to me.        Carlito Vann DO  03/23/19 0031

## 2019-03-23 NOTE — CONSULTS
[facility]    Cardiology Consultation     Date of Admission:  3/22/2019    Assessment & Plan   Amira Arreola is a 86 year old female who was admitted on 3/22/2019. I was asked to see the patient for atrial fibrillation and heart failure.    1.  Chronic atrial fibrillation with current rapid ventricular response  2.  Chronic anticoagulation with Coumadin  3.  Acute on chronic heart failure with preserved ejection fraction  4.  Moderate mitral and tricuspid valve insufficiency  5.  Hypertension  6.  Metastatic multiple myeloma    It was a pleasure seeing Mrs. Arreola today with the cardiology consult service.  I reviewed these recommendations with her in detail in the hospital today.    I expect that she went into atrial fibrillation with rapid response around 3 days ago leading to worsening diastolic heart failure and her current presentation.  Her prior to admission metoprolol succinate was held given the IV metoprolol and diltiazem she was on.  With discontinuation of the diltiazem drip, her rates have increased again to the 120s-130s.  We will resume her diltiazem drip tonight and I will resume her oral metoprolol dose tomorrow as her heart failure symptoms are improving.  We will attempt to wean the diltiazem drip off again tomorrow.  She should continue on Coumadin for anticoagulation.    Otherwise, she has diuresed some but has significant volume overload still on exam.  Currently has Lasix 20 mg IV ordered twice a day.  We will determine her diuresis today and potentially increase this tomorrow if indicated.  I discussed with her that I expect that she has likely 10-20 pounds of excess fluid on board and that he will likely take into early next week before we get her back to a euvolemic state.    Cardiology will continue to follow along.  Please page with any questions or concerns.    Jake Daniel MD    Code Status    Full Code    Reason for Consult   Reason for consult: Atrial  fibrillation and CHF    Primary Care Physician   Addy Frias    Chief Complaint   AFib and CHF    History is obtained from the patient    History of Present Illness   Amira Arreloa is a 86 year old female who presents with atrial fibrillation with rapid ventricular response and shortness of breath.  She has a past medical history of chronic atrial fibrillation on anticoagulation with Coumadin, hypertension, multiple myeloma with metastasis to bone, and multiple prior orthopedic surgeries.  She presented to the hospital with worsening dyspnea on exertion as well as lower extremity edema and palpitations.    In the emergency department, she was found to be in atrial fibrillation with rapid ventricular response and rates into the 150s-160s.  She was initially placed on IV metoprolol and then transition to IV diltiazem.  Her rates improved this morning and the primary service discontinued the diltiazem and start her back on the low-dose metoprolol.  Her troponins were minimally elevated at 0.016 but flat.  Her NT proBNP is significantly elevated at 4828.  A chest x-ray shows atelectasis versus edema and I would favor edema.  She was given IV Lasix and feels somewhat better at this point.    In speaking to her, she does say she feels a little bit better but her legs which were previously very tight feel better as well.  She still has significant lower extremity edema.  She denies any current shortness of breath at rest but states that with minimal exertion at home she was having significant shortness of breath.  She describes atypical chest pain which does not sound cardiac.  She is unaware of her atrial fibrillation and states that she does not typically feel any sensation of palpitations.  She states that all of her symptoms have gotten worse over the past 3 days.  She denies any current paroxysmal nocturnal dyspnea.    Past Medical History   I have reviewed this patient's medical history and updated it with  pertinent information if needed.   Past Medical History:   Diagnosis Date     Abnormal CXR     then ct done and not significant     Ascending aorta dilatation (H) 2016    on echo, mild, fu  4.0, slightly larger     Cancer, metastatic to bone (H)     due to myeloma     Colonic polyp     adenomatous, fu  tics only     Compression fracture     multiple areas of spine     Dry eyes      Elevated MCV     b12 and folic acid nl     HTN (hypertension) 2000    off meds for years     Lung nodule 2018    on ct, 4mm, ct done for fu abnl cxr     Menorrhagia     hysteroscopy and d and c done     MGUS (monoclonal gammopathy of unknown significance)     eval by Dr. Roberts     Multiple myeloma (H)     dx  at [place], bone lesions seen on mri      OAB (overactive bladder)     Dr. Grullon     Osteoporosis     fu done  and stable, went off meds then, fu done ; has had gyn fu and added evista  by gyn     Palpitations     nl echo, mildly dilated asc aorta     Paroxysmal atrial fibrillation (H)     had palp and ziopatch showed it, echo nl lv fxn, mild mr and tr, added coum and toprol, toprol dose raised 16     Sciatica of left side     Dr. Helen Ricardo      SVT (supraventricular tachycardia) (H)     on ziopatch     Thrombocytopenia (H)        Past Surgical History   I have reviewed this patient's surgical history and updated it with pertinent information if needed.  Past Surgical History:   Procedure Laterality Date     BONE MARROW BIOPSY, BONE SPECIMEN, NEEDLE/TROCAR N/A 2016    Procedure: BIOPSY BONE MARROW;  Surgeon: Bryan Patel MD;  Location:  GI     CATARACT IOL, RT/LT        SECTION  ,      COLONOSCOPY  2013    Procedure: COLONOSCOPY;  COLONOSCOPY;  Surgeon: Steffany Rockwell MD;  Location:  GI     EXCISE EXOSTOSIS TIBIA / FIBULA  2014    Procedure: EXCISE EXOSTOSIS TIBIA /  FIBULA;  Surgeon: Naila Pichardo MD;  Location: [facility]     hysteroscopy and d and c      due to bleeding     left anle replacement       right ankle surgery         Prior to Admission Medications   Prior to Admission Medications   Prescriptions Last Dose Informant Patient Reported? Taking?   Acetaminophen (TYLENOL PO) prn Self Yes Yes   Sig: Take 500 mg by mouth every 6 hours as needed for mild pain or fever   Calcium Citrate-Vitamin D (CALCIUM CITRATE + PO)  Self Yes No   Sig: Take 2 tablets by mouth daily    Carboxymethylcellulose Sod PF (REFRESH PLUS) 0.5 % SOLN ophthalmic solution prn Self Yes Yes   Si drop 4 times daily as needed for dry eyes   Multiple Vitamin (DAILY MULTIVITAMIN PO) 3/22/2019 at Unknown time Self Yes Yes   Sig: Take 1 tablet by mouth daily.   SIMBRINZA 1-0.2 % ophthalmic suspension 3/22/2019 at am Self Yes Yes   Sig: Place 1 drop into the right eye 2 times daily 1 drop AM and PM   Sodium Fluoride (SF 5000 PLUS) 1.1 % CREA 3/22/2019 at Unknown time Self Yes Yes   Sig: Apply to affected area 3 times daily   Zoledronic Acid (ZOMETA IV) Past Month at Unknown time Self Yes Yes   Sig: Inject into the vein every 3 months    acyclovir (ZOVIRAX) 400 MG tablet 3/22/2019 at pm Self Yes Yes   Sig: Take 400 mg by mouth 2 times daily   dexamethasone (DECADRON) 4 MG tablet 3/20/2019 Self No Yes   Sig: Take 20mg (5 tablets) by mouth every week..   Patient taking differently: Take 20mg (5 tablets) by mouth every week (takes on ).   latanoprost (XALATAN) 0.005 % ophthalmic solution 3/22/2019 at Unknown time Self Yes Yes   Sig: Place 0.005 drops into the right eye daily   lidocaine-prilocaine (EMLA) cream Past Week at Unknown time Self No Yes   Sig: Apply to port site 1 hour prior to access   metoprolol succinate ER (TOPROL-XL) 100 MG 24 hr tablet 3/22/2019 at Unknown time Self No Yes   Sig: Take 1 tablet (100 mg) by mouth 2 times daily   oxyCODONE IR (ROXICODONE) 15 MG tablet prn  Self No Yes   Sig: Take 1 tablet (15 mg) by mouth every 8 hours as needed for pain maximum 4 tablet(s) per day   polyethylene glycol (MIRALAX/GLYCOLAX) powder prn Self Yes Yes   Sig: Take 1 capful by mouth daily as needed    prochlorperazine (COMPAZINE) 10 MG tablet Past Month at Unknown time Self No Yes   Sig: Take 1 tablet (10 mg) by mouth every 6 hours as needed for nausea or vomiting   vitamin D3 (VITAMIN D3) 1000 units (25 mcg) tablet 3/22/2019 at pm Self Yes Yes   Sig: Take 1,000 Units by mouth daily   warfarin (COUMADIN) 4 MG tablet 3/19/2019 at pm Self Yes Yes   Sig: Take 4 mg by mouth daily Saturday and Tuesday   warfarin (COUMADIN) 4 MG tablet  Self Yes Yes   Sig: Take 2 mg by mouth daily Sunday, Monday, Wednesday, Thursday, Friday      Facility-Administered Medications: None     Allergies   Allergies   Allergen Reactions     Blood Transfusion Related (Informational Only)      Blood antigens related to receiving Darzelex-AG     Penicillin [Penicillins] Rash     Blotches on chest        Social History   I have reviewed this patient's social history and updated it with pertinent information if needed. Amira Arreola  reports that  has never smoked. she has never used smokeless tobacco. She reports that she does not drink alcohol or use drugs.    Family History   I have reviewed this patient's family history and updated it with pertinent information if needed.   Family History   Problem Relation Age of Onset     Heart Disease Father      C.A.D. Mother      Cerebrovascular Disease Brother      Family History Negative Sister      Family History Negative Sister      Family History Negative Brother        Review of Systems   The 10 point Review of Systems is negative other than noted in the HPI or here.     Physical Exam   Temp: 98  F (36.7  C) Temp src: Oral BP: 117/61 Pulse: 128 Heart Rate: 137 Resp: 18 SpO2: 91 % O2 Device: None (Room air)    Vital Signs with Ranges  Temp:  [97.3  F (36.3  C)-98.1  F (36.7   C)] 98  F (36.7  C)  Pulse:  [] 128  Heart Rate:  [] 137  Resp:  [13-43] 18  BP: ()/() 117/61  SpO2:  [83 %-100 %] 91 %  0 lbs 0 oz    Constitutional: No apparent distress.   Eyes: No xanthelasma or conjunctivitis  HEENT: Moist oral mucosa  Respiratory: Mild crackles at the bases bilaterally.  Cardiovascular: Irregularly-irregular rhythm with a tachycardic rate. Normal S1 and S2. Soft systolic murmur heard best at the apex. No extra heart sounds. JVP elevated.  Lymph/Hematologic: No purpura or petechiae.  Skin: No stasis dermatitis. No major rashes.  Extremities: 3+ bilateral peripheral edema.  Neurologic: Moving all extremities. No facial assymmetry.  Psychiatric: Alert and oriented. Answers questions appropriately.    Data   Results for orders placed or performed during the hospital encounter of 03/22/19 (from the past 24 hour(s))   EKG 12 lead   Result Value Ref Range    Interpretation ECG Click View Image link to view waveform and result    Basic metabolic panel   Result Value Ref Range    Sodium 144 133 - 144 mmol/L    Potassium 3.9 3.4 - 5.3 mmol/L    Chloride 110 (H) 94 - 109 mmol/L    Carbon Dioxide 28 20 - 32 mmol/L    Anion Gap 6 3 - 14 mmol/L    Glucose 108 (H) 70 - 99 mg/dL    Urea Nitrogen 18 7 - 30 mg/dL    Creatinine 0.76 0.52 - 1.04 mg/dL    GFR Estimate 71 >60 mL/min/[1.73_m2]    GFR Estimate If Black 82 >60 mL/min/[1.73_m2]    Calcium 9.1 8.5 - 10.1 mg/dL   TSH with free T4 reflex   Result Value Ref Range    TSH 2.31 0.40 - 4.00 mU/L   CBC with platelets differential   Result Value Ref Range    WBC 8.1 4.0 - 11.0 10e9/L    RBC Count 3.68 (L) 3.8 - 5.2 10e12/L    Hemoglobin 11.4 (L) 11.7 - 15.7 g/dL    Hematocrit 36.1 35.0 - 47.0 %    MCV 98 78 - 100 fl    MCH 31.0 26.5 - 33.0 pg    MCHC 31.6 31.5 - 36.5 g/dL    RDW 15.0 10.0 - 15.0 %    Platelet Count 175 150 - 450 10e9/L    Diff Method Manual Differential     % Neutrophils 66.0 %    % Lymphocytes 23.0 %    % Monocytes 10.0  %    % Eosinophils 1.0 %    % Basophils 0.0 %    Absolute Neutrophil 5.3 1.6 - 8.3 10e9/L    Absolute Lymphocytes 1.9 0.8 - 5.3 10e9/L    Absolute Monocytes 0.8 0.0 - 1.3 10e9/L    Absolute Eosinophils 0.1 0.0 - 0.7 10e9/L    Absolute Basophils 0.0 0.0 - 0.2 10e9/L    Elliptocytes Slight     Platelet Estimate       Automated count confirmed.  Platelet morphology is normal.   INR   Result Value Ref Range    INR 2.16 (H) 0.86 - 1.14   Nt probnp inpatient   Result Value Ref Range    N-Terminal Pro BNP Inpatient 4,828 (H) 0 - 1,800 pg/mL   XR Chest 2 Views    Narrative    XR CHEST TWO VIEWS   3/22/2019 9:42 PM     HISTORY: Shortness of breath.      COMPARISON: 9/19/1980      Impression    IMPRESSION: Lungs are hypoinflated. Medium-sized bilateral pleural  effusions are present, worsened compared to 9/19/2018. Superimposed  bibasilar opacities are present, presumably atelectasis. Underlying  infection, aspiration, or malignancy cannot be excluded. Right chest  port catheter tip remains within the right atrium. Heart size is not  appreciably changed. Mid thoracic compression fracture is unchanged.    CAYETANO ZENG MD   Magnesium    Result Value Ref Range    Magnesium 2.0 1.6 - 2.3 mg/dL   Troponin I - Now then in 4 hours x 3    Result Value Ref Range    Troponin I ES 0.016 0.000 - 0.045 ug/L   Troponin I - Now then in 4 hours x 3    Result Value Ref Range    Troponin I ES 0.016 0.000 - 0.045 ug/L   Basic metabolic panel   Result Value Ref Range    Sodium 145 (H) 133 - 144 mmol/L    Potassium 3.5 3.4 - 5.3 mmol/L    Chloride 111 (H) 94 - 109 mmol/L    Carbon Dioxide 26 20 - 32 mmol/L    Anion Gap 8 3 - 14 mmol/L    Glucose 113 (H) 70 - 99 mg/dL    Urea Nitrogen 15 7 - 30 mg/dL    Creatinine 0.74 0.52 - 1.04 mg/dL    GFR Estimate 73 >60 mL/min/[1.73_m2]    GFR Estimate If Black 85 >60 mL/min/[1.73_m2]    Calcium 8.8 8.5 - 10.1 mg/dL   CBC with platelets differential   Result Value Ref Range    WBC 6.6 4.0 - 11.0 10e9/L     RBC Count 3.39 (L) 3.8 - 5.2 10e12/L    Hemoglobin 10.6 (L) 11.7 - 15.7 g/dL    Hematocrit 32.7 (L) 35.0 - 47.0 %    MCV 97 78 - 100 fl    MCH 31.3 26.5 - 33.0 pg    MCHC 32.4 31.5 - 36.5 g/dL    RDW 14.8 10.0 - 15.0 %    Platelet Count 138 (L) 150 - 450 10e9/L    Diff Method Automated Method     % Neutrophils 66.8 %    % Lymphocytes 11.9 %    % Monocytes 19.5 %    % Eosinophils 1.4 %    % Basophils 0.2 %    % Immature Granulocytes 0.2 %    Nucleated RBCs 0 0 /100    Absolute Neutrophil 4.4 1.6 - 8.3 10e9/L    Absolute Lymphocytes 0.8 0.8 - 5.3 10e9/L    Absolute Monocytes 1.3 0.0 - 1.3 10e9/L    Absolute Eosinophils 0.1 0.0 - 0.7 10e9/L    Absolute Basophils 0.0 0.0 - 0.2 10e9/L    Abs Immature Granulocytes 0.0 0 - 0.4 10e9/L    Absolute Nucleated RBC 0.0    INR   Result Value Ref Range    INR 2.19 (H) 0.86 - 1.14   Troponin I   Result Value Ref Range    Troponin I ES <0.015 0.000 - 0.045 ug/L   Echocardiogram Complete    Narrative    [id number]  [id number]  [id number]  [id number]^AUDI^ERA^[facility]  Echocardiography Laboratory  [address] [place] [address]        Name: ALBERTO PARSON  MRN: [id number]  : 1932  Study Date: 2019 09:52 AM  Age: 86 yrs  Gender: Female  Patient Location: [facility]  Reason For Study: CHF  Ordering Physician: ERA HUNTLEY  Referring Physician: GERSON JAMESON  Performed By: Jose Harper RDCS     BSA: 1.8 m2  Height: 65 in  Weight: 153 lb  HR: 81  BP: 104/60 mmHg  _____________________________________________________________________________  __        Procedure  Complete Portable Echo Adult.  _____________________________________________________________________________  __        Interpretation Summary     The left ventricle is normal in size.  The visual ejection fraction is estimated at 55-60%.  No regional wall motion abnormalities noted.  There is moderate (2+) mitral regurgitation.  There is moderate (2+) tricuspid  regurgitation.  Severe (>55mmHg) pulmonary hypertension is present.  The rhythm was rapid atrial fibrillation.  _____________________________________________________________________________  __        Left Ventricle  The left ventricle is normal in size. There is moderate concentric left  ventricular hypertrophy. The visual ejection fraction is estimated at 55-60%.  Diastolic function not assessed due to atrial fibrillation. No regional wall  motion abnormalities noted.     Right Ventricle  The right ventricle is normal in size and function.     Atria  The left atrium is severely dilated. The right atrium is severely dilated.  There is no color Doppler evidence of an atrial shunt.     Mitral Valve  The mitral valve leaflets are mildly thickened. There is moderate (2+) mitral  regurgitation.        Tricuspid Valve  There is moderate (2+) tricuspid regurgitation. Dilated IVC (>2.5cm) with <50%  respiratory collapse; right atrial pressure is estimated at 15-20mmHg. The  right ventricular systolic pressure is approximated at 42.5 mmHg plus the  right atrial pressure. Severe (>55mmHg) pulmonary hypertension is present.     Aortic Valve  There is trivial trileaflet aortic sclerosis. No aortic regurgitation is  present. No hemodynamically significant valvular aortic stenosis.     Pulmonic Valve  There is mild (1+) pulmonic valvular regurgitation.     Vessels  Mild aortic root dilatation. The ascending aorta is Mildly dilated.     Pericardium  There is no pericardial effusion. Pleural effusion.        Rhythm  The rhythm was rapid atrial fibrillation.  _____________________________________________________________________________  __  MMode/2D Measurements & Calculations  IVSd: 1.4 cm     LVIDd: 4.1 cm  LVIDs: 3.0 cm  LVPWd: 1.5 cm  FS: 27.2 %  LV mass(C)d: 221.8 grams  LV mass(C)dI: 125.6 grams/m2  Ao root diam: 3.9 cm  LA dimension: 4.8 cm  asc Aorta Diam: 4.0 cm  LA/Ao: 1.2  LA Volume (BP): 103.0 ml  LA Volume Index (BP):  58.2 ml/m2  RWT: 0.73           Doppler Measurements & Calculations  MV E max magdalene: 139.0 cm/sec  MV dec time: 0.18 sec  PA acc time: 0.11 sec  PI end-d magdalene: 150.8 cm/sec  TR max magdalene: 325.8 cm/sec  TR max P.5 mmHg  E/E' av.1  Lateral E/e': 16.2  Medial E/e': 24.1           _____________________________________________________________________________  __           Report approved by: Martine Gregg 2019 12:44 PM

## 2019-03-23 NOTE — PLAN OF CARE
Heart Failure Care Pathway  GOALS TO BE MET BEFORE DISCHARGE:    1. Decrease congestion and/or edema with diuretic therapy to achieve near      optimal volume status.            Dyspnea improved:  Yes            Edema improved:     Yes        Net I/O and Weights since admission:          02/21 2300 - 03/23 2259  In: 233.75 [P.O.:200; I.V.:33.75]  Out: -   Net: 233.75          There were no vitals filed for this visit.    2.  O2 sats > 92% on RA or at prior home O2 therapy level.          Current oxygenation status:       SpO2: 91 %         O2 Device: None (Room air),            Able to wean O2 this shift to keep sats > 92%:  Yes       Does patient use Home O2? No    3.  Tolerates ambulation and mobility near baseline: No, please explain:  patient states she still doesn't feel quite right with breathing normally.         How many times did the patient ambulate with nursing staff this shift? 6- ambulating plenty at room.       Please review the Heart Failure Care Pathway for additional HF goal outcomes.    Tameka Edgar RN  3/23/2019

## 2019-03-23 NOTE — PHARMACY-ANTICOAGULATION SERVICE
Clinical Pharmacy - Warfarin Dosing Consult     Pharmacy has been consulted to manage this patient s warfarin therapy.  Indication: Atrial Fibrillation  Therapy Goal: INR 2-3  Warfarin Prior to Admission: Yes  Warfarin PTA Regimen: 4 mg Sat, Tues; 2 mg ROW  Significant drug interactions: none significant     INR   Date Value Ref Range Status   03/22/2019 2.16 (H) 0.86 - 1.14 Final   03/13/2019 2.60 (H) 0.86 - 1.14 Final       Recommend warfarin zero mg today.  Pharmacy will monitor Amira Arreola daily and order warfarin doses to achieve specified goal.      Please contact pharmacy as soon as possible if the warfarin needs to be held for a procedure or if the warfarin goals change.

## 2019-03-23 NOTE — PHARMACY-ADMISSION MEDICATION HISTORY
Admission medication history interview status for the 3/22/2019  admission is complete. See EPIC admission navigator for prior to admission medications     Medication history source reliability:Good    Actions taken by pharmacist (provider contacted, etc): Chart and SureScript review, discussed with patient and patient's daughter.     Additional medication history information not noted on PTA med list :None    Medication reconciliation/reorder completed by provider prior to medication history? No    Time spent in this activity: 15 minutes    Prior to Admission medications    Medication Sig Last Dose Taking? Auth Provider   Acetaminophen (TYLENOL PO) Take 500 mg by mouth every 6 hours as needed for mild pain or fever prn Yes Reported, Patient   acyclovir (ZOVIRAX) 400 MG tablet Take 400 mg by mouth 2 times daily 3/22/2019 at pm Yes Reported, Patient   Carboxymethylcellulose Sod PF (REFRESH PLUS) 0.5 % SOLN ophthalmic solution 1 drop 4 times daily as needed for dry eyes prn Yes Unknown, Entered By History   dexamethasone (DECADRON) 4 MG tablet Take 20mg (5 tablets) by mouth every week..  Patient taking differently: Take 20mg (5 tablets) by mouth every week (takes on Wednesdays). 3/20/2019 Yes Shayne Roberts MD   latanoprost (XALATAN) 0.005 % ophthalmic solution Place 0.005 drops into the right eye daily 3/22/2019 at Unknown time Yes Reported, Patient   lidocaine-prilocaine (EMLA) cream Apply to port site 1 hour prior to access Past Week at Unknown time Yes Shayne Roberts MD   metoprolol succinate ER (TOPROL-XL) 100 MG 24 hr tablet Take 1 tablet (100 mg) by mouth 2 times daily 3/22/2019 at Unknown time Yes Addy Frias MD   Multiple Vitamin (DAILY MULTIVITAMIN PO) Take 1 tablet by mouth daily. 3/22/2019 at Unknown time Yes Reported, Patient   oxyCODONE IR (ROXICODONE) 15 MG tablet Take 1 tablet (15 mg) by mouth every 8 hours as needed for pain maximum 4 tablet(s) per day prn Yes Shayne Roberts MD    polyethylene glycol (MIRALAX/GLYCOLAX) powder Take 1 capful by mouth daily as needed  prn Yes Reported, Patient   prochlorperazine (COMPAZINE) 10 MG tablet Take 1 tablet (10 mg) by mouth every 6 hours as needed for nausea or vomiting Past Month at Unknown time Yes Shayne Roberts MD   SIMBRINZA 1-0.2 % ophthalmic suspension Place 1 drop into the right eye 2 times daily 1 drop AM and PM 3/22/2019 at am Yes Reported, Patient   Sodium Fluoride (SF 5000 PLUS) 1.1 % CREA Apply to affected area 3 times daily 3/22/2019 at Unknown time Yes Unknown, Entered By History   vitamin D3 (VITAMIN D3) 1000 units (25 mcg) tablet Take 1,000 Units by mouth daily 3/22/2019 at pm Yes Reported, Patient   warfarin (COUMADIN) 4 MG tablet Take 4 mg by mouth daily Saturday and Tuesday 3/19/2019 at pm Yes Unknown, Entered By History   warfarin (COUMADIN) 4 MG tablet Take 2 mg by mouth daily Sunday, Monday, Wednesday, Thursday, Friday  Yes Unknown, Entered By History   Zoledronic Acid (ZOMETA IV) Inject into the vein every 3 months  Past Month at Unknown time Yes Reported, Patient   Calcium Citrate-Vitamin D (CALCIUM CITRATE + PO) Take 2 tablets by mouth daily    Reported, Patient

## 2019-03-23 NOTE — H&P
[facility]    History and Physical  Hospitalist       Date of Admission:  3/22/2019  Date of Service (when I saw the patient): 03/22/19    ASSESSMENT  Amira Arreola is a markedly pleasant 86 year old woman with Multiple Myeloma metastatic to bone, chronic atrial fibrillation on Warfarin, Hypertension, multiple prior Orthopedics surgeries, and Shingles who presents with progressive dyspnea, leg swelling, and intermittent palpitations and is found to have afib with RVR and likely acute diastolic CHF exacerbation.     PLAN    1) Afib with RVR and likely acute diastolic CHF exacerbation: Cause unclear. No clear infectious causes, noting however that she is immunocompromised due to Myeloma treatment. We will rule out ACS. PE seems less likely in the setting of therapeutic INR.  The port-a-cath is mentioned as being in the right atrium, but unchanged in comparison to prior position.         She follows [facility] for afib and most recently saw Dr. Rivera 4/2018. Most recent TTE 7/2018 showed LVEF 60-65%, LVH, no regional wall motion abnormalities, increased left atrium, aortic sclerosis without stenosis, and mildly dilated aortic root (4.0 cm) and ascending aorta (4.1 cm), both slightly increased in size from a prior study. Her CHADS-2 Vasc score is listed at 4.           Overall, she could have acute CHF exacerbation due to uncontrolled afib with RVR, or the RVR could be due to progressive CHF.    -- Inpatient. Telemetry, serial enzymes, TTE ordered. Cardiology consulted. Diurese with 20 mg IV Lasix BID. Resume Toprol  mg BID in AM at Cardiology discretion. Strict I and O's, daily weights, and low sodium diet ordered.     -- Heart rates improved slightly with IV pushes of Metoprolol in the ED but remain in RVR; Diltiazem infusion ordered for use overnight.     -- Continue Warfarin    2) Multiple Myeloma: Diagnosed ion 2016 by bone marrow biopsy and treated by [facility] Dr. Roberts.  "Currently she is on Daratumab, Velcade, and Dexamethasone.     -- Consult Oncology while she is here in the setting of acute CHF exacerbation to determine when/if chemotherapy can be continued      -- Continue Zometa as an outpatient    3) Chronic anemia: Monitor while hospitalized    4) Chronic pain due to thoracic compression fracture: She takes Oxycodone 15 mg every 8 hours as needed for severe pain, to be continued PRN but with orders to hold for sedation    5) Glaucoma: Continue eye drops    6) Shingles: Continue BID Acyclovir    Chief Complaint   Dyspnea    History is obtained from the patient, her daughter at the bedside, and the ED physician whom I have spoken with    History of Present Illness   Amira Arreola is a very pleasant 86 year old woman who presents with about 10 days of slow onset, constant, gradually progressive exertional dyspnea that was initially mild but is now present and bothering her to the point that minimal exertional activity brings it on. It is associated with subjective weight gain and worsening bilateral leg swelling, as well as intermittent sensation of racing irregular heart beat over this time frame. She denies associated chest pain, cough, or fever, chills, sweats, or dysuria, diarrhea, abdominal pain, or nausea. She last saw her Oncologist on 3/13 and mentioned these symptoms and a referral was placed to see her Cardiologist and that is upcoming a few days from now. The dyspnea became worse this evening causing her to come into the ED.    In the ED, Blood pressure (!) 128/94, pulse 117, temperature 98.1  F (36.7  C), temperature source Oral, resp. rate 16, height 1.651 m (5' 5\"), SpO2 99 %, not currently breastfeeding.    Heart rates ranged from 116 to 150 in the ED.    CBC was notable for HGB 11 (10.7 on 3/13/2019). BMP showed Na 144, K 3.9, Cl 110, CO2 28, BUN 18, Cr 0.76. INR was 2.16. TSH 2.31. BNP was elevated at 4828. CXR showed bilateral medium sized pleural effusions " "with super-imposed opacifications, as well as chronic thoracic compression fracture, and port-a-cath in the right atrium. EKG showed afib with tachycardia, without ST segment elevations. She was given 40 mg IV lasix and several doses of 5 mg IV Metoprolol.    PHYSICAL EXAM  Blood pressure (!) 128/94, pulse 117, temperature 98.1  F (36.7  C), temperature source Oral, resp. rate 16, height 1.651 m (5' 5\"), SpO2 99 %, not currently breastfeeding.  Constitutional: Alert and oriented to person, place and time; no apparent distress  HEENT: normocephalic moist mucus membranes  Respiratory: lungs have bi-basilar crackles with diminishment to auscultation bilaterally  Cardiovascular: Irregular S1 S2   GI: abdomen soft non tender non distended bowel sounds positive  Skin: no rash, good turgor  Musculoskeletal: moderate bilateral LE edema  Neurologic: extra-ocular muscles intact; moves all four extremities  Psychiatric: appropriate affect, insight and judgment     DVT Prophylaxis: Warfarin  Code Status: Full Code, discussed; she says a different daughter who lives in [place] and is an Ophthalmologist has managed advance directives issues for her in the past    Disposition: Expected discharge in 2-3 days    Carter Vidal MD    Past Medical History    I have reviewed this patient's medical history and updated it with pertinent information if needed.   Past Medical History:   Diagnosis Date     Abnormal CXR 2018    then ct done and not significant     Ascending aorta dilatation (H) 04/2016    on echo, mild, fu 7/18 4.0, slightly larger     Cancer, metastatic to bone (H)     due to myeloma     Colonic polyp 2008    adenomatous, fu 2013 tics only     Compression fracture 2016    multiple areas of spine     Dry eyes      Elevated MCV 2015    b12 and folic acid nl     HTN (hypertension) 2000    off meds for years     Lung nodule 08/2018    on ct, 4mm, ct done for fu abnl cxr     Menorrhagia 2002    hysteroscopy and d and c " done     MGUS (monoclonal gammopathy of unknown significance)     eval by Dr. Roberts     Multiple myeloma (H) 2016    dx  at [facility], bone lesions seen on mri      OAB (overactive bladder)     Dr. Grullon     Osteoporosis     fu done  and stable, went off meds then, fu done ; has had gyn fu and added evista 2013 by gyn     Palpitations     nl echo, mildly dilated asc aorta     Paroxysmal atrial fibrillation (H)     had palp and ziopatch showed it, echo nl lv fxn, mild mr and tr, added coum and toprol, toprol dose raised 16     Sciatica of left side     Dr. Helen Ricardo      SVT (supraventricular tachycardia) (H)     on ziopatch     Thrombocytopenia (H)        Past Surgical History   I have reviewed this patient's surgical history and updated it with pertinent information if needed.  Past Surgical History:   Procedure Laterality Date     BONE MARROW BIOPSY, BONE SPECIMEN, NEEDLE/TROCAR N/A 2016    Procedure: BIOPSY BONE MARROW;  Surgeon: Bryan Patel MD;  Location:  GI     CATARACT IOL, RT/LT        SECTION  , 1966     COLONOSCOPY  2013    Procedure: COLONOSCOPY;  COLONOSCOPY;  Surgeon: Steffany Rockwell MD;  Location:  GI     EXCISE EXOSTOSIS TIBIA / FIBULA  2014    Procedure: EXCISE EXOSTOSIS TIBIA / FIBULA;  Surgeon: Naila Pichardo MD;  Location:  SD     hysteroscopy and d and c      due to bleeding     left anle replacement       right ankle surgery         Prior to Admission Medications   Prior to Admission Medications   Prescriptions Last Dose Informant Patient Reported? Taking?   ACYCLOVIR PO   Yes No   Sig: Take 400 mg by mouth 2 times daily   Acetaminophen (TYLENOL PO)   Yes No   Sig: Take 500 mg by mouth every 6 hours as needed for mild pain or fever   Calcium Citrate-Vitamin D (CALCIUM CITRATE + PO)   Yes No   Sig: Take 2,000 mg by mouth daily 2 tabs   Cholecalciferol  (VITAMIN D3 PO)   Yes No   Sig: Take 1,000 Units by mouth daily   Desloratadine (CLARINEX PO)   Yes No   Sig: Take by mouth daily Taking claritin   DiphenhydrAMINE HCl (BENADRYL PO)   Yes No   Sig: Take 25 mg by mouth nightly as needed   LORazepam (ATIVAN) 0.5 MG tablet   No No   Sig: Take 1 tablet (0.5 mg) by mouth every 8 hours as needed for anxiety   Multiple Vitamin (DAILY MULTIVITAMIN PO)   Yes No   Sig: Take 1 tablet by mouth daily.   Polyvinyl Alcohol-Povidone (REFRESH OP)   Yes No   SIMBRINZA 1-0.2 % ophthalmic suspension   Yes No   Sig: Place 1 drop into the right eye 2 times daily 1 drop AM and PM   UNABLE TO FIND   Yes No   Sig: MEDICATION NAME: Fresh Coat eye drops   Zoledronic Acid (ZOMETA IV)   Yes No   Sig: Inject into the vein every 30 days Every 3 month dosing   carboxymethylcellulose (REFRESH PLUS) 0.5 % SOLN   Yes No   Si drop 4 times daily   cycloSPORINE (RESTASIS) 0.05 % ophthalmic emulsion   Yes No   Sig: Place 1 drop into both eyes every 12 hours    dexamethasone (DECADRON) 4 MG tablet   No No   Sig: Take 20mg (5 tablets) by mouth every week..   dexamethasone (DECADRON) 4 MG tablet   No No   Sig: Take 20mg (5 tablets) by mouth every week..   latanoprost (XALATAN) 0.005 % ophthalmic solution   Yes No   Sig: Place 0.005 drops into the right eye daily   lidocaine-prilocaine (EMLA) cream   No No   Sig: Apply to port site 1 hour prior to access   metoprolol succinate ER (TOPROL-XL) 100 MG 24 hr tablet   No No   Sig: Take 1 tablet (100 mg) by mouth 2 times daily   oxyCODONE IR (ROXICODONE) 15 MG tablet   No No   Sig: Take 1 tablet (15 mg) by mouth every 8 hours as needed for pain maximum 4 tablet(s) per day   polyethylene glycol (MIRALAX/GLYCOLAX) powder   Yes No   Sig: Take 1 capful by mouth daily as needed    prochlorperazine (COMPAZINE) 10 MG tablet   No No   Sig: Take 1 tablet (10 mg) by mouth every 6 hours as needed for nausea or vomiting   triamcinolone (KENALOG) 0.5 % cream   No No   Sig:  Apply sparingly to affected area three times daily. 1-2 weeks , as needed   warfarin (COUMADIN) 4 MG tablet   No No   Sig: TAKE 1-2 TABLETS BY MOUTH EVERY DAY AS DIRECTED BY INR CLINIC      Facility-Administered Medications: None     Allergies   Allergies   Allergen Reactions     Blood Transfusion Related (Informational Only)      Blood antigens related to receiving Darzelex-AG     Penicillin [Penicillins] Rash     Blotches on chest        Social History   I have reviewed this patient's social history and updated it with pertinent information if needed. Amira Arreola  reports that  has never smoked. she has never used smokeless tobacco. She reports that she does not drink alcohol or use drugs.    Family History   Family history assessed and, except as above, is non-contributory.    Family History   Problem Relation Age of Onset     Heart Disease Father      C.A.D. Mother      Cerebrovascular Disease Brother      Family History Negative Sister      Family History Negative Sister      Family History Negative Brother        Review of Systems   The 10 point Review of Systems is negative other than noted in the HPI or here.     Primary Care Physician   Addy Frias    Data   Labs Ordered and Resulted from Time of ED Arrival Up to the Time of Departure from the ED   BASIC METABOLIC PANEL - Abnormal; Notable for the following components:       Result Value    Chloride 110 (*)     Glucose 108 (*)     All other components within normal limits   CBC WITH PLATELETS DIFFERENTIAL - Abnormal; Notable for the following components:    RBC Count 3.68 (*)     Hemoglobin 11.4 (*)     All other components within normal limits   INR - Abnormal; Notable for the following components:    INR 2.16 (*)     All other components within normal limits   NT PROBNP INPATIENT - Abnormal; Notable for the following components:    N-Terminal Pro BNP Inpatient 4,828 (*)     All other components within normal limits   TSH WITH FREE T4 REFLEX        Data reviewed today:  I personally reviewed the EKG tracing showing afib with tachycardia, without ST segment elevations.    Recent Results (from the past 24 hour(s))   XR Chest 2 Views    Narrative    XR CHEST TWO VIEWS   3/22/2019 9:42 PM     HISTORY: Shortness of breath.      COMPARISON: 9/19/1980      Impression    IMPRESSION: Lungs are hypoinflated. Medium-sized bilateral pleural  effusions are present, worsened compared to 9/19/2018. Superimposed  bibasilar opacities are present, presumably atelectasis. Underlying  infection, aspiration, or malignancy cannot be excluded. Right chest  port catheter tip remains within the right atrium. Heart size is not  appreciably changed. Mid thoracic compression fracture is unchanged.    CAYETANO ZENG MD

## 2019-03-23 NOTE — PROGRESS NOTES
[facility]    Medicine Progress Note - Hospitalist Service       Date of Admission:  3/22/2019  Assessment & Plan   Amira Arreola is a 86 year old female admitted on 3/22/2019.  Past history of Multiple Myeloma metastatic to bone, chronic atrial fibrillation on Warfarin, Hypertension, multiple prior Orthopedics surgeries, and Shingles who presents with progressive dyspnea, leg swelling, and intermittent palpitations and is found to have afib with RVR and likely acute diastolic CHF exacerbation.         Afib with RVR and likely acute diastolic CHF exacerbation  Follows with Dr. Rivera of [facility].  Presents with progressive SALAZAR, leg edema.  Admission EKG showing A-fib with RVR, troponin wnl.  Pro-BNP elevated at 4828 with CXR showing bilateral moderate pleural effusions with associated infiltrate vs atelectasis (afebrile, no leukocytosis to suggest PNA).  No other clear cause to trigger RVR: TSH wnl,  low suspicion for PE given therapeutic INR, no infectious symptoms, possibly due to progressive CHF.  Her CHADS-2 Vasc score is listed at 4.   - serial trop negative  - TTE 3/23 with EF 55-60% without WMA, 2+ MR, 2+TR, severe pulmonary hypertension and dilated IVC  - lasix 20 mg IV bid  - weaned from dilt gtt this afternoon, will start low dose metoprolol 12.5 mg bid  - metoprolol IV prn  - Cardiology consult pending  - continue coumadin, PharmD to dose     Multiple Myeloma  Diagnosed ion 2016 by bone marrow biopsy and treated by [facility] Dr. Roberts. Currently she is on Daratumab, Velcade, and Dexamethasone.  - oncology consult in the setting of acute CHF exacerbation to determine when/if chemotherapy can be continued  - Continue Zometa as an outpatient     Chronic anemia  - Monitor while hospitalized     Chronic pain due to thoracic compression fracture  - Continue PTA Oxycodone 15 mg q8h prn     Glaucoma  - Continue eye drops     Shingles  - Continue BID Acyclovir         Diet: Combination  Diet Low Saturated Fat Na <2400mg Diet, No Caffeine Diet    DVT Prophylaxis: Warfarin  Russo Catheter: not present  Code Status: Full Code      Disposition Plan   Expected discharge: 2 - 3 days, recommended to prior living arrangement once adequately diuresed, rates controlled.  Entered: Addy Goins MD 03/23/2019, 2:50 AM       The patient's care was discussed with the Bedside Nurse and Patient.    Addy Goins MD  Hospitalist Service  [facility]    ______________________________________________________________________    Interval History   Reports lower extremity edema and dyspnea improved since admission.  Denies any chest pain/pressure, palpitations, lightheadedness.  No other complaints.    Data reviewed today: I reviewed all medications, new labs and imaging results over the last 24 hours. I personally reviewed no images or EKG's today.    Physical Exam   Vital Signs: Temp: 98.1  F (36.7  C) Temp src: Oral BP: 107/69 Pulse: 128 Heart Rate: 87 Resp: 18 SpO2: 93 % O2 Device: None (Room air)    Weight: 0 lbs 0 oz  General Appearance: Well developed, well nourished female in NAD  Respiratory: diminished breath sounds at bases bilaterally, no wheezing or crackles  Cardiovascular: irregular rhythm, normal rate, normal s1/s2 without murmur  GI: abdomen soft, nontender, nondistended, normal bowel sounds  Lymph:  2-3+ edema to hips bilaterally  Other: Alert and appropriate, cranial nerves grossly intact     Data   Recent Labs   Lab 03/23/19  0708 03/23/19  0406 03/22/19  2353 03/22/19  2044   WBC 6.6  --   --  8.1   HGB 10.6*  --   --  11.4*   MCV 97  --   --  98   *  --   --  175   INR 2.19*  --   --  2.16*   *  --   --  144   POTASSIUM 3.5  --   --  3.9   CHLORIDE 111*  --   --  110*   CO2 26  --   --  28   BUN 15  --   --  18   CR 0.74  --   --  0.76   ANIONGAP 8  --   --  6   BRADLEY 8.8  --   --  9.1   *  --   --  108*   TROPI <0.015 0.016 0.016  --

## 2019-03-23 NOTE — PLAN OF CARE
A&O. Neuros intact. VSS. Tele afib cvr- dilt restarted and metoprolol added.  Up with sba. Denies pain.  Patient states she feels much better than yesterday.

## 2019-03-24 ENCOUNTER — APPOINTMENT (OUTPATIENT)
Dept: OCCUPATIONAL THERAPY | Facility: CLINIC | Age: 84
DRG: 308 | End: 2019-03-24
Attending: HOSPITALIST
Payer: MEDICARE

## 2019-03-24 LAB
ANION GAP SERPL CALCULATED.3IONS-SCNC: 6 MMOL/L (ref 3–14)
BUN SERPL-MCNC: 17 MG/DL (ref 7–30)
CALCIUM SERPL-MCNC: 8.9 MG/DL (ref 8.5–10.1)
CHLORIDE SERPL-SCNC: 108 MMOL/L (ref 94–109)
CO2 SERPL-SCNC: 28 MMOL/L (ref 20–32)
CREAT SERPL-MCNC: 0.75 MG/DL (ref 0.52–1.04)
ERYTHROCYTE [DISTWIDTH] IN BLOOD BY AUTOMATED COUNT: 14.7 % (ref 10–15)
GFR SERPL CREATININE-BSD FRML MDRD: 72 ML/MIN/{1.73_M2}
GLUCOSE SERPL-MCNC: 117 MG/DL (ref 70–99)
HCT VFR BLD AUTO: 31.5 % (ref 35–47)
HGB BLD-MCNC: 10.5 G/DL (ref 11.7–15.7)
INR PPP: 1.88 (ref 0.86–1.14)
MCH RBC QN AUTO: 31.3 PG (ref 26.5–33)
MCHC RBC AUTO-ENTMCNC: 33.3 G/DL (ref 31.5–36.5)
MCV RBC AUTO: 94 FL (ref 78–100)
PLATELET # BLD AUTO: 151 10E9/L (ref 150–450)
POTASSIUM SERPL-SCNC: 3.5 MMOL/L (ref 3.4–5.3)
RBC # BLD AUTO: 3.35 10E12/L (ref 3.8–5.2)
SODIUM SERPL-SCNC: 142 MMOL/L (ref 133–144)
WBC # BLD AUTO: 9.5 10E9/L (ref 4–11)

## 2019-03-24 PROCEDURE — 99232 SBSQ HOSP IP/OBS MODERATE 35: CPT | Performed by: INTERNAL MEDICINE

## 2019-03-24 PROCEDURE — A9270 NON-COVERED ITEM OR SERVICE: HCPCS | Mod: GY | Performed by: HOSPITALIST

## 2019-03-24 PROCEDURE — 25000132 ZZH RX MED GY IP 250 OP 250 PS 637: Mod: GY | Performed by: INTERNAL MEDICINE

## 2019-03-24 PROCEDURE — 85027 COMPLETE CBC AUTOMATED: CPT | Performed by: HOSPITALIST

## 2019-03-24 PROCEDURE — 36415 COLL VENOUS BLD VENIPUNCTURE: CPT | Performed by: HOSPITALIST

## 2019-03-24 PROCEDURE — 21000001 ZZH R&B HEART CARE

## 2019-03-24 PROCEDURE — 25000132 ZZH RX MED GY IP 250 OP 250 PS 637: Mod: GY | Performed by: HOSPITALIST

## 2019-03-24 PROCEDURE — 97535 SELF CARE MNGMENT TRAINING: CPT | Mod: GO

## 2019-03-24 PROCEDURE — A9270 NON-COVERED ITEM OR SERVICE: HCPCS | Mod: GY | Performed by: INTERNAL MEDICINE

## 2019-03-24 PROCEDURE — 85610 PROTHROMBIN TIME: CPT | Performed by: HOSPITALIST

## 2019-03-24 PROCEDURE — 80048 BASIC METABOLIC PNL TOTAL CA: CPT | Performed by: HOSPITALIST

## 2019-03-24 PROCEDURE — 99232 SBSQ HOSP IP/OBS MODERATE 35: CPT | Performed by: HOSPITALIST

## 2019-03-24 PROCEDURE — 97165 OT EVAL LOW COMPLEX 30 MIN: CPT | Mod: GO

## 2019-03-24 PROCEDURE — 25000128 H RX IP 250 OP 636: Performed by: HOSPITALIST

## 2019-03-24 PROCEDURE — 97110 THERAPEUTIC EXERCISES: CPT | Mod: GO

## 2019-03-24 RX ORDER — POTASSIUM CHLORIDE 29.8 MG/ML
20 INJECTION INTRAVENOUS
Status: DISCONTINUED | OUTPATIENT
Start: 2019-03-24 | End: 2019-03-28 | Stop reason: HOSPADM

## 2019-03-24 RX ORDER — POTASSIUM CHLORIDE 7.45 MG/ML
10 INJECTION INTRAVENOUS
Status: DISCONTINUED | OUTPATIENT
Start: 2019-03-24 | End: 2019-03-28 | Stop reason: HOSPADM

## 2019-03-24 RX ORDER — POTASSIUM CHLORIDE 1500 MG/1
20-40 TABLET, EXTENDED RELEASE ORAL
Status: DISCONTINUED | OUTPATIENT
Start: 2019-03-24 | End: 2019-03-28 | Stop reason: HOSPADM

## 2019-03-24 RX ORDER — WARFARIN SODIUM 4 MG/1
4 TABLET ORAL
Status: COMPLETED | OUTPATIENT
Start: 2019-03-24 | End: 2019-03-24

## 2019-03-24 RX ORDER — POTASSIUM CHLORIDE 1.5 G/1.58G
20-40 POWDER, FOR SOLUTION ORAL
Status: DISCONTINUED | OUTPATIENT
Start: 2019-03-24 | End: 2019-03-28 | Stop reason: HOSPADM

## 2019-03-24 RX ORDER — METOPROLOL SUCCINATE 100 MG/1
100 TABLET, EXTENDED RELEASE ORAL 2 TIMES DAILY
Status: DISCONTINUED | OUTPATIENT
Start: 2019-03-24 | End: 2019-03-24

## 2019-03-24 RX ORDER — POTASSIUM CL/LIDO/0.9 % NACL 10MEQ/0.1L
10 INTRAVENOUS SOLUTION, PIGGYBACK (ML) INTRAVENOUS
Status: DISCONTINUED | OUTPATIENT
Start: 2019-03-24 | End: 2019-03-28 | Stop reason: HOSPADM

## 2019-03-24 RX ADMIN — METOPROLOL SUCCINATE 100 MG: 100 TABLET, EXTENDED RELEASE ORAL at 09:29

## 2019-03-24 RX ADMIN — LATANOPROST 1 DROP: 50 SOLUTION OPHTHALMIC at 09:36

## 2019-03-24 RX ADMIN — ACYCLOVIR 400 MG: 400 TABLET ORAL at 21:01

## 2019-03-24 RX ADMIN — FUROSEMIDE 20 MG: 10 INJECTION, SOLUTION INTRAVENOUS at 16:33

## 2019-03-24 RX ADMIN — BRINZOLAMIDE/BRIMONIDINE TARTRATE 1 DROP: 10; 2 SUSPENSION/ DROPS OPHTHALMIC at 09:29

## 2019-03-24 RX ADMIN — ACYCLOVIR 400 MG: 400 TABLET ORAL at 08:22

## 2019-03-24 RX ADMIN — WARFARIN SODIUM 4 MG: 4 TABLET ORAL at 18:32

## 2019-03-24 RX ADMIN — FUROSEMIDE 20 MG: 10 INJECTION, SOLUTION INTRAVENOUS at 08:03

## 2019-03-24 RX ADMIN — BRINZOLAMIDE/BRIMONIDINE TARTRATE 1 DROP: 10; 2 SUSPENSION/ DROPS OPHTHALMIC at 21:12

## 2019-03-24 RX ADMIN — METOPROLOL SUCCINATE 125 MG: 100 TABLET, EXTENDED RELEASE ORAL at 21:01

## 2019-03-24 ASSESSMENT — ACTIVITIES OF DAILY LIVING (ADL)
ADLS_ACUITY_SCORE: 16
ADLS_ACUITY_SCORE: 18
ADLS_ACUITY_SCORE: 18
ADLS_ACUITY_SCORE: 16
ADLS_ACUITY_SCORE: 18
ADLS_ACUITY_SCORE: 16

## 2019-03-24 ASSESSMENT — MIFFLIN-ST. JEOR: SCORE: 1111.3

## 2019-03-24 NOTE — PROGRESS NOTES
[facility]    Medicine Progress Note - Hospitalist Service       Date of Admission:  3/22/2019  Assessment & Plan   Amira Arreola is a 86 year old female admitted on 3/22/2019.  Past history of Multiple Myeloma metastatic to bone, chronic atrial fibrillation on Warfarin, Hypertension, multiple prior Orthopedics surgeries, and Shingles who presents with progressive dyspnea, leg swelling, and intermittent palpitations and is found to have afib with RVR and likely acute diastolic CHF exacerbation.         Afib with RVR and likely acute diastolic CHF exacerbation  Follows with Dr. Rivera of [facility].  Presents with progressive SALAZAR, leg edema.  Admission EKG showing A-fib with RVR, serial troponin wnl.  Pro-BNP elevated at 4828 with CXR showing bilateral moderate pleural effusions with associated infiltrate vs atelectasis (afebrile, no leukocytosis to suggest PNA).  TTE 3/23 with EF 55-60% without WMA, 2+ MR, 2+TR, severe pulmonary hypertension and dilated IVC.  No other clear cause to trigger RVR: TSH wnl,  low suspicion for PE given therapeutic INR, no infectious symptoms, possibly due to progressive CHF.  Her CHADS-2 Vasc score is listed at 4.   - lasix 20 mg IV bid  - continue dilt gtt; wean as able  - continue metoprolol 100 mg bid (increased 3/24)  - metoprolol IV prn  - Cardiology recs appreciated  - continue coumadin, PharmD to dose  - daily weights (I/O inaccurate due to incontinence)     Multiple Myeloma  Diagnosed ion 2016 by bone marrow biopsy and treated by [facility] Dr. Roberts. Currently she is on Daratumab, Velcade, and Dexamethasone every 2 weeks.  - oncology consulted, recommend follow up outpatient 3/27 to determine whether appropriate for next round of chemo  - Continue Zometa as an outpatient     Chronic anemia  - hgb stable 10-11 g/dL     Chronic pain due to thoracic compression fracture  - Continue PTA Oxycodone 15 mg q8h prn     Glaucoma  - Continue eye  drops     Shingles  - Continue BID Acyclovir         Diet: Combination Diet Low Saturated Fat Na <2400mg Diet, No Caffeine Diet  Room Service    DVT Prophylaxis: Warfarin  Russo Catheter: not present  Code Status: Full Code      Disposition Plan   Expected discharge: 2 - 3 days, recommended to prior living arrangement once adequately diuresed, rates controlled.  Entered: Addy Goins MD 03/24/2019, 1:34 PM       The patient's care was discussed with the Bedside Nurse and Patient.    Addy Goins MD  Hospitalist Service  [facility]    ______________________________________________________________________    Interval History   Reports some mild wheezing and dyspnea on exertion, feels edema is improving.  No chest pain/pressure, cough or fever/chills.    Data reviewed today: I reviewed all medications, new labs and imaging results over the last 24 hours. I personally reviewed no images or EKG's today.    Physical Exam   Vital Signs: Temp: 97.6  F (36.4  C) Temp src: Oral BP: 146/82 Pulse: 92 Heart Rate: 90 Resp: 16 SpO2: 93 % O2 Device: None (Room air)    Weight: 147 lbs 12.8 oz  General Appearance: Well developed, well nourished female in NAD  Respiratory: diminished breath sounds at bases bilaterally, no wheezing or crackles, no tachypnea  Cardiovascular: irregular rhythm, normal rate, normal s1/s2 without murmur  GI: abdomen soft, nontender, normal bowel sounds  Lymph:  2+ edema to hips bilaterally  Other: Alert and appropriate, cranial nerves grossly intact     Data   Recent Labs   Lab 03/24/19  0533 03/23/19  0708 03/23/19  0406 03/22/19  2353 03/22/19  2044   WBC 9.5 6.6  --   --  8.1   HGB 10.5* 10.6*  --   --  11.4*   MCV 94 97  --   --  98    138*  --   --  175   INR 1.88* 2.19*  --   --  2.16*    145*  --   --  144   POTASSIUM 3.5 3.5  --   --  3.9   CHLORIDE 108 111*  --   --  110*   CO2 28 26  --   --  28   BUN 17 15  --   --  18   CR 0.75 0.74  --   --  0.76   ANIONGAP 6 8   --   --  6   BRADLEY 8.9 8.8  --   --  9.1   * 113*  --   --  108*   TROPI  --  <0.015 0.016 0.016  --

## 2019-03-24 NOTE — PLAN OF CARE
Pt A&O, forgetful at times. VSS on RA, tele a-fib CVR 90's. Pt denies CP, dizziness, and SOB. Dilt gtt decreased from 10 to 5mL/hr. Pt inc x2, up w/ SBA. Plan to wean off dilt, continue oral metop, continue to diurese. Will continue to monitor.

## 2019-03-24 NOTE — CONSULTS
"Care Transition Initial Assessment -      Met with: Family  (daughter Yesi)  Active Problems:    CHF exacerbation (H)       DATA  Lives With: spouse, child(ezra), adult(son)   Living Arrangements: house  Quality of Family Relationships: involved, supportive  Description of Support System: Supportive, Involved  Who is your support system?: , Children  Support Assessment: Adequate family and caregiver support.   Identified issues/concerns regarding health management:      Quality of Family Relationships: involved, supportive     Per social work consult for discharge planning.  Patient was admitted on 3-22-19 with CHF exacerbation.  The tentative date of discharge is yet to be determined.  Reviewed chart and call placed to patient's daughter Yesi to discuss discharge plans as patient was sleeping.  Per patient's daughter's report, patient lives with her  and son in a house with 6 steps to enter.  Once inside, patient does not need to use the stairs.  Patient's son is home during the day.  Patient has a \"helper\" 2 times a week who assists with bathing and laundry.  Reviewed the therapy discharge recommendations of tcu placement on discharge and patient's daughter is in agreement.  Patient's daughter states that patient's  has been to [facility] in the past and this would be the first choice.  There is not other choices at this time.  Patient's daughter did state they live in the [facility] so that would be preferred.  Patient's daughter states she lives right next door to patient, however she works a lot of hours so she is not always available to assist.  Referral sent to [facility], via discharge on the double, to check bed availability.    ASSESSMENT  Cognitive Status:  Did not meet patient, spoke with patient's daughter on the phone.  Concerns to be addressed: discharge planning, tcu placement on discharge.     PLAN  Financial costs for the patient includes N/A.  Patient given options and choices " for discharge TCU choices.  Patient/family is agreeable to the plan?  Yes  Patient Goals and Preferences: TCU on discharge.  Patient anticipates discharging to:  TCU.    Will confirm a bed, continue to follow, and assist with a safe discharge plan.    ALIE Arcos, [facility]  [phone]

## 2019-03-24 NOTE — PLAN OF CARE
Discharge Planner OT   Patient plan for discharge: Unsure.  Current status: Eval complete and treatment initiated. Patient able to complete ADLs with SB A today. Ambulates with CG A and iv pole for 4 minutes with HR 150s.   Barriers to return to prior living situation: Decreased activity tolerance, ?impaired memory.  Recommendations for discharge: TCU  Rationale for recommendations: Limited by LE swelling and SOB. Does have an adult son who could assist at home as needed. Need to see how she progresses in the next day or 2. Might be able to go home with home care.        Entered by: Senia Cavanaugh 03/24/2019 8:58 AM

## 2019-03-24 NOTE — PROGRESS NOTES
"   03/24/19 0821   Quick Adds   Type of Visit Initial Occupational Therapy Evaluation   Living Environment   Lives With child(ezra), adult;spouse  (son)   Living Arrangements house   Living Environment Comment [address], doesn't use stairs in house.    Self-Care   Activity/Exercise/Self-Care Comment Reports her \"helper\" 2x/week gives her a bath, does laundry. Son is usually home during the day.    Functional Level   Toileting (has a sink for supporrt)   Bathing 3-->assistive equipment and person  (shower chair)   Prior Functional Level Comment Her HH aide is planning to get a RTS.    General Information   Onset of Illness/Injury or Date of Surgery - Date 03/22/19   Referring Physician Marcy   Patient/Family Goals Statement None stated.   Additional Occupational Profile Info/Pertinent History of Current Problem Admitted with SALAZAR. In afib with RVR, CHF exacerbation.   Cognitive Status Examination   Orientation person;place   Level of Consciousness alert   Follows Commands (Cognition) WNL   Memory impaired   Cognitive Comment Stated Feb and \"we're going into March soon\".    Visual Perception   Visual Perception Wears glasses  (\"occasionally\")   Pain Assessment   Patient Currently in Pain No   Integumentary/Edema   Integumentary/Edema Comments BLEs edematous   Range of Motion (ROM)   ROM Quick Adds No deficits were identified   Mobility   Bed Mobility Comments SB A sit>sup   Transfer Skill: Bed to Chair/Chair to Bed   Level of [place]: Bed to Chair contact guard   Transfer Skill: Sit to Stand   Level of [place]: Sit/Stand contact guard   Balance   Balance Comments CG A without walker use.   Lower Body Dressing   Level of [place]: Dress Lower Body stand-by assist   Physical Assist/Nonphysical Assist: Dress Lower Body set-up required   Grooming   Level of [place]: Grooming stand-by assist  (sink)   Activities of Daily Living Analysis   Impairments Contributing to Impaired Activities of Daily Living " balance impaired;cognition impaired  (increased SOB)   General Therapy Interventions   Planned Therapy Interventions ADL retraining;home program guidelines;progressive activity/exercise   Clinical Impression   Criteria for Skilled Therapeutic Interventions Met yes, treatment indicated   OT Diagnosis Decreased ADL   Influenced by the following impairments impaired balance, increased SOB   Assessment of Occupational Performance 1-3 Performance Deficits   Identified Performance Deficits dressing, exercise   Clinical Decision Making (Complexity) Low complexity   Therapy Frequency daily   Predicted Duration of Therapy Intervention (days/wks) 3 days   Anticipated Discharge Disposition Transitional Care Facility   Risks and Benefits of Treatment have been explained. Yes   Patient, Family & other staff in agreement with plan of care Yes   Total Evaluation Time   Total Evaluation Time (Minutes) 15

## 2019-03-24 NOTE — PROGRESS NOTES
[facility]    Cardiology Progress Note     Assessment & Plan   Amira Arreola is a 86 year old female who was admitted on 3/22/2019. I was asked to see the patient for atrial fibrillation and heart failure.     1.  Chronic atrial fibrillation with current rapid ventricular response  2.  Chronic anticoagulation with Coumadin  3.  Acute on chronic heart failure with preserved ejection fraction  4.  Moderate mitral and tricuspid valve insufficiency  5.  Hypertension  6.  Metastatic multiple myeloma    Mrs. Arreola is doing well this morning.  She is diuresing with the IV Lasix but we are unable to have accurate ins and outs due to significant incontinence.  Her lung exam is improved from yesterday and her lower extremity edema has improved a bit as well.  We are going to wrap her legs today to help mobilize some more of this fluid.  In looking at his prior notes compared to today's weight, she likely is 5-6 kg up.  We will continue with IV diuresis today.    Diltiazem drip is been decreased down to 5 mg/h.  Given that her significant heart failure has been treated well in the past 24 hours, I am going to increase her metoprolol succinate back to outpatient dosing.  We will attempt to wean the diltiazem drip off this afternoon.  May need additional beta-blockade therapy for rate control.    Cardiology will continue to follow along.  Please page with any questions or concerns.    Jake Daniel MD    Interval History   Doing well this morning.  Feels shortness of breath and lower extremity edema is improved.  Difficulty with accurate ins and outs due to incontinence.  Denies any chest pain or chest discomfort.    Physical Exam   Temp: 97.6  F (36.4  C) Temp src: Oral BP: 146/82 Pulse: 92 Heart Rate: 90 Resp: 16 SpO2: 93 % O2 Device: None (Room air)    Vitals:    03/24/19 0653   Weight: 67 kg (147 lb 12.8 oz)     Vital Signs with Ranges  Temp:  [97.3  F (36.3  C)-98.5  F (36.9  C)] 97.6  F (36.4   C)  Pulse:  [92] 92  Heart Rate:  [] 90  Resp:  [16-20] 16  BP: (100-146)/(57-82) 146/82  SpO2:  [91 %-93 %] 93 %  I/O last 3 completed shifts:  In: 527.42 [P.O.:440; I.V.:87.42]  Out: -     Constitutional: No apparent distress.   Eyes: No xanthelasma or conjunctivitis  Respiratory: Mild crackles at the right lung base.  Cardiovascular: Irregularly-regular rhythm with a tachycardic rate.  Soft systolic murmur heard best at the apex.  Extremities: 2+ bilateral peripheral edema.  Neurologic: Moving all extremities. No facial assymmetry.  Psychiatric: Alert and oriented. Answers questions appropriately.     Medications     diltiazem (CARDIZEM) infusion ADULT 5 mg/hr (19 0151)     Reason anticoagulant not prescribed for atrial fibrillation       Warfarin Therapy Reminder         acyclovir  400 mg Oral BID     brinzolamide-brimonidine  1 drop Right Eye BID     furosemide  20 mg Intravenous BID     latanoprost  1 drop Right Eye Daily     metoprolol succinate ER  100 mg Oral BID     sodium chloride (PF)  3 mL Intracatheter Q8H     warfarin  4 mg Oral ONCE at 18:00       Data   Results for orders placed or performed during the hospital encounter of 19 (from the past 24 hour(s))   Echocardiogram Complete    Narrative    [id number]  [id number]  [id number]  [id number]^AUDI^ERA^[facility]  Echocardiography Laboratory  [address] [place] [address]        Name: ALBERTO PARSON  MRN: [id number]  : 1932  Study Date: 2019 09:52 AM  Age: 86 yrs  Gender: Female  Patient Location: [facility]  Reason For Study: CHF  Ordering Physician: ERA HUNTLEY  Referring Physician: GERSON JAMESON  Performed By: Jose Harper RDCS     BSA: 1.8 m2  Height: 65 in  Weight: 153 lb  HR: 81  BP: 104/60 mmHg  _____________________________________________________________________________  __        Procedure  Complete Portable Echo  Adult.  _____________________________________________________________________________  __        Interpretation Summary     The left ventricle is normal in size.  The visual ejection fraction is estimated at 55-60%.  No regional wall motion abnormalities noted.  There is moderate (2+) mitral regurgitation.  There is moderate (2+) tricuspid regurgitation.  Severe (>55mmHg) pulmonary hypertension is present.  The rhythm was rapid atrial fibrillation.  _____________________________________________________________________________  __        Left Ventricle  The left ventricle is normal in size. There is moderate concentric left  ventricular hypertrophy. The visual ejection fraction is estimated at 55-60%.  Diastolic function not assessed due to atrial fibrillation. No regional wall  motion abnormalities noted.     Right Ventricle  The right ventricle is normal in size and function.     Atria  The left atrium is severely dilated. The right atrium is severely dilated.  There is no color Doppler evidence of an atrial shunt.     Mitral Valve  The mitral valve leaflets are mildly thickened. There is moderate (2+) mitral  regurgitation.        Tricuspid Valve  There is moderate (2+) tricuspid regurgitation. Dilated IVC (>2.5cm) with <50%  respiratory collapse; right atrial pressure is estimated at 15-20mmHg. The  right ventricular systolic pressure is approximated at 42.5 mmHg plus the  right atrial pressure. Severe (>55mmHg) pulmonary hypertension is present.     Aortic Valve  There is trivial trileaflet aortic sclerosis. No aortic regurgitation is  present. No hemodynamically significant valvular aortic stenosis.     Pulmonic Valve  There is mild (1+) pulmonic valvular regurgitation.     Vessels  Mild aortic root dilatation. The ascending aorta is Mildly dilated.     Pericardium  There is no pericardial effusion. Pleural effusion.        Rhythm  The rhythm was rapid atrial  fibrillation.  _____________________________________________________________________________  __  MMode/2D Measurements & Calculations  IVSd: 1.4 cm     LVIDd: 4.1 cm  LVIDs: 3.0 cm  LVPWd: 1.5 cm  FS: 27.2 %  LV mass(C)d: 221.8 grams  LV mass(C)dI: 125.6 grams/m2  Ao root diam: 3.9 cm  LA dimension: 4.8 cm  asc Aorta Diam: 4.0 cm  LA/Ao: 1.2  LA Volume (BP): 103.0 ml  LA Volume Index (BP): 58.2 ml/m2  RWT: 0.73           Doppler Measurements & Calculations  MV E max magdalene: 139.0 cm/sec  MV dec time: 0.18 sec  PA acc time: 0.11 sec  PI end-d magdalene: 150.8 cm/sec  TR max magdalene: 325.8 cm/sec  TR max P.5 mmHg  E/E' av.1  Lateral E/e': 16.2  Medial E/e': 24.1           _____________________________________________________________________________  __           Report approved by: Martine Gregg 2019 12:44 PM      INR   Result Value Ref Range    INR 1.88 (H) 0.86 - 1.14   Basic metabolic panel   Result Value Ref Range    Sodium 142 133 - 144 mmol/L    Potassium 3.5 3.4 - 5.3 mmol/L    Chloride 108 94 - 109 mmol/L    Carbon Dioxide 28 20 - 32 mmol/L    Anion Gap 6 3 - 14 mmol/L    Glucose 117 (H) 70 - 99 mg/dL    Urea Nitrogen 17 7 - 30 mg/dL    Creatinine 0.75 0.52 - 1.04 mg/dL    GFR Estimate 72 >60 mL/min/[1.73_m2]    GFR Estimate If Black 84 >60 mL/min/[1.73_m2]    Calcium 8.9 8.5 - 10.1 mg/dL   CBC with platelets   Result Value Ref Range    WBC 9.5 4.0 - 11.0 10e9/L    RBC Count 3.35 (L) 3.8 - 5.2 10e12/L    Hemoglobin 10.5 (L) 11.7 - 15.7 g/dL    Hematocrit 31.5 (L) 35.0 - 47.0 %    MCV 94 78 - 100 fl    MCH 31.3 26.5 - 33.0 pg    MCHC 33.3 31.5 - 36.5 g/dL    RDW 14.7 10.0 - 15.0 %    Platelet Count 151 150 - 450 10e9/L

## 2019-03-24 NOTE — PLAN OF CARE
A&O- forgetful.  Tele afib CVR- on dilt gtt throughout the day at 5.  cardiac diet. Up with . Denies pain. Plan:  discharge to tcu

## 2019-03-24 NOTE — CONSULTS
BRIEF NUTRITION NOTE:    Received routine Nutrition Consult - Congestive Heart Failure (CHF) - Dietitian to instruct patient on 2 gram sodium diet.  Diet:  Low Saturated Fat, < 2400 mg Na.  Note that TCU is recommended at time of discharge.  Will check diet education needs prior to discharge.    Sammie Lu, MONIK, LD, CNSC

## 2019-03-24 NOTE — PLAN OF CARE
A/ox4.denies pain and sob. SOB noted on exertion.VSS on RA. Hr-mid 90's.Tele- Afib/cvr.Pt is on Cardizem gtt at 10ml/hr. Po metoprolol given. BLE +2-+3 edema. Up with SBA to BR.Plan to continue with Cardizem gtt over night and monitor VS closely. Diuresing with BID IV lasix.will continue to monitor.

## 2019-03-25 ENCOUNTER — APPOINTMENT (OUTPATIENT)
Dept: OCCUPATIONAL THERAPY | Facility: CLINIC | Age: 84
DRG: 308 | End: 2019-03-25
Payer: MEDICARE

## 2019-03-25 LAB
ANION GAP SERPL CALCULATED.3IONS-SCNC: 6 MMOL/L (ref 3–14)
BUN SERPL-MCNC: 13 MG/DL (ref 7–30)
CALCIUM SERPL-MCNC: 8.9 MG/DL (ref 8.5–10.1)
CHLORIDE SERPL-SCNC: 106 MMOL/L (ref 94–109)
CO2 SERPL-SCNC: 28 MMOL/L (ref 20–32)
CREAT SERPL-MCNC: 0.69 MG/DL (ref 0.52–1.04)
GFR SERPL CREATININE-BSD FRML MDRD: 79 ML/MIN/{1.73_M2}
GLUCOSE SERPL-MCNC: 102 MG/DL (ref 70–99)
INR PPP: 2.06 (ref 0.86–1.14)
POTASSIUM SERPL-SCNC: 3.2 MMOL/L (ref 3.4–5.3)
POTASSIUM SERPL-SCNC: 4 MMOL/L (ref 3.4–5.3)
SODIUM SERPL-SCNC: 140 MMOL/L (ref 133–144)

## 2019-03-25 PROCEDURE — 84132 ASSAY OF SERUM POTASSIUM: CPT | Performed by: HOSPITALIST

## 2019-03-25 PROCEDURE — 36415 COLL VENOUS BLD VENIPUNCTURE: CPT | Performed by: HOSPITALIST

## 2019-03-25 PROCEDURE — 25000132 ZZH RX MED GY IP 250 OP 250 PS 637: Mod: GY | Performed by: INTERNAL MEDICINE

## 2019-03-25 PROCEDURE — 99232 SBSQ HOSP IP/OBS MODERATE 35: CPT | Performed by: HOSPITALIST

## 2019-03-25 PROCEDURE — 25000132 ZZH RX MED GY IP 250 OP 250 PS 637: Mod: GY | Performed by: HOSPITALIST

## 2019-03-25 PROCEDURE — A9270 NON-COVERED ITEM OR SERVICE: HCPCS | Mod: GY | Performed by: HOSPITALIST

## 2019-03-25 PROCEDURE — 25800030 ZZH RX IP 258 OP 636: Performed by: HOSPITALIST

## 2019-03-25 PROCEDURE — A9270 NON-COVERED ITEM OR SERVICE: HCPCS | Mod: GY | Performed by: INTERNAL MEDICINE

## 2019-03-25 PROCEDURE — 97530 THERAPEUTIC ACTIVITIES: CPT | Mod: GO

## 2019-03-25 PROCEDURE — 21000001 ZZH R&B HEART CARE

## 2019-03-25 PROCEDURE — 80048 BASIC METABOLIC PNL TOTAL CA: CPT | Performed by: HOSPITALIST

## 2019-03-25 PROCEDURE — 99231 SBSQ HOSP IP/OBS SF/LOW 25: CPT | Performed by: INTERNAL MEDICINE

## 2019-03-25 PROCEDURE — 99232 SBSQ HOSP IP/OBS MODERATE 35: CPT | Performed by: INTERNAL MEDICINE

## 2019-03-25 PROCEDURE — 85610 PROTHROMBIN TIME: CPT | Performed by: HOSPITALIST

## 2019-03-25 PROCEDURE — 25000125 ZZHC RX 250: Performed by: HOSPITALIST

## 2019-03-25 PROCEDURE — 25000128 H RX IP 250 OP 636: Performed by: HOSPITALIST

## 2019-03-25 PROCEDURE — 25000128 H RX IP 250 OP 636: Performed by: INTERNAL MEDICINE

## 2019-03-25 RX ORDER — WARFARIN SODIUM 4 MG/1
4 TABLET ORAL
Status: COMPLETED | OUTPATIENT
Start: 2019-03-25 | End: 2019-03-25

## 2019-03-25 RX ORDER — FUROSEMIDE 10 MG/ML
40 INJECTION INTRAMUSCULAR; INTRAVENOUS
Status: DISCONTINUED | OUTPATIENT
Start: 2019-03-25 | End: 2019-03-26 | Stop reason: ALTCHOICE

## 2019-03-25 RX ADMIN — FUROSEMIDE 40 MG: 10 INJECTION, SOLUTION INTRAVENOUS at 16:09

## 2019-03-25 RX ADMIN — MELATONIN 3 MG: 3 TAB ORAL at 22:43

## 2019-03-25 RX ADMIN — ACYCLOVIR 400 MG: 400 TABLET ORAL at 09:55

## 2019-03-25 RX ADMIN — BRINZOLAMIDE/BRIMONIDINE TARTRATE 1 DROP: 10; 2 SUSPENSION/ DROPS OPHTHALMIC at 20:32

## 2019-03-25 RX ADMIN — POTASSIUM CHLORIDE 40 MEQ: 1500 TABLET, EXTENDED RELEASE ORAL at 09:55

## 2019-03-25 RX ADMIN — ACYCLOVIR 400 MG: 400 TABLET ORAL at 20:30

## 2019-03-25 RX ADMIN — LATANOPROST 1 DROP: 50 SOLUTION OPHTHALMIC at 10:00

## 2019-03-25 RX ADMIN — FUROSEMIDE 20 MG: 10 INJECTION, SOLUTION INTRAVENOUS at 09:56

## 2019-03-25 RX ADMIN — DILTIAZEM HYDROCHLORIDE 5 MG/HR: 5 INJECTION INTRAVENOUS at 01:53

## 2019-03-25 RX ADMIN — BRINZOLAMIDE/BRIMONIDINE TARTRATE 1 DROP: 10; 2 SUSPENSION/ DROPS OPHTHALMIC at 10:00

## 2019-03-25 RX ADMIN — METOPROLOL SUCCINATE 125 MG: 100 TABLET, EXTENDED RELEASE ORAL at 20:30

## 2019-03-25 RX ADMIN — POTASSIUM CHLORIDE 20 MEQ: 1500 TABLET, EXTENDED RELEASE ORAL at 12:20

## 2019-03-25 RX ADMIN — WARFARIN SODIUM 4 MG: 4 TABLET ORAL at 17:57

## 2019-03-25 RX ADMIN — METOPROLOL SUCCINATE 125 MG: 100 TABLET, EXTENDED RELEASE ORAL at 10:28

## 2019-03-25 ASSESSMENT — ACTIVITIES OF DAILY LIVING (ADL)
ADLS_ACUITY_SCORE: 18

## 2019-03-25 ASSESSMENT — MIFFLIN-ST. JEOR: SCORE: 1101.32

## 2019-03-25 NOTE — PROGRESS NOTES
[facility]    Medicine Progress Note - Hospitalist Service       Date of Admission:  3/22/2019  Assessment & Plan   Amira Arreola is a 86 year old female admitted on 3/22/2019.  Past history of Multiple Myeloma metastatic to bone, chronic atrial fibrillation on Warfarin, Hypertension, multiple prior Orthopedics surgeries, and Shingles who presents with progressive dyspnea, leg swelling, and intermittent palpitations and is found to have afib with RVR and likely acute diastolic CHF exacerbation.         Afib with RVR and likely acute diastolic CHF exacerbation  Follows with Dr. Rivera of [facility].  Presents with progressive SALAZAR, leg edema.  Admission EKG showing A-fib with RVR, serial troponin wnl.  Pro-BNP elevated at 4828 with CXR showing bilateral moderate pleural effusions with associated infiltrate vs atelectasis (afebrile, no leukocytosis to suggest PNA).  TTE 3/23 with EF 55-60% without WMA, 2+ MR, 2+TR, severe pulmonary hypertension and dilated IVC.  No other clear cause to trigger RVR: TSH wnl,  low suspicion for PE given therapeutic INR, no infectious symptoms, possibly due to progressive CHF.  Her CHADS-2 Vasc score is listed at 4.   - continue lasix 20 mg IV bid; weights improving (I/O's inaccurate due to incontinence)  - remains intermittently tachycardic  - continue dilt gtt; wean as able  - continue metoprolol 125 mg bid (increased 3/24, defer titration to Cards)  - continue coumadin, PharmD to dose  - lymphedema consult     Multiple Myeloma  Diagnosed ion 2016 by bone marrow biopsy and treated by [facility] Dr. Roberts. Currently she is on Daratumab, Velcade, and Dexamethasone every 2 weeks.  - oncology consulted, recommend follow up outpatient 3/27 to determine whether appropriate for next round of chemo  - Continue Zometa as an outpatient     Chronic anemia  - hgb stable 10-11 g/dL     Chronic pain due to thoracic compression fracture  - Continue PTA Oxycodone 15 mg q8h  prn     Glaucoma  - Continue eye drops     Shingles  - Continue BID Acyclovir         Diet: Combination Diet Low Saturated Fat Na <2400mg Diet, No Caffeine Diet  Room Service    DVT Prophylaxis: Warfarin  Russo Catheter: not present  Code Status: Full Code      Disposition Plan   Expected discharge: 2 - 3 days, recommended to prior living arrangement once adequately diuresed, rates controlled.  Entered: Addy Goins MD 03/25/2019, 9:09 AM       The patient's care was discussed with the Bedside Nurse and Patient.    Addy Goins MD  Hospitalist Service  [facility]    ______________________________________________________________________    Interval History   Feeling well, denies any lightheadedness, palpitations, chest pressure, dyspnea.  No other complaints.    Data reviewed today: I reviewed all medications, new labs and imaging results over the last 24 hours. I personally reviewed no images or EKG's today.    Physical Exam   Vital Signs: Temp: 98.2  F (36.8  C) Temp src: Oral BP: 114/61   Heart Rate: 117 Resp: 16 SpO2: 91 % O2 Device: None (Room air)    Weight: 145 lbs 9.6 oz     General Appearance: Well developed, well nourished female in NAD, lying in bed  Respiratory: diminished breath sounds at bases bilaterally, no wheezing or crackles, no tachypnea  Cardiovascular: irregular rhythm, normal rate, normal s1/s2 without murmur  GI: abdomen soft, nontender, normal bowel sounds  Lymph:  2+ edema to hips bilaterally  Other: Alert and appropriate, cranial nerves grossly intact     Data   Recent Labs   Lab 03/25/19  0603 03/24/19  0533 03/23/19  0708 03/23/19  0406 03/22/19  2353 03/22/19  2044   WBC  --  9.5 6.6  --   --  8.1   HGB  --  10.5* 10.6*  --   --  11.4*   MCV  --  94 97  --   --  98   PLT  --  151 138*  --   --  175   INR 2.06* 1.88* 2.19*  --   --  2.16*    142 145*  --   --  144   POTASSIUM 3.2* 3.5 3.5  --   --  3.9   CHLORIDE 106 108 111*  --   --  110*   CO2 28 28 26  --   --   28   BUN 13 17 15  --   --  18   CR 0.69 0.75 0.74  --   --  0.76   ANIONGAP 6 6 8  --   --  6   BRADLEY 8.9 8.9 8.8  --   --  9.1   * 117* 113*  --   --  108*   TROPI  --   --  <0.015 0.016 0.016  --

## 2019-03-25 NOTE — PLAN OF CARE
OT/CR: Attempted to see patient however lunch arrived at the same time. Assisted patient to chair. Bed soaked with urine. Notified RN.

## 2019-03-25 NOTE — PROGRESS NOTES
Care Coordination:    -Orders received for Congestive Heart Failure to assist with disposition and discharge planning.  CHF education to be completed by bedside nurse.  Will continue to follow and assist with discharge planning.      Deann Yan RN, BAN   Care Coordinator  Ph. [phone]

## 2019-03-25 NOTE — PLAN OF CARE
Discharge Planner OT   Patient plan for discharge: TCU.  Current status: Initially agreeable to ambulation, then upon standing c/o being tired and of back pain and returned to bed. MOD A sit>stand from chair (no arm rests for support).   Barriers to return to prior living situation: Needs A with ADL and mobility.   Recommendations for discharge: TCU.  Rationale for recommendations: Would benefit from daily therapies to A in returning to her I baseline.        Entered by: Senia Cavanaugh 03/25/2019 3:06 PM

## 2019-03-25 NOTE — PROGRESS NOTES
Care Coordination:    -Confirmed with Oncology that patient will hold chemotherapy while at [facility].    Deann Yan RN, [facility]   Care Coordinator  Ph. [phone]

## 2019-03-25 NOTE — PROGRESS NOTES
"[facility]    Cardiology Progress Note    Date of Service (when I saw the patient): 03/25/2019     Assessment & Plan   Amira Arreola is a 86 year old female who was admitted on 3/22/2019.     1-acute diastolic heart failure.  Suspect RVR for A. fib was from this although I wonder about how compliant she is with her rate control medications.  Her weight was up over 15 pounds from her recent baseline.  She is down a few pounds but has substantial more diuresis to go.  Renal function is normal and actually slightly improving.  I think she should be on increased diuretic dose.    2.-Atrial fibrillation, chronic.  RVR at presentation.  Weight now reasonably controlled.  He has been switched back to oral medications.    3-multiple myeloma on therapy.  Per hematology service stable    I will increase her furosemide to 40 mg IV twice daily.  She may need more than this.  We will assess her response to this.    Panetra Jordan M.D.    Interval History   States her legs feel more comfortable and less tense.  States her breathing is also more comfortable.  Denies palpitations currently.  Denies orthopnea.  No new complaints.  Difficult to assess I's and O's, but not sure of the accuracy.  Does not seem to be having that much urine output.  Discussed with her nurse and they have a bedside commode and are able to get her to it currently without incontinence.    Physical Exam   Vital Signs with Ranges  Temp:  [98.2  F (36.8  C)-98.6  F (37  C)] 98.2  F (36.8  C)  Pulse:  [81] 81  Heart Rate:  [] 50  Resp:  [16] 16  BP: (114-139)/(55-78) 116/60  SpO2:  [91 %-96 %] 91 %  Vitals:    03/24/19 0653 03/25/19 0530   Weight: 67 kg (147 lb 12.8 oz) 66 kg (145 lb 9.6 oz)     I/O last 3 completed shifts:  In: 480 [P.O.:480]  Out: 600 [Urine:600]    Vitals: /60 (BP Location: Right arm)   Pulse 81   Temp 98.2  F (36.8  C) (Oral)   Resp 16   Ht 1.651 m (5' 5\")   Wt 66 kg (145 lb 9.6 oz)   SpO2 91%   BMI " 24.23 kg/m      Constitutional: She is sleeping comfortably with no respiratory distress    Chest:   Decreased air movement and breath sounds.  There are a few crackles about a third of the way up on the right and minimal at the left base.    Cardiac:   Irregular rhythm controlled.  There is JVD of 8 cm or greater with HJR.  No heave.  No murmurs    Abdomen:    Nondistended, nontender    Vascular/Extremities:   There is still 2-3+ pitting edema to mid pretibial region bilaterally.  No erythema or cellulitis    Recent Labs   Lab 03/23/19  0708 03/23/19  0406 03/22/19 2353   TROPI <0.015 0.016 0.016       Recent Labs   Lab 03/25/19  0603 03/24/19  0533 03/23/19  0708 03/23/19 0406 03/22/19 2353 03/22/19  2044   WBC  --  9.5 6.6  --   --  8.1   HGB  --  10.5* 10.6*  --   --  11.4*   MCV  --  94 97  --   --  98   PLT  --  151 138*  --   --  175   INR 2.06* 1.88* 2.19*  --   --  2.16*    142 145*  --   --  144   POTASSIUM 3.2* 3.5 3.5  --   --  3.9   CHLORIDE 106 108 111*  --   --  110*   CO2 28 28 26  --   --  28   BUN 13 17 15  --   --  18   CR 0.69 0.75 0.74  --   --  0.76   GFRESTIMATED 79 72 73  --   --  71   GFRESTBLACK >90 84 85  --   --  82   ANIONGAP 6 6 8  --   --  6   BRADLEY 8.9 8.9 8.8  --   --  9.1   * 117* 113*  --   --  108*   TROPI  --   --  <0.015 0.016 0.016  --        No results found for this or any previous visit (from the past 48 hour(s)).    No results found for this or any previous visit (from the past 4320 hour(s)).    Medications     Current Facility-Administered Medications   Medication Dose Route Frequency     acyclovir  400 mg Oral BID     brinzolamide-brimonidine  1 drop Right Eye BID     furosemide  40 mg Intravenous BID     latanoprost  1 drop Right Eye Daily     metoprolol succinate ER  125 mg Oral BID     sodium chloride (PF)  3 mL Intracatheter Q8H     warfarin  4 mg Oral ONCE at 18:00         Current Facility-Administered Medications   Medication Last Rate     diltiazem  (CARDIZEM) infusion ADULT 5 mg/hr (03/25/19 1121)     Reason anticoagulant not prescribed for atrial fibrillation       Warfarin Therapy Reminder

## 2019-03-25 NOTE — PROGRESS NOTES
SW:  D:  Received a call back from [facility].  They have a bed available for Tuesday or Wednesday.  Updated patient and she is in agreement.  Call placed to update patient's daughter Yesi and she is in agreement to the plan.  P:  Will continue to follow.

## 2019-03-25 NOTE — PROGRESS NOTES
REASON FOR ASSESSMENT:  CHF Consult for 2 gm NA Diet Education    NUTRITION HISTORY:    Information obtained from:  Patient    Living situation:   Lives with son    Grocery shopping:  Son    Meal preparation:  Son    Breakfast:  Granola cereal with 2% milk    Lunch:  1/2 deli meat sandwich on whole wheat bread    Dinner:   Meat and vegetable     Previous diet instructions:  No formal diet education received. Pt has heard of a low-sodium diet in the past and has a general understanding of the concepts       CURRENT DIET:  Low saturated fat, <2400 mg Na    NUTRITION DIAGNOSIS:  Food- and nutrition-related knowledge deficit R/t no formal low-sodium diet ed previously received AEB pt report       INTERVENTIONS:    Nutrition Prescription:  Continue heart healthy diet     Implementation:    Assessed learning needs, learning preferences, and willingness to learn    Nutrition Education (Content):  a) Provided handouts:  1) Low-Sodium Nutrition Therapy   b) Discussed rational for limiting Na for CHF and stressed importance of following 2 gm Na guidelines   c) Encouraged patient to keep a daily food record    Nutrition Education (Application):  a) Discussed current eating habits and recommended alternative food choices    Anticipated good compliance    Diet Education - refer to Education Flowsheet    Goals:    Patient verbalizes understanding of diet     All of the above goals met during the education session    Follow Up:    Provided RD contact information for future questions.    Recommend Out-Patient Nutrition Referral, if further diet instructions are needed.      Cheyenne Capps, RD, LD  Clinical Dietitian

## 2019-03-25 NOTE — PROGRESS NOTES
SUBJECTIVE:  Mrs. Arreola is an 86-year-old female with kappa free light chain multiple myeloma on treatment with daratumumab, Velcade, and dexamethasone.  Myeloma has been stable.      The patient admitted to the hospital with worsening shortness of breath secondary to heart failure.  Cardiology is following.  The patient is on diuretic.  She is slowly improving.  She will be going to TCU.      The patient has chronic back pain.  No worsening of it.  No chest pain.  No abdominal pain.  No vomiting.  She feels weak.  Shortness of breath is slowly improving.      PHYSICAL EXAMINATION:   GENERAL:  She is alert, oriented x 3.   VITAL SIGNS:  Vitals reviewed.  ECOG PS of 3.   The rest of the systems not examined.      LABORATORY DATA:  Reviewed.      ASSESSMENT:   1.  An 86-year-old female with kappa free light chain multiple myeloma.   2.  Congestive heart failure.   3.  Normocytic anemia.   4.  Chronic back pain.      PLAN:   1.  The patient admitted with CHF.  She is being managed by Cardiology team.  Her symptoms are slowly improving.  She is going to TCU.   2.  For myeloma, she is on treatment every 2 weeks.  The patient will be going to TCU.  I told the patient it is okay to hold the treatment for next few weeks.  Treatment for myeloma will be resumed when she is discharged from TCU.  The patient is agreeable for this plan.   3.  She had a few questions, which were all answered.  We will see her in clinic after discharge from TCU.         ITZ LOPEZ MD             D: 2019   T: 2019   MT: WT      Name:     ALBERTO ARREOLA   MRN:      [id number]        Account:      [id number]   :      1932           Service Date: 2019      Document: [id number]

## 2019-03-25 NOTE — PLAN OF CARE
Pt is a/ox4 c intermitent confusion. VSS. On room air. Tele Afib RVR/CVR. On coumadin. Pt denies chest pain, sob.Pt has a port from prior chemo tx's. Pt on dilt gtt. SBA. Pt resting well. Continue to monitor.

## 2019-03-26 ENCOUNTER — APPOINTMENT (OUTPATIENT)
Dept: OCCUPATIONAL THERAPY | Facility: CLINIC | Age: 84
DRG: 308 | End: 2019-03-26
Payer: MEDICARE

## 2019-03-26 LAB
ANION GAP SERPL CALCULATED.3IONS-SCNC: 5 MMOL/L (ref 3–14)
BUN SERPL-MCNC: 15 MG/DL (ref 7–30)
CALCIUM SERPL-MCNC: 9.5 MG/DL (ref 8.5–10.1)
CHLORIDE SERPL-SCNC: 105 MMOL/L (ref 94–109)
CO2 SERPL-SCNC: 29 MMOL/L (ref 20–32)
CREAT SERPL-MCNC: 0.66 MG/DL (ref 0.52–1.04)
GFR SERPL CREATININE-BSD FRML MDRD: 80 ML/MIN/{1.73_M2}
GLUCOSE SERPL-MCNC: 108 MG/DL (ref 70–99)
INR PPP: 2.13 (ref 0.86–1.14)
LACTATE BLD-SCNC: 1.4 MMOL/L (ref 0.7–2)
POTASSIUM SERPL-SCNC: 3.6 MMOL/L (ref 3.4–5.3)
SODIUM SERPL-SCNC: 139 MMOL/L (ref 133–144)

## 2019-03-26 PROCEDURE — 40000893 ZZH STATISTIC PT IP EVAL DEFER: Performed by: PHYSICAL THERAPIST

## 2019-03-26 PROCEDURE — 25000132 ZZH RX MED GY IP 250 OP 250 PS 637: Mod: GY | Performed by: INTERNAL MEDICINE

## 2019-03-26 PROCEDURE — 25800030 ZZH RX IP 258 OP 636: Performed by: HOSPITALIST

## 2019-03-26 PROCEDURE — A9270 NON-COVERED ITEM OR SERVICE: HCPCS | Mod: GY | Performed by: HOSPITALIST

## 2019-03-26 PROCEDURE — 36415 COLL VENOUS BLD VENIPUNCTURE: CPT | Performed by: HOSPITALIST

## 2019-03-26 PROCEDURE — 83605 ASSAY OF LACTIC ACID: CPT | Performed by: HOSPITALIST

## 2019-03-26 PROCEDURE — A9270 NON-COVERED ITEM OR SERVICE: HCPCS | Mod: GY | Performed by: INTERNAL MEDICINE

## 2019-03-26 PROCEDURE — 25000132 ZZH RX MED GY IP 250 OP 250 PS 637: Mod: GY | Performed by: HOSPITALIST

## 2019-03-26 PROCEDURE — 97110 THERAPEUTIC EXERCISES: CPT | Mod: GO

## 2019-03-26 PROCEDURE — 80048 BASIC METABOLIC PNL TOTAL CA: CPT | Performed by: HOSPITALIST

## 2019-03-26 PROCEDURE — 99232 SBSQ HOSP IP/OBS MODERATE 35: CPT | Performed by: INTERNAL MEDICINE

## 2019-03-26 PROCEDURE — 25000125 ZZHC RX 250: Performed by: HOSPITALIST

## 2019-03-26 PROCEDURE — 21000001 ZZH R&B HEART CARE

## 2019-03-26 PROCEDURE — 99232 SBSQ HOSP IP/OBS MODERATE 35: CPT | Performed by: HOSPITALIST

## 2019-03-26 PROCEDURE — 97530 THERAPEUTIC ACTIVITIES: CPT | Mod: GO

## 2019-03-26 PROCEDURE — 85610 PROTHROMBIN TIME: CPT | Performed by: HOSPITALIST

## 2019-03-26 PROCEDURE — 25000128 H RX IP 250 OP 636: Performed by: INTERNAL MEDICINE

## 2019-03-26 RX ORDER — WARFARIN SODIUM 4 MG/1
4 TABLET ORAL
Status: COMPLETED | OUTPATIENT
Start: 2019-03-26 | End: 2019-03-26

## 2019-03-26 RX ORDER — FUROSEMIDE 40 MG
40 TABLET ORAL
Status: DISCONTINUED | OUTPATIENT
Start: 2019-03-26 | End: 2019-03-28 | Stop reason: HOSPADM

## 2019-03-26 RX ADMIN — BRINZOLAMIDE/BRIMONIDINE TARTRATE 1 DROP: 10; 2 SUSPENSION/ DROPS OPHTHALMIC at 09:27

## 2019-03-26 RX ADMIN — DILTIAZEM HYDROCHLORIDE 5 MG/HR: 5 INJECTION INTRAVENOUS at 17:53

## 2019-03-26 RX ADMIN — BRINZOLAMIDE/BRIMONIDINE TARTRATE 1 DROP: 10; 2 SUSPENSION/ DROPS OPHTHALMIC at 21:40

## 2019-03-26 RX ADMIN — METOPROLOL SUCCINATE 125 MG: 100 TABLET, EXTENDED RELEASE ORAL at 08:50

## 2019-03-26 RX ADMIN — METOPROLOL SUCCINATE 150 MG: 100 TABLET, EXTENDED RELEASE ORAL at 21:39

## 2019-03-26 RX ADMIN — FUROSEMIDE 40 MG: 40 TABLET ORAL at 16:26

## 2019-03-26 RX ADMIN — ACYCLOVIR 400 MG: 400 TABLET ORAL at 21:39

## 2019-03-26 RX ADMIN — FUROSEMIDE 40 MG: 10 INJECTION, SOLUTION INTRAVENOUS at 08:50

## 2019-03-26 RX ADMIN — WARFARIN SODIUM 4 MG: 4 TABLET ORAL at 21:39

## 2019-03-26 RX ADMIN — LATANOPROST 1 DROP: 50 SOLUTION OPHTHALMIC at 09:28

## 2019-03-26 RX ADMIN — ACETAMINOPHEN 650 MG: 325 TABLET, FILM COATED ORAL at 21:38

## 2019-03-26 RX ADMIN — ACYCLOVIR 400 MG: 400 TABLET ORAL at 08:50

## 2019-03-26 RX ADMIN — DILTIAZEM HYDROCHLORIDE 5 MG/HR: 5 INJECTION INTRAVENOUS at 03:12

## 2019-03-26 ASSESSMENT — ACTIVITIES OF DAILY LIVING (ADL)
ADLS_ACUITY_SCORE: 18
ADLS_ACUITY_SCORE: 14
ADLS_ACUITY_SCORE: 14
ADLS_ACUITY_SCORE: 18
ADLS_ACUITY_SCORE: 15
ADLS_ACUITY_SCORE: 18

## 2019-03-26 ASSESSMENT — MIFFLIN-ST. JEOR: SCORE: 1043.26

## 2019-03-26 NOTE — PLAN OF CARE
Discharge Planner OT   Patient plan for discharge: None stated today.  Current status: Patient fabiola's decreased mobility from session with me 2 days ago. Today, she needs MIN A for balance and MOD A to maneuver walker to walk ~40 feet in the hallway. Difficulty advancing LLE, c/o knee pain. Decreased walker safety throughout. .   Barriers to return to prior living situation: Needs significant A with ADL and mobility.  Recommendations for discharge: TCU.  Rationale for recommendations: Patient has regressed with regards to functional mobility since Sunday. Discussed with her that she should be in the chair to eat all of her meals and discussed this with NA.        Entered by: Senia Cavanaugh 03/26/2019 3:37 PM

## 2019-03-26 NOTE — PROGRESS NOTES
SW:  D:  Call placed to [facility] to update them as to patient's status.  They can accept patient tomorrow.    P:  Will continue to follow.

## 2019-03-26 NOTE — PROGRESS NOTES
[facility]  Patient is currently open to home care services with [facility]. The patient is currently receiving       PT/OT and HA services. [facility]  and team have been notified of patient admission. [facility] liaison will continue to follow patient during stay. If appropriate provide orders to resume home care at time of discharge.

## 2019-03-26 NOTE — PLAN OF CARE
Heart Failure Care Pathway  GOALS TO BE MET BEFORE DISCHARGE:    1. Decrease congestion and/or edema with diuretic therapy to achieve near      optimal volume status.            Dyspnea improved:  Yes            Edema improved:     No, please explain: +2-+3 BLE edema.  Lymphedema wraps on during the day.         Net I/O and Weights since admission:          02/24 0700 - 03/26 0659  In: 1347.42 [P.O.:1260; I.V.:87.42]  Out: 2700 [Urine:2700]  Net: -1352.58            Vitals:    03/24/19 0653 03/25/19 0530   Weight: 67 kg (147 lb 12.8 oz) 66 kg (145 lb 9.6 oz)       2.  O2 sats > 92% on RA or at prior home O2 therapy level.          Current oxygenation status:       SpO2: 94 %         O2 Device: None (Room air),            Able to wean O2 this shift to keep sats > 92%:  Yes       Does patient use Home O2? No    3.  Tolerates ambulation and mobility near baseline: Yes        How many times did the patient ambulate with nursing staff this shift? 0    Please review the Heart Failure Care Pathway for additional HF goal outcomes.    Carolina Dempsey RN  3/26/2019     Pt A&Ox4.  VSS on RA.  Tele SR w/1st degree AV-block and PVC.  Denies pain.  +2-+3 BLE edema, ACE wraps off during the night.  Up w/SBA and GB/walker.  Incontinent of bladder, Purewick in place w/adequate output.  PIV infusing diltiazem at 5mL/hr.  Plan to continue to diurese.  Nursing to continue to monitor.

## 2019-03-26 NOTE — PLAN OF CARE
VSS on RA. Tele- AFib CVR. A&Ox4, intermittently confused. Dilt gtt via port. Incontinent, purewick in place with good outputs. On scheduled IV lasix. BLE edema +3, ace wraps in place. Lympedema wraps to be placed tomorrow. Up SBA. Continue to monitor.

## 2019-03-26 NOTE — PROGRESS NOTES
[facility]    Cardiology Progress Note    Date of Service (when I saw the patient): 03/26/2019     Assessment & Plan   Amira Arreola is a 86 year old female who was admitted on 3/22/2019.     1-acute diastolic heart failure.  Improved.  With increased IV Lasix she had very good diuresis last night.  Think she is approaching her previous control of weight which is around 130-133 pounds.  She was above 147 at admission.  I would switch her to oral diuretics at this time.   Suspect RVR for RYLAN. mitchel was from her decompensated heart failure rather than the cause of it, although I wonder about how compliant she is with her rate control medications.  Her weight was up over 15 pounds from her recent baseline.      2.-Atrial fibrillation, chronic.  RVR at presentation.    Rate better.  On increased dose of beta-blocker.  Should be taken off of her IV diltiazem.  He needs additional rate control may need to increase beta-blocker or add diltiazem orally.     3-multiple myeloma on therapy.  Per hematology service stable     Recommend changing her furosemide to 40 mg oral twice daily, likely this can then eventually be decreased to 40 mg daily on a chronic basis.  Heart failure teaching done but she will need much more work on this.  She has a daughter who helps her with her medications at home, and his son who does the cooking.  They should be educated also.  TCU placement anticipated at discharge..       Pantera Jordan M.D.    Interval History   Feels much better.  States that she urinated a lot last night.  Thinks her legs feel back to normal.  Is not having any shortness of breath today.  Affect is more brighter and she has more energy.  No new complaints.  With this significant diuresis net of close to 2 L yesterday renal function is stable to borderline improved.    Physical Exam   Vital Signs with Ranges  Temp:  [98.3  F (36.8  C)-98.5  F (36.9  C)] 98.3  F (36.8  C)  Pulse:  [72-74] 72  Heart Rate:   "[52-75] 64  Resp:  [16] 16  BP: (119-132)/(68-80) 119/68  SpO2:  [93 %-94 %] 93 %  Vitals:    03/24/19 0653 03/25/19 0530 03/26/19 0630   Weight: 67 kg (147 lb 12.8 oz) 66 kg (145 lb 9.6 oz) 60.2 kg (132 lb 12.8 oz)     I/O last 3 completed shifts:  In: 340 [P.O.:340]  Out: 2450 [Urine:2450]    Vitals: /68 (BP Location: Right arm)   Pulse 72   Temp 98.3  F (36.8  C) (Oral)   Resp 16   Ht 1.651 m (5' 5\")   Wt 60.2 kg (132 lb 12.8 oz)   SpO2 93%   BMI 22.10 kg/m      Constitutional: Resting comfortably without distress or increased respiratory effort    Chest:   Diffusely decreased breath sounds.  There are a few basilar crackles still but they are improved    Cardiac: Irregularly irregular rhythm with distant heart sounds.  JVD is still elevated at above 7 cm with HJR    Abdomen:   Nontender nondistended    Vascular/Extremities: Wraps are in place.  Underneath the wraps there is still 1+ edema left and trace right for the most part but this is significantly improved.  He is a bit worse at the lower pretibial and ankle region    Recent Labs   Lab 03/23/19  0708 03/23/19  0406 03/22/19  2353   TROPI <0.015 0.016 0.016       Recent Labs   Lab 03/26/19  0538 03/25/19  1510 03/25/19  0603 03/24/19  0533 03/23/19  0708 03/23/19  0406 03/22/19  2353 03/22/19  2044   WBC  --   --   --  9.5 6.6  --   --  8.1   HGB  --   --   --  10.5* 10.6*  --   --  11.4*   MCV  --   --   --  94 97  --   --  98   PLT  --   --   --  151 138*  --   --  175   INR 2.13*  --  2.06* 1.88* 2.19*  --   --  2.16*     --  140 142 145*  --   --  144   POTASSIUM 3.6 4.0 3.2* 3.5 3.5  --   --  3.9   CHLORIDE 105  --  106 108 111*  --   --  110*   CO2 29  --  28 28 26  --   --  28   BUN 15  --  13 17 15  --   --  18   CR 0.66  --  0.69 0.75 0.74  --   --  0.76   GFRESTIMATED 80  --  79 72 73  --   --  71   GFRESTBLACK >90  --  >90 84 85  --   --  82   ANIONGAP 5  --  6 6 8  --   --  6   BRADLEY 9.5  --  8.9 8.9 8.8  --   --  9.1   *  " --  102* 117* 113*  --   --  108*   TROPI  --   --   --   --  <0.015 0.016 0.016  --        No results found for this or any previous visit (from the past 48 hour(s)).    No results found for this or any previous visit (from the past 4320 hour(s)).    Medications     Current Facility-Administered Medications   Medication Dose Route Frequency     acyclovir  400 mg Oral BID     brinzolamide-brimonidine  1 drop Right Eye BID     furosemide  40 mg Intravenous BID     latanoprost  1 drop Right Eye Daily     metoprolol succinate ER  125 mg Oral BID     sodium chloride (PF)  3 mL Intracatheter Q8H     warfarin  4 mg Oral ONCE at 18:00         Current Facility-Administered Medications   Medication Last Rate     diltiazem (CARDIZEM) infusion ADULT Stopped (03/26/19 0822)     Reason anticoagulant not prescribed for atrial fibrillation       Warfarin Therapy Reminder

## 2019-03-26 NOTE — PROGRESS NOTES
[facility]    Medicine Progress Note - Hospitalist Service       Date of Admission:  3/22/2019  Assessment & Plan   Amira Arreola is a 86 year old female admitted on 3/22/2019.  Past history of Multiple Myeloma metastatic to bone, chronic atrial fibrillation on Warfarin, Hypertension, multiple prior Orthopedics surgeries, and Shingles who presents with progressive dyspnea, leg swelling, and intermittent palpitations and is found to have afib with RVR and likely acute diastolic CHF exacerbation.         Afib with RVR and likely acute diastolic CHF exacerbation  Follows with Dr. Rivera of [facility].  Presents with progressive SALAZAR, leg edema.  Admission EKG showing A-fib with RVR, serial troponin wnl.  Pro-BNP elevated at 4828 with CXR showing bilateral moderate pleural effusions with associated infiltrate vs atelectasis (afebrile, no leukocytosis to suggest PNA).  TTE 3/23 with EF 55-60% without WMA, 2+ MR, 2+TR, severe pulmonary hypertension and dilated IVC.  No other clear cause to trigger RVR: TSH wnl,  low suspicion for PE given therapeutic INR, no infectious symptoms, possibly due to progressive CHF.  Her CHADS-2 Vasc score is listed at 4.   - continue lasix 40 mg IV bid; weights improving (I/O's inaccurate due to incontinence) and marked improvement in LE edema 3/26  - rates currently controlled; will try to wean dilt gtt  - continue metoprolol 125 mg bid (increased 3/24, defer titration to Cards)  - continue coumadin, PharmD to dose  - lymphedema wraps during the day  ADDENDUM:  Paged that heart rate up again following discontinuation of dilt gtt.  Will increase metoprolol to 150 mg bid.     Multiple Myeloma  Diagnosed ion 2016 by bone marrow biopsy and treated by [facility] Dr. Roberts. Currently she is on Daratumab, Velcade, and Dexamethasone every 2 weeks.  - oncology consulted, chemo will be held until discharged from [facility]  - Continue Zometa as an outpatient     Chronic anemia  - hgb  stable 10-11 g/dL     Chronic pain due to thoracic compression fracture  - Continue PTA Oxycodone 15 mg q8h prn     Glaucoma  - Continue eye drops     Shingles  - Continue BID Acyclovir         Diet: Combination Diet Low Saturated Fat Na <2400mg Diet, No Caffeine Diet  Room Service    DVT Prophylaxis: Warfarin  Russo Catheter: not present  Code Status: Full Code      Disposition Plan   Expected discharge: Tomorrow, recommended to prior living arrangement once adequately diuresed, rates controlled, stable on oral meds.  Entered: Addy Goins MD 03/26/2019, 8:15 AM       The patient's care was discussed with the Bedside Nurse and Patient.    Addy Goins MD  Hospitalist Service  [facility]    ______________________________________________________________________    Interval History   Poor sleep overnight but denies any chest pain/pressure, dyspnea, fever/chills.  Lower extremity edema much improved.  No other complaints.    Data reviewed today: I reviewed all medications, new labs and imaging results over the last 24 hours. I personally reviewed no images or EKG's today.    Physical Exam   Vital Signs: Temp: 98.3  F (36.8  C) Temp src: Oral BP: 128/78 Pulse: 73 Heart Rate: 71 Resp: 16 SpO2: 94 % O2 Device: None (Room air)    Weight: 132 lbs 12.8 oz     General Appearance: Well developed, well nourished female in NAD  Respiratory: lungs CTAB, no wheezing or crackles, no tachypnea  Cardiovascular: irregular rhythm, normal rate, normal s1/s2 without murmur  GI: abdomen soft, nontender, normal bowel sounds  Lymph:  Trace-1+ edema of LE  Other: Alert and appropriate, cranial nerves grossly intact     Data   Recent Labs   Lab 03/26/19  0538 03/25/19  1510 03/25/19  0603 03/24/19  0533 03/23/19  0708 03/23/19  0406 03/22/19  2353 03/22/19  2044   WBC  --   --   --  9.5 6.6  --   --  8.1   HGB  --   --   --  10.5* 10.6*  --   --  11.4*   MCV  --   --   --  94 97  --   --  98   PLT  --   --   --  151 138*   --   --  175   INR 2.13*  --  2.06* 1.88* 2.19*  --   --  2.16*     --  140 142 145*  --   --  144   POTASSIUM 3.6 4.0 3.2* 3.5 3.5  --   --  3.9   CHLORIDE 105  --  106 108 111*  --   --  110*   CO2 29  --  28 28 26  --   --  28   BUN 15  --  13 17 15  --   --  18   CR 0.66  --  0.69 0.75 0.74  --   --  0.76   ANIONGAP 5  --  6 6 8  --   --  6   BRADLEY 9.5  --  8.9 8.9 8.8  --   --  9.1   *  --  102* 117* 113*  --   --  108*   TROPI  --   --   --   --  <0.015 0.016 0.016  --

## 2019-03-26 NOTE — PROVIDER NOTIFICATION
Dr Goins called back regarding pt's Afib RVR. He will increase metoprolol and would like pt to start on Dilt gtt @ 5mg. Will continue to monitor.

## 2019-03-26 NOTE — PROGRESS NOTES
Care Coordination:    -Left message to cancel the below appt as the pt is still inpatient.     Mar 27, 2019 10:00 AM CDT  Level 2 with  INFUSION CHAIR 20  [facility] ([facility]) [facility]  [address] [facility] [address]  [facility] [address]  [phone]         Deann Yan RN, [facility]   Care Coordinator  Ph. [phone]

## 2019-03-27 ENCOUNTER — APPOINTMENT (OUTPATIENT)
Dept: OCCUPATIONAL THERAPY | Facility: CLINIC | Age: 84
DRG: 308 | End: 2019-03-27
Payer: MEDICARE

## 2019-03-27 ENCOUNTER — HOSPITAL ENCOUNTER (OUTPATIENT)
Facility: CLINIC | Age: 84
Setting detail: SPECIMEN
End: 2019-03-27
Attending: INTERNAL MEDICINE
Payer: MEDICARE

## 2019-03-27 LAB — INR PPP: 2.55 (ref 0.86–1.14)

## 2019-03-27 PROCEDURE — A9270 NON-COVERED ITEM OR SERVICE: HCPCS | Mod: GY | Performed by: HOSPITALIST

## 2019-03-27 PROCEDURE — A9270 NON-COVERED ITEM OR SERVICE: HCPCS | Mod: GY | Performed by: INTERNAL MEDICINE

## 2019-03-27 PROCEDURE — 25000132 ZZH RX MED GY IP 250 OP 250 PS 637: Mod: GY | Performed by: INTERNAL MEDICINE

## 2019-03-27 PROCEDURE — 97535 SELF CARE MNGMENT TRAINING: CPT | Mod: GO

## 2019-03-27 PROCEDURE — 97110 THERAPEUTIC EXERCISES: CPT | Mod: GO

## 2019-03-27 PROCEDURE — 93010 ELECTROCARDIOGRAM REPORT: CPT | Performed by: INTERNAL MEDICINE

## 2019-03-27 PROCEDURE — 25000132 ZZH RX MED GY IP 250 OP 250 PS 637: Mod: GY | Performed by: HOSPITALIST

## 2019-03-27 PROCEDURE — 99232 SBSQ HOSP IP/OBS MODERATE 35: CPT | Performed by: INTERNAL MEDICINE

## 2019-03-27 PROCEDURE — 93005 ELECTROCARDIOGRAM TRACING: CPT

## 2019-03-27 PROCEDURE — 97530 THERAPEUTIC ACTIVITIES: CPT | Mod: GO

## 2019-03-27 PROCEDURE — 21000001 ZZH R&B HEART CARE

## 2019-03-27 PROCEDURE — 85610 PROTHROMBIN TIME: CPT | Performed by: HOSPITALIST

## 2019-03-27 PROCEDURE — 36415 COLL VENOUS BLD VENIPUNCTURE: CPT | Performed by: HOSPITALIST

## 2019-03-27 RX ORDER — WARFARIN SODIUM 2 MG/1
2 TABLET ORAL
Status: COMPLETED | OUTPATIENT
Start: 2019-03-27 | End: 2019-03-27

## 2019-03-27 RX ORDER — DIGOXIN 125 MCG
500 TABLET ORAL ONCE
Status: COMPLETED | OUTPATIENT
Start: 2019-03-27 | End: 2019-03-27

## 2019-03-27 RX ADMIN — ACYCLOVIR 400 MG: 400 TABLET ORAL at 21:07

## 2019-03-27 RX ADMIN — ACETAMINOPHEN 650 MG: 325 TABLET, FILM COATED ORAL at 02:36

## 2019-03-27 RX ADMIN — ACYCLOVIR 400 MG: 400 TABLET ORAL at 08:18

## 2019-03-27 RX ADMIN — LATANOPROST 1 DROP: 50 SOLUTION OPHTHALMIC at 08:21

## 2019-03-27 RX ADMIN — WARFARIN SODIUM 2 MG: 2 TABLET ORAL at 17:28

## 2019-03-27 RX ADMIN — FUROSEMIDE 40 MG: 40 TABLET ORAL at 08:18

## 2019-03-27 RX ADMIN — METOPROLOL SUCCINATE 150 MG: 100 TABLET, EXTENDED RELEASE ORAL at 08:18

## 2019-03-27 RX ADMIN — FUROSEMIDE 40 MG: 40 TABLET ORAL at 16:43

## 2019-03-27 RX ADMIN — BRINZOLAMIDE/BRIMONIDINE TARTRATE 1 DROP: 10; 2 SUSPENSION/ DROPS OPHTHALMIC at 08:19

## 2019-03-27 RX ADMIN — METOPROLOL SUCCINATE 150 MG: 100 TABLET, EXTENDED RELEASE ORAL at 17:27

## 2019-03-27 RX ADMIN — BRINZOLAMIDE/BRIMONIDINE TARTRATE 1 DROP: 10; 2 SUSPENSION/ DROPS OPHTHALMIC at 21:07

## 2019-03-27 RX ADMIN — DIGOXIN 500 MCG: 0.12 TABLET ORAL at 23:28

## 2019-03-27 ASSESSMENT — ACTIVITIES OF DAILY LIVING (ADL)
ADLS_ACUITY_SCORE: 20
ADLS_ACUITY_SCORE: 18
ADLS_ACUITY_SCORE: 18
ADLS_ACUITY_SCORE: 15
ADLS_ACUITY_SCORE: 20
ADLS_ACUITY_SCORE: 20

## 2019-03-27 ASSESSMENT — MIFFLIN-ST. JEOR: SCORE: 1054.6

## 2019-03-27 NOTE — PLAN OF CARE
Pt is A/O x4. Assist of 1 walker and GB. VSS. IV SL. Tele A fib CVR. Pt has stayed in a fib CVR most of shift. HR increased a bit during PT, then came back down. Lymph wraps on. Low fat. Low Na, no caffeine diet. Potential discharge tomorrow to [place] if HR stays stable on PO metoprolol.

## 2019-03-27 NOTE — PLAN OF CARE
"Pt A/O. VSS. Up with ax1. Tele shows aflutter with RVR. HR in 140s. Cardiology notified, gave metoprolol early. Will monitor overnight. Next step per cards is CV if symptomatic. pt denies any CP or SOB. Possible discharge to TCU tomorrow pending HR control.     /86   Pulse 143   Temp 98.1  F (36.7  C) (Oral)   Resp 16   Ht 1.651 m (5' 5\")   Wt 61.4 kg (135 lb 4.8 oz)   SpO2 95%   BMI 22.52 kg/m        Heart Failure Care Pathway  GOALS TO BE MET BEFORE DISCHARGE:    1. Decrease congestion and/or edema with diuretic therapy to achieve near      optimal volume status.            Dyspnea improved:  Yes            Edema improved:     No, please explain: +2 pitting with wraps on.         Net I/O and Weights since admission:          02/25 2300 - 03/27 2259  In: 2288.42 [P.O.:2195; I.V.:93.42]  Out: 3050 [Urine:3050]  Net: -761.58            Vitals:    03/24/19 0653 03/25/19 0530 03/26/19 0630 03/27/19 0545   Weight: 67 kg (147 lb 12.8 oz) 66 kg (145 lb 9.6 oz) 60.2 kg (132 lb 12.8 oz) 61.4 kg (135 lb 4.8 oz)       2.  O2 sats > 92% on RA or at prior home O2 therapy level.          Current oxygenation status:       SpO2: 95 %         O2 Device: None (Room air),  Oxygen Delivery: 2 LPM         Able to wean O2 this shift to keep sats > 92%:  Yes       Does patient use Home O2? No    3.  Tolerates ambulation and mobility near baseline: No, please explain: did not assess.         How many times did the patient ambulate with nursing staff this shift? 0    Please review the Heart Failure Care Pathway for additional HF goal outcomes.    Mihir Reyes RN  3/27/2019         "

## 2019-03-27 NOTE — PLAN OF CARE
Shift Update: Pt A&O to self and place . After working with PT/OT  started on dilt drip at 1800 A-fib RVR rates high 140s. on RA.   Denies pain, very tired .+1/  +2 BLE edema, Lymphedema wraps applied this AM.  Up w/SBA and GB/walker.  Incontinent of bladder, Purewick in place pt moves often x3 unable to get accurate output.Diltiazem at 5mL/hr started at 1800. Pt changed over to PO Lasix

## 2019-03-27 NOTE — PLAN OF CARE
Pt alert, oriented and able to initiate needs.  Pt is forgetful overnight and was quite lethargic towards the beginning of this nurse's shift at 1900.  Pt easy to awaken and improved overnight.  Attempted to monitor output with purewick but pt moved around to frequently and it was unable to collect.  Pt incontinent of large amounts.  Pt c/o left knee pain which is relieved with prn tylenol.  Pt otherwise denies discomfort.  Pt did require 1-2 L/NC overnight when sleeping for SPO2 of 88-90%.  Pt continued to remove O2 and has been spot checked 92-93% on room air.  Pt denies any shortness of breath.  Main concern with pt was need for frequent BP checks.  Titrated diltiazem drip as able.  Did need to stop drip at 2215 for SBP in 70's-80's.  Pt had also had nighttime dose of metoprolol.  Pt asymptomatic throughout and recovered quickly.  On call hospitalist was notified with no new orders.  See vitals flowsheet for frequent monitoring.  Pt did need to restart dilt drip as HR increased to 120's-140's.  Currently pt has been weaned to 5mg/hr and HR is 80's-110's.  Pt rhythm still afib.  Continuing to monitor.

## 2019-03-27 NOTE — PROGRESS NOTES
SW:  D:  Call placed to update [facility] as to patient's status and potential for discharge tomorrow.  They anticipate a bed available for patient tomorrow.  P:  Will continue to follow.

## 2019-03-27 NOTE — PLAN OF CARE
Discharge Planner OT   Patient plan for discharge: None stated.   Current status: Continues to run into objects on L side when ambulating, but improved mobility today. Vitals stable,  with ambulation.   Barriers to return to prior living situation: Needs A with ADL and mobility.  Recommendations for discharge: TCU.  Rationale for recommendations: Not at baseline. Performance has fluctuated in the last few days. Would benefit from daily therapy.        Entered by: Senia Cavanaugh 03/27/2019 11:00 AM

## 2019-03-27 NOTE — CONSULTS
[facility] evaluation referral received from [facility].     Patient is not currently established in the [facility]. Patient admitted on 3/22 w/increased SOB, LE swelling and weight gain. patient w/known AF, admit ECG w/AFRVR. Patient treated w/IV diltiazem and metoprolol. On 3/27 IV diltiazem restarted for rate control. Cardiology consult on 3/22, patient estimated to have 10-20# excess fluid she has been diuresed w/IV lasix. Dry weight estimated at 130-133#. Patient also w/multiple myeloma currently on chemo. Patient to DC to TCU.     [facility] appointment made for:  Future Appointments   Date Time Provider Department Center   4/1/2019  1:00 PM MA LAB [facility]   4/1/2019  2:00 PM Pantera Jordan MD [facility] CLIN   4/11/2019 10:00 AM  INFUSION CHAIR 17 [facility] SONU ARTHUR   4/11/2019 10:40 AM Shayne Roberts MD [facility]     We look forward to seeing her in the clinic.   Please call with questions.     Bernarda Lira, RN  [facility] RN Care Coordinator  [facility]  [phone]    [facility]: Cardiomyopathy, Optimization, Rehabilitation, Education   The [facility] is a heart failure specialty clinic within the [facility] where you will work with your cardiologist, nurse practitioners, physician assistants and registered nurses who specialize in heart failure care. They are dedicated to helping patients with heart failure to carefully adjust medications, receive education, and learn who and when to call if symptoms develop. They specialize in helping you better understand your condition, slow the progression of your disease, improve the length and quality of your life, help you detect future heart problems before they become life threatening, and avoid hospitalizations.

## 2019-03-27 NOTE — PROGRESS NOTES
SPIRITUAL HEALTH SERVICES Progress Note  [facility]    Initial visit per LOS.  Pt pleasantly declined offer of a SH visit today.  Pt's dtr was visiting and demonstrated caring support of pt.  SH offered future availability per pt interest/request.                                                                                                                                            Pantera Gerard M.Div., [facility]  Staff   Pager [phone]

## 2019-03-27 NOTE — PROGRESS NOTES
Notified Dr. Quevedo, on call hospitalist, regarding low SBP after receiving PO metoprolol while on diltiazem drip.  Pt recovered quickly and was asymptomatic.  No new orders received. Continue to monitor frequently.

## 2019-03-27 NOTE — PROGRESS NOTES
[facility]    Cardiology Progress Note    Date of Service (when I saw the patient): 03/27/2019     Assessment & Plan   Amira Arreola is a 86 year old female who was admitted on 3/22/2019.     1-acute diastolic heart failure.  Improved.    Has continued to diurese on oral diuretics started yesterday.  Edema much better.  Legs feel good.        2.-Atrial fibrillation, chronic.  RVR at presentation.    Rate better, but sometimes goes up a bit..  On increased dose of beta-blocker.    Off of IV diltiazem.   Will titrate beta-blockers a bit more.     3-multiple myeloma on therapy.  Per hematology service stable     Recommendation-  Continue current oral diuretic.  Anticipate she should build to go to TCU tomorrow.  I will titrate her beta-blocker for rate control  I talked with her daughter about this and kept her up-to-date on plans and her course.  She should follow-up in core clinic in about a week after discharge.                Pantera Jordan M.D.    Interval History   Currently without heart failure symptoms, off oxygen and ambulating a bit.  No orthopnea PND.no new symptoms.    Suspect RVR for KAROL grullon was from her decompensated heart failure rather than the cause of it, although I wonder about how compliant she is with her rate control medications.  Her weight was up over 15 pounds from her recent baseline.  No approaching her previous baseline weight which is around 133.  Renal function continues to be stable to slightly better with continued diuresis.  Lungs are essentially clear, there is no JVD or HJR.  Peripheral edema markedly improved    Physical Exam   Vital Signs with Ranges  Temp:  [98  F (36.7  C)-100.3  F (37.9  C)] 98.1  F (36.7  C)  Pulse:  [] 119  Heart Rate:  [] 94  Resp:  [16-18] 16  BP: ()/(46-89) 115/73  SpO2:  [91 %-98 %] 95 %  Vitals:    03/25/19 0530 03/26/19 0630 03/27/19 0545   Weight: 66 kg (145 lb 9.6 oz) 60.2 kg (132 lb 12.8 oz) 61.4 kg (135 lb 4.8 oz)  "    I/O last 3 completed shifts:  In: 941 [P.O.:935; I.V.:6]  Out: -     Vitals: /73   Pulse 119   Temp 98.1  F (36.7  C) (Oral)   Resp 16   Ht 1.651 m (5' 5\")   Wt 61.4 kg (135 lb 4.8 oz)   SpO2 95%   BMI 22.52 kg/m      Constitutional: Resting comfortably with no distress    Chest:   Lungs now essentially clear    Cardiac: No JVD or HJR    Abdomen: Soft, nontender    Vascular/Extremities: There are wraps on but clearly is markedly reduced edema below her wraps.    Recent Labs   Lab 03/23/19  0708 03/23/19  0406 03/22/19 2353   TROPI <0.015 0.016 0.016       Recent Labs   Lab 03/27/19  0623 03/26/19  0538 03/25/19  1510 03/25/19  0603 03/24/19  0533 03/23/19  0708 03/23/19  0406 03/22/19  2353 03/22/19  2044   WBC  --   --   --   --  9.5 6.6  --   --  8.1   HGB  --   --   --   --  10.5* 10.6*  --   --  11.4*   MCV  --   --   --   --  94 97  --   --  98   PLT  --   --   --   --  151 138*  --   --  175   INR 2.55* 2.13*  --  2.06* 1.88* 2.19*  --   --  2.16*   NA  --  139  --  140 142 145*  --   --  144   POTASSIUM  --  3.6 4.0 3.2* 3.5 3.5  --   --  3.9   CHLORIDE  --  105  --  106 108 111*  --   --  110*   CO2  --  29  --  28 28 26  --   --  28   BUN  --  15  --  13 17 15  --   --  18   CR  --  0.66  --  0.69 0.75 0.74  --   --  0.76   GFRESTIMATED  --  80  --  79 72 73  --   --  71   GFRESTBLACK  --  >90  --  >90 84 85  --   --  82   ANIONGAP  --  5  --  6 6 8  --   --  6   BRADLEY  --  9.5  --  8.9 8.9 8.8  --   --  9.1   GLC  --  108*  --  102* 117* 113*  --   --  108*   TROPI  --   --   --   --   --  <0.015 0.016 0.016  --        No results found for this or any previous visit (from the past 48 hour(s)).    No results found for this or any previous visit (from the past 4320 hour(s)).    Medications     Current Facility-Administered Medications   Medication Dose Route Frequency     acyclovir  400 mg Oral BID     brinzolamide-brimonidine  1 drop Right Eye BID     furosemide  40 mg Oral BID     " latanoprost  1 drop Right Eye Daily     metoprolol succinate ER  150 mg Oral BID     sodium chloride (PF)  3 mL Intracatheter Q8H     warfarin  2 mg Oral ONCE at 18:00         Current Facility-Administered Medications   Medication Last Rate     Reason anticoagulant not prescribed for atrial fibrillation       Warfarin Therapy Reminder

## 2019-03-27 NOTE — PROVIDER NOTIFICATION
MD Notification    Notified Person: MD    Notified Person Name: Dr. Jordan    Notification Date/Time: 3/27/19 8375    Notification Interaction: verbal    Purpose of Notification: Afib/flutter RVR    Orders Received: give metoprolol early and monitor. No further intervention unless symptomatic.    Comments:

## 2019-03-28 VITALS
HEIGHT: 65 IN | HEART RATE: 139 BPM | SYSTOLIC BLOOD PRESSURE: 103 MMHG | WEIGHT: 135.3 LBS | BODY MASS INDEX: 22.54 KG/M2 | OXYGEN SATURATION: 92 % | DIASTOLIC BLOOD PRESSURE: 75 MMHG | TEMPERATURE: 98.8 F | RESPIRATION RATE: 16 BRPM

## 2019-03-28 LAB
ANION GAP SERPL CALCULATED.3IONS-SCNC: 4 MMOL/L (ref 3–14)
BUN SERPL-MCNC: 13 MG/DL (ref 7–30)
CALCIUM SERPL-MCNC: 8.4 MG/DL (ref 8.5–10.1)
CHLORIDE SERPL-SCNC: 102 MMOL/L (ref 94–109)
CO2 SERPL-SCNC: 29 MMOL/L (ref 20–32)
CREAT SERPL-MCNC: 0.68 MG/DL (ref 0.52–1.04)
GFR SERPL CREATININE-BSD FRML MDRD: 79 ML/MIN/{1.73_M2}
GLUCOSE SERPL-MCNC: 89 MG/DL (ref 70–99)
INR PPP: 2.73 (ref 0.86–1.14)
POTASSIUM SERPL-SCNC: 3.2 MMOL/L (ref 3.4–5.3)
POTASSIUM SERPL-SCNC: 4 MMOL/L (ref 3.4–5.3)
SODIUM SERPL-SCNC: 135 MMOL/L (ref 133–144)

## 2019-03-28 PROCEDURE — 99232 SBSQ HOSP IP/OBS MODERATE 35: CPT | Performed by: INTERNAL MEDICINE

## 2019-03-28 PROCEDURE — 85610 PROTHROMBIN TIME: CPT | Performed by: HOSPITALIST

## 2019-03-28 PROCEDURE — A9270 NON-COVERED ITEM OR SERVICE: HCPCS | Mod: GY | Performed by: HOSPITALIST

## 2019-03-28 PROCEDURE — 99239 HOSP IP/OBS DSCHRG MGMT >30: CPT | Performed by: INTERNAL MEDICINE

## 2019-03-28 PROCEDURE — 84132 ASSAY OF SERUM POTASSIUM: CPT | Performed by: INTERNAL MEDICINE

## 2019-03-28 PROCEDURE — 36415 COLL VENOUS BLD VENIPUNCTURE: CPT | Performed by: HOSPITALIST

## 2019-03-28 PROCEDURE — 36415 COLL VENOUS BLD VENIPUNCTURE: CPT | Performed by: INTERNAL MEDICINE

## 2019-03-28 PROCEDURE — A9270 NON-COVERED ITEM OR SERVICE: HCPCS | Mod: GY | Performed by: INTERNAL MEDICINE

## 2019-03-28 PROCEDURE — 25000132 ZZH RX MED GY IP 250 OP 250 PS 637: Mod: GY | Performed by: INTERNAL MEDICINE

## 2019-03-28 PROCEDURE — 25000132 ZZH RX MED GY IP 250 OP 250 PS 637: Mod: GY | Performed by: HOSPITALIST

## 2019-03-28 PROCEDURE — 80048 BASIC METABOLIC PNL TOTAL CA: CPT | Performed by: HOSPITALIST

## 2019-03-28 RX ORDER — WARFARIN SODIUM 2 MG/1
2 TABLET ORAL
Status: DISCONTINUED | OUTPATIENT
Start: 2019-03-28 | End: 2019-03-28 | Stop reason: HOSPADM

## 2019-03-28 RX ORDER — POTASSIUM CHLORIDE 1.5 G/1.58G
20 POWDER, FOR SOLUTION ORAL 2 TIMES DAILY
Qty: 60 PACKET | Refills: 1 | Status: SHIPPED | OUTPATIENT
Start: 2019-03-28 | End: 2019-04-10

## 2019-03-28 RX ORDER — DILTIAZEM HCL 60 MG
60 TABLET ORAL EVERY 6 HOURS SCHEDULED
Status: DISCONTINUED | OUTPATIENT
Start: 2019-03-28 | End: 2019-03-28

## 2019-03-28 RX ORDER — DILTIAZEM HYDROCHLORIDE 120 MG/1
120 CAPSULE, EXTENDED RELEASE ORAL DAILY
Qty: 30 CAPSULE | Refills: 1 | DISCHARGE
Start: 2019-03-28 | End: 2019-04-10

## 2019-03-28 RX ORDER — DILTIAZEM HCL 60 MG
60 TABLET ORAL EVERY 6 HOURS SCHEDULED
Status: DISCONTINUED | OUTPATIENT
Start: 2019-03-28 | End: 2019-03-28 | Stop reason: HOSPADM

## 2019-03-28 RX ORDER — FUROSEMIDE 40 MG
40 TABLET ORAL
Qty: 60 TABLET | Refills: 1 | Status: SHIPPED | OUTPATIENT
Start: 2019-03-28 | End: 2019-04-02

## 2019-03-28 RX ORDER — DILTIAZEM HCL 60 MG
60 TABLET ORAL EVERY 6 HOURS
Qty: 30 TABLET | Refills: 1 | Status: SHIPPED | OUTPATIENT
Start: 2019-03-28 | End: 2019-03-28

## 2019-03-28 RX ORDER — OXYCODONE HYDROCHLORIDE 15 MG/1
15 TABLET ORAL EVERY 8 HOURS PRN
Qty: 60 TABLET | Refills: 0 | Status: SHIPPED | OUTPATIENT
Start: 2019-03-28 | End: 2019-04-01

## 2019-03-28 RX ORDER — MICONAZOLE NITRATE 20 MG/G
CREAM TOPICAL
Status: DISCONTINUED | OUTPATIENT
Start: 2019-03-28 | End: 2019-03-28 | Stop reason: HOSPADM

## 2019-03-28 RX ORDER — METOPROLOL SUCCINATE 50 MG/1
150 TABLET, EXTENDED RELEASE ORAL 2 TIMES DAILY
Qty: 180 TABLET | Refills: 3 | Status: SHIPPED | OUTPATIENT
Start: 2019-03-28 | End: 2019-04-10

## 2019-03-28 RX ADMIN — METOPROLOL SUCCINATE 150 MG: 100 TABLET, EXTENDED RELEASE ORAL at 08:31

## 2019-03-28 RX ADMIN — BRINZOLAMIDE/BRIMONIDINE TARTRATE 1 DROP: 10; 2 SUSPENSION/ DROPS OPHTHALMIC at 08:38

## 2019-03-28 RX ADMIN — LATANOPROST 1 DROP: 50 SOLUTION OPHTHALMIC at 08:38

## 2019-03-28 RX ADMIN — DILTIAZEM HYDROCHLORIDE 60 MG: 60 TABLET, FILM COATED ORAL at 08:31

## 2019-03-28 RX ADMIN — POTASSIUM CHLORIDE 40 MEQ: 1500 TABLET, EXTENDED RELEASE ORAL at 06:46

## 2019-03-28 RX ADMIN — DILTIAZEM HYDROCHLORIDE 60 MG: 60 TABLET, FILM COATED ORAL at 12:07

## 2019-03-28 RX ADMIN — ACYCLOVIR 400 MG: 400 TABLET ORAL at 08:31

## 2019-03-28 RX ADMIN — FUROSEMIDE 40 MG: 40 TABLET ORAL at 08:31

## 2019-03-28 RX ADMIN — POTASSIUM CHLORIDE 20 MEQ: 1500 TABLET, EXTENDED RELEASE ORAL at 08:35

## 2019-03-28 ASSESSMENT — ACTIVITIES OF DAILY LIVING (ADL)
ADLS_ACUITY_SCORE: 20

## 2019-03-28 NOTE — PROGRESS NOTES
[facility]    Cardiology Progress Note    Date of Service (when I saw the patient): 03/28/2019     Assessment & Plan   Amira Arreola is a 86 year old female who was admitted on 3/22/2019.        1-acute diastolic heart failure.  Improved.    Has continued to diurese on oral diuretics started 3/26.  Now resolved.  Legs feel good.  No respiratory symptoms.  Back to around her baseline weight of 133 pounds.  Appears diuresis of 12-13 pounds.        2.-Atrial fibrillation, chronic.  RVR at presentation.    Has been difficult to control, beta-blockers escalated.  Developed atypical atrial flutter with RVR last night.  Got a dose of digoxin, no clear response.  Started on oral diltiazem today and now rate very well controlled at 60.    Recommended discharge on current beta-blocker dose and diltiazem extended release 120 mg daily.  Would hold off on digoxin right now.       3-multiple myeloma on therapy.  Per hematology service stable     Recommendation-  Continue current oral diuretic dosing .    Use 133 pound-135 weight as is her goal weight.  Okay from cardiology standpoint to discharge to TCU today.  Metoprolol succinate 150 twice daily and diltiazem extended release 120 mg daily for rate control.  Continue anticoagulation for her A. fib  She should follow-up in core clinic in about a week after discharge.  Recommend she continue some level of pressure stockings.    Pantera Jordan M.D.    Interval History   Legs feel great she is happy with how they look.  She is not having orthopnea or shortness of breath.  Appetite reasonable.  Denies other new symptoms.  Not really aware when she is going to rapid atrial fibrillation or flutter.    Physical Exam   Vital Signs with Ranges  Temp:  [98.1  F (36.7  C)-99.4  F (37.4  C)] 98.8  F (37.1  C)  Pulse:  [137-144] 139  Heart Rate:  [] 73  Resp:  [16] 16  BP: (103-133)/(66-98) 103/75  SpO2:  [92 %-96 %] 92 %  Vitals:    03/25/19 0530 03/26/19 0630  "03/27/19 0545   Weight: 66 kg (145 lb 9.6 oz) 60.2 kg (132 lb 12.8 oz) 61.4 kg (135 lb 4.8 oz)     No intake/output data recorded.    Vitals: /75 (BP Location: Right arm)   Pulse 139   Temp 98.8  F (37.1  C) (Oral)   Resp 16   Ht 1.651 m (5' 5\")   Wt 61.4 kg (135 lb 4.8 oz)   SpO2 92%   BMI 22.52 kg/m      Constitutional: No acute distress.  Normal respiratory effort    Chest:   Occasional crackle at the right base and may be left base.  Minimal.  Reasonable air movement.  No wheeze or increased effort    Cardiac: Irregular rhythm.  No JVD or HJR    Abdomen:   Nondistended nontender    Vascular/Extremities: Currently essentially no edema.  No ulceration or significant skin change    Recent Labs   Lab 03/23/19  0708 03/23/19  0406 03/22/19  2353   TROPI <0.015 0.016 0.016       Recent Labs   Lab 03/28/19  0532 03/27/19  0623 03/26/19  0538 03/25/19  1510 03/25/19  0603 03/24/19  0533 03/23/19  0708 03/23/19  0406 03/22/19  2353 03/22/19  2044   WBC  --   --   --   --   --  9.5 6.6  --   --  8.1   HGB  --   --   --   --   --  10.5* 10.6*  --   --  11.4*   MCV  --   --   --   --   --  94 97  --   --  98   PLT  --   --   --   --   --  151 138*  --   --  175   INR 2.73* 2.55* 2.13*  --  2.06* 1.88* 2.19*  --   --  2.16*     --  139  --  140 142 145*  --   --  144   POTASSIUM 3.2*  --  3.6 4.0 3.2* 3.5 3.5  --   --  3.9   CHLORIDE 102  --  105  --  106 108 111*  --   --  110*   CO2 29  --  29  --  28 28 26  --   --  28   BUN 13  --  15  --  13 17 15  --   --  18   CR 0.68  --  0.66  --  0.69 0.75 0.74  --   --  0.76   GFRESTIMATED 79  --  80  --  79 72 73  --   --  71   GFRESTBLACK >90  --  >90  --  >90 84 85  --   --  82   ANIONGAP 4  --  5  --  6 6 8  --   --  6   BRADLEY 8.4*  --  9.5  --  8.9 8.9 8.8  --   --  9.1   GLC 89  --  108*  --  102* 117* 113*  --   --  108*   TROPI  --   --   --   --   --   --  <0.015 0.016 0.016  --        No results found for this or any previous visit (from the past 48 " hour(s)).    No results found for this or any previous visit (from the past 4320 hour(s)).    Medications     Current Facility-Administered Medications   Medication Dose Route Frequency     acyclovir  400 mg Oral BID     brinzolamide-brimonidine  1 drop Right Eye BID     diltiazem  60 mg Oral Q6H KRISTIN     furosemide  40 mg Oral BID     latanoprost  1 drop Right Eye Daily     metoprolol succinate ER  150 mg Oral BID     sodium chloride (PF)  3 mL Intracatheter Q8H     warfarin  2 mg Oral ONCE at 18:00         Current Facility-Administered Medications   Medication Last Rate     Reason anticoagulant not prescribed for atrial fibrillation       Warfarin Therapy Reminder

## 2019-03-28 NOTE — PLAN OF CARE
Pt left via wheelchair transport for [facility] @ 0930. All discharge instructions, scripts and packet given with transport.

## 2019-03-28 NOTE — PLAN OF CARE
VSS except HR initially 140s, then down to 90-100s couple hrs after diltiazem started, now HR 55-60s. Tele: A fib/flutter RVR. RA. Disoriented to specific date and part of situation. Denies pain. Lymph wraps to BLE. Incontinent of urine. Diminished LS. Up Ax1 walker. Tolerating cardiac diet. Plan for discharge to [facility] at 1600.

## 2019-03-28 NOTE — PROGRESS NOTES
SW:  D:  Received discharge orders for patient.  Bed available at [facility] for today.  Patient will transport, via the skyway, around 16:00 today.  Patient informed of the plan and in agreement to the plan.  Call placed to update patient's daughter Yesi, [phone], as to this information and she is in agreement.  Call placed to update [facility] and faxed the orders and the PAS.    PAS-RR    D: Per DHS regulation, LUIGI completed and submitted PAS-RR to [place] [employer] Direct Connect via the Senior LinkAge Line.  PAS-RR confirmation # is : [id number].    I: LUIGI spoke with patient and daughter Yesi and they are aware a PAS-RR has been submitted.  LUIGI reviewed with patient and daughter Yesi  that they may be contacted for a follow up appointment within 10 days of hospital discharge if their SNF stay is < 30 days.  Contact information for [facility] Line was also provided.    A: Patient and daughter Yesi verbalized understanding.    P: Further questions may be directed to [facility] Line at #[phone], option #4 for PAS-RR staff.

## 2019-03-28 NOTE — PLAN OF CARE
Pt A&O x2 (self and place), VSS except tachycardic with HR's in 140s consistently throughout the evening. Cardiologist paged and ordered one time dose of digoxin to be given, not effective. Tele-A flutter RVR. Up with A-1 and walker. Pt denies CP and SOB but has some SALAZAR. IV saline locked, lymphedema wraps on LE. Pt is incontinent of urine and was checked and changed throughout shift. Pt has been NPO in case MD decides to do procedure for elevated rate. Original plan was for pt to discharge to [facility] today.

## 2019-03-28 NOTE — PLAN OF CARE
OT/CR: Pt discharging to TCU today. Will defer further therapy to next level of care.   Occupational Therapy Discharge Summary    Reason for therapy discharge:    Discharging to TCU     Progress towards therapy goal(s). See goals on Care Plan in [facility] electronic health record for goal details.  Goals not met.  Barriers to achieving goals:   discharge from facility.    Therapy recommendation(s):    Continued therapy is recommended.  Rationale/Recommendations:  Skilled OT in TCU setting to address independence in I/ADLs as pt is not at baseline.

## 2019-03-29 ENCOUNTER — DOCUMENTATION ONLY (OUTPATIENT)
Dept: CARDIOLOGY | Facility: CLINIC | Age: 84
End: 2019-03-29

## 2019-03-29 ENCOUNTER — HOSPITAL LABORATORY (OUTPATIENT)
Dept: OTHER | Facility: CLINIC | Age: 84
End: 2019-03-29

## 2019-03-29 ENCOUNTER — NURSING HOME VISIT (OUTPATIENT)
Dept: GERIATRICS | Facility: CLINIC | Age: 84
End: 2019-03-29
Payer: MEDICARE

## 2019-03-29 ENCOUNTER — PATIENT OUTREACH (OUTPATIENT)
Dept: ONCOLOGY | Facility: CLINIC | Age: 84
End: 2019-03-29

## 2019-03-29 VITALS
BODY MASS INDEX: 22.47 KG/M2 | DIASTOLIC BLOOD PRESSURE: 81 MMHG | HEART RATE: 129 BPM | SYSTOLIC BLOOD PRESSURE: 132 MMHG | RESPIRATION RATE: 17 BRPM | WEIGHT: 135 LBS | TEMPERATURE: 97.1 F | OXYGEN SATURATION: 97 %

## 2019-03-29 DIAGNOSIS — C90.00 MULTIPLE MYELOMA NOT HAVING ACHIEVED REMISSION (H): ICD-10-CM

## 2019-03-29 DIAGNOSIS — Z86.19 HISTORY OF SHINGLES: ICD-10-CM

## 2019-03-29 DIAGNOSIS — E87.6 HYPOKALEMIA: ICD-10-CM

## 2019-03-29 DIAGNOSIS — I50.31 ACUTE DIASTOLIC CHF (CONGESTIVE HEART FAILURE) (H): ICD-10-CM

## 2019-03-29 DIAGNOSIS — D64.9 CHRONIC ANEMIA: ICD-10-CM

## 2019-03-29 DIAGNOSIS — G89.29 OTHER CHRONIC PAIN: ICD-10-CM

## 2019-03-29 DIAGNOSIS — S22.000D CLOSED COMPRESSION FRACTURE OF THORACIC VERTEBRA WITH ROUTINE HEALING, SUBSEQUENT ENCOUNTER: ICD-10-CM

## 2019-03-29 DIAGNOSIS — R53.81 PHYSICAL DECONDITIONING: ICD-10-CM

## 2019-03-29 DIAGNOSIS — I48.20 CHRONIC ATRIAL FIBRILLATION (H): Primary | ICD-10-CM

## 2019-03-29 LAB
ANION GAP SERPL CALCULATED.3IONS-SCNC: 7 MMOL/L (ref 3–14)
BUN SERPL-MCNC: 17 MG/DL (ref 7–30)
CALCIUM SERPL-MCNC: 8.6 MG/DL (ref 8.5–10.1)
CHLORIDE SERPL-SCNC: 106 MMOL/L (ref 94–109)
CO2 SERPL-SCNC: 27 MMOL/L (ref 20–32)
CREAT SERPL-MCNC: 0.7 MG/DL (ref 0.52–1.04)
GFR SERPL CREATININE-BSD FRML MDRD: 77 ML/MIN/{1.73_M2}
GLUCOSE SERPL-MCNC: 82 MG/DL (ref 70–99)
HGB BLD-MCNC: 12.2 G/DL (ref 11.7–15.7)
INTERPRETATION ECG - MUSE: NORMAL
NT-PROBNP SERPL-MCNC: 4439 PG/ML (ref 0–450)
POTASSIUM SERPL-SCNC: 4 MMOL/L (ref 3.4–5.3)
SODIUM SERPL-SCNC: 140 MMOL/L (ref 133–144)

## 2019-03-29 PROCEDURE — 99310 SBSQ NF CARE HIGH MDM 45: CPT | Performed by: NURSE PRACTITIONER

## 2019-03-29 NOTE — LETTER
3/29/2019        RE: Amira Arreola  [address]  [place] [address]        [facility]  PRIMARY CARE PROVIDER AND CLINIC:  Addy Frias MD, [address] / [place] [address]  Chief Complaint   Patient presents with     Hospital F/U     [facility] Medical Record Number:  [id number]  Place of Service where encounter took place:  [facility] [[id number]]    Amira Arreola  is a 86 year old  (7/17/1932), admitted to the above facility from  [facility]. Hospital stay 3/22/19 through 3/28/19..  Admitted to this facility for  rehab, medical management and nursing care.    HPI:    HPI information obtained from: facility chart records, facility staff, patient report and [facility] chart review.     Brief Summary of Hospital Course:   86 year old female with h/o Multiple Myeloma mets to bone (sees Dr Roberts and chemo held until out of TCU), chronic AF, HTN, multiple prior Orthopedics surgeries, shingles hospitalized with dyspnea, leg swelling, and intermittent palpitations and is found to have afib with RVR and acute dHF. Treated with lasix, diltiazem, lopressor and coumadin, KCL replacement and to see [facility] one week. On oxy PRN back pain (thoracic compression fx) and BID acyclovir for shingles prophylaxis. Now at TCU.     Updates on Status Since Skilled nursing Admission:   Since admission reports feeling better. On room air and sats 97%. No dyspnea, chest pain. Occasionally elevated HR with range of  but controlled during our visit today. SNP range 103-140. Reports no edema, no bowel or bladder issues. She lives with her  and son in her own home. Has been up with walker in recent weeks. Sees oncology Dr Roberts - chemo meds on hold during TCU stay. INR checked today and is 2.3 in good range. No reports of bleeding or bruising. No fevers. Denies pain while at rest in bed.     CODE STATUS/ADVANCE DIRECTIVES DISCUSSION:    CPR/Full code   Patient's living condition: lives with family, child(ezra); adult; spouse (son)    ALLERGIES: Blood transfusion related (informational only) and Penicillin [penicillins]  PAST MEDICAL HISTORY:  has a past medical history of Abnormal CXR (), Ascending aorta dilatation (H) (2016), Cancer, metastatic to bone (H), Colonic polyp (), Compression fracture (), Dry eyes, Elevated MCV (), HTN (hypertension) (), Lung nodule (2018), Menorrhagia (), MGUS (monoclonal gammopathy of unknown significance) (), Multiple myeloma (H) (), OAB (overactive bladder) (), Osteoporosis, Palpitations (), Paroxysmal atrial fibrillation (H) (), Sciatica of left side (), Shingles (), SVT (supraventricular tachycardia) (H) (), and Thrombocytopenia (H) (). She also has no past medical history of PONV (postoperative nausea and vomiting).  PAST SURGICAL HISTORY:   has a past surgical history that includes hysteroscopy and d and c (); right ankle surgery (); cataract iol, rt/lt (); left anle replacement (); Colonoscopy (2013);  section (1965, ); EXCISE EXOSTOSIS TIBIA / FIBULA (2014); and Bone marrow biopsy, bone specimen, needle/trocar (N/A, 2016).  FAMILY HISTORY: family history includes C.A.D. in her mother; Cerebrovascular Disease in her brother; Family History Negative in her brother, sister, and sister; Heart Disease in her father.  SOCIAL HISTORY:   reports that  has never smoked. she has never used smokeless tobacco. She reports that she does not drink alcohol or use drugs.    Post Discharge Medication Reconciliation Status: discharge medications reconciled, continue medications without change    Current Outpatient Medications   Medication Sig Dispense Refill     Acetaminophen (TYLENOL PO) Take 500 mg by mouth every 6 hours as needed for mild pain or fever       acyclovir (ZOVIRAX) 400 MG tablet Take 400 mg by mouth 2  times daily       calcium citrate-vitamin D (CITRACAL) 315-250 MG-UNIT TABS per tablet Take 2 tablets by mouth daily       Carboxymethylcellulose Sod PF (REFRESH PLUS) 0.5 % SOLN ophthalmic solution 1 drop 4 times daily as needed for dry eyes       dexamethasone (DECADRON) 4 MG tablet Take 20mg (5 tablets) by mouth every week.. (Patient taking differently: Take 20mg (5 tablets) by mouth every week (takes on Wednesdays).) 28 tablet 0     diltiazem ER (DILT-XR) 120 MG 24 hr capsule Take 1 capsule (120 mg) by mouth daily 30 capsule 1     furosemide (LASIX) 40 MG tablet Take 1 tablet (40 mg) by mouth 2 times daily 60 tablet 1     latanoprost (XALATAN) 0.005 % ophthalmic solution Place 1 drop into the right eye daily   6     lidocaine-prilocaine (EMLA) cream Apply to port site 1 hour prior to access 30 g 1     metoprolol succinate ER (TOPROL-XL) 50 MG 24 hr tablet Take 3 tablets (150 mg) by mouth 2 times daily 180 tablet 3     Multiple Vitamin (DAILY MULTIVITAMIN PO) Take 1 tablet by mouth daily.       oxyCODONE IR (ROXICODONE) 15 MG tablet Take 1 tablet (15 mg) by mouth every 8 hours as needed for pain maximum 4 tablet(s) per day 60 tablet 0     polyethylene glycol (MIRALAX/GLYCOLAX) powder Take 1 capful by mouth daily as needed        potassium chloride (KLOR-CON) 20 MEQ packet Take 20 mEq by mouth 2 times daily 60 packet 1     prochlorperazine (COMPAZINE) 10 MG tablet Take 1 tablet (10 mg) by mouth every 6 hours as needed for nausea or vomiting 30 tablet 1     SIMBRINZA 1-0.2 % ophthalmic suspension Place 1 drop into the right eye 2 times daily 1 drop AM and PM  2     Sodium Fluoride (SF 5000 PLUS) 1.1 % CREA Apply to affected area 3 times daily       vitamin D3 (VITAMIN D3) 1000 units (25 mcg) tablet Take 1,000 Units by mouth daily       warfarin (COUMADIN) 4 MG tablet Take 4 mg by mouth daily Saturday and Tuesday       warfarin (COUMADIN) 4 MG tablet Take 2 mg by mouth daily Sunday, Monday, Wednesday, Thursday,  Friday       Zoledronic Acid (ZOMETA IV) Inject into the vein every 3 months          ROS:  10 point ROS of systems including Constitutional, Eyes, Respiratory, Cardiovascular, Gastroenterology, Genitourinary, Integumentary, Musculoskeletal, Psychiatric were all negative except for pertinent positives noted in my HPI.    Vitals:  /81   Pulse 129   Temp 97.1  F (36.2  C)   Resp 17   Wt 61.2 kg (135 lb)   SpO2 97%   BMI 22.47 kg/m     Exam:  GENERAL APPEARANCE:  Alert, in no distress, pleasant, cooperative, oriented x 4  EYES:  EOM, lids, pupils and irises normal, sclera clear and conjunctiva normal, no discharge or mattering on lids or lashes noted  ENT:  Mouth normal, moist mucous membranes, nose normal without drainage or crusting, external ears without lesions, hearing acuity intact  NECK: supple, symmetrical, trachea midline  RESP:  respiratory effort normal, no chest wall tenderness, no respiratory distress, Lung sounds clear, patient is on room air  CV:  Auscultation of heart done, rate and rhythm controlled and irregular, no murmur, no rub or gallop. Edema none bilateral lower extremities.   ABDOMEN:  normal bowel sounds, soft, nontender, no palpable masses.  M/S:   Gait and station walks with assist , no tenderness or swelling of the joints; able to move all extremities, digits normal  SKIN:  Inspection and palpation of skin and subcutaneous tissue: skin on extremities warm, dry and intact without rashes  NEURO: cranial nerves 2-12 grossly intact, no facial asymmetry, no speech deficits and able to follow directions, moves all extremities symmetrically  PSYCH:  insight and judgement intact, memory at baseline, affect and mood normal     Lab/Diagnostic data:    Most Recent 3 CBC's:  Recent Labs   Lab Test 03/29/19  0751 03/24/19  0533 03/23/19  0708 03/22/19  2044   WBC  --  9.5 6.6 8.1   HGB 12.2 10.5* 10.6* 11.4*   MCV  --  94 97 98   PLT  --  151 138* 175     Most Recent 3 BMP's:  Recent Labs    Lab Test 03/29/19  0751 03/28/19  1224 03/28/19  0532 03/26/19  0538     --  135 139   POTASSIUM 4.0 4.0 3.2* 3.6   CHLORIDE 106  --  102 105   CO2 27  --  29 29   BUN 17  --  13 15   CR 0.70  --  0.68 0.66   ANIONGAP 7  --  4 5   BRADLEY 8.6  --  8.4* 9.5   GLC 82  --  89 108*       ASSESSMENT/PLAN:  Current [place] scheduled appointments:  Next 5 appointments (look out 90 days)    Apr 11, 2019 10:40 AM CDT  Return Visit with Shayne Roberts MD  [facility] ([facility]  [address] [facility] [address]  [facility] [address] [phone]         Chronic atrial fibrillation (H)  Acute diastolic CHF (congestive heart failure) (H)  Hypokalemia  Acute on chronic. Patient diuresed, meds adjusted. Now at [facility] - breathing improved, no edema. She is to see [facility] clinic within a week. Labs today stable/improved. Continues Coumadin - INR today 2.3 will continue orders per home routine 4 mg Tue and Sat and 2 mg other days, INR on 4/1. Also continue vs, wt as ordered and diltiazem 120 mg PO daily, lasix 40 mg BID, KCL 20 meq PO BID, metoprolol 150 mg PO BID. Next labs ordered by cards for 4/2.     Multiple myeloma not having achieved remission (H)  Chronic anemia  Chronic - chemo meds on hold at [facility]. Does take Dexamethasone 20 mg weekly on wednesdays. Dr Roberts f/u scheduled 4/11.     Other chronic pain  Closed compression fracture of thoracic vertebra with routine healing, subsequent encounter  By history, denies pain today. Continue PRN Oxy and tylenol. Staff to update provider if not effective.     History of shingles  On twice daily acyclovir no stop date    Physical deconditioning  Acute on chronic, recent illness and now at [facility]. Therapies - f/u with progress next week.      Orders written by provider at facility  1. INR 2.3 diagnosis a fib. Coumadin 4 mg PO Tue and Sat and 2 mg PO other days. Check INR on 4/1  2. Per cards request rechecking BMP, BNP and CBC on  4/1    Total time spent with patient visit at the skilled nursing facility was 36 min including patient visit, review of past records and conversation with nursing and therapy staff re status and concerns. Discussed current status, hospital course, plan of care at facility with patient including a fib management (coumadin and rate control) as well as therapy goals     Electronically signed by:  DAYSI Quevedo CNP                         Sincerely,        DAYSI Quevedo CNP

## 2019-03-29 NOTE — PROGRESS NOTES
Patient's initial [facility] enrollment appt w/Nikki rescheduled d/t double booked slot. Appointment now will be on 4/2 at 0810 w/Elisabeth Means NP. Labs will be done @ [facility] on 4/1. [place] staff notified.  Bernarda Lira RN on 3/29/2019 at 11:31 AM

## 2019-03-29 NOTE — PROGRESS NOTES
[facility]  PRIMARY CARE PROVIDER AND CLINIC:  Addy Frias MD, [address] / [place] [address]  Chief Complaint   Patient presents with     Hospital F/U     [facility] Medical Record Number:  [id number]  Place of Service where encounter took place:  [facility] [[id number]]    Amira Arreola  is a 86 year old  (7/17/1932), admitted to the above facility from  [facility]. Hospital stay 3/22/19 through 3/28/19..  Admitted to this facility for  rehab, medical management and nursing care.    HPI:    HPI information obtained from: facility chart records, facility staff, patient report and [facility] chart review.     Brief Summary of Hospital Course:   86 year old female with h/o Multiple Myeloma mets to bone (sees Dr Roberts and chemo held until out of TCU), chronic AF, HTN, multiple prior Orthopedics surgeries, shingles hospitalized with dyspnea, leg swelling, and intermittent palpitations and is found to have afib with RVR and acute dHF. Treated with lasix, diltiazem, lopressor and coumadin, KCL replacement and to see [facility] one week. On oxy PRN back pain (thoracic compression fx) and BID acyclovir for shingles prophylaxis. Now at TCU.     Updates on Status Since Skilled nursing Admission:   Since admission reports feeling better. On room air and sats 97%. No dyspnea, chest pain. Occasionally elevated HR with range of  but controlled during our visit today. SNP range 103-140. Reports no edema, no bowel or bladder issues. She lives with her  and son in her own home. Has been up with walker in recent weeks. Sees oncology Dr Roberts - chemo meds on hold during TCU stay. INR checked today and is 2.3 in good range. No reports of bleeding or bruising. No fevers. Denies pain while at rest in bed.     CODE STATUS/ADVANCE DIRECTIVES DISCUSSION:   CPR/Full code   Patient's living condition: lives with family, child(ezra); adult; spouse (son)     ALLERGIES: Blood transfusion related (informational only) and Penicillin [penicillins]  PAST MEDICAL HISTORY:  has a past medical history of Abnormal CXR (), Ascending aorta dilatation (H) (2016), Cancer, metastatic to bone (H), Colonic polyp (), Compression fracture (), Dry eyes, Elevated MCV (), HTN (hypertension) (), Lung nodule (2018), Menorrhagia (), MGUS (monoclonal gammopathy of unknown significance) (), Multiple myeloma (H) (), OAB (overactive bladder) (), Osteoporosis, Palpitations (), Paroxysmal atrial fibrillation (H) (), Sciatica of left side (), Shingles (), SVT (supraventricular tachycardia) (H) (), and Thrombocytopenia (H) (). She also has no past medical history of PONV (postoperative nausea and vomiting).  PAST SURGICAL HISTORY:   has a past surgical history that includes hysteroscopy and d and c (); right ankle surgery (); cataract iol, rt/lt (); left anle replacement (); Colonoscopy (2013);  section (1965, ); EXCISE EXOSTOSIS TIBIA / FIBULA (2014); and Bone marrow biopsy, bone specimen, needle/trocar (N/A, 2016).  FAMILY HISTORY: family history includes C.A.D. in her mother; Cerebrovascular Disease in her brother; Family History Negative in her brother, sister, and sister; Heart Disease in her father.  SOCIAL HISTORY:   reports that  has never smoked. she has never used smokeless tobacco. She reports that she does not drink alcohol or use drugs.    Post Discharge Medication Reconciliation Status: discharge medications reconciled, continue medications without change    Current Outpatient Medications   Medication Sig Dispense Refill     Acetaminophen (TYLENOL PO) Take 500 mg by mouth every 6 hours as needed for mild pain or fever       acyclovir (ZOVIRAX) 400 MG tablet Take 400 mg by mouth 2 times daily       calcium citrate-vitamin D (CITRACAL) 315-250 MG-UNIT TABS per tablet Take 2  tablets by mouth daily       Carboxymethylcellulose Sod PF (REFRESH PLUS) 0.5 % SOLN ophthalmic solution 1 drop 4 times daily as needed for dry eyes       dexamethasone (DECADRON) 4 MG tablet Take 20mg (5 tablets) by mouth every week.. (Patient taking differently: Take 20mg (5 tablets) by mouth every week (takes on Wednesdays).) 28 tablet 0     diltiazem ER (DILT-XR) 120 MG 24 hr capsule Take 1 capsule (120 mg) by mouth daily 30 capsule 1     furosemide (LASIX) 40 MG tablet Take 1 tablet (40 mg) by mouth 2 times daily 60 tablet 1     latanoprost (XALATAN) 0.005 % ophthalmic solution Place 1 drop into the right eye daily   6     lidocaine-prilocaine (EMLA) cream Apply to port site 1 hour prior to access 30 g 1     metoprolol succinate ER (TOPROL-XL) 50 MG 24 hr tablet Take 3 tablets (150 mg) by mouth 2 times daily 180 tablet 3     Multiple Vitamin (DAILY MULTIVITAMIN PO) Take 1 tablet by mouth daily.       oxyCODONE IR (ROXICODONE) 15 MG tablet Take 1 tablet (15 mg) by mouth every 8 hours as needed for pain maximum 4 tablet(s) per day 60 tablet 0     polyethylene glycol (MIRALAX/GLYCOLAX) powder Take 1 capful by mouth daily as needed        potassium chloride (KLOR-CON) 20 MEQ packet Take 20 mEq by mouth 2 times daily 60 packet 1     prochlorperazine (COMPAZINE) 10 MG tablet Take 1 tablet (10 mg) by mouth every 6 hours as needed for nausea or vomiting 30 tablet 1     SIMBRINZA 1-0.2 % ophthalmic suspension Place 1 drop into the right eye 2 times daily 1 drop AM and PM  2     Sodium Fluoride (SF 5000 PLUS) 1.1 % CREA Apply to affected area 3 times daily       vitamin D3 (VITAMIN D3) 1000 units (25 mcg) tablet Take 1,000 Units by mouth daily       warfarin (COUMADIN) 4 MG tablet Take 4 mg by mouth daily Saturday and Tuesday       warfarin (COUMADIN) 4 MG tablet Take 2 mg by mouth daily Sunday, Monday, Wednesday, Thursday, Friday       Zoledronic Acid (ZOMETA IV) Inject into the vein every 3 months          ROS:  10  point ROS of systems including Constitutional, Eyes, Respiratory, Cardiovascular, Gastroenterology, Genitourinary, Integumentary, Musculoskeletal, Psychiatric were all negative except for pertinent positives noted in my HPI.    Vitals:  /81   Pulse 129   Temp 97.1  F (36.2  C)   Resp 17   Wt 61.2 kg (135 lb)   SpO2 97%   BMI 22.47 kg/m    Exam:  GENERAL APPEARANCE:  Alert, in no distress, pleasant, cooperative, oriented x 4  EYES:  EOM, lids, pupils and irises normal, sclera clear and conjunctiva normal, no discharge or mattering on lids or lashes noted  ENT:  Mouth normal, moist mucous membranes, nose normal without drainage or crusting, external ears without lesions, hearing acuity intact  NECK: supple, symmetrical, trachea midline  RESP:  respiratory effort normal, no chest wall tenderness, no respiratory distress, Lung sounds clear, patient is on room air  CV:  Auscultation of heart done, rate and rhythm controlled and irregular, no murmur, no rub or gallop. Edema none bilateral lower extremities.   ABDOMEN:  normal bowel sounds, soft, nontender, no palpable masses.  M/S:   Gait and station walks with assist , no tenderness or swelling of the joints; able to move all extremities, digits normal  SKIN:  Inspection and palpation of skin and subcutaneous tissue: skin on extremities warm, dry and intact without rashes  NEURO: cranial nerves 2-12 grossly intact, no facial asymmetry, no speech deficits and able to follow directions, moves all extremities symmetrically  PSYCH:  insight and judgement intact, memory at baseline, affect and mood normal     Lab/Diagnostic data:    Most Recent 3 CBC's:  Recent Labs   Lab Test 03/29/19  0751 03/24/19  0533 03/23/19  0708 03/22/19  2044   WBC  --  9.5 6.6 8.1   HGB 12.2 10.5* 10.6* 11.4*   MCV  --  94 97 98   PLT  --  151 138* 175     Most Recent 3 BMP's:  Recent Labs   Lab Test 03/29/19  0751 03/28/19  1224 03/28/19  0532 03/26/19  0538     --  135 139    POTASSIUM 4.0 4.0 3.2* 3.6   CHLORIDE 106  --  102 105   CO2 27  --  29 29   BUN 17  --  13 15   CR 0.70  --  0.68 0.66   ANIONGAP 7  --  4 5   BRADLEY 8.6  --  8.4* 9.5   GLC 82  --  89 108*       ASSESSMENT/PLAN:  Current [facility] scheduled appointments:  Next 5 appointments (look out 90 days)    Apr 11, 2019 10:40 AM CDT  Return Visit with Shayne Roberts MD  [facility] ([facility]  [address]  [place] [id number] [phone]         Chronic atrial fibrillation (H)  Acute diastolic CHF (congestive heart failure) (H)  Hypokalemia  Acute on chronic. Patient diuresed, meds adjusted. Now at TCU - breathing improved, no edema. She is to see [facility] clinic within a week. Labs today stable/improved. Continues Coumadin - INR today 2.3 will continue orders per home routine 4 mg Tue and Sat and 2 mg other days, INR on 4/1. Also continue vs, wt as ordered and diltiazem 120 mg PO daily, lasix 40 mg BID, KCL 20 meq PO BID, metoprolol 150 mg PO BID. Next labs ordered by cards for 4/2.     Multiple myeloma not having achieved remission (H)  Chronic anemia  Chronic - chemo meds on hold at TCU. Does take Dexamethasone 20 mg weekly on wednesdays. Dr Roberts f/u scheduled 4/11.     Other chronic pain  Closed compression fracture of thoracic vertebra with routine healing, subsequent encounter  By history, denies pain today. Continue PRN Oxy and tylenol. Staff to update provider if not effective.     History of shingles  On twice daily acyclovir no stop date    Physical deconditioning  Acute on chronic, recent illness and now at U. Therapies - f/u with progress next week.      Orders written by provider at facility  1. INR 2.3 diagnosis a fib. Coumadin 4 mg PO Tue and Sat and 2 mg PO other days. Check INR on 4/1  2. Per cards request rechecking BMP, BNP and CBC on 4/1    Total time spent with patient visit at the skilled nursing facility was 36 min including  patient visit, review of past records and conversation with nursing and therapy staff re status and concerns. Discussed current status, hospital course, plan of care at facility with patient including a fib management (coumadin and rate control) as well as therapy goals     Electronically signed by:  DAYSI Quevedo CNP

## 2019-03-29 NOTE — PROGRESS NOTES
Received call from Trisha Lima ph: [phone] stating that she was under the understanding that Pt[facility]    Confirmed that that is what Hospitalist  had written in his Discharge Summary on 3-28-19:    Currently she is on Daratumab, Velcade, and Dexamethasone every 2 weeks.  - oncology consulted, chemo will be held until discharged from TCU      Encouraged Trisha Lima to revisit this on Monday, 4-8-19, as Pt is not scheduled for her next Darzalex until 4-11-19, to see if Pt is still in TCU or has improved enough to be discharged from TCU.    Will route this to 's RN LIZETH Masters to review on 4-8-19.

## 2019-04-01 ENCOUNTER — HOSPITAL LABORATORY (OUTPATIENT)
Dept: OTHER | Facility: CLINIC | Age: 84
End: 2019-04-01

## 2019-04-01 ENCOUNTER — DOCUMENTATION ONLY (OUTPATIENT)
Dept: OTHER | Facility: CLINIC | Age: 84
End: 2019-04-01

## 2019-04-01 ENCOUNTER — NURSING HOME VISIT (OUTPATIENT)
Dept: GERIATRICS | Facility: CLINIC | Age: 84
End: 2019-04-01
Payer: MEDICARE

## 2019-04-01 ENCOUNTER — TELEPHONE (OUTPATIENT)
Dept: ONCOLOGY | Facility: CLINIC | Age: 84
End: 2019-04-01

## 2019-04-01 VITALS
TEMPERATURE: 97.3 F | SYSTOLIC BLOOD PRESSURE: 111 MMHG | HEART RATE: 128 BPM | BODY MASS INDEX: 24.63 KG/M2 | WEIGHT: 148 LBS | RESPIRATION RATE: 12 BRPM | OXYGEN SATURATION: 97 % | DIASTOLIC BLOOD PRESSURE: 73 MMHG

## 2019-04-01 VITALS
RESPIRATION RATE: 12 BRPM | SYSTOLIC BLOOD PRESSURE: 111 MMHG | DIASTOLIC BLOOD PRESSURE: 73 MMHG | OXYGEN SATURATION: 97 % | HEART RATE: 128 BPM | WEIGHT: 134.8 LBS | TEMPERATURE: 97.3 F | BODY MASS INDEX: 22.43 KG/M2

## 2019-04-01 DIAGNOSIS — C90.00 MULTIPLE MYELOMA NOT HAVING ACHIEVED REMISSION (H): ICD-10-CM

## 2019-04-01 DIAGNOSIS — I50.33 ACUTE ON CHRONIC DIASTOLIC CONGESTIVE HEART FAILURE (H): Primary | ICD-10-CM

## 2019-04-01 DIAGNOSIS — S22.000G CLOSED COMPRESSION FRACTURE OF THORACIC VERTEBRA WITH DELAYED HEALING, SUBSEQUENT ENCOUNTER: ICD-10-CM

## 2019-04-01 DIAGNOSIS — I48.0 PAROXYSMAL ATRIAL FIBRILLATION (H): ICD-10-CM

## 2019-04-01 DIAGNOSIS — Z79.01 LONG TERM CURRENT USE OF ANTICOAGULANT THERAPY: ICD-10-CM

## 2019-04-01 DIAGNOSIS — R53.81 PHYSICAL DECONDITIONING: ICD-10-CM

## 2019-04-01 PROBLEM — S22.000A CLOSED COMPRESSION FRACTURE OF THORACIC VERTEBRA (H): Status: ACTIVE | Noted: 2019-04-01

## 2019-04-01 LAB
ANION GAP SERPL CALCULATED.3IONS-SCNC: 6 MMOL/L (ref 3–14)
BUN SERPL-MCNC: 22 MG/DL (ref 7–30)
CALCIUM SERPL-MCNC: 8.9 MG/DL (ref 8.5–10.1)
CHLORIDE SERPL-SCNC: 105 MMOL/L (ref 94–109)
CO2 SERPL-SCNC: 27 MMOL/L (ref 20–32)
CREAT SERPL-MCNC: 0.89 MG/DL (ref 0.52–1.04)
GFR SERPL CREATININE-BSD FRML MDRD: 58 ML/MIN/{1.73_M2}
GLUCOSE SERPL-MCNC: 93 MG/DL (ref 70–99)
NT-PROBNP SERPL-MCNC: 3865 PG/ML (ref 0–1800)
POTASSIUM SERPL-SCNC: 3.9 MMOL/L (ref 3.4–5.3)
SODIUM SERPL-SCNC: 138 MMOL/L (ref 133–144)

## 2019-04-01 PROCEDURE — 99309 SBSQ NF CARE MODERATE MDM 30: CPT | Performed by: NURSE PRACTITIONER

## 2019-04-01 RX ORDER — OXYCODONE HYDROCHLORIDE 5 MG/1
5 TABLET ORAL EVERY 4 HOURS PRN
Start: 2019-04-01 | End: 2019-04-11

## 2019-04-01 RX ORDER — DEXAMETHASONE 4 MG/1
TABLET ORAL
Qty: 28 TABLET | Refills: 0
Start: 2019-04-01 | End: 2019-04-02

## 2019-04-01 NOTE — LETTER
4/1/2019        RE: Amira Arreola  [address]  [facility] [address]        [facility]  [facility] Record Number:  [id number]  Place of Service where encounter took place[facility] [[id number]]  Chief Complaint   Patient presents with     RECHECK       HPI:    Amira Arreola  is a 86 year old (7/17/1932), who is being seen today for an episodic care visit.  HPI information obtained from: facility chart records, facility staff, patient report and [facility] chart review. Today's concern is:  Brief synopsis of hospital stay per Rosa Perez NP;  86 year old female with h/o Multiple Myeloma mets to bone (sees Dr Roberts and chemo held until out of TCU), chronic AF, HTN, multiple prior Orthopedics surgeries,  hospitalized with dyspnea, leg swelling, and intermittent palpitations and is found to have afib with RVR and acute dHF. Treated with lasix, diltiazem, lopressor and coumadin, KCL replacement and to see [facility] one week. On oxy PRN back pain (thoracic compression fx) and BID acyclovir for shingles prophylaxis. Now at TCU.  Acute on chronic diastolic congestive heart failure (H)   She was diuresed in hospital with IV lasix. Sent to TCU on oral lasix 40mg BID. She was enrolled in [facility] clinic.   No shortness of breath today.   Paroxysmal atrial fibrillation (H)  She required diltiazem and increased metoprolol while in patient. She was sent to TCU on oral diltiazem and increased dose of metoprolol  HR in , 76, 138, 48  BPs 111/73, 101/54, 95/56  Long term current use of anticoagulant therapy  INR today 2.3  Warfarin PTA Regimen: 4 mg Sat, Tues; 2 mg ROW    Multiple myeloma not having achieved remission (H)  Diagnosed 2016. Follows with Dr Roberts. Chemotherapy on hold. Weekly decadron was confirmed today by Dr Roberts.     Closed compression fracture of thoracic vertebra with delayed healing, subsequent encounter  She does have periodic back  pain and takes occasional oxycodone. Nursing reports she was quite somnolent after taking 15mg of oxycodone.     Physical deconditioning  Lives with  in house. She does not climb stairs at home. She does have help at home with ADLs.       Past Medical and Surgical History reviewed in Epic today.    MEDICATIONS:  Current Outpatient Medications   Medication Sig Dispense Refill     Acetaminophen (TYLENOL PO) Take 500 mg by mouth every 6 hours as needed for mild pain or fever       acyclovir (ZOVIRAX) 400 MG tablet Take 400 mg by mouth 2 times daily       Carboxymethylcellulose Sod PF (REFRESH PLUS) 0.5 % SOLN ophthalmic solution 1 drop 4 times daily as needed for dry eyes       dexamethasone (DECADRON) 4 MG tablet Take 20mg (5 tablets) by mouth every week.. (Patient not taking: Reported on 4/1/2019.) 28 tablet 0     diltiazem ER (DILT-XR) 120 MG 24 hr capsule Take 1 capsule (120 mg) by mouth daily 30 capsule 1     furosemide (LASIX) 40 MG tablet Take 1 tablet (40 mg) by mouth 2 times daily 60 tablet 1     latanoprost (XALATAN) 0.005 % ophthalmic solution Place 1 drop into the right eye daily   6     lidocaine-prilocaine (EMLA) cream Apply to port site 1 hour prior to access 30 g 1     metoprolol succinate ER (TOPROL-XL) 50 MG 24 hr tablet Take 3 tablets (150 mg) by mouth 2 times daily 180 tablet 3     Multiple Vitamin (DAILY MULTIVITAMIN PO) Take 1 tablet by mouth daily.       oxyCODONE IR (ROXICODONE) 5 MG tablet Take 1 tablet (5 mg) by mouth every 4 hours as needed for pain maximum 4 tablet(s) per day       polyethylene glycol (MIRALAX/GLYCOLAX) powder Take 17 g by mouth daily as needed        potassium chloride (KLOR-CON) 20 MEQ packet Take 20 mEq by mouth 2 times daily 60 packet 1     prochlorperazine (COMPAZINE) 10 MG tablet Take 1 tablet (10 mg) by mouth every 6 hours as needed for nausea or vomiting 30 tablet 1     SIMBRINZA 1-0.2 % ophthalmic suspension Place 1 drop into the right eye 2 times daily 1  drop AM and PM  2     Sodium Fluoride (SF 5000 PLUS) 1.1 % CREA Apply to affected area 3 times daily       vitamin D3 (VITAMIN D3) 1000 units (25 mcg) tablet Take 1,000 Units by mouth daily       warfarin (COUMADIN) 4 MG tablet Take 4 mg by mouth daily Saturday and Tuesday       warfarin (COUMADIN) 4 MG tablet Take 2 mg by mouth daily Sunday, Monday, Wednesday, Thursday, Friday       Dexamethasone (DECADRON PO) Take by mouth See Admin Instructions Give 20 mg every wed.       Zoledronic Acid (ZOMETA IV) Inject into the vein every 3 months        Wt Readings from Last 5 Encounters:   04/01/19 61.1 kg (134 lb 12.8 oz)   03/29/19 61.2 kg (135 lb)   03/27/19 61.4 kg (135 lb 4.8 oz)   03/13/19 69.5 kg (153 lb 3.2 oz)   03/01/19 64.9 kg (143 lb)       REVIEW OF SYSTEMS:  4 point ROS including Respiratory, CV, GI and , other than that noted in the HPI,  is negative    Objective:  /73   Pulse 128   Temp 97.3  F (36.3  C)   Resp 12   Wt 61.1 kg (134 lb 12.8 oz)   SpO2 97%   BMI 22.43 kg/m     Exam:  GENERAL APPEARANCE:  Alert, in no distress  RESP:  respiratory effort and palpation of chest normal, lungs clear to auscultation   CV:  Palpation and auscultation of heart done , irregular rhythm HR 70, 1+ LE edema  ABDOMEN:  normal bowel sounds, soft, nontender, no hepatosplenomegaly or other masses  M/S:   Gait and station abnormal LE weakness  Digits and nails normal  SKIN:  Inspection of skin and subcutaneous tissue baseline, Palpation of skin and subcutaneous tissue baseline  NEURO:   Cranial nerves 2-12 are normal tested and grossly at patient's baseline, Examination of sensation by touch normal  PSYCH:  insight and judgement impaired, memory impaired     Labs:   Labs done in SNF are in [facility] EPIC. Please refer to them using [facility] Everywhere.    ASSESSMENT/PLAN:  (I50.33) Acute on chronic diastolic congestive heart failure (H)  (primary encounter diagnosis)  Comment: recent hospitalization due to HF. She  was diuresed in hospital. Today she has no shortness of breath. She did have BNP today 3865. She continues to have shortness of breath.  Plan: daily wts.   Follow up with [facility] clinic 4/2  Continue lasix 40mg BID for now.     (I48.0) Paroxysmal atrial fibrillation (H)  Comment: required addition of diltiazem in addition to Metop 150mg BID upon transfer to TCU.   Plan: monitor HR.     (Z79.01) Long term current use of anticoagulant therapy  Comment: INR therapeutic  Plan: continue PTA dose. INR on 4/4    (C90.00) Multiple myeloma not having achieved remission (H)  Comment: chemo on hold. Weekly dexamethasone confirmed with DR Roberts's office.   Plan:         Continue plan of care    (S22.000G) Closed compression fracture of thoracic vertebra with delayed healing, subsequent encounter  Comment: having some pain but getting somnolent on current dose of oxycodone  Plan: decrease oxycodone 5mg q 4 h prn.     (R53.81) Physical deconditioning  Comment: due to hospitalization and MM with HF. Lives with  in house  Plan: physical therapy and OCCUPATIONAL THERAPY       Electronically signed by:  DAYSI Marquez CNP               Sincerely,        DAYSI Marquez CNP

## 2019-04-01 NOTE — PROGRESS NOTES
[facility]  [place] Medical Record Number:  [id number]  Place of Service where encounter took place:  [facility] [[id number]]  Chief Complaint   Patient presents with     RECHECK       HPI:    Amira Arreola  is a 86 year old (7/17/1932), who is being seen today for an episodic care visit.  HPI information obtained from: facility chart records, facility staff, patient report and [facility] chart review. Today's concern is:  Brief synopsis of hospital stay per Rosa Perez NP;  86 year old female with h/o Multiple Myeloma mets to bone (sees Dr Roberts and chemo held until out of TCU), chronic AF, HTN, multiple prior Orthopedics surgeries,  hospitalized with dyspnea, leg swelling, and intermittent palpitations and is found to have afib with RVR and acute dHF. Treated with lasix, diltiazem, lopressor and coumadin, KCL replacement and to see CORE one week. On oxy PRN back pain (thoracic compression fx) and BID acyclovir for shingles prophylaxis. Now at TCU.  Acute on chronic diastolic congestive heart failure (H)   She was diuresed in hospital with IV lasix. Sent to TCU on oral lasix 40mg BID. She was enrolled in CORE clinic.   No shortness of breath today.   Paroxysmal atrial fibrillation (H)  She required diltiazem and increased metoprolol while in patient. She was sent to TCU on oral diltiazem and increased dose of metoprolol  HR in , 76, 138, 48  BPs 111/73, 101/54, 95/56  Long term current use of anticoagulant therapy  INR today 2.3  Warfarin PTA Regimen: 4 mg Sat, Tues; 2 mg ROW    Multiple myeloma not having achieved remission (H)  Diagnosed 2016. Follows with Dr Roberts. Chemotherapy on hold. Weekly decadron was confirmed today by Dr Roberts.     Closed compression fracture of thoracic vertebra with delayed healing, subsequent encounter  She does have periodic back pain and takes occasional oxycodone. Nursing reports she was quite somnolent after taking 15mg of  oxycodone.     Physical deconditioning  Lives with  in house. She does not climb stairs at home. She does have help at home with ADLs.       Past Medical and Surgical History reviewed in Epic today.    MEDICATIONS:  Current Outpatient Medications   Medication Sig Dispense Refill     Acetaminophen (TYLENOL PO) Take 500 mg by mouth every 6 hours as needed for mild pain or fever       acyclovir (ZOVIRAX) 400 MG tablet Take 400 mg by mouth 2 times daily       Carboxymethylcellulose Sod PF (REFRESH PLUS) 0.5 % SOLN ophthalmic solution 1 drop 4 times daily as needed for dry eyes       dexamethasone (DECADRON) 4 MG tablet Take 20mg (5 tablets) by mouth every week.. (Patient not taking: Reported on 4/1/2019.) 28 tablet 0     diltiazem ER (DILT-XR) 120 MG 24 hr capsule Take 1 capsule (120 mg) by mouth daily 30 capsule 1     furosemide (LASIX) 40 MG tablet Take 1 tablet (40 mg) by mouth 2 times daily 60 tablet 1     latanoprost (XALATAN) 0.005 % ophthalmic solution Place 1 drop into the right eye daily   6     lidocaine-prilocaine (EMLA) cream Apply to port site 1 hour prior to access 30 g 1     metoprolol succinate ER (TOPROL-XL) 50 MG 24 hr tablet Take 3 tablets (150 mg) by mouth 2 times daily 180 tablet 3     Multiple Vitamin (DAILY MULTIVITAMIN PO) Take 1 tablet by mouth daily.       oxyCODONE IR (ROXICODONE) 5 MG tablet Take 1 tablet (5 mg) by mouth every 4 hours as needed for pain maximum 4 tablet(s) per day       polyethylene glycol (MIRALAX/GLYCOLAX) powder Take 17 g by mouth daily as needed        potassium chloride (KLOR-CON) 20 MEQ packet Take 20 mEq by mouth 2 times daily 60 packet 1     prochlorperazine (COMPAZINE) 10 MG tablet Take 1 tablet (10 mg) by mouth every 6 hours as needed for nausea or vomiting 30 tablet 1     SIMBRINZA 1-0.2 % ophthalmic suspension Place 1 drop into the right eye 2 times daily 1 drop AM and PM  2     Sodium Fluoride (SF 5000 PLUS) 1.1 % CREA Apply to affected area 3 times daily        vitamin D3 (VITAMIN D3) 1000 units (25 mcg) tablet Take 1,000 Units by mouth daily       warfarin (COUMADIN) 4 MG tablet Take 4 mg by mouth daily Saturday and Tuesday       warfarin (COUMADIN) 4 MG tablet Take 2 mg by mouth daily Sunday, Monday, Wednesday, Thursday, Friday       Dexamethasone (DECADRON PO) Take by mouth See Admin Instructions Give 20 mg every wed.       Zoledronic Acid (ZOMETA IV) Inject into the vein every 3 months        Wt Readings from Last 5 Encounters:   04/01/19 61.1 kg (134 lb 12.8 oz)   03/29/19 61.2 kg (135 lb)   03/27/19 61.4 kg (135 lb 4.8 oz)   03/13/19 69.5 kg (153 lb 3.2 oz)   03/01/19 64.9 kg (143 lb)       REVIEW OF SYSTEMS:  4 point ROS including Respiratory, CV, GI and , other than that noted in the HPI,  is negative    Objective:  /73   Pulse 128   Temp 97.3  F (36.3  C)   Resp 12   Wt 61.1 kg (134 lb 12.8 oz)   SpO2 97%   BMI 22.43 kg/m    Exam:  GENERAL APPEARANCE:  Alert, in no distress  RESP:  respiratory effort and palpation of chest normal, lungs clear to auscultation   CV:  Palpation and auscultation of heart done , irregular rhythm HR 70, 1+ LE edema  ABDOMEN:  normal bowel sounds, soft, nontender, no hepatosplenomegaly or other masses  M/S:   Gait and station abnormal LE weakness  Digits and nails normal  SKIN:  Inspection of skin and subcutaneous tissue baseline, Palpation of skin and subcutaneous tissue baseline  NEURO:   Cranial nerves 2-12 are normal tested and grossly at patient's baseline, Examination of sensation by touch normal  PSYCH:  insight and judgement impaired, memory impaired     Labs:   Labs done in SNF are in [place] EPIC. Please refer to them using [employer] Everywhere.    ASSESSMENT/PLAN:  (I50.33) Acute on chronic diastolic congestive heart failure (H)  (primary encounter diagnosis)  Comment: recent hospitalization due to HF. She was diuresed in hospital. Today she has no shortness of breath. She did have BNP today 3865. She  continues to have shortness of breath.  Plan: daily wts.   Follow up with CORe clinic 4/2  Continue lasix 40mg BID for now.     (I48.0) Paroxysmal atrial fibrillation (H)  Comment: required addition of diltiazem in addition to Metop 150mg BID upon transfer to TCU.   Plan: monitor HR.     (Z79.01) Long term current use of anticoagulant therapy  Comment: INR therapeutic  Plan: continue PTA dose. INR on 4/4    (C90.00) Multiple myeloma not having achieved remission (H)  Comment: chemo on hold. Weekly dexamethasone confirmed with DR Roberts's office.   Plan:         Continue plan of care    (S22.000G) Closed compression fracture of thoracic vertebra with delayed healing, subsequent encounter  Comment: having some pain but getting somnolent on current dose of oxycodone  Plan: decrease oxycodone 5mg q 4 h prn.     (R53.81) Physical deconditioning  Comment: due to hospitalization and MM with HF. Lives with  in house  Plan: physical therapy and OCCUPATIONAL THERAPY       Electronically signed by:  DAYSI Marquez CNP

## 2019-04-01 NOTE — TELEPHONE ENCOUNTER
NP Autumn called to ask if pt should continue her decadron while in facility. She had not been getting chemo while inpatient at facility.   Dr. Roberts directs pt to continue decadron dose every Wednesday as ordered.   Left a message for Autumn with these directions.   Trini Ramirez

## 2019-04-02 ENCOUNTER — CARE COORDINATION (OUTPATIENT)
Dept: CARDIOLOGY | Facility: CLINIC | Age: 84
End: 2019-04-02

## 2019-04-02 ENCOUNTER — OFFICE VISIT (OUTPATIENT)
Dept: CARDIOLOGY | Facility: CLINIC | Age: 84
End: 2019-04-02
Attending: INTERNAL MEDICINE
Payer: MEDICARE

## 2019-04-02 ENCOUNTER — NURSING HOME VISIT (OUTPATIENT)
Dept: GERIATRICS | Facility: CLINIC | Age: 84
End: 2019-04-02
Payer: MEDICARE

## 2019-04-02 ENCOUNTER — MEDICAL CORRESPONDENCE (OUTPATIENT)
Dept: HEALTH INFORMATION MANAGEMENT | Facility: CLINIC | Age: 84
End: 2019-04-02

## 2019-04-02 VITALS
OXYGEN SATURATION: 100 % | HEART RATE: 74 BPM | HEIGHT: 65 IN | SYSTOLIC BLOOD PRESSURE: 86 MMHG | WEIGHT: 129 LBS | DIASTOLIC BLOOD PRESSURE: 56 MMHG | BODY MASS INDEX: 21.49 KG/M2

## 2019-04-02 DIAGNOSIS — I50.33 ACUTE ON CHRONIC DIASTOLIC CONGESTIVE HEART FAILURE (H): Primary | ICD-10-CM

## 2019-04-02 DIAGNOSIS — I34.0 MITRAL VALVE INSUFFICIENCY, UNSPECIFIED ETIOLOGY: ICD-10-CM

## 2019-04-02 DIAGNOSIS — C90.00 MULTIPLE MYELOMA NOT HAVING ACHIEVED REMISSION (H): ICD-10-CM

## 2019-04-02 DIAGNOSIS — I50.9 ACUTE ON CHRONIC CONGESTIVE HEART FAILURE, UNSPECIFIED HEART FAILURE TYPE (H): ICD-10-CM

## 2019-04-02 DIAGNOSIS — R53.81 PHYSICAL DECONDITIONING: ICD-10-CM

## 2019-04-02 DIAGNOSIS — I50.9 ACUTE ON CHRONIC CONGESTIVE HEART FAILURE, UNSPECIFIED HEART FAILURE TYPE (H): Primary | ICD-10-CM

## 2019-04-02 DIAGNOSIS — I10 BENIGN ESSENTIAL HYPERTENSION: ICD-10-CM

## 2019-04-02 DIAGNOSIS — I27.20 PULMONARY HYPERTENSION (H): ICD-10-CM

## 2019-04-02 DIAGNOSIS — I48.0 PAROXYSMAL ATRIAL FIBRILLATION (H): ICD-10-CM

## 2019-04-02 DIAGNOSIS — S22.000G CLOSED COMPRESSION FRACTURE OF THORACIC VERTEBRA WITH DELAYED HEALING, SUBSEQUENT ENCOUNTER: ICD-10-CM

## 2019-04-02 DIAGNOSIS — I07.1 TRICUSPID VALVE INSUFFICIENCY, UNSPECIFIED ETIOLOGY: ICD-10-CM

## 2019-04-02 DIAGNOSIS — G89.29 OTHER CHRONIC PAIN: ICD-10-CM

## 2019-04-02 DIAGNOSIS — I48.20 CHRONIC ATRIAL FIBRILLATION (H): ICD-10-CM

## 2019-04-02 PROCEDURE — 99306 1ST NF CARE HIGH MDM 50: CPT | Performed by: INTERNAL MEDICINE

## 2019-04-02 PROCEDURE — 99214 OFFICE O/P EST MOD 30 MIN: CPT | Performed by: NURSE PRACTITIONER

## 2019-04-02 RX ORDER — FUROSEMIDE 40 MG
40 TABLET ORAL DAILY
Qty: 60 TABLET | Refills: 1
Start: 2019-04-02 | End: 2019-04-10

## 2019-04-02 ASSESSMENT — MIFFLIN-ST. JEOR: SCORE: 1026.02

## 2019-04-02 NOTE — PROGRESS NOTES
Elisabeth Means, APRN CNP  [facility] Core Nurse             Hi,     Patient seen for CORE enrollment. Blood pressure low which wasn't recorded until after patient left. Trisha sent no paper work with for visit.     Can you please call Trisha :   1. Get BP and weight readings from Trisha   2. DECREASE furosemide to 40mg daily   3. Fax lab order for BMP prior to CORE visit next week   4. Please call and check on weight/BP/symptoms Friday.     ThanksElisabeth      See care coordination note from 4/2. Orders faxed. Reminder sent to board to call for update on Friday. Lab order faxed for BMP on 4/8. Yohan HENSLEY April 2, 2019, 2:02 PM

## 2019-04-02 NOTE — PATIENT INSTRUCTIONS
Call CORE nurse for any questions or concerns Mon-Fri 8am-4pm:                                                [phone]                                       For concerns after hours:                                                 [phone]     Medication changes: None today  Plan from today: follow up in 1 week with Elisabeth  Lab results: see attached; kidney function stable.

## 2019-04-02 NOTE — LETTER
4/2/2019    Addy Frias MD  [address]  [facility] [address]    RE: Amira Arreola       Dear Colleague,    I had the pleasure of seeing Amira Arreola in the [facility].    Cardiology Clinic Progress Note  Amira Arreola MRN# [id number]   YOB: 1932 Age: 86 year old   Primary Cardiologist: Dr. Rivera Reason for visit: CORE enrollment            Assessment and Plan:   Amira Arreola is a very pleasant 86 year old female with a history of HFpEF, chronic atrial fibrillation, hypertension, and hx of multiple myeloma mets to bone (dx 2016, currently being treated with Daratumab, Velcade, and Dexamethasone every 2 weeks). Hospitalized 3/22/19-3/28/19 presented with dyspnea, leg swelling and intermittent palpitations. Patient was found to have atrial fibrillation with RVR and acute diastolic heart failure exacerbation. Patient here today for CORE enrollment post hospitalization.     The mainstays of therapy for HFpEF include volume management, blood pressure control, treating underlying sleep apnea if present, treatment of underlying CAD if within the goals of care, weight management, exercise training, rate control for atrial fibrillation as well as consideration for a rhythm control strategy, as well as consideration for clinical trials.       1.  Chronic diastolic heart failure/HFpEF - Echocardiogram completed 3/23/19 LVEF 55-60%, no wall motion abnormalities, moderate mitral regurgitation, moderate tricuspid regurgitation, and severe pulmonary hypertension. Discharge weight 135#, weight today 129#. Patient appears compensated and hypovolemic. Kidney function stable but slight bump note creatinine 0.89 with BUN 22, baseline creatinine 0.6-0.7. Blood pressure 86/56 and HR 74 in clinic today. Bump in creatinine and low blood pressure concerning for over diuresis, will decrease diuretic regimen today.    - NYHA class III, stage C   - Fluid status :  hypovolemic   - Diuretic regimen : DECREASE furosemide to 40mg daily   - Aldosterone antagonist : will consider in the future if worsening symptoms, currently no room in blood pressure.    - Blood pressure : hypotensive, likely secondary to over diuresis   - Start bilateral lower extremity compression wraps, patient notes she was initially using after discharge but notes none recently.    - Bilateral pleural effusion noted on CXR while hospitalized, currently with no oxygen requirements or complaints of dyspnea but activity has been limited. If complaints of dyspnea noted, consider repeat CXR and thoracentesis, on exam today bilateral lung bases still diminished likely secondary to pleural effusions.    - HF education given, provided with written education materials.     2. Chronic atrial fibrillation - stable, asymptomatic, rate controlled, during hospitalization metoprolol succinate was increased to 150mg BID and started on diltiazem XR 120mg daily to achieve better rate control. This appears to be managing rate control well, only concern today is hypotension, which could be secondary to overdiuresis. Will decrease furosemide and monitor closely. If hypotension persists will likely need to decrease her rate controlling medications slighlty.    - [id number] score 5 (HTN, HF, age, female)   - Continue warfarin for thromboembolic prophylaxis.     3. Hypertension - hypotensive today (asymptomatic), likely secondary to overdiuresis but also could be secondary to rate control medications which were adjusted during recent hospitalization.     4. Moderate mitral regurgitation, moderate tricuspid regurgitation   - Will consider repeat echocardiogram now that patient is euvolemic.      5. Severe pulmonary hypertension - likely secondary to HFpEF and likely improved with diuresis.    - Will consider repeat echocardiogram now that patient is euvolemic.     5. Multiple myeloma with mets to bone - follows with Dr. Roberts,  currently being treated with Daratumab, Velcade, and Dexamethasone every 2 weeks   - Patient reports next treatment scheduled for 4/11/19        Changes today: DECREASE furosemide to 40mg daily    Follow up plan:     CORE RN to call and get recent weights/blood pressures.    CORE followup with Elisabeth in 1 week    CORE RN to call [place] on Friday to check on weight/BP/symptoms.         History of Presenting Illness:    Amira Arreola is a very pleasant 86 year old female with a history of HFpEF, chronic atrial fibrillation, hypertension, and hx of multiple myeloma mets to bone (dx 2016, currently being treated with Daratumab, Velcade, and Dexamethasone every 2 weeks).     Hospitalized 3/22/19-3/28/19 presented with dyspnea, leg swelling and intermittent palpitations. Patient was found to have atrial fibrillation with RVR and acute diastolic heart failure exacerbation. BNP elevated at 4828, CXR with bilateral moderate effusions with infiltrates vs. Atelectasis. Echocardiogram completed 3/23/19 LVEF 55-60%, no wall motion abnormalities, moderate mitral regurgitation, moderate tricuspid regurgitation, and severe pulmonary hypertension. Oral diltiazem started and metoprolol succinate escalated while hospitalized to help with rate control. Diuresed with IV furosemide and discharged on PO furosemide 40mg BID. Weight 3/24/19 147# (doesn't appear admission weight was completed). Discharge weight 135#. Discharged to TCU.     Patient is here today for CORE enrollment. Patients  and son present during visit.     Patient reports feeling good. Currently at TCU. Monitoring weights daily at [facility]. No paperwork sent with patient for visit. No weights or medications available for review during clinic visit. Reports improvement in lower extremity edema, still noting some edema in feet/ankles. Complaints of abdominal bloating, which she states has not been improving. Reports normal bowel movements. Denies shortness of  breath at rest. Notes her activity has been limited, but denies shortness of breath with current levels of activity. Sleeping good. Denies orthopnea or PND. Sleeping with HOB slightly elevated. Feels some improvement in energy.     Patient denies chest pain or chest tightness. Denies dizziness, lightheadedness or other presyncopal symptoms. Denies tachycardia or palpitations. Next cycle of chemo therapy 4/11/19. Prior to hospitalization was living at home independently with her .     Labs from yesterday show stable kidney function, slight bump in 0.89 with BUN 22, baseline creatinine 0.6-0.7. Blood pressure 86/56 and HR 74 in clinic today. Bump in creatinine and low blood pressure concerning for over diuresis, will decrease diuretic regimen today.     Appetite is good. Not adding salt to foods. Working with PT/OT. No tobacco or alcohol use.         Recent Hospitalizations   3/22/19-3/28/19 presented with dyspnea, leg swelling and intermittent palpitations. Patient was found to have atrial fibrillation with RVR and acute diastolic heart failure exacerbation. Weight 3/24/19 147# (doesn't appear admission weight was completed). Discharge weight 135#.        Social History    , 6 children, Enjoys keeping the house clean and orderly. Retired nurse.   Social History     Socioeconomic History     Marital status:      Spouse name: Tom     Number of children: 6     Years of education: Not on file     Highest education level: Not on file   Occupational History     Occupation: rn anesthetist     Employer: RETIRED   Social Needs     Financial resource strain: Not on file     Food insecurity:     Worry: Not on file     Inability: Not on file     Transportation needs:     Medical: Not on file     Non-medical: Not on file   Tobacco Use     Smoking status: Never Smoker     Smokeless tobacco: Never Used   Substance and Sexual Activity     Alcohol use: No     Alcohol/week: 0.0 oz     Drug use: No     Sexual  "activity: No   Lifestyle     Physical activity:     Days per week: Not on file     Minutes per session: Not on file     Stress: Not on file   Relationships     Social connections:     Talks on phone: Not on file     Gets together: Not on file     Attends Mandaen service: Not on file     Active member of club or organization: Not on file     Attends meetings of clubs or organizations: Not on file     Relationship status: Not on file     Intimate partner violence:     Fear of current or ex partner: Not on file     Emotionally abused: Not on file     Physically abused: Not on file     Forced sexual activity: Not on file   Other Topics Concern     Parent/sibling w/ CABG, MI or angioplasty before 65F 55M? Not Asked   Social History Narrative     Not on file            Review of Systems:   Skin:  Positive for bruising   Eyes:  Positive for glasses  ENT:  Negative    Respiratory:  Negative    Cardiovascular:    Positive for;palpitations;lightheadedness;edema  Gastroenterology: Negative    Genitourinary:  not assessed    Musculoskeletal:  Positive for arthritis  Neurologic:  Negative    Psychiatric:  Positive for anxiety  Heme/Lymph/Imm:  Positive for    Endocrine:  Negative           Physical Exam:   Vitals: BP (!) 86/56   Pulse 74   Ht 1.651 m (5' 5\")   Wt 58.5 kg (129 lb)   SpO2 100%   BMI 21.47 kg/m      Wt Readings from Last 4 Encounters:   04/02/19 58.5 kg (129 lb)   04/01/19 61.1 kg (134 lb 12.8 oz)   03/29/19 61.2 kg (135 lb)   03/27/19 61.4 kg (135 lb 4.8 oz)     GEN: well nourished, in no acute distress.  HEENT:  Pupils equal, round. Sclerae nonicteric.   NECK: Supple, no masses appreciated. No JVD appreciated on exam at 90 degrees  C/V:  Irregularly irregular rate and rhythm, no murmur, rub or gallop.   RESP: Respirations are unlabored. Diminished bilaterally in bases.   GI: Abdomen distended, nontender.  EXTREM: Trace to +1 LE edema to bilateral ankles  NEURO: Alert and oriented, cooperative.  SKIN: Warm " and dry.        Data:   ECHO 3/23/19  The left ventricle is normal in size.  The visual ejection fraction is estimated at 55-60%.  No regional wall motion abnormalities noted.  There is moderate (2+) mitral regurgitation.  There is moderate (2+) tricuspid regurgitation.  Severe (>55mmHg) pulmonary hypertension is present.  The rhythm was rapid atrial fibrillation.    LIPID RESULTS:  Lab Results   Component Value Date    CHOL 160 10/12/2016    HDL 76 10/12/2016    LDL 71 10/12/2016    TRIG 66 10/12/2016    CHOLHDLRATIO 2.3 09/21/2015     LIVER ENZYME RESULTS:  Lab Results   Component Value Date    AST 34 03/13/2019    ALT 68 (H) 03/13/2019     CBC RESULTS:  Lab Results   Component Value Date    WBC 9.5 03/24/2019    RBC 3.35 (L) 03/24/2019    HGB 12.2 03/29/2019    HCT 31.5 (L) 03/24/2019    MCV 94 03/24/2019    MCH 31.3 03/24/2019    MCHC 33.3 03/24/2019    RDW 14.7 03/24/2019     03/24/2019     BMP RESULTS:  Lab Results   Component Value Date     04/01/2019    POTASSIUM 3.9 04/01/2019    CHLORIDE 105 04/01/2019    CO2 27 04/01/2019    ANIONGAP 6 04/01/2019    GLC 93 04/01/2019    BUN 22 04/01/2019    CR 0.89 04/01/2019    GFRESTIMATED 58 (L) 04/01/2019    GFRESTBLACK 68 04/01/2019    BRADLEY 8.9 04/01/2019      A1C RESULTS:  No results found for: A1C  INR RESULTS:  Lab Results   Component Value Date    INR 2.73 (H) 03/28/2019    INR 2.55 (H) 03/27/2019            Medications     Current Outpatient Medications   Medication Sig Dispense Refill     Acetaminophen (TYLENOL PO) Take 500 mg by mouth every 6 hours as needed for mild pain or fever       acyclovir (ZOVIRAX) 400 MG tablet Take 400 mg by mouth 2 times daily       Carboxymethylcellulose Sod PF (REFRESH PLUS) 0.5 % SOLN ophthalmic solution 1 drop 4 times daily as needed for dry eyes       Dexamethasone (DECADRON PO) Take by mouth See Admin Instructions Give 20 mg every wed.       diltiazem ER (DILT-XR) 120 MG 24 hr capsule Take 1 capsule (120 mg) by  mouth daily 30 capsule 1     furosemide (LASIX) 40 MG tablet Take 1 tablet (40 mg) by mouth 2 times daily 60 tablet 1     latanoprost (XALATAN) 0.005 % ophthalmic solution Place 1 drop into the right eye daily   6     lidocaine-prilocaine (EMLA) cream Apply to port site 1 hour prior to access 30 g 1     metoprolol succinate ER (TOPROL-XL) 50 MG 24 hr tablet Take 3 tablets (150 mg) by mouth 2 times daily 180 tablet 3     Multiple Vitamin (DAILY MULTIVITAMIN PO) Take 1 tablet by mouth daily.       oxyCODONE IR (ROXICODONE) 5 MG tablet Take 1 tablet (5 mg) by mouth every 4 hours as needed for pain maximum 4 tablet(s) per day       polyethylene glycol (MIRALAX/GLYCOLAX) powder Take 17 g by mouth daily as needed        potassium chloride (KLOR-CON) 20 MEQ packet Take 20 mEq by mouth 2 times daily 60 packet 1     prochlorperazine (COMPAZINE) 10 MG tablet Take 1 tablet (10 mg) by mouth every 6 hours as needed for nausea or vomiting 30 tablet 1     SIMBRINZA 1-0.2 % ophthalmic suspension Place 1 drop into the right eye 2 times daily 1 drop AM and PM  2     Sodium Fluoride (SF 5000 PLUS) 1.1 % CREA Apply to affected area 3 times daily       vitamin D3 (VITAMIN D3) 1000 units (25 mcg) tablet Take 1,000 Units by mouth daily       warfarin (COUMADIN) 4 MG tablet Take 4 mg by mouth daily Saturday and Tuesday       warfarin (COUMADIN) 4 MG tablet Take 2 mg by mouth daily Sunday, Monday, Wednesday, Thursday, Friday       Zoledronic Acid (ZOMETA IV) Inject into the vein every 3 months             Past Medical History     Past Medical History:   Diagnosis Date     Abnormal CXR 2018    then ct done and not significant     Ascending aorta dilatation (H) 04/2016    on echo, mild, fu 7/18 4.0, slightly larger     Cancer, metastatic to bone (H)     due to myeloma     Colonic polyp 2008    adenomatous, fu 2013 tics only     Compression fracture 2016    multiple areas of spine     Dry eyes      Elevated MCV 2015    b12 and folic acid nl      HTN (hypertension)     off meds for years     Lung nodule 2018    on ct, 4mm, ct done for fu abnl cxr     Menorrhagia     hysteroscopy and d and c done     MGUS (monoclonal gammopathy of unknown significance)     eval by Dr. Roberts     Multiple myeloma (H) 2016    dx  at [place], bone lesions seen on mri      OAB (overactive bladder)     Dr. Grullon     Osteoporosis     fu done  and stable, went off meds then, fu done ; has had gyn fu and added evista 2013 by gyn     Palpitations     nl echo, mildly dilated asc aorta     Paroxysmal atrial fibrillation (H)     had palp and ziopatch showed it, echo nl lv fxn, mild mr and tr, added coum and toprol, toprol dose raised 16     Sciatica of left side     Dr. Helen Ricardo      SVT (supraventricular tachycardia) (H)     on ziopatch     Thrombocytopenia (H)      Past Surgical History:   Procedure Laterality Date     BONE MARROW BIOPSY, BONE SPECIMEN, NEEDLE/TROCAR N/A 2016    Procedure: BIOPSY BONE MARROW;  Surgeon: Bryan Patel MD;  Location:  GI     CATARACT IOL, RT/LT        SECTION  1965, 1966     COLONOSCOPY  2013    Procedure: COLONOSCOPY;  COLONOSCOPY;  Surgeon: Steffany Rockwell MD;  Location:  GI     EXCISE EXOSTOSIS TIBIA / FIBULA  2014    Procedure: EXCISE EXOSTOSIS TIBIA / FIBULA;  Surgeon: Naila Pichardo MD;  Location:  SD     hysteroscopy and d and c      due to bleeding     left anle replacement       right ankle surgery       Family History   Problem Relation Age of Onset     Heart Disease Father      C.A.D. Mother      Cerebrovascular Disease Brother      Family History Negative Sister      Family History Negative Sister      Family History Negative Brother             Allergies   Blood transfusion related (informational only) and Penicillin [penicillins]        DAYSI Arellano CNP  [facility]  Pager:  [phone]      Thank you for allowing me to participate in the care of your patient.    Sincerely,     DAYSI Arellano [facility]

## 2019-04-02 NOTE — PROGRESS NOTES
Received update on weights and BPs from Trisha.     4/1 (20:52) 117/74  3/30 (20:25) 95/56  3/30 (20:09) 78/54  3/29 (20:19) 107/67  3/28 (1924) 103/62    Weights:  4/2 133.2#  4/1 135.1#  3/31 134.8#  3/30 137#  3/28 135#      Elisabeth updated, and she said pt should decrease furosemide to 40 mg daily. Call Trisha on Friday 4/5 to get an update on pt's weight and BPs. Orders faxed to Trisha at [phone]. Yohan HENSLEY April 2, 2019, 1:55 PM

## 2019-04-02 NOTE — LETTER
4/2/2019        RE: Amira Arreola  [address]  [facility] [address]        Amira Arreola is a 86 year old female seen April 2, 2019 at [facility] where she was admitted this week after [facility] hospitalization for atrial fib with RVR and CHFpEF.     She presented with dyspnea, LE edema and palpitations, was started on diltiazem and metoprolol dose increased to improve VR control; has had lower bps since then with variable HRs.        Patient is seen in her room, up to WC.   Has some difficulty hearing and understanding questions.  Remembers that she went to Cardiology clinic but cannot remember if that was this morning or yesterday.    Denies current CP, dyspnea or palpitations.   By chart review, furosemide dose decreased today at [facility] clinic appointment secondary to hypotension.    She has been working with therapies, low activity tolerance.     Pt also has multiple myeloma with bone metastasis, diagnosed in 2016 by BM biopsy    She follows with Dr Roberts and is treated with daratumumab, Velcade, and dexamethasone every 2 weeks, but on hold since hospitalization.   Receives Zometa every 3 months.  She has had recent weakness and falls, has a thoracic compression fracture with chronic back pain.       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 Roberts     Multiple myeloma (H) 2016    dx 5/16  at [facility], bone lesions seen on mri      OAB (overactive bladder)     Dr. Grullon     Osteoporosis     fu done  and stable, went off meds then, fu done ; has had gyn fu and added evista  by gyn     Palpitations     nl echo, mildly dilated asc aorta     Paroxysmal atrial fibrillation (H)     had palp and ziopatch showed it, echo nl lv fxn, mild mr and tr, added coum and toprol, toprol dose raised 16     Sciatica of left side     Dr. Helen Ricardo      SVT (supraventricular tachycardia) (H)     on ziopatch     Thrombocytopenia (H)        Past Surgical History:   Procedure Laterality Date     BONE MARROW BIOPSY, BONE SPECIMEN, NEEDLE/TROCAR N/A 2016    Procedure: BIOPSY BONE MARROW;  Surgeon: Bryan Patel MD;  Location:  GI     CATARACT IOL, RT/LT        SECTION  1965, 1966     COLONOSCOPY  2013    Procedure: COLONOSCOPY;  COLONOSCOPY;  Surgeon: Steffany Rockwell MD;  Location:  GI     EXCISE EXOSTOSIS TIBIA / FIBULA  2014    Procedure: EXCISE EXOSTOSIS TIBIA / FIBULA;  Surgeon: Naila Pichardo MD;  Location:  SD     hysteroscopy and d and c      due to bleeding     left anle replacement       right ankle surgery         Family History   Problem Relation Age of Onset     Heart Disease Father      C.A.D. Mother      Cerebrovascular Disease Brother      Family History Negative Sister      Family History Negative Sister      Family History Negative Brother      Social History     Tobacco Use     Smoking status: Never Smoker     Smokeless tobacco: Never Used   Substance Use Topics     Alcohol use: No     Alcohol/week: 0.0 oz      SH:  Lives with her  and son, house in [place].   She has 6 children.    Retired nurse    Review Of Systems  Skin: negative   Eyes: impaired vision  Ears/Nose/Throat: hearing loss  Respiratory: + dyspnea on exertion  Cardiovascular: as above  Gastrointestinal:  negative  Genitourinary: negative  Musculoskeletal: generalized weakness, assist with transfers and ambulation      Neurologic: MoCA 13/25     Psychiatric: negative  Hematologic/Lymphatic/Immunologic: anemia  Endocrine: negative      GENERAL APPEARANCE: alert and no distress, frail  /73   Pulse 128   Temp 97.3  F (36.3  C)   Resp 12   Wt 67.1 kg (148 lb)   SpO2 97%   BMI 24.63 kg/m      HEENT: normocephalic, no lesion or abnormalities  NECK: no adenopathy, no asymmetry, masses, or scars and thyroid normal to palpation  RESP: decreased BS, no rales or wheeze  CV: irregular rate and rhythm, normal S1 S2, 1/6 HSM  ABDOMEN:  soft, nontender, no HSM or masses and bowel sounds normal  MS: +kyphosis, 1+ LE edema  SKIN: no suspicious lesions or rashes  NEURO: generalized weakness, limited history    No tremor or stiffness.      PSYCH: affect okay  LYMPHATICS: No cervical,  or supraclavicular nodes    Last Comprehensive Metabolic Panel:  Sodium   Date Value Ref Range Status   04/01/2019 138 133 - 144 mmol/L Final     Potassium   Date Value Ref Range Status   04/01/2019 3.9 3.4 - 5.3 mmol/L Final     Chloride   Date Value Ref Range Status   04/01/2019 105 94 - 109 mmol/L Final     Carbon Dioxide   Date Value Ref Range Status   04/01/2019 27 20 - 32 mmol/L Final     Anion Gap   Date Value Ref Range Status   04/01/2019 6 3 - 14 mmol/L Final     Glucose   Date Value Ref Range Status   04/01/2019 93 70 - 99 mg/dL Final     Urea Nitrogen   Date Value Ref Range Status   04/01/2019 22 7 - 30 mg/dL Final     Creatinine   Date Value Ref Range Status   04/01/2019 0.89 0.52 - 1.04 mg/dL Final     GFR Estimate   Date Value Ref Range Status   04/01/2019 58 (L) >60 mL/min/[1.73_m2] Final     Comment:     Non  GFR Calc  Starting 12/18/2018, serum creatinine based estimated GFR (eGFR) will be   calculated using the Chronic Kidney Disease Epidemiology Collaboration   (CKD-EPI) equation.       Calcium   Date Value  Ref Range Status   04/01/2019 8.9 8.5 - 10.1 mg/dL Final     Lab Results   Component Value Date    WBC 9.5 03/24/2019      HGB 12.2 03/29/2019      MCV 94 03/24/2019       03/24/2019     TSH   Date Value Ref Range Status   03/22/2019 2.31 0.40 - 4.00 mU/L Final      XR CHEST TWO VIEWS   3/22/2019 9:42 PM    IMPRESSION: Lungs are hypoinflated. Medium-sized bilateral pleural  effusions are present, worsened compared to 9/19/2018. Superimposed  bibasilar opacities are present, presumably atelectasis. Underlying  infection, aspiration, or malignancy cannot be excluded. Right chest  port catheter tip remains within the right atrium. Heart size is not  appreciably changed. Mid thoracic compression fracture is unchanged.      ECHO 3/22/19 Interpretation Summary  The left ventricle is normal in size.  The visual ejection fraction is estimated at 55-60%.  No regional wall motion abnormalities noted.  There is moderate (2+) mitral regurgitation.  There is moderate (2+) tricuspid regurgitation.  Severe (>55mmHg) pulmonary hypertension is present.  The rhythm was rapid atrial fibrillation.    IMP/PLAN:    (I50.33) Acute on chronic diastolic congestive heart failure (H)  (primary encounter diagnosis)  Comment: improved volume status, lower bps  BP Readings from Last 3 Encounters:   04/01/19 111/73   04/02/19 (!) 86/56   04/01/19 111/73      Plan: continue bp meds, furosemide dose decreased to 40 mg/day   Continue K+ replacement and follow BMP.   LE compression wraps    (I48.2) Chronic atrial fibrillation (H)  Comment:  CHADS-VASc score 5  Pulse Readings from Last 4 Encounters:   04/01/19 128   04/02/19 74   04/01/19 128   03/29/19 129      Plan: continue metoprolol and diltiazem for VR control; warfarin per INR for stroke prophylaxis    (C90.00) Multiple myeloma not having achieved remission (H)  Comment: followed by Dr Roberts     Plan: CTX regimen as above, follow up appointment scheduled for next week.       (S22.000G)  Closed compression fracture of thoracic vertebra with delayed healing, subsequent encounter  (G89.29) Other chronic pain  Comment: bone lesions secondary to multiple myeloma  Plan: prn acetaminophen, oxycodone and local measures    (R53.81) Physical deconditioning  Comment: falls, generalized weakness   Plan: PHYSICAL THERAPY / OCCUPATIONAL THERAPY for strengthening, balance, gait, ADLs.     Formal cognitive testing per OCCUPATIONAL THERAPY    Discharge goal is return home with family support      Edith Ash MD       Sincerely,        Edith Ash MD

## 2019-04-02 NOTE — TELEPHONE ENCOUNTER
----- Message from DAYSI Adair CNP sent at 4/2/2019 10:21 AM CDT -----  Regarding: call trisha, med adjustmnents, labs prior to visit  Hi,    Patient seen for CORE enrollment. Blood pressure low which wasn't recorded until after patient left. Trisha sent no paper work with for visit.     Can you please call Trisha :  1. Get BP and weight readings from Trisha  2. DECREASE furosemide to 40mg daily  3. Fax lab order for BMP prior to CORE visit next week  4. Please call and check on weight/BP/symptoms Friday.     Thanks,  Elisabeth

## 2019-04-02 NOTE — PROGRESS NOTES
Cardiology Clinic Progress Note  Amira Arreola MRN# [id number]   YOB: 1932 Age: 86 year old   Primary Cardiologist: Dr. Rivera Reason for visit: CORE enrollment            Assessment and Plan:   Amira Arreola is a very pleasant 86 year old female with a history of HFpEF, chronic atrial fibrillation, hypertension, and hx of multiple myeloma mets to bone (dx 2016, currently being treated with Daratumab, Velcade, and Dexamethasone every 2 weeks). Hospitalized 3/22/19-3/28/19 presented with dyspnea, leg swelling and intermittent palpitations. Patient was found to have atrial fibrillation with RVR and acute diastolic heart failure exacerbation. Patient here today for CORE enrollment post hospitalization.     The mainstays of therapy for HFpEF include volume management, blood pressure control, treating underlying sleep apnea if present, treatment of underlying CAD if within the goals of care, weight management, exercise training, rate control for atrial fibrillation as well as consideration for a rhythm control strategy, as well as consideration for clinical trials.       1.  Chronic diastolic heart failure/HFpEF - Echocardiogram completed 3/23/19 LVEF 55-60%, no wall motion abnormalities, moderate mitral regurgitation, moderate tricuspid regurgitation, and severe pulmonary hypertension. Discharge weight 135#, weight today 129#. Patient appears compensated and hypovolemic. Kidney function stable but slight bump note creatinine 0.89 with BUN 22, baseline creatinine 0.6-0.7. Blood pressure 86/56 and HR 74 in clinic today. Bump in creatinine and low blood pressure concerning for over diuresis, will decrease diuretic regimen today.    - NYHA class III, stage C   - Fluid status : hypovolemic   - Diuretic regimen : DECREASE furosemide to 40mg daily   - Aldosterone antagonist : will consider in the future if worsening symptoms, currently no room in blood pressure.    - Blood pressure : hypotensive, likely  secondary to over diuresis   - Start bilateral lower extremity compression wraps, patient notes she was initially using after discharge but notes none recently.    - Bilateral pleural effusion noted on CXR while hospitalized, currently with no oxygen requirements or complaints of dyspnea but activity has been limited. If complaints of dyspnea noted, consider repeat CXR and thoracentesis, on exam today bilateral lung bases still diminished likely secondary to pleural effusions.    - HF education given, provided with written education materials.     2. Chronic atrial fibrillation - stable, asymptomatic, rate controlled, during hospitalization metoprolol succinate was increased to 150mg BID and started on diltiazem XR 120mg daily to achieve better rate control. This appears to be managing rate control well, only concern today is hypotension, which could be secondary to overdiuresis. Will decrease furosemide and monitor closely. If hypotension persists will likely need to decrease her rate controlling medications slighlty.    - [id number] score 5 (HTN, HF, age, female)   - Continue warfarin for thromboembolic prophylaxis.     3. Hypertension - hypotensive today (asymptomatic), likely secondary to overdiuresis but also could be secondary to rate control medications which were adjusted during recent hospitalization.     4. Moderate mitral regurgitation, moderate tricuspid regurgitation   - Will consider repeat echocardiogram now that patient is euvolemic.      5. Severe pulmonary hypertension - likely secondary to HFpEF and likely improved with diuresis.    - Will consider repeat echocardiogram now that patient is euvolemic.     5. Multiple myeloma with mets to bone - follows with Dr. Roberts, currently being treated with Daratumab, Velcade, and Dexamethasone every 2 weeks   - Patient reports next treatment scheduled for 4/11/19        Changes today: DECREASE furosemide to 40mg daily    Follow up plan:     CORE RN to call  and get recent weights/blood pressures.    CORE followup with Elisabeth in 1 week    CORE RN to call [facility] on Friday to check on weight/BP/symptoms.         History of Presenting Illness:    Amira Arreola is a very pleasant 86 year old female with a history of HFpEF, chronic atrial fibrillation, hypertension, and hx of multiple myeloma mets to bone (dx 2016, currently being treated with Daratumab, Velcade, and Dexamethasone every 2 weeks).     Hospitalized 3/22/19-3/28/19 presented with dyspnea, leg swelling and intermittent palpitations. Patient was found to have atrial fibrillation with RVR and acute diastolic heart failure exacerbation. BNP elevated at 4828, CXR with bilateral moderate effusions with infiltrates vs. Atelectasis. Echocardiogram completed 3/23/19 LVEF 55-60%, no wall motion abnormalities, moderate mitral regurgitation, moderate tricuspid regurgitation, and severe pulmonary hypertension. Oral diltiazem started and metoprolol succinate escalated while hospitalized to help with rate control. Diuresed with IV furosemide and discharged on PO furosemide 40mg BID. Weight 3/24/19 147# (doesn't appear admission weight was completed). Discharge weight 135#. Discharged to TCU.     Patient is here today for CORE enrollment. Patients  and son present during visit.     Patient reports feeling good. Currently at TCU. Monitoring weights [facility] at [facility]. No paperwork sent with patient for visit. No weights or medications available for review during clinic visit. Reports improvement in lower extremity edema, still noting some edema in feet/ankles. Complaints of abdominal bloating, which she states has not been improving. Reports normal bowel movements. Denies shortness of breath at rest. Notes her activity has been limited, but denies shortness of breath with current levels of activity. Sleeping good. Denies orthopnea or PND. Sleeping with HOB slightly elevated. Feels some improvement in energy.      Patient denies chest pain or chest tightness. Denies dizziness, lightheadedness or other presyncopal symptoms. Denies tachycardia or palpitations. Next cycle of chemo therapy 4/11/19. Prior to hospitalization was living at home independently with her .     Labs from yesterday show stable kidney function, slight bump in 0.89 with BUN 22, baseline creatinine 0.6-0.7. Blood pressure 86/56 and HR 74 in clinic today. Bump in creatinine and low blood pressure concerning for over diuresis, will decrease diuretic regimen today.     Appetite is good. Not adding salt to foods. Working with PT/OT. No tobacco or alcohol use.         Recent Hospitalizations   3/22/19-3/28/19 presented with dyspnea, leg swelling and intermittent palpitations. Patient was found to have atrial fibrillation with RVR and acute diastolic heart failure exacerbation. Weight 3/24/19 147# (doesn't appear admission weight was completed). Discharge weight 135#.        Social History    , 6 children, Enjoys keeping the house clean and orderly. Retired nurse.   Social History     Socioeconomic History     Marital status:      Spouse name: Tom     Number of children: 6     Years of education: Not on file     Highest education level: Not on file   Occupational History     Occupation: rn anesthetist     Employer: RETIRED   Social Needs     Financial resource strain: Not on file     Food insecurity:     Worry: Not on file     Inability: Not on file     Transportation needs:     Medical: Not on file     Non-medical: Not on file   Tobacco Use     Smoking status: Never Smoker     Smokeless tobacco: Never Used   Substance and Sexual Activity     Alcohol use: No     Alcohol/week: 0.0 oz     Drug use: No     Sexual activity: No   Lifestyle     Physical activity:     Days per week: Not on file     Minutes per session: Not on file     Stress: Not on file   Relationships     Social connections:     Talks on phone: Not on file     Gets together:  "Not on file     Attends Hinduism service: Not on file     Active member of club or organization: Not on file     Attends meetings of clubs or organizations: Not on file     Relationship status: Not on file     Intimate partner violence:     Fear of current or ex partner: Not on file     Emotionally abused: Not on file     Physically abused: Not on file     Forced sexual activity: Not on file   Other Topics Concern     Parent/sibling w/ CABG, MI or angioplasty before 65F 55M? Not Asked   Social History Narrative     Not on file            Review of Systems:   Skin:  Positive for bruising   Eyes:  Positive for glasses  ENT:  Negative    Respiratory:  Negative    Cardiovascular:    Positive for;palpitations;lightheadedness;edema  Gastroenterology: Negative    Genitourinary:  not assessed    Musculoskeletal:  Positive for arthritis  Neurologic:  Negative    Psychiatric:  Positive for anxiety  Heme/Lymph/Imm:  Positive for    Endocrine:  Negative           Physical Exam:   Vitals: BP (!) 86/56   Pulse 74   Ht 1.651 m (5' 5\")   Wt 58.5 kg (129 lb)   SpO2 100%   BMI 21.47 kg/m     Wt Readings from Last 4 Encounters:   04/02/19 58.5 kg (129 lb)   04/01/19 61.1 kg (134 lb 12.8 oz)   03/29/19 61.2 kg (135 lb)   03/27/19 61.4 kg (135 lb 4.8 oz)     GEN: well nourished, in no acute distress.  HEENT:  Pupils equal, round. Sclerae nonicteric.   NECK: Supple, no masses appreciated. No JVD appreciated on exam at 90 degrees  C/V:  Irregularly irregular rate and rhythm, no murmur, rub or gallop.   RESP: Respirations are unlabored. Diminished bilaterally in bases.   GI: Abdomen distended, nontender.  EXTREM: Trace to +1 LE edema to bilateral ankles  NEURO: Alert and oriented, cooperative.  SKIN: Warm and dry.        Data:   ECHO 3/23/19  The left ventricle is normal in size.  The visual ejection fraction is estimated at 55-60%.  No regional wall motion abnormalities noted.  There is moderate (2+) mitral regurgitation.  There is " moderate (2+) tricuspid regurgitation.  Severe (>55mmHg) pulmonary hypertension is present.  The rhythm was rapid atrial fibrillation.    LIPID RESULTS:  Lab Results   Component Value Date    CHOL 160 10/12/2016    HDL 76 10/12/2016    LDL 71 10/12/2016    TRIG 66 10/12/2016    CHOLHDLRATIO 2.3 09/21/2015     LIVER ENZYME RESULTS:  Lab Results   Component Value Date    AST 34 03/13/2019    ALT 68 (H) 03/13/2019     CBC RESULTS:  Lab Results   Component Value Date    WBC 9.5 03/24/2019    RBC 3.35 (L) 03/24/2019    HGB 12.2 03/29/2019    HCT 31.5 (L) 03/24/2019    MCV 94 03/24/2019    MCH 31.3 03/24/2019    MCHC 33.3 03/24/2019    RDW 14.7 03/24/2019     03/24/2019     BMP RESULTS:  Lab Results   Component Value Date     04/01/2019    POTASSIUM 3.9 04/01/2019    CHLORIDE 105 04/01/2019    CO2 27 04/01/2019    ANIONGAP 6 04/01/2019    GLC 93 04/01/2019    BUN 22 04/01/2019    CR 0.89 04/01/2019    GFRESTIMATED 58 (L) 04/01/2019    GFRESTBLACK 68 04/01/2019    BRADLEY 8.9 04/01/2019      A1C RESULTS:  No results found for: A1C  INR RESULTS:  Lab Results   Component Value Date    INR 2.73 (H) 03/28/2019    INR 2.55 (H) 03/27/2019            Medications     Current Outpatient Medications   Medication Sig Dispense Refill     Acetaminophen (TYLENOL PO) Take 500 mg by mouth every 6 hours as needed for mild pain or fever       acyclovir (ZOVIRAX) 400 MG tablet Take 400 mg by mouth 2 times daily       Carboxymethylcellulose Sod PF (REFRESH PLUS) 0.5 % SOLN ophthalmic solution 1 drop 4 times daily as needed for dry eyes       Dexamethasone (DECADRON PO) Take by mouth See Admin Instructions Give 20 mg every wed.       diltiazem ER (DILT-XR) 120 MG 24 hr capsule Take 1 capsule (120 mg) by mouth daily 30 capsule 1     furosemide (LASIX) 40 MG tablet Take 1 tablet (40 mg) by mouth 2 times daily 60 tablet 1     latanoprost (XALATAN) 0.005 % ophthalmic solution Place 1 drop into the right eye daily   6      lidocaine-prilocaine (EMLA) cream Apply to port site 1 hour prior to access 30 g 1     metoprolol succinate ER (TOPROL-XL) 50 MG 24 hr tablet Take 3 tablets (150 mg) by mouth 2 times daily 180 tablet 3     Multiple Vitamin (DAILY MULTIVITAMIN PO) Take 1 tablet by mouth daily.       oxyCODONE IR (ROXICODONE) 5 MG tablet Take 1 tablet (5 mg) by mouth every 4 hours as needed for pain maximum 4 tablet(s) per day       polyethylene glycol (MIRALAX/GLYCOLAX) powder Take 17 g by mouth daily as needed        potassium chloride (KLOR-CON) 20 MEQ packet Take 20 mEq by mouth 2 times daily 60 packet 1     prochlorperazine (COMPAZINE) 10 MG tablet Take 1 tablet (10 mg) by mouth every 6 hours as needed for nausea or vomiting 30 tablet 1     SIMBRINZA 1-0.2 % ophthalmic suspension Place 1 drop into the right eye 2 times daily 1 drop AM and PM  2     Sodium Fluoride (SF 5000 PLUS) 1.1 % CREA Apply to affected area 3 times daily       vitamin D3 (VITAMIN D3) 1000 units (25 mcg) tablet Take 1,000 Units by mouth daily       warfarin (COUMADIN) 4 MG tablet Take 4 mg by mouth daily Saturday and Tuesday       warfarin (COUMADIN) 4 MG tablet Take 2 mg by mouth daily Sunday, Monday, Wednesday, Thursday, Friday       Zoledronic Acid (ZOMETA IV) Inject into the vein every 3 months             Past Medical History     Past Medical History:   Diagnosis Date     Abnormal CXR 2018    then ct done and not significant     Ascending aorta dilatation (H) 04/2016    on echo, mild, fu 7/18 4.0, slightly larger     Cancer, metastatic to bone (H)     due to myeloma     Colonic polyp 2008    adenomatous, fu 2013 tics only     Compression fracture 2016    multiple areas of spine     Dry eyes      Elevated MCV 2015    b12 and folic acid nl     HTN (hypertension) 2000    off meds for years     Lung nodule 08/2018    on ct, 4mm, ct done for fu abnl cxr     Menorrhagia 2002    hysteroscopy and d and c done     MGUS (monoclonal gammopathy of unknown  significance)     eval by Dr. Roberts     Multiple myeloma (H) 2016    dx  at [place], bone lesions seen on mri      OAB (overactive bladder)     Dr. Grullon     Osteoporosis     fu done  and stable, went off meds then, fu done ; has had gyn fu and added evista 2013 by gyn     Palpitations     nl echo, mildly dilated asc aorta     Paroxysmal atrial fibrillation (H)     had palp and ziopatch showed it, echo nl lv fxn, mild mr and tr, added coum and toprol, toprol dose raised 16     Sciatica of left side     Dr. Helen Ricardo      SVT (supraventricular tachycardia) (H)     on ziopatch     Thrombocytopenia (H)      Past Surgical History:   Procedure Laterality Date     BONE MARROW BIOPSY, BONE SPECIMEN, NEEDLE/TROCAR N/A 2016    Procedure: BIOPSY BONE MARROW;  Surgeon: Bryan Patel MD;  Location:  GI     CATARACT IOL, RT/LT        SECTION  1965, 1966     COLONOSCOPY  2013    Procedure: COLONOSCOPY;  COLONOSCOPY;  Surgeon: Steffany Rockwell MD;  Location:  GI     EXCISE EXOSTOSIS TIBIA / FIBULA  2014    Procedure: EXCISE EXOSTOSIS TIBIA / FIBULA;  Surgeon: Naila Pichardo MD;  Location:  SD     hysteroscopy and d and c      due to bleeding     left anle replacement       right ankle surgery       Family History   Problem Relation Age of Onset     Heart Disease Father      C.A.D. Mother      Cerebrovascular Disease Brother      Family History Negative Sister      Family History Negative Sister      Family History Negative Brother             Allergies   Blood transfusion related (informational only) and Penicillin [penicillins]        [facility]  Pager: [phone]

## 2019-04-03 NOTE — PROGRESS NOTES
Amira Arreola is a 86 year old female seen April 2, 2019 at [facility] where she was admitted this week after [facility] hospitalization for atrial fib with RVR and CHFpEF.     She presented with dyspnea, LE edema and palpitations, was started on diltiazem and metoprolol dose increased to improve VR control; has had lower bps since then with variable HRs.        Patient is seen in her room, up to WC.   Has some difficulty hearing and understanding questions.  Remembers that she went to Cardiology clinic but cannot remember if that was this morning or yesterday.    Denies current CP, dyspnea or palpitations.   By chart review, furosemide dose decreased today at CORE clinic appointment secondary to hypotension.    She has been working with therapies, low activity tolerance.     Pt also has multiple myeloma with bone metastasis, diagnosed in 2016 by BM biopsy    She follows with Dr Roberts and is treated with daratumumab, Velcade, and dexamethasone every 2 weeks, but on hold since hospitalization.   Receives Zometa every 3 months.  She has had recent weakness and falls, has a thoracic compression fracture with chronic back pain.       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 Roberts     Multiple myeloma (H) 2016    dx 5/16 at [place], bone lesions seen on mri 6/16     OAB (overactive bladder) 2013    Dr. Grullon      Osteoporosis     fu done  and stable, went off meds then, fu done ; has had gyn fu and added evista  by gyn     Palpitations     nl echo, mildly dilated asc aorta     Paroxysmal atrial fibrillation (H)     had palp and ziopatch showed it, echo nl lv fxn, mild mr and tr, added coum and toprol, toprol dose raised 16     Sciatica of left side     Dr. Helen Ricardo      SVT (supraventricular tachycardia) (H)     on ziopatch     Thrombocytopenia (H)        Past Surgical History:   Procedure Laterality Date     BONE MARROW BIOPSY, BONE SPECIMEN, NEEDLE/TROCAR N/A 2016    Procedure: BIOPSY BONE MARROW;  Surgeon: Bryan Patel MD;  Location:  GI     CATARACT IOL, RT/LT        SECTION  ,      COLONOSCOPY  2013    Procedure: COLONOSCOPY;  COLONOSCOPY;  Surgeon: Steffany Rockwell MD;  Location:  GI     EXCISE EXOSTOSIS TIBIA / FIBULA  2014    Procedure: EXCISE EXOSTOSIS TIBIA / FIBULA;  Surgeon: Naila Pichardo MD;  Location:  SD     hysteroscopy and d and c      due to bleeding     left anle replacement       right ankle surgery         Family History   Problem Relation Age of Onset     Heart Disease Father      C.A.D. Mother      Cerebrovascular Disease Brother      Family History Negative Sister      Family History Negative Sister      Family History Negative Brother      Social History     Tobacco Use     Smoking status: Never Smoker     Smokeless tobacco: Never Used   Substance Use Topics     Alcohol use: No     Alcohol/week: 0.0 oz      SH:  Lives with her  and son, house in [place].   She has 6 children.    Retired nurse    Review Of Systems  Skin: negative   Eyes: impaired vision  Ears/Nose/Throat: hearing loss  Respiratory: + dyspnea on exertion  Cardiovascular: as above  Gastrointestinal: negative  Genitourinary: negative  Musculoskeletal: generalized weakness, assist with transfers  and ambulation      Neurologic: MoCA 13/25     Psychiatric: negative  Hematologic/Lymphatic/Immunologic: anemia  Endocrine: negative      GENERAL APPEARANCE: alert and no distress, frail  /73   Pulse 128   Temp 97.3  F (36.3  C)   Resp 12   Wt 67.1 kg (148 lb)   SpO2 97%   BMI 24.63 kg/m     HEENT: normocephalic, no lesion or abnormalities  NECK: no adenopathy, no asymmetry, masses, or scars and thyroid normal to palpation  RESP: decreased BS, no rales or wheeze  CV: irregular rate and rhythm, normal S1 S2, 1/6 HSM  ABDOMEN:  soft, nontender, no HSM or masses and bowel sounds normal  MS: +kyphosis, 1+ LE edema  SKIN: no suspicious lesions or rashes  NEURO: generalized weakness, limited history    No tremor or stiffness.      PSYCH: affect okay  LYMPHATICS: No cervical,  or supraclavicular nodes    Last Comprehensive Metabolic Panel:  Sodium   Date Value Ref Range Status   04/01/2019 138 133 - 144 mmol/L Final     Potassium   Date Value Ref Range Status   04/01/2019 3.9 3.4 - 5.3 mmol/L Final     Chloride   Date Value Ref Range Status   04/01/2019 105 94 - 109 mmol/L Final     Carbon Dioxide   Date Value Ref Range Status   04/01/2019 27 20 - 32 mmol/L Final     Anion Gap   Date Value Ref Range Status   04/01/2019 6 3 - 14 mmol/L Final     Glucose   Date Value Ref Range Status   04/01/2019 93 70 - 99 mg/dL Final     Urea Nitrogen   Date Value Ref Range Status   04/01/2019 22 7 - 30 mg/dL Final     Creatinine   Date Value Ref Range Status   04/01/2019 0.89 0.52 - 1.04 mg/dL Final     GFR Estimate   Date Value Ref Range Status   04/01/2019 58 (L) >60 mL/min/[1.73_m2] Final     Comment:     Non  GFR Calc  Starting 12/18/2018, serum creatinine based estimated GFR (eGFR) will be   calculated using the Chronic Kidney Disease Epidemiology Collaboration   (CKD-EPI) equation.       Calcium   Date Value Ref Range Status   04/01/2019 8.9 8.5 - 10.1 mg/dL Final     Lab Results   Component Value Date     WBC 9.5 03/24/2019      HGB 12.2 03/29/2019      MCV 94 03/24/2019       03/24/2019     TSH   Date Value Ref Range Status   03/22/2019 2.31 0.40 - 4.00 mU/L Final      XR CHEST TWO VIEWS   3/22/2019 9:42 PM    IMPRESSION: Lungs are hypoinflated. Medium-sized bilateral pleural  effusions are present, worsened compared to 9/19/2018. Superimposed  bibasilar opacities are present, presumably atelectasis. Underlying  infection, aspiration, or malignancy cannot be excluded. Right chest  port catheter tip remains within the right atrium. Heart size is not  appreciably changed. Mid thoracic compression fracture is unchanged.      ECHO 3/22/19 Interpretation Summary  The left ventricle is normal in size.  The visual ejection fraction is estimated at 55-60%.  No regional wall motion abnormalities noted.  There is moderate (2+) mitral regurgitation.  There is moderate (2+) tricuspid regurgitation.  Severe (>55mmHg) pulmonary hypertension is present.  The rhythm was rapid atrial fibrillation.    IMP/PLAN:    (I50.33) Acute on chronic diastolic congestive heart failure (H)  (primary encounter diagnosis)  Comment: improved volume status, lower bps  BP Readings from Last 3 Encounters:   04/01/19 111/73   04/02/19 (!) 86/56   04/01/19 111/73      Plan: continue bp meds, furosemide dose decreased to 40 mg/day   Continue K+ replacement and follow BMP.   LE compression wraps    (I48.2) Chronic atrial fibrillation (H)  Comment:  CHADS-VASc score 5  Pulse Readings from Last 4 Encounters:   04/01/19 128   04/02/19 74   04/01/19 128   03/29/19 129      Plan: continue metoprolol and diltiazem for VR control; warfarin per INR for stroke prophylaxis    (C90.00) Multiple myeloma not having achieved remission (H)  Comment: followed by Dr Roberts     Plan: CTX regimen as above, follow up appointment scheduled for next week.       (S22.000G) Closed compression fracture of thoracic vertebra with delayed healing, subsequent  encounter  (G89.29) Other chronic pain  Comment: bone lesions secondary to multiple myeloma  Plan: prn acetaminophen, oxycodone and local measures    (R53.81) Physical deconditioning  Comment: falls, generalized weakness   Plan: PHYSICAL THERAPY / OCCUPATIONAL THERAPY for strengthening, balance, gait, ADLs.     Formal cognitive testing per OCCUPATIONAL THERAPY    Discharge goal is return home with family support      Edith Ash MD

## 2019-04-04 ENCOUNTER — NURSING HOME VISIT (OUTPATIENT)
Dept: GERIATRICS | Facility: CLINIC | Age: 84
End: 2019-04-04
Payer: MEDICARE

## 2019-04-04 VITALS
TEMPERATURE: 97.8 F | RESPIRATION RATE: 16 BRPM | HEIGHT: 65 IN | BODY MASS INDEX: 21.02 KG/M2 | WEIGHT: 126.2 LBS | DIASTOLIC BLOOD PRESSURE: 60 MMHG | HEART RATE: 129 BPM | OXYGEN SATURATION: 94 % | SYSTOLIC BLOOD PRESSURE: 100 MMHG

## 2019-04-04 DIAGNOSIS — Z51.81 ENCOUNTER FOR THERAPEUTIC DRUG MONITORING: ICD-10-CM

## 2019-04-04 DIAGNOSIS — Z79.01 LONG TERM (CURRENT) USE OF ANTICOAGULANTS: ICD-10-CM

## 2019-04-04 DIAGNOSIS — I48.0 PAROXYSMAL ATRIAL FIBRILLATION (H): Primary | ICD-10-CM

## 2019-04-04 PROCEDURE — 99308 SBSQ NF CARE LOW MDM 20: CPT | Performed by: NURSE PRACTITIONER

## 2019-04-04 ASSESSMENT — MIFFLIN-ST. JEOR: SCORE: 1013.32

## 2019-04-04 NOTE — PROGRESS NOTES
"[place] [facility]  [place] Medical Record Number:  [id number]  Place of Service where encounter took place: [facility] ([facility]) [[id number]]    HPI:    Amira Arreola is a 86 year old  (7/17/1932), who is being seen today for an episodic care visit at the above location.   HPI information obtained from: facility chart records, facility staff, patient report and [facility] chart review. Today's concern is INR/Coumadin management for A. Fib    ROS/Subjective:  Bleeding Signs/Symptoms:  None  Thromboembolic Signs/Symptoms:  None  Medication Changes:  No  Dietary Changes:  No  Activity Changes: Yes  Bacterial/Viral Infection:  No  Missed Coumadin Doses:  None  On ASA: No  Other Concerns:  No    OBJECTIVE:  /60   Pulse 129   Temp 97.8  F (36.6  C)   Resp 16   Ht 1.651 m (5' 5\")   Wt 57.2 kg (126 lb 3.2 oz)   SpO2 94%   BMI 21.00 kg/m    Last INR: 2.3 on 4/1  INR Today:  2.3  Current Dose:  4mg T Sat and 2mg ROW      ASSESSMENT:     Paroxysmal atrial fibrillation (H)  Encounter for therapeutic drug monitoring  Long term (current) use of anticoagulants  Therapeutic INR for goal of 2-3    PLAN:     New Dose: No Change    Next INR: 4/11/19      Electronically signed by:  DAYSI Marquez CNP       "

## 2019-04-05 ENCOUNTER — NURSING HOME VISIT (OUTPATIENT)
Dept: GERIATRICS | Facility: CLINIC | Age: 84
End: 2019-04-05
Payer: MEDICARE

## 2019-04-05 VITALS
BODY MASS INDEX: 21.02 KG/M2 | SYSTOLIC BLOOD PRESSURE: 132 MMHG | TEMPERATURE: 96.8 F | HEART RATE: 71 BPM | WEIGHT: 126.2 LBS | OXYGEN SATURATION: 99 % | RESPIRATION RATE: 16 BRPM | HEIGHT: 65 IN | DIASTOLIC BLOOD PRESSURE: 81 MMHG

## 2019-04-05 DIAGNOSIS — I48.0 PAROXYSMAL ATRIAL FIBRILLATION (H): ICD-10-CM

## 2019-04-05 DIAGNOSIS — C90.00 MULTIPLE MYELOMA NOT HAVING ACHIEVED REMISSION (H): ICD-10-CM

## 2019-04-05 DIAGNOSIS — R53.81 PHYSICAL DECONDITIONING: ICD-10-CM

## 2019-04-05 DIAGNOSIS — S22.000G CLOSED COMPRESSION FRACTURE OF THORACIC VERTEBRA WITH DELAYED HEALING, SUBSEQUENT ENCOUNTER: ICD-10-CM

## 2019-04-05 DIAGNOSIS — I50.32 CHRONIC DIASTOLIC HEART FAILURE (H): Primary | ICD-10-CM

## 2019-04-05 DIAGNOSIS — I10 BENIGN ESSENTIAL HYPERTENSION: ICD-10-CM

## 2019-04-05 PROBLEM — I50.9 CHF EXACERBATION (H): Status: RESOLVED | Noted: 2019-03-22 | Resolved: 2019-04-05

## 2019-04-05 PROCEDURE — 99309 SBSQ NF CARE MODERATE MDM 30: CPT | Performed by: NURSE PRACTITIONER

## 2019-04-05 ASSESSMENT — MIFFLIN-ST. JEOR: SCORE: 1013.32

## 2019-04-05 NOTE — LETTER
4/5/2019        RE: Amira Arreola  [address]  [facility] [address]        [place] [facility]  [place] Medical Record Number:  [id number]  Place of Service where encounter took place:  [facility] ([facility]) [[id number]]  Chief Complaint   Patient presents with     RECHECK       HPI:    Amira Arreola  is a 86 year old (7/17/1932), who is being seen today for an episodic care visit.  HPI information obtained from: facility chart records, facility staff, patient report and [facility] chart review. Today's concern is:   86 year old female with h/o Multiple Myeloma mets to bone (sees Dr Roberts and chemo held until out of TCU), chronic AF, HTN, multiple prior Orthopedic surgeries,  hospitalized with dyspnea, leg swelling, and intermittent palpitations and is found to have afib with RVR and acute dHF. Treated with lasix, diltiazem, lopressor and coumadin, KCL replacement and to f/u with CORE. On oxy PRN back pain (thoracic compression fx) and BID acyclovir for shingles prophylaxis. Now at TCU.  Since in TCU she has been working with therapies. She has no shortness of breath and reports no pain now but she has been taking an occasional oxycodone. . She would like to return home soon.       Past Medical and Surgical History reviewed in Epic today.    MEDICATIONS:  Current Outpatient Medications   Medication Sig Dispense Refill     Acetaminophen (TYLENOL PO) Take 500 mg by mouth every 6 hours as needed for mild pain or fever       acyclovir (ZOVIRAX) 400 MG tablet Take 400 mg by mouth 2 times daily       Carboxymethylcellulose Sod PF (REFRESH PLUS) 0.5 % SOLN ophthalmic solution 1 drop 4 times daily as needed for dry eyes       Dexamethasone (DECADRON PO) Take by mouth See Admin Instructions Give 20 mg every wed.       diltiazem ER (DILT-XR) 120 MG 24 hr capsule Take 1 capsule (120 mg) by mouth daily 30 capsule 1     furosemide (LASIX) 40 MG tablet Take 1 tablet (40 mg) by mouth  "daily 60 tablet 1     latanoprost (XALATAN) 0.005 % ophthalmic solution Place 1 drop into the right eye daily   6     lidocaine-prilocaine (EMLA) cream Apply to port site 1 hour prior to access 30 g 1     metoprolol succinate ER (TOPROL-XL) 50 MG 24 hr tablet Take 3 tablets (150 mg) by mouth 2 times daily 180 tablet 3     Multiple Vitamin (DAILY MULTIVITAMIN PO) Take 1 tablet by mouth daily.       oxyCODONE IR (ROXICODONE) 5 MG tablet Take 1 tablet (5 mg) by mouth every 4 hours as needed for pain maximum 4 tablet(s) per day       polyethylene glycol (MIRALAX/GLYCOLAX) powder Take 17 g by mouth daily as needed        potassium chloride (KLOR-CON) 20 MEQ packet Take 20 mEq by mouth 2 times daily 60 packet 1     prochlorperazine (COMPAZINE) 10 MG tablet Take 1 tablet (10 mg) by mouth every 6 hours as needed for nausea or vomiting 30 tablet 1     SIMBRINZA 1-0.2 % ophthalmic suspension Place 1 drop into the right eye 2 times daily 1 drop AM and PM  2     Sodium Fluoride (SF 5000 PLUS) 1.1 % CREA Apply to affected area 3 times daily       vitamin D3 (VITAMIN D3) 1000 units (25 mcg) tablet Take 1,000 Units by mouth daily       warfarin (COUMADIN) 4 MG tablet Take 4 mg by mouth daily Saturday and Tuesday       warfarin (COUMADIN) 4 MG tablet Take 2 mg by mouth daily Sunday, Monday, Wednesday, Thursday, Friday           REVIEW OF SYSTEMS:  4 point ROS including Respiratory, CV, GI and , other than that noted in the HPI,  is negative    Objective:  /81   Pulse 71   Temp 96.8  F (36  C)   Resp 16   Ht 1.651 m (5' 5\")   Wt 57.2 kg (126 lb 3.2 oz)   SpO2 99%   BMI 21.00 kg/m     Exam:  GENERAL APPEARANCE:  Alert, in no distress  EYES:  EOM, conjunctivae, lids, pupils and irises normal  RESP:  respiratory effort and palpation of chest normal, lungs clear to auscultation   CV:  Palpation and auscultation of heart done , irregular rate and rhythm, no murmur, rub, or gallop  ABDOMEN:  bowel sounds normal, soft, " non-tender  M/S:   Gait and station abnormal slow gait. kyphotic spine.   Digits and nails normal  SKIN:  Inspection of skin and subcutaneous tissue baseline, Palpation of skin and subcutaneous tissue baseline  NEURO:   Cranial nerves 2-12 are normal tested and grossly at patient's baseline, Examination of sensation by touch normal  PSYCH:  insight and judgement impaired, memory impaired     Labs:   [facility]. Please refer to them using EPIC/Care Everywhere. and Recent labs in [facility] reviewed by me today.     ASSESSMENT/PLAN:  (I50.32) Chronic diastolic heart failure (H)  (primary encounter diagnosis)  Comment: patient is TCU following hospitalization due to HF. She was enrolled in CORE clinic and went to first appointment on 4/2. Wt was down and BP was low at this appointment so lasix was reduced to 40mg q day.   Plan: daily wts.   Monitor BP   Labs at next core appointment on 4/11    (I48.0) Paroxysmal atrial fibrillation (H)  Comment: CHADS VASC 5. During last hospitalization metop was increased 150mg BID and started on diltiazem XR 120mg. HR in TCU . She is anticoagulated on warfarin.   Plan: continue on current plan    (I10) Benign essential hypertension  Comment: last three BPs 132/81, 116/68, 119/73.   Plan: monitor BPs    (C90.00) Multiple myeloma not having achieved remission (H)  Comment: follows with DR Roberts. chemo on hold for now.   Plan: follow up with Dr Roberts.     (S22.000G) Closed compression fracture of thoracic vertebra with delayed healing, subsequent encounter  Comment: she does report occasional pain. We did reduce oxycodone upon adm to TCU due to sedation.   Plan: continue prn oxycodone 5mg q 4 h prn    (R53.81) Physical deconditioning  Comment: due to HF and MM. She is quite frail. She lives with frail  in home.   Plan: physical therapy and OCCUPATIONAL THERAPY. SW to assist with discharge planning        Electronically signed by:  Autumn Ortega,  APRN CNP                 Sincerely,        DAYSI Marquez CNP

## 2019-04-05 NOTE — PROGRESS NOTES
"Called to [facility] to check on weight/BP/symptoms Friday post DECREASE furosemide to 40mg daily at last office visit with Elisabeth Means NP on 4/2/2019 - left message to call back with update.  Order for BMP was faxed 4/2 to be drawn prior to CORE visit next week.  Sammie Auguste RN 04/05/19 10:39 AM      Call back from Caitie Nurse  With [facility] - reports blood pressure 132/81 today - this is highest this week as SBP's have been in low 100's this week; pulse today 71 bpm; weight today 126.2 lbs which is down several lbs 4/2 133.2 llbs. Nurse reports \"patient feels great\" without edema, shortness of breath, orthopnea, abdominal distension, lightheadedness or dizziness. Plan for lab draw next week.   Message CORE board to check for labs next week prior to visit.   Visit w/Elisabeth Means NP on 4/9/2019  Update to TONE.  Sammie Auguste RN 04/05/19 11:11 AM              "

## 2019-04-05 NOTE — PROGRESS NOTES
[place] [facility]  [place] Medical Record Number:  [id number]  Place of Service where encounter took place:  [facility] ([facility]) [[id number]]  Chief Complaint   Patient presents with     RECHECK       HPI:    Amira Arreola  is a 86 year old (7/17/1932), who is being seen today for an episodic care visit.  HPI information obtained from: facility chart records, facility staff, patient report and [facility] chart review. Today's concern is:   86 year old female with h/o Multiple Myeloma mets to bone (sees Dr Roberts and chemo held until out of TCU), chronic AF, HTN, multiple prior Orthopedic surgeries,  hospitalized with dyspnea, leg swelling, and intermittent palpitations and is found to have afib with RVR and acute dHF. Treated with lasix, diltiazem, lopressor and coumadin, KCL replacement and to f/u with CORE. On oxy PRN back pain (thoracic compression fx) and BID acyclovir for shingles prophylaxis. Now at TCU.  Since in TCU she has been working with therapies. She has no shortness of breath and reports no pain now but she has been taking an occasional oxycodone. . She would like to return home soon.       Past Medical and Surgical History reviewed in Epic today.    MEDICATIONS:  Current Outpatient Medications   Medication Sig Dispense Refill     Acetaminophen (TYLENOL PO) Take 500 mg by mouth every 6 hours as needed for mild pain or fever       acyclovir (ZOVIRAX) 400 MG tablet Take 400 mg by mouth 2 times daily       Carboxymethylcellulose Sod PF (REFRESH PLUS) 0.5 % SOLN ophthalmic solution 1 drop 4 times daily as needed for dry eyes       Dexamethasone (DECADRON PO) Take by mouth See Admin Instructions Give 20 mg every wed.       diltiazem ER (DILT-XR) 120 MG 24 hr capsule Take 1 capsule (120 mg) by mouth daily 30 capsule 1     furosemide (LASIX) 40 MG tablet Take 1 tablet (40 mg) by mouth daily 60 tablet 1     latanoprost (XALATAN) 0.005 % ophthalmic solution Place 1 drop into the  "right eye daily   6     lidocaine-prilocaine (EMLA) cream Apply to port site 1 hour prior to access 30 g 1     metoprolol succinate ER (TOPROL-XL) 50 MG 24 hr tablet Take 3 tablets (150 mg) by mouth 2 times daily 180 tablet 3     Multiple Vitamin (DAILY MULTIVITAMIN PO) Take 1 tablet by mouth daily.       oxyCODONE IR (ROXICODONE) 5 MG tablet Take 1 tablet (5 mg) by mouth every 4 hours as needed for pain maximum 4 tablet(s) per day       polyethylene glycol (MIRALAX/GLYCOLAX) powder Take 17 g by mouth daily as needed        potassium chloride (KLOR-CON) 20 MEQ packet Take 20 mEq by mouth 2 times daily 60 packet 1     prochlorperazine (COMPAZINE) 10 MG tablet Take 1 tablet (10 mg) by mouth every 6 hours as needed for nausea or vomiting 30 tablet 1     SIMBRINZA 1-0.2 % ophthalmic suspension Place 1 drop into the right eye 2 times daily 1 drop AM and PM  2     Sodium Fluoride (SF 5000 PLUS) 1.1 % CREA Apply to affected area 3 times daily       vitamin D3 (VITAMIN D3) 1000 units (25 mcg) tablet Take 1,000 Units by mouth daily       warfarin (COUMADIN) 4 MG tablet Take 4 mg by mouth daily Saturday and Tuesday       warfarin (COUMADIN) 4 MG tablet Take 2 mg by mouth daily Sunday, Monday, Wednesday, Thursday, Friday           REVIEW OF SYSTEMS:  4 point ROS including Respiratory, CV, GI and , other than that noted in the HPI,  is negative    Objective:  /81   Pulse 71   Temp 96.8  F (36  C)   Resp 16   Ht 1.651 m (5' 5\")   Wt 57.2 kg (126 lb 3.2 oz)   SpO2 99%   BMI 21.00 kg/m    Exam:  GENERAL APPEARANCE:  Alert, in no distress  EYES:  EOM, conjunctivae, lids, pupils and irises normal  RESP:  respiratory effort and palpation of chest normal, lungs clear to auscultation   CV:  Palpation and auscultation of heart done , irregular rate and rhythm, no murmur, rub, or gallop  ABDOMEN:  bowel sounds normal, soft, non-tender  M/S:   Gait and station abnormal slow gait. kyphotic spine.   Digits and nails " normal  SKIN:  Inspection of skin and subcutaneous tissue baseline, Palpation of skin and subcutaneous tissue baseline  NEURO:   Cranial nerves 2-12 are normal tested and grossly at patient's baseline, Examination of sensation by touch normal  PSYCH:  insight and judgement impaired, memory impaired     Labs:   Labs done in SNF are in [facility]. Please refer to them using EPIC/Care Everywhere. and Recent labs in EPIC reviewed by me today.     ASSESSMENT/PLAN:  (I50.32) Chronic diastolic heart failure (H)  (primary encounter diagnosis)  Comment: patient is TCU following hospitalization due to HF. She was enrolled in CORE clinic and went to first appointment on 4/2. Wt was down and BP was low at this appointment so lasix was reduced to 40mg q day.   Plan: daily wts.   Monitor BP   Labs at next core appointment on 4/11    (I48.0) Paroxysmal atrial fibrillation (H)  Comment: CHADS VASC 5. During last hospitalization metop was increased 150mg BID and started on diltiazem XR 120mg. HR in TCU . She is anticoagulated on warfarin.   Plan: continue on current plan    (I10) Benign essential hypertension  Comment: last three BPs 132/81, 116/68, 119/73.   Plan: monitor BPs    (C90.00) Multiple myeloma not having achieved remission (H)  Comment: follows with DR Roberts. chemo on hold for now.   Plan: follow up with Dr Roberts.     (S22.000G) Closed compression fracture of thoracic vertebra with delayed healing, subsequent encounter  Comment: she does report occasional pain. We did reduce oxycodone upon adm to TCU due to sedation.   Plan: continue prn oxycodone 5mg q 4 h prn    (R53.81) Physical deconditioning  Comment: due to HF and MM. She is quite frail. She lives with frail  in home.   Plan: physical therapy and OCCUPATIONAL THERAPY. SW to assist with discharge planning        Electronically signed by:  DAYSI Marquez CNP

## 2019-04-08 ENCOUNTER — HOSPITAL LABORATORY (OUTPATIENT)
Dept: OTHER | Facility: CLINIC | Age: 84
End: 2019-04-08

## 2019-04-08 LAB
ANION GAP SERPL CALCULATED.3IONS-SCNC: 7 MMOL/L (ref 3–14)
BUN SERPL-MCNC: 19 MG/DL (ref 7–30)
CALCIUM SERPL-MCNC: 8.9 MG/DL (ref 8.5–10.1)
CHLORIDE SERPL-SCNC: 106 MMOL/L (ref 94–109)
CO2 SERPL-SCNC: 27 MMOL/L (ref 20–32)
CREAT SERPL-MCNC: 0.73 MG/DL (ref 0.52–1.04)
GFR SERPL CREATININE-BSD FRML MDRD: 75 ML/MIN/{1.73_M2}
GLUCOSE SERPL-MCNC: 86 MG/DL (ref 70–99)
POTASSIUM SERPL-SCNC: 3.8 MMOL/L (ref 3.4–5.3)
SODIUM SERPL-SCNC: 140 MMOL/L (ref 133–144)

## 2019-04-08 RX ORDER — ALBUTEROL SULFATE 90 UG/1
1-2 AEROSOL, METERED RESPIRATORY (INHALATION)
Status: CANCELLED
Start: 2019-05-08

## 2019-04-08 RX ORDER — SODIUM CHLORIDE 9 MG/ML
1000 INJECTION, SOLUTION INTRAVENOUS CONTINUOUS PRN
Status: CANCELLED
Start: 2019-04-24

## 2019-04-08 RX ORDER — METHYLPREDNISOLONE SODIUM SUCCINATE 125 MG/2ML
125 INJECTION, POWDER, LYOPHILIZED, FOR SOLUTION INTRAMUSCULAR; INTRAVENOUS
Status: CANCELLED
Start: 2019-04-24

## 2019-04-08 RX ORDER — EPINEPHRINE 1 MG/ML
0.3 INJECTION, SOLUTION INTRAMUSCULAR; SUBCUTANEOUS EVERY 5 MIN PRN
Status: CANCELLED | OUTPATIENT
Start: 2019-05-08

## 2019-04-08 RX ORDER — EPINEPHRINE 1 MG/ML
0.3 INJECTION, SOLUTION INTRAMUSCULAR; SUBCUTANEOUS EVERY 5 MIN PRN
Status: CANCELLED | OUTPATIENT
Start: 2019-04-24

## 2019-04-08 RX ORDER — ACETAMINOPHEN 325 MG/1
650 TABLET ORAL ONCE
Status: CANCELLED | OUTPATIENT
Start: 2019-04-24

## 2019-04-08 RX ORDER — DIPHENHYDRAMINE HCL 25 MG
50 CAPSULE ORAL ONCE
Status: CANCELLED | OUTPATIENT
Start: 2019-04-24

## 2019-04-08 RX ORDER — DIPHENHYDRAMINE HYDROCHLORIDE 50 MG/ML
50 INJECTION INTRAMUSCULAR; INTRAVENOUS
Status: CANCELLED
Start: 2019-04-24

## 2019-04-08 RX ORDER — ALBUTEROL SULFATE 0.83 MG/ML
2.5 SOLUTION RESPIRATORY (INHALATION)
Status: CANCELLED | OUTPATIENT
Start: 2019-04-24

## 2019-04-08 RX ORDER — EPINEPHRINE 0.3 MG/.3ML
0.3 INJECTION SUBCUTANEOUS EVERY 5 MIN PRN
Status: CANCELLED | OUTPATIENT
Start: 2019-05-08

## 2019-04-08 RX ORDER — EPINEPHRINE 0.3 MG/.3ML
0.3 INJECTION SUBCUTANEOUS EVERY 5 MIN PRN
Status: CANCELLED | OUTPATIENT
Start: 2019-04-24

## 2019-04-08 RX ORDER — METHYLPREDNISOLONE SODIUM SUCCINATE 125 MG/2ML
125 INJECTION, POWDER, LYOPHILIZED, FOR SOLUTION INTRAMUSCULAR; INTRAVENOUS
Status: CANCELLED
Start: 2019-05-08

## 2019-04-08 RX ORDER — ALBUTEROL SULFATE 0.83 MG/ML
2.5 SOLUTION RESPIRATORY (INHALATION)
Status: CANCELLED | OUTPATIENT
Start: 2019-05-08

## 2019-04-08 RX ORDER — LORAZEPAM 2 MG/ML
0.5 INJECTION INTRAMUSCULAR EVERY 4 HOURS PRN
Status: CANCELLED
Start: 2019-04-24

## 2019-04-08 RX ORDER — HEPARIN SODIUM (PORCINE) LOCK FLUSH IV SOLN 100 UNIT/ML 100 UNIT/ML
5 SOLUTION INTRAVENOUS EVERY 8 HOURS
Status: CANCELLED
Start: 2019-05-08

## 2019-04-08 RX ORDER — SODIUM CHLORIDE 9 MG/ML
1000 INJECTION, SOLUTION INTRAVENOUS CONTINUOUS PRN
Status: CANCELLED
Start: 2019-05-08

## 2019-04-08 RX ORDER — MEPERIDINE HYDROCHLORIDE 25 MG/ML
25 INJECTION INTRAMUSCULAR; INTRAVENOUS; SUBCUTANEOUS EVERY 30 MIN PRN
Status: CANCELLED | OUTPATIENT
Start: 2019-05-08

## 2019-04-08 RX ORDER — MEPERIDINE HYDROCHLORIDE 25 MG/ML
25 INJECTION INTRAMUSCULAR; INTRAVENOUS; SUBCUTANEOUS EVERY 30 MIN PRN
Status: CANCELLED | OUTPATIENT
Start: 2019-04-24

## 2019-04-08 RX ORDER — ALBUTEROL SULFATE 90 UG/1
1-2 AEROSOL, METERED RESPIRATORY (INHALATION)
Status: CANCELLED
Start: 2019-04-24

## 2019-04-08 RX ORDER — LORAZEPAM 2 MG/ML
0.5 INJECTION INTRAMUSCULAR EVERY 4 HOURS PRN
Status: CANCELLED
Start: 2019-05-08

## 2019-04-08 RX ORDER — DIPHENHYDRAMINE HYDROCHLORIDE 50 MG/ML
50 INJECTION INTRAMUSCULAR; INTRAVENOUS
Status: CANCELLED
Start: 2019-05-08

## 2019-04-08 RX ORDER — HEPARIN SODIUM (PORCINE) LOCK FLUSH IV SOLN 100 UNIT/ML 100 UNIT/ML
5 SOLUTION INTRAVENOUS EVERY 8 HOURS
Status: CANCELLED
Start: 2019-04-24

## 2019-04-09 ENCOUNTER — DOCUMENTATION ONLY (OUTPATIENT)
Dept: GERIATRICS | Facility: CLINIC | Age: 84
End: 2019-04-09

## 2019-04-09 ENCOUNTER — OFFICE VISIT (OUTPATIENT)
Dept: CARDIOLOGY | Facility: CLINIC | Age: 84
End: 2019-04-09
Attending: NURSE PRACTITIONER
Payer: MEDICARE

## 2019-04-09 VITALS
TEMPERATURE: 97.1 F | WEIGHT: 125.2 LBS | HEART RATE: 70 BPM | SYSTOLIC BLOOD PRESSURE: 99 MMHG | DIASTOLIC BLOOD PRESSURE: 62 MMHG | RESPIRATION RATE: 18 BRPM | BODY MASS INDEX: 20.86 KG/M2 | OXYGEN SATURATION: 100 % | HEIGHT: 65 IN

## 2019-04-09 VITALS
SYSTOLIC BLOOD PRESSURE: 104 MMHG | WEIGHT: 128.8 LBS | BODY MASS INDEX: 21.46 KG/M2 | OXYGEN SATURATION: 97 % | DIASTOLIC BLOOD PRESSURE: 64 MMHG | HEART RATE: 88 BPM | HEIGHT: 65 IN

## 2019-04-09 DIAGNOSIS — I50.32 CHRONIC DIASTOLIC HEART FAILURE (H): Primary | ICD-10-CM

## 2019-04-09 DIAGNOSIS — I10 BENIGN ESSENTIAL HYPERTENSION: ICD-10-CM

## 2019-04-09 DIAGNOSIS — I50.9 ACUTE ON CHRONIC CONGESTIVE HEART FAILURE, UNSPECIFIED HEART FAILURE TYPE (H): ICD-10-CM

## 2019-04-09 DIAGNOSIS — I48.0 PAROXYSMAL ATRIAL FIBRILLATION (H): ICD-10-CM

## 2019-04-09 PROCEDURE — 99214 OFFICE O/P EST MOD 30 MIN: CPT | Performed by: NURSE PRACTITIONER

## 2019-04-09 ASSESSMENT — MIFFLIN-ST. JEOR
SCORE: 1008.78
SCORE: 1025.11

## 2019-04-09 NOTE — PROGRESS NOTES
[place] [facility] DISCHARGE SUMMARY  PATIENT'S NAME: Amira Arreola  YOB: 1932  MEDICAL RECORD NUMBER:  [id number]  Place of Service where encounter took place:  [facility] [[id number]]    PRIMARY CARE PROVIDER AND CLINIC RESPONSIBLE AFTER TRANSFER:   Addy Frias MD, [address] / [place] [address]    Non-[facility] Provider     Transferring providers: DAYSI Marquez CNP, Dr. Mihir MD  Recent Hospitalization/ED:  [facility] Hospital stay 03/22/19 to 03/28/19.  Date of SNF Admission: March / 28 / 2019  Date of SNF (anticipated) Discharge: April / 11 / 2019  Discharged to: with family home  Cognitive Scores: SLUMS: 6/30  Physical Function: Ambulating 250 ft with walker  DME: Walker    CODE STATUS/ADVANCE DIRECTIVES DISCUSSION:  Full Code   ALLERGIES: Blood transfusion related (informational only) and Penicillin [penicillins]    DISCHARGE DIAGNOSIS/NURSING FACILITY COURSE:   (I50.32) Chronic diastolic heart failure (H)  (primary encounter diagnosis)  Comment: patient was in TCU following hospitalization due to dyspnea, LE swelling and intermittent. She was treated for acute diastolic heart failure and AF with RVR. Since in TCU she did follow up with CORE clinic on 4/2 and 4/9. Lasix was reduced to 40mg q day. Her weight has been stable at 125#. She is breathing comfortably and LE is less.   Plan: HOME CARE NURSING REFERRAL        Continue lasix 40mg q day with kcl 20 meq BID.     (I48.0) Paroxysmal atrial fibrillation (H)  Comment: during hospital stay she did require addition of diltiazem and increase in metoprolol due to rapid HR. Since in TCU HR 70-90. She is anticoagulated with warfarin. INR has been stable at 2.3 on PTA dose of warfarin  Plan:continue diltizem ER 120mg q day with metoprolol ER 150mg BID  (Z79.01) Long term current use of anticoagulant therapy  Comment: INR therapeutic  Plan: coumadin 4mg  On Saturday and Tuesday and  2mg ROW.   INR on 4/15            (I10) Benign essential hypertension  Comment: BPs ok on current meds  Plan: monitor            (C90.00) Multiple myeloma not having achieved remission (H)  Comment: follows with DR Roberts.chemo has been on hold in TCU      (S22.000G) Closed compression fracture of thoracic vertebra with delayed healing, subsequent encounter  Comment: occasional back pain. Oxycodone dose was reduced due to sedation. She takes about 5mg q day  Plan:        Oxycodone 5mg q 4h prn    (R53.81) Physical deconditioning  Comment: due to HR and MM. She made progress with therapy in TCU and feels strong enough to return home with home care. Family is setting up additional home care.  Plan: HOME CARE NURSING REFERRAL             Past Medical History:  has a past medical history of Abnormal CXR (2018), Ascending aorta dilatation (H) (04/2016), Cancer, metastatic to bone (H), CHF exacerbation (H) (3/22/2019), Colonic polyp (2008), Compression fracture (2016), Dry eyes, Elevated MCV (2015), HTN (hypertension) (2000), Lung nodule (08/2018), Menorrhagia (2002), MGUS (monoclonal gammopathy of unknown significance) (2015), Multiple myeloma (H) (2016), OAB (overactive bladder) (2013), Osteoporosis, Palpitations (4/16), Paroxysmal atrial fibrillation (H) (4/16), Sciatica of left side (12/13), Shingles (2004), SVT (supraventricular tachycardia) (H) (4/16), and Thrombocytopenia (H) (2014). She also has no past medical history of PONV (postoperative nausea and vomiting).    Discharge Medications:  Current Outpatient Medications   Medication Sig Dispense Refill     Acetaminophen (TYLENOL PO) Take 500 mg by mouth every 6 hours as needed for mild pain or fever       acyclovir (ZOVIRAX) 400 MG tablet Take 400 mg by mouth 2 times daily       Carboxymethylcellulose Sod PF (REFRESH PLUS) 0.5 % SOLN ophthalmic solution 1 drop 4 times daily as needed for dry eyes       Dexamethasone (DECADRON PO) Take by mouth See Admin  Instructions Give 20 mg every wed.       diltiazem ER (DILT-XR) 120 MG 24 hr capsule Take 1 capsule (120 mg) by mouth daily 30 capsule 1     furosemide (LASIX) 40 MG tablet Take 1 tablet (40 mg) by mouth daily 60 tablet 1     latanoprost (XALATAN) 0.005 % ophthalmic solution Place 1 drop into the right eye daily   6     lidocaine-prilocaine (EMLA) cream Apply to port site 1 hour prior to access 30 g 1     metoprolol succinate ER (TOPROL-XL) 50 MG 24 hr tablet Take 3 tablets (150 mg) by mouth 2 times daily 180 tablet 3     Multiple Vitamin (DAILY MULTIVITAMIN PO) Take 1 tablet by mouth daily.       oxyCODONE IR (ROXICODONE) 5 MG tablet Take 1 tablet (5 mg) by mouth every 4 hours as needed for pain maximum 4 tablet(s) per day       polyethylene glycol (MIRALAX/GLYCOLAX) powder Take 17 g by mouth daily as needed        potassium chloride (KLOR-CON) 20 MEQ packet Take 20 mEq by mouth 2 times daily 60 packet 1     prochlorperazine (COMPAZINE) 10 MG tablet Take 1 tablet (10 mg) by mouth every 6 hours as needed for nausea or vomiting 30 tablet 1     SIMBRINZA 1-0.2 % ophthalmic suspension Place 1 drop into the right eye 2 times daily 1 drop AM and PM  2     Sodium Fluoride (SF 5000 PLUS) 1.1 % CREA Apply to affected area 3 times daily       vitamin D3 (VITAMIN D3) 1000 units (25 mcg) tablet Take 1,000 Units by mouth daily       warfarin (COUMADIN) 4 MG tablet Take 4 mg by mouth daily Saturday and Tuesday       warfarin (COUMADIN) 4 MG tablet Take 2 mg by mouth daily Sunday, Monday, Wednesday, Thursday, Friday         Medication Changes/Rationale:     4/3 reduce lasix 40mg q day    4/4 reduce oxycodone 5mg q 4 h prn    Controlled medications sent with patient:   Medication: oxycodone 5mg , 20 tabs given to patient at the time of discharge to take home     ROS:   4 point ROS including Respiratory, CV, GI and , other than that noted in the HPI,  is negative    Physical Exam:   Vitals: BP 99/62   Pulse 70   Temp 97.1  F  "(36.2  C)   Resp 18   Ht 1.651 m (5' 5\")   Wt 56.8 kg (125 lb 3.2 oz)   SpO2 100%   BMI 20.83 kg/m    BMI= Body mass index is 20.83 kg/m .  GENERAL APPEARANCE:  Alert, in no distress  RESP:  respiratory effort and palpation of chest normal  M/S:   Gait and station abnormal slow gait. uses whch to get around in TCU  Digits and nails normal  SKIN:  Inspection of skin and subcutaneous tissue baseline, Palpation of skin and subcutaneous tissue baseline  NEURO:   Cranial nerves 2-12 are normal tested and grossly at patient's baseline, Examination of sensation by touch normal  PSYCH:  oriented X 3, affect and mood normal     SNF labs: Labs done in SNF are in [facility]. Please refer to them using [facility] Everywhere. and Recent labs in [facility] reviewed by me today.       DISCHARGE PLAN:    Follow up labs: INR on day of discharge    Medical Follow Up:      Follow up with primary care provider in 1 weeks    MTM referral needed and placed by this provider: No    Current [place] scheduled appointments:  Next 5 appointments (look out 90 days)    Apr 11, 2019 10:40 AM CDT  Return Visit with Shayne Roberts MD  [facility] ([facility]  [address] [facility] [address]  [facility] [id number] [phone]           Discharge Services: Home Care:  Occupational Therapy, Physical Therapy, Registered Nurse and Home Health Aide    Discharge Instructions Verbalized to Patient at Discharge:     None      TOTAL DISCHARGE TIME:   Greater than 30 minutes  Electronically signed by:  DAYSI Marquez CNP     Home care Face to Face documentation done in [facility] attached to [facility].                 "

## 2019-04-09 NOTE — PATIENT INSTRUCTIONS
Call CORE nurse for any questions or concerns Mon-Fri 8am-4pm:                                                 #[phone]                                       For concerns after hours:                                               #[phone]     1: Medication changes: None  2: Plan from today: Follow up with Elisabeth next week  3: Lab results: see attached; stable kidney function and electrolytes  Component      Latest Ref Rng & Units 4/1/2019 4/8/2019   Sodium      133 - 144 mmol/L 138 140   Potassium      3.4 - 5.3 mmol/L 3.9 3.8   Chloride      94 - 109 mmol/L 105 106   Carbon Dioxide      20 - 32 mmol/L 27 27   Anion Gap      3 - 14 mmol/L 6 7   Glucose      70 - 99 mg/dL 93 86   Urea Nitrogen      7 - 30 mg/dL 22 19   Creatinine      0.52 - 1.04 mg/dL 0.89 0.73   GFR Estimate      >60 mL/min/1.73:m2 58 (L) 75   GFR Estimate If Black      >60 mL/min/1.73:m2 68 87   Calcium      8.5 - 10.1 mg/dL 8.9 8.9

## 2019-04-09 NOTE — LETTER
4/9/2019    Addy Frias MD  [address]  [facility] [address]    RE: Amira Arreola       Dear Colleague,    I had the pleasure of seeing Amira Arreola in the [facility].    Cardiology Clinic Progress Note  Amira Arreola MRN# [id number]   YOB: 1932 Age: 86 year old   Primary Cardiologist: Dr. Rivera Reason for visit: CORE follow up            Assessment and Plan:   Amira Arreola is a very pleasant 86 year old female with a history of HFpEF, chronic atrial fibrillation, hypertension, and hx of multiple myeloma mets to bone (dx 2016, currently being treated with Daratumab, Velcade, and Dexamethasone every 2 weeks). Hospitalized 3/22/19-3/28/19 presented with dyspnea, leg swelling and intermittent palpitations. Patient was found to have atrial fibrillation with RVR and acute diastolic heart failure exacerbation. Patient here today for CORE follow up.      1.  Chronic diastolic heart failure/HFpEF - Echocardiogram completed 3/23/19 LVEF 55-60%, no wall motion abnormalities, moderate mitral regurgitation, moderate tricuspid regurgitation, and severe pulmonary hypertension. Discharge weight 135#, weight today stable at 128# (furosemide decreased during last clinic visit) Patient appears compensated and euvolemic. Labs from yesterday show stable kidney function and electrolytes.    - NYHA class III, stage C   - Fluid status : euvolemic   - Diuretic regimen : furosemide to 40mg daily   - Aldosterone antagonist : will consider in the future if worsening symptoms   - Blood pressure : controlled   - HF education given, provided with written education materials.    - Will plan to enroll in telehealth tracker next week prior to discharge from [place].    - Will also plan to more extensively review low sodium diet.     2. Chronic atrial fibrillation - stable, asymptomatic, rate controlled, during hospitalization metoprolol succinate was increased to 150mg BID and  started on diltiazem XR 120mg daily to achieve better rate control. This appears to be managing rate control well, rates were noted to be 112 when she first arrived, when rechecked during clinic visit noted to be 88.    - [id number] score 5 (HTN, HF, age, female)   - Continue warfarin for thromboembolic prophylaxis.     3. Hypertension - controlled, continue current medications.     4. Moderate mitral regurgitation, moderate tricuspid regurgitation   - Will consider repeat echocardiogram now that patient is euvolemic.      5. Severe pulmonary hypertension - likely secondary to HFpEF and likely improved with diuresis.    - Will consider repeat echocardiogram now that patient is euvolemic.     5. Multiple myeloma with mets to bone - follows with Dr. Roberts, currently being treated with Daratumab, Velcade, and Dexamethasone every 2 weeks   - Patient reports next treatment scheduled for 4/11/19        Changes today: none    Follow up plan:     Labs to be completed at [place] the day before clinic visit.     CORE followup with Elisabeth in 1 week    Plan to enroll in telehealth monitoring [facility]         History of Presenting Illness:    Amira Arreola is a very pleasant 86 year old female with a history of HFpEF, chronic atrial fibrillation, hypertension, and hx of multiple myeloma mets to bone (dx 2016, currently being treated with Daratumab, Velcade, and Dexamethasone every 2 weeks).     Hospitalized 3/22/19-3/28/19 presented with dyspnea, leg swelling and intermittent palpitations. Patient was found to have atrial fibrillation with RVR and acute diastolic heart failure exacerbation. BNP elevated at 4828, CXR with bilateral moderate effusions with infiltrates vs. Atelectasis. Echocardiogram completed 3/23/19 LVEF 55-60%, no wall motion abnormalities, moderate mitral regurgitation, moderate tricuspid regurgitation, and severe pulmonary hypertension. Oral diltiazem started and metoprolol succinate escalated  while hospitalized to help with rate control. Diuresed with IV furosemide and discharged on PO furosemide 40mg BID. Weight 3/24/19 147# (doesn't appear admission weight was completed). Discharge weight 135#. Discharged to TCU.     During last CORE visit on 4/2/19 patient was noted to be hypotensive and slight bump in creatinine 0.89 (baseline 0.6-0.7), furosemide decreased to 40mg daily. CORE RN called to follow up on patient at [place], getting report that patient is feeling great with no HF symptoms. Weight was noted to be down from 133.2# 4/2/19 to 126.2 4/5/19.     Patient is here today for CORE followup. Patient reports feeling good. Currently at TCU. Monitoring weights daily at [facility]. No paperwork sent with patient for visit. Clinic weights stable, 129# last week and 128# this week. Continue to report improvement in lower extremity edema, still noting some edema in feet/ankles. Denies abdominal distention/bloating. Denies shortness of breath at rest. Working PT/OT, but denies shortness of breath with current levels of activity. Notes she may get a little short of breath with getting dressed. Sleeping good. Denies orthopnea or PND. Sleeping with HOB slightly elevated. Report good energy levels.     Patient denies chest pain or chest tightness. Denies dizziness, lightheadedness or other presyncopal symptoms. Denies tachycardia or palpitations. Next cycle of chemo therapy 4/11/19. Care conference scheduled for tomorrow at [place].    Labs from yesterday show stable kidney function, creatinine 0.73, electrolytes stable. Blood pressure 104/64 and HR 88 in clinic today.     Appetite is good. Not adding salt to foods. Working with PT/OT. No tobacco or alcohol use.         Recent Hospitalizations   3/22/19-3/28/19 presented with dyspnea, leg swelling and intermittent palpitations. Patient was found to have atrial fibrillation with RVR and acute diastolic heart failure exacerbation. Weight 3/24/19 147# (doesn't  appear admission weight was completed). Discharge weight 135#.        Social History    , 6 children, Enjoys keeping the house clean and orderly. Retired nurse.   Social History     Socioeconomic History     Marital status:      Spouse name: Tom     Number of children: 6     Years of education: Not on file     Highest education level: Not on file   Occupational History     Occupation: rn anesthetist     Employer: RETIRED   Social Needs     Financial resource strain: Not on file     Food insecurity:     Worry: Not on file     Inability: Not on file     Transportation needs:     Medical: Not on file     Non-medical: Not on file   Tobacco Use     Smoking status: Never Smoker     Smokeless tobacco: Never Used   Substance and Sexual Activity     Alcohol use: No     Alcohol/week: 0.0 oz     Drug use: No     Sexual activity: Never   Lifestyle     Physical activity:     Days per week: Not on file     Minutes per session: Not on file     Stress: Not on file   Relationships     Social connections:     Talks on phone: Not on file     Gets together: Not on file     Attends Muslim service: Not on file     Active member of club or organization: Not on file     Attends meetings of clubs or organizations: Not on file     Relationship status: Not on file     Intimate partner violence:     Fear of current or ex partner: Not on file     Emotionally abused: Not on file     Physically abused: Not on file     Forced sexual activity: Not on file   Other Topics Concern     Parent/sibling w/ CABG, MI or angioplasty before 65F 55M? Not Asked   Social History Narrative     Not on file            Review of Systems:   Skin:  Positive for bruising   Eyes:  Positive for glasses  ENT:  Negative    Respiratory:  Negative    Cardiovascular:    Positive for;palpitations  Gastroenterology: Positive for poor appetite;constipation  Genitourinary:  Negative    Musculoskeletal:  Positive for arthritis  Neurologic:  Negative   "  Psychiatric:  Positive for anxiety  Heme/Lymph/Imm:  Positive for    Endocrine:  Negative           Physical Exam:   Vitals: /64   Pulse 88   Ht 1.651 m (5' 5\")   Wt 58.4 kg (128 lb 12.8 oz)   SpO2 97%   BMI 21.43 kg/m      Wt Readings from Last 4 Encounters:   04/09/19 58.4 kg (128 lb 12.8 oz)   04/05/19 57.2 kg (126 lb 3.2 oz)   04/04/19 57.2 kg (126 lb 3.2 oz)   04/01/19 67.1 kg (148 lb)     GEN: well nourished, in no acute distress.  HEENT:  Pupils equal, round. Sclerae nonicteric.   NECK: Supple, no masses appreciated. No JVD appreciated on exam at 90 degrees  C/V:  Irregularly irregular rate and rhythm, no murmur, rub or gallop.   RESP: Respirations are unlabored. Clear bilaterally. No crackles, wheezes or rales.    GI: Abdomen distended, nontender.  EXTREM: No LE edema   NEURO: Alert and oriented, cooperative.  SKIN: Warm and dry.        Data:   ECHO 3/23/19  The left ventricle is normal in size.  The visual ejection fraction is estimated at 55-60%.  No regional wall motion abnormalities noted.  There is moderate (2+) mitral regurgitation.  There is moderate (2+) tricuspid regurgitation.  Severe (>55mmHg) pulmonary hypertension is present.  The rhythm was rapid atrial fibrillation.    LIPID RESULTS:  Lab Results   Component Value Date    CHOL 160 10/12/2016    HDL 76 10/12/2016    LDL 71 10/12/2016    TRIG 66 10/12/2016    CHOLHDLRATIO 2.3 09/21/2015     LIVER ENZYME RESULTS:  Lab Results   Component Value Date    AST 34 03/13/2019    ALT 68 (H) 03/13/2019     CBC RESULTS:  Lab Results   Component Value Date    WBC 9.5 03/24/2019    RBC 3.35 (L) 03/24/2019    HGB 12.2 03/29/2019    HCT 31.5 (L) 03/24/2019    MCV 94 03/24/2019    MCH 31.3 03/24/2019    MCHC 33.3 03/24/2019    RDW 14.7 03/24/2019     03/24/2019     BMP RESULTS:  Lab Results   Component Value Date     04/08/2019    POTASSIUM 3.8 04/08/2019    CHLORIDE 106 04/08/2019    CO2 27 04/08/2019    ANIONGAP 7 04/08/2019    GLC " 86 04/08/2019    BUN 19 04/08/2019    CR 0.73 04/08/2019    GFRESTIMATED 75 04/08/2019    GFRESTBLACK 87 04/08/2019    BRADLEY 8.9 04/08/2019      INR RESULTS:  Lab Results   Component Value Date    INR 2.73 (H) 03/28/2019    INR 2.55 (H) 03/27/2019            Medications     Current Outpatient Medications   Medication Sig Dispense Refill     Acetaminophen (TYLENOL PO) Take 500 mg by mouth every 6 hours as needed for mild pain or fever       acyclovir (ZOVIRAX) 400 MG tablet Take 400 mg by mouth 2 times daily       Carboxymethylcellulose Sod PF (REFRESH PLUS) 0.5 % SOLN ophthalmic solution 1 drop 4 times daily as needed for dry eyes       Dexamethasone (DECADRON PO) Take by mouth See Admin Instructions Give 20 mg every wed.       diltiazem ER (DILT-XR) 120 MG 24 hr capsule Take 1 capsule (120 mg) by mouth daily 30 capsule 1     furosemide (LASIX) 40 MG tablet Take 1 tablet (40 mg) by mouth daily 60 tablet 1     latanoprost (XALATAN) 0.005 % ophthalmic solution Place 1 drop into the right eye daily   6     lidocaine-prilocaine (EMLA) cream Apply to port site 1 hour prior to access 30 g 1     metoprolol succinate ER (TOPROL-XL) 50 MG 24 hr tablet Take 3 tablets (150 mg) by mouth 2 times daily 180 tablet 3     Multiple Vitamin (DAILY MULTIVITAMIN PO) Take 1 tablet by mouth daily.       oxyCODONE IR (ROXICODONE) 5 MG tablet Take 1 tablet (5 mg) by mouth every 4 hours as needed for pain maximum 4 tablet(s) per day       polyethylene glycol (MIRALAX/GLYCOLAX) powder Take 17 g by mouth daily as needed        potassium chloride (KLOR-CON) 20 MEQ packet Take 20 mEq by mouth 2 times daily 60 packet 1     prochlorperazine (COMPAZINE) 10 MG tablet Take 1 tablet (10 mg) by mouth every 6 hours as needed for nausea or vomiting 30 tablet 1     SIMBRINZA 1-0.2 % ophthalmic suspension Place 1 drop into the right eye 2 times daily 1 drop AM and PM  2     Sodium Fluoride (SF 5000 PLUS) 1.1 % CREA Apply to affected area 3 times daily        vitamin D3 (VITAMIN D3) 1000 units (25 mcg) tablet Take 1,000 Units by mouth daily       warfarin (COUMADIN) 4 MG tablet Take 4 mg by mouth daily Saturday and Tuesday       warfarin (COUMADIN) 4 MG tablet Take 2 mg by mouth daily , Monday, Wednesday, Thursday, Friday            Past Medical History     Past Medical History:   Diagnosis Date     Abnormal CXR     then ct done and not significant     Ascending aorta dilatation (H) 2016    on echo, mild, fu  4.0, slightly larger     Cancer, metastatic to bone (H)     due to myeloma     CHF exacerbation (H) 3/22/2019     Colonic polyp 2008    adenomatous, fu  tics only     Compression fracture     multiple areas of spine     Dry eyes      Elevated MCV     b12 and folic acid nl     HTN (hypertension)     off meds for years     Lung nodule 2018    on ct, 4mm, ct done for fu abnl cxr     Menorrhagia     hysteroscopy and d and c done     MGUS (monoclonal gammopathy of unknown significance)     eval by Dr. Roberts     Multiple myeloma (H) 2016    dx  at [place], bone lesions seen on mri      OAB (overactive bladder)     Dr. Grullon     Osteoporosis     fu done  and stable, went off meds then, fu done ; has had gyn fu and added evista  by gyn     Palpitations     nl echo, mildly dilated asc aorta     Paroxysmal atrial fibrillation (H)     had palp and ziopatch showed it, echo nl lv fxn, mild mr and tr, added coum and toprol, toprol dose raised 16     Sciatica of left side     Dr. Helen Ricardo      SVT (supraventricular tachycardia) (H)     on ziopatch     Thrombocytopenia (H)      Past Surgical History:   Procedure Laterality Date     BONE MARROW BIOPSY, BONE SPECIMEN, NEEDLE/TROCAR N/A 2016    Procedure: BIOPSY BONE MARROW;  Surgeon: Bryan Patel MD;  Location: [facility]     CATARACT IOL, RT/LT        SECTION  ,      COLONOSCOPY  2013     Procedure: COLONOSCOPY;  COLONOSCOPY;  Surgeon: Steffany Rockwell MD;  Location:  GI     EXCISE EXOSTOSIS TIBIA / FIBULA  6/30/2014    Procedure: EXCISE EXOSTOSIS TIBIA / FIBULA;  Surgeon: Naila Pichardo MD;  Location:  [facility]     hysteroscopy and d and c  2002    due to bleeding     left anle replacement  2007     right ankle surgery  2003     Family History   Problem Relation Age of Onset     Heart Disease Father      C.A.D. Mother      Cerebrovascular Disease Brother      Family History Negative Sister      Family History Negative Sister      Family History Negative Brother             Allergies   Blood transfusion related (informational only) and Penicillin [penicillins]        DAYSI Arellano CNP  [facility]  Pager: [phone]      Thank you for allowing me to participate in the care of your patient.    Sincerely,     DAYSI Arellano CNP     [facility]

## 2019-04-09 NOTE — PROGRESS NOTES
Cardiology Clinic Progress Note  Amira Arreola MRN# [id number]   YOB: 1932 Age: 86 year old   Primary Cardiologist: Dr. Rivera Reason for visit: CORE follow up            Assessment and Plan:   Amira Arreola is a very pleasant 86 year old female with a history of HFpEF, chronic atrial fibrillation, hypertension, and hx of multiple myeloma mets to bone (dx 2016, currently being treated with Daratumab, Velcade, and Dexamethasone every 2 weeks). Hospitalized 3/22/19-3/28/19 presented with dyspnea, leg swelling and intermittent palpitations. Patient was found to have atrial fibrillation with RVR and acute diastolic heart failure exacerbation. Patient here today for CORE follow up.      1.  Chronic diastolic heart failure/HFpEF - Echocardiogram completed 3/23/19 LVEF 55-60%, no wall motion abnormalities, moderate mitral regurgitation, moderate tricuspid regurgitation, and severe pulmonary hypertension. Discharge weight 135#, weight today stable at 128# (furosemide decreased during last clinic visit) Patient appears compensated and euvolemic. Labs from yesterday show stable kidney function and electrolytes.    - NYHA class III, stage C   - Fluid status : euvolemic   - Diuretic regimen : furosemide to 40mg daily   - Aldosterone antagonist : will consider in the future if worsening symptoms   - Blood pressure : controlled   - HF education given, provided with written education materials.    - Will plan to enroll in telehealth tracker next week prior to discharge from [place].    - Will also plan to more extensively review low sodium diet.     2. Chronic atrial fibrillation - stable, asymptomatic, rate controlled, during hospitalization metoprolol succinate was increased to 150mg BID and started on diltiazem XR 120mg daily to achieve better rate control. This appears to be managing rate control well, rates were noted to be 112 when she first arrived, when rechecked during clinic visit noted to be 88.    -  [id number] score 5 (HTN, HF, age, female)   - Continue warfarin for thromboembolic prophylaxis.     3. Hypertension - controlled, continue current medications.     4. Moderate mitral regurgitation, moderate tricuspid regurgitation   - Will consider repeat echocardiogram now that patient is euvolemic.      5. Severe pulmonary hypertension - likely secondary to HFpEF and likely improved with diuresis.    - Will consider repeat echocardiogram now that patient is euvolemic.     5. Multiple myeloma with mets to bone - follows with Dr. Roberts, currently being treated with Daratumab, Velcade, and Dexamethasone every 2 weeks   - Patient reports next treatment scheduled for 4/11/19        Changes today: none    Follow up plan:     Labs to be completed at [facility] the day before clinic visit.     CORE followup with Elisabeth in 1 week    Plan to enroll in telehealth monitoring next week at /u [facility].         History of Presenting Illness:    Amira Arreola is a very pleasant 86 year old female with a history of HFpEF, chronic atrial fibrillation, hypertension, and hx of multiple myeloma mets to bone (dx 2016, currently being treated with Daratumab, Velcade, and Dexamethasone every 2 weeks).     Hospitalized 3/22/19-3/28/19 presented with dyspnea, leg swelling and intermittent palpitations. Patient was found to have atrial fibrillation with RVR and acute diastolic heart failure exacerbation. BNP elevated at 4828, CXR with bilateral moderate effusions with infiltrates vs. Atelectasis. Echocardiogram completed 3/23/19 LVEF 55-60%, no wall motion abnormalities, moderate mitral regurgitation, moderate tricuspid regurgitation, and severe pulmonary hypertension. Oral diltiazem started and metoprolol succinate escalated while hospitalized to help with rate control. Diuresed with IV furosemide and discharged on PO furosemide 40mg BID. Weight 3/24/19 147# (doesn't appear admission weight was completed). Discharge weight 135#. Discharged to  TCU.     During last CORE visit on 4/2/19 patient was noted to be hypotensive and slight bump in creatinine 0.89 (baseline 0.6-0.7), furosemide decreased to 40mg daily. CORE RN called to follow up on patient at [place] [facility], getting report that patient is feeling great with no HF symptoms. Weight was noted to be down from 133.2# 4/2/19 to 126.2 4/5/19.     Patient is here today for CORE followup. Patient reports feeling good. Currently at TCU. Monitoring weights daily at . No paperwork sent with patient for visit. Clinic weights stable, 129# last week and 128# this week. Continue to report improvement in lower extremity edema, still noting some edema in feet/ankles. Denies abdominal distention/bloating. Denies shortness of breath at rest. Working PT/OT, but denies shortness of breath with current levels of activity. Notes she may get a little short of breath with getting dressed. Sleeping good. Denies orthopnea or PND. Sleeping with HOB slightly elevated. Report good energy levels.     Patient denies chest pain or chest tightness. Denies dizziness, lightheadedness or other presyncopal symptoms. Denies tachycardia or palpitations. Next cycle of chemo therapy 4/11/19. Care conference scheduled for tomorrow at [facility].    Labs from yesterday show stable kidney function, creatinine 0.73, electrolytes stable. Blood pressure 104/64 and HR 88 in clinic today.     Appetite is good. Not adding salt to foods. Working with PT/OT. No tobacco or alcohol use.         Recent Hospitalizations   3/22/19-3/28/19 presented with dyspnea, leg swelling and intermittent palpitations. Patient was found to have atrial fibrillation with RVR and acute diastolic heart failure exacerbation. Weight 3/24/19 147# (doesn't appear admission weight was completed). Discharge weight 135#.        Social History    , 6 children, Enjoys keeping the house clean and orderly. Retired nurse.   Social History     Socioeconomic History     Marital  "status:      Spouse name: Tom     Number of children: 6     Years of education: Not on file     Highest education level: Not on file   Occupational History     Occupation: rn anesthetist     Employer: RETIRED   Social Needs     Financial resource strain: Not on file     Food insecurity:     Worry: Not on file     Inability: Not on file     Transportation needs:     Medical: Not on file     Non-medical: Not on file   Tobacco Use     Smoking status: Never Smoker     Smokeless tobacco: Never Used   Substance and Sexual Activity     Alcohol use: No     Alcohol/week: 0.0 oz     Drug use: No     Sexual activity: Never   Lifestyle     Physical activity:     Days per week: Not on file     Minutes per session: Not on file     Stress: Not on file   Relationships     Social connections:     Talks on phone: Not on file     Gets together: Not on file     Attends Voodoo service: Not on file     Active member of club or organization: Not on file     Attends meetings of clubs or organizations: Not on file     Relationship status: Not on file     Intimate partner violence:     Fear of current or ex partner: Not on file     Emotionally abused: Not on file     Physically abused: Not on file     Forced sexual activity: Not on file   Other Topics Concern     Parent/sibling w/ CABG, MI or angioplasty before 65F 55M? Not Asked   Social History Narrative     Not on file            Review of Systems:   Skin:  Positive for bruising   Eyes:  Positive for glasses  ENT:  Negative    Respiratory:  Negative    Cardiovascular:    Positive for;palpitations  Gastroenterology: Positive for poor appetite;constipation  Genitourinary:  Negative    Musculoskeletal:  Positive for arthritis  Neurologic:  Negative    Psychiatric:  Positive for anxiety  Heme/Lymph/Imm:  Positive for    Endocrine:  Negative           Physical Exam:   Vitals: /64   Pulse 88   Ht 1.651 m (5' 5\")   Wt 58.4 kg (128 lb 12.8 oz)   SpO2 97%   BMI 21.43 kg/m   "   Wt Readings from Last 4 Encounters:   04/09/19 58.4 kg (128 lb 12.8 oz)   04/05/19 57.2 kg (126 lb 3.2 oz)   04/04/19 57.2 kg (126 lb 3.2 oz)   04/01/19 67.1 kg (148 lb)     GEN: well nourished, in no acute distress.  HEENT:  Pupils equal, round. Sclerae nonicteric.   NECK: Supple, no masses appreciated. No JVD appreciated on exam at 90 degrees  C/V:  Irregularly irregular rate and rhythm, no murmur, rub or gallop.   RESP: Respirations are unlabored. Clear bilaterally. No crackles, wheezes or rales.    GI: Abdomen distended, nontender.  EXTREM: No LE edema   NEURO: Alert and oriented, cooperative.  SKIN: Warm and dry.        Data:   ECHO 3/23/19  The left ventricle is normal in size.  The visual ejection fraction is estimated at 55-60%.  No regional wall motion abnormalities noted.  There is moderate (2+) mitral regurgitation.  There is moderate (2+) tricuspid regurgitation.  Severe (>55mmHg) pulmonary hypertension is present.  The rhythm was rapid atrial fibrillation.    LIPID RESULTS:  Lab Results   Component Value Date    CHOL 160 10/12/2016    HDL 76 10/12/2016    LDL 71 10/12/2016    TRIG 66 10/12/2016    CHOLHDLRATIO 2.3 09/21/2015     LIVER ENZYME RESULTS:  Lab Results   Component Value Date    AST 34 03/13/2019    ALT 68 (H) 03/13/2019     CBC RESULTS:  Lab Results   Component Value Date    WBC 9.5 03/24/2019    RBC 3.35 (L) 03/24/2019    HGB 12.2 03/29/2019    HCT 31.5 (L) 03/24/2019    MCV 94 03/24/2019    MCH 31.3 03/24/2019    MCHC 33.3 03/24/2019    RDW 14.7 03/24/2019     03/24/2019     BMP RESULTS:  Lab Results   Component Value Date     04/08/2019    POTASSIUM 3.8 04/08/2019    CHLORIDE 106 04/08/2019    CO2 27 04/08/2019    ANIONGAP 7 04/08/2019    GLC 86 04/08/2019    BUN 19 04/08/2019    CR 0.73 04/08/2019    GFRESTIMATED 75 04/08/2019    GFRESTBLACK 87 04/08/2019    BRADLEY 8.9 04/08/2019      INR RESULTS:  Lab Results   Component Value Date    INR 2.73 (H) 03/28/2019    INR 2.55 (H)  03/27/2019            Medications     Current Outpatient Medications   Medication Sig Dispense Refill     Acetaminophen (TYLENOL PO) Take 500 mg by mouth every 6 hours as needed for mild pain or fever       acyclovir (ZOVIRAX) 400 MG tablet Take 400 mg by mouth 2 times daily       Carboxymethylcellulose Sod PF (REFRESH PLUS) 0.5 % SOLN ophthalmic solution 1 drop 4 times daily as needed for dry eyes       Dexamethasone (DECADRON PO) Take by mouth See Admin Instructions Give 20 mg every wed.       diltiazem ER (DILT-XR) 120 MG 24 hr capsule Take 1 capsule (120 mg) by mouth daily 30 capsule 1     furosemide (LASIX) 40 MG tablet Take 1 tablet (40 mg) by mouth daily 60 tablet 1     latanoprost (XALATAN) 0.005 % ophthalmic solution Place 1 drop into the right eye daily   6     lidocaine-prilocaine (EMLA) cream Apply to port site 1 hour prior to access 30 g 1     metoprolol succinate ER (TOPROL-XL) 50 MG 24 hr tablet Take 3 tablets (150 mg) by mouth 2 times daily 180 tablet 3     Multiple Vitamin (DAILY MULTIVITAMIN PO) Take 1 tablet by mouth daily.       oxyCODONE IR (ROXICODONE) 5 MG tablet Take 1 tablet (5 mg) by mouth every 4 hours as needed for pain maximum 4 tablet(s) per day       polyethylene glycol (MIRALAX/GLYCOLAX) powder Take 17 g by mouth daily as needed        potassium chloride (KLOR-CON) 20 MEQ packet Take 20 mEq by mouth 2 times daily 60 packet 1     prochlorperazine (COMPAZINE) 10 MG tablet Take 1 tablet (10 mg) by mouth every 6 hours as needed for nausea or vomiting 30 tablet 1     SIMBRINZA 1-0.2 % ophthalmic suspension Place 1 drop into the right eye 2 times daily 1 drop AM and PM  2     Sodium Fluoride (SF 5000 PLUS) 1.1 % CREA Apply to affected area 3 times daily       vitamin D3 (VITAMIN D3) 1000 units (25 mcg) tablet Take 1,000 Units by mouth daily       warfarin (COUMADIN) 4 MG tablet Take 4 mg by mouth daily Saturday and Tuesday       warfarin (COUMADIN) 4 MG tablet Take 2 mg by mouth daily  , Monday, Wednesday, Thursday, Friday            Past Medical History     Past Medical History:   Diagnosis Date     Abnormal CXR 2018    then ct done and not significant     Ascending aorta dilatation (H) 2016    on echo, mild, fu  4.0, slightly larger     Cancer, metastatic to bone (H)     due to myeloma     CHF exacerbation (H) 3/22/2019     Colonic polyp 2008    adenomatous, fu  tics only     Compression fracture     multiple areas of spine     Dry eyes      Elevated MCV     b12 and folic acid nl     HTN (hypertension)     off meds for years     Lung nodule 2018    on ct, 4mm, ct done for fu abnl cxr     Menorrhagia     hysteroscopy and d and c done     MGUS (monoclonal gammopathy of unknown significance)     eval by Dr. Roberts     Multiple myeloma (H)     dx  at [place], bone lesions seen on mri      OAB (overactive bladder)     Dr. Grullon     Osteoporosis     fu done  and stable, went off meds then, fu done ; has had gyn fu and added evista  by gyn     Palpitations     nl echo, mildly dilated asc aorta     Paroxysmal atrial fibrillation (H)     had palp and ziopatch showed it, echo nl lv fxn, mild mr and tr, added coum and toprol, toprol dose raised 16     Sciatica of left side     Dr. Helen Ricardo      SVT (supraventricular tachycardia) (H)     on ziopatch     Thrombocytopenia (H)      Past Surgical History:   Procedure Laterality Date     BONE MARROW BIOPSY, BONE SPECIMEN, NEEDLE/TROCAR N/A 2016    Procedure: BIOPSY BONE MARROW;  Surgeon: Byran Patel MD;  Location:  GI     CATARACT IOL, RT/LT        SECTION  , 1966     COLONOSCOPY  2013    Procedure: COLONOSCOPY;  COLONOSCOPY;  Surgeon: Steffany Rockwell MD;  Location:  GI     EXCISE EXOSTOSIS TIBIA / FIBULA  2014    Procedure: EXCISE EXOSTOSIS TIBIA / FIBULA;  Surgeon: Naila Pichardo MD;   Location:  SD     hysteroscopy and d and c  2002    due to bleeding     left anle replacement  2007     right ankle surgery  2003     Family History   Problem Relation Age of Onset     Heart Disease Father      C.A.D. Mother      Cerebrovascular Disease Brother      Family History Negative Sister      Family History Negative Sister      Family History Negative Brother             Allergies   Blood transfusion related (informational only) and Penicillin [penicillins]        DAYSI Arellano [facility]  Pager: [phone]

## 2019-04-10 ENCOUNTER — DISCHARGE SUMMARY NURSING HOME (OUTPATIENT)
Dept: GERIATRICS | Facility: CLINIC | Age: 84
End: 2019-04-10
Payer: MEDICARE

## 2019-04-10 ENCOUNTER — CARE COORDINATION (OUTPATIENT)
Dept: CARDIOLOGY | Facility: CLINIC | Age: 84
End: 2019-04-10

## 2019-04-10 DIAGNOSIS — I10 BENIGN ESSENTIAL HYPERTENSION: ICD-10-CM

## 2019-04-10 DIAGNOSIS — S22.000G CLOSED COMPRESSION FRACTURE OF THORACIC VERTEBRA WITH DELAYED HEALING, SUBSEQUENT ENCOUNTER: ICD-10-CM

## 2019-04-10 DIAGNOSIS — R53.81 PHYSICAL DECONDITIONING: ICD-10-CM

## 2019-04-10 DIAGNOSIS — Z79.01 LONG TERM CURRENT USE OF ANTICOAGULANT THERAPY: ICD-10-CM

## 2019-04-10 DIAGNOSIS — I48.0 PAROXYSMAL ATRIAL FIBRILLATION (H): ICD-10-CM

## 2019-04-10 DIAGNOSIS — C90.00 MULTIPLE MYELOMA NOT HAVING ACHIEVED REMISSION (H): ICD-10-CM

## 2019-04-10 DIAGNOSIS — I50.32 CHRONIC DIASTOLIC HEART FAILURE (H): Primary | ICD-10-CM

## 2019-04-10 DIAGNOSIS — I50.9 ACUTE ON CHRONIC CONGESTIVE HEART FAILURE, UNSPECIFIED HEART FAILURE TYPE (H): ICD-10-CM

## 2019-04-10 PROCEDURE — 99316 NF DSCHRG MGMT 30 MIN+: CPT | Performed by: NURSE PRACTITIONER

## 2019-04-10 RX ORDER — DILTIAZEM HYDROCHLORIDE 120 MG/1
120 CAPSULE, EXTENDED RELEASE ORAL DAILY
Qty: 30 CAPSULE | Refills: 1 | Status: SHIPPED | OUTPATIENT
Start: 2019-04-10 | End: 2019-05-15

## 2019-04-10 RX ORDER — METOPROLOL SUCCINATE 50 MG/1
150 TABLET, EXTENDED RELEASE ORAL 2 TIMES DAILY
Qty: 180 TABLET | Refills: 3 | Status: SHIPPED | OUTPATIENT
Start: 2019-04-10 | End: 2019-05-15

## 2019-04-10 RX ORDER — POTASSIUM CHLORIDE 1.5 G/1.58G
20 POWDER, FOR SOLUTION ORAL 2 TIMES DAILY
Qty: 60 PACKET | Refills: 1 | Status: SHIPPED | OUTPATIENT
Start: 2019-04-10 | End: 2019-05-15

## 2019-04-10 RX ORDER — FUROSEMIDE 40 MG
40 TABLET ORAL DAILY
Qty: 60 TABLET | Refills: 1 | Status: SHIPPED | OUTPATIENT
Start: 2019-04-10 | End: 2019-05-15

## 2019-04-10 NOTE — PROGRESS NOTES
Pt needs BMP done prior to next CORE OV with Elisabeth on 4/17/19. Called [facility] ([facility]). Pt to discharge home possibly tomorrow. Care Conference taking place right now, HUC I spoke with is unsure whether pt will be discharging with home care. I asked to have someone call back today after the Care Conference to discuss further/update on discharge plans. Will arrange BMP to be done on 4/15 or 4/16 through home care nurse, if that is the case; or if patient not having home care will need to make a lab appt here prior to OV with Elisabeth. Cinthya Landeros, RN on 4/10/2019 at 10:51 AM

## 2019-04-10 NOTE — LETTER
4/10/2019        RE: Amira Arreola  [address]  [place] [address]        [facility] DISCHARGE SUMMARY  PATIENT'S NAME: Amira Arreola  YOB: 1932  MEDICAL RECORD NUMBER:  [id number]  Place of Service where encounter took place:  [facility] ([facility]) [[id number]]    PRIMARY CARE PROVIDER AND CLINIC RESPONSIBLE AFTER TRANSFER:   Addy Frias MD, [address] / [place] [address]    Non-[facility] Provider     Transferring providers: DAYSI Marquez CNP, Dr. Mihir MD  Recent Hospitalization/ED:  [facility] stay 03/22/19 to 03/28/19.  Date of SNF Admission: March / 28 / 2019  Date of SNF (anticipated) Discharge: April / 11 / 2019  Discharged to: with family home  Cognitive Scores: SLUMS: 6/30  Physical Function: Ambulating 250 ft with walker  DME: Walker    CODE STATUS/ADVANCE DIRECTIVES DISCUSSION:  Full Code   ALLERGIES: Blood transfusion related (informational only) and Penicillin [penicillins]    DISCHARGE DIAGNOSIS/NURSING FACILITY COURSE:   (I50.32) Chronic diastolic heart failure (H)  (primary encounter diagnosis)  Comment: patient was in TCU following hospitalization due to dyspnea, LE swelling and intermittent. She was treated for acute diastolic heart failure and AF with RVR. Since in TCU she did follow up with CORE clinic on 4/2 and 4/9. Lasix was reduced to 40mg q day. Her weight has been stable at 125#. She is breathing comfortably and LE is less.   Plan: HOME CARE NURSING REFERRAL        Continue lasix 40mg q day with kcl 20 meq BID.     (I48.0) Paroxysmal atrial fibrillation (H)  Comment: during hospital stay she did require addition of diltiazem and increase in metoprolol due to rapid HR. Since in TCU HR 70-90. She is anticoagulated with warfarin. INR has been stable at 2.3 on PTA dose of warfarin  Plan:continue diltizem ER 120mg q day with metoprolol ER 150mg BID  (Z79.01) Long term current use of  anticoagulant therapy  Comment: INR therapeutic  Plan: coumadin 4mg  On Saturday and Tuesday and 2mg ROW.   INR on 4/15            (I10) Benign essential hypertension  Comment: BPs ok on current meds  Plan: monitor            (C90.00) Multiple myeloma not having achieved remission (H)  Comment: follows with DR Roberts.chemo has been on hold in TCU      (S22.000G) Closed compression fracture of thoracic vertebra with delayed healing, subsequent encounter  Comment: occasional back pain. Oxycodone dose was reduced due to sedation. She takes about 5mg q day  Plan:        Oxycodone 5mg q 4h prn    (R53.81) Physical deconditioning  Comment: due to HR and MM. She made progress with therapy in TCU and feels strong enough to return home with home care. Family is setting up additional home care.  Plan: HOME CARE NURSING REFERRAL             Past Medical History:  has a past medical history of Abnormal CXR (2018), Ascending aorta dilatation (H) (04/2016), Cancer, metastatic to bone (H), CHF exacerbation (H) (3/22/2019), Colonic polyp (2008), Compression fracture (2016), Dry eyes, Elevated MCV (2015), HTN (hypertension) (2000), Lung nodule (08/2018), Menorrhagia (2002), MGUS (monoclonal gammopathy of unknown significance) (2015), Multiple myeloma (H) (2016), OAB (overactive bladder) (2013), Osteoporosis, Palpitations (4/16), Paroxysmal atrial fibrillation (H) (4/16), Sciatica of left side (12/13), Shingles (2004), SVT (supraventricular tachycardia) (H) (4/16), and Thrombocytopenia (H) (2014). She also has no past medical history of PONV (postoperative nausea and vomiting).    Discharge Medications:  Current Outpatient Medications   Medication Sig Dispense Refill     Acetaminophen (TYLENOL PO) Take 500 mg by mouth every 6 hours as needed for mild pain or fever       acyclovir (ZOVIRAX) 400 MG tablet Take 400 mg by mouth 2 times daily       Carboxymethylcellulose Sod PF (REFRESH PLUS) 0.5 % SOLN ophthalmic solution 1 drop 4 times  daily as needed for dry eyes       Dexamethasone (DECADRON PO) Take by mouth See Admin Instructions Give 20 mg every wed.       diltiazem ER (DILT-XR) 120 MG 24 hr capsule Take 1 capsule (120 mg) by mouth daily 30 capsule 1     furosemide (LASIX) 40 MG tablet Take 1 tablet (40 mg) by mouth daily 60 tablet 1     latanoprost (XALATAN) 0.005 % ophthalmic solution Place 1 drop into the right eye daily   6     lidocaine-prilocaine (EMLA) cream Apply to port site 1 hour prior to access 30 g 1     metoprolol succinate ER (TOPROL-XL) 50 MG 24 hr tablet Take 3 tablets (150 mg) by mouth 2 times daily 180 tablet 3     Multiple Vitamin (DAILY MULTIVITAMIN PO) Take 1 tablet by mouth daily.       oxyCODONE IR (ROXICODONE) 5 MG tablet Take 1 tablet (5 mg) by mouth every 4 hours as needed for pain maximum 4 tablet(s) per day       polyethylene glycol (MIRALAX/GLYCOLAX) powder Take 17 g by mouth daily as needed        potassium chloride (KLOR-CON) 20 MEQ packet Take 20 mEq by mouth 2 times daily 60 packet 1     prochlorperazine (COMPAZINE) 10 MG tablet Take 1 tablet (10 mg) by mouth every 6 hours as needed for nausea or vomiting 30 tablet 1     SIMBRINZA 1-0.2 % ophthalmic suspension Place 1 drop into the right eye 2 times daily 1 drop AM and PM  2     Sodium Fluoride (SF 5000 PLUS) 1.1 % CREA Apply to affected area 3 times daily       vitamin D3 (VITAMIN D3) 1000 units (25 mcg) tablet Take 1,000 Units by mouth daily       warfarin (COUMADIN) 4 MG tablet Take 4 mg by mouth daily Saturday and Tuesday       warfarin (COUMADIN) 4 MG tablet Take 2 mg by mouth daily Sunday, Monday, Wednesday, Thursday, Friday         Medication Changes/Rationale:     4/3 reduce lasix 40mg q day    4/4 reduce oxycodone 5mg q 4 h prn    Controlled medications sent with patient:   Medication: oxycodone 5mg , 20 tabs given to patient at the time of discharge to take home     ROS:   4 point ROS including Respiratory, CV, GI and , other than that noted in  "the HPI,  is negative    Physical Exam:   Vitals: BP 99/62   Pulse 70   Temp 97.1  F (36.2  C)   Resp 18   Ht 1.651 m (5' 5\")   Wt 56.8 kg (125 lb 3.2 oz)   SpO2 100%   BMI 20.83 kg/m     BMI= Body mass index is 20.83 kg/m .  GENERAL APPEARANCE:  Alert, in no distress  RESP:  respiratory effort and palpation of chest normal  M/S:   Gait and station abnormal slow gait. uses whch to get around in TCU  Digits and nails normal  SKIN:  Inspection of skin and subcutaneous tissue baseline, Palpation of skin and subcutaneous tissue baseline  NEURO:   Cranial nerves 2-12 are normal tested and grossly at patient's baseline, Examination of sensation by touch normal  PSYCH:  oriented X 3, affect and mood normal     SNF labs: Labs done in SNF are in [facility]. Please refer to them using [employer] Everywhere. and Recent labs in [facility] reviewed by me today.       DISCHARGE PLAN:    Follow up labs: INR on day of discharge    Medical Follow Up:      Follow up with primary care provider in 1 weeks    MTM referral needed and placed by this provider: No    Current [place] scheduled appointments:  Next 5 appointments (look out 90 days)    Apr 11, 2019 10:40 AM CDT  Return Visit with Shayne Roberts MD  [facility]  [address] [facility] [address]  [facility] [address]  [phone]           Discharge Services: Home Care:  Occupational Therapy, Physical Therapy, Registered Nurse and Home Health Aide    Discharge Instructions Verbalized to Patient at Discharge:     None      TOTAL DISCHARGE TIME:   Greater than 30 minutes  Electronically signed by:  DAYSI Marquez CNP     Home care Face to Face documentation done in [facility] attached to Home care orders for [employer].                     Sincerely,        DAYSI Marquez CNP    "

## 2019-04-11 ENCOUNTER — NURSING HOME VISIT (OUTPATIENT)
Dept: GERIATRICS | Facility: CLINIC | Age: 84
End: 2019-04-11
Payer: MEDICARE

## 2019-04-11 ENCOUNTER — ONCOLOGY VISIT (OUTPATIENT)
Dept: ONCOLOGY | Facility: CLINIC | Age: 84
End: 2019-04-11
Attending: INTERNAL MEDICINE
Payer: MEDICARE

## 2019-04-11 ENCOUNTER — INFUSION THERAPY VISIT (OUTPATIENT)
Dept: INFUSION THERAPY | Facility: CLINIC | Age: 84
End: 2019-04-11
Attending: INTERNAL MEDICINE
Payer: MEDICARE

## 2019-04-11 ENCOUNTER — HOSPITAL ENCOUNTER (OUTPATIENT)
Facility: CLINIC | Age: 84
Setting detail: SPECIMEN
Discharge: HOME | End: 2019-04-11
Attending: PHYSICIAN ASSISTANT | Admitting: PHYSICIAN ASSISTANT
Payer: MEDICARE

## 2019-04-11 VITALS
OXYGEN SATURATION: 100 % | HEART RATE: 134 BPM | RESPIRATION RATE: 18 BRPM | SYSTOLIC BLOOD PRESSURE: 102 MMHG | WEIGHT: 125.8 LBS | BODY MASS INDEX: 20.93 KG/M2 | DIASTOLIC BLOOD PRESSURE: 69 MMHG | TEMPERATURE: 98.1 F

## 2019-04-11 VITALS
TEMPERATURE: 97.1 F | OXYGEN SATURATION: 96 % | SYSTOLIC BLOOD PRESSURE: 100 MMHG | BODY MASS INDEX: 20.89 KG/M2 | HEART RATE: 136 BPM | HEIGHT: 65 IN | RESPIRATION RATE: 18 BRPM | DIASTOLIC BLOOD PRESSURE: 67 MMHG | WEIGHT: 125.4 LBS

## 2019-04-11 DIAGNOSIS — C90.00 MULTIPLE MYELOMA NOT HAVING ACHIEVED REMISSION (H): ICD-10-CM

## 2019-04-11 DIAGNOSIS — I50.9 ACUTE ON CHRONIC CONGESTIVE HEART FAILURE, UNSPECIFIED HEART FAILURE TYPE (H): Primary | ICD-10-CM

## 2019-04-11 DIAGNOSIS — C90.00 MULTIPLE MYELOMA NOT HAVING ACHIEVED REMISSION (H): Primary | ICD-10-CM

## 2019-04-11 DIAGNOSIS — C79.51 CANCER, METASTATIC TO BONE (H): ICD-10-CM

## 2019-04-11 DIAGNOSIS — Z51.81 ENCOUNTER FOR THERAPEUTIC DRUG MONITORING: Primary | ICD-10-CM

## 2019-04-11 DIAGNOSIS — I48.0 PAROXYSMAL ATRIAL FIBRILLATION (H): ICD-10-CM

## 2019-04-11 DIAGNOSIS — Z79.01 LONG TERM (CURRENT) USE OF ANTICOAGULANTS: ICD-10-CM

## 2019-04-11 LAB
ALBUMIN SERPL-MCNC: 3.3 G/DL (ref 3.4–5)
ANION GAP SERPL CALCULATED.3IONS-SCNC: 6 MMOL/L (ref 3–14)
BASOPHILS # BLD AUTO: 0 10E9/L (ref 0–0.2)
BASOPHILS NFR BLD AUTO: 0.1 %
BUN SERPL-MCNC: 29 MG/DL (ref 7–30)
CALCIUM SERPL-MCNC: 9.1 MG/DL (ref 8.5–10.1)
CHLORIDE SERPL-SCNC: 108 MMOL/L (ref 94–109)
CO2 SERPL-SCNC: 24 MMOL/L (ref 20–32)
CREAT SERPL-MCNC: 0.69 MG/DL (ref 0.52–1.04)
DIFFERENTIAL METHOD BLD: ABNORMAL
EOSINOPHIL # BLD AUTO: 0 10E9/L (ref 0–0.7)
EOSINOPHIL NFR BLD AUTO: 0 %
ERYTHROCYTE [DISTWIDTH] IN BLOOD BY AUTOMATED COUNT: 14.6 % (ref 10–15)
GFR SERPL CREATININE-BSD FRML MDRD: 78 ML/MIN/{1.73_M2}
GLUCOSE SERPL-MCNC: 173 MG/DL (ref 70–99)
HCT VFR BLD AUTO: 36 % (ref 35–47)
HGB BLD-MCNC: 11.9 G/DL (ref 11.7–15.7)
IMM GRANULOCYTES # BLD: 0.1 10E9/L (ref 0–0.4)
IMM GRANULOCYTES NFR BLD: 0.7 %
LYMPHOCYTES # BLD AUTO: 1 10E9/L (ref 0.8–5.3)
LYMPHOCYTES NFR BLD AUTO: 6 %
MCH RBC QN AUTO: 31.3 PG (ref 26.5–33)
MCHC RBC AUTO-ENTMCNC: 33.1 G/DL (ref 31.5–36.5)
MCV RBC AUTO: 95 FL (ref 78–100)
MONOCYTES # BLD AUTO: 2 10E9/L (ref 0–1.3)
MONOCYTES NFR BLD AUTO: 12.6 %
NEUTROPHILS # BLD AUTO: 12.7 10E9/L (ref 1.6–8.3)
NEUTROPHILS NFR BLD AUTO: 80.6 %
NRBC # BLD AUTO: 0 10*3/UL
NRBC BLD AUTO-RTO: 0 /100
PLATELET # BLD AUTO: 243 10E9/L (ref 150–450)
POTASSIUM SERPL-SCNC: 4.7 MMOL/L (ref 3.4–5.3)
RBC # BLD AUTO: 3.8 10E12/L (ref 3.8–5.2)
SODIUM SERPL-SCNC: 138 MMOL/L (ref 133–144)
WBC # BLD AUTO: 15.7 10E9/L (ref 4–11)

## 2019-04-11 PROCEDURE — 25000128 H RX IP 250 OP 636: Mod: JW | Performed by: INTERNAL MEDICINE

## 2019-04-11 PROCEDURE — 80048 BASIC METABOLIC PNL TOTAL CA: CPT | Performed by: PHYSICIAN ASSISTANT

## 2019-04-11 PROCEDURE — 96365 THER/PROPH/DIAG IV INF INIT: CPT

## 2019-04-11 PROCEDURE — 25800030 ZZH RX IP 258 OP 636: Performed by: INTERNAL MEDICINE

## 2019-04-11 PROCEDURE — 82040 ASSAY OF SERUM ALBUMIN: CPT | Performed by: PHYSICIAN ASSISTANT

## 2019-04-11 PROCEDURE — 99214 OFFICE O/P EST MOD 30 MIN: CPT | Performed by: INTERNAL MEDICINE

## 2019-04-11 PROCEDURE — 85025 COMPLETE CBC W/AUTO DIFF WBC: CPT | Performed by: INTERNAL MEDICINE

## 2019-04-11 PROCEDURE — 96401 CHEMO ANTI-NEOPL SQ/IM: CPT

## 2019-04-11 PROCEDURE — 99308 SBSQ NF CARE LOW MDM 20: CPT | Performed by: NURSE PRACTITIONER

## 2019-04-11 RX ORDER — METHYLPREDNISOLONE SODIUM SUCCINATE 125 MG/2ML
125 INJECTION, POWDER, LYOPHILIZED, FOR SOLUTION INTRAMUSCULAR; INTRAVENOUS
Status: CANCELLED
Start: 2019-04-11

## 2019-04-11 RX ORDER — ACYCLOVIR 400 MG/1
400 TABLET ORAL
Qty: 60 TABLET | Refills: 1 | Status: SHIPPED | OUTPATIENT
Start: 2019-04-11 | End: 2019-06-05

## 2019-04-11 RX ORDER — HEPARIN SODIUM (PORCINE) LOCK FLUSH IV SOLN 100 UNIT/ML 100 UNIT/ML
5 SOLUTION INTRAVENOUS EVERY 8 HOURS
Status: CANCELLED
Start: 2019-04-11

## 2019-04-11 RX ORDER — LORAZEPAM 2 MG/ML
0.5 INJECTION INTRAMUSCULAR EVERY 4 HOURS PRN
Status: CANCELLED
Start: 2019-04-11

## 2019-04-11 RX ORDER — DIPHENHYDRAMINE HYDROCHLORIDE 50 MG/ML
50 INJECTION INTRAMUSCULAR; INTRAVENOUS
Status: CANCELLED
Start: 2019-04-11

## 2019-04-11 RX ORDER — ALBUTEROL SULFATE 90 UG/1
1-2 AEROSOL, METERED RESPIRATORY (INHALATION)
Status: CANCELLED
Start: 2019-04-11

## 2019-04-11 RX ORDER — OXYCODONE HYDROCHLORIDE 5 MG/1
5 TABLET ORAL EVERY 4 HOURS PRN
Qty: 30 TABLET | Refills: 0 | Status: SHIPPED | OUTPATIENT
Start: 2019-04-11 | End: 2019-04-24

## 2019-04-11 RX ORDER — EPINEPHRINE 0.3 MG/.3ML
0.3 INJECTION SUBCUTANEOUS EVERY 5 MIN PRN
Status: CANCELLED | OUTPATIENT
Start: 2019-04-11

## 2019-04-11 RX ORDER — ACYCLOVIR 400 MG/1
400 TABLET ORAL DAILY
Qty: 30 TABLET | Refills: 1 | Status: SHIPPED | OUTPATIENT
Start: 2019-04-11 | End: 2019-04-11

## 2019-04-11 RX ORDER — SODIUM CHLORIDE 9 MG/ML
1000 INJECTION, SOLUTION INTRAVENOUS CONTINUOUS PRN
Status: CANCELLED
Start: 2019-04-11

## 2019-04-11 RX ORDER — EPINEPHRINE 1 MG/ML
0.3 INJECTION, SOLUTION INTRAMUSCULAR; SUBCUTANEOUS EVERY 5 MIN PRN
Status: CANCELLED | OUTPATIENT
Start: 2019-04-11

## 2019-04-11 RX ORDER — ALBUTEROL SULFATE 0.83 MG/ML
2.5 SOLUTION RESPIRATORY (INHALATION)
Status: CANCELLED | OUTPATIENT
Start: 2019-04-11

## 2019-04-11 RX ORDER — MEPERIDINE HYDROCHLORIDE 25 MG/ML
25 INJECTION INTRAMUSCULAR; INTRAVENOUS; SUBCUTANEOUS EVERY 30 MIN PRN
Status: CANCELLED | OUTPATIENT
Start: 2019-04-11

## 2019-04-11 RX ADMIN — BORTEZOMIB 2.1 MG: 3.5 INJECTION, POWDER, LYOPHILIZED, FOR SOLUTION INTRAVENOUS; SUBCUTANEOUS at 12:14

## 2019-04-11 RX ADMIN — ZOLEDRONIC ACID 3.5 MG: 0.8 INJECTION, SOLUTION, CONCENTRATE INTRAVENOUS at 12:12

## 2019-04-11 ASSESSMENT — MIFFLIN-ST. JEOR: SCORE: 1009.69

## 2019-04-11 ASSESSMENT — PAIN SCALES - GENERAL: PAINLEVEL: SEVERE PAIN (6)

## 2019-04-11 NOTE — PATIENT INSTRUCTIONS
Continue chemotherapy.  See Todd Loya in 2 weeks.  See me in 1 month.    Patient back in [facility]

## 2019-04-11 NOTE — PROGRESS NOTES
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     1. On 09/21/2015, WBC of 4.2, hemoglobin of 13.2 and platelets of 138.    -On 09/29/2015, SPEP does not reveal any M-spike.   -On 10/02/2015, JANET does not reveal any monoclonal protein.     -On 10/22/2015, urine immunofixation reveals monoclonal free kappa light chain.    2. On 05/11/2016, kappa light chain of 50, lambda light chain of 0.32 and ratio of kappa to lambda of 156.2.  3. Bone marrow biopsy on 05/25/2016 reveals 40-50% kappa light chain restricted plasma cells.  Cytogenetics is normal. FISH panel reveals translocation 11;14.    4. MRI of bones on 06/21/2016 and 06/22/2016 reveals myeloma lesions.  5. On 08/24/2016, she was started on revlimid with dexamethasone 20 mg weekly. She did not have any significant response to treatment.   6. Velcade and dexamethasone started on 03/21/2017.    7. Daratumumab added to velcade and dexamethasone on 05/31/2017.   -Velcade given every 14 days starting 08/01/2018.    SUBJECTIVE:  Ms. Arreola is an 86-year-old female with kappa free light chain multiple myeloma on Velcade, daratumumab and dexamethasone.  The patient was recently admitted to the hospital for congestive heart failure.  The patient is currently in [facility] and will be going home today.      Overall, her condition is stable.  She has some fatigue.  She has chronic back pain.  No worsening of any pain.  Her shortness of breath has improved with diuresis.  Her pedal edema has improved.      No headache.  No dizziness.  No chest pain.  Shortness of breath is better.  No nausea or vomiting.  No bleeding.  Appetite good.      PHYSICAL EXAMINATION:   Alert and oriented x 3. ECOG PS of 2.  EYES:  No icterus.   THROAT:  No ulcer or thrush.   NECK:  Supple. No lymphadenopathy.   AXILLAE:  No lymphadenopathy.   LUNGS:  Good air entry bilaterally.  No crackles or wheezing.   HEART:   Regular.  No murmur.   ABDOMEN:  Soft and nontender.  No mass.   EXTREMITIES: Bilateral pedal edema, improving. No calf swelling or tenderness.   SKIN: No petechia.     LABORATORY DATA:  Reviewed.      ASSESSMENT:    1.  An 86-year-old female with kappa free light chain multiple myeloma on Velcade, daratumumab and dexamethasone.  Disease has responded to it.   2.  Chronic back pain.   3.  Fatigue.   4.  Recent hospitalization for congestive heart failure.      PLAN:   1.  The patient clinically is stable.  Symptomatically she is better.  Shortness of breath is better.  Discussed regarding myeloma treatment.  Today, she will get Velcade and dexamethasone.  For convenience, we are giving Velcade every 2 weeks, which will be continued.  She will continue weekly dexamethasone.  Daratumumab once a month will be continued.  She is tolerating it well.   2.  The patient will continue on oxycodone for pain.  It is controlling her pain.   3.  I will see her in a month.  In between, she will see our nurse practitioner.  I advised her to see a physician sooner if she has worsening weakness, worsening shortness of breath, worsening pain, infection or any other concerns.         ITZ LOPEZ MD             D: 2019   T: 2019   MT: OLESYA      Name:     ALBERTO PARSON   MRN:      [id number]        Account:      [id number]   :      1932           Visit Date:   2019      Document: [id number]

## 2019-04-11 NOTE — PROGRESS NOTES
[facility]. I reviewed chart today, pt is discharging to home today with home care. I called  Home Care, spoke with Angelina RN. She is not assigned to pt, but she did take an order for lab to be done next week. VORB: BMP to be drawn per Dr. Rivera (need an MD to order/sign) on 4/15 or 4/16 and results sent to CORE Team 2. Message sent to Team RN board to watch for results. Pt has OV on 4/17 with ANIRUDH Means CNP. Cinthya Landeros RN on 4/11/2019 at 8:22 AM

## 2019-04-11 NOTE — LETTER
4/11/2019         RE: Amira Arreola  [address]  [place] [facility] [address]        Dear Colleague,    Thank you for referring your patient, Amira Arreola, to the [facility]. Please see a copy of my visit note below.    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     1. On 09/21/2015, WBC of 4.2, hemoglobin of 13.2 and platelets of 138.    -On 09/29/2015, SPEP does not reveal any M-spike.   -On 10/02/2015, JANET does not reveal any monoclonal protein.     -On 10/22/2015, urine immunofixation reveals monoclonal free kappa light chain.    2. On 05/11/2016, kappa light chain of 50, lambda light chain of 0.32 and ratio of kappa to lambda of 156.2.  3. Bone marrow biopsy on 05/25/2016 reveals 40-50% kappa light chain restricted plasma cells.  Cytogenetics is normal. FISH panel reveals translocation 11;14.    4. MRI of bones on 06/21/2016 and 06/22/2016 reveals myeloma lesions.  5. On 08/24/2016, she was started on revlimid with dexamethasone 20 mg weekly. She did not have any significant response to treatment.   6. Velcade and dexamethasone started on 03/21/2017.    7. Daratumumab added to velcade and dexamethasone on 05/31/2017.   -Velcade given every 14 days starting 08/01/2018.    SUBJECTIVE:  Ms. Arreola is an 86-year-old female with kappa free light chain multiple myeloma on Velcade, daratumumab and dexamethasone.  The patient was recently admitted to the hospital for congestive heart failure.  The patient is currently in [facility] and will be going home today.      Overall, her condition is stable.  She has some fatigue.  She has chronic back pain.  No worsening of any pain.  Her shortness of breath has improved with diuresis.  Her pedal edema has improved.      No headache.  No dizziness.  No chest pain.  Shortness of breath is better.  No nausea or vomiting.  No bleeding.  Appetite good.       PHYSICAL EXAMINATION:   Alert and oriented x 3. ECOG PS of 2.  EYES:  No icterus.   THROAT:  No ulcer or thrush.   NECK:  Supple. No lymphadenopathy.   AXILLAE:  No lymphadenopathy.   LUNGS:  Good air entry bilaterally.  No crackles or wheezing.   HEART:  Regular.  No murmur.   ABDOMEN:  Soft and nontender.  No mass.   EXTREMITIES: Bilateral pedal edema, improving. No calf swelling or tenderness.   SKIN: No petechia.     LABORATORY DATA:  Reviewed.      ASSESSMENT:    1.  An 86-year-old female with kappa free light chain multiple myeloma on Velcade, daratumumab and dexamethasone.  Disease has responded to it.   2.  Chronic back pain.   3.  Fatigue.   4.  Recent hospitalization for congestive heart failure.      PLAN:   1.  The patient clinically is stable.  Symptomatically she is better.  Shortness of breath is better.  Discussed regarding myeloma treatment.  Today, she will get Velcade and dexamethasone.  For convenience, we are giving Velcade every 2 weeks, which will be continued.  She will continue weekly dexamethasone.  Daratumumab once a month will be continued.  She is tolerating it well.   2.  The patient will continue on oxycodone for pain.  It is controlling her pain.   3.  I will see her in a month.  In between, she will see our nurse practitioner.  I advised her to see a physician sooner if she has worsening weakness, worsening shortness of breath, worsening pain, infection or any other concerns.         ITZ LOPEZ MD             D: 2019   T: 2019   MT: OLESYA      Name:     ALBERTO PARSON   MRN:      [id number]        Account:      [id number]   :      1932           Visit Date:   2019      Document: [id number]        Again, thank you for allowing me to participate in the care of your patient.        Sincerely,        Itz Lopez MD

## 2019-04-11 NOTE — PROGRESS NOTES
Infusion Nursing Note:  Amira Arreola presents today for C24d15 Velcade, Zometa.    Patient seen by provider today: Yes: Dr. Roberts    Note: Patient reports increased fatigue and weakness.  Is currently staying at a outpatient facility.  Currently rates back pain at 6/10 but takes prn Oxycodone helps control her pain.  Tolerated today's infusion and injection well.    Intravenous Access:  Implanted Port.      Treatment Conditions:  Lab Results   Component Value Date    HGB 11.9 04/11/2019     Lab Results   Component Value Date    WBC 15.7 04/11/2019      Lab Results   Component Value Date    ANEU 12.7 04/11/2019     Lab Results   Component Value Date     04/11/2019      Lab Results   Component Value Date     04/11/2019                   Lab Results   Component Value Date    POTASSIUM 4.7 04/11/2019           Lab Results   Component Value Date    MAG 2.0 03/22/2019            Lab Results   Component Value Date    CR 0.69 04/11/2019                   Lab Results   Component Value Date    BRADLEY 9.1 04/11/2019                Lab Results   Component Value Date    BILITOTAL 0.6 03/13/2019           Lab Results   Component Value Date    ALBUMIN 3.3 04/11/2019                    Lab Results   Component Value Date    ALT 68 03/13/2019           Lab Results   Component Value Date    AST 34 03/13/2019       Results reviewed, labs MET treatment parameters, ok to proceed with treatment.      Post Infusion Assessment:  Patient tolerated infusion without incident.  Patient tolerated one Velcade injection without incident to right abdomen.  Blood return noted pre and post infusion.  Site patent and intact, free from redness, edema or discomfort.  No evidence of extravasations.  Access discontinued per protocol.    Discharge Plan:   Prescription refills given for Acyclovir and Oxycodone.  Discharge instructions reviewed with: Patient and Family.  Patient and/or family verbalized understanding of discharge instructions  and all questions answered.  Copy of AVS reviewed with patient and/or family.  Patient will return 4/24/19 for next appointment.  Patient discharged in stable condition accompanied by: son.  Departure Mode: Wheelchair.    Libby Nguyễn RN

## 2019-04-12 ENCOUNTER — DOCUMENTATION ONLY (OUTPATIENT)
Dept: CARE COORDINATION | Facility: CLINIC | Age: 84
End: 2019-04-12

## 2019-04-12 NOTE — PROGRESS NOTES
[facility] now requests orders and shares plan of care/discharge summaries for some patients through [employer].  Please REPLY TO THIS MESSAGE OR ROUTE BACK TO THE AUTHOR in order to give authorization for orders when needed.  This is considered a verbal order, you will still receive a faxed copy of orders for signature.  Thank you for your assistance in improving collaboration for our patients.    ORDER  SN 2x/week x2, 1x/week x1, 3 PRN with first routine visit week of 4/15.  INR recheck on 4/18, current coumadin dosing is 4mg Sat and Tue and 2mg all other days  BMP recommended 4/15, advise if ok for HC to draw or to be done at clinic appt next week.  PT and OT evals  HA 2x/week x3, visits start week of 4/15    MD SUMMARY/PLAN OF CARE  SUMMARY TO MD  SITUATION: Patient reassessed for homecare services following inpatient stay related to diastolic CHF exaserbation.  She was hosptialized from 3/22 and transferred to TCU on 3/28 and discharged home on 4/11.  Today BP  92/62, R 16, HR 73, O2 99% on RA, 0/10 pain but has pain that is present with activity to midback, T 97.9 temporal, weight 126 lbs.  She has excoration to coccyx that is drying up otherwise no skin issues noted.  She is up with SBA and walker for safety, gait is unsteady at times and noted to furniture walk at times.  She was noted to have medication changes during inpatient stay.  Daughter currently manages medications, all medications in home except lasix and daughter has message out to have script sent to pharmacy.  INR on 4/11 per paperwork in home was 1.7, goal is 2-3, current dosing is 4mg Saturday and Tuesdays and 2mg all other days.  She resides at home with spouse, has support from family and private hired homemaker/home health aide.  Per discharge paperwork INR due 4/18 and BMP recommended 4/15.  BACKGROUND: PMI includes Diastolic CHF exaserbation, AFib w/RVR, bilateral LE edema, HTN, multiple mylenoma w/mets to bone, Closed  compression fracture of thoracic vertebra with delayed healing  ANALYSIS: At high risk for rehospialization due to comorbidities/medical complexity, high falls risk.  RECOMMENDATION: SN for education and assessment related to disease management, medications, safety.  PT evals for strengthening, gait/mobility.  OT eval for ADL/IADL safety and DME needs.  HA for bathing assistance.  SW declined.

## 2019-04-15 ENCOUNTER — TELEPHONE (OUTPATIENT)
Dept: FAMILY MEDICINE | Facility: CLINIC | Age: 84
End: 2019-04-15

## 2019-04-15 ENCOUNTER — TELEPHONE (OUTPATIENT)
Dept: GERIATRICS | Facility: CLINIC | Age: 84
End: 2019-04-15

## 2019-04-15 ENCOUNTER — OFFICE VISIT (OUTPATIENT)
Dept: FAMILY MEDICINE | Facility: CLINIC | Age: 84
End: 2019-04-15
Payer: MEDICARE

## 2019-04-15 ENCOUNTER — CARE COORDINATION (OUTPATIENT)
Dept: CARDIOLOGY | Facility: CLINIC | Age: 84
End: 2019-04-15

## 2019-04-15 VITALS
WEIGHT: 125 LBS | OXYGEN SATURATION: 99 % | DIASTOLIC BLOOD PRESSURE: 71 MMHG | TEMPERATURE: 97.4 F | SYSTOLIC BLOOD PRESSURE: 110 MMHG | BODY MASS INDEX: 20.83 KG/M2 | HEART RATE: 81 BPM | HEIGHT: 65 IN

## 2019-04-15 DIAGNOSIS — I50.31 ACUTE DIASTOLIC HEART FAILURE (H): Primary | ICD-10-CM

## 2019-04-15 DIAGNOSIS — I10 BENIGN ESSENTIAL HYPERTENSION: ICD-10-CM

## 2019-04-15 DIAGNOSIS — C79.51 CANCER, METASTATIC TO BONE (H): ICD-10-CM

## 2019-04-15 DIAGNOSIS — D69.6 THROMBOCYTOPENIA (H): ICD-10-CM

## 2019-04-15 DIAGNOSIS — I48.0 PAROXYSMAL ATRIAL FIBRILLATION (H): ICD-10-CM

## 2019-04-15 DIAGNOSIS — I27.20 PULMONARY HYPERTENSION (H): ICD-10-CM

## 2019-04-15 DIAGNOSIS — I77.810 ASCENDING AORTA DILATATION (H): ICD-10-CM

## 2019-04-15 DIAGNOSIS — C90.00 MULTIPLE MYELOMA NOT HAVING ACHIEVED REMISSION (H): ICD-10-CM

## 2019-04-15 LAB
ANION GAP SERPL CALCULATED.3IONS-SCNC: 6 MMOL/L (ref 3–14)
BUN SERPL-MCNC: 22 MG/DL (ref 7–30)
CALCIUM SERPL-MCNC: 9.8 MG/DL (ref 8.5–10.1)
CHLORIDE SERPL-SCNC: 108 MMOL/L (ref 94–109)
CO2 SERPL-SCNC: 25 MMOL/L (ref 20–32)
CREAT SERPL-MCNC: 0.89 MG/DL (ref 0.52–1.04)
GFR SERPL CREATININE-BSD FRML MDRD: 58 ML/MIN/{1.73_M2}
GLUCOSE SERPL-MCNC: 148 MG/DL (ref 70–99)
POTASSIUM SERPL-SCNC: 4.5 MMOL/L (ref 3.4–5.3)
SODIUM SERPL-SCNC: 139 MMOL/L (ref 133–144)

## 2019-04-15 PROCEDURE — 80048 BASIC METABOLIC PNL TOTAL CA: CPT | Performed by: INTERNAL MEDICINE

## 2019-04-15 PROCEDURE — 99495 TRANSJ CARE MGMT MOD F2F 14D: CPT | Performed by: INTERNAL MEDICINE

## 2019-04-15 ASSESSMENT — MIFFLIN-ST. JEOR: SCORE: 1007.88

## 2019-04-15 NOTE — PROGRESS NOTES
SUBJECTIVE:   Amira Arreola is a 86 year old female who presents to clinic today for the following   health issues:          Hospital Follow-up Visit:    Hospital/Nursing Home/IP Rehab Facility: [facility]   Date of Admission: 03/23/2019 - 3/28/19 at [facility], then to tcu  Date of Discharge: 04/11/2019 from tcu  Reason(s) for Admission:diastolic heart failure and afib with rvr            Problems taking medications regularly:  None       Medication changes since discharge: None       Problems adhering to non-medication therapy:  None    Summary of hospitalization:  [place] hospital discharge summary reviewed and tcu discontinue note reviewed  Diagnostic Tests/Treatments reviewed.  Follow up needed: with specialists  Other Healthcare Providers Involved in Patient s Care:cardiology           Update since discharge: improved.     Post Discharge Medication Reconciliation: discharge medications reconciled, continue medications without change.  Plan of care communicated with patient and family     Coding guidelines for this visit:  Type of Medical   Decision Making Face-to-Face Visit       within 7 Days of discharge Face-to-Face Visit        within 14 days of discharge   Moderate Complexity [id number]   High Complexity [id number]          The patient presents for hospital than TCU follow-up.  I reviewed both discharge summary as well as hospital notes and labs.    The patient was hospitalized for acute diastolic heart failure with rapid atrial fibrillation.  A cardiac echo showed normal EF with no wall motion abnormalities with 2+ MR and TR and severe pulmonary hypertension.  The patient was diuresed and sent out and her meds were adjusted.  She was in the TCU until April 11.  She presents with her  and daughter.    At this time she is doing quite well.  Her weight has been checked daily and has been stable.  She is watching her salt intake.  She does not have chest pain or shortness of breath, PND or  significant edema.  No coughs or fevers or night sweats.  No GI or  symptoms.  She has regular cardiology and oncology follow-up.    Past Medical History:   Diagnosis Date     Abnormal CXR     then ct done and not significant     Acute diastolic heart failure (H) 2019    nl ef, 2+mr and tr with severe pulm htn     Ascending aorta dilatation (H) 2016    on echo, mild, fu  4.0, slightly larger     Cancer, metastatic to bone (H)     due to myeloma     Colonic polyp     adenomatous, fu  tics only     Compression fracture     multiple areas of spine     Dry eyes      Elevated MCV     b12 and folic acid nl     HTN (hypertension)     off meds for years     Lung nodule 2018    on ct, 4mm, ct done for fu abnl cxr     Menorrhagia     hysteroscopy and d and c done     MGUS (monoclonal gammopathy of unknown significance)     eval by Dr. Roberts     Multiple myeloma (H)     dx  at [place], bone lesions seen on mri      OAB (overactive bladder)     Dr. Grullon     Osteoporosis     fu done  and stable, went off meds then, fu done ; has had gyn fu and added evista 2013 by gyn     Palpitations     nl echo, mildly dilated asc aorta     Paroxysmal atrial fibrillation (H)     had palp and ziopatch showed it, echo nl lv fxn, mild mr and tr, added coum and toprol, toprol dose raised 16     Pulmonary hypertension (H) 2019    seen on echo     Sciatica of left side     Dr. Helen Ricardo      SVT (supraventricular tachycardia) (H)     on ziopatch     Thrombocytopenia (H)      Past Surgical History:   Procedure Laterality Date     BONE MARROW BIOPSY, BONE SPECIMEN, NEEDLE/TROCAR N/A 2016    Procedure: BIOPSY BONE MARROW;  Surgeon: Bryan Patel MD;  Location:  GI     CATARACT IOL, RT/LT        SECTION  ,      COLONOSCOPY  2013    Procedure: COLONOSCOPY;  COLONOSCOPY;  Surgeon: Steffany Rockwell  MD Leah;  Location:  GI     EXCISE EXOSTOSIS TIBIA / FIBULA  6/30/2014    Procedure: EXCISE EXOSTOSIS TIBIA / FIBULA;  Surgeon: Naila Pichardo MD;  Location:  SD     hysteroscopy and d and c  2002    due to bleeding     left anle replacement  2007     right ankle surgery  2003     Social History     Socioeconomic History     Marital status:      Spouse name: Tom     Number of children: 6     Years of education: Not on file     Highest education level: Not on file   Occupational History     Occupation: rn anesthetist     Employer: RETIRED   Social Needs     Financial resource strain: Not on file     Food insecurity:     Worry: Not on file     Inability: Not on file     Transportation needs:     Medical: Not on file     Non-medical: Not on file   Tobacco Use     Smoking status: Never Smoker     Smokeless tobacco: Never Used   Substance and Sexual Activity     Alcohol use: No     Alcohol/week: 0.0 oz     Drug use: No     Sexual activity: Never   Lifestyle     Physical activity:     Days per week: Not on file     Minutes per session: Not on file     Stress: Not on file   Relationships     Social connections:     Talks on phone: Not on file     Gets together: Not on file     Attends Yazdanism service: Not on file     Active member of club or organization: Not on file     Attends meetings of clubs or organizations: Not on file     Relationship status: Not on file     Intimate partner violence:     Fear of current or ex partner: Not on file     Emotionally abused: Not on file     Physically abused: Not on file     Forced sexual activity: Not on file   Other Topics Concern     Parent/sibling w/ CABG, MI or angioplasty before 65F 55M? Not Asked   Social History Narrative     Not on file     Current Outpatient Medications   Medication Sig Dispense Refill     Acetaminophen (TYLENOL PO) Take 500 mg by mouth every 6 hours as needed for mild pain or fever       acyclovir (ZOVIRAX) 400 MG tablet Take 1  tablet (400 mg) by mouth 2 times daily 60 tablet 1     Carboxymethylcellulose Sod PF (REFRESH PLUS) 0.5 % SOLN ophthalmic solution 1 drop 4 times daily as needed for dry eyes       Dexamethasone (DECADRON PO) Take by mouth See Admin Instructions Give 20 mg every wed.       diltiazem ER (DILT-XR) 120 MG 24 hr capsule Take 1 capsule (120 mg) by mouth daily 30 capsule 1     furosemide (LASIX) 40 MG tablet Take 1 tablet (40 mg) by mouth daily 60 tablet 1     latanoprost (XALATAN) 0.005 % ophthalmic solution Place 1 drop into the right eye daily   6     lidocaine-prilocaine (EMLA) cream Apply to port site 1 hour prior to access 30 g 1     metoprolol succinate ER (TOPROL-XL) 50 MG 24 hr tablet Take 3 tablets (150 mg) by mouth 2 times daily 180 tablet 3     Multiple Vitamin (DAILY MULTIVITAMIN PO) Take 1 tablet by mouth daily.       oxyCODONE (ROXICODONE) 5 MG tablet Take 1 tablet (5 mg) by mouth every 4 hours as needed for pain maximum 4 tablet(s) per day 30 tablet 0     polyethylene glycol (MIRALAX/GLYCOLAX) powder Take 17 g by mouth daily as needed        potassium chloride (KLOR-CON) 20 MEQ packet Take 20 mEq by mouth 2 times daily 60 packet 1     prochlorperazine (COMPAZINE) 10 MG tablet Take 1 tablet (10 mg) by mouth every 6 hours as needed for nausea or vomiting 30 tablet 1     SIMBRINZA 1-0.2 % ophthalmic suspension Place 1 drop into the right eye 2 times daily 1 drop AM and PM  2     Sodium Fluoride (SF 5000 PLUS) 1.1 % CREA Apply to affected area 3 times daily       vitamin D3 (VITAMIN D3) 1000 units (25 mcg) tablet Take 1,000 Units by mouth daily       warfarin (COUMADIN) 4 MG tablet Take 4 mg by mouth daily Saturday and Tuesday       warfarin (COUMADIN) 4 MG tablet Take 2 mg by mouth daily Sunday, Monday, Wednesday, Thursday, Friday        Allergies   Allergen Reactions     Blood Transfusion Related (Informational Only)      Blood antigens related to receiving Darzelex-AG     Penicillin [Penicillins] Rash      "Blotches on chest      FAMILY HISTORY NOTED AND REVIEWED    REVIEW OF SYSTEMS: above    PHYSICAL EXAM    /71 (BP Location: Left arm, Patient Position: Sitting, Cuff Size: Adult Regular)   Pulse 81   Temp 97.4  F (36.3  C) (Tympanic)   Ht 1.651 m (5' 5\")   Wt 56.7 kg (125 lb)   SpO2 99%   Breastfeeding? No   BMI 20.80 kg/m      Patient appears non toxic  Lungs clear  cv reglar rate and rhythm 2/6 sm, no jvp, trace pitting edema bilat  Abdomen normal active bowel sounds, soft non-tender    Labs noted    ASSESSMENT:  1. diast chf, acute, resolved  2. afib with rvr, stable and reg now  3. Myeloma  4. 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 pulm hypertension, stable  6. Low platelet, due to above  7. Hypertension, controlled    PLAN:  Daily weight  Call if increasing weight or shortness of breath  Follow up specialists  Call if I can help    Addy Frias M.D.        "

## 2019-04-15 NOTE — TELEPHONE ENCOUNTER
Returned call. OK'd requested orders.  Orders will be faxed to PCP for review and signature.   Bri Mcbride RN

## 2019-04-15 NOTE — TELEPHONE ENCOUNTER
Reason for Call:  Home Health Care    yvrose with [facility] called regarding (reason for call): Orders    Orders are needed for this patient.  Ok for a late re certification due to TCU  She is now home and need to resume Private Pay Home health aide for personal care    Pt Provider:     Phone Number Homecare Nurse can be reached at: [phone]    Can we leave a detailed message on this number? yes        Call taken on 4/15/2019 at 9:09 AM by Pacheco Villagran

## 2019-04-15 NOTE — TELEPHONE ENCOUNTER
Prior Authorization Retail Medication Request    Medication/Dose: DILTIAZEM ER 120MG CAPSULES (24HR)  ICD code (if different than what is on RX):    Previously Tried and Failed:    Rationale:      Insurance Name:    Insurance ID:        Pharmacy Information (if different than what is on RX)  Name:  [facility]   Phone:  [phone]

## 2019-04-16 DIAGNOSIS — I48.0 PAROXYSMAL ATRIAL FIBRILLATION (H): ICD-10-CM

## 2019-04-16 DIAGNOSIS — Z79.01 LONG TERM CURRENT USE OF ANTICOAGULANT THERAPY: ICD-10-CM

## 2019-04-16 NOTE — TELEPHONE ENCOUNTER
"warfarin (COUMADIN) 4 MG tablet  Last Written Prescription Date:  Historical   Last Fill Quantity: ,  # refills:    Last office visit: 4/15/2019 with prescribing provider:     Future Office Visit:   Next 5 appointments (look out 90 days)    Apr 24, 2019  1:00 PM CDT  Return Visit with DAYSI Villafana CNP  [facility] ([facility]  [address]  [facility] [address]  [phone]   May 08, 2019 10:00 AM CDT  Return Visit with Shayne Roberts MD  [facility] ([facility]  [address]  [facility] [address]  [phone]             Requested Prescriptions   Pending Prescriptions Disp Refills     warfarin (COUMADIN) 4 MG tablet [Pharmacy Med Name: WARFARIN SOD 4MG TABLETS (BLUE)] 180 tablet 0     Sig: TAKE ONE TO TWO TABLETS BY MOUTH EVERY DAY AS DIRECTED BY INR CLINIC       Vitamin K Antagonists Failed - 4/16/2019  3:56 AM        Failed - INR is within goal in the past 6 weeks     Confirm INR is within goal in the past 6 weeks.     Recent Labs   Lab Test 03/28/19  0532   INR 2.73*                       Passed - Recent (12 mo) or future (30 days) visit within the authorizing provider's specialty     Patient had office visit in the last 12 months or has a visit in the next 30 days with authorizing provider or within the authorizing provider's specialty.  See \"Patient Info\" tab in inbasket, or \"Choose Columns\" in Meds & Orders section of the refill encounter.              Passed - Medication is active on med list        Passed - Patient is 18 years of age or older        Passed - Patient is not pregnant        Passed - No positive pregnancy on file in past 12 months          "

## 2019-04-17 ENCOUNTER — OFFICE VISIT (OUTPATIENT)
Dept: CARDIOLOGY | Facility: CLINIC | Age: 84
End: 2019-04-17
Attending: NURSE PRACTITIONER
Payer: MEDICARE

## 2019-04-17 VITALS
SYSTOLIC BLOOD PRESSURE: 96 MMHG | WEIGHT: 128 LBS | HEIGHT: 65 IN | DIASTOLIC BLOOD PRESSURE: 60 MMHG | HEART RATE: 72 BPM | BODY MASS INDEX: 21.33 KG/M2 | OXYGEN SATURATION: 99 %

## 2019-04-17 DIAGNOSIS — I48.0 PAROXYSMAL ATRIAL FIBRILLATION (H): ICD-10-CM

## 2019-04-17 DIAGNOSIS — I10 BENIGN ESSENTIAL HYPERTENSION: ICD-10-CM

## 2019-04-17 DIAGNOSIS — I50.32 CHRONIC DIASTOLIC HEART FAILURE (H): Primary | ICD-10-CM

## 2019-04-17 PROCEDURE — 99214 OFFICE O/P EST MOD 30 MIN: CPT | Performed by: NURSE PRACTITIONER

## 2019-04-17 RX ORDER — WARFARIN SODIUM 4 MG/1
TABLET ORAL
Qty: 180 TABLET | Refills: 0 | Status: SHIPPED | OUTPATIENT
Start: 2019-04-17 | End: 2019-06-28

## 2019-04-17 ASSESSMENT — MIFFLIN-ST. JEOR: SCORE: 1021.48

## 2019-04-17 NOTE — LETTER
4/17/2019    Addy Frias MD  [address]  [facility] [address]    RE: Amira Arreloa       Dear Colleague,    I had the pleasure of seeing Amira Arreola in the [facility].    Cardiology Clinic Progress Note  Amira Arreola MRN# [id number]   YOB: 1932 Age: 86 year old   Primary Cardiologist: Dr. Rivera Reason for visit: CORE follow up            Assessment and Plan:   Amira Arreola is a very pleasant 86 year old female with a history of HFpEF, chronic atrial fibrillation, hypertension, and hx of multiple myeloma mets to bone (dx 2016, currently being treated with Daratumab, Velcade, and Dexamethasone every 2 weeks). Hospitalized 3/22/19-3/28/19 presented with dyspnea, leg swelling and intermittent palpitations. Patient was found to have atrial fibrillation with RVR and acute diastolic heart failure exacerbation. Patient discharged home from TCU on 4/11/19. Patient here today for CORE follow up.    1.  Chronic diastolic heart failure/HFpEF - Echocardiogram completed 3/23/19 LVEF 55-60%, no wall motion abnormalities, moderate mitral regurgitation, moderate tricuspid regurgitation, and severe pulmonary hypertension. Discharge weight 135#, weight today stable at 128#. Patient appears compensated and euvolemic. Labs from 4/15/19 show stable kidney function and electrolytes.    - NYHA class III, stage C   - Fluid status : euvolemic   - Diuretic regimen : furosemide to 40mg daily   - Aldosterone antagonist : will consider in the future if worsening symptoms   - Blood pressure : controlled   - Reinforced HF education, reviewed low sodium diet and reading labels today.    - Advised to call if weight >129#   - Currently monitoring weight closely at home and supportive/involved children that feel comfortable calling if weight increases, if difficulties with managing weight/fluids will enroll in telehealth tracker.     2. Chronic atrial fibrillation - stable,  asymptomatic, rate controlled, during hospitalization metoprolol succinate was increased to 150mg BID and started on diltiazem XR 120mg daily to achieve better rate control. This appears to be managing rate control well   - Continue diltiazem and metoprolol XL   - [id number] score 5 (HTN, HF, age, female)   - Continue warfarin for thromboembolic prophylaxis.     3. Hypertension - controlled, continue current medications.     4. Moderate mitral regurgitation, moderate tricuspid regurgitation   - Will consider repeat echocardiogram in the future now that patient is euvolemic.      5. Severe pulmonary hypertension - likely secondary to HFpEF and likely improved with diuresis.    - Will consider repeat echocardiogram in the future now that patient is euvolemic.     5. Multiple myeloma with mets to bone - follows with Dr. Roberts   - Started chemo therapy on 4/11/19, on 2 week cycles.         Changes today: none    Follow up plan:     CORE followup with Elisabeth in 1 month with labs prior         History of Presenting Illness:    Amira Arreola is a very pleasant 86 year old female with a history of HFpEF, chronic atrial fibrillation, hypertension, and hx of multiple myeloma mets to bone (dx 2016, currently being treated with Daratumab, Velcade, and Dexamethasone every 2 weeks).     Hospitalized 3/22/19-3/28/19 presented with dyspnea, leg swelling and intermittent palpitations. Patient was found to have atrial fibrillation with RVR and acute diastolic heart failure exacerbation. BNP elevated at 4828, CXR with bilateral moderate effusions with infiltrates vs. Atelectasis. Echocardiogram completed 3/23/19 LVEF 55-60%, no wall motion abnormalities, moderate mitral regurgitation, moderate tricuspid regurgitation, and severe pulmonary hypertension. Oral diltiazem started and metoprolol succinate escalated while hospitalized to help with rate control. Diuresed with IV furosemide and discharged on PO furosemide 40mg BID. Weight  3/24/19 147# (doesn't appear admission weight was completed). Discharge weight 135#. Discharged to TCU.     During last CORE visit on 4/9/19 patient was compensated and euvolemic. No medication changes were made and she was continued on furosemide 40mg daily. Patient discharged home from TCU on 4/11/19.     Patient is here today for CORE followup. Patient reports feeling good. Monitoring weights dailys at home Clinic weights stable, 128# today, has been ranging 125-129# since hospital discharge. Weight at home stable, today 127# (with shoes/clothes on). Denies lower extremity edema. Denies abdominal distention/bloating. Denies shortness of breath at rest. Denies exertional dyspnea with current levels of activity. Does note some fatigue after 1 flight of stairs, denies dyspnea. Sleeping good. Denies orthopnea or PND. Sleepign with 1 pillow. Report good energy levels. Patient denies chest pain or chest tightness. Denies dizziness, lightheadedness or other presyncopal symptoms. Denies tachycardia or palpitations. Denies bleeding episodes. Some complaints of chronic back pain. Restarted chemo therapy on 4/11/19 and now on 2 week cycles. HHC RN coming to the house and PT also coming.    Labs from 4/15/19 show stable kidney function, electrolytes stable. Blood pressure 96/60 and HR 72 in clinic today.     Appetite is good, feels she has been eating more since getting home. Son helps with dinner. Eating meals at home. Not adding salt to foods. Patient reports drinking < 2L daily. No tobacco or alcohol use. Living with her son at home and daughter living next door.         Recent Hospitalizations   3/22/19-3/28/19 presented with dyspnea, leg swelling and intermittent palpitations. Patient was found to have atrial fibrillation with RVR and acute diastolic heart failure exacerbation. Weight 3/24/19 147# (doesn't appear admission weight was completed). Discharge weight 135#.        Social History    , 6 children, Enjoys  keeping the house clean and orderly. Retired nurse.   Social History     Socioeconomic History     Marital status:      Spouse name: Tom     Number of children: 6     Years of education: Not on file     Highest education level: Not on file   Occupational History     Occupation: rn anesthetist     Employer: RETIRED   Social Needs     Financial resource strain: Not on file     Food insecurity:     Worry: Not on file     Inability: Not on file     Transportation needs:     Medical: Not on file     Non-medical: Not on file   Tobacco Use     Smoking status: Never Smoker     Smokeless tobacco: Never Used   Substance and Sexual Activity     Alcohol use: No     Alcohol/week: 0.0 oz     Drug use: No     Sexual activity: Never   Lifestyle     Physical activity:     Days per week: Not on file     Minutes per session: Not on file     Stress: Not on file   Relationships     Social connections:     Talks on phone: Not on file     Gets together: Not on file     Attends Rastafari service: Not on file     Active member of club or organization: Not on file     Attends meetings of clubs or organizations: Not on file     Relationship status: Not on file     Intimate partner violence:     Fear of current or ex partner: Not on file     Emotionally abused: Not on file     Physically abused: Not on file     Forced sexual activity: Not on file   Other Topics Concern     Parent/sibling w/ CABG, MI or angioplasty before 65F 55M? Not Asked   Social History Narrative     Not on file            Review of Systems:   Skin:  Positive for bruising   Eyes:  Positive for glasses  ENT:  Negative    Respiratory:  Negative    Cardiovascular:    Positive for;chest pain  Gastroenterology: Positive for constipation  Genitourinary:  Negative    Musculoskeletal:  Positive for arthritis  Neurologic:  Negative    Psychiatric:  Positive for anxiety  Heme/Lymph/Imm:  Positive for    Endocrine:  Negative           Physical Exam:   Vitals: BP 96/60    "Pulse 72   Ht 1.651 m (5' 5\")   Wt 58.1 kg (128 lb)   SpO2 99%   BMI 21.30 kg/m      Wt Readings from Last 4 Encounters:   04/17/19 58.1 kg (128 lb)   04/15/19 56.7 kg (125 lb)   04/11/19 57.1 kg (125 lb 12.8 oz)   04/11/19 56.9 kg (125 lb 6.4 oz)     GEN: well nourished, in no acute distress.  HEENT:  Pupils equal, round. Sclerae nonicteric.   NECK: Supple, no masses appreciated. No JVD appreciated on exam at 90 degrees  C/V:  Irregularly irregular rate and rhythm, no murmur, rub or gallop.   RESP: Respirations are unlabored. Clear bilaterally. No crackles, wheezes or rales.    GI: Abdomen distended, nontender.  EXTREM: Trace LE edema   NEURO: Alert and oriented, cooperative.  SKIN: Warm and dry.        Data:   ECHO 3/23/19  The left ventricle is normal in size.  The visual ejection fraction is estimated at 55-60%.  No regional wall motion abnormalities noted.  There is moderate (2+) mitral regurgitation.  There is moderate (2+) tricuspid regurgitation.  Severe (>55mmHg) pulmonary hypertension is present.  The rhythm was rapid atrial fibrillation.    LIPID RESULTS:  Lab Results   Component Value Date    CHOL 160 10/12/2016    HDL 76 10/12/2016    LDL 71 10/12/2016    TRIG 66 10/12/2016    CHOLHDLRATIO 2.3 09/21/2015     LIVER ENZYME RESULTS:  Lab Results   Component Value Date    AST 34 03/13/2019    ALT 68 (H) 03/13/2019     CBC RESULTS:  Lab Results   Component Value Date    WBC 15.7 (H) 04/11/2019    RBC 3.80 04/11/2019    HGB 11.9 04/11/2019    HCT 36.0 04/11/2019    MCV 95 04/11/2019    MCH 31.3 04/11/2019    MCHC 33.1 04/11/2019    RDW 14.6 04/11/2019     04/11/2019     BMP RESULTS:  Lab Results   Component Value Date     04/15/2019    POTASSIUM 4.5 04/15/2019    CHLORIDE 108 04/15/2019    CO2 25 04/15/2019    ANIONGAP 6 04/15/2019     (H) 04/15/2019    BUN 22 04/15/2019    CR 0.89 04/15/2019    GFRESTIMATED 58 (L) 04/15/2019    GFRESTBLACK 67 04/15/2019    BRADLEY 9.8 04/15/2019      INR " RESULTS:  Lab Results   Component Value Date    INR 2.73 (H) 03/28/2019    INR 2.55 (H) 03/27/2019            Medications     Current Outpatient Medications   Medication Sig Dispense Refill     Acetaminophen (TYLENOL PO) Take 500 mg by mouth every 6 hours as needed for mild pain or fever       acyclovir (ZOVIRAX) 400 MG tablet Take 1 tablet (400 mg) by mouth 2 times daily 60 tablet 1     Carboxymethylcellulose Sod PF (REFRESH PLUS) 0.5 % SOLN ophthalmic solution 1 drop 4 times daily as needed for dry eyes       Dexamethasone (DECADRON PO) Take by mouth See Admin Instructions Give 20 mg every wed.       diltiazem ER (DILT-XR) 120 MG 24 hr capsule Take 1 capsule (120 mg) by mouth daily 30 capsule 1     furosemide (LASIX) 40 MG tablet Take 1 tablet (40 mg) by mouth daily 60 tablet 1     latanoprost (XALATAN) 0.005 % ophthalmic solution Place 1 drop into the right eye daily   6     lidocaine-prilocaine (EMLA) cream Apply to port site 1 hour prior to access 30 g 1     metoprolol succinate ER (TOPROL-XL) 50 MG 24 hr tablet Take 3 tablets (150 mg) by mouth 2 times daily 180 tablet 3     Multiple Vitamin (DAILY MULTIVITAMIN PO) Take 1 tablet by mouth daily.       oxyCODONE (ROXICODONE) 5 MG tablet Take 1 tablet (5 mg) by mouth every 4 hours as needed for pain maximum 4 tablet(s) per day 30 tablet 0     polyethylene glycol (MIRALAX/GLYCOLAX) powder Take 17 g by mouth daily as needed        potassium chloride (KLOR-CON) 20 MEQ packet Take 20 mEq by mouth 2 times daily 60 packet 1     prochlorperazine (COMPAZINE) 10 MG tablet Take 1 tablet (10 mg) by mouth every 6 hours as needed for nausea or vomiting 30 tablet 1     SIMBRINZA 1-0.2 % ophthalmic suspension Place 1 drop into the right eye 2 times daily 1 drop AM and PM  2     Sodium Fluoride (SF 5000 PLUS) 1.1 % CREA Apply to affected area 3 times daily       vitamin D3 (VITAMIN D3) 1000 units (25 mcg) tablet Take 1,000 Units by mouth daily       warfarin (COUMADIN) 4 MG  tablet Take 4 mg by mouth daily Saturday and Tuesday       warfarin (COUMADIN) 4 MG tablet Take 2 mg by mouth daily , Monday, Wednesday, Thursday, Friday             Past Medical History     Past Medical History:   Diagnosis Date     Abnormal CXR     then ct done and not significant     Acute diastolic heart failure (H) 2019    nl ef, 2+mr and tr with severe pulm htn     Ascending aorta dilatation (H) 2016    on echo, mild, fu  4.0, slightly larger     Cancer, metastatic to bone (H)     due to myeloma     Colonic polyp     adenomatous, fu 2013 tics only     Compression fracture     multiple areas of spine     Dry eyes      Elevated MCV     b12 and folic acid nl     HTN (hypertension)     off meds for years     Lung nodule 2018    on ct, 4mm, ct done for fu abnl cxr     Menorrhagia     hysteroscopy and d and c done     MGUS (monoclonal gammopathy of unknown significance)     eval by Dr. Roberts     Multiple myeloma (H)     dx  at [facility], bone lesions seen on mri      OAB (overactive bladder)     Dr. Grullon     Osteoporosis     fu done  and stable, went off meds then, fu done ; has had gyn fu and added evista 2013 by gyn     Palpitations     nl echo, mildly dilated asc aorta     Paroxysmal atrial fibrillation (H)     had palp and ziopatch showed it, echo nl lv fxn, mild mr and tr, added coum and toprol, toprol dose raised 16     Pulmonary hypertension (H) 2019    seen on echo     Sciatica of left side     Dr. Helen Ricardo      SVT (supraventricular tachycardia) (H)     on ziopatch     Thrombocytopenia (H)      Past Surgical History:   Procedure Laterality Date     BONE MARROW BIOPSY, BONE SPECIMEN, NEEDLE/TROCAR N/A 2016    Procedure: BIOPSY BONE MARROW;  Surgeon: Bryan Patel MD;  Location:  GI     CATARACT IOL, RT/LT        SECTION  ,      COLONOSCOPY  2013     Procedure: COLONOSCOPY;  COLONOSCOPY;  Surgeon: Steffany Rockwell MD;  Location:  GI     EXCISE EXOSTOSIS TIBIA / FIBULA  6/30/2014    Procedure: EXCISE EXOSTOSIS TIBIA / FIBULA;  Surgeon: Naila Pichardo MD;  Location:  SD     hysteroscopy and d and c  2002    due to bleeding     left anle replacement  2007     right ankle surgery  2003     Family History   Problem Relation Age of Onset     Heart Disease Father      C.A.D. Mother      Cerebrovascular Disease Brother      Family History Negative Sister      Family History Negative Sister      Family History Negative Brother             Allergies   Blood transfusion related (informational only) and Penicillin [penicillins]        DAYSI Arellano CNP  [facility]  Pager: [phone]      Thank you for allowing me to participate in the care of your patient.    Sincerely,     DAYSI Arellano CNP     [facility]

## 2019-04-17 NOTE — PATIENT INSTRUCTIONS
Call CORE nurse for any questions or concerns Mon-Fri 8am-4pm:                                                [phone]                                       For concerns after hours:                                                 [phone]     Medication changes: none  Plan from today: Follow up with Elisabeth in 1 month, notify CORE clinic if weight > 129 pounds.   Lab results: see attached; stable kidney function and electrolytes

## 2019-04-17 NOTE — TELEPHONE ENCOUNTER
Routing refill request to provider for review/approval because:  Medication is reported/historical    Please review and authorize if appropriate.    Thank you,   Yaakov GOETZ RN

## 2019-04-17 NOTE — PROGRESS NOTES
Cardiology Clinic Progress Note  Amira Arreola MRN# [id number]   YOB: 1932 Age: 86 year old   Primary Cardiologist: Dr. Rivera Reason for visit: CORE follow up            Assessment and Plan:   Amira Arreola is a very pleasant 86 year old female with a history of HFpEF, chronic atrial fibrillation, hypertension, and hx of multiple myeloma mets to bone (dx 2016, currently being treated with Daratumab, Velcade, and Dexamethasone every 2 weeks). Hospitalized 3/22/19-3/28/19 presented with dyspnea, leg swelling and intermittent palpitations. Patient was found to have atrial fibrillation with RVR and acute diastolic heart failure exacerbation. Patient discharged home from TCU on 4/11/19. Patient here today for CORE follow up.    1.  Chronic diastolic heart failure/HFpEF - Echocardiogram completed 3/23/19 LVEF 55-60%, no wall motion abnormalities, moderate mitral regurgitation, moderate tricuspid regurgitation, and severe pulmonary hypertension. Discharge weight 135#, weight today stable at 128#. Patient appears compensated and euvolemic. Labs from 4/15/19 show stable kidney function and electrolytes.    - NYHA class III, stage C   - Fluid status : euvolemic   - Diuretic regimen : furosemide to 40mg daily   - Aldosterone antagonist : will consider in the future if worsening symptoms   - Blood pressure : controlled   - Reinforced HF education, reviewed low sodium diet and reading labels today.    - Advised to call if weight >129#   - Currently monitoring weight closely at home and supportive/involved children that feel comfortable calling if weight increases, if difficulties with managing weight/fluids will enroll in telehealth tracker.     2. Chronic atrial fibrillation - stable, asymptomatic, rate controlled, during hospitalization metoprolol succinate was increased to 150mg BID and started on diltiazem XR 120mg daily to achieve better rate control. This appears to be managing rate control well   -  Continue diltiazem and metoprolol XL   - [id number] score 5 (HTN, HF, age, female)   - Continue warfarin for thromboembolic prophylaxis.     3. Hypertension - controlled, continue current medications.     4. Moderate mitral regurgitation, moderate tricuspid regurgitation   - Will consider repeat echocardiogram in the future now that patient is euvolemic.      5. Severe pulmonary hypertension - likely secondary to HFpEF and likely improved with diuresis.    - Will consider repeat echocardiogram in the future now that patient is euvolemic.     5. Multiple myeloma with mets to bone - follows with Dr. Roberts   - Started chemo therapy on 4/11/19, on 2 week cycles.         Changes today: none    Follow up plan:     CORE followup with Elisabeth in 1 month with labs prior         History of Presenting Illness:    Amira Arreola is a very pleasant 86 year old female with a history of HFpEF, chronic atrial fibrillation, hypertension, and hx of multiple myeloma mets to bone (dx 2016, currently being treated with Daratumab, Velcade, and Dexamethasone every 2 weeks).     Hospitalized 3/22/19-3/28/19 presented with dyspnea, leg swelling and intermittent palpitations. Patient was found to have atrial fibrillation with RVR and acute diastolic heart failure exacerbation. BNP elevated at 4828, CXR with bilateral moderate effusions with infiltrates vs. Atelectasis. Echocardiogram completed 3/23/19 LVEF 55-60%, no wall motion abnormalities, moderate mitral regurgitation, moderate tricuspid regurgitation, and severe pulmonary hypertension. Oral diltiazem started and metoprolol succinate escalated while hospitalized to help with rate control. Diuresed with IV furosemide and discharged on PO furosemide 40mg BID. Weight 3/24/19 147# (doesn't appear admission weight was completed). Discharge weight 135#. Discharged to TCU.     During last CORE visit on 4/9/19 patient was compensated and euvolemic. No medication changes were made and she was  continued on furosemide 40mg daily. Patient discharged home from TCU on 4/11/19.     Patient is here today for CORE followup. Patient reports feeling good. Monitoring weights dailys at home Clinic weights stable, 128# today, has been ranging 125-129# since hospital discharge. Weight at home stable, today 127# (with shoes/clothes on). Denies lower extremity edema. Denies abdominal distention/bloating. Denies shortness of breath at rest. Denies exertional dyspnea with current levels of activity. Does note some fatigue after 1 flight of stairs, denies dyspnea. Sleeping good. Denies orthopnea or PND. Sleepign with 1 pillow. Report good energy levels. Patient denies chest pain or chest tightness. Denies dizziness, lightheadedness or other presyncopal symptoms. Denies tachycardia or palpitations. Denies bleeding episodes. Some complaints of chronic back pain. Restarted chemo therapy on 4/11/19 and now on 2 week cycles. HHC RN coming to the house and PT also coming.    Labs from 4/15/19 show stable kidney function, electrolytes stable. Blood pressure 96/60 and HR 72 in clinic today.     Appetite is good, feels she has been eating more since getting home. Son helps with dinner. Eating meals at home. Not adding salt to foods. Patient reports drinking < 2L daily. No tobacco or alcohol use. Living with her son at home and daughter living next door.         Recent Hospitalizations   3/22/19-3/28/19 presented with dyspnea, leg swelling and intermittent palpitations. Patient was found to have atrial fibrillation with RVR and acute diastolic heart failure exacerbation. Weight 3/24/19 147# (doesn't appear admission weight was completed). Discharge weight 135#.        Social History    , 6 children, Enjoys keeping the house clean and orderly. Retired nurse.   Social History     Socioeconomic History     Marital status:      Spouse name: Tom     Number of children: 6     Years of education: Not on file     Highest  "education level: Not on file   Occupational History     Occupation: rn anesthetist     Employer: RETIRED   Social Needs     Financial resource strain: Not on file     Food insecurity:     Worry: Not on file     Inability: Not on file     Transportation needs:     Medical: Not on file     Non-medical: Not on file   Tobacco Use     Smoking status: Never Smoker     Smokeless tobacco: Never Used   Substance and Sexual Activity     Alcohol use: No     Alcohol/week: 0.0 oz     Drug use: No     Sexual activity: Never   Lifestyle     Physical activity:     Days per week: Not on file     Minutes per session: Not on file     Stress: Not on file   Relationships     Social connections:     Talks on phone: Not on file     Gets together: Not on file     Attends Muslim service: Not on file     Active member of club or organization: Not on file     Attends meetings of clubs or organizations: Not on file     Relationship status: Not on file     Intimate partner violence:     Fear of current or ex partner: Not on file     Emotionally abused: Not on file     Physically abused: Not on file     Forced sexual activity: Not on file   Other Topics Concern     Parent/sibling w/ CABG, MI or angioplasty before 65F 55M? Not Asked   Social History Narrative     Not on file            Review of Systems:   Skin:  Positive for bruising   Eyes:  Positive for glasses  ENT:  Negative    Respiratory:  Negative    Cardiovascular:    Positive for;chest pain  Gastroenterology: Positive for constipation  Genitourinary:  Negative    Musculoskeletal:  Positive for arthritis  Neurologic:  Negative    Psychiatric:  Positive for anxiety  Heme/Lymph/Imm:  Positive for    Endocrine:  Negative           Physical Exam:   Vitals: BP 96/60   Pulse 72   Ht 1.651 m (5' 5\")   Wt 58.1 kg (128 lb)   SpO2 99%   BMI 21.30 kg/m     Wt Readings from Last 4 Encounters:   04/17/19 58.1 kg (128 lb)   04/15/19 56.7 kg (125 lb)   04/11/19 57.1 kg (125 lb 12.8 oz) "   04/11/19 56.9 kg (125 lb 6.4 oz)     GEN: well nourished, in no acute distress.  HEENT:  Pupils equal, round. Sclerae nonicteric.   NECK: Supple, no masses appreciated. No JVD appreciated on exam at 90 degrees  C/V:  Irregularly irregular rate and rhythm, no murmur, rub or gallop.   RESP: Respirations are unlabored. Clear bilaterally. No crackles, wheezes or rales.    GI: Abdomen distended, nontender.  EXTREM: Trace LE edema   NEURO: Alert and oriented, cooperative.  SKIN: Warm and dry.        Data:   ECHO 3/23/19  The left ventricle is normal in size.  The visual ejection fraction is estimated at 55-60%.  No regional wall motion abnormalities noted.  There is moderate (2+) mitral regurgitation.  There is moderate (2+) tricuspid regurgitation.  Severe (>55mmHg) pulmonary hypertension is present.  The rhythm was rapid atrial fibrillation.    LIPID RESULTS:  Lab Results   Component Value Date    CHOL 160 10/12/2016    HDL 76 10/12/2016    LDL 71 10/12/2016    TRIG 66 10/12/2016    CHOLHDLRATIO 2.3 09/21/2015     LIVER ENZYME RESULTS:  Lab Results   Component Value Date    AST 34 03/13/2019    ALT 68 (H) 03/13/2019     CBC RESULTS:  Lab Results   Component Value Date    WBC 15.7 (H) 04/11/2019    RBC 3.80 04/11/2019    HGB 11.9 04/11/2019    HCT 36.0 04/11/2019    MCV 95 04/11/2019    MCH 31.3 04/11/2019    MCHC 33.1 04/11/2019    RDW 14.6 04/11/2019     04/11/2019     BMP RESULTS:  Lab Results   Component Value Date     04/15/2019    POTASSIUM 4.5 04/15/2019    CHLORIDE 108 04/15/2019    CO2 25 04/15/2019    ANIONGAP 6 04/15/2019     (H) 04/15/2019    BUN 22 04/15/2019    CR 0.89 04/15/2019    GFRESTIMATED 58 (L) 04/15/2019    GFRESTBLACK 67 04/15/2019    BRADLEY 9.8 04/15/2019      INR RESULTS:  Lab Results   Component Value Date    INR 2.73 (H) 03/28/2019    INR 2.55 (H) 03/27/2019            Medications     Current Outpatient Medications   Medication Sig Dispense Refill     Acetaminophen (TYLENOL  PO) Take 500 mg by mouth every 6 hours as needed for mild pain or fever       acyclovir (ZOVIRAX) 400 MG tablet Take 1 tablet (400 mg) by mouth 2 times daily 60 tablet 1     Carboxymethylcellulose Sod PF (REFRESH PLUS) 0.5 % SOLN ophthalmic solution 1 drop 4 times daily as needed for dry eyes       Dexamethasone (DECADRON PO) Take by mouth See Admin Instructions Give 20 mg every wed.       diltiazem ER (DILT-XR) 120 MG 24 hr capsule Take 1 capsule (120 mg) by mouth daily 30 capsule 1     furosemide (LASIX) 40 MG tablet Take 1 tablet (40 mg) by mouth daily 60 tablet 1     latanoprost (XALATAN) 0.005 % ophthalmic solution Place 1 drop into the right eye daily   6     lidocaine-prilocaine (EMLA) cream Apply to port site 1 hour prior to access 30 g 1     metoprolol succinate ER (TOPROL-XL) 50 MG 24 hr tablet Take 3 tablets (150 mg) by mouth 2 times daily 180 tablet 3     Multiple Vitamin (DAILY MULTIVITAMIN PO) Take 1 tablet by mouth daily.       oxyCODONE (ROXICODONE) 5 MG tablet Take 1 tablet (5 mg) by mouth every 4 hours as needed for pain maximum 4 tablet(s) per day 30 tablet 0     polyethylene glycol (MIRALAX/GLYCOLAX) powder Take 17 g by mouth daily as needed        potassium chloride (KLOR-CON) 20 MEQ packet Take 20 mEq by mouth 2 times daily 60 packet 1     prochlorperazine (COMPAZINE) 10 MG tablet Take 1 tablet (10 mg) by mouth every 6 hours as needed for nausea or vomiting 30 tablet 1     SIMBRINZA 1-0.2 % ophthalmic suspension Place 1 drop into the right eye 2 times daily 1 drop AM and PM  2     Sodium Fluoride (SF 5000 PLUS) 1.1 % CREA Apply to affected area 3 times daily       vitamin D3 (VITAMIN D3) 1000 units (25 mcg) tablet Take 1,000 Units by mouth daily       warfarin (COUMADIN) 4 MG tablet Take 4 mg by mouth daily Saturday and Tuesday       warfarin (COUMADIN) 4 MG tablet Take 2 mg by mouth daily Sunday, Monday, Wednesday, Thursday, Friday             Past Medical History     Past Medical History:    Diagnosis Date     Abnormal CXR     then ct done and not significant     Acute diastolic heart failure (H) 2019    nl ef, 2+mr and tr with severe pulm htn     Ascending aorta dilatation (H) 2016    on echo, mild, fu  4.0, slightly larger     Cancer, metastatic to bone (H)     due to myeloma     Colonic polyp     adenomatous, fu  tics only     Compression fracture     multiple areas of spine     Dry eyes      Elevated MCV     b12 and folic acid nl     HTN (hypertension)     off meds for years     Lung nodule 2018    on ct, 4mm, ct done for fu abnl cxr     Menorrhagia     hysteroscopy and d and c done     MGUS (monoclonal gammopathy of unknown significance)     eval by Dr. Roberts     Multiple myeloma (H)     dx  at [place], bone lesions seen on mri      OAB (overactive bladder)     Dr. Grullon     Osteoporosis     fu done  and stable, went off meds then, fu done ; has had gyn fu and added evista  by gyn     Palpitations     nl echo, mildly dilated asc aorta     Paroxysmal atrial fibrillation (H)     had palp and ziopatch showed it, echo nl lv fxn, mild mr and tr, added coum and toprol, toprol dose raised 16     Pulmonary hypertension (H) 2019    seen on echo     Sciatica of left side     Dr. Helen Ricardo      SVT (supraventricular tachycardia) (H)     on ziopatch     Thrombocytopenia (H)      Past Surgical History:   Procedure Laterality Date     BONE MARROW BIOPSY, BONE SPECIMEN, NEEDLE/TROCAR N/A 2016    Procedure: BIOPSY BONE MARROW;  Surgeon: Bryan Patel MD;  Location:  GI     CATARACT IOL, RT/LT        SECTION  ,      COLONOSCOPY  2013    Procedure: COLONOSCOPY;  COLONOSCOPY;  Surgeon: Steffany Rockwell MD;  Location:  GI     EXCISE EXOSTOSIS TIBIA / FIBULA  2014    Procedure: EXCISE EXOSTOSIS TIBIA / FIBULA;  Surgeon: Naila Pichardo  MD;  Location: [facility]     hysteroscopy and d and c  2002    due to bleeding     left anle replacement  2007     right ankle surgery  2003     Family History   Problem Relation Age of Onset     Heart Disease Father      C.A.D. Mother      Cerebrovascular Disease Brother      Family History Negative Sister      Family History Negative Sister      Family History Negative Brother             Allergies   Blood transfusion related (informational only) and Penicillin [penicillins]        DAYSI Arellano [facility]  Pager: [phone]

## 2019-04-18 ENCOUNTER — TELEPHONE (OUTPATIENT)
Dept: FAMILY MEDICINE | Facility: CLINIC | Age: 84
End: 2019-04-18

## 2019-04-18 ENCOUNTER — ANTICOAGULATION THERAPY VISIT (OUTPATIENT)
Dept: FAMILY MEDICINE | Facility: CLINIC | Age: 84
End: 2019-04-18
Payer: MEDICARE

## 2019-04-18 DIAGNOSIS — Z79.01 LONG TERM CURRENT USE OF ANTICOAGULANT THERAPY: ICD-10-CM

## 2019-04-18 LAB — INR PPP: 2.3

## 2019-04-18 PROCEDURE — 99207 ZZC NO CHARGE NURSE ONLY: CPT | Performed by: INTERNAL MEDICINE

## 2019-04-18 NOTE — PROGRESS NOTES
ANTICOAGULATION FOLLOW-UP CLINIC VISIT    Patient Name:  Amira Arreola  Date:  2019  Contact Type:  Face to Face    SUBJECTIVE:     Patient Findings     Comments:   Reason for Call:  INR    Who is calling?  Home [facility]: [facility]    Phone number:  [phone]    Fax number:  None    Name of caller: Rajiv    INR Value:  2.3    Are there any other concerns:  No    Can we leave a detailed message on this number? Not Applicable        Call taken on 2019 at 1:18 PM by Vera Fajardo                 OBJECTIVE    INR   Date Value Ref Range Status   2019 2.3  Final       ASSESSMENT / PLAN  INR assessment THER    Recheck INR In: 1 WEEK    INR Location Homecare INR      Anticoagulation Summary  As of 2019    INR goal:   2.0-3.0   TTR:   67.3 % (2.8 y)   INR used for dosin.3 (2019)   Warfarin maintenance plan:   4 mg (4 mg x 1) every Tue, Sat; 2 mg (4 mg x 0.5) all other days   Full warfarin instructions:   4 mg every Tue, Sat; 2 mg all other days   Weekly warfarin total:   18 mg   No change documented:   Bri Mcbride, RN   Plan last modified:   Jessica Moody RN (3/13/2019)   Next INR check:   2019   Target end date:   Indefinite    Indications    Long term current use of anticoagulant therapy [Z79.01]  Atrial fibrillation (H) [I48.91] (Resolved) [I48.91]             Anticoagulation Episode Summary     INR check location:       Preferred lab:       Send INR reminders to:    ANTICOAGULATION    Comments:    INR to be drawn at infusion visit OR home care nurse. If scheduled for Homecare, Please notify  Zhane [phone] Patient can be reached at home phone [phone]. Do not leave dosing with spouse      Anticoagulation Care Providers     Provider Role Specialty Phone number    Addy Frias MD [facility] [phone]            See the Encounter Report to view Anticoagulation Flowsheet and Dosing Calendar (Go to Encounters tab in chart review, and  find the Anticoagulation Therapy Visit)    Dosage adjustment made based on physician directed care plan.    Orders given: Warfarin dosing and next INR date.  See flowsheet.       Bri Mcbride RN

## 2019-04-18 NOTE — TELEPHONE ENCOUNTER
Reason for Call:  INR    Who is calling?  Home [facility]: [facility]    Phone number:  [phone]    Fax number:  None    Name of caller: Rajiv    INR Value:  2.3    Are there any other concerns:  No    Can we leave a detailed message on this number? Not Applicable        Call taken on 4/18/2019 at 1:18 PM by Vera Fajardo

## 2019-04-24 ENCOUNTER — INFUSION THERAPY VISIT (OUTPATIENT)
Dept: INFUSION THERAPY | Facility: CLINIC | Age: 84
End: 2019-04-24
Attending: INTERNAL MEDICINE
Payer: MEDICARE

## 2019-04-24 ENCOUNTER — HOSPITAL ENCOUNTER (OUTPATIENT)
Facility: CLINIC | Age: 84
Setting detail: SPECIMEN
Discharge: HOME | End: 2019-04-24
Attending: INTERNAL MEDICINE | Admitting: INTERNAL MEDICINE
Payer: MEDICARE

## 2019-04-24 ENCOUNTER — ONCOLOGY VISIT (OUTPATIENT)
Dept: ONCOLOGY | Facility: CLINIC | Age: 84
End: 2019-04-24
Attending: INTERNAL MEDICINE
Payer: MEDICARE

## 2019-04-24 VITALS
HEART RATE: 125 BPM | OXYGEN SATURATION: 100 % | SYSTOLIC BLOOD PRESSURE: 101 MMHG | HEIGHT: 65 IN | WEIGHT: 131 LBS | DIASTOLIC BLOOD PRESSURE: 71 MMHG | TEMPERATURE: 97.9 F | RESPIRATION RATE: 18 BRPM | BODY MASS INDEX: 21.83 KG/M2

## 2019-04-24 VITALS
RESPIRATION RATE: 16 BRPM | HEART RATE: 116 BPM | OXYGEN SATURATION: 100 % | TEMPERATURE: 97.9 F | DIASTOLIC BLOOD PRESSURE: 73 MMHG | SYSTOLIC BLOOD PRESSURE: 107 MMHG

## 2019-04-24 DIAGNOSIS — C90.00 MULTIPLE MYELOMA NOT HAVING ACHIEVED REMISSION (H): ICD-10-CM

## 2019-04-24 DIAGNOSIS — I48.0 PAROXYSMAL ATRIAL FIBRILLATION (H): ICD-10-CM

## 2019-04-24 DIAGNOSIS — C90.00 MULTIPLE MYELOMA NOT HAVING ACHIEVED REMISSION (H): Primary | ICD-10-CM

## 2019-04-24 LAB
ALBUMIN SERPL-MCNC: 3.3 G/DL (ref 3.4–5)
ALP SERPL-CCNC: 53 U/L (ref 40–150)
ALT SERPL W P-5'-P-CCNC: 23 U/L (ref 0–50)
AST SERPL W P-5'-P-CCNC: 19 U/L (ref 0–45)
BASOPHILS # BLD AUTO: 0 10E9/L (ref 0–0.2)
BASOPHILS NFR BLD AUTO: 0.2 %
BILIRUB DIRECT SERPL-MCNC: 0.2 MG/DL (ref 0–0.2)
BILIRUB SERPL-MCNC: 0.6 MG/DL (ref 0.2–1.3)
DIFFERENTIAL METHOD BLD: ABNORMAL
EOSINOPHIL # BLD AUTO: 0.1 10E9/L (ref 0–0.7)
EOSINOPHIL NFR BLD AUTO: 1.2 %
ERYTHROCYTE [DISTWIDTH] IN BLOOD BY AUTOMATED COUNT: 15.5 % (ref 10–15)
HCT VFR BLD AUTO: 35.6 % (ref 35–47)
HGB BLD-MCNC: 11.6 G/DL (ref 11.7–15.7)
IMM GRANULOCYTES # BLD: 0 10E9/L (ref 0–0.4)
IMM GRANULOCYTES NFR BLD: 0.2 %
INR PPP: 2.18 (ref 0.86–1.14)
LYMPHOCYTES # BLD AUTO: 1.2 10E9/L (ref 0.8–5.3)
LYMPHOCYTES NFR BLD AUTO: 19.5 %
MCH RBC QN AUTO: 31.2 PG (ref 26.5–33)
MCHC RBC AUTO-ENTMCNC: 32.6 G/DL (ref 31.5–36.5)
MCV RBC AUTO: 96 FL (ref 78–100)
MONOCYTES # BLD AUTO: 0.8 10E9/L (ref 0–1.3)
MONOCYTES NFR BLD AUTO: 14.1 %
NEUTROPHILS # BLD AUTO: 3.9 10E9/L (ref 1.6–8.3)
NEUTROPHILS NFR BLD AUTO: 64.8 %
NRBC # BLD AUTO: 0 10*3/UL
NRBC BLD AUTO-RTO: 0 /100
PLATELET # BLD AUTO: 176 10E9/L (ref 150–450)
PROT SERPL-MCNC: 5.9 G/DL (ref 6.8–8.8)
RBC # BLD AUTO: 3.72 10E12/L (ref 3.8–5.2)
WBC # BLD AUTO: 5.9 10E9/L (ref 4–11)

## 2019-04-24 PROCEDURE — 25000132 ZZH RX MED GY IP 250 OP 250 PS 637: Mod: GY | Performed by: INTERNAL MEDICINE

## 2019-04-24 PROCEDURE — 85610 PROTHROMBIN TIME: CPT | Performed by: INTERNAL MEDICINE

## 2019-04-24 PROCEDURE — 00000402 ZZHCL STATISTIC TOTAL PROTEIN: Performed by: INTERNAL MEDICINE

## 2019-04-24 PROCEDURE — 96401 CHEMO ANTI-NEOPL SQ/IM: CPT

## 2019-04-24 PROCEDURE — 83883 ASSAY NEPHELOMETRY NOT SPEC: CPT | Performed by: INTERNAL MEDICINE

## 2019-04-24 PROCEDURE — 25000128 H RX IP 250 OP 636: Performed by: INTERNAL MEDICINE

## 2019-04-24 PROCEDURE — 80076 HEPATIC FUNCTION PANEL: CPT | Performed by: INTERNAL MEDICINE

## 2019-04-24 PROCEDURE — 96415 CHEMO IV INFUSION ADDL HR: CPT

## 2019-04-24 PROCEDURE — 85025 COMPLETE CBC W/AUTO DIFF WBC: CPT | Performed by: INTERNAL MEDICINE

## 2019-04-24 PROCEDURE — 96413 CHEMO IV INFUSION 1 HR: CPT

## 2019-04-24 PROCEDURE — 84165 PROTEIN E-PHORESIS SERUM: CPT | Performed by: INTERNAL MEDICINE

## 2019-04-24 PROCEDURE — 25800030 ZZH RX IP 258 OP 636: Performed by: INTERNAL MEDICINE

## 2019-04-24 PROCEDURE — 99214 OFFICE O/P EST MOD 30 MIN: CPT | Performed by: NURSE PRACTITIONER

## 2019-04-24 PROCEDURE — A9270 NON-COVERED ITEM OR SERVICE: HCPCS | Mod: GY | Performed by: INTERNAL MEDICINE

## 2019-04-24 PROCEDURE — 96367 TX/PROPH/DG ADDL SEQ IV INF: CPT

## 2019-04-24 RX ORDER — ACETAMINOPHEN 325 MG/1
650 TABLET ORAL ONCE
Status: COMPLETED | OUTPATIENT
Start: 2019-04-24 | End: 2019-04-24

## 2019-04-24 RX ORDER — HEPARIN SODIUM (PORCINE) LOCK FLUSH IV SOLN 100 UNIT/ML 100 UNIT/ML
5 SOLUTION INTRAVENOUS EVERY 8 HOURS
Status: DISCONTINUED | OUTPATIENT
Start: 2019-04-24 | End: 2019-04-24 | Stop reason: HOSPADM

## 2019-04-24 RX ORDER — OXYCODONE HYDROCHLORIDE 5 MG/1
5 TABLET ORAL EVERY 4 HOURS PRN
Qty: 30 TABLET | Refills: 0 | Status: SHIPPED | OUTPATIENT
Start: 2019-04-24 | End: 2019-05-22

## 2019-04-24 RX ORDER — DEXAMETHASONE 4 MG/1
TABLET ORAL
Qty: 28 TABLET | Refills: 0 | Status: SHIPPED | OUTPATIENT
Start: 2019-04-24 | End: 2019-06-28

## 2019-04-24 RX ADMIN — DARATUMUMAB 1000 MG: 100 INJECTION, SOLUTION, CONCENTRATE INTRAVENOUS at 14:30

## 2019-04-24 RX ADMIN — HEPARIN SODIUM (PORCINE) LOCK FLUSH IV SOLN 100 UNIT/ML 5 ML: 100 SOLUTION at 16:03

## 2019-04-24 RX ADMIN — DEXAMETHASONE SODIUM PHOSPHATE 12 MG: 10 INJECTION, SOLUTION INTRAMUSCULAR; INTRAVENOUS at 13:43

## 2019-04-24 RX ADMIN — SODIUM CHLORIDE 250 ML: 9 INJECTION, SOLUTION INTRAVENOUS at 13:28

## 2019-04-24 RX ADMIN — DIPHENHYDRAMINE HYDROCHLORIDE 50 MG: 50 INJECTION INTRAMUSCULAR; INTRAVENOUS at 14:02

## 2019-04-24 RX ADMIN — BORTEZOMIB 2.1 MG: 3.5 INJECTION, POWDER, LYOPHILIZED, FOR SOLUTION INTRAVENOUS; SUBCUTANEOUS at 15:48

## 2019-04-24 RX ADMIN — ACETAMINOPHEN 650 MG: 325 TABLET, FILM COATED ORAL at 13:26

## 2019-04-24 ASSESSMENT — PAIN SCALES - GENERAL: PAINLEVEL: NO PAIN (0)

## 2019-04-24 ASSESSMENT — MIFFLIN-ST. JEOR: SCORE: 1035.09

## 2019-04-24 NOTE — PROGRESS NOTES
"Oncology Rooming Note    April 24, 2019 12:41 PM   Amira Arreola is a 86 year old female who presents for:    Chief Complaint   Patient presents with     Oncology Clinic Visit     Initial Vitals: /71   Pulse 125   Temp 97.9  F (36.6  C) (Oral)   Resp 18   Ht 1.651 m (5' 5\")   SpO2 100%   BMI 21.30 kg/m   Estimated body mass index is 21.3 kg/m  as calculated from the following:    Height as of this encounter: 1.651 m (5' 5\").    Weight as of 4/17/19: 58.1 kg (128 lb). Body surface area is 1.63 meters squared.  No Pain (0) Comment: Data Unavailable   No LMP recorded. Patient is postmenopausal.  Allergies reviewed: Yes  Medications reviewed: Yes    Medications: Medication refills not needed today.  Pharmacy name entered into [employer]:    [facility] [id number] [place], [place] [address] [place] [facility] [place] [address]      Clinical concerns: needs a refill of Oxy      Camila Mishra CMA            "

## 2019-04-24 NOTE — PROGRESS NOTES
"Hematology-Oncology Follow-up Note  [facility]  Today's Date: 04/24/19        ASSESSMENT/ PLAN :  Amira Arreola is a 86 year old female with       multiple myeloma-kappa lambda free chain  Status post treatment with Revlimid and dexamethasone and then Velcade and dexamethasone  Patient has been on daratumumab and Velcade and dexamethasone since May 2017  Velcade given every 14 days  -So far patient had good response  Labs reviewed okay to proceed with the treatment  Patient has appointment in 2 weeks for Velcade and to see Dr. Roberts  -kappa/lambda and  SPEP ordered today      Prophylaxis  Continue with acyclovir 400 mg p.o. twice daily      Chronic lower back pain  Well-controlled with oxycodone.  She takes occasionally oxycodone  Patient wants me to refill oxycodone today  Rx oxycodone  Call if worsening of back pain    Congestive heart failure  Continue follow-up with her PCP    KAROL Michaels  Patient is on Coumadin INR is checked by PCP  Patient denies bleeding      Patient is asked to cold he developed a fever chills sweats cough shortness of breath nausea vomiting diarrhea or abdominal pain or any changes in health condition        INTERIM HISTORY:  Patient reports feeling well  Denies fever chills sweats cough shortness of breath nausea vomiting diarrhea abdominal pain skin rash.  Patient has chronic back pain .  Pain is well controlled with oxycodone .she takes oxycodone 5 mg occasionally which helps with the pain she denies numbness tingling denies saddle anesthesia  Denies Constipation       MEDICATIONS:  Reviewed         PHYSICAL EXAM:  Vital signs:  Temp: 97.9  F (36.6  C) Temp src: Oral BP: 101/71 Pulse: 125   Resp: 18 SpO2: 100 %     Height: 165.1 cm (5' 5\") Weight: 59.4 kg (131 lb)  Estimated body mass index is 21.8 kg/m  as calculated from the following:    Height as of this encounter: 1.651 m (5' 5\").    Weight as of this encounter: 59.4 kg (131 lb).      GENERAL/CONSTITUTIONAL: " No acute distress.  RESPIRATORY: Clear to auscultation bilaterally. No crackles or wheezing.   CARDIOVASCULAR: Regular rate and rhythm without murmurs, gallops, or rubs.  GASTROINTESTINAL: No hepatosplenomegaly, masses, or tenderness. The patient has normal bowel sounds. No guarding.  No distention.  MUSCULOSKELETAL: Warm and well-perfused, no cyanosis, clubbing, or edema.  LYMPH: No anterior cervical, posterior cervical, supraclavicular, axillary or inguinal adenopathy.       LABS:  Results for ALBERTO PARSON (MRN [id number]) as of 4/24/2019 14:07   Ref. Range 4/24/2019 11:48   Albumin Latest Ref Range: 3.4 - 5.0 g/dL 3.3 (L)   Protein Total Latest Ref Range: 6.8 - 8.8 g/dL 5.9 (L)   Bilirubin Total Latest Ref Range: 0.2 - 1.3 mg/dL 0.6   Alkaline Phosphatase Latest Ref Range: 40 - 150 U/L 53   ALT Latest Ref Range: 0 - 50 U/L 23   AST Latest Ref Range: 0 - 45 U/L 19   Bilirubin Direct Latest Ref Range: 0.0 - 0.2 mg/dL 0.2   WBC Latest Ref Range: 4.0 - 11.0 10e9/L 5.9   Hemoglobin Latest Ref Range: 11.7 - 15.7 g/dL 11.6 (L)   Hematocrit Latest Ref Range: 35.0 - 47.0 % 35.6   Platelet Count Latest Ref Range: 150 - 450 10e9/L 176   RBC Count Latest Ref Range: 3.8 - 5.2 10e12/L 3.72 (L)   MCV Latest Ref Range: 78 - 100 fl 96   MCH Latest Ref Range: 26.5 - 33.0 pg 31.2   MCHC Latest Ref Range: 31.5 - 36.5 g/dL 32.6   RDW Latest Ref Range: 10.0 - 15.0 % 15.5 (H)   Diff Method Unknown Automated Method   % Neutrophils Latest Units: % 64.8   % Lymphocytes Latest Units: % 19.5   % Monocytes Latest Units: % 14.1   % Eosinophils Latest Units: % 1.2   % Basophils Latest Units: % 0.2   % Immature Granulocytes Latest Units: % 0.2   Nucleated RBCs Latest Ref Range: 0 /100 0   Absolute Neutrophil Latest Ref Range: 1.6 - 8.3 10e9/L 3.9   Absolute Lymphocytes Latest Ref Range: 0.8 - 5.3 10e9/L 1.2   Absolute Monocytes Latest Ref Range: 0.0 - 1.3 10e9/L 0.8   Absolute Eosinophils Latest Ref Range: 0.0 - 0.7 10e9/L 0.1   Absolute  Basophils Latest Ref Range: 0.0 - 0.2 10e9/L 0.0   Abs Immature Granulocytes Latest Ref Range: 0 - 0.4 10e9/L 0.0   Absolute Nucleated RBC Unknown 0.0           Todd Loya Nurse Practitioner   Hematology/Oncology  [facility]    Chart documentation with Dragon Voice recognition Software. Although reviewed after completion, some words and grammatical errors may remain.

## 2019-04-24 NOTE — PROGRESS NOTES
Infusion Nursing Note:  Amira Arreola presents today for C25D1 Velcade, Daratumumab.    Patient seen by provider today: Yes: Todd Loya NP    Note: Patient reports fatigue and some weakness but no other new concerns. Patient tolerated today's infusion well.     Intravenous Access:  Implanted Port.      Treatment Conditions:  Lab Results   Component Value Date    HGB 11.6 04/24/2019     Lab Results   Component Value Date    WBC 5.9 04/24/2019      Lab Results   Component Value Date    ANEU 3.9 04/24/2019     Lab Results   Component Value Date     04/24/2019      Lab Results   Component Value Date     04/15/2019                   Lab Results   Component Value Date    POTASSIUM 4.5 04/15/2019           Lab Results   Component Value Date    MAG 2.0 03/22/2019            Lab Results   Component Value Date    CR 0.89 04/15/2019                   Lab Results   Component Value Date    BRADLEY 9.8 04/15/2019                Lab Results   Component Value Date    BILITOTAL 0.6 04/24/2019           Lab Results   Component Value Date    ALBUMIN 3.3 04/24/2019                    Lab Results   Component Value Date    ALT 23 04/24/2019           Lab Results   Component Value Date    AST 19 04/24/2019       Results reviewed, labs MET treatment parameters, ok to proceed with treatment.      Post Infusion Assessment:  Patient tolerated infusion without incident.  Patient tolerated one Velcade injection without incident to right abdomen.  Blood return noted pre and post infusion.  Site patent and intact, free from redness, edema or discomfort.  No evidence of extravasations.  Access discontinued per protocol.    Discharge Plan:   Prescription refills given for Dexamethasone, Oxycodone .  Discharge instructions reviewed with: Patient.  Patient and/or family verbalized understanding of discharge instructions and all questions answered.  Copy of AVS reviewed with patient and/or family.  Patient will return 5/8 for next  appointment.  Patient discharged in stable condition accompanied by: self; patient met daughter at front door of building, brought down by staff.  Departure Mode: Wheelchair.    Libby Nguyễn RN

## 2019-04-24 NOTE — LETTER
"    4/24/2019         RE: Amira Arreola  [address]  [place] [address]        Dear Colleague,    Thank you for referring your patient, Amira Arreola, to the [facility]. Please see a copy of my visit note below.    Oncology Rooming Note    April 24, 2019 12:41 PM   Amira Arreola is a 86 year old female who presents for:    Chief Complaint   Patient presents with     Oncology Clinic Visit     Initial Vitals: /71   Pulse 125   Temp 97.9  F (36.6  C) (Oral)   Resp 18   Ht 1.651 m (5' 5\")   SpO2 100%   BMI 21.30 kg/m    Estimated body mass index is 21.3 kg/m  as calculated from the following:    Height as of this encounter: 1.651 m (5' 5\").    Weight as of 4/17/19: 58.1 kg (128 lb). Body surface area is 1.63 meters squared.  No Pain (0) Comment: Data Unavailable   No LMP recorded. Patient is postmenopausal.  Allergies reviewed: Yes  Medications reviewed: Yes    Medications: Medication refills not needed today.  Pharmacy name entered into [employer] [employer]:    [id number] [address] [facility] [address]      Clinical concerns: needs a refill of Oxy      Camila Mishra, MEDINA              Hematology-Oncology Follow-up Note  [facility]  Today's Date: 04/24/19        ASSESSMENT/ PLAN :  Amira Arreola is a 86 year old female with       multiple myeloma-kappa lambda free chain  Status post treatment with Revlimid and dexamethasone and then Velcade and dexamethasone  Patient has been on daratumumab and Velcade and dexamethasone since May 2017  Velcade given every 14 days  -So far patient had good response  Labs reviewed okay to proceed with the treatment  Patient has appointment in 2 weeks for Velcade and to see Dr. Roberts  -kappa/lambda and  SPEP ordered today      Prophylaxis  Continue with acyclovir 400 mg p.o. twice daily      Chronic lower back pain  Well-controlled with oxycodone.  She takes occasionally " "oxycodone  Patient wants me to refill oxycodone today  Rx oxycodone  Call if worsening of back pain    Congestive heart failure  Continue follow-up with her PCP    KAROL Michaels  Patient is on Coumadin INR is checked by PCP  Patient denies bleeding      Patient is asked to cold he developed a fever chills sweats cough shortness of breath nausea vomiting diarrhea or abdominal pain or any changes in health condition        INTERIM HISTORY:  Patient reports feeling well  Denies fever chills sweats cough shortness of breath nausea vomiting diarrhea abdominal pain skin rash.  Patient has chronic back pain .  Pain is well controlled with oxycodone .she takes oxycodone 5 mg occasionally which helps with the pain she denies numbness tingling denies saddle anesthesia  Denies Constipation       MEDICATIONS:  Reviewed         PHYSICAL EXAM:  Vital signs:  Temp: 97.9  F (36.6  C) Temp src: Oral BP: 101/71 Pulse: 125   Resp: 18 SpO2: 100 %     Height: 165.1 cm (5' 5\") Weight: 59.4 kg (131 lb)  Estimated body mass index is 21.8 kg/m  as calculated from the following:    Height as of this encounter: 1.651 m (5' 5\").    Weight as of this encounter: 59.4 kg (131 lb).      GENERAL/CONSTITUTIONAL: No acute distress.  RESPIRATORY: Clear to auscultation bilaterally. No crackles or wheezing.   CARDIOVASCULAR: Regular rate and rhythm without murmurs, gallops, or rubs.  GASTROINTESTINAL: No hepatosplenomegaly, masses, or tenderness. The patient has normal bowel sounds. No guarding.  No distention.  MUSCULOSKELETAL: Warm and well-perfused, no cyanosis, clubbing, or edema.  LYMPH: No anterior cervical, posterior cervical, supraclavicular, axillary or inguinal adenopathy.       LABS:  Results for ALBERTO PARSON (MRN [id number]) as of 4/24/2019 14:07   Ref. Range 4/24/2019 11:48   Albumin Latest Ref Range: 3.4 - 5.0 g/dL 3.3 (L)   Protein Total Latest Ref Range: 6.8 - 8.8 g/dL 5.9 (L)   Bilirubin Total Latest Ref Range: 0.2 - 1.3 mg/dL 0.6 "   Alkaline Phosphatase Latest Ref Range: 40 - 150 U/L 53   ALT Latest Ref Range: 0 - 50 U/L 23   AST Latest Ref Range: 0 - 45 U/L 19   Bilirubin Direct Latest Ref Range: 0.0 - 0.2 mg/dL 0.2   WBC Latest Ref Range: 4.0 - 11.0 10e9/L 5.9   Hemoglobin Latest Ref Range: 11.7 - 15.7 g/dL 11.6 (L)   Hematocrit Latest Ref Range: 35.0 - 47.0 % 35.6   Platelet Count Latest Ref Range: 150 - 450 10e9/L 176   RBC Count Latest Ref Range: 3.8 - 5.2 10e12/L 3.72 (L)   MCV Latest Ref Range: 78 - 100 fl 96   MCH Latest Ref Range: 26.5 - 33.0 pg 31.2   MCHC Latest Ref Range: 31.5 - 36.5 g/dL 32.6   RDW Latest Ref Range: 10.0 - 15.0 % 15.5 (H)   Diff Method Unknown Automated Method   % Neutrophils Latest Units: % 64.8   % Lymphocytes Latest Units: % 19.5   % Monocytes Latest Units: % 14.1   % Eosinophils Latest Units: % 1.2   % Basophils Latest Units: % 0.2   % Immature Granulocytes Latest Units: % 0.2   Nucleated RBCs Latest Ref Range: 0 /100 0   Absolute Neutrophil Latest Ref Range: 1.6 - 8.3 10e9/L 3.9   Absolute Lymphocytes Latest Ref Range: 0.8 - 5.3 10e9/L 1.2   Absolute Monocytes Latest Ref Range: 0.0 - 1.3 10e9/L 0.8   Absolute Eosinophils Latest Ref Range: 0.0 - 0.7 10e9/L 0.1   Absolute Basophils Latest Ref Range: 0.0 - 0.2 10e9/L 0.0   Abs Immature Granulocytes Latest Ref Range: 0 - 0.4 10e9/L 0.0   Absolute Nucleated RBC Unknown 0.0           Todd Loya, Nurse Practitioner   Hematology/Oncology  [facility]    Chart documentation with Dragon Voice recognition Software. Although reviewed after completion, some words and grammatical errors may remain.         Again, thank you for allowing me to participate in the care of your patient.        Sincerely,        Todd Loya, DAYSI CNP

## 2019-04-25 ENCOUNTER — DOCUMENTATION ONLY (OUTPATIENT)
Dept: CARE COORDINATION | Facility: CLINIC | Age: 84
End: 2019-04-25

## 2019-04-25 ENCOUNTER — ANTICOAGULATION THERAPY VISIT (OUTPATIENT)
Dept: FAMILY MEDICINE | Facility: CLINIC | Age: 84
End: 2019-04-25
Payer: MEDICARE

## 2019-04-25 DIAGNOSIS — Z79.01 LONG TERM CURRENT USE OF ANTICOAGULANT THERAPY: ICD-10-CM

## 2019-04-25 DIAGNOSIS — Z53.9 DIAGNOSIS NOT YET DEFINED: Primary | ICD-10-CM

## 2019-04-25 LAB
ALBUMIN SERPL ELPH-MCNC: 3.5 G/DL (ref 3.7–5.1)
ALPHA1 GLOB SERPL ELPH-MCNC: 0.4 G/DL (ref 0.2–0.4)
ALPHA2 GLOB SERPL ELPH-MCNC: 0.8 G/DL (ref 0.5–0.9)
B-GLOBULIN SERPL ELPH-MCNC: 0.6 G/DL (ref 0.6–1)
GAMMA GLOB SERPL ELPH-MCNC: 0.2 G/DL (ref 0.7–1.6)
M PROTEIN SERPL ELPH-MCNC: 0 G/DL
PROT PATTERN SERPL ELPH-IMP: ABNORMAL

## 2019-04-25 PROCEDURE — 99207 ZZC NO CHARGE NURSE ONLY: CPT | Performed by: INTERNAL MEDICINE

## 2019-04-25 PROCEDURE — G0180 MD CERTIFICATION HHA PATIENT: HCPCS | Performed by: INTERNAL MEDICINE

## 2019-04-25 NOTE — PROGRESS NOTES
ANTICOAGULATION FOLLOW-UP CLINIC VISIT    Patient Name:  Amira Arreola  Date:  2019  Contact Type:  Telephone/ HC nurse    SUBJECTIVE:        OBJECTIVE    INR   Date Value Ref Range Status   2019 2.18 (H) 0.86 - 1.14 Final       ASSESSMENT / PLAN  INR assessment THER    Recheck INR In: 1 WEEK    INR Location Homecare INR      Anticoagulation Summary  As of 2019    INR goal:   2.0-3.0   TTR:   67.6 % (2.8 y)   INR used for dosin.18 (2019)   Warfarin maintenance plan:   4 mg (4 mg x 1) every Tue, Sat; 2 mg (4 mg x 0.5) all other days   Full warfarin instructions:   4 mg every Tue, Sat; 2 mg all other days   Weekly warfarin total:   18 mg   No change documented:   Bri Mcbride RN   Plan last modified:   Jessica Moody RN (3/13/2019)   Next INR check:   2019   Target end date:   Indefinite    Indications    Long term current use of anticoagulant therapy [Z79.01]  Atrial fibrillation (H) [I48.91] (Resolved) [I48.91]             Anticoagulation Episode Summary     INR check location:       Preferred lab:       Send INR reminders to:    ANTICOAGULATION    Comments:    INR to be drawn at infusion visit OR home care nurse. If scheduled for Homecare, Please notify  Zhane [phone] Patient can be reached at home phone [phone]. Do not leave dosing with spouse      Anticoagulation Care Providers     Provider Role Specialty Phone number    Addy Frias MD [facility] Internal Medicine [phone]            See the Encounter Report to view Anticoagulation Flowsheet and Dosing Calendar (Go to Encounters tab in chart review, and find the Anticoagulation Therapy Visit)    Dosage adjustment made based on physician directed care plan.    Called HAKEEM Vance nurse and left detailed message  with Warfarin dosing and next INR date.  See flowsheet.     Rajiv Gusman RN  [phone]      Bri Mcbride RN

## 2019-04-26 LAB
KAPPA LC UR-MCNC: 3.18 MG/DL (ref 0.33–1.94)
KAPPA LC/LAMBDA SER: 11.36 {RATIO} (ref 0.26–1.65)
LAMBDA LC SERPL-MCNC: 0.28 MG/DL (ref 0.57–2.63)

## 2019-04-30 ENCOUNTER — TELEPHONE (OUTPATIENT)
Dept: FAMILY MEDICINE | Facility: CLINIC | Age: 84
End: 2019-04-30

## 2019-04-30 NOTE — TELEPHONE ENCOUNTER
Reason for Call:  INR    Who is calling?  Home Care: [facility]     Phone number:  [phone]    Fax number:  N/a    Name of caller: Ramona    INR Value:  n/a    Are there any other concerns:  Yes: Ramona states pt is due for INR. Ramona wants to know if pt can postpone INR until 05/08/19 since she has been stable.     Can we leave a detailed message on this number? YES    Phone number patient can be reached at: Other phone number:  [phone]      Call taken on 4/30/2019 at 11:27 AM by Krzysztof Mancera

## 2019-04-30 NOTE — TELEPHONE ENCOUNTER
Reason for Call:  Other call back    Detailed comments: I called Home Care Nurse advising her that it was ok for pt to wait until 05/08/19 to come in for INR per [facility] INR Nurse.     Phone Number Patient can be reached at: Other phone number:  NA  Best Time: NA    Can we leave a detailed message on this number? Not Applicable    Call taken on 4/30/2019 at 12:10 PM by Krzysztof Mancera

## 2019-05-07 ENCOUNTER — CARE COORDINATION (OUTPATIENT)
Dept: CARDIOLOGY | Facility: CLINIC | Age: 84
End: 2019-05-07

## 2019-05-07 NOTE — PROGRESS NOTES
Left VM for JOHNNIE Vance home care RN Case Manager. Requested call back regarding Amira, would like to arrange labs to be done prior to next OV here which is 5/15/19. Will await return call. Cinthya Landeros RN on 5/7/2019 at 11:44 AM

## 2019-05-08 ENCOUNTER — ONCOLOGY VISIT (OUTPATIENT)
Dept: ONCOLOGY | Facility: CLINIC | Age: 84
End: 2019-05-08
Attending: INTERNAL MEDICINE
Payer: MEDICARE

## 2019-05-08 ENCOUNTER — TELEPHONE (OUTPATIENT)
Dept: FAMILY MEDICINE | Facility: CLINIC | Age: 84
End: 2019-05-08

## 2019-05-08 ENCOUNTER — HOSPITAL ENCOUNTER (OUTPATIENT)
Facility: CLINIC | Age: 84
Setting detail: SPECIMEN
Discharge: HOME | End: 2019-05-08
Attending: INTERNAL MEDICINE | Admitting: INTERNAL MEDICINE
Payer: MEDICARE

## 2019-05-08 ENCOUNTER — INFUSION THERAPY VISIT (OUTPATIENT)
Dept: INFUSION THERAPY | Facility: CLINIC | Age: 84
End: 2019-05-08
Attending: INTERNAL MEDICINE
Payer: MEDICARE

## 2019-05-08 VITALS
OXYGEN SATURATION: 98 % | SYSTOLIC BLOOD PRESSURE: 96 MMHG | BODY MASS INDEX: 22.1 KG/M2 | DIASTOLIC BLOOD PRESSURE: 65 MMHG | TEMPERATURE: 98.1 F | WEIGHT: 132.8 LBS | HEART RATE: 46 BPM | RESPIRATION RATE: 16 BRPM

## 2019-05-08 VITALS
HEIGHT: 65 IN | WEIGHT: 132.72 LBS | OXYGEN SATURATION: 98 % | SYSTOLIC BLOOD PRESSURE: 96 MMHG | HEART RATE: 86 BPM | BODY MASS INDEX: 22.11 KG/M2 | DIASTOLIC BLOOD PRESSURE: 65 MMHG | RESPIRATION RATE: 16 BRPM | TEMPERATURE: 98.1 F

## 2019-05-08 DIAGNOSIS — Z95.828 PORT-A-CATH IN PLACE: ICD-10-CM

## 2019-05-08 DIAGNOSIS — I48.0 PAROXYSMAL ATRIAL FIBRILLATION (H): Primary | ICD-10-CM

## 2019-05-08 DIAGNOSIS — C90.00 MULTIPLE MYELOMA NOT HAVING ACHIEVED REMISSION (H): ICD-10-CM

## 2019-05-08 DIAGNOSIS — C90.00 MULTIPLE MYELOMA NOT HAVING ACHIEVED REMISSION (H): Primary | ICD-10-CM

## 2019-05-08 LAB
BASOPHILS # BLD AUTO: 0 10E9/L (ref 0–0.2)
BASOPHILS NFR BLD AUTO: 0.1 %
DIFFERENTIAL METHOD BLD: ABNORMAL
EOSINOPHIL # BLD AUTO: 0.1 10E9/L (ref 0–0.7)
EOSINOPHIL NFR BLD AUTO: 0.6 %
ERYTHROCYTE [DISTWIDTH] IN BLOOD BY AUTOMATED COUNT: 16.1 % (ref 10–15)
HCT VFR BLD AUTO: 36.4 % (ref 35–47)
HGB BLD-MCNC: 11.8 G/DL (ref 11.7–15.7)
IMM GRANULOCYTES # BLD: 0.1 10E9/L (ref 0–0.4)
IMM GRANULOCYTES NFR BLD: 0.7 %
INR PPP: 1.54 (ref 0.86–1.14)
LYMPHOCYTES # BLD AUTO: 0.6 10E9/L (ref 0.8–5.3)
LYMPHOCYTES NFR BLD AUTO: 5.1 %
MCH RBC QN AUTO: 31.2 PG (ref 26.5–33)
MCHC RBC AUTO-ENTMCNC: 32.4 G/DL (ref 31.5–36.5)
MCV RBC AUTO: 96 FL (ref 78–100)
MONOCYTES # BLD AUTO: 0.4 10E9/L (ref 0–1.3)
MONOCYTES NFR BLD AUTO: 3.8 %
NEUTROPHILS # BLD AUTO: 10.3 10E9/L (ref 1.6–8.3)
NEUTROPHILS NFR BLD AUTO: 89.7 %
NRBC # BLD AUTO: 0 10*3/UL
NRBC BLD AUTO-RTO: 0 /100
PLATELET # BLD AUTO: 196 10E9/L (ref 150–450)
RBC # BLD AUTO: 3.78 10E12/L (ref 3.8–5.2)
WBC # BLD AUTO: 11.5 10E9/L (ref 4–11)

## 2019-05-08 PROCEDURE — 85610 PROTHROMBIN TIME: CPT | Performed by: INTERNAL MEDICINE

## 2019-05-08 PROCEDURE — 96401 CHEMO ANTI-NEOPL SQ/IM: CPT

## 2019-05-08 PROCEDURE — 85025 COMPLETE CBC W/AUTO DIFF WBC: CPT | Performed by: INTERNAL MEDICINE

## 2019-05-08 PROCEDURE — 99213 OFFICE O/P EST LOW 20 MIN: CPT | Performed by: INTERNAL MEDICINE

## 2019-05-08 PROCEDURE — 25000128 H RX IP 250 OP 636: Performed by: INTERNAL MEDICINE

## 2019-05-08 RX ORDER — ALBUTEROL SULFATE 90 UG/1
1-2 AEROSOL, METERED RESPIRATORY (INHALATION)
Status: CANCELLED
Start: 2019-06-05

## 2019-05-08 RX ORDER — EPINEPHRINE 0.3 MG/.3ML
0.3 INJECTION SUBCUTANEOUS EVERY 5 MIN PRN
Status: CANCELLED | OUTPATIENT
Start: 2019-05-22

## 2019-05-08 RX ORDER — HEPARIN SODIUM (PORCINE) LOCK FLUSH IV SOLN 100 UNIT/ML 100 UNIT/ML
500 SOLUTION INTRAVENOUS EVERY 8 HOURS
Status: DISCONTINUED | OUTPATIENT
Start: 2019-05-08 | End: 2019-05-08 | Stop reason: HOSPADM

## 2019-05-08 RX ORDER — HEPARIN SODIUM (PORCINE) LOCK FLUSH IV SOLN 100 UNIT/ML 100 UNIT/ML
500 SOLUTION INTRAVENOUS EVERY 8 HOURS
Status: CANCELLED
Start: 2019-05-08

## 2019-05-08 RX ORDER — MEPERIDINE HYDROCHLORIDE 25 MG/ML
25 INJECTION INTRAMUSCULAR; INTRAVENOUS; SUBCUTANEOUS EVERY 30 MIN PRN
Status: CANCELLED | OUTPATIENT
Start: 2019-06-05

## 2019-05-08 RX ORDER — EPINEPHRINE 1 MG/ML
0.3 INJECTION, SOLUTION INTRAMUSCULAR; SUBCUTANEOUS EVERY 5 MIN PRN
Status: CANCELLED | OUTPATIENT
Start: 2019-05-22

## 2019-05-08 RX ORDER — DIPHENHYDRAMINE HYDROCHLORIDE 50 MG/ML
50 INJECTION INTRAMUSCULAR; INTRAVENOUS
Status: CANCELLED
Start: 2019-06-05

## 2019-05-08 RX ORDER — HEPARIN SODIUM (PORCINE) LOCK FLUSH IV SOLN 100 UNIT/ML 100 UNIT/ML
5 SOLUTION INTRAVENOUS EVERY 8 HOURS
Status: CANCELLED
Start: 2019-06-05

## 2019-05-08 RX ORDER — SODIUM CHLORIDE 9 MG/ML
1000 INJECTION, SOLUTION INTRAVENOUS CONTINUOUS PRN
Status: CANCELLED
Start: 2019-05-22

## 2019-05-08 RX ORDER — ALBUTEROL SULFATE 90 UG/1
1-2 AEROSOL, METERED RESPIRATORY (INHALATION)
Status: CANCELLED
Start: 2019-05-22

## 2019-05-08 RX ORDER — SODIUM CHLORIDE 9 MG/ML
1000 INJECTION, SOLUTION INTRAVENOUS CONTINUOUS PRN
Status: CANCELLED
Start: 2019-06-05

## 2019-05-08 RX ORDER — LORAZEPAM 2 MG/ML
0.5 INJECTION INTRAMUSCULAR EVERY 4 HOURS PRN
Status: CANCELLED
Start: 2019-06-05

## 2019-05-08 RX ORDER — DIPHENHYDRAMINE HCL 25 MG
50 CAPSULE ORAL ONCE
Status: CANCELLED | OUTPATIENT
Start: 2019-05-22

## 2019-05-08 RX ORDER — ACETAMINOPHEN 325 MG/1
650 TABLET ORAL ONCE
Status: CANCELLED | OUTPATIENT
Start: 2019-05-22

## 2019-05-08 RX ORDER — ALBUTEROL SULFATE 0.83 MG/ML
2.5 SOLUTION RESPIRATORY (INHALATION)
Status: CANCELLED | OUTPATIENT
Start: 2019-06-05

## 2019-05-08 RX ORDER — LORAZEPAM 2 MG/ML
0.5 INJECTION INTRAMUSCULAR EVERY 4 HOURS PRN
Status: CANCELLED
Start: 2019-05-22

## 2019-05-08 RX ORDER — METHYLPREDNISOLONE SODIUM SUCCINATE 125 MG/2ML
125 INJECTION, POWDER, LYOPHILIZED, FOR SOLUTION INTRAMUSCULAR; INTRAVENOUS
Status: CANCELLED
Start: 2019-05-22

## 2019-05-08 RX ORDER — METHYLPREDNISOLONE SODIUM SUCCINATE 125 MG/2ML
125 INJECTION, POWDER, LYOPHILIZED, FOR SOLUTION INTRAMUSCULAR; INTRAVENOUS
Status: CANCELLED
Start: 2019-06-05

## 2019-05-08 RX ORDER — ALBUTEROL SULFATE 0.83 MG/ML
2.5 SOLUTION RESPIRATORY (INHALATION)
Status: CANCELLED | OUTPATIENT
Start: 2019-05-22

## 2019-05-08 RX ORDER — HEPARIN SODIUM (PORCINE) LOCK FLUSH IV SOLN 100 UNIT/ML 100 UNIT/ML
5 SOLUTION INTRAVENOUS EVERY 8 HOURS
Status: CANCELLED
Start: 2019-05-22

## 2019-05-08 RX ORDER — DIPHENHYDRAMINE HYDROCHLORIDE 50 MG/ML
50 INJECTION INTRAMUSCULAR; INTRAVENOUS
Status: CANCELLED
Start: 2019-05-22

## 2019-05-08 RX ORDER — EPINEPHRINE 0.3 MG/.3ML
0.3 INJECTION SUBCUTANEOUS EVERY 5 MIN PRN
Status: CANCELLED | OUTPATIENT
Start: 2019-06-05

## 2019-05-08 RX ORDER — EPINEPHRINE 1 MG/ML
0.3 INJECTION, SOLUTION INTRAMUSCULAR; SUBCUTANEOUS EVERY 5 MIN PRN
Status: CANCELLED | OUTPATIENT
Start: 2019-06-05

## 2019-05-08 RX ORDER — MEPERIDINE HYDROCHLORIDE 25 MG/ML
25 INJECTION INTRAMUSCULAR; INTRAVENOUS; SUBCUTANEOUS EVERY 30 MIN PRN
Status: CANCELLED | OUTPATIENT
Start: 2019-05-22

## 2019-05-08 RX ADMIN — HEPARIN SODIUM (PORCINE) LOCK FLUSH IV SOLN 100 UNIT/ML 500 UNITS: 100 SOLUTION at 09:23

## 2019-05-08 RX ADMIN — BORTEZOMIB 2.1 MG: 3.5 INJECTION, POWDER, LYOPHILIZED, FOR SOLUTION INTRAVENOUS; SUBCUTANEOUS at 10:52

## 2019-05-08 ASSESSMENT — MIFFLIN-ST. JEOR: SCORE: 1042.88

## 2019-05-08 ASSESSMENT — PAIN SCALES - GENERAL
PAINLEVEL: NO PAIN (0)
PAINLEVEL: NO PAIN (0)

## 2019-05-08 NOTE — Clinical Note
"    5/8/2019         RE: Amira Arreola  [address]  [place] [address]        Dear Colleague,    Thank you for referring your patient, Amira Arreola, to the [facility]. Please see a copy of my visit note below.    Oncology Rooming Note    May 8, 2019 9:30 AM   Amira Arreola is a 86 year old female who presents for:    Chief Complaint   Patient presents with     Oncology Clinic Visit     Initial Vitals: BP 96/65   Pulse (!) 46   Temp 98.1  F (36.7  C) (Oral)   Resp 16   Ht 1.651 m (5' 5\")   Wt 60.2 kg (132 lb 11.5 oz)   SpO2 98%   BMI 22.09 kg/m    Estimated body mass index is 22.09 kg/m  as calculated from the following:    Height as of this encounter: 1.651 m (5' 5\").    Weight as of this encounter: 60.2 kg (132 lb 11.5 oz). Body surface area is 1.66 meters squared.  No Pain (0) Comment: Data Unavailable   No LMP recorded. Patient is postmenopausal.  Allergies reviewed: Yes  Medications reviewed: Yes    Medications: Medication refills not needed today.  Pharmacy name entered into [facility]:    [facility] [address] [place] [address] [facility] AT [facility] [address]  [place] [facility] - [address] [facility]/SPECIALTY PHARMACY - [address]    Clinical concerns: no      Camila Mishra [facility]              Visit Date:   05/08/2019      SUBJECTIVE:  Ms. Arreola is an 86-year-old female with kappa free light chain multiple myeloma, on daratumumab and Velcade.  For convenience, she gets Velcade every other week.  Overall, she is doing well.  Disease has been stable.      No headache.  No dizziness.  No chest pain.  No shortness of breath at rest.  She gets short of breath on exertion.  No abdominal pain, nausea or vomiting.  She has chronic back pain.  No worsening of it.  No fever, chills, night sweats.      PHYSICAL EXAMINATION:  Unchanged.      LABORATORY DATA:  Reviewed.      ASSESSMENT:   1.  " An 86-year-old female with kappa free light chain multiple myeloma.   2.  Chronic back pain, stable.   3.  Fatigue secondary to her age and myeloma.      PLAN:   1.  Overall, patient's condition is stable.  Her myeloma is stable.  She will continue on Velcade, daratumumab and dexamethasone.  She is tolerating it well.  Side effects reviewed.   2.  For pain, she will continue on oxycodone.  It is controlling her pain well.  I will see her in 4 weeks.  In between, she will see our nurse practitioner.  I advised the patient to see a physician sooner if she has any lump, unexplained pain, weight loss, worsening weakness, shortness of breath, recurrent vomiting, bleeding or any other concerns.         ITZ LOPEZ MD             D: 2019   T: 2019   MT: AS      Name:     ALBERTO PARSON   MRN:      [id number]        Account:      [id number]   :      1932           Visit Date:   2019      Document: [id number]       Again, thank you for allowing me to participate in the care of your patient.        Sincerely,        Itz Lopez MD

## 2019-05-08 NOTE — PROGRESS NOTES
"Oncology Rooming Note    May 8, 2019 9:30 AM   Amira Arreola is a 86 year old female who presents for:    Chief Complaint   Patient presents with     Oncology Clinic Visit     Initial Vitals: BP 96/65   Pulse (!) 46   Temp 98.1  F (36.7  C) (Oral)   Resp 16   Ht 1.651 m (5' 5\")   Wt 60.2 kg (132 lb 11.5 oz)   SpO2 98%   BMI 22.09 kg/m   Estimated body mass index is 22.09 kg/m  as calculated from the following:    Height as of this encounter: 1.651 m (5' 5\").    Weight as of this encounter: 60.2 kg (132 lb 11.5 oz). Body surface area is 1.66 meters squared.  No Pain (0) Comment: Data Unavailable   No LMP recorded. Patient is postmenopausal.  Allergies reviewed: Yes  Medications reviewed: Yes    Medications: Medication refills not needed today.  Pharmacy name entered into [facility]:    [facility] [address] [place] [address] [address] AT [facility] [address]  [place] [facility] - [address] MAIL/SPECIALTY PHARMACY - [address]    Clinical concerns: no      Camila Mishra CMA            "

## 2019-05-08 NOTE — TELEPHONE ENCOUNTER
Pt due for inr next week due to INR of 1.54 today.  Normally has in Oncology every other week, but when needed more frequently is done with homecare.  Pt reported she had [facility], but when calling [facility] her case was closed on 5/3/19 with notes that she switched homecares.    Left message for pt to call back to coordinate/get information for new homecare or get pt into clinic for next inr.  Jessica Moody RN

## 2019-05-08 NOTE — PATIENT INSTRUCTIONS
Continue chemotherapy.  See Todd Loya in 2 weeks.  Follow up in 4 weeks.  Recheck pulse. Re-checked pulse. SLS.    Patient back in [facility]

## 2019-05-08 NOTE — TELEPHONE ENCOUNTER
Reason for Call:  Form, our goal is to have forms completed with 72 hours, however, some forms may require a visit or additional information.    Type of letter, form or note:  POLST    Who is the form from?: Patient    Where did the form come from: Patient or family brought in       What clinic location was the form placed at?: [facility]    Where the form was placed: Placed in Provider inbox  Box/Folder    What number is listed as a contact on the form?: [phone]       Additional comments: Requesting for the completed POLST form to be mailed, envelope was left with address of where to send.     Call taken on 5/8/2019 at 8:39 AM by Lee Ann Howard

## 2019-05-08 NOTE — PROGRESS NOTES
Infusion Nursing Note:  Amira IVY Arreola presents today for Cycle 25 Day 15 Velcade.    Patient seen by provider today: Yes: Dr. Roberts   present during visit today: Not Applicable.    Note: N/A.    Intravenous Access:  Implanted Port.    Treatment Conditions:  Lab Results   Component Value Date    HGB 11.8 05/08/2019     Lab Results   Component Value Date    WBC 11.5 05/08/2019      Lab Results   Component Value Date    ANEU 10.3 05/08/2019     Lab Results   Component Value Date     05/08/2019      Results reviewed, labs MET treatment parameters, ok to proceed with treatment.      Post Infusion Assessment:  Patient tolerated injection without incident.       Discharge Plan:   Patient declined prescription refills.  Discharge instructions reviewed with: Patient.  Patient verbalized understanding of discharge instructions and all questions answered.  Copy of AVS reviewed with patient.  Patient will return 5/22/19 for next appointment.  Patient discharged in stable condition accompanied by: self and son.  Departure Mode: Ambulatory with walker.    Senia Juan RN

## 2019-05-09 NOTE — TELEPHONE ENCOUNTER
"POLST has been signed by Dr. Frias. Original mailed to  Dannie Arreola. Copy placed in the \"POLST\" basket  "

## 2019-05-10 NOTE — TELEPHONE ENCOUNTER
Spoke with patient     She said she received Jessica's instructions with regards to warfarin and her new home care will call us on Monday. However she was to have INR rechecked on Monday so asked her what company she has now for home care. She did not know but asked that we call her daughter.     [place] - [phone]    Called patient's daughter [place] BURCIAGA asking for call back     Aruna ROBERSON RN

## 2019-05-10 NOTE — PROGRESS NOTES
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     1. On 09/21/2015, WBC of 4.2, hemoglobin of 13.2 and platelets of 138.    -On 09/29/2015, SPEP does not reveal any M-spike.   -On 10/02/2015, JANET does not reveal any monoclonal protein.     -On 10/22/2015, urine immunofixation reveals monoclonal free kappa light chain.    2. On 05/11/2016, kappa light chain of 50, lambda light chain of 0.32 and ratio of kappa to lambda of 156.2.  3. Bone marrow biopsy on 05/25/2016 reveals 40-50% kappa light chain restricted plasma cells.  Cytogenetics is normal. FISH panel reveals translocation 11;14.    4. MRI of bones on 06/21/2016 and 06/22/2016 reveals myeloma lesions.  5. On 08/24/2016, she was started on revlimid with dexamethasone 20 mg weekly. She did not have any significant response to treatment.   6. Velcade and dexamethasone started on 03/21/2017.    7. Daratumumab added to velcade and dexamethasone on 05/31/2017.   -Velcade given every 14 days starting 08/01/2018.    SUBJECTIVE:  Mrs. Arreola is an 86-year-old female with kappa free light chain multiple myeloma on daratumumab, dexamethasone and Velcade.  For convenience, she gets Velcade every other week.  Overall, she is doing well.  Disease has been stable.      No headache.  No dizziness.  No chest pain.  No shortness of breath at rest.  She gets short of breath on exertion.  No abdominal pain, nausea or vomiting.  She has chronic back pain.  No worsening of it.  No fever, chills, night sweats.      PHYSICAL EXAMINATION:   Alert and oriented x 3. ECOG PS of 2.  EYES:  No icterus.   THROAT:  No ulcer or thrush.   NECK:  Supple. No lymphadenopathy.   AXILLAE:  No lymphadenopathy.   LUNGS:  Good air entry bilaterally.  No crackles or wheezing.   HEART:  Regular.  No murmur.   ABDOMEN:  Soft and nontender.  No mass.   EXTREMITIES: Bilateral pedal edema, improving. No calf swelling or  tenderness.   SKIN: No petechia.    LABORATORY DATA:  Reviewed.      ASSESSMENT:   1.  An 86-year-old female with kappa free light chain multiple myeloma.   2.  Chronic back pain, stable.   3.  Fatigue secondary to her age and myeloma.      PLAN:   1.  Overall, patient's condition is stable.  Her myeloma is stable.  She will continue on Velcade, daratumumab and dexamethasone.  She is tolerating it well.  Side effects reviewed.   2.  For pain, she will continue on oxycodone.  It is controlling her pain well.  I will see her in 4 weeks.  In between, she will see our nurse practitioner.  I advised the patient to see a physician sooner if she has any lump, unexplained pain, weight loss, worsening weakness, shortness of breath, recurrent vomiting, bleeding or any other concerns.         ITZ LOPEZ MD             D: 2019   T: 2019   MT: AS      Name:     ALBERTO PARSON   MRN:      [id number]        Account:      [id number]   :      1932           Visit Date:   2019      Document: [id number]

## 2019-05-13 NOTE — PROGRESS NOTES
Call placed to Yesi Bui, pt's daughter. Per SHELLIE Vance who previously saw pt through [facility], Yesi is local and helps with pt's medical appts. Yesi is listed on consent to communicate. Yesi states they are OK with lab draw here at 7:40am Wednesday. She said they have a new home care agency they are in contact with, but the initial nurse intake visit hasn't been scheduled yet. She will bring home care agency contact info to pt's appt on Wednesday. Otherwise, we could always send lab orders to Dr. Roberts's office for future labs as well. Cinthya Landeros RN on 5/13/2019 at 3:27 PM

## 2019-05-13 NOTE — TELEPHONE ENCOUNTER
Called [facility]     States they are unable to check INR there unless pt gets set up with home INR monitoring. They recommended having the daughter train in this, lives next door to pt.     They asked that we complete home monitoring order form     In the meantime they will check with pt's caregiver to see if she can bring pt into the clinic today, or at the very latest tomorrow    Aruna ROBERSON RN

## 2019-05-13 NOTE — PROGRESS NOTES
No return call from [facility], as I left a message a week ago hoping to get labs arranged at home prior to 5/15/19 OV. I called SHELLIE Vance. She reports that pt was discharged from them a couple of weeks ago, as  doesn't offer transportation services with home care. Family wanted an agency that could provide transportation. She is unsure what agency the family went with.

## 2019-05-15 ENCOUNTER — OFFICE VISIT (OUTPATIENT)
Dept: CARDIOLOGY | Facility: CLINIC | Age: 84
End: 2019-05-15
Attending: NURSE PRACTITIONER
Payer: MEDICARE

## 2019-05-15 VITALS
HEART RATE: 130 BPM | SYSTOLIC BLOOD PRESSURE: 120 MMHG | WEIGHT: 133 LBS | DIASTOLIC BLOOD PRESSURE: 84 MMHG | HEIGHT: 65 IN | BODY MASS INDEX: 22.16 KG/M2

## 2019-05-15 DIAGNOSIS — I34.0 MITRAL VALVE INSUFFICIENCY, UNSPECIFIED ETIOLOGY: ICD-10-CM

## 2019-05-15 DIAGNOSIS — I48.0 PAROXYSMAL ATRIAL FIBRILLATION (H): ICD-10-CM

## 2019-05-15 DIAGNOSIS — I10 BENIGN ESSENTIAL HYPERTENSION: ICD-10-CM

## 2019-05-15 DIAGNOSIS — I50.30 (HFPEF) HEART FAILURE WITH PRESERVED EJECTION FRACTION (H): Primary | ICD-10-CM

## 2019-05-15 LAB
ANION GAP SERPL CALCULATED.3IONS-SCNC: 10.3 MMOL/L (ref 6–17)
BUN SERPL-MCNC: 22 MG/DL (ref 7–30)
CALCIUM SERPL-MCNC: 9.7 MG/DL (ref 8.5–10.5)
CHLORIDE SERPL-SCNC: 104 MMOL/L (ref 98–107)
CO2 SERPL-SCNC: 27 MMOL/L (ref 23–29)
CREAT SERPL-MCNC: 1.04 MG/DL (ref 0.7–1.3)
GFR SERPL CREATININE-BSD FRML MDRD: 50 ML/MIN/{1.73_M2}
GLUCOSE SERPL-MCNC: 129 MG/DL (ref 70–105)
POTASSIUM SERPL-SCNC: 4.3 MMOL/L (ref 3.5–5.1)
SODIUM SERPL-SCNC: 137 MMOL/L (ref 136–145)

## 2019-05-15 PROCEDURE — 80048 BASIC METABOLIC PNL TOTAL CA: CPT | Performed by: NURSE PRACTITIONER

## 2019-05-15 PROCEDURE — 36415 COLL VENOUS BLD VENIPUNCTURE: CPT | Performed by: NURSE PRACTITIONER

## 2019-05-15 PROCEDURE — 99214 OFFICE O/P EST MOD 30 MIN: CPT | Performed by: NURSE PRACTITIONER

## 2019-05-15 PROCEDURE — 93000 ELECTROCARDIOGRAM COMPLETE: CPT | Performed by: NURSE PRACTITIONER

## 2019-05-15 RX ORDER — METOPROLOL SUCCINATE 50 MG/1
150 TABLET, EXTENDED RELEASE ORAL 2 TIMES DAILY
Qty: 180 TABLET | Refills: 3 | Status: ON HOLD | OUTPATIENT
Start: 2019-05-15 | End: 2019-07-07

## 2019-05-15 RX ORDER — DILTIAZEM HYDROCHLORIDE 180 MG/1
180 CAPSULE, EXTENDED RELEASE ORAL DAILY
Qty: 90 CAPSULE | Refills: 1 | Status: SHIPPED | OUTPATIENT
Start: 2019-05-15 | End: 2019-06-26

## 2019-05-15 RX ORDER — POTASSIUM CHLORIDE 1.5 G/1.58G
20 POWDER, FOR SOLUTION ORAL 2 TIMES DAILY
Qty: 60 PACKET | Refills: 1 | Status: ON HOLD | OUTPATIENT
Start: 2019-05-15 | End: 2019-07-07

## 2019-05-15 RX ORDER — FUROSEMIDE 40 MG
40 TABLET ORAL DAILY
Qty: 60 TABLET | Refills: 1 | Status: ON HOLD | OUTPATIENT
Start: 2019-05-15 | End: 2019-07-07

## 2019-05-15 ASSESSMENT — MIFFLIN-ST. JEOR: SCORE: 1044.16

## 2019-05-15 NOTE — PROGRESS NOTES
Cardiology Clinic Progress Note  Amira Arreola MRN# [id number]   YOB: 1932 Age: 86 year old   Primary Cardiologist: Dr. Rivera Reason for visit: CORE follow up            Assessment and Plan:   Amira Arreola is a very pleasant 86 year old female with a history of HFpEF, chronic atrial fibrillation, hypertension, and hx of multiple myeloma mets to bone (dx 2016, currently being treated with Daratumab, Velcade, and Dexamethasone every 2 weeks). Hospitalized 3/22/19-3/28/19 presented with dyspnea, leg swelling and intermittent palpitations. Patient was found to have atrial fibrillation with RVR and acute diastolic heart failure exacerbation. Patient discharged home from TCU on 4/11/19. Patient here today for CORE follow up.    1.  Chronic diastolic heart failure/HFpEF - Echocardiogram completed 3/23/19 LVEF 55-60%, no wall motion abnormalities, moderate mitral regurgitation, moderate tricuspid regurgitation, and severe pulmonary hypertension. Weight today 135#, which is slightly up from prior visits 125-128#. Home weights also appear to be slightly up. Patient appears compensated and mildly volume up on exam. Labs from today show stable kidney function and electrolytes, kidney function is trending up (creatinine 1.04 today), will continue to closely monitor.     - NYHA class III, stage C   - Fluid status : euvolemic   - Diuretic regimen : INCREASE furosemide to 40mg BID x 2 days, then resume furosemide to 40mg daily   - Aldosterone antagonist : will consider in the future if worsening symptoms   - Blood pressure : controlled   - Reinforced HF education, reviewed low sodium diet and reading labels today.    - Currently monitoring weight closely at home and supportive/involved children, reinforced the importance of calling for weight changes. If difficulties with managing weight/fluids will enroll in telehealth tracker.    - CORE RN to call next week to check on weight/symptoms    2. Chronic atrial  fibrillation - stable, asymptomatic, elevated heart rates today in clinic which may be contributing to mildly volume overload and worsening fatigue.    - INCREASE diltiazem to 180mg daily   - Continue metoprolol XL 150mg BID   - [id number] score 5 (HTN, HF, age, female)   - Continue warfarin for thromboembolic prophylaxis.     3. Hypertension - controlled, continue current medications.     4. Moderate mitral regurgitation, moderate tricuspid regurgitation   - Will consider repeat echocardiogram in the future when patient is euvolemic or if worsening symptoms, currently won't change any management clinically.      5. Severe pulmonary hypertension - likely secondary to HFpEF and likely improved with diuresis.    - Will consider repeat echocardiogram in the future when patient is euvolemic.     5. Multiple myeloma with mets to bone - follows with Dr. Roberts   - Started chemo therapy on 4/11/19, on 2 week cycles.         Changes today: INCREASE diltiazem to 180mg daily, INCREASE furosemide to 40mg BID x 2 days, then resume furosemide to 40mg daily    Follow up plan:     CORE followup with Elisabeth in 1 month with labs.     CORE RN to call next week to check on weight/symptoms.         History of Presenting Illness:    Amira Arreola is a very pleasant 86 year old female with a history of HFpEF, chronic atrial fibrillation, hypertension, and hx of multiple myeloma mets to bone (dx 2016, currently being treated with Daratumab, Velcade, and Dexamethasone every 2 weeks).     Hospitalized 3/22/19-3/28/19 presented with dyspnea, leg swelling and intermittent palpitations. Patient was found to have atrial fibrillation with RVR and acute diastolic heart failure exacerbation. BNP elevated at 4828, CXR with bilateral moderate effusions with infiltrates vs. Atelectasis. Echocardiogram completed 3/23/19 LVEF 55-60%, no wall motion abnormalities, moderate mitral regurgitation, moderate tricuspid regurgitation, and severe pulmonary  "hypertension. Oral diltiazem started and metoprolol succinate escalated while hospitalized to help with rate control. Diuresed with IV furosemide and discharged on PO furosemide 40mg BID. Weight 3/24/19 147# (doesn't appear admission weight was completed). Discharge weight 135#. Discharged to TCU.     During last CORE visit on 4/17/19 patient was compensated and euvolemic. No medication changes were made and she was continued on furosemide 40mg daily.    Patient is here today for CORE followup. Patient reports not feeling great, feeling fatigued today. Monitoring weights dailys at home. Has been ranging 129-133#, which is slightly up from prior reports of 126-127#. Denies lower extremity edema. Denies abdominal distention/bloating. Denies shortness of breath at rest. Patient notes she gets anxious about clinic appointments. Denies exertional dyspnea with current levels of activity, notes she is able to do the same amounts of things at home. Did note dyspnea today and requested wheelchair to get back to clinic room. Does note some fatigue after 1 flight of stairs, denies dyspnea. Sleeping good. Denies orthopnea or PND. Sleepign with 1 pillow. Patient denies chest pain or chest tightness. Denies dizziness, lightheadedness or other presyncopal symptoms. Denies tachycardia or palpitations. Denies bleeding episodes. Some complaints of chronic back pain. Restarted chemo therapy on 4/11/19 and now on 2 week cycles. [facility] RN coming to the house. Daughter setting up pill boxes. Using a walker for assitance.    Labs from show stable kidney function, electrolytes stable, renal function is trending up with a creatinine of 1.04 today, ? From slight volume overload, will continue to monitor closely. Blood pressure 120/84 and , recheck 102 in clinic today.     Appetite is good, \"too good\" feels she has been eating more since getting home. Son helps with dinner. Eating meals at home. Not adding salt to foods. Patient reports " drinking < 2L daily. No tobacco or alcohol use. Living with her son at home and daughter living next door.         Recent Hospitalizations   3/22/19-3/28/19 presented with dyspnea, leg swelling and intermittent palpitations. Patient was found to have atrial fibrillation with RVR and acute diastolic heart failure exacerbation. Weight 3/24/19 147# (doesn't appear admission weight was completed). Discharge weight 135#.        Social History    , 6 children, Enjoys keeping the house clean and orderly. Retired nurse.   Social History     Socioeconomic History     Marital status:      Spouse name: Tom     Number of children: 6     Years of education: Not on file     Highest education level: Not on file   Occupational History     Occupation: rn anesthetist     Employer: RETIRED   Social Needs     Financial resource strain: Not on file     Food insecurity:     Worry: Not on file     Inability: Not on file     Transportation needs:     Medical: Not on file     Non-medical: Not on file   Tobacco Use     Smoking status: Never Smoker     Smokeless tobacco: Never Used   Substance and Sexual Activity     Alcohol use: No     Alcohol/week: 0.0 oz     Drug use: No     Sexual activity: Never   Lifestyle     Physical activity:     Days per week: Not on file     Minutes per session: Not on file     Stress: Not on file   Relationships     Social connections:     Talks on phone: Not on file     Gets together: Not on file     Attends Jew service: Not on file     Active member of club or organization: Not on file     Attends meetings of clubs or organizations: Not on file     Relationship status: Not on file     Intimate partner violence:     Fear of current or ex partner: Not on file     Emotionally abused: Not on file     Physically abused: Not on file     Forced sexual activity: Not on file   Other Topics Concern     Parent/sibling w/ CABG, MI or angioplasty before 65F 55M? Not Asked   Social History Narrative      "Not on file            Review of Systems:   Skin:  Positive for bruising   Eyes:  Positive for glasses  ENT:  Negative    Respiratory:  Negative    Cardiovascular:  Negative edema;Positive for  Gastroenterology: Positive for constipation  Genitourinary:  Negative    Musculoskeletal:  Positive for arthritis  Neurologic:  Negative    Psychiatric:  Positive for anxiety  Heme/Lymph/Imm:  Positive for    Endocrine:  Negative           Physical Exam:   Vitals: /84   Pulse 130   Ht 1.651 m (5' 5\")   Wt 60.3 kg (133 lb)   BMI 22.13 kg/m     Wt Readings from Last 4 Encounters:   05/15/19 60.3 kg (133 lb)   05/08/19 60.2 kg (132 lb 11.5 oz)   05/08/19 60.2 kg (132 lb 12.8 oz)   04/24/19 59.4 kg (131 lb)     GEN: well nourished, in no acute distress.  HEENT:  Pupils equal, round. Sclerae nonicteric.   NECK: Supple, no masses appreciated. No JVD appreciated on exam at 90 degrees  C/V:  Irregularly irregular rate and rhythm, no murmur, rub or gallop.   RESP: Respirations are unlabored. Clear bilaterally. Diminished in bases.   GI: Abdomen distended, nontender.  EXTREM: +1 bilateral LE edema   NEURO: Alert and cooperative.  SKIN: Warm and dry.        Data:   ECHO 3/23/19  The left ventricle is normal in size.  The visual ejection fraction is estimated at 55-60%.  No regional wall motion abnormalities noted.  There is moderate (2+) mitral regurgitation.  There is moderate (2+) tricuspid regurgitation.  Severe (>55mmHg) pulmonary hypertension is present.  The rhythm was rapid atrial fibrillation.    LIPID RESULTS:  Lab Results   Component Value Date    CHOL 160 10/12/2016    HDL 76 10/12/2016    LDL 71 10/12/2016    TRIG 66 10/12/2016    CHOLHDLRATIO 2.3 09/21/2015     LIVER ENZYME RESULTS:  Lab Results   Component Value Date    AST 19 04/24/2019    ALT 23 04/24/2019     CBC RESULTS:  Lab Results   Component Value Date    WBC 11.5 (H) 05/08/2019    RBC 3.78 (L) 05/08/2019    HGB 11.8 05/08/2019    HCT 36.4 05/08/2019    " MCV 96 05/08/2019    MCH 31.2 05/08/2019    MCHC 32.4 05/08/2019    RDW 16.1 (H) 05/08/2019     05/08/2019     BMP RESULTS:  Lab Results   Component Value Date     05/15/2019    POTASSIUM 4.3 05/15/2019    CHLORIDE 104 05/15/2019    CO2 27 05/15/2019    ANIONGAP 10.3 05/15/2019     (H) 05/15/2019    BUN 22 05/15/2019    CR 1.04 05/15/2019    GFRESTIMATED 50 (L) 05/15/2019    GFRESTBLACK 61 05/15/2019    BRADLEY 9.7 05/15/2019      INR RESULTS:  Lab Results   Component Value Date    INR 1.54 (H) 05/08/2019    INR 2.18 (H) 04/24/2019            Medications     Current Outpatient Medications   Medication Sig Dispense Refill     Acetaminophen (TYLENOL PO) Take 500 mg by mouth every 6 hours as needed for mild pain or fever       acyclovir (ZOVIRAX) 400 MG tablet Take 1 tablet (400 mg) by mouth 2 times daily 60 tablet 1     Carboxymethylcellulose Sod PF (REFRESH PLUS) 0.5 % SOLN ophthalmic solution 1 drop 4 times daily as needed for dry eyes       Dexamethasone (DECADRON PO) Take by mouth See Admin Instructions Give 20 mg every wed.       dexamethasone (DECADRON) 4 MG tablet Take 20mg (5 tablets) by mouth every week.. 28 tablet 0     diltiazem ER (DILT-XR) 120 MG 24 hr capsule Take 1 capsule (120 mg) by mouth daily 30 capsule 1     furosemide (LASIX) 40 MG tablet Take 1 tablet (40 mg) by mouth daily 60 tablet 1     latanoprost (XALATAN) 0.005 % ophthalmic solution Place 1 drop into the right eye daily   6     lidocaine-prilocaine (EMLA) cream Apply to port site 1 hour prior to access 30 g 1     metoprolol succinate ER (TOPROL-XL) 50 MG 24 hr tablet Take 3 tablets (150 mg) by mouth 2 times daily 180 tablet 3     Multiple Vitamin (DAILY MULTIVITAMIN PO) Take 1 tablet by mouth daily.       oxyCODONE (ROXICODONE) 5 MG tablet Take 1 tablet (5 mg) by mouth every 4 hours as needed for pain maximum 4 tablet(s) per day 30 tablet 0     polyethylene glycol (MIRALAX/GLYCOLAX) powder Take 17 g by mouth daily as needed         potassium chloride (KLOR-CON) 20 MEQ packet Take 20 mEq by mouth 2 times daily 60 packet 1     prochlorperazine (COMPAZINE) 10 MG tablet Take 1 tablet (10 mg) by mouth every 6 hours as needed for nausea or vomiting 30 tablet 1     SIMBRINZA 1-0.2 % ophthalmic suspension Place 1 drop into the right eye 2 times daily 1 drop AM and PM  2     Sodium Fluoride (SF 5000 PLUS) 1.1 % CREA Apply to affected area 3 times daily       vitamin D3 (VITAMIN D3) 1000 units (25 mcg) tablet Take 1,000 Units by mouth daily       warfarin (COUMADIN) 4 MG tablet TAKE ONE TO TWO TABLETS BY MOUTH EVERY DAY AS DIRECTED BY INR CLINIC 180 tablet 0          Past Medical History     Past Medical History:   Diagnosis Date     Abnormal CXR 2018    then ct done and not significant     Acute diastolic heart failure (H) 03/22/2019    nl ef, 2+mr and tr with severe pulm htn     Ascending aorta dilatation (H) 04/2016    on echo, mild, fu 7/18 4.0, slightly larger     Cancer, metastatic to bone (H)     due to myeloma     Colonic polyp 2008    adenomatous, fu 2013 tics only     Compression fracture 2016    multiple areas of spine     Dry eyes      Elevated MCV 2015    b12 and folic acid nl     HTN (hypertension) 2000    off meds for years     Lung nodule 08/2018    on ct, 4mm, ct done for fu abnl cxr     Menorrhagia 2002    hysteroscopy and d and c done     MGUS (monoclonal gammopathy of unknown significance) 2015    eval by Dr. Roberts     Multiple myeloma (H) 2016    dx 5/16 at [place], bone lesions seen on mri 6/16     OAB (overactive bladder) 2013    Dr. Grullon     Osteoporosis     fu done 2010 and stable, went off meds then, fu done 2013; has had gyn fu and added evista 2013 by gyn     Palpitations 4/16    nl echo, mildly dilated asc aorta     Paroxysmal atrial fibrillation (H) 4/16    had palp and ziopatch showed it, echo nl lv fxn, mild mr and tr, added coum and toprol, toprol dose raised 12/22/16     Pulmonary hypertension (H) 03/2019     seen on echo     Sciatica of left side     Dr. Helen Ricardo      SVT (supraventricular tachycardia) (H)     on ziopatch     Thrombocytopenia (H)      Past Surgical History:   Procedure Laterality Date     BONE MARROW BIOPSY, BONE SPECIMEN, NEEDLE/TROCAR N/A 2016    Procedure: BIOPSY BONE MARROW;  Surgeon: Bryan Patel MD;  Location:  GI     CATARACT IOL, RT/LT        SECTION  1965,      COLONOSCOPY  2013    Procedure: COLONOSCOPY;  COLONOSCOPY;  Surgeon: Steffany Rockwell MD;  Location:  GI     EXCISE EXOSTOSIS TIBIA / FIBULA  2014    Procedure: EXCISE EXOSTOSIS TIBIA / FIBULA;  Surgeon: Naila Pichardo MD;  Location:  [facility]     hysteroscopy and d and c      due to bleeding     left anle replacement       right ankle surgery       Family History   Problem Relation Age of Onset     Heart Disease Father      C.A.D. Mother      Cerebrovascular Disease Brother      Family History Negative Sister      Family History Negative Sister      Family History Negative Brother             Allergies   Blood transfusion related (informational only) and Penicillin [penicillins]        [facility]  Pager: [phone]

## 2019-05-15 NOTE — PATIENT INSTRUCTIONS
Call CORE nurse for any questions or concerns Mon-Fri 8am-4pm:                         [phone]                For concerns after hours:                          [phone]     Medication changes:     INCREASE diltiazem to 180mg daily    INCREASE furosemide to 40mg (1 tablet) 2 x a day for 2 days (Wednesday and Thursday). Starting Friday resume 40mg (1 tablet) daily.   Plan from today: Follow up with Elisabeth in 1 month.   Lab results: see attached; stable kidney function/electrolytes.

## 2019-05-15 NOTE — LETTER
5/15/2019    Addy Frias MD  [address]  [facility] [address]    RE: Amira Arreola       Dear Colleague,    I had the pleasure of seeing Amira Arreola in the [facility].    Cardiology Clinic Progress Note  Amira Arreola MRN# [id number]   YOB: 1932 Age: 86 year old   Primary Cardiologist: Dr. Rivera Reason for visit: CORE follow up            Assessment and Plan:   Amira Arreola is a very pleasant 86 year old female with a history of HFpEF, chronic atrial fibrillation, hypertension, and hx of multiple myeloma mets to bone (dx 2016, currently being treated with Daratumab, Velcade, and Dexamethasone every 2 weeks). Hospitalized 3/22/19-3/28/19 presented with dyspnea, leg swelling and intermittent palpitations. Patient was found to have atrial fibrillation with RVR and acute diastolic heart failure exacerbation. Patient discharged home from TCU on 4/11/19. Patient here today for CORE follow up.    1.  Chronic diastolic heart failure/HFpEF - Echocardiogram completed 3/23/19 LVEF 55-60%, no wall motion abnormalities, moderate mitral regurgitation, moderate tricuspid regurgitation, and severe pulmonary hypertension. Weight today 135#, which is slightly up from prior visits 125-128#. Home weights also appear to be slightly up. Patient appears compensated and mildly volume up on exam. Labs from today show stable kidney function and electrolytes, kidney function is trending up (creatinine 1.04 today), will continue to closely monitor.     - NYHA class III, stage C   - Fluid status : euvolemic   - Diuretic regimen : INCREASE furosemide to 40mg BID x 2 days, then resume furosemide to 40mg daily   - Aldosterone antagonist : will consider in the future if worsening symptoms   - Blood pressure : controlled   - Reinforced HF education, reviewed low sodium diet and reading labels today.    - Currently monitoring weight closely at home and supportive/involved  children, reinforced the importance of calling for weight changes. If difficulties with managing weight/fluids will enroll in telehealth tracker.    - CORE RN to call next week to check on weight/symptoms    2. Chronic atrial fibrillation - stable, asymptomatic, elevated heart rates today in clinic which may be contributing to mildly volume overload and worsening fatigue.    - INCREASE diltiazem to 180mg daily   - Continue metoprolol XL 150mg BID   - [id number] score 5 (HTN, HF, age, female)   - Continue warfarin for thromboembolic prophylaxis.     3. Hypertension - controlled, continue current medications.     4. Moderate mitral regurgitation, moderate tricuspid regurgitation   - Will consider repeat echocardiogram in the future when patient is euvolemic or if worsening symptoms, currently won't change any management clinically.      5. Severe pulmonary hypertension - likely secondary to HFpEF and likely improved with diuresis.    - Will consider repeat echocardiogram in the future when patient is euvolemic.     5. Multiple myeloma with mets to bone - follows with Dr. Roberts   - Started chemo therapy on 4/11/19, on 2 week cycles.         Changes today: INCREASE diltiazem to 180mg daily, INCREASE furosemide to 40mg BID x 2 days, then resume furosemide to 40mg daily    Follow up plan:     CORE followup with Elisabeth in 1 month with labs.     CORE RN to call next week to check on weight/symptoms.         History of Presenting Illness:    Amira Arreola is a very pleasant 86 year old female with a history of HFpEF, chronic atrial fibrillation, hypertension, and hx of multiple myeloma mets to bone (dx 2016, currently being treated with Daratumab, Velcade, and Dexamethasone every 2 weeks).     Hospitalized 3/22/19-3/28/19 presented with dyspnea, leg swelling and intermittent palpitations. Patient was found to have atrial fibrillation with RVR and acute diastolic heart failure exacerbation. BNP elevated at 4828, CXR with  bilateral moderate effusions with infiltrates vs. Atelectasis. Echocardiogram completed 3/23/19 LVEF 55-60%, no wall motion abnormalities, moderate mitral regurgitation, moderate tricuspid regurgitation, and severe pulmonary hypertension. Oral diltiazem started and metoprolol succinate escalated while hospitalized to help with rate control. Diuresed with IV furosemide and discharged on PO furosemide 40mg BID. Weight 3/24/19 147# (doesn't appear admission weight was completed). Discharge weight 135#. Discharged to TCU.     During last CORE visit on 4/17/19 patient was compensated and euvolemic. No medication changes were made and she was continued on furosemide 40mg daily.    Patient is here today for CORE followup. Patient reports not feeling great, feeling fatigued today. Monitoring weights dailys at home. Has been ranging 129-133#, which is slightly up from prior reports of 126-127#. Denies lower extremity edema. Denies abdominal distention/bloating. Denies shortness of breath at rest. Patient notes she gets anxious about clinic appointments. Denies exertional dyspnea with current levels of activity, notes she is able to do the same amounts of things at home. Did note dyspnea today and requested wheelchair to get back to clinic room. Does note some fatigue after 1 flight of stairs, denies dyspnea. Sleeping good. Denies orthopnea or PND. Sleepign with 1 pillow. Patient denies chest pain or chest tightness. Denies dizziness, lightheadedness or other presyncopal symptoms. Denies tachycardia or palpitations. Denies bleeding episodes. Some complaints of chronic back pain. Restarted chemo therapy on 4/11/19 and now on 2 week cycles. [facility] RN coming to the house. Daughter setting up pill boxes. Using a walker for assitance.    Labs from show stable kidney function, electrolytes stable, renal function is trending up with a creatinine of 1.04 today, ? From slight volume overload, will continue to monitor closely. Blood  "pressure 120/84 and , recheck 102 in clinic today.     Appetite is good, \"too good\" feels she has been eating more since getting home. Son helps with dinner. Eating meals at home. Not adding salt to foods. Patient reports drinking < 2L daily. No tobacco or alcohol use. Living with her son at home and daughter living next door.         Recent Hospitalizations   3/22/19-3/28/19 presented with dyspnea, leg swelling and intermittent palpitations. Patient was found to have atrial fibrillation with RVR and acute diastolic heart failure exacerbation. Weight 3/24/19 147# (doesn't appear admission weight was completed). Discharge weight 135#.        Social History    , 6 children, Enjoys keeping the house clean and orderly. Retired nurse.   Social History     Socioeconomic History     Marital status:      Spouse name: Tom     Number of children: 6     Years of education: Not on file     Highest education level: Not on file   Occupational History     Occupation: rn anesthetist     Employer: RETIRED   Social Needs     Financial resource strain: Not on file     Food insecurity:     Worry: Not on file     Inability: Not on file     Transportation needs:     Medical: Not on file     Non-medical: Not on file   Tobacco Use     Smoking status: Never Smoker     Smokeless tobacco: Never Used   Substance and Sexual Activity     Alcohol use: No     Alcohol/week: 0.0 oz     Drug use: No     Sexual activity: Never   Lifestyle     Physical activity:     Days per week: Not on file     Minutes per session: Not on file     Stress: Not on file   Relationships     Social connections:     Talks on phone: Not on file     Gets together: Not on file     Attends Judaism service: Not on file     Active member of club or organization: Not on file     Attends meetings of clubs or organizations: Not on file     Relationship status: Not on file     Intimate partner violence:     Fear of current or ex partner: Not on file     " "Emotionally abused: Not on file     Physically abused: Not on file     Forced sexual activity: Not on file   Other Topics Concern     Parent/sibling w/ CABG, MI or angioplasty before 65F 55M? Not Asked   Social History Narrative     Not on file            Review of Systems:   Skin:  Positive for bruising   Eyes:  Positive for glasses  ENT:  Negative    Respiratory:  Negative    Cardiovascular:  Negative edema;Positive for  Gastroenterology: Positive for constipation  Genitourinary:  Negative    Musculoskeletal:  Positive for arthritis  Neurologic:  Negative    Psychiatric:  Positive for anxiety  Heme/Lymph/Imm:  Positive for    Endocrine:  Negative           Physical Exam:   Vitals: /84   Pulse 130   Ht 1.651 m (5' 5\")   Wt 60.3 kg (133 lb)   BMI 22.13 kg/m      Wt Readings from Last 4 Encounters:   05/15/19 60.3 kg (133 lb)   05/08/19 60.2 kg (132 lb 11.5 oz)   05/08/19 60.2 kg (132 lb 12.8 oz)   04/24/19 59.4 kg (131 lb)     GEN: well nourished, in no acute distress.  HEENT:  Pupils equal, round. Sclerae nonicteric.   NECK: Supple, no masses appreciated. No JVD appreciated on exam at 90 degrees  C/V:  Irregularly irregular rate and rhythm, no murmur, rub or gallop.   RESP: Respirations are unlabored. Clear bilaterally. Diminished in bases.   GI: Abdomen distended, nontender.  EXTREM: +1 bilateral LE edema   NEURO: Alert and cooperative.  SKIN: Warm and dry.        Data:   ECHO 3/23/19  The left ventricle is normal in size.  The visual ejection fraction is estimated at 55-60%.  No regional wall motion abnormalities noted.  There is moderate (2+) mitral regurgitation.  There is moderate (2+) tricuspid regurgitation.  Severe (>55mmHg) pulmonary hypertension is present.  The rhythm was rapid atrial fibrillation.    LIPID RESULTS:  Lab Results   Component Value Date    CHOL 160 10/12/2016    HDL 76 10/12/2016    LDL 71 10/12/2016    TRIG 66 10/12/2016    CHOLHDLRATIO 2.3 09/21/2015     LIVER ENZYME " RESULTS:  Lab Results   Component Value Date    AST 19 04/24/2019    ALT 23 04/24/2019     CBC RESULTS:  Lab Results   Component Value Date    WBC 11.5 (H) 05/08/2019    RBC 3.78 (L) 05/08/2019    HGB 11.8 05/08/2019    HCT 36.4 05/08/2019    MCV 96 05/08/2019    MCH 31.2 05/08/2019    MCHC 32.4 05/08/2019    RDW 16.1 (H) 05/08/2019     05/08/2019     BMP RESULTS:  Lab Results   Component Value Date     05/15/2019    POTASSIUM 4.3 05/15/2019    CHLORIDE 104 05/15/2019    CO2 27 05/15/2019    ANIONGAP 10.3 05/15/2019     (H) 05/15/2019    BUN 22 05/15/2019    CR 1.04 05/15/2019    GFRESTIMATED 50 (L) 05/15/2019    GFRESTBLACK 61 05/15/2019    BRADLEY 9.7 05/15/2019      INR RESULTS:  Lab Results   Component Value Date    INR 1.54 (H) 05/08/2019    INR 2.18 (H) 04/24/2019            Medications     Current Outpatient Medications   Medication Sig Dispense Refill     Acetaminophen (TYLENOL PO) Take 500 mg by mouth every 6 hours as needed for mild pain or fever       acyclovir (ZOVIRAX) 400 MG tablet Take 1 tablet (400 mg) by mouth 2 times daily 60 tablet 1     Carboxymethylcellulose Sod PF (REFRESH PLUS) 0.5 % SOLN ophthalmic solution 1 drop 4 times daily as needed for dry eyes       Dexamethasone (DECADRON PO) Take by mouth See Admin Instructions Give 20 mg every wed.       dexamethasone (DECADRON) 4 MG tablet Take 20mg (5 tablets) by mouth every week.. 28 tablet 0     diltiazem ER (DILT-XR) 120 MG 24 hr capsule Take 1 capsule (120 mg) by mouth daily 30 capsule 1     furosemide (LASIX) 40 MG tablet Take 1 tablet (40 mg) by mouth daily 60 tablet 1     latanoprost (XALATAN) 0.005 % ophthalmic solution Place 1 drop into the right eye daily   6     lidocaine-prilocaine (EMLA) cream Apply to port site 1 hour prior to access 30 g 1     metoprolol succinate ER (TOPROL-XL) 50 MG 24 hr tablet Take 3 tablets (150 mg) by mouth 2 times daily 180 tablet 3     Multiple Vitamin (DAILY MULTIVITAMIN PO) Take 1 tablet by  mouth daily.       oxyCODONE (ROXICODONE) 5 MG tablet Take 1 tablet (5 mg) by mouth every 4 hours as needed for pain maximum 4 tablet(s) per day 30 tablet 0     polyethylene glycol (MIRALAX/GLYCOLAX) powder Take 17 g by mouth daily as needed        potassium chloride (KLOR-CON) 20 MEQ packet Take 20 mEq by mouth 2 times daily 60 packet 1     prochlorperazine (COMPAZINE) 10 MG tablet Take 1 tablet (10 mg) by mouth every 6 hours as needed for nausea or vomiting 30 tablet 1     SIMBRINZA 1-0.2 % ophthalmic suspension Place 1 drop into the right eye 2 times daily 1 drop AM and PM  2     Sodium Fluoride (SF 5000 PLUS) 1.1 % CREA Apply to affected area 3 times daily       vitamin D3 (VITAMIN D3) 1000 units (25 mcg) tablet Take 1,000 Units by mouth daily       warfarin (COUMADIN) 4 MG tablet TAKE ONE TO TWO TABLETS BY MOUTH EVERY DAY AS DIRECTED BY INR CLINIC 180 tablet 0          Past Medical History     Past Medical History:   Diagnosis Date     Abnormal CXR 2018    then ct done and not significant     Acute diastolic heart failure (H) 03/22/2019    nl ef, 2+mr and tr with severe pulm htn     Ascending aorta dilatation (H) 04/2016    on echo, mild, fu 7/18 4.0, slightly larger     Cancer, metastatic to bone (H)     due to myeloma     Colonic polyp 2008    adenomatous, fu 2013 tics only     Compression fracture 2016    multiple areas of spine     Dry eyes      Elevated MCV 2015    b12 and folic acid nl     HTN (hypertension) 2000    off meds for years     Lung nodule 08/2018    on ct, 4mm, ct done for fu abnl cxr     Menorrhagia 2002    hysteroscopy and d and c done     MGUS (monoclonal gammopathy of unknown significance) 2015    eval by Dr. Roberts     Multiple myeloma (H) 2016    dx 5/16 at [facility], bone lesions seen on mri 6/16     OAB (overactive bladder) 2013    Dr. Grullon     Osteoporosis     fu done 2010 and stable, went off meds then, fu done 2013; has had gyn fu and added evista 2013 by gyn     Palpitations 4/16     nl echo, mildly dilated asc aorta     Paroxysmal atrial fibrillation (H)     had palp and ziopatch showed it, echo nl lv fxn, mild mr and tr, added coum and toprol, toprol dose raised 16     Pulmonary hypertension (H) 2019    seen on echo     Sciatica of left side     Dr. Helen Ricardo      SVT (supraventricular tachycardia) (H)     on ziopatch     Thrombocytopenia (H)      Past Surgical History:   Procedure Laterality Date     BONE MARROW BIOPSY, BONE SPECIMEN, NEEDLE/TROCAR N/A 2016    Procedure: BIOPSY BONE MARROW;  Surgeon: Bryan Patel MD;  Location:  GI     CATARACT IOL, RT/LT        SECTION  ,      COLONOSCOPY  2013    Procedure: COLONOSCOPY;  COLONOSCOPY;  Surgeon: Steffany Rockwell MD;  Location:  GI     EXCISE EXOSTOSIS TIBIA / FIBULA  2014    Procedure: EXCISE EXOSTOSIS TIBIA / FIBULA;  Surgeon: Naila Pichardo MD;  Location:  [facility]     hysteroscopy and d and c      due to bleeding     left anle replacement       right ankle surgery       Family History   Problem Relation Age of Onset     Heart Disease Father      C.A.D. Mother      Cerebrovascular Disease Brother      Family History Negative Sister      Family History Negative Sister      Family History Negative Brother             Allergies   Blood transfusion related (informational only) and Penicillin [penicillins]        DAYSI Arellano CNP  [facility]  Pager: [phone]      Thank you for allowing me to participate in the care of your patient.    Sincerely,     DAYSI Arellano CNP     [facility]

## 2019-05-17 ENCOUNTER — TELEPHONE (OUTPATIENT)
Dept: NURSING | Facility: CLINIC | Age: 84
End: 2019-05-17

## 2019-05-17 ENCOUNTER — NURSE TRIAGE (OUTPATIENT)
Dept: NURSING | Facility: CLINIC | Age: 84
End: 2019-05-17

## 2019-05-17 NOTE — TELEPHONE ENCOUNTER
Called and spoke to pt's daughter. Pt's daughter states that she is already taking her Dad to [place] today for an appointment at 2 pm. Daughter is unable to bring pt in today at 2 pm to see . Suggested that she bring pt to an Urgent Care or perhaps the clinic in [place] can see her mother as well if she calls them to ask?Daughter will follow up with suggestions.    Daughter aware that pt should be seen sooner rather than later to evaluate cellulitis versus fungal infection.

## 2019-05-17 NOTE — TELEPHONE ENCOUNTER
Rash started on bottom and under breasts and was found by home health aide. RN called the daughter who wants input before the weekend. Please call her. Unable to triage since she's not with her Mom.  [phone].  Epic encounter sent to the patient's clinic.    Christie Crabtree RN-[facility]

## 2019-05-17 NOTE — TELEPHONE ENCOUNTER
To PCP:     Derm Problem reported- sounds like this may be fungal.     Can pt have RX for Nystatin powder/cream to try over the weekend, call on Monday if no improvement or worsening?      Difficult to triage as daughter is not with patient--caregiver noted rash today    S: Rash under breasts/bottom    A:   Onset: Wednesday  Description: Pus-like/moist     Pharmacy is pended.     Thank you,   Kat WHITTAKER RN

## 2019-05-17 NOTE — TELEPHONE ENCOUNTER
Rash started on bottom and under breasts and was found by home health aide. RN called the daughter who wants input before the weekend. Please call her. Unable to triage since she's not with her Mom.  [phone].  Christie Crabtree RN-[facility]

## 2019-05-20 ENCOUNTER — DOCUMENTATION ONLY (OUTPATIENT)
Dept: OTHER | Facility: CLINIC | Age: 84
End: 2019-05-20

## 2019-05-21 ENCOUNTER — CARE COORDINATION (OUTPATIENT)
Dept: CARDIOLOGY | Facility: CLINIC | Age: 84
End: 2019-05-21

## 2019-05-21 NOTE — PROGRESS NOTES
Per OV note 5/15/19 by Elisabeth Means CNP:    Changes today: INCREASE diltiazem to 180mg daily, INCREASE furosemide to 40mg BID x 2 days, then resume furosemide to 40mg daily     Follow up plan:      CORE followup with Elisabeth in 1 month with labs.      CORE RN to call next week to check on weight/symptoms.       Call to pt for update on wt/symptoms. Pt reports her weight vacillates within ~3#, range of  128-133#. This is the same weight range reported at recent OV with Elisabeth on 5/15/19. Pt is undergoing chemo on every 2 week cycles; she said her weight fluctuations correlate with how her appetite is and whether she's able to eat or not. Pt reports she's feeling her usual self, no increased HF sx, and she reports minimal pedal edema. Pt reports producing adequate amounts of urine. She now has a home health aide to help with bathing and some cares. She reports she's overall doing well and very comfortable at home. Advised pt to call if weight goes up further or any change to cardiac symptoms such as SOB, swelling or needing to prop up on more pillows at night. Pt given RN phone number to call PRN. Cinthya Landeros RN on 5/21/2019 at 1:51 PM

## 2019-05-22 ENCOUNTER — ONCOLOGY VISIT (OUTPATIENT)
Dept: ONCOLOGY | Facility: CLINIC | Age: 84
End: 2019-05-22
Attending: NURSE PRACTITIONER
Payer: MEDICARE

## 2019-05-22 ENCOUNTER — INFUSION THERAPY VISIT (OUTPATIENT)
Dept: INFUSION THERAPY | Facility: CLINIC | Age: 84
End: 2019-05-22
Attending: NURSE PRACTITIONER
Payer: MEDICARE

## 2019-05-22 ENCOUNTER — HOSPITAL ENCOUNTER (OUTPATIENT)
Facility: CLINIC | Age: 84
Setting detail: SPECIMEN
Discharge: HOME | End: 2019-05-22
Attending: NURSE PRACTITIONER | Admitting: INTERNAL MEDICINE
Payer: MEDICARE

## 2019-05-22 VITALS
TEMPERATURE: 98.2 F | WEIGHT: 131.8 LBS | DIASTOLIC BLOOD PRESSURE: 73 MMHG | RESPIRATION RATE: 20 BRPM | BODY MASS INDEX: 21.93 KG/M2 | HEART RATE: 65 BPM | OXYGEN SATURATION: 100 % | SYSTOLIC BLOOD PRESSURE: 115 MMHG

## 2019-05-22 VITALS
RESPIRATION RATE: 20 BRPM | HEART RATE: 65 BPM | OXYGEN SATURATION: 100 % | BODY MASS INDEX: 21.97 KG/M2 | HEIGHT: 65 IN | WEIGHT: 131.84 LBS | DIASTOLIC BLOOD PRESSURE: 73 MMHG | SYSTOLIC BLOOD PRESSURE: 115 MMHG

## 2019-05-22 DIAGNOSIS — C90.00 MULTIPLE MYELOMA NOT HAVING ACHIEVED REMISSION (H): ICD-10-CM

## 2019-05-22 DIAGNOSIS — I48.0 PAROXYSMAL ATRIAL FIBRILLATION (H): ICD-10-CM

## 2019-05-22 DIAGNOSIS — C90.00 MULTIPLE MYELOMA NOT HAVING ACHIEVED REMISSION (H): Primary | ICD-10-CM

## 2019-05-22 LAB
ALBUMIN SERPL-MCNC: 3.2 G/DL (ref 3.4–5)
ALP SERPL-CCNC: 59 U/L (ref 40–150)
ALT SERPL W P-5'-P-CCNC: 34 U/L (ref 0–50)
AST SERPL W P-5'-P-CCNC: 15 U/L (ref 0–45)
BASOPHILS # BLD AUTO: 0 10E9/L (ref 0–0.2)
BASOPHILS NFR BLD AUTO: 0.1 %
BILIRUB DIRECT SERPL-MCNC: 0.1 MG/DL (ref 0–0.2)
BILIRUB SERPL-MCNC: 0.4 MG/DL (ref 0.2–1.3)
DIFFERENTIAL METHOD BLD: ABNORMAL
EOSINOPHIL # BLD AUTO: 0 10E9/L (ref 0–0.7)
EOSINOPHIL NFR BLD AUTO: 0 %
ERYTHROCYTE [DISTWIDTH] IN BLOOD BY AUTOMATED COUNT: 17.1 % (ref 10–15)
HCT VFR BLD AUTO: 36.8 % (ref 35–47)
HGB BLD-MCNC: 12.2 G/DL (ref 11.7–15.7)
IMM GRANULOCYTES # BLD: 0.2 10E9/L (ref 0–0.4)
IMM GRANULOCYTES NFR BLD: 0.9 %
INR PPP: 3.12 (ref 0.86–1.14)
KAPPA LC UR-MCNC: NORMAL MG/DL (ref 0.33–1.94)
KAPPA LC/LAMBDA SER: NORMAL {RATIO} (ref 0.26–1.65)
LAMBDA LC SERPL-MCNC: NORMAL MG/DL (ref 0.57–2.63)
LYMPHOCYTES # BLD AUTO: 0.7 10E9/L (ref 0.8–5.3)
LYMPHOCYTES NFR BLD AUTO: 4.1 %
MCH RBC QN AUTO: 31.3 PG (ref 26.5–33)
MCHC RBC AUTO-ENTMCNC: 33.2 G/DL (ref 31.5–36.5)
MCV RBC AUTO: 94 FL (ref 78–100)
MONOCYTES # BLD AUTO: 1 10E9/L (ref 0–1.3)
MONOCYTES NFR BLD AUTO: 5.6 %
NEUTROPHILS # BLD AUTO: 15.3 10E9/L (ref 1.6–8.3)
NEUTROPHILS NFR BLD AUTO: 89.3 %
NRBC # BLD AUTO: 0 10*3/UL
NRBC BLD AUTO-RTO: 0 /100
PLATELET # BLD AUTO: 190 10E9/L (ref 150–450)
PROT SERPL-MCNC: 5.9 G/DL (ref 6.8–8.8)
RBC # BLD AUTO: 3.9 10E12/L (ref 3.8–5.2)
WBC # BLD AUTO: 17.1 10E9/L (ref 4–11)

## 2019-05-22 PROCEDURE — 80076 HEPATIC FUNCTION PANEL: CPT | Performed by: INTERNAL MEDICINE

## 2019-05-22 PROCEDURE — 25800030 ZZH RX IP 258 OP 636: Performed by: INTERNAL MEDICINE

## 2019-05-22 PROCEDURE — 85610 PROTHROMBIN TIME: CPT | Performed by: INTERNAL MEDICINE

## 2019-05-22 PROCEDURE — 96413 CHEMO IV INFUSION 1 HR: CPT

## 2019-05-22 PROCEDURE — 84165 PROTEIN E-PHORESIS SERUM: CPT | Performed by: INTERNAL MEDICINE

## 2019-05-22 PROCEDURE — 85025 COMPLETE CBC W/AUTO DIFF WBC: CPT | Performed by: INTERNAL MEDICINE

## 2019-05-22 PROCEDURE — 99213 OFFICE O/P EST LOW 20 MIN: CPT | Performed by: NURSE PRACTITIONER

## 2019-05-22 PROCEDURE — 96401 CHEMO ANTI-NEOPL SQ/IM: CPT

## 2019-05-22 PROCEDURE — 84999 UNLISTED CHEMISTRY PROCEDURE: CPT | Performed by: NURSE PRACTITIONER

## 2019-05-22 PROCEDURE — A9270 NON-COVERED ITEM OR SERVICE: HCPCS | Performed by: INTERNAL MEDICINE

## 2019-05-22 PROCEDURE — 00000402 ZZHCL STATISTIC TOTAL PROTEIN: Performed by: INTERNAL MEDICINE

## 2019-05-22 PROCEDURE — 96367 TX/PROPH/DG ADDL SEQ IV INF: CPT

## 2019-05-22 PROCEDURE — 25000128 H RX IP 250 OP 636: Performed by: INTERNAL MEDICINE

## 2019-05-22 PROCEDURE — 96415 CHEMO IV INFUSION ADDL HR: CPT

## 2019-05-22 PROCEDURE — 25000132 ZZH RX MED GY IP 250 OP 250 PS 637: Performed by: INTERNAL MEDICINE

## 2019-05-22 PROCEDURE — 83883 ASSAY NEPHELOMETRY NOT SPEC: CPT | Performed by: NURSE PRACTITIONER

## 2019-05-22 RX ORDER — OXYCODONE HYDROCHLORIDE 5 MG/1
5 TABLET ORAL EVERY 4 HOURS PRN
Qty: 30 TABLET | Refills: 0 | Status: ON HOLD | OUTPATIENT
Start: 2019-05-22 | End: 2019-07-07

## 2019-05-22 RX ORDER — ACETAMINOPHEN 325 MG/1
650 TABLET ORAL ONCE
Status: COMPLETED | OUTPATIENT
Start: 2019-05-22 | End: 2019-05-22

## 2019-05-22 RX ORDER — HEPARIN SODIUM (PORCINE) LOCK FLUSH IV SOLN 100 UNIT/ML 100 UNIT/ML
5 SOLUTION INTRAVENOUS EVERY 8 HOURS
Status: DISCONTINUED | OUTPATIENT
Start: 2019-05-22 | End: 2019-05-22 | Stop reason: HOSPADM

## 2019-05-22 RX ORDER — DEXAMETHASONE 4 MG/1
TABLET ORAL
Qty: 28 TABLET | Refills: 0 | Status: SHIPPED | OUTPATIENT
Start: 2019-05-22 | End: 2019-06-05

## 2019-05-22 RX ADMIN — ACETAMINOPHEN 650 MG: 325 TABLET ORAL at 14:28

## 2019-05-22 RX ADMIN — BORTEZOMIB 2.1 MG: 3.5 INJECTION, POWDER, LYOPHILIZED, FOR SOLUTION INTRAVENOUS; SUBCUTANEOUS at 14:59

## 2019-05-22 RX ADMIN — SODIUM CHLORIDE 250 ML: 9 INJECTION, SOLUTION INTRAVENOUS at 14:28

## 2019-05-22 RX ADMIN — DEXAMETHASONE SODIUM PHOSPHATE 12 MG: 10 INJECTION, SOLUTION INTRAMUSCULAR; INTRAVENOUS at 14:38

## 2019-05-22 RX ADMIN — DARATUMUMAB 1000 MG: 100 INJECTION, SOLUTION, CONCENTRATE INTRAVENOUS at 15:16

## 2019-05-22 RX ADMIN — DIPHENHYDRAMINE HYDROCHLORIDE 50 MG: 50 INJECTION INTRAMUSCULAR; INTRAVENOUS at 14:56

## 2019-05-22 RX ADMIN — HEPARIN SODIUM (PORCINE) LOCK FLUSH IV SOLN 100 UNIT/ML 5 ML: 100 SOLUTION at 16:52

## 2019-05-22 ASSESSMENT — MIFFLIN-ST. JEOR: SCORE: 1038.88

## 2019-05-22 NOTE — PROGRESS NOTES
"Hematology-Oncology Follow-up Note  [facility]  Today's Date: 05/22/2019        ASSESSMENT/ PLAN :  Amira Arreola is a 86 year old female with       multiple myeloma-kappa lambda free chain  Status post treatment with Revlimid and dexamethasone and then Velcade and dexamethasone  Patient has been on daratumumab and Velcade and dexamethasone since May 2017  Velcade given every 14 days  -So far patient had good response  Labs reviewed okay to proceed with the treatment  Patient has appointment in 2 weeks for Velcade and to see Dr. Roberts        Prophylaxis  Continue with acyclovir 400 mg p.o. twice daily      Chronic lower back pain  Well-controlled with oxycodone.  She takes occasionally oxycodone  Patient wants me to refill oxycodone today  Refill oxycodone  Call if worsening of back pain    Congestive heart failure  Continue follow-up with her PCP    KAROL Michaels  Patient is on Coumadin INR is checked by PCP  Patient denies bleeding      Patient is asked to cold he developed a fever chills sweats cough shortness of breath nausea vomiting diarrhea or abdominal pain or any changes in health condition        INTERIM HISTORY:  Overall besides mild weakness she has been tolerating treatment well.  She continues to have chronic back pain she denies saddle anesthesia patient is ambulatory.  She would like me to refill her oxycodone.  She takes oxycodone 1 tablet every other day.  Denies constipation  Patient denies fever chills sweats cough shortness of breath.  Chest pain.  Nausea vomiting diarrhea         MEDICATIONS:  Reviewed         PHYSICAL EXAM:  Vital signs:      BP: 115/73 Pulse: 65   Resp: 20 SpO2: 100 %     Height: 165.1 cm (5' 5\") Weight: 59.8 kg (131 lb 13.4 oz)  Estimated body mass index is 21.94 kg/m  as calculated from the following:    Height as of this encounter: 1.651 m (5' 5\").    Weight as of this encounter: 59.8 kg (131 lb 13.4 oz).      GENERAL/CONSTITUTIONAL: No acute " distress.  RESPIRATORY: Clear to auscultation bilaterally. No crackles or wheezing.   CARDIOVASCULAR: Regular rate and rhythm without murmurs, gallops, or rubs.  GASTROINTESTINAL: No hepatosplenomegaly, masses, or tenderness. The patient has normal bowel sounds. No guarding.  No distention.  MUSCULOSKELETAL: Warm and well-perfused, no cyanosis, clubbing, or edema.  LYMPH: No anterior cervical, posterior cervical, supraclavicular, axillary or inguinal adenopathy.       LABS:  Results for ALBERTO PARSON (MRN [id number]) as of 4/24/2019 14:07   Ref. Range 4/24/2019 11:48   Albumin Latest Ref Range: 3.4 - 5.0 g/dL 3.3 (L)   Protein Total Latest Ref Range: 6.8 - 8.8 g/dL 5.9 (L)   Bilirubin Total Latest Ref Range: 0.2 - 1.3 mg/dL 0.6   Alkaline Phosphatase Latest Ref Range: 40 - 150 U/L 53   ALT Latest Ref Range: 0 - 50 U/L 23   AST Latest Ref Range: 0 - 45 U/L 19   Bilirubin Direct Latest Ref Range: 0.0 - 0.2 mg/dL 0.2   WBC Latest Ref Range: 4.0 - 11.0 10e9/L 5.9   Hemoglobin Latest Ref Range: 11.7 - 15.7 g/dL 11.6 (L)   Hematocrit Latest Ref Range: 35.0 - 47.0 % 35.6   Platelet Count Latest Ref Range: 150 - 450 10e9/L 176   RBC Count Latest Ref Range: 3.8 - 5.2 10e12/L 3.72 (L)   MCV Latest Ref Range: 78 - 100 fl 96   MCH Latest Ref Range: 26.5 - 33.0 pg 31.2   MCHC Latest Ref Range: 31.5 - 36.5 g/dL 32.6   RDW Latest Ref Range: 10.0 - 15.0 % 15.5 (H)   Diff Method Unknown Automated Method   % Neutrophils Latest Units: % 64.8   % Lymphocytes Latest Units: % 19.5   % Monocytes Latest Units: % 14.1   % Eosinophils Latest Units: % 1.2   % Basophils Latest Units: % 0.2   % Immature Granulocytes Latest Units: % 0.2   Nucleated RBCs Latest Ref Range: 0 /100 0   Absolute Neutrophil Latest Ref Range: 1.6 - 8.3 10e9/L 3.9   Absolute Lymphocytes Latest Ref Range: 0.8 - 5.3 10e9/L 1.2   Absolute Monocytes Latest Ref Range: 0.0 - 1.3 10e9/L 0.8   Absolute Eosinophils Latest Ref Range: 0.0 - 0.7 10e9/L 0.1   Absolute Basophils  Latest Ref Range: 0.0 - 0.2 10e9/L 0.0   Abs Immature Granulocytes Latest Ref Range: 0 - 0.4 10e9/L 0.0   Absolute Nucleated RBC Unknown 0.0           Todd Loay Nurse Practitioner   Hematology/Oncology  [facility]    Chart documentation with Dragon Voice recognition Software. Although reviewed after completion, some words and grammatical errors may remain.

## 2019-05-22 NOTE — PROGRESS NOTES
Infusion Nursing Note:  Amira Arreola presents today for C26D1 velcade/darzalex  Patient seen by provider today: Yes: GEETHA Brooks   present during visit today: Not Applicable.    Note: N/A.    Intravenous Access:  Labs drawn without difficulty.  Implanted Port.    Treatment Conditions:  Lab Results   Component Value Date    HGB 12.2 05/22/2019     Lab Results   Component Value Date    WBC 17.1 05/22/2019      Lab Results   Component Value Date    ANEU 15.3 05/22/2019     Lab Results   Component Value Date     05/22/2019      Lab Results   Component Value Date     05/15/2019                   Lab Results   Component Value Date    POTASSIUM 4.3 05/15/2019           Lab Results   Component Value Date    MAG 2.0 03/22/2019            Lab Results   Component Value Date    CR 1.04 05/15/2019                   Lab Results   Component Value Date    BRADLEY 9.7 05/15/2019                Lab Results   Component Value Date    BILITOTAL 0.4 05/22/2019           Lab Results   Component Value Date    ALBUMIN 3.2 05/22/2019                    Lab Results   Component Value Date    ALT 34 05/22/2019           Lab Results   Component Value Date    AST 15 05/22/2019       Results reviewed, labs MET treatment parameters, ok to proceed with treatment.      Post Infusion Assessment:  Patient tolerated infusion without incident.  Blood return noted pre and post infusion.  Site patent and intact, free from redness, edema or discomfort.  No evidence of extravasations.  Access discontinued per protocol.       Discharge Plan:   Discharge instructions reviewed with: Patient.  Patient and/or family verbalized understanding of discharge instructions and all questions answered.  Copy of AVS reviewed with patient and/or family.  Patient will return as scheduled for next appointment.  Patient discharged in stable condition accompanied by: self.  Departure Mode: Ambulatory.    SHELLIE Minor  RN

## 2019-05-22 NOTE — LETTER
"    5/22/2019         RE: Amira Arreola  [address]  [facility] [address]        Dear Colleague,    Thank you for referring your patient, Amira Arreola, to the [facility]. Please see a copy of my visit note below.    Oncology Rooming Note    May 22, 2019 1:38 PM   Amira Arreola is a 86 year old female who presents for:    Chief Complaint   Patient presents with     Oncology Clinic Visit     Initial Vitals: /73   Pulse 65   Resp 20   Ht 1.651 m (5' 5\")   Wt 59.8 kg (131 lb 13.4 oz)   SpO2 100%   BMI 21.94 kg/m    Estimated body mass index is 21.94 kg/m  as calculated from the following:    Height as of this encounter: 1.651 m (5' 5\").    Weight as of this encounter: 59.8 kg (131 lb 13.4 oz). Body surface area is 1.66 meters squared.  Data Unavailable Comment: Data Unavailable   No LMP recorded. Patient is postmenopausal.  Allergies reviewed: Yes  Medications reviewed: Yes    Medications: MEDICATION REFILLS NEEDED TODAY. Provider was notified.  Pharmacy [facility] into [employer]: [facility] [address] [place] [address] AT [facility] [place] [address]    Clinical concerns: NP was notified.    SHIVA Pritchard, MEDINA              Hematology-Oncology Follow-up Note  [facility]  Today's Date: 05/22/2019        ASSESSMENT/ PLAN :  Amira Arreola is a 86 year old female with       multiple myeloma-kappa lambda free chain  Status post treatment with Revlimid and dexamethasone and then Velcade and dexamethasone  Patient has been on daratumumab and Velcade and dexamethasone since May 2017  Velcade given every 14 days  -So far patient had good response  Labs reviewed okay to proceed with the treatment  Patient has appointment in 2 weeks for Velcade and to see Dr. Roberts        Prophylaxis  Continue with acyclovir 400 mg p.o. twice daily      Chronic lower back pain  Well-controlled with oxycodone.  She takes occasionally " "oxycodone  Patient wants me to refill oxycodone today  Refill oxycodone  Call if worsening of back pain    Congestive heart failure  Continue follow-up with her PCP    KAROL Michaels  Patient is on Coumadin INR is checked by PCP  Patient denies bleeding      Patient is asked to cold he developed a fever chills sweats cough shortness of breath nausea vomiting diarrhea or abdominal pain or any changes in health condition        INTERIM HISTORY:  Overall besides mild weakness she has been tolerating treatment well.  She continues to have chronic back pain she denies saddle anesthesia patient is ambulatory.  She would like me to refill her oxycodone.  She takes oxycodone 1 tablet every other day.  Denies constipation  Patient denies fever chills sweats cough shortness of breath.  Chest pain.  Nausea vomiting diarrhea         MEDICATIONS:  Reviewed         PHYSICAL EXAM:  Vital signs:      BP: 115/73 Pulse: 65   Resp: 20 SpO2: 100 %     Height: 165.1 cm (5' 5\") Weight: 59.8 kg (131 lb 13.4 oz)  Estimated body mass index is 21.94 kg/m  as calculated from the following:    Height as of this encounter: 1.651 m (5' 5\").    Weight as of this encounter: 59.8 kg (131 lb 13.4 oz).      GENERAL/CONSTITUTIONAL: No acute distress.  RESPIRATORY: Clear to auscultation bilaterally. No crackles or wheezing.   CARDIOVASCULAR: Regular rate and rhythm without murmurs, gallops, or rubs.  GASTROINTESTINAL: No hepatosplenomegaly, masses, or tenderness. The patient has normal bowel sounds. No guarding.  No distention.  MUSCULOSKELETAL: Warm and well-perfused, no cyanosis, clubbing, or edema.  LYMPH: No anterior cervical, posterior cervical, supraclavicular, axillary or inguinal adenopathy.       LABS:  Results for ALBERTO PARSON (MRN [id number]) as of 4/24/2019 14:07   Ref. Range 4/24/2019 11:48   Albumin Latest Ref Range: 3.4 - 5.0 g/dL 3.3 (L)   Protein Total Latest Ref Range: 6.8 - 8.8 g/dL 5.9 (L)   Bilirubin Total Latest Ref Range: 0.2 - 1.3 " mg/dL 0.6   Alkaline Phosphatase Latest Ref Range: 40 - 150 U/L 53   ALT Latest Ref Range: 0 - 50 U/L 23   AST Latest Ref Range: 0 - 45 U/L 19   Bilirubin Direct Latest Ref Range: 0.0 - 0.2 mg/dL 0.2   WBC Latest Ref Range: 4.0 - 11.0 10e9/L 5.9   Hemoglobin Latest Ref Range: 11.7 - 15.7 g/dL 11.6 (L)   Hematocrit Latest Ref Range: 35.0 - 47.0 % 35.6   Platelet Count Latest Ref Range: 150 - 450 10e9/L 176   RBC Count Latest Ref Range: 3.8 - 5.2 10e12/L 3.72 (L)   MCV Latest Ref Range: 78 - 100 fl 96   MCH Latest Ref Range: 26.5 - 33.0 pg 31.2   MCHC Latest Ref Range: 31.5 - 36.5 g/dL 32.6   RDW Latest Ref Range: 10.0 - 15.0 % 15.5 (H)   Diff Method Unknown Automated Method   % Neutrophils Latest Units: % 64.8   % Lymphocytes Latest Units: % 19.5   % Monocytes Latest Units: % 14.1   % Eosinophils Latest Units: % 1.2   % Basophils Latest Units: % 0.2   % Immature Granulocytes Latest Units: % 0.2   Nucleated RBCs Latest Ref Range: 0 /100 0   Absolute Neutrophil Latest Ref Range: 1.6 - 8.3 10e9/L 3.9   Absolute Lymphocytes Latest Ref Range: 0.8 - 5.3 10e9/L 1.2   Absolute Monocytes Latest Ref Range: 0.0 - 1.3 10e9/L 0.8   Absolute Eosinophils Latest Ref Range: 0.0 - 0.7 10e9/L 0.1   Absolute Basophils Latest Ref Range: 0.0 - 0.2 10e9/L 0.0   Abs Immature Granulocytes Latest Ref Range: 0 - 0.4 10e9/L 0.0   Absolute Nucleated RBC Unknown 0.0           Todd Loya Nurse Practitioner   Hematology/Oncology  [facility]    Chart documentation with Dragon Voice recognition Software. Although reviewed after completion, some words and grammatical errors may remain.         Again, thank you for allowing me to participate in the care of your patient.        Sincerely,        DAYSI Villafana CNP

## 2019-05-22 NOTE — PROGRESS NOTES
"Oncology Rooming Note    May 22, 2019 1:38 PM   Amira Arreola is a 86 year old female who presents for:    Chief Complaint   Patient presents with     Oncology Clinic Visit     Initial Vitals: /73   Pulse 65   Resp 20   Ht 1.651 m (5' 5\")   Wt 59.8 kg (131 lb 13.4 oz)   SpO2 100%   BMI 21.94 kg/m   Estimated body mass index is 21.94 kg/m  as calculated from the following:    Height as of this encounter: 1.651 m (5' 5\").    Weight as of this encounter: 59.8 kg (131 lb 13.4 oz). Body surface area is 1.66 meters squared.  Data Unavailable Comment: Data Unavailable   No LMP recorded. Patient is postmenopausal.  Allergies reviewed: Yes  Medications reviewed: Yes    Medications: MEDICATION REFILLS NEEDED TODAY. Provider was notified.  Pharmacy name entered into [employer]: [facility] [address] [place] [address] AT [facility] [address]    Clinical concerns: NP was notified.    SIMBRINZA REFILL  Libertad Pritchard CMA            "

## 2019-05-23 ENCOUNTER — ANTICOAGULATION THERAPY VISIT (OUTPATIENT)
Dept: FAMILY MEDICINE | Facility: CLINIC | Age: 84
End: 2019-05-23
Payer: MEDICARE

## 2019-05-23 DIAGNOSIS — Z79.01 LONG TERM CURRENT USE OF ANTICOAGULANT THERAPY: ICD-10-CM

## 2019-05-23 LAB
ALBUMIN SERPL ELPH-MCNC: 3.7 G/DL (ref 3.7–5.1)
ALPHA1 GLOB SERPL ELPH-MCNC: 0.3 G/DL (ref 0.2–0.4)
ALPHA2 GLOB SERPL ELPH-MCNC: 0.8 G/DL (ref 0.5–0.9)
B-GLOBULIN SERPL ELPH-MCNC: 0.7 G/DL (ref 0.6–1)
GAMMA GLOB SERPL ELPH-MCNC: 0.2 G/DL (ref 0.7–1.6)
M PROTEIN SERPL ELPH-MCNC: 0 G/DL
PROT PATTERN SERPL ELPH-IMP: ABNORMAL

## 2019-05-23 PROCEDURE — 99207 ZZC NO CHARGE NURSE ONLY: CPT | Performed by: INTERNAL MEDICINE

## 2019-05-23 NOTE — PROGRESS NOTES
ANTICOAGULATION FOLLOW-UP CLINIC VISIT    Patient Name:  Amira Arreola  Date:  5/23/2019  Contact Type:  Telephone/ called pt    SUBJECTIVE:         OBJECTIVE    INR   Date Value Ref Range Status   05/22/2019 3.12 (H) 0.86 - 1.14 Final       ASSESSMENT / PLAN  INR assessment SUPRA    Recheck INR In: 2 WEEKS    INR Location Outside lab      Anticoagulation Summary  As of 5/23/2019    INR goal:   2.0-3.0   TTR:   67.0 % (2.9 y)   INR used for dosing:   3.12! (5/22/2019)   Warfarin maintenance plan:   4 mg (4 mg x 1) every Tue, Sat; 2 mg (4 mg x 0.5) all other days   Full warfarin instructions:   4 mg every Tue, Sat; 2 mg all other days   Weekly warfarin total:   18 mg   No change documented:   Bri Mcbride RN   Plan last modified:   Jessica Moody RN (3/13/2019)   Next INR check:   6/5/2019   Target end date:   Indefinite    Indications    Long term current use of anticoagulant therapy [Z79.01]  Atrial fibrillation (H) [I48.91] (Resolved) [I48.91]             Anticoagulation Episode Summary     INR check location:       Preferred lab:       Send INR reminders to:    ANTICOAGULATION    Comments:    INR to be drawn at infusion visit OR home care nurse. If scheduled for Homecare, Please notify  Zhane [phone] Patient can be reached at home phone [phone]. Do not leave dosing with spouse      Anticoagulation Care Providers     Provider Role Specialty Phone number    Addy Frias MD [facility] Internal Medicine [phone]            See the Encounter Report to view Anticoagulation Flowsheet and Dosing Calendar (Go to Encounters tab in chart review, and find the Anticoagulation Therapy Visit)    Dosage adjustment made based on physician directed care plan.    Called patient with Warfarin dosing and next INR date.  See flowsheet.         Bri Mcbride RN

## 2019-05-25 LAB
RESULT: ABNORMAL
SEND OUTS MISC TEST CODE: ABNORMAL
SEND OUTS MISC TEST SPECIMEN: ABNORMAL
TEST NAME: ABNORMAL

## 2019-06-04 ENCOUNTER — TELEPHONE (OUTPATIENT)
Dept: FAMILY MEDICINE | Facility: CLINIC | Age: 84
End: 2019-06-04

## 2019-06-04 NOTE — TELEPHONE ENCOUNTER
GUABLERTO Barone that as far as we know, since Dr. Frias was in the office on 5/31/19, he did sign the order.  I asked that if we need to confirm it, she fax me the signature page so we can do so.      Lee Ann Adam MA

## 2019-06-04 NOTE — TELEPHONE ENCOUNTER
Dr. Frias,     I assume that since you were here on 5/31, you signed the home care orders but do you recall doing it?    Lee Ann Adam MA

## 2019-06-04 NOTE — TELEPHONE ENCOUNTER
HOME CARE called/ needs to verify who signed cert 3/31/19- 5/29/19  Signed 5/31/19    Need to know who and why they signed for Dr Frias    [phone]  CONNER ROBERT TO LEAVE VOICE MAIL

## 2019-06-05 ENCOUNTER — INFUSION THERAPY VISIT (OUTPATIENT)
Dept: INFUSION THERAPY | Facility: CLINIC | Age: 84
End: 2019-06-05
Attending: INTERNAL MEDICINE
Payer: MEDICARE

## 2019-06-05 ENCOUNTER — HOSPITAL ENCOUNTER (OUTPATIENT)
Facility: CLINIC | Age: 84
Setting detail: SPECIMEN
Discharge: HOME | End: 2019-06-05
Attending: INTERNAL MEDICINE | Admitting: INTERNAL MEDICINE
Payer: MEDICARE

## 2019-06-05 ENCOUNTER — ONCOLOGY VISIT (OUTPATIENT)
Dept: ONCOLOGY | Facility: CLINIC | Age: 84
End: 2019-06-05
Attending: INTERNAL MEDICINE
Payer: MEDICARE

## 2019-06-05 ENCOUNTER — ANTICOAGULATION THERAPY VISIT (OUTPATIENT)
Dept: FAMILY MEDICINE | Facility: CLINIC | Age: 84
End: 2019-06-05
Payer: MEDICARE

## 2019-06-05 VITALS
TEMPERATURE: 98 F | OXYGEN SATURATION: 94 % | RESPIRATION RATE: 16 BRPM | HEIGHT: 65 IN | WEIGHT: 133 LBS | HEART RATE: 70 BPM | DIASTOLIC BLOOD PRESSURE: 59 MMHG | BODY MASS INDEX: 22.16 KG/M2 | SYSTOLIC BLOOD PRESSURE: 98 MMHG

## 2019-06-05 VITALS
WEIGHT: 132.94 LBS | BODY MASS INDEX: 22.15 KG/M2 | SYSTOLIC BLOOD PRESSURE: 98 MMHG | DIASTOLIC BLOOD PRESSURE: 59 MMHG | HEIGHT: 65 IN | RESPIRATION RATE: 16 BRPM | TEMPERATURE: 98 F | OXYGEN SATURATION: 94 % | HEART RATE: 70 BPM

## 2019-06-05 DIAGNOSIS — Z79.01 LONG TERM CURRENT USE OF ANTICOAGULANT THERAPY: ICD-10-CM

## 2019-06-05 DIAGNOSIS — I50.9 ACUTE ON CHRONIC CONGESTIVE HEART FAILURE, UNSPECIFIED HEART FAILURE TYPE (H): ICD-10-CM

## 2019-06-05 DIAGNOSIS — Z95.828 PORT-A-CATH IN PLACE: ICD-10-CM

## 2019-06-05 DIAGNOSIS — C90.00 MULTIPLE MYELOMA NOT HAVING ACHIEVED REMISSION (H): Primary | ICD-10-CM

## 2019-06-05 DIAGNOSIS — I48.0 PAROXYSMAL ATRIAL FIBRILLATION (H): ICD-10-CM

## 2019-06-05 LAB
BASOPHILS # BLD AUTO: 0 10E9/L (ref 0–0.2)
BASOPHILS NFR BLD AUTO: 0.1 %
DIFFERENTIAL METHOD BLD: ABNORMAL
EOSINOPHIL # BLD AUTO: 0.1 10E9/L (ref 0–0.7)
EOSINOPHIL NFR BLD AUTO: 0.8 %
ERYTHROCYTE [DISTWIDTH] IN BLOOD BY AUTOMATED COUNT: 18.2 % (ref 10–15)
HCT VFR BLD AUTO: 34.1 % (ref 35–47)
HGB BLD-MCNC: 11.2 G/DL (ref 11.7–15.7)
IMM GRANULOCYTES # BLD: 0.1 10E9/L (ref 0–0.4)
IMM GRANULOCYTES NFR BLD: 0.5 %
INR PPP: 2.08 (ref 0.86–1.14)
LYMPHOCYTES # BLD AUTO: 0.4 10E9/L (ref 0.8–5.3)
LYMPHOCYTES NFR BLD AUTO: 4.2 %
MCH RBC QN AUTO: 30.9 PG (ref 26.5–33)
MCHC RBC AUTO-ENTMCNC: 32.8 G/DL (ref 31.5–36.5)
MCV RBC AUTO: 94 FL (ref 78–100)
MONOCYTES # BLD AUTO: 0.7 10E9/L (ref 0–1.3)
MONOCYTES NFR BLD AUTO: 6.3 %
NEUTROPHILS # BLD AUTO: 9.2 10E9/L (ref 1.6–8.3)
NEUTROPHILS NFR BLD AUTO: 88.1 %
NRBC # BLD AUTO: 0 10*3/UL
NRBC BLD AUTO-RTO: 0 /100
PLATELET # BLD AUTO: 170 10E9/L (ref 150–450)
RBC # BLD AUTO: 3.62 10E12/L (ref 3.8–5.2)
WBC # BLD AUTO: 10.4 10E9/L (ref 4–11)

## 2019-06-05 PROCEDURE — 99214 OFFICE O/P EST MOD 30 MIN: CPT | Performed by: INTERNAL MEDICINE

## 2019-06-05 PROCEDURE — 25000128 H RX IP 250 OP 636: Performed by: INTERNAL MEDICINE

## 2019-06-05 PROCEDURE — 85025 COMPLETE CBC W/AUTO DIFF WBC: CPT | Performed by: INTERNAL MEDICINE

## 2019-06-05 PROCEDURE — 99207 ZZC NO CHARGE NURSE ONLY: CPT | Performed by: INTERNAL MEDICINE

## 2019-06-05 PROCEDURE — 85610 PROTHROMBIN TIME: CPT | Performed by: INTERNAL MEDICINE

## 2019-06-05 PROCEDURE — 96401 CHEMO ANTI-NEOPL SQ/IM: CPT

## 2019-06-05 RX ORDER — DEXAMETHASONE 4 MG/1
TABLET ORAL
Qty: 28 TABLET | Refills: 3 | Status: SHIPPED | OUTPATIENT
Start: 2019-06-05 | End: 2019-10-09

## 2019-06-05 RX ORDER — HEPARIN SODIUM (PORCINE) LOCK FLUSH IV SOLN 100 UNIT/ML 100 UNIT/ML
500 SOLUTION INTRAVENOUS EVERY 8 HOURS
Status: DISCONTINUED | OUTPATIENT
Start: 2019-06-05 | End: 2019-06-05 | Stop reason: HOSPADM

## 2019-06-05 RX ORDER — ACYCLOVIR 400 MG/1
400 TABLET ORAL
Qty: 60 TABLET | Refills: 3 | Status: SHIPPED | OUTPATIENT
Start: 2019-06-05 | End: 2019-10-09

## 2019-06-05 RX ORDER — HEPARIN SODIUM (PORCINE) LOCK FLUSH IV SOLN 100 UNIT/ML 100 UNIT/ML
500 SOLUTION INTRAVENOUS EVERY 8 HOURS
Status: CANCELLED
Start: 2019-06-05

## 2019-06-05 RX ADMIN — BORTEZOMIB 2.1 MG: 3.5 INJECTION, POWDER, LYOPHILIZED, FOR SOLUTION INTRAVENOUS; SUBCUTANEOUS at 11:09

## 2019-06-05 RX ADMIN — HEPARIN SODIUM (PORCINE) LOCK FLUSH IV SOLN 100 UNIT/ML 500 UNITS: 100 SOLUTION at 11:09

## 2019-06-05 ASSESSMENT — PAIN SCALES - GENERAL
PAINLEVEL: NO PAIN (0)
PAINLEVEL: NO PAIN (0)

## 2019-06-05 ASSESSMENT — MIFFLIN-ST. JEOR
SCORE: 1044.16
SCORE: 1043.88

## 2019-06-05 NOTE — PROGRESS NOTES
Infusion Nursing Note:  Amira IVY Arreola presents today for C26D15 Velcade.    Patient seen by provider today: Yes: Dr. Roberts   present during visit today: Not Applicable.    Note: Pt reports feeling tired today and arrived via wheelchair.    Intravenous Access:  Labs drawn without difficulty.  Implanted Port.    Treatment Conditions:  Lab Results   Component Value Date    HGB 11.2 06/05/2019     Lab Results   Component Value Date    WBC 10.4 06/05/2019      Lab Results   Component Value Date    ANEU 9.2 06/05/2019     Lab Results   Component Value Date     06/05/2019      Results reviewed, labs MET treatment parameters, ok to proceed with treatment.      Post Infusion Assessment:  Patient tolerated injection without incident.  Site patent and intact, free from redness, edema or discomfort.  Access discontinued per protocol.       Discharge Plan:   Patient declined prescription refills.  Discharge instructions reviewed with: Patient and Family.  Patient and/or family verbalized understanding of discharge instructions and all questions answered.  Copy of AVS reviewed with patient and/or family.  Patient will return mid-August for next appointment.  Patient discharged in stable condition accompanied by: self and daughter.  Departure Mode: Wheelchair.    Fanny Bob RN

## 2019-06-05 NOTE — PROGRESS NOTES
ANTICOAGULATION FOLLOW-UP CLINIC VISIT    Patient Name:  Amira Arreola  Date:  2019  Contact Type:  Telephone/ Left VM for Amira, spoke with Tom    SUBJECTIVE:  Patient Findings     Comments:   Unable to assess for S/S bleeding or clotting, health changes.  Left detailed voice message requesting call back with any symptoms, upcoming procedures, warfarin refill needs, or other concerns we should know about or address.  Advised continue current warfarin dosing and return for INR recheck in 2 weeks.          Clinical Outcomes     Comments:   Unable to assess for S/S bleeding or clotting, health changes.  Left detailed voice message requesting call back with any symptoms, upcoming procedures, warfarin refill needs, or other concerns we should know about or address.  Advised continue current warfarin dosing and return for INR recheck in 2 weeks.             OBJECTIVE    INR   Date Value Ref Range Status   2019 2.08 (H) 0.86 - 1.14 Final       ASSESSMENT / PLAN  INR assessment THER    Recheck INR In: 2 WEEKS    INR Location Clinic      Anticoagulation Summary  As of 2019    INR goal:   2.0-3.0   TTR:   67.3 % (2.9 y)   INR used for dosin.08 (2019)   Warfarin maintenance plan:   4 mg (4 mg x 1) every Tue, Sat; 2 mg (4 mg x 0.5) all other days   Full warfarin instructions:   4 mg every Tue, Sat; 2 mg all other days   Weekly warfarin total:   18 mg   Plan last modified:   Jessica Moody RN (3/13/2019)   Next INR check:   2019   Target end date:   Indefinite    Indications    Long term current use of anticoagulant therapy [Z79.01]  Atrial fibrillation (H) [I48.91] (Resolved) [I48.91]             Anticoagulation Episode Summary     INR check location:       Preferred lab:       Send INR reminders to:    ANTICOAGULATION    Comments:    INR to be drawn at infusion visit OR home care nurse. If scheduled for Homecare, Please notify  Zhane [phone] Patient can be reached at home phone  [phone]. Do not leave dosing with spouse      Anticoagulation Care Providers     Provider Role Specialty Phone number    Addy Frias MD [facility] Internal Medicine [phone]            See the Encounter Report to view Anticoagulation Flowsheet and Dosing Calendar (Go to Encounters tab in chart review, and find the Anticoagulation Therapy Visit)    Tom stated he will let Amira know to call and schedule an INR appointment, and left detailed VM on Amira's phone to continue same dosing and RTC for lab in 2 weeks for appointment.    Shantel Leon [facility]                   SUBJECTIVE:   Amira Arreola is a 86 year old female who presents to clinic today for the following   health issues:  {Provider please address medication reconciliation discrepancies--rooming staff   please delete if no med/rec issues}            Additional history:     Reviewed  and updated as needed this visit by clinical staff         Reviewed and updated as needed this visit by Provider

## 2019-06-05 NOTE — PATIENT INSTRUCTIONS
Velcade today. Hold chemotherapy after today till mid-august 2019.  Continue weekly dexamethasone.  Continue acyclovir.  Follow up in mid-august with labs.    Patient back in [facility]

## 2019-06-05 NOTE — Clinical Note
"    6/5/2019         RE: Amira Arreola  [address]  [place] [address]        Dear Colleague,    Thank you for referring your patient, Amira Arreola, to the [facility] CANCER [facility]. Please see a copy of my visit note below.    Oncology Rooming Note    June 5, 2019 9:48 AM   Amira Arreola is a 86 year old female who presents for:    Chief Complaint   Patient presents with     Oncology Clinic Visit     Initial Vitals: BP 98/59   Pulse 70   Temp 98  F (36.7  C) (Oral)   Resp 16   Ht 1.651 m (5' 5\")   Wt 60.3 kg (132 lb 15 oz)   SpO2 94%   BMI 22.12 kg/m    Estimated body mass index is 22.12 kg/m  as calculated from the following:    Height as of this encounter: 1.651 m (5' 5\").    Weight as of this encounter: 60.3 kg (132 lb 15 oz). Body surface area is 1.66 meters squared.  No Pain (0) Comment: Data Unavailable   No LMP recorded. Patient is postmenopausal.  Allergies reviewed: Yes  Medications reviewed: Yes    Medications: Medication refills not needed today.  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     1. On 09/21/2015, WBC of 4.2, hemoglobin of 13.2 and platelets of 138.    -On 09/29/2015, SPEP does not reveal any M-spike.   -On 10/02/2015, JANET does not reveal any monoclonal protein.     -On 10/22/2015, urine immunofixation reveals monoclonal free kappa light chain.    2. On 05/11/2016, kappa light chain of 50, lambda light chain of 0.32 and ratio of kappa to lambda of 156.2.  3. Bone marrow biopsy on 05/25/2016 reveals 40-50% kappa light chain restricted plasma cells.  Cytogenetics is normal. FISH panel reveals translocation 11;14.    4. MRI of bones on 06/21/2016 and 06/22/2016 " reveals myeloma lesions.  5. On 08/24/2016, she was started on revlimid with dexamethasone 20 mg weekly. She did not have any significant response to treatment.   6. Velcade and dexamethasone started on 03/21/2017.    7. Daratumumab added to velcade and dexamethasone on 05/31/2017.   -Velcade given every 14 days starting 08/01/2018.  8. On 05/22/2019, kappa free light chain of 1.21.     SUBJECTIVE:  Ms. Arreola is an 86-year-old female with kappa free light chain multiple myeloma on treatment with Velcade and daratumumab.  She has been overall tolerating it well.  For convenience, she has been getting Velcade every other week.  She also gets weekly dexamethasone and is on prophylactic acyclovir.      Lately she has been getting weaker.  She has worsening fatigue.  Because of that, ambulation is limited.  She does have chronic back pain.  She takes oxycodone, which helps with the pain.      No headache.  Some lightheadedness.  No recent fall.  No chest pain.  No shortness of breath.  No nausea or vomiting.  Appetite has been fairly good.  No urinary or bowel complaints.  No bleeding.      The patient was brought by her daughter.  As per the daughter, the patient's weakness has been progressively getting worse.  That is the major problem.      PHYSICAL EXAMINATION:   GENERAL:  She is alert, oriented x 3.   VITAL SIGNS: Reviewed. ECOG PS of 2.   The rest of the systems not examined.      LABORATORY DATA:  Reviewed.   -On 05/22/2019, kappa free light chain of 1.21.      ASSESSMENT:   1.  An 86-year-old female with kappa free light chain multiple myeloma. Disease responding well to daratumumab, Velcade and dexamethasone.   2.  Chronic back pain, stable.   3.  Worsening weakness/fatigue is due to her age, myeloma and chemotherapy.      PLAN:   1.  I discussed regarding myeloma.  The patient has been doing well from it.  [place] free light chain is normal.  It used to be high at around 60.      She is on Velcade, daratumumab,  and dexamethasone.  The patient has worsening weakness.  I explained to the patient that some of the weakness could be from chemotherapy.      We discussed regarding giving a break from treatment.  She is agreeable for it.  We will give her a break until mid August and see how she does.      Today, she will get subcutaneous Velcade.  After that, we will hold Velcade and daratumumab.  She will continue on weekly dexamethasone.  Hopefully, her weakness/fatigue will improve.        2.  She will continue on prophylactic acyclovir.      3.  She will continue on oxycodone for pain.  The patient advised to call us for refill.      4.  I will see her in mid August with labs.  Daughter advised to bring the patient back sooner if she has worsening weakness, chest pain, shortness of breath, recurrent vomiting, bleeding or any other concerns.      TOTAL FACE-TO-FACE TIME SPENT:  25 minutes, most of the time spent in counseling and coordination of care.         ITZ LOPEZ MD             D: 2019   T: 2019   MT:       Name:     ALBERTO PARSON   MRN:      [id number]        Account:      [id number]   :      1932           Visit Date:   2019      Document: [id number]        Again, thank you for allowing me to participate in the care of your patient.        Sincerely,        Itz Lopez MD

## 2019-06-05 NOTE — PROGRESS NOTES
"Oncology Rooming Note    June 5, 2019 9:48 AM   Amira Arreola is a 86 year old female who presents for:    Chief Complaint   Patient presents with     Oncology Clinic Visit     Initial Vitals: BP 98/59   Pulse 70   Temp 98  F (36.7  C) (Oral)   Resp 16   Ht 1.651 m (5' 5\")   Wt 60.3 kg (132 lb 15 oz)   SpO2 94%   BMI 22.12 kg/m   Estimated body mass index is 22.12 kg/m  as calculated from the following:    Height as of this encounter: 1.651 m (5' 5\").    Weight as of this encounter: 60.3 kg (132 lb 15 oz). Body surface area is 1.66 meters squared.  No Pain (0) Comment: Data Unavailable   No LMP recorded. Patient is postmenopausal.  Allergies reviewed: Yes  Medications reviewed: Yes    Medications: Medication refills not needed today.  Pharmacy name entered into [facility]: [facility] [address] [place] [address] [address] AT [facility] [address]    Clinical concerns: refill  of acyclovir, very fatigued      Camila Mishra, MEDINA            "

## 2019-06-05 NOTE — PROGRESS NOTES
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     1. On 09/21/2015, WBC of 4.2, hemoglobin of 13.2 and platelets of 138.    -On 09/29/2015, SPEP does not reveal any M-spike.   -On 10/02/2015, JANET does not reveal any monoclonal protein.     -On 10/22/2015, urine immunofixation reveals monoclonal free kappa light chain.    2. On 05/11/2016, kappa light chain of 50, lambda light chain of 0.32 and ratio of kappa to lambda of 156.2.  3. Bone marrow biopsy on 05/25/2016 reveals 40-50% kappa light chain restricted plasma cells.  Cytogenetics is normal. FISH panel reveals translocation 11;14.    4. MRI of bones on 06/21/2016 and 06/22/2016 reveals myeloma lesions.  5. On 08/24/2016, she was started on revlimid with dexamethasone 20 mg weekly. She did not have any significant response to treatment.   6. Velcade and dexamethasone started on 03/21/2017.    7. Daratumumab added to velcade and dexamethasone on 05/31/2017.   -Velcade given every 14 days starting 08/01/2018.  8. On 05/22/2019, kappa free light chain of 1.21.     SUBJECTIVE:  Ms. Arreola is an 86-year-old female with kappa free light chain multiple myeloma on treatment with Velcade and daratumumab.  She has been overall tolerating it well.  For convenience, she has been getting Velcade every other week.  She also gets weekly dexamethasone and is on prophylactic acyclovir.      Lately she has been getting weaker.  She has worsening fatigue.  Because of that, ambulation is limited.  She does have chronic back pain.  She takes oxycodone, which helps with the pain.      No headache.  Some lightheadedness.  No recent fall.  No chest pain.  No shortness of breath.  No nausea or vomiting.  Appetite has been fairly good.  No urinary or bowel complaints.  No bleeding.      The patient was brought by her daughter.  As per the daughter, the patient's weakness has been progressively getting  worse.  That is the major problem.      PHYSICAL EXAMINATION:   GENERAL:  She is alert, oriented x 3.   VITAL SIGNS: Reviewed. ECOG PS of 2.   The rest of the systems not examined.      LABORATORY DATA:  Reviewed.   -On 2019, kappa free light chain of 1.21.      ASSESSMENT:   1.  An 86-year-old female with kappa free light chain multiple myeloma. Disease responding well to daratumumab, Velcade and dexamethasone.   2.  Chronic back pain, stable.   3.  Worsening weakness/fatigue is due to her age, myeloma and chemotherapy.      PLAN:   1.  I discussed regarding myeloma.  The patient has been doing well from it.  [place] free light chain is normal.  It used to be high at around 60.      She is on Velcade, daratumumab, and dexamethasone.  The patient has worsening weakness.  I explained to the patient that some of the weakness could be from chemotherapy.      We discussed regarding giving a break from treatment.  She is agreeable for it.  We will give her a break until mid August and see how she does.      Today, she will get subcutaneous Velcade.  After that, we will hold Velcade and daratumumab.  She will continue on weekly dexamethasone.  Hopefully, her weakness/fatigue will improve.        2.  She will continue on prophylactic acyclovir.      3.  She will continue on oxycodone for pain.  The patient advised to call us for refill.      4.  I will see her in mid August with labs.  Daughter advised to bring the patient back sooner if she has worsening weakness, chest pain, shortness of breath, recurrent vomiting, bleeding or any other concerns.      TOTAL FACE-TO-FACE TIME SPENT:  25 minutes, most of the time spent in counseling and coordination of care.         ITZ LOPEZ MD             D: 2019   T: 2019   MT:       Name:     ALBERTO PARSON   MRN:      [id number]        Account:      [id number]   :      1932           Visit Date:   2019      Document: [id number]

## 2019-06-06 ENCOUNTER — CARE COORDINATION (OUTPATIENT)
Dept: CARDIOLOGY | Facility: CLINIC | Age: 84
End: 2019-06-06

## 2019-06-06 NOTE — PROGRESS NOTES
I called pt to get an update on her weight and symptoms. Pt said her weight has been staying between 130-132#. She has a small amount of LE edema, but denied any shortness of breath. She said she sleeps with 2-3 pillows, but that is what she has always done. Pt has 6/26 lab and OV with Elisabeth. Will send her an update on pt's symptoms. Pt requested that I call her daughter, Yesi, with an update on next appt and any medication changes that Elisabeth might want. Yohan HENSLEY June 6, 2019, 10:10 AM

## 2019-06-12 ENCOUNTER — TELEPHONE (OUTPATIENT)
Dept: FAMILY MEDICINE | Facility: CLINIC | Age: 84
End: 2019-06-12

## 2019-06-12 DIAGNOSIS — I48.0 PAROXYSMAL ATRIAL FIBRILLATION (H): ICD-10-CM

## 2019-06-12 DIAGNOSIS — Z79.01 LONG TERM CURRENT USE OF ANTICOAGULANT THERAPY: Primary | ICD-10-CM

## 2019-06-12 NOTE — TELEPHONE ENCOUNTER
,Reason for Call:  Other call back    Detailed comments: Patient daughter Emely is calling she would like patient INR orders transfer to [facility] in [place] [phone]    Phone Number Patient can be reached at: Other phone number: did not get daughter number or call patient.      Best Time: anytime     Can we leave a detailed message on this number? YES    Call taken on 6/12/2019 at 2:59 PM by Rachele Diggs

## 2019-06-25 ENCOUNTER — TELEPHONE (OUTPATIENT)
Dept: FAMILY MEDICINE | Facility: CLINIC | Age: 84
End: 2019-06-25
Payer: MEDICARE

## 2019-06-25 DIAGNOSIS — Z79.01 LONG TERM CURRENT USE OF ANTICOAGULANT THERAPY: ICD-10-CM

## 2019-06-25 LAB — INR PPP: 3.2 (ref 0.8–1.1)

## 2019-06-25 PROCEDURE — 99207 ZZC NO CHARGE NURSE ONLY: CPT | Performed by: INTERNAL MEDICINE

## 2019-06-25 NOTE — TELEPHONE ENCOUNTER
ANTICOAGULATION FOLLOW-UP CLINIC VISIT    Patient Name:  Amira Arreola  Date:  6/25/2019  Contact Type:  Telephone/ Spoke with Amira    SUBJECTIVE:  Patient Findings     Positives:   Change in diet/appetite (she has been eating less greens. She will add them back in to diet)        Clinical Outcomes     Negatives:   Major bleeding event, Thromboembolic event, Anticoagulation-related hospital admission, Anticoagulation-related ED visit, Anticoagulation-related fatality           OBJECTIVE    INR   Date Value Ref Range Status   06/25/2019 3.2 (A) 0.8 - 1.1 Final       ASSESSMENT / PLAN   ANTICOAGULATION  MANAGEMENT    Assessment     Today's INR result of 3.2 is Supratherapeutic with an INR goal range of 2.0-3.0      Additional findings -she has not been eating as many greens. She will add them back in to diet.    Plan:     spoke with  Amira regarding INR result and instructed:     Warfarin Dosing Instructions:   CONTINUE YOUR CURRENT DOSE      Instructed patient to follow up no later than: 2 weeks    Education provided: Yes  Discussed greens and effect on INR.    Amira verbalizes understanding and agrees to warfarin dosing plan.    Instructed to call the Anticoagulation Clinic for any changes, questions or concerns. (#[phone])      Anticoagulation Summary  As of 6/25/2019    INR goal:   2.0-3.0   TTR:   67.5 % (3 y)   INR used for dosing:   3.2! (6/25/2019)   Warfarin maintenance plan:   4 mg (4 mg x 1) every Tue, Sat; 2 mg (4 mg x 0.5) all other days   Full warfarin instructions:   4 mg every Tue, Sat; 2 mg all other days   Weekly warfarin total:   18 mg   No change documented:   Megan Guzman RN   Plan last modified:   Jessica Moody, RN (3/13/2019)   Next INR check:   7/9/2019   Target end date:   Indefinite    Indications    Long term current use of anticoagulant therapy [Z79.01]  Atrial fibrillation (H) [I48.91] (Resolved) [I48.91]             Anticoagulation Episode Summary     INR check  location:       Preferred lab:       Send INR reminders to:   DANTE MOYA    Comments:    INR to be drawn at infusion visit OR home care nurse. If scheduled for Homecare, Please notify  Zhane [phone] Patient can be reached at home phone [phone]. Do not leave dosing with spouse      Anticoagulation Care Providers     Provider Role Specialty Phone number    Addy Frias MD [facility] [phone]                Megan Guzman RN

## 2019-06-26 ENCOUNTER — OFFICE VISIT (OUTPATIENT)
Dept: CARDIOLOGY | Facility: CLINIC | Age: 84
End: 2019-06-26
Attending: NURSE PRACTITIONER
Payer: MEDICARE

## 2019-06-26 VITALS
SYSTOLIC BLOOD PRESSURE: 88 MMHG | WEIGHT: 134 LBS | OXYGEN SATURATION: 94 % | HEART RATE: 80 BPM | HEIGHT: 65 IN | DIASTOLIC BLOOD PRESSURE: 54 MMHG | BODY MASS INDEX: 22.33 KG/M2

## 2019-06-26 DIAGNOSIS — I95.89 OTHER SPECIFIED HYPOTENSION: ICD-10-CM

## 2019-06-26 DIAGNOSIS — I50.30 (HFPEF) HEART FAILURE WITH PRESERVED EJECTION FRACTION (H): ICD-10-CM

## 2019-06-26 DIAGNOSIS — I48.0 PAROXYSMAL ATRIAL FIBRILLATION (H): ICD-10-CM

## 2019-06-26 DIAGNOSIS — E78.5 HYPERLIPIDEMIA LDL GOAL <160: ICD-10-CM

## 2019-06-26 DIAGNOSIS — I50.32 CHRONIC HEART FAILURE WITH PRESERVED EJECTION FRACTION (H): Primary | ICD-10-CM

## 2019-06-26 LAB
ANION GAP SERPL CALCULATED.3IONS-SCNC: 10.2 MMOL/L (ref 6–17)
BUN SERPL-MCNC: 16 MG/DL (ref 7–30)
CALCIUM SERPL-MCNC: 9.5 MG/DL (ref 8.5–10.5)
CHLORIDE SERPL-SCNC: 105 MMOL/L (ref 98–107)
CO2 SERPL-SCNC: 23 MMOL/L (ref 23–29)
CREAT SERPL-MCNC: 0.82 MG/DL (ref 0.7–1.3)
GFR SERPL CREATININE-BSD FRML MDRD: 66 ML/MIN/{1.73_M2}
GLUCOSE SERPL-MCNC: 156 MG/DL (ref 70–105)
POTASSIUM SERPL-SCNC: 4.2 MMOL/L (ref 3.5–5.1)
SODIUM SERPL-SCNC: 134 MMOL/L (ref 136–145)

## 2019-06-26 PROCEDURE — 36415 COLL VENOUS BLD VENIPUNCTURE: CPT | Performed by: NURSE PRACTITIONER

## 2019-06-26 PROCEDURE — 80048 BASIC METABOLIC PNL TOTAL CA: CPT | Performed by: NURSE PRACTITIONER

## 2019-06-26 PROCEDURE — 99215 OFFICE O/P EST HI 40 MIN: CPT | Performed by: NURSE PRACTITIONER

## 2019-06-26 RX ORDER — DILTIAZEM HYDROCHLORIDE 120 MG/1
120 CAPSULE, EXTENDED RELEASE ORAL DAILY
Qty: 30 CAPSULE | Refills: 1 | Status: ON HOLD | OUTPATIENT
Start: 2019-06-26 | End: 2019-07-01

## 2019-06-26 ASSESSMENT — MIFFLIN-ST. JEOR: SCORE: 1048.7

## 2019-06-26 NOTE — PATIENT INSTRUCTIONS
Call CORE nurse for any questions or concerns Mon-Fri 8am-4pm:                                                [phone]                                       For concerns after hours:                                                 [phone]     Medication changes: DECREASE diltiazem to 120mg daily, RESTART furosemide 40mg daily.   Plan from today: Follow up with Elisabeth in 1 week, labs prior. Scheduled palliative care appointment.   Lab results: see attached; stable kidney function and electrolytes.

## 2019-06-26 NOTE — PROGRESS NOTES
Cardiology Clinic Progress Note  Amira Arreola MRN# [id number]   YOB: 1932 Age: 86 year old   Primary Cardiologist: Dr. Rivera Reason for visit: CORE follow up            Assessment and Plan:   Amira Arreola is a very pleasant 86 year old female with a history of HFpEF, chronic atrial fibrillation, hypertension, and hx of multiple myeloma mets to bone (dx 2016, currently being treated with Daratumab, Velcade, and Dexamethasone every 2 weeks). Hospitalized 3/22/19-3/28/19 presented with dyspnea, leg swelling and intermittent palpitations. Patient was found to have atrial fibrillation with RVR and acute diastolic heart failure exacerbation. Patient discharged home from TCU on 4/11/19. Patient here today for CORE follow up.    1.  Chronic diastolic heart failure/HFpEF - Echocardiogram completed 3/23/19 LVEF 55-60%, no wall motion abnormalities, moderate mitral regurgitation, moderate tricuspid regurgitation, and severe pulmonary hypertension. Weight today 134#, which is slightly up from prior visits 125-128#. Patient appears volume up on exam. Labs from today show stable kidney function, sodium 134 other electrolytes stable. Worsening volume status is likely secondary to her skipping multiple doses of her diuretic, due to frequent urination/incontinence and inability to get to the bathroom.      - NYHA class III, stage C   - Fluid status : euvolemic   - Diuretic regimen : furosemide to 40mg daily   - Aldosterone antagonist : no room in blood pressure   - Blood pressure : controlled   - Reinforced HF education, reviewed low sodium diet and reading labels today.    - Currently monitoring weight closely at home and supportive/involved children, reinforced the importance of calling for weight changes. If difficulties with managing weight/fluids will enroll in telehealth tracker.    - Educated patient on the importance of taking furosemide daily, explored options with patient and son to help with  incontinence/toileting    2. Chronic atrial fibrillation - stable, asymptomatic, rates controlled, during last clinic visit rates were elevated and diltiazem was increased. Now rates controlled but hypotensive with c/o of fatigue/weakness. Decreasing diltiazem today with close followup, will consider initiation of digoxin if rates become elevated.    - DECREASE diltiazem to 120mg daily   - Continue metoprolol XL 150mg BID   - [id number] score 5 (HTN, HF, age, female)   - Continue warfarin for thromboembolic prophylaxis.     3. Hypotension - decrease diltiazem today to 120mg, may need to further titrate medications down if BP remains elevated. Plan for close clinic follow up.     4. Moderate mitral regurgitation, moderate tricuspid regurgitation   - Will consider repeat echocardiogram in the future when patient is euvolemic or if worsening symptoms, currently won't change any management clinically.      5. Severe pulmonary hypertension - likely secondary to HFpEF and likely improved with diuresis.    - Will consider repeat echocardiogram in the future when patient is euvolemic.     6. Multiple myeloma with mets to bone - follows with Dr. Roberts   - Chemo therapy currently on hold, son states may be restarted the end of August.     7. Advance care planning - explored goals of care with patient/Son (Dannie) today, they both note they have seen a decline over past month, patient reports still wishes to be full code, reviewed with patient my concerns given her multiple co-morbidites/age that outcomes would likely be poor, she/son verbalized understanding and wish to further think about. States she has a health care directive completed, electing her daughter Eufemia as POA.    - Patient lives at home with her , they currently have homemaker services 3 x a week for 4hrs, explored that they likely needed increased support at home. Patient struggling with toileting resulting in her not taking furosemide.    - Introduced  the role of palliative care, patient/son wish to meet with Dr. Farris, referral placed today. Patient notes she will think about code status and is willing to further explore during palliative care visit.     Changes today: DECREASE diltiazem to 120mg daily, advised patient to start taking furosemide 40mg daily consistently.     Follow up plan:     CORE followup with Elisabeth in 1 week with labs     Palliative care referral     Follow up with Dr. Rivera in 1 month.         History of Presenting Illness:    Amira Arreola is a very pleasant 86 year old female with a history of HFpEF, chronic atrial fibrillation, hypertension, and hx of multiple myeloma mets to bone (dx 2016, currently being treated with Daratumab, Velcade, and Dexamethasone every 2 weeks).     Hospitalized 3/22/19-3/28/19 presented with dyspnea, leg swelling and intermittent palpitations. Patient was found to have atrial fibrillation with RVR and acute diastolic heart failure exacerbation. BNP elevated at 4828, CXR with bilateral moderate effusions with infiltrates vs. Atelectasis. Echocardiogram completed 3/23/19 LVEF 55-60%, no wall motion abnormalities, moderate mitral regurgitation, moderate tricuspid regurgitation, and severe pulmonary hypertension. Oral diltiazem started and metoprolol succinate escalated while hospitalized to help with rate control. Diuresed with IV furosemide and discharged on PO furosemide 40mg BID. Weight 3/24/19 147# (doesn't appear admission weight was completed). Discharge weight 135#. Discharged to TCU.     During last CORE visit on 5/15/19 diltiazem was increased to 180mg daily (due to elevated heart rates) and furosemide was increased to 40mg BID x 2 days, then to resume 40mg daily.     Patient is here today for CORE followup. Son present for clinic visit. Patient reports she has been feeling weak for the past week. Son and patient both note decline in health. Monitoring weights dailys at home. Has been ranging 129-134#,  "which is stable since last clinic visit. Patient has been skipping her multiple doses of her furosemide due to incontinence and frequency of urination. She didn't take a dose today. States at most she is taking 4 days a week. Worsening lower extremity edema. Denies abdominal distention/bloating. Denies shortness of breath at rest. Denies exertional dyspnea with current levels of activity, notes she will take breaks. Unclear how forth coming the patient is being with her symptoms. Patient states \"I am managing at home\" with my son's help. States she is sleeping a lot. Son notes she is not going down stairs as much as she used too, he also notes it is more difficult for her to get into the house from the garage. Patient states that going shopping is now a hassel, this was something she used to enjoy. Daughter will help with dressing. Sleeping good. Denies orthopnea or PND. Sleepign with 1 pillow. Patient denies chest pain or chest tightness. Denies dizziness, lightheadedness or other presyncopal symptoms. Denies tachycardia or palpitations. Denies bleeding episodes. Some complaints of chronic back pain. Denies falls. Chemo therapy was stopped until August, wanted to give her a break due to fatigue. C RN coming to the house, patient states they aren't checking blood pressure. Has homemaker services 3 x a week for 4 hours helping cleaning and bathing. Daughter setting up pill boxes. Using a walker/wheelchair for assitance.    Labs from show stable kidney function and sodium 134 otherwise stable electrolytes. Blood pressure 88/54 and HR 80.    Appetite is good. Son helps with dinner. Eating meals at home. Not adding salt to foods. Patient reports drinking < 2L daily. No tobacco or alcohol use. Living with her son at home and daughter living next door.         Recent Hospitalizations   3/22/19-3/28/19 presented with dyspnea, leg swelling and intermittent palpitations. Patient was found to have atrial fibrillation with " RVR and acute diastolic heart failure exacerbation. Weight 3/24/19 147# (doesn't appear admission weight was completed). Discharge weight 135#.        Social History    , 6 children, Enjoys keeping the house clean and orderly. Retired nurse.   Social History     Socioeconomic History     Marital status:      Spouse name: Tom     Number of children: 6     Years of education: Not on file     Highest education level: Not on file   Occupational History     Occupation: rn anesthetist     Employer: RETIRED   Social Needs     Financial resource strain: Not on file     Food insecurity:     Worry: Not on file     Inability: Not on file     Transportation needs:     Medical: Not on file     Non-medical: Not on file   Tobacco Use     Smoking status: Never Smoker     Smokeless tobacco: Never Used   Substance and Sexual Activity     Alcohol use: No     Alcohol/week: 0.0 oz     Drug use: No     Sexual activity: Never   Lifestyle     Physical activity:     Days per week: Not on file     Minutes per session: Not on file     Stress: Not on file   Relationships     Social connections:     Talks on phone: Not on file     Gets together: Not on file     Attends Mu-ism service: Not on file     Active member of club or organization: Not on file     Attends meetings of clubs or organizations: Not on file     Relationship status: Not on file     Intimate partner violence:     Fear of current or ex partner: Not on file     Emotionally abused: Not on file     Physically abused: Not on file     Forced sexual activity: Not on file   Other Topics Concern     Parent/sibling w/ CABG, MI or angioplasty before 65F 55M? Not Asked   Social History Narrative     Not on file            Review of Systems:   Skin:  Positive for bruising   Eyes:  Positive for glasses  ENT:  Negative    Respiratory:  Negative    Cardiovascular:  Negative edema;Positive for  Gastroenterology: Positive for constipation  Genitourinary:  Negative   "  Musculoskeletal:  Positive for arthritis  Neurologic:  Negative    Psychiatric:  Positive for anxiety  Heme/Lymph/Imm:  Positive for    Endocrine:  Negative           Physical Exam:   Vitals: BP (!) 88/54   Pulse 80   Ht 1.651 m (5' 5\")   Wt 60.8 kg (134 lb)   SpO2 94%   BMI 22.30 kg/m     Wt Readings from Last 4 Encounters:   06/26/19 60.8 kg (134 lb)   06/05/19 60.3 kg (132 lb 15 oz)   06/05/19 60.3 kg (133 lb)   05/22/19 59.8 kg (131 lb 13.4 oz)     GEN: frail, elderly  HEENT:  Pupils equal, round. Sclerae nonicteric.   NECK: Supple, no masses appreciated. JVP elevated to jaw at 90 degrees  C/V:  Irregularly irregular rate and rhythm, no murmur, rub or gallop.   RESP: Respirations are unlabored. Clear bilaterally. Diminished in bases.   GI: Abdomen distended, nontender.  EXTREM: +2 bilateral LE edema, small weeping areas on left foot.    NEURO: Alert and cooperative.  SKIN: Warm and dry.        Data:   ECHO 3/23/19  The left ventricle is normal in size.  The visual ejection fraction is estimated at 55-60%.  No regional wall motion abnormalities noted.  There is moderate (2+) mitral regurgitation.  There is moderate (2+) tricuspid regurgitation.  Severe (>55mmHg) pulmonary hypertension is present.  The rhythm was rapid atrial fibrillation.    LIPID RESULTS:  Lab Results   Component Value Date    CHOL 160 10/12/2016    HDL 76 10/12/2016    LDL 71 10/12/2016    TRIG 66 10/12/2016    CHOLHDLRATIO 2.3 09/21/2015     LIVER ENZYME RESULTS:  Lab Results   Component Value Date    AST 15 05/22/2019    ALT 34 05/22/2019     CBC RESULTS:  Lab Results   Component Value Date    WBC 10.4 06/05/2019    RBC 3.62 (L) 06/05/2019    HGB 11.2 (L) 06/05/2019    HCT 34.1 (L) 06/05/2019    MCV 94 06/05/2019    MCH 30.9 06/05/2019    MCHC 32.8 06/05/2019    RDW 18.2 (H) 06/05/2019     06/05/2019     BMP RESULTS:  Lab Results   Component Value Date     (L) 06/26/2019    POTASSIUM 4.2 06/26/2019    CHLORIDE 105 " 06/26/2019    CO2 23 06/26/2019    ANIONGAP 10.2 06/26/2019     (H) 06/26/2019    BUN 16 06/26/2019    CR 0.82 06/26/2019    GFRESTIMATED 66 06/26/2019    GFRESTBLACK 80 06/26/2019    BRADLEY 9.5 06/26/2019      INR RESULTS:  Lab Results   Component Value Date    INR 3.2 (A) 06/25/2019    INR 2.08 (H) 06/05/2019            Medications     Current Outpatient Medications   Medication Sig Dispense Refill     Acetaminophen (TYLENOL PO) Take 500 mg by mouth every 6 hours as needed for mild pain or fever       acyclovir (ZOVIRAX) 400 MG tablet Take 1 tablet (400 mg) by mouth 2 times daily 60 tablet 3     Carboxymethylcellulose Sod PF (REFRESH PLUS) 0.5 % SOLN ophthalmic solution 1 drop 4 times daily as needed for dry eyes       dexamethasone (DECADRON) 4 MG tablet Take 20mg (5 tablets) by mouth every week. 28 tablet 3     dexamethasone (DECADRON) 4 MG tablet Take 20mg (5 tablets) by mouth every week.. 28 tablet 0     diltiazem ER (DILT-XR) 120 MG 24 hr capsule Take 1 capsule (120 mg) by mouth daily 30 capsule 1     furosemide (LASIX) 40 MG tablet Take 1 tablet (40 mg) by mouth daily 60 tablet 1     latanoprost (XALATAN) 0.005 % ophthalmic solution Place 1 drop into the right eye daily   6     lidocaine-prilocaine (EMLA) cream Apply to port site 1 hour prior to access 30 g 1     metoprolol succinate ER (TOPROL-XL) 50 MG 24 hr tablet Take 3 tablets (150 mg) by mouth 2 times daily 180 tablet 3     Multiple Vitamin (DAILY MULTIVITAMIN PO) Take 1 tablet by mouth daily.       oxyCODONE (ROXICODONE) 5 MG tablet Take 1 tablet (5 mg) by mouth every 4 hours as needed for pain maximum 4 tablet(s) per day 30 tablet 0     polyethylene glycol (MIRALAX/GLYCOLAX) powder Take 17 g by mouth daily as needed        potassium chloride (KLOR-CON) 20 MEQ packet Take 20 mEq by mouth 2 times daily 60 packet 1     prochlorperazine (COMPAZINE) 10 MG tablet Take 1 tablet (10 mg) by mouth every 6 hours as needed for nausea or vomiting 30 tablet 1      SIMBRINZA 1-0.2 % ophthalmic suspension Place 1 drop into the right eye 2 times daily 1 drop AM and PM  2     Sodium Fluoride (SF 5000 PLUS) 1.1 % CREA Apply to affected area 3 times daily       vitamin D3 (VITAMIN D3) 1000 units (25 mcg) tablet Take 1,000 Units by mouth daily       warfarin (COUMADIN) 4 MG tablet TAKE ONE TO TWO TABLETS BY MOUTH EVERY DAY AS DIRECTED BY INR CLINIC 180 tablet 0          Past Medical History     Past Medical History:   Diagnosis Date     Abnormal CXR 2018    then ct done and not significant     Acute diastolic heart failure (H) 03/22/2019    nl ef, 2+mr and tr with severe pulm htn     Ascending aorta dilatation (H) 04/2016    on echo, mild, fu 7/18 4.0, slightly larger     Cancer, metastatic to bone (H)     due to myeloma     Colonic polyp 2008    adenomatous, fu 2013 tics only     Compression fracture 2016    multiple areas of spine     Dry eyes      Elevated MCV 2015    b12 and folic acid nl     HTN (hypertension) 2000    off meds for years     Lung nodule 08/2018    on ct, 4mm, ct done for fu abnl cxr     Menorrhagia 2002    hysteroscopy and d and c done     MGUS (monoclonal gammopathy of unknown significance) 2015    eval by Dr. Roberts     Multiple myeloma (H) 2016    dx 5/16 at [place], bone lesions seen on mri 6/16     OAB (overactive bladder) 2013    Dr. Grullon     Osteoporosis     fu done 2010 and stable, went off meds then, fu done 2013; has had gyn fu and added evista 2013 by gyn     Palpitations 4/16    nl echo, mildly dilated asc aorta     Paroxysmal atrial fibrillation (H) 4/16    had palp and ziopatch showed it, echo nl lv fxn, mild mr and tr, added coum and toprol, toprol dose raised 12/22/16     Pulmonary hypertension (H) 03/2019    seen on echo     Sciatica of left side 12/13    Dr. Helen Ricardo 2004     SVT (supraventricular tachycardia) (H) 4/16    on ziopatch     Thrombocytopenia (H) 2014     Past Surgical History:   Procedure Laterality Date     BONE  MARROW BIOPSY, BONE SPECIMEN, NEEDLE/TROCAR N/A 2016    Procedure: BIOPSY BONE MARROW;  Surgeon: Bryan Patel MD;  Location:  GI     CATARACT IOL, RT/LT        SECTION  1965, 1966     COLONOSCOPY  2013    Procedure: COLONOSCOPY;  COLONOSCOPY;  Surgeon: Steffany Rockwell MD;  Location:  GI     EXCISE EXOSTOSIS TIBIA / FIBULA  2014    Procedure: EXCISE EXOSTOSIS TIBIA / FIBULA;  Surgeon: Naila Pichardo MD;  Location:  [facility]     hysteroscopy and d and c      due to bleeding     left anle replacement       right ankle surgery       Family History   Problem Relation Age of Onset     Heart Disease Father      C.A.D. Mother      Cerebrovascular Disease Brother      Family History Negative Sister      Family History Negative Sister      Family History Negative Brother             Allergies   Blood transfusion related (informational only) and Penicillin [penicillins]        [facility]  Pager: [phone]

## 2019-06-26 NOTE — LETTER
6/26/2019    Addy Frias MD  [address]  [facility] [address]    RE: Amira Arreola       Dear Colleague,    I had the pleasure of seeing Amira Arreola in the [facility].    Cardiology Clinic Progress Note  Amira Arreola MRN# [id number]   YOB: 1932 Age: 86 year old   Primary Cardiologist: Dr. Rivera Reason for visit: CORE follow up            Assessment and Plan:   Amira Arreola is a very pleasant 86 year old female with a history of HFpEF, chronic atrial fibrillation, hypertension, and hx of multiple myeloma mets to bone (dx 2016, currently being treated with Daratumab, Velcade, and Dexamethasone every 2 weeks). Hospitalized 3/22/19-3/28/19 presented with dyspnea, leg swelling and intermittent palpitations. Patient was found to have atrial fibrillation with RVR and acute diastolic heart failure exacerbation. Patient discharged home from TCU on 4/11/19. Patient here today for CORE follow up.    1.  Chronic diastolic heart failure/HFpEF - Echocardiogram completed 3/23/19 LVEF 55-60%, no wall motion abnormalities, moderate mitral regurgitation, moderate tricuspid regurgitation, and severe pulmonary hypertension. Weight today 134#, which is slightly up from prior visits 125-128#. Patient appears volume up on exam. Labs from today show stable kidney function, sodium 134 other electrolytes stable. Worsening volume status is likely secondary to her skipping multiple doses of her diuretic, due to frequent urination/incontinence and inability to get to the bathroom.      - NYHA class III, stage C   - Fluid status : euvolemic   - Diuretic regimen : furosemide to 40mg daily   - Aldosterone antagonist : no room in blood pressure   - Blood pressure : controlled   - Reinforced HF education, reviewed low sodium diet and reading labels today.    - Currently monitoring weight closely at home and supportive/involved children, reinforced the importance of calling for  weight changes. If difficulties with managing weight/fluids will enroll in telehealth tracker.    - Educated patient on the importance of taking furosemide daily, explored options with patient and son to help with incontinence/toileting    2. Chronic atrial fibrillation - stable, asymptomatic, rates controlled, during last clinic visit rates were elevated and diltiazem was increased. Now rates controlled but hypotensive with c/o of fatigue/weakness. Decreasing diltiazem today with close followup, will consider initiation of digoxin if rates become elevated.    - DECREASE diltiazem to 120mg daily   - Continue metoprolol XL 150mg BID   - [id number] score 5 (HTN, HF, age, female)   - Continue warfarin for thromboembolic prophylaxis.     3. Hypotension - decrease diltiazem today to 120mg, may need to further titrate medications down if BP remains elevated. Plan for close clinic follow up.     4. Moderate mitral regurgitation, moderate tricuspid regurgitation   - Will consider repeat echocardiogram in the future when patient is euvolemic or if worsening symptoms, currently won't change any management clinically.      5. Severe pulmonary hypertension - likely secondary to HFpEF and likely improved with diuresis.    - Will consider repeat echocardiogram in the future when patient is euvolemic.     6. Multiple myeloma with mets to bone - follows with Dr. Roberts   - Chemo therapy currently on hold, son states may be restarted the end of August.     7. Advance care planning - explored goals of care with patient/Son (Dannie) today, they both note they have seen a decline over past month, patient reports still wishes to be full code, reviewed with patient my concerns given her multiple co-morbidites/age that outcomes would likely be poor, she/son verbalized understanding and wish to further think about. States she has a health care directive completed, electing her daughter Eufemia as POA.    - Patient lives at home with her  , they currently have homemaker services 3 x a week for 4hrs, explored that they likely needed increased support at home. Patient struggling with toileting resulting in her not taking furosemide.    - Introduced the role of palliative care, patient/son wish to meet with Dr. Farris, referral placed today. Patient notes she will think about code status and is willing to further explore during palliative care visit.     Changes today: DECREASE diltiazem to 120mg daily, advised patient to start taking furosemide 40mg daily consistently.     Follow up plan:     CORE followup with Eilsabeth in 1 week with labs     Palliative care referral     Follow up with Dr. Rivera in 1 month.         History of Presenting Illness:    Amira Arreola is a very pleasant 86 year old female with a history of HFpEF, chronic atrial fibrillation, hypertension, and hx of multiple myeloma mets to bone (dx 2016, currently being treated with Daratumab, Velcade, and Dexamethasone every 2 weeks).     Hospitalized 3/22/19-3/28/19 presented with dyspnea, leg swelling and intermittent palpitations. Patient was found to have atrial fibrillation with RVR and acute diastolic heart failure exacerbation. BNP elevated at 4828, CXR with bilateral moderate effusions with infiltrates vs. Atelectasis. Echocardiogram completed 3/23/19 LVEF 55-60%, no wall motion abnormalities, moderate mitral regurgitation, moderate tricuspid regurgitation, and severe pulmonary hypertension. Oral diltiazem started and metoprolol succinate escalated while hospitalized to help with rate control. Diuresed with IV furosemide and discharged on PO furosemide 40mg BID. Weight 3/24/19 147# (doesn't appear admission weight was completed). Discharge weight 135#. Discharged to TCU.     During last CORE visit on 5/15/19 diltiazem was increased to 180mg daily (due to elevated heart rates) and furosemide was increased to 40mg BID x 2 days, then to resume 40mg daily.     Patient is here today  "for CORE followup. Son present for clinic visit. Patient reports she has been feeling weak for the past week. Son and patient both note decline in health. Monitoring weights dailys at home. Has been ranging 129-134#, which is stable since last clinic visit. Patient has been skipping her multiple doses of her furosemide due to incontinence and frequency of urination. She didn't take a dose today. States at most she is taking 4 days a week. Worsening lower extremity edema. Denies abdominal distention/bloating. Denies shortness of breath at rest. Denies exertional dyspnea with current levels of activity, notes she will take breaks. Unclear how forth coming the patient is being with her symptoms. Patient states \"I am managing at home\" with my son's help. States she is sleeping a lot. Son notes she is not going down stairs as much as she used too, he also notes it is more difficult for her to get into the house from the garage. Patient states that going shopping is now a hassel, this was something she used to enjoy. Daughter will help with dressing. Sleeping good. Denies orthopnea or PND. Sleepign with 1 pillow. Patient denies chest pain or chest tightness. Denies dizziness, lightheadedness or other presyncopal symptoms. Denies tachycardia or palpitations. Denies bleeding episodes. Some complaints of chronic back pain. Denies falls. Chemo therapy was stopped until August, wanted to give her a break due to fatigue. C RN coming to the house, patient states they aren't checking blood pressure. Has homemaker services 3 x a week for 4 hours helping cleaning and bathing. Daughter setting up pill boxes. Using a walker/wheelchair for assitance.    Labs from show stable kidney function and sodium 134 otherwise stable electrolytes. Blood pressure 88/54 and HR 80.    Appetite is good. Son helps with dinner. Eating meals at home. Not adding salt to foods. Patient reports drinking < 2L daily. No tobacco or alcohol use. Living with " her son at home and daughter living next door.         Recent Hospitalizations   3/22/19-3/28/19 presented with dyspnea, leg swelling and intermittent palpitations. Patient was found to have atrial fibrillation with RVR and acute diastolic heart failure exacerbation. Weight 3/24/19 147# (doesn't appear admission weight was completed). Discharge weight 135#.        Social History    , 6 children, Enjoys keeping the house clean and orderly. Retired nurse.   Social History     Socioeconomic History     Marital status:      Spouse name: Tom     Number of children: 6     Years of education: Not on file     Highest education level: Not on file   Occupational History     Occupation: rn anesthetist     Employer: RETIRED   Social Needs     Financial resource strain: Not on file     Food insecurity:     Worry: Not on file     Inability: Not on file     Transportation needs:     Medical: Not on file     Non-medical: Not on file   Tobacco Use     Smoking status: Never Smoker     Smokeless tobacco: Never Used   Substance and Sexual Activity     Alcohol use: No     Alcohol/week: 0.0 oz     Drug use: No     Sexual activity: Never   Lifestyle     Physical activity:     Days per week: Not on file     Minutes per session: Not on file     Stress: Not on file   Relationships     Social connections:     Talks on phone: Not on file     Gets together: Not on file     Attends Taoism service: Not on file     Active member of club or organization: Not on file     Attends meetings of clubs or organizations: Not on file     Relationship status: Not on file     Intimate partner violence:     Fear of current or ex partner: Not on file     Emotionally abused: Not on file     Physically abused: Not on file     Forced sexual activity: Not on file   Other Topics Concern     Parent/sibling w/ CABG, MI or angioplasty before 65F 55M? Not Asked   Social History Narrative     Not on file            Review of Systems:   Skin:  Positive  "for bruising   Eyes:  Positive for glasses  ENT:  Negative    Respiratory:  Negative    Cardiovascular:  Negative edema;Positive for  Gastroenterology: Positive for constipation  Genitourinary:  Negative    Musculoskeletal:  Positive for arthritis  Neurologic:  Negative    Psychiatric:  Positive for anxiety  Heme/Lymph/Imm:  Positive for    Endocrine:  Negative           Physical Exam:   Vitals: BP (!) 88/54   Pulse 80   Ht 1.651 m (5' 5\")   Wt 60.8 kg (134 lb)   SpO2 94%   BMI 22.30 kg/m      Wt Readings from Last 4 Encounters:   06/26/19 60.8 kg (134 lb)   06/05/19 60.3 kg (132 lb 15 oz)   06/05/19 60.3 kg (133 lb)   05/22/19 59.8 kg (131 lb 13.4 oz)     GEN: frail, elderly  HEENT:  Pupils equal, round. Sclerae nonicteric.   NECK: Supple, no masses appreciated. JVP elevated to jaw at 90 degrees  C/V:  Irregularly irregular rate and rhythm, no murmur, rub or gallop.   RESP: Respirations are unlabored. Clear bilaterally. Diminished in bases.   GI: Abdomen distended, nontender.  EXTREM: +2 bilateral LE edema, small weeping areas on left foot.    NEURO: Alert and cooperative.  SKIN: Warm and dry.        Data:   ECHO 3/23/19  The left ventricle is normal in size.  The visual ejection fraction is estimated at 55-60%.  No regional wall motion abnormalities noted.  There is moderate (2+) mitral regurgitation.  There is moderate (2+) tricuspid regurgitation.  Severe (>55mmHg) pulmonary hypertension is present.  The rhythm was rapid atrial fibrillation.    LIPID RESULTS:  Lab Results   Component Value Date    CHOL 160 10/12/2016    HDL 76 10/12/2016    LDL 71 10/12/2016    TRIG 66 10/12/2016    CHOLHDLRATIO 2.3 09/21/2015     LIVER ENZYME RESULTS:  Lab Results   Component Value Date    AST 15 05/22/2019    ALT 34 05/22/2019     CBC RESULTS:  Lab Results   Component Value Date    WBC 10.4 06/05/2019    RBC 3.62 (L) 06/05/2019    HGB 11.2 (L) 06/05/2019    HCT 34.1 (L) 06/05/2019    MCV 94 06/05/2019    MCH 30.9 " 06/05/2019    MCHC 32.8 06/05/2019    RDW 18.2 (H) 06/05/2019     06/05/2019     BMP RESULTS:  Lab Results   Component Value Date     (L) 06/26/2019    POTASSIUM 4.2 06/26/2019    CHLORIDE 105 06/26/2019    CO2 23 06/26/2019    ANIONGAP 10.2 06/26/2019     (H) 06/26/2019    BUN 16 06/26/2019    CR 0.82 06/26/2019    GFRESTIMATED 66 06/26/2019    GFRESTBLACK 80 06/26/2019    BRADLEY 9.5 06/26/2019      INR RESULTS:  Lab Results   Component Value Date    INR 3.2 (A) 06/25/2019    INR 2.08 (H) 06/05/2019            Medications     Current Outpatient Medications   Medication Sig Dispense Refill     Acetaminophen (TYLENOL PO) Take 500 mg by mouth every 6 hours as needed for mild pain or fever       acyclovir (ZOVIRAX) 400 MG tablet Take 1 tablet (400 mg) by mouth 2 times daily 60 tablet 3     Carboxymethylcellulose Sod PF (REFRESH PLUS) 0.5 % SOLN ophthalmic solution 1 drop 4 times daily as needed for dry eyes       dexamethasone (DECADRON) 4 MG tablet Take 20mg (5 tablets) by mouth every week. 28 tablet 3     dexamethasone (DECADRON) 4 MG tablet Take 20mg (5 tablets) by mouth every week.. 28 tablet 0     diltiazem ER (DILT-XR) 120 MG 24 hr capsule Take 1 capsule (120 mg) by mouth daily 30 capsule 1     furosemide (LASIX) 40 MG tablet Take 1 tablet (40 mg) by mouth daily 60 tablet 1     latanoprost (XALATAN) 0.005 % ophthalmic solution Place 1 drop into the right eye daily   6     lidocaine-prilocaine (EMLA) cream Apply to port site 1 hour prior to access 30 g 1     metoprolol succinate ER (TOPROL-XL) 50 MG 24 hr tablet Take 3 tablets (150 mg) by mouth 2 times daily 180 tablet 3     Multiple Vitamin (DAILY MULTIVITAMIN PO) Take 1 tablet by mouth daily.       oxyCODONE (ROXICODONE) 5 MG tablet Take 1 tablet (5 mg) by mouth every 4 hours as needed for pain maximum 4 tablet(s) per day 30 tablet 0     polyethylene glycol (MIRALAX/GLYCOLAX) powder Take 17 g by mouth daily as needed        potassium chloride  (KLOR-CON) 20 MEQ packet Take 20 mEq by mouth 2 times daily 60 packet 1     prochlorperazine (COMPAZINE) 10 MG tablet Take 1 tablet (10 mg) by mouth every 6 hours as needed for nausea or vomiting 30 tablet 1     SIMBRINZA 1-0.2 % ophthalmic suspension Place 1 drop into the right eye 2 times daily 1 drop AM and PM  2     Sodium Fluoride (SF 5000 PLUS) 1.1 % CREA Apply to affected area 3 times daily       vitamin D3 (VITAMIN D3) 1000 units (25 mcg) tablet Take 1,000 Units by mouth daily       warfarin (COUMADIN) 4 MG tablet TAKE ONE TO TWO TABLETS BY MOUTH EVERY DAY AS DIRECTED BY INR CLINIC 180 tablet 0          Past Medical History     Past Medical History:   Diagnosis Date     Abnormal CXR 2018    then ct done and not significant     Acute diastolic heart failure (H) 03/22/2019    nl ef, 2+mr and tr with severe pulm htn     Ascending aorta dilatation (H) 04/2016    on echo, mild, fu 7/18 4.0, slightly larger     Cancer, metastatic to bone (H)     due to myeloma     Colonic polyp 2008    adenomatous, fu 2013 tics only     Compression fracture 2016    multiple areas of spine     Dry eyes      Elevated MCV 2015    b12 and folic acid nl     HTN (hypertension) 2000    off meds for years     Lung nodule 08/2018    on ct, 4mm, ct done for fu abnl cxr     Menorrhagia 2002    hysteroscopy and d and c done     MGUS (monoclonal gammopathy of unknown significance) 2015    eval by Dr. Roberts     Multiple myeloma (H) 2016    dx 5/16 at [facility], bone lesions seen on mri 6/16     OAB (overactive bladder) 2013    Dr. Grullon     Osteoporosis     fu done 2010 and stable, went off meds then, fu done 2013; has had gyn fu and added evista 2013 by gyn     Palpitations 4/16    nl echo, mildly dilated asc aorta     Paroxysmal atrial fibrillation (H) 4/16    had palp and ziopatch showed it, echo nl lv fxn, mild mr and tr, added coum and toprol, toprol dose raised 12/22/16     Pulmonary hypertension (H) 03/2019    seen on echo     Sciatica of  left side     Dr. Helen Ricardo      SVT (supraventricular tachycardia) (H)     on ziopatch     Thrombocytopenia (H)      Past Surgical History:   Procedure Laterality Date     BONE MARROW BIOPSY, BONE SPECIMEN, NEEDLE/TROCAR N/A 2016    Procedure: BIOPSY BONE MARROW;  Surgeon: Bryan Patel MD;  Location:  GI     CATARACT IOL, RT/LT        SECTION  1965, 1966     COLONOSCOPY  2013    Procedure: COLONOSCOPY;  COLONOSCOPY;  Surgeon: Steffany Rockwell MD;  Location:  GI     EXCISE EXOSTOSIS TIBIA / FIBULA  2014    Procedure: EXCISE EXOSTOSIS TIBIA / FIBULA;  Surgeon: Naila Pichardo MD;  Location:  SD     hysteroscopy and d and c      due to bleeding     left anle replacement       right ankle surgery       Family History   Problem Relation Age of Onset     Heart Disease Father      C.A.D. Mother      Cerebrovascular Disease Brother      Family History Negative Sister      Family History Negative Sister      Family History Negative Brother             Allergies   Blood transfusion related (informational only) and Penicillin [penicillins]        DAYSI Arellano CNP  [facility]  Pager: [phone]    Thank you for allowing me to participate in the care of your patient.    Sincerely,     DAYSI Arellano CNP     [facility]

## 2019-06-28 ENCOUNTER — HOSPITAL ENCOUNTER (INPATIENT)
Facility: CLINIC | Age: 84
LOS: 3 days | Discharge: HOME HEALTH | DRG: 308 | End: 2019-07-01
Attending: EMERGENCY MEDICINE | Admitting: HOSPITALIST
Payer: MEDICARE

## 2019-06-28 ENCOUNTER — APPOINTMENT (OUTPATIENT)
Dept: GENERAL RADIOLOGY | Facility: CLINIC | Age: 84
DRG: 308 | End: 2019-06-28
Attending: EMERGENCY MEDICINE
Payer: MEDICARE

## 2019-06-28 ENCOUNTER — TELEPHONE (OUTPATIENT)
Facility: CLINIC | Age: 84
End: 2019-06-28

## 2019-06-28 DIAGNOSIS — I48.91 ATRIAL FIBRILLATION WITH RVR (H): ICD-10-CM

## 2019-06-28 DIAGNOSIS — I48.91 ATRIAL FIBRILLATION WITH RAPID VENTRICULAR RESPONSE (H): Primary | ICD-10-CM

## 2019-06-28 LAB
ALBUMIN SERPL-MCNC: 3.1 G/DL (ref 3.4–5)
ALP SERPL-CCNC: 62 U/L (ref 40–150)
ALT SERPL W P-5'-P-CCNC: 79 U/L (ref 0–50)
ANION GAP SERPL CALCULATED.3IONS-SCNC: 6 MMOL/L (ref 3–14)
AST SERPL W P-5'-P-CCNC: 48 U/L (ref 0–45)
BASOPHILS # BLD AUTO: 0 10E9/L (ref 0–0.2)
BASOPHILS NFR BLD AUTO: 0.1 %
BILIRUB SERPL-MCNC: 0.5 MG/DL (ref 0.2–1.3)
BUN SERPL-MCNC: 22 MG/DL (ref 7–30)
CALCIUM SERPL-MCNC: 9 MG/DL (ref 8.5–10.1)
CHLORIDE SERPL-SCNC: 104 MMOL/L (ref 94–109)
CO2 SERPL-SCNC: 28 MMOL/L (ref 20–32)
CREAT SERPL-MCNC: 0.7 MG/DL (ref 0.52–1.04)
DIFFERENTIAL METHOD BLD: ABNORMAL
EOSINOPHIL # BLD AUTO: 0 10E9/L (ref 0–0.7)
EOSINOPHIL NFR BLD AUTO: 0.1 %
ERYTHROCYTE [DISTWIDTH] IN BLOOD BY AUTOMATED COUNT: 19.1 % (ref 10–15)
GFR SERPL CREATININE-BSD FRML MDRD: 78 ML/MIN/{1.73_M2}
GLUCOSE SERPL-MCNC: 140 MG/DL (ref 70–99)
HCT VFR BLD AUTO: 38.4 % (ref 35–47)
HGB BLD-MCNC: 12.9 G/DL (ref 11.7–15.7)
IMM GRANULOCYTES # BLD: 0.1 10E9/L (ref 0–0.4)
IMM GRANULOCYTES NFR BLD: 1.5 %
INR PPP: 3.89 (ref 0.86–1.14)
LYMPHOCYTES # BLD AUTO: 0.5 10E9/L (ref 0.8–5.3)
LYMPHOCYTES NFR BLD AUTO: 6.5 %
MCH RBC QN AUTO: 32.1 PG (ref 26.5–33)
MCHC RBC AUTO-ENTMCNC: 33.6 G/DL (ref 31.5–36.5)
MCV RBC AUTO: 96 FL (ref 78–100)
MONOCYTES # BLD AUTO: 1 10E9/L (ref 0–1.3)
MONOCYTES NFR BLD AUTO: 13.6 %
NEUTROPHILS # BLD AUTO: 5.6 10E9/L (ref 1.6–8.3)
NEUTROPHILS NFR BLD AUTO: 78.2 %
NRBC # BLD AUTO: 0.1 10*3/UL
NRBC BLD AUTO-RTO: 1 /100
PLATELET # BLD AUTO: 199 10E9/L (ref 150–450)
POTASSIUM SERPL-SCNC: 3.6 MMOL/L (ref 3.4–5.3)
PROT SERPL-MCNC: 6.2 G/DL (ref 6.8–8.8)
RBC # BLD AUTO: 4.02 10E12/L (ref 3.8–5.2)
SODIUM SERPL-SCNC: 138 MMOL/L (ref 133–144)
TROPONIN I SERPL-MCNC: <0.015 UG/L (ref 0–0.04)
WBC # BLD AUTO: 7.2 10E9/L (ref 4–11)

## 2019-06-28 PROCEDURE — 85025 COMPLETE CBC W/AUTO DIFF WBC: CPT | Performed by: EMERGENCY MEDICINE

## 2019-06-28 PROCEDURE — 83880 ASSAY OF NATRIURETIC PEPTIDE: CPT | Performed by: EMERGENCY MEDICINE

## 2019-06-28 PROCEDURE — 21000001 ZZH R&B HEART CARE

## 2019-06-28 PROCEDURE — 25000125 ZZHC RX 250: Performed by: EMERGENCY MEDICINE

## 2019-06-28 PROCEDURE — 99285 EMERGENCY DEPT VISIT HI MDM: CPT | Mod: 25

## 2019-06-28 PROCEDURE — 85610 PROTHROMBIN TIME: CPT | Performed by: EMERGENCY MEDICINE

## 2019-06-28 PROCEDURE — 99223 1ST HOSP IP/OBS HIGH 75: CPT | Mod: AI | Performed by: HOSPITALIST

## 2019-06-28 PROCEDURE — 80053 COMPREHEN METABOLIC PANEL: CPT | Performed by: EMERGENCY MEDICINE

## 2019-06-28 PROCEDURE — 84484 ASSAY OF TROPONIN QUANT: CPT | Performed by: EMERGENCY MEDICINE

## 2019-06-28 PROCEDURE — 71046 X-RAY EXAM CHEST 2 VIEWS: CPT

## 2019-06-28 PROCEDURE — 93005 ELECTROCARDIOGRAM TRACING: CPT

## 2019-06-28 PROCEDURE — 96374 THER/PROPH/DIAG INJ IV PUSH: CPT

## 2019-06-28 RX ORDER — DILTIAZEM HYDROCHLORIDE 5 MG/ML
10 INJECTION INTRAVENOUS ONCE
Status: COMPLETED | OUTPATIENT
Start: 2019-06-28 | End: 2019-06-28

## 2019-06-28 RX ORDER — LATANOPROST 50 UG/ML
1 SOLUTION/ DROPS OPHTHALMIC AT BEDTIME
COMMUNITY

## 2019-06-28 RX ORDER — ACETAMINOPHEN 500 MG
500-1000 TABLET ORAL EVERY 6 HOURS PRN
COMMUNITY

## 2019-06-28 RX ORDER — WARFARIN SODIUM 4 MG/1
TABLET ORAL
COMMUNITY
End: 2021-06-23

## 2019-06-28 RX ADMIN — DILTIAZEM HYDROCHLORIDE 10 MG: 5 INJECTION INTRAVENOUS at 21:27

## 2019-06-28 ASSESSMENT — ACTIVITIES OF DAILY LIVING (ADL)
WHICH_OF_THE_ABOVE_FUNCTIONAL_RISKS_HAD_A_RECENT_ONSET_OR_CHANGE?: COGNITION
COGNITION: 1 - ATTENTION OR MEMORY DEFICITS
TRANSFERRING: 1-->ASSISTIVE EQUIPMENT
RETIRED_EATING: 0-->INDEPENDENT
FALL_HISTORY_WITHIN_LAST_SIX_MONTHS: YES
AMBULATION: 1-->ASSISTIVE EQUIPMENT
DRESS: 2-->ASSISTIVE PERSON
SWALLOWING: 0-->SWALLOWS FOODS/LIQUIDS WITHOUT DIFFICULTY
TOILETING: 1-->ASSISTIVE EQUIPMENT
BATHING: 3-->ASSISTIVE EQUIPMENT AND PERSON
RETIRED_COMMUNICATION: 0-->UNDERSTANDS/COMMUNICATES WITHOUT DIFFICULTY

## 2019-06-28 ASSESSMENT — ENCOUNTER SYMPTOMS
SHORTNESS OF BREATH: 1
WEAKNESS: 1
FEVER: 1

## 2019-06-28 ASSESSMENT — MIFFLIN-ST. JEOR
SCORE: 1016.95
SCORE: 1017.73

## 2019-06-28 NOTE — LETTER
Transition Communication Hand-off for Care Transitions to Next Level of Care Provider    Name: Amira Arreola  : 1932  MRN #: [id number]  Primary Care Provider: Addy Frias     Primary Clinic: [address]  [place] [address]     Reason for Hospitalization:  Atrial fibrillation with RVR (H) [I48.91]  Admit Date/Time: 2019  8:57 PM  Discharge Date: 2019  Payor Source: Payor: MEDICARE / Plan: MEDICARE / Product Type: Medicare /     Readmission Assessment Measure (ELISABETH) Risk Score/category: elevated          Reason for Communication Hand-off Referral: Fragility    Discharge Plan: discharge to home with [facility] HH PT/OT/RN.  Pt already had HHA through [facility]       Concern for non-adherence with plan of care:   Y/N N  Discharge Needs Assessment:  Needs      Most Recent Value   Equipment Currently Used at Home  walker, rolling   # of Referrals Placed by [facility]  Homecare          Already enrolled in Tele-monitoring program and name of program:  N  Follow-up specialty is recommended: Yes    Follow-up plan:    Future Appointments   Date Time Provider Department Center   2019  2:30 PM Kimmie Bill, OT [facility]   2019  6:00 AM Magy Lopez, PT [facility]   7/3/2019 12:10 PM MA LAB [facility]   7/3/2019  1:10 PM Elisabeth Means, APRN CNP [facility]   7/10/2019  1:00 PM  [facility]   2019 12:00 PM  [facility]   2019  1:00 PM Shayne Roberts MD [facility]       Any outstanding tests or procedures:        Referrals     Future Labs/Procedures    Home care nursing referral     Comments:    RN skilled nursing visit. RN to assess vital signs and weight, respiratory and cardiac status and home safety.  RN to teach medication management.    Your provider has ordered home care nursing services. If you have not been contacted within 2 days of your discharge please call the inpatient department  phone number at [phone] .    Home Care OT Referral for Hospital Discharge     Comments:    OT to eval and treat    Your provider has ordered home care - occupational therapy. If you have not been contacted within 2 days of your discharge please call the department phone number listed on the top of this document.    Home Care PT Referral for Hospital Discharge     Comments:    PT to eval and treat    Your provider has ordered home care - physical therapy. If you have not been contacted within 2 days of your discharge please call the department phone number listed on the top of this document.            Key Recommendations:  Chronic afib, now with RVR              - CHADS VASC 3, on coumadin              - dilt decreased in clinic due to hypotension sbp 80s              - bps stable hear and hr controlled, if problematic in the future could consider dilt or amio     2.  HFpEF, with severe PH              - dry wt felt to be 125-128#              - wt today 129, jvp visible and at ~8 cm, but LLL breath sounds are absent concern for effusion              - on lasix 40 mg pta     3.  Multiple myeloma with mets- chemo on hold, end of life discussions ongoing, outpt plan to see Dr. Farris     4.  Moderate MR, mod TR- can follow     Thanks for following this patient post discharge from the hospital.  Has appts with APRN, wanted to wait to follow up with PCP.  Plan to have palliative discussion outpatient.     Galina Carroll    AVS/Discharge Summary is the source of truth; this is a helpful guide for improved communication of patient story

## 2019-06-29 PROBLEM — I48.91 ATRIAL FIBRILLATION WITH RAPID VENTRICULAR RESPONSE (H): Status: ACTIVE | Noted: 2019-06-29

## 2019-06-29 LAB
ALBUMIN UR-MCNC: NEGATIVE MG/DL
APPEARANCE UR: CLEAR
BILIRUB UR QL STRIP: NEGATIVE
COLOR UR AUTO: ABNORMAL
GLUCOSE UR STRIP-MCNC: NEGATIVE MG/DL
HGB UR QL STRIP: NEGATIVE
HYALINE CASTS #/AREA URNS LPF: 7 /LPF (ref 0–2)
INR PPP: 2.85 (ref 0.86–1.14)
INTERPRETATION ECG - MUSE: NORMAL
KETONES UR STRIP-MCNC: NEGATIVE MG/DL
LEUKOCYTE ESTERASE UR QL STRIP: NEGATIVE
MUCOUS THREADS #/AREA URNS LPF: PRESENT /LPF
NITRATE UR QL: NEGATIVE
NT-PROBNP SERPL-MCNC: ABNORMAL PG/ML (ref 0–1800)
PH UR STRIP: 5 PH (ref 5–7)
RBC #/AREA URNS AUTO: 1 /HPF (ref 0–2)
SOURCE: ABNORMAL
SP GR UR STRIP: 1.01 (ref 1–1.03)
SQUAMOUS #/AREA URNS AUTO: 1 /HPF (ref 0–1)
UROBILINOGEN UR STRIP-MCNC: NORMAL MG/DL (ref 0–2)
WBC #/AREA URNS AUTO: 1 /HPF (ref 0–5)

## 2019-06-29 PROCEDURE — 21000001 ZZH R&B HEART CARE

## 2019-06-29 PROCEDURE — 99223 1ST HOSP IP/OBS HIGH 75: CPT | Performed by: INTERNAL MEDICINE

## 2019-06-29 PROCEDURE — 25000132 ZZH RX MED GY IP 250 OP 250 PS 637: Mod: GY | Performed by: HOSPITALIST

## 2019-06-29 PROCEDURE — 25000128 H RX IP 250 OP 636: Performed by: HOSPITALIST

## 2019-06-29 PROCEDURE — 25000132 ZZH RX MED GY IP 250 OP 250 PS 637: Mod: GY

## 2019-06-29 PROCEDURE — 99232 SBSQ HOSP IP/OBS MODERATE 35: CPT | Performed by: HOSPITALIST

## 2019-06-29 PROCEDURE — 81001 URINALYSIS AUTO W/SCOPE: CPT | Performed by: HOSPITALIST

## 2019-06-29 PROCEDURE — 85610 PROTHROMBIN TIME: CPT | Performed by: HOSPITALIST

## 2019-06-29 PROCEDURE — 36415 COLL VENOUS BLD VENIPUNCTURE: CPT | Performed by: HOSPITALIST

## 2019-06-29 PROCEDURE — 25000128 H RX IP 250 OP 636: Performed by: INTERNAL MEDICINE

## 2019-06-29 RX ORDER — POLYETHYLENE GLYCOL 3350 17 G/17G
17 POWDER, FOR SOLUTION ORAL DAILY PRN
Status: DISCONTINUED | OUTPATIENT
Start: 2019-06-29 | End: 2019-07-01 | Stop reason: HOSPADM

## 2019-06-29 RX ORDER — ONDANSETRON 4 MG/1
4 TABLET, ORALLY DISINTEGRATING ORAL EVERY 6 HOURS PRN
Status: DISCONTINUED | OUTPATIENT
Start: 2019-06-29 | End: 2019-07-01 | Stop reason: HOSPADM

## 2019-06-29 RX ORDER — POLYETHYLENE GLYCOL 3350 17 G/17G
17 POWDER, FOR SOLUTION ORAL DAILY PRN
Status: DISCONTINUED | OUTPATIENT
Start: 2019-06-29 | End: 2019-06-29

## 2019-06-29 RX ORDER — WARFARIN SODIUM 2 MG/1
2 TABLET ORAL
Status: COMPLETED | OUTPATIENT
Start: 2019-06-29 | End: 2019-06-29

## 2019-06-29 RX ORDER — LIDOCAINE 40 MG/G
CREAM TOPICAL
Status: DISCONTINUED | OUTPATIENT
Start: 2019-06-29 | End: 2019-07-01 | Stop reason: HOSPADM

## 2019-06-29 RX ORDER — ACETAMINOPHEN 650 MG/1
650 SUPPOSITORY RECTAL EVERY 4 HOURS PRN
Status: DISCONTINUED | OUTPATIENT
Start: 2019-06-29 | End: 2019-07-01 | Stop reason: HOSPADM

## 2019-06-29 RX ORDER — DILTIAZEM HYDROCHLORIDE 120 MG/1
120 CAPSULE, EXTENDED RELEASE ORAL DAILY
Status: DISCONTINUED | OUTPATIENT
Start: 2019-06-29 | End: 2019-06-30

## 2019-06-29 RX ORDER — POTASSIUM CHLORIDE 1.5 G/1.58G
20-40 POWDER, FOR SOLUTION ORAL
Status: DISCONTINUED | OUTPATIENT
Start: 2019-06-29 | End: 2019-07-01 | Stop reason: HOSPADM

## 2019-06-29 RX ORDER — FUROSEMIDE 10 MG/ML
40 INJECTION INTRAMUSCULAR; INTRAVENOUS ONCE
Status: COMPLETED | OUTPATIENT
Start: 2019-06-29 | End: 2019-06-29

## 2019-06-29 RX ORDER — HEPARIN SODIUM (PORCINE) LOCK FLUSH IV SOLN 100 UNIT/ML 100 UNIT/ML
5 SOLUTION INTRAVENOUS
Status: DISCONTINUED | OUTPATIENT
Start: 2019-06-29 | End: 2019-07-01 | Stop reason: HOSPADM

## 2019-06-29 RX ORDER — LIDOCAINE 40 MG/G
CREAM TOPICAL
Status: DISCONTINUED | OUTPATIENT
Start: 2019-06-29 | End: 2019-06-29

## 2019-06-29 RX ORDER — ACYCLOVIR 400 MG/1
400 TABLET ORAL
Status: DISCONTINUED | OUTPATIENT
Start: 2019-06-29 | End: 2019-07-01 | Stop reason: HOSPADM

## 2019-06-29 RX ORDER — POTASSIUM CL/LIDO/0.9 % NACL 10MEQ/0.1L
10 INTRAVENOUS SOLUTION, PIGGYBACK (ML) INTRAVENOUS
Status: DISCONTINUED | OUTPATIENT
Start: 2019-06-29 | End: 2019-07-01 | Stop reason: HOSPADM

## 2019-06-29 RX ORDER — POTASSIUM CHLORIDE 7.45 MG/ML
10 INJECTION INTRAVENOUS
Status: DISCONTINUED | OUTPATIENT
Start: 2019-06-29 | End: 2019-07-01 | Stop reason: HOSPADM

## 2019-06-29 RX ORDER — HEPARIN SODIUM,PORCINE 10 UNIT/ML
5-10 VIAL (ML) INTRAVENOUS EVERY 24 HOURS
Status: DISCONTINUED | OUTPATIENT
Start: 2019-06-29 | End: 2019-07-01 | Stop reason: HOSPADM

## 2019-06-29 RX ORDER — NALOXONE HYDROCHLORIDE 0.4 MG/ML
.1-.4 INJECTION, SOLUTION INTRAMUSCULAR; INTRAVENOUS; SUBCUTANEOUS
Status: DISCONTINUED | OUTPATIENT
Start: 2019-06-29 | End: 2019-07-01 | Stop reason: HOSPADM

## 2019-06-29 RX ORDER — POTASSIUM CHLORIDE 29.8 MG/ML
20 INJECTION INTRAVENOUS
Status: DISCONTINUED | OUTPATIENT
Start: 2019-06-29 | End: 2019-07-01 | Stop reason: HOSPADM

## 2019-06-29 RX ORDER — POTASSIUM CHLORIDE 1500 MG/1
20-40 TABLET, EXTENDED RELEASE ORAL
Status: DISCONTINUED | OUTPATIENT
Start: 2019-06-29 | End: 2019-07-01 | Stop reason: HOSPADM

## 2019-06-29 RX ORDER — FUROSEMIDE 40 MG
40 TABLET ORAL DAILY
Status: DISCONTINUED | OUTPATIENT
Start: 2019-06-29 | End: 2019-07-01 | Stop reason: HOSPADM

## 2019-06-29 RX ORDER — ACETAMINOPHEN 325 MG/1
650 TABLET ORAL EVERY 4 HOURS PRN
Status: DISCONTINUED | OUTPATIENT
Start: 2019-06-29 | End: 2019-07-01 | Stop reason: HOSPADM

## 2019-06-29 RX ORDER — AMOXICILLIN 250 MG
2 CAPSULE ORAL 2 TIMES DAILY
Status: DISCONTINUED | OUTPATIENT
Start: 2019-06-29 | End: 2019-07-01 | Stop reason: HOSPADM

## 2019-06-29 RX ORDER — HEPARIN SODIUM,PORCINE 10 UNIT/ML
5-10 VIAL (ML) INTRAVENOUS
Status: DISCONTINUED | OUTPATIENT
Start: 2019-06-29 | End: 2019-07-01 | Stop reason: HOSPADM

## 2019-06-29 RX ORDER — CARBOXYMETHYLCELLULOSE SODIUM 5 MG/ML
1 SOLUTION/ DROPS OPHTHALMIC 4 TIMES DAILY PRN
Status: DISCONTINUED | OUTPATIENT
Start: 2019-06-29 | End: 2019-07-01 | Stop reason: HOSPADM

## 2019-06-29 RX ORDER — POTASSIUM CHLORIDE 1.5 G/1.58G
20 POWDER, FOR SOLUTION ORAL 2 TIMES DAILY
Status: DISCONTINUED | OUTPATIENT
Start: 2019-06-29 | End: 2019-07-01 | Stop reason: HOSPADM

## 2019-06-29 RX ORDER — ONDANSETRON 2 MG/ML
4 INJECTION INTRAMUSCULAR; INTRAVENOUS EVERY 6 HOURS PRN
Status: DISCONTINUED | OUTPATIENT
Start: 2019-06-29 | End: 2019-07-01 | Stop reason: HOSPADM

## 2019-06-29 RX ORDER — LATANOPROST 50 UG/ML
1 SOLUTION/ DROPS OPHTHALMIC AT BEDTIME
Status: DISCONTINUED | OUTPATIENT
Start: 2019-06-29 | End: 2019-07-01 | Stop reason: HOSPADM

## 2019-06-29 RX ORDER — AMOXICILLIN 250 MG
1 CAPSULE ORAL 2 TIMES DAILY
Status: DISCONTINUED | OUTPATIENT
Start: 2019-06-29 | End: 2019-07-01 | Stop reason: HOSPADM

## 2019-06-29 RX ADMIN — POTASSIUM CHLORIDE 20 MEQ: 1.5 POWDER, FOR SOLUTION ORAL at 21:33

## 2019-06-29 RX ADMIN — ACYCLOVIR 400 MG: 400 TABLET ORAL at 10:43

## 2019-06-29 RX ADMIN — FUROSEMIDE 40 MG: 40 TABLET ORAL at 10:43

## 2019-06-29 RX ADMIN — SODIUM CHLORIDE, PRESERVATIVE FREE 5 ML: 5 INJECTION INTRAVENOUS at 17:19

## 2019-06-29 RX ADMIN — ACYCLOVIR 400 MG: 400 TABLET ORAL at 17:11

## 2019-06-29 RX ADMIN — SODIUM CHLORIDE, PRESERVATIVE FREE 5 ML: 5 INJECTION INTRAVENOUS at 10:53

## 2019-06-29 RX ADMIN — DILTIAZEM HYDROCHLORIDE 120 MG: 120 CAPSULE, EXTENDED RELEASE ORAL at 10:43

## 2019-06-29 RX ADMIN — FUROSEMIDE 40 MG: 10 INJECTION, SOLUTION INTRAVENOUS at 17:11

## 2019-06-29 RX ADMIN — LATANOPROST 1 DROP: 50 SOLUTION/ DROPS OPHTHALMIC at 21:33

## 2019-06-29 RX ADMIN — WARFARIN SODIUM 2 MG: 2 TABLET ORAL at 17:11

## 2019-06-29 RX ADMIN — POTASSIUM CHLORIDE 20 MEQ: 1.5 POWDER, FOR SOLUTION ORAL at 10:44

## 2019-06-29 ASSESSMENT — ACTIVITIES OF DAILY LIVING (ADL)
ADLS_ACUITY_SCORE: 27
ADLS_ACUITY_SCORE: 23
ADLS_ACUITY_SCORE: 27
ADLS_ACUITY_SCORE: 23
ADLS_ACUITY_SCORE: 27

## 2019-06-29 NOTE — PHARMACY-ANTICOAGULATION SERVICE
Clinical Pharmacy - Warfarin Dosing Consult     Pharmacy has been consulted to manage this patient s warfarin therapy.  Indication: Atrial Fibrillation  Therapy Goal: INR 2-3  Warfarin Prior to Admission: Yes  Warfarin PTA Regimen: 4mg Tu sa; 2mg rest of week    INR   Date Value Ref Range Status   06/28/2019 3.89 (H) 0.86 - 1.14 Final   06/25/2019 3.2 (A) 0.8 - 1.1 Final      Pharmacy will monitor Amira Arreola daily and order warfarin doses to achieve specified goal.      Please contact pharmacy as soon as possible if the warfarin needs to be held for a procedure or if the warfarin goals change.

## 2019-06-29 NOTE — PHARMACY-ADMISSION MEDICATION HISTORY
Admission medication history interview status for the 6/28/2019  admission is complete. See EPIC admission navigator for prior to admission medications     Medication history source reliability:Good    Actions taken by pharmacist (provider contacted, etc): [employer]     Additional medication history information not noted on PTA med list: patient already took warfarin 2 mg dose today prior to coming to the hospital.     Of note, patient took her evening metoprolol dose, but caregiver reported that prior to leaving to the hospital, she noticed a metoprolol tablet left behind. She still had some metoprolol succinate 100 mg tablets left from a prior prescription and she set up a 100 mg and a 50 mg tablet for patient this evening to reduce pill burden. She is not sure if patient took the 100 mg or the 50 mg tablet.     Patient takes dexamethasone on Wednesdays. Last dose was this last Wednesday.     Also, patient is taking both metoprolol and diltiazem. Doses were confirmed with Sure[employer]s and caregiver.     Medication reconciliation/reorder completed by provider prior to medication history? Yes    Time spent in this activity: 20 mins    Prior to Admission medications    Medication Sig Last Dose Taking? Auth Provider   acetaminophen (TYLENOL) 500 MG tablet Take 500 mg by mouth every 6 hours as needed for mild pain   at PRN Yes Unknown, Entered By History   acyclovir (ZOVIRAX) 400 MG tablet Take 1 tablet (400 mg) by mouth 2 times daily 6/28/2019 at PM Yes Shayne Roberts MD   Carboxymethylcellulose Sod PF (REFRESH PLUS) 0.5 % SOLN ophthalmic solution 1 drop 4 times daily as needed for dry eyes  at PRN Yes Unknown, Entered By History   dexamethasone (DECADRON) 4 MG tablet Take 20mg (5 tablets) by mouth every week. 6/26/2019 at AM Yes Shayne Roberts MD   diltiazem ER (DILT-XR) 120 MG 24 hr capsule Take 1 capsule (120 mg) by mouth daily 6/28/2019 at AM Yes Elisabeth Means APRN CNP   diphenhydrAMINE-acetaminophen  (TYLENOL PM)  MG tablet Take 2 tablets by mouth At Bedtime 6/27/2019 at PM Yes Unknown, Entered By History   furosemide (LASIX) 40 MG tablet Take 1 tablet (40 mg) by mouth daily 6/28/2019 at AM Yes Elisabeth Means APRN CNP   latanoprost (XALATAN) 0.005 % ophthalmic solution Place 1 drop into the right eye At Bedtime 6/28/2019 at PM Yes Unknown, Entered By History   lidocaine-prilocaine (EMLA) cream Apply to port site 1 hour prior to access  at PRN Yes Shayne Roberts MD   metoprolol succinate ER (TOPROL-XL) 50 MG 24 hr tablet Take 3 tablets (150 mg) by mouth 2 times daily 6/28/2019 at PM Yes Elisabeth Means APRN CNP   Multiple Vitamins-Minerals (DAILY MULTIVITAMIN) CAPS Take 1 tablet by mouth daily  6/28/2019 at AM Yes Reported, Patient   oxyCODONE (ROXICODONE) 5 MG tablet Take 1 tablet (5 mg) by mouth every 4 hours as needed for pain maximum 4 tablet(s) per day  at PRN Yes Todd Loya APRN CNP   polyethylene glycol (MIRALAX/GLYCOLAX) powder Take 17 g by mouth daily as needed for constipation   at PRN Yes Reported, Patient   potassium chloride (KLOR-CON) 20 MEQ packet Take 20 mEq by mouth 2 times daily 6/28/2019 at PM Yes Elisabeth Means APRN CNP   prochlorperazine (COMPAZINE) 10 MG tablet Take 1 tablet (10 mg) by mouth every 6 hours as needed for nausea or vomiting  at PRN Yes Shayne Roberts MD   SIMBRINZA 1-0.2 % ophthalmic suspension Place 1 drop into the right eye 2 times daily 1 drop AM and PM 6/28/2019 at PM Yes Reported, Patient   Sodium Fluoride (SF 5000 PLUS) 1.1 % CREA Apply to affected area 3 times daily 6/28/2019 at PM Yes Unknown, Entered By History   vitamin D3 (VITAMIN D3) 1000 units (25 mcg) tablet Take 1,000 Units by mouth daily 6/28/2019 at AM Yes Reported, Patient   warfarin (COUMADIN) 4 MG tablet Take one tablet (4 mg) by mouth on Tuesdays and Saturdays; take one-half tablet ( 2 mg) on all other days of the week. 6/28/2019 at PM - 2 mg Yes Unknown, Entered By History

## 2019-06-29 NOTE — PROGRESS NOTES
[facility]    Medicine Progress Note - Hospitalist Service       Date of Admission:  6/28/2019  Assessment & Plan       Assessment & Plan      Amira Arreola is a 86 year old woman with PMH significant for his heart failure, chronic atrial fibrillation anticoagulated on warfarin, thrombocytopenia, multiple myeloma, pulmonary hypertension, SVT, multiple other comorbidities and recent cognitive decline who comes to the ED from home with her daughter with complaints of fatigue and weakness,and found to have a fibb with RVR         Fatigue, weakness  Ongoing progressive cognitive decline  Likely this is secondary to atrial fibrillation with RVR and chronic cognitive changes with advanced age and underlying multiple comorbidities.  -- PT/OT following     Atrial fibrillation with RVR  Chronic A fib on warfarin  Supratherapeutic INR 3.89  diltiazem was decreased a few days ago reportedly from 180 mg daily to 120 due to hypotension.   - Chadsvasc 5 (HTN, HF, female, age)  - PTA regimen includes diltiazem  once daily and metoprolol succinate  bid mg daily  - We will plan on continuing home regimen and consider increasing dose depending on her heart rates trend tonight  - INR 3.9 on admission. Labs are pending for today.  - Holding coumaidin pending INR results.      Acute on chronic diastolic heart failure  Bilateral pleural effusions, likely basilar atelectasis  Severe pulmonary hypertension  Mod mitral regurg, mod tricuspid regurg  Follows with [facility], but she has apparently been declining lasix numerous days and has therefore had some increased edema, per her family.  - TTE 3/23 with EF 55-60% without WMA, 2+ MR, 2+TR, severe pulmonary hypertension and dilated IVC  - PTA regimen includes dilt er 120, metop succ 150 bid, lasix 40 daily  - Consult Cardiology  - Monitor ins and outs and daily weights  -continue PTA diltiazem and metoprolol with holding parameters.     Multiple myeloma with  "metastases to bone  - Follows with Dr Roberts with [facility]; previously on chemo which has been held for some time  - stable upon admission  - in consideration of her heart failure and other medical problems, palliative consult in clinic had been planned for this coming Tuesday  - will consult palliative care to expedite the process - pending her clinical course this consult may be cancelled if she is expected to go home in time to attend prior clinic appt with palliative     Goals of Care  - Recent [facility] clinic appointment with Dr. Means yielded a plan for palliative medicine consultation next Tuesday, hence palliative consulted for their input.    \"At the time of admission, patient's daughter Venice plans on discussing these issues with the patient and their family today (Saturday)\".       History of hypokalemia  - normal on admission  - PTA Kcl supp continued     Glaucoma  - Stable  - PTA regimen of eye drops to continue      Shingles  - I am not aware of any open sores  - PTA acyclovir bid can be continued     Chronic pain d/t history of thoracic compression fracture  - stable  - PTA pain regimen continued as long as she is not too somnolent     Diet: regular diet,   DVT Prophylaxis: On systemic anticogulation  Russo Catheter: not present  Code Status: Full Code      Disposition Plan   Expected discharge: 1 -2 days, recommended to prior living arrangement once pendindg palliative care and Cardiology input.  Entered: Kelvin Acuna MD 06/29/2019, 9:35 AM       The patient's care was discussed with the Bedside Nurse and Patient.    Kelvin Acuna MD  Hospitalist Service  [facility]    ______________________________________________________________________    Interval History   Slept well. No palpitations chest pian or cough. Denies also fever and chills     Data reviewed today: I reviewed all medications, new labs and imaging results over the last 24 hours. I personally " reviewed no images or EKG's today.    Physical Exam   Vital Signs: Temp: 97.9  F (36.6  C) Temp src: Axillary BP: 121/68 Pulse: 97 Heart Rate: 63 Resp: 18 SpO2: 96 % O2 Device: Oxymask Oxygen Delivery: 4 LPM  Weight: 134 lbs 3.2 oz  General Appearance: Alert in NAD  Respiratory:  CTA no wheezing   Cardiovascular:  irrgular rhythm , S1 S2 heard  GI: Sof NT/ND + BS   Skin: warm dry no rashes      Data   Recent Labs   Lab 06/29/19  1056 06/28/19  2114 06/26/19  1104 06/25/19   WBC  --  7.2  --   --    HGB  --  12.9  --   --    MCV  --  96  --   --    PLT  --  199  --   --    INR 2.85* 3.89*  --  3.2*   NA  --  138 134*  --    POTASSIUM  --  3.6 4.2  --    CHLORIDE  --  104 105  --    CO2  --  28 23  --    BUN  --  22 16  --    CR  --  0.70 0.82  --    ANIONGAP  --  6 10.2  --    BRADLEY  --  9.0 9.5  --    GLC  --  140* 156*  --    ALBUMIN  --  3.1*  --   --    PROTTOTAL  --  6.2*  --   --    BILITOTAL  --  0.5  --   --    ALKPHOS  --  62  --   --    ALT  --  79*  --   --    AST  --  48*  --   --    TROPI  --  <0.015  --   --

## 2019-06-29 NOTE — PLAN OF CARE
Pt has been pain free with v/signs stable and maintaining her O2 sats in the mid 90's on Rm air. Dr Daniel ( Cardiology) did consult on the patient when family was present. Lasix Iv was ordered and given at 1711. BNP on Admission 13,367. Pt is able to state name an B-date but thinks she is at her home. Remains in A-fib with HR ( with activity) and is on Coumadin (Today INR 2.85.) Family left for the evening.

## 2019-06-29 NOTE — CONSULTS
[facility]    Cardiology Consultation     Date of Admission:  6/28/2019    Assessment & Plan   Amira Arreola is a 86 year old female who was admitted on 6/28/2019. I was asked to see the patient for atrial fibrillation and HFPEF.    1. Chronic atrial fibrillation with inadequately controlled ventricular response on chronic anticoagulation with Coumadin  2. Acute on chronic diastolic congestive heart failure  3. Severe pulmonary hypertension  4. Moderate mitral and tricuspid valve regurgitation  5. Metastatic multiple myeloma  6. Chronic pain with history of thoracic compression fracture  7. Goals of care    It was a pleasure seeing Mrs. Arreola along with her family today with the cardiology consult service.  I reviewed these recommendations with them in the hospital in detail.    Ultimately, I think that her uncontrolled atrial fibrillation was what led to her current presentation.  She had her rates into the 150s and in the setting of her diastolic congestive heart failure and severe pulmonary hypertension, I do think this led to significant dyspnea and fatigue.  Her diltiazem was recently reduced due to concern for hypotension from 180  to 120 mg.  She has signs and symptoms of volume overload and as such does warrant diuretic therapy.    She received the diltiazem 120 mg dose today and her heart rates are now in the 100s.  We will consider increasing back to 180 mg dose tomorrow if she has uncontrolled heart rates overnight.  I do think she warrants further diuresis and will give her a dose of IV Lasix today.    If due to significant hypotension we are unable to increase her diltiazem and she continues to have rapid atrial fibrillation, it would be worth discussing with electrophysiology the potential for AV node ablation and pacemaker implantation.  However, given her multiple comorbidities, she may not be a good candidate due to expected length of life.    Ultimately, I think that we need to  continue to move forward with ongoing goals of care discussion given her multiple comorbidities and metastatic multiple myeloma.  I agree with palliative care involvement. She herself did not want to come into the hospital so I think it would be beneficial to establish if she would prefer to continue to come in or if we focus on palliative care at home.     The cardiology team will continue to follow along.  Please page with any questions or concerns.    Jake Daniel MD    Code Status    Full Code    Reason for Consult   Reason for consult: Atrial fibrillation and HFPEF    Primary Care Physician   Addy Frias    Chief Complaint   Atrial fibrillation and HFPEF    History is obtained from the electronic health record, patient's daughter and patient's spouse    History of Present Illness   Amira Arreola is a 86 year old female who presents with uncontrolled atrial fibrillation and heart failure preserved ejection fraction.  She has a long-standing history of chronic atrial fibrillation on anticoagulation with Coumadin.  Her rates have been very difficult to control though at the same token her diltiazem was recently decreased from 180 mg to 120 mg due to hypotension.  She also has heart failure with preserved ejection fraction and is on chronic diuretic therapy for this.    I received a call yesterday from her daughter who reported rapid heart rates to the 150s as well as generalized malaise and fatigue.  Her recommended presented to the emergency department for further evaluation.  There, she was found to be in atrial fibrillation with rapid ventricular response and rates in the 150s to 160s.  She received IV diltiazem with improvement in her heart rate.  Her NT proBNP is elevated at 13,367.  She was placed on her maintenance oral diuretic and outpatient dose of diltiazem 120 mg daily for today.  She is also on metoprolol succinate 150 mg twice a day.  Cardiology was consulted for ongoing recommendations.    At  the time that visit with her, she was very fatigued though around lunchtime she was much more alert and interactive.  Full review of systems was unable to be performed due to her fatigue and inability to answer questions as she was sleeping.    Past Medical History   Unable to perform full review of systems due to sedation. Per EMR:    I have reviewed this patient's medical history and updated it with pertinent information if needed.   Past Medical History:   Diagnosis Date     Abnormal CXR 2018    then ct done and not significant     Acute diastolic heart failure (H) 03/22/2019    nl ef, 2+mr and tr with severe pulm htn     Ascending aorta dilatation (H) 04/2016    on echo, mild, fu 7/18 4.0, slightly larger     Cancer, metastatic to bone (H)     due to myeloma     Colonic polyp 2008    adenomatous, fu 2013 tics only     Compression fracture 2016    multiple areas of spine     Dry eyes      Elevated MCV 2015    b12 and folic acid nl     HTN (hypertension) 2000    off meds for years     Lung nodule 08/2018    on ct, 4mm, ct done for fu abnl cxr     Menorrhagia 2002    hysteroscopy and d and c done     MGUS (monoclonal gammopathy of unknown significance) 2015    eval by Dr. Roberts     Multiple myeloma (H) 2016    dx 5/16 at [facility], bone lesions seen on mri 6/16     OAB (overactive bladder) 2013    Dr. Grullon     Osteoporosis     fu done 2010 and stable, went off meds then, fu done 2013; has had gyn fu and added evista 2013 by gyn     Palpitations 4/16    nl echo, mildly dilated asc aorta     Paroxysmal atrial fibrillation (H) 4/16    had palp and ziopatch showed it, echo nl lv fxn, mild mr and tr, added coum and toprol, toprol dose raised 12/22/16     Pulmonary hypertension (H) 03/2019    seen on echo     Sciatica of left side 12/13    Dr. Helen Ricardo 2004     SVT (supraventricular tachycardia) (H) 4/16    on ziopatch     Thrombocytopenia (H) 2014       Past Surgical History   Unable to perform full review  of systems due to sedation. Per EMR:    I have reviewed this patient's surgical history and updated it with pertinent information if needed.  Past Surgical History:   Procedure Laterality Date     BONE MARROW BIOPSY, BONE SPECIMEN, NEEDLE/TROCAR N/A 2016    Procedure: BIOPSY BONE MARROW;  Surgeon: Bryan Patel MD;  Location:  GI     CATARACT IOL, RT/LT        SECTION  1965, 1966     COLONOSCOPY  2013    Procedure: COLONOSCOPY;  COLONOSCOPY;  Surgeon: Steffany Rockwell MD;  Location:  GI     EXCISE EXOSTOSIS TIBIA / FIBULA  2014    Procedure: EXCISE EXOSTOSIS TIBIA / FIBULA;  Surgeon: Naila Pichardo MD;  Location:  [facility]     hysteroscopy and d and c      due to bleeding     left anle replacement       right ankle surgery         Prior to Admission Medications   Prior to Admission Medications   Prescriptions Last Dose Informant Patient Reported? Taking?   Carboxymethylcellulose Sod PF (REFRESH PLUS) 0.5 % SOLN ophthalmic solution  at PRN Care Giver Yes Yes   Si drop 4 times daily as needed for dry eyes   Multiple Vitamins-Minerals (DAILY MULTIVITAMIN) CAPS 2019 at AM Care Giver Yes Yes   Sig: Take 1 tablet by mouth daily    SIMBRINZA 1-0.2 % ophthalmic suspension 2019 at PM Care Giver Yes Yes   Sig: Place 1 drop into the right eye 2 times daily 1 drop AM and PM   Sodium Fluoride (SF 5000 PLUS) 1.1 % CREA 2019 at PM Care Giver Yes Yes   Sig: Apply to affected area 3 times daily   acetaminophen (TYLENOL) 500 MG tablet  at PRN Care Giver Yes Yes   Sig: Take 500 mg by mouth every 6 hours as needed for mild pain    acyclovir (ZOVIRAX) 400 MG tablet 2019 at PM Care Giver No Yes   Sig: Take 1 tablet (400 mg) by mouth 2 times daily   dexamethasone (DECADRON) 4 MG tablet 2019 at AM Care Giver No Yes   Sig: Take 20mg (5 tablets) by mouth every week.   diltiazem ER (DILT-XR) 120 MG 24 hr capsule 2019 at AM Care Giver No Yes   Sig:  Take 1 capsule (120 mg) by mouth daily   diphenhydrAMINE-acetaminophen (TYLENOL PM)  MG tablet 6/27/2019 at PM Care Giver Yes Yes   Sig: Take 2 tablets by mouth At Bedtime   furosemide (LASIX) 40 MG tablet 6/28/2019 at AM Care Giver No Yes   Sig: Take 1 tablet (40 mg) by mouth daily   latanoprost (XALATAN) 0.005 % ophthalmic solution 6/28/2019 at PM Care Giver Yes Yes   Sig: Place 1 drop into the right eye At Bedtime   lidocaine-prilocaine (EMLA) cream  at PRN Care Giver No Yes   Sig: Apply to port site 1 hour prior to access   metoprolol succinate ER (TOPROL-XL) 50 MG 24 hr tablet 6/28/2019 at PM Care Giver No Yes   Sig: Take 3 tablets (150 mg) by mouth 2 times daily   oxyCODONE (ROXICODONE) 5 MG tablet  at PRN Care Giver No Yes   Sig: Take 1 tablet (5 mg) by mouth every 4 hours as needed for pain maximum 4 tablet(s) per day   polyethylene glycol (MIRALAX/GLYCOLAX) powder  at PRN Care Giver Yes Yes   Sig: Take 17 g by mouth daily as needed for constipation    potassium chloride (KLOR-CON) 20 MEQ packet 6/28/2019 at PM Care Giver No Yes   Sig: Take 20 mEq by mouth 2 times daily   prochlorperazine (COMPAZINE) 10 MG tablet  at PRN Care Giver No Yes   Sig: Take 1 tablet (10 mg) by mouth every 6 hours as needed for nausea or vomiting   vitamin D3 (VITAMIN D3) 1000 units (25 mcg) tablet 6/28/2019 at AM Care Giver Yes Yes   Sig: Take 1,000 Units by mouth daily   warfarin (COUMADIN) 4 MG tablet 6/28/2019 at PM - 2 mg Care Giver Yes Yes   Sig: Take one tablet (4 mg) by mouth on Tuesdays and Saturdays; take one-half tablet ( 2 mg) on all other days of the week.      Facility-Administered Medications: None     Allergies   Allergies   Allergen Reactions     Blood Transfusion Related (Informational Only)      Blood antigens related to receiving Darzelex-AG     Penicillin [Penicillins] Rash     Blotches on chest        Social History   Unable to perform full review of systems due to sedation. Per EMR:    I have reviewed  this patient's social history and updated it with pertinent information if needed. Amira Arreola  reports that she has never smoked. She has never used smokeless tobacco. She reports that she does not drink alcohol or use drugs.    Family History   Unable to perform full review of systems due to sedation. Per EMR:    I have reviewed this patient's family history and updated it with pertinent information if needed.   Family History   Problem Relation Age of Onset     Heart Disease Father      C.A.D. Mother      Cerebrovascular Disease Brother      Family History Negative Sister      Family History Negative Sister      Family History Negative Brother        Review of Systems   Unable to perform full review of systems due to sedation.     Physical Exam   Temp: 98.7  F (37.1  C) Temp src: Oral BP: 99/59 Pulse: 97 Heart Rate: 89 Resp: 16 SpO2: 95 % O2 Device: None (Room air) Oxygen Delivery: 4 LPM  Vital Signs with Ranges  Temp:  [97.9  F (36.6  C)-99.9  F (37.7  C)] 98.7  F (37.1  C)  Pulse:  [] 97  Heart Rate:  [] 89  Resp:  [10-22] 16  BP: ()/() 99/59  SpO2:  [88 %-98 %] 95 %  134 lbs 3.2 oz     Constitutional: Very sleepy when I visited with her.   Eyes: No xanthelasma or conjunctivitis  HEENT: Moist oral mucosa  Respiratory: Mild crackles at bases bilaterally.   Cardiovascular: Irregularly-irregular rhythm with mildly tachycardic rate.   Lymph/Hematologic: No purpura or petechiae.  Skin: No stasis dermatitis. No major rashes.  Extremities: No peripheral edema.  Neurologic: Very sleep when I visited with her.   Psychiatric: Minimally responsive to questions.    Data   Results for orders placed or performed during the hospital encounter of 06/28/19 (from the past 24 hour(s))   EKG 12 lead   Result Value Ref Range    Interpretation ECG Click View Image link to view waveform and result    CBC with platelets differential   Result Value Ref Range    WBC 7.2 4.0 - 11.0 10e9/L    RBC Count 4.02  3.8 - 5.2 10e12/L    Hemoglobin 12.9 11.7 - 15.7 g/dL    Hematocrit 38.4 35.0 - 47.0 %    MCV 96 78 - 100 fl    MCH 32.1 26.5 - 33.0 pg    MCHC 33.6 31.5 - 36.5 g/dL    RDW 19.1 (H) 10.0 - 15.0 %    Platelet Count 199 150 - 450 10e9/L    Diff Method Automated Method     % Neutrophils 78.2 %    % Lymphocytes 6.5 %    % Monocytes 13.6 %    % Eosinophils 0.1 %    % Basophils 0.1 %    % Immature Granulocytes 1.5 %    Nucleated RBCs 1 (H) 0 /100    Absolute Neutrophil 5.6 1.6 - 8.3 10e9/L    Absolute Lymphocytes 0.5 (L) 0.8 - 5.3 10e9/L    Absolute Monocytes 1.0 0.0 - 1.3 10e9/L    Absolute Eosinophils 0.0 0.0 - 0.7 10e9/L    Absolute Basophils 0.0 0.0 - 0.2 10e9/L    Abs Immature Granulocytes 0.1 0 - 0.4 10e9/L    Absolute Nucleated RBC 0.1    Comprehensive metabolic panel   Result Value Ref Range    Sodium 138 133 - 144 mmol/L    Potassium 3.6 3.4 - 5.3 mmol/L    Chloride 104 94 - 109 mmol/L    Carbon Dioxide 28 20 - 32 mmol/L    Anion Gap 6 3 - 14 mmol/L    Glucose 140 (H) 70 - 99 mg/dL    Urea Nitrogen 22 7 - 30 mg/dL    Creatinine 0.70 0.52 - 1.04 mg/dL    GFR Estimate 78 >60 mL/min/[1.73_m2]    GFR Estimate If Black >90 >60 mL/min/[1.73_m2]    Calcium 9.0 8.5 - 10.1 mg/dL    Bilirubin Total 0.5 0.2 - 1.3 mg/dL    Albumin 3.1 (L) 3.4 - 5.0 g/dL    Protein Total 6.2 (L) 6.8 - 8.8 g/dL    Alkaline Phosphatase 62 40 - 150 U/L    ALT 79 (H) 0 - 50 U/L    AST 48 (H) 0 - 45 U/L   Troponin I   Result Value Ref Range    Troponin I ES <0.015 0.000 - 0.045 ug/L   INR   Result Value Ref Range    INR 3.89 (H) 0.86 - 1.14   Nt probnp inpatient   Result Value Ref Range    N-Terminal Pro BNP Inpatient 13,367 (H) 0 - 1,800 pg/mL   XR Chest 2 Views    Narrative    CHEST TWO VIEWS    6/28/2019 10:54 PM     HISTORY: Congestive heart failure. Tachycardia.    COMPARISON: 3/22/2019.    FINDINGS: Right chest wall port. No pneumothorax. The heart is  enlarged. There is no pulmonary edema. There are bilateral pleural  effusions and  bibasilar probable atelectasis. Degenerative disease and  compression fractures in the thoracic spine as seen previously.      Impression    IMPRESSION: Bilateral pleural effusions and bibasilar infiltrates,  likely atelectasis.    ASCENCION EVANGELISTA MD   INR   Result Value Ref Range    INR 2.85 (H) 0.86 - 1.14

## 2019-06-29 NOTE — ED NOTES
"[facility]  ED Nurse Handoff Report    ED Chief complaint: Chest Pain (Pt. states having intermittant CP and SOB since lastnight. Concurrent increased confusion. History of A-fib. )      ED Diagnosis:   Final diagnoses:   Atrial fibrillation with RVR (H)       Code Status: Full Code    Allergies:   Allergies   Allergen Reactions     Blood Transfusion Related (Informational Only)      Blood antigens related to receiving Darzelex-AG     Penicillin [Penicillins] Rash     Blotches on chest        Activity level - Baseline/Home:  Independent  Activity Level - Current:   Stand with Assist of 2    Patient's Preferred language: English   Needed?: No    Isolation: No  Infection: Not Applicable  Bariatric?: No    Vital Signs:   Vitals:    06/28/19 2126 06/28/19 2146 06/28/19 2225 06/28/19 2245   BP: 115/83  93/59 115/83   Pulse: 99  78 100   Resp: 19 21 22    Temp:       TempSrc:       SpO2: 95% 94% 94%    Weight:       Height:           Cardiac Rhythm: ,   Cardiac  Cardiac Rhythm: Atrial fibrillation    Pain level: 0-10 Pain Scale: 3    Is this patient confused?: Yes   Does this patient have a guardian?  No         If yes, is there guardianship documents in the Epic \"Code/ACP\" activity?  N/A         Guardian Notified?  N/A  Daniels - Suicide Severity Rating Scale Completed?  Yes  If yes, what color did the patient score?  White    Patient Report: Initial Complaint: C/O increased weakness and intermittent SOB with some CP  Focused Assessment: Alert to self.  Daughter states that especially at night, pt has shown some signs of confusion (dementia).  Up with assistance of 2.  Incontinent of urine.  Pt was afib RVR (105-150) and pt HR now 80-90s.    Tests Performed: labs, xray  Abnormal Results:   Results for orders placed or performed during the hospital encounter of 06/28/19   CBC with platelets differential   Result Value Ref Range    WBC 7.2 4.0 - 11.0 10e9/L    RBC Count 4.02 3.8 - 5.2 10e12/L "    Hemoglobin 12.9 11.7 - 15.7 g/dL    Hematocrit 38.4 35.0 - 47.0 %    MCV 96 78 - 100 fl    MCH 32.1 26.5 - 33.0 pg    MCHC 33.6 31.5 - 36.5 g/dL    RDW 19.1 (H) 10.0 - 15.0 %    Platelet Count 199 150 - 450 10e9/L    Diff Method Automated Method     % Neutrophils 78.2 %    % Lymphocytes 6.5 %    % Monocytes 13.6 %    % Eosinophils 0.1 %    % Basophils 0.1 %    % Immature Granulocytes 1.5 %    Nucleated RBCs 1 (H) 0 /100    Absolute Neutrophil 5.6 1.6 - 8.3 10e9/L    Absolute Lymphocytes 0.5 (L) 0.8 - 5.3 10e9/L    Absolute Monocytes 1.0 0.0 - 1.3 10e9/L    Absolute Eosinophils 0.0 0.0 - 0.7 10e9/L    Absolute Basophils 0.0 0.0 - 0.2 10e9/L    Abs Immature Granulocytes 0.1 0 - 0.4 10e9/L    Absolute Nucleated RBC 0.1    Comprehensive metabolic panel   Result Value Ref Range    Sodium 138 133 - 144 mmol/L    Potassium 3.6 3.4 - 5.3 mmol/L    Chloride 104 94 - 109 mmol/L    Carbon Dioxide 28 20 - 32 mmol/L    Anion Gap 6 3 - 14 mmol/L    Glucose 140 (H) 70 - 99 mg/dL    Urea Nitrogen 22 7 - 30 mg/dL    Creatinine 0.70 0.52 - 1.04 mg/dL    GFR Estimate 78 >60 mL/min/[1.73_m2]    GFR Estimate If Black >90 >60 mL/min/[1.73_m2]    Calcium 9.0 8.5 - 10.1 mg/dL    Bilirubin Total 0.5 0.2 - 1.3 mg/dL    Albumin 3.1 (L) 3.4 - 5.0 g/dL    Protein Total 6.2 (L) 6.8 - 8.8 g/dL    Alkaline Phosphatase 62 40 - 150 U/L    ALT 79 (H) 0 - 50 U/L    AST 48 (H) 0 - 45 U/L   Troponin I   Result Value Ref Range    Troponin I ES <0.015 0.000 - 0.045 ug/L   INR   Result Value Ref Range    INR 3.89 (H) 0.86 - 1.14   EKG 12 lead   Result Value Ref Range    Interpretation ECG Click View Image link to view waveform and result      Treatments provided: 10mg diltiazem    Family Comments: daughter @ bedside    OBS brochure/video discussed/provided to patient/family: N/A              Name of person given brochure if not patient: NA              Relationship to patient: NA    ED Medications:   Medications   diltiazem (CARDIZEM) injection 10 mg  (10 mg Intravenous Given 6/28/19 2127)       Drips infusing?:  No    For the majority of the shift this patient was Green.   Interventions performed were NA.    Severe Sepsis OR Septic Shock Diagnosis Present: No    To be done/followed up on inpatient unit:  See [facility]    ED NURSE PHONE NUMBER: [id number]     RECEIVING UNIT ED HANDOFF REVIEW    ED Nurse Handoff Report was reviewed by: Mone Martínez on June 28, 2019 at 11:03 PM

## 2019-06-29 NOTE — TELEPHONE ENCOUNTER
I received a phone call tonight form Mrs. Arreola's daughter Venice. Her mother has developed worsening shortness of breath, tachycardia up to 150 bpm, and somnolence. They are having issues getting her up to do anything around the house. She has a history of HFPEF and chronic atrial fibrillation.     I discussed with her that at this point I think it would be exceptionally challenging to manage her with modification of her outpatient medications alone. I recommended presentation to the emergency department for assessment and likely treatment of uncontrolled atrial fibrillation with rapid ventricular response and acute decompensated heart failure.     Venice appreciated my call and will plan to bring her mother into the [facility] this evening.     Jake Daniel MD on 6/28/2019 at 8:19 PM

## 2019-06-29 NOTE — PLAN OF CARE
"Heart Failure Care Pathway  GOALS TO BE MET BEFORE DISCHARGE:    1. Decrease congestion and/or edema with diuretic therapy to achieve near      optimal volume status.            Dyspnea improved:  Yes            Edema improved:     No, please explain: 2-3+ edema bilat LE lower legs, ankles,feet        Net I/O and Weights since admission:          No intake/output data recorded.            Vitals:    06/28/19 2055 06/28/19 2345   Weight: 60.8 kg (134 lb) 60.9 kg (134 lb 3.2 oz)         2.  O2 sats > 92% on RA or at prior home O2 therapy level.          Current oxygenation status:       SpO2: 98 %         O2 Device: Oxymask,  Oxygen Delivery: 4 LPM         Able to wean O2 this shift to keep sats > 92%:  No, please explain: patient needing 4L Oxymask while asleep to maintain O2 sats greater than 90%        Does patient use Home O2? No    3.  Tolerates ambulation and mobility near baseline: No, please explain: Patient reports \"so weak\" and daughter tells RN \"patient is so weak\"         How many times did the patient ambulate with nursing staff this shift? 0    Please review the Heart Failure Care Pathway for additional HF goal outcomes.    Mone Martínez RN  6/29/2019      "

## 2019-06-29 NOTE — ED TRIAGE NOTES
Pt. states having intermittant CP and SOB since lastnight. Concurrent increased confusion. History of A-fib.

## 2019-06-29 NOTE — ED PROVIDER NOTES
History     Chief Complaint:  Chest pain and shortness of breath     HPI   Amira Arreola is a 86 year old female with a history of CHF and multiple myeloma who presents to the ED for the evaluation of chest pain and shortness of breath. The patient's daughter reports that the patient has been having fatigue, weakness, shortness of breath, and chest pain as of recent. The patient's daughter reports that the patient is currently undergoing CHF treatment and treatment for multiple myeloma with chemotherapy every other week for a couple of years. The patient has recently taken a break of about 3 weeks from chemotherapy. The patient was here a couple of years ago for similar symptoms and they changed her dosage of medications at that time.    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    Review of Systems   Constitutional: Positive for fever.   Respiratory: Positive for shortness of breath.    Cardiovascular: Positive for chest pain.   Neurological: Positive for weakness.   10 point review of systems performed and is negative except as above and in HPI.      Physical Exam     Patient Vitals for the past 24 hrs:   BP Temp Temp src Pulse Heart Rate Resp SpO2 Height Weight   06/28/19 2315 117/73 -- -- 97 99 21 95 % -- --   06/28/19 2258 110/72 -- -- -- 78 20 95 % -- --   06/28/19 2245 115/83 -- -- 100 -- -- -- -- --   06/28/19 2230 96/61 -- -- 80 78 19 94 % -- --   06/28/19 2225 93/59 -- -- 78 78 22 94 % -- --   06/28/19 2220 96/61 -- -- 78 78 19 94 % -- --   06/28/19 2215 94/60 --  "-- 78 78 19 94 % -- --   06/28/19 2210 93/60 -- -- 78 78 19 94 % -- --   06/28/19 2205 91/59 -- -- 78 78 20 94 % -- --   06/28/19 2200 94/59 -- -- 79 79 19 94 % -- --   06/28/19 2155 92/66 -- -- 50 79 17 94 % -- --   06/28/19 2150 103/67 -- -- 79 79 16 93 % -- --   06/28/19 2146 -- -- -- -- 79 21 94 % -- --   06/28/19 2145 96/62 -- -- 79 79 19 93 % -- --   06/28/19 2140 91/67 -- -- 70 79 22 93 % -- --   06/28/19 2137 100/61 -- -- 79 99 21 94 % -- --   06/28/19 2136 93/60 -- -- 105 105 21 94 % -- --   06/28/19 2134 109/65 -- -- 106 106 21 95 % -- --   06/28/19 2131 105/63 -- -- 106 106 22 96 % -- --   06/28/19 2130 98/77 -- -- 105 99 17 95 % -- --   06/28/19 2126 115/83 -- -- 99 99 19 95 % -- --   06/28/19 2117 -- -- -- -- 106 16 97 % -- --   06/28/19 2116 111/85 -- -- 125 126 10 97 % -- --   06/28/19 2055 (!) 138/103 99.9  F (37.7  C) Temporal -- 161 20 97 % 1.6 m (5' 3\") 60.8 kg (134 lb)       Physical Exam  General: Resting on the gurney  Head:  The scalp, face, and head appear normal  Mouth/Throat: Mucus membranes are slightly dry  CV:  Normal S1 and S2  No pathological murmur     Heart rate rapid with irregularly irregular rate.  Resp:  Breath sounds clear and equal bilaterally    Non-labored, no retractions or accessory muscle use    No coarseness    No wheezing     Lungs are auscultational.  GI:  Abdomen is soft, no rigidity    No tenderness to palpation  MS:  Normal motor assessment of all extremities.    Good capillary refill noted.    Bilateral lower extremities with pitting edema.   Skin:  No rash or lesions noted.  Neuro:   Speech is normal and fluent. No apparent deficit.  Psych: Awake. Alert.  Normal affect.      Appropriate interactions.    Emergency Department Course     ECG:  ECG taken at 2108  A fib with RVR  Rate 134 bpm. MT interval * ms. QRS duration 84 ms. QT/QTc 278/415 ms. P-R-T axes * -2 -37.    Imaging:  Radiology findings were communicated with the patient who voiced understanding of the " findings.    XR Chest 2 Views  Bilateral pleural effusions and bibasilar infiltrates,  likely atelectasis.  ASCENCION EVANGELISTA MD  Reading per radiology    Laboratory:  Laboratory findings were communicated with the patient who voiced understanding of the findings.    CBC: WBC 7.2, HGB 12.9,   CMP: glucose 140 (H), albumin 3.1 (L), protein 6.2 (L), ALT 79 (H), AST 48 (H), o/w WNL (Creatinine 0.70)  Troponin (Collected 2114): <0.015  INR: 3.89 (H)  Nt probnp inpatient: 00957 (H)    Interventions:  2127 Cardizem 10 mg IV    Emergency Department Course:    2108 Nursing notes and vitals reviewed.    2108 EKG obtained as noted above.    2112 I performed an exam of the patient as documented above.     2114 IV was inserted and blood was drawn for laboratory testing, results above.    2219 I spoke with Dr. Gu of the hospitalist service from  regarding patient's presentation, findings, and plan of care.    2234 The patient was sent for a x-ray while in the emergency department, results above.     2326 Prior to admission, I personally reviewed the imaging and laboratory results with the patient and answered all related questions . Patient admitted.     Impression & Plan      Medical Decision Making:  Amira Arreola is a 86 year old female who presents for evaluation of chest pain and shortness of breath.  This is consistent with atrial fibrillation with rapid ventricular response.  Based on chronicity of symptoms , elected to attempt to control rate instead of rhythm control with diltiazem.  This was successful in controlling rate.  Patient with normal blood pressure and does not appear hemodynamically compromised.  Lab evaluation was unremarkable, no evidence of acute anemia or infectious process.  PE considered but unlikely based on a history of multiple similar episodes in the past.  I doubt acute coronary syndrome, thyroid issues, PE, dissection, drug ingestion, acute electrolyte imbalance, etc.  Will admit to  telemetry for further cares.      Diagnosis:    ICD-10-CM    1. Atrial fibrillation with RVR (H) I48.91 Nt probnp inpatient     Nt probnp inpatient     CANCELED: NT proBNP inpatient and ED     Disposition:   The patient is admitted into the care of Dr. Gu.    Scribe Disclosure:  Romaine WINCHESTER, am serving as a scribe at 9:27 PM on 6/28/2019 to document services personally performed by Eva Pyle MD based on my observations and the provider's statements to me.   EMERGENCY DEPARTMENT       Eva Pyle MD  06/29/19 [id number]

## 2019-06-29 NOTE — H&P
[facility]    History and Physical - Hospitalist Service       Date of Admission:  6/28/2019    Assessment & Plan   Amira Arreola is a 86 year old female with history of heart failure, chronic atrial fibrillation anticoagulated on warfarin, thrombocytopenia, multiple myeloma, pulmonary hypertension, SVT, multiple other comorbidities and recent cognitive decline who comes to the ED from home with her daughter with complaints of fatigue and weakness.  She was found to be in RVR upon arrival to the ED.  She has received diltiazem bolus with normalization of heart rates.  Further discussion with the patient was quite somnolent but arousable and in with her daughter who is present, seems patient does not complain of much in general and most of the issues are related per patient's son and spouse whom she lives with.  In the ED she denies any chest pain, shortness of breath or palpitations.    Fatigue, weakness  Ongoing progressive cognitive decline  This is as observed by her family this patient denies any complaints.  No reported chest pain shortness of breath or palpitations although I do see that as documented in other notes.  Patient's daughter relays that these complaints were not  reported per family that lives with the patient.  No fevers chills or other complaints indicative of infection are reported.  Additionally she is afebrile and has no leukocytosis currently.  Metabolic panel is also fairly unremarkable.  Likely this is secondary to atrial fibrillation with RVR and chronic cognitive changes with advanced age and underlying multiple comorbidities.  - would consider further investigation (I.e. Broadened infectious work up) if she changes clinically; the progressive decline of her baseline and lack of fever, leukocytosis, etc make me think her a fib with rvr was likely the acute tipping factor on her chronically worsening plan  - PT/OT to evaluate tomorrow    Atrial fibrillation with  "RVR  Chronic A fib on warfarin  Supratherapeutic INR 3.89  Patient's family reported that they took her pulse which was \"very fast\".  Given her other symptoms she was brought to the ED.  Recent changes in diltiazem regimen-this was decreased a few days ago reportedly from 180 mg daily to 120 due to hypotension.  Initially rates up to 160s, improved with 10 mg IV diltiazem bolus in ED  - Chadsvasc 5 (HTN, HF, female, age)  - PTA regimen includes diltiazem  once daily and metoprolol succinate  bid mg daily  - We will plan on continuing home regimen and consider increasing dose depending on her heart rates trend tonight  - INR 3.9 as above, clinically she is trying to send to bleeding nor she anemic-we will continue to monitor.  Will consult pharmacy to dose.    Acute on chronic diastolic heart failure  Bilateral pleural effusions, likely basilar atelectasis  Severe pulmonary hypertension  Mod mitral regurg, mod tricuspid regurg  Follows with [facility], but she has apparently been declining lasix numerous days and has therefore had some increased edema, per her family. Clinically she is fluid up today with pleural effusion, periph edema in legs, weight today is 134 pounds which is the same as it was 2 days ago in cardiology clinic.  This is up from her prior visits of 1 25-1 28.  At that time it appears that 40 mg daily Lasix was continued and that compliance was main problem.  She reports that she did take her Lasix earlier today.  She apparently has difficulty with frequent urination after taking the Lasix and I think this factors into her noncompliance.  Troponin I negative. BNP added on.   - TTE 3/23 with EF 55-60% without WMA, 2+ MR, 2+TR, severe pulmonary hypertension and dilated IVC  - PTA regimen includes dilt er 120, metop succ 150 bid, lasix 40 daily  - Consult Cardiology  - Monitor ins and outs and daily weights  - PTA diltiazem and metoprolol to continue, if blood pressure increases we will " plan on IV diuresis but for now we will schedule usual dose of p.o. 40 mg tomorrow morning.     Multiple myeloma with metastases to bone  - Follows with Dr Roberts with [facility]; previously on chemo which has been held for some time  - stable upon admission  - in consideration of her heart failure and other medical problems, palliative consult in clinic had been planned for this coming Tuesday  - will consult palliative care to expedite the process - pending her clinical course this consult may be cancelled if she is expected to go home in time to attend prior clinic appt with palliative    Goals of Care  - Recent core clinic appointment with Dr. Means yielded a plan for palliative medicine consultation next Tuesday.  Given the overall situation and the fact that she may still be admitted I will go ahead and consult palliative medicine.  Healthcare directive was completed previously and believe truly was made POA who is an ophthalmologist living in [place].  At the time of admission, patient's daughter Venice plans on discussing these issues with the patient and their family today (Saturday).      History of hypokalemia  - normal on admission  - PTA Kcl supp continued    Glaucoma  - Stable  - PTA regimen of eye drops to continue     Shingles  - I am not aware of any open sores  - PTA acyclovir bid can be continued    Chronic pain d/t history of thoracic compression fracture  - stable  - PTA pain regimen continued as long as she is not too somnolent    Diet: heart healthy can likely be continued once she is awake and safe to assess for dysphagia screening  DVT Prophylaxis: on warfarin  Russo Catheter: not present  Code Status: Full code for now, confirmed with daughter and patient in ED. Also discussed upcoming palliative consultation that was planned for Tuesday given her advanced age, multiple comorbidities, and ongoing cognitive decline.    Disposition Plan   Expected discharge: 2+ days pending clinical  course, cardiology evaluation, and possible goals of care discussion.   Entered: Jayson Gu MD 06/28/2019, 11:08 PM     The patient's care was discussed with the Patient, Patient's Family and ER MD.    Jayson Gu MD  [facility]    ______________________________________________________________________    Chief Complaint   Fatigue and weakness    History is obtained from the patient's daughter and the patient    History of Present Illness   Amira Arreola is a 86 year old female with history of heart failure, chronic atrial fibrillation anticoagulated on warfarin, thrombocytopenia, multiple myeloma, pulmonary hypertension, SVT, multiple other comorbidities and recent cognitive decline who comes to the ED from home with her daughter with complaints of fatigue and weakness.  Much of this history is provided by Venice patient's daughter as she is quite somnolent at this point.  Patient is arousable and can converse briefly and denies any complaints.  Dunia tells me that Amira lives at home with her  and her son and has home health aides.  Venice was called by her father who relayed that Amira was quite lethargic, weak and fatigued.  I am not being told that patient never relate any chest pain or any real symptoms but she does reportedly minimize things.  There was concern that she might have had some shortness of breath as well.  The symptoms were noted today and therefore they brought her to the ER.  Upon arrival to the ER she had an heart rates in the 140s to 160s and this was noted to be atrial fibrillation.  Blood pressures were variable but were stabilized and are now in the 1 teens systolically.  She is saturating normally on room air.  She did receive diltiazem bolus with subsequent normalization of heart rate.  Seems she was recently seen in the core clinic few days ago.  At that point her diltiazem had been decreased.  Prior to that visit her diltiazem was titrated up  for reportedly borderline higher heart rates.  As aforementioned patient is quite somnolent at this point likely at least in part because it is 11 PM.  She does arouse and answer simple questions and follow simple commands.  She denies any shortness of breath, fevers, chills, nausea, vomiting, abdominal pain, diarrhea, any chest pain, or palpitations.    In further discussion it seems that Amira has been declining cognitively especially of the last several months.  She had been previously receiving chemotherapy for multiple myeloma but this was stopped a few months ago-especially in light of ongoing heart failure and chronic atrial fibrillation management.  Her daughter reports that she gets confused and has used a cell phone for the TV remote and has other short-term memory issues at times.  Some sundowning also noted.  No focal deficits reported and no hallucinations.  Additionally patient has been refusing her Lasix doses increasingly over the last couple months.  As aforementioned she has been following with the core clinic and given her heart failure Venice says that a palliative consultation was planned for this coming Tuesday.  We discussed this in detail and overall patient and family have decided that she is a full code at this point -further discussion to be had in the coming days.  Amira's other daughter is an ophthalmologist lives in [place] and is essentially the primary healthcare person for her.    In the ED she is afebrile currently hemodynamically stable although systolics were in the 90s briefly.  She saturating normally on room air.  Heart rates initially up to 160 currently 70s and 80s.  Lab work yielded fairly unremarkable BMP with blood sugar 140.  Albumin 3.1 transaminases mildly elevated at 79 ALT 44 AST respectively.  Troponin I less than 0.015.  WBC 7.2 Hgb 12.9 platelets 199.  INR 3.89.  Chest x-ray notable for by basilar pleural effusions and some infiltrates felt to be likely  atelectasis.  EKG shows irregularly irregular rhythm with a rapid ventricular response initially.  I discussed the case with Dr. Pyle.  Patient will be admitted to the [facility] for continued management of atrial fibrillation with rapid ventricular response as well as likely acute heart failure exacerbation she does appear fluid overloaded.     Review of Systems    The 10 point Review of Systems is negative other than noted in the HPI or here.     Past Medical History    I have reviewed this patient's medical history and updated it with pertinent information if needed.   Past Medical History:   Diagnosis Date     Abnormal CXR 2018    then ct done and not significant     Acute diastolic heart failure (H) 03/22/2019    nl ef, 2+mr and tr with severe pulm htn     Ascending aorta dilatation (H) 04/2016    on echo, mild, fu 7/18 4.0, slightly larger     Cancer, metastatic to bone (H)     due to myeloma     Colonic polyp 2008    adenomatous, fu 2013 tics only     Compression fracture 2016    multiple areas of spine     Dry eyes      Elevated MCV 2015    b12 and folic acid nl     HTN (hypertension) 2000    off meds for years     Lung nodule 08/2018    on ct, 4mm, ct done for fu abnl cxr     Menorrhagia 2002    hysteroscopy and d and c done     MGUS (monoclonal gammopathy of unknown significance) 2015    eval by Dr. Roberts     Multiple myeloma (H) 2016    dx 5/16 at [place], bone lesions seen on mri 6/16     OAB (overactive bladder) 2013    Dr. Grullon     Osteoporosis     fu done 2010 and stable, went off meds then, fu done 2013; has had gyn fu and added evista 2013 by gyn     Palpitations 4/16    nl echo, mildly dilated asc aorta     Paroxysmal atrial fibrillation (H) 4/16    had palp and ziopatch showed it, echo nl lv fxn, mild mr and tr, added coum and toprol, toprol dose raised 12/22/16     Pulmonary hypertension (H) 03/2019    seen on echo     Sciatica of left side 12/13    Dr. Helen Ricardo 2004     SVT  (supraventricular tachycardia) (H)     on ziopatch     Thrombocytopenia (H)        Past Surgical History   I have reviewed this patient's surgical history and updated it with pertinent information if needed.  Past Surgical History:   Procedure Laterality Date     BONE MARROW BIOPSY, BONE SPECIMEN, NEEDLE/TROCAR N/A 2016    Procedure: BIOPSY BONE MARROW;  Surgeon: Bryan Patel MD;  Location:  GI     CATARACT IOL, RT/LT        SECTION  1965, 1966     COLONOSCOPY  2013    Procedure: COLONOSCOPY;  COLONOSCOPY;  Surgeon: Steffany Rockwell MD;  Location:  GI     EXCISE EXOSTOSIS TIBIA / FIBULA  2014    Procedure: EXCISE EXOSTOSIS TIBIA / FIBULA;  Surgeon: Naila Pichardo MD;  Location:  SD     hysteroscopy and d and c      due to bleeding     left anle replacement       right ankle surgery         Social History   I have reviewed this patient's social history and updated it with pertinent information if needed.  Social History     Tobacco Use     Smoking status: Never Smoker     Smokeless tobacco: Never Used   Substance Use Topics     Alcohol use: No     Alcohol/week: 0.0 oz     Drug use: No       Family History   I have reviewed this patient's family history and updated it with pertinent information if needed.   Family History   Problem Relation Age of Onset     Heart Disease Father      C.A.D. Mother      Cerebrovascular Disease Brother      Family History Negative Sister      Family History Negative Sister      Family History Negative Brother        Prior to Admission Medications   Prior to Admission Medications   Prescriptions Last Dose Informant Patient Reported? Taking?   Carboxymethylcellulose Sod PF (REFRESH PLUS) 0.5 % SOLN ophthalmic solution  at PRN Care Giver Yes Yes   Si drop 4 times daily as needed for dry eyes   Multiple Vitamins-Minerals (DAILY MULTIVITAMIN) CAPS 2019 at AM Care Giver Yes Yes   Sig: Take 1 tablet by mouth  daily    SIMBRINZA 1-0.2 % ophthalmic suspension 6/28/2019 at PM Care Giver Yes Yes   Sig: Place 1 drop into the right eye 2 times daily 1 drop AM and PM   Sodium Fluoride (SF 5000 PLUS) 1.1 % CREA 6/28/2019 at PM Care Giver Yes Yes   Sig: Apply to affected area 3 times daily   acetaminophen (TYLENOL) 500 MG tablet  at PRN Care Giver Yes Yes   Sig: Take 500 mg by mouth every 6 hours as needed for mild pain    acyclovir (ZOVIRAX) 400 MG tablet 6/28/2019 at PM Care Giver No Yes   Sig: Take 1 tablet (400 mg) by mouth 2 times daily   dexamethasone (DECADRON) 4 MG tablet 6/26/2019 at AM Care Giver No Yes   Sig: Take 20mg (5 tablets) by mouth every week.   diltiazem ER (DILT-XR) 120 MG 24 hr capsule 6/28/2019 at AM Care Giver No Yes   Sig: Take 1 capsule (120 mg) by mouth daily   diphenhydrAMINE-acetaminophen (TYLENOL PM)  MG tablet 6/27/2019 at PM Care Giver Yes Yes   Sig: Take 2 tablets by mouth At Bedtime   furosemide (LASIX) 40 MG tablet 6/28/2019 at AM Care Giver No Yes   Sig: Take 1 tablet (40 mg) by mouth daily   latanoprost (XALATAN) 0.005 % ophthalmic solution 6/28/2019 at PM Care Giver Yes Yes   Sig: Place 1 drop into the right eye At Bedtime   lidocaine-prilocaine (EMLA) cream  at PRN Care Giver No Yes   Sig: Apply to port site 1 hour prior to access   metoprolol succinate ER (TOPROL-XL) 50 MG 24 hr tablet 6/28/2019 at PM Care Giver No Yes   Sig: Take 3 tablets (150 mg) by mouth 2 times daily   oxyCODONE (ROXICODONE) 5 MG tablet  at PRN Care Giver No Yes   Sig: Take 1 tablet (5 mg) by mouth every 4 hours as needed for pain maximum 4 tablet(s) per day   polyethylene glycol (MIRALAX/GLYCOLAX) powder  at PRN Care Giver Yes Yes   Sig: Take 17 g by mouth daily as needed for constipation    potassium chloride (KLOR-CON) 20 MEQ packet 6/28/2019 at PM Care Giver No Yes   Sig: Take 20 mEq by mouth 2 times daily   prochlorperazine (COMPAZINE) 10 MG tablet  at PRN Care Giver No Yes   Sig: Take 1 tablet (10 mg) by  mouth every 6 hours as needed for nausea or vomiting   vitamin D3 (VITAMIN D3) 1000 units (25 mcg) tablet 6/28/2019 at AM Care Giver Yes Yes   Sig: Take 1,000 Units by mouth daily   warfarin (COUMADIN) 4 MG tablet 6/28/2019 at PM - 2 mg Care Giver Yes Yes   Sig: Take one tablet (4 mg) by mouth on Tuesdays and Saturdays; take one-half tablet ( 2 mg) on all other days of the week.      Facility-Administered Medications: None     Allergies   Allergies   Allergen Reactions     Blood Transfusion Related (Informational Only)      Blood antigens related to receiving Darzelex-AG     Penicillin [Penicillins] Rash     Blotches on chest        Physical Exam   Vital Signs: Temp: 99.9  F (37.7  C) Temp src: Temporal BP: 115/83 Pulse: 100 Heart Rate: 78 Resp: 22 SpO2: 94 %      Weight: 134 lbs 0 oz      Gen: NAD, pleasant, somnolent, elderly  HEENT: Normocephalic, EOMI, MMM  Resp: bibasilar crackles,  no wheezes, no increased work of resp  CV: S1S2 heard, irreg irreg rhythm, reg rate, bilateral pitting pedal edema  Abdo: soft, nontender, nondistended, bowel sounds present  Ext: calves nontender, well perfused  Neuro: drowsy but rouses to verbal cue, oriented to person, CN grossly intact, no facial asymmetry, moving all 4 ext at will      Data   Data reviewed today: I reviewed all medications, new labs and imaging results over the last 24 hours. I personally reviewed no images or EKG's today.    Recent Labs   Lab 06/28/19  2114 06/26/19  1104 06/25/19   WBC 7.2  --   --    HGB 12.9  --   --    MCV 96  --   --      --   --    INR 3.89*  --  3.2*    134*  --    POTASSIUM 3.6 4.2  --    CHLORIDE 104 105  --    CO2 28 23  --    BUN 22 16  --    CR 0.70 0.82  --    ANIONGAP 6 10.2  --    BRADLEY 9.0 9.5  --    * 156*  --    ALBUMIN 3.1*  --   --    PROTTOTAL 6.2*  --   --    BILITOTAL 0.5  --   --    ALKPHOS 62  --   --    ALT 79*  --   --    AST 48*  --   --    TROPI <0.015  --   --      Recent Results (from the past  24 hour(s))   XR Chest 2 Views    Narrative    CHEST TWO VIEWS    6/28/2019 10:54 PM     HISTORY: Congestive heart failure. Tachycardia.    COMPARISON: 3/22/2019.    FINDINGS: Right chest wall port. No pneumothorax. The heart is  enlarged. There is no pulmonary edema. There are bilateral pleural  effusions and bibasilar probable atelectasis. Degenerative disease and  compression fractures in the thoracic spine as seen previously.      Impression    IMPRESSION: Bilateral pleural effusions and bibasilar infiltrates,  likely atelectasis.    ASCENCION EVANGELISTA MD

## 2019-06-29 NOTE — PROGRESS NOTES
SPIRITUAL HEALTH SERVICES Progress Note  [facility]  Visited pt per request. Pt was unable to communicate spiritual needs at this time. SH introduced self to pt and SH services.     No follow-up is necessary. SH remains available by request or referral.    Bob Cotto   Intern  Pager:  [phone]

## 2019-06-29 NOTE — PLAN OF CARE
PT:  Attempted to see patient for PT evaluation this AM. Patient soundly asleep upon arrival of therapist, does not initially open eyes to therapist voice but then does with light touch to arm. Patient declines working with therapy this AM, upon attempt to educate patient falling back asleep and asleep prior to therapist exiting room.

## 2019-06-30 ENCOUNTER — APPOINTMENT (OUTPATIENT)
Dept: OCCUPATIONAL THERAPY | Facility: CLINIC | Age: 84
DRG: 308 | End: 2019-06-30
Payer: MEDICARE

## 2019-06-30 ENCOUNTER — APPOINTMENT (OUTPATIENT)
Dept: PHYSICAL THERAPY | Facility: CLINIC | Age: 84
DRG: 308 | End: 2019-06-30
Payer: MEDICARE

## 2019-06-30 LAB
INR PPP: 2.24 (ref 0.86–1.14)
TROPONIN I SERPL-MCNC: <0.015 UG/L (ref 0–0.04)

## 2019-06-30 PROCEDURE — 25000128 H RX IP 250 OP 636: Performed by: HOSPITALIST

## 2019-06-30 PROCEDURE — 25000125 ZZHC RX 250: Performed by: INTERNAL MEDICINE

## 2019-06-30 PROCEDURE — 97535 SELF CARE MNGMENT TRAINING: CPT | Mod: GO

## 2019-06-30 PROCEDURE — 25000132 ZZH RX MED GY IP 250 OP 250 PS 637: Mod: GY | Performed by: INTERNAL MEDICINE

## 2019-06-30 PROCEDURE — 97165 OT EVAL LOW COMPLEX 30 MIN: CPT | Mod: GO

## 2019-06-30 PROCEDURE — 97161 PT EVAL LOW COMPLEX 20 MIN: CPT | Mod: GP | Performed by: PHYSICAL THERAPIST

## 2019-06-30 PROCEDURE — 21000001 ZZH R&B HEART CARE

## 2019-06-30 PROCEDURE — 99232 SBSQ HOSP IP/OBS MODERATE 35: CPT | Performed by: INTERNAL MEDICINE

## 2019-06-30 PROCEDURE — 99232 SBSQ HOSP IP/OBS MODERATE 35: CPT | Performed by: HOSPITALIST

## 2019-06-30 PROCEDURE — 84484 ASSAY OF TROPONIN QUANT: CPT | Performed by: HOSPITALIST

## 2019-06-30 PROCEDURE — 97530 THERAPEUTIC ACTIVITIES: CPT | Mod: GP | Performed by: PHYSICAL THERAPIST

## 2019-06-30 PROCEDURE — 25000132 ZZH RX MED GY IP 250 OP 250 PS 637: Mod: GY

## 2019-06-30 PROCEDURE — 25000132 ZZH RX MED GY IP 250 OP 250 PS 637: Mod: GY | Performed by: HOSPITALIST

## 2019-06-30 PROCEDURE — 85610 PROTHROMBIN TIME: CPT | Performed by: HOSPITALIST

## 2019-06-30 PROCEDURE — 97116 GAIT TRAINING THERAPY: CPT | Mod: GP | Performed by: PHYSICAL THERAPIST

## 2019-06-30 RX ORDER — DILTIAZEM HYDROCHLORIDE 180 MG/1
180 CAPSULE, EXTENDED RELEASE ORAL DAILY
Status: DISCONTINUED | OUTPATIENT
Start: 2019-06-30 | End: 2019-07-01 | Stop reason: HOSPADM

## 2019-06-30 RX ORDER — METOPROLOL TARTRATE 1 MG/ML
5 INJECTION, SOLUTION INTRAVENOUS EVERY 4 HOURS PRN
Status: DISCONTINUED | OUTPATIENT
Start: 2019-06-30 | End: 2019-07-01 | Stop reason: HOSPADM

## 2019-06-30 RX ORDER — WARFARIN SODIUM 4 MG/1
4 TABLET ORAL
Status: COMPLETED | OUTPATIENT
Start: 2019-06-30 | End: 2019-06-30

## 2019-06-30 RX ADMIN — METOPROLOL SUCCINATE 150 MG: 100 TABLET, EXTENDED RELEASE ORAL at 21:15

## 2019-06-30 RX ADMIN — METOPROLOL SUCCINATE 150 MG: 100 TABLET, EXTENDED RELEASE ORAL at 02:42

## 2019-06-30 RX ADMIN — LATANOPROST 1 DROP: 50 SOLUTION/ DROPS OPHTHALMIC at 21:19

## 2019-06-30 RX ADMIN — POTASSIUM CHLORIDE 20 MEQ: 1.5 POWDER, FOR SOLUTION ORAL at 21:15

## 2019-06-30 RX ADMIN — ACYCLOVIR 400 MG: 400 TABLET ORAL at 09:03

## 2019-06-30 RX ADMIN — POTASSIUM CHLORIDE 20 MEQ: 1.5 POWDER, FOR SOLUTION ORAL at 09:04

## 2019-06-30 RX ADMIN — METOPROLOL TARTRATE 5 MG: 5 INJECTION INTRAVENOUS at 01:10

## 2019-06-30 RX ADMIN — SODIUM CHLORIDE, PRESERVATIVE FREE 5 ML: 5 INJECTION INTRAVENOUS at 01:19

## 2019-06-30 RX ADMIN — ACYCLOVIR 400 MG: 400 TABLET ORAL at 17:43

## 2019-06-30 RX ADMIN — FUROSEMIDE 40 MG: 40 TABLET ORAL at 09:04

## 2019-06-30 RX ADMIN — SENNOSIDES AND DOCUSATE SODIUM 1 TABLET: 8.6; 5 TABLET ORAL at 21:15

## 2019-06-30 RX ADMIN — DILTIAZEM HYDROCHLORIDE 180 MG: 180 CAPSULE, EXTENDED RELEASE ORAL at 09:03

## 2019-06-30 RX ADMIN — WARFARIN SODIUM 4 MG: 4 TABLET ORAL at 17:43

## 2019-06-30 RX ADMIN — SODIUM CHLORIDE, PRESERVATIVE FREE 5 ML: 5 INJECTION INTRAVENOUS at 06:15

## 2019-06-30 ASSESSMENT — ACTIVITIES OF DAILY LIVING (ADL)
ADLS_ACUITY_SCORE: 23
ADLS_ACUITY_SCORE: 26
ADLS_ACUITY_SCORE: 27
ADLS_ACUITY_SCORE: 23
ADLS_ACUITY_SCORE: 27
ADLS_ACUITY_SCORE: 21

## 2019-06-30 ASSESSMENT — MIFFLIN-ST. JEOR: SCORE: 985.07

## 2019-06-30 NOTE — CONSULTS
Care Transition Initial Assessment - SW     Met with:    Active Problems:    Atrial fibrillation with rapid ventricular response (H)       DATA  Lives With: child(ezra), adult, spouse   Living Arrangements: house  Quality of Family Relationships: involved, supportive  Description of Support System: Supportive, Involved  Who is your support system?: , Children  Support Assessment: Adequate family and caregiver support.   Identified issues/concerns regarding health management: SW following for discharge needs  Quality of Family Relationships: involved, supportive  Transportation Anticipated: family or friend will provide    ASSESSMENT  Cognitive Status:  Alert and oriented X 3-4  Concerns to be addressed: Patient is a 86 year old female who was admitted to the hospital for a-fib with rapid ventricular response. Prior to hospitalization, patient was living at home with spouse and adult son. Per PT note, patient's son is not working and assist quite a bit at home. Therapy is recommending home with 24 hour assist vs TCU. SW will discuss with patient and determine safe discharge plan.     SW spoke with patient and she feels she can return home. SW spoke with patient's son, Dannie, and daughter, Yesi, and they are going to talk to other siblings and see if they can get a plan in place to safely take patient home. Yesi will contact SW with an update later today.   PLAN  Financial costs for the patient includes   Patient given options and choices for discharge   Patient/family is agreeable to the plan?  YES  Transportation/person available to transport on day of discharge  is TBD and have they been notified/set up TBD  Patient Goals and Preferences: Home with 24 hour Assist vs TCU  Patient anticipates discharging to: Home with 24 hour Assist vs TCU    Libertad Paulino, ADAN   [phone]

## 2019-06-30 NOTE — PLAN OF CARE
Pt has been pain free with V/signs stable and maintaining her O2 sats in mid 90's on Rm air. Diuresing well post Lasix this morning and has waked to the Bathroom  with walker and asssit of 1 but has been incontinent at times. Diltiazem was increased to 180 mg and was given this morning. Remains in A-fib with Heart Rate in the 60-80's. Poss discharge to home tomorrow with Home Health services which Pt had before Admission. Dr Daniel did update Pt's family today.

## 2019-06-30 NOTE — PLAN OF CARE
MD Notification    Notified Person: MD    Notified Person Name: Dr Castillo    Notification Date/Time: 6/30 0056    Notification Interaction: paged    Purpose of Notification: afib RVR sustained    Orders Received: yes    Comments:

## 2019-06-30 NOTE — PLAN OF CARE
MD Notification    Notified Person: MD    Notified Person Name: Dr Castillo    Notification Date/Time: 6/30/2019 0230    Notification Interaction: paged    Purpose of Notification: continued RVR after IV Metoprolol. Asymptomatic, VSS    Orders Received: yes    Comments:

## 2019-06-30 NOTE — PLAN OF CARE
Alert and oriented x 3-4, often confused regarding time. Denies pain. Tele Afib. RVR late evening, early overnight. Asymptomatic and BP was stable. MD notified x 2. IV Metoprolol given without effect so long acting PO Metoprolol ordered and given which was effective. [place] overnight with good output. Bottom with rash that Amira states she has had and applies barrier cream, of which was applied. L shin with scab that is red. Edema bilateral LE. Port flushed with saline/heparin after morning PCU collect.

## 2019-06-30 NOTE — PLAN OF CARE
Discharge Planner PT   Patient plan for discharge: home soon  Current status: PT eval completed, treatment initiated. Pt is an 85 y/o female admitted with fatigue and weakness, found to be in A fib with RVR. Pt is mildly confused but cooperative, reports that she lives in a 1 level home with spouse and son who isn't currently working, 3 steps to enter. Son does most of household chores, she ambulates with a walker and modified IND at home. Currently, pt transfers with SBA/CGA, ambulates 100' with wh walker and SBA. If family can provide 24/7 stand by assist with mobility, agree with plan for home. Noted pt is to have palliative consult, pt has multiple myeloma with bone metastases.  Barriers to return to prior living situation: 3 steps to enter, currently requires SBA/CGA for all mobility.  Recommendations for discharge: home with home PT and 24/7 assist from family, if they cannot provide this she may need TCU.  Rationale for recommendations: Pt currently requires more assist for mobility than at baseline. Will benefit from cont PT either at home or at TCU to address mobility and strength deficits.       Entered by: Nicky Arce 06/30/2019 10:21 AM

## 2019-06-30 NOTE — PROGRESS NOTES
"   06/30/19 1004   Quick Adds   Type of Visit Initial PT Evaluation   Living Environment   Lives With spouse;child(ezra), adult   Living Arrangements house   Home Accessibility stairs to enter home   Number of Stairs, Main Entrance 3   Stair Railings, Main Entrance railings safe and in good condition   Transportation Anticipated family or friend will provide   Living Environment Comment Per pt, she lives with son and spouse. Son does not work outside the home, cares for pt and spouse   Self-Care   Usual Activity Tolerance moderate   Current Activity Tolerance fair   Regular Exercise No   Equipment Currently Used at Home walker, rolling   Functional Level Prior   Ambulation 1-->assistive equipment   Transferring 1-->assistive equipment   Toileting 1-->assistive equipment   Bathing 3-->assistive equipment and person   Communication 0-->understands/communicates without difficulty   Swallowing 0-->swallows foods/liquids without difficulty   Cognition 1 - attention or memory deficits   Fall history within last six months no   General Information   Onset of Illness/Injury or Date of Surgery - Date 06/28/19   Referring Physician Dr Jayson Gu   Patient/Family Goals Statement pt hopes to go home tomorrow   Pertinent History of Current Problem (include personal factors and/or comorbidities that impact the POC) Pt was admitted 6/28/19 with fatigue and weakness, family has noted cognitive decline recently. PMH includes multiple myeloma with bone mets, CHF, chronic A fib, pulmonary HTN. Pt is having palliative consult soon.   Precautions/Limitations fall precautions  (bone metastases)   Weight-Bearing Status - LUE full weight-bearing   Weight-Bearing Status - RUE full weight-bearing   Weight-Bearing Status - LLE full weight-bearing   Weight-Bearing Status - RLE full weight-bearing   Cognitive Status Examination   Orientation person;place  (off on day of week and month by one, stated year as \"19\")   Level of Consciousness " "alert;confused   Follows Commands and Answers Questions 75% of the time;able to follow single-step instructions   Personal Safety and Judgment intact   Memory impaired   Pain Assessment   Patient Currently in Pain No   Integumentary/Edema   Integumentary/Edema Comments pt has port    Posture    Posture Not impaired   Range of Motion (ROM)   ROM Comment WFL LE's   Strength   Strength Comments LE strength grossly 4/5 and symmetrical   Bed Mobility   Bed Mobility Comments SBA supine to sit    Transfer Skills   Transfer Comments sit to/from stand with cues for hand placement and CGA   Gait   Gait Comments gait 20' with wh walker and SBA. Pre-PT eval /70, HR 84, O2 sats on RA 95%, post gt /82, HR 99, sats on RA 98.   Balance   Balance Comments mildly unsteady with gait, walker helps.   Sensory Examination   Sensory Perception no deficits were identified   Coordination   Coordination no deficits were identified   Muscle Tone   Muscle Tone no deficits were identified   General Therapy Interventions   Planned Therapy Interventions gait training;strengthening;transfer training;balance training   Clinical Impression   Criteria for Skilled Therapeutic Intervention yes, treatment indicated   PT Diagnosis impaired functional mobility   Influenced by the following impairments generalized deconditioning/weakness, decreased balance   Functional limitations due to impairments fall risk, decreased IND with mobility   Clinical Presentation Stable/Uncomplicated   Clinical Presentation Rationale per clinical judgement   Clinical Decision Making (Complexity) Low complexity   Therapy Frequency 5x/week   Predicted Duration of Therapy Intervention (days/wks) 4 days   Anticipated Discharge Disposition Home with Assist;Home with Home Therapy   Risk & Benefits of therapy have been explained Yes   Patient, Family & other staff in agreement with plan of care Yes   [facility] AM-PAC TM \"6 Clicks\"   2016, Trustees of [place] " "[place], under license to [employer].  All rights reserved.   6 Clicks Short Forms Basic Mobility Inpatient Short Form   [facility] AM-PAC  \"6 Clicks\" V.2 Basic Mobility Inpatient Short Form   1. Turning from your back to your side while in a flat bed without using bedrails? 3 - A Little   2. Moving from lying on your back to sitting on the side of a flat bed without using bedrails? 3 - A Little   3. Moving to and from a bed to a chair (including a wheelchair)? 3 - A Little   4. Standing up from a chair using your arms (e.g., wheelchair, or bedside chair)? 3 - A Little   5. To walk in hospital room? 3 - A Little   6. Climbing 3-5 steps with a railing? 3 - A Little   Basic Mobility Raw Score (Score out of 24.Lower scores equate to lower levels of function) 18     "

## 2019-06-30 NOTE — PROGRESS NOTES
[facility]    Medicine Progress Note - Hospitalist Service       Date of Admission:  6/28/2019  Assessment & Plan       Assessment & Plan      Amira Arreola is a 86 year old woman with PMH significant for his heart failure, chronic atrial fibrillation anticoagulated on warfarin, thrombocytopenia, multiple myeloma, pulmonary hypertension, SVT, multiple other comorbidities and recent cognitive decline who comes to the ED from home with her daughter with complaints of fatigue and weakness,and found to have a fibb with RVR         Fatigue, weakness  Ongoing progressive cognitive decline  Likely this is secondary to atrial fibrillation with RVR in setting of Pulm HTN and HFpEF,  and chronic cognitive changes with advanced age and underlying multiple comorbidities.  -- PT/OT following     Atrial fibrillation with RVR  Chronic A fib on warfarin  Supratherapeutic INR 3.89  diltiazem was decreased a few days ago reportedly from 180 mg daily to 120 due to hypotension.  - increased back to 180 on 6/29 by Cardiology.   -She had an episode RVR yesterday evening requiring iv metoprolol X2,    - Chadsvasc 5 (HTN, HF, female, age)  - PTA regimen includes diltiazem  once daily and metoprolol succinate  bid mg daily  - INR 3.9 on admission, therapeutic at 2.24 today, resumed coumaidin .  - Cardiology following, thank you       Acute on chronic diastolic heart failure  Bilateral pleural effusions, likely basilar atelectasis  Severe pulmonary hypertension  Mod mitral regurg, mod tricuspid regurg   Clinically hypervolemic, elevated BNP,  had IV lasix yesterday, resume PTA lasix 40 oral at discharge.    Follows with [facility], but she has apparently been declining lasix numerous days and has therefore had some increased edema, per her family.  - TTE 3/23 with EF 55-60% without WMA, 2+ MR, 2+TR, severe pulmonary hypertension and dilated IVC  - Monitor ins and outs and daily weights  -continue PTA  diltiazem and metoprolol      Multiple myeloma with metastases to bone  - Follows with Dr Roberts with [facility]; previously on chemo which has been held for some time  - stable upon admission  - in consideration of her heart failure and other medical problems, palliative consult in clinic had been planned for this coming Tuesday  - will consult palliative care to expedite the process - pending her clinical course this consult may be cancelled if she is expected to go home in time to attend prior clinic appt with palliative     Goals of Care  - Recent [facility] clinic appointment with Dr. Means yielded a plan for palliative medicine consultation next Tuesday, hence palliative consulted for their input.      History of hypokalemia  - normal on admission  - PTA Kcl supp continued     Glaucoma  - Stable  - PTA regimen of eye drops to continue      Shingles  - I am not aware of any open sores  - PTA acyclovir bid can be continued     Chronic pain d/t history of thoracic compression fracture  - stable  - PTA pain regimen continued as long as she is not too somnolent     Diet: regular diet,   DVT Prophylaxis: On systemic anticogulation  Russo Catheter: not present  Code Status: Full Code      Disposition Plan   Expected discharge may be later today or tomorrow, recommended to prior living arrangement once pendindg palliative care and Cardiology input.and how the HR does with increased diltiazem dose to 180 .    Entered: Kelvin Acuna MD 06/30/2019, 8:18 AM       The patient's care was discussed with the Bedside Nurse and Patient.    Kelvin Acuna MD  Hospitalist Service  [facility]    ______________________________________________________________________    Interval History   Slept well. Feels much better today, alert  Conversant, no new complaints, asking f she could be discharged home today    Data reviewed today: I reviewed all medications, new labs and imaging results over the  last 24 hours. I personally reviewed no images or EKG's today.    Physical Exam   Vital Signs: Temp: 97.8  F (36.6  C) Temp src: Oral BP: 123/69   Heart Rate: 84 Resp: 16 SpO2: 96 % O2 Device: None (Room air)    Weight: 127 lbs 0 oz  General Appearance: Alert in NAD  Respiratory:  CTA no wheezing   Cardiovascular:  irrgular rhythm , S1 S2 heard  GI: Sof NT/ND + BS   Skin: warm dry no rashes      Data   Recent Labs   Lab 06/30/19  0545 06/29/19  1056 06/28/19  2114 06/26/19  1104   WBC  --   --  7.2  --    HGB  --   --  12.9  --    MCV  --   --  96  --    PLT  --   --  199  --    INR 2.24* 2.85* 3.89*  --    NA  --   --  138 134*   POTASSIUM  --   --  3.6 4.2   CHLORIDE  --   --  104 105   CO2  --   --  28 23   BUN  --   --  22 16   CR  --   --  0.70 0.82   ANIONGAP  --   --  6 10.2   BRADLEY  --   --  9.0 9.5   GLC  --   --  140* 156*   ALBUMIN  --   --  3.1*  --    PROTTOTAL  --   --  6.2*  --    BILITOTAL  --   --  0.5  --    ALKPHOS  --   --  62  --    ALT  --   --  79*  --    AST  --   --  48*  --    TROPI <0.015  --  <0.015  --

## 2019-06-30 NOTE — PROGRESS NOTES
[facility]    Cardiology Progress Note     Assessment & Plan   Amira Arreola is a 86 year old female who was admitted on 6/28/2019. I was asked to see the patient for atrial fibrillation and HFPEF.     1. Chronic atrial fibrillation with inadequately controlled ventricular response on chronic anticoagulation with Coumadin  2. Acute on chronic diastolic congestive heart failure  3. Severe pulmonary hypertension  4. Moderate mitral and tricuspid valve regurgitation  5. Metastatic multiple myeloma  6. Chronic pain with history of thoracic compression fracture  7. Goals of care    Overnight required additional IV metoprolol for rate control due to rapid atrial fibrillation.  I am going to increase her diltiazem back to 180 mg and will continue on the metoprolol succinate 150 mg twice a day.  Will follow her heart rates today and determine if her heart rate control is improved on this.    Otherwise, she is actually quite euvolemic on exam today.  We will continue her outpatient oral Lasix regimen.  She herself wants to go back home but like to see her heart rate is under a bit better control before this.  We will continue to reassess her as the day goes on and decide whether or not to dismiss from the hospital today or tomorrow.    Jake Daniel MD    Interval History   Much more awake and conversant today.  No acute complaints.  Feels significantly better.    Physical Exam   Temp: 97.8  F (36.6  C) Temp src: Oral BP: 123/69   Heart Rate: 84 Resp: 16 SpO2: 96 % O2 Device: None (Room air)    Vitals:    06/28/19 2055 06/28/19 2345 06/30/19 0605   Weight: 60.8 kg (134 lb) 60.9 kg (134 lb 3.2 oz) 57.6 kg (127 lb)     Vital Signs with Ranges  Temp:  [97.3  F (36.3  C)-99  F (37.2  C)] 97.8  F (36.6  C)  Heart Rate:  [] 84  Resp:  [16-18] 16  BP: ()/(41-94) 123/69  SpO2:  [94 %-96 %] 96 %  I/O last 3 completed shifts:  In: 463 [P.O.:463]  Out: 600 [Urine:600]    Constitutional: No apparent  distress.   Eyes: No xanthelasma or conjunctivitis  Respiratory: Clear to auscultation bilaterally. No crackles or wheezes.  Cardiovascular: Irregularly-regular rhythm with a tachycardic rate.  Soft systolic murmur heard best at the apex.  Extremities: No peripheral edema.  Neurologic: Moving all extremities. No facial assymmetry.  Psychiatric: Answers questions appropriately.     Medications     - MEDICATION INSTRUCTIONS -       Warfarin Therapy Reminder         acyclovir  400 mg Oral BID     diltiazem ER  180 mg Oral Daily     furosemide  40 mg Oral Daily     heparin  5 mL Intracatheter Q28 Days     heparin lock flush  5-10 mL Intracatheter Q24H     latanoprost  1 drop Right Eye At Bedtime     metoprolol succinate ER  150 mg Oral BID     potassium chloride  20 mEq Oral BID     senna-docusate  1 tablet Oral BID    Or     senna-docusate  2 tablet Oral BID     sodium chloride (PF)  3 mL Intracatheter Q8H       Data   Results for orders placed or performed during the hospital encounter of 06/28/19 (from the past 24 hour(s))   INR   Result Value Ref Range    INR 2.85 (H) 0.86 - 1.14   UA with Microscopic reflex to Culture   Result Value Ref Range    Color Urine Straw     Appearance Urine Clear     Glucose Urine Negative NEG^Negative mg/dL    Bilirubin Urine Negative NEG^Negative    Ketones Urine Negative NEG^Negative mg/dL    Specific Gravity Urine 1.006 1.003 - 1.035    Blood Urine Negative NEG^Negative    pH Urine 5.0 5.0 - 7.0 pH    Protein Albumin Urine Negative NEG^Negative mg/dL    Urobilinogen mg/dL Normal 0.0 - 2.0 mg/dL    Nitrite Urine Negative NEG^Negative    Leukocyte Esterase Urine Negative NEG^Negative    Source Midstream Urine     WBC Urine 1 0 - 5 /HPF    RBC Urine 1 0 - 2 /HPF    Squamous Epithelial /HPF Urine 1 0 - 1 /HPF    Mucous Urine Present (A) NEG^Negative /LPF    Hyaline Casts 7 (H) 0 - 2 /LPF   INR   Result Value Ref Range    INR 2.24 (H) 0.86 - 1.14   Troponin I   Result Value Ref Range     Troponin I ES <0.015 0.000 - 0.045 ug/L

## 2019-06-30 NOTE — PROGRESS NOTES
06/30/19 1130   Quick Adds   Type of Visit Initial Occupational Therapy Evaluation   Living Environment   Lives With child(ezra), adult;other (see comments)   Living Arrangements independent living facility   Home Accessibility stairs to enter home   Number of Stairs, Main Entrance 3   Stair Railings, Main Entrance railings safe and in good condition   Transportation Anticipated family or friend will provide   Living Environment Comment Per pt, she lives with son and spouse. Son does not work outside the home, cares for pt and spouse.    Self-Care   Usual Activity Tolerance moderate   Current Activity Tolerance fair   Regular Exercise No   Equipment Currently Used at Home walker, rolling   Functional Level   Ambulation 1-->assistive equipment   Transferring 1-->assistive equipment   Toileting 1-->assistive equipment   Bathing 3-->assistive equipment and person   Dressing 0-->independent   Eating 0-->independent   Communication 0-->understands/communicates without difficulty   Swallowing 0-->swallows foods/liquids without difficulty   Cognition 1 - attention or memory deficits   Fall history within last six months no   Prior Functional Level Comment Patient has HHA assist with bathing, laundry, cleaning   General Information   Onset of Illness/Injury or Date of Surgery - Date 06/28/19   Referring Physician Dr. Jayson Gu   Patient/Family Goals Statement return home   Additional Occupational Profile Info/Pertinent History of Current Problem 85yo female admitted with fatigue and weakness, family reports decline in cognition. PMH includes multiple myeloma with bone mets, CHF, chronic Afib. Palliative care consult pending.    Precautions/Limitations fall precautions  ((bone mets))   Cognitive Status Examination   Orientation orientation to person, place and time   Level of Consciousness alert   Follows Commands (Cognition) follows one step commands;75-90% accuracy;over 90% accuracy  (needs directive repeated -  appears hard of hearing)   Cognitive Comment Family reports memory impaired - not formally assessed at time of eval   Visual Perception   Visual Perception No deficits were identified  (wears one contact lens and not here in hospital )   Visual Perception Comments Difficulty reading menu as does not have her contact lens here   Pain Assessment   Patient Currently in Pain No   Mobility   Bed Mobility Comments SBA sup<>sit   Transfer Skill: Sit to Stand   Level of [place]: Sit/Stand stand-by assist   Physical Assist/Nonphysical Assist: Sit/Stand supervision   Assistive Device for Transfer: Sit/Stand rolling walker   Transfer Skill: Toilet Transfer   Level of [place]: Toilet stand-by assist   Physical Assist/Nonphysical Assist: Toilet supervision   Assistive Device rolling walker;grab bars   Balance   Balance Comments CGA amb with walker in room, no LOB   Lower Body Dressing   Level of [place]: Dress Lower Body minimum assist (75% patients effort)   Physical Assist/Nonphysical Assist: Dress Lower Body 1 person assist   Toileting   Level of [place]: Toilet stand-by assist   Physical Assist/Nonphysical Assist: Toilet supervision   Eating/Self Feeding   Level of [place]: Eating independent   Physical Assist/Nonphysical Assist: Eating set-up required  (sitting in bed)   Activities of Daily Living Analysis   Impairments Contributing to Impaired Activities of Daily Living strength decreased;cognition impaired   General Therapy Interventions   Planned Therapy Interventions ADL retraining;fine motor coordination training;cognition   Clinical Impression   Criteria for Skilled Therapeutic Interventions Met yes, treatment indicated   OT Diagnosis decreased ADL performance   Influenced by the following impairments generalized weakness   Assessment of Occupational Performance 1-3 Performance Deficits   Identified Performance Deficits functional mobility, toileting, dressing   Clinical Decision Making  "(Complexity) Low complexity   Therapy Frequency Daily   Predicted Duration of Therapy Intervention (days/wks) 3 days   Anticipated Discharge Disposition Home with Assist;Home with Home Therapy   Risks and Benefits of Treatment have been explained. Yes   Patient, Family & other staff in agreement with plan of care Yes   [facility] TM \"6 Clicks\"   2016, Trustees of [facility], under license to [employer].  All rights reserved.   6 Clicks Short Forms Daily Activity Inpatient Short Form   [facility]  \"6 Clicks\" Daily Activity Inpatient Short Form   1. Putting on and taking off regular lower body clothing? 3 - A Little   2. Bathing (including washing, rinsing, drying)? 3 - A Little   3. Toileting, which includes using toilet, bedpan or urinal? 4 - None   4. Putting on and taking off regular upper body clothing? 4 - None   5. Taking care of personal grooming such as brushing teeth? 4 - None   6. Eating meals? 4 - None   Daily Activity Raw Score (Score out of 24.Lower scores equate to lower levels of function) 22   Total Evaluation Time   Total Evaluation Time (Minutes) 15     "

## 2019-07-01 ENCOUNTER — APPOINTMENT (OUTPATIENT)
Dept: GENERAL RADIOLOGY | Facility: CLINIC | Age: 84
DRG: 308 | End: 2019-07-01
Attending: PHYSICIAN ASSISTANT
Payer: MEDICARE

## 2019-07-01 ENCOUNTER — APPOINTMENT (OUTPATIENT)
Dept: PHYSICAL THERAPY | Facility: CLINIC | Age: 84
DRG: 308 | End: 2019-07-01
Payer: MEDICARE

## 2019-07-01 VITALS
SYSTOLIC BLOOD PRESSURE: 104 MMHG | HEART RATE: 80 BPM | BODY MASS INDEX: 23.02 KG/M2 | RESPIRATION RATE: 16 BRPM | TEMPERATURE: 97.6 F | OXYGEN SATURATION: 99 % | DIASTOLIC BLOOD PRESSURE: 53 MMHG | WEIGHT: 129.9 LBS | HEIGHT: 63 IN

## 2019-07-01 LAB
ANION GAP SERPL CALCULATED.3IONS-SCNC: 4 MMOL/L (ref 3–14)
BUN SERPL-MCNC: 17 MG/DL (ref 7–30)
CALCIUM SERPL-MCNC: 8.9 MG/DL (ref 8.5–10.1)
CHLORIDE SERPL-SCNC: 104 MMOL/L (ref 94–109)
CO2 SERPL-SCNC: 30 MMOL/L (ref 20–32)
CREAT SERPL-MCNC: 0.57 MG/DL (ref 0.52–1.04)
GFR SERPL CREATININE-BSD FRML MDRD: 83 ML/MIN/{1.73_M2}
GLUCOSE SERPL-MCNC: 107 MG/DL (ref 70–99)
INR PPP: 2.11 (ref 0.86–1.14)
NT-PROBNP SERPL-MCNC: 2502 PG/ML (ref 0–1800)
POTASSIUM SERPL-SCNC: 3.7 MMOL/L (ref 3.4–5.3)
SODIUM SERPL-SCNC: 138 MMOL/L (ref 133–144)

## 2019-07-01 PROCEDURE — 83880 ASSAY OF NATRIURETIC PEPTIDE: CPT | Performed by: PHYSICIAN ASSISTANT

## 2019-07-01 PROCEDURE — 25000128 H RX IP 250 OP 636: Performed by: HOSPITALIST

## 2019-07-01 PROCEDURE — G0463 HOSPITAL OUTPT CLINIC VISIT: HCPCS | Performed by: NURSE PRACTITIONER

## 2019-07-01 PROCEDURE — 25000132 ZZH RX MED GY IP 250 OP 250 PS 637: Mod: GY | Performed by: HOSPITALIST

## 2019-07-01 PROCEDURE — 25000132 ZZH RX MED GY IP 250 OP 250 PS 637: Mod: GY | Performed by: INTERNAL MEDICINE

## 2019-07-01 PROCEDURE — 85610 PROTHROMBIN TIME: CPT | Performed by: HOSPITALIST

## 2019-07-01 PROCEDURE — 97116 GAIT TRAINING THERAPY: CPT | Mod: GP | Performed by: PHYSICAL THERAPIST

## 2019-07-01 PROCEDURE — 80048 BASIC METABOLIC PNL TOTAL CA: CPT | Performed by: PHYSICIAN ASSISTANT

## 2019-07-01 PROCEDURE — 97530 THERAPEUTIC ACTIVITIES: CPT | Mod: GP | Performed by: PHYSICAL THERAPIST

## 2019-07-01 PROCEDURE — 71046 X-RAY EXAM CHEST 2 VIEWS: CPT

## 2019-07-01 PROCEDURE — 99233 SBSQ HOSP IP/OBS HIGH 50: CPT | Performed by: INTERNAL MEDICINE

## 2019-07-01 PROCEDURE — 40000257 ZZH STATISTIC CONSULT NO CHARGE VASC ACCESS

## 2019-07-01 PROCEDURE — 99239 HOSP IP/OBS DSCHRG MGMT >30: CPT | Performed by: PHYSICIAN ASSISTANT

## 2019-07-01 RX ORDER — DILTIAZEM HYDROCHLORIDE 60 MG/1
60 CAPSULE, EXTENDED RELEASE ORAL 2 TIMES DAILY
Qty: 30 CAPSULE | Refills: 0 | Status: SHIPPED | OUTPATIENT
Start: 2019-07-01 | End: 2019-07-01

## 2019-07-01 RX ORDER — WARFARIN SODIUM 4 MG/1
4 TABLET ORAL
Status: DISCONTINUED | OUTPATIENT
Start: 2019-07-01 | End: 2019-07-01 | Stop reason: HOSPADM

## 2019-07-01 RX ORDER — DILTIAZEM HYDROCHLORIDE 180 MG/1
180 CAPSULE, EXTENDED RELEASE ORAL DAILY
Qty: 30 CAPSULE | Refills: 1 | Status: SHIPPED | OUTPATIENT
Start: 2019-07-02 | End: 2019-07-01

## 2019-07-01 RX ORDER — DILTIAZEM HYDROCHLORIDE 60 MG/1
60 CAPSULE, EXTENDED RELEASE ORAL DAILY
Qty: 30 CAPSULE | Refills: 0 | Status: SHIPPED | OUTPATIENT
Start: 2019-07-01 | End: 2019-07-03

## 2019-07-01 RX ORDER — DILTIAZEM HYDROCHLORIDE 120 MG/1
120 CAPSULE, EXTENDED RELEASE ORAL DAILY
COMMUNITY
Start: 2019-07-01 | End: 2019-07-03

## 2019-07-01 RX ORDER — HEPARIN SODIUM (PORCINE) LOCK FLUSH IV SOLN 100 UNIT/ML 100 UNIT/ML
5 SOLUTION INTRAVENOUS
Status: DISCONTINUED | OUTPATIENT
Start: 2019-07-01 | End: 2019-07-01 | Stop reason: HOSPADM

## 2019-07-01 RX ADMIN — ACYCLOVIR 400 MG: 400 TABLET ORAL at 10:07

## 2019-07-01 RX ADMIN — METOPROLOL SUCCINATE 150 MG: 100 TABLET, EXTENDED RELEASE ORAL at 10:05

## 2019-07-01 RX ADMIN — SODIUM CHLORIDE, PRESERVATIVE FREE 5 ML: 5 INJECTION INTRAVENOUS at 05:22

## 2019-07-01 RX ADMIN — DILTIAZEM HYDROCHLORIDE 180 MG: 180 CAPSULE, EXTENDED RELEASE ORAL at 10:06

## 2019-07-01 RX ADMIN — SODIUM CHLORIDE, PRESERVATIVE FREE 5 ML: 5 INJECTION INTRAVENOUS at 14:33

## 2019-07-01 RX ADMIN — FUROSEMIDE 40 MG: 40 TABLET ORAL at 10:06

## 2019-07-01 RX ADMIN — POTASSIUM CHLORIDE 20 MEQ: 1.5 POWDER, FOR SOLUTION ORAL at 10:08

## 2019-07-01 ASSESSMENT — ACTIVITIES OF DAILY LIVING (ADL)
ADLS_ACUITY_SCORE: 25
ADLS_ACUITY_SCORE: 21
ADLS_ACUITY_SCORE: 25
ADLS_ACUITY_SCORE: 21
ADLS_ACUITY_SCORE: 21

## 2019-07-01 ASSESSMENT — MIFFLIN-ST. JEOR: SCORE: 998.22

## 2019-07-01 NOTE — PROGRESS NOTES
[facility]  Cardiology Progress Note  Date of Service: 07/01/2019  Primary Cardiologist: Dr. Rivera    Assessment & Plan    Amira Arreola is a 86 year old female with past medical history significant for   HFpEF, HTN, chronic afib, multiple myeloma with mets admitted on 6/28/2019 with tachycardia and .      Assessment:  1.  Chronic afib, now with RVR   - CHADS VASC 3, on coumadin   - dilt decreased in clinic due to hypotension sbp 80s   - bps stable hear and hr controlled, if problematic in the future could consider dilt or amio    2.  HFpEF, with severe PH   - dry wt felt to be 125-128#   - wt today 129, jvp visible and at ~8 cm, but LLL breath sounds are absent concern for effusion   - on lasix 40 mg pta    3.  Multiple myeloma with mets- chemo on hold, end of life discussions ongoing, outpt plan to see Dr. Farris    4.  Moderate MR, mod TR- can follow    Plan:   1. Continue current meds  2. Repeat bmp, bnp, and cxr today prior to discharge  3. Likely able to discharge later today pending above  4. F/u arranged with Elisabeth Means NP this week- will arrange with  Dr. Farris as well.     Yesi Barnhart PA-C  [facility]  Pager: [phone]     Interval History   States she feels great.  Denies pain or sob.  Legs are better.  Wants to go home.      Physical Exam   Temp: 97.6  F (36.4  C) Temp src: Oral BP: 101/56 Pulse: 77 Heart Rate: 89 Resp: 16 SpO2: 94 % O2 Device: None (Room air)    Vitals:    06/28/19 2345 06/30/19 0605 07/01/19 0359   Weight: 60.9 kg (134 lb 3.2 oz) 57.6 kg (127 lb) 58.9 kg (129 lb 14.4 oz)       Constitutional   alert and oriented, in no acute distress.  Skin   warm and dry to touch  Neck  8 cm JVP at 30d  Lungs absent LLL, rt clear, upper lobes are clear  Cardiac irr, irr, II/VI systolic murmur.    Abdomen   abdomen soft, bowel sounds normoactive, no hepatosplenomegaly  Extremities and Back   trace edema.      Medications     - MEDICATION INSTRUCTIONS -       Warfarin Therapy  Reminder         acyclovir  400 mg Oral BID     diltiazem ER  180 mg Oral Daily     furosemide  40 mg Oral Daily     heparin  5 mL Intracatheter Q28 Days     heparin lock flush  5-10 mL Intracatheter Q24H     latanoprost  1 drop Right Eye At Bedtime     metoprolol succinate ER  150 mg Oral BID     potassium chloride  20 mEq Oral BID     senna-docusate  1 tablet Oral BID    Or     senna-docusate  2 tablet Oral BID     sodium chloride (PF)  3 mL Intracatheter Q8H       Data   Most Recent 3 CBC's:  Recent Labs   Lab Test 06/28/19 2114 06/05/19  0937 05/22/19  1320   WBC 7.2 10.4 17.1*   HGB 12.9 11.2* 12.2   MCV 96 94 94    170 190     Most Recent 3 BMP's:  Recent Labs   Lab Test 06/28/19 2114 06/26/19  1104 05/15/19  1002    134* 137   POTASSIUM 3.6 4.2 4.3   CHLORIDE 104 105 104   CO2 28 23 27   BUN 22 16 22   CR 0.70 0.82 1.04   ANIONGAP 6 10.2 10.3   BRADLEY 9.0 9.5 9.7   * 156* 129*     Most Recent 3 Troponin's:  Recent Labs   Lab Test 06/30/19  0545 06/28/19 2114 03/23/19  0708   TROPI <0.015 <0.015 <0.015     Most Recent 3 BNP's:  Recent Labs   Lab Test 06/28/19 2114 04/01/19  0735 03/29/19  0751 03/22/19  2044   NTBNPI 13,367* 3,865*  --  4,828*   NTBNP  --   --  4,439*  --      Last 24 hours labs reviewed     Tele: afib HRs 70s, 90s    Echo: 3/19  The left ventricle is normal in size.  The visual ejection fraction is estimated at 55-60%.  No regional wall motion abnormalities noted.  There is moderate (2+) mitral regurgitation.  There is moderate (2+) tricuspid regurgitation.  Severe (>55mmHg) pulmonary hypertension is present.  The rhythm was rapid atrial fibrillation.    Last ischemic eval: none    Device: none

## 2019-07-01 NOTE — CONSULTS
Care Coordination:    JAYDEN Griffith updated that patient is discharge today pending cardiology.  Therapy indicates recommendations for home 24hr supervision and HH PT/OT.  Met with patient and explained role. She asked I talk with her daughter Yesi ([phone]).  Left message for Yesi.  Received a call back from Dtr Olesya ([phone]).  Explained recommendations from therapy and she stated family would be able to provide 24hr supervision.  Pt currently has HHA 3x wk, 4hrs/day through [facility] with periodic RN visits.  This is covered under her LTC benefit.  Family would like to keep HHA with [facility].      Noted patient has discharge orders/plans for home care including Home (RN/OT/PT)  Patient was given choice in selecting homecare and chose [facility]  Referral sent (7/1) and confirmed.  Provided contact information on AVS for pt to call if they have questions for  [facility] [phone]  Daugther Olesya would like to be contacted by [facility] for set up, message left with her number in email to [facility].     Son cancelled appointment with Palliative on 7/2 as they thought patient would still be in the hospital.  They have an appt with APRN on July 3rd and will discuss at this time.  PA wishes to have f/u with Dr. Frias.  July 10th, 11:45 to assess post hospital follow up.  Daughter updated and added to AVS.     Josh Dacosta would like update on discharge when patient is ready.     CC will continue to follow for discharge needs.     Galina Carroll RN BSN  Care Coordinator

## 2019-07-01 NOTE — DISCHARGE SUMMARY
[facility]  Hospitalist Discharge Summary       Date of Admission:  6/28/2019  Date of Discharge:  7/1/2019  Discharging Provider: Anali Hernandez PA-C      Discharge Diagnoses   Atrial fibrillation with RVR  Acute on chronic diastolic heart failure  Bilateral Pleural Effusions  Fatigue, weakness  Metastatic Multiple Myeloma  Goals of Care    Follow-ups Needed After Discharge   Follow-up Appointments     Follow-up and recommended labs and tests       Follow up with Cardiology as scheduled  Can consider follow up with Palliative Care.             Unresulted Labs Ordered in the Past 30 Days of this Admission     No orders found from 4/29/2019 to 6/29/2019.      These results will be followed up by     Discharge Disposition   Discharged to home  Condition at discharge: Stable    Hospital Course   HPI from H&P per Dr. Riccardo MD  Amira Arreola is a 86 year old female with history of heart failure, chronic atrial fibrillation anticoagulated on warfarin, thrombocytopenia, multiple myeloma, pulmonary hypertension, SVT, multiple other comorbidities and recent cognitive decline who comes to the ED from home with her daughter with complaints of fatigue and weakness.  Much of this history is provided by [place] patient's daughter as she is quite somnolent at this point.  Patient is arousable and can converse briefly and denies any complaints.  Renoter tells me that Amira lives at home with her  and her son and has home health aides.  Venice was called by her father who relayed that Amira was quite lethargic, weak and fatigued.  I am not being told that patient never relate any chest pain or any real symptoms but she does reportedly minimize things.  There was concern that she might have had some shortness of breath as well.  The symptoms were noted today and therefore they brought her to the ER.  Upon arrival to the ER she had an heart rates in the 140s to 160s and this was noted to be atrial  fibrillation.  Blood pressures were variable but were stabilized and are now in the 1 teens systolically.  She is saturating normally on room air.  She did receive diltiazem bolus with subsequent normalization of heart rate.  Seems she was recently seen in the core clinic few days ago.  At that point her diltiazem had been decreased.  Prior to that visit her diltiazem was titrated up for reportedly borderline higher heart rates.  As aforementioned patient is quite somnolent at this point likely at least in part because it is 11 PM.  She does arouse and answer simple questions and follow simple commands.  She denies any shortness of breath, fevers, chills, nausea, vomiting, abdominal pain, diarrhea, any chest pain, or palpitations.     In further discussion it seems that Amira has been declining cognitively especially of the last several months.  She had been previously receiving chemotherapy for multiple myeloma but this was stopped a few months ago-especially in light of ongoing heart failure and chronic atrial fibrillation management.  Her daughter reports that she gets confused and has used a cell phone for the TV remote and has other short-term memory issues at times.  Some sundowning also noted.  No focal deficits reported and no hallucinations.  Additionally patient has been refusing her Lasix doses increasingly over the last couple months.  As aforementioned she has been following with the core clinic and given her heart failure Venice says that a palliative consultation was planned for this coming Tuesday.  We discussed this in detail and overall patient and family have decided that she is a full code at this point -further discussion to be had in the coming days.  Amira's other daughter is an ophthalmologist lives in [place] and is essentially the primary healthcare person for her.     In the ED she is afebrile currently hemodynamically stable although systolics were in the 90s briefly.  She saturating  normally on room air.  Heart rates initially up to 160 currently 70s and 80s.  Lab work yielded fairly unremarkable BMP with blood sugar 140.  Albumin 3.1 transaminases mildly elevated at 79 ALT 44 AST respectively.  Troponin I less than 0.015.  WBC 7.2 Hgb 12.9 platelets 199.  INR 3.89.  Chest x-ray notable for by basilar pleural effusions and some infiltrates felt to be likely atelectasis.  EKG shows irregularly irregular rhythm with a rapid ventricular response initially.  I discussed the case with Dr. Pyle.  Patient will be admitted to the [facility] for continued management of atrial fibrillation with rapid ventricular response as well as likely acute heart failure exacerbation she does appear fluid overloaded.     Atrial fibrillation with RVR: Patient's diltiazem dosage  was decreased a few days PTA from 180 mg daily to 120 due to hypotension. Additionally she was on Metoprolol  mg bid  - Cardiology consulted  - Diltiazem increased back to 180 mg/d and continued on PTA Metoprolol. Rates improved. No issues with hypotension this hospitalization  - Continued anticoagulation with Warfarin.        Acute on chronic diastolic heart failure  Severe pulmonary hypertension  Mod mitral regurg, mod tricuspid regurgitation: Follows with CORE clinic. [place] [address] on admission. CXR with bilateral pleural effusions. Apparently has some issues with Lasix complience as outpatient due to urinary frequency  - Received 1 dose IV Lasix and resumed on PTA regimen. Off O2  - Repeat BNP down to 2000. CXR with persistent pleural effusions but saturating 99% on RA  - Continue oral Lasix on discharge  - CORE follow up arranged 7/3    Bilateral Pleural Effusion: Noted on admission xray  - Patient diuresed with oral lasix. BNP trended down; however, repeat CXR with increasing effusion. Given history of metastatic multiple myeloma question if may be malignant.  Patient completely asymptomatic. Denies SOB. Saturating well on room  air. Discussed with family. Offered to prolong hospitalization and proceed with diagnostic thoracentesis vs monitoring as outpatient. Family elected for outpatient management. Patient will follow up with PCP on July 10 th and could consider outpatient thoracentesis at that time if family desires. Patient has follow up with Dr. Roberts of oncology on August 7th.    Fatigue, weakness  Ongoing progressive cognitive decline  Likely this is secondary to atrial fibrillation with RVR in setting of Pulm HTN and HFpEF,  and chronic cognitive changes with advanced age and underlying multiple comorbidities.  -- PT/OT following. Discharging home with home care and assist from family    Metastatic Multiple Myeloma  Goals of Care:  Follows with Dr Roberts with [facility]; previously on chemo which has been held for some time. Had been referred to Palliative Clinic by Cardiology PTA. Palliative team consult here but patient stabilized over the weekend. Family would prefer to re-establish as outpatient if they feel this is necessary    Consultations This Hospital Stay   CARDIOLOGY IP CONSULT  PHYSICAL THERAPY ADULT IP CONSULT  OCCUPATIONAL THERAPY ADULT IP CONSULT  PHARMACY TO DOSE WARFARIN  CARE TRANSITION RN/SW IP CONSULT  PALLIATIVE CARE ADULT IP CONSULT  WOUND OSTOMY CONTINENCE NURSE  IP CONSULT  CARE TRANSITION RN/SW IP CONSULT    Code Status   Full Code    Time Spent on this Encounter   I, Anali Hernandez, personally saw the patient today and spent greater than 30 minutes discharging this patient.       Anali Hernandez, BARBARA  [facility]  ______________________________________________________________________    Physical Exam   Vital Signs: Temp: 97.6  F (36.4  C) Temp src: Oral BP: 104/53 Pulse: 80 Heart Rate: 89 Resp: 16 SpO2: 99 % O2 Device: None (Room air)    Weight: 129 lbs 14.4 oz  Constitutional: Alert, resting comfortably in NAD  HEENT: Neck supple, no elevated JVD  Respiratory: Normal effort,  no wheezing, diminished left base  Cardiovascular: Irregular, rate controlled, systolic murmur noted  GI: Non distended, normal bowels sounds, no tenderness or guarding  MSK: LE without edema. Dorsalis pedis pulse palpated bilaterally.   Skin/Integumen: Clear  Neuro: CN II-XII grossly intact  Psych:  Alert and oriented x 2. Normal affect         Primary Care Physician   Addy Frias    Discharge Orders      Home care nursing referral      Home Care PT Referral for Hospital Discharge      Home Care OT Referral for Hospital Discharge      Reason for your hospital stay    You were admitted for evaluation for Afib with RVR. Your medications were adjusted. You will continue on this regimen and follow up with Cardiology as outpatient.     Follow-up and recommended labs and tests     Follow up with Cardiology as scheduled  Can consider follow up with Palliative Care.     Activity    Your activity upon discharge: activity as tolerated     MD face to face encounter    Documentation of Face to Face and Certification for Home Health Services    I certify that patient: Amira Arreola is under my care and that I, or a nurse practitioner or physician's assistant working with me, had a face-to-face encounter that meets the physician face-to-face encounter requirements with this patient on: 7/1/2019.    This encounter with the patient was in whole, or in part, for the following medical condition, which is the primary reason for home health care: Atrial Fibrillation with RVR.    I certify that, based on my findings, the following services are medically necessary home health services: Nursing, Occupational Therapy and Physical Therapy.    My clinical findings support the need for the above services because: Nurse is needed: To assess HR after changes in medications or other medical regimen.., Occupational Therapy Services are needed to assess and treat cognitive ability and address ADL safety due to impairment in generalized  weakness. and Physical Therapy Services are needed to assess and treat the following functional impairments: generalized weakness.    Further, I certify that my clinical findings support that this patient is homebound (i.e. absences from home require considerable and taxing effort and are for medical reasons or [employer] services or infrequently or of short duration when for other reasons) because: Requires assistance of another person or specialized equipment to access medical services because patient: Requires supervision of another for safe transfer...    Based on the above findings. I certify that this patient is confined to the home and needs intermittent skilled nursing care, physical therapy and/or speech therapy.  The patient is under my care, and I have initiated the establishment of the plan of care.  This patient will be followed by a physician who will periodically review the plan of care.  Physician/Provider to provide follow up care: Addy Frias    Attending hospital physician (the Medicare certified [facility] provider): Dr. Stephane DO  Physician Signature: See electronic signature associated with these discharge orders.  Date: 7/1/2019     Diet    Follow this diet upon discharge: Orders Placed This Encounter      Room Service      Regular Diet Adult       Significant Results and Procedures   Most Recent 3 BMP's:  Recent Labs   Lab Test 07/01/19  1058 06/28/19 2114 06/26/19  1104    138 134*   POTASSIUM 3.7 3.6 4.2   CHLORIDE 104 104 105   CO2 30 28 23   BUN 17 22 16   CR 0.57 0.70 0.82   ANIONGAP 4 6 10.2   BRADLEY 8.9 9.0 9.5   * 140* 156*     Most Recent 3 INR's:  Recent Labs   Lab Test 07/01/19  0525 06/30/19  0545 06/29/19  1056   INR 2.11* 2.24* 2.85*     Most Recent 3 Troponin's:  Recent Labs   Lab Test 06/30/19  0545 06/28/19 2114 03/23/19  0708   TROPI <0.015 <0.015 <0.015     Most Recent 3 BNP's:  Recent Labs   Lab Test 07/01/19  1058 06/28/19 2114 04/01/19  0735  03/29/19  0751   NTBNPI 2,502* 13,367* 3,865*  --    NTBNP  --   --   --  4,439*   ,   Results for orders placed or performed during the hospital encounter of 06/28/19   XR Chest 2 Views    Narrative    CHEST TWO VIEWS    6/28/2019 10:54 PM     HISTORY: Congestive heart failure. Tachycardia.    COMPARISON: 3/22/2019.    FINDINGS: Right chest wall port. No pneumothorax. The heart is  enlarged. There is no pulmonary edema. There are bilateral pleural  effusions and bibasilar probable atelectasis. Degenerative disease and  compression fractures in the thoracic spine as seen previously.      Impression    IMPRESSION: Bilateral pleural effusions and bibasilar infiltrates,  likely atelectasis.    ASCENCION EVANGELISTA MD   XR Chest 2 Views    Narrative    CHEST TWO VIEWS  7/1/2019 11:59 AM     HISTORY:  Follow up left pleural effusion.    COMPARISON: 6/28/2019      Impression    IMPRESSION: Increased left pleural effusion and increased small right  pleural effusion. Port-A-Cath is unchanged. Cardiomegaly is probably  present but obscured by left pleural effusion. Upper lungs are clear.  Old mid to lower thoracic compression fractures.       Discharge Medications   Current Discharge Medication List      CONTINUE these medications which have CHANGED    Details   diltiazem ER (DILT-XR) 180 MG 24 hr capsule Take 1 capsule (180 mg) by mouth daily  Qty: 30 capsule, Refills: 1    Comments: Future refills by PCP Dr. Addy Frias with phone number [phone].  Associated Diagnoses: Atrial fibrillation with rapid ventricular response (H)         CONTINUE these medications which have NOT CHANGED    Details   acetaminophen (TYLENOL) 500 MG tablet Take 500 mg by mouth every 6 hours as needed for mild pain       acyclovir (ZOVIRAX) 400 MG tablet Take 1 tablet (400 mg) by mouth 2 times daily  Qty: 60 tablet, Refills: 3    Associated Diagnoses: Acute on chronic congestive heart failure, unspecified heart failure type (H); Multiple myeloma  not having achieved remission (H)      Carboxymethylcellulose Sod PF (REFRESH PLUS) 0.5 % SOLN ophthalmic solution 1 drop 4 times daily as needed for dry eyes      dexamethasone (DECADRON) 4 MG tablet Take 20mg (5 tablets) by mouth every week.  Qty: 28 tablet, Refills: 3    Associated Diagnoses: Multiple myeloma not having achieved remission (H)      diphenhydrAMINE-acetaminophen (TYLENOL PM)  MG tablet Take 2 tablets by mouth At Bedtime      furosemide (LASIX) 40 MG tablet Take 1 tablet (40 mg) by mouth daily  Qty: 60 tablet, Refills: 1    Associated Diagnoses: (HFpEF) heart failure with preserved ejection fraction (H)      latanoprost (XALATAN) 0.005 % ophthalmic solution Place 1 drop into the right eye At Bedtime      lidocaine-prilocaine (EMLA) cream Apply to port site 1 hour prior to access  Qty: 30 g, Refills: 1    Associated Diagnoses: Multiple myeloma not having achieved remission (H)      metoprolol succinate ER (TOPROL-XL) 50 MG 24 hr tablet Take 3 tablets (150 mg) by mouth 2 times daily  Qty: 180 tablet, Refills: 3    Associated Diagnoses: (HFpEF) heart failure with preserved ejection fraction (H)      Multiple Vitamins-Minerals (DAILY MULTIVITAMIN) CAPS Take 1 tablet by mouth daily     Associated Diagnoses: Routine general medical examination at a health care facility      oxyCODONE (ROXICODONE) 5 MG tablet Take 1 tablet (5 mg) by mouth every 4 hours as needed for pain maximum 4 tablet(s) per day  Qty: 30 tablet, Refills: 0    Associated Diagnoses: Multiple myeloma not having achieved remission (H)      polyethylene glycol (MIRALAX/GLYCOLAX) powder Take 17 g by mouth daily as needed for constipation     Associated Diagnoses: Bilateral leg edema      potassium chloride (KLOR-CON) 20 MEQ packet Take 20 mEq by mouth 2 times daily  Qty: 60 packet, Refills: 1    Associated Diagnoses: (HFpEF) heart failure with preserved ejection fraction (H)      prochlorperazine (COMPAZINE) 10 MG tablet Take 1 tablet  (10 mg) by mouth every 6 hours as needed for nausea or vomiting  Qty: 30 tablet, Refills: 1    Associated Diagnoses: Multiple myeloma not having achieved remission (H)      SIMBRINZA 1-0.2 % ophthalmic suspension Place 1 drop into the right eye 2 times daily 1 drop AM and PM  Refills: 2      Sodium Fluoride (SF 5000 PLUS) 1.1 % CREA Apply to affected area 3 times daily      vitamin D3 (VITAMIN D3) 1000 units (25 mcg) tablet Take 1,000 Units by mouth daily      warfarin (COUMADIN) 4 MG tablet Take one tablet (4 mg) by mouth on Tuesdays and Saturdays; take one-half tablet ( 2 mg) on all other days of the week.           Allergies   Allergies   Allergen Reactions     Blood Transfusion Related (Informational Only)      Blood antigens related to receiving Darzelex-AG     Penicillin [Penicillins] Rash     Blotches on chest

## 2019-07-01 NOTE — DISCHARGE INSTRUCTIONS
Your doctor has ordered home care to help you after your hospital stay.  They will contact you regarding your 1st visit.  The service will be provided by [facility].  If you have not received a call within 48hrs of discharge, please call them at [phone]

## 2019-07-01 NOTE — PLAN OF CARE
Discharge Planner PT   Patient plan for discharge: Home today  Current status: Pt performs bed mobility with SBA. Transfers sit<>stand to FWW with SBA, without AD CGA and pt reaching for support. Pt ambulated 200' with FWW and SBA. Able to ascend/descend 5 steps with rail and SBA.  Barriers to return to prior living situation: Supervision for mobility  Recommendations for discharge: Home with 24hr supervision for mobility and Home PT  Rationale for recommendations: Pt currently requiring supervision for mobility with AD, anticipate safe disch to home if pt's family able to provide 24hr supervision and pt agreeable to using WW. Pt will benefit from continued skilled PT services to progress balance and functional activity tolerance. Pt appropriate for Home PT as she would require AD and person to leave the home.       Entered by: Magy Lopez 07/01/2019 9:42 AM

## 2019-07-01 NOTE — PROGRESS NOTES
"Brief Cardiology Note Addendum from previous  2019  Amira Arreola,  1932    Amira Arreola is a 86 year old female with past medical history significant for HFpEF, HTN, chronic afib, multiple myeloma with mets admitted on 2019 with tachycardia and .       Assessment:  1.  Chronic afib, now with RVR              - CHADS VASC 3, on coumadin              - dilt decreased in clinic due to hypotension sbp 80s              - bps stable hear and hr controlled, if problematic in the future could consider dilt or amio     2.  HFpEF, with severe PH              - dry wt felt to be 125-128#              - wt today 129, jvp visible and at ~8 cm, but LLL breath sounds are absent concern for effusion              - on lasix 40 mg pta     3.  Multiple myeloma with mets- chemo on hold, end of life discussions ongoing, outpt plan to see Dr. Farris     4.  Moderate MR, mod TR- can follow    ADDENDUM: Repeat  ntprobnp dropped from 13k on admission to 2.5k today, Cr stable at 0.57, but CXR with large L pleural effusion.  Given other CHF indices are lower suspect that effusion is malignant.  Discussed with team: pt is on room air, hr controlled and feels \"great\".  She's also on coumadin with therapeutic INR at 2.11- no clear indication to hold coumadin and send for thora given asymptomatic.  Could be completed as an oupt by PCP, hem/onc- diagnostic tap would be helpful.  Team agreed to let pt discharge.  Early follow up being arrange with pcp.    Yesi Barnhart PA-C 2019 2:43 PM          "

## 2019-07-01 NOTE — PLAN OF CARE
OT: declined OT at set time as pt states she is leaving soon. Family had just arrived with w/c for discharge

## 2019-07-01 NOTE — PLAN OF CARE
Neuro- A&O to self and situation, can be disoriented to place and time intermittently  Vitals- stable  Tele/Cardiac- A fib CVR, denies CP  Resp- stable on RA, gets slight SALAZAR  Activity- A-1 with GB + walker  Pain- denies  Drips- none, SL  Drains/Tubes- none  Skin- red usamn area, red blanchable coccyx  GI/- can be incontinent at times  Plan- Possible discharge today to prior living  Misc- oral diltiazem increased for better rate control

## 2019-07-01 NOTE — PROGRESS NOTES
[facility]  WO Nurse Inpatient Wound Assessment     Assessment of wound(s) on pt's:  Left toes, right heel        Data:   Patient History:   Per MD notes, Amira Arreola is a 86 year old woman with PMH significant for his heart failure, chronic atrial fibrillation anticoagulated on warfarin, thrombocytopenia, multiple myeloma, pulmonary hypertension, SVT. She was admitted for complaints of fatigue and weakness,and found to have Atrial Fibrillation with RVR.           Moisture Management:  Adult brief    Current Diet / Nutrition:       Orders Placed This Encounter        Regular Diet Adult               Flip Assessment and sub scores:   No data recorded     Labs:         Recent Labs   Lab Test 07/01/19  0525  06/28/19  2114   ALBUMIN  --   --  3.1*   HGB  --   --  12.9   RBC  --   --  4.02   WBC  --   --  7.2   PLT  --   --  199   INR 2.11*   < > 3.89*    < > = values in this interval not displayed.          Skin assessment  Wound History:  Pt unable to give history of any skin issues  Left toes with no open areas or redness.  Right heel intact, no redness.  Buttocks with very small mild redness, very easily blanchable.         Intervention:     Patient's chart evaluated.      Skin was assessed.    Discussed assessment with patient and nurse Bee.     Discussed with patient to shift in bed or chair every 15 minutes and to avoid sleeping on back, pt understands.       Assessment:       Skin intact. WOC will sign off.      Possible discharge to home today.        Plan:     Nursing to notify the Provider(s) and re-consult the WOC Nurse if skin deteriorates.  WOC Nurse will return: will sign off

## 2019-07-02 NOTE — PLAN OF CARE
Physical Therapy Discharge Summary    Reason for therapy discharge:    Discharged to home with home therapy.    Progress towards therapy goal(s). See goals on Care Plan in [facility] electronic health record for goal details.  Goals partially met.  Barriers to achieving goals:   discharge from facility.    Therapy recommendation(s):    Continued therapy is recommended.  Rationale/Recommendations:   Pt currently requiring supervision for mobility with AD, anticipate safe disch to home if pt's family able to provide 24hr supervision and pt agreeable to using WW. Pt will benefit from continued skilled PT services to progress balance and functional activity tolerance. Pt appropriate for Home PT as she would require AD and person to leave the home..

## 2019-07-02 NOTE — PLAN OF CARE
Occupational Therapy Discharge Summary    Reason for therapy discharge:    Discharged to home with home therapy.    Progress towards therapy goal(s). See goals on Care Plan in [facility] electronic health record for goal details.  Goals partially met.  Barriers to achieving goals:   discharge from facility.    Therapy recommendation(s):    Continued therapy is recommended.  Rationale/Recommendations:  HHOT.

## 2019-07-03 ENCOUNTER — OFFICE VISIT (OUTPATIENT)
Dept: CARDIOLOGY | Facility: CLINIC | Age: 84
End: 2019-07-03
Attending: NURSE PRACTITIONER
Payer: MEDICARE

## 2019-07-03 ENCOUNTER — APPOINTMENT (OUTPATIENT)
Dept: GENERAL RADIOLOGY | Facility: CLINIC | Age: 84
DRG: 242 | End: 2019-07-03
Attending: EMERGENCY MEDICINE
Payer: MEDICARE

## 2019-07-03 ENCOUNTER — HOSPITAL ENCOUNTER (INPATIENT)
Facility: CLINIC | Age: 84
LOS: 4 days | Discharge: NURSING, REHAB OR LONG-TERM CARE | DRG: 242 | End: 2019-07-07
Attending: EMERGENCY MEDICINE | Admitting: HOSPITALIST
Payer: MEDICARE

## 2019-07-03 ENCOUNTER — PATIENT OUTREACH (OUTPATIENT)
Dept: FAMILY MEDICINE | Facility: CLINIC | Age: 84
End: 2019-07-03

## 2019-07-03 VITALS
DIASTOLIC BLOOD PRESSURE: 47 MMHG | BODY MASS INDEX: 21.62 KG/M2 | HEIGHT: 65 IN | OXYGEN SATURATION: 93 % | SYSTOLIC BLOOD PRESSURE: 65 MMHG | HEART RATE: 74 BPM

## 2019-07-03 DIAGNOSIS — I48.91 RAPID ATRIAL FIBRILLATION (H): ICD-10-CM

## 2019-07-03 DIAGNOSIS — C90.00 MULTIPLE MYELOMA NOT HAVING ACHIEVED REMISSION (H): ICD-10-CM

## 2019-07-03 DIAGNOSIS — I50.30 (HFPEF) HEART FAILURE WITH PRESERVED EJECTION FRACTION (H): ICD-10-CM

## 2019-07-03 DIAGNOSIS — I50.32 CHRONIC DIASTOLIC HEART FAILURE (H): ICD-10-CM

## 2019-07-03 DIAGNOSIS — I48.0 PAROXYSMAL ATRIAL FIBRILLATION (H): ICD-10-CM

## 2019-07-03 DIAGNOSIS — I50.21 ACUTE SYSTOLIC CONGESTIVE HEART FAILURE (H): ICD-10-CM

## 2019-07-03 DIAGNOSIS — C79.51 CANCER, METASTATIC TO BONE (H): Primary | ICD-10-CM

## 2019-07-03 DIAGNOSIS — I50.32 CHRONIC HEART FAILURE WITH PRESERVED EJECTION FRACTION (H): ICD-10-CM

## 2019-07-03 DIAGNOSIS — E87.6 HYPOKALEMIA: ICD-10-CM

## 2019-07-03 DIAGNOSIS — J90 BILATERAL PLEURAL EFFUSION: ICD-10-CM

## 2019-07-03 PROBLEM — I24.9 ACS (ACUTE CORONARY SYNDROME) (H): Status: ACTIVE | Noted: 2019-07-03

## 2019-07-03 LAB
ALBUMIN UR-MCNC: NEGATIVE MG/DL
ANION GAP SERPL CALCULATED.3IONS-SCNC: 7 MMOL/L (ref 3–14)
APPEARANCE UR: CLEAR
BACTERIA #/AREA URNS HPF: ABNORMAL /HPF
BASE DEFICIT BLDV-SCNC: 0.5 MMOL/L
BASOPHILS # BLD AUTO: 0 10E9/L (ref 0–0.2)
BASOPHILS NFR BLD AUTO: 0.2 %
BILIRUB UR QL STRIP: NEGATIVE
BUN SERPL-MCNC: 19 MG/DL (ref 7–30)
CALCIUM SERPL-MCNC: 9.7 MG/DL (ref 8.5–10.1)
CHLORIDE SERPL-SCNC: 105 MMOL/L (ref 94–109)
CO2 SERPL-SCNC: 27 MMOL/L (ref 20–32)
COLOR UR AUTO: YELLOW
CREAT SERPL-MCNC: 0.7 MG/DL (ref 0.52–1.04)
DIFFERENTIAL METHOD BLD: ABNORMAL
EOSINOPHIL # BLD AUTO: 0 10E9/L (ref 0–0.7)
EOSINOPHIL NFR BLD AUTO: 0.2 %
ERYTHROCYTE [DISTWIDTH] IN BLOOD BY AUTOMATED COUNT: 18.3 % (ref 10–15)
GFR SERPL CREATININE-BSD FRML MDRD: 78 ML/MIN/{1.73_M2}
GLUCOSE BLDC GLUCOMTR-MCNC: 272 MG/DL (ref 70–99)
GLUCOSE SERPL-MCNC: 126 MG/DL (ref 70–99)
GLUCOSE UR STRIP-MCNC: NEGATIVE MG/DL
HCO3 BLDV-SCNC: 24 MMOL/L (ref 21–28)
HCT VFR BLD AUTO: 40 % (ref 35–47)
HGB BLD-MCNC: 13.2 G/DL (ref 11.7–15.7)
HGB UR QL STRIP: NEGATIVE
HYALINE CASTS #/AREA URNS LPF: 10 /LPF (ref 0–2)
IMM GRANULOCYTES # BLD: 0.1 10E9/L (ref 0–0.4)
IMM GRANULOCYTES NFR BLD: 0.8 %
INR PPP: 2.17 (ref 0.86–1.14)
INTERPRETATION ECG - MUSE: NORMAL
KETONES UR STRIP-MCNC: NEGATIVE MG/DL
LACTATE BLD-SCNC: 1.6 MMOL/L (ref 0.7–2)
LACTATE BLD-SCNC: 2 MMOL/L (ref 0.7–2)
LEUKOCYTE ESTERASE UR QL STRIP: NEGATIVE
LYMPHOCYTES # BLD AUTO: 0.4 10E9/L (ref 0.8–5.3)
LYMPHOCYTES NFR BLD AUTO: 4.5 %
MCH RBC QN AUTO: 31.7 PG (ref 26.5–33)
MCHC RBC AUTO-ENTMCNC: 33 G/DL (ref 31.5–36.5)
MCV RBC AUTO: 96 FL (ref 78–100)
MONOCYTES # BLD AUTO: 0.6 10E9/L (ref 0–1.3)
MONOCYTES NFR BLD AUTO: 5.9 %
MUCOUS THREADS #/AREA URNS LPF: PRESENT /LPF
NEUTROPHILS # BLD AUTO: 8.3 10E9/L (ref 1.6–8.3)
NEUTROPHILS NFR BLD AUTO: 88.4 %
NITRATE UR QL: NEGATIVE
NRBC # BLD AUTO: 0 10*3/UL
NRBC BLD AUTO-RTO: 0 /100
NT-PROBNP SERPL-MCNC: 5903 PG/ML (ref 0–1800)
OXYHGB MFR BLDV: 75 %
PCO2 BLDV: 37 MM HG (ref 40–50)
PH BLDV: 7.42 PH (ref 7.32–7.43)
PH UR STRIP: 7 PH (ref 5–7)
PLATELET # BLD AUTO: 208 10E9/L (ref 150–450)
PO2 BLDV: 42 MM HG (ref 25–47)
POTASSIUM SERPL-SCNC: 4.7 MMOL/L (ref 3.4–5.3)
RBC # BLD AUTO: 4.17 10E12/L (ref 3.8–5.2)
RBC #/AREA URNS AUTO: <1 /HPF (ref 0–2)
SODIUM SERPL-SCNC: 139 MMOL/L (ref 133–144)
SOURCE: ABNORMAL
SP GR UR STRIP: 1.01 (ref 1–1.03)
SQUAMOUS #/AREA URNS AUTO: 3 /HPF (ref 0–1)
TROPONIN I SERPL-MCNC: <0.015 UG/L (ref 0–0.04)
UROBILINOGEN UR STRIP-MCNC: NORMAL MG/DL (ref 0–2)
WBC # BLD AUTO: 9.4 10E9/L (ref 4–11)
WBC #/AREA URNS AUTO: 2 /HPF (ref 0–5)

## 2019-07-03 PROCEDURE — 99223 1ST HOSP IP/OBS HIGH 75: CPT | Performed by: INTERNAL MEDICINE

## 2019-07-03 PROCEDURE — 85025 COMPLETE CBC W/AUTO DIFF WBC: CPT | Performed by: EMERGENCY MEDICINE

## 2019-07-03 PROCEDURE — 83605 ASSAY OF LACTIC ACID: CPT | Performed by: EMERGENCY MEDICINE

## 2019-07-03 PROCEDURE — 96361 HYDRATE IV INFUSION ADD-ON: CPT

## 2019-07-03 PROCEDURE — 00000146 ZZHCL STATISTIC GLUCOSE BY METER IP

## 2019-07-03 PROCEDURE — 80048 BASIC METABOLIC PNL TOTAL CA: CPT | Performed by: EMERGENCY MEDICINE

## 2019-07-03 PROCEDURE — 96360 HYDRATION IV INFUSION INIT: CPT

## 2019-07-03 PROCEDURE — 80076 HEPATIC FUNCTION PANEL: CPT | Performed by: HOSPITALIST

## 2019-07-03 PROCEDURE — 93005 ELECTROCARDIOGRAM TRACING: CPT

## 2019-07-03 PROCEDURE — 83880 ASSAY OF NATRIURETIC PEPTIDE: CPT | Performed by: EMERGENCY MEDICINE

## 2019-07-03 PROCEDURE — 85610 PROTHROMBIN TIME: CPT | Performed by: EMERGENCY MEDICINE

## 2019-07-03 PROCEDURE — 82805 BLOOD GASES W/O2 SATURATION: CPT | Performed by: NURSE PRACTITIONER

## 2019-07-03 PROCEDURE — 84484 ASSAY OF TROPONIN QUANT: CPT | Performed by: NURSE PRACTITIONER

## 2019-07-03 PROCEDURE — 99223 1ST HOSP IP/OBS HIGH 75: CPT | Mod: AI | Performed by: NURSE PRACTITIONER

## 2019-07-03 PROCEDURE — 83605 ASSAY OF LACTIC ACID: CPT | Performed by: HOSPITALIST

## 2019-07-03 PROCEDURE — 99285 EMERGENCY DEPT VISIT HI MDM: CPT | Mod: 25

## 2019-07-03 PROCEDURE — 87040 BLOOD CULTURE FOR BACTERIA: CPT | Performed by: EMERGENCY MEDICINE

## 2019-07-03 PROCEDURE — 36415 COLL VENOUS BLD VENIPUNCTURE: CPT

## 2019-07-03 PROCEDURE — 21000001 ZZH R&B HEART CARE

## 2019-07-03 PROCEDURE — 84484 ASSAY OF TROPONIN QUANT: CPT | Performed by: EMERGENCY MEDICINE

## 2019-07-03 PROCEDURE — 36415 COLL VENOUS BLD VENIPUNCTURE: CPT | Performed by: NURSE PRACTITIONER

## 2019-07-03 PROCEDURE — 99215 OFFICE O/P EST HI 40 MIN: CPT | Performed by: INTERNAL MEDICINE

## 2019-07-03 PROCEDURE — 71046 X-RAY EXAM CHEST 2 VIEWS: CPT

## 2019-07-03 PROCEDURE — 25800030 ZZH RX IP 258 OP 636: Performed by: EMERGENCY MEDICINE

## 2019-07-03 PROCEDURE — 81001 URINALYSIS AUTO W/SCOPE: CPT | Performed by: EMERGENCY MEDICINE

## 2019-07-03 RX ORDER — ASPIRIN 325 MG
325 TABLET ORAL DAILY
Status: DISCONTINUED | OUTPATIENT
Start: 2019-07-04 | End: 2019-07-07

## 2019-07-03 RX ORDER — ACETAMINOPHEN 650 MG/1
650 SUPPOSITORY RECTAL EVERY 4 HOURS PRN
Status: DISCONTINUED | OUTPATIENT
Start: 2019-07-03 | End: 2019-07-07 | Stop reason: HOSPADM

## 2019-07-03 RX ORDER — ALUMINA, MAGNESIA, AND SIMETHICONE 2400; 2400; 240 MG/30ML; MG/30ML; MG/30ML
30 SUSPENSION ORAL EVERY 4 HOURS PRN
Status: DISCONTINUED | OUTPATIENT
Start: 2019-07-03 | End: 2019-07-07 | Stop reason: HOSPADM

## 2019-07-03 RX ORDER — SODIUM CHLORIDE 9 MG/ML
INJECTION, SOLUTION INTRAVENOUS CONTINUOUS
Status: DISCONTINUED | OUTPATIENT
Start: 2019-07-03 | End: 2019-07-04

## 2019-07-03 RX ORDER — ACETAMINOPHEN 325 MG/1
650 TABLET ORAL EVERY 4 HOURS PRN
Status: DISCONTINUED | OUTPATIENT
Start: 2019-07-03 | End: 2019-07-07 | Stop reason: HOSPADM

## 2019-07-03 RX ORDER — METOPROLOL TARTRATE 1 MG/ML
2.5 INJECTION, SOLUTION INTRAVENOUS EVERY 4 HOURS PRN
Status: DISCONTINUED | OUTPATIENT
Start: 2019-07-03 | End: 2019-07-07 | Stop reason: HOSPADM

## 2019-07-03 RX ORDER — SODIUM CHLORIDE 9 MG/ML
INJECTION, SOLUTION INTRAVENOUS ONCE
Status: COMPLETED | OUTPATIENT
Start: 2019-07-03 | End: 2019-07-03

## 2019-07-03 RX ORDER — NALOXONE HYDROCHLORIDE 0.4 MG/ML
.1-.4 INJECTION, SOLUTION INTRAMUSCULAR; INTRAVENOUS; SUBCUTANEOUS
Status: DISCONTINUED | OUTPATIENT
Start: 2019-07-03 | End: 2019-07-07 | Stop reason: HOSPADM

## 2019-07-03 RX ORDER — DILTIAZEM HYDROCHLORIDE 180 MG/1
180 CAPSULE, EXTENDED RELEASE ORAL DAILY
Status: ON HOLD | COMMUNITY
End: 2019-07-07

## 2019-07-03 RX ORDER — ONDANSETRON 4 MG/1
4 TABLET, ORALLY DISINTEGRATING ORAL EVERY 6 HOURS PRN
Status: DISCONTINUED | OUTPATIENT
Start: 2019-07-03 | End: 2019-07-07 | Stop reason: HOSPADM

## 2019-07-03 RX ORDER — METOPROLOL TARTRATE 1 MG/ML
2.5 INJECTION, SOLUTION INTRAVENOUS EVERY 6 HOURS
Status: DISCONTINUED | OUTPATIENT
Start: 2019-07-03 | End: 2019-07-03

## 2019-07-03 RX ORDER — MORPHINE SULFATE 2 MG/ML
1 INJECTION, SOLUTION INTRAMUSCULAR; INTRAVENOUS
Status: DISCONTINUED | OUTPATIENT
Start: 2019-07-03 | End: 2019-07-07 | Stop reason: HOSPADM

## 2019-07-03 RX ORDER — ONDANSETRON 2 MG/ML
4 INJECTION INTRAMUSCULAR; INTRAVENOUS EVERY 6 HOURS PRN
Status: DISCONTINUED | OUTPATIENT
Start: 2019-07-03 | End: 2019-07-07 | Stop reason: HOSPADM

## 2019-07-03 RX ORDER — BISACODYL 10 MG
10 SUPPOSITORY, RECTAL RECTAL DAILY PRN
Status: DISCONTINUED | OUTPATIENT
Start: 2019-07-03 | End: 2019-07-07 | Stop reason: HOSPADM

## 2019-07-03 RX ADMIN — SODIUM CHLORIDE: 9 INJECTION, SOLUTION INTRAVENOUS at 10:11

## 2019-07-03 ASSESSMENT — MIFFLIN-ST. JEOR
SCORE: 976.13
SCORE: 998.35

## 2019-07-03 ASSESSMENT — ENCOUNTER SYMPTOMS
FATIGUE: 1
SHORTNESS OF BREATH: 0
WEAKNESS: 1
FEVER: 0
DIZZINESS: 0
PALPITATIONS: 0
VOMITING: 0

## 2019-07-03 ASSESSMENT — ACTIVITIES OF DAILY LIVING (ADL)
ADLS_ACUITY_SCORE: 29
ADLS_ACUITY_SCORE: 29

## 2019-07-03 NOTE — ED NOTES
"[facility]  ED Nurse Handoff Report    ED Chief complaint: Hypotension      ED Diagnosis:   Final diagnoses:   None       Code Status: not addressed     Allergies:   Allergies   Allergen Reactions     Blood Transfusion Related (Informational Only)      Blood antigens related to receiving Darzelex-AG     Penicillin [Penicillins] Rash     Blotches on chest        Activity level - Baseline/Home:  Stand with Assist of 2  Activity Level - Current:   Stand with Assist of 2    Patient's Preferred language: english    Needed?: No    Isolation: No  Infection: Not Applicable  Bariatric?: No    Vital Signs:   Vitals:    07/03/19 0945 07/03/19 1008 07/03/19 1015 07/03/19 1030   BP: (!) 89/63 98/71 98/54 97/74   Pulse: 97 124 114 78   Resp: 11  16 8   Temp:       TempSrc:       SpO2: 90%  (!) 88% 99%   Weight:       Height:           Cardiac Rhythm: ,        Pain level:      Is this patient confused?: No , forgetful   Does this patient have a guardian?  No         If yes, is there guardianship documents in the Epic \"Code/ACP\" activity?  No         Guardian Notified?  No  [place] - Suicide Severity Rating Scale Completed?  Yes  If yes, what color did the patient score?  White    Patient Report: Initial Complaint: hypotension , increased weakness   Focused Assessment: a fib , SBP low at 65 at PTA, has been mostly in 90 range here occ dip to 80's, has been increasingly weaker, mostly WC bound, 2 person transfer now per daughter was one person.    Tests Performed: labs, ecg, cxr   Abnormal Results: see results   Treatments provided: port accessed,     Family Comments: daughter here     OBS brochure/video discussed/provided to patient/family: No              Name of person given brochure if not patient: na              Relationship to patient: na    ED Medications:   Medications   sodium chloride 0.9% infusion ( Intravenous New Bag 7/3/19 1011)       Drips infusing?:  Yes, tko port     For the " majority of the shift this patient was Green.   Interventions performed were na.    Severe Sepsis OR Septic Shock Diagnosis Present: No    To be done/followed up on inpatient unit:  unknown,     ED NURSE PHONE NUMBER: [phone]

## 2019-07-03 NOTE — PROGRESS NOTES
Service Date: 07/03/2019      REASON FOR CARDIOLOGY VISIT:  Recent hospitalization, atrial fibrillation, history of preserved ejection fraction, congestive heart failure.      HISTORY OF PRESENT ILLNESS:  Ms. Arreola is a very pleasant, 86-year-old female whom I have seen in the Cardiology Clinic for atrial fibrillation on rate control strategy.  She has other comorbidities of multiple myeloma, on chemotherapy.  I saw the patient last time in 04/2018.  Since that time, she has had deterioration of her health.  She was recently admitted in the hospital for weakness and elevated ventricular rates.  As her diltiazem dose was recently decreased from 180-120, this was increased back again to 180, and she was only discharged 2 days ago.  Today she is being brought in the clinic by her daughter and daughter-in-law.  The patient did well on the first day after discharge, but since yesterday she started having more confusion and drowsiness.  In fact, today her blood pressure in the clinic was recorded as 65/47.  This was personally checked by me, and it was 68/46.  She denies any dizziness or lightheadedness, but she is definitely confused and oriented x2 only.  She took diltiazem 180 mg this morning, too.  In addition to diltiazem, she is on metoprolol succinate 50 mg b.i.d.  She is on Lasix 40 mg daily.  She is on Coumadin.  Her chemotherapy has been recently on hold because of deteriorating health.  Echocardiogram done in March this year showed LVEF of 55%-60% with moderate mitral and moderate tricuspid regurgitation with severe pulmonary hypertension.  To be noted, the plan of palliative care review was there, but the clinic appointment was canceled because the patient was in the hospital.  This idea was also broached upon recent hospitalization, but the patient remained full code.  To be noted, the patient is not able to get up from the chair.  Even going to the restroom is making a huge task for the family.  She did  have a bout of diarrhea yesterday.        PHYSICAL EXAMINATION:     VITAL SIGNS:  Blood pressure 65/47, heart rate 74 and irregular.  Unable to take weight as the patient is unable to get up.  Her latest weight was 134 pounds.  Her dry weight is around 128-130 pounds.   GENERAL:  The patient appears quite frail, somewhat somnolent.  Able to answer name and her date of birth, but unable to answer the place.   NECK:  JVP around 10 cm.   CARDIOVASCULAR:  Irregular, normal rate, a grade 2/6 systolic murmur heard over the left lower sternal border, no S3 or S4.   RESPIRATORY:  Decreased air entry on the left side.  To be noted, the patient was diagnosed with left pleural effusion on recent x-ray.   ABDOMEN:  Soft, nontender.   EXTREMITIES:  No pitting pedal edema.   NEUROLOGICAL:  Oriented x2.   HEENT:  No pallor noted.      IMPRESSION AND PLAN:  A very pleasant but frail, 86-year-old female with history of chronic atrial fibrillation on rate control strategy with recent issues with hypotension with higher dose of diltiazem, which was decreased, eventually leading to rapid ventricular rate, and again the dose was increased, now with again hypotension and symptomatic in terms of somnolence with comorbidities of multiple myeloma on chemotherapy, which has been withheld of late.  I had a long discussion with the patient and her family.  She definitely is having symptomatic hypotension.  Unfortunately, she took the morning dose of diltiazem a few hours ago.  Family is also very overwhelmed in terms of her care, as she is not able to even get up from the chair.  At this time, I recommended the patient be admitted to the hospital and will likely need some IV fluids and withholding of her AV solomon-blocking agents.  Since I have seen her about a year ago, there is significant deterioration in her health.  We again broached the topic of palliative care and comfort care.  The patient seems more amenable to it.  We also discussed  about code status.  The patient will think about the code status, but I do feel that it is unlikely that she will be able to have a meaningful neurological recovery in case she has a cardiac arrest and receives CPR.  The patient and her family are very interested in seeing Palliative while they are in the hospital, and I think this should be arranged at this time in the hospital to address overall long-term goals of care as well as code status.   1.  Symptomatic hypotension.  This could be the result of a higher dose of diltiazem in addition to the patient being on a beta blocker.  Other causes cannot be ruled out.  With her frail status and urinary incontinence, I am also worried about the possibility of a UTI.  She needs to be checked with a UA, although no typical dysuria symptoms.   2.  Chronic atrial fibrillation.  She has been on rate control strategy with a combination of beta blocker and calcium channel blocker.  These medications have to be decreased because of hypotension.  One option would be to consider digoxin as the patient is predominantly sedentary, and she should be able to achieve ventricular rate control at rest.  Not a candidate for rhythm control strategy given severely dilated left atrium.   3.  History of preserved ejection fraction and congestive heart failure.  On examination, there is a little bit of elevation of JVP, and she also has a left pleural effusion.  She is on 40 mg of Lasix for now.  I think it is reasonable to continue the same.   4.  Frail status.   5.  Multiple myeloma.      RECOMMENDATIONS:   1.  Given symptomatic hypotension, I think she needs to be admitted to the hospital.  She will likely need some IV fluid resuscitation and also evaluation for any sepsis or infection.   2.  I strongly recommend the patient to be seen by Palliative this hospitalization to discuss goals of care and code status.  I also briefly discussed with the patient about code status as noted above.    3.  We will get the patient admitted to the hospital through the ER, and I will update the ER staff.         MELISSA MONSIVAIS MD             D: 2019   T: 2019   MT: ramses      Name:     ALBERTO PARSON   MRN:      [id number]        Account:      [id number]   :      1932           Service Date: 2019      Document: [id number]

## 2019-07-03 NOTE — PROGRESS NOTES
Brief Cardiology Note  Pt: Amira Arreola    1932      Amira Arreola is a 86 year old female with past medical history significant for HFpEF, HTN, chronic afib, multiple myeloma with mets admitted on 2019 with tachycardia and , dilt had been decreased the week prior in clinic due to hypotension.  Meds were adjusted here and pt bps were stable in the 110-120s, pt felt well,  ntprobnp dropped from 13k on admission to 2.5k today, Cr stable at 0.57, but CXR with large L pleural effusion.  Given she was on room air, and asymptomatic she elected not to tap the effusion.  Pt did well the first 24h home then starting last evening became fatigue and weak.  Went to cardiology clinic, was weak, fatigued, and confused.  Found to be hypotensive at 65/47 and sent to ED.  Unclear how much fluid she received in ED, but getting 50 ml/h now.  Family notes that she has been eating, but very little and was unable to dress herself this am. She did take her medications. They are unclear of prognosis with multiple myeloma.      HR is variable between 60 and 120.  BP improved to 110s, O2 sat 98% on 3L NC.    I agree with holding home po medications.  Can give 2.5 mg IV metoprolol q4h prn for HR >140 or IV dig at 125 mcg if bp is soft.  Effusion likely secondary to malignancy and not chf given lower ntpobnp and worsening in spite of diuresis.  Hospice and or comfort care is reasonable.      Yesi Barnhart PA-C  2:05 PM 7/3/2019   [facility]  Pager: [phone]

## 2019-07-03 NOTE — PROGRESS NOTES
RECEIVING UNIT ED HANDOFF REVIEW    ED Nurse Handoff Report was reviewed by: Riat House on July 3, 2019 at 11:59 AM

## 2019-07-03 NOTE — LETTER
7/3/2019      Addy Frias MD  [address]  [facility] [address]      RE: Amira Roseon       Dear Colleague,    I had the pleasure of seeing Amira Arreola in the [facility].    Service Date: 07/03/2019      REASON FOR CARDIOLOGY VISIT:  Recent hospitalization, atrial fibrillation, history of preserved ejection fraction, congestive heart failure.      HISTORY OF PRESENT ILLNESS:  Ms. Arreola is a very pleasant, 86-year-old female whom I have seen in the Cardiology Clinic for atrial fibrillation on rate control strategy.  She has other comorbidities of multiple myeloma, on chemotherapy.  I saw the patient last time in 04/2018.  Since that time, she has had deterioration of her health.  She was recently admitted in the hospital for weakness and elevated ventricular rates.  As her diltiazem dose was recently decreased from 180-120, this was increased back again to 180, and she was only discharged 2 days ago.  Today she is being brought in the clinic by her daughter and daughter-in-law.  The patient did well on the first day after discharge, but since yesterday she started having more confusion and drowsiness.  In fact, today her blood pressure in the clinic was recorded as 65/47.  This was personally checked by me, and it was 68/46.  She denies any dizziness or lightheadedness, but she is definitely confused and oriented x2 only.  She took diltiazem 180 mg this morning, too.  In addition to diltiazem, she is on metoprolol succinate 50 mg b.i.d.  She is on Lasix 40 mg daily.  She is on Coumadin.  Her chemotherapy has been recently on hold because of deteriorating health.  Echocardiogram done in March this year showed LVEF of 55%-60% with moderate mitral and moderate tricuspid regurgitation with severe pulmonary hypertension.  To be noted, the plan of palliative care review was there, but the clinic appointment was canceled because the patient was in the hospital.  This idea  was also broached upon recent hospitalization, but the patient remained full code.  To be noted, the patient is not able to get up from the chair.  Even going to the restroom is making a huge task for the family.  She did have a bout of diarrhea yesterday.        PHYSICAL EXAMINATION:     VITAL SIGNS:  Blood pressure 65/47, heart rate 74 and regular*** (please check regular or irregular).  Unable to take weight as the patient is unable to get up.  Her latest weight was 134 pounds.  Her dry weight is around 128-130 pounds.   GENERAL:  The patient appears quite frail, somewhat somnolent.  Able to answer name and her date of birth, but unable to answer the place.   NECK:  JVP around 10 cm.   CARDIOVASCULAR:  Irregular*** (please check irregular or regular), normal rate, a grade 2/6 systolic murmur heard over the left lower sternal border, no S3 or S4.   RESPIRATORY:  Decreased air entry on the left side.  To be noted, the patient was diagnosed with left pleural effusion on recent x-ray.   ABDOMEN:  Soft, nontender.   EXTREMITIES:  No pitting pedal edema.   NEUROLOGICAL:  Oriented x2.   HEENT:  No pallor noted.      IMPRESSION AND PLAN:  A very pleasant but frail, 86-year-old female with history of chronic atrial fibrillation on rate control strategy with recent issues with hypotension with higher dose of diltiazem, which was decreased, eventually leading to rapid ventricular rate, and again the dose was increased, now with again hypotension and symptomatic in terms of somnolence with comorbidities of multiple myeloma on chemotherapy, which has been withheld of late.  I had a long discussion with the patient and her family.  She definitely is having symptomatic hypotension.  Unfortunately, she took the morning dose of diltiazem a few hours ago.  Family is also very overwhelmed in terms of her care, as she is not able to even get up from the chair.  At this time, I recommended the patient be admitted to the hospital and  will likely need some IV fluids and withholding of her AV solomon-blocking agents.  Since I have seen her about a year ago, there is significant deterioration in her health.  We again broached the topic of palliative care and comfort care.  The patient seems more amenable to it.  We also discussed about code status.  The patient will think about the code status, but I do feel that it is unlikely that she will be able to have a meaningful neurological recovery in case she has a cardiac arrest and receives CPR.  The patient and her family are very interested in seeing Palliative while they are in the hospital, and I think this should be arranged at this time in the hospital to address overall long-term goals of care as well as code status.   1.  Symptomatic hypotension.  This could be the result of a higher dose of diltiazem in addition to the patient being on a beta blocker.  Other causes cannot be ruled out.  With her frail status and urinary incontinence, I am also worried about the possibility of a UTI.  She needs to be checked with a UA, although no typical dysuria symptoms.   2.  Chronic atrial fibrillation.  She has been on rate control strategy with a combination of beta blocker and calcium channel blocker.  These medications have to be decreased because of hypotension.  One option would be to consider digoxin as the patient is predominantly sedentary, and she should be able to achieve ventricular rate control at rest.  Not a candidate for rhythm control strategy given severely dilated left atrium.   3.  History of preserved ejection fraction and congestive heart failure.  On examination, there is a little bit of elevation of JVP, and she also has a left pleural effusion.  She is on 40 mg of Lasix for now.  I think it is reasonable to continue the same.   4.  Frail status.   5.  Multiple myeloma.      RECOMMENDATIONS:   1.  Given symptomatic hypotension, I think she needs to be admitted to the hospital.  She  will likely need some IV fluid resuscitation and also evaluation for any sepsis or infection.   2.  I strongly recommend the patient to be seen by Palliative this hospitalization to discuss goals of care and code status.  I also briefly discussed with the patient about code status as noted above.   3.  We will get the patient admitted to the hospital through the ER, and I will update the ER staff.         MELISSA MONSIVAIS MD             D: 2019   T: 2019   MT: ramses      Name:     ALBERTO PARSON   MRN:      [id number]        Account:      [id number]   :      1932           Service Date: 2019      Document: [id number]         Outpatient Encounter Medications as of 7/3/2019   Medication Sig Dispense Refill     acetaminophen (TYLENOL) 500 MG tablet Take 500 mg by mouth every 6 hours as needed for mild pain        acyclovir (ZOVIRAX) 400 MG tablet Take 1 tablet (400 mg) by mouth 2 times daily 60 tablet 3     Carboxymethylcellulose Sod PF (REFRESH PLUS) 0.5 % SOLN ophthalmic solution 1 drop 4 times daily as needed for dry eyes       dexamethasone (DECADRON) 4 MG tablet Take 20mg (5 tablets) by mouth every week. 28 tablet 3     latanoprost (XALATAN) 0.005 % ophthalmic solution Place 1 drop into the right eye At Bedtime       lidocaine-prilocaine (EMLA) cream Apply to port site 1 hour prior to access 30 g 1     Multiple Vitamins-Minerals (DAILY MULTIVITAMIN) CAPS Take 1 tablet by mouth daily        polyethylene glycol (MIRALAX/GLYCOLAX) powder Take 17 g by mouth daily as needed for constipation        prochlorperazine (COMPAZINE) 10 MG tablet Take 1 tablet (10 mg) by mouth every 6 hours as needed for nausea or vomiting 30 tablet 1     SIMBRINZA 1-0.2 % ophthalmic suspension Place 1 drop into the right eye 2 times daily 1 drop AM and PM  2     Sodium Fluoride (SF 5000 PLUS) 1.1 % CREA Apply to affected area 3 times daily       warfarin (COUMADIN) 4 MG tablet Take one tablet (4 mg) by mouth on  Tuesdays and Saturdays; take one-half tablet ( 2 mg) on all other days of the week.       [DISCONTINUED] diltiazem ER (CARDIZEM SR) 60 MG 12 hr capsule Take 1 capsule (60 mg) by mouth daily 30 capsule 0     [DISCONTINUED] diltiazem ER (DILT-XR) 120 MG 24 hr capsule Take 1 capsule (120 mg) by mouth daily       [DISCONTINUED] diphenhydrAMINE-acetaminophen (TYLENOL PM)  MG tablet Take 2 tablets by mouth At Bedtime       [DISCONTINUED] furosemide (LASIX) 40 MG tablet Take 1 tablet (40 mg) by mouth daily 60 tablet 1     [DISCONTINUED] metoprolol succinate ER (TOPROL-XL) 50 MG 24 hr tablet Take 3 tablets (150 mg) by mouth 2 times daily 180 tablet 3     [DISCONTINUED] oxyCODONE (ROXICODONE) 5 MG tablet Take 1 tablet (5 mg) by mouth every 4 hours as needed for pain maximum 4 tablet(s) per day 30 tablet 0     [DISCONTINUED] potassium chloride (KLOR-CON) 20 MEQ packet Take 20 mEq by mouth 2 times daily 60 packet 1     [DISCONTINUED] vitamin D3 (VITAMIN D3) 1000 units (25 mcg) tablet Take 1,000 Units by mouth daily       No facility-administered encounter medications on file as of 7/3/2019.        Again, thank you for allowing me to participate in the care of your patient.      Sincerely,    Ramos Rivera MD     [facility]

## 2019-07-03 NOTE — PLAN OF CARE
Patient eat dinner, denies pain, up with 2 assist incontinent of urine, turned every 2 hours. IV fluids is running. Continue to monitor VSS.

## 2019-07-03 NOTE — LETTER
7/3/2019      Addy Frias MD  [address]  [facility] [address]      RE: Amira Roseon       Dear Colleague,    I had the pleasure of seeing Amira Arreola in the [facility].    Service Date: 07/03/2019      REASON FOR CARDIOLOGY VISIT:  Recent hospitalization, atrial fibrillation, history of preserved ejection fraction, congestive heart failure.      HISTORY OF PRESENT ILLNESS:  Ms. Arreola is a very pleasant, 86-year-old female whom I have seen in the Cardiology Clinic for atrial fibrillation on rate control strategy.  She has other comorbidities of multiple myeloma, on chemotherapy.  I saw the patient last time in 04/2018.  Since that time, she has had deterioration of her health.  She was recently admitted in the hospital for weakness and elevated ventricular rates.  As her diltiazem dose was recently decreased from 180-120, this was increased back again to 180, and she was only discharged 2 days ago.  Today she is being brought in the clinic by her daughter and daughter-in-law.  The patient did well on the first day after discharge, but since yesterday she started having more confusion and drowsiness.  In fact, today her blood pressure in the clinic was recorded as 65/47.  This was personally checked by me, and it was 68/46.  She denies any dizziness or lightheadedness, but she is definitely confused and oriented x2 only.  She took diltiazem 180 mg this morning, too.  In addition to diltiazem, she is on metoprolol succinate 50 mg b.i.d.  She is on Lasix 40 mg daily.  She is on Coumadin.  Her chemotherapy has been recently on hold because of deteriorating health.  Echocardiogram done in March this year showed LVEF of 55%-60% with moderate mitral and moderate tricuspid regurgitation with severe pulmonary hypertension.  To be noted, the plan of palliative care review was there, but the clinic appointment was canceled because the patient was in the hospital.  This idea  was also broached upon recent hospitalization, but the patient remained full code.  To be noted, the patient is not able to get up from the chair.  Even going to the restroom is making a huge task for the family.  She did have a bout of diarrhea yesterday.        PHYSICAL EXAMINATION:     VITAL SIGNS:  Blood pressure 65/47, heart rate 74 and irregular.  Unable to take weight as the patient is unable to get up.  Her latest weight was 134 pounds.  Her dry weight is around 128-130 pounds.   GENERAL:  The patient appears quite frail, somewhat somnolent.  Able to answer name and her date of birth, but unable to answer the place.   NECK:  JVP around 10 cm.   CARDIOVASCULAR:  Irregular, normal rate, a grade 2/6 systolic murmur heard over the left lower sternal border, no S3 or S4.   RESPIRATORY:  Decreased air entry on the left side.  To be noted, the patient was diagnosed with left pleural effusion on recent x-ray.   ABDOMEN:  Soft, nontender.   EXTREMITIES:  No pitting pedal edema.   NEUROLOGICAL:  Oriented x2.   HEENT:  No pallor noted.      IMPRESSION AND PLAN:  A very pleasant but frail, 86-year-old female with history of chronic atrial fibrillation on rate control strategy with recent issues with hypotension with higher dose of diltiazem, which was decreased, eventually leading to rapid ventricular rate, and again the dose was increased, now with again hypotension and symptomatic in terms of somnolence with comorbidities of multiple myeloma on chemotherapy, which has been withheld of late.  I had a long discussion with the patient and her family.  She definitely is having symptomatic hypotension.  Unfortunately, she took the morning dose of diltiazem a few hours ago.  Family is also very overwhelmed in terms of her care, as she is not able to even get up from the chair.  At this time, I recommended the patient be admitted to the hospital and will likely need some IV fluids and withholding of her AV solomon-blocking  agents.  Since I have seen her about a year ago, there is significant deterioration in her health.  We again broached the topic of palliative care and comfort care.  The patient seems more amenable to it.  We also discussed about code status.  The patient will think about the code status, but I do feel that it is unlikely that she will be able to have a meaningful neurological recovery in case she has a cardiac arrest and receives CPR.  The patient and her family are very interested in seeing Palliative while they are in the hospital, and I think this should be arranged at this time in the hospital to address overall long-term goals of care as well as code status.   1.  Symptomatic hypotension.  This could be the result of a higher dose of diltiazem in addition to the patient being on a beta blocker.  Other causes cannot be ruled out.  With her frail status and urinary incontinence, I am also worried about the possibility of a UTI.  She needs to be checked with a UA, although no typical dysuria symptoms.   2.  Chronic atrial fibrillation.  She has been on rate control strategy with a combination of beta blocker and calcium channel blocker.  These medications have to be decreased because of hypotension.  One option would be to consider digoxin as the patient is predominantly sedentary, and she should be able to achieve ventricular rate control at rest.  Not a candidate for rhythm control strategy given severely dilated left atrium.   3.  History of preserved ejection fraction and congestive heart failure.  On examination, there is a little bit of elevation of JVP, and she also has a left pleural effusion.  She is on 40 mg of Lasix for now.  I think it is reasonable to continue the same.   4.  Frail status.   5.  Multiple myeloma.      RECOMMENDATIONS:   1.  Given symptomatic hypotension, I think she needs to be admitted to the hospital.  She will likely need some IV fluid resuscitation and also evaluation for any  sepsis or infection.   2.  I strongly recommend the patient to be seen by Palliative this hospitalization to discuss goals of care and code status.  I also briefly discussed with the patient about code status as noted above.   3.  We will get the patient admitted to the hospital through the ER, and I will update the ER staff.         MELISSA MONSIVAIS MD             D: 2019   T: 2019   MT: [email or web address]      Name:     ALBERTO PARSON   MRN:      [id number]        Account:      [id number]   :      1932           Service Date: 2019      Document: [id number]           Outpatient Encounter Medications as of 7/3/2019   Medication Sig Dispense Refill     acetaminophen (TYLENOL) 500 MG tablet Take 500 mg by mouth every 6 hours as needed for mild pain        acyclovir (ZOVIRAX) 400 MG tablet Take 1 tablet (400 mg) by mouth 2 times daily 60 tablet 3     Carboxymethylcellulose Sod PF (REFRESH PLUS) 0.5 % SOLN ophthalmic solution 1 drop 4 times daily as needed for dry eyes       dexamethasone (DECADRON) 4 MG tablet Take 20mg (5 tablets) by mouth every week. 28 tablet 3     latanoprost (XALATAN) 0.005 % ophthalmic solution Place 1 drop into the right eye At Bedtime       lidocaine-prilocaine (EMLA) cream Apply to port site 1 hour prior to access 30 g 1     Multiple Vitamins-Minerals (DAILY MULTIVITAMIN) CAPS Take 1 tablet by mouth daily        polyethylene glycol (MIRALAX/GLYCOLAX) powder Take 17 g by mouth daily as needed for constipation        prochlorperazine (COMPAZINE) 10 MG tablet Take 1 tablet (10 mg) by mouth every 6 hours as needed for nausea or vomiting 30 tablet 1     SIMBRINZA 1-0.2 % ophthalmic suspension Place 1 drop into the right eye 2 times daily 1 drop AM and PM  2     Sodium Fluoride (SF 5000 PLUS) 1.1 % CREA Apply to affected area 3 times daily       warfarin (COUMADIN) 4 MG tablet Take one tablet (4 mg) by mouth on ,   and ; take one-half tablet ( 2 mg) on all other  days of the week.       [DISCONTINUED] diltiazem ER (CARDIZEM SR) 60 MG 12 hr capsule Take 1 capsule (60 mg) by mouth daily 30 capsule 0     [DISCONTINUED] diltiazem ER (DILT-XR) 120 MG 24 hr capsule Take 1 capsule (120 mg) by mouth daily       [DISCONTINUED] diphenhydrAMINE-acetaminophen (TYLENOL PM)  MG tablet Take 2 tablets by mouth At Bedtime       [DISCONTINUED] furosemide (LASIX) 40 MG tablet Take 1 tablet (40 mg) by mouth daily 60 tablet 1     [DISCONTINUED] metoprolol succinate ER (TOPROL-XL) 50 MG 24 hr tablet Take 3 tablets (150 mg) by mouth 2 times daily 180 tablet 3     [DISCONTINUED] oxyCODONE (ROXICODONE) 5 MG tablet Take 1 tablet (5 mg) by mouth every 4 hours as needed for pain maximum 4 tablet(s) per day 30 tablet 0     [DISCONTINUED] potassium chloride (KLOR-CON) 20 MEQ packet Take 20 mEq by mouth 2 times daily 60 packet 1     [DISCONTINUED] vitamin D3 (VITAMIN D3) 1000 units (25 mcg) tablet Take 1,000 Units by mouth daily       No facility-administered encounter medications on file as of 7/3/2019.        Again, thank you for allowing me to participate in the care of your patient.      Sincerely,    Ramos Rivera MD     [facility]

## 2019-07-03 NOTE — PROGRESS NOTES
"Clinic Care Coordination Contact  Care Coordination Communication    Clinical Data: Patient was hospitalized at [facility] from 6/28/19 to 7/01/19 with diagnoses of:    Atrial fibrillation with RVR  Acute on chronic diastolic heart failure  Bilateral Pleural Effusions  Fatigue, weakness  Metastatic Multiple Myeloma    Per chart review, patient was re-hospitalized at [facility] today, 7/3/19. The [facility] H&P from today states (in part): \"Amira Arreola is a 86 year old female admitted on 7/3/2019. She presents from cardiology clinic where she had scheduled follow-up.  Found to be hypotensive 60s/40s, with altered mental status...\".    Plan: -CCC will follow for possible need for community resources post hospitalization.      ADAN López  [facility] Care Coordination  Clinic: [facility]   Email: [email or web address]  Tele: [phone]              "

## 2019-07-03 NOTE — CONSULTS
[facility]    Palliative Care Consultation     Amira Arreola  MRN# [id number]  Date of Admission:  7/3/2019  Date of Service (when I saw the patient): 07/03/19  Reason for consult: Consulted by DAYSI Barkley CNP for Goals of care    Assessment & Plan   Amira Arreola is a 86 year old female with PMH significant for heart failure, chronic atrial fibrillation anticoagulated on warfarin, thrombocytopenia, multiple myeloma, pulmonary hypertension, SVT, cognitive decline over past months as well as progressive fatigue and weakness who was just hospitalized (6/28-7/1) for weakness and fatigue and found to be in RVR, now presents 2 days after discharge for a scheduled cardiology appointment and was found to have significant hypotension and alerted mental status as well as ongoing lethargy and weakness and was admitted for further work up.       Discussed our potential roles for symptom management, support/coping, and decisional support (aka goals of care).     Goals of care discussions:   Palliative care was consulted for goals of care and I met with the family members that were present but they are still waiting on several family members coming from out of town before making any decisions regarding goals of care.  I met in Amira's room (she was too somnolent to participate in the discussion), her , 2 daughters, 1 son, one daughter-in-law and two granddaughters.  The family had had a discussion earlier in the day with DAYSI Barkley but were appreciative to again discuss Amira's situation with me.  I reviewed Amira's medical history with the family including her heart failure, atrial fibrillation, recent admissions for RVR, her multiple myeloma history, and talked about how over the past 1 to 2 months, Amira has had progressive fatigue, weakness and confusion.  At this point, Amira's  interrupted to say that he did not know Amira had heart failure and he felt that  her multiple myeloma was responding well to chemotherapy. He was very focused on the fact that Amira's weakness and fatigue got much worse after stopping chemotherapy and therefore he wanted to know if it was possible that restarting the chemotherapy for her MM could help her get better and he wanted data showing whether it would help or hurt her. He believes that Amira chose to stop the chemotherapy because she was feeling tired and he did not believe that Dr. Roberts thought the chemotherapy could be causing the fatigue.  I explained that according to Dr. Roberts's notes, he was worried that although the chemotherapy seem to be helping the cancer, it could be worsening her fatigue and making her sicker.    I explained that unfortunately I felt Amira was in a very different place now than she was at the beginning of June, a month ago.  I thought that now she was much sicker and I did not believe she would ever be able to get back to where she was only weeks ago and I felt time was very limited for her.    I told the family that there were two paths moving forward forward for Amira. One option was to do more testing to try and figure out why she was so weak and fatigued and confused and then try and treat anything we may find with the goal of helping her get stronger and giving her more time.  The other option was to focus on her comfort and helping her feel as good as possible for whatever time she had left.  I explained that this type of care is consistent with hospice care.    I answered all of the family's questions and explained that I would return on Friday.  Family appreciated my visit and plan to meet with the entire family and the hospitalist tomorrow to continue these discussions.    Symptom Recommendations: No symptom recommendations at this time    Support/Coping  - Large extended family present and supporting patient.   - Will involve  on call    Decisional Support, Goals of Care, Counseling  & Coordination  Decisional Capacity Intact?  -no  Health Care Directive on File?  -yes   Code Status/Resuscitation Preferences?  -DNR/DNI - As per family discussion with DAYSI Barkley      Thank you for involving us in the care of this patient and family. We will continue to follow at this time. Please do not hesitate to contact me with questions or concerns or the on-call provider for our team if evening or weekend.    Cintia Langstonshira  Palliative Care Physician  Pager [phone]      Attestation:  Total time on the floor involved in the patient's care: 80 minutes  Total time spent in counseling/care coordination: >50%    Chief Complaint   Goals of care    History is obtained from the patient, , daughters, son, daughter-in-law and extensive chart review.     Past Medical History    I have reviewed this patient's medical history and updated it with pertinent information if needed.   Past Medical History:   Diagnosis Date     Abnormal CXR 2018    then ct done and not significant     Acute diastolic heart failure (H) 03/22/2019    nl ef, 2+mr and tr with severe pulm htn     Ascending aorta dilatation (H) 04/2016    on echo, mild, fu 7/18 4.0, slightly larger     Cancer, metastatic to bone (H)     due to myeloma     Colonic polyp 2008    adenomatous, fu 2013 tics only     Compression fracture 2016    multiple areas of spine     Dry eyes      Elevated MCV 2015    b12 and folic acid nl     HTN (hypertension) 2000    off meds for years     Lung nodule 08/2018    on ct, 4mm, ct done for fu abnl cxr     Menorrhagia 2002    hysteroscopy and d and c done     MGUS (monoclonal gammopathy of unknown significance) 2015    eval by Dr. Roberts     Multiple myeloma (H) 2016    dx 5/16 at [facility], bone lesions seen on mri 6/16     OAB (overactive bladder) 2013    Dr. Grullon     Osteoporosis     fu done 2010 and stable, went off meds then, fu done 2013; has had gyn fu and added evista 2013 by gyn     Palpitations      nl echo, mildly dilated asc aorta     Paroxysmal atrial fibrillation (H)     had palp and ziopatch showed it, echo nl lv fxn, mild mr and tr, added coum and toprol, toprol dose raised 16     Pulmonary hypertension (H) 2019    seen on echo     Sciatica of left side     Dr. Helen Ricardo      SVT (supraventricular tachycardia) (H)     on ziopatch     Thrombocytopenia (H)        Past Surgical History   I have reviewed this patient's surgical history and updated it with pertinent information if needed.  Past Surgical History:   Procedure Laterality Date     BONE MARROW BIOPSY, BONE SPECIMEN, NEEDLE/TROCAR N/A 2016    Procedure: BIOPSY BONE MARROW;  Surgeon: Bryan Patel MD;  Location:  GI     CATARACT IOL, RT/LT        SECTION  ,      COLONOSCOPY  2013    Procedure: COLONOSCOPY;  COLONOSCOPY;  Surgeon: Steffany Rockwell MD;  Location:  GI     EXCISE EXOSTOSIS TIBIA / FIBULA  2014    Procedure: EXCISE EXOSTOSIS TIBIA / FIBULA;  Surgeon: Naila Pichardo MD;  Location:  [facility]     hysteroscopy and d and c      due to bleeding     left anle replacement       right ankle surgery         Social History   Living situation: Amira has been living with her  but over past month requiring increasing help from her daughters     Family system: 3 daughters and 3 sons,     Self-identified support system: above    Employment/education: not addressed today    P[employer]al affiliation / involvement in [facility]ir[employer] community: Denominational and would appreciate  visit.       Family History   I have reviewed this patient's family history and updated it with pertinent information if needed.   Family History   Problem Relation Age of Onset     Heart Disease Father      C.A.D. Mother      Cerebrovascular Disease Brother      Family History Negative Sister      Family History Negative Sister      Family History Negative  Brother        Allergies   Allergies   Allergen Reactions     Blood Transfusion Related (Informational Only)      Blood antigens related to receiving Darzelex-AG     Penicillin [Penicillins] Rash     Blotches on chest        Medications   I have reviewed all medications    Review of Systems   The comprehensive review of systems is negative other than noted here and in the assessment/plan.    Palliative Symptom Review (0=no symptom/no concern, 1=mild, 2=moderate, 3=severe):  Pain: 0  Fatigue: 3  Nausea: 0  Drowsiness: 2  Poor Appetite: 3  Shortness of Breath: 0    Physical Exam   Temp: 97.5  F (36.4  C) Temp src: Oral BP: 112/59 Pulse: 92 Heart Rate: 122 Resp: 14 SpO2: 98 % O2 Device: Nasal cannula Oxygen Delivery: 3 LPM  Vitals:    07/03/19 0929 07/03/19 1322   Weight: 56.7 kg (125 lb) 55.7 kg (122 lb 14.4 oz)     General: sleeping in bed for majority of encounter, appears stated age, comfortable, in NAD  Eyes: EOMI, sclera clear  HEENT: NC/AT; mucous membranes dry  Lungs: no increased work of breathing  Neuro:oriented to person, thought she was in [facility], did not know she was in the hospital, not oriented to time; CN II-XII grossly intact;  Neuropsych: woke up toward end of encounter, able to answer ROS questions, not oriented to place and could not understand why she was wearing nasal cannula or O2 sat monitor, sensorium not intact    Data   Results for orders placed or performed during the hospital encounter of 07/03/19 (from the past 24 hour(s))   INR   Result Value Ref Range    INR 2.17 (H) 0.86 - 1.14   Lactic acid whole blood   Result Value Ref Range    Lactic Acid 1.6 0.7 - 2.0 mmol/L   EKG 12 lead   Result Value Ref Range    Interpretation ECG Click View Image link to view waveform and result    CBC with platelets differential   Result Value Ref Range    WBC 9.4 4.0 - 11.0 10e9/L    RBC Count 4.17 3.8 - 5.2 10e12/L    Hemoglobin 13.2 11.7 - 15.7 g/dL    Hematocrit 40.0 35.0 - 47.0 %    MCV 96 78 -  100 fl    MCH 31.7 26.5 - 33.0 pg    MCHC 33.0 31.5 - 36.5 g/dL    RDW 18.3 (H) 10.0 - 15.0 %    Platelet Count 208 150 - 450 10e9/L    Diff Method Automated Method     % Neutrophils 88.4 %    % Lymphocytes 4.5 %    % Monocytes 5.9 %    % Eosinophils 0.2 %    % Basophils 0.2 %    % Immature Granulocytes 0.8 %    Nucleated RBCs 0 0 /100    Absolute Neutrophil 8.3 1.6 - 8.3 10e9/L    Absolute Lymphocytes 0.4 (L) 0.8 - 5.3 10e9/L    Absolute Monocytes 0.6 0.0 - 1.3 10e9/L    Absolute Eosinophils 0.0 0.0 - 0.7 10e9/L    Absolute Basophils 0.0 0.0 - 0.2 10e9/L    Abs Immature Granulocytes 0.1 0 - 0.4 10e9/L    Absolute Nucleated RBC 0.0    Basic metabolic panel   Result Value Ref Range    Sodium 139 133 - 144 mmol/L    Potassium 4.7 3.4 - 5.3 mmol/L    Chloride 105 94 - 109 mmol/L    Carbon Dioxide 27 20 - 32 mmol/L    Anion Gap 7 3 - 14 mmol/L    Glucose 126 (H) 70 - 99 mg/dL    Urea Nitrogen 19 7 - 30 mg/dL    Creatinine 0.70 0.52 - 1.04 mg/dL    GFR Estimate 78 >60 mL/min/[1.73_m2]    GFR Estimate If Black >90 >60 mL/min/[1.73_m2]    Calcium 9.7 8.5 - 10.1 mg/dL   BNP   Result Value Ref Range    N-Terminal Pro BNP Inpatient 5,903 (H) 0 - 1,800 pg/mL   Troponin I (now)   Result Value Ref Range    Troponin I ES <0.015 0.000 - 0.045 ug/L   UA with Microscopic reflex to Culture   Result Value Ref Range    Color Urine Yellow     Appearance Urine Clear     Glucose Urine Negative NEG^Negative mg/dL    Bilirubin Urine Negative NEG^Negative    Ketones Urine Negative NEG^Negative mg/dL    Specific Gravity Urine 1.008 1.003 - 1.035    Blood Urine Negative NEG^Negative    pH Urine 7.0 5.0 - 7.0 pH    Protein Albumin Urine Negative NEG^Negative mg/dL    Urobilinogen mg/dL Normal 0.0 - 2.0 mg/dL    Nitrite Urine Negative NEG^Negative    Leukocyte Esterase Urine Negative NEG^Negative    Source Catheterized Urine     WBC Urine 2 0 - 5 /HPF    RBC Urine <1 0 - 2 /HPF    Bacteria Urine Few (A) NEG^Negative /HPF    Squamous Epithelial  /HPF Urine 3 (H) 0 - 1 /HPF    Mucous Urine Present (A) NEG^Negative /LPF    Hyaline Casts 10 (H) 0 - 2 /LPF   Chest XR,  PA & LAT    Narrative    CHEST TWO VIEWS July 3, 2019 10:01 AM     HISTORY: Check for pulmonary edema or pneumonia, hypotension and  weakness.      Impression    IMPRESSION: Left basilar opacity compatible with a combination of  pleural effusion and parenchymal opacity, possibly unchanged compared  to 7/1/2019 given differences in positioning. Small right pleural  effusion and parenchymal opacity is also suspected. Mid and lower  thoracic spinal compression fractures are noted.   Blood culture   Result Value Ref Range    Specimen Description Blood Port     Special Requests Aerobic and anaerobic bottles received     Culture Micro PENDING    Troponin I - Now then in 6 hours x 2   Result Value Ref Range    Troponin I ES <0.015 0.000 - 0.045 ug/L

## 2019-07-03 NOTE — LETTER
7/3/2019    Addy Frias MD  [address]  [place] [address]    RE: Amira Arreola       Dear Colleague,    I had the pleasure of seeing Amira Arreola in the [facility].    HPI and Plan:   See dictation(#[id number])    No orders of the defined types were placed in this encounter.      No orders of the defined types were placed in this encounter.      There are no discontinued medications.      Encounter Diagnoses   Name Primary?     Chronic heart failure with preserved ejection fraction (H)      Paroxysmal atrial fibrillation (H)        CURRENT MEDICATIONS:  Current Outpatient Medications   Medication Sig Dispense Refill     acetaminophen (TYLENOL) 500 MG tablet Take 500 mg by mouth every 6 hours as needed for mild pain        acyclovir (ZOVIRAX) 400 MG tablet Take 1 tablet (400 mg) by mouth 2 times daily 60 tablet 3     Carboxymethylcellulose Sod PF (REFRESH PLUS) 0.5 % SOLN ophthalmic solution 1 drop 4 times daily as needed for dry eyes       dexamethasone (DECADRON) 4 MG tablet Take 20mg (5 tablets) by mouth every week. 28 tablet 3     diltiazem ER (CARDIZEM SR) 60 MG 12 hr capsule Take 1 capsule (60 mg) by mouth daily 30 capsule 0     diltiazem ER (DILT-XR) 120 MG 24 hr capsule Take 1 capsule (120 mg) by mouth daily       diphenhydrAMINE-acetaminophen (TYLENOL PM)  MG tablet Take 2 tablets by mouth At Bedtime       furosemide (LASIX) 40 MG tablet Take 1 tablet (40 mg) by mouth daily 60 tablet 1     latanoprost (XALATAN) 0.005 % ophthalmic solution Place 1 drop into the right eye At Bedtime       lidocaine-prilocaine (EMLA) cream Apply to port site 1 hour prior to access 30 g 1     metoprolol succinate ER (TOPROL-XL) 50 MG 24 hr tablet Take 3 tablets (150 mg) by mouth 2 times daily 180 tablet 3     Multiple Vitamins-Minerals (DAILY MULTIVITAMIN) CAPS Take 1 tablet by mouth daily        oxyCODONE (ROXICODONE) 5 MG tablet Take 1 tablet (5 mg) by mouth every 4 hours  as needed for pain maximum 4 tablet(s) per day 30 tablet 0     polyethylene glycol (MIRALAX/GLYCOLAX) powder Take 17 g by mouth daily as needed for constipation        potassium chloride (KLOR-CON) 20 MEQ packet Take 20 mEq by mouth 2 times daily 60 packet 1     prochlorperazine (COMPAZINE) 10 MG tablet Take 1 tablet (10 mg) by mouth every 6 hours as needed for nausea or vomiting 30 tablet 1     SIMBRINZA 1-0.2 % ophthalmic suspension Place 1 drop into the right eye 2 times daily 1 drop AM and PM  2     Sodium Fluoride (SF 5000 PLUS) 1.1 % CREA Apply to affected area 3 times daily       vitamin D3 (VITAMIN D3) 1000 units (25 mcg) tablet Take 1,000 Units by mouth daily       warfarin (COUMADIN) 4 MG tablet Take one tablet (4 mg) by mouth on Tuesdays and Saturdays; take one-half tablet ( 2 mg) on all other days of the week.         ALLERGIES     Allergies   Allergen Reactions     Blood Transfusion Related (Informational Only)      Blood antigens related to receiving Darzelex-AG     Penicillin [Penicillins] Rash     Blotches on chest        PAST MEDICAL HISTORY:  Past Medical History:   Diagnosis Date     Abnormal CXR 2018    then ct done and not significant     Acute diastolic heart failure (H) 03/22/2019    nl ef, 2+mr and tr with severe pulm htn     Ascending aorta dilatation (H) 04/2016    on echo, mild, fu 7/18 4.0, slightly larger     Cancer, metastatic to bone (H)     due to myeloma     Colonic polyp 2008    adenomatous, fu 2013 tics only     Compression fracture 2016    multiple areas of spine     Dry eyes      Elevated MCV 2015    b12 and folic acid nl     HTN (hypertension) 2000    off meds for years     Lung nodule 08/2018    on ct, 4mm, ct done for fu abnl cxr     Menorrhagia 2002    hysteroscopy and d and c done     MGUS (monoclonal gammopathy of unknown significance) 2015    eval by Dr. Roberts     Multiple myeloma (H) 2016    dx 5/16 at [facility], bone lesions seen on mri 6/16     OAB (overactive bladder) 2013     Dr. Grullon     Osteoporosis     fu done  and stable, went off meds then, fu done ; has had gyn fu and added evista  by gyn     Palpitations     nl echo, mildly dilated asc aorta     Paroxysmal atrial fibrillation (H)     had palp and ziopatch showed it, echo nl lv fxn, mild mr and tr, added coum and toprol, toprol dose raised 16     Pulmonary hypertension (H) 2019    seen on echo     Sciatica of left side     Dr. Helen Ricardo      SVT (supraventricular tachycardia) (H)     on ziopatch     Thrombocytopenia (H)        PAST SURGICAL HISTORY:  Past Surgical History:   Procedure Laterality Date     BONE MARROW BIOPSY, BONE SPECIMEN, NEEDLE/TROCAR N/A 2016    Procedure: BIOPSY BONE MARROW;  Surgeon: Bryan Patel MD;  Location:  GI     CATARACT IOL, RT/LT        SECTION  1965, 1966     COLONOSCOPY  2013    Procedure: COLONOSCOPY;  COLONOSCOPY;  Surgeon: Steffany Rockwell MD;  Location:  GI     EXCISE EXOSTOSIS TIBIA / FIBULA  2014    Procedure: EXCISE EXOSTOSIS TIBIA / FIBULA;  Surgeon: Naila Pichardo MD;  Location:  SD     hysteroscopy and d and c      due to bleeding     left anle replacement       right ankle surgery         FAMILY HISTORY:  Family History   Problem Relation Age of Onset     Heart Disease Father      C.A.D. Mother      Cerebrovascular Disease Brother      Family History Negative Sister      Family History Negative Sister      Family History Negative Brother        SOCIAL HISTORY:  Social History     Socioeconomic History     Marital status:      Spouse name: Tom     Number of children: 6     Years of education: None     Highest education level: None   Occupational History     Occupation: rn anesthetist     Employer: RETIRED   Social Needs     Financial resource strain: None     Food insecurity:     Worry: None     Inability: None     Transportation needs:     Medical:  "None     Non-medical: None   Tobacco Use     Smoking status: Never Smoker     Smokeless tobacco: Never Used   Substance and Sexual Activity     Alcohol use: No     Alcohol/week: 0.0 oz     Drug use: No     Sexual activity: Never   Lifestyle     Physical activity:     Days per week: None     Minutes per session: None     Stress: None   Relationships     Social connections:     Talks on phone: None     Gets together: None     Attends [employer] service: None     Active member of club or organization: None     Attends meetings of clubs or organizations: None     Relationship status: None     Intimate partner violence:     Fear of current or ex partner: None     Emotionally abused: None     Physically abused: None     Forced sexual activity: None   Other Topics Concern     Parent/sibling w/ CABG, MI or angioplasty before 65F 55M? Not Asked   Social History Narrative     None       Review of Systems:  Skin:  Positive for bruising     Eyes:  Positive for glaucoma    ENT:  Negative      Respiratory:  Positive for cough     Cardiovascular:    Positive for;fatigue    Gastroenterology: Negative      Genitourinary:  Negative      Musculoskeletal:  Positive for arthritis    Neurologic:  Positive for local weakness;incoordination    Psychiatric:  Negative      Heme/Lymph/Imm:  Positive for allergies    Endocrine:  Negative        Physical Exam:  Vitals: BP (!) 65/47   Pulse 74   Ht 1.651 m (5' 5\")   SpO2 93%   BMI 21.62 kg/m       Constitutional:           Skin:             Head:           Eyes:           Lymph:      ENT:           Neck:           Respiratory:            Cardiac:                                                           GI:           Extremities and Muscular Skeletal:                 Neurological:           Psych:             CC  Addy Frias MD  [address] [facility] [address]  [place] [address]                    Thank you for allowing me to participate in the care of your patient.      Sincerely, "     Ramos Rivera MD     [facility]    cc:   Addy Frias MD  [address] [address]

## 2019-07-03 NOTE — CONSULTS
Oncology:    Patient has relapsed multiple myeloma sine 2015. Currently admitted with cardiac issues and performance status of 4.   Detailed discussion with family/. I don't see chances of meaningful recovery. Hospice enrollment is recommended.     Total time 20 minutes,all of which were spent in counselling.

## 2019-07-03 NOTE — LETTER
7/3/2019      Addy Frias MD  [address]  [facility] [address]      RE: Amira Roseon       Dear Colleague,    I had the pleasure of seeing Amira Arreola in the [facility].    Service Date: 07/03/2019      REASON FOR CARDIOLOGY VISIT:  Recent hospitalization, atrial fibrillation, history of preserved ejection fraction, congestive heart failure.      HISTORY OF PRESENT ILLNESS:  Ms. Arreola is a very pleasant, 86-year-old female whom I have seen in the Cardiology Clinic for atrial fibrillation on rate control strategy.  She has other comorbidities of multiple myeloma, on chemotherapy.  I saw the patient last time in 04/2018.  Since that time, she has had deterioration of her health.  She was recently admitted in the hospital for weakness and elevated ventricular rates.  As her diltiazem dose was recently decreased from 180-120, this was increased back again to 180, and she was only discharged 2 days ago.  Today she is being brought in the clinic by her daughter and daughter-in-law.  The patient did well on the first day after discharge, but since yesterday she started having more confusion and drowsiness.  In fact, today her blood pressure in the clinic was recorded as 65/47.  This was personally checked by me, and it was 68/46.  She denies any dizziness or lightheadedness, but she is definitely confused and oriented x2 only.  She took diltiazem 180 mg this morning, too.  In addition to diltiazem, she is on metoprolol succinate 50 mg b.i.d.  She is on Lasix 40 mg daily.  She is on Coumadin.  Her chemotherapy has been recently on hold because of deteriorating health.  Echocardiogram done in March this year showed LVEF of 55%-60% with moderate mitral and moderate tricuspid regurgitation with severe pulmonary hypertension.  To be noted, the plan of palliative care review was there, but the clinic appointment was canceled because the patient was in the hospital.  This idea  was also broached upon recent hospitalization, but the patient remained full code.  To be noted, the patient is not able to get up from the chair.  Even going to the restroom is making a huge task for the family.  She did have a bout of diarrhea yesterday.        PHYSICAL EXAMINATION:     VITAL SIGNS:  Blood pressure 65/47, heart rate 74 and regular*** (please check regular or irregular).  Unable to take weight as the patient is unable to get up.  Her latest weight was 134 pounds.  Her dry weight is around 128-130 pounds.   GENERAL:  The patient appears quite frail, somewhat somnolent.  Able to answer name and her date of birth, but unable to answer the place.   NECK:  JVP around 10 cm.   CARDIOVASCULAR:  Irregular*** (please check irregular or regular), normal rate, a grade 2/6 systolic murmur heard over the left lower sternal border, no S3 or S4.   RESPIRATORY:  Decreased air entry on the left side.  To be noted, the patient was diagnosed with left pleural effusion on recent x-ray.   ABDOMEN:  Soft, nontender.   EXTREMITIES:  No pitting pedal edema.   NEUROLOGICAL:  Oriented x2.   HEENT:  No pallor noted.      IMPRESSION AND PLAN:  A very pleasant but frail, 86-year-old female with history of chronic atrial fibrillation on rate control strategy with recent issues with hypotension with higher dose of diltiazem, which was decreased, eventually leading to rapid ventricular rate, and again the dose was increased, now with again hypotension and symptomatic in terms of somnolence with comorbidities of multiple myeloma on chemotherapy, which has been withheld of late.  I had a long discussion with the patient and her family.  She definitely is having symptomatic hypotension.  Unfortunately, she took the morning dose of diltiazem a few hours ago.  Family is also very overwhelmed in terms of her care, as she is not able to even get up from the chair.  At this time, I recommended the patient be admitted to the hospital and  will likely need some IV fluids and withholding of her AV solomon-blocking agents.  Since I have seen her about a year ago, there is significant deterioration in her health.  We again broached the topic of palliative care and comfort care.  The patient seems more amenable to it.  We also discussed about code status.  The patient will think about the code status, but I do feel that it is unlikely that she will be able to have a meaningful neurological recovery in case she has a cardiac arrest and receives CPR.  The patient and her family are very interested in seeing Palliative while they are in the hospital, and I think this should be arranged at this time in the hospital to address overall long-term goals of care as well as code status.   1.  Symptomatic hypotension.  This could be the result of a higher dose of diltiazem in addition to the patient being on a beta blocker.  Other causes cannot be ruled out.  With her frail status and urinary incontinence, I am also worried about the possibility of a UTI.  She needs to be checked with a UA, although no typical dysuria symptoms.   2.  Chronic atrial fibrillation.  She has been on rate control strategy with a combination of beta blocker and calcium channel blocker.  These medications have to be decreased because of hypotension.  One option would be to consider digoxin as the patient is predominantly sedentary, and she should be able to achieve ventricular rate control at rest.  Not a candidate for rhythm control strategy given severely dilated left atrium.   3.  History of preserved ejection fraction and congestive heart failure.  On examination, there is a little bit of elevation of JVP, and she also has a left pleural effusion.  She is on 40 mg of Lasix for now.  I think it is reasonable to continue the same.   4.  Frail status.   5.  Multiple myeloma.      RECOMMENDATIONS:   1.  Given symptomatic hypotension, I think she needs to be admitted to the hospital.  She  will likely need some IV fluid resuscitation and also evaluation for any sepsis or infection.   2.  I strongly recommend the patient to be seen by Palliative this hospitalization to discuss goals of care and code status.  I also briefly discussed with the patient about code status as noted above.   3.  We will get the patient admitted to the hospital through the ER, and I will update the ER staff.         MELISSA MONSIVAIS MD             D: 2019   T: 2019   MT: [email or web address]      Name:     ALBERTO PRASON   MRN:      [id number]        Account:      [id number]   :      1932           Service Date: 2019      Document: [id number]         Outpatient Encounter Medications as of 7/3/2019   Medication Sig Dispense Refill     acetaminophen (TYLENOL) 500 MG tablet Take 500 mg by mouth every 6 hours as needed for mild pain        acyclovir (ZOVIRAX) 400 MG tablet Take 1 tablet (400 mg) by mouth 2 times daily 60 tablet 3     Carboxymethylcellulose Sod PF (REFRESH PLUS) 0.5 % SOLN ophthalmic solution 1 drop 4 times daily as needed for dry eyes       dexamethasone (DECADRON) 4 MG tablet Take 20mg (5 tablets) by mouth every week. 28 tablet 3     latanoprost (XALATAN) 0.005 % ophthalmic solution Place 1 drop into the right eye At Bedtime       lidocaine-prilocaine (EMLA) cream Apply to port site 1 hour prior to access 30 g 1     Multiple Vitamins-Minerals (DAILY MULTIVITAMIN) CAPS Take 1 tablet by mouth daily        polyethylene glycol (MIRALAX/GLYCOLAX) powder Take 17 g by mouth daily as needed for constipation        prochlorperazine (COMPAZINE) 10 MG tablet Take 1 tablet (10 mg) by mouth every 6 hours as needed for nausea or vomiting 30 tablet 1     SIMBRINZA 1-0.2 % ophthalmic suspension Place 1 drop into the right eye 2 times daily 1 drop AM and PM  2     Sodium Fluoride (SF 5000 PLUS) 1.1 % CREA Apply to affected area 3 times daily       warfarin (COUMADIN) 4 MG tablet Take one tablet (4 mg) by mouth on  Tuesdays and Saturdays; take one-half tablet ( 2 mg) on all other days of the week.       [DISCONTINUED] diltiazem ER (CARDIZEM SR) 60 MG 12 hr capsule Take 1 capsule (60 mg) by mouth daily 30 capsule 0     [DISCONTINUED] diltiazem ER (DILT-XR) 120 MG 24 hr capsule Take 1 capsule (120 mg) by mouth daily       [DISCONTINUED] diphenhydrAMINE-acetaminophen (TYLENOL PM)  MG tablet Take 2 tablets by mouth At Bedtime       [DISCONTINUED] furosemide (LASIX) 40 MG tablet Take 1 tablet (40 mg) by mouth daily 60 tablet 1     [DISCONTINUED] metoprolol succinate ER (TOPROL-XL) 50 MG 24 hr tablet Take 3 tablets (150 mg) by mouth 2 times daily 180 tablet 3     [DISCONTINUED] oxyCODONE (ROXICODONE) 5 MG tablet Take 1 tablet (5 mg) by mouth every 4 hours as needed for pain maximum 4 tablet(s) per day 30 tablet 0     [DISCONTINUED] potassium chloride (KLOR-CON) 20 MEQ packet Take 20 mEq by mouth 2 times daily 60 packet 1     [DISCONTINUED] vitamin D3 (VITAMIN D3) 1000 units (25 mcg) tablet Take 1,000 Units by mouth daily       No facility-administered encounter medications on file as of 7/3/2019.        Again, thank you for allowing me to participate in the care of your patient.      Sincerely,    Ramos Rivera MD     [facility]

## 2019-07-03 NOTE — PROGRESS NOTES
SPIRITUAL HEALTH SERVICES Progress Note  [facility]    Pt and family were both present and smiled at presence of SH. SH provided socialization to family and reflective conversation. Pt and family expressed strong hiram and requested prayer. SH provided a prayer. Pt and family expressed gratitude at presence of SH.      Evan Meester   Intern  Pager:  [phone]

## 2019-07-03 NOTE — PHARMACY-ADMISSION MEDICATION HISTORY
Admission medication history interview status for the 7/3/2019  admission is complete. See EPIC admission navigator for prior to admission medications     Medication history source reliability:Good    Actions taken by pharmacist (provider contacted, etc):called her daughter/kelly     Additional medication history information not noted on PTA med list :Diltiazem    Medication reconciliation/reorder completed by provider prior to medication history? No    Time spent in this activity: 20min    Prior to Admission medications    Medication Sig Last Dose Taking? Auth Provider   acetaminophen (TYLENOL) 500 MG tablet Take 500 mg by mouth every 6 hours as needed for mild pain   Yes Unknown, Entered By History   acyclovir (ZOVIRAX) 400 MG tablet Take 1 tablet (400 mg) by mouth 2 times daily 7/3/2019 at Unknown time Yes Shayne Roberts MD   Carboxymethylcellulose Sod PF (REFRESH PLUS) 0.5 % SOLN ophthalmic solution 1 drop 4 times daily as needed for dry eyes  Yes Unknown, Entered By History   dexamethasone (DECADRON) 4 MG tablet Take 20mg (5 tablets) by mouth every week. 7/3/2019 at am Yes Shayne Roberts MD   diltiazem ER (DILT-XR) 180 MG 24 hr capsule Take 180 mg by mouth daily 7/3/2019 at Unknown time Yes Unknown, Entered By History   diphenhydrAMINE-acetaminophen (TYLENOL PM)  MG tablet Take 2 tablets by mouth At Bedtime 7/2/2019 at Unknown time Yes Unknown, Entered By History   furosemide (LASIX) 40 MG tablet Take 1 tablet (40 mg) by mouth daily 7/3/2019 at Unknown time Yes Elisabeth Means APRN CNP   latanoprost (XALATAN) 0.005 % ophthalmic solution Place 1 drop into the right eye At Bedtime 7/2/2019 at Unknown time Yes Unknown, Entered By History   lidocaine-prilocaine (EMLA) cream Apply to port site 1 hour prior to access  Yes Shayne Roberts MD   metoprolol succinate ER (TOPROL-XL) 50 MG 24 hr tablet Take 3 tablets (150 mg) by mouth 2 times daily 7/3/2019 at Unknown time Yes Elisabeth Means APRN CNP   Multiple  Vitamins-Minerals (DAILY MULTIVITAMIN) CAPS Take 1 tablet by mouth daily  7/3/2019 at Unknown time Yes Reported, Patient   oxyCODONE (ROXICODONE) 5 MG tablet Take 1 tablet (5 mg) by mouth every 4 hours as needed for pain maximum 4 tablet(s) per day  Yes Todd Loya APRN CNP   polyethylene glycol (MIRALAX/GLYCOLAX) powder Take 17 g by mouth daily as needed for constipation   Yes Reported, Patient   potassium chloride (KLOR-CON) 20 MEQ packet Take 20 mEq by mouth 2 times daily 7/3/2019 at Unknown time Yes Elisabeth Means APRN CNP   prochlorperazine (COMPAZINE) 10 MG tablet Take 1 tablet (10 mg) by mouth every 6 hours as needed for nausea or vomiting  Yes Shayne Roberts MD   SIMBRINZA 1-0.2 % ophthalmic suspension Place 1 drop into the right eye 2 times daily 1 drop AM and PM 7/3/2019 at Unknown time Yes Reported, Patient   Sodium Fluoride (SF 5000 PLUS) 1.1 % CREA Apply to affected area 3 times daily 7/3/2019 at Unknown time Yes Unknown, Entered By History   vitamin D3 (VITAMIN D3) 1000 units (25 mcg) tablet Take 1,000 Units by mouth daily 7/3/2019 at Unknown time Yes Reported, Patient   warfarin (COUMADIN) 4 MG tablet Take one tablet (4 mg) by mouth on Tuesdays and Saturdays; take one-half tablet ( 2 mg) on all other days of the week. 7/2/2019 at Unknown time Yes Unknown, Entered By History

## 2019-07-03 NOTE — ED PROVIDER NOTES
History     Chief Complaint:  Hypotension     HPI   Amira Arreola is a 86 year old anticoagulated female on coumadin with a history of paroxysmal atrial fibrillation, heart failure, hypertension, pulmonary hypertension, SVT, and multiple myeloma who presents to the emergency department with her daughters for evaluation of hypotension. Of note, the patient was recently admitted from 6/28-7/1/19 for atrial fibrillation with RVR, acute diastolic heart failure, and bilateral pleural effusions, and admits to not taking her diuretics prior to this admission. The patient's daughter reports that since discharge the patient has been progressively getting weaker and more lethargic, now to the point that she is unable to walk to the bathroom on her own and her daughters have to remind her to eat and drink.  She also had one episode of diarrhea yesterday which resolved after taking an anti-diarrheal. This morning, they were at a follow up appointment with Dr. Rivera of cardiology where she was noted to be hypotensive at 65/47 so she was sent here. He was also concerned for a possible UTI given the patient has had difficulty getting to the bathroom lately. Her daughters also note that the patient's home is not very accessible and they do not believe it is the best place for her any longer. The patient denies fever, chest pain, shortness of breath, palpitations, dizziness, or vomiting.     Of note, she is not receiving chemotherapy for the month of July but will resume treatment for her multiple myeloma next month.     Allergies:  Blood transfusion related  Penicillins      Medications:    Tylenol  Zovirax  Decadron  Refresh plus tears  Cardizem  Lasix  Xalatan  EMLA cream  Toprol XL  Roxicodone  Miralax  Klor-Con  Compazine  Coumadin      Past Medical History:    Acute diastolic heart failure  Ascending aorta dilatation  Bone cancer  Colonic polyp  Physical deconditioning   Hypertension  Long nodule  Menorrhagia  Multiple  "myeloma   Overactive bladder  Osteoporosis   Palpitations  Paroxysmal atrial fibrillation  Pulmonary hypertension  Sciatica of left side  Shingles  SVT  Thrombocytopenia     Past Surgical History:    Bone marrow biopsy  Bilateral cataracts  C section  Colonoscopy  Excise exostosis tibia/fibula  Left ankle replacement  Right ankle surgery     Family History:    Heart disease: mother, father  Cerebrovascular disease     Social History:  Presents to the ED with her daughters  Tobacco Use: Never  Alcohol Use: No  PCP: Addy Frias  Marital Status:        Review of Systems   Constitutional: Positive for fatigue. Negative for fever.   Respiratory: Negative for shortness of breath.    Cardiovascular: Negative for chest pain and palpitations.   Gastrointestinal: Negative for vomiting.   Neurological: Positive for weakness. Negative for dizziness.   All other systems reviewed and are negative.      Physical Exam     Patient Vitals for the past 24 hrs:   BP Temp Temp src Pulse Heart Rate Resp SpO2 Height Weight   07/03/19 1322 112/59 97.5  F (36.4  C) Oral -- 122 14 98 % 1.651 m (5' 5\") 55.7 kg (122 lb 14.4 oz)   07/03/19 1200 101/59 -- -- 92 83 8 99 % -- --   07/03/19 1130 105/77 -- -- 89 89 13 99 % -- --   07/03/19 1115 (!) 81/69 -- -- 92 82 (!) 34 96 % -- --   07/03/19 1100 94/71 -- -- 95 109 9 99 % -- --   07/03/19 1045 96/60 -- -- 71 89 (!) 0 100 % -- --   07/03/19 1030 97/74 -- -- 78 94 8 99 % -- --   07/03/19 1015 98/54 -- -- 114 109 16 (!) 88 % -- --   07/03/19 1008 98/71 -- -- 124 -- -- -- -- --   07/03/19 0945 (!) 89/63 -- -- 97 129 11 90 % -- --   07/03/19 0930 (!) 77/55 -- -- 113 (!) 268 26 96 % -- --   07/03/19 0929 -- -- -- -- -- -- -- 1.6 m (5' 3\") 56.7 kg (125 lb)   07/03/19 0928 112/74 99.2  F (37.3  C) Oral 113 -- 16 96 % -- --   07/03/19 0915 112/74 -- -- -- -- -- 97 % -- --       Physical Exam    Nursing note and vitals reviewed.  Constitutional:  Appears well-developed and well-nourished.   HENT: "   Head:    Atraumatic.   Mouth/Throat:   Oropharynx is clear with dry mucous membranes. No oropharyngeal exudate.   Eyes:    Pupils are equal, round, and reactive to light.   Neck:    Normal range of motion. Neck supple.      No tracheal deviation present. No thyromegaly present.   Cardiovascular:  Tachycardic rate, irregular rhythm, 2/6 murmur   Pulmonary/Chest: Few crackles in the right base. Port in the right upper chest with an overlying band aid. No redness, swelling, or tenderness.   Abdominal:   Soft. Bowel sounds are normal. Exhibits no distension and      no mass. There is no tenderness.      There is no rebound and no guarding.   Musculoskeletal:  Exhibits no edema.   Lymphadenopathy:  No cervical adenopathy.   Neurological:   Alert and oriented to person, place, and time. Cranial nerves 2-12 intact.  strong and equal. Lower extremity strength intact and equal.   Skin:    Skin is warm and dry. No rash noted. No pallor.     Emergency Department Course   ECG:  @ 0924  Indication: Hypotension  Vent. Rate 97 bpm. [place] interval 144 ms. QRS duration 76 ms. QT/QTc 314/398 ms. P-R-T axis 53 -30 -49.   Atrial fibrillation. Left axis deviation. Septal infarct, age undetermined. Possible lateral infarct, age undetermined. Inferior infarct, age undetermined. ST & T wave abnormality, consider anterior ischemia. Abnormal ECG.  No significant change when compared to previous ECG from 6/28/19   Read @ 0942 by Dr. Champagne.    Imaging:  Chest X-Ray. 2 Views:   IMPRESSION: Left basilar opacity compatible with a combination of  pleural effusion and parenchymal opacity, possibly unchanged compared  to 7/1/2019 given differences in positioning. Small right pleural  effusion and parenchymal opacity is also suspected. Mid and lower  thoracic spinal compression fractures are noted.  Reading per radiology.     Radiographic findings were communicated with the patient who voiced understanding of the findings.    Laboratory:  CBC:  WBC: 9.4, HGB: 13.2, PLT: 208  BMP: Glucose 126 (H), o/w WNL (Creatinine: 0.70)    0925 Troponin I: <0.015    BNP: 5903 (H)    0940 Lactic acid whole blood: 1.6    INR: 2.17 (H)    UA: Clear yellow urine, bacteria: few, squamous epithelial: 3 (H), mucous: present, hyaline casts: 10, otherwise WNL    0924 Blood culture: In process  1009 Blood culture: In process    Interventions:  1011 0.9% Sodium Chloride infusion 161 mLs     Emergency Department Course:  0908 Nursing notes and vitals reviewed. I performed an exam of the patient as documented above.     IV inserted. Medicine administered as documented above. Blood drawn. This was sent to the lab for further testing, results above.    EKG was done, interpretation as above.    The patient provided a urine sample here in the emergency department. This was sent for laboratory testing, findings above.     The patient was sent for a chest XR while in the emergency department, findings above.     1056 I rechecked the patient and discussed the results of her workup thus far.     1102  I consulted with Dr. Goins of the hospitalist services. They are in agreement to accept the patient for admission.    1105 I rechecked the patient and discussed the results of her workup thus far.     Findings and plan explained to the Patient who consents to admission. Discussed the patient with Dr. Goins, who will admit the patient to a [facility] bed for further monitoring, evaluation, and treatment.    Impression & Plan    Medical Decision Making:  Amira Arreola is a 86 year old female who was sent to the emergency department because of hypotension, but her initial blood pressure was normal. Her blood pressures tended to fluctuate between soft blood pressures and normal blood pressure readings here in the ED and since she was not having any consistent hypotension and has a history of severe CHF, the hospitalist and I decided to hold on IV fluids at this time since she does not appear  clinically dehydrated either. She has bilateral pleural effusions, which will need thoracentesis, and we felt this would improve her symptoms and she had atrial fibrillation which a fluctuating heart rate between normal and mildly tachycardic so she was not given any additional recontrol medication at this time. She was admitted to the hospital under Dr. Goins to monitor her fluid status and have thoracentesis procedure. There was no sign of infection or sepsis, she does not have any sign of pneumonia on her chest XR. Her uranalysis was clear, and white blood cell count was normal. There was no sign of myocardial infarction and her troponin was normal. She was not symptomatic with any of the soft blood pressure readings.     Diagnosis:    ICD-10-CM    1. Acute systolic congestive heart failure (H) I50.21 Blood culture     Blood culture     Troponin I - Now then in 6 hours x 2   2. Rapid atrial fibrillation (H) I48.91    3. Bilateral pleural effusion J90        Disposition:  Admitted to Dr. Goins    Scribe Disclosure:  Pelon WINCHESTER, am serving as a scribe on 7/3/2019 at 9:08 AM to personally document services performed by Bianca Rockwell MD based on my observations and the provider's statements to me.     Pelon Fofana  7/3/2019    EMERGENCY DEPARTMENT       Bianca Champagne MD  07/03/19 2597

## 2019-07-03 NOTE — PROGRESS NOTES
HPI and Plan:   See dictation(#[id number])    No orders of the defined types were placed in this encounter.      No orders of the defined types were placed in this encounter.      There are no discontinued medications.      Encounter Diagnoses   Name Primary?     Chronic heart failure with preserved ejection fraction (H)      Paroxysmal atrial fibrillation (H)        CURRENT MEDICATIONS:  Current Outpatient Medications   Medication Sig Dispense Refill     acetaminophen (TYLENOL) 500 MG tablet Take 500 mg by mouth every 6 hours as needed for mild pain        acyclovir (ZOVIRAX) 400 MG tablet Take 1 tablet (400 mg) by mouth 2 times daily 60 tablet 3     Carboxymethylcellulose Sod PF (REFRESH PLUS) 0.5 % SOLN ophthalmic solution 1 drop 4 times daily as needed for dry eyes       dexamethasone (DECADRON) 4 MG tablet Take 20mg (5 tablets) by mouth every week. 28 tablet 3     diltiazem ER (CARDIZEM SR) 60 MG 12 hr capsule Take 1 capsule (60 mg) by mouth daily 30 capsule 0     diltiazem ER (DILT-XR) 120 MG 24 hr capsule Take 1 capsule (120 mg) by mouth daily       diphenhydrAMINE-acetaminophen (TYLENOL PM)  MG tablet Take 2 tablets by mouth At Bedtime       furosemide (LASIX) 40 MG tablet Take 1 tablet (40 mg) by mouth daily 60 tablet 1     latanoprost (XALATAN) 0.005 % ophthalmic solution Place 1 drop into the right eye At Bedtime       lidocaine-prilocaine (EMLA) cream Apply to port site 1 hour prior to access 30 g 1     metoprolol succinate ER (TOPROL-XL) 50 MG 24 hr tablet Take 3 tablets (150 mg) by mouth 2 times daily 180 tablet 3     Multiple Vitamins-Minerals (DAILY MULTIVITAMIN) CAPS Take 1 tablet by mouth daily        oxyCODONE (ROXICODONE) 5 MG tablet Take 1 tablet (5 mg) by mouth every 4 hours as needed for pain maximum 4 tablet(s) per day 30 tablet 0     polyethylene glycol (MIRALAX/GLYCOLAX) powder Take 17 g by mouth daily as needed for constipation        potassium chloride (KLOR-CON) 20 MEQ packet Take 20  mEq by mouth 2 times daily 60 packet 1     prochlorperazine (COMPAZINE) 10 MG tablet Take 1 tablet (10 mg) by mouth every 6 hours as needed for nausea or vomiting 30 tablet 1     SIMBRINZA 1-0.2 % ophthalmic suspension Place 1 drop into the right eye 2 times daily 1 drop AM and PM  2     Sodium Fluoride (SF 5000 PLUS) 1.1 % CREA Apply to affected area 3 times daily       vitamin D3 (VITAMIN D3) 1000 units (25 mcg) tablet Take 1,000 Units by mouth daily       warfarin (COUMADIN) 4 MG tablet Take one tablet (4 mg) by mouth on Tuesdays and Saturdays; take one-half tablet ( 2 mg) on all other days of the week.         ALLERGIES     Allergies   Allergen Reactions     Blood Transfusion Related (Informational Only)      Blood antigens related to receiving Darzelex-AG     Penicillin [Penicillins] Rash     Blotches on chest        PAST MEDICAL HISTORY:  Past Medical History:   Diagnosis Date     Abnormal CXR 2018    then ct done and not significant     Acute diastolic heart failure (H) 03/22/2019    nl ef, 2+mr and tr with severe pulm htn     Ascending aorta dilatation (H) 04/2016    on echo, mild, fu 7/18 4.0, slightly larger     Cancer, metastatic to bone (H)     due to myeloma     Colonic polyp 2008    adenomatous, fu 2013 tics only     Compression fracture 2016    multiple areas of spine     Dry eyes      Elevated MCV 2015    b12 and folic acid nl     HTN (hypertension) 2000    off meds for years     Lung nodule 08/2018    on ct, 4mm, ct done for fu abnl cxr     Menorrhagia 2002    hysteroscopy and d and c done     MGUS (monoclonal gammopathy of unknown significance) 2015    eval by Dr. Roberts     Multiple myeloma (H) 2016    dx 5/16 at [facility], bone lesions seen on mri 6/16     OAB (overactive bladder) 2013    Dr. Grullon     Osteoporosis     fu done 2010 and stable, went off meds then, fu done 2013; has had gyn fu and added evista 2013 by gyn     Palpitations 4/16    nl echo, mildly dilated asc aorta     Paroxysmal  atrial fibrillation (H)     had palp and ziopatch showed it, echo nl lv fxn, mild mr and tr, added coum and toprol, toprol dose raised 16     Pulmonary hypertension (H) 2019    seen on echo     Sciatica of left side     Dr. Helen Ricardo      SVT (supraventricular tachycardia) (H)     on ziopatch     Thrombocytopenia (H)        PAST SURGICAL HISTORY:  Past Surgical History:   Procedure Laterality Date     BONE MARROW BIOPSY, BONE SPECIMEN, NEEDLE/TROCAR N/A 2016    Procedure: BIOPSY BONE MARROW;  Surgeon: Bryan Patel MD;  Location:  GI     CATARACT IOL, RT/LT        SECTION  ,      COLONOSCOPY  2013    Procedure: COLONOSCOPY;  COLONOSCOPY;  Surgeon: Steffany Rockwell MD;  Location:  GI     EXCISE EXOSTOSIS TIBIA / FIBULA  2014    Procedure: EXCISE EXOSTOSIS TIBIA / FIBULA;  Surgeon: Naila Pichardo MD;  Location:  [facility]     hysteroscopy and d and c      due to bleeding     left anle replacement       right ankle surgery         FAMILY HISTORY:  Family History   Problem Relation Age of Onset     Heart Disease Father      C.A.D. Mother      Cerebrovascular Disease Brother      Family History Negative Sister      Family History Negative Sister      Family History Negative Brother        SOCIAL HISTORY:  Social History     Socioeconomic History     Marital status:      Spouse name: Tom     Number of children: 6     Years of education: None     Highest education level: None   Occupational History     Occupation: rn anesthetist     Employer: RETIRED   Social Needs     Financial resource strain: None     Food insecurity:     Worry: None     Inability: None     Transportation needs:     Medical: None     Non-medical: None   Tobacco Use     Smoking status: Never Smoker     Smokeless tobacco: Never Used   Substance and Sexual Activity     Alcohol use: No     Alcohol/week: 0.0 oz     Drug use: No     Sexual  "activity: Never   Lifestyle     Physical activity:     Days per week: None     Minutes per session: None     Stress: None   Relationships     Social connections:     Talks on phone: None     Gets together: None     Attends Zoroastrian service: None     Active member of club or organization: None     Attends meetings of clubs or organizations: None     Relationship status: None     Intimate partner violence:     Fear of current or ex partner: None     Emotionally abused: None     Physically abused: None     Forced sexual activity: None   Other Topics Concern     Parent/sibling w/ CABG, MI or angioplasty before 65F 55M? Not Asked   Social History Narrative     None       Review of Systems:  Skin:  Positive for bruising     Eyes:  Positive for glaucoma    ENT:  Negative      Respiratory:  Positive for cough     Cardiovascular:    Positive for;fatigue    Gastroenterology: Negative      Genitourinary:  Negative      Musculoskeletal:  Positive for arthritis    Neurologic:  Positive for local weakness;incoordination    Psychiatric:  Negative      Heme/Lymph/Imm:  Positive for allergies    Endocrine:  Negative        Physical Exam:  Vitals: BP (!) 65/47   Pulse 74   Ht 1.651 m (5' 5\")   SpO2 93%   BMI 21.62 kg/m      Constitutional:           Skin:             Head:           Eyes:           Lymph:      ENT:           Neck:           Respiratory:            Cardiac:                                                           GI:           Extremities and Muscular Skeletal:                 Neurological:           Psych:             CC  Addy Frias MD  [address] [facility] [address]  [place] [address]                  "

## 2019-07-03 NOTE — LETTER
7/3/2019      Addy Frias MD  [address]  [facility] [address]      RE: Amira Roseon       Dear Colleague,    I had the pleasure of seeing Amira Arreola in the [facility].    Service Date: 07/03/2019      REASON FOR CARDIOLOGY VISIT:  Recent hospitalization, atrial fibrillation, history of preserved ejection fraction, congestive heart failure.      HISTORY OF PRESENT ILLNESS:  Ms. Arreola is a very pleasant, 86-year-old female whom I have seen in the Cardiology Clinic for atrial fibrillation on rate control strategy.  She has other comorbidities of multiple myeloma, on chemotherapy.  I saw the patient last time in 04/2018.  Since that time, she has had deterioration of her health.  She was recently admitted in the hospital for weakness and elevated ventricular rates.  As her diltiazem dose was recently decreased from 180-120, this was increased back again to 180, and she was only discharged 2 days ago.  Today she is being brought in the clinic by her daughter and daughter-in-law.  The patient did well on the first day after discharge, but since yesterday she started having more confusion and drowsiness.  In fact, today her blood pressure in the clinic was recorded as 65/47.  This was personally checked by me, and it was 68/46.  She denies any dizziness or lightheadedness, but she is definitely confused and oriented x2 only.  She took diltiazem 180 mg this morning, too.  In addition to diltiazem, she is on metoprolol succinate 50 mg b.i.d.  She is on Lasix 40 mg daily.  She is on Coumadin.  Her chemotherapy has been recently on hold because of deteriorating health.  Echocardiogram done in March this year showed LVEF of 55%-60% with moderate mitral and moderate tricuspid regurgitation with severe pulmonary hypertension.  To be noted, the plan of palliative care review was there, but the clinic appointment was canceled because the patient was in the hospital.  This idea  was also broached upon recent hospitalization, but the patient remained full code.  To be noted, the patient is not able to get up from the chair.  Even going to the restroom is making a huge task for the family.  She did have a bout of diarrhea yesterday.            PHYSICAL EXAMINATION:     VITAL SIGNS:  Blood pressure 65/47, heart rate 74 and regular*** (please check regular or irregular).  Unable to take weight as the patient is unable to get up.  Her latest weight was 134 pounds.  Her dry weight is around 128-130 pounds.   GENERAL:  The patient appears quite frail, somewhat somnolent.  Able to answer name and her date of birth, but unable to answer the place.   NECK:  JVP around 10 cm.   CARDIOVASCULAR:  Irregular*** (please check irregular or regular), normal rate, a grade 2/6 systolic murmur heard over the left lower sternal border, no S3 or S4.   RESPIRATORY:  Decreased air entry on the left side.  To be noted, the patient was diagnosed with left pleural effusion on recent x-ray.   ABDOMEN:  Soft, nontender.   EXTREMITIES:  No pitting pedal edema.   NEUROLOGICAL:  Oriented x2.   HEENT:  No pallor noted.      IMPRESSION AND PLAN:  A very pleasant but frail, 86-year-old female with history of chronic atrial fibrillation on rate control strategy with recent issues with hypotension with higher dose of diltiazem, which was decreased, eventually leading to rapid ventricular rate, and again the dose was increased, now with again hypotension and symptomatic in terms of somnolence with comorbidities of multiple myeloma on chemotherapy, which has been withheld of late.  I had a long discussion with the patient and her family.  She definitely is having symptomatic hypotension.  Unfortunately, she took the morning dose of diltiazem a few hours ago.  Family is also very overwhelmed in terms of her care, as she is not able to even get up from the chair.  At this time, I recommended the patient be admitted to the hospital  and will likely need some IV fluids and withholding of her AV solomon-blocking agents.  Since I have seen her about a year ago, there is significant deterioration in her health.  We again broached the topic of palliative care and comfort care.  The patient seems more amenable to it.  We also discussed about code status.  The patient will think about the code status, but I do feel that it is unlikely that she will be able to have a meaningful neurological recovery in case she has a cardiac arrest and receives CPR.  The patient and her family are very interested in seeing Palliative while they are in the hospital, and I think this should be arranged at this time in the hospital to address overall long-term goals of care as well as code status.   1.  Symptomatic hypotension.  This could be the result of a higher dose of diltiazem in addition to the patient being on a beta blocker.  Other causes cannot be ruled out.  With her frail status and urinary incontinence, I am also worried about the possibility of a UTI.  She needs to be checked with a UA, although no typical dysuria symptoms.   2.  Chronic atrial fibrillation.  She has been on rate control strategy with a combination of beta blocker and calcium channel blocker.  These medications have to be decreased because of hypotension.  One option would be to consider digoxin as the patient is predominantly sedentary, and she should be able to achieve ventricular rate control at rest.  Not a candidate for rhythm control strategy given severely dilated left atrium.   3.  History of preserved ejection fraction and congestive heart failure.  On examination, there is a little bit of elevation of JVP, and she also has a left pleural effusion.  She is on 40 mg of Lasix for now.  I think it is reasonable to continue the same.   4.  Frail status.   5.  Multiple myeloma.      RECOMMENDATIONS:   1.  Given symptomatic hypotension, I think she needs to be admitted to the hospital.  She  will likely need some IV fluid resuscitation and also evaluation for any sepsis or infection.   2.  I strongly recommend the patient to be seen by Palliative this hospitalization to discuss goals of care and code status.  I also briefly discussed with the patient about code status as noted above.   3.  We will get the patient admitted to the hospital through the ER, and I will update the ER staff.         MELISSA MONSIVAIS MD             D: 2019   T: 2019   MT: [email or web address]      Name:     ALBERTO PARSON   MRN:      [id number]        Account:      [id number]   :      1932           Service Date: 2019      Document: [id number]         Outpatient Encounter Medications as of 7/3/2019   Medication Sig Dispense Refill     acetaminophen (TYLENOL) 500 MG tablet Take 500 mg by mouth every 6 hours as needed for mild pain        acyclovir (ZOVIRAX) 400 MG tablet Take 1 tablet (400 mg) by mouth 2 times daily 60 tablet 3     Carboxymethylcellulose Sod PF (REFRESH PLUS) 0.5 % SOLN ophthalmic solution 1 drop 4 times daily as needed for dry eyes       dexamethasone (DECADRON) 4 MG tablet Take 20mg (5 tablets) by mouth every week. 28 tablet 3     latanoprost (XALATAN) 0.005 % ophthalmic solution Place 1 drop into the right eye At Bedtime       lidocaine-prilocaine (EMLA) cream Apply to port site 1 hour prior to access 30 g 1     Multiple Vitamins-Minerals (DAILY MULTIVITAMIN) CAPS Take 1 tablet by mouth daily        polyethylene glycol (MIRALAX/GLYCOLAX) powder Take 17 g by mouth daily as needed for constipation        prochlorperazine (COMPAZINE) 10 MG tablet Take 1 tablet (10 mg) by mouth every 6 hours as needed for nausea or vomiting 30 tablet 1     SIMBRINZA 1-0.2 % ophthalmic suspension Place 1 drop into the right eye 2 times daily 1 drop AM and PM  2     Sodium Fluoride (SF 5000 PLUS) 1.1 % CREA Apply to affected area 3 times daily       warfarin (COUMADIN) 4 MG tablet Take one tablet (4 mg) by mouth on  Tuesdays and Saturdays; take one-half tablet ( 2 mg) on all other days of the week.       [DISCONTINUED] diltiazem ER (CARDIZEM SR) 60 MG 12 hr capsule Take 1 capsule (60 mg) by mouth daily 30 capsule 0     [DISCONTINUED] diltiazem ER (DILT-XR) 120 MG 24 hr capsule Take 1 capsule (120 mg) by mouth daily       [DISCONTINUED] diphenhydrAMINE-acetaminophen (TYLENOL PM)  MG tablet Take 2 tablets by mouth At Bedtime       [DISCONTINUED] furosemide (LASIX) 40 MG tablet Take 1 tablet (40 mg) by mouth daily 60 tablet 1     [DISCONTINUED] metoprolol succinate ER (TOPROL-XL) 50 MG 24 hr tablet Take 3 tablets (150 mg) by mouth 2 times daily 180 tablet 3     [DISCONTINUED] oxyCODONE (ROXICODONE) 5 MG tablet Take 1 tablet (5 mg) by mouth every 4 hours as needed for pain maximum 4 tablet(s) per day 30 tablet 0     [DISCONTINUED] potassium chloride (KLOR-CON) 20 MEQ packet Take 20 mEq by mouth 2 times daily 60 packet 1     [DISCONTINUED] vitamin D3 (VITAMIN D3) 1000 units (25 mcg) tablet Take 1,000 Units by mouth daily       No facility-administered encounter medications on file as of 7/3/2019.      Again, thank you for allowing me to participate in the care of your patient.      Sincerely,    Ramos Rivera MD     [facility]

## 2019-07-03 NOTE — H&P
[facility]    History and Physical - Hospitalist Service       Date of Admission:  7/3/2019    Assessment & Plan   Amira Arreola is a 86 year old female admitted on 7/3/2019. She presents from cardiology clinic where she had scheduled follow-up.  Found to be hypotensive 60s/40s, with altered mental status.  Patient has complicated history of chronic atrial fibrillation anticoagulated on warfarin, hypertension, chronic diastolic heart failure, pulmonary hypertension, SVT, metastatic multiple myeloma.    Acute onset symptomatic hypotension, altered mental status  Hx of chronic atrial fibrillation anticoagulated on warfarin, hypertension, chronic diastolic heart failure, pulmonary hypertension,  Hx of SVT  [PTA diltiazem, metoprolol succinate, Lasix, Coumadin]  A follow-up appointment in the cardiology clinic today where the provider found a systolic BP of 65/40s, with AMS, oriented x2 and lethargic.  Etiology suspicious for combination effect of CCB and beta-blockers, however other etiologies cannot be ruled out. The patient does endorse being noncompliant with her diuretics prior to this admission.  Family is at the bedside, shared concerns of ongoing lethargy and weakness, prohibiting her from being functional at home.  Given her acute onset hypotension, the patient was transferred to the ED.  Of note, she was recently admitted to [facility] from 6/28- 7/1/2019 for atrial fibrillation with RVR, acute on chronic diastolic heart failure, bilateral pleural effusions (no thora completed). Most recent echocardiogram (March 2019 ) shows LVEF 55 to 60% with moderate mitral and moderate tricuspid regurgitation, severe pulmonary hypertension.. There is concern of UTI as the patient has been having some incontinence and diarrhea;  (BCx sent, UC pending, lactic normal).  ED work-up shows normal renal function, elevated BNP 5903, negative troponin,  --BP slightly improved in the emergency department, around  80s/60s  --Would favor metoprolol IV over diltiazem for rate control - will add metoprolol IV every 6 hours with parameters  --Cardiology consult - appreciate assistance in prognostication, rate control, diuresis in light of hypotension  --VBG  --UA shows no WBCs, 10 hyaline casts-question intravascular hypovolemia?  --Mental status precludes oral intake, will start with 50 cc/hour x 10 hours (500cc total slowly)    Left pleural effusion  Chest x-ray in the emergency department shows left basilar opacity compatible with a combination pleural effusion and parenchymal opacity somewhat unchanged to 7/1/2019.  Small right pleural effusion noted.  --Question if this is related to her hypervolemia versus malignant effusion  --I discussed in depth with the family regarding whether thoracentesis is indicated at this point, we decided to allow a family discussion to occur first before any procedures occur    Goals of Care conversation  Metastatic Multiple myeloma   Deconditioning  Follows with  of oncology, chemo is on hold until August due to her continued physical decline.  Patient's overall clinical status has been declining significantly over the past few months.  I had a long conversation with the patient's daughter and her daughter-in-law at the bedside.  Much of the conversation the patient is unaware, unable to participate.  We discussed the overall big picture, the risks and benefits of resuscitation, and the benefits of a family conference (patient has 6 children).  Fortunately, the patient's children are all in town, and would request a family conference tomorrow after the last child flies in.  Based on our conversation, I suspect the family will opt for a comfort approach tomorrow unless there is a significant improvement in her overall function.  --Patient has been showing signs of an underlying dementia, she becomes more confused in the evening hours, and is unable to care for herself at home  --Care  coordinator for disposition  --Palliative care has been consulted in the past, but appointment was not completed -the family and I agree, believes that palliative will be helpful to help guide the conversation further       Diet: N.p.o.  DVT Prophylaxis: Pneumatic Compression Devices  Russo Catheter: not present  Code Status: DNR/I    Disposition Plan   Expected discharge: 2 - 3 days, recommended to TCU versus hospice once adequate pain management/ tolerating PO medications and safe disposition plan/ TCU bed available.  Entered: DAYSI Trejo CNP 07/03/2019, 11:11 AM     The patient's care was discussed with the Attending Physician, Dr. Addy Goins, Bedside Nurse, Patient and Patient's Family. I have discussed prognosis and decision to shift to comfort measures, and all consultants (Palliative care MD, Oncology and cardiology) will weigh in regarding goals and care.    DAYSI Trejo CNP  [facility]    ______________________________________________________________________    Chief Complaint   Acute onset hypertension    History is obtained from the patient    History of Present Illness   Amira Arreola is a 86 year old female who presents from cardiology clinic where she had scheduled follow-up.  Found to be hypotensive 60s/40s, with altered mental status.  Patient has complicated history of chronic atrial fibrillation anticoagulated on warfarin, hypertension, chronic diastolic heart failure, pulmonary hypertension, SVT, metastatic multiple myeloma.  Much of my interview is completed with the patient's family at the bedside as the patient is unable to participate due to mental status.  Full ROS unable to be obtained, however the patient does shake her head no to chest pain, dizziness, pain.  Per family, the patient's functional status and overall strength and physical status has been declining over the past 1 month particularly over the past 48 hours to the point the patient needed  help moving her feet to get to the bathroom.  She has had bouts of incontinence at home, and has had a significant cognitive decline over the past few months, however mentation has decreased over the past 48 hours.    Review of Systems    Review of systems not obtained due to patient factors - confusion, critical condition and mental status    Past Medical History    I have reviewed this patient's medical history and updated it with pertinent information if needed.   Past Medical History:   Diagnosis Date     Abnormal CXR 2018    then ct done and not significant     Acute diastolic heart failure (H) 03/22/2019    nl ef, 2+mr and tr with severe pulm htn     Ascending aorta dilatation (H) 04/2016    on echo, mild, fu 7/18 4.0, slightly larger     Cancer, metastatic to bone (H)     due to myeloma     Colonic polyp 2008    adenomatous, fu 2013 tics only     Compression fracture 2016    multiple areas of spine     Dry eyes      Elevated MCV 2015    b12 and folic acid nl     HTN (hypertension) 2000    off meds for years     Lung nodule 08/2018    on ct, 4mm, ct done for fu abnl cxr     Menorrhagia 2002    hysteroscopy and d and c done     MGUS (monoclonal gammopathy of unknown significance) 2015    eval by Dr. Roberts     Multiple myeloma (H) 2016    dx 5/16 at [facility], bone lesions seen on mri 6/16     OAB (overactive bladder) 2013    Dr. Grullon     Osteoporosis     fu done 2010 and stable, went off meds then, fu done 2013; has had gyn fu and added evista 2013 by gyn     Palpitations 4/16    nl echo, mildly dilated asc aorta     Paroxysmal atrial fibrillation (H) 4/16    had palp and ziopatch showed it, echo nl lv fxn, mild mr and tr, added coum and toprol, toprol dose raised 12/22/16     Pulmonary hypertension (H) 03/2019    seen on echo     Sciatica of left side 12/13    Dr. Helen Ricardo 2004     SVT (supraventricular tachycardia) (H) 4/16    on ziopatch     Thrombocytopenia (H) 2014       Past Surgical History    I have reviewed this patient's surgical history and updated it with pertinent information if needed.  Past Surgical History:   Procedure Laterality Date     BONE MARROW BIOPSY, BONE SPECIMEN, NEEDLE/TROCAR N/A 2016    Procedure: BIOPSY BONE MARROW;  Surgeon: Bryan Patel MD;  Location:  GI     CATARACT IOL, RT/LT        SECTION  1965, 1966     COLONOSCOPY  2013    Procedure: COLONOSCOPY;  COLONOSCOPY;  Surgeon: Steffany Rockwell MD;  Location:  GI     EXCISE EXOSTOSIS TIBIA / FIBULA  2014    Procedure: EXCISE EXOSTOSIS TIBIA / FIBULA;  Surgeon: Naila Pichardo MD;  Location:  SD     hysteroscopy and d and c      due to bleeding     left anle replacement       right ankle surgery         Social History   I have reviewed this patient's social history and updated it with pertinent information if needed.  Social History     Tobacco Use     Smoking status: Never Smoker     Smokeless tobacco: Never Used   Substance Use Topics     Alcohol use: No     Alcohol/week: 0.0 oz     Drug use: No   Patient's  is a retired psychiatrist, the patient is a retired CRNA.  The patient has 6 children.  She is currently living at home with her , daughter and son-in-law.    Family History   I have reviewed this patient's family history and updated it with pertinent information if needed.   Family History   Problem Relation Age of Onset     Heart Disease Father      C.A.D. Mother      Cerebrovascular Disease Brother      Family History Negative Sister      Family History Negative Sister      Family History Negative Brother        Prior to Admission Medications   Prior to Admission Medications   Prescriptions Last Dose Informant Patient Reported? Taking?   Carboxymethylcellulose Sod PF (REFRESH PLUS) 0.5 % SOLN ophthalmic solution  Care Giver Yes No   Si drop 4 times daily as needed for dry eyes   Multiple Vitamins-Minerals (DAILY MULTIVITAMIN) [facility]  Giver Yes No   Sig: Take 1 tablet by mouth daily    SIMBRINZA 1-0.2 % ophthalmic suspension  Care Giver Yes No   Sig: Place 1 drop into the right eye 2 times daily 1 drop AM and PM   Sodium Fluoride (SF 5000 PLUS) 1.1 % CREA  Care Giver Yes No   Sig: Apply to affected area 3 times daily   acetaminophen (TYLENOL) 500 MG tablet  Care Giver Yes No   Sig: Take 500 mg by mouth every 6 hours as needed for mild pain    acyclovir (ZOVIRAX) 400 MG tablet  Care Giver No No   Sig: Take 1 tablet (400 mg) by mouth 2 times daily   dexamethasone (DECADRON) 4 MG tablet  Care Giver No No   Sig: Take 20mg (5 tablets) by mouth every week.   diltiazem ER (CARDIZEM SR) 60 MG 12 hr capsule   No No   Sig: Take 1 capsule (60 mg) by mouth daily   diltiazem ER (DILT-XR) 120 MG 24 hr capsule   Yes No   Sig: Take 1 capsule (120 mg) by mouth daily   diphenhydrAMINE-acetaminophen (TYLENOL PM)  MG tablet  Care Giver Yes No   Sig: Take 2 tablets by mouth At Bedtime   furosemide (LASIX) 40 MG tablet  Care Giver No No   Sig: Take 1 tablet (40 mg) by mouth daily   latanoprost (XALATAN) 0.005 % ophthalmic solution  Care Giver Yes No   Sig: Place 1 drop into the right eye At Bedtime   lidocaine-prilocaine (EMLA) cream  Care Giver No No   Sig: Apply to port site 1 hour prior to access   metoprolol succinate ER (TOPROL-XL) 50 MG 24 hr tablet  Care Giver No No   Sig: Take 3 tablets (150 mg) by mouth 2 times daily   oxyCODONE (ROXICODONE) 5 MG tablet  Care Giver No No   Sig: Take 1 tablet (5 mg) by mouth every 4 hours as needed for pain maximum 4 tablet(s) per day   polyethylene glycol (MIRALAX/GLYCOLAX) powder  Care Giver Yes No   Sig: Take 17 g by mouth daily as needed for constipation    potassium chloride (KLOR-CON) 20 MEQ packet  Care Giver No No   Sig: Take 20 mEq by mouth 2 times daily   prochlorperazine (COMPAZINE) 10 MG tablet  Care Giver No No   Sig: Take 1 tablet (10 mg) by mouth every 6 hours as needed for nausea or vomiting   vitamin D3  (VITAMIN D3) 1000 units (25 mcg) tablet  Care Giver Yes No   Sig: Take 1,000 Units by mouth daily   warfarin (COUMADIN) 4 MG tablet  Care Giver Yes No   Sig: Take one tablet (4 mg) by mouth on Tuesdays and Saturdays; take one-half tablet ( 2 mg) on all other days of the week.      Facility-Administered Medications: None     Allergies   Allergies   Allergen Reactions     Blood Transfusion Related (Informational Only)      Blood antigens related to receiving Darzelex-AG     Penicillin [Penicillins] Rash     Blotches on chest        Physical Exam   Vital Signs: Temp: 99.2  F (37.3  C) Temp src: Oral BP: 97/74 Pulse: 78 Heart Rate: 94 Resp: 8 SpO2: 99 % O2 Device: None (Room air)    Weight: 125 lbs 0 oz    Constitutional: fatigued, arousable to voice for a short time   Eyes: lids and lashes normal and pupils equal, round and reactive to light  ENT: normocepalic, without obvious abnormality  Respiratory: labile RR, periods of apnea, shallow inspiration, decreased air exchange, no retractions and crackles diffuse anterior crackles  Cardiovascular: irregularly irregular rhythm, normal S1 and S2 and murmurs include systolic murmur II/VI   GI: soft, non-distended and non tenderness  Skin: fragile, thin, various red lesions on arms/legs   Neuropsychiatric: General: lethargic, fatigued  Level of consciousness: lethargic  Affect: normal and pleasant when awake   Orientation: oriented only to self  Memory and insight: impaired: memory, insight     Data   Data reviewed today: I reviewed all medications, new labs and imaging results over the last 24 hours. I personally reviewed the EKG tracing showing afib and the chest x-ray image(s) showing left basilar opacity, pl effusion vs parenchymal opacity.    Recent Labs   Lab 07/03/19  0925 07/03/19  0923 07/01/19  1058 07/01/19  0525 06/30/19  0545  06/28/19  2114   WBC 9.4  --   --   --   --   --  7.2   HGB 13.2  --   --   --   --   --  12.9   MCV 96  --   --   --   --   --  96   PLT  208  --   --   --   --   --  199   INR  --  2.17*  --  2.11* 2.24*   < > 3.89*     --  138  --   --   --  138   POTASSIUM 4.7  --  3.7  --   --   --  3.6   CHLORIDE 105  --  104  --   --   --  104   CO2 27  --  30  --   --   --  28   BUN 19  --  17  --   --   --  22   CR 0.70  --  0.57  --   --   --  0.70   ANIONGAP 7  --  4  --   --   --  6   BRADLEY 9.7  --  8.9  --   --   --  9.0   *  --  107*  --   --   --  140*   ALBUMIN  --   --   --   --   --   --  3.1*   PROTTOTAL  --   --   --   --   --   --  6.2*   BILITOTAL  --   --   --   --   --   --  0.5   ALKPHOS  --   --   --   --   --   --  62   ALT  --   --   --   --   --   --  79*   AST  --   --   --   --   --   --  48*   TROPI <0.015  --   --   --  <0.015  --  <0.015    < > = values in this interval not displayed.     Recent Results (from the past 24 hour(s))   Chest XR,  PA & LAT    Narrative    CHEST TWO VIEWS July 3, 2019 10:01 AM     HISTORY: Check for pulmonary edema or pneumonia, hypotension and  weakness.      Impression    IMPRESSION: Left basilar opacity compatible with a combination of  pleural effusion and parenchymal opacity, possibly unchanged compared  to 7/1/2019 given differences in positioning. Small right pleural  effusion and parenchymal opacity is also suspected. Mid and lower  thoracic spinal compression fractures are noted.

## 2019-07-04 LAB
ALBUMIN SERPL-MCNC: 2.4 G/DL (ref 3.4–5)
ALP SERPL-CCNC: 52 U/L (ref 40–150)
ALT SERPL W P-5'-P-CCNC: 32 U/L (ref 0–50)
ANION GAP SERPL CALCULATED.3IONS-SCNC: 10 MMOL/L (ref 3–14)
AST SERPL W P-5'-P-CCNC: 22 U/L (ref 0–45)
BILIRUB DIRECT SERPL-MCNC: 0.2 MG/DL (ref 0–0.2)
BILIRUB SERPL-MCNC: 0.6 MG/DL (ref 0.2–1.3)
BUN SERPL-MCNC: 35 MG/DL (ref 7–30)
CA-I SERPL ISE-MCNC: 4.8 MG/DL (ref 4.4–5.2)
CALCIUM SERPL-MCNC: 8.7 MG/DL (ref 8.5–10.1)
CHLORIDE SERPL-SCNC: 108 MMOL/L (ref 94–109)
CO2 SERPL-SCNC: 24 MMOL/L (ref 20–32)
CREAT SERPL-MCNC: 0.79 MG/DL (ref 0.52–1.04)
GFR SERPL CREATININE-BSD FRML MDRD: 68 ML/MIN/{1.73_M2}
GLUCOSE SERPL-MCNC: 235 MG/DL (ref 70–99)
INR PPP: 1.79 (ref 0.86–1.14)
POTASSIUM SERPL-SCNC: 3.5 MMOL/L (ref 3.4–5.3)
PROT SERPL-MCNC: 5.2 G/DL (ref 6.8–8.8)
SODIUM SERPL-SCNC: 142 MMOL/L (ref 133–144)

## 2019-07-04 PROCEDURE — 25000132 ZZH RX MED GY IP 250 OP 250 PS 637: Mod: GY | Performed by: NURSE PRACTITIONER

## 2019-07-04 PROCEDURE — 99233 SBSQ HOSP IP/OBS HIGH 50: CPT | Performed by: HOSPITALIST

## 2019-07-04 PROCEDURE — 82330 ASSAY OF CALCIUM: CPT | Performed by: HOSPITALIST

## 2019-07-04 PROCEDURE — 85610 PROTHROMBIN TIME: CPT | Performed by: HOSPITALIST

## 2019-07-04 PROCEDURE — 21000001 ZZH R&B HEART CARE

## 2019-07-04 PROCEDURE — 25000132 ZZH RX MED GY IP 250 OP 250 PS 637: Mod: GY | Performed by: HOSPITALIST

## 2019-07-04 PROCEDURE — 25000128 H RX IP 250 OP 636: Performed by: HOSPITALIST

## 2019-07-04 PROCEDURE — 80048 BASIC METABOLIC PNL TOTAL CA: CPT | Performed by: HOSPITALIST

## 2019-07-04 PROCEDURE — 25800030 ZZH RX IP 258 OP 636: Performed by: HOSPITALIST

## 2019-07-04 RX ORDER — ACYCLOVIR 400 MG/1
400 TABLET ORAL
Status: DISCONTINUED | OUTPATIENT
Start: 2019-07-04 | End: 2019-07-07 | Stop reason: HOSPADM

## 2019-07-04 RX ORDER — LATANOPROST 50 UG/ML
1 SOLUTION/ DROPS OPHTHALMIC AT BEDTIME
Status: DISCONTINUED | OUTPATIENT
Start: 2019-07-04 | End: 2019-07-07 | Stop reason: HOSPADM

## 2019-07-04 RX ORDER — HEPARIN SODIUM,PORCINE 10 UNIT/ML
5-10 VIAL (ML) INTRAVENOUS
Status: DISCONTINUED | OUTPATIENT
Start: 2019-07-04 | End: 2019-07-07 | Stop reason: HOSPADM

## 2019-07-04 RX ORDER — HEPARIN SODIUM (PORCINE) LOCK FLUSH IV SOLN 100 UNIT/ML 100 UNIT/ML
5 SOLUTION INTRAVENOUS
Status: DISCONTINUED | OUTPATIENT
Start: 2019-07-04 | End: 2019-07-07 | Stop reason: HOSPADM

## 2019-07-04 RX ORDER — HEPARIN SODIUM,PORCINE 10 UNIT/ML
5-10 VIAL (ML) INTRAVENOUS EVERY 24 HOURS
Status: DISCONTINUED | OUTPATIENT
Start: 2019-07-04 | End: 2019-07-07 | Stop reason: HOSPADM

## 2019-07-04 RX ADMIN — PHYTONADIONE 2 MG: 2 INJECTION, EMULSION INTRAMUSCULAR; INTRAVENOUS; SUBCUTANEOUS at 13:29

## 2019-07-04 RX ADMIN — Medication 5 ML: at 14:01

## 2019-07-04 RX ADMIN — Medication 5 ML: at 22:09

## 2019-07-04 RX ADMIN — BRINZOLAMIDE/BRIMONIDINE TARTRATE 1 DROP: 10; 2 SUSPENSION/ DROPS OPHTHALMIC at 20:12

## 2019-07-04 RX ADMIN — BRINZOLAMIDE/BRIMONIDINE TARTRATE 1 DROP: 10; 2 SUSPENSION/ DROPS OPHTHALMIC at 13:28

## 2019-07-04 RX ADMIN — Medication 5 ML: at 11:32

## 2019-07-04 RX ADMIN — SODIUM CHLORIDE, PRESERVATIVE FREE 5 ML: 5 INJECTION INTRAVENOUS at 08:41

## 2019-07-04 RX ADMIN — Medication 5 ML: at 20:28

## 2019-07-04 RX ADMIN — ACYCLOVIR 400 MG: 400 TABLET ORAL at 13:58

## 2019-07-04 RX ADMIN — ASPIRIN 325 MG ORAL TABLET 325 MG: 325 PILL ORAL at 08:33

## 2019-07-04 RX ADMIN — LATANOPROST 1 DROP: 50 SOLUTION/ DROPS OPHTHALMIC at 21:26

## 2019-07-04 RX ADMIN — ACYCLOVIR 400 MG: 400 TABLET ORAL at 20:12

## 2019-07-04 RX ADMIN — PHYTONADIONE 2 MG: 2 INJECTION, EMULSION INTRAMUSCULAR; INTRAVENOUS; SUBCUTANEOUS at 21:30

## 2019-07-04 ASSESSMENT — ACTIVITIES OF DAILY LIVING (ADL)
ADLS_ACUITY_SCORE: 33

## 2019-07-04 ASSESSMENT — MIFFLIN-ST. JEOR: SCORE: 1011.88

## 2019-07-04 NOTE — PROGRESS NOTES
[facility]    Medicine Progress Note - Hospitalist Service       Date of Admission:  7/3/2019  Assessment & Plan   Amira Arreola is a 86 year old female admitted on 7/3/2019.  Presents from cardiology clinic where noted to have hypotension and AMS.  Complicated history of chronic atrial fibrillation anticoagulated on warfarin, hypertension, chronic diastolic heart failure, pulmonary hypertension, SVT, metastatic multiple myeloma.  Admitted to [facility] from 6/28- 7/1/2019 for atrial fibrillation with RVR, acute on chronic diastolic heart failure, bilateral pleural effusions (no thora completed).     Metabolic encephalopathy  Suspect due to combination hypovolemia and hypercalcemia (corrected calcium level 11).  Afebrile without leukocytosis, UA unremarkable, CXR unchanged from prior.  Received 500 ml NS 7/3 with marked improvement in mentation 7/4.  - family reporting she is near baseline mental status, monitor  - blood cultures ngtd    Hypercalcemia  Corrected calcium level of 11 at admission.  - BMP and ionized calcium pending this AM, will monitor  - will discuss possible bisphosphonate with Oncology  - discontinue PTA vitamin D    Chronic atrial fibrillation  HTN  Chronic diastolic heart failure  Severe pulmonary HTN  Hx of SVT  [PTA diltiazem, metoprolol succinate, Lasix, Coumadin]  Two recent hospitalizations for A-fib with RVR and acute on chronic diastolic CHF.  Presented to cardiology follow up appt 7/3 and found to be hypotensive to 60's systolic, likely due to combination of CCB and BB.  In A-fib with RVR at arrival, serial trop negative.  Most recent TTE 3/2019 shows LVEF 55-60% with moderate MR and TR, severe pulmonary hypertension.  - prn metoprolol IV  - rates currently well controlled, will hold on ordering oral BB or CCB  - PTA lasix held due to hypovolemia  - cardiology recs appreciated; family questioning if ablation and PPM an option given her difficult to control rates  - INR  reversal as above     Left pleural effusion  Admission CXR with left pleural effusion, largely unchanged from prior.  No hypoxia and VBG with pO2, low pCO2.  Unclear if malignant vs possible cardiac.  - following discussion with family today, they are in favor of therapeutic and diagnostic thoracentesis; will plan to reverse INR and complete tomorrow     Goals of Care  Metastatic Multiple myeloma   Deconditioning  Follows with  of oncology, chemo is on hold due to her continued physical decline.  Per family, patient has been showing signs of an underlying dementia, she becomes more confused in the evening hours, and is unable to care for herself at home.  - palliative care assistance regarding goals of care much appreciated  - met with patient, her  (retired physician), 3 daughters (one of whom is an ophthalmologist), 2 sons for family conference 7/4.  Discussed suspected reasons for encephalopathy, noting she has experienced significant improvement overnight.  Discussed that we may be able to offer some interventions to correct volume status and hypercalcemia, would expect continued decline in the future due to her multiple myeloma.  They are understanding of this and plan to have further discussion as a family regarding long term goals of care, however, they wish to pursue active treatments at this time.  Specific questions they have for specialists include whether there is an alternative chemotherapeutic options for her MM and what is the current status of her MM (I.e. Should we repeat SPEP or other diagnostics).  Given the recent difficulty with rate control, question if ablation and PPM is an option.         Diet: 2 Gram Sodium Diet  Room Service    DVT Prophylaxis: Pneumatic Compression Devices  Russo Catheter: not present  Code Status: DNR/DNI      Disposition Plan   Expected discharge: 2 - 3 days, recommended to TCU vs home once work-up complete, heart rates controlled with oral meds,  disposition plan in place.  Entered: Addy Goins MD 07/04/2019, 11:28 AM       The patient's care was discussed with the Bedside Nurse, Patient and Patient's Family.    Addy Goins MD  Hospitalist Service  [facility]    ______________________________________________________________________    Interval History   Patient reports feeling much better today, denies any confusion.  Denies any pain or dyspnea, no fever/chills.  No chest pain/pressure or other complaints.    Data reviewed today: I reviewed all medications, new labs and imaging results over the last 24 hours. I personally reviewed no images or EKG's today.    Physical Exam   Vital Signs: Temp: 97.6  F (36.4  C) Temp src: Oral BP: 115/78 Pulse: 92 Heart Rate: 125 Resp: 16 SpO2: 91 % O2 Device: None (Room air) Oxygen Delivery: 3 LPM  Weight: 125 lbs 14.12 oz  General Appearance: Well developed, well nourished elderly female in NAD  Respiratory: diminished left basilar lung sounds, no wheezing or crackles, no tachypnea  Cardiovascular: irregular rhythm, normal rate, normal s1/s2  GI: abdomen soft, normal bowel sounds, nontender, nondistended  Lymph:  No peripheral edema  Other: Alert and appropriate, cranial nerves grossly intact     Data   Recent Labs   Lab 07/03/19  2045 07/03/19  1932 07/03/19  1319 07/03/19  0925 07/03/19  0923 07/01/19  1058 07/01/19  0525 06/30/19  0545  06/28/19  2114   WBC  --   --   --  9.4  --   --   --   --   --  7.2   HGB  --   --   --  13.2  --   --   --   --   --  12.9   MCV  --   --   --  96  --   --   --   --   --  96   PLT  --   --   --  208  --   --   --   --   --  199   INR  --   --   --   --  2.17*  --  2.11* 2.24*   < > 3.89*   NA  --   --   --  139  --  138  --   --   --  138   POTASSIUM  --   --   --  4.7  --  3.7  --   --   --  3.6   CHLORIDE  --   --   --  105  --  104  --   --   --  104   CO2  --   --   --  27  --  30  --   --   --  28   BUN  --   --   --  19  --  17  --   --   --  22   CR  --   --    --  0.70  --  0.57  --   --   --  0.70   ANIONGAP  --   --   --  7  --  4  --   --   --  6   BRADLEY  --   --   --  9.7  --  8.9  --   --   --  9.0   GLC  --   --   --  126*  --  107*  --   --   --  140*   ALBUMIN 2.4*  --   --   --   --   --   --   --   --  3.1*   PROTTOTAL 5.2*  --   --   --   --   --   --   --   --  6.2*   BILITOTAL 0.6  --   --   --   --   --   --   --   --  0.5   ALKPHOS 52  --   --   --   --   --   --   --   --  62   ALT 32  --   --   --   --   --   --   --   --  79*   AST 22  --   --   --   --   --   --   --   --  48*   TROPI  --  <0.015 <0.015 <0.015  --   --   --  <0.015  --  <0.015    < > = values in this interval not displayed.

## 2019-07-04 NOTE — PLAN OF CARE
Heart Failure Care Pathway  GOALS TO BE MET BEFORE DISCHARGE:    1. Decrease congestion and/or edema with diuretic therapy to achieve near      optimal volume status.            Dyspnea improved:  Yes            Edema improved:     No, please explain: same         Net I/O and Weights since admission:          06/03 2300 - 07/03 2259  In: 799.17 [P.O.:400; I.V.:399.17]  Out: -   Net: 799.17            Vitals:    07/03/19 0929 07/03/19 1322   Weight: 56.7 kg (125 lb) 55.7 kg (122 lb 14.4 oz)         2.  O2 sats > 92% on RA or at prior home O2 therapy level.          Current oxygenation status:       SpO2: 95 %         O2 Device: None (Room air),  Oxygen Delivery: 3 LPM         Able to wean O2 this shift to keep sats > 92%:  Yes       Does patient use Home O2? No    3.  Tolerates ambulation and mobility near baseline: No, please explain: 2 assist         How many times did the patient ambulate with nursing staff this shift? 0    Please review the Heart Failure Care Pathway for additional HF goal outcomes.    Ariane Bello RN  7/3/2019

## 2019-07-04 NOTE — PLAN OF CARE
CR/PT: Orders received, chart reviewed, pt with change in mental status overnight, plan is for family conference to discuss goals of care. Will defer PT/CR evaluation at this time.

## 2019-07-04 NOTE — PROGRESS NOTES
"SPIRITUAL HEALTH SERVICES Progress Note  [facility]    Initial visit with pt Amira per pt's request. Pt was alert, but forgetful.     Pt is Sabianism is affiliated with [facility]. Pt wants to be a faithful Sabianism and states, \"My salvation is important.\"   She has three sons and daughters. She addresses that she has good support from her family and Mosque.     Pt feels very tired.  provided prayer and blessings towards pt, and pt smiled at me and appreciated SH service.     I and SH remain available if additional needs arise.       Maddison Duarte  Chaplain Resident    "

## 2019-07-04 NOTE — CODE/RAPID RESPONSE
Brief house TONE note:    Called by nursing staff regarding altered mental status. Patient is hemodynamically stable. As far as mental status, patient appeared lethargic, responds to pain and verbal commands. Patient was non-communicative. Patient had purposeful movement of all extremities and face was symmetrical. After reading H&P this seems to be consistent with her waxing/waning neurologic status. Patient becomes increasingly confused in the evening and has a probable diagnosis of dementia. Refer to my colleagues note (H&P done by INDY Kinney) regarding goals of care conversation and plan to meet with palliative tomorrow.     Please page with any other concerns,     DAYSI Matamoros, CNP  Hospitalist - House TONE  Text Page  [phone])

## 2019-07-04 NOTE — PROVIDER NOTIFICATION
Paged Dr. Cardoso:  FYI lactic acid 2.0.  Received call back and will continue fluids until 0700.  Also discussed hypotension and will continue to monitor and continue fluids.

## 2019-07-04 NOTE — PROGRESS NOTES
RRT called for AMS.  At beginning of shift pt was confused but coverable and followed commands.  At this time pt did not follow commands but had purposeful movement of extremities.  No change in plan of care.

## 2019-07-04 NOTE — PLAN OF CARE
Pt lethargic, oriented x3. Q2H reposition check/change. Pt sat up in chair for breakfast and lunch. IV SL and port heparin locked pt on RA. 1-2 assist. No c/o pain. VSS. Tele: Afib CVR. Will continue to monitor.    Judith Truong RN on 7/4/2019 at 1:37 PM

## 2019-07-04 NOTE — PLAN OF CARE
VSS on RA.  Tele: AFib.  Pt arouses to pain.  Repo q2h overnight.  Incontinent of urine, no BM this shift.  Plan for care conference with family today and possible comfort cares.  Will continue to monitor.      0400: pt opens eyes spontaneously and conversing.

## 2019-07-05 ENCOUNTER — APPOINTMENT (OUTPATIENT)
Dept: GENERAL RADIOLOGY | Facility: CLINIC | Age: 84
DRG: 242 | End: 2019-07-05
Attending: RADIOLOGY
Payer: MEDICARE

## 2019-07-05 ENCOUNTER — APPOINTMENT (OUTPATIENT)
Dept: ULTRASOUND IMAGING | Facility: CLINIC | Age: 84
DRG: 242 | End: 2019-07-05
Attending: HOSPITALIST
Payer: MEDICARE

## 2019-07-05 ENCOUNTER — DOCUMENTATION ONLY (OUTPATIENT)
Dept: OTHER | Facility: CLINIC | Age: 84
End: 2019-07-05

## 2019-07-05 ENCOUNTER — SURGERY (OUTPATIENT)
Age: 84
End: 2019-07-05
Payer: MEDICARE

## 2019-07-05 LAB
ANION GAP SERPL CALCULATED.3IONS-SCNC: 5 MMOL/L (ref 3–14)
APPEARANCE FLD: NORMAL
BUN SERPL-MCNC: 34 MG/DL (ref 7–30)
CA-I BLD-MCNC: 4.9 MG/DL (ref 4.4–5.2)
CALCIUM SERPL-MCNC: 8.4 MG/DL (ref 8.5–10.1)
CHLORIDE SERPL-SCNC: 108 MMOL/L (ref 94–109)
CO2 SERPL-SCNC: 28 MMOL/L (ref 20–32)
COLOR FLD: YELLOW
CREAT SERPL-MCNC: 0.58 MG/DL (ref 0.52–1.04)
ERYTHROCYTE [DISTWIDTH] IN BLOOD BY AUTOMATED COUNT: 18.2 % (ref 10–15)
GFR SERPL CREATININE-BSD FRML MDRD: 83 ML/MIN/{1.73_M2}
GLUCOSE FLD-MCNC: 148 MG/DL
GLUCOSE SERPL-MCNC: 147 MG/DL (ref 70–99)
GRAM STN SPEC: NORMAL
HCT VFR BLD AUTO: 32.3 % (ref 35–47)
HGB BLD-MCNC: 10.8 G/DL (ref 11.7–15.7)
INR PPP: 1.29 (ref 0.86–1.14)
LDH FLD L TO P-CCNC: 118 U/L
LDH SERPL L TO P-CCNC: 214 U/L (ref 81–234)
LYMPHOCYTES NFR FLD MANUAL: 83 %
MCH RBC QN AUTO: 32 PG (ref 26.5–33)
MCHC RBC AUTO-ENTMCNC: 33.4 G/DL (ref 31.5–36.5)
MCV RBC AUTO: 96 FL (ref 78–100)
MONOS+MACROS NFR FLD MANUAL: 5 %
NEUTS BAND NFR FLD MANUAL: 8 %
OTHER CELLS FLD MANUAL: 4 %
PLATELET # BLD AUTO: 212 10E9/L (ref 150–450)
POTASSIUM SERPL-SCNC: 3.9 MMOL/L (ref 3.4–5.3)
PROCALCITONIN SERPL-MCNC: 0.07 NG/ML
PROT FLD-MCNC: 1.7 G/DL
PROT SERPL-MCNC: 5.7 G/DL (ref 6.8–8.8)
RBC # BLD AUTO: 3.37 10E12/L (ref 3.8–5.2)
SODIUM SERPL-SCNC: 141 MMOL/L (ref 133–144)
SPECIMEN SOURCE FLD: NORMAL
SPECIMEN SOURCE: NORMAL
WBC # BLD AUTO: 11.9 10E9/L (ref 4–11)
WBC # FLD AUTO: 692 /UL

## 2019-07-05 PROCEDURE — 99233 SBSQ HOSP IP/OBS HIGH 50: CPT | Performed by: INTERNAL MEDICINE

## 2019-07-05 PROCEDURE — 0W9B3ZZ DRAINAGE OF LEFT PLEURAL CAVITY, PERCUTANEOUS APPROACH: ICD-10-PCS | Performed by: RADIOLOGY

## 2019-07-05 PROCEDURE — 40000863 ZZH STATISTIC RADIOLOGY XRAY, US, CT, MAR, NM

## 2019-07-05 PROCEDURE — 99152 MOD SED SAME PHYS/QHP 5/>YRS: CPT | Performed by: INTERNAL MEDICINE

## 2019-07-05 PROCEDURE — 25000132 ZZH RX MED GY IP 250 OP 250 PS 637: Mod: GY | Performed by: NURSE PRACTITIONER

## 2019-07-05 PROCEDURE — C1786 PMKR, SINGLE, RATE-RESP: HCPCS | Performed by: INTERNAL MEDICINE

## 2019-07-05 PROCEDURE — 33207 INSERT HEART PM VENTRICULAR: CPT | Mod: KX | Performed by: INTERNAL MEDICINE

## 2019-07-05 PROCEDURE — 02583ZZ DESTRUCTION OF CONDUCTION MECHANISM, PERCUTANEOUS APPROACH: ICD-10-PCS | Performed by: INTERNAL MEDICINE

## 2019-07-05 PROCEDURE — 25800030 ZZH RX IP 258 OP 636: Performed by: HOSPITALIST

## 2019-07-05 PROCEDURE — 27210794 ZZH OR GENERAL SUPPLY STERILE: Performed by: INTERNAL MEDICINE

## 2019-07-05 PROCEDURE — 25000128 H RX IP 250 OP 636: Performed by: INTERNAL MEDICINE

## 2019-07-05 PROCEDURE — 82330 ASSAY OF CALCIUM: CPT | Performed by: HOSPITALIST

## 2019-07-05 PROCEDURE — 00000102 ZZHCL STATISTIC CYTO WRIGHT STAIN TC: Performed by: HOSPITALIST

## 2019-07-05 PROCEDURE — 84155 ASSAY OF PROTEIN SERUM: CPT | Performed by: HOSPITALIST

## 2019-07-05 PROCEDURE — 85610 PROTHROMBIN TIME: CPT | Performed by: HOSPITALIST

## 2019-07-05 PROCEDURE — C1894 INTRO/SHEATH, NON-LASER: HCPCS | Performed by: INTERNAL MEDICINE

## 2019-07-05 PROCEDURE — 25000125 ZZHC RX 250: Performed by: PHYSICIAN ASSISTANT

## 2019-07-05 PROCEDURE — 36415 COLL VENOUS BLD VENIPUNCTURE: CPT | Performed by: HOSPITALIST

## 2019-07-05 PROCEDURE — C1733 CATH, EP, OTHR THAN COOL-TIP: HCPCS | Performed by: INTERNAL MEDICINE

## 2019-07-05 PROCEDURE — 25000125 ZZHC RX 250: Performed by: INTERNAL MEDICINE

## 2019-07-05 PROCEDURE — 25000125 ZZHC RX 250: Performed by: HOSPITALIST

## 2019-07-05 PROCEDURE — 83615 LACTATE (LD) (LDH) ENZYME: CPT | Performed by: HOSPITALIST

## 2019-07-05 PROCEDURE — 93650 ICAR CATH ABLTJ AV NODE FUNC: CPT | Performed by: INTERNAL MEDICINE

## 2019-07-05 PROCEDURE — C1898 LEAD, PMKR, OTHER THAN TRANS: HCPCS | Performed by: INTERNAL MEDICINE

## 2019-07-05 PROCEDURE — 88112 CYTOPATH CELL ENHANCE TECH: CPT | Mod: 26 | Performed by: HOSPITALIST

## 2019-07-05 PROCEDURE — 88305 TISSUE EXAM BY PATHOLOGIST: CPT | Mod: 26 | Performed by: HOSPITALIST

## 2019-07-05 PROCEDURE — 99222 1ST HOSP IP/OBS MODERATE 55: CPT | Mod: 57 | Performed by: INTERNAL MEDICINE

## 2019-07-05 PROCEDURE — 99233 SBSQ HOSP IP/OBS HIGH 50: CPT | Performed by: HOSPITALIST

## 2019-07-05 PROCEDURE — 02HK3JZ INSERTION OF PACEMAKER LEAD INTO RIGHT VENTRICLE, PERCUTANEOUS APPROACH: ICD-10-PCS | Performed by: INTERNAL MEDICINE

## 2019-07-05 PROCEDURE — 00000155 ZZHCL STATISTIC H-CELL BLOCK W/STAIN: Performed by: HOSPITALIST

## 2019-07-05 PROCEDURE — 87070 CULTURE OTHR SPECIMN AEROBIC: CPT | Performed by: HOSPITALIST

## 2019-07-05 PROCEDURE — 85027 COMPLETE CBC AUTOMATED: CPT | Performed by: HOSPITALIST

## 2019-07-05 PROCEDURE — 88305 TISSUE EXAM BY PATHOLOGIST: CPT | Performed by: HOSPITALIST

## 2019-07-05 PROCEDURE — 82945 GLUCOSE OTHER FLUID: CPT | Performed by: HOSPITALIST

## 2019-07-05 PROCEDURE — 25000128 H RX IP 250 OP 636: Performed by: HOSPITALIST

## 2019-07-05 PROCEDURE — 89051 BODY FLUID CELL COUNT: CPT | Performed by: HOSPITALIST

## 2019-07-05 PROCEDURE — 88112 CYTOPATH CELL ENHANCE TECH: CPT | Performed by: HOSPITALIST

## 2019-07-05 PROCEDURE — G0463 HOSPITAL OUTPT CLINIC VISIT: HCPCS

## 2019-07-05 PROCEDURE — 80048 BASIC METABOLIC PNL TOTAL CA: CPT | Performed by: HOSPITALIST

## 2019-07-05 PROCEDURE — 21000001 ZZH R&B HEART CARE

## 2019-07-05 PROCEDURE — 40000986 XR CHEST 1 VW

## 2019-07-05 PROCEDURE — 99153 MOD SED SAME PHYS/QHP EA: CPT | Performed by: INTERNAL MEDICINE

## 2019-07-05 PROCEDURE — 84145 PROCALCITONIN (PCT): CPT | Performed by: HOSPITALIST

## 2019-07-05 PROCEDURE — 93650 ICAR CATH ABLTJ AV NODE FUNC: CPT | Mod: 51 | Performed by: INTERNAL MEDICINE

## 2019-07-05 PROCEDURE — 87015 SPECIMEN INFECT AGNT CONCNTJ: CPT | Performed by: HOSPITALIST

## 2019-07-05 PROCEDURE — 25000125 ZZHC RX 250: Performed by: RADIOLOGY

## 2019-07-05 PROCEDURE — 0JH605Z INSERTION OF PACEMAKER, SINGLE CHAMBER RATE RESPONSIVE INTO CHEST SUBCUTANEOUS TISSUE AND FASCIA, OPEN APPROACH: ICD-10-PCS | Performed by: INTERNAL MEDICINE

## 2019-07-05 PROCEDURE — 84157 ASSAY OF PROTEIN OTHER: CPT | Performed by: HOSPITALIST

## 2019-07-05 PROCEDURE — 25000132 ZZH RX MED GY IP 250 OP 250 PS 637: Mod: GY | Performed by: HOSPITALIST

## 2019-07-05 PROCEDURE — 32555 ASPIRATE PLEURA W/ IMAGING: CPT

## 2019-07-05 PROCEDURE — 87205 SMEAR GRAM STAIN: CPT | Performed by: HOSPITALIST

## 2019-07-05 PROCEDURE — 25800029 ZZH RX IP 258 OP 250: Performed by: INTERNAL MEDICINE

## 2019-07-05 DEVICE — PACEMAKER ACCOLADE SR MRI: Type: IMPLANTABLE DEVICE | Status: FUNCTIONAL

## 2019-07-05 DEVICE — IMPLANTABLE DEVICE: Type: IMPLANTABLE DEVICE | Status: FUNCTIONAL

## 2019-07-05 RX ORDER — NICOTINE POLACRILEX 4 MG
15-30 LOZENGE BUCCAL
Status: DISCONTINUED | OUTPATIENT
Start: 2019-07-05 | End: 2019-07-07 | Stop reason: HOSPADM

## 2019-07-05 RX ORDER — LIDOCAINE 40 MG/G
CREAM TOPICAL
Status: CANCELLED | OUTPATIENT
Start: 2019-07-05

## 2019-07-05 RX ORDER — CLINDAMYCIN PHOSPHATE 900 MG/50ML
900 INJECTION, SOLUTION INTRAVENOUS
Status: COMPLETED | OUTPATIENT
Start: 2019-07-05 | End: 2019-07-05

## 2019-07-05 RX ORDER — WARFARIN SODIUM 4 MG/1
4 TABLET ORAL
Status: COMPLETED | OUTPATIENT
Start: 2019-07-05 | End: 2019-07-05

## 2019-07-05 RX ORDER — LIDOCAINE 40 MG/G
CREAM TOPICAL
Status: DISCONTINUED | OUTPATIENT
Start: 2019-07-05 | End: 2019-07-05 | Stop reason: HOSPADM

## 2019-07-05 RX ORDER — LIDOCAINE 40 MG/G
CREAM TOPICAL
Status: DISCONTINUED | OUTPATIENT
Start: 2019-07-05 | End: 2019-07-07 | Stop reason: HOSPADM

## 2019-07-05 RX ORDER — NALOXONE HYDROCHLORIDE 0.4 MG/ML
.1-.4 INJECTION, SOLUTION INTRAMUSCULAR; INTRAVENOUS; SUBCUTANEOUS
Status: DISCONTINUED | OUTPATIENT
Start: 2019-07-05 | End: 2019-07-07 | Stop reason: HOSPADM

## 2019-07-05 RX ORDER — FENTANYL CITRATE 50 UG/ML
INJECTION, SOLUTION INTRAMUSCULAR; INTRAVENOUS
Status: DISCONTINUED | OUTPATIENT
Start: 2019-07-05 | End: 2019-07-05 | Stop reason: HOSPADM

## 2019-07-05 RX ORDER — OXYCODONE AND ACETAMINOPHEN 5; 325 MG/1; MG/1
1 TABLET ORAL EVERY 4 HOURS PRN
Status: DISCONTINUED | OUTPATIENT
Start: 2019-07-05 | End: 2019-07-07 | Stop reason: HOSPADM

## 2019-07-05 RX ORDER — BUPIVACAINE HYDROCHLORIDE 2.5 MG/ML
INJECTION, SOLUTION EPIDURAL; INFILTRATION; INTRACAUDAL
Status: DISCONTINUED | OUTPATIENT
Start: 2019-07-05 | End: 2019-07-05 | Stop reason: HOSPADM

## 2019-07-05 RX ORDER — SODIUM CHLORIDE 450 MG/100ML
INJECTION, SOLUTION INTRAVENOUS CONTINUOUS
Status: DISCONTINUED | OUTPATIENT
Start: 2019-07-05 | End: 2019-07-05 | Stop reason: HOSPADM

## 2019-07-05 RX ORDER — DEXTROSE MONOHYDRATE 25 G/50ML
25-50 INJECTION, SOLUTION INTRAVENOUS
Status: DISCONTINUED | OUTPATIENT
Start: 2019-07-05 | End: 2019-07-07 | Stop reason: HOSPADM

## 2019-07-05 RX ORDER — SODIUM CHLORIDE 450 MG/100ML
INJECTION, SOLUTION INTRAVENOUS CONTINUOUS
Status: CANCELLED | OUTPATIENT
Start: 2019-07-05

## 2019-07-05 RX ORDER — LIDOCAINE HYDROCHLORIDE 10 MG/ML
10 INJECTION, SOLUTION EPIDURAL; INFILTRATION; INTRACAUDAL; PERINEURAL ONCE
Status: COMPLETED | OUTPATIENT
Start: 2019-07-05 | End: 2019-07-05

## 2019-07-05 RX ADMIN — WARFARIN SODIUM 4 MG: 4 TABLET ORAL at 17:21

## 2019-07-05 RX ADMIN — LIDOCAINE HYDROCHLORIDE 10 ML: 10 INJECTION, SOLUTION EPIDURAL; INFILTRATION; INTRACAUDAL; PERINEURAL at 14:22

## 2019-07-05 RX ADMIN — FENTANYL CITRATE 25 MCG: 50 INJECTION, SOLUTION INTRAMUSCULAR; INTRAVENOUS at 14:42

## 2019-07-05 RX ADMIN — ACYCLOVIR 400 MG: 400 TABLET ORAL at 08:43

## 2019-07-05 RX ADMIN — ACYCLOVIR 400 MG: 400 TABLET ORAL at 17:20

## 2019-07-05 RX ADMIN — MIDAZOLAM 0.5 MG: 1 INJECTION INTRAMUSCULAR; INTRAVENOUS at 14:22

## 2019-07-05 RX ADMIN — Medication 5 ML: at 17:22

## 2019-07-05 RX ADMIN — METOPROLOL TARTRATE 2.5 MG: 5 INJECTION INTRAVENOUS at 06:05

## 2019-07-05 RX ADMIN — FENTANYL CITRATE 50 MCG: 50 INJECTION, SOLUTION INTRAMUSCULAR; INTRAVENOUS at 14:10

## 2019-07-05 RX ADMIN — MIDAZOLAM 1 MG: 1 INJECTION INTRAMUSCULAR; INTRAVENOUS at 14:10

## 2019-07-05 RX ADMIN — BUPIVACAINE HYDROCHLORIDE 10 ML: 2.5 INJECTION, SOLUTION EPIDURAL; INFILTRATION; INTRACAUDAL at 14:22

## 2019-07-05 RX ADMIN — SODIUM CHLORIDE, PRESERVATIVE FREE 10 ML: 5 INJECTION INTRAVENOUS at 06:06

## 2019-07-05 RX ADMIN — Medication 25000 UNITS: at 14:32

## 2019-07-05 RX ADMIN — CLINDAMYCIN PHOSPHATE 900 MG: 900 INJECTION, SOLUTION INTRAVENOUS at 13:35

## 2019-07-05 RX ADMIN — DILTIAZEM HYDROCHLORIDE 5 MG/HR: 5 INJECTION INTRAVENOUS at 08:43

## 2019-07-05 RX ADMIN — MIDAZOLAM 0.5 MG: 1 INJECTION INTRAMUSCULAR; INTRAVENOUS at 14:42

## 2019-07-05 RX ADMIN — SODIUM CHLORIDE: 4.5 INJECTION, SOLUTION INTRAVENOUS at 13:34

## 2019-07-05 RX ADMIN — LIDOCAINE HYDROCHLORIDE 7 ML: 10 INJECTION, SOLUTION EPIDURAL; INFILTRATION; INTRACAUDAL; PERINEURAL at 11:13

## 2019-07-05 RX ADMIN — FENTANYL CITRATE 25 MCG: 50 INJECTION, SOLUTION INTRAMUSCULAR; INTRAVENOUS at 14:22

## 2019-07-05 RX ADMIN — ASPIRIN 325 MG ORAL TABLET 325 MG: 325 PILL ORAL at 12:36

## 2019-07-05 ASSESSMENT — ACTIVITIES OF DAILY LIVING (ADL)
ADLS_ACUITY_SCORE: 33
ADLS_ACUITY_SCORE: 32
ADLS_ACUITY_SCORE: 33
ADLS_ACUITY_SCORE: 32
ADLS_ACUITY_SCORE: 32
ADLS_ACUITY_SCORE: 33

## 2019-07-05 ASSESSMENT — MIFFLIN-ST. JEOR: SCORE: 1021.94

## 2019-07-05 NOTE — PROGRESS NOTES
[facility]    Hematology/Oncology Follow-up Note      Today's Date: 07/05/19  Date of Admission:  7/3/2019  Reason for Consult: kappa free light chain multiple myeloma      ASSESSMENT/PLAN:  Amira Arreola is a 86 year old female with kappa free light chain multiple myeloma.  She is a patient of Dr. Roberts.  She had kappa free light chain of 50 in 2016, down to 1.21 in May 2019.  She was on treatment with daratumumab, Velcade, and dexamethasone, disease responded well to it.  She recently saw Dr. Roberts in June 2019 and due to worsening fatigue and weakness, her chemotherapy as put on hold and see how she did with a break.    She was admitted from cardiology clinic due to hypotension and AMS, found to be in atrial fibrillation with RVT, acute on chronic diastolic heart failure, bilateral pleural effusions.  She has history of chronic atrial fibrillation on warfarin, HTN, chronic diastolic heart failure, pulmonary hjypertension, SVT.    Initially,  Patient was doing quite poorly, and there was discussion of hospice. However, over the last day or two, the patient seemed to improve, and now she and family wish to pursue restorative therapies.      She is being followed closely by cardiology and getting ablation and PPM today.  We discussed that if she continues to feel better and functional status improves, there are potential options for treatments if she wishes to pursue them.  Her disease seemed to be have been well-controlled on treatment prior to her break.  We will repeat a serum free light chain today and reassess her disease status.  If it remains stable, she may consider continuing her treatment break.  If there is progression of disease, she might consider resuming her prior treatment.  If she progresses on treatment, there are other medication options available for multiple myeloma.      For now, she will focus on recovery from her cardiology issues.  She will follow-up with Dr. Roberts  and further discuss her goals and treatment plan/options.  She and her family were satisfied with this.          INTERIM HISTORY: Patient was seen in her room today.  She says that she has been feeling better the last couple of days.  She is planning to get ablation and pacemaker today.       MEDICATIONS:  Current Facility-Administered Medications   Medication     acetaminophen (TYLENOL) Suppository 650 mg     acetaminophen (TYLENOL) tablet 650 mg     acyclovir (ZOVIRAX) tablet 400 mg     alum & mag hydroxide-simethicone (MYLANTA ES/MAALOX  ES) suspension 30 mL     aspirin (ASA) tablet 325 mg     bisacodyl (DULCOLAX) Suppository 10 mg     brinzolamide-brimonidine (SIMBRINZA) 1-0.2 % ophthalmic suspension 1 drop     Continuing beta blocker from home medication list OR beta blocker order already placed during this visit     Continuing statin from home medication list OR statin order already placed during this visit     Contraindication to heparin or Low Molecular Weight Heparin     glucose gel 15-30 g    Or     dextrose 50 % injection 25-50 mL    Or     glucagon injection 1 mg     diltiazem (CARDIZEM) 125 mg in sodium chloride 0.9 % 125 mL infusion     heparin 100 UNIT/ML injection 5 mL     heparin lock flush 10 UNIT/ML injection 5-10 mL     heparin lock flush 10 UNIT/ML injection 5-10 mL     latanoprost (XALATAN) 0.005 % ophthalmic solution 1 drop     lidocaine (LMX4) cream     lidocaine 1 % 0.1-1 mL     medication instruction     metoprolol (LOPRESSOR) injection 2.5 mg     morphine (PF) injection 1 mg     naloxone (NARCAN) injection 0.1-0.4 mg     naloxone (NARCAN) injection 0.1-0.4 mg     ondansetron (ZOFRAN-ODT) ODT tab 4 mg    Or     ondansetron (ZOFRAN) injection 4 mg     oxyCODONE-acetaminophen (PERCOCET) 5-325 MG per tablet 1 tablet     Patient is already receiving anticoagulation with heparin, enoxaparin (LOVENOX), warfarin (COUMADIN)  or other anticoagulant medication     Reason ACE/ARB/ARNI order not  "selected     sodium chloride (PF) 0.9% PF flush 10-20 mL     sodium chloride (PF) 0.9% PF flush 10-20 mL     sodium chloride (PF) 0.9% PF flush 3 mL     sodium chloride (PF) 0.9% PF flush 3 mL     warfarin (COUMADIN) tablet 4 mg     Warfarin Therapy Reminder (Check START DATE - warfarin may be starting in the FUTURE)           ALLERGIES:  Allergies   Allergen Reactions     Blood Transfusion Related (Informational Only)      Blood antigens related to receiving Darzelex-AG     Penicillin [Penicillins] Rash     Blotches on chest          PHYSICAL EXAM:  Vital signs:  Temp: 98  F (36.7  C) Temp src: Oral BP: 120/72 Pulse: 112 Heart Rate: 80 Resp: 16 SpO2: 97 % O2 Device: None (Room air) Oxygen Delivery: 2 LPM Height: 165.1 cm (5' 5\") Weight: 58.1 kg (128 lb 1.6 oz)  Estimated body mass index is 21.32 kg/m  as calculated from the following:    Height as of this encounter: 1.651 m (5' 5\").    Weight as of this encounter: 58.1 kg (128 lb 1.6 oz).    GENERAL/CONSTITUTIONAL: No acute distress.  and daughter at bedside.  EYES: No scleral icterus.  NEUROLOGIC: Alert, oriented, answers questions appropriately.  INTEGUMENTARY: No jaundice.      LABS:  CBC RESULTS:   Recent Labs   Lab Test 07/05/19  0400   WBC 11.9*   RBC 3.37*   HGB 10.8*   HCT 32.3*   MCV 96   MCH 32.0   MCHC 33.4   RDW 18.2*          Recent Labs   Lab Test 07/05/19  0400 07/04/19  1130    142   POTASSIUM 3.9 3.5   CHLORIDE 108 108   CO2 28 24   ANIONGAP 5 10   * 235*   BUN 34* 35*   CR 0.58 0.79   BRADLEY 8.4* 8.7       I spent a total of 35 minutes with the patient, with over >50% of the time in counseling and/or coordination of care.     Vanna Lanier MD  Hematology/Oncology  [facility]  "

## 2019-07-05 NOTE — PROGRESS NOTES
SPIRITUAL HEALTH SERVICES Progress Note  [facility]    Pt reports that she recovered well from her surgery. Pt was alert and aware, but disoriented. SH offered to visit at a different time and Pt accepted. SH will continue to follow-up per request and LOS.      Evan Meester   Intern  Pager:  [phone]

## 2019-07-05 NOTE — CONSULTS
Care Transition Initial Assessment -      Met with: Patient,  Tom, and daughter Desiree  Active Problems:    ACS (acute coronary syndrome) (H)       DATA  Lives With: spouse      Quality of Family Relationships: involved, supportive  Description of Support System: Supportive, Involved  Who is your support system?:   Support Assessment: Adequate family and caregiver support.   Identified issues/concerns regarding health management:       Quality of Family Relationships: involved, supportive     Per care transitions consult for discharge planning and tcu placement on discharge.  Patient was admitted on 7-3-19 with acute coronary syndrome.  The tentative date of discharge is 7-6-19 or 7-7-19.  Reviewed chart and spoke with patient, , and daughter Desiree regarding discharge plans.  Per patient and daughter report, patient lives with her  in a house with 6 steps to enter.  Once inside patient can remain on the main level.  Patient and family are hoping that patient can go to tcu on discharge.  Patient has been to [facility] in the past and this would be the first choice.  Referrals sent, via discharge on the double, to check bed a availability.  Received a call back from [facility].  They have a bed available for patient Saturday or Sunday.    ASSESSMENT  Cognitive Status:  awake and alert  Concerns to be addressed: discharge planning, tcu placement on discharge.     PLAN  Financial costs for the patient includes N/A.  Patient given options and choices for discharge TCU choices.  Patient/family is agreeable to the plan?  Yes  Transportation/person available to transport on day of discharge  is TBD and have they been notified/set up TBD  Patient Goals and Preferences: TCU placement on discharge.  Patient anticipates discharging to:  [facility].    Will continue to follow and assist with a safe discharge plan.    ALIE Arcos, [facility]  [phone]

## 2019-07-05 NOTE — PLAN OF CARE
A/o x3. Forgetful at times, reorientation provided. AFIB CVR. Denies CP or SOB. VSS. RA. Port Hep locked. Cardiac diet.  Up in chair for dinner with a/1 pivot. Good appetite, tray set up. Passing flatus on BSC. New Purewick  placed at 2000 with good out put with low suction. Denies pain. Heels pink blanchable improving, elevate on rook boots and pillows. Bottom red/pink blanchable cleansed with jordan spray. Continue turn and repo q2. Up in chair again at end of shift tolerating well. PCU collect INR due at 0400. Plan for a thoracentesis tomorrow. PT to see tomorrow.

## 2019-07-05 NOTE — PLAN OF CARE
VSS. Tele: A fib RVR- dilt gtt started this AM. Left sode thora 250 ml out. AV node ablation and PPM this afternoon. Site CDI. Tele: Paced post procedure. CXr and interrogation in AM. [facility] has bed for sat/sun discharge. Family supportive at bedside and updated on plan of care.

## 2019-07-05 NOTE — PROGRESS NOTES
Request from hospitalist team to see patient for persistent AF with RVR. She has been admitted with hypotension and NOT been getting her Metoprolol and now on IV diltiazem for her AF. Family requesting consultation for possible AV solomon ablation and PPM. No plans for hospice at this time per primary team and family. Will request Dr. Menchaca with EP to provide opinion on candidacy for this.

## 2019-07-05 NOTE — CONSULTS
"CLINICAL NUTRITION SERVICES  -  ASSESSMENT NOTE    Recommendations Ordered by Registered Dietitian (RD):   Added chocolate milkshake BID between meals    Malnutrition:   % Weight Loss:  > 5% in 1 month (severe malnutrition)  % Intake:  Unable to fully assess  Subcutaneous Fat Loss:  Orbital region moderate depletion and Upper arm region moderate-severe depletion  Muscle Loss:  Temporal region moderate depletion, Clavicle bone region moderate depletion, Acromion bone region moderate depletion and Dorsal hand region moderate depletion  Fluid Retention:  None noted    Malnutrition Diagnosis: Severe malnutrition  In Context of:  Chronic illness or disease     REASON FOR ASSESSMENT  Amira Arreola is a 86 year old female seen by Registered Dietitian for Admission Nutrition Risk Screen for reduced oral intake over the last month    NUTRITION HISTORY  - Information obtained from EMR, patient.  - Afib, heart failure, hypertension, pulmonary hypertension, SVT, multiple myeloma (treatment on hold for July, but plans for treatment next month).   - Presented to ED with hypotension and progressive weakness.   - 2 daughters involved, have been needing to remind patient to eat/drink    - Patient appeared relatively confused during visit, question accuracy of nutr hx.   - Amira feels she was eating okay at home PTA, and denied recent weight loss. When asked about weight loss specifically in the past 2-3 months, did state \"well maybe a little\".   - Drinks boost/ensure at home \"maybe 1 time each week\".   - NKFA    - 3/2019 - received low sodium diet education. Son had been doing grocery shopping and cooking for patient at that time. Unable to confirm if this is still the case.   - Diet recall at that time:   Breakfast:  Granola cereal with 2% milk     Lunch:  1/2 deli meat sandwich on whole wheat bread     Dinner:   Meat and vegetable       CURRENT NUTRITION ORDERS  Diet Order:     2000 mg Sodium     Current " "Intake/Tolerance:  100% intakes recorded. Patient verbalized a good appetite. Meal review shows adequate meals ordered consistently.     NUTRITION FOCUSED PHYSICAL ASSESSMENT (NFPA)  Completed:  Yes Partial assessment - No LEs         Observed:    Muscle wasting (refer to documentation in Malnutrition section) and Subcutaneous fat loss (refer to documentation in Malnutrition section)   Dry skin    Obtained from Chart/Interdisciplinary Team:  - Palliative/hospice meeting to take place during admission   - Flip - Nutrition: 3; total 17  - No edema recorded  - Last BM PTA    ANTHROPOMETRICS  Height: 5' 5\"  Weight: 55.7 kg   Body mass index is 20.5 kg/m .  Weight Status:  Normal BMI  IBW: 56.8 kg  % IBW: 98%  Weight History: ?10# loss in the past month (7.5%). (132# 6/5 --> 122# 7/3)  Wt Readings from Last 10 Encounters:   07/05/19 58.1 kg (128 lb 1.6 oz)   07/01/19 58.9 kg (129 lb 14.4 oz)   06/26/19 60.8 kg (134 lb)   06/05/19 60.3 kg (132 lb 15 oz)   06/05/19 60.3 kg (133 lb)   05/22/19 59.8 kg (131 lb 13.4 oz)   05/22/19 59.8 kg (131 lb 12.8 oz)   05/15/19 60.3 kg (133 lb)   05/08/19 60.2 kg (132 lb 11.5 oz)   05/08/19 60.2 kg (132 lb 12.8 oz)     LABS  Labs reviewed  Recent Labs   Lab Test 07/05/19  0400 07/04/19  1130 07/03/19  0925 07/01/19  1058 06/28/19  2114   POTASSIUM 3.9 3.5 4.7 3.7 3.6     No results for input(s): PHOS in the last 16219 hours.  Recent Labs   Lab Test 03/22/19  2353   MAG 2.0     Recent Labs   Lab Test 07/05/19  0400 07/04/19  1130 07/03/19  0925 07/01/19  1058 06/28/19  2114    142 139 138 138     Recent Labs   Lab Test 07/05/19  0400 07/04/19  1130 07/03/19  0925 07/01/19  1058 06/28/19  2114   CR 0.58 0.79 0.70 0.57 0.70     Recent Labs   Lab 07/05/19  0400 07/04/19  1130 07/03/19  0925 07/01/19  1058 06/28/19  2114   * 235* 126* 107* 140*     No results found for: A1C  Triglycerides   Date Value Ref Range Status   10/12/2016 66 <150 mg/dL Final   09/21/2015 66 0 - 150 " mg/dL Final   09/16/2014 62 0 - 150 mg/dL Final     Comment:     Fasting specimen      MEDICATIONS  Medications reviewed    ASSESSED NUTRITION NEEDS PER APPROVED PRACTICE GUIDELINES:  Dosing Weight 55.7 kg   Estimated Energy Needs: 9249-3323 kcals (25-30 Kcal/Kg)  Justification: maintenance  Estimated Protein Needs: 67-84 grams protein (1.2-1.5 g pro/Kg)  Justification: preservation of lean body mass  Estimated Fluid Needs: >1 mL/kcal or per MD  Justification: maintenance    MALNUTRITION:  % Weight Loss:  > 5% in 1 month (severe malnutrition)  % Intake:  Unable to fully assess  Subcutaneous Fat Loss:  Orbital region moderate depletion and Upper arm region moderate-severe depletion  Muscle Loss:  Temporal region moderate depletion, Clavicle bone region moderate depletion, Acromion bone region moderate depletion and Dorsal hand region moderate depletion  Fluid Retention:  None noted    Malnutrition Diagnosis: Severe malnutrition  In Context of:  Chronic illness or disease    NUTRITION DIAGNOSIS:  Malnutrition related to suspected baseline inadequate oral intake in setting of chronic illness as evidenced by weight loss of up to 10# in the past month, e/o fat and muscle wasting, coding for severe malnutrition.       NUTRITION INTERVENTIONS  Recommendations / Nutrition Prescription  Continue diet as ordered - 2g Na    Add high protein supplements(chocolate shake BID)      Implementation  Nutrition education: Provided education on protein, supplements.   Medical Food Supplement: as above      Nutrition Goals  Patient to tolerate at least 75% meals TID + 1-2 supplements daily.       MONITORING AND EVALUATION:  Progress towards goals will be monitored and evaluated per protocol and Practice Guidelines    Hannah Muniz RD, LD

## 2019-07-05 NOTE — PROGRESS NOTES
[facility] Nurse Inpatient Skin Assessment     Initial Assessment of:   Bilateral heels         Data:   Patient History:      per MD note(s):  Amira Arreola is a 86 year old female admitted on 7/3/2019.  Presents from cardiology clinic where noted to have hypotension and AMS.  Complicated history of chronic atrial fibrillation anticoagulated on warfarin, hypertension, chronic diastolic heart failure, pulmonary hypertension, SVT, metastatic multiple myeloma.      Flip Risk Assessment  Sensory Perception: 3-->slightly limited    Moisture: 3-->occasionally moist   Activity: 3-->walks occasionally     Mobility: 3-->slightly limited   Nutrition: 3-->adequate   Flip Score: 17      Positioning: Pillows; chair cushion ordered today 7-5    Mattress:  Standard , Atmos Air mattress    Moisture Management:  Incontinence Protocol prn for occasional incontinence; Purewick prn    Current Diet / Nutrition:       Orders Placed This Encounter        Advance Diet as Tolerated: Clear Liquid Diet        Labs:   Recent Labs   Lab Test 07/05/19  0400  07/03/19  2045   ALBUMIN  --   --  2.4*   HGB 10.8*  --   --    INR 1.29*   < >  --    WBC 11.9*  --   --     < > = values in this interval not displayed.         Skin Assessment (location):   Bilateral heels and lower legs  History:  Pt with tender red heels yesterday per report.  Pt sitting up in chair at assessment today, feet flat on floor, no concentrated pressure to heels.  Pt able to lift up legs with minimal assist.      Skin: heels appear intact throughout, with just some superficial peeling/cracking of epidermal layers only.  Heels are pink-red but easily blanchable.  Slight softness with palpation, but feels normal for aged, slightly swollen tissue.  Feet, toes, and heels are cold to the touch, and toes are pink-purple, slightly dusky but blanchable.   Skin intact on lower legs.      Color: pink    Temperature  cool    Drainage:  n/a    Pain:   Denies    Coccyx/buttocks:  Not assessed, per nursing is intact but a little red.  Family reports slight irritation from moisture/incontinence.  Discussed PIP measures, ordered chair cushion.  Brought a tube of Critic-aid paste to room and discussed how to use on perineal area prn.            Intervention:     Patient's chart evaluated.      Assessed: heels    Orders  Written    Supplies  reviewed; ordered chair cushion    Discussed plan of care with Patient, Family and Nurse          All patient / family questions answered:  YES        Assessment:        Heels intact and blanchable, no pressure injury.  Feet with poor perfusion.            Plan:   Nursing to notify the Provider(s) and re-consult the [facility] Nurse if skin deteriorate(s).      Skin care plan to bilateral heels: Daily and prn:  Cleanse lower legs and feet with mild skin cleanser.   Then apply Sween 24 cream, avoiding between toes.  Elevate legs when possible; float heels at all times.        [facility] Nurse will return: will sign off; please re-consult if further issues

## 2019-07-05 NOTE — CONSULTS
[facility]    Cardiac Electrophysiology Consultation     Date of Admission:  7/3/2019  Date of Consult (When I saw the patient): 07/05/19    Assessment & Plan   Amira Arreola is a 86 year old female who was admitted on 7/3/2019. I was asked to see the patient for medical refractory AFib. Permanent AF a/w with recurrent pEF CHF required a few hosp recently. H/o metastatic multiple myeloma with low chance of meaningful recovery. Presented from card clinic with altered mental status along with CHF and hypotension. Improving now after correction of electrolytes abnl. AF with RVR R and has been quite difficult to control, however. Pt is a retired nurse anesthetist and her  a retired physician along with a daughter who is an ophthalmologist. Family conference with Dr. Goins was reviewed.    AF with RVR medical refractory with multiple recent hosp for CHF decompensation. Even though prognosis for multiple myeloma is poor I think it is quite reasonable to offer AVN ablation and ppm so that she won't have to rehosp for CHF for quality of life. Family had agreed to this option. Explained risks of procedure including but not limited to vascular injury and infection. We can stop both dilt and bb after procedure.    Lee Juan Pablo Menchaca    Code Status    DNR/DNI    Primary Care Physician   Addy Frias    History is obtained from the patient    Past Medical History   I have reviewed this patient's medical history and updated it with pertinent information if needed.   Past Medical History:   Diagnosis Date     Abnormal CXR 2018    then ct done and not significant     Acute diastolic heart failure (H) 03/22/2019    nl ef, 2+mr and tr with severe pulm htn     Ascending aorta dilatation (H) 04/2016    on echo, mild, fu 7/18 4.0, slightly larger     Cancer, metastatic to bone (H)     due to myeloma     Colonic polyp 2008    adenomatous, fu 2013 tics only     Compression fracture 2016    multiple areas of spine      Dry eyes      Elevated MCV     b12 and folic acid nl     HTN (hypertension)     off meds for years     Lung nodule 2018    on ct, 4mm, ct done for fu abnl cxr     Menorrhagia     hysteroscopy and d and c done     MGUS (monoclonal gammopathy of unknown significance)     eval by Dr. Roberts     Multiple myeloma (H)     dx  at [place], bone lesions seen on mri      OAB (overactive bladder)     Dr. Grullon     Osteoporosis     fu done  and stable, went off meds then, fu done ; has had gyn fu and added evista 2013 by gyn     Palpitations     nl echo, mildly dilated asc aorta     Paroxysmal atrial fibrillation (H)     had palp and ziopatch showed it, echo nl lv fxn, mild mr and tr, added coum and toprol, toprol dose raised 16     Pulmonary hypertension (H) 2019    seen on echo     Sciatica of left side     Dr. Helen Ricardo      SVT (supraventricular tachycardia) (H)     on ziopatch     Thrombocytopenia (H)        Past Surgical History   I have reviewed this patient's surgical history and updated it with pertinent information if needed.  Past Surgical History:   Procedure Laterality Date     BONE MARROW BIOPSY, BONE SPECIMEN, NEEDLE/TROCAR N/A 2016    Procedure: BIOPSY BONE MARROW;  Surgeon: Bryan Patel MD;  Location:  GI     CATARACT IOL, RT/LT        SECTION  , 1966     COLONOSCOPY  2013    Procedure: COLONOSCOPY;  COLONOSCOPY;  Surgeon: Steffany Rockwell MD;  Location:  GI     EXCISE EXOSTOSIS TIBIA / FIBULA  2014    Procedure: EXCISE EXOSTOSIS TIBIA / FIBULA;  Surgeon: Naila Pichardo MD;  Location:  SD     hysteroscopy and d and c      due to bleeding     left anle replacement       right ankle surgery         Prior to Admission Medications   Prior to Admission Medications   Prescriptions Last Dose Informant Patient Reported? Taking?   Carboxymethylcellulose Sod  PF (REFRESH PLUS) 0.5 % SOLN ophthalmic solution  Care Giver Yes Yes   Si drop 4 times daily as needed for dry eyes   Multiple Vitamins-Minerals (DAILY MULTIVITAMIN) CAPS 7/3/2019 at Unknown time Care Giver Yes Yes   Sig: Take 1 tablet by mouth daily    SIMBRINZA 1-0.2 % ophthalmic suspension 7/3/2019 at Unknown time Care Giver Yes Yes   Sig: Place 1 drop into the right eye 2 times daily 1 drop AM and PM   Sodium Fluoride (SF 5000 PLUS) 1.1 % CREA 7/3/2019 at Unknown time Care Giver Yes Yes   Sig: Apply to affected area 3 times daily   acetaminophen (TYLENOL) 500 MG tablet  Care Giver Yes Yes   Sig: Take 500 mg by mouth every 6 hours as needed for mild pain    acyclovir (ZOVIRAX) 400 MG tablet 7/3/2019 at Unknown time Care Giver No Yes   Sig: Take 1 tablet (400 mg) by mouth 2 times daily   dexamethasone (DECADRON) 4 MG tablet 7/3/2019 at am Care Giver No Yes   Sig: Take 20mg (5 tablets) by mouth every week.   diltiazem ER (DILT-XR) 180 MG 24 hr capsule 7/3/2019 at Unknown time  Yes Yes   Sig: Take 180 mg by mouth daily   diphenhydrAMINE-acetaminophen (TYLENOL PM)  MG tablet 2019 at Unknown time Care Giver Yes Yes   Sig: Take 2 tablets by mouth At Bedtime   furosemide (LASIX) 40 MG tablet 7/3/2019 at Unknown time Care Giver No Yes   Sig: Take 1 tablet (40 mg) by mouth daily   latanoprost (XALATAN) 0.005 % ophthalmic solution 2019 at Unknown time Care Giver Yes Yes   Sig: Place 1 drop into the right eye At Bedtime   lidocaine-prilocaine (EMLA) cream  Care Giver No Yes   Sig: Apply to port site 1 hour prior to access   metoprolol succinate ER (TOPROL-XL) 50 MG 24 hr tablet 7/3/2019 at Unknown time Care Giver No Yes   Sig: Take 3 tablets (150 mg) by mouth 2 times daily   oxyCODONE (ROXICODONE) 5 MG tablet  Care Giver No Yes   Sig: Take 1 tablet (5 mg) by mouth every 4 hours as needed for pain maximum 4 tablet(s) per day   polyethylene glycol (MIRALAX/GLYCOLAX) powder  Care Giver Yes Yes   Sig: Take 17  g by mouth daily as needed for constipation    potassium chloride (KLOR-CON) 20 MEQ packet 7/3/2019 at Unknown time Care Giver No Yes   Sig: Take 20 mEq by mouth 2 times daily   prochlorperazine (COMPAZINE) 10 MG tablet  Care Giver No Yes   Sig: Take 1 tablet (10 mg) by mouth every 6 hours as needed for nausea or vomiting   vitamin D3 (VITAMIN D3) 1000 units (25 mcg) tablet 7/3/2019 at Unknown time Care Giver Yes Yes   Sig: Take 1,000 Units by mouth daily   warfarin (COUMADIN) 4 MG tablet 7/2/2019 at Unknown time Care Giver Yes Yes   Sig: Take one tablet (4 mg) by mouth on Tuesdays and Saturdays; take one-half tablet ( 2 mg) on all other days of the week.      Facility-Administered Medications: None     Allergies   Allergies   Allergen Reactions     Blood Transfusion Related (Informational Only)      Blood antigens related to receiving Darzelex-AG     Penicillin [Penicillins] Rash     Blotches on chest        Social History   I have reviewed this patient's social history and updated it with pertinent information if needed. Amira Arreola  reports that she has never smoked. She has never used smokeless tobacco. She reports that she does not drink alcohol or use drugs.    Family History   I have reviewed this patient's family history and updated it with pertinent information if needed.   Family History   Problem Relation Age of Onset     Heart Disease Father      C.A.D. Mother      Cerebrovascular Disease Brother      Family History Negative Sister      Family History Negative Sister      Family History Negative Brother        Review of Systems   Comprehensive review of systems was performed with pertinent positives and negatives listed in assessment and plan section.    Physical Exam   Temp: 98.2  F (36.8  C) Temp src: Oral BP: 101/62 Pulse: 112 Heart Rate: 133 Resp: 20 SpO2: 96 % O2 Device: None (Room air)    Vital Signs with Ranges  Temp:  [97.5  F (36.4  C)-98.2  F (36.8  C)] 98.2  F (36.8  C)  Pulse:  [112]  112  Heart Rate:  [] 133  Resp:  [17-20] 20  BP: ()/(57-82) 101/62  SpO2:  [96 %-98 %] 96 %  128 lbs 1.6 oz    Constitutional: awake, alert, cooperative, no apparent distress, and appears stated age  Eyes: Lids and lashes normal, pupils equal, round extra ocular muscles intact, sclera clear, conjunctiva normal  ENT: Normocephalic, without obvious abnormality, atraumatic, sinuses nontender on palpation, external ears without lesions, oral pharynx with moist mucous membranes, tonsils without erythema or exudates, gums normal and good dentition.  Hematologic / Lymphatic: no supraclavicular lymphadenopathy  Respiratory: No increased work of breathing, good air exchange, clear to auscultation bilaterally, no crackles or wheezing  Cardiovascular: irregularly irregular rhythm  GI: no ascites  Skin: no bruising or bleeding  Musculoskeletal: There is no redness, warmth, or swelling of the joints.  Full range of motion noted.  Neurologic: Awake, alert,is normal.  Neuropsychiatric: General: normal, calm and normal eye contact    Data   I personally reviewed all recent ECGs and images.  Results for orders placed or performed during the hospital encounter of 07/03/19 (from the past 24 hour(s))   Basic metabolic panel   Result Value Ref Range    Sodium 142 133 - 144 mmol/L    Potassium 3.5 3.4 - 5.3 mmol/L    Chloride 108 94 - 109 mmol/L    Carbon Dioxide 24 20 - 32 mmol/L    Anion Gap 10 3 - 14 mmol/L    Glucose 235 (H) 70 - 99 mg/dL    Urea Nitrogen 35 (H) 7 - 30 mg/dL    Creatinine 0.79 0.52 - 1.04 mg/dL    GFR Estimate 68 >60 mL/min/[1.73_m2]    GFR Estimate If Black 78 >60 mL/min/[1.73_m2]    Calcium 8.7 8.5 - 10.1 mg/dL   Calcium ionized   Result Value Ref Range    Calcium Ionized 4.8 4.4 - 5.2 mg/dL   INR   Result Value Ref Range    INR 1.79 (H) 0.86 - 1.14   INR   Result Value Ref Range    INR 1.29 (H) 0.86 - 1.14   Basic metabolic panel   Result Value Ref Range    Sodium 141 133 - 144 mmol/L    Potassium 3.9  3.4 - 5.3 mmol/L    Chloride 108 94 - 109 mmol/L    Carbon Dioxide 28 20 - 32 mmol/L    Anion Gap 5 3 - 14 mmol/L    Glucose 147 (H) 70 - 99 mg/dL    Urea Nitrogen 34 (H) 7 - 30 mg/dL    Creatinine 0.58 0.52 - 1.04 mg/dL    GFR Estimate 83 >60 mL/min/[1.73_m2]    GFR Estimate If Black >90 >60 mL/min/[1.73_m2]    Calcium 8.4 (L) 8.5 - 10.1 mg/dL   CBC with platelets   Result Value Ref Range    WBC 11.9 (H) 4.0 - 11.0 10e9/L    RBC Count 3.37 (L) 3.8 - 5.2 10e12/L    Hemoglobin 10.8 (L) 11.7 - 15.7 g/dL    Hematocrit 32.3 (L) 35.0 - 47.0 %    MCV 96 78 - 100 fl    MCH 32.0 26.5 - 33.0 pg    MCHC 33.4 31.5 - 36.5 g/dL    RDW 18.2 (H) 10.0 - 15.0 %    Platelet Count 212 150 - 450 10e9/L   Calcium ionized whole blood (Add on or recollect)   Result Value Ref Range    Calcium Ionized Whole Blood 4.9 4.4 - 5.2 mg/dL   Lactate Dehydrogenase   Result Value Ref Range    Lactate Dehydrogenase 214 81 - 234 U/L

## 2019-07-05 NOTE — PROGRESS NOTES
[facility]    Medicine Progress Note - Hospitalist Service       Date of Admission:  7/3/2019  Assessment & Plan   Amira Arreola is a 86 year old female admitted on 7/3/2019.  Presents from cardiology clinic where noted to have hypotension and AMS.  Complicated history of chronic atrial fibrillation anticoagulated on warfarin, hypertension, chronic diastolic heart failure, pulmonary hypertension, SVT, metastatic multiple myeloma.  Admitted to [facility] from 6/28- 7/1/2019 for atrial fibrillation with RVR, acute on chronic diastolic heart failure, bilateral pleural effusions (no thora completed).     Metabolic encephalopathy, resolved  Suspect due to combination hypovolemia and hypercalcemia (corrected calcium level 11).  Afebrile without leukocytosis, UA unremarkable, CXR unchanged from prior.  Received 500 ml NS 7/3 with marked improvement in mentation 7/4.  - remains at baseline mental status per family  - blood cultures ngtd    Hypercalcemia  Corrected calcium level of 11 at admission, normalized with IVF.  - as hypercalcemia was mild and corrected quickly; will hold on bisphosphonate therapy, but will need monitoring outpatient  - discontinue PTA vitamin D    Chronic atrial fibrillation with RVR  HTN  Chronic diastolic heart failure  Severe pulmonary HTN  Hx of SVT  [PTA diltiazem, metoprolol succinate, Lasix, Coumadin]  Two recent hospitalizations for A-fib with RVR and acute on chronic diastolic CHF.  Presented to cardiology follow up appt 7/3 and found to be hypotensive to 60's systolic, likely due to combination of CCB and BB.  In A-fib with RVR at arrival, serial trop negative.  Most recent TTE 3/2019 shows LVEF 55-60% with moderate MR and TR, severe pulmonary hypertension.  - initiate dilt gtt this AM as BP ok; discontinue after ablation  - discussed with Dr. Menchaca of EP, recommends ablation and PPM today  - PTA lasix held due to hypovolemia  - INR reversed with Vit K 7/4 for thoracentesis,  plan to resume coumadin this evening     Left pleural effusion  Admission CXR with left pleural effusion, largely unchanged from prior.  No hypoxia and VBG with pO2, low pCO2.  Unclear if malignant vs possible cardiac.  - thoracentesis today with 250cc out; labs pending  - new leukocytosis 7/5, remains afebrile without signs of pneumonia, will check procal and otherwise monitor  - encourage IS post-thora     Goals of Care  Metastatic Multiple myeloma   Deconditioning  Follows with  of oncology, chemo is on hold due to her continued physical decline.  Per family, patient has been showing signs of an underlying dementia, she becomes more confused in the evening hours, and is unable to care for herself at home.  See progress note 7/4 regarding goals of care discussion.  - palliative care assistance regarding goals of care much appreciated  - family continues to discuss long term goals of care but currently wishing to pursue restorative therapies  - discussed with Dr. Lanier of oncology who will be by today to discuss current cancer status and prognosis, any alternative treatment options  - PT/OT consult, family interested in TCU - discussed with social work as well    Severe malnutrition  Has experienced >5% weight loss in 1 month, has subcutaneous fat and muscle loss on exam.  - nutrition following         Diet: 2 Gram Sodium Diet  Room Service  NPO for Medical/Clinical Reasons Except for: Other; Specify: May take medications with a drink of water prior to Electrophysiology study  Snacks/Supplements Adult: Other; Vanilla milkshake + 2pkts benepro; Between Meals    DVT Prophylaxis: Pneumatic Compression Devices  Russo Catheter: not present  Code Status: DNR/DNI      Disposition Plan   Expected discharge: 1-2 days, recommended to transitional care unit once pacemaker placement complete, cardiac meds optimized, any further oncology work-up complete.  Entered: Addy Goins MD 07/05/2019, 12:11 PM       The  patient's care was discussed with the Bedside Nurse, Patient and Patient's Family.    Addy Goins MD  Hospitalist Service  [facility]    ______________________________________________________________________    Interval History   She reports feeling more clear today in terms of mental status. Tolerated thoracentesis well without chest pain/pressure, denies any current chest discomfort.  No fever/chills or other complaints.    Data reviewed today: I reviewed all medications, new labs and imaging results over the last 24 hours. I personally reviewed no images or EKG's today.    Physical Exam   Vital Signs: Temp: 98.2  F (36.8  C) Temp src: Oral BP: 108/68 Pulse: 112 Heart Rate: 112 Resp: 20 SpO2: 96 % O2 Device: None (Room air)    Weight: 128 lbs 1.6 oz  General Appearance: Well developed, well nourished elderly female in NAD  Respiratory: left basilar crackles, no wheezing or tachypnea  Cardiovascular: irregular rhythm, normal rate, normal s1/s2  GI: normal bowel sounds  Lymph:  No peripheral edema  Other: Alert and appropriate, cranial nerves grossly intact     Data   Recent Labs   Lab 07/05/19  0400 07/04/19  2030 07/04/19  1130 07/03/19  2045 07/03/19  1932 07/03/19  1319 07/03/19  0925 07/03/19  0923  06/28/19  2114   WBC 11.9*  --   --   --   --   --  9.4  --   --  7.2   HGB 10.8*  --   --   --   --   --  13.2  --   --  12.9   MCV 96  --   --   --   --   --  96  --   --  96     --   --   --   --   --  208  --   --  199   INR 1.29* 1.79*  --   --   --   --   --  2.17*   < > 3.89*     --  142  --   --   --  139  --    < > 138   POTASSIUM 3.9  --  3.5  --   --   --  4.7  --    < > 3.6   CHLORIDE 108  --  108  --   --   --  105  --    < > 104   CO2 28  --  24  --   --   --  27  --    < > 28   BUN 34*  --  35*  --   --   --  19  --    < > 22   CR 0.58  --  0.79  --   --   --  0.70  --    < > 0.70   ANIONGAP 5  --  10  --   --   --  7  --    < > 6   BRADLEY 8.4*  --  8.7  --   --   --  9.7   --    < > 9.0   *  --  235*  --   --   --  126*  --    < > 140*   ALBUMIN  --   --   --  2.4*  --   --   --   --   --  3.1*   PROTTOTAL  --   --   --  5.2*  --   --   --   --   --  6.2*   BILITOTAL  --   --   --  0.6  --   --   --   --   --  0.5   ALKPHOS  --   --   --  52  --   --   --   --   --  62   ALT  --   --   --  32  --   --   --   --   --  79*   AST  --   --   --  22  --   --   --   --   --  48*   TROPI  --   --   --   --  <0.015 <0.015 <0.015  --    < > <0.015    < > = values in this interval not displayed.

## 2019-07-05 NOTE — PROGRESS NOTES
Uneventful AVN modification resulting in CHB with junctional escape rate of 40 bpm and implantation of a single chamber ppm. Both ccb and bb can be tapered off.

## 2019-07-05 NOTE — PLAN OF CARE
Pt A&O, forgetful at times. Tele A-fib -140's overnight, received PRN metop. Pt denies CP, dizziness, and SOB. Q2H turn. INR drawn @ 0400 for thoracentesis today. Purewic changed this AM. Will continue to monitor.

## 2019-07-05 NOTE — PHARMACY-ANTICOAGULATION SERVICE
Clinical Pharmacy - Warfarin Dosing Consult     Pharmacy has been consulted to manage this patient s warfarin therapy.  Indication: Atrial Fibrillation  Therapy Goal: INR 2-3  Warfarin Prior to Admission: Yes  Warfarin PTA Regimen: 4mg twice a week, 2mg other days  Significant drug interactions: pt has received vit K for INR reversal.  Recent documented change in oral intake/nutrition: Yes  Dose Comments: poor po intake    INR   Date Value Ref Range Status   07/05/2019 1.29 (H) 0.86 - 1.14 Final   07/04/2019 1.79 (H) 0.86 - 1.14 Final       Recommend warfarin 4 mg today.  Pharmacy will monitor Amira Arreola daily and order warfarin doses to achieve specified goal.      Please contact pharmacy as soon as possible if the warfarin needs to be held for a procedure or if the warfarin goals change.

## 2019-07-05 NOTE — PLAN OF CARE
PT/CR: Pt going off floor for thoracentesis this AM with ablation and PPM scheduled for this PM, will reschedule PT/CR.

## 2019-07-05 NOTE — PROGRESS NOTES
RADIOLOGY PROCEDURE NOTE  Patient name: Amira Arreola  MRN: [id number]  : 1932    Pre-procedure diagnosis: Left pleural effusion  Post-procedure diagnosis: Same    Procedure Date/Time: 2019  11:17 AM  Procedure: Thoracentesis  Estimated blood loss: None  Specimen(s) collected with description: Pleural fluid.  The patient tolerated the procedure well with no immediate complications.    See imaging dictation for procedural details and findings.    Provider name: Curtis Jj  Assistant(s):None

## 2019-07-06 ENCOUNTER — APPOINTMENT (OUTPATIENT)
Dept: PHYSICAL THERAPY | Facility: CLINIC | Age: 84
DRG: 242 | End: 2019-07-06
Attending: NURSE PRACTITIONER
Payer: MEDICARE

## 2019-07-06 ENCOUNTER — APPOINTMENT (OUTPATIENT)
Dept: GENERAL RADIOLOGY | Facility: CLINIC | Age: 84
DRG: 242 | End: 2019-07-06
Attending: INTERNAL MEDICINE
Payer: MEDICARE

## 2019-07-06 LAB
ANION GAP SERPL CALCULATED.3IONS-SCNC: 8 MMOL/L (ref 3–14)
BUN SERPL-MCNC: 25 MG/DL (ref 7–30)
CALCIUM SERPL-MCNC: 8.5 MG/DL (ref 8.5–10.1)
CHLORIDE SERPL-SCNC: 108 MMOL/L (ref 94–109)
CO2 SERPL-SCNC: 25 MMOL/L (ref 20–32)
CREAT SERPL-MCNC: 0.52 MG/DL (ref 0.52–1.04)
ERYTHROCYTE [DISTWIDTH] IN BLOOD BY AUTOMATED COUNT: 18.2 % (ref 10–15)
GFR SERPL CREATININE-BSD FRML MDRD: 86 ML/MIN/{1.73_M2}
GLUCOSE SERPL-MCNC: 91 MG/DL (ref 70–99)
HCT VFR BLD AUTO: 31.8 % (ref 35–47)
HGB BLD-MCNC: 10.5 G/DL (ref 11.7–15.7)
INR PPP: 1.36 (ref 0.86–1.14)
MCH RBC QN AUTO: 31.8 PG (ref 26.5–33)
MCHC RBC AUTO-ENTMCNC: 33 G/DL (ref 31.5–36.5)
MCV RBC AUTO: 96 FL (ref 78–100)
PLATELET # BLD AUTO: 183 10E9/L (ref 150–450)
POTASSIUM SERPL-SCNC: 3.9 MMOL/L (ref 3.4–5.3)
RBC # BLD AUTO: 3.3 10E12/L (ref 3.8–5.2)
SODIUM SERPL-SCNC: 141 MMOL/L (ref 133–144)
WBC # BLD AUTO: 7.9 10E9/L (ref 4–11)

## 2019-07-06 PROCEDURE — 40000986 XR CHEST 2 VW

## 2019-07-06 PROCEDURE — 85027 COMPLETE CBC AUTOMATED: CPT | Performed by: HOSPITALIST

## 2019-07-06 PROCEDURE — 93010 ELECTROCARDIOGRAM REPORT: CPT | Performed by: INTERNAL MEDICINE

## 2019-07-06 PROCEDURE — 97161 PT EVAL LOW COMPLEX 20 MIN: CPT | Mod: GP

## 2019-07-06 PROCEDURE — 21000001 ZZH R&B HEART CARE

## 2019-07-06 PROCEDURE — 85610 PROTHROMBIN TIME: CPT | Performed by: HOSPITALIST

## 2019-07-06 PROCEDURE — 25000132 ZZH RX MED GY IP 250 OP 250 PS 637: Mod: GY | Performed by: HOSPITALIST

## 2019-07-06 PROCEDURE — 99232 SBSQ HOSP IP/OBS MODERATE 35: CPT | Performed by: INTERNAL MEDICINE

## 2019-07-06 PROCEDURE — 25000128 H RX IP 250 OP 636: Performed by: HOSPITALIST

## 2019-07-06 PROCEDURE — 80048 BASIC METABOLIC PNL TOTAL CA: CPT | Performed by: HOSPITALIST

## 2019-07-06 PROCEDURE — 93005 ELECTROCARDIOGRAM TRACING: CPT

## 2019-07-06 PROCEDURE — 97110 THERAPEUTIC EXERCISES: CPT | Mod: GP

## 2019-07-06 PROCEDURE — 97530 THERAPEUTIC ACTIVITIES: CPT | Mod: GP

## 2019-07-06 PROCEDURE — 83883 ASSAY NEPHELOMETRY NOT SPEC: CPT | Performed by: HOSPITALIST

## 2019-07-06 PROCEDURE — 25000132 ZZH RX MED GY IP 250 OP 250 PS 637: Mod: GY | Performed by: NURSE PRACTITIONER

## 2019-07-06 PROCEDURE — 99232 SBSQ HOSP IP/OBS MODERATE 35: CPT | Performed by: HOSPITALIST

## 2019-07-06 RX ORDER — WARFARIN SODIUM 2 MG/1
2 TABLET ORAL
Status: COMPLETED | OUTPATIENT
Start: 2019-07-06 | End: 2019-07-06

## 2019-07-06 RX ADMIN — SODIUM CHLORIDE, PRESERVATIVE FREE 5 ML: 5 INJECTION INTRAVENOUS at 08:21

## 2019-07-06 RX ADMIN — ACYCLOVIR 400 MG: 400 TABLET ORAL at 08:21

## 2019-07-06 RX ADMIN — WARFARIN SODIUM 2 MG: 2 TABLET ORAL at 17:40

## 2019-07-06 RX ADMIN — ACYCLOVIR 400 MG: 400 TABLET ORAL at 15:36

## 2019-07-06 RX ADMIN — BRINZOLAMIDE/BRIMONIDINE TARTRATE 1 DROP: 10; 2 SUSPENSION/ DROPS OPHTHALMIC at 09:48

## 2019-07-06 RX ADMIN — ASPIRIN 325 MG ORAL TABLET 325 MG: 325 PILL ORAL at 08:21

## 2019-07-06 RX ADMIN — BRINZOLAMIDE/BRIMONIDINE TARTRATE 1 DROP: 10; 2 SUSPENSION/ DROPS OPHTHALMIC at 21:00

## 2019-07-06 RX ADMIN — Medication 5 ML: at 05:45

## 2019-07-06 RX ADMIN — LATANOPROST 1 DROP: 50 SOLUTION/ DROPS OPHTHALMIC at 21:07

## 2019-07-06 ASSESSMENT — MIFFLIN-ST. JEOR: SCORE: 1026.02

## 2019-07-06 ASSESSMENT — ACTIVITIES OF DAILY LIVING (ADL)
ADLS_ACUITY_SCORE: 30
ADLS_ACUITY_SCORE: 32

## 2019-07-06 NOTE — PROGRESS NOTES
PAS-RR    D: Per [employer] regulation, SW completed and submitted PAS-RR to [place] [employer] Direct Connect via the Senior LinkAge Line.  PAS-RR confirmation # is : [id number]      I: SW spoke with pt and they are aware a PAS-RR has been submitted.  SW reviewed with pt that they may be contacted for a follow up appointment within 10 days of hospital discharge if their SNF stay is < 30 days.  Contact information for Senior LinkAge Line was also provided.    A: pt verbalized understanding.    P: Further questions may be directed to [facility] LinkAge Line at #[phone], option #4 for PAS-RR staff.

## 2019-07-06 NOTE — PLAN OF CARE
Discharge Planner OT   Patient plan for discharge: rehab  Current status: OT orders received and chart reviewed. Per PT, plan already in place to discharge to TCU in 1-2 days. Due to expected short length of stay and plan already in place, will defer OT eval to next level of care at TCU and complete OT order.  Barriers to return to prior living situation: defer to PT  Recommendations for discharge: defer to PT  Rationale for recommendations: defer to PT; OT order completed       Entered by: Glenis Mack 07/06/2019 12:14 PM

## 2019-07-06 NOTE — PROGRESS NOTES
[facility]  Medicine Progress Note - Hospitalist Service       Date of Admission:  7/3/2019  Assessment & Plan   Amira Arreola is a 86 year old female admitted on 7/3/2019.  Presents from cardiology clinic where noted to have hypotension and AMS.  Complicated history of chronic atrial fibrillation anticoagulated on warfarin, hypertension, chronic diastolic heart failure, pulmonary hypertension, SVT, metastatic multiple myeloma.  Admitted to [facility] from 6/28- 7/1/2019 for atrial fibrillation with RVR, acute on chronic diastolic heart failure, bilateral pleural effusions (no thora completed).     Metabolic encephalopathy, resolved  Suspect due to combination hypovolemia and hypercalcemia (corrected calcium level 11).  Afebrile without leukocytosis, UA unremarkable, CXR unchanged from prior.  Received 500 ml NS 7/3 with marked improvement in mentation 7/4.  - remains at baseline mental status per family  - blood cultures negative so far.    Hypercalcemia: likely related to dehydration and Myeloma  Corrected calcium level of 11 at admission, normalized with IVF.  - as hypercalcemia was mild and corrected quickly; holding bisphosphonate therapy, but will need monitoring outpatient  - discontinued PTA vitamin D    Chronic atrial fibrillation with RVR  HTN  Chronic diastolic heart failure  Severe pulmonary HTN  Hx of SVT  [PTA diltiazem, metoprolol succinate, Lasix, Coumadin]  Two recent hospitalizations for A-fib with RVR and acute on chronic diastolic CHF.  Presented to cardiology follow up appt 7/3 and found to be hypotensive to 60's systolic, likely due to combination of CCB and BB.  In A-fib with RVR at arrival, serial trop negative.  Most recent TTE 3/2019 shows LVEF 55-60% with moderate MR and TR, severe pulmonary hypertension.  - initiate dilt gtt which was discontinued after ablation  - s/p AVN ablation and PPM 7/5  - PTA lasix held due to hypovolemia. Appears euvolemic, weight trended up but now  at her discharge weight from recent hospitalization. Possibly resume lower dose lasix tomorrow.   - INR reversed with Vit K 7/4 for thoracentesis then resumed.     Left pleural effusion  Admission CXR with left pleural effusion, largely unchanged from prior.  No hypoxia and VBG with pO2, low pCO2. Possible cardiac.  - thoracentesis 7/5 with 250cc out; analysis shows transudative in nature, possibly related to CHF  - new leukocytosis 7/5, remains afebrile without signs of pneumonia,  procal negative and WBC normal now  - encourage IS       Goals of Care  Metastatic Multiple myeloma   Deconditioning  Follows with  of oncology, chemo is on hold due to her continued physical decline. Per family, patient has been showing signs of an underlying dementia, she becomes more confused in the evening hours, and is unable to care for herself at home. See progress note 7/4 regarding goals of care discussion.  - palliative care assistance regarding goals of care much appreciated  - family continues to discuss long term goals of care but currently wishing to pursue restorative therapies  - family plan to follow up with Dr Roberts after discharge.  - PT/OT consult, family interested in TCU - discussed with social work as well, possibly to [facility].    Severe malnutrition  Has experienced >5% weight loss in 1 month, has subcutaneous fat and muscle loss on exam.  - nutrition following    Diet: Room Service  Snacks/Supplements Adult: Other; Vanilla milkshake + 2pkts benepro; Between Meals  2 Gram Sodium Diet    DVT Prophylaxis: Pneumatic Compression Devices  Russo Catheter: not present  Code Status: DNR/DNI      Disposition Plan   Expected discharge: 1 day, recommended to transitional care unit if weight and resp status and mentation stable for next 24 hours.     Entered: Mauricio Jones MD 07/06/2019, 12:53 PM    The patient's care was discussed with the Bedside Nurse, Patient and Patient's Family.    Mauricio Jones,  MD  Hospitalist Service  [facility]    ______________________________________________________________________    Interval History   She reports feeling to herself today, family at bedside report she is at her usual status in terms of mentation. Denies dyspnea. No chest pain.   - No fever/chills or other complaints.  - no nausea, abd pain, diarrhea.     Data reviewed today: I reviewed all medications, new labs and imaging results over the last 24 hours. I personally reviewed telemetry shows IVCD    Physical Exam   Vital Signs: Temp: 97.7  F (36.5  C) Temp src: Oral BP: 126/87 Pulse: 81 Heart Rate: 84 Resp: 16 SpO2: 93 % O2 Device: None (Room air) Oxygen Delivery: 2 LPM  Weight: 129 lbs 0 oz     General Appearance: Well developed, well nourished elderly female in NAD  Respiratory: Diminished Lt base with basilar crackles, no wheezing or tachypnea.  Cardiovascular:  S1/s2 regular. No tachycardia. No murmur.   GI: normal bowel sounds  Lymph:  trace peripheral edema  Other: Alert and appropriate, cranial nerves grossly intact     Data   Recent Labs   Lab 07/06/19  0556 07/05/19  1259 07/05/19  0400 07/04/19  2030 07/04/19  1130 07/03/19  2045 07/03/19  1932 07/03/19  1319 07/03/19  0925   WBC 7.9  --  11.9*  --   --   --   --   --  9.4   HGB 10.5*  --  10.8*  --   --   --   --   --  13.2   MCV 96  --  96  --   --   --   --   --  96     --  212  --   --   --   --   --  208   INR 1.36*  --  1.29* 1.79*  --   --   --   --   --      --  141  --  142  --   --   --  139   POTASSIUM 3.9  --  3.9  --  3.5  --   --   --  4.7   CHLORIDE 108  --  108  --  108  --   --   --  105   CO2 25  --  28  --  24  --   --   --  27   BUN 25  --  34*  --  35*  --   --   --  19   CR 0.52  --  0.58  --  0.79  --   --   --  0.70   ANIONGAP 8  --  5  --  10  --   --   --  7   BRADLEY 8.5  --  8.4*  --  8.7  --   --   --  9.7   GLC 91  --  147*  --  235*  --   --   --  126*   ALBUMIN  --   --   --   --   --  2.4*  --   --    --    PROTTOTAL  --  5.7*  --   --   --  5.2*  --   --   --    BILITOTAL  --   --   --   --   --  0.6  --   --   --    ALKPHOS  --   --   --   --   --  52  --   --   --    ALT  --   --   --   --   --  32  --   --   --    AST  --   --   --   --   --  22  --   --   --    TROPI  --   --   --   --   --   --  <0.015 <0.015 <0.015

## 2019-07-06 NOTE — PLAN OF CARE
Discharge Planner PT   Patient plan for discharge: Pt states she wants to go home with assist, pt's family plans for TCU  Current status: Pt is 86 year old female adm on 7/3/19 with AMS and hypotension, underwent thoracentesis, AVN ablation, and PPM on 7/5/19. At baseline, pt lives with family in one level home with 3 stairs to enter, ambulates with rolling walker. PT evaluation completed and treatment initiated. Pt is min assist of 2 for bed mobility, min assist for functional transfers, declined ambulation at this time.  Barriers to return to prior living situation: Decreased strength, decreased balance, decreased activity tolerance  Recommendations for discharge: TCU  Rationale for recommendations: Pt is below baseline level of function, at increased risk for falls, recommend continued PT at TCU to increase strength, activity tolerance, and independence with mobility prior to return home.       Entered by: Lee Ann Elaine 07/06/2019 2:27 PM

## 2019-07-06 NOTE — PROGRESS NOTES
07/06/19 0920   Quick Adds   Type of Visit Initial PT Evaluation   Living Environment   Lives With spouse;child(ezra), adult   Living Arrangements house   Home Accessibility stairs to enter home   Number of Stairs, Main Entrance 3   Stair Railings, Main Entrance railings safe and in good condition   Transportation Anticipated family or friend will provide   Living Environment Comment Pt reports she lives with her son and spouse, main floor set up with 3 steps to enter, laundry in basement but pt does not do laundry.   Self-Care   Usual Activity Tolerance moderate   Current Activity Tolerance fair   Regular Exercise No   Equipment Currently Used at Home walker, rolling   Activity/Exercise/Self-Care Comment Pt reports she is independent with ADLs, has been using walker lately   Functional Level Prior   Ambulation 1-->assistive equipment   Transferring 1-->assistive equipment   Fall history within last six months yes   Number of times patient has fallen within last six months 2   Prior Functional Level Comment Pt has HHA to assist with bathing, laundry, and cleaning   General Information   Onset of Illness/Injury or Date of Surgery - Date 07/03/19   Referring Physician Addy Goins MD   Patient/Family Goals Statement To go home   Pertinent History of Current Problem (include personal factors and/or comorbidities that impact the POC) Pt is 86 year old female adm on 7/3/19 from cardiology clinic with AMS and hypotension. Pt was recently hospitalized 6/28-7/1/19 with a-fib with RVR. PMH includes a-fib, HTN, chronic diastolic heart failure, pulm HTN, SVT, metastatic multiple myeloma. Pt underwent thoracentesis, AVN ablation, and PPM on 7/5/19.   Precautions/Limitations fall precautions  (bone mets)   Cognitive Status Examination   Orientation person;place   Level of Consciousness alert   Cognitive Comment Pt pleasant and cooperative, following commands appropriately.   Pain Assessment   Patient Currently in Pain No  "  Posture    Posture Not impaired   Range of Motion (ROM)   ROM Comment B LE ROM WFL   Strength   Strength Comments B LE strength 3/5   Bed Mobility   Bed Mobility Comments Min assist of 2   Transfer Skills   Transfer Comments Min assist of 1   Gait   Gait Comments Pt declined ambulation at this time, wanting to eat breakfast   Balance   Balance Comments Fair, needing UE support in standing   Sensory Examination   Sensory Perception Comments Denies numbness/tingling   General Therapy Interventions   Planned Therapy Interventions bed mobility training;balance training;gait training;strengthening;transfer training;home program guidelines;progressive activity/exercise   Clinical Impression   Criteria for Skilled Therapeutic Intervention yes, treatment indicated   PT Diagnosis Impaired functional mobility   Influenced by the following impairments Decreased strength, decreased balance, decreased activity tolerance   Functional limitations due to impairments Decreased independence with functional mobility tasks, increased fall risk   Clinical Presentation Stable/Uncomplicated   Clinical Presentation Rationale Current presentation, [facility]   Clinical Decision Making (Complexity) Low complexity   Therapy Frequency 5x/week   Predicted Duration of Therapy Intervention (days/wks) 5 days   Anticipated Discharge Disposition Transitional Care Facility   Risk & Benefits of therapy have been explained Yes   Patient, Family & other staff in agreement with plan of care Yes   [facility]  \"6 Clicks\" V.2 Basic Mobility Inpatient Short Form   1. Turning from your back to your side while in a flat bed without using bedrails? 3 - A Little   2. Moving from lying on your back to sitting on the side of a flat bed without using bedrails? 3 - A Little   3. Moving to and from a bed to a chair (including a wheelchair)? 3 - A Little   4. Standing up from a chair using your arms (e.g., wheelchair, or bedside chair)? 3 - A Little   5. To " walk in hospital room? 3 - A Little   6. Climbing 3-5 steps with a railing? 2 - A Lot   Basic Mobility Raw Score (Score out of 24.Lower scores equate to lower levels of function) 17   Total Evaluation Time   Total Evaluation Time (Minutes) 10

## 2019-07-06 NOTE — PROGRESS NOTES
[facility]    Cardiology Progress Note     Assessment & Plan   Amira Arreola is a 86 year old female who was admitted on 7/3/2019.  She is a pleasant 86-year-old female with multiple myeloma on therapy, heart failure with preserved ejection fraction, atrial fibrillation for unable to tolerate therapy for rate control due to symptomatic hypotension now status post AV solomon ablation and pacemaker implantation on 7/5/2019.     1.  Atrial fibrillation now status post AVJ ablation and pacemaker implantation  Currently doing well after pacemaker implantation.  The device site is healing well without evidence of significant hematoma   EKG demonstrates sinus rhythm with back-up pacing  On anticoagulation with warfarin    2.  Heart failure with preserved ejection fraction  Currently euvolemic and off diuretics    3.  Hypotension   resolved  currently off all beta-blockers and calcium channel blockers    4.  Moderate mitral and tricuspid regurgitation  Currently euvolemic  Will monitor for now    5.  Multiple myeloma  On chemotherapy, oncology following.  There was a discussion in the past about hospice care.  Low chances of meaningful recovery    6.  Confusion  Resolved   most likely the setting of electrolyte abnormalities at the time of presentation    Kimo Aaron MD  Text Page (7am - 5pm, M-F)    Interval History   Doing well since ablation with pacemaker.  Alert and oriented x3 today.  Confusion has resolved. Reports mild discomfort from the thoracentesis site on left chest    Physical Exam   Temp: 97.7  F (36.5  C) Temp src: Oral BP: 126/87 Pulse: 81 Heart Rate: 84 Resp: 16 SpO2: 93 % O2 Device: None (Room air) Oxygen Delivery: 2 LPM  Vitals:    07/04/19 0500 07/05/19 0530 07/06/19 0615   Weight: 57.1 kg (125 lb 14.1 oz) 58.1 kg (128 lb 1.6 oz) 58.5 kg (129 lb)     Vital Signs with Ranges  Temp:  [97.7  F (36.5  C)-98.1  F (36.7  C)] 97.7  F (36.5  C)  Pulse:  [81-82] 81  Heart Rate:  []  84  Resp:  [15-22] 16  BP: (101-127)/(59-87) 126/87  SpO2:  [93 %-99 %] 93 %  I/O last 3 completed shifts:  In: 1020 [P.O.:1020]  Out: 350 [Urine:100; Drains:250]  Patient Active Problem List   Diagnosis     Benign essential hypertension     Colonic polyp     Hyperlipidemia LDL goal <160     Sciatica of left side     Osteoporosis     OAB (overactive bladder)     Elevated MCV     Thrombocytopenia (H)     MGUS (monoclonal gammopathy of unknown significance)     Ascending aorta dilatation (H)     SVT (supraventricular tachycardia) (H)     Paroxysmal atrial fibrillation (H)     Long term current use of anticoagulant therapy     Cancer, metastatic to bone (H)     Multiple myeloma not having achieved remission (H)     Portacath in place     Lung nodule     Closed compression fracture of thoracic vertebra (H)     Chronic diastolic heart failure (H)     Physical deconditioning     Acute diastolic heart failure (H)     Pulmonary hypertension (H)     Atrial fibrillation with rapid ventricular response (H)     ACS (acute coronary syndrome) (H)       Constitutional: awake, alert, no distress  Skin: Warm and dry to touch. PPM site without significant hematoma, mild ecchymosis, only mild tenderness to touch  Head: Normocephalic, atraumatic  Eyes: Conjunctivae and lids unremarkable, sclera white  ENT: No pallor or cyanosis  Respiratory: Normal breath sounds, clear to auscultation  Cardiac: Regular rate and rhythm, S1-S2 normal.  2/6 systolic murmur.   Extremities and musculoskeletal: No gross motor deficit  Neurological.  Affect normal  Psych: Alert and oriented x3      Medications     - MEDICATION INSTRUCTIONS -       - MEDICATION INSTRUCTIONS -       Reason anticoagulation order not selected       - MEDICATION INSTRUCTIONS -       - MEDICATION INSTRUCTIONS -       ACE/ARB/ARNI NOT PRESCRIBED       Warfarin Therapy Reminder         acyclovir  400 mg Oral BID     aspirin  325 mg Oral Daily     brinzolamide-brimonidine  1 drop  Right Eye BID     heparin  5 mL Intracatheter Q28 Days     heparin lock flush  5-10 mL Intracatheter Q24H     latanoprost  1 drop Right Eye At Bedtime     sodium chloride (PF)  3 mL Intracatheter Q8H     warfarin  2 mg Oral ONCE at 18:00       Data   Results for orders placed or performed during the hospital encounter of 07/03/19 (from the past 24 hour(s))   Electrophysiology IP Consult: Patient to be seen: Routine - within 24 hours; AF; Consultant may enter orders: Yes; Requesting provider? Attending physician    Lee Souza MD     7/5/2019 10:27 AM  [facility]    Cardiac Electrophysiology Consultation     Date of Admission:  7/3/2019  Date of Consult (When I saw the patient): 07/05/19    Assessment & Plan   Amira Arreola is a 86 year old female who was admitted on   7/3/2019. I was asked to see the patient for medical refractory   AFib. Permanent AF a/w with recurrent pEF CHF required a few hosp   recently. H/o metastatic multiple myeloma with low chance of   meaningful recovery. Presented from card clinic with altered   mental status along with CHF and hypotension. Improving now after   correction of electrolytes abnl. AF with RVR R and has been quite   difficult to control, however. Pt is a retired nurse anesthetist   and her  a retired physician along with a daughter who is   an ophthalmologist. Family conference with Dr. Goins was   reviewed.    AF with RVR medical refractory with multiple recent hosp for CHF   decompensation. Even though prognosis for multiple myeloma is   poor I think it is quite reasonable to offer AVN ablation and ppm   so that she won't have to rehosp for CHF for quality of life.   Family had agreed to this option. Explained risks of procedure   including but not limited to vascular injury and infection. We   can stop both dilt and bb after procedure.    Lee Camargo    Code Status    DNR/DNI    Primary Care Physician   Addy  [facility]    History is obtained from the patient    Past Medical History   I have reviewed this patient's medical history and updated it   with pertinent information if needed.   Past Medical History:   Diagnosis Date     Abnormal CXR 2018    then ct done and not significant     Acute diastolic heart failure (H) 03/22/2019    nl ef, 2+mr and tr with severe pulm htn     Ascending aorta dilatation (H) 04/2016    on echo, mild, fu 7/18 4.0, slightly larger     Cancer, metastatic to bone (H)     due to myeloma     Colonic polyp 2008    adenomatous, fu 2013 tics only     Compression fracture 2016    multiple areas of spine     Dry eyes      Elevated MCV 2015    b12 and folic acid nl     HTN (hypertension) 2000    off meds for years     Lung nodule 08/2018    on ct, 4mm, ct done for fu abnl cxr     Menorrhagia 2002    hysteroscopy and d and c done     MGUS (monoclonal gammopathy of unknown significance) 2015    eval by Dr. Roberts     Multiple myeloma (H) 2016    dx 5/16 at [place], bone lesions seen on mri 6/16     OAB (overactive bladder) 2013    Dr. Grullon     Osteoporosis     fu done 2010 and stable, went off meds then, fu done 2013; has   had gyn fu and added evista 2013 by gyn     Palpitations 4/16    nl echo, mildly dilated asc aorta     Paroxysmal atrial fibrillation (H) 4/16    had palp and ziopatch showed it, echo nl lv fxn, mild mr and tr,   added coum and toprol, toprol dose raised 12/22/16     Pulmonary hypertension (H) 03/2019    seen on echo     Sciatica of left side 12/13    Dr. Helen Ricardo 2004     SVT (supraventricular tachycardia) (H) 4/16    on ziopatch     Thrombocytopenia (H) 2014       Past Surgical History   I have reviewed this patient's surgical history and updated it   with pertinent information if needed.  Past Surgical History:   Procedure Laterality Date     BONE MARROW BIOPSY, BONE SPECIMEN, NEEDLE/TROCAR N/A 5/25/2016    Procedure: BIOPSY BONE MARROW;  Surgeon: Bryan Patel    MD Gallito;  Location:  GI     CATARACT IOL, RT/LT        SECTION  1965, 1966     COLONOSCOPY  2013    Procedure: COLONOSCOPY;  COLONOSCOPY;  Surgeon: Steffany Rockwell MD;  Location:  GI     EXCISE EXOSTOSIS TIBIA / FIBULA  2014    Procedure: EXCISE EXOSTOSIS TIBIA / FIBULA;  Surgeon: Naila Pichardo MD;  Location:  SD     hysteroscopy and d and c      due to bleeding     left anle replacement       right ankle surgery         Prior to Admission Medications   Prior to Admission Medications   Prescriptions Last Dose Informant Patient Reported? Taking?   Carboxymethylcellulose Sod PF (REFRESH PLUS) 0.5 % SOLN   ophthalmic solution  Care Giver Yes Yes   Si drop 4 times daily as needed for dry eyes   Multiple Vitamins-Minerals (DAILY MULTIVITAMIN) CAPS 7/3/2019 at   Unknown time Care Giver Yes Yes   Sig: Take 1 tablet by mouth daily    SIMBRINZA 1-0.2 % ophthalmic suspension 7/3/2019 at Unknown time   Care Giver Yes Yes   Sig: Place 1 drop into the right eye 2 times daily 1 drop AM and   PM   Sodium Fluoride (SF 5000 PLUS) 1.1 % CREA 7/3/2019 at Unknown   time Care Giver Yes Yes   Sig: Apply to affected area 3 times daily   acetaminophen (TYLENOL) 500 MG tablet  Care Giver Yes Yes   Sig: Take 500 mg by mouth every 6 hours as needed for mild pain    acyclovir (ZOVIRAX) 400 MG tablet 7/3/2019 at Unknown time Care   Giver No Yes   Sig: Take 1 tablet (400 mg) by mouth 2 times daily   dexamethasone (DECADRON) 4 MG tablet 7/3/2019 at am Care Giver No   Yes   Sig: Take 20mg (5 tablets) by mouth every week.   diltiazem ER (DILT-XR) 180 MG 24 hr capsule 7/3/2019 at Unknown   time  Yes Yes   Sig: Take 180 mg by mouth daily   diphenhydrAMINE-acetaminophen (TYLENOL PM)  MG tablet   2019 at Unknown time Care Giver Yes Yes   Sig: Take 2 tablets by mouth At Bedtime   furosemide (LASIX) 40 MG tablet 7/3/2019 at Unknown time Care   Giver No Yes   Sig: Take 1 tablet  (40 mg) by mouth daily   latanoprost (XALATAN) 0.005 % ophthalmic solution 7/2/2019 at   Unknown time Care Giver Yes Yes   Sig: Place 1 drop into the right eye At Bedtime   lidocaine-prilocaine (EMLA) cream  Care Giver No Yes   Sig: Apply to port site 1 hour prior to access   metoprolol succinate ER (TOPROL-XL) 50 MG 24 hr tablet 7/3/2019   at Unknown time Care Giver No Yes   Sig: Take 3 tablets (150 mg) by mouth 2 times daily   oxyCODONE (ROXICODONE) 5 MG tablet  Care Giver No Yes   Sig: Take 1 tablet (5 mg) by mouth every 4 hours as needed for   pain maximum 4 tablet(s) per day   polyethylene glycol (MIRALAX/GLYCOLAX) powder  Care Giver Yes Yes     Sig: Take 17 g by mouth daily as needed for constipation    potassium chloride (KLOR-CON) 20 MEQ packet 7/3/2019 at Unknown   time Care Giver No Yes   Sig: Take 20 mEq by mouth 2 times daily   prochlorperazine (COMPAZINE) 10 MG tablet  Care Giver No Yes   Sig: Take 1 tablet (10 mg) by mouth every 6 hours as needed for   nausea or vomiting   vitamin D3 (VITAMIN D3) 1000 units (25 mcg) tablet 7/3/2019 at   Unknown time Care Giver Yes Yes   Sig: Take 1,000 Units by mouth daily   warfarin (COUMADIN) 4 MG tablet 7/2/2019 at Unknown time Care   Giver Yes Yes   Sig: Take one tablet (4 mg) by mouth on Tuesdays and Saturdays;   take one-half tablet ( 2 mg) on all other days of the week.      Facility-Administered Medications: None     Allergies   Allergies   Allergen Reactions     Blood Transfusion Related (Informational Only)      Blood antigens related to receiving Darzelex-AG     Penicillin [Penicillins] Rash     Blotches on chest        Social History   I have reviewed this patient's social history and updated it with   pertinent information if needed. Amira Arreola  reports that   she has never smoked. She has never used smokeless tobacco. She   reports that she does not drink alcohol or use drugs.    Family History   I have reviewed this patient's family history and  updated it with   pertinent information if needed.   Family History   Problem Relation Age of Onset     Heart Disease Father      C.A.D. Mother      Cerebrovascular Disease Brother      Family History Negative Sister      Family History Negative Sister      Family History Negative Brother        Review of Systems   Comprehensive review of systems was performed with pertinent   positives and negatives listed in assessment and plan section.    Physical Exam   Temp: 98.2  F (36.8  C) Temp src: Oral BP: 101/62 Pulse: 112   Heart Rate: 133 Resp: 20 SpO2: 96 % O2 Device: None (Room air)    Vital Signs with Ranges  Temp:  [97.5  F (36.4  C)-98.2  F (36.8  C)] 98.2  F (36.8  C)  Pulse:  [112] 112  Heart Rate:  [] 133  Resp:  [17-20] 20  BP: ()/(57-82) 101/62  SpO2:  [96 %-98 %] 96 %  128 lbs 1.6 oz    Constitutional: awake, alert, cooperative, no apparent distress,   and appears stated age  Eyes: Lids and lashes normal, pupils equal, round extra ocular   muscles intact, sclera clear, conjunctiva normal  ENT: Normocephalic, without obvious abnormality, atraumatic,   sinuses nontender on palpation, external ears without lesions,   oral pharynx with moist mucous membranes, tonsils without   erythema or exudates, gums normal and good dentition.  Hematologic / Lymphatic: no supraclavicular lymphadenopathy  Respiratory: No increased work of breathing, good air exchange,   clear to auscultation bilaterally, no crackles or wheezing  Cardiovascular: irregularly irregular rhythm  GI: no ascites  Skin: no bruising or bleeding  Musculoskeletal: There is no redness, warmth, or swelling of the   joints.  Full range of motion noted.  Neurologic: Awake, alert,is normal.  Neuropsychiatric: General: normal, calm and normal eye contact    Data   I personally reviewed all recent ECGs and images.  Results for orders placed or performed during the hospital   encounter of 07/03/19 (from the past 24 hour(s))   Basic metabolic panel    Result Value Ref Range    Sodium 142 133 - 144 mmol/L    Potassium 3.5 3.4 - 5.3 mmol/L    Chloride 108 94 - 109 mmol/L    Carbon Dioxide 24 20 - 32 mmol/L    Anion Gap 10 3 - 14 mmol/L    Glucose 235 (H) 70 - 99 mg/dL    Urea Nitrogen 35 (H) 7 - 30 mg/dL    Creatinine 0.79 0.52 - 1.04 mg/dL    GFR Estimate 68 >60 mL/min/[1.73_m2]    GFR Estimate If Black 78 >60 mL/min/[1.73_m2]    Calcium 8.7 8.5 - 10.1 mg/dL   Calcium ionized   Result Value Ref Range    Calcium Ionized 4.8 4.4 - 5.2 mg/dL   INR   Result Value Ref Range    INR 1.79 (H) 0.86 - 1.14   INR   Result Value Ref Range    INR 1.29 (H) 0.86 - 1.14   Basic metabolic panel   Result Value Ref Range    Sodium 141 133 - 144 mmol/L    Potassium 3.9 3.4 - 5.3 mmol/L    Chloride 108 94 - 109 mmol/L    Carbon Dioxide 28 20 - 32 mmol/L    Anion Gap 5 3 - 14 mmol/L    Glucose 147 (H) 70 - 99 mg/dL    Urea Nitrogen 34 (H) 7 - 30 mg/dL    Creatinine 0.58 0.52 - 1.04 mg/dL    GFR Estimate 83 >60 mL/min/[1.73_m2]    GFR Estimate If Black >90 >60 mL/min/[1.73_m2]    Calcium 8.4 (L) 8.5 - 10.1 mg/dL   CBC with platelets   Result Value Ref Range    WBC 11.9 (H) 4.0 - 11.0 10e9/L    RBC Count 3.37 (L) 3.8 - 5.2 10e12/L    Hemoglobin 10.8 (L) 11.7 - 15.7 g/dL    Hematocrit 32.3 (L) 35.0 - 47.0 %    MCV 96 78 - 100 fl    MCH 32.0 26.5 - 33.0 pg    MCHC 33.4 31.5 - 36.5 g/dL    RDW 18.2 (H) 10.0 - 15.0 %    Platelet Count 212 150 - 450 10e9/L   Calcium ionized whole blood (Add on or recollect)   Result Value Ref Range    Calcium Ionized Whole Blood 4.9 4.4 - 5.2 mg/dL   Lactate Dehydrogenase   Result Value Ref Range    Lactate Dehydrogenase 214 81 - 234 U/L            Cell count with differential fluid   Result Value Ref Range    Body Fluid Analysis Source Pleural fluid     % Neutrophils Fluid 8 %    % Lymphocytes Fluid 83 %    % Mono/Macro Fluid 5 %    % Other Cells Fluid 4 %    Color Fluid Yellow     Appearance Fluid Slightly Cloudy     WBC Fluid 692 /uL   Fluid Culture  Aerobic Bacterial   Result Value Ref Range    Specimen Description Pleural fluid Left     Culture Micro Culture negative monitoring continues    Glucose fluid   Result Value Ref Range    Glucose Fluid Source Pleural fluid     Glucose Fluid 148 mg/dL   Gram stain   Result Value Ref Range    Specimen Description Pleural fluid Left     Gram Stain No organisms seen     Gram Stain       Moderate  WBC'S seen  predominantly mononuclear cells      Gram Stain       Quantification of host cells and microbiological organisms was done on a cytocentrifuged   preparation.     Lactate dehydrogenase fluid   Result Value Ref Range    LD Fluid Source Pleural fluid     Lactate Dehydrogenase Fluid 118 U/L   Protein fluid   Result Value Ref Range    Protein Total Fluid Source Pleural fluid     Protein Total Fluid 1.7 g/dL   XR Chest 1 View    Narrative    XR CHEST 1 VW 7/5/2019 11:35 AM    HISTORY: Follow-up after thoracentesis.    COMPARISON: 7/3/2019    FINDINGS: Small right and moderate left effusions. No pneumothorax.  Stable heart size.      Impression    IMPRESSION: No pneumothorax.    JANI NEWBERRY MD   US Thoracentesis    Narrative    ULTRASOUND GUIDED THORACENTESIS  7/5/2019 11:40 AM     HISTORY: Left pleural effusion.    FINDINGS: Ultrasound was used to evaluate for the presence and best  approach for drainage of a pleural effusion. Written and oral informed  consent was obtained. A pause for the cause procedure to verify the  correct patient and correct procedure. The skin overlying the left  chest posteriorly was prepped and draped in the usual sterile fashion.  The subcutaneous tissues were anesthetized with 7 mL 1% lidocaine. A  catheter was advanced into the pleural space and 250 mL of  thomas  colored fluid was drained. Patient was monitored by nurse under my  direct supervision throughout the exam. Ultrasound images were  permanently stored.  There were no immediate complications. Patient  left the ultrasound suite in  satisfactory condition.      Impression    IMPRESSION: Technically successful thoracentesis without immediate  complications.    ROSA VALDEZ MD   Care Transition RN/SW IP Consult    Narrative    Deann Leon RICKY, [facility]     7/5/2019  2:39 PM  Care Transition Initial Assessment - SW     Met with: Patient,  Tom, and daughter Desiree  Active Problems:    ACS (acute coronary syndrome) (H)       DATA  Lives With: spouse      Quality of Family Relationships: involved, supportive  Description of Support System: Supportive, Involved  Who is your support system?:   Support Assessment: Adequate family and caregiver support.   Identified issues/concerns regarding health management:       Quality of Family Relationships: involved, supportive     Per care transitions consult for discharge planning and tcu   placement on discharge.  Patient was admitted on 7-3-19 with   acute coronary syndrome.  The tentative date of discharge is   7-6-19 or 7-7-19.  Reviewed chart and spoke with patient,   , and daughter Desiree regarding discharge plans.  Per   patient and daughter report, patient lives with her  in a   house with 6 steps to enter.  Once inside patient can remain on   the main level.  Patient and family are hoping that patient can   go to tcu on discharge.  Patient has been to [facility] in the past   and this would be the first choice.  Referrals sent, via   discharge on the double, to check bed a availability.  Received a   call back from [facility].  They have a bed available for patient   Saturday or Sunday.    ASSESSMENT  Cognitive Status:  awake and alert  Concerns to be addressed: discharge planning, tcu placement on   discharge.     PLAN  Financial costs for the patient includes N/A.  Patient given options and choices for discharge TCU choices.  Patient/family is agreeable to the plan?  Yes  Transportation/person available to transport on day of discharge    is TBD and have they been notified/set up  TBD  Patient Goals and Preferences: TCU placement on discharge.  Patient anticipates discharging to:  [place].    Will continue to follow and assist with a safe discharge plan.    Deann Leon MSTAMARA, [facility]  [phone]         Procalcitonin   Result Value Ref Range    Procalcitonin 0.07 ng/ml   Protein total   Result Value Ref Range    Protein Total 5.7 (L) 6.8 - 8.8 g/dL   EP Device    Narrative    PROCEDURES PERFORMED:   1. Ablation of atrioventricular node (AVN)  2. Implantation of a chamber-chamber pacemaker  3. Conscious sedation.   4. Cardiac fluoroscopy    INDICATION: Medical refractory AF    HISTORY OF PRESENT ILLNESS: This is a 86 year old year-old patient with a   history of medical refractory AFib who is referred for AVN ablation and   pacemaker implantation.    METHOD: The patient was prepped and draped in the usual manner.    Intravenous antibiotic was given. 1% lidocaine was infiltrated into the   left axillary vein area. This vein was access using the modified   Seldinger's technique. An incision was then made with a #15 blade. A   peelable sheath was then glide over the wire into the vein. A lead was   then advanced into the heart and was fixed into the right ventricular   septal area. Appropriate sensing and threshold were obtained. There was no   diaphragmatic stimulation with high output pacing. The sheath was then   peeled away. The lead was secured to the pectoralis muscle fascia with   O-Ethibond sutures. A pocket was then fashioned and was irrigated with   bacitracin solution. The lead was attached to the device and placed in the   pocket. The pocket was then closed with 2-0 and 4-0 Vicryl sutures.   Steri-Strips and an OpSite dressing were then placed over the incision.    A Piseco sheath was placed in the left subclavian vein. A large curve 5 mm   ablation catheter was then advanced into the heart. Ablation was then   performed using an EPT generator. Both catheter and sheath were then    removed. Hemostasis was achieved by direct manual pressure. The patient   was then transferred back to the room in stable condition.       ABLATION SUMMARY: Approximately 3 applications of radiofrequency ablation   were targeted at the AV node at 50 [address] and 60 degrees Celsius for 10-60   seconds after mapping of the AVN was done. Complete heart block was   achieved with a junctional escape rate of 40 beats per minute.    COMPLICATIONS: None.    CONCLUSION:  1. Uneventful ablation of AV node and implantation of a device pacemaker.    Lee Menchaca MD       EP Ablation    Narrative    PROCEDURES PERFORMED:   1. Ablation of atrioventricular node (AVN)  2. Implantation of a chamber-chamber pacemaker  3. Conscious sedation.   4. Cardiac fluoroscopy    INDICATION: Medical refractory AF    HISTORY OF PRESENT ILLNESS: This is a 86 year old year-old patient with a   history of medical refractory AFib who is referred for AVN ablation and   pacemaker implantation.    METHOD: The patient was prepped and draped in the usual manner.    Intravenous antibiotic was given. 1% lidocaine was infiltrated into the   left axillary vein area. This vein was access using the modified   Seldinger's technique. An incision was then made with a #15 blade. A   peelable sheath was then glide over the wire into the vein. A lead was   then advanced into the heart and was fixed into the right ventricular   septal area. Appropriate sensing and threshold were obtained. There was no   diaphragmatic stimulation with high output pacing. The sheath was then   peeled away. The lead was secured to the pectoralis muscle fascia with   O-Ethibond sutures. A pocket was then fashioned and was irrigated with   bacitracin solution. The lead was attached to the device and placed in the   pocket. The pocket was then closed with 2-0 and 4-0 Vicryl sutures.   Steri-Strips and an OpSite dressing were then placed over the incision.    A Wisdom sheath was placed in the  left subclavian vein. A large curve 5 mm   ablation catheter was then advanced into the heart. Ablation was then   performed using an EPT generator. Both catheter and sheath were then   removed. Hemostasis was achieved by direct manual pressure. The patient   was then transferred back to the room in stable condition.       ABLATION SUMMARY: Approximately 3 applications of radiofrequency ablation   were targeted at the AV node at 50 [address] and 60 degrees Celsius for 10-60   seconds after mapping of the AVN was done. Complete heart block was   achieved with a junctional escape rate of 40 beats per minute.    COMPLICATIONS: None.    CONCLUSION:  1. Uneventful ablation of AV node and implantation of a device pacemaker.    Lee Menchaca MD       Basic metabolic panel   Result Value Ref Range    Sodium 141 133 - 144 mmol/L    Potassium 3.9 3.4 - 5.3 mmol/L    Chloride 108 94 - 109 mmol/L    Carbon Dioxide 25 20 - 32 mmol/L    Anion Gap 8 3 - 14 mmol/L    Glucose 91 70 - 99 mg/dL    Urea Nitrogen 25 7 - 30 mg/dL    Creatinine 0.52 0.52 - 1.04 mg/dL    GFR Estimate 86 >60 mL/min/[1.73_m2]    GFR Estimate If Black >90 >60 mL/min/[1.73_m2]    Calcium 8.5 8.5 - 10.1 mg/dL   CBC with platelets   Result Value Ref Range    WBC 7.9 4.0 - 11.0 10e9/L    RBC Count 3.30 (L) 3.8 - 5.2 10e12/L    Hemoglobin 10.5 (L) 11.7 - 15.7 g/dL    Hematocrit 31.8 (L) 35.0 - 47.0 %    MCV 96 78 - 100 fl    MCH 31.8 26.5 - 33.0 pg    MCHC 33.0 31.5 - 36.5 g/dL    RDW 18.2 (H) 10.0 - 15.0 %    Platelet Count 183 150 - 450 10e9/L   INR   Result Value Ref Range    INR 1.36 (H) 0.86 - 1.14   X-ray Chest 2 vws*    Narrative    CHEST TWO VIEWS  7/6/2019 7:49 AM     HISTORY: New pacemaker. Evaluate for pneumothorax.    COMPARISON: 7/5/2019 chest x-ray 11:30.      Impression    IMPRESSION: No pneumothorax. Redemonstrated opacity lower left chest  consistent with moderate left effusion and probable associated  atelectasis. Small right effusion. Right sided  Port-A-Cath with tip in  right atrium. Stable heart size, no vascular congestion or acute  appearing infiltrate. Cardiac device over the left chest wall with  right ventricular lead is new. Marked thoracic kyphosis with mid and  lower thoracic vertebral compressions redemonstrated.    JENN POSEY MD

## 2019-07-06 NOTE — CONSULTS
Care Transition Initial Assessment - SW     Met with:    Active Problems:    ACS (acute coronary syndrome) (H)       DATA  Lives With: spouse, child(ezra), adult   Living Arrangements: house  Quality of Family Relationships: involved, supportive  Description of Support System: Supportive, Involved  Who is your support system?:   Support Assessment: Adequate family and caregiver support.   Identified issues/concerns regarding health management: SW following for discharge needs  Resources List: Skilled Nursing Facility     Quality of Family Relationships: involved, supportive  Transportation Anticipated: family or friend will provide    ASSESSMENT  Cognitive Status:    Concerns to be addressed: Patient is a 86 year old female who was admitted to the hospital for ACS. Prior to hospitalization, patient was living at home with spouse. Patient is anticipating to discharge to [facility] on 7/7. SW will discuss timing of transfer with family. SW will update [facility] with transport time and fax over orders/PAS/scripts when complete.      PLAN  Financial costs for the patient includes   Patient given options and choices for discharge to TCU  Patient/family is agreeable to the plan?  YES  Transportation/person available to transport on day of discharge  is family and have they been notified/set up TBD  Patient Goals and Preferences: [facility]  Patient anticipates discharging to: TCU    ADAN Zhang   *[phone]

## 2019-07-06 NOTE — PLAN OF CARE
7/5 had PPM placement and AV node ablation    Plan: post op PPM chest xray and interrogation    Neuro: disoriented to time, easily reorients    Cardiac: PPM VVIR , afib underlying    Respiratory: room air    Drips: none    Skin: Left thoracentesis site, Left chest PPM site, coccyx breakdown    GI/: No issues    Activity: assist of 1    Discharge:  will go to [place] Saturday or Sunday

## 2019-07-06 NOTE — PROGRESS NOTES
[facility]    Cardiology Progress Note     Assessment & Plan   Amira Arreola is a 86 year old female who was admitted on 7/3/2019. EP was consulted for recommendations regarding hypotension with rate control medications for atrial fibrillation.     1. Chronic atrial fibrillation with RVR status post permanent pacemaker implantation and AV node ablation 7/5/2019  2. Hypertension   3. Chronic diastolic heart failure  4. Severe pulmonary HTN    Doing well this morning.  Device interrogation shows that the device is functioning well without issues.  Chest x-ray shows no evidence of pneumothorax.  Given overall prognosis, would not favor treatment with either metoprolol or diltiazem. Consider diuresis vs therapeutic thoracentesis for pleural effusions.     The electrophysiology team will sign off this time.  Please page with any questions or concerns prior to her dismissal from the hospital.    Jake Daniel MD    Interval History   No overnight events.  She tolerated the pacemaker implantation well.  No acute complaints this morning.  No chest pain or chest discomfort.  No shortness of breath.    Physical Exam   Temp: 98  F (36.7  C) Temp src: Oral BP: 121/75   Heart Rate: 80 Resp: 16 SpO2: 97 % O2 Device: None (Room air) Oxygen Delivery: 2 LPM  Vitals:    07/03/19 1322 07/04/19 0500 07/05/19 0530   Weight: 55.7 kg (122 lb 14.4 oz) 57.1 kg (125 lb 14.1 oz) 58.1 kg (128 lb 1.6 oz)     Vital Signs with Ranges  Temp:  [98  F (36.7  C)-98.2  F (36.8  C)] 98  F (36.7  C)  Heart Rate:  [] 80  Resp:  [16-22] 16  BP: ()/(62-82) 121/75  SpO2:  [94 %-99 %] 97 %  I/O last 3 completed shifts:  In: 660 [P.O.:660]  Out: 425 [Urine:175; Drains:250]    Constitutional: No apparent distress.   Eyes: No xanthelasma or conjunctivitis  Respiratory: Decrease breath sounds at bases bilaterally. Crackles to auscultation.  Cardiovascular: Regular rate and rhythm. Normal S1 and S2. Soft systolic murmur.    Extremities: Trivial bilateral edema. Pacemaker site clean and dry without evidence of hematoma or bruising.   Neurologic: Moving all extremities. No facial assymmetry.  Psychiatric: Alert and oriented. Answers questions appropriately.     Medications     - MEDICATION INSTRUCTIONS -       - MEDICATION INSTRUCTIONS -       Reason anticoagulation order not selected       diltiazem (CARDIZEM) infusion ADULT Stopped (07/05/19 1506)     - MEDICATION INSTRUCTIONS -       - MEDICATION INSTRUCTIONS -       ACE/ARB/ARNI NOT PRESCRIBED       Warfarin Therapy Reminder         acyclovir  400 mg Oral BID     aspirin  325 mg Oral Daily     brinzolamide-brimonidine  1 drop Right Eye BID     heparin  5 mL Intracatheter Q28 Days     heparin lock flush  5-10 mL Intracatheter Q24H     latanoprost  1 drop Right Eye At Bedtime     sodium chloride (PF)  3 mL Intracatheter Q8H       Data   Results for orders placed or performed during the hospital encounter of 07/03/19 (from the past 24 hour(s))   Cell count with differential fluid   Result Value Ref Range    Body Fluid Analysis Source Pleural fluid     % Neutrophils Fluid 8 %    % Lymphocytes Fluid 83 %    % Mono/Macro Fluid 5 %    % Other Cells Fluid 4 %    Color Fluid Yellow     Appearance Fluid Slightly Cloudy     WBC Fluid 692 /uL   Fluid Culture Aerobic Bacterial   Result Value Ref Range    Specimen Description Pleural fluid Left     Culture Micro Culture negative monitoring continues    Glucose fluid   Result Value Ref Range    Glucose Fluid Source Pleural fluid     Glucose Fluid 148 mg/dL   Gram stain   Result Value Ref Range    Specimen Description Pleural fluid Left     Gram Stain No organisms seen     Gram Stain       Moderate  WBC'S seen  predominantly mononuclear cells      Gram Stain       Quantification of host cells and microbiological organisms was done on a cytocentrifuged   preparation.     Lactate dehydrogenase fluid   Result Value Ref Range    LD Fluid Source  Pleural fluid     Lactate Dehydrogenase Fluid 118 U/L   Protein fluid   Result Value Ref Range    Protein Total Fluid Source Pleural fluid     Protein Total Fluid 1.7 g/dL   XR Chest 1 View    Narrative    XR CHEST 1 VW 7/5/2019 11:35 AM    HISTORY: Follow-up after thoracentesis.    COMPARISON: 7/3/2019    FINDINGS: Small right and moderate left effusions. No pneumothorax.  Stable heart size.      Impression    IMPRESSION: No pneumothorax.    JANI NEWBERRY MD   US Thoracentesis    Narrative    ULTRASOUND GUIDED THORACENTESIS  7/5/2019 11:40 AM     HISTORY: Left pleural effusion.    FINDINGS: Ultrasound was used to evaluate for the presence and best  approach for drainage of a pleural effusion. Written and oral informed  consent was obtained. A pause for the cause procedure to verify the  correct patient and correct procedure. The skin overlying the left  chest posteriorly was prepped and draped in the usual sterile fashion.  The subcutaneous tissues were anesthetized with 7 mL 1% lidocaine. A  catheter was advanced into the pleural space and 250 mL of  thomas  colored fluid was drained. Patient was monitored by nurse under my  direct supervision throughout the exam. Ultrasound images were  permanently stored.  There were no immediate complications. Patient  left the ultrasound suite in satisfactory condition.      Impression    IMPRESSION: Technically successful thoracentesis without immediate  complications.    ROSA VALDEZ MD   Care Transition RN/SW IP Consult    Narrative    Deann Leon, [facility]     7/5/2019  2:39 PM  Care Transition Initial Assessment - SW     Met with: Patient,  Tom, and daughter Desiree  Active Problems:    ACS (acute coronary syndrome) (H)       DATA  Lives With: spouse      Quality of Family Relationships: involved, supportive  Description of Support System: Supportive, Involved  Who is your support system?:   Support Assessment: Adequate family and caregiver support.    Identified issues/concerns regarding health management:       Quality of Family Relationships: involved, supportive     Per care transitions consult for discharge planning and tcu   placement on discharge.  Patient was admitted on 7-3-19 with   acute coronary syndrome.  The tentative date of discharge is   7-6-19 or 7-7-19.  Reviewed chart and spoke with patient,   , and daughter Desiree regarding discharge plans.  Per   patient and daughter report, patient lives with her  in a   house with 6 steps to enter.  Once inside patient can remain on   the main level.  Patient and family are hoping that patient can   go to tcu on discharge.  Patient has been to [place] in the past   and this would be the first choice.  Referrals sent, via   discharge on the double, to check bed a availability.  Received a   call back from [place].  They have a bed available for patient   Saturday or Sunday.    ASSESSMENT  Cognitive Status:  awake and alert  Concerns to be addressed: discharge planning, tcu placement on   discharge.     PLAN  Financial costs for the patient includes N/A.  Patient given options and choices for discharge TCU choices.  Patient/family is agreeable to the plan?  Yes  Transportation/person available to transport on day of discharge    is TBD and have they been notified/set up TBD  Patient Goals and Preferences: TCU placement on discharge.  Patient anticipates discharging to:  [place].    Will continue to follow and assist with a safe discharge plan.    ALIE Arcos, [facility]  [phone]         Procalcitonin   Result Value Ref Range    Procalcitonin 0.07 ng/ml   Protein total   Result Value Ref Range    Protein Total 5.7 (L) 6.8 - 8.8 g/dL   EP Device    Narrative    PROCEDURES PERFORMED:   1. Ablation of atrioventricular node (AVN)  2. Implantation of a chamber-chamber pacemaker  3. Conscious sedation.   4. Cardiac fluoroscopy    INDICATION: Medical refractory AF    HISTORY OF PRESENT ILLNESS:  This is a 86 year old year-old patient with a   history of medical refractory AFib who is referred for AVN ablation and   pacemaker implantation.    METHOD: The patient was prepped and draped in the usual manner.    Intravenous antibiotic was given. 1% lidocaine was infiltrated into the   left axillary vein area. This vein was access using the modified   Seldinger's technique. An incision was then made with a #15 blade. A   peelable sheath was then glide over the wire into the vein. A lead was   then advanced into the heart and was fixed into the right ventricular   septal area. Appropriate sensing and threshold were obtained. There was no   diaphragmatic stimulation with high output pacing. The sheath was then   peeled away. The lead was secured to the pectoralis muscle fascia with   O-Ethibond sutures. A pocket was then fashioned and was irrigated with   bacitracin solution. The lead was attached to the device and placed in the   pocket. The pocket was then closed with 2-0 and 4-0 Vicryl sutures.   Steri-Strips and an OpSite dressing were then placed over the incision.    A [place] sheath was placed in the left subclavian vein. A large curve 5 mm   ablation catheter was then advanced into the heart. Ablation was then   performed using an EPT generator. Both catheter and sheath were then   removed. Hemostasis was achieved by direct manual pressure. The patient   was then transferred back to the room in stable condition.       ABLATION SUMMARY: Approximately 3 applications of radiofrequency ablation   were targeted at the AV node at [address] and 60 degrees Celsius for 10-60   seconds after mapping of the AVN was done. Complete heart block was   achieved with a junctional escape rate of 40 beats per minute.    COMPLICATIONS: None.    CONCLUSION:  1. Uneventful ablation of AV node and implantation of a device pacemaker.    Lee Menchaca MD       EP Ablation    Narrative    PROCEDURES PERFORMED:   1. Ablation of  atrioventricular node (AVN)  2. Implantation of a chamber-chamber pacemaker  3. Conscious sedation.   4. Cardiac fluoroscopy    INDICATION: Medical refractory AF    HISTORY OF PRESENT ILLNESS: This is a 86 year old year-old patient with a   history of medical refractory AFib who is referred for AVN ablation and   pacemaker implantation.    METHOD: The patient was prepped and draped in the usual manner.    Intravenous antibiotic was given. 1% lidocaine was infiltrated into the   left axillary vein area. This vein was access using the modified   Seldinger's technique. An incision was then made with a #15 blade. A   peelable sheath was then glide over the wire into the vein. A lead was   then advanced into the heart and was fixed into the right ventricular   septal area. Appropriate sensing and threshold were obtained. There was no   diaphragmatic stimulation with high output pacing. The sheath was then   peeled away. The lead was secured to the pectoralis muscle fascia with   O-Ethibond sutures. A pocket was then fashioned and was irrigated with   bacitracin solution. The lead was attached to the device and placed in the   pocket. The pocket was then closed with 2-0 and 4-0 Vicryl sutures.   Steri-Strips and an OpSite dressing were then placed over the incision.    A [place] sheath was placed in the left subclavian vein. A large curve 5 mm   ablation catheter was then advanced into the heart. Ablation was then   performed using an EPT generator. Both catheter and sheath were then   removed. Hemostasis was achieved by direct manual pressure. The patient   was then transferred back to the room in stable condition.       ABLATION SUMMARY: Approximately 3 applications of radiofrequency ablation   were targeted at the AV node at [address] and 60 degrees Celsius for 10-60   seconds after mapping of the AVN was done. Complete heart block was   achieved with a junctional escape rate of 40 beats per minute.    COMPLICATIONS:  None.    CONCLUSION:  1. Uneventful ablation of AV node and implantation of a device pacemaker.    Lee Menchaca MD       Basic metabolic panel   Result Value Ref Range    Sodium 141 133 - 144 mmol/L    Potassium 3.9 3.4 - 5.3 mmol/L    Chloride 108 94 - 109 mmol/L    Carbon Dioxide 25 20 - 32 mmol/L    Anion Gap 8 3 - 14 mmol/L    Glucose 91 70 - 99 mg/dL    Urea Nitrogen 25 7 - 30 mg/dL    Creatinine 0.52 0.52 - 1.04 mg/dL    GFR Estimate 86 >60 mL/min/[1.73_m2]    GFR Estimate If Black >90 >60 mL/min/[1.73_m2]    Calcium 8.5 8.5 - 10.1 mg/dL   CBC with platelets   Result Value Ref Range    WBC 7.9 4.0 - 11.0 10e9/L    RBC Count 3.30 (L) 3.8 - 5.2 10e12/L    Hemoglobin 10.5 (L) 11.7 - 15.7 g/dL    Hematocrit 31.8 (L) 35.0 - 47.0 %    MCV 96 78 - 100 fl    MCH 31.8 26.5 - 33.0 pg    MCHC 33.0 31.5 - 36.5 g/dL    RDW 18.2 (H) 10.0 - 15.0 %    Platelet Count 183 150 - 450 10e9/L   INR   Result Value Ref Range    INR 1.36 (H) 0.86 - 1.14   X-ray Chest 2 vws*    Narrative    CHEST TWO VIEWS  7/6/2019 7:49 AM     HISTORY: New pacemaker. Evaluate for pneumothorax.    COMPARISON: 7/5/2019 chest x-ray 11:30.      Impression    IMPRESSION: No pneumothorax. Redemonstrated opacity lower left chest  consistent with moderate left effusion and probable associated  atelectasis. Small right effusion. Right sided Port-A-Cath with tip in  right atrium. Stable heart size, no vascular congestion or acute  appearing infiltrate. Cardiac device over the left chest wall with  right ventricular lead is new. Marked thoracic kyphosis with mid and  lower thoracic vertebral compressions redemonstrated.    JENN POSEY MD   EKG 12-lead, tracing only   Result Value Ref Range    Interpretation ECG Click View Image link to view waveform and result

## 2019-07-07 VITALS
TEMPERATURE: 98.3 F | WEIGHT: 131.1 LBS | SYSTOLIC BLOOD PRESSURE: 112 MMHG | HEIGHT: 65 IN | OXYGEN SATURATION: 97 % | RESPIRATION RATE: 16 BRPM | DIASTOLIC BLOOD PRESSURE: 67 MMHG | BODY MASS INDEX: 21.84 KG/M2 | HEART RATE: 81 BPM

## 2019-07-07 LAB
ANION GAP SERPL CALCULATED.3IONS-SCNC: 5 MMOL/L (ref 3–14)
BUN SERPL-MCNC: 21 MG/DL (ref 7–30)
CALCIUM SERPL-MCNC: 8.4 MG/DL (ref 8.5–10.1)
CHLORIDE SERPL-SCNC: 109 MMOL/L (ref 94–109)
CO2 SERPL-SCNC: 28 MMOL/L (ref 20–32)
CREAT SERPL-MCNC: 0.52 MG/DL (ref 0.52–1.04)
GFR SERPL CREATININE-BSD FRML MDRD: 86 ML/MIN/{1.73_M2}
GLUCOSE SERPL-MCNC: 94 MG/DL (ref 70–99)
INR PPP: 1.61 (ref 0.86–1.14)
POTASSIUM SERPL-SCNC: 3.8 MMOL/L (ref 3.4–5.3)
SODIUM SERPL-SCNC: 142 MMOL/L (ref 133–144)

## 2019-07-07 PROCEDURE — 25000132 ZZH RX MED GY IP 250 OP 250 PS 637: Mod: GY | Performed by: HOSPITALIST

## 2019-07-07 PROCEDURE — 25000132 ZZH RX MED GY IP 250 OP 250 PS 637: Mod: GY | Performed by: INTERNAL MEDICINE

## 2019-07-07 PROCEDURE — 25000132 ZZH RX MED GY IP 250 OP 250 PS 637: Mod: GY | Performed by: NURSE PRACTITIONER

## 2019-07-07 PROCEDURE — 80048 BASIC METABOLIC PNL TOTAL CA: CPT | Performed by: HOSPITALIST

## 2019-07-07 PROCEDURE — 99239 HOSP IP/OBS DSCHRG MGMT >30: CPT | Performed by: HOSPITALIST

## 2019-07-07 PROCEDURE — 25000128 H RX IP 250 OP 636: Performed by: HOSPITALIST

## 2019-07-07 PROCEDURE — 85610 PROTHROMBIN TIME: CPT | Performed by: HOSPITALIST

## 2019-07-07 PROCEDURE — 40000257 ZZH STATISTIC CONSULT NO CHARGE VASC ACCESS

## 2019-07-07 RX ORDER — WARFARIN SODIUM 2 MG/1
2 TABLET ORAL
Status: DISCONTINUED | OUTPATIENT
Start: 2019-07-07 | End: 2019-07-07 | Stop reason: HOSPADM

## 2019-07-07 RX ORDER — OXYCODONE AND ACETAMINOPHEN 5; 325 MG/1; MG/1
1 TABLET ORAL EVERY 4 HOURS PRN
Qty: 30 TABLET | Refills: 0 | Status: SHIPPED | OUTPATIENT
Start: 2019-07-07 | End: 2019-08-07

## 2019-07-07 RX ORDER — FUROSEMIDE 20 MG
20 TABLET ORAL DAILY
Status: DISCONTINUED | OUTPATIENT
Start: 2019-07-07 | End: 2019-07-07 | Stop reason: HOSPADM

## 2019-07-07 RX ORDER — POTASSIUM CHLORIDE 1.5 G/1.58G
20 POWDER, FOR SOLUTION ORAL DAILY
Qty: 60 PACKET | Refills: 1 | DISCHARGE
Start: 2019-07-07 | End: 2019-09-30

## 2019-07-07 RX ORDER — FUROSEMIDE 20 MG
20 TABLET ORAL DAILY
DISCHARGE
Start: 2019-07-07 | End: 2019-08-29

## 2019-07-07 RX ADMIN — ASPIRIN 325 MG ORAL TABLET 325 MG: 325 PILL ORAL at 08:08

## 2019-07-07 RX ADMIN — ACYCLOVIR 400 MG: 400 TABLET ORAL at 08:08

## 2019-07-07 RX ADMIN — BRINZOLAMIDE/BRIMONIDINE TARTRATE 1 DROP: 10; 2 SUSPENSION/ DROPS OPHTHALMIC at 09:10

## 2019-07-07 RX ADMIN — OXYCODONE HYDROCHLORIDE AND ACETAMINOPHEN 1 TABLET: 5; 325 TABLET ORAL at 08:08

## 2019-07-07 RX ADMIN — SODIUM CHLORIDE, PRESERVATIVE FREE 5 ML: 5 INJECTION INTRAVENOUS at 06:30

## 2019-07-07 RX ADMIN — HEPARIN SODIUM (PORCINE) LOCK FLUSH IV SOLN 100 UNIT/ML 5 ML: 100 SOLUTION at 12:17

## 2019-07-07 RX ADMIN — FUROSEMIDE 20 MG: 20 TABLET ORAL at 10:45

## 2019-07-07 ASSESSMENT — ACTIVITIES OF DAILY LIVING (ADL)
ADLS_ACUITY_SCORE: 30
ADLS_ACUITY_SCORE: 30
ADLS_ACUITY_SCORE: 32
ADLS_ACUITY_SCORE: 30

## 2019-07-07 ASSESSMENT — MIFFLIN-ST. JEOR: SCORE: 1035.55

## 2019-07-07 NOTE — PLAN OF CARE
Patient disoriented to time intermittently. Tele: Paced w/o spikes (PPM) and underlying afib. Vitals stable. PPM dressing CDI. Right port heparin locked. Lung sounds diminished. Skin breakdown on coccyx. Multiple areas of blanchable redness. Plan for [place] Sunday.

## 2019-07-07 NOTE — DISCHARGE SUMMARY
[facility]  Hospitalist Discharge Summary       Date of Admission:  7/3/2019  Date of Discharge:  7/7/2019  Discharging Provider: Mauricio Jones MD      Discharge Diagnoses     Metabolic encephalopathy, resolved    Hypercalcemia    Chronic atrial fibrillation with RVR    HTN    Chronic diastolic heart failure    Severe pulmonary HTN    Hx of SVT    Metastatic Multiple myeloma     Severe malnutrition    Deconditioning    Follow-ups Needed After Discharge   Follow-up Appointments     Follow Up and recommended labs and tests      Follow up with jail physician.  The following labs/tests are   recommended: BMP in 5-7 days.    [facility] clinic follow up.              Unresulted Labs Ordered in the Past 30 Days of this Admission     Date and Time Order Name Status Description    7/5/2019 1630 [place]/Lambda Free Light Chains Quantitative In process     7/4/2019 1125 Cytology non gyn In process     7/4/2019 1125 Fluid Culture Aerobic Bacterial Preliminary     7/3/2019 0921 Blood culture Preliminary     7/3/2019 0921 Blood culture Preliminary       These results will be followed up by hospitalist/TCU MD    Discharge Disposition   Discharged to nursing home  Condition at discharge: Stable    Hospital Course   Amira Arreola is a 86 year old female admitted on 7/3/2019.  Presents from cardiology clinic where noted to have hypotension and AMS.  Complicated history of chronic atrial fibrillation anticoagulated on warfarin, hypertension, chronic diastolic heart failure, pulmonary hypertension, SVT, metastatic multiple myeloma.  Admitted to [facility] from 6/28- 7/1/2019 for atrial fibrillation with RVR, acute on chronic diastolic heart failure, bilateral pleural effusions.     Metabolic encephalopathy, resolved  Suspect due to combination hypovolemia and hypercalcemia (corrected calcium level 11). Afebrile without leukocytosis, UA unremarkable, CXR unchanged from prior. Received 500 ml NS 7/3 with marked  improvement in mentation 7/4.  - remains at baseline mental status per family.  - blood cultures negative so far.  -Of note, patient is on Benadryl at HS and Oxycodone given cancer pain. These could also have contributed. Benadryl discontinued. Minimize percocet dose as able.      Hypercalcemia: likely related to dehydration and Myeloma  Corrected calcium level of 11 at admission, normalized with IVF.  - as hypercalcemia was mild and corrected quickly; holding bisphosphonate therapy, but will need monitoring outpatient  - discontinued PTA vitamin D this stay     Chronic atrial fibrillation with RVR  HTN  Chronic diastolic heart failure  Severe pulmonary HTN  Hx of SVT  [PTA diltiazem, metoprolol succinate, Lasix, Coumadin]  Two recent hospitalizations for A-fib with RVR and acute on chronic diastolic CHF.  Presented to cardiology follow up appt 7/3 and found to be hypotensive to 60's systolic, likely due to combination of CCB and BB.  In A-fib with RVR at arrival, serial trop negative.  Most recent TTE 3/2019 shows LVEF 55-60% with moderate MR and TR, severe pulmonary hypertension.  - Initiated dilt gtt which was discontinued after ablation  - s/p AVN ablation and PPM 7/5  - PTA lasix held due to hypovolemia. Appears euvolemic, weight trended up and now at her discharge weight from recent hospitalization. Discussed with cardiology service. Lasix resumed at lower dose at 20 mg daily and Potasium supplement also decreased to 20 mEq daily. Needs follow up at [facility] clinic and assess volume status and adjust dose of diuretic as well as BB and CCB.  - INR reversed with Vit K 7/4 for thoracentesis then resumed.     Left pleural effusion  Admission CXR with left pleural effusion, largely unchanged from prior.  No hypoxia and VBG with pO2, low pCO2. Possible cardiac.  - thoracentesis 7/5 with 250cc out; analysis shows transudative in nature, possibly related to CHF  - new leukocytosis 7/5, remains afebrile without signs of  pneumonia,  procal negative and WBC normal now  - encourage IS       Goals of Care  Metastatic Multiple myeloma   Deconditioning  Follows with  of oncology, chemo is on hold due to her continued physical decline. Per family, patient has been showing signs of an underlying dementia, she becomes more confused in the evening hours, and is unable to care for herself at home. See progress note 7/4 regarding goals of care discussion.  - palliative care assistance regarding goals of care much appreciated  - family continues to discuss long term goals of care but currently wishing to pursue restorative therapies  - family plan to follow up with Dr Roberts after discharge.  - PT/OT consult, family interested in TCU - discussed with social work as well      Severe malnutrition  Has experienced >5% weight loss in 1 month, has subcutaneous fat and muscle loss on exam.    Consultations This Hospital Stay   CARDIOLOGY IP CONSULT  CARDIAC REHAB IP CONSULT  PALLIATIVE CARE ADULT IP CONSULT  PALLIATIVE CARE ADULT IP CONSULT  SOCIAL WORK IP CONSULT  WOUND OSTOMY CONTINENCE NURSE  IP CONSULT  PHARMACY TO DOSE PHYTONADIONE  ELECTROPHYSIOLOGY IP CONSULT  PHARMACY TO DOSE WARFARIN  PHYSICAL THERAPY ADULT IP CONSULT  OCCUPATIONAL THERAPY ADULT IP CONSULT  CARE TRANSITION RN/SW IP CONSULT  PHYSICAL THERAPY ADULT IP CONSULT  OCCUPATIONAL THERAPY ADULT IP CONSULT    Code Status   DNR/DNI    Time Spent on this Encounter   I, Mauricio Jones, personally saw the patient today and spent greater than 30 minutes discharging this patient.     Mauricio Jones MD  [facility]  ______________________________________________________________________    Physical Exam   Vital Signs: Temp: 98.3  F (36.8  C) Temp src: Oral BP: 112/67   Heart Rate: 83 Resp: 16 SpO2: 97 % O2 Device: None (Room air)    Weight: 131 lbs 1.6 oz     General Appearance: Well developed, well nourished elderly female in NAD  Respiratory: Diminished Lt base  with basilar crackles, no wheezing or tachypnea.  Cardiovascular:  S1/s2 regular. No tachycardia. No murmur.   GI: normal bowel sounds  Lymph:  trace peripheral edema  Other:  Alert and appropriate, cranial nerves grossly intact        Primary Care Physician   Addy Frias    Discharge Orders      Discharge Instructions - Keep incision dry for 72 hours    Keep incision dry for 72 hours (unless Derma Barber was applied)     Discharge Instructions - Follow up with Device Check RN     Follow up with Device Check RN in 7-10 days.     General info for SNF    Length of Stay Estimate: Short Term Care: Estimated # of Days <30  Condition at Discharge: Stable  Level of care:skilled   Rehabilitation Potential: Good  Admission H&P remains valid and up-to-date: Yes  Recent Chemotherapy: On dexamethasone for multiple myeloma  Use Nursing Home Standing Orders: Yes     Mantoux instructions    Give two-step Mantoux (PPD) Per Facility Policy Yes     Reason for your hospital stay    Hypotension due to dehydration.     Daily weights    Call Provider for weight gain of more than 2 pounds per day or 5 pounds per week.     Follow Up and recommended labs and tests    Follow up with [facility] physician.  The following labs/tests are recommended: BMP in 5-7 days.    [facility] clinic follow up.     Activity - Up with nursing assistance     DNR/DNI     Physical Therapy Adult Consult    Evaluate and treat as clinically indicated.    Reason:  CHF, multiple myeloma, deconditioning.     Occupational Therapy Adult Consult    Evaluate and treat as clinically indicated.    Reason:  CHF and multiple myeloma, cognitive impairment. Deconditioning.     Fall precautions     Advance Diet as Tolerated    Follow this diet upon discharge: Orders Placed This Encounter      Room Service      Snacks/Supplements Adult: Other; Vanilla milkshake + 2pkts benepro; Between Meals      2 Gram Sodium Diet       Significant Results and Procedures   Most Recent 3  CBC's:  Recent Labs   Lab Test 07/06/19  0556 07/05/19  0400 07/03/19  0925   WBC 7.9 11.9* 9.4   HGB 10.5* 10.8* 13.2   MCV 96 96 96    212 208     Most Recent 3 BMP's:  Recent Labs   Lab Test 07/07/19  0634 07/06/19  0556 07/05/19  0400    141 141   POTASSIUM 3.8 3.9 3.9   CHLORIDE 109 108 108   CO2 28 25 28   BUN 21 25 34*   CR 0.52 0.52 0.58   ANIONGAP 5 8 5   BRADLEY 8.4* 8.5 8.4*   GLC 94 91 147*     Most Recent 2 LFT's:  Recent Labs   Lab Test 07/03/19 2045 06/28/19  2114   AST 22 48*   ALT 32 79*   ALKPHOS 52 62   BILITOTAL 0.6 0.5     Most Recent 3 INR's:  Recent Labs   Lab Test 07/07/19  0634 07/06/19  0556 07/05/19  0400   INR 1.61* 1.36* 1.29*   ,   Results for orders placed or performed during the hospital encounter of 07/03/19   Chest XR,  PA & LAT    Narrative    CHEST TWO VIEWS July 3, 2019 10:01 AM     HISTORY: Check for pulmonary edema or pneumonia, hypotension and  weakness.      Impression    IMPRESSION: Left basilar opacity compatible with a combination of  pleural effusion and parenchymal opacity, possibly unchanged compared  to 7/1/2019 given differences in positioning. Small right pleural  effusion and parenchymal opacity is also suspected. Mid and lower  thoracic spinal compression fractures are noted.    ZAIDA CLAROS MD   US Thoracentesis    Narrative    ULTRASOUND GUIDED THORACENTESIS  7/5/2019 11:40 AM     HISTORY: Left pleural effusion.    FINDINGS: Ultrasound was used to evaluate for the presence and best  approach for drainage of a pleural effusion. Written and oral informed  consent was obtained. A pause for the cause procedure to verify the  correct patient and correct procedure. The skin overlying the left  chest posteriorly was prepped and draped in the usual sterile fashion.  The subcutaneous tissues were anesthetized with 7 mL 1% lidocaine. A  catheter was advanced into the pleural space and 250 mL of  thomas  colored fluid was drained. Patient was monitored by nurse  under my  direct supervision throughout the exam. Ultrasound images were  permanently stored.  There were no immediate complications. Patient  left the ultrasound suite in satisfactory condition.      Impression    IMPRESSION: Technically successful thoracentesis without immediate  complications.    ROSA VALDEZ MD   XR Chest 1 View    Narrative    XR CHEST 1 VW 7/5/2019 11:35 AM    HISTORY: Follow-up after thoracentesis.    COMPARISON: 7/3/2019    FINDINGS: Small right and moderate left effusions. No pneumothorax.  Stable heart size.      Impression    IMPRESSION: No pneumothorax.    JANI NEWBERRY MD   X-ray Chest 2 vws*    Narrative    CHEST TWO VIEWS  7/6/2019 7:49 AM     HISTORY: New pacemaker. Evaluate for pneumothorax.    COMPARISON: 7/5/2019 chest x-ray 11:30.      Impression    IMPRESSION: No pneumothorax. Redemonstrated opacity lower left chest  consistent with moderate left effusion and probable associated  atelectasis. Small right effusion. Right sided Port-A-Cath with tip in  right atrium. Stable heart size, no vascular congestion or acute  appearing infiltrate. Cardiac device over the left chest wall with  right ventricular lead is new. Marked thoracic kyphosis with mid and  lower thoracic vertebral compressions redemonstrated.    JENN POSEY MD       Discharge Medications   Current Discharge Medication List      START taking these medications    Details   oxyCODONE-acetaminophen (PERCOCET) 5-325 MG tablet Take 1 tablet by mouth every 4 hours as needed for breakthrough pain  Qty: 30 tablet, Refills: 0    Comments: Maximum 4 tabs per day.  Associated Diagnoses: Cancer, metastatic to bone (H)         CONTINUE these medications which have CHANGED    Details   furosemide (LASIX) 20 MG tablet Take 1 tablet (20 mg) by mouth daily    Associated Diagnoses: Chronic diastolic heart failure (H)      potassium chloride (KLOR-CON) 20 MEQ packet Take 20 mEq by mouth daily  Qty: 60 packet, Refills: 1     Associated Diagnoses: Hypokalemia         CONTINUE these medications which have NOT CHANGED    Details   acetaminophen (TYLENOL) 500 MG tablet Take 500 mg by mouth every 6 hours as needed for mild pain       acyclovir (ZOVIRAX) 400 MG tablet Take 1 tablet (400 mg) by mouth 2 times daily  Qty: 60 tablet, Refills: 3    Associated Diagnoses: Acute on chronic congestive heart failure, unspecified heart failure type (H); Multiple myeloma not having achieved remission (H)      Carboxymethylcellulose Sod PF (REFRESH PLUS) 0.5 % SOLN ophthalmic solution 1 drop 4 times daily as needed for dry eyes      dexamethasone (DECADRON) 4 MG tablet Take 20mg (5 tablets) by mouth every week.  Qty: 28 tablet, Refills: 3    Associated Diagnoses: Multiple myeloma not having achieved remission (H)      latanoprost (XALATAN) 0.005 % ophthalmic solution Place 1 drop into the right eye At Bedtime      lidocaine-prilocaine (EMLA) cream Apply to port site 1 hour prior to access  Qty: 30 g, Refills: 1    Associated Diagnoses: Multiple myeloma not having achieved remission (H)      Multiple Vitamins-Minerals (DAILY MULTIVITAMIN) CAPS Take 1 tablet by mouth daily     Associated Diagnoses: Routine general medical examination at a health care facility      polyethylene glycol (MIRALAX/GLYCOLAX) powder Take 17 g by mouth daily as needed for constipation     Associated Diagnoses: Bilateral leg edema      prochlorperazine (COMPAZINE) 10 MG tablet Take 1 tablet (10 mg) by mouth every 6 hours as needed for nausea or vomiting  Qty: 30 tablet, Refills: 1    Associated Diagnoses: Multiple myeloma not having achieved remission (H)      SIMBRINZA 1-0.2 % ophthalmic suspension Place 1 drop into the right eye 2 times daily 1 drop AM and PM  Refills: 2      Sodium Fluoride (SF 5000 PLUS) 1.1 % CREA Apply to affected area 3 times daily      warfarin (COUMADIN) 4 MG tablet Take one tablet (4 mg) by mouth on Tuesdays and Saturdays; take one-half tablet ( 2 mg) on  all other days of the week.         STOP taking these medications       diltiazem ER (DILT-XR) 180 MG 24 hr capsule Comments:   Reason for Stopping:         diphenhydrAMINE-acetaminophen (TYLENOL PM)  MG tablet Comments:   Reason for Stopping:         metoprolol succinate ER (TOPROL-XL) 50 MG 24 hr tablet Comments:   Reason for Stopping:         oxyCODONE (ROXICODONE) 5 MG tablet Comments:   Reason for Stopping:         vitamin D3 (VITAMIN D3) 1000 units (25 mcg) tablet Comments:   Reason for Stopping:             Allergies   Allergies   Allergen Reactions     Blood Transfusion Related (Informational Only)      Blood antigens related to receiving Darzelex-AG     Penicillin [Penicillins] Rash     Blotches on chest

## 2019-07-07 NOTE — PROGRESS NOTES
LUIGI  I: LUIGI was updated that patient would be ready for discharge today to [facility]. LUIGI spoke with admissions and bed is available anytime today. LUIGI spoke with patient's daughter Yesi and she will plan to transport via [facility] at 1300. LUIGI updated [facility] staff. LUIGI will fax orders/APS/scripts when complete.     Orders faxed.    P: LUIGI will continue to follow and assist as needed.    Libertad Paulino, ADAN   *[phone]

## 2019-07-07 NOTE — DISCHARGE INSTRUCTIONS
AV Node Ablation & ICD or Pacemaker Implant Discharge Instructions    After you go home:      Have an adult stay with you until tomorrow.    You may resume your normal diet.       For 24 hours - due to the sedation you received:    Relax and take it easy.    Do NOT make any important or legal decisions.    Do NOT drive or operate machines at home or at work.    Do NOT drink alcohol.    Care of Chest Incision:      Keep the bandage on at least 3 days. You may remove the dressing on ***. Change it only if it gets loose or soaked. If you need to change it, use 4x4-inch gauze and a large clear bandage.     If there is a pressure dressing (gauze & tape) - 24 hours after your procedure you may remove ONLY the top dressing. Leave the bottom dressing on.    Leave the strips of tape on. They will fall off on their own, or we will remove them at your first check-up.    Check your wound daily for signs of infection, such as increased redness, severe swelling or draining. Fever may also be a sign of infection. Call us if you see any of these signs.    If there are no signs of infection, you may shower after the bandage comes off in 3 days. If you take a tub bath, keep the wound dry.    No soaking the incision (swimming pool, bathtub, hot tub) for 2 weeks.    You may have mild to medium pain for 3 to 5 days. Take Acetaminophen (Tylenol) or Ibuprofen (Advil) for the pain. If the pain persists or is severe, call us.    Care of Groin Puncture Site:      For the first 24 hrs - check the puncture site every 1-2 hours while awake.    For the first 2 days, when you cough, sneeze, laugh or move your bowels, hold your hand over the puncture site and press firmly.    Remove the bandaid after 24 hours. If there is minor oozing, apply another bandaid and remove it after 12 hours.    It is normal to have a small bruise or pea size lump at the site.    Do NOT take a bath, or use a hot tub or pool for at least 3 days. Do NOT scrub the site. Do  not use lotion or powder near the puncture site.    Activity    Chest:    For at least 2 weeks: Do not raise your elbow above your shoulder. You can begin to use your arm as it feels comfortable to you.    Do not use arm on implant side to lift more than 10 pounds for 2 weeks.    In 6 to 8 weeks: You may begin to golf, play tennis, swim and do similar activities.    No driving for one day & limit to necessary driving for one week. Please talk to your doctor for specific recommendations.    Groin:     For 2 days:    No stooping or squatting    Do NOT do any heavy activity such as exercise, lifting, or straining.     No housework, yard work or any activity that make you sweat    Do NOT lift more than 10 pounds    Bleeding:    Chest:    If you start bleeding from the incision site, sit down and press firmly on the site for 10 minutes.     Once bleeding stops, call [facility] as soon as you can.    Groin:     If you start bleeding from the site in your groin, lie down flat and press firmly on the site for 10 minutes.     Once bleeding stops, lay flat for 2 hours.     Call [facility] as soon as you can.       Call 911 right away if you have heavy bleeding or bleeding that does not stop.      Medicines:      Take your medications, including blood thinners, unless your provider tells you not to.    If you have stopped any medicines, check with your provider about when to restart them.    If you have pain or shortness of breath, you may take Advil (ibuprofen) or Tylenol (acetaminophen).    Follow Up Appointments:      Follow up with Device Clinic at [facility] of patient preference in 7-10 days.    Call the clinic if:      You have increased pain or a large or growing hard lump around the site.    The site is red, swollen, hot or tender.    Blood or fluid is draining from the site.    You have chills or a fever greater than 101 F (38 C).    Your leg feels numb, cool or changes color.    Increased pain in  the chest and/or groin.    Increased shortness of breath    Chest pain not relieved by Tylenol or Advil    New pain in the back or belly that you cannot control with Tylenol.    Recurrent irregular or fast heart rate lasting over 2 hours.    Any questions or concerns.    Heart rhythms:    You may have some irregular heartbeats. These feel very strong. They may make you feel that the fast heart rhythm is going to start again.  Give it time. The irregular beats should occur less often.      Telling others about your device:      Before you leave the hospital, you will receive a temporary ID card. A permanent card will be mailed to you about 6 to 8 weeks later. Always carry the ID card with you. It has important details about your device.    You may also get a Medical Alert bracelet or tag that says you have a pacemaker or a defibrillator (ICD). Go to [email or web address].     Always tell doctors, dentists and other care providers that you have a device implanted in you.    Let us know before you plan any surgeries. Your care team must take special steps to keep you safe during certain procedures. These steps will depend on the type of device you have. Your provider will need to see your ID card. They may need to call us for instructions.    Device Safety:      Please refer to device  s booklet for further information.    [facility]:    [phone] UMP (7 days a week)

## 2019-07-07 NOTE — PLAN OF CARE
Physical Therapy Discharge Summary    Reason for therapy discharge:    Discharged to transitional care facility.    Progress towards therapy goal(s). See goals on Care Plan in [facility] electronic health record for goal details.  Goals not met.  Barriers to achieving goals:   discharge from facility and Disch day after eval prior to PT appointment .    Therapy recommendation(s):    Continued therapy is recommended.  Rationale/Recommendations:  Pt is below baseline level of function, at increased risk for falls, recommend continued PT at TCU to increase strength, activity tolerance, and independence with mobility prior to return home..

## 2019-07-07 NOTE — PLAN OF CARE
VSS. Tele- paced with underlying afib. Port de-accesed from IV team. Dressed and sent to [facility] via  with family. Pt states that she has all of her belongings. PPM site CDI. AVS sent with daughter and packet sent with patient to TCU.

## 2019-07-08 ENCOUNTER — DOCUMENTATION ONLY (OUTPATIENT)
Dept: OTHER | Facility: CLINIC | Age: 84
End: 2019-07-08

## 2019-07-08 ENCOUNTER — PATIENT OUTREACH (OUTPATIENT)
Dept: CARE COORDINATION | Facility: CLINIC | Age: 84
End: 2019-07-08

## 2019-07-08 ENCOUNTER — NURSING HOME VISIT (OUTPATIENT)
Dept: GERIATRICS | Facility: CLINIC | Age: 84
End: 2019-07-08
Payer: MEDICARE

## 2019-07-08 VITALS
HEIGHT: 63 IN | HEART RATE: 80 BPM | DIASTOLIC BLOOD PRESSURE: 74 MMHG | BODY MASS INDEX: 23.57 KG/M2 | SYSTOLIC BLOOD PRESSURE: 126 MMHG | RESPIRATION RATE: 16 BRPM | WEIGHT: 133 LBS | OXYGEN SATURATION: 96 % | TEMPERATURE: 96.5 F

## 2019-07-08 DIAGNOSIS — R41.89 COGNITIVE IMPAIRMENT: ICD-10-CM

## 2019-07-08 DIAGNOSIS — R53.81 PHYSICAL DECONDITIONING: ICD-10-CM

## 2019-07-08 DIAGNOSIS — G93.41 METABOLIC ENCEPHALOPATHY: Primary | ICD-10-CM

## 2019-07-08 DIAGNOSIS — C90.00 MULTIPLE MYELOMA NOT HAVING ACHIEVED REMISSION (H): ICD-10-CM

## 2019-07-08 DIAGNOSIS — Z98.890 S/P ABLATION OF ATRIAL FIBRILLATION: ICD-10-CM

## 2019-07-08 DIAGNOSIS — Z86.79 S/P ABLATION OF ATRIAL FIBRILLATION: ICD-10-CM

## 2019-07-08 DIAGNOSIS — I48.91 ATRIAL FIBRILLATION WITH RAPID VENTRICULAR RESPONSE (H): Primary | ICD-10-CM

## 2019-07-08 DIAGNOSIS — Z71.89 ADVANCED DIRECTIVES, COUNSELING/DISCUSSION: ICD-10-CM

## 2019-07-08 DIAGNOSIS — I50.33 ACUTE ON CHRONIC DIASTOLIC CONGESTIVE HEART FAILURE (H): ICD-10-CM

## 2019-07-08 DIAGNOSIS — I10 BENIGN ESSENTIAL HYPERTENSION: ICD-10-CM

## 2019-07-08 DIAGNOSIS — Z95.0 S/P PLACEMENT OF CARDIAC PACEMAKER: ICD-10-CM

## 2019-07-08 DIAGNOSIS — J90 PLEURAL EFFUSION: ICD-10-CM

## 2019-07-08 DIAGNOSIS — I27.20 PULMONARY HYPERTENSION (H): ICD-10-CM

## 2019-07-08 LAB
KAPPA LC FREE SER-MCNC: 1.24 MG/DL (ref 0.33–1.94)
KAPPA LC FREE/LAMBDA FREE SER NEPH: ABNORMAL {RATIO} (ref 0.26–1.65)
LAMBDA LC FREE SERPL-MCNC: <0.16 MG/DL (ref 0.57–2.63)

## 2019-07-08 PROCEDURE — 99310 SBSQ NF CARE HIGH MDM 45: CPT | Performed by: NURSE PRACTITIONER

## 2019-07-08 ASSESSMENT — MIFFLIN-ST. JEOR: SCORE: 1012.41

## 2019-07-08 NOTE — PROGRESS NOTES
[facility]  PRIMARY CARE PROVIDER AND CLINIC:  Addy Frias MD, [address] / [place] [address]  Chief Complaint   Patient presents with     Hospital F/U     [facility] Record Number:  [id number]  Place of Service where encounter took place:  [facility] [[id number]]    Amira Arreola  is a 86 year old  (7/17/1932), admitted to the above facility from  [facility]. Hospital stay 7/3/2019 through 7/7/2019..  Admitted to this facility for  rehab, medical management and nursing care.    HPI:    HPI information obtained from: facility chart records, facility staff, patient report, [facility] chart review and family/first contact Yesi Bui report.   Brief Summary of Hospital Course:   She has a complex medical history significant for multiple myeloma, chronic afib on coumadin, diastolic CHF, pulmonary hypertension, SVT, cognitive impairment, and was hospitalized from Cardiology clinic with hypotension and altered mental status. She was in afib with RVR and initially required diltiazem drip. She underwent ablation with pacemaker placement 7/5/2019. Metoprolol and diltiazem were discontinued. Lasix was held, then resumed at lower dose. CXR with left pleural effusion and she underwent left thoracentesis  7/5/2019 with 250 ml drained. Palliative Care consulted for goals of care and code status was changed to DNR/DNI. Chemotherapy is on hold due to decline in both cognition and functional status.   Calcium level was elevated at 11 on admission,  improved with IVF. Bisphosphonate therapy is on hold.       Updates on Status Since Skilled nursing Admission:     Atrial fibrillation with rapid ventricular response (H)-INR 1.6 today, had coumadin 2 mg last night. Coumadin was held for procedures, then  resumed at hospital discharge. Usual home dose is 4 mg on Tues and Sat, 2 mg the other days of the week. HR: 86-94  Reports fatigue, but feeling better.  Mild pain at pacemaker site. Wants to go to a concert at the facility this morning. Fair appetite.   S/P ablation of atrial fibrillation  S/P placement of cardiac pacemaker  Acute on chronic diastolic congestive heart failure (H)-short of breath with exertion, no cough or chest pain. No hypoxia.   Pulmonary hypertension (H)  Pleural effusion  Multiple myeloma not having achieved remission (H)-followed by Dr Roberts. Reports upper back pain is controlled with percocet.   Benign essential hypertension-BPs: 118/77, 126/74,106/65, 122/75  Cognitive impairment-fair historian. Pleasant and talkative. Daughter reports her cognition has improved since hospital admission, but  family has noticed a gradual decline in recent months.  She is more confused in the evening and at night. Son Dannie Arreola lives with patient and her  and assists with cares.   SLUMS 6/30 during tcu stay 4/2019.   Physical deconditioning-standing for transfers and toileting. Requires assist of 1 with cares.     CODE STATUS/ADVANCE DIRECTIVES DISCUSSION:   DNR / DNI  Patient's living condition: lives with family, spouse;child(ezra), adult   ALLERGIES: Blood transfusion related (informational only) and Penicillin [penicillins]  PAST MEDICAL HISTORY:  has a past medical history of Abnormal CXR (2018), Acute diastolic heart failure (H) (03/22/2019), Ascending aorta dilatation (H) (04/2016), Cancer, metastatic to bone (H), Colonic polyp (2008), Compression fracture (2016), Dry eyes, Elevated MCV (2015), HTN (hypertension) (2000), Lung nodule (08/2018), Menorrhagia (2002), MGUS (monoclonal gammopathy of unknown significance) (2015), Multiple myeloma (H) (2016), OAB (overactive bladder) (2013), Osteoporosis, Palpitations (4/16), Paroxysmal atrial fibrillation (H) (4/16), Pulmonary hypertension (H) (03/2019), Sciatica of left side (12/13), Shingles (2004), SVT (supraventricular tachycardia) (H) (4/16), and Thrombocytopenia (H) (2014). She also has no past  medical history of PONV (postoperative nausea and vomiting).  PAST SURGICAL HISTORY:   has a past surgical history that includes hysteroscopy and d and c (); right ankle surgery (); cataract iol, rt/lt (); left anle replacement (); Colonoscopy (2013);  section (1965, 1966); EXCISE EXOSTOSIS TIBIA / FIBULA (2014); Bone marrow biopsy, bone specimen, needle/trocar (N/A, 2016); EP Pacemaker (N/A, 2019); and EP Ablation AV Node (N/A, 2019).  FAMILY HISTORY: family history includes C.A.D. in her mother; Cerebrovascular Disease in her brother; Family History Negative in her brother, sister, and sister; Heart Disease in her father.  SOCIAL HISTORY:   reports that she has never smoked. She has never used smokeless tobacco. She reports that she does not drink alcohol or use drugs.    Post Discharge Medication Reconciliation Status: discharge medications reconciled and changed, per note/orders (see AVS)    Current Outpatient Medications   Medication Sig Dispense Refill     acetaminophen (TYLENOL) 500 MG tablet Take 500 mg by mouth every 6 hours as needed for mild pain        acyclovir (ZOVIRAX) 400 MG tablet Take 1 tablet (400 mg) by mouth 2 times daily 60 tablet 3     Carboxymethylcellulose Sod PF (REFRESH PLUS) 0.5 % SOLN ophthalmic solution 1 drop 4 times daily as needed for dry eyes       dexamethasone (DECADRON) 4 MG tablet Take 20mg (5 tablets) by mouth every week. 28 tablet 3     furosemide (LASIX) 20 MG tablet Take 1 tablet (20 mg) by mouth daily       latanoprost (XALATAN) 0.005 % ophthalmic solution Place 1 drop into the right eye At Bedtime       lidocaine-prilocaine (EMLA) cream Apply to port site 1 hour prior to access 30 g 1     Multiple Vitamins-Minerals (DAILY MULTIVITAMIN) CAPS Take 1 tablet by mouth daily        oxyCODONE-acetaminophen (PERCOCET) 5-325 MG tablet Take 1 tablet by mouth every 4 hours as needed for breakthrough pain 30 tablet 0     polyethylene  "glycol (MIRALAX/GLYCOLAX) powder Take 17 g by mouth daily as needed for constipation        potassium chloride (KLOR-CON) 20 MEQ packet Take 20 mEq by mouth daily 60 packet 1     prochlorperazine (COMPAZINE) 10 MG tablet Take 1 tablet (10 mg) by mouth every 6 hours as needed for nausea or vomiting 30 tablet 1     SIMBRINZA 1-0.2 % ophthalmic suspension Place 1 drop into the right eye 2 times daily 1 drop AM and PM  2     Sodium Fluoride (SF 5000 PLUS) 1.1 % CREA Apply to affected area 3 times daily       warfarin (COUMADIN) 4 MG tablet Take one tablet (4 mg) by mouth on Tuesdays and Saturdays; take one-half tablet ( 2 mg) on all other days of the week.         ROS:  10 point ROS of systems including Constitutional, Eyes, Respiratory, Cardiovascular, Gastroenterology, Genitourinary, Integumentary, Musculoskeletal, Psychiatric were all negative except for pertinent positives noted in my HPI.    Vitals:  /74   Pulse 80   Temp 96.5  F (35.8  C)   Resp 16   Ht 1.6 m (5' 3\")   Wt 60.3 kg (133 lb)   SpO2 96%   BMI 23.56 kg/m    Exam:  GENERAL APPEARANCE:  Alert, in no distress  ENT:  Mouth and posterior oropharynx normal, moist mucous membranes, [place]  EYES:  EOM normal, conjunctiva and lids normal, PERRL  NECK:  No adenopathy,masses or thyromegaly  RESP:  respiratory effort and palpation of chest normal, no respiratory distress, diminished breath sounds left base, no crackles. Right side is clear  CV:  Palpation and auscultation of heart done , regular rate and rhythm, no murmur,  +2 pedal pulses, peripheral edema trace in both LE  ABDOMEN:  normal bowel sounds, soft, nontender, no hepatosplenomegaly or other masses  M/S:   wheelchair. LOPEZ with good strength. No joint inflammation  SKIN:  pacemaker dressing clean,dry, intact. No rashes or open areas  PSYCH:  oriented to self, place, situation, insight and judgement impaired, memory impaired , affect and mood normal    Lab/Diagnostic data:  Lab Results "   Component Value Date    WBC 7.9 07/06/2019     Lab Results   Component Value Date    RBC 3.30 07/06/2019     Lab Results   Component Value Date    HGB 10.5 07/06/2019     Lab Results   Component Value Date    HCT 31.8 07/06/2019     Lab Results   Component Value Date    MCV 96 07/06/2019     Lab Results   Component Value Date    MCH 31.8 07/06/2019     Lab Results   Component Value Date    MCHC 33.0 07/06/2019     Lab Results   Component Value Date    RDW 18.2 07/06/2019     Lab Results   Component Value Date     07/06/2019     Last Comprehensive Metabolic Panel:  Sodium   Date Value Ref Range Status   07/07/2019 142 133 - 144 mmol/L Final     Potassium   Date Value Ref Range Status   07/07/2019 3.8 3.4 - 5.3 mmol/L Final     Chloride   Date Value Ref Range Status   07/07/2019 109 94 - 109 mmol/L Final     Carbon Dioxide   Date Value Ref Range Status   07/07/2019 28 20 - 32 mmol/L Final     Anion Gap   Date Value Ref Range Status   07/07/2019 5 3 - 14 mmol/L Final     Glucose   Date Value Ref Range Status   07/07/2019 94 70 - 99 mg/dL Final     Urea Nitrogen   Date Value Ref Range Status   07/07/2019 21 7 - 30 mg/dL Final     Creatinine   Date Value Ref Range Status   07/07/2019 0.52 0.52 - 1.04 mg/dL Final     GFR Estimate   Date Value Ref Range Status   07/07/2019 86 >60 mL/min/[1.73_m2] Final     Comment:     Non  GFR Calc  Starting 12/18/2018, serum creatinine based estimated GFR (eGFR) will be   calculated using the Chronic Kidney Disease Epidemiology Collaboration   (CKD-EPI) equation.       Calcium   Date Value Ref Range Status   07/07/2019 8.4 (L) 8.5 - 10.1 mg/dL Final       ASSESSMENT / PLAN:  (I48.91) Atrial fibrillation with rapid ventricular response (H)  (primary encounter diagnosis)  (Z98.890,  Z86.79) S/P ablation of atrial fibrillation  (Z95.0) S/P placement of cardiac pacemaker  Comment: rate controlled. INR subtherapeutic after coumadin was held for inpatient procedures.   Dressing intact at pacemaker site.   Plan: coumadin 2-4 mg daily. INR 7/11/2019. Follow up [facility] to be scheduled.     (I50.33) Acute on chronic diastolic congestive heart failure (H)  (I27.20) Pulmonary hypertension (H)  (J90) Pleural effusion  Comment: volume status improved. S/p left thoracentesis 7/5/2019  Plan: continue lasix. Daily weight. Hgb, BMP. Compression stockings during the day. Low sodium diet. [facility] follow up to be scheduled.     (C90.00) Multiple myeloma not having achieved remission (H)  Comment: metastatic disease. Back pain is controlled. Chemo currently on hold due to poor functional status  Plan: continue decadron, percocet. Follow up with Dr Roberts 8/7/2019.     (I10) Benign essential hypertension  Comment: hypotension improved.   Plan: continue lasix for CHF. Monitor VS    (R41.89) Cognitive impairment  Comment: cognition has improved from hospital admission, but has had a progressive decline in recent months. Appears to have moderate deficits.   Plan: cognitive testing per therapies. Supportive care.     (R53.81) Physical deconditioning  Comment: due to acute illness, hospitalization, multiple comorbidities   Plan: PHYSICAL THERAPY/OT. Disposition is unclear at this time-daughter hopes she will improve and they will be able to manage her care at home, but understands this may no longer be feasible.     (Z71.89) Advanced directives, counseling/discussion  Comment: daughter confirms that code status has been changed to DNR/DNI, but family is not ready for comfort care. They may consider resuming chemo if her status improves and Dr Roberts feels it's appropriate.   Plan: POLST completed.         Total time spent with patient visit at the skilled nursing facility was 40 mins including patient visit, review of past records and phone call to patient contact. Greater than 50% of total time spent with counseling and coordinating care due to establishing care at the facility,  clarifying  hospital discharge orders, addressing medical issues as above, discussing goals of care and advanced directive with daughter, coordinating care with facility staff.     Electronically signed by:  DAYSI English CNP

## 2019-07-08 NOTE — PROGRESS NOTES
Clinic Care Coordination Contact  Care Coordination Transition Communication    Referral Source: IP Report    Clinical Data: Patient was hospitalized at [facility]  from 7/3/2019 to 7/7/2019 with diagnosis of   Discharge Diagnoses       Metabolic encephalopathy, resolved    Hypercalcemia    Chronic atrial fibrillation with RVR    HTN    Chronic diastolic heart failure    Severe pulmonary HTN    Hx of SVT    Metastatic Multiple myeloma     Severe malnutrition  Deconditioning.     Transition to Facility:              [facility]: [facility]             Contact name and phone number/fax: Libby MEYERS [phone]    Plan: RN/SW Care Coordinator will await notification from facility staff informing RN/SW Care Coordinator of patient's discharge plans/needs. RN/SW Care Coordinator will review chart and outreach to facility staff every 4 weeks and as needed.     Anjali Jackson RN, Care Coordinator   [facility] -Care Coordination  [facility] and [facility]   [phone]

## 2019-07-08 NOTE — LETTER
7/8/2019        RE: Amira Arerola  [address]  [place] [address]        [facility]  PRIMARY CARE PROVIDER AND CLINIC:  Addy Frias MD, [address] / [place] [address]  Chief Complaint   Patient presents with     Hospital F/U     [facility] Record Number:  [id number]  Place of Service where encounter took place:  [facility] [[id number]]    Amira Arreola  is a 86 year old  (7/17/1932), admitted to the above facility from  [facility]. Hospital stay 7/3/2019 through 7/7/2019..  Admitted to this facility for  rehab, medical management and nursing care.    HPI:    HPI information obtained from: facility chart records, facility staff, patient report, [facility] chart review and family/first contact Yesi Bui report.   Brief Summary of Hospital Course:   She has a complex medical history significant for multiple myeloma, chronic afib on coumadin, diastolic CHF, pulmonary hypertension, SVT, cognitive impairment, and was hospitalized from Cardiology clinic with hypotension and altered mental status. She was in afib with RVR and initially required diltiazem drip. She underwent ablation with pacemaker placement 7/5/2019. Metoprolol and diltiazem were discontinued. Lasix was held, then resumed at lower dose. CXR with left pleural effusion and she underwent left thoracentesis  7/5/2019 with 250 ml drained. Palliative Care consulted for goals of care and code status was changed to DNR/DNI. Chemotherapy is on hold due to decline in both cognition and functional status.   Calcium level was elevated at 11 on admission,  improved with IVF. Bisphosphonate therapy is on hold.       Updates on Status Since Skilled nursing Admission:     Atrial fibrillation with rapid ventricular response (H)-INR 1.6 today, had coumadin 2 mg last night. Coumadin was held for procedures, then  resumed at hospital discharge. Usual home dose is 4 mg on  Tues and Sat, 2 mg the other days of the week. HR: 86-94  Reports fatigue, but feeling better. Mild pain at pacemaker site. Wants to go to a concert at the facility this morning. Fair appetite.   S/P ablation of atrial fibrillation  S/P placement of cardiac pacemaker  Acute on chronic diastolic congestive heart failure (H)-short of breath with exertion, no cough or chest pain. No hypoxia.   Pulmonary hypertension (H)  Pleural effusion  Multiple myeloma not having achieved remission (H)-followed by Dr Roberts. Reports upper back pain is controlled with percocet.   Benign essential hypertension-BPs: 118/77, 126/74,106/65, 122/75  Cognitive impairment-fair historian. Pleasant and talkative. Daughter reports her cognition has improved since hospital admission, but  family has noticed a gradual decline in recent months.  She is more confused in the evening and at night. Son Dannie Arreola lives with patient and her  and assists with cares.   SLUMS 6/30 during tcu stay 4/2019.   Physical deconditioning-standing for transfers and toileting. Requires assist of 1 with cares.     CODE STATUS/ADVANCE DIRECTIVES DISCUSSION:   DNR / DNI  Patient's living condition: lives with family, spouse;child(ezra), adult   ALLERGIES: Blood transfusion related (informational only) and Penicillin [penicillins]  PAST MEDICAL HISTORY:  has a past medical history of Abnormal CXR (2018), Acute diastolic heart failure (H) (03/22/2019), Ascending aorta dilatation (H) (04/2016), Cancer, metastatic to bone (H), Colonic polyp (2008), Compression fracture (2016), Dry eyes, Elevated MCV (2015), HTN (hypertension) (2000), Lung nodule (08/2018), Menorrhagia (2002), MGUS (monoclonal gammopathy of unknown significance) (2015), Multiple myeloma (H) (2016), OAB (overactive bladder) (2013), Osteoporosis, Palpitations (4/16), Paroxysmal atrial fibrillation (H) (4/16), Pulmonary hypertension (H) (03/2019), Sciatica of left side (12/13), Shingles (2004), SVT  (supraventricular tachycardia) (H) (), and Thrombocytopenia (H) (). She also has no past medical history of PONV (postoperative nausea and vomiting).  PAST SURGICAL HISTORY:   has a past surgical history that includes hysteroscopy and d and c (); right ankle surgery (); cataract iol, rt/lt (); left anle replacement (); Colonoscopy (2013);  section (1965, 1966); EXCISE EXOSTOSIS TIBIA / FIBULA (2014); Bone marrow biopsy, bone specimen, needle/trocar (N/A, 2016); EP Pacemaker (N/A, 2019); and EP Ablation AV Node (N/A, 2019).  FAMILY HISTORY: family history includes C.A.D. in her mother; Cerebrovascular Disease in her brother; Family History Negative in her brother, sister, and sister; Heart Disease in her father.  SOCIAL HISTORY:   reports that she has never smoked. She has never used smokeless tobacco. She reports that she does not drink alcohol or use drugs.    Post Discharge Medication Reconciliation Status: discharge medications reconciled and changed, per note/orders (see AVS)    Current Outpatient Medications   Medication Sig Dispense Refill     acetaminophen (TYLENOL) 500 MG tablet Take 500 mg by mouth every 6 hours as needed for mild pain        acyclovir (ZOVIRAX) 400 MG tablet Take 1 tablet (400 mg) by mouth 2 times daily 60 tablet 3     Carboxymethylcellulose Sod PF (REFRESH PLUS) 0.5 % SOLN ophthalmic solution 1 drop 4 times daily as needed for dry eyes       dexamethasone (DECADRON) 4 MG tablet Take 20mg (5 tablets) by mouth every week. 28 tablet 3     furosemide (LASIX) 20 MG tablet Take 1 tablet (20 mg) by mouth daily       latanoprost (XALATAN) 0.005 % ophthalmic solution Place 1 drop into the right eye At Bedtime       lidocaine-prilocaine (EMLA) cream Apply to port site 1 hour prior to access 30 g 1     Multiple Vitamins-Minerals (DAILY MULTIVITAMIN) CAPS Take 1 tablet by mouth daily        oxyCODONE-acetaminophen (PERCOCET) 5-325 MG tablet Take  "1 tablet by mouth every 4 hours as needed for breakthrough pain 30 tablet 0     polyethylene glycol (MIRALAX/GLYCOLAX) powder Take 17 g by mouth daily as needed for constipation        potassium chloride (KLOR-CON) 20 MEQ packet Take 20 mEq by mouth daily 60 packet 1     prochlorperazine (COMPAZINE) 10 MG tablet Take 1 tablet (10 mg) by mouth every 6 hours as needed for nausea or vomiting 30 tablet 1     SIMBRINZA 1-0.2 % ophthalmic suspension Place 1 drop into the right eye 2 times daily 1 drop AM and PM  2     Sodium Fluoride (SF 5000 PLUS) 1.1 % CREA Apply to affected area 3 times daily       warfarin (COUMADIN) 4 MG tablet Take one tablet (4 mg) by mouth on Tuesdays and Saturdays; take one-half tablet ( 2 mg) on all other days of the week.         ROS:  10 point ROS of systems including Constitutional, Eyes, Respiratory, Cardiovascular, Gastroenterology, Genitourinary, Integumentary, Musculoskeletal, Psychiatric were all negative except for pertinent positives noted in my HPI.    Vitals:  /74   Pulse 80   Temp 96.5  F (35.8  C)   Resp 16   Ht 1.6 m (5' 3\")   Wt 60.3 kg (133 lb)   SpO2 96%   BMI 23.56 kg/m     Exam:  GENERAL APPEARANCE:  Alert, in no distress  ENT:  Mouth and posterior oropharynx normal, moist mucous membranes, [place]  EYES:  EOM normal, conjunctiva and lids normal, PERRL  NECK:  No adenopathy,masses or thyromegaly  RESP:  respiratory effort and palpation of chest normal, no respiratory distress, diminished breath sounds left base, no crackles. Right side is clear  CV:  Palpation and auscultation of heart done , regular rate and rhythm, no murmur,  +2 pedal pulses, peripheral edema trace in both LE  ABDOMEN:  normal bowel sounds, soft, nontender, no hepatosplenomegaly or other masses  M/S:   wheelchair. LOPEZ with good strength. No joint inflammation  SKIN:  pacemaker dressing clean,dry, intact. No rashes or open areas  PSYCH:  oriented to self, place, situation, insight and judgement " impaired, memory impaired , affect and mood normal    Lab/Diagnostic data:  Lab Results   Component Value Date    WBC 7.9 07/06/2019     Lab Results   Component Value Date    RBC 3.30 07/06/2019     Lab Results   Component Value Date    HGB 10.5 07/06/2019     Lab Results   Component Value Date    HCT 31.8 07/06/2019     Lab Results   Component Value Date    MCV 96 07/06/2019     Lab Results   Component Value Date    MCH 31.8 07/06/2019     Lab Results   Component Value Date    MCHC 33.0 07/06/2019     Lab Results   Component Value Date    RDW 18.2 07/06/2019     Lab Results   Component Value Date     07/06/2019     Last Comprehensive Metabolic Panel:  Sodium   Date Value Ref Range Status   07/07/2019 142 133 - 144 mmol/L Final     Potassium   Date Value Ref Range Status   07/07/2019 3.8 3.4 - 5.3 mmol/L Final     Chloride   Date Value Ref Range Status   07/07/2019 109 94 - 109 mmol/L Final     Carbon Dioxide   Date Value Ref Range Status   07/07/2019 28 20 - 32 mmol/L Final     Anion Gap   Date Value Ref Range Status   07/07/2019 5 3 - 14 mmol/L Final     Glucose   Date Value Ref Range Status   07/07/2019 94 70 - 99 mg/dL Final     Urea Nitrogen   Date Value Ref Range Status   07/07/2019 21 7 - 30 mg/dL Final     Creatinine   Date Value Ref Range Status   07/07/2019 0.52 0.52 - 1.04 mg/dL Final     GFR Estimate   Date Value Ref Range Status   07/07/2019 86 >60 mL/min/[1.73_m2] Final     Comment:     Non  GFR Calc  Starting 12/18/2018, serum creatinine based estimated GFR (eGFR) will be   calculated using the Chronic Kidney Disease Epidemiology Collaboration   (CKD-EPI) equation.       Calcium   Date Value Ref Range Status   07/07/2019 8.4 (L) 8.5 - 10.1 mg/dL Final       ASSESSMENT / PLAN:  (I48.91) Atrial fibrillation with rapid ventricular response (H)  (primary encounter diagnosis)  (Z98.890,  Z86.79) S/P ablation of atrial fibrillation  (Z95.0) S/P placement of cardiac pacemaker  Comment:  rate controlled. INR subtherapeutic after coumadin was held for inpatient procedures.  Dressing intact at pacemaker site.   Plan: coumadin 2-4 mg daily. INR 7/11/2019. Follow up Device Clinic to be scheduled.     (I50.33) Acute on chronic diastolic congestive heart failure (H)  (I27.20) Pulmonary hypertension (H)  (J90) Pleural effusion  Comment: volume status improved. S/p left thoracentesis 7/5/2019  Plan: continue lasix. Daily weight. Hgb, BMP. Compression stockings during the day. Low sodium diet. [facility] follow up to be scheduled.     (C90.00) Multiple myeloma not having achieved remission (H)  Comment: metastatic disease. Back pain is controlled. Chemo currently on hold due to poor functional status  Plan: continue decadron, percocet. Follow up with Dr Roberts 8/7/2019.     (I10) Benign essential hypertension  Comment: hypotension improved.   Plan: continue lasix for CHF. Monitor VS    (R41.89) Cognitive impairment  Comment: cognition has improved from hospital admission, but has had a progressive decline in recent months. Appears to have moderate deficits.   Plan: cognitive testing per therapies. Supportive care.     (R53.81) Physical deconditioning  Comment: due to acute illness, hospitalization, multiple comorbidities   Plan: PHYSICAL THERAPY/OT. Disposition is unclear at this time-daughter hopes she will improve and they will be able to manage her care at home, but understands this may no longer be feasible.     (Z71.89) Advanced directives, counseling/discussion  Comment: daughter confirms that code status has been changed to DNR/DNI, but family is not ready for comfort care. They may consider resuming chemo if her status improves and Dr Roberts feels it's appropriate.   Plan: POLST completed.         Total time spent with patient visit at the skilled nursing facility was 40 mins including patient visit, review of past records and phone call to patient contact. Greater than 50% of total time spent with  counseling and coordinating care due to establishing care at the facility,  clarifying hospital discharge orders, addressing medical issues as above, discussing goals of care and advanced directive with daughter, coordinating care with facility staff.     Electronically signed by:  DAYSI English CNP                         Sincerely,        DAYSI English CNP

## 2019-07-09 ENCOUNTER — NURSING HOME VISIT (OUTPATIENT)
Dept: GERIATRICS | Facility: CLINIC | Age: 84
End: 2019-07-09
Payer: MEDICARE

## 2019-07-09 VITALS
HEIGHT: 63 IN | SYSTOLIC BLOOD PRESSURE: 122 MMHG | WEIGHT: 133.1 LBS | HEART RATE: 82 BPM | BODY MASS INDEX: 23.58 KG/M2 | DIASTOLIC BLOOD PRESSURE: 77 MMHG | TEMPERATURE: 98.7 F | RESPIRATION RATE: 17 BRPM | OXYGEN SATURATION: 98 %

## 2019-07-09 DIAGNOSIS — I48.91 ATRIAL FIBRILLATION WITH RAPID VENTRICULAR RESPONSE (H): Primary | ICD-10-CM

## 2019-07-09 DIAGNOSIS — Z86.79 S/P ABLATION OF ATRIAL FIBRILLATION: ICD-10-CM

## 2019-07-09 DIAGNOSIS — J90 PLEURAL EFFUSION: ICD-10-CM

## 2019-07-09 DIAGNOSIS — Z98.890 S/P ABLATION OF ATRIAL FIBRILLATION: ICD-10-CM

## 2019-07-09 DIAGNOSIS — Z95.0 S/P PLACEMENT OF CARDIAC PACEMAKER: ICD-10-CM

## 2019-07-09 DIAGNOSIS — C90.00 MULTIPLE MYELOMA NOT HAVING ACHIEVED REMISSION (H): ICD-10-CM

## 2019-07-09 DIAGNOSIS — I27.20 PULMONARY HYPERTENSION (H): ICD-10-CM

## 2019-07-09 DIAGNOSIS — R41.89 COGNITIVE IMPAIRMENT: ICD-10-CM

## 2019-07-09 DIAGNOSIS — I50.33 ACUTE ON CHRONIC DIASTOLIC CONGESTIVE HEART FAILURE (H): ICD-10-CM

## 2019-07-09 DIAGNOSIS — R53.81 PHYSICAL DECONDITIONING: ICD-10-CM

## 2019-07-09 LAB
BACTERIA SPEC CULT: NO GROWTH
BACTERIA SPEC CULT: NO GROWTH
COPATH REPORT: NORMAL
Lab: NORMAL
Lab: NORMAL
SPECIMEN SOURCE: NORMAL
SPECIMEN SOURCE: NORMAL

## 2019-07-09 PROCEDURE — 99306 1ST NF CARE HIGH MDM 50: CPT | Performed by: INTERNAL MEDICINE

## 2019-07-09 RX ORDER — ALBUTEROL SULFATE 90 UG/1
1-2 AEROSOL, METERED RESPIRATORY (INHALATION)
Status: CANCELLED
Start: 2019-07-09

## 2019-07-09 RX ORDER — SODIUM CHLORIDE 9 MG/ML
1000 INJECTION, SOLUTION INTRAVENOUS CONTINUOUS PRN
Status: CANCELLED
Start: 2019-07-09

## 2019-07-09 RX ORDER — METHYLPREDNISOLONE SODIUM SUCCINATE 125 MG/2ML
125 INJECTION, POWDER, LYOPHILIZED, FOR SOLUTION INTRAMUSCULAR; INTRAVENOUS
Status: CANCELLED
Start: 2019-07-09

## 2019-07-09 RX ORDER — EPINEPHRINE 1 MG/ML
0.3 INJECTION, SOLUTION INTRAMUSCULAR; SUBCUTANEOUS EVERY 5 MIN PRN
Status: CANCELLED | OUTPATIENT
Start: 2019-07-09

## 2019-07-09 RX ORDER — ACETAMINOPHEN 325 MG/1
650 TABLET ORAL ONCE
Status: CANCELLED | OUTPATIENT
Start: 2019-07-09

## 2019-07-09 RX ORDER — DIPHENHYDRAMINE HYDROCHLORIDE 50 MG/ML
50 INJECTION INTRAMUSCULAR; INTRAVENOUS
Status: CANCELLED
Start: 2019-07-09

## 2019-07-09 RX ORDER — HEPARIN SODIUM (PORCINE) LOCK FLUSH IV SOLN 100 UNIT/ML 100 UNIT/ML
5 SOLUTION INTRAVENOUS EVERY 8 HOURS
Status: CANCELLED
Start: 2019-07-09

## 2019-07-09 RX ORDER — MEPERIDINE HYDROCHLORIDE 25 MG/ML
25 INJECTION INTRAMUSCULAR; INTRAVENOUS; SUBCUTANEOUS EVERY 30 MIN PRN
Status: CANCELLED | OUTPATIENT
Start: 2019-07-09

## 2019-07-09 RX ORDER — LORAZEPAM 2 MG/ML
0.5 INJECTION INTRAMUSCULAR EVERY 4 HOURS PRN
Status: CANCELLED
Start: 2019-07-09

## 2019-07-09 RX ORDER — DIPHENHYDRAMINE HCL 25 MG
50 CAPSULE ORAL ONCE
Status: CANCELLED | OUTPATIENT
Start: 2019-07-09

## 2019-07-09 RX ORDER — ALBUTEROL SULFATE 0.83 MG/ML
2.5 SOLUTION RESPIRATORY (INHALATION)
Status: CANCELLED | OUTPATIENT
Start: 2019-07-09

## 2019-07-09 RX ORDER — EPINEPHRINE 0.3 MG/.3ML
0.3 INJECTION SUBCUTANEOUS EVERY 5 MIN PRN
Status: CANCELLED | OUTPATIENT
Start: 2019-07-09

## 2019-07-09 RX ORDER — ZOLEDRONIC ACID 0.04 MG/ML
4 INJECTION, SOLUTION INTRAVENOUS ONCE
Status: CANCELLED | OUTPATIENT
Start: 2019-07-09

## 2019-07-09 ASSESSMENT — MIFFLIN-ST. JEOR: SCORE: 1012.87

## 2019-07-09 NOTE — PROGRESS NOTES
Amira Arreola is a 86 year old female seen July 9, 2019 [facility] where she was admitted this week after [facility] hospitalization for atrial fib with RVR, initially tx'd with a diltiazem drip.    She was seen by Cardiology and underwent ablation and pacemaker placement on 7/5    Metoprolol and diltiazem discontinued    She also had a left thoracentesis for pleural effusion, with 250 mls returned.     Today she is seen on the unit, up to WC.   Reports feeling okay, continued SALAZAR.   No pain at pacemaker site.  No palpitations or CP   Has been working with therapies, with improvement to standing tolerance of 4 minutes while working on a [employer] puzzle.       Pt also hospitalized in April with atrial fib with RVR and CHFpEF.     She presented with dyspnea, LE edema and palpitations, was started on diltiazem and metoprolol dose increased to improve VR control; has had lower bps since then with variable HRs.  Pt also has multiple myeloma with bone metastasis, diagnosed in 2016 by BM biopsy    She follows with Dr Roberts and was treated with daratumumab, Velcade, and dexamethasone every 2 weeks, but on hold due to low functional status and hospitalizations.     She has had recent weakness and falls, has a thoracic compression fracture with chronic back pain as well as pain from bony mets.     She has also had gradual cognitive decline, worse over past few months.   She lives with her  who is fairly frail, and son who assists with ADLs.       Past Medical History:   Diagnosis Date     Abnormal CXR 2018    then ct done and not significant     Acute diastolic heart failure (H) 03/22/2019    nl ef, 2+mr and tr with severe pulm htn     Ascending aorta dilatation (H) 04/2016    on echo, mild, fu 7/18 4.0, slightly larger     Cancer, metastatic to bone (H)     due to myeloma     Colonic polyp 2008    adenomatous, fu 2013 tics only     Compression fracture 2016    multiple areas of spine     Dry eyes      Elevated MCV 2015     b12 and folic acid nl     HTN (hypertension)     off meds for years     Lung nodule 2018    on ct, 4mm, ct done for fu abnl cxr     Menorrhagia     hysteroscopy and d and c done     MGUS (monoclonal gammopathy of unknown significance)     eval by Dr. Roberts     Multiple myeloma (H) 2016    dx  at [facility], bone lesions seen on mri      OAB (overactive bladder)     Dr. Grullon     Osteoporosis     fu done  and stable, went off meds then, fu done ; has had gyn fu and added evista 2013 by gyn     Palpitations     nl echo, mildly dilated asc aorta     Paroxysmal atrial fibrillation (H)     had palp and ziopatch showed it, echo nl lv fxn, mild mr and tr, added coum and toprol, toprol dose raised 16     Pulmonary hypertension (H) 2019    seen on echo     Sciatica of left side     Dr. Helen Ricardo      SVT (supraventricular tachycardia) (H)     on ziopatch     Thrombocytopenia (H)        Past Surgical History:   Procedure Laterality Date     BONE MARROW BIOPSY, BONE SPECIMEN, NEEDLE/TROCAR N/A 2016    Procedure: BIOPSY BONE MARROW;  Surgeon: Bryan Patel MD;  Location:  GI     CATARACT IOL, RT/LT        SECTION  1965, 1966     COLONOSCOPY  2013    Procedure: COLONOSCOPY;  COLONOSCOPY;  Surgeon: Steffany Rockwell MD;  Location:  GI     EP ABLATION AV NODE N/A 2019    Procedure: EP Ablation AV Node;  Surgeon: Lee Menchaca MD;  Location:  HEART CARDIAC CATH LAB     EP PACEMAKER N/A 2019    Procedure: EP Pacemaker;  Surgeon: Lee Menchaca MD;  Location:  HEART CARDIAC CATH LAB     EXCISE EXOSTOSIS TIBIA / FIBULA  2014    Procedure: EXCISE EXOSTOSIS TIBIA / FIBULA;  Surgeon: Naila Pichardo MD;  Location:  SD     hysteroscopy and d and c      due to bleeding     left anle replacement       right ankle surgery         Family History   Problem Relation Age of Onset      "Heart Disease Father      C.A.D. Mother      Cerebrovascular Disease Brother      Family History Negative Sister      Family History Negative Sister      Family History Negative Brother      Social History     Tobacco Use     Smoking status: Never Smoker     Smokeless tobacco: Never Used   Substance Use Topics     Alcohol use: No     Alcohol/week: 0.0 oz      SH:  Lives with her  Tom and son Dannie, house in [place].     She has 6 children.  Her daughter Yesi is a psychologist, lives next door.        Pt is a retired nurse.    Review Of Systems  Skin: negative   Eyes: impaired vision  Ears/Nose/Throat: hearing loss  Respiratory: + dyspnea on exertion  Cardiovascular: as above  Gastrointestinal: negative  Genitourinary: negative  Musculoskeletal: generalized weakness, assist with transfers and ambulation      Neurologic: MoCA 13/25, SLUMS 6/30       Psychiatric: negative  Hematologic/Lymphatic/Immunologic: anemia  Endocrine: negative      GENERAL APPEARANCE: alert and no distress, frail  /77   Pulse 82   Temp 98.7  F (37.1  C)   Resp 17   Ht 1.6 m (5' 3\")   Wt 60.4 kg (133 lb 1.6 oz)   SpO2 98%   BMI 23.58 kg/m     HEENT: normocephalic, no lesion or abnormalities  NECK: no adenopathy, no asymmetry, masses, or scars and thyroid normal to palpation  RESP: decreased BS left base, no rales or wheeze  CV: irregular rate and rhythm, normal S1 S2, 1/6 HSM  ABDOMEN:  soft, nontender, no HSM or masses and bowel sounds normal  MS: +kyphosis, 1+ LE edema  SKIN: no suspicious lesions or rashes; incision left upper chest wall with steri strips in place.   No edema or inflammation, dry.     NEURO: generalized weakness, limited history    No tremor or stiffness.      PSYCH: affect okay  LYMPHATICS: No cervical,  or supraclavicular nodes    Last Comprehensive Metabolic Panel:  Sodium   Date Value Ref Range Status   07/07/2019 142 133 - 144 mmol/L Final     Potassium   Date Value Ref Range Status   07/07/2019 3.8 " 3.4 - 5.3 mmol/L Final     Chloride   Date Value Ref Range Status   07/07/2019 109 94 - 109 mmol/L Final     Carbon Dioxide   Date Value Ref Range Status   07/07/2019 28 20 - 32 mmol/L Final     Anion Gap   Date Value Ref Range Status   07/07/2019 5 3 - 14 mmol/L Final     Glucose   Date Value Ref Range Status   07/07/2019 94 70 - 99 mg/dL Final     Urea Nitrogen   Date Value Ref Range Status   07/07/2019 21 7 - 30 mg/dL Final     Creatinine   Date Value Ref Range Status   07/07/2019 0.52 0.52 - 1.04 mg/dL Final     GFR Estimate   Date Value Ref Range Status   07/07/2019 86 >60 mL/min/[1.73_m2] Final     Comment:     Non  GFR Calc  Starting 12/18/2018, serum creatinine based estimated GFR (eGFR) will be   calculated using the Chronic Kidney Disease Epidemiology Collaboration   (CKD-EPI) equation.       Calcium   Date Value Ref Range Status   07/07/2019 8.4 (L) 8.5 - 10.1 mg/dL Final     Lab Results   Component Value Date    AST 22 07/03/2019     Lab Results   Component Value Date    ALT 32 07/03/2019      BILITOTAL 0.6 07/03/2019     Lab Results   Component Value Date    ALBUMIN 2.4 07/03/2019     Lab Results   Component Value Date    PROTTOTAL 5.7 07/05/2019      Lab Results   Component Value Date    ALKPHOS 52 07/03/2019     Lab Results   Component Value Date    WBC 7.9 07/06/2019      HGB 10.5 07/06/2019      MCV 96 07/06/2019       07/06/2019     TSH   Date Value Ref Range Status   03/22/2019 2.31 0.40 - 4.00 mU/L Final      ECHO 3/22/19 Interpretation Summary  The left ventricle is normal in size.  The visual ejection fraction is estimated at 55-60%.  No regional wall motion abnormalities noted.  There is moderate (2+) mitral regurgitation.  There is moderate (2+) tricuspid regurgitation.  Severe (>55mmHg) pulmonary hypertension is present.  The rhythm was rapid atrial fibrillation.      IMP/PLAN:    (I48.91) Atrial fibrillation with rapid ventricular response (H)  (primary encounter  diagnosis)  (Z98.890,  Z86.79) S/P ablation of atrial fibrillation  (Z95.0) S/P placement of cardiac pacemaker  Comment: CHADS-VASc score 5; pacemaker site looks good     Plan: warfarin per INR for stroke prophylaxis   Monitor incision, follow up in Device Clinic.       (I50.33) Acute on chronic diastolic congestive heart failure (H)  (I27.20) Pulmonary hypertension (H)  (J90) Pleural effusion  Comment: a little volume up today  Wt Readings from Last 5 Encounters:   07/09/19 60.4 kg (133 lb 1.6 oz)   07/08/19 60.3 kg (133 lb)   07/07/19 59.5 kg (131 lb 1.6 oz)   07/01/19 58.9 kg (129 lb 14.4 oz)   06/26/19 60.8 kg (134 lb)      Plan: furosemide 20 mg/day   She has decreased BS in her LLL c/w recurrence of effusion.   Consider increasing furosemide to bid, if more symptomatic will need to set up for repeat thoracentesis.       (R41.89) Cognitive impairment  Comment: scores as above and very low functional status, now c/w dx of dementia  Plan: Recommend higher level of care at discharge.    to work with family.       (C90.00) Multiple myeloma not having achieved remission (H)  Comment: CTX on hold due to low functional status and hospitalizations     Calcium 11  Plan: Continue to follow, she has an appointment with Dr Roberts on 8/7     Continue dexamethasone    (S22.000G) Closed compression fracture of thoracic vertebra with delayed healing, subsequent encounter  (G89.29) Other chronic pain  Comment: bone lesions secondary to multiple myeloma  Plan: prn acetaminophen, Percocet    (R53.81) Physical deconditioning  Comment: falls, generalized weakness   Plan: PHYSICAL THERAPY / OCCUPATIONAL THERAPY for strengthening, balance, gait, ADLs.     Formal cognitive testing per OCCUPATIONAL THERAPY; likely needs higher level of support at discharge.       Advanced directives: pt was seen by Palliative Care while hospitalized, and is DNR/DNI     Edith Ash MD

## 2019-07-09 NOTE — LETTER
7/9/2019        RE: Amira Arreola  [address]  [facility] [address]        Amira Arreola is a 86 year old female seen July 9, 2019 [facility] where she was admitted this week after [facility] hospitalization for atrial fib with RVR, initially tx'd with a diltiazem drip.    She was seen by Cardiology and underwent ablation and pacemaker placement on 7/5    Metoprolol and diltiazem discontinued    She also had a left thoracentesis for pleural effusion, with 250 mls returned.     Today she is seen on the unit, up to WC.   Reports feeling okay, continued SALAZAR.   No pain at pacemaker site.  No palpitations or CP   Has been working with therapies, with improvement to standing tolerance of 4 minutes while working on a [employer] puzzle.       Pt also hospitalized in April with atrial fib with RVR and CHFpEF.     She presented with dyspnea, LE edema and palpitations, was started on diltiazem and metoprolol dose increased to improve VR control; has had lower bps since then with variable HRs.  Pt also has multiple myeloma with bone metastasis, diagnosed in 2016 by BM biopsy    She follows with Dr Robetrs and was treated with daratumumab, Velcade, and dexamethasone every 2 weeks, but on hold due to low functional status and hospitalizations.     She has had recent weakness and falls, has a thoracic compression fracture with chronic back pain as well as pain from bony mets.     She has also had gradual cognitive decline, worse over past few months.   She lives with her  who is fairly frail, and son who assists with ADLs.       Past Medical History:   Diagnosis Date     Abnormal CXR 2018    then ct done and not significant     Acute diastolic heart failure (H) 03/22/2019    nl ef, 2+mr and tr with severe pulm htn     Ascending aorta dilatation (H) 04/2016    on echo, mild, fu 7/18 4.0, slightly larger     Cancer, metastatic to bone (H)     due to myeloma     Colonic polyp 2008    adenomatous, fu 2013 tics only      Compression fracture     multiple areas of spine     Dry eyes      Elevated MCV     b12 and folic acid nl     HTN (hypertension) 2000    off meds for years     Lung nodule 2018    on ct, 4mm, ct done for fu abnl cxr     Menorrhagia     hysteroscopy and d and c done     MGUS (monoclonal gammopathy of unknown significance)     eval by Dr. Roberts     Multiple myeloma (H)     dx  at [facility], bone lesions seen on mri      OAB (overactive bladder)     Dr. Grullon     Osteoporosis     fu done  and stable, went off meds then, fu done ; has had gyn fu and added evista 2013 by gyn     Palpitations     nl echo, mildly dilated asc aorta     Paroxysmal atrial fibrillation (H)     had palp and ziopatch showed it, echo nl lv fxn, mild mr and tr, added coum and toprol, toprol dose raised 16     Pulmonary hypertension (H) 2019    seen on echo     Sciatica of left side     Dr. Helen Ricardo      SVT (supraventricular tachycardia) (H)     on ziopatch     Thrombocytopenia (H)        Past Surgical History:   Procedure Laterality Date     BONE MARROW BIOPSY, BONE SPECIMEN, NEEDLE/TROCAR N/A 2016    Procedure: BIOPSY BONE MARROW;  Surgeon: Bryan Patel MD;  Location:  GI     CATARACT IOL, RT/LT        SECTION  , 1966     COLONOSCOPY  2013    Procedure: COLONOSCOPY;  COLONOSCOPY;  Surgeon: Steffany Rockwell MD;  Location:  GI     EP ABLATION AV NODE N/A 2019    Procedure: EP Ablation AV Node;  Surgeon: Lee Menchaca MD;  Location:  HEART CARDIAC CATH LAB     EP PACEMAKER N/A 2019    Procedure: EP Pacemaker;  Surgeon: Lee Menchaca MD;  Location:  HEART CARDIAC CATH LAB     EXCISE EXOSTOSIS TIBIA / FIBULA  2014    Procedure: EXCISE EXOSTOSIS TIBIA / FIBULA;  Surgeon: Naila Pichardo MD;  Location:  SD     hysteroscopy and d and c      due to bleeding     left anle replacement   "2007     right ankle surgery  2003       Family History   Problem Relation Age of Onset     Heart Disease Father      MELVI. Mother      Cerebrovascular Disease Brother      Family History Negative Sister      Family History Negative Sister      Family History Negative Brother      Social History     Tobacco Use     Smoking status: Never Smoker     Smokeless tobacco: Never Used   Substance Use Topics     Alcohol use: No     Alcohol/week: 0.0 oz      SH:  Lives with her  Tom and son Dannie, house in [place].     She has 6 children.  Her daughter Yesi is a psychologist, lives next door.        Pt is a retired nurse.    Review Of Systems  Skin: negative   Eyes: impaired vision  Ears/Nose/Throat: hearing loss  Respiratory: + dyspnea on exertion  Cardiovascular: as above  Gastrointestinal: negative  Genitourinary: negative  Musculoskeletal: generalized weakness, assist with transfers and ambulation      Neurologic: MoCA 13/25, SLUMS 6/30       Psychiatric: negative  Hematologic/Lymphatic/Immunologic: anemia  Endocrine: negative      GENERAL APPEARANCE: alert and no distress, frail  /77   Pulse 82   Temp 98.7  F (37.1  C)   Resp 17   Ht 1.6 m (5' 3\")   Wt 60.4 kg (133 lb 1.6 oz)   SpO2 98%   BMI 23.58 kg/m      HEENT: normocephalic, no lesion or abnormalities  NECK: no adenopathy, no asymmetry, masses, or scars and thyroid normal to palpation  RESP: decreased BS left base, no rales or wheeze  CV: irregular rate and rhythm, normal S1 S2, 1/6 HSM  ABDOMEN:  soft, nontender, no HSM or masses and bowel sounds normal  MS: +kyphosis, 1+ LE edema  SKIN: no suspicious lesions or rashes; incision left upper chest wall with steri strips in place.   No edema or inflammation, dry.     NEURO: generalized weakness, limited history    No tremor or stiffness.      PSYCH: affect okay  LYMPHATICS: No cervical,  or supraclavicular nodes    Last Comprehensive Metabolic Panel:  Sodium   Date Value Ref Range Status "   07/07/2019 142 133 - 144 mmol/L Final     Potassium   Date Value Ref Range Status   07/07/2019 3.8 3.4 - 5.3 mmol/L Final     Chloride   Date Value Ref Range Status   07/07/2019 109 94 - 109 mmol/L Final     Carbon Dioxide   Date Value Ref Range Status   07/07/2019 28 20 - 32 mmol/L Final     Anion Gap   Date Value Ref Range Status   07/07/2019 5 3 - 14 mmol/L Final     Glucose   Date Value Ref Range Status   07/07/2019 94 70 - 99 mg/dL Final     Urea Nitrogen   Date Value Ref Range Status   07/07/2019 21 7 - 30 mg/dL Final     Creatinine   Date Value Ref Range Status   07/07/2019 0.52 0.52 - 1.04 mg/dL Final     GFR Estimate   Date Value Ref Range Status   07/07/2019 86 >60 mL/min/[1.73_m2] Final     Comment:     Non  GFR Calc  Starting 12/18/2018, serum creatinine based estimated GFR (eGFR) will be   calculated using the Chronic Kidney Disease Epidemiology Collaboration   (CKD-EPI) equation.       Calcium   Date Value Ref Range Status   07/07/2019 8.4 (L) 8.5 - 10.1 mg/dL Final     Lab Results   Component Value Date    AST 22 07/03/2019     Lab Results   Component Value Date    ALT 32 07/03/2019      BILITOTAL 0.6 07/03/2019     Lab Results   Component Value Date    ALBUMIN 2.4 07/03/2019     Lab Results   Component Value Date    PROTTOTAL 5.7 07/05/2019      Lab Results   Component Value Date    ALKPHOS 52 07/03/2019     Lab Results   Component Value Date    WBC 7.9 07/06/2019      HGB 10.5 07/06/2019      MCV 96 07/06/2019       07/06/2019     TSH   Date Value Ref Range Status   03/22/2019 2.31 0.40 - 4.00 mU/L Final      ECHO 3/22/19 Interpretation Summary  The left ventricle is normal in size.  The visual ejection fraction is estimated at 55-60%.  No regional wall motion abnormalities noted.  There is moderate (2+) mitral regurgitation.  There is moderate (2+) tricuspid regurgitation.  Severe (>55mmHg) pulmonary hypertension is present.  The rhythm was rapid atrial  fibrillation.      IMP/PLAN:    (I48.91) Atrial fibrillation with rapid ventricular response (H)  (primary encounter diagnosis)  (Z98.890,  Z86.79) S/P ablation of atrial fibrillation  (Z95.0) S/P placement of cardiac pacemaker  Comment: CHADS-VASc score 5; pacemaker site looks good     Plan: warfarin per INR for stroke prophylaxis   Monitor incision, follow up in [facility] Clinic.       (I50.33) Acute on chronic diastolic congestive heart failure (H)  (I27.20) Pulmonary hypertension (H)  (J90) Pleural effusion  Comment: a little volume up today  Wt Readings from Last 5 Encounters:   07/09/19 60.4 kg (133 lb 1.6 oz)   07/08/19 60.3 kg (133 lb)   07/07/19 59.5 kg (131 lb 1.6 oz)   07/01/19 58.9 kg (129 lb 14.4 oz)   06/26/19 60.8 kg (134 lb)      Plan: furosemide 20 mg/day   She has decreased BS in her LLL c/w recurrence of effusion.   Consider increasing furosemide to bid, if more symptomatic will need to set up for repeat thoracentesis.       (R41.89) Cognitive impairment  Comment: scores as above and very low functional status, now c/w dx of dementia  Plan: Recommend higher level of care at discharge.    to work with family.       (C90.00) Multiple myeloma not having achieved remission (H)  Comment: CTX on hold due to low functional status and hospitalizations     Calcium 11  Plan: Continue to follow, she has an appointment with Dr Roberts on 8/7     Continue dexamethasone    (S22.000G) Closed compression fracture of thoracic vertebra with delayed healing, subsequent encounter  (G89.29) Other chronic pain  Comment: bone lesions secondary to multiple myeloma  Plan: prn acetaminophen, Percocet    (R53.81) Physical deconditioning  Comment: falls, generalized weakness   Plan: PHYSICAL THERAPY / OCCUPATIONAL THERAPY for strengthening, balance, gait, ADLs.     Formal cognitive testing per OCCUPATIONAL THERAPY; likely needs higher level of support at discharge.       Advanced directives: pt was seen by  Palliative Care while hospitalized, and is DNR/DNI     Edith Ash MD       Sincerely,        Edith Ash MD

## 2019-07-10 ENCOUNTER — HOSPITAL LABORATORY (OUTPATIENT)
Dept: OTHER | Facility: CLINIC | Age: 84
End: 2019-07-10

## 2019-07-10 LAB
ANION GAP SERPL CALCULATED.3IONS-SCNC: 8 MMOL/L (ref 3–14)
BACTERIA SPEC CULT: NO GROWTH
BUN SERPL-MCNC: 18 MG/DL (ref 7–30)
CALCIUM SERPL-MCNC: 9 MG/DL (ref 8.5–10.1)
CHLORIDE SERPL-SCNC: 106 MMOL/L (ref 94–109)
CO2 SERPL-SCNC: 26 MMOL/L (ref 20–32)
CREAT SERPL-MCNC: 0.54 MG/DL (ref 0.52–1.04)
GFR SERPL CREATININE-BSD FRML MDRD: 85 ML/MIN/{1.73_M2}
GLUCOSE SERPL-MCNC: 82 MG/DL (ref 70–99)
HGB BLD-MCNC: 11.9 G/DL (ref 11.7–15.7)
INTERPRETATION ECG - MUSE: NORMAL
POTASSIUM SERPL-SCNC: 5.2 MMOL/L (ref 3.4–5.3)
SODIUM SERPL-SCNC: 140 MMOL/L (ref 133–144)
SPECIMEN SOURCE: NORMAL

## 2019-07-11 ENCOUNTER — NURSING HOME VISIT (OUTPATIENT)
Dept: GERIATRICS | Facility: CLINIC | Age: 84
End: 2019-07-11
Payer: MEDICARE

## 2019-07-11 VITALS
BODY MASS INDEX: 23.81 KG/M2 | DIASTOLIC BLOOD PRESSURE: 69 MMHG | WEIGHT: 134.4 LBS | HEIGHT: 63 IN | TEMPERATURE: 97.9 F | RESPIRATION RATE: 18 BRPM | OXYGEN SATURATION: 99 % | SYSTOLIC BLOOD PRESSURE: 104 MMHG | HEART RATE: 85 BPM

## 2019-07-11 DIAGNOSIS — Z95.0 S/P PLACEMENT OF CARDIAC PACEMAKER: ICD-10-CM

## 2019-07-11 DIAGNOSIS — J90 PLEURAL EFFUSION: ICD-10-CM

## 2019-07-11 DIAGNOSIS — I48.91 ATRIAL FIBRILLATION WITH RAPID VENTRICULAR RESPONSE (H): Primary | ICD-10-CM

## 2019-07-11 DIAGNOSIS — R53.81 PHYSICAL DECONDITIONING: ICD-10-CM

## 2019-07-11 DIAGNOSIS — R41.89 COGNITIVE IMPAIRMENT: ICD-10-CM

## 2019-07-11 DIAGNOSIS — Z86.79 S/P ABLATION OF ATRIAL FIBRILLATION: ICD-10-CM

## 2019-07-11 DIAGNOSIS — I27.20 PULMONARY HYPERTENSION (H): ICD-10-CM

## 2019-07-11 DIAGNOSIS — Z98.890 S/P ABLATION OF ATRIAL FIBRILLATION: ICD-10-CM

## 2019-07-11 DIAGNOSIS — I50.33 ACUTE ON CHRONIC DIASTOLIC CONGESTIVE HEART FAILURE (H): ICD-10-CM

## 2019-07-11 DIAGNOSIS — C90.00 MULTIPLE MYELOMA NOT HAVING ACHIEVED REMISSION (H): ICD-10-CM

## 2019-07-11 DIAGNOSIS — I10 BENIGN ESSENTIAL HYPERTENSION: ICD-10-CM

## 2019-07-11 PROCEDURE — 99309 SBSQ NF CARE MODERATE MDM 30: CPT | Performed by: NURSE PRACTITIONER

## 2019-07-11 ASSESSMENT — MIFFLIN-ST. JEOR: SCORE: 1018.76

## 2019-07-11 NOTE — LETTER
"    7/11/2019        RE: Amira Arreola  [address]  [facility] [address]        [facility]  [place] [facility] Record Number:  [id number]  Place of Service where encounter took place:  [facility] [[id number]]  Chief Complaint   Patient presents with     RECHECK       HPI:    Amira Arreola  is a 86 year old (7/17/1932), who is being seen today for an episodic care visit.  HPI information obtained from: facility chart records, facility staff, patient report and [facility] chart review.   She came to this facility 7/7/2019 for short term rehab and medical management following hospitalization from the Cardiology clinic with hypotension and altered mental status. She was in afib with RVR and initially required diltiazem drip. She underwent ablation with pacemaker placement 7/5/2019. Metoprolol and diltiazem were discontinued. Lasix was held, then resumed at lower dose. CXR with left pleural effusion and she underwent left thoracentesis  7/5/2019 with 250 ml drained. Palliative Care consulted for goals of care and code status was changed to DNR/DNI. Chemotherapy is on hold due to decline in both cognition and functional status.   Calcium level was elevated at 11 on admission,  improved with IVF. Bisphosphonate therapy is on hold.       Today's concern is:     Atrial fibrillation with rapid ventricular response (H)-HR: 82-98   INR 2.1 today on alternating 2/4 mg coumadin daily. No bleeding. Denies pain at pacemaker site.  S/P ablation of atrial fibrillation  S/P placement of cardiac pacemaker  Acute on chronic diastolic congestive heart failure (H)-weight unchanged at 133 lbs. Reports her breathing is \"a little easier.\" No hypoxia. No cough or chest pain.   Pulmonary hypertension (H)  Pleural effusion  Multiple myeloma not having achieved remission (H)-reports back pain is controlled. Using percocet once daily.   Benign essential hypertension-BPs: 112/69, 109/72, " 124/76, 122/77  Cognitive impairment-pleasant and talkative. Poor historian. Thought she was going home today. SLUMS 8/30  Physical deconditioning-ambulating 60 ft with walker and contact guard assist. Requires stand by to contact guard assist with transfers and cares.     Past Medical and Surgical History reviewed in Epic today.    MEDICATIONS:  Current Outpatient Medications   Medication Sig Dispense Refill     acetaminophen (TYLENOL) 500 MG tablet Take 500 mg by mouth every 6 hours as needed for mild pain        acyclovir (ZOVIRAX) 400 MG tablet Take 1 tablet (400 mg) by mouth 2 times daily 60 tablet 3     Carboxymethylcellulose Sod PF (REFRESH PLUS) 0.5 % SOLN ophthalmic solution 1 drop 4 times daily as needed for dry eyes       dexamethasone (DECADRON) 4 MG tablet Take 20mg (5 tablets) by mouth every week. 28 tablet 3     furosemide (LASIX) 20 MG tablet Take 1 tablet (20 mg) by mouth daily       latanoprost (XALATAN) 0.005 % ophthalmic solution Place 1 drop into the right eye At Bedtime       lidocaine-prilocaine (EMLA) cream Apply to port site 1 hour prior to access 30 g 1     Multiple Vitamins-Minerals (DAILY MULTIVITAMIN) CAPS Take 1 tablet by mouth daily        oxyCODONE-acetaminophen (PERCOCET) 5-325 MG tablet Take 1 tablet by mouth every 4 hours as needed for breakthrough pain 30 tablet 0     polyethylene glycol (MIRALAX/GLYCOLAX) powder Take 17 g by mouth daily as needed for constipation        potassium chloride (KLOR-CON) 20 MEQ packet Take 20 mEq by mouth daily 60 packet 1     prochlorperazine (COMPAZINE) 10 MG tablet Take 1 tablet (10 mg) by mouth every 6 hours as needed for nausea or vomiting 30 tablet 1     SIMBRINZA 1-0.2 % ophthalmic suspension Place 1 drop into the right eye 2 times daily 1 drop AM and PM  2     Sodium Fluoride (SF 5000 PLUS) 1.1 % CREA Apply to affected area 3 times daily       warfarin (COUMADIN) 4 MG tablet Take one tablet (4 mg) by mouth on Tuesdays and Saturdays; take  "one-half tablet ( 2 mg) on all other days of the week.         REVIEW OF SYSTEMS:  4 point ROS including Respiratory, CV, GI and , other than that noted in the HPI,  is negative    Objective:  /69   Pulse 85   Temp 97.9  F (36.6  C)   Resp 18   Ht 1.6 m (5' 3\")   Wt 61 kg (134 lb 6.4 oz)   SpO2 99%   BMI 23.81 kg/m     Exam:  GENERAL APPEARANCE:  Alert, in no distress  ENT:  Mouth and posterior oropharynx normal, moist mucous membranes, [place]  EYES:  EOM normal, conjunctiva and lids normal  NECK:  No adenopathy,masses or thyromegaly  RESP:  respiratory effort and palpation of chest normal, no respiratory distress, diminished breath sounds left base, no crackles. Right side is clear  CV:  Palpation and auscultation of heart done , regular rate and rhythm, no murmur,  +2 pedal pulses, no LE edema   ABDOMEN: soft, nontender, no hepatosplenomegaly or other masses  M/S:   wheelchair. LOPEZ with good strength. No joint inflammation  SKIN:  pacemaker  site clean,dry, intact, no erythema.  No rashes or open areas  PSYCH:  oriented to self, place, situation, insight and judgement impaired, memory impaired , affect and mood normal    Labs:   Hemoglobin   Date Value Ref Range Status   07/10/2019 11.9 11.7 - 15.7 g/dL Final   ]  Last Comprehensive Metabolic Panel:  Sodium   Date Value Ref Range Status   07/10/2019 140 133 - 144 mmol/L Final     Potassium   Date Value Ref Range Status   07/10/2019 5.2 3.4 - 5.3 mmol/L Final     Comment:     Specimen slightly hemolyzed, potassium may be falsely elevated     Chloride   Date Value Ref Range Status   07/10/2019 106 94 - 109 mmol/L Final     Carbon Dioxide   Date Value Ref Range Status   07/10/2019 26 20 - 32 mmol/L Final     Anion Gap   Date Value Ref Range Status   07/10/2019 8 3 - 14 mmol/L Final     Glucose   Date Value Ref Range Status   07/10/2019 82 70 - 99 mg/dL Final     Urea Nitrogen   Date Value Ref Range Status   07/10/2019 18 7 - 30 mg/dL Final     Creatinine "   Date Value Ref Range Status   07/10/2019 0.54 0.52 - 1.04 mg/dL Final     GFR Estimate   Date Value Ref Range Status   07/10/2019 85 >60 mL/min/[1.73_m2] Final     Comment:     Non  GFR Calc  Starting 12/18/2018, serum creatinine based estimated GFR (eGFR) will be   calculated using the Chronic Kidney Disease Epidemiology Collaboration   (CKD-EPI) equation.       Calcium   Date Value Ref Range Status   07/10/2019 9.0 8.5 - 10.1 mg/dL Final       ASSESSMENT / PLAN:  (I48.91) Atrial fibrillation with rapid ventricular response (H)  (primary encounter diagnosis)  (Z98.890,  Z86.79) S/P ablation of atrial fibrillation  (Z95.0) S/P placement of cardiac pacemaker  Comment: rate controlled. INR therapeutic. Pacemaker site healing without signs of infection.   Plan: coumadin 4 mg on Tues and Sat, 2 mg the other days of the week. INR 7/15/2019. Follow up [facility] Clinic as scheduled.     (I50.33) Acute on chronic diastolic congestive heart failure (H)  (I27.20) Pulmonary hypertension (H)  (J90) Pleural effusion  Comment: volume status improved. No hypoxia or worsening respiratory symptoms   Plan: continue lasix. Follow BMP. Daily weight.Consider repeat thoracentesis if respiratory status declines. Compression stockings during the day. Low sodium diet. [facility] follow up as scheduled.     (C90.00) Multiple myeloma not having achieved remission (H)  Comment: pain controlled. Chemotherapy on hold due to poor functional status.   Plan: continue decadron and percocet. Follow up with Dr Roberts 8/7/2019    (I10) Benign essential hypertension  Comment: hypotension improved  Plan: continue lasix for CHF. Monitor VS. Up with assist.     (R41.89) Cognitive impairment  Comment: moderate to severe deficits. Requires 24 hr supervision  Plan: CPT requested. Supportive care     (R53.81) Physical deconditioning  Comment: slowly progressing in therapies  Plan: continue PHYSICAL THERAPY/OT. Disposition unclear at this  time-family is aware she may need a higher level of care than can be managed at home. Care conference is scheduled.       Electronically signed by:  DAYSI English CNP               Sincerely,        DAYIS English CNP

## 2019-07-11 NOTE — PROGRESS NOTES
"[facility]  [place] Medical Record Number:  [id number]  [facility] of Service where encounter took place:  [facility] [[id number]]  Chief Complaint   Patient presents with     RECHECK       HPI:    Amira Arreola  is a 86 year old (7/17/1932), who is being seen today for an episodic care visit.  HPI information obtained from: facility chart records, facility staff, patient report and [facility] chart review.   She came to this facility 7/7/2019 for short term rehab and medical management following hospitalization from the Cardiology clinic with hypotension and altered mental status. She was in afib with RVR and initially required diltiazem drip. She underwent ablation with pacemaker placement 7/5/2019. Metoprolol and diltiazem were discontinued. Lasix was held, then resumed at lower dose. CXR with left pleural effusion and she underwent left thoracentesis  7/5/2019 with 250 ml drained. Palliative Care consulted for goals of care and code status was changed to DNR/DNI. Chemotherapy is on hold due to decline in both cognition and functional status.   Calcium level was elevated at 11 on admission,  improved with IVF. Bisphosphonate therapy is on hold.       Today's concern is:     Atrial fibrillation with rapid ventricular response (H)-HR: 82-98   INR 2.1 today on alternating 2/4 mg coumadin daily. No bleeding. Denies pain at pacemaker site.  S/P ablation of atrial fibrillation  S/P placement of cardiac pacemaker  Acute on chronic diastolic congestive heart failure (H)-weight unchanged at 133 lbs. Reports her breathing is \"a little easier.\" No hypoxia. No cough or chest pain.   Pulmonary hypertension (H)  Pleural effusion  Multiple myeloma not having achieved remission (H)-reports back pain is controlled. Using percocet once daily.   Benign essential hypertension-BPs: 112/69, 109/72, 124/76, 122/77  Cognitive impairment-pleasant and talkative. Poor historian. Thought she was " going home today. SLUMS 8/30  Physical deconditioning-ambulating 60 ft with walker and contact guard assist. Requires stand by to contact guard assist with transfers and cares.     Past Medical and Surgical History reviewed in Epic today.    MEDICATIONS:  Current Outpatient Medications   Medication Sig Dispense Refill     acetaminophen (TYLENOL) 500 MG tablet Take 500 mg by mouth every 6 hours as needed for mild pain        acyclovir (ZOVIRAX) 400 MG tablet Take 1 tablet (400 mg) by mouth 2 times daily 60 tablet 3     Carboxymethylcellulose Sod PF (REFRESH PLUS) 0.5 % SOLN ophthalmic solution 1 drop 4 times daily as needed for dry eyes       dexamethasone (DECADRON) 4 MG tablet Take 20mg (5 tablets) by mouth every week. 28 tablet 3     furosemide (LASIX) 20 MG tablet Take 1 tablet (20 mg) by mouth daily       latanoprost (XALATAN) 0.005 % ophthalmic solution Place 1 drop into the right eye At Bedtime       lidocaine-prilocaine (EMLA) cream Apply to port site 1 hour prior to access 30 g 1     Multiple Vitamins-Minerals (DAILY MULTIVITAMIN) CAPS Take 1 tablet by mouth daily        oxyCODONE-acetaminophen (PERCOCET) 5-325 MG tablet Take 1 tablet by mouth every 4 hours as needed for breakthrough pain 30 tablet 0     polyethylene glycol (MIRALAX/GLYCOLAX) powder Take 17 g by mouth daily as needed for constipation        potassium chloride (KLOR-CON) 20 MEQ packet Take 20 mEq by mouth daily 60 packet 1     prochlorperazine (COMPAZINE) 10 MG tablet Take 1 tablet (10 mg) by mouth every 6 hours as needed for nausea or vomiting 30 tablet 1     SIMBRINZA 1-0.2 % ophthalmic suspension Place 1 drop into the right eye 2 times daily 1 drop AM and PM  2     Sodium Fluoride (SF 5000 PLUS) 1.1 % CREA Apply to affected area 3 times daily       warfarin (COUMADIN) 4 MG tablet Take one tablet (4 mg) by mouth on Tuesdays and Saturdays; take one-half tablet ( 2 mg) on all other days of the week.         REVIEW OF SYSTEMS:  4 point ROS  "including Respiratory, CV, GI and , other than that noted in the HPI,  is negative    Objective:  /69   Pulse 85   Temp 97.9  F (36.6  C)   Resp 18   Ht 1.6 m (5' 3\")   Wt 61 kg (134 lb 6.4 oz)   SpO2 99%   BMI 23.81 kg/m    Exam:  GENERAL APPEARANCE:  Alert, in no distress  ENT:  Mouth and posterior oropharynx normal, moist mucous membranes, [place]  EYES:  EOM normal, conjunctiva and lids normal  NECK:  No adenopathy,masses or thyromegaly  RESP:  respiratory effort and palpation of chest normal, no respiratory distress, diminished breath sounds left base, no crackles. Right side is clear  CV:  Palpation and auscultation of heart done , regular rate and rhythm, no murmur,  +2 pedal pulses, no LE edema   ABDOMEN: soft, nontender, no hepatosplenomegaly or other masses  M/S:   wheelchair. LOPEZ with good strength. No joint inflammation  SKIN:  pacemaker site clean,dry, intact, no erythema.  No rashes or open areas  PSYCH:  oriented to self, place, situation, insight and judgement impaired, memory impaired , affect and mood normal    Labs:   Hemoglobin   Date Value Ref Range Status   07/10/2019 11.9 11.7 - 15.7 g/dL Final   ]  Last Comprehensive Metabolic Panel:  Sodium   Date Value Ref Range Status   07/10/2019 140 133 - 144 mmol/L Final     Potassium   Date Value Ref Range Status   07/10/2019 5.2 3.4 - 5.3 mmol/L Final     Comment:     Specimen slightly hemolyzed, potassium may be falsely elevated     Chloride   Date Value Ref Range Status   07/10/2019 106 94 - 109 mmol/L Final     Carbon Dioxide   Date Value Ref Range Status   07/10/2019 26 20 - 32 mmol/L Final     Anion Gap   Date Value Ref Range Status   07/10/2019 8 3 - 14 mmol/L Final     Glucose   Date Value Ref Range Status   07/10/2019 82 70 - 99 mg/dL Final     Urea Nitrogen   Date Value Ref Range Status   07/10/2019 18 7 - 30 mg/dL Final     Creatinine   Date Value Ref Range Status   07/10/2019 0.54 0.52 - 1.04 mg/dL Final     GFR Estimate   Date " Value Ref Range Status   07/10/2019 85 >60 mL/min/[1.73_m2] Final     Comment:     Non  GFR Calc  Starting 12/18/2018, serum creatinine based estimated GFR (eGFR) will be   calculated using the Chronic Kidney Disease Epidemiology Collaboration   (CKD-EPI) equation.       Calcium   Date Value Ref Range Status   07/10/2019 9.0 8.5 - 10.1 mg/dL Final       ASSESSMENT / PLAN:  (I48.91) Atrial fibrillation with rapid ventricular response (H)  (primary encounter diagnosis)  (Z98.890,  Z86.79) S/P ablation of atrial fibrillation  (Z95.0) S/P placement of cardiac pacemaker  Comment: rate controlled. INR therapeutic. Pacemaker site healing without signs of infection.   Plan: coumadin 4 mg on Tues and Sat, 2 mg the other days of the week. INR 7/15/2019. Follow up Device Clinic as scheduled.     (I50.33) Acute on chronic diastolic congestive heart failure (H)  (I27.20) Pulmonary hypertension (H)  (J90) Pleural effusion  Comment: volume status improved. No hypoxia or worsening respiratory symptoms   Plan: continue lasix. Follow BMP. Daily weight.Consider repeat thoracentesis if respiratory status declines. Compression stockings during the day. Low sodium diet. [facility] follow up as scheduled.     (C90.00) Multiple myeloma not having achieved remission (H)  Comment: pain controlled. Chemotherapy on hold due to poor functional status.   Plan: continue decadron and percocet. Follow up with Dr Roberts 8/7/2019    (I10) Benign essential hypertension  Comment: hypotension improved  Plan: continue lasix for CHF. Monitor VS. Up with assist.     (R41.89) Cognitive impairment  Comment: moderate to severe deficits. Requires 24 hr supervision  Plan: CPT requested. Supportive care     (R53.81) Physical deconditioning  Comment: slowly progressing in therapies  Plan: continue PHYSICAL THERAPY/OT. Disposition unclear at this time-family is aware she may need a higher level of care than can be managed at home. Care conference  is scheduled.       Electronically signed by:  DAYSI English CNP

## 2019-07-15 ENCOUNTER — NURSING HOME VISIT (OUTPATIENT)
Dept: GERIATRICS | Facility: CLINIC | Age: 84
End: 2019-07-15
Payer: MEDICARE

## 2019-07-15 VITALS
RESPIRATION RATE: 16 BRPM | TEMPERATURE: 97.7 F | HEIGHT: 63 IN | WEIGHT: 133 LBS | SYSTOLIC BLOOD PRESSURE: 130 MMHG | HEART RATE: 82 BPM | OXYGEN SATURATION: 97 % | BODY MASS INDEX: 23.57 KG/M2 | DIASTOLIC BLOOD PRESSURE: 77 MMHG

## 2019-07-15 DIAGNOSIS — I27.20 PULMONARY HYPERTENSION (H): ICD-10-CM

## 2019-07-15 DIAGNOSIS — R41.89 COGNITIVE IMPAIRMENT: ICD-10-CM

## 2019-07-15 DIAGNOSIS — R53.81 PHYSICAL DECONDITIONING: ICD-10-CM

## 2019-07-15 DIAGNOSIS — I50.33 ACUTE ON CHRONIC DIASTOLIC CONGESTIVE HEART FAILURE (H): ICD-10-CM

## 2019-07-15 DIAGNOSIS — Z98.890 S/P ABLATION OF ATRIAL FIBRILLATION: ICD-10-CM

## 2019-07-15 DIAGNOSIS — I48.91 ATRIAL FIBRILLATION WITH RAPID VENTRICULAR RESPONSE (H): Primary | ICD-10-CM

## 2019-07-15 DIAGNOSIS — C90.00 MULTIPLE MYELOMA NOT HAVING ACHIEVED REMISSION (H): ICD-10-CM

## 2019-07-15 DIAGNOSIS — Z86.79 S/P ABLATION OF ATRIAL FIBRILLATION: ICD-10-CM

## 2019-07-15 DIAGNOSIS — I10 BENIGN ESSENTIAL HYPERTENSION: ICD-10-CM

## 2019-07-15 DIAGNOSIS — Z95.0 S/P PLACEMENT OF CARDIAC PACEMAKER: ICD-10-CM

## 2019-07-15 DIAGNOSIS — J90 PLEURAL EFFUSION: ICD-10-CM

## 2019-07-15 PROCEDURE — 99309 SBSQ NF CARE MODERATE MDM 30: CPT | Performed by: NURSE PRACTITIONER

## 2019-07-15 ASSESSMENT — MIFFLIN-ST. JEOR: SCORE: 1012.41

## 2019-07-15 NOTE — LETTER
7/15/2019        RE: Amira Arreola  [address]  [facility] [address]        [facility]  [place] Medical Record Number:  [id number]  [facility] where encounter took place[facility] [[id number]]  Chief Complaint   Patient presents with     RECHECK       HPI:    Amira Arreola  is a 86 year old (7/17/1932), who is being seen today for an episodic care visit.  HPI information obtained from: facility chart records, facility staff, patient report and [facility] chart review.   She came to this facility 7/7/2019 for short term rehab and medical management following hospitalization from the Cardiology clinic with hypotension and altered mental status. She was in afib with RVR and initially required diltiazem drip. She underwent ablation with pacemaker placement 7/5/2019. Metoprolol and diltiazem were discontinued. Lasix was held, then resumed at lower dose. CXR with left pleural effusion and she underwent left thoracentesis  7/5/2019 with 250 ml drained. Palliative Care consulted for goals of care and code status was changed to DNR/DNI. Chemotherapy is on hold due to decline in both cognition and functional status.   Calcium level was elevated at 11 on admission,  improved with IVF. Bisphosphonate therapy is on hold.     Today's concern is:     Atrial fibrillation with rapid ventricular response (H)-INR 1.9 today on her usual coumadin dose of 4 mg on Tues and Sat, 2 mg the other days of the week. No bleeding. HR: 78-82   Tired this morning after working with therapy. Reports good appetite. Denies pain of any type, including pacemaker site.   S/P ablation of atrial fibrillation  S/P placement of cardiac pacemaker  Acute on chronic diastolic congestive heart failure (H)-weight unchanged at 133 lbs. Denies cough, shortness of breath or chest pain.   Pulmonary hypertension (H)  Pleural effusion  Multiple myeloma not having achieved remission (H)  Benign  essential hypertension-BPs: 130/77, 130/86, 116/68  Cognitive impairment-pleasant and talkative. Poor historian. Cooperative with cares.SLUMS 8/30,   Physical deconditioning-ambulating up tot 60 ft with walker and contact guard assist. Requires assist of 1 with transfers and cares.     Past Medical and Surgical History reviewed in Epic today.    MEDICATIONS:  Current Outpatient Medications   Medication Sig Dispense Refill     acetaminophen (TYLENOL) 500 MG tablet Take 500 mg by mouth every 6 hours as needed for mild pain        acyclovir (ZOVIRAX) 400 MG tablet Take 1 tablet (400 mg) by mouth 2 times daily 60 tablet 3     Carboxymethylcellulose Sod PF (REFRESH PLUS) 0.5 % SOLN ophthalmic solution 1 drop 4 times daily as needed for dry eyes       dexamethasone (DECADRON) 4 MG tablet Take 20mg (5 tablets) by mouth every week. 28 tablet 3     furosemide (LASIX) 20 MG tablet Take 1 tablet (20 mg) by mouth daily       latanoprost (XALATAN) 0.005 % ophthalmic solution Place 1 drop into the right eye At Bedtime       lidocaine-prilocaine (EMLA) cream Apply to port site 1 hour prior to access 30 g 1     Multiple Vitamins-Minerals (DAILY MULTIVITAMIN) CAPS Take 1 tablet by mouth daily        oxyCODONE-acetaminophen (PERCOCET) 5-325 MG tablet Take 1 tablet by mouth every 4 hours as needed for breakthrough pain 30 tablet 0     polyethylene glycol (MIRALAX/GLYCOLAX) powder Take 17 g by mouth daily as needed for constipation        potassium chloride (KLOR-CON) 20 MEQ packet Take 20 mEq by mouth daily 60 packet 1     prochlorperazine (COMPAZINE) 10 MG tablet Take 1 tablet (10 mg) by mouth every 6 hours as needed for nausea or vomiting 30 tablet 1     SIMBRINZA 1-0.2 % ophthalmic suspension Place 1 drop into the right eye 2 times daily 1 drop AM and PM  2     Sodium Fluoride (SF 5000 PLUS) 1.1 % CREA Apply to affected area 3 times daily       warfarin (COUMADIN) 4 MG tablet Take one tablet (4 mg) by mouth on Tuesdays and  "Saturdays; take one-half tablet ( 2 mg) on all other days of the week.         REVIEW OF SYSTEMS:  4 point ROS including Respiratory, CV, GI and , other than that noted in the HPI,  is negative    Objective:  /77   Pulse 82   Temp 97.7  F (36.5  C)   Resp 16   Ht 1.6 m (5' 3\")   Wt 60.3 kg (133 lb)   SpO2 97%   BMI 23.56 kg/m     Exam:  GENERAL APPEARANCE:  Alert, in no distress  ENT:  Mouth and posterior oropharynx normal, moist mucous membranes, [place]  EYES:  EOM normal, conjunctiva and lids normal  NECK:  No adenopathy,masses or thyromegaly  RESP:  respiratory effort and palpation of chest normal, no respiratory distress, diminished breath sounds left base, no crackles. Right side is clear  CV:  Palpation and auscultation of heart done , regular rate and rhythm, no murmur,  +2 pedal pulses, no LE edema   ABDOMEN: soft, nontender, no hepatosplenomegaly or other masses  M/S:   in bed, LOPEZ with good strength. No joint inflammation  SKIN:  pacemaker site clean,dry, intact, no erythema.  No rashes or open areas  PSYCH:  oriented to self, place, situation, insight and judgement impaired, memory impaired , affect and mood normal    Labs:   Hemoglobin   Date Value Ref Range Status   07/10/2019 11.9 11.7 - 15.7 g/dL Final   ]  Last Comprehensive Metabolic Panel:  Sodium   Date Value Ref Range Status   07/10/2019 140 133 - 144 mmol/L Final     Potassium   Date Value Ref Range Status   07/10/2019 5.2 3.4 - 5.3 mmol/L Final     Comment:     Specimen slightly hemolyzed, potassium may be falsely elevated     Chloride   Date Value Ref Range Status   07/10/2019 106 94 - 109 mmol/L Final     Carbon Dioxide   Date Value Ref Range Status   07/10/2019 26 20 - 32 mmol/L Final     Anion Gap   Date Value Ref Range Status   07/10/2019 8 3 - 14 mmol/L Final     Glucose   Date Value Ref Range Status   07/10/2019 82 70 - 99 mg/dL Final     Urea Nitrogen   Date Value Ref Range Status   07/10/2019 18 7 - 30 mg/dL Final "     Creatinine   Date Value Ref Range Status   07/10/2019 0.54 0.52 - 1.04 mg/dL Final     GFR Estimate   Date Value Ref Range Status   07/10/2019 85 >60 mL/min/[1.73_m2] Final     Comment:     Non  GFR Calc  Starting 12/18/2018, serum creatinine based estimated GFR (eGFR) will be   calculated using the Chronic Kidney Disease Epidemiology Collaboration   (CKD-EPI) equation.       Calcium   Date Value Ref Range Status   07/10/2019 9.0 8.5 - 10.1 mg/dL Final       ASSESSMENT / PLAN:  (I48.91) Atrial fibrillation with rapid ventricular response (H)  (primary encounter diagnosis)  (Z98.890,  Z86.79) S/P ablation of atrial fibrillation  (Z95.0) S/P placement of cardiac pacemaker  Comment: rate controlled. INR slightly below goal. Pacemaker site healing without signs of infection.   Plan:  continue coumadin 4 mg on Tues and Sat, 2 mg the other days of the week. INR 7/18/2019. Follow up Device Clinic 7/17/2019.      (I50.33) Acute on chronic diastolic congestive heart failure (H)  (I27.20) Pulmonary hypertension (H)  (J90) Pleural effusion  Comment: volume status improved. No hypoxia or worsening respiratory symptoms   Plan: continue lasix. Daily weight.Consider repeat thoracentesis if respiratory status declines. Compression stockings during the day. Low sodium diet. BMP and [facility] follow up  7/17/2019.      (C90.00) Multiple myeloma not having achieved remission (H)  Comment: pain controlled. Chemotherapy on hold due to poor functional status.   Plan: continue decadron and percocet. Follow up with Dr Roberts 8/7/2019     (I10) Benign essential hypertension  Comment: hypotension  has improved.   Plan: continue lasix for CHF. Monitor VS. Up with assist.      (R41.89) Cognitive impairment  Comment:  moderate to severe deficits. Requires 24 hr supervision  Plan: CPT  per therapies.  Supportive care      (R53.81) Physical deconditioning  Comment: slowly progressing in therapies  Plan: continue PHYSICAL  THERAPY/OT. Disposition unclear at this time-family is aware she may need a higher level of care than can be managed at home. Care conference per .     Electronically signed by:  DAYSI English CNP               Sincerely,        DAYSI English CNP

## 2019-07-15 NOTE — PROGRESS NOTES
[facility]  [place] Medical Record Number:  [id number]  [facility] of Service where encounter took place[facility] [[id number]]  Chief Complaint   Patient presents with     RECHECK       HPI:    Amira Arreola  is a 86 year old (7/17/1932), who is being seen today for an episodic care visit.  HPI information obtained from: facility chart records, facility staff, patient report and [facility] chart review.   She came to this facility 7/7/2019 for short term rehab and medical management following hospitalization from the Cardiology clinic with hypotension and altered mental status. She was in afib with RVR and initially required diltiazem drip. She underwent ablation with pacemaker placement 7/5/2019. Metoprolol and diltiazem were discontinued. Lasix was held, then resumed at lower dose. CXR with left pleural effusion and she underwent left thoracentesis  7/5/2019 with 250 ml drained. Palliative Care consulted for goals of care and code status was changed to DNR/DNI. Chemotherapy is on hold due to decline in both cognition and functional status.   Calcium level was elevated at 11 on admission,  improved with IVF. Bisphosphonate therapy is on hold.     Today's concern is:     Atrial fibrillation with rapid ventricular response (H)-INR 1.9 today on her usual coumadin dose of 4 mg on Tues and Sat, 2 mg the other days of the week. No bleeding. HR: 78-82   Tired this morning after working with therapy. Reports good appetite. Denies pain of any type, including pacemaker site.   S/P ablation of atrial fibrillation  S/P placement of cardiac pacemaker  Acute on chronic diastolic congestive heart failure (H)-weight unchanged at 133 lbs. Denies cough, shortness of breath or chest pain.   Pulmonary hypertension (H)  Pleural effusion  Multiple myeloma not having achieved remission (H)  Benign essential hypertension-BPs: 130/77, 130/86, 116/68  Cognitive impairment-pleasant and talkative.  Poor historian. Cooperative with cares.SLUMS 8/30,   Physical deconditioning-ambulating up tot 60 ft with walker and contact guard assist. Requires assist of 1 with transfers and cares.     Past Medical and Surgical History reviewed in Epic today.    MEDICATIONS:  Current Outpatient Medications   Medication Sig Dispense Refill     acetaminophen (TYLENOL) 500 MG tablet Take 500 mg by mouth every 6 hours as needed for mild pain        acyclovir (ZOVIRAX) 400 MG tablet Take 1 tablet (400 mg) by mouth 2 times daily 60 tablet 3     Carboxymethylcellulose Sod PF (REFRESH PLUS) 0.5 % SOLN ophthalmic solution 1 drop 4 times daily as needed for dry eyes       dexamethasone (DECADRON) 4 MG tablet Take 20mg (5 tablets) by mouth every week. 28 tablet 3     furosemide (LASIX) 20 MG tablet Take 1 tablet (20 mg) by mouth daily       latanoprost (XALATAN) 0.005 % ophthalmic solution Place 1 drop into the right eye At Bedtime       lidocaine-prilocaine (EMLA) cream Apply to port site 1 hour prior to access 30 g 1     Multiple Vitamins-Minerals (DAILY MULTIVITAMIN) CAPS Take 1 tablet by mouth daily        oxyCODONE-acetaminophen (PERCOCET) 5-325 MG tablet Take 1 tablet by mouth every 4 hours as needed for breakthrough pain 30 tablet 0     polyethylene glycol (MIRALAX/GLYCOLAX) powder Take 17 g by mouth daily as needed for constipation        potassium chloride (KLOR-CON) 20 MEQ packet Take 20 mEq by mouth daily 60 packet 1     prochlorperazine (COMPAZINE) 10 MG tablet Take 1 tablet (10 mg) by mouth every 6 hours as needed for nausea or vomiting 30 tablet 1     SIMBRINZA 1-0.2 % ophthalmic suspension Place 1 drop into the right eye 2 times daily 1 drop AM and PM  2     Sodium Fluoride (SF 5000 PLUS) 1.1 % CREA Apply to affected area 3 times daily       warfarin (COUMADIN) 4 MG tablet Take one tablet (4 mg) by mouth on Tuesdays and Saturdays; take one-half tablet ( 2 mg) on all other days of the week.         REVIEW OF SYSTEMS:  4  "point ROS including Respiratory, CV, GI and , other than that noted in the HPI,  is negative    Objective:  /77   Pulse 82   Temp 97.7  F (36.5  C)   Resp 16   Ht 1.6 m (5' 3\")   Wt 60.3 kg (133 lb)   SpO2 97%   BMI 23.56 kg/m    Exam:  GENERAL APPEARANCE:  Alert, in no distress  ENT:  Mouth and posterior oropharynx normal, moist mucous membranes, [place]  EYES:  EOM normal, conjunctiva and lids normal  NECK:  No adenopathy,masses or thyromegaly  RESP:  respiratory effort and palpation of chest normal, no respiratory distress, diminished breath sounds left base, no crackles. Right side is clear  CV:  Palpation and auscultation of heart done , regular rate and rhythm, no murmur,  +2 pedal pulses, no LE edema   ABDOMEN: soft, nontender, no hepatosplenomegaly or other masses  M/S:   in bed, LOPEZ with good strength. No joint inflammation  SKIN:  pacemaker site clean,dry, intact, no erythema.  No rashes or open areas  PSYCH:  oriented to self, place, situation, insight and judgement impaired, memory impaired , affect and mood normal    Labs:   Hemoglobin   Date Value Ref Range Status   07/10/2019 11.9 11.7 - 15.7 g/dL Final   ]  Last Comprehensive Metabolic Panel:  Sodium   Date Value Ref Range Status   07/10/2019 140 133 - 144 mmol/L Final     Potassium   Date Value Ref Range Status   07/10/2019 5.2 3.4 - 5.3 mmol/L Final     Comment:     Specimen slightly hemolyzed, potassium may be falsely elevated     Chloride   Date Value Ref Range Status   07/10/2019 106 94 - 109 mmol/L Final     Carbon Dioxide   Date Value Ref Range Status   07/10/2019 26 20 - 32 mmol/L Final     Anion Gap   Date Value Ref Range Status   07/10/2019 8 3 - 14 mmol/L Final     Glucose   Date Value Ref Range Status   07/10/2019 82 70 - 99 mg/dL Final     Urea Nitrogen   Date Value Ref Range Status   07/10/2019 18 7 - 30 mg/dL Final     Creatinine   Date Value Ref Range Status   07/10/2019 0.54 0.52 - 1.04 mg/dL Final     GFR Estimate   Date " Value Ref Range Status   07/10/2019 85 >60 mL/min/[1.73_m2] Final     Comment:     Non  GFR Calc  Starting 12/18/2018, serum creatinine based estimated GFR (eGFR) will be   calculated using the Chronic Kidney Disease Epidemiology Collaboration   (CKD-EPI) equation.       Calcium   Date Value Ref Range Status   07/10/2019 9.0 8.5 - 10.1 mg/dL Final       ASSESSMENT / PLAN:  (I48.91) Atrial fibrillation with rapid ventricular response (H)  (primary encounter diagnosis)  (Z98.890,  Z86.79) S/P ablation of atrial fibrillation  (Z95.0) S/P placement of cardiac pacemaker  Comment: rate controlled. INR slightly below goal. Pacemaker site healing without signs of infection.   Plan: continue coumadin 4 mg on Tues and Sat, 2 mg the other days of the week. INR 7/18/2019. Follow up Device Clinic 7/17/2019.      (I50.33) Acute on chronic diastolic congestive heart failure (H)  (I27.20) Pulmonary hypertension (H)  (J90) Pleural effusion  Comment: volume status improved. No hypoxia or worsening respiratory symptoms   Plan: continue lasix. Daily weight.Consider repeat thoracentesis if respiratory status declines. Compression stockings during the day. Low sodium diet. BMP and [facility] Clinic follow up 7/17/2019.      (C90.00) Multiple myeloma not having achieved remission (H)  Comment: pain controlled. Chemotherapy on hold due to poor functional status.   Plan: continue decadron and percocet. Follow up with Dr Roberts 8/7/2019     (I10) Benign essential hypertension  Comment: hypotension has improved.   Plan: continue lasix for CHF. Monitor VS. Up with assist.      (R41.89) Cognitive impairment  Comment: moderate to severe deficits. Requires 24 hr supervision  Plan: CPT per therapies.  Supportive care      (R53.81) Physical deconditioning  Comment: slowly progressing in therapies  Plan: continue PHYSICAL THERAPY/OT. Disposition unclear at this time-family is aware she may need a higher level of care than can be managed at  home. Care conference per .     Electronically signed by:  DAYSI English CNP

## 2019-07-16 ENCOUNTER — HOSPITAL LABORATORY (OUTPATIENT)
Dept: OTHER | Facility: CLINIC | Age: 84
End: 2019-07-16

## 2019-07-16 LAB
ANION GAP SERPL CALCULATED.3IONS-SCNC: 10 MMOL/L (ref 3–14)
BUN SERPL-MCNC: 21 MG/DL (ref 7–30)
CALCIUM SERPL-MCNC: 9.1 MG/DL (ref 8.5–10.1)
CHLORIDE SERPL-SCNC: 110 MMOL/L (ref 94–109)
CO2 SERPL-SCNC: 21 MMOL/L (ref 20–32)
CREAT SERPL-MCNC: 0.54 MG/DL (ref 0.52–1.04)
GFR SERPL CREATININE-BSD FRML MDRD: 85 ML/MIN/{1.73_M2}
GLUCOSE SERPL-MCNC: 170 MG/DL (ref 70–99)
POTASSIUM SERPL-SCNC: 4.2 MMOL/L (ref 3.4–5.3)
SODIUM SERPL-SCNC: 141 MMOL/L (ref 133–144)

## 2019-07-17 ENCOUNTER — OFFICE VISIT (OUTPATIENT)
Dept: CARDIOLOGY | Facility: CLINIC | Age: 84
End: 2019-07-17
Payer: MEDICARE

## 2019-07-17 ENCOUNTER — ANCILLARY PROCEDURE (OUTPATIENT)
Dept: CARDIOLOGY | Facility: CLINIC | Age: 84
End: 2019-07-17
Attending: INTERNAL MEDICINE
Payer: MEDICARE

## 2019-07-17 VITALS
BODY MASS INDEX: 22.24 KG/M2 | WEIGHT: 133.5 LBS | HEART RATE: 80 BPM | SYSTOLIC BLOOD PRESSURE: 118 MMHG | DIASTOLIC BLOOD PRESSURE: 72 MMHG | OXYGEN SATURATION: 97 % | HEIGHT: 65 IN

## 2019-07-17 DIAGNOSIS — I10 BENIGN ESSENTIAL HYPERTENSION: ICD-10-CM

## 2019-07-17 DIAGNOSIS — I07.1 TRICUSPID VALVE INSUFFICIENCY, UNSPECIFIED ETIOLOGY: ICD-10-CM

## 2019-07-17 DIAGNOSIS — I50.32 CHRONIC HEART FAILURE WITH PRESERVED EJECTION FRACTION (H): Primary | ICD-10-CM

## 2019-07-17 DIAGNOSIS — I48.91 ATRIAL FIBRILLATION WITH RAPID VENTRICULAR RESPONSE (H): ICD-10-CM

## 2019-07-17 DIAGNOSIS — I34.0 MITRAL VALVE INSUFFICIENCY, UNSPECIFIED ETIOLOGY: ICD-10-CM

## 2019-07-17 DIAGNOSIS — E78.5 HYPERLIPIDEMIA LDL GOAL <160: ICD-10-CM

## 2019-07-17 DIAGNOSIS — Z95.0 CARDIAC PACEMAKER IN SITU: ICD-10-CM

## 2019-07-17 PROCEDURE — 99214 OFFICE O/P EST MOD 30 MIN: CPT | Performed by: NURSE PRACTITIONER

## 2019-07-17 PROCEDURE — 93279 PRGRMG DEV EVAL PM/LDLS PM: CPT | Performed by: INTERNAL MEDICINE

## 2019-07-17 ASSESSMENT — MIFFLIN-ST. JEOR: SCORE: 1041.43

## 2019-07-17 NOTE — PROGRESS NOTES
Cardiology Clinic Progress Note  Amira Arreola MRN# [id number]   YOB: 1932 Age: 87 year old   Primary Cardiologist: Dr. Rivera Reason for visit: CORE follow up/post-hosp followup            Assessment and Plan:   Amira Arreola is a very pleasant 86 year old female with a history of HFpEF, chronic atrial fibrillation s/p AV solomon ablation with PPM implantation 7/5/19, hypertension, mitral regurgitation, tricuspid regurgitation and hx of multiple myeloma mets to bone (dx 2016, currently being treated with Daratumab, Velcade, and Dexamethasone every 2 weeks). Patient with 2 recent hospitalizations, 7/3/19-7/7/19 and  6/28/19-7/1/19, see below for details. Patient here today for [facility] follow up/post hospital follow up.    1.  Chronic diastolic heart failure/HFpEF - Echocardiogram completed 3/23/19 LVEF 55-60%, no wall motion abnormalities, moderate mitral regurgitation, moderate tricuspid regurgitation, and severe pulmonary hypertension. Weight today 131#, which has been stable since hospital discharge. Patient appears compensated and euvolemic. Atrial fibrillation with RVR was likely exacerbating HF, significant improvement in symptoms with heart rate control with AV solomon ablation. During most recent hospitalization her furosemide was decreased which she has been tolerating well.    - NYHA class III, stage C   - Fluid status : euvolemic   - Diuretic regimen : furosemide 20mg daily   - Aldosterone antagonist : none   - Blood pressure : controlled   - Reinforced HF education, reviewed low sodium diet and reading labels today.    - Weight currently being monitored at facility, reviewed if/when discharged home the importance of monitoring weight at home, reviewed when to notify clinic.     2. Chronic atrial fibrillation - s/p AV solomon ablation with PPM implantation 7/5/19, stable, significant improvement in symptoms since ablation and heart rate control achieved.    - [id number] score 5 (HTN, HF, age,  female)   - Continue warfarin for thromboembolic prophylaxis.     3. Moderate mitral regurgitation, moderate tricuspid regurgitation   - Will consider repeat echocardiogram in the future when patient is euvolemic or if worsening symptoms, currently won't change any management clinically.      4. Severe pulmonary hypertension - likely secondary to HFpEF and likely improved with diuresis.    - Will consider repeat echocardiogram in the future when patient is euvolemic.     5. Multiple myeloma with mets to bone - follows with Dr. Roberts   - Chemo therapy currently on hold, plan for follow up with Dr. Roberts in August to reassess.     7. Advance care planning - patient and family met with palliative care while hospitalized, code status changed to DNR/DNI, patient still wishing to seek restorative care. Patient and family have a reasonable understanding of her current health status. Patient has very supportive/involved family.    - Health care directive completed, electing her daughter Eufemia as POA.    - Reviewed that palliative care is available in the clinic for further support in the future. Patient and daughter note they will reach out if interested in meeting with Dr. Farris.    - Patient currently residing in [place], with hopes to get home.     Changes today: none    Follow up plan:     Follow up with [facility] in 6 weeks        History of Presenting Illness:    Amira Arreola is a very pleasant 86 year old female with a history of HFpEF, chronic atrial fibrillation, hypertension, mitral regurgitation, tricuspid regurgitation and hx of multiple myeloma mets to bone (dx 2016, currently being treated with Daratumab, Velcade, and Dexamethasone every 2 weeks).     Patient with 2 recent hospitalizations 7/3/19-7/7/19 presenting from the cardiology office with hypotension and altered mental status felt to be related to hypovolemia and hypercalcemia. Patient was in atrial fibrillation with RVR. Serial troponin negative. Unable  to tolerate rate control medications due to hypotension s/p AV solomon ablation with PPM 7/5/19. Furosemide on hold during most of hospital stay due to concerns of hypovolemia, resumed at lower dosage 20mg at discharge. S/p thoracentesis 7/5 250cc removed, transudative. Palliative care met with patient/family during hospital stay, code status changed to DNR/DNI but family still wishing to receive restorative care. Discharge weight 131#. Patient discharged to [facility]. 6/28/19-7/1/19 related to atrial fibrillation with RVR, acute on chronic diastolic heart failure and bilateral pleural effusions. BNP 13K on admission, 2K at time of discharge. Diltiazem increased to 180mg/day. Prior to this patient was hospitalized in March, see below for details.     Echocardiogram completed 3/23/19 LVEF 55-60%, no wall motion abnormalities, moderate mitral regurgitation, moderate tricuspid regurgitation, and severe pulmonary hypertension.     Patient was last seen in [facility] clinic by Dr. Rivera on 7/3/19 at which time patient was noted to by hypotensive (60s/40s), worsening confusion/drowsiness. Hospital admission was recommended.     Patient is here today for CORE followup. Daughter present for clinic visit. Patient currently at [facility], hope is to get back home. Weights being monitored daily at facility. Has been ranging 133-131#, which is stable since hospital discharge. Weight today 131#. Patient notes improved lower extremity edema. Light compression stockings getting placed daily. Denies abdominal distention/bloating. Denies shortness of breath at rest. Occasional exertional dyspnea with walking, notes she will take breaks. Sleeping good. Denies orthopnea or PND. Patient denies chest pain or chest tightness. Denies dizziness, lightheadedness or other presyncopal symptoms. Denies tachycardia or palpitations. Denies falls. Denies bleeding episodes. Some complaints of chronic back pain, no changes or worsening. Chemo therapy was stopped  until August, wanted to give her a break due to fatigue, plan to meet with Dr. Roberts on 8/7/19 to re-evaluate.     Hope is to get back home, she had homemaker services 3 x a week for 4 hours helping cleaning and bathing. Daughter setting up pill boxes. Daughter notes they will be re-evaluating home services at time of transition home. Recognizing that patient may need increased services.     Labs from yesterday show stable kidney function and electrolytes. Blood pressure 118/72 and HR 80.    Appetite is good. Eating meals at [facility] currently. When at home son helps with dinner. Not adding salt to foods. Patient reports drinking < 2L daily. No tobacco or alcohol use. Working with PT/OT at facility. Using a walker/wheelchair for assitance.         Recent Hospitalizations   7/3/19-7/7/19 presenting from the cardiology office with hypotension and altered mental status felt to be related to hypovolemia and hypercalcemia. Patient was in atrial fibrillation with RVR. Serial troponin negative. Unable to tolerate rate control medications due to hypotension s/p AV solomon ablation with PPM 7/5/19. Furosemide on hold during most of hospital stay due to concerns of hypovolemia, resumed at lower dosage 20mg at discharge. S/p thoracentesis 7/5 250cc removed, transudative. Palliative care met with patient/family during hospital stay, still wishing to receive restorative care.   6/28/19-7/1/19 related to atrial fibrillation with RVR, acute on chronic diastolic heart failure and bilateral pleural effusions. BNP 13K on admission, 2K at time of discharge. Diltiazem increased to 180mg/day.  3/22/19-3/28/19 presented with dyspnea, leg swelling and intermittent palpitations. Patient was found to have atrial fibrillation with RVR and acute diastolic heart failure exacerbation. Weight 3/24/19 147# (doesn't appear admission weight was completed). Discharge weight 135#.        Social History    , 6 children, Enjoys keeping the house  clean and orderly. Retired nurse.   Social History     Socioeconomic History     Marital status:      Spouse name: Tom     Number of children: 6     Years of education: Not on file     Highest education level: Not on file   Occupational History     Occupation: rn anesthetist     Employer: RETIRED   Social Needs     Financial resource strain: Not on file     Food insecurity:     Worry: Not on file     Inability: Not on file     Transportation needs:     Medical: Not on file     Non-medical: Not on file   Tobacco Use     Smoking status: Never Smoker     Smokeless tobacco: Never Used   Substance and Sexual Activity     Alcohol use: No     Alcohol/week: 0.0 oz     Drug use: No     Sexual activity: Never   Lifestyle     Physical activity:     Days per week: Not on file     Minutes per session: Not on file     Stress: Not on file   Relationships     Social connections:     Talks on phone: Not on file     Gets together: Not on file     Attends Holiness service: Not on file     Active member of club or organization: Not on file     Attends meetings of clubs or organizations: Not on file     Relationship status: Not on file     Intimate partner violence:     Fear of current or ex partner: Not on file     Emotionally abused: Not on file     Physically abused: Not on file     Forced sexual activity: Not on file   Other Topics Concern     Parent/sibling w/ CABG, MI or angioplasty before 65F 55M? Not Asked   Social History Narrative     Not on file            Review of Systems:   Skin:  Positive for bruising   Eyes:  Positive for glaucoma  ENT:  Negative    Respiratory:  Negative    Cardiovascular:    fatigue;Positive for  Gastroenterology: Negative    Genitourinary:  Negative    Musculoskeletal:  Positive for arthritis  Neurologic:  Positive for local weakness;incoordination  Psychiatric:  Negative    Heme/Lymph/Imm:  Positive for allergies  Endocrine:  Negative           Physical Exam:   Vitals: /72   Pulse 80  "  Ht 1.651 m (5' 5\")   Wt 60.6 kg (133 lb 8 oz)   SpO2 97%   BMI 22.22 kg/m     Wt Readings from Last 4 Encounters:   07/17/19 60.6 kg (133 lb 8 oz)   07/15/19 60.3 kg (133 lb)   07/11/19 61 kg (134 lb 6.4 oz)   07/09/19 60.4 kg (133 lb 1.6 oz)     GEN: frail, elderly  HEENT:  Pupils equal, round. Sclerae nonicteric.   NECK: Supple, no masses appreciated. JVP appears normal  C/V:  Irregularly irregular rate and rhythm, no murmur, rub or gallop.   RESP: Respirations are unlabored. Clear bilaterally. Diminished in bases.   GI: Abdomen distended, nontender.  EXTREM: Trace bilateral lower extremity edema  NEURO: Alert and cooperative.  SKIN: Warm and dry.        Data:   ECHO 3/23/19  The left ventricle is normal in size.  The visual ejection fraction is estimated at 55-60%.  No regional wall motion abnormalities noted.  There is moderate (2+) mitral regurgitation.  There is moderate (2+) tricuspid regurgitation.  Severe (>55mmHg) pulmonary hypertension is present.  The rhythm was rapid atrial fibrillation.    LIPID RESULTS:  Lab Results   Component Value Date    CHOL 160 10/12/2016    HDL 76 10/12/2016    LDL 71 10/12/2016    TRIG 66 10/12/2016    CHOLHDLRATIO 2.3 09/21/2015     LIVER ENZYME RESULTS:  Lab Results   Component Value Date    AST 22 07/03/2019    ALT 32 07/03/2019     CBC RESULTS:  Lab Results   Component Value Date    WBC 7.9 07/06/2019    RBC 3.30 (L) 07/06/2019    HGB 11.9 07/10/2019    HCT 31.8 (L) 07/06/2019    MCV 96 07/06/2019    MCH 31.8 07/06/2019    MCHC 33.0 07/06/2019    RDW 18.2 (H) 07/06/2019     07/06/2019     BMP RESULTS:  Lab Results   Component Value Date     07/16/2019    POTASSIUM 4.2 07/16/2019    CHLORIDE 110 (H) 07/16/2019    CO2 21 07/16/2019    ANIONGAP 10 07/16/2019     (H) 07/16/2019    BUN 21 07/16/2019    CR 0.54 07/16/2019    GFRESTIMATED 85 07/16/2019    GFRESTBLACK >90 07/16/2019    BRADLEY 9.1 07/16/2019      INR RESULTS:  Lab Results   Component Value " Date    INR 1.61 (H) 07/07/2019    INR 1.36 (H) 07/06/2019            Medications     Current Outpatient Medications   Medication Sig Dispense Refill     acetaminophen (TYLENOL) 500 MG tablet Take 500 mg by mouth every 6 hours as needed for mild pain        acyclovir (ZOVIRAX) 400 MG tablet Take 1 tablet (400 mg) by mouth 2 times daily 60 tablet 3     Carboxymethylcellulose Sod PF (REFRESH PLUS) 0.5 % SOLN ophthalmic solution 1 drop 4 times daily as needed for dry eyes       dexamethasone (DECADRON) 4 MG tablet Take 20mg (5 tablets) by mouth every week. 28 tablet 3     furosemide (LASIX) 20 MG tablet Take 1 tablet (20 mg) by mouth daily       latanoprost (XALATAN) 0.005 % ophthalmic solution Place 1 drop into the right eye At Bedtime       lidocaine-prilocaine (EMLA) cream Apply to port site 1 hour prior to access 30 g 1     Multiple Vitamins-Minerals (DAILY MULTIVITAMIN) CAPS Take 1 tablet by mouth daily        oxyCODONE-acetaminophen (PERCOCET) 5-325 MG tablet Take 1 tablet by mouth every 4 hours as needed for breakthrough pain 30 tablet 0     polyethylene glycol (MIRALAX/GLYCOLAX) powder Take 17 g by mouth daily as needed for constipation        potassium chloride (KLOR-CON) 20 MEQ packet Take 20 mEq by mouth daily 60 packet 1     prochlorperazine (COMPAZINE) 10 MG tablet Take 1 tablet (10 mg) by mouth every 6 hours as needed for nausea or vomiting 30 tablet 1     SIMBRINZA 1-0.2 % ophthalmic suspension Place 1 drop into the right eye 2 times daily 1 drop AM and PM  2     Sodium Fluoride (SF 5000 PLUS) 1.1 % CREA Apply to affected area 3 times daily       warfarin (COUMADIN) 4 MG tablet Take one tablet (4 mg) by mouth on Tuesdays and Saturdays; take one-half tablet ( 2 mg) on all other days of the week.            Past Medical History     Past Medical History:   Diagnosis Date     Abnormal CXR 2018    then ct done and not significant     Acute diastolic heart failure (H) 03/22/2019    nl ef, 2+mr and tr with  severe pulm htn     Ascending aorta dilatation (H) 2016    on echo, mild, fu  4.0, slightly larger     Cancer, metastatic to bone (H)     due to myeloma     Colonic polyp     adenomatous, fu  tics only     Compression fracture     multiple areas of spine     Dry eyes      Elevated MCV     b12 and folic acid nl     HTN (hypertension)     off meds for years     Lung nodule 2018    on ct, 4mm, ct done for fu abnl cxr     Menorrhagia     hysteroscopy and d and c done     MGUS (monoclonal gammopathy of unknown significance)     eval by Dr. Roberts     Multiple myeloma (H) 2016    dx  at [place], bone lesions seen on mri      OAB (overactive bladder)     Dr. Grullon     Osteoporosis     fu done  and stable, went off meds then, fu done ; has had gyn fu and added evista  by gyn     Palpitations     nl echo, mildly dilated asc aorta     Paroxysmal atrial fibrillation (H)     had palp and ziopatch showed it, echo nl lv fxn, mild mr and tr, added coum and toprol, toprol dose raised 16     Pulmonary hypertension (H) 2019    seen on echo     Sciatica of left side     Dr. Helen Ricardo 2004     SVT (supraventricular tachycardia) (H)     on ziopatch     Thrombocytopenia (H)      Past Surgical History:   Procedure Laterality Date     BONE MARROW BIOPSY, BONE SPECIMEN, NEEDLE/TROCAR N/A 2016    Procedure: BIOPSY BONE MARROW;  Surgeon: Bryan Patel MD;  Location:  GI     CATARACT IOL, RT/LT        SECTION  1965, 1966     COLONOSCOPY  2013    Procedure: COLONOSCOPY;  COLONOSCOPY;  Surgeon: Steffany Rockwell MD;  Location:  GI     EP ABLATION AV NODE N/A 2019    Procedure: EP Ablation AV Node;  Surgeon: Lee Menchaca MD;  Location:  HE[facility]ATH LAB     EP PACEMAKER N/A 2019    Procedure: EP Pacemaker;  Surgeon: Lee Menchaca MD;  Location:  HEART [facility]ARD[facility] CATH LAB     EXCISE  EXOSTOSIS TIBIA / FIBULA  6/30/2014    Procedure: EXCISE EXOSTOSIS TIBIA / FIBULA;  Surgeon: Naila Pichardo MD;  Location:  SD     hysteroscopy and d and c  2002    due to bleeding     left anle replacement  2007     right ankle surgery  2003     Family History   Problem Relation Age of Onset     Heart Disease Father      C.A.D. Mother      Cerebrovascular Disease Brother      Family History Negative Sister      Family History Negative Sister      Family History Negative Brother             Allergies   Blood transfusion related (informational only) and Penicillin [penicillins]        DAY[facility] Arellano [facility]  Pager: [phone]

## 2019-07-17 NOTE — PATIENT INSTRUCTIONS
Call C[facility] nurse for any questions or concerns Mon-Fri 8am-4pm:                                                #[phone]                                       For concerns after hours:                                               #[phone]     1: Medication changes: None  2: Plan from today: continue monitoring weight daily, follow up with [facility] in 6 weeks.   3: Lab results: see attached; stable kidney function and electrolytes.   Component      Latest Ref Rng & Units 7/10/2019 7/16/2019   Sodium      133 - 144 mmol/L 140 141   Potassium      3.4 - 5.3 mmol/L 5.2 4.2   Chloride      94 - 109 mmol/L 106 110 (H)   Carbon Dioxide      20 - 32 mmol/L 26 21   Anion Gap      3 - 14 mmol/L 8 10   Glucose      70 - 99 mg/dL 82 170 (H)   Urea Nitrogen      7 - 30 mg/dL 18 21   Creatinine      0.52 - 1.04 mg/dL 0.54 0.54   GFR Estimate      >60 mL/min/1.73:m2 85 85   GFR Estimate If Black      >60 mL/min/1.73:m2 >90 >90   Calcium      8.5 - 10.1 mg/dL 9.0 9.1

## 2019-07-17 NOTE — LETTER
7/17/2019    Addy Frias MD  [address]  [place] [address]    RE: Amira Arreola       Dear Colleague,    I had the pleasure of seeing Amira Arreola in the [facility].    Cardiology Clinic Progress Note  Amira Arreola MRN# [id number]   YOB: 1932 Age: 87 year old   Primary Cardiologist: Dr. Rivera Reason for visit: CORE follow up/post-hosp followup            Assessment and Plan:   Amira Arreola is a very pleasant 86 year old female with a history of HFpEF, chronic atrial fibrillation s/p AV solomon ablation with PPM implantation 7/5/19, hypertension, mitral regurgitation, tricuspid regurgitation and hx of multiple myeloma mets to bone (dx 2016, currently being treated with Daratumab, Velcade, and Dexamethasone every 2 weeks). Patient with 2 recent hospitalizations, 7/3/19-7/7/19 and  6/28/19-7/1/19, see below for details. Patient here today for CORE follow up/post hospital follow up.    1.  Chronic diastolic heart failure/HFpEF - Echocardiogram completed 3/23/19 LVEF 55-60%, no wall motion abnormalities, moderate mitral regurgitation, moderate tricuspid regurgitation, and severe pulmonary hypertension. Weight today 131#, which has been stable since hospital discharge. Patient appears compensated and euvolemic. Atrial fibrillation with RVR was likely exacerbating HF, significant improvement in symptoms with heart rate control with AV solomon ablation. During most recent hospitalization her furosemide was decreased which she has been tolerating well.    - NYHA class III, stage C   - Fluid status : euvolemic   - Diuretic regimen : furosemide 20mg daily   - Aldosterone antagonist : none   - Blood pressure : controlled   - Reinforced HF education, reviewed low sodium diet and reading labels today.    - Weight currently being monitored at facility, reviewed if/when discharged home the importance of monitoring weight at home, reviewed when to notify clinic.      2. Chronic atrial fibrillation - s/p AV solomon ablation with PPM implantation 7/5/19, stable, significant improvement in symptoms since ablation and heart rate control achieved.    - [id number] score 5 (HTN, HF, age, female)   - Continue warfarin for thromboembolic prophylaxis.     3. Moderate mitral regurgitation, moderate tricuspid regurgitation   - Will consider repeat echocardiogram in the future when patient is euvolemic or if worsening symptoms, currently won't change any management clinically.      4. Severe pulmonary hypertension - likely secondary to HFpEF and likely improved with diuresis.    - Will consider repeat echocardiogram in the future when patient is euvolemic.     5. Multiple myeloma with mets to bone - follows with Dr. Roberts   - Chemo therapy currently on hold, plan for follow up with Dr. Roberts in August to reassess.     7. Advance care planning - patient and family met with palliative care while hospitalized, code status changed to DNR/DNI, patient still wishing to seek restorative care. Patient and family have a reasonable understanding of her current health status. Patient has very supportive/involved family.    - Health care directive completed, electing her daughter Eufemia as POA.    - Reviewed that palliative care is available in the clinic for further support in the future. Patient and daughter note they will reach out if interested in meeting with Dr. Farris.    - Patient currently residing in [place], with hopes to get home.     Changes today: none    Follow up plan:     Follow up with CORE in 6 weeks        History of Presenting Illness:    Amira Arreola is a very pleasant 86 year old female with a history of HFpEF, chronic atrial fibrillation, hypertension, mitral regurgitation, tricuspid regurgitation and hx of multiple myeloma mets to bone (dx 2016, currently being treated with Daratumab, Velcade, and Dexamethasone every 2 weeks).     Patient with 2 recent hospitalizations  7/3/19-7/7/19 presenting from the cardiology office with hypotension and altered mental status felt to be related to hypovolemia and hypercalcemia. Patient was in atrial fibrillation with RVR. Serial troponin negative. Unable to tolerate rate control medications due to hypotension s/p AV solomon ablation with PPM 7/5/19. Furosemide on hold during most of hospital stay due to concerns of hypovolemia, resumed at lower dosage 20mg at discharge. S/p thoracentesis 7/5 250cc removed, transudative. Palliative care met with patient/family during hospital stay, code status changed to DNR/DNI but family still wishing to receive restorative care. Discharge weight 131#. Patient discharged to [facility]. 6/28/19-7/1/19 related to atrial fibrillation with RVR, acute on chronic diastolic heart failure and bilateral pleural effusions. BNP 13K on admission, 2K at time of discharge. Diltiazem increased to 180mg/day. Prior to this patient was hospitalized in March, see below for details.     Echocardiogram completed 3/23/19 LVEF 55-60%, no wall motion abnormalities, moderate mitral regurgitation, moderate tricuspid regurgitation, and severe pulmonary hypertension.     Patient was last seen in CORE clinic by Dr. Rivera on 7/3/19 at which time patient was noted to by hypotensive (60s/40s), worsening confusion/drowsiness. Hospital admission was recommended.     Patient is here today for CORE followup. Daughter present for clinic visit. Patient currently at [facility], hope is to get back home. Weights being monitored daily at facility. Has been ranging 133-131#, which is stable since hospital discharge. Weight today 131#. Patient notes improved lower extremity edema. Light compression stockings getting placed daily. Denies abdominal distention/bloating. Denies shortness of breath at rest. Occasional exertional dyspnea with walking, notes she will take breaks. Sleeping good. Denies orthopnea or PND. Patient denies chest pain or chest tightness.  Denies dizziness, lightheadedness or other presyncopal symptoms. Denies tachycardia or palpitations. Denies falls. Denies bleeding episodes. Some complaints of chronic back pain, no changes or worsening. Chemo therapy was stopped until August, wanted to give her a break due to fatigue, plan to meet with Dr. Roberts on 8/7/19 to re-evaluate.     Hope is to get back home, she had homemaker services 3 x a week for 4 hours helping cleaning and bathing. Daughter setting up pill boxes. Daughter notes they will be re-evaluating home services at time of transition home. Recognizing that patient may need increased services.     Labs from yesterday show stable kidney function and electrolytes. Blood pressure 118/72 and HR 80.    Appetite is good. Eating meals at [place] currently. When at home son helps with dinner. Not adding salt to foods. Patient reports drinking < 2L daily. No tobacco or alcohol use. Working with PT/OT at facility. Using a walker/wheelchair for assitance.         Recent Hospitalizations   7/3/19-7/7/19 presenting from the cardiology office with hypotension and altered mental status felt to be related to hypovolemia and hypercalcemia. Patient was in atrial fibrillation with RVR. Serial troponin negative. Unable to tolerate rate control medications due to hypotension s/p AV solomon ablation with PPM 7/5/19. Furosemide on hold during most of hospital stay due to concerns of hypovolemia, resumed at lower dosage 20mg at discharge. S/p thoracentesis 7/5 250cc removed, transudative. Palliative care met with patient/family during hospital stay, still wishing to receive restorative care.   6/28/19-7/1/19 related to atrial fibrillation with RVR, acute on chronic diastolic heart failure and bilateral pleural effusions. BNP 13K on admission, 2K at time of discharge. Diltiazem increased to 180mg/day.  3/22/19-3/28/19 presented with dyspnea, leg swelling and intermittent palpitations. Patient was found to have atrial  fibrillation with RVR and acute diastolic heart failure exacerbation. Weight 3/24/19 147# (doesn't appear admission weight was completed). Discharge weight 135#.        Social History    , 6 children, Enjoys keeping the house clean and orderly. Retired nurse.   Social History     Socioeconomic History     Marital status:      Spouse name: Tom     Number of children: 6     Years of education: Not on file     Highest education level: Not on file   Occupational History     Occupation: rn anesthetist     Employer: RETIRED   Social Needs     Financial resource strain: Not on file     Food insecurity:     Worry: Not on file     Inability: Not on file     Transportation needs:     Medical: Not on file     Non-medical: Not on file   Tobacco Use     Smoking status: Never Smoker     Smokeless tobacco: Never Used   Substance and Sexual Activity     Alcohol use: No     Alcohol/week: 0.0 oz     Drug use: No     Sexual activity: Never   Lifestyle     Physical activity:     Days per week: Not on file     Minutes per session: Not on file     Stress: Not on file   Relationships     Social connections:     Talks on phone: Not on file     Gets together: Not on file     Attends Confucianism service: Not on file     Active member of club or organization: Not on file     Attends meetings of clubs or organizations: Not on file     Relationship status: Not on file     Intimate partner violence:     Fear of current or ex partner: Not on file     Emotionally abused: Not on file     Physically abused: Not on file     Forced sexual activity: Not on file   Other Topics Concern     Parent/sibling w/ CABG, MI or angioplasty before 65F 55M? Not Asked   Social History Narrative     Not on file            Review of Systems:   Skin:  Positive for bruising   Eyes:  Positive for glaucoma  ENT:  Negative    Respiratory:  Negative    Cardiovascular:    fatigue;Positive for  Gastroenterology: Negative    Genitourinary:  Negative   "  Musculoskeletal:  Positive for arthritis  Neurologic:  Positive for local weakness;incoordination  Psychiatric:  Negative    Heme/Lymph/Imm:  Positive for allergies  Endocrine:  Negative           Physical Exam:   Vitals: /72   Pulse 80   Ht 1.651 m (5' 5\")   Wt 60.6 kg (133 lb 8 oz)   SpO2 97%   BMI 22.22 kg/m      Wt Readings from Last 4 Encounters:   07/17/19 60.6 kg (133 lb 8 oz)   07/15/19 60.3 kg (133 lb)   07/11/19 61 kg (134 lb 6.4 oz)   07/09/19 60.4 kg (133 lb 1.6 oz)     GEN: frail, elderly  HEENT:  Pupils equal, round. Sclerae nonicteric.   NECK: Supple, no masses appreciated. JVP appears normal  C/V:  Irregularly irregular rate and rhythm, no murmur, rub or gallop.   RESP: Respirations are unlabored. Clear bilaterally. Diminished in bases.   GI: Abdomen distended, nontender.  EXTREM: Trace bilateral lower extremity edema  NEURO: Alert and cooperative.  SKIN: Warm and dry.        Data:   ECHO 3/23/19  The left ventricle is normal in size.  The visual ejection fraction is estimated at 55-60%.  No regional wall motion abnormalities noted.  There is moderate (2+) mitral regurgitation.  There is moderate (2+) tricuspid regurgitation.  Severe (>55mmHg) pulmonary hypertension is present.  The rhythm was rapid atrial fibrillation.    LIPID RESULTS:  Lab Results   Component Value Date    CHOL 160 10/12/2016    HDL 76 10/12/2016    LDL 71 10/12/2016    TRIG 66 10/12/2016    CHOLHDLRATIO 2.3 09/21/2015     LIVER ENZYME RESULTS:  Lab Results   Component Value Date    AST 22 07/03/2019    ALT 32 07/03/2019     CBC RESULTS:  Lab Results   Component Value Date    WBC 7.9 07/06/2019    RBC 3.30 (L) 07/06/2019    HGB 11.9 07/10/2019    HCT 31.8 (L) 07/06/2019    MCV 96 07/06/2019    MCH 31.8 07/06/2019    MCHC 33.0 07/06/2019    RDW 18.2 (H) 07/06/2019     07/06/2019     BMP RESULTS:  Lab Results   Component Value Date     07/16/2019    POTASSIUM 4.2 07/16/2019    CHLORIDE 110 (H) 07/16/2019    " CO2 21 07/16/2019    ANIONGAP 10 07/16/2019     (H) 07/16/2019    BUN 21 07/16/2019    CR 0.54 07/16/2019    GFRESTIMATED 85 07/16/2019    GFRESTBLACK >90 07/16/2019    BRADLEY 9.1 07/16/2019      INR RESULTS:  Lab Results   Component Value Date    INR 1.61 (H) 07/07/2019    INR 1.36 (H) 07/06/2019            Medications     Current Outpatient Medications   Medication Sig Dispense Refill     acetaminophen (TYLENOL) 500 MG tablet Take 500 mg by mouth every 6 hours as needed for mild pain        acyclovir (ZOVIRAX) 400 MG tablet Take 1 tablet (400 mg) by mouth 2 times daily 60 tablet 3     Carboxymethylcellulose Sod PF (REFRESH PLUS) 0.5 % SOLN ophthalmic solution 1 drop 4 times daily as needed for dry eyes       dexamethasone (DECADRON) 4 MG tablet Take 20mg (5 tablets) by mouth every week. 28 tablet 3     furosemide (LASIX) 20 MG tablet Take 1 tablet (20 mg) by mouth daily       latanoprost (XALATAN) 0.005 % ophthalmic solution Place 1 drop into the right eye At Bedtime       lidocaine-prilocaine (EMLA) cream Apply to port site 1 hour prior to access 30 g 1     Multiple Vitamins-Minerals (DAILY MULTIVITAMIN) CAPS Take 1 tablet by mouth daily        oxyCODONE-acetaminophen (PERCOCET) 5-325 MG tablet Take 1 tablet by mouth every 4 hours as needed for breakthrough pain 30 tablet 0     polyethylene glycol (MIRALAX/GLYCOLAX) powder Take 17 g by mouth daily as needed for constipation        potassium chloride (KLOR-CON) 20 MEQ packet Take 20 mEq by mouth daily 60 packet 1     prochlorperazine (COMPAZINE) 10 MG tablet Take 1 tablet (10 mg) by mouth every 6 hours as needed for nausea or vomiting 30 tablet 1     SIMBRINZA 1-0.2 % ophthalmic suspension Place 1 drop into the right eye 2 times daily 1 drop AM and PM  2     Sodium Fluoride (SF 5000 PLUS) 1.1 % CREA Apply to affected area 3 times daily       warfarin (COUMADIN) 4 MG tablet Take one tablet (4 mg) by mouth on Tuesdays and Saturdays; take one-half tablet ( 2 mg)  on all other days of the week.            Past Medical History     Past Medical History:   Diagnosis Date     Abnormal CXR 2018    then ct done and not significant     Acute diastolic heart failure (H) 2019    nl ef, 2+mr and tr with severe pulm htn     Ascending aorta dilatation (H) 2016    on echo, mild, fu  4.0, slightly larger     Cancer, metastatic to bone (H)     due to myeloma     Colonic polyp     adenomatous, fu  tics only     Compression fracture     multiple areas of spine     Dry eyes      Elevated MCV     b12 and folic acid nl     HTN (hypertension)     off meds for years     Lung nodule 2018    on ct, 4mm, ct done for fu abnl cxr     Menorrhagia     hysteroscopy and d and c done     MGUS (monoclonal gammopathy of unknown significance)     eval by Dr. Roberts     Multiple myeloma (H) 2016    dx  at [place], bone lesions seen on mri      OAB (overactive bladder)     Dr. Grullon     Osteoporosis     fu done  and stable, went off meds then, fu done ; has had gyn fu and added evista  by gyn     Palpitations     nl echo, mildly dilated asc aorta     Paroxysmal atrial fibrillation (H)     had palp and ziopatch showed it, echo nl lv fxn, mild mr and tr, added coum and toprol, toprol dose raised 16     Pulmonary hypertension (H) 2019    seen on echo     Sciatica of left side     Dr. Helen Ricardo 2004     SVT (supraventricular tachycardia) (H)     on ziopatch     Thrombocytopenia (H)      Past Surgical History:   Procedure Laterality Date     BONE MARROW BIOPSY, BONE SPECIMEN, NEEDLE/TROCAR N/A 2016    Procedure: BIOPSY BONE MARROW;  Surgeon: Bryan Patel MD;  Location:  GI     CATARACT IOL, RT/LT        SECTION  1965, 1966     COLONOSCOPY  2013    Procedure: COLONOSCOPY;  COLONOSCOPY;  Surgeon: Steffany Rockwell MD;  Location:  GI     EP ABLATION AV NODE N/A 2019     Procedure: EP Ablation AV Node;  Surgeon: Lee Menchaca MD;  Location:  HEART CARDIAC CATH LAB     EP PACEMAKER N/A 7/5/2019    Procedure: EP Pacemaker;  Surgeon: Lee Menchaca MD;  Location:  HEART CARDIAC CATH LAB     EXCISE EXOSTOSIS TIBIA / FIBULA  6/30/2014    Procedure: EXCISE EXOSTOSIS TIBIA / FIBULA;  Surgeon: Naila Pichardo MD;  Location: [facility]     hysteroscopy and d and c  2002    due to bleeding     left anle replacement  2007     right ankle surgery  2003     Family History   Problem Relation Age of Onset     Heart Disease Father      C.A.D. Mother      Cerebrovascular Disease Brother      Family History Negative Sister      Family History Negative Sister      Family History Negative Brother             Allergies   Blood transfusion related (informational only) and Penicillin [penicillins]        DAYSI Arellano CNP  [facility]  Pager: [phone]      Thank you for allowing me to participate in the care of your patient.      Sincerely,     DAYSI Arellano CNP     [facility]    cc:   Addy Frias MD  [address] [address]

## 2019-07-17 NOTE — LETTER
7/17/2019    Addy Frias MD  [address]  [place] [address]    RE: Amira Arreola       Dear Colleague,    I had the pleasure of seeing Amira Arreola in the [facility].    Cardiology Clinic Progress Note  Amira Arreola MRN# [id number]   YOB: 1932 Age: 87 year old   Primary Cardiologist: Dr. Rivera Reason for visit: CORE follow up/post-hosp followup            Assessment and Plan:   Amira Arreola is a very pleasant 86 year old female with a history of HFpEF, chronic atrial fibrillation s/p AV solomon ablation with PPM implantation 7/5/19, hypertension, mitral regurgitation, tricuspid regurgitation and hx of multiple myeloma mets to bone (dx 2016, currently being treated with Daratumab, Velcade, and Dexamethasone every 2 weeks). Patient with 2 recent hospitalizations, 7/3/19-7/7/19 and  6/28/19-7/1/19, see below for details. Patient here today for CORE follow up/post hospital follow up.    1.  Chronic diastolic heart failure/HFpEF - Echocardiogram completed 3/23/19 LVEF 55-60%, no wall motion abnormalities, moderate mitral regurgitation, moderate tricuspid regurgitation, and severe pulmonary hypertension. Weight today 131#, which has been stable since hospital discharge. Patient appears compensated and euvolemic. Atrial fibrillation with RVR was likely exacerbating HF, significant improvement in symptoms with heart rate control with AV solomon ablation. During most recent hospitalization her furosemide was decreased which she has been tolerating well.    - NYHA class III, stage C   - Fluid status : euvolemic   - Diuretic regimen : furosemide 20mg daily   - Aldosterone antagonist : none   - Blood pressure : controlled   - Reinforced HF education, reviewed low sodium diet and reading labels today.    - Weight currently being monitored at facility, reviewed if/when discharged home the importance of monitoring weight at home, reviewed when to notify clinic.      2. Chronic atrial fibrillation - s/p AV solomon ablation with PPM implantation 7/5/19, stable, significant improvement in symptoms since ablation and heart rate control achieved.    - [id number] score 5 (HTN, HF, age, female)   - Continue warfarin for thromboembolic prophylaxis.     3. Moderate mitral regurgitation, moderate tricuspid regurgitation   - Will consider repeat echocardiogram in the future when patient is euvolemic or if worsening symptoms, currently won't change any management clinically.      4. Severe pulmonary hypertension - likely secondary to HFpEF and likely improved with diuresis.    - Will consider repeat echocardiogram in the future when patient is euvolemic.     5. Multiple myeloma with mets to bone - follows with Dr. Roberts   - Chemo therapy currently on hold, plan for follow up with Dr. Roberts in August to reassess.     7. Advance care planning - patient and family met with palliative care while hospitalized, code status changed to DNR/DNI, patient still wishing to seek restorative care. Patient and family have a reasonable understanding of her current health status. Patient has very supportive/involved family.    - Health care directive completed, electing her daughter Eufemia as POA.    - Reviewed that palliative care is available in the clinic for further support in the future. Patient and daughter note they will reach out if interested in meeting with Dr. Farris.    - Patient currently residing in [place], with hopes to get home.     Changes today: none    Follow up plan:     Follow up with CORE in 6 weeks        History of Presenting Illness:    Amira Arreola is a very pleasant 86 year old female with a history of HFpEF, chronic atrial fibrillation, hypertension, mitral regurgitation, tricuspid regurgitation and hx of multiple myeloma mets to bone (dx 2016, currently being treated with Daratumab, Velcade, and Dexamethasone every 2 weeks).     Patient with 2 recent hospitalizations  7/3/19-7/7/19 presenting from the cardiology office with hypotension and altered mental status felt to be related to hypovolemia and hypercalcemia. Patient was in atrial fibrillation with RVR. Serial troponin negative. Unable to tolerate rate control medications due to hypotension s/p AV solomon ablation with PPM 7/5/19. Furosemide on hold during most of hospital stay due to concerns of hypovolemia, resumed at lower dosage 20mg at discharge. S/p thoracentesis 7/5 250cc removed, transudative. Palliative care met with patient/family during hospital stay, code status changed to DNR/DNI but family still wishing to receive restorative care. Discharge weight 131#. Patient discharged to [facility]. 6/28/19-7/1/19 related to atrial fibrillation with RVR, acute on chronic diastolic heart failure and bilateral pleural effusions. BNP 13K on admission, 2K at time of discharge. Diltiazem increased to 180mg/day. Prior to this patient was hospitalized in March, see below for details.     Echocardiogram completed 3/23/19 LVEF 55-60%, no wall motion abnormalities, moderate mitral regurgitation, moderate tricuspid regurgitation, and severe pulmonary hypertension.     Patient was last seen in CORE clinic by Dr. Rivera on 7/3/19 at which time patient was noted to by hypotensive (60s/40s), worsening confusion/drowsiness. Hospital admission was recommended.     Patient is here today for CORE followup. Daughter present for clinic visit. Patient currently at [facility], hope is to get back home. Weights being monitored daily at facility. Has been ranging 133-131#, which is stable since hospital discharge. Weight today 131#. Patient notes improved lower extremity edema. Light compression stockings getting placed daily. Denies abdominal distention/bloating. Denies shortness of breath at rest. Occasional exertional dyspnea with walking, notes she will take breaks. Sleeping good. Denies orthopnea or PND. Patient denies chest pain or chest tightness.  Denies dizziness, lightheadedness or other presyncopal symptoms. Denies tachycardia or palpitations. Denies falls. Denies bleeding episodes. Some complaints of chronic back pain, no changes or worsening. Chemo therapy was stopped until August, wanted to give her a break due to fatigue, plan to meet with Dr. Roberts on 8/7/19 to re-evaluate.     Hope is to get back home, she had homemaker services 3 x a week for 4 hours helping cleaning and bathing. Daughter setting up pill boxes. Daughter notes they will be re-evaluating home services at time of transition home. Recognizing that patient may need increased services.     Labs from yesterday show stable kidney function and electrolytes. Blood pressure 118/72 and HR 80.    Appetite is good. Eating meals at [facility] currently. When at home son helps with dinner. Not adding salt to foods. Patient reports drinking < 2L daily. No tobacco or alcohol use. Working with PT/OT at facility. Using a walker/wheelchair for assitance.         Recent Hospitalizations   7/3/19-7/7/19 presenting from the cardiology office with hypotension and altered mental status felt to be related to hypovolemia and hypercalcemia. Patient was in atrial fibrillation with RVR. Serial troponin negative. Unable to tolerate rate control medications due to hypotension s/p AV solomon ablation with PPM 7/5/19. Furosemide on hold during most of hospital stay due to concerns of hypovolemia, resumed at lower dosage 20mg at discharge. S/p thoracentesis 7/5 250cc removed, transudative. Palliative care met with patient/family during hospital stay, still wishing to receive restorative care.   6/28/19-7/1/19 related to atrial fibrillation with RVR, acute on chronic diastolic heart failure and bilateral pleural effusions. BNP 13K on admission, 2K at time of discharge. Diltiazem increased to 180mg/day.  3/22/19-3/28/19 presented with dyspnea, leg swelling and intermittent palpitations. Patient was found to have atrial  fibrillation with RVR and acute diastolic heart failure exacerbation. Weight 3/24/19 147# (doesn't appear admission weight was completed). Discharge weight 135#.        Social History    , 6 children, Enjoys keeping the house clean and orderly. Retired nurse.   Social History     Socioeconomic History     Marital status:      Spouse name: Tom     Number of children: 6     Years of education: Not on file     Highest education level: Not on file   Occupational History     Occupation: rn anesthetist     Employer: RETIRED   Social Needs     Financial resource strain: Not on file     Food insecurity:     Worry: Not on file     Inability: Not on file     Transportation needs:     Medical: Not on file     Non-medical: Not on file   Tobacco Use     Smoking status: Never Smoker     Smokeless tobacco: Never Used   Substance and Sexual Activity     Alcohol use: No     Alcohol/week: 0.0 oz     Drug use: No     Sexual activity: Never   Lifestyle     Physical activity:     Days per week: Not on file     Minutes per session: Not on file     Stress: Not on file   Relationships     Social connections:     Talks on phone: Not on file     Gets together: Not on file     Attends Anabaptist service: Not on file     Active member of club or organization: Not on file     Attends meetings of clubs or organizations: Not on file     Relationship status: Not on file     Intimate partner violence:     Fear of current or ex partner: Not on file     Emotionally abused: Not on file     Physically abused: Not on file     Forced sexual activity: Not on file   Other Topics Concern     Parent/sibling w/ CABG, MI or angioplasty before 65F 55M? Not Asked   Social History Narrative     Not on file            Review of Systems:   Skin:  Positive for bruising   Eyes:  Positive for glaucoma  ENT:  Negative    Respiratory:  Negative    Cardiovascular:    fatigue;Positive for  Gastroenterology: Negative    Genitourinary:  Negative   "  Musculoskeletal:  Positive for arthritis  Neurologic:  Positive for local weakness;incoordination  Psychiatric:  Negative    Heme/Lymph/Imm:  Positive for allergies  Endocrine:  Negative           Physical Exam:   Vitals: /72   Pulse 80   Ht 1.651 m (5' 5\")   Wt 60.6 kg (133 lb 8 oz)   SpO2 97%   BMI 22.22 kg/m      Wt Readings from Last 4 Encounters:   07/17/19 60.6 kg (133 lb 8 oz)   07/15/19 60.3 kg (133 lb)   07/11/19 61 kg (134 lb 6.4 oz)   07/09/19 60.4 kg (133 lb 1.6 oz)     GEN: frail, elderly  HEENT:  Pupils equal, round. Sclerae nonicteric.   NECK: Supple, no masses appreciated. JVP appears normal  C/V:  Irregularly irregular rate and rhythm, no murmur, rub or gallop.   RESP: Respirations are unlabored. Clear bilaterally. Diminished in bases.   GI: Abdomen distended, nontender.  EXTREM: Trace bilateral lower extremity edema  NEURO: Alert and cooperative.  SKIN: Warm and dry.        Data:   ECHO 3/23/19  The left ventricle is normal in size.  The visual ejection fraction is estimated at 55-60%.  No regional wall motion abnormalities noted.  There is moderate (2+) mitral regurgitation.  There is moderate (2+) tricuspid regurgitation.  Severe (>55mmHg) pulmonary hypertension is present.  The rhythm was rapid atrial fibrillation.    LIPID RESULTS:  Lab Results   Component Value Date    CHOL 160 10/12/2016    HDL 76 10/12/2016    LDL 71 10/12/2016    TRIG 66 10/12/2016    CHOLHDLRATIO 2.3 09/21/2015     LIVER ENZYME RESULTS:  Lab Results   Component Value Date    AST 22 07/03/2019    ALT 32 07/03/2019     CBC RESULTS:  Lab Results   Component Value Date    WBC 7.9 07/06/2019    RBC 3.30 (L) 07/06/2019    HGB 11.9 07/10/2019    HCT 31.8 (L) 07/06/2019    MCV 96 07/06/2019    MCH 31.8 07/06/2019    MCHC 33.0 07/06/2019    RDW 18.2 (H) 07/06/2019     07/06/2019     BMP RESULTS:  Lab Results   Component Value Date     07/16/2019    POTASSIUM 4.2 07/16/2019    CHLORIDE 110 (H) 07/16/2019    " CO2 21 07/16/2019    ANIONGAP 10 07/16/2019     (H) 07/16/2019    BUN 21 07/16/2019    CR 0.54 07/16/2019    GFRESTIMATED 85 07/16/2019    GFRESTBLACK >90 07/16/2019    BRADLEY 9.1 07/16/2019      INR RESULTS:  Lab Results   Component Value Date    INR 1.61 (H) 07/07/2019    INR 1.36 (H) 07/06/2019            Medications     Current Outpatient Medications   Medication Sig Dispense Refill     acetaminophen (TYLENOL) 500 MG tablet Take 500 mg by mouth every 6 hours as needed for mild pain        acyclovir (ZOVIRAX) 400 MG tablet Take 1 tablet (400 mg) by mouth 2 times daily 60 tablet 3     Carboxymethylcellulose Sod PF (REFRESH PLUS) 0.5 % SOLN ophthalmic solution 1 drop 4 times daily as needed for dry eyes       dexamethasone (DECADRON) 4 MG tablet Take 20mg (5 tablets) by mouth every week. 28 tablet 3     furosemide (LASIX) 20 MG tablet Take 1 tablet (20 mg) by mouth daily       latanoprost (XALATAN) 0.005 % ophthalmic solution Place 1 drop into the right eye At Bedtime       lidocaine-prilocaine (EMLA) cream Apply to port site 1 hour prior to access 30 g 1     Multiple Vitamins-Minerals (DAILY MULTIVITAMIN) CAPS Take 1 tablet by mouth daily        oxyCODONE-acetaminophen (PERCOCET) 5-325 MG tablet Take 1 tablet by mouth every 4 hours as needed for breakthrough pain 30 tablet 0     polyethylene glycol (MIRALAX/GLYCOLAX) powder Take 17 g by mouth daily as needed for constipation        potassium chloride (KLOR-CON) 20 MEQ packet Take 20 mEq by mouth daily 60 packet 1     prochlorperazine (COMPAZINE) 10 MG tablet Take 1 tablet (10 mg) by mouth every 6 hours as needed for nausea or vomiting 30 tablet 1     SIMBRINZA 1-0.2 % ophthalmic suspension Place 1 drop into the right eye 2 times daily 1 drop AM and PM  2     Sodium Fluoride (SF 5000 PLUS) 1.1 % CREA Apply to affected area 3 times daily       warfarin (COUMADIN) 4 MG tablet Take one tablet (4 mg) by mouth on Tuesdays and Saturdays; take one-half tablet ( 2 mg)  on all other days of the week.            Past Medical History     Past Medical History:   Diagnosis Date     Abnormal CXR 2018    then ct done and not significant     Acute diastolic heart failure (H) 2019    nl ef, 2+mr and tr with severe pulm htn     Ascending aorta dilatation (H) 2016    on echo, mild, fu  4.0, slightly larger     Cancer, metastatic to bone (H)     due to myeloma     Colonic polyp     adenomatous, fu  tics only     Compression fracture     multiple areas of spine     Dry eyes      Elevated MCV     b12 and folic acid nl     HTN (hypertension)     off meds for years     Lung nodule 2018    on ct, 4mm, ct done for fu abnl cxr     Menorrhagia     hysteroscopy and d and c done     MGUS (monoclonal gammopathy of unknown significance)     eval by Dr. Roberts     Multiple myeloma (H) 2016    dx  at [facility], bone lesions seen on mri      OAB (overactive bladder)     Dr. Grullon     Osteoporosis     fu done  and stable, went off meds then, fu done ; has had gyn fu and added evista  by gyn     Palpitations     nl echo, mildly dilated asc aorta     Paroxysmal atrial fibrillation (H)     had palp and ziopatch showed it, echo nl lv fxn, mild mr and tr, added coum and toprol, toprol dose raised 16     Pulmonary hypertension (H) 2019    seen on echo     Sciatica of left side     Dr. Helen Ricardo 2004     SVT (supraventricular tachycardia) (H)     on ziopatch     Thrombocytopenia (H)      Past Surgical History:   Procedure Laterality Date     BONE MARROW BIOPSY, BONE SPECIMEN, NEEDLE/TROCAR N/A 2016    Procedure: BIOPSY BONE MARROW;  Surgeon: Bryan Patel MD;  Location:  GI     CATARACT IOL, RT/LT        SECTION  1965, 1966     COLONOSCOPY  2013    Procedure: COLONOSCOPY;  COLONOSCOPY;  Surgeon: Steffany Rockwell MD;  Location:  GI     EP ABLATION AV NODE N/A 2019     Procedure: EP Ablation AV Node;  Surgeon: Lee Menchaca MD;  Location:  HEART CARDIAC CATH LAB     EP PACEMAKER N/A 7/5/2019    Procedure: EP Pacemaker;  Surgeon: Lee Menchaca MD;  Location:  HEART CARDIAC CATH LAB     EXCISE EXOSTOSIS TIBIA / FIBULA  6/30/2014    Procedure: EXCISE EXOSTOSIS TIBIA / FIBULA;  Surgeon: Naila Pichardo MD;  Location: [facility]     hysteroscopy and d and c  2002    due to bleeding     left anle replacement  2007     right ankle surgery  2003     Family History   Problem Relation Age of Onset     Heart Disease Father      C.A.D. Mother      Cerebrovascular Disease Brother      Family History Negative Sister      Family History Negative Sister      Family History Negative Brother             Allergies   Blood transfusion related (informational only) and Penicillin [penicillins]        DAYSI Arellano CNP  [facility]  Pager: [phone]      Thank you for allowing me to participate in the care of your patient.    Sincerely,     DAYSI Arellano CNP     [facility]

## 2019-07-18 ENCOUNTER — NURSING HOME VISIT (OUTPATIENT)
Dept: GERIATRICS | Facility: CLINIC | Age: 84
End: 2019-07-18
Payer: MEDICARE

## 2019-07-18 VITALS
WEIGHT: 131.3 LBS | TEMPERATURE: 97.6 F | RESPIRATION RATE: 18 BRPM | DIASTOLIC BLOOD PRESSURE: 78 MMHG | OXYGEN SATURATION: 96 % | HEIGHT: 63 IN | BODY MASS INDEX: 23.27 KG/M2 | HEART RATE: 71 BPM | SYSTOLIC BLOOD PRESSURE: 144 MMHG

## 2019-07-18 DIAGNOSIS — C90.00 MULTIPLE MYELOMA NOT HAVING ACHIEVED REMISSION (H): ICD-10-CM

## 2019-07-18 DIAGNOSIS — Z95.0 S/P PLACEMENT OF CARDIAC PACEMAKER: ICD-10-CM

## 2019-07-18 DIAGNOSIS — Z86.79 S/P ABLATION OF ATRIAL FIBRILLATION: ICD-10-CM

## 2019-07-18 DIAGNOSIS — J90 PLEURAL EFFUSION: ICD-10-CM

## 2019-07-18 DIAGNOSIS — R41.89 COGNITIVE IMPAIRMENT: ICD-10-CM

## 2019-07-18 DIAGNOSIS — I10 BENIGN ESSENTIAL HYPERTENSION: ICD-10-CM

## 2019-07-18 DIAGNOSIS — Z98.890 S/P ABLATION OF ATRIAL FIBRILLATION: ICD-10-CM

## 2019-07-18 DIAGNOSIS — I27.20 PULMONARY HYPERTENSION (H): ICD-10-CM

## 2019-07-18 DIAGNOSIS — I48.91 ATRIAL FIBRILLATION WITH RAPID VENTRICULAR RESPONSE (H): Primary | ICD-10-CM

## 2019-07-18 DIAGNOSIS — R53.81 PHYSICAL DECONDITIONING: ICD-10-CM

## 2019-07-18 DIAGNOSIS — I50.33 ACUTE ON CHRONIC DIASTOLIC CONGESTIVE HEART FAILURE (H): ICD-10-CM

## 2019-07-18 LAB
INR PPP: 2 (ref 0.8–1.1)
MDC_IDC_LEAD_IMPLANT_DT: NORMAL
MDC_IDC_LEAD_LOCATION: NORMAL
MDC_IDC_LEAD_LOCATION_DETAIL_1: NORMAL
MDC_IDC_LEAD_MFG: NORMAL
MDC_IDC_LEAD_MODEL: NORMAL
MDC_IDC_LEAD_POLARITY_TYPE: NORMAL
MDC_IDC_LEAD_SERIAL: NORMAL
MDC_IDC_MSMT_BATTERY_REMAINING_LONGEVITY: 120 MO
MDC_IDC_MSMT_BATTERY_STATUS: NORMAL
MDC_IDC_MSMT_LEADCHNL_RV_IMPEDANCE_VALUE: 758 OHM
MDC_IDC_MSMT_LEADCHNL_RV_PACING_THRESHOLD_AMPLITUDE: 0.9 V
MDC_IDC_MSMT_LEADCHNL_RV_PACING_THRESHOLD_PULSEWIDTH: 0.4 MS
MDC_IDC_MSMT_LEADCHNL_RV_SENSING_INTR_AMPL: 14.6 MV
MDC_IDC_PG_IMPLANT_DTM: NORMAL
MDC_IDC_PG_MFG: NORMAL
MDC_IDC_PG_MODEL: NORMAL
MDC_IDC_PG_SERIAL: NORMAL
MDC_IDC_PG_TYPE: NORMAL
MDC_IDC_SESS_CLINIC_NAME: NORMAL
MDC_IDC_SESS_DTM: NORMAL
MDC_IDC_SESS_TYPE: NORMAL
MDC_IDC_SET_BRADY_LOWRATE: 70 {BEATS}/MIN
MDC_IDC_SET_BRADY_MAX_SENSOR_RATE: 130 {BEATS}/MIN
MDC_IDC_SET_BRADY_MODE: NORMAL
MDC_IDC_SET_LEADCHNL_RA_SENSING_ADAPTATION_MODE: NORMAL
MDC_IDC_SET_LEADCHNL_RA_SENSING_ANODE_ELECTRODE_1: NORMAL
MDC_IDC_SET_LEADCHNL_RA_SENSING_ANODE_LOCATION_1: NORMAL
MDC_IDC_SET_LEADCHNL_RA_SENSING_CATHODE_ELECTRODE_1: NORMAL
MDC_IDC_SET_LEADCHNL_RA_SENSING_CATHODE_LOCATION_1: NORMAL
MDC_IDC_SET_LEADCHNL_RA_SENSING_POLARITY: NORMAL
MDC_IDC_SET_LEADCHNL_RA_SENSING_SENSITIVITY: 0.75 MV
MDC_IDC_SET_LEADCHNL_RV_PACING_AMPLITUDE: 1.5 V
MDC_IDC_SET_LEADCHNL_RV_PACING_ANODE_ELECTRODE_1: NORMAL
MDC_IDC_SET_LEADCHNL_RV_PACING_ANODE_LOCATION_1: NORMAL
MDC_IDC_SET_LEADCHNL_RV_PACING_CAPTURE_MODE: NORMAL
MDC_IDC_SET_LEADCHNL_RV_PACING_CATHODE_ELECTRODE_1: NORMAL
MDC_IDC_SET_LEADCHNL_RV_PACING_CATHODE_LOCATION_1: NORMAL
MDC_IDC_SET_LEADCHNL_RV_PACING_POLARITY: NORMAL
MDC_IDC_SET_LEADCHNL_RV_PACING_PULSEWIDTH: 0.4 MS
MDC_IDC_SET_LEADCHNL_RV_SENSING_ADAPTATION_MODE: NORMAL
MDC_IDC_SET_LEADCHNL_RV_SENSING_ANODE_ELECTRODE_1: NORMAL
MDC_IDC_SET_LEADCHNL_RV_SENSING_ANODE_LOCATION_1: NORMAL
MDC_IDC_SET_LEADCHNL_RV_SENSING_CATHODE_ELECTRODE_1: NORMAL
MDC_IDC_SET_LEADCHNL_RV_SENSING_CATHODE_LOCATION_1: NORMAL
MDC_IDC_SET_LEADCHNL_RV_SENSING_POLARITY: NORMAL
MDC_IDC_SET_LEADCHNL_RV_SENSING_SENSITIVITY: 2.5 MV
MDC_IDC_SET_ZONE_DETECTION_INTERVAL: 375 MS
MDC_IDC_SET_ZONE_TYPE: NORMAL
MDC_IDC_SET_ZONE_VENDOR_TYPE: NORMAL
MDC_IDC_STAT_BRADY_RV_PERCENT_PACED: 98 %
MDC_IDC_STAT_EPISODE_RECENT_COUNT: 0
MDC_IDC_STAT_EPISODE_RECENT_COUNT_DTM_END: NORMAL
MDC_IDC_STAT_EPISODE_RECENT_COUNT_DTM_START: NORMAL
MDC_IDC_STAT_EPISODE_TOTAL_COUNT: 0
MDC_IDC_STAT_EPISODE_TOTAL_COUNT_DTM_END: NORMAL
MDC_IDC_STAT_EPISODE_TYPE: NORMAL
MDC_IDC_STAT_EPISODE_TYPE: NORMAL
MDC_IDC_STAT_EPISODE_VENDOR_TYPE: NORMAL
MDC_IDC_STAT_EPISODE_VENDOR_TYPE: NORMAL

## 2019-07-18 PROCEDURE — 99309 SBSQ NF CARE MODERATE MDM 30: CPT | Performed by: NURSE PRACTITIONER

## 2019-07-18 ASSESSMENT — MIFFLIN-ST. JEOR: SCORE: 999.7

## 2019-07-18 NOTE — LETTER
"    7/18/2019        RE: Amira Arreola  [address]  [facility] [address]        Left [facility] GERIATRIC SERVICES  [facility] Medical Record Number:  [id number]  Place of Service where encounter took place:  [facility] [[id number]]  Chief Complaint   Patient presents with     RECHECK       HPI:    Amira Arreola  is a 87 year old (7/17/1932), who is being seen today for an episodic care visit.  HPI information obtained from: facility chart records, facility staff, patient report and [facility] chart review.  She came to this facility 7/7/2019 for short term rehab and medical management following hospitalization from the Cardiology clinic with hypotension and altered mental status. She was in afib with RVR and initially required diltiazem drip. She underwent ablation with pacemaker placement 7/5/2019. Metoprolol and diltiazem were discontinued. Lasix was held, then resumed at lower dose. CXR with left pleural effusion and she underwent left thoracentesis  7/5/2019 with 250 ml drained. Palliative Care consulted for goals of care and code status was changed to DNR/DNI. Chemotherapy is on hold due to decline in both cognition and functional status.   Calcium level was elevated at 11 on admission,  improved with IVF. Bisphosphonate therapy is on hold.      Today's concern is:     Atrial fibrillation with rapid ventricular response (H)-INR 2.0 today. HR: 71-87  Reports fatigue, but feeling well. She had many visitors yesterday for her birthday so is tired today. Denies pain at pacemaker site.   S/P ablation of atrial fibrillation  S/P placement of cardiac pacemaker  Acute on chronic diastolic congestive heart failure (H)-she was seen in the [facility] Clinic and Device Clinic 7/17/2019 with no changes in meds. Weight down 2 lbs to 131 lbs.   Pulmonary hypertension (H)  Pleural effusion-feels her breathing is \"easier.\" No cough or chest pain.   Multiple myeloma not having achieved remission " (H)-had back pain during the night, relieved with percocet.   Benign essential hypertension-BPs: 146/78, 140/78, 130/77  Cognitive impairment-pleasantly confused. Cooperative with cares. SLUMS 8/30, CPT 4.1/5.6  Physical deconditioning-ambulating 260 ft with walker and supervision to stand by assist. Requires stand by assist with cares.     Past Medical and Surgical History reviewed in Epic today.    MEDICATIONS:  Current Outpatient Medications   Medication Sig Dispense Refill     acetaminophen (TYLENOL) 500 MG tablet Take 500 mg by mouth every 6 hours as needed for mild pain        acyclovir (ZOVIRAX) 400 MG tablet Take 1 tablet (400 mg) by mouth 2 times daily 60 tablet 3     Carboxymethylcellulose Sod PF (REFRESH PLUS) 0.5 % SOLN ophthalmic solution 1 drop 4 times daily as needed for dry eyes       dexamethasone (DECADRON) 4 MG tablet Take 20mg (5 tablets) by mouth every week. 28 tablet 3     furosemide (LASIX) 20 MG tablet Take 1 tablet (20 mg) by mouth daily       latanoprost (XALATAN) 0.005 % ophthalmic solution Place 1 drop into the right eye At Bedtime       lidocaine-prilocaine (EMLA) cream Apply to port site 1 hour prior to access 30 g 1     Multiple Vitamins-Minerals (DAILY MULTIVITAMIN) CAPS Take 1 tablet by mouth daily        oxyCODONE-acetaminophen (PERCOCET) 5-325 MG tablet Take 1 tablet by mouth every 4 hours as needed for breakthrough pain 30 tablet 0     polyethylene glycol (MIRALAX/GLYCOLAX) powder Take 17 g by mouth daily as needed for constipation        potassium chloride (KLOR-CON) 20 MEQ packet Take 20 mEq by mouth daily 60 packet 1     prochlorperazine (COMPAZINE) 10 MG tablet Take 1 tablet (10 mg) by mouth every 6 hours as needed for nausea or vomiting 30 tablet 1     SIMBRINZA 1-0.2 % ophthalmic suspension Place 1 drop into the right eye 2 times daily 1 drop AM and PM  2     Sodium Fluoride (SF 5000 PLUS) 1.1 % CREA Apply to affected area 3 times daily       warfarin (COUMADIN) 4 MG tablet  "Take one tablet (4 mg) by mouth on Tuesdays and Saturdays; take one-half tablet ( 2 mg) on all other days of the week.         REVIEW OF SYSTEMS:  4 point ROS including Respiratory, CV, GI and , other than that noted in the HPI,  is negative    Objective:  /78   Pulse 71   Temp 97.6  F (36.4  C)   Resp 18   Ht 1.6 m (5' 3\")   Wt 59.6 kg (131 lb 4.8 oz)   SpO2 96%   BMI 23.26 kg/m     Exam:  GENERAL APPEARANCE:  Alert, in no distress  ENT:  Mouth and posterior oropharynx normal, moist mucous membranes, [place]  EYES:  EOM normal, conjunctiva and lids normal  NECK:  No adenopathy,masses or thyromegaly  RESP:  respiratory effort and palpation of chest normal, no respiratory distress, slightly decreased left base, right side is clear  CV:  Palpation and auscultation of heart done , regular rate and rhythm, no murmur,  +2 pedal pulses, no LE edema   ABDOMEN: soft, nontender, no hepatosplenomegaly or other masses  M/S:  wheelchair,  LOPEZ with good strength. No joint inflammation   SKIN:  pacemaker site clean,dry, intact, no erythema.  No rashes or open areas  PSYCH:  oriented to self, place, situation, insight and judgement impaired, memory impaired , affect and mood normal    Labs:   Labs done in SNF are in [place] EPIC. Please refer to them using EPIC/Care Everywhere.    ASSESSMENT / PLAN:  (I48.91) Atrial fibrillation with rapid ventricular response (H)  (primary encounter diagnosis)  (Z98.890,  Z86.79) S/P ablation of atrial fibrillation  (Z95.0) S/P placement of cardiac pacemaker  Comment: INR therapeutic. Rate controlled. Pacemaker site healing without signs of infection.   Plan: coumadin 4 mg  7/18 and 7/20, 2 mg the other days. INR 7/23/2019. Follow up Device Clinic 8/29/2019.     (I50.33) Acute on chronic diastolic congestive heart failure (H)  (I27.20) Pulmonary hypertension (H)  (J90) Pleural effusion  Comment: volume and respiratory status improved.   Plan: continue lasix. Daily weight. Monitor " respiratory status. Low sodium diet. Compression stockings during the day. Follow up with [facility] as scheduled.     (C90.00) Multiple myeloma not having achieved remission (H)  Comment: pain controlled. Chemotherapy on hold due to poor functional status   Plan: continue decadron and percocet. Follow up with Dr Roberts 8/7/2019.     (I10) Benign essential hypertension  Comment: hypotension has resolved   Plan: continue lasix for CHF. Monitor VS.     (R41.89) Cognitive impairment  Comment: moderate to severe deficits, which appears to be her baseline   Plan: supportive care     (R53.81) Physical deconditioning  Comment: progressing in therapies  Plan: continue PHYSICAL THERAPY/OT. Goal is to return home with family and increased services.       Electronically signed by:  DAYSI English CNP               Sincerely,        DAYSI English CNP

## 2019-07-18 NOTE — PROGRESS NOTES
"[facility]  [place] [facility] Record Number:  [id number]  [facility] of [facility] where encounter took place[facility] [[id number]]  Chief Complaint   Patient presents with     RECHECK       HPI:    Amira Arreola  is a 87 year old (7/17/1932), who is being seen today for an episodic care visit.  HPI information obtained from: facility chart records, facility staff, patient report and [facility] chart review.  She came to this facility 7/7/2019 for short term rehab and medical management following hospitalization from the Cardiology clinic with hypotension and altered mental status. She was in afib with RVR and initially required diltiazem drip. She underwent ablation with pacemaker placement 7/5/2019. Metoprolol and diltiazem were discontinued. Lasix was held, then resumed at lower dose. CXR with left pleural effusion and she underwent left thoracentesis  7/5/2019 with 250 ml drained. Palliative Care consulted for goals of care and code status was changed to DNR/DNI. Chemotherapy is on hold due to decline in both cognition and functional status.   Calcium level was elevated at 11 on admission,  improved with IVF. Bisphosphonate therapy is on hold.      Today's concern is:     Atrial fibrillation with rapid ventricular response (H)-INR 2.0 today. HR: 71-87  Reports fatigue, but feeling well. She had many visitors yesterday for her birthday so is tired today. Denies pain at pacemaker site.   S/P ablation of atrial fibrillation  S/P placement of cardiac pacemaker  Acute on chronic diastolic congestive heart failure (H)-she was seen in [facility] 7/17/2019 with no changes in meds. Weight down 2 lbs to 131 lbs.   Pulmonary hypertension (H)  Pleural effusion-feels her breathing is \"easier.\" No cough or chest pain.   Multiple myeloma not having achieved remission (H)-had back pain during the night, relieved with percocet.   Benign essential hypertension-BPs: " 146/78, 140/78, 130/77  Cognitive impairment-pleasantly confused. Cooperative with cares. SLUMS 8/30, CPT 4.1/5.6  Physical deconditioning-ambulating 260 ft with walker and supervision to stand by assist. Requires stand by assist with cares.     Past Medical and Surgical History reviewed in Epic today.    MEDICATIONS:  Current Outpatient Medications   Medication Sig Dispense Refill     acetaminophen (TYLENOL) 500 MG tablet Take 500 mg by mouth every 6 hours as needed for mild pain        acyclovir (ZOVIRAX) 400 MG tablet Take 1 tablet (400 mg) by mouth 2 times daily 60 tablet 3     Carboxymethylcellulose Sod PF (REFRESH PLUS) 0.5 % SOLN ophthalmic solution 1 drop 4 times daily as needed for dry eyes       dexamethasone (DECADRON) 4 MG tablet Take 20mg (5 tablets) by mouth every week. 28 tablet 3     furosemide (LASIX) 20 MG tablet Take 1 tablet (20 mg) by mouth daily       latanoprost (XALATAN) 0.005 % ophthalmic solution Place 1 drop into the right eye At Bedtime       lidocaine-prilocaine (EMLA) cream Apply to port site 1 hour prior to access 30 g 1     Multiple Vitamins-Minerals (DAILY MULTIVITAMIN) CAPS Take 1 tablet by mouth daily        oxyCODONE-acetaminophen (PERCOCET) 5-325 MG tablet Take 1 tablet by mouth every 4 hours as needed for breakthrough pain 30 tablet 0     polyethylene glycol (MIRALAX/GLYCOLAX) powder Take 17 g by mouth daily as needed for constipation        potassium chloride (KLOR-CON) 20 MEQ packet Take 20 mEq by mouth daily 60 packet 1     prochlorperazine (COMPAZINE) 10 MG tablet Take 1 tablet (10 mg) by mouth every 6 hours as needed for nausea or vomiting 30 tablet 1     SIMBRINZA 1-0.2 % ophthalmic suspension Place 1 drop into the right eye 2 times daily 1 drop AM and PM  2     Sodium Fluoride (SF 5000 PLUS) 1.1 % CREA Apply to affected area 3 times daily       warfarin (COUMADIN) 4 MG tablet Take one tablet (4 mg) by mouth on Tuesdays and Saturdays; take one-half tablet ( 2 mg) on all  "other days of the week.         REVIEW OF SYSTEMS:  4 point ROS including Respiratory, CV, GI and , other than that noted in the HPI,  is negative    Objective:  /78   Pulse 71   Temp 97.6  F (36.4  C)   Resp 18   Ht 1.6 m (5' 3\")   Wt 59.6 kg (131 lb 4.8 oz)   SpO2 96%   BMI 23.26 kg/m    Exam:  GENERAL APPEARANCE:  Alert, in no distress  ENT:  Mouth and posterior oropharynx normal, moist mucous membranes, [place]  EYES:  EOM normal, conjunctiva and lids normal  NECK:  No adenopathy,masses or thyromegaly  RESP:  respiratory effort and palpation of chest normal, no respiratory distress, slightly decreased left base, right side is clear  CV:  Palpation and auscultation of heart done , regular rate and rhythm, no murmur,  +2 pedal pulses, no LE edema   ABDOMEN: soft, nontender, no hepatosplenomegaly or other masses  M/S:  wheelchair,  LOPEZ with good strength. No joint inflammation   SKIN:  pacemaker site clean,dry, intact, no erythema.  No rashes or open areas  PSYCH:  oriented to self, place, situation, insight and judgement impaired, memory impaired , affect and mood normal    Labs:   Labs done in SNF are in [place] EPIC. Please refer to them using [employer] Everywhere.    ASSESSMENT / PLAN:  (I48.91) Atrial fibrillation with rapid ventricular response (H)  (primary encounter diagnosis)  (Z98.890,  Z86.79) S/P ablation of atrial fibrillation  (Z95.0) S/P placement of cardiac pacemaker  Comment: INR therapeutic. Rate controlled. Pacemaker site healing without signs of infection.   Plan: coumadin 4 mg  7/18 and 7/20, 2 mg the other days. INR 7/23/2019. Follow up [facility] Clinic 8/29/2019.     (I50.33) Acute on chronic diastolic congestive heart failure (H)  (I27.20) Pulmonary hypertension (H)  (J90) Pleural effusion  Comment: volume and respiratory status improved.   Plan: continue lasix. Daily weight. Monitor respiratory status. Low sodium diet. Compression stockings during the day. Follow up with [facility] as " scheduled.     (C90.00) Multiple myeloma not having achieved remission (H)  Comment: pain controlled. Chemotherapy on hold due to poor functional status   Plan: continue decadron and percocet. Follow up with Dr Roberts 8/7/2019.     (I10) Benign essential hypertension  Comment: hypotension has resolved   Plan: continue lasix for CHF. Monitor VS.     (R41.89) Cognitive impairment  Comment: moderate to severe deficits, which appears to be her baseline   Plan: supportive care     (R53.81) Physical deconditioning  Comment: progressing in therapies  Plan: continue PHYSICAL THERAPY/OT. Goal is to return home with family and increased services.       Electronically signed by:  DAYSI English CNP

## 2019-07-22 ENCOUNTER — DISCHARGE SUMMARY NURSING HOME (OUTPATIENT)
Dept: GERIATRICS | Facility: CLINIC | Age: 84
End: 2019-07-22
Payer: MEDICARE

## 2019-07-22 VITALS
WEIGHT: 131.2 LBS | OXYGEN SATURATION: 96 % | BODY MASS INDEX: 23.25 KG/M2 | TEMPERATURE: 98.2 F | SYSTOLIC BLOOD PRESSURE: 129 MMHG | HEART RATE: 71 BPM | RESPIRATION RATE: 18 BRPM | HEIGHT: 63 IN | DIASTOLIC BLOOD PRESSURE: 78 MMHG

## 2019-07-22 DIAGNOSIS — R41.89 COGNITIVE IMPAIRMENT: ICD-10-CM

## 2019-07-22 DIAGNOSIS — Z95.0 S/P PLACEMENT OF CARDIAC PACEMAKER: ICD-10-CM

## 2019-07-22 DIAGNOSIS — Z86.79 S/P ABLATION OF ATRIAL FIBRILLATION: ICD-10-CM

## 2019-07-22 DIAGNOSIS — I27.20 PULMONARY HYPERTENSION (H): ICD-10-CM

## 2019-07-22 DIAGNOSIS — R53.81 PHYSICAL DECONDITIONING: ICD-10-CM

## 2019-07-22 DIAGNOSIS — I10 BENIGN ESSENTIAL HYPERTENSION: ICD-10-CM

## 2019-07-22 DIAGNOSIS — I48.91 ATRIAL FIBRILLATION WITH RAPID VENTRICULAR RESPONSE (H): Primary | ICD-10-CM

## 2019-07-22 DIAGNOSIS — J90 PLEURAL EFFUSION: ICD-10-CM

## 2019-07-22 DIAGNOSIS — Z98.890 S/P ABLATION OF ATRIAL FIBRILLATION: ICD-10-CM

## 2019-07-22 DIAGNOSIS — I50.33 ACUTE ON CHRONIC DIASTOLIC CONGESTIVE HEART FAILURE (H): ICD-10-CM

## 2019-07-22 DIAGNOSIS — C90.00 MULTIPLE MYELOMA NOT HAVING ACHIEVED REMISSION (H): ICD-10-CM

## 2019-07-22 PROCEDURE — 99316 NF DSCHRG MGMT 30 MIN+: CPT | Performed by: NURSE PRACTITIONER

## 2019-07-22 ASSESSMENT — MIFFLIN-ST. JEOR: SCORE: 999.25

## 2019-07-22 NOTE — LETTER
7/22/2019        RE: Amira Arreola  [address]  [place] [address]        [facility] DISCHARGE SUMMARY  PATIENT'S NAME: Amira Arreola  YOB: 1932  MEDICAL RECORD NUMBER:  [id number]  [facility] of Service where encounter took place[facility] [[id number]]    PRIMARY CARE PROVIDER AND CLINIC RESPONSIBLE AFTER TRANSFER:   Addy Frias MD, [address] / [place] [address]    [facility] Provider     Transferring providers: DAYSI English CNP, Edith sAh MD  Recent Hospitalization/ED:  [facility] stay 7/3/2019 to 7/7/2019.  Date of SNF Admission: July / 07 / 2019  Date of SNF (anticipated) Discharge: July / 24 / 2019  Discharged to: Home with family  Cognitive Scores: SLUMS: 8/30 and CPT: 4.1/5.6  DME: Walker    CODE STATUS/ADVANCE DIRECTIVES DISCUSSION:  DNR / DNI   ALLERGIES: Blood transfusion related (informational only) and Penicillin [penicillins]    DISCHARGE DIAGNOSIS/NURSING FACILITY COURSE:   She came to this facility  for short term rehab and medical management following hospitalization from the Cardiology clinic with hypotension and altered mental status. She was in afib with RVR and initially required diltiazem drip. She underwent ablation with pacemaker placement 7/5/2019. Metoprolol and diltiazem were discontinued. Lasix was held, then resumed at lower dose. CXR showed left pleural effusion and she underwent left thoracentesis  7/5/2019 with 250 ml drained. Palliative Care consulted for goals of care and code status was changed to DNR/DNI. Chemotherapy is on hold due to decline in both cognition and functional status. Calcium level was elevated at 11 on hospital admission,  improved with IVF. Bisphosphonate therapy is on hold.     She has worked with PHYSICAL THERAPY and OT with good progress. Ambulating 225 ft with walker and stand by assist. Requires assist of 1 with transfers and  all cares. TUG 32 seconds, indicating high fall risk.   ASSESSMENT / PLAN:  (I48.91) Atrial fibrillation with rapid ventricular response (H)  (primary encounter diagnosis)  (Z98.890,  Z86.79) S/P ablation of atrial fibrillation  (Z95.0) S/P placement of cardiac pacemaker  Comment: pacemaker site healing without signs of infection. Rate controlled: 70-73. She was seen in [facility] Clinic 7/17/2019. Last INR was 2.0 on 7/18/2019 on coumadin 2-4 mg daily.  She is feeling well and eager to return home. Lives with her  and son, who provide 24 hr care. Family members very involved.   Plan: discharge home with family. Coumadin 2 mg tonight. INR 7/23/2019. Home services and follow up as below.     (I50.33) Acute on chronic diastolic congestive heart failure (H)  (I27.20) Pulmonary hypertension (H)  (J90) Pleural effusion  Comment: volume and respiratory status improved. Weight stable at 131 lbs. No hypoxia or dyspnea. She was last seen in [facility] 7/17/2019 with no changes in meds.   Plan: continue lasix. Daily weights. Compression stockings during the day. Low sodium diet.     (C90.00) Multiple myeloma not having achieved remission (H)  Comment: back pain is controlled. Has used percocet twice in the past week.   Plan: continue decadron and percocet. Follow up with Dr Roberts 8/7/2019.     (I10) Benign essential hypertension  Comment: controlled with BPs: 129/78, 112/62, 120/74  Plan: continue lasix for CHF. Avoid hypotension due to advanced age and fall risk.     (R41.89) Cognitive impairment  Comment: moderate to severe deficits on cognitive testing,which is her baseline  Plan: 24 hr care at home.     (R53.81) Physical deconditioning  Comment: progress in therapies as above   Plan: home therapies for ongoing gait training, strengthening and ADL safety     Past Medical History:  has a past medical history of Abnormal CXR (2018), Acute diastolic heart failure (H) (03/22/2019), Ascending aorta dilatation (H)  (04/2016), Cancer, metastatic to bone (H), Colonic polyp (2008), Compression fracture (2016), Dry eyes, Elevated MCV (2015), HTN (hypertension) (2000), Lung nodule (08/2018), Menorrhagia (2002), MGUS (monoclonal gammopathy of unknown significance) (2015), Multiple myeloma (H) (2016), OAB (overactive bladder) (2013), Osteoporosis, Palpitations (4/16), Paroxysmal atrial fibrillation (H) (4/16), Pulmonary hypertension (H) (03/2019), Sciatica of left side (12/13), Shingles (2004), SVT (supraventricular tachycardia) (H) (4/16), and Thrombocytopenia (H) (2014). She also has no past medical history of PONV (postoperative nausea and vomiting).    Discharge Medications:  Current Outpatient Medications   Medication Sig Dispense Refill     acetaminophen (TYLENOL) 500 MG tablet Take 500 mg by mouth every 6 hours as needed for mild pain        acyclovir (ZOVIRAX) 400 MG tablet Take 1 tablet (400 mg) by mouth 2 times daily 60 tablet 3     Carboxymethylcellulose Sod PF (REFRESH PLUS) 0.5 % SOLN ophthalmic solution 1 drop 4 times daily as needed for dry eyes       dexamethasone (DECADRON) 4 MG tablet Take 20mg (5 tablets) by mouth every week. 28 tablet 3     furosemide (LASIX) 20 MG tablet Take 1 tablet (20 mg) by mouth daily       latanoprost (XALATAN) 0.005 % ophthalmic solution Place 1 drop into the right eye At Bedtime       lidocaine-prilocaine (EMLA) cream Apply to port site 1 hour prior to access 30 g 1     Multiple Vitamins-Minerals (DAILY MULTIVITAMIN) CAPS Take 1 tablet by mouth daily        oxyCODONE-acetaminophen (PERCOCET) 5-325 MG tablet Take 1 tablet by mouth every 4 hours as needed for breakthrough pain 30 tablet 0     polyethylene glycol (MIRALAX/GLYCOLAX) powder Take 17 g by mouth daily as needed for constipation        potassium chloride (KLOR-CON) 20 MEQ packet Take 20 mEq by mouth daily 60 packet 1     prochlorperazine (COMPAZINE) 10 MG tablet Take 1 tablet (10 mg) by mouth every 6 hours as needed for nausea or  "vomiting 30 tablet 1     SIMBRINZA 1-0.2 % ophthalmic suspension Place 1 drop into the right eye 2 times daily 1 drop AM and PM  2     Sodium Fluoride (SF 5000 PLUS) 1.1 % CREA Apply to affected area 3 times daily       warfarin (COUMADIN) 4 MG tablet Take one tablet (4 mg) by mouth on Tuesdays and Saturdays; take one-half tablet ( 2 mg) on all other days of the week.         Medication Changes/Rationale:     Coumadin dosed for goal INR 2-3      Controlled medications sent with patient:   Medication: percocet , 20 tabs given to patient at the time of discharge to take home     ROS:   4 point ROS including Respiratory, CV, GI and , other than that noted in the HPI,  is negative    Physical Exam:   Vitals: /78   Pulse 71   Temp 98.2  F (36.8  C)   Resp 18   Ht 1.6 m (5' 3\")   Wt 59.5 kg (131 lb 3.2 oz)   SpO2 96%   BMI 23.24 kg/m     BMI= Body mass index is 23.24 kg/m .  GENERAL APPEARANCE:  Alert, in no distress  ENT:  Mouth and posterior oropharynx normal, moist mucous membranes, [place]  EYES:  EOM normal, conjunctiva and lids normal  NECK:  No adenopathy,masses or thyromegaly  RESP:  respiratory effort and palpation of chest normal, no respiratory distress, slightly decreased left base, right side is clear  CV:  Palpation and auscultation of heart done , regular rate and rhythm, no murmur,  +2 pedal pulses, no LE edema   ABDOMEN: soft, nontender, no hepatosplenomegaly or other masses  M/S:  in bed.  LOPEZ with good strength. No joint inflammation   SKIN:  pacemaker site clean,dry, intact, no erythema.  No rashes or open areas  PSYCH:  oriented to self, place, situation, insight and judgement impaired, memory impaired , affect and mood normal    SNF labs:   Lab Results   Component Value Date    WBC 7.9 07/06/2019     Lab Results   Component Value Date    RBC 3.30 07/06/2019     Lab Results   Component Value Date    HGB 11.9 07/10/2019     Lab Results   Component Value Date    HCT 31.8 07/06/2019     Lab " Results   Component Value Date    MCV 96 07/06/2019     Lab Results   Component Value Date    MCH 31.8 07/06/2019     Lab Results   Component Value Date    MCHC 33.0 07/06/2019     Lab Results   Component Value Date    RDW 18.2 07/06/2019     Lab Results   Component Value Date     07/06/2019     Last Comprehensive Metabolic Panel:  Sodium   Date Value Ref Range Status   07/16/2019 141 133 - 144 mmol/L Final     Potassium   Date Value Ref Range Status   07/16/2019 4.2 3.4 - 5.3 mmol/L Final     Chloride   Date Value Ref Range Status   07/16/2019 110 (H) 94 - 109 mmol/L Final     Carbon Dioxide   Date Value Ref Range Status   07/16/2019 21 20 - 32 mmol/L Final     Anion Gap   Date Value Ref Range Status   07/16/2019 10 3 - 14 mmol/L Final     Glucose   Date Value Ref Range Status   07/16/2019 170 (H) 70 - 99 mg/dL Final     Urea Nitrogen   Date Value Ref Range Status   07/16/2019 21 7 - 30 mg/dL Final     Creatinine   Date Value Ref Range Status   07/16/2019 0.54 0.52 - 1.04 mg/dL Final     GFR Estimate   Date Value Ref Range Status   07/16/2019 85 >60 mL/min/[1.73_m2] Final     Comment:     Non  GFR Calc  Starting 12/18/2018, serum creatinine based estimated GFR (eGFR) will be   calculated using the Chronic Kidney Disease Epidemiology Collaboration   (CKD-EPI) equation.       Calcium   Date Value Ref Range Status   07/16/2019 9.1 8.5 - 10.1 mg/dL Final       DISCHARGE PLAN:    Follow up labs: per PCP    Medical Follow Up:      Follow up with primary care provider in 1-2  weeks   Follow up with Dr Roberts 8/7/2019   Follow up with [facility] 8/29/2019    MTM referral needed and placed by this provider: No    Current [place] scheduled appointments:  Next 5 appointments (look out 90 days)    Aug 07, 2019  1:00 PM CDT  Return Visit with Shayne Roberts MD  [facility] ([facility]) [facility] [place]  [address]  [place]  [address]  [phone]   Aug 29, 2019 10:10 AM CDT  Return Visit with DAYSI Adair CNP  [facility] ([facility]) [address] [place] [id number]  [phone] OPT 2           Discharge Services: Home Care:  Occupational Therapy, Physical Therapy, Registered Nurse, Home Health Aide and From:  [facility]    Discharge Instructions Verbalized to Patient at Discharge:     Weigh yourself daily in the morning and keep a record. Call [facility]  if you are more short of breath, or if your weight changes more than 3 pounds in one day or more than 5 pounds in one week.       TOTAL DISCHARGE TIME:   Greater than 30 minutes  Electronically signed by:  DAYSI English CNP                     Sincerely,        DAYSI English CNP

## 2019-07-22 NOTE — PROGRESS NOTES
[facility] DISCHARGE SUMMARY  PATIENT'S NAME: Amira Arreola  YOB: 1932  MEDICAL RECORD NUMBER:  [id number]  Place of Service where encounter took place:  [facility] [[id number]]    PRIMARY CARE PROVIDER AND CLINIC RESPONSIBLE AFTER TRANSFER:   Addy Frias MD, [address] / [place] [address]    FMG Provider     Transferring providers: DAYSI English CNP, Edith Ash MD  Recent Hospitalization/ED:  [facility] Hospital stay 7/3/2019 to 7/7/2019.  Date of SNF Admission: July / 07 / 2019  Date of SNF (anticipated) Discharge: July / 24 / 2019  Discharged to: Home with family  Cognitive Scores: SLUMS: 8/30 and CPT: 4.1/5.6  DME: Walker    CODE STATUS/ADVANCE DIRECTIVES DISCUSSION:  DNR / DNI   ALLERGIES: Blood transfusion related (informational only) and Penicillin [penicillins]    DISCHARGE DIAGNOSIS/NURSING FACILITY COURSE:   She came to this facility  for short term rehab and medical management following hospitalization from the Cardiology clinic with hypotension and altered mental status. She was in afib with RVR and initially required diltiazem drip. She underwent ablation with pacemaker placement 7/5/2019. Metoprolol and diltiazem were discontinued. Lasix was held, then resumed at lower dose. CXR showed left pleural effusion and she underwent left thoracentesis  7/5/2019 with 250 ml drained. Palliative Care consulted for goals of care and code status was changed to DNR/DNI. Chemotherapy is on hold due to decline in both cognition and functional status. Calcium level was elevated at 11 on hospital admission,  improved with IVF. Bisphosphonate therapy is on hold.     She has worked with PHYSICAL THERAPY and OT with good progress. Ambulating 225 ft with walker and stand by assist. Requires assist of 1 with transfers and all cares. TUG 32 seconds, indicating high fall risk.   ASSESSMENT / PLAN:  (I48.91) Atrial  fibrillation with rapid ventricular response (H)  (primary encounter diagnosis)  (Z98.890,  Z86.79) S/P ablation of atrial fibrillation  (Z95.0) S/P placement of cardiac pacemaker  Comment: pacemaker site healing without signs of infection. Rate controlled: 70-73. She was seen in Device Clinic 7/17/2019. Last INR was 2.0 on 7/18/2019 on coumadin 2-4 mg daily.  She is feeling well and eager to return home. Lives with her  and son, who provide 24 hr care. Family members very involved.   Plan: discharge home with family. Coumadin 2 mg tonight. INR 7/23/2019. Home services and follow up as below.     (I50.33) Acute on chronic diastolic congestive heart failure (H)  (I27.20) Pulmonary hypertension (H)  (J90) Pleural effusion  Comment: volume and respiratory status improved. Weight stable at 131 lbs. No hypoxia or dyspnea. She was last seen in the [facility] 7/17/2019 with no changes in meds.   Plan: continue lasix. Daily weights. Compression stockings during the day. Low sodium diet.     (C90.00) Multiple myeloma not having achieved remission (H)  Comment: back pain is controlled. Has used percocet twice in the past week.   Plan: continue decadron and percocet. Follow up with Dr Roberts 8/7/2019.     (I10) Benign essential hypertension  Comment: controlled with BPs: 129/78, 112/62, 120/74  Plan: continue lasix for CHF. Avoid hypotension due to advanced age and fall risk.     (R41.89) Cognitive impairment  Comment: moderate to severe deficits on cognitive testing,which is her baseline  Plan: 24 hr care at home.     (R53.81) Physical deconditioning  Comment: progress in therapies as above   Plan: home therapies for ongoing gait training, strengthening and ADL safety     Past Medical History:  has a past medical history of Abnormal CXR (2018), Acute diastolic heart failure (H) (03/22/2019), Ascending aorta dilatation (H) (04/2016), Cancer, metastatic to bone (H), Colonic polyp (2008), Compression fracture (2016), Dry  eyes, Elevated MCV (2015), HTN (hypertension) (2000), Lung nodule (08/2018), Menorrhagia (2002), MGUS (monoclonal gammopathy of unknown significance) (2015), Multiple myeloma (H) (2016), OAB (overactive bladder) (2013), Osteoporosis, Palpitations (4/16), Paroxysmal atrial fibrillation (H) (4/16), Pulmonary hypertension (H) (03/2019), Sciatica of left side (12/13), Shingles (2004), SVT (supraventricular tachycardia) (H) (4/16), and Thrombocytopenia (H) (2014). She also has no past medical history of PONV (postoperative nausea and vomiting).    Discharge Medications:  Current Outpatient Medications   Medication Sig Dispense Refill     acetaminophen (TYLENOL) 500 MG tablet Take 500 mg by mouth every 6 hours as needed for mild pain        acyclovir (ZOVIRAX) 400 MG tablet Take 1 tablet (400 mg) by mouth 2 times daily 60 tablet 3     Carboxymethylcellulose Sod PF (REFRESH PLUS) 0.5 % SOLN ophthalmic solution 1 drop 4 times daily as needed for dry eyes       dexamethasone (DECADRON) 4 MG tablet Take 20mg (5 tablets) by mouth every week. 28 tablet 3     furosemide (LASIX) 20 MG tablet Take 1 tablet (20 mg) by mouth daily       latanoprost (XALATAN) 0.005 % ophthalmic solution Place 1 drop into the right eye At Bedtime       lidocaine-prilocaine (EMLA) cream Apply to port site 1 hour prior to access 30 g 1     Multiple Vitamins-Minerals (DAILY MULTIVITAMIN) CAPS Take 1 tablet by mouth daily        oxyCODONE-acetaminophen (PERCOCET) 5-325 MG tablet Take 1 tablet by mouth every 4 hours as needed for breakthrough pain 30 tablet 0     polyethylene glycol (MIRALAX/GLYCOLAX) powder Take 17 g by mouth daily as needed for constipation        potassium chloride (KLOR-CON) 20 MEQ packet Take 20 mEq by mouth daily 60 packet 1     prochlorperazine (COMPAZINE) 10 MG tablet Take 1 tablet (10 mg) by mouth every 6 hours as needed for nausea or vomiting 30 tablet 1     SIMBRINZA 1-0.2 % ophthalmic suspension Place 1 drop into the right eye  "2 times daily 1 drop AM and PM  2     Sodium Fluoride (SF 5000 PLUS) 1.1 % CREA Apply to affected area 3 times daily       warfarin (COUMADIN) 4 MG tablet Take one tablet (4 mg) by mouth on Tuesdays and Saturdays; take one-half tablet ( 2 mg) on all other days of the week.         Medication Changes/Rationale:     Coumadin dosed for goal INR 2-3      Controlled medications sent with patient:   Medication: percocet , 20 tabs given to patient at the time of discharge to take home     ROS:   4 point ROS including Respiratory, CV, GI and , other than that noted in the HPI,  is negative    Physical Exam:   Vitals: /78   Pulse 71   Temp 98.2  F (36.8  C)   Resp 18   Ht 1.6 m (5' 3\")   Wt 59.5 kg (131 lb 3.2 oz)   SpO2 96%   BMI 23.24 kg/m    BMI= Body mass index is 23.24 kg/m .  GENERAL APPEARANCE:  Alert, in no distress  ENT:  Mouth and posterior oropharynx normal, moist mucous membranes, [place]  EYES:  EOM normal, conjunctiva and lids normal  NECK:  No adenopathy,masses or thyromegaly  RESP:  respiratory effort and palpation of chest normal, no respiratory distress, slightly decreased left base, right side is clear  CV:  Palpation and auscultation of heart done , regular rate and rhythm, no murmur,  +2 pedal pulses, no LE edema   ABDOMEN: soft, nontender, no hepatosplenomegaly or other masses  M/S:  in bed.  LOPEZ with good strength. No joint inflammation   SKIN:  pacemaker site clean,dry, intact, no erythema.  No rashes or open areas  PSYCH:  oriented to self, place, situation, insight and judgement impaired, memory impaired , affect and mood normal    SNF labs:   Lab Results   Component Value Date    WBC 7.9 07/06/2019     Lab Results   Component Value Date    RBC 3.30 07/06/2019     Lab Results   Component Value Date    HGB 11.9 07/10/2019     Lab Results   Component Value Date    HCT 31.8 07/06/2019     Lab Results   Component Value Date    MCV 96 07/06/2019     Lab Results   Component Value Date    MCH " 31.8 07/06/2019     Lab Results   Component Value Date    MCHC 33.0 07/06/2019     Lab Results   Component Value Date    RDW 18.2 07/06/2019     Lab Results   Component Value Date     07/06/2019     Last Comprehensive Metabolic Panel:  Sodium   Date Value Ref Range Status   07/16/2019 141 133 - 144 mmol/L Final     Potassium   Date Value Ref Range Status   07/16/2019 4.2 3.4 - 5.3 mmol/L Final     Chloride   Date Value Ref Range Status   07/16/2019 110 (H) 94 - 109 mmol/L Final     Carbon Dioxide   Date Value Ref Range Status   07/16/2019 21 20 - 32 mmol/L Final     Anion Gap   Date Value Ref Range Status   07/16/2019 10 3 - 14 mmol/L Final     Glucose   Date Value Ref Range Status   07/16/2019 170 (H) 70 - 99 mg/dL Final     Urea Nitrogen   Date Value Ref Range Status   07/16/2019 21 7 - 30 mg/dL Final     Creatinine   Date Value Ref Range Status   07/16/2019 0.54 0.52 - 1.04 mg/dL Final     GFR Estimate   Date Value Ref Range Status   07/16/2019 85 >60 mL/min/[1.73_m2] Final     Comment:     Non  GFR Calc  Starting 12/18/2018, serum creatinine based estimated GFR (eGFR) will be   calculated using the Chronic Kidney Disease Epidemiology Collaboration   (CKD-EPI) equation.       Calcium   Date Value Ref Range Status   07/16/2019 9.1 8.5 - 10.1 mg/dL Final       DISCHARGE PLAN:    Follow up labs: per PCP    Medical Follow Up:      Follow up with primary care provider in 1-2  weeks   Follow up with Dr Roberts 8/7/2019   Follow up with [facility] 8/29/2019    MTM referral needed and placed by this provider: No    Current [place] scheduled appointments:  Next 5 appointments (look out 90 days)    Aug 07, 2019  1:00 PM CDT  Return Visit with Shayne Roberts MD  [facility] ([facility]) [facility] [place]  [address]  [place] [address]  [phone]   Aug 29, 2019 10:10 AM CDT  Return Visit with DAYSI Adair CNP  [facility] ([facility]) [id number]  [facility] [id number] [phone] OPT 2           Discharge Services: Home Care:  Occupational Therapy, Physical Therapy, Registered Nurse, Home Health Aide and From:  [facility]    Discharge Instructions Verbalized to Patient at Discharge:     Weigh yourself daily in the morning and keep a record. Call [facility]  if you are more short of breath, or if your weight changes more than 3 pounds in one day or more than 5 pounds in one week.       TOTAL DISCHARGE TIME:   Greater than 30 minutes  Electronically signed by:  DAYSI English CNP

## 2019-07-23 ENCOUNTER — NURSING HOME VISIT (OUTPATIENT)
Dept: GERIATRICS | Facility: CLINIC | Age: 84
End: 2019-07-23
Payer: MEDICARE

## 2019-07-23 VITALS
RESPIRATION RATE: 18 BRPM | DIASTOLIC BLOOD PRESSURE: 74 MMHG | OXYGEN SATURATION: 94 % | HEART RATE: 79 BPM | SYSTOLIC BLOOD PRESSURE: 123 MMHG | HEIGHT: 63 IN | TEMPERATURE: 98.2 F | WEIGHT: 130.3 LBS | BODY MASS INDEX: 23.09 KG/M2

## 2019-07-23 DIAGNOSIS — Z79.01 LONG TERM CURRENT USE OF ANTICOAGULANT THERAPY: ICD-10-CM

## 2019-07-23 DIAGNOSIS — Z51.81 ENCOUNTER FOR MONITORING COUMADIN THERAPY: ICD-10-CM

## 2019-07-23 DIAGNOSIS — Z79.01 ENCOUNTER FOR MONITORING COUMADIN THERAPY: ICD-10-CM

## 2019-07-23 DIAGNOSIS — I48.91 ATRIAL FIBRILLATION WITH RAPID VENTRICULAR RESPONSE (H): Primary | ICD-10-CM

## 2019-07-23 LAB — INR PPP: 1.8 (ref 0.8–1.1)

## 2019-07-23 PROCEDURE — 99308 SBSQ NF CARE LOW MDM 20: CPT | Performed by: NURSE PRACTITIONER

## 2019-07-23 ASSESSMENT — MIFFLIN-ST. JEOR: SCORE: 995.17

## 2019-07-23 NOTE — PROGRESS NOTES
"[facility]  [facility] Record Number:  [id number]  [facility] of [facility] where encounter took place[facility] [[id number]]    HPI:    Amira Arreola is a 87 year old  (7/17/1932), who is being seen today for an episodic care visit at the above location.   HPI information obtained from: facility chart records, facility staff and patient report. Today's concern is INR/Coumadin management for A. Fib    ROS/Subjective:  Bleeding Signs/Symptoms:  None  Thromboembolic Signs/Symptoms:  None  Medication Changes:  No  Dietary Changes:  No  Activity Changes: No  Bacterial/Viral Infection:  No  Missed Coumadin Doses:  None  On ASA: No  Other Concerns:  Discharging home with family 7/24/2019    OBJECTIVE:  /74   Pulse 79   Temp 98.2  F (36.8  C)   Resp 18   Ht 1.6 m (5' 3\")   Wt 59.1 kg (130 lb 4.8 oz)   SpO2 94%   BMI 23.08 kg/m    Last INR: 2.0 on 7/18/2019  INR Today:  1.8  Current Dose:  4 mg Tues and Sat, 2 mg the other days of the week      ASSESSMENT:     Atrial fibrillation with rapid ventricular response (H)  Long term current use of anticoagulant therapy  Encounter for monitoring Coumadin therapy  Subtherapeutic INR for goal of 2-3    PLAN:     New Dose: 4 mg every Tues, Thurs and Sat, 2 mg the other days of the week    Next INR: 7/26/2019  Home care nurse to draw with results to PCP      Electronically signed by:  DAYSI English CNP       "

## 2019-07-24 DIAGNOSIS — Z79.01 LONG TERM CURRENT USE OF ANTICOAGULANT THERAPY: ICD-10-CM

## 2019-07-24 DIAGNOSIS — I48.0 PAROXYSMAL ATRIAL FIBRILLATION (H): ICD-10-CM

## 2019-07-24 RX ORDER — WARFARIN SODIUM 4 MG/1
2-4 TABLET ORAL DAILY
Qty: 90 TABLET | Refills: 0 | Status: SHIPPED | OUTPATIENT
Start: 2019-07-24 | End: 2019-10-19

## 2019-07-24 NOTE — TELEPHONE ENCOUNTER
"warfarin (COUMADIN) 4 MG tablet     --   Sig: Take one tablet (4 mg) by mouth on Tuesdays, Thursdays  and Saturdays; take one-half tablet ( 2 mg) on all other days of the week.   Class: Historical     Last Written Prescription Date:  Historical  Last Fill Quantity: ?,  # refills: ?   Last office visit: 4/15/2019 with prescribing provider:  Altagracia   Future Office Visit:   Next 5 appointments (look out 90 days)    Jul 30, 2019  2:30 PM CDT  Office Visit with Addy Frias MD  [facility] ([facility]) [address] [facility] [address]  [phone]   Aug 07, 2019  1:00 PM CDT  Return Visit with Shayne Roberts MD  [facility] ([facility]) [facility]  [address]  [facility] [address]  [phone]   Aug 29, 2019 10:10 AM CDT  Return Visit with DAYSI Adair [facility] ([facility]) [address] [facility]  [facility] [address]  [phone] OPT 2         Requested Prescriptions   Pending Prescriptions Disp Refills     warfarin (COUMADIN) 4 MG tablet [Pharmacy Med Name: WARFARIN SOD 4MG TABLETS (BLUE)] 180 tablet 0     Sig: TAKE ONE TO TWO TABLETS BY MOUTH EVERY DAY AS DIRECTED BY INR CLINIC       Vitamin K Antagonists Failed - 7/24/2019  3:55 AM        Failed - INR is within goal in the past 6 weeks     Confirm INR is within goal in the past 6 weeks.     Recent Labs   Lab Test 07/07/19  0634   INR 1.61*                       Passed - Recent (12 mo) or future (30 days) visit within the authorizing provider's specialty     Patient had office visit in the last 12 months or has a visit in the next 30 days with authorizing provider or within the authorizing provider's specialty.  See \"Patient Info\" tab in inbasket, or \"Choose Columns\" in Meds & Orders section of the refill encounter.              Passed - Medication is active on med list        Passed - Patient " is 18 years of age or older        Passed - Patient is not pregnant        Passed - No positive pregnancy on file in past 12 months        No flowsheet data found.

## 2019-07-24 NOTE — TELEPHONE ENCOUNTER
Prescription approved per [facility] Refill Protocol.    Bri VIZCARRA RN,BSN

## 2019-07-25 ENCOUNTER — TELEPHONE (OUTPATIENT)
Dept: FAMILY MEDICINE | Facility: CLINIC | Age: 84
End: 2019-07-25

## 2019-07-25 NOTE — TELEPHONE ENCOUNTER
Chief Complaint: Atrial Fibrillation With Rapid Ventricular Response (H),WED 24-JUL-2019 ,ed/ip 0 / 3    [phone] (home)

## 2019-07-26 ENCOUNTER — TELEPHONE (OUTPATIENT)
Dept: FAMILY MEDICINE | Facility: CLINIC | Age: 84
End: 2019-07-26

## 2019-07-26 ENCOUNTER — PATIENT OUTREACH (OUTPATIENT)
Dept: CARE COORDINATION | Facility: CLINIC | Age: 84
End: 2019-07-26

## 2019-07-26 DIAGNOSIS — Z79.01 LONG TERM CURRENT USE OF ANTICOAGULANT THERAPY: ICD-10-CM

## 2019-07-26 DIAGNOSIS — I48.0 PAROXYSMAL ATRIAL FIBRILLATION (H): Primary | ICD-10-CM

## 2019-07-26 DIAGNOSIS — I48.91 ATRIAL FIBRILLATION WITH RAPID VENTRICULAR RESPONSE (H): Primary | ICD-10-CM

## 2019-07-26 LAB — INR PPP: 1.4 (ref 0.8–1.1)

## 2019-07-26 ASSESSMENT — ACTIVITIES OF DAILY LIVING (ADL): DEPENDENT_IADLS:: CLEANING;COOKING;TRANSPORTATION;LAUNDRY;SHOPPING

## 2019-07-26 NOTE — LETTER
[facility] Home  Complex Care Plan  About Me:    Patient Name:  Amira Arreola    YOB: 1932  Age:         87 year old   [place] MRN:    [id number] Telephone Information:  Home Phone [phone]   Mobile [phone]       Address:  [address] [place] [address] Email address:  No e-mail address on record      Emergency Contact(s)    Name Relationship Lgl Grd Work Phone Home Phone Mobile Phone   1. EB MONTELONGO, JUL* Daughter No   [phone]   2. DILIA CHUCK Daughter No   [phone]   3. EBSTEPHANIE Spouse No  [phone]   4. GARETT BOB Daughter No   [phone]   5. ARREOLAPRIMITIVO Son No   [phone]           Primary language:  English     needed? No   [place] [employer]:  [phone] op. 1  Other communication barriers: None  Preferred Method of Communication:  Mail  Current living arrangement: I live in a private home with spouse  Mobility Status/ Medical Equipment: Independent w/Device    Health Maintenance  Health Maintenance Reviewed: Due/Overdue   Health Maintenance Due   Topic Date Due     HF ACTION PLAN  07/17/1932     URINE DRUG SCREEN  07/17/1932     ZOSTER IMMUNIZATION (1 of 2) 07/17/1982     MEDICARE ANNUAL WELLNESS VISIT  09/29/2017     LIPID  10/12/2017       My Access Plan  Medical Emergency 911   Primary Clinic Line [facility] - [phone]   24 Hour Appointment Line [phone] or  [phone]) (toll-free)   24 Hour Nurse Line [phone] (toll-free)   Preferred Urgent Care [facility], [phone]   Preferred Hospital [facility]  [phone]   Preferred Pharmacy [id number] - [place] - [address] AT [facility] [address]     Behavioral Health Crisis Line The National Suicide Prevention Lifeline at 1-688.836.8913 or 911             My Care Team Members  Patient Care Team       Relationship Specialty Notifications  Start End    Addy Frias MD PCP - General Internal Medicine  9/1/11     Phone: [phone] Fax: [phone] [address] [place] [address]    Autumn Ortega APRN CNP Assigned PCP   4/14/19     Phone: [phone] Fax: [phone] [address] [place] [address]    [facility] ([facility] ([place])  7/1/19     Phone: [phone]         Anjali Jackson, RN Clinic Care Coordinator Primary Care -   7/8/19     Phone: [phone] Fax: [phone]                My Care Plans  Self Management and Treatment Plan  Goals and (Comments)  Goals        General    Functional (pt-stated)     Notes - Note created  7/26/2019 12:22 PM by Anjali Jackson, RN    Goal Statement: I will continue home care physical therapy to increase my strength  Measure of Success: I will have more mobility for daily activities   Supportive Steps to Achieve: I will keep my future home care visits and use my walker for safety  Barriers: Deconditioned   Strengths: Agrees with the plan   Date to Achieve By: 8/26/2019  Patient expressed understanding of goal: Yes               Action Plans on File: None                      Advance Care Plans/Directives Type:   Type Advanced Care Plans/Directives: Advanced Directive - On File, POLST    My Medical and Care Information  Problem List   Patient Active Problem List   Diagnosis     Benign essential hypertension     Colonic polyp     Hyperlipidemia LDL goal <160     Sciatica of left side     Osteoporosis     OAB (overactive bladder)     Elevated MCV     Thrombocytopenia (H)     MGUS (monoclonal gammopathy of unknown significance)     Ascending aorta dilatation (H)     SVT (supraventricular tachycardia) (H)     Paroxysmal atrial fibrillation (H)     Long term current use of anticoagulant therapy     Cancer, metastatic to bone (H)     Multiple myeloma not having achieved remission (H)     Portacath in place     Lung nodule      Closed compression fracture of thoracic vertebra (H)     Chronic diastolic heart failure (H)     Physical deconditioning     Acute diastolic heart failure (H)     Pulmonary hypertension (H)     Atrial fibrillation with rapid ventricular response (H)     ACS (acute coronary syndrome) (H)      Current Medications and Allergies:  See printed Medication Report.    Care Coordination Start Date: 7/26/2019   Frequency of Care Coordination: monthly   Form Last Updated: 07/26/2019

## 2019-07-26 NOTE — LETTER
[place] CARE COORDINATION  [address]  [facility] [address]    July 26, 2019    Amira Arreola  [address]  [facility] [address]      Dear Amira,    I am a clinic care coordinator who works with Addy Frias MD at [facility] . I wanted to thank you for spending the time to talk with me.  I wanted to introduce myself and provide you with my contact information so that you can call me with questions or concerns about your health care. Below is a description of clinic care coordination and how I can further assist you.     The clinic care coordinator is a registered nurse and/or  who understand the health care system. The goal of clinic care coordination is to help you manage your health and improve access to the [facility] system in the most efficient manner. The registered nurse can assist you in meeting your health care goals by providing education, coordinating services, and strengthening the communication among your providers. The  can assist you with financial, behavioral, psychosocial, chemical dependency, counseling, and/or psychiatric resources.    Please feel free to contact me at [phone], with any questions or concerns. We at [facility] are focused on providing you with the highest-quality healthcare experience possible and that all starts with you.     Sincerely,     Anjali Jackson RN, Care Coordinator   [facility] -Care Coordination  [facility] , [facility]   [phone]     Enclosed: I have enclosed a copy of the Complex Care Plan. This has helpful information and goals that we have talked about. Please keep this in an easy to access place to use as needed.

## 2019-07-26 NOTE — TELEPHONE ENCOUNTER
See care coordination encounter.  This encounter closed     Anjali Jackson RN, Care Coordinator   [place] [facility] -Care Coordination  [facility] , [facility] and [facility]   [phone]

## 2019-07-26 NOTE — TELEPHONE ENCOUNTER
Skilled Nursing 1 x week for 4 weeks;  OT, PT Eval and treat.    Verbal orders given to the above orders.  She will fax orders for PCP signature.  Tameka Perez RN

## 2019-07-26 NOTE — TELEPHONE ENCOUNTER
ANTICOAGULATION MANAGEMENT     Patient Name:  Amira Arreola  Date:  2019  Contact Type:  Telephone/ JOHNNIE Vance Nurse    SUBJECTIVE:  Missed doses: No     Medication changes:   No     S/S of bleeding or thromboembolism:  No     New Injury or illness:   Yes: Recent Hospital/TCU discharge     Changes in diet or alcohol consumption:  No     Upcoming surgery, procedure or cardioversion:  No    Additional findings: Hospital visit 7/3 - ; TCU  - : Atrial fibrillation with rapid ventricular response , S/P ablation of atrial fibrillation, S/P placement of cardiac pacemaker     OBJECTIVE    INR   Date Value Ref Range Status   2019 1.4 (A) 0.8 - 1.1 Final       ASSESSMENT / PLAN      Anticoagulation Summary  As of 2019    INR goal:   2.0-3.0   TTR:   66.8 % (3 y)   INR used for dosin.4! (2019)   Warfarin maintenance plan:   4 mg (4 mg x 1) every Tue, Sat; 2 mg (4 mg x 0.5) all other days   Full warfarin instructions:   : 4 mg; : 4 mg; Otherwise 4 mg every Tue, Sat; 2 mg all other days   Weekly warfarin total:   18 mg   Plan last modified:   Jessica Moody RN (3/13/2019)   Next INR check:   2019   Target end date:   Indefinite    Indications    Long term current use of anticoagulant therapy [Z79.01]  Atrial fibrillation (H) [I48.91] (Resolved) [I48.91]             Anticoagulation Episode Summary     INR check location:       Preferred lab:       Send INR reminders to:   DANTE MOYA    Comments:    INR to be drawn at infusion visit OR home care nurse. If scheduled for Homecare, Please notify  Zhane [phone] Patient can be reached at home phone [phone]. Do not leave dosing with spouse      Anticoagulation Care Providers     Provider Role Specialty Phone number    Addy Frias MD [facility] Internal Medicine [phone]            Today's INR result of 1.4 is Subtherapeutic. Goal INR of 2.0-3.0        Warfarin Dosing Instructions: 4 mg Fri, Sat,  Sun, and 2 mg Monday      Instructed patient to follow up no later than: 3 days; [facility]    Education provided: Bhumi Vance [facility] Nurse,  verbalizes understanding and agrees to warfarin dosing plan.    Instructed to call the Anticoagulation Clinic for any changes, questions or concerns. (#[phone])     Tameka Perez RN  Anticoagulation Nurse - [facility] [place]      ?

## 2019-07-26 NOTE — TELEPHONE ENCOUNTER
To PCP:  Patient was recently discharged from Hospital and TCU for She will be seeing you Next Week for follow up appt.    She was placed on Coumadin for Atrial fibrillation with rapid ventricular response. Her coumadin/INR has been managed by TCU during stay.    Please see pended order for Clinic INR Referral.  She currently has home care services as well.    Thank you.  Tameka Perez RN  Anticoagulation Nurse - [facility]

## 2019-07-26 NOTE — TELEPHONE ENCOUNTER
7/22/2019:  Atrial fibrillation with rapid ventricular response , S/P ablation of atrial fibrillation,   S/P placement of cardiac pacemaker    Comment: pacemaker site healing without signs of infection. Rate controlled: 70-73. She was seen in [facility] Clinic 7/17/2019. Last INR was 2.0 on 7/18/2019 on coumadin 2-4 mg daily.  She is feeling well and eager to return home. Lives with her  and son, who provide 24 hr care. Family members very involved.      Plan: discharge home with family. Coumadin 2 mg tonight. INR 7/23/2019. Home services and follow up as below.       7/23/2019:  Last INR: 2.0 on 7/18/2019  INR Today:  1.8  Current Dose:  4 mg Tues and Sat, 2 mg the other days of the week

## 2019-07-26 NOTE — PROGRESS NOTES
Clinic Care Coordination Contact    Clinic Care Coordination Contact  OUTREACH    Referral Information:  Referral Source: IP Report    Primary Diagnosis: Cardiovascular - other(Metabolic encephalopathy)    Chief Complaint   Patient presents with     Clinic Care Coordination - Post Hospital     Clinic Care Coordination RN         [place] Utilization: [facility] admission 7/3-7/7/2019-- [facility]--    Metabolic encephalopathy, resolved    Hypercalcemia    Chronic atrial fibrillation with RVR    HTN    Chronic diastolic heart failure    Severe pulmonary HTN    Hx of SVT    Metastatic Multiple myeloma     Severe malnutrition    Deconditioning  Clinic Utilization  Difficulty keeping appointments:: No  Compliance Concerns: No  No-Show Concerns: No  No PCP office visit in Past Year: No  Utilization    Last refreshed: 7/25/2019  5:51 PM:  Hospital Admissions 3           Last refreshed: 7/25/2019  5:51 PM:  ED Visits 0           Last refreshed: 7/25/2019  5:51 PM:  No Show Count (past year) 3              Current as of: 7/25/2019  5:51 PM              Home Care Contact:              Home Care Agency: [facility]               Contact name () and phone number: Rajiv [phone]              Care Coordination contacted home care: Yes              Anticipated start of care date: Today CC spoke to rajiv on the phone and she is making a home visit now     Patient Contact:               Introduced self and role of care coordination.               Discharge instructions were reviewed with patient/caregiver.               Do you have any questions about your medications? Reviewed by home care RN              Follow up appointment is scheduled for 7/30/2019            Provided 24 Hour Nurse Line and/or 24 Hour Appointment Scheduling: Yes              Home care has contacted patient: Yes              Patient questions/concerns: Patient reports she is doing well  at home getting around with a walker for safety.  Patient lives with her  and her son and they assist with cooking and cleaning     Goal Statement: I will continue home care physical therapy to increase my strength  Measure of Success: I will have more mobility for daily activities   Supportive Steps to Achieve: I will keep my future home care visits and use my walker for safety  Barriers: Deconditioned   Strengths: Agrees with the plan   Date to Achieve By: 8/26/2019  Patient expressed understanding of goal: Yes     Plan:   CC introduction letter and care plan mailed today     RN/SW Care Coordinator will await notification from home care staff informing RN/SW Care Coordinator of patients discharge plans/needs. RN/SW Care Coordinator will review chart and outreach to home care every 4 weeks and as needed.      Anjali Jackson RN, Care Coordinator   [facility] -Care Coordination  [facility] , [facility] and [facility]   [phone]

## 2019-07-30 ENCOUNTER — OFFICE VISIT (OUTPATIENT)
Dept: FAMILY MEDICINE | Facility: CLINIC | Age: 84
End: 2019-07-30
Payer: MEDICARE

## 2019-07-30 ENCOUNTER — PATIENT OUTREACH (OUTPATIENT)
Dept: CARE COORDINATION | Facility: CLINIC | Age: 84
End: 2019-07-30

## 2019-07-30 ENCOUNTER — TELEPHONE (OUTPATIENT)
Dept: FAMILY MEDICINE | Facility: CLINIC | Age: 84
End: 2019-07-30

## 2019-07-30 VITALS
HEART RATE: 81 BPM | HEIGHT: 63 IN | WEIGHT: 132 LBS | OXYGEN SATURATION: 100 % | DIASTOLIC BLOOD PRESSURE: 76 MMHG | SYSTOLIC BLOOD PRESSURE: 135 MMHG | BODY MASS INDEX: 23.39 KG/M2

## 2019-07-30 DIAGNOSIS — C79.51 CANCER, METASTATIC TO BONE (H): ICD-10-CM

## 2019-07-30 DIAGNOSIS — I48.91 ATRIAL FIBRILLATION WITH RAPID VENTRICULAR RESPONSE (H): Primary | ICD-10-CM

## 2019-07-30 DIAGNOSIS — R21 RASH AND NONSPECIFIC SKIN ERUPTION: Primary | ICD-10-CM

## 2019-07-30 DIAGNOSIS — I10 BENIGN ESSENTIAL HYPERTENSION: ICD-10-CM

## 2019-07-30 DIAGNOSIS — I48.91 ATRIAL FIBRILLATION WITH RAPID VENTRICULAR RESPONSE (H): ICD-10-CM

## 2019-07-30 PROCEDURE — 99214 OFFICE O/P EST MOD 30 MIN: CPT | Performed by: INTERNAL MEDICINE

## 2019-07-30 RX ORDER — TRIAMCINOLONE ACETONIDE 1 MG/G
CREAM TOPICAL 2 TIMES DAILY
Qty: 15 G | Refills: 1 | Status: SHIPPED | OUTPATIENT
Start: 2019-07-30 | End: 2022-01-01

## 2019-07-30 ASSESSMENT — MIFFLIN-ST. JEOR: SCORE: 1002.88

## 2019-07-30 ASSESSMENT — ACTIVITIES OF DAILY LIVING (ADL): DEPENDENT_IADLS:: CLEANING;COOKING;TRANSPORTATION;LAUNDRY;SHOPPING

## 2019-07-30 NOTE — PATIENT INSTRUCTIONS
Stop the benadryl (diphenhydramine)    Start the skin cream for the rash    Call if you get increasing shortness of breath, swelling or weight gain over 3 pounds    Follow up with cardiology and oncology    See me in 3 months    Addy Frias M.D.

## 2019-07-30 NOTE — TELEPHONE ENCOUNTER
Called former Home [facility]  Zhane at [phone]. She states that this pt is now being followed by [facility]. Her number is [phone].    Zhane reports that she has an INR result today for this pt and will call it in to central INR at [phone].

## 2019-07-30 NOTE — PROGRESS NOTES
Subjective     Amira Arreola is a 87 year old female who presents to clinic today for the following health issues:    Hospitals in [place]       Hospital Follow-up Visit:    Hospital/Nursing Home/IP Rehab Facility:   Date of Admission: 7/18/19  Date of Discharge: 7/22/19  Reason(s) for Admission: low blood pressure, afib, confusion            Problems taking medications regularly:  None       Medication changes since discharge: None       Problems adhering to non-medication therapy:  None    Summary of hospitalization:  [facility] discharge summary reviewed and tcu discontinue note  Diagnostic Tests/Treatments reviewed.  Follow up needed: none  Other Healthcare Providers Involved in Patient s Care:         Specialist appointment - onc next week, cards late August  Update since discharge: improved.     Post Discharge Medication Reconciliation: discharge medications reconciled, continue medications without change.  Plan of care communicated with patient and family     Coding guidelines for this visit:  Type of Medical   Decision Making Face-to-Face Visit       within 7 Days of discharge Face-to-Face Visit        within 14 days of discharge   Moderate Complexity [id number]   High Complexity [id number]          The patient presents with her son for hospital follow-up.  I reviewed the July 22 discharge note from the [facility] center as well as a hospital discharge note.    As noted, the patient was admitted to [facility] from the cardiology clinic with hypotension, A. fib and altered mental status.  She was in A. fib with rapid ventricular response and required a diltiazem drip.  Because of the hypotension meds were discontinued and an ablation with pacemaker was done on July 5.  Chest x-ray showed a left pleural effusion and she had a thoracentesis done which was a transudate.  She also has myeloma but chemo has been on hold.  Her calcium level during the hospital was elevated but responded to IV fluids.    Patient also  has a history of heart failure and pulmonary hypertension.  She has had follow-up since discharge with cardiology and has been stable.  She is on a lower dose of Lasix and prior to admission but her weight is been stable.    As noted she has myeloma.  She uses Percocet as needed and has follow-up soon with oncology.    In terms of her cognitive impairment the son notes that she has had memory issues for at least 6 months.  She appears to be at her baseline at this time.  The patient actually had a prior hospital stay    At the end of June as well.    At this time the patient is doing relatively well.  She has no headaches or dizziness.  Not falling.  No chest pain or shortness of breath or change in her pedal edema.  No GI symptoms.    Because of her rash on her left upper chest.  Rash is been present off and on for quite some time.    Past Medical History:   Diagnosis Date     Abnormal CXR 2018    then ct done and not significant     Acute diastolic heart failure (H) 03/22/2019    nl ef, 2+mr and tr with severe pulm htn     Ascending aorta dilatation (H) 04/2016    on echo, mild, fu 7/18 4.0, slightly larger     Cancer, metastatic to bone (H)     due to myeloma     Colonic polyp 2008    adenomatous, fu 2013 tics only     Compression fracture 2016    multiple areas of spine     Dry eyes      Elevated MCV 2015    b12 and folic acid nl     HTN (hypertension) 2000    off meds for years     Lung nodule 08/2018    on ct, 4mm, ct done for fu abnl cxr     Menorrhagia 2002    hysteroscopy and d and c done     MGUS (monoclonal gammopathy of unknown significance) 2015    eval by Dr. Roberts     Multiple myeloma (H) 2016    dx 5/16 at [facility], bone lesions seen on mri 6/16     OAB (overactive bladder) 2013    Dr. Grullon     Osteoporosis     fu done 2010 and stable, went off meds then, fu done 2013; has had gyn fu and added evista 2013 by gyn     Palpitations 4/16    nl echo, mildly dilated asc aorta     Paroxysmal atrial  fibrillation (H)     had palp and ziopatch showed it, echo nl lv fxn, mild mr and tr, added coum and toprol, toprol dose raised 16     Pulmonary hypertension (H) 2019    seen on echo     Sciatica of left side     Dr. Helen Ricardo      SVT (supraventricular tachycardia) (H)     on ziopatch     Thrombocytopenia (H)      Past Surgical History:   Procedure Laterality Date     BONE MARROW BIOPSY, BONE SPECIMEN, NEEDLE/TROCAR N/A 2016    Procedure: BIOPSY BONE MARROW;  Surgeon: Bryan Patel MD;  Location:  GI     CATARACT IOL, RT/LT        SECTION  1965, 1966     COLONOSCOPY  2013    Procedure: COLONOSCOPY;  COLONOSCOPY;  Surgeon: Steffany Rockwell MD;  Location:  GI     EP ABLATION AV NODE N/A 2019    Procedure: EP Ablation AV Node;  Surgeon: Lee Menchaca MD;  Location:  HEART CARDIAC CATH LAB     EP PACEMAKER N/A 2019    Procedure: EP Pacemaker;  Surgeon: Lee Menchaca MD;  Location:  HEART CARDIAC CATH LAB     EXCISE EXOSTOSIS TIBIA / FIBULA  2014    Procedure: EXCISE EXOSTOSIS TIBIA / FIBULA;  Surgeon: Naila Pichardo MD;  Location:  SD     hysteroscopy and d and c      due to bleeding     left anle replacement       right ankle surgery       Social History     Socioeconomic History     Marital status:      Spouse name: Tom     Number of children: 6     Years of education: Not on file     Highest education level: Not on file   Occupational History     Occupation: rn anesthetist     Employer: RETIRED   Social Needs     Financial resource strain: Not on file     Food insecurity:     Worry: Not on file     Inability: Not on file     Transportation needs:     Medical: Not on file     Non-medical: Not on file   Tobacco Use     Smoking status: Never Smoker     Smokeless tobacco: Never Used   Substance and Sexual Activity     Alcohol use: No     Alcohol/week: 0.0 oz     Drug use: No     Sexual  activity: Never   Lifestyle     Physical activity:     Days per week: Not on file     Minutes per session: Not on file     Stress: Not on file   Relationships     Social connections:     Talks on phone: Not on file     Gets together: Not on file     Attends Latter-day service: Not on file     Active member of club or organization: Not on file     Attends meetings of clubs or organizations: Not on file     Relationship status: Not on file     Intimate partner violence:     Fear of current or ex partner: Not on file     Emotionally abused: Not on file     Physically abused: Not on file     Forced sexual activity: Not on file   Other Topics Concern     Parent/sibling w/ CABG, MI or angioplasty before 65F 55M? Not Asked   Social History Narrative     Not on file     Current Outpatient Medications   Medication Sig Dispense Refill     acetaminophen (TYLENOL) 500 MG tablet Take 500 mg by mouth every 6 hours as needed for mild pain        acyclovir (ZOVIRAX) 400 MG tablet Take 1 tablet (400 mg) by mouth 2 times daily 60 tablet 3     Carboxymethylcellulose Sod PF (REFRESH PLUS) 0.5 % SOLN ophthalmic solution 1 drop 4 times daily as needed for dry eyes       dexamethasone (DECADRON) 4 MG tablet Take 20mg (5 tablets) by mouth every week. 28 tablet 3     furosemide (LASIX) 20 MG tablet Take 1 tablet (20 mg) by mouth daily       latanoprost (XALATAN) 0.005 % ophthalmic solution Place 1 drop into the right eye At Bedtime       lidocaine-prilocaine (EMLA) cream Apply to port site 1 hour prior to access 30 g 1     Multiple Vitamins-Minerals (DAILY MULTIVITAMIN) CAPS Take 1 tablet by mouth daily        oxyCODONE-acetaminophen (PERCOCET) 5-325 MG tablet Take 1 tablet by mouth every 4 hours as needed for breakthrough pain 30 tablet 0     polyethylene glycol (MIRALAX/GLYCOLAX) powder Take 17 g by mouth daily as needed for constipation        potassium chloride (KLOR-CON) 20 MEQ packet Take 20 mEq by mouth daily 60 packet 1      "prochlorperazine (COMPAZINE) 10 MG tablet Take 1 tablet (10 mg) by mouth every 6 hours as needed for nausea or vomiting 30 tablet 1     SIMBRINZA 1-0.2 % ophthalmic suspension Place 1 drop into the right eye 2 times daily 1 drop AM and PM  2     Sodium Fluoride (SF 5000 PLUS) 1.1 % CREA Apply to affected area 3 times daily       triamcinolone (KENALOG) 0.1 % external cream Apply topically 2 times daily 15 g 1     warfarin (COUMADIN) 4 MG tablet Take 0.5-1 tablets (2-4 mg) by mouth daily As directed by INR clinic 90 tablet 0     warfarin (COUMADIN) 4 MG tablet Take one tablet (4 mg) by mouth on Tuesdays, Thursdays  and Saturdays; take one-half tablet ( 2 mg) on all other days of the week.       Allergies   Allergen Reactions     Blood Transfusion Related (Informational Only)      Blood antigens related to receiving Darzelex-AG     Penicillin [Penicillins] Rash     Blotches on chest      FAMILY HISTORY NOTED AND REVIEWED    REVIEW OF SYSTEMS: above    PHYSICAL EXAM    /76   Pulse 81   Ht 1.6 m (5' 3\")   Wt 59.9 kg (132 lb)   SpO2 100%   BMI 23.38 kg/m       Patient appears non toxic  Lungs dec left base  cv reglar rate and rhythm, no jvp, 1+bipedal edema  Abdomen non-tender  She has a slightly raised red rash on her left upper chest.  The patient has been using Benadryl at night    ASSESSMENT:  1. afib with rvr, s/p ablation and pacer, doing well  2. Myeloma, follow up onc  3. Chf, stable  4. Cognitive issues, at baseline  5. Elevated calcium, due to myeloma, dehydration  6. Hypertension, on less blood pressure med now  7. Severe pulm hypertension  8. Skin rash, ?eczema    PLAN:  No change in meds x stop the benadryl  Call if problems  Follow up specialists  See me 3 months  Triamcinolone for the rash    Addy Frias M.D.        "

## 2019-07-30 NOTE — PROGRESS NOTES
Clinic Care Coordination Contact    Follow Up Progress Note      Assessment: CC met with patient and her son after office visit today.  Patient very pleasant and presents with a rolling seated walker.  Patient continues home care services      Goals addressed this encounter:   Goals Addressed                 This Visit's Progress      Functional (pt-stated)   On track     Goal Statement: I will continue home care physical therapy to increase my strength  Measure of Success: I will have more mobility for daily activities   Supportive Steps to Achieve: I will keep my future home care visits and use my walker for safety  Barriers: Deconditioned   Strengths: Agrees with the plan   Date to Achieve By: 8/26/2019  Patient expressed understanding of goal: Yes               Intervention/Education provided during outreach: LIZETH business card given to patient      Outreach Frequency: monthly    Plan:     Care Coordinator will follow up when patient is  discharged from home care services     Anjali Jackson RN, Care Coordinator   [facility] -Care Coordination  [facility] , [facility] and [facility]   [phone]

## 2019-07-31 ENCOUNTER — TELEPHONE (OUTPATIENT)
Dept: FAMILY MEDICINE | Facility: CLINIC | Age: 84
End: 2019-07-31

## 2019-07-31 NOTE — TELEPHONE ENCOUNTER
Reason for Call:  Home Health Care    Savana with [facility] called regarding (reason for call): extension for OT    Orders are needed for this patient. OT    PT: na    OT: extend eval through next week, pt overwhelmed w/home care visits this week and wants to wait until next week    Skilled Nursing: na    Pt Provider: Altagracia    Phone Number Homecare Nurse can be reached at: [phone]    Can we leave a detailed message on this number? YES    Phone number patient can be reached at: Home number on file [phone] (home)    Best Time: any    Call taken on 7/31/2019 at 1:42 PM by Vilma Rios

## 2019-07-31 NOTE — TELEPHONE ENCOUNTER
Verbal approval given per request below.Homecare/hospice agency to fax orders for provider signature.     Kat WHITATKER RN

## 2019-08-02 ENCOUNTER — TELEPHONE (OUTPATIENT)
Dept: FAMILY MEDICINE | Facility: CLINIC | Age: 84
End: 2019-08-02

## 2019-08-02 LAB — INR PPP: 2.6 (ref 0.8–1.1)

## 2019-08-02 NOTE — TELEPHONE ENCOUNTER
ANTICOAGULATION MANAGEMENT     Patient Name:  Amira Arreola  Date:  2019  Contact Type:  Telephone/ Spoke with Ramona BLAIR Home care nurse    SUBJECTIVE:  Missed doses: No     Medication changes:   No     S/S of bleeding or thromboembolism:  No     New Injury or illness:   No     Changes in diet or alcohol consumption:  No     Upcoming surgery, procedure or cardioversion:  No    Additional findings: only took 2 mg on Tuesday since INR was not done at clinic.     OBJECTIVE    INR   Date Value Ref Range Status   2019 2.6 (A) 0.8 - 1.1 Final       ASSESSMENT / PLAN      Anticoagulation Summary  As of 2019    INR goal:   2.0-3.0   TTR:   66.7 % (3 y)   INR used for dosin.6 (2019)   Warfarin maintenance plan:   4 mg (4 mg x 1) every Tue, Sat; 2 mg (4 mg x 0.5) all other days   Full warfarin instructions:   4 mg every Tue, Sat; 2 mg all other days   Weekly warfarin total:   18 mg   Plan last modified:   Jessica Charlton RN (3/13/2019)   Next INR check:   2019   Target end date:   Indefinite    Indications    Long term current use of anticoagulant therapy [Z79.01]  Atrial fibrillation (H) [I48.91] (Resolved) [I48.91]             Anticoagulation Episode Summary     INR check location:       Preferred lab:       Send INR reminders to:   DANTE MOYA    Comments:    INR to be drawn at infusion visit OR home care nurse. If scheduled for Homecare, Please notify  Zhane [phone] Patient can be reached at home phone [phone]. Do not leave dosing with spouse      Anticoagulation Care Providers     Provider Role Specialty Phone number    Addy Frias MD [facility] Internal Medicine [phone]            Today's INR result of 2.6 is Therapeutic. Goal INR of 2.0-3.0        Warfarin Dosing Instructions: 4 mg  and  and 2 mg all other days.      Instructed patient to follow up no later than: 1 week    Education provided: Bhumi Greene verbalizes understanding and  agrees to warfarin dosing plan.    Instructed to call the Anticoagulation Clinic for any changes, questions or concerns. (#[phone])         ?

## 2019-08-05 ENCOUNTER — TELEPHONE (OUTPATIENT)
Dept: FAMILY MEDICINE | Facility: CLINIC | Age: 84
End: 2019-08-05

## 2019-08-05 NOTE — TELEPHONE ENCOUNTER
Home care referral in place; recent OV.    Okay for orders below; left message for home care nurse.  Tigist Corral RN on 8/5/2019 at 1:03 PM

## 2019-08-05 NOTE — TELEPHONE ENCOUNTER
Reason for Call:  Home Health Care    Tayla with [facility] called regarding (reason for call): Orders    PT: Renew for Eval & Treat   Patient refused last week     Phone Number Homecare Nurse can be reached at: [phone]    Can we leave a detailed message on this number? YES      Call taken on 8/5/2019 at 12:27 PM by Vera Fajardo

## 2019-08-07 ENCOUNTER — INFUSION THERAPY VISIT (OUTPATIENT)
Dept: INFUSION THERAPY | Facility: CLINIC | Age: 84
End: 2019-08-07
Attending: INTERNAL MEDICINE
Payer: MEDICARE

## 2019-08-07 ENCOUNTER — ONCOLOGY VISIT (OUTPATIENT)
Dept: ONCOLOGY | Facility: CLINIC | Age: 84
End: 2019-08-07
Attending: INTERNAL MEDICINE
Payer: MEDICARE

## 2019-08-07 ENCOUNTER — HOSPITAL ENCOUNTER (OUTPATIENT)
Facility: CLINIC | Age: 84
Setting detail: SPECIMEN
Discharge: HOME | End: 2019-08-07
Attending: INTERNAL MEDICINE | Admitting: INTERNAL MEDICINE
Payer: MEDICARE

## 2019-08-07 ENCOUNTER — TELEPHONE (OUTPATIENT)
Dept: FAMILY MEDICINE | Facility: CLINIC | Age: 84
End: 2019-08-07

## 2019-08-07 VITALS
DIASTOLIC BLOOD PRESSURE: 63 MMHG | SYSTOLIC BLOOD PRESSURE: 99 MMHG | TEMPERATURE: 98.1 F | HEART RATE: 71 BPM | RESPIRATION RATE: 16 BRPM

## 2019-08-07 VITALS
RESPIRATION RATE: 16 BRPM | SYSTOLIC BLOOD PRESSURE: 99 MMHG | TEMPERATURE: 98.1 F | DIASTOLIC BLOOD PRESSURE: 63 MMHG | HEART RATE: 71 BPM

## 2019-08-07 DIAGNOSIS — C90.00 MULTIPLE MYELOMA NOT HAVING ACHIEVED REMISSION (H): ICD-10-CM

## 2019-08-07 DIAGNOSIS — Z79.01 LONG TERM CURRENT USE OF ANTICOAGULANT THERAPY: ICD-10-CM

## 2019-08-07 DIAGNOSIS — Z95.828 PORT-A-CATH IN PLACE: Primary | ICD-10-CM

## 2019-08-07 DIAGNOSIS — I48.0 PAROXYSMAL ATRIAL FIBRILLATION (H): ICD-10-CM

## 2019-08-07 DIAGNOSIS — C90.00 MULTIPLE MYELOMA NOT HAVING ACHIEVED REMISSION (H): Primary | ICD-10-CM

## 2019-08-07 DIAGNOSIS — C79.51 CANCER, METASTATIC TO BONE (H): ICD-10-CM

## 2019-08-07 LAB
ALBUMIN SERPL-MCNC: 3.2 G/DL (ref 3.4–5)
ALP SERPL-CCNC: 50 U/L (ref 40–150)
ALT SERPL W P-5'-P-CCNC: 15 U/L (ref 0–50)
ANION GAP SERPL CALCULATED.3IONS-SCNC: 7 MMOL/L (ref 3–14)
AST SERPL W P-5'-P-CCNC: 20 U/L (ref 0–45)
BASOPHILS # BLD AUTO: 0 10E9/L (ref 0–0.2)
BASOPHILS NFR BLD AUTO: 0.1 %
BILIRUB SERPL-MCNC: 0.9 MG/DL (ref 0.2–1.3)
BUN SERPL-MCNC: 15 MG/DL (ref 7–30)
CALCIUM SERPL-MCNC: 9 MG/DL (ref 8.5–10.1)
CHLORIDE SERPL-SCNC: 110 MMOL/L (ref 94–109)
CO2 SERPL-SCNC: 24 MMOL/L (ref 20–32)
CREAT SERPL-MCNC: 0.72 MG/DL (ref 0.52–1.04)
DIFFERENTIAL METHOD BLD: ABNORMAL
EOSINOPHIL # BLD AUTO: 0 10E9/L (ref 0–0.7)
EOSINOPHIL NFR BLD AUTO: 0 %
ERYTHROCYTE [DISTWIDTH] IN BLOOD BY AUTOMATED COUNT: 14.7 % (ref 10–15)
GFR SERPL CREATININE-BSD FRML MDRD: 75 ML/MIN/{1.73_M2}
GLUCOSE SERPL-MCNC: 176 MG/DL (ref 70–99)
HCT VFR BLD AUTO: 36.8 % (ref 35–47)
HGB BLD-MCNC: 11.9 G/DL (ref 11.7–15.7)
IMM GRANULOCYTES # BLD: 0 10E9/L (ref 0–0.4)
IMM GRANULOCYTES NFR BLD: 0.3 %
INR PPP: 1.88 (ref 0.86–1.14)
LYMPHOCYTES # BLD AUTO: 0.3 10E9/L (ref 0.8–5.3)
LYMPHOCYTES NFR BLD AUTO: 2.4 %
MCH RBC QN AUTO: 31.3 PG (ref 26.5–33)
MCHC RBC AUTO-ENTMCNC: 32.3 G/DL (ref 31.5–36.5)
MCV RBC AUTO: 97 FL (ref 78–100)
MONOCYTES # BLD AUTO: 0.2 10E9/L (ref 0–1.3)
MONOCYTES NFR BLD AUTO: 1.9 %
NEUTROPHILS # BLD AUTO: 9.9 10E9/L (ref 1.6–8.3)
NEUTROPHILS NFR BLD AUTO: 95.3 %
NRBC # BLD AUTO: 0 10*3/UL
NRBC BLD AUTO-RTO: 0 /100
PLATELET # BLD AUTO: 180 10E9/L (ref 150–450)
POTASSIUM SERPL-SCNC: 3.8 MMOL/L (ref 3.4–5.3)
PROT SERPL-MCNC: 5.8 G/DL (ref 6.8–8.8)
RBC # BLD AUTO: 3.8 10E12/L (ref 3.8–5.2)
SODIUM SERPL-SCNC: 141 MMOL/L (ref 133–144)
WBC # BLD AUTO: 10.4 10E9/L (ref 4–11)

## 2019-08-07 PROCEDURE — 84165 PROTEIN E-PHORESIS SERUM: CPT | Performed by: INTERNAL MEDICINE

## 2019-08-07 PROCEDURE — 85025 COMPLETE CBC W/AUTO DIFF WBC: CPT | Performed by: INTERNAL MEDICINE

## 2019-08-07 PROCEDURE — G0463 HOSPITAL OUTPT CLINIC VISIT: HCPCS

## 2019-08-07 PROCEDURE — 82784 ASSAY IGA/IGD/IGG/IGM EACH: CPT | Performed by: INTERNAL MEDICINE

## 2019-08-07 PROCEDURE — 00000402 ZZHCL STATISTIC TOTAL PROTEIN: Performed by: INTERNAL MEDICINE

## 2019-08-07 PROCEDURE — 83883 ASSAY NEPHELOMETRY NOT SPEC: CPT | Performed by: INTERNAL MEDICINE

## 2019-08-07 PROCEDURE — 25000128 H RX IP 250 OP 636: Performed by: INTERNAL MEDICINE

## 2019-08-07 PROCEDURE — 86334 IMMUNOFIX E-PHORESIS SERUM: CPT | Performed by: INTERNAL MEDICINE

## 2019-08-07 PROCEDURE — 36591 DRAW BLOOD OFF VENOUS DEVICE: CPT

## 2019-08-07 PROCEDURE — 99214 OFFICE O/P EST MOD 30 MIN: CPT | Performed by: INTERNAL MEDICINE

## 2019-08-07 PROCEDURE — 85610 PROTHROMBIN TIME: CPT | Performed by: INTERNAL MEDICINE

## 2019-08-07 PROCEDURE — 80053 COMPREHEN METABOLIC PANEL: CPT | Performed by: INTERNAL MEDICINE

## 2019-08-07 RX ORDER — HEPARIN SODIUM (PORCINE) LOCK FLUSH IV SOLN 100 UNIT/ML 100 UNIT/ML
500 SOLUTION INTRAVENOUS EVERY 8 HOURS
Status: DISCONTINUED | OUTPATIENT
Start: 2019-08-07 | End: 2019-08-07 | Stop reason: HOSPADM

## 2019-08-07 RX ORDER — OXYCODONE AND ACETAMINOPHEN 5; 325 MG/1; MG/1
1 TABLET ORAL EVERY 4 HOURS PRN
Qty: 30 TABLET | Refills: 0 | Status: SHIPPED | OUTPATIENT
Start: 2019-08-07 | End: 2019-09-19

## 2019-08-07 RX ORDER — HEPARIN SODIUM (PORCINE) LOCK FLUSH IV SOLN 100 UNIT/ML 100 UNIT/ML
500 SOLUTION INTRAVENOUS EVERY 8 HOURS
Status: CANCELLED
Start: 2019-08-07

## 2019-08-07 RX ADMIN — HEPARIN SODIUM (PORCINE) LOCK FLUSH IV SOLN 100 UNIT/ML 500 UNITS: 100 SOLUTION at 13:41

## 2019-08-07 ASSESSMENT — PAIN SCALES - GENERAL
PAINLEVEL: NO PAIN (0)
PAINLEVEL: NO PAIN (0)

## 2019-08-07 NOTE — TELEPHONE ENCOUNTER
Reason for Call:  Home Health Care    Tayla  with [facility] called regarding (reason for call):     Orders are needed for this patient.     PT: 2x week for 2 weeks, 1x week for 1 week, for   Gait and transfer training and home exercise program and falls prevention plan.     Pt Provider: Altagracia    Phone Number Homecare Nurse can be reached at: [phone]    Can we leave a detailed message on this number?  yes    Call taken on 8/7/2019 at 1:07 PM by Joslyn Dunham  .

## 2019-08-07 NOTE — Clinical Note
"    8/7/2019         RE: Amira Arreola  [address]  [place] [address]        Dear Colleague,    Thank you for referring your patient, Amira Arreola, to the [facility]. Please see a copy of my visit note below.    Oncology Rooming Note    August 7, 2019 1:02 PM   Amira Arreola is a 87 year old female who presents for:    Chief Complaint   Patient presents with     Oncology Clinic Visit     Initial Vitals: BP 99/63   Pulse 71   Temp 98.1  F (36.7  C) (Oral)   Resp 16  Estimated body mass index is 23.38 kg/m  as calculated from the following:    Height as of 7/30/19: 1.6 m (5' 3\").    Weight as of 7/30/19: 59.9 kg (132 lb). There is no height or weight on file to calculate BSA.  No Pain (0) Comment: Data Unavailable   No LMP recorded. Patient is postmenopausal.  Allergies reviewed: Yes  Medications reviewed: Yes    Medications: MEDICATION REFILLS NEEDED TODAY. Provider was notified.  Pharmacy name entered into e[employer] by [employer]:    [id number] [place] - [address] AT [facility] [address]  [facility] - [place], [place] - [address]    Clinical concerns:  doctor was notified.  REFILLS ONOXYCODONE    Libertad Pritchard, [facility]              Visit Date:   08/07/2019      SUBJECTIVE:  Ms. Arreola is an 87-year-old female with kappa free light chain multiple myeloma.  The patient was on treatment with Velcade, daratumumab and dexamethasone.  Because of worsening fatigue, we are holding the treatment.  Last Velcade was on 06/05/2019.  Last daratumumab was on 05/22/2019.  She has continued weekly dexamethasone.      Overall, the patient's condition is stable.  She has fatigue.  She is still able to take care of herself.  She can do daily activities of living.  Fatigue has not gotten worse.      No headache.  Some lightheadedness.  She has chronic upper back pain.  The patient takes Percocet which helps.  She wants a refill on that.      The patient " has been admitted to the hospital twice in the last 3 months.  The patient does have multiple medical problems including cardiac disease.  She is seeing a cardiologist.      No abdominal pain, nausea or vomiting.  Appetite fairly good.  She has pedal edema.  No worsening of it.  No urinary or bowel complaints.  No bleeding.  She is on warfarin.  No bleeding complication.      PHYSICAL EXAMINATION:  Unchanged.      LABORATORY DATA:  Reviewed.      ASSESSMENT:   1.  An 87-year-old female with kappa free light chain multiple myeloma.  Myeloma is stable.  She is not on any treatment except weekly dexamethasone.   2.  Chronic back pain, stable.   3.  Fatigue, stable.   4.  Multiple other medical problems including cardiac disease.      PLAN:   1.  I had a long discussion with the patient.  Her family members were present.  Her daughter who is an ophthalmologist was on the phone.      I discussed with the patient regarding myeloma.  The patient clinically is stable.  Labs from today reveal normal WBC, hemoglobin and platelets.  Her calcium and creatinine are normal.  When the patient was in the clinic, we did not have the result of kappa free light chain.      We discussed regarding treatment for myeloma.  At this time, I would recommend observation.  The patient recently has been hospitalized twice to the hospital.  Starting any treatment for myeloma will cause side effects and deterioration in her condition.      I told the patient that at this time I would like to monitor.  When there is worsening of myeloma, we will introduce other treatment.  She will continue on weekly dexamethasone.  In future, we can add Velcade or daratumumab or both.  The patient is agreeable for this plan.      I told the patient that she has kappa free light chain myeloma.  [place] free light chain used to be around 60.  Lately it has been remaining less than 3.  My plan will be to resume treatment when kappa free light chain is more than 10.    2.  Family had multiple questions, which were all answered.  They are agreeable with the plan of monitoring the patient and starting treatment when there is significant progression.   3.  The patient will continue on oxycodone for pain.  Side effects including sedation and addiction were discussed.   4.  I will see her in 2 months' time.  Family advised to bring the patient back sooner if there is worsening weakness, worsening pain, weight loss, recurrent infection or any other concerns.      Total face-to-face time spent 30 minutes, more than 50% of the time spent in counseling and coordination of care.         ITZ LOPEZ MD             D: 2019   T: 2019   MT: SYLVESTER      Name:     ALBERTO PARSON   MRN:      [id number]        Account:      [id number]   :      1932           Visit Date:   2019      Document: [id number]       Again, thank you for allowing me to participate in the care of your patient.        Sincerely,        Itz Lopez MD

## 2019-08-07 NOTE — PROGRESS NOTES
"Oncology Rooming Note    August 7, 2019 1:02 PM   Amira Arreola is a 87 year old female who presents for:    Chief Complaint   Patient presents with     Oncology Clinic Visit     Initial Vitals: BP 99/63   Pulse 71   Temp 98.1  F (36.7  C) (Oral)   Resp 16  Estimated body mass index is 23.38 kg/m  as calculated from the following:    Height as of 7/30/19: 1.6 m (5' 3\").    Weight as of 7/30/19: 59.9 kg (132 lb). There is no height or weight on file to calculate BSA.  No Pain (0) Comment: Data Unavailable   No LMP recorded. Patient is postmenopausal.  Allergies reviewed: Yes  Medications reviewed: Yes    Medications: MEDICATION REFILLS NEEDED TODAY. Provider was notified.  Pharmacy name entered into [facility]:    [id number] [address] [facility] [address]  [facility] - [facility] [address]    Clinical concerns:  doctor was notified.  REFILLS ONOXYCODONE    Libertad Pritchard CMA            "

## 2019-08-07 NOTE — LETTER
Patient:  Amira Arreola  :   1932  MRN:     [id number]        Ms.Marilyn IVY Arreola  [address]  [place] [address]        2019    Dear ,    We are writing to inform you of your test results.    [place] free light chain is 2.64. It is still very low. Will continue to monitor it.    Shayne Aleman Orders   Protein Immunofixation Serum   Result Value Ref Range    Immunofixation ELP (Note)       Comment:      Very small monoclonal IgG immunoglobulin of kappa light chain type.  Monoclonal antibody therapeutics (e.g. Daratumumab) may appear as  monoclonal proteins on serum electrophoresis and immunofixation.  Results should be interpreted with caution if the patient is  receiving monoclonal antibody therapy. Pathological significance  requires clinical correlation.  JEREMIAS Moe M.D., Ph.D.([phone])       (L) 695 - 1,620 mg/dL    IGA 12 (L) 70 - 380 mg/dL    IGM 8 (L) 60 - 265 mg/dL   Protein electrophoresis   Result Value Ref Range    Albumin Fraction 3.4 (L) 3.7 - 5.1 g/dL    Alpha 1 Fraction 0.4 0.2 - 0.4 g/dL    Alpha 2 Fraction 0.8 0.5 - 0.9 g/dL    Beta Fraction 0.7 0.6 - 1.0 g/dL    Gamma Fraction 0.2 (L) 0.7 - 1.6 g/dL    Monoclonal Peak 0.0 0.0 g/dL    ELP Interpretation:       Hypoalbuminemia and marked hypogammaglobulinemia. No monoclonal protein seen. Recommend a   urine for immunofixation to rule out a light chain secreting myeloma if myeloma is a   serious clinical consideration. Pathologic significance requires clinical correlation.    JEREMIAS Moe M.D., Ph.D., Pathologist ().      Comprehensive metabolic panel   Result Value Ref Range    Sodium 141 133 - 144 mmol/L    Potassium 3.8 3.4 - 5.3 mmol/L    Chloride 110 (H) 94 - 109 mmol/L    Carbon Dioxide 24 20 - 32 mmol/L    Anion Gap 7 3 - 14 mmol/L    Glucose 176 (H) 70 - 99 mg/dL    Urea Nitrogen 15 7 - 30 mg/dL    Creatinine 0.72 0.52 - 1.04 mg/dL    GFR Estimate 75 >60  mL/min/[1.73_m2]      Comment:      Non  GFR Calc  Starting 12/18/2018, serum creatinine based estimated GFR (eGFR) will be   calculated using the Chronic Kidney Disease Epidemiology Collaboration   (CKD-EPI) equation.      GFR Estimate If Black 87 >60 mL/min/[1.73_m2]      Comment:       GFR Calc  Starting 12/18/2018, serum creatinine based estimated GFR (eGFR) will be   calculated using the Chronic Kidney Disease Epidemiology Collaboration   (CKD-EPI) equation.      Calcium 9.0 8.5 - 10.1 mg/dL    Bilirubin Total 0.9 0.2 - 1.3 mg/dL    Albumin 3.2 (L) 3.4 - 5.0 g/dL    Protein Total 5.8 (L) 6.8 - 8.8 g/dL    Alkaline Phosphatase 50 40 - 150 U/L    ALT 15 0 - 50 U/L    AST 20 0 - 45 U/L   CBC with platelets differential   Result Value Ref Range    WBC 10.4 4.0 - 11.0 10e9/L    RBC Count 3.80 3.8 - 5.2 10e12/L    Hemoglobin 11.9 11.7 - 15.7 g/dL    Hematocrit 36.8 35.0 - 47.0 %    MCV 97 78 - 100 fl    MCH 31.3 26.5 - 33.0 pg    MCHC 32.3 31.5 - 36.5 g/dL    RDW 14.7 10.0 - 15.0 %    Platelet Count 180 150 - 450 10e9/L    Diff Method Automated Method     % Neutrophils 95.3 %    % Lymphocytes 2.4 %    % Monocytes 1.9 %    % Eosinophils 0.0 %    % Basophils 0.1 %    % Immature Granulocytes 0.3 %    Nucleated RBCs 0 0 /100    Absolute Neutrophil 9.9 (H) 1.6 - 8.3 10e9/L    Absolute Lymphocytes 0.3 (L) 0.8 - 5.3 10e9/L    Absolute Monocytes 0.2 0.0 - 1.3 10e9/L    Absolute Eosinophils 0.0 0.0 - 0.7 10e9/L    Absolute Basophils 0.0 0.0 - 0.2 10e9/L    Abs Immature Granulocytes 0.0 0 - 0.4 10e9/L    Absolute Nucleated RBC 0.0    [place]/Lambda Free Light Chains Quantitative   Result Value Ref Range    Kappa Free Light Chains 2.64 (H) 0.33 - 1.94 mg/dL    LAMBDA FREE LT CHAINS <0.16 (L) 0.57 - 2.63 mg/dL    KAPPA/LAMBDA RATIO SEE NOTE 0.26 - 1.65      Comment:      (Note)  Unable to calculate ratio due to undetectable concentration   of free kappa and/or free lambda light chains.  Performed by  [facility],  [address] [place] [id number] [phone]  www.[employer], Donald Mae MD, Lab. Director     INR   Result Value Ref Range    INR 1.88 (H) 0.86 - 1.14       [id number]  7/17/1932

## 2019-08-07 NOTE — TELEPHONE ENCOUNTER
Called Tayla with [facility] to provide verbal orders for PT as requested    Yaakov GOETZ RN

## 2019-08-07 NOTE — PATIENT INSTRUCTIONS
Continue weekly dexamethasone.  Continue acyclovir.  Follow up in 2 months with labs.    Back in infusion

## 2019-08-08 ENCOUNTER — TELEPHONE (OUTPATIENT)
Dept: FAMILY MEDICINE | Facility: CLINIC | Age: 84
End: 2019-08-08

## 2019-08-08 LAB
ALBUMIN SERPL ELPH-MCNC: 3.4 G/DL (ref 3.7–5.1)
ALPHA1 GLOB SERPL ELPH-MCNC: 0.4 G/DL (ref 0.2–0.4)
ALPHA2 GLOB SERPL ELPH-MCNC: 0.8 G/DL (ref 0.5–0.9)
B-GLOBULIN SERPL ELPH-MCNC: 0.7 G/DL (ref 0.6–1)
GAMMA GLOB SERPL ELPH-MCNC: 0.2 G/DL (ref 0.7–1.6)
IGA SERPL-MCNC: 12 MG/DL (ref 70–380)
IGG SERPL-MCNC: 199 MG/DL (ref 695–1620)
IGM SERPL-MCNC: 8 MG/DL (ref 60–265)
M PROTEIN SERPL ELPH-MCNC: 0 G/DL
PROT PATTERN SERPL ELPH-IMP: ABNORMAL
PROT PATTERN SERPL IFE-IMP: ABNORMAL

## 2019-08-08 NOTE — TELEPHONE ENCOUNTER
Reason for Call:  Home Health Care    Twila -OT with [facility] called regarding (reason for call): OT Verbal orders    Orders are needed for this patient. OT    PT: ede    OT: 2xwk/3w, for strength & conditioning, bathroom equipment and transfers, ADLs, falls prevention    Skilled Nursing: na    Pt Provider: Altagracia    Phone Number Homecare Nurse can be reached at: [phone]    Can we leave a detailed message on this number? YES    Phone number patient can be reached at: [place] number on file [phone] (home)    Best Time: any    Call taken on 8/8/2019 at 9:35 AM by Vilma Rios

## 2019-08-08 NOTE — TELEPHONE ENCOUNTER
Called Twila with [facility] to provide verbal orders for OT as requested    Yaakov GOETZ RN

## 2019-08-09 ENCOUNTER — TELEPHONE (OUTPATIENT)
Dept: FAMILY MEDICINE | Facility: CLINIC | Age: 84
End: 2019-08-09

## 2019-08-09 DIAGNOSIS — Z79.01 LONG TERM CURRENT USE OF ANTICOAGULANT THERAPY: ICD-10-CM

## 2019-08-09 LAB — INR PPP: 2.5 (ref 0.8–1.1)

## 2019-08-09 NOTE — TELEPHONE ENCOUNTER
Reason for Call:  INR    Who is calling?  Home Care: Jessica/[facility]     Phone number:  [phone]    Fax number:  NA     Name of caller: Jessica     INR Value:  2.5     Are there any other concerns:  No    Can we leave a detailed message on this number? YES    Phone number patient can be reached at: Home number on file [phone] (home)      Call taken on 8/9/2019 at 9:45 AM by Krzysztof Mancera

## 2019-08-09 NOTE — TELEPHONE ENCOUNTER
ANTICOAGULATION MANAGEMENT     Patient Name:  Amira Arreola  Date:  2019  Contact Type:  Telephone/ Jessica, home care nurse    SUBJECTIVE:  Missed doses: No     Medication changes:   No     S/S of bleeding or thromboembolism:  No     New Injury or illness:   No     Changes in diet or alcohol consumption:  No     Upcoming surgery, procedure or cardioversion:  No    Additional findings: none     OBJECTIVE    INR   Date Value Ref Range Status   2019 2.5 (A) 0.8 - 1.1 Final       ASSESSMENT / PLAN      Anticoagulation Summary  As of 2019    INR goal:   2.0-3.0   TTR:   66.8 % (3 y)   INR used for dosin.5 (2019)   Warfarin maintenance plan:   4 mg (4 mg x 1) every Tue, Sat; 2 mg (4 mg x 0.5) all other days   Full warfarin instructions:   4 mg every Tue, Sat; 2 mg all other days   Weekly warfarin total:   18 mg   Plan last modified:   Jessica Charlton, RN (3/13/2019)   Next INR check:   2019   Priority:   INR   Target end date:   Indefinite    Indications    Long term current use of anticoagulant therapy [Z79.01]  Atrial fibrillation (H) [I48.91] (Resolved) [I48.91]             Anticoagulation Episode Summary     INR check location:       Preferred lab:       Send INR reminders to:   DANTE MOYA    Comments:    INR to be drawn at infusion visit OR home care nurse. If scheduled for Homecare, Please notify  Zhane [phone] Patient can be reached at home phone [phone]. Do not leave dosing with spouse      Anticoagulation Care Providers     Provider Role Specialty Phone number    Addy Frias MD [facility] Internal Medicine [phone]            Today's INR result of 2.5 is Therapeutic. Goal INR of 2.0-3.0        Warfarin Dosing Instructions: Continue 4 mg every Tue, Sat; 2 mg all other days      Instructed patient to follow up no later than: 1 week    Education provided: Bhumi Lopez verbalizes understanding and agrees to warfarin dosing plan.    Instructed to call the  Anticoagulation Clinic for any changes, questions or concerns. (#[phone])         ?

## 2019-08-10 LAB
KAPPA LC FREE SER-MCNC: 2.64 MG/DL (ref 0.33–1.94)
KAPPA LC FREE/LAMBDA FREE SER NEPH: ABNORMAL {RATIO} (ref 0.26–1.65)
LAMBDA LC FREE SERPL-MCNC: <0.16 MG/DL (ref 0.57–2.63)

## 2019-08-11 NOTE — RESULT ENCOUNTER NOTE
Inform patient that kappa free light chain is 2.64. It is still very low. Will continue to monitor it.    Shayne Roberts

## 2019-08-12 NOTE — PROGRESS NOTES
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     1. On 09/21/2015, WBC of 4.2, hemoglobin of 13.2 and platelets of 138.    -On 09/29/2015, SPEP does not reveal any M-spike.   -On 10/02/2015, JANET does not reveal any monoclonal protein.     -On 10/22/2015, urine immunofixation reveals monoclonal free kappa light chain.    2. On 05/11/2016, kappa light chain of 50, lambda light chain of 0.32 and ratio of kappa to lambda of 156.2.  3. Bone marrow biopsy on 05/25/2016 reveals 40-50% kappa light chain restricted plasma cells.  Cytogenetics is normal. FISH panel reveals translocation 11;14.    4. MRI of bones on 06/21/2016 and 06/22/2016 reveals myeloma lesions.  5. On 08/24/2016, she was started on revlimid with dexamethasone 20 mg weekly. She did not have any significant response to treatment.   6. Velcade and dexamethasone started on 03/21/2017.    7. Daratumumab added to velcade and dexamethasone on 05/31/2017.   -Velcade given every 14 days starting 08/01/2018.  -Treatment on hold. Last Velcade was on 06/05/2019.  Last daratumumab was on 05/22/2019. Dexamethasone continued.  8. On 05/22/2019, kappa free light chain of 1.21.      SUBJECTIVE:  Ms. Arreola is an 87-year-old female with kappa free light chain multiple myeloma.  The patient was on treatment with Velcade, daratumumab and dexamethasone.  Because of worsening fatigue, we are holding the treatment.  Last Velcade was on 06/05/2019.  Last daratumumab was on 05/22/2019.  She has continued weekly dexamethasone.      Overall, the patient's condition is stable.  She has fatigue.  She is still able to take care of herself.  She can do daily activities of living.  Fatigue has not gotten worse.      No headache.  Some lightheadedness.  She has chronic upper back pain.  The patient takes Percocet which helps.  She wants a refill on that.      The patient has been admitted to the  hospital twice in the last 3 months.  The patient does have multiple medical problems including cardiac disease.  She is seeing a cardiologist.      No abdominal pain, nausea or vomiting.  Appetite is fairly good.  She has pedal edema.  No worsening of it.  No urinary or bowel complaints.  No bleeding.  She is on warfarin.  No bleeding complication.      PHYSICAL EXAMINATION:   Alert and oriented x 3. ECOG PS of 2.  EYES:  No icterus.   THROAT:  No ulcer or thrush.   NECK:  Supple. No lymphadenopathy.   AXILLAE:  No lymphadenopathy.   LUNGS:  Good air entry bilaterally.  No crackles or wheezing.   HEART:  Regular.  No murmur.   ABDOMEN:  Soft and nontender.  No mass.   EXTREMITIES: Bilateral pedal edema, improving. No calf swelling or tenderness.   SKIN: No petechia.     LABORATORY DATA:  Reviewed.      ASSESSMENT:   1.  An 87-year-old female with kappa free light chain multiple myeloma.  Myeloma is stable.  She is not on any treatment except weekly dexamethasone.   2.  Chronic back pain, stable.   3.  Fatigue, stable.   4.  Multiple other medical problems including cardiac disease.      PLAN:   1.  I had a long discussion with the patient.  Her family members were present.  Her daughter who is an ophthalmologist was on the phone.      I discussed with the patient regarding myeloma.  The patient clinically is stable.  Labs from today reveal normal WBC, hemoglobin and platelets.  Her calcium and creatinine are normal.  When the patient was in the clinic, we did not have the result of kappa free light chain.      We discussed regarding treatment for myeloma.  At this time, I would recommend observation.  The patient recently has been hospitalized twice to the hospital.  Starting any treatment for myeloma will cause side effects and deterioration in her condition.      I told the patient that at this time I would like to monitor.  When there is worsening of myeloma, we will introduce other treatment.  She will continue  on weekly dexamethasone.  In future, we can add Velcade or daratumumab or both.  The patient is agreeable for this plan.      I told the patient that she has kappa free light chain myeloma.  [place] free light chain used to be around 60.  Lately it has been remaining less than 3.  My plan will be to resume treatment when kappa free light chain is more than 10.     2.  Family had multiple questions, which were all answered.  They are agreeable with the plan of monitoring the patient and starting treatment when there is significant progression.     3.  The patient will continue on oxycodone for pain.  Side effects including sedation and addiction were discussed.     4.  I will see her in 2 months' time.  Family advised to bring the patient back sooner if there is worsening weakness, worsening pain, weight loss, recurrent infection or any other concerns.     ADDENDUM: Kappa free light chain on 2019 is 2.64.     Total face-to-face time spent 30 minutes, more than 50% of the time spent in counseling and coordination of care.         ITZ LOPEZ MD             D: 2019   T: 2019   MT: SYLVESTER      Name:     ALBERTO PARSON   MRN:      [id number]        Account:      [id number]   :      1932           Visit Date:   2019      Document: [id number]

## 2019-08-16 ENCOUNTER — TELEPHONE (OUTPATIENT)
Dept: FAMILY MEDICINE | Facility: CLINIC | Age: 84
End: 2019-08-16

## 2019-08-16 DIAGNOSIS — Z79.01 LONG TERM CURRENT USE OF ANTICOAGULANT THERAPY: ICD-10-CM

## 2019-08-16 LAB — INR PPP: 2.1 (ref 0.8–1.1)

## 2019-08-16 NOTE — TELEPHONE ENCOUNTER
ANTICOAGULATION MANAGEMENT     Patient Name:  Amira Arreola  Date:  2019    ASSESSMENT /SUBJECTIVE:      Today's INR result of 2.1 is Therapeutic. Goal INR of 2.0-3.0      Warfarin taken as previously instructed    No new diet changes affecting INR    No new medications/supplements affecting INR    Previous INR Therapeutic    S/S of bleeding or thromboembolism    New injury or illness:  No    Upcoming surgery, procedure or cardioversion:  No    Additional findings: none      Plan    Spoke with Maximilian home care nurse regarding INR result and instructed       Warfarin Dosing Instructions:Continue your current warfarin dose of 4 mg every Tues, Sat; 2 mg all other days    Instructed patient to follow up no later than: 1 week    Education provided: No      Maximilian verbalizes understanding and agrees to warfarin dosing plan.    Instructed to call the Anticoagulation Clinic for any changes, questions or concerns. (#[phone])        OBJECTIVE    INR   Date Value Ref Range Status   2019 2.1 (A) 0.8 - 1.1 Final             Anticoagulation Summary  As of 2019    INR goal:   2.0-3.0   TTR:   67.0 % (3 y)   INR used for dosin.1 (2019)   Warfarin maintenance plan:   4 mg (4 mg x 1) every Tue, Sat; 2 mg (4 mg x 0.5) all other days   Full warfarin instructions:   4 mg every Tue, Sat; 2 mg all other days   Weekly warfarin total:   18 mg   Plan last modified:   Jessica Charlton, RN (3/13/2019)   Next INR check:   2019   Priority:   INR   Target end date:   Indefinite    Indications    Long term current use of anticoagulant therapy [Z79.01]  Atrial fibrillation (H) [I48.91] (Resolved) [I48.91]             Anticoagulation Episode Summary     INR check location:       Preferred lab:       Send INR reminders to:   DANTE MOYA    Comments:    INR to be drawn at infusion visit OR home care nurse. If scheduled for Homecare, Please notify  Zhane [phone] Patient can be reached at home phone  [phone]. Do not leave dosing with spouse      Anticoagulation Care Providers     Provider Role Specialty Phone number    Addy Frias MD [facility] Internal Medicine [phone]

## 2019-08-23 ENCOUNTER — TELEPHONE (OUTPATIENT)
Dept: FAMILY MEDICINE | Facility: CLINIC | Age: 84
End: 2019-08-23

## 2019-08-23 DIAGNOSIS — Z79.01 LONG TERM CURRENT USE OF ANTICOAGULANT THERAPY: ICD-10-CM

## 2019-08-23 LAB — INR PPP: 1.8 (ref 0.8–1.1)

## 2019-08-23 NOTE — TELEPHONE ENCOUNTER
ANTICOAGULATION MANAGEMENT     Patient Name:  Amira Arreola  Date:  2019    ASSESSMENT /SUBJECTIVE:      Today's INR result of 1.8 is Subtherapeutic. Goal INR of 2.0-3.0      Warfarin taken differently than instructed, but no impact to total weekly dose    Increased greens/vitamin K intake may be affecting INR - ate a big salad yesterday, not her normal, will go back to her usual intake    No new medications/supplements affecting INR    Previous INR Therapeutic     S/S of bleeding or thromboembolism No    New injury or illness:  No    Upcoming surgery, procedure or cardioversion:  No    Additional findings: none      Plan    Spoke with Rajiv home care nurse, regarding INR result and instructed       Warfarin Dosing Instructions:Take 4 mg today only then continue your current warfarin dose of 4 mg Tue, Sat; 2 mg all other days    Instructed patient to follow up no later than: 1 week    Education provided: Yes: importance of consistent vitamin K intake      Rajiv verbalizes understanding and agrees to warfarin dosing plan.    Instructed to call the Anticoagulation Clinic for any changes, questions or concerns. (#[phone])        OBJECTIVE    INR   Date Value Ref Range Status   2019 1.8 (A) 0.8 - 1.1 Final             Anticoagulation Summary  As of 2019    INR goal:   2.0-3.0   TTR:   66.8 % (3.1 y)   INR used for dosin.8! (2019)   Warfarin maintenance plan:   4 mg (4 mg x 1) every Tue, Sat; 2 mg (4 mg x 0.5) all other days   Full warfarin instructions:   : 4 mg; Otherwise 4 mg every Tue, Sat; 2 mg all other days   Weekly warfarin total:   18 mg   Plan last modified:   Jessica Charlton, RN (3/13/2019)   Next INR check:   2019   Priority:   INR   Target end date:   Indefinite    Indications    Long term current use of anticoagulant therapy [Z79.01]  Atrial fibrillation (H) [I48.91] (Resolved) [I48.91]             Anticoagulation Episode Summary     INR check location:        Preferred lab:       Send INR reminders to:   DANTE MOYA    Comments:    INR to be drawn at infusion visit OR home care nurse. If scheduled for Homecare, Please notify  Zhane [phone] Patient can be reached at home phone [phone]. Do not leave dosing with spouse      Anticoagulation Care Providers     Provider Role Specialty Phone number    Addy Frias MD [facility] Internal Medicine [phone]

## 2019-08-29 ENCOUNTER — OFFICE VISIT (OUTPATIENT)
Dept: CARDIOLOGY | Facility: CLINIC | Age: 84
End: 2019-08-29
Attending: NURSE PRACTITIONER
Payer: MEDICARE

## 2019-08-29 ENCOUNTER — ANCILLARY PROCEDURE (OUTPATIENT)
Dept: CARDIOLOGY | Facility: CLINIC | Age: 84
End: 2019-08-29
Attending: INTERNAL MEDICINE
Payer: MEDICARE

## 2019-08-29 VITALS
SYSTOLIC BLOOD PRESSURE: 118 MMHG | HEIGHT: 65 IN | BODY MASS INDEX: 22.49 KG/M2 | HEART RATE: 70 BPM | DIASTOLIC BLOOD PRESSURE: 68 MMHG | OXYGEN SATURATION: 99 % | WEIGHT: 135 LBS

## 2019-08-29 DIAGNOSIS — I48.91 ATRIAL FIBRILLATION WITH RAPID VENTRICULAR RESPONSE (H): ICD-10-CM

## 2019-08-29 DIAGNOSIS — Z95.0 CARDIAC PACEMAKER IN SITU: Primary | ICD-10-CM

## 2019-08-29 DIAGNOSIS — Z95.0 PACEMAKER: ICD-10-CM

## 2019-08-29 DIAGNOSIS — I48.20 CHRONIC ATRIAL FIBRILLATION (H): ICD-10-CM

## 2019-08-29 DIAGNOSIS — I34.0 MITRAL VALVE INSUFFICIENCY, UNSPECIFIED ETIOLOGY: ICD-10-CM

## 2019-08-29 DIAGNOSIS — I50.32 CHRONIC DIASTOLIC HEART FAILURE (H): Primary | ICD-10-CM

## 2019-08-29 DIAGNOSIS — I10 BENIGN ESSENTIAL HYPERTENSION: ICD-10-CM

## 2019-08-29 DIAGNOSIS — I07.1 TRICUSPID VALVE INSUFFICIENCY, UNSPECIFIED ETIOLOGY: ICD-10-CM

## 2019-08-29 PROCEDURE — 93279 PRGRMG DEV EVAL PM/LDLS PM: CPT | Performed by: INTERNAL MEDICINE

## 2019-08-29 PROCEDURE — 99214 OFFICE O/P EST MOD 30 MIN: CPT | Mod: 24 | Performed by: NURSE PRACTITIONER

## 2019-08-29 RX ORDER — FUROSEMIDE 20 MG
40 TABLET ORAL DAILY
Qty: 180 TABLET | Refills: 1 | Status: SHIPPED | OUTPATIENT
Start: 2019-08-29 | End: 2019-09-30

## 2019-08-29 ASSESSMENT — MIFFLIN-ST. JEOR: SCORE: 1048.24

## 2019-08-29 NOTE — PATIENT INSTRUCTIONS
1. INCREASE furosemide to 40mg daily (2 tablets).   2. Monitor weight daily at home, record  3. CORE nurse will call in 1 week to check on weight/swell.  4. Please call with any questions/concerns [phone]  5. Follow up with Elisabeth in 1 month with labs

## 2019-08-29 NOTE — PROGRESS NOTES
Cardiology Clinic Progress Note  Amira Arreola MRN# [id number]   YOB: 1932 Age: 87 year old   Primary Cardiologist: Dr. Rivera Reason for visit: CORE follow up            Assessment and Plan:   Amira Arreola is a very pleasant 86 year old female with a history of HFpEF, chronic atrial fibrillation s/p AV solomon ablation with PPM implantation 7/5/19, hypertension, mitral regurgitation, tricuspid regurgitation and hx of multiple myeloma mets to bone (dx 2016, currently being treated with Daratumab, Velcade, and Dexamethasone every 2 weeks). Patient with 2 recent hospitalizations, 7/3/19-7/7/19 and  6/28/19-7/1/19, see below for details. Patient here today for CORE follow up.    1.  Chronic diastolic heart failure/HFpEF - Echocardiogram completed 3/23/19 LVEF 55-60%, no wall motion abnormalities, moderate mitral regurgitation, moderate tricuspid regurgitation, and severe pulmonary hypertension. Weight today 135#, which has been slowly increasing since transitioning home from [place], with increasing lower extremity edema. Patient appears compensated and volume up on exam with +2 lower extremity edema and JVD elevated at 10cm. Overall HF symptoms have been significantly improved with control of her atrial fibrillation with RVR which was exacerbating HF. Labs from earlier this month show stable kidney function and electrolytes. Will increase diuretic regimen today.    - NYHA class III, stage C   - Fluid status : euvolemic   - Dry weight : ~ 130#   - Diuretic regimen : INCREASE furosemide to 40mg daily   - Aldosterone antagonist : none   - Blood pressure : controlled   - Reinforced HF education, reviewed low sodium diet and reading labels today.    - CORE RN to call patient next week to check on weight/leg edema.     2. Chronic atrial fibrillation - s/p AV solomon ablation with PPM implantation 7/5/19, stable, significant improvement in symptoms since ablation and heart rate control achieved.    -  [id number] score 5 (HTN, HF, age, female)   - Continue warfarin for thromboembolic prophylaxis.     3. Moderate mitral regurgitation, moderate tricuspid regurgitation   - Will consider repeat echocardiogram in the future when patient is euvolemic or if worsening symptoms, currently won't change any management clinically.      4. Severe pulmonary hypertension - likely secondary to HFpEF and likely improved with diuresis.    - Will consider repeat echocardiogram in the future     5. Multiple myeloma with mets to bone - follows with Dr. Roberts   - Chemo therapy currently on hold, patient had follow up with Dr. Roberts in August at which time continued observation was recommended given her clinically stable state.    7. Advance care planning - patient and family met with palliative care while hospitalized, code status changed to DNR/DNI, patient still wishing to seek restorative care. Patient and family have a reasonable understanding of her current health status. Patient has very supportive/involved family.    - Health care directive completed, electing her daughter Eufemia as POA.    - Reviewed that palliative care is available in the clinic for further support in the future. Patient and daughter note they will reach out if interested in meeting with Dr. Farris.     Changes today: INCREASE furosemide to 40mg daily     Follow up plan:     Follow up with CORE in 1 month with labs    CORE RN to call patient in 1 week to check on weight/lower extremity edema        History of Presenting Illness:    Amira Arreola is a very pleasant 86 year old female with a history of HFpEF, chronic atrial fibrillation, hypertension, mitral regurgitation, tricuspid regurgitation and hx of multiple myeloma mets to bone (dx 2016, currently being treated with Daratumab, Velcade, and Dexamethasone every 2 weeks).     Patient with 2 recent hospitalizations 7/3/19-7/7/19 presenting from the cardiology office with hypotension and altered mental  status felt to be related to hypovolemia and hypercalcemia. Patient was in atrial fibrillation with RVR. Serial troponin negative. Unable to tolerate rate control medications due to hypotension s/p AV solomon ablation with PPM 7/5/19. Furosemide on hold during most of hospital stay due to concerns of hypovolemia, resumed at lower dosage 20mg at discharge. S/p thoracentesis 7/5 250cc removed, transudative. Palliative care met with patient/family during hospital stay, code status changed to DNR/DNI but family still wishing to receive restorative care. Discharge weight 131#. Patient discharged to [place]. 6/28/19-7/1/19 related to atrial fibrillation with RVR, acute on chronic diastolic heart failure and bilateral pleural effusions. BNP 13K on admission, 2K at time of discharge. Diltiazem increased to 180mg/day. Prior to this patient was hospitalized in March, see below for details.     Echocardiogram completed 3/23/19 LVEF 55-60%, no wall motion abnormalities, moderate mitral regurgitation, moderate tricuspid regurgitation, and severe pulmonary hypertension.     Patient was last seen in CORE clinic by me on 7/17/19 for CORE follow up and post hospital follow up. Patient weight and symptoms were well controlled, no medication changes were made.  Patients atrial fibrillation with RVR was likely exacerbating HF, significant improvement in symptoms with heart rate control s/p AV solomon ablation. Since last CORE visit patient discharged home from [place] on 7/22/2019.     Patient is here today for CORE followup. Daughter present for clinic visit. Patient very happy to be back at home. Patient and daughter note that things are going well at home. Monitoring weights daily at home. Has been ranging 135 -140#, states her weight went up to 140#. Notes her weight has slowly increased since being home. Patient has noted some worsening lower extremity edema. Denies abdominal distention/bloating. Denies shortness of breath at rest.  Occasional exertional dyspnea with walking, resolves quickly with breaks. Sleeping good. Denies orthopnea or PND. Patient denies chest pain or chest tightness. Denies dizziness, lightheadedness or other presyncopal symptoms. Denies tachycardia or palpitations. Denies falls. Denies bleeding episodes. Some complaints of chronic back pain, no changes or worsening.     Labs from 8/7/19 show stable kidney function and electrolytes. Blood pressure 118/68 and HR 70.    Appetite is good. Son helps with dinner, patient notes that many items are things that are just warmed up in the oven. Not adding salt to foods. Patient reports drinking < 2L daily. No tobacco or alcohol use. Using a walker for assistance at home. She had homemaker services 3 x a week for 4 hours to help with cleaning and bathing. Daughter setting up pill boxes.          Recent Hospitalizations   7/3/19-7/7/19 presenting from the cardiology office with hypotension and altered mental status felt to be related to hypovolemia and hypercalcemia. Patient was in atrial fibrillation with RVR. Serial troponin negative. Unable to tolerate rate control medications due to hypotension s/p AV solomon ablation with PPM 7/5/19. Furosemide on hold during most of hospital stay due to concerns of hypovolemia, resumed at lower dosage 20mg at discharge. S/p thoracentesis 7/5 250cc removed, transudative. Palliative care met with patient/family during hospital stay, still wishing to receive restorative care.   6/28/19-7/1/19 related to atrial fibrillation with RVR, acute on chronic diastolic heart failure and bilateral pleural effusions. BNP 13K on admission, 2K at time of discharge. Diltiazem increased to 180mg/day.  3/22/19-3/28/19 presented with dyspnea, leg swelling and intermittent palpitations. Patient was found to have atrial fibrillation with RVR and acute diastolic heart failure exacerbation. Weight 3/24/19 147# (doesn't appear admission weight was completed). Discharge  weight 135#.        Social History    , 6 children, Enjoys keeping the house clean and orderly. Retired nurse.   Social History     Socioeconomic History     Marital status:      Spouse name: Tom     Number of children: 6     Years of education: Not on file     Highest education level: Not on file   Occupational History     Occupation: rn anesthetist     Employer: RETIRED   Social Needs     Financial resource strain: Not on file     Food insecurity:     Worry: Not on file     Inability: Not on file     Transportation needs:     Medical: Not on file     Non-medical: Not on file   Tobacco Use     Smoking status: Never Smoker     Smokeless tobacco: Never Used   Substance and Sexual Activity     Alcohol use: No     Alcohol/week: 0.0 oz     Drug use: No     Sexual activity: Never   Lifestyle     Physical activity:     Days per week: Not on file     Minutes per session: Not on file     Stress: Not on file   Relationships     Social connections:     Talks on phone: Not on file     Gets together: Not on file     Attends Mosque service: Not on file     Active member of club or organization: Not on file     Attends meetings of clubs or organizations: Not on file     Relationship status: Not on file     Intimate partner violence:     Fear of current or ex partner: Not on file     Emotionally abused: Not on file     Physically abused: Not on file     Forced sexual activity: Not on file   Other Topics Concern     Parent/sibling w/ CABG, MI or angioplasty before 65F 55M? Not Asked   Social History Narrative     Not on file            Review of Systems:   Skin:  Positive for bruising   Eyes:  Positive for glaucoma  ENT:  Negative    Respiratory:  Negative    Cardiovascular:    fatigue;Positive for;edema  Gastroenterology: Negative    Genitourinary:  Negative    Musculoskeletal:  Positive for arthritis  Neurologic:  Positive for local weakness;incoordination  Psychiatric:  Negative    Heme/Lymph/Imm:  Positive  "for allergies  Endocrine:  Negative           Physical Exam:   Vitals: /68   Pulse 70   Ht 1.651 m (5' 5\")   Wt 61.2 kg (135 lb)   SpO2 99%   BMI 22.47 kg/m     Wt Readings from Last 4 Encounters:   08/29/19 61.2 kg (135 lb)   07/30/19 59.9 kg (132 lb)   07/23/19 59.1 kg (130 lb 4.8 oz)   07/22/19 59.5 kg (131 lb 3.2 oz)     GEN: frail, elderly  HEENT:  Pupils equal, round. Sclerae nonicteric.   NECK: Supple, no masses appreciated. JVP elevated at 10cm  C/V:  Irregularly irregular rate and rhythm, no murmur, rub or gallop.   RESP: Respirations are unlabored. Clear bilaterally. Diminished in bases.   GI: Abdomen distended, nontender.  EXTREM: +2 bilateral lower extremity edema  NEURO: Alert and cooperative.  SKIN: Warm and dry.        Data:   ECHO 3/23/19  The left ventricle is normal in size.  The visual ejection fraction is estimated at 55-60%.  No regional wall motion abnormalities noted.  There is moderate (2+) mitral regurgitation.  There is moderate (2+) tricuspid regurgitation.  Severe (>55mmHg) pulmonary hypertension is present.  The rhythm was rapid atrial fibrillation.    LIPID RESULTS:  Lab Results   Component Value Date    CHOL 160 10/12/2016    HDL 76 10/12/2016    LDL 71 10/12/2016    TRIG 66 10/12/2016    CHOLHDLRATIO 2.3 09/21/2015     LIVER ENZYME RESULTS:  Lab Results   Component Value Date    AST 20 08/07/2019    ALT 15 08/07/2019     CBC RESULTS:  Lab Results   Component Value Date    WBC 10.4 08/07/2019    RBC 3.80 08/07/2019    HGB 11.9 08/07/2019    HCT 36.8 08/07/2019    MCV 97 08/07/2019    MCH 31.3 08/07/2019    MCHC 32.3 08/07/2019    RDW 14.7 08/07/2019     08/07/2019     BMP RESULTS:  Lab Results   Component Value Date     08/07/2019    POTASSIUM 3.8 08/07/2019    CHLORIDE 110 (H) 08/07/2019    CO2 24 08/07/2019    ANIONGAP 7 08/07/2019     (H) 08/07/2019    BUN 15 08/07/2019    CR 0.72 08/07/2019    GFRESTIMATED 75 08/07/2019    GFRESTBLACK 87 08/07/2019    " BRADLEY 9.0 08/07/2019      INR RESULTS:  Lab Results   Component Value Date    INR 1.8 (A) 08/23/2019    INR 2.1 (A) 08/16/2019            Medications     Current Outpatient Medications   Medication Sig Dispense Refill     acetaminophen (TYLENOL) 500 MG tablet Take 500 mg by mouth every 6 hours as needed for mild pain        acyclovir (ZOVIRAX) 400 MG tablet Take 1 tablet (400 mg) by mouth 2 times daily 60 tablet 3     Carboxymethylcellulose Sod PF (REFRESH PLUS) 0.5 % SOLN ophthalmic solution 1 drop 4 times daily as needed for dry eyes       dexamethasone (DECADRON) 4 MG tablet Take 20mg (5 tablets) by mouth every week. 28 tablet 3     furosemide (LASIX) 20 MG tablet Take 2 tablets (40 mg) by mouth daily 180 tablet 1     latanoprost (XALATAN) 0.005 % ophthalmic solution Place 1 drop into the right eye At Bedtime       lidocaine-prilocaine (EMLA) cream Apply to port site 1 hour prior to access 30 g 1     Multiple Vitamins-Minerals (DAILY MULTIVITAMIN) CAPS Take 1 tablet by mouth daily        oxyCODONE-acetaminophen (PERCOCET) 5-325 MG tablet Take 1 tablet by mouth every 4 hours as needed for breakthrough pain 30 tablet 0     polyethylene glycol (MIRALAX/GLYCOLAX) powder Take 17 g by mouth daily as needed for constipation        potassium chloride (KLOR-CON) 20 MEQ packet Take 20 mEq by mouth daily 60 packet 1     prochlorperazine (COMPAZINE) 10 MG tablet Take 1 tablet (10 mg) by mouth every 6 hours as needed for nausea or vomiting 30 tablet 1     SIMBRINZA 1-0.2 % ophthalmic suspension Place 1 drop into the right eye 2 times daily 1 drop AM and PM  2     Sodium Fluoride (SF 5000 PLUS) 1.1 % CREA Apply to affected area 3 times daily       triamcinolone (KENALOG) 0.1 % external cream Apply topically 2 times daily 15 g 1     warfarin (COUMADIN) 4 MG tablet Take 0.5-1 tablets (2-4 mg) by mouth daily As directed by INR clinic 90 tablet 0     warfarin (COUMADIN) 4 MG tablet Take one tablet (4 mg) by mouth on Tuesdays,    and ; take one-half tablet ( 2 mg) on all other days of the week.            Past Medical History     Past Medical History:   Diagnosis Date     Abnormal CXR 2018    then ct done and not significant     Acute diastolic heart failure (H) 2019    nl ef, 2+mr and tr with severe pulm htn     Ascending aorta dilatation (H) 2016    on echo, mild, fu  4.0, slightly larger     Cancer, metastatic to bone (H)     due to myeloma     Colonic polyp     adenomatous, fu  tics only     Compression fracture     multiple areas of spine     Dry eyes      Elevated MCV     b12 and folic acid nl     HTN (hypertension)     off meds for years     Lung nodule 2018    on ct, 4mm, ct done for fu abnl cxr     Menorrhagia     hysteroscopy and d and c done     MGUS (monoclonal gammopathy of unknown significance)     eval by Dr. Roberts     Multiple myeloma (H)     dx  at [place], bone lesions seen on mri      OAB (overactive bladder)     Dr. Grullon     Osteoporosis     fu done  and stable, went off meds then, fu done ; has had gyn fu and added evista 2013 by gyn     Palpitations     nl echo, mildly dilated asc aorta     Paroxysmal atrial fibrillation (H)     had palp and ziopatch showed it, echo nl lv fxn, mild mr and tr, added coum and toprol, toprol dose raised 16     Pulmonary hypertension (H) 2019    seen on echo     Sciatica of left side     Dr. Helen Ricardo      SVT (supraventricular tachycardia) (H)     on ziopatch     Thrombocytopenia (H)      Past Surgical History:   Procedure Laterality Date     BONE MARROW BIOPSY, BONE SPECIMEN, NEEDLE/TROCAR N/A 2016    Procedure: BIOPSY BONE MARROW;  Surgeon: Bryan Patel MD;  Location:  GI     CATARACT IOL, RT/LT        SECTION  ,      COLONOSCOPY  2013    Procedure: COLONOSCOPY;  COLONOSCOPY;  Surgeon: Steffany Rockwell,  MD;  Location:  GI     EP ABLATION AV NODE N/A 7/5/2019    Procedure: EP Ablation AV Node;  Surgeon: Lee Menchaca MD;  Location:  HE[facility]ATH LAB     EP PACEMAKER N/A 7/5/2019    Procedure: EP Pacemaker;  Surgeon: Lee Menchaca MD;  Location:  HEART [facility]ARD[facility] CATH LAB     EXCISE EXOSTOSIS TIBIA / FIBULA  6/30/2014    Procedure: EXCISE EXOSTOSIS TIBIA / FIBULA;  Surgeon: Naila Pichardo MD;  Location:  SD     hysteroscopy and d and c  2002    due to bleeding     left anle replacement  2007     right ankle surgery  2003     Family History   Problem Relation Age of Onset     Heart Disease Father      C.A.D. Mother      Cerebrovascular Disease Brother      Family History Negative Sister      Family History Negative Sister      Family History Negative Brother             Allergies   Blood transfusion related (informational only) and Penicillin [penicillins]        [facility]  Pager: [phone]

## 2019-08-29 NOTE — LETTER
8/29/2019    Addy Frias MD  [address]  [place] [address]    RE: Amira Arreola       Dear Colleague,    I had the pleasure of seeing Amira Arreola in the [facility].    Cardiology Clinic Progress Note  Amira Arreola MRN# [id number]   YOB: 1932 Age: 87 year old   Primary Cardiologist: Dr. Rivrea Reason for visit: CORE follow up            Assessment and Plan:   Amira Arreola is a very pleasant 86 year old female with a history of HFpEF, chronic atrial fibrillation s/p AV solomon ablation with PPM implantation 7/5/19, hypertension, mitral regurgitation, tricuspid regurgitation and hx of multiple myeloma mets to bone (dx 2016, currently being treated with Daratumab, Velcade, and Dexamethasone every 2 weeks). Patient with 2 recent hospitalizations, 7/3/19-7/7/19 and  6/28/19-7/1/19, see below for details. Patient here today for CORE follow up.    1.  Chronic diastolic heart failure/HFpEF - Echocardiogram completed 3/23/19 LVEF 55-60%, no wall motion abnormalities, moderate mitral regurgitation, moderate tricuspid regurgitation, and severe pulmonary hypertension. Weight today 135#, which has been slowly increasing since transitioning home from [place], with increasing lower extremity edema. Patient appears compensated and volume up on exam with +2 lower extremity edema and JVD elevated at 10cm. Overall HF symptoms have been significantly improved with control of her atrial fibrillation with RVR which was exacerbating HF. Labs from earlier this month show stable kidney function and electrolytes. Will increase diuretic regimen today.    - NYHA class III, stage C   - Fluid status : euvolemic   - Dry weight : ~ 130#   - Diuretic regimen : INCREASE furosemide to 40mg daily   - Aldosterone antagonist : none   - Blood pressure : controlled   - Reinforced HF education, reviewed low sodium diet and reading labels today.    - CORE RN to call patient next week to  check on weight/leg edema.     2. Chronic atrial fibrillation - s/p AV solomon ablation with PPM implantation 7/5/19, stable, significant improvement in symptoms since ablation and heart rate control achieved.    - [id number] score 5 (HTN, HF, age, female)   - Continue warfarin for thromboembolic prophylaxis.     3. Moderate mitral regurgitation, moderate tricuspid regurgitation   - Will consider repeat echocardiogram in the future when patient is euvolemic or if worsening symptoms, currently won't change any management clinically.      4. Severe pulmonary hypertension - likely secondary to HFpEF and likely improved with diuresis.    - Will consider repeat echocardiogram in the future     5. Multiple myeloma with mets to bone - follows with Dr. Roberts   - Chemo therapy currently on hold, patient had follow up with Dr. Roberts in August at which time continued observation was recommended given her clinically stable state.    7. Advance care planning - patient and family met with palliative care while hospitalized, code status changed to DNR/DNI, patient still wishing to seek restorative care. Patient and family have a reasonable understanding of her current health status. Patient has very supportive/involved family.    - Health care directive completed, electing her daughter Eufemia as POA.    - Reviewed that palliative care is available in the clinic for further support in the future. Patient and daughter note they will reach out if interested in meeting with Dr. Farris.     Changes today: INCREASE furosemide to 40mg daily     Follow up plan:     Follow up with CORE in 1 month with labs    CORE RN to call patient in 1 week to check on weight/lower extremity edema        History of Presenting Illness:    Amira Arreola is a very pleasant 86 year old female with a history of HFpEF, chronic atrial fibrillation, hypertension, mitral regurgitation, tricuspid regurgitation and hx of multiple myeloma mets to bone (dx 2016,  currently being treated with Daratumab, Velcade, and Dexamethasone every 2 weeks).     Patient with 2 recent hospitalizations 7/3/19-7/7/19 presenting from the cardiology office with hypotension and altered mental status felt to be related to hypovolemia and hypercalcemia. Patient was in atrial fibrillation with RVR. Serial troponin negative. Unable to tolerate rate control medications due to hypotension s/p AV solomon ablation with PPM 7/5/19. Furosemide on hold during most of hospital stay due to concerns of hypovolemia, resumed at lower dosage 20mg at discharge. S/p thoracentesis 7/5 250cc removed, transudative. Palliative care met with patient/family during hospital stay, code status changed to DNR/DNI but family still wishing to receive restorative care. Discharge weight 131#. Patient discharged to [place]. 6/28/19-7/1/19 related to atrial fibrillation with RVR, acute on chronic diastolic heart failure and bilateral pleural effusions. BNP 13K on admission, 2K at time of discharge. Diltiazem increased to 180mg/day. Prior to this patient was hospitalized in March, see below for details.     Echocardiogram completed 3/23/19 LVEF 55-60%, no wall motion abnormalities, moderate mitral regurgitation, moderate tricuspid regurgitation, and severe pulmonary hypertension.     Patient was last seen in CORE clinic by me on 7/17/19 for CORE follow up and post hospital follow up. Patient weight and symptoms were well controlled, no medication changes were made.  Patients atrial fibrillation with RVR was likely exacerbating HF, significant improvement in symptoms with heart rate control s/p AV solomon ablation. Since last CORE visit patient discharged home from [place] on 7/22/2019.     Patient is here today for CORE followup. Daughter present for clinic visit. Patient very happy to be back at home. Patient and daughter note that things are going well at home. Monitoring weights daily at home. Has been ranging 135 -140#, states her  weight went up to 140#. Notes her weight has slowly increased since being home. Patient has noted some worsening lower extremity edema. Denies abdominal distention/bloating. Denies shortness of breath at rest. Occasional exertional dyspnea with walking, resolves quickly with breaks. Sleeping good. Denies orthopnea or PND. Patient denies chest pain or chest tightness. Denies dizziness, lightheadedness or other presyncopal symptoms. Denies tachycardia or palpitations. Denies falls. Denies bleeding episodes. Some complaints of chronic back pain, no changes or worsening.     Labs from 8/7/19 show stable kidney function and electrolytes. Blood pressure 118/68 and HR 70.    Appetite is good. Son helps with dinner, patient notes that many items are things that are just warmed up in the oven. Not adding salt to foods. Patient reports drinking < 2L daily. No tobacco or alcohol use. Using a walker for assistance at home. She had homemaker services 3 x a week for 4 hours to help with cleaning and bathing. Daughter setting up pill boxes.          Recent Hospitalizations   7/3/19-7/7/19 presenting from the cardiology office with hypotension and altered mental status felt to be related to hypovolemia and hypercalcemia. Patient was in atrial fibrillation with RVR. Serial troponin negative. Unable to tolerate rate control medications due to hypotension s/p AV solomon ablation with PPM 7/5/19. Furosemide on hold during most of hospital stay due to concerns of hypovolemia, resumed at lower dosage 20mg at discharge. S/p thoracentesis 7/5 250cc removed, transudative. Palliative care met with patient/family during hospital stay, still wishing to receive restorative care.   6/28/19-7/1/19 related to atrial fibrillation with RVR, acute on chronic diastolic heart failure and bilateral pleural effusions. BNP 13K on admission, 2K at time of discharge. Diltiazem increased to 180mg/day.  3/22/19-3/28/19 presented with dyspnea, leg swelling and  intermittent palpitations. Patient was found to have atrial fibrillation with RVR and acute diastolic heart failure exacerbation. Weight 3/24/19 147# (doesn't appear admission weight was completed). Discharge weight 135#.        Social History    , 6 children, Enjoys keeping the house clean and orderly. Retired nurse.   Social History     Socioeconomic History     Marital status:      Spouse name: Tom     Number of children: 6     Years of education: Not on file     Highest education level: Not on file   Occupational History     Occupation: rn anesthetist     Employer: RETIRED   Social Needs     Financial resource strain: Not on file     Food insecurity:     Worry: Not on file     Inability: Not on file     Transportation needs:     Medical: Not on file     Non-medical: Not on file   Tobacco Use     Smoking status: Never Smoker     Smokeless tobacco: Never Used   Substance and Sexual Activity     Alcohol use: No     Alcohol/week: 0.0 oz     Drug use: No     Sexual activity: Never   Lifestyle     Physical activity:     Days per week: Not on file     Minutes per session: Not on file     Stress: Not on file   Relationships     Social connections:     Talks on phone: Not on file     Gets together: Not on file     Attends Yazdanism service: Not on file     Active member of club or organization: Not on file     Attends meetings of clubs or organizations: Not on file     Relationship status: Not on file     Intimate partner violence:     Fear of current or ex partner: Not on file     Emotionally abused: Not on file     Physically abused: Not on file     Forced sexual activity: Not on file   Other Topics Concern     Parent/sibling w/ CABG, MI or angioplasty before 65F 55M? Not Asked   Social History Narrative     Not on file            Review of Systems:   Skin:  Positive for bruising   Eyes:  Positive for glaucoma  ENT:  Negative    Respiratory:  Negative    Cardiovascular:    fatigue;Positive  "for;edema  Gastroenterology: Negative    Genitourinary:  Negative    Musculoskeletal:  Positive for arthritis  Neurologic:  Positive for local weakness;incoordination  Psychiatric:  Negative    Heme/Lymph/Imm:  Positive for allergies  Endocrine:  Negative           Physical Exam:   Vitals: /68   Pulse 70   Ht 1.651 m (5' 5\")   Wt 61.2 kg (135 lb)   SpO2 99%   BMI 22.47 kg/m      Wt Readings from Last 4 Encounters:   08/29/19 61.2 kg (135 lb)   07/30/19 59.9 kg (132 lb)   07/23/19 59.1 kg (130 lb 4.8 oz)   07/22/19 59.5 kg (131 lb 3.2 oz)     GEN: frail, elderly  HEENT:  Pupils equal, round. Sclerae nonicteric.   NECK: Supple, no masses appreciated. JVP elevated at 10cm  C/V:  Irregularly irregular rate and rhythm, no murmur, rub or gallop.   RESP: Respirations are unlabored. Clear bilaterally. Diminished in bases.   GI: Abdomen distended, nontender.  EXTREM: +2 bilateral lower extremity edema  NEURO: Alert and cooperative.  SKIN: Warm and dry.        Data:   ECHO 3/23/19  The left ventricle is normal in size.  The visual ejection fraction is estimated at 55-60%.  No regional wall motion abnormalities noted.  There is moderate (2+) mitral regurgitation.  There is moderate (2+) tricuspid regurgitation.  Severe (>55mmHg) pulmonary hypertension is present.  The rhythm was rapid atrial fibrillation.    LIPID RESULTS:  Lab Results   Component Value Date    CHOL 160 10/12/2016    HDL 76 10/12/2016    LDL 71 10/12/2016    TRIG 66 10/12/2016    CHOLHDLRATIO 2.3 09/21/2015     LIVER ENZYME RESULTS:  Lab Results   Component Value Date    AST 20 08/07/2019    ALT 15 08/07/2019     CBC RESULTS:  Lab Results   Component Value Date    WBC 10.4 08/07/2019    RBC 3.80 08/07/2019    HGB 11.9 08/07/2019    HCT 36.8 08/07/2019    MCV 97 08/07/2019    MCH 31.3 08/07/2019    MCHC 32.3 08/07/2019    RDW 14.7 08/07/2019     08/07/2019     BMP RESULTS:  Lab Results   Component Value Date     08/07/2019    POTASSIUM " 3.8 08/07/2019    CHLORIDE 110 (H) 08/07/2019    CO2 24 08/07/2019    ANIONGAP 7 08/07/2019     (H) 08/07/2019    BUN 15 08/07/2019    CR 0.72 08/07/2019    GFRESTIMATED 75 08/07/2019    GFRESTBLACK 87 08/07/2019    BRADLEY 9.0 08/07/2019      INR RESULTS:  Lab Results   Component Value Date    INR 1.8 (A) 08/23/2019    INR 2.1 (A) 08/16/2019            Medications     Current Outpatient Medications   Medication Sig Dispense Refill     acetaminophen (TYLENOL) 500 MG tablet Take 500 mg by mouth every 6 hours as needed for mild pain        acyclovir (ZOVIRAX) 400 MG tablet Take 1 tablet (400 mg) by mouth 2 times daily 60 tablet 3     Carboxymethylcellulose Sod PF (REFRESH PLUS) 0.5 % SOLN ophthalmic solution 1 drop 4 times daily as needed for dry eyes       dexamethasone (DECADRON) 4 MG tablet Take 20mg (5 tablets) by mouth every week. 28 tablet 3     furosemide (LASIX) 20 MG tablet Take 2 tablets (40 mg) by mouth daily 180 tablet 1     latanoprost (XALATAN) 0.005 % ophthalmic solution Place 1 drop into the right eye At Bedtime       lidocaine-prilocaine (EMLA) cream Apply to port site 1 hour prior to access 30 g 1     Multiple Vitamins-Minerals (DAILY MULTIVITAMIN) CAPS Take 1 tablet by mouth daily        oxyCODONE-acetaminophen (PERCOCET) 5-325 MG tablet Take 1 tablet by mouth every 4 hours as needed for breakthrough pain 30 tablet 0     polyethylene glycol (MIRALAX/GLYCOLAX) powder Take 17 g by mouth daily as needed for constipation        potassium chloride (KLOR-CON) 20 MEQ packet Take 20 mEq by mouth daily 60 packet 1     prochlorperazine (COMPAZINE) 10 MG tablet Take 1 tablet (10 mg) by mouth every 6 hours as needed for nausea or vomiting 30 tablet 1     SIMBRINZA 1-0.2 % ophthalmic suspension Place 1 drop into the right eye 2 times daily 1 drop AM and PM  2     Sodium Fluoride (SF 5000 PLUS) 1.1 % CREA Apply to affected area 3 times daily       triamcinolone (KENALOG) 0.1 % external cream Apply topically 2  times daily 15 g 1     warfarin (COUMADIN) 4 MG tablet Take 0.5-1 tablets (2-4 mg) by mouth daily As directed by INR clinic 90 tablet 0     warfarin (COUMADIN) 4 MG tablet Take one tablet (4 mg) by mouth on Tuesdays, Thursdays  and Saturdays; take one-half tablet ( 2 mg) on all other days of the week.            Past Medical History     Past Medical History:   Diagnosis Date     Abnormal CXR 2018    then ct done and not significant     Acute diastolic heart failure (H) 03/22/2019    nl ef, 2+mr and tr with severe pulm htn     Ascending aorta dilatation (H) 04/2016    on echo, mild, fu 7/18 4.0, slightly larger     Cancer, metastatic to bone (H)     due to myeloma     Colonic polyp 2008    adenomatous, fu 2013 tics only     Compression fracture 2016    multiple areas of spine     Dry eyes      Elevated MCV 2015    b12 and folic acid nl     HTN (hypertension) 2000    off meds for years     Lung nodule 08/2018    on ct, 4mm, ct done for fu abnl cxr     Menorrhagia 2002    hysteroscopy and d and c done     MGUS (monoclonal gammopathy of unknown significance) 2015    eval by Dr. Roberts     Multiple myeloma (H) 2016    dx 5/16 at [place], bone lesions seen on mri 6/16     OAB (overactive bladder) 2013    Dr. Grullon     Osteoporosis     fu done 2010 and stable, went off meds then, fu done 2013; has had gyn fu and added evista 2013 by gyn     Palpitations 4/16    nl echo, mildly dilated asc aorta     Paroxysmal atrial fibrillation (H) 4/16    had palp and ziopatch showed it, echo nl lv fxn, mild mr and tr, added coum and toprol, toprol dose raised 12/22/16     Pulmonary hypertension (H) 03/2019    seen on echo     Sciatica of left side 12/13    Dr. Helen Ricardo 2004     SVT (supraventricular tachycardia) (H) 4/16    on ziopatch     Thrombocytopenia (H) 2014     Past Surgical History:   Procedure Laterality Date     BONE MARROW BIOPSY, BONE SPECIMEN, NEEDLE/TROCAR N/A 5/25/2016    Procedure: BIOPSY BONE MARROW;   Surgeon: Bryan Patel MD;  Location:  GI     CATARACT IOL, RT/LT        SECTION  1965, 1966     COLONOSCOPY  2013    Procedure: COLONOSCOPY;  COLONOSCOPY;  Surgeon: Steffany Rockwell MD;  Location:  GI     EP ABLATION AV NODE N/A 2019    Procedure: EP Ablation AV Node;  Surgeon: Lee Menchaca MD;  Location:  HEART CARDIAC CATH LAB     EP PACEMAKER N/A 2019    Procedure: EP Pacemaker;  Surgeon: Lee Menchaca MD;  Location:  HEART CARDIAC CATH LAB     EXCISE EXOSTOSIS TIBIA / FIBULA  2014    Procedure: EXCISE EXOSTOSIS TIBIA / FIBULA;  Surgeon: Naila Pichardo MD;  Location:  SD     hysteroscopy and d and c      due to bleeding     left anle replacement       right ankle surgery       Family History   Problem Relation Age of Onset     Heart Disease Father      C.A.D. Mother      Cerebrovascular Disease Brother      Family History Negative Sister      Family History Negative Sister      Family History Negative Brother             Allergies   Blood transfusion related (informational only) and Penicillin [penicillins]        DAYSI Arellano CNP  [facility]  Pager: [phone]      Thank you for allowing me to participate in the care of your patient.      Sincerely,     DAYSI Arellano CNP     [facility]

## 2019-08-30 ENCOUNTER — PATIENT OUTREACH (OUTPATIENT)
Dept: CARE COORDINATION | Facility: CLINIC | Age: 84
End: 2019-08-30

## 2019-08-30 ENCOUNTER — TELEPHONE (OUTPATIENT)
Dept: FAMILY MEDICINE | Facility: CLINIC | Age: 84
End: 2019-08-30

## 2019-08-30 DIAGNOSIS — Z79.01 LONG TERM CURRENT USE OF ANTICOAGULANT THERAPY: ICD-10-CM

## 2019-08-30 LAB — INR PPP: 2.2 (ref 0.8–1.1)

## 2019-08-30 NOTE — PROGRESS NOTES
Clinic Care Coordination Contact    Situation: Patient chart reviewed by care coordinator.    Background:  [facility] admission 7/3-7/7/2019-- [facility]--    Metabolic encephalopathy, resolved    Hypercalcemia    Chronic atrial fibrillation with RVR    HTN    Chronic diastolic heart failure    Severe pulmonary HTN    Hx of SVT    Metastatic Multiple myeloma     Severe malnutrition    Deconditioning    Assessment:      8/8/2019  Home care orders   Orders are needed for this patient. OT     PT: na     OT: 2xwk/3w, for strength & conditioning, bathroom equipment and transfers, ADLs, falls prevention      Plan/Recommendations: CC will follow up when discharged from home care     Anjali Jackson RN, Care Coordinator   [facility] -Care Coordination  [facility] , [facility] and [facility]   [phone]

## 2019-08-30 NOTE — TELEPHONE ENCOUNTER
Reason for Call:  INR    Who is calling?  [facility]    Phone number:  [phone]      Name of caller: Jessica    INR Value:  2.2    Are there any other concerns:  No- Lasix were increased yesterday to 40 MG daily     Can we leave a detailed message on this number? YES        Call taken on 8/30/2019 at 9:07 AM by Lee Ann Howard

## 2019-08-30 NOTE — TELEPHONE ENCOUNTER
ANTICOAGULATION MANAGEMENT     Patient Name:  Amira Arreola  Date:  2019    ASSESSMENT /SUBJECTIVE:      Today's INR result of 2.2 is Therapeutic. Goal INR of 2.0-3.0      Warfarin dose taken: Warfarin taken as previously instructed    Diet: No new diet changes affecting INR    Medication changes/ interactions: No new medications/supplements affecting INR - lasix dose increased    Previous INR Subtherapeutic     S/S of bleeding or thromboembolism No    New injury or illness:  No    Upcoming surgery, procedure or cardioversion:  No    Additional findings: nonoe      Plan    Spoke with Jessica home care nurse regarding INR result and instructed     Warfarin Dosing Instructions:Continue your current warfarin dose of 4 mg every Tues, Sat; 2 mg all other days    Instructed patient to follow up no later than: 1 week    Education provided: No      Jessica verbalizes understanding and agrees to warfarin dosing plan.    Instructed to call the Anticoagulation Clinic for any changes, questions or concerns. (#[phone])        OBJECTIVE    INR   Date Value Ref Range Status   2019 2.2 (A) 0.8 - 1.1 Final             Anticoagulation Summary  As of 2019    INR goal:   2.0-3.0   TTR:   66.7 % (3.1 y)   INR used for dosin.2 (2019)   Warfarin maintenance plan:   4 mg (4 mg x 1) every Tue, Sat; 2 mg (4 mg x 0.5) all other days   Full warfarin instructions:   4 mg every Tue, Sat; 2 mg all other days   Weekly warfarin total:   18 mg   Plan last modified:   Jessica Charlton, RN (3/13/2019)   Next INR check:   2019   Priority:   INR   Target end date:   Indefinite    Indications    Long term current use of anticoagulant therapy [Z79.01]  Atrial fibrillation (H) [I48.91] (Resolved) [I48.91]             Anticoagulation Episode Summary     INR check location:       Preferred lab:       Send INR reminders to:   DANTE MOYA    Comments:    INR to be drawn at infusion visit OR home care nurse. If scheduled for  Homecare, Please notify  Zhane [phone] Patient can be reached at home phone [phone]. Do not leave dosing with spouse      Anticoagulation Care Providers     Provider Role Specialty Phone number    Addy Frias MD [facility] Internal Medicine [phone]

## 2019-08-30 NOTE — TELEPHONE ENCOUNTER
Reason for Call:  Home Health Care    Savana with [facility] called regarding (reason for call):     Orders are needed for this patient.     OT: Patient cancelled one OT appointment this week.  Home care requesting an order for 1 visit next week.     Pt Provider: Altagracia    Phone Number Homecare Nurse can be reached at: [phone]    Can we leave a detailed message on this number? YES      Call taken on 8/30/2019 at 8:34 AM by Joslyn Dunham

## 2019-08-30 NOTE — TELEPHONE ENCOUNTER
Left message on Savana's VM. OV recently, so okay for OT visit.  Tigist Corral RN on 8/30/2019 at 9:02 AM

## 2019-09-04 ENCOUNTER — INFUSION THERAPY VISIT (OUTPATIENT)
Dept: INFUSION THERAPY | Facility: CLINIC | Age: 84
End: 2019-09-04
Attending: INTERNAL MEDICINE
Payer: MEDICARE

## 2019-09-04 DIAGNOSIS — Z95.828 PORT-A-CATH IN PLACE: Primary | ICD-10-CM

## 2019-09-04 PROCEDURE — 25000128 H RX IP 250 OP 636: Performed by: INTERNAL MEDICINE

## 2019-09-04 PROCEDURE — 96523 IRRIG DRUG DELIVERY DEVICE: CPT

## 2019-09-04 RX ORDER — HEPARIN SODIUM (PORCINE) LOCK FLUSH IV SOLN 100 UNIT/ML 100 UNIT/ML
500 SOLUTION INTRAVENOUS EVERY 8 HOURS
Status: CANCELLED
Start: 2019-09-04

## 2019-09-04 RX ORDER — HEPARIN SODIUM (PORCINE) LOCK FLUSH IV SOLN 100 UNIT/ML 100 UNIT/ML
500 SOLUTION INTRAVENOUS EVERY 8 HOURS
Status: DISCONTINUED | OUTPATIENT
Start: 2019-09-04 | End: 2019-09-04 | Stop reason: HOSPADM

## 2019-09-04 RX ADMIN — HEPARIN SODIUM (PORCINE) LOCK FLUSH IV SOLN 100 UNIT/ML 500 UNITS: 100 SOLUTION at 13:44

## 2019-09-04 NOTE — PROGRESS NOTES
Infusion Nursing Note:  Amiragino Roseon presents today for port flush.    Patient seen by provider today: No   present during visit today: Not Applicable.    Note: N/A.    Intravenous Access:  Implanted Port.    Treatment Conditions:  Not Applicable.      Post Infusion Assessment:  Site patent and intact, free from redness, edema or discomfort.  No evidence of extravasations.  Access discontinued per protocol.       Discharge Plan:   Discharge instructions reviewed with: Patient.  Patient and/or family verbalized understanding of discharge instructions and all questions answered.  Copy of AVS reviewed with patient and/or family.  Patient will return 10/3/19 for next appointment.  Patient discharged in stable condition accompanied by: son.  Departure Mode: Wheelchair.    Ladonna Boo, RN, RN

## 2019-09-05 ENCOUNTER — CARE COORDINATION (OUTPATIENT)
Dept: CARDIOLOGY | Facility: CLINIC | Age: 84
End: 2019-09-05

## 2019-09-05 NOTE — PROGRESS NOTES
"[facility] C[facility]     Current home weights: patient did not give me specific days/weights, she stated \"the weight has been running 132 - 134#\"  Weight parameters: ~130#  Heart Failure sx: LE swelling  Daily diuretic plan:    lasix 40mg/d(increase as of 8/29 per Elisabeth Means)  Next follow up:   Future Appointments   Date Time Provider Department Center   9/30/2019  2:30 PM [facility] LAB [facility]   9/30/2019  3:10 PM Elisabeth Means APRN CNP SUUM[facility]   10/2/2019  9:50 AM SH LAB DRAW 1 [facility]   10/9/2019 10:40 AM Shayne Roberts MD [facility]   12/2/2019 12:00 AM MA TECH1 [facility]     Called patient to assess response to increased lasix dose. Patient last seen by Elisabeth Means on 8/29. Per her clinic notes:  Weight today 135#, which has been slowly increasing since transitioning home from [place], with increasing lower extremity edema. Patient appears compensated and volume up on exam with +2 lower extremity edema and JVD elevated at 10cm..... Will increase diuretic regimen today.... INCREASE furosemide to 40mg daily    Patient today stating she feels no improvement on the increased lasix dose. She feels her weights no different and her LE's remain swollen to the degree they were when she saw GARRY on 8/29. Her breathing remains stable, she has no SOB @ rest, no orthopnea/PND, stable SALAZAR.     Will forward to GARRY for review.  Bernarda Lira RN on 9/5/2019 at 11:13 AM        "

## 2019-09-05 NOTE — PROGRESS NOTES
Called patient w/instructions re:increased lasix dose. She also requested I contact her daughter Yesi as she is the one who helps set up patients medications. Call attempt - no answer and VM full. Note sent to board to call Monday to assess response.  Bernarda Lira RN on 9/5/2019 at 2:08 PM

## 2019-09-05 NOTE — PROGRESS NOTES
Encounter reviewed. Please advise patient to increase furosemide to 40mg BID x 3 days. Please call patient Monday for update on weight/symptoms.     Please move CORE follow up to next week.     DAYSI Arellano CNP  Text Page  (M-F, 7:30 - 4:00 pm)

## 2019-09-06 ENCOUNTER — TELEPHONE (OUTPATIENT)
Dept: FAMILY MEDICINE | Facility: CLINIC | Age: 84
End: 2019-09-06

## 2019-09-06 DIAGNOSIS — Z79.01 LONG TERM CURRENT USE OF ANTICOAGULANT THERAPY: ICD-10-CM

## 2019-09-06 LAB — INR PPP: 1.8 (ref 0.8–1.1)

## 2019-09-06 NOTE — TELEPHONE ENCOUNTER
ANTICOAGULATION MANAGEMENT     Patient Name:  Amira Arreola  Date:  2019    ASSESSMENT /SUBJECTIVE:      Today's INR result of 1.8 is Subtherapeutic. Goal INR of 2.0-3.0      Warfarin dose taken: Warfarin taken as previously instructed    Diet: No new diet changes affecting INR    Medication changes/ interactions: No new medications/supplements affecting INR    Previous INR Therapeutic     S/S of bleeding or thromboembolism No    New injury or illness:  No    Upcoming surgery, procedure or cardioversion:  No    Additional findings: Patient being discharged from home care      Plan    Spoke with Rajiv Home Care Nurse regarding INR result and instructed       Warfarin Dosing Instructions: Boost dose of 4 mg today, then 4 mg Tue Sat, and 2 mg all other days    Instructed patient to follow up no later than: 1 week; Clinic INR    Education provided: No      Rajiv, Nurse,  verbalizes understanding and agrees to warfarin dosing plan.    Instructed to call the Anticoagulation Clinic for any changes, questions or concerns. (#[phone])        OBJECTIVE    INR   Date Value Ref Range Status   2019 1.8 (A) 0.8 - 1.1 Final             Anticoagulation Summary  As of 2019    INR goal:   2.0-3.0   TTR:   66.6 % (3.1 y)   INR used for dosin.8! (2019)   Warfarin maintenance plan:   4 mg (4 mg x 1) every Tue, Sat; 2 mg (4 mg x 0.5) all other days   Full warfarin instructions:   : 4 mg; Otherwise 4 mg every Tue, Sat; 2 mg all other days   Weekly warfarin total:   18 mg   Plan last modified:   Jessica Charlton RN (3/13/2019)   Next INR check:   2019   Priority:   INR   Target end date:   Indefinite    Indications    Long term current use of anticoagulant therapy [Z79.01]  Atrial fibrillation (H) [I48.91] (Resolved) [I48.91]             Anticoagulation Episode Summary     INR check location:       Preferred lab:       Send INR reminders to:   DANTE MOYA    Comments:    INR to be drawn at infusion  visit Please notify  Zhane [phone] Patient can be reached at home phone [phone]. Do not leave dosing with spouse      Anticoagulation Care Providers     Provider Role Specialty Phone number    Addy Frias MD [facility] Internal Medicine [phone]

## 2019-09-09 NOTE — PROGRESS NOTES
Call back to patients daughter Olesya. She agreed to what her mother had reported earlier today that her breathing is stable and that there is no new/increased LE swelling. As a result, patient will continue on lasix 40mg/d. I reviewed w/Disa the specific sx to be concerned about and when to contact us. She verbalized understanding and had no further questions.  Bernarda Lira RN on 9/9/2019 at 2:50 PM

## 2019-09-09 NOTE — PROGRESS NOTES
"Called patient to assess response to extra lasix doses. She stated that when she talked to her daughter about taking the extra lasix, her daughter felt she was already taking too much. As a result the patient only took the 40mg bid X1 day.    Recent home weights:   9/9 132   9/8 132.5   9/7 ~130  Current diuretic regimen:   lasix 40mg/d    Attempted to contact patients daughter Yesi - no answer, VM full.     Patient stating she feels \"fine.\"  She denied SOB/SALAZAR. She feels her LE swelling has gotten better. She currently is wearing the compression stockings. She denied any other areas of swelling or any abdominal bloating. Will forward to Elisabeth Means NP for review.  Bernarda Lira RN on 9/9/2019 at 11:17 AM    "

## 2019-09-09 NOTE — PROGRESS NOTES
Incoming call from patients daughter Olesya Salgado. This daughter was at patients appt w/Elisabeth on 8/29. She is calling for clarification re:lasix dosing. I reviewed w/her the recent conversations I have had w/her mother and the recommendation to increase the lasix to 40mg bid X3 days. Disa confirmed that this likely only happened X1 day. Will await GARRY response and communicate plan to patient and Disa.  Bernarda Lira RN on 9/9/2019 at 12:32 PM

## 2019-09-09 NOTE — PROGRESS NOTES
How did her daughter say the patient was doing - SOB, SALAZAR, lower extremity edema?    If symptoms are stable okay to continue furosemide 40mg daily.

## 2019-09-18 ENCOUNTER — TELEPHONE (OUTPATIENT)
Dept: ONCOLOGY | Facility: CLINIC | Age: 84
End: 2019-09-18

## 2019-09-18 NOTE — TELEPHONE ENCOUNTER
Pt called to request a refill of oxycodone. Would you be willing to sign it for her to ?  Thank you   Trini Ramirez RN

## 2019-09-19 DIAGNOSIS — C79.51 CANCER, METASTATIC TO BONE (H): ICD-10-CM

## 2019-09-19 RX ORDER — OXYCODONE AND ACETAMINOPHEN 5; 325 MG/1; MG/1
1 TABLET ORAL EVERY 4 HOURS PRN
Qty: 30 TABLET | Refills: 0 | Status: SHIPPED | OUTPATIENT
Start: 2019-09-19 | End: 2019-09-27

## 2019-09-19 NOTE — TELEPHONE ENCOUNTER
"I called to inform patient that script has been signed and is available to be picked up at the .. Patient  Informed me \" my daughter had an extra script\" , and was able to fill the medication.     This script is NOT needed.    Gabriela Lee RN    "

## 2019-09-27 ENCOUNTER — PATIENT OUTREACH (OUTPATIENT)
Dept: ONCOLOGY | Facility: CLINIC | Age: 84
End: 2019-09-27

## 2019-09-27 DIAGNOSIS — C79.51 CANCER, METASTATIC TO BONE (H): ICD-10-CM

## 2019-09-27 RX ORDER — OXYCODONE AND ACETAMINOPHEN 5; 325 MG/1; MG/1
1 TABLET ORAL EVERY 4 HOURS PRN
Qty: 30 TABLET | Refills: 0 | Status: SHIPPED | OUTPATIENT
Start: 2019-09-27 | End: 2019-10-09

## 2019-09-27 NOTE — PROGRESS NOTES
Amira called clinic stating that she needs a refill of her Oxycodone medication . She stated that she only has a couple left. Consulted with GEETHA Brooks who will refill her Oxycodone. Amira will have a a family member  9/28/19.

## 2019-09-30 ENCOUNTER — OFFICE VISIT (OUTPATIENT)
Dept: CARDIOLOGY | Facility: CLINIC | Age: 84
End: 2019-09-30
Attending: NURSE PRACTITIONER
Payer: MEDICARE

## 2019-09-30 VITALS
HEART RATE: 68 BPM | SYSTOLIC BLOOD PRESSURE: 122 MMHG | HEIGHT: 65 IN | BODY MASS INDEX: 22.84 KG/M2 | WEIGHT: 137.1 LBS | OXYGEN SATURATION: 100 % | DIASTOLIC BLOOD PRESSURE: 67 MMHG

## 2019-09-30 DIAGNOSIS — E87.6 HYPOKALEMIA: ICD-10-CM

## 2019-09-30 DIAGNOSIS — I10 BENIGN ESSENTIAL HYPERTENSION: ICD-10-CM

## 2019-09-30 DIAGNOSIS — I27.20 PULMONARY HYPERTENSION (H): ICD-10-CM

## 2019-09-30 DIAGNOSIS — I50.32 CHRONIC DIASTOLIC HEART FAILURE (H): Primary | ICD-10-CM

## 2019-09-30 DIAGNOSIS — I48.20 CHRONIC ATRIAL FIBRILLATION (H): ICD-10-CM

## 2019-09-30 LAB
ANION GAP SERPL CALCULATED.3IONS-SCNC: 8.4 MMOL/L (ref 6–17)
BUN SERPL-MCNC: 13 MG/DL (ref 7–30)
CALCIUM SERPL-MCNC: 9.8 MG/DL (ref 8.5–10.5)
CHLORIDE SERPL-SCNC: 103 MMOL/L (ref 98–107)
CO2 SERPL-SCNC: 28 MMOL/L (ref 23–29)
CREAT SERPL-MCNC: 0.94 MG/DL (ref 0.7–1.3)
GFR SERPL CREATININE-BSD FRML MDRD: 56 ML/MIN/{1.73_M2}
GLUCOSE SERPL-MCNC: 104 MG/DL (ref 70–105)
POTASSIUM SERPL-SCNC: 3.4 MMOL/L (ref 3.5–5.1)
SODIUM SERPL-SCNC: 136 MMOL/L (ref 136–145)

## 2019-09-30 PROCEDURE — 36415 COLL VENOUS BLD VENIPUNCTURE: CPT | Performed by: NURSE PRACTITIONER

## 2019-09-30 PROCEDURE — 99215 OFFICE O/P EST HI 40 MIN: CPT | Performed by: NURSE PRACTITIONER

## 2019-09-30 PROCEDURE — 80048 BASIC METABOLIC PNL TOTAL CA: CPT | Performed by: NURSE PRACTITIONER

## 2019-09-30 RX ORDER — POTASSIUM CHLORIDE 1.5 G/1.58G
20 POWDER, FOR SOLUTION ORAL 2 TIMES DAILY
Qty: 180 PACKET | Refills: 1 | Status: SHIPPED | OUTPATIENT
Start: 2019-09-30 | End: 2020-01-03 | Stop reason: ALTCHOICE

## 2019-09-30 RX ORDER — FUROSEMIDE 20 MG
60 TABLET ORAL DAILY
Qty: 270 TABLET | Refills: 1 | Status: SHIPPED | OUTPATIENT
Start: 2019-09-30 | End: 2019-11-04

## 2019-09-30 ASSESSMENT — MIFFLIN-ST. JEOR: SCORE: 1057.76

## 2019-09-30 NOTE — PATIENT INSTRUCTIONS
1. INCREASE furosemide to 60mg daily (3 tablets)   2. INCREASE potassium to 20meq 2 x per day (2 packets 2 x a day)  3. Continue monitoring weight, try limiting sodium intake and limit fluid intake.   4. Get compression stockings, try wearing as much as possible  5. Follow up with Elisabeth in 1 month with labs  6. Please call with any additional questions/concerns [phone]

## 2019-09-30 NOTE — LETTER
9/30/2019    Addy Frias MD  [address]  [place] [address]    RE: Amira Arreola       Dear Colleague,    I had the pleasure of seeing Amira Arreola in the [facility].    Cardiology Clinic Progress Note  Amira Arreola MRN# [id number]   YOB: 1932 Age: 87 year old   Primary Cardiologist: Dr. Rivera Reason for visit: CORE follow up            Assessment and Plan:   Amira Arreola is a very pleasant 86 year old female with a history of HFpEF, chronic atrial fibrillation s/p AV solomon ablation with PPM implantation 7/5/19, hypertension, mitral regurgitation, tricuspid regurgitation and hx of multiple myeloma mets to bone (dx 2016, currently being treated with Daratumab, Velcade, and Dexamethasone every 2 weeks). Patient with 2 recent hospitalizations, 7/3/19-7/7/19 and  6/28/19-7/1/19, see below for details. Patient here today for CORE follow up.    1.  Chronic diastolic heart failure/HFpEF - Echocardiogram completed 3/23/19 LVEF 55-60%, no wall motion abnormalities, moderate mitral regurgitation, moderate tricuspid regurgitation, and severe pulmonary hypertension. Weight today 137#, which has continued to increase despite adjustments in diuretic, this is secondary to dietary indiscretions with sodium and fluid intake at home. Most meals she is eating are picked up from [employer] or [employer] by their son. Patient and family note the son is doing the best he can. Reviewed low sodium meal options that you can order.  since transitioning home from [place], with increasing lower extremity edema. Patient appears compensated and volume up on exam with persisting +2-3 lower extremity edema. Overall HF symptoms have been significantly improved with control of her atrial fibrillation with RVR which was exacerbating HF. Labs from today show stable kidney function, potassium slightly low at 3.4. Will increase diuretic regimen and potassium today.    - NYHA class III,  stage C   - Fluid status : euvolemic   - Dry weight : ~ 130#   - Diuretic regimen : INCREASE furosemide to 60mg daily   - INCREASE potassium to 20meq BID   - CORE RN to call patient next week to check on weight/leg edema. If weight remains elevated > 130# and lower extremity edema persists, will increase furosemide to 80mg daily (will also likely need to increase potassium).    - Aldosterone antagonist : none   - Blood pressure : controlled   - Reinforced HF education, reviewed low sodium diet, reviewed foods to avoid. Also reviewed low sodium meals to order.   - Prescription for compression stockings given today     2. Chronic atrial fibrillation - s/p AV solomon ablation with PPM implantation 7/5/19, stable, significant improvement in symptoms since ablation and heart rate control achieved.    - [id number] score 5 (HTN, HF, age, female)   - Continue warfarin for thromboembolic prophylaxis.     3. Moderate mitral regurgitation, moderate tricuspid regurgitation   - Will consider repeat echocardiogram in the future when patient is euvolemic or if worsening symptoms, currently won't change any management clinically.      4. Severe pulmonary hypertension - likely secondary to HFpEF and likely improved with diuresis.    - Will consider repeat echocardiogram in the future     5. Multiple myeloma with mets to bone - follows with Dr. Roberts   - Chemo therapy currently on hold, patient has follow up with Dr. Roberts in 2 weeks with labs.     7. Advance care planning - patient and family met with palliative care while hospitalized, code status changed to DNR/DNI, patient still wishing to seek restorative care. Patient and family have a reasonable understanding of her current health status. Patient has very supportive/involved family.    - Health care directive completed, electing her daughter Eufemia as POA.    - Reviewed that palliative care is available in the clinic for further support in the future. Patient and daughter note  they will reach out if interested in meeting with Dr. Farris.     Changes today: INCREASE furosemide to 60mg daily, INCREASE potassium 20meq BID    Follow up plan:     Follow up with CORE in 1 month    CORE RN to call patient and daughter (Mckenna) [phone]        History of Presenting Illness:    Amira Arreola is a very pleasant 86 year old female with a history of HFpEF, chronic atrial fibrillation, hypertension, mitral regurgitation, tricuspid regurgitation and hx of multiple myeloma mets to bone (dx 2016, currently being treated with Daratumab, Velcade, and Dexamethasone every 2 weeks).     Patient with 2 recent hospitalizations 7/3/19-7/7/19 presenting from the cardiology office with hypotension and altered mental status felt to be related to hypovolemia and hypercalcemia. Patient was in atrial fibrillation with RVR. Serial troponin negative. Unable to tolerate rate control medications due to hypotension s/p AV solomon ablation with PPM 7/5/19. Furosemide on hold during most of hospital stay due to concerns of hypovolemia, resumed at lower dosage 20mg at discharge. S/p thoracentesis 7/5 250cc removed, transudative. Palliative care met with patient/family during hospital stay, code status changed to DNR/DNI but family still wishing to receive restorative care. Discharge weight 131#. Patient discharged to [place]. 6/28/19-7/1/19 related to atrial fibrillation with RVR, acute on chronic diastolic heart failure and bilateral pleural effusions. BNP 13K on admission, 2K at time of discharge. Diltiazem increased to 180mg/day. Prior to this patient was hospitalized in March, see below for details.     Echocardiogram completed 3/23/19 LVEF 55-60%, no wall motion abnormalities, moderate mitral regurgitation, moderate tricuspid regurgitation, and severe pulmonary hypertension.     Patient was last seen in CORE clinic by me on 8/29/19 for CORE follow up, this summer patients atrial fibrillation with RVR was making  management of her HF difficulty, she underwent AV noral ablation with PPM implantation and since has had significant improvement in symptoms. During CORE visit she was noted to have worsening lower extremity edema, elevated JVD and weight had slowly been increasing since transitioning home. It was recommended that patient increase her furosemide to 40mg daily. CORE RN called the following week and patient reported no change in symptoms or weight, advised to increase furosemide to 40mg BID x 3 days, which appears was maybe done for 1 day. RN followed up with daughter who noted no worsening dyspnea or edema, advised to continue 40mg daily.     Patient is here today for CORE followup. Daughter present for clinic visit. Patient and daughter note that things are going well at home. Monitoring weights daily at home, also noting some gradual weight gain, unable to recall home weights. Patient/daughter has noted some worsening lower extremity edema. Some slight abdominal distention/bloating, but states better than in the past. Denies shortness of breath at rest. Denies exertional dyspnea with currently levels of activity. Sleeping good. Denies orthopnea or PND. Sleeping with 2 pillows. Patient denies chest pain or chest tightness. Denies dizziness, lightheadedness or other presyncopal symptoms. Denies tachycardia or palpitations. Denies falls. Denies bleeding episodes. Some complaints of chronic back pain, no changes or worsening.     Labs from today show stable kidney function, creatinine 0.94 (which is up from last month 0.72), potassium low at 3.4, otherwise electrolytes stable. Blood pressure 122/67 and HR 68.    Appetite is good. Son helps with dinner, patient notes that many items are things that are just warmed up in the oven, picking up meals from [employer] and [employer] foods. Not adding salt to foods. Patient reports drinking 2 cans soda, milk with meals, cup coffee in morning and also water. No tobacco or alcohol use.  Using a walker for assistance at home. She had homemaker services 3 x a week for 4 hours to help with cleaning and bathing. Daughter setting up pill boxes.          Recent Hospitalizations   7/3/19-7/7/19 presenting from the cardiology office with hypotension and altered mental status felt to be related to hypovolemia and hypercalcemia. Patient was in atrial fibrillation with RVR. Serial troponin negative. Unable to tolerate rate control medications due to hypotension s/p AV solomon ablation with PPM 7/5/19. Furosemide on hold during most of hospital stay due to concerns of hypovolemia, resumed at lower dosage 20mg at discharge. S/p thoracentesis 7/5 250cc removed, transudative. Palliative care met with patient/family during hospital stay, still wishing to receive restorative care.   6/28/19-7/1/19 related to atrial fibrillation with RVR, acute on chronic diastolic heart failure and bilateral pleural effusions. BNP 13K on admission, 2K at time of discharge. Diltiazem increased to 180mg/day.  3/22/19-3/28/19 presented with dyspnea, leg swelling and intermittent palpitations. Patient was found to have atrial fibrillation with RVR and acute diastolic heart failure exacerbation. Weight 3/24/19 147# (doesn't appear admission weight was completed). Discharge weight 135#.        Social History    , 6 children, Enjoys keeping the house clean and orderly. Retired nurse.   Social History     Socioeconomic History     Marital status:      Spouse name: Tom     Number of children: 6     Years of education: Not on file     Highest education level: Not on file   Occupational History     Occupation: rn anesthetist     Employer: RETIRED   Social Needs     Financial resource strain: Not on file     Food insecurity:     Worry: Not on file     Inability: Not on file     Transportation needs:     Medical: Not on file     Non-medical: Not on file   Tobacco Use     Smoking status: Never Smoker     Smokeless tobacco: Never  "Used   Substance and Sexual Activity     Alcohol use: No     Alcohol/week: 0.0 standard drinks     Drug use: No     Sexual activity: Never   Lifestyle     Physical activity:     Days per week: Not on file     Minutes per session: Not on file     Stress: Not on file   Relationships     Social connections:     Talks on phone: Not on file     Gets together: Not on file     Attends [employer] service: Not on file     Active member of club or organization: Not on file     Attends meetings of clubs or organizations: Not on file     Relationship status: Not on file     Intimate partner violence:     Fear of current or ex partner: Not on file     Emotionally abused: Not on file     Physically abused: Not on file     Forced sexual activity: Not on file   Other Topics Concern     Parent/sibling w/ CABG, MI or angioplasty before 65F 55M? Not Asked   Social History Narrative     Not on file            Review of Systems:   Skin:  Positive for bruising   Eyes:  Positive for glaucoma  ENT:  Negative    Respiratory:  Negative    Cardiovascular:    fatigue;Positive for;edema  Gastroenterology: Negative    Genitourinary:  Negative    Musculoskeletal:  Positive for arthritis  Neurologic:  Positive for local weakness;incoordination  Psychiatric:  Negative    Heme/Lymph/Imm:  Positive for allergies  Endocrine:  Negative           Physical Exam:   Vitals: /67   Pulse 68   Ht 1.651 m (5' 5\")   Wt 62.2 kg (137 lb 1.6 oz)   SpO2 100%   BMI 22.81 kg/m      Wt Readings from Last 4 Encounters:   09/30/19 62.2 kg (137 lb 1.6 oz)   08/29/19 61.2 kg (135 lb)   07/30/19 59.9 kg (132 lb)   07/23/19 59.1 kg (130 lb 4.8 oz)     GEN: frail, elderly  HEENT:  Pupils equal, round. Sclerae nonicteric.   NECK: Supple, no masses appreciated.   C/V:  Irregularly irregular rate and rhythm, no murmur, rub or gallop.   RESP: Respirations are unlabored. Clear bilaterally. Diminished in bases.   GI: Abdomen distended, nontender.  EXTREM: +2-3 bilateral " lower extremity edema, pretibial to approximately 3 inches below the knee  NEURO: Alert and cooperative.  SKIN: Warm and dry.        Data:   ECHO 3/23/19  The left ventricle is normal in size.  The visual ejection fraction is estimated at 55-60%.  No regional wall motion abnormalities noted.  There is moderate (2+) mitral regurgitation.  There is moderate (2+) tricuspid regurgitation.  Severe (>55mmHg) pulmonary hypertension is present.  The rhythm was rapid atrial fibrillation.    LIPID RESULTS:  Lab Results   Component Value Date    CHOL 160 10/12/2016    HDL 76 10/12/2016    LDL 71 10/12/2016    TRIG 66 10/12/2016    CHOLHDLRATIO 2.3 09/21/2015     LIVER ENZYME RESULTS:  Lab Results   Component Value Date    AST 20 08/07/2019    ALT 15 08/07/2019     CBC RESULTS:  Lab Results   Component Value Date    WBC 10.4 08/07/2019    RBC 3.80 08/07/2019    HGB 11.9 08/07/2019    HCT 36.8 08/07/2019    MCV 97 08/07/2019    MCH 31.3 08/07/2019    MCHC 32.3 08/07/2019    RDW 14.7 08/07/2019     08/07/2019     BMP RESULTS:  Lab Results   Component Value Date     09/30/2019    POTASSIUM 3.4 (L) 09/30/2019    CHLORIDE 103 09/30/2019    CO2 28 09/30/2019    ANIONGAP 8.4 09/30/2019     09/30/2019    BUN 13 09/30/2019    CR 0.94 09/30/2019    GFRESTIMATED 56 (L) 09/30/2019    GFRESTBLACK 68 09/30/2019    BRADLEY 9.8 09/30/2019      INR RESULTS:  Lab Results   Component Value Date    INR 1.8 (A) 09/06/2019    INR 2.2 (A) 08/30/2019            Medications     Current Outpatient Medications   Medication Sig Dispense Refill     acetaminophen (TYLENOL) 500 MG tablet Take 500 mg by mouth every 6 hours as needed for mild pain        acyclovir (ZOVIRAX) 400 MG tablet Take 1 tablet (400 mg) by mouth 2 times daily 60 tablet 3     Carboxymethylcellulose Sod PF (REFRESH PLUS) 0.5 % SOLN ophthalmic solution 1 drop 4 times daily as needed for dry eyes       dexamethasone (DECADRON) 4 MG tablet Take 20mg (5 tablets) by mouth every  week. 28 tablet 3     furosemide (LASIX) 20 MG tablet Take 3 tablets (60 mg) by mouth daily 270 tablet 1     latanoprost (XALATAN) 0.005 % ophthalmic solution Place 1 drop into the right eye At Bedtime       lidocaine-prilocaine (EMLA) cream Apply to port site 1 hour prior to access 30 g 1     Multiple Vitamins-Minerals (DAILY MULTIVITAMIN) CAPS Take 1 tablet by mouth daily        oxyCODONE-acetaminophen (PERCOCET) 5-325 MG tablet Take 1 tablet by mouth every 4 hours as needed for breakthrough pain 30 tablet 0     polyethylene glycol (MIRALAX/GLYCOLAX) powder Take 17 g by mouth daily as needed for constipation        potassium chloride (KLOR-CON) 20 MEQ packet Take 20 mEq by mouth 2 times daily 180 packet 1     prochlorperazine (COMPAZINE) 10 MG tablet Take 1 tablet (10 mg) by mouth every 6 hours as needed for nausea or vomiting 30 tablet 1     SIMBRINZA 1-0.2 % ophthalmic suspension Place 1 drop into the right eye 2 times daily 1 drop AM and PM  2     Sodium Fluoride (SF 5000 PLUS) 1.1 % CREA Apply to affected area 3 times daily       triamcinolone (KENALOG) 0.1 % external cream Apply topically 2 times daily 15 g 1     warfarin (COUMADIN) 4 MG tablet Take 0.5-1 tablets (2-4 mg) by mouth daily As directed by INR clinic 90 tablet 0     warfarin (COUMADIN) 4 MG tablet Take one tablet (4 mg) by mouth on Tuesdays, Thursdays  and Saturdays; take one-half tablet ( 2 mg) on all other days of the week.            Past Medical History     Past Medical History:   Diagnosis Date     Abnormal CXR 2018    then ct done and not significant     Acute diastolic heart failure (H) 03/22/2019    nl ef, 2+mr and tr with severe pulm htn     Ascending aorta dilatation (H) 04/2016    on echo, mild, fu 7/18 4.0, slightly larger     Cancer, metastatic to bone (H)     due to myeloma     Colonic polyp 2008    adenomatous, fu 2013 tics only     Compression fracture 2016    multiple areas of spine     Dry eyes      Elevated MCV 2015    b12 and  folic acid nl     HTN (hypertension)     off meds for years     Lung nodule 2018    on ct, 4mm, ct done for fu abnl cxr     Menorrhagia     hysteroscopy and d and c done     MGUS (monoclonal gammopathy of unknown significance)     eval by Dr. Roberts     Multiple myeloma (H) 2016    dx  at [place], bone lesions seen on mri      OAB (overactive bladder)     Dr. Grullon     Osteoporosis     fu done  and stable, went off meds then, fu done ; has had gyn fu and added evista 2013 by gyn     Palpitations     nl echo, mildly dilated asc aorta     Paroxysmal atrial fibrillation (H)     had palp and ziopatch showed it, echo nl lv fxn, mild mr and tr, added coum and toprol, toprol dose raised 16     Pulmonary hypertension (H) 2019    seen on echo     Sciatica of left side     Dr. Helen Ricardo      SVT (supraventricular tachycardia) (H)     on ziopatch     Thrombocytopenia (H)      Past Surgical History:   Procedure Laterality Date     BONE MARROW BIOPSY, BONE SPECIMEN, NEEDLE/TROCAR N/A 2016    Procedure: BIOPSY BONE MARROW;  Surgeon: Bryan Patel MD;  Location:  GI     CATARACT IOL, RT/LT        SECTION  1965, 1966     COLONOSCOPY  2013    Procedure: COLONOSCOPY;  COLONOSCOPY;  Surgeon: Steffany Rockwell MD;  Location:  GI     EP ABLATION AV NODE N/A 2019    Procedure: EP Ablation AV Node;  Surgeon: Lee Menchaca MD;  Location:  HEART CARDIAC CATH LAB     EP PACEMAKER N/A 2019    Procedure: EP Pacemaker;  Surgeon: Lee Menchaca MD;  Location:  HEART CARDIAC CATH LAB     EXCISE EXOSTOSIS TIBIA / FIBULA  2014    Procedure: EXCISE EXOSTOSIS TIBIA / FIBULA;  Surgeon: Naila Pichardo MD;  Location:  SD     hysteroscopy and d and c      due to bleeding     left anle replacement       right ankle surgery       Family History   Problem Relation Age of Onset     Heart Disease  Father      C.A.D. Mother      Cerebrovascular Disease Brother      Family History Negative Sister      Family History Negative Sister      Family History Negative Brother             Allergies   Blood transfusion related (informational only) and Penicillin [penicillins]        DAYSI Arellano CNP  [facility]  Pager: [phone]      Thank you for allowing me to participate in the care of your patient.      Sincerely,     DAYSI Arellano CNP     [facility]

## 2019-09-30 NOTE — PROGRESS NOTES
Cardiology Clinic Progress Note  Amira Arreola MRN# [id number]   YOB: 1932 Age: 87 year old   Primary Cardiologist: Dr. Rivera Reason for visit: CORE follow up            Assessment and Plan:   Amira Arreoal is a very pleasant 86 year old female with a history of HFpEF, chronic atrial fibrillation s/p AV solomon ablation with PPM implantation 7/5/19, hypertension, mitral regurgitation, tricuspid regurgitation and hx of multiple myeloma mets to bone (dx 2016, currently being treated with Daratumab, Velcade, and Dexamethasone every 2 weeks). Patient with 2 recent hospitalizations, 7/3/19-7/7/19 and  6/28/19-7/1/19, see below for details. Patient here today for CORE follow up.    1.  Chronic diastolic heart failure/HFpEF - Echocardiogram completed 3/23/19 LVEF 55-60%, no wall motion abnormalities, moderate mitral regurgitation, moderate tricuspid regurgitation, and severe pulmonary hypertension. Weight today 137#, which has continued to increase despite adjustments in diuretic, this is secondary to dietary indiscretions with sodium and fluid intake at home. Most meals she is eating are picked up from [employer] or [employer] by their son. Patient and family note the son is doing the best he can. Reviewed low sodium meal options that you can order.  since transitioning home from [place], with increasing lower extremity edema. Patient appears compensated and volume up on exam with persisting +2-3 lower extremity edema. Overall HF symptoms have been significantly improved with control of her atrial fibrillation with RVR which was exacerbating HF. Labs from today show stable kidney function, potassium slightly low at 3.4. Will increase diuretic regimen and potassium today.    - NYHA class III, stage C   - Fluid status : euvolemic   - Dry weight : ~ 130#   - Diuretic regimen : INCREASE furosemide to 60mg daily   - INCREASE potassium to 20meq BID   - CORE RN to call patient next week to check on weight/leg edema.  If weight remains elevated > 130# and lower extremity edema persists, will increase furosemide to 80mg daily (will also likely need to increase potassium).    - Aldosterone antagonist : none   - Blood pressure : controlled   - Reinforced HF education, reviewed low sodium diet, reviewed foods to avoid. Also reviewed low sodium meals to order.   - Prescription for compression stockings given today     2. Chronic atrial fibrillation - s/p AV solomon ablation with PPM implantation 7/5/19, stable, significant improvement in symptoms since ablation and heart rate control achieved.    - [id number] score 5 (HTN, HF, age, female)   - Continue warfarin for thromboembolic prophylaxis.     3. Moderate mitral regurgitation, moderate tricuspid regurgitation   - Will consider repeat echocardiogram in the future when patient is euvolemic or if worsening symptoms, currently won't change any management clinically.      4. Severe pulmonary hypertension - likely secondary to HFpEF and likely improved with diuresis.    - Will consider repeat echocardiogram in the future     5. Multiple myeloma with mets to bone - follows with Dr. Roberts   - Chemo therapy currently on hold, patient has follow up with Dr. Roberts in 2 weeks with labs.     7. Advance care planning - patient and family met with palliative care while hospitalized, code status changed to DNR/DNI, patient still wishing to seek restorative care. Patient and family have a reasonable understanding of her current health status. Patient has very supportive/involved family.    - Health care directive completed, electing her daughter Eufemia as POA.    - Reviewed that palliative care is available in the clinic for further support in the future. Patient and daughter note they will reach out if interested in meeting with Dr. Farris.     Changes today: INCREASE furosemide to 60mg daily, INCREASE potassium 20meq BID    Follow up plan:     Follow up with CORE in 1 month    CORE RN to call  patient and daughter (Mckenna) [phone]        History of Presenting Illness:    Amira Arreola is a very pleasant 86 year old female with a history of HFpEF, chronic atrial fibrillation, hypertension, mitral regurgitation, tricuspid regurgitation and hx of multiple myeloma mets to bone (dx 2016, currently being treated with Daratumab, Velcade, and Dexamethasone every 2 weeks).     Patient with 2 recent hospitalizations 7/3/19-7/7/19 presenting from the cardiology office with hypotension and altered mental status felt to be related to hypovolemia and hypercalcemia. Patient was in atrial fibrillation with RVR. Serial troponin negative. Unable to tolerate rate control medications due to hypotension s/p AV solomon ablation with PPM 7/5/19. Furosemide on hold during most of hospital stay due to concerns of hypovolemia, resumed at lower dosage 20mg at discharge. S/p thoracentesis 7/5 250cc removed, transudative. Palliative care met with patient/family during hospital stay, code status changed to DNR/DNI but family still wishing to receive restorative care. Discharge weight 131#. Patient discharged to [place]. 6/28/19-7/1/19 related to atrial fibrillation with RVR, acute on chronic diastolic heart failure and bilateral pleural effusions. BNP 13K on admission, 2K at time of discharge. Diltiazem increased to 180mg/day. Prior to this patient was hospitalized in March, see below for details.     Echocardiogram completed 3/23/19 LVEF 55-60%, no wall motion abnormalities, moderate mitral regurgitation, moderate tricuspid regurgitation, and severe pulmonary hypertension.     Patient was last seen in CORE clinic by me on 8/29/19 for CORE follow up, this summer patients atrial fibrillation with RVR was making management of her HF difficulty, she underwent AV noral ablation with PPM implantation and since has had significant improvement in symptoms. During CORE visit she was noted to have worsening lower extremity edema, elevated  JVD and weight had slowly been increasing since transitioning home. It was recommended that patient increase her furosemide to 40mg daily. CORE RN called the following week and patient reported no change in symptoms or weight, advised to increase furosemide to 40mg BID x 3 days, which appears was maybe done for 1 day. RN followed up with daughter who noted no worsening dyspnea or edema, advised to continue 40mg daily.     Patient is here today for CORE followup. Daughter present for clinic visit. Patient and daughter note that things are going well at home. Monitoring weights daily at home, also noting some gradual weight gain, unable to recall home weights. Patient/daughter has noted some worsening lower extremity edema. Some slight abdominal distention/bloating, but states better than in the past. Denies shortness of breath at rest. Denies exertional dyspnea with currently levels of activity. Sleeping good. Denies orthopnea or PND. Sleeping with 2 pillows. Patient denies chest pain or chest tightness. Denies dizziness, lightheadedness or other presyncopal symptoms. Denies tachycardia or palpitations. Denies falls. Denies bleeding episodes. Some complaints of chronic back pain, no changes or worsening.     Labs from today show stable kidney function, creatinine 0.94 (which is up from last month 0.72), potassium low at 3.4, otherwise electrolytes stable. Blood pressure 122/67 and HR 68.    Appetite is good. Son helps with dinner, patient notes that many items are things that are just warmed up in the oven, picking up meals from [employer] and [employer]. Not adding salt to foods. Patient reports drinking 2 cans soda, milk with meals, cup coffee in morning and also water. No tobacco or alcohol use. Using a walker for assistance at home. She had homemaker services 3 x a week for 4 hours to help with cleaning and bathing. Daughter setting up pill boxes.          Recent Hospitalizations   7/3/19-7/7/19 presenting from the  cardiology office with hypotension and altered mental status felt to be related to hypovolemia and hypercalcemia. Patient was in atrial fibrillation with RVR. Serial troponin negative. Unable to tolerate rate control medications due to hypotension s/p AV solomon ablation with PPM 7/5/19. Furosemide on hold during most of hospital stay due to concerns of hypovolemia, resumed at lower dosage 20mg at discharge. S/p thoracentesis 7/5 250cc removed, transudative. Palliative care met with patient/family during hospital stay, still wishing to receive restorative care.   6/28/19-7/1/19 related to atrial fibrillation with RVR, acute on chronic diastolic heart failure and bilateral pleural effusions. BNP 13K on admission, 2K at time of discharge. Diltiazem increased to 180mg/day.  3/22/19-3/28/19 presented with dyspnea, leg swelling and intermittent palpitations. Patient was found to have atrial fibrillation with RVR and acute diastolic heart failure exacerbation. Weight 3/24/19 147# (doesn't appear admission weight was completed). Discharge weight 135#.        Social History    , 6 children, Enjoys keeping the house clean and orderly. Retired nurse.   Social History     Socioeconomic History     Marital status:      Spouse name: Tom     Number of children: 6     Years of education: Not on file     Highest education level: Not on file   Occupational History     Occupation: rn anesthetist     Employer: RETIRED   Social Needs     Financial resource strain: Not on file     Food insecurity:     Worry: Not on file     Inability: Not on file     Transportation needs:     Medical: Not on file     Non-medical: Not on file   Tobacco Use     Smoking status: Never Smoker     Smokeless tobacco: Never Used   Substance and Sexual Activity     Alcohol use: No     Alcohol/week: 0.0 standard drinks     Drug use: No     Sexual activity: Never   Lifestyle     Physical activity:     Days per week: Not on file     Minutes per  "session: Not on file     Stress: Not on file   Relationships     Social connections:     Talks on phone: Not on file     Gets together: Not on file     Attends Church service: Not on file     Active member of club or organization: Not on file     Attends meetings of clubs or organizations: Not on file     Relationship status: Not on file     Intimate partner violence:     Fear of current or ex partner: Not on file     Emotionally abused: Not on file     Physically abused: Not on file     Forced sexual activity: Not on file   Other Topics Concern     Parent/sibling w/ CABG, MI or angioplasty before 65F 55M? Not Asked   Social History Narrative     Not on file            Review of Systems:   Skin:  Positive for bruising   Eyes:  Positive for glaucoma  ENT:  Negative    Respiratory:  Negative    Cardiovascular:    fatigue;Positive for;edema  Gastroenterology: Negative    Genitourinary:  Negative    Musculoskeletal:  Positive for arthritis  Neurologic:  Positive for local weakness;incoordination  Psychiatric:  Negative    Heme/Lymph/Imm:  Positive for allergies  Endocrine:  Negative           Physical Exam:   Vitals: /67   Pulse 68   Ht 1.651 m (5' 5\")   Wt 62.2 kg (137 lb 1.6 oz)   SpO2 100%   BMI 22.81 kg/m     Wt Readings from Last 4 Encounters:   09/30/19 62.2 kg (137 lb 1.6 oz)   08/29/19 61.2 kg (135 lb)   07/30/19 59.9 kg (132 lb)   07/23/19 59.1 kg (130 lb 4.8 oz)     GEN: frail, elderly  HEENT:  Pupils equal, round. Sclerae nonicteric.   NECK: Supple, no masses appreciated.   C/V:  Irregularly irregular rate and rhythm, no murmur, rub or gallop.   RESP: Respirations are unlabored. Clear bilaterally. Diminished in bases.   GI: Abdomen distended, nontender.  EXTREM: +2-3 bilateral lower extremity edema, pretibial to approximately 3 inches below the knee  NEURO: Alert and cooperative.  SKIN: Warm and dry.        Data:   ECHO 3/23/19  The left ventricle is normal in size.  The visual ejection fraction " is estimated at 55-60%.  No regional wall motion abnormalities noted.  There is moderate (2+) mitral regurgitation.  There is moderate (2+) tricuspid regurgitation.  Severe (>55mmHg) pulmonary hypertension is present.  The rhythm was rapid atrial fibrillation.    LIPID RESULTS:  Lab Results   Component Value Date    CHOL 160 10/12/2016    HDL 76 10/12/2016    LDL 71 10/12/2016    TRIG 66 10/12/2016    CHOLHDLRATIO 2.3 09/21/2015     LIVER ENZYME RESULTS:  Lab Results   Component Value Date    AST 20 08/07/2019    ALT 15 08/07/2019     CBC RESULTS:  Lab Results   Component Value Date    WBC 10.4 08/07/2019    RBC 3.80 08/07/2019    HGB 11.9 08/07/2019    HCT 36.8 08/07/2019    MCV 97 08/07/2019    MCH 31.3 08/07/2019    MCHC 32.3 08/07/2019    RDW 14.7 08/07/2019     08/07/2019     BMP RESULTS:  Lab Results   Component Value Date     09/30/2019    POTASSIUM 3.4 (L) 09/30/2019    CHLORIDE 103 09/30/2019    CO2 28 09/30/2019    ANIONGAP 8.4 09/30/2019     09/30/2019    BUN 13 09/30/2019    CR 0.94 09/30/2019    GFRESTIMATED 56 (L) 09/30/2019    GFRESTBLACK 68 09/30/2019    BRADLEY 9.8 09/30/2019      INR RESULTS:  Lab Results   Component Value Date    INR 1.8 (A) 09/06/2019    INR 2.2 (A) 08/30/2019            Medications     Current Outpatient Medications   Medication Sig Dispense Refill     acetaminophen (TYLENOL) 500 MG tablet Take 500 mg by mouth every 6 hours as needed for mild pain        acyclovir (ZOVIRAX) 400 MG tablet Take 1 tablet (400 mg) by mouth 2 times daily 60 tablet 3     Carboxymethylcellulose Sod PF (REFRESH PLUS) 0.5 % SOLN ophthalmic solution 1 drop 4 times daily as needed for dry eyes       dexamethasone (DECADRON) 4 MG tablet Take 20mg (5 tablets) by mouth every week. 28 tablet 3     furosemide (LASIX) 20 MG tablet Take 3 tablets (60 mg) by mouth daily 270 tablet 1     latanoprost (XALATAN) 0.005 % ophthalmic solution Place 1 drop into the right eye At Bedtime        lidocaine-prilocaine (EMLA) cream Apply to port site 1 hour prior to access 30 g 1     Multiple Vitamins-Minerals (DAILY MULTIVITAMIN) CAPS Take 1 tablet by mouth daily        oxyCODONE-acetaminophen (PERCOCET) 5-325 MG tablet Take 1 tablet by mouth every 4 hours as needed for breakthrough pain 30 tablet 0     polyethylene glycol (MIRALAX/GLYCOLAX) powder Take 17 g by mouth daily as needed for constipation        potassium chloride (KLOR-CON) 20 MEQ packet Take 20 mEq by mouth 2 times daily 180 packet 1     prochlorperazine (COMPAZINE) 10 MG tablet Take 1 tablet (10 mg) by mouth every 6 hours as needed for nausea or vomiting 30 tablet 1     SIMBRINZA 1-0.2 % ophthalmic suspension Place 1 drop into the right eye 2 times daily 1 drop AM and PM  2     Sodium Fluoride (SF 5000 PLUS) 1.1 % CREA Apply to affected area 3 times daily       triamcinolone (KENALOG) 0.1 % external cream Apply topically 2 times daily 15 g 1     warfarin (COUMADIN) 4 MG tablet Take 0.5-1 tablets (2-4 mg) by mouth daily As directed by INR clinic 90 tablet 0     warfarin (COUMADIN) 4 MG tablet Take one tablet (4 mg) by mouth on Tuesdays, Thursdays  and Saturdays; take one-half tablet ( 2 mg) on all other days of the week.            Past Medical History     Past Medical History:   Diagnosis Date     Abnormal CXR 2018    then ct done and not significant     Acute diastolic heart failure (H) 03/22/2019    nl ef, 2+mr and tr with severe pulm htn     Ascending aorta dilatation (H) 04/2016    on echo, mild, fu 7/18 4.0, slightly larger     Cancer, metastatic to bone (H)     due to myeloma     Colonic polyp 2008    adenomatous, fu 2013 tics only     Compression fracture 2016    multiple areas of spine     Dry eyes      Elevated MCV 2015    b12 and folic acid nl     HTN (hypertension) 2000    off meds for years     Lung nodule 08/2018    on ct, 4mm, ct done for fu abnl cxr     Menorrhagia 2002    hysteroscopy and d and c done     MGUS (monoclonal  gammopathy of unknown significance)     eval by Dr. Roberts     Multiple myeloma (H) 2016    dx  at [place], bone lesions seen on mri      OAB (overactive bladder)     Dr. Grullon     Osteoporosis     fu done  and stable, went off meds then, fu done ; has had gyn fu and added evista 2013 by gyn     Palpitations     nl echo, mildly dilated asc aorta     Paroxysmal atrial fibrillation (H)     had palp and ziopatch showed it, echo nl lv fxn, mild mr and tr, added coum and toprol, toprol dose raised 16     Pulmonary hypertension (H) 2019    seen on echo     Sciatica of left side     Dr. Helen Ricardo      SVT (supraventricular tachycardia) (H)     on ziopatch     Thrombocytopenia (H)      Past Surgical History:   Procedure Laterality Date     BONE MARROW BIOPSY, BONE SPECIMEN, NEEDLE/TROCAR N/A 2016    Procedure: BIOPSY BONE MARROW;  Surgeon: Bryan Patel MD;  Location:  GI     CATARACT IOL, RT/LT        SECTION  ,      COLONOSCOPY  2013    Procedure: COLONOSCOPY;  COLONOSCOPY;  Surgeon: Steffany Rockwell MD;  Location:  GI     EP ABLATION AV NODE N/A 2019    Procedure: EP Ablation AV Node;  Surgeon: Lee Menchaca MD;  Location:  HEART CARDIAC CATH LAB     EP PACEMAKER N/A 2019    Procedure: EP Pacemaker;  Surgeon: Lee Menchaca MD;  Location:  HEART CARD[facility] CATH LAB     EXCISE EXOSTOSIS TIBIA / FIBULA  2014    Procedure: EXCISE EXOSTOSIS TIBIA / FIBULA;  Surgeon: Naila Pichardo MD;  Location:  SD     hysteroscopy and d and c      due to bleeding     left anle replacement       right ankle surgery       Family History   Problem Relation Age of Onset     Heart Disease Father      C.A.D. Mother      Cerebrovascular Disease Brother      Family History Negative Sister      Family History Negative Sister      Family History Negative Brother             Allergies   Blood  transfusion related (informational only) and Penicillin [penicillins]        [facility]  Pager: [phone]

## 2019-10-01 LAB
MDC_IDC_LEAD_IMPLANT_DT: NORMAL
MDC_IDC_LEAD_LOCATION: NORMAL
MDC_IDC_LEAD_LOCATION_DETAIL_1: NORMAL
MDC_IDC_LEAD_MFG: NORMAL
MDC_IDC_LEAD_MODEL: NORMAL
MDC_IDC_LEAD_POLARITY_TYPE: NORMAL
MDC_IDC_LEAD_SERIAL: NORMAL
MDC_IDC_MSMT_BATTERY_STATUS: NORMAL
MDC_IDC_MSMT_LEADCHNL_RV_IMPEDANCE_VALUE: 764 OHM
MDC_IDC_MSMT_LEADCHNL_RV_PACING_THRESHOLD_AMPLITUDE: 0.7 V
MDC_IDC_MSMT_LEADCHNL_RV_PACING_THRESHOLD_PULSEWIDTH: 0.4 MS
MDC_IDC_MSMT_LEADCHNL_RV_SENSING_INTR_AMPL: 18.4 MV
MDC_IDC_PG_IMPLANT_DTM: NORMAL
MDC_IDC_PG_MFG: NORMAL
MDC_IDC_PG_MODEL: NORMAL
MDC_IDC_PG_SERIAL: NORMAL
MDC_IDC_PG_TYPE: NORMAL
MDC_IDC_SESS_CLINIC_NAME: NORMAL
MDC_IDC_SESS_DTM: NORMAL
MDC_IDC_SESS_TYPE: NORMAL
MDC_IDC_SET_BRADY_LOWRATE: 70 {BEATS}/MIN
MDC_IDC_SET_BRADY_MAX_SENSOR_RATE: 130 {BEATS}/MIN
MDC_IDC_SET_BRADY_MODE: NORMAL
MDC_IDC_SET_LEADCHNL_RA_SENSING_ADAPTATION_MODE: NORMAL
MDC_IDC_SET_LEADCHNL_RA_SENSING_ANODE_ELECTRODE_1: NORMAL
MDC_IDC_SET_LEADCHNL_RA_SENSING_ANODE_LOCATION_1: NORMAL
MDC_IDC_SET_LEADCHNL_RA_SENSING_CATHODE_ELECTRODE_1: NORMAL
MDC_IDC_SET_LEADCHNL_RA_SENSING_CATHODE_LOCATION_1: NORMAL
MDC_IDC_SET_LEADCHNL_RA_SENSING_POLARITY: NORMAL
MDC_IDC_SET_LEADCHNL_RA_SENSING_SENSITIVITY: 0.75 MV
MDC_IDC_SET_LEADCHNL_RV_PACING_AMPLITUDE: 1.3 V
MDC_IDC_SET_LEADCHNL_RV_PACING_ANODE_ELECTRODE_1: NORMAL
MDC_IDC_SET_LEADCHNL_RV_PACING_ANODE_LOCATION_1: NORMAL
MDC_IDC_SET_LEADCHNL_RV_PACING_CAPTURE_MODE: NORMAL
MDC_IDC_SET_LEADCHNL_RV_PACING_CATHODE_ELECTRODE_1: NORMAL
MDC_IDC_SET_LEADCHNL_RV_PACING_CATHODE_LOCATION_1: NORMAL
MDC_IDC_SET_LEADCHNL_RV_PACING_POLARITY: NORMAL
MDC_IDC_SET_LEADCHNL_RV_PACING_PULSEWIDTH: 0.4 MS
MDC_IDC_SET_LEADCHNL_RV_SENSING_ADAPTATION_MODE: NORMAL
MDC_IDC_SET_LEADCHNL_RV_SENSING_ANODE_ELECTRODE_1: NORMAL
MDC_IDC_SET_LEADCHNL_RV_SENSING_ANODE_LOCATION_1: NORMAL
MDC_IDC_SET_LEADCHNL_RV_SENSING_CATHODE_ELECTRODE_1: NORMAL
MDC_IDC_SET_LEADCHNL_RV_SENSING_CATHODE_LOCATION_1: NORMAL
MDC_IDC_SET_LEADCHNL_RV_SENSING_POLARITY: NORMAL
MDC_IDC_SET_LEADCHNL_RV_SENSING_SENSITIVITY: 2.5 MV
MDC_IDC_SET_ZONE_DETECTION_INTERVAL: 375 MS
MDC_IDC_SET_ZONE_TYPE: NORMAL
MDC_IDC_SET_ZONE_VENDOR_TYPE: NORMAL
MDC_IDC_STAT_EPISODE_RECENT_COUNT: 0
MDC_IDC_STAT_EPISODE_RECENT_COUNT_DTM_END: NORMAL
MDC_IDC_STAT_EPISODE_RECENT_COUNT_DTM_START: NORMAL
MDC_IDC_STAT_EPISODE_TOTAL_COUNT: 0
MDC_IDC_STAT_EPISODE_TOTAL_COUNT_DTM_END: NORMAL
MDC_IDC_STAT_EPISODE_TYPE: NORMAL
MDC_IDC_STAT_EPISODE_TYPE: NORMAL
MDC_IDC_STAT_EPISODE_VENDOR_TYPE: NORMAL
MDC_IDC_STAT_EPISODE_VENDOR_TYPE: NORMAL

## 2019-10-02 ENCOUNTER — TELEPHONE (OUTPATIENT)
Dept: FAMILY MEDICINE | Facility: CLINIC | Age: 84
End: 2019-10-02

## 2019-10-02 ENCOUNTER — HOSPITAL ENCOUNTER (OUTPATIENT)
Facility: CLINIC | Age: 84
Setting detail: SPECIMEN
Discharge: HOME | End: 2019-10-02
Attending: INTERNAL MEDICINE | Admitting: INTERNAL MEDICINE
Payer: MEDICARE

## 2019-10-02 ENCOUNTER — INFUSION THERAPY VISIT (OUTPATIENT)
Dept: INFUSION THERAPY | Facility: CLINIC | Age: 84
End: 2019-10-02
Attending: INTERNAL MEDICINE
Payer: MEDICARE

## 2019-10-02 DIAGNOSIS — C90.00 MULTIPLE MYELOMA NOT HAVING ACHIEVED REMISSION (H): ICD-10-CM

## 2019-10-02 DIAGNOSIS — Z79.01 LONG TERM CURRENT USE OF ANTICOAGULANT THERAPY: ICD-10-CM

## 2019-10-02 DIAGNOSIS — I48.0 PAROXYSMAL ATRIAL FIBRILLATION (H): ICD-10-CM

## 2019-10-02 LAB
ACANTHOCYTES BLD QL SMEAR: SLIGHT
ANION GAP SERPL CALCULATED.3IONS-SCNC: 4 MMOL/L (ref 3–14)
BASOPHILS # BLD AUTO: 0 10E9/L (ref 0–0.2)
BASOPHILS NFR BLD AUTO: 0.1 %
BUN SERPL-MCNC: 14 MG/DL (ref 7–30)
BURR CELLS BLD QL SMEAR: ABNORMAL
CALCIUM SERPL-MCNC: 9.3 MG/DL (ref 8.5–10.1)
CHLORIDE SERPL-SCNC: 105 MMOL/L (ref 94–109)
CO2 SERPL-SCNC: 27 MMOL/L (ref 20–32)
CREAT SERPL-MCNC: 0.74 MG/DL (ref 0.52–1.04)
DACRYOCYTES BLD QL SMEAR: SLIGHT
DIFFERENTIAL METHOD BLD: ABNORMAL
ELLIPTOCYTES BLD QL SMEAR: ABNORMAL
EOSINOPHIL # BLD AUTO: 0 10E9/L (ref 0–0.7)
EOSINOPHIL NFR BLD AUTO: 0.1 %
ERYTHROCYTE [DISTWIDTH] IN BLOOD BY AUTOMATED COUNT: 15 % (ref 10–15)
GFR SERPL CREATININE-BSD FRML MDRD: 73 ML/MIN/{1.73_M2}
GLUCOSE SERPL-MCNC: 127 MG/DL (ref 70–99)
HCT VFR BLD AUTO: 35.3 % (ref 35–47)
HGB BLD-MCNC: 11.3 G/DL (ref 11.7–15.7)
IMM GRANULOCYTES # BLD: 0 10E9/L (ref 0–0.4)
IMM GRANULOCYTES NFR BLD: 0.3 %
INR PPP: 2.51 (ref 0.86–1.14)
LYMPHOCYTES # BLD AUTO: 0.3 10E9/L (ref 0.8–5.3)
LYMPHOCYTES NFR BLD AUTO: 3.6 %
MCH RBC QN AUTO: 28.8 PG (ref 26.5–33)
MCHC RBC AUTO-ENTMCNC: 32 G/DL (ref 31.5–36.5)
MCV RBC AUTO: 90 FL (ref 78–100)
MONOCYTES # BLD AUTO: 0.1 10E9/L (ref 0–1.3)
MONOCYTES NFR BLD AUTO: 1.3 %
NEUTROPHILS # BLD AUTO: 7.4 10E9/L (ref 1.6–8.3)
NEUTROPHILS NFR BLD AUTO: 94.6 %
NRBC # BLD AUTO: 0 10*3/UL
NRBC BLD AUTO-RTO: 0 /100
OVALOCYTES BLD QL SMEAR: SLIGHT
PLATELET # BLD AUTO: 172 10E9/L (ref 150–450)
PLATELET # BLD EST: ABNORMAL 10*3/UL
POTASSIUM SERPL-SCNC: 3.7 MMOL/L (ref 3.4–5.3)
RBC # BLD AUTO: 3.92 10E12/L (ref 3.8–5.2)
SODIUM SERPL-SCNC: 136 MMOL/L (ref 133–144)
WBC # BLD AUTO: 7.8 10E9/L (ref 4–11)

## 2019-10-02 PROCEDURE — 83883 ASSAY NEPHELOMETRY NOT SPEC: CPT | Performed by: INTERNAL MEDICINE

## 2019-10-02 PROCEDURE — 84165 PROTEIN E-PHORESIS SERUM: CPT | Performed by: INTERNAL MEDICINE

## 2019-10-02 PROCEDURE — 80048 BASIC METABOLIC PNL TOTAL CA: CPT | Performed by: INTERNAL MEDICINE

## 2019-10-02 PROCEDURE — 85610 PROTHROMBIN TIME: CPT | Performed by: INTERNAL MEDICINE

## 2019-10-02 PROCEDURE — 00000402 ZZHCL STATISTIC TOTAL PROTEIN: Performed by: INTERNAL MEDICINE

## 2019-10-02 PROCEDURE — 85025 COMPLETE CBC W/AUTO DIFF WBC: CPT | Performed by: INTERNAL MEDICINE

## 2019-10-02 PROCEDURE — 36591 DRAW BLOOD OFF VENOUS DEVICE: CPT

## 2019-10-02 PROCEDURE — 82784 ASSAY IGA/IGD/IGG/IGM EACH: CPT | Performed by: INTERNAL MEDICINE

## 2019-10-02 NOTE — PROGRESS NOTES
Patient here for labs  Labs drawn:  See orders  Port R chest gauge 20 needle type gripper size 3/4  NS flush 20mls NS and 500 units heparin  Concerns no new concerns at this time  Denies concerns or issues that need to be addressed prior to appt with MD Gabriela Lee, RN

## 2019-10-02 NOTE — TELEPHONE ENCOUNTER
ANTICOAGULATION MANAGEMENT     Patient Name:  Amira Arreola  Date:  10/2/2019    ASSESSMENT /SUBJECTIVE:            Today's INR result of 2.5 is Therapeutic. Goal INR of 2.0-3.0      Warfarin dose taken: Warfarin taken as previously instructed    Diet: No new diet changes affecting INR    Medication changes/ interactions: No new medications/supplements affecting INR    Previous INR Supratherapeutic     S/S of bleeding or thromboembolism No    New injury or illness:  No    Upcoming surgery, procedure or cardioversion:  No    Additional findings: Patient will be following up with Dr. Roberts next week and will ask about INR frequency etc.    Plan    Spoke with Amira regarding INR result and instructed       Warfarin Dosing Instructions:Continue your current warfarin dose    Instructed patient to follow up no later than: 2 weeks    Education provided: Bhumi Wadelyn,  verbalizes understanding and agrees to warfarin dosing plan.    Instructed to call the Anticoagulation Clinic for any changes, questions or concerns. (#[phone])        OBJECTIVE    INR   Date Value Ref Range Status   10/02/2019 2.51 (H) 0.86 - 1.14 Final             Anticoagulation Summary  As of 10/2/2019    INR goal:   2.0-3.0   TTR:   66.7 % (3.2 y)   INR used for dosin.51 (10/2/2019)   Warfarin maintenance plan:   4 mg (4 mg x 1) every Tue, Sat; 2 mg (4 mg x 0.5) all other days   Full warfarin instructions:   4 mg every Tue, Sat; 2 mg all other days   Weekly warfarin total:   18 mg   No change documented:   Tameka Perez RN   Plan last modified:   Jessica Charlton RN (3/13/2019)   Next INR check:   10/16/2019   Priority:   INR   Target end date:   Indefinite    Indications    Long term current use of anticoagulant therapy [Z79.01]  Atrial fibrillation (H) [I48.91] (Resolved) [I48.91]             Anticoagulation Episode Summary     INR check location:       Preferred lab:   EXTERNAL LAB    Send INR reminders to:   DANTE SUMNER     Comments:    INR to be drawn at infusion visit Please notify  Zhane [phone] Patient can be reached at home phone [phone]. Do not leave dosing with spouse      Anticoagulation Care Providers     Provider Role Specialty Phone number    Addy Frias MD [facility] Internal Medicine [phone]

## 2019-10-02 NOTE — TELEPHONE ENCOUNTER
Tried calling patient but she is not home from clinic yet per spouse.  Will call patient back this afternoon.  Tameka Perez, RN  Anticoagulation Nurse - [facility], [place]

## 2019-10-03 LAB
ALBUMIN SERPL ELPH-MCNC: 3.8 G/DL (ref 3.7–5.1)
ALPHA1 GLOB SERPL ELPH-MCNC: 0.4 G/DL (ref 0.2–0.4)
ALPHA2 GLOB SERPL ELPH-MCNC: 0.8 G/DL (ref 0.5–0.9)
B-GLOBULIN SERPL ELPH-MCNC: 0.7 G/DL (ref 0.6–1)
GAMMA GLOB SERPL ELPH-MCNC: 0.2 G/DL (ref 0.7–1.6)
IGG SERPL-MCNC: 214 MG/DL (ref 695–1620)
KAPPA LC UR-MCNC: 2.81 MG/DL (ref 0.33–1.94)
KAPPA LC/LAMBDA SER: 11.71 {RATIO} (ref 0.26–1.65)
LAMBDA LC SERPL-MCNC: 0.24 MG/DL (ref 0.57–2.63)
M PROTEIN SERPL ELPH-MCNC: 0 G/DL
PROT PATTERN SERPL ELPH-IMP: ABNORMAL

## 2019-10-07 ENCOUNTER — TELEPHONE (OUTPATIENT)
Dept: CARDIOLOGY | Facility: CLINIC | Age: 84
End: 2019-10-07

## 2019-10-07 NOTE — TELEPHONE ENCOUNTER
"[facility]    Last CORE visit 9/30, no home wt, clinic wt 137#. Dry wt ~130#. Recent sodium dietary indiscretion. Lasix increased to 60mg daily, KCL increased to 20meq BID. Rx for compression stockings given.     Per Elisabeth Means CNP, \"If weight remains elevated > 130# and lower extremity edema persists, will increase furosemide to 80mg daily (will also likely need to increase potassium).\"    Called pt to review: left VM at her number.     Spoke to pt's dtr Yesi who feels like pt has had no difference to wt/sx since med changes above. BLE edema continues: using compression, though pt dislikes wearing. Breathing at baseline, has slept in chair for some time. Wt has been consistently ~134# +/- 1# in last wk. Reviewed option to increase lasix, and Yesi preferred to give it some addnl time. We agreed to speak next wk to reassess (reminder sent to RN board), or if pt's wt/sx worsen, she will call sooner.    Yesi noted that urinary incontinence is a difficulty for pt, she had it to a mild degree prior to diuretic use. We discussed:  -splitting up lasix dose, Yesi thought this med scheduling may be confusing for pt  -scheduling toileting, they will review w/ pt  -pelvic wall exercises, PT if it becomes intolerable    Future Appointments   Date Time Provider Department Center   10/9/2019 10:40 AM Shayne Roberts MD [facility]   11/4/2019  9:10 AM MA LAB [facility]   11/4/2019 10:10 AM [facility]   12/2/2019 12:00 AM MA TECH1 [facility]     Mary Galaviz RN BSN   3:59 PM 10/07/19                        "

## 2019-10-09 ENCOUNTER — ONCOLOGY VISIT (OUTPATIENT)
Dept: ONCOLOGY | Facility: CLINIC | Age: 84
End: 2019-10-09
Attending: INTERNAL MEDICINE
Payer: MEDICARE

## 2019-10-09 VITALS
BODY MASS INDEX: 22.13 KG/M2 | RESPIRATION RATE: 17 BRPM | HEART RATE: 73 BPM | TEMPERATURE: 98.2 F | OXYGEN SATURATION: 98 % | DIASTOLIC BLOOD PRESSURE: 66 MMHG | HEIGHT: 66 IN | SYSTOLIC BLOOD PRESSURE: 119 MMHG

## 2019-10-09 DIAGNOSIS — C79.51 CANCER, METASTATIC TO BONE (H): ICD-10-CM

## 2019-10-09 DIAGNOSIS — I50.9 ACUTE ON CHRONIC CONGESTIVE HEART FAILURE, UNSPECIFIED HEART FAILURE TYPE (H): ICD-10-CM

## 2019-10-09 DIAGNOSIS — C90.00 MULTIPLE MYELOMA NOT HAVING ACHIEVED REMISSION (H): Primary | ICD-10-CM

## 2019-10-09 PROCEDURE — G0463 HOSPITAL OUTPT CLINIC VISIT: HCPCS

## 2019-10-09 PROCEDURE — 99214 OFFICE O/P EST MOD 30 MIN: CPT | Performed by: INTERNAL MEDICINE

## 2019-10-09 RX ORDER — OXYCODONE AND ACETAMINOPHEN 5; 325 MG/1; MG/1
1 TABLET ORAL EVERY 12 HOURS PRN
Qty: 60 TABLET | Refills: 0 | Status: SHIPPED | OUTPATIENT
Start: 2019-10-09 | End: 2019-12-18

## 2019-10-09 RX ORDER — DEXAMETHASONE 4 MG/1
TABLET ORAL
Qty: 28 TABLET | Refills: 3 | Status: SHIPPED | OUTPATIENT
Start: 2019-10-09 | End: 2019-12-18

## 2019-10-09 RX ORDER — ACYCLOVIR 400 MG/1
400 TABLET ORAL
Qty: 60 TABLET | Refills: 3 | Status: SHIPPED | OUTPATIENT
Start: 2019-10-09 | End: 2020-03-18

## 2019-10-09 ASSESSMENT — PAIN SCALES - GENERAL: PAINLEVEL: NO PAIN (0)

## 2019-10-09 NOTE — LETTER
"    10/9/2019         RE: Amira Arreola  [address]  [place] [address]        Dear Colleague,    Thank you for referring your patient, Amira Arreola, to the [facility]. Please see a copy of my visit note below.    Oncology Rooming Note    October 9, 2019 10:43 AM   Amira Arreola is a 87 year old female who presents for:    Chief Complaint   Patient presents with     Oncology Clinic Visit     Initial Vitals: /66   Pulse 73   Temp 98.2  F (36.8  C) (Oral)   Resp 17   Ht 1.676 m (5' 6\")   SpO2 98%   BMI 22.13 kg/m    Estimated body mass index is 22.13 kg/m  as calculated from the following:    Height as of this encounter: 1.676 m (5' 6\").    Weight as of 9/30/19: 62.2 kg (137 lb 1.6 oz). Body surface area is 1.7 meters squared.  No Pain (0) Comment: Data Unavailable   No LMP recorded. Patient is postmenopausal.  Allergies reviewed: Yes  Medications reviewed: Yes    Medications: MEDICATION REFILLS NEEDED TODAY. Provider was notified.  Pharmacy name entered into [facility]:    [id number] - [facility] [address] [facility] [address]  [place] [facility] - [place], [place] - [address]    Clinical concerns:  doctor was notified.   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     1. On 09/21/2015, WBC of 4.2, hemoglobin of 13.2 and platelets of 138.    -On 09/29/2015, SPEP does not reveal any M-spike.   -On 10/02/2015, JANET does not reveal any monoclonal protein.     -On 10/22/2015, urine immunofixation reveals monoclonal free kappa light chain.    2. On 05/11/2016, kappa light chain of 50, lambda light chain of 0.32 and ratio of kappa to lambda of 156.2.  3. Bone marrow biopsy on 05/25/2016 reveals 40-50% kappa light chain restricted plasma cells.  Cytogenetics is normal. FISH " panel reveals translocation 11;14.    4. MRI of bones on 06/21/2016 and 06/22/2016 reveals myeloma lesions.  5. On 08/24/2016, she was started on revlimid with dexamethasone 20 mg weekly. She did not have any significant response to treatment.   6. Velcade and dexamethasone started on 03/21/2017.    7. Daratumumab added to velcade and dexamethasone on 05/31/2017.   -Velcade given every 14 days starting 08/01/2018.  -Treatment on hold. Last Velcade was on 06/05/2019.  Last daratumumab was on 05/22/2019. Dexamethasone continued.  8. On 05/22/2019, kappa free light chain of 1.21.      SUBJECTIVE:  Ms. Arreola is an 87-year-old female with kappa free light chain multiple myeloma.  She was on Velcade, daratumumab and dexamethasone.  We are holding it since 06/2019.  She is continuing on weekly dexamethasone.  Overall, her disease has been stable.  Her kappa free light chain remains low.  It is 2.81.  It used to be 60.      Overall, she is doing good.  No headache.  No dizziness.  No chest pain.  No shortness of breath.  No abdominal pain, nausea or vomiting.  Appetite is good.  No urinary or bowel complaints.  No bleeding.  She has fatigue.  She has chronic back pain.  No worsening of it.      PHYSICAL EXAMINATION:   Alert and oriented x 3. ECOG PS of 2.  EYES:  No icterus.   THROAT:  No ulcer or thrush.   NECK:  Supple. No lymphadenopathy.   AXILLAE:  No lymphadenopathy.   LUNGS:  Good air entry bilaterally.  No crackles or wheezing.   HEART:  Regular.  No murmur.   ABDOMEN:  Soft and nontender.  No mass.   EXTREMITIES: Bilateral pedal edema. No calf swelling or tenderness.   SKIN: No petechia.     LABORATORY DATA:  Reviewed.      ASSESSMENT:   1.  An 87-year-old female with kappa free light chain multiple myeloma which is stable.   2.  Chronic back pain.   3.  Fatigue.      PLAN:   1.  Labs were all reviewed with the patient and family.  I explained to her that her calcium, creatinine, WBC and platelet are normal.  She  is mildly anemic.  Her kappa free light chain is low at 2.81.  SPEP is normal.  At this time, her myeloma is stable.      We discussed regarding treatment.  At this time, I would recommend simply continuing weekly dexamethasone.  I told the patient when there is worsening myeloma, then I will restart her Velcade and daratumumab.  The patient agreeable for it.      Treatment was stopped, mainly because of worsening fatigue.  I am hoping that her fatigue will improve somewhat.  The patient is 87 years old.  Her fatigue might not improve a whole lot.  At least she is not getting worse.     2.  The patient wanted a refill on her Percocet which was given.  She takes it for back pain.  Side effects of Percocet discussed.     3.  I will see her in 2 months' time with labs.  Advised her to call us with any questions or concerns.         ITZ LOPEZ MD             D: 10/10/2019   T: 10/11/2019   MT: TAMMY      Name:     ALBERTO PARSON   MRN:      [id number]        Account:      [id number]   :      1932           Visit Date:   10/09/2019      Document: [id number]        Again, thank you for allowing me to participate in the care of your patient.        Sincerely,        Itz Lopez MD

## 2019-10-09 NOTE — PATIENT INSTRUCTIONS
1. Continue weekly dexamethasone.  2. Continue acyclovir.  3. Follow up in 2 months with labs. Labs to be done few days before appointment.

## 2019-10-09 NOTE — PROGRESS NOTES
"Oncology Rooming Note    October 9, 2019 10:43 AM   Amira Arreola is a 87 year old female who presents for:    Chief Complaint   Patient presents with     Oncology Clinic Visit     Initial Vitals: /66   Pulse 73   Temp 98.2  F (36.8  C) (Oral)   Resp 17   Ht 1.676 m (5' 6\")   SpO2 98%   BMI 22.13 kg/m   Estimated body mass index is 22.13 kg/m  as calculated from the following:    Height as of this encounter: 1.676 m (5' 6\").    Weight as of 9/30/19: 62.2 kg (137 lb 1.6 oz). Body surface area is 1.7 meters squared.  No Pain (0) Comment: Data Unavailable   No LMP recorded. Patient is postmenopausal.  Allergies reviewed: Yes  Medications reviewed: Yes    Medications: MEDICATION REFILLS NEEDED TODAY. Provider was notified.  Pharmacy name entered into [facility]:    [facility] #[id number] [place] [id number] AT [facility] [address]  [place] [facility] - [facility] [address]    Clinical concerns:  doctor was notified.   REFILLS ON PERCOCET, ACYCLOVIR     Libertad Pritchard, [facility]            " Received a fax from [facility] regarding cream that was ordered/ they have been trying to reach patient/ no answer

## 2019-10-11 NOTE — PROGRESS NOTES
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     1. On 09/21/2015, WBC of 4.2, hemoglobin of 13.2 and platelets of 138.    -On 09/29/2015, SPEP does not reveal any M-spike.   -On 10/02/2015, JANET does not reveal any monoclonal protein.     -On 10/22/2015, urine immunofixation reveals monoclonal free kappa light chain.    2. On 05/11/2016, kappa light chain of 50, lambda light chain of 0.32 and ratio of kappa to lambda of 156.2.  3. Bone marrow biopsy on 05/25/2016 reveals 40-50% kappa light chain restricted plasma cells.  Cytogenetics is normal. FISH panel reveals translocation 11;14.    4. MRI of bones on 06/21/2016 and 06/22/2016 reveals myeloma lesions.  5. On 08/24/2016, she was started on revlimid with dexamethasone 20 mg weekly. She did not have any significant response to treatment.   6. Velcade and dexamethasone started on 03/21/2017.    7. Daratumumab added to velcade and dexamethasone on 05/31/2017.   -Velcade given every 14 days starting 08/01/2018.  -Treatment on hold. Last Velcade was on 06/05/2019.  Last daratumumab was on 05/22/2019. Dexamethasone continued.  8. On 05/22/2019, kappa free light chain of 1.21.      SUBJECTIVE:  Ms. Arreola is an 87-year-old female with kappa free light chain multiple myeloma.  She was on Velcade, daratumumab and dexamethasone.  We are holding it since 06/2019.  She is continuing on weekly dexamethasone.  Overall, her disease has been stable.  Her kappa free light chain remains low.  It is 2.81.  It used to be 60.      Overall, she is doing good.  No headache.  No dizziness.  No chest pain.  No shortness of breath.  No abdominal pain, nausea or vomiting.  Appetite is good.  No urinary or bowel complaints.  No bleeding.  She has fatigue.  She has chronic back pain.  No worsening of it.      PHYSICAL EXAMINATION:   Alert and oriented x 3. ECOG PS of 2.  EYES:  No icterus.   THROAT:  No  ulcer or thrush.   NECK:  Supple. No lymphadenopathy.   AXILLAE:  No lymphadenopathy.   LUNGS:  Good air entry bilaterally.  No crackles or wheezing.   HEART:  Regular.  No murmur.   ABDOMEN:  Soft and nontender.  No mass.   EXTREMITIES: Bilateral pedal edema. No calf swelling or tenderness.   SKIN: No petechia.     LABORATORY DATA:  Reviewed.      ASSESSMENT:   1.  An 87-year-old female with kappa free light chain multiple myeloma which is stable.   2.  Chronic back pain.   3.  Fatigue.      PLAN:   1.  Labs were all reviewed with the patient and family.  I explained to her that her calcium, creatinine, WBC and platelet are normal.  She is mildly anemic.  Her kappa free light chain is low at 2.81.  SPEP is normal.  At this time, her myeloma is stable.      We discussed regarding treatment.  At this time, I would recommend simply continuing weekly dexamethasone.  I told the patient when there is worsening myeloma, then I will restart her Velcade and daratumumab.  The patient agreeable for it.      Treatment was stopped, mainly because of worsening fatigue.  I am hoping that her fatigue will improve somewhat.  The patient is 87 years old.  Her fatigue might not improve a whole lot.  At least she is not getting worse.     2.  The patient wanted a refill on her Percocet which was given.  She takes it for back pain.  Side effects of Percocet discussed.     3.  I will see her in 2 months' time with labs.  Advised her to call us with any questions or concerns.         ITZ LOPEZ MD             D: 10/10/2019   T: 10/11/2019   MT: TAMMY      Name:     ALBERTO PARSON   MRN:      [id number]        Account:      [id number]   :      1932           Visit Date:   10/09/2019      Document: [id number]

## 2019-10-14 NOTE — TELEPHONE ENCOUNTER
Called and spoke with daughter Mckenna about Montez's recommendations. Daughter stated she will talk to her sister Yesi about going forward with increasing her lasix dosage and will call tomorrow with what they discuss.       SHELLIE Fernandez October 14, 2019 3:06 PM

## 2019-10-14 NOTE — TELEPHONE ENCOUNTER
Called patient to follow up increased dose of Lasix 60mg daily. Patient states that she's been gaining weight, since last week. She stated that her weight was 136# today, she was unsure due to not having her weight list near her. She stated that she is definitely over 130#. She states that she hasn't had any issues with orthopnea or dyspnea with exertion/rest. She states that she's been sleeping well overnight. She does state that she still has LE swelling, even though it has decreased slightly. I had her look at her feet to see if she can see the veins in her feet, and she cannot at this time.       SHELLIE Fernandez October 14, 2019 10:18 AM

## 2019-10-14 NOTE — TELEPHONE ENCOUNTER
Reviewed phone encounter. Patient reported increasing weight and persisting lower extremity edema. Patients memory can limit her ability to provide a good history.     Patients children are very involved in her care and help with all medication management.     Can you please call and touch base with patients daughter Yesi or Mckenna? See how they feel patients symptoms and weight have been. They had been resistant to further titration in the past. Patient is frustrated with urination and incontinence.     If daughters have also noted weight gain and persisting edema would recommend increase furosemide to 80mg daily, if daughters are agreeable.       DAYSI Arellano CNP

## 2019-10-15 ENCOUNTER — PATIENT OUTREACH (OUTPATIENT)
Dept: CARE COORDINATION | Facility: CLINIC | Age: 84
End: 2019-10-15

## 2019-10-15 DIAGNOSIS — R53.81 PHYSICAL DECONDITIONING: Primary | ICD-10-CM

## 2019-10-15 ASSESSMENT — ACTIVITIES OF DAILY LIVING (ADL): DEPENDENT_IADLS:: CLEANING;COOKING;TRANSPORTATION;LAUNDRY;SHOPPING

## 2019-10-15 NOTE — PROGRESS NOTES
Clinic Care Coordination Contact    Follow Up Progress Note   [facility] admission 7/3-7/7/2019-- [facility]--    Metabolic encephalopathy, resolved    Hypercalcemia    Chronic atrial fibrillation with RVR    HTN    Chronic diastolic heart failure    Severe pulmonary HTN    Hx of SVT    Metastatic Multiple myeloma     Severe malnutrition    Deconditioning     Assessment:   Patient is no longer receiving home care services .  Patient continues home care PT exercises and building up strength     Goals addressed this encounter:   Goals Addressed    None          Intervention/Education provided during outreach: encouraged daily home exercises      Plan:     No unmet needs, no further care coordination needed at this time     [facility]     Anjali Jackson RN Care Coordinator   [facility] / [facility]   Phone: [phone]  Email :  [email or web address]

## 2019-10-16 ENCOUNTER — TELEPHONE (OUTPATIENT)
Dept: FAMILY MEDICINE | Facility: CLINIC | Age: 84
End: 2019-10-16

## 2019-10-16 DIAGNOSIS — Z79.01 LONG TERM CURRENT USE OF ANTICOAGULANT THERAPY: ICD-10-CM

## 2019-10-16 LAB — INR PPP: 2.5 (ref 0.8–1.1)

## 2019-10-16 NOTE — TELEPHONE ENCOUNTER
ANTICOAGULATION MANAGEMENT     Patient Name:  Amira Arreola  Date:  10/16/2019    ASSESSMENT /SUBJECTIVE:      Today's INR result of 2.5 is therapeutic. Goal INR of 2.0-3.0      Warfarin dose taken: Warfarin taken as previously instructed    Diet: No new diet changes affecting INR    Medication changes/ interactions: No new medications/supplements affecting INR    Previous INR: Therapeutic     S/S of bleeding or thromboembolism: No    New injury or illness:  No    Upcoming surgery, procedure or cardioversion:  No    Additional findings: none      PLAN:    Spoke with Amira regarding INR result and instructed:     Warfarin Dosing Instructions: Continue your current warfarin dose of 4 mg every Tue, Sat; 2 mg all other days    Instructed patient to follow up no later than: 3-4 weeks    Education provided: Yes: notify clinic of any changes      Amira verbalizes understanding and agrees to warfarin dosing plan.    Instructed to call the Anticoagulation Clinic for any changes, questions or concerns. (#[phone])        OBJECTIVE:  INR   Date Value Ref Range Status   10/16/2019 2.5 (A) 0.8 - 1.1 Final             Anticoagulation Summary  As of 10/16/2019    INR goal:   2.0-3.0   TTR:   67.1 % (3.2 y)   INR used for dosin.5 (10/16/2019)   Warfarin maintenance plan:   4 mg (4 mg x 1) every Tue, Sat; 2 mg (4 mg x 0.5) all other days   Full warfarin instructions:   4 mg every Tue, Sat; 2 mg all other days   Weekly warfarin total:   18 mg   Plan last modified:   Jessica Charlton RN (3/13/2019)   Next INR check:   2019   Priority:   INR   Target end date:   Indefinite    Indications    Long term current use of anticoagulant therapy [Z79.01]  Atrial fibrillation (H) [I48.91] (Resolved) [I48.91]             Anticoagulation Episode Summary     INR check location:       Preferred lab:   EXTERNAL LAB    Send INR reminders to:   DANTE SUMNER    Comments:    INR to be drawn at infusion visit Please notify   Zhane [phone] Patient can be reached at home phone [phone]. Do not leave dosing with spouse      Anticoagulation Care Providers     Provider Role Specialty Phone number    Addy Frias MD [facility] Internal Medicine [phone]

## 2019-10-19 DIAGNOSIS — Z79.01 LONG TERM CURRENT USE OF ANTICOAGULANT THERAPY: ICD-10-CM

## 2019-10-19 DIAGNOSIS — I48.0 PAROXYSMAL ATRIAL FIBRILLATION (H): ICD-10-CM

## 2019-10-22 RX ORDER — WARFARIN SODIUM 4 MG/1
TABLET ORAL
Qty: 90 TABLET | Refills: 0 | Status: SHIPPED | OUTPATIENT
Start: 2019-10-22 | End: 2020-03-15

## 2019-10-22 NOTE — TELEPHONE ENCOUNTER
Called patients daughter Yesi for update - LM for call back.  Bernarda Lira RN on 10/22/2019 at 10:10 AM

## 2019-10-23 NOTE — TELEPHONE ENCOUNTER
Call back to daughter confirming ongoing lasix dose of 60mg/d and to call [facility] w/any weight gain, sx. She verbalized understanding and had no additional questions.  Bernarda Lira RN on 10/23/2019 at 2:19 PM

## 2019-10-23 NOTE — TELEPHONE ENCOUNTER
"[facility]  HF follow up phone assessment    Recent weights:    10/23 134   10/22 135   10/21 136(resumed lasix 60mg/d)   10/20 135   10/19 137   10/18 139   10/17 137   10/16 (started lasix 80mg/d d/t increased LE swelling)   10/15 137    Current diuretic:   lasix 60mg/d - since  Monday 10/21, prior to that patient taking torsemide 80mg/d X6 days   (+KCL 20meq bid)     SOB: Patients daughter not observing any SOB/SALAZAR - she feels her breathing at baseline. She is not sleeping flat - she is propped up on several pillows. Likely this is a combination of some orthopnea as well as comfort as she has several cracked vertebra d/t the multiple myeloma. Daughter asking if she should \"test\" to see what happens if patient positioned flat - I advised against it as she is sleeping well as currently positioned  BLE Edema/ Abdominal Edema: Patient continues to have LE swelling, however her legs are at her balseine, there is no increased swelling.  Her daughter feels it is somewhat less after taking several days of lasix 80mg/d. She does not feel her abdomen significantly swollen.     Assessment and Plan:     1.  Daughter will call back w/recent weights   2.  Will then review w/Elisabeth Means for lasix dose    Future Appointments   Date Time Provider Department Center   11/4/2019  9:10 AM MA LAB [facility]   11/4/2019 10:10 AM Elisabeth Means, APRN CNP [facility] CLIN   11/13/2019 12:00 PM  [facility]   12/2/2019 12:00 AM MA TECH1 [facility] CL[facility]   12/3/2019 10:00 AM Addy Frias MD [facility]    12/11/2019 12:00 PM  [facility]   12/18/2019  2:00 PM Shayne Roberts MD [facility]   Bernarda Lira RN on 10/23/2019 at 9:50 AM      "

## 2019-10-23 NOTE — TELEPHONE ENCOUNTER
Yes, please have her continue furosemide 60mg daily and to please call [facility] clinic with any worsening symptoms or increase in weight.     Thanks,  Elisabeth

## 2019-11-01 ENCOUNTER — TELEPHONE (OUTPATIENT)
Dept: ONCOLOGY | Facility: CLINIC | Age: 84
End: 2019-11-01

## 2019-11-01 RX ORDER — HEPARIN SODIUM (PORCINE) LOCK FLUSH IV SOLN 100 UNIT/ML 100 UNIT/ML
500 SOLUTION INTRAVENOUS EVERY 8 HOURS
Status: CANCELLED
Start: 2019-11-01

## 2019-11-01 NOTE — TELEPHONE ENCOUNTER
Pt and daughter called to ask if there was any documentation of pt receiving flu shot this season. Upon review of her immunization records pt has no documented flu shot this year.   Trini Ramirez RN

## 2019-11-04 ENCOUNTER — OFFICE VISIT (OUTPATIENT)
Dept: CARDIOLOGY | Facility: CLINIC | Age: 84
End: 2019-11-04
Attending: NURSE PRACTITIONER
Payer: MEDICARE

## 2019-11-04 VITALS
SYSTOLIC BLOOD PRESSURE: 118 MMHG | HEART RATE: 70 BPM | BODY MASS INDEX: 22.11 KG/M2 | DIASTOLIC BLOOD PRESSURE: 68 MMHG | WEIGHT: 132.7 LBS | HEIGHT: 65 IN | OXYGEN SATURATION: 100 %

## 2019-11-04 DIAGNOSIS — I50.32 CHRONIC DIASTOLIC HEART FAILURE (H): ICD-10-CM

## 2019-11-04 DIAGNOSIS — I07.1 TRICUSPID VALVE INSUFFICIENCY, UNSPECIFIED ETIOLOGY: ICD-10-CM

## 2019-11-04 DIAGNOSIS — I34.0 MITRAL VALVE INSUFFICIENCY, UNSPECIFIED ETIOLOGY: ICD-10-CM

## 2019-11-04 DIAGNOSIS — I50.32 CHRONIC DIASTOLIC HEART FAILURE (H): Primary | ICD-10-CM

## 2019-11-04 DIAGNOSIS — I27.20 PULMONARY HYPERTENSION (H): ICD-10-CM

## 2019-11-04 DIAGNOSIS — I48.91 ATRIAL FIBRILLATION, UNSPECIFIED TYPE (H): ICD-10-CM

## 2019-11-04 LAB
ANION GAP SERPL CALCULATED.3IONS-SCNC: 2 MMOL/L (ref 3–14)
BUN SERPL-MCNC: 13 MG/DL (ref 7–30)
CALCIUM SERPL-MCNC: 9.3 MG/DL (ref 8.5–10.1)
CHLORIDE SERPL-SCNC: 110 MMOL/L (ref 94–109)
CO2 SERPL-SCNC: 26 MMOL/L (ref 20–32)
CREAT SERPL-MCNC: 0.74 MG/DL (ref 0.52–1.04)
GFR SERPL CREATININE-BSD FRML MDRD: 72 ML/MIN/{1.73_M2}
GLUCOSE SERPL-MCNC: 104 MG/DL (ref 70–99)
POTASSIUM SERPL-SCNC: 4.3 MMOL/L (ref 3.4–5.3)
SODIUM SERPL-SCNC: 138 MMOL/L (ref 133–144)

## 2019-11-04 PROCEDURE — 99215 OFFICE O/P EST HI 40 MIN: CPT | Performed by: NURSE PRACTITIONER

## 2019-11-04 PROCEDURE — 36415 COLL VENOUS BLD VENIPUNCTURE: CPT | Performed by: NURSE PRACTITIONER

## 2019-11-04 PROCEDURE — 80048 BASIC METABOLIC PNL TOTAL CA: CPT | Performed by: NURSE PRACTITIONER

## 2019-11-04 RX ORDER — FUROSEMIDE 20 MG
TABLET ORAL
Qty: 270 TABLET | Refills: 1 | Status: SHIPPED | OUTPATIENT
Start: 2019-11-04 | End: 2020-08-10

## 2019-11-04 ASSESSMENT — MIFFLIN-ST. JEOR: SCORE: 1037.8

## 2019-11-04 NOTE — PATIENT INSTRUCTIONS
1. Continue furosemide 60mg daily, if weight > 137 pounds take an additional 20mg (1 tablet) furosemide.   2. Continue monitoring weight daily at home  3. Continue to try limiting sodium intake  4. [facility] Follow up in 2 months with labs prior  5. Please call clinic with any additional questions/concerns [phone]

## 2019-11-04 NOTE — LETTER
11/4/2019    Addy Frias MD  [address]  [place] [address]    RE: Amira Arreola       Dear Colleague,    I had the pleasure of seeing Amira Arreola in the [facility].    Cardiology Clinic Progress Note  Amira Arreola MRN# [id number]   YOB: 1932 Age: 87 year old   Primary Cardiologist: Dr. Rivera Reason for visit: CORE follow up            Assessment and Plan:   Amira Arreola is a very pleasant 86 year old female with a history of HFpEF, chronic atrial fibrillation s/p AV solomon ablation with PPM implantation 7/5/19, hypertension, mitral regurgitation, tricuspid regurgitation and hx of multiple myeloma mets to bone (dx 2016, currently being treated with Daratumab, Velcade, and Dexamethasone every 2 weeks). Patient with 3 hospitalizations this year, see below for details. Patient here today for CORE follow up.    1.  Chronic diastolic heart failure/HFpEF - Echocardiogram completed 3/23/19 LVEF 55-60%, no wall motion abnormalities, moderate mitral regurgitation, moderate tricuspid regurgitation, and severe pulmonary hypertension. Weight today 132#, which is down 5# since last clinic visit, patient still appears slightly volume up on exam with persisting lower extremity edema otherwise HF symptoms controlled. Dietary indiscretions with sodium continue to make volume status challenging. Most meals she is eating are picked up from [employer] or [employer] by their son. Patient and family note the son is doing the best he can.  Labs from today still pending, not available for review during clinic visit. Overall patient looks good today, will continue furosemide 60mg daily (patient hesitant about increases) and will add an additional PRN dose of furosemide 20mg for weight > 137#.     - NYHA class III, stage C   - Fluid status : volume up   - Dry weight : ~ 130#   - Diuretic regimen : continue furosemide to 60mg daily, if weight is > 137# advised to take an  additional 20mg daily.    - Continue potassium to 20meq BID   - Aldosterone antagonist : none   - Blood pressure : controlled   - Reinforced HF education, reviewed low sodium diet, reviewed foods to avoid.    - Prescription for compression stockings given in the past, patient notes she will occasionally wear.     2. Chronic atrial fibrillation - s/p AV solomon ablation with PPM implantation 7/5/19, stable, significant improvement in symptoms since ablation and heart rate control achieved.    - [id number] score 5 (HTN, HF, age, female)   - Continue warfarin for thromboembolic prophylaxis.     3. Moderate mitral regurgitation, moderate tricuspid regurgitation   - Will consider repeat echocardiogram in the future when patient is euvolemic or if worsening symptoms, currently won't change any management clinically.      4. Severe pulmonary hypertension - likely secondary to HFpEF and likely improved with diuresis.    - Will consider repeat echocardiogram in the future, but is not going to change treatment currently.     5. Multiple myeloma with mets to bone - follows with Dr. Roberts   - Chemo therapy currently on hold, continue follow up with Dr. Roberts.     7. Advance care planning - patient and family met with palliative care while hospitalized, code status changed to DNR/DNI, patient still wishing to seek restorative care. Patient and family have a reasonable understanding of her current health status. Patient has very supportive/involved family.    - Health care directive completed, electing her daughter Eufemia as POA.    - Reviewed that palliative care is available in the clinic for further support in the future. Patient and daughter note they will reach out if interested in meeting with Dr. Farris.         Changes today: added PRN dose of 20mg furosemide for weight > 137#    Follow up plan:     Follow up with CORE in 2 months with labs          History of Presenting Illness:    Amira Arreola is a very pleasant 86  year old female with a history of HFpEF, chronic atrial fibrillation, hypertension, mitral regurgitation, tricuspid regurgitation and hx of multiple myeloma mets to bone (dx 2016, currently being treated with Daratumab, Velcade, and Dexamethasone every 2 weeks).     Patient with 3 hospitalizations this year, most recent hospitalizations 7/3/19-7/7/19 presenting from the cardiology office with hypotension and altered mental status felt to be related to hypovolemia and hypercalcemia. Patient was in atrial fibrillation with RVR. Serial troponin negative. Unable to tolerate rate control medications due to hypotension s/p AV solomon ablation with PPM 7/5/19. S/p thoracentesis 7/5 250cc removed, transudative. Palliative care met with patient/family during hospital stay, code status changed to DNR/DNI but family still wishing to receive restorative care. Discharge weight 131#. Patient discharged to [facility]. 6/28/19-7/1/19 related to atrial fibrillation with RVR, acute on chronic diastolic heart failure and bilateral pleural effusions. BNP 13K on admission, 2K at time of discharge. Diltiazem increased to 180mg/day. Prior to this patient was hospitalized in March, see below for details.     Echocardiogram completed 3/23/19 LVEF 55-60%, no wall motion abnormalities, moderate mitral regurgitation, moderate tricuspid regurgitation, and severe pulmonary hypertension.     Patient has bee following closely in CORE clinic for the past year, last seen 9/30/19 for CORE follow up, during our last couple CORE visits she has continued to have lower extremity edema, elevated JVD and increased weight. Adjustments in diuretic dosing have been made but patient has been resistant to this due to results of increased urination. Difficulty managing volume status is significantly impacted by sodium indiscretions, most of patients meals are take out and high sodium. Family notes this is just the way it is, patient lives with her son and he does the  best he can with meals. Most recently patient has been taking furosemide 60mg daily.     Patient is here today for CORE followup. Daughter present for clinic visit. Patient and daughter note that things are going well at home. Monitoring weights daily at home, weight has been fluctuating 132-140#, more recently 132-133#. Today at home 133#. Clinic scale shows weight is down 5# since last clinic visit. Patient states lower extremity is about the same. Denies abdominal distention/bloating. Denies shortness of breath at rest. Denies exertional dyspnea with currently levels of activity. Using walker for assistance. Sleeping good. Denies orthopnea or PND. Sleeping with 2 pillows. Patient denies chest pain or chest tightness. Denies dizziness, lightheadedness or other presyncopal symptoms. Denies tachycardia or palpitations. Denies falls. Denies bleeding episodes. Some complaints of chronic back pain, no changes or worsening.     Labs from today show still pending, not available for review during clinic visit. Blood pressure 118/68 and HR 70.    Appetite is good. Son helps with dinner, patient notes that many items are things that are just warmed up in the oven, picking up meals from [employer] and [employer]. Not adding salt to foods. Patient reports drinking 2 cans soda, milk with meals, cup coffee in morning and also water. No tobacco or alcohol use. Using a walker for assistance at home. She had homemaker services 3 x a week for 4 hours to help with cleaning and bathing. Daughter setting up pill boxes.          Recent Hospitalizations   7/3/19-7/7/19 presenting from the cardiology office with hypotension and altered mental status felt to be related to hypovolemia and hypercalcemia. Patient was in atrial fibrillation with RVR. Serial troponin negative. Unable to tolerate rate control medications due to hypotension s/p AV solomon ablation with PPM 7/5/19. Furosemide on hold during most of hospital stay due to concerns of  hypovolemia, resumed at lower dosage 20mg at discharge. S/p thoracentesis 7/5 250cc removed, transudative. Palliative care met with patient/family during hospital stay, still wishing to receive restorative care.   6/28/19-7/1/19 related to atrial fibrillation with RVR, acute on chronic diastolic heart failure and bilateral pleural effusions. BNP 13K on admission, 2K at time of discharge. Diltiazem increased to 180mg/day.  3/22/19-3/28/19 presented with dyspnea, leg swelling and intermittent palpitations. Patient was found to have atrial fibrillation with RVR and acute diastolic heart failure exacerbation. Weight 3/24/19 147# (doesn't appear admission weight was completed). Discharge weight 135#.        Social History    , 6 children, Enjoys keeping the house clean and orderly. Retired nurse.   Social History     Socioeconomic History     Marital status:      Spouse name: Tom     Number of children: 6     Years of education: Not on file     Highest education level: Not on file   Occupational History     Occupation: rn anesthetist     Employer: RETIRED   Social Needs     Financial resource strain: Not on file     Food insecurity:     Worry: Not on file     Inability: Not on file     Transportation needs:     Medical: Not on file     Non-medical: Not on file   Tobacco Use     Smoking status: Never Smoker     Smokeless tobacco: Never Used   Substance and Sexual Activity     Alcohol use: No     Alcohol/week: 0.0 standard drinks     Drug use: No     Sexual activity: Never   Lifestyle     Physical activity:     Days per week: Not on file     Minutes per session: Not on file     Stress: Not on file   Relationships     Social connections:     Talks on phone: Not on file     Gets together: Not on file     Attends Holiness service: Not on file     Active member of club or organization: Not on file     Attends meetings of clubs or organizations: Not on file     Relationship status: Not on file     Intimate  "partner violence:     Fear of current or ex partner: Not on file     Emotionally abused: Not on file     Physically abused: Not on file     Forced sexual activity: Not on file   Other Topics Concern     Parent/sibling w/ CABG, MI or angioplasty before 65F 55M? Not Asked   Social History Narrative     Not on file            Review of Systems:   Skin:  Positive for bruising   Eyes:  Positive for glaucoma  ENT:  Negative    Respiratory:  Negative    Cardiovascular:    Positive for;edema  Gastroenterology: Negative    Genitourinary:  Negative    Musculoskeletal:  Positive for arthritis  Neurologic:  Positive for local weakness;incoordination;numbness or tingling of feet  Psychiatric:  Negative    Heme/Lymph/Imm:  Positive for allergies  Endocrine:  Negative           Physical Exam:   Vitals: /68   Pulse 70   Ht 1.651 m (5' 5\")   Wt 60.2 kg (132 lb 11.2 oz)   SpO2 100%   BMI 22.08 kg/m      Wt Readings from Last 4 Encounters:   11/04/19 60.2 kg (132 lb 11.2 oz)   09/30/19 62.2 kg (137 lb 1.6 oz)   08/29/19 61.2 kg (135 lb)   07/30/19 59.9 kg (132 lb)     GEN: frail, elderly  HEENT:  Pupils equal, round. Sclerae nonicteric.   NECK: Supple, no masses appreciated.   C/V:  Irregularly irregular rate and rhythm, no murmur, rub or gallop.   RESP: Respirations are unlabored. Clear bilaterally. Diminished in bases.   GI: Abdomen distended, nontender.  EXTREM: +1-2 bilateral lower extremity edema, pretibial to approximately 4 inches below the knee  NEURO: Alert and cooperative.  SKIN: Warm and dry.        Data:   ECHO 3/23/19  The left ventricle is normal in size.  The visual ejection fraction is estimated at 55-60%.  No regional wall motion abnormalities noted.  There is moderate (2+) mitral regurgitation.  There is moderate (2+) tricuspid regurgitation.  Severe (>55mmHg) pulmonary hypertension is present.  The rhythm was rapid atrial fibrillation.    LIPID RESULTS:  Lab Results   Component Value Date    CHOL 160 " 10/12/2016    HDL 76 10/12/2016    LDL 71 10/12/2016    TRIG 66 10/12/2016    CHOLHDLRATIO 2.3 09/21/2015     LIVER ENZYME RESULTS:  Lab Results   Component Value Date    AST 20 08/07/2019    ALT 15 08/07/2019     CBC RESULTS:  Lab Results   Component Value Date    WBC 7.8 10/02/2019    RBC 3.92 10/02/2019    HGB 11.3 (L) 10/02/2019    HCT 35.3 10/02/2019    MCV 90 10/02/2019    MCH 28.8 10/02/2019    MCHC 32.0 10/02/2019    RDW 15.0 10/02/2019     10/02/2019     BMP RESULTS:  Lab Results   Component Value Date     11/04/2019    POTASSIUM 4.3 11/04/2019    CHLORIDE 110 (H) 11/04/2019    CO2 PENDING 11/04/2019    ANIONGAP PENDING 11/04/2019    GLC PENDING 11/04/2019    BUN PENDING 11/04/2019    CR PENDING 11/04/2019    GFRESTIMATED PENDING 11/04/2019    GFRESTBLACK PENDING 11/04/2019    BRADLEY PENDING 11/04/2019      INR RESULTS:  Lab Results   Component Value Date    INR 2.5 (A) 10/16/2019    INR 2.51 (H) 10/02/2019            Medications     Current Outpatient Medications   Medication Sig Dispense Refill     acetaminophen (TYLENOL) 500 MG tablet Take 500 mg by mouth every 6 hours as needed for mild pain        acyclovir (ZOVIRAX) 400 MG tablet Take 1 tablet (400 mg) by mouth 2 times daily 60 tablet 3     Carboxymethylcellulose Sod PF (REFRESH PLUS) 0.5 % SOLN ophthalmic solution 1 drop 4 times daily as needed for dry eyes       dexamethasone (DECADRON) 4 MG tablet Take 20mg (5 tablets) by mouth every week. (Patient taking differently: Take 20mg (5 tablets) by mouth on Wed) 28 tablet 3     furosemide (LASIX) 20 MG tablet Take 3 (60mg) tablets daily, if weight > 137 pounds take an additional 20mg (1 tablet) 270 tablet 1     latanoprost (XALATAN) 0.005 % ophthalmic solution Place 1 drop into the right eye At Bedtime       lidocaine-prilocaine (EMLA) cream Apply to port site 1 hour prior to access 30 g 1     Multiple Vitamins-Minerals (DAILY MULTIVITAMIN) CAPS Take 1 tablet by mouth daily         oxyCODONE-acetaminophen (PERCOCET) 5-325 MG tablet Take 1 tablet by mouth every 12 hours as needed for breakthrough pain 60 tablet 0     polyethylene glycol (MIRALAX/GLYCOLAX) powder Take 17 g by mouth daily as needed for constipation        potassium chloride (KLOR-CON) 20 MEQ packet Take 20 mEq by mouth 2 times daily 180 packet 1     prochlorperazine (COMPAZINE) 10 MG tablet Take 1 tablet (10 mg) by mouth every 6 hours as needed for nausea or vomiting 30 tablet 1     SIMBRINZA 1-0.2 % ophthalmic suspension Place 1 drop into the right eye 2 times daily 1 drop AM and PM  2     Sodium Fluoride (SF 5000 PLUS) 1.1 % CREA Apply to affected area 3 times daily       triamcinolone (KENALOG) 0.1 % external cream Apply topically 2 times daily 15 g 1     warfarin (COUMADIN) 4 MG tablet Take one tablet (4 mg) by mouth on Tuesdays, Thursdays  and Saturdays; take one-half tablet ( 2 mg) on all other days of the week.       warfarin ANTICOAGULANT (COUMADIN) 4 MG tablet Take 1 tab (4 mg) every Tue, Sat; and take 1/2 tab (2 mg) all other days of the week or as directed by your INR Clinic. 90 tablet 0          Past Medical History     Past Medical History:   Diagnosis Date     Abnormal CXR 2018    then ct done and not significant     Acute diastolic heart failure (H) 03/22/2019    nl ef, 2+mr and tr with severe pulm htn     Ascending aorta dilatation (H) 04/2016    on echo, mild, fu 7/18 4.0, slightly larger     Cancer, metastatic to bone (H)     due to myeloma     Colonic polyp 2008    adenomatous, fu 2013 tics only     Compression fracture 2016    multiple areas of spine     Dry eyes      Elevated MCV 2015    b12 and folic acid nl     HTN (hypertension) 2000    off meds for years     Lung nodule 08/2018    on ct, 4mm, ct done for fu abnl cxr     Menorrhagia 2002    hysteroscopy and d and c done     MGUS (monoclonal gammopathy of unknown significance) 2015    eval by Dr. Roberts     Multiple myeloma (H) 2016    dx 5/16 at [facility], bone  lesions seen on mri      OAB (overactive bladder)     Dr. Grullon     Osteoporosis     fu done  and stable, went off meds then, fu done ; has had gyn fu and added evista  by gyn     Palpitations     nl echo, mildly dilated asc aorta     Paroxysmal atrial fibrillation (H)     had palp and ziopatch showed it, echo nl lv fxn, mild mr and tr, added coum and toprol, toprol dose raised 16     Pulmonary hypertension (H) 2019    seen on echo     Sciatica of left side     Dr. Helen Ricardo      SVT (supraventricular tachycardia) (H)     on ziopatch     Thrombocytopenia (H)      Past Surgical History:   Procedure Laterality Date     BONE MARROW BIOPSY, BONE SPECIMEN, NEEDLE/TROCAR N/A 2016    Procedure: BIOPSY BONE MARROW;  Surgeon: Bryan Patel MD;  Location:  GI     CATARACT IOL, RT/LT        SECTION  1965, 1966     COLONOSCOPY  2013    Procedure: COLONOSCOPY;  COLONOSCOPY;  Surgeon: Steffany Rockwell MD;  Location:  GI     EP ABLATION AV NODE N/A 2019    Procedure: EP Ablation AV Node;  Surgeon: Lee Menchaca MD;  Location:  HEART CARDIAC CATH LAB     EP PACEMAKER N/A 2019    Procedure: EP Pacemaker;  Surgeon: Lee Menchaca MD;  Location:  HEART CARDIAC CATH LAB     EXCISE EXOSTOSIS TIBIA / FIBULA  2014    Procedure: EXCISE EXOSTOSIS TIBIA / FIBULA;  Surgeon: Naila Pichardo MD;  Location:  SD     hysteroscopy and d and c      due to bleeding     left anle replacement       right ankle surgery       Family History   Problem Relation Age of Onset     Heart Disease Father      C.A.D. Mother      Cerebrovascular Disease Brother      Family History Negative Sister      Family History Negative Sister      Family History Negative Brother             Allergies   Blood transfusion related (informational only) and Penicillin [penicillins]        Elisabeth Means, DAYSI CNP  [facility]  Pager: [phone]      Thank you for allowing me to participate in the care of your patient.      Sincerely,     DAYSI Arellano CNP     [facility]    cc:   DAYSI Adair CNP  [address]  [place] [address]

## 2019-11-04 NOTE — PROGRESS NOTES
Cardiology Clinic Progress Note  Amira Arreola MRN# [id number]   YOB: 1932 Age: 87 year old   Primary Cardiologist: Dr. Rivera Reason for visit: CORE follow up            Assessment and Plan:   Amira Arreola is a very pleasant 86 year old female with a history of HFpEF, chronic atrial fibrillation s/p AV solomon ablation with PPM implantation 7/5/19, hypertension, mitral regurgitation, tricuspid regurgitation and hx of multiple myeloma mets to bone (dx 2016, currently being treated with Daratumab, Velcade, and Dexamethasone every 2 weeks). Patient with 3 hospitalizations this year, see below for details. Patient here today for CORE follow up.    1.  Chronic diastolic heart failure/HFpEF - Echocardiogram completed 3/23/19 LVEF 55-60%, no wall motion abnormalities, moderate mitral regurgitation, moderate tricuspid regurgitation, and severe pulmonary hypertension. Weight today 132#, which is down 5# since last clinic visit, patient still appears slightly volume up on exam with persisting lower extremity edema otherwise HF symptoms controlled. Dietary indiscretions with sodium continue to make volume status challenging. Most meals she is eating are picked up from [employer] or [employer] by their son. Patient and family note the son is doing the best he can.  Labs from today still pending, not available for review during clinic visit. Overall patient looks good today, will continue furosemide 60mg daily (patient hesitant about increases) and will add an additional PRN dose of furosemide 20mg for weight > 137#.     - NYHA class III, stage C   - Fluid status : volume up   - Dry weight : ~ 130#   - Diuretic regimen : continue furosemide to 60mg daily, if weight is > 137# advised to take an additional 20mg daily.    - Continue potassium to 20meq BID   - Aldosterone antagonist : none   - Blood pressure : controlled   - Reinforced HF education, reviewed low sodium diet, reviewed foods to avoid.    - Prescription  for compression stockings given in the past, patient notes she will occasionally wear.     2. Chronic atrial fibrillation - s/p AV solomon ablation with PPM implantation 7/5/19, stable, significant improvement in symptoms since ablation and heart rate control achieved.    - [id number] score 5 (HTN, HF, age, female)   - Continue warfarin for thromboembolic prophylaxis.     3. Moderate mitral regurgitation, moderate tricuspid regurgitation   - Will consider repeat echocardiogram in the future when patient is euvolemic or if worsening symptoms, currently won't change any management clinically.      4. Severe pulmonary hypertension - likely secondary to HFpEF and likely improved with diuresis.    - Will consider repeat echocardiogram in the future, but is not going to change treatment currently.     5. Multiple myeloma with mets to bone - follows with Dr. Roberts   - Chemo therapy currently on hold, continue follow up with Dr. Roberts.     7. Advance care planning - patient and family met with palliative care while hospitalized, code status changed to DNR/DNI, patient still wishing to seek restorative care. Patient and family have a reasonable understanding of her current health status. Patient has very supportive/involved family.    - Health care directive completed, electing her daughter Eufemia as POA.    - Reviewed that palliative care is available in the clinic for further support in the future. Patient and daughter note they will reach out if interested in meeting with Dr. Farris.         Changes today: added PRN dose of 20mg furosemide for weight > 137#    Follow up plan:     Follow up with CORE in 2 months with labs          History of Presenting Illness:    Amira Arreola is a very pleasant 86 year old female with a history of HFpEF, chronic atrial fibrillation, hypertension, mitral regurgitation, tricuspid regurgitation and hx of multiple myeloma mets to bone (dx 2016, currently being treated with Daratumab,  Velcade, and Dexamethasone every 2 weeks).     Patient with 3 hospitalizations this year, most recent hospitalizations 7/3/19-7/7/19 presenting from the cardiology office with hypotension and altered mental status felt to be related to hypovolemia and hypercalcemia. Patient was in atrial fibrillation with RVR. Serial troponin negative. Unable to tolerate rate control medications due to hypotension s/p AV solomon ablation with PPM 7/5/19. S/p thoracentesis 7/5 250cc removed, transudative. Palliative care met with patient/family during hospital stay, code status changed to DNR/DNI but family still wishing to receive restorative care. Discharge weight 131#. Patient discharged to [facility]. 6/28/19-7/1/19 related to atrial fibrillation with RVR, acute on chronic diastolic heart failure and bilateral pleural effusions. BNP 13K on admission, 2K at time of discharge. Diltiazem increased to 180mg/day. Prior to this patient was hospitalized in March, see below for details.     Echocardiogram completed 3/23/19 LVEF 55-60%, no wall motion abnormalities, moderate mitral regurgitation, moderate tricuspid regurgitation, and severe pulmonary hypertension.     Patient has bee following closely in CORE clinic for the past year, last seen 9/30/19 for CORE follow up, during our last couple CORE visits she has continued to have lower extremity edema, elevated JVD and increased weight. Adjustments in diuretic dosing have been made but patient has been resistant to this due to results of increased urination. Difficulty managing volume status is significantly impacted by sodium indiscretions, most of patients meals are take out and high sodium. Family notes this is just the way it is, patient lives with her son and he does the best he can with meals. Most recently patient has been taking furosemide 60mg daily.     Patient is here today for CORE followup. Daughter present for clinic visit. Patient and daughter note that things are going well at  home. Monitoring weights daily at home, weight has been fluctuating 132-140#, more recently 132-133#. Today at home 133#. Clinic scale shows weight is down 5# since last clinic visit. Patient states lower extremity is about the same. Denies abdominal distention/bloating. Denies shortness of breath at rest. Denies exertional dyspnea with currently levels of activity. Using walker for assistance. Sleeping good. Denies orthopnea or PND. Sleeping with 2 pillows. Patient denies chest pain or chest tightness. Denies dizziness, lightheadedness or other presyncopal symptoms. Denies tachycardia or palpitations. Denies falls. Denies bleeding episodes. Some complaints of chronic back pain, no changes or worsening.     Labs from today show still pending, not available for review during clinic visit. Blood pressure 118/68 and HR 70.    Appetite is good. Son helps with dinner, patient notes that many items are things that are just warmed up in the oven, picking up meals from [employer] and [employer]. Not adding salt to foods. Patient reports drinking 2 cans soda, milk with meals, cup coffee in morning and also water. No tobacco or alcohol use. Using a walker for assistance at home. She had homemaker services 3 x a week for 4 hours to help with cleaning and bathing. Daughter setting up pill boxes.          Recent Hospitalizations   7/3/19-7/7/19 presenting from the cardiology office with hypotension and altered mental status felt to be related to hypovolemia and hypercalcemia. Patient was in atrial fibrillation with RVR. Serial troponin negative. Unable to tolerate rate control medications due to hypotension s/p AV solomon ablation with PPM 7/5/19. Furosemide on hold during most of hospital stay due to concerns of hypovolemia, resumed at lower dosage 20mg at discharge. S/p thoracentesis 7/5 250cc removed, transudative. Palliative care met with patient/family during hospital stay, still wishing to receive restorative care.    6/28/19-7/1/19 related to atrial fibrillation with RVR, acute on chronic diastolic heart failure and bilateral pleural effusions. BNP 13K on admission, 2K at time of discharge. Diltiazem increased to 180mg/day.  3/22/19-3/28/19 presented with dyspnea, leg swelling and intermittent palpitations. Patient was found to have atrial fibrillation with RVR and acute diastolic heart failure exacerbation. Weight 3/24/19 147# (doesn't appear admission weight was completed). Discharge weight 135#.        Social History    , 6 children, Enjoys keeping the house clean and orderly. Retired nurse.   Social History     Socioeconomic History     Marital status:      Spouse name: Tom     Number of children: 6     Years of education: Not on file     Highest education level: Not on file   Occupational History     Occupation: rn anesthetist     Employer: RETIRED   Social Needs     Financial resource strain: Not on file     Food insecurity:     Worry: Not on file     Inability: Not on file     Transportation needs:     Medical: Not on file     Non-medical: Not on file   Tobacco Use     Smoking status: Never Smoker     Smokeless tobacco: Never Used   Substance and Sexual Activity     Alcohol use: No     Alcohol/week: 0.0 standard drinks     Drug use: No     Sexual activity: Never   Lifestyle     Physical activity:     Days per week: Not on file     Minutes per session: Not on file     Stress: Not on file   Relationships     Social connections:     Talks on phone: Not on file     Gets together: Not on file     Attends Denominational service: Not on file     Active member of club or organization: Not on file     Attends meetings of clubs or organizations: Not on file     Relationship status: Not on file     Intimate partner violence:     Fear of current or ex partner: Not on file     Emotionally abused: Not on file     Physically abused: Not on file     Forced sexual activity: Not on file   Other Topics Concern      "Parent/sibling w/ CABG, MI or angioplasty before 65F 55M? Not Asked   Social History Narrative     Not on file            Review of Systems:   Skin:  Positive for bruising   Eyes:  Positive for glaucoma  ENT:  Negative    Respiratory:  Negative    Cardiovascular:    Positive for;edema  Gastroenterology: Negative    Genitourinary:  Negative    Musculoskeletal:  Positive for arthritis  Neurologic:  Positive for local weakness;incoordination;numbness or tingling of feet  Psychiatric:  Negative    Heme/Lymph/Imm:  Positive for allergies  Endocrine:  Negative           Physical Exam:   Vitals: /68   Pulse 70   Ht 1.651 m (5' 5\")   Wt 60.2 kg (132 lb 11.2 oz)   SpO2 100%   BMI 22.08 kg/m     Wt Readings from Last 4 Encounters:   11/04/19 60.2 kg (132 lb 11.2 oz)   09/30/19 62.2 kg (137 lb 1.6 oz)   08/29/19 61.2 kg (135 lb)   07/30/19 59.9 kg (132 lb)     GEN: frail, elderly  HEENT:  Pupils equal, round. Sclerae nonicteric.   NECK: Supple, no masses appreciated.   C/V:  Irregularly irregular rate and rhythm, no murmur, rub or gallop.   RESP: Respirations are unlabored. Clear bilaterally. Diminished in bases.   GI: Abdomen distended, nontender.  EXTREM: +1-2 bilateral lower extremity edema, pretibial to approximately 4 inches below the knee  NEURO: Alert and cooperative.  SKIN: Warm and dry.        Data:   ECHO 3/23/19  The left ventricle is normal in size.  The visual ejection fraction is estimated at 55-60%.  No regional wall motion abnormalities noted.  There is moderate (2+) mitral regurgitation.  There is moderate (2+) tricuspid regurgitation.  Severe (>55mmHg) pulmonary hypertension is present.  The rhythm was rapid atrial fibrillation.    LIPID RESULTS:  Lab Results   Component Value Date    CHOL 160 10/12/2016    HDL 76 10/12/2016    LDL 71 10/12/2016    TRIG 66 10/12/2016    CHOLHDLRATIO 2.3 09/21/2015     LIVER ENZYME RESULTS:  Lab Results   Component Value Date    AST 20 08/07/2019    ALT 15 " 08/07/2019     CBC RESULTS:  Lab Results   Component Value Date    WBC 7.8 10/02/2019    RBC 3.92 10/02/2019    HGB 11.3 (L) 10/02/2019    HCT 35.3 10/02/2019    MCV 90 10/02/2019    MCH 28.8 10/02/2019    MCHC 32.0 10/02/2019    RDW 15.0 10/02/2019     10/02/2019     BMP RESULTS:  Lab Results   Component Value Date     11/04/2019    POTASSIUM 4.3 11/04/2019    CHLORIDE 110 (H) 11/04/2019    CO2 PENDING 11/04/2019    ANIONGAP PENDING 11/04/2019    GLC PENDING 11/04/2019    BUN PENDING 11/04/2019    CR PENDING 11/04/2019    GFRESTIMATED PENDING 11/04/2019    GFRESTBLACK PENDING 11/04/2019    BRADLEY PENDING 11/04/2019      INR RESULTS:  Lab Results   Component Value Date    INR 2.5 (A) 10/16/2019    INR 2.51 (H) 10/02/2019            Medications     Current Outpatient Medications   Medication Sig Dispense Refill     acetaminophen (TYLENOL) 500 MG tablet Take 500 mg by mouth every 6 hours as needed for mild pain        acyclovir (ZOVIRAX) 400 MG tablet Take 1 tablet (400 mg) by mouth 2 times daily 60 tablet 3     Carboxymethylcellulose Sod PF (REFRESH PLUS) 0.5 % SOLN ophthalmic solution 1 drop 4 times daily as needed for dry eyes       dexamethasone (DECADRON) 4 MG tablet Take 20mg (5 tablets) by mouth every week. (Patient taking differently: Take 20mg (5 tablets) by mouth on Wed) 28 tablet 3     furosemide (LASIX) 20 MG tablet Take 3 (60mg) tablets daily, if weight > 137 pounds take an additional 20mg (1 tablet) 270 tablet 1     latanoprost (XALATAN) 0.005 % ophthalmic solution Place 1 drop into the right eye At Bedtime       lidocaine-prilocaine (EMLA) cream Apply to port site 1 hour prior to access 30 g 1     Multiple Vitamins-Minerals (DAILY MULTIVITAMIN) CAPS Take 1 tablet by mouth daily        oxyCODONE-acetaminophen (PERCOCET) 5-325 MG tablet Take 1 tablet by mouth every 12 hours as needed for breakthrough pain 60 tablet 0     polyethylene glycol (MIRALAX/GLYCOLAX) powder Take 17 g by mouth daily as  needed for constipation        potassium chloride (KLOR-CON) 20 MEQ packet Take 20 mEq by mouth 2 times daily 180 packet 1     prochlorperazine (COMPAZINE) 10 MG tablet Take 1 tablet (10 mg) by mouth every 6 hours as needed for nausea or vomiting 30 tablet 1     SIMBRINZA 1-0.2 % ophthalmic suspension Place 1 drop into the right eye 2 times daily 1 drop AM and PM  2     Sodium Fluoride (SF 5000 PLUS) 1.1 % CREA Apply to affected area 3 times daily       triamcinolone (KENALOG) 0.1 % external cream Apply topically 2 times daily 15 g 1     warfarin (COUMADIN) 4 MG tablet Take one tablet (4 mg) by mouth on Tuesdays, Thursdays  and Saturdays; take one-half tablet ( 2 mg) on all other days of the week.       warfarin ANTICOAGULANT (COUMADIN) 4 MG tablet Take 1 tab (4 mg) every Tue, Sat; and take 1/2 tab (2 mg) all other days of the week or as directed by your INR Clinic. 90 tablet 0          Past Medical History     Past Medical History:   Diagnosis Date     Abnormal CXR 2018    then ct done and not significant     Acute diastolic heart failure (H) 03/22/2019    nl ef, 2+mr and tr with severe pulm htn     Ascending aorta dilatation (H) 04/2016    on echo, mild, fu 7/18 4.0, slightly larger     Cancer, metastatic to bone (H)     due to myeloma     Colonic polyp 2008    adenomatous, fu 2013 tics only     Compression fracture 2016    multiple areas of spine     Dry eyes      Elevated MCV 2015    b12 and folic acid nl     HTN (hypertension) 2000    off meds for years     Lung nodule 08/2018    on ct, 4mm, ct done for fu abnl cxr     Menorrhagia 2002    hysteroscopy and d and c done     MGUS (monoclonal gammopathy of unknown significance) 2015    eval by Dr. Roberts     Multiple myeloma (H) 2016    dx 5/16 at [facility], bone lesions seen on mri 6/16     OAB (overactive bladder) 2013    Dr. Grullon     Osteoporosis     fu done 2010 and stable, went off meds then, fu done 2013; has had gyn fu and added evista 2013 by gyn      Palpitations     nl echo, mildly dilated asc aorta     Paroxysmal atrial fibrillation (H)     had palp and ziopatch showed it, echo nl lv fxn, mild mr and tr, added coum and toprol, toprol dose raised 16     Pulmonary hypertension (H) 2019    seen on echo     Sciatica of left side     Dr. Helen Ricardo      SVT (supraventricular tachycardia) (H)     on ziopatch     Thrombocytopenia (H)      Past Surgical History:   Procedure Laterality Date     BONE MARROW BIOPSY, BONE SPECIMEN, NEEDLE/TROCAR N/A 2016    Procedure: BIOPSY BONE MARROW;  Surgeon: Bryan Patel MD;  Location:  GI     CATARACT IOL, RT/LT        SECTION  ,      COLONOSCOPY  2013    Procedure: COLONOSCOPY;  COLONOSCOPY;  Surgeon: Steffany Rockwell MD;  Location:  GI     EP ABLATION AV NODE N/A 2019    Procedure: EP Ablation AV Node;  Surgeon: Lee Menchaca MD;  Location:  [facility]     EP PACEMAKER N/A 2019    Procedure: EP Pacemaker;  Surgeon: Lee Menchaca MD;  Location:  [facility]ATH [facility]     EXCISE EXOSTOSIS TIBIA / FIBULA  2014    Procedure: EXCISE EXOSTOSIS TIBIA / FIBULA;  Surgeon: Naila Pichardo MD;  Location:  SD     hysteroscopy and d and c      due to bleeding     left anle replacement       right ankle surgery       Family History   Problem Relation Age of Onset     Heart Disease Father      C.A.D. Mother      Cerebrovascular Disease Brother      Family History Negative Sister      Family History Negative Sister      Family History Negative Brother             Allergies   Blood transfusion related (informational only) and Penicillin [penicillins]        [facility] Arellano [facility]  Pager: [phone]

## 2019-11-04 NOTE — LETTER
11/4/2019    Addy Frias MD  [address]  [place] [address]    RE: Amira Arreola       Dear Colleague,    I had the pleasure of seeing Amira Arreola in the [facility].    Cardiology Clinic Progress Note  Amira Arreola MRN# [id number]   YOB: 1932 Age: 87 year old   Primary Cardiologist: Dr. Rivera Reason for visit: CORE follow up            Assessment and Plan:   Amira Arreola is a very pleasant 86 year old female with a history of HFpEF, chronic atrial fibrillation s/p AV solomon ablation with PPM implantation 7/5/19, hypertension, mitral regurgitation, tricuspid regurgitation and hx of multiple myeloma mets to bone (dx 2016, currently being treated with Daratumab, Velcade, and Dexamethasone every 2 weeks). Patient with 3 hospitalizations this year, see below for details. Patient here today for CORE follow up.    1.  Chronic diastolic heart failure/HFpEF - Echocardiogram completed 3/23/19 LVEF 55-60%, no wall motion abnormalities, moderate mitral regurgitation, moderate tricuspid regurgitation, and severe pulmonary hypertension. Weight today 132#, which is down 5# since last clinic visit, patient still appears slightly volume up on exam with persisting lower extremity edema otherwise HF symptoms controlled. Dietary indiscretions with sodium continue to make volume status challenging. Most meals she is eating are picked up from [employer] or [employer] by their son. Patient and family note the son is doing the best he can.  Labs from today still pending, not available for review during clinic visit. Overall patient looks good today, will continue furosemide 60mg daily (patient hesitant about increases) and will add an additional PRN dose of furosemide 20mg for weight > 137#.     - NYHA class III, stage C   - Fluid status : volume up   - Dry weight : ~ 130#   - Diuretic regimen : continue furosemide to 60mg daily, if weight is > 137# advised to take an  additional 20mg daily.    - Continue potassium to 20meq BID   - Aldosterone antagonist : none   - Blood pressure : controlled   - Reinforced HF education, reviewed low sodium diet, reviewed foods to avoid.    - Prescription for compression stockings given in the past, patient notes she will occasionally wear.     2. Chronic atrial fibrillation - s/p AV solomon ablation with PPM implantation 7/5/19, stable, significant improvement in symptoms since ablation and heart rate control achieved.    - [id number] score 5 (HTN, HF, age, female)   - Continue warfarin for thromboembolic prophylaxis.     3. Moderate mitral regurgitation, moderate tricuspid regurgitation   - Will consider repeat echocardiogram in the future when patient is euvolemic or if worsening symptoms, currently won't change any management clinically.      4. Severe pulmonary hypertension - likely secondary to HFpEF and likely improved with diuresis.    - Will consider repeat echocardiogram in the future, but is not going to change treatment currently.     5. Multiple myeloma with mets to bone - follows with Dr. Roberts   - Chemo therapy currently on hold, continue follow up with Dr. Roberts.     7. Advance care planning - patient and family met with palliative care while hospitalized, code status changed to DNR/DNI, patient still wishing to seek restorative care. Patient and family have a reasonable understanding of her current health status. Patient has very supportive/involved family.    - Health care directive completed, electing her daughter Eufemia as POA.    - Reviewed that palliative care is available in the clinic for further support in the future. Patient and daughter note they will reach out if interested in meeting with Dr. Farris.         Changes today: added PRN dose of 20mg furosemide for weight > 137#    Follow up plan:     Follow up with CORE in 2 months with labs          History of Presenting Illness:    Amira Arreola is a very pleasant 86  year old female with a history of HFpEF, chronic atrial fibrillation, hypertension, mitral regurgitation, tricuspid regurgitation and hx of multiple myeloma mets to bone (dx 2016, currently being treated with Daratumab, Velcade, and Dexamethasone every 2 weeks).     Patient with 3 hospitalizations this year, most recent hospitalizations 7/3/19-7/7/19 presenting from the cardiology office with hypotension and altered mental status felt to be related to hypovolemia and hypercalcemia. Patient was in atrial fibrillation with RVR. Serial troponin negative. Unable to tolerate rate control medications due to hypotension s/p AV solomon ablation with PPM 7/5/19. S/p thoracentesis 7/5 250cc removed, transudative. Palliative care met with patient/family during hospital stay, code status changed to DNR/DNI but family still wishing to receive restorative care. Discharge weight 131#. Patient discharged to [place]. 6/28/19-7/1/19 related to atrial fibrillation with RVR, acute on chronic diastolic heart failure and bilateral pleural effusions. BNP 13K on admission, 2K at time of discharge. Diltiazem increased to 180mg/day. Prior to this patient was hospitalized in March, see below for details.     Echocardiogram completed 3/23/19 LVEF 55-60%, no wall motion abnormalities, moderate mitral regurgitation, moderate tricuspid regurgitation, and severe pulmonary hypertension.     Patient has bee following closely in CORE clinic for the past year, last seen 9/30/19 for CORE follow up, during our last couple CORE visits she has continued to have lower extremity edema, elevated JVD and increased weight. Adjustments in diuretic dosing have been made but patient has been resistant to this due to results of increased urination. Difficulty managing volume status is significantly impacted by sodium indiscretions, most of patients meals are take out and high sodium. Family notes this is just the way it is, patient lives with her son and he does the  best he can with meals. Most recently patient has been taking furosemide 60mg daily.     Patient is here today for CORE followup. Daughter present for clinic visit. Patient and daughter note that things are going well at home. Monitoring weights daily at home, weight has been fluctuating 132-140#, more recently 132-133#. Today at home 133#. Clinic scale shows weight is down 5# since last clinic visit. Patient states lower extremity is about the same. Denies abdominal distention/bloating. Denies shortness of breath at rest. Denies exertional dyspnea with currently levels of activity. Using walker for assistance. Sleeping good. Denies orthopnea or PND. Sleeping with 2 pillows. Patient denies chest pain or chest tightness. Denies dizziness, lightheadedness or other presyncopal symptoms. Denies tachycardia or palpitations. Denies falls. Denies bleeding episodes. Some complaints of chronic back pain, no changes or worsening.     Labs from today show still pending, not available for review during clinic visit. Blood pressure 118/68 and HR 70.    Appetite is good. Son helps with dinner, patient notes that many items are things that are just warmed up in the oven, picking up meals from [employer] and [employer] foods. Not adding salt to foods. Patient reports drinking 2 cans soda, milk with meals, cup coffee in morning and also water. No tobacco or alcohol use. Using a walker for assistance at home. She had homemaker services 3 x a week for 4 hours to help with cleaning and bathing. Daughter setting up pill boxes.          Recent Hospitalizations   7/3/19-7/7/19 presenting from the cardiology office with hypotension and altered mental status felt to be related to hypovolemia and hypercalcemia. Patient was in atrial fibrillation with RVR. Serial troponin negative. Unable to tolerate rate control medications due to hypotension s/p AV solomon ablation with PPM 7/5/19. Furosemide on hold during most of hospital stay due to concerns of  hypovolemia, resumed at lower dosage 20mg at discharge. S/p thoracentesis 7/5 250cc removed, transudative. Palliative care met with patient/family during hospital stay, still wishing to receive restorative care.   6/28/19-7/1/19 related to atrial fibrillation with RVR, acute on chronic diastolic heart failure and bilateral pleural effusions. BNP 13K on admission, 2K at time of discharge. Diltiazem increased to 180mg/day.  3/22/19-3/28/19 presented with dyspnea, leg swelling and intermittent palpitations. Patient was found to have atrial fibrillation with RVR and acute diastolic heart failure exacerbation. Weight 3/24/19 147# (doesn't appear admission weight was completed). Discharge weight 135#.        Social History    , 6 children, Enjoys keeping the house clean and orderly. Retired nurse.   Social History     Socioeconomic History     Marital status:      Spouse name: Tom     Number of children: 6     Years of education: Not on file     Highest education level: Not on file   Occupational History     Occupation: rn anesthetist     Employer: RETIRED   Social Needs     Financial resource strain: Not on file     Food insecurity:     Worry: Not on file     Inability: Not on file     Transportation needs:     Medical: Not on file     Non-medical: Not on file   Tobacco Use     Smoking status: Never Smoker     Smokeless tobacco: Never Used   Substance and Sexual Activity     Alcohol use: No     Alcohol/week: 0.0 standard drinks     Drug use: No     Sexual activity: Never   Lifestyle     Physical activity:     Days per week: Not on file     Minutes per session: Not on file     Stress: Not on file   Relationships     Social connections:     Talks on phone: Not on file     Gets together: Not on file     Attends Yarsanism service: Not on file     Active member of club or organization: Not on file     Attends meetings of clubs or organizations: Not on file     Relationship status: Not on file     Intimate  "partner violence:     Fear of current or ex partner: Not on file     Emotionally abused: Not on file     Physically abused: Not on file     Forced sexual activity: Not on file   Other Topics Concern     Parent/sibling w/ CABG, MI or angioplasty before 65F 55M? Not Asked   Social History Narrative     Not on file            Review of Systems:   Skin:  Positive for bruising   Eyes:  Positive for glaucoma  ENT:  Negative    Respiratory:  Negative    Cardiovascular:    Positive for;edema  Gastroenterology: Negative    Genitourinary:  Negative    Musculoskeletal:  Positive for arthritis  Neurologic:  Positive for local weakness;incoordination;numbness or tingling of feet  Psychiatric:  Negative    Heme/Lymph/Imm:  Positive for allergies  Endocrine:  Negative           Physical Exam:   Vitals: /68   Pulse 70   Ht 1.651 m (5' 5\")   Wt 60.2 kg (132 lb 11.2 oz)   SpO2 100%   BMI 22.08 kg/m      Wt Readings from Last 4 Encounters:   11/04/19 60.2 kg (132 lb 11.2 oz)   09/30/19 62.2 kg (137 lb 1.6 oz)   08/29/19 61.2 kg (135 lb)   07/30/19 59.9 kg (132 lb)     GEN: frail, elderly  HEENT:  Pupils equal, round. Sclerae nonicteric.   NECK: Supple, no masses appreciated.   C/V:  Irregularly irregular rate and rhythm, no murmur, rub or gallop.   RESP: Respirations are unlabored. Clear bilaterally. Diminished in bases.   GI: Abdomen distended, nontender.  EXTREM: +1-2 bilateral lower extremity edema, pretibial to approximately 4 inches below the knee  NEURO: Alert and cooperative.  SKIN: Warm and dry.        Data:   ECHO 3/23/19  The left ventricle is normal in size.  The visual ejection fraction is estimated at 55-60%.  No regional wall motion abnormalities noted.  There is moderate (2+) mitral regurgitation.  There is moderate (2+) tricuspid regurgitation.  Severe (>55mmHg) pulmonary hypertension is present.  The rhythm was rapid atrial fibrillation.    LIPID RESULTS:  Lab Results   Component Value Date    CHOL 160 " 10/12/2016    HDL 76 10/12/2016    LDL 71 10/12/2016    TRIG 66 10/12/2016    CHOLHDLRATIO 2.3 09/21/2015     LIVER ENZYME RESULTS:  Lab Results   Component Value Date    AST 20 08/07/2019    ALT 15 08/07/2019     CBC RESULTS:  Lab Results   Component Value Date    WBC 7.8 10/02/2019    RBC 3.92 10/02/2019    HGB 11.3 (L) 10/02/2019    HCT 35.3 10/02/2019    MCV 90 10/02/2019    MCH 28.8 10/02/2019    MCHC 32.0 10/02/2019    RDW 15.0 10/02/2019     10/02/2019     BMP RESULTS:  Lab Results   Component Value Date     11/04/2019    POTASSIUM 4.3 11/04/2019    CHLORIDE 110 (H) 11/04/2019    CO2 PENDING 11/04/2019    ANIONGAP PENDING 11/04/2019    GLC PENDING 11/04/2019    BUN PENDING 11/04/2019    CR PENDING 11/04/2019    GFRESTIMATED PENDING 11/04/2019    GFRESTBLACK PENDING 11/04/2019    BRADLEY PENDING 11/04/2019      INR RESULTS:  Lab Results   Component Value Date    INR 2.5 (A) 10/16/2019    INR 2.51 (H) 10/02/2019            Medications     Current Outpatient Medications   Medication Sig Dispense Refill     acetaminophen (TYLENOL) 500 MG tablet Take 500 mg by mouth every 6 hours as needed for mild pain        acyclovir (ZOVIRAX) 400 MG tablet Take 1 tablet (400 mg) by mouth 2 times daily 60 tablet 3     Carboxymethylcellulose Sod PF (REFRESH PLUS) 0.5 % SOLN ophthalmic solution 1 drop 4 times daily as needed for dry eyes       dexamethasone (DECADRON) 4 MG tablet Take 20mg (5 tablets) by mouth every week. (Patient taking differently: Take 20mg (5 tablets) by mouth on Wed) 28 tablet 3     furosemide (LASIX) 20 MG tablet Take 3 (60mg) tablets daily, if weight > 137 pounds take an additional 20mg (1 tablet) 270 tablet 1     latanoprost (XALATAN) 0.005 % ophthalmic solution Place 1 drop into the right eye At Bedtime       lidocaine-prilocaine (EMLA) cream Apply to port site 1 hour prior to access 30 g 1     Multiple Vitamins-Minerals (DAILY MULTIVITAMIN) CAPS Take 1 tablet by mouth daily         oxyCODONE-acetaminophen (PERCOCET) 5-325 MG tablet Take 1 tablet by mouth every 12 hours as needed for breakthrough pain 60 tablet 0     polyethylene glycol (MIRALAX/GLYCOLAX) powder Take 17 g by mouth daily as needed for constipation        potassium chloride (KLOR-CON) 20 MEQ packet Take 20 mEq by mouth 2 times daily 180 packet 1     prochlorperazine (COMPAZINE) 10 MG tablet Take 1 tablet (10 mg) by mouth every 6 hours as needed for nausea or vomiting 30 tablet 1     SIMBRINZA 1-0.2 % ophthalmic suspension Place 1 drop into the right eye 2 times daily 1 drop AM and PM  2     Sodium Fluoride (SF 5000 PLUS) 1.1 % CREA Apply to affected area 3 times daily       triamcinolone (KENALOG) 0.1 % external cream Apply topically 2 times daily 15 g 1     warfarin (COUMADIN) 4 MG tablet Take one tablet (4 mg) by mouth on Tuesdays, Thursdays  and Saturdays; take one-half tablet ( 2 mg) on all other days of the week.       warfarin ANTICOAGULANT (COUMADIN) 4 MG tablet Take 1 tab (4 mg) every Tue, Sat; and take 1/2 tab (2 mg) all other days of the week or as directed by your INR Clinic. 90 tablet 0          Past Medical History     Past Medical History:   Diagnosis Date     Abnormal CXR 2018    then ct done and not significant     Acute diastolic heart failure (H) 03/22/2019    nl ef, 2+mr and tr with severe pulm htn     Ascending aorta dilatation (H) 04/2016    on echo, mild, fu 7/18 4.0, slightly larger     Cancer, metastatic to bone (H)     due to myeloma     Colonic polyp 2008    adenomatous, fu 2013 tics only     Compression fracture 2016    multiple areas of spine     Dry eyes      Elevated MCV 2015    b12 and folic acid nl     HTN (hypertension) 2000    off meds for years     Lung nodule 08/2018    on ct, 4mm, ct done for fu abnl cxr     Menorrhagia 2002    hysteroscopy and d and c done     MGUS (monoclonal gammopathy of unknown significance) 2015    eval by Dr. Roberts     Multiple myeloma (H) 2016    dx 5/16 at [place], bone  lesions seen on mri      OAB (overactive bladder)     Dr. Grullon     Osteoporosis     fu done  and stable, went off meds then, fu done ; has had gyn fu and added evista  by gyn     Palpitations     nl echo, mildly dilated asc aorta     Paroxysmal atrial fibrillation (H)     had palp and ziopatch showed it, echo nl lv fxn, mild mr and tr, added coum and toprol, toprol dose raised 16     Pulmonary hypertension (H) 2019    seen on echo     Sciatica of left side     Dr. Helen Ricardo      SVT (supraventricular tachycardia) (H)     on ziopatch     Thrombocytopenia (H)      Past Surgical History:   Procedure Laterality Date     BONE MARROW BIOPSY, BONE SPECIMEN, NEEDLE/TROCAR N/A 2016    Procedure: BIOPSY BONE MARROW;  Surgeon: Bryan Patel MD;  Location:  GI     CATARACT IOL, RT/LT        SECTION  1965, 1966     COLONOSCOPY  2013    Procedure: COLONOSCOPY;  COLONOSCOPY;  Surgeon: Steffany Rockwell MD;  Location:  GI     EP ABLATION AV NODE N/A 2019    Procedure: EP Ablation AV Node;  Surgeon: Lee Menchaca MD;  Location:  HEART CARDIAC CATH LAB     EP PACEMAKER N/A 2019    Procedure: EP Pacemaker;  Surgeon: Lee Menchaca MD;  Location:  HEART CARD[facility] CATH LAB     EXCISE EXOSTOSIS TIBIA / FIBULA  2014    Procedure: EXCISE EXOSTOSIS TIBIA / FIBULA;  Surgeon: Naila Pichardo MD;  Location:  SD     hysteroscopy and d and c      due to bleeding     left anle replacement       right ankle surgery       Family History   Problem Relation Age of Onset     Heart Disease Father      C.A.D. Mother      Cerebrovascular Disease Brother      Family History Negative Sister      Family History Negative Sister      Family History Negative Brother             Allergies   Blood transfusion related (informational only) and Penicillin [penicillins]        Elisabeth Means, DAYSI CNP  P [facility]  Pager: [phone]      Thank you for allowing me to participate in the care of your patient.    Sincerely,     DAYSI [facility]

## 2019-11-13 ENCOUNTER — INFUSION THERAPY VISIT (OUTPATIENT)
Dept: INFUSION THERAPY | Facility: CLINIC | Age: 84
End: 2019-11-13
Attending: INTERNAL MEDICINE
Payer: MEDICARE

## 2019-11-13 DIAGNOSIS — Z95.828 PORT-A-CATH IN PLACE: Primary | ICD-10-CM

## 2019-11-13 PROCEDURE — 25000128 H RX IP 250 OP 636: Performed by: INTERNAL MEDICINE

## 2019-11-13 PROCEDURE — 96523 IRRIG DRUG DELIVERY DEVICE: CPT

## 2019-11-13 RX ORDER — HEPARIN SODIUM (PORCINE) LOCK FLUSH IV SOLN 100 UNIT/ML 100 UNIT/ML
500 SOLUTION INTRAVENOUS EVERY 8 HOURS
Status: DISCONTINUED | OUTPATIENT
Start: 2019-11-13 | End: 2019-11-13 | Stop reason: HOSPADM

## 2019-11-13 RX ORDER — HEPARIN SODIUM (PORCINE) LOCK FLUSH IV SOLN 100 UNIT/ML 100 UNIT/ML
500 SOLUTION INTRAVENOUS EVERY 8 HOURS
Status: CANCELLED
Start: 2019-11-13

## 2019-11-13 RX ADMIN — HEPARIN SODIUM (PORCINE) LOCK FLUSH IV SOLN 100 UNIT/ML 500 UNITS: 100 SOLUTION at 14:58

## 2019-11-13 NOTE — PROGRESS NOTES
Nursing Note:  Amira Arreola presents today for port flush.    Patient seen by provider today: No   present during visit today: Not Applicable.    Note: N/A.    Intravenous Access:  Implanted Port.    Discharge Plan:   Patient will return in 1 month for next appointment.    Nicky Reis, RN, RN

## 2019-12-02 ENCOUNTER — ANCILLARY PROCEDURE (OUTPATIENT)
Dept: CARDIOLOGY | Facility: CLINIC | Age: 84
End: 2019-12-02
Attending: INTERNAL MEDICINE
Payer: MEDICARE

## 2019-12-02 DIAGNOSIS — I48.20 CHRONIC ATRIAL FIBRILLATION (H): ICD-10-CM

## 2019-12-02 DIAGNOSIS — Z95.0 CARDIAC PACEMAKER IN SITU: ICD-10-CM

## 2019-12-02 PROCEDURE — 93294 REM INTERROG EVL PM/LDLS PM: CPT | Performed by: INTERNAL MEDICINE

## 2019-12-02 PROCEDURE — 93296 REM INTERROG EVL PM/IDS: CPT | Performed by: INTERNAL MEDICINE

## 2019-12-06 ENCOUNTER — TRANSFERRED RECORDS (OUTPATIENT)
Dept: HEALTH INFORMATION MANAGEMENT | Facility: CLINIC | Age: 84
End: 2019-12-06

## 2019-12-06 LAB
CREAT SERPL-MCNC: 0.86 MG/DL (ref 0.51–0.95)
GFR SERPL CREATININE-BSD FRML MDRD: >60 ML/MIN/1.73ME2 (ref 60–150)
GLUCOSE SERPL-MCNC: 106 MG/DL (ref 74–100)
POTASSIUM SERPL-SCNC: 3.5 MMOL/L (ref 3.5–5.1)

## 2019-12-10 ENCOUNTER — OFFICE VISIT (OUTPATIENT)
Dept: FAMILY MEDICINE | Facility: CLINIC | Age: 84
End: 2019-12-10
Payer: MEDICARE

## 2019-12-10 VITALS
BODY MASS INDEX: 21.16 KG/M2 | TEMPERATURE: 98.9 F | DIASTOLIC BLOOD PRESSURE: 77 MMHG | SYSTOLIC BLOOD PRESSURE: 144 MMHG | HEART RATE: 72 BPM | OXYGEN SATURATION: 100 % | WEIGHT: 127 LBS | HEIGHT: 65 IN

## 2019-12-10 DIAGNOSIS — R06.2 WHEEZING: ICD-10-CM

## 2019-12-10 DIAGNOSIS — R05.9 COUGH: Primary | ICD-10-CM

## 2019-12-10 PROCEDURE — 94640 AIRWAY INHALATION TREATMENT: CPT | Performed by: INTERNAL MEDICINE

## 2019-12-10 PROCEDURE — 99213 OFFICE O/P EST LOW 20 MIN: CPT | Mod: 25 | Performed by: INTERNAL MEDICINE

## 2019-12-10 RX ORDER — ALBUTEROL SULFATE 0.83 MG/ML
2.5 SOLUTION RESPIRATORY (INHALATION) ONCE
Status: COMPLETED | OUTPATIENT
Start: 2019-12-10 | End: 2019-12-10

## 2019-12-10 RX ORDER — BENZONATATE 200 MG/1
200 CAPSULE ORAL 3 TIMES DAILY PRN
COMMUNITY
Start: 2019-12-10 | End: 2019-12-13

## 2019-12-10 RX ORDER — CODEINE PHOSPHATE AND GUAIFENESIN 10; 100 MG/5ML; MG/5ML
.5-1 SOLUTION ORAL
Qty: 100 ML | Refills: 0 | Status: SHIPPED | OUTPATIENT
Start: 2019-12-10 | End: 2020-08-14

## 2019-12-10 RX ORDER — ALBUTEROL SULFATE 0.83 MG/ML
2.5 SOLUTION RESPIRATORY (INHALATION) 4 TIMES DAILY
Qty: 1 BOX | Refills: 1 | Status: SHIPPED | OUTPATIENT
Start: 2019-12-10 | End: 2019-12-31

## 2019-12-10 RX ORDER — GUAIFENESIN 400 MG/1
400 TABLET ORAL 3 TIMES DAILY
Qty: 30 TABLET | Refills: 0 | Status: SHIPPED | OUTPATIENT
Start: 2019-12-10 | End: 2019-12-31

## 2019-12-10 RX ADMIN — ALBUTEROL SULFATE 2.5 MG: 0.83 SOLUTION RESPIRATORY (INHALATION) at 13:07

## 2019-12-10 ASSESSMENT — MIFFLIN-ST. JEOR: SCORE: 1011.95

## 2019-12-10 NOTE — PATIENT INSTRUCTIONS
Use nebulizer every 4 hours as needed for cough.    Take prescribed guaifenesin 3 times a day and guaifenesin with codeine at bedtime.    Continue benzonatate during the daytime as needed for cough.    Seek immediate medical attention if you develop fever, worsening cough, shortness of breath, chest pain, vomiting, diarrhea, increasing weakness.    Follow-up on Friday (12/13/2019).

## 2019-12-10 NOTE — PROGRESS NOTES
Subjective     Amira Arreola is a 87 year old female who presents to clinic today for the following health issues:    HPI       Hospital Follow-up Visit:    Hospital/Nursing Home/IP Rehab Facility: [facility]  Date of Admission: 12619  Date of Discharge: 12/7/19  Reason(s) for Admission: cough            Problems taking medications regularly:  None       Medication changes since discharge: None       Problems adhering to non-medication therapy:  None    Summary of hospitalization:  [facility] discharge summary reviewed  Diagnostic Tests/Treatments reviewed.  Follow up needed: none  Other Healthcare Providers Involved in Patient s Care:           Update since discharge: stable.     Post Discharge Medication Reconciliation: discharge medications reconciled and changed, per note/orders (see AVS).  Plan of care communicated with patient     Coding guidelines for this visit:  Type of Medical   Decision Making Face-to-Face Visit       within 7 Days of discharge Face-to-Face Visit        within 14 days of discharge   Moderate Complexity [id number]   High Complexity [id number]            Past Medical History:   Diagnosis Date     Abnormal CXR 2018    then ct done and not significant     Acute diastolic heart failure (H) 03/22/2019    nl ef, 2+mr and tr with severe pulm htn     Ascending aorta dilatation (H) 04/2016    on echo, mild, fu 7/18 4.0, slightly larger     Cancer, metastatic to bone (H)     due to myeloma     Colonic polyp 2008    adenomatous, fu 2013 tics only     Compression fracture 2016    multiple areas of spine     Dry eyes      Elevated MCV 2015    b12 and folic acid nl     HTN (hypertension) 2000    off meds for years     Lung nodule 08/2018    on ct, 4mm, ct done for fu abnl cxr     Menorrhagia 2002    hysteroscopy and d and c done     MGUS (monoclonal gammopathy of unknown significance) 2015    eval by Dr. Roberts     Multiple myeloma (H) 2016    dx 5/16 at [facility], bone lesions seen  "on mri 6/16     OAB (overactive bladder) 2013    Dr. Grullon     Osteoporosis     fu done 2010 and stable, went off meds then, fu done 2013; has had gyn fu and added evista 2013 by gyn     Palpitations 4/16    nl echo, mildly dilated asc aorta     Paroxysmal atrial fibrillation (H) 4/16    had palp and ziopatch showed it, echo nl lv fxn, mild mr and tr, added coum and toprol, toprol dose raised 12/22/16     Pulmonary hypertension (H) 03/2019    seen on echo     Sciatica of left side 12/13    Dr. Helen Ricardo 2004     SVT (supraventricular tachycardia) (H) 4/16    on ziopatch     Thrombocytopenia (H) 2014       Review of Systems   Constitutional: Negative for fatigue.   Eyes: Negative for visual disturbance.   Respiratory: Negative for shortness of breath.    Cardiovascular: Negative for chest pain, palpitations and leg swelling.   Gastrointestinal: Negative for abdominal pain, nausea and vomiting.   Neurological: Negative for dizziness, weakness, light-headedness, numbness and headaches.       BP (!) 144/77 (BP Location: Right arm, Patient Position: Chair, Cuff Size: Adult Regular)   Pulse 72   Temp 98.9  F (37.2  C) (Tympanic)   Ht 1.651 m (5' 5\")   Wt 57.6 kg (127 lb)   SpO2 100%   BMI 21.13 kg/m      Physical Exam  Vitals signs and nursing note reviewed.   Constitutional:       General: She is not in acute distress.  Eyes:      Pupils: Pupils are equal, round, and reactive to light.   Neck:      Thyroid: No thyromegaly.   Cardiovascular:      Rate and Rhythm: Normal rate and regular rhythm.      Heart sounds: Normal heart sounds.   Pulmonary:      Effort: Pulmonary effort is normal. No respiratory distress.      Breath sounds: Normal breath sounds.   Neurological:      Mental Status: She is alert and oriented to person, place, and time.      Coordination: Coordination normal.           ICD-10-CM    1. Cough R05 albuterol (PROVENTIL) neb solution 2.5 mg     guaiFENesin-codeine (ROBITUSSIN AC) " 100-10 MG/5ML solution     guaiFENesin 400 MG TABS   2. Wheezing R06.2 albuterol (PROVENTIL) (2.5 MG/3ML) 0.083% neb solution     order for DME       Patient Instructions   Use nebulizer every 4 hours as needed for cough.    Take prescribed guaifenesin 3 times a day and guaifenesin with codeine at bedtime.    Continue benzonatate during the daytime as needed for cough.    Seek immediate medical attention if you develop fever, worsening cough, shortness of breath, chest pain, vomiting, diarrhea, increasing weakness.    Follow-up on Friday (12/13/2019).

## 2019-12-11 ENCOUNTER — INFUSION THERAPY VISIT (OUTPATIENT)
Dept: INFUSION THERAPY | Facility: CLINIC | Age: 84
End: 2019-12-11
Attending: INTERNAL MEDICINE
Payer: MEDICARE

## 2019-12-11 ENCOUNTER — HOSPITAL ENCOUNTER (OUTPATIENT)
Facility: CLINIC | Age: 84
Setting detail: SPECIMEN
Discharge: HOME | End: 2019-12-11
Attending: INTERNAL MEDICINE | Admitting: INTERNAL MEDICINE
Payer: MEDICARE

## 2019-12-11 DIAGNOSIS — C90.00 MULTIPLE MYELOMA NOT HAVING ACHIEVED REMISSION (H): Primary | ICD-10-CM

## 2019-12-11 DIAGNOSIS — Z95.828 PORT-A-CATH IN PLACE: ICD-10-CM

## 2019-12-11 LAB
ANION GAP SERPL CALCULATED.3IONS-SCNC: 6 MMOL/L (ref 3–14)
BUN SERPL-MCNC: 18 MG/DL (ref 7–30)
CALCIUM SERPL-MCNC: 9.1 MG/DL (ref 8.5–10.1)
CHLORIDE SERPL-SCNC: 108 MMOL/L (ref 94–109)
CO2 SERPL-SCNC: 26 MMOL/L (ref 20–32)
CREAT SERPL-MCNC: 0.75 MG/DL (ref 0.52–1.04)
ERYTHROCYTE [DISTWIDTH] IN BLOOD BY AUTOMATED COUNT: 16.9 % (ref 10–15)
GFR SERPL CREATININE-BSD FRML MDRD: 71 ML/MIN/{1.73_M2}
GLUCOSE SERPL-MCNC: 129 MG/DL (ref 70–99)
HCT VFR BLD AUTO: 35.8 % (ref 35–47)
HGB BLD-MCNC: 11.5 G/DL (ref 11.7–15.7)
MCH RBC QN AUTO: 27.8 PG (ref 26.5–33)
MCHC RBC AUTO-ENTMCNC: 32.1 G/DL (ref 31.5–36.5)
MCV RBC AUTO: 87 FL (ref 78–100)
PLATELET # BLD AUTO: 191 10E9/L (ref 150–450)
POTASSIUM SERPL-SCNC: 3.6 MMOL/L (ref 3.4–5.3)
RBC # BLD AUTO: 4.13 10E12/L (ref 3.8–5.2)
SODIUM SERPL-SCNC: 140 MMOL/L (ref 133–144)
WBC # BLD AUTO: 6.5 10E9/L (ref 4–11)

## 2019-12-11 PROCEDURE — 25000128 H RX IP 250 OP 636: Performed by: INTERNAL MEDICINE

## 2019-12-11 PROCEDURE — 83883 ASSAY NEPHELOMETRY NOT SPEC: CPT | Performed by: INTERNAL MEDICINE

## 2019-12-11 PROCEDURE — 80048 BASIC METABOLIC PNL TOTAL CA: CPT | Performed by: INTERNAL MEDICINE

## 2019-12-11 PROCEDURE — 36591 DRAW BLOOD OFF VENOUS DEVICE: CPT

## 2019-12-11 PROCEDURE — 85027 COMPLETE CBC AUTOMATED: CPT | Performed by: INTERNAL MEDICINE

## 2019-12-11 PROCEDURE — 00000402 ZZHCL STATISTIC TOTAL PROTEIN: Performed by: INTERNAL MEDICINE

## 2019-12-11 PROCEDURE — 84165 PROTEIN E-PHORESIS SERUM: CPT | Performed by: INTERNAL MEDICINE

## 2019-12-11 RX ORDER — HEPARIN SODIUM (PORCINE) LOCK FLUSH IV SOLN 100 UNIT/ML 100 UNIT/ML
500 SOLUTION INTRAVENOUS EVERY 8 HOURS
Status: CANCELLED
Start: 2019-12-11

## 2019-12-11 RX ORDER — HEPARIN SODIUM (PORCINE) LOCK FLUSH IV SOLN 100 UNIT/ML 100 UNIT/ML
500 SOLUTION INTRAVENOUS EVERY 8 HOURS
Status: DISCONTINUED | OUTPATIENT
Start: 2019-12-11 | End: 2019-12-11 | Stop reason: HOSPADM

## 2019-12-11 RX ADMIN — HEPARIN SODIUM (PORCINE) LOCK FLUSH IV SOLN 100 UNIT/ML 500 UNITS: 100 SOLUTION at 12:23

## 2019-12-11 NOTE — PROGRESS NOTES
Nursing Note:  Amira Arreola presents today for port labs.    Patient seen by provider today: No   present during visit today: Not Applicable.    Note: N/A.    Intravenous Access:  Labs drawn without difficulty.  Implanted Port.    Discharge Plan:   Patient will return next week for MD appt.    Nicky Reis, RN, RN

## 2019-12-12 LAB
ALBUMIN SERPL ELPH-MCNC: 3.6 G/DL (ref 3.7–5.1)
ALPHA1 GLOB SERPL ELPH-MCNC: 0.4 G/DL (ref 0.2–0.4)
ALPHA2 GLOB SERPL ELPH-MCNC: 0.9 G/DL (ref 0.5–0.9)
B-GLOBULIN SERPL ELPH-MCNC: 0.7 G/DL (ref 0.6–1)
GAMMA GLOB SERPL ELPH-MCNC: 0.2 G/DL (ref 0.7–1.6)
KAPPA LC UR-MCNC: 3.3 MG/DL (ref 0.33–1.94)
KAPPA LC/LAMBDA SER: 5.79 {RATIO} (ref 0.26–1.65)
LAMBDA LC SERPL-MCNC: 0.57 MG/DL (ref 0.57–2.63)
M PROTEIN SERPL ELPH-MCNC: 0 G/DL
PROT PATTERN SERPL ELPH-IMP: ABNORMAL

## 2019-12-12 ASSESSMENT — ENCOUNTER SYMPTOMS
NUMBNESS: 0
SHORTNESS OF BREATH: 0
ABDOMINAL PAIN: 0
DIZZINESS: 0
LIGHT-HEADEDNESS: 0
PALPITATIONS: 0
FATIGUE: 0
VOMITING: 0
WEAKNESS: 0
NAUSEA: 0
HEADACHES: 0

## 2019-12-13 ENCOUNTER — OFFICE VISIT (OUTPATIENT)
Dept: FAMILY MEDICINE | Facility: CLINIC | Age: 84
End: 2019-12-13
Payer: MEDICARE

## 2019-12-13 VITALS
BODY MASS INDEX: 21.31 KG/M2 | OXYGEN SATURATION: 99 % | DIASTOLIC BLOOD PRESSURE: 74 MMHG | HEIGHT: 65 IN | TEMPERATURE: 97.6 F | SYSTOLIC BLOOD PRESSURE: 147 MMHG | WEIGHT: 127.9 LBS | HEART RATE: 72 BPM

## 2019-12-13 DIAGNOSIS — R05.9 COUGH: Primary | ICD-10-CM

## 2019-12-13 PROCEDURE — 99213 OFFICE O/P EST LOW 20 MIN: CPT | Performed by: INTERNAL MEDICINE

## 2019-12-13 RX ORDER — BENZONATATE 200 MG/1
200 CAPSULE ORAL 3 TIMES DAILY PRN
Qty: 30 CAPSULE | Refills: 1 | Status: SHIPPED | OUTPATIENT
Start: 2019-12-13 | End: 2019-12-31

## 2019-12-13 ASSESSMENT — MIFFLIN-ST. JEOR: SCORE: 1016.03

## 2019-12-13 NOTE — PROGRESS NOTES
"Subjective     Amira Arreola is a 87 year old female who presents to clinic today for the following health issues:    HPI   Follow up      Past Medical History:   Diagnosis Date     Abnormal CXR 2018    then ct done and not significant     Acute diastolic heart failure (H) 03/22/2019    nl ef, 2+mr and tr with severe pulm htn     Ascending aorta dilatation (H) 04/2016    on echo, mild, fu 7/18 4.0, slightly larger     Cancer, metastatic to bone (H)     due to myeloma     Colonic polyp 2008    adenomatous, fu 2013 tics only     Compression fracture 2016    multiple areas of spine     Dry eyes      Elevated MCV 2015    b12 and folic acid nl     HTN (hypertension) 2000    off meds for years     Lung nodule 08/2018    on ct, 4mm, ct done for fu abnl cxr     Menorrhagia 2002    hysteroscopy and d and c done     MGUS (monoclonal gammopathy of unknown significance) 2015    eval by Dr. Roberts     Multiple myeloma (H) 2016    dx 5/16 at [facility], bone lesions seen on mri 6/16     OAB (overactive bladder) 2013    Dr. Grullon     Osteoporosis     fu done 2010 and stable, went off meds then, fu done 2013; has had gyn fu and added evista 2013 by gyn     Palpitations 4/16    nl echo, mildly dilated asc aorta     Paroxysmal atrial fibrillation (H) 4/16    had palp and ziopatch showed it, echo nl lv fxn, mild mr and tr, added coum and toprol, toprol dose raised 12/22/16     Pulmonary hypertension (H) 03/2019    seen on echo     Sciatica of left side 12/13    Dr. Helen Ricardo 2004     SVT (supraventricular tachycardia) (H) 4/16    on ziopatch     Thrombocytopenia (H) 2014       Review of Systems    BP (!) 147/74 (BP Location: Right arm, Patient Position: Sitting, Cuff Size: Adult Small)   Pulse 72   Temp 97.6  F (36.4  C) (Tympanic)   Ht 1.651 m (5' 5\")   Wt 58 kg (127 lb 14.4 oz)   SpO2 99%   BMI 21.28 kg/m      Physical Exam      ICD-10-CM    1. Cough R05 benzonatate (TESSALON) 200 MG capsule       Patient " Instructions   Follow up if your cough worsens, or if you develop new symptoms or side effects from the medication/s.

## 2019-12-13 NOTE — PATIENT INSTRUCTIONS
Follow up if your cough worsens, or if you develop new symptoms or side effects from the medication/s.

## 2019-12-16 ENCOUNTER — TELEPHONE (OUTPATIENT)
Dept: FAMILY MEDICINE | Facility: CLINIC | Age: 84
End: 2019-12-16

## 2019-12-16 NOTE — TELEPHONE ENCOUNTER
Spoke with Amira who states her daughter is working on getting home care setup for patient and  and to outreach to daughter.  Spoke to Yesi who confirms Home Care will be seeing patient this week for intake and INR.  Tameka Perez, RN  Anticoagulation Nurse - [facility]

## 2019-12-17 LAB
MDC_IDC_EPISODE_DTM: NORMAL
MDC_IDC_EPISODE_ID: NORMAL
MDC_IDC_EPISODE_TYPE: NORMAL
MDC_IDC_LEAD_IMPLANT_DT: NORMAL
MDC_IDC_LEAD_LOCATION: NORMAL
MDC_IDC_LEAD_LOCATION_DETAIL_1: NORMAL
MDC_IDC_LEAD_MFG: NORMAL
MDC_IDC_LEAD_MODEL: NORMAL
MDC_IDC_LEAD_POLARITY_TYPE: NORMAL
MDC_IDC_LEAD_SERIAL: NORMAL
MDC_IDC_MSMT_BATTERY_DTM: NORMAL
MDC_IDC_MSMT_BATTERY_REMAINING_LONGEVITY: 114 MO
MDC_IDC_MSMT_BATTERY_REMAINING_PERCENTAGE: 100 %
MDC_IDC_MSMT_BATTERY_STATUS: NORMAL
MDC_IDC_MSMT_LEADCHNL_RV_IMPEDANCE_VALUE: 741 OHM
MDC_IDC_MSMT_LEADCHNL_RV_PACING_THRESHOLD_AMPLITUDE: 0.9 V
MDC_IDC_MSMT_LEADCHNL_RV_PACING_THRESHOLD_PULSEWIDTH: 0.4 MS
MDC_IDC_PG_IMPLANT_DTM: NORMAL
MDC_IDC_PG_MFG: NORMAL
MDC_IDC_PG_MODEL: NORMAL
MDC_IDC_PG_SERIAL: NORMAL
MDC_IDC_PG_TYPE: NORMAL
MDC_IDC_SESS_CLINIC_NAME: NORMAL
MDC_IDC_SESS_DTM: NORMAL
MDC_IDC_SESS_TYPE: NORMAL
MDC_IDC_SET_BRADY_LOWRATE: 70 {BEATS}/MIN
MDC_IDC_SET_BRADY_MAX_SENSOR_RATE: 130 {BEATS}/MIN
MDC_IDC_SET_BRADY_MODE: NORMAL
MDC_IDC_SET_LEADCHNL_RV_PACING_AMPLITUDE: 1.5 V
MDC_IDC_SET_LEADCHNL_RV_PACING_CAPTURE_MODE: NORMAL
MDC_IDC_SET_LEADCHNL_RV_PACING_POLARITY: NORMAL
MDC_IDC_SET_LEADCHNL_RV_PACING_PULSEWIDTH: 0.4 MS
MDC_IDC_SET_LEADCHNL_RV_SENSING_ADAPTATION_MODE: NORMAL
MDC_IDC_SET_LEADCHNL_RV_SENSING_POLARITY: NORMAL
MDC_IDC_SET_LEADCHNL_RV_SENSING_SENSITIVITY: 2.5 MV
MDC_IDC_SET_ZONE_DETECTION_INTERVAL: 375 MS
MDC_IDC_SET_ZONE_TYPE: NORMAL
MDC_IDC_SET_ZONE_VENDOR_TYPE: NORMAL
MDC_IDC_STAT_BRADY_DTM_END: NORMAL
MDC_IDC_STAT_BRADY_DTM_START: NORMAL
MDC_IDC_STAT_BRADY_RV_PERCENT_PACED: 97 %
MDC_IDC_STAT_EPISODE_RECENT_COUNT: 0
MDC_IDC_STAT_EPISODE_RECENT_COUNT_DTM_END: NORMAL
MDC_IDC_STAT_EPISODE_RECENT_COUNT_DTM_START: NORMAL
MDC_IDC_STAT_EPISODE_TYPE: NORMAL
MDC_IDC_STAT_EPISODE_VENDOR_TYPE: NORMAL
MDC_IDC_STAT_EPISODE_VENDOR_TYPE: NORMAL

## 2019-12-18 ENCOUNTER — TELEPHONE (OUTPATIENT)
Dept: FAMILY MEDICINE | Facility: CLINIC | Age: 84
End: 2019-12-18

## 2019-12-18 ENCOUNTER — ONCOLOGY VISIT (OUTPATIENT)
Dept: ONCOLOGY | Facility: CLINIC | Age: 84
End: 2019-12-18
Attending: INTERNAL MEDICINE
Payer: MEDICARE

## 2019-12-18 VITALS
SYSTOLIC BLOOD PRESSURE: 130 MMHG | DIASTOLIC BLOOD PRESSURE: 71 MMHG | RESPIRATION RATE: 16 BRPM | HEART RATE: 73 BPM | OXYGEN SATURATION: 100 % | HEIGHT: 65 IN | BODY MASS INDEX: 21.16 KG/M2 | WEIGHT: 127 LBS

## 2019-12-18 DIAGNOSIS — Z79.01 LONG TERM CURRENT USE OF ANTICOAGULANT THERAPY: ICD-10-CM

## 2019-12-18 DIAGNOSIS — C90.00 MULTIPLE MYELOMA NOT HAVING ACHIEVED REMISSION (H): Primary | ICD-10-CM

## 2019-12-18 DIAGNOSIS — G89.3 CANCER ASSOCIATED PAIN: ICD-10-CM

## 2019-12-18 LAB — INR PPP: 2.7 (ref 0.9–1.1)

## 2019-12-18 PROCEDURE — G0463 HOSPITAL OUTPT CLINIC VISIT: HCPCS

## 2019-12-18 PROCEDURE — 99214 OFFICE O/P EST MOD 30 MIN: CPT | Performed by: INTERNAL MEDICINE

## 2019-12-18 RX ORDER — DEXAMETHASONE 4 MG/1
TABLET ORAL
Qty: 28 TABLET | Refills: 3 | Status: SHIPPED | OUTPATIENT
Start: 2019-12-18 | End: 2020-06-29

## 2019-12-18 RX ORDER — OXYCODONE HYDROCHLORIDE 5 MG/1
TABLET ORAL
Qty: 30 TABLET | Refills: 0 | Status: SHIPPED | OUTPATIENT
Start: 2019-12-18 | End: 2022-01-01

## 2019-12-18 ASSESSMENT — MIFFLIN-ST. JEOR: SCORE: 1011.95

## 2019-12-18 ASSESSMENT — PAIN SCALES - GENERAL: PAINLEVEL: SEVERE PAIN (6)

## 2019-12-18 NOTE — PROGRESS NOTES
"Oncology Rooming Note    December 18, 2019 1:38 PM   Amira Arreola is a 87 year old female who presents for:    Chief Complaint   Patient presents with     Oncology Clinic Visit     Initial Vitals: /71   Pulse 73   Resp 16   Ht 1.651 m (5' 5\")   Wt 57.6 kg (127 lb)   SpO2 100%   BMI 21.13 kg/m   Estimated body mass index is 21.13 kg/m  as calculated from the following:    Height as of this encounter: 1.651 m (5' 5\").    Weight as of this encounter: 57.6 kg (127 lb). Body surface area is 1.63 meters squared.  Severe Pain (6) Comment: Data Unavailable   No LMP recorded. Patient is postmenopausal.  Allergies reviewed: Yes  Medications reviewed: Yes    Medications: Medication refills not needed today.  Pharmacy name entered into [facility]:    [id number] [address] AT [facility] [address]  [facility] - [place] [address]    Clinical concerns: no      Camila Mishra CMA            "

## 2019-12-18 NOTE — PATIENT INSTRUCTIONS
1. Continue weekly dexamethasone.  2. Continue acyclovir.  3. Oxycodone 2.5 mg as needed for pain.  4. Follow up in 3 months with labs. Labs to be done few days before appointment.

## 2019-12-18 NOTE — Clinical Note
"    12/18/2019         RE: Amira Arreola  [address]  [place] [address]        Dear Colleague,    Thank you for referring your patient, Amira Arreola, to the [facility]. Please see a copy of my visit note below.    Oncology Rooming Note    December 18, 2019 1:38 PM   Amira Arreola is a 87 year old female who presents for:    Chief Complaint   Patient presents with     Oncology Clinic Visit     Initial Vitals: /71   Pulse 73   Resp 16   Ht 1.651 m (5' 5\")   Wt 57.6 kg (127 lb)   SpO2 100%   BMI 21.13 kg/m    Estimated body mass index is 21.13 kg/m  as calculated from the following:    Height as of this encounter: 1.651 m (5' 5\").    Weight as of this encounter: 57.6 kg (127 lb). Body surface area is 1.63 meters squared.  Severe Pain (6) Comment: Data Unavailable   No LMP recorded. Patient is postmenopausal.  Allergies reviewed: Yes  Medications reviewed: Yes    Medications: Medication refills not needed today.  Pharmacy name entered into [employer]:    [facility] #[id number] - [place], [place] - [address] AT 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     1. On 09/21/2015, WBC of 4.2, hemoglobin of 13.2 and platelets of 138.    -On 09/29/2015, SPEP does not reveal any M-spike.   -On 10/02/2015, JANET does not reveal any monoclonal protein.     -On 10/22/2015, urine immunofixation reveals monoclonal free kappa light chain.    2. On 05/11/2016, kappa light chain of 50, lambda light chain of 0.32 and ratio of kappa to lambda of 156.2.  3. Bone marrow biopsy on 05/25/2016 reveals 40-50% kappa light chain restricted plasma cells.  Cytogenetics is normal. FISH panel reveals translocation 11;14.    4. MRI of bones on 06/21/2016 and " 06/22/2016 reveals myeloma lesions.  5. On 08/24/2016, she was started on revlimid with dexamethasone 20 mg weekly. She did not have any significant response to treatment.   6. Velcade and dexamethasone started on 03/21/2017.    7. Daratumumab added to velcade and dexamethasone on 05/31/2017.   -Velcade given every 14 days starting 08/01/2018.  -Treatment on hold. Last Velcade was on 06/05/2019.  Last daratumumab was on 05/22/2019. Dexamethasone continued.  8. On 05/22/2019, kappa free light chain of 1.21.      SUBJECTIVE:  Ms. Arreola is an 87-year-old female with kappa free light chain multiple myeloma.  She was on Velcade, daratumumab and dexamethasone.  She has not been getting Velcade or daratumumab for 6 months.  She is just on weekly dexamethasone.  Myeloma is stable.  Labs on 12/11/2019 reveal myeloma to be stable.      The patient overall is doing well for her age.  She has fatigue.  No headache.  No dizziness.  No chest pain or shortness of breath.  She has a cough which is improving.  No abdominal pain, nausea or vomiting.  Appetite is fair.  No urinary or bowel complaints.      She has chronic back pain.  She has been on Percocet.  Her  is concerned regarding it.  She takes about 1 Percocet every night.  He thinks she takes Percocet mainly to help her sleep and not because of pain.  The patient says that she takes Percocet to relieve her pain so that she can sleep better.      PHYSICAL EXAMINATION:   GENERAL:  She was alert and oriented x 3.   VITAL SIGNS:  Reviewed, ECOG PS of 2.   The rest of the systems not examined.      LABORATORY DATA:  Reviewed.      ASSESSMENT:   1.  An 87-year-old female with kappa free light chain multiple myeloma:  Myeloma is stable.   2.  Chronic back pain.   3.  Fatigue secondary to her age.      PLAN:   1.  Discussed regarding myeloma.  Labs were all reviewed.  Myeloma is stable.      At this time, I would not recommend any treatment except weekly dexamethasone.  I am  worried that any treatment will cause worsening of her fatigue and deterioration of quality of life.      I told the patient we will monitor her closely.  I will see her in 3 months' time with labs.  She is agreeable for it.      2.  Discussed regarding pain management.  I am going to give her only oxycodone.  Advised her to take 2.5 mg every 12 hours as needed.  Most of the time she just takes it in the evening.  Side effects including sedation discussed.  I told the patient that she should try to avoid oxycodone as much as possible.  Hopefully, we can get her off it.  The patient says that she generally just uses it once a day.      3.  I will see her in 3 months' time.  Advised her to call us if she has worsening pain, worsening weakness, infection or any other concerns.      TOTAL FACE TO FACE TIME SPENT:  25 minutes, more than 50% of the time spent in counseling and coordination of care.         ITZ LOPEZ MD             D: 2019   T: 2019   MT:       Name:     ALBERTO PARSON   MRN:      [id number]        Account:      [id number]   :      1932           Visit Date:   2019      Document: [id number]        Again, thank you for allowing me to participate in the care of your patient.        Sincerely,        Itz Lopez MD

## 2019-12-18 NOTE — TELEPHONE ENCOUNTER
ANTICOAGULATION MANAGEMENT     Patient Name:  Amira Arreola  Date:  2019    ASSESSMENT /SUBJECTIVE:      Today's INR result of 2.7 is therapeutic. Goal INR of 2.0-3.0      Warfarin dose taken: Patient has been checking INR with [facility] and has been getting dose instructions from them 2mg Tue/Sat and 4mg the rest of the days of the week since 19    Diet: No new diet changes affecting INR    Medication changes/ interactions: No new medications/supplements affecting INR    Previous INR: Therapeutic 2.7    S/S of bleeding or thromboembolism: No    New injury or illness:  No    Upcoming surgery, procedure or cardioversion:  No    Additional findings: Patient will switch back to  [facility] managing as she is establishing Home Care      PLAN:    Spoke with Rajiv HENSLEY regarding INR result and instructed:     Warfarin Dosing Instructions: Continue your current warfarin dose    Instructed patient to follow up no later than: 2 weeks    Education provided: No      Rajiv RN verbalizes understanding and agrees to warfarin dosing plan.    Instructed to call the Anticoagulation Clinic for any changes, questions or concerns. (#[phone])        OBJECTIVE:  INR   Date Value Ref Range Status   2019 2.7 (A) 0.90 - 1.10 Final             Anticoagulation Summary  As of 2019    INR goal:   2.0-3.0   TTR:   67.9 % (10.6 mo)   INR used for dosin.7 (2019)   Warfarin maintenance plan:   4 mg (4 mg x 1) every Tue, Sat; 2 mg (4 mg x 0.5) all other days   Full warfarin instructions:   4 mg every Tue, Sat; 2 mg all other days   Weekly warfarin total:   18 mg   Plan last modified:   Jessica Charlton, RN (3/13/2019)   Next INR check:      Priority:   INR   Target end date:   Indefinite    Indications    Long term current use of anticoagulant therapy [Z79.01]  Atrial fibrillation (H) [I48.91] (Resolved) [I48.91]             Anticoagulation Episode Summary     INR check location:       Preferred lab:    EXTERNAL LAB    Send INR reminders to:   DANTE SUMNER    Comments:    INR to be drawn at infusion visit Please notify  Zhane [phone] Patient can be reached at home phone [phone]. Do not leave dosing with spouse      Anticoagulation Care Providers     Provider Role Specialty Phone number    Addy Frias MD [facility] Internal Medicine [phone]

## 2019-12-19 NOTE — PROGRESS NOTES
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     1. On 09/21/2015, WBC of 4.2, hemoglobin of 13.2 and platelets of 138.    -On 09/29/2015, SPEP does not reveal any M-spike.   -On 10/02/2015, JANET does not reveal any monoclonal protein.     -On 10/22/2015, urine immunofixation reveals monoclonal free kappa light chain.    2. On 05/11/2016, kappa light chain of 50, lambda light chain of 0.32 and ratio of kappa to lambda of 156.2.  3. Bone marrow biopsy on 05/25/2016 reveals 40-50% kappa light chain restricted plasma cells.  Cytogenetics is normal. FISH panel reveals translocation 11;14.    4. MRI of bones on 06/21/2016 and 06/22/2016 reveals myeloma lesions.  5. On 08/24/2016, she was started on revlimid with dexamethasone 20 mg weekly. She did not have any significant response to treatment.   6. Velcade and dexamethasone started on 03/21/2017.    7. Daratumumab added to velcade and dexamethasone on 05/31/2017.   -Velcade given every 14 days starting 08/01/2018.  -Treatment on hold. Last Velcade was on 06/05/2019.  Last daratumumab was on 05/22/2019. Dexamethasone continued.  8. On 05/22/2019, kappa free light chain of 1.21.      SUBJECTIVE:  Ms. Arreola is an 87-year-old female with kappa free light chain multiple myeloma.  She was on Velcade, daratumumab and dexamethasone.  She has not been getting Velcade or daratumumab for 6 months.  She is just on weekly dexamethasone.  Myeloma is stable.  Labs on 12/11/2019 reveal myeloma to be stable.      The patient overall is doing well for her age.  She has fatigue.  No headache.  No dizziness.  No chest pain or shortness of breath.  She has a cough which is improving.  No abdominal pain, nausea or vomiting.  Appetite is fair.  No urinary or bowel complaints.      She has chronic back pain.  She has been on Percocet.  Her  is concerned regarding it.  She takes about 1 Percocet every  night.  He thinks she takes Percocet mainly to help her sleep and not because of pain.  The patient says that she takes Percocet to relieve her pain so that she can sleep better.      PHYSICAL EXAMINATION:   GENERAL:  She was alert and oriented x 3.   VITAL SIGNS:  Reviewed, ECOG PS of 2.   The rest of the systems not examined.      LABORATORY DATA:  Reviewed.      ASSESSMENT:   1.  An 87-year-old female with kappa free light chain multiple myeloma:  Myeloma is stable.   2.  Chronic back pain.   3.  Fatigue secondary to her age.      PLAN:   1.  Discussed regarding myeloma.  Labs were all reviewed.  Myeloma is stable.      At this time, I would not recommend any treatment except weekly dexamethasone.  I am worried that any treatment will cause worsening of her fatigue and deterioration of quality of life.      I told the patient we will monitor her closely.  I will see her in 3 months' time with labs.  She is agreeable for it.      2.  Discussed regarding pain management.  I am going to give her only oxycodone.  Advised her to take 2.5 mg every 12 hours as needed.  Most of the time she just takes it in the evening.  Side effects including sedation discussed.  I told the patient that she should try to avoid oxycodone as much as possible.  Hopefully, we can get her off it.  The patient says that she generally just uses it once a day.      3.  I will see her in 3 months' time.  Advised her to call us if she has worsening pain, worsening weakness, infection or any other concerns.      TOTAL FACE TO FACE TIME SPENT:  25 minutes, more than 50% of the time spent in counseling and coordination of care.         ITZ LOPEZ MD             D: 2019   T: 2019   MT:       Name:     ALBERTO PARSON   MRN:      [id number]        Account:      [id number]   :      1932           Visit Date:   2019      Document: [id number]

## 2019-12-31 ENCOUNTER — TELEPHONE (OUTPATIENT)
Dept: FAMILY MEDICINE | Facility: CLINIC | Age: 84
End: 2019-12-31

## 2019-12-31 ENCOUNTER — OFFICE VISIT (OUTPATIENT)
Dept: FAMILY MEDICINE | Facility: CLINIC | Age: 84
End: 2019-12-31
Payer: MEDICARE

## 2019-12-31 VITALS
OXYGEN SATURATION: 99 % | WEIGHT: 131 LBS | TEMPERATURE: 97 F | DIASTOLIC BLOOD PRESSURE: 73 MMHG | SYSTOLIC BLOOD PRESSURE: 125 MMHG | HEIGHT: 65 IN | BODY MASS INDEX: 21.83 KG/M2 | HEART RATE: 70 BPM

## 2019-12-31 DIAGNOSIS — I48.91 ATRIAL FIBRILLATION, UNSPECIFIED TYPE (H): ICD-10-CM

## 2019-12-31 DIAGNOSIS — S22.000G CLOSED COMPRESSION FRACTURE OF THORACIC VERTEBRA WITH DELAYED HEALING, SUBSEQUENT ENCOUNTER: ICD-10-CM

## 2019-12-31 DIAGNOSIS — D69.6 THROMBOCYTOPENIA (H): ICD-10-CM

## 2019-12-31 DIAGNOSIS — I27.20 MODERATE TO SEVERE PULMONARY HYPERTENSION (H): ICD-10-CM

## 2019-12-31 DIAGNOSIS — C90.00 MULTIPLE MYELOMA NOT HAVING ACHIEVED REMISSION (H): ICD-10-CM

## 2019-12-31 DIAGNOSIS — Z79.01 LONG TERM CURRENT USE OF ANTICOAGULANT THERAPY: ICD-10-CM

## 2019-12-31 DIAGNOSIS — I27.20 PULMONARY HYPERTENSION (H): ICD-10-CM

## 2019-12-31 DIAGNOSIS — G62.0 DRUG-INDUCED POLYNEUROPATHY (H): ICD-10-CM

## 2019-12-31 DIAGNOSIS — Z00.00 ROUTINE GENERAL MEDICAL EXAMINATION AT A HEALTH CARE FACILITY: Primary | ICD-10-CM

## 2019-12-31 DIAGNOSIS — R21 RASH AND NONSPECIFIC SKIN ERUPTION: ICD-10-CM

## 2019-12-31 DIAGNOSIS — C79.51 CANCER, METASTATIC TO BONE (H): ICD-10-CM

## 2019-12-31 PROBLEM — I24.9 ACS (ACUTE CORONARY SYNDROME) (H): Status: RESOLVED | Noted: 2019-07-03 | Resolved: 2019-12-31

## 2019-12-31 LAB — INR PPP: 2.7 (ref 0.86–1.14)

## 2019-12-31 PROCEDURE — 99213 OFFICE O/P EST LOW 20 MIN: CPT | Mod: 25 | Performed by: INTERNAL MEDICINE

## 2019-12-31 PROCEDURE — 36416 COLLJ CAPILLARY BLOOD SPEC: CPT | Performed by: INTERNAL MEDICINE

## 2019-12-31 PROCEDURE — G0439 PPPS, SUBSEQ VISIT: HCPCS | Performed by: INTERNAL MEDICINE

## 2019-12-31 PROCEDURE — 85610 PROTHROMBIN TIME: CPT | Performed by: INTERNAL MEDICINE

## 2019-12-31 RX ORDER — NYSTATIN 100000 U/G
CREAM TOPICAL 2 TIMES DAILY
Qty: 30 G | Refills: 0 | Status: SHIPPED | OUTPATIENT
Start: 2019-12-31 | End: 2022-01-01

## 2019-12-31 ASSESSMENT — ACTIVITIES OF DAILY LIVING (ADL): CURRENT_FUNCTION: NO ASSISTANCE NEEDED

## 2019-12-31 ASSESSMENT — MIFFLIN-ST. JEOR: SCORE: 1030.09

## 2019-12-31 NOTE — PROGRESS NOTES
SUBJECTIVE:   Amira Arreola is a 87 year old female who presents for Preventive Visit.    Overall the patient has been stable and doing fairly well.  She had 3 hospital stays in 2019 for A. fib, hypotension, CHF.  This is been very stable and she has regular follow-up with cardiology for this.    Patient has multiple myeloma and currently is on dexamethasone therapy once a week.  She has very regular follow-up with oncology for this.    She has some chronic low back pain with a prior compression fracture is noted.  She takes oxycodone infrequently for this.    She otherwise has no complaints.  She has not been falling.  No chest pain or shortness of breath or GI symptoms.  No fevers.  Her weight has been stable.               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 Roberts     Moderate to severe pulmonary hypertension (H) 03/2019    on echo     Multiple myeloma (H) 2016    dx 5/16 at [place], bone lesions seen on mri 6/16     OAB (overactive bladder) 2013    Dr. Grullon     Osteoporosis     fu done 2010 and stable, went off meds then, fu done 2013; has had gyn fu and added evista 2013 by gyn     Palpitations 4/16    nl echo, mildly dilated asc aorta     Paroxysmal atrial fibrillation (H)  2016    had palp and ziopatch showed it, echo nl lv fxn, mild mr and tr, added coum and toprol, toprol dose raised 16; hosp  for this 3x, then had av solomon ablation and ppm      Sciatica of left side     Dr. Helen Ricardo      SVT (supraventricular tachycardia) (H)     on ziopatch     Thrombocytopenia (H)              Past Surgical History:      Past Surgical History:   Procedure Laterality Date     BONE MARROW BIOPSY, BONE SPECIMEN, NEEDLE/TROCAR N/A 2016    Procedure: BIOPSY BONE MARROW;  Surgeon: Bryan Patel MD;  Location:  GI     CATARACT IOL, RT/LT        SECTION  ,      COLONOSCOPY  2013    Procedure: COLONOSCOPY;  COLONOSCOPY;  Surgeon: Steffany Rockwell MD;  Location:  GI     EP ABLATION AV NODE N/A 2019    Procedure: EP Ablation AV Node;  Surgeon: Lee Menchaca MD;  Location:  [facility]ARD[facility]ATH LAB     EP PACEMAKER N/A 2019    Procedure: EP Pacemaker;  Surgeon: Lee Menchaca MD;  Location:  HEART [facility]ARD[facility] CATH LAB     EXCISE EXOSTOSIS TIBIA / FIBULA  2014    Procedure: EXCISE EXOSTOSIS TIBIA / FIBULA;  Surgeon: Naial Pichardo MD;  Location:  SD     hysteroscopy and d and c      due to bleeding     left anle replacement       right ankle surgery               Social History:     Social History     Socioeconomic History     Marital status:      Spouse name: Tom     Number of children: 6     Years of education: Not on file     Highest education level: Not on file   Occupational History     Occupation: rn anesthetist     Employer: RETIRED   Social Needs     Financial resource strain: Not on file     Food insecurity:     Worry: Not on file     Inability: Not on file     Transportation needs:     Medical: Not on file     Non-medical: Not on file   Tobacco Use     Smoking status: Never Smoker     Smokeless tobacco: Never Used   Substance and Sexual Activity     Alcohol  use: No     Alcohol/week: 0.0 standard drinks     Drug use: No     Sexual activity: Never   Lifestyle     Physical activity:     Days per week: Not on file     Minutes per session: Not on file     Stress: Not on file   Relationships     Social connections:     Talks on phone: Not on file     Gets together: Not on file     Attends Holiness service: Not on file     Active member of club or organization: Not on file     Attends meetings of clubs or organizations: Not on file     Relationship status: Not on file     Intimate partner violence:     Fear of current or ex partner: Not on file     Emotionally abused: Not on file     Physically abused: Not on file     Forced sexual activity: Not on file   Other Topics Concern     Parent/sibling w/ CABG, MI or angioplasty before 65F 55M? Not Asked   Social History Narrative     Not on file             Family History:   reviewed         Allergies:     Allergies   Allergen Reactions     Blood Transfusion Related (Informational Only)      Blood antigens related to receiving Darzelex-AG     Penicillin [Penicillins] Rash     Blotches on chest              Medications:     Current Outpatient Medications   Medication Sig Dispense Refill     acetaminophen (TYLENOL) 500 MG tablet Take 500 mg by mouth every 6 hours as needed for mild pain        acyclovir (ZOVIRAX) 400 MG tablet Take 1 tablet (400 mg) by mouth 2 times daily 60 tablet 3     Carboxymethylcellulose Sod PF (REFRESH PLUS) 0.5 % SOLN ophthalmic solution 1 drop 4 times daily as needed for dry eyes       dexamethasone (DECADRON) 4 MG tablet Take 20mg (5 tablets) by mouth on Wed 28 tablet 3     furosemide (LASIX) 20 MG tablet Take 3 (60mg) tablets daily, if weight > 137 pounds take an additional 20mg (1 tablet) 270 tablet 1     guaiFENesin-codeine (ROBITUSSIN AC) 100-10 MG/5ML solution Take 2.5-5 mLs by mouth nightly as needed for cough 100 mL 0     latanoprost (XALATAN) 0.005 % ophthalmic solution Place 1 drop into the right  "eye At Bedtime       lidocaine-prilocaine (EMLA) cream Apply to port site 1 hour prior to access 30 g 1     Multiple Vitamins-Minerals (DAILY MULTIVITAMIN) CAPS Take 1 tablet by mouth daily        nystatin (MYCOSTATIN) 270336 UNIT/GM external cream Apply topically 2 times daily 30 g 0     order for DME Equipment being ordered: Nebulizer with tubes/mask/acessories, use as directed 1 Device 0     oxyCODONE (ROXICODONE) 5 MG tablet Take half a pill every 12 hours as needed for pain. 30 tablet 0     polyethylene glycol (MIRALAX/GLYCOLAX) powder Take 17 g by mouth daily as needed for constipation        potassium chloride (KLOR-CON) 20 MEQ packet Take 20 mEq by mouth 2 times daily 180 packet 1     SIMBRINZA 1-0.2 % ophthalmic suspension Place 1 drop into the right eye 2 times daily 1 drop AM and PM  2     Sodium Fluoride (SF 5000 PLUS) 1.1 % CREA Apply to affected area 3 times daily       triamcinolone (KENALOG) 0.1 % external cream Apply topically 2 times daily 15 g 1     warfarin (COUMADIN) 4 MG tablet Take one tablet (4 mg) by mouth on Tuesdays, Thursdays  and Saturdays; take one-half tablet ( 2 mg) on all other days of the week.       warfarin ANTICOAGULANT (COUMADIN) 4 MG tablet Take 1 tab (4 mg) every Tue, Sat; and take 1/2 tab (2 mg) all other days of the week or as directed by your INR Clinic. 90 tablet 0               Review of Systems:   The 10 point Review of Systems is negative other than noted in the HPI           Physical Exam:   Blood pressure 125/73, pulse 70, temperature 97  F (36.1  C), temperature source Oral, height 1.651 m (5' 5\"), weight 59.4 kg (131 lb), SpO2 99 %, not currently breastfeeding.    Exam:  Constitutional: healthy appearing, alert and in no distress  Heent: Normocephalic. Head without obvious masses or lesions. PERRLDC, EOMI. Mouth exam within normal limits: tongue, mucous membranes, posterior pharynx all normal, no lesions or abnormalities seen.  Tm's and canals within normal limits " bilaterally. Neck supple, no nuchal rigidity or masses. No supraclavicular, or cervical adenopathy. Thyroid symmetric, no masses.  Cardiovascular: Regular rate and rhythm, no murmer, rub or gallops.  JVP not elevated, no edema.  Carotids within normal limits bilaterally, no bruits.  Respiratory: Normal respiratory effort.  Lungs clear, normal flow, no wheezing or crackles.  Gastrointestinal: Normal active bowel sounds.   Soft, not tender, no masses, guarding or rebound.  No hepatosplenomegaly.   Musculoskeletal: extremities normal, no gross deformities noted.  Skin: no suspicious lesions or rashes x fungal rash under bra on chest  Neurologic: Mental status within normal limits.  Speech fluent.  No gross motor abnormalities and gait intact.  Psychiatric: mentation appears normal and affect normal.         Data:   Labs noted        Assessment:   1. Normal complete physical exam  2. Myeloma, stable, follow up onc  3. Prior comp fx, I have note to onc to see if treatment needed given this and steroid use  4. Afib, post avn ablation and ppm  5. Chf, stabel  6. Mets to bone  7. Drug induced neurop  8. Valve dz, pulm hypertension, follow up cards  9. Fungal skin rash on chest, to use nystatin  10. hcm         Plan:   Nystatin cream bid, call if not gone soon  Follow up cards and rheum  Call if problems  No labs needed  Up to date immunizations  Above discussed with patient and son      Addy Frias M.D.                Are you in the first 12 months of your Medicare coverage?  No    Healthy Habits:    In general, how would you rate your overall health?  Good    Frequency of exercise:  None    Duration of exercise:  Less than 15 minutes    Taking medications regularly:  Yes    Barriers to taking medications:  Not applicable    Medication side effects:  Not applicable    Ability to successfully perform activities of daily living:  No assistance needed    Home Safety:  No safety concerns identified    Hearing Impairment:  No  hearing concerns    In the past 6 months, have you been bothered by leaking of urine?  No    In general, how would you rate your overall mental or emotional health?  Good      PHQ-2 Total Score:    Additional concerns today:  No    Do you feel safe in your environment? Yes    Have you ever done Advance Care Planning? (For example, a Health Directive, POLST, or a discussion with a medical provider or your loved ones about your wishes): Yes, advance care planning is on file.      Fall risk  Fallen 2 or more times in the past year?: No  Any fall with injury in the past year?: No    Cognitive Screening   1) Repeat 3 items (Leader, Season, Table)    2) Clock draw: NORMAL  3) 3 item recall: Recalls 2 objects   Results: NORMAL clock, 1-2 items recalled: COGNITIVE IMPAIRMENT LESS LIKELY    Mini-CogTM Copyright ZAK Serrano. Licensed by the author for use in [facility]; reprinted with permission (angelique@[facility]). All rights reserved.      Do you have sleep apnea, excessive snoring or daytime drowsiness?: no    Reviewed and updated as needed this visit by clinical staff         Reviewed and updated as needed this visit by Provider        Social History     Tobacco Use     Smoking status: Never Smoker     Smokeless tobacco: Never Used   Substance Use Topics     Alcohol use: No     Alcohol/week: 0.0 standard drinks     If you drink alcohol do you typically have >3 drinks per day or >7 drinks per week? No    Alcohol Use 9/29/2016   Prescreen: >3 drinks/day or >7 drinks/week? The patient does not drink >3 drinks per day nor >7 drinks per week.               Current providers sharing in care for this patient include:   Patient Care Team:  Addy Frias MD as PCP - General (Internal Medicine)  Addy Frias MD as Assigned PCP    The following health maintenance items are reviewed in Epic and correct as of today:  Health Maintenance   Topic Date Due     HF ACTION PLAN  07/17/1932     URINE DRUG SCREEN   "07/17/1932     ZOSTER IMMUNIZATION (1 of 2) 07/17/1982     MEDICARE ANNUAL WELLNESS VISIT  09/29/2017     LIPID  10/12/2017     BMP  06/11/2020     ALT  08/07/2020     CBC  12/11/2020     FALL RISK ASSESSMENT  12/13/2020     DTAP/TDAP/TD IMMUNIZATION (3 - Td) 08/27/2022     ADVANCE CARE PLANNING  07/08/2024     PHQ-2  Completed     INFLUENZA VACCINE  Completed     PNEUMOCOCCAL IMMUNIZATION 65+ HIGH/HIGHEST RISK  Completed     IPV IMMUNIZATION  Aged Out     MENINGITIS IMMUNIZATION  Aged Out           Review of Systems      OBJECTIVE:   There were no vitals taken for this visit. Estimated body mass index is 21.13 kg/m  as calculated from the following:    Height as of 12/18/19: 1.651 m (5' 5\").    Weight as of 12/18/19: 57.6 kg (127 lb).  Physical Exam          ASSESSMENT / PLAN:       COUNSELING:  Reviewed preventive health counseling, as reflected in patient instructions       Regular exercise       Healthy diet/nutrition    Estimated body mass index is 21.13 kg/m  as calculated from the following:    Height as of 12/18/19: 1.651 m (5' 5\").    Weight as of 12/18/19: 57.6 kg (127 lb).         reports that she has never smoked. She has never used smokeless tobacco.      Appropriate preventive services were discussed with this patient, including applicable screening as appropriate for cardiovascular disease, diabetes, osteopenia/osteoporosis, and glaucoma.  As appropriate for age/gender, discussed screening for colorectal cancer, prostate cancer, breast cancer, and cervical cancer. Checklist reviewing preventive services available has been given to the patient.    Reviewed patients plan of care and provided an AVS. The Basic Care Plan (routine screening as documented in Health Maintenance) for Amira meets the Care Plan requirement. This Care Plan has been established and reviewed with the Patient and son.    Counseling Resources:  ATP IV Guidelines  Pooled Cohorts Equation Calculator  Breast Cancer Risk " Calculator  FRAX Risk Assessment  ICSI Preventive Guidelines  Dietary Guidelines for Americans, 2010  [employer]'s MyPlate  ASA Prophylaxis  Lung CA Screening    Addy Frias MD  [facility]    Identified Health Risks:

## 2019-12-31 NOTE — PATIENT INSTRUCTIONS
Use the cream for the rash on the chest twice daily.  If the rash is not gone over the next 2 weeks let me know    Addy Frias M.D.

## 2019-12-31 NOTE — TELEPHONE ENCOUNTER
ANTICOAGULATION MANAGEMENT     Patient Name:  Amira Arreola  Date:  2019    ASSESSMENT /SUBJECTIVE:      Today's INR result of 2.7 is therapeutic. Goal INR of 2.0-3.0      Warfarin dose taken: Warfarin taken as previously instructed    Diet: No new diet changes affecting INR    Medication changes/ interactions: No new medications/supplements affecting INR    Previous INR: Therapeutic     S/S of bleeding or thromboembolism: No    New injury or illness:  No    Upcoming surgery, procedure or cardioversion:  No    Additional findings: None      PLAN:    Spoke with Amira regarding INR result and instructed:     Warfarin Dosing Instructions: Continue your current warfarin dose    Instructed patient to follow up no later than: 6 weeks    Education provided: Yes: signs and symptoms to watch for and if noted to be seen.        Amira verbalizes understanding and agrees to warfarin dosing plan.    Instructed to call the Anticoagulation Clinic for any changes, questions or concerns. (#[phone])        OBJECTIVE:  INR   Date Value Ref Range Status   2019 2.70 (H) 0.86 - 1.14 Final             Anticoagulation Summary  As of 2019    INR goal:   2.0-3.0   TTR:   69.8 % (10.7 mo)   INR used for dosin.70 (2019)   Warfarin maintenance plan:   2 mg (4 mg x 0.5) every Tue, Sat; 4 mg (4 mg x 1) all other days   Full warfarin instructions:   2 mg every Tue, Sat; 4 mg all other days   Weekly warfarin total:   24 mg   No change documented:   Tameka Perez RN   Plan last modified:   Ines Dorman RN (2019)   Next INR check:   2020   Priority:   Maintenance   Target end date:   Indefinite    Indications    Long term current use of anticoagulant therapy [Z79.01]  Atrial fibrillation (H) [I48.91] (Resolved) [I48.91]             Anticoagulation Episode Summary     INR check location:       Preferred lab:   EXTERNAL LAB    Send INR reminders to:   DANTE SUMNER    Comments:     Rajiv RN [facility] [phone] private pay nursing visits to check INR      Anticoagulation Care Providers     Provider Role Specialty Phone number    Addy Frias MD [facility] Internal Medicine [phone]

## 2020-01-02 ENCOUNTER — TELEPHONE (OUTPATIENT)
Dept: FAMILY MEDICINE | Facility: CLINIC | Age: 85
End: 2020-01-02

## 2020-01-02 NOTE — TELEPHONE ENCOUNTER
Anticoagulation Management     Rajiv home care nurse with [facility] called requesting to followup on patient's next INR check and new dosing after having INR checked in clinic on 12/31/19, Home care nurse was given next INR   Check of 2/11/2020, dosing instruction of 2 mg on Tues/Sat and 4 mg all other days and INR result of 2.7.          Anticoagulation clinic to follow up    Suyapa Walton RN

## 2020-01-03 ENCOUNTER — CARE COORDINATION (OUTPATIENT)
Dept: CARDIOLOGY | Facility: CLINIC | Age: 85
End: 2020-01-03

## 2020-01-03 DIAGNOSIS — I50.32 CHRONIC DIASTOLIC HEART FAILURE (H): Primary | ICD-10-CM

## 2020-01-03 RX ORDER — POTASSIUM CHLORIDE 1500 MG/1
20 TABLET, EXTENDED RELEASE ORAL 2 TIMES DAILY
Qty: 60 TABLET | Refills: 11 | Status: SHIPPED | OUTPATIENT
Start: 2020-01-03 | End: 2020-01-08

## 2020-01-03 NOTE — PROGRESS NOTES
Pt's dtr Yesi called to report that pt's potassium supplement  Powder rx has increased in cost to ~$400 per dispense. Unclear reason.    Yesi didn't know reason that pt is on potassium powder rather than tablet; she denied pt has had any swallowing issues. I told Yesi that I have found that potassium tablets sometimes are more cost effective. Yesi was agreeable for me to send in rx for tablets; task complete. I asked her to call if this does not improve cost and we could consider PA/tier exception.    Pt has CORE follow-up 1/8.    Future Appointments   Date Time Provider Department Center   1/8/2020 12:30 PM MA LAB [facility] PSA CLIN   1/8/2020  1:10 PM [facility]   1/22/2020 12:30 PM  FAST TRACK LAB [facility] [place]   3/4/2020  1:00 PM SH FAST TRACK LAB [facility] [place]   [facility]2020 12:00 AM [facility]   3/18/2020  1:00 PM Shayne Roberts MD [facility]     Mary Galaviz RN BSN   4:15 PM 01/03/20

## 2020-01-08 ENCOUNTER — OFFICE VISIT (OUTPATIENT)
Dept: CARDIOLOGY | Facility: CLINIC | Age: 85
End: 2020-01-08
Attending: NURSE PRACTITIONER
Payer: MEDICARE

## 2020-01-08 VITALS
DIASTOLIC BLOOD PRESSURE: 78 MMHG | HEIGHT: 65 IN | OXYGEN SATURATION: 99 % | SYSTOLIC BLOOD PRESSURE: 128 MMHG | WEIGHT: 132.5 LBS | BODY MASS INDEX: 22.08 KG/M2 | HEART RATE: 70 BPM

## 2020-01-08 DIAGNOSIS — I27.20 MODERATE TO SEVERE PULMONARY HYPERTENSION (H): ICD-10-CM

## 2020-01-08 DIAGNOSIS — I50.32 CHRONIC DIASTOLIC HEART FAILURE (H): ICD-10-CM

## 2020-01-08 DIAGNOSIS — I50.32 CHRONIC DIASTOLIC HEART FAILURE (H): Primary | ICD-10-CM

## 2020-01-08 DIAGNOSIS — I48.91 ATRIAL FIBRILLATION, UNSPECIFIED TYPE (H): ICD-10-CM

## 2020-01-08 DIAGNOSIS — I07.1 TRICUSPID VALVE INSUFFICIENCY, UNSPECIFIED ETIOLOGY: ICD-10-CM

## 2020-01-08 DIAGNOSIS — I34.0 MITRAL VALVE INSUFFICIENCY, UNSPECIFIED ETIOLOGY: ICD-10-CM

## 2020-01-08 LAB
ANION GAP SERPL CALCULATED.3IONS-SCNC: 11.4 MMOL/L (ref 6–17)
BUN SERPL-MCNC: 15 MG/DL (ref 7–30)
CALCIUM SERPL-MCNC: 9.9 MG/DL (ref 8.5–10.5)
CHLORIDE SERPL-SCNC: 103 MMOL/L (ref 98–107)
CO2 SERPL-SCNC: 28 MMOL/L (ref 23–29)
CREAT SERPL-MCNC: 1.09 MG/DL (ref 0.7–1.3)
GFR SERPL CREATININE-BSD FRML MDRD: 47 ML/MIN/{1.73_M2}
GLUCOSE SERPL-MCNC: 129 MG/DL (ref 70–105)
POTASSIUM SERPL-SCNC: 3.4 MMOL/L (ref 3.5–5.1)
SODIUM SERPL-SCNC: 139 MMOL/L (ref 136–145)

## 2020-01-08 PROCEDURE — 80048 BASIC METABOLIC PNL TOTAL CA: CPT | Performed by: INTERNAL MEDICINE

## 2020-01-08 PROCEDURE — 99214 OFFICE O/P EST MOD 30 MIN: CPT | Performed by: NURSE PRACTITIONER

## 2020-01-08 PROCEDURE — 36415 COLL VENOUS BLD VENIPUNCTURE: CPT | Performed by: INTERNAL MEDICINE

## 2020-01-08 RX ORDER — POTASSIUM CHLORIDE 1500 MG/1
TABLET, EXTENDED RELEASE ORAL
Qty: 180 TABLET | Refills: 3 | Status: SHIPPED | OUTPATIENT
Start: 2020-01-08 | End: 2020-10-29

## 2020-01-08 ASSESSMENT — MIFFLIN-ST. JEOR: SCORE: 1036.9

## 2020-01-08 NOTE — LETTER
1/8/2020    Addy Frias MD  [address]  [facility] [address]    RE: Amira Arreola       Dear Colleague,    I had the pleasure of seeing Amira Arreola in the [facility].    Cardiology Clinic Progress Note  Amira Arreola MRN# [id number]   YOB: 1932 Age: 87 year old   Primary Cardiologist: Dr. Rivera Reason for visit: CORE follow up            Assessment and Plan:   Amira Arreola is a very pleasant 86 year old female with a history of HFpEF, chronic atrial fibrillation s/p AV solomon ablation with PPM implantation 7/5/19, hypertension, mitral regurgitation, tricuspid regurgitation and hx of multiple myeloma mets to bone (dx 2016, currently being treated with Daratumab, Velcade, and Dexamethasone every 2 weeks). Patient with 3 hospitalizations this year, see below for details. Patient here today for CORE follow up.    1.  Chronic diastolic heart failure/HFpEF - Echocardiogram completed 3/23/19 LVEF 55-60%, no wall motion abnormalities, moderate mitral regurgitation, moderate tricuspid regurgitation, and severe pulmonary hypertension. Weight today 132#, stable since last clinic visit. Patient appears compensated and euvolemic on exam. Lower extremity edema improved since last clinic visit when additional PRN furosemide was started for weight > 137#. Dietary indiscretions with sodium make volume status challenging. Most meals she is eating are picked up from [employer] or [employer] by their son. Labs from today show stable kidney function, creatinine 1.09. Potassium slightly low at 3.4, she transitioned from potassium powder to tablets today given cost. Will continue to monitor and advised today for her to take an additional potassium on days she takes extra furosemide. Overall patient is doing well from a HF standpoint, will continue furosemide 60mg daily with additional PRN dose of furosemide 20mg for weight > 137#.     - NYHA class III, stage C   - Fluid  status : euvolemic   - Dry weight : ~ 130#   - Diuretic regimen : continue furosemide to 60mg daily, if weight is > 137# advised to take an additional 20mg daily.    - Continue potassium to 20meq BID, start taking additional 20meq on days when she takes an extra furosemide.    - Aldosterone antagonist : none   - Blood pressure : controlled   - Reinforced HF education, reviewed low sodium diet, reviewed foods to avoid.     2. Chronic atrial fibrillation - s/p AV solomon ablation with PPM implantation 7/5/19, stable, significant improvement in symptoms since ablation and heart rate control achieved.    - [id number] score 5 (HTN, HF, age, female)   - Continue warfarin for thromboembolic prophylaxis.     3. Moderate mitral regurgitation, moderate tricuspid regurgitation   - Will consider repeat echocardiogram in the future if worsening symptoms, currently won't change any management clinically.  Last echo 3/2019.    4. Severe pulmonary hypertension - likely secondary to HFpEF and likely improved with diuresis.    - Will consider repeat echocardiogram in the future.     5. Multiple myeloma with mets to bone - follows with Dr. Roberts   - Chemo therapy currently on hold, continue follow up with Dr. Roberts.     7. Advance care planning - patient and family met with palliative care while hospitalized, code status changed to DNR/DNI, patient still wishing to seek restorative care. Patient and family have a reasonable understanding of her current health status. Patient has very supportive/involved family.    - Health care directive completed, electing her daughter Eufemia as POA.    - Reviewed that palliative care is available in the clinic for further support in the future. Patient and family note they will reach out if interested in meeting with outpatient palliative care in the future.         Changes today: none    Follow up plan:     Follow up with CORE in 3 months with labs          History of Presenting Illness:    Amira HAYNES  Maxwell is a very pleasant 86 year old female with a history of HFpEF, chronic atrial fibrillation, hypertension, mitral regurgitation, tricuspid regurgitation and hx of multiple myeloma mets to bone (dx 2016, currently being treated with Daratumab, Velcade, and Dexamethasone every 2 weeks).     Patient with 3 hospitalizations this year, most recent hospitalizations 7/3/19-7/7/19 presenting from the cardiology office with hypotension and altered mental status felt to be related to hypovolemia and hypercalcemia. Patient was in atrial fibrillation with RVR. Serial troponin negative. Unable to tolerate rate control medications due to hypotension s/p AV solomon ablation with PPM 7/5/19. S/p thoracentesis 7/5 250cc removed, transudative. Palliative care met with patient/family during hospital stay, code status changed to DNR/DNI but family still wishing to receive restorative care. Discharge weight 131#. Patient discharged to [facility]. 6/28/19-7/1/19 related to atrial fibrillation with RVR, acute on chronic diastolic heart failure and bilateral pleural effusions. BNP 13K on admission, 2K at time of discharge. Diltiazem increased to 180mg/day. Prior to this patient was hospitalized in March, see below for details.     Echocardiogram completed 3/23/19 LVEF 55-60%, no wall motion abnormalities, moderate mitral regurgitation, moderate tricuspid regurgitation, and severe pulmonary hypertension.     Patient has been following closely in [facility]ORE clinic for the past year, last seen 11/4/19 for CORE follow up, at that time she was noted to have continued to have lower extremity edema, elevated JVD and increased weight. Additional 20mg furosemide was added for her weight > 137#, which has since been helping with symptoms. Patient has been resistant to increases in her baseline diuretic due to increased urination. Challenges with managing volume status is significantly impacted by sodium indiscretions, most of patients meals are take out  and high sodium. Family notes this is just the way it is, patient lives with her son and he does the best he can with meals.     Patient is here today for CORE followup. Son present for clinic visit. Patient and son note that things are going well at home. Monitoring weights daily at home, weight has been fluctuating 1321-140#, more recently 130-133#. She has been taking additional 20mg furosemide when weight is > 137#, she has taken approximately 5x in the past month. Patient states lower extremity waxes and wanes, but improvement since starting additional PRN furosemide. Denies abdominal distention/bloating. Denies shortness of breath at rest. Denies exertional dyspnea with currently levels of activity. Using walker for assistance. Sleeping good. Denies orthopnea or PND. Sleeping with 2 pillows. Getting up 1 x a night to go to the bathroom. Patient denies chest pain or chest tightness. Denies dizziness, lightheadedness or other presyncopal symptoms. Denies tachycardia or palpitations. Denies falls. Denies bleeding episodes. Some complaints of chronic back pain, no changes or worsening. Taking medications daily. Daughter setting up the medications. Patient has been recently changed from potassium powder to potassium tablet    Labs from today show stable kidney function, creatinine 1.05, BUN 15, potassium slightly low at 3.4. Blood pressure 128/78 and HR 70.    Appetite is good. Son helps with dinner, patient notes that many items are things that are just warmed up in the oven, picking up meals from [employer] and [employer]. Not adding salt to foods. Patient reports drinking 2 cans soda, milk with meals, cup coffee in morning and also water. No tobacco or alcohol use. Using a walker for assistance at home. She had homemaker services 3 x a week for 4 hours to help with cleaning and bathing. Daughter setting up pill boxes.          Recent Hospitalizations   7/3/19-7/7/19 presenting from the cardiology office with  hypotension and altered mental status felt to be related to hypovolemia and hypercalcemia. Patient was in atrial fibrillation with RVR. Serial troponin negative. Unable to tolerate rate control medications due to hypotension s/p AV solomon ablation with PPM 7/5/19. Furosemide on hold during most of hospital stay due to concerns of hypovolemia, resumed at lower dosage 20mg at discharge. S/p thoracentesis 7/5 250cc removed, transudative. Palliative care met with patient/family during hospital stay, still wishing to receive restorative care.   6/28/19-7/1/19 related to atrial fibrillation with RVR, acute on chronic diastolic heart failure and bilateral pleural effusions. BNP 13K on admission, 2K at time of discharge. Diltiazem increased to 180mg/day.  3/22/19-3/28/19 presented with dyspnea, leg swelling and intermittent palpitations. Patient was found to have atrial fibrillation with RVR and acute diastolic heart failure exacerbation. Weight 3/24/19 147# (doesn't appear admission weight was completed). Discharge weight 135#.        Social History    , 6 children, Enjoys keeping the house clean and orderly. Retired nurse.   Social History     Socioeconomic History     Marital status:      Spouse name: Tom     Number of children: 6     Years of education: Not on file     Highest education level: Not on file   Occupational History     Occupation: rn anesthetist     Employer: RETIRED   Social Needs     Financial resource strain: Not on file     Food insecurity:     Worry: Not on file     Inability: Not on file     Transportation needs:     Medical: Not on file     Non-medical: Not on file   Tobacco Use     Smoking status: Never Smoker     Smokeless tobacco: Never Used   Substance and Sexual Activity     Alcohol use: No     Alcohol/week: 0.0 standard drinks     Drug use: No     Sexual activity: Never   Lifestyle     Physical activity:     Days per week: Not on file     Minutes per session: Not on file      "Stress: Not on file   Relationships     Social connections:     Talks on phone: Not on file     Gets together: Not on file     Attends Jehovah's witness service: Not on file     Active member of club or organization: Not on file     Attends meetings of clubs or organizations: Not on file     Relationship status: Not on file     Intimate partner violence:     Fear of current or ex partner: Not on file     Emotionally abused: Not on file     Physically abused: Not on file     Forced sexual activity: Not on file   Other Topics Concern     Parent/sibling w/ CABG, MI or angioplasty before 65F 55M? Not Asked   Social History Narrative     Not on file            Review of Systems:   Skin:  Positive for bruising   Eyes:  Positive for glaucoma  ENT:  Negative    Respiratory:  Negative    Cardiovascular:  Negative    Gastroenterology: Negative    Genitourinary:  Negative    Musculoskeletal:  Positive for arthritis  Neurologic:  Positive for local weakness;incoordination;numbness or tingling of feet  Psychiatric:  Negative    Heme/Lymph/Imm:  Positive for allergies  Endocrine:  Negative           Physical Exam:   Vitals: /78   Pulse 70   Ht 1.651 m (5' 5\")   Wt 60.1 kg (132 lb 8 oz)   SpO2 99%   BMI 22.05 kg/m      Wt Readings from Last 4 Encounters:   01/08/20 60.1 kg (132 lb 8 oz)   12/31/19 59.4 kg (131 lb)   12/18/19 57.6 kg (127 lb)   12/13/19 58 kg (127 lb 14.4 oz)     GEN: frail, elderly  HEENT:  Pupils equal, round. Sclerae nonicteric.   NECK: Supple, no masses appreciated.   C/V:  Irregularly irregular rate and rhythm, no murmur, rub or gallop.   RESP: Respirations are unlabored. Clear bilaterally. No wheezing or crackles.   GI: Abdomen distended, nontender.  EXTREM: trace to +1 bilateral lower extremity edema to about 3 inches above the ankle  NEURO: Alert and cooperative.  SKIN: Warm and dry.        Data:   ECHO 3/23/19  The left ventricle is normal in size.  The visual ejection fraction is estimated at " 55-60%.  No regional wall motion abnormalities noted.  There is moderate (2+) mitral regurgitation.  There is moderate (2+) tricuspid regurgitation.  Severe (>55mmHg) pulmonary hypertension is present.  The rhythm was rapid atrial fibrillation.    LIPID RESULTS:  Lab Results   Component Value Date    CHOL 160 10/12/2016    HDL 76 10/12/2016    LDL 71 10/12/2016    TRIG 66 10/12/2016    CHOLHDLRATIO 2.3 09/21/2015     LIVER ENZYME RESULTS:  Lab Results   Component Value Date    AST 20 08/07/2019    ALT 15 08/07/2019     CBC RESULTS:  Lab Results   Component Value Date    WBC 6.5 12/11/2019    RBC 4.13 12/11/2019    HGB 11.5 (L) 12/11/2019    HCT 35.8 12/11/2019    MCV 87 12/11/2019    MCH 27.8 12/11/2019    MCHC 32.1 12/11/2019    RDW 16.9 (H) 12/11/2019     12/11/2019     BMP RESULTS:  Lab Results   Component Value Date     01/08/2020    POTASSIUM 3.4 (L) 01/08/2020    CHLORIDE 103 01/08/2020    CO2 28 01/08/2020    ANIONGAP 11.4 01/08/2020     (H) 01/08/2020    BUN 15 01/08/2020    CR 1.09 01/08/2020    GFRESTIMATED 47 (L) 01/08/2020    GFRESTBLACK 57 (L) 01/08/2020    BRADLEY 9.9 01/08/2020      INR RESULTS:  Lab Results   Component Value Date    INR 2.70 (H) 12/31/2019    INR 2.7 (A) 12/18/2019            Medications     Current Outpatient Medications   Medication Sig Dispense Refill     acetaminophen (TYLENOL) 500 MG tablet Take 500 mg by mouth every 6 hours as needed for mild pain        acyclovir (ZOVIRAX) 400 MG tablet Take 1 tablet (400 mg) by mouth 2 times daily 60 tablet 3     Carboxymethylcellulose Sod PF (REFRESH PLUS) 0.5 % SOLN ophthalmic solution 1 drop 4 times daily as needed for dry eyes       dexamethasone (DECADRON) 4 MG tablet Take 20mg (5 tablets) by mouth on Wed 28 tablet 3     furosemide (LASIX) 20 MG tablet Take 3 (60mg) tablets daily, if weight > 137 pounds take an additional 20mg (1 tablet) 270 tablet 1     latanoprost (XALATAN) 0.005 % ophthalmic solution Place 1 drop into  the right eye At Bedtime       lidocaine-prilocaine (EMLA) cream Apply to port site 1 hour prior to access 30 g 1     Multiple Vitamins-Minerals (DAILY MULTIVITAMIN) CAPS Take 1 tablet by mouth daily        nystatin (MYCOSTATIN) 959971 UNIT/GM external cream Apply topically 2 times daily 30 g 0     order for DME Equipment being ordered: Nebulizer with tubes/mask/acessories, use as directed 1 Device 0     oxyCODONE (ROXICODONE) 5 MG tablet Take half a pill every 12 hours as needed for pain. 30 tablet 0     polyethylene glycol (MIRALAX/GLYCOLAX) powder Take 17 g by mouth daily as needed for constipation        potassium chloride ER (K-DUR/KLOR-CON M) 20 MEQ CR tablet Take 1 tablet 2 x a day, with an additional 1 tablet on days when you take extra fursoemide 180 tablet 3     SIMBRINZA 1-0.2 % ophthalmic suspension Place 1 drop into the right eye 2 times daily 1 drop AM and PM  2     Sodium Fluoride (SF 5000 PLUS) 1.1 % CREA Apply to affected area 3 times daily       triamcinolone (KENALOG) 0.1 % external cream Apply topically 2 times daily 15 g 1     warfarin (COUMADIN) 4 MG tablet Take one tablet (4 mg) by mouth on Tuesdays, Thursdays  and Saturdays; take one-half tablet ( 2 mg) on all other days of the week.       warfarin ANTICOAGULANT (COUMADIN) 4 MG tablet Take 1 tab (4 mg) every Tue, Sat; and take 1/2 tab (2 mg) all other days of the week or as directed by your INR Clinic. 90 tablet 0     guaiFENesin-codeine (ROBITUSSIN AC) 100-10 MG/5ML solution Take 2.5-5 mLs by mouth nightly as needed for cough (Patient not taking: Reported on 1/8/2020) 100 mL 0          Past Medical History     Past Medical History:   Diagnosis Date     Abnormal CXR 2018    then ct done and not significant     Acute diastolic heart failure (H) 03/22/2019    nl ef, 2+mr and tr with severe pulm htn     Ascending aorta dilatation (H) 04/2016    on echo, mild, fu 7/18 4.0, slightly larger     Cancer, metastatic to bone (H)     due to myeloma      Colonic polyp     adenomatous, fu 2013 tics only     Compression fracture     multiple areas of spine     Dry eyes      Elevated MCV     b12 and folic acid nl     HTN (hypertension) 2000    off meds for years     Lung nodule 2018    on ct, 4mm, ct done for fu abnl cxr     Menorrhagia     hysteroscopy and d and c done     MGUS (monoclonal gammopathy of unknown significance)     eval by Dr. Roberts     Moderate to severe pulmonary hypertension (H) 2019    on echo     Multiple myeloma (H) 2016    dx  at [facility], bone lesions seen on mri      OAB (overactive bladder)     Dr. Grullon     Osteoporosis     fu done  and stable, went off meds then, fu done ; has had gyn fu and added evista  by gyn     Palpitations     nl echo, mildly dilated asc aorta     Paroxysmal atrial fibrillation (H) 2016    had palp and ziopatch showed it, echo nl lv fxn, mild mr and tr, added coum and toprol, toprol dose raised 16; hosp  for this 3x, then had av solomon ablation and ppm      Sciatica of left side     Dr. Helen Ricardo 2004     SVT (supraventricular tachycardia) (H)     on ziopatch     Thrombocytopenia (H)      Past Surgical History:   Procedure Laterality Date     BONE MARROW BIOPSY, BONE SPECIMEN, NEEDLE/TROCAR N/A 2016    Procedure: BIOPSY BONE MARROW;  Surgeon: Bryan Patel MD;  Location:  GI     CATARACT IOL, RT/LT        SECTION  1965, 1966     COLONOSCOPY  2013    Procedure: COLONOSCOPY;  COLONOSCOPY;  Surgeon: Steffany Rockwell MD;  Location:  GI     EP ABLATION AV NODE N/A 2019    Procedure: EP Ablation AV Node;  Surgeon: Lee Menchaca MD;  Location:  HEART CARDIAC CATH LAB     EP PACEMAKER N/A 2019    Procedure: EP Pacemaker;  Surgeon: Lee Menchaca MD;  Location:  HEART CARD[facility]AC CATH LAB     EXCISE EXOSTOSIS TIBIA / FIBULA  2014    Procedure: EXCISE EXOSTOSIS TIBIA / FIBULA;   Surgeon: Naila Pichardo MD;  Location: [facility]     hysteroscopy and d and c  2002    due to bleeding     left anle replacement  2007     right ankle surgery  2003     Family History   Problem Relation Age of Onset     Heart Disease Father      C.A.D. Mother      Cerebrovascular Disease Brother      Family History Negative Sister      Family History Negative Sister      Family History Negative Brother             Allergies   Blood transfusion related (informational only) and Penicillin [penicillins]        DAYSI Arellano CNP  [facility]  Pager: [phone]      Thank you for allowing me to participate in the care of your patient.    Sincerely,     DAYSI Arellano CNP     [facility]

## 2020-01-08 NOTE — PATIENT INSTRUCTIONS
Call C[facility] nurse for any questions or concerns Mon-Fri 8am-4pm:                                                #[phone]                                       For concerns after hours:                                               #[phone]     1: Medication changes: Continue taking furosemide 60mg daily with an additional 20mg on days when your weight is > 137 pounds.    - Take an additional potassium tablet on days when you take an extra furosemide, for a total of 3 tablets on that day.   2: Plan from today:     - Follow up with Elis[facility] in 3 months   3: Lab results: see attached; show stable kidney function, slightly low potassium will continue to monitor.   Component      Latest Ref Rng & Units 12/11/2019 1/8/2020   Sodium      136 - 145 mmol/L 140 139   Potassium      3.5 - 5.1 mmol/L 3.6 3.4 (L)   Chloride      98 - 107 mmol/L 108 103   Carbon Dioxide      23 - 29 mmol/L 26 28   Anion Gap      6 - 17 mmol/L 6 11.4   Glucose      70 - 105 mg/dL 129 (H) 129 (H)   Urea Nitrogen      7 - 30 mg/dL 18 15   Creatinine      0.70 - 1.30 mg/dL 0.75 1.09   GFR Estimate      >60 mL/min/1.73:m2 71 47 (L)   GFR Estimate If Black      >60 mL/min/1.73:m2 83 57 (L)   Calcium      8.5 - 10.5 mg/dL 9.1 9.9

## 2020-01-08 NOTE — PROGRESS NOTES
Cardiology Clinic Progress Note  Amira Arreola MRN# [id number]   YOB: 1932 Age: 87 year old   Primary Cardiologist: Dr. Rivera Reason for visit: CORE follow up            Assessment and Plan:   Amira Arreola is a very pleasant 86 year old female with a history of HFpEF, chronic atrial fibrillation s/p AV solomon ablation with PPM implantation 7/5/19, hypertension, mitral regurgitation, tricuspid regurgitation and hx of multiple myeloma mets to bone (dx 2016, currently being treated with Daratumab, Velcade, and Dexamethasone every 2 weeks). Patient with 3 hospitalizations this year, see below for details. Patient here today for CORE follow up.    1.  Chronic diastolic heart failure/HFpEF - Echocardiogram completed 3/23/19 LVEF 55-60%, no wall motion abnormalities, moderate mitral regurgitation, moderate tricuspid regurgitation, and severe pulmonary hypertension. Weight today 132#, stable since last clinic visit. Patient appears compensated and euvolemic on exam. Lower extremity edema improved since last clinic visit when additional PRN furosemide was started for weight > 137#. Dietary indiscretions with sodium make volume status challenging. Most meals she is eating are picked up from [employer] or [employer] by their son. Labs from today show stable kidney function, creatinine 1.09. Potassium slightly low at 3.4, she transitioned from potassium powder to tablets today given cost. Will continue to monitor and advised today for her to take an additional potassium on days she takes extra furosemide. Overall patient is doing well from a HF standpoint, will continue furosemide 60mg daily with additional PRN dose of furosemide 20mg for weight > 137#.     - NYHA class III, stage C   - Fluid status : euvolemic   - Dry weight : ~ 130#   - Diuretic regimen : continue furosemide to 60mg daily, if weight is > 137# advised to take an additional 20mg daily.    - Continue potassium to 20meq BID, start taking additional  20meq on days when she takes an extra furosemide.    - Aldosterone antagonist : none   - Blood pressure : controlled   - Reinforced HF education, reviewed low sodium diet, reviewed foods to avoid.     2. Chronic atrial fibrillation - s/p AV solomon ablation with PPM implantation 7/5/19, stable, significant improvement in symptoms since ablation and heart rate control achieved.    - [id number] score 5 (HTN, HF, age, female)   - Continue warfarin for thromboembolic prophylaxis.     3. Moderate mitral regurgitation, moderate tricuspid regurgitation   - Will consider repeat echocardiogram in the future if worsening symptoms, currently won't change any management clinically.  Last echo 3/2019.    4. Severe pulmonary hypertension - likely secondary to HFpEF and likely improved with diuresis.    - Will consider repeat echocardiogram in the future.     5. Multiple myeloma with mets to bone - follows with Dr. Roberts   - Chemo therapy currently on hold, continue follow up with Dr. Roberts.     7. Advance care planning - patient and family met with palliative care while hospitalized, code status changed to DNR/DNI, patient still wishing to seek restorative care. Patient and family have a reasonable understanding of her current health status. Patient has very supportive/involved family.    - Health care directive completed, electing her daughter Eufemia as POA.    - Reviewed that palliative care is available in the clinic for further support in the future. Patient and family note they will reach out if interested in meeting with outpatient palliative care in the future.         Changes today: none    Follow up plan:     Follow up with CORE in 3 months with labs          History of Presenting Illness:    Amira Arreola is a very pleasant 86 year old female with a history of HFpEF, chronic atrial fibrillation, hypertension, mitral regurgitation, tricuspid regurgitation and hx of multiple myeloma mets to bone (dx 2016, currently being  treated with Daratumab, Velcade, and Dexamethasone every 2 weeks).     Patient with 3 hospitalizations this year, most recent hospitalizations 7/3/19-7/7/19 presenting from the cardiology office with hypotension and altered mental status felt to be related to hypovolemia and hypercalcemia. Patient was in atrial fibrillation with RVR. Serial troponin negative. Unable to tolerate rate control medications due to hypotension s/p AV solomon ablation with PPM 7/5/19. S/p thoracentesis 7/5 250cc removed, transudative. Palliative care met with patient/family during hospital stay, code status changed to DNR/DNI but family still wishing to receive restorative care. Discharge weight 131#. Patient discharged to [facility]. 6/28/19-7/1/19 related to atrial fibrillation with RVR, acute on chronic diastolic heart failure and bilateral pleural effusions. BNP 13K on admission, 2K at time of discharge. Diltiazem increased to 180mg/day. Prior to this patient was hospitalized in March, see below for details.     Echocardiogram completed 3/23/19 LVEF 55-60%, no wall motion abnormalities, moderate mitral regurgitation, moderate tricuspid regurgitation, and severe pulmonary hypertension.     Patient has been following closely in CORE clinic for the past year, last seen 11/4/19 for CORE follow up, at that time she was noted to have continued to have lower extremity edema, elevated JVD and increased weight. Additional 20mg furosemide was added for her weight > 137#, which has since been helping with symptoms. Patient has been resistant to increases in her baseline diuretic due to increased urination. Challenges with managing volume status is significantly impacted by sodium indiscretions, most of patients meals are take out and high sodium. Family notes this is just the way it is, patient lives with her son and he does the best he can with meals.     Patient is here today for CORE followup. Son present for clinic visit. Patient and son note that  things are going well at home. Monitoring weights daily at home, weight has been fluctuating 1321-140#, more recently 130-133#. She has been taking additional 20mg furosemide when weight is > 137#, she has taken approximately 5x in the past month. Patient states lower extremity waxes and wanes, but improvement since starting additional PRN furosemide. Denies abdominal distention/bloating. Denies shortness of breath at rest. Denies exertional dyspnea with currently levels of activity. Using walker for assistance. Sleeping good. Denies orthopnea or PND. Sleeping with 2 pillows. Getting up 1 x a night to go to the bathroom. Patient denies chest pain or chest tightness. Denies dizziness, lightheadedness or other presyncopal symptoms. Denies tachycardia or palpitations. Denies falls. Denies bleeding episodes. Some complaints of chronic back pain, no changes or worsening. Taking medications daily. Daughter setting up the medications. Patient has been recently changed from potassium powder to potassium tablet    Labs from today show stable kidney function, creatinine 1.05, BUN 15, potassium slightly low at 3.4. Blood pressure 128/78 and HR 70.    Appetite is good. Son helps with dinner, patient notes that many items are things that are just warmed up in the oven, picking up meals from [employer] and [employer]. Not adding salt to foods. Patient reports drinking 2 cans soda, milk with meals, cup coffee in morning and also water. No tobacco or alcohol use. Using a walker for assistance at home. She had homemaker services 3 x a week for 4 hours to help with cleaning and bathing. Daughter setting up pill boxes.          Recent Hospitalizations   7/3/19-7/7/19 presenting from the cardiology office with hypotension and altered mental status felt to be related to hypovolemia and hypercalcemia. Patient was in atrial fibrillation with RVR. Serial troponin negative. Unable to tolerate rate control medications due to hypotension s/p AV  solomon ablation with PPM 7/5/19. Furosemide on hold during most of hospital stay due to concerns of hypovolemia, resumed at lower dosage 20mg at discharge. S/p thoracentesis 7/5 250cc removed, transudative. Palliative care met with patient/family during hospital stay, still wishing to receive restorative care.   6/28/19-7/1/19 related to atrial fibrillation with RVR, acute on chronic diastolic heart failure and bilateral pleural effusions. BNP 13K on admission, 2K at time of discharge. Diltiazem increased to 180mg/day.  3/22/19-3/28/19 presented with dyspnea, leg swelling and intermittent palpitations. Patient was found to have atrial fibrillation with RVR and acute diastolic heart failure exacerbation. Weight 3/24/19 147# (doesn't appear admission weight was completed). Discharge weight 135#.        Social History    , 6 children, Enjoys keeping the house clean and orderly. Retired nurse.   Social History     Socioeconomic History     Marital status:      Spouse name: Tom     Number of children: 6     Years of education: Not on file     Highest education level: Not on file   Occupational History     Occupation: rn anesthetist     Employer: RETIRED   Social Needs     Financial resource strain: Not on file     Food insecurity:     Worry: Not on file     Inability: Not on file     Transportation needs:     Medical: Not on file     Non-medical: Not on file   Tobacco Use     Smoking status: Never Smoker     Smokeless tobacco: Never Used   Substance and Sexual Activity     Alcohol use: No     Alcohol/week: 0.0 standard drinks     Drug use: No     Sexual activity: Never   Lifestyle     Physical activity:     Days per week: Not on file     Minutes per session: Not on file     Stress: Not on file   Relationships     Social connections:     Talks on phone: Not on file     Gets together: Not on file     Attends Orthodoxy service: Not on file     Active member of club or organization: Not on file     Attends  "meetings of clubs or organizations: Not on file     Relationship status: Not on file     Intimate partner violence:     Fear of current or ex partner: Not on file     Emotionally abused: Not on file     Physically abused: Not on file     Forced sexual activity: Not on file   Other Topics Concern     Parent/sibling w/ CABG, MI or angioplasty before 65F 55M? Not Asked   Social History Narrative     Not on file            Review of Systems:   Skin:  Positive for bruising   Eyes:  Positive for glaucoma  ENT:  Negative    Respiratory:  Negative    Cardiovascular:  Negative    Gastroenterology: Negative    Genitourinary:  Negative    Musculoskeletal:  Positive for arthritis  Neurologic:  Positive for local weakness;incoordination;numbness or tingling of feet  Psychiatric:  Negative    Heme/Lymph/Imm:  Positive for allergies  Endocrine:  Negative           Physical Exam:   Vitals: /78   Pulse 70   Ht 1.651 m (5' 5\")   Wt 60.1 kg (132 lb 8 oz)   SpO2 99%   BMI 22.05 kg/m     Wt Readings from Last 4 Encounters:   01/08/20 60.1 kg (132 lb 8 oz)   12/31/19 59.4 kg (131 lb)   12/18/19 57.6 kg (127 lb)   12/13/19 58 kg (127 lb 14.4 oz)     GEN: frail, elderly  HEENT:  Pupils equal, round. Sclerae nonicteric.   NECK: Supple, no masses appreciated.   C/V:  Irregularly irregular rate and rhythm, no murmur, rub or gallop.   RESP: Respirations are unlabored. Clear bilaterally. No wheezing or crackles.   GI: Abdomen distended, nontender.  EXTREM: trace to +1 bilateral lower extremity edema to about 3 inches above the ankle  NEURO: Alert and cooperative.  SKIN: Warm and dry.        Data:   ECHO 3/23/19  The left ventricle is normal in size.  The visual ejection fraction is estimated at 55-60%.  No regional wall motion abnormalities noted.  There is moderate (2+) mitral regurgitation.  There is moderate (2+) tricuspid regurgitation.  Severe (>55mmHg) pulmonary hypertension is present.  The rhythm was rapid atrial " fibrillation.    LIPID RESULTS:  Lab Results   Component Value Date    CHOL 160 10/12/2016    HDL 76 10/12/2016    LDL 71 10/12/2016    TRIG 66 10/12/2016    CHOLHDLRATIO 2.3 09/21/2015     LIVER ENZYME RESULTS:  Lab Results   Component Value Date    AST 20 08/07/2019    ALT 15 08/07/2019     CBC RESULTS:  Lab Results   Component Value Date    WBC 6.5 12/11/2019    RBC 4.13 12/11/2019    HGB 11.5 (L) 12/11/2019    HCT 35.8 12/11/2019    MCV 87 12/11/2019    MCH 27.8 12/11/2019    MCHC 32.1 12/11/2019    RDW 16.9 (H) 12/11/2019     12/11/2019     BMP RESULTS:  Lab Results   Component Value Date     01/08/2020    POTASSIUM 3.4 (L) 01/08/2020    CHLORIDE 103 01/08/2020    CO2 28 01/08/2020    ANIONGAP 11.4 01/08/2020     (H) 01/08/2020    BUN 15 01/08/2020    CR 1.09 01/08/2020    GFRESTIMATED 47 (L) 01/08/2020    GFRESTBLACK 57 (L) 01/08/2020    BRADLEY 9.9 01/08/2020      INR RESULTS:  Lab Results   Component Value Date    INR 2.70 (H) 12/31/2019    INR 2.7 (A) 12/18/2019            Medications     Current Outpatient Medications   Medication Sig Dispense Refill     acetaminophen (TYLENOL) 500 MG tablet Take 500 mg by mouth every 6 hours as needed for mild pain        acyclovir (ZOVIRAX) 400 MG tablet Take 1 tablet (400 mg) by mouth 2 times daily 60 tablet 3     Carboxymethylcellulose Sod PF (REFRESH PLUS) 0.5 % SOLN ophthalmic solution 1 drop 4 times daily as needed for dry eyes       dexamethasone (DECADRON) 4 MG tablet Take 20mg (5 tablets) by mouth on Wed 28 tablet 3     furosemide (LASIX) 20 MG tablet Take 3 (60mg) tablets daily, if weight > 137 pounds take an additional 20mg (1 tablet) 270 tablet 1     latanoprost (XALATAN) 0.005 % ophthalmic solution Place 1 drop into the right eye At Bedtime       lidocaine-prilocaine (EMLA) cream Apply to port site 1 hour prior to access 30 g 1     Multiple Vitamins-Minerals (DAILY MULTIVITAMIN) CAPS Take 1 tablet by mouth daily        nystatin (MYCOSTATIN)  996478 UNIT/GM external cream Apply topically 2 times daily 30 g 0     order for DME Equipment being ordered: Nebulizer with tubes/mask/acessories, use as directed 1 Device 0     oxyCODONE (ROXICODONE) 5 MG tablet Take half a pill every 12 hours as needed for pain. 30 tablet 0     polyethylene glycol (MIRALAX/GLYCOLAX) powder Take 17 g by mouth daily as needed for constipation        potassium chloride ER (K-DUR/KLOR-CON M) 20 MEQ CR tablet Take 1 tablet 2 x a day, with an additional 1 tablet on days when you take extra fursoemide 180 tablet 3     SIMBRINZA 1-0.2 % ophthalmic suspension Place 1 drop into the right eye 2 times daily 1 drop AM and PM  2     Sodium Fluoride (SF 5000 PLUS) 1.1 % CREA Apply to affected area 3 times daily       triamcinolone (KENALOG) 0.1 % external cream Apply topically 2 times daily 15 g 1     warfarin (COUMADIN) 4 MG tablet Take one tablet (4 mg) by mouth on Tuesdays, Thursdays  and Saturdays; take one-half tablet ( 2 mg) on all other days of the week.       warfarin ANTICOAGULANT (COUMADIN) 4 MG tablet Take 1 tab (4 mg) every Tue, Sat; and take 1/2 tab (2 mg) all other days of the week or as directed by your INR Clinic. 90 tablet 0     guaiFENesin-codeine (ROBITUSSIN AC) 100-10 MG/5ML solution Take 2.5-5 mLs by mouth nightly as needed for cough (Patient not taking: Reported on 1/8/2020) 100 mL 0          Past Medical History     Past Medical History:   Diagnosis Date     Abnormal CXR 2018    then ct done and not significant     Acute diastolic heart failure (H) 03/22/2019    nl ef, 2+mr and tr with severe pulm htn     Ascending aorta dilatation (H) 04/2016    on echo, mild, fu 7/18 4.0, slightly larger     Cancer, metastatic to bone (H)     due to myeloma     Colonic polyp 2008    adenomatous, fu 2013 tics only     Compression fracture 2016    multiple areas of spine     Dry eyes      Elevated MCV 2015    b12 and folic acid nl     HTN (hypertension) 2000    off meds for years     Lung  nodule 2018    on ct, 4mm, ct done for fu abnl cxr     Menorrhagia     hysteroscopy and d and c done     MGUS (monoclonal gammopathy of unknown significance)     eval by Dr. Roberts     Moderate to severe pulmonary hypertension (H) 2019    on echo     Multiple myeloma (H) 2016    dx  at [place], bone lesions seen on mri      OAB (overactive bladder)     Dr. Grullon     Osteoporosis     fu done  and stable, went off meds then, fu done ; has had gyn fu and added evista 2013 by gyn     Palpitations     nl echo, mildly dilated asc aorta     Paroxysmal atrial fibrillation (H) 2016    had palp and ziopatch showed it, echo nl lv fxn, mild mr and tr, added coum and toprol, toprol dose raised 16; hosp 2019 for this 3x, then had av solomon ablation and ppm      Sciatica of left side     Dr. Helen Ricardo      SVT (supraventricular tachycardia) (H)     on ziopatch     Thrombocytopenia (H)      Past Surgical History:   Procedure Laterality Date     BONE MARROW BIOPSY, BONE SPECIMEN, NEEDLE/TROCAR N/A 2016    Procedure: BIOPSY BONE MARROW;  Surgeon: Bryan Patel MD;  Location:  GI     CATARACT IOL, RT/LT        SECTION  , 1966     COLONOSCOPY  2013    Procedure: COLONOSCOPY;  COLONOSCOPY;  Surgeon: Steffany Rockwell MD;  Location:  GI     EP ABLATION AV NODE N/A 2019    Procedure: EP Ablation AV Node;  Surgeon: Lee Menchaca MD;  Location:  HEART CARDIAC CATH LAB     EP PACEMAKER N/A 2019    Procedure: EP Pacemaker;  Surgeon: Lee Menchaca MD;  Location:  HEART CARD[facility] CATH LAB     EXCISE EXOSTOSIS TIBIA / FIBULA  2014    Procedure: EXCISE EXOSTOSIS TIBIA / FIBULA;  Surgeon: Naila Pichardo MD;  Location:  SD     hysteroscopy and d and c      due to bleeding     left anle replacement       right ankle surgery       Family History   Problem Relation Age of Onset     Heart  Disease Father      C.A.D. Mother      Cerebrovascular Disease Brother      Family History Negative Sister      Family History Negative Sister      Family History Negative Brother             Allergies   Blood transfusion related (informational only) and Penicillin [penicillins]        [facility]  Pager: [phone]

## 2020-01-16 DIAGNOSIS — R05.9 COUGH: ICD-10-CM

## 2020-01-16 RX ORDER — CODEINE PHOSPHATE AND GUAIFENESIN 10; 100 MG/5ML; MG/5ML
.5-1 SOLUTION ORAL
Qty: 100 ML | Refills: 0 | Status: CANCELLED | OUTPATIENT
Start: 2020-01-16

## 2020-01-16 NOTE — TELEPHONE ENCOUNTER
"  Robitussin AC      Last Written Prescription Date:  12/10/19  Last Fill Quantity: 100mL,   # refills: 0  Note on med list 1/8/2020 - \"not taking\"     Last Office Visit: 12/31/19   Future Office visit:    Next 5 appointments (look out 90 days)    Mar 18, 2020  1:00 PM CDT  Return Visit with Shayne Roberts MD  [facility] ([facility]) [address], [address]  [facility] [address] [phone]         Called patient - at night helps her sleep and in AM - states she does not have a cough any more. Discussed with patient this med is not meant for insomnia treatment. Briefly discussed sleep hygiene and recommended OV if insomnia is persistent - pt agreeable to this     Aruna ROBERSON RN    "

## 2020-01-22 ENCOUNTER — INFUSION THERAPY VISIT (OUTPATIENT)
Dept: INFUSION THERAPY | Facility: CLINIC | Age: 85
End: 2020-01-22
Attending: INTERNAL MEDICINE
Payer: MEDICARE

## 2020-01-22 DIAGNOSIS — Z95.828 PORT-A-CATH IN PLACE: Primary | ICD-10-CM

## 2020-01-22 PROCEDURE — 25000128 H RX IP 250 OP 636: Performed by: INTERNAL MEDICINE

## 2020-01-22 PROCEDURE — 96523 IRRIG DRUG DELIVERY DEVICE: CPT

## 2020-01-22 RX ORDER — HEPARIN SODIUM (PORCINE) LOCK FLUSH IV SOLN 100 UNIT/ML 100 UNIT/ML
500 SOLUTION INTRAVENOUS EVERY 8 HOURS
Status: CANCELLED
Start: 2020-01-22

## 2020-01-22 RX ORDER — HEPARIN SODIUM (PORCINE) LOCK FLUSH IV SOLN 100 UNIT/ML 100 UNIT/ML
500 SOLUTION INTRAVENOUS EVERY 8 HOURS
Status: DISCONTINUED | OUTPATIENT
Start: 2020-01-22 | End: 2020-01-22 | Stop reason: HOSPADM

## 2020-01-22 RX ADMIN — HEPARIN SODIUM (PORCINE) LOCK FLUSH IV SOLN 100 UNIT/ML 500 UNITS: 100 SOLUTION at 12:39

## 2020-01-22 NOTE — PROGRESS NOTES
Infusion Nursing Note:  Amira Arreola presents today for port flush.    Patient seen by provider today: No    Note: Patent has not had treatment for awhile so patient reports to feeling well.  Reports newer mid back pain that seems to come and go.  This RN reminded to call this clinic if pain increases and she needs direction with pain relief.  Patient verbalized understanding.    Intravenous Access:  Port      Treatment Conditions:  Not Applicable.      Post Infusion Assessment:  Patient tolerated infusion without incident.  Site patent and intact, free from redness, edema or discomfort.  No evidence of extravasations.  Access discontinued per protocol.    Discharge Plan:   Patient declined prescription refills.  Discharge instructions reviewed with: Patient and Family.  Patient and/or family verbalized understanding of discharge instructions and all questions answered.  Copy of AVS reviewed with patient and/or family.  Patient will return 3/4/20 for lab draw for next appointment.  Patient discharged in stable condition accompanied by: son.  Departure Mode: Wheelchair.    Libby Nguyễn, RN, RN

## 2020-02-10 ENCOUNTER — TELEPHONE (OUTPATIENT)
Dept: FAMILY MEDICINE | Facility: CLINIC | Age: 85
End: 2020-02-10

## 2020-02-10 NOTE — TELEPHONE ENCOUNTER
[facility] [phone] called to request to recheck Thursday 2/13/20 instead of 2/11/20 as patient spouse is due on Thursday and they only want one visit this week.  Last INR was 6 weeks ago, 2.7 and patient is stable with no concerns of bleeding or bruising.  VO given to recheck on Thursday 2/13/20.    Suyapa Walton RN  Anticoagulation Clinic

## 2020-02-13 ENCOUNTER — TELEPHONE (OUTPATIENT)
Dept: FAMILY MEDICINE | Facility: CLINIC | Age: 85
End: 2020-02-13

## 2020-02-13 ENCOUNTER — DOCUMENTATION ONLY (OUTPATIENT)
Dept: CARE COORDINATION | Facility: CLINIC | Age: 85
End: 2020-02-13

## 2020-02-13 DIAGNOSIS — Z79.01 LONG TERM CURRENT USE OF ANTICOAGULANT THERAPY: ICD-10-CM

## 2020-02-13 LAB — INR PPP: 2.4 (ref 0.9–1.1)

## 2020-02-13 NOTE — TELEPHONE ENCOUNTER
ANTICOAGULATION MANAGEMENT     Patient Name:  Amira Arreola  Date:  2020    ASSESSMENT /SUBJECTIVE:      Today's INR result of 2.4 is therapeutic. Goal INR of 2.0-3.0      Warfarin dose taken: Warfarin taken as previously instructed    Diet: No new diet changes affecting INR    Medication changes/ interactions: No new medications/supplements affecting INR    Previous INR: Therapeutic     S/S of bleeding or thromboembolism: No    New injury or illness:  No    Upcoming surgery, procedure or cardioversion:  No    Additional findings: None    PLAN:    Spoke with Rajiv ([facility] RN) regarding INR result and instructed:     Warfarin Dosing Instructions: Continue your current warfarin dose of 2mg Tues/Sat; 4mg all other days    Instructed patient to follow up no later than: 6 weeks  Orders given to  Homecare nurse/facility to recheck    Education provided: No    Rajiv verbalizes understanding and agrees to warfarin dosing plan.    Instructed to call the Anticoagulation Clinic for any changes, questions or concerns. (#[phone])        OBJECTIVE:  INR   Date Value Ref Range Status   2020 2.4 (A) 0.90 - 1.10 Final             Anticoagulation Summary  As of 2020    INR goal:   2.0-3.0   TTR:   78.4 % (10.6 mo)   INR used for dosin.4 (2020)   Warfarin maintenance plan:   2 mg (4 mg x 0.5) every Tue, Sat; 4 mg (4 mg x 1) all other days   Full warfarin instructions:   2 mg every Tue, Sat; 4 mg all other days   Weekly warfarin total:   24 mg   Plan last modified:   Ines Dorman RN (2019)   Next INR check:   3/26/2020   Priority:   Maintenance   Target end date:   Indefinite    Indications    Long term current use of anticoagulant therapy [Z79.01]  Atrial fibrillation (H) [I48.91] (Resolved) [I48.91]             Anticoagulation Episode Summary     INR check location:       Preferred lab:   EXTERNAL LAB    Send INR reminders to:   DANTE SUMNER    Comments:    Rajiv HENSLEY [facility]  [phone] private pay nursing visits to check INR      Anticoagulation Care Providers     Provider Role Specialty Phone number    Addy Frias MD [facility] Internal Medicine [phone]

## 2020-02-13 NOTE — PROGRESS NOTES
[facility] now requests orders and shares plan of care/discharge summaries for some patients through [employer].  Please REPLY TO THIS MESSAGE OR ROUTE BACK TO THE AUTHOR in order to give authorization for orders when needed.  This is considered a verbal order, you will still receive a faxed copy of orders for signature.  Thank you for your assistance in improving collaboration for our patients.    ORDER  HN visits/hours as directed by patient.      MD SUMMARY/PLAN OF CARE  SUMMARY TO MD  SITUATION: Recertification assessment completed, currently recieving as needed/directed private pay nurse visits for ongoing INR management.  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 /64, 0/10 pain, T 97.2 temporal, R 16 nonlabored, HR 70, O2 99% on RA.  She has chronic back pain that is managed with PRN meds and rest and reports her pain is present with activity and later in day.  She is up with use of walker, denies any recent falls, is a high fall risk.  She needs assistance to complete personal cares for safety due to taxing effort and increased pain with activity. Skin is intact Reports good appetite.  Weights have been stable today 137.8lbs and checks daily.  Lungs clear thoughout, nonpitting edema to BLEs wears compression stockings and elecates legs throughout the day for managment.  Her daughter manages and sets up medications for her and reports she is compliant.  INR today was 2.4 via fingerstick.  Has private hired caregiver that comes 2x/week to assist with showering for safety, homemaking.   Her son resides at home and daughter lives next door and both assist with errands, taking to appointments, and groceries.  Her port is flushed monthly in clinic along with labwork.  Nurse visits as needed/directed  to assist with INR checks in home and general assessment, payer is private pay/billed to LTC benefit.

## 2020-03-04 ENCOUNTER — ALLIED HEALTH/NURSE VISIT (OUTPATIENT)
Dept: INFUSION THERAPY | Facility: CLINIC | Age: 85
End: 2020-03-04
Attending: INTERNAL MEDICINE
Payer: MEDICARE

## 2020-03-04 ENCOUNTER — HOSPITAL ENCOUNTER (OUTPATIENT)
Facility: CLINIC | Age: 85
Setting detail: SPECIMEN
Discharge: HOME | End: 2020-03-04
Attending: INTERNAL MEDICINE | Admitting: INTERNAL MEDICINE
Payer: MEDICARE

## 2020-03-04 VITALS
OXYGEN SATURATION: 98 % | SYSTOLIC BLOOD PRESSURE: 119 MMHG | TEMPERATURE: 97.9 F | HEART RATE: 72 BPM | RESPIRATION RATE: 18 BRPM | DIASTOLIC BLOOD PRESSURE: 77 MMHG

## 2020-03-04 DIAGNOSIS — C90.00 MULTIPLE MYELOMA NOT HAVING ACHIEVED REMISSION (H): Primary | ICD-10-CM

## 2020-03-04 DIAGNOSIS — Z95.828 PORT-A-CATH IN PLACE: ICD-10-CM

## 2020-03-04 LAB
ANION GAP SERPL CALCULATED.3IONS-SCNC: 6 MMOL/L (ref 3–14)
BUN SERPL-MCNC: 21 MG/DL (ref 7–30)
CALCIUM SERPL-MCNC: 9.5 MG/DL (ref 8.5–10.1)
CHLORIDE SERPL-SCNC: 108 MMOL/L (ref 94–109)
CO2 SERPL-SCNC: 24 MMOL/L (ref 20–32)
CREAT SERPL-MCNC: 0.79 MG/DL (ref 0.52–1.04)
ERYTHROCYTE [DISTWIDTH] IN BLOOD BY AUTOMATED COUNT: 15.5 % (ref 10–15)
GFR SERPL CREATININE-BSD FRML MDRD: 67 ML/MIN/{1.73_M2}
GLUCOSE SERPL-MCNC: 130 MG/DL (ref 70–99)
HCT VFR BLD AUTO: 35.6 % (ref 35–47)
HGB BLD-MCNC: 11.1 G/DL (ref 11.7–15.7)
MCH RBC QN AUTO: 26.7 PG (ref 26.5–33)
MCHC RBC AUTO-ENTMCNC: 31.2 G/DL (ref 31.5–36.5)
MCV RBC AUTO: 86 FL (ref 78–100)
PLATELET # BLD AUTO: 194 10E9/L (ref 150–450)
POTASSIUM SERPL-SCNC: 3.8 MMOL/L (ref 3.4–5.3)
RBC # BLD AUTO: 4.15 10E12/L (ref 3.8–5.2)
SODIUM SERPL-SCNC: 138 MMOL/L (ref 133–144)
WBC # BLD AUTO: 8.2 10E9/L (ref 4–11)

## 2020-03-04 PROCEDURE — 00000402 ZZHCL STATISTIC TOTAL PROTEIN: Performed by: INTERNAL MEDICINE

## 2020-03-04 PROCEDURE — 84165 PROTEIN E-PHORESIS SERUM: CPT | Performed by: INTERNAL MEDICINE

## 2020-03-04 PROCEDURE — 83883 ASSAY NEPHELOMETRY NOT SPEC: CPT | Performed by: INTERNAL MEDICINE

## 2020-03-04 PROCEDURE — 25000128 H RX IP 250 OP 636: Performed by: INTERNAL MEDICINE

## 2020-03-04 PROCEDURE — 36591 DRAW BLOOD OFF VENOUS DEVICE: CPT

## 2020-03-04 PROCEDURE — 85027 COMPLETE CBC AUTOMATED: CPT | Performed by: INTERNAL MEDICINE

## 2020-03-04 PROCEDURE — 80048 BASIC METABOLIC PNL TOTAL CA: CPT | Performed by: INTERNAL MEDICINE

## 2020-03-04 RX ORDER — HEPARIN SODIUM (PORCINE) LOCK FLUSH IV SOLN 100 UNIT/ML 100 UNIT/ML
500 SOLUTION INTRAVENOUS EVERY 8 HOURS
Status: CANCELLED
Start: 2020-03-04

## 2020-03-04 RX ORDER — HEPARIN SODIUM (PORCINE) LOCK FLUSH IV SOLN 100 UNIT/ML 100 UNIT/ML
500 SOLUTION INTRAVENOUS EVERY 8 HOURS
Status: DISCONTINUED | OUTPATIENT
Start: 2020-03-04 | End: 2020-03-04 | Stop reason: HOSPADM

## 2020-03-04 RX ADMIN — HEPARIN SODIUM (PORCINE) LOCK FLUSH IV SOLN 100 UNIT/ML 500 UNITS: 100 SOLUTION at 13:11

## 2020-03-04 ASSESSMENT — PAIN SCALES - GENERAL: PAINLEVEL: MODERATE PAIN (4)

## 2020-03-04 NOTE — PROGRESS NOTES
Nursing Note:  Amira Arreola presents today for port draw and heparin flush.    Patient seen by provider today: No   present during visit today: Not Applicable.    Note: N/A.    Intravenous Access:  Labs drawn without difficulty.  Implanted Port.    Discharge Plan:   Patient was sent home and will return on 3/18/20 for next appointment.    Andie Rosas RN

## 2020-03-05 LAB
ALBUMIN SERPL ELPH-MCNC: 3.7 G/DL (ref 3.7–5.1)
ALPHA1 GLOB SERPL ELPH-MCNC: 0.3 G/DL (ref 0.2–0.4)
ALPHA2 GLOB SERPL ELPH-MCNC: 0.8 G/DL (ref 0.5–0.9)
B-GLOBULIN SERPL ELPH-MCNC: 0.7 G/DL (ref 0.6–1)
GAMMA GLOB SERPL ELPH-MCNC: 0.3 G/DL (ref 0.7–1.6)
KAPPA LC UR-MCNC: 4.75 MG/DL (ref 0.33–1.94)
KAPPA LC/LAMBDA SER: 17.59 {RATIO} (ref 0.26–1.65)
LAMBDA LC SERPL-MCNC: 0.27 MG/DL (ref 0.57–2.63)
M PROTEIN SERPL ELPH-MCNC: 0 G/DL
PROT PATTERN SERPL ELPH-IMP: ABNORMAL

## 2020-03-09 ENCOUNTER — ANCILLARY PROCEDURE (OUTPATIENT)
Dept: CARDIOLOGY | Facility: CLINIC | Age: 85
End: 2020-03-09
Attending: INTERNAL MEDICINE
Payer: MEDICARE

## 2020-03-09 DIAGNOSIS — Z95.0 CARDIAC PACEMAKER IN SITU: ICD-10-CM

## 2020-03-09 PROCEDURE — 93294 REM INTERROG EVL PM/LDLS PM: CPT | Performed by: INTERNAL MEDICINE

## 2020-03-09 PROCEDURE — 93296 REM INTERROG EVL PM/IDS: CPT | Performed by: INTERNAL MEDICINE

## 2020-03-14 ENCOUNTER — PATIENT OUTREACH (OUTPATIENT)
Dept: ONCOLOGY | Facility: CLINIC | Age: 85
End: 2020-03-14

## 2020-03-14 DIAGNOSIS — I48.0 PAROXYSMAL ATRIAL FIBRILLATION (H): ICD-10-CM

## 2020-03-14 DIAGNOSIS — Z79.01 LONG TERM CURRENT USE OF ANTICOAGULANT THERAPY: ICD-10-CM

## 2020-03-14 NOTE — TELEPHONE ENCOUNTER
"Last Written Prescription Date:  10/22/19  Last Fill Quantity: 90 tablet,  # refills: 0   Last office visit: 12/31/2019 with prescribing provider:  Altagracia   Future Office Visit:   Next 5 appointments (look out 90 days)    Mar 18, 2020  1:00 PM CDT  Return Visit with Shayne Roberts MD  [facility] ([facility]) [address]  [facility] [place] [address]  [phone]         Requested Prescriptions   Pending Prescriptions Disp Refills     warfarin ANTICOAGULANT (COUMADIN) 4 MG tablet [Pharmacy Med Name: WARFARIN SOD 4MG TABLETS (BLUE)] 90 tablet 0     Sig: TAKE 1 TABLET BY MOUTH EVERY TUESDAY AND SATURDAY AND 1/2 TABLET ALL OTHER DAYS OF THE WEEK OR AS DIRECTED BY YOUR INR CLINIC       Vitamin K Antagonists Failed - 3/14/2020  3:57 AM        Failed - INR is within goal in the past 6 weeks     Confirm INR is within goal in the past 6 weeks.     Recent Labs   Lab Test 02/13/20   INR 2.4*                       Passed - Recent (12 mo) or future (30 days) visit within the authorizing provider's specialty     Patient has had an office visit with the authorizing provider or a provider within the authorizing providers department within the previous 12 mos or has a future within next 30 days. See \"Patient Info\" tab in inbasket, or \"Choose Columns\" in Meds & Orders section of the refill encounter.              Passed - Medication is active on med list        Passed - Patient is 18 years of age or older        Passed - Patient is not pregnant        Passed - No positive pregnancy on file in past 12 months             "

## 2020-03-14 NOTE — PROGRESS NOTES
Patient is currently scheduled for an appointment at [facility] in [place] on 03/18.  Called patient to review current visitor restrictions and complete COVID-19 Visitor Wellness Screening Tool.     Due to the recent public health concerns and in an effort to keep our patients and staff safe and healthy, we are implementing a screening process for the patients and visitors that come to our clinic.      At this time, we are limiting visitors to only one essential visitor.  An essential visitor is a legal guardian or agent who must be present for health care decisions.  This visitor must also pass our screening process, or they will be required to leave and wait elsewhere.      I am going to ask you a few questions, please answer yes or no.     Do you have a:  Fever?  No  Cough?  No  Shortness of breath?  No  Skin rash?  No    Within the past 14 days, have you been in contact with someone who:   Is currently being ruled out for COVID-19 (novel coronavirus)?  No   Has tested positive for COVID-19?  No   Has symptoms of a respiratory illness (fever, cough, shortness of breath)?  No    Have you recently traveled to an area with COVID-19 cases (refer to CDC Coronavirus webpage for COVID-19 areas)?  No    Within the past 3 weeks, have you been exposed to the following:   Pertussis?  No   Chickenpox?  No   Measles?  No    Patient PASSED the screening assessment.  Patient instructed to come to the clinic as planned for appointment and to call the clinic right away if anything changes in their health status.    Gabriela Lee RN

## 2020-03-15 RX ORDER — WARFARIN SODIUM 4 MG/1
TABLET ORAL
Qty: 90 TABLET | Refills: 0 | Status: SHIPPED | OUTPATIENT
Start: 2020-03-15 | End: 2020-06-15

## 2020-03-18 ENCOUNTER — PATIENT OUTREACH (OUTPATIENT)
Dept: ONCOLOGY | Facility: CLINIC | Age: 85
End: 2020-03-18

## 2020-03-18 ENCOUNTER — TELEPHONE (OUTPATIENT)
Dept: FAMILY MEDICINE | Facility: CLINIC | Age: 85
End: 2020-03-18

## 2020-03-18 DIAGNOSIS — I50.9 ACUTE ON CHRONIC CONGESTIVE HEART FAILURE, UNSPECIFIED HEART FAILURE TYPE (H): ICD-10-CM

## 2020-03-18 DIAGNOSIS — C90.00 MULTIPLE MYELOMA NOT HAVING ACHIEVED REMISSION (H): ICD-10-CM

## 2020-03-18 RX ORDER — ACYCLOVIR 400 MG/1
400 TABLET ORAL
Qty: 60 TABLET | Refills: 3 | Status: SHIPPED | OUTPATIENT
Start: 2020-03-18 | End: 2020-06-29

## 2020-03-18 NOTE — TELEPHONE ENCOUNTER
Home care nurse calling to verify if okay to recheck INR two days sooner per patient request. Agreed with this visit change to 3/24.

## 2020-03-22 LAB
MDC_IDC_EPISODE_DTM: NORMAL
MDC_IDC_EPISODE_ID: NORMAL
MDC_IDC_EPISODE_TYPE: NORMAL
MDC_IDC_LEAD_IMPLANT_DT: NORMAL
MDC_IDC_LEAD_LOCATION: NORMAL
MDC_IDC_LEAD_LOCATION_DETAIL_1: NORMAL
MDC_IDC_LEAD_MFG: NORMAL
MDC_IDC_LEAD_MODEL: NORMAL
MDC_IDC_LEAD_POLARITY_TYPE: NORMAL
MDC_IDC_LEAD_SERIAL: NORMAL
MDC_IDC_MSMT_BATTERY_DTM: NORMAL
MDC_IDC_MSMT_BATTERY_REMAINING_LONGEVITY: 114 MO
MDC_IDC_MSMT_BATTERY_REMAINING_PERCENTAGE: 100 %
MDC_IDC_MSMT_BATTERY_STATUS: NORMAL
MDC_IDC_MSMT_LEADCHNL_RV_IMPEDANCE_VALUE: 821 OHM
MDC_IDC_MSMT_LEADCHNL_RV_PACING_THRESHOLD_AMPLITUDE: 0.9 V
MDC_IDC_MSMT_LEADCHNL_RV_PACING_THRESHOLD_PULSEWIDTH: 0.4 MS
MDC_IDC_PG_IMPLANT_DTM: NORMAL
MDC_IDC_PG_MFG: NORMAL
MDC_IDC_PG_MODEL: NORMAL
MDC_IDC_PG_SERIAL: NORMAL
MDC_IDC_PG_TYPE: NORMAL
MDC_IDC_SESS_CLINIC_NAME: NORMAL
MDC_IDC_SESS_DTM: NORMAL
MDC_IDC_SESS_TYPE: NORMAL
MDC_IDC_SET_BRADY_LOWRATE: 70 {BEATS}/MIN
MDC_IDC_SET_BRADY_MAX_SENSOR_RATE: 130 {BEATS}/MIN
MDC_IDC_SET_BRADY_MODE: NORMAL
MDC_IDC_SET_LEADCHNL_RV_PACING_AMPLITUDE: 1.4 V
MDC_IDC_SET_LEADCHNL_RV_PACING_CAPTURE_MODE: NORMAL
MDC_IDC_SET_LEADCHNL_RV_PACING_POLARITY: NORMAL
MDC_IDC_SET_LEADCHNL_RV_PACING_PULSEWIDTH: 0.4 MS
MDC_IDC_SET_LEADCHNL_RV_SENSING_ADAPTATION_MODE: NORMAL
MDC_IDC_SET_LEADCHNL_RV_SENSING_POLARITY: NORMAL
MDC_IDC_SET_LEADCHNL_RV_SENSING_SENSITIVITY: 2.5 MV
MDC_IDC_SET_ZONE_DETECTION_INTERVAL: 375 MS
MDC_IDC_SET_ZONE_TYPE: NORMAL
MDC_IDC_SET_ZONE_VENDOR_TYPE: NORMAL
MDC_IDC_STAT_BRADY_DTM_END: NORMAL
MDC_IDC_STAT_BRADY_DTM_START: NORMAL
MDC_IDC_STAT_BRADY_RV_PERCENT_PACED: 98 %
MDC_IDC_STAT_EPISODE_RECENT_COUNT: 0
MDC_IDC_STAT_EPISODE_RECENT_COUNT_DTM_END: NORMAL
MDC_IDC_STAT_EPISODE_RECENT_COUNT_DTM_START: NORMAL
MDC_IDC_STAT_EPISODE_TYPE: NORMAL
MDC_IDC_STAT_EPISODE_VENDOR_TYPE: NORMAL
MDC_IDC_STAT_EPISODE_VENDOR_TYPE: NORMAL

## 2020-03-24 ENCOUNTER — TELEPHONE (OUTPATIENT)
Dept: FAMILY MEDICINE | Facility: CLINIC | Age: 85
End: 2020-03-24

## 2020-03-24 DIAGNOSIS — Z79.01 LONG TERM CURRENT USE OF ANTICOAGULANT THERAPY: ICD-10-CM

## 2020-03-24 LAB — INR PPP: 2 (ref 0.9–1.1)

## 2020-03-24 NOTE — TELEPHONE ENCOUNTER
ANTICOAGULATION MANAGEMENT     Patient Name:  Amira Arreola  Date:  3/24/2020    ASSESSMENT /SUBJECTIVE:      Today's INR result of 2.0 is therapeutic. Goal INR of 2.0-3.0      Warfarin dose taken: Warfarin taken as previously instructed    Diet: No new diet changes affecting INR    Medication changes/ interactions: No new medications/supplements affecting INR    Previous INR: Therapeutic     S/S of bleeding or thromboembolism: No    New injury or illness:  No    Upcoming surgery, procedure or cardioversion:  No    Additional findings: none      PLAN:    Spoke with Misty home care nurse regarding INR result and instructed:     Warfarin Dosing Instructions: Continue your current warfarin dose of 2 mg every Tue, Sat; 4 mg all other days    Instructed patient to follow up no later than: 6 weeks  Orders given to  Homecare nurse/facility to recheck    Education provided: Yes: significance of INR result      Jessica verbalizes understanding and agrees to warfarin dosing plan.    Instructed to call the Anticoagulation Clinic for any changes, questions or concerns. (#[phone])        OBJECTIVE:  INR   Date Value Ref Range Status   2020 2.0 (A) 0.90 - 1.10 Final             Anticoagulation Summary  As of 3/24/2020    INR goal:   2.0-3.0   TTR:   82.6 % (10.7 mo)   INR used for dosin.0 (3/24/2020)   Warfarin maintenance plan:   2 mg (4 mg x 0.5) every Tue, Sat; 4 mg (4 mg x 1) all other days   Full warfarin instructions:   2 mg every Tue, Sat; 4 mg all other days   Weekly warfarin total:   24 mg   Plan last modified:   Ines Dorman RN (2019)   Next INR check:   2020   Priority:   Maintenance   Target end date:   Indefinite    Indications    Long term current use of anticoagulant therapy [Z79.01]  Atrial fibrillation (H) [I48.91] (Resolved) [I48.91]             Anticoagulation Episode Summary     INR check location:       Preferred lab:   EXTERNAL LAB    Send INR reminders to:    DANTE SUMNER    Comments:    Rajiv RN [facility] [phone] private pay nursing visits to check INR      Anticoagulation Care Providers     Provider Role Specialty Phone number    Addy Frias MD [facility] Internal Medicine [phone]

## 2020-04-06 ENCOUNTER — VIRTUAL VISIT (OUTPATIENT)
Dept: CARDIOLOGY | Facility: CLINIC | Age: 85
End: 2020-04-06
Attending: NURSE PRACTITIONER
Payer: MEDICARE

## 2020-04-06 VITALS — WEIGHT: 138 LBS | BODY MASS INDEX: 22.96 KG/M2

## 2020-04-06 DIAGNOSIS — I34.0 MITRAL VALVE INSUFFICIENCY, UNSPECIFIED ETIOLOGY: ICD-10-CM

## 2020-04-06 DIAGNOSIS — I10 BENIGN ESSENTIAL HYPERTENSION: ICD-10-CM

## 2020-04-06 DIAGNOSIS — I48.91 ATRIAL FIBRILLATION, UNSPECIFIED TYPE (H): ICD-10-CM

## 2020-04-06 DIAGNOSIS — I27.20 PULMONARY HYPERTENSION (H): ICD-10-CM

## 2020-04-06 DIAGNOSIS — I07.1 TRICUSPID VALVE INSUFFICIENCY, UNSPECIFIED ETIOLOGY: ICD-10-CM

## 2020-04-06 DIAGNOSIS — I50.32 CHRONIC DIASTOLIC HEART FAILURE (H): Primary | ICD-10-CM

## 2020-04-06 PROCEDURE — 99442 ZZC PHYSICIAN TELEPHONE EVALUATION 11-20 MIN: CPT | Performed by: NURSE PRACTITIONER

## 2020-04-06 NOTE — PATIENT INSTRUCTIONS
1. No medication changes  2. CORE follow up with Elisabeth in August, sooner as needed.   3. Continue to monitor weight daily at home, notify CORE clinic of weight gain or worsening swelling/breathing.   4. ontinue furosemide 60mg daily with additional dose of furosemide 20mg as needed for weight > 137#.    5. CORE clinic phone number, please call with any questions/concerns [phone]

## 2020-04-06 NOTE — LETTER
"4/6/2020    Addy Frias MD  [address] [facility] [address]  [place] [address]    RE: Amira Arreola       Dear Colleague,    I had the pleasure of seeing Amira Arreola in the [facility].    Amira Arreola is a 87 year old female who is being evaluated via a billable telephone visit.      The patient has been notified of following:     \"This telephone visit will be conducted via a call between you and your physician/provider. We have found that certain health care needs can be provided without the need for a physical exam.  This service lets us provide the care you need with a short phone conversation.  If a prescription is necessary we can send it directly to your pharmacy.  If lab work is needed we can place an order for that and you can then stop by our lab to have the test done at a later time.    If during the course of the call the physician/provider feels a telephone visit is not appropriate, you will not be charged for this service.\"     Patient has given verbal consent for Telephone visit?  Yes    Amira Arreola complains of    Chief Complaint   Patient presents with     Heart Failure     3mo f/u CORE appt     This visit is being conducted as a virtual visit due to the emphasis on mitigation of the COVID-19 virus pandemic. The clinician has decided that the risk of an in-office visit outweighs the benefit for this patient.     I have reviewed and updated the patient's Past Medical History, Social History, Family History and Medication List.    ALLERGIES  Blood transfusion related (informational only) and Penicillin [penicillins]      Reason for visit: CORE follow up    HPI  Amira Arreola is a very pleasant 86 year old female with a history of HFpEF, chronic atrial fibrillation, hypertension, mitral regurgitation, tricuspid regurgitation and hx of multiple myeloma mets to bone (dx 2016, currently being treated with Daratumab, Velcade, and Dexamethasone every 2 weeks). "     Patient with 3 hospitalizations this year, most recent hospitalizations 7/3/19-7/7/19 presenting from the cardiology office with hypotension and altered mental status felt to be related to hypovolemia and hypercalcemia. Patient was in atrial fibrillation with RVR. Serial troponin negative. Unable to tolerate rate control medications due to hypotension s/p AV solomon ablation with PPM 7/5/19. S/p thoracentesis 7/5 250cc removed, transudative. Palliative care met with patient/family during hospital stay, code status changed to DNR/DNI but family still wishing to receive restorative care. Discharge weight 131#. Patient discharged to [place]. 6/28/19-7/1/19 related to atrial fibrillation with RVR, acute on chronic diastolic heart failure and bilateral pleural effusions. BNP 13K on admission, 2K at time of discharge. Diltiazem increased to 180mg/day. Prior to this patient was hospitalized in March, see below for details.      Echocardiogram completed 3/23/19 LVEF 55-60%, no wall motion abnormalities, moderate mitral regurgitation, moderate tricuspid regurgitation, and severe pulmonary hypertension.     Patient has been following closely in CORE clinic for the past year, last seen January 2020 at which point she was doing well. Lower extremity edema had improved with the addition of 20mg additional furosemide PRN for weight gain over 137#. She was continued on furosemide 60mg daily with additional PRN dose of furosemide 20mg for weight > 137#.      Patient reports feeling good. Monitoring weight daily at home. Weight today 138#. States it has been staying about the same, unable to recall any other readings. She is unable to recall if she has been taking additional PRN furosemide for weight > 137#. Family helps with medication management. Reports her lower extremity edema has been improved from the past, she also notes home health care aide has noted improving lower extremity edema. Denies shortness of breath at rest.  Denies exertional dyspnea with activities around the house. Using the walker for support around the house. Sleeping good. Denies orthopnea/PND. Even sleeping with less pillows at night. Denies chest pain or chest tightness. Denies dizziness, lightheadedness or other presyncopal symptoms. Denies syncope. Denies falls.      Labs from 3/4/20 show stable kidney function and electrolytes. Hemoglobin 11.1 (stable). No blood pressure or heart rate at home.     Appetite good. Dannie, son, prepares meals for Amira. Patient/family notes this is typically higher sodium food items but it is the best that Dannie can do. Patient and  getting  services daily, helping with bathing and household tasks. Her children are talking to them through the window. Denies alcohol or tobacco use.     Review Of Systems  Skin: negative  Eyes: negative  Ears/Nose/Throat: negative  Respiratory: No shortness of breath  Cardiovascular: negative for and chest pain  Gastrointestinal: negative  Genitourinary: negative  Musculoskeletal: back pain  Neurologic: negative  Psychiatric: negative  Hematologic/Lymphatic/Immunologic: follows with Dr. Roberts, chemotherapy still on hold.       Assessment/plan  1.  Chronic diastolic heart failure/HFpEF - Echocardiogram completed 3/23/19 LVEF 55-60%, no wall motion abnormalities, moderate mitral regurgitation, moderate tricuspid regurgitation, and severe pulmonary hypertension. Reported weight today 138#, she is unable to recall other weights and then states that this is what her weight is around, so unclear if this is an exact weight from this morning. HF symptoms are controlled, she notes improved lower extremity edema. Dietary indiscretions with sodium make volume status challenging. Most meals she is eating are picked up from [employer] or [email or web address] by their son. Labs 3/4 today show stable kidney function, creatinine 0.79. Stable electrolytes, potassium 3.8. Overall patient is doing well from a HF  standpoint, will continue furosemide 60mg daily with additional PRN dose of furosemide 20mg for weight > 137#.                - NYHA class III, stage C              - Dry weight : ~ 130#              - Diuretic regimen : continue furosemide to 60mg daily, if weight is > 137# advised to take an additional 20mg daily. Patient unable to recall if she has been taking additional furosemide, recommend continuation of additional PRN furosemide.               - Continue potassium to 20meq BID and additional 20meq on days when she takes an extra furosemide.               - Aldosterone antagonist : none              - Blood pressure : controlled              - Reinforced HF education, reviewed low sodium diet, reviewed foods to avoid.      2. Chronic atrial fibrillation - s/p AV solomon ablation with PPM implantation 7/5/19, stable, significant improvement in symptoms since ablation and heart rate control achieved.               - [id number] score 5 (HTN, HF, age, female)              - Continue warfarin for thromboembolic prophylaxis.      3. Moderate mitral regurgitation, moderate tricuspid regurgitation               - Will consider repeat echocardiogram in the future if worsening symptoms, currently won't change any management clinically.  Last echo 3/2019.     4. Severe pulmonary hypertension - likely secondary to HFpEF and likely improved with diuresis.              - Will consider repeat echocardiogram in the future, currently won't .      5. Multiple myeloma with mets to bone - follows with Dr. Roberts              - Chemo therapy continues to be on hold, continue follow up with Dr. Roberts.         Changes today: none    Follow up plan:   1. CORE follow up in August with BMP prior. Sooner if needed.       Phone call duration: 14 minutes      DAYSI Arellano [facility]  Pager: [phone]        Thank you for allowing me to participate in the care of your patient.    Sincerely,     Elisabeth CHAN  DAYSI Means CNP     [facility]

## 2020-04-06 NOTE — PROGRESS NOTES
"Amira Arreola is a 87 year old female who is being evaluated via a billable telephone visit.      The patient has been notified of following:     \"This telephone visit will be conducted via a call between you and your physician/provider. We have found that certain health care needs can be provided without the need for a physical exam.  This service lets us provide the care you need with a short phone conversation.  If a prescription is necessary we can send it directly to your pharmacy.  If lab work is needed we can place an order for that and you can then stop by our lab to have the test done at a later time.    If during the course of the call the physician/provider feels a telephone visit is not appropriate, you will not be charged for this service.\"     Patient has given verbal consent for Telephone visit?  Yes    Amira Arreola complains of    Chief Complaint   Patient presents with     Heart Failure     3mo f/u CORE appt     This visit is being conducted as a virtual visit due to the emphasis on mitigation of the COVID-19 virus pandemic. The clinician has decided that the risk of an in-office visit outweighs the benefit for this patient.     I have reviewed and updated the patient's Past Medical History, Social History, Family History and Medication List.    ALLERGIES  Blood transfusion related (informational only) and Penicillin [penicillins]      Reason for visit: CORE follow up    HPI  Amira Arreola is a very pleasant 86 year old female with a history of HFpEF, chronic atrial fibrillation, hypertension, mitral regurgitation, tricuspid regurgitation and hx of multiple myeloma mets to bone (dx 2016, currently being treated with Daratumab, Velcade, and Dexamethasone every 2 weeks).     Patient with 3 hospitalizations this year, most recent hospitalizations 7/3/19-7/7/19 presenting from the cardiology office with hypotension and altered mental status felt to be related to hypovolemia and hypercalcemia. " Patient was in atrial fibrillation with RVR. Serial troponin negative. Unable to tolerate rate control medications due to hypotension s/p AV solomon ablation with PPM 7/5/19. S/p thoracentesis 7/5 250cc removed, transudative. Palliative care met with patient/family during hospital stay, code status changed to DNR/DNI but family still wishing to receive restorative care. Discharge weight 131#. Patient discharged to [facility]. 6/28/19-7/1/19 related to atrial fibrillation with RVR, acute on chronic diastolic heart failure and bilateral pleural effusions. BNP 13K on admission, 2K at time of discharge. Diltiazem increased to 180mg/day. Prior to this patient was hospitalized in March, see below for details.      Echocardiogram completed 3/23/19 LVEF 55-60%, no wall motion abnormalities, moderate mitral regurgitation, moderate tricuspid regurgitation, and severe pulmonary hypertension.     Patient has been following closely in [facility] clinic for the past year, last seen January 2020 at which point she was doing well. Lower extremity edema had improved with the addition of 20mg additional furosemide PRN for weight gain over 137#. She was continued on furosemide 60mg daily with additional PRN dose of furosemide 20mg for weight > 137#.      Patient reports feeling good. Monitoring weight daily at home. Weight today 138#. States it has been staying about the same, unable to recall any other readings. She is unable to recall if she has been taking additional PRN furosemide for weight > 137#. Family helps with medication management. Reports her lower extremity edema has been improved from the past, she also notes home health care aide has noted improving lower extremity edema. Denies shortness of breath at rest. Denies exertional dyspnea with activities around the house. Using the walker for support around the house. Sleeping good. Denies orthopnea/PND. Even sleeping with less pillows at night. Denies chest pain or chest tightness.  Denies dizziness, lightheadedness or other presyncopal symptoms. Denies syncope. Denies falls.      Labs from 3/4/20 show stable kidney function and electrolytes. Hemoglobin 11.1 (stable). No blood pressure or heart rate at home.     Appetite good. Dannie, son, prepares meals for Amira. Patient/family notes this is typically higher sodium food items but it is the best that Dannie can do. Patient and  getting  services daily, helping with bathing and household tasks. Her children are talking to them through the window. Denies alcohol or tobacco use.     Review Of Systems  Skin: negative  Eyes: negative  Ears/Nose/Throat: negative  Respiratory: No shortness of breath  Cardiovascular: negative for and chest pain  Gastrointestinal: negative  Genitourinary: negative  Musculoskeletal: back pain  Neurologic: negative  Psychiatric: negative  Hematologic/Lymphatic/Immunologic: follows with Dr. Roberts, chemotherapy still on hold.       Assessment/plan  1.  Chronic diastolic heart failure/HFpEF - Echocardiogram completed 3/23/19 LVEF 55-60%, no wall motion abnormalities, moderate mitral regurgitation, moderate tricuspid regurgitation, and severe pulmonary hypertension. Reported weight today 138#, she is unable to recall other weights and then states that this is what her weight is around, so unclear if this is an exact weight from this morning. HF symptoms are controlled, she notes improved lower extremity edema. Dietary indiscretions with sodium make volume status challenging. Most meals she is eating are picked up from [employer] or [employer] by their son. Labs 3/4 today show stable kidney function, creatinine 0.79. Stable electrolytes, potassium 3.8. Overall patient is doing well from a HF standpoint, will continue furosemide 60mg daily with additional PRN dose of furosemide 20mg for weight > 137#.                - NYHA class III, stage C              - Dry weight : ~ 130#              - Diuretic regimen : continue  furosemide to 60mg daily, if weight is > 137# advised to take an additional 20mg daily. Patient unable to recall if she has been taking additional furosemide, recommend continuation of additional PRN furosemide.               - Continue potassium to 20meq BID and additional 20meq on days when she takes an extra furosemide.               - Aldosterone antagonist : none              - Blood pressure : controlled              - Reinforced HF education, reviewed low sodium diet, reviewed foods to avoid.      2. Chronic atrial fibrillation - s/p AV solomon ablation with PPM implantation 7/5/19, stable, significant improvement in symptoms since ablation and heart rate control achieved.               - [id number] score 5 (HTN, HF, age, female)              - Continue warfarin for thromboembolic prophylaxis.      3. Moderate mitral regurgitation, moderate tricuspid regurgitation               - Will consider repeat echocardiogram in the future if worsening symptoms, currently won't change any management clinically.  Last echo 3/2019.     4. Severe pulmonary hypertension - likely secondary to HFpEF and likely improved with diuresis.              - Will consider repeat echocardiogram in the future, currently won't .      5. Multiple myeloma with mets to bone - follows with Dr. Roberts              - Chemo therapy continues to be on hold, continue follow up with Dr. Roberts.         Changes today: none    Follow up plan:   1. CORE follow up in August with BMP prior. Sooner if needed.       Phone call duration: 14 minutes      DAYSI Arellano [facility]  Pager: [phone]

## 2020-04-15 ENCOUNTER — DOCUMENTATION ONLY (OUTPATIENT)
Dept: CARE COORDINATION | Facility: CLINIC | Age: 85
End: 2020-04-15

## 2020-04-15 NOTE — PROGRESS NOTES
[facility] now requests orders and shares plan of care/discharge summaries for some patients through [employer].  Please REPLY TO THIS MESSAGE OR ROUTE BACK TO THE AUTHOR in order to give authorization for orders when needed.  This is considered a verbal order, you will still receive a faxed copy of orders for signature.  Thank you for your assistance in improving collaboration for our patients.    ORDER  HN visits as directed for INR checks, general assessment.  INR recheck on 5/5. Current dose: Patient to take Warfarin 2 mg Tuesday and Saturday, 4 mg all other days.    MD SUMMARY/PLAN OF CARE  RECERTIFICATION SUMMARY  SITUATION: Recertification assessment completed, currently recieving as needed/directed private pay nurse visits for ongoing INR management.  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 PORT a cath is currently flushed in clinic.  She is alert and oriented with noted forgetfullness.  She is up with use of walker, gait is slow and steady, denies any falls.  She needs assistance of one to complete ADLs/IADLs safely.  VS have been stable, today HR 70, R 16, SPO2 98% on RA, 0/10 chronic aches reports flares in afternoons but managed, /70, T 96.9 temporal.  Weight has been stable between 140-142lbs and is checked daily.  She has nonpitting edema to BLEs/feet, she wears compression stockings and elevates throughout the day for managment.  Her INRs have been stable, last INR on 3/24 was 2.0.  Denies any concerns with appetite.  She resides in single family home with spouse and son who is primary caretaker.  Her daughter lives next door and assists as needed and manages medications/sets up  medication in mediplanner and reports she is compliant with taking.  Has private hired caregiver 2x/week  to assisting with showering and light housekeeping.  Recommend nurse visits as needed/directed to assist with INR checks in home and general assessment, payer is private pay/billed to LTC benefit.

## 2020-04-16 NOTE — PROGRESS NOTES
Verbal order approval:    HN visits as directed for INR checks, general assessment.  INR recheck on 5/5. Current dose: Patient to take Warfarin 2 mg Tuesday and Saturday, 4 mg all other days

## 2020-05-05 ENCOUNTER — TELEPHONE (OUTPATIENT)
Dept: FAMILY MEDICINE | Facility: CLINIC | Age: 85
End: 2020-05-05

## 2020-05-05 DIAGNOSIS — Z79.01 LONG TERM CURRENT USE OF ANTICOAGULANT THERAPY: ICD-10-CM

## 2020-05-05 LAB — INR PPP: 2.3 (ref 0.9–1.1)

## 2020-06-10 ENCOUNTER — DOCUMENTATION ONLY (OUTPATIENT)
Dept: CARE COORDINATION | Facility: CLINIC | Age: 85
End: 2020-06-10

## 2020-06-10 NOTE — PROGRESS NOTES
[facility] now requests orders and shares plan of care/discharge summaries for some patients through [employer].  Please REPLY TO THIS MESSAGE OR ROUTE BACK TO THE AUTHOR in order to give authorization for orders when needed.  This is considered a verbal order, you will still receive a faxed copy of orders for signature.  Thank you for your assistance in improving collaboration for our patients.    ORDER  HN visits as directed, payer private pay  Next INR due 6/16 per previous order    Patient requesting podiatry referral sent.    RECERTIFICATION SUMMARY  SITUATION: Recertification assessment completed, currently recieving as needed private pay nurse visits for ongoing INR management.  She is alert and oriented with some forgetfullness.  She is up with use of walker, she tires easily and needs frequent rest periods.  She needs assistance in completing personal cares, bathing and requires rest after completion.  VS have been stable, today /60, R 16, T 96.6 axillary, SPO2 96% on room air, chronic pain that is managed and rating at 0/10 currently but increases later in day and activity.  She has chronic edema to BLEs/feet, has compression stockings that she intermittently wears and will elevate feet throughout the day.  Her weight was approximently 146lbs today, she has difficulty standing unassisted on scale and weight flucuated between 145-146, per patient records noted patient to have gained weights over past few weeks, noted weights to be within 2-3lbs each day and less than 5lbs per week.  She denies any breathing difficulties.  Daughter manages medications and sets up in mediplanner and is compliant with taking.  She has a PORT a Cath taht is managed in clinic.  She resides in single family home with spouse and son who is primary caretaker, her daughter lives next door and assists as needed.  She has a private hired caregiver assisting 2x/week. Her INRs are checked approximently monthly and  have been within range.   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   Recommendation: Continue nurse visits for ongoing INR management, assessment and education reinforcement with patient goal of remaining in home.

## 2020-06-13 DIAGNOSIS — Z79.01 LONG TERM CURRENT USE OF ANTICOAGULANT THERAPY: ICD-10-CM

## 2020-06-13 DIAGNOSIS — I48.0 PAROXYSMAL ATRIAL FIBRILLATION (H): ICD-10-CM

## 2020-06-15 DIAGNOSIS — C90.00 MULTIPLE MYELOMA NOT HAVING ACHIEVED REMISSION (H): Primary | ICD-10-CM

## 2020-06-15 RX ORDER — WARFARIN SODIUM 4 MG/1
TABLET ORAL
Qty: 90 TABLET | Refills: 0 | Status: SHIPPED | OUTPATIENT
Start: 2020-06-15 | End: 2020-08-14

## 2020-06-16 ENCOUNTER — ANTICOAGULATION THERAPY VISIT (OUTPATIENT)
Dept: FAMILY MEDICINE | Facility: CLINIC | Age: 85
End: 2020-06-16

## 2020-06-16 DIAGNOSIS — Z79.01 LONG TERM CURRENT USE OF ANTICOAGULANT THERAPY: ICD-10-CM

## 2020-06-16 LAB — INR PPP: 1.7 (ref 0.9–1.1)

## 2020-06-16 NOTE — PROGRESS NOTES
ANTICOAGULATION MANAGEMENT     Patient Name:  Amira Arreola  Date:  2020    ASSESSMENT /SUBJECTIVE:    Today's INR result of 1.7 is subtherapeutic. Goal INR of 2.0-3.0      Warfarin dose taken: Warfarin taken as previously instructed    Diet: No new diet changes affecting INR    Medication changes/ interactions: No new medications/supplements affecting INR    Previous INR: Therapeutic     S/S of bleeding or thromboembolism: No    New injury or illness: No    Upcoming surgery, procedure or cardioversion: No    Additional findings: None      PLAN:    Spoke with TRINA Vance Nurse regarding INR result and instructed:     Warfarin Dosing Instructions: Boost dose of 4 mg today, then resume normal dosing    Instructed patient to follow up no later than: 1 week  Orders given to  Homecare nurse/facility to recheck    Education provided: Monitoring for bleeding signs and symptoms and Monitoring for clotting signs and symptoms      Nurse Rajiv verbalizes understanding and agrees to warfarin dosing plan.    Instructed to call the Anticoagulation Clinic for any changes, questions or concerns. (#[phone])        OBJECTIVE:  INR   Date Value Ref Range Status   2020 1.7 (A) 0.90 - 1.10 Final         No question data found.  Anticoagulation Summary  As of 2020    INR goal:   2.0-3.0   TTR:   82.6 % (11.5 mo)   INR used for dosin.7! (2020)   Warfarin maintenance plan:   2 mg (4 mg x 0.5) every Tue, Sat; 4 mg (4 mg x 1) all other days   Full warfarin instructions:   : 4 mg; Otherwise 2 mg every Tue, Sat; 4 mg all other days   Weekly warfarin total:   24 mg   Plan last modified:   Ines Dorman RN (2019)   Next INR check:   2020   Priority:   Maintenance   Target end date:   Indefinite    Indications    Long term current use of anticoagulant therapy [Z79.01]  Atrial fibrillation (H) [I48.91] (Resolved) [I48.91]             Anticoagulation Episode Summary     INR check  location:       Preferred lab:   EXTERNAL LAB    Send INR reminders to:   DANTE SUMNER    Comments:    Rajiv RN [facility] [phone] private pay nursing visits to check INR      Anticoagulation Care Providers     Provider Role Specialty Phone number    Addy Frias MD [facility] Internal Medicine [phone]

## 2020-06-17 ENCOUNTER — ANCILLARY PROCEDURE (OUTPATIENT)
Dept: CARDIOLOGY | Facility: CLINIC | Age: 85
End: 2020-06-17
Attending: INTERNAL MEDICINE
Payer: MEDICARE

## 2020-06-17 DIAGNOSIS — Z95.0 CARDIAC PACEMAKER IN SITU: ICD-10-CM

## 2020-06-17 PROCEDURE — 93296 REM INTERROG EVL PM/IDS: CPT | Performed by: INTERNAL MEDICINE

## 2020-06-17 PROCEDURE — 93294 REM INTERROG EVL PM/LDLS PM: CPT | Performed by: INTERNAL MEDICINE

## 2020-06-23 ENCOUNTER — TELEPHONE (OUTPATIENT)
Dept: ONCOLOGY | Facility: CLINIC | Age: 85
End: 2020-06-23

## 2020-06-23 ENCOUNTER — TELEPHONE (OUTPATIENT)
Dept: FAMILY MEDICINE | Facility: CLINIC | Age: 85
End: 2020-06-23

## 2020-06-23 DIAGNOSIS — Z79.01 LONG TERM CURRENT USE OF ANTICOAGULANT THERAPY: ICD-10-CM

## 2020-06-23 LAB — INR PPP: 1.8 (ref 0.9–1.1)

## 2020-06-23 NOTE — TELEPHONE ENCOUNTER
ANTICOAGULATION MANAGEMENT     Patient Name:  Amira Arreola  Date:  2020    ASSESSMENT /SUBJECTIVE:    Today's INR result of 1.8 is subtherapeutic. Goal INR of 2.0-3.0      Warfarin dose taken: Warfarin taken as previously instructed    Diet: No new diet changes affecting INR    Medication changes/ interactions: No new medications/supplements affecting INR    Previous INR: Subtherapeutic     S/S of bleeding or thromboembolism: No    New injury or illness: No    Upcoming surgery, procedure or cardioversion: No    Additional findings: None      PLAN:    Spoke with Jessica, home care nurse, regarding INR result and instructed:     Warfarin Dosing Instructions: Continue your current warfarin dose 2 mg on  and 4 mg all other days    Instructed patient to follow up no later than: 1 week  Orders given to  Homecare nurse/facility to recheck    Education provided: None required      Jessica, home care, verbalizes understanding and agrees to warfarin dosing plan.    Instructed to call the Anticoagulation Clinic for any changes, questions or concerns. (#[phone])        OBJECTIVE:  INR   Date Value Ref Range Status   2020 1.8 (A) 0.90 - 1.10 Final             Anticoagulation Summary  As of 2020    INR goal:   2.0-3.0   TTR:   81.3 % (11.5 mo)   INR used for dosin.8! (2020)   Warfarin maintenance plan:   2 mg (4 mg x 0.5) every Sat; 4 mg (4 mg x 1) all other days   Full warfarin instructions:   2 mg every Sat; 4 mg all other days   Weekly warfarin total:   26 mg   Plan last modified:   Suyapa Walton RN (2020)   Next INR check:   2020   Priority:   Maintenance   Target end date:   Indefinite    Indications    Long term current use of anticoagulant therapy [Z79.01]  Atrial fibrillation (H) [I48.91] (Resolved) [I48.91]             Anticoagulation Episode Summary     INR check location:       Preferred lab:   EXTERNAL LAB    Send INR reminders to:   DANTE SUMNER    Comments:     Rajiv RN [facility] [phone] private pay nursing visits to check INR      Anticoagulation Care Providers     Provider Role Specialty Phone number    Addy Frias MD [facility] Internal Medicine [phone]

## 2020-06-23 NOTE — TELEPHONE ENCOUNTER
Reason for Call:  INR    Who is calling?  Home Care: Jessica    Phone number:  [phone]    Fax number:      Name of caller: Jessica    INR Value:  1.8    Are there any other concerns:  No    Can we leave a detailed message on this number? YES    Phone number patient can be reached at:       Call taken on 6/23/2020 at 8:16 AM by Mario Mark

## 2020-06-23 NOTE — TELEPHONE ENCOUNTER
"Patient is currently scheduled for an appointment at [facility] in [place].  Called patient to review current visitor restrictions and complete COVID-19 Patient Infection/Travel Screening Tool.     Due to the recent public health concerns around COVID-19 and in an effort to keep our patients and staff safe and healthy, we are implementing a screening process for the patients that come to our clinic.      I am going to ask you a few questions, please answer yes or no.  Your honesty about any symptoms is critical, as it keeps patients and staff healthy.      Do you have a:  Fever (or reported chills)?  No  New cough (started within the past 14 days)?  No  New shortness of breath (started within the past 14 days)?  No  Rash?  No    In the last month, have you been in contact with someone who was confirmed or suspected to have Coronavirus/COVID-19?  No    Have you traveled internationally in the last month?  No  If so, where?  N/A     I also wanted to let you know that to protect our patients from the flu and other common illnesses, [facility] enforce visitor restrictions year round, but due to the community spread of COVID-19 in [place], we are taking additional precautionary steps to ensure the health of our patients.  At this time, NO visitors are allowed on our hospital and clinic campuses.     Patient PASSED the screening assessment.    Patient instructed to come to the clinic as planned for their scheduled appointment and to call the clinic if any symptoms develop prior to their appointment.    \"Per CDC Guidelines, we are asking all patients that are coming into the building to wear a cloth covering that covers your mouth and nose.  You will be screened again at the entrance to the clinic for any Covid 19 symptoms. If you screen positive to any Covid 19 symptoms during our screening process you will be provided a surgical mask to wear during your time in the " "building.\"    \"COVID-19 is contagious and can be dangerous for our patients and staff.  Please send us a [employer] message or call our clinic before coming in if you feel any of the following symptoms: fever, cough, congestion, runny nose, sore throat, muscle aches and pains, or shortness of breath.  If you are already at our clinic, it is very important that you be honest about any symptoms you are experiencing to ensure your safety and that of other patients and staff who treat you.  If you do have symptoms, we will have a nurse and/or provider asses you to determine next steps.\"    Libertad Pritchard, CMA on 6/23/2020 at 2:52 PM      "

## 2020-06-23 NOTE — TELEPHONE ENCOUNTER
Anticoagulation Management    Unable to reach [facility] care nurse, today.    Today's INR result of 1.8 is subtherapeutic (goal INR of 2.0-3.0).  Result received from: Home Care    Follow up required to confirm warfarin dose taken and assess for changes    LMTCb     Plan: if no factors, increase maintence dose 8.6% to 2 mg on Saturday and 4 mg all other days, recheck one week      Anticoagulation clinic to follow up    Suyapa Walton RN

## 2020-06-24 ENCOUNTER — HOSPITAL ENCOUNTER (OUTPATIENT)
Facility: CLINIC | Age: 85
Setting detail: SPECIMEN
Discharge: HOME | End: 2020-06-24
Attending: INTERNAL MEDICINE | Admitting: INTERNAL MEDICINE
Payer: MEDICARE

## 2020-06-24 ENCOUNTER — ALLIED HEALTH/NURSE VISIT (OUTPATIENT)
Dept: INFUSION THERAPY | Facility: CLINIC | Age: 85
End: 2020-06-24
Attending: INTERNAL MEDICINE
Payer: MEDICARE

## 2020-06-24 DIAGNOSIS — Z95.828 PORT-A-CATH IN PLACE: ICD-10-CM

## 2020-06-24 DIAGNOSIS — C90.00 MULTIPLE MYELOMA NOT HAVING ACHIEVED REMISSION (H): Primary | ICD-10-CM

## 2020-06-24 LAB
ANION GAP SERPL CALCULATED.3IONS-SCNC: 5 MMOL/L (ref 3–14)
BASOPHILS # BLD AUTO: 0 10E9/L (ref 0–0.2)
BASOPHILS NFR BLD AUTO: 0.1 %
BUN SERPL-MCNC: 19 MG/DL (ref 7–30)
CALCIUM SERPL-MCNC: 9.1 MG/DL (ref 8.5–10.1)
CHLORIDE SERPL-SCNC: 110 MMOL/L (ref 94–109)
CO2 SERPL-SCNC: 24 MMOL/L (ref 20–32)
CREAT SERPL-MCNC: 0.85 MG/DL (ref 0.52–1.04)
DIFFERENTIAL METHOD BLD: ABNORMAL
EOSINOPHIL # BLD AUTO: 0.2 10E9/L (ref 0–0.7)
EOSINOPHIL NFR BLD AUTO: 2.8 %
ERYTHROCYTE [DISTWIDTH] IN BLOOD BY AUTOMATED COUNT: 16.7 % (ref 10–15)
GFR SERPL CREATININE-BSD FRML MDRD: 61 ML/MIN/{1.73_M2}
GLUCOSE SERPL-MCNC: 128 MG/DL (ref 70–99)
HCT VFR BLD AUTO: 34.9 % (ref 35–47)
HGB BLD-MCNC: 10.8 G/DL (ref 11.7–15.7)
IMM GRANULOCYTES # BLD: 0 10E9/L (ref 0–0.4)
IMM GRANULOCYTES NFR BLD: 0.1 %
LYMPHOCYTES # BLD AUTO: 0.8 10E9/L (ref 0.8–5.3)
LYMPHOCYTES NFR BLD AUTO: 12.2 %
MCH RBC QN AUTO: 25.8 PG (ref 26.5–33)
MCHC RBC AUTO-ENTMCNC: 30.9 G/DL (ref 31.5–36.5)
MCV RBC AUTO: 83 FL (ref 78–100)
MDC_IDC_EPISODE_DTM: NORMAL
MDC_IDC_EPISODE_DTM: NORMAL
MDC_IDC_EPISODE_ID: NORMAL
MDC_IDC_EPISODE_ID: NORMAL
MDC_IDC_EPISODE_TYPE: NORMAL
MDC_IDC_EPISODE_TYPE: NORMAL
MDC_IDC_LEAD_IMPLANT_DT: NORMAL
MDC_IDC_LEAD_LOCATION: NORMAL
MDC_IDC_LEAD_LOCATION_DETAIL_1: NORMAL
MDC_IDC_LEAD_MFG: NORMAL
MDC_IDC_LEAD_MODEL: NORMAL
MDC_IDC_LEAD_POLARITY_TYPE: NORMAL
MDC_IDC_LEAD_SERIAL: NORMAL
MDC_IDC_MSMT_BATTERY_DTM: NORMAL
MDC_IDC_MSMT_BATTERY_REMAINING_LONGEVITY: 114 MO
MDC_IDC_MSMT_BATTERY_REMAINING_PERCENTAGE: 100 %
MDC_IDC_MSMT_BATTERY_STATUS: NORMAL
MDC_IDC_MSMT_LEADCHNL_RV_IMPEDANCE_VALUE: 754 OHM
MDC_IDC_MSMT_LEADCHNL_RV_PACING_THRESHOLD_AMPLITUDE: 0.9 V
MDC_IDC_MSMT_LEADCHNL_RV_PACING_THRESHOLD_PULSEWIDTH: 0.4 MS
MDC_IDC_PG_IMPLANT_DTM: NORMAL
MDC_IDC_PG_MFG: NORMAL
MDC_IDC_PG_MODEL: NORMAL
MDC_IDC_PG_SERIAL: NORMAL
MDC_IDC_PG_TYPE: NORMAL
MDC_IDC_SESS_CLINIC_NAME: NORMAL
MDC_IDC_SESS_DTM: NORMAL
MDC_IDC_SESS_TYPE: NORMAL
MDC_IDC_SET_BRADY_LOWRATE: 70 {BEATS}/MIN
MDC_IDC_SET_BRADY_MAX_SENSOR_RATE: 130 {BEATS}/MIN
MDC_IDC_SET_BRADY_MODE: NORMAL
MDC_IDC_SET_LEADCHNL_RV_PACING_AMPLITUDE: 1.4 V
MDC_IDC_SET_LEADCHNL_RV_PACING_CAPTURE_MODE: NORMAL
MDC_IDC_SET_LEADCHNL_RV_PACING_POLARITY: NORMAL
MDC_IDC_SET_LEADCHNL_RV_PACING_PULSEWIDTH: 0.4 MS
MDC_IDC_SET_LEADCHNL_RV_SENSING_ADAPTATION_MODE: NORMAL
MDC_IDC_SET_LEADCHNL_RV_SENSING_POLARITY: NORMAL
MDC_IDC_SET_LEADCHNL_RV_SENSING_SENSITIVITY: 2.5 MV
MDC_IDC_SET_ZONE_DETECTION_INTERVAL: 375 MS
MDC_IDC_SET_ZONE_TYPE: NORMAL
MDC_IDC_SET_ZONE_VENDOR_TYPE: NORMAL
MDC_IDC_STAT_BRADY_DTM_END: NORMAL
MDC_IDC_STAT_BRADY_DTM_START: NORMAL
MDC_IDC_STAT_BRADY_RV_PERCENT_PACED: 99 %
MDC_IDC_STAT_EPISODE_RECENT_COUNT: 0
MDC_IDC_STAT_EPISODE_RECENT_COUNT_DTM_END: NORMAL
MDC_IDC_STAT_EPISODE_RECENT_COUNT_DTM_START: NORMAL
MDC_IDC_STAT_EPISODE_TYPE: NORMAL
MDC_IDC_STAT_EPISODE_VENDOR_TYPE: NORMAL
MDC_IDC_STAT_EPISODE_VENDOR_TYPE: NORMAL
MONOCYTES # BLD AUTO: 0.7 10E9/L (ref 0–1.3)
MONOCYTES NFR BLD AUTO: 9.7 %
NEUTROPHILS # BLD AUTO: 5.2 10E9/L (ref 1.6–8.3)
NEUTROPHILS NFR BLD AUTO: 75.1 %
NRBC # BLD AUTO: 0 10*3/UL
NRBC BLD AUTO-RTO: 0 /100
PLATELET # BLD AUTO: 188 10E9/L (ref 150–450)
POTASSIUM SERPL-SCNC: 3.6 MMOL/L (ref 3.4–5.3)
RBC # BLD AUTO: 4.19 10E12/L (ref 3.8–5.2)
SODIUM SERPL-SCNC: 139 MMOL/L (ref 133–144)
WBC # BLD AUTO: 6.9 10E9/L (ref 4–11)

## 2020-06-24 PROCEDURE — 25000128 H RX IP 250 OP 636: Performed by: INTERNAL MEDICINE

## 2020-06-24 PROCEDURE — 00000402 ZZHCL STATISTIC TOTAL PROTEIN: Performed by: INTERNAL MEDICINE

## 2020-06-24 PROCEDURE — 83883 ASSAY NEPHELOMETRY NOT SPEC: CPT | Performed by: INTERNAL MEDICINE

## 2020-06-24 PROCEDURE — 84165 PROTEIN E-PHORESIS SERUM: CPT | Performed by: INTERNAL MEDICINE

## 2020-06-24 PROCEDURE — 36591 DRAW BLOOD OFF VENOUS DEVICE: CPT

## 2020-06-24 PROCEDURE — 85025 COMPLETE CBC W/AUTO DIFF WBC: CPT | Performed by: INTERNAL MEDICINE

## 2020-06-24 PROCEDURE — 80048 BASIC METABOLIC PNL TOTAL CA: CPT | Performed by: INTERNAL MEDICINE

## 2020-06-24 RX ORDER — HEPARIN SODIUM (PORCINE) LOCK FLUSH IV SOLN 100 UNIT/ML 100 UNIT/ML
500 SOLUTION INTRAVENOUS EVERY 8 HOURS
Status: DISCONTINUED | OUTPATIENT
Start: 2020-06-24 | End: 2020-06-24 | Stop reason: HOSPADM

## 2020-06-24 RX ORDER — HEPARIN SODIUM (PORCINE) LOCK FLUSH IV SOLN 100 UNIT/ML 100 UNIT/ML
500 SOLUTION INTRAVENOUS EVERY 8 HOURS
Status: CANCELLED
Start: 2020-06-24

## 2020-06-24 RX ADMIN — HEPARIN SODIUM (PORCINE) LOCK FLUSH IV SOLN 100 UNIT/ML 500 UNITS: 100 SOLUTION at 09:08

## 2020-06-25 LAB
ALBUMIN SERPL ELPH-MCNC: 3.3 G/DL (ref 3.7–5.1)
ALPHA1 GLOB SERPL ELPH-MCNC: 0.3 G/DL (ref 0.2–0.4)
ALPHA2 GLOB SERPL ELPH-MCNC: 0.7 G/DL (ref 0.5–0.9)
B-GLOBULIN SERPL ELPH-MCNC: 0.6 G/DL (ref 0.6–1)
GAMMA GLOB SERPL ELPH-MCNC: 0.3 G/DL (ref 0.7–1.6)
KAPPA LC UR-MCNC: 6.56 MG/DL (ref 0.33–1.94)
KAPPA LC/LAMBDA SER: 17.26 {RATIO} (ref 0.26–1.65)
LAMBDA LC SERPL-MCNC: 0.38 MG/DL (ref 0.57–2.63)
M PROTEIN SERPL ELPH-MCNC: 0 G/DL
PROT PATTERN SERPL ELPH-IMP: ABNORMAL

## 2020-06-26 ENCOUNTER — TELEPHONE (OUTPATIENT)
Dept: ONCOLOGY | Facility: CLINIC | Age: 85
End: 2020-06-26

## 2020-06-26 NOTE — TELEPHONE ENCOUNTER
Patient's son Dannie called clinic inquiring if his mother's lab results are ok and if possible he would like her visit on Monday 6/29/20 to be changed to virtual. Message will be sent to Dr. Collins and his CCRN to see if this visit can be changed to virtual and instructed Dannie to call clinic Monday morning. Cynthia Pollack RN,BSN,OCN

## 2020-06-29 ENCOUNTER — VIRTUAL VISIT (OUTPATIENT)
Dept: ONCOLOGY | Facility: CLINIC | Age: 85
End: 2020-06-29
Attending: INTERNAL MEDICINE
Payer: MEDICARE

## 2020-06-29 ENCOUNTER — TELEPHONE (OUTPATIENT)
Dept: ONCOLOGY | Facility: CLINIC | Age: 85
End: 2020-06-29

## 2020-06-29 DIAGNOSIS — I50.9 ACUTE ON CHRONIC CONGESTIVE HEART FAILURE, UNSPECIFIED HEART FAILURE TYPE (H): ICD-10-CM

## 2020-06-29 DIAGNOSIS — C90.00 MULTIPLE MYELOMA NOT HAVING ACHIEVED REMISSION (H): ICD-10-CM

## 2020-06-29 PROCEDURE — 40001009 ZZH VIDEO/TELEPHONE VISIT; NO CHARGE

## 2020-06-29 PROCEDURE — 99442 ZZC PHYSICIAN TELEPHONE EVALUATION 11-20 MIN: CPT | Performed by: INTERNAL MEDICINE

## 2020-06-29 RX ORDER — ACYCLOVIR 400 MG/1
400 TABLET ORAL
Qty: 60 TABLET | Refills: 4 | Status: SHIPPED | OUTPATIENT
Start: 2020-06-29 | End: 2020-12-01

## 2020-06-29 RX ORDER — DEXAMETHASONE 4 MG/1
TABLET ORAL
Qty: 20 TABLET | Refills: 4 | Status: SHIPPED | OUTPATIENT
Start: 2020-06-29 | End: 2020-10-07

## 2020-06-29 NOTE — PROGRESS NOTES
"Amira Arreola is a 87 year old female who is being evaluated via a billable telephone visit.      The patient has been notified of following:     \"This telephone visit will be conducted via a call between you and your physician/provider. We have found that certain health care needs can be provided without the need for a physical exam.  This service lets us provide the care you need with a short phone conversation.  If a prescription is necessary we can send it directly to your pharmacy.  If lab work is needed we can place an order for that and you can then stop by our lab to have the test done at a later time.    Telephone visits are billed at different rates depending on your insurance coverage. During this emergency period, for some insurers they may be billed the same as an in-person visit.  Please reach out to your insurance provider with any questions.    If during the course of the call the physician/provider feels a telephone visit is not appropriate, you will not be charged for this service.\"    Patient has given verbal consent for Telephone visit?  Yes    What phone number would you like to be contacted at? [phone], son Dannie also included in appt    How would you like to obtain your AVS? Johan Mishra, [facility]    "

## 2020-06-29 NOTE — PATIENT INSTRUCTIONS
1. Continue weekly dexamethasone.  2. Continue acyclovir.  3. Oxycodone 2.5 mg as needed for pain.  4. Follow up in 3 months with labs. Labs to be done few days before appointment.  5. Patient/family will let us know about zometa.    Please call patient to schedule appts

## 2020-06-29 NOTE — PROGRESS NOTES
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     1. On 09/21/2015, WBC of 4.2, hemoglobin of 13.2 and platelets of 138.    -On 09/29/2015, SPEP does not reveal any M-spike.   -On 10/02/2015, JANET does not reveal any monoclonal protein.     -On 10/22/2015, urine immunofixation reveals monoclonal free kappa light chain.    2. On 05/11/2016, kappa light chain of 50, lambda light chain of 0.32 and ratio of kappa to lambda of 156.2.  3. Bone marrow biopsy on 05/25/2016 reveals 40-50% kappa light chain restricted plasma cells.  Cytogenetics is normal. FISH panel reveals translocation 11;14.    4. MRI of bones on 06/21/2016 and 06/22/2016 reveals myeloma lesions.  5. On 08/24/2016, she was started on revlimid with dexamethasone 20 mg weekly. She did not have any significant response to treatment.   6. Velcade and dexamethasone started on 03/21/2017.    7. Daratumumab added to velcade and dexamethasone on 05/31/2017.   -Velcade given every 14 days starting 08/01/2018.  -Treatment on hold. Last Velcade was on 06/05/2019.  Last daratumumab was on 05/22/2019. Dexamethasone continued.  8. On 05/22/2019, kappa free light chain of 1.21.      SUBJECTIVE:  Ms. Arreola is an 87-year-old female with kappa free light chain multiple myeloma.  We are holding her treatment and monitoring her.  Plan is to resume treatment when there is significant increase in her kappa free light chain.      Overall, she is doing well for her age.  She has fatigue which would go along with her age.  The patient has back pain.  She is taking pain medication, which helps.      REVIEW OF SYSTEMS:   No headache.  Some lightheadedness.  She has not fallen down recently.  No chest pain.  No shortness of breath.  No nausea or vomiting.  Appetite is fairly good.  No urinary or bowel complaints.  No bleeding.  There is some pedal edema.  It is not getting worse.  All other review  of systems negative.      PHYSICAL EXAMINATION:   GENERAL:  The patient was alert and oriented x 3.    This is a telephone visit.      LABORATORY DATA:  Reviewed.      ASSESSMENT:   1.  An 87-year-old female with kappa free light chain multiple myeloma which is slowly progressing.   2.  Fatigue secondary to her age and myeloma.   3.  Chronic back pain.      PLAN:   1.  The patient overall is doing well for her age.  She is almost 88 years old.  She still lives at home with help of her family.      Discussed regarding myeloma.  Labs were reviewed.  Calcium and creatinine are normal.  Hemoglobin is mildly low at 10.8.  SPEP does not reveal any monoclonal peak.  Her kappa light chain has increased to 6.56.  I explained to the patient that her myeloma is very slowly progressing.     Discussed regarding treatment.  At this time, I would not start her on any other treatment.  She is taking weekly dexamethasone 20 mg a week, which she will continue.  I told the patient that only if there is significant progression of disease or evidence of end-organ damage, then we will add any other agents.  She is agreeable for it.       3.  The patient is on acyclovir prophylactically.  She will continue.     4.  The patient will take oxycodone as needed.     5.  I will see her in 3 months' time.  Advised her to return sooner if she has worsening pain, weight loss, worsening weakness, shortness of breath, bleeding or any other concerns.         ITZ LOPEZ MD             D: 2020   T: 2020   MT: JENI      Name:     ALBERTO PARSON   MRN:      [id number]        Account:      [id number]   :      1932           Visit Date:   2020      Document: [id number]      Telephone visit for 11 minutes.

## 2020-06-29 NOTE — LETTER
"    6/29/2020         RE: Amira Arreola  [address]  [facility] [address]        Dear Colleague,    Thank you for referring your patient, Amira Arreola, to the [facility]. Please see a copy of my visit note below.    Amira Arreola is a 87 year old female who is being evaluated via a billable telephone visit.      The patient has been notified of following:     \"This telephone visit will be conducted via a call between you and your physician/provider. We have found that certain health care needs can be provided without the need for a physical exam.  This service lets us provide the care you need with a short phone conversation.  If a prescription is necessary we can send it directly to your pharmacy.  If lab work is needed we can place an order for that and you can then stop by our lab to have the test done at a later time.    Telephone visits are billed at different rates depending on your insurance coverage. During this emergency period, for some insurers they may be billed the same as an in-person visit.  Please reach out to your insurance provider with any questions.    If during the course of the call the physician/provider feels a telephone visit is not appropriate, you will not be charged for this service.\"    Patient has given verbal consent for Telephone visit?  Yes    What phone number would you like to be contacted at? [phone], son Dannie also included in appt    How would you like to obtain your AVS? 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     1. On 09/21/2015, WBC of 4.2, hemoglobin of 13.2 and platelets of 138.    -On 09/29/2015, SPEP does not reveal any M-spike.   -On 10/02/2015, JANET does not reveal any monoclonal protein.     -On 10/22/2015, urine immunofixation reveals monoclonal free kappa light chain.    2. On 05/11/2016, kappa " light chain of 50, lambda light chain of 0.32 and ratio of kappa to lambda of 156.2.  3. Bone marrow biopsy on 05/25/2016 reveals 40-50% kappa light chain restricted plasma cells.  Cytogenetics is normal. FISH panel reveals translocation 11;14.    4. MRI of bones on 06/21/2016 and 06/22/2016 reveals myeloma lesions.  5. On 08/24/2016, she was started on revlimid with dexamethasone 20 mg weekly. She did not have any significant response to treatment.   6. Velcade and dexamethasone started on 03/21/2017.    7. Daratumumab added to velcade and dexamethasone on 05/31/2017.   -Velcade given every 14 days starting 08/01/2018.  -Treatment on hold. Last Velcade was on 06/05/2019.  Last daratumumab was on 05/22/2019. Dexamethasone continued.  8. On 05/22/2019, kappa free light chain of 1.21.      SUBJECTIVE:  Ms. Arreola is an 87-year-old female with kappa free light chain multiple myeloma.  We are holding her treatment and monitoring her.  Plan is to resume treatment when there is significant increase in her kappa free light chain.      Overall, she is doing well for her age.  She has fatigue which would go along with her age.  The patient has back pain.  She is taking pain medication, which helps.      REVIEW OF SYSTEMS:   No headache.  Some lightheadedness.  She has not fallen down recently.  No chest pain.  No shortness of breath.  No nausea or vomiting.  Appetite is fairly good.  No urinary or bowel complaints.  No bleeding.  There is some pedal edema.  It is not getting worse.  All other review of systems negative.      PHYSICAL EXAMINATION:   GENERAL:  The patient was alert and oriented x 3.    This is a telephone visit.      LABORATORY DATA:  Reviewed.      ASSESSMENT:   1.  An 87-year-old female with kappa free light chain multiple myeloma which is slowly progressing.   2.  Fatigue secondary to her age and myeloma.   3.  Chronic back pain.      PLAN:   1.  The patient overall is doing well for her age.  She is almost  88 years old.  She still lives at home with help of her family.      Discussed regarding myeloma.  Labs were reviewed.  Calcium and creatinine are normal.  Hemoglobin is mildly low at 10.8.  SPEP does not reveal any monoclonal peak.  Her kappa light chain has increased to 6.56.  I explained to the patient that her myeloma is very slowly progressing.     Discussed regarding treatment.  At this time, I would not start her on any other treatment.  She is taking weekly dexamethasone 20 mg a week, which she will continue.  I told the patient that only if there is significant progression of disease or evidence of end-organ damage, then we will add any other agents.  She is agreeable for it.       3.  The patient is on acyclovir prophylactically.  She will continue.     4.  The patient will take oxycodone as needed.     5.  I will see her in 3 months' time.  Advised her to return sooner if she has worsening pain, weight loss, worsening weakness, shortness of breath, bleeding or any other concerns.         ITZ LOPEZ MD             D: 2020   T: 2020   MT: JENI      Name:     ALBERTO ARREOLA   MRN:      [id number]        Account:      [id number]   :      1932           Visit Date:   2020      Document: [id number]      Telephone visit for 11 minutes.    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     1. On 2015, WBC of 4.2, hemoglobin of 13.2 and platelets of 138.    -On 2015, SPEP does not reveal any M-spike.   -On 10/02/2015, JANET does not reveal any monoclonal protein.     -On 10/22/2015, urine immunofixation reveals monoclonal free kappa light chain.    2. On 2016, kappa light chain of 50, lambda light chain of 0.32 and ratio of kappa to lambda of 156.2.  3. Bone marrow biopsy on 2016 reveals 40-50% kappa light chain restricted plasma cells.  Cytogenetics is normal. FISH panel  reveals translocation 11;14.    4. MRI of bones on 06/21/2016 and 06/22/2016 reveals myeloma lesions.  5. On 08/24/2016, she was started on revlimid with dexamethasone 20 mg weekly. She did not have any significant response to treatment.   6. Velcade and dexamethasone started on 03/21/2017.    7. Daratumumab added to velcade and dexamethasone on 05/31/2017.   -Velcade given every 14 days starting 08/01/2018.  -Treatment on hold. Last Velcade was on 06/05/2019.  Last daratumumab was on 05/22/2019. Dexamethasone continued.  8. On 05/22/2019, kappa free light chain of 1.21.      SUBJECTIVE:  Ms. Arreola is an 87-year-old female with kappa free light chain multiple myeloma.  We are holding her treatment and monitoring her.  Plan is to resume treatment when there is significant increase in her kappa free light chain.      Overall, she is doing well for her age.  She has fatigue which would go along with her age.  The patient has back pain.  She is taking pain medication, which helps.      REVIEW OF SYSTEMS:   No headache.  Some lightheadedness.  She has not fallen down recently.  No chest pain.  No shortness of breath.  No nausea or vomiting.  Appetite is fairly good.  No urinary or bowel complaints.  No bleeding.  There is some pedal edema.  It is not getting worse.  All other review of systems negative.      PHYSICAL EXAMINATION:   GENERAL:  The patient was alert and oriented x 3.    This is a telephone visit.      LABORATORY DATA:  Reviewed.      ASSESSMENT:   1.  An 87-year-old female with kappa free light chain multiple myeloma which is slowly progressing.   2.  Fatigue secondary to her age and myeloma.   3.  Chronic back pain.      PLAN:   1.  The patient overall is doing well for her age.  She is almost 88 years old.  She still lives at home with help of her family.      Discussed regarding myeloma.  Labs were reviewed.  Calcium and creatinine are normal.  Hemoglobin is mildly low at 10.8.  SPEP does not reveal any  monoclonal peak.  Her kappa light chain has increased to 6.56.  I explained to the patient that her myeloma is very slowly progressing.     Discussed regarding treatment.  At this time, I would not start her on any other treatment.  She is taking weekly dexamethasone 20 mg a week, which she will continue.  I told the patient that only if there is significant progression of disease or evidence of end-organ damage, then we will add any other agents.  She is agreeable for it.       3.  The patient is on acyclovir prophylactically.  She will continue.     4.  The patient will take oxycodone as needed.     5.  I will see her in 3 months' time.  Advised her to return sooner if she has worsening pain, weight loss, worsening weakness, shortness of breath, bleeding or any other concerns.         ITZ LOEPZ MD             D: 2020   T: 2020   MT: JENI      Name:     ALBERTO PARSON   MRN:      [id number]        Account:      [id number]   :      1932           Visit Date:   2020      Document: [id number]      Telephone visit for 11 minutes.        Again, thank you for allowing me to participate in the care of your patient.        Sincerely,        Itz Lopez MD

## 2020-06-29 NOTE — LETTER
"    6/29/2020         RE: Amira Arreola  [address]  [facility] [address]        Dear Colleague,    Thank you for referring your patient, Amira Arreola, to the [facility]. Please see a copy of my visit note below.    Amira Arreola is a 87 year old female who is being evaluated via a billable telephone visit.      The patient has been notified of following:     \"This telephone visit will be conducted via a call between you and your physician/provider. We have found that certain health care needs can be provided without the need for a physical exam.  This service lets us provide the care you need with a short phone conversation.  If a prescription is necessary we can send it directly to your pharmacy.  If lab work is needed we can place an order for that and you can then stop by our lab to have the test done at a later time.    Telephone visits are billed at different rates depending on your insurance coverage. During this emergency period, for some insurers they may be billed the same as an in-person visit.  Please reach out to your insurance provider with any questions.    If during the course of the call the physician/provider feels a telephone visit is not appropriate, you will not be charged for this service.\"    Patient has given verbal consent for Telephone visit?  Yes    What phone number would you like to be contacted at? [phone], son Dannie also included in appt    How would you like to obtain your AVS? 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     1. On 09/21/2015, WBC of 4.2, hemoglobin of 13.2 and platelets of 138.    -On 09/29/2015, SPEP does not reveal any M-spike.   -On 10/02/2015, JANET does not reveal any monoclonal protein.     -On 10/22/2015, urine immunofixation reveals monoclonal free kappa light chain.    2. On 05/11/2016, kappa " light chain of 50, lambda light chain of 0.32 and ratio of kappa to lambda of 156.2.  3. Bone marrow biopsy on 05/25/2016 reveals 40-50% kappa light chain restricted plasma cells.  Cytogenetics is normal. FISH panel reveals translocation 11;14.    4. MRI of bones on 06/21/2016 and 06/22/2016 reveals myeloma lesions.  5. On 08/24/2016, she was started on revlimid with dexamethasone 20 mg weekly. She did not have any significant response to treatment.   6. Velcade and dexamethasone started on 03/21/2017.    7. Daratumumab added to velcade and dexamethasone on 05/31/2017.   -Velcade given every 14 days starting 08/01/2018.  -Treatment on hold. Last Velcade was on 06/05/2019.  Last daratumumab was on 05/22/2019. Dexamethasone continued.  8. On 05/22/2019, kappa free light chain of 1.21.      SUBJECTIVE:  Ms. Arreola is an 87-year-old female with kappa free light chain multiple myeloma.  We are holding her treatment and monitoring her.  Plan is to resume treatment when there is significant increase in her kappa free light chain.      Overall, she is doing well for her age.  She has fatigue which would go along with her age.  The patient has back pain.  She is taking pain medication, which helps.      REVIEW OF SYSTEMS:   No headache.  Some lightheadedness.  She has not fallen down recently.  No chest pain.  No shortness of breath.  No nausea or vomiting.  Appetite is fairly good.  No urinary or bowel complaints.  No bleeding.  There is some pedal edema.  It is not getting worse.  All other review of systems negative.      PHYSICAL EXAMINATION:   GENERAL:  The patient was alert and oriented x 3.    This is a telephone visit.      LABORATORY DATA:  Reviewed.      ASSESSMENT:   1.  An 87-year-old female with kappa free light chain multiple myeloma which is slowly progressing.   2.  Fatigue secondary to her age and myeloma.   3.  Chronic back pain.      PLAN:   1.  The patient overall is doing well for her age.  She is almost  88 years old.  She still lives at home with help of her family.      Discussed regarding myeloma.  Labs were reviewed.  Calcium and creatinine are normal.  Hemoglobin is mildly low at 10.8.  SPEP does not reveal any monoclonal peak.  Her kappa light chain has increased to 6.56.  I explained to the patient that her myeloma is very slowly progressing.     Discussed regarding treatment.  At this time, I would not start her on any other treatment.  She is taking weekly dexamethasone 20 mg a week, which she will continue.  I told the patient that only if there is significant progression of disease or evidence of end-organ damage, then we will add any other agents.  She is agreeable for it.       3.  The patient is on acyclovir prophylactically.  She will continue.     4.  The patient will take oxycodone as needed.     5.  I will see her in 3 months' time.  Advised her to return sooner if she has worsening pain, weight loss, worsening weakness, shortness of breath, bleeding or any other concerns.         ITZ LOPEZ MD             D: 2020   T: 2020   MT: JENI      Name:     ALBERTO ARREOLA   MRN:      [id number]        Account:      [id number]   :      1932           Visit Date:   2020      Document: [id number]      Telephone visit for 11 minutes.    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     1. On 2015, WBC of 4.2, hemoglobin of 13.2 and platelets of 138.    -On 2015, SPEP does not reveal any M-spike.   -On 10/02/2015, JANET does not reveal any monoclonal protein.     -On 10/22/2015, urine immunofixation reveals monoclonal free kappa light chain.    2. On 2016, kappa light chain of 50, lambda light chain of 0.32 and ratio of kappa to lambda of 156.2.  3. Bone marrow biopsy on 2016 reveals 40-50% kappa light chain restricted plasma cells.  Cytogenetics is normal. FISH panel  reveals translocation 11;14.    4. MRI of bones on 06/21/2016 and 06/22/2016 reveals myeloma lesions.  5. On 08/24/2016, she was started on revlimid with dexamethasone 20 mg weekly. She did not have any significant response to treatment.   6. Velcade and dexamethasone started on 03/21/2017.    7. Daratumumab added to velcade and dexamethasone on 05/31/2017.   -Velcade given every 14 days starting 08/01/2018.  -Treatment on hold. Last Velcade was on 06/05/2019.  Last daratumumab was on 05/22/2019. Dexamethasone continued.  8. On 05/22/2019, kappa free light chain of 1.21.      SUBJECTIVE:  Ms. Arreola is an 87-year-old female with kappa free light chain multiple myeloma.  We are holding her treatment and monitoring her.  Plan is to resume treatment when there is significant increase in her kappa free light chain.      Overall, she is doing well for her age.  She has fatigue which would go along with her age.  The patient has back pain.  She is taking pain medication, which helps.      REVIEW OF SYSTEMS:   No headache.  Some lightheadedness.  She has not fallen down recently.  No chest pain.  No shortness of breath.  No nausea or vomiting.  Appetite is fairly good.  No urinary or bowel complaints.  No bleeding.  There is some pedal edema.  It is not getting worse.  All other review of systems negative.      PHYSICAL EXAMINATION:   GENERAL:  The patient was alert and oriented x 3.    This is a telephone visit.      LABORATORY DATA:  Reviewed.      ASSESSMENT:   1.  An 87-year-old female with kappa free light chain multiple myeloma which is slowly progressing.   2.  Fatigue secondary to her age and myeloma.   3.  Chronic back pain.      PLAN:   1.  The patient overall is doing well for her age.  She is almost 88 years old.  She still lives at home with help of her family.      Discussed regarding myeloma.  Labs were reviewed.  Calcium and creatinine are normal.  Hemoglobin is mildly low at 10.8.  SPEP does not reveal any  monoclonal peak.  Her kappa light chain has increased to 6.56.  I explained to the patient that her myeloma is very slowly progressing.     Discussed regarding treatment.  At this time, I would not start her on any other treatment.  She is taking weekly dexamethasone 20 mg a week, which she will continue.  I told the patient that only if there is significant progression of disease or evidence of end-organ damage, then we will add any other agents.  She is agreeable for it.       3.  The patient is on acyclovir prophylactically.  She will continue.     4.  The patient will take oxycodone as needed.     5.  I will see her in 3 months' time.  Advised her to return sooner if she has worsening pain, weight loss, worsening weakness, shortness of breath, bleeding or any other concerns.         ITZ LOPEZ MD             D: 2020   T: 2020   MT: JENI      Name:     ALBERTO PARSON   MRN:      [id number]        Account:      [id number]   :      1932           Visit Date:   2020      Document: [id number]      Telephone visit for 11 minutes.        Again, thank you for allowing me to participate in the care of your patient.        Sincerely,        Itz Lopez MD

## 2020-06-30 ENCOUNTER — ANTICOAGULATION THERAPY VISIT (OUTPATIENT)
Dept: FAMILY MEDICINE | Facility: CLINIC | Age: 85
End: 2020-06-30

## 2020-06-30 DIAGNOSIS — Z79.01 LONG TERM CURRENT USE OF ANTICOAGULANT THERAPY: ICD-10-CM

## 2020-06-30 LAB — INR PPP: 2.2 (ref 0.9–1.1)

## 2020-06-30 NOTE — PROGRESS NOTES
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     1. On 09/21/2015, WBC of 4.2, hemoglobin of 13.2 and platelets of 138.    -On 09/29/2015, SPEP does not reveal any M-spike.   -On 10/02/2015, JANET does not reveal any monoclonal protein.     -On 10/22/2015, urine immunofixation reveals monoclonal free kappa light chain.    2. On 05/11/2016, kappa light chain of 50, lambda light chain of 0.32 and ratio of kappa to lambda of 156.2.  3. Bone marrow biopsy on 05/25/2016 reveals 40-50% kappa light chain restricted plasma cells.  Cytogenetics is normal. FISH panel reveals translocation 11;14.    4. MRI of bones on 06/21/2016 and 06/22/2016 reveals myeloma lesions.  5. On 08/24/2016, she was started on revlimid with dexamethasone 20 mg weekly. She did not have any significant response to treatment.   6. Velcade and dexamethasone started on 03/21/2017.    7. Daratumumab added to velcade and dexamethasone on 05/31/2017.   -Velcade given every 14 days starting 08/01/2018.  -Treatment on hold. Last Velcade was on 06/05/2019.  Last daratumumab was on 05/22/2019. Dexamethasone continued.  8. On 05/22/2019, kappa free light chain of 1.21.      SUBJECTIVE:  Ms. Arreola is an 87-year-old female with kappa free light chain multiple myeloma.  We are holding her treatment and monitoring her.  Plan is to resume treatment when there is significant increase in her kappa free light chain.      Overall, she is doing well for her age.  She has fatigue which would go along with her age.  The patient has back pain.  She is taking pain medication, which helps.      REVIEW OF SYSTEMS:   No headache.  Some lightheadedness.  She has not fallen down recently.  No chest pain.  No shortness of breath.  No nausea or vomiting.  Appetite is fairly good.  No urinary or bowel complaints.  No bleeding.  There is some pedal edema.  It is not getting worse.  All other review  of systems negative.      PHYSICAL EXAMINATION:   GENERAL:  The patient was alert and oriented x 3.    This is a telephone visit.      LABORATORY DATA:  Reviewed.      ASSESSMENT:   1.  An 87-year-old female with kappa free light chain multiple myeloma which is slowly progressing.   2.  Fatigue secondary to her age and myeloma.   3.  Chronic back pain.      PLAN:   1.  The patient overall is doing well for her age.  She is almost 88 years old.  She still lives at home with help of her family.      Discussed regarding myeloma.  Labs were reviewed.  Calcium and creatinine are normal.  Hemoglobin is mildly low at 10.8.  SPEP does not reveal any monoclonal peak.  Her kappa light chain has increased to 6.56.  I explained to the patient that her myeloma is very slowly progressing.     Discussed regarding treatment.  At this time, I would not start her on any other treatment.  She is taking weekly dexamethasone 20 mg a week, which she will continue.  I told the patient that only if there is significant progression of disease or evidence of end-organ damage, then we will add any other agents.  She is agreeable for it.       3.  The patient is on acyclovir prophylactically.  She will continue.     4.  The patient will take oxycodone as needed.     5.  I will see her in 3 months' time.  Advised her to return sooner if she has worsening pain, weight loss, worsening weakness, shortness of breath, bleeding or any other concerns.         ITZ LOPEZ MD             D: 2020   T: 2020   MT: JENI      Name:     ALBERTO PARSON   MRN:      [id number]        Account:      [id number]   :      1932           Visit Date:   2020      Document: [id number]      Telephone visit for 11 minutes.

## 2020-06-30 NOTE — PROGRESS NOTES
ANTICOAGULATION MANAGEMENT     Patient Name:  Amira Arreola  Date:  2020    ASSESSMENT /SUBJECTIVE:    Today's INR result of 2.2 is therapeutic. Goal INR of 2.0-3.0      Warfarin dose taken: Warfarin taken as previously instructed    Diet: No new diet changes affecting INR    Medication changes/ interactions: No new medications/supplements affecting INR    Previous INR: Subtherapeutic     S/S of bleeding or thromboembolism: No    New injury or illness: No    Upcoming surgery, procedure or cardioversion: No    Additional findings: None      PLAN:    Spoke with TRINA Vance Nurse regarding INR result and instructed:     Warfarin Dosing Instructions: Continue your current warfarin dose 2 mg Sat, 4 mg all other days    Instructed patient to follow up no later than: 1 week  Orders given to  Homecare nurse/facility to recheck    Education provided: Monitoring for bleeding signs and symptoms and Monitoring for clotting signs and symptoms      Nurse Rajiv verbalizes understanding and agrees to warfarin dosing plan.    Instructed to call the Anticoagulation Clinic for any changes, questions or concerns. (#[phone])        OBJECTIVE:  INR   Date Value Ref Range Status   2020 2.2 (A) 0.90 - 1.10 Final         No question data found.  Anticoagulation Summary  As of 2020    INR goal:   2.0-3.0   TTR:   81.8 % (11.6 mo)   INR used for dosin.2 (2020)   Warfarin maintenance plan:   2 mg (4 mg x 0.5) every Sat; 4 mg (4 mg x 1) all other days   Full warfarin instructions:   2 mg every Sat; 4 mg all other days   Weekly warfarin total:   26 mg   No change documented:   Tameka Perez RN   Plan last modified:   Suyapa Walton RN (2020)   Next INR check:   2020   Priority:   Maintenance   Target end date:   Indefinite    Indications    Long term current use of anticoagulant therapy [Z79.01]  Atrial fibrillation (H) [I48.91] (Resolved) [I48.91]             Anticoagulation Episode Summary      INR check location:       Preferred lab:   EXTERNAL LAB    Send INR reminders to:   DANTE SUMNER    Comments:    Rajiv RN [facility] [phone] private pay nursing visits to check INR      Anticoagulation Care Providers     Provider Role Specialty Phone number    Addy Frias MD [facility] Internal Medicine [phone]

## 2020-07-07 ENCOUNTER — TELEPHONE (OUTPATIENT)
Dept: FAMILY MEDICINE | Facility: CLINIC | Age: 85
End: 2020-07-07

## 2020-07-07 DIAGNOSIS — Z79.01 LONG TERM CURRENT USE OF ANTICOAGULANT THERAPY: ICD-10-CM

## 2020-07-07 LAB — INR PPP: 1.8 (ref 0.9–1.1)

## 2020-07-07 NOTE — TELEPHONE ENCOUNTER
Reason for Call:  INR    Who is calling?  Home Care:     Phone number:  [phone]    Fax number:  N/A    Name of caller: Jessica    INR Value:  1.8    Are there any other concerns:  No- needs verbal orders    Can we leave a detailed message on this number? YES    Phone number patient can be reached at: Other phone number:        Call taken on 7/7/2020 at 12:03 PM by Akanksha Pollock

## 2020-07-07 NOTE — TELEPHONE ENCOUNTER
ANTICOAGULATION MANAGEMENT     Patient Name:  Amira Arreola  Date:  2020    ASSESSMENT /SUBJECTIVE:    Today's INR result of 1.8 is subtherapeutic. Goal INR of 2.0-3.0      Warfarin dose taken: Warfarin taken as previously instructed    Diet: No new diet changes affecting INR    Medication changes/ interactions: No new medications/supplements affecting INR    Previous INR: Therapeutic     S/S of bleeding or thromboembolism: No    New injury or illness: No    Upcoming surgery, procedure or cardioversion: No    Additional findings: None      PLAN:    Spoke with Jessica, home care, regarding INR result and instructed:     Warfarin Dosing Instructions: Change your warfarin dose to 4 mg daily    Instructed patient to follow up no later than: 2 weeks  Orders given to  Homecare nurse/facility to recheck    Education provided: None required      Jessica, home care, verbalizes understanding and agrees to warfarin dosing plan.    Instructed to call the Anticoagulation Clinic for any changes, questions or concerns. (#[phone])        OBJECTIVE:  INR   Date Value Ref Range Status   2020 1.8 (A) 0.90 - 1.10 Final             Anticoagulation Summary  As of 2020    INR goal:   2.0-3.0   TTR:   81.2 % (11.8 mo)   INR used for dosin.8! (2020)   Warfarin maintenance plan:   2 mg (4 mg x 0.5) every Sat; 4 mg (4 mg x 1) all other days   Full warfarin instructions:   2 mg every Sat; 4 mg all other days   Weekly warfarin total:   26 mg   Plan last modified:   Suyapa Walton RN (2020)   Next INR check:      Priority:   Maintenance   Target end date:   Indefinite    Indications    Long term current use of anticoagulant therapy [Z79.01]  Atrial fibrillation (H) [I48.91] (Resolved) [I48.91]             Anticoagulation Episode Summary     INR check location:       Preferred lab:   EXTERNAL LAB    Send INR reminders to:   DANTE SUMNER    Comments:    Rajiv HENSLEY [facility] [phone] private pay nursing visits to  check INR      Anticoagulation Care Providers     Provider Role Specialty Phone number    Addy Frias MD Responsible Internal Medicine [phone]

## 2020-07-13 ENCOUNTER — DOCUMENTATION ONLY (OUTPATIENT)
Dept: FAMILY MEDICINE | Facility: CLINIC | Age: 85
End: 2020-07-13

## 2020-07-13 DIAGNOSIS — I48.0 PAROXYSMAL ATRIAL FIBRILLATION (H): Primary | ICD-10-CM

## 2020-07-13 NOTE — PROGRESS NOTES
ANTICOAGULATION MANAGEMENT      Amira Arreola due for annual renewal of referral to anticoagulation monitoring. Order pended for your review and signature.      ANTICOAGULATION SUMMARY      Warfarin indication(s)     Atrial fibrillation    Heart valve present?  NO       Current goal range   INR: 2.0-3.0     Goal appropriate for indication? Yes, INR 2-3 appropriate for hx of DVT, PE, hypercoagulable state, Afib, LVAD, or bileaflet AVR without risk factors     Current duration of therapy Indefinite/long term therapy   Time in Therapeutic Range (TTR)  (Goal > 60%) 81 %       Office visit with referring provider's group within last year yes on 12/31/19       Ameena Berg RN

## 2020-07-21 ENCOUNTER — ANTICOAGULATION THERAPY VISIT (OUTPATIENT)
Dept: FAMILY MEDICINE | Facility: CLINIC | Age: 85
End: 2020-07-21

## 2020-07-21 DIAGNOSIS — I48.0 PAROXYSMAL ATRIAL FIBRILLATION (H): ICD-10-CM

## 2020-07-21 DIAGNOSIS — Z79.01 LONG TERM CURRENT USE OF ANTICOAGULANT THERAPY: ICD-10-CM

## 2020-07-21 LAB — INR PPP: 2.6 (ref 0.9–1.1)

## 2020-07-21 NOTE — PROGRESS NOTES
ANTICOAGULATION MANAGEMENT     Patient Name:  Amira Arreola  Date:  2020    ASSESSMENT /SUBJECTIVE:    Today's INR result of 2.6 is therapeutic. Goal INR of 2.0-3.0      Warfarin dose taken: Warfarin taken as previously instructed    Diet: No new diet changes affecting INR    Medication changes/ interactions: No new medications/supplements affecting INR    Previous INR: Subtherapeutic     S/S of bleeding or thromboembolism: No    New injury or illness: No    Upcoming surgery, procedure or cardioversion: No    Additional findings: None      PLAN:    Spoke with jasper home care nurse, regarding INR result and instructed:     Warfarin Dosing Instructions: Continue your current warfarin dose 4 mg daily    Instructed patient to follow up no later than: 2 weeks  Orders given to  Homecare nurse/facility to recheck    Education provided: None required      melania Parks care, verbalizes understanding and agrees to warfarin dosing plan.    Instructed to call the Anticoagulation Clinic for any changes, questions or concerns. (#[phone])        Suyapa Walton RN      OBJECTIVE:  Recent labs: (last 7 days)     20   INR 2.6*         No question data found.  Anticoagulation Summary  As of 2020    INR goal:   2.0-3.0   TTR:   81.8 % (1 y)   INR used for dosin.6 (2020)   Warfarin maintenance plan:   4 mg (4 mg x 1) every day   Full warfarin instructions:   4 mg every day   Weekly warfarin total:   28 mg   Plan last modified:   Suyapa Walton RN (2020)   Next INR check:      Priority:   Maintenance   Target end date:   Indefinite    Indications    Long term current use of anticoagulant therapy [Z79.01]  Atrial fibrillation (H) [I48.91] (Resolved) [I48.91]  Paroxysmal atrial fibrillation (H) [I48.0]             Anticoagulation Episode Summary     INR check location:       Preferred lab:   EXTERNAL LAB    Send INR reminders to:   DANTE SUMNER    Comments:    Rajiv HENSLEY [facility] [phone] private  pay nursing visits to check INR      Anticoagulation Care Providers     Provider Role Specialty Phone number    Addy Frias MD Referring Internal Medicine [phone]

## 2020-08-04 ENCOUNTER — TELEPHONE (OUTPATIENT)
Dept: FAMILY MEDICINE | Facility: CLINIC | Age: 85
End: 2020-08-04

## 2020-08-04 DIAGNOSIS — I48.0 PAROXYSMAL ATRIAL FIBRILLATION (H): ICD-10-CM

## 2020-08-04 DIAGNOSIS — Z79.01 LONG TERM CURRENT USE OF ANTICOAGULANT THERAPY: ICD-10-CM

## 2020-08-04 LAB — INR PPP: 3.9 (ref 0.9–1.1)

## 2020-08-04 NOTE — TELEPHONE ENCOUNTER
Spoke with Jessica who reports no changes to patient's health or medications; a few less greens. No other findings.    Will have patient hold today's warfarin dose, return to maintenance and recheck in one week. Susan Valadez RN August 4, 2020 1:49 PM

## 2020-08-04 NOTE — TELEPHONE ENCOUNTER
Reason for Call:  INR    Who is calling?  [facility]     Phone number:  [phone]     Fax number:  N/A needs verbal orders    Name of caller: Jessica     INR Value:  3.9    Are there any other concerns:  No- next visit will be on 8/11 plan to recheck on that day?    Can we leave a detailed message on this number? YES    Phone number patient can be reached at: Other phone number:        Call taken on 8/4/2020 at 10:47 AM by Akanksha Pollock

## 2020-08-04 NOTE — TELEPHONE ENCOUNTER
Anticoagulation Management    Unable to reach Jessica [facility] RN today.    Today's INR result of 3.9 is supratherapeutic (goal INR of 2.0-3.0).  Result received from: Home Care    Follow up required to confirm warfarin dose taken and assess for changes    Left message to hold warfarin tonight. and to call clinic back for further details on high INR.     Anticoagulation clinic to follow up    Jenifer Henderson RN, BSN, PHN

## 2020-08-06 ENCOUNTER — NURSE TRIAGE (OUTPATIENT)
Dept: FAMILY MEDICINE | Facility: CLINIC | Age: 85
End: 2020-08-06

## 2020-08-06 NOTE — TELEPHONE ENCOUNTER
Reason for call:  Patient reporting a symptom    Symptom or request: back pain    Duration (how long have symptoms been present): 08.04.2020    Have you been treated for this before? No    Additional comments: Pt fell a couple weeks ago but this pain is new. Unsure if an OV is needed, or what to do    Phone Number patient's daughter Venice can be reached at:  [phone]  *C2C verified    Best Time:  any    Can we leave a detailed message on this number:  YES    Call taken on 8/6/2020 at 3:04 PM by Rosa Garcia

## 2020-08-07 ENCOUNTER — TELEPHONE (OUTPATIENT)
Dept: ONCOLOGY | Facility: CLINIC | Age: 85
End: 2020-08-07

## 2020-08-07 DIAGNOSIS — C90.00 MULTIPLE MYELOMA NOT HAVING ACHIEVED REMISSION (H): ICD-10-CM

## 2020-08-07 DIAGNOSIS — C79.51 CANCER, METASTATIC TO BONE (H): ICD-10-CM

## 2020-08-07 DIAGNOSIS — M54.6 RIGHT-SIDED THORACIC BACK PAIN: Primary | ICD-10-CM

## 2020-08-07 NOTE — TELEPHONE ENCOUNTER
There are no in clinic appointments available next week, unfortunately    Advised Pt/family to call back with worsening symptoms    UC/ER if severe over the weekend     Kat WHITTAKER RN

## 2020-08-07 NOTE — TELEPHONE ENCOUNTER
Left message on  no.below. (no identifiers) to call back to discuss with triage nurse.  (daughter, Venice, nat)  Tigits Corral RN on 8/7/2020 at 10:46 AM

## 2020-08-07 NOTE — TELEPHONE ENCOUNTER
"To PCP: Please see below- Pt has VV scheduled on Friday 8/14, would you like to see in clinic sooner?    Please advise,     Thank you,   Kat WHITTAKER RN      Pt's daughter calls:    S: Pt c/o of right sided upper back pain     B: Fall a few weeks ago on the right side    Treated by Dr. Roberts for Multiple Myeloma- has been given oxycodone by Dr. Roberts     A:   Onset: Couple of days ago, seems to be different than discomfort from fall      Location: Right side- maybe back, maybe shoulder \"she is very vague\"   \"identified a spot on the right side of her spine\"     Denies SOB/CP   Denies numbness/tingling   Denies NEW problems with bowel/bladder control- has been incontinent at baseline with water pills     Aggravating: certain movement, certain positions- sometimes needs a boost up  Alleviating: Tylenol and oxycodone    Can walk around home and most ADL's    Family is concerned about just covering up pain and not knowing cause    Inquires if they should   1) watch/wait  2) come for xray/evaluation     R: seek care if symptoms worsen, another fall     Routing to PCP               Answer Assessment - Initial Assessment Questions  Under care of Dr. Roberts for multiple Myeloma     1. ONSET: \"When did the pain begin?\"         Couple of days ago     2. LOCATION: \"Where does it hurt?\" (upper, mid or lower back)      Right side- maybe back, maybe shoulder \"she is very vague\"   \"identified a spot on the right side of her spine\"     3. SEVERITY: \"How bad is the pain?\"  (e.g., Scale 1-10; mild, moderate, or severe)      - MILD (1-3): doesn't interfere with normal activities       4. PATTERN: \"Is the pain constant?\" (e.g., yes, no; constant, intermittent)     Comes/goes- aggravated by movement     5. RADIATION: \"Does the pain shoot into your legs or elsewhere?\"    Denies     6. CAUSE:  \"What do you think is causing the back pain?\"     Unsure     7. BACK OVERUSE:  \"Any recent lifting of heavy objects, strenuous work or " "exercise?\"    Fall a couple of weeks ago     8. MEDICATIONS: \"What have you taken so far for the pain?\" (e.g., nothing, acetaminophen, NSAIDS)    Tylenol  Oxycodone from Dr. Roberts     9. NEUROLOGIC SYMPTOMS: \"Do you have any weakness, numbness, or problems with bowel/bladder control?\"    Generalized weakness    Denies numbness/tingling   Denies NEW problems with bowel/bladder control- has been incontinent with       10. OTHER SYMPTOMS: \"Do you have any other symptoms?\" (e.g., fever, abdominal pain, burning with urination, blood in urine)         Denies shortness of breath    Protocols used: BACK PAIN-A-OH      "

## 2020-08-07 NOTE — TELEPHONE ENCOUNTER
[facility] Telephone Triage Note    Assessment: Family Member Venice (daughter) called in to triage reporting the following symptoms: ongoing chronic back pain. Wondering if this could be related to her multiple myeloma? Reports Dr. Roberts manages her back pain at this time. Wondering if he would like to evaluate with any imaging or sooner appt as back pain seems to be getting worse. Denies any acute symptoms or uncontrolled pain.     Recommendations: Will route to Dr. Roberts for review/follow-up. Patient is currently scheduled 10/7/20. Call also out to PCP. Advised Venice follow-up with PCP as well for any recommendations. Venice is comfortable waiting until next week for recommendations.    Follow-Up: Instructed patient to seek care immediately for worsening symptoms, including: fever, chest pain, shortness of breath, dizziness. Patient voiced understanding of advice and/or instructions given.     Felicity Gutierrez, PAULINAN, RN, PHN  Oncology Care Coordinator  [facility]

## 2020-08-10 DIAGNOSIS — I50.32 CHRONIC DIASTOLIC HEART FAILURE (H): ICD-10-CM

## 2020-08-10 RX ORDER — FUROSEMIDE 20 MG
TABLET ORAL
Qty: 270 TABLET | Refills: 0 | Status: SHIPPED | OUTPATIENT
Start: 2020-08-10 | End: 2020-11-11

## 2020-08-10 NOTE — TELEPHONE ENCOUNTER
Writer spoke with patient's daughter, Yesi, who informed me that patient's pain really started up a little over a week after she had a fall off a chair approximately 2 weeks ago. She fell on her right side and caught herself with her elbow and which is scraped and bruised.  Patient has been managing pain with alternating Tylenol and then 1 -2 doses a day of the Oxycodone. She has approximately 20 doses left.

## 2020-08-10 NOTE — TELEPHONE ENCOUNTER
Dr. Roberts aware of the additional information and is recommending an Orthopedic referral.     Writer called and spoke francisco j Key and she agrees with having referral placed and has been directed that the Orthopedic  will call her within 1 business day.     Gabriela Lee RN

## 2020-08-11 ENCOUNTER — ANTICOAGULATION THERAPY VISIT (OUTPATIENT)
Dept: FAMILY MEDICINE | Facility: CLINIC | Age: 85
End: 2020-08-11

## 2020-08-11 ENCOUNTER — TELEPHONE (OUTPATIENT)
Dept: FAMILY MEDICINE | Facility: CLINIC | Age: 85
End: 2020-08-11

## 2020-08-11 DIAGNOSIS — Z79.01 LONG TERM CURRENT USE OF ANTICOAGULANT THERAPY: ICD-10-CM

## 2020-08-11 DIAGNOSIS — I48.0 PAROXYSMAL ATRIAL FIBRILLATION (H): ICD-10-CM

## 2020-08-11 LAB — INR PPP: 2.6 (ref 0.9–1.1)

## 2020-08-11 NOTE — PROGRESS NOTES
ANTICOAGULATION MANAGEMENT     Patient Name:  Amira Arreola  Date:  2020    ASSESSMENT /SUBJECTIVE:    Today's INR result of 2.6 is therapeutic. Goal INR of 2.0-3.0      Warfarin dose taken: Warfarin taken as previously instructed    Diet: No new diet changes affecting INR    Medication changes/ interactions: No new medications/supplements affecting INR    Previous INR: supratherapeutic     S/S of bleeding or thromboembolism: No    New injury or illness: No    Upcoming surgery, procedure or cardioversion: No    Additional findings: None      PLAN:    Spoke with Nurse Gudelia regarding INR result and instructed:     Warfarin Dosing Instructions: Continue your current warfarin dose 4 mg daily    Instructed patient to follow up no later than: 1 week  Orders given to  Homecare nurse/facility to recheck    Education provided: Monitoring for bleeding signs and symptoms and Monitoring for clotting signs and symptoms      Gudelia,  verbalizes understanding and agrees to warfarin dosing plan.    Instructed to call the Anticoagulation Clinic for any changes, questions or concerns. (#[phone])        Tameka Perez RN      OBJECTIVE:  Recent labs: (last 7 days)     20   INR 2.6*         No question data found.  Anticoagulation Summary  As of 2020    INR goal:   2.0-3.0   TTR:   80.2 % (1 y)   INR used for dosin.6 (2020)   Warfarin maintenance plan:   4 mg (4 mg x 1) every day   Full warfarin instructions:   4 mg every day   Weekly warfarin total:   28 mg   No change documented:   Tameka Perez RN   Plan last modified:   Suyapa Walton RN (2020)   Next INR check:   2020   Priority:   Maintenance   Target end date:   Indefinite    Indications    Long term current use of anticoagulant therapy [Z79.01]  Atrial fibrillation (H) [I48.91] (Resolved) [I48.91]  Paroxysmal atrial fibrillation (H) [I48.0]             Anticoagulation Episode Summary     INR check location:       Preferred  lab:   EXTERNAL LAB    Send INR reminders to:   DANTE SUMNER    Comments:    Rajiv RN [facility] [phone] private pay nursing visits to check INR      Anticoagulation Care Providers     Provider Role Specialty Phone number    Addy Frias MD Referring Internal Medicine [phone]

## 2020-08-11 NOTE — TELEPHONE ENCOUNTER
Reason for Call:  Home Health Care    Gudelia with [facility] called regarding (reason for call): Need for verbal orders for in-home nursing visits    Orders are needed for this patient. In-home nursing visits monthly for 3 months    Other documentation: Patient fell late last week. Didn't hit her head but has developed some rib pain since the fall    Pt Provider: Dr. Frias    Phone Number Homecare Nurse can be reached at: [phone]    Can we leave a detailed message on this number? YES    Best Time: Any    Call taken on 8/11/2020 at 12:18 PM by Eric Dumas [facility]

## 2020-08-11 NOTE — TELEPHONE ENCOUNTER
Triage note 8/6 regarding pain from fall, but looks like below is new fall     Called and LVM detailed giving verbal on below orders, but notifying her upcoming visit is phone visit, so if pt needing an OV or video visit instead, HC will need to notify pt of this. Also asking her to call back if anything needed sooner than Fridays visit regarding pain    Aruna ROBERSON RN

## 2020-08-14 ENCOUNTER — VIRTUAL VISIT (OUTPATIENT)
Dept: FAMILY MEDICINE | Facility: CLINIC | Age: 85
End: 2020-08-14
Payer: MEDICARE

## 2020-08-14 DIAGNOSIS — I77.810 ASCENDING AORTA DILATATION (H): ICD-10-CM

## 2020-08-14 DIAGNOSIS — R07.81 RIB PAIN: Primary | ICD-10-CM

## 2020-08-14 DIAGNOSIS — C79.51 CANCER, METASTATIC TO BONE (H): ICD-10-CM

## 2020-08-14 DIAGNOSIS — G62.0 DRUG-INDUCED POLYNEUROPATHY (H): ICD-10-CM

## 2020-08-14 DIAGNOSIS — D69.6 THROMBOCYTOPENIA (H): ICD-10-CM

## 2020-08-14 PROCEDURE — 99441 ZZC PHYSICIAN TELEPHONE EVALUATION 5-10 MIN: CPT | Performed by: INTERNAL MEDICINE

## 2020-08-14 NOTE — PROGRESS NOTES
"Amira Arreola is a 88 year old female who is being evaluated via a billable telephone visit.      The patient has been notified of following:     \"This telephone visit will be conducted via a call between you and your physician/provider. We have found that certain health care needs can be provided without the need for a physical exam.  This service lets us provide the care you need with a short phone conversation.  If a prescription is necessary we can send it directly to your pharmacy.  If lab work is needed we can place an order for that and you can then stop by our lab to have the test done at a later time.    Telephone visits are billed at different rates depending on your insurance coverage. During this emergency period, for some insurers they may be billed the same as an in-person visit.  Please reach out to your insurance provider with any questions.    If during the course of the call the physician/provider feels a telephone visit is not appropriate, you will not be charged for this service.\"    Patient has given verbal consent for Telephone visit?  Yes    What phone number would you like to be contacted at? [phone]    How would you like to obtain your AVS? Mail a copy    Subjective      I called the patient.  She fell 2 weeks ago when sat down and chair gave out and broke.  Fell onto floor on right side and felt like a broken rib.  Now minimal pain but wanted to let me know. She did not hit her head and does not have pain anywhere else.  Some slight neck stiffness.  No ha, no n/v.   No fever.  No back pain.  No chest pain or shortness of breath.    Past Medical History:   Diagnosis Date     Abnormal CXR 2018    then ct done and not significant     Acute diastolic heart failure (H) 03/22/2019    nl ef, 2+mr and tr with severe pulm htn     Ascending aorta dilatation (H) 04/2016    on echo, mild, fu 7/18 4.0, slightly larger     Cancer, metastatic to bone (H)     due to myeloma     Colonic polyp 2008 "    adenomatous, fu  tics only     Compression fracture     multiple areas of spine     Dry eyes      Elevated MCV     b12 and folic acid nl     HTN (hypertension)     off meds for years     Lung nodule 2018    on ct, 4mm, ct done for fu abnl cxr     Menorrhagia     hysteroscopy and d and c done     MGUS (monoclonal gammopathy of unknown significance)     eval by Dr. Roberts     Moderate to severe pulmonary hypertension (H) 2019    on echo     Multiple myeloma (H) 2016    dx  at [place], bone lesions seen on mri      OAB (overactive bladder)     Dr. Grullon     Osteoporosis     fu done  and stable, went off meds then, fu done ; has had gyn fu and added evista  by gyn     Palpitations     nl echo, mildly dilated asc aorta     Paroxysmal atrial fibrillation (H) 2016    had palp and ziopatch showed it, echo nl lv fxn, mild mr and tr, added coum and toprol, toprol dose raised 16; hosp  for this 3x, then had av solomon ablation and ppm      Sciatica of left side     Dr. Helen Ricardo 2004     SVT (supraventricular tachycardia) (H)     on ziopatch     Thrombocytopenia (H)      Past Surgical History:   Procedure Laterality Date     BONE MARROW BIOPSY, BONE SPECIMEN, NEEDLE/TROCAR N/A 2016    Procedure: BIOPSY BONE MARROW;  Surgeon: Bryan Patel MD;  Location:  GI     CATARACT IOL, RT/LT        SECTION  1965, 1966     COLONOSCOPY  2013    Procedure: COLONOSCOPY;  COLONOSCOPY;  Surgeon: Steffany Rockwell MD;  Location:  GI     EP ABLATION AV NODE N/A 2019    Procedure: EP Ablation AV Node;  Surgeon: Lee Menchaca MD;  Location:  [facility]     EP PACEMAKER N/A 2019    Procedure: EP Pacemaker;  Surgeon: Lee Menchaca MD;  Location:  [facility]     EXCISE EXOSTOSIS TIBIA / FIBULA  2014    Procedure: EXCISE EXOSTOSIS TIBIA / FIBULA;  Surgeon: Kylee  Naila JEFFERSON MD;  Location: [facility]     hysteroscopy and d and c  2002    due to bleeding     left anle replacement  2007     right ankle surgery  2003     Social History     Socioeconomic History     Marital status:      Spouse name: Tom     Number of children: 6     Years of education: Not on file     Highest education level: Not on file   Occupational History     Occupation: rn anesthetist     Employer: RETIRED   Social Needs     Financial resource strain: Not on file     Food insecurity     Worry: Not on file     Inability: Not on file     Transportation needs     Medical: Not on file     Non-medical: Not on file   Tobacco Use     Smoking status: Never Smoker     Smokeless tobacco: Never Used   Substance and Sexual Activity     Alcohol use: No     Alcohol/week: 0.0 standard drinks     Drug use: No     Sexual activity: Never   Lifestyle     Physical activity     Days per week: Not on file     Minutes per session: Not on file     Stress: Not on file   Relationships     Social connections     Talks on phone: Not on file     Gets together: Not on file     Attends Yarsanism service: Not on file     Active member of club or organization: Not on file     Attends meetings of clubs or organizations: Not on file     Relationship status: Not on file     Intimate partner violence     Fear of current or ex partner: Not on file     Emotionally abused: Not on file     Physically abused: Not on file     Forced sexual activity: Not on file   Other Topics Concern     Parent/sibling w/ CABG, MI or angioplasty before 65F 55M? Not Asked   Social History Narrative     Not on file     Current Outpatient Medications   Medication Sig Dispense Refill     acetaminophen (TYLENOL) 500 MG tablet Take 500 mg by mouth every 6 hours as needed for mild pain        acyclovir (ZOVIRAX) 400 MG tablet Take 1 tablet (400 mg) by mouth 2 times daily 60 tablet 4     Carboxymethylcellulose Sod PF (REFRESH PLUS) 0.5 % SOLN ophthalmic solution 1 drop  4 times daily as needed for dry eyes       dexamethasone (DECADRON) 4 MG tablet Take 20mg (5 tablets) by mouth on Wed 20 tablet 4     furosemide (LASIX) 20 MG tablet Take 3 (60mg) tablets daily, if weight > 137 pounds take an additional 20mg (1 tablet) 270 tablet 0     latanoprost (XALATAN) 0.005 % ophthalmic solution Place 1 drop into the right eye At Bedtime       lidocaine-prilocaine (EMLA) cream Apply to port site 1 hour prior to access 30 g 1     Multiple Vitamins-Minerals (DAILY MULTIVITAMIN) CAPS Take 1 tablet by mouth daily        nystatin (MYCOSTATIN) 419874 UNIT/GM external cream Apply topically 2 times daily 30 g 0     order for DME Equipment being ordered: Nebulizer with tubes/mask/acessories, use as directed 1 Device 0     oxyCODONE (ROXICODONE) 5 MG tablet Take half a pill every 12 hours as needed for pain. 30 tablet 0     polyethylene glycol (MIRALAX/GLYCOLAX) powder Take 17 g by mouth daily as needed for constipation        potassium chloride ER (K-DUR/KLOR-CON M) 20 MEQ CR tablet Take 1 tablet 2 x a day, with an additional 1 tablet on days when you take extra fursoemide 180 tablet 3     SIMBRINZA 1-0.2 % ophthalmic suspension Place 1 drop into the right eye 2 times daily 1 drop AM and PM  2     Sodium Fluoride (SF 5000 PLUS) 1.1 % CREA Apply to affected area 3 times daily       triamcinolone (KENALOG) 0.1 % external cream Apply topically 2 times daily 15 g 1     warfarin (COUMADIN) 4 MG tablet As directed.       Allergies   Allergen Reactions     Blood Transfusion Related (Informational Only)      Blood antigens related to receiving Darzelex-AG     Penicillin [Penicillins] Rash     Blotches on chest      FAMILY HISTORY NOTED AND REVIEWED    REVIEW OF SYSTEMS: above      ASSESSMENT:  Fall with rib pain, doing well now, doubt fx, doubt internal bleeding despite coum    M.m stable, follow up pnc  Low platelet, follow up onc  asc aortic dil, no follow up needed at this time    PLAN:  Call if pain  returns, fever, chest pain or shortness of breath    Addy Frias M.D.  [facility]

## 2020-08-19 ENCOUNTER — ANTICOAGULATION THERAPY VISIT (OUTPATIENT)
Dept: FAMILY MEDICINE | Facility: CLINIC | Age: 85
End: 2020-08-19

## 2020-08-19 DIAGNOSIS — Z79.01 LONG TERM CURRENT USE OF ANTICOAGULANT THERAPY: ICD-10-CM

## 2020-08-19 DIAGNOSIS — I48.0 PAROXYSMAL ATRIAL FIBRILLATION (H): ICD-10-CM

## 2020-08-19 LAB — INR PPP: 3.4 (ref 0.9–1.1)

## 2020-08-19 NOTE — PROGRESS NOTES
ANTICOAGULATION MANAGEMENT     Patient Name:  Amira Arreola  Date:  8/19/2020    ASSESSMENT /SUBJECTIVE:    Today's INR result of 3.4 is supratherapeutic. Goal INR of 2.0-3.0      Warfarin dose taken: Warfarin taken as previously instructed    Diet: No new diet changes affecting INR    Medication changes/ interactions: No new medications/supplements affecting INR    Previous INR: Therapeutic     S/S of bleeding or thromboembolism: No    New injury or illness: No    Upcoming surgery, procedure or cardioversion: No    Additional findings: None      PLAN:    Spoke with SHELLIE Romero regarding INR result and instructed:     Warfarin Dosing Instructions: Take 2mg tonight then change your dose to 2mg Friday & 4mg AOD    Instructed patient to follow up no later than: 1 week  Orders given to  Homecare nurse/facility to recheck    Education provided: Please call back if any changes to your diet, medications or how you've been taking warfarin      Heather HENSLEY verbalizes understanding and agrees to warfarin dosing plan.    Instructed to call the Anticoagulation Clinic for any changes, questions or concerns. (#[phone])        Jenifer Schultz RN      OBJECTIVE:  Recent labs: (last 7 days)     08/19/20   INR 3.4*         No question data found.  Anticoagulation Summary  As of 8/19/2020    INR goal:   2.0-3.0   TTR:   79.6 % (1 y)   INR used for dosing:   3.4! (8/19/2020)   Warfarin maintenance plan:   2 mg (4 mg x 0.5) every Fri; 4 mg (4 mg x 1) all other days   Full warfarin instructions:   8/19: 2 mg; Otherwise 2 mg every Fri; 4 mg all other days   Weekly warfarin total:   26 mg   Plan last modified:   Jenifer Schultz, RN (8/19/2020)   Next INR check:   8/25/2020   Priority:   Maintenance   Target end date:   Indefinite    Indications    Long term current use of anticoagulant therapy [Z79.01]  Atrial fibrillation (H) [I48.91] (Resolved) [I48.91]  Paroxysmal atrial fibrillation (H) [I48.0]             Anticoagulation Episode  Summary     INR check location:       Preferred lab:   EXTERNAL LAB    Send INR reminders to:   DANTE SUMNER    Comments:    Rajiv RN [facility] [phone] private pay nursing visits to check INR      Anticoagulation Care Providers     Provider Role Specialty Phone number    Addy Frias MD Referring Internal Medicine [phone]         Jenifer Henderson RN, BSN, PHN

## 2020-08-25 ENCOUNTER — TELEPHONE (OUTPATIENT)
Dept: CARDIOLOGY | Facility: CLINIC | Age: 85
End: 2020-08-25

## 2020-08-25 ENCOUNTER — TELEPHONE (OUTPATIENT)
Dept: FAMILY MEDICINE | Facility: CLINIC | Age: 85
End: 2020-08-25

## 2020-08-25 DIAGNOSIS — Z79.01 LONG TERM CURRENT USE OF ANTICOAGULANT THERAPY: ICD-10-CM

## 2020-08-25 DIAGNOSIS — I48.0 PAROXYSMAL ATRIAL FIBRILLATION (H): ICD-10-CM

## 2020-08-25 LAB — INR PPP: 2.5 (ref 0.9–1.1)

## 2020-08-25 NOTE — TELEPHONE ENCOUNTER
Anticoagulation Management    Unable to reach Jessica with homecare today.    Today's INR result of 2.5 is therapeutic (goal INR of 2.0-3.0).  Result received from: Home Care    Follow up required to confirm warfarin dose taken and assess for changes    Left message to continue current dose of warfarin 4 mg tonight.  Left detailed message to plan to resume regular dose of 2 mg on Fridays and 4 mg daily and recheck in 1-2 weeks if no further changes.      Anticoagulation clinic to follow up    Cinthya Guadalupe [facility]

## 2020-08-25 NOTE — TELEPHONE ENCOUNTER
Reason for Call:  INR    Who is calling?  [facility], [place]    Phone number:  [phone]    Fax number:  Verbal orders ok    Name of caller: Jessica     INR Value:  2.5    Are there any other concerns:  No    Can we leave a detailed message on this number? YES    Phone number patient can be reached at: Other phone number:  None      Call taken on 8/25/2020 at 2:21 PM by Akanksha Pollock

## 2020-08-25 NOTE — TELEPHONE ENCOUNTER
Wellness Screening Tool    Symptom Screening:    Do you have one of the following NEW symptoms:      Fever (subjective or >100.0)?  No    New cough? No    Shortness of breath? No    Chills? No    New loss of taste or smell? No    Generalized body aches? No    New persistent headache? No    New sore throat? No    Nausea, vomiting or diarrhea? No    Within the past 3 weeks, have you been exposed to someone with a known positive illness below?      COVID - 19 (known or suspected) No    Chicken pox?  No    Measles? No    Pertussis? No    Have you had a positive COVID-19 diagnostic test (nasal swab test) in the last 14 days or are you currently   on self-quarantine restrictions (i.e.travel restriction, exposure, etc?) No        Patient notified of visitor restriction: Yes  Patient informed to wear a mask: Yes    Patient's appointment status: Patient will be seen in clinic as scheduled on 8/26/2020 @ 12:10 FOR LAB.  JEMAL Rey

## 2020-08-26 DIAGNOSIS — I50.32 CHRONIC DIASTOLIC HEART FAILURE (H): ICD-10-CM

## 2020-08-26 LAB
ANION GAP SERPL CALCULATED.3IONS-SCNC: 10 MMOL/L (ref 6–17)
BUN SERPL-MCNC: 17 MG/DL (ref 7–30)
CALCIUM SERPL-MCNC: 10.1 MG/DL (ref 8.5–10.5)
CHLORIDE SERPL-SCNC: 109 MMOL/L (ref 98–107)
CO2 SERPL-SCNC: 25 MMOL/L (ref 23–29)
CREAT SERPL-MCNC: 0.93 MG/DL (ref 0.7–1.3)
GFR SERPL CREATININE-BSD FRML MDRD: 57 ML/MIN/{1.73_M2}
GLUCOSE SERPL-MCNC: 120 MG/DL (ref 70–105)
POTASSIUM SERPL-SCNC: 4 MMOL/L (ref 3.5–5.1)
SODIUM SERPL-SCNC: 140 MMOL/L (ref 136–145)

## 2020-08-26 PROCEDURE — 80048 BASIC METABOLIC PNL TOTAL CA: CPT | Performed by: NURSE PRACTITIONER

## 2020-08-26 PROCEDURE — 36415 COLL VENOUS BLD VENIPUNCTURE: CPT | Performed by: NURSE PRACTITIONER

## 2020-08-31 ENCOUNTER — OFFICE VISIT (OUTPATIENT)
Dept: CARDIOLOGY | Facility: CLINIC | Age: 85
End: 2020-08-31
Payer: MEDICARE

## 2020-08-31 VITALS
HEART RATE: 75 BPM | WEIGHT: 139.9 LBS | BODY MASS INDEX: 23.28 KG/M2 | OXYGEN SATURATION: 99 % | DIASTOLIC BLOOD PRESSURE: 81 MMHG | SYSTOLIC BLOOD PRESSURE: 129 MMHG

## 2020-08-31 DIAGNOSIS — I10 BENIGN ESSENTIAL HYPERTENSION: ICD-10-CM

## 2020-08-31 DIAGNOSIS — I50.32 CHRONIC DIASTOLIC HEART FAILURE (H): Primary | ICD-10-CM

## 2020-08-31 DIAGNOSIS — I27.20 PULMONARY HYPERTENSION (H): ICD-10-CM

## 2020-08-31 DIAGNOSIS — I48.91 ATRIAL FIBRILLATION, UNSPECIFIED TYPE (H): ICD-10-CM

## 2020-08-31 PROCEDURE — 99214 OFFICE O/P EST MOD 30 MIN: CPT | Performed by: NURSE PRACTITIONER

## 2020-08-31 NOTE — PROGRESS NOTES
Cardiology Clinic Progress Note  Amira Arreola MRN# [id number]   YOB: 1932 Age: 88 year old   Primary Cardiologist: Dr. Rivera Reason for visit: CORE follow up             Assessment and Plan:   Amira Arreola is a very pleasant 88 year old female with a history of HFpEF, chronic atrial fibrillation, hypertension, mitral regurgitation, tricuspid regurgitation and hx of multiple myeloma (Follows with Dr. Roberts).     1.  Chronic diastolic heart failure/HFpEF - Echocardiogram completed 3/23/19 LVEF 55-60%, no wall motion abnormalities, moderate mitral regurgitation, moderate tricuspid regurgitation, and severe pulmonary hypertension. Monitoring weight daily at home, brings log to clinic today, weight has been ranging 139-143# for past several months. Lower extremity edema is well controlled today which is a significant improvement from the past. Dietary indiscretions with sodium remains, as son manages most meals which are picked up from [employer] or [employer]. Labs 8/26 show stable kidney function, creatinine 0.93. Stable electrolytes, potassium 4.0. Overall patient is doing well from a HF standpoint, will continue furosemide 60mg daily with additional PRN dose of furosemide 20mg for weight gain or increased swelling.               - NYHA class III, stage C              - Dry weight : ~ 130#              - Diuretic regimen : continue furosemide to 60mg daily, additional 20mg for weight gain >2# overnight or increased edema.               - Continue potassium to 20meq BID and additional 20meq on days when she takes an extra furosemide.               - Aldosterone antagonist : none              - Blood pressure : controlled              - Reinforced HF education, reviewed low sodium diet, reviewed foods to avoid.      2. Chronic atrial fibrillation - s/p AV solomon ablation with PPM implantation 7/5/19, stable, significant improvement in symptoms since ablation and heart rate control achieved.                - [id number] score 5 (HTN, HF, age, female)              - Continue warfarin for thromboembolic prophylaxis.      3. Moderate mitral regurgitation, moderate tricuspid regurgitation               - Will consider repeat echocardiogram in the future if worsening symptoms, currently won't change any management clinically.  Last echo 3/2019.     4. Severe pulmonary hypertension - likely secondary to HFpEF and likely improved with diuresis.              - Will consider repeat echocardiogram in the future, currently won't .      5. Multiple myeloma with mets to bone - follows with Dr. Roberts                   Changes today: none    Follow up plan:    CORE follow up with me in 3 months (virtual visit).         History of Presenting Illness:    Amira Arreola is a very pleasant 88 year old female with a history of HFpEF, chronic atrial fibrillation, hypertension, mitral regurgitation, tricuspid regurgitation and hx of multiple myeloma (Follows with Dr. Roberts).     Amira had multiple hospitalizations last summer (2019) for atrial fibrillation and decompensated diastolic HF, ultimately undergoing an AV solomon ablation with PPM 7/5/19. Echocardiogram completed 3/23/19 LVEF 55-60%, no wall motion abnormalities, moderate mitral regurgitation, moderate tricuspid regurgitation, and severe pulmonary hypertension.      Patient has been following closely in CORE clinic, last had a virtual visit in April 2020 at which point she was doing well. She reported overall controlled symptoms, her family helps with medication management and she was unable to recall if she had needed any PRN furosemide.     She saw Dr. Roberts the end of June for her slowly progressing multiple myeloma at which time it was recommended to continue dexamethasone 20mg and noted would not start any other treatment unless there was significant progression of disease or evidence of end organ damage.     Patient is here today for CORE  follow up.     Patient reports feeling good. Daughter with for visit. Monitoring weights daily a home, weight has been ranging 139-143#. States she rarely needs additional furosemide. States lower extremity edema has been controlled. Denies shortness of breath at rest. Denies exertional dyspnea. Sleeping good. Denies orthopnea or PND. Patient denies chest pain or chest tightness. Denies dizziness, lightheadedness or other presyncopal symptoms. Denies tachycardia or palpitations. Fell 2 months ago, kitchen chair was broken. No recurrent falls. Denies episodes of bleeding. Chronic back pain, taking pain medications which help. Daughter notes some forgetfulness.     Labs from 8/26/20 show stable kidney function and electrolytes. Blood pressure 129/81 and HR 75 in clinic today.    Appetite is good. Son helps with dinner, patient notes that many items are things that are just warmed up in the oven, picking up meals from [employer] and [employer]. Daughter helping with one meal per week. Diet continues to be high in sodium. No tobacco or alcohol use. Using a walker for assistance at home. Wheelchair when outside of the house. Patient notes minimal trips outside of the house due to COVID-19, only medical appointments. She had homemaker services 3 x a week for 4 hours to help with cleaning and bathing. Daughter setting up pill boxes.         Recent Hospitalizations   7/3/19-7/7/19 presenting from the cardiology office with hypotension and altered mental status felt to be related to hypovolemia and hypercalcemia. Patient was in atrial fibrillation with RVR. Serial troponin negative. Unable to tolerate rate control medications due to hypotension s/p AV solomon ablation with PPM 7/5/19. Furosemide on hold during most of hospital stay due to concerns of hypovolemia, resumed at lower dosage 20mg at discharge. S/p thoracentesis 7/5 250cc removed, transudative. Palliative care met with patient/family during hospital stay, still wishing  to receive restorative care.   6/28/19-7/1/19 related to atrial fibrillation with RVR, acute on chronic diastolic heart failure and bilateral pleural effusions. BNP 13K on admission, 2K at time of discharge. Diltiazem increased to 180mg/day.  3/22/19-3/28/19 presented with dyspnea, leg swelling and intermittent palpitations. Patient was found to have atrial fibrillation with RVR and acute diastolic heart failure exacerbation. Weight 3/24/19 147# (doesn't appear admission weight was completed). Discharge weight 135#.        Social History    , 6 children. Retired nurse.   Social History     Socioeconomic History     Marital status:      Spouse name: Tom     Number of children: 6     Years of education: Not on file     Highest education level: Not on file   Occupational History     Occupation: rn anesthetist     Employer: RETIRED   Social Needs     Financial resource strain: Not on file     Food insecurity     Worry: Not on file     Inability: Not on file     Transportation needs     Medical: Not on file     Non-medical: Not on file   Tobacco Use     Smoking status: Never Smoker     Smokeless tobacco: Never Used   Substance and Sexual Activity     Alcohol use: No     Alcohol/week: 0.0 standard drinks     Drug use: No     Sexual activity: Never   Lifestyle     Physical activity     Days per week: Not on file     Minutes per session: Not on file     Stress: Not on file   Relationships     Social connections     Talks on phone: Not on file     Gets together: Not on file     Attends [employer] service: Not on file     Active member of club or organization: Not on file     Attends meetings of clubs or organizations: Not on file     Relationship status: Not on file     Intimate partner violence     Fear of current or ex partner: Not on file     Emotionally abused: Not on file     Physically abused: Not on file     Forced sexual activity: Not on file   Other Topics Concern     Parent/sibling w/ CABG, MI or  angioplasty before 65F 55M? Not Asked   Social History Narrative     Not on file            Review of Systems:   Skin:  Positive for bruising   Eyes:  Positive for glaucoma  ENT:  Negative    Respiratory:  Negative cough  Cardiovascular:  Negative Positive for;edema  Gastroenterology: Negative constipation  Genitourinary:  Negative urinary frequency  Musculoskeletal:  Positive for arthritis  Neurologic:  Positive for local weakness;incoordination;numbness or tingling of feet  Psychiatric:  Negative anxiety  Heme/Lymph/Imm:  Positive for allergies  Endocrine:  Negative           Physical Exam:   Vitals: /81   Pulse 75   Wt 63.5 kg (139 lb 14.4 oz)   SpO2 99%   BMI 23.28 kg/m     Wt Readings from Last 4 Encounters:   08/31/20 63.5 kg (139 lb 14.4 oz)   04/06/20 62.6 kg (138 lb)   01/08/20 60.1 kg (132 lb 8 oz)   12/31/19 59.4 kg (131 lb)     GEN: well nourished, in no acute distress.  HEENT:  Pupils equal, round. Sclerae nonicteric.   NECK: Supple, no masses appreciated. No JVD appreciated on exam.   C/V:  Regular rate and rhythm, no murmur, rub or gallop.    RESP: Respirations are unlabored. Clear to auscultation bilaterally without wheezing, rales, or rhonchi.  GI: Abdomen soft, nontender.  EXTREM: Trace bilatearl LE edema.  NEURO: Alert and oriented, cooperative.  SKIN: Warm and dry.        Data:   ECHO 3/23/19  The left ventricle is normal in size.  The visual ejection fraction is estimated at 55-60%.  No regional wall motion abnormalities noted.  There is moderate (2+) mitral regurgitation.  There is moderate (2+) tricuspid regurgitation.  Severe (>55mmHg) pulmonary hypertension is present.  The rhythm was rapid atrial fibrillation.    LIPID RESULTS:  Lab Results   Component Value Date    CHOL 160 10/12/2016    HDL 76 10/12/2016    LDL 71 10/12/2016    TRIG 66 10/12/2016    CHOLHDLRATIO 2.3 09/21/2015     LIVER ENZYME RESULTS:  Lab Results   Component Value Date    AST 20 08/07/2019    ALT 15 08/07/2019      CBC RESULTS:  Lab Results   Component Value Date    WBC 6.9 06/24/2020    RBC 4.19 06/24/2020    HGB 10.8 (L) 06/24/2020    HCT 34.9 (L) 06/24/2020    MCV 83 06/24/2020    MCH 25.8 (L) 06/24/2020    MCHC 30.9 (L) 06/24/2020    RDW 16.7 (H) 06/24/2020     06/24/2020     BMP RESULTS:  Lab Results   Component Value Date     08/26/2020    POTASSIUM 4.0 08/26/2020    CHLORIDE 109 (H) 08/26/2020    CO2 25 08/26/2020    ANIONGAP 10 08/26/2020     (H) 08/26/2020    BUN 17 08/26/2020    CR 0.93 08/26/2020    GFRESTIMATED 57 (L) 08/26/2020    GFRESTBLACK 69 08/26/2020    BRADLEY 10.1 08/26/2020     INR RESULTS:  Lab Results   Component Value Date    INR 2.5 (A) 08/25/2020    INR 3.4 (A) 08/19/2020            Medications     Current Outpatient Medications   Medication Sig Dispense Refill     acetaminophen (TYLENOL) 500 MG tablet Take 500 mg by mouth every 6 hours as needed for mild pain        acyclovir (ZOVIRAX) 400 MG tablet Take 1 tablet (400 mg) by mouth 2 times daily 60 tablet 4     Carboxymethylcellulose Sod PF (REFRESH PLUS) 0.5 % SOLN ophthalmic solution 1 drop 4 times daily as needed for dry eyes       dexamethasone (DECADRON) 4 MG tablet Take 20mg (5 tablets) by mouth on Wed 20 tablet 4     furosemide (LASIX) 20 MG tablet Take 3 (60mg) tablets daily, if weight > 137 pounds take an additional 20mg (1 tablet) 270 tablet 0     latanoprost (XALATAN) 0.005 % ophthalmic solution Place 1 drop into the right eye At Bedtime       lidocaine-prilocaine (EMLA) cream Apply to port site 1 hour prior to access 30 g 1     Multiple Vitamins-Minerals (DAILY MULTIVITAMIN) CAPS Take 1 tablet by mouth daily        nystatin (MYCOSTATIN) 868380 UNIT/GM external cream Apply topically 2 times daily 30 g 0     order for DME Equipment being ordered: Nebulizer with tubes/mask/acessories, use as directed 1 Device 0     oxyCODONE (ROXICODONE) 5 MG tablet Take half a pill every 12 hours as needed for pain. 30 tablet 0      polyethylene glycol (MIRALAX/GLYCOLAX) powder Take 17 g by mouth daily as needed for constipation        potassium chloride ER (K-DUR/KLOR-CON M) 20 MEQ CR tablet Take 1 tablet 2 x a day, with an additional 1 tablet on days when you take extra fursoemide 180 tablet 3     SIMBRINZA 1-0.2 % ophthalmic suspension Place 1 drop into the right eye 2 times daily 1 drop AM and PM  2     Sodium Fluoride (SF 5000 PLUS) 1.1 % CREA Apply to affected area 3 times daily       triamcinolone (KENALOG) 0.1 % external cream Apply topically 2 times daily 15 g 1     warfarin (COUMADIN) 4 MG tablet As directed.            Past Medical History     Past Medical History:   Diagnosis Date     Abnormal CXR 2018    then ct done and not significant     Acute diastolic heart failure (H) 03/22/2019    nl ef, 2+mr and tr with severe pulm htn     Ascending aorta dilatation (H) 04/2016    on echo, mild, fu 7/18 4.0, slightly larger     Cancer, metastatic to bone (H)     due to myeloma     Colonic polyp 2008    adenomatous, fu 2013 tics only     Compression fracture 2016    multiple areas of spine     Dry eyes      Elevated MCV 2015    b12 and folic acid nl     HTN (hypertension) 2000    off meds for years     Lung nodule 08/2018    on ct, 4mm, ct done for fu abnl cxr     Menorrhagia 2002    hysteroscopy and d and c done     MGUS (monoclonal gammopathy of unknown significance) 2015    eval by Dr. Roberts     Moderate to severe pulmonary hypertension (H) 03/2019    on echo     Multiple myeloma (H) 2016    dx 5/16 at [facility], bone lesions seen on mri 6/16     OAB (overactive bladder) 2013    Dr. Grullon     Osteoporosis     fu done 2010 and stable, went off meds then, fu done 2013; has had gyn fu and added evista 2013 by gyn     Palpitations 4/16    nl echo, mildly dilated asc aorta     Paroxysmal atrial fibrillation (H) 04/2016    had palp and ziopatch showed it, echo nl lv fxn, mild mr and tr, added coum and toprol, toprol dose raised 12/22/16; hosp   for this 3x, then had av solomon ablation and ppm      Sciatica of left side     Dr. Helen Ricardo      SVT (supraventricular tachycardia) (H)     on ziopatch     Thrombocytopenia (H)      Past Surgical History:   Procedure Laterality Date     BONE MARROW BIOPSY, BONE SPECIMEN, NEEDLE/TROCAR N/A 2016    Procedure: BIOPSY BONE MARROW;  Surgeon: Bryan Patel MD;  Location:  GI     CATARACT IOL, RT/LT        SECTION  ,      COLONOSCOPY  2013    Procedure: COLONOSCOPY;  COLONOSCOPY;  Surgeon: Steffany Rockwell MD;  Location:  GI     EP ABLATION AV NODE N/A 2019    Procedure: EP Ablation AV Node;  Surgeon: Lee Menchaca MD;  Location:  [facility]ATH [facility]     EP PACEMAKER N/A 2019    Procedure: EP Pacemaker;  Surgeon: Lee Menchaca MD;  Location:  HEART [facility] CATH LAB     EXCISE EXOSTOSIS TIBIA / FIBULA  2014    Procedure: EXCISE EXOSTOSIS TIBIA / FIBULA;  Surgeon: Naila Pichardo MD;  Location:  SD     hysteroscopy and d and c      due to bleeding     left anle replacement       right ankle surgery       Family History   Problem Relation Age of Onset     Heart Disease Father      C.A.D. Mother      Cerebrovascular Disease Brother      Family History Negative Sister      Family History Negative Sister      Family History Negative Brother             Allergies   Blood transfusion related (informational only) and Penicillin [penicillins]        DAYSI Wolf [facility]  Pager: [phone]

## 2020-08-31 NOTE — LETTER
8/31/2020    Addy Frias MD  [address]  [facility] [address]    RE: Amira Arroela       Dear Colleague,    I had the pleasure of seeing Amira Arreola in the [facility].    Cardiology Clinic Progress Note  Amira Arreola MRN# [id number]   YOB: 1932 Age: 88 year old   Primary Cardiologist: Dr. Rivera Reason for visit: CORE follow up             Assessment and Plan:   Amira Arreola is a very pleasant 88 year old female with a history of HFpEF, chronic atrial fibrillation, hypertension, mitral regurgitation, tricuspid regurgitation and hx of multiple myeloma (Follows with Dr. Roberts).     1.  Chronic diastolic heart failure/HFpEF - Echocardiogram completed 3/23/19 LVEF 55-60%, no wall motion abnormalities, moderate mitral regurgitation, moderate tricuspid regurgitation, and severe pulmonary hypertension. Monitoring weight daily at home, brings log to clinic today, weight has been ranging 139-143# for past several months. Lower extremity edema is well controlled today which is a significant improvement from the past. Dietary indiscretions with sodium remains, as son manages most meals which are picked up from [employer] or [employer]. Labs 8/26 show stable kidney function, creatinine 0.93. Stable electrolytes, potassium 4.0. Overall patient is doing well from a HF standpoint, will continue furosemide 60mg daily with additional PRN dose of furosemide 20mg for weight gain or increased swelling.               - NYHA class III, stage C              - Dry weight : ~ 130#              - Diuretic regimen : continue furosemide to 60mg daily, additional 20mg for weight gain >2# overnight or increased edema.               - Continue potassium to 20meq BID and additional 20meq on days when she takes an extra furosemide.               - Aldosterone antagonist : none              - Blood pressure : controlled              - Reinforced HF education, reviewed low sodium diet,  reviewed foods to avoid.      2. Chronic atrial fibrillation - s/p AV solomon ablation with PPM implantation 7/5/19, stable, significant improvement in symptoms since ablation and heart rate control achieved.               - [id number] score 5 (HTN, HF, age, female)              - Continue warfarin for thromboembolic prophylaxis.      3. Moderate mitral regurgitation, moderate tricuspid regurgitation               - Will consider repeat echocardiogram in the future if worsening symptoms, currently won't change any management clinically.  Last echo 3/2019.     4. Severe pulmonary hypertension - likely secondary to HFpEF and likely improved with diuresis.              - Will consider repeat echocardiogram in the future, currently won't .      5. Multiple myeloma with mets to bone - follows with Dr. Roberts               Changes today: none    Follow up plan:    CORE follow up with me in 3 months (virtual visit).         History of Presenting Illness:    Amira Arreola is a very pleasant 88 year old female with a history of HFpEF, chronic atrial fibrillation, hypertension, mitral regurgitation, tricuspid regurgitation and hx of multiple myeloma (Follows with Dr. Roberts).     Amira had multiple hospitalizations last summer (2019) for atrial fibrillation and decompensated diastolic HF, ultimately undergoing an AV solomon ablation with PPM 7/5/19. Echocardiogram completed 3/23/19 LVEF 55-60%, no wall motion abnormalities, moderate mitral regurgitation, moderate tricuspid regurgitation, and severe pulmonary hypertension.      Patient has been following closely in CORE clinic, last had a virtual visit in April 2020 at which point she was doing well. She reported overall controlled symptoms, her family helps with medication management and she was unable to recall if she had needed any PRN furosemide.     She saw Dr. Roberts the end of June for her slowly progressing multiple myeloma at which time it was  recommended to continue dexamethasone 20mg and noted would not start any other treatment unless there was significant progression of disease or evidence of end organ damage.     Patient is here today for CORE follow up.     Patient reports feeling good. Daughter with for visit. Monitoring weights daily a home, weight has been ranging 139-143#. States she rarely needs additional furosemide. States lower extremity edema has been controlled. Denies shortness of breath at rest. Denies exertional dyspnea. Sleeping good. Denies orthopnea or PND. Patient denies chest pain or chest tightness. Denies dizziness, lightheadedness or other presyncopal symptoms. Denies tachycardia or palpitations. Fell 2 months ago, kitchen chair was broken. No recurrent falls. Denies episodes of bleeding. Chronic back pain, taking pain medications which help. Daughter notes some forgetfulness.     Labs from 8/26/20 show stable kidney function and electrolytes. Blood pressure 129/81 and HR 75 in clinic today.    Appetite is good. Son helps with dinner, patient notes that many items are things that are just warmed up in the oven, picking up meals from [employer] and [employer] foods. Daughter helping with one meal per week. Diet continues to be high in sodium. No tobacco or alcohol use. Using a walker for assistance at home. Wheelchair when outside of the house. Patient notes minimal trips outside of the house due to COVID-19, only medical appointments. She had homemaker services 3 x a week for 4 hours to help with cleaning and bathing. Daughter setting up pill boxes.         Recent Hospitalizations   7/3/19-7/7/19 presenting from the cardiology office with hypotension and altered mental status felt to be related to hypovolemia and hypercalcemia. Patient was in atrial fibrillation with RVR. Serial troponin negative. Unable to tolerate rate control medications due to hypotension s/p AV solomon ablation with PPM 7/5/19. Furosemide on hold during most of hospital  stay due to concerns of hypovolemia, resumed at lower dosage 20mg at discharge. S/p thoracentesis 7/5 250cc removed, transudative. Palliative care met with patient/family during hospital stay, still wishing to receive restorative care.   6/28/19-7/1/19 related to atrial fibrillation with RVR, acute on chronic diastolic heart failure and bilateral pleural effusions. BNP 13K on admission, 2K at time of discharge. Diltiazem increased to 180mg/day.  3/22/19-3/28/19 presented with dyspnea, leg swelling and intermittent palpitations. Patient was found to have atrial fibrillation with RVR and acute diastolic heart failure exacerbation. Weight 3/24/19 147# (doesn't appear admission weight was completed). Discharge weight 135#.        Social History    , 6 children. Retired nurse.   Social History     Socioeconomic History     Marital status:      Spouse name: Tom     Number of children: 6     Years of education: Not on file     Highest education level: Not on file   Occupational History     Occupation: rn anesthetist     Employer: RETIRED   Social Needs     Financial resource strain: Not on file     Food insecurity     Worry: Not on file     Inability: Not on file     Transportation needs     Medical: Not on file     Non-medical: Not on file   Tobacco Use     Smoking status: Never Smoker     Smokeless tobacco: Never Used   Substance and Sexual Activity     Alcohol use: No     Alcohol/week: 0.0 standard drinks     Drug use: No     Sexual activity: Never   Lifestyle     Physical activity     Days per week: Not on file     Minutes per session: Not on file     Stress: Not on file   Relationships     Social connections     Talks on phone: Not on file     Gets together: Not on file     Attends Hoahaoism service: Not on file     Active member of club or organization: Not on file     Attends meetings of clubs or organizations: Not on file     Relationship status: Not on file     Intimate partner violence     Fear of  current or ex partner: Not on file     Emotionally abused: Not on file     Physically abused: Not on file     Forced sexual activity: Not on file   Other Topics Concern     Parent/sibling w/ CABG, MI or angioplasty before 65F 55M? Not Asked   Social History Narrative     Not on file            Review of Systems:   Skin:  Positive for bruising   Eyes:  Positive for glaucoma  ENT:  Negative    Respiratory:  Negative cough  Cardiovascular:  Negative Positive for;edema  Gastroenterology: Negative constipation  Genitourinary:  Negative urinary frequency  Musculoskeletal:  Positive for arthritis  Neurologic:  Positive for local weakness;incoordination;numbness or tingling of feet  Psychiatric:  Negative anxiety  Heme/Lymph/Imm:  Positive for allergies  Endocrine:  Negative           Physical Exam:   Vitals: /81   Pulse 75   Wt 63.5 kg (139 lb 14.4 oz)   SpO2 99%   BMI 23.28 kg/m     Wt Readings from Last 4 Encounters:   08/31/20 63.5 kg (139 lb 14.4 oz)   04/06/20 62.6 kg (138 lb)   01/08/20 60.1 kg (132 lb 8 oz)   12/31/19 59.4 kg (131 lb)     GEN: well nourished, in no acute distress.  HEENT:  Pupils equal, round. Sclerae nonicteric.   NECK: Supple, no masses appreciated. No JVD appreciated on exam.   C/V:  Regular rate and rhythm, no murmur, rub or gallop.    RESP: Respirations are unlabored. Clear to auscultation bilaterally without wheezing, rales, or rhonchi.  GI: Abdomen soft, nontender.  EXTREM: Trace bilatearl LE edema.  NEURO: Alert and oriented, cooperative.  SKIN: Warm and dry.        Data:   ECHO 3/23/19  The left ventricle is normal in size.  The visual ejection fraction is estimated at 55-60%.  No regional wall motion abnormalities noted.  There is moderate (2+) mitral regurgitation.  There is moderate (2+) tricuspid regurgitation.  Severe (>55mmHg) pulmonary hypertension is present.  The rhythm was rapid atrial fibrillation.    LIPID RESULTS:  Lab Results   Component Value Date    CHOL 160  10/12/2016    HDL 76 10/12/2016    LDL 71 10/12/2016    TRIG 66 10/12/2016    CHOLHDLRATIO 2.3 09/21/2015     LIVER ENZYME RESULTS:  Lab Results   Component Value Date    AST 20 08/07/2019    ALT 15 08/07/2019     CBC RESULTS:  Lab Results   Component Value Date    WBC 6.9 06/24/2020    RBC 4.19 06/24/2020    HGB 10.8 (L) 06/24/2020    HCT 34.9 (L) 06/24/2020    MCV 83 06/24/2020    MCH 25.8 (L) 06/24/2020    MCHC 30.9 (L) 06/24/2020    RDW 16.7 (H) 06/24/2020     06/24/2020     BMP RESULTS:  Lab Results   Component Value Date     08/26/2020    POTASSIUM 4.0 08/26/2020    CHLORIDE 109 (H) 08/26/2020    CO2 25 08/26/2020    ANIONGAP 10 08/26/2020     (H) 08/26/2020    BUN 17 08/26/2020    CR 0.93 08/26/2020    GFRESTIMATED 57 (L) 08/26/2020    GFRESTBLACK 69 08/26/2020    BRADLEY 10.1 08/26/2020     INR RESULTS:  Lab Results   Component Value Date    INR 2.5 (A) 08/25/2020    INR 3.4 (A) 08/19/2020            Medications     Current Outpatient Medications   Medication Sig Dispense Refill     acetaminophen (TYLENOL) 500 MG tablet Take 500 mg by mouth every 6 hours as needed for mild pain        acyclovir (ZOVIRAX) 400 MG tablet Take 1 tablet (400 mg) by mouth 2 times daily 60 tablet 4     Carboxymethylcellulose Sod PF (REFRESH PLUS) 0.5 % SOLN ophthalmic solution 1 drop 4 times daily as needed for dry eyes       dexamethasone (DECADRON) 4 MG tablet Take 20mg (5 tablets) by mouth on Wed 20 tablet 4     furosemide (LASIX) 20 MG tablet Take 3 (60mg) tablets daily, if weight > 137 pounds take an additional 20mg (1 tablet) 270 tablet 0     latanoprost (XALATAN) 0.005 % ophthalmic solution Place 1 drop into the right eye At Bedtime       lidocaine-prilocaine (EMLA) cream Apply to port site 1 hour prior to access 30 g 1     Multiple Vitamins-Minerals (DAILY MULTIVITAMIN) CAPS Take 1 tablet by mouth daily        nystatin (MYCOSTATIN) 195804 UNIT/GM external cream Apply topically 2 times daily 30 g 0     order  for DME Equipment being ordered: Nebulizer with tubes/mask/acessories, use as directed 1 Device 0     oxyCODONE (ROXICODONE) 5 MG tablet Take half a pill every 12 hours as needed for pain. 30 tablet 0     polyethylene glycol (MIRALAX/GLYCOLAX) powder Take 17 g by mouth daily as needed for constipation        potassium chloride ER (K-DUR/KLOR-CON M) 20 MEQ CR tablet Take 1 tablet 2 x a day, with an additional 1 tablet on days when you take extra fursoemide 180 tablet 3     SIMBRINZA 1-0.2 % ophthalmic suspension Place 1 drop into the right eye 2 times daily 1 drop AM and PM  2     Sodium Fluoride (SF 5000 PLUS) 1.1 % CREA Apply to affected area 3 times daily       triamcinolone (KENALOG) 0.1 % external cream Apply topically 2 times daily 15 g 1     warfarin (COUMADIN) 4 MG tablet As directed.            Past Medical History     Past Medical History:   Diagnosis Date     Abnormal CXR 2018    then ct done and not significant     Acute diastolic heart failure (H) 03/22/2019    nl ef, 2+mr and tr with severe pulm htn     Ascending aorta dilatation (H) 04/2016    on echo, mild, fu 7/18 4.0, slightly larger     Cancer, metastatic to bone (H)     due to myeloma     Colonic polyp 2008    adenomatous, fu 2013 tics only     Compression fracture 2016    multiple areas of spine     Dry eyes      Elevated MCV 2015    b12 and folic acid nl     HTN (hypertension) 2000    off meds for years     Lung nodule 08/2018    on ct, 4mm, ct done for fu abnl cxr     Menorrhagia 2002    hysteroscopy and d and c done     MGUS (monoclonal gammopathy of unknown significance) 2015    eval by Dr. Roberts     Moderate to severe pulmonary hypertension (H) 03/2019    on echo     Multiple myeloma (H) 2016    dx 5/16 at [facility], bone lesions seen on mri 6/16     OAB (overactive bladder) 2013    Dr. Grullon     Osteoporosis     fu done 2010 and stable, went off meds then, fu done 2013; has had gyn fu and added evista 2013 by gyn     Palpitations 4/16    nl  echo, mildly dilated asc aorta     Paroxysmal atrial fibrillation (H) 2016    had palp and ziopatch showed it, echo nl lv fxn, mild mr and tr, added coum and toprol, toprol dose raised 16; hosp 2019 for this 3x, then had av solomon ablation and ppm      Sciatica of left side     Dr. Helen Ricardo      SVT (supraventricular tachycardia) (H)     on ziopatch     Thrombocytopenia (H)      Past Surgical History:   Procedure Laterality Date     BONE MARROW BIOPSY, BONE SPECIMEN, NEEDLE/TROCAR N/A 2016    Procedure: BIOPSY BONE MARROW;  Surgeon: Bryan Patel MD;  Location:  GI     CATARACT IOL, RT/LT        SECTION  ,      COLONOSCOPY  2013    Procedure: COLONOSCOPY;  COLONOSCOPY;  Surgeon: Steffany Rockwell MD;  Location:  GI     EP ABLATION AV NODE N/A 2019    Procedure: EP Ablation AV Node;  Surgeon: Lee Menchaca MD;  Location:  HEART CARDIAC CATH LAB     EP PACEMAKER N/A 2019    Procedure: EP Pacemaker;  Surgeon: Lee Menchaca MD;  Location:  HEART CARDIAC CATH LAB     EXCISE EXOSTOSIS TIBIA / FIBULA  2014    Procedure: EXCISE EXOSTOSIS TIBIA / FIBULA;  Surgeon: Naila Pichardo MD;  Location:  SD     hysteroscopy and d and c      due to bleeding     left anle replacement       right ankle surgery       Family History   Problem Relation Age of Onset     Heart Disease Father      C.A.D. Mother      Cerebrovascular Disease Brother      Family History Negative Sister      Family History Negative Sister      Family History Negative Brother             Allergies   Blood transfusion related (informational only) and Penicillin [penicillins]        DAYSI Wolf CNP  [facility]  Pager: [phone]      Thank you for allowing me to participate in the care of your patient.    Sincerely,     DAYSI Wolf CNP     [facility]

## 2020-08-31 NOTE — PATIENT INSTRUCTIONS
1. No medication changes today  2. Continue to monitor weight daily, notify clinic with increasing weights or worsening lower extremity edema.  3. Please call with any questions/concerns [phone]

## 2020-09-09 ENCOUNTER — TELEPHONE (OUTPATIENT)
Dept: FAMILY MEDICINE | Facility: CLINIC | Age: 85
End: 2020-09-09

## 2020-09-09 NOTE — TELEPHONE ENCOUNTER
Call from the HC RN to request change of date for next INR to Tuesday 9/15.  Noted. Susan Valadez RN September 9, 2020 2:51 PM

## 2020-09-15 ENCOUNTER — TELEPHONE (OUTPATIENT)
Dept: FAMILY MEDICINE | Facility: CLINIC | Age: 85
End: 2020-09-15

## 2020-09-15 DIAGNOSIS — I48.0 PAROXYSMAL ATRIAL FIBRILLATION (H): ICD-10-CM

## 2020-09-15 DIAGNOSIS — Z79.01 LONG TERM CURRENT USE OF ANTICOAGULANT THERAPY: ICD-10-CM

## 2020-09-15 LAB — INR PPP: 3.4 (ref 0.9–1.1)

## 2020-09-15 NOTE — TELEPHONE ENCOUNTER
Reason for Call:  INR    Who is calling?  Home Care: [facility]    Phone number:  [phone]    Fax number:      Name of caller: Jessica    INR Value:  3.4    Are there any other concerns:  No    Can we leave a detailed message on this number? YES    Phone number patient can be reached at:       Call taken on 9/15/2020 at 1:36 PM by Mario Mark

## 2020-09-15 NOTE — TELEPHONE ENCOUNTER
ANTICOAGULATION MANAGEMENT     Patient Name:  Amira Arreola  Date:  9/15/2020    ASSESSMENT /SUBJECTIVE:    Today's INR result of 3.4 is supratherapeutic. Goal INR of 2.0-3.0      Warfarin dose taken: Warfarin taken as previously instructed    Diet: No new diet changes affecting INR    Medication changes/ interactions: No new medications/supplements affecting INR    Previous INR: Therapeutic     S/S of bleeding or thromboembolism: No    New injury or illness: No    Upcoming surgery, procedure or cardioversion: No    Additional findings: None      PLAN:    Spoke with JOHNNIE Lopez, regarding INR result and instructed:     Warfarin Dosing Instructions: 2mg tonight then continue your current warfarin dose of 2 mg on friday and 4 mg all other days    Instructed patient to follow up no later than: 2 weeks  Orders given to  Homecare nurse/facility to recheck    Education provided: None required      Jessica, home care, verbalizes understanding and agrees to warfarin dosing plan.    Instructed to call the Anticoagulation Clinic for any changes, questions or concerns. (#[phone])        Suyapa Walton RN      OBJECTIVE:  Recent labs: (last 7 days)     09/15/20   INR 3.4*             Anticoagulation Summary  As of 9/15/2020    INR goal:   2.0-3.0   TTR:   81.1 % (1 y)   INR used for dosing:   3.4! (9/15/2020)   Warfarin maintenance plan:   2 mg (4 mg x 0.5) every Fri; 4 mg (4 mg x 1) all other days   Full warfarin instructions:   9/15: 2 mg; Otherwise 2 mg every Fri; 4 mg all other days   Weekly warfarin total:   26 mg   Plan last modified:   Jenifer Schultz RN (8/19/2020)   Next INR check:   9/29/2020   Priority:   Maintenance   Target end date:   Indefinite    Indications    Long term current use of anticoagulant therapy [Z79.01]  Atrial fibrillation (H) [I48.91] (Resolved) [I48.91]  Paroxysmal atrial fibrillation (H) [I48.0]             Anticoagulation Episode Summary     INR check location:       Preferred lab:    EXTERNAL LAB    Send INR reminders to:   DANTE SUMNER    Comments:    Rajiv RN [facility] [phone] private pay nursing visits to check INR      Anticoagulation Care Providers     Provider Role Specialty Phone number    Addy Frias MD Referring Internal Medicine [phone]

## 2020-09-22 ENCOUNTER — TELEPHONE (OUTPATIENT)
Dept: CARDIOLOGY | Facility: CLINIC | Age: 85
End: 2020-09-22

## 2020-09-22 NOTE — TELEPHONE ENCOUNTER
Wellness Screening Tool    Symptom Screening:    Do you have one of the following NEW symptoms:      Fever (subjective or >100.0)?  No    New cough? No    Shortness of breath? No    Chills? No    New loss of taste or smell? No    Generalized body aches? No    New persistent headache? No    New sore throat? No    Nausea, vomiting or diarrhea? No    Within the past 2 weeks, have you been exposed to someone with a known positive illness below?      COVID - 19 (known or suspected) No    Chicken pox?  No    Measles? No    Pertussis? No    Have you had a positive COVID-19 diagnostic test (nasal swab test) in the last 14 days or are you currently   on self-quarantine restrictions (i.e.travel restriction, exposure, etc?) No        Patient notified of visitor restriction: Yes  Patient informed to wear a mask: Yes    Patient's appointment status: Patient will be seen in clinic as scheduled on 9/23/20

## 2020-09-23 ENCOUNTER — ANCILLARY PROCEDURE (OUTPATIENT)
Dept: CARDIOLOGY | Facility: CLINIC | Age: 85
End: 2020-09-23
Attending: INTERNAL MEDICINE
Payer: MEDICARE

## 2020-09-23 DIAGNOSIS — Z98.890 HX OF ATRIOVENTRICULAR NODE ABLATION: ICD-10-CM

## 2020-09-23 DIAGNOSIS — Z95.0 CARDIAC PACEMAKER IN SITU: ICD-10-CM

## 2020-09-23 DIAGNOSIS — Z95.0 CARDIAC PACEMAKER IN SITU: Primary | ICD-10-CM

## 2020-09-23 PROCEDURE — 93279 PRGRMG DEV EVAL PM/LDLS PM: CPT | Performed by: INTERNAL MEDICINE

## 2020-09-24 LAB
MDC_IDC_LEAD_IMPLANT_DT: NORMAL
MDC_IDC_LEAD_LOCATION: NORMAL
MDC_IDC_LEAD_LOCATION_DETAIL_1: NORMAL
MDC_IDC_LEAD_MFG: NORMAL
MDC_IDC_LEAD_MODEL: NORMAL
MDC_IDC_LEAD_POLARITY_TYPE: NORMAL
MDC_IDC_LEAD_SERIAL: NORMAL
MDC_IDC_MSMT_BATTERY_STATUS: NORMAL
MDC_IDC_MSMT_LEADCHNL_RV_IMPEDANCE_VALUE: 772 OHM
MDC_IDC_MSMT_LEADCHNL_RV_PACING_THRESHOLD_AMPLITUDE: 0.9 V
MDC_IDC_MSMT_LEADCHNL_RV_PACING_THRESHOLD_PULSEWIDTH: 0.4 MS
MDC_IDC_MSMT_LEADCHNL_RV_SENSING_INTR_AMPL: NORMAL
MDC_IDC_PG_IMPLANT_DTM: NORMAL
MDC_IDC_PG_MFG: NORMAL
MDC_IDC_PG_MODEL: NORMAL
MDC_IDC_PG_SERIAL: NORMAL
MDC_IDC_PG_TYPE: NORMAL
MDC_IDC_SESS_CLINIC_NAME: NORMAL
MDC_IDC_SESS_DTM: NORMAL
MDC_IDC_SESS_TYPE: NORMAL
MDC_IDC_SET_BRADY_LOWRATE: 70 {BEATS}/MIN
MDC_IDC_SET_BRADY_MAX_SENSOR_RATE: 130 {BEATS}/MIN
MDC_IDC_SET_BRADY_MODE: NORMAL
MDC_IDC_SET_LEADCHNL_RA_SENSING_ADAPTATION_MODE: NORMAL
MDC_IDC_SET_LEADCHNL_RA_SENSING_POLARITY: NORMAL
MDC_IDC_SET_LEADCHNL_RA_SENSING_SENSITIVITY: 0.75 MV
MDC_IDC_SET_LEADCHNL_RV_PACING_AMPLITUDE: 1.4 V
MDC_IDC_SET_LEADCHNL_RV_PACING_CAPTURE_MODE: NORMAL
MDC_IDC_SET_LEADCHNL_RV_PACING_POLARITY: NORMAL
MDC_IDC_SET_LEADCHNL_RV_PACING_PULSEWIDTH: 0.4 MS
MDC_IDC_SET_LEADCHNL_RV_SENSING_ADAPTATION_MODE: NORMAL
MDC_IDC_SET_LEADCHNL_RV_SENSING_POLARITY: NORMAL
MDC_IDC_SET_LEADCHNL_RV_SENSING_SENSITIVITY: 2.5 MV
MDC_IDC_SET_ZONE_DETECTION_INTERVAL: 375 MS
MDC_IDC_SET_ZONE_TYPE: NORMAL
MDC_IDC_SET_ZONE_VENDOR_TYPE: NORMAL
MDC_IDC_STAT_EPISODE_RECENT_COUNT: 0
MDC_IDC_STAT_EPISODE_RECENT_COUNT_DTM_END: NORMAL
MDC_IDC_STAT_EPISODE_RECENT_COUNT_DTM_START: NORMAL
MDC_IDC_STAT_EPISODE_TOTAL_COUNT: 0
MDC_IDC_STAT_EPISODE_TOTAL_COUNT_DTM_END: NORMAL
MDC_IDC_STAT_EPISODE_TYPE: NORMAL
MDC_IDC_STAT_EPISODE_TYPE: NORMAL
MDC_IDC_STAT_EPISODE_VENDOR_TYPE: NORMAL
MDC_IDC_STAT_EPISODE_VENDOR_TYPE: NORMAL

## 2020-09-29 ENCOUNTER — ANTICOAGULATION THERAPY VISIT (OUTPATIENT)
Dept: FAMILY MEDICINE | Facility: CLINIC | Age: 85
End: 2020-09-29

## 2020-09-29 DIAGNOSIS — I48.0 PAROXYSMAL ATRIAL FIBRILLATION (H): ICD-10-CM

## 2020-09-29 DIAGNOSIS — Z79.01 LONG TERM CURRENT USE OF ANTICOAGULANT THERAPY: ICD-10-CM

## 2020-09-29 LAB — INR PPP: 3.3 (ref 0.9–1.1)

## 2020-09-29 NOTE — PROGRESS NOTES
ANTICOAGULATION MANAGEMENT     Patient Name:  Amira Arreola  Date:  9/29/2020    ASSESSMENT /SUBJECTIVE:    Today's INR result of 3.3 is supratherapeutic. Goal INR of 2.0-3.0      Warfarin dose taken: Warfarin taken as instructed    Diet: No new diet changes affecting INR    Medication changes/ interactions: No new medications/supplements affecting INR    Previous INR: Supratherapeutic     S/S of bleeding or thromboembolism: No    New injury or illness: No    Upcoming surgery, procedure or cardioversion: No    Additional findings: None      PLAN:    Telephone call with home care nurse JOHNNIE Lopez regarding INR result and instructed:     Warfarin Dosing Instructions: Change your warfarin dose to 2 mg tues/fri and 4 mg all other days    Instructed patient to follow up no later than: 1 week  Orders given to  Homecare nurse/facility to recheck    Education provided: None required      Jessica home care, verbalizes understanding and agrees to warfarin dosing plan.    Instructed to call the Anticoagulation Clinic for any changes, questions or concerns. (#[phone])        Suyapa Walton RN      OBJECTIVE:  Recent labs: (last 7 days)     09/29/20   INR 3.3*         No question data found.  Anticoagulation Summary  As of 9/29/2020    INR goal:   2.0-3.0   TTR:   77.2 % (1 y)   INR used for dosing:   3.3! (9/29/2020)   Warfarin maintenance plan:   2 mg (4 mg x 0.5) every Tue, Fri; 4 mg (4 mg x 1) all other days   Full warfarin instructions:   2 mg every Tue, Fri; 4 mg all other days   Weekly warfarin total:   24 mg   Plan last modified:   Suyapa Walton RN (9/29/2020)   Next INR check:      Priority:   Maintenance   Target end date:   Indefinite    Indications    Long term current use of anticoagulant therapy [Z79.01]  Atrial fibrillation (H) [I48.91] (Resolved) [I48.91]  Paroxysmal atrial fibrillation (H) [I48.0]             Anticoagulation Episode Summary     INR check location:       Preferred lab:   EXTERNAL LAB     Send INR reminders to:   DANTE SUMNER    Comments:    Rajiv RN [facility] [phone] private pay nursing visits to check INR      Anticoagulation Care Providers     Provider Role Specialty Phone number    Addy Frias MD Referring Internal Medicine [phone]

## 2020-09-30 ENCOUNTER — INFUSION THERAPY VISIT (OUTPATIENT)
Dept: INFUSION THERAPY | Facility: CLINIC | Age: 85
End: 2020-09-30
Attending: INTERNAL MEDICINE
Payer: MEDICARE

## 2020-09-30 ENCOUNTER — HOSPITAL ENCOUNTER (OUTPATIENT)
Facility: CLINIC | Age: 85
Setting detail: SPECIMEN
Discharge: HOME | End: 2020-09-30
Attending: INTERNAL MEDICINE | Admitting: INTERNAL MEDICINE
Payer: MEDICARE

## 2020-09-30 DIAGNOSIS — C90.00 MULTIPLE MYELOMA NOT HAVING ACHIEVED REMISSION (H): Primary | ICD-10-CM

## 2020-09-30 DIAGNOSIS — Z95.828 PORT-A-CATH IN PLACE: ICD-10-CM

## 2020-09-30 LAB
ANION GAP SERPL CALCULATED.3IONS-SCNC: 5 MMOL/L (ref 3–14)
BUN SERPL-MCNC: 14 MG/DL (ref 7–30)
CALCIUM SERPL-MCNC: 9.3 MG/DL (ref 8.5–10.1)
CHLORIDE SERPL-SCNC: 109 MMOL/L (ref 94–109)
CO2 SERPL-SCNC: 24 MMOL/L (ref 20–32)
CREAT SERPL-MCNC: 0.69 MG/DL (ref 0.52–1.04)
ERYTHROCYTE [DISTWIDTH] IN BLOOD BY AUTOMATED COUNT: 15.8 % (ref 10–15)
GFR SERPL CREATININE-BSD FRML MDRD: 78 ML/MIN/{1.73_M2}
GLUCOSE SERPL-MCNC: 130 MG/DL (ref 70–99)
HCT VFR BLD AUTO: 35.7 % (ref 35–47)
HGB BLD-MCNC: 11.1 G/DL (ref 11.7–15.7)
MCH RBC QN AUTO: 26.7 PG (ref 26.5–33)
MCHC RBC AUTO-ENTMCNC: 31.1 G/DL (ref 31.5–36.5)
MCV RBC AUTO: 86 FL (ref 78–100)
PLATELET # BLD AUTO: 217 10E9/L (ref 150–450)
POTASSIUM SERPL-SCNC: 4 MMOL/L (ref 3.4–5.3)
RBC # BLD AUTO: 4.15 10E12/L (ref 3.8–5.2)
SODIUM SERPL-SCNC: 138 MMOL/L (ref 133–144)
WBC # BLD AUTO: 8 10E9/L (ref 4–11)

## 2020-09-30 PROCEDURE — 25000128 H RX IP 250 OP 636: Performed by: INTERNAL MEDICINE

## 2020-09-30 PROCEDURE — 86334 IMMUNOFIX E-PHORESIS SERUM: CPT | Performed by: INTERNAL MEDICINE

## 2020-09-30 PROCEDURE — 84165 PROTEIN E-PHORESIS SERUM: CPT | Performed by: INTERNAL MEDICINE

## 2020-09-30 PROCEDURE — 85027 COMPLETE CBC AUTOMATED: CPT | Performed by: INTERNAL MEDICINE

## 2020-09-30 PROCEDURE — 82784 ASSAY IGA/IGD/IGG/IGM EACH: CPT | Performed by: INTERNAL MEDICINE

## 2020-09-30 PROCEDURE — 80048 BASIC METABOLIC PNL TOTAL CA: CPT | Performed by: INTERNAL MEDICINE

## 2020-09-30 PROCEDURE — 83883 ASSAY NEPHELOMETRY NOT SPEC: CPT | Performed by: INTERNAL MEDICINE

## 2020-09-30 PROCEDURE — 36591 DRAW BLOOD OFF VENOUS DEVICE: CPT

## 2020-09-30 PROCEDURE — 00000402 ZZHCL STATISTIC TOTAL PROTEIN: Performed by: INTERNAL MEDICINE

## 2020-09-30 RX ORDER — HEPARIN SODIUM (PORCINE) LOCK FLUSH IV SOLN 100 UNIT/ML 100 UNIT/ML
500 SOLUTION INTRAVENOUS EVERY 8 HOURS
Status: CANCELLED
Start: 2020-09-30

## 2020-09-30 RX ORDER — HEPARIN SODIUM (PORCINE) LOCK FLUSH IV SOLN 100 UNIT/ML 100 UNIT/ML
500 SOLUTION INTRAVENOUS EVERY 8 HOURS
Status: DISCONTINUED | OUTPATIENT
Start: 2020-09-30 | End: 2020-09-30 | Stop reason: HOSPADM

## 2020-09-30 RX ADMIN — Medication 500 UNITS: at 12:33

## 2020-10-01 LAB
ALBUMIN SERPL ELPH-MCNC: 3.6 G/DL (ref 3.7–5.1)
ALPHA1 GLOB SERPL ELPH-MCNC: 0.3 G/DL (ref 0.2–0.4)
ALPHA2 GLOB SERPL ELPH-MCNC: 0.8 G/DL (ref 0.5–0.9)
B-GLOBULIN SERPL ELPH-MCNC: 0.7 G/DL (ref 0.6–1)
GAMMA GLOB SERPL ELPH-MCNC: 0.4 G/DL (ref 0.7–1.6)
IGA SERPL-MCNC: 37 MG/DL (ref 84–499)
IGG SERPL-MCNC: 420 MG/DL (ref 610–1616)
IGM SERPL-MCNC: 29 MG/DL (ref 35–242)
KAPPA LC UR-MCNC: 6.15 MG/DL (ref 0.33–1.94)
KAPPA LC/LAMBDA SER: 16.62 {RATIO} (ref 0.26–1.65)
LAMBDA LC SERPL-MCNC: 0.37 MG/DL (ref 0.57–2.63)
M PROTEIN SERPL ELPH-MCNC: 0 G/DL
PROT PATTERN SERPL ELPH-IMP: ABNORMAL
PROT PATTERN SERPL IFE-IMP: ABNORMAL

## 2020-10-07 ENCOUNTER — VIRTUAL VISIT (OUTPATIENT)
Dept: ONCOLOGY | Facility: CLINIC | Age: 85
End: 2020-10-07
Attending: INTERNAL MEDICINE
Payer: MEDICARE

## 2020-10-07 DIAGNOSIS — G89.3 CANCER ASSOCIATED PAIN: ICD-10-CM

## 2020-10-07 DIAGNOSIS — C90.00 MULTIPLE MYELOMA NOT HAVING ACHIEVED REMISSION (H): ICD-10-CM

## 2020-10-07 PROCEDURE — 99442 PR PHYSICIAN TELEPHONE EVALUATION 11-20 MIN: CPT | Mod: 95 | Performed by: INTERNAL MEDICINE

## 2020-10-07 PROCEDURE — 999N001193 HC VIDEO/TELEPHONE VISIT; NO CHARGE

## 2020-10-07 RX ORDER — DEXAMETHASONE 4 MG/1
TABLET ORAL
Qty: 20 TABLET | Refills: 4 | Status: SHIPPED | OUTPATIENT
Start: 2020-10-07 | End: 2020-10-10

## 2020-10-07 NOTE — LETTER
"    10/7/2020         RE: Amira Arreola  [address]  [facility] [address]        Dear Colleague,    Thank you for referring your patient, Amira Arreola, to the [facility] [place]. Please see a copy of my visit note below.    This office note has been dictated.        Amira Arreola is a 88 year old female who is being evaluated via a billable telephone visit.      The patient has been notified of following:     \"This telephone visit will be conducted via a call between you and your physician/provider. We have found that certain health care needs can be provided without the need for a physical exam.  This service lets us provide the care you need with a short phone conversation.  If a prescription is necessary we can send it directly to your pharmacy.  If lab work is needed we can place an order for that and you can then stop by our lab to have the test done at a later time.    Telephone visits are billed at different rates depending on your insurance coverage. During this emergency period, for some insurers they may be billed the same as an in-person visit.  Please reach out to your insurance provider with any questions.    If during the course of the call the physician/provider feels a telephone visit is not appropriate, you will not be charged for this service.\"    Patient has given verbal consent for Telephone visit?  Yes    What phone number would you like to be contacted at? [phone]    How would you like to obtain your AVS? 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     1. On 09/21/2015, WBC of 4.2, hemoglobin of 13.2 and platelets of 138.    -On 09/29/2015, SPEP does not reveal any M-spike.   -On 10/02/2015, JANET does not reveal any monoclonal protein.     -On 10/22/2015, urine immunofixation reveals monoclonal free kappa light chain. "    2. On 05/11/2016, kappa light chain of 50, lambda light chain of 0.32 and ratio of kappa to lambda of 156.2.  3. Bone marrow biopsy on 05/25/2016 reveals 40-50% kappa light chain restricted plasma cells.  Cytogenetics is normal. FISH panel reveals translocation 11;14.    4. MRI of bones on 06/21/2016 and 06/22/2016 reveals myeloma lesions.  5. On 08/24/2016, she was started on revlimid with dexamethasone 20 mg weekly. She did not have any significant response to treatment.   6. Velcade and dexamethasone started on 03/21/2017.    7. Daratumumab added to velcade and dexamethasone on 05/31/2017.   -Velcade given every 14 days starting 08/01/2018.  -Treatment on hold. Last Velcade was on 06/05/2019.  Last daratumumab was on 05/22/2019. Dexamethasone continued.  8. On 05/22/2019, kappa free light chain of 1.21.      SUBJECTIVE:  Mrs. Arreola is an 88-year-old female with kappa free light chain multiple myeloma.  Her treatment is on hold except for dexamethasone 20 mg once a week.      Labs on 09/30/2020 reveal stable myeloma.  Serum protein electrophoresis and serum immunofixation are normal.  [place] free light chain is stable at 6.1.  Her calcium and creatinine are normal.  Hemoglobin is mildly low at 11.1.      The patient overall is doing well for her age.  She has some back pain.  Usually Tylenol helps.  She rarely uses oxycodone.      She has fatigue.  She has not been falling down.  No headache or dizziness.  No chest pain or shortness of breath.  No nausea or vomiting.  Appetite is fairly good.  No fever or chills.      Son was on the phone.  As per him also, patient overall remaining stable.      All other review of systems negative.      PHYSICAL EXAMINATION:   GENERAL:  He is alert, oriented x 3.   This is a telephone visit.      LABORATORY DATA:  Reviewed.      ASSESSMENT:   1.  An 88-year-old female with kappa free light chain multiple myeloma.  Myeloma is stable.   2.  Chronic back pain controlled on  Tylenol.   3.  Fatigue secondary to her age and myeloma.   4.  Mild normocytic anemia.      PLAN:   1.  Labs were reviewed with the patient and family.  I explained to them that her myeloma is stable.  She was happy to know that.  She will continue on weekly dexamethasone 20 mg a day.  No plan to add any other treatment at this time. She is on prophylactic acyclovir.  She will continue on that.   2.  For pain, she will continue taking Tylenol as needed.  She will take oxycodone if needed.   3.  We have stopped the Zometa.  In future, if there is significant progression, Zometa will be resumed.   4.  I will see her in 3 months' time with labs.  Advised to call us if there is worsening pain, weight loss, lump, recurrent infection, or any other concerns.         ITZ LOPEZ MD             D: 10/07/2020   T: 10/07/2020   MT: NICOLA      Name:     ALBERTO PARSON   MRN:      [id number]        Account:      [id number]   :      1932           Visit Date:   10/07/2020      Document: [id number]      Telephone visit for 11 minutes.        Again, thank you for allowing me to participate in the care of your patient.        Sincerely,        Itz Lopez MD

## 2020-10-07 NOTE — PATIENT INSTRUCTIONS
1. Continue weekly dexamethasone.  2. Continue acyclovir.  3. Follow up in 3 months with labs. Labs to be done few days before appointment.    Please call patient to schedule

## 2020-10-07 NOTE — PROGRESS NOTES
"Amira Arreola is a 88 year old female who is being evaluated via a billable telephone visit.      The patient has been notified of following:     \"This telephone visit will be conducted via a call between you and your physician/provider. We have found that certain health care needs can be provided without the need for a physical exam.  This service lets us provide the care you need with a short phone conversation.  If a prescription is necessary we can send it directly to your pharmacy.  If lab work is needed we can place an order for that and you can then stop by our lab to have the test done at a later time.    Telephone visits are billed at different rates depending on your insurance coverage. During this emergency period, for some insurers they may be billed the same as an in-person visit.  Please reach out to your insurance provider with any questions.    If during the course of the call the physician/provider feels a telephone visit is not appropriate, you will not be charged for this service.\"    Patient has given verbal consent for Telephone visit?  Yes    What phone number would you like to be contacted at? [phone]    How would you like to obtain your AVS? Mail a copy    Camila Mishra CMA    "

## 2020-10-07 NOTE — LETTER
"    10/7/2020         RE: Amira Arreola  [address]  [facility] [address]        Dear Colleague,    Thank you for referring your patient, Amira Arreola, to the [facility] [place]. Please see a copy of my visit note below.    This office note has been dictated.        Amira Arreola is a 88 year old female who is being evaluated via a billable telephone visit.      The patient has been notified of following:     \"This telephone visit will be conducted via a call between you and your physician/provider. We have found that certain health care needs can be provided without the need for a physical exam.  This service lets us provide the care you need with a short phone conversation.  If a prescription is necessary we can send it directly to your pharmacy.  If lab work is needed we can place an order for that and you can then stop by our lab to have the test done at a later time.    Telephone visits are billed at different rates depending on your insurance coverage. During this emergency period, for some insurers they may be billed the same as an in-person visit.  Please reach out to your insurance provider with any questions.    If during the course of the call the physician/provider feels a telephone visit is not appropriate, you will not be charged for this service.\"    Patient has given verbal consent for Telephone visit?  Yes    What phone number would you like to be contacted at? [phone]    How would you like to obtain your AVS? 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     1. On 09/21/2015, WBC of 4.2, hemoglobin of 13.2 and platelets of 138.    -On 09/29/2015, SPEP does not reveal any M-spike.   -On 10/02/2015, JANET does not reveal any monoclonal protein.     -On 10/22/2015, urine immunofixation reveals monoclonal free kappa light chain. "    2. On 05/11/2016, kappa light chain of 50, lambda light chain of 0.32 and ratio of kappa to lambda of 156.2.  3. Bone marrow biopsy on 05/25/2016 reveals 40-50% kappa light chain restricted plasma cells.  Cytogenetics is normal. FISH panel reveals translocation 11;14.    4. MRI of bones on 06/21/2016 and 06/22/2016 reveals myeloma lesions.  5. On 08/24/2016, she was started on revlimid with dexamethasone 20 mg weekly. She did not have any significant response to treatment.   6. Velcade and dexamethasone started on 03/21/2017.    7. Daratumumab added to velcade and dexamethasone on 05/31/2017.   -Velcade given every 14 days starting 08/01/2018.  -Treatment on hold. Last Velcade was on 06/05/2019.  Last daratumumab was on 05/22/2019. Dexamethasone continued.  8. On 05/22/2019, kappa free light chain of 1.21.      SUBJECTIVE:  Mrs. Arreola is an 88-year-old female with kappa free light chain multiple myeloma.  Her treatment is on hold except for dexamethasone 20 mg once a week.      Labs on 09/30/2020 reveal stable myeloma.  Serum protein electrophoresis and serum immunofixation are normal.  [place] free light chain is stable at 6.1.  Her calcium and creatinine are normal.  Hemoglobin is mildly low at 11.1.      The patient overall is doing well for her age.  She has some back pain.  Usually Tylenol helps.  She rarely uses oxycodone.      She has fatigue.  She has not been falling down.  No headache or dizziness.  No chest pain or shortness of breath.  No nausea or vomiting.  Appetite is fairly good.  No fever or chills.      Son was on the phone.  As per him also, patient overall remaining stable.      All other review of systems negative.      PHYSICAL EXAMINATION:   GENERAL:  He is alert, oriented x 3.   This is a telephone visit.      LABORATORY DATA:  Reviewed.      ASSESSMENT:   1.  An 88-year-old female with kappa free light chain multiple myeloma.  Myeloma is stable.   2.  Chronic back pain controlled on  Tylenol.   3.  Fatigue secondary to her age and myeloma.   4.  Mild normocytic anemia.      PLAN:   1.  Labs were reviewed with the patient and family.  I explained to them that her myeloma is stable.  She was happy to know that.  She will continue on weekly dexamethasone 20 mg a day.  No plan to add any other treatment at this time. She is on prophylactic acyclovir.  She will continue on that.   2.  For pain, she will continue taking Tylenol as needed.  She will take oxycodone if needed.   3.  We have stopped the Zometa.  In future, if there is significant progression, Zometa will be resumed.   4.  I will see her in 3 months' time with labs.  Advised to call us if there is worsening pain, weight loss, lump, recurrent infection, or any other concerns.         ITZ LOPEZ MD             D: 10/07/2020   T: 10/07/2020   MT: NICOLA      Name:     ALBERTO PARSON   MRN:      [id number]        Account:      [id number]   :      1932           Visit Date:   10/07/2020      Document: [id number]      Telephone visit for 11 minutes.        Again, thank you for allowing me to participate in the care of your patient.        Sincerely,        Itz Lopez MD

## 2020-10-07 NOTE — PROGRESS NOTES
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     1. On 09/21/2015, WBC of 4.2, hemoglobin of 13.2 and platelets of 138.    -On 09/29/2015, SPEP does not reveal any M-spike.   -On 10/02/2015, JANET does not reveal any monoclonal protein.     -On 10/22/2015, urine immunofixation reveals monoclonal free kappa light chain.    2. On 05/11/2016, kappa light chain of 50, lambda light chain of 0.32 and ratio of kappa to lambda of 156.2.  3. Bone marrow biopsy on 05/25/2016 reveals 40-50% kappa light chain restricted plasma cells.  Cytogenetics is normal. FISH panel reveals translocation 11;14.    4. MRI of bones on 06/21/2016 and 06/22/2016 reveals myeloma lesions.  5. On 08/24/2016, she was started on revlimid with dexamethasone 20 mg weekly. She did not have any significant response to treatment.   6. Velcade and dexamethasone started on 03/21/2017.    7. Daratumumab added to velcade and dexamethasone on 05/31/2017.   -Velcade given every 14 days starting 08/01/2018.  -Treatment on hold. Last Velcade was on 06/05/2019.  Last daratumumab was on 05/22/2019. Dexamethasone continued.  8. On 05/22/2019, kappa free light chain of 1.21.      SUBJECTIVE:  Mrs. Arreola is an 88-year-old female with kappa free light chain multiple myeloma.  Her treatment is on hold except for dexamethasone 20 mg once a week.      Labs on 09/30/2020 reveal stable myeloma.  Serum protein electrophoresis and serum immunofixation are normal.  [place] free light chain is stable at 6.1.  Her calcium and creatinine are normal.  Hemoglobin is mildly low at 11.1.      The patient overall is doing well for her age.  She has some back pain.  Usually Tylenol helps.  She rarely uses oxycodone.      She has fatigue.  She has not been falling down.  No headache or dizziness.  No chest pain or shortness of breath.  No nausea or vomiting.  Appetite is fairly good.  No fever or chills.       Son was on the phone.  As per him also, patient overall remaining stable.      All other review of systems negative.      PHYSICAL EXAMINATION:   GENERAL:  He is alert, oriented x 3.   This is a telephone visit.      LABORATORY DATA:  Reviewed.      ASSESSMENT:   1.  An 88-year-old female with kappa free light chain multiple myeloma.  Myeloma is stable.   2.  Chronic back pain controlled on Tylenol.   3.  Fatigue secondary to her age and myeloma.   4.  Mild normocytic anemia.      PLAN:   1.  Labs were reviewed with the patient and family.  I explained to them that her myeloma is stable.  She was happy to know that.  She will continue on weekly dexamethasone 20 mg a day.  No plan to add any other treatment at this time. She is on prophylactic acyclovir.  She will continue on that.   2.  For pain, she will continue taking Tylenol as needed.  She will take oxycodone if needed.   3.  We have stopped the Zometa.  In future, if there is significant progression, Zometa will be resumed.   4.  I will see her in 3 months' time with labs.  Advised to call us if there is worsening pain, weight loss, lump, recurrent infection, or any other concerns.         ITZ LOPEZ MD             D: 10/07/2020   T: 10/07/2020   MT: NICOLA      Name:     ALBERTO PARSON   MRN:      [id number]        Account:      [id number]   :      1932           Visit Date:   10/07/2020      Document: [id number]      Telephone visit for 11 minutes.

## 2020-10-08 ENCOUNTER — TELEPHONE (OUTPATIENT)
Dept: FAMILY MEDICINE | Facility: CLINIC | Age: 85
End: 2020-10-08

## 2020-10-08 DIAGNOSIS — I48.0 PAROXYSMAL ATRIAL FIBRILLATION (H): ICD-10-CM

## 2020-10-08 DIAGNOSIS — Z79.01 LONG TERM CURRENT USE OF ANTICOAGULANT THERAPY: ICD-10-CM

## 2020-10-08 LAB — INR PPP: 2.5 (ref 0.9–1.1)

## 2020-10-08 NOTE — TELEPHONE ENCOUNTER
Anticoagulation Management    Unable to reach [facility], home care, today.    Today's INR result of 2.5 is therapeutic (goal INR of 2.0-3.0).  Result received from: Home Care    Follow up required to confirm warfarin dose taken and assess for changes    [facility]      [facility]ticoagulation clinic to follow up    Suyapa Walton RN

## 2020-10-08 NOTE — TELEPHONE ENCOUNTER
Reason for Call:  INR    Who is calling?  Home Care: [facility]    Phone number:  [phone]    Fax number:      Name of caller: Jessica    INR Value:  2.5    Are there any other concerns:  No    Can we leave a detailed message on this number? YES        Call taken on 10/8/2020 at 2:43 PM by Joslyn Dunham

## 2020-10-08 NOTE — TELEPHONE ENCOUNTER
ANTICOAGULATION MANAGEMENT     Patient Name:  Amira Arreola  Date:  10/8/2020    ASSESSMENT /SUBJECTIVE:    Today's INR result of 2.5 is therapeutic. Goal INR of 2.0-3.0      Warfarin dose taken: Warfarin taken as instructed    Diet: No new diet changes affecting INR    Medication changes/ interactions: No new medications/supplements affecting INR    Previous INR: Supratherapeutic     S/S of bleeding or thromboembolism: No    New injury or illness: No    Upcoming surgery, procedure or cardioversion: No    Additional findings: None      PLAN:    Telephone call with home care nurse Jessica regarding INR result and instructed:     Warfarin Dosing Instructions: Continue your current warfarin dose 2 mg on tues/fri and 4 mg all other days    Instructed patient to follow up no later than: 2 weeks  Orders given to  Homecare nurse/facility to recheck    Education provided: None required      Jessica verbalizes understanding and agrees to warfarin dosing plan.    Instructed to call the Anticoagulation Clinic for any changes, questions or concerns. (#[phone])        Suyapa Walton RN      OBJECTIVE:  Recent labs: (last 7 days)     10/08/20   INR 2.5*             Anticoagulation Summary  As of 10/8/2020    INR goal:  2.0-3.0   TTR:  76.3 % (1 y)   INR used for dosin.5 (10/8/2020)   Warfarin maintenance plan:  2 mg (4 mg x 0.5) every Tue, Fri; 4 mg (4 mg x 1) all other days   Full warfarin instructions:  2 mg every Tue, Fri; 4 mg all other days   Weekly warfarin total:  24 mg   Plan last modified:  Suyapa Walton RN (2020)   Next INR check:  10/22/2020   Priority:  Maintenance   Target end date:  Indefinite    Indications    Long term current use of anticoagulant therapy [Z79.01]  Atrial fibrillation (H) [I48.91] (Resolved) [I48.91]  Paroxysmal atrial fibrillation (H) [I48.0]             Anticoagulation Episode Summary     INR check location:      Preferred lab:  EXTERNAL LAB    Send INR reminders to:  DANTE  TAISHA    Comments:   Rajiv RN [facility] [phone] private pay nursing visits to check INR      Anticoagulation Care Providers     Provider Role Specialty Phone number    Addy Frias MD Referring Internal Medicine [phone]

## 2020-10-21 ENCOUNTER — TELEPHONE (OUTPATIENT)
Dept: FAMILY MEDICINE | Facility: CLINIC | Age: 85
End: 2020-10-21

## 2020-10-21 NOTE — TELEPHONE ENCOUNTER
[facility] now requests orders and shares plan of care/discharge summaries for some patients through [employer].  Please REPLY TO THIS MESSAGE OR ROUTE BACK TO THE AUTHOR in order to give authorization for orders when needed.  This is considered a verbal order, you will still receive a faxed copy of orders for signature.  Thank you for your assistance in improving collaboration for our patients.    ORDER  Recertification for private pay nursing services to include INR, cardiac, respiratory, home safety and pain management. Ok for skilled nurse visits 1M3.  Ok for [facility] nurse to administer influenza vaccine at next home care visit.

## 2020-10-22 ENCOUNTER — ANTICOAGULATION THERAPY VISIT (OUTPATIENT)
Dept: FAMILY MEDICINE | Facility: CLINIC | Age: 85
End: 2020-10-22

## 2020-10-22 DIAGNOSIS — I48.0 PAROXYSMAL ATRIAL FIBRILLATION (H): ICD-10-CM

## 2020-10-22 DIAGNOSIS — Z79.01 LONG TERM CURRENT USE OF ANTICOAGULANT THERAPY: ICD-10-CM

## 2020-10-22 LAB — INR PPP: 2.7 (ref 0.9–1.1)

## 2020-10-22 NOTE — PROGRESS NOTES
ANTICOAGULATION MANAGEMENT     Patient Name:  Amira Arreola  Date:  10/22/2020    ASSESSMENT /SUBJECTIVE:    Today's INR result of 2.7 is therapeutic. Goal INR of 2.0-3.0      Warfarin dose taken: Warfarin taken as instructed    Diet: No new diet changes affecting INR    Medication changes/ interactions: No new medications/supplements affecting INR    Previous INR: Therapeutic     S/S of bleeding or thromboembolism: No    New injury or illness: No    Upcoming surgery, procedure or cardioversion: No    Additional findings: None      PLAN:    Telephone call with home care nurse Jessica regarding INR result and instructed:     Warfarin Dosing Instructions: Continue maintenance dosing of 2 mg T, F; 4 mg ROW    Instructed patient to follow up no later than: 4 weeks  Orders given to  Homecare nurse/facility to recheck    Education provided: None required      Jessica verbalizes understanding and agrees to warfarin dosing plan.    Instructed to call the Anticoagulation Clinic for any changes, questions or concerns. (#[phone])        Allyson Dumont RN      OBJECTIVE:  Recent labs: (last 7 days)     10/22/20   INR 2.7*         INR assessment THER    Recheck INR In: 4 WEEKS    INR Location Homecare INR      Anticoagulation Summary  As of 10/22/2020    INR goal:  2.0-3.0   TTR:  76.3 % (1 y)   INR used for dosin.7 (10/22/2020)   Warfarin maintenance plan:  2 mg (4 mg x 0.5) every Tue, Fri; 4 mg (4 mg x 1) all other days   Full warfarin instructions:  2 mg every Tue, Fri; 4 mg all other days   Weekly warfarin total:  24 mg   Plan last modified:  Suyapa Walton RN (2020)   Next INR check:  2020   Priority:  Maintenance   Target end date:  Indefinite    Indications    Long term current use of anticoagulant therapy [Z79.01]  Atrial fibrillation (H) [I48.91] (Resolved) [I48.91]  Paroxysmal atrial fibrillation (H) [I48.0]             Anticoagulation Episode Summary     INR check location:      Preferred  lab:  EXTERNAL LAB    Send INR reminders to:  DANTE SUMNER    Comments:   Rajiv RN [facility] [phone] private pay nursing visits to check INR      Anticoagulation Care Providers     Provider Role Specialty Phone number    Addy Frias MD Referring Internal Medicine [phone]

## 2020-10-26 NOTE — MR AVS SNAPSHOT
Amiragino Arreola   8/3/2018   Anticoagulation Therapy Visit    Description:  86 year old female   Provider:  Addy Frias MD   Department:  [facility]           INR as of 8/3/2018     Today's INR 3.39! (8/1/2018)      Anticoagulation Summary as of 8/3/2018     INR goal 2.0-3.0   Today's INR 3.39! (8/1/2018)   Full warfarin instructions 4 mg every day   Next INR check 8/8/2018    Indications   Long-term (current) use of anticoagulants [Z79.01] [Z79.01]  Atrial fibrillation (H) [I48.91] (Resolved) [I48.91]         August 2018 Details    Sun Mon Tue Wed Thu Fri Sat        1               2               3      4 mg   See details      4      4 mg           5      4 mg         6      4 mg         7      4 mg         8            9               10               11                 12               13               14               15               16               17               18                 19               20               21               22               23               24               25                 26               27               28               29               30               31                 Date Details   08/03 This INR check       Date of next INR:  8/8/2018         How to take your warfarin dose     To take:  4 mg Take 1 of the 4 mg tablets.           
Universal Safety Interventions

## 2020-10-29 DIAGNOSIS — I50.32 CHRONIC DIASTOLIC HEART FAILURE (H): ICD-10-CM

## 2020-10-29 RX ORDER — POTASSIUM CHLORIDE 1500 MG/1
TABLET, EXTENDED RELEASE ORAL
Qty: 180 TABLET | Refills: 0 | Status: SHIPPED | OUTPATIENT
Start: 2020-10-29 | End: 2021-04-26

## 2020-11-11 RX ORDER — FUROSEMIDE 20 MG
TABLET ORAL
Qty: 270 TABLET | Refills: 0 | Status: SHIPPED | OUTPATIENT
Start: 2020-11-11 | End: 2021-03-10

## 2020-11-20 NOTE — MR AVS SNAPSHOT
After Visit Summary   4/11/2018    Amira Arreola    MRN: [id number]           Patient Information     Date Of Birth          7/17/1932        Visit Information        Provider Department      4/11/2018 10:00 AM  INFUSION CHAIR 4 [facility]        Today's Diagnoses     Multiple myeloma not having achieved remission (H)    -  1    Paroxysmal atrial fibrillation (H)           Follow-ups after your visit        Your next 10 appointments already scheduled     Apr 18, 2018  9:30 AM CDT   Level 2 with  INFUSION CHAIR 8   [facility] ([facility])    [facility]  [address]  [place] [address]   [phone]            Apr 25, 2018  9:30 AM CDT   Level 2 with  INFUSION CHAIR 18   [facility] ([facility])    [facility]  [address]  [place] [address]   [phone]            Apr 25, 2018 10:00 AM CDT   Return Visit with Shayne Roberts MD   [facility]    [facility]  [address]  [place] [address]   [phone]            Apr 30, 2018 10:15 AM CDT   Return Visit with Ramos Rivera MD   [facility] ([facility])    [address]  [place] [address] [phone] OPT 2              Who to contact     If you have questions or need follow up information about today's clinic visit or your schedule please contact [facility] directly at [phone].  Normal or non-critical lab and imaging results will be communicated to you by MyChart, letter or phone within 4 business days after the clinic has received the results. If you do not hear from us within 7 days, please contact the clinic through MyChart or phone. If you have a critical or  PSYCHOLOGY ADULT BLOOD AND BONE MARROW PRE-SCREEN      Name:  Red Pérez  Date of Service: 11/20/2020  Type of Service: Health and Behavior Initial Assessment (13076)  Duration:  15 minutes    SW met with pt and , Celine Yanes ([phone]) to introduce self and services while assessing potential barriers to care. Pt's  will be caregiver during BMT. No barriers noted. Drug and Alcohol Abuse or Dependence: None Noted    Behavioral / Emotional / Mental Health: None Noted    Family & Social Support: Present    Educational / Learning Disability: None Noted    Language Barriers: No  Required    SW provided pt with SW contact information and encouraged them to call should any needs arise. Pt verbalized understanding. SW intends to follow up PRN.     Electronically Signed By: Katalina Watkins, Via [employer] "abnormal lab result, we will notify you by phone as soon as possible.  Submit refill requests through [employer] or call your pharmacy and they will forward the refill request to us. Please allow 3 business days for your refill to be completed.          Additional Information About Your Visit        MyChart Information     [employer] lets you send messages to your doctor, view your test results, renew your prescriptions, schedule appointments and more. To sign up, go to [email or web address] . Click on \"Log in\" on the left side of the screen, which will take you to the Welcome page. Then click on \"Sign up Now\" on the right side of the page.     You will be asked to enter the access code listed below, as well as some personal information. Please follow the directions to create your username and password.     Your access code is: [email or web address]  Expires: 7/3/2018  3:28 PM     Your access code will  in 90 days. If you need help or a new code, please call your [place] clinic or [phone].        Care EveryWhere ID     This is your Care EveryWhere ID. This could be used by other organizations to access your [place] medical records  [id number]        Your Vitals Were     Pulse Temperature Respirations Height BMI (Body Mass Index)       85 98.6  F (37  C) (Oral) 16 1.664 m (5' 5.51\") 24.31 kg/m2        Blood Pressure from Last 3 Encounters:   18 118/70   18 123/72   18 126/83    Weight from Last 3 Encounters:   18 67.3 kg (148 lb 6.4 oz)   18 65.6 kg (144 lb 9.6 oz)   18 67.1 kg (148 lb)              We Performed the Following     CBC with platelets differential     Hepatic panel     INR     Kappa and lambda light chain     Protein electrophoresis        Primary Care Provider Office Phone # Fax #    Addy Frias [phone] [address]  [place] [address]        Equal Access to Services     SARA ZELAYA AH: Gissel Galloway, " vivianda iselaalexis, qaybkianna kawen gupta, hung lancasteraamorteza ah. So [facility] [phone].    ATENCIÓN: Si feli park, tiene a barlow disposición servicios gratuitos de asistencia lingüística. Kadie al [phone].    We comply with applicable federal civil rights laws and [place] laws. We do not discriminate on the basis of race, color, national origin, age, disability, sex, sexual orientation, or gender identity.            Thank you!     Thank you for choosing [facility]  for your care. Our goal is always to provide you with excellent care. Hearing back from our patients is one way we can continue to improve our services. Please take a few minutes to complete the written survey that you may receive in the mail after your visit with us. Thank you!             Your Updated Medication List - Protect others around you: Learn how to safely use, store and throw away your medicines at www.disposemymeds.org.          This list is accurate as of 4/11/18  2:38 PM.  Always use your most recent med list.                   Brand Name Dispense Instructions for use Diagnosis    acyclovir 400 MG tablet    ZOVIRAX    60 tablet    Take 1 tablet (400 mg) by mouth 2 times daily Start 1 week prior to daratumumab and continue for 3 months after treatment is complete.    Multiple myeloma not having achieved remission (H)       CALCIUM CITRATE + PO      Take 2,000 mg by mouth daily 2 tabs        carboxymethylcellulose 0.5 % Soln ophthalmic solution    REFRESH PLUS     1 drop 4 times daily        CLARINEX PO      Take by mouth daily Taking claritin        COMPAZINE PO      Take 10 mg by mouth daily as needed        cycloSPORINE 0.05 % ophthalmic emulsion    RESTASIS     Place 1 drop into both eyes every 12 hours        DAILY MULTIVITAMIN PO      Take 1 tablet by mouth daily.    Routine general medical examination at a health care facility       dexamethasone 4 MG tablet    DECADRON     28 tablet    Take 20mg (5 tablets) by mouth every week on the morning of velcade injection.    Multiple myeloma not having achieved remission (H)       erythromycin ophthalmic ointment    ROMYCIN     Place 1 Application into both eyes At Bedtime        lidocaine-prilocaine cream    EMLA    30 g    Apply topically as needed for moderate pain Apply dollop size amount to port site 30-60 min prior to accessing    Multiple myeloma not having achieved remission (H)       LORazepam 0.5 MG tablet    ATIVAN    30 tablet    Take 1 tablet (0.5 mg) by mouth every 8 hours as needed for anxiety    Multiple myeloma not having achieved remission (H)       metoprolol succinate 50 MG 24 hr tablet    TOPROL-XL    270 tablet    TAKE 2 TABLETS BY MOUTH EVERY MORNING, AND 1 TABLET EVERY EVENING    Paroxysmal atrial fibrillation (H)       oxyCODONE IR 15 MG tablet    ROXICODONE    60 tablet    Take 1 tablet (15 mg) by mouth every 8 hours as needed for pain maximum 4 tablet(s) per day    Multiple myeloma not having achieved remission (H)       polyethylene glycol powder    MIRALAX/GLYCOLAX     Take 1 capful by mouth daily as needed    Bilateral leg edema       SIMBRINZA 1-0.2 % ophthalmic suspension   Generic drug:  brinzolamide-brimonidine      Place 1 drop into the right eye 2 times daily 1 drop AM and PM        triamcinolone 0.5 % cream    KENALOG    15 g    Apply sparingly to affected area three times daily. 1-2 weeks , as needed    Rash and nonspecific skin eruption       TYLENOL PO      Take 500 mg by mouth every 6 hours as needed for mild pain or fever        UNABLE TO FIND      MEDICATION NAME: Fresh Coat eye drops        VITAMIN D3 PO      Take 1,000 Units by mouth daily        warfarin 4 MG tablet    COUMADIN    110 tablet    Take 1-2 tablets daily as directed by INR clinic.    Paroxysmal atrial fibrillation (H), Long-term (current) use of anticoagulants       ZOMETA IV      Inject into the vein every 30 days Every 3 month dosing

## 2020-11-25 ENCOUNTER — DOCUMENTATION ONLY (OUTPATIENT)
Dept: FAMILY MEDICINE | Facility: CLINIC | Age: 85
End: 2020-11-25

## 2020-11-25 NOTE — PROGRESS NOTES
ANTICOAGULATION     Amira Arreola is overdue for INR check.      left message for Jessica, home care nurse, to determine if INR is still being managed by home care    Suyapa Walton RN

## 2020-12-01 DIAGNOSIS — I50.9 ACUTE ON CHRONIC CONGESTIVE HEART FAILURE, UNSPECIFIED HEART FAILURE TYPE (H): ICD-10-CM

## 2020-12-01 DIAGNOSIS — C90.00 MULTIPLE MYELOMA NOT HAVING ACHIEVED REMISSION (H): ICD-10-CM

## 2020-12-01 RX ORDER — ACYCLOVIR 400 MG/1
400 TABLET ORAL
Qty: 60 TABLET | Refills: 4 | Status: SHIPPED | OUTPATIENT
Start: 2020-12-01 | End: 2021-04-07

## 2020-12-09 ENCOUNTER — DOCUMENTATION ONLY (OUTPATIENT)
Dept: CARE COORDINATION | Facility: CLINIC | Age: 85
End: 2020-12-09

## 2020-12-09 NOTE — PROGRESS NOTES
[facility] now requests orders and shares plan of care/discharge summaries for some patients through [employer].  Please REPLY TO THIS MESSAGE OR ROUTE BACK TO THE AUTHOR in order to give authorization for orders when needed.  This is considered a verbal order, you will still receive a faxed copy of orders for signature.  Thank you for your assistance in improving collaboration for our patients.    ORDER   Nurse visits as directed/needed x60 days, next INR due 12/18 per previous order.    RECERTIFICATION SUMMARY:  Situation: 60 reassessment completed for continued homecare services, she is currently open for nurse visits primarily for ongoing INR management.  She is alert and oriented with some forgetfullness.  She is up with use of walker, gait is slow, shuffled at times, unsteady when making turns, was able to ambulate about 25 feet before needing to take a rest.  She has chronic edema to BLEs, wears compression stockings, nonpitting edema present today.  She denies concerns with appetite.  Has occassional urinary incontinence due to overactive bladder.  Her port is maintained in clinic.  She has general understanding of her medications.  Her daughter manages medications and sets up on a weekly basis.  Her son resides with her and administers medications for her.  Her weights have been stable, mainting about 142-145lbs per home log and checks every morning.  Her INRs have been within range, last INR 2.2 on 11/18, next due 12/18.  She resides in single family home with spouse and son.  Son assists with daily cares, daughter resides next door and provides assistance.  She has private hired caregiver to assist with bathing, personal cares and light housekeeping 2 times per week.     Background: PMI includes paroxymal AFib, chronic diastolic CHF, multiple myeloma with mets to bone, chronic low back pain, overactive bladder, Pulmonary HTN, PORT, osteoporosis.  Assessment: Risk for emergent  care/hospitalization due to high falls risk, bleeing risk d/t long term anticoagulation, dependence on caregiver.   Recommendation: Nurse visits as directed/needed, billed private pay to long term care insurance, [facility], for ongoing INR management.    Rajiv Gusman RN  P: [phone]

## 2020-12-17 NOTE — PROGRESS NOTES
Received call back from Rajiv with [facility]. States pt is still getting INRs done through home care and the next one is scheduled for tomorrow, 12/18. Rajiv states the last INR pt had was 11/17. There is no anticoagulation encounter in the chart from that date. ACC will reassess and dose tomorrow (12/18) when home care calls with INR result.

## 2020-12-17 NOTE — PROGRESS NOTES
Per chart notes, hospice reports INR is next due 12/18/20.  Left message for  home care nurse who created the chart notes requesting if Hospice in house provider is now managing warfarin dosing or if INR [facility] needs to continue to manage.  (Rajiv Gusman RN  P: [phone])    Suyapa Walton RN

## 2020-12-18 ENCOUNTER — ANTICOAGULATION THERAPY VISIT (OUTPATIENT)
Dept: FAMILY MEDICINE | Facility: CLINIC | Age: 85
End: 2020-12-18

## 2020-12-18 DIAGNOSIS — I48.0 PAROXYSMAL ATRIAL FIBRILLATION (H): ICD-10-CM

## 2020-12-18 DIAGNOSIS — Z79.01 LONG TERM CURRENT USE OF ANTICOAGULANT THERAPY: ICD-10-CM

## 2020-12-18 LAB — INR PPP: 3.5 (ref 0.9–1.1)

## 2020-12-18 NOTE — PROGRESS NOTES
ANTICOAGULATION MANAGEMENT     Patient Name:  Amira Arreola  Date:  12/18/2020    ASSESSMENT /SUBJECTIVE:    Today's INR result of 3.5 is supratherapeutic. Goal INR of 2.0-3.0      Warfarin dose taken: Warfarin taken as instructed    Diet: Decreased greens/vitamin K in diet which may be affecting INR; plans to resume previous intake    Medication changes/ interactions: No new medications/supplements affecting INR    Previous INR: Therapeutic     S/S of bleeding or thromboembolism: No    New injury or illness: No    Upcoming surgery, procedure or cardioversion: No    Additional findings: None      PLAN:    Telephone call with home care nurse Rajiv, regarding INR result and instructed:     Warfarin Dosing Instructions: Hold tonight then continue your current warfarin dose of 2 mg on tues/fri and 4 mg all other days    Instructed patient to follow up no later than: 2 weeks  Orders given to  Homecare nurse/facility to recheck    Education provided: None required      Rajiv, home care, verbalizes understanding and agrees to warfarin dosing plan.    Instructed to call the Anticoagulation Clinic for any changes, questions or concerns. (#[phone])        Suyapa Walton RN      OBJECTIVE:  Recent labs: (last 7 days)     12/18/20   INR 3.5*         No question data found.  Anticoagulation Summary  As of 12/18/2020    INR goal:  2.0-3.0   TTR:  66.6 % (1 y)   INR used for dosing:  3.5 (12/18/2020)   Warfarin maintenance plan:  2 mg (4 mg x 0.5) every Tue, Fri; 4 mg (4 mg x 1) all other days   Full warfarin instructions:  2 mg every Tue, Fri; 4 mg all other days   Weekly warfarin total:  24 mg   Plan last modified:  Suyapa Walton RN (9/29/2020)   Next INR check:     Priority:  Maintenance   Target end date:  Indefinite    Indications    Long term current use of anticoagulant therapy [Z79.01]  Atrial fibrillation (H) [I48.91] (Resolved) [I48.91]  Paroxysmal atrial fibrillation (H) [I48.0]             Anticoagulation  Episode Summary     INR check location:      Preferred lab:  EXTERNAL LAB    Send INR reminders to:  DANTE SUMNER    Comments:   Rajiv RN [facility] [phone] private pay nursing visits to check INR      Anticoagulation Care Providers     Provider Role Specialty Phone number    Addy Frias MD Referring Internal Medicine [phone]

## 2020-12-28 ENCOUNTER — ANCILLARY PROCEDURE (OUTPATIENT)
Dept: CARDIOLOGY | Facility: CLINIC | Age: 85
End: 2020-12-28
Attending: INTERNAL MEDICINE
Payer: MEDICARE

## 2020-12-28 DIAGNOSIS — Z98.890 HX OF ATRIOVENTRICULAR NODE ABLATION: ICD-10-CM

## 2020-12-28 DIAGNOSIS — Z95.0 CARDIAC PACEMAKER IN SITU: ICD-10-CM

## 2020-12-28 PROCEDURE — 93294 REM INTERROG EVL PM/LDLS PM: CPT | Performed by: INTERNAL MEDICINE

## 2020-12-28 PROCEDURE — 93296 REM INTERROG EVL PM/IDS: CPT | Performed by: INTERNAL MEDICINE

## 2020-12-31 ENCOUNTER — ANTICOAGULATION THERAPY VISIT (OUTPATIENT)
Dept: FAMILY MEDICINE | Facility: CLINIC | Age: 85
End: 2020-12-31

## 2020-12-31 DIAGNOSIS — Z79.01 LONG TERM CURRENT USE OF ANTICOAGULANT THERAPY: ICD-10-CM

## 2020-12-31 DIAGNOSIS — I48.0 PAROXYSMAL ATRIAL FIBRILLATION (H): ICD-10-CM

## 2020-12-31 LAB — INR PPP: 2.5 (ref 0.9–1.1)

## 2020-12-31 NOTE — PROGRESS NOTES
ANTICOAGULATION MANAGEMENT     Patient Name:  Amira Arreola  Date:  2020    ASSESSMENT /SUBJECTIVE:    Today's INR result of 2.5 is therapeutic. Goal INR of 2.0-3.0      Warfarin dose taken: Warfarin taken as instructed and Missed dose(s) may be affecting INR    Diet: No new diet changes affecting INR    Medication changes/ interactions: No new medications/supplements affecting INR    Previous INR: Supratherapeutic     S/S of bleeding or thromboembolism: No    New injury or illness: No    Upcoming surgery, procedure or cardioversion: No    Additional findings: None      PLAN:    Telephone call with home care nurse Rajiv regarding INR result and instructed:     Warfarin Dosing Instructions: Change your warfarin dose to 2 mg on MWF and 4 mg all other days  . (8.3 % change) due to patient receiving 20 mg in the last 7 days, and 22 mg the week before with a hold and being in the middle of range    Instructed patient to follow up no later than: 2 weeks  Orders given to  Homecare nurse/facility to recheck    Education provided: None required      Rajiv, home care, verbalizes understanding and agrees to warfarin dosing plan.    Instructed to call the Anticoagulation Clinic for any changes, questions or concerns. (#[phone])        Suyapa Walton RN      OBJECTIVE:  Recent labs: (last 7 days)     20   INR 2.5*         No question data found.  Anticoagulation Summary  As of 2020    INR goal:  2.0-3.0   TTR:  64.8 % (1 y)   INR used for dosin.5 (2020)   Warfarin maintenance plan:  2 mg (4 mg x 0.5) every Mon, Wed, Fri; 4 mg (4 mg x 1) all other days   Full warfarin instructions:  2 mg every Mon, Wed, Fri; 4 mg all other days   Weekly warfarin total:  22 mg   Plan last modified:  Suyapa Walton RN (2020)   Next INR check:  2021   Priority:  Maintenance   Target end date:  Indefinite    Indications    Long term current use of anticoagulant therapy [Z79.01]  Atrial fibrillation  (H) [I48.91] (Resolved) [I48.91]  Paroxysmal atrial fibrillation (H) [I48.0]             Anticoagulation Episode Summary     INR check location:      Preferred lab:  EXTERNAL LAB    Send INR reminders to:  DANTE SUMNER    Comments:   Rajiv HENSLEY [facility] [phone] private pay nursing visits to check INR      Anticoagulation Care Providers     Provider Role Specialty Phone number    Addy Frias MD Referring Internal Medicine [phone]

## 2021-01-01 ENCOUNTER — VIRTUAL VISIT (OUTPATIENT)
Dept: ONCOLOGY | Facility: CLINIC | Age: 86
End: 2021-01-01
Attending: INTERNAL MEDICINE
Payer: MEDICARE

## 2021-01-01 ENCOUNTER — TELEPHONE (OUTPATIENT)
Dept: FAMILY MEDICINE | Facility: CLINIC | Age: 86
End: 2021-01-01
Payer: MEDICARE

## 2021-01-01 ENCOUNTER — MEDICAL CORRESPONDENCE (OUTPATIENT)
Dept: HEALTH INFORMATION MANAGEMENT | Facility: CLINIC | Age: 86
End: 2021-01-01
Payer: MEDICARE

## 2021-01-01 ENCOUNTER — ANTICOAGULATION THERAPY VISIT (OUTPATIENT)
Dept: ANTICOAGULATION | Facility: CLINIC | Age: 86
End: 2021-01-01
Payer: MEDICARE

## 2021-01-01 ENCOUNTER — TELEPHONE (OUTPATIENT)
Dept: FAMILY MEDICINE | Facility: CLINIC | Age: 86
End: 2021-01-01

## 2021-01-01 ENCOUNTER — LAB (OUTPATIENT)
Dept: INFUSION THERAPY | Facility: CLINIC | Age: 86
End: 2021-01-01
Attending: INTERNAL MEDICINE
Payer: MEDICARE

## 2021-01-01 ENCOUNTER — ANCILLARY PROCEDURE (OUTPATIENT)
Dept: CARDIOLOGY | Facility: CLINIC | Age: 86
End: 2021-01-01
Attending: INTERNAL MEDICINE
Payer: MEDICARE

## 2021-01-01 ENCOUNTER — MEDICAL CORRESPONDENCE (OUTPATIENT)
Dept: HEALTH INFORMATION MANAGEMENT | Facility: CLINIC | Age: 86
End: 2021-01-01

## 2021-01-01 ENCOUNTER — OFFICE VISIT (OUTPATIENT)
Dept: CARDIOLOGY | Facility: CLINIC | Age: 86
End: 2021-01-01
Attending: NURSE PRACTITIONER
Payer: MEDICARE

## 2021-01-01 ENCOUNTER — LAB (OUTPATIENT)
Dept: LAB | Facility: CLINIC | Age: 86
End: 2021-01-01
Payer: MEDICARE

## 2021-01-01 ENCOUNTER — HEALTH MAINTENANCE LETTER (OUTPATIENT)
Age: 86
End: 2021-01-01

## 2021-01-01 VITALS
SYSTOLIC BLOOD PRESSURE: 132 MMHG | BODY MASS INDEX: 25.76 KG/M2 | OXYGEN SATURATION: 100 % | HEIGHT: 65 IN | DIASTOLIC BLOOD PRESSURE: 78 MMHG | HEART RATE: 70 BPM | WEIGHT: 154.6 LBS

## 2021-01-01 DIAGNOSIS — I48.0 PAROXYSMAL ATRIAL FIBRILLATION (H): ICD-10-CM

## 2021-01-01 DIAGNOSIS — C90.00 MULTIPLE MYELOMA NOT HAVING ACHIEVED REMISSION (H): Primary | ICD-10-CM

## 2021-01-01 DIAGNOSIS — I27.20 MODERATE TO SEVERE PULMONARY HYPERTENSION (H): ICD-10-CM

## 2021-01-01 DIAGNOSIS — Z53.9 DIAGNOSIS NOT YET DEFINED: Primary | ICD-10-CM

## 2021-01-01 DIAGNOSIS — I48.91 ATRIAL FIBRILLATION, UNSPECIFIED TYPE (H): Primary | ICD-10-CM

## 2021-01-01 DIAGNOSIS — Z98.890 HX OF ATRIOVENTRICULAR NODE ABLATION: Primary | ICD-10-CM

## 2021-01-01 DIAGNOSIS — Z95.828 PORT-A-CATH IN PLACE: ICD-10-CM

## 2021-01-01 DIAGNOSIS — Z79.01 LONG TERM CURRENT USE OF ANTICOAGULANT THERAPY: Primary | ICD-10-CM

## 2021-01-01 DIAGNOSIS — I50.32 CHRONIC DIASTOLIC HEART FAILURE (H): ICD-10-CM

## 2021-01-01 DIAGNOSIS — I48.91 ATRIAL FIBRILLATION, UNSPECIFIED TYPE (H): ICD-10-CM

## 2021-01-01 DIAGNOSIS — Z95.0 CARDIAC PACEMAKER IN SITU: ICD-10-CM

## 2021-01-01 DIAGNOSIS — I50.32 CHRONIC DIASTOLIC HEART FAILURE (H): Primary | ICD-10-CM

## 2021-01-01 DIAGNOSIS — I44.2 COMPLETE HEART BLOCK (H): ICD-10-CM

## 2021-01-01 LAB
ALBUMIN SERPL ELPH-MCNC: 3.8 G/DL (ref 3.7–5.1)
ALPHA1 GLOB SERPL ELPH-MCNC: 0.3 G/DL (ref 0.2–0.4)
ALPHA2 GLOB SERPL ELPH-MCNC: 0.8 G/DL (ref 0.5–0.9)
ANION GAP SERPL CALCULATED.3IONS-SCNC: 5 MMOL/L (ref 3–14)
ANION GAP SERPL CALCULATED.3IONS-SCNC: 6 MMOL/L (ref 3–14)
B-GLOBULIN SERPL ELPH-MCNC: 0.7 G/DL (ref 0.6–1)
BASOPHILS # BLD AUTO: 0 10E3/UL (ref 0–0.2)
BASOPHILS NFR BLD AUTO: 0 %
BUN SERPL-MCNC: 16 MG/DL (ref 7–30)
BUN SERPL-MCNC: 17 MG/DL (ref 7–30)
CALCIUM SERPL-MCNC: 9.2 MG/DL (ref 8.5–10.1)
CALCIUM SERPL-MCNC: 9.7 MG/DL (ref 8.5–10.1)
CHLORIDE BLD-SCNC: 110 MMOL/L (ref 94–109)
CHLORIDE BLD-SCNC: 110 MMOL/L (ref 94–109)
CO2 SERPL-SCNC: 22 MMOL/L (ref 20–32)
CO2 SERPL-SCNC: 25 MMOL/L (ref 20–32)
CREAT SERPL-MCNC: 0.92 MG/DL (ref 0.52–1.04)
CREAT SERPL-MCNC: 0.94 MG/DL (ref 0.52–1.04)
EOSINOPHIL # BLD AUTO: 0 10E3/UL (ref 0–0.7)
EOSINOPHIL NFR BLD AUTO: 1 %
ERYTHROCYTE [DISTWIDTH] IN BLOOD BY AUTOMATED COUNT: 16 % (ref 10–15)
GAMMA GLOB SERPL ELPH-MCNC: 0.4 G/DL (ref 0.7–1.6)
GFR SERPL CREATININE-BSD FRML MDRD: 54 ML/MIN/1.73M2
GFR SERPL CREATININE-BSD FRML MDRD: 55 ML/MIN/1.73M2
GLUCOSE BLD-MCNC: 119 MG/DL (ref 70–99)
GLUCOSE BLD-MCNC: 99 MG/DL (ref 70–99)
HCT VFR BLD AUTO: 35.2 % (ref 35–47)
HGB BLD-MCNC: 10.7 G/DL (ref 11.7–15.7)
IMM GRANULOCYTES # BLD: 0 10E3/UL
IMM GRANULOCYTES NFR BLD: 1 %
INR (EXTERNAL): 2.1 (ref 0.9–1.1)
INR (EXTERNAL): 2.2 (ref 0.9–1.1)
INR (EXTERNAL): 2.4 (ref 0.9–1.1)
KAPPA LC FREE SER-MCNC: 13.22 MG/DL (ref 0.33–1.94)
KAPPA LC FREE/LAMBDA FREE SER NEPH: 22.41 {RATIO} (ref 0.26–1.65)
LAMBDA LC FREE SERPL-MCNC: 0.59 MG/DL (ref 0.57–2.63)
LYMPHOCYTES # BLD AUTO: 0.4 10E3/UL (ref 0.8–5.3)
LYMPHOCYTES NFR BLD AUTO: 5 %
M PROTEIN SERPL ELPH-MCNC: 0 G/DL
MCH RBC QN AUTO: 25.9 PG (ref 26.5–33)
MCHC RBC AUTO-ENTMCNC: 30.4 G/DL (ref 31.5–36.5)
MCV RBC AUTO: 85 FL (ref 78–100)
MONOCYTES # BLD AUTO: 0.2 10E3/UL (ref 0–1.3)
MONOCYTES NFR BLD AUTO: 3 %
NEUTROPHILS # BLD AUTO: 7.2 10E3/UL (ref 1.6–8.3)
NEUTROPHILS NFR BLD AUTO: 90 %
NRBC # BLD AUTO: 0 10E3/UL
NRBC BLD AUTO-RTO: 0 /100
PLATELET # BLD AUTO: 193 10E3/UL (ref 150–450)
POTASSIUM BLD-SCNC: 4.3 MMOL/L (ref 3.4–5.3)
POTASSIUM BLD-SCNC: 4.4 MMOL/L (ref 3.4–5.3)
PROT PATTERN SERPL ELPH-IMP: ABNORMAL
RBC # BLD AUTO: 4.13 10E6/UL (ref 3.8–5.2)
SODIUM SERPL-SCNC: 138 MMOL/L (ref 133–144)
SODIUM SERPL-SCNC: 140 MMOL/L (ref 133–144)
TOTAL PROTEIN SERUM FOR ELP: 6 G/DL (ref 6.8–8.8)
WBC # BLD AUTO: 7.9 10E3/UL (ref 4–11)

## 2021-01-01 PROCEDURE — 93279 PRGRMG DEV EVAL PM/LDLS PM: CPT | Performed by: INTERNAL MEDICINE

## 2021-01-01 PROCEDURE — 84165 PROTEIN E-PHORESIS SERUM: CPT | Mod: 26 | Performed by: PATHOLOGY

## 2021-01-01 PROCEDURE — 84165 PROTEIN E-PHORESIS SERUM: CPT | Mod: TC | Performed by: PATHOLOGY

## 2021-01-01 PROCEDURE — 99207 PR MD RECERTIFICATION HHA PT: CPT | Performed by: INTERNAL MEDICINE

## 2021-01-01 PROCEDURE — 99215 OFFICE O/P EST HI 40 MIN: CPT | Mod: 25 | Performed by: NURSE PRACTITIONER

## 2021-01-01 PROCEDURE — 250N000011 HC RX IP 250 OP 636: Performed by: INTERNAL MEDICINE

## 2021-01-01 PROCEDURE — 85041 AUTOMATED RBC COUNT: CPT | Performed by: INTERNAL MEDICINE

## 2021-01-01 PROCEDURE — 84155 ASSAY OF PROTEIN SERUM: CPT | Performed by: INTERNAL MEDICINE

## 2021-01-01 PROCEDURE — 80048 BASIC METABOLIC PNL TOTAL CA: CPT | Performed by: INTERNAL MEDICINE

## 2021-01-01 PROCEDURE — 83883 ASSAY NEPHELOMETRY NOT SPEC: CPT | Performed by: INTERNAL MEDICINE

## 2021-01-01 PROCEDURE — G0008 ADMIN INFLUENZA VIRUS VAC: HCPCS | Performed by: INTERNAL MEDICINE

## 2021-01-01 PROCEDURE — 99442 PR PHYSICIAN TELEPHONE EVALUATION 11-20 MIN: CPT | Mod: 95 | Performed by: INTERNAL MEDICINE

## 2021-01-01 PROCEDURE — 80048 BASIC METABOLIC PNL TOTAL CA: CPT

## 2021-01-01 PROCEDURE — 36591 DRAW BLOOD OFF VENOUS DEVICE: CPT

## 2021-01-01 PROCEDURE — 36415 COLL VENOUS BLD VENIPUNCTURE: CPT

## 2021-01-01 PROCEDURE — 90662 IIV NO PRSV INCREASED AG IM: CPT | Performed by: INTERNAL MEDICINE

## 2021-01-01 RX ORDER — HEPARIN SODIUM (PORCINE) LOCK FLUSH IV SOLN 100 UNIT/ML 100 UNIT/ML
500 SOLUTION INTRAVENOUS EVERY 8 HOURS
Status: DISCONTINUED | OUTPATIENT
Start: 2021-01-01 | End: 2021-01-01 | Stop reason: HOSPADM

## 2021-01-01 RX ORDER — HEPARIN SODIUM (PORCINE) LOCK FLUSH IV SOLN 100 UNIT/ML 100 UNIT/ML
500 SOLUTION INTRAVENOUS EVERY 8 HOURS
Status: CANCELLED
Start: 2021-01-01

## 2021-01-01 RX ADMIN — Medication 500 UNITS: at 10:42

## 2021-01-01 RX ADMIN — INFLUENZA A VIRUS A/VICTORIA/2570/2019 IVR-215 (H1N1) ANTIGEN (FORMALDEHYDE INACTIVATED), INFLUENZA A VIRUS A/TASMANIA/503/2020 IVR-221 (H3N2) ANTIGEN (FORMALDEHYDE INACTIVATED), INFLUENZA B VIRUS B/PHUKET/3073/2013 ANTIGEN (FORMALDEHYDE INACTIVATED), AND INFLUENZA B VIRUS B/WASHINGTON/02/2019 ANTIGEN (FORMALDEHYDE INACTIVATED) 0.7 ML: 60; 60; 60; 60 INJECTION, SUSPENSION INTRAMUSCULAR at 10:51

## 2021-01-01 ASSESSMENT — MIFFLIN-ST. JEOR: SCORE: 1127.14

## 2021-01-04 ENCOUNTER — CARE COORDINATION (OUTPATIENT)
Dept: CARDIOLOGY | Facility: CLINIC | Age: 86
End: 2021-01-04

## 2021-01-04 DIAGNOSIS — I50.32 CHRONIC DIASTOLIC HEART FAILURE (H): Primary | ICD-10-CM

## 2021-01-04 NOTE — PROGRESS NOTES
[facility]    Received VM from Ms. Arreola's daughter Yesi reporting weight gain and missed doses of diuretic. She wondered if Ms. Arreola needed a visit this week.    Ms. Arreola last had CORE visit 8/2020 and she is due for a visit with Elisabeth Hicks CNP.    I returned Yesi's call, no answer, VM full. Will reattempt contact later this afternoon.    Mary Galaviz RN BSN   2:07 PM 01/04/21

## 2021-01-04 NOTE — PROGRESS NOTES
"[facility]    Amira's daughter Yesi called to report patient's weight and symptoms.   --Last clinic visit 8/2020 wts ranged 139-143# and no changes made    Recent weights (lbs):  12/9 143  12/15 146  12/22 150  12/28 149  1/1 152  1/2 155    Breathing: Per Yesi, she's \"getting around but I can tell she's breathing harder.\" Not in distress at rest.  --PO intake typical.    Yesi told me that Amira has felt tired of frequent urination and incontinence and because of this has declined her daily dose of lasix at least once weekly.    Plan: Weight gain over last several weeks in setting of missed diuretics.  Reviewed with Yesi importance of negotiating [place] quality of life, med side effects and fluid status.  1. Asked Yesi to attempt to have patient take her prescribed diuretic daily  2. Recommend in clinic follow-up with Elisabeth Hicks CNP--limited availability. Will route to her for review.     Mary Galaviz RN BSN   4:31 PM 01/04/21        "

## 2021-01-05 NOTE — PROGRESS NOTES
"[facility]    Per Elisabeth Hicks CNP,   \"BINU Hernandez,     Yes, I am happy to see her in one of the infusion spots.     Thanks,   Elisabeth\"    Placed orders for CORE follow-up and labs. Messaged scheduling to call Amira's daughter Yesi to arrange lab and in clinic CORE return with Elisabeth BRASHER on infusion clinic time 1/14.    Mary Galaviz RN BSN   9:00 AM 01/05/21        "

## 2021-01-06 ENCOUNTER — HOSPITAL ENCOUNTER (OUTPATIENT)
Facility: CLINIC | Age: 86
Setting detail: SPECIMEN
Discharge: HOME | End: 2021-01-06
Attending: INTERNAL MEDICINE | Admitting: INTERNAL MEDICINE
Payer: MEDICARE

## 2021-01-06 ENCOUNTER — OFFICE VISIT (OUTPATIENT)
Dept: INFUSION THERAPY | Facility: CLINIC | Age: 86
End: 2021-01-06
Attending: INTERNAL MEDICINE
Payer: MEDICARE

## 2021-01-06 DIAGNOSIS — Z95.828 PORT-A-CATH IN PLACE: ICD-10-CM

## 2021-01-06 DIAGNOSIS — C90.00 MULTIPLE MYELOMA NOT HAVING ACHIEVED REMISSION (H): Primary | ICD-10-CM

## 2021-01-06 LAB
ALBUMIN SERPL-MCNC: 3.3 G/DL (ref 3.4–5)
ALP SERPL-CCNC: 43 U/L (ref 40–150)
ALT SERPL W P-5'-P-CCNC: 19 U/L (ref 0–50)
ANION GAP SERPL CALCULATED.3IONS-SCNC: 5 MMOL/L (ref 3–14)
AST SERPL W P-5'-P-CCNC: 20 U/L (ref 0–45)
BASOPHILS # BLD AUTO: 0 10E9/L (ref 0–0.2)
BASOPHILS NFR BLD AUTO: 0.1 %
BILIRUB SERPL-MCNC: 0.9 MG/DL (ref 0.2–1.3)
BUN SERPL-MCNC: 16 MG/DL (ref 7–30)
CALCIUM SERPL-MCNC: 9.9 MG/DL (ref 8.5–10.1)
CHLORIDE SERPL-SCNC: 108 MMOL/L (ref 94–109)
CO2 SERPL-SCNC: 26 MMOL/L (ref 20–32)
CREAT SERPL-MCNC: 0.83 MG/DL (ref 0.52–1.04)
DIFFERENTIAL METHOD BLD: ABNORMAL
EOSINOPHIL # BLD AUTO: 0.1 10E9/L (ref 0–0.7)
EOSINOPHIL NFR BLD AUTO: 1 %
ERYTHROCYTE [DISTWIDTH] IN BLOOD BY AUTOMATED COUNT: 16.2 % (ref 10–15)
GFR SERPL CREATININE-BSD FRML MDRD: 63 ML/MIN/{1.73_M2}
GLUCOSE SERPL-MCNC: 104 MG/DL (ref 70–99)
HCT VFR BLD AUTO: 34.3 % (ref 35–47)
HGB BLD-MCNC: 10.4 G/DL (ref 11.7–15.7)
IMM GRANULOCYTES # BLD: 0 10E9/L (ref 0–0.4)
IMM GRANULOCYTES NFR BLD: 0.5 %
LYMPHOCYTES # BLD AUTO: 0.7 10E9/L (ref 0.8–5.3)
LYMPHOCYTES NFR BLD AUTO: 8.5 %
MCH RBC QN AUTO: 25.4 PG (ref 26.5–33)
MCHC RBC AUTO-ENTMCNC: 30.3 G/DL (ref 31.5–36.5)
MCV RBC AUTO: 84 FL (ref 78–100)
MDC_IDC_EPISODE_DTM: NORMAL
MDC_IDC_EPISODE_ID: NORMAL
MDC_IDC_EPISODE_TYPE: NORMAL
MDC_IDC_LEAD_IMPLANT_DT: NORMAL
MDC_IDC_LEAD_LOCATION: NORMAL
MDC_IDC_LEAD_LOCATION_DETAIL_1: NORMAL
MDC_IDC_LEAD_MFG: NORMAL
MDC_IDC_LEAD_MODEL: NORMAL
MDC_IDC_LEAD_POLARITY_TYPE: NORMAL
MDC_IDC_LEAD_SERIAL: NORMAL
MDC_IDC_MSMT_BATTERY_DTM: NORMAL
MDC_IDC_MSMT_BATTERY_REMAINING_LONGEVITY: 108 MO
MDC_IDC_MSMT_BATTERY_REMAINING_PERCENTAGE: 100 %
MDC_IDC_MSMT_BATTERY_STATUS: NORMAL
MDC_IDC_MSMT_LEADCHNL_RV_IMPEDANCE_VALUE: 767 OHM
MDC_IDC_MSMT_LEADCHNL_RV_PACING_THRESHOLD_AMPLITUDE: 0.8 V
MDC_IDC_MSMT_LEADCHNL_RV_PACING_THRESHOLD_PULSEWIDTH: 0.4 MS
MDC_IDC_PG_IMPLANT_DTM: NORMAL
MDC_IDC_PG_MFG: NORMAL
MDC_IDC_PG_MODEL: NORMAL
MDC_IDC_PG_SERIAL: NORMAL
MDC_IDC_PG_TYPE: NORMAL
MDC_IDC_SESS_CLINIC_NAME: NORMAL
MDC_IDC_SESS_DTM: NORMAL
MDC_IDC_SESS_TYPE: NORMAL
MDC_IDC_SET_BRADY_LOWRATE: 70 {BEATS}/MIN
MDC_IDC_SET_BRADY_MAX_SENSOR_RATE: 130 {BEATS}/MIN
MDC_IDC_SET_BRADY_MODE: NORMAL
MDC_IDC_SET_LEADCHNL_RV_PACING_AMPLITUDE: 1.2 V
MDC_IDC_SET_LEADCHNL_RV_PACING_CAPTURE_MODE: NORMAL
MDC_IDC_SET_LEADCHNL_RV_PACING_POLARITY: NORMAL
MDC_IDC_SET_LEADCHNL_RV_PACING_PULSEWIDTH: 0.4 MS
MDC_IDC_SET_LEADCHNL_RV_SENSING_ADAPTATION_MODE: NORMAL
MDC_IDC_SET_LEADCHNL_RV_SENSING_POLARITY: NORMAL
MDC_IDC_SET_LEADCHNL_RV_SENSING_SENSITIVITY: 2.5 MV
MDC_IDC_SET_ZONE_DETECTION_INTERVAL: 375 MS
MDC_IDC_SET_ZONE_TYPE: NORMAL
MDC_IDC_SET_ZONE_VENDOR_TYPE: NORMAL
MDC_IDC_STAT_BRADY_DTM_END: NORMAL
MDC_IDC_STAT_BRADY_DTM_START: NORMAL
MDC_IDC_STAT_BRADY_RV_PERCENT_PACED: 100 %
MDC_IDC_STAT_EPISODE_RECENT_COUNT: 0
MDC_IDC_STAT_EPISODE_RECENT_COUNT_DTM_END: NORMAL
MDC_IDC_STAT_EPISODE_RECENT_COUNT_DTM_START: NORMAL
MDC_IDC_STAT_EPISODE_TYPE: NORMAL
MDC_IDC_STAT_EPISODE_VENDOR_TYPE: NORMAL
MDC_IDC_STAT_EPISODE_VENDOR_TYPE: NORMAL
MONOCYTES # BLD AUTO: 0.7 10E9/L (ref 0–1.3)
MONOCYTES NFR BLD AUTO: 9.2 %
NEUTROPHILS # BLD AUTO: 6.4 10E9/L (ref 1.6–8.3)
NEUTROPHILS NFR BLD AUTO: 80.7 %
NRBC # BLD AUTO: 0 10*3/UL
NRBC BLD AUTO-RTO: 0 /100
PLATELET # BLD AUTO: 189 10E9/L (ref 150–450)
POTASSIUM SERPL-SCNC: 4.2 MMOL/L (ref 3.4–5.3)
PROT SERPL-MCNC: 6.1 G/DL (ref 6.8–8.8)
RBC # BLD AUTO: 4.09 10E12/L (ref 3.8–5.2)
SODIUM SERPL-SCNC: 139 MMOL/L (ref 133–144)
WBC # BLD AUTO: 8 10E9/L (ref 4–11)

## 2021-01-06 PROCEDURE — 85025 COMPLETE CBC W/AUTO DIFF WBC: CPT | Performed by: INTERNAL MEDICINE

## 2021-01-06 PROCEDURE — 82784 ASSAY IGA/IGD/IGG/IGM EACH: CPT | Performed by: INTERNAL MEDICINE

## 2021-01-06 PROCEDURE — 36591 DRAW BLOOD OFF VENOUS DEVICE: CPT

## 2021-01-06 PROCEDURE — 80053 COMPREHEN METABOLIC PANEL: CPT | Performed by: INTERNAL MEDICINE

## 2021-01-06 PROCEDURE — 250N000011 HC RX IP 250 OP 636: Performed by: INTERNAL MEDICINE

## 2021-01-06 PROCEDURE — 84165 PROTEIN E-PHORESIS SERUM: CPT | Mod: 26 | Performed by: PATHOLOGY

## 2021-01-06 PROCEDURE — 86334 IMMUNOFIX E-PHORESIS SERUM: CPT | Mod: 26 | Performed by: PATHOLOGY

## 2021-01-06 PROCEDURE — 999N001036 HC STATISTIC TOTAL PROTEIN: Performed by: INTERNAL MEDICINE

## 2021-01-06 PROCEDURE — 84165 PROTEIN E-PHORESIS SERUM: CPT | Mod: TC | Performed by: INTERNAL MEDICINE

## 2021-01-06 PROCEDURE — 83883 ASSAY NEPHELOMETRY NOT SPEC: CPT | Performed by: INTERNAL MEDICINE

## 2021-01-06 PROCEDURE — 86334 IMMUNOFIX E-PHORESIS SERUM: CPT | Mod: TC | Performed by: INTERNAL MEDICINE

## 2021-01-06 RX ORDER — HEPARIN SODIUM (PORCINE) LOCK FLUSH IV SOLN 100 UNIT/ML 100 UNIT/ML
500 SOLUTION INTRAVENOUS EVERY 8 HOURS
Status: DISCONTINUED | OUTPATIENT
Start: 2021-01-06 | End: 2021-01-06 | Stop reason: HOSPADM

## 2021-01-06 RX ADMIN — Medication 500 UNITS: at 13:08

## 2021-01-06 NOTE — LETTER
1/6/2021         RE: Amira Arreola  [address]  [place] [address]        Dear Colleague,    Thank you for referring your patient, Amira Arreola, to the [facility]. Please see a copy of my visit note below.    Nursing Note:  Amira Arreola presents today for port lab draw.    Patient seen by provider today: No   present during visit today: Not Applicable.    Note: N/A.    Intravenous Access:  Implanted Port.    Discharge Plan:   Patient was sent to home  for appointment. With Dr Roberts tomorrow    Steffany Rider, SHELLIE                Again, thank you for allowing me to participate in the care of your patient.        Sincerely,         [facility]

## 2021-01-06 NOTE — PROGRESS NOTES
Nursing Note:  Amira Arreola presents today for port lab draw.    Patient seen by provider today: No   present during visit today: Not Applicable.    Note: N/A.    Intravenous Access:  Implanted Port.    Discharge Plan:   Patient was sent to home  for appointment. With Dr Roberts tomorrow    Steffany Rider RN

## 2021-01-07 ENCOUNTER — VIRTUAL VISIT (OUTPATIENT)
Dept: ONCOLOGY | Facility: CLINIC | Age: 86
End: 2021-01-07
Attending: INTERNAL MEDICINE
Payer: MEDICARE

## 2021-01-07 DIAGNOSIS — C90.00 MULTIPLE MYELOMA NOT HAVING ACHIEVED REMISSION (H): Primary | ICD-10-CM

## 2021-01-07 LAB
ALBUMIN SERPL ELPH-MCNC: 3.6 G/DL (ref 3.7–5.1)
ALPHA1 GLOB SERPL ELPH-MCNC: 0.4 G/DL (ref 0.2–0.4)
ALPHA2 GLOB SERPL ELPH-MCNC: 0.8 G/DL (ref 0.5–0.9)
B-GLOBULIN SERPL ELPH-MCNC: 0.7 G/DL (ref 0.6–1)
GAMMA GLOB SERPL ELPH-MCNC: 0.4 G/DL (ref 0.7–1.6)
IGA SERPL-MCNC: 34 MG/DL (ref 84–499)
IGG SERPL-MCNC: 330 MG/DL (ref 610–1616)
IGM SERPL-MCNC: 29 MG/DL (ref 35–242)
KAPPA LC UR-MCNC: 9.43 MG/DL (ref 0.33–1.94)
KAPPA LC/LAMBDA SER: 26.19 {RATIO} (ref 0.26–1.65)
LAMBDA LC SERPL-MCNC: 0.36 MG/DL (ref 0.57–2.63)
M PROTEIN SERPL ELPH-MCNC: 0 G/DL
PROT PATTERN SERPL ELPH-IMP: ABNORMAL
PROT PATTERN SERPL IFE-IMP: ABNORMAL

## 2021-01-07 PROCEDURE — 999N001193 HC VIDEO/TELEPHONE VISIT; NO CHARGE

## 2021-01-07 PROCEDURE — 99441 PR PHYSICIAN TELEPHONE EVALUATION 5-10 MIN: CPT | Mod: 95 | Performed by: INTERNAL MEDICINE

## 2021-01-07 ASSESSMENT — PAIN SCALES - GENERAL: PAINLEVEL: NO PAIN (0)

## 2021-01-07 NOTE — LETTER
"    1/7/2021         RE: Amira Arreola  [address]  [facility] [address]        Dear Colleague,    Thank you for referring your patient, Amira Arreola, to the [facility] [place]. Please see a copy of my visit note below.    Amira Arreola is a 88 year old female who is being evaluated via a billable telephone visit.      The patient has been notified of following:     \"This telephone visit will be conducted via a call between you and your physician/provider. We have found that certain health care needs can be provided without the need for a physical exam.  This service lets us provide the care you need with a short phone conversation.  If a prescription is necessary we can send it directly to your pharmacy.  If lab work is needed we can place an order for that and you can then stop by our lab to have the test done at a later time.    Telephone visits are billed at different rates depending on your insurance coverage. During this emergency period, for some insurers they may be billed the same as an in-person visit.  Please reach out to your insurance provider with any questions.    If during the course of the call the physician/provider feels a telephone visit is not appropriate, you will not be charged for this service.\"    Patient has given verbal consent for Telephone visit?  Yes  What phone number would you like to be contacted at? [phone]  How would you like to obtain your AVS? 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     1. On 09/21/2015, WBC of 4.2, hemoglobin of 13.2 and platelets of 138.    -On 09/29/2015, SPEP does not reveal any M-spike.   -On 10/02/2015, JANET does not reveal any monoclonal protein.     -On 10/22/2015, urine immunofixation reveals monoclonal free kappa light " chain.    2. On 05/11/2016, kappa light chain of 50, lambda light chain of 0.32 and ratio of kappa to lambda of 156.2.  3. Bone marrow biopsy on 05/25/2016 reveals 40-50% kappa light chain restricted plasma cells.  Cytogenetics is normal. FISH panel reveals translocation 11;14.    4. MRI of bones on 06/21/2016 and 06/22/2016 reveals myeloma lesions.  5. On 08/24/2016, she was started on revlimid with dexamethasone 20 mg weekly. She did not have any significant response to treatment.   6. Velcade and dexamethasone started on 03/21/2017.    7. Daratumumab added to velcade and dexamethasone on 05/31/2017.   -Velcade given every 14 days starting 08/01/2018.  -Treatment on hold. Last Velcade was on 06/05/2019.  Last daratumumab was on 05/22/2019. Dexamethasone continued.  8. On 05/22/2019, kappa free light chain of 1.21.      SUBJECTIVE:  Mrs. Arreola is an 88-year-old female with multiple myeloma.  She is currently on dexamethasone 20 mg once a week.      The patient's overall condition has been stable.  She has fatigue, which would go along with her age.  No headache.  No dizziness.  She has not been falling down.  No chest pain.  No shortness of breath.  No nausea or vomiting.  Appetite has been fairly good.  No recent infection.  No urinary or bowel complaints.  No bleeding.      The patient has chronic back pain.  The patient says the pain is pretty good in the morning.  It gets worse towards the late afternoon.  She takes oxycodone and it helps.      All other review of systems is negative.      PHYSICAL EXAMINATION:   GENERAL:  She is alert, oriented x 3.   This is a telephone visit.      LABORATORY DATA:  Reviewed.      ASSESSMENT:   1.  An 88-year-old female with kappa free light chain multiple myeloma on weekly dexamethasone.   2.  Chronic back pain, controlled.   3.  Fatigue secondary to her age and myeloma.   4.  Normocytic anemia, stable.   5.  Low protein and albumin.      PLAN:   1.  The patient clinically  is doing well.  She does not have any new symptoms.     2.  Discussed with her regarding myeloma. Myeloma labs from today are pending.      Discussed regarding treatment.  She will continue on dexamethasone 20 mg once a week.  She is tolerating it well.  It also helps with her pain.  In the future if there is progression of myeloma, we can add other oral treatment.        3.  We are holding her Zometa.  It will be considered in the future if there is significant progression of disease.        4.  For back pain, she will continue on Tylenol, which typically helps with the pain.  She will take oxycodone as needed.      5.  She is mildly anemic.  We will continue to monitor it.  She is not symptomatic from it.      6.  I will see her in 3 months' time with labs.  Advised her to call us with any questions or concerns. Her son was on the phone.  He had a few questions.  I advised him to bring the patient back if the patient has worsening weakness, pain or any other concerns.     ADDENDUM: Normal calcium and creatinine. Hemoglobin above 10 at 10.4. SPEP and serum JANET is normal. [place] free light chain has increased to 9.43. Myeloma is slowly progressing. At this time, she will continue on weekly dexamethasone. No other treatment.        ITZ LOPEZ MD             D: 2021   T: 2021   MT: WALTER      Name:     ALBERTO PARSON   MRN:      [id number]        Account:      [id number]   :      1932           Visit Date:   2021      Document: [id number]      Telephone visit for 5 minutes.    This office note has been dictated.          Again, thank you for allowing me to participate in the care of your patient.        Sincerely,        Itz Lopez MD

## 2021-01-07 NOTE — PROGRESS NOTES
"Amira Arreola is a 88 year old female who is being evaluated via a billable telephone visit.      The patient has been notified of following:     \"This telephone visit will be conducted via a call between you and your physician/provider. We have found that certain health care needs can be provided without the need for a physical exam.  This service lets us provide the care you need with a short phone conversation.  If a prescription is necessary we can send it directly to your pharmacy.  If lab work is needed we can place an order for that and you can then stop by our lab to have the test done at a later time.    Telephone visits are billed at different rates depending on your insurance coverage. During this emergency period, for some insurers they may be billed the same as an in-person visit.  Please reach out to your insurance provider with any questions.    If during the course of the call the physician/provider feels a telephone visit is not appropriate, you will not be charged for this service.\"    Patient has given verbal consent for Telephone visit?  Yes  What phone number would you like to be contacted at? [phone]  How would you like to obtain your AVS? Mail a copy    Phone call duration:         Pinky Marques CMA  1/7/2021      9:00 AM      "

## 2021-01-07 NOTE — PATIENT INSTRUCTIONS
1. Continue weekly dexamethasone.  2. Continue acyclovir.  3. Follow up in 3 months with labs. Labs to be done few days before appointment.    Please call to schedule.

## 2021-01-07 NOTE — LETTER
"    1/7/2021         RE: Amira Arreola  [address]  [facility] [address]        Dear Colleague,    Thank you for referring your patient, Amira Arreola, to the [facility] [place]. Please see a copy of my visit note below.    Amira Arreola is a 88 year old female who is being evaluated via a billable telephone visit.      The patient has been notified of following:     \"This telephone visit will be conducted via a call between you and your physician/provider. We have found that certain health care needs can be provided without the need for a physical exam.  This service lets us provide the care you need with a short phone conversation.  If a prescription is necessary we can send it directly to your pharmacy.  If lab work is needed we can place an order for that and you can then stop by our lab to have the test done at a later time.    Telephone visits are billed at different rates depending on your insurance coverage. During this emergency period, for some insurers they may be billed the same as an in-person visit.  Please reach out to your insurance provider with any questions.    If during the course of the call the physician/provider feels a telephone visit is not appropriate, you will not be charged for this service.\"    Patient has given verbal consent for Telephone visit?  Yes  What phone number would you like to be contacted at? [phone]  How would you like to obtain your AVS? 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     1. On 09/21/2015, WBC of 4.2, hemoglobin of 13.2 and platelets of 138.    -On 09/29/2015, SPEP does not reveal any M-spike.   -On 10/02/2015, JANET does not reveal any monoclonal protein.     -On 10/22/2015, urine immunofixation reveals monoclonal free kappa light " chain.    2. On 05/11/2016, kappa light chain of 50, lambda light chain of 0.32 and ratio of kappa to lambda of 156.2.  3. Bone marrow biopsy on 05/25/2016 reveals 40-50% kappa light chain restricted plasma cells.  Cytogenetics is normal. FISH panel reveals translocation 11;14.    4. MRI of bones on 06/21/2016 and 06/22/2016 reveals myeloma lesions.  5. On 08/24/2016, she was started on revlimid with dexamethasone 20 mg weekly. She did not have any significant response to treatment.   6. Velcade and dexamethasone started on 03/21/2017.    7. Daratumumab added to velcade and dexamethasone on 05/31/2017.   -Velcade given every 14 days starting 08/01/2018.  -Treatment on hold. Last Velcade was on 06/05/2019.  Last daratumumab was on 05/22/2019. Dexamethasone continued.  8. On 05/22/2019, kappa free light chain of 1.21.      SUBJECTIVE:  Mrs. Arreola is an 88-year-old female with multiple myeloma.  She is currently on dexamethasone 20 mg once a week.      The patient's overall condition has been stable.  She has fatigue, which would go along with her age.  No headache.  No dizziness.  She has not been falling down.  No chest pain.  No shortness of breath.  No nausea or vomiting.  Appetite has been fairly good.  No recent infection.  No urinary or bowel complaints.  No bleeding.      The patient has chronic back pain.  The patient says the pain is pretty good in the morning.  It gets worse towards the late afternoon.  She takes oxycodone and it helps.      All other review of systems is negative.      PHYSICAL EXAMINATION:   GENERAL:  She is alert, oriented x 3.   This is a telephone visit.      LABORATORY DATA:  Reviewed.      ASSESSMENT:   1.  An 88-year-old female with kappa free light chain multiple myeloma on weekly dexamethasone.   2.  Chronic back pain, controlled.   3.  Fatigue secondary to her age and myeloma.   4.  Normocytic anemia, stable.   5.  Low protein and albumin.      PLAN:   1.  The patient clinically  is doing well.  She does not have any new symptoms.     2.  Discussed with her regarding myeloma. Myeloma labs from today are pending.      Discussed regarding treatment.  She will continue on dexamethasone 20 mg once a week.  She is tolerating it well.  It also helps with her pain.  In the future if there is progression of myeloma, we can add other oral treatment.        3.  We are holding her Zometa.  It will be considered in the future if there is significant progression of disease.        4.  For back pain, she will continue on Tylenol, which typically helps with the pain.  She will take oxycodone as needed.      5.  She is mildly anemic.  We will continue to monitor it.  She is not symptomatic from it.      6.  I will see her in 3 months' time with labs.  Advised her to call us with any questions or concerns. Her son was on the phone.  He had a few questions.  I advised him to bring the patient back if the patient has worsening weakness, pain or any other concerns.     ADDENDUM: Normal calcium and creatinine. Hemoglobin above 10 at 10.4. SPEP and serum JANET is normal. [place] free light chain has increased to 9.43. Myeloma is slowly progressing. At this time, she will continue on weekly dexamethasone. No other treatment.        ITZ LOPEZ MD             D: 2021   T: 2021   MT: WALTER      Name:     ALBERTO PARSON   MRN:      [id number]        Account:      [id number]   :      1932           Visit Date:   2021      Document: [id number]      Telephone visit for 5 minutes.    This office note has been dictated.          Again, thank you for allowing me to participate in the care of your patient.        Sincerely,        Itz Lopez MD

## 2021-01-07 NOTE — PROGRESS NOTES
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     1. On 09/21/2015, WBC of 4.2, hemoglobin of 13.2 and platelets of 138.    -On 09/29/2015, SPEP does not reveal any M-spike.   -On 10/02/2015, JANET does not reveal any monoclonal protein.     -On 10/22/2015, urine immunofixation reveals monoclonal free kappa light chain.    2. On 05/11/2016, kappa light chain of 50, lambda light chain of 0.32 and ratio of kappa to lambda of 156.2.  3. Bone marrow biopsy on 05/25/2016 reveals 40-50% kappa light chain restricted plasma cells.  Cytogenetics is normal. FISH panel reveals translocation 11;14.    4. MRI of bones on 06/21/2016 and 06/22/2016 reveals myeloma lesions.  5. On 08/24/2016, she was started on revlimid with dexamethasone 20 mg weekly. She did not have any significant response to treatment.   6. Velcade and dexamethasone started on 03/21/2017.    7. Daratumumab added to velcade and dexamethasone on 05/31/2017.   -Velcade given every 14 days starting 08/01/2018.  -Treatment on hold. Last Velcade was on 06/05/2019.  Last daratumumab was on 05/22/2019. Dexamethasone continued.  8. On 05/22/2019, kappa free light chain of 1.21.      SUBJECTIVE:  Mrs. Arreola is an 88-year-old female with multiple myeloma.  She is currently on dexamethasone 20 mg once a week.      The patient's overall condition has been stable.  She has fatigue, which would go along with her age.  No headache.  No dizziness.  She has not been falling down.  No chest pain.  No shortness of breath.  No nausea or vomiting.  Appetite has been fairly good.  No recent infection.  No urinary or bowel complaints.  No bleeding.      The patient has chronic back pain.  The patient says the pain is pretty good in the morning.  It gets worse towards the late afternoon.  She takes oxycodone and it helps.      All other review of systems is negative.      PHYSICAL EXAMINATION:    GENERAL:  She is alert, oriented x 3.   This is a telephone visit.      LABORATORY DATA:  Reviewed.      ASSESSMENT:   1.  An 88-year-old female with kappa free light chain multiple myeloma on weekly dexamethasone.   2.  Chronic back pain, controlled.   3.  Fatigue secondary to her age and myeloma.   4.  Normocytic anemia, stable.   5.  Low protein and albumin.      PLAN:   1.  The patient clinically is doing well.  She does not have any new symptoms.     2.  Discussed with her regarding myeloma. Myeloma labs from today are pending.      Discussed regarding treatment.  She will continue on dexamethasone 20 mg once a week.  She is tolerating it well.  It also helps with her pain.  In the future if there is progression of myeloma, we can add other oral treatment.        3.  We are holding her Zometa.  It will be considered in the future if there is significant progression of disease.        4.  For back pain, she will continue on Tylenol, which typically helps with the pain.  She will take oxycodone as needed.      5.  She is mildly anemic.  We will continue to monitor it.  She is not symptomatic from it.      6.  I will see her in 3 months' time with labs.  Advised her to call us with any questions or concerns. Her son was on the phone.  He had a few questions.  I advised him to bring the patient back if the patient has worsening weakness, pain or any other concerns.     ADDENDUM: Normal calcium and creatinine. Hemoglobin above 10 at 10.4. SPEP and serum JANET is normal. [place] free light chain has increased to 9.43. Myeloma is slowly progressing. At this time, she will continue on weekly dexamethasone. No other treatment.        ITZ LOPEZ MD             D: 2021   T: 2021   MT: WALTER      Name:     ALBERTO PARSON   MRN:      [id number]        Account:      [id number]   :      1932           Visit Date:   2021      Document: [id number]      Telephone visit for 5 minutes.

## 2021-01-14 ENCOUNTER — OFFICE VISIT (OUTPATIENT)
Dept: CARDIOLOGY | Facility: CLINIC | Age: 86
End: 2021-01-14
Payer: MEDICARE

## 2021-01-14 VITALS
BODY MASS INDEX: 24.87 KG/M2 | WEIGHT: 149.3 LBS | DIASTOLIC BLOOD PRESSURE: 82 MMHG | HEART RATE: 72 BPM | SYSTOLIC BLOOD PRESSURE: 154 MMHG | HEIGHT: 65 IN | OXYGEN SATURATION: 97 %

## 2021-01-14 DIAGNOSIS — I50.32 CHRONIC DIASTOLIC HEART FAILURE (H): Primary | ICD-10-CM

## 2021-01-14 DIAGNOSIS — I07.1 TRICUSPID VALVE INSUFFICIENCY, UNSPECIFIED ETIOLOGY: ICD-10-CM

## 2021-01-14 DIAGNOSIS — I48.91 ATRIAL FIBRILLATION, UNSPECIFIED TYPE (H): ICD-10-CM

## 2021-01-14 DIAGNOSIS — I50.32 CHRONIC DIASTOLIC HEART FAILURE (H): ICD-10-CM

## 2021-01-14 DIAGNOSIS — I27.20 PULMONARY HYPERTENSION (H): ICD-10-CM

## 2021-01-14 DIAGNOSIS — I34.0 MITRAL VALVE INSUFFICIENCY, UNSPECIFIED ETIOLOGY: ICD-10-CM

## 2021-01-14 LAB
ANION GAP SERPL CALCULATED.3IONS-SCNC: 8 MMOL/L (ref 3–14)
BUN SERPL-MCNC: 24 MG/DL (ref 7–30)
CALCIUM SERPL-MCNC: 9.9 MG/DL (ref 8.5–10.1)
CHLORIDE SERPL-SCNC: 108 MMOL/L (ref 94–109)
CO2 SERPL-SCNC: 24 MMOL/L (ref 20–32)
CREAT SERPL-MCNC: 0.85 MG/DL (ref 0.52–1.04)
ERYTHROCYTE [DISTWIDTH] IN BLOOD BY AUTOMATED COUNT: 16.3 % (ref 10–15)
GFR SERPL CREATININE-BSD FRML MDRD: 61 ML/MIN/{1.73_M2}
GLUCOSE SERPL-MCNC: 113 MG/DL (ref 70–99)
HCT VFR BLD AUTO: 33.6 % (ref 35–47)
HGB BLD-MCNC: 10.3 G/DL (ref 11.7–15.7)
MCH RBC QN AUTO: 25.7 PG (ref 26.5–33)
MCHC RBC AUTO-ENTMCNC: 30.7 G/DL (ref 31.5–36.5)
MCV RBC AUTO: 84 FL (ref 78–100)
NT-PROBNP SERPL-MCNC: 6380 PG/ML (ref 0–450)
PLATELET # BLD AUTO: 231 10E9/L (ref 150–450)
POTASSIUM SERPL-SCNC: 3.9 MMOL/L (ref 3.4–5.3)
RBC # BLD AUTO: 4.01 10E12/L (ref 3.8–5.2)
SODIUM SERPL-SCNC: 140 MMOL/L (ref 133–144)
TSH SERPL DL<=0.005 MIU/L-ACNC: 0.69 MU/L (ref 0.4–4)
WBC # BLD AUTO: 8.9 10E9/L (ref 4–11)

## 2021-01-14 PROCEDURE — 99214 OFFICE O/P EST MOD 30 MIN: CPT | Performed by: NURSE PRACTITIONER

## 2021-01-14 PROCEDURE — 36415 COLL VENOUS BLD VENIPUNCTURE: CPT | Performed by: NURSE PRACTITIONER

## 2021-01-14 PROCEDURE — 84443 ASSAY THYROID STIM HORMONE: CPT | Performed by: NURSE PRACTITIONER

## 2021-01-14 PROCEDURE — 80048 BASIC METABOLIC PNL TOTAL CA: CPT | Performed by: NURSE PRACTITIONER

## 2021-01-14 PROCEDURE — 85027 COMPLETE CBC AUTOMATED: CPT | Performed by: NURSE PRACTITIONER

## 2021-01-14 PROCEDURE — 83880 ASSAY OF NATRIURETIC PEPTIDE: CPT | Performed by: NURSE PRACTITIONER

## 2021-01-14 RX ORDER — DEXAMETHASONE 4 MG/1
4 TABLET ORAL
COMMUNITY
End: 2021-07-07

## 2021-01-14 ASSESSMENT — MIFFLIN-ST. JEOR: SCORE: 1108.1

## 2021-01-14 NOTE — LETTER
1/14/2021    Addy Frias MD  [address]  [facility] [address]    RE: Amira Arreola       Dear Colleague,    I had the pleasure of seeing Amira Arreola in the [facility].    Cardiology Clinic Progress Note  Amira Arreola MRN# [id number]   YOB: 1932 Age: 88 year old   Primary Cardiologist: Dr. Rivera  Reason for visit: CORE follow up            Assessment and Plan:   Amira Arreola is a very pleasant 88 year old female with a history of HFpEF, chronic atrial fibrillation, hypertension, mitral regurgitation, tricuspid regurgitation and hx of multiple myeloma (Follows with Dr. Roberts).     I saw Amira and her son today for CORE follow up. Her weight has increased to the upper 140s, it was as high as 155# after the holidays. She is volume up on exam, but unwilling to increase diuretics.  This is secondary to continued high sodium diet and non-compliance with diuretics. We extensively reviewed her heart failure, diuretics and lifestyle modifications. She continues to consistently note frustration with frequent urination associated with diuretics, she feels this affects her quality of life and is why she refuses her diuretics some days. We have come to a compromise of allowing her to be slightly volume up to allow her some flexibility with her diuretics and not increase the dosage. I have noted my worry about her increased risk for hospitalization and how carrying extra fluid can lead to heart failure decompensation, patient and her son verbalized understanding. She will continue to try taking furosemide 60mg daily as many days as she can. Son also notes he will try getting her to take an additional 20mg daily when she is willing. Today given family noting changes in her functional status and continues to be volume up due to diuretic noncompliance I introduced palliative care, patient and family interested in visit with palliative medicine.        1.   Chronic diastolic heart failure/HFpEF - Echocardiogram completed 3/23/19 LVEF 55-60%, no wall motion abnormalities, moderate mitral regurgitation, moderate tricuspid regurgitation, and severe pulmonary hypertension. Volume up. Monitoring weight daily at home, brings log to clinic today, weight has been ranging 155-148#, this has been a slow increase from the 130#s due to diuretic noncompliance and high sodium diet. As noted above patient is unwilling to increase diuretics and is going to try her best to take them daily. We reviewed my concerns today and my worry for decompensation of her HF.              - NYHA class III, stage C              - Dry weight : ~ 140#              - Diuretic regimen : continue furosemide to 60mg daily, additional 20mg for weight gain >2# overnight or increased edema.               - Continue potassium to 20meq BID and additional 20meq on days when she takes an extra furosemide.               - Aldosterone antagonist : none              - Blood pressure : controlled              - Reinforced HF education, reviewed low sodium diet, reviewed foods to avoid.      2. Chronic atrial fibrillation - s/p AV solomon ablation with PPM implantation 7/5/19, stable, significant improvement in symptoms since ablation and heart rate control achieved.               - [id number] score 5 (HTN, HF, age, female)              - Continue warfarin for thromboembolic prophylaxis.      3. Moderate mitral regurgitation, moderate tricuspid regurgitation               - Will consider repeat echocardiogram in the future if worsening symptoms, currently won't change any management clinically.  Last echo 3/2019.     4. Severe pulmonary hypertension - likely secondary to HFpEF and likely improved with diuresis.              - Will consider repeat echocardiogram in the future, currently won't .      5. Multiple myeloma with mets to bone - follows with Dr. Roberts    6. Advanced care planning - introduced  role of palliative care, reviewed heart failure and typical progression, reviewed my worries about the noted decline in functional status.    - Patient and family interested in visit with the palliative medicine team.                Changes today: none, reinforced the importance of taking furosemide 60mg daily and on any day she is willing take an additional 20mg daily. We have compromised allowing her to be slightly volume up for increased quality of life, given she gets really frustrated by the frequent toileting with the diuretic.     Follow up plan:     Palliative care referral    Follow up with Dr. Rivera in 3 months    CORE follow up with me in 6 months    Consider PT/OT in the future, currently due to the pandemic not interested in having additional people in their house.         History of Presenting Illness:    Amira Arreola is a very pleasant 88 year old female with a history of HFpEF, chronic atrial fibrillation, hypertension, mitral regurgitation, tricuspid regurgitation and hx of multiple myeloma (Follows with Dr. Roberts).      Amira had multiple hospitalizations last summer (2019) for atrial fibrillation and decompensated diastolic HF, ultimately undergoing an AV solomon ablation with PPM 7/5/19. Echocardiogram completed 3/23/19 LVEF 55-60%, no wall motion abnormalities, moderate mitral regurgitation, moderate tricuspid regurgitation, and severe pulmonary hypertension.      Patient has been following closely in CORE clinic, last had a virtual visit in August 2020 at which point she was doing well. She reported overall controlled symptoms, her family helps with medication management. No medication changes were made.       She saw Dr. Roberts the end of January for her slowly progressing multiple myeloma at which time it was recommended to continue dexamethasone 20mg and noted would not start any other treatment unless there was significant progression of disease or evidence of end organ damage.  "     Patient is here today for CORE follow up.     Patient reports feeling good. Her son is present during clinic visit. Monitoring weights daily a home. Weight was as high as 155# on 1/2/21, weigh today is 148#. This is likely secondary to continued high sodium diet and non-compliance with diuretic. Daughter had called a couple weeks ago noting concerns about increased weight, shortness of breath and edema. She noted patient was missing multiple doses of her diuretics. Has since tried to increase her compliance with furosemide. Continues to miss 1-2 days per week of 60mg furosemide. She refuses to take additional 20mg due to bathroom frequency. Reports \"a little swelling\" \"not too bad\". Denies shortness of breath at rest. Denies exertional dyspnea, but daughter did call with concerns related to increased dyspnea. Denies orthopnea or PND. Son notes some continued decline in functional status and ability to do ADLs, including getting out of chair. Has had therapy at home, son recalls last about \"one year ago\". They provide exercise recommendations which she doesn't do. Getting some exercise with walking. Not interested in therapy at this time due to COVID-19 and not wanting more outside providers in the house. Patient appears to down play her symptoms and have little insight into her chronic health conditions. From reports of her family her lifestyle is sedentary and function decline has been noted, this may explain why she doesn't report symptoms as she does very little activity. She denies chest pain or chest tightness. Denies dizziness, lightheadedness or other presyncopal symptoms. Denies tachycardia or palpitations.     We extensively reviewed heart failure, diuretics and lifestyle modifications. She continues to consistently note frustration with frequent urination associated with diuretics, she feels this affects her quality of life and is why she refuses her diuretics some days. We have come to a compromise " of allowing her to be slightly volume up to allow her some flexibility with her diuretics and not increase the dosage. I have noted my worry about her increased risk for hospitalization and how carrying extra fluid can lead to heart failure decompensation, patient and her son verbalized understanding. She will continue to try taking furosemide 60mg daily as many days as she can. Son also notes he will try getting her to take an additional 20mg daily when she is willing.     Labs today show stable kidney function (creatinine 0.85) and stable electrolytes. Hemoglobin 10.3. TSH 0.69. NTproBNP 6,380. Blood pressure 154/82 and HR 72 in clinic today.    Appetite good. She likes cheese (eating approximately 5-8 craft singles daily), enjoying hotdishes, hamburger helper. Eating some fresh fruits daily. For dessert ice cream drumstick, frozen yogurt of fruit bar. Drinking 2-3 diet cokes, 1 cup coffee, 1 cup milk, 1-2 glasses water. No set exercise routine. Denies tobacco or alcohol use. Has a home health care aide that is coming 3 x a week and helps with house hold chores, recently increased from 2 days a week to 3 days a week.         Recent Hospitalizations   7/3/19-7/7/19 presenting from the cardiology office with hypotension and altered mental status felt to be related to hypovolemia and hypercalcemia. Patient was in atrial fibrillation with RVR. Serial troponin negative. Unable to tolerate rate control medications due to hypotension s/p AV solomon ablation with PPM 7/5/19. Furosemide on hold during most of hospital stay due to concerns of hypovolemia, resumed at lower dosage 20mg at discharge. S/p thoracentesis 7/5 250cc removed, transudative. Palliative care met with patient/family during hospital stay, still wishing to receive restorative care.   6/28/19-7/1/19 related to atrial fibrillation with RVR, acute on chronic diastolic heart failure and bilateral pleural effusions. BNP 13K on admission, 2K at time of discharge.  Diltiazem increased to 180mg/day.  3/22/19-3/28/19 presented with dyspnea, leg swelling and intermittent palpitations. Patient was found to have atrial fibrillation with RVR and acute diastolic heart failure exacerbation. Weight 3/24/19 147# (doesn't appear admission weight was completed). Discharge weight 135#.        Social History    , 6 children. Retired nurse.   Social History     Socioeconomic History     Marital status:      Spouse name: Tom     Number of children: 6     Years of education: Not on file     Highest education level: Not on file   Occupational History     Occupation: rn anesthetist     Employer: RETIRED   Social Needs     Financial resource strain: Not on file     Food insecurity     Worry: Not on file     Inability: Not on file     Transportation needs     Medical: Not on file     Non-medical: Not on file   Tobacco Use     Smoking status: Never Smoker     Smokeless tobacco: Never Used   Substance and Sexual Activity     Alcohol use: No     Alcohol/week: 0.0 standard drinks     Drug use: No     Sexual activity: Never   Lifestyle     Physical activity     Days per week: Not on file     Minutes per session: Not on file     Stress: Not on file   Relationships     Social connections     Talks on phone: Not on file     Gets together: Not on file     Attends Sabianism service: Not on file     Active member of club or organization: Not on file     Attends meetings of clubs or organizations: Not on file     Relationship status: Not on file     Intimate partner violence     Fear of current or ex partner: Not on file     Emotionally abused: Not on file     Physically abused: Not on file     Forced sexual activity: Not on file   Other Topics Concern     Parent/sibling w/ CABG, MI or angioplasty before 65F 55M? Not Asked   Social History Narrative     Not on file            Review of Systems:   Skin:  Positive for bruising   Eyes:  Negative    ENT:  Negative    Respiratory:  Negative   "  Cardiovascular:  Negative;palpitations;chest pain;syncope or near-syncope;cyanosis Positive for;exercise intolerance;edema;fatigue  Gastroenterology: Negative    Genitourinary:  not assessed    Musculoskeletal:  Positive for back pain  Neurologic:  Negative    Psychiatric:  Positive for sleep disturbances  Heme/Lymph/Imm:  Positive for easy bruising  Endocrine:  Negative           Physical Exam:   Vitals: BP (!) 154/82   Pulse 72   Ht 1.651 m (5' 5\")   Wt 67.7 kg (149 lb 4.8 oz)   SpO2 97%   BMI 24.84 kg/m     Wt Readings from Last 4 Encounters:   01/14/21 67.7 kg (149 lb 4.8 oz)   08/31/20 63.5 kg (139 lb 14.4 oz)   04/06/20 62.6 kg (138 lb)   01/08/20 60.1 kg (132 lb 8 oz)     GEN: well nourished, in no acute distress.  HEENT:  Pupils equal, round. Sclerae nonicteric.   NECK: Supple, no masses appreciated. JVD elevated.   C/V:  Regular rate and rhythm, no murmur, rub or gallop.    RESP: Respirations are unlabored. Clear to auscultation bilaterally without wheezing, rales, or rhonchi.  GI: Abdomen soft, nontender.  EXTREM: +1-2 LE edema.  NEURO: Alert and oriented, cooperative.  SKIN: Warm and dry.        Data:   ECHO 3/23/19  The left ventricle is normal in size.  The visual ejection fraction is estimated at 55-60%.  No regional wall motion abnormalities noted.  There is moderate (2+) mitral regurgitation.  There is moderate (2+) tricuspid regurgitation.  Severe (>55mmHg) pulmonary hypertension is present.  The rhythm was rapid atrial fibrillation.    LIPID RESULTS:  Lab Results   Component Value Date    CHOL 160 10/12/2016    HDL 76 10/12/2016    LDL 71 10/12/2016    TRIG 66 10/12/2016    CHOLHDLRATIO 2.3 09/21/2015     LIVER ENZYME RESULTS:  Lab Results   Component Value Date    AST 20 01/06/2021    ALT 19 01/06/2021     CBC RESULTS:  Lab Results   Component Value Date    WBC 8.9 01/14/2021    RBC 4.01 01/14/2021    HGB 10.3 (L) 01/14/2021    HCT 33.6 (L) 01/14/2021    MCV 84 01/14/2021    MCH 25.7 (L) " 01/14/2021    MCHC 30.7 (L) 01/14/2021    RDW 16.3 (H) 01/14/2021     01/14/2021     BMP RESULTS:  Lab Results   Component Value Date     01/14/2021    POTASSIUM 3.9 01/14/2021    CHLORIDE 108 01/14/2021    CO2 24 01/14/2021    ANIONGAP 8 01/14/2021     (H) 01/14/2021    BUN 24 01/14/2021    CR 0.85 01/14/2021    GFRESTIMATED 61 01/14/2021    GFRESTBLACK 71 01/14/2021    BRADLEY 9.9 01/14/2021      INR RESULTS:  Lab Results   Component Value Date    INR 2.5 (A) 12/31/2020    INR 3.5 (A) 12/18/2020            Medications     Current Outpatient Medications   Medication Sig Dispense Refill     acetaminophen (TYLENOL) 500 MG tablet Take 500 mg by mouth every 6 hours as needed for mild pain        acyclovir (ZOVIRAX) 400 MG tablet Take 1 tablet (400 mg) by mouth 2 times daily 60 tablet 4     Carboxymethylcellulose Sod PF (REFRESH PLUS) 0.5 % SOLN ophthalmic solution 1 drop 4 times daily as needed for dry eyes       dexamethasone (DECADRON) 4 MG tablet Take 4 mg by mouth 20 mg one day week on Wednesday       furosemide (LASIX) 20 MG tablet Take 3 (60mg) tablets daily, if weight > 137 pounds take an additional 20mg (1 tablet) 270 tablet 0     latanoprost (XALATAN) 0.005 % ophthalmic solution Place 1 drop into the right eye At Bedtime       lidocaine-prilocaine (EMLA) cream Apply to port site 1 hour prior to access 30 g 1     Multiple Vitamins-Minerals (DAILY MULTIVITAMIN) CAPS Take 1 tablet by mouth daily        nystatin (MYCOSTATIN) 100386 UNIT/GM external cream Apply topically 2 times daily 30 g 0     order for DME Equipment being ordered: Nebulizer with tubes/mask/acessories, use as directed 1 Device 0     oxyCODONE (ROXICODONE) 5 MG tablet Take half a pill every 12 hours as needed for pain. 30 tablet 0     polyethylene glycol (MIRALAX/GLYCOLAX) powder Take 17 g by mouth daily as needed for constipation        potassium chloride ER (KLOR-CON M) 20 MEQ CR tablet Take 1 tablet 2 x a day, with an additional  1 tablet on days when you take extra fursoemide 180 tablet 0     SIMBRINZA 1-0.2 % ophthalmic suspension Place 1 drop into the right eye 2 times daily 1 drop AM and PM  2     Sodium Fluoride (SF 5000 PLUS) 1.1 % CREA Apply to affected area 3 times daily       triamcinolone (KENALOG) 0.1 % external cream Apply topically 2 times daily 15 g 1     warfarin (COUMADIN) 4 MG tablet As directed.            Past Medical History     Past Medical History:   Diagnosis Date     Abnormal CXR 2018    then ct done and not significant     Acute diastolic heart failure (H) 03/22/2019    nl ef, 2+mr and tr with severe pulm htn     Ascending aorta dilatation (H) 04/2016    on echo, mild, fu 7/18 4.0, slightly larger     Cancer, metastatic to bone (H)     due to myeloma     Colonic polyp 2008    adenomatous, fu 2013 tics only     Compression fracture 2016    multiple areas of spine     Dry eyes      Elevated MCV 2015    b12 and folic acid nl     HTN (hypertension) 2000    off meds for years     Lung nodule 08/2018    on ct, 4mm, ct done for fu abnl cxr     Menorrhagia 2002    hysteroscopy and d and c done     MGUS (monoclonal gammopathy of unknown significance) 2015    eval by Dr. Roberts     Moderate to severe pulmonary hypertension (H) 03/2019    on echo     Multiple myeloma (H) 2016    dx 5/16 at [place], bone lesions seen on mri 6/16     OAB (overactive bladder) 2013    Dr. Grullon     Osteoporosis     fu done 2010 and stable, went off meds then, fu done 2013; has had gyn fu and added evista 2013 by gyn     Palpitations 4/16    nl echo, mildly dilated asc aorta     Paroxysmal atrial fibrillation (H) 04/2016    had palp and ziopatch showed it, echo nl lv fxn, mild mr and tr, added coum and toprol, toprol dose raised 12/22/16; hosp 2019 for this 3x, then had av solomon ablation and ppm 7/19     Sciatica of left side 12/13    Dr. Helen Ricardo 2004     SVT (supraventricular tachycardia) (H) 4/16    on ziopatch     Thrombocytopenia  (H)      Past Surgical History:   Procedure Laterality Date     BONE MARROW BIOPSY, BONE SPECIMEN, NEEDLE/TROCAR N/A 2016    Procedure: BIOPSY BONE MARROW;  Surgeon: Bryan Patel MD;  Location:  GI     CATARACT IOL, RT/LT        SECTION  1965, 1966     COLONOSCOPY  2013    Procedure: COLONOSCOPY;  COLONOSCOPY;  Surgeon: Steffany Rockwell MD;  Location:  GI     EP ABLATION AV NODE N/A 2019    Procedure: EP Ablation AV Node;  Surgeon: Lee Menchaca MD;  Location:  HEART CARDIAC CATH LAB     EP PACEMAKER N/A 2019    Procedure: EP Pacemaker;  Surgeon: Lee Menchaca MD;  Location:  HEART CARDIAC CATH LAB     EXCISE EXOSTOSIS TIBIA / FIBULA  2014    Procedure: EXCISE EXOSTOSIS TIBIA / FIBULA;  Surgeon: Naila Pichardo MD;  Location:  SD     hysteroscopy and d and c      due to bleeding     left anle replacement       right ankle surgery       Family History   Problem Relation Age of Onset     Heart Disease Father      C.A.D. Mother      Cerebrovascular Disease Brother      Family History Negative Sister      Family History Negative Sister      Family History Negative Brother             Allergies   Blood transfusion related (informational only) and Penicillin [penicillins]        DAYSI Wolf CNP  [facility]  Pager: [phone]      Thank you for allowing me to participate in the care of your patient.    Sincerely,     DAYSI Wolf CNP     [facility]

## 2021-01-14 NOTE — PATIENT INSTRUCTIONS
1. No medication changes, try taking furosemide 60mg daily with additional 20mg on the days your willing to.  2. Continue to monitor weight  3. Palliative care referral to have a visit with Dr. Farris  4. Follow up with Elisabeth in [facility] in 3 months  5. Please call with any questions/concerns [phone]

## 2021-01-14 NOTE — PROGRESS NOTES
Cardiology Clinic Progress Note  Amira Arreola MRN# [id number]   YOB: 1932 Age: 88 year old   Primary Cardiologist: Dr. Rivera  Reason for visit: CORE follow up            Assessment and Plan:   Amira Arreola is a very pleasant 88 year old female with a history of HFpEF, chronic atrial fibrillation, hypertension, mitral regurgitation, tricuspid regurgitation and hx of multiple myeloma (Follows with Dr. Roberts).     I saw Amira and her son today for CORE follow up. Her weight has increased to the upper 140s, it was as high as 155# after the holidays. She is volume up on exam, but unwilling to increase diuretics.  This is secondary to continued high sodium diet and non-compliance with diuretics. We extensively reviewed her heart failure, diuretics and lifestyle modifications. She continues to consistently note frustration with frequent urination associated with diuretics, she feels this affects her quality of life and is why she refuses her diuretics some days. We have come to a compromise of allowing her to be slightly volume up to allow her some flexibility with her diuretics and not increase the dosage. I have noted my worry about her increased risk for hospitalization and how carrying extra fluid can lead to heart failure decompensation, patient and her son verbalized understanding. She will continue to try taking furosemide 60mg daily as many days as she can. Son also notes he will try getting her to take an additional 20mg daily when she is willing. Today given family noting changes in her functional status and continues to be volume up due to diuretic noncompliance I introduced palliative care, patient and family interested in visit with palliative medicine.        1.  Chronic diastolic heart failure/HFpEF - Echocardiogram completed 3/23/19 LVEF 55-60%, no wall motion abnormalities, moderate mitral regurgitation, moderate tricuspid regurgitation, and severe pulmonary hypertension. Volume up.  Monitoring weight daily at home, brings log to clinic today, weight has been ranging 155-148#, this has been a slow increase from the 130#s due to diuretic noncompliance and high sodium diet. As noted above patient is unwilling to increase diuretics and is going to try her best to take them daily. We reviewed my concerns today and my worry for decompensation of her HF.              - NYHA class III, stage C              - Dry weight : ~ 140#              - Diuretic regimen : continue furosemide to 60mg daily, additional 20mg for weight gain >2# overnight or increased edema.               - Continue potassium to 20meq BID and additional 20meq on days when she takes an extra furosemide.               - Aldosterone antagonist : none              - Blood pressure : controlled              - Reinforced HF education, reviewed low sodium diet, reviewed foods to avoid.      2. Chronic atrial fibrillation - s/p AV solomon ablation with PPM implantation 7/5/19, stable, significant improvement in symptoms since ablation and heart rate control achieved.               - [id number] score 5 (HTN, HF, age, female)              - Continue warfarin for thromboembolic prophylaxis.      3. Moderate mitral regurgitation, moderate tricuspid regurgitation               - Will consider repeat echocardiogram in the future if worsening symptoms, currently won't change any management clinically.  Last echo 3/2019.     4. Severe pulmonary hypertension - likely secondary to HFpEF and likely improved with diuresis.              - Will consider repeat echocardiogram in the future, currently won't .      5. Multiple myeloma with mets to bone - follows with Dr. Roberts    6. Advanced care planning - introduced role of palliative care, reviewed heart failure and typical progression, reviewed my worries about the noted decline in functional status.    - Patient and family interested in visit with the palliative medicine team.                      Changes today: none, reinforced the importance of taking furosemide 60mg daily and on any day she is willing take an additional 20mg daily. We have compromised allowing her to be slightly volume up for increased quality of life, given she gets really frustrated by the frequent toileting with the diuretic.     Follow up plan:     Palliative care referral    Follow up with Dr. Rivera in 3 months    CORE follow up with me in 6 months    Consider PT/OT in the future, currently due to the pandemic not interested in having additional people in their house.         History of Presenting Illness:    Amira Arreola is a very pleasant 88 year old female with a history of HFpEF, chronic atrial fibrillation, hypertension, mitral regurgitation, tricuspid regurgitation and hx of multiple myeloma (Follows with Dr. Roberts).      Amira had multiple hospitalizations last summer (2019) for atrial fibrillation and decompensated diastolic HF, ultimately undergoing an AV solomon ablation with PPM 7/5/19. Echocardiogram completed 3/23/19 LVEF 55-60%, no wall motion abnormalities, moderate mitral regurgitation, moderate tricuspid regurgitation, and severe pulmonary hypertension.      Patient has been following closely in CORE clinic, last had a virtual visit in August 2020 at which point she was doing well. She reported overall controlled symptoms, her family helps with medication management. No medication changes were made.       She saw Dr. Roberts the end of January for her slowly progressing multiple myeloma at which time it was recommended to continue dexamethasone 20mg and noted would not start any other treatment unless there was significant progression of disease or evidence of end organ damage.      Patient is here today for CORE follow up.     Patient reports feeling good. Her son is present during clinic visit. Monitoring weights daily a home. Weight was as high as 155# on 1/2/21, weigh today is 148#. This is  "likely secondary to continued high sodium diet and non-compliance with diuretic. Daughter had called a couple weeks ago noting concerns about increased weight, shortness of breath and edema. She noted patient was missing multiple doses of her diuretics. Has since tried to increase her compliance with furosemide. Continues to miss 1-2 days per week of 60mg furosemide. She refuses to take additional 20mg due to bathroom frequency. Reports \"a little swelling\" \"not too bad\". Denies shortness of breath at rest. Denies exertional dyspnea, but daughter did call with concerns related to increased dyspnea. Denies orthopnea or PND. Son notes some continued decline in functional status and ability to do ADLs, including getting out of chair. Has had therapy at home, son recalls last about \"one year ago\". They provide exercise recommendations which she doesn't do. Getting some exercise with walking. Not interested in therapy at this time due to COVID-19 and not wanting more outside providers in the house. Patient appears to down play her symptoms and have little insight into her chronic health conditions. From reports of her family her lifestyle is sedentary and function decline has been noted, this may explain why she doesn't report symptoms as she does very little activity. She denies chest pain or chest tightness. Denies dizziness, lightheadedness or other presyncopal symptoms. Denies tachycardia or palpitations.     We extensively reviewed heart failure, diuretics and lifestyle modifications. She continues to consistently note frustration with frequent urination associated with diuretics, she feels this affects her quality of life and is why she refuses her diuretics some days. We have come to a compromise of allowing her to be slightly volume up to allow her some flexibility with her diuretics and not increase the dosage. I have noted my worry about her increased risk for hospitalization and how carrying extra fluid can " lead to heart failure decompensation, patient and her son verbalized understanding. She will continue to try taking furosemide 60mg daily as many days as she can. Son also notes he will try getting her to take an additional 20mg daily when she is willing.     Labs today show stable kidney function (creatinine 0.85) and stable electrolytes. Hemoglobin 10.3. TSH 0.69. NTproBNP 6,380. Blood pressure 154/82 and HR 72 in clinic today.    Appetite good. She likes cheese (eating approximately 5-8 craft singles daily), enjoying hotdishes, hamburger helper. Eating some fresh fruits daily. For dessert ice cream drumstick, frozen yogurt of fruit bar. Drinking 2-3 diet cokes, 1 cup coffee, 1 cup milk, 1-2 glasses water. No set exercise routine. Denies tobacco or alcohol use. Has a home health care aide that is coming 3 x a week and helps with house hold chores, recently increased from 2 days a week to 3 days a week.         Recent Hospitalizations   7/3/19-7/7/19 presenting from the cardiology office with hypotension and altered mental status felt to be related to hypovolemia and hypercalcemia. Patient was in atrial fibrillation with RVR. Serial troponin negative. Unable to tolerate rate control medications due to hypotension s/p AV solomon ablation with PPM 7/5/19. Furosemide on hold during most of hospital stay due to concerns of hypovolemia, resumed at lower dosage 20mg at discharge. S/p thoracentesis 7/5 250cc removed, transudative. Palliative care met with patient/family during hospital stay, still wishing to receive restorative care.   6/28/19-7/1/19 related to atrial fibrillation with RVR, acute on chronic diastolic heart failure and bilateral pleural effusions. BNP 13K on admission, 2K at time of discharge. Diltiazem increased to 180mg/day.  3/22/19-3/28/19 presented with dyspnea, leg swelling and intermittent palpitations. Patient was found to have atrial fibrillation with RVR and acute diastolic heart failure  exacerbation. Weight 3/24/19 147# (doesn't appear admission weight was completed). Discharge weight 135#.        Social History    , 6 children. Retired nurse.   Social History     Socioeconomic History     Marital status:      Spouse name: Tom     Number of children: 6     Years of education: Not on file     Highest education level: Not on file   Occupational History     Occupation: rn anesthetist     Employer: RETIRED   Social Needs     Financial resource strain: Not on file     Food insecurity     Worry: Not on file     Inability: Not on file     Transportation needs     Medical: Not on file     Non-medical: Not on file   Tobacco Use     Smoking status: Never Smoker     Smokeless tobacco: Never Used   Substance and Sexual Activity     Alcohol use: No     Alcohol/week: 0.0 standard drinks     Drug use: No     Sexual activity: Never   Lifestyle     Physical activity     Days per week: Not on file     Minutes per session: Not on file     Stress: Not on file   Relationships     Social connections     Talks on phone: Not on file     Gets together: Not on file     Attends [employer] service: Not on file     Active member of club or organization: Not on file     Attends meetings of clubs or organizations: Not on file     Relationship status: Not on file     Intimate partner violence     Fear of current or ex partner: Not on file     Emotionally abused: Not on file     Physically abused: Not on file     Forced sexual activity: Not on file   Other Topics Concern     Parent/sibling w/ CABG, MI or angioplasty before 65F 55M? Not Asked   Social History Narrative     Not on file            Review of Systems:   Skin:  Positive for bruising   Eyes:  Negative    ENT:  Negative    Respiratory:  Negative    Cardiovascular:  Negative;palpitations;chest pain;syncope or near-syncope;cyanosis Positive for;exercise intolerance;edema;fatigue  Gastroenterology: Negative    Genitourinary:  not assessed    Musculoskeletal:   "Positive for back pain  Neurologic:  Negative    Psychiatric:  Positive for sleep disturbances  Heme/Lymph/Imm:  Positive for easy bruising  Endocrine:  Negative           Physical Exam:   Vitals: BP (!) 154/82   Pulse 72   Ht 1.651 m (5' 5\")   Wt 67.7 kg (149 lb 4.8 oz)   SpO2 97%   BMI 24.84 kg/m     Wt Readings from Last 4 Encounters:   01/14/21 67.7 kg (149 lb 4.8 oz)   08/31/20 63.5 kg (139 lb 14.4 oz)   04/06/20 62.6 kg (138 lb)   01/08/20 60.1 kg (132 lb 8 oz)     GEN: well nourished, in no acute distress.  HEENT:  Pupils equal, round. Sclerae nonicteric.   NECK: Supple, no masses appreciated. JVD elevated.   C/V:  Regular rate and rhythm, no murmur, rub or gallop.    RESP: Respirations are unlabored. Clear to auscultation bilaterally without wheezing, rales, or rhonchi.  GI: Abdomen soft, nontender.  EXTREM: +1-2 LE edema.  NEURO: Alert and oriented, cooperative.  SKIN: Warm and dry.        Data:   ECHO 3/23/19  The left ventricle is normal in size.  The visual ejection fraction is estimated at 55-60%.  No regional wall motion abnormalities noted.  There is moderate (2+) mitral regurgitation.  There is moderate (2+) tricuspid regurgitation.  Severe (>55mmHg) pulmonary hypertension is present.  The rhythm was rapid atrial fibrillation.    LIPID RESULTS:  Lab Results   Component Value Date    CHOL 160 10/12/2016    HDL 76 10/12/2016    LDL 71 10/12/2016    TRIG 66 10/12/2016    CHOLHDLRATIO 2.3 09/21/2015     LIVER ENZYME RESULTS:  Lab Results   Component Value Date    AST 20 01/06/2021    ALT 19 01/06/2021     CBC RESULTS:  Lab Results   Component Value Date    WBC 8.9 01/14/2021    RBC 4.01 01/14/2021    HGB 10.3 (L) 01/14/2021    HCT 33.6 (L) 01/14/2021    MCV 84 01/14/2021    MCH 25.7 (L) 01/14/2021    MCHC 30.7 (L) 01/14/2021    RDW 16.3 (H) 01/14/2021     01/14/2021     BMP RESULTS:  Lab Results   Component Value Date     01/14/2021    POTASSIUM 3.9 01/14/2021    CHLORIDE 108 01/14/2021 "    CO2 24 01/14/2021    ANIONGAP 8 01/14/2021     (H) 01/14/2021    BUN 24 01/14/2021    CR 0.85 01/14/2021    GFRESTIMATED 61 01/14/2021    GFRESTBLACK 71 01/14/2021    BRADLEY 9.9 01/14/2021      INR RESULTS:  Lab Results   Component Value Date    INR 2.5 (A) 12/31/2020    INR 3.5 (A) 12/18/2020            Medications     Current Outpatient Medications   Medication Sig Dispense Refill     acetaminophen (TYLENOL) 500 MG tablet Take 500 mg by mouth every 6 hours as needed for mild pain        acyclovir (ZOVIRAX) 400 MG tablet Take 1 tablet (400 mg) by mouth 2 times daily 60 tablet 4     Carboxymethylcellulose Sod PF (REFRESH PLUS) 0.5 % SOLN ophthalmic solution 1 drop 4 times daily as needed for dry eyes       dexamethasone (DECADRON) 4 MG tablet Take 4 mg by mouth 20 mg one day week on Wednesday       furosemide (LASIX) 20 MG tablet Take 3 (60mg) tablets daily, if weight > 137 pounds take an additional 20mg (1 tablet) 270 tablet 0     latanoprost (XALATAN) 0.005 % ophthalmic solution Place 1 drop into the right eye At Bedtime       lidocaine-prilocaine (EMLA) cream Apply to port site 1 hour prior to access 30 g 1     Multiple Vitamins-Minerals (DAILY MULTIVITAMIN) CAPS Take 1 tablet by mouth daily        nystatin (MYCOSTATIN) 729937 UNIT/GM external cream Apply topically 2 times daily 30 g 0     order for DME Equipment being ordered: Nebulizer with tubes/mask/acessories, use as directed 1 Device 0     oxyCODONE (ROXICODONE) 5 MG tablet Take half a pill every 12 hours as needed for pain. 30 tablet 0     polyethylene glycol (MIRALAX/GLYCOLAX) powder Take 17 g by mouth daily as needed for constipation        potassium chloride ER (KLOR-CON M) 20 MEQ CR tablet Take 1 tablet 2 x a day, with an additional 1 tablet on days when you take extra fursoemide 180 tablet 0     SIMBRINZA 1-0.2 % ophthalmic suspension Place 1 drop into the right eye 2 times daily 1 drop AM and PM  2     Sodium Fluoride (SF 5000 PLUS) 1.1 % CREA  Apply to affected area 3 times daily       triamcinolone (KENALOG) 0.1 % external cream Apply topically 2 times daily 15 g 1     warfarin (COUMADIN) 4 MG tablet As directed.            Past Medical History     Past Medical History:   Diagnosis Date     Abnormal CXR 2018    then ct done and not significant     Acute diastolic heart failure (H) 03/22/2019    nl ef, 2+mr and tr with severe pulm htn     Ascending aorta dilatation (H) 04/2016    on echo, mild, fu 7/18 4.0, slightly larger     Cancer, metastatic to bone (H)     due to myeloma     Colonic polyp 2008    adenomatous, fu 2013 tics only     Compression fracture 2016    multiple areas of spine     Dry eyes      Elevated MCV 2015    b12 and folic acid nl     HTN (hypertension) 2000    off meds for years     Lung nodule 08/2018    on ct, 4mm, ct done for fu abnl cxr     Menorrhagia 2002    hysteroscopy and d and c done     MGUS (monoclonal gammopathy of unknown significance) 2015    eval by Dr. Roberts     Moderate to severe pulmonary hypertension (H) 03/2019    on echo     Multiple myeloma (H) 2016    dx 5/16 at [place], bone lesions seen on mri 6/16     OAB (overactive bladder) 2013    Dr. Grullon     Osteoporosis     fu done 2010 and stable, went off meds then, fu done 2013; has had gyn fu and added evista 2013 by gyn     Palpitations 4/16    nl echo, mildly dilated asc aorta     Paroxysmal atrial fibrillation (H) 04/2016    had palp and ziopatch showed it, echo nl lv fxn, mild mr and tr, added coum and toprol, toprol dose raised 12/22/16; hosp 2019 for this 3x, then had av solomon ablation and ppm 7/19     Sciatica of left side 12/13    Dr. Helen Ricardo 2004     SVT (supraventricular tachycardia) (H) 4/16    on ziopatch     Thrombocytopenia (H) 2014     Past Surgical History:   Procedure Laterality Date     BONE MARROW BIOPSY, BONE SPECIMEN, NEEDLE/TROCAR N/A 5/25/2016    Procedure: BIOPSY BONE MARROW;  Surgeon: Bryan Patel MD;  Location:   GI     CATARACT IOL, RT/LT        SECTION  1965, 1966     COLONOSCOPY  2013    Procedure: COLONOSCOPY;  COLONOSCOPY;  Surgeon: Steffany Rockwell MD;  Location:  GI     EP ABLATION AV NODE N/A 2019    Procedure: EP Ablation AV Node;  Surgeon: Lee Menchaca MD;  Location:  [facility]     EP PACEMAKER N/A 2019    Procedure: EP Pacemaker;  Surgeon: Lee Menchaca MD;  Location:  [facility]ATH [facility]     EXCISE EXOSTOSIS TIBIA / FIBULA  2014    Procedure: EXCISE EXOSTOSIS TIBIA / FIBULA;  Surgeon: Naila Pichardo MD;  Location:  SD     hysteroscopy and d and c      due to bleeding     left anle replacement       right ankle surgery       Family History   Problem Relation Age of Onset     Heart Disease Father      C.A.D. Mother      Cerebrovascular Disease Brother      Family History Negative Sister      Family History Negative Sister      Family History Negative Brother             Allergies   Blood transfusion related (informational only) and Penicillin [penicillins]        [facility]  Pager: [phone]

## 2021-01-15 ENCOUNTER — ANTICOAGULATION THERAPY VISIT (OUTPATIENT)
Dept: FAMILY MEDICINE | Facility: CLINIC | Age: 86
End: 2021-01-15

## 2021-01-15 DIAGNOSIS — I48.0 PAROXYSMAL ATRIAL FIBRILLATION (H): ICD-10-CM

## 2021-01-15 DIAGNOSIS — Z79.01 LONG TERM CURRENT USE OF ANTICOAGULANT THERAPY: ICD-10-CM

## 2021-01-15 LAB — INR PPP: 2.5 (ref 0.9–1.1)

## 2021-01-15 NOTE — PROGRESS NOTES
ANTICOAGULATION MANAGEMENT     Patient Name:  Amira Arreola  Date:  1/15/2021    ASSESSMENT /SUBJECTIVE:    Today's INR result of 2.5 is therapeutic. Goal INR of 2.0-3.0      Warfarin dose taken: Warfarin taken as instructed    Diet: No new diet changes affecting INR    Medication changes/ interactions: No new medications/supplements affecting INR    Previous INR: Therapeutic     S/S of bleeding or thromboembolism: No    New injury or illness: No    Upcoming surgery, procedure or cardioversion: No    Additional findings: None      PLAN:    Telephone call with home care nurse Jessica regarding INR result and instructed:     Warfarin Dosing Instructions: Continue your current warfarin dose 2 mg on mon/wed/fri and 4 mg all other days    Instructed patient to follow up no later than: 2 weeks  Orders given to  Homecare nurse/facility to recheck    Education provided: None required      Jessica, home care, verbalizes understanding and agrees to warfarin dosing plan.    Instructed to call the Anticoagulation Clinic for any changes, questions or concerns. (#[phone])        Suyapa Walton RN      OBJECTIVE:  Recent labs: (last 7 days)     01/15/21   INR 2.5*         No question data found.  Anticoagulation Summary  As of 1/15/2021    INR goal:  2.0-3.0   TTR:  64.8 % (1 y)   INR used for dosin.5 (1/15/2021)   Warfarin maintenance plan:  2 mg (4 mg x 0.5) every Mon, Wed, Fri; 4 mg (4 mg x 1) all other days   Full warfarin instructions:  2 mg every Mon, Wed, Fri; 4 mg all other days   Weekly warfarin total:  22 mg   Plan last modified:  Suyapa Walton RN (2020)   Next INR check:  2021   Priority:  Maintenance   Target end date:  Indefinite    Indications    Long term current use of anticoagulant therapy [Z79.01]  Atrial fibrillation (H) [I48.91] (Resolved) [I48.91]  Paroxysmal atrial fibrillation (H) [I48.0]             Anticoagulation Episode Summary     INR check location:      Preferred lab:  EXTERNAL  LAB    Send INR reminders to:  DANTE SUMNER    Comments:   Rajiv RN [facility] [phone] private pay nursing visits to check INR      Anticoagulation Care Providers     Provider Role Specialty Phone number    Addy Frias MD Referring Internal Medicine [phone]

## 2021-01-23 RX ORDER — HEPARIN SODIUM (PORCINE) LOCK FLUSH IV SOLN 100 UNIT/ML 100 UNIT/ML
500 SOLUTION INTRAVENOUS EVERY 8 HOURS
Status: CANCELLED
Start: 2021-01-23

## 2021-02-05 ENCOUNTER — ANTICOAGULATION THERAPY VISIT (OUTPATIENT)
Dept: FAMILY MEDICINE | Facility: CLINIC | Age: 86
End: 2021-02-05

## 2021-02-05 ENCOUNTER — DOCUMENTATION ONLY (OUTPATIENT)
Dept: CARE COORDINATION | Facility: CLINIC | Age: 86
End: 2021-02-05

## 2021-02-05 DIAGNOSIS — Z79.01 LONG TERM CURRENT USE OF ANTICOAGULANT THERAPY: ICD-10-CM

## 2021-02-05 DIAGNOSIS — I48.0 PAROXYSMAL ATRIAL FIBRILLATION (H): ICD-10-CM

## 2021-02-05 LAB — INR PPP: 1.9 (ref 0.9–1.1)

## 2021-02-05 NOTE — PROGRESS NOTES
ANTICOAGULATION MANAGEMENT     Patient Name:  Amira Arreola  Date:  2021    ASSESSMENT /SUBJECTIVE:    Today's INR result of 1.9 is subtherapeutic. Goal INR of 2.0-3.0      Warfarin dose taken: Warfarin taken as instructed    Diet: No new diet changes affecting INR    Medication changes/ interactions: No new medications/supplements affecting INR    Previous INR: Therapeutic     S/S of bleeding or thromboembolism: No    New injury or illness: No    Upcoming surgery, procedure or cardioversion: No    Additional findings: None      PLAN:    Telephone call with Amira regarding INR result and instructed:     Warfarin Dosing Instructions: Continue your current warfarin dose 2 mg on mon/wed/fri and 4 mg all other days    Instructed patient to follow up no later than: 2 weeks  Orders given to  Homecare nurse/facility to recheck    Education provided: None required      Rajiv, home care, verbalizes understanding and agrees to warfarin dosing plan.    Instructed to call the Anticoagulation Clinic for any changes, questions or concerns. (#[phone])        Suyapa Walton RN      OBJECTIVE:  Recent labs: (last 7 days)     21   INR 1.9*         No question data found.  Anticoagulation Summary  As of 2021    INR goal:  2.0-3.0   TTR:  63.8 % (1 y)   INR used for dosin.9 (2021)   Warfarin maintenance plan:  2 mg (4 mg x 0.5) every Mon, Wed, Fri; 4 mg (4 mg x 1) all other days   Full warfarin instructions:  2 mg every Mon, Wed, Fri; 4 mg all other days   Weekly warfarin total:  22 mg   No change documented:  Suyapa Walton RN   Plan last modified:  Suyapa Walton RN (2020)   Next INR check:  2021   Priority:  Maintenance   Target end date:  Indefinite    Indications    Long term current use of anticoagulant therapy [Z79.01]  Atrial fibrillation (H) [I48.91] (Resolved) [I48.91]  Paroxysmal atrial fibrillation (H) [I48.0]             Anticoagulation Episode Summary     INR check location:       Preferred lab:  EXTERNAL LAB    Send INR reminders to:  DANTE SUMNER    Comments:   Rajiv RN [facility] [phone] private pay nursing visits to check INR      Anticoagulation Care Providers     Provider Role Specialty Phone number    Addy Frias MD Referring Internal Medicine [phone]

## 2021-02-05 NOTE — PROGRESS NOTES
[facility] now requests orders and shares plan of care/discharge summaries for some patients through [employer].  Please REPLY TO THIS MESSAGE OR ROUTE BACK TO THE AUTHOR in order to give authorization for orders when needed.  This is considered a verbal order, you will still receive a faxed copy of orders for signature.  Thank you for your assistance in improving collaboration for our patients.    ORDER  HN visits as needed for ongoing INR management.      SUMMARY TO MD  Situation: 60 reassessment completed for continued homecare services, she is currently open for nurse visits primarily for ongoing INR management.  She is alert and oriented and is forgetfull at times.  She has chronic BLE edema, plus 1 today, wears compression stockings for ongoing managment.  Is up with use of walker, gait is slow, unsteady at times, able to walk about 25 feet before needing to take a rest.  Her port is maintained in clinic.  Her weights have been 145-150 per home log and is checked daily in morning.  Has a general understanding of her medications.  Her daughter manages and sets up medications for her.  Her son that resides with her and is primary caretaker will administer meds to her.  INR today 1.9, overall has been within range.  She resides in single family home with spouse and son.  Her daughter resides next door.  Also has private hired home health aid 3x/week to assist with personal cares, bathing, and light housekeeping. /72, R 16, HR 72, SPO2 98% on RA, T 98.3, wieght 148lbs, 0/10 pain currently but reports having increased pain of 3/10 with activity.   Background: PMI includes paroxymal AFib, chronic diastolic CHF, multiple myeloma with mets to bone, chronic low back pain, overactive bladder, Pulmonary HTN, PORT, osteoporosis.  Assessment: Risk for emergent care due to dependence on caregiver, bleeding risk r/t anticoagulation use, falls risk  Recommendation: Nurse visits as needed, billed private pay to  long term care insurance beneift for ongoing INR management.

## 2021-02-06 ENCOUNTER — HEALTH MAINTENANCE LETTER (OUTPATIENT)
Age: 86
End: 2021-02-06

## 2021-02-18 ENCOUNTER — ANTICOAGULATION THERAPY VISIT (OUTPATIENT)
Dept: FAMILY MEDICINE | Facility: CLINIC | Age: 86
End: 2021-02-18

## 2021-02-18 DIAGNOSIS — I48.0 PAROXYSMAL ATRIAL FIBRILLATION (H): ICD-10-CM

## 2021-02-18 DIAGNOSIS — Z79.01 LONG TERM CURRENT USE OF ANTICOAGULANT THERAPY: ICD-10-CM

## 2021-02-18 LAB — INR PPP: 2.6 (ref 0.9–1.1)

## 2021-02-18 NOTE — PROGRESS NOTES
ANTICOAGULATION MANAGEMENT     Patient Name:  Amira Arreola  Date:  2021    ASSESSMENT /SUBJECTIVE:    Today's INR result of 2.6 is therapeutic. Goal INR of 2.0-3.0      Warfarin dose taken: Warfarin taken as instructed    Diet: No new diet changes affecting INR    Medication changes/ interactions: No new medications/supplements affecting INR    Previous INR: Subtherapeutic     S/S of bleeding or thromboembolism: No    New injury or illness: No    Upcoming surgery, procedure or cardioversion: No    Additional findings: None      PLAN:    Telephone call with Jessica Nurse regarding INR result and instructed:     Warfarin Dosing Instructions: Continue your current warfarin dose 2 mg MWF; 4 mg all other days    Instructed patient to follow up no later than: 1 week  Orders given to  Homecare nurse/facility to recheck    Education provided: Monitoring for bleeding signs and symptoms and Monitoring for clotting signs and symptoms      Nurse Jessica verbalizes understanding and agrees to warfarin dosing plan.    Instructed to call the Anticoagulation Clinic for any changes, questions or concerns. (#[phone])        Tameka Perez RN      OBJECTIVE:  Recent labs: (last 7 days)     21   INR 2.6*         No question data found.  Anticoagulation Summary  As of 2021    INR goal:  2.0-3.0   TTR:  63.3 % (1 y)   INR used for dosin.6 (2021)   Warfarin maintenance plan:  2 mg (4 mg x 0.5) every Mon, Wed, Fri; 4 mg (4 mg x 1) all other days   Full warfarin instructions:  2 mg every Mon, Wed, Fri; 4 mg all other days   Weekly warfarin total:  22 mg   No change documented:  Tameka Perez RN   Plan last modified:  Suyapa Walton RN (2020)   Next INR check:  2021   Priority:  Maintenance   Target end date:  Indefinite    Indications    Long term current use of anticoagulant therapy [Z79.01]  Atrial fibrillation (H) [I48.91] (Resolved) [I48.91]  Paroxysmal atrial fibrillation (H) [I48.0]              Anticoagulation Episode Summary     INR check location:      Preferred lab:  EXTERNAL LAB    Send INR reminders to:  DANTE SUMNER    Comments:   Rajiv RN [facility] [phone] private pay nursing visits to check INR      Anticoagulation Care Providers     Provider Role Specialty Phone number    Addy Frias MD Referring Internal Medicine [phone]

## 2021-02-25 ENCOUNTER — ANTICOAGULATION THERAPY VISIT (OUTPATIENT)
Dept: FAMILY MEDICINE | Facility: CLINIC | Age: 86
End: 2021-02-25

## 2021-02-25 DIAGNOSIS — I48.0 PAROXYSMAL ATRIAL FIBRILLATION (H): ICD-10-CM

## 2021-02-25 DIAGNOSIS — Z79.01 LONG TERM CURRENT USE OF ANTICOAGULANT THERAPY: ICD-10-CM

## 2021-02-25 LAB — INR PPP: 2.3 (ref 0.9–1.1)

## 2021-02-25 NOTE — PROGRESS NOTES
ANTICOAGULATION MANAGEMENT     Patient Name:  Amira Arreola  Date:  2021    ASSESSMENT /SUBJECTIVE:    Today's INR result of 2.3 is therapeutic. Goal INR of 2.0-3.0      Warfarin dose taken: Warfarin taken as instructed    Diet: No new diet changes affecting INR    Medication changes/ interactions: No new medications/supplements affecting INR    Previous INR: Therapeutic     S/S of bleeding or thromboembolism: No    New injury or illness: No    Upcoming surgery, procedure or cardioversion: No    Additional findings: None      PLAN:    Telephone call with home care nurse Heather regarding INR result and instructed:     Warfarin Dosing Instructions: Continue your current warfarin dose 2mg every Mon, Wed, fri; 4mg all other days    Instructed patient to follow up no later than: 2 weeks  Patient to recheck with home meter    Education provided: Target INR goal and significance of current INR result      Heather verbalizes understanding and agrees to warfarin dosing plan.    Instructed to call the Anticoagulation Clinic for any changes, questions or concerns. (#[phone])        Ernst Carlson RN      OBJECTIVE:  Recent labs: (last 7 days)     21   INR 2.3*         No question data found.  Anticoagulation Summary  As of 2021    INR goal:  2.0-3.0   TTR:  63.3 % (1 y)   INR used for dosin.3 (2021)   Warfarin maintenance plan:  2 mg (4 mg x 0.5) every Mon, Wed, Fri; 4 mg (4 mg x 1) all other days   Full warfarin instructions:  2 mg every Mon, Wed, Fri; 4 mg all other days   Weekly warfarin total:  22 mg   No change documented:  Ernst Carlson, RN   Plan last modified:  Suyapa Walton RN (2020)   Next INR check:  3/11/2021   Priority:  Maintenance   Target end date:  Indefinite    Indications    Long term current use of anticoagulant therapy [Z79.01]  Atrial fibrillation (H) [I48.91] (Resolved) [I48.91]  Paroxysmal atrial fibrillation (H) [I48.0]             Anticoagulation Episode Summary      INR check location:      Preferred lab:  EXTERNAL LAB    Send INR reminders to:  DANTE SUMNER    Comments:   Rajiv RN [facility] [phone] private pay nursing visits to check INR      Anticoagulation Care Providers     Provider Role Specialty Phone number    Addy Frias MD Referring Internal Medicine [phone]

## 2021-03-10 DIAGNOSIS — I50.32 CHRONIC DIASTOLIC HEART FAILURE (H): ICD-10-CM

## 2021-03-10 RX ORDER — FUROSEMIDE 20 MG
TABLET ORAL
Qty: 270 TABLET | Refills: 0 | Status: SHIPPED | OUTPATIENT
Start: 2021-03-10 | End: 2021-04-26

## 2021-03-11 ENCOUNTER — ANTICOAGULATION THERAPY VISIT (OUTPATIENT)
Dept: FAMILY MEDICINE | Facility: CLINIC | Age: 86
End: 2021-03-11

## 2021-03-11 DIAGNOSIS — Z79.01 LONG TERM CURRENT USE OF ANTICOAGULANT THERAPY: ICD-10-CM

## 2021-03-11 DIAGNOSIS — I48.0 PAROXYSMAL ATRIAL FIBRILLATION (H): ICD-10-CM

## 2021-03-11 LAB — INR PPP: 2.7 (ref 0.9–1.1)

## 2021-03-11 NOTE — PROGRESS NOTES
ANTICOAGULATION MANAGEMENT     Patient Name:  Amira Arreola  Date:  3/11/2021    ASSESSMENT /SUBJECTIVE:    Today's INR result of 2.7 is therapeutic. Goal INR of 2.0-3.0      Warfarin dose taken: Warfarin taken as instructed    Diet: Decreased greens/vitamin K in diet; plans to resume previous intake    Medication changes/ interactions: No new medications/supplements affecting INR    Previous INR: Therapeutic     S/S of bleeding or thromboembolism: No    New injury or illness: No    Upcoming surgery, procedure or cardioversion: No    Additional findings: None      PLAN:    Telephone call with home care nurse Jessica regarding INR result and instructed:     Warfarin Dosing Instructions: Continue your current warfarin dose 2 mg (4 mg x 0.5) every Mon, Wed, Fri; 4 mg (4 mg x 1) all other days    Instructed patient to follow up no later than: 2 weeks  Orders given to  Homecare nurse/facility to recheck    Education provided: Monitoring for bleeding signs and symptoms and Monitoring for clotting signs and symptoms      Jessica verbalizes understanding and agrees to warfarin dosing plan.    Instructed to call the Anticoagulation Clinic for any changes, questions or concerns. (#[phone])        Tameka Perez RN      OBJECTIVE:  Recent labs: (last 7 days)     21   INR 2.7*         No question data found.  Anticoagulation Summary  As of 3/11/2021    INR goal:  2.0-3.0   TTR:  63.3 % (1 y)   INR used for dosin.7 (3/11/2021)   Warfarin maintenance plan:  2 mg (4 mg x 0.5) every Mon, Wed, Fri; 4 mg (4 mg x 1) all other days   Full warfarin instructions:  2 mg every Mon, Wed, Fri; 4 mg all other days   Weekly warfarin total:  22 mg   No change documented:  Tameka Perez RN   Plan last modified:  Suyapa Walton RN (2020)   Next INR check:  3/25/2021   Priority:  Maintenance   Target end date:  Indefinite    Indications    Long term current use of anticoagulant therapy [Z79.01]  Atrial fibrillation (H)  [I48.91] (Resolved) [I48.91]  Paroxysmal atrial fibrillation (H) [I48.0]             Anticoagulation Episode Summary     INR check location:      Preferred lab:  EXTERNAL LAB    Send INR reminders to:  DANTE SUMNER    Comments:   Rajiv HENSLEY [facility] [phone] private pay nursing visits to check INR      Anticoagulation Care Providers     Provider Role Specialty Phone number    Addy Frias MD Referring Internal Medicine [phone]

## 2021-03-17 NOTE — PLAN OF CARE
AVS reviewed with patient and her son. Prescription sent to [facility] and son will  on their way home. AVS reviewed and no further questions at this time. Pt discharged w/ her son and all belongings sent w/ patient.    Cheilitis Normal Treatment: I recommended application of Vaseline or Aquaphor numerous times a day (as often as every hour) and before going to bed.

## 2021-03-25 ENCOUNTER — ANTICOAGULATION THERAPY VISIT (OUTPATIENT)
Dept: FAMILY MEDICINE | Facility: CLINIC | Age: 86
End: 2021-03-25

## 2021-03-25 DIAGNOSIS — I48.0 PAROXYSMAL ATRIAL FIBRILLATION (H): ICD-10-CM

## 2021-03-25 DIAGNOSIS — Z79.01 LONG TERM CURRENT USE OF ANTICOAGULANT THERAPY: ICD-10-CM

## 2021-03-25 LAB — INR PPP: 2.8 (ref 0.9–1.1)

## 2021-03-25 NOTE — PROGRESS NOTES
ANTICOAGULATION MANAGEMENT     Patient Name:  Amira rAreola  Date:  3/25/2021    ASSESSMENT /SUBJECTIVE:    Today's INR result of 2.8 is therapeutic. Goal INR of 2.0-3.0      Warfarin dose taken: Warfarin taken as instructed    Diet: No new diet changes affecting INR    Medication changes/ interactions: No new medications/supplements affecting INR    Previous INR: Therapeutic     S/S of bleeding or thromboembolism: No    New injury or illness: No    Upcoming surgery, procedure or cardioversion: No    Additional findings: None      PLAN:    Telephone call with home care nurse SHELLIE Greene regarding INR result and instructed:     Warfarin Dosing Instructions: Continue your current warfarin dose 2mg every Mon, Wed, & Fri; 4mg all other days of the week.     Instructed patient to follow up no later than: 3 weeks  Orders given to  Homecare nurse/facility to recheck    Education provided: Target INR goal and significance of current INR result and Importance of therapeutic range      SHELLIE Greene verbalizes understanding and agrees to warfarin dosing plan.    Instructed to call the Anticoagulation Clinic for any changes, questions or concerns. (#[phone])        Doc Sims RN      OBJECTIVE:  Recent labs: (last 7 days)     21   INR 2.8*         No question data found.  Anticoagulation Summary  As of 3/25/2021    INR goal:  2.0-3.0   TTR:  63.3 % (1 y)   INR used for dosin.8 (3/25/2021)   Warfarin maintenance plan:  2 mg (4 mg x 0.5) every Mon, Wed, Fri; 4 mg (4 mg x 1) all other days   Full warfarin instructions:  2 mg every Mon, Wed, Fri; 4 mg all other days   Weekly warfarin total:  22 mg   Plan last modified:  Suyapa Walton RN (2020)   Next INR check:     Priority:  Maintenance   Target end date:  Indefinite    Indications    Long term current use of anticoagulant therapy [Z79.01]  Atrial fibrillation (H) [I48.91] (Resolved) [I48.91]  Paroxysmal atrial fibrillation (H) [I48.0]              Anticoagulation Episode Summary     INR check location:      Preferred lab:  EXTERNAL LAB    Send INR reminders to:  DANTE SUMNER    Comments:   Rajiv RN [facility] [phone] private pay nursing visits to check INR      Anticoagulation Care Providers     Provider Role Specialty Phone number    Addy Frias MD Referring Internal Medicine [phone]

## 2021-03-26 ENCOUNTER — CARE COORDINATION (OUTPATIENT)
Dept: CARDIOLOGY | Facility: CLINIC | Age: 86
End: 2021-03-26

## 2021-03-26 NOTE — PROGRESS NOTES
Reviewed [facility] phone encounter. Agree with plan. Keep me posted next week if any changes.     Thanks,  Elisabeth

## 2021-03-26 NOTE — PROGRESS NOTES
"[facility]    Incoming call from patient/son w/concerns about sx. Patient stated she wok up last night feeling \"fuzzy.\"  She denied that SOB woke her, but the stated that she sat at the edge of the bed for a bit and felt better. She denied feeling any SOB as I talked to her. Her son added that over time he has noticed that her breathing w/activity is maybe a little shorter, but today he is not seeing any acute change. Patient also stated that her leg/abdominal swelling in unchanged. It was not entirely clear what patients concern was/why she called [facility] as she sounds to be at her baseline. Son added that she has \"not felt well the last 2 days,\" but was unable to specify what that meant.    Current home weights:   3/26 156   3/25 155   3/24 153   3/23 154   3/22    3/21 152  Patients estimated dry weight: ~140#    Current diuretic regimen(per Elisabeth Hicks on 1/14/21):   Continue furosemide to 60mg daily, additional 20mg for weight gain >2# overnight or increased edema. I confirmed that she took 60mg today.              Continue potassium to 20meq BID and additional 20meq on days when she takes an extra furosemide.   Patient/son confirmed that lasix not taken 2-3X/week. This is unchanged from when patient last assessed by Elisabeth on 1/14/21. She has not taken any \"extra\" lasix.     As patient remains above her dry weight and she continues to miss lasix doses, will have her take the extra 20mg lasix + extra 20meg KCL as per her prn order. Will update Elisabeth and have CORE RN f/u Monday to assess response. Patient/son agreed to plan and had no other questions.   Bernarda Lira, SHELLIE on 3/26/2021 at 11:21 AM    "

## 2021-03-29 ENCOUNTER — TELEPHONE (OUTPATIENT)
Dept: FAMILY MEDICINE | Facility: CLINIC | Age: 86
End: 2021-03-29

## 2021-03-29 DIAGNOSIS — R32 URINARY INCONTINENCE, UNSPECIFIED TYPE: Primary | ICD-10-CM

## 2021-03-29 NOTE — TELEPHONE ENCOUNTER
Faxed DME order to [facility] and also to patient's daughter, Desiree Arreola (per daughter's request).     Called and informed Desiree to expect fax.   Scheduled patient with PCP 4/13/21.     Bri GONCALVES, RN      March 29, 2021  3:38 PM

## 2021-03-29 NOTE — PROGRESS NOTES
[facility]    Called Ms. Arreola to follow-up on notes from 3/26/21. Her  answered and noted she was still sleeping. I told him I'd call back later today.    I left a  for Ms. Arreola's daughter Yesi, asking that she return my call if she has any updates regarding Ms. Arreola's status after extra diuretic 3/26.    Future Appointments   Date Time Provider Department Center   4/5/2021 10:30 AM  FAST TRACK LAB [facility]   4/7/2021 10:00 AM Shayne Roberts MD [facility]   4/19/2021 12:00 AM MA TECH1 [facility]   4/26/2021  9:45 AM MA LAB [facility]   4/26/2021 10:00 AM Ramos Rivera MD [facility]     Mary Galaviz RN BSN   10:29 AM 03/29/21

## 2021-03-29 NOTE — PROGRESS NOTES
"[facility]    Amira's daughter Yesi returned my call.     Yesi said the patient did take total of 80 mg lasix x1 day last week as directed. And she had a \"great\" response with weight loss of \"several\" pounds, though Yesi did not have weight readings at hand.    Yesi asked how to proceed with diuretic plan.    We discussed recommendations from Elisabeth Hicks CNP's most recent clinic note 1/2021 weighing need for diuresis along with concerns re: quality of life/frequent urination.     We reviewed that intended diuretic dose is 60 mg lasix daily (plus 20mg extra if weight gain >2lbs over night or new HF symptoms), noting that Amira may decline this dose intermittently, as in past, due to frequent urination. We discussed that goal could be for her to take 60 mg lasix as many days as she can tolerate per week, with one day where adult children are available to help when she could take 80 mg of lasix if her weight was >145lbs.     We reviewed symptoms of concern and I asked Yesi to call us if they need assistance with making decisions, new symptoms/concerns. She agreed to plan and expressed appreciation.    We discussed that referral to palliative care has been made and reviewed their scope. Yesi said she would discuss this with her parents.    Future Appointments   Date Time Provider Department Center   4/5/2021 10:30 AM  FAST TRACK LAB [facility]   4/7/2021 10:00 AM Shanye Roberts MD [facility]   4/19/2021 12:00 AM MA TECH1 [facility]   4/26/2021  9:45 AM MA LAB [facility] PSA CL[facility]   4/26/2021 10:00 AM Ramos Rivera MD [facility] PSA CLIN     Mary Galaviz RN BSN   12:47 PM 03/29/21        "

## 2021-03-29 NOTE — TELEPHONE ENCOUNTER
Reason for Call: Request for an order or referral:    Order or referral being requested: DME order for catheter    Date needed: as soon as possible    Has the patient been seen by the PCP for this problem? YES    Additional comments: Daughter, Dr. Ramírez Miltonon calling asking for a DME order for a female pure wick external catheter for nighttime to help with patient's incontinence.  They need an order saying she does have significant urinary incontinence and would benefit from the use of this.  Can be faxed to [facility] at fax number [phone] and also fax to daughter at fax number [phone].  Please call daughter at number below when done so she knows to watch for that.    Phone number Patient can be reached at:  Other phone number:  [phone]    Best Time:      Can we leave a detailed message on this number?  YES    Call taken on 3/29/2021 at 10:01 AM by Lee Ann Alamo

## 2021-03-29 NOTE — TELEPHONE ENCOUNTER
Is this new, has it been evaluated before.  I am concerned about using a catheter off and on with the significant risk of uti    Addy Frias M.D.

## 2021-03-29 NOTE — TELEPHONE ENCOUNTER
I did not realize what that was, I did the dme, if no appt then ok to use hosp follow up spot    Addy Frias M.D.

## 2021-03-29 NOTE — TELEPHONE ENCOUNTER
TO PCP:     Spoke with patient's daughter.     Has had incontinence for quite some time - wearing depends, changes multiple times per day     Is reluctant to take a diuretic due to increased wetness with her depends. Denies any UTI symptoms.     She said with this there is less of a concern for UTIs because it's not an internal catheter, almost like a external pad/gentle vacuum takes urine away, mostly for nighttime for when laying down. It's covered by Medicare so she is asking if PCP would recommend that?      Pt's daughter lives out of town, asking if she can bring pt in to see PCP sometime 4/12-4/16? She will be in town that week.  Is available any day/time that week     please advise    Arnua ROBERSON RN

## 2021-03-31 ENCOUNTER — TELEPHONE (OUTPATIENT)
Dept: FAMILY MEDICINE | Facility: CLINIC | Age: 86
End: 2021-03-31

## 2021-03-31 DIAGNOSIS — R32 URINARY INCONTINENCE IN FEMALE: Primary | ICD-10-CM

## 2021-03-31 NOTE — TELEPHONE ENCOUNTER
Triage called [phone] to request form needed for wick external catheter. Triage was transferred to answering service. Left  message asking medical supplier to call triage back. On call back, please request form needed for DME order. Form can be faxed at [phone]. Please send message back to CS triage to follow up on order.

## 2021-03-31 NOTE — TELEPHONE ENCOUNTER
Patients daughter Eufemia, called requesting more information in order for Medicare to cover DME order for wick external catheter. Daughter Asked triage to contact [facility] at [phone] option 2 and request form. Medicare form needs to be submitted/ fax along with DME order. Order also needs new medical dx. 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     Eufemia is requesting a call when this order is faxed. She also requested a copy of DME order sent to [email or web address].    Eufemia's phone number is [phone] or cell phone [phone].    Pt's daughter asked if this can be sent [facility] since she is traveling to see pt in [place] and hopes to have this available.

## 2021-03-31 NOTE — TELEPHONE ENCOUNTER
Second attempt. Unable to talk to [facility] medical staff. Left message with answering service asking medical supply staff call triage back.     DME was also pended and sent to provider for review. Please advice on dx change to urinary incontinent permanent. If providers agrees with change, DME order needs to be updated.

## 2021-04-01 NOTE — TELEPHONE ENCOUNTER
Triage called [facility]. Left another message asking staff to fax necessary forms for catheter order at [phone].     Triage also called daughter Eufemia. Left voice message informing Eufemia multiple calls have been made to [facility]. We have not heard from that office or received any forms Also Dx provided for DME order is not an option.      Left voice message for Eufemia to call triage back. On call back, please let her know new DME order was placed with diagnosis- Urinary incontinence in female [R32]  - Primary .

## 2021-04-05 ENCOUNTER — HOSPITAL ENCOUNTER (OUTPATIENT)
Facility: CLINIC | Age: 86
Setting detail: SPECIMEN
Discharge: HOME | End: 2021-04-05
Attending: INTERNAL MEDICINE | Admitting: INTERNAL MEDICINE
Payer: MEDICARE

## 2021-04-05 ENCOUNTER — INFUSION THERAPY VISIT (OUTPATIENT)
Dept: INFUSION THERAPY | Facility: CLINIC | Age: 86
End: 2021-04-05
Attending: INTERNAL MEDICINE
Payer: MEDICARE

## 2021-04-05 DIAGNOSIS — Z95.828 PORT-A-CATH IN PLACE: ICD-10-CM

## 2021-04-05 DIAGNOSIS — C90.00 MULTIPLE MYELOMA NOT HAVING ACHIEVED REMISSION (H): Primary | ICD-10-CM

## 2021-04-05 LAB
ANION GAP SERPL CALCULATED.3IONS-SCNC: 7 MMOL/L (ref 3–14)
BUN SERPL-MCNC: 18 MG/DL (ref 7–30)
CALCIUM SERPL-MCNC: 9.4 MG/DL (ref 8.5–10.1)
CHLORIDE SERPL-SCNC: 109 MMOL/L (ref 94–109)
CO2 SERPL-SCNC: 25 MMOL/L (ref 20–32)
CREAT SERPL-MCNC: 0.89 MG/DL (ref 0.52–1.04)
ERYTHROCYTE [DISTWIDTH] IN BLOOD BY AUTOMATED COUNT: 16 % (ref 10–15)
GFR SERPL CREATININE-BSD FRML MDRD: 57 ML/MIN/{1.73_M2}
GLUCOSE SERPL-MCNC: 100 MG/DL (ref 70–99)
HCT VFR BLD AUTO: 35.3 % (ref 35–47)
HGB BLD-MCNC: 10.4 G/DL (ref 11.7–15.7)
MCH RBC QN AUTO: 24 PG (ref 26.5–33)
MCHC RBC AUTO-ENTMCNC: 29.5 G/DL (ref 31.5–36.5)
MCV RBC AUTO: 81 FL (ref 78–100)
PLATELET # BLD AUTO: 250 10E9/L (ref 150–450)
POTASSIUM SERPL-SCNC: 3.4 MMOL/L (ref 3.4–5.3)
RBC # BLD AUTO: 4.34 10E12/L (ref 3.8–5.2)
SODIUM SERPL-SCNC: 141 MMOL/L (ref 133–144)
WBC # BLD AUTO: 7.9 10E9/L (ref 4–11)

## 2021-04-05 PROCEDURE — 999N001036 HC STATISTIC TOTAL PROTEIN: Performed by: INTERNAL MEDICINE

## 2021-04-05 PROCEDURE — 86334 IMMUNOFIX E-PHORESIS SERUM: CPT | Mod: TC | Performed by: INTERNAL MEDICINE

## 2021-04-05 PROCEDURE — 85027 COMPLETE CBC AUTOMATED: CPT | Performed by: INTERNAL MEDICINE

## 2021-04-05 PROCEDURE — 86334 IMMUNOFIX E-PHORESIS SERUM: CPT | Performed by: INTERNAL MEDICINE

## 2021-04-05 PROCEDURE — 83883 ASSAY NEPHELOMETRY NOT SPEC: CPT | Performed by: INTERNAL MEDICINE

## 2021-04-05 PROCEDURE — 82784 ASSAY IGA/IGD/IGG/IGM EACH: CPT | Performed by: INTERNAL MEDICINE

## 2021-04-05 PROCEDURE — 85027 COMPLETE CBC AUTOMATED: CPT

## 2021-04-05 PROCEDURE — 86334 IMMUNOFIX E-PHORESIS SERUM: CPT | Mod: 26 | Performed by: PATHOLOGY

## 2021-04-05 PROCEDURE — 36591 DRAW BLOOD OFF VENOUS DEVICE: CPT

## 2021-04-05 PROCEDURE — 80048 BASIC METABOLIC PNL TOTAL CA: CPT

## 2021-04-05 PROCEDURE — 250N000011 HC RX IP 250 OP 636: Performed by: INTERNAL MEDICINE

## 2021-04-05 PROCEDURE — 84165 PROTEIN E-PHORESIS SERUM: CPT | Mod: 26 | Performed by: PATHOLOGY

## 2021-04-05 PROCEDURE — 84165 PROTEIN E-PHORESIS SERUM: CPT | Mod: TC | Performed by: INTERNAL MEDICINE

## 2021-04-05 PROCEDURE — 80048 BASIC METABOLIC PNL TOTAL CA: CPT | Performed by: INTERNAL MEDICINE

## 2021-04-05 RX ORDER — HEPARIN SODIUM (PORCINE) LOCK FLUSH IV SOLN 100 UNIT/ML 100 UNIT/ML
500 SOLUTION INTRAVENOUS EVERY 8 HOURS
Status: CANCELLED
Start: 2021-04-05

## 2021-04-05 RX ORDER — HEPARIN SODIUM (PORCINE) LOCK FLUSH IV SOLN 100 UNIT/ML 100 UNIT/ML
500 SOLUTION INTRAVENOUS EVERY 8 HOURS
Status: DISCONTINUED | OUTPATIENT
Start: 2021-04-05 | End: 2021-04-05 | Stop reason: HOSPADM

## 2021-04-05 RX ADMIN — Medication 500 UNITS: at 10:30

## 2021-04-05 NOTE — PROGRESS NOTES
Infusion Nursing Note:  Amira Arreola presents today for port labs.    Patient seen by provider today: No   present during visit today: Not Applicable.    Note: N/A.    Intravenous Access:  Labs drawn without difficulty.  Implanted Port.    Treatment Conditions:  Not Applicable. Labs pending at time of discharge.      Post Infusion Assessment:  Site patent and intact, free from redness, edema or discomfort.  No evidence of extravasations.  Access discontinued per protocol.       Discharge Plan:   Patient discharged in stable condition accompanied by: daughter.  Departure Mode: Wheelchair.    Allyn Corona RN

## 2021-04-06 LAB
ALBUMIN SERPL ELPH-MCNC: 3.5 G/DL (ref 3.7–5.1)
ALPHA1 GLOB SERPL ELPH-MCNC: 0.3 G/DL (ref 0.2–0.4)
ALPHA2 GLOB SERPL ELPH-MCNC: 0.8 G/DL (ref 0.5–0.9)
B-GLOBULIN SERPL ELPH-MCNC: 0.7 G/DL (ref 0.6–1)
GAMMA GLOB SERPL ELPH-MCNC: 0.4 G/DL (ref 0.7–1.6)
IGA SERPL-MCNC: 44 MG/DL (ref 84–499)
IGG SERPL-MCNC: 410 MG/DL (ref 610–1616)
IGM SERPL-MCNC: 43 MG/DL (ref 35–242)
KAPPA LC UR-MCNC: 11.27 MG/DL (ref 0.33–1.94)
KAPPA LC/LAMBDA SER: 25.04 {RATIO} (ref 0.26–1.65)
LAMBDA LC SERPL-MCNC: 0.45 MG/DL (ref 0.57–2.63)
M PROTEIN SERPL ELPH-MCNC: 0 G/DL
PROT PATTERN SERPL ELPH-IMP: ABNORMAL
PROT PATTERN SERPL IFE-IMP: ABNORMAL

## 2021-04-07 ENCOUNTER — VIRTUAL VISIT (OUTPATIENT)
Dept: ONCOLOGY | Facility: CLINIC | Age: 86
End: 2021-04-07
Attending: INTERNAL MEDICINE
Payer: MEDICARE

## 2021-04-07 DIAGNOSIS — C90.00 MULTIPLE MYELOMA NOT HAVING ACHIEVED REMISSION (H): Primary | ICD-10-CM

## 2021-04-07 PROCEDURE — 99442 PR PHYSICIAN TELEPHONE EVALUATION 11-20 MIN: CPT | Mod: 95 | Performed by: INTERNAL MEDICINE

## 2021-04-07 ASSESSMENT — PAIN SCALES - GENERAL: PAINLEVEL: NO PAIN (0)

## 2021-04-07 NOTE — LETTER
4/7/2021         RE: Amira Arreola  [address]  [place] [address]        Dear Colleague,    Thank you for referring your patient, Amira Arreola, to the [facility] [place]. Please see a copy of my visit note below.    Amira is a 88 year old who is being evaluated via a billable telephone visit.      What phone number would you like to be contacted at? [phone]    How would you like to obtain your AVS? 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     1. On 09/21/2015, WBC of 4.2, hemoglobin of 13.2 and platelets of 138.    -On 09/29/2015, SPEP does not reveal any M-spike.   -On 10/02/2015, JANET does not reveal any monoclonal protein.     -On 10/22/2015, urine immunofixation reveals monoclonal free kappa light chain.    2. On 05/11/2016, kappa light chain of 50, lambda light chain of 0.32 and ratio of kappa to lambda of 156.2.  3. Bone marrow biopsy on 05/25/2016 reveals 40-50% kappa light chain restricted plasma cells.  Cytogenetics is normal. FISH panel reveals translocation 11;14.    4. MRI of bones on 06/21/2016 and 06/22/2016 reveals myeloma lesions.  5. On 08/24/2016, she was started on revlimid with dexamethasone 20 mg weekly. She did not have any significant response to treatment.   6. Velcade and dexamethasone started on 03/21/2017.    7. Daratumumab added to velcade and dexamethasone on 05/31/2017.   -Velcade given every 14 days starting 08/01/2018.  -Treatment on hold. Last Velcade was on 06/05/2019.  Last daratumumab was on 05/22/2019. Dexamethasone continued.  8. On 05/22/2019, kappa free light chain of 1.21.      SUBJECTIVE:  Ms. Arreola is an 88-year-old female with kappa-free light chain multiple myeloma.  We are giving her weekly dexamethasone.  Other treatments have been discontinued.      The patient's overall condition  has been slowly declining.  She has been getting weaker.      The patient has back pain.  Tylenol helps.  No headache.  No dizziness.  No recent fall.  No nausea or vomiting.  Appetite is decreasing.  No bleeding.  No fever or chills.      The patient's son, Dannie, was on the phone.  As per him, the patient is slowly declining with worsening weakness.      PHYSICAL EXAMINATION:  She is alert, oriented x 3.    This was a telephone visit.      LABORATORY DATA:  Reviewed.    -CBC reveals mild anemia.  Normal WBC and platelets.    -BMP is normal.    -SPEP does not reveal any monoclonal protein.    -Immunofixation does not reveal monoclonal protein.    -Kappa-free light chain is increasing.      ASSESSMENT:   1.  An 88-year-old female with kappa-free light chain multiple myeloma.   2.  Chronic back pain, stable.   3.  Worsening fatigue secondary to her age and other medical problems.      PLAN:   1.  Labs were all reviewed.  Discussed regarding myeloma.  Patient's kappa-free light chain is slowly increasing.  Her myeloma is slowly getting worse.      2.  Discussed regarding treatment.  She is on dexamethasone 20 mg once a week. No other myeloma treatment.      The patient is 88 years old.  Her overall condition is slowly declining.  Taking that into account, I discussed regarding stopping monitoring for myeloma. Because of her worsening condition, she will not be a candidate for myeloma treatment.      Son mentioned that he is going to discuss this with his other family members and then make a decision.  In the meantime, the patient will be continued on weekly dexamethasone 20 mg.      3.  The patient has been on acyclovir.  We will discontinue it as she is not on active myeloma treatment.      4.  She used to be on Zometa.  We are holding it.  No plan to resume it.      5.  I will see her in 3 months' time.  Advised her to call us with any questions or concerns.      TOTAL TELEPHONE TIME:  7 minutes; another 5 minutes  spent in reviewing chart and investigations today.         ITZ LOPEZ MD             D: 2021   T: 2021   MT: BALBINA      Name:     ALBERTO PARSON   MRN:      [id number]        Account:      [id number]   :      1932           Visit Date:   2021      Document: [id number]      This office note has been dictated.          Again, thank you for allowing me to participate in the care of your patient.        Sincerely,        Itz Lopez MD

## 2021-04-07 NOTE — LETTER
4/7/2021         RE: Amira Arreola  [address]  [place] [address]        Dear Colleague,    Thank you for referring your patient, Amira Arreola, to the [facility] [place]. Please see a copy of my visit note below.    Amira is a 88 year old who is being evaluated via a billable telephone visit.      What phone number would you like to be contacted at? [phone]    How would you like to obtain your AVS? 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     1. On 09/21/2015, WBC of 4.2, hemoglobin of 13.2 and platelets of 138.    -On 09/29/2015, SPEP does not reveal any M-spike.   -On 10/02/2015, JANET does not reveal any monoclonal protein.     -On 10/22/2015, urine immunofixation reveals monoclonal free kappa light chain.    2. On 05/11/2016, kappa light chain of 50, lambda light chain of 0.32 and ratio of kappa to lambda of 156.2.  3. Bone marrow biopsy on 05/25/2016 reveals 40-50% kappa light chain restricted plasma cells.  Cytogenetics is normal. FISH panel reveals translocation 11;14.    4. MRI of bones on 06/21/2016 and 06/22/2016 reveals myeloma lesions.  5. On 08/24/2016, she was started on revlimid with dexamethasone 20 mg weekly. She did not have any significant response to treatment.   6. Velcade and dexamethasone started on 03/21/2017.    7. Daratumumab added to velcade and dexamethasone on 05/31/2017.   -Velcade given every 14 days starting 08/01/2018.  -Treatment on hold. Last Velcade was on 06/05/2019.  Last daratumumab was on 05/22/2019. Dexamethasone continued.  8. On 05/22/2019, kappa free light chain of 1.21.      SUBJECTIVE:  Ms. Arreola is an 88-year-old female with kappa-free light chain multiple myeloma.  We are giving her weekly dexamethasone.  Other treatments have been discontinued.      The patient's overall condition  has been slowly declining.  She has been getting weaker.      The patient has back pain.  Tylenol helps.  No headache.  No dizziness.  No recent fall.  No nausea or vomiting.  Appetite is decreasing.  No bleeding.  No fever or chills.      The patient's son, Dannie, was on the phone.  As per him, the patient is slowly declining with worsening weakness.      PHYSICAL EXAMINATION:  She is alert, oriented x 3.    This was a telephone visit.      LABORATORY DATA:  Reviewed.    -CBC reveals mild anemia.  Normal WBC and platelets.    -BMP is normal.    -SPEP does not reveal any monoclonal protein.    -Immunofixation does not reveal monoclonal protein.    -Kappa-free light chain is increasing.      ASSESSMENT:   1.  An 88-year-old female with kappa-free light chain multiple myeloma.   2.  Chronic back pain, stable.   3.  Worsening fatigue secondary to her age and other medical problems.      PLAN:   1.  Labs were all reviewed.  Discussed regarding myeloma.  Patient's kappa-free light chain is slowly increasing.  Her myeloma is slowly getting worse.      2.  Discussed regarding treatment.  She is on dexamethasone 20 mg once a week. No other myeloma treatment.      The patient is 88 years old.  Her overall condition is slowly declining.  Taking that into account, I discussed regarding stopping monitoring for myeloma. Because of her worsening condition, she will not be a candidate for myeloma treatment.      Son mentioned that he is going to discuss this with his other family members and then make a decision.  In the meantime, the patient will be continued on weekly dexamethasone 20 mg.      3.  The patient has been on acyclovir.  We will discontinue it as she is not on active myeloma treatment.      4.  She used to be on Zometa.  We are holding it.  No plan to resume it.      5.  I will see her in 3 months' time.  Advised her to call us with any questions or concerns.      TOTAL TELEPHONE TIME:  7 minutes; another 5 minutes  spent in reviewing chart and investigations today.         ITZ LOPEZ MD             D: 2021   T: 2021   MT: BALBINA      Name:     ALBERTO PARSON   MRN:      [id number]        Account:      [id number]   :      1932           Visit Date:   2021      Document: [id number]      This office note has been dictated.          Again, thank you for allowing me to participate in the care of your patient.        Sincerely,        Itz Lopez MD

## 2021-04-07 NOTE — PROGRESS NOTES
Amira is a 88 year old who is being evaluated via a billable telephone visit.      What phone number would you like to be contacted at? [phone]    How would you like to obtain your AVS? Johan  Phone call duration: 5 minutes

## 2021-04-07 NOTE — PATIENT INSTRUCTIONS
1. Continue weekly dexamethasone.  2. Stop acyclovir.  3. Follow up in 3 months with labs. Labs to be done few days before appointment.

## 2021-04-07 NOTE — TELEPHONE ENCOUNTER
Patients daughter Eufemia called reporting [employer] faxed form for terry external catherer 04/05/2021 att Justin. Triage was unable to find fax. Called [facility] at [phone].Triage was transferred to answering service. Unable to get forms.     Called Eufemia. Informed her triage was unable to get forms yet.  Will continue to call [employer].

## 2021-04-08 ENCOUNTER — TELEPHONE (OUTPATIENT)
Dept: FAMILY MEDICINE | Facility: CLINIC | Age: 86
End: 2021-04-08

## 2021-04-08 NOTE — TELEPHONE ENCOUNTER
TC can you please verify form from [employer] was received today. Order is for wick external catheter.

## 2021-04-09 NOTE — TELEPHONE ENCOUNTER
TC & Dr. Frias confirmed that form was not received. Left message with answering service to please resend fax to [phone].     Awaiting fax.

## 2021-04-11 NOTE — PROGRESS NOTES
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     1. On 09/21/2015, WBC of 4.2, hemoglobin of 13.2 and platelets of 138.    -On 09/29/2015, SPEP does not reveal any M-spike.   -On 10/02/2015, JANET does not reveal any monoclonal protein.     -On 10/22/2015, urine immunofixation reveals monoclonal free kappa light chain.    2. On 05/11/2016, kappa light chain of 50, lambda light chain of 0.32 and ratio of kappa to lambda of 156.2.  3. Bone marrow biopsy on 05/25/2016 reveals 40-50% kappa light chain restricted plasma cells.  Cytogenetics is normal. FISH panel reveals translocation 11;14.    4. MRI of bones on 06/21/2016 and 06/22/2016 reveals myeloma lesions.  5. On 08/24/2016, she was started on revlimid with dexamethasone 20 mg weekly. She did not have any significant response to treatment.   6. Velcade and dexamethasone started on 03/21/2017.    7. Daratumumab added to velcade and dexamethasone on 05/31/2017.   -Velcade given every 14 days starting 08/01/2018.  -Treatment on hold. Last Velcade was on 06/05/2019.  Last daratumumab was on 05/22/2019. Dexamethasone continued.  8. On 05/22/2019, kappa free light chain of 1.21.      SUBJECTIVE:  Ms. Arreola is an 88-year-old female with kappa-free light chain multiple myeloma.  We are giving her weekly dexamethasone.  Other treatments have been discontinued.      The patient's overall condition has been slowly declining.  She has been getting weaker.      The patient has back pain.  Tylenol helps.  No headache.  No dizziness.  No recent fall.  No nausea or vomiting.  Appetite is decreasing.  No bleeding.  No fever or chills.      The patient's son, Dannie, was on the phone.  As per him, the patient is slowly declining with worsening weakness.      PHYSICAL EXAMINATION:  She is alert, oriented x 3.    This was a telephone visit.      LABORATORY DATA:  Reviewed.    -CBC reveals mild anemia.   Normal WBC and platelets.    -BMP is normal.    -SPEP does not reveal any monoclonal protein.    -Immunofixation does not reveal monoclonal protein.    -Kappa-free light chain is increasing.      ASSESSMENT:   1.  An 88-year-old female with kappa-free light chain multiple myeloma.   2.  Chronic back pain, stable.   3.  Worsening fatigue secondary to her age and other medical problems.      PLAN:   1.  Labs were all reviewed.  Discussed regarding myeloma.  Patient's kappa-free light chain is slowly increasing.  Her myeloma is slowly getting worse.      2.  Discussed regarding treatment.  She is on dexamethasone 20 mg once a week. No other myeloma treatment.      The patient is 88 years old.  Her overall condition is slowly declining.  Taking that into account, I discussed regarding stopping monitoring for myeloma. Because of her worsening condition, she will not be a candidate for myeloma treatment.      Son mentioned that he is going to discuss this with his other family members and then make a decision.  In the meantime, the patient will be continued on weekly dexamethasone 20 mg.      3.  The patient has been on acyclovir.  We will discontinue it as she is not on active myeloma treatment.      4.  She used to be on Zometa.  We are holding it.  No plan to resume it.      5.  I will see her in 3 months' time.  Advised her to call us with any questions or concerns.      TOTAL TELEPHONE TIME:  7 minutes; another 5 minutes spent in reviewing chart and investigations today.         ITZ LOPEZ MD             D: 2021   T: 2021   MT: BALBINA      Name:     ALBERTO PARSON   MRN:      [id number]        Account:      [id number]   :      1932           Visit Date:   2021      Document: [id number]

## 2021-04-13 ENCOUNTER — OFFICE VISIT (OUTPATIENT)
Dept: FAMILY MEDICINE | Facility: CLINIC | Age: 86
End: 2021-04-13
Payer: MEDICARE

## 2021-04-13 VITALS
OXYGEN SATURATION: 99 % | DIASTOLIC BLOOD PRESSURE: 53 MMHG | HEIGHT: 65 IN | TEMPERATURE: 97.1 F | BODY MASS INDEX: 25.16 KG/M2 | WEIGHT: 151 LBS | HEART RATE: 72 BPM | SYSTOLIC BLOOD PRESSURE: 105 MMHG

## 2021-04-13 DIAGNOSIS — G62.0 DRUG-INDUCED POLYNEUROPATHY (H): ICD-10-CM

## 2021-04-13 DIAGNOSIS — C79.51 CANCER, METASTATIC TO BONE (H): Primary | ICD-10-CM

## 2021-04-13 DIAGNOSIS — D69.6 THROMBOCYTOPENIA (H): ICD-10-CM

## 2021-04-13 DIAGNOSIS — I50.32 CHRONIC DIASTOLIC HEART FAILURE (H): ICD-10-CM

## 2021-04-13 DIAGNOSIS — I77.810 ASCENDING AORTA DILATATION (H): ICD-10-CM

## 2021-04-13 DIAGNOSIS — I48.91 ATRIAL FIBRILLATION, UNSPECIFIED TYPE (H): ICD-10-CM

## 2021-04-13 DIAGNOSIS — C90.00 MULTIPLE MYELOMA NOT HAVING ACHIEVED REMISSION (H): ICD-10-CM

## 2021-04-13 DIAGNOSIS — I10 BENIGN ESSENTIAL HYPERTENSION: ICD-10-CM

## 2021-04-13 PROCEDURE — 99214 OFFICE O/P EST MOD 30 MIN: CPT | Performed by: INTERNAL MEDICINE

## 2021-04-13 ASSESSMENT — MIFFLIN-ST. JEOR: SCORE: 1115.81

## 2021-04-13 NOTE — PROGRESS NOTES
This is a very pleasant 88-year-old female who is here with her 2 daughters.  One is an ophthalmologist from [place].  The patient lives at home with her  and one of her sons and has some help 3 days a week.  I reviewed the chart notes including the recent oncology note as well as labs.    The patient has myeloma with mets to the bone.  She has polyneuropathy as well as ascending aortic dilatation and thrombocytopenia.  She has heart failure with preserved ejection fraction and has regular follow-up with cardiology for this.  She presents today in a wheelchair.    Overall she is really doing quite well.  They have a regimen now of diuretic use so she is able to use it most days without significant incontinence.  Occasionally she can be slightly lightheaded when she stands.  She is not having chest pain.  Occasionally some shortness of breath but that sounds very stable.  Her weight is up as noted and her blood pressure is low.  She is not on diuretics.  She denies chest pain stomach pain abdominal pain or nausea.  No urine burning or blood.  She takes anticoagulation regularly and has not been following.Overall she is really doing quite well.  They have a regimen now of diuretic use so she is able to use it most days without significant incontinence.  Occasionally she can be slightly lightheaded when she stands.  She is not having chest pain.  Occasionally some shortness of breath but that sounds very stable.  Her weight is up as noted and her blood pressure is low.  She is not on diuretics.  She denies chest pain stomach pain abdominal pain or nausea.  No urine burning or blood.  She takes anticoagulation regularly and has not been falling    Past Medical History:   Diagnosis Date     Abnormal CXR 2018    then ct done and not significant     Acute diastolic heart failure (H) 03/22/2019    nl ef, 2+mr and tr with severe pulm htn     Ascending aorta dilatation (H) 04/2016    on echo, mild, fu 7/18 4.0,  slightly larger     Cancer, metastatic to bone (H)     due to myeloma     Colonic polyp     adenomatous, fu  tics only     Compression fracture     multiple areas of spine     Dry eyes      Elevated MCV     b12 and folic acid nl     HTN (hypertension) 2000    off meds for years     Lung nodule 2018    on ct, 4mm, ct done for fu abnl cxr     Menorrhagia     hysteroscopy and d and c done     MGUS (monoclonal gammopathy of unknown significance)     eval by Dr. Roberts     Moderate to severe pulmonary hypertension (H) 2019    on echo     Multiple myeloma (H) 2016    dx  at [place], bone lesions seen on mri      OAB (overactive bladder)     Dr. Grullon     Osteoporosis     fu done  and stable, went off meds then, fu done ; has had gyn fu and added evista  by gyn     Palpitations     nl echo, mildly dilated asc aorta     Paroxysmal atrial fibrillation (H) 2016    had palp and ziopatch showed it, echo nl lv fxn, mild mr and tr, added coum and toprol, toprol dose raised 16; hosp  for this 3x, then had av solomon ablation and ppm      Sciatica of left side     Dr. Helen Ricardo 2004     SVT (supraventricular tachycardia) (H)     on ziopatch     Thrombocytopenia (H)      Past Surgical History:   Procedure Laterality Date     BONE MARROW BIOPSY, BONE SPECIMEN, NEEDLE/TROCAR N/A 2016    Procedure: BIOPSY BONE MARROW;  Surgeon: Bryan Patel MD;  Location:  GI     CATARACT IOL, RT/LT        SECTION  1965, 1966     COLONOSCOPY  2013    Procedure: COLONOSCOPY;  COLONOSCOPY;  Surgeon: Steffany Rockwell MD;  Location:  GI     EP ABLATION AV NODE N/A 2019    Procedure: EP Ablation AV Node;  Surgeon: Lee Menchaca MD;  Location:  [facility]ARD[facility]ATH LAB     EP PACEMAKER N/A 2019    Procedure: EP Pacemaker;  Surgeon: Lee Menchaca MD;  Location:  HEART CARDIAC CATH LAB     EXCISE EXOSTOSIS  TIBIA / FIBULA  6/30/2014    Procedure: EXCISE EXOSTOSIS TIBIA / FIBULA;  Surgeon: Naila Pichardo MD;  Location:  SD     hysteroscopy and d and c  2002    due to bleeding     left anle replacement  2007     right ankle surgery  2003     Social History     Socioeconomic History     Marital status:      Spouse name: Tom     Number of children: 6     Years of education: Not on file     Highest education level: Not on file   Occupational History     Occupation: rn anesthetist     Employer: RETIRED   Social Needs     Financial resource strain: Not on file     Food insecurity     Worry: Not on file     Inability: Not on file     Transportation needs     Medical: Not on file     Non-medical: Not on file   Tobacco Use     Smoking status: Never Smoker     Smokeless tobacco: Never Used   Substance and Sexual Activity     Alcohol use: No     Alcohol/week: 0.0 standard drinks     Drug use: No     Sexual activity: Never   Lifestyle     Physical activity     Days per week: Not on file     Minutes per session: Not on file     Stress: Not on file   Relationships     Social connections     Talks on phone: Not on file     Gets together: Not on file     Attends Jainism service: Not on file     Active member of club or organization: Not on file     Attends meetings of clubs or organizations: Not on file     Relationship status: Not on file     Intimate partner violence     Fear of current or ex partner: Not on file     Emotionally abused: Not on file     Physically abused: Not on file     Forced sexual activity: Not on file   Other Topics Concern     Parent/sibling w/ CABG, MI or angioplasty before 65F 55M? Not Asked   Social History Narrative     Not on file     Current Outpatient Medications   Medication Sig Dispense Refill     acetaminophen (TYLENOL) 500 MG tablet Take 500 mg by mouth every 6 hours as needed for mild pain        Carboxymethylcellulose Sod PF (REFRESH PLUS) 0.5 % SOLN ophthalmic solution 1 drop 4  "times daily as needed for dry eyes       dexamethasone (DECADRON) 4 MG tablet Take 4 mg by mouth 20 mg one day week on Wednesday       furosemide (LASIX) 20 MG tablet Take 3 (60mg) tablets daily, if weight > 137 pounds take an additional 20mg (1 tablet) 270 tablet 0     latanoprost (XALATAN) 0.005 % ophthalmic solution Place 1 drop into the right eye At Bedtime       lidocaine-prilocaine (EMLA) cream Apply to port site 1 hour prior to access 30 g 1     Multiple Vitamins-Minerals (DAILY MULTIVITAMIN) CAPS Take 1 tablet by mouth daily        nystatin (MYCOSTATIN) 995623 UNIT/GM external cream Apply topically 2 times daily 30 g 0     order for DME Equipment being ordered: Nebulizer with tubes/mask/acessories, use as directed 1 Device 0     oxyCODONE (ROXICODONE) 5 MG tablet Take half a pill every 12 hours as needed for pain. 30 tablet 0     polyethylene glycol (MIRALAX/GLYCOLAX) powder Take 17 g by mouth daily as needed for constipation        potassium chloride ER (KLOR-CON M) 20 MEQ CR tablet Take 1 tablet 2 x a day, with an additional 1 tablet on days when you take extra fursoemide 180 tablet 0     SIMBRINZA 1-0.2 % ophthalmic suspension Place 1 drop into the right eye 2 times daily 1 drop AM and PM  2     Sodium Fluoride (SF 5000 PLUS) 1.1 % CREA Apply to affected area 3 times daily       triamcinolone (KENALOG) 0.1 % external cream Apply topically 2 times daily 15 g 1     warfarin (COUMADIN) 4 MG tablet As directed.       Allergies   Allergen Reactions     Blood Transfusion Related (Informational Only)      Blood antigens related to receiving Darzelex-AG     Penicillin [Penicillins] Rash     Blotches on chest      FAMILY HISTORY NOTED AND REVIEWED    REVIEW OF SYSTEMS: above    PHYSICAL EXAM    /53 (BP Location: Right arm, Patient Position: Sitting)   Pulse 72   Temp 97.1  F (36.2  C) (Temporal)   Ht 1.651 m (5' 5\")   Wt 68.5 kg (151 lb)   SpO2 99%   BMI 25.13 kg/m      Patient appears non " toxic  Patient examined in w.c  Lungs faint ll crackles  cv reglar rate and rhythm  Abdomen non-tender  Min lower leg edema  Wax in canals, to be flushed    ASSESSMENT:  1. Myeloma with mets, per onc holding treatment.  2. End of life issues.  I did discuss with the patient and her 2 daughters end-of-life issues and the importance for her to decide what she would like and to discuss this with the family.  3. Hfpef, stable, call if shortness of breath worsens  4. Drug induced neurop  5. asc aortic dil, no follow up needed  6. Afib, on cum    PLAN:  Flush ears  Follow up onc  Labs per onc  Consider code status  Call if problems    Addy Frias M.D.

## 2021-04-14 ENCOUNTER — TELEPHONE (OUTPATIENT)
Dept: ONCOLOGY | Facility: CLINIC | Age: 86
End: 2021-04-14

## 2021-04-14 NOTE — TELEPHONE ENCOUNTER
Spoke to Dr. Eufemia Arreola, patient's daughter.  We discussed regarding patient's condition and future plan for myeloma treatment in case myeloma continues to get worse.  After discussion, plan at this time is to monitor patient's lab every 3 months.  When kappa free light chain goes above 25, will consider treatment if patient's overall condition is stable. Will treat her with daratumumab based regimen.

## 2021-04-15 ENCOUNTER — ANTICOAGULATION THERAPY VISIT (OUTPATIENT)
Dept: FAMILY MEDICINE | Facility: CLINIC | Age: 86
End: 2021-04-15

## 2021-04-15 DIAGNOSIS — I48.0 PAROXYSMAL ATRIAL FIBRILLATION (H): ICD-10-CM

## 2021-04-15 DIAGNOSIS — Z79.01 LONG TERM CURRENT USE OF ANTICOAGULANT THERAPY: ICD-10-CM

## 2021-04-15 LAB — INR PPP: 3.5 (ref 0.9–1.1)

## 2021-04-15 NOTE — PROGRESS NOTES
ANTICOAGULATION MANAGEMENT     Patient Name:  Amira Arreola  Date:  4/15/2021    ASSESSMENT /SUBJECTIVE:    Today's INR result of 3.5 is supratherapeutic. Goal INR of 2.0-3.0      Warfarin dose taken: Warfarin taken as instructed    Diet: Decreased greens/vitamin K in diet; plans to resume previous intake    Medication changes/ interactions: No new medications/supplements affecting INR    Previous INR: Therapeutic     S/S of bleeding or thromboembolism: No    New injury or illness: No    Upcoming surgery, procedure or cardioversion: No    Additional findings: None      PLAN:    Telephone call with  SHELLIE Vance regarding INR result and instructed:     Warfarin Dosing Instructions: Hold today then continue your current warfarin dose of 2mg every Mon, Wed, Fri; 4mg all other days    Instructed patient to follow up no later than: 2 weeks  Orders given to  Homecare nurse/facility to recheck    Education provided: Target INR goal and significance of current INR result      Rajiv verbalizes understanding and agrees to warfarin dosing plan.    Instructed to call the Anticoagulation Clinic for any changes, questions or concerns. (#[phone])        Ernst Carlson RN      OBJECTIVE:  Recent labs: (last 7 days)     04/15/21   INR 3.5*         No question data found.  Anticoagulation Summary  As of 4/15/2021    INR goal:  2.0-3.0   TTR:  59.2 % (1 y)   INR used for dosing:  3.5 (4/15/2021)   Warfarin maintenance plan:  2 mg (4 mg x 0.5) every Mon, Wed, Fri; 4 mg (4 mg x 1) all other days   Full warfarin instructions:  2 mg every Mon, Wed, Fri; 4 mg all other days   Weekly warfarin total:  22 mg   No change documented:  Ernst Carlson RN   Plan last modified:  Suyapa Walton RN (12/31/2020)   Next INR check:  4/29/2021   Priority:  Maintenance   Target end date:  Indefinite    Indications    Long term current use of anticoagulant therapy [Z79.01]  Atrial fibrillation (H) [I48.91] (Resolved) [I48.91]  Paroxysmal atrial  fibrillation (H) [I48.0]             Anticoagulation Episode Summary     INR check location:      Preferred lab:  EXTERNAL LAB    Send INR reminders to:  DANTE SUMNER    Comments:   Rajiv RN [facility] [phone] private pay nursing visits to check INR      Anticoagulation Care Providers     Provider Role Specialty Phone number    Addy Frias MD Referring Internal Medicine [phone]

## 2021-04-19 ENCOUNTER — ANCILLARY PROCEDURE (OUTPATIENT)
Dept: CARDIOLOGY | Facility: CLINIC | Age: 86
End: 2021-04-19
Attending: INTERNAL MEDICINE
Payer: MEDICARE

## 2021-04-19 DIAGNOSIS — Z95.0 CARDIAC PACEMAKER IN SITU: ICD-10-CM

## 2021-04-19 PROCEDURE — 93294 REM INTERROG EVL PM/LDLS PM: CPT | Performed by: INTERNAL MEDICINE

## 2021-04-19 PROCEDURE — 93296 REM INTERROG EVL PM/IDS: CPT | Performed by: INTERNAL MEDICINE

## 2021-04-26 ENCOUNTER — OFFICE VISIT (OUTPATIENT)
Dept: CARDIOLOGY | Facility: CLINIC | Age: 86
End: 2021-04-26
Attending: NURSE PRACTITIONER
Payer: MEDICARE

## 2021-04-26 VITALS
OXYGEN SATURATION: 97 % | HEIGHT: 65 IN | SYSTOLIC BLOOD PRESSURE: 104 MMHG | WEIGHT: 150.4 LBS | DIASTOLIC BLOOD PRESSURE: 70 MMHG | BODY MASS INDEX: 25.06 KG/M2 | HEART RATE: 70 BPM

## 2021-04-26 DIAGNOSIS — I50.32 CHRONIC DIASTOLIC HEART FAILURE (H): ICD-10-CM

## 2021-04-26 LAB
ANION GAP SERPL CALCULATED.3IONS-SCNC: 6 MMOL/L (ref 3–14)
BUN SERPL-MCNC: 19 MG/DL (ref 7–30)
CALCIUM SERPL-MCNC: 9.2 MG/DL (ref 8.5–10.1)
CHLORIDE SERPL-SCNC: 109 MMOL/L (ref 94–109)
CO2 SERPL-SCNC: 27 MMOL/L (ref 20–32)
CREAT SERPL-MCNC: 1.05 MG/DL (ref 0.52–1.04)
GFR SERPL CREATININE-BSD FRML MDRD: 47 ML/MIN/{1.73_M2}
GLUCOSE SERPL-MCNC: 99 MG/DL (ref 70–99)
POTASSIUM SERPL-SCNC: 3.9 MMOL/L (ref 3.4–5.3)
SODIUM SERPL-SCNC: 142 MMOL/L (ref 133–144)

## 2021-04-26 PROCEDURE — 99213 OFFICE O/P EST LOW 20 MIN: CPT | Performed by: INTERNAL MEDICINE

## 2021-04-26 PROCEDURE — 80048 BASIC METABOLIC PNL TOTAL CA: CPT | Performed by: NURSE PRACTITIONER

## 2021-04-26 PROCEDURE — 36415 COLL VENOUS BLD VENIPUNCTURE: CPT | Performed by: NURSE PRACTITIONER

## 2021-04-26 RX ORDER — POTASSIUM CHLORIDE 1500 MG/1
TABLET, EXTENDED RELEASE ORAL
Qty: 180 TABLET | Refills: 3 | Status: SHIPPED | OUTPATIENT
Start: 2021-04-26 | End: 2021-04-26

## 2021-04-26 RX ORDER — FUROSEMIDE 20 MG
TABLET ORAL
Qty: 270 TABLET | Refills: 3 | Status: SHIPPED | OUTPATIENT
Start: 2021-04-26 | End: 2022-01-01

## 2021-04-26 RX ORDER — POTASSIUM CHLORIDE 1500 MG/1
TABLET, EXTENDED RELEASE ORAL
Qty: 270 TABLET | Refills: 0 | Status: SHIPPED | OUTPATIENT
Start: 2021-04-26 | End: 2022-01-01

## 2021-04-26 ASSESSMENT — MIFFLIN-ST. JEOR: SCORE: 1113.09

## 2021-04-26 NOTE — LETTER
4/26/2021    Addy Frias MD  [address]  [facility] [address]    RE: Amira Arreola       Dear Colleague,    I had the pleasure of seeing Amira Arreola in the [facility].    HPI and Plan:     Ms. Arreola is a very pleasant 88-year-old female with history of chronic atrial fibrillation s/p AV node ablation in 2019 with pacemaker implantation, chronic preserved ejection fraction congestive heart failure on diuretic therapy, pulmonary hypertension, moderate tricuspid regurgitation, patient reluctance of using diuretic because of frequent urination and some history of dietary noncompliance.  Today patient is coming for routine follow-up.  She is accompanied by her son Dannie.  Patient has no specific cardiac complaints.  She is able to walk with a walker without any issues, no dyspnea on exertion no chest discomfort dizziness presyncope syncope.  No orthopnea.  Her weights have been stable around 150 pounds.  She is on 60 mg of Lasix.  In the past her weights were around 135 pounds but patient is not willing to increase her diuretic therapy.  BMP today shows creatinine of 1.05.  BUN is normal potassium is normal.  Recent device check showed normal sensing and pacing threshold with 8-1/2 years of battery longevity remaining, normal impedance.  Echocardiogram in 2019 showed normal LVEF, moderate mitral and moderate tricuspid regurgitation with severe pulmonary hypertension.    Assessment plan  A pleasant 88-year-old female with chronic atrial fibrillation s/p AV node ablation, pacemaker implantation on Coumadin for stroke prophylaxis, chronic preserved ejection vision congestive heart failure, pulmonary hypertension.  Overall cardiac status wise she appears stable.  Her weights are stable around 150 pounds.  Her baseline dry weight may be slightly lower than this but patient is reluctant to increase diuretic therapy.  I think for now it is reasonable to  continue current regimen of Lasix and she feels quite well with 150 pounds of baseline weight without any symptoms of dyspnea exertion or orthopnea.  She will continue regular device checkup.  We can see her back in core clinic in about 4 months, sooner if she notes any change in clinical status.  Orders Placed This Encounter   Procedures     Basic metabolic panel     Follow-Up with [facility] - TONE visit       Orders Placed This Encounter   Medications     potassium chloride ER (KLOR-CON M) 20 MEQ CR tablet     Sig: Take 1 tablet 2 x a day, with an additional 1 tablet on days when you take extra fursoemide     Dispense:  180 tablet     Refill:  3     furosemide (LASIX) 20 MG tablet     Sig: Take 3 (60mg) tablets daily, if weight > 137 pounds take an additional 20mg (1 tablet)     Dispense:  270 tablet     Refill:  3       Medications Discontinued During This Encounter   Medication Reason     potassium chloride ER (KLOR-CON M) 20 MEQ CR tablet Reorder     furosemide (LASIX) 20 MG tablet Reorder         Encounter Diagnosis   Name Primary?     Chronic diastolic heart failure (H)        CURRENT MEDICATIONS:  Current Outpatient Medications   Medication Sig Dispense Refill     acetaminophen (TYLENOL) 500 MG tablet Take 500 mg by mouth every 6 hours as needed for mild pain        Carboxymethylcellulose Sod PF (REFRESH PLUS) 0.5 % SOLN ophthalmic solution 1 drop 4 times daily as needed for dry eyes       dexamethasone (DECADRON) 4 MG tablet Take 4 mg by mouth 20 mg one day week on Wednesday       furosemide (LASIX) 20 MG tablet Take 3 (60mg) tablets daily, if weight > 137 pounds take an additional 20mg (1 tablet) 270 tablet 3     latanoprost (XALATAN) 0.005 % ophthalmic solution Place 1 drop into the right eye At Bedtime       lidocaine-prilocaine (EMLA) cream Apply to port site 1 hour prior to access 30 g 1     Multiple Vitamins-Minerals (DAILY MULTIVITAMIN) CAPS Take 1 tablet by mouth daily        nystatin (MYCOSTATIN)  082755 UNIT/GM external cream Apply topically 2 times daily 30 g 0     oxyCODONE (ROXICODONE) 5 MG tablet Take half a pill every 12 hours as needed for pain. 30 tablet 0     polyethylene glycol (MIRALAX/GLYCOLAX) powder Take 17 g by mouth daily as needed for constipation        potassium chloride ER (KLOR-CON M) 20 MEQ CR tablet Take 1 tablet 2 x a day, with an additional 1 tablet on days when you take extra fursoemide 180 tablet 3     SIMBRINZA 1-0.2 % ophthalmic suspension Place 1 drop into the right eye 2 times daily 1 drop AM and PM  2     Sodium Fluoride (SF 5000 PLUS) 1.1 % CREA Apply to affected area 3 times daily       triamcinolone (KENALOG) 0.1 % external cream Apply topically 2 times daily 15 g 1     warfarin (COUMADIN) 4 MG tablet As directed.       order for DME Equipment being ordered: Nebulizer with tubes/mask/acessories, use as directed (Patient not taking: Reported on 4/26/2021) 1 Device 0       ALLERGIES     Allergies   Allergen Reactions     Blood Transfusion Related (Informational Only)      Blood antigens related to receiving Darzelex-AG     Penicillin [Penicillins] Rash     Blotches on chest        PAST MEDICAL HISTORY:  Past Medical History:   Diagnosis Date     Abnormal CXR 2018    then ct done and not significant     Acute diastolic heart failure (H) 03/22/2019    nl ef, 2+mr and tr with severe pulm htn     Ascending aorta dilatation (H) 04/2016    on echo, mild, fu 7/18 4.0, slightly larger     Cancer, metastatic to bone (H)     due to myeloma     Colonic polyp 2008    adenomatous, fu 2013 tics only     Compression fracture 2016    multiple areas of spine     Dry eyes      Elevated MCV 2015    b12 and folic acid nl     HTN (hypertension) 2000    off meds for years     Lung nodule 08/2018    on ct, 4mm, ct done for fu abnl cxr     Menorrhagia 2002    hysteroscopy and d and c done     MGUS (monoclonal gammopathy of unknown significance) 2015    eval by Dr. Roberts     Moderate to severe  pulmonary hypertension (H) 2019    on echo     Multiple myeloma (H) 2016    dx  at [place], bone lesions seen on mri      OAB (overactive bladder)     Dr. Grullon     Osteoporosis     fu done  and stable, went off meds then, fu done ; has had gyn fu and added evista  by gyn     Palpitations     nl echo, mildly dilated asc aorta     Paroxysmal atrial fibrillation (H) 2016    had palp and ziopatch showed it, echo nl lv fxn, mild mr and tr, added coum and toprol, toprol dose raised 16; hosp 2019 for this 3x, then had av solomon ablation and ppm      Sciatica of left side     Dr. Helen Ricardo      SVT (supraventricular tachycardia) (H)     on ziopatch     Thrombocytopenia (H)        PAST SURGICAL HISTORY:  Past Surgical History:   Procedure Laterality Date     BONE MARROW BIOPSY, BONE SPECIMEN, NEEDLE/TROCAR N/A 2016    Procedure: BIOPSY BONE MARROW;  Surgeon: Bryan Patel MD;  Location:  GI     CATARACT IOL, RT/LT        SECTION  1965, 1966     COLONOSCOPY  2013    Procedure: COLONOSCOPY;  COLONOSCOPY;  Surgeon: Steffany Rockwell MD;  Location:  GI     EP ABLATION AV NODE N/A 2019    Procedure: EP Ablation AV Node;  Surgeon: Lee Menchaca MD;  Location:  HEART CARDIAC CATH LAB     EP PACEMAKER N/A 2019    Procedure: EP Pacemaker;  Surgeon: Lee Menchaca MD;  Location:  HEART CARDIAC CATH LAB     EXCISE EXOSTOSIS TIBIA / FIBULA  2014    Procedure: EXCISE EXOSTOSIS TIBIA / FIBULA;  Surgeon: Naila Pichardo MD;  Location:  SD     hysteroscopy and d and c      due to bleeding     left anle replacement       right ankle surgery         FAMILY HISTORY:  Family History   Problem Relation Age of Onset     Heart Disease Father      C.A.D. Mother      Cerebrovascular Disease Brother      Family History Negative Sister      Family History Negative Sister      Family History Negative  "Brother        SOCIAL HISTORY:  Social History     Socioeconomic History     Marital status:      Spouse name: Tom     Number of children: 6     Years of education: None     Highest education level: None   Occupational History     Occupation: rn anesthetist     Employer: RETIRED   Social Needs     Financial resource strain: None     Food insecurity     Worry: None     Inability: None     Transportation needs     Medical: None     Non-medical: None   Tobacco Use     Smoking status: Never Smoker     Smokeless tobacco: Never Used   Substance and Sexual Activity     Alcohol use: No     Alcohol/week: 0.0 standard drinks     Drug use: No     Sexual activity: Never   Lifestyle     Physical activity     Days per week: None     Minutes per session: None     Stress: None   Relationships     Social connections     Talks on phone: None     Gets together: None     Attends Anabaptist service: None     Active member of club or organization: None     Attends meetings of clubs or organizations: None     Relationship status: None     Intimate partner violence     Fear of current or ex partner: None     Emotionally abused: None     Physically abused: None     Forced sexual activity: None   Other Topics Concern     Parent/sibling w/ CABG, MI or angioplasty before 65F 55M? Not Asked   Social History Narrative     None       Review of Systems:  Skin:  Negative       Eyes:  Negative      ENT:  Negative      Respiratory:  Positive for dyspnea on exertion wheezing, per son   Cardiovascular:  Negative;palpitations;chest pain;syncope or near-syncope;cyanosis Positive for;edema;fatigue lightheadedness, getting up  Gastroenterology: Negative      Genitourinary:  Positive for urinary frequency    Musculoskeletal:  Positive for back pain    Neurologic:  Negative      Psychiatric:  Negative      Heme/Lymph/Imm:  Positive for allergies    Endocrine:  Negative        Physical Exam:  Vitals: /70   Pulse 70   Ht 1.651 m (5' 5\")   Wt " 68.2 kg (150 lb 6.4 oz)   SpO2 97%   BMI 25.03 kg/m      General patient appears comfortable  Neck normal JVP, negative hepatojugular reflux  Cardiovascular system S1-S2 normal, grade 2 x 6 systolic murmur heard over the apex and left lower sternal border, no S3-S4 rub or gallop  Respiratory some clear to auscultation  Extremities 1+ pitting pedal edema  Neurological alert, oriented    Thank you for allowing me to participate in the care of your patient.      Sincerely,     Ramos Rivera MD     [facility]    cc:   Elisabeth Hicks, APRN CNP  [address]  [place] [address]

## 2021-04-26 NOTE — PROGRESS NOTES
HPI and Plan:     Ms. Arreola is a very pleasant 88-year-old female with history of chronic atrial fibrillation s/p AV node ablation in 2019 with pacemaker implantation, chronic preserved ejection fraction congestive heart failure on diuretic therapy, pulmonary hypertension, moderate tricuspid regurgitation, patient reluctance of using diuretic because of frequent urination and some history of dietary noncompliance.  Today patient is coming for routine follow-up.  She is accompanied by her son Dannie.  Patient has no specific cardiac complaints.  She is able to walk with a walker without any issues, no dyspnea on exertion no chest discomfort dizziness presyncope syncope.  No orthopnea.  Her weights have been stable around 150 pounds.  She is on 60 mg of Lasix.  In the past her weights were around 135 pounds but patient is not willing to increase her diuretic therapy.  BMP today shows creatinine of 1.05.  BUN is normal potassium is normal.  Recent device check showed normal sensing and pacing threshold with 8-1/2 years of battery longevity remaining, normal impedance.  Echocardiogram in 2019 showed normal LVEF, moderate mitral and moderate tricuspid regurgitation with severe pulmonary hypertension.    Assessment plan  A pleasant 88-year-old female with chronic atrial fibrillation s/p AV node ablation, pacemaker implantation on Coumadin for stroke prophylaxis, chronic preserved ejection vision congestive heart failure, pulmonary hypertension.  Overall cardiac status wise she appears stable.  Her weights are stable around 150 pounds.  Her baseline dry weight may be slightly lower than this but patient is reluctant to increase diuretic therapy.  I think for now it is reasonable to continue current regimen of Lasix and she feels quite well with 150 pounds of baseline weight without any symptoms of dyspnea exertion or orthopnea.  She will continue regular device checkup.  We can see her back in core clinic in about 4 months,  sooner if she notes any change in clinical status.  Orders Placed This Encounter   Procedures     Basic metabolic panel     Follow-Up with [facility] - TONE visit       Orders Placed This Encounter   Medications     potassium chloride ER (KLOR-CON M) 20 MEQ CR tablet     Sig: Take 1 tablet 2 x a day, with an additional 1 tablet on days when you take extra fursoemide     Dispense:  180 tablet     Refill:  3     furosemide (LASIX) 20 MG tablet     Sig: Take 3 (60mg) tablets daily, if weight > 137 pounds take an additional 20mg (1 tablet)     Dispense:  270 tablet     Refill:  3       Medications Discontinued During This Encounter   Medication Reason     potassium chloride ER (KLOR-CON M) 20 MEQ CR tablet Reorder     furosemide (LASIX) 20 MG tablet Reorder         Encounter Diagnosis   Name Primary?     Chronic diastolic heart failure (H)        CURRENT MEDICATIONS:  Current Outpatient Medications   Medication Sig Dispense Refill     acetaminophen (TYLENOL) 500 MG tablet Take 500 mg by mouth every 6 hours as needed for mild pain        Carboxymethylcellulose Sod PF (REFRESH PLUS) 0.5 % SOLN ophthalmic solution 1 drop 4 times daily as needed for dry eyes       dexamethasone (DECADRON) 4 MG tablet Take 4 mg by mouth 20 mg one day week on Wednesday       furosemide (LASIX) 20 MG tablet Take 3 (60mg) tablets daily, if weight > 137 pounds take an additional 20mg (1 tablet) 270 tablet 3     latanoprost (XALATAN) 0.005 % ophthalmic solution Place 1 drop into the right eye At Bedtime       lidocaine-prilocaine (EMLA) cream Apply to port site 1 hour prior to access 30 g 1     Multiple Vitamins-Minerals (DAILY MULTIVITAMIN) CAPS Take 1 tablet by mouth daily        nystatin (MYCOSTATIN) 815671 UNIT/GM external cream Apply topically 2 times daily 30 g 0     oxyCODONE (ROXICODONE) 5 MG tablet Take half a pill every 12 hours as needed for pain. 30 tablet 0     polyethylene glycol (MIRALAX/GLYCOLAX) powder Take 17 g by mouth daily as  needed for constipation        potassium chloride ER (KLOR-CON M) 20 MEQ CR tablet Take 1 tablet 2 x a day, with an additional 1 tablet on days when you take extra fursoemide 180 tablet 3     SIMBRINZA 1-0.2 % ophthalmic suspension Place 1 drop into the right eye 2 times daily 1 drop AM and PM  2     Sodium Fluoride (SF 5000 PLUS) 1.1 % CREA Apply to affected area 3 times daily       triamcinolone (KENALOG) 0.1 % external cream Apply topically 2 times daily 15 g 1     warfarin (COUMADIN) 4 MG tablet As directed.       order for DME Equipment being ordered: Nebulizer with tubes/mask/acessories, use as directed (Patient not taking: Reported on 4/26/2021) 1 Device 0       ALLERGIES     Allergies   Allergen Reactions     Blood Transfusion Related (Informational Only)      Blood antigens related to receiving Darzelex-AG     Penicillin [Penicillins] Rash     Blotches on chest        PAST MEDICAL HISTORY:  Past Medical History:   Diagnosis Date     Abnormal CXR 2018    then ct done and not significant     Acute diastolic heart failure (H) 03/22/2019    nl ef, 2+mr and tr with severe pulm htn     Ascending aorta dilatation (H) 04/2016    on echo, mild, fu 7/18 4.0, slightly larger     Cancer, metastatic to bone (H)     due to myeloma     Colonic polyp 2008    adenomatous, fu 2013 tics only     Compression fracture 2016    multiple areas of spine     Dry eyes      Elevated MCV 2015    b12 and folic acid nl     HTN (hypertension) 2000    off meds for years     Lung nodule 08/2018    on ct, 4mm, ct done for fu abnl cxr     Menorrhagia 2002    hysteroscopy and d and c done     MGUS (monoclonal gammopathy of unknown significance) 2015    eval by Dr. Roberts     Moderate to severe pulmonary hypertension (H) 03/2019    on echo     Multiple myeloma (H) 2016    dx 5/16 at [place], bone lesions seen on mri 6/16     OAB (overactive bladder) 2013    Dr. Grullon     Osteoporosis     fu done 2010 and stable, went off meds then, fu done  ; has had gyn fu and added evista  by gyn     Palpitations     nl echo, mildly dilated asc aorta     Paroxysmal atrial fibrillation (H) 2016    had palp and ziopatch showed it, echo nl lv fxn, mild mr and tr, added coum and toprol, toprol dose raised 16; hosp 2019 for this 3x, then had av solomon ablation and ppm      Sciatica of left side     Dr. Helen Ricardo      SVT (supraventricular tachycardia) (H)     on ziopatch     Thrombocytopenia (H)        PAST SURGICAL HISTORY:  Past Surgical History:   Procedure Laterality Date     BONE MARROW BIOPSY, BONE SPECIMEN, NEEDLE/TROCAR N/A 2016    Procedure: BIOPSY BONE MARROW;  Surgeon: Bryan Patel MD;  Location:  GI     CATARACT IOL, RT/LT        SECTION  ,      COLONOSCOPY  2013    Procedure: COLONOSCOPY;  COLONOSCOPY;  Surgeon: Steffany Rockwell MD;  Location:  GI     EP ABLATION AV NODE N/A 2019    Procedure: EP Ablation AV Node;  Surgeon: Lee Menchaca MD;  Location:  HEART CARDIAC CATH LAB     EP PACEMAKER N/A 2019    Procedure: EP Pacemaker;  Surgeon: Lee Menchaca MD;  Location:  HEART CARDIAC CATH LAB     EXCISE EXOSTOSIS TIBIA / FIBULA  2014    Procedure: EXCISE EXOSTOSIS TIBIA / FIBULA;  Surgeon: Naila Pichardo MD;  Location:  SD     hysteroscopy and d and c      due to bleeding     left anle replacement       right ankle surgery         FAMILY HISTORY:  Family History   Problem Relation Age of Onset     Heart Disease Father      C.A.D. Mother      Cerebrovascular Disease Brother      Family History Negative Sister      Family History Negative Sister      Family History Negative Brother        SOCIAL HISTORY:  Social History     Socioeconomic History     Marital status:      Spouse name: Tom     Number of children: 6     Years of education: None     Highest education level: None   Occupational History      "Occupation: rn anesthetist     Employer: RETIRED   Social Needs     Financial resource strain: None     Food insecurity     Worry: None     Inability: None     Transportation needs     Medical: None     Non-medical: None   Tobacco Use     Smoking status: Never Smoker     Smokeless tobacco: Never Used   Substance and Sexual Activity     Alcohol use: No     Alcohol/week: 0.0 standard drinks     Drug use: No     Sexual activity: Never   Lifestyle     Physical activity     Days per week: None     Minutes per session: None     Stress: None   Relationships     Social connections     Talks on phone: None     Gets together: None     Attends Hinduism service: None     Active member of club or organization: None     Attends meetings of clubs or organizations: None     Relationship status: None     Intimate partner violence     Fear of current or ex partner: None     Emotionally abused: None     Physically abused: None     Forced sexual activity: None   Other Topics Concern     Parent/sibling w/ CABG, MI or angioplasty before 65F 55M? Not Asked   Social History Narrative     None       Review of Systems:  Skin:  Negative       Eyes:  Negative      ENT:  Negative      Respiratory:  Positive for dyspnea on exertion wheezing, per son   Cardiovascular:  Negative;palpitations;chest pain;syncope or near-syncope;cyanosis Positive for;edema;fatigue lightheadedness, getting up  Gastroenterology: Negative      Genitourinary:  Positive for urinary frequency    Musculoskeletal:  Positive for back pain    Neurologic:  Negative      Psychiatric:  Negative      Heme/Lymph/Imm:  Positive for allergies    Endocrine:  Negative        Physical Exam:  Vitals: /70   Pulse 70   Ht 1.651 m (5' 5\")   Wt 68.2 kg (150 lb 6.4 oz)   SpO2 97%   BMI 25.03 kg/m      General patient appears comfortable  Neck normal JVP, negative hepatojugular reflux  Cardiovascular system S1-S2 normal, grade 2 x 6 systolic murmur heard over the apex and left " lower sternal border, no S3-S4 rub or gallop  Respiratory some clear to auscultation  Extremities 1+ pitting pedal edema  Neurological alert, oriented      CC  Elisabeth Hicks, APRN CNP  [address]  [place] [address]

## 2021-04-29 ENCOUNTER — ANTICOAGULATION THERAPY VISIT (OUTPATIENT)
Dept: FAMILY MEDICINE | Facility: CLINIC | Age: 86
End: 2021-04-29

## 2021-04-29 DIAGNOSIS — Z79.01 LONG TERM CURRENT USE OF ANTICOAGULANT THERAPY: ICD-10-CM

## 2021-04-29 DIAGNOSIS — I48.0 PAROXYSMAL ATRIAL FIBRILLATION (H): ICD-10-CM

## 2021-04-29 LAB — INR PPP: 1.9 (ref 0.9–1.1)

## 2021-04-29 NOTE — PROGRESS NOTES
ANTICOAGULATION MANAGEMENT     Patient Name:  Amira Arreola  Date:  2021    ASSESSMENT /SUBJECTIVE:    Today's INR result of 1.9 is subtherapeutic. Goal INR of 2.0-3.0      Warfarin dose taken: Warfarin taken as instructed    Diet: No new diet changes affecting INR    Medication changes/ interactions: No new medications/supplements affecting INR    Previous INR: Supratherapeutic     S/S of bleeding or thromboembolism: No    New injury or illness: No    Upcoming surgery, procedure or cardioversion: No    Additional findings: Poor appetite. New HC Number given      PLAN:    Telephone call with home care nurse Heather regarding INR result and instructed:     Warfarin Dosing Instructions: Continue your current warfarin dose 2 mg every Mon, Wed, Fri; 4 mg all other days    Instructed patient to follow up no later than: 2 weeks  Orders given to  Homecare nurse/facility to recheck    Education provided: None required      Heather verbalizes understanding and agrees to warfarin dosing plan.    Instructed to call the Anticoagulation Clinic for any changes, questions or concerns. (#[phone])        Tameka Perez RN      OBJECTIVE:  Recent labs: (last 7 days)     21   INR 1.9*         No question data found.  Anticoagulation Summary  As of 2021    INR goal:  2.0-3.0   TTR:  57.8 % (1 y)   INR used for dosin.9 (2021)   Warfarin maintenance plan:  2 mg (4 mg x 0.5) every Mon, Wed, Fri; 4 mg (4 mg x 1) all other days   Full warfarin instructions:  2 mg every Mon, Wed, Fri; 4 mg all other days   Weekly warfarin total:  22 mg   No change documented:  Tameka Perez RN   Plan last modified:  Suyapa Walton RN (2020)   Next INR check:  2021   Priority:  Maintenance   Target end date:  Indefinite    Indications    Long term current use of anticoagulant therapy [Z79.01]  Atrial fibrillation (H) [I48.91] (Resolved) [I48.91]  Paroxysmal atrial fibrillation (H) [I48.0]              Anticoagulation Episode Summary     INR check location:      Preferred lab:  EXTERNAL LAB    Send INR reminders to:  DANTE SUMNER    Comments:   Rajiv RN [facility] [phone] private pay nursing visits to check INR      Anticoagulation Care Providers     Provider Role Specialty Phone number    Addy Frias MD Referring Internal Medicine [phone]

## 2021-05-04 LAB
MDC_IDC_EPISODE_DTM: NORMAL
MDC_IDC_EPISODE_ID: NORMAL
MDC_IDC_EPISODE_TYPE: NORMAL
MDC_IDC_LEAD_IMPLANT_DT: NORMAL
MDC_IDC_LEAD_LOCATION: NORMAL
MDC_IDC_LEAD_LOCATION_DETAIL_1: NORMAL
MDC_IDC_LEAD_MFG: NORMAL
MDC_IDC_LEAD_MODEL: NORMAL
MDC_IDC_LEAD_POLARITY_TYPE: NORMAL
MDC_IDC_LEAD_SERIAL: NORMAL
MDC_IDC_MSMT_BATTERY_DTM: NORMAL
MDC_IDC_MSMT_BATTERY_REMAINING_LONGEVITY: 102 MO
MDC_IDC_MSMT_BATTERY_REMAINING_PERCENTAGE: 100 %
MDC_IDC_MSMT_BATTERY_STATUS: NORMAL
MDC_IDC_MSMT_LEADCHNL_RV_IMPEDANCE_VALUE: 804 OHM
MDC_IDC_MSMT_LEADCHNL_RV_PACING_THRESHOLD_AMPLITUDE: 0.9 V
MDC_IDC_MSMT_LEADCHNL_RV_PACING_THRESHOLD_PULSEWIDTH: 0.4 MS
MDC_IDC_PG_IMPLANT_DTM: NORMAL
MDC_IDC_PG_MFG: NORMAL
MDC_IDC_PG_MODEL: NORMAL
MDC_IDC_PG_SERIAL: NORMAL
MDC_IDC_PG_TYPE: NORMAL
MDC_IDC_SESS_CLINIC_NAME: NORMAL
MDC_IDC_SESS_DTM: NORMAL
MDC_IDC_SESS_TYPE: NORMAL
MDC_IDC_SET_BRADY_LOWRATE: 70 {BEATS}/MIN
MDC_IDC_SET_BRADY_MAX_SENSOR_RATE: 130 {BEATS}/MIN
MDC_IDC_SET_BRADY_MODE: NORMAL
MDC_IDC_SET_LEADCHNL_RV_PACING_AMPLITUDE: 1.5 V
MDC_IDC_SET_LEADCHNL_RV_PACING_CAPTURE_MODE: NORMAL
MDC_IDC_SET_LEADCHNL_RV_PACING_POLARITY: NORMAL
MDC_IDC_SET_LEADCHNL_RV_PACING_PULSEWIDTH: 0.4 MS
MDC_IDC_SET_LEADCHNL_RV_SENSING_ADAPTATION_MODE: NORMAL
MDC_IDC_SET_LEADCHNL_RV_SENSING_POLARITY: NORMAL
MDC_IDC_SET_LEADCHNL_RV_SENSING_SENSITIVITY: 2.5 MV
MDC_IDC_SET_ZONE_DETECTION_INTERVAL: 375 MS
MDC_IDC_SET_ZONE_TYPE: NORMAL
MDC_IDC_SET_ZONE_VENDOR_TYPE: NORMAL
MDC_IDC_STAT_BRADY_DTM_END: NORMAL
MDC_IDC_STAT_BRADY_DTM_START: NORMAL
MDC_IDC_STAT_BRADY_RV_PERCENT_PACED: 100 %
MDC_IDC_STAT_EPISODE_RECENT_COUNT: 0
MDC_IDC_STAT_EPISODE_RECENT_COUNT_DTM_END: NORMAL
MDC_IDC_STAT_EPISODE_RECENT_COUNT_DTM_START: NORMAL
MDC_IDC_STAT_EPISODE_TYPE: NORMAL
MDC_IDC_STAT_EPISODE_VENDOR_TYPE: NORMAL
MDC_IDC_STAT_EPISODE_VENDOR_TYPE: NORMAL

## 2021-05-13 ENCOUNTER — ANTICOAGULATION THERAPY VISIT (OUTPATIENT)
Dept: FAMILY MEDICINE | Facility: CLINIC | Age: 86
End: 2021-05-13

## 2021-05-13 DIAGNOSIS — I48.0 PAROXYSMAL ATRIAL FIBRILLATION (H): ICD-10-CM

## 2021-05-13 DIAGNOSIS — Z79.01 LONG TERM CURRENT USE OF ANTICOAGULANT THERAPY: ICD-10-CM

## 2021-05-13 LAB — INR PPP: 2.2 (ref 0.9–1.1)

## 2021-05-13 NOTE — PROGRESS NOTES
ANTICOAGULATION MANAGEMENT     Patient Name:  Amira Arreola  Date:  2021    ASSESSMENT /SUBJECTIVE:    Today's INR result of 2.2 is therapeutic. Goal INR of 2.0-3.0      Warfarin dose taken: Warfarin taken as instructed    Diet: No new diet changes affecting INR    Medication changes/ interactions: No new medications/supplements affecting INR    Previous INR: Subtherapeutic     S/S of bleeding or thromboembolism: No    New injury or illness: No    Upcoming surgery, procedure or cardioversion: No    Additional findings: None      PLAN:    Telephone call with home care nurse Jessica regarding INR result and instructed:     Warfarin Dosing Instructions: Continue your current warfarin dose 2mg every Mon, Wed, Fri; 4mg all other days    Instructed patient to follow up no later than: 2 weeks  Orders given to  Homecare nurse/facility to recheck    Education provided: Target INR goal and significance of current INR result      Jessica verbalizes understanding and agrees to warfarin dosing plan.    Instructed to call the Anticoagulation Clinic for any changes, questions or concerns. (#[phone])        Ernst Carlson RN      OBJECTIVE:  Recent labs: (last 7 days)     21   INR 2.2*         No question data found.  Anticoagulation Summary  As of 2021    INR goal:  2.0-3.0   TTR:  56.5 % (1 y)   INR used for dosin.2 (2021)   Warfarin maintenance plan:  2 mg (4 mg x 0.5) every Mon, Wed, Fri; 4 mg (4 mg x 1) all other days   Full warfarin instructions:  2 mg every Mon, Wed, Fri; 4 mg all other days   Weekly warfarin total:  22 mg   No change documented:  Ernst Carlson RN   Plan last modified:  Suyapa Walton RN (2020)   Next INR check:  2021   Priority:  Maintenance   Target end date:  Indefinite    Indications    Long term current use of anticoagulant therapy [Z79.01]  Atrial fibrillation (H) [I48.91] (Resolved) [I48.91]  Paroxysmal atrial fibrillation (H) [I48.0]              Anticoagulation Episode Summary     INR check location:      Preferred lab:  EXTERNAL LAB    Send INR reminders to:  DANTE SUMNER    Comments:   Rajiv RN [facility] [phone] private pay nursing visits to check INR      Anticoagulation Care Providers     Provider Role Specialty Phone number    Addy Frias MD Referring Internal Medicine [phone]

## 2021-05-27 ENCOUNTER — ANTICOAGULATION THERAPY VISIT (OUTPATIENT)
Dept: FAMILY MEDICINE | Facility: CLINIC | Age: 86
End: 2021-05-27

## 2021-05-27 DIAGNOSIS — Z79.01 LONG TERM CURRENT USE OF ANTICOAGULANT THERAPY: ICD-10-CM

## 2021-05-27 DIAGNOSIS — I48.0 PAROXYSMAL ATRIAL FIBRILLATION (H): ICD-10-CM

## 2021-05-27 LAB — INR PPP: 3.1 (ref 0.9–1.1)

## 2021-05-27 NOTE — PROGRESS NOTES
ANTICOAGULATION MANAGEMENT     Patient Name:  Amira Arreola  Date:  5/27/2021    ASSESSMENT /SUBJECTIVE:    Today's INR result of 3.1 is supratherapeutic. Goal INR of 2.0-3.0      Warfarin dose taken: Warfarin taken as instructed    Diet: Not eating as much    Medication changes/ interactions: Increase in tylenol, discomfort when walking    Previous INR: Therapeutic     S/S of bleeding or thromboembolism: No    New injury or illness: No    Upcoming surgery, procedure or cardioversion: No    Additional findings: Less active due to pain, no diarrhea/alcohol      PLAN:    Telephone call with home care nurse Jessica regarding INR result and instructed:     Warfarin Dosing Instructions: Continue your current warfarin dose 2 mg on mon/wed/fri and 4 mg all other days    Instructed patient to follow up no later than: 2 weeks  Orders given to  Homecare nurse/facility to recheck    Education provided: None required      Jessica verbalizes understanding and agrees to warfarin dosing plan.    Instructed to call the Anticoagulation Clinic for any changes, questions or concerns. (#[phone])        Suyapa Walton RN      OBJECTIVE:  Recent labs: (last 7 days)     05/27/21   INR 3.1*         No question data found.  Anticoagulation Summary  As of 5/27/2021    INR goal:  2.0-3.0   TTR:  56.3 % (1 y)   INR used for dosing:  3.1 (5/27/2021)   Warfarin maintenance plan:  2 mg (4 mg x 0.5) every Mon, Wed, Fri; 4 mg (4 mg x 1) all other days   Full warfarin instructions:  2 mg every Mon, Wed, Fri; 4 mg all other days   Weekly warfarin total:  22 mg   No change documented:  Suyapa Walton RN   Plan last modified:  Suyapa Walton RN (12/31/2020)   Next INR check:  6/10/2021   Priority:  Maintenance   Target end date:  Indefinite    Indications    Long term current use of anticoagulant therapy [Z79.01]  Atrial fibrillation (H) [I48.91] (Resolved) [I48.91]  Paroxysmal atrial fibrillation (H) [I48.0]             Anticoagulation Episode  Summary     INR check location:      Preferred lab:  EXTERNAL LAB    Send INR reminders to:  DANTE SUMNER    Comments:   Rajiv RN [facility] [phone] private pay nursing visits to check INR      Anticoagulation Care Providers     Provider Role Specialty Phone number    Addy Frias MD Referring Internal Medicine [phone]

## 2021-06-08 ENCOUNTER — ANTICOAGULATION THERAPY VISIT (OUTPATIENT)
Dept: FAMILY MEDICINE | Facility: CLINIC | Age: 86
End: 2021-06-08

## 2021-06-08 ENCOUNTER — TELEPHONE (OUTPATIENT)
Dept: FAMILY MEDICINE | Facility: CLINIC | Age: 86
End: 2021-06-08

## 2021-06-08 DIAGNOSIS — I48.0 PAROXYSMAL ATRIAL FIBRILLATION (H): ICD-10-CM

## 2021-06-08 DIAGNOSIS — Z79.01 LONG TERM CURRENT USE OF ANTICOAGULANT THERAPY: ICD-10-CM

## 2021-06-08 LAB — INR PPP: 2.1 (ref 0.9–1.1)

## 2021-06-08 NOTE — TELEPHONE ENCOUNTER
Reason for Call:  Home Health Care    Gerardo with [facility] called regarding (reason for call):     Orders are needed for this patient.     Skilled Nursing: every other week for 9 weeks, 3 prn for Rx and disease manegment, including INR    Phone Number Homecare Nurse can be reached at: [phone]    Can we leave a detailed message on this number? YES    Phone number patient can be reached at: [phone]    Best Time: any    Call taken on 6/8/2021 at 12:40 PM by Rosa Garcia

## 2021-06-08 NOTE — PROGRESS NOTES
ANTICOAGULATION MANAGEMENT     Patient Name:  Amira Arreola  Date:  2021    ASSESSMENT /SUBJECTIVE:    Today's INR result of 2.1 is therapeutic. Goal INR of 2.0-3.0      Warfarin dose taken: Warfarin taken as instructed    Diet: No new diet changes affecting INR    Medication changes/ interactions: No new medications/supplements affecting INR    Previous INR: Supratherapeutic     S/S of bleeding or thromboembolism: No    New injury or illness: No    Upcoming surgery, procedure or cardioversion: No    Additional findings: None      PLAN:    Warfarin Dosing Instructions: Continue your current warfarin dose 2 mg every Mon, Wed, Fri; 4 mg all other days    Instructed patient to follow up no later than: 2 weeks  Orders given to  Homecare nurse/facility to recheck    Education provided: Please call back if any changes to your diet, medications or how you've been taking warfarin, Monitoring for bleeding signs and symptoms and Monitoring for clotting signs and symptoms    Telephone call with home care nurse Gerardo whom verbalizes understanding and agrees to plan    Instructed to call the Anticoagulation Clinic for any changes, questions or concerns. (#[phone])        Tameka Perez RN      OBJECTIVE:  Recent labs: (last 7 days)     21   INR 2.1*         No question data found.  Anticoagulation Summary  As of 2021    INR goal:  2.0-3.0   TTR:  59.3 % (1 y)   INR used for dosin.1 (2021)   Warfarin maintenance plan:  2 mg (4 mg x 0.5) every Mon, Wed, Fri; 4 mg (4 mg x 1) all other days   Full warfarin instructions:  2 mg every Mon, Wed, Fri; 4 mg all other days   Weekly warfarin total:  22 mg   No change documented:  Tameka Perez RN   Plan last modified:  Suyapa Walton RN (2020)   Next INR check:  2021   Priority:  Maintenance   Target end date:  Indefinite    Indications    Long term current use of anticoagulant therapy [Z79.01]  Atrial fibrillation (H) [I48.91] (Resolved)  [I48.91]  Paroxysmal atrial fibrillation (H) [I48.0]             Anticoagulation Episode Summary     INR check location:      Preferred lab:  EXTERNAL LAB    Send INR reminders to:  DANTE SUMNER    Comments:   Rajiv HENSLEY [facility] [phone] private pay nursing visits to check INR      Anticoagulation Care Providers     Provider Role Specialty Phone number    Addy Frias MD Referring Internal Medicine [phone]

## 2021-06-10 ENCOUNTER — MEDICAL CORRESPONDENCE (OUTPATIENT)
Dept: HEALTH INFORMATION MANAGEMENT | Facility: CLINIC | Age: 86
End: 2021-06-10

## 2021-06-22 ENCOUNTER — ANTICOAGULATION THERAPY VISIT (OUTPATIENT)
Dept: FAMILY MEDICINE | Facility: CLINIC | Age: 86
End: 2021-06-22

## 2021-06-22 ENCOUNTER — DOCUMENTATION ONLY (OUTPATIENT)
Dept: FAMILY MEDICINE | Facility: CLINIC | Age: 86
End: 2021-06-22

## 2021-06-22 DIAGNOSIS — I48.0 PAROXYSMAL ATRIAL FIBRILLATION (H): Primary | ICD-10-CM

## 2021-06-22 DIAGNOSIS — I48.0 PAROXYSMAL ATRIAL FIBRILLATION (H): ICD-10-CM

## 2021-06-22 DIAGNOSIS — Z79.01 LONG TERM CURRENT USE OF ANTICOAGULANT THERAPY: ICD-10-CM

## 2021-06-22 LAB — INR PPP: 2.6 (ref 0.9–1.1)

## 2021-06-22 NOTE — PROGRESS NOTES
ANTICOAGULATION MANAGEMENT      Amira Arreola due for annual renewal of referral to anticoagulation monitoring. Order pended for your review and signature.      ANTICOAGULATION SUMMARY      Warfarin indication(s)     Atrial fibrillation    Heart valve present?  NO       Current goal range   INR: 2.0-3.0     Goal appropriate for indication? Yes, INR 2-3 appropriate for hx of DVT, PE, hypercoagulable state, Afib, LVAD, or bileaflet AVR without risk factors     Current duration of therapy Indefinite/long term therapy   Time in Therapeutic Range (TTR)  (Goal > 60%) 63%       Office visit with referring provider's group within last year yes on 04/13/2021       Ernst Carlson RN

## 2021-06-22 NOTE — PROGRESS NOTES
ANTICOAGULATION MANAGEMENT     Amira Arreola 88 year old female is on warfarin with therapeutic INR result. (Goal INR 2.0-3.0)    Recent labs: (last 7 days)     06/22/21   INR 2.6*       ASSESSMENT     Source(s): Home Care/Facility Nurse       Warfarin doses taken: Warfarin taken as instructed    Diet: No new diet changes identified    New illness, injury, or hospitalization: No    Medication/supplement changes: None noted    Signs or symptoms of bleeding or clotting: No    Previous INR: Therapeutic last visit; previously outside of goal range    Additional findings: None     PLAN     Recommended plan for no diet, medication or health factor changes affecting INR     Dosing Instructions: Continue your current warfarin dose with next INR in 3 weeks       Summary  As of 6/22/2021    Full warfarin instructions:  2 mg every Mon, Wed, Fri; 4 mg all other days   Next INR check:  7/13/2021             Telephone call with home care nurse Gerardo whom verbalizes understanding and agrees to plan    Orders given to  Homecare nurse/facility to recheck    Education provided: Target INR goal and significance of current INR result    Plan made per [facility] anticoagulation protocol    Ernst Carlson RN  Anticoagulation Clinic  6/22/2021    _______________________________________________________________________     Anticoagulation Episode Summary     Current INR goal:  2.0-3.0   TTR:  63.1 % (1 y)   Target end date:  Indefinite   Send INR reminders to:  DANTE SUMNER    Indications    Long term current use of anticoagulant therapy [Z79.01]  Atrial fibrillation (H) [I48.91] (Resolved) [I48.91]  Paroxysmal atrial fibrillation (H) [I48.0]           Comments:   Rajiv HENSLEY [facility]--[phone] private pay nursing visits to check INR         Anticoagulation Care Providers     Provider Role Specialty Phone number    Addy Frias MD Referring Internal Medicine [phone]

## 2021-06-22 NOTE — MR AVS SNAPSHOT
"              After Visit Summary   2017    Amira Arreola    MRN: [id number]           Patient Information     Date Of Birth          1932        Visit Information        Provider Department      2017 1:30 PM  INFUSION CHAIR [facility]        Today's Diagnoses     Multiple myeloma not having achieved remission (H)    -  1    Paroxysmal atrial fibrillation (H)           Follow-ups after your visit        Who to contact     If you have questions or need follow up information about today's clinic visit or your schedule please contact [facility] directly at [phone].  Normal or non-critical lab and imaging results will be communicated to you by ZipRecruiterhart, letter or phone within 4 business days after the clinic has received the results. If you do not hear from us within 7 days, please contact the clinic through Revolymert or phone. If you have a critical or abnormal lab result, we will notify you by phone as soon as possible.  Submit refill requests through MaxLinear or call your pharmacy and they will forward the refill request to us. Please allow 3 business days for your refill to be completed.          Additional Information About Your Visit        MyChart Information     MaxLinear lets you send messages to your doctor, view your test results, renew your prescriptions, schedule appointments and more. To sign up, go to [email or web address] . Click on \"Log in\" on the left side of the screen, which will take you to the Welcome page. Then click on \"Sign up Now\" on the right side of the page.     You will be asked to enter the access code listed below, as well as some personal information. Please follow the directions to create your username and password.     Your access code is: [email or web address]  Expires: 2017  9:24 AM     Your access code will  in 90 days. If you need help or a new code, please call your [place] clinic or " HISTORY:  Stiven is seen in the clinic for the first time today at the request of Alexey Georges MD with a chief complaint of right leg pain.    The patient is a 17 year old young man who is accompanied to clinic today by his mom.  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  The season did start in late April.  He reports that soon after the start of the track season, he began to develop pain in the distal lateral aspect of his right leg.  There was no history of injury or trauma and he has not had pain like this in the past.  He does not have pain on the left side.  He denies a past history of ankle sprains and he denies instability or mechanical symptoms.  He was initially treated by his  with taping and ultimately was seeing physical therapy with a diagnosis of tendinitis.  This included modalities including dry needling.  He did not get any improvement at all with these treatment modalities.  He had been running the whole time without interruption. He presented to urgent care on June 18 with persistent pain and an x-ray suggested a distal fibula fracture and he is referred here for evaluation.  He is still having symptoms.  Pain is predominantly with running and activities and improved with rest.  He denies swelling or redness or numbness or tingling.    PAST MEDICAL HISTORY:  Reviewed in epic and unremarkable.    CURRENT MEDICATIONS:  Current Outpatient Medications   Medication Sig Dispense Refill   • minocycline (MINOCIN) 100 MG capsule Take 1 capsule by mouth every 12 hours. 60 capsule 2   • adapalene-benzoyl peroxide (Epiduo) 0.1-2.5 % gel Apply pea size amount to entire face nightly. 1 Tube 3     No current facility-administered medications for this visit.        ALLERGIES:  ALLERGIES:  No Known Allergies    PAST SURGICAL HISTORY:  Reviewed in epic and remarkable for inguinal hernia repair    SOCIAL HISTORY:  Social History     Socioeconomic History   • Marital status: Single      Spouse name: Not on file   • Number of children: Not on file   • Years of education: Not on file   • Highest education level: Not on file   Occupational History   • Not on file   Tobacco Use   • Smoking status: Never Smoker   • Smokeless tobacco: Never Used   Substance and Sexual Activity   • Alcohol use: Not on file   • Drug use: Not on file   • Sexual activity: Not on file   Other Topics Concern   • Not on file   Social History Narrative   • Not on file     Social Determinants of Health     Financial Resource Strain:    • Social Determinants: Financial Resource Strain:    Food Insecurity:    • Social Determinants: Food Insecurity:    Transportation Needs:    • Lack of Transportation (Medical):    • Lack of Transportation (Non-Medical):    Physical Activity:    • Days of Exercise per Week:    • Minutes of Exercise per Session:    Stress:    • Social Determinants: Stress:    Social Connections:    • Social Determinants: Social Connections:    Intimate Partner Violence:    • Social Determinants: Intimate Partner Violence Past Fear:    • Social Determinants: Intimate Partner Violence Current Fear:        PHYSICAL EXAMINATION:  GENERAL: The patient is well-nourished, well-developed, and in no acute distress. He is alert and oriented x3.  He has appropriate mood and affect.    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  There is no muscular wasting or atrophy and no swelling or erythema or ecchymosis.  The skin is intact without lesions.  On seated exam, he is tender over the posterolateral aspect of the distal fibular shaft above the ankle.  He is nontender over the lateral malleolus and nontender along the peroneal tendons.  He has minimal equinus contracture.  There is no pain with passive motion of the ankle and hindfoot.  There is no instability of the ankle with stress testing.  Motor is intact with full strength in all motor  "[phone].        Care EveryWhere ID     This is your Care EveryWhere ID. This could be used by other organizations to access your [place] medical records  [id number]        Your Vitals Were     Pulse Temperature Respirations Height Pulse Oximetry BMI (Body Mass Index)    79 98.2  F (36.8  C) (Oral) 20 1.676 m (5' 5.98\") 98% 22.22 kg/m2       Blood Pressure from Last 3 Encounters:   12/06/17 160/79   12/06/17 160/79   11/29/17 122/71    Weight from Last 3 Encounters:   12/06/17 62.4 kg (137 lb 9.6 oz)   12/06/17 62.4 kg (137 lb 9.6 oz)   11/29/17 61.2 kg (135 lb)              We Performed the Following     CBC with platelets differential     INR          Where to get your medicines      Some of these will need a paper prescription and others can be bought over the counter.  Ask your nurse if you have questions.     Bring a paper prescription for each of these medications     oxyCODONE IR 15 MG tablet          Primary Care Provider Office Phone # Fax #    Addy Sean Frias [phone] [address]  [facility] [address]        Equal Access to Services     SARA ZELAYA : Hadii liane crawfordo Soyair, waaxda luqadaha, qaybta kaalmada adeegyada, hung sadler. So [facility] [phone].    ATENCIÓN: Si habla español, tiene a barlow disposición servicios gratuitos de asistencia lingüística. Llame al [phone].    We comply with applicable federal civil rights laws and [place] laws. We do not discriminate on the basis of race, color, national origin, age, disability, sex, sexual orientation, or gender identity.            Thank you!     Thank you for choosing [facility] AND [facility]  for your care. Our goal is always to provide you with excellent care. Hearing back from our patients is one way we can continue to improve our services. Please take a few minutes to complete the written survey that you may receive in the mail after your visit " groups.  Toe flexors and extensors are intact and there is no pain with passive stretch.  Pedal pulses are palpable in the toes are warm with good capillary refill.    RADIOGRAPHS:  Two views of the right tib-fib on June 18th demonstrate some mild cortical thickening and subtle periosteal reaction at the distal fibular shaft    ASSESSMENT AND PLAN:  IMPRESSION:  Right distal fibular stress fracture  The diagnosis and natural history as well as the radiographic images were reviewed with him and his mom and all of their questions were answered.  I have recommended rest and discontinuation of running and any other activities that cause pain.  He will rest and ice and elevate and modify his activities as needed.  He does not require immobilization.  He may take over-the-counter medications as needed.  I will refer him to physical therapy for flexibility exercises and soft tissue stretches and aerobic conditioning as well as custom orthotics.  He does not need a school note.  I will also get some labs, including vitamin-D level and TSH and CMP.  I will call him and his mom with the results.  Otherwise, I would see him back in 6 weeks for clinical re-evaluation.    Radiographs:  Three views right ankle weight-bearing and an AP and lateral right tib-fib prior to being seen.     with us. Thank you!             Your Updated Medication List - Protect others around you: Learn how to safely use, store and throw away your medicines at www.disposemymeds.org.          This list is accurate as of: 12/6/17  2:48 PM.  Always use your most recent med list.                   Brand Name Dispense Instructions for use Diagnosis    acyclovir 400 MG tablet    ZOVIRAX    60 tablet    Take 1 tablet (400 mg) by mouth 2 times daily Viral Prophylaxis.    Multiple myeloma not having achieved remission (H)       CALCIUM CITRATE + PO      Take 2,000 mg by mouth daily 2 tabs        carboxymethylcellulose 0.5 % Soln ophthalmic solution    REFRESH PLUS     1 drop 4 times daily        CLARINEX PO      Take by mouth daily Taking claritin        COMPAZINE PO      Take 10 mg by mouth daily as needed        cycloSPORINE 0.05 % ophthalmic emulsion    RESTASIS     Place 1 drop into both eyes every 12 hours        DAILY MULTIVITAMIN PO      Take 1 tablet by mouth daily.    Routine general medical examination at a health care facility       dexamethasone 4 MG tablet    DECADRON    28 tablet    Take 20mg (5 tablets) by mouth every week on the morning of velcade injection.    Multiple myeloma not having achieved remission (H)       erythromycin ophthalmic ointment    ROMYCIN     Place 1 Application into both eyes At Bedtime        GENTLE STOOL SOFTENER PO      Take 100 mg by mouth daily        lidocaine-prilocaine cream    EMLA    30 g    Apply topically as needed for moderate pain Apply dollop size amount to port site 30-60 min prior to accessing    Multiple myeloma not having achieved remission (H)       LORazepam 0.5 MG tablet    ATIVAN    30 tablet    Take 1 tablet (0.5 mg) by mouth every 8 hours as needed for anxiety    Multiple myeloma not having achieved remission (H)       metoprolol 50 MG 24 hr tablet    TOPROL XL    270 tablet    Take 2 tablets (100mg) in the morning and 1 tablet (50mg) in the evening by mouth daily     Paroxysmal atrial fibrillation (H)       oxyCODONE IR 15 MG tablet    ROXICODONE    60 tablet    Take 1 tablet (15 mg) by mouth every 8 hours as needed for pain maximum 4 tablet(s) per day    Multiple myeloma not having achieved remission (H)       polyethylene glycol powder    MIRALAX/GLYCOLAX     Take 1 capful by mouth daily as needed    Bilateral leg edema       timolol 0.25 % ophthalmic solution    TIMOPTIC     Place 1 drop into the right eye 2 times daily        TYLENOL PO      Take 500 mg by mouth every 6 hours as needed for mild pain or fever        UNABLE TO FIND      MEDICATION NAME: Fresh Coat eye drops        VITAMIN D3 PO      Take 1,000 Units by mouth daily        warfarin 4 MG tablet    COUMADIN    110 tablet    TAKE ONE AND ONE-HALF TABLETS BY MOUTH ON MONDAY, WEDNESDAY, AND FRIDAY AND ONE TABLET THE OTHER DAYS OF THE WEEK    Paroxysmal atrial fibrillation (H), Long-term (current) use of anticoagulants       ZOMETA IV      Inject into the vein every 30 days Every 3 month dosing

## 2021-06-23 DIAGNOSIS — I48.91 ATRIAL FIBRILLATION, UNSPECIFIED TYPE (H): Primary | ICD-10-CM

## 2021-06-23 RX ORDER — WARFARIN SODIUM 4 MG/1
TABLET ORAL
Qty: 90 TABLET | Refills: 1 | Status: SHIPPED | OUTPATIENT
Start: 2021-06-23 | End: 2022-01-01

## 2021-06-23 NOTE — TELEPHONE ENCOUNTER
Historical in chart  Last INR 6-    Please update Rx with updated dosing instructions for PCP approval     Dianne Del Rio RT (R)

## 2021-07-06 ENCOUNTER — HOSPITAL ENCOUNTER (OUTPATIENT)
Facility: CLINIC | Age: 86
Setting detail: SPECIMEN
Discharge: HOME | End: 2021-07-06
Attending: INTERNAL MEDICINE | Admitting: INTERNAL MEDICINE
Payer: MEDICARE

## 2021-07-06 ENCOUNTER — INFUSION THERAPY VISIT (OUTPATIENT)
Dept: INFUSION THERAPY | Facility: CLINIC | Age: 86
End: 2021-07-06
Attending: INTERNAL MEDICINE
Payer: MEDICARE

## 2021-07-06 DIAGNOSIS — Z95.828 PORT-A-CATH IN PLACE: Primary | ICD-10-CM

## 2021-07-06 DIAGNOSIS — C90.00 MULTIPLE MYELOMA NOT HAVING ACHIEVED REMISSION (H): ICD-10-CM

## 2021-07-06 LAB
ANION GAP SERPL CALCULATED.3IONS-SCNC: 3 MMOL/L (ref 3–14)
BUN SERPL-MCNC: 17 MG/DL (ref 7–30)
CALCIUM SERPL-MCNC: 9.3 MG/DL (ref 8.5–10.1)
CHLORIDE SERPL-SCNC: 112 MMOL/L (ref 94–109)
CO2 SERPL-SCNC: 27 MMOL/L (ref 20–32)
CREAT SERPL-MCNC: 0.97 MG/DL (ref 0.52–1.04)
ERYTHROCYTE [DISTWIDTH] IN BLOOD BY AUTOMATED COUNT: 18.2 % (ref 10–15)
GFR SERPL CREATININE-BSD FRML MDRD: 52 ML/MIN/{1.73_M2}
GLUCOSE SERPL-MCNC: 114 MG/DL (ref 70–99)
HCT VFR BLD AUTO: 34.7 % (ref 35–47)
HGB BLD-MCNC: 10.6 G/DL (ref 11.7–15.7)
MCH RBC QN AUTO: 24.8 PG (ref 26.5–33)
MCHC RBC AUTO-ENTMCNC: 30.5 G/DL (ref 31.5–36.5)
MCV RBC AUTO: 81 FL (ref 78–100)
PLATELET # BLD AUTO: 240 10E9/L (ref 150–450)
POTASSIUM SERPL-SCNC: 3.8 MMOL/L (ref 3.4–5.3)
RBC # BLD AUTO: 4.28 10E12/L (ref 3.8–5.2)
SODIUM SERPL-SCNC: 142 MMOL/L (ref 133–144)
WBC # BLD AUTO: 7.3 10E9/L (ref 4–11)

## 2021-07-06 PROCEDURE — 83883 ASSAY NEPHELOMETRY NOT SPEC: CPT | Performed by: INTERNAL MEDICINE

## 2021-07-06 PROCEDURE — 84165 PROTEIN E-PHORESIS SERUM: CPT | Mod: TC | Performed by: INTERNAL MEDICINE

## 2021-07-06 PROCEDURE — 999N001036 HC STATISTIC TOTAL PROTEIN: Performed by: INTERNAL MEDICINE

## 2021-07-06 PROCEDURE — 80048 BASIC METABOLIC PNL TOTAL CA: CPT | Performed by: INTERNAL MEDICINE

## 2021-07-06 PROCEDURE — 85027 COMPLETE CBC AUTOMATED: CPT | Performed by: INTERNAL MEDICINE

## 2021-07-06 PROCEDURE — 250N000011 HC RX IP 250 OP 636: Performed by: INTERNAL MEDICINE

## 2021-07-06 PROCEDURE — 84165 PROTEIN E-PHORESIS SERUM: CPT | Mod: 26 | Performed by: PATHOLOGY

## 2021-07-06 PROCEDURE — 36591 DRAW BLOOD OFF VENOUS DEVICE: CPT

## 2021-07-06 RX ORDER — HEPARIN SODIUM (PORCINE) LOCK FLUSH IV SOLN 100 UNIT/ML 100 UNIT/ML
500 SOLUTION INTRAVENOUS EVERY 8 HOURS
Status: CANCELLED
Start: 2021-07-06

## 2021-07-06 RX ORDER — HEPARIN SODIUM (PORCINE) LOCK FLUSH IV SOLN 100 UNIT/ML 100 UNIT/ML
500 SOLUTION INTRAVENOUS EVERY 8 HOURS
Status: DISCONTINUED | OUTPATIENT
Start: 2021-07-06 | End: 2021-07-06 | Stop reason: HOSPADM

## 2021-07-06 RX ADMIN — Medication 500 UNITS: at 13:28

## 2021-07-06 NOTE — PROGRESS NOTES
Nursing Note:  Amira Arreola presents today for Port labs.    Patient seen by provider today: No   present during visit today: Not Applicable.    Note: Arrived via wheelchair accompanied by her daughter.  Port access without difficulty.    Intravenous Access:  Implanted Port.    Discharge Plan:   Patient was sent to home with her daughter via wheelchair in stable condition.    Libby Nguyễn RN

## 2021-07-07 ENCOUNTER — VIRTUAL VISIT (OUTPATIENT)
Dept: ONCOLOGY | Facility: CLINIC | Age: 86
End: 2021-07-07
Attending: INTERNAL MEDICINE
Payer: MEDICARE

## 2021-07-07 DIAGNOSIS — C90.00 MULTIPLE MYELOMA NOT HAVING ACHIEVED REMISSION (H): Primary | ICD-10-CM

## 2021-07-07 LAB
KAPPA LC UR-MCNC: 13.57 MG/DL (ref 0.33–1.94)
KAPPA LC/LAMBDA SER: 30.16 {RATIO} (ref 0.26–1.65)
LAMBDA LC SERPL-MCNC: 0.45 MG/DL (ref 0.57–2.63)

## 2021-07-07 PROCEDURE — 99441 PR PHYSICIAN TELEPHONE EVALUATION 5-10 MIN: CPT | Mod: 95 | Performed by: INTERNAL MEDICINE

## 2021-07-07 RX ORDER — DEXAMETHASONE 4 MG/1
20 TABLET ORAL
Qty: 20 TABLET | Refills: 4 | Status: SHIPPED | OUTPATIENT
Start: 2021-07-07 | End: 2022-01-01

## 2021-07-07 NOTE — LETTER
7/7/2021         RE: Amira Arreola  [address]  [place] [address]        Dear Colleague,    Thank you for referring your patient, Amira Arreola, to the [facility] [place]. Please see a copy of my visit note below.    Amira is a 88 year old who is being evaluated via a billable telephone visit.      What phone number would you like to be contacted at? [phone]  How would you like to obtain your AVS? 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     1. On 09/21/2015, WBC of 4.2, hemoglobin of 13.2 and platelets of 138.    -On 09/29/2015, SPEP does not reveal any M-spike.   -On 10/02/2015, JANET does not reveal any monoclonal protein.     -On 10/22/2015, urine immunofixation reveals monoclonal free kappa light chain.    2. On 05/11/2016, kappa light chain of 50, lambda light chain of 0.32 and ratio of kappa to lambda of 156.2.  3. Bone marrow biopsy on 05/25/2016 reveals 40-50% kappa light chain restricted plasma cells.  Cytogenetics is normal. FISH panel reveals translocation 11;14.    4. MRI of bones on 06/21/2016 and 06/22/2016 reveals myeloma lesions.  5. On 08/24/2016, she was started on revlimid with dexamethasone 20 mg weekly. She did not have any significant response to treatment.   6. Velcade and dexamethasone started on 03/21/2017.    7. Daratumumab added to velcade and dexamethasone on 05/31/2017.   -Velcade given every 14 days starting 08/01/2018.  -Treatment on hold. Last Velcade was on 06/05/2019.  Last daratumumab was on 05/22/2019. Dexamethasone continued.  8. On 05/22/2019, kappa free light chain of 1.21.     SUBJECTIVE:  Ms. Arreola is an 88-year-old female with kappa free light chain multiple myeloma.  She is currently on weekly dexamethasone. Because of her age and other medical problems, she is not on any other treatment at this time.      I had a  telephone visit with her.  Daughter was also on the phone.  The patient's overall condition is slowly deteriorating.  She has been getting weaker.  At times, she has some confusion.  She has chronic back pain.  This is controlled with Tylenol and oxycodone.      No headache.  No shortness of breath.  No nausea or vomiting.  Appetite is fair.  No recent infection.      OBJECTIVE:  She was alert.  On the phone, she was slightly confused.   Rest of the systems not examined.     LABORATORY:  CBC, BMP, SPEP, and free light chain reviewed.     ASSESSMENT:  1.  An 88-year-old female with kappa free light change multiple myeloma, which is slowly progressing.   2.  Worsening weakness.  3.  Chronic pain, controlled.  4.  Episodes of confusion.     PLAN:  1.  Labs were all reviewed.  The patient has mild progression of her myeloma.     I discussed regarding myeloma.  She is on weekly dexamethasone 20 mg, which she will continue.  She is tolerating it well.     At this time, we will simply monitor her.  Family would like labs to be monitored.  In 3 months' time, we will get CBC, BMP, SPEP, and free light chain.     Given her age and slowly deteriorating condition, unlikely that she will be a candidate for any other treatment if there is significant progression.      2.  Daughter had a few questions which were all answered.  I will see the patient in 3 months' time with labs.    Telephone visit time of 7 minutes. Another 5 minutes spent in review of chart/investigations today and documentation.    This office note has been dictated.          Again, thank you for allowing me to participate in the care of your patient.        Sincerely,        Shayne Roberts MD

## 2021-07-07 NOTE — PROGRESS NOTES
Amira is a 88 year old who is being evaluated via a billable telephone visit.      What phone number would you like to be contacted at? [phone]  How would you like to obtain your AVS? [employer]  Phone call duration: 5 minutes

## 2021-07-07 NOTE — LETTER
7/7/2021         RE: Amira Arreola  [address]  [place] [address]        Dear Colleague,    Thank you for referring your patient, Amira Arreola, to the [facility] [place]. Please see a copy of my visit note below.    Amira is a 88 year old who is being evaluated via a billable telephone visit.      What phone number would you like to be contacted at? [phone]  How would you like to obtain your AVS? 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     1. On 09/21/2015, WBC of 4.2, hemoglobin of 13.2 and platelets of 138.    -On 09/29/2015, SPEP does not reveal any M-spike.   -On 10/02/2015, JANET does not reveal any monoclonal protein.     -On 10/22/2015, urine immunofixation reveals monoclonal free kappa light chain.    2. On 05/11/2016, kappa light chain of 50, lambda light chain of 0.32 and ratio of kappa to lambda of 156.2.  3. Bone marrow biopsy on 05/25/2016 reveals 40-50% kappa light chain restricted plasma cells.  Cytogenetics is normal. FISH panel reveals translocation 11;14.    4. MRI of bones on 06/21/2016 and 06/22/2016 reveals myeloma lesions.  5. On 08/24/2016, she was started on revlimid with dexamethasone 20 mg weekly. She did not have any significant response to treatment.   6. Velcade and dexamethasone started on 03/21/2017.    7. Daratumumab added to velcade and dexamethasone on 05/31/2017.   -Velcade given every 14 days starting 08/01/2018.  -Treatment on hold. Last Velcade was on 06/05/2019.  Last daratumumab was on 05/22/2019. Dexamethasone continued.  8. On 05/22/2019, kappa free light chain of 1.21.     SUBJECTIVE:  Ms. Arreola is an 88-year-old female with kappa free light chain multiple myeloma.  She is currently on weekly dexamethasone. Because of her age and other medical problems, she is not on any other treatment at this time.      I had a  telephone visit with her.  Daughter was also on the phone.  The patient's overall condition is slowly deteriorating.  She has been getting weaker.  At times, she has some confusion.  She has chronic back pain.  This is controlled with Tylenol and oxycodone.      No headache.  No shortness of breath.  No nausea or vomiting.  Appetite is fair.  No recent infection.      OBJECTIVE:  She was alert.  On the phone, she was slightly confused.   Rest of the systems not examined.     LABORATORY:  CBC, BMP, SPEP, and free light chain reviewed.     ASSESSMENT:  1.  An 88-year-old female with kappa free light change multiple myeloma, which is slowly progressing.   2.  Worsening weakness.  3.  Chronic pain, controlled.  4.  Episodes of confusion.     PLAN:  1.  Labs were all reviewed.  The patient has mild progression of her myeloma.     I discussed regarding myeloma.  She is on weekly dexamethasone 20 mg, which she will continue.  She is tolerating it well.     At this time, we will simply monitor her.  Family would like labs to be monitored.  In 3 months' time, we will get CBC, BMP, SPEP, and free light chain.     Given her age and slowly deteriorating condition, unlikely that she will be a candidate for any other treatment if there is significant progression.      2.  Daughter had a few questions which were all answered.  I will see the patient in 3 months' time with labs.    Telephone visit time of 7 minutes. Another 5 minutes spent in review of chart/investigations today and documentation.    This office note has been dictated.          Again, thank you for allowing me to participate in the care of your patient.        Sincerely,        Shayne Roberts MD

## 2021-07-08 LAB
ALBUMIN SERPL ELPH-MCNC: 3.5 G/DL (ref 3.7–5.1)
ALPHA1 GLOB SERPL ELPH-MCNC: 0.3 G/DL (ref 0.2–0.4)
ALPHA2 GLOB SERPL ELPH-MCNC: 0.8 G/DL (ref 0.5–0.9)
B-GLOBULIN SERPL ELPH-MCNC: 0.6 G/DL (ref 0.6–1)
GAMMA GLOB SERPL ELPH-MCNC: 0.4 G/DL (ref 0.7–1.6)
M PROTEIN SERPL ELPH-MCNC: 0 G/DL
PROT PATTERN SERPL ELPH-IMP: ABNORMAL

## 2021-07-12 NOTE — PROGRESS NOTES
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     1. On 09/21/2015, WBC of 4.2, hemoglobin of 13.2 and platelets of 138.    -On 09/29/2015, SPEP does not reveal any M-spike.   -On 10/02/2015, JANET does not reveal any monoclonal protein.     -On 10/22/2015, urine immunofixation reveals monoclonal free kappa light chain.    2. On 05/11/2016, kappa light chain of 50, lambda light chain of 0.32 and ratio of kappa to lambda of 156.2.  3. Bone marrow biopsy on 05/25/2016 reveals 40-50% kappa light chain restricted plasma cells.  Cytogenetics is normal. FISH panel reveals translocation 11;14.    4. MRI of bones on 06/21/2016 and 06/22/2016 reveals myeloma lesions.  5. On 08/24/2016, she was started on revlimid with dexamethasone 20 mg weekly. She did not have any significant response to treatment.   6. Velcade and dexamethasone started on 03/21/2017.    7. Daratumumab added to velcade and dexamethasone on 05/31/2017.   -Velcade given every 14 days starting 08/01/2018.  -Treatment on hold. Last Velcade was on 06/05/2019.  Last daratumumab was on 05/22/2019. Dexamethasone continued.  8. On 05/22/2019, kappa free light chain of 1.21.     SUBJECTIVE:  Ms. Arreola is an 88-year-old female with kappa free light chain multiple myeloma.  She is currently on weekly dexamethasone. Because of her age and other medical problems, she is not on any other treatment at this time.      I had a telephone visit with her.  Daughter was also on the phone.  The patient's overall condition is slowly deteriorating.  She has been getting weaker.  At times, she has some confusion.  She has chronic back pain.  This is controlled with Tylenol and oxycodone.      No headache.  No shortness of breath.  No nausea or vomiting.  Appetite is fair.  No recent infection.      OBJECTIVE:  She was alert.  On the phone, she was slightly confused.   Rest of the systems not examined.     LABORATORY:  CBC,  BMP, SPEP, and free light chain reviewed.     ASSESSMENT:  1.  An 88-year-old female with kappa free light change multiple myeloma, which is slowly progressing.   2.  Worsening weakness.  3.  Chronic pain, controlled.  4.  Episodes of confusion.     PLAN:  1.  Labs were all reviewed.  The patient has mild progression of her myeloma.     I discussed regarding myeloma.  She is on weekly dexamethasone 20 mg, which she will continue.  She is tolerating it well.     At this time, we will simply monitor her.  Family would like labs to be monitored.  In 3 months' time, we will get CBC, BMP, SPEP, and free light chain.     Given her age and slowly deteriorating condition, unlikely that she will be a candidate for any other treatment if there is significant progression.      2.  Daughter had a few questions which were all answered.  I will see the patient in 3 months' time with labs.    Telephone visit time of 7 minutes. Another 5 minutes spent in review of chart/investigations today and documentation.

## 2021-07-13 ENCOUNTER — ANTICOAGULATION THERAPY VISIT (OUTPATIENT)
Dept: FAMILY MEDICINE | Facility: CLINIC | Age: 86
End: 2021-07-13

## 2021-07-13 DIAGNOSIS — Z79.01 LONG TERM CURRENT USE OF ANTICOAGULANT THERAPY: Primary | ICD-10-CM

## 2021-07-13 DIAGNOSIS — I48.0 PAROXYSMAL ATRIAL FIBRILLATION (H): ICD-10-CM

## 2021-07-13 LAB — INR PPP: 2.3

## 2021-07-13 NOTE — PROGRESS NOTES
ANTICOAGULATION MANAGEMENT     Amira Arreola 88 year old female is on warfarin with therapeutic INR result. (Goal INR 2.0-3.0)    Recent labs: (last 7 days)     07/13/21  0000   INR 2.3       ASSESSMENT     Source(s): Home Care/Facility Nurse       Warfarin doses taken: Warfarin taken as instructed    Diet: No new diet changes identified    New illness, injury, or hospitalization: No    Medication/supplement changes: None noted    Signs or symptoms of bleeding or clotting: No    Previous INR: Therapeutic last 2(+) visits    Additional findings: None     PLAN     Recommended plan for no diet, medication or health factor changes affecting INR     Dosing Instructions: Continue your current warfarin dose with next INR in 3 weeks       Summary  As of 7/13/2021    Full warfarin instructions:  2 mg every Mon, Wed, Fri; 4 mg all other days   Next INR check:  8/3/2021             Telephone call with home care nurse Gerardo who verbalizes understanding and agrees to plan    Orders given to  Homecare nurse/facility to recheck    Education provided: Target INR goal and significance of current INR result    Plan made per [facility] anticoagulation protocol    Ernst Carlson RN  Anticoagulation Clinic  7/13/2021    _______________________________________________________________________     Anticoagulation Episode Summary     Current INR goal:  2.0-3.0   TTR:  66.3 % (1 y)   Target end date:  Indefinite   Send INR reminders to:  DANTE SUMNER    Indications    Long term current use of anticoagulant therapy [Z79.01]  Atrial fibrillation (H) [I48.91] (Resolved) [I48.91]  Paroxysmal atrial fibrillation (H) [I48.0]           Comments:   Rajiv HENSLEY [facility] [phone] private pay nursing visits to check INR         Anticoagulation Care Providers     Provider Role Specialty Phone number    Addy Frias MD Referring Internal Medicine [phone]

## 2021-07-14 ENCOUNTER — TELEPHONE (OUTPATIENT)
Dept: FAMILY MEDICINE | Facility: CLINIC | Age: 86
End: 2021-07-14

## 2021-07-14 NOTE — TELEPHONE ENCOUNTER
SHELLIE Carrillo called requesting approval to change re certification visit for SN orders from 08/03/2021 to 08/05/2021.    Verbal approval given.

## 2021-08-05 ENCOUNTER — TELEPHONE (OUTPATIENT)
Dept: FAMILY MEDICINE | Facility: CLINIC | Age: 86
End: 2021-08-05

## 2021-08-05 ENCOUNTER — ANCILLARY PROCEDURE (OUTPATIENT)
Dept: CARDIOLOGY | Facility: CLINIC | Age: 86
End: 2021-08-05
Attending: INTERNAL MEDICINE
Payer: MEDICARE

## 2021-08-05 DIAGNOSIS — Z79.01 LONG TERM CURRENT USE OF ANTICOAGULANT THERAPY: Primary | ICD-10-CM

## 2021-08-05 DIAGNOSIS — Z95.0 CARDIAC PACEMAKER IN SITU: ICD-10-CM

## 2021-08-05 DIAGNOSIS — I48.0 PAROXYSMAL ATRIAL FIBRILLATION (H): ICD-10-CM

## 2021-08-05 LAB — INR (EXTERNAL): 2.3 (ref 0.9–1.1)

## 2021-08-05 PROCEDURE — 93294 REM INTERROG EVL PM/LDLS PM: CPT | Performed by: INTERNAL MEDICINE

## 2021-08-05 PROCEDURE — 93296 REM INTERROG EVL PM/IDS: CPT | Performed by: INTERNAL MEDICINE

## 2021-08-05 NOTE — TELEPHONE ENCOUNTER
Gerardo---[facility] called to report today's INR:     INR today: 2.3    Please return call to Gerardo:   [phone]  May leave detailed msg/orders on voice mail.       Bri GONCALVES, RN      August 5, 2021  11:16 AM

## 2021-08-05 NOTE — TELEPHONE ENCOUNTER
ANTICOAGULATION MANAGEMENT     Amira Arreola 89 year old female is on warfarin with therapeutic INR result. (Goal INR )    No results for input(s): INR in the last 168 hours.    ASSESSMENT     Source(s): Chart Review and Home Care/Facility Nurse       Warfarin doses taken: Warfarin taken as instructed    Diet: No new diet changes identified    New illness, injury, or hospitalization: No    Medication/supplement changes: None noted    Signs or symptoms of bleeding or clotting: No    Previous INR: Therapeutic last 2(+) visits    Additional findings: None     PLAN     Recommended plan for no diet, medication or health factor changes affecting INR     Dosing Instructions: Continue your current warfarin dose with next INR in 4 weeks           Telephone call with home care nurse Gerardo who agrees to plan and repeated back plan correctly    Orders given to  Homecare nurse/facility to recheck    Education provided: None required    Plan made per [facility] anticoagulation protocol    Suyapa Walton RN  Anticoagulation Clinic  8/5/2021    _______________________________________________________________________     Anticoagulation Episode Summary     Current INR goal:  2.0-3.0   TTR:  67.1 % (11.4 mo)   Target end date:  Indefinite   Send INR reminders to:  DANTE SUMNER    Indications    Long term current use of anticoagulant therapy [Z79.01]  Atrial fibrillation (H) [I48.91] (Resolved) [I48.91]  Paroxysmal atrial fibrillation (H) [I48.0]           Comments:   Rajiv HENSLEY [facility] [phone] private pay nursing visits to check INR         Anticoagulation Care Providers     Provider Role Specialty Phone number    Addy Frias MD Referring Internal Medicine [phone]

## 2021-08-09 ENCOUNTER — TELEPHONE (OUTPATIENT)
Dept: FAMILY MEDICINE | Facility: CLINIC | Age: 86
End: 2021-08-09

## 2021-08-09 NOTE — TELEPHONE ENCOUNTER
Spoke with [facility]     Gave verbal okay for skilled nursing continuation of care visits for INRs     He will advise pt to schedule to schedule visit with PCP soon as last OV was April 2021    Aruna ROBERSON RN

## 2021-08-24 ENCOUNTER — LAB (OUTPATIENT)
Dept: LAB | Facility: CLINIC | Age: 86
End: 2021-08-24
Attending: INTERNAL MEDICINE
Payer: MEDICARE

## 2021-08-24 ENCOUNTER — OFFICE VISIT (OUTPATIENT)
Dept: CARDIOLOGY | Facility: CLINIC | Age: 86
End: 2021-08-24
Attending: INTERNAL MEDICINE
Payer: MEDICARE

## 2021-08-24 VITALS
WEIGHT: 145 LBS | DIASTOLIC BLOOD PRESSURE: 68 MMHG | BODY MASS INDEX: 24.16 KG/M2 | HEIGHT: 65 IN | SYSTOLIC BLOOD PRESSURE: 104 MMHG | HEART RATE: 70 BPM | OXYGEN SATURATION: 97 %

## 2021-08-24 DIAGNOSIS — I27.20 MODERATE TO SEVERE PULMONARY HYPERTENSION (H): ICD-10-CM

## 2021-08-24 DIAGNOSIS — I50.32 CHRONIC DIASTOLIC HEART FAILURE (H): ICD-10-CM

## 2021-08-24 DIAGNOSIS — I48.0 PAROXYSMAL ATRIAL FIBRILLATION (H): ICD-10-CM

## 2021-08-24 DIAGNOSIS — I07.1 TRICUSPID VALVE INSUFFICIENCY, UNSPECIFIED ETIOLOGY: ICD-10-CM

## 2021-08-24 DIAGNOSIS — I34.0 MITRAL VALVE INSUFFICIENCY, UNSPECIFIED ETIOLOGY: ICD-10-CM

## 2021-08-24 DIAGNOSIS — I50.32 CHRONIC DIASTOLIC HEART FAILURE (H): Primary | ICD-10-CM

## 2021-08-24 LAB
ANION GAP SERPL CALCULATED.3IONS-SCNC: 4 MMOL/L (ref 3–14)
BUN SERPL-MCNC: 16 MG/DL (ref 7–30)
CALCIUM SERPL-MCNC: 9.4 MG/DL (ref 8.5–10.1)
CHLORIDE BLD-SCNC: 112 MMOL/L (ref 94–109)
CO2 SERPL-SCNC: 24 MMOL/L (ref 20–32)
CREAT SERPL-MCNC: 0.93 MG/DL (ref 0.52–1.04)
GFR SERPL CREATININE-BSD FRML MDRD: 55 ML/MIN/1.73M2
GLUCOSE BLD-MCNC: 130 MG/DL (ref 70–99)
MDC_IDC_EPISODE_DTM: NORMAL
MDC_IDC_EPISODE_DTM: NORMAL
MDC_IDC_EPISODE_ID: NORMAL
MDC_IDC_EPISODE_ID: NORMAL
MDC_IDC_EPISODE_TYPE: NORMAL
MDC_IDC_EPISODE_TYPE: NORMAL
MDC_IDC_LEAD_IMPLANT_DT: NORMAL
MDC_IDC_LEAD_LOCATION: NORMAL
MDC_IDC_LEAD_LOCATION_DETAIL_1: NORMAL
MDC_IDC_LEAD_MFG: NORMAL
MDC_IDC_LEAD_MODEL: NORMAL
MDC_IDC_LEAD_POLARITY_TYPE: NORMAL
MDC_IDC_LEAD_SERIAL: NORMAL
MDC_IDC_MSMT_BATTERY_DTM: NORMAL
MDC_IDC_MSMT_BATTERY_REMAINING_LONGEVITY: 96 MO
MDC_IDC_MSMT_BATTERY_REMAINING_PERCENTAGE: 100 %
MDC_IDC_MSMT_BATTERY_STATUS: NORMAL
MDC_IDC_MSMT_LEADCHNL_RV_IMPEDANCE_VALUE: 793 OHM
MDC_IDC_MSMT_LEADCHNL_RV_PACING_THRESHOLD_AMPLITUDE: 0.8 V
MDC_IDC_MSMT_LEADCHNL_RV_PACING_THRESHOLD_PULSEWIDTH: 0.4 MS
MDC_IDC_PG_IMPLANT_DTM: NORMAL
MDC_IDC_PG_MFG: NORMAL
MDC_IDC_PG_MODEL: NORMAL
MDC_IDC_PG_SERIAL: NORMAL
MDC_IDC_PG_TYPE: NORMAL
MDC_IDC_SESS_CLINIC_NAME: NORMAL
MDC_IDC_SESS_DTM: NORMAL
MDC_IDC_SESS_TYPE: NORMAL
MDC_IDC_SET_BRADY_LOWRATE: 70 {BEATS}/MIN
MDC_IDC_SET_BRADY_MAX_SENSOR_RATE: 130 {BEATS}/MIN
MDC_IDC_SET_BRADY_MODE: NORMAL
MDC_IDC_SET_LEADCHNL_RV_PACING_AMPLITUDE: 1.2 V
MDC_IDC_SET_LEADCHNL_RV_PACING_CAPTURE_MODE: NORMAL
MDC_IDC_SET_LEADCHNL_RV_PACING_POLARITY: NORMAL
MDC_IDC_SET_LEADCHNL_RV_PACING_PULSEWIDTH: 0.4 MS
MDC_IDC_SET_LEADCHNL_RV_SENSING_ADAPTATION_MODE: NORMAL
MDC_IDC_SET_LEADCHNL_RV_SENSING_POLARITY: NORMAL
MDC_IDC_SET_LEADCHNL_RV_SENSING_SENSITIVITY: 2.5 MV
MDC_IDC_SET_ZONE_DETECTION_INTERVAL: 375 MS
MDC_IDC_SET_ZONE_TYPE: NORMAL
MDC_IDC_SET_ZONE_VENDOR_TYPE: NORMAL
MDC_IDC_STAT_BRADY_DTM_END: NORMAL
MDC_IDC_STAT_BRADY_DTM_START: NORMAL
MDC_IDC_STAT_BRADY_RV_PERCENT_PACED: 99 %
MDC_IDC_STAT_EPISODE_RECENT_COUNT: 0
MDC_IDC_STAT_EPISODE_RECENT_COUNT_DTM_END: NORMAL
MDC_IDC_STAT_EPISODE_RECENT_COUNT_DTM_START: NORMAL
MDC_IDC_STAT_EPISODE_TYPE: NORMAL
MDC_IDC_STAT_EPISODE_VENDOR_TYPE: NORMAL
MDC_IDC_STAT_EPISODE_VENDOR_TYPE: NORMAL
POTASSIUM BLD-SCNC: 3.7 MMOL/L (ref 3.4–5.3)
SODIUM SERPL-SCNC: 140 MMOL/L (ref 133–144)

## 2021-08-24 PROCEDURE — 99215 OFFICE O/P EST HI 40 MIN: CPT | Performed by: NURSE PRACTITIONER

## 2021-08-24 PROCEDURE — 36415 COLL VENOUS BLD VENIPUNCTURE: CPT | Performed by: INTERNAL MEDICINE

## 2021-08-24 PROCEDURE — 80048 BASIC METABOLIC PNL TOTAL CA: CPT | Performed by: INTERNAL MEDICINE

## 2021-08-24 ASSESSMENT — MIFFLIN-ST. JEOR: SCORE: 1083.6

## 2021-08-24 NOTE — PROGRESS NOTES
Cardiology Clinic Progress Note  Amira Arreola MRN# [id number]   YOB: 1932 Age: 89 year old   Primary Cardiologist: Dr. Rivera Reason for visit: CORE follow up             Assessment and Plan:   Amira Arreola is a very pleasant 89year old female with a history of HFpEF, chronic atrial fibrillation, hypertension, mitral regurgitation, tricuspid regurgitation and hx of multiple myeloma (Follows with Dr. Roberts).     1.  Chronic diastolic heart failure/HFpEF - Echocardiogram completed 3/23/19 LVEF 55-60%, no wall motion abnormalities, moderate mitral regurgitation, moderate tricuspid regurgitation, and severe pulmonary hypertension.               - NYHA class III, stage C              - Dry weight : ~ 140#              - Diuretic regimen : continue furosemide to 60mg daily, additional 20mg for weight gain >2# overnight or increased edema.               - Continue potassium to 20meq BID and additional 20meq on days when she takes an extra furosemide.               - Aldosterone antagonist : none  2. Chronic atrial fibrillation - s/p AV solomon ablation with PPM implantation 7/5/19              - [id number] score 5 (HTN, HF, age, female)              - Continue warfarin for thromboembolic prophylaxis.   3. Moderate mitral regurgitation, moderate tricuspid regurgitation               - Will consider repeat echocardiogram in the future if worsening symptoms, currently won't change any management clinically.  Last echo 3/2019.  4. Severe pulmonary hypertension  5. Multiple myeloma with mets to bone - follows with Dr. Roberts    I saw Amira and her daughter today for CORE follow up. She is doing well from a heart failure standpoint. Clinic weight today 145#/home weight 143# and she appears close to euvolemic. Plan to continue furosemide 60mg daily with additional 20mg PRN. In the past she has been resistant to adjustments in her diuretics due to increased urination, stating it affects her quality of life. We  have compromised allowing her to be slightly volume up to allow her some flexibility with her diuretics and not increase the dosage. Of not her  recently passed away, she is still at home with care giver support most days and her son lives there. No medication changes today.       Changes today: none    Follow up plan:     CORE follow up with me in 4 months with labs prior        History of Presenting Illness:    Amira Arreola is a very pleasant 89 year old female with a history of HFpEF, chronic atrial fibrillation, hypertension, mitral regurgitation, tricuspid regurgitation and hx of multiple myeloma (Follows with Dr. Roberts).      Amira had multiple hospitalizations last summer (2019) for atrial fibrillation and decompensated diastolic HF, ultimately undergoing an AV solomon ablation with PPM 7/5/19. Echocardiogram completed 3/23/19 LVEF 55-60%, no wall motion abnormalities, moderate mitral regurgitation, moderate tricuspid regurgitation, and severe pulmonary hypertension.      Patient has been following closely in CORE clinic. She saw Dr. Rivera in April 2021 at which time no medications were changed. Weight was stable ~ 150#.      She saw Dr. Roberts the end of January for her slowly progressing multiple myeloma at which time it was recommended to continue dexamethasone 20mg and noted would not start any other treatment unless there was significant progression of disease or evidence of end organ damage.     Patient is here today for CORE follow up.     Patient reports feeling good. Monitoring weights daily a home. Weight was 143# today. Clinic weight stable today 145#. No recent additional PRN doses of furosemide. Has noted some improvement in lower extremity. Denies shortness of breath at rest. Denies orthopnea or PND. Activity is limited, no exertional dyspnea but likely due to limited activity. Denies chest pain or chest tightness. Denies dizziness, lightheadedness or other presyncopal symptoms. Denies  tachycardia or palpitations. Denies any bleeding issues. Denies falls.     Labs today show stable renal function and electrolytes. Blood pressure 104/68 and HR 70 in clinic today.    Appetite good. Enjoys sandwiches, cheese. Eating some fresh fruits daily. Drinking 2-3 diet cokes, 1 cup coffee, 1 cup milk, 1-2 glasses water. No set exercise routine. Denies tobacco or alcohol use. Has a home health care aide 5 days per week. Her  recently passed away.         Social History    , 6 children. Retired nurse.  Social History     Socioeconomic History     Marital status:      Spouse name: Tom     Number of children: 6     Years of education: Not on file     Highest education level: Not on file   Occupational History     Occupation: rn anesthetist     Employer: RETIRED   Tobacco Use     Smoking status: Never Smoker     Smokeless tobacco: Never Used   Substance and Sexual Activity     Alcohol use: No     Alcohol/week: 0.0 standard drinks     Drug use: No     Sexual activity: Never   Other Topics Concern     Parent/sibling w/ CABG, MI or angioplasty before 65F 55M? Not Asked   Social History Narrative     Not on file     Social Determinants of Health     Financial Resource Strain:      Difficulty of Paying Living Expenses:    Food Insecurity:      Worried About Running Out of Food in the Last Year:      Ran Out of Food in the Last Year:    Transportation Needs:      Lack of Transportation (Medical):      Lack of Transportation (Non-Medical):    Physical Activity:      Days of Exercise per Week:      Minutes of Exercise per Session:    Stress:      Feeling of Stress :    Social Connections:      Frequency of Communication with Friends and Family:      Frequency of Social Gatherings with Friends and Family:      Attends Gnosticism Services:      Active Member of Clubs or Organizations:      Attends Club or Organization Meetings:      Marital Status:    Intimate Partner Violence:      Fear of Current or  "Ex-Partner:      Emotionally Abused:      Physically Abused:      Sexually Abused:             Review of Systems:   Skin:  Negative bruising   Eyes:  Negative glaucoma  ENT:  Negative    Respiratory:  Negative    Cardiovascular:  Negative;palpitations;chest pain;syncope or near-syncope;cyanosis Positive for;edema;fatigue  Gastroenterology: Negative constipation  Genitourinary:  Positive for urinary frequency  Musculoskeletal:  Positive for back pain  Neurologic:  Negative local weakness;incoordination;numbness or tingling of feet  Psychiatric:  Negative sleep disturbances  Heme/Lymph/Imm:  Positive for allergies  Endocrine:  Negative           Physical Exam:   Vitals: /68   Pulse 70   Ht 1.651 m (5' 5\")   Wt 65.8 kg (145 lb)   SpO2 97%   BMI 24.13 kg/m     Wt Readings from Last 4 Encounters:   08/24/21 65.8 kg (145 lb)   04/26/21 68.2 kg (150 lb 6.4 oz)   04/13/21 68.5 kg (151 lb)   01/14/21 67.7 kg (149 lb 4.8 oz)     GEN: well nourished, in no acute distress.  HEENT:  Pupils equal, round. Sclerae nonicteric.   NECK: Supple, no masses appreciated.   C/V:  Regular rate and rhythm, systolic murmur  RESP: Respirations are unlabored. Clear to auscultation bilaterally without wheezing, rales, or rhonchi.  GI: Abdomen soft, nontender.  EXTREM: Trace LE edema.  NEURO: Alert and oriented, cooperative.  SKIN: Warm and dry.        Data:     LIPID RESULTS:  Lab Results   Component Value Date    CHOL 160 10/12/2016    HDL 76 10/12/2016    LDL 71 10/12/2016    TRIG 66 10/12/2016    CHOLHDLRATIO 2.3 09/21/2015     LIVER ENZYME RESULTS:  Lab Results   Component Value Date    AST 20 01/06/2021    ALT 19 01/06/2021     CBC RESULTS:  Lab Results   Component Value Date    WBC 7.3 07/06/2021    RBC 4.28 07/06/2021    HGB 10.6 (L) 07/06/2021    HCT 34.7 (L) 07/06/2021    MCV 81 07/06/2021    MCH 24.8 (L) 07/06/2021    MCHC 30.5 (L) 07/06/2021    RDW 18.2 (H) 07/06/2021     07/06/2021     BMP RESULTS:  Lab Results "   Component Value Date     08/24/2021     07/06/2021    POTASSIUM 3.7 08/24/2021    POTASSIUM 3.8 07/06/2021    CHLORIDE 112 (H) 08/24/2021    CHLORIDE 112 (H) 07/06/2021    CO2 24 08/24/2021    CO2 27 07/06/2021    ANIONGAP 4 08/24/2021    ANIONGAP 3 07/06/2021     (H) 08/24/2021     (H) 07/06/2021    BUN 16 08/24/2021    BUN 17 07/06/2021    CR 0.93 08/24/2021    CR 0.97 07/06/2021    GFRESTIMATED 55 (L) 08/24/2021    GFRESTIMATED 52 (L) 07/06/2021    GFRESTBLACK 60 (L) 07/06/2021    BRADLEY 9.4 08/24/2021    BRADLEY 9.3 07/06/2021      A1C RESULTS:  No results found for: A1C  INR RESULTS:  Lab Results   Component Value Date    INR 2.3 (A) 08/05/2021    INR 2.3 07/13/2021    INR 2.6 (A) 06/22/2021            Medications     Current Outpatient Medications   Medication Sig Dispense Refill     acetaminophen (TYLENOL) 500 MG tablet Take 500 mg by mouth every 6 hours as needed for mild pain        Carboxymethylcellulose Sod PF (REFRESH PLUS) 0.5 % SOLN ophthalmic solution 1 drop 4 times daily as needed for dry eyes       dexamethasone (DECADRON) 4 MG tablet Take 5 tablets (20 mg) by mouth every 7 days 20 tablet 4     furosemide (LASIX) 20 MG tablet Take 3 (60mg) tablets daily, if weight > 137 pounds take an additional 20mg (1 tablet) 270 tablet 3     latanoprost (XALATAN) 0.005 % ophthalmic solution Place 1 drop into the right eye At Bedtime       lidocaine-prilocaine (EMLA) cream Apply to port site 1 hour prior to access 30 g 1     Multiple Vitamins-Minerals (DAILY MULTIVITAMIN) CAPS Take 1 tablet by mouth daily        nystatin (MYCOSTATIN) 650850 UNIT/GM external cream Apply topically 2 times daily 30 g 0     order for DME Equipment being ordered: Nebulizer with tubes/mask/acessories, use as directed 1 Device 0     oxyCODONE (ROXICODONE) 5 MG tablet Take half a pill every 12 hours as needed for pain. 30 tablet 0     polyethylene glycol (MIRALAX/GLYCOLAX) powder Take 17 g by mouth daily as needed for  constipation        potassium chloride ER (KLOR-CON M) 20 MEQ CR tablet Take 1 tablet 2 x a day, with an additional 1 tablet on days when you take extra fursoemide 270 tablet 0     SIMBRINZA 1-0.2 % ophthalmic suspension Place 1 drop into the right eye 2 times daily 1 drop AM and PM  2     Sodium Fluoride (SF 5000 PLUS) 1.1 % CREA Apply to affected area 3 times daily       triamcinolone (KENALOG) 0.1 % external cream Apply topically 2 times daily 15 g 1     warfarin ANTICOAGULANT (COUMADIN) 4 MG tablet Take 1/2 tablet on Mon/Wed/Fri and 1 tablet all other days As directed. 90 tablet 1          Past Medical History     Past Medical History:   Diagnosis Date     Abnormal CXR 2018    then ct done and not significant     Acute diastolic heart failure (H) 03/22/2019    nl ef, 2+mr and tr with severe pulm htn     Ascending aorta dilatation (H) 04/2016    on echo, mild, fu 7/18 4.0, slightly larger     Cancer, metastatic to bone (H)     due to myeloma     Colonic polyp 2008    adenomatous, fu 2013 tics only     Compression fracture 2016    multiple areas of spine     Dry eyes      Elevated MCV 2015    b12 and folic acid nl     HTN (hypertension) 2000    off meds for years     Lung nodule 08/2018    on ct, 4mm, ct done for fu abnl cxr     Menorrhagia 2002    hysteroscopy and d and c done     MGUS (monoclonal gammopathy of unknown significance) 2015    eval by Dr. Roberts     Moderate to severe pulmonary hypertension (H) 03/2019    on echo     Multiple myeloma (H) 2016    dx 5/16 at [place], bone lesions seen on mri 6/16     OAB (overactive bladder) 2013    Dr. Grullon     Osteoporosis     fu done 2010 and stable, went off meds then, fu done 2013; has had gyn fu and added evista 2013 by gyn     Palpitations 4/16    nl echo, mildly dilated asc aorta     Paroxysmal atrial fibrillation (H) 04/2016    had palp and ziopatch showed it, echo nl lv fxn, mild mr and tr, added coum and toprol, toprol dose raised 12/22/16; hosp 2019 for  this 3x, then had av solomon ablation and ppm      Sciatica of left side     Dr. Helen Ricardo      SVT (supraventricular tachycardia) (H)     on ziopatch     Thrombocytopenia (H)      Past Surgical History:   Procedure Laterality Date     BONE MARROW BIOPSY, BONE SPECIMEN, NEEDLE/TROCAR N/A 2016    Procedure: BIOPSY BONE MARROW;  Surgeon: Bryan Patel MD;  Location:  GI     CATARACT IOL, RT/LT        SECTION  ,      COLONOSCOPY  2013    Procedure: COLONOSCOPY;  COLONOSCOPY;  Surgeon: Steffany Rockwell MD;  Location:  GI     EP ABLATION AV NODE N/A 2019    Procedure: EP Ablation AV Node;  Surgeon: Lee Menchaac MD;  Location:  HEART CARDIAC CATH LAB     EP PACEMAKER N/A 2019    Procedure: EP Pacemaker;  Surgeon: Lee Menchaca MD;  Location:  HEART CARDIAC CATH LAB     EXCISE EXOSTOSIS TIBIA / FIBULA  2014    Procedure: EXCISE EXOSTOSIS TIBIA / FIBULA;  Surgeon: Naila Pichardo MD;  Location:  SD     hysteroscopy and d and c      due to bleeding     left anle replacement       right ankle surgery       Family History   Problem Relation Age of Onset     Heart Disease Father      C.A.D. Mother      Cerebrovascular Disease Brother      Family History Negative Sister      Family History Negative Sister      Family History Negative Brother             Allergies   Blood transfusion related (informational only) and Penicillin [penicillins]          [facility] HE[facility] CARE  Pager: [phone]

## 2021-08-24 NOTE — LETTER
8/24/2021    Addy Frias MD  [address]  [facility] [address]    RE: Amira Arreola       Dear Colleague,    I had the pleasure of seeing Amira Arreola in the [facility].    Cardiology Clinic Progress Note  Amira Arreola MRN# [id number]   YOB: 1932 Age: 89 year old   Primary Cardiologist: Dr. Rivera Reason for visit: CORE follow up             Assessment and Plan:   Amira Arreola is a very pleasant 89year old female with a history of HFpEF, chronic atrial fibrillation, hypertension, mitral regurgitation, tricuspid regurgitation and hx of multiple myeloma (Follows with Dr. Roberts).     1.  Chronic diastolic heart failure/HFpEF - Echocardiogram completed 3/23/19 LVEF 55-60%, no wall motion abnormalities, moderate mitral regurgitation, moderate tricuspid regurgitation, and severe pulmonary hypertension.               - NYHA class III, stage C              - Dry weight : ~ 140#              - Diuretic regimen : continue furosemide to 60mg daily, additional 20mg for weight gain >2# overnight or increased edema.               - Continue potassium to 20meq BID and additional 20meq on days when she takes an extra furosemide.               - Aldosterone antagonist : none  2. Chronic atrial fibrillation - s/p AV solomon ablation with PPM implantation 7/5/19              - [id number] score 5 (HTN, HF, age, female)              - Continue warfarin for thromboembolic prophylaxis.   3. Moderate mitral regurgitation, moderate tricuspid regurgitation               - Will consider repeat echocardiogram in the future if worsening symptoms, currently won't change any management clinically.  Last echo 3/2019.  4. Severe pulmonary hypertension  5. Multiple myeloma with mets to bone - follows with Dr. Roberts    I saw Amira and her daughter today for CORE follow up. She is doing well from a heart failure standpoint. Clinic weight today 145#/home  weight 143# and she appears close to euvolemic. Plan to continue furosemide 60mg daily with additional 20mg PRN. In the past she has been resistant to adjustments in her diuretics due to increased urination, stating it affects her quality of life. We have compromised allowing her to be slightly volume up to allow her some flexibility with her diuretics and not increase the dosage. Of not her  recently passed away, she is still at home with care giver support most days and her son lives there. No medication changes today.       Changes today: none    Follow up plan:     CORE follow up with me in 4 months with labs prior        History of Presenting Illness:    Amira Arreola is a very pleasant 89 year old female with a history of HFpEF, chronic atrial fibrillation, hypertension, mitral regurgitation, tricuspid regurgitation and hx of multiple myeloma (Follows with Dr. Roberts).      Amira had multiple hospitalizations last summer (2019) for atrial fibrillation and decompensated diastolic HF, ultimately undergoing an AV solomon ablation with PPM 7/5/19. Echocardiogram completed 3/23/19 LVEF 55-60%, no wall motion abnormalities, moderate mitral regurgitation, moderate tricuspid regurgitation, and severe pulmonary hypertension.      Patient has been following closely in CORE clinic. She saw Dr. Rivera in April 2021 at which time no medications were changed. Weight was stable ~ 150#.      She saw Dr. Roberts the end of January for her slowly progressing multiple myeloma at which time it was recommended to continue dexamethasone 20mg and noted would not start any other treatment unless there was significant progression of disease or evidence of end organ damage.     Patient is here today for CORE follow up.     Patient reports feeling good. Monitoring weights daily a home. Weight was 143# today. Clinic weight stable today 145#. No recent additional PRN doses of furosemide. Has noted some improvement in lower extremity.  Denies shortness of breath at rest. Denies orthopnea or PND. Activity is limited, no exertional dyspnea but likely due to limited activity. Denies chest pain or chest tightness. Denies dizziness, lightheadedness or other presyncopal symptoms. Denies tachycardia or palpitations. Denies any bleeding issues. Denies falls.     Labs today show stable renal function and electrolytes. Blood pressure 104/68 and HR 70 in clinic today.    Appetite good. Enjoys sandwiches, cheese. Eating some fresh fruits daily. Drinking 2-3 diet cokes, 1 cup coffee, 1 cup milk, 1-2 glasses water. No set exercise routine. Denies tobacco or alcohol use. Has a home health care aide 5 days per week. Her  recently passed away.         Social History    , 6 children. Retired nurse.  Social History     Socioeconomic History     Marital status:      Spouse name: Tom     Number of children: 6     Years of education: Not on file     Highest education level: Not on file   Occupational History     Occupation: rn anesthetist     Employer: RETIRED   Tobacco Use     Smoking status: Never Smoker     Smokeless tobacco: Never Used   Substance and Sexual Activity     Alcohol use: No     Alcohol/week: 0.0 standard drinks     Drug use: No     Sexual activity: Never   Other Topics Concern     Parent/sibling w/ CABG, MI or angioplasty before 65F 55M? Not Asked   Social History Narrative     Not on file     Social Determinants of Health     Financial Resource Strain:      Difficulty of Paying Living Expenses:    Food Insecurity:      Worried About Running Out of Food in the Last Year:      Ran Out of Food in the Last Year:    Transportation Needs:      Lack of Transportation (Medical):      Lack of Transportation (Non-Medical):    Physical Activity:      Days of Exercise per Week:      Minutes of Exercise per Session:    Stress:      Feeling of Stress :    Social Connections:      Frequency of Communication with Friends and Family:       "Frequency of Social Gatherings with Friends and Family:      Attends Christianity Services:      Active Member of Clubs or Organizations:      Attends Club or Organization Meetings:      Marital Status:    Intimate Partner Violence:      Fear of Current or Ex-Partner:      Emotionally Abused:      Physically Abused:      Sexually Abused:             Review of Systems:   Skin:  Negative bruising   Eyes:  Negative glaucoma  ENT:  Negative    Respiratory:  Negative    Cardiovascular:  Negative;palpitations;chest pain;syncope or near-syncope;cyanosis Positive for;edema;fatigue  Gastroenterology: Negative constipation  Genitourinary:  Positive for urinary frequency  Musculoskeletal:  Positive for back pain  Neurologic:  Negative local weakness;incoordination;numbness or tingling of feet  Psychiatric:  Negative sleep disturbances  Heme/Lymph/Imm:  Positive for allergies  Endocrine:  Negative           Physical Exam:   Vitals: /68   Pulse 70   Ht 1.651 m (5' 5\")   Wt 65.8 kg (145 lb)   SpO2 97%   BMI 24.13 kg/m     Wt Readings from Last 4 Encounters:   08/24/21 65.8 kg (145 lb)   04/26/21 68.2 kg (150 lb 6.4 oz)   04/13/21 68.5 kg (151 lb)   01/14/21 67.7 kg (149 lb 4.8 oz)     GEN: well nourished, in no acute distress.  HEENT:  Pupils equal, round. Sclerae nonicteric.   NECK: Supple, no masses appreciated.   C/V:  Regular rate and rhythm, systolic murmur  RESP: Respirations are unlabored. Clear to auscultation bilaterally without wheezing, rales, or rhonchi.  GI: Abdomen soft, nontender.  EXTREM: Trace LE edema.  NEURO: Alert and oriented, cooperative.  SKIN: Warm and dry.        Data:     LIPID RESULTS:  Lab Results   Component Value Date    CHOL 160 10/12/2016    HDL 76 10/12/2016    LDL 71 10/12/2016    TRIG 66 10/12/2016    CHOLHDLRATIO 2.3 09/21/2015     LIVER ENZYME RESULTS:  Lab Results   Component Value Date    AST 20 01/06/2021    ALT 19 01/06/2021     CBC RESULTS:  Lab Results   Component Value Date    " WBC 7.3 07/06/2021    RBC 4.28 07/06/2021    HGB 10.6 (L) 07/06/2021    HCT 34.7 (L) 07/06/2021    MCV 81 07/06/2021    MCH 24.8 (L) 07/06/2021    MCHC 30.5 (L) 07/06/2021    RDW 18.2 (H) 07/06/2021     07/06/2021     BMP RESULTS:  Lab Results   Component Value Date     08/24/2021     07/06/2021    POTASSIUM 3.7 08/24/2021    POTASSIUM 3.8 07/06/2021    CHLORIDE 112 (H) 08/24/2021    CHLORIDE 112 (H) 07/06/2021    CO2 24 08/24/2021    CO2 27 07/06/2021    ANIONGAP 4 08/24/2021    ANIONGAP 3 07/06/2021     (H) 08/24/2021     (H) 07/06/2021    BUN 16 08/24/2021    BUN 17 07/06/2021    CR 0.93 08/24/2021    CR 0.97 07/06/2021    GFRESTIMATED 55 (L) 08/24/2021    GFRESTIMATED 52 (L) 07/06/2021    GFRESTBLACK 60 (L) 07/06/2021    BRADLEY 9.4 08/24/2021    BRADLEY 9.3 07/06/2021      A1C RESULTS:  No results found for: A1C  INR RESULTS:  Lab Results   Component Value Date    INR 2.3 (A) 08/05/2021    INR 2.3 07/13/2021    INR 2.6 (A) 06/22/2021            Medications     Current Outpatient Medications   Medication Sig Dispense Refill     acetaminophen (TYLENOL) 500 MG tablet Take 500 mg by mouth every 6 hours as needed for mild pain        Carboxymethylcellulose Sod PF (REFRESH PLUS) 0.5 % SOLN ophthalmic solution 1 drop 4 times daily as needed for dry eyes       dexamethasone (DECADRON) 4 MG tablet Take 5 tablets (20 mg) by mouth every 7 days 20 tablet 4     furosemide (LASIX) 20 MG tablet Take 3 (60mg) tablets daily, if weight > 137 pounds take an additional 20mg (1 tablet) 270 tablet 3     latanoprost (XALATAN) 0.005 % ophthalmic solution Place 1 drop into the right eye At Bedtime       lidocaine-prilocaine (EMLA) cream Apply to port site 1 hour prior to access 30 g 1     Multiple Vitamins-Minerals (DAILY MULTIVITAMIN) CAPS Take 1 tablet by mouth daily        nystatin (MYCOSTATIN) 621855 UNIT/GM external cream Apply topically 2 times daily 30 g 0     order for DME Equipment being ordered:  Nebulizer with tubes/mask/acessories, use as directed 1 Device 0     oxyCODONE (ROXICODONE) 5 MG tablet Take half a pill every 12 hours as needed for pain. 30 tablet 0     polyethylene glycol (MIRALAX/GLYCOLAX) powder Take 17 g by mouth daily as needed for constipation        potassium chloride ER (KLOR-CON M) 20 MEQ CR tablet Take 1 tablet 2 x a day, with an additional 1 tablet on days when you take extra fursoemide 270 tablet 0     SIMBRINZA 1-0.2 % ophthalmic suspension Place 1 drop into the right eye 2 times daily 1 drop AM and PM  2     Sodium Fluoride (SF 5000 PLUS) 1.1 % CREA Apply to affected area 3 times daily       triamcinolone (KENALOG) 0.1 % external cream Apply topically 2 times daily 15 g 1     warfarin ANTICOAGULANT (COUMADIN) 4 MG tablet Take 1/2 tablet on Mon/Wed/Fri and 1 tablet all other days As directed. 90 tablet 1          Past Medical History     Past Medical History:   Diagnosis Date     Abnormal CXR 2018    then ct done and not significant     Acute diastolic heart failure (H) 03/22/2019    nl ef, 2+mr and tr with severe pulm htn     Ascending aorta dilatation (H) 04/2016    on echo, mild, fu 7/18 4.0, slightly larger     Cancer, metastatic to bone (H)     due to myeloma     Colonic polyp 2008    adenomatous, fu 2013 tics only     Compression fracture 2016    multiple areas of spine     Dry eyes      Elevated MCV 2015    b12 and folic acid nl     HTN (hypertension) 2000    off meds for years     Lung nodule 08/2018    on ct, 4mm, ct done for fu abnl cxr     Menorrhagia 2002    hysteroscopy and d and c done     MGUS (monoclonal gammopathy of unknown significance) 2015    eval by Dr. Roberts     Moderate to severe pulmonary hypertension (H) 03/2019    on echo     Multiple myeloma (H) 2016    dx 5/16 at [facility], bone lesions seen on mri 6/16     OAB (overactive bladder) 2013    Dr. Grullon     Osteoporosis     fu done 2010 and stable, went off meds then, fu done 2013; has had gyn fu and added  evista  by gyn     Palpitations     nl echo, mildly dilated asc aorta     Paroxysmal atrial fibrillation (H) 2016    had palp and ziopatch showed it, echo nl lv fxn, mild mr and tr, added coum and toprol, toprol dose raised 16; hosp 2019 for this 3x, then had av solomon ablation and ppm      Sciatica of left side     Dr. Helen Ricardo      SVT (supraventricular tachycardia) (H)     on ziopatch     Thrombocytopenia (H)      Past Surgical History:   Procedure Laterality Date     BONE MARROW BIOPSY, BONE SPECIMEN, NEEDLE/TROCAR N/A 2016    Procedure: BIOPSY BONE MARROW;  Surgeon: Bryan Patel MD;  Location:  GI     CATARACT IOL, RT/LT        SECTION  ,      COLONOSCOPY  2013    Procedure: COLONOSCOPY;  COLONOSCOPY;  Surgeon: Steffany Rockwell MD;  Location:  GI     EP ABLATION AV NODE N/A 2019    Procedure: EP Ablation AV Node;  Surgeon: Lee Menchaca MD;  Location:  HEART CARDIAC CATH LAB     EP PACEMAKER N/A 2019    Procedure: EP Pacemaker;  Surgeon: Lee Menchaca MD;  Location:  HEART CARDIAC CATH LAB     EXCISE EXOSTOSIS TIBIA / FIBULA  2014    Procedure: EXCISE EXOSTOSIS TIBIA / FIBULA;  Surgeon: Naila Pichardo MD;  Location:  SD     hysteroscopy and d and c      due to bleeding     left anle replacement       right ankle surgery       Family History   Problem Relation Age of Onset     Heart Disease Father      C.A.D. Mother      Cerebrovascular Disease Brother      Family History Negative Sister      Family History Negative Sister      Family History Negative Brother             Allergies   Blood transfusion related (informational only) and Penicillin [penicillins]          DAYSI Wolf [facility]  Pager: [phone]        Thank you for allowing me to participate in the care of your patient.      Sincerely,     DAYSI Wolf  ANSHU     [facility]  cc:   Ramos Rivera MD  [address]  [place] [address]

## 2021-09-02 ENCOUNTER — ANTICOAGULATION THERAPY VISIT (OUTPATIENT)
Dept: FAMILY MEDICINE | Facility: CLINIC | Age: 86
End: 2021-09-02

## 2021-09-02 DIAGNOSIS — Z79.01 LONG TERM CURRENT USE OF ANTICOAGULANT THERAPY: Primary | ICD-10-CM

## 2021-09-02 DIAGNOSIS — I48.0 PAROXYSMAL ATRIAL FIBRILLATION (H): ICD-10-CM

## 2021-09-02 LAB — INR (EXTERNAL): 2.9 (ref 0.9–1.1)

## 2021-09-02 NOTE — PROGRESS NOTES
ANTICOAGULATION MANAGEMENT     Amira Arreola 89 year old female is on warfarin with therapeutic INR result. (Goal INR 2.0-3.0)    Recent labs: (last 7 days)     09/02/21  1209   INR 2.9*       ASSESSMENT     Source(s): Home Care/Facility Nurse       Warfarin doses taken: Warfarin taken as instructed    Diet: No new diet changes identified    New illness, injury, or hospitalization: No    Medication/supplement changes: None noted    Signs or symptoms of bleeding or clotting: No    Previous INR: Therapeutic last 2(+) visits    Additional findings: None     PLAN     Recommended plan for no diet, medication or health factor changes affecting INR     Dosing Instructions: Continue your current warfarin dose with next INR in 4 weeks       Summary  As of 9/2/2021    Full warfarin instructions:  2 mg every Mon, Wed, Fri; 4 mg all other days   Next INR check:  9/30/2021             Telephone call with home care nurse Gerardo who verbalizes understanding and agrees to plan    Orders given to  Homecare nurse/facility to recheck    Education provided: Goal range and significance of current result    Plan made per [facility] anticoagulation protocol    Ernst Carlson RN  Anticoagulation Clinic  9/2/2021    _______________________________________________________________________     Anticoagulation Episode Summary     Current INR goal:  2.0-3.0   TTR:  72.1 % (1 y)   Target end date:  Indefinite   Send INR reminders to:  DANTE SUMNER    Indications    Long term current use of anticoagulant therapy [Z79.01]  Atrial fibrillation (H) [I48.91] (Resolved) [I48.91]  Paroxysmal atrial fibrillation (H) [I48.0]           Comments:   Rajiv HENSLEY [facility] [phone] private pay nursing visits to check INR         Anticoagulation Care Providers     Provider Role Specialty Phone number    Addy Frias MD Referring Internal Medicine [phone]

## 2021-09-30 NOTE — TELEPHONE ENCOUNTER
ANTICOAGULATION MANAGEMENT     Amira Arreola 89 year old female is on warfarin with therapeutic INR result. (Goal INR )    Recent labs: (last 7 days)     09/30/21  1416   INR 2.2*       ASSESSMENT     Source(s): Chart Review and Home Care/Facility Nurse       Warfarin doses taken: Warfarin taken as instructed    Diet: No new diet changes identified    New illness, injury, or hospitalization: No    Medication/supplement changes: None noted    Signs or symptoms of bleeding or clotting: No    Previous INR: Therapeutic last 2(+) visits    Additional findings: None     PLAN     Recommended plan for no diet, medication or health factor changes affecting INR     Dosing Instructions: Continue your current warfarin dose with next INR in 4 weeks       Summary  As of 9/30/2021    Full warfarin instructions:  2 mg every Mon, Wed, Fri; 4 mg all other days   Next INR check:               Telephone call with home care nurse Gerardo who verbalizes understanding and agrees to plan    Orders given to  Homecare nurse/facility to recheck    Education provided: Please call back if any changes to your diet, medications or how you've been taking warfarin    Plan made per [facility] anticoagulation protocol    Tameka Perez RN  Anticoagulation Clinic  9/30/2021    _______________________________________________________________________     Anticoagulation Episode Summary     Current INR goal:  2.0-3.0   TTR:  78.8 % (1 y)   Target end date:  Indefinite   Send INR reminders to:  DANTE SUMNER    Indications    Long term current use of anticoagulant therapy [Z79.01]  Atrial fibrillation (H) [I48.91] (Resolved) [I48.91]  Paroxysmal atrial fibrillation (H) [I48.0]           Comments:   Rajiv HENSLEY [facility] [phone] private pay nursing visits to check INR         Anticoagulation Care Providers     Provider Role Specialty Phone number    Addy Frias MD Referring Internal Medicine [phone]

## 2021-09-30 NOTE — TELEPHONE ENCOUNTER
SHELLIE Carrillo called with INR result.    Pt's INR today is 2.2.    RN will need a call back with dosing. He is with patient now.     Samantha call back number is [phone].

## 2021-10-05 NOTE — TELEPHONE ENCOUNTER
Gerardo, [facility] Verbal orders to recertify for home care:    Skilled nursing: INRs, once every 3-4 weeks with 3 PRN visits    Writer gave verbal approval for orders.    Callback: [phone]     Teena Enrique RN  [facility]

## 2021-10-06 NOTE — PROGRESS NOTES
Nursing Note:  Amira Arreola presents today for port labs.    Patient seen by provider today: No   present during visit today: Not Applicable.    Note: Flu vaccine requested and given.    Intravenous Access:  Labs drawn without difficulty.  Implanted Port.    Discharge Plan:   Patient was sent to [facility] for discharge.     Jamari Gamboa RN

## 2021-10-07 NOTE — LETTER
10/7/2021         RE: Amira Arreola  [address]  [place] [address]        Dear Colleague,    Thank you for referring your patient, Amira Arreola, to the [facility] [place]. Please see a copy of my visit note below.    Amira is a 89 year old who is being evaluated via a billable telephone visit.      What phone number would you like to be contacted at? [phone]  How would you like to obtain your AVS? 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     1. On 09/21/2015, WBC of 4.2, hemoglobin of 13.2 and platelets of 138.    -On 09/29/2015, SPEP does not reveal any M-spike.   -On 10/02/2015, JANET does not reveal any monoclonal protein.     -On 10/22/2015, urine immunofixation reveals monoclonal free kappa light chain.    2. On 05/11/2016, kappa light chain of 50, lambda light chain of 0.32 and ratio of kappa to lambda of 156.2.  3. Bone marrow biopsy on 05/25/2016 reveals 40-50% kappa light chain restricted plasma cells.  Cytogenetics is normal. FISH panel reveals translocation 11;14.    4. MRI of bones on 06/21/2016 and 06/22/2016 reveals myeloma lesions.  5. On 08/24/2016, she was started on revlimid with dexamethasone 20 mg weekly. She did not have any significant response to treatment.   6. Velcade and dexamethasone started on 03/21/2017.    7. Daratumumab added to velcade and dexamethasone on 05/31/2017.   -Velcade given every 14 days starting 08/01/2018.  -Treatment on hold. Last Velcade was on 06/05/2019.  Last daratumumab was on 05/22/2019. Dexamethasone continued.  8. On 05/22/2019, kappa free light chain of 1.21.     SUBJECTIVE:  Ms. Arreola is an 89-year-old female with kappa free light chain multiple myeloma.  The patient is on weekly dexamethasone 20 mg.  She is not on any other treatment.  We have been monitoring her.  Plan is to resume treatment if there is worsening.     The patient's    a week ago.  The patient currently staying with her daughter.     The patient is weak.  Son was on the phone.  As per him, sometimes she gets confused.  The patient does get some back pain.  She takes pain medication, which helps.     No headache.  No dizziness.  She has not fallen down.  No chest pain. No shortness of breath.  No nausea or vomiting.  Appetite is fair.     As per the son, patient's weakness is slowly progressing.     All other review of systems negative.     PHYSICAL EXAMINATION:    She is alert and oriented x3.  This was a telephone visit.     LABORATORY:  CBC, BMP, SPEP, and free light chain reviewed.     ASSESSMENT:     1.  An 89-year-old female with kappa free light chain myeloma, which is slowly progressing.  2.  General decline in her condition due to her age.  3.  Back pain, controlled.     PLAN:    1.  Myeloma is slowly progressing. The patient's overall condition has been slowly declining.  She is getting weaker.  She gets episodes of confusion.     The patient is 89 years old.  She will not be a candidate for myeloma-directed therapy because of the overall decline in her condition. I would not recommend doing any further monitoring for myeloma.  I discussed this with her daughter Dr. Desiree Arreola, who is an ophthalmologist. She agrees with no further monitoring for myeloma.     2.  Discussed regarding followup.  I would recommend that the patient follow up with her PCP and see me on an as-needed basis.  No return appointment being made.     TOTAL TELEPHONE VISIT TIME:  12 minutes.    This office note has been dictated.          Again, thank you for allowing me to participate in the care of your patient.        Sincerely,        Shayne Roberts MD

## 2021-10-07 NOTE — LETTER
10/7/2021         RE: Amira Arreola  [address]  [place] [address]        Dear Colleague,    Thank you for referring your patient, Amira Arreola, to the [facility] [place]. Please see a copy of my visit note below.    Amira is a 89 year old who is being evaluated via a billable telephone visit.      What phone number would you like to be contacted at? [phone]  How would you like to obtain your AVS? 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     1. On 09/21/2015, WBC of 4.2, hemoglobin of 13.2 and platelets of 138.    -On 09/29/2015, SPEP does not reveal any M-spike.   -On 10/02/2015, JANET does not reveal any monoclonal protein.     -On 10/22/2015, urine immunofixation reveals monoclonal free kappa light chain.    2. On 05/11/2016, kappa light chain of 50, lambda light chain of 0.32 and ratio of kappa to lambda of 156.2.  3. Bone marrow biopsy on 05/25/2016 reveals 40-50% kappa light chain restricted plasma cells.  Cytogenetics is normal. FISH panel reveals translocation 11;14.    4. MRI of bones on 06/21/2016 and 06/22/2016 reveals myeloma lesions.  5. On 08/24/2016, she was started on revlimid with dexamethasone 20 mg weekly. She did not have any significant response to treatment.   6. Velcade and dexamethasone started on 03/21/2017.    7. Daratumumab added to velcade and dexamethasone on 05/31/2017.   -Velcade given every 14 days starting 08/01/2018.  -Treatment on hold. Last Velcade was on 06/05/2019.  Last daratumumab was on 05/22/2019. Dexamethasone continued.  8. On 05/22/2019, kappa free light chain of 1.21.     SUBJECTIVE:  Ms. Arreola is an 89-year-old female with kappa free light chain multiple myeloma.  The patient is on weekly dexamethasone 20 mg.  She is not on any other treatment.  We have been monitoring her.  Plan is to resume treatment if there is worsening.     The patient's    a week ago.  The patient currently staying with her daughter.     The patient is weak.  Son was on the phone.  As per him, sometimes she gets confused.  The patient does get some back pain.  She takes pain medication, which helps.     No headache.  No dizziness.  She has not fallen down.  No chest pain. No shortness of breath.  No nausea or vomiting.  Appetite is fair.     As per the son, patient's weakness is slowly progressing.     All other review of systems negative.     PHYSICAL EXAMINATION:    She is alert and oriented x3.  This was a telephone visit.     LABORATORY:  CBC, BMP, SPEP, and free light chain reviewed.     ASSESSMENT:     1.  An 89-year-old female with kappa free light chain myeloma, which is slowly progressing.  2.  General decline in her condition due to her age.  3.  Back pain, controlled.     PLAN:    1.  Myeloma is slowly progressing. The patient's overall condition has been slowly declining.  She is getting weaker.  She gets episodes of confusion.     The patient is 89 years old.  She will not be a candidate for myeloma-directed therapy because of the overall decline in her condition. I would not recommend doing any further monitoring for myeloma.  I discussed this with her daughter Dr. Desiree Arreola, who is an ophthalmologist. She agrees with no further monitoring for myeloma.     2.  Discussed regarding followup.  I would recommend that the patient follow up with her PCP and see me on an as-needed basis.  No return appointment being made.     TOTAL TELEPHONE VISIT TIME:  12 minutes.    This office note has been dictated.          Again, thank you for allowing me to participate in the care of your patient.        Sincerely,        Shayne Roberts MD

## 2021-10-07 NOTE — PROGRESS NOTES
Amira is a 89 year old who is being evaluated via a billable telephone visit.      What phone number would you like to be contacted at? [phone]  How would you like to obtain your AVS? Mail a copy

## 2021-10-11 NOTE — PROGRESS NOTES
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     1. On 2015, WBC of 4.2, hemoglobin of 13.2 and platelets of 138.    -On 2015, SPEP does not reveal any M-spike.   -On 10/02/2015, JANET does not reveal any monoclonal protein.     -On 10/22/2015, urine immunofixation reveals monoclonal free kappa light chain.    2. On 2016, kappa light chain of 50, lambda light chain of 0.32 and ratio of kappa to lambda of 156.2.  3. Bone marrow biopsy on 2016 reveals 40-50% kappa light chain restricted plasma cells.  Cytogenetics is normal. FISH panel reveals translocation 11;14.    4. MRI of bones on 2016 and 2016 reveals myeloma lesions.  5. On 2016, she was started on revlimid with dexamethasone 20 mg weekly. She did not have any significant response to treatment.   6. Velcade and dexamethasone started on 2017.    7. Daratumumab added to velcade and dexamethasone on 2017.   -Velcade given every 14 days starting 2018.  -Treatment on hold. Last Velcade was on 2019.  Last daratumumab was on 2019. Dexamethasone continued.  8. On 2019, kappa free light chain of 1.21.     SUBJECTIVE:  Ms. Arreola is an 89-year-old female with kappa free light chain multiple myeloma.  The patient is on weekly dexamethasone 20 mg.  She is not on any other treatment.  We have been monitoring her.  Plan is to resume treatment if there is worsening.     The patient's   a week ago.  The patient currently staying with her daughter.     The patient is weak.  Son was on the phone.  As per him, sometimes she gets confused.  The patient does get some back pain.  She takes pain medication, which helps.     No headache.  No dizziness.  She has not fallen down.  No chest pain. No shortness of breath.  No nausea or vomiting.  Appetite is fair.     As per the son, patient's weakness is slowly progressing.     All other review  of systems negative.     PHYSICAL EXAMINATION:    She is alert and oriented x3.  This was a telephone visit.     LABORATORY:  CBC, BMP, SPEP, and free light chain reviewed.     ASSESSMENT:     1.  An 89-year-old female with kappa free light chain myeloma, which is slowly progressing.  2.  General decline in her condition due to her age.  3.  Back pain, controlled.     PLAN:    1.  Myeloma is slowly progressing. The patient's overall condition has been slowly declining.  She is getting weaker.  She gets episodes of confusion.     The patient is 89 years old.  She will not be a candidate for myeloma-directed therapy because of the overall decline in her condition. I would not recommend doing any further monitoring for myeloma.  I discussed this with her daughter Dr. Desiree Arreola, who is an ophthalmologist. She agrees with no further monitoring for myeloma.     2.  Discussed regarding followup.  I would recommend that the patient follow up with her PCP and see me on an as-needed basis.  No return appointment being made.     TOTAL TELEPHONE VISIT TIME:  12 minutes.

## 2021-10-28 NOTE — TELEPHONE ENCOUNTER
Gerardo [facility]  INR result: 2.4    Taking coumadin:  2mg MWF  4 mg daily    Please callback with dosing recommendations.     Callback: [phone] to leave detailed VM.    Teena Enrique RN  [facility]

## 2021-10-28 NOTE — TELEPHONE ENCOUNTER
ANTICOAGULATION MANAGEMENT     Amira Arreola 89 year old female is on warfarin with therapeutic INR result. (Goal INR )    Recent labs: (last 7 days)     10/28/21  1407   INR 2.4*       ASSESSMENT     Source(s): Chart Review and Home Care/Facility Nurse       Warfarin doses taken: Warfarin taken as instructed    Diet: No new diet changes identified    New illness, injury, or hospitalization: No    Medication/supplement changes: None noted    Signs or symptoms of bleeding or clotting: No    Previous INR: Therapeutic last 2(+) visits    Additional findings: None     PLAN     Recommended plan for no diet, medication or health factor changes affecting INR     Dosing Instructions: Continue your current warfarin dose with next INR in 5 weeks       Summary  As of 10/28/2021    Full warfarin instructions:  2 mg every Mon, Wed, Fri; 4 mg all other days   Next INR check:               Telephone call with home care nurse Gerardo who agrees to plan and repeated back plan correctly    Orders given to  Homecare nurse/facility to recheck    Education provided: Please call back if any changes to your diet, medications or how you've been taking warfarin    Plan made per [facility] anticoagulation protocol    Suyapa Walton RN  Anticoagulation Clinic  10/28/2021    _______________________________________________________________________     Anticoagulation Episode Summary     Current INR goal:  2.0-3.0   TTR:  79.4 % (1 y)   Target end date:  Indefinite   Send INR reminders to:  DANTE SUMNER    Indications    Long term current use of anticoagulant therapy [Z79.01]  Atrial fibrillation (H) [I48.91] (Resolved) [I48.91]  Paroxysmal atrial fibrillation (H) [I48.0]           Comments:   Rajiv HENSLEY [facility] [phone] private pay nursing visits to check INR         Anticoagulation Care Providers     Provider Role Specialty Phone number    Addy Frias MD Referring Internal Medicine [phone]

## 2021-11-10 NOTE — PLAN OF CARE
Discharge Planner OT   Patient plan for discharge: home  Current status: OT eval completed and treatment initiated in AM. Pt performed bed mob SBA, amb to/from bathroom using walker with CGA, completed toileting task with SBA and cues for safe walker manipulation in bathroom. Pt weak, declined further activity as had previously walked with PT in [place].   Barriers to return to prior living situation: none anticipated  Recommendations for discharge: home w/home health OT and continued family A  Rationale for recommendations: pt has HHA who assists with bathing, family does all house hold tasks. Family reports there is usually someone home with patient. She would benefit from HH OT to further assess needs to assure safety with ambulation and ADLs throughout her living environment.        Entered by: Alverto Escobar 06/30/2019 4:17 PM       No

## 2021-12-02 NOTE — TELEPHONE ENCOUNTER
Dr. Frias,    Gerardo calling in from [facility] to request orders.     Skilled Nursing visits 1 x every 3 weeks and 2 prn visit- for INR management    Is this okay?    Gerardo also reporting INR result of 2.1 that was collected today at 1045          Can we leave a detailed message on this number? YES  Phone number patient can be reached at: Other phone number:  Gerardo [phone]    Chacha Brown, RN  [facility] Triage

## 2021-12-02 NOTE — PROGRESS NOTES
ANTICOAGULATION MANAGEMENT     Amira Arreola 89 year old female is on warfarin with therapeutic INR result. (Goal INR 2.0-3.0)    Recent labs: (last 7 days)     12/02/21  1045   INR 2.1*       ASSESSMENT     Source(s): Chart Review and Home Care/Facility Nurse       Warfarin doses taken: Warfarin taken as instructed    Diet: No new diet changes identified    New illness, injury, or hospitalization: No    Medication/supplement changes: None noted    Signs or symptoms of bleeding or clotting: No    Previous INR: Therapeutic last 2(+) visits    Additional findings: None     PLAN     Recommended plan for no diet, medication or health factor changes affecting INR     Dosing Instructions: Continue your current warfarin dose with next INR in 5 weeks       Summary  As of 12/2/2021    Full warfarin instructions:  2 mg every Mon, Wed, Fri; 4 mg all other days   Next INR check:  1/6/2022             Telephone call with home care nurse Gerardo who verbalizes understanding and agrees to plan    Orders given to  Homecare nurse/facility to recheck    Education provided: Please call back if any changes to your diet, medications or how you've been taking warfarin    Plan made per [facility] anticoagulation protocol    Senia Sanchez RN  Anticoagulation Clinic  12/2/2021    _______________________________________________________________________     Anticoagulation Episode Summary     Current INR goal:  2.0-3.0   TTR:  84.8 % (1 y)   Target end date:  Indefinite   Send INR reminders to:  DANTE SUMNER    Indications    Long term current use of anticoagulant therapy [Z79.01]  Atrial fibrillation (H) [I48.91] (Resolved) [I48.91]  Paroxysmal atrial fibrillation (H) [I48.0]           Comments:   Rajiv HENSLEY [facility] [phone] private pay nursing visits to check INR         Anticoagulation Care Providers     Provider Role Specialty Phone number    Addy Frias MD Referring Internal Medicine [phone]

## 2021-12-02 NOTE — TELEPHONE ENCOUNTER
Anticoagulation Management    Unable to reach Gerardo today.    Today's INR result of 2.1 is therapeutic (goal INR of 2.0-3.0).  Result received from: Home Care    Follow up required to confirm warfarin dose taken and assess for changes    [facility]      Anticoagulation clinic to follow up    Ernst Carlson RN

## 2021-12-16 NOTE — PROGRESS NOTES
Cardiology Clinic Progress Note  Amira Arreola MRN# [id number]   YOB: 1932 Age: 89 year old   Primary Cardiologist: Dr. Rivera Reason for visit: CORE follow up             Assessment and Plan:   Amira Arreola is a very pleasant 89year old female with a history of HFpEF, chronic atrial fibrillation, hypertension, mitral regurgitation, tricuspid regurgitation and hx of multiple myeloma.      1.  Chronic diastolic heart failure/HFpEF - Echocardiogram completed 3/23/19 LVEF 55-60%, no wall motion abnormalities, moderate mitral regurgitation, moderate tricuspid regurgitation, and severe pulmonary hypertension.               - NYHA class III, stage C              - Dry weight : ~ 140#              - Diuretic regimen : continue furosemide to 60mg daily, additional 20mg for weight gain >2# overnight or increased edema.               - Continue potassium to 20meq BID and additional 20meq on days when she takes an extra furosemide.               - Aldosterone antagonist : none  2. Chronic atrial fibrillation - s/p AV solomon ablation with PPM implantation 7/5/19              - [id number] score 5 (HTN, HF, age, female)              - Continue warfarin for thromboembolic prophylaxis.   3. Moderate mitral regurgitation, moderate tricuspid regurgitation               - Will consider repeat echocardiogram in the future if worsening symptoms, currently won't change any management clinically.  Last echo 3/2019.  4. Severe pulmonary hypertension  5. Multiple myeloma with mets to bone - used to follow with Dr. Roberts, who recommended no further monitoring of myeloma.     I saw Amira and her daughter today for CORE follow up. She is doing well from a heart failure perspective, she appears close to euvolemic today. Weight is up slightly in clinic today and she has not been monitoring her weight at home. Plan to continue furosemide 60mg daily with additional 20mg PRN. She is resistant to adjustments in her diuretics  due to increased urination, stating it affects her quality of life. We have compromised allowing her to be slightly volume up to allow her some flexibility with her diuretics and not increase the dosage. No medication changes today. Support given. All questions answered.         Changes today: none    Follow up plan:     CORE follow up in 4 months with labs prior        History of Presenting Illness:    Amira Arreola is a very pleasant 89 year old female with a history of HFpEF, chronic atrial fibrillation, hypertension, mitral regurgitation, tricuspid regurgitation and hx of multiple myeloma.      Amira had multiple hospitalizations last summer (2019) for atrial fibrillation and decompensated diastolic HF, ultimately undergoing an AV solomon ablation with PPM 7/5/19. Echocardiogram completed 3/23/19 LVEF 55-60%, no wall motion abnormalities, moderate mitral regurgitation, moderate tricuspid regurgitation, and severe pulmonary hypertension.      Patient has been following closely in CORE clinic. She saw Dr. Rivera in April 2021 at which time no medications were changed. Weight was stable ~ 150#.      She saw Dr. Roberts in October for her slowly progressing multiple myeloma at which time it  patient had increasing weakness/confusion, noting patient would not be a candidate for myeloma-directed therapy due to her overall decline and recommended no further myeloma monitoring and recommended follow up as needed.     I last saw her for CORE follow up in August at which time she was doing well from a heart failure standpoint, no medication changes were made.     Patient is here today for CORE follow up with her daughter.     Patient reports feeling good. Monitoring weights daily a home, scale is broke. Denies shortness of breath at rest. Denies orthopnea or PND. Exertional dyspnea with activity. Some lower extremity edema, which has been controlled. Denies chest pain or chest tightness. Denies dizziness, lightheadedness  or other presyncopal symptoms. Denies tachycardia or palpitations. Taking medications daily, didn't take diuretics today with clinic visits. Taking furosemide 60mg daily. They feel they are in a good routine at home.     Labs today showed stable kidney function and electrolytes. Blood pressure 153/85 and HR 70 in clinic today.    Appetite good. Enjoys sandwiches, cheese. Eating some fresh fruits daily. Drinking 2-3 diet cokes, 1 cup coffee, 1 cup milk, 1-2 glasses water. No set exercise routine. Using walker and wheelchair for support. Denies tobacco or alcohol use. Has a home health care aide 5 days per week.         Social History    ,  -  passed away Summer 2021, 6 children. Retired nurse.  Social History     Socioeconomic History     Marital status:      Spouse name: Tom     Number of children: 6     Years of education: Not on file     Highest education level: Not on file   Occupational History     Occupation: rn anesthetist     Employer: RETIRED   Tobacco Use     Smoking status: Never Smoker     Smokeless tobacco: Never Used   Substance and Sexual Activity     Alcohol use: No     Alcohol/week: 0.0 standard drinks     Drug use: No     Sexual activity: Never   Other Topics Concern     Parent/sibling w/ CABG, MI or angioplasty before 65F 55M? Not Asked   Social History Narrative     Not on file     Social Determinants of Health     Financial Resource Strain: Not on file   Food Insecurity: Not on file   Transportation Needs: Not on file   Physical Activity: Not on file   Stress: Not on file   Social Connections: Not on file   Intimate Partner Violence: Not on file   Housing Stability: Not on file            Review of Systems:   Skin:  Negative bruising   Eyes:  Negative glaucoma  ENT:  Negative    Respiratory:  Negative dyspnea on exertion  Cardiovascular:  Negative;palpitations;chest pain;syncope or near-syncope;cyanosis Positive for;edema;fatigue  Gastroenterology: Negative  "constipation  Genitourinary:  Positive for urinary frequency  Musculoskeletal:  Positive for back pain  Neurologic:  Negative local weakness;incoordination;numbness or tingling of feet  Psychiatric:  Negative sleep disturbances  Heme/Lymph/Imm:  Positive for allergies  Endocrine:  Negative           Physical Exam:   Vitals: BP (!) 153/85   Pulse 70   Ht 1.651 m (5' 5\")   Wt 70.1 kg (154 lb 9.6 oz)   SpO2 100%   BMI 25.73 kg/m     Wt Readings from Last 4 Encounters:   12/16/21 70.1 kg (154 lb 9.6 oz)   08/24/21 65.8 kg (145 lb)   04/26/21 68.2 kg (150 lb 6.4 oz)   04/13/21 68.5 kg (151 lb)     GEN: well nourished, in no acute distress.  HEENT:  Pupils equal, round. Sclerae nonicteric.   NECK: Supple, no masses appreciated.   C/V:  Regular rate and rhythm, no murmur, rub or gallop.    RESP: Respirations are unlabored. Clear to auscultation bilaterally without wheezing, rales, or rhonchi.  GI: Abdomen soft, nontender.  EXTREM: Trace to +1 bilateral LE edema.  NEURO: Alert and oriented, cooperative.  SKIN: Warm and dry.        Data:     LIPID RESULTS:  Lab Results   Component Value Date    CHOL 160 10/12/2016    HDL 76 10/12/2016    LDL 71 10/12/2016    TRIG 66 10/12/2016    CHOLHDLRATIO 2.3 09/21/2015     LIVER ENZYME RESULTS:  Lab Results   Component Value Date    AST 20 01/06/2021    ALT 19 01/06/2021     CBC RESULTS:  Lab Results   Component Value Date    WBC 7.9 10/06/2021    WBC 7.3 07/06/2021    RBC 4.13 10/06/2021    RBC 4.28 07/06/2021    HGB 10.7 (L) 10/06/2021    HGB 10.6 (L) 07/06/2021    HCT 35.2 10/06/2021    HCT 34.7 (L) 07/06/2021    MCV 85 10/06/2021    MCV 81 07/06/2021    MCH 25.9 (L) 10/06/2021    MCH 24.8 (L) 07/06/2021    MCHC 30.4 (L) 10/06/2021    MCHC 30.5 (L) 07/06/2021    RDW 16.0 (H) 10/06/2021    RDW 18.2 (H) 07/06/2021     10/06/2021     07/06/2021     BMP RESULTS:  Lab Results   Component Value Date     12/16/2021     07/06/2021    POTASSIUM 4.3 12/16/2021    " POTASSIUM 3.8 07/06/2021    CHLORIDE 110 (H) 12/16/2021    CHLORIDE 112 (H) 07/06/2021    CO2 25 12/16/2021    CO2 27 07/06/2021    ANIONGAP 5 12/16/2021    ANIONGAP 3 07/06/2021    GLC 99 12/16/2021     (H) 07/06/2021    BUN 16 12/16/2021    BUN 17 07/06/2021    CR 0.92 12/16/2021    CR 0.97 07/06/2021    GFRESTIMATED 55 (L) 12/16/2021    GFRESTIMATED 52 (L) 07/06/2021    GFRESTBLACK 60 (L) 07/06/2021    BRADLEY 9.7 12/16/2021    BRADLEY 9.3 07/06/2021      INR RESULTS:  Lab Results   Component Value Date    INR 2.1 (A) 12/02/2021    INR 2.4 (A) 10/28/2021    INR 2.3 07/13/2021    INR 2.6 (A) 06/22/2021            Medications     Current Outpatient Medications   Medication Sig Dispense Refill     acetaminophen (TYLENOL) 500 MG tablet Take 500 mg by mouth every 6 hours as needed for mild pain        Carboxymethylcellulose Sod PF (REFRESH PLUS) 0.5 % SOLN ophthalmic solution 1 drop 4 times daily as needed for dry eyes       dexamethasone (DECADRON) 4 MG tablet Take 5 tablets (20 mg) by mouth every 7 days 20 tablet 4     furosemide (LASIX) 20 MG tablet Take 3 (60mg) tablets daily, if weight > 137 pounds take an additional 20mg (1 tablet) 270 tablet 3     latanoprost (XALATAN) 0.005 % ophthalmic solution Place 1 drop into the right eye At Bedtime       lidocaine-prilocaine (EMLA) cream Apply to port site 1 hour prior to access 30 g 1     Multiple Vitamins-Minerals (DAILY MULTIVITAMIN) CAPS Take 1 tablet by mouth daily        nystatin (MYCOSTATIN) 690983 UNIT/GM external cream Apply topically 2 times daily 30 g 0     order for DME Equipment being ordered: Nebulizer with tubes/mask/acessories, use as directed 1 Device 0     oxyCODONE (ROXICODONE) 5 MG tablet Take half a pill every 12 hours as needed for pain. 30 tablet 0     polyethylene glycol (MIRALAX/GLYCOLAX) powder Take 17 g by mouth daily as needed for constipation        potassium chloride ER (KLOR-CON M) 20 MEQ CR tablet Take 1 tablet 2 x a day, with an additional  1 tablet on days when you take extra fursoemide 270 tablet 0     SIMBRINZA 1-0.2 % ophthalmic suspension Place 1 drop into the right eye 2 times daily 1 drop AM and PM  2     Sodium Fluoride (SF 5000 PLUS) 1.1 % CREA Apply to affected area 3 times daily       triamcinolone (KENALOG) 0.1 % external cream Apply topically 2 times daily 15 g 1     warfarin ANTICOAGULANT (COUMADIN) 4 MG tablet Take 1/2 tablet on Mon/Wed/Fri and 1 tablet all other days As directed. 90 tablet 1          Past Medical History     Past Medical History:   Diagnosis Date     Abnormal CXR 2018    then ct done and not significant     Acute diastolic heart failure (H) 03/22/2019    nl ef, 2+mr and tr with severe pulm htn     Ascending aorta dilatation (H) 04/2016    on echo, mild, fu 7/18 4.0, slightly larger     Cancer, metastatic to bone (H)     due to myeloma     Colonic polyp 2008    adenomatous, fu 2013 tics only     Compression fracture 2016    multiple areas of spine     Dry eyes      Elevated MCV 2015    b12 and folic acid nl     HTN (hypertension) 2000    off meds for years     Lung nodule 08/2018    on ct, 4mm, ct done for fu abnl cxr     Menorrhagia 2002    hysteroscopy and d and c done     MGUS (monoclonal gammopathy of unknown significance) 2015    eval by Dr. Roberts     Moderate to severe pulmonary hypertension (H) 03/2019    on echo     Multiple myeloma (H) 2016    dx 5/16 at [facility], bone lesions seen on mri 6/16     OAB (overactive bladder) 2013    Dr. Grullon     Osteoporosis     fu done 2010 and stable, went off meds then, fu done 2013; has had gyn fu and added evista 2013 by gyn     Palpitations 4/16    nl echo, mildly dilated asc aorta     Paroxysmal atrial fibrillation (H) 04/2016    had palp and ziopatch showed it, echo nl lv fxn, mild mr and tr, added coum and toprol, toprol dose raised 12/22/16; hosp 2019 for this 3x, then had av solomon ablation and ppm 7/19     Sciatica of left side 12/13    Dr. Helen Ricardo 2004      SVT (supraventricular tachycardia) (H)     on ziopatch     Thrombocytopenia (H)      Past Surgical History:   Procedure Laterality Date     BONE MARROW BIOPSY, BONE SPECIMEN, NEEDLE/TROCAR N/A 2016    Procedure: BIOPSY BONE MARROW;  Surgeon: Bryan Patel MD;  Location:  GI     CATARACT IOL, RT/LT        SECTION  1965, 1966     COLONOSCOPY  2013    Procedure: COLONOSCOPY;  COLONOSCOPY;  Surgeon: Steffany Rockwell MD;  Location:  GI     EP ABLATION AV NODE N/A 2019    Procedure: EP Ablation AV Node;  Surgeon: Lee Menchaca MD;  Location:  [facility]ATH [facility]     EP PACEMAKER N/A 2019    Procedure: EP Pacemaker;  Surgeon: Lee Menchaca MD;  Location:  HEART [facility]ARD[facility] CATH LAB     EXCISE EXOSTOSIS TIBIA / FIBULA  2014    Procedure: EXCISE EXOSTOSIS TIBIA / FIBULA;  Surgeon: Naila Pichardo MD;  Location:  SD     hysteroscopy and d and c      due to bleeding     left anle replacement       right ankle surgery       Family History   Problem Relation Age of Onset     Heart Disease Father      C.A.D. Mother      Cerebrovascular Disease Brother      Family History Negative Sister      Family History Negative Sister      Family History Negative Brother             Allergies   Blood transfusion related (informational only) and Penicillin [penicillins]    40 minutes spent on the date of the encounter doing chart review, history and exam, documentation and further activities as noted above      DAYSI Wolf [facility]  Pager: [phone]

## 2021-12-16 NOTE — LETTER
12/16/2021    Addy Frias MD  [address]  [place] [address]    RE: Amira Arreola       Dear Colleague,     I had the pleasure of seeing Amira Arreola in the [facility].  Cardiology Clinic Progress Note  Amira Arreola MRN# [id number]   YOB: 1932 Age: 89 year old   Primary Cardiologist: Dr. Rivera Reason for visit: CORE follow up             Assessment and Plan:   Amira Arreola is a very pleasant 89year old female with a history of HFpEF, chronic atrial fibrillation, hypertension, mitral regurgitation, tricuspid regurgitation and hx of multiple myeloma.      1.  Chronic diastolic heart failure/HFpEF - Echocardiogram completed 3/23/19 LVEF 55-60%, no wall motion abnormalities, moderate mitral regurgitation, moderate tricuspid regurgitation, and severe pulmonary hypertension.               - NYHA class III, stage C              - Dry weight : ~ 140#              - Diuretic regimen : continue furosemide to 60mg daily, additional 20mg for weight gain >2# overnight or increased edema.               - Continue potassium to 20meq BID and additional 20meq on days when she takes an extra furosemide.               - Aldosterone antagonist : none  2. Chronic atrial fibrillation - s/p AV solomon ablation with PPM implantation 7/5/19              - [id number] score 5 (HTN, HF, age, female)              - Continue warfarin for thromboembolic prophylaxis.   3. Moderate mitral regurgitation, moderate tricuspid regurgitation               - Will consider repeat echocardiogram in the future if worsening symptoms, currently won't change any management clinically.  Last echo 3/2019.  4. Severe pulmonary hypertension  5. Multiple myeloma with mets to bone - used to follow with Dr. Roberts, who recommended no further monitoring of myeloma.     I saw Amira and her daughter today for CORE follow up. She is doing well from a heart failure perspective, she appears close to euvolemic  today. Weight is up slightly in clinic today and she has not been monitoring her weight at home. Plan to continue furosemide 60mg daily with additional 20mg PRN. She is resistant to adjustments in her diuretics due to increased urination, stating it affects her quality of life. We have compromised allowing her to be slightly volume up to allow her some flexibility with her diuretics and not increase the dosage. No medication changes today. Support given. All questions answered.         Changes today: none    Follow up plan:     CORE follow up in 4 months with labs prior        History of Presenting Illness:    Amira Arreola is a very pleasant 89 year old female with a history of HFpEF, chronic atrial fibrillation, hypertension, mitral regurgitation, tricuspid regurgitation and hx of multiple myeloma.      Amira had multiple hospitalizations last summer (2019) for atrial fibrillation and decompensated diastolic HF, ultimately undergoing an AV solomon ablation with PPM 7/5/19. Echocardiogram completed 3/23/19 LVEF 55-60%, no wall motion abnormalities, moderate mitral regurgitation, moderate tricuspid regurgitation, and severe pulmonary hypertension.      Patient has been following closely in CORE clinic. She saw Dr. Rivera in April 2021 at which time no medications were changed. Weight was stable ~ 150#.      She saw Dr. Roberts in October for her slowly progressing multiple myeloma at which time it  patient had increasing weakness/confusion, noting patient would not be a candidate for myeloma-directed therapy due to her overall decline and recommended no further myeloma monitoring and recommended follow up as needed.     I last saw her for CORE follow up in August at which time she was doing well from a heart failure standpoint, no medication changes were made.     Patient is here today for CORE follow up with her daughter.     Patient reports feeling good. Monitoring weights daily a home, scale is broke. Denies  shortness of breath at rest. Denies orthopnea or PND. Exertional dyspnea with activity. Some lower extremity edema, which has been controlled. Denies chest pain or chest tightness. Denies dizziness, lightheadedness or other presyncopal symptoms. Denies tachycardia or palpitations. Taking medications daily, didn't take diuretics today with clinic visits. Taking furosemide 60mg daily. They feel they are in a good routine at home.     Labs today showed stable kidney function and electrolytes. Blood pressure 153/85 and HR 70 in clinic today.    Appetite good. Enjoys sandwiches, cheese. Eating some fresh fruits daily. Drinking 2-3 diet cokes, 1 cup coffee, 1 cup milk, 1-2 glasses water. No set exercise routine. Using walker and wheelchair for support. Denies tobacco or alcohol use. Has a home health care aide 5 days per week.         Social History    ,  -  passed away Summer 2021, 6 children. Retired nurse.  Social History     Socioeconomic History     Marital status:      Spouse name: Tom     Number of children: 6     Years of education: Not on file     Highest education level: Not on file   Occupational History     Occupation: rn anesthetist     Employer: RETIRED   Tobacco Use     Smoking status: Never Smoker     Smokeless tobacco: Never Used   Substance and Sexual Activity     Alcohol use: No     Alcohol/week: 0.0 standard drinks     Drug use: No     Sexual activity: Never   Other Topics Concern     Parent/sibling w/ CABG, MI or angioplasty before 65F 55M? Not Asked   Social History Narrative     Not on file     Social Determinants of Health     Financial Resource Strain: Not on file   Food Insecurity: Not on file   Transportation Needs: Not on file   Physical Activity: Not on file   Stress: Not on file   Social Connections: Not on file   Intimate Partner Violence: Not on file   Housing Stability: Not on file            Review of Systems:   Skin:  Negative bruising   Eyes:  Negative  "glaucoma  ENT:  Negative    Respiratory:  Negative dyspnea on exertion  Cardiovascular:  Negative;palpitations;chest pain;syncope or near-syncope;cyanosis Positive for;edema;fatigue  Gastroenterology: Negative constipation  Genitourinary:  Positive for urinary frequency  Musculoskeletal:  Positive for back pain  Neurologic:  Negative local weakness;incoordination;numbness or tingling of feet  Psychiatric:  Negative sleep disturbances  Heme/Lymph/Imm:  Positive for allergies  Endocrine:  Negative           Physical Exam:   Vitals: BP (!) 153/85   Pulse 70   Ht 1.651 m (5' 5\")   Wt 70.1 kg (154 lb 9.6 oz)   SpO2 100%   BMI 25.73 kg/m     Wt Readings from Last 4 Encounters:   12/16/21 70.1 kg (154 lb 9.6 oz)   08/24/21 65.8 kg (145 lb)   04/26/21 68.2 kg (150 lb 6.4 oz)   04/13/21 68.5 kg (151 lb)     GEN: well nourished, in no acute distress.  HEENT:  Pupils equal, round. Sclerae nonicteric.   NECK: Supple, no masses appreciated.   C/V:  Regular rate and rhythm, no murmur, rub or gallop.    RESP: Respirations are unlabored. Clear to auscultation bilaterally without wheezing, rales, or rhonchi.  GI: Abdomen soft, nontender.  EXTREM: Trace to +1 bilateral LE edema.  NEURO: Alert and oriented, cooperative.  SKIN: Warm and dry.        Data:     LIPID RESULTS:  Lab Results   Component Value Date    CHOL 160 10/12/2016    HDL 76 10/12/2016    LDL 71 10/12/2016    TRIG 66 10/12/2016    CHOLHDLRATIO 2.3 09/21/2015     LIVER ENZYME RESULTS:  Lab Results   Component Value Date    AST 20 01/06/2021    ALT 19 01/06/2021     CBC RESULTS:  Lab Results   Component Value Date    WBC 7.9 10/06/2021    WBC 7.3 07/06/2021    RBC 4.13 10/06/2021    RBC 4.28 07/06/2021    HGB 10.7 (L) 10/06/2021    HGB 10.6 (L) 07/06/2021    HCT 35.2 10/06/2021    HCT 34.7 (L) 07/06/2021    MCV 85 10/06/2021    MCV 81 07/06/2021    MCH 25.9 (L) 10/06/2021    MCH 24.8 (L) 07/06/2021    MCHC 30.4 (L) 10/06/2021    MCHC 30.5 (L) 07/06/2021    RDW 16.0 " (H) 10/06/2021    RDW 18.2 (H) 07/06/2021     10/06/2021     07/06/2021     BMP RESULTS:  Lab Results   Component Value Date     12/16/2021     07/06/2021    POTASSIUM 4.3 12/16/2021    POTASSIUM 3.8 07/06/2021    CHLORIDE 110 (H) 12/16/2021    CHLORIDE 112 (H) 07/06/2021    CO2 25 12/16/2021    CO2 27 07/06/2021    ANIONGAP 5 12/16/2021    ANIONGAP 3 07/06/2021    GLC 99 12/16/2021     (H) 07/06/2021    BUN 16 12/16/2021    BUN 17 07/06/2021    CR 0.92 12/16/2021    CR 0.97 07/06/2021    GFRESTIMATED 55 (L) 12/16/2021    GFRESTIMATED 52 (L) 07/06/2021    GFRESTBLACK 60 (L) 07/06/2021    BRADLEY 9.7 12/16/2021    BRADLEY 9.3 07/06/2021      INR RESULTS:  Lab Results   Component Value Date    INR 2.1 (A) 12/02/2021    INR 2.4 (A) 10/28/2021    INR 2.3 07/13/2021    INR 2.6 (A) 06/22/2021            Medications     Current Outpatient Medications   Medication Sig Dispense Refill     acetaminophen (TYLENOL) 500 MG tablet Take 500 mg by mouth every 6 hours as needed for mild pain        Carboxymethylcellulose Sod PF (REFRESH PLUS) 0.5 % SOLN ophthalmic solution 1 drop 4 times daily as needed for dry eyes       dexamethasone (DECADRON) 4 MG tablet Take 5 tablets (20 mg) by mouth every 7 days 20 tablet 4     furosemide (LASIX) 20 MG tablet Take 3 (60mg) tablets daily, if weight > 137 pounds take an additional 20mg (1 tablet) 270 tablet 3     latanoprost (XALATAN) 0.005 % ophthalmic solution Place 1 drop into the right eye At Bedtime       lidocaine-prilocaine (EMLA) cream Apply to port site 1 hour prior to access 30 g 1     Multiple Vitamins-Minerals (DAILY MULTIVITAMIN) CAPS Take 1 tablet by mouth daily        nystatin (MYCOSTATIN) 806351 UNIT/GM external cream Apply topically 2 times daily 30 g 0     order for DME Equipment being ordered: Nebulizer with tubes/mask/acessories, use as directed 1 Device 0     oxyCODONE (ROXICODONE) 5 MG tablet Take half a pill every 12 hours as needed for pain. 30  tablet 0     polyethylene glycol (MIRALAX/GLYCOLAX) powder Take 17 g by mouth daily as needed for constipation        potassium chloride ER (KLOR-CON M) 20 MEQ CR tablet Take 1 tablet 2 x a day, with an additional 1 tablet on days when you take extra fursoemide 270 tablet 0     SIMBRINZA 1-0.2 % ophthalmic suspension Place 1 drop into the right eye 2 times daily 1 drop AM and PM  2     Sodium Fluoride (SF 5000 PLUS) 1.1 % CREA Apply to affected area 3 times daily       triamcinolone (KENALOG) 0.1 % external cream Apply topically 2 times daily 15 g 1     warfarin ANTICOAGULANT (COUMADIN) 4 MG tablet Take 1/2 tablet on Mon/Wed/Fri and 1 tablet all other days As directed. 90 tablet 1          Past Medical History     Past Medical History:   Diagnosis Date     Abnormal CXR 2018    then ct done and not significant     Acute diastolic heart failure (H) 03/22/2019    nl ef, 2+mr and tr with severe pulm htn     Ascending aorta dilatation (H) 04/2016    on echo, mild, fu 7/18 4.0, slightly larger     Cancer, metastatic to bone (H)     due to myeloma     Colonic polyp 2008    adenomatous, fu 2013 tics only     Compression fracture 2016    multiple areas of spine     Dry eyes      Elevated MCV 2015    b12 and folic acid nl     HTN (hypertension) 2000    off meds for years     Lung nodule 08/2018    on ct, 4mm, ct done for fu abnl cxr     Menorrhagia 2002    hysteroscopy and d and c done     MGUS (monoclonal gammopathy of unknown significance) 2015    eval by Dr. Roberts     Moderate to severe pulmonary hypertension (H) 03/2019    on echo     Multiple myeloma (H) 2016    dx 5/16 at [place], bone lesions seen on mri 6/16     OAB (overactive bladder) 2013    Dr. Grullon     Osteoporosis     fu done 2010 and stable, went off meds then, fu done 2013; has had gyn fu and added evista 2013 by gyn     Palpitations 4/16    nl echo, mildly dilated asc aorta     Paroxysmal atrial fibrillation (H) 04/2016    had palp and ziopatch showed it,  echo nl lv fxn, mild mr and tr, added coum and toprol, toprol dose raised 16; hosp 2019 for this 3x, then had av solomon ablation and ppm      Sciatica of left side     Dr. Helen Ricardo      SVT (supraventricular tachycardia) (H)     on ziopatch     Thrombocytopenia (H)      Past Surgical History:   Procedure Laterality Date     BONE MARROW BIOPSY, BONE SPECIMEN, NEEDLE/TROCAR N/A 2016    Procedure: BIOPSY BONE MARROW;  Surgeon: Bryan Patel MD;  Location:  GI     CATARACT IOL, RT/LT        SECTION  ,      COLONOSCOPY  2013    Procedure: COLONOSCOPY;  COLONOSCOPY;  Surgeon: Steffany Rockwell MD;  Location:  GI     EP ABLATION AV NODE N/A 2019    Procedure: EP Ablation AV Node;  Surgeon: Lee Menchaca MD;  Location:  HEART CARDIAC CATH LAB     EP PACEMAKER N/A 2019    Procedure: EP Pacemaker;  Surgeon: Lee Menchaca MD;  Location:  HEART CARDIAC CATH LAB     EXCISE EXOSTOSIS TIBIA / FIBULA  2014    Procedure: EXCISE EXOSTOSIS TIBIA / FIBULA;  Surgeon: Naila Pichardo MD;  Location:  SD     hysteroscopy and d and c      due to bleeding     left anle replacement       right ankle surgery       Family History   Problem Relation Age of Onset     Heart Disease Father      C.A.D. Mother      Cerebrovascular Disease Brother      Family History Negative Sister      Family History Negative Sister      Family History Negative Brother             Allergies   Blood transfusion related (informational only) and Penicillin [penicillins]    40 minutes spent on the date of the encounter doing chart review, history and exam, documentation and further activities as noted above      DAYSI Wolf CNP  [facility]  Pager: [phone]        Thank you for allowing me to participate in the care of your patient.      Sincerely,     DAYSI Wolf CNP     [facility]  [facility]  cc:   Elisabeth Hicks, DAYSI CNP  [address]  [place] [address]

## 2022-01-01 ENCOUNTER — ANTICOAGULATION THERAPY VISIT (OUTPATIENT)
Dept: ANTICOAGULATION | Facility: CLINIC | Age: 87
End: 2022-01-01
Payer: MEDICARE

## 2022-01-01 ENCOUNTER — ANTICOAGULATION THERAPY VISIT (OUTPATIENT)
Dept: ANTICOAGULATION | Facility: CLINIC | Age: 87
End: 2022-01-01

## 2022-01-01 ENCOUNTER — OFFICE VISIT (OUTPATIENT)
Dept: CARDIOLOGY | Facility: CLINIC | Age: 87
End: 2022-01-01
Attending: NURSE PRACTITIONER
Payer: MEDICARE

## 2022-01-01 ENCOUNTER — APPOINTMENT (OUTPATIENT)
Dept: OCCUPATIONAL THERAPY | Facility: CLINIC | Age: 87
DRG: 178 | End: 2022-01-01
Payer: MEDICARE

## 2022-01-01 ENCOUNTER — MEDICAL CORRESPONDENCE (OUTPATIENT)
Dept: HEALTH INFORMATION MANAGEMENT | Facility: CLINIC | Age: 87
End: 2022-01-01
Payer: MEDICARE

## 2022-01-01 ENCOUNTER — APPOINTMENT (OUTPATIENT)
Dept: PHYSICAL THERAPY | Facility: CLINIC | Age: 87
DRG: 178 | End: 2022-01-01
Payer: MEDICARE

## 2022-01-01 ENCOUNTER — MEDICAL CORRESPONDENCE (OUTPATIENT)
Dept: HEALTH INFORMATION MANAGEMENT | Facility: CLINIC | Age: 87
End: 2022-01-01

## 2022-01-01 ENCOUNTER — LAB (OUTPATIENT)
Dept: LAB | Facility: CLINIC | Age: 87
End: 2022-01-01
Payer: MEDICARE

## 2022-01-01 ENCOUNTER — VIRTUAL VISIT (OUTPATIENT)
Dept: FAMILY MEDICINE | Facility: CLINIC | Age: 87
End: 2022-01-01
Payer: OTHER MISCELLANEOUS

## 2022-01-01 ENCOUNTER — PATIENT OUTREACH (OUTPATIENT)
Dept: CARE COORDINATION | Facility: CLINIC | Age: 87
End: 2022-01-01

## 2022-01-01 ENCOUNTER — ANCILLARY PROCEDURE (OUTPATIENT)
Dept: CARDIOLOGY | Facility: CLINIC | Age: 87
End: 2022-01-01
Attending: INTERNAL MEDICINE
Payer: MEDICARE

## 2022-01-01 ENCOUNTER — TELEPHONE (OUTPATIENT)
Dept: FAMILY MEDICINE | Facility: CLINIC | Age: 87
End: 2022-01-01

## 2022-01-01 ENCOUNTER — TELEPHONE (OUTPATIENT)
Dept: FAMILY MEDICINE | Facility: CLINIC | Age: 87
End: 2022-01-01
Payer: MEDICARE

## 2022-01-01 ENCOUNTER — ANTICOAGULATION THERAPY VISIT (OUTPATIENT)
Dept: FAMILY MEDICINE | Facility: CLINIC | Age: 87
End: 2022-01-01
Payer: MEDICARE

## 2022-01-01 ENCOUNTER — HOSPITAL ENCOUNTER (INPATIENT)
Facility: CLINIC | Age: 87
LOS: 6 days | Discharge: HOME | DRG: 178 | End: 2022-08-27
Attending: EMERGENCY MEDICINE | Admitting: INTERNAL MEDICINE
Payer: MEDICARE

## 2022-01-01 ENCOUNTER — TELEPHONE (OUTPATIENT)
Dept: ANTICOAGULATION | Facility: CLINIC | Age: 87
End: 2022-01-01

## 2022-01-01 ENCOUNTER — APPOINTMENT (OUTPATIENT)
Dept: GENERAL RADIOLOGY | Facility: CLINIC | Age: 87
DRG: 178 | End: 2022-01-01
Attending: EMERGENCY MEDICINE
Payer: MEDICARE

## 2022-01-01 ENCOUNTER — VIRTUAL VISIT (OUTPATIENT)
Dept: FAMILY MEDICINE | Facility: CLINIC | Age: 87
End: 2022-01-01
Payer: MEDICARE

## 2022-01-01 ENCOUNTER — DOCUMENTATION ONLY (OUTPATIENT)
Dept: ANTICOAGULATION | Facility: CLINIC | Age: 87
End: 2022-01-01

## 2022-01-01 ENCOUNTER — MEDICAL CORRESPONDENCE (OUTPATIENT)
Dept: FAMILY MEDICINE | Facility: CLINIC | Age: 87
End: 2022-01-01

## 2022-01-01 ENCOUNTER — OFFICE VISIT (OUTPATIENT)
Dept: FAMILY MEDICINE | Facility: CLINIC | Age: 87
End: 2022-01-01
Payer: MEDICARE

## 2022-01-01 ENCOUNTER — HEALTH MAINTENANCE LETTER (OUTPATIENT)
Age: 87
End: 2022-01-01

## 2022-01-01 ENCOUNTER — APPOINTMENT (OUTPATIENT)
Dept: PHYSICAL THERAPY | Facility: CLINIC | Age: 87
DRG: 178 | End: 2022-01-01
Attending: INTERNAL MEDICINE
Payer: MEDICARE

## 2022-01-01 ENCOUNTER — APPOINTMENT (OUTPATIENT)
Dept: OCCUPATIONAL THERAPY | Facility: CLINIC | Age: 87
DRG: 178 | End: 2022-01-01
Attending: INTERNAL MEDICINE
Payer: MEDICARE

## 2022-01-01 VITALS
HEART RATE: 71 BPM | RESPIRATION RATE: 18 BRPM | BODY MASS INDEX: 23.59 KG/M2 | WEIGHT: 133.16 LBS | DIASTOLIC BLOOD PRESSURE: 70 MMHG | OXYGEN SATURATION: 96 % | TEMPERATURE: 97.3 F | SYSTOLIC BLOOD PRESSURE: 142 MMHG | HEIGHT: 63 IN

## 2022-01-01 VITALS
DIASTOLIC BLOOD PRESSURE: 83 MMHG | RESPIRATION RATE: 16 BRPM | HEART RATE: 75 BPM | BODY MASS INDEX: 20.83 KG/M2 | OXYGEN SATURATION: 97 % | HEIGHT: 65 IN | SYSTOLIC BLOOD PRESSURE: 130 MMHG | WEIGHT: 125 LBS

## 2022-01-01 VITALS
TEMPERATURE: 97.2 F | HEART RATE: 71 BPM | BODY MASS INDEX: 25.83 KG/M2 | DIASTOLIC BLOOD PRESSURE: 71 MMHG | OXYGEN SATURATION: 98 % | WEIGHT: 155 LBS | HEIGHT: 65 IN | SYSTOLIC BLOOD PRESSURE: 128 MMHG | RESPIRATION RATE: 16 BRPM

## 2022-01-01 VITALS
SYSTOLIC BLOOD PRESSURE: 121 MMHG | HEART RATE: 73 BPM | DIASTOLIC BLOOD PRESSURE: 68 MMHG | HEIGHT: 65 IN | WEIGHT: 142.1 LBS | OXYGEN SATURATION: 100 % | BODY MASS INDEX: 23.68 KG/M2

## 2022-01-01 DIAGNOSIS — I48.0 PAROXYSMAL ATRIAL FIBRILLATION (H): ICD-10-CM

## 2022-01-01 DIAGNOSIS — Z79.01 LONG TERM CURRENT USE OF ANTICOAGULANT THERAPY: Primary | ICD-10-CM

## 2022-01-01 DIAGNOSIS — C90.00 MULTIPLE MYELOMA NOT HAVING ACHIEVED REMISSION (H): ICD-10-CM

## 2022-01-01 DIAGNOSIS — Z13.220 SCREENING FOR HYPERLIPIDEMIA: ICD-10-CM

## 2022-01-01 DIAGNOSIS — C79.51 CANCER, METASTATIC TO BONE (H): ICD-10-CM

## 2022-01-01 DIAGNOSIS — Z53.9 DIAGNOSIS NOT YET DEFINED: Primary | ICD-10-CM

## 2022-01-01 DIAGNOSIS — M54.50 CHRONIC LOW BACK PAIN, UNSPECIFIED BACK PAIN LATERALITY, UNSPECIFIED WHETHER SCIATICA PRESENT: ICD-10-CM

## 2022-01-01 DIAGNOSIS — I48.0 PAROXYSMAL ATRIAL FIBRILLATION (H): Primary | ICD-10-CM

## 2022-01-01 DIAGNOSIS — Z79.899 ENCOUNTER FOR LONG-TERM (CURRENT) USE OF MEDICATIONS: ICD-10-CM

## 2022-01-01 DIAGNOSIS — R53.83 LETHARGY: ICD-10-CM

## 2022-01-01 DIAGNOSIS — I50.32 CHRONIC DIASTOLIC HEART FAILURE (H): ICD-10-CM

## 2022-01-01 DIAGNOSIS — M62.81 GENERALIZED MUSCLE WEAKNESS: ICD-10-CM

## 2022-01-01 DIAGNOSIS — I77.810 ASCENDING AORTA DILATATION (H): ICD-10-CM

## 2022-01-01 DIAGNOSIS — E43 UNSPECIFIED SEVERE PROTEIN-CALORIE MALNUTRITION (H): Primary | ICD-10-CM

## 2022-01-01 DIAGNOSIS — I48.91 ATRIAL FIBRILLATION, UNSPECIFIED TYPE (H): ICD-10-CM

## 2022-01-01 DIAGNOSIS — U07.1 INFECTION DUE TO 2019 NOVEL CORONAVIRUS: ICD-10-CM

## 2022-01-01 DIAGNOSIS — I07.1 TRICUSPID VALVE INSUFFICIENCY, UNSPECIFIED ETIOLOGY: ICD-10-CM

## 2022-01-01 DIAGNOSIS — I44.2 COMPLETE HEART BLOCK (H): ICD-10-CM

## 2022-01-01 DIAGNOSIS — R63.4 WEIGHT LOSS: Primary | ICD-10-CM

## 2022-01-01 DIAGNOSIS — D69.6 THROMBOCYTOPENIA (H): ICD-10-CM

## 2022-01-01 DIAGNOSIS — G89.29 CHRONIC LOW BACK PAIN, UNSPECIFIED BACK PAIN LATERALITY, UNSPECIFIED WHETHER SCIATICA PRESENT: Primary | ICD-10-CM

## 2022-01-01 DIAGNOSIS — I48.91 ATRIAL FIBRILLATION, UNSPECIFIED TYPE (H): Primary | ICD-10-CM

## 2022-01-01 DIAGNOSIS — R42 DIZZINESS: ICD-10-CM

## 2022-01-01 DIAGNOSIS — G62.0 DRUG-INDUCED POLYNEUROPATHY (H): ICD-10-CM

## 2022-01-01 DIAGNOSIS — M25.512 ACUTE PAIN OF LEFT SHOULDER: Primary | ICD-10-CM

## 2022-01-01 DIAGNOSIS — Z95.0 CARDIAC PACEMAKER IN SITU: ICD-10-CM

## 2022-01-01 DIAGNOSIS — G89.29 CHRONIC LOW BACK PAIN, UNSPECIFIED BACK PAIN LATERALITY, UNSPECIFIED WHETHER SCIATICA PRESENT: ICD-10-CM

## 2022-01-01 DIAGNOSIS — Z98.890 HX OF ATRIOVENTRICULAR NODE ABLATION: ICD-10-CM

## 2022-01-01 DIAGNOSIS — I10 ESSENTIAL HYPERTENSION: ICD-10-CM

## 2022-01-01 DIAGNOSIS — I34.0 MITRAL VALVE INSUFFICIENCY, UNSPECIFIED ETIOLOGY: ICD-10-CM

## 2022-01-01 DIAGNOSIS — R82.90 ABNORMAL URINALYSIS: ICD-10-CM

## 2022-01-01 DIAGNOSIS — I27.20 PULMONARY HYPERTENSION (H): ICD-10-CM

## 2022-01-01 DIAGNOSIS — E86.0 DEHYDRATION: ICD-10-CM

## 2022-01-01 DIAGNOSIS — M54.50 CHRONIC LOW BACK PAIN, UNSPECIFIED BACK PAIN LATERALITY, UNSPECIFIED WHETHER SCIATICA PRESENT: Primary | ICD-10-CM

## 2022-01-01 DIAGNOSIS — E43 UNSPECIFIED SEVERE PROTEIN-CALORIE MALNUTRITION (H): ICD-10-CM

## 2022-01-01 LAB
ALBUMIN SERPL-MCNC: 3.3 G/DL (ref 3.4–5)
ALBUMIN SERPL-MCNC: 3.4 G/DL (ref 3.4–5)
ALBUMIN SERPL-MCNC: 3.6 G/DL (ref 3.4–5)
ALBUMIN UR-MCNC: 50 MG/DL
ALP SERPL-CCNC: 45 U/L (ref 40–150)
ALP SERPL-CCNC: 47 U/L (ref 40–150)
ALP SERPL-CCNC: 86 U/L (ref 40–150)
ALT SERPL W P-5'-P-CCNC: 14 U/L (ref 0–50)
ALT SERPL W P-5'-P-CCNC: 15 U/L (ref 0–50)
ALT SERPL W P-5'-P-CCNC: 17 U/L (ref 0–50)
ANION GAP SERPL CALCULATED.3IONS-SCNC: 7 MMOL/L (ref 3–14)
ANION GAP SERPL CALCULATED.3IONS-SCNC: 8 MMOL/L (ref 3–14)
ANION GAP SERPL CALCULATED.3IONS-SCNC: 8 MMOL/L (ref 3–14)
APPEARANCE UR: CLEAR
AST SERPL W P-5'-P-CCNC: 25 U/L (ref 0–45)
AST SERPL W P-5'-P-CCNC: 27 U/L (ref 0–45)
AST SERPL W P-5'-P-CCNC: 29 U/L (ref 0–45)
ATRIAL RATE - MUSE: 468 BPM
BACTERIA #/AREA URNS HPF: ABNORMAL /HPF
BACTERIA UR CULT: ABNORMAL
BASOPHILS # BLD AUTO: 0 10E3/UL (ref 0–0.2)
BASOPHILS # BLD AUTO: 0 10E3/UL (ref 0–0.2)
BASOPHILS NFR BLD AUTO: 0 %
BASOPHILS NFR BLD AUTO: 0 %
BILIRUB SERPL-MCNC: 0.5 MG/DL (ref 0.2–1.3)
BILIRUB SERPL-MCNC: 0.6 MG/DL (ref 0.2–1.3)
BILIRUB SERPL-MCNC: 0.8 MG/DL (ref 0.2–1.3)
BILIRUB UR QL STRIP: NEGATIVE
BUN SERPL-MCNC: 18 MG/DL (ref 7–30)
BUN SERPL-MCNC: 19 MG/DL (ref 7–30)
BUN SERPL-MCNC: 20 MG/DL (ref 7–30)
BUN SERPL-MCNC: 25 MG/DL (ref 7–30)
BUN SERPL-MCNC: 9 MG/DL (ref 7–30)
BUN SERPL-MCNC: 9 MG/DL (ref 7–30)
CALCIUM SERPL-MCNC: 10 MG/DL (ref 8.5–10.1)
CALCIUM SERPL-MCNC: 8.9 MG/DL (ref 8.5–10.1)
CALCIUM SERPL-MCNC: 9 MG/DL (ref 8.5–10.1)
CALCIUM SERPL-MCNC: 9.6 MG/DL (ref 8.5–10.1)
CALCIUM SERPL-MCNC: 9.8 MG/DL (ref 8.5–10.1)
CALCIUM SERPL-MCNC: 9.8 MG/DL (ref 8.5–10.1)
CHLORIDE BLD-SCNC: 108 MMOL/L (ref 94–109)
CHLORIDE BLD-SCNC: 109 MMOL/L (ref 94–109)
CHLORIDE BLD-SCNC: 110 MMOL/L (ref 94–109)
CHLORIDE BLD-SCNC: 111 MMOL/L (ref 94–109)
CHLORIDE BLD-SCNC: 112 MMOL/L (ref 94–109)
CHLORIDE BLD-SCNC: 113 MMOL/L (ref 94–109)
CO2 SERPL-SCNC: 20 MMOL/L (ref 20–32)
CO2 SERPL-SCNC: 22 MMOL/L (ref 20–32)
CO2 SERPL-SCNC: 22 MMOL/L (ref 20–32)
CO2 SERPL-SCNC: 24 MMOL/L (ref 20–32)
COLOR UR AUTO: YELLOW
CREAT SERPL-MCNC: 0.52 MG/DL (ref 0.52–1.04)
CREAT SERPL-MCNC: 0.57 MG/DL (ref 0.52–1.04)
CREAT SERPL-MCNC: 0.58 MG/DL (ref 0.52–1.04)
CREAT SERPL-MCNC: 0.68 MG/DL (ref 0.52–1.04)
CREAT SERPL-MCNC: 0.88 MG/DL (ref 0.52–1.04)
CREAT SERPL-MCNC: 0.96 MG/DL (ref 0.52–1.04)
CRP SERPL-MCNC: 13 MG/L (ref 0–8)
CRP SERPL-MCNC: 24.2 MG/L (ref 0–8)
CRP SERPL-MCNC: 24.6 MG/L (ref 0–8)
CRP SERPL-MCNC: 29.2 MG/L (ref 0–8)
D DIMER PPP FEU-MCNC: 0.62 UG/ML FEU (ref 0–0.5)
D DIMER PPP FEU-MCNC: 0.8 UG/ML FEU (ref 0–0.5)
D DIMER PPP FEU-MCNC: 1.34 UG/ML FEU (ref 0–0.5)
DIASTOLIC BLOOD PRESSURE - MUSE: NORMAL MMHG
EOSINOPHIL # BLD AUTO: 0 10E3/UL (ref 0–0.7)
EOSINOPHIL # BLD AUTO: 0 10E3/UL (ref 0–0.7)
EOSINOPHIL NFR BLD AUTO: 0 %
EOSINOPHIL NFR BLD AUTO: 0 %
ERYTHROCYTE [DISTWIDTH] IN BLOOD BY AUTOMATED COUNT: 18.4 % (ref 10–15)
ERYTHROCYTE [DISTWIDTH] IN BLOOD BY AUTOMATED COUNT: 20 % (ref 10–15)
ERYTHROCYTE [DISTWIDTH] IN BLOOD BY AUTOMATED COUNT: 20.2 % (ref 10–15)
ERYTHROCYTE [DISTWIDTH] IN BLOOD BY AUTOMATED COUNT: 20.3 % (ref 10–15)
ERYTHROCYTE [DISTWIDTH] IN BLOOD BY AUTOMATED COUNT: 20.5 % (ref 10–15)
ERYTHROCYTE [SEDIMENTATION RATE] IN BLOOD BY WESTERGREN METHOD: 21 MM/HR (ref 0–30)
FLUAV RNA SPEC QL NAA+PROBE: NEGATIVE
FLUBV RNA RESP QL NAA+PROBE: NEGATIVE
GFR SERPL CREATININE-BSD FRML MDRD: 56 ML/MIN/1.73M2
GFR SERPL CREATININE-BSD FRML MDRD: 62 ML/MIN/1.73M2
GFR SERPL CREATININE-BSD FRML MDRD: 82 ML/MIN/1.73M2
GFR SERPL CREATININE-BSD FRML MDRD: 85 ML/MIN/1.73M2
GFR SERPL CREATININE-BSD FRML MDRD: 86 ML/MIN/1.73M2
GFR SERPL CREATININE-BSD FRML MDRD: 88 ML/MIN/1.73M2
GLUCOSE BLD-MCNC: 109 MG/DL (ref 70–99)
GLUCOSE BLD-MCNC: 110 MG/DL (ref 70–99)
GLUCOSE BLD-MCNC: 120 MG/DL (ref 70–99)
GLUCOSE BLD-MCNC: 124 MG/DL (ref 70–99)
GLUCOSE BLD-MCNC: 92 MG/DL (ref 70–99)
GLUCOSE BLD-MCNC: 92 MG/DL (ref 70–99)
GLUCOSE UR STRIP-MCNC: NEGATIVE MG/DL
HCO3 BLDV-SCNC: 20 MMOL/L (ref 21–28)
HCT VFR BLD AUTO: 32.6 % (ref 35–47)
HCT VFR BLD AUTO: 34.6 % (ref 35–47)
HCT VFR BLD AUTO: 34.7 % (ref 35–47)
HCT VFR BLD AUTO: 35.9 % (ref 35–47)
HCT VFR BLD AUTO: 36.2 % (ref 35–47)
HGB BLD-MCNC: 10.3 G/DL (ref 11.7–15.7)
HGB BLD-MCNC: 11.1 G/DL (ref 11.7–15.7)
HGB BLD-MCNC: 11.2 G/DL (ref 11.7–15.7)
HGB BLD-MCNC: 11.3 G/DL (ref 11.7–15.7)
HGB BLD-MCNC: 11.4 G/DL (ref 11.7–15.7)
HGB UR QL STRIP: NEGATIVE
HOLD SPECIMEN: NORMAL
HYALINE CASTS: 1 /LPF
IMM GRANULOCYTES # BLD: 0 10E3/UL
IMM GRANULOCYTES # BLD: 0 10E3/UL
IMM GRANULOCYTES NFR BLD: 0 %
IMM GRANULOCYTES NFR BLD: 1 %
INR (EXTERNAL): 1.6 (ref 0.9–1.1)
INR (EXTERNAL): 1.8 (ref 0.9–1.1)
INR (EXTERNAL): 2.2 (ref 0.9–1.1)
INR (EXTERNAL): 2.5 (ref 0.9–1.1)
INR (EXTERNAL): 2.6 (ref 0.9–1.1)
INR (EXTERNAL): 2.7 (ref 0.9–1.1)
INR (EXTERNAL): 3.1 (ref 0.9–1.1)
INR (EXTERNAL): 3.1 (ref 0.9–1.1)
INR (EXTERNAL): 3.3 (ref 0.9–1.1)
INR (EXTERNAL): 3.5 (ref 0.9–1.1)
INR (EXTERNAL): 3.8 (ref 0.9–1.1)
INR (EXTERNAL): 3.8 (ref 0.9–1.1)
INR PPP: 1.68 (ref 0.85–1.15)
INR PPP: 2.09 (ref 0.85–1.15)
INR PPP: 2.43 (ref 0.85–1.15)
INR PPP: 2.51 (ref 0.85–1.15)
INR PPP: 2.9 (ref 0.85–1.15)
INR PPP: 2.91 (ref 0.85–1.15)
INTERPRETATION ECG - MUSE: NORMAL
KETONES UR STRIP-MCNC: 10 MG/DL
LACTATE BLD-SCNC: 0.7 MMOL/L
LEUKOCYTE ESTERASE UR QL STRIP: ABNORMAL
LYMPHOCYTES # BLD AUTO: 0.3 10E3/UL (ref 0.8–5.3)
LYMPHOCYTES # BLD AUTO: 0.5 10E3/UL (ref 0.8–5.3)
LYMPHOCYTES NFR BLD AUTO: 10 %
LYMPHOCYTES NFR BLD AUTO: 7 %
MAGNESIUM SERPL-MCNC: 1.9 MG/DL (ref 1.6–2.3)
MAGNESIUM SERPL-MCNC: 2.1 MG/DL (ref 1.6–2.3)
MCH RBC QN AUTO: 26.5 PG (ref 26.5–33)
MCH RBC QN AUTO: 29 PG (ref 26.5–33)
MCH RBC QN AUTO: 29 PG (ref 26.5–33)
MCH RBC QN AUTO: 29.1 PG (ref 26.5–33)
MCH RBC QN AUTO: 29.2 PG (ref 26.5–33)
MCHC RBC AUTO-ENTMCNC: 30.9 G/DL (ref 31.5–36.5)
MCHC RBC AUTO-ENTMCNC: 31.6 G/DL (ref 31.5–36.5)
MCHC RBC AUTO-ENTMCNC: 31.8 G/DL (ref 31.5–36.5)
MCHC RBC AUTO-ENTMCNC: 32 G/DL (ref 31.5–36.5)
MCHC RBC AUTO-ENTMCNC: 32.7 G/DL (ref 31.5–36.5)
MCV RBC AUTO: 86 FL (ref 78–100)
MCV RBC AUTO: 89 FL (ref 78–100)
MCV RBC AUTO: 91 FL (ref 78–100)
MCV RBC AUTO: 92 FL (ref 78–100)
MCV RBC AUTO: 92 FL (ref 78–100)
MDC_IDC_EPISODE_DTM: NORMAL
MDC_IDC_EPISODE_DTM: NORMAL
MDC_IDC_EPISODE_ID: NORMAL
MDC_IDC_EPISODE_ID: NORMAL
MDC_IDC_EPISODE_TYPE: NORMAL
MDC_IDC_EPISODE_TYPE: NORMAL
MDC_IDC_LEAD_IMPLANT_DT: NORMAL
MDC_IDC_LEAD_IMPLANT_DT: NORMAL
MDC_IDC_LEAD_LOCATION: NORMAL
MDC_IDC_LEAD_LOCATION: NORMAL
MDC_IDC_LEAD_LOCATION_DETAIL_1: NORMAL
MDC_IDC_LEAD_LOCATION_DETAIL_1: NORMAL
MDC_IDC_LEAD_MFG: NORMAL
MDC_IDC_LEAD_MFG: NORMAL
MDC_IDC_LEAD_MODEL: NORMAL
MDC_IDC_LEAD_MODEL: NORMAL
MDC_IDC_LEAD_POLARITY_TYPE: NORMAL
MDC_IDC_LEAD_POLARITY_TYPE: NORMAL
MDC_IDC_LEAD_SERIAL: NORMAL
MDC_IDC_LEAD_SERIAL: NORMAL
MDC_IDC_MSMT_BATTERY_DTM: NORMAL
MDC_IDC_MSMT_BATTERY_DTM: NORMAL
MDC_IDC_MSMT_BATTERY_REMAINING_LONGEVITY: 90 MO
MDC_IDC_MSMT_BATTERY_REMAINING_LONGEVITY: 96 MO
MDC_IDC_MSMT_BATTERY_REMAINING_PERCENTAGE: 100 %
MDC_IDC_MSMT_BATTERY_REMAINING_PERCENTAGE: 100 %
MDC_IDC_MSMT_BATTERY_STATUS: NORMAL
MDC_IDC_MSMT_BATTERY_STATUS: NORMAL
MDC_IDC_MSMT_LEADCHNL_RV_IMPEDANCE_VALUE: 774 OHM
MDC_IDC_MSMT_LEADCHNL_RV_IMPEDANCE_VALUE: 781 OHM
MDC_IDC_MSMT_LEADCHNL_RV_PACING_THRESHOLD_AMPLITUDE: 0.7 V
MDC_IDC_MSMT_LEADCHNL_RV_PACING_THRESHOLD_AMPLITUDE: 0.7 V
MDC_IDC_MSMT_LEADCHNL_RV_PACING_THRESHOLD_PULSEWIDTH: 0.4 MS
MDC_IDC_MSMT_LEADCHNL_RV_PACING_THRESHOLD_PULSEWIDTH: 0.4 MS
MDC_IDC_PG_IMPLANT_DTM: NORMAL
MDC_IDC_PG_IMPLANT_DTM: NORMAL
MDC_IDC_PG_MFG: NORMAL
MDC_IDC_PG_MFG: NORMAL
MDC_IDC_PG_MODEL: NORMAL
MDC_IDC_PG_MODEL: NORMAL
MDC_IDC_PG_SERIAL: NORMAL
MDC_IDC_PG_SERIAL: NORMAL
MDC_IDC_PG_TYPE: NORMAL
MDC_IDC_PG_TYPE: NORMAL
MDC_IDC_SESS_CLINIC_NAME: NORMAL
MDC_IDC_SESS_CLINIC_NAME: NORMAL
MDC_IDC_SESS_DTM: NORMAL
MDC_IDC_SESS_DTM: NORMAL
MDC_IDC_SESS_TYPE: NORMAL
MDC_IDC_SESS_TYPE: NORMAL
MDC_IDC_SET_BRADY_LOWRATE: 70 {BEATS}/MIN
MDC_IDC_SET_BRADY_LOWRATE: 70 {BEATS}/MIN
MDC_IDC_SET_BRADY_MAX_SENSOR_RATE: 130 {BEATS}/MIN
MDC_IDC_SET_BRADY_MAX_SENSOR_RATE: 130 {BEATS}/MIN
MDC_IDC_SET_BRADY_MODE: NORMAL
MDC_IDC_SET_BRADY_MODE: NORMAL
MDC_IDC_SET_LEADCHNL_RV_PACING_AMPLITUDE: 1.2 V
MDC_IDC_SET_LEADCHNL_RV_PACING_AMPLITUDE: 1.2 V
MDC_IDC_SET_LEADCHNL_RV_PACING_CAPTURE_MODE: NORMAL
MDC_IDC_SET_LEADCHNL_RV_PACING_CAPTURE_MODE: NORMAL
MDC_IDC_SET_LEADCHNL_RV_PACING_POLARITY: NORMAL
MDC_IDC_SET_LEADCHNL_RV_PACING_POLARITY: NORMAL
MDC_IDC_SET_LEADCHNL_RV_PACING_PULSEWIDTH: 0.4 MS
MDC_IDC_SET_LEADCHNL_RV_PACING_PULSEWIDTH: 0.4 MS
MDC_IDC_SET_LEADCHNL_RV_SENSING_ADAPTATION_MODE: NORMAL
MDC_IDC_SET_LEADCHNL_RV_SENSING_ADAPTATION_MODE: NORMAL
MDC_IDC_SET_LEADCHNL_RV_SENSING_POLARITY: NORMAL
MDC_IDC_SET_LEADCHNL_RV_SENSING_POLARITY: NORMAL
MDC_IDC_SET_LEADCHNL_RV_SENSING_SENSITIVITY: 2.5 MV
MDC_IDC_SET_LEADCHNL_RV_SENSING_SENSITIVITY: 2.5 MV
MDC_IDC_SET_ZONE_DETECTION_INTERVAL: 375 MS
MDC_IDC_SET_ZONE_DETECTION_INTERVAL: 375 MS
MDC_IDC_SET_ZONE_TYPE: NORMAL
MDC_IDC_SET_ZONE_TYPE: NORMAL
MDC_IDC_SET_ZONE_VENDOR_TYPE: NORMAL
MDC_IDC_SET_ZONE_VENDOR_TYPE: NORMAL
MDC_IDC_STAT_BRADY_DTM_END: NORMAL
MDC_IDC_STAT_BRADY_DTM_END: NORMAL
MDC_IDC_STAT_BRADY_DTM_START: NORMAL
MDC_IDC_STAT_BRADY_DTM_START: NORMAL
MDC_IDC_STAT_BRADY_RV_PERCENT_PACED: 100 %
MDC_IDC_STAT_BRADY_RV_PERCENT_PACED: 99 %
MDC_IDC_STAT_EPISODE_RECENT_COUNT: 0
MDC_IDC_STAT_EPISODE_RECENT_COUNT_DTM_END: NORMAL
MDC_IDC_STAT_EPISODE_RECENT_COUNT_DTM_START: NORMAL
MDC_IDC_STAT_EPISODE_TYPE: NORMAL
MDC_IDC_STAT_EPISODE_VENDOR_TYPE: NORMAL
MONOCYTES # BLD AUTO: 0.8 10E3/UL (ref 0–1.3)
MONOCYTES # BLD AUTO: 1 10E3/UL (ref 0–1.3)
MONOCYTES NFR BLD AUTO: 17 %
MONOCYTES NFR BLD AUTO: 18 %
MUCOUS THREADS #/AREA URNS LPF: PRESENT /LPF
NEUTROPHILS # BLD AUTO: 3.1 10E3/UL (ref 1.6–8.3)
NEUTROPHILS # BLD AUTO: 4.1 10E3/UL (ref 1.6–8.3)
NEUTROPHILS NFR BLD AUTO: 73 %
NEUTROPHILS NFR BLD AUTO: 74 %
NITRATE UR QL: NEGATIVE
NRBC # BLD AUTO: 0 10E3/UL
NRBC # BLD AUTO: 0 10E3/UL
NRBC BLD AUTO-RTO: 0 /100
NRBC BLD AUTO-RTO: 0 /100
P AXIS - MUSE: NORMAL DEGREES
PCO2 BLDV: 30 MM HG (ref 40–50)
PH BLDV: 7.42 [PH] (ref 7.32–7.43)
PH UR STRIP: 5.5 [PH] (ref 5–7)
PLATELET # BLD AUTO: 149 10E3/UL (ref 150–450)
PLATELET # BLD AUTO: 167 10E3/UL (ref 150–450)
PLATELET # BLD AUTO: 178 10E3/UL (ref 150–450)
PLATELET # BLD AUTO: 186 10E3/UL (ref 150–450)
PLATELET # BLD AUTO: 268 10E3/UL (ref 150–450)
PO2 BLDV: 47 MM HG (ref 25–47)
POTASSIUM BLD-SCNC: 3.6 MMOL/L (ref 3.4–5.3)
POTASSIUM BLD-SCNC: 3.9 MMOL/L (ref 3.4–5.3)
POTASSIUM BLD-SCNC: 3.9 MMOL/L (ref 3.4–5.3)
POTASSIUM BLD-SCNC: 4 MMOL/L (ref 3.4–5.3)
POTASSIUM BLD-SCNC: 4 MMOL/L (ref 3.4–5.3)
POTASSIUM BLD-SCNC: 4.5 MMOL/L (ref 3.4–5.3)
POTASSIUM BLD-SCNC: 4.5 MMOL/L (ref 3.4–5.3)
PR INTERVAL - MUSE: NORMAL MS
PROT SERPL-MCNC: 6.1 G/DL (ref 6.8–8.8)
PROT SERPL-MCNC: 6.3 G/DL (ref 6.8–8.8)
PROT SERPL-MCNC: 6.5 G/DL (ref 6.8–8.8)
QRS DURATION - MUSE: 136 MS
QT - MUSE: 458 MS
QTC - MUSE: 494 MS
R AXIS - MUSE: -43 DEGREES
RBC # BLD AUTO: 3.55 10E6/UL (ref 3.8–5.2)
RBC # BLD AUTO: 3.83 10E6/UL (ref 3.8–5.2)
RBC # BLD AUTO: 3.87 10E6/UL (ref 3.8–5.2)
RBC # BLD AUTO: 3.92 10E6/UL (ref 3.8–5.2)
RBC # BLD AUTO: 4.22 10E6/UL (ref 3.8–5.2)
RBC URINE: 3 /HPF
RSV RNA SPEC NAA+PROBE: NEGATIVE
SAO2 % BLDV: 84 % (ref 94–100)
SARS-COV-2 RNA RESP QL NAA+PROBE: POSITIVE
SODIUM SERPL-SCNC: 137 MMOL/L (ref 133–144)
SODIUM SERPL-SCNC: 139 MMOL/L (ref 133–144)
SODIUM SERPL-SCNC: 139 MMOL/L (ref 133–144)
SODIUM SERPL-SCNC: 140 MMOL/L (ref 133–144)
SP GR UR STRIP: 1.02 (ref 1–1.03)
SQUAMOUS EPITHELIAL: <1 /HPF
SYSTOLIC BLOOD PRESSURE - MUSE: NORMAL MMHG
T AXIS - MUSE: 42 DEGREES
TSH SERPL DL<=0.005 MIU/L-ACNC: 1.68 MU/L (ref 0.4–4)
UROBILINOGEN UR STRIP-MCNC: NORMAL MG/DL
VENTRICULAR RATE- MUSE: 70 BPM
WBC # BLD AUTO: 4.1 10E3/UL (ref 4–11)
WBC # BLD AUTO: 4.2 10E3/UL (ref 4–11)
WBC # BLD AUTO: 4.7 10E3/UL (ref 4–11)
WBC # BLD AUTO: 5.6 10E3/UL (ref 4–11)
WBC # BLD AUTO: 6.8 10E3/UL (ref 4–11)
WBC URINE: 12 /HPF

## 2022-01-01 PROCEDURE — 99285 EMERGENCY DEPT VISIT HI MDM: CPT | Mod: 25

## 2022-01-01 PROCEDURE — 99232 SBSQ HOSP IP/OBS MODERATE 35: CPT | Performed by: INTERNAL MEDICINE

## 2022-01-01 PROCEDURE — 36415 COLL VENOUS BLD VENIPUNCTURE: CPT | Performed by: INTERNAL MEDICINE

## 2022-01-01 PROCEDURE — 97530 THERAPEUTIC ACTIVITIES: CPT | Mod: GP

## 2022-01-01 PROCEDURE — 85004 AUTOMATED DIFF WBC COUNT: CPT | Performed by: INTERNAL MEDICINE

## 2022-01-01 PROCEDURE — 250N000011 HC RX IP 250 OP 636: Performed by: INTERNAL MEDICINE

## 2022-01-01 PROCEDURE — 250N000013 HC RX MED GY IP 250 OP 250 PS 637: Performed by: INTERNAL MEDICINE

## 2022-01-01 PROCEDURE — 86140 C-REACTIVE PROTEIN: CPT | Performed by: INTERNAL MEDICINE

## 2022-01-01 PROCEDURE — 85379 FIBRIN DEGRADATION QUANT: CPT | Performed by: INTERNAL MEDICINE

## 2022-01-01 PROCEDURE — 85610 PROTHROMBIN TIME: CPT | Performed by: INTERNAL MEDICINE

## 2022-01-01 PROCEDURE — 93005 ELECTROCARDIOGRAM TRACING: CPT

## 2022-01-01 PROCEDURE — 97161 PT EVAL LOW COMPLEX 20 MIN: CPT | Mod: GP

## 2022-01-01 PROCEDURE — 85018 HEMOGLOBIN: CPT | Performed by: INTERNAL MEDICINE

## 2022-01-01 PROCEDURE — C9803 HOPD COVID-19 SPEC COLLECT: HCPCS

## 2022-01-01 PROCEDURE — 93294 REM INTERROG EVL PM/LDLS PM: CPT | Performed by: INTERNAL MEDICINE

## 2022-01-01 PROCEDURE — 120N000001 HC R&B MED SURG/OB

## 2022-01-01 PROCEDURE — 258N000003 HC RX IP 258 OP 636: Performed by: INTERNAL MEDICINE

## 2022-01-01 PROCEDURE — 97530 THERAPEUTIC ACTIVITIES: CPT | Mod: GO

## 2022-01-01 PROCEDURE — 96365 THER/PROPH/DIAG IV INF INIT: CPT

## 2022-01-01 PROCEDURE — 83735 ASSAY OF MAGNESIUM: CPT | Performed by: INTERNAL MEDICINE

## 2022-01-01 PROCEDURE — 250N000011 HC RX IP 250 OP 636: Performed by: EMERGENCY MEDICINE

## 2022-01-01 PROCEDURE — 36415 COLL VENOUS BLD VENIPUNCTURE: CPT | Performed by: NURSE PRACTITIONER

## 2022-01-01 PROCEDURE — 99207 PR MD RECERTIFICATION HHA PT: CPT | Performed by: INTERNAL MEDICINE

## 2022-01-01 PROCEDURE — 99213 OFFICE O/P EST LOW 20 MIN: CPT | Mod: 95 | Performed by: INTERNAL MEDICINE

## 2022-01-01 PROCEDURE — 80053 COMPREHEN METABOLIC PANEL: CPT | Performed by: EMERGENCY MEDICINE

## 2022-01-01 PROCEDURE — G0180 MD CERTIFICATION HHA PATIENT: HCPCS | Performed by: INTERNAL MEDICINE

## 2022-01-01 PROCEDURE — 82803 BLOOD GASES ANY COMBINATION: CPT

## 2022-01-01 PROCEDURE — XW033E5 INTRODUCTION OF REMDESIVIR ANTI-INFECTIVE INTO PERIPHERAL VEIN, PERCUTANEOUS APPROACH, NEW TECHNOLOGY GROUP 5: ICD-10-PCS | Performed by: INTERNAL MEDICINE

## 2022-01-01 PROCEDURE — 80053 COMPREHEN METABOLIC PANEL: CPT | Performed by: INTERNAL MEDICINE

## 2022-01-01 PROCEDURE — 96375 TX/PRO/DX INJ NEW DRUG ADDON: CPT

## 2022-01-01 PROCEDURE — 96361 HYDRATE IV INFUSION ADD-ON: CPT

## 2022-01-01 PROCEDURE — 36415 COLL VENOUS BLD VENIPUNCTURE: CPT | Performed by: EMERGENCY MEDICINE

## 2022-01-01 PROCEDURE — 99239 HOSP IP/OBS DSCHRG MGMT >30: CPT | Performed by: INTERNAL MEDICINE

## 2022-01-01 PROCEDURE — 99223 1ST HOSP IP/OBS HIGH 75: CPT | Mod: AI | Performed by: INTERNAL MEDICINE

## 2022-01-01 PROCEDURE — 97166 OT EVAL MOD COMPLEX 45 MIN: CPT | Mod: GO

## 2022-01-01 PROCEDURE — 85652 RBC SED RATE AUTOMATED: CPT | Performed by: INTERNAL MEDICINE

## 2022-01-01 PROCEDURE — 99207 PR MD RECERTIFICATION HHA PT: CPT | Performed by: NURSE PRACTITIONER

## 2022-01-01 PROCEDURE — 87088 URINE BACTERIA CULTURE: CPT | Performed by: EMERGENCY MEDICINE

## 2022-01-01 PROCEDURE — 96367 TX/PROPH/DG ADDL SEQ IV INF: CPT

## 2022-01-01 PROCEDURE — 250N000009 HC RX 250: Performed by: INTERNAL MEDICINE

## 2022-01-01 PROCEDURE — 99233 SBSQ HOSP IP/OBS HIGH 50: CPT | Performed by: INTERNAL MEDICINE

## 2022-01-01 PROCEDURE — 87637 SARSCOV2&INF A&B&RSV AMP PRB: CPT | Performed by: EMERGENCY MEDICINE

## 2022-01-01 PROCEDURE — 71045 X-RAY EXAM CHEST 1 VIEW: CPT

## 2022-01-01 PROCEDURE — 81001 URINALYSIS AUTO W/SCOPE: CPT | Performed by: EMERGENCY MEDICINE

## 2022-01-01 PROCEDURE — 85014 HEMATOCRIT: CPT | Performed by: INTERNAL MEDICINE

## 2022-01-01 PROCEDURE — 99214 OFFICE O/P EST MOD 30 MIN: CPT | Performed by: NURSE PRACTITIONER

## 2022-01-01 PROCEDURE — 93296 REM INTERROG EVL PM/IDS: CPT | Performed by: INTERNAL MEDICINE

## 2022-01-01 PROCEDURE — 85027 COMPLETE CBC AUTOMATED: CPT | Performed by: INTERNAL MEDICINE

## 2022-01-01 PROCEDURE — 80048 BASIC METABOLIC PNL TOTAL CA: CPT | Performed by: INTERNAL MEDICINE

## 2022-01-01 PROCEDURE — 99214 OFFICE O/P EST MOD 30 MIN: CPT | Performed by: INTERNAL MEDICINE

## 2022-01-01 PROCEDURE — 84132 ASSAY OF SERUM POTASSIUM: CPT | Performed by: INTERNAL MEDICINE

## 2022-01-01 PROCEDURE — 84443 ASSAY THYROID STIM HORMONE: CPT | Performed by: INTERNAL MEDICINE

## 2022-01-01 PROCEDURE — 80048 BASIC METABOLIC PNL TOTAL CA: CPT | Performed by: NURSE PRACTITIONER

## 2022-01-01 PROCEDURE — 99214 OFFICE O/P EST MOD 30 MIN: CPT | Mod: 95 | Performed by: INTERNAL MEDICINE

## 2022-01-01 PROCEDURE — 99215 OFFICE O/P EST HI 40 MIN: CPT | Performed by: NURSE PRACTITIONER

## 2022-01-01 PROCEDURE — 258N000003 HC RX IP 258 OP 636: Performed by: EMERGENCY MEDICINE

## 2022-01-01 PROCEDURE — 85025 COMPLETE CBC W/AUTO DIFF WBC: CPT | Performed by: EMERGENCY MEDICINE

## 2022-01-01 PROCEDURE — 82310 ASSAY OF CALCIUM: CPT | Performed by: INTERNAL MEDICINE

## 2022-01-01 RX ORDER — WARFARIN SODIUM 2 MG/1
2 TABLET ORAL
Status: COMPLETED | OUTPATIENT
Start: 2022-01-01 | End: 2022-01-01

## 2022-01-01 RX ORDER — AMOXICILLIN 250 MG
1 CAPSULE ORAL 2 TIMES DAILY PRN
Status: DISCONTINUED | OUTPATIENT
Start: 2022-01-01 | End: 2022-01-01 | Stop reason: HOSPADM

## 2022-01-01 RX ORDER — HYDROCODONE BITARTRATE AND ACETAMINOPHEN 5; 325 MG/1; MG/1
1 TABLET ORAL EVERY 6 HOURS PRN
Qty: 30 TABLET | Refills: 0 | Status: SHIPPED | OUTPATIENT
Start: 2022-01-01 | End: 2022-01-01

## 2022-01-01 RX ORDER — AMOXICILLIN 250 MG
2 CAPSULE ORAL 2 TIMES DAILY PRN
Status: DISCONTINUED | OUTPATIENT
Start: 2022-01-01 | End: 2022-01-01 | Stop reason: HOSPADM

## 2022-01-01 RX ORDER — ONDANSETRON 4 MG/1
4 TABLET, ORALLY DISINTEGRATING ORAL EVERY 6 HOURS PRN
Status: DISCONTINUED | OUTPATIENT
Start: 2022-01-01 | End: 2022-01-01 | Stop reason: HOSPADM

## 2022-01-01 RX ORDER — ONDANSETRON 2 MG/ML
4 INJECTION INTRAMUSCULAR; INTRAVENOUS EVERY 6 HOURS PRN
Status: DISCONTINUED | OUTPATIENT
Start: 2022-01-01 | End: 2022-01-01 | Stop reason: HOSPADM

## 2022-01-01 RX ORDER — DORZOLAMIDE HCL 20 MG/ML
1 SOLUTION/ DROPS OPHTHALMIC 2 TIMES DAILY
COMMUNITY

## 2022-01-01 RX ORDER — HYDROCODONE BITARTRATE AND ACETAMINOPHEN 5; 325 MG/1; MG/1
1 TABLET ORAL EVERY 6 HOURS PRN
Qty: 20 TABLET | Refills: 0 | Status: SHIPPED | OUTPATIENT
Start: 2022-01-01 | End: 2022-01-01

## 2022-01-01 RX ORDER — BRIMONIDINE TARTRATE 2 MG/ML
1 SOLUTION/ DROPS OPHTHALMIC 2 TIMES DAILY
Status: DISCONTINUED | OUTPATIENT
Start: 2022-01-01 | End: 2022-01-01 | Stop reason: HOSPADM

## 2022-01-01 RX ORDER — POTASSIUM CHLORIDE 1500 MG/1
20 TABLET, EXTENDED RELEASE ORAL DAILY
Status: DISCONTINUED | OUTPATIENT
Start: 2022-01-01 | End: 2022-01-01 | Stop reason: HOSPADM

## 2022-01-01 RX ORDER — FUROSEMIDE 20 MG
20 TABLET ORAL DAILY
Status: DISCONTINUED | OUTPATIENT
Start: 2022-01-01 | End: 2022-01-01 | Stop reason: HOSPADM

## 2022-01-01 RX ORDER — ACETAMINOPHEN 650 MG/1
650 SUPPOSITORY RECTAL EVERY 6 HOURS PRN
Status: DISCONTINUED | OUTPATIENT
Start: 2022-01-01 | End: 2022-01-01 | Stop reason: HOSPADM

## 2022-01-01 RX ORDER — ACETAMINOPHEN 500 MG
500 TABLET ORAL EVERY 6 HOURS PRN
Status: DISCONTINUED | OUTPATIENT
Start: 2022-01-01 | End: 2022-01-01 | Stop reason: HOSPADM

## 2022-01-01 RX ORDER — DEXAMETHASONE SODIUM PHOSPHATE 10 MG/ML
6 INJECTION, SOLUTION INTRAMUSCULAR; INTRAVENOUS DAILY
Status: DISCONTINUED | OUTPATIENT
Start: 2022-01-01 | End: 2022-01-01

## 2022-01-01 RX ORDER — POLYETHYLENE GLYCOL 3350 17 G/17G
17 POWDER, FOR SOLUTION ORAL DAILY PRN
Status: DISCONTINUED | OUTPATIENT
Start: 2022-01-01 | End: 2022-01-01 | Stop reason: HOSPADM

## 2022-01-01 RX ORDER — ENOXAPARIN SODIUM 100 MG/ML
40 INJECTION SUBCUTANEOUS EVERY 24 HOURS
Status: DISCONTINUED | OUTPATIENT
Start: 2022-01-01 | End: 2022-01-01

## 2022-01-01 RX ORDER — SODIUM CHLORIDE 9 MG/ML
INJECTION, SOLUTION INTRAVENOUS CONTINUOUS
Status: DISCONTINUED | OUTPATIENT
Start: 2022-01-01 | End: 2022-01-01

## 2022-01-01 RX ORDER — ACETAMINOPHEN 325 MG/1
650 TABLET ORAL EVERY 6 HOURS PRN
Status: DISCONTINUED | OUTPATIENT
Start: 2022-01-01 | End: 2022-01-01 | Stop reason: HOSPADM

## 2022-01-01 RX ORDER — PROCHLORPERAZINE MALEATE 5 MG
5 TABLET ORAL EVERY 6 HOURS PRN
Status: DISCONTINUED | OUTPATIENT
Start: 2022-01-01 | End: 2022-01-01 | Stop reason: HOSPADM

## 2022-01-01 RX ORDER — CEFTRIAXONE 1 G/1
1 INJECTION, POWDER, FOR SOLUTION INTRAMUSCULAR; INTRAVENOUS EVERY 24 HOURS
Status: DISCONTINUED | OUTPATIENT
Start: 2022-01-01 | End: 2022-01-01

## 2022-01-01 RX ORDER — PROCHLORPERAZINE 25 MG
12.5 SUPPOSITORY, RECTAL RECTAL EVERY 12 HOURS PRN
Status: DISCONTINUED | OUTPATIENT
Start: 2022-01-01 | End: 2022-01-01 | Stop reason: HOSPADM

## 2022-01-01 RX ORDER — LIDOCAINE 40 MG/G
CREAM TOPICAL
Status: DISCONTINUED | OUTPATIENT
Start: 2022-01-01 | End: 2022-01-01 | Stop reason: HOSPADM

## 2022-01-01 RX ORDER — CARBOXYMETHYLCELLULOSE SODIUM 5 MG/ML
1 SOLUTION/ DROPS OPHTHALMIC 4 TIMES DAILY PRN
Status: DISCONTINUED | OUTPATIENT
Start: 2022-01-01 | End: 2022-01-01 | Stop reason: HOSPADM

## 2022-01-01 RX ORDER — MULTIVITAMIN,THERAPEUTIC
1 TABLET ORAL DAILY
Status: DISCONTINUED | OUTPATIENT
Start: 2022-01-01 | End: 2022-01-01 | Stop reason: HOSPADM

## 2022-01-01 RX ORDER — WARFARIN SODIUM 4 MG/1
4 TABLET ORAL
Status: COMPLETED | OUTPATIENT
Start: 2022-01-01 | End: 2022-01-01

## 2022-01-01 RX ORDER — WARFARIN SODIUM 4 MG/1
TABLET ORAL
Qty: 90 TABLET | Refills: 1 | Status: SHIPPED | OUTPATIENT
Start: 2022-01-01

## 2022-01-01 RX ORDER — DORZOLAMIDE HCL 20 MG/ML
1 SOLUTION/ DROPS OPHTHALMIC 2 TIMES DAILY
Status: DISCONTINUED | OUTPATIENT
Start: 2022-01-01 | End: 2022-01-01 | Stop reason: HOSPADM

## 2022-01-01 RX ORDER — BRIMONIDINE TARTRATE 2 MG/ML
1 SOLUTION/ DROPS OPHTHALMIC 2 TIMES DAILY
COMMUNITY

## 2022-01-01 RX ORDER — FUROSEMIDE 20 MG
TABLET ORAL
Qty: 270 TABLET | Refills: 1 | Status: SHIPPED | OUTPATIENT
Start: 2022-01-01

## 2022-01-01 RX ORDER — CEFTRIAXONE 1 G/1
1 INJECTION, POWDER, FOR SOLUTION INTRAMUSCULAR; INTRAVENOUS ONCE
Status: COMPLETED | OUTPATIENT
Start: 2022-01-01 | End: 2022-01-01

## 2022-01-01 RX ORDER — SODIUM CHLORIDE AND POTASSIUM CHLORIDE 150; 900 MG/100ML; MG/100ML
INJECTION, SOLUTION INTRAVENOUS CONTINUOUS
Status: DISCONTINUED | OUTPATIENT
Start: 2022-01-01 | End: 2022-01-01 | Stop reason: HOSPADM

## 2022-01-01 RX ORDER — WARFARIN SODIUM 4 MG/1
TABLET ORAL
Qty: 207 TABLET | OUTPATIENT
Start: 2022-01-01

## 2022-01-01 RX ORDER — LATANOPROST 50 UG/ML
1 SOLUTION/ DROPS OPHTHALMIC AT BEDTIME
Status: DISCONTINUED | OUTPATIENT
Start: 2022-01-01 | End: 2022-01-01 | Stop reason: HOSPADM

## 2022-01-01 RX ORDER — HYDROCODONE BITARTRATE AND ACETAMINOPHEN 5; 325 MG/1; MG/1
.5-1 TABLET ORAL EVERY 6 HOURS PRN
COMMUNITY

## 2022-01-01 RX ORDER — POTASSIUM CHLORIDE 1500 MG/1
TABLET, EXTENDED RELEASE ORAL
Qty: 200 TABLET | Refills: 0 | Status: SHIPPED | OUTPATIENT
Start: 2022-01-01

## 2022-01-01 RX ADMIN — DORZOLAMIDE HYDROCHLORIDE 1 DROP: 20 SOLUTION/ DROPS OPHTHALMIC at 21:02

## 2022-01-01 RX ADMIN — DORZOLAMIDE HYDROCHLORIDE 1 DROP: 20 SOLUTION/ DROPS OPHTHALMIC at 20:56

## 2022-01-01 RX ADMIN — BRIMONIDINE TARTRATE 1 DROP: 2 SOLUTION OPHTHALMIC at 09:28

## 2022-01-01 RX ADMIN — POTASSIUM CHLORIDE AND SODIUM CHLORIDE: 900; 150 INJECTION, SOLUTION INTRAVENOUS at 01:49

## 2022-01-01 RX ADMIN — SODIUM CHLORIDE: 9 INJECTION, SOLUTION INTRAVENOUS at 15:47

## 2022-01-01 RX ADMIN — POTASSIUM CHLORIDE AND SODIUM CHLORIDE: 900; 150 INJECTION, SOLUTION INTRAVENOUS at 11:47

## 2022-01-01 RX ADMIN — SODIUM CHLORIDE 50 ML: 900 INJECTION INTRAVENOUS at 18:33

## 2022-01-01 RX ADMIN — BRIMONIDINE TARTRATE 1 DROP: 2 SOLUTION OPHTHALMIC at 09:30

## 2022-01-01 RX ADMIN — THERA TABS 1 TABLET: TAB at 09:06

## 2022-01-01 RX ADMIN — LATANOPROST 1 DROP: 50 SOLUTION/ DROPS OPHTHALMIC at 21:50

## 2022-01-01 RX ADMIN — LATANOPROST 1 DROP: 50 SOLUTION/ DROPS OPHTHALMIC at 22:14

## 2022-01-01 RX ADMIN — LATANOPROST 1 DROP: 50 SOLUTION/ DROPS OPHTHALMIC at 21:11

## 2022-01-01 RX ADMIN — DORZOLAMIDE HYDROCHLORIDE 1 DROP: 20 SOLUTION/ DROPS OPHTHALMIC at 08:34

## 2022-01-01 RX ADMIN — POTASSIUM CHLORIDE 20 MEQ: 1500 TABLET, EXTENDED RELEASE ORAL at 09:06

## 2022-01-01 RX ADMIN — DORZOLAMIDE HYDROCHLORIDE 1 DROP: 20 SOLUTION/ DROPS OPHTHALMIC at 20:29

## 2022-01-01 RX ADMIN — SODIUM CHLORIDE: 9 INJECTION, SOLUTION INTRAVENOUS at 08:24

## 2022-01-01 RX ADMIN — WARFARIN SODIUM 4 MG: 4 TABLET ORAL at 17:09

## 2022-01-01 RX ADMIN — FUROSEMIDE 20 MG: 20 TABLET ORAL at 09:25

## 2022-01-01 RX ADMIN — POTASSIUM CHLORIDE 20 MEQ: 1500 TABLET, EXTENDED RELEASE ORAL at 08:33

## 2022-01-01 RX ADMIN — WARFARIN SODIUM 4 MG: 4 TABLET ORAL at 19:18

## 2022-01-01 RX ADMIN — SODIUM CHLORIDE 50 ML: 9 INJECTION, SOLUTION INTRAVENOUS at 09:09

## 2022-01-01 RX ADMIN — BRIMONIDINE TARTRATE 1 DROP: 2 SOLUTION OPHTHALMIC at 20:55

## 2022-01-01 RX ADMIN — DORZOLAMIDE HYDROCHLORIDE 1 DROP: 20 SOLUTION/ DROPS OPHTHALMIC at 09:30

## 2022-01-01 RX ADMIN — SODIUM CHLORIDE 1000 ML: 9 INJECTION, SOLUTION INTRAVENOUS at 19:52

## 2022-01-01 RX ADMIN — DORZOLAMIDE HYDROCHLORIDE 1 DROP: 20 SOLUTION/ DROPS OPHTHALMIC at 21:08

## 2022-01-01 RX ADMIN — BRIMONIDINE TARTRATE 1 DROP: 2 SOLUTION OPHTHALMIC at 21:02

## 2022-01-01 RX ADMIN — FUROSEMIDE 20 MG: 20 TABLET ORAL at 08:32

## 2022-01-01 RX ADMIN — CEFTRIAXONE SODIUM 1 G: 1 INJECTION, POWDER, FOR SOLUTION INTRAMUSCULAR; INTRAVENOUS at 20:10

## 2022-01-01 RX ADMIN — LATANOPROST 1 DROP: 50 SOLUTION/ DROPS OPHTHALMIC at 21:03

## 2022-01-01 RX ADMIN — DORZOLAMIDE HYDROCHLORIDE 1 DROP: 20 SOLUTION/ DROPS OPHTHALMIC at 09:22

## 2022-01-01 RX ADMIN — THERA TABS 1 TABLET: TAB at 08:49

## 2022-01-01 RX ADMIN — POTASSIUM CHLORIDE 20 MEQ: 1500 TABLET, EXTENDED RELEASE ORAL at 08:49

## 2022-01-01 RX ADMIN — SODIUM CHLORIDE: 9 INJECTION, SOLUTION INTRAVENOUS at 18:35

## 2022-01-01 RX ADMIN — DEXAMETHASONE SODIUM PHOSPHATE 6 MG: 10 INJECTION, SOLUTION INTRAMUSCULAR; INTRAVENOUS at 14:38

## 2022-01-01 RX ADMIN — CEFTRIAXONE SODIUM 1 G: 1 INJECTION, POWDER, FOR SOLUTION INTRAMUSCULAR; INTRAVENOUS at 20:30

## 2022-01-01 RX ADMIN — FUROSEMIDE 20 MG: 20 TABLET ORAL at 09:06

## 2022-01-01 RX ADMIN — POTASSIUM CHLORIDE 20 MEQ: 1500 TABLET, EXTENDED RELEASE ORAL at 09:25

## 2022-01-01 RX ADMIN — CEFTRIAXONE SODIUM 1 G: 1 INJECTION, POWDER, FOR SOLUTION INTRAMUSCULAR; INTRAVENOUS at 19:18

## 2022-01-01 RX ADMIN — FUROSEMIDE 20 MG: 20 TABLET ORAL at 08:49

## 2022-01-01 RX ADMIN — DORZOLAMIDE HYDROCHLORIDE 1 DROP: 20 SOLUTION/ DROPS OPHTHALMIC at 09:28

## 2022-01-01 RX ADMIN — REMDESIVIR 100 MG: 100 INJECTION, POWDER, LYOPHILIZED, FOR SOLUTION INTRAVENOUS at 17:10

## 2022-01-01 RX ADMIN — POTASSIUM CHLORIDE 20 MEQ: 1500 TABLET, EXTENDED RELEASE ORAL at 09:18

## 2022-01-01 RX ADMIN — WARFARIN SODIUM 2 MG: 2 TABLET ORAL at 18:51

## 2022-01-01 RX ADMIN — ACETAMINOPHEN 500 MG: 500 TABLET, FILM COATED ORAL at 08:28

## 2022-01-01 RX ADMIN — REMDESIVIR 200 MG: 100 INJECTION, POWDER, LYOPHILIZED, FOR SOLUTION INTRAVENOUS at 08:25

## 2022-01-01 RX ADMIN — SODIUM CHLORIDE: 9 INJECTION, SOLUTION INTRAVENOUS at 17:38

## 2022-01-01 RX ADMIN — THERA TABS 1 TABLET: TAB at 09:18

## 2022-01-01 RX ADMIN — REMDESIVIR 100 MG: 100 INJECTION, POWDER, LYOPHILIZED, FOR SOLUTION INTRAVENOUS at 18:35

## 2022-01-01 RX ADMIN — DEXAMETHASONE SODIUM PHOSPHATE 6 MG: 10 INJECTION, SOLUTION INTRAMUSCULAR; INTRAVENOUS at 13:03

## 2022-01-01 RX ADMIN — SODIUM CHLORIDE: 9 INJECTION, SOLUTION INTRAVENOUS at 04:11

## 2022-01-01 RX ADMIN — LATANOPROST 1 DROP: 50 SOLUTION/ DROPS OPHTHALMIC at 21:02

## 2022-01-01 RX ADMIN — THERA TABS 1 TABLET: TAB at 09:25

## 2022-01-01 RX ADMIN — BRIMONIDINE TARTRATE 1 DROP: 2 SOLUTION OPHTHALMIC at 21:10

## 2022-01-01 RX ADMIN — WARFARIN SODIUM 2 MG: 2 TABLET ORAL at 18:26

## 2022-01-01 RX ADMIN — WARFARIN SODIUM 2 MG: 2 TABLET ORAL at 18:13

## 2022-01-01 RX ADMIN — THERA TABS 1 TABLET: TAB at 08:33

## 2022-01-01 RX ADMIN — DEXAMETHASONE SODIUM PHOSPHATE 6 MG: 10 INJECTION, SOLUTION INTRAMUSCULAR; INTRAVENOUS at 12:49

## 2022-01-01 RX ADMIN — BRIMONIDINE TARTRATE 1 DROP: 2 SOLUTION OPHTHALMIC at 20:28

## 2022-01-01 RX ADMIN — FUROSEMIDE 20 MG: 20 TABLET ORAL at 09:18

## 2022-01-01 RX ADMIN — BRIMONIDINE TARTRATE 1 DROP: 2 SOLUTION OPHTHALMIC at 09:15

## 2022-01-01 RX ADMIN — POTASSIUM CHLORIDE 20 MEQ: 1500 TABLET, EXTENDED RELEASE ORAL at 08:28

## 2022-01-01 RX ADMIN — BRIMONIDINE TARTRATE 1 DROP: 2 SOLUTION OPHTHALMIC at 08:34

## 2022-01-01 RX ADMIN — FUROSEMIDE 20 MG: 20 TABLET ORAL at 08:33

## 2022-01-01 ASSESSMENT — ACTIVITIES OF DAILY LIVING (ADL)
ADLS_ACUITY_SCORE: 48
ADLS_ACUITY_SCORE: 50
ADLS_ACUITY_SCORE: 42
ADLS_ACUITY_SCORE: 42
ADLS_ACUITY_SCORE: 35
ADLS_ACUITY_SCORE: 45
ADLS_ACUITY_SCORE: 52
ADLS_ACUITY_SCORE: 35
ADLS_ACUITY_SCORE: 52
ADLS_ACUITY_SCORE: 46
ADLS_ACUITY_SCORE: 35
ADLS_ACUITY_SCORE: 35
ADLS_ACUITY_SCORE: 52
ADLS_ACUITY_SCORE: 52
EQUIPMENT_CURRENTLY_USED_AT_HOME: WALKER, STANDARD
ADLS_ACUITY_SCORE: 48
DEPENDENT_IADLS:: SHOPPING;MEAL PREPARATION;MEDICATION MANAGEMENT;TRANSPORTATION
ADLS_ACUITY_SCORE: 45
ADLS_ACUITY_SCORE: 52
ADLS_ACUITY_SCORE: 48
ADLS_ACUITY_SCORE: 46
ADLS_ACUITY_SCORE: 35
ADLS_ACUITY_SCORE: 46
ADLS_ACUITY_SCORE: 52
DRESS: 0-->ASSISTANCE NEEDED (DEVELOPMENTALLY APPROPRIATE)
ADLS_ACUITY_SCORE: 42
ADLS_ACUITY_SCORE: 35
ADLS_ACUITY_SCORE: 45
ADLS_ACUITY_SCORE: 52
ADLS_ACUITY_SCORE: 52
ADLS_ACUITY_SCORE: 46
ADLS_ACUITY_SCORE: 46
ADLS_ACUITY_SCORE: 42
ADLS_ACUITY_SCORE: 35
ADLS_ACUITY_SCORE: 35
ADLS_ACUITY_SCORE: 52
ADLS_ACUITY_SCORE: 46
TOILETING_ISSUES: NO
ADLS_ACUITY_SCORE: 52
ADLS_ACUITY_SCORE: 50
ADLS_ACUITY_SCORE: 35
ADLS_ACUITY_SCORE: 46
ADLS_ACUITY_SCORE: 48
DOING_ERRANDS_INDEPENDENTLY_DIFFICULTY: YES
DRESSING/BATHING: BATHING DIFFICULTY, ASSISTANCE 1 PERSON;DRESSING DIFFICULTY, ASSISTANCE 1 PERSON
ADLS_ACUITY_SCORE: 45
CHANGE_IN_FUNCTIONAL_STATUS_SINCE_ONSET_OF_CURRENT_ILLNESS/INJURY: YES
ADLS_ACUITY_SCORE: 52
ADLS_ACUITY_SCORE: 52
ADLS_ACUITY_SCORE: 48
ADLS_ACUITY_SCORE: 46
DIFFICULTY_EATING/SWALLOWING: NO
WALKING_OR_CLIMBING_STAIRS: AMBULATION DIFFICULTY, REQUIRES EQUIPMENT;AMBULATION DIFFICULTY, ASSISTANCE 1 PERSON
ADLS_ACUITY_SCORE: 46
ADLS_ACUITY_SCORE: 45
ADLS_ACUITY_SCORE: 46
ADLS_ACUITY_SCORE: 46
ADLS_ACUITY_SCORE: 42
ADLS_ACUITY_SCORE: 35
DRESS: 1-->ASSISTANCE (EQUIPMENT/PERSON) NEEDED
ADLS_ACUITY_SCORE: 46
ADLS_ACUITY_SCORE: 52
ADLS_ACUITY_SCORE: 52
ADLS_ACUITY_SCORE: 45
WALKING_OR_CLIMBING_STAIRS_DIFFICULTY: YES
FALL_HISTORY_WITHIN_LAST_SIX_MONTHS: YES
BATHING: 1-->ASSISTANCE NEEDED
DRESSING/BATHING_DIFFICULTY: YES
ADLS_ACUITY_SCORE: 45
ADLS_ACUITY_SCORE: 52
ADLS_ACUITY_SCORE: 52
ADLS_ACUITY_SCORE: 35
ADLS_ACUITY_SCORE: 46
ADLS_ACUITY_SCORE: 48
ADLS_ACUITY_SCORE: 52
TRANSFERRING: 0-->ASSISTANCE NEEDED (DEVELOPMENTALLY APPROPRIATE)
ADLS_ACUITY_SCORE: 48
ADLS_ACUITY_SCORE: 52
TRANSFERRING: 1-->ASSISTANCE (EQUIPMENT/PERSON) NEEDED
ADLS_ACUITY_SCORE: 46
ADLS_ACUITY_SCORE: 35
ADLS_ACUITY_SCORE: 35
CONCENTRATING,_REMEMBERING_OR_MAKING_DECISIONS_DIFFICULTY: YES
WEAR_GLASSES_OR_BLIND: NO
ADLS_ACUITY_SCORE: 46
ADLS_ACUITY_SCORE: 52

## 2022-01-01 ASSESSMENT — ENCOUNTER SYMPTOMS
FATIGUE: 1
DIARRHEA: 1
NAUSEA: 1
VOMITING: 1
ACTIVITY CHANGE: 1

## 2022-01-01 ASSESSMENT — MIFFLIN-ST. JEOR: SCORE: 1128.96

## 2022-01-03 NOTE — PROGRESS NOTES
"  ANTICOAGULATION FOLLOW-UP CLINIC VISIT    Patient Name:  Amira Arreola  Date:  9/26/2018  Contact Type:  Telephone    SUBJECTIVE:        OBJECTIVE    INR   Date Value Ref Range Status   09/26/2018 2.45 (H) 0.86 - 1.14 Final       ASSESSMENT / PLAN  INR assessment THER    Recheck INR In: 2 WEEKS    INR Location Outside lab      Anticoagulation Summary as of 9/26/2018     INR goal 2.0-3.0   Today's INR 2.45   Warfarin maintenance plan 4 mg (4 mg x 1) every day   Full warfarin instructions 4 mg every day   Weekly warfarin total 28 mg   No change documented Tigist Corral, RN   Plan last modified Bri Mcbride RN (6/7/2018)   Next INR check 10/10/2018   Target end date Indefinite    Indications   Long-term (current) use of anticoagulants [Z79.01] [Z79.01]  Atrial fibrillation (H) [I48.91] (Resolved) [I48.91]         Anticoagulation Episode Summary     INR check location     Preferred lab     Send INR reminders to CS ANTICOAGULATION    Comments patient have standing INR order to be draw at infusion visit.  advise to call  [facility] when have INR draw for dosing instruction.  patient can be reach at home phone [phone]      Anticoagulation Care Providers     Provider Role Specialty Phone number    Addy Frias MD Responsible Internal Medicine [phone]            See the Encounter Report to view Anticoagulation Flowsheet and Dosing Calendar (Go to Encounters tab in chart review, and find the Anticoagulation Therapy Visit)    Dosage adjustment made based on physician directed care plan. INR result note from oncology: 2.45. Called patient. Spoke to spouse who states patient currently at doctor appt.  Asked if there is any dose change.  He will relay message to patient:  \"continue same warfarin dose and recheck INR 2 weeks\".    Tigist Corral RN               " Spoke with pt, she became extremely upset. She is asking if there is any provider who can sign an order for a lab as she works at [employer] and does not feel the bleeding or pain are severe enough go to UC or ED at this time. She would like to be able to stay at work.     I explained that at either UC or ED they can do multiple labs as well as an ultrasound to give her answers as to what is going on and she because upset and stated that this seems like poor care to her and misuse of resources.     Please advise.     Thanks,  DARRICK Awad  [facility]

## 2022-01-06 NOTE — TELEPHONE ENCOUNTER
ANTICOAGULATION MANAGEMENT     Amira Arreola 89 year old female is on warfarin with therapeutic INR result. (Goal INR )    Recent labs: (last 7 days)     01/06/22  1127   INR 2.7*       ASSESSMENT     Source(s): Chart Review and Home Care/Facility Nurse       Warfarin doses taken: Warfarin taken as instructed    Diet: No new diet changes identified    New illness, injury, or hospitalization: No    Medication/supplement changes: None noted    Signs or symptoms of bleeding or clotting: No    Previous INR: Therapeutic last 2(+) visits    Additional findings: None     PLAN     Recommended plan for no diet, medication or health factor changes affecting INR     Dosing Instructions: Continue your current warfarin dose with next INR in 6 weeks       Summary  As of 1/6/2022    Full warfarin instructions:  2 mg every Mon, Wed, Fri; 4 mg all other days   Next INR check:               Telephone call with home care nurse Gerardo who agrees to plan and repeated back plan correctly    Orders given to  Homecare nurse/facility to recheck    Education provided: Please call back if any changes to your diet, medications or how you've been taking warfarin    Plan made per [facility] anticoagulation protocol    Suyapa Walton RN  Anticoagulation Clinic  1/6/2022    _______________________________________________________________________     Anticoagulation Episode Summary     Current INR goal:  2.0-3.0   TTR:  91.0 % (1 y)   Target end date:  Indefinite   Send INR reminders to:  DANTE SUMNER    Indications    Long term current use of anticoagulant therapy [Z79.01]  Atrial fibrillation (H) [I48.91] (Resolved) [I48.91]  Paroxysmal atrial fibrillation (H) [I48.0]           Comments:   Rajiv HENSLEY [facility] [phone] private pay nursing visits to check INR         Anticoagulation Care Providers     Provider Role Specialty Phone number    Addy Frias MD Referring Internal Medicine [phone]

## 2022-01-06 NOTE — TELEPHONE ENCOUNTER
RN Gerardo from [facility] called to report INR results of 2.7.     Routing message to INR nurse to follow up with Geradro at [phone].

## 2022-02-10 NOTE — PROGRESS NOTES
Assessment & Plan   Problem List Items Addressed This Visit     None      Visit Diagnoses     Acute pain of left shoulder    -  Primary    Relevant Orders    Home Care Nursing Referral    Dizziness        Relevant Orders    Home Care Nursing Referral    Generalized muscle weakness        Relevant Orders    Home Care Nursing Referral         Patient has left shoulder pain that I suspect is the rotator cuff.  Physical therapy is ordered for home considering it is very difficult for her to leave the home and cannot leave independently.  She has also been a little more weak and has some dizziness in the morning.  She is likely very dehydrated as she does not drink much at all during the day.  Encouraged her to drink more throughout the day which could help with this dizziness and fatigue/weakness.  Recommend physical therapy also assess her mobility/strength.    DAYSI Cordero CNP  [facility] NITA Collier is a 89 year old who presents for the following health issues presenting with her son     HPI     Fatigue  Confusion ongoing for about a month  Left arm soreness and stiffness      Let upper arm soreness  Hard to raise the arm   Pain radiates down in the arm a little   Had booster about 3-4 months ago and thought that may have caused the pain     Dizzy for about 5-10 mins in the morning when she sits up in bed. For about 1 month   Room spinning dizziness   No headaches    No paresthesias   No head injury     More generalized weakness lately.  She uses a Rollator at home and does well with that  Little more confusion on rare occasion   Daughter thought Pt was hallucinating just one time. Saw a dog next to the TV that wasn't there.   She states she does not drink much water.  She will drink less than 1/4 cup of coffee and some diet Coke throughout the day      Review of Systems   Detailed as above         Objective    /71 (BP Location: Right arm, Patient Position:  "Sitting, Cuff Size: Adult Regular)   Pulse 71   Temp 97.2  F (36.2  C) (Temporal)   Resp 16   Ht 1.651 m (5' 5\")   Wt 70.3 kg (155 lb)   SpO2 98%   BMI 25.79 kg/m    There is no height or weight on file to calculate BMI.  Physical Exam  Constitutional:       Appearance: Normal appearance.      Comments: Sitting in wheelchair   Cardiovascular:      Pulses: Normal pulses.   Pulmonary:      Effort: Pulmonary effort is normal.   Musculoskeletal:         General: No swelling or tenderness.      Comments: L shoulder adduction to 90 degrees   Skin:     General: Skin is warm and dry.      Findings: No erythema.   Neurological:      Mental Status: She is alert.   Psychiatric:         Mood and Affect: Mood normal.                "

## 2022-02-10 NOTE — PATIENT INSTRUCTIONS
Increase your water intake. Add in 1 glass of water in the morning before lunch. Try to also add another glass after lunch        You will get a call from physical therapy for them to come to your home

## 2022-02-17 NOTE — PROGRESS NOTES
ANTICOAGULATION MANAGEMENT     Amira Arreola 89 year old female is on warfarin with therapeutic INR result. (Goal INR 2.0-3.0)    Recent labs: (last 7 days)     02/17/22  1146   INR 2.5*       ASSESSMENT     Source(s): Chart Review and Home Care/Facility Nurse       Warfarin doses taken: Warfarin taken as instructed    Diet: No new diet changes identified    New illness, injury, or hospitalization: No    Medication/supplement changes: None noted    Signs or symptoms of bleeding or clotting: No    Previous INR: Therapeutic last 2(+) visits    Additional findings: None     PLAN     Recommended plan for no diet, medication or health factor changes affecting INR     Dosing Instructions: Continue your current warfarin dose with next INR in 6 weeks       Summary  As of 2/17/2022    Full warfarin instructions:  2 mg every Mon, Wed, Fri; 4 mg all other days   Next INR check:  3/31/2022             Telephone call with home care nurse Gerardo who verbalizes understanding and agrees to plan    Orders given to  Homecare nurse/facility to recheck    Education provided: Please call back if any changes to your diet, medications or how you've been taking warfarin    Plan made per [facility] anticoagulation protocol    Suyapa Walton RN  Anticoagulation Clinic  2/17/2022    _______________________________________________________________________     Anticoagulation Episode Summary     Current INR goal:  2.0-3.0   TTR:  92.4 % (1 y)   Target end date:  Indefinite   Send INR reminders to:  DANTE SUMNER    Indications    Long term current use of anticoagulant therapy [Z79.01]  Atrial fibrillation (H) [I48.91] (Resolved) [I48.91]  Paroxysmal atrial fibrillation (H) [I48.0]           Comments:   Rajiv RN [facility] [phone] private pay nursing visits to check INR         Anticoagulation Care Providers     Provider Role Specialty Phone number    Addy Frias MD Referring Internal Medicine [phone]

## 2022-04-01 NOTE — PROGRESS NOTES
ANTICOAGULATION MANAGEMENT     Amira Arreola 89 year old female is on warfarin with therapeutic INR result. (Goal INR 2.0-3.0)    Recent labs: (last 7 days)     04/01/22  1102   INR 3.8*       ASSESSMENT       Source(s): Chart Review and Home Care/Facility Nurse       Warfarin doses taken: Warfarin taken as instructed    Diet: Decreased greens/vitamin K in diet; plans to resume previous intake    New illness, injury, or hospitalization: Yes: 13 lb weight loss since 3/17/22 when seen by home care last, unintentional    Medication/supplement changes: None noted    Signs or symptoms of bleeding or clotting: No    Previous INR: Therapeutic last 2(+) visits    Additional findings: None       PLAN     Recommended plan for ongoing change(s) affecting INR     Dosing Instructions: hold dose then decrease your warfarin dose (9.1% change) with next INR in 1 week       Summary  As of 4/1/2022    Full warfarin instructions:  4/1: Hold; Otherwise 4 mg every Tue, Thu, Sat; 2 mg all other days   Next INR check:  4/8/2022             Telephone call with [facility] home care/facility nurse who agrees to plan and repeated back plan correctly    Patient to recheck with home meter    Education provided: Importance of therapeutic range, Importance of following up at instructed interval, Importance of taking warfarin as instructed and Monitoring for bleeding signs and symptoms    Plan made per [facility] anticoagulation protocol    Senia Sanchez RN  Anticoagulation Clinic  4/1/2022    _______________________________________________________________________     Anticoagulation Episode Summary     Current INR goal:  2.0-3.0   TTR:  85.5 % (1 y)   Target end date:  Indefinite   Send INR reminders to:  DANTE SUMNER    Indications    Long term current use of anticoagulant therapy [Z79.01]  Atrial fibrillation (H) [I48.91] (Resolved) [I48.91]  Paroxysmal atrial fibrillation (H) [I48.0]           Comments:   [facility] [phone] private pay  nursing visits to check INR         Anticoagulation Care Providers     Provider Role Specialty Phone number    Addy Frias MD Referring Internal Medicine [phone]

## 2022-04-04 NOTE — TELEPHONE ENCOUNTER
Home care called regarding continuation of orders    Skilled nursing for INR management  Frequency dependent on INR readings.    Verbal ok given    Alycia Mishra RN

## 2022-04-08 NOTE — PROGRESS NOTES
ANTICOAGULATION MANAGEMENT     Amira Arreola 89 year old female is on warfarin with therapeutic INR result. (Goal INR 2.0-3.0)    Recent labs: (last 7 days)     04/08/22  1107   INR 2.2*       ASSESSMENT       Source(s): Chart Review and Home Care/Facility Nurse       Warfarin doses taken: Warfarin taken as instructed    Diet: No new diet changes identified    New illness, injury, or hospitalization: No    Medication/supplement changes: None noted    Signs or symptoms of bleeding or clotting: No    Previous INR: Supratherapeutic    Additional findings: Weight has stablized this past week       PLAN     Recommended plan for no diet, medication or health factor changes affecting INR     Dosing Instructions: continue your current warfarin dose with next INR in 2 weeks       Summary  As of 4/8/2022    Full warfarin instructions:  4 mg every Tue, Thu, Sat; 2 mg all other days   Next INR check:  4/22/2022             Telephone call with [facility] home care/facility nurse who agrees to plan and repeated back plan correctly    Orders given to  Homecare nurse/facility to recheck    Education provided: Please call back if any changes to your diet, medications or how you've been taking warfarin    Plan made per [facility] anticoagulation protocol    Senia Sanchez RN  Anticoagulation Clinic  4/8/2022    _______________________________________________________________________     Anticoagulation Episode Summary     Current INR goal:  2.0-3.0   TTR:  86.4 % (1 y)   Target end date:  Indefinite   Send INR reminders to:  DANTE SUMNER    Indications    Long term current use of anticoagulant therapy [Z79.01]  Atrial fibrillation (H) [I48.91] (Resolved) [I48.91]  Paroxysmal atrial fibrillation (H) [I48.0]           Comments:   Rajiv HENS[facility] [phone] private pay nursing visits to check INR         Anticoagulation Care Providers     Provider Role Specialty Phone number    Addy Frias MD Referring Internal Medicine  [phone]

## 2022-04-12 NOTE — PLAN OF CARE
Findings discussed with Patients Family VSS. Tele: paced with underlying afib. PPM site CDI. Incontinent of urine. Oriented but forgetful. Discharge to [place] tomorrow. Will continue to monitor.

## 2022-04-19 NOTE — LETTER
4/19/2022    Addy Frias MD  [address]  [place] [address]    RE: Amira Arreola       Dear Colleague,     I had the pleasure of seeing Amira Arreola in the [facility].  Cardiology Clinic Progress Note  Amira Arreola MRN# [id number]   YOB: 1932 Age: 89 year old   Primary Cardiologist: Dr. Rivera Reason for visit: CORE follow up             Assessment and Plan:   Amira Arreola is a very pleasant 89 year old female with a history of HFpEF, chronic atrial fibrillation, hypertension, mitral regurgitation, tricuspid regurgitation and hx of multiple myeloma.      1.  Chronic diastolic heart failure/HFpEF - Echocardiogram completed 3/23/19 LVEF 55-60%, no wall motion abnormalities, moderate mitral regurgitation, moderate tricuspid regurgitation, and severe pulmonary hypertension.               - NYHA class II-III, stage C              - Dry weight : ~ 135#              - Diuretic regimen : continue furosemide to 60mg daily, additional 20mg for weight gain >2# overnight or increased edema.               - Continue potassium to 20meq BID and additional 20meq on days when she takes an extra furosemide.               - Aldosterone antagonist : none  2. Chronic atrial fibrillation - s/p AV solomon ablation with PPM implantation 7/5/19              - [id number] score 5 (HTN, HF, age, female)              - Continue warfarin for thromboembolic prophylaxis.   3. Moderate mitral regurgitation, moderate tricuspid regurgitation               - Will consider repeat echocardiogram in the future if worsening symptoms, currently won't change any management clinically.  Last echo 3/2019.  4. Hypertension  5. Severe pulmonary hypertension  6. Multiple myeloma with mets to bone - used to follow with Dr. Roberts, who recommended no further monitoring of myeloma.     I saw Amira today for CORE follow up. She is doing well from a heart failure standpoint. She has lost ~ 13# on clinic  scale since our last visit, no back to a weight where she used to consistently be at. She shares she has been working on decreasing her portion size. She is compensated and euvolemic on exam. No medication changes. Support given today. All questions answered.     Changes today: none    Follow up plan:     Follow up with Dr. Rivera end of summer/early fall        History of Presenting Illness:    Amira Arreola is a very pleasant 89 year old female with a history of HFpEF, chronic atrial fibrillation, hypertension, mitral regurgitation, tricuspid regurgitation and hx of multiple myeloma.      Amira had multiple hospitalizations last summer (2019) for atrial fibrillation and decompensated diastolic HF, ultimately undergoing an AV solomon ablation with PPM 7/5/19. Echocardiogram completed 3/23/19 LVEF 55-60%, no wall motion abnormalities, moderate mitral regurgitation, moderate tricuspid regurgitation, and severe pulmonary hypertension.      Patient has been following closely in CORE clinic. She saw Dr. Rivera in April 2021 at which time no medications were changed. Weight was stable ~ 150#.      She saw Dr. Roberts in October for her slowly progressing multiple myeloma at which time it  patient had increasing weakness/confusion, noting patient would not be a candidate for myeloma-directed therapy due to her overall decline and recommended no further myeloma monitoring and recommended follow up as needed.      I last saw her for CORE follow up in December at which time she was doing well from a heart failure standpoint, no medication changes were made. Weight was slightly up in clinic. She was resistant to adjustments in her diuretics due to increased urination, stating it affects her quality of life.     Patient is here today for CORE follow up.     Patient reports feeling good. Monitoring weights daily a home. Home weight is down to 136# which is down from ~ 140# the beginning of March. Clinic weight 142# which is down ~  13#. States she has been trying to monitor her portion size and thinks this accounts for weight loss. Denies shortness of breath at rest. Denies exertional dyspnea. Denies orthopnea or PND. Sleeping good. Denies chest pain or chest tightness. Denies dizziness, lightheadedness or other presyncopal symptoms. Denies tachycardia or palpitations. Denies any falls. Taking medications daily.     Labs today show stable kidney function and electrolytes, creatinine 0.96. Blood pressure 121/68 and HR 73 in clinic today.    Appetite good. Children help with meals. Ordering some meals out. Drinking 1-2 diet cokes, 1 cup coffee, 1 cup milk, 1-2 glasses water. No set exercise routine. Using walker and wheelchair for support. Denies tobacco or alcohol use. Has a home health care aide 5 days per week.         Social History    ,  -  passed away Summer 2021, 6 children. Retired nurse.   Social History     Socioeconomic History     Marital status:      Spouse name: Tom     Number of children: 6     Years of education: Not on file     Highest education level: Not on file   Occupational History     Occupation: rn anesthetist     Employer: RETIRED   Tobacco Use     Smoking status: Never Smoker     Smokeless tobacco: Never Used   Substance and Sexual Activity     Alcohol use: No     Alcohol/week: 0.0 standard drinks     Drug use: No     Sexual activity: Never   Other Topics Concern     Parent/sibling w/ CABG, MI or angioplasty before 65F 55M? Not Asked   Social History Narrative     Not on file     Social Determinants of Health     Financial Resource Strain: Not on file   Food Insecurity: Not on file   Transportation Needs: Not on file   Physical Activity: Not on file   Stress: Not on file   Social Connections: Not on file   Intimate Partner Violence: Not on file   Housing Stability: Not on file            Review of Systems:   Skin:  Positive for bruising   Eyes:  Positive for glaucoma  ENT:  Negative   "  Respiratory:  Positive for cough  Cardiovascular:    Positive for;edema  Gastroenterology: Negative    Genitourinary:  Positive for urinary frequency  Musculoskeletal:  Negative    Neurologic:  Negative    Psychiatric:  Negative    Heme/Lymph/Imm:  Positive for allergies  Endocrine:  Negative           Physical Exam:   Vitals: /68   Pulse 73   Ht 1.651 m (5' 5\")   Wt 64.5 kg (142 lb 1.6 oz)   SpO2 100%   BMI 23.65 kg/m     Wt Readings from Last 4 Encounters:   04/19/22 64.5 kg (142 lb 1.6 oz)   02/10/22 70.3 kg (155 lb)   12/16/21 70.1 kg (154 lb 9.6 oz)   08/24/21 65.8 kg (145 lb)     GEN: well nourished, in no acute distress.  HEENT:  Pupils equal, round. Sclerae nonicteric.   NECK: Supple, no masses appreciated. JVP appears normal.   C/V:  Regular rate and rhythm, no murmur, rub or gallop.    RESP: Respirations are unlabored. Clear to auscultation bilaterally without wheezing, rales, or rhonchi.  GI: Abdomen soft, nontender.  EXTREM: No LE edema.  NEURO: Alert and oriented, cooperative.  SKIN: Warm and dry.       Data:     LIPID RESULTS:  Lab Results   Component Value Date    CHOL 160 10/12/2016    HDL 76 10/12/2016    LDL 71 10/12/2016    TRIG 66 10/12/2016    CHOLHDLRATIO 2.3 09/21/2015     LIVER ENZYME RESULTS:  Lab Results   Component Value Date    AST 20 01/06/2021    ALT 19 01/06/2021     CBC RESULTS:  Lab Results   Component Value Date    WBC 7.9 10/06/2021    WBC 7.3 07/06/2021    RBC 4.13 10/06/2021    RBC 4.28 07/06/2021    HGB 10.7 (L) 10/06/2021    HGB 10.6 (L) 07/06/2021    HCT 35.2 10/06/2021    HCT 34.7 (L) 07/06/2021    MCV 85 10/06/2021    MCV 81 07/06/2021    MCH 25.9 (L) 10/06/2021    MCH 24.8 (L) 07/06/2021    MCHC 30.4 (L) 10/06/2021    MCHC 30.5 (L) 07/06/2021    RDW 16.0 (H) 10/06/2021    RDW 18.2 (H) 07/06/2021     10/06/2021     07/06/2021     BMP RESULTS:  Lab Results   Component Value Date     04/19/2022     07/06/2021    POTASSIUM 4.5 04/19/2022 "    POTASSIUM 3.8 07/06/2021    CHLORIDE 109 04/19/2022    CHLORIDE 112 (H) 07/06/2021    CO2 24 04/19/2022    CO2 27 07/06/2021    ANIONGAP 7 04/19/2022    ANIONGAP 3 07/06/2021    GLC 92 04/19/2022     (H) 07/06/2021    BUN 19 04/19/2022    BUN 17 07/06/2021    CR 0.96 04/19/2022    CR 0.97 07/06/2021    GFRESTIMATED 56 (L) 04/19/2022    GFRESTIMATED 52 (L) 07/06/2021    GFRESTBLACK 60 (L) 07/06/2021    BRADLEY 9.6 04/19/2022    BRADLEY 9.3 07/06/2021      INR RESULTS:  Lab Results   Component Value Date    INR 2.2 (A) 04/08/2022    INR 3.8 (A) 04/01/2022    INR 2.3 07/13/2021    INR 2.6 (A) 06/22/2021            Medications     Current Outpatient Medications   Medication Sig Dispense Refill     acetaminophen (TYLENOL) 500 MG tablet Take 500 mg by mouth every 6 hours as needed for mild pain        Carboxymethylcellulose Sod PF (REFRESH PLUS) 0.5 % SOLN ophthalmic solution 1 drop 4 times daily as needed for dry eyes       dexamethasone (DECADRON) 4 MG tablet Take 5 tablets (20 mg) by mouth every 7 days 20 tablet 4     furosemide (LASIX) 20 MG tablet Take 3 (60mg) tablets daily, if weight > 137 pounds take an additional 20mg (1 tablet) 270 tablet 3     latanoprost (XALATAN) 0.005 % ophthalmic solution Place 1 drop into the right eye At Bedtime       lidocaine-prilocaine (EMLA) cream Apply to port site 1 hour prior to access 30 g 1     Multiple Vitamins-Minerals (DAILY MULTIVITAMIN) CAPS Take 1 tablet by mouth daily        nystatin (MYCOSTATIN) 535401 UNIT/GM external cream Apply topically 2 times daily 30 g 0     oxyCODONE (ROXICODONE) 5 MG tablet Take half a pill every 12 hours as needed for pain. 30 tablet 0     polyethylene glycol (MIRALAX/GLYCOLAX) powder Take 17 g by mouth daily as needed for constipation        potassium chloride ER (KLOR-CON M) 20 MEQ CR tablet Take 1 tablet 2 x a day, with an additional 1 tablet on days when you take extra fursoemide 200 tablet 0     SIMBRINZA 1-0.2 % ophthalmic suspension  Place 1 drop into the right eye 2 times daily 1 drop AM and PM  2     sodium fluoride 1.1 % CREA Apply to affected area 3 times daily       triamcinolone (KENALOG) 0.1 % external cream Apply topically 2 times daily 15 g 1     warfarin ANTICOAGULANT (COUMADIN) 4 MG tablet Take 2 mg on Monday/wed/fri and 4 mg all other days or as directed by the INR clinic 90 tablet 1     order for DME Equipment being ordered: Nebulizer with tubes/mask/acessories, use as directed 1 Device 0          Past Medical History     Past Medical History:   Diagnosis Date     Abnormal CXR 2018    then ct done and not significant     Acute diastolic heart failure (H) 03/22/2019    nl ef, 2+mr and tr with severe pulm htn     Ascending aorta dilatation (H) 04/2016    on echo, mild, fu 7/18 4.0, slightly larger     Cancer, metastatic to bone (H)     due to myeloma     Colonic polyp 2008    adenomatous, fu 2013 tics only     Compression fracture 2016    multiple areas of spine     Dry eyes      Elevated MCV 2015    b12 and folic acid nl     HTN (hypertension) 2000    off meds for years     Lung nodule 08/2018    on ct, 4mm, ct done for fu abnl cxr     Menorrhagia 2002    hysteroscopy and d and c done     MGUS (monoclonal gammopathy of unknown significance) 2015    eval by Dr. Roberts     Moderate to severe pulmonary hypertension (H) 03/2019    on echo     Multiple myeloma (H) 2016    dx 5/16 at [facility], bone lesions seen on mri 6/16     OAB (overactive bladder) 2013    Dr. Grullon     Osteoporosis     fu done 2010 and stable, went off meds then, fu done 2013; has had gyn fu and added evista 2013 by gyn     Palpitations 4/16    nl echo, mildly dilated asc aorta     Paroxysmal atrial fibrillation (H) 04/2016    had palp and ziopatch showed it, echo nl lv fxn, mild mr and tr, added coum and toprol, toprol dose raised 12/22/16; hosp 2019 for this 3x, then had av solomon ablation and ppm 7/19     Sciatica of left side 12/13    Dr. Helen Ricardo 2004      SVT (supraventricular tachycardia) (H)     on ziopatch     Thrombocytopenia (H)      Past Surgical History:   Procedure Laterality Date     BONE MARROW BIOPSY, BONE SPECIMEN, NEEDLE/TROCAR N/A 2016    Procedure: BIOPSY BONE MARROW;  Surgeon: Bryan Patel MD;  Location:  GI     CATARACT IOL, RT/LT        SECTION  1965, 1966     COLONOSCOPY  2013    Procedure: COLONOSCOPY;  COLONOSCOPY;  Surgeon: Steffany Rockwell MD;  Location:  GI     EP ABLATION AV NODE N/A 2019    Procedure: EP Ablation AV Node;  Surgeon: Lee Menchaca MD;  Location:  HEART CARDIAC CATH LAB     EP PACEMAKER N/A 2019    Procedure: EP Pacemaker;  Surgeon: Lee Menchaca MD;  Location:  HEART CARDIAC CATH LAB     EXCISE EXOSTOSIS TIBIA / FIBULA  2014    Procedure: EXCISE EXOSTOSIS TIBIA / FIBULA;  Surgeon: Naila Pichardo MD;  Location:  SD     hysteroscopy and d and c      due to bleeding     left anle replacement       right ankle surgery       Family History   Problem Relation Age of Onset     Heart Disease Father      C.A.D. Mother      Cerebrovascular Disease Brother      Family History Negative Sister      Family History Negative Sister      Family History Negative Brother             Allergies   Blood transfusion related (informational only) and Penicillin [penicillins]    40 minutes spent on the date of the encounter doing chart review, history and exam, documentation and further activities as noted above    DAYSI Wolf CNP  [facility]  Pager: [phone]    Thank you for allowing me to participate in the care of your patient.    Sincerely,   DAYSI Wolf CNP   [facility]  cc:   DAYSI Lei CNP  [address]  [place] [address]

## 2022-04-19 NOTE — PROGRESS NOTES
Cardiology Clinic Progress Note  Amira Arreola MRN# [id number]   YOB: 1932 Age: 89 year old   Primary Cardiologist: Dr. Rivera Reason for visit: CORE follow up             Assessment and Plan:   Amira Arreola is a very pleasant 89 year old female with a history of HFpEF, chronic atrial fibrillation, hypertension, mitral regurgitation, tricuspid regurgitation and hx of multiple myeloma.      1.  Chronic diastolic heart failure/HFpEF - Echocardiogram completed 3/23/19 LVEF 55-60%, no wall motion abnormalities, moderate mitral regurgitation, moderate tricuspid regurgitation, and severe pulmonary hypertension.               - NYHA class II-III, stage C              - Dry weight : ~ 135#              - Diuretic regimen : continue furosemide to 60mg daily, additional 20mg for weight gain >2# overnight or increased edema.               - Continue potassium to 20meq BID and additional 20meq on days when she takes an extra furosemide.               - Aldosterone antagonist : none  2. Chronic atrial fibrillation - s/p AV solomon ablation with PPM implantation 7/5/19              - [id number] score 5 (HTN, HF, age, female)              - Continue warfarin for thromboembolic prophylaxis.   3. Moderate mitral regurgitation, moderate tricuspid regurgitation               - Will consider repeat echocardiogram in the future if worsening symptoms, currently won't change any management clinically.  Last echo 3/2019.  4. Hypertension  5. Severe pulmonary hypertension  6. Multiple myeloma with mets to bone - used to follow with Dr. Roberts, who recommended no further monitoring of myeloma.     I saw Amira today for CORE follow up. She is doing well from a heart failure standpoint. She has lost ~ 13# on clinic scale since our last visit, no back to a weight where she used to consistently be at. She shares she has been working on decreasing her portion size. She is compensated and euvolemic on exam. No medication  changes. Support given today. All questions answered.     Changes today: none    Follow up plan:     Follow up with Dr. Rivera end of summer/early fall        History of Presenting Illness:    Amira Arreola is a very pleasant 89 year old female with a history of HFpEF, chronic atrial fibrillation, hypertension, mitral regurgitation, tricuspid regurgitation and hx of multiple myeloma.      Amira had multiple hospitalizations last summer (2019) for atrial fibrillation and decompensated diastolic HF, ultimately undergoing an AV solomon ablation with PPM 7/5/19. Echocardiogram completed 3/23/19 LVEF 55-60%, no wall motion abnormalities, moderate mitral regurgitation, moderate tricuspid regurgitation, and severe pulmonary hypertension.      Patient has been following closely in CORE clinic. She saw Dr. Rivera in April 2021 at which time no medications were changed. Weight was stable ~ 150#.      She saw Dr. Roberts in October for her slowly progressing multiple myeloma at which time it  patient had increasing weakness/confusion, noting patient would not be a candidate for myeloma-directed therapy due to her overall decline and recommended no further myeloma monitoring and recommended follow up as needed.      I last saw her for CORE follow up in December at which time she was doing well from a heart failure standpoint, no medication changes were made. Weight was slightly up in clinic. She was resistant to adjustments in her diuretics due to increased urination, stating it affects her quality of life.     Patient is here today for CORE follow up.     Patient reports feeling good. Monitoring weights daily a home. Home weight is down to 136# which is down from ~ 140# the beginning of March. Clinic weight 142# which is down ~ 13#. States she has been trying to monitor her portion size and thinks this accounts for weight loss. Denies shortness of breath at rest. Denies exertional dyspnea. Denies orthopnea or PND. Sleeping good.  Denies chest pain or chest tightness. Denies dizziness, lightheadedness or other presyncopal symptoms. Denies tachycardia or palpitations. Denies any falls. Taking medications daily.     Labs today show stable kidney function and electrolytes, creatinine 0.96. Blood pressure 121/68 and HR 73 in clinic today.    Appetite good. Children help with meals. Ordering some meals out. Drinking 1-2 diet cokes, 1 cup coffee, 1 cup milk, 1-2 glasses water. No set exercise routine. Using walker and wheelchair for support. Denies tobacco or alcohol use. Has a home health care aide 5 days per week.         Social History    ,  -  passed away Summer 2021, 6 children. Retired nurse.   Social History     Socioeconomic History     Marital status:      Spouse name: Tom     Number of children: 6     Years of education: Not on file     Highest education level: Not on file   Occupational History     Occupation: rn anesthetist     Employer: RETIRED   Tobacco Use     Smoking status: Never Smoker     Smokeless tobacco: Never Used   Substance and Sexual Activity     Alcohol use: No     Alcohol/week: 0.0 standard drinks     Drug use: No     Sexual activity: Never   Other Topics Concern     Parent/sibling w/ CABG, MI or angioplasty before 65F 55M? Not Asked   Social History Narrative     Not on file     Social Determinants of Health     Financial Resource Strain: Not on file   Food Insecurity: Not on file   Transportation Needs: Not on file   Physical Activity: Not on file   Stress: Not on file   Social Connections: Not on file   Intimate Partner Violence: Not on file   Housing Stability: Not on file            Review of Systems:   Skin:  Positive for bruising   Eyes:  Positive for glaucoma  ENT:  Negative    Respiratory:  Positive for cough  Cardiovascular:    Positive for;edema  Gastroenterology: Negative    Genitourinary:  Positive for urinary frequency  Musculoskeletal:  Negative    Neurologic:  Negative   "  Psychiatric:  Negative    Heme/Lymph/Imm:  Positive for allergies  Endocrine:  Negative           Physical Exam:   Vitals: /68   Pulse 73   Ht 1.651 m (5' 5\")   Wt 64.5 kg (142 lb 1.6 oz)   SpO2 100%   BMI 23.65 kg/m     Wt Readings from Last 4 Encounters:   04/19/22 64.5 kg (142 lb 1.6 oz)   02/10/22 70.3 kg (155 lb)   12/16/21 70.1 kg (154 lb 9.6 oz)   08/24/21 65.8 kg (145 lb)     GEN: well nourished, in no acute distress.  HEENT:  Pupils equal, round. Sclerae nonicteric.   NECK: Supple, no masses appreciated. JVP appears normal.   C/V:  Regular rate and rhythm, no murmur, rub or gallop.    RESP: Respirations are unlabored. Clear to auscultation bilaterally without wheezing, rales, or rhonchi.  GI: Abdomen soft, nontender.  EXTREM: No LE edema.  NEURO: Alert and oriented, cooperative.  SKIN: Warm and dry.       Data:     LIPID RESULTS:  Lab Results   Component Value Date    CHOL 160 10/12/2016    HDL 76 10/12/2016    LDL 71 10/12/2016    TRIG 66 10/12/2016    CHOLHDLRATIO 2.3 09/21/2015     LIVER ENZYME RESULTS:  Lab Results   Component Value Date    AST 20 01/06/2021    ALT 19 01/06/2021     CBC RESULTS:  Lab Results   Component Value Date    WBC 7.9 10/06/2021    WBC 7.3 07/06/2021    RBC 4.13 10/06/2021    RBC 4.28 07/06/2021    HGB 10.7 (L) 10/06/2021    HGB 10.6 (L) 07/06/2021    HCT 35.2 10/06/2021    HCT 34.7 (L) 07/06/2021    MCV 85 10/06/2021    MCV 81 07/06/2021    MCH 25.9 (L) 10/06/2021    MCH 24.8 (L) 07/06/2021    MCHC 30.4 (L) 10/06/2021    MCHC 30.5 (L) 07/06/2021    RDW 16.0 (H) 10/06/2021    RDW 18.2 (H) 07/06/2021     10/06/2021     07/06/2021     BMP RESULTS:  Lab Results   Component Value Date     04/19/2022     07/06/2021    POTASSIUM 4.5 04/19/2022    POTASSIUM 3.8 07/06/2021    CHLORIDE 109 04/19/2022    CHLORIDE 112 (H) 07/06/2021    CO2 24 04/19/2022    CO2 27 07/06/2021    ANIONGAP 7 04/19/2022    ANIONGAP 3 07/06/2021    GLC 92 04/19/2022    GLC " 114 (H) 07/06/2021    BUN 19 04/19/2022    BUN 17 07/06/2021    CR 0.96 04/19/2022    CR 0.97 07/06/2021    GFRESTIMATED 56 (L) 04/19/2022    GFRESTIMATED 52 (L) 07/06/2021    GFRESTBLACK 60 (L) 07/06/2021    BRADLEY 9.6 04/19/2022    BRADLEY 9.3 07/06/2021      INR RESULTS:  Lab Results   Component Value Date    INR 2.2 (A) 04/08/2022    INR 3.8 (A) 04/01/2022    INR 2.3 07/13/2021    INR 2.6 (A) 06/22/2021            Medications     Current Outpatient Medications   Medication Sig Dispense Refill     acetaminophen (TYLENOL) 500 MG tablet Take 500 mg by mouth every 6 hours as needed for mild pain        Carboxymethylcellulose Sod PF (REFRESH PLUS) 0.5 % SOLN ophthalmic solution 1 drop 4 times daily as needed for dry eyes       dexamethasone (DECADRON) 4 MG tablet Take 5 tablets (20 mg) by mouth every 7 days 20 tablet 4     furosemide (LASIX) 20 MG tablet Take 3 (60mg) tablets daily, if weight > 137 pounds take an additional 20mg (1 tablet) 270 tablet 3     latanoprost (XALATAN) 0.005 % ophthalmic solution Place 1 drop into the right eye At Bedtime       lidocaine-prilocaine (EMLA) cream Apply to port site 1 hour prior to access 30 g 1     Multiple Vitamins-Minerals (DAILY MULTIVITAMIN) CAPS Take 1 tablet by mouth daily        nystatin (MYCOSTATIN) 898624 UNIT/GM external cream Apply topically 2 times daily 30 g 0     oxyCODONE (ROXICODONE) 5 MG tablet Take half a pill every 12 hours as needed for pain. 30 tablet 0     polyethylene glycol (MIRALAX/GLYCOLAX) powder Take 17 g by mouth daily as needed for constipation        potassium chloride ER (KLOR-CON M) 20 MEQ CR tablet Take 1 tablet 2 x a day, with an additional 1 tablet on days when you take extra fursoemide 200 tablet 0     SIMBRINZA 1-0.2 % ophthalmic suspension Place 1 drop into the right eye 2 times daily 1 drop AM and PM  2     sodium fluoride 1.1 % CREA Apply to affected area 3 times daily       triamcinolone (KENALOG) 0.1 % external cream Apply topically 2 times  daily 15 g 1     warfarin ANTICOAGULANT (COUMADIN) 4 MG tablet Take 2 mg on Monday/wed/fri and 4 mg all other days or as directed by the INR clinic 90 tablet 1     order for DME Equipment being ordered: Nebulizer with tubes/mask/acessories, use as directed 1 Device 0          Past Medical History     Past Medical History:   Diagnosis Date     Abnormal CXR 2018    then ct done and not significant     Acute diastolic heart failure (H) 03/22/2019    nl ef, 2+mr and tr with severe pulm htn     Ascending aorta dilatation (H) 04/2016    on echo, mild, fu 7/18 4.0, slightly larger     Cancer, metastatic to bone (H)     due to myeloma     Colonic polyp 2008    adenomatous, fu 2013 tics only     Compression fracture 2016    multiple areas of spine     Dry eyes      Elevated MCV 2015    b12 and folic acid nl     HTN (hypertension) 2000    off meds for years     Lung nodule 08/2018    on ct, 4mm, ct done for fu abnl cxr     Menorrhagia 2002    hysteroscopy and d and c done     MGUS (monoclonal gammopathy of unknown significance) 2015    eval by Dr. Robetrs     Moderate to severe pulmonary hypertension (H) 03/2019    on echo     Multiple myeloma (H) 2016    dx 5/16 at [place], bone lesions seen on mri 6/16     OAB (overactive bladder) 2013    Dr. Grullon     Osteoporosis     fu done 2010 and stable, went off meds then, fu done 2013; has had gyn fu and added evista 2013 by gyn     Palpitations 4/16    nl echo, mildly dilated asc aorta     Paroxysmal atrial fibrillation (H) 04/2016    had palp and ziopatch showed it, echo nl lv fxn, mild mr and tr, added coum and toprol, toprol dose raised 12/22/16; hosp 2019 for this 3x, then had av solomon ablation and ppm 7/19     Sciatica of left side 12/13    Dr. Helen Ricardo 2004     SVT (supraventricular tachycardia) (H) 4/16    on ziopatch     Thrombocytopenia (H) 2014     Past Surgical History:   Procedure Laterality Date     BONE MARROW BIOPSY, BONE SPECIMEN, NEEDLE/TROCAR N/A  2016    Procedure: BIOPSY BONE MARROW;  Surgeon: Bryan Patel MD;  Location:  GI     CATARACT IOL, RT/LT  2003      SECTION  1965, 1966     COLONOSCOPY  2013    Procedure: COLONOSCOPY;  COLONOSCOPY;  Surgeon: Steffany Rockwell MD;  Location:  GI     EP ABLATION AV NODE N/A 2019    Procedure: EP Ablation AV Node;  Surgeon: Lee Menchaca MD;  Location:  [facility]     EP PACEMAKER N/A 2019    Procedure: EP Pacemaker;  Surgeon: Lee Menchaca MD;  Location:  [facility]     EXCISE EXOSTOSIS TIBIA / FIBULA  2014    Procedure: EXCISE EXOSTOSIS TIBIA / FIBULA;  Surgeon: Naila Pichardo MD;  Location:  SD     hysteroscopy and d and c      due to bleeding     left anle replacement       right ankle surgery       Family History   Problem Relation Age of Onset     Heart Disease Father      C.A.D. Mother      Cerebrovascular Disease Brother      Family History Negative Sister      Family History Negative Sister      Family History Negative Brother             Allergies   Blood transfusion related (informational only) and Penicillin [penicillins]    40 minutes spent on the date of the encounter doing chart review, history and exam, documentation and further activities as noted above    DAYSI Wolf [facility]  Pager: [phone]

## 2022-04-19 NOTE — PATIENT INSTRUCTIONS
No medication changes  Continue to monitor weight daily at home  Notify clinic of any questions/concerns [phone]  Follow up with Dr. Rivera end of summer/early fall

## 2022-04-20 NOTE — PROGRESS NOTES
ANTICOAGULATION FOLLOW-UP CLINIC VISIT    Patient Name:  Amira Arreola  Date:  8/25/2017  Contact Type:  Face to Face    SUBJECTIVE:     Patient Findings     Positives Change in diet/appetite    Comments Due to diarrhea, has not been eating well in the past few day.           OBJECTIVE    INR Protime   Date Value Ref Range Status   08/25/2017 3.8 (A) 0.86 - 1.14 Final       ASSESSMENT / PLAN  INR assessment SUPRA    Recheck INR In: 6 DAYS    INR Location Clinic      Anticoagulation Summary as of 8/25/2017     INR goal 2.0-3.0   Today's INR 3.8!   Maintenance plan 6 mg (4 mg x 1.5) on Mon, Wed, Fri; 4 mg (4 mg x 1) all other days   Full instructions 8/25: 2 mg; 8/30: 4 mg; Otherwise 6 mg on Mon, Wed, Fri; 4 mg all other days   Weekly total 34 mg   Plan last modified Dayana Barrera RN (3/10/2017)   Next INR check 8/31/2017   Target end date Indefinite    Indications   Long-term (current) use of anticoagulants [Z79.01] [Z79.01]  Atrial fibrillation (H) [I48.91] (Resolved) [I48.91]         Anticoagulation Episode Summary     INR check location     Preferred lab     Send INR reminders to [facility]    Comments       Anticoagulation Care Providers     Provider Role Specialty Phone number    Addy Frias MD [facility] Internal Medicine [phone]            See the Encounter Report to view Anticoagulation Flowsheet and Dosing Calendar (Go to Encounters tab in chart review, and find the Anticoagulation Therapy Visit)    Dosage adjustment made based on physician directed care plan.    INR of 3.8 today.  Poor intake in the past few days due to diarrhea.  Will slowly resume regular diet.    Decreased 6 mg weekly.  Recheck in 6 days.      Dayana Barrera RN               
50

## 2022-04-22 NOTE — PROGRESS NOTES
ANTICOAGULATION MANAGEMENT     Amira Arreola 89 year old female is on warfarin with subtherapeutic INR result. (Goal INR 2.0-3.0)    Recent labs: (last 7 days)     04/22/22  1033   INR 1.6*       ASSESSMENT       Source(s): Chart Review and Home Care/Facility Nurse       Warfarin doses taken: Warfarin taken as instructed    Diet: Decreased greens/vitamin K in diet; plans to resume previous intake    New illness, injury, or hospitalization: No    Medication/supplement changes: None noted    Signs or symptoms of bleeding or clotting: No    Previous INR: Therapeutic last visit; previously outside of goal range    Additional findings: None       PLAN     Recommended plan for temporary change(s) affecting INR     Dosing Instructions: booster dose then continue your current warfarin dose with next INR in 2 weeks       Summary  As of 4/22/2022    Full warfarin instructions:  4/22: 4 mg; Otherwise 4 mg every Tue, Thu, Sat; 2 mg all other days   Next INR check:  5/6/2022             Telephone call with Aneesh home care/facility nurse who agrees to plan and repeated back plan correctly    Orders given to  Homecare nurse/facility to recheck    Education provided: Importance of consistent vitamin K intake and Impact of vitamin K foods on INR    Plan made per [facility] anticoagulation protocol    Senia Sanchez RN  Anticoagulation Clinic  4/22/2022    _______________________________________________________________________     Anticoagulation Episode Summary     Current INR goal:  2.0-3.0   TTR:  86.9 % (1 y)   Target end date:  Indefinite   Send INR reminders to:  DANTE SUMNER    Indications    Long term current use of anticoagulant therapy [Z79.01]  Atrial fibrillation (H) [I48.91] (Resolved) [I48.91]  Paroxysmal atrial fibrillation (H) [I48.0]           Comments:   Rajiv HENSLEY [facility] [phone] private pay nursing visits to check INR         Anticoagulation Care Providers     Provider Role Specialty Phone number    Altagracia,  Addy Estrada MD [facility] Internal Medicine [phone]

## 2022-05-06 NOTE — PROGRESS NOTES
ANTICOAGULATION MANAGEMENT     Amira Arreola 89 year old female is on warfarin with therapeutic INR result. (Goal INR 2.0-3.0)    Recent labs: (last 7 days)     05/06/22  0901   INR 2.2*       ASSESSMENT       Source(s): Chart Review and Home Care/Facility Nurse       Warfarin doses taken: Warfarin taken as instructed    Diet: No new diet changes identified    New illness, injury, or hospitalization: No    Medication/supplement changes: None noted    Signs or symptoms of bleeding or clotting: No    Previous INR: Subtherapeutic    Additional findings: None       PLAN     Recommended plan for no diet, medication or health factor changes affecting INR     Dosing Instructions: continue your current warfarin dose with next INR in 2 weeks       Summary  As of 5/6/2022    Full warfarin instructions:  4 mg every Tue, Thu, Sat; 2 mg all other days   Next INR check:  5/20/2022             Telephone call with Gerardo home care/facility nurse who verbalizes understanding and agrees to plan    Orders given to  Homecare nurse/facility to recheck    Education provided: Goal range and significance of current result    Plan made per [facility] anticoagulation protocol    Ernst Carlson RN  Anticoagulation Clinic  5/6/2022    _______________________________________________________________________     Anticoagulation Episode Summary     Current INR goal:  2.0-3.0   TTR:  85.9 % (1 y)   Target end date:  Indefinite   Send INR reminders to:  DANTE SUMNER    Indications    Long term current use of anticoagulant therapy [Z79.01]  Atrial fibrillation (H) [I48.91] (Resolved) [I48.91]  Paroxysmal atrial fibrillation (H) [I48.0]           Comments:   Rajiv HENSLEY [facility] [phone] private pay nursing visits to check INR         Anticoagulation Care Providers     Provider Role Specialty Phone number    Addy Frias MD Referring Internal Medicine [phone]

## 2022-05-20 NOTE — PROGRESS NOTES
ANTICOAGULATION MANAGEMENT     Amira Arreola 89 year old female is on warfarin with subtherapeutic INR result. (Goal INR 2.0-3.0)    Recent labs: (last 7 days)     05/20/22  1155   INR 1.8*       ASSESSMENT       Source(s): Chart Review and Home Care/Facility Nurse       Warfarin doses taken: Warfarin taken as instructed    Diet: Increased greens/vitamin K in diet; plans to resume previous intake    New illness, injury, or hospitalization: No    Medication/supplement changes: None noted    Signs or symptoms of bleeding or clotting: No    Previous INR: Therapeutic last visit; previously outside of goal range    Additional findings: None       PLAN     Recommended plan for temporary change(s) affecting INR     Dosing Instructions: booster dose then continue your current warfarin dose with next INR in 2 weeks       Summary  As of 5/20/2022    Full warfarin instructions:  5/20: 4 mg; Otherwise 4 mg every Tue, Thu, Sat; 2 mg all other days   Next INR check:  6/3/2022             Telephone call with Amira who agrees to plan and repeated back plan correctly    Orders given to  Homecare nurse/facility to recheck    Education provided: Importance of consistent vitamin K intake, Impact of vitamin K foods on INR, Vitamin K content of foods and Contact [phone]  with any changes, questions or concerns.     Plan made per [facility] anticoagulation protocol    Jenifer Schultz RN  Anticoagulation Clinic  5/20/2022    _______________________________________________________________________     Anticoagulation Episode Summary     Current INR goal:  2.0-3.0   TTR:  84.0 % (1 y)   Target end date:  Indefinite   Send INR reminders to:  DANTE SUMNER    Indications    Long term current use of anticoagulant therapy [Z79.01]  Atrial fibrillation (H) [I48.91] (Resolved) [I48.91]  Paroxysmal atrial fibrillation (H) [I48.0]           Comments:   Rajiv HENSLEY [facility] [phone] private pay nursing visits to check INR         Anticoagulation Care  Providers     Provider Role Specialty Phone number    Addy Frias MD Referring Internal Medicine [phone]          Jenifer Henderson RN, BSN, PHN  Anticoagulation Nurse  [phone]

## 2022-06-01 NOTE — PROGRESS NOTES
ANTICOAGULATION MANAGEMENT     Amira Arreola 89 year old female is on warfarin with therapeutic INR result. (Goal INR 2.0-3.0)    Recent labs: (last 7 days)     06/01/22  1129   INR 2.7*       ASSESSMENT       Source(s): Chart Review and Home Care/Facility Nurse       Warfarin doses taken: Warfarin taken as instructed    Diet: No new diet changes identified    New illness, injury, or hospitalization: No    Medication/supplement changes: None noted    Signs or symptoms of bleeding or clotting: No    Previous INR: Subtherapeutic    Additional findings: None       PLAN     Recommended plan for no diet, medication or health factor changes affecting INR     Dosing Instructions: continue your current warfarin dose with next INR in 2 weeks       Summary  As of 6/1/2022    Full warfarin instructions:  4 mg every Tue, Thu, Sat; 2 mg all other days   Next INR check:  6/15/2022             Telephone call with Gerardo home care/facility nurse who verbalizes understanding and agrees to plan    Orders given to  Homecare nurse/facility to recheck    Education provided: Please call back if any changes to your diet, medications or how you've been taking warfarin    Plan made per [facility] anticoagulation protocol    Suyapa Walton RN  Anticoagulation Clinic  6/1/2022    _______________________________________________________________________     Anticoagulation Episode Summary     Current INR goal:  2.0-3.0   TTR:  84.0 % (1 y)   Target end date:  Indefinite   Send INR reminders to:  DANTE SUMNER    Indications    Long term current use of anticoagulant therapy [Z79.01]  Atrial fibrillation (H) [I48.91] (Resolved) [I48.91]  Paroxysmal atrial fibrillation (H) [I48.0]           Comments:   Rajiv HENSLEY [facility] [phone] private pay nursing visits to check INR         Anticoagulation Care Providers     Provider Role Specialty Phone number    Addy Frias MD Referring Internal Medicine [phone]

## 2022-06-01 NOTE — TELEPHONE ENCOUNTER
Received a call from [facility], requesting verbal orders for skilled RN visits.  Frequency: once every 2 weeks (directed by anticoagulation)  Reason: INR blood draws    Routing to PCP for approval as patient has not been seen in the clinic by Dr. Frias since 4/13/2021.    Can we leave a detailed message on this number? YES  Phone number patient can be reached at: Other phone number:  [phone]    Kymberly Browning RN  [facility] Triage

## 2022-06-02 NOTE — TELEPHONE ENCOUNTER
Per chart review, HC was ordered by GEETHA Dennison 02/10/22. Left voice message asking Gerardo to call triage back and confirm if he has a new referral for home care from a different provider. If no new referral and orders are a continuation of 02/10/2022 order, please send message to Aleksandar Dennison for approval of HC visits.

## 2022-06-02 NOTE — TELEPHONE ENCOUNTER
The Home Care/Assisted Living/Nursing Facility is calling regarding an established patient.  Has the patient seen Home Care in the past or is currently residing in Assisted Living or Nursing Facility? Yes.     Gerardo calling from [facility] requesting the following orders that are within the Home Care, Assisted Living or Nursing Home Eval and Treatment standing order and can be signed as standing order signature required by RN.    Preferred Call Back Number: [phone]    Home Care Visits Continuation - Skilled nursing visits every 2 weeks or as needed per anticoagulation clinic.     Any additional Orders:  Are there any orders requested, not stated above, that are outside of the standing order and must be routed to a licensed practitioner for approval?    No    Writer has verified Requestor will send fax to have orders signed.  Eufemia Boateng RN

## 2022-06-15 NOTE — PROGRESS NOTES
ANTICOAGULATION MANAGEMENT     Amira Arreola 89 year old female is on warfarin with therapeutic INR result. (Goal INR 2.0-3.0)    Recent labs: (last 7 days)     06/15/22  1113   INR 2.2*       ASSESSMENT       Source(s): Chart Review and Home Care/Facility Nurse       Warfarin doses taken: Warfarin taken as instructed    Diet: decrease in appetite    New illness, injury, or hospitalization: Yes: fell yesterday, skin tear right wrist, some bruising    Medication/supplement changes: None noted    Signs or symptoms of bleeding or clotting: Yes:  skin tear right wrist, some bruising    Previous INR: Therapeutic last visit; previously outside of goal range    Additional findings: None       PLAN     Recommended plan for no diet, medication or health factor changes affecting INR     Dosing Instructions: continue your current warfarin dose with next INR in 2 weeks       Summary  As of 6/15/2022    Full warfarin instructions:  4 mg every Tue, Thu, Sat; 2 mg all other days   Next INR check:  6/29/2022             Telephone call with [facility] home care/facility nurse who verbalizes understanding and agrees to plan    Orders given to  Homecare nurse/facility to recheck    Education provided: None required    Plan made per [facility] anticoagulation protocol    Fanny Tolbert RN  Anticoagulation Clinic  6/15/2022    _______________________________________________________________________     Anticoagulation Episode Summary     Current INR goal:  2.0-3.0   TTR:  84.0 % (1 y)   Target end date:  Indefinite   Send INR reminders to:  DANTE SUMNER    Indications    Long term current use of anticoagulant therapy [Z79.01]  Atrial fibrillation (H) [I48.91] (Resolved) [I48.91]  Paroxysmal atrial fibrillation (H) [I48.0]           Comments:   Rajiv HENSLEY [facility] [phone] private pay nursing visits to check INR         Anticoagulation Care Providers     Provider Role Specialty Phone number    Addy Frias MD Referring Internal  Medicine [phone]

## 2022-06-15 NOTE — TELEPHONE ENCOUNTER
ANTICOAGULATION MANAGEMENT      Amira Arreola due for annual renewal of referral to anticoagulation monitoring. Order pended for your review and signature.      ANTICOAGULATION SUMMARY      Warfarin indication(s)     Atrial fibrillation    Heart valve present?  NO       Current goal range   INR: 2.0-3.0     Goal appropriate for indication? Yes, INR 2-3 appropriate for hx of DVT, PE, hypercoagulable state, Afib, LVAD, or bileaflet AVR without risk factors     Current duration of therapy Indefinite/long term therapy   Time in Therapeutic Range (TTR)  (Goal > 60%) 84.0%       Office visit with referring provider's group within last year yes on 02/10/2022       Fanny Tolbert, RN

## 2022-06-28 NOTE — PROGRESS NOTES
VM Message left at 9:31 am from Jackelin Ac  with INR of 3.1. Patient started 1/2 bottle of ensure with evening meals. Please call [phone]  Tameka Perez, RN  Anticoagulation Nurse - [facility], [place]

## 2022-06-28 NOTE — PROGRESS NOTES
ANTICOAGULATION MANAGEMENT     Amira Arreola 89 year old female is on warfarin with supratherapeutic INR result. (Goal INR 2.0-3.0)    Recent labs: (last 7 days)     06/28/22  1056   INR 3.1*       ASSESSMENT       Source(s): Chart Review and Home Care/Facility Nurse       Warfarin doses taken: Warfarin taken as instructed    Diet: Started drinking 1/2 bottle of ensure/daily this week. Will increase to a full bottle daily. This may cause INR to decrease due to vitamin K content.    Decreased appetite.    New illness, injury, or hospitalization: No    Medication/supplement changes: None noted    Signs or symptoms of bleeding or clotting: No    Previous INR: Therapeutic last 2(+) visits    Additional findings: None       PLAN     Recommended plan for temporary change(s) affecting INR     Dosing Instructions: continue your current warfarin dose with next INR in 8 days     Summary  As of 6/28/2022    Full warfarin instructions:  4 mg every Tue, Thu, Sat; 2 mg all other days   Next INR check:  7/6/2022             Telephone call with [facility] home care/facility nurse who verbalizes understanding and agrees to plan    Orders given to  Homecare nurse/facility to recheck    Education provided: Goal range and significance of current result    Plan made per [facility] anticoagulation protocol    Ernst Carlson RN  Anticoagulation Clinic  6/28/2022    _______________________________________________________________________     Anticoagulation Episode Summary     Current INR goal:  2.0-3.0   TTR:  83.6 % (1 y)   Target end date:  Indefinite   Send INR reminders to:  DANTE SUMNER    Indications    Long term current use of anticoagulant therapy [Z79.01]  Atrial fibrillation (H) [I48.91] (Resolved) [I48.91]  Paroxysmal atrial fibrillation (H) [I48.0]           Comments:   Rajiv HENSLEY [facility] [phone] private pay nursing visits to check INR         Anticoagulation Care Providers     Provider Role Specialty Phone number    Addy Frias  MD [facility] Internal Medicine [phone]

## 2022-06-29 NOTE — TELEPHONE ENCOUNTER
DaughterYesi (C2C), calling with patient update and requesting an in-home assessment for further help within the home.   Daughter asking what services are available to patient that will Medicare cover and is Hospice an option?      Patient is declining at home.   -10lb weight loss in last month/ 15lb total in last 2 months  -Dementia is advancing  -Increased difficulty with orientation to day/place   -Back pain worsening  -Patient is walking sometimes, but noticing over last few weeks a decline in mobility. Pt fatigues quicker, is using wheelchair when too fatigued to walk.  -Decreased appetite- averaging 2 meals and 1 Ensure daily        DaughterYesi, requesting a call back with providers response  Can we leave a detailed message on this number? YES  Phone number patient can be reached at: [phone]    Ariane Charles RN  [facility] Triage

## 2022-06-30 NOTE — PROGRESS NOTES
This is a very pleasant 89-year-old with multiple medical problems here with 2 of her children.  Her son whom she lives with and has lived with for the last 2 years as well as one of her daughters.  Her other daughter who is an ophthalmologist is not currently here.    The patient presents for issues including weight loss, fatigue, difficulty walking, more confusion and memory issues as well as low back pain.    The patient has a significant medical history including metastatic myeloma for which she was already seeing oncology but no longer follows with them per his direction.  Additionally significant is that she lost her  several months ago.    The patient has had significant documented weight loss as noted.  She also has low back pain which is chronic.  In speaking with her overall she notes she has low back pain but no other pains.  No headaches.  No chest pain or shortness of breath.  No abdominal pain or nausea.  She does have heart failure but this is not recently been a known issue.    The family does note that she does have low back pain which is gotten worse after a fall a few weeks ago.  It is not radiating.  No incontinence of bowels but some urge incontinence of bladder.  No fevers or night sweats.  She does not smoke and does not drink.  I review of systems is otherwise negative    Past Medical History:   Diagnosis Date     Abnormal CXR 2018    then ct done and not significant     Acute diastolic heart failure (H) 03/22/2019    nl ef, 2+mr and tr with severe pulm htn     Ascending aorta dilatation (H) 04/2016    on echo, mild, fu 7/18 4.0, slightly larger     Cancer, metastatic to bone (H)     due to myeloma     Colonic polyp 2008    adenomatous, fu 2013 tics only     Compression fracture 2016    multiple areas of spine     Dry eyes      Elevated MCV 2015    b12 and folic acid nl     HTN (hypertension) 2000    off meds for years     Lung nodule 08/2018    on ct, 4mm, ct done for fu abnl cxr      Menorrhagia     hysteroscopy and d and c done     MGUS (monoclonal gammopathy of unknown significance)     eval by Dr. Roberts     Moderate to severe pulmonary hypertension (H) 2019    on echo     Multiple myeloma (H) 2016    dx  at [facility], bone lesions seen on mri      OAB (overactive bladder)     Dr. Grullon     Osteoporosis     fu done  and stable, went off meds then, fu done ; has had gyn fu and added evista  by gyn     Palpitations     nl echo, mildly dilated asc aorta     Paroxysmal atrial fibrillation (H) 2016    had palp and ziopatch showed it, echo nl lv fxn, mild mr and tr, added coum and toprol, toprol dose raised 16; hosp  for this 3x, then had av solomon ablation and ppm      Sciatica of left side     Dr. Helen Ricardo 2004     SVT (supraventricular tachycardia) (H)     on ziopatch     Thrombocytopenia (H)      Past Surgical History:   Procedure Laterality Date     BONE MARROW BIOPSY, BONE SPECIMEN, NEEDLE/TROCAR N/A 2016    Procedure: BIOPSY BONE MARROW;  Surgeon: Bryan Patel MD;  Location:  GI     CATARACT IOL, RT/LT        SECTION  1965, 1966     COLONOSCOPY  2013    Procedure: COLONOSCOPY;  COLONOSCOPY;  Surgeon: Steffany Rockwell MD;  Location:  GI     EP ABLATION AV NODE N/A 2019    Procedure: EP Ablation AV Node;  Surgeon: Lee Menchaca MD;  Location:  HEART CARDIAC CATH LAB     EP PACEMAKER N/A 2019    Procedure: EP Pacemaker;  Surgeon: Lee Menchaca MD;  Location:  HEART CARDIAC CATH LAB     EXCISE EXOSTOSIS TIBIA / FIBULA  2014    Procedure: EXCISE EXOSTOSIS TIBIA / FIBULA;  Surgeon: Naila Pichardo MD;  Location:  SD     hysteroscopy and d and c      due to bleeding     left anle replacement       right ankle surgery       Social History     Socioeconomic History     Marital status:      Spouse name: Tom     Number of children: 6      Years of education: Not on file     Highest education level: Not on file   Occupational History     Occupation: rn anesthetist     Employer: RETIRED   Tobacco Use     Smoking status: Never Smoker     Smokeless tobacco: Never Used   Substance and Sexual Activity     Alcohol use: No     Alcohol/week: 0.0 standard drinks     Drug use: No     Sexual activity: Never   Other Topics Concern     Parent/sibling w/ CABG, MI or angioplasty before 65F 55M? Not Asked   Social History Narrative     Not on file     Social Determinants of Health     Financial Resource Strain: Not on file   Food Insecurity: Not on file   Transportation Needs: Not on file   Physical Activity: Not on file   Stress: Not on file   Social Connections: Not on file   Intimate Partner Violence: Not on file   Housing Stability: Not on file     Current Outpatient Medications   Medication Sig Dispense Refill     acetaminophen (TYLENOL) 500 MG tablet Take 500 mg by mouth every 6 hours as needed for mild pain        Carboxymethylcellulose Sod PF (REFRESH PLUS) 0.5 % SOLN ophthalmic solution 1 drop 4 times daily as needed for dry eyes       furosemide (LASIX) 20 MG tablet Take 3 (60mg) tablets daily, if weight > 137 pounds take an additional 20mg (1 tablet) 270 tablet 1     HYDROcodone-acetaminophen (NORCO) 5-325 MG tablet Take 1 tablet by mouth every 6 hours as needed for severe pain 20 tablet 0     latanoprost (XALATAN) 0.005 % ophthalmic solution Place 1 drop into the right eye At Bedtime       lidocaine-prilocaine (EMLA) cream Apply to port site 1 hour prior to access 30 g 1     Multiple Vitamins-Minerals (DAILY MULTIVITAMIN) CAPS Take 1 tablet by mouth daily        nystatin (MYCOSTATIN) 821018 UNIT/GM external cream Apply topically 2 times daily 30 g 0     order for DME Equipment being ordered: Nebulizer with tubes/mask/acessories, use as directed 1 Device 0     potassium chloride ER (KLOR-CON M) 20 MEQ CR tablet Take 1 tablet 2 x a day, with an  "additional 1 tablet on days when you take extra fursoemide 200 tablet 0     SIMBRINZA 1-0.2 % ophthalmic suspension Place 1 drop into the right eye 2 times daily 1 drop AM and PM  2     sodium fluoride 1.1 % CREA Apply to affected area 3 times daily       triamcinolone (KENALOG) 0.1 % external cream Apply topically 2 times daily 15 g 1     warfarin ANTICOAGULANT (COUMADIN) 4 MG tablet Take 2 mg on Monday/wed/fri and 4 mg all other days or as directed by the INR clinic 90 tablet 1     Allergies   Allergen Reactions     Blood Transfusion Related (Informational Only)      Blood antigens related to receiving Darzelex-AG     Penicillin [Penicillins] Rash     Blotches on chest      FAMILY HISTORY NOTED AND REVIEWED    REVIEW OF SYSTEMS: above    PHYSICAL EXAM    /83 (BP Location: Right arm, Patient Position: Chair, Cuff Size: Adult Regular)   Pulse 75   Resp 16   Ht 1.651 m (5' 5\")   Wt 56.7 kg (125 lb)   SpO2 97%   Breastfeeding No   BMI 20.80 kg/m      Patient appears non toxic  Is able to stand without any significant assistance.  She is able to get on the table with help of 2.  In terms of the gait is very slow and somewhat shuffling.  No supraclavicular, cervical or axillary lymphadenopathy  Mouth and eyes within normal limits  Neck within normal limits  Lungs clear  cv irreg, no murmer, rub or gallop, no jvp, trace ankle edema  Abdomen normal active bowel sounds, soft non-tender, no mgr, no hepatosplenomegaly    Labs sent    This is a very complicated patient who is not doing well with multiple issues including weight loss, memory loss, fatigue, and failure to thrive    Suspect much of this is due to to some dementia.  I also think some of it is due to her myeloma as well as her chronic low back pain.  She does have heart failure but this does not appear to be particularly active.  I doubt other cause but I will check labs to see.  I discussed with the patient and her end-of-life goals and she seems to " indicate she certainly would like to be comfortable.  I am not sure that there is much were going to do or find via testing that would affect that.  However we certainly could look into things further.  I do not think she has cauda equina syndrome.    PLAN:  She will discuss with her kids end of life cares, code status  Labs now  Try scheduled tylenol tid  vicodin prn, patient and family understands the risks.  Follow up v.v 4 weeks  Call if changes  Change lasix to once daily and kcl to once daily    Addy Frias M.D.

## 2022-07-01 PROBLEM — E43 UNSPECIFIED SEVERE PROTEIN-CALORIE MALNUTRITION (H): Status: ACTIVE | Noted: 2022-01-01

## 2022-07-01 NOTE — PATIENT INSTRUCTIONS
Cut the lasix dose to just one pill daily  Cut the potassium to just one pill daily  Use tylenol scheduled 3x daily  Use the vicodin as needed for pain  Be sure to discuss end of life cares/dnr/dni  I will send you the labs

## 2022-07-04 NOTE — RESULT ENCOUNTER NOTE
It was nice to see you.  Your should be able to view the lab results.    For the most part your labs are fine and do not show a cause for your weight loss.  The thyroid test is normal and the chemistries are also fine.  The abnormal chloride and protein results are not a problem.  Your hemoglobin remains low but is actually improved from before.    Please let me know if your pain is not better with the medications and be sure to follow up with me in 4 weeks.    Addy Frias M.D.

## 2022-07-06 NOTE — PROGRESS NOTES
ANTICOAGULATION MANAGEMENT     Amira Arreola 89 year old female is on warfarin with supratherapeutic INR result. (Goal INR 2.0-3.0)    Recent labs: (last 7 days)     07/06/22  0914   INR 3.1*       ASSESSMENT       Source(s): Chart Review and Home Care/Facility Nurse       Warfarin doses taken: Warfarin taken as instructed    Diet: hasn't had Ensure this week, not likely to have it going forward    New illness, injury, or hospitalization: No    Medication/supplement changes: stopped Decadron, she was taking 20mg once weekly. This could affect subsequent INRs    Signs or symptoms of bleeding or clotting: No    Previous INR: Supratherapeutic    Additional findings: None       PLAN     Recommended plan for ongoing change(s) affecting INR     Dosing Instructions: Smallest dose adjustment possible with Amira's tablet size is 10%. Given mildly out of range result today with potential upcoming effect of discontinued decadron (could allow INR to decrease on it's own) continue your current warfarin dose with next INR in 5 days       Summary  As of 7/6/2022    Full warfarin instructions:  4 mg every Tue, Thu, Sat; 2 mg all other days   Next INR check:  7/11/2022             Telephone call with Gerardo home care/facility nurse who verbalizes understanding and agrees to plan    Orders given to  Homecare nurse/facility to recheck    Education provided: Goal range and significance of current result and Potential interaction between warfarin and stopping Ensure/stopping Decadron    Plan made per [facility] anticoagulation protocol    Ameena Berg RN  Anticoagulation Clinic  7/6/2022    _______________________________________________________________________     Anticoagulation Episode Summary     Current INR goal:  2.0-3.0   TTR:  81.5 % (1 y)   Target end date:  Indefinite   Send INR reminders to:  DANTE SUMNER    Indications    Long term current use of anticoagulant therapy [Z79.01]  Atrial fibrillation (H) [I48.91] (Resolved)  [I48.91]  Paroxysmal atrial fibrillation (H) [I48.0]           Comments:   Rajiv RN [facility] [phone] private pay nursing visits to check INR         Anticoagulation Care Providers     Provider Role Specialty Phone number    Addy Frias MD Referring Internal Medicine [phone]

## 2022-07-11 NOTE — PROGRESS NOTES
ANTICOAGULATION MANAGEMENT     Amira Arreola 89 year old female is on warfarin with therapeutic INR result. (Goal INR 2.0-3.0)    Recent labs: (last 7 days)     07/11/22  0918   INR 2.6*       ASSESSMENT       Source(s): Chart Review and Home Care/Facility Nurse       Warfarin doses taken: Warfarin taken as instructed    Diet: No new diet changes identified    New illness, injury, or hospitalization: No    Medication/supplement changes: None noted    Signs or symptoms of bleeding or clotting: No    Previous INR: Supratherapeutic    Additional findings: None       PLAN     Recommended plan for no diet, medication or health factor changes affecting INR     Dosing Instructions: continue your current warfarin dose with next INR in 1 week       Summary  As of 7/11/2022    Full warfarin instructions:  4 mg every Tue, Thu, Sat; 2 mg all other days   Next INR check:  7/18/2022             Telephone call with [facility] home care/facility nurse who verbalizes understanding and agrees to plan    Patient to recheck with home meter    Education provided: Please call back if any changes to your diet, medications or how you've been taking warfarin    Plan made per [facility] anticoagulation protocol    Suyapa Walton RN  Anticoagulation Clinic  7/11/2022    _______________________________________________________________________     Anticoagulation Episode Summary     Current INR goal:  2.0-3.0   TTR:  81.2 % (1 y)   Target end date:  Indefinite   Send INR reminders to:  DANTE SUMNER    Indications    Long term current use of anticoagulant therapy [Z79.01]  Atrial fibrillation (H) [I48.91] (Resolved) [I48.91]  Paroxysmal atrial fibrillation (H) [I48.0]           Comments:   Rajiv HENSLEY [facility] [phone] private pay nursing visits to check INR         Anticoagulation Care Providers     Provider Role Specialty Phone number    Addy Frias MD Referring Internal Medicine [phone]

## 2022-07-18 NOTE — PROGRESS NOTES
ANTICOAGULATION MANAGEMENT     Amira Arreola 89 year old female is on warfarin with supratherapeutic INR result. (Goal INR 2.0-3.0)    Recent labs: (last 7 days)     07/18/22  1517   INR 3.8*       ASSESSMENT       Source(s): Chart Review, Patient/Caregiver Call and Home Care/Facility Nurse       Warfarin doses taken: Warfarin taken as instructed    Diet: Decreased greens/vitamin K in diet; plans to resume previous intake    New illness, injury, or hospitalization: No    Medication/supplement changes: None noted    Signs or symptoms of bleeding or clotting: No    Previous INR: Therapeutic last visit; previously outside of goal range    Additional findings: None       PLAN     Recommended plan for temporary change(s) affecting INR     Dosing Instructions: hold dose then continue your current warfarin dose with next INR in 2 weeks       Summary  As of 7/18/2022    Full warfarin instructions:  7/18: Hold; Otherwise 4 mg every Tue, Thu, Sat; 2 mg all other days   Next INR check:  8/1/2022             Telephone call with [facility] home care/facility nurse who verbalizes understanding and agrees to plan    Orders given to  Homecare nurse/facility to recheck    Education provided: Importance of consistent vitamin K intake, Impact of vitamin K foods on INR, Vitamin K content of foods, Monitoring for bleeding signs and symptoms and When to seek medical attention/emergency care    Plan made per [facility] anticoagulation protocol    Senia Sanchez RN  Anticoagulation Clinic  7/18/2022    _______________________________________________________________________     Anticoagulation Episode Summary     Current INR goal:  2.0-3.0   TTR:  79.9 % (1 y)   Target end date:  Indefinite   Send INR reminders to:  DANTE SUMNER    Indications    Long term current use of anticoagulant therapy [Z79.01]  Atrial fibrillation (H) [I48.91] (Resolved) [I48.91]  Paroxysmal atrial fibrillation (H) [I48.0]           Comments:   Rajiv HENSLEY [facility]  [phone] private pay nursing visits to check INR         Anticoagulation Care Providers     Provider Role Specialty Phone number    Addy Frias MD Referring Internal Medicine [phone]

## 2022-07-26 NOTE — TELEPHONE ENCOUNTER
ANTICOAGULATION MANAGEMENT:  Medication Refill    Anticoagulation Summary  As of 7/18/2022    Warfarin maintenance plan:  4 mg (4 mg x 1) every Tue, Thu, Sat; 2 mg (4 mg x 0.5) all other days   Next INR check:  8/1/2022   Target end date:  Indefinite    Indications    Long term current use of anticoagulant therapy [Z79.01]  Atrial fibrillation (H) [I48.91] (Resolved) [I48.91]  Paroxysmal atrial fibrillation (H) [I48.0]             Anticoagulation Care Providers     Provider Role Specialty Phone number    Addy Frias MD Referring Internal Medicine [phone]          Visit with referring provider/group within last year: Yes    [facility] referral signed within last year: Yes    Amira meets all criteria for refill (current [facility] referral, office visit with referring provider/group in last year, lab monitoring up to date or not exceeding 2 weeks overdue). Rx instructions and quantity supplied updated to match patient's current dosing plan. Warfarin 90 day supply with 1 refill granted per [facility] protocol     Tameka Perez, RN  Anticoagulation Clinic

## 2022-07-26 NOTE — TELEPHONE ENCOUNTER
This refill request is a duplicate previously sent to pharmacy or currently in review.  Refused medication to pharmacy as duplicate.   Ariane Charles RN

## 2022-07-26 NOTE — TELEPHONE ENCOUNTER
Last INR 7-  Next due 8-1-2022    Please review dosing instructions and update as appropriate    RT Tahira (R)

## 2022-07-26 NOTE — PROGRESS NOTES
Amira is a 89 year old who is being evaluated via a billable video visit.      How would you like to obtain your AVS? MyChart  If the video visit is dropped, the invitation should be resent by: Text to cell phone: [phone]  Will anyone else be joining your video visit? No              Subjective   Amira is a 89 year old, presenting for the following health issues:  No chief complaint on file.    This is a follow-up to the July 1 visit.  At that point in time we scheduled Tylenol 3 times daily and added low-dose Vicodin as needed because of her back pain.  Additionally, she had significant weight loss.  I also lowered her Lasix and potassium to just 1 a day.    In follow-up she is doing well.  Her back pain is improved.  She is taking a half of a Vicodin daily at the most.  She denies chest pain or shortness of breath.  No abdominal pain or nausea.  Her weight continues to go down however.  She otherwise is doing well.    We also discussed end-of-life issues and I asked that the family discuss this further but they have not had a chance to yet.      Vitals:  No vitals were obtained today due to virtual visit.    Physical Exam   GENERAL: Healthy, alert and no distress  EYES: Eyes grossly normal to inspection.  No discharge or erythema, or obvious scleral/conjunctival abnormalities.  RESP: No audible wheeze, cough, or visible cyanosis.  No visible retractions or increased work of breathing.    SKIN: Visible skin clear. No significant rash, abnormal pigmentation or lesions.  NEURO: Cranial nerves grossly intact.  Mentation and speech appropriate for age.  PSYCH: Mentation appears normal, affect normal/bright, judgement and insight intact, normal speech and appearance well-groomed.    Labs noted    ASSESSMENT:  1. Low back pain, improved, could be due to met vs comp fx, vs strain, but at this point nothing more to do that would help  2. Chf, stable  3. Weight loss, suspect multifactoral including age, dementia,  pain, etc    PLAN:  No change in meds  Follow up 6 weeks by nikolai but call if problems prior    Addy Frias M.D.              Video-Visit Details    Video Start Time: 12:28 PM    Type of service:  Video Visit    Video End Time:12:43 PM    Originating Location (pt. Location): Home    Distant Location (provider location):  [facility]     Platform used for Video Visit: Immunovaccine    .  ..

## 2022-08-01 NOTE — PROGRESS NOTES
ANTICOAGULATION MANAGEMENT     Amira Arreola 89 year old female is on warfarin with supratherapeutic INR result. (Goal INR 2.0-3.0)    No results for input(s): INR in the last 168 hours.    ASSESSMENT       Source(s): Chart Review and Home Care/Facility Nurse       Warfarin doses taken: Warfarin taken as instructed    Diet: No new diet changes identified    New illness, injury, or hospitalization: Yes: patient was seen in office visit for low back pain, this is an ongoing issue    Medication/supplement changes: Started on hydrocodone/acetaminophen, taking one half of a 5/325 mg tablet every other day at bedtime    Signs or symptoms of bleeding or clotting: No    Previous INR: Supratherapeutic    Additional findings: None       PLAN     Recommended plan for temporary change(s) and ongoing change(s) affecting INR     Dosing Instructions: hold dose then decrease your warfarin dose (10% change) with next INR in 1 week   (patient has been supra the last few checks, adjusting due to this)    Summary  As of 8/1/2022    Full warfarin instructions:  8/1: Hold; Otherwise 4 mg every Tue, Sat; 2 mg all other days   Next INR check:               Telephone call with Gerardo home care/facility nurse who verbalizes understanding and agrees to plan    Orders given to  Homecare nurse/facility to recheck    Education provided: Please call back if any changes to your diet, medications or how you've been taking warfarin    Plan made per [facility] anticoagulation protocol    Suyapa Walton, RN  Anticoagulation Clinic  8/1/2022    _______________________________________________________________________     Anticoagulation Episode Summary     Current INR goal:  2.0-3.0   TTR:  76.1 % (1 y)   Target end date:  Indefinite   Send INR reminders to:  DANTE SUMNER    Indications    Long term current use of anticoagulant therapy [Z79.01]  Atrial fibrillation (H) [I48.91] (Resolved) [I48.91]  Paroxysmal atrial fibrillation (H) [I48.0]            Comments:   Rajiv RN [facility] [phone] private pay nursing visits to check INR         Anticoagulation Care Providers     Provider Role Specialty Phone number    Addy Frias MD Referring Internal Medicine [phone]

## 2022-08-01 NOTE — TELEPHONE ENCOUNTER
Verbal approval given for the homecare request below. Homecare/Hospice agency to fax orders for provider signature.    Skilled nursing as directed by [facility] clinic    Alycia Mishra RN  [facility]

## 2022-08-08 NOTE — PROGRESS NOTES
ANTICOAGULATION MANAGEMENT     Amira Arreola 89 year old female is on warfarin with therapeutic INR result. (Goal INR 2.0-3.0)    Recent labs: (last 7 days)     08/08/22  1334   INR 2.7*       ASSESSMENT       Source(s): Chart Review and Home Care/Facility Nurse       Warfarin doses taken: Warfarin taken as instructed    Diet: No new diet changes identified    New illness, injury, or hospitalization: No    Medication/supplement changes: None noted    Signs or symptoms of bleeding or clotting: No    Previous INR: Supratherapeutic    Additional findings: None       PLAN     Recommended plan for no diet, medication or health factor changes affecting INR     Dosing Instructions: Continue your current warfarin dose with next INR in 10 days      Summary  As of 8/8/2022    Full warfarin instructions:  4 mg every Tue, Sat; 2 mg all other days   Next INR check:  8/18/2022             Telephone call with Geovanna home care/facility nurse who verbalizes understanding and agrees to plan    Orders given to  Homecare nurse/facility to recheck    Education provided: Goal range and significance of current result    Plan made per [facility] anticoagulation protocol    Ernst Carlson RN  Anticoagulation Clinic  8/8/2022    _______________________________________________________________________     Anticoagulation Episode Summary     Current INR goal:  2.0-3.0   TTR:  74.9 % (1 y)   Target end date:  Indefinite   Send INR reminders to:  DANTE SUMNER    Indications    Long term current use of anticoagulant therapy [Z79.01]  Atrial fibrillation (H) [I48.91] (Resolved) [I48.91]  Paroxysmal atrial fibrillation (H) [I48.0]           Comments:   Rajiv HENSLEY [facility] [phone] private pay nursing visits to check INR         Anticoagulation Care Providers     Provider Role Specialty Phone number    Addy Frias MD Referring Internal Medicine [phone]

## 2022-08-15 NOTE — TELEPHONE ENCOUNTER
Please see my last office visit at which time I recommended family discuss end-of-life issues.  So I am not entirely sure where that has gone.  Does this mean that she wants to enter hospice?    Addy Frias M.D.

## 2022-08-15 NOTE — TELEPHONE ENCOUNTER
Gerardo HENSLEY from [facility] requesting order for hospice evaluation be faxed to [facility] at [phone].  Eufemia Boateng RN

## 2022-08-17 NOTE — TELEPHONE ENCOUNTER
Gerardo called back  Family is ready for hospice right now.    Pt has also lost 10-15 lbs in last couple of weeks  Not eating as well.    They do need hospice order to send out hospice. Started for your completion    Jessica Charlton RN  [facility] RN Triage Team

## 2022-08-17 NOTE — TELEPHONE ENCOUNTER
Left message for Gearrdo to call back, but may try Gerardo in am to get resolved.  Jessica Charlton, RN  [facility] RN Triage Team

## 2022-08-18 NOTE — TELEPHONE ENCOUNTER
Referral faxed to Gerardo at [facility] to [phone].    Steff GUDINO MA on 8/18/2022 at 4:39 PM

## 2022-08-18 NOTE — TELEPHONE ENCOUNTER
Dannie, son no C2C,  calling back states they have medical POA paper work. Writer advised Dannie to have someone bring in copy of paperwork to be scanned into chart.     Routing to team to fax referral to Gerardo at [facility] at [phone].      Teena Enrique RN  [facility]

## 2022-08-18 NOTE — TELEPHONE ENCOUNTER
Ok, but need to know who her poa is, and if that patient is in agreement     Thanks    Addy Frias M.D.

## 2022-08-18 NOTE — TELEPHONE ENCOUNTER
"No POA    Eufemia Arreola is munira care decision maker, but not on a consent to communicate (so would not be triggered without legal or pcp decision).   Venice and Olesya are on a consent to communicate.  Tried calling Yesi but unable to leave message.  Called pt, talked to pt and son Dannie.  Pt states that yes she would like to be on hospice, because it would be a good way to go \"in the home.\"    Dannie states that him and all the other sisters/family are on the same page with this now and all agreeable to hospice.    Dannie said they have some financial POA things, but no actual medical POA, just the HCD if pt unable to make own decisions.      Jessica Charlton, RN  [facility] RN Triage Team      "

## 2022-08-18 NOTE — PROGRESS NOTES
ANTICOAGULATION MANAGEMENT     Amira Arreola 89 year old female is on warfarin with supratherapeutic INR result. (Goal INR 2.0-3.0)    Recent labs: (last 7 days)     08/18/22  1521   INR 3.3*       ASSESSMENT       Source(s): Chart Review and Home Care/Facility Nurse       Warfarin doses taken: Warfarin taken as instructed    Diet: No new diet changes identified    New illness, injury, or hospitalization: Diarrhea yesterday, improving now.    Medication/supplement changes: None noted    Signs or symptoms of bleeding or clotting: No    Previous INR: Therapeutic last visit; previously outside of goal range    Additional findings: Pt may be starting hospice in the near future.       PLAN     Recommended plan for temporary change(s) affecting INR     Dosing Instructions: hold dose then continue your current warfarin dose with next INR in 1 week       Summary  As of 8/18/2022    Full warfarin instructions:  8/18: Hold; Otherwise 4 mg every Tue, Sat; 2 mg all other days   Next INR check:  8/25/2022             Telephone call with Geovanna home care/facility nurse who verbalizes understanding and agrees to plan    Orders given to  Homecare nurse/facility to recheck    Education provided: Goal range and significance of current result    Plan made per [facility] anticoagulation protocol    Ernst Carlson RN  Anticoagulation Clinic  8/18/2022    _______________________________________________________________________     Anticoagulation Episode Summary     Current INR goal:  2.0-3.0   TTR:  73.5 % (1 y)   Target end date:  Indefinite   Send INR reminders to:  [facility]    Indications    Long term current use of anticoagulant therapy [Z79.01]  Atrial fibrillation (H) [I48.91] (Resolved) [I48.91]  Paroxysmal atrial fibrillation (H) [I48.0]           Comments:   Rajiv HENSLEY [facility] [phone] private pay nursing visits to check INR         Anticoagulation Care Providers     Provider Role Specialty Phone number    Addy Frias,  MD [facility] [place] Internal Medicine [phone]

## 2022-08-21 PROBLEM — R53.83 LETHARGY: Status: ACTIVE | Noted: 2022-01-01

## 2022-08-21 PROBLEM — U07.1 INFECTION DUE TO 2019 NOVEL CORONAVIRUS: Status: ACTIVE | Noted: 2022-01-01

## 2022-08-21 PROBLEM — R82.90 ABNORMAL URINALYSIS: Status: ACTIVE | Noted: 2022-01-01

## 2022-08-21 PROBLEM — E86.0 DEHYDRATION: Status: ACTIVE | Noted: 2022-01-01

## 2022-08-21 NOTE — ED TRIAGE NOTES
BIBA from home. Reportedly has had NVD for 2 days. Family told EMS she has been very lethargic and slow to respond today. Had a max temp at home of 101.4. EMS 12 lead EKG showed a-flutter with a new left BBB     Triage Assessment     Row Name 08/21/22 1749       Triage Assessment (Adult)    Airway WDL WDL       Respiratory WDL    Respiratory WDL WDL       Skin Circulation/Temperature WDL    Skin Circulation/Temperature WDL WDL       Cardiac WDL    Cardiac WDL --  EMS reports a-flutter with new L BBB       Peripheral/Neurovascular WDL    Peripheral Neurovascular WDL WDL       Cognitive/Neuro/Behavioral WDL    Cognitive/Neuro/Behavioral WDL WDL

## 2022-08-21 NOTE — ED NOTES
Bed: ED19  Expected date:   Expected time:   Means of arrival:   Comments:  Lety 854 89 F weakness and lethargy ETA 1744

## 2022-08-21 NOTE — ED PROVIDER NOTES
History     Chief Complaint:  Fever and Generalized Weakness    The history is provided by the patient.      Amira Arreola is a 89 year old female with a history of a-fib anticoagulated on warfarin, hypertension, and STV who presents to the emergency department for evaluation of fever and generalized weakness.  The patient reports that she has had nausea, vomiting, and diarrhea for the last two days. Today, Lili has become increasingly tired and slow to response per family. Amira has had a fever of 101.4.  With concern of Amira decline, the patients family called EMS and she was transported to the emergency department for further evaluation.     Review of Systems   Constitutional: Positive for activity change and fatigue.   Gastrointestinal: Positive for diarrhea, nausea and vomiting.   All other systems reviewed and are negative.    Allergies:  Penicillin [Penicillins]    Medications:    acetaminophen (TYLENOL) 500 MG tablet  Carboxymethylcellulose Sod PF (REFRESH PLUS) 0.5 % SOLN ophthalmic solution  furosemide (LASIX) 20 MG tablet  latanoprost (XALATAN) 0.005 % ophthalmic solution  lidocaine-prilocaine (EMLA) cream  Multiple Vitamins-Minerals (DAILY MULTIVITAMIN) CAPS  nystatin (MYCOSTATIN) 995536 UNIT/GM external cream  potassium chloride ER (KLOR-CON M) 20 MEQ CR tablet  SIMBRINZA 1-0.2 % ophthalmic suspension  sodium fluoride 1.1 % CREA  triamcinolone (KENALOG) 0.1 % external cream  warfarin ANTICOAGULANT (COUMADIN) 4 MG tablet    Past Medical History:    Acute diastolic heart failure   Ascending aorta dilatation   Cancer, metastatic to bone    Colonic polyp   Compression fracture   Elevated MCV   HTN (hypertension)  Hyperlipidemia    Lung nodule   Menorrhagia   MGUS (monoclonal gammopathy of unknown significance)    Multiple myeloma    OAB (overactive bladder)   Osteoporosis    Paroxysmal atrial fibrillation    Sciatica of left side   Shingles   SVT (supraventricular tachycardia)  "   Thrombocytopenia       Past Surgical History:    Bone marrow biopsy, bone specimen, needle/trocar   Cataract IOL, rt/lt    section  Colonoscopy  EP ablation AV node  EP pacemaker   excise exostosis tibia/fibula   Hysteroscopy and D&C  Left ankle replacement  Right ankle surgery    Family History:    Heart disease (father)   CAD (mother)   Cerebrovascular disease (bother)     Social History:  Primary care provider: Addy Frias    Physical Exam     Patient Vitals for the past 24 hrs:   BP Temp Temp src Pulse Resp SpO2 Height Weight   22 (!) 156/82 -- -- 78 -- 95 % -- --   22 1900 (!) 155/68 -- -- 73 -- 94 % -- --   22 1800 (!) 153/67 -- -- 70 -- 93 % -- --   22 1752 (!) 146/72 99.4  F (37.4  C) Axillary 71 14 93 % 1.6 m (5' 3\") 57 kg (125 lb 10.6 oz)     Physical Exam  Constitutional: Well developed, ill, nontox appearance  Head: Atraumatic.   Mouth/Throat: Oropharynx is clear and dry  Neck:  no stridor  Eyes: no scleral icterus  Cardiovascular: RRR, 2+ bilat radial pulses  Pulmonary/Chest: nml resp effort, Clear BS bilat  Abdominal: ND, soft, NT, no rebound or guarding bilat  Ext: Warm, well perfused, no edema  Neurological: A&O, symmetric facies, moves ext x4  Skin: Skin is warm and dry.   Psychiatric: Behavior is normal. Thought content normal.   Nursing note and vitals reviewed.    Emergency Department Course   ECG:  ECG results from 22   EKG 12-lead, tracing only     Value    Systolic Blood Pressure     Diastolic Blood Pressure     Ventricular Rate 70    Atrial Rate 468    AK Interval     QRS Duration 136        QTc 494    P Axis     R AXIS -43    T Axis 42    Interpretation ECG      Ventricular-paced rhythm  Abnormal ECG  When compared with ECG of 2019 10:06,  Electronic ventricular pacemaker has replaced Atrial fibrillation  Confirmed by GENERATED REPORT, COMPUTER ([id number]),  OSCAR DHILLON ([id number]) on 2022 6:23:44 PM       *Note: Due to " a large number of results and/or encounters for the requested time period, some results have not been displayed. A complete set of results can be found in Results Review.     Imaging:  XR Chest Port 1 View   Final Result   IMPRESSION: Change in positioning patient rotated somewhat into an Somali position as well as being more kyphotic in position. Mild cardiomegaly unchanged with single lead pacemaker. Small left pleural effusion stable. Mild increase in interstitial    prominence diffusely suggesting minimal interstitial edema but no new focal pneumonia evident. Right-sided Port-A-Cath in stable position.        Report per radiology    Laboratory:  Labs Ordered and Resulted from Time of ED Arrival to Time of ED Departure   INFLUENZA A/B & SARS-COV2 PCR MULTIPLEX - Abnormal       Result Value    Influenza A PCR Negative      Influenza B PCR Negative      RSV PCR Negative      SARS CoV2 PCR Positive (*)    ISTAT GASES LACTATE VENOUS POCT - Abnormal    Lactic Acid POCT 0.7      Bicarbonate Venous POCT 20 (*)     O2 Sat, Venous POCT 84 (*)     pCO2V Venous POCT 30 (*)     pH Venous POCT 7.42      pO2 Venous POCT 47     ROUTINE UA WITH MICROSCOPIC REFLEX TO CULTURE - Abnormal    Color Urine Yellow      Appearance Urine Clear      Glucose Urine Negative      Bilirubin Urine Negative      Ketones Urine 10  (*)     Specific Gravity Urine 1.023      Blood Urine Negative      pH Urine 5.5      Protein Albumin Urine 50  (*)     Urobilinogen Urine Normal      Nitrite Urine Negative      Leukocyte Esterase Urine Small (*)     Bacteria Urine Few (*)     Mucus Urine Present (*)     RBC Urine 3 (*)     WBC Urine 12 (*)     Squamous Epithelials Urine <1      Hyaline Casts Urine 1     COMPREHENSIVE METABOLIC PANEL - Abnormal    Sodium 137      Potassium 4.0      Chloride 108      Carbon Dioxide (CO2) 22      Anion Gap 7      Urea Nitrogen 9      Creatinine 0.68      Calcium 9.8      Glucose 120 (*)     Alkaline Phosphatase 45      AST  29      ALT 15      Protein Total 6.1 (*)     Albumin 3.4      Bilirubin Total 0.8      GFR Estimate 82     CBC WITH PLATELETS AND DIFFERENTIAL - Abnormal    WBC Count 4.2      RBC Count 3.55 (*)     Hemoglobin 10.3 (*)     Hematocrit 32.6 (*)     MCV 92      MCH 29.0      MCHC 31.6      RDW 20.0 (*)     Platelet Count 167      % Neutrophils 74      % Lymphocytes 7      % Monocytes 18      % Eosinophils 0      % Basophils 0      % Immature Granulocytes 1      NRBCs per 100 WBC 0      Absolute Neutrophils 3.1      Absolute Lymphocytes 0.3 (*)     Absolute Monocytes 0.8      Absolute Eosinophils 0.0      Absolute Basophils 0.0      Absolute Immature Granulocytes 0.0      Absolute NRBCs 0.0     CRP INFLAMMATION - Abnormal    CRP Inflammation 29.2 (*)    ERYTHROCYTE SEDIMENTATION RATE AUTO - Normal    Erythrocyte Sedimentation Rate 21     URINE CULTURE     Emergency Department Course:    Reviewed:  I reviewed nursing notes, vitals, past medical history and Care Everywhere    Assessments:  1817 I obtained history and examined the patient as noted above.   1949 I rechecked the patient and explained findings.    Consults:   1949  I spoke with Dr Ramirez with the Hospitalist Service on the phone.     Interventions:  1945  0.9% sodium chloride BOLUS 1000 mL, IV    Disposition:  The patient was admitted to the hospital under the care of Dr. Ramirez.     Impression & Plan      Medical Decision Making:  Covid-19  Amira Arreola was evaluated during a global COVID-19 pandemic, which necessitated consideration that the patient might be at risk for infection with the SARS-CoV-2 virus that causes COVID-19.   Applicable protocols for evaluation were followed during the patient's care.     89 year old female presenting w/ diarrhea, fatigue, weakness, decreased PO intake     DDx includes viral syndrome NOS, influenza-like illness, influenza, COVID-19, enteritis, electrolyte abnormality, dehydration, bacterial pneumonia.  Doubt meningitis,  encephalitis, CVA given history and physical exam.  Labs significant for UA w/ WBCs, COVID-19 positive.  Imaging sig for findings described above.  Patient's presentation seems most consistent with acute COVID infection.  Given her generalized weakness and decreased p.o. intake, I think would be reasonable to admit the patient to the hospitalist service for further management.  The family has discussed pursuing hospice but the process has not been started yet.  Patient's daughter counseled on all results, disposition and diagnosis.  They are understanding and agreeable to plan. Patient admitted in stable condition.      Diagnosis:    ICD-10-CM    1. Infection due to 2019 novel coronavirus  U07.1    2. Dehydration  E86.0    3. Lethargy  R53.83    4. Abnormal urinalysis  R82.90      Scribe Disclosure:  RANULFO, Louloucipriano Morejon, am serving as a scribe at 6:17 PM on 8/21/2022 to document services personally performed by Carlito Mckinney MD based on my observations and the provider's statements to me.      Carlito Mckinney MD  08/21/22 8995

## 2022-08-22 NOTE — PROGRESS NOTES
RECEIVING UNIT ED HANDOFF REVIEW    ED Nurse Handoff Report was reviewed by: Jenn Perkins RN on August 22, 2022 at 4:45 PM

## 2022-08-22 NOTE — ED NOTES
I have verified the content of the note, which accurately reflects my assessment of the patient and the plan of care.   Donna Zhao, [facility], PharmD   Writer attempted to call pt daughter, Olesya for update. No answer.

## 2022-08-22 NOTE — PHARMACY-ADMISSION MEDICATION HISTORY
Pharmacy Medication History  Admission medication history interview status for the 8/21/2022  admission is complete. See EPIC admission navigator for prior to admission medications     Location of Interview: Phone  Medication history sources: Patient's family/friend (daughter), Surescripts and Care Everywhere    Significant changes made to the medication list:  Trusopt and Alphagan were added to the list.   Furosemide and KCl were updated to 1 tablet daily.  Norco was added.    Emla, Sodium fluoride, Kenalog, Simbrinza, and nystatin were removed.    In the past week, patient estimated taking medication this percent of the time: greater than 90%    Additional medication history information:   Amira did not have any of her oral medications today due to her illness.     She takes warfarin for afib with a goal INR of 2-3.  Her INR was 3.3 on 8/18, so she was instructed to hold the dose that day and then take 4 mg on Tues and Sat, 2 mg x the other 5 days per week.  Her last warfarin dose was 4 mg on 8/20/22.    Medication reconciliation completed by provider prior to medication history? Yes    Time spent in this activity: 20 minutes    Prior to Admission medications    Medication Sig Last Dose Taking? Auth Provider Long Term End Date   brimonidine (ALPHAGAN) 0.2 % ophthalmic solution Place 1 drop into the right eye 2 times daily 8/21/2022 at am Yes Unknown, Entered By History     dorzolamide (TRUSOPT) 2 % ophthalmic solution Place 1 drop into the right eye 2 times daily 8/21/2022 at am Yes Unknown, Entered By History     furosemide (LASIX) 20 MG tablet Take 3 (60mg) tablets daily, if weight > 137 pounds take an additional 20mg (1 tablet)  Patient taking differently: Take 20 mg by mouth daily Take 3 (60mg) tablets daily, if weight > 137 pounds take an additional 20mg (1 tablet) 8/21/2022 at am Yes Elisabeth Hicks APRN CNP Yes    HYDROcodone-acetaminophen (NORCO) 5-325 MG tablet Take 0.5-1 tablets by mouth every 6 hours  as needed for severe pain 8/20/2022 at hs Yes Unknown, Entered By History     latanoprost (XALATAN) 0.005 % ophthalmic solution Place 1 drop into the right eye At Bedtime 8/20/2022 at hs Yes Unknown, Entered By History Yes    Multiple Vitamins-Minerals (DAILY MULTIVITAMIN) CAPS Take 1 tablet by mouth daily  8/20/2022 at am Yes Reported, Patient     potassium chloride ER (KLOR-CON M) 20 MEQ CR tablet Take 1 tablet 2 x a day, with an additional 1 tablet on days when you take extra fursoemide  Patient taking differently: Take 20 mEq by mouth daily Take 1 tablet 2 x a day, with an additional 1 tablet on days when you take extra fursoemide 8/21/2022 at am Yes Elisabeth Hicks APRN CNP     warfarin ANTICOAGULANT (COUMADIN) 4 MG tablet TAKE 1 tablet ( 4 mg) BY MOUTH every Tue, Thu, Sat; and take 1/2 tablet (2 mg) all other days of the week or as directed by your ACC Team.  Patient taking differently: Take 4 mg on Tues and Sat; 2 mg x 5 days per week 8/20/2022 at pm Yes Addy Frias MD     acetaminophen (TYLENOL) 500 MG tablet Take 500-1,000 mg by mouth every 6 hours as needed for mild pain  at prn  Unknown, Entered By History     Carboxymethylcellulose Sod PF (REFRESH PLUS) 0.5 % SOLN ophthalmic solution 1 drop 4 times daily as needed for dry eyes  at prn  Unknown, Entered By History     order for DME Equipment being ordered: Nebulizer with tubes/mask/acessories, use as directed   Jarad Ríos MD         The information provided in this note is only as accurate as the sources available at the time of update(s)

## 2022-08-22 NOTE — PROGRESS NOTES
[facility]    Hospitalist Progress Note    Brief Summary:    This is a 89-year-old female with history of multiple myeloma in remission, CHF, diastolic ascending aortic dilation, compression fractures, hypertension, moderate to severe pulmonary hypertension, paroxysmal atrial fibrillation, on chronic anticoagulation with Coumadin, hyperlipidemia, status post permanent pacemaker placement and dementia, was brought to the ED was feeling generalized weak, fatigued, tired, not eating well and found to have fever as well.    Assessment & Plan     1.  Generalized weakness secondary to COVID-19 infection:  This is a 89-year-old female with multiple comorbidities including multiple myeloma in the past, who presented with fever, chills, weakness, fatigue, nausea, vomiting, diarrhea and quite dehydrated at this time. Admit as inpatient,   Order chest x-ray, ESR, CRP reviewed, no significant infiltrate and CRP only mildly elevated, but given her comorbidities, she is at high risk for progression, so started her on dexamethasone and IV remdesivir for 3 days.   I will keep her on gentle IV hydration as she having diarrhea too and eating much and looked quite dehydrated. Patient remain in ED and unfortunately no orders were released overnight. Releaser all the orders now. Very poor appetite, will keep her on maintenance fluid at this time, likely to avoid IV fluids though.     2.  Possible urinary tract infection:  The patient, although has no symptoms, but she is demented and then the UA positive for WBC of 12, mild leukocyte esterase positive.  She was started on IV ceftriaxone in the ED.  We will continue with that.    Urine culture pending.   3.  History of congestive heart failure:  She is on Lasix.   4.  Hypertension:  Reasonably controlled at this time.  5.  History of paroxysmal atrial fibrillation, status post pacemaker placement:  Currently ventricular rhythm chronic anticoagulation with  Coumadin.  We will ask the pharmacy to dose her Coumadin.  6.  History of multiple myeloma:  In remission.  History of compression fracture because of that.        DVT Prophylaxis: Warfarin  Code Status: No CPR- Do NOT Intubate    Disposition: Expected discharge in 1-2 days if improve.     bAdiaziz Ramirez MD, MD  Text Page  (7am - 6pm)    Interval History   Patient seen and evaluated in her room, very lethargic still, coughing, not able to provide much history, her appetite remain  Very poor.   Patient remain in ED as waiting for bed, no orders released overnight, released all orders this morning.     -Data reviewed today: I reviewed all new labs and imaging results over the last 24 hours. I personally reviewed no images or EKG's today.    Physical Exam   Temp: 99.4  F (37.4  C) Temp src: Axillary BP: 135/89 Pulse: 71   Resp: 14 SpO2: 92 % O2 Device: None (Room air)    Vitals:    08/21/22 1752   Weight: 57 kg (125 lb 10.6 oz)     Vital Signs with Ranges  Temp:  [99.4  F (37.4  C)] 99.4  F (37.4  C)  Pulse:  [70-82] 71  Resp:  [14] 14  BP: (130-168)/() 135/89  SpO2:  [92 %-97 %] 92 %  No intake/output data recorded.    Constitutional: fatigued, lethargic.   Eyes: Lids and lashes normal, pupils equal, round and reactive to light, extra ocular muscles intact, sclera clear, conjunctiva normal  Respiratory: No increased work of breathing, good air exchange, clear to auscultation bilaterally, no crackles or wheezing  Cardiovascular: Normal apical impulse, regular rate and rhythm, normal S1 and S2, no S3 or S4, and no murmur noted  GI: No scars, normal bowel sounds, soft, non-distended, non-tender, no masses palpated, no hepatosplenomegally  Skin: no bruising or bleeding  Musculoskeletal: no edema.  Neurologic: no focal deficit, but pleasantly confused.     Medications     - MEDICATION INSTRUCTIONS -       sodium chloride 75 mL/hr at 08/22/22 0824     Warfarin Therapy Reminder         [START ON 8/23/2022] remdesivir   100 mg Intravenous Q24H    And     [START ON 8/23/2022] sodium chloride 0.9%  50 mL Intravenous Q24H     cefTRIAXone  1 g Intravenous Q24H     dexamethasone  6 mg Intravenous Daily     furosemide  20 mg Oral Daily     latanoprost  1 drop Right Eye At Bedtime     [START ON 8/23/2022] multivitamin, therapeutic  1 tablet Oral Daily     potassium chloride ER  20 mEq Oral Daily     sodium chloride (PF)  3 mL Intracatheter Q8H       Data   Recent Labs   Lab 08/22/22  0814 08/21/22  1800 08/18/22  1521   WBC 5.6 4.2  --    HGB 11.4* 10.3*  --    MCV 92 92  --    * 167  --    INR 1.68*  --  3.3*    137  --    POTASSIUM 3.9 4.0  --    CHLORIDE 111* 108  --    CO2 20 22  --    BUN 9 9  --    CR 0.58 0.68  --    ANIONGAP 8 7  --    BRADLEY 9.8 9.8  --    * 120*  --    ALBUMIN 3.3* 3.4  --    PROTTOTAL 6.5* 6.1*  --    BILITOTAL 0.6 0.8  --    ALKPHOS 47 45  --    ALT 17 15  --    AST 25 29  --        Recent Results (from the past 24 hour(s))   XR Chest Port 1 View    Narrative    EXAM: XR CHEST PORT 1 VIEW  LOCATION: [facility]  DATE/TIME: 8/21/2022 8:08 PM    INDICATION: generalized weakness  COMPARISON: 7/6/2019      Impression    IMPRESSION: Change in positioning patient rotated somewhat into an Setswana position as well as being more kyphotic in position. Mild cardiomegaly unchanged with single lead pacemaker. Small left pleural effusion stable. Mild increase in interstitial   prominence diffusely suggesting minimal interstitial edema but no new focal pneumonia evident. Right-sided Port-A-Cath in stable position.

## 2022-08-22 NOTE — PHARMACY-ANTICOAGULATION SERVICE
Clinical Pharmacy - Warfarin Dosing Consult     Pharmacy has been consulted to manage this patient s warfarin therapy.  Indication: Atrial Fibrillation  Therapy Goal: INR 2-3  Warfarin Prior to Admission: Yes  Warfarin PTA Regimen: Warfarin 4 mg Tues, Sat and 2 mg x 5 days per week  Significant drug interactions: ceftriaxone, dexamethasone  Recent documented change in oral intake/nutrition: Unknown  Dose Comments: 4 mg x1 since she missed her dose last night and the INR has dropped to 1.68 (subtherapeutic)    INR   Date Value Ref Range Status   08/22/2022 1.68 (H) 0.85 - 1.15 Final     INR (External)   Date Value Ref Range Status   08/18/2022 3.3 (A) 0.9 - 1.1 Final       Recommend warfarin 4 mg today.  A higher dose was ordered today due to subtherapeutic INR after missing her dose last night and holding her dose on 8/18 per instruction from the anticoagulation clinic.   Pharmacy will monitor Amira Arreola daily and order warfarin doses to achieve specified goal.      Please contact pharmacy as soon as possible if the warfarin needs to be held for a procedure or if the warfarin goals change.

## 2022-08-22 NOTE — ED NOTES
[facility]  ED Nurse Handoff Report    ED Chief complaint: Fever and Generalized Weakness      ED Diagnosis:   Final diagnoses:   Infection due to 2019 novel coronavirus   Dehydration   Lethargy   Abnormal urinalysis       Code Status: DNR / DNI    Allergies:   Allergies   Allergen Reactions     Blood Transfusion Related (Informational Only)      Blood antigens related to receiving Darzelex-AG     Penicillin [Penicillins] Rash     Blotches on chest        Patient Story: pt presents for  Fever, generalized weakness, n/v/d x2 days.   Focused Assessment:  Pt mucus membranes dry, pt Elim IRA but A&O x4.     Treatments and/or interventions provided: IV fluids  Patient's response to treatments and/or interventions: tolerated    To be done/followed up on inpatient unit:  monitor, IV ABX  Does this patient have any cognitive concerns?: none    Activity level - Baseline/Home:  Total Care  Activity Level - Current:   Total Care    Patient's Preferred language: English   Needed?: No    Isolation: None and Other: COVID  Infection: Not Applicable  COVID r/o and special precautions  Patient tested for COVID 19 prior to admission: YES  Bariatric?: No    Vital Signs:   Vitals:    08/21/22 2100 08/21/22 2200 08/21/22 2230 08/21/22 2300   BP: (!) 161/87 (!) 156/110 (!) 155/83 (!) 161/84   Pulse: 71 73 75 74   Resp:       Temp:       TempSrc:       SpO2: 95% 93% 94% 94%   Weight:       Height:           Cardiac Rhythm:     Was the PSS-3 completed:   Yes  What interventions are required if any?               Family Comments: daughter was at bedside, went home for the evening.   OBS brochure/video discussed/provided to patient/family: N/A              Name of person given brochure if not patient: n/a              Relationship to patient: n/a    For the majority of the shift this patient's behavior was Green.   Behavioral interventions performed were none .    ED NURSE PHONE NUMBER: [phone]

## 2022-08-22 NOTE — H&P
[facility]    History and Physical  Hospitalist       Date of Admission:  8/21/2022    Assessment & Plan       This is a 89-year-old female with history of multiple myeloma in remission, CHF, diastolic ascending aortic dilation, compression fractures, hypertension, moderate to severe pulmonary hypertension, paroxysmal atrial fibrillation, on chronic anticoagulation with Coumadin, hyperlipidemia, status post permanent pacemaker placement and dementia, was brought to the ED was feeling generalized weak, fatigued, tired, not eating well and found to have fever as well.    ASSESSMENT AND PLAN:    1.  Generalized weakness secondary to COVID-19 infection:  This is a 89-year-old female with multiple comorbidities including multiple myeloma in the past, who presented with fever, chills, weakness, fatigue, nausea, vomiting, diarrhea and quite dehydrated at this time.  I will admit her.  Order chest x-ray, ESR, CRP.  Given her comorbidities, she is at high risk for progression, so we will start her on dexamethasone and IV remdesivir for 3 days.  We will see how she does.  I will keep her on gentle IV hydration as she having diarrhea too and eating much and looked quite dehydrated for overnight.  We will discontinue that in the morning.  2.  Possible urinary tract infection:  The patient, although has no symptoms, but she is demented and then the UA positive for WBC of 12, mild leukocyte esterase positive.  She was started on IV ceftriaxone in the ED.  We will continue with that.  Send urine culture.  3.  History of congestive heart failure:  She is on Lasix.  We can resume it from tomorrow if her oral intake improves.  4.  Hypertension:  Reasonably controlled at this time.  5.  History of paroxysmal atrial fibrillation, status post pacemaker placement:  Currently ventricular rhythm chronic anticoagulation with Coumadin.  We will ask the pharmacy to dose her Coumadin.  6.  History of multiple myeloma:   In remission.  History of compression fracture because of that.  7.  DVT prophylaxis:  With Coumadin.    CODE STATUS:  I had a detailed discussion with the patient's daughter who is not a POA, but she is talking to her sister.  They will let us know about the code status for now, we will keep her FULL CODE and once  they want to change it, then we can change it to DNR/DNI if they tell us to.    The case discussed with ER physician and the nursing staff taking care of the patient.    Abdiaziz Ramirez MD    DVT Prophylaxis: Warfarin  Code Status: Full Code    Disposition: Expected discharge in 2 days once stable    Abdiaziz Ramirez MD, MD    Primary Care Physician   Addy Frias    Chief Complaint   Fever, weakness, decrease oral intake     History is obtained from the patient    History of Present Illness      This is a 89-year-old female with history of multiple myeloma in remission, CHF, diastolic ascending aortic dilation, compression fractures, hypertension, moderate to severe pulmonary hypertension, paroxysmal atrial fibrillation, on chronic anticoagulation with Coumadin, hyperlipidemia, status post permanent pacemaker placement and dementia, was brought to the ED was feeling generalized weak, fatigued, tired, not eating well and found to have fever as well.    The patient is a very poor historian.  Most of the history is obtained from medical records, talking to the ED physician and the patient's daughter at the bedside.  They noted that they have noticed that the patient's memory is not very good.  She used to walk with a walker.  She still can, but mostly in a wheelchair now.  She noticed that she started having fever and chills, feeling weak, tired and fatigued and not eating anything.  She started having some nausea and diarrhea as well for the last 2 days, so they brought her to the ER for evaluation.  In the ED, she was found to have positive COVID, even though she has been vaccinated and boosted and the urine  looks infected, so the Hospitalist Service was consulted to admit the patient.    Past Medical History    I have reviewed this patient's medical history and updated it with pertinent information if needed.   Past Medical History:   Diagnosis Date     Abnormal CXR 2018    then ct done and not significant     Acute diastolic heart failure (H) 03/22/2019    nl ef, 2+mr and tr with severe pulm htn     Ascending aorta dilatation (H) 04/2016    on echo, mild, fu 7/18 4.0, slightly larger     Cancer, metastatic to bone (H)     due to myeloma     Colonic polyp 2008    adenomatous, fu 2013 tics only     Compression fracture 2016    multiple areas of spine     Dry eyes      Elevated MCV 2015    b12 and folic acid nl     HTN (hypertension) 2000    off meds for years     Lung nodule 08/2018    on ct, 4mm, ct done for fu abnl cxr     Menorrhagia 2002    hysteroscopy and d and c done     MGUS (monoclonal gammopathy of unknown significance) 2015    eval by Dr. Roberts     Moderate to severe pulmonary hypertension (H) 03/2019    on echo     Multiple myeloma (H) 2016    dx 5/16 at [facility], bone lesions seen on mri 6/16     OAB (overactive bladder) 2013    Dr. Grullon     Osteoporosis     fu done 2010 and stable, went off meds then, fu done 2013; has had gyn fu and added evista 2013 by gyn     Palpitations 4/16    nl echo, mildly dilated asc aorta     Paroxysmal atrial fibrillation (H) 04/2016    had palp and ziopatch showed it, echo nl lv fxn, mild mr and tr, added coum and toprol, toprol dose raised 12/22/16; hosp 2019 for this 3x, then had av solomon ablation and ppm 7/19     Sciatica of left side 12/13    Dr. Helen Ricardo 2004     SVT (supraventricular tachycardia) (H) 4/16    on ziopatch     Thrombocytopenia (H) 2014       Past Surgical History   I have reviewed this patient's surgical history and updated it with pertinent information if needed.  Past Surgical History:   Procedure Laterality Date     BONE MARROW BIOPSY,  BONE SPECIMEN, NEEDLE/TROCAR N/A 2016    Procedure: BIOPSY BONE MARROW;  Surgeon: Bryan Patel MD;  Location:  GI     CATARACT IOL, RT/LT        SECTION  1965, 1966     COLONOSCOPY  2013    Procedure: COLONOSCOPY;  COLONOSCOPY;  Surgeon: Steffany Rockwell MD;  Location:  GI     EP ABLATION AV NODE N/A 2019    Procedure: EP Ablation AV Node;  Surgeon: Lee Menchaca MD;  Location:  HE[facility]ARD[facility] CATH LAB     EP PACEMAKER N/A 2019    Procedure: EP Pacemaker;  Surgeon: Lee Menchaca MD;  Location:  HEART CARDIAC CATH LAB     EXCISE EXOSTOSIS TIBIA / FIBULA  2014    Procedure: EXCISE EXOSTOSIS TIBIA / FIBULA;  Surgeon: Naila Pichardo MD;  Location:  SD     hysteroscopy and d and c      due to bleeding     left anle replacement       right ankle surgery         Prior to Admission Medications   Prior to Admission Medications   Prescriptions Last Dose Informant Patient Reported? Taking?   Carboxymethylcellulose Sod PF (REFRESH PLUS) 0.5 % SOLN ophthalmic solution  Care Giver Yes No   Si drop 4 times daily as needed for dry eyes   Multiple Vitamins-Minerals (DAILY MULTIVITAMIN) CAPS  Care Giver Yes No   Sig: Take 1 tablet by mouth daily    SIMBRINZA 1-0.2 % ophthalmic suspension  Care Giver Yes No   Sig: Place 1 drop into the right eye 2 times daily 1 drop AM and PM   acetaminophen (TYLENOL) 500 MG tablet  Care Giver Yes No   Sig: Take 500 mg by mouth every 6 hours as needed for mild pain    furosemide (LASIX) 20 MG tablet   No No   Sig: Take 3 (60mg) tablets daily, if weight > 137 pounds take an additional 20mg (1 tablet)   Patient taking differently: Take 20 mg by mouth daily Take 3 (60mg) tablets daily, if weight > 137 pounds take an additional 20mg (1 tablet)   latanoprost (XALATAN) 0.005 % ophthalmic solution  Care Giver Yes No   Sig: Place 1 drop into the right eye At Bedtime   lidocaine-prilocaine (EMLA) cream  Care Giver No No    Sig: Apply to port site 1 hour prior to access   nystatin (MYCOSTATIN) 383734 UNIT/GM external cream   No No   Sig: Apply topically 2 times daily   order for DME   No No   Sig: Equipment being ordered: Nebulizer with tubes/mask/acessories, use as directed   potassium chloride ER (KLOR-CON M) 20 MEQ CR tablet   No No   Sig: Take 1 tablet 2 x a day, with an additional 1 tablet on days when you take extra fursoemide   Patient taking differently: Take 20 mEq by mouth daily Take 1 tablet 2 x a day, with an additional 1 tablet on days when you take extra fursoemide   sodium fluoride 1.1 % CREA  Care Giver Yes No   Sig: Apply to affected area 3 times daily   triamcinolone (KENALOG) 0.1 % external cream   No No   Sig: Apply topically 2 times daily   warfarin ANTICOAGULANT (COUMADIN) 4 MG tablet   No No   Sig: TAKE 1 tablet ( 4 mg) BY MOUTH every Tue, Thu, Sat; and take 1/2 tablet (2 mg) all other days of the week or as directed by your ACC Team.      Facility-Administered Medications: None     Allergies   Allergies   Allergen Reactions     Blood Transfusion Related (Informational Only)      Blood antigens related to receiving Darzelex-AG     Penicillin [Penicillins] Rash     Blotches on chest        Social History   I have reviewed this patient's social history and updated it with pertinent information if needed. Amira Arreola  reports that she has never smoked. She has never used smokeless tobacco. She reports that she does not drink alcohol and does not use drugs.    Family History   I have reviewed this patient's family history and updated it with pertinent information if needed.   Family History   Problem Relation Age of Onset     Heart Disease Father      C.A.D. Mother      Cerebrovascular Disease Brother      Family History Negative Sister      Family History Negative Sister      Family History Negative Brother        Review of Systems   CONSTITUTIONAL:  positive for  fevers, chills, fatigue, malaise and  anorexia  EYES:  negative  HEENT:  negative  RESPIRATORY:  negative  CARDIOVASCULAR:  negative  GASTROINTESTINAL:  negative  GENITOURINARY:  negative  INTEGUMENT/BREAST:  negative  HEMATOLOGIC/LYMPHATIC:  negative  ALLERGIC/IMMUNOLOGIC:  negative  ENDOCRINE:  negative  MUSCULOSKELETAL:  negative  NEUROLOGICAL:  negative  BEHAVIOR/PSYCH:  negative    Physical Exam   Temp: 99.4  F (37.4  C) Temp src: Axillary BP: (!) 156/82 Pulse: 78   Resp: 14 SpO2: 95 % O2 Device: None (Room air)    Vital Signs with Ranges  Temp:  [99.4  F (37.4  C)] 99.4  F (37.4  C)  Pulse:  [70-78] 78  Resp:  [14] 14  BP: (146-156)/(67-82) 156/82  SpO2:  [93 %-95 %] 95 %  125 lbs 10.6 oz    Constitutional: Lethargic, fatigue, elderly lady, no apparent distress.  Eyes: Conjunctiva and pupils examined and normal.  HEENT: dry mucous membranes,   Respiratory: diminished air entry bilaterally, no crackles or wheezing.  Cardiovascular: Regular rate and rhythm, normal S1 and S2, and no murmur noted.  GI: Soft, non-distended, non-tender, normal bowel sounds.  Lymph/Hematologic: No anterior cervical or supraclavicular adenopathy.  Skin: No rashes, no cyanosis, no edema.  Musculoskeletal: No joint swelling, erythema or tenderness.  Neurologic: no focal deficit. .      Data   Data reviewed today:  I personally reviewed the EKG tracing showing ventricular paced rhythm .  Recent Labs   Lab 08/21/22  1800 08/18/22  1521   WBC 4.2  --    HGB 10.3*  --    MCV 92  --      --    INR  --  3.3*     --    POTASSIUM 4.0  --    CHLORIDE 108  --    CO2 22  --    BUN 9  --    CR 0.68  --    ANIONGAP 7  --    BRADLEY 9.8  --    *  --    ALBUMIN 3.4  --    PROTTOTAL 6.1*  --    BILITOTAL 0.8  --    ALKPHOS 45  --    ALT 15  --    AST 29  --        No results found for this or any previous visit (from the past 24 hour(s)).

## 2022-08-22 NOTE — UTILIZATION REVIEW
[facility] [place] Utilization Review  Admission Status; Secondary Review Determination     Admission Date: 8/21/2022  5:44 PM      Under the authority of the Utilization Management Committee, the utilization review process indicated a secondary review on the above patient.  The review outcome is based on review of the medical records, discussions with staff, and applying clinical experience noted on the date of the review.        (X)      Inpatient Status Appropriate - This patient's medical care is consistent with medical management for inpatient care and reasonable inpatient medical practice.          RATIONALE FOR DETERMINATION   89 yo F with history of multiple myeloma, admitted with fever, chills, weakness, fatigue, nausea, vomiting, diarrhea and dehydration.  Chest x-ray does not show any infiltrates, CRP mildly elevated, patient found positive for COVID-19 infection with generalized weakness, started on dexamethasone and remdesivir.  Also receiving gentle IV fluid hydration with diarrhea and dehydration.  Urinalysis positive, urine cultures pending, started on IV ceftriaxone.  Complex immunosuppressed patient who is positive for COVID-19 infection with generalized weakness, now receiving dexamethasone and remdesivir, needs close monitoring in the hospital and is at risk of acute decompensation, recommend continue inpatient status.      The severity of illness, intensity of service provided, expected LOS and risk for adverse outcome make the care complex, high risk and appropriate for hospital admission.The patient requires hospital based medical care which is anticipated to require a stay of 2 or more midnights; according to CMS guidelines the patient should be admitted as inpatient        The information on this document is developed by the utilization review team in order for the business office to ensure compliance.  This only denotes the appropriateness of proper admission status and does not reflect  the quality of care rendered.         The definitions of Inpatient Status and Observation Status used in making the determination above are those provided in the CMS Coverage Manual, Chapter 1 and Chapter 6, section 70.4.      Sincerely,       Juliocesar Davidson MD  Physician Advisor  Utilization Review-[place]    Phone: [phone]

## 2022-08-22 NOTE — H&P
Admitted: 08/21/2022    HISTORY OF PRESENT ILLNESS:  This is a 89-year-old female with history of multiple myeloma in remission, CHF, diastolic ascending aortic dilation, compression fractures, hypertension, moderate to severe pulmonary hypertension, paroxysmal atrial fibrillation, on chronic anticoagulation with Coumadin, hyperlipidemia, status post permanent pacemaker placement and dementia, was brought to the ED was feeling generalized weak, fatigued, tired, not eating well and found to have fever as well.    The patient is a very poor historian.  Most of the history is obtained from medical records, talking to the ED physician and the patient's daughter at the bedside.  They noted that they have noticed that the patient's memory is not very good.  She used to walk with a walker.  She still can, but mostly in a wheelchair now.  She noticed that she started having fever and chills, feeling weak, tired and fatigued and not eating anything.  She started having some nausea and diarrhea as well for the last 2 days, so they brought her to the ER for evaluation.  In the ED, she was found to have positive COVID, even though she has been vaccinated and boosted and the urine looks infected, so the Hospitalist Service was consulted to admit the patient.    ASSESSMENT AND PLAN:    1.  Generalized weakness secondary to COVID-19 infection:  This is a 89-year-old female with multiple comorbidities including multiple myeloma in the past, who presented with fever, chills, weakness, fatigue, nausea, vomiting, diarrhea and quite dehydrated at this time.  I will admit her.  Order chest x-ray, ESR, CRP.  Given her comorbidities, she is at high risk for progression, so we will start her on dexamethasone and IV remdesivir for 3 days.  We will see how she does.  I will keep her on gentle IV hydration as she having diarrhea too and eating much and looked quite dehydrated for overnight.  We will discontinue that in the morning.  2.   Possible urinary tract infection:  The patient, although has no symptoms, but she is demented and then the UA positive for WBC of 12, mild leukocyte esterase positive.  She was started on IV ceftriaxone in the ED.  We will continue with that.  Send urine culture.  3.  History of congestive heart failure:  She is on Lasix.  We can resume it from tomorrow if her oral intake improves.  4.  Hypertension:  Reasonably controlled at this time.  5.  History of paroxysmal atrial fibrillation, status post pacemaker placement:  Currently ventricular rhythm chronic anticoagulation with Coumadin.  We will ask the pharmacy to dose her Coumadin.  6.  History of multiple myeloma:  In remission.  History of compression fracture because of that.  7.  DVT prophylaxis:  With Coumadin.    CODE STATUS:  I had a detailed discussion with the patient's daughter who is not a POA, but she is talking to her sister.  They will let us know about the code status for now, we will keep her FULL CODE and once  they want to change it, then we can change it to DNR/DNI if they tell us to.    The case discussed with ER physician and the nursing staff taking care of the patient.    Abdiaziz Ramirez MD        D: 2022   T: 2022   MT: NATHALY    Name:     ALBERTO PARSON  MRN:      [id number]        Account:     [id number]   :      1932           Admitted:    2022       Document: [id number]

## 2022-08-23 NOTE — PROGRESS NOTES
08/23/22 1500   Quick Adds   Type of Visit Initial Occupational Therapy Evaluation   Living Environment   People in Home child(ezra), adult  (son)   Current Living Arrangements house   Home Accessibility wheelchair accessible   Living Environment Comments Lives with son, daughter lives nearby.   Self-Care   Usual Activity Tolerance fair   Current Activity Tolerance poor   Regular Exercise No   Equipment Currently Used at Home walker, rolling;wheelchair, manual;shower chair   Activity/Exercise/Self-Care Comment Per phone conversation with daughter, patient lives with son. Son is retired and is able to be home with patient most of the time. She has home care aides that come 3-4 hours per day and assist with bathing, dressing, toileting, changing brief, etc. Patient has shower chair and uses 4WW to get around the house. Patient has had functional decline over the past few weeks and having use wheelchair for mobility and wasn't able to walk much. She was needing help with eating and taking pills prior to admission. Per daughter, if son is not home she has 6 children and someone is always able to be there with the patient.   General Information   Onset of Illness/Injury or Date of Surgery 08/21/22   Referring Physician Abdiaziz Ramirez MD   Additional Occupational Profile Info/Pertinent History of Current Problem 89-year-old female with history of multiple myeloma in remission, CHF, diastolic ascending aortic dilation, compression fractures, hypertension, moderate to severe pulmonary hypertension, paroxysmal atrial fibrillation, on chronic anticoagulation with Coumadin, hyperlipidemia, status post permanent pacemaker placement and dementia, was brought to the ED was feeling generalized weak, fatigued, tired, not eating well and found to have fever as well.  COVID+   Existing Precautions/Restrictions fall   Limitations/Impairments hearing   Cognitive Status Examination   Orientation Status person   Affect/Mental  Status (Cognitive) WNL   Follows Commands follows one-step commands;50-74% accuracy   Strength Comprehensive (MMT)   Comment, General Manual Muscle Testing (MMT) Assessment strength deficits noted during functional activities   Bed Mobility   Bed Mobility supine-sit;sit-supine   Supine-Sit [place] (Bed Mobility) maximum assist (25% patient effort)   Sit-Supine [place] (Bed Mobility) dependent (less than 25% patient effort)   Assistive Device (Bed Mobility) draw sheet   Transfers   Transfers sit-stand transfer   Sit-Stand Transfer   Sit-Stand [place] (Transfers) maximum assist (25% patient effort)   Assistive Device (Sit-Stand Transfers) walker, front-wheeled   Activities of Daily Living   BADL Assessment/Intervention feeding   Eating/Self Feeding   [place] Level (Feeding) dependent (less than 25% patient effort)   Clinical Impression   Criteria for Skilled Therapeutic Interventions Met (OT) Yes, treatment indicated   OT Diagnosis below baseline with ADLS and mobility   OT Problem List-Impairments impacting ADL activity tolerance impaired;balance;cognition;mobility;strength   Assessment of Occupational Performance 3-5 Performance Deficits   Identified Performance Deficits bathing, dressing, feeding, grooming, transfers   Planned Therapy Interventions (OT) ADL retraining;transfer training;balance training;cognition;strengthening   Clinical Decision Making Complexity (OT) moderate complexity   Risk & Benefits of therapy have been explained evaluation/treatment results reviewed;care plan/treatment goals reviewed;risks/benefits reviewed;current/potential barriers reviewed;participants voiced agreement with care plan;participants included;patient;daughter   OT Discharge Planning   OT Discharge Recommendation (DC Rec) Transitional Care Facility   OT Rationale for DC Rec Patient is below baseline with ADL and mobility. She has good support at home from family and caregiver. She is limited by confusion,  decreased activity tolerance, strength and balance. She is currently needing assist x 2 for mobility and will benefit from TCU at discharge to improve independence with ADL and mobility and decrease burden of care at discharge to [facility].   OT Goals   Therapy Frequency (OT) 2 times/wk   OT Predicted Duration/Target Date for Goal Attainment 08/30/22   OT Goals Hygiene/Grooming;Transfers;Cognition   OT: Hygiene/Grooming supervision/stand-by assist   OT: Transfer Minimal assist;with assistive device   OT: Cognitive Patient/caregiver will verbalize understanding of cognitive assessment results/recommendations as needed for safe discharge planning

## 2022-08-23 NOTE — PLAN OF CARE
DATE & TIME: 8/22/22 5313-4898    Cognitive Concerns/ Orientation : Pt A/Ox1, oriented to self. Confused.    BEHAVIOR & AGGRESSION TOOL COLOR: Green   ABNL VS/O2: VSS on RA  MOBILITY: Lift, fall risk. Reposition q2h.  PAIN MANAGMENT: Denies  DIET: Regular, total feed  BOWEL/BLADDER: Incontinent of urine, purewick in place  ABNL LAB/BG: CRP 24.6 (decreasing)/Hgb 11.4/INR 1.68/D-dimer 0.80  DRAIN/DEVICES: IVF infusing  SKIN: Scattered bruising. Mepilex on coccyx for blanchable redness  D/C DATE: Discharge pending progress  OTHER IMPORTANT INFO: Special precautions in place. Neuros unchanged. CMS intact.

## 2022-08-23 NOTE — PLAN OF CARE
Goal Outcome Evaluation:    Plan of Care Reviewed With: patient     Overall Patient Progress: no change         Summary:  COVID. UTI   DATE & TIME: 8/22/22 Pm shift   Cognitive Concerns/ Orientation : Alert and disoriented to time and situation. Intermittent confusion. [place]   BEHAVIOR & AGGRESSION TOOL COLOR: Green  CIWA SCORE: NA   ABNL VS/O2: VSS, on RA  MOBILITY: Lift. Total T&R q 2hrs.  PAIN MANAGMENT: Denies  DIET: Regular. Total feed.  BOWEL/BLADDER: incontinent of urine. Purewick in place  ABNL LAB/BG: D dimer 0.80, INR 1.68, CRP 24.6   DRAIN/DEVICES: PIV infusing NS @ 75ml/hr  TELEMETRY RHYTHM: NA  SKIN: Mepilex on coccyx for redness non blanchable.  TESTS/PROCEDURES: none  D/C DAY/GOALS/PLACE: pending improvement.  OTHER IMPORTANT INFO: Special precaution maintained.

## 2022-08-23 NOTE — PLAN OF CARE
Summary: Weakness/chills/fever/diarrhea/vomiting on admission, dx with UTI and COVID  DATE & TIME: 8/23/2022 3732-5276   Cognitive Concerns/ Orientation : A&O x1-2 oriented to self/place at times. Did know it was August this AM, waxes and wanes with hx dementia baseline    BEHAVIOR & AGGRESSION TOOL COLOR: Green   ABNL VS/O2: VSS on RA  MOBILITY: Lift, fall risk. Reposition q2h in bed; Up to chair today x2- does have BLE strength but is not following commands well to stand and get to chair so lift for safety. Daughter states baseline walker and can walk in house but not for long distances. PT/OT following  PAIN MANAGMENT: Denies  DIET: Regular, total feed to assist in intake, poor/fair appetite but only eating/drinking when prompted  BOWEL/BLADDER: Incontinent of urine, purewick in place and changed x1, no BM  ABNL LAB/BG: D-dimer 1.34 (worse today), CRP 24.2 (slightly improved), INR 2.09  DRAIN/DEVICES: PIV infusing NS a 75 ml/hr  SKIN: Scattered bruising. Mepilex on coccyx for blanchable redness  D/C DATE: Discharge pending mobility, tolerating PO and completion of remdesivir (8/24 last dose). Will need TCU  OTHER IMPORTANT INFO: Special precautions in place. Infrequent nonproductive cough. Denies SOB but is SAALZAR. Continue IV decadron/rocephin/remdesivir. Neuros/CMS intact, MD said ok to discontinue frequent checks. Daughter Desiree updated- patient lives with family and staff members that come into house to assist patient

## 2022-08-23 NOTE — PROGRESS NOTES
08/23/22 0900   Quick Adds   Type of Visit Initial PT Evaluation   Living Environment   Living Environment Comments Unable to self report, per RN and chart lives with family member   Self-Care   Usual Activity Tolerance fair   Current Activity Tolerance poor   Equipment Currently Used at Home walker, standard   Fall history within last six months yes   Activity/Exercise/Self-Care Comment Per RN and family, PLOF able to amb 10 ft with walker. Sedentary at baseline.   General Information   Onset of Illness/Injury or Date of Surgery 08/21/22   Referring Physician Abdiaziz Ramirez MD   Patient/Family Therapy Goals Statement (PT) Unable to state   Pertinent History of Current Problem (include personal factors and/or comorbidities that impact the POC) 89 yo female brought to the ED was feeling generalized weak, fatigued, tired, not eating well and found to have fever as well.   Existing Precautions/Restrictions fall   Cognition   Affect/Mental Status (Cognition) confused   Orientation Status (Cognition) unable/difficult to assess   Follows Commands (Cognition) physical/tactile prompts required;unable/difficult to assess   Integumentary/Edema   Integumentary/Edema Comments None observed   Posture    Posture Comments Kyphotic posture   Range of Motion (ROM)   ROM Comment Unable to assess due to limited participation   Strength (Manual Muscle Testing)   Strength Comments BLE against gravity, dificult to fully assess due to limited participation   Bed Mobility   Comment, (Bed Mobility) MaxA x2   Transfers   Comment, (Transfers) modA x2 with kelly steady   Gait/Stairs (Locomotion)   Comment, (Gait/Stairs) Unable to asses   Balance   Balance Comments Poor sitting balance, poor standing posture   Clinical Impression   Criteria for Skilled Therapeutic Intervention Yes, treatment indicated   PT Diagnosis (PT) Decreased functional mobility   Influenced by the following impairments Decreased strength and ROM, decreased activity  tolerance   Functional limitations due to impairments Decreased functional mobility   Clinical Presentation (PT Evaluation Complexity) Stable/Uncomplicated   Clinical Presentation Rationale Clinical judgement   Clinical Decision Making (Complexity) low complexity   Planned Therapy Interventions (PT) balance training;bed mobility training;gait training;strengthening;patient/family education;transfer training;progressive activity/exercise;risk factor education;home program guidelines   Risk & Benefits of therapy have been explained evaluation/treatment results reviewed;care plan/treatment goals reviewed;risks/benefits reviewed;current/potential barriers reviewed;participants included;patient   PT Discharge Planning   PT Discharge Recommendation (DC Rec) Transitional Care Facility;home with assist;home with home care physical therapy   PT Rationale for DC Rec Pt below baseline with all functional mobility, currently requies mod-maxA 2 persons. Previously was amb short distances with FWW and assist at home. Would recommend skilled PT in TCU setting to progress strength and functional mobility towards independence. If returning home, would require assist of 2 persons for all cares and mobility, lift, and wheelchair with HHPT to progress function.   PT Brief overview of current status 2 person assist all mobility   Plan of Care Review   Plan of Care Reviewed With patient   Total Evaluation Time   Total Evaluation Time (Minutes) 5   Physical Therapy Goals   PT Frequency 3x/week   PT Predicted Duration/Target Date for Goal Attainment 08/30/22   PT Goals Bed Mobility;Transfers;Gait   PT: Bed Mobility Supine to/from sit;Supervision/stand-by assist   PT: Transfers Supervision/stand-by assist;Sit to/from stand;Assistive device;Bed to/from chair   PT: Gait Supervision/stand-by assist;10 feet;Standard walker

## 2022-08-23 NOTE — PROGRESS NOTES
[facility]    Hospitalist Progress Note    Brief Summary:    This is a 89-year-old female with history of multiple myeloma in remission, CHF, diastolic ascending aortic dilation, compression fractures, hypertension, moderate to severe pulmonary hypertension, paroxysmal atrial fibrillation, on chronic anticoagulation with Coumadin, hyperlipidemia, status post permanent pacemaker placement and dementia, was brought to the ED was feeling generalized weak, fatigued, tired, not eating well and found to have fever as well.    Assessment & Plan     1.  Generalized weakness secondary to COVID-19 infection:  This is a 89-year-old female with multiple comorbidities including multiple myeloma in the past, who presented with fever, chills, weakness, fatigue, nausea, vomiting, diarrhea and quite dehydrated at this time. Admit as inpatient,   Order chest x-ray, ESR, CRP reviewed, no significant infiltrate and CRP only mildly elevated, but given her comorbidities, she is at high risk for progression, so started her on dexamethasone and IV remdesivir for 3 days.   I will keep her on gentle IV hydration as she having diarrhea too and eating much and looked quite dehydrated.   She is much better after IV fluids and dexamethasone, remain afebrile and on room air at this time. Continue 3 days of remdesivir and dexamethasone at this time while in the hospital     2.  Possible urinary tract infection:  The patient, although has no symptoms, but she is demented and then the UA positive for WBC of 12, mild leukocyte esterase positive.  She was started on IV ceftriaxone started in ED, Urine culture no growth yet, if remain negative will stop the antibiotics.   3.  History of congestive heart failure:  She is on Lasix.   4.  Hypertension:  Reasonably controlled at this time.  5.  History of paroxysmal atrial fibrillation, status post pacemaker placement:  Currently ventricular rhythm chronic anticoagulation with  Coumadin.  We will ask the pharmacy to dose her Coumadin.  6.  History of multiple myeloma:  In remission.  History of compression fracture because of that.  7.  Chronic Diastolic Heart Failure: stable, on lasix. No acute exacerbation   8.  Acquired coagulopathy secondary to coumadin.       Overall improving now.  Encourage her oral intake, if adequate input will stop the IV fluids  PT and OT today     DVT Prophylaxis: Warfarin  Code Status: No CPR- Do NOT Intubate    Disposition: Expected discharge in 1-2 days if improve.     Abdiaziz Ramirez MD, MD  Text Page  (7am - 6pm)    Interval History   Patient much more awake and alert his morning, no much coughing today, denies any pain, remain pleasantly confused.   No fever, chills, chest pain, SOB, nausea or vomiting at this time, no diarrhea.     -Data reviewed today: I reviewed all new labs and imaging results over the last 24 hours. I personally reviewed no images or EKG's today.    Physical Exam   Temp: 98.3  F (36.8  C) Temp src: Oral BP: 117/69 Pulse: 70   Resp: 18 SpO2: 95 % O2 Device: None (Room air)    Vitals:    08/21/22 1752 08/22/22 2144 08/23/22 0617   Weight: 57 kg (125 lb 10.6 oz) 57.5 kg (126 lb 12.2 oz) 58.6 kg (129 lb 3 oz)     Vital Signs with Ranges  Temp:  [97.3  F (36.3  C)-98.3  F (36.8  C)] 98.3  F (36.8  C)  Pulse:  [70-77] 70  Resp:  [18] 18  BP: (117-152)/(69-86) 117/69  SpO2:  [92 %-99 %] 95 %  I/O last 3 completed shifts:  In: 60 [P.O.:60]  Out: 400 [Urine:400]    Constitutional: awake and alert today, more interactively and answering questions.   Eyes: Lids and lashes normal, pupils equal, round and reactive to light, extra ocular muscles intact, sclera clear, conjunctiva normal  Respiratory: No increased work of breathing, good air exchange, clear to auscultation bilaterally, no crackles or wheezing  Cardiovascular: Normal apical impulse, regular rate and rhythm, normal S1 and S2, no S3 or S4, and no murmur noted  GI: No scars, normal bowel  sounds, soft, non-distended, non-tender, no masses palpated, no hepatosplenomegally  Skin: no bruising or bleeding  Musculoskeletal: no edema.  Neurologic: no focal deficit, but pleasantly confused.     Medications     - MEDICATION INSTRUCTIONS -       sodium chloride 75 mL/hr at 08/23/22 0411     Warfarin Therapy Reminder         remdesivir  100 mg Intravenous Q24H    And     sodium chloride 0.9%  50 mL Intravenous Q24H     cefTRIAXone  1 g Intravenous Q24H     dexamethasone  6 mg Intravenous Daily     furosemide  20 mg Oral Daily     latanoprost  1 drop Right Eye At Bedtime     multivitamin, therapeutic  1 tablet Oral Daily     potassium chloride ER  20 mEq Oral Daily     sodium chloride (PF)  3 mL Intracatheter Q8H       Data   Recent Labs   Lab 08/22/22  0814 08/21/22  1800 08/18/22  1521   WBC 5.6 4.2  --    HGB 11.4* 10.3*  --    MCV 92 92  --    * 167  --    INR 1.68*  --  3.3*    137  --    POTASSIUM 3.9 4.0  --    CHLORIDE 111* 108  --    CO2 20 22  --    BUN 9 9  --    CR 0.58 0.68  --    ANIONGAP 8 7  --    BRADLEY 9.8 9.8  --    * 120*  --    ALBUMIN 3.3* 3.4  --    PROTTOTAL 6.5* 6.1*  --    BILITOTAL 0.6 0.8  --    ALKPHOS 47 45  --    ALT 17 15  --    AST 25 29  --        No results found for this or any previous visit (from the past 24 hour(s)).

## 2022-08-24 NOTE — PROGRESS NOTES
"CLINICAL NUTRITION SERVICES  -  ASSESSMENT NOTE    Recommendations Ordered by Registered Dietitian (RD):   - Trial [place] w/ dinner   - Thera vit M daily    Malnutrition:   % Weight Loss:  > 10% in 6 months (severe malnutrition)  % Intake:  </= 75% for >/= 1 month (severe malnutrition)  Subcutaneous Fat Loss:  Deferred- COVID precautions  Muscle Loss:  Deferred- COVID precautions   Fluid Retention:  None noted    Malnutrition Diagnosis: Severe malnutrition  In Context of:  Acute illness or injury     REASON FOR ASSESSMENT  Amira Arreola is a 89 year old female seen by Registered Dietitian for Nutrition Admission Risk Screen Received -   Have you recently lost weight without trying in the last 6 months ? - \"unsure\"  Have you been eating poorly due to a decreased appetite ?- \"no\"    NUTRITION HISTORY  - Information obtained from daughters Desiree and Yesi by phone.   - Unable to see patient at bedside due to COVID precautions.   - Admitted with weakness, having diarrhea, and poor appetite.   - Lives with son, daughter nearby.   - Pt has low appetite in the mornings, but will start to eat fruit or a [place] later in the morning.   - Lunch: [place] cut into quarters (usually eats 1-2 quarters).   - Dinner: balanced meal, eats small portions. Usually a meat, starch, veg. (son cooks)  - Daughter Yesi has noticed that Amira has been eating less over the past few months. Usually she is a good eater.   - No supplement use, tried Ensure but it gave her an upset stomach.     CURRENT NUTRITION ORDERS  Diet Order:     Regular     Current Intake/Tolerance:  25-50% intakes recorded. Pt is total feed. Eats and drinks only when encouraged.   A/o x1    NUTRITION FOCUSED PHYSICAL ASSESSMENT FOR DIAGNOSING MALNUTRITION)  No:  Deferred due to COVID precautions     ANTHROPOMETRICS  Height: 5' 3\"  Weight: 57.5 kg   Body mass index is 22.5 kg/m .  Weight Status:  Normal BMI  IBW: 52.3 kg   % IBW: 110%  Weight History: up to a " 30# weight loss indicated from February to August. 19%  Wt Readings from Last 10 Encounters:   08/24/22 61.2 kg (134 lb 14.7 oz)   07/01/22 56.7 kg (125 lb)   04/19/22 64.5 kg (142 lb 1.6 oz)   02/10/22 70.3 kg (155 lb)   12/16/21 70.1 kg (154 lb 9.6 oz)   08/24/21 65.8 kg (145 lb)   04/26/21 68.2 kg (150 lb 6.4 oz)   04/13/21 68.5 kg (151 lb)   01/14/21 67.7 kg (149 lb 4.8 oz)   08/31/20 63.5 kg (139 lb 14.4 oz)       LABS  Labs reviewed    MEDICATIONS  Medications reviewed  Lasix 20 mg daily   Thera vit M daily   Potassium Chloride daily     ASSESSED NUTRITION NEEDS PER APPROVED PRACTICE GUIDELINES:  Dosing Weight 57.5 kg   Estimated Energy Needs: 3693-3327 kcals (25-30 Kcal/Kg)  Justification: repletion  Estimated Protein Needs: 69-86 grams protein (1.2-1.5 g pro/Kg)  Justification: Repletion and preservation of lean body mass  Estimated Fluid Needs: 1 mL/kcal   Justification: maintenance    MALNUTRITION:  % Weight Loss:  > 10% in 6 months (severe malnutrition)  % Intake:  </= 75% for >/= 1 month (severe malnutrition)  Subcutaneous Fat Loss:  Deferred- COVID precautions  Muscle Loss:  Deferred- COVID precautions   Fluid Retention:  None noted    Malnutrition Diagnosis: Severe malnutrition  In Context of:  Acute illness or injury    NUTRITION DIAGNOSIS:  Unintended weight loss related to suspected inadequate oral intake in the setting of advanced age as evidenced by daughter reports progressively worsening appetite over the past few months with subsequent weight loss.     NUTRITION INTERVENTIONS  Recommendations / Nutrition Prescription  Regular Diet  [place] w/ dinner tray     Implementation  Nutrition education: Per Provider order if indicated   Medical Food Supplement: as above    Nutrition Goals  Intake of 50% meals including supplement.     MONITORING AND EVALUATION:  Progress towards goals will be monitored and evaluated per protocol and Practice Guidelines    Hannah Muniz RD, LD  [facility] [place], 66, [facility],  [facility]  Pager: [phone]  Weekend Pager: [phone]

## 2022-08-24 NOTE — PLAN OF CARE
Goal Outcome Evaluation:    Plan of Care Reviewed With: daughter     Overall Patient Progress: no change    Outcome Evaluation: Tolerating small amounts of PO. Trial [place] supplement w/ dinner.

## 2022-08-24 NOTE — PLAN OF CARE
DATE & TIME: 8/23/22 2788-0016    Cognitive Concerns/ Orientation : Pt A/Ox1, oriented to self. Pt thinks she is in her room at home. Pleasantly confused.   BEHAVIOR & AGGRESSION TOOL COLOR: Green   ABNL VS/O2: VSS on RA  MOBILITY: Lift, fall risk. Reposition q2h.  PAIN MANAGMENT: Denies  DIET: Regular, total feed. Pt needs encouragement to drink liquids but will when helped.  BOWEL/BLADDER: Incontinent of urine, purewick in place.  ABNL LAB/BG: CRP 24.2 (trending down)/Hgb 11.3/INR 2.09/D-dimer 1.34  DRAIN/DEVICES: IVF infusing  SKIN: Scattered bruising. Mepilex on coccyx for blanchable redness.  D/C DATE: Discharge pending mobility and tolerating PO. Pt will need TCU at discharge.  OTHER IMPORTANT INFO: PT/OT following. Lung sounds diminished. Dry, infrequent, nonproductive cough. SALAZAR. Continues on IV decadron/rocephin/remdesivir.

## 2022-08-24 NOTE — PROGRESS NOTES
[facility]    Hospitalist Progress Note    Brief Summary:    This is a 89-year-old female with history of multiple myeloma in remission, CHF, diastolic ascending aortic dilation, compression fractures, hypertension, moderate to severe pulmonary hypertension, paroxysmal atrial fibrillation, on chronic anticoagulation with Coumadin, hyperlipidemia, status post permanent pacemaker placement and dementia, was brought to the ED was feeling generalized weak, fatigued, tired, not eating well and found to have fever as well.    Assessment & Plan     1.  Generalized weakness secondary to COVID-19 infection:  This is a 89-year-old female with multiple comorbidities including multiple myeloma in the past, who presented with fever, chills, weakness, fatigue, nausea, vomiting, diarrhea and quite dehydrated at this time. Admit as inpatient,   Order chest x-ray, ESR, CRP reviewed, no significant infiltrate and CRP only mildly elevated, but given her comorbidities, she is at high risk for progression, so started her on dexamethasone and IV remdesivir for 3 days.  Kept her on gentle IV hydration as she having diarrhea too and eating much and looked quite dehydrated.   She is much better after IV fluids and dexamethasone, remain afebrile and on room air at this time. Continue 3 days of remdesivir and dexamethasone at this time while in the hospital, likely stop both from tomorrow.  She is otherwise stable, only in hospital for weakness rather than for COVID may need only 5 days of COVID precautions, will involve infection prevention to evaluate the case, so she can be discharge to TCU early.  Stop IV fluids now.  She is on coumadin with therapeutic INR     2.  Possible urinary tract infection:  The patient, although has no symptoms, but she is demented and then the UA positive for WBC of 12, mild leukocyte esterase positive.  She was started on IV ceftriaxone started in ED, Urine culture no growth. Stop IV  Ceftriaxone today 8/24/22    3.  History of congestive heart failure:  She is on Lasix.     4.  Hypertension:  Reasonably controlled at this time.    5.  History of paroxysmal atrial fibrillation, status post pacemaker placement:  Currently ventricular rhythm chronic anticoagulation with Coumadin.  INR is therapeutic     6.  History of multiple myeloma:  In remission.  History of compression fracture because of that  .  7.  Chronic Diastolic Heart Failure: stable, on lasix. No acute exacerbation     8.  Acquired coagulopathy secondary to coumadin.     Overall stable and improve now.   Continue to be weak and deconditioned and will need TCU placement.   Stop IV fluids, ceftriaxone now.  Stop Remdesivir and Dexamethasone from tomorrow.      DVT Prophylaxis: Warfarin  Code Status: No CPR- Do NOT Intubate     Discussed with  and the nursing staff taking care of the patient today    Discussed with the patient daughter Desiree MD [place] on 8/23      Disposition: Expected discharge will need TCU and ready to discharge tomorrow at this time from medical standpoint.      Abdiaziz Ramirez MD, MD  Text Page  (7am - 6pm)    Interval History   Patient awake and alert, remain on room air, afebrile, oral intake slowly improving. Remain pleasantly confused.     -Data reviewed today: I reviewed all new labs and imaging results over the last 24 hours. I personally reviewed no images or EKG's today.    Physical Exam   Temp: 98  F (36.7  C) Temp src: Axillary BP: 126/79 Pulse: 70   Resp: 16 SpO2: 97 % O2 Device: None (Room air)    Vitals:    08/22/22 2144 08/23/22 0617 08/24/22 0630   Weight: 57.5 kg (126 lb 12.2 oz) 58.6 kg (129 lb 3 oz) 61.2 kg (134 lb 14.7 oz)     Vital Signs with Ranges  Temp:  [97.2  F (36.2  C)-98  F (36.7  C)] 98  F (36.7  C)  Pulse:  [70-71] 70  Resp:  [16-18] 16  BP: (126-137)/(70-87) 126/79  SpO2:  [96 %-98 %] 97 %  I/O last 3 completed shifts:  In: 330 [P.O.:330]  Out: 800  [Urine:800]    Constitutional: awake and alert today, more interactively and answering questions.   Eyes: Lids and lashes normal, pupils equal, round and reactive to light, extra ocular muscles intact, sclera clear, conjunctiva normal  Respiratory: No increased work of breathing, good air exchange, clear to auscultation bilaterally, no crackles or wheezing  Cardiovascular: Normal apical impulse, regular rate and rhythm, normal S1 and S2, no S3 or S4, and no murmur noted  GI: No scars, normal bowel sounds, soft, non-distended, non-tender, no masses palpated, no hepatosplenomegally  Skin: no bruising or bleeding  Musculoskeletal: no edema.  Neurologic: no focal deficit, but pleasantly confused.     Medications     - MEDICATION INSTRUCTIONS -       Warfarin Therapy Reminder         remdesivir  100 mg Intravenous Q24H    And     sodium chloride 0.9%  50 mL Intravenous Q24H     brimonidine  1 drop Right Eye BID     dexamethasone  6 mg Intravenous Daily     dorzolamide  1 drop Right Eye BID     enoxaparin ANTICOAGULANT  40 mg Subcutaneous Q24H     furosemide  20 mg Oral Daily     latanoprost  1 drop Right Eye At Bedtime     multivitamin, therapeutic  1 tablet Oral Daily     potassium chloride ER  20 mEq Oral Daily     sodium chloride (PF)  3 mL Intracatheter Q8H       Data   Recent Labs   Lab 08/24/22  0930 08/23/22  1228 08/22/22  0814 08/21/22  1800   WBC 4.1 4.7 5.6 4.2   HGB 11.1* 11.3* 11.4* 10.3*   MCV 91 89 92 92    178 149* 167   INR 2.51* 2.09* 1.68*  --     140 139 137   POTASSIUM 4.0 3.9 3.9 4.0   CHLORIDE 113* 112* 111* 108   CO2 20 20 20 22   BUN 20 18 9 9   CR 0.57 0.52 0.58 0.68   ANIONGAP 7 8 8 7   BRADLEY 8.9 9.0 9.8 9.8   * 124* 110* 120*   ALBUMIN  --   --  3.3* 3.4   PROTTOTAL  --   --  6.5* 6.1*   BILITOTAL  --   --  0.6 0.8   ALKPHOS  --   --  47 45   ALT  --   --  17 15   AST  --   --  25 29       No results found for this or any previous visit (from the past 24 hour(s)).

## 2022-08-24 NOTE — PROGRESS NOTES
Shift Summary [id number]    Admitting Diagnosis: Dehydration.  Lethargy.  Abnormal urinalysis.  Infection due to COVID 2019 novel coronavirus.    Patient A&O x2, pleasantly confused baseline dementia. VSS. Denied pain. TRISTAN BANGURA SL,  Continues on IV decadron/rocephin/remdesivir. Denied pain. Room air w/ Continuously Pulse oximetry sating @ 95%. Up /w lift in the chair, on this shift decline to be transfer back in bed. Turn/repo Q2 hrs, Mepilex in place in the coccyx date 08/22 still intact. Pure wick in place, no BM on this shift. Patient may  need help to order meal and feed/redirect to eat. No plan for discharge yet. Will continue to monitor.

## 2022-08-25 NOTE — PLAN OF CARE
Goal Outcome Evaluation:    Plan of Care Reviewed With: patient        DATE & TIME: 8/24/22 3609-4709   Cognitive Concerns/ Orientation : Pt A/Ox1, oriented to self. Pt thinks she is in her room at home. Pleasantly confused. [place]  BEHAVIOR & AGGRESSION TOOL COLOR: Green   ABNL VS/O2: VSS on RA  MOBILITY: Lift, fall risk. Reposition q2h.  PAIN MANAGMENT: Denies  DIET: Regular, total feed. Pt needs encouragement to drink liquids but will when helped.  BOWEL/BLADDER: Incontinent of urine, purewick in place, BM X1 this shift  ABNL LAB/BG: CRP 24.2 (trending down)/Hgb 11.3/INR 2.09/D-dimer 1.34  DRAIN/DEVICES: IVF SL  SKIN: Scattered bruising. Mepilex on coccyx for blanchable redness.  D/C DATE: Discharge pending mobility and tolerating PO. Pt will need TCU at discharge.  OTHER IMPORTANT INFO: PT/OT following. Lung sounds diminished. Dry, infrequent, nonproductive cough. SALAZAR. Continues on IV decadron/rocephin/remdesivir.

## 2022-08-25 NOTE — PLAN OF CARE
DATE & TIME: 8/25/22 1762-6116   Cognitive Concerns/ Orientation : Pt A/Ox1, oriented to self. Pleasantly confused. [place]  BEHAVIOR & AGGRESSION TOOL COLOR: Green   ABNL VS/O2: VSS on RA  MOBILITY: Lift, fall risk. Reposition q2h up in chair for meals, up with PT & OT today.  PAIN MANAGMENT: Denies  DIET: Regular, total feed. Pt needs encouragement to drink liquids and eat but will when helped. Poor appetite but better at end of day.  BOWEL/BLADDER: Incontinent of urine, purewick in place, adequate output, BM X1 this shift.  ABNL LAB/BG: No labs today, labs from yesterday 8/24 CRP 13.0 (trending down) Hgb 11.1, D-dimer 0.62, INR 2.90 (8/25).  DRAIN/DEVICES: IVF 0.9% ns 20MEqKCl running 75mL/hr   SKIN: Scattered bruising. Mepilex on coccyx for blanchable redness.  D/C DATE: Discharge pending mobility and tolerating PO. Pt will need TCU at discharge.   OTHER IMPORTANT INFO: PT/OT following. Lung sounds diminished. Dry, infrequent, nonproductive cough. SALAZAR. Continues on IV decadron. Remdesivir completed.

## 2022-08-25 NOTE — PROGRESS NOTES
[facility]    Hospitalist Progress Note    Brief Summary:    This is a 89-year-old female with history of multiple myeloma in remission, CHF, diastolic ascending aortic dilation, compression fractures, hypertension, moderate to severe pulmonary hypertension, paroxysmal atrial fibrillation, on chronic anticoagulation with Coumadin, hyperlipidemia, status post permanent pacemaker placement and dementia, was brought to the ED was feeling generalized weak, fatigued, tired, not eating well and found to have fever as well.    Assessment & Plan     1.  Generalized weakness secondary to COVID-19 infection:  This is a 89-year-old female with multiple comorbidities including multiple myeloma in the past, who presented with fever, chills, weakness, fatigue, nausea, vomiting, diarrhea and quite dehydrated at this time. Admit as inpatient,   Order chest x-ray, ESR, CRP reviewed, no significant infiltrate and CRP only mildly elevated, but given her comorbidities, she was at high risk for progression, so started her on dexamethasone and IV remdesivir for 3 days.  Kept her on gentle IV hydration as she having diarrhea too and eating much and looked quite dehydrated on admission.    She is much better after IV fluids and dexamethasone, remain afebrile and on room air at this time. Continue 3 days of remdesivir and dexamethasone at this time while in the hospital, likely stop both from tomorrow.  She is otherwise stable, only in hospital for weakness rather than for COVID but as per hospital policy she need to be on precautions for 10 days.   She is done with her 3 days of Remdesivir, CRP very low and afebrile will stop Dexamethasone today as well. Oral appetite remain poor will start her on maintenance fluid.       2.  Possible urinary tract infection:  The patient, although has no symptoms, but she is demented and then the UA positive for WBC of 12, mild leukocyte esterase positive.  She was started on IV  ceftriaxone started in ED, Urine culture <10,000 aerococcus sanguinicoal. Stop IV Ceftriaxone today 8/24/22    3.  History of congestive heart failure:  She is on Lasix.     4.  Hypertension:  Reasonably controlled at this time.    5.  History of paroxysmal atrial fibrillation, status post pacemaker placement:  Currently ventricular rhythm chronic anticoagulation with Coumadin.  INR is therapeutic     6.  History of multiple myeloma:  In remission.  History of compression fracture because of that  .  7.  Chronic Diastolic Heart Failure: stable, on lasix. No acute exacerbation     8.  Acquired coagulopathy secondary to coumadin.     Continue to be weak and lethargic at this time with poor oral intake  Start on Maintenance fluid  No need for dexamethasone at this time.   PT and OT to continue.       DVT Prophylaxis: Warfarin  Code Status: No CPR- Do NOT Intubate     Discussed with the nursing staff taking care of the patient today    Called patient daughter over the phone and updated her on patient status and all questions answered.           Disposition: Expected discharge will need TCU      Abdiaziz Ramirez MD, MD  Text Page  (7am - 6pm)    Interval History   Patient slightly more weaker and lethargic this morning, not wanted to eat at this time, has some mild cough.   No fever, chills, nausea, vomiting or diarrhea at this time.     No other significant event overnight.     -Data reviewed today: I reviewed all new labs and imaging results over the last 24 hours. I personally reviewed no images or EKG's today.    Physical Exam   Temp: 97.3  F (36.3  C) Temp src: Oral BP: 134/83 Pulse: 77   Resp: 18 SpO2: 96 % O2 Device: None (Room air)    Vitals:    08/23/22 0617 08/24/22 0630 08/25/22 0604   Weight: 58.6 kg (129 lb 3 oz) 61.2 kg (134 lb 14.7 oz) 60.4 kg (133 lb 2.5 oz)     Vital Signs with Ranges  Temp:  [97.3  F (36.3  C)-98  F (36.7  C)] 97.3  F (36.3  C)  Pulse:  [70-77] 77  Resp:  [16-18] 18  BP: (129-134)/(79-83)  134/83  SpO2:  [96 %-97 %] 96 %  I/O last 3 completed shifts:  In: -   Out: 1200 [Urine:1200]    Constitutional: lethargic and some what confused this morning.    Eyes: Lids and lashes normal, pupils equal, round and reactive to light, extra ocular muscles intact, sclera clear, conjunctiva normal  Respiratory: No increased work of breathing, good air exchange, clear to auscultation bilaterally, no crackles or wheezing  Cardiovascular: Normal apical impulse, regular rate and rhythm, normal S1 and S2, no S3 or S4, and no murmur noted  GI: No scars, normal bowel sounds, soft, non-distended, non-tender, no masses palpated, no hepatosplenomegally  Skin: no bruising or bleeding  Musculoskeletal: no edema.  Neurologic: no focal deficit, but pleasantly confused.     Medications     0.9% sodium chloride + KCl 20 mEq/L       - MEDICATION INSTRUCTIONS -       Warfarin Therapy Reminder         sodium chloride 0.9%  50 mL Intravenous Q24H     brimonidine  1 drop Right Eye BID     dorzolamide  1 drop Right Eye BID     furosemide  20 mg Oral Daily     latanoprost  1 drop Right Eye At Bedtime     multivitamin, therapeutic  1 tablet Oral Daily     potassium chloride ER  20 mEq Oral Daily     sodium chloride (PF)  3 mL Intracatheter Q8H       Data   Recent Labs   Lab 08/24/22  0930 08/23/22  1228 08/22/22  0814 08/21/22  1800   WBC 4.1 4.7 5.6 4.2   HGB 11.1* 11.3* 11.4* 10.3*   MCV 91 89 92 92    178 149* 167   INR 2.51* 2.09* 1.68*  --     140 139 137   POTASSIUM 4.0 3.9 3.9 4.0   CHLORIDE 113* 112* 111* 108   CO2 20 20 20 22   BUN 20 18 9 9   CR 0.57 0.52 0.58 0.68   ANIONGAP 7 8 8 7   BRADLEY 8.9 9.0 9.8 9.8   * 124* 110* 120*   ALBUMIN  --   --  3.3* 3.4   PROTTOTAL  --   --  6.5* 6.1*   BILITOTAL  --   --  0.6 0.8   ALKPHOS  --   --  47 45   ALT  --   --  17 15   AST  --   --  25 29       No results found for this or any previous visit (from the past 24 hour(s)).

## 2022-08-25 NOTE — CONSULTS
Care Management Initial Consult    General Information  Assessment completed with: Children, Yesi and Eufemia  Type of CM/SW Visit: Initial Assessment    Primary Care Provider verified and updated as needed: Yes   Readmission within the last 30 days: no previous admission in last 30 days      Reason for Consult: discharge planning  Advance Care Planning:            Communication Assessment  Patient's communication style: spoken language (English or Bilingual)    Hearing Difficulty or Deaf: yes   Wear Glasses or Blind: no    Cognitive  Cognitive/Neuro/Behavioral: .WDL except, orientation  Level of Consciousness: alert, confused  Arousal Level: opens eyes spontaneously  Orientation: disoriented to, place, time, situation  Mood/Behavior: calm, cooperative  Best Language: 0 - No aphasia  Speech: slow, spontaneous    Living Environment:   People in home: child(ezra), adult     Current living Arrangements: house      Able to return to prior arrangements: no       Family/Social Support:  Care provided by: child(ezra), homecare agency  Provides care for: no one  Marital Status:   Children          Description of Support System: Supportive    Support Assessment: Adequate family and caregiver support    Current Resources:   Patient receiving home care services: Yes  Skilled Home Care Services: Home Health Aid  Community Resources: Home Care  Equipment currently used at home: walker, rolling, wheelchair, manual, shower chair  Supplies currently used at home:      Employment/Financial:  Employment Status: retired        Financial Concerns:             Lifestyle & Psychosocial Needs:  Social Determinants of Health     Tobacco Use: Low Risk      Smoking Tobacco Use: Never Smoker     Smokeless Tobacco Use: Never Used   Alcohol Use: Not on file   Financial Resource Strain: Not on file   Food Insecurity: Not on file   Transportation Needs: Not on file   Physical Activity: Not on file   Stress: Not on file   Social Connections:  Not on file   Intimate Partner Violence: Not on file   Depression: Not at risk     PHQ-2 Score: 0   Housing Stability: Not on file       Functional Status:  Prior to admission patient needed assistance:   Dependent ADLs:: Ambulation-walker, Grooming, Dressing, Incontinence, Wheelchair-with assist  Dependent IADLs:: Shopping, Meal Preparation, Medication Management, Transportation       Mental Health Status:  Mental Health Status: No Current Concerns       Chemical Dependency Status:  Chemical Dependency Status: No Current Concerns             Values/Beliefs:  Spiritual, Cultural Beliefs, Orthodoxy Practices, Values that affect care: no               Additional Information:  Plan of care and discharge plans discussed with patients daughter Eufemia and Yesi.  Patient lives in a house with son and daughter Yesi lives [place]. Patient has home health aide for 3-4 hrs /day.   Yesi stating patient has declined  significantly over the last month that it is requiring more than  1 person to assist. Patient has h/o dementia but very pleasant.Discussed therapy recommendation to TCU at this time. Family in agreement  and aware that patient will remain hospitalized till considered COVID recovered.     CM will need to connect with family regarding choices for TCU.  Patient will need to continue PT while in hospital to improve gait and mobility.     Flip Lambert RN CC

## 2022-08-25 NOTE — PLAN OF CARE
Goal Outcome Evaluation:    A&O to self, pleasantly confused. VSS on Rm Air. Continuous pulse ox. Sats 97% on Rm Air. Infrequent dry cough. Denies SOB. LS clear, diminished, course crackles to B/L bases. Asstx2 w/ lift. Turn & repo q2 hrs. No BM during shift. Purewick in place. Mepilex CDI to coccyx; pink blanchable intact skin noted. PIV to LFA; intermittent abx infusion. Feeding assistance needed. Regular diet; poor appetite. Thin liquids. PO meds crushed w/ applesauce. Discharge pending.

## 2022-08-26 NOTE — PLAN OF CARE
Goal Outcome Evaluation:        Cognitive Concerns/ Orientation : Pt A/Ox1, oriented to self. Pleasantly confused. [place]  BEHAVIOR & AGGRESSION TOOL COLOR: Green   ABNL VS/O2: VSS on RA  MOBILITY: Lift, fall risk. Reposition q2h   PAIN MANAGMENT: Denies  DIET: Regular, total feed. Pt needs encouragement to drink liquids and eat but will when helped. Poor appetite.  BOWEL/BLADDER: Incontinent of urine, purewick in place,   ABNL LAB/BG: No labs today, labs from yesterday 8/24 CRP 13.0 (trending down)/Hgb 11.1, D-dimer 0.62, INR 2.90 (8/25).  DRAIN/DEVICES: IVF  SKIN: Scattered bruising. Mepilex on coccyx for blanchable redness.  D/C DATE: Discharge pending mobility and tolerating PO. Pt will need TCU at discharge.  OTHER IMPORTANT INFO: PT/OT following. Lung sounds diminished. Dry, infrequent, nonproductive cough. SALAZAR. Possible discharge home  with hospice tomorrow.Pt refuse reposition .Sitting in the chair most of the shift.Refuse Iv line .MD aware.

## 2022-08-26 NOTE — PROGRESS NOTES
[facility] Received Community referral. Spoke with Daughter Eufemia and family is wanting to take pt home as soon as possible hoping for tomorrow  transport.     They are hoping to be able to be on Hospice Services. Awaiting Approval from Hospice Medical provider.     Pt lives with her son and has care takers M-F. They already have home care equipment I.e. hospital bed/commode    Please don't hesitate to call for updates    3:06 PM- Awaiting to get more background information on pt declining status in regards to weight loss to help determine eligibility as well as new albumin level.    Zenia Meza RN BSN  Clinical Hospice Nurse Liaison   Contact Cell [phone]

## 2022-08-26 NOTE — PROGRESS NOTES
[facility]    Hospitalist Progress Note    Brief Summary:    This is a 89-year-old female with history of multiple myeloma in remission, CHF, diastolic ascending aortic dilation, compression fractures, hypertension, moderate to severe pulmonary hypertension, paroxysmal atrial fibrillation, on chronic anticoagulation with Coumadin, hyperlipidemia, status post permanent pacemaker placement and dementia, was brought to the ED was feeling generalized weak, fatigued, tired, not eating well and found to have fever as well.    Assessment & Plan     1.  Generalized weakness secondary to COVID-19 infection:  This is a 89-year-old female with multiple comorbidities including multiple myeloma in the past, who presented with fever, chills, weakness, fatigue, nausea, vomiting, diarrhea and quite dehydrated at this time. Admit as inpatient,   Order chest x-ray, ESR, CRP reviewed, no significant infiltrate and CRP only mildly elevated, but given her comorbidities, she was at high risk for progression, so started her on dexamethasone and IV remdesivir for 3 days.  Kept her on gentle IV hydration as she having diarrhea too and eating much and looked quite dehydrated on admission.    She is much better after IV fluids and dexamethasone, remain afebrile and on room air at this time. Continue 3 days of remdesivir and dexamethasone at this time while in the hospital, likely stop both from tomorrow.  She is otherwise stable, only in hospital for weakness rather than for COVID but as per hospital policy she need to be on precautions for 10 days.   She is done with her 3 days of Remdesivir, CRP very low and afebrile  stop Dexamethasone 8/25 as well.   Much more awake and alert today, continue to with IV fluids but cut down to 50 ml/hr as oral intake improve will likely stop if continue to do better.       2.  Possible urinary tract infection:  The patient, although has no symptoms, but she is demented and then the  UA positive for WBC of 12, mild leukocyte esterase positive.  She was started on IV ceftriaxone started in ED, Urine culture <10,000 aerococcus sanguinicoal. Stop IV Ceftriaxone today 8/24/22    3.  History of congestive heart failure:  She is on Lasix.     4.  Hypertension:  Reasonably controlled at this time.    5.  History of paroxysmal atrial fibrillation, status post pacemaker placement:  Currently ventricular rhythm chronic anticoagulation with Coumadin.  INR is therapeutic     6.  History of multiple myeloma:  In remission.  History of compression fracture because of that  .  7.  Chronic Diastolic Heart Failure: stable, on lasix. No acute exacerbation     8.  Acquired coagulopathy secondary to coumadin.     Overall remain stable   Awaiting placement.       DVT Prophylaxis: Warfarin  Code Status: No CPR- Do NOT Intubate       Disposition: Expected discharge will need TCU      Abdiaziz Ramirez MD, MD  Text Page  (7am - 6pm)    Interval History   Patient awake, alert and sitting in chair and eating, remain pleasantly confused given underlying dementia, denies any pain or SOB at this time .    No other significant event overnight.     -Data reviewed today: I reviewed all new labs and imaging results over the last 24 hours. I personally reviewed no images or EKG's today.    Physical Exam   Temp: 97.8  F (36.6  C) Temp src: Axillary BP: 132/74 Pulse: 70   Resp: 18 SpO2: 96 % O2 Device: None (Room air)    Vitals:    08/23/22 0617 08/24/22 0630 08/25/22 0604   Weight: 58.6 kg (129 lb 3 oz) 61.2 kg (134 lb 14.7 oz) 60.4 kg (133 lb 2.5 oz)     Vital Signs with Ranges  Temp:  [97.3  F (36.3  C)-97.8  F (36.6  C)] 97.8  F (36.6  C)  Pulse:  [69-73] 70  Resp:  [16-18] 18  BP: (116-132)/(68-80) 132/74  SpO2:  [96 %-98 %] 96 %  I/O last 3 completed shifts:  In: -   Out: 800 [Urine:800]    Constitutional: awake, alert, in no distress.     Eyes: Lids and lashes normal, pupils equal, round and reactive to light, extra ocular  muscles intact, sclera clear, conjunctiva normal  Respiratory: No increased work of breathing, good air exchange, clear to auscultation bilaterally, no crackles or wheezing  Cardiovascular: Normal apical impulse, regular rate and rhythm, normal S1 and S2, no S3 or S4, and no murmur noted  GI: No scars, normal bowel sounds, soft, non-distended, non-tender, no masses palpated, no hepatosplenomegally  Skin: no bruising or bleeding  Musculoskeletal: no edema.  Neurologic: no focal deficit, but pleasantly confused.     Medications     0.9% sodium chloride + KCl 20 mEq/L 75 mL/hr at 08/26/22 0900     - MEDICATION INSTRUCTIONS -       Warfarin Therapy Reminder         brimonidine  1 drop Right Eye BID     dorzolamide  1 drop Right Eye BID     furosemide  20 mg Oral Daily     latanoprost  1 drop Right Eye At Bedtime     multivitamin, therapeutic  1 tablet Oral Daily     potassium chloride ER  20 mEq Oral Daily     sodium chloride (PF)  3 mL Intracatheter Q8H     warfarin ANTICOAGULANT  2 mg Oral ONCE at 18:00       Data   Recent Labs   Lab 08/26/22  0927 08/25/22  1139 08/24/22  0930 08/23/22  1228 08/22/22  0814 08/21/22  1800 08/21/22  1800   WBC  --   --  4.1 4.7 5.6  --  4.2   HGB  --   --  11.1* 11.3* 11.4*  --  10.3*   MCV  --   --  91 89 92  --  92   PLT  --   --  186 178 149*  --  167   INR 2.91* 2.90* 2.51* 2.09* 1.68*   < >  --    NA  --   --  140 140 139  --  137   POTASSIUM  --   --  4.0 3.9 3.9  --  4.0   CHLORIDE  --   --  113* 112* 111*  --  108   CO2  --   --  20 20 20  --  22   BUN  --   --  20 18 9  --  9   CR  --   --  0.57 0.52 0.58  --  0.68   ANIONGAP  --   --  7 8 8  --  7   BRADLEY  --   --  8.9 9.0 9.8  --  9.8   GLC  --   --  109* 124* 110*  --  120*   ALBUMIN  --   --   --   --  3.3*  --  3.4   PROTTOTAL  --   --   --   --  6.5*  --  6.1*   BILITOTAL  --   --   --   --  0.6  --  0.8   ALKPHOS  --   --   --   --  47  --  45   ALT  --   --   --   --  17  --  15   AST  --   --   --   --  25  --  29    <  > = values in this interval not displayed.       No results found for this or any previous visit (from the past 24 hour(s)).

## 2022-08-26 NOTE — PLAN OF CARE
Goal Outcome Evaluation:      DATE & TIME: 8/25/22-8/26/22 1107-4282   Cognitive Concerns/ Orientation : Pt A/Ox1, oriented to self. Pleasantly confused. [place]  BEHAVIOR & AGGRESSION TOOL COLOR: Green   ABNL VS/O2: VSS on RA  MOBILITY: Lift, fall risk. Reposition q2h   PAIN MANAGMENT: Denies  DIET: Regular, total feed. Pt needs encouragement to drink liquids and eat but will when helped. Poor appetite.  BOWEL/BLADDER: Incontinent of urine, purewick in place, adequate output BM X1 this shift  ABNL LAB/BG: No labs today, labs from yesterday 8/24 CRP 13.0 (trending down)/Hgb 11.1, D-dimer 0.62, INR 2.90 (8/25).  DRAIN/DEVICES: IVF infusing @ 75ml/hr  SKIN: Scattered bruising. Mepilex on coccyx for blanchable redness.  D/C DATE: Discharge pending mobility and tolerating PO. Pt will need TCU at discharge.  OTHER IMPORTANT INFO: PT/OT following. Lung sounds diminished. Dry, infrequent, nonproductive cough. SALAZAR. Continues on IV decadron. Remdesivir completed.

## 2022-08-27 NOTE — DISCHARGE SUMMARY
[facility]    Discharge Summary  Hospitalist    Date of Admission:  8/21/2022  Date of Discharge:  8/27/2022  Discharging Provider: Abdiaziz Ramirez MD, MD  Date of Service (when I saw the patient): 08/27/22    Discharge Diagnoses   Please refer below     History of Present Illness   Amira Arreola is an 89 year old female who presented with weakness.     Hospital Course     This is a 89-year-old female with history of multiple myeloma in remission, CHF, diastolic ascending aortic dilation, compression fractures, hypertension, moderate to severe pulmonary hypertension, paroxysmal atrial fibrillation, on chronic anticoagulation with Coumadin, hyperlipidemia, status post permanent pacemaker placement and dementia, was brought to the ED was feeling generalized weak, fatigued, tired, not eating well and found to have fever as well.        Final Discharge diagnosis and Hospital Course.       1.  Generalized weakness secondary to COVID-19 infection:  This is a 89-year-old female with multiple comorbidities including multiple myeloma in the past, who presented with fever, chills, weakness, fatigue, nausea, vomiting, diarrhea and quite dehydrated at this time. Admit as inpatient,   Order chest x-ray, ESR, CRP reviewed, no significant infiltrate and CRP only mildly elevated, but given her comorbidities, she was at high risk for progression, so started her on dexamethasone and IV remdesivir for 3 days.  Kept her on gentle IV hydration as she having diarrhea too and eating much and looked quite dehydrated on admission.    She is much better after IV fluids and dexamethasone, remain afebrile and on room air at this time. Treated with  3 days of remdesivir and dexamethasone at this time while in the hospital and now discontinued.   She is otherwise stable, only in hospital for weakness rather than for COVID but as per hospital policy she need to be on precautions for 10 days.   She is done with her 3  days of Remdesivir, CRP very low and afebrile  stop Dexamethasone 8/25 as well.   Much more awake and alert at this time.     Family was in discussion with her PCP about hospice even before this admission and was eventually saw by hospice and patient family has decided to take her back home with home hospice. She will be discharge home in stable condition and hospice will follow with her as out patient.         2.  Possible urinary tract infection:  The patient, although has no symptoms, but she is demented and then the UA positive for WBC of 12, mild leukocyte esterase positive.  She was started on IV ceftriaxone started in ED, Urine culture <10,000 aerococcus sanguinicoal. Stop IV Ceftriaxone today 8/24/22     3.  History of congestive heart failure:  She is on Lasix.      4.  Hypertension:  Reasonably controlled at this time.     5.  History of paroxysmal atrial fibrillation, status post pacemaker placement:  Currently ventricular rhythm chronic anticoagulation with Coumadin.  INR is therapeutic      6.  History of multiple myeloma:  In remission.  History of compression fracture because of that  .  7.  Chronic Diastolic Heart Failure: stable, on lasix. No acute exacerbation      8.  Acquired coagulopathy secondary to coumadin.      Discharge home with home hospice at this time.        Code Status: No CPR- Do NOT Intubate     Abdiaziz Ramirez MD, MD    Significant Results and Procedures       Pending Results   These results will be followed up by PCP  Unresulted Labs Ordered in the Past 30 Days of this Admission     No orders found from 7/22/2022 to 8/22/2022.          Code Status   DNR / DNI       Primary Care Physician   Addy Frias    Physical Exam   Temp: 97.3  F (36.3  C) Temp src: Oral BP: (!) 142/70 Pulse: 71   Resp: 18 SpO2: 96 % O2 Device: None (Room air)    Vitals:    08/23/22 0617 08/24/22 0630 08/25/22 0604   Weight: 58.6 kg (129 lb 3 oz) 61.2 kg (134 lb 14.7 oz) 60.4 kg (133 lb 2.5 oz)     Vital Signs  with Ranges  Temp:  [97.3  F (36.3  C)-98.1  F (36.7  C)] 97.3  F (36.3  C)  Pulse:  [69-71] 71  Resp:  [16-18] 18  BP: (135-142)/(70-83) 142/70  SpO2:  [95 %-97 %] 96 %  I/O last 3 completed shifts:  In: 260 [P.O.:260]  Out: 300 [Urine:300]    Constitutional: awake, alert, cooperative, no apparent distress, and appears stated age  Respiratory: No increased work of breathing, good air exchange, clear to auscultation bilaterally, no crackles or wheezing  Cardiovascular: Normal apical impulse, regular rate and rhythm, normal S1 and S2, no S3 or S4, and no murmur noted  Musculoskeletal: no lower extremity pitting edema present  Neurologic: no focal deficit.     Discharge Disposition   Discharged to home  Condition at discharge: Stable    Consultations This Hospital Stay   PHARMACY TO DOSE WARFARIN  CARE MANAGEMENT / SOCIAL WORK IP CONSULT  PHYSICAL THERAPY ADULT IP CONSULT  OCCUPATIONAL THERAPY ADULT IP CONSULT  CARE MANAGEMENT / SOCIAL WORK IP CONSULT    Time Spent on this Encounter   IAbdiaziz MD, personally saw the patient today and spent greater than 30 minutes discharging this patient.    Discharge Orders      Primary Care - Care Coordination Referral      Reason for your hospital stay    Generalized weakness secondary to COVID now improve.     Follow-up and recommended labs and tests     Follow up with primary care provider, Addy Frias, within 7 days for hospital follow- up.  No follow up labs or test are needed.     Activity    Your activity upon discharge: activity as tolerated     Diet    Follow this diet upon discharge: Orders Placed This Encounter      Snacks/Supplements Adult: Other; [place] vanilla + 1% milk; With Meals      Combination Diet Regular Diet Adult     Discharge Medications   Current Discharge Medication List      CONTINUE these medications which have NOT CHANGED    Details   brimonidine (ALPHAGAN) 0.2 % ophthalmic solution Place 1 drop into the right eye 2 times daily       dorzolamide (TRUSOPT) 2 % ophthalmic solution Place 1 drop into the right eye 2 times daily      furosemide (LASIX) 20 MG tablet Take 3 (60mg) tablets daily, if weight > 137 pounds take an additional 20mg (1 tablet)  Qty: 270 tablet, Refills: 1    Associated Diagnoses: Chronic diastolic heart failure (H)      HYDROcodone-acetaminophen (NORCO) 5-325 MG tablet Take 0.5-1 tablets by mouth every 6 hours as needed for severe pain      latanoprost (XALATAN) 0.005 % ophthalmic solution Place 1 drop into the right eye At Bedtime      potassium chloride ER (KLOR-CON M) 20 MEQ CR tablet Take 1 tablet 2 x a day, with an additional 1 tablet on days when you take extra fursoemide  Qty: 200 tablet, Refills: 0    Associated Diagnoses: Chronic diastolic heart failure (H)      warfarin ANTICOAGULANT (COUMADIN) 4 MG tablet TAKE 1 tablet ( 4 mg) BY MOUTH every Tue, Thu, Sat; and take 1/2 tablet (2 mg) all other days of the week or as directed by your ACC Team.  Qty: 90 tablet, Refills: 1    Associated Diagnoses: Atrial fibrillation, unspecified type (H)      acetaminophen (TYLENOL) 500 MG tablet Take 500-1,000 mg by mouth every 6 hours as needed for mild pain      Carboxymethylcellulose Sod PF (REFRESH PLUS) 0.5 % SOLN ophthalmic solution 1 drop 4 times daily as needed for dry eyes      Multiple Vitamins-Minerals (DAILY MULTIVITAMIN) CAPS Take 1 tablet by mouth daily     Associated Diagnoses: Routine general medical examination at a health care facility      order for DME Equipment being ordered: Nebulizer with tubes/mask/acessories, use as directed  Qty: 1 Device, Refills: 0    Associated Diagnoses: Wheezing           Allergies   Allergies   Allergen Reactions     Blood Transfusion Related (Informational Only)      Blood antigens related to receiving Darzelex-AG     Penicillin [Penicillins] Rash     Blotches on chest      Data   Most Recent 3 CBC's:Recent Labs   Lab Test 08/24/22  0930 08/23/22  1228 08/22/22  0814   WBC 4.1 4.7  5.6   HGB 11.1* 11.3* 11.4*   MCV 91 89 92    178 149*      Most Recent 3 BMP's:  Recent Labs   Lab Test 08/27/22  0929 08/24/22  0930 08/23/22  1228 08/22/22  0814   NA  --  140 140 139   POTASSIUM 3.6 4.0 3.9 3.9   CHLORIDE  --  113* 112* 111*   CO2  --  20 20 20   BUN  --  20 18 9   CR  --  0.57 0.52 0.58   ANIONGAP  --  7 8 8   BRADLEY  --  8.9 9.0 9.8   GLC  --  109* 124* 110*     Most Recent 2 LFT's:  Recent Labs   Lab Test 08/22/22  0814 08/21/22  1800   AST 25 29   ALT 17 15   ALKPHOS 47 45   BILITOTAL 0.6 0.8     Most Recent INR's and Anticoagulation Dosing History:  Anticoagulation Dose History     Recent Dosing and Labs Latest Ref Rng & Units 8/18/2022 8/22/2022 8/23/2022 8/24/2022 8/25/2022 8/26/2022 8/27/2022    Warfarin 2 mg - - - - 2 mg 2 mg 2 mg -    Warfarin 4 mg - - 4 mg 4 mg - - - -    INR 0.85 - 1.15 3.3(A) 1.68(H) 2.09(H) 2.51(H) 2.90(H) 2.91(H) 2.43(H)        Most Recent 3 Troponin's:  Recent Labs   Lab Test 07/03/19  1932 07/03/19  1319 07/03/19  0925   TROPI <0.015 <0.015 <0.015     Most Recent Cholesterol Panel:  Recent Labs   Lab Test 10/12/16  0839   CHOL 160   LDL 71   HDL 76   TRIG 66     Most Recent 6 Bacteria Isolates From Any Culture (See EPIC Reports for Culture Details):  Recent Labs   Lab Test 07/05/19  1120 07/03/19  1009 07/03/19  0924 11/13/17  1105   CULT No growth No growth No growth No growth     Most Recent TSH, T4 and A1c Labs:  Recent Labs   Lab Test 07/01/22  1247   TSH 1.68     Results for orders placed or performed during the hospital encounter of 08/21/22   XR Chest Port 1 View    Narrative    EXAM: XR CHEST PORT 1 VIEW  LOCATION: [facility]  DATE/TIME: 8/21/2022 8:08 PM    INDICATION: generalized weakness  COMPARISON: 7/6/2019      Impression    IMPRESSION: Change in positioning patient rotated somewhat into an [place] position as well as being more kyphotic in position. Mild cardiomegaly unchanged with single lead pacemaker. Small left  pleural effusion stable. Mild increase in interstitial   prominence diffusely suggesting minimal interstitial edema but no new focal pneumonia evident. Right-sided Port-A-Cath in stable position.     *Note: Due to a large number of results and/or encounters for the requested time period, some results have not been displayed. A complete set of results can be found in Results Review.     Most Recent 3 CBC's:Recent Labs   Lab Test 08/24/22  0930 08/23/22  1228 08/22/22  0814   WBC 4.1 4.7 5.6   HGB 11.1* 11.3* 11.4*   MCV 91 89 92    178 149*     Most Recent 3 BMP's:Recent Labs   Lab Test 08/27/22  0929 08/24/22  0930 08/23/22  1228 08/22/22  0814   NA  --  140 140 139   POTASSIUM 3.6 4.0 3.9 3.9   CHLORIDE  --  113* 112* 111*   CO2  --  20 20 20   BUN  --  20 18 9   CR  --  0.57 0.52 0.58   ANIONGAP  --  7 8 8   BRADLEY  --  8.9 9.0 9.8   GLC  --  109* 124* 110*     Most Recent 2 LFT's:Recent Labs   Lab Test 08/22/22  0814 08/21/22  1800   AST 25 29   ALT 17 15   ALKPHOS 47 45   BILITOTAL 0.6 0.8

## 2022-08-27 NOTE — CONSULTS
Writer spoke to Fernando Ramírez to arrange for hospice admission in the home with [facility] hospice. Patient will be discharging from the hospital to home today, 8/27/2022. LUIGI Sifuentes has arranged for stretcher transport at 1 pm today to home in [place], [place].  [facility] would be able to admit patient into hospice on Wednesday August 31st, TBD. [facility] hospice will call fernando Dacosta [phone] on Monday morning to schedule a time for the admission on Wednesday. Updated daughterDesiree. Family is in agreement with hospice admission on Wednesday. Please call [facility] hospice with any questions or updates to this discharge plan. Updated LUIGI Sifuentes.    Thank you for this referral,    Sammie Brandt, RN, BSN, PHN   RN Hospice Clinical Liaison  [facility]   [phone]   24 hour line: [phone]

## 2022-08-27 NOTE — PROGRESS NOTES
Discharge    Patient discharged to home via stretcher with HE.  Care plan note: Patient had VSS on RA, denied chest pain and SOB. Patient ate most of breakfast, had to be fed. Patient received a full bed bath. Patient is being discharged to home with the care of family and outpatient hospice. Stretcher ride scheduled for 1300. Family updated by MD and LUIGI.     Listed belongings gathered and given to patient (including from security/pharmacy). Yes  Care Plan and Patient education resolved: Yes  Prescriptions if needed, hard copies sent with patient  NA  Medication Bin checked and emptied on discharge Yes  LUIGI/care coordinator/charge RN aware of discharge: Yes

## 2022-08-27 NOTE — PLAN OF CARE
Goal Outcome Evaluation:    Plan of Care Reviewed With: patient     Overall Patient Progress: no change     SUMMARY: Dehydration, Covid positive    DATE & TIME: 8/26-08/27 2986-2251   Cognitive Concerns/ Orientation : A/Ox1, oriented to self. Pleasantly confused. [place]  BEHAVIOR & AGGRESSION TOOL COLOR: Green  ABNL VS/O2: VSS on RA  MOBILITY: Lift, fall risk. T&R   PAIN MANAGMENT: Denies  DIET: Regular, total feed. Pt needs encouragement to drink liquids and eat but will when helped. Poor appetite.  BOWEL/BLADDER: Incontinent, purewick in place, No BM this shift   ABNL LAB/BG: No labs today, labs  8/24 CRP 13.0 (trending down) Hgb 11.1, D-dimer 0.62, INR 2.91.  DRAIN/DEVICES: No IV access, MD aware. Perm pacemaker   SKIN: Scattered bruising. Mepilex on coccyx for blanchable redness.  D/C DATE: Discharge pending mobility and tolerating PO. Pt will need TCU at discharge.  OTHER IMPORTANT INFO: PT/OT following. Lung sounds diminished. Dry, infrequent, nonproductive cough. SALAZAR. Possible discharge home with hospice tomorrow.

## 2022-08-27 NOTE — PROGRESS NOTES
Care Management Discharge Note    Discharge Date: 08/27/2022       Discharge Disposition: Skilled Nursing Facility    Discharge Services:      Discharge DME:      Discharge Transportation: health plan transportation    Private pay costs discussed: transportation costs    PAS Confirmation Code:    Patient/family educated on Medicare website which has current facility and service quality ratings:      Education Provided on the Discharge Plan:yes    Persons Notified of Discharge Plans: daughter Desiree and [facility] w/e liaison  Patient/Family in Agreement with the Plan: yes    Handoff Referral Completed: Yes    Additional Information:  Josh Goins requesting discharge home today.  She explains they have all the necessary DME and caregivers lined up to care for patient.  [facility] BLS has been arranged for 1300. BLS is necessary as patient is extremely weak, unable to safely sit in a chair and only oriented to self, thus requiring monitoring for her safety. PCS and face sheet have been to [facility].   [facility] Liaison is determining the day and time that hospice staff will make their home visit to enroll patient and the liaison will call dtr Desiree to update her.        MOHIT LackeySW

## 2022-08-27 NOTE — CONSULTS
[facility]    Consult Note - [facility]    ______________________________________________________________________    [facility] 24/7 Contact Number: [phone]    ______________________________________________________________________        Amira is currently ineligible for inpatient hospice.   She is Eligible for [facility]    Rationale: Current Symptoms managed planning to discharge tomorrow or next day?    Ineligibility Status Discussed with the Following:   - Nurse: Damaris/ Flip  - Hospitalist/Rounding Provider: [facility]/Preferred Contact: Elise  - Hospice Provider: Dr. Adler  - Hospice Social Worker: Venice HILLIARD    Is Aimra eligible for hospice on Discharge? Yes, [facility] hospice will continue to follow Amira throughout the hospital stay.     [facility]. No need to hold up discharge transportation at this time, Hospice can follow up when she is at home.     Zenia Meza RN  Clinical Hospice Nurse Liaison

## 2022-08-29 NOTE — TELEPHONE ENCOUNTER
ANTICOAGULATION  MANAGEMENT: Discharge Review    Amira Arreola chart reviewed for anticoagulation continuity of care    Hospital Admission on 08/21/22-08/27/22 for weakness 2/2 COVID infection.     Discharge disposition: Discharging with [facility] hospice    Results:    Recent labs: (last 7 days)     08/23/22  1228 08/24/22  0930 08/25/22  1139 08/26/22  0927 08/27/22  1128   INR 2.09* 2.51* 2.90* 2.91* 2.43*     Anticoagulation inpatient management:     more warfarin administered than maintenance regimen     Anticoagulation discharge instructions:     Warfarin dosing: home regimen continued   Bridging: No   INR goal change: No      Medication changes affecting anticoagulation: No    Additional factors affecting anticoagulation: No     PLAN     OK to continue current maintenance dose of warfarin until hospice intake on 08/31.    Spoke with Bran with [facility] hospice. Gave order for INR recheck on 08/31.    Anticoagulation Calendar updated    Ernst Carlson RN

## 2022-08-29 NOTE — PROGRESS NOTES
[facility]    Background: Transitional Care Management program auto-identified and prompting a chart review by [facility] team.    Assessment: Upon chart review, [facility] Team member will cancel/close this episode of Transitional Care Management program due to reason below:    Patient has been discharged with Hospice Care     Plan: Transitional Care Management episode closed per reason above.      Libby Matos  Community Health Worker  [facility]  Ph: [phone]    *Connected Care Resource Team does NOT follow patient ongoing. Referrals are identified based on internal discharge reports and the outreach is to ensure patient has an understanding of their discharge instructions.

## 2022-08-29 NOTE — PROGRESS NOTES
Occupational Therapy Discharge Summary    Reason for therapy discharge:    Discharged to home on hospice    Progress towards therapy goal(s). See goals on Care Plan in [facility] electronic health record for goal details.  Goals partially met.  Barriers to achieving goals:   limited tolerance for therapy.    Therapy recommendation(s):    No further needs

## 2022-08-31 NOTE — CARE PLAN
Physical Therapy Discharge Summary    Reason for therapy discharge:    Discharged to home with hospice services.     Progress towards therapy goal(s). See goals on Care Plan in Epic electronic health record for goal details.  Goals not met.  Barriers to achieving goals:   discharge from facility.    Therapy recommendation(s):    TCU was recommended, however pt d/c home with home hospice. A x 2 recommended for all cares and mobilitly, lift and WC also recommended for mobility.

## 2022-09-01 NOTE — PROGRESS NOTES
Catrina called from [facility] hospice to inform [facility] the patient is continuing to take warfarin and it has not been discontinued.  Hospice will continue to check the patient's INR and call in results.    IVANNA Carpenter, RN  Anticoagulation Clinic

## 2022-09-01 NOTE — PROGRESS NOTES
ANTICOAGULATION     Amira Arreola is overdue for INR check.      Spoke with Catrina at  hospice, she reports that no  has been assigned yet so she will send encounter to nurses and have someone call back. IF calling back please determine if INR was checked yesterday or if warfarin has been discontinued now that patient is on hospice.    Suyapa Walton RN

## 2022-09-06 NOTE — PROGRESS NOTES
Amira is a 89 year old who is being evaluated via a billable video visit.      How would you like to obtain your AVS? MyChart  If the video visit is dropped, the invitation should be resent by: Text to cell phone: [phone]  Will anyone else be joining your video visit? Yes: Son. How would they like to receive their invitation? Text to cell phone: [phone]      The video only worked for part of the visit.      Subjective   Amira is a 89 year old presenting for the following health issues:  Follow Up    This is a hospital follow up. She was hospitalized from 8/21 to 8/27/2022 due to weakness, covid, uti, failure to thrive.  She was discharged to home with hospice. The patient is now on hospice.  The patient is at home and 2 kids presents.  Pain well controlled x when moving her from lying to sitting, change her in bed or any twisting.  Using vicodin 1/2 at bedtime and tylenol during the day.  Somewhat conversant and clear but not always.  Does remember her kids names, sometimes not aware of where she is.  Does not remember other things, names/words but can communicate to tell you her needs.  She is not having breathing issues or edema.  She does take in some food but not a lot.  Has some issues with clearing her throat/phlegm in the morning.  No gi c/o.      I discussed things with the son and daughter.  We discussed goals of care and clearly she is on hospice now.  I have suggested that it would be fine to stop both the Lasix and potassium as she is really not eating very much and I doubt she will run into fluid overload but if they did not find that were the case we could always resume it.  We also discussed the use of warfarin and the potential risk of clots and/or strokes and at this point time he seemed more comfortable continuing it which I think is reasonable.  We discussed the use of vitamins and vitamin D and I have suggested that he could stop it at this time.  She is using minimal pain  medications and hospice is on board and can titrate that as needed.  Overall she appears to be stable.    ASSESSMENT:  Hospice visit  covid  chf  afib  Myeloma    PLAN:  Follow up care via hospice    32 minutes on the day of the encounter doing chart review, history and exam, documentation and further activities as noted above.      Vitals:  No vitals were obtained today due to virtual visit.      Video-Visit Details    Video Start Time: 1:57 PM    Type of service:  Video Visit    Video End Time:2:19 PM    Originating Location (pt. Location): Home    Distant Location (provider location):  [facility]     Platform used for Video Visit: Kylah    [unfilled]  [unfilled]

## 2022-09-08 NOTE — PROGRESS NOTES
ANTICOAGULATION     Amira Arreola is overdue for INR check.      Spokew fernanda cox at [facility], [phone], who sent message to nurse CM for patient to give [facility] a call to discuss warfarin management. per virtual visit, patient to continue warfarin while on hospice.   If calling back, need to determine if in house hospice provider is dosing INR or when INR is scheduled for next.    Suyapa Walton RN

## 2022-09-15 ENCOUNTER — DOCUMENTATION ONLY (OUTPATIENT)
Dept: FAMILY MEDICINE | Facility: CLINIC | Age: 87
End: 2022-09-15

## 2022-09-15 DIAGNOSIS — I48.0 PAROXYSMAL ATRIAL FIBRILLATION (H): ICD-10-CM

## 2022-09-15 DIAGNOSIS — Z79.01 LONG TERM CURRENT USE OF ANTICOAGULANT THERAPY: Primary | ICD-10-CM

## 2022-09-15 NOTE — PROGRESS NOTES
Received a call back from Hospice/Catrina; Patient passed away 9/12 at home.  [facility] Chart updated.  Tameka Perez, RN  Anticoagulation Nurse - [facility] [place]

## 2022-09-15 NOTE — PROGRESS NOTES
Spoke with Catrina/Hospice/[facility] again. She will place another call to nursing staff and supervisor regarding who is Managing INRs for patient.  Will await a call back.  Tameka Perez RN  Anticoagulation Nurse - [facility]

## 2022-09-15 NOTE — PROGRESS NOTES
Patient passed away on 9/12/22 at home with hospice.  [facility] encounter updated.  Tameka Perez, RN  Anticoagulation Nurse - [facility] [place]

## 2022-11-11 NOTE — PROGRESS NOTES
Can you please dose this patients coumadin and let the patient know?    Thanks    Addy Frias M.D.  
Infusion Nursing Note:  Amira Arreola presents today for D15 C11 Velcade.    Patient seen by provider today: Yes:    present during visit today: Not Applicable.    Note: No concerns or changes to report only continued discomfort left rib cage after fall few months ago.Pt. Took the Decadron and Acyclovir as prescribed today.    Intravenous Access:  Labs drawn without difficulty.  Implanted Port.    Treatment Conditions:  Lab Results   Component Value Date    HGB 11.6 07/18/2018     Lab Results   Component Value Date    WBC 6.1 07/18/2018      Lab Results   Component Value Date    ANEU 5.1 07/18/2018     Lab Results   Component Value Date     07/18/2018      Results reviewed, labs MET treatment parameters, ok to proceed with treatment.      Post Infusion Assessment:  Patient tolerated injection without incident.  Blood return noted pre and post infusion.  Site patent and intact, free from redness, edema or discomfort.  No evidence of extravasations.  Access discontinued per protocol.    Discharge Plan:   Discharge instructions reviewed with: Patient.  Patient and/or family verbalized understanding of discharge instructions and all questions answered.  Copy of AVS reviewed with patient and/or family.  Patient will return 7/25 for next appointment.  Patient discharged in stable condition accompanied by: self.  Departure Mode: Ambulatory.    Rosangela Reese RN                    
Home

## 2023-12-09 NOTE — PROGRESS NOTES
[facility]  Hospitalist Progress Note  Addy Osorio MD  03/27/2019    Assessment & Plan   Amira Arreola is a 86 year old female admitted on 3/22/2019.  Past history of Multiple Myeloma metastatic to bone, chronic atrial fibrillation on Warfarin, Hypertension, multiple prior Orthopedics surgeries, and Shingles who presents with progressive dyspnea, leg swelling, and intermittent palpitations and is found to have afib with RVR and likely acute diastolic CHF exacerbation.         Afib with RVR and likely acute diastolic CHF exacerbation  Follows with Dr. Rivera of [facility].  Presents with progressive SALAZAR, leg edema.  Admission EKG showing A-fib with RVR, serial troponin wnl.  Pro-BNP elevated at 4828 with CXR showing bilateral moderate pleural effusions with associated infiltrate vs atelectasis (afebrile, no leukocytosis to suggest PNA).  TTE 3/23 with EF 55-60% without WMA, 2+ MR, 2+TR, severe pulmonary hypertension and dilated IVC.  No other clear cause to trigger RVR: TSH wnl,  low suspicion for PE given therapeutic INR, no infectious symptoms, possibly due to progressive CHF.  Her CHADS-2 Vasc score is listed at 4.   - continue lasix 40 mg IV bid; weights improving (I/O's inaccurate due to incontinence) and marked improvement in LE edema 3/26, weight 135, goal 130-132  - rates currently controlled without dilt gtt  - continue metoprolol 150 mg bid (increased 3/24, defer titration to Cards)  - continue coumadin, PharmD to dose  - lymphedema wraps during the day  - was on and off diltiazem gtt overnight with soft blood pressures  - now HR is ok, will monitor for 24 hours before discharge to [facility]     Multiple Myeloma  Diagnosed ion 2016 by bone marrow biopsy and treated by [facility] Dr. Roberts. Currently she is on Daratumab, Velcade, and Dexamethasone every 2 weeks.  - oncology consulted, chemo will be held until discharged from [facility]  - Continue Zometa as an outpatient     Chronic  "anemia  - hgb stable 10-11 g/dL     Chronic pain due to thoracic compression fracture  - Continue PTA Oxycodone 15 mg q8h prn     Glaucoma  - Continue eye drops     Shingles  - Continue BID Acyclovir     Diet: Combination Diet Low Saturated Fat Na <2400mg Diet, No Caffeine Diet  Room Service    DVT Prophylaxis: Warfarin  Russo Catheter: not present  Code Status: Full Code       Disposition Plan  Expected discharge on 3/28      Interval History   -- chart reviewed  -- cardiology notes reviewed  -- incontinent of urine, so not well recorded  -- noted soft blood pressures overnight  -- was on/off diltiazem overnight    -Data reviewed today: I reviewed all new labs and imaging over the last 24 hours. I personally reviewed no images or EKG's today.    Physical Exam   Heart Rate: 89, Blood pressure 111/87, pulse 75, temperature 98.6  F (37  C), temperature source Oral, resp. rate 16, height 1.651 m (5' 5\"), weight 61.4 kg (135 lb 4.8 oz), SpO2 98 %, not currently breastfeeding.  Vitals:    03/25/19 0530 03/26/19 0630 03/27/19 0545   Weight: 66 kg (145 lb 9.6 oz) 60.2 kg (132 lb 12.8 oz) 61.4 kg (135 lb 4.8 oz)     Vital Signs with Ranges  Temp:  [97.3  F (36.3  C)-100.3  F (37.9  C)] 98.6  F (37  C)  Pulse:  [] 75  Heart Rate:  [] 89  Resp:  [16-18] 16  BP: ()/(46-89) 111/87  SpO2:  [91 %-98 %] 98 %  I/O's Last 24 hours  I/O last 3 completed shifts:  In: 941 [P.O.:935; I.V.:6]  Out: -     Constitutional: Awake, alert, cooperative, no apparent distress  Respiratory: diminshed bilaterally, no crackles or wheezing  Cardiovascular: irregular,   GI: Normal bowel sounds, soft, non-distended, non-tender  Skin/Integumen: No rashes, no cyanosis, ++ edema with ace dressings  Other:      Medications   All medications were reviewed.    diltiazem (CARDIZEM) infusion ADULT Stopped (03/27/19 0530)     Reason anticoagulant not prescribed for atrial fibrillation       Warfarin Therapy Reminder         acyclovir  400 mg " Oral BID     brinzolamide-brimonidine  1 drop Right Eye BID     furosemide  40 mg Oral BID     latanoprost  1 drop Right Eye Daily     metoprolol succinate ER  150 mg Oral BID     sodium chloride (PF)  3 mL Intracatheter Q8H     warfarin  2 mg Oral ONCE at 18:00        Data   Recent Labs   Lab 03/27/19  0623 03/26/19  0538 03/25/19  1510 03/25/19  0603 03/24/19  0533 03/23/19  0708 03/23/19  0406 03/22/19  2353 03/22/19  2044   WBC  --   --   --   --  9.5 6.6  --   --  8.1   HGB  --   --   --   --  10.5* 10.6*  --   --  11.4*   MCV  --   --   --   --  94 97  --   --  98   PLT  --   --   --   --  151 138*  --   --  175   INR 2.55* 2.13*  --  2.06* 1.88* 2.19*  --   --  2.16*   NA  --  139  --  140 142 145*  --   --  144   POTASSIUM  --  3.6 4.0 3.2* 3.5 3.5  --   --  3.9   CHLORIDE  --  105  --  106 108 111*  --   --  110*   CO2  --  29  --  28 28 26  --   --  28   BUN  --  15  --  13 17 15  --   --  18   CR  --  0.66  --  0.69 0.75 0.74  --   --  0.76   ANIONGAP  --  5  --  6 6 8  --   --  6   BRADLEY  --  9.5  --  8.9 8.9 8.8  --   --  9.1   GLC  --  108*  --  102* 117* 113*  --   --  108*   TROPI  --   --   --   --   --  <0.015 0.016 0.016  --        No results found for this or any previous visit (from the past 24 hour(s)).    Addy Osorio MD  Text Page  (7am to 6pm)        Change in provider or treatment (i.e., medications, psychotherapy, milieu)

## 2025-05-20 NOTE — PROGRESS NOTES
"[facility]  [place] [facility] Record Number:  [id number]  Place of Service where encounter took place: [facility] [address] ([facility]) [[id number]]    HPI:    Amira Arreola is a 86 year old  (7/17/1932), who is being seen today for an episodic care visit at the above location.   HPI information obtained from: facility chart records, facility staff, patient report and [facility] chart review. Today's concern is INR/Coumadin management for A. Fib    ROS/Subjective:  Bleeding Signs/Symptoms:  None  Thromboembolic Signs/Symptoms:  None  Medication Changes:  Yes  Dietary Changes:  No  Activity Changes: Yes  Bacterial/Viral Infection:  No  Missed Coumadin Doses:  None  On ASA: No  Other Concerns:  No    OBJECTIVE:  /67   Pulse 136   Temp 97.1  F (36.2  C)   Resp 18   Ht 1.651 m (5' 5\")   Wt 56.9 kg (125 lb 6.4 oz)   SpO2 96%   BMI 20.87 kg/m    Last INR: 2.3 on 4/8  INR Today:  1.7  Current Dose:  4mgg T Sat and 2mg ROW  INR Flow sheet at SNF:    ASSESSMENT:     Encounter for therapeutic drug monitoring  Long term (current) use of anticoagulants  Paroxysmal atrial fibrillation (H)  Slightly Subtherapeutic INR for goal of 2-3- she has been quite stable on current dose so will continue    PLAN:   Orders written by provider at facility  New Dose: No Change    Next INR: 1 week      Electronically signed by:  DAYSI Marquez CNP       "
full weight-bearing
no

## (undated) DEVICE — INTRO SFSHEATH WORLEY 40CM 9FR S CSGWORLEY109M

## (undated) DEVICE — CABLE PACING ALLIGATOR CLIP 12FT 5833SL

## (undated) DEVICE — PACK PCMKR PERM SRG PROC LF SAN32PC573

## (undated) DEVICE — CATH EP 110CM 7FR BLZR2 HTD 2

## (undated) DEVICE — SHEATH PRELUDE SNAP 7FRX13CM PLS-1007

## (undated) DEVICE — DEFIB PRO-PADZ LVP LQD GEL ADULT 8900-2105-01

## (undated) RX ORDER — FENTANYL CITRATE 50 UG/ML
INJECTION, SOLUTION INTRAMUSCULAR; INTRAVENOUS
Status: DISPENSED
Start: 2019-07-05

## (undated) RX ORDER — LIDOCAINE HYDROCHLORIDE 10 MG/ML
INJECTION, SOLUTION EPIDURAL; INFILTRATION; INTRACAUDAL; PERINEURAL
Status: DISPENSED
Start: 2019-07-05

## (undated) RX ORDER — BUPIVACAINE HYDROCHLORIDE 2.5 MG/ML
INJECTION, SOLUTION EPIDURAL; INFILTRATION; INTRACAUDAL
Status: DISPENSED
Start: 2019-07-05

## (undated) RX ORDER — CLINDAMYCIN PHOSPHATE 900 MG/50ML
INJECTION, SOLUTION INTRAVENOUS
Status: DISPENSED
Start: 2017-05-30